# Patient Record
Sex: FEMALE | Race: BLACK OR AFRICAN AMERICAN | NOT HISPANIC OR LATINO | Employment: OTHER | URBAN - METROPOLITAN AREA
[De-identification: names, ages, dates, MRNs, and addresses within clinical notes are randomized per-mention and may not be internally consistent; named-entity substitution may affect disease eponyms.]

---

## 2017-01-05 ENCOUNTER — TELEPHONE (OUTPATIENT)
Dept: INTERNAL MEDICINE | Facility: CLINIC | Age: 67
End: 2017-01-05

## 2017-01-05 ENCOUNTER — TELEPHONE (OUTPATIENT)
Dept: ENDOCRINOLOGY | Facility: CLINIC | Age: 67
End: 2017-01-05

## 2017-01-05 NOTE — TELEPHONE ENCOUNTER
----- Message from Jessi Paulino sent at 1/4/2017  4:13 PM CST -----  Contact: Marjorie/ Mery Castelan / [phone]  pls submit within 6 months prior to dispensing medical necessity and testing frequently     pls fax information to Marjorie @ [phone]    Marjorie can be reached at [phone].

## 2017-01-05 NOTE — TELEPHONE ENCOUNTER
----- Message from Kellie Pavon sent at 1/5/2017  2:04 PM CST -----  Contact: self/[phone]  Pt called in regards to having a  sinus infection/ earache. She did not want the first available urgent care. She wants the dr to call her something into the pharmacy.      Please advise

## 2017-01-05 NOTE — TELEPHONE ENCOUNTER
Called  to the pharmacy and verified what they need and will fax back clinical notes for pt insurance.

## 2017-01-06 ENCOUNTER — TELEPHONE (OUTPATIENT)
Dept: PHARMACY | Facility: CLINIC | Age: 67
End: 2017-01-06

## 2017-01-06 ENCOUNTER — OFFICE VISIT (OUTPATIENT)
Dept: INTERNAL MEDICINE | Facility: CLINIC | Age: 67
End: 2017-01-06
Payer: MEDICARE

## 2017-01-06 VITALS — HEART RATE: 64 BPM | SYSTOLIC BLOOD PRESSURE: 118 MMHG | DIASTOLIC BLOOD PRESSURE: 60 MMHG

## 2017-01-06 DIAGNOSIS — G47.33 OBSTRUCTIVE SLEEP APNEA: Chronic | ICD-10-CM

## 2017-01-06 DIAGNOSIS — E11.42 TYPE 2 DIABETES MELLITUS WITH DIABETIC POLYNEUROPATHY, WITH LONG-TERM CURRENT USE OF INSULIN: Primary | ICD-10-CM

## 2017-01-06 DIAGNOSIS — E78.5 DYSLIPIDEMIA: Chronic | ICD-10-CM

## 2017-01-06 DIAGNOSIS — I63.9 CEREBROVASCULAR ACCIDENT (CVA), UNSPECIFIED MECHANISM: Chronic | ICD-10-CM

## 2017-01-06 DIAGNOSIS — Z79.4 TYPE 2 DIABETES MELLITUS WITH DIABETIC POLYNEUROPATHY, WITH LONG-TERM CURRENT USE OF INSULIN: Primary | ICD-10-CM

## 2017-01-06 PROCEDURE — 99999 PR PBB SHADOW E&M-EST. PATIENT-LVL II: CPT | Mod: PBBFAC,,, | Performed by: FAMILY MEDICINE

## 2017-01-06 PROCEDURE — 99212 OFFICE O/P EST SF 10 MIN: CPT | Mod: PBBFAC | Performed by: FAMILY MEDICINE

## 2017-01-06 PROCEDURE — 99215 OFFICE O/P EST HI 40 MIN: CPT | Mod: S$PBB,,, | Performed by: FAMILY MEDICINE

## 2017-01-06 PROCEDURE — 90732 PPSV23 VACC 2 YRS+ SUBQ/IM: CPT | Mod: PBBFAC | Performed by: FAMILY MEDICINE

## 2017-01-06 RX ORDER — NYSTATIN 100000 [USP'U]/ML
6 SUSPENSION ORAL 4 TIMES DAILY
Qty: 240 ML | Refills: 1 | Status: SHIPPED | OUTPATIENT
Start: 2017-01-06 | End: 2017-01-16

## 2017-01-06 RX ORDER — AZITHROMYCIN 250 MG/1
TABLET, FILM COATED ORAL
Qty: 6 TABLET | Refills: 0 | Status: SHIPPED | OUTPATIENT
Start: 2017-01-06 | End: 2017-01-27

## 2017-01-06 NOTE — PROGRESS NOTES
Subjective:       Patient ID: Annika Mac is a 66 y.o. female.    Chief Complaint: No chief complaint on file.  Annika Mac 66 y.o. female is here for office visit to review care and physical exam, here for f/u and has fa++ilry new c/o cough and sore throat.  Otherwise feels baseline.  Note frustrated with diabetes care, CBgs taken fasting TID are low 200s, using U500 16,11,6 respectivly.      HPI  Review of Systems   Constitutional: Negative for activity change, fatigue, fever and unexpected weight change.   HENT: Positive for sore throat. Negative for congestion, hearing loss, postnasal drip and rhinorrhea.    Eyes: Negative for redness and visual disturbance.   Respiratory: Positive for cough. Negative for chest tightness, shortness of breath and wheezing.    Cardiovascular: Negative for chest pain, palpitations and leg swelling.   Gastrointestinal: Negative for abdominal distention.   Genitourinary: Negative for decreased urine volume, dysuria, flank pain, hematuria, pelvic pain and urgency.   Musculoskeletal: Negative for back pain, gait problem, joint swelling and neck stiffness.   Skin: Negative for color change, rash and wound.   Neurological: Negative for dizziness, syncope, weakness and headaches.   Psychiatric/Behavioral: Negative for behavioral problems, confusion and sleep disturbance. The patient is not nervous/anxious.        Objective:      Physical Exam   Constitutional: She is oriented to person, place, and time. She appears well-developed and well-nourished. No distress.   HENT:   Head: Normocephalic.   Mouth/Throat: No oropharyngeal exudate.   White coating on tounge   Eyes: EOM are normal. Pupils are equal, round, and reactive to light. No scleral icterus.   Neck: Neck supple. No JVD present. No thyromegaly present.   Cardiovascular: Normal rate, regular rhythm and normal heart sounds.  Exam reveals no gallop and no friction rub.    No murmur heard.  Pulmonary/Chest: Effort normal and breath  sounds normal. She has no wheezes. She has no rales.   Abdominal: Soft. Bowel sounds are normal. She exhibits no distension and no mass. There is no tenderness. There is no guarding.   Musculoskeletal: Normal range of motion. She exhibits no edema.   Lymphadenopathy:     She has no cervical adenopathy.   Neurological: She is alert and oriented to person, place, and time. She has normal reflexes. She displays normal reflexes. No cranial nerve deficit. She exhibits normal muscle tone.   Skin: Skin is warm. No rash noted. No erythema.   Psychiatric: She has a normal mood and affect. Thought content normal.       Assessment:       No diagnosis found.    Plan:       Diagnoses and all orders for this visit:    Type 2 diabetes mellitus with diabetic polyneuropathy, with long-term current use of insulin  - discussed titrating U500 to initial goal of fasting CBGs < 150, final goal < 130 asked pharm if can have help )PAP) for transition to Toujeo and Byeta as posible alt to present regiment  Cerebrovascular accident (CVA), unspecified mechanism  - control risk, chart reviewed  Obstructive sleep apnea  - Advisedd to use CPAP  Dyslipidemia  - reviewed

## 2017-01-06 NOTE — MR AVS SNAPSHOT
[facility] - Internal Medicine  [address]  [facility] [address]  Phone: [phone]  Fax: [phone]                  Annika Mac   2017 9:20 AM   Office Visit    Description:  Female : 1950   Provider:  Cody Villatoro MD   Department:  [facility] - Internal Medicine           Diagnoses this Visit        Comments    Type 2 diabetes mellitus with diabetic polyneuropathy, with long-term current use of insulin    -  Primary     Cerebrovascular accident (CVA), unspecified mechanism         Obstructive sleep apnea         Dyslipidemia                To Do List           Future Appointments        Provider Department Dept Phone    2017 10:30 AM Larry Mcguire MD [facility] - Cardiology [phone]    2017 10:00 AM Aniyah Jama DPM [facility] - Podiatry [phone]    2017 1:30 PM Micaela Barrientos MD [facility]-Physical Med & Rehab [phone]    [facility]2017 9:00 [facility] [phone]    3/28/2017 9:00 AM LOUISA Castro [facility] Endocrinology [phone]      Goals (5 Years of Data)              16    HEMOGLOBIN A1C < 7.5   9.1  10.8  8.6      Follow-Up and Disposition     Return in about 4 weeks (around 2/3/2017), or if symptoms worsen or fail to improve.       These Medications        Disp Refills Start End    nystatin (MYCOSTATIN) 100,000 unit/mL suspension 240 mL 1 2017    Take 6 mLs (600,000 Units total) by mouth 4 (four) times daily. - Oral    Pharmacy: [facility] [address] [place] [address] AT [facility] Ph #: [phone]       azithromycin (ZITHROMAX Z-MILAN) 250 MG tablet 6 tablet 0 2017     Use two first day then one a day    Pharmacy: [facility] [address] [place] [address] [address] AT [facility]  [address] Ph #: [phone]         [facility]     [facility] -   "Assistance  Registered nurses in the [facility] provide clinical advisement, health education, appointment booking, and other advisory services.  Call for this free service at [phone].             Medications           Message regarding Medications     Verify the changes and/or additions to your medication regime listed below are the same as discussed with your clinician today.  If any of these changes or additions are incorrect, please notify your healthcare provider.        START taking these NEW medications        Refills    nystatin (MYCOSTATIN) 100,000 unit/mL suspension 1    Sig: Take 6 mLs (600,000 Units total) by mouth 4 (four) times daily.    Class: Normal    Route: Oral    azithromycin (ZITHROMAX Z-MILAN) 250 MG tablet 0    Sig: Use two first day then one a day    Class: Normal           Verify that the below list of medications is an accurate representation of the medications you are currently taking.  If none reported, the list may be blank. If incorrect, please contact your healthcare provider. Carry this list with you in case of emergency.           Current Medications     albuterol 90 mcg/actuation inhaler Inhale 2 puffs into the lungs every 6 (six) hours as needed for Wheezing.    allopurinol (ZYLOPRIM) 100 MG tablet Take 1 tablet by mouth once daily    aspirin 81 MG Chew Take 1 tablet (81 mg total) by mouth once daily.    azelastine (ASTELIN) 137 mcg (0.1 %) nasal spray 1 spray (137 mcg total) by Nasal route 2 (two) times daily.    azithromycin (ZITHROMAX Z-MILAN) 250 MG tablet Use two first day then one a day    BD INSULIN SYRINGE ULTRA-FINE 0.3 mL 30 gauge x 1/2" Syrg USE THREE TIMES DAILY FOR INSULIN INJECTIONS    blood-glucose meter kit Use as instructed, true test/result meter    colchicine 0.6 mg tablet Take 1 tablet (0.6 mg total) by mouth once daily.    duloxetine (CYMBALTA) 30 MG capsule Take 1 capsule (30 mg total) by mouth once daily.    fexofenadine (ALLEGRA) 180 MG tablet " "Take 1 tablet (180 mg total) by mouth once daily.    fluticasone (FLONASE) 50 mcg/actuation nasal spray 2 sprays by Each Nare route once daily.    furosemide (LASIX) 20 MG tablet Take 1 tablet by mouth once a day    hydrocodone-acetaminophen 7.5-325mg (NORCO) 7.5-325 mg per tablet Take 1 tablet by mouth every 6 (six) hours as needed for Pain.    hydrocodone-acetaminophen 7.5-325mg (NORCO) 7.5-325 mg per tablet Starting on Jan 11, 2017. Take 1 tablet by mouth every 6 (six) hours as needed for Pain.    INSULIN REGULAR, HUMAN (INSULIN REGULAR HUM U-500 CONC SUBQ) Inject into the skin. 17 unit trina with meals     irbesartan (AVAPRO) 300 MG tablet Take 1 tablet (300 mg total) by mouth every evening.    labetalol (NORMODYNE) 300 MG tablet Take 1 tablet by mouth two  times daily    lancets 31 gauge Misc 1 lancet by Misc.(Non-Drug; Combo Route) route 4 (four) times daily.    levocetirizine (XYZAL) 5 MG tablet Take 1 tablet (5 mg total) by mouth every evening.    montelukast (SINGULAIR) 10 mg tablet TAKE 1 TABLET(10 MG) BY MOUTH EVERY EVENING    nystatin (MYCOSTATIN) 100,000 unit/mL suspension Take 6 mLs (600,000 Units total) by mouth 4 (four) times daily.    omeprazole (PRILOSEC) 20 MG capsule Take 1 capsule (20 mg total) by mouth 2 (two) times daily.    pen needle, diabetic 32 gauge x 1/5" Ndle 1 application by Misc.(Non-Drug; Combo Route) route 2 (two) times daily.    pen needle, diabetic 32 gauge x 1/6" Ndle 1 application by Misc.(Non-Drug; Combo Route) route 2 (two) times daily.    pregabalin (LYRICA) 100 MG capsule Take 1 capsule (100 mg total) by mouth 2 (two) times daily.    simvastatin (ZOCOR) 40 MG tablet Take 1 tablet by mouth  every evening    spironolactone (ALDACTONE) 25 MG tablet Take 1 tablet by mouth once a day    TRUETEST TEST STRIPS Strp 1 strip by Misc.(Non-Drug; Combo Route) route 4 (four) times daily.           Clinical Reference Information           Vital Signs - Last Recorded  Most recent update: " 1/6/2017  9:40 AM by Cody Villatoro MD    BP Pulse                118/60 64          Blood Pressure          Most Recent Value    BP  118/60      Allergies as of 1/6/2017     Iodinated Contrast Media - Iv Dye      Immunizations Administered on Date of Encounter - 1/6/2017     None

## 2017-01-13 ENCOUNTER — HOSPITAL ENCOUNTER (EMERGENCY)
Facility: HOSPITAL | Age: 67
Discharge: HOME | End: 2017-01-13
Attending: EMERGENCY MEDICINE
Payer: MEDICARE

## 2017-01-13 VITALS
HEART RATE: 77 BPM | WEIGHT: 293 LBS | RESPIRATION RATE: 18 BRPM | SYSTOLIC BLOOD PRESSURE: 131 MMHG | BODY MASS INDEX: 48.82 KG/M2 | HEIGHT: 65 IN | TEMPERATURE: 99 F | OXYGEN SATURATION: 94 % | DIASTOLIC BLOOD PRESSURE: 62 MMHG

## 2017-01-13 DIAGNOSIS — M25.475 SWELLING OF FIRST METATARSOPHALANGEAL (MTP) JOINT OF LEFT FOOT: Primary | ICD-10-CM

## 2017-01-13 DIAGNOSIS — M79.672 FOOT PAIN, LEFT: ICD-10-CM

## 2017-01-13 LAB
ANION GAP SERPL CALC-SCNC: 8 MMOL/L
BASOPHILS # BLD AUTO: 0.02 K/UL
BASOPHILS NFR BLD: 0.2 %
BUN SERPL-MCNC: 31 MG/DL
CALCIUM SERPL-MCNC: 10.5 MG/DL
CHLORIDE SERPL-SCNC: 102 MMOL/L
CO2 SERPL-SCNC: 28 MMOL/L
CREAT SERPL-MCNC: 1.7 MG/DL
DIFFERENTIAL METHOD: ABNORMAL
EOSINOPHIL # BLD AUTO: 0.2 K/UL
EOSINOPHIL NFR BLD: 1.4 %
ERYTHROCYTE [DISTWIDTH] IN BLOOD BY AUTOMATED COUNT: 13.6 %
EST. GFR  (AFRICAN AMERICAN): 35.5 ML/MIN/1.73 M^2
EST. GFR  (NON AFRICAN AMERICAN): 30.8 ML/MIN/1.73 M^2
GLUCOSE SERPL-MCNC: 105 MG/DL
HCT VFR BLD AUTO: 31.3 %
HGB BLD-MCNC: 10.2 G/DL
LYMPHOCYTES # BLD AUTO: 1.3 K/UL
LYMPHOCYTES NFR BLD: 11.5 %
MCH RBC QN AUTO: 28.2 PG
MCHC RBC AUTO-ENTMCNC: 32.6 %
MCV RBC AUTO: 87 FL
MONOCYTES # BLD AUTO: 1.1 K/UL
MONOCYTES NFR BLD: 9.6 %
NEUTROPHILS # BLD AUTO: 8.6 K/UL
NEUTROPHILS NFR BLD: 76.8 %
PLATELET # BLD AUTO: 334 K/UL
PMV BLD AUTO: 10.1 FL
POTASSIUM SERPL-SCNC: 4.3 MMOL/L
RBC # BLD AUTO: 3.62 M/UL
SODIUM SERPL-SCNC: 138 MMOL/L
URATE SERPL-MCNC: 7.1 MG/DL
WBC # BLD AUTO: 11.25 K/UL

## 2017-01-13 PROCEDURE — 99284 EMERGENCY DEPT VISIT MOD MDM: CPT | Mod: ,,, | Performed by: EMERGENCY MEDICINE

## 2017-01-13 PROCEDURE — 25000003 PHARM REV CODE 250: Performed by: PHYSICIAN ASSISTANT

## 2017-01-13 PROCEDURE — 99284 EMERGENCY DEPT VISIT MOD MDM: CPT

## 2017-01-13 PROCEDURE — 80048 BASIC METABOLIC PNL TOTAL CA: CPT

## 2017-01-13 PROCEDURE — 85025 COMPLETE CBC W/AUTO DIFF WBC: CPT

## 2017-01-13 PROCEDURE — 84550 ASSAY OF BLOOD/URIC ACID: CPT

## 2017-01-13 RX ORDER — HYDROCODONE BITARTRATE AND ACETAMINOPHEN 5; 325 MG/1; MG/1
1 TABLET ORAL
Status: COMPLETED | OUTPATIENT
Start: 2017-01-13 | End: 2017-01-13

## 2017-01-13 RX ORDER — TRAMADOL HYDROCHLORIDE 50 MG/1
50 TABLET ORAL EVERY 6 HOURS PRN
Qty: 15 TABLET | Refills: 0 | Status: SHIPPED | OUTPATIENT
Start: 2017-01-13 | End: 2017-01-13

## 2017-01-13 RX ORDER — HYDROCODONE BITARTRATE AND ACETAMINOPHEN 5; 325 MG/1; MG/1
1 TABLET ORAL EVERY 6 HOURS PRN
Qty: 15 TABLET | Refills: 0 | Status: SHIPPED | OUTPATIENT
Start: 2017-01-13 | End: 2017-01-23

## 2017-01-13 RX ORDER — CEPHALEXIN 500 MG/1
500 CAPSULE ORAL
Status: COMPLETED | OUTPATIENT
Start: 2017-01-13 | End: 2017-01-13

## 2017-01-13 RX ORDER — CEPHALEXIN 500 MG/1
500 CAPSULE ORAL EVERY 12 HOURS
Qty: 14 CAPSULE | Refills: 0 | Status: SHIPPED | OUTPATIENT
Start: 2017-01-13 | End: 2017-01-20

## 2017-01-13 RX ADMIN — CEPHALEXIN 500 MG: 500 CAPSULE ORAL at 04:01

## 2017-01-13 RX ADMIN — HYDROCODONE BITARTRATE AND ACETAMINOPHEN 1 TABLET: 5; 325 TABLET ORAL at 02:01

## 2017-01-13 NOTE — ED AVS SNAPSHOT
[facility]  [address]  [facility] [address]               Annika Mac   2017  2:11 PM   ED    Description:  Female : 1950   Department:  [facility]           Your Care was Coordinated By:     Provider Role From To    Vipin Hawkins MD Attending Provider 17 1416 --    PENG Chaparro Physician Assistant 17 141 --      Reason for Visit     Foot Pain           Diagnoses this Visit        Comments    Swelling of first metatarsophalangeal (MTP) joint of left foot    -  Primary     Foot pain, left           ED Disposition     None           To Do List           Follow-up Information     Follow up with Cody Villatoro MD. Call in 1 day.    Specialty:  Family Medicine    Why:  To discuss ER visit and schedule follow up appointment within 1 week    Contact information:    [address]  [facility] [address]  [phone]         These Medications        Disp Refills Start End    hydrocodone-acetaminophen 5-325mg (NORCO) 5-325 mg per tablet 15 tablet 0 2017    Take 1 tablet by mouth every 6 (six) hours as needed for Pain. - Oral    Pharmacy: [facility] [address] [place] [address] [address] [facility]  #: [phone]         [facility]     [facility] -  Assistance  Registered nurses in the [facility] provide clinical advisement, health education, appointment booking, and other advisory services.  Call for this free service at [phone].             Medications           Message regarding Medications     Verify the changes and/or additions to your medication regime listed below are the same as discussed with your clinician today.  If any of these changes or additions are incorrect, please notify your healthcare provider.        START taking these NEW medications        Refills    hydrocodone-acetaminophen  "5-325mg (NORCO) 5-325 mg per tablet 0    Sig: Take 1 tablet by mouth every 6 (six) hours as needed for Pain.    Class: Print    Route: Oral      These medications were administered today        Dose Freq    hydrocodone-acetaminophen 5-325mg per tablet 1 tablet 1 tablet ED 1 Time    Sig: Take 1 tablet by mouth ED 1 Time.    Class: Normal    Route: Oral    Cosign for Ordering: Accepted by Vipin Hawkins MD on 1/13/2017  3:06 PM      STOP taking these medications     hydrocodone-acetaminophen 7.5-325mg (NORCO) 7.5-325 mg per tablet Take 1 tablet by mouth every 6 (six) hours as needed for Pain.           Verify that the below list of medications is an accurate representation of the medications you are currently taking.  If none reported, the list may be blank. If incorrect, please contact your healthcare provider. Carry this list with you in case of emergency.           Current Medications     albuterol 90 mcg/actuation inhaler Inhale 2 puffs into the lungs every 6 (six) hours as needed for Wheezing.    allopurinol (ZYLOPRIM) 100 MG tablet Take 1 tablet by mouth once daily    aspirin 81 MG Chew Take 1 tablet (81 mg total) by mouth once daily.    azelastine (ASTELIN) 137 mcg (0.1 %) nasal spray 1 spray (137 mcg total) by Nasal route 2 (two) times daily.    azithromycin (ZITHROMAX Z-MILAN) 250 MG tablet Use two first day then one a day    BD INSULIN SYRINGE ULTRA-FINE 0.3 mL 30 gauge x 1/2" Syrg USE THREE TIMES DAILY FOR INSULIN INJECTIONS    blood-glucose meter kit Use as instructed, true test/result meter    colchicine 0.6 mg tablet Take 1 tablet (0.6 mg total) by mouth once daily.    duloxetine (CYMBALTA) 30 MG capsule Take 1 capsule (30 mg total) by mouth once daily.    fexofenadine (ALLEGRA) 180 MG tablet Take 1 tablet (180 mg total) by mouth once daily.    fluticasone (FLONASE) 50 mcg/actuation nasal spray 2 sprays by Each Nare route once daily.    furosemide (LASIX) 20 MG tablet Take 1 tablet by mouth once a " "day    hydrocodone-acetaminophen 5-325mg (NORCO) 5-325 mg per tablet Take 1 tablet by mouth every 6 (six) hours as needed for Pain.    INSULIN REGULAR, HUMAN (INSULIN REGULAR HUM U-500 CONC SUBQ) Inject into the skin. 17 unit trina with meals     irbesartan (AVAPRO) 300 MG tablet Take 1 tablet (300 mg total) by mouth every evening.    labetalol (NORMODYNE) 300 MG tablet Take 1 tablet by mouth two  times daily    lancets 31 gauge Misc 1 lancet by Misc.(Non-Drug; Combo Route) route 4 (four) times daily.    levocetirizine (XYZAL) 5 MG tablet Take 1 tablet (5 mg total) by mouth every evening.    montelukast (SINGULAIR) 10 mg tablet TAKE 1 TABLET(10 MG) BY MOUTH EVERY EVENING    nystatin (MYCOSTATIN) 100,000 unit/mL suspension Take 6 mLs (600,000 Units total) by mouth 4 (four) times daily.    omeprazole (PRILOSEC) 20 MG capsule Take 1 capsule (20 mg total) by mouth 2 (two) times daily.    pen needle, diabetic 32 gauge x 1/5" Ndle 1 application by Misc.(Non-Drug; Combo Route) route 2 (two) times daily.    pen needle, diabetic 32 gauge x 1/6" Ndle 1 application by Misc.(Non-Drug; Combo Route) route 2 (two) times daily.    pregabalin (LYRICA) 100 MG capsule Take 1 capsule (100 mg total) by mouth 2 (two) times daily.    simvastatin (ZOCOR) 40 MG tablet Take 1 tablet by mouth  every evening    spironolactone (ALDACTONE) 25 MG tablet Take 1 tablet by mouth once a day    TRUETEST TEST STRIPS Strp 1 strip by Misc.(Non-Drug; Combo Route) route 4 (four) times daily.           Clinical Reference Information           Your Vitals Were     BP Pulse Temp Resp Height Weight    119/58 (BP Location: Right arm, Patient Position: Sitting) 88 98.9 °F (37.2 °C) (Oral) 16 5' 5" (1.651 m) 138.8 kg (306 lb)    SpO2 BMI             95% 50.92 kg/m2         Allergies as of 1/13/2017        Reactions    Iodinated Contrast Media - Iv Dye Rash      Immunizations Administered on Date of Encounter - 1/13/2017     None      ED Micro, Lab, POCT     Start " Ordered       Status Ordering Provider    01/13/17 1437 01/13/17 1436  Uric acid  STAT      Final result     01/13/17 1436 01/13/17 1436  CBC auto differential  STAT      Final result     01/13/17 1436 01/13/17 1436  Basic metabolic panel  STAT      Final result       ED Imaging Orders     Start Ordered       Status Ordering Provider    01/13/17 1436 01/13/17 1436  X-Ray Foot Complete Left  1 time imaging      Final result         Discharge Instructions             Cellulitis  Cellulitis is an infection of the deep layers of skin. A break in the skin, such as a cut or scratch, can let bacteria under the skin. If the bacteria get to deep layers of the skin, it can be serious. If not treated, cellulitis can get into the bloodstream and lymph nodes. The infection can then spread throughout the body. This causes serious illness.  Cellulitis causes the affected skin to become red, swollen, warm, and sore. The reddened areas have a visible border. An open sore may leak fluid (pus). You may have a fever, chills, and pain.  Cellulitis is treated with antibiotics taken for 7 to 10 days. An open sore may be cleaned and covered with cool wet gauze. Symptoms should get better 1 to 2 days after treatment is started. Make sure to take all the antibiotics for the full number of days until they are gone. Keep taking the medicine even if your symptoms go away.  Home care  Follow these tips:  · Limit the use of the part of your body with cellulitis. Movement can cause the infection to spread.  · If the infection is on your leg, walk as little as possible in the first few days of the treatment. Keep your leg raised while sitting. This will help to reduce swelling.  · Take all of the antibiotic medicine exactly as directed until it is gone. Do not miss any doses, especially during the first 7 days. Dont stop taking the medicine when your symptoms get better.  · Keep the affected area clean and dry.  · Wash your hands with soap and  warm water before and after touching your skin. Anyone else who touches your skin should also wash his or her hands. Don't share towels.  Follow-up care  Follow up with your healthcare provider. If your infection does not go away on 1 antibiotic, your healthcare provider will prescribe a different one.  When to seek medical advice  Call your healthcare provider right away if any of these occur:  · Red areas that spread  · Swelling or pain that gets worse  · Fluid leaking from the skin (pus)  · Fever higher of 100.4º F (38.0º C) or higher after 2 days on antibiotics  © 0361-8419 [employer]. [address] [place] [address], [place] [address]. All rights reserved. This information is not intended as a substitute for professional medical care. Always follow your healthcare professional's instructions.          Gout  \  Gout or is an inflammation of a joint due to a build-up of gout crystals in the joint fluid. This occurs when there is an excess of uric acid (a normal waste product) in the body. Uric acid builds up in the body when the kidneys are unable to filter enough of it from the blood. This may occur with age. It is also associated with kidney disease. Gout occurs more often in persons with obesity, diabetes, hypertension, or high levels of fats in the blood. It may be run in families. Gout tends to come and go. A flare up of gout is called an attack. Drinking alcohol or eating certain foods (such as shellfish or foods with additives such as high-fructose corn syrup) may increase uric acid levels in the blood and cause a gout attack.  During a gout attack, the affected joint may become a hot, red, swollen and painful. If you have had one attack of gout, you are likely to have another. An attack of gout can be treated with medicine. If these attacks become frequent, a daily medicine may be prescribed to help the kidneys remove uric acid from the body.  Home care  During a gout attack:  · Rest painful joints.  If gout affects the joints of your foot or leg, you may want to use crutches for the first few days to keep from bearing weight on the affected joint.  · When sitting or lying down, raise the painful joint to a level higher than your heart.  · Apply an ice pack (ice cubes in a plastic bag wrapped in a thin towel) over the injured area for 20 minutes every 1-2 hours the first day for pain relief. Continue this 3-4 times a day for swelling and pain.  · Avoid alcohol and foods listed below (see Preventing attacks) during a gout attack. Drink extra fluid to help flush the uric acid through your kidneys.  · If you were prescribed a medication to treat gout, take it as your healthcare provider has instructed. Don't skip doses.  · Take anti-inflammatory medicine as directed.   · If pain medicines have been prescribed, take them exactly as directed.    Preventing attacks  · Minimize or avoid alcohol use. Excess alcohol intake can cause a gout attack.  · Limit these foods and beverages:  ¨ Organ meats, such as kidneys and liver  ¨ Certain seafoods (anchovies, sardines, shrimp, scallops, herring, mackerel)  ¨ Wild game, meat extracts and meat gravies  ¨ Foods and beverages sweetened with high-fructose corn syrup, such as sodas  · Eat a healthy diet including low-fat and nonfat dairy, whole grains, and vegetables.  · If you are overweight, talk to your healthcare provider about a weight reduction plan. Avoid fasting or extreme low calorie diets (less than 900 calories per day). This will increase uric acid levels in the body.  · If you have diabetes or high blood pressure, work with your doctor to manage these conditions.  · Protect the joint from injury. Trauma can trigger a gout attack.  Follow-up care  Follow up with your healthcare provider or as advised.   When to seek medical advice  Call your healthcare provider if you have any of the following:  · Fever over 100.4°F (38.ºC) with worsening joint pain  · Increasing  redness around the joint  · Pain developing in another joint  · Repeated vomiting, abdominal pain, or blood in the vomit or stool (black or red color)  © 7335-9086 [employer], [address] [place] [address], [place] [address]. All rights reserved. This information is not intended as a substitute for professional medical care. Always follow your healthcare professional's instructions.          Your Scheduled Appointments     Jan 27, 2017 10:30 AM CST   Established Patient Visit with MD Miguel Tolentino buddy - Cardiology ([facility] )    [address]  [place] [address]   [phone]            Feb 16, 2017 10:00 AM CST   Established Patient Visit with LISSY Pollard buddy - Podiatry ([facility] )    [address]  [place] [address]   [phone]            Feb 16, 2017  1:30 PM CST   Established Patient Visit with MD Miguel Coello buddy-Physical Med & Rehab ([facility] )    [address]  [place] [address]   [phone]            Mar 21, 2017  9:00 AM CDT   Non-Fasting Lab with [facility] [place] [facility] [address] [facility] [address] [phone]            Mar 28, 2017  9:00 AM CDT   Established Patient with LOUISA Castro buddy - Endocrinology ([facility] )    [address] [facility] [address] [phone]               [facility] complies with applicable Federal civil rights laws and does not discriminate on the basis of race, color, national origin, age, disability, or sex.        Language Assistance Services     ATTENTION: Language assistance services are available, free of charge. Please call [phone].      ATENCIÓN: Si habla español, tiene a mckinnon disposición servicios gratuitos de asistencia lingüística. Llame al [phone].     CHÚ Ý: N?u b?n nói Ti?ng Vi?t, có các d?ch v? h? tr? ngôn ng? mi?n phí dành cho b?n.  G?i s? [phone].

## 2017-01-13 NOTE — ED PROVIDER NOTES
Encounter Date: 1/13/2017    SCRIBE #1 NOTE: I, Radha Chung, am scribing for, and in the presence of,  Dr. Hawkins. I have scribed the following portions of the note - the APC attestation.       History     Chief Complaint   Patient presents with    Foot Pain     states left foot swollen/red/painful- states x 3 days     Review of patient's allergies indicates:   Allergen Reactions    Iodinated contrast media - iv dye Rash     HPI Comments: 67 y/o AA female with history of HTN, IDDM, CKD, CHF, GERD, CVA, Gout, presents to the ER with chief complaint of left foot pain x 3 days.  She reports increasing redness, swelling, and pain over the last 2 days.  She is having difficulty walking due to foot pain.  She denies recent fall or trauma to the foot.  The pain radiates up to the lower leg. She reports subjective fever, but her temperature was 98 at that time.  She has not taken any pain medications.  Her pain is described as throbbing and is currently rated 9/10.  She denies nausea, vomiting, chest pain, SOB, or additional complaints at this time.      Past Medical History   Diagnosis Date    Allergy     Anemia 7/27/2012    Arthritis     CHF (congestive heart failure)     CKD (chronic kidney disease) stage 3, GFR 30-59 ml/min 7/27/2012    Clotting disorder     Deep vein thrombophlebitis of left leg 7/27/2012    Degenerative disc disease     Diabetic peripheral neuropathy associated with type 2 diabetes mellitus 7/27/2012    GERD (gastroesophageal reflux disease)     HTN (hypertension) 7/27/2012    Hyperlipidemia 7/27/2012    Lead-induced gout of ankle or foot      right going on three weeks    Nonproliferative diabetic retinopathy of right eye 7/27/2012    Obstructive sleep apnea 7/27/2012    Osteoporosis     Proliferative diabetic retinopathy of left eye 7/27/2012    Stroke 8/1/2003    Type 2 diabetes mellitus with diabetic polyneuropathy 7/27/2012    Ulcer        Past Surgical History    Procedure Laterality Date    Total knee arthroplasty  2006     left    Total abdominal hysterectomy  1982    Ganglion cyst excision  2007     Right wrist    Cyst removal  2011     left side of face    Trigger finger release  2009     section      Cataract extraction w/  intraocular lens implant Left 3/2002     left     Cataract extraction w/  intraocular lens implant Right      OD dr. olivares    Pan retinal photocoagulation Bilateral      Dr. Monterroso (Proliferative Diabetic Retinopathy)    Eye surgery Bilateral 2012     eye implants       Social History   Substance Use Topics    Smoking status: Never Smoker    Smokeless tobacco: Never Used    Alcohol use No     Review of Systems   Constitutional: Negative for chills and fever.   HENT: Negative for sore throat.    Respiratory: Negative for shortness of breath.    Cardiovascular: Negative for chest pain.   Gastrointestinal: Negative for nausea.   Genitourinary: Negative for dysuria.   Musculoskeletal: Positive for arthralgias and joint swelling. Negative for back pain, myalgias and neck pain.   Skin: Positive for color change. Negative for rash and wound.   Neurological: Negative for dizziness, syncope, weakness and light-headedness.   Hematological: Does not bruise/bleed easily.       Physical Exam   Initial Vitals   BP Pulse Resp Temp SpO2   17 1204 17 1204 17 1204 17 1204 17 1204   119/58 88 16 98.9 °F (37.2 °C) 95 %     Physical Exam    Constitutional: She appears well-developed and well-nourished.   HENT:   Head: Atraumatic.   Mouth/Throat: Oropharynx is clear and moist.   Eyes: Conjunctivae and EOM are normal. Pupils are equal, round, and reactive to light.   Neck: Normal range of motion. Neck supple.   Cardiovascular: Normal rate, regular rhythm and normal heart sounds.   No murmur heard.  Pulmonary/Chest: Breath sounds normal. No respiratory distress. She has no wheezes. She  has no rhonchi. She has no rales.   Abdominal: Soft. Bowel sounds are normal. There is no tenderness. There is no rebound.   Neurological: She is alert and oriented to person, place, and time.   Skin: No rash noted.   Psychiatric: She has a normal mood and affect.                 ED Course   Procedures  Labs Reviewed   CBC W/ AUTO DIFFERENTIAL - Abnormal; Notable for the following:        Result Value    RBC 3.62 (*)     Hemoglobin 10.2 (*)     Hematocrit 31.3 (*)     Gran # 8.6 (*)     Mono # 1.1 (*)     Gran% 76.8 (*)     Lymph% 11.5 (*)     All other components within normal limits   BASIC METABOLIC PANEL - Abnormal; Notable for the following:     BUN, Bld 31 (*)     Creatinine 1.7 (*)     eGFR if  35.5 (*)     eGFR if non  30.8 (*)     All other components within normal limits   URIC ACID - Abnormal; Notable for the following:     Uric Acid 7.1 (*)     All other components within normal limits             Medical Decision Making:   History:   Old Medical Records: I decided to obtain old medical records.       APC / Resident Notes:   Patient presents for evaluation of left foot redness, pain and swelling ×3 days.  DDX: foot fracture, cellulitis, osteomyelitis, gouty arthritis.      No history of injury.  Swelling appears to be over the great toe MTP.  H/o gout but per pt previous gout was near the ankle. I will order x-ray and labs for further evaluation.  There are no open wounds or ulcerations and no evidence of abscess on exam.      X-ray reveals mild DJD.  No fracture, dislocation or other acute abnormality.  Patient's uric acid is elevated to 7.1. previous labs reveal uric acid from 9-11,   So it is not clear if this is an acute gout flare.  She has no leukocytosis or other significant abnormalities.  BUN/Cr consistent with baseline renal impairment.  We will cover for possible cellulitis with Keflex (500 mg BID renal dosing).  I prescribed Norco as needed for pain.  She is  instructed to elevate the extremity.  They have also advised close follow-up with PCP within 3 days.  She is given strict return precautions.I discussed the care of this patient with my supervising MD, the patient was also seen by him.       Scribe Attestation:   Scribe #1: I performed the above scribed service and the documentation accurately describes the services I performed. I attest to the accuracy of the note.    Attending Attestation:     Physician Attestation Statement for NP/PA:   I have conducted a face to face encounter with this patient in addition to the NP/PA, due to Medical Complexity    Other NP/PA Attestation Additions:      Medical Decision Makin y.o. female with hx of DM, CKD, and gout with left foot pain and swelling. Unclear whether gout attack versus cellulitis. Unlike previous gout location, though at 1st MTP;  No ulcers or skin breakdown, but some erythema does extend anteriorly concerning for cellulitis. Work up done with uric acid below baseline and white count 11. Will treat empirically for cellulitis with keflex (no h/o MRSA or recurrent skin infections) and continue allopurinol for any gout. No sign osteomyelitis, no fever and otherwise at baseline       Physician Attestation for Scribe:  Physician Attestation Statement for Scribe #1: I, Dr. Hawkins, reviewed documentation, as scribed by Radha Chung in my presence, and it is both accurate and complete.                 ED Course     Clinical Impression:   The primary encounter diagnosis was Swelling of first metatarsophalangeal (MTP) joint of left foot. A diagnosis of Foot pain, left was also pertinent to this visit.          PENG Chaparro  17 [id number]       Vipin Hawkins MD  01/15/17 5917

## 2017-01-13 NOTE — ED TRIAGE NOTES
Patient received with complaint of left foot pain, swelling, heat.  States pain and swelling present for two days, now radiating to the posterior calf.  Patient reports no injury.  Does state increased pain with movement.     No LDA's in place on arrival to department.    Family not present.    Pain:  Rated 9/10.    Psychosocial:  Patient is calm and cooperative.  Patients insight and judgement are appropriate to situation.  Appears clean, well maintained, with clothing appropriate to environment.  No evidence of delusions, hallucinations, or psychosis.    Neuro:  Eyes open spontaneously.  Awake, alert, oriented x 4.  Speech clear and appropriate.  Tolerating saliva secretions well.  Able to follow commands, demonstrating ability to actively and appropriately communicate within context of current conversation.  Symmetrical facial muscles.  Moving all extremities well, though pain in left foot causes limp with ambulation.  Adequate muscle tone present.    Movement is purposeful.      Airway:  Bilateral chest rise and fall.  RR regular and non-labored.  No crepitus or subcutaneous emphysema noted on palpation.      Circulatory:  Skin warm, dry, and pink.  Radial pulses strong and regular.  Capillary refill/skin blanching less than 3 seconds to distal of 4 extremities.    Abdomen:  Abdomen obese, soft and non-distended.  Positive normo-active bowel sounds x 4 quadrants.      Urinary:  Patient reports routine urination without pain, frequency, or urgency.  Voids independently.  Reports urine appears dustin/yellow in color.    Extremities:  No deformity.    Skin:  Intact with no bruising/discolorations noted.

## 2017-01-13 NOTE — DISCHARGE INSTRUCTIONS
Cellulitis  Cellulitis is an infection of the deep layers of skin. A break in the skin, such as a cut or scratch, can let bacteria under the skin. If the bacteria get to deep layers of the skin, it can be serious. If not treated, cellulitis can get into the bloodstream and lymph nodes. The infection can then spread throughout the body. This causes serious illness.  Cellulitis causes the affected skin to become red, swollen, warm, and sore. The reddened areas have a visible border. An open sore may leak fluid (pus). You may have a fever, chills, and pain.  Cellulitis is treated with antibiotics taken for 7 to 10 days. An open sore may be cleaned and covered with cool wet gauze. Symptoms should get better 1 to 2 days after treatment is started. Make sure to take all the antibiotics for the full number of days until they are gone. Keep taking the medicine even if your symptoms go away.  Home care  Follow these tips:  · Limit the use of the part of your body with cellulitis. Movement can cause the infection to spread.  · If the infection is on your leg, walk as little as possible in the first few days of the treatment. Keep your leg raised while sitting. This will help to reduce swelling.  · Take all of the antibiotic medicine exactly as directed until it is gone. Do not miss any doses, especially during the first 7 days. Dont stop taking the medicine when your symptoms get better.  · Keep the affected area clean and dry.  · Wash your hands with soap and warm water before and after touching your skin. Anyone else who touches your skin should also wash his or her hands. Don't share towels.  Follow-up care  Follow up with your healthcare provider. If your infection does not go away on 1 antibiotic, your healthcare provider will prescribe a different one.  When to seek medical advice  Call your healthcare provider right away if any of these occur:  · Red areas that spread  · Swelling or pain that gets worse  · Fluid  leaking from the skin (pus)  · Fever higher of 100.4º F (38.0º C) or higher after 2 days on antibiotics  © 0430-0854 [employer]. [address] [place] [address], [place] [address]. All rights reserved. This information is not intended as a substitute for professional medical care. Always follow your healthcare professional's instructions.          Gout  \  Gout or is an inflammation of a joint due to a build-up of gout crystals in the joint fluid. This occurs when there is an excess of uric acid (a normal waste product) in the body. Uric acid builds up in the body when the kidneys are unable to filter enough of it from the blood. This may occur with age. It is also associated with kidney disease. Gout occurs more often in persons with obesity, diabetes, hypertension, or high levels of fats in the blood. It may be run in families. Gout tends to come and go. A flare up of gout is called an attack. Drinking alcohol or eating certain foods (such as shellfish or foods with additives such as high-fructose corn syrup) may increase uric acid levels in the blood and cause a gout attack.  During a gout attack, the affected joint may become a hot, red, swollen and painful. If you have had one attack of gout, you are likely to have another. An attack of gout can be treated with medicine. If these attacks become frequent, a daily medicine may be prescribed to help the kidneys remove uric acid from the body.  Home care  During a gout attack:  · Rest painful joints. If gout affects the joints of your foot or leg, you may want to use crutches for the first few days to keep from bearing weight on the affected joint.  · When sitting or lying down, raise the painful joint to a level higher than your heart.  · Apply an ice pack (ice cubes in a plastic bag wrapped in a thin towel) over the injured area for 20 minutes every 1-2 hours the first day for pain relief. Continue this 3-4 times a day for swelling and pain.  · Avoid  alcohol and foods listed below (see Preventing attacks) during a gout attack. Drink extra fluid to help flush the uric acid through your kidneys.  · If you were prescribed a medication to treat gout, take it as your healthcare provider has instructed. Don't skip doses.  · Take anti-inflammatory medicine as directed.   · If pain medicines have been prescribed, take them exactly as directed.    Preventing attacks  · Minimize or avoid alcohol use. Excess alcohol intake can cause a gout attack.  · Limit these foods and beverages:  ¨ Organ meats, such as kidneys and liver  ¨ Certain seafoods (anchovies, sardines, shrimp, scallops, herring, mackerel)  ¨ Wild game, meat extracts and meat gravies  ¨ Foods and beverages sweetened with high-fructose corn syrup, such as sodas  · Eat a healthy diet including low-fat and nonfat dairy, whole grains, and vegetables.  · If you are overweight, talk to your healthcare provider about a weight reduction plan. Avoid fasting or extreme low calorie diets (less than 900 calories per day). This will increase uric acid levels in the body.  · If you have diabetes or high blood pressure, work with your doctor to manage these conditions.  · Protect the joint from injury. Trauma can trigger a gout attack.  Follow-up care  Follow up with your healthcare provider or as advised.   When to seek medical advice  Call your healthcare provider if you have any of the following:  · Fever over 100.4°F (38.ºC) with worsening joint pain  · Increasing redness around the joint  · Pain developing in another joint  · Repeated vomiting, abdominal pain, or blood in the vomit or stool (black or red color)  © 8920-8855 [employer]. [address] [place] [address], [place] [address]. All rights reserved. This information is not intended as a substitute for professional medical care. Always follow your healthcare professional's instructions.

## 2017-01-24 ENCOUNTER — TELEPHONE (OUTPATIENT)
Dept: INTERNAL MEDICINE | Facility: CLINIC | Age: 67
End: 2017-01-24

## 2017-01-26 ENCOUNTER — HOSPITAL ENCOUNTER (EMERGENCY)
Facility: HOSPITAL | Age: 67
Discharge: HOME | End: 2017-01-26
Attending: EMERGENCY MEDICINE
Payer: MEDICARE

## 2017-01-26 VITALS
DIASTOLIC BLOOD PRESSURE: 77 MMHG | BODY MASS INDEX: 48.82 KG/M2 | TEMPERATURE: 98 F | HEIGHT: 65 IN | SYSTOLIC BLOOD PRESSURE: 145 MMHG | OXYGEN SATURATION: 98 % | WEIGHT: 293 LBS | HEART RATE: 65 BPM | RESPIRATION RATE: 20 BRPM

## 2017-01-26 DIAGNOSIS — R05.9 COUGH: ICD-10-CM

## 2017-01-26 DIAGNOSIS — M25.569 KNEE PAIN: ICD-10-CM

## 2017-01-26 LAB
ALBUMIN SERPL BCP-MCNC: 3.4 G/DL
ALP SERPL-CCNC: 161 U/L
ALT SERPL W/O P-5'-P-CCNC: 12 U/L
ANION GAP SERPL CALC-SCNC: 6 MMOL/L
AST SERPL-CCNC: 21 U/L
BASOPHILS # BLD AUTO: 0.01 K/UL
BASOPHILS NFR BLD: 0.2 %
BILIRUB SERPL-MCNC: 0.3 MG/DL
BUN SERPL-MCNC: 25 MG/DL
CALCIUM SERPL-MCNC: 10.1 MG/DL
CHLORIDE SERPL-SCNC: 108 MMOL/L
CO2 SERPL-SCNC: 25 MMOL/L
CREAT SERPL-MCNC: 1.6 MG/DL
DIFFERENTIAL METHOD: ABNORMAL
EOSINOPHIL # BLD AUTO: 0.3 K/UL
EOSINOPHIL NFR BLD: 6 %
ERYTHROCYTE [DISTWIDTH] IN BLOOD BY AUTOMATED COUNT: 14.1 %
EST. GFR  (AFRICAN AMERICAN): 38.2 ML/MIN/1.73 M^2
EST. GFR  (NON AFRICAN AMERICAN): 33.1 ML/MIN/1.73 M^2
GLUCOSE SERPL-MCNC: 102 MG/DL
HCT VFR BLD AUTO: 29.7 %
HGB BLD-MCNC: 9.9 G/DL
LYMPHOCYTES # BLD AUTO: 1.5 K/UL
LYMPHOCYTES NFR BLD: 25.9 %
MCH RBC QN AUTO: 27.8 PG
MCHC RBC AUTO-ENTMCNC: 33.3 %
MCV RBC AUTO: 83 FL
MONOCYTES # BLD AUTO: 0.4 K/UL
MONOCYTES NFR BLD: 7.2 %
NEUTROPHILS # BLD AUTO: 3.4 K/UL
NEUTROPHILS NFR BLD: 60.5 %
PLATELET # BLD AUTO: 334 K/UL
PMV BLD AUTO: 10.3 FL
POTASSIUM SERPL-SCNC: 4.7 MMOL/L
PROT SERPL-MCNC: 7.3 G/DL
RBC # BLD AUTO: 3.56 M/UL
SODIUM SERPL-SCNC: 139 MMOL/L
WBC # BLD AUTO: 5.68 K/UL

## 2017-01-26 PROCEDURE — 80053 COMPREHEN METABOLIC PANEL: CPT

## 2017-01-26 PROCEDURE — 99285 EMERGENCY DEPT VISIT HI MDM: CPT | Mod: GC,,, | Performed by: EMERGENCY MEDICINE

## 2017-01-26 PROCEDURE — 99284 EMERGENCY DEPT VISIT MOD MDM: CPT | Mod: 25

## 2017-01-26 PROCEDURE — 93010 ELECTROCARDIOGRAM REPORT: CPT | Mod: ,,, | Performed by: INTERNAL MEDICINE

## 2017-01-26 PROCEDURE — 85025 COMPLETE CBC W/AUTO DIFF WBC: CPT

## 2017-01-26 PROCEDURE — 93005 ELECTROCARDIOGRAM TRACING: CPT

## 2017-01-26 NOTE — ED PROVIDER NOTES
"Encounter Date: 1/26/2017    SCRIBE #1 NOTE: I, James Corbin, am scribing for, and in the presence of,  Dr. Benavides. I have scribed the following portions of the note - the Resident attestation.       History     Chief Complaint   Patient presents with    Loss of Consciousness     Pt states "I keep coughing and passing out. I came here in September for the same thing."      Review of patient's allergies indicates:   Allergen Reactions    Iodinated contrast media - iv dye Rash     HPI   66 yro female with PMH GERD, sleep apnea (on CPAP), morbid obesity, CVA, HTN, DM2, CHF with diastolic dysfunction, CKD, and gout presents with syncope. She states this morning she was coughing forcefully and lost consciousness while coughing. Her  witnessed the event and states she "passed out" only for a few seconds. He caught her and tapped her face, and she regained consciousness, but was lethargic. He denies she had any tonic clonic movements or incontinence. She has had a cough since about July, and began having episodes of syncope with the cough in September. She reports about 7 episodes of syncope with coughing since September. She never has syncope without coughing. Every episode only lasts a few seconds. She reports she fell last Tuesday due to one of these episodes and injured her right knee, which is still painful to palpation and difficult to walk on. The cough is associated with mild SOB. It wakes her from sleep and is worse when she lies flat. She also endorses dizziness and light headedness when standing. She denies fever, chills, nausea, vomiting, or hypoglycemic events. She denies CP or palpitations. She was evaluated by pulmonology in December and her cough was thought to be possibly cryptogenic secondary to GERD and weight loss was recommended.     Past Medical History   Diagnosis Date    Allergy     Anemia 7/27/2012    Arthritis     CHF (congestive heart failure)     CKD (chronic kidney disease) " stage 3, GFR 30-59 ml/min 2012    Clotting disorder     Deep vein thrombophlebitis of left leg 2012    Degenerative disc disease     Diabetic peripheral neuropathy associated with type 2 diabetes mellitus 2012    GERD (gastroesophageal reflux disease)     HTN (hypertension) 2012    Hyperlipidemia 2012    Lead-induced gout of ankle or foot      right going on three weeks    Nonproliferative diabetic retinopathy of right eye 2012    Obstructive sleep apnea 2012    Osteoporosis     Proliferative diabetic retinopathy of left eye 2012    Stroke 2003    Type 2 diabetes mellitus with diabetic polyneuropathy 2012    Ulcer      Past Medical History Pertinent Negatives   Diagnosis Date Noted    *Atrial fibrillation 2012    Amblyopia 2014    Anxiety 2012    Asthma 2012    Blood transfusion 2012    Cancer 2012    COPD (chronic obstructive pulmonary disease) 2012    Coronary artery disease 2012    Deep vein thrombosis 2012    DEMENTIA 2012    Depression 2012    Diabetes mellitus type I 2012    Emphysema of lung 2012    Glaucoma 3/25/2015    Heart murmur 2012    HIV infection 2012    Macular degeneration 2014    Meningitis 2012    Myocardial infarction 2012    Pulmonary embolism 2012    Retinal detachment 2014    Seizures 2012    Sickle cell anemia 2012    Sickle cell trait 3/25/2015    Strabismus 2014    Substance abuse 2012    Thyroid disease 2012    Tuberculosis 2012    Uveitis 2014     Past Surgical History   Procedure Laterality Date    Total knee arthroplasty  2006     left    Total abdominal hysterectomy  1982    Ganglion cyst excision  2007     Right wrist    Cyst removal  2011     left side of face    Trigger finger release  2009      section      Cataract extraction w/  intraocular lens implant Left 3/2002     left     Cataract extraction w/  intraocular lens implant Right 12/12     OD dr. olivares    Pan retinal photocoagulation Bilateral      Dr. Monterroso (Proliferative Diabetic Retinopathy)    Eye surgery Bilateral 2012     eye implants     Family History   Problem Relation Age of Onset    Diabetes Mother     Hypertension Mother     Heart disease Father     Diabetes Sister     Thyroid disease Brother     Amblyopia Neg Hx     Blindness Neg Hx     Glaucoma Neg Hx     Macular degeneration Neg Hx     Retinal detachment Neg Hx     Strabismus Neg Hx      Social History   Substance Use Topics    Smoking status: Never Smoker    Smokeless tobacco: Never Used    Alcohol use No     Review of Systems   Constitutional: Positive for activity change. Negative for chills and fever.   HENT: Positive for congestion and postnasal drip.    Eyes: Negative for discharge and visual disturbance.   Respiratory: Positive for cough. Negative for chest tightness.    Cardiovascular: Negative for chest pain, palpitations and leg swelling.   Gastrointestinal: Negative for abdominal pain, constipation, diarrhea, nausea and vomiting.   Endocrine: Negative for cold intolerance and heat intolerance.   Genitourinary: Negative for dysuria and frequency.   Musculoskeletal: Positive for arthralgias. Negative for myalgias.   Skin: Negative for rash and wound.   Neurological: Positive for dizziness, syncope, light-headedness and headaches. Negative for seizures, facial asymmetry, speech difficulty, weakness and numbness.   Psychiatric/Behavioral: Negative for agitation and behavioral problems.       Physical Exam   Initial Vitals   BP Pulse Resp Temp SpO2   01/26/17 1035 01/26/17 1035 01/26/17 1035 01/26/17 1035 01/26/17 1035   126/59 63 16 98 °F (36.7 °C) 95 %     Physical Exam    Constitutional: She appears well-developed and well-nourished. No distress.    HENT:   Head: Normocephalic and atraumatic.   Nose: Nose normal.   Mouth/Throat: Oropharynx is clear and moist. No oropharyngeal exudate.   Eyes: Conjunctivae and EOM are normal. Pupils are equal, round, and reactive to light. No scleral icterus.   Neck: Neck supple. No thyromegaly present.   Cardiovascular: Normal rate, regular rhythm, normal heart sounds and intact distal pulses.   No murmur heard.  Pulmonary/Chest: Breath sounds normal. No respiratory distress. She has no wheezes. She has no rales.   Abdominal: Soft. Bowel sounds are normal. She exhibits no distension. There is no tenderness.   Musculoskeletal: She exhibits tenderness. She exhibits no edema.   Left lower extremity and dorsum of left  to palpation, right knee diffusely tender to palpation especially over medial aspect   Neurological: She is alert and oriented to person, place, and time. She has normal strength. No cranial nerve deficit or sensory deficit.   Skin: Skin is warm and dry.   Psychiatric: She has a normal mood and affect. Thought content normal.         ED Course   Procedures  Labs Reviewed   CBC W/ AUTO DIFFERENTIAL   COMPREHENSIVE METABOLIC PANEL     EKG Readings: (Independently Interpreted)   Initial Reading: No STEMI. Rhythm: Normal Sinus Rhythm.          Medical Decision Making:   Initial Assessment:   66 yro female with PMH GERD, sleep apnea, morbid obesity, CHF, DM2, HTN, CKD, and gout presents with syncope associated with cough  Differential Diagnosis:   Cough syncope due to vasovagal activation, orthostatic, ACS, arrhythmia, stroke  ED Management:  EKG shows no ischemic changes, no arrhythmia  CXR and CR right knee ordered  CBC, CMP  Orthostatic vitals  Will follow up imaging and lab results        APC / Resident Notes:   1:53 PM  XR right knee- No evidence for acute fracture dislocation right knee. Continued tricompartmental degenerative change similar to the prior. No focal bone erosion or evidence for  significant joint effusion.    CXR- Study limited by body habitus. There is no lung consolidation. No definite pleural effusion or pneumothorax. Cardiac mediastinal silhouette stable. Visualized osseous structures grossly intact.         Scribe Attestation:   Scribe #1: I performed the above scribed service and the documentation accurately describes the services I performed. I attest to the accuracy of the note.    Attending Attestation:   Physician Attestation Statement for Resident:  As the supervising MD   Physician Attestation Statement: I have personally seen and examined this patient.   I agree with the above history. -: Patient presents with episodes of syncope after coughing. She has had this for the past 6 months. She has seen pulmonology who feels that her coughing is related to excess weight and may be GERD. She has had multiple episodes of syncope without diagnosis of arrhythmia.    As the supervising MD I agree with the above PE.   -: Comfortable. No acute distress. Regular rate and rhythm. Neurologically intact.   As the supervising MD I agree with the above treatment, course, plan, and disposition.   -: Syncopal episode related to cough. Highly doubt cardiac arrhythmia. She had normal recent ECHO. Will run basic labs.     Labs were normal, EKG showed nsr without ischemic changes, and CXR was clear. Will discharge home.           Physician Attestation for Scribe:  Physician Attestation Statement for Scribe #1: I, Dr. Benavides, reviewed documentation, as scribed by James Corbin in my presence, and it is both accurate and complete.                 ED Course     Clinical Impression:   Diagnoses of Cough and Knee pain were pertinent to this visit.          Christiana Bales MD  Resident  01/27/17 1315

## 2017-01-26 NOTE — PROVIDER PROGRESS NOTES - EMERGENCY DEPT.
Encounter Date: 1/26/2017    ED Physician Progress Notes       SCRIBE NOTE: I, James Corbin, am scribing for, and in the presence of,  Dr. Benavides.  Physician Statement: I, Dr. Benavides, personally performed the services described in this documentation as scribed by James Corbin in my presence, and it is both accurate and complete.      EKG - STEMI Decision  Initial Reading: No STEMI present.

## 2017-01-26 NOTE — ED NOTES
ED tech reports syncope after EKG. Pt reports patient lost consciousness for 10 seconds after coughing.

## 2017-01-26 NOTE — ED TRIAGE NOTES
Cough since September progressively getting worse, frequent falls and syncopal episodes. ER tech witnessed a syncopal episode while obtaining EKG today.

## 2017-01-26 NOTE — ED NOTES
Iv removed, pt discharged per wc into care of spouse, no acute distress noted, resp even and unlabored, pt verbalized understanding of discharge instructions provided

## 2017-01-26 NOTE — ED NOTES
Patient identifiers for Annika Mac checked and correct.    LOC: Patient is awake, alert, and aware of environment with an appropriate affect. Patient is oriented x 3 and speaking appropriately.  APPEARANCE: Patient resting comfortably and in no acute distress. Patient is clean and well groomed, patient's clothing is properly fastened.  HEENT: c/o frontal HA, c/o dizziness with standing  SKIN: The skin is warm and dry. Patient has normal skin turgor and moist mucus membranes. Skin is intact; no bruising or breakdown noted.  MUSKULOSKELETAL: Patient is moving all extremities=generalized weakness noted, c/o right knee and buttocks pain from recent fall,  no obvious deformities noted. Pulses intact.   RESPIRATORY: Airway is open and patent. Respirations are spontaneous and non-labored with normal effort and rate, BBS=clear, c/o burning chest pain and productive cough with white sputum.  CARDIAC: Patient has a normal rate and rhythm. SR on cardiac monitor, +2 pitting edema to bilateral lower extremities noted.   ABDOMEN: obese abd noted, Soft and non-tender upon palpation.  NEUROLOGICAL: Facial expression is symmetrical. Hand grasps are equal bilaterally. Normal sensation in all extremities when touched with finger.    Allergies reported:   Review of patient's allergies indicates:   Allergen Reactions    Iodinated contrast media - iv dye Rash

## 2017-01-26 NOTE — ED AVS SNAPSHOT
[facility]  [address] [facility] [address]               Annika Mac   2017 10:52 AM   ED    Description:  Female : 1950   Department:  [facility]           Your Care was Coordinated By:     Provider Role From To    Serjio Benavides MD Attending Provider 17 1055 --    Christiana Bales MD Resident 17 1101 --      Reason for Visit     Loss of Consciousness           Diagnoses this Visit        Comments    Cough         Knee pain           ED Disposition     None           To Do List           Follow-up Information     Follow up with EMERGENCY - [facility].    Why:  As needed, If symptoms worsen    Contact information:    [address] [facility] [address]          Follow up with Cody Villatoro MD In 2 weeks.    Specialty:  Family Medicine    Why:  ED follow-up for syncope due to cough, right knee pain    Contact information:    [address]  [place] [address]  [phone]        [facility]     [facility] Nurse Care [facility] - 24/7 Assistance  Registered nurses in the [facility] provide clinical advisement, health education, appointment booking, and other advisory services.  Call for this free service at [phone].             Medications           Message regarding Medications     Verify the changes and/or additions to your medication regime listed below are the same as discussed with your clinician today.  If any of these changes or additions are incorrect, please notify your healthcare provider.             Verify that the below list of medications is an accurate representation of the medications you are currently taking.  If none reported, the list may be blank. If incorrect, please contact your healthcare provider. Carry this list with you in case of emergency.           Current Medications     albuterol 90 mcg/actuation inhaler Inhale 2 puffs into the  "lungs every 6 (six) hours as needed for Wheezing.    allopurinol (ZYLOPRIM) 100 MG tablet Take 1 tablet by mouth once daily    aspirin 81 MG Chew Take 1 tablet (81 mg total) by mouth once daily.    azelastine (ASTELIN) 137 mcg (0.1 %) nasal spray 1 spray (137 mcg total) by Nasal route 2 (two) times daily.    BD INSULIN SYRINGE ULTRA-FINE 0.3 mL 30 gauge x 1/2" Syrg USE THREE TIMES DAILY FOR INSULIN INJECTIONS    blood-glucose meter kit Use as instructed, true test/result meter    duloxetine (CYMBALTA) 30 MG capsule Take 1 capsule (30 mg total) by mouth once daily.    fexofenadine (ALLEGRA) 180 MG tablet Take 1 tablet (180 mg total) by mouth once daily.    fluticasone (FLONASE) 50 mcg/actuation nasal spray 2 sprays by Each Nare route once daily.    furosemide (LASIX) 20 MG tablet Take 1 tablet by mouth once a day    INSULIN REGULAR, HUMAN (INSULIN REGULAR HUM U-500 CONC SUBQ) Inject into the skin. 17 unit rtina with meals     irbesartan (AVAPRO) 300 MG tablet Take 1 tablet (300 mg total) by mouth every evening.    labetalol (NORMODYNE) 300 MG tablet Take 1 tablet by mouth two  times daily    levocetirizine (XYZAL) 5 MG tablet Take 1 tablet (5 mg total) by mouth every evening.    pregabalin (LYRICA) 100 MG capsule Take 1 capsule (100 mg total) by mouth 2 (two) times daily.    simvastatin (ZOCOR) 40 MG tablet Take 1 tablet by mouth  every evening    spironolactone (ALDACTONE) 25 MG tablet Take 1 tablet by mouth once a day    azithromycin (ZITHROMAX Z-MILAN) 250 MG tablet Use two first day then one a day    colchicine 0.6 mg tablet Take 1 tablet (0.6 mg total) by mouth once daily.    lancets 31 gauge Misc 1 lancet by Misc.(Non-Drug; Combo Route) route 4 (four) times daily.    montelukast (SINGULAIR) 10 mg tablet TAKE 1 TABLET(10 MG) BY MOUTH EVERY EVENING    omeprazole (PRILOSEC) 20 MG capsule Take 1 capsule (20 mg total) by mouth 2 (two) times daily.    pen needle, diabetic 32 gauge x 1/5" Ndle 1 application by " "Misc.(Non-Drug; Combo Route) route 2 (two) times daily.    pen needle, diabetic 32 gauge x 1/6" Ndle 1 application by Misc.(Non-Drug; Combo Route) route 2 (two) times daily.    TRUETEST TEST STRIPS Strp 1 strip by Misc.(Non-Drug; Combo Route) route 4 (four) times daily.           Clinical Reference Information           Your Vitals Were     BP Pulse Temp Resp Height Weight    149/68 (BP Location: Right arm, Patient Position: Standing) 63 98 °F (36.7 °C) (Oral) 18 5' 5" (1.651 m) 135.6 kg (299 lb)    SpO2 BMI             100% 49.76 kg/m2         Allergies as of 1/26/2017        Reactions    Iodinated Contrast Media - Iv Dye Rash      Immunizations Administered on Date of Encounter - 1/26/2017     None      ED Micro, Lab, POCT     Start Ordered       Status Ordering Provider    01/26/17 1141 01/26/17 1142  CBC auto differential  STAT      Final result     01/26/17 1141 01/26/17 1142  Comprehensive metabolic panel  STAT      Final result       ED Imaging Orders     Start Ordered       Status Ordering Provider    01/26/17 1142 01/26/17 1142  X-Ray Knee 1 or 2 View Right  1 time imaging      Final result     01/26/17 1141 01/26/17 1142  X-Ray Chest PA And Lateral  1 time imaging      Final result         Discharge Instructions         Tips to Control Acid Reflux  To control acid reflux, youll need to make some basic diet and lifestyle changes. The simple steps outlined below may be all youll need to ease discomfort.  Watch what you eat    · Avoid fatty foods and spicy foods.  · Eat fewer acidic foods, such as citrus and tomato-based foods. These can increase symptoms.  · Limit drinking alcohol, caffeine, and fizzy beverages. All increase acid reflux.  · Try limiting chocolate, peppermint, and spearmint. These can worsen acid reflux in some people.  Watch when you eat  · Avoid lying down for 3 hours after eating.  · Do not snack before going to bed.  Raise your head    Raising your head and upper body by 4 to 6 " inches helps limit reflux when youre lying down. Put blocks under the head of your bed frame to raise it.  Other changes  · Lose weight, if you need to  · Dont exercise near bedtime  · Avoid tight-fitting clothes  · Limit aspirin and ibuprofen  · Stop smoking   © 8323-1692 [employer] [address] [place] [address]. All rights reserved. This information is not intended as a substitute for professional medical care. Always follow your healthcare professional's instructions.          Your Scheduled Appointments     Jan 27, 2017 10:30 AM CST   Established Patient Visit with MD Miguel Tolentino buddy - Cardiology ([facility] )    [address]  [place] [address]   [phone]            Feb 16, 2017 10:00 AM CST   Established Patient Visit with LISSY Pollard buddy - Podiatry ([facility] )    [address]  [place] [address]   [phone]            Feb 16, 2017  1:30 PM CST   Established Patient Visit with MD Miguel Coello buddy-Physical Med & Rehab ([facility] )    [address]  [place] [address]   [phone]            Mar 21, 2017  9:00 AM CDT   [place]-F[place] [facility] [facility] [place] [facility] [place]    [address] [facility] [id number] [phone]            Mar 28, 2017  9:00 AM CDT   Established Patient with LOUISA Castro - Endocrinology ([facility] )    [facility] [facility] [address]   [phone]               [facility] complies with applicable Federal civil rights laws and does not discriminate on the basis of race, color, national origin, age, disability, or sex.        Language Assistance Services     ATTENTION: Language assistance services are available, free of charge. Please call [phone].      ATENCIÓN: Si habla español, tiene a mckinnon disposición servicios gratuitos de asistencia lingüística.  Anson sim [phone].     JONAS Ý: N?u b?n nói Ti?ng Vi?t, có các d?ch v? h? tr? divyaôn ng? mi?n phí dành cho b?n. G?i s? [phone].

## 2017-01-27 ENCOUNTER — OFFICE VISIT (OUTPATIENT)
Dept: CARDIOLOGY | Facility: CLINIC | Age: 67
End: 2017-01-27
Payer: MEDICARE

## 2017-01-27 VITALS
BODY MASS INDEX: 48.82 KG/M2 | WEIGHT: 293 LBS | HEIGHT: 65 IN | HEART RATE: 65 BPM | DIASTOLIC BLOOD PRESSURE: 58 MMHG | SYSTOLIC BLOOD PRESSURE: 123 MMHG

## 2017-01-27 DIAGNOSIS — E78.5 DYSLIPIDEMIA: Chronic | ICD-10-CM

## 2017-01-27 DIAGNOSIS — K21.9 GASTROESOPHAGEAL REFLUX DISEASE, ESOPHAGITIS PRESENCE NOT SPECIFIED: ICD-10-CM

## 2017-01-27 DIAGNOSIS — R05.4 COUGH SYNCOPE: ICD-10-CM

## 2017-01-27 DIAGNOSIS — I10 ESSENTIAL HYPERTENSION: Primary | ICD-10-CM

## 2017-01-27 DIAGNOSIS — E66.01 MORBID OBESITY DUE TO EXCESS CALORIES: Chronic | ICD-10-CM

## 2017-01-27 DIAGNOSIS — R55 COUGH SYNCOPE: ICD-10-CM

## 2017-01-27 DIAGNOSIS — I51.89 DIASTOLIC DYSFUNCTION: Chronic | ICD-10-CM

## 2017-01-27 PROCEDURE — 99999 PR PBB SHADOW E&M-EST. PATIENT-LVL III: CPT | Mod: PBBFAC,,, | Performed by: INTERNAL MEDICINE

## 2017-01-27 PROCEDURE — 99214 OFFICE O/P EST MOD 30 MIN: CPT | Mod: S$PBB,,, | Performed by: INTERNAL MEDICINE

## 2017-01-27 PROCEDURE — 99213 OFFICE O/P EST LOW 20 MIN: CPT | Mod: PBBFAC | Performed by: INTERNAL MEDICINE

## 2017-01-27 RX ORDER — PANTOPRAZOLE SODIUM 40 MG/1
40 TABLET, DELAYED RELEASE ORAL DAILY
Qty: 30 TABLET | Refills: 6 | Status: SHIPPED | OUTPATIENT
Start: 2017-01-27 | End: 2017-05-12 | Stop reason: SDUPTHER

## 2017-01-27 NOTE — MR AVS SNAPSHOT
[facility]  [address]  [facility] [address]  Phone: [phone]                  Annika Mac   2017 10:30 AM   Office Visit    Description:  Female : 1950   Provider:  Larry Mcguire MD   Department:  [facility]           Reason for Visit     Loss of Consciousness     Hypertension     Fatigue           Diagnoses this Visit        Comments    Essential hypertension    -  Primary     Diastolic dysfunction         Cough syncope         Dyslipidemia         Morbid obesity due to excess calories         Gastroesophageal reflux disease, esophagitis presence not specified                To Do List           Future Appointments        Provider Department Dept Phone    2017 10:00 AM LISSY Pollard buddy - Podiatry [phone]    2017 1:30 PM MD Miguel Coello buddy-Physical Med & Rehab [phone]    3/21/2017 9:00 AM [facility] [phone]    3/28/2017 9:00 AM LOUISA Castro buddy - Endocrinology [phone]    3/28/2017 11:00 AM DIABETES EDUCATOR, INT MED 1 Miguel buddy - IM Diabetes Program [phone]      Goals (5 Years of Data)              16    HEMOGLOBIN A1C < 7.5   9.1  10.8  8.6      Follow-Up and Disposition     Return in about 6 months (around 2017).       These Medications        Disp Refills Start End    pantoprazole (PROTONIX) 40 MG tablet 30 tablet 6 2017    Take 1 tablet (40 mg total) by mouth once daily. - Oral    Pharmacy: [facility] [id number] [place] [address] [facility] [address] [facility] [address] Porfirio Taylor [facility] Ph #: [phone]         [facility] On Call     [facility] On Call Nurse Care Line -  Assistance  Registered nurses in the [facility] On Call Center provide clinical advisement, health education, appointment booking, and other advisory services.  Call for this free service at [phone].            "  Medications           Message regarding Medications     Verify the changes and/or additions to your medication regime listed below are the same as discussed with your clinician today.  If any of these changes or additions are incorrect, please notify your healthcare provider.        START taking these NEW medications        Refills    pantoprazole (PROTONIX) 40 MG tablet 6    Sig: Take 1 tablet (40 mg total) by mouth once daily.    Class: Normal    Route: Oral      STOP taking these medications     azithromycin (ZITHROMAX Z-IMLAN) 250 MG tablet Use two first day then one a day    omeprazole (PRILOSEC) 20 MG capsule Take 1 capsule (20 mg total) by mouth 2 (two) times daily.           Verify that the below list of medications is an accurate representation of the medications you are currently taking.  If none reported, the list may be blank. If incorrect, please contact your healthcare provider. Carry this list with you in case of emergency.           Current Medications     albuterol 90 mcg/actuation inhaler Inhale 2 puffs into the lungs every 6 (six) hours as needed for Wheezing.    allopurinol (ZYLOPRIM) 100 MG tablet Take 1 tablet by mouth once daily    aspirin 81 MG Chew Take 1 tablet (81 mg total) by mouth once daily.    azelastine (ASTELIN) 137 mcg (0.1 %) nasal spray 1 spray (137 mcg total) by Nasal route 2 (two) times daily.    BD INSULIN SYRINGE ULTRA-FINE 0.3 mL 30 gauge x 1/2" Syrg USE THREE TIMES DAILY FOR INSULIN INJECTIONS    blood-glucose meter kit Use as instructed, true test/result meter    colchicine 0.6 mg tablet Take 1 tablet (0.6 mg total) by mouth once daily.    duloxetine (CYMBALTA) 30 MG capsule Take 1 capsule (30 mg total) by mouth once daily.    fexofenadine (ALLEGRA) 180 MG tablet Take 1 tablet (180 mg total) by mouth once daily.    fluticasone (FLONASE) 50 mcg/actuation nasal spray 2 sprays by Each Nare route once daily.    furosemide (LASIX) 20 MG tablet Take 1 tablet by mouth once a day " "   INSULIN REGULAR, HUMAN (INSULIN REGULAR HUM U-500 CONC SUBQ) Inject into the skin. 17 unit trina with meals     irbesartan (AVAPRO) 300 MG tablet Take 1 tablet (300 mg total) by mouth every evening.    labetalol (NORMODYNE) 300 MG tablet Take 1 tablet by mouth two  times daily    lancets 31 gauge Misc 1 lancet by Misc.(Non-Drug; Combo Route) route 4 (four) times daily.    levocetirizine (XYZAL) 5 MG tablet Take 1 tablet (5 mg total) by mouth every evening.    montelukast (SINGULAIR) 10 mg tablet TAKE 1 TABLET(10 MG) BY MOUTH EVERY EVENING    pen needle, diabetic 32 gauge x 1/5" Ndle 1 application by Misc.(Non-Drug; Combo Route) route 2 (two) times daily.    pen needle, diabetic 32 gauge x 1/6" Ndle 1 application by Misc.(Non-Drug; Combo Route) route 2 (two) times daily.    pregabalin (LYRICA) 100 MG capsule Take 1 capsule (100 mg total) by mouth 2 (two) times daily.    simvastatin (ZOCOR) 40 MG tablet Take 1 tablet by mouth  every evening    spironolactone (ALDACTONE) 25 MG tablet Take 1 tablet by mouth once a day    TRUETEST TEST STRIPS Strp 1 strip by Misc.(Non-Drug; Combo Route) route 4 (four) times daily.    pantoprazole (PROTONIX) 40 MG tablet Take 1 tablet (40 mg total) by mouth once daily.           Clinical Reference Information           Vital Signs - Last Recorded  Most recent update: 1/27/2017 10:08 AM by Marcelina Pat MA    BP Pulse Ht Wt BMI    (!) 123/58 (BP Location: Left arm, Patient Position: Sitting, BP Method: Automatic) 65 5' 5" (1.651 m) (!) 139.1 kg (306 lb 10.6 oz) 51.03 kg/m2      Blood Pressure          Most Recent Value    Right Arm BP - Sitting  125/58    Left Arm BP - Sitting  123/58    BP  (!)  123/58      Allergies as of 1/27/2017     Iodinated Contrast Media - Iv Dye      Immunizations Administered on Date of Encounter - 1/27/2017     None      Orders Placed During Today's Visit      Normal Orders This Visit    Ambulatory Referral to Electrophysiology     Future Labs/Procedures " Expected by Expires    Holter monitor - 48 hour  As directed 1/27/2018      Instructions    Change Omeprazole to pantoprazole once daily.

## 2017-01-27 NOTE — PROGRESS NOTES
Subjective:   Patient ID:  Annika Mac is a 66 y.o. female who presents for follow-up of Loss of Consciousness (Ed d/c 1/26/17); Hypertension (3 month f/u ); and Fatigue      HPI:   Annika Mac presents for follow up of hypertension.Blood pressure currently is well controlled. Her cough was not improved by switching to an ARB. She is now having recurrent episodes of cough syncope with multiple episodes yesterday resulting in an ED visit. All episodes occur after spasms of coughing. Pulmonary has seen the patient and it may be due to reflux. She has sinus drainage. Sams has diastolic dysfunction with no worsening SOB. Limited by left foot pain. Annika Mac has dyslipidemia  on moderate intensity statin therapy..  Review of Systems   Constitution: Negative for weakness, malaise/fatigue, weight gain and weight loss.   HENT: Positive for congestion. Negative for headaches.    Eyes: Negative for blurred vision.   Cardiovascular: Positive for dyspnea on exertion. Negative for chest pain, claudication, cyanosis, irregular heartbeat, leg swelling, near-syncope, orthopnea, palpitations, paroxysmal nocturnal dyspnea and syncope.   Respiratory: Positive for cough. Negative for shortness of breath and wheezing.         RUFINA   Musculoskeletal: Positive for joint pain. Negative for falls and myalgias.        Left foot pain   Gastrointestinal: Negative for abdominal pain, heartburn, nausea and vomiting.   Genitourinary: Negative for nocturia.   Neurological: Negative for brief paralysis, dizziness, focal weakness, numbness and paresthesias.   Psychiatric/Behavioral: Negative for altered mental status.       Current Outpatient Prescriptions   Medication Sig    albuterol 90 mcg/actuation inhaler Inhale 2 puffs into the lungs every 6 (six) hours as needed for Wheezing.    allopurinol (ZYLOPRIM) 100 MG tablet Take 1 tablet by mouth once daily    aspirin 81 MG Chew Take 1 tablet (81 mg total) by mouth once daily.    azelastine  "(ASTELIN) 137 mcg (0.1 %) nasal spray 1 spray (137 mcg total) by Nasal route 2 (two) times daily.    BD INSULIN SYRINGE ULTRA-FINE 0.3 mL 30 gauge x 1/2" Syrg USE THREE TIMES DAILY FOR INSULIN INJECTIONS    blood-glucose meter kit Use as instructed, true test/result meter    colchicine 0.6 mg tablet Take 1 tablet (0.6 mg total) by mouth once daily.    duloxetine (CYMBALTA) 30 MG capsule Take 1 capsule (30 mg total) by mouth once daily.    fexofenadine (ALLEGRA) 180 MG tablet Take 1 tablet (180 mg total) by mouth once daily.    fluticasone (FLONASE) 50 mcg/actuation nasal spray 2 sprays by Each Nare route once daily.    furosemide (LASIX) 20 MG tablet Take 1 tablet by mouth once a day    INSULIN REGULAR, HUMAN (INSULIN REGULAR HUM U-500 CONC SUBQ) Inject into the skin. 17 unit trina with meals     irbesartan (AVAPRO) 300 MG tablet Take 1 tablet (300 mg total) by mouth every evening.    labetalol (NORMODYNE) 300 MG tablet Take 1 tablet by mouth two  times daily    lancets 31 gauge Misc 1 lancet by Misc.(Non-Drug; Combo Route) route 4 (four) times daily.    levocetirizine (XYZAL) 5 MG tablet Take 1 tablet (5 mg total) by mouth every evening.    montelukast (SINGULAIR) 10 mg tablet TAKE 1 TABLET(10 MG) BY MOUTH EVERY EVENING    pen needle, diabetic 32 gauge x 1/5" Ndle 1 application by Misc.(Non-Drug; Combo Route) route 2 (two) times daily.    pen needle, diabetic 32 gauge x 1/6" Ndle 1 application by Misc.(Non-Drug; Combo Route) route 2 (two) times daily.    pregabalin (LYRICA) 100 MG capsule Take 1 capsule (100 mg total) by mouth 2 (two) times daily.    simvastatin (ZOCOR) 40 MG tablet Take 1 tablet by mouth  every evening    spironolactone (ALDACTONE) 25 MG tablet Take 1 tablet by mouth once a day    TRUETEST TEST STRIPS Strp 1 strip by Misc.(Non-Drug; Combo Route) route 4 (four) times daily.    pantoprazole (PROTONIX) 40 MG tablet Take 1 tablet (40 mg total) by mouth once daily.     No current " "facility-administered medications for this visit.      Objective:   Physical Exam   Constitutional: She is oriented to person, place, and time. She appears well-developed. No distress.   BP (!) 123/58 (BP Location: Left arm, Patient Position: Sitting, BP Method: Automatic)  Pulse 65  Ht 5' 5" (1.651 m)  Wt (!) 139.1 kg (306 lb 10.6 oz)  BMI 51.03 kg/m2   HENT:   Head: Normocephalic.   Eyes: Conjunctivae are normal. Pupils are equal, round, and reactive to light. No scleral icterus.   Neck: Neck supple. No JVD present. No thyromegaly present.   Cardiovascular: Normal rate, regular rhythm and intact distal pulses.  PMI is not displaced.  Exam reveals no gallop and no friction rub.    Murmur heard.   Medium-pitched midsystolic murmur is present with a grade of 2/6  at the upper left sternal border, lower left sternal border  Pulmonary/Chest: Effort normal and breath sounds normal. No respiratory distress. She has no wheezes. She has no rales.   Coughing   Abdominal: Soft. She exhibits no distension. There is no splenomegaly or hepatomegaly. There is no tenderness.   Musculoskeletal: She exhibits no edema or tenderness.   Gait normal   Neurological: She is alert and oriented to person, place, and time.   Skin: Skin is warm and dry. She is not diaphoretic.   Psychiatric: She has a normal mood and affect. Her behavior is normal.       Lab Results   Component Value Date     01/26/2017    K 4.7 01/26/2017     01/26/2017    CO2 25 01/26/2017    BUN 25 (H) 01/26/2017    CREATININE 1.6 (H) 01/26/2017     01/26/2017    HGBA1C 9.1 (H) 11/25/2016    MG 1.7 05/29/2015    AST 21 01/26/2017    ALT 12 01/26/2017    ALBUMIN 3.4 (L) 01/26/2017    PROT 7.3 01/26/2017    BILITOT 0.3 01/26/2017    WBC 5.68 01/26/2017    HGB 9.9 (L) 01/26/2017    HCT 29.7 (L) 01/26/2017    MCV 83 01/26/2017     01/26/2017    TSH 1.388 04/29/2016    CHOL 160 04/29/2016    HDL 33 (L) 04/29/2016    LDLCALC 84.6 04/29/2016    TRIG 212 " (H) 04/29/2016       Assessment:     1. Essential hypertension: Good control.    2. Diastolic dysfunction : Compensated   3. Cough syncope    4. Dyslipidemia :  on moderate intensity statin therapy.   5. Morbid obesity due to excess calories    6. Gastroesophageal reflux disease, esophagitis presence not specified : possibly contributing to #3       Plan:     Annika was seen today for loss of consciousness, hypertension and fatigue.    Diagnoses and all orders for this visit:    Essential hypertension  Continue current regimen    Diastolic dysfunction    Cough syncope  -     Holter monitor - 48 hour; Future  -     Ambulatory Referral to Electrophysiology ( Dr Jo Obando)    Dyslipidemia  Continue current regimen    Morbid obesity due to excess calories    Gastroesophageal reflux disease, esophagitis presence not specified  -     pantoprazole (PROTONIX) 40 MG tablet; Take 1 tablet (40 mg total) by mouth once daily.

## 2017-01-30 ENCOUNTER — TELEPHONE (OUTPATIENT)
Dept: INTERNAL MEDICINE | Facility: CLINIC | Age: 67
End: 2017-01-30

## 2017-01-30 DIAGNOSIS — R55 COUGH SYNCOPE: Primary | ICD-10-CM

## 2017-01-30 DIAGNOSIS — R05.4 COUGH SYNCOPE: Primary | ICD-10-CM

## 2017-02-01 ENCOUNTER — TELEPHONE (OUTPATIENT)
Dept: PODIATRY | Facility: CLINIC | Age: 67
End: 2017-02-01

## 2017-02-03 ENCOUNTER — OFFICE VISIT (OUTPATIENT)
Dept: INTERNAL MEDICINE | Facility: CLINIC | Age: 67
End: 2017-02-03
Payer: MEDICARE

## 2017-02-03 ENCOUNTER — CLINICAL SUPPORT (OUTPATIENT)
Dept: ELECTROPHYSIOLOGY | Facility: CLINIC | Age: 67
End: 2017-02-03
Payer: MEDICARE

## 2017-02-03 VITALS — DIASTOLIC BLOOD PRESSURE: 63 MMHG | SYSTOLIC BLOOD PRESSURE: 118 MMHG | HEART RATE: 70 BPM

## 2017-02-03 DIAGNOSIS — G47.33 OBSTRUCTIVE SLEEP APNEA: Chronic | ICD-10-CM

## 2017-02-03 DIAGNOSIS — R05.4 COUGH SYNCOPE: ICD-10-CM

## 2017-02-03 DIAGNOSIS — I63.9 CEREBROVASCULAR ACCIDENT (CVA), UNSPECIFIED MECHANISM: Chronic | ICD-10-CM

## 2017-02-03 DIAGNOSIS — Z79.4 TYPE 2 DIABETES MELLITUS WITH DIABETIC POLYNEUROPATHY, WITH LONG-TERM CURRENT USE OF INSULIN: Primary | ICD-10-CM

## 2017-02-03 DIAGNOSIS — R55 COUGH SYNCOPE: ICD-10-CM

## 2017-02-03 DIAGNOSIS — E11.42 TYPE 2 DIABETES MELLITUS WITH DIABETIC POLYNEUROPATHY, WITH LONG-TERM CURRENT USE OF INSULIN: Primary | ICD-10-CM

## 2017-02-03 DIAGNOSIS — I10 ESSENTIAL HYPERTENSION: ICD-10-CM

## 2017-02-03 DIAGNOSIS — E78.5 DYSLIPIDEMIA: Chronic | ICD-10-CM

## 2017-02-03 DIAGNOSIS — N18.30 CKD (CHRONIC KIDNEY DISEASE) STAGE 3, GFR 30-59 ML/MIN: Chronic | ICD-10-CM

## 2017-02-03 PROCEDURE — 99211 OFF/OP EST MAY X REQ PHY/QHP: CPT | Mod: PBBFAC | Performed by: FAMILY MEDICINE

## 2017-02-03 PROCEDURE — 93225 XTRNL ECG REC<48 HRS REC: CPT | Mod: PBBFAC | Performed by: INTERNAL MEDICINE

## 2017-02-03 PROCEDURE — 99496 TRANSJ CARE MGMT HIGH F2F 7D: CPT | Mod: PBBFAC | Performed by: FAMILY MEDICINE

## 2017-02-03 PROCEDURE — 99213 OFFICE O/P EST LOW 20 MIN: CPT | Mod: S$PBB,,, | Performed by: FAMILY MEDICINE

## 2017-02-03 PROCEDURE — 93226 XTRNL ECG REC<48 HR SCAN A/R: CPT | Mod: PBBFAC | Performed by: INTERNAL MEDICINE

## 2017-02-03 PROCEDURE — 93227 XTRNL ECG REC<48 HR R&I: CPT | Mod: S$PBB,,, | Performed by: INTERNAL MEDICINE

## 2017-02-03 PROCEDURE — 99999 PR PBB SHADOW E&M-EST. PATIENT-LVL I: CPT | Mod: PBBFAC,,, | Performed by: FAMILY MEDICINE

## 2017-02-03 NOTE — PROGRESS NOTES
Subjective:       Patient ID: Annika Mac is a 66 y.o. female.    Chief Complaint: No chief complaint on file.  Annika Mac 66 y.o. female is here for office visit to review care and physical exam, reports picked-up Holter per [facility], apparently had cough, passed out.  No recent episode, returns monitor to [facility] on Monday.  CBGs are high, titrating according to Endo.  Still would like to change to long acting insulin and GLP-1 if afordable.  Transitional Care Note    Family and/or Caretaker present at visit? yes .  Diagnostic tests reviewed/disposition: yes.  Disease/illness education:  yes  Home health/community services discussion/referrals: no.   Establishment or re-establishment of referral orders for community resources: .no  Discussion with other health care providers: yes.         HPI  Review of Systems   Constitutional: Negative for activity change, fatigue, fever and unexpected weight change.   HENT: Negative for congestion, hearing loss, postnasal drip and rhinorrhea.    Eyes: Negative for redness and visual disturbance.   Respiratory: Negative for chest tightness, shortness of breath and wheezing.    Cardiovascular: Negative for chest pain, palpitations and leg swelling.   Gastrointestinal: Negative for abdominal distention.   Genitourinary: Negative for decreased urine volume, dysuria, flank pain, hematuria, pelvic pain and urgency.   Musculoskeletal: Negative for back pain, gait problem, joint swelling and neck stiffness.   Skin: Negative for color change, rash and wound.   Neurological: Negative for dizziness, syncope, weakness and headaches.   Psychiatric/Behavioral: Negative for behavioral problems, confusion and sleep disturbance. The patient is not nervous/anxious.        Objective:      Physical Exam   Constitutional: She is oriented to person, place, and time. She appears well-developed and well-nourished. No distress.   HENT:   Head: Normocephalic.   Mouth/Throat: No oropharyngeal exudate.    Eyes: EOM are normal. Pupils are equal, round, and reactive to light. No scleral icterus.   Neck: Neck supple. No JVD present. No thyromegaly present.   Cardiovascular: Normal rate, regular rhythm and normal heart sounds.  Exam reveals no gallop and no friction rub.    No murmur heard.  Pulmonary/Chest: Effort normal and breath sounds normal. She has no wheezes. She has no rales.   Abdominal: Soft. Bowel sounds are normal. She exhibits no distension and no mass. There is no tenderness. There is no guarding.   Musculoskeletal: Normal range of motion. She exhibits no edema.   Lymphadenopathy:     She has no cervical adenopathy.   Neurological: She is alert and oriented to person, place, and time. She has normal reflexes. She displays normal reflexes. No cranial nerve deficit. She exhibits normal muscle tone.   Skin: Skin is warm. No rash noted. No erythema.   Psychiatric: She has a normal mood and affect. Thought content normal.       Assessment:       No diagnosis found.    Plan:       Diagnoses and all orders for this visit:    Type 2 diabetes mellitus with diabetic polyneuropathy, with long-term current use of insulin  - Again will discuss with Pharm if can change from U 500 which does not seem to be working pout for pt  CKD (chronic kidney disease) stage 3, GFR 30-59 ml/min  - control risk  Dyslipidemia  - follow  Essential hypertension  - reviewed, home BPs advised again, bring to [facility] f/u  Obstructive sleep apnea  - uses CPAP  Cerebrovascular accident (CVA), unspecified mechanism  - control risk  Cough syncope  - Has follow up with monitor review

## 2017-02-03 NOTE — MR AVS SNAPSHOT
[facility]  [address]  [facility] [address]  Phone: [phone]  Fax: [phone]                  Annika Mac   2/3/2017 11:20 AM   Office Visit    Description:  Female : 1950   Provider:  Cody Villatoro MD   Department:  [facility]           Diagnoses this Visit        Comments    Type 2 diabetes mellitus with diabetic polyneuropathy, with long-term current use of insulin    -  Primary     CKD (chronic kidney disease) stage 3, GFR 30-59 ml/min         Dyslipidemia         Essential hypertension         Obstructive sleep apnea         Cerebrovascular accident (CVA), unspecified mechanism         Cough syncope                To Do List           Future Appointments        Provider Department Dept Phone    2017 9:30 AM EKG, APPT [facility] [phone]    2017 10:00 AM Roshan Machado MD [facility] - Arrhythmia [phone]    2017 10:00 AM Paulino Galicia DPM [facility] Podiatry [phone]    2017 1:30 PM Micaela Barrientos MD [facility]-Physical Med & Rehab [phone]    3/21/2017 9:00 AM [facility] [place] [phone]      Goals (5 Years of Data)              16    HEMOGLOBIN A1C < 7.5   9.1  10.8  8.6      Follow-Up and Disposition     Return in about 4 weeks (around 3/3/2017), or if symptoms worsen or fail to improve.      [facility]     [facility] On Call Nurse Care Line -  Assistance  Registered nurses in the [facility] On Call Center provide clinical advisement, health education, appointment booking, and other advisory services.  Call for this free service at [phone].             Medications           Message regarding Medications     Verify the changes and/or additions to your medication regime listed below are the same as discussed with your clinician today.  If any of these changes or additions are incorrect, please notify your healthcare  "provider.             Verify that the below list of medications is an accurate representation of the medications you are currently taking.  If none reported, the list may be blank. If incorrect, please contact your healthcare provider. Carry this list with you in case of emergency.           Current Medications     albuterol 90 mcg/actuation inhaler Inhale 2 puffs into the lungs every 6 (six) hours as needed for Wheezing.    allopurinol (ZYLOPRIM) 100 MG tablet Take 1 tablet by mouth once daily    aspirin 81 MG Chew Take 1 tablet (81 mg total) by mouth once daily.    azelastine (ASTELIN) 137 mcg (0.1 %) nasal spray 1 spray (137 mcg total) by Nasal route 2 (two) times daily.    BD INSULIN SYRINGE ULTRA-FINE 0.3 mL 30 gauge x 1/2" Syrg USE THREE TIMES DAILY FOR INSULIN INJECTIONS    blood-glucose meter kit Use as instructed, true test/result meter    colchicine 0.6 mg tablet Take 1 tablet (0.6 mg total) by mouth once daily.    duloxetine (CYMBALTA) 30 MG capsule Take 1 capsule (30 mg total) by mouth once daily.    fexofenadine (ALLEGRA) 180 MG tablet Take 1 tablet (180 mg total) by mouth once daily.    fluticasone (FLONASE) 50 mcg/actuation nasal spray 2 sprays by Each Nare route once daily.    furosemide (LASIX) 20 MG tablet Take 1 tablet by mouth once a day    INSULIN REGULAR, HUMAN (INSULIN REGULAR HUM U-500 CONC SUBQ) Inject into the skin. 17 unit trina with meals     irbesartan (AVAPRO) 300 MG tablet Take 1 tablet (300 mg total) by mouth every evening.    labetalol (NORMODYNE) 300 MG tablet Take 1 tablet by mouth two  times daily    lancets 31 gauge Misc 1 lancet by Misc.(Non-Drug; Combo Route) route 4 (four) times daily.    levocetirizine (XYZAL) 5 MG tablet Take 1 tablet (5 mg total) by mouth every evening.    montelukast (SINGULAIR) 10 mg tablet TAKE 1 TABLET(10 MG) BY MOUTH EVERY EVENING    pantoprazole (PROTONIX) 40 MG tablet Take 1 tablet (40 mg total) by mouth once daily.    pen needle, diabetic 32 gauge x " "1/5" Ndle 1 application by Misc.(Non-Drug; Combo Route) route 2 (two) times daily.    pen needle, diabetic 32 gauge x 1/6" Ndle 1 application by Misc.(Non-Drug; Combo Route) route 2 (two) times daily.    pregabalin (LYRICA) 100 MG capsule Take 1 capsule (100 mg total) by mouth 2 (two) times daily.    simvastatin (ZOCOR) 40 MG tablet Take 1 tablet by mouth  every evening    spironolactone (ALDACTONE) 25 MG tablet Take 1 tablet by mouth once a day    TRUETEST TEST STRIPS Strp 1 strip by Misc.(Non-Drug; Combo Route) route 4 (four) times daily.           Clinical Reference Information           Your Vitals Were     BP Pulse                118/63 70          Blood Pressure          Most Recent Value    BP  118/63      Allergies as of 2/3/2017     Iodinated Contrast Media - Iv Dye      Immunizations Administered on Date of Encounter - 2/3/2017     None      Language Assistance Services     ATTENTION: Language assistance services are available, free of charge. Please call [phone].      ATENCIÓN: Si sneha dominguez, tiene a mckinnon disposición servicios gratuitos de asistencia lingüística. Llame al [phone].     JONAS Ý: N?u b?n nói Ti?ng Vi?t, có các d?ch v? h? tr? ngôn ng? mi?n phí dành cho b?n. G?i s? [phone].         [facility] complies with applicable Federal civil rights laws and does not discriminate on the basis of race, color, national origin, age, disability, or sex.        "

## 2017-02-09 ENCOUNTER — HOSPITAL ENCOUNTER (OUTPATIENT)
Dept: CARDIOLOGY | Facility: CLINIC | Age: 67
Discharge: HOME | End: 2017-02-09
Payer: MEDICARE

## 2017-02-09 ENCOUNTER — INITIAL CONSULT (OUTPATIENT)
Dept: ELECTROPHYSIOLOGY | Facility: CLINIC | Age: 67
End: 2017-02-09
Payer: MEDICARE

## 2017-02-09 VITALS
WEIGHT: 293 LBS | HEIGHT: 65 IN | BODY MASS INDEX: 48.82 KG/M2 | DIASTOLIC BLOOD PRESSURE: 66 MMHG | HEART RATE: 75 BPM | SYSTOLIC BLOOD PRESSURE: 122 MMHG

## 2017-02-09 DIAGNOSIS — I10 ESSENTIAL HYPERTENSION: ICD-10-CM

## 2017-02-09 DIAGNOSIS — Z79.4 TYPE 2 DIABETES MELLITUS WITH DIABETIC POLYNEUROPATHY, WITH LONG-TERM CURRENT USE OF INSULIN: ICD-10-CM

## 2017-02-09 DIAGNOSIS — I51.89 DIASTOLIC DYSFUNCTION: Chronic | ICD-10-CM

## 2017-02-09 DIAGNOSIS — R55 COUGH SYNCOPE: Primary | ICD-10-CM

## 2017-02-09 DIAGNOSIS — R05.9 COUGH: ICD-10-CM

## 2017-02-09 DIAGNOSIS — G63 POLYNEUROPATHY ASSOCIATED WITH UNDERLYING DISEASE: ICD-10-CM

## 2017-02-09 DIAGNOSIS — G47.33 OBSTRUCTIVE SLEEP APNEA: Chronic | ICD-10-CM

## 2017-02-09 DIAGNOSIS — R05.4 COUGH SYNCOPE: ICD-10-CM

## 2017-02-09 DIAGNOSIS — G89.29 CHRONIC LOW BACK PAIN, UNSPECIFIED BACK PAIN LATERALITY, WITH SCIATICA PRESENCE UNSPECIFIED: ICD-10-CM

## 2017-02-09 DIAGNOSIS — I63.9 CEREBROVASCULAR ACCIDENT (CVA), UNSPECIFIED MECHANISM: Chronic | ICD-10-CM

## 2017-02-09 DIAGNOSIS — N18.30 CKD (CHRONIC KIDNEY DISEASE) STAGE 3, GFR 30-59 ML/MIN: Chronic | ICD-10-CM

## 2017-02-09 DIAGNOSIS — E66.01 MORBID OBESITY DUE TO EXCESS CALORIES: Chronic | ICD-10-CM

## 2017-02-09 DIAGNOSIS — R05.4 COUGH SYNCOPE: Primary | ICD-10-CM

## 2017-02-09 DIAGNOSIS — M54.5 CHRONIC LOW BACK PAIN, UNSPECIFIED BACK PAIN LATERALITY, WITH SCIATICA PRESENCE UNSPECIFIED: ICD-10-CM

## 2017-02-09 DIAGNOSIS — I73.9 PAD (PERIPHERAL ARTERY DISEASE): Chronic | ICD-10-CM

## 2017-02-09 DIAGNOSIS — R55 COUGH SYNCOPE: ICD-10-CM

## 2017-02-09 DIAGNOSIS — E11.42 TYPE 2 DIABETES MELLITUS WITH DIABETIC POLYNEUROPATHY, WITH LONG-TERM CURRENT USE OF INSULIN: ICD-10-CM

## 2017-02-09 DIAGNOSIS — E78.5 DYSLIPIDEMIA: Chronic | ICD-10-CM

## 2017-02-09 PROCEDURE — 93010 ELECTROCARDIOGRAM REPORT: CPT | Mod: S$PBB,,, | Performed by: INTERNAL MEDICINE

## 2017-02-09 PROCEDURE — 99213 OFFICE O/P EST LOW 20 MIN: CPT | Mod: PBBFAC | Performed by: INTERNAL MEDICINE

## 2017-02-09 PROCEDURE — 99205 OFFICE O/P NEW HI 60 MIN: CPT | Mod: S$PBB,,, | Performed by: INTERNAL MEDICINE

## 2017-02-09 PROCEDURE — 99999 PR PBB SHADOW E&M-EST. PATIENT-LVL III: CPT | Mod: PBBFAC,,, | Performed by: INTERNAL MEDICINE

## 2017-02-09 NOTE — PROGRESS NOTES
"  Subjective:   Patient ID:  Annika Mac is a 66 y.o. female     Chief complaint:Loss of Consciousness      HPI  Referred by Dr Mcguire for eval of syncope  She has been having cough attacks that are followed by syncope since August -- had 4 episodes.   Does not smoke  Morbidly obese with all of the expected obesity related issues (DM,HTN, sleep apnea, OA, etc)  She is due for knee surgery but she was declined due to wt (I guess out of concerns re:local cx, non healing, rehab post op etc)  I have reviewed the actual image of the ECG tracing obtained today and it shows NSR with normal intervals    Current Outpatient Prescriptions   Medication Sig    albuterol 90 mcg/actuation inhaler Inhale 2 puffs into the lungs every 6 (six) hours as needed for Wheezing.    allopurinol (ZYLOPRIM) 100 MG tablet Take 1 tablet by mouth once daily    aspirin 81 MG Chew Take 1 tablet (81 mg total) by mouth once daily.    azelastine (ASTELIN) 137 mcg (0.1 %) nasal spray 1 spray (137 mcg total) by Nasal route 2 (two) times daily.    BD INSULIN SYRINGE ULTRA-FINE 0.3 mL 30 gauge x 1/2" Syrg USE THREE TIMES DAILY FOR INSULIN INJECTIONS    blood-glucose meter kit Use as instructed, true test/result meter    colchicine 0.6 mg tablet Take 1 tablet (0.6 mg total) by mouth once daily.    duloxetine (CYMBALTA) 30 MG capsule Take 1 capsule (30 mg total) by mouth once daily.    fexofenadine (ALLEGRA) 180 MG tablet Take 1 tablet (180 mg total) by mouth once daily.    fluticasone (FLONASE) 50 mcg/actuation nasal spray 2 sprays by Each Nare route once daily.    furosemide (LASIX) 20 MG tablet Take 1 tablet by mouth once a day    INSULIN REGULAR, HUMAN (INSULIN REGULAR HUM U-500 CONC SUBQ) Inject into the skin. 17 unit trina with meals     irbesartan (AVAPRO) 300 MG tablet Take 1 tablet (300 mg total) by mouth every evening.    labetalol (NORMODYNE) 300 MG tablet Take 1 tablet by mouth two  times daily    lancets 31 gauge Misc 1 lancet by " "Misc.(Non-Drug; Combo Route) route 4 (four) times daily.    levocetirizine (XYZAL) 5 MG tablet Take 1 tablet (5 mg total) by mouth every evening.    pantoprazole (PROTONIX) 40 MG tablet Take 1 tablet (40 mg total) by mouth once daily.    pen needle, diabetic 32 gauge x 1/5" Ndle 1 application by Misc.(Non-Drug; Combo Route) route 2 (two) times daily.    pen needle, diabetic 32 gauge x 1/6" Ndle 1 application by Misc.(Non-Drug; Combo Route) route 2 (two) times daily.    pregabalin (LYRICA) 100 MG capsule Take 1 capsule (100 mg total) by mouth 2 (two) times daily.    simvastatin (ZOCOR) 40 MG tablet Take 1 tablet by mouth  every evening    spironolactone (ALDACTONE) 25 MG tablet Take 1 tablet by mouth once a day    TRUETEST TEST STRIPS Strp 1 strip by Misc.(Non-Drug; Combo Route) route 4 (four) times daily.    montelukast (SINGULAIR) 10 mg tablet TAKE 1 TABLET(10 MG) BY MOUTH EVERY EVENING     No current facility-administered medications for this visit.      Review of Systems   Constitution: Negative for decreased appetite, weakness, weight gain and weight loss.   HENT: Negative for headaches and nosebleeds.    Eyes: Negative for blurred vision and visual disturbance.   Cardiovascular: Negative for chest pain, claudication, cyanosis, dyspnea on exertion, irregular heartbeat, leg swelling, near-syncope, orthopnea, palpitations, paroxysmal nocturnal dyspnea and syncope.   Respiratory: Positive for shortness of breath. Negative for cough and wheezing.    Endocrine: Negative for heat intolerance.   Skin: Negative for rash.   Musculoskeletal: Negative for muscle weakness and myalgias.   Gastrointestinal: Negative for abdominal pain, anorexia, melena, nausea and vomiting.   Genitourinary: Negative for menorrhagia.   Neurological: Negative for excessive daytime sleepiness, dizziness, loss of balance, seizures and vertigo.   Psychiatric/Behavioral: Negative for altered mental status and depression. The patient is not " nervous/anxious.        Objective:   Physical Exam   Constitutional: She is oriented to person, place, and time. She appears well-developed and well-nourished.   Overweight   HENT:   Head: Normocephalic and atraumatic.   Right Ear: External ear normal.   Left Ear: External ear normal.   Eyes: Conjunctivae are normal. Pupils are equal, round, and reactive to light. Left eye exhibits no discharge. Left conjunctiva is not injected. Left conjunctiva has no hemorrhage. No scleral icterus.   Neck: Normal range of motion. Neck supple. No thyromegaly present.   Cardiovascular: Normal rate, regular rhythm, normal heart sounds and intact distal pulses.  Exam reveals no gallop, no S3, no S4, no friction rub, no midsystolic click and no opening snap.    No murmur heard.  Pulses:       Carotid pulses are 2+ on the right side, and 2+ on the left side.       Radial pulses are 2+ on the right side, and 2+ on the left side.        Dorsalis pedis pulses are 2+ on the right side, and 2+ on the left side.        Posterior tibial pulses are 2+ on the right side, and 2+ on the left side.   Pulmonary/Chest: Effort normal and breath sounds normal.   Abdominal: Soft. She exhibits no distension and no ascites. There is no hepatomegaly. There is no tenderness. There is no rigidity and no guarding.   Obese abdomen   Musculoskeletal:        Right ankle: She exhibits no swelling.        Left ankle: She exhibits no swelling.        Right lower leg: She exhibits no swelling.        Left lower leg: She exhibits no swelling.   Neurological: She is alert and oriented to person, place, and time. She has normal strength. No cranial nerve deficit. Gait normal.   Skin: Skin is warm and dry. No rash noted. No cyanosis. Nails show no clubbing.   Psychiatric: She has a normal mood and affect. Her speech is normal and behavior is normal. Thought content normal. Cognition and memory are normal.   Nursing note and vitals reviewed.    Visit Vitals    /66  "   Pulse 75    Ht 5' 5" (1.651 m)    Wt (!) 138.8 kg (306 lb)    BMI 50.92 kg/m2        Assessment:      1. Cough syncope    2. Morbid obesity due to excess calories    3. Cerebrovascular accident (CVA), unspecified mechanism    4. Essential hypertension    5. PAD (peripheral artery disease)    6. Obstructive sleep apnea    7. CKD (chronic kidney disease) stage 3, GFR 30-59 ml/min    8. Type 2 diabetes mellitus with diabetic polyneuropathy, with long-term current use of insulin    9. Dyslipidemia    10. Chronic low back pain, unspecified back pain laterality, with sciatica presence unspecified    11. Polyneuropathy associated with underlying disease    12. Diastolic dysfunction    13. Cough        Plan:    I performed CSM tests and they are predictably normal  I explained that cough syncope has only a situational Rx - ie when a bout of cough starts, try to lean back on a sofa or a bed lying on the side etc  Her main issue though is morbid obesity and wt loss would definitely help with the cough syncope but more importantly, it would help with many other aspects of her serious medical conditions.  I referred her to the bariatric class/seminar  No orders of the defined types were placed in this encounter.    No Follow-up on file.  There are no discontinued medications.  New Prescriptions    No medications on file     Modified Medications    No medications on file              "

## 2017-02-09 NOTE — LETTER
February 10, 2017      Larry Mcguire MD  [address]  [facility] [address]           [facility]  [address]  [facility] [address]  Phone: [phone]  Fax: [phone]          Patient: Annika Mac   MR Number: [id number]   YOB: 1950   Date of Visit: 2/9/2017       Dear Dr. Larry Mcguire:    Thank you for referring Annika Mac to me for evaluation. Attached you will find relevant portions of my assessment and plan of care.    If you have questions, please do not hesitate to call me. I look forward to following Annika Mac along with you.    Sincerely,    Roshan Machado MD    Enclosure  CC:  No Recipients    If you would like to receive this communication electronically, please contact [email or web address] or [phone] to request more information on [employer] access.    For providers and/or their staff who would like to refer a patient to [facility], please contact us through our one-stop-shop provider referral line, [facility], at [phone].    If you feel you have received this communication in error or would no longer like to receive these types of communications, please e-mail [email or web address]

## 2017-02-09 NOTE — MR AVS SNAPSHOT
[facility]  [address]  [place] [address]  Phone: [phone]  Fax: [phone]                  Annika Mac   2017 10:00 AM   Initial consult    Description:  Female : 1950   Provider:  Roshan Machado MD   Department:  [facility]           Reason for Visit     Loss of Consciousness           Diagnoses this Visit        Comments    Cerebrovascular accident (CVA), unspecified mechanism    -  Primary     Essential hypertension         PAD (peripheral artery disease)         Obstructive sleep apnea         Cough syncope         CKD (chronic kidney disease) stage 3, GFR 30-59 ml/min         Type 2 diabetes mellitus with diabetic polyneuropathy, with long-term current use of insulin         Dyslipidemia         Morbid obesity due to excess calories                To Do List           Future Appointments        Provider Department Dept Phone    2017 10:00 AM L[facility] - Podiatry [phone]    2017 1:40 PM MD Miguel Coello [facility]-Physical Med & Rehab [phone]    3/21/2017 9:00 AM LABORATORY, [place] [place] [phone]    3/28/2017 9:00 AM LOUISA Castro [facility] - Endocrinology [phone]    3/28/2017 11:00 AM DIABETES EDUCATOR, INT MED [facility] [phone]      Goals (5 Years of Data)              16    HEMOGLOBIN A1C < 7.5   9.1  10.8  8.6      [facility] Call     [facility] On Call Nurse Care Line -  Assistance  Registered nurses in the [facility] provide clinical advisement, health education, appointment booking, and other advisory services.  Call for this free service at [phone].             Medications           Message regarding Medications     Verify the changes and/or additions to your medication regime listed below are the same as discussed with your clinician today.  If any of these changes or additions  "are incorrect, please notify your healthcare provider.             Verify that the below list of medications is an accurate representation of the medications you are currently taking.  If none reported, the list may be blank. If incorrect, please contact your healthcare provider. Carry this list with you in case of emergency.           Current Medications     albuterol 90 mcg/actuation inhaler Inhale 2 puffs into the lungs every 6 (six) hours as needed for Wheezing.    allopurinol (ZYLOPRIM) 100 MG tablet Take 1 tablet by mouth once daily    aspirin 81 MG Chew Take 1 tablet (81 mg total) by mouth once daily.    azelastine (ASTELIN) 137 mcg (0.1 %) nasal spray 1 spray (137 mcg total) by Nasal route 2 (two) times daily.    BD INSULIN SYRINGE ULTRA-FINE 0.3 mL 30 gauge x 1/2" Syrg USE THREE TIMES DAILY FOR INSULIN INJECTIONS    blood-glucose meter kit Use as instructed, true test/result meter    colchicine 0.6 mg tablet Take 1 tablet (0.6 mg total) by mouth once daily.    duloxetine (CYMBALTA) 30 MG capsule Take 1 capsule (30 mg total) by mouth once daily.    fexofenadine (ALLEGRA) 180 MG tablet Take 1 tablet (180 mg total) by mouth once daily.    fluticasone (FLONASE) 50 mcg/actuation nasal spray 2 sprays by Each Nare route once daily.    furosemide (LASIX) 20 MG tablet Take 1 tablet by mouth once a day    INSULIN REGULAR, HUMAN (INSULIN REGULAR HUM U-500 CONC SUBQ) Inject into the skin. 17 unit trina with meals     irbesartan (AVAPRO) 300 MG tablet Take 1 tablet (300 mg total) by mouth every evening.    labetalol (NORMODYNE) 300 MG tablet Take 1 tablet by mouth two  times daily    lancets 31 gauge Misc 1 lancet by Misc.(Non-Drug; Combo Route) route 4 (four) times daily.    levocetirizine (XYZAL) 5 MG tablet Take 1 tablet (5 mg total) by mouth every evening.    pantoprazole (PROTONIX) 40 MG tablet Take 1 tablet (40 mg total) by mouth once daily.    pen needle, diabetic 32 gauge x 1/5" Ndle 1 application by " "Misc.(Non-Drug; Combo Route) route 2 (two) times daily.    pen needle, diabetic 32 gauge x 1/6" Ndle 1 application by Misc.(Non-Drug; Combo Route) route 2 (two) times daily.    pregabalin (LYRICA) 100 MG capsule Take 1 capsule (100 mg total) by mouth 2 (two) times daily.    simvastatin (ZOCOR) 40 MG tablet Take 1 tablet by mouth  every evening    spironolactone (ALDACTONE) 25 MG tablet Take 1 tablet by mouth once a day    TRUETEST TEST STRIPS Strp 1 strip by Misc.(Non-Drug; Combo Route) route 4 (four) times daily.    montelukast (SINGULAIR) 10 mg tablet TAKE 1 TABLET(10 MG) BY MOUTH EVERY EVENING           Clinical Reference Information           Your Vitals Were     BP Pulse Height Weight BMI    122/66 75 5' 5" (1.651 m) 138.8 kg (306 lb) 50.92 kg/m2      Blood Pressure          Most Recent Value    BP  122/66      Allergies as of 2/9/2017     Iodinated Contrast Media - Iv Dye      Immunizations Administered on Date of Encounter - 2/9/2017     None      Language Assistance Services     ATTENTION: Language assistance services are available, free of charge. Please call [phone].      ATENCIÓN: Si sneha dominguez, tiene a mckinnon disposición servicios gratuitos de asistencia lingüística. Llame al [phone].     JONAS Ý: N?u b?n nói Ti?ng Vi?t, có các d?ch v? h? tr? ngôn ng? mi?n phí dành cho b?n. G?i s? [phone].         [facility] complies with applicable Federal civil rights laws and does not discriminate on the basis of race, color, national origin, age, disability, or sex.        "

## 2017-02-16 ENCOUNTER — TELEPHONE (OUTPATIENT)
Dept: PHYSICAL MEDICINE AND REHAB | Facility: CLINIC | Age: 67
End: 2017-02-16

## 2017-02-16 ENCOUNTER — OFFICE VISIT (OUTPATIENT)
Dept: PHYSICAL MEDICINE AND REHAB | Facility: CLINIC | Age: 67
End: 2017-02-16
Payer: MEDICARE

## 2017-02-16 ENCOUNTER — OFFICE VISIT (OUTPATIENT)
Dept: PODIATRY | Facility: CLINIC | Age: 67
End: 2017-02-16
Payer: MEDICARE

## 2017-02-16 VITALS
WEIGHT: 293 LBS | HEIGHT: 65 IN | HEART RATE: 80 BPM | DIASTOLIC BLOOD PRESSURE: 71 MMHG | SYSTOLIC BLOOD PRESSURE: 148 MMHG | BODY MASS INDEX: 48.82 KG/M2

## 2017-02-16 VITALS
HEART RATE: 71 BPM | SYSTOLIC BLOOD PRESSURE: 128 MMHG | HEIGHT: 65 IN | BODY MASS INDEX: 48.82 KG/M2 | DIASTOLIC BLOOD PRESSURE: 64 MMHG | WEIGHT: 293 LBS

## 2017-02-16 DIAGNOSIS — M47.816 LUMBAR FACET ARTHROPATHY: ICD-10-CM

## 2017-02-16 DIAGNOSIS — M79.672 LEFT FOOT PAIN: ICD-10-CM

## 2017-02-16 DIAGNOSIS — M48.061 LUMBAR SPINAL STENOSIS: ICD-10-CM

## 2017-02-16 DIAGNOSIS — G89.29 CHRONIC LOW BACK PAIN WITH SCIATICA, SCIATICA LATERALITY UNSPECIFIED, UNSPECIFIED BACK PAIN LATERALITY: Primary | ICD-10-CM

## 2017-02-16 DIAGNOSIS — M20.12 HALLUX ABDUCTO VALGUS, LEFT: ICD-10-CM

## 2017-02-16 DIAGNOSIS — E11.43 DIABETIC AUTONOMIC NEUROPATHY ASSOCIATED WITH TYPE 2 DIABETES MELLITUS: ICD-10-CM

## 2017-02-16 DIAGNOSIS — H26.493 PCO (POSTERIOR CAPSULAR OPACIFICATION), BILATERAL: ICD-10-CM

## 2017-02-16 DIAGNOSIS — L84 CORN OR CALLUS: ICD-10-CM

## 2017-02-16 DIAGNOSIS — B35.1 ONYCHOMYCOSIS DUE TO DERMATOPHYTE: ICD-10-CM

## 2017-02-16 DIAGNOSIS — E11.49 TYPE II DIABETES MELLITUS WITH NEUROLOGICAL MANIFESTATIONS: Primary | ICD-10-CM

## 2017-02-16 DIAGNOSIS — M25.561 ACUTE PAIN OF RIGHT KNEE: ICD-10-CM

## 2017-02-16 DIAGNOSIS — M54.40 CHRONIC LOW BACK PAIN WITH SCIATICA, SCIATICA LATERALITY UNSPECIFIED, UNSPECIFIED BACK PAIN LATERALITY: Primary | ICD-10-CM

## 2017-02-16 PROCEDURE — 99999 PR PBB SHADOW E&M-EST. PATIENT-LVL IV: CPT | Mod: PBBFAC,,, | Performed by: PHYSICAL MEDICINE & REHABILITATION

## 2017-02-16 PROCEDURE — 11721 DEBRIDE NAIL 6 OR MORE: CPT | Mod: 59,Q9,S$PBB, | Performed by: PODIATRIST

## 2017-02-16 PROCEDURE — 99999 PR PBB SHADOW E&M-EST. PATIENT-LVL III: CPT | Mod: PBBFAC,,, | Performed by: PODIATRIST

## 2017-02-16 PROCEDURE — 99214 OFFICE O/P EST MOD 30 MIN: CPT | Mod: S$PBB,,, | Performed by: PHYSICAL MEDICINE & REHABILITATION

## 2017-02-16 PROCEDURE — 99214 OFFICE O/P EST MOD 30 MIN: CPT | Mod: PBBFAC,25,27 | Performed by: PHYSICAL MEDICINE & REHABILITATION

## 2017-02-16 PROCEDURE — 11055 PARING/CUTG B9 HYPRKER LES 1: CPT | Mod: S$PBB,Q9,, | Performed by: PODIATRIST

## 2017-02-16 PROCEDURE — 99214 OFFICE O/P EST MOD 30 MIN: CPT | Mod: 25,S$PBB,, | Performed by: PODIATRIST

## 2017-02-16 RX ORDER — HYDROCODONE BITARTRATE AND ACETAMINOPHEN 10; 325 MG/1; MG/1
1 TABLET ORAL EVERY 8 HOURS PRN
Qty: 90 TABLET | Refills: 0 | Status: SHIPPED | OUTPATIENT
Start: 2017-04-16 | End: 2017-05-04 | Stop reason: SDUPTHER

## 2017-02-16 RX ORDER — DULOXETIN HYDROCHLORIDE 30 MG/1
30 CAPSULE, DELAYED RELEASE ORAL DAILY
Qty: 90 CAPSULE | Refills: 3 | Status: SHIPPED | OUTPATIENT
Start: 2017-02-16 | End: 2017-05-04 | Stop reason: SDUPTHER

## 2017-02-16 RX ORDER — PREGABALIN 100 MG/1
100 CAPSULE ORAL 2 TIMES DAILY
Qty: 60 CAPSULE | Refills: 5 | Status: SHIPPED | OUTPATIENT
Start: 2017-02-16 | End: 2017-05-04 | Stop reason: SDUPTHER

## 2017-02-16 RX ORDER — HYDROCODONE BITARTRATE AND ACETAMINOPHEN 10; 325 MG/1; MG/1
1 TABLET ORAL EVERY 8 HOURS PRN
Qty: 90 TABLET | Refills: 0 | Status: SHIPPED | OUTPATIENT
Start: 2017-02-16 | End: 2017-03-18

## 2017-02-16 RX ORDER — HYDROCODONE BITARTRATE AND ACETAMINOPHEN 10; 325 MG/1; MG/1
1 TABLET ORAL EVERY 8 HOURS PRN
Qty: 90 TABLET | Refills: 0 | Status: SHIPPED | OUTPATIENT
Start: 2017-03-16 | End: 2017-03-28

## 2017-02-16 NOTE — PROGRESS NOTES
"Subjective:       Patient ID: Annika Mac is a 66 y.o. female.    Chief Complaint: Back Pain    Back Pain   Pertinent negatives include no abdominal pain, chest pain, fever, numbness or weakness. Headaches:     Knee Pain    Pertinent negatives include no numbness.   Patient is 65 y/o female , who returns to clinic for chronic back pain.   LCV 11/11/16..  She had a second B/l L5/S1 TF NAYLA on 08/18/16, and she rpeorts that helped a lot > 80% and lasted for 2 weeks.   First  B/l L5/S1 TF NAYLA 5/11/16, with  > 50% pain improvment   Since LCV, on 11/01/16,  she fell and injured her RT knee, '  Today complains about back pain, leg pain, and Rt knee pain.   Current knee pain is 8,wprst pan is 10/10.  Current back pain is  8,  worst pain are 10.   Pain  Is present at all time, accross lower back in midline of tail bone.    Back pain is more dull pain, and leg pain is more shooting pain art the top of thighs,   and bellow the knee.   Pain is present daytime, and radiates to both legs on front of legs.  Cannot walk < 50 ft, stands straight < 5 minutes.   She has to lean forward even with cane or during walking.   Back pain increases, and leg become weak.   She has diabetic neuropathy in both feet, top and bottom, tingling pain.  Does not have "charilie horses".   Sitting down helps a little bit, and lying down  ( on side, or stomach).   She takes Neurontin 300 mg  for maybe 6 yrs, and takes 3 pills a day, that does not help much.   She was taking Lyrica, and that helped better, but had no insurance coverage.   On LCV , Gabapentin is changed to  Lyrica  100 mg BID ( approved by insurance , and pain in hands is gone),  She has been getting NAYLA since 2011, by Dr. Quinn and Geronimo with NAYLA, with some pain relief.   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   She has SC, manual WC, and RW .  Here for follow up, re evaluation and treatment.     Review of Systems   Constitutional: Negative.  Negative for appetite change, chills, fatigue, fever and unexpected weight change.   HENT: Negative.  Negative for drooling, trouble swallowing and voice change.    Eyes: Negative.  Negative for pain and visual disturbance.   Respiratory: Negative.  Negative for shortness of breath and wheezing.    Cardiovascular: Negative.  Negative for chest pain and palpitations.   Gastrointestinal: Negative.  Negative for abdominal distention, abdominal pain, constipation and diarrhea.   Genitourinary: Negative.  Negative for difficulty urinating.   Musculoskeletal: Positive for back pain. Negative for arthralgias, gait problem, joint swelling, myalgias and neck stiffness.               Skin: Negative.  Negative for color change and rash.   Neurological: Negative.  Negative for dizziness, facial asymmetry, speech difficulty, weakness, light-headedness and numbness. Headaches:     Hematological: Negative for adenopathy.   Psychiatric/Behavioral: Negative.  Negative for behavioral problems, confusion and sleep disturbance. The patient is not nervous/anxious.            Objective:      Physical Exam      GENERAL: The patient is alert, oriented, pleasant.   HEENT; PERRLA  NECK: supple   MUSCULOSKELETAL:   Gait - slow james , on wide basis, using RW.   Cervical spine :  Minimally decreased AROM in cervical spine, flexion to 60, extension to  0,,   side bending and rotation  to 30-35 degrees without  Pain ,   No paravertebral cervical musculature tenderness    No  tenderness in upper trapezius muscles    Lumbar spine, full range of motion in all planes, flexion to 90 degrees , ext. 0.  Side bending and rotation to 35-40 degrees.   Straight leg raising Negative bilaterally.   Full range of motion in all joints x4 extremities, except in RT knee, that is very painful at pattellar lower  pole/border.  Appears high riding patella, hat is maybe  lateray displaced,  Muscle strength 5/5 throughout x4 extremities.   No  joint laxity throughout x4 extremities.   NEUROLOGIC: Cranial nerves II through XII intact.   Deep tendon reflexes is normal, +2 in the upper and lower extremities bilaterally.   Muscle tone is normal.   Sensory is intact to light touch and pinprick throughout x4 extremities.         MRI of Lumbar spine showed:  T12-L1:  There is no focal disc herniation.  No significant spinal canal narrowing.    No significant neural foraminal narrowing.  L1-2:  There is no focal disc herniation.  No significant spinal canal narrowing.    No significant neural foraminal narrowing.  L2-3:  There is no focal disc herniation.  No significant spinal canal narrowing.    No significant neural foraminal narrowing.  L3-4:  There is a minimal circumferential disk bulge and mild bilateral facet arthropathy which results in no significant spinal canal or neural foraminal narrowing.  L4-5:    There is circumferential disk bulge (eccentric to the left), moderate bilateral hypertrophic facet arthropathy, and ligamentum flavum thickening which results in mild spinal canal narrowing and no significant neural foraminal narrowing.      L5-S1:  There is circumferential disk bulge and severe hypertrophic facet arthropathy   with ligamentum flavum hypertrophy which results in mild spinal canal narrowing, moderate left neural foraminal narrowing, and mild right neural foraminal narrowing.i  Impression:   multilevel degenerative changes of the lumbar spine consisting of circumferential disk bulges, hypertrophic facet arthropathy, and ligamentum flavum hypertrophy   which result in mild spinal canal narrowing at L4-5  and L5-S1 as well as moderate left and mild right neural foraminal narrowing at L5-S1.      Assessment:       1. Chronic low back pain with sciatica, sciatica laterality unspecified, unspecified back pain  laterality    2. Acute pain of right knee    3. Lumbar spinal stenosis    4. Lumbar facet arthropathy      Plan:       Chronic low back pain with sciatica, sciatica laterality unspecified, unspecified back pain laterality  -     pregabalin (LYRICA) 100 MG capsule; Take 1 capsule (100 mg total) by mouth 2 (two) times daily.  Dispense: 60 capsule; Refill: 5  -     duloxetine (CYMBALTA) 30 MG capsule; Take 1 capsule (30 mg total) by mouth once daily.  Dispense: 90 capsule; Refill: 3  -     hydrocodone-acetaminophen 10-325mg (NORCO)  mg Tab; Take 1 tablet by mouth every 8 (eight) hours as needed for Pain.  Dispense: 90 tablet; Refill: 0  -     hydrocodone-acetaminophen 10-325mg (NORCO)  mg Tab; Take 1 tablet by mouth every 8 (eight) hours as needed for Pain.  Dispense: 90 tablet; Refill: 0  -     hydrocodone-acetaminophen 10-325mg (NORCO)  mg Tab; Take 1 tablet by mouth every 8 (eight) hours as needed for Pain.  Dispense: 90 tablet; Refill: 0    Acute pain of right knee  -     pregabalin (LYRICA) 100 MG capsule; Take 1 capsule (100 mg total) by mouth 2 (two) times daily.  Dispense: 60 capsule; Refill: 5  -     duloxetine (CYMBALTA) 30 MG capsule; Take 1 capsule (30 mg total) by mouth once daily.  Dispense: 90 capsule; Refill: 3  -     hydrocodone-acetaminophen 10-325mg (NORCO)  mg Tab; Take 1 tablet by mouth every 8 (eight) hours as needed for Pain.  Dispense: 90 tablet; Refill: 0  -     hydrocodone-acetaminophen 10-325mg (NORCO)  mg Tab; Take 1 tablet by mouth every 8 (eight) hours as needed for Pain.  Dispense: 90 tablet; Refill: 0  -     hydrocodone-acetaminophen 10-325mg (NORCO)  mg Tab; Take 1 tablet by mouth every 8 (eight) hours as needed for Pain.  Dispense: 90 tablet; Refill: 0    Lumbar spinal stenosis  -     pregabalin (LYRICA) 100 MG capsule; Take 1 capsule (100 mg total) by mouth 2 (two) times daily.  Dispense: 60 capsule; Refill: 5  -     duloxetine (CYMBALTA) 30 MG  capsule; Take 1 capsule (30 mg total) by mouth once daily.  Dispense: 90 capsule; Refill: 3  -     hydrocodone-acetaminophen 10-325mg (NORCO)  mg Tab; Take 1 tablet by mouth every 8 (eight) hours as needed for Pain.  Dispense: 90 tablet; Refill: 0  -     hydrocodone-acetaminophen 10-325mg (NORCO)  mg Tab; Take 1 tablet by mouth every 8 (eight) hours as needed for Pain.  Dispense: 90 tablet; Refill: 0  -     hydrocodone-acetaminophen 10-325mg (NORCO)  mg Tab; Take 1 tablet by mouth every 8 (eight) hours as needed for Pain.  Dispense: 90 tablet; Refill: 0    Lumbar facet arthropathy  -     pregabalin (LYRICA) 100 MG capsule; Take 1 capsule (100 mg total) by mouth 2 (two) times daily.  Dispense: 60 capsule; Refill: 5  -     duloxetine (CYMBALTA) 30 MG capsule; Take 1 capsule (30 mg total) by mouth once daily.  Dispense: 90 capsule; Refill: 3  -     hydrocodone-acetaminophen 10-325mg (NORCO)  mg Tab; Take 1 tablet by mouth every 8 (eight) hours as needed for Pain.  Dispense: 90 tablet; Refill: 0  -     hydrocodone-acetaminophen 10-325mg (NORCO)  mg Tab; Take 1 tablet by mouth every 8 (eight) hours as needed for Pain.  Dispense: 90 tablet; Refill: 0  -     hydrocodone-acetaminophen 10-325mg (NORCO)  mg Tab; Take 1 tablet by mouth every 8 (eight) hours as needed for Pain.  Dispense: 90 tablet; Refill: 0    PCO (posterior capsular opacification), bilateral  -     pregabalin (LYRICA) 100 MG capsule; Take 1 capsule (100 mg total) by mouth 2 (two) times daily.  Dispense: 60 capsule; Refill: 5  -     duloxetine (CYMBALTA) 30 MG capsule; Take 1 capsule (30 mg total) by mouth once daily.  Dispense: 90 capsule; Refill: 3  -     hydrocodone-acetaminophen 10-325mg (NORCO)  mg Tab; Take 1 tablet by mouth every 8 (eight) hours as needed for Pain.  Dispense: 90 tablet; Refill: 0  -     hydrocodone-acetaminophen 10-325mg (NORCO)  mg Tab; Take 1 tablet by mouth every 8 (eight) hours as  needed for Pain.  Dispense: 90 tablet; Refill: 0  -     hydrocodone-acetaminophen 10-325mg (NORCO)  mg Tab; Take 1 tablet by mouth every 8 (eight) hours as needed for Pain.  Dispense: 90 tablet; Refill: 0    Diabetic autonomic neuropathy associated with type 2 diabetes mellitus  -     pregabalin (LYRICA) 100 MG capsule; Take 1 capsule (100 mg total) by mouth 2 (two) times daily.  Dispense: 60 capsule; Refill: 5  -     duloxetine (CYMBALTA) 30 MG capsule; Take 1 capsule (30 mg total) by mouth once daily.  Dispense: 90 capsule; Refill: 3  -     hydrocodone-acetaminophen 10-325mg (NORCO)  mg Tab; Take 1 tablet by mouth every 8 (eight) hours as needed for Pain.  Dispense: 90 tablet; Refill: 0  -     hydrocodone-acetaminophen 10-325mg (NORCO)  mg Tab; Take 1 tablet by mouth every 8 (eight) hours as needed for Pain.  Dispense: 90 tablet; Refill: 0  -     hydrocodone-acetaminophen 10-325mg (NORCO)  mg Tab; Take 1 tablet by mouth every 8 (eight) hours as needed for Pain.  Dispense: 90 tablet; Refill: 0      Patient with chronic low back pain, secondary to lumbar spondylosis, possible radiculopathy, cx with morbid obesity.   Also with gait instability sec. To sever DJD of Rt knee.    1. Will increase  Hydrocodone to 10/325 mg , and Gabapentin is changed to  Lyrica  100 mg BID ( approved by insurance , and pain in hands is gone),   Add Cymbalta.  2. Will resume  PT, external referral closer to home.  3. Rt knee pain , xray of knee showed severe DJD.  Knee brace for stability,and proprioception.  Seen by Betsey Paige PA ,and dr. [facility], who recommended no surgery in this high risk pt, weight loss in recommended before re evaluation.      RTC in 3 months.     Total time spent face to face with patient was 25 minutes.   More than 50% of that time was spent in counseling on diagnosis , prognosis and treatment options.   I also caunsel patient  on common and most usual side effect of prescribed  medications.   Risk and benefits of opiates, possible risk of developing opiate dependence and tolerance, need of strict compliance with prescribed medications.  I reviewed Primary care , and other specialty's notes to better coordinate patient's  care.   All questions were answered, and patient voiced understanding.                                                                                                                                                                          .

## 2017-02-16 NOTE — MR AVS SNAPSHOT
[facility] - Podiatry  [address]  [facility] [address]  Phone: [phone]                  Annika Mac   2017 10:00 AM   Office Visit    Description:  Female : 1950   Provider:  Paulino Galicia DPM   Department:  [facility]           Reason for Visit     Diabetic Foot Exam     Nail Care     Foot Swelling           Diagnoses this Visit        Comments    Type II diabetes mellitus with neurological manifestations    -  Primary     Onychomycosis due to dermatophyte         Corn or callus         Left foot pain         Hallux abducto valgus, left                To Do List           Future Appointments        Provider Department Dept Phone    2017 1:40 PM MD Miguel Coello [facility]-Physical Med & Rehab [phone]    3/21/2017 9:00 AM [facility] [phone]    3/28/2017 9:00 AM LOUISA Castro [facility] - Endocrinology [phone]    3/28/2017 11:00 AM DIABETES EDUCATOR, INT MED 1 [facility] - IM Diabetes Program [phone]    2017 11:00 AM Blair Rizo MD [facility] - Vasc Diseases [phone]      Goals (5 Years of Data)              16    HEMOGLOBIN A1C < 7.5   9.1  10.8  8.6      Follow-Up and Disposition     Return in about 3 months (around 2017).      [facility]     [facility] -  Assistance  Registered nurses in the [facility] provide clinical advisement, health education, appointment booking, and other advisory services.  Call for this free service at [phone].             Medications           Message regarding Medications     Verify the changes and/or additions to your medication regime listed below are the same as discussed with your clinician today.  If any of these changes or additions are incorrect, please notify your healthcare provider.             Verify that the below list of medications is an accurate  "representation of the medications you are currently taking.  If none reported, the list may be blank. If incorrect, please contact your healthcare provider. Carry this list with you in case of emergency.           Current Medications     albuterol 90 mcg/actuation inhaler Inhale 2 puffs into the lungs every 6 (six) hours as needed for Wheezing.    allopurinol (ZYLOPRIM) 100 MG tablet Take 1 tablet by mouth once daily    aspirin 81 MG Chew Take 1 tablet (81 mg total) by mouth once daily.    azelastine (ASTELIN) 137 mcg (0.1 %) nasal spray 1 spray (137 mcg total) by Nasal route 2 (two) times daily.    BD INSULIN SYRINGE ULTRA-FINE 0.3 mL 30 gauge x 1/2" Syrg USE THREE TIMES DAILY FOR INSULIN INJECTIONS    blood-glucose meter kit Use as instructed, true test/result meter    colchicine 0.6 mg tablet Take 1 tablet (0.6 mg total) by mouth once daily.    duloxetine (CYMBALTA) 30 MG capsule Take 1 capsule (30 mg total) by mouth once daily.    fexofenadine (ALLEGRA) 180 MG tablet Take 1 tablet (180 mg total) by mouth once daily.    fluticasone (FLONASE) 50 mcg/actuation nasal spray 2 sprays by Each Nare route once daily.    furosemide (LASIX) 20 MG tablet Take 1 tablet by mouth once a day    INSULIN REGULAR, HUMAN (INSULIN REGULAR HUM U-500 CONC SUBQ) Inject into the skin. 17 unit trina with meals     irbesartan (AVAPRO) 300 MG tablet Take 1 tablet (300 mg total) by mouth every evening.    labetalol (NORMODYNE) 300 MG tablet Take 1 tablet by mouth two  times daily    lancets 31 gauge Misc 1 lancet by Misc.(Non-Drug; Combo Route) route 4 (four) times daily.    levocetirizine (XYZAL) 5 MG tablet Take 1 tablet (5 mg total) by mouth every evening.    montelukast (SINGULAIR) 10 mg tablet TAKE 1 TABLET(10 MG) BY MOUTH EVERY EVENING    pantoprazole (PROTONIX) 40 MG tablet Take 1 tablet (40 mg total) by mouth once daily.    pen needle, diabetic 32 gauge x 1/5" Ndle 1 application by Misc.(Non-Drug; Combo Route) route 2 (two) times " "daily.    pen needle, diabetic 32 gauge x 1/6" Ndle 1 application by Misc.(Non-Drug; Combo Route) route 2 (two) times daily.    pregabalin (LYRICA) 100 MG capsule Take 1 capsule (100 mg total) by mouth 2 (two) times daily.    simvastatin (ZOCOR) 40 MG tablet Take 1 tablet by mouth  every evening    spironolactone (ALDACTONE) 25 MG tablet Take 1 tablet by mouth once a day    TRUETEST TEST STRIPS Strp 1 strip by Misc.(Non-Drug; Combo Route) route 4 (four) times daily.           Clinical Reference Information           Your Vitals Were     BP Pulse Height Weight BMI    148/71 80 5' 5" (1.651 m) 138.8 kg (306 lb) 50.92 kg/m2      Blood Pressure          Most Recent Value    BP  (!)  148/71      Allergies as of 2/16/2017     Iodinated Contrast Media - Iv Dye      Immunizations Administered on Date of Encounter - 2/16/2017     None      Instructions    Rest and ice the Lt. Great toe joint up to 20 minutes daily.    Recommend applying aspercream to the joint up to 4 times daily.    Continue wearing shoes with a wider toe box to allow the joint pain to subside.       Language Assistance Services     ATTENTION: Language assistance services are available, free of charge. Please call [phone].      ATENCIÓN: Si sneha dominguez, tiene a mckinnon disposición servicios gratuitos de asistencia lingüística. Llame al [phone].     JONAS Ý: N?u b?n nói Ti?ng Vi?t, có các d?ch v? h? tr? ngôn ng? mi?n phí dành cho b?n. G?i s? [phone].         [facility] complies with applicable Federal civil rights laws and does not discriminate on the basis of race, color, national origin, age, disability, or sex.        "

## 2017-02-16 NOTE — MR AVS SNAPSHOT
[facility]  [address]  [facility] [address]  Phone: [phone]                  Annika Mac   2017 1:40 PM   Office Visit    Description:  Female : 1950   Provider:  Miceala Barrientos MD   Department:  [facility]           Reason for Visit     Back Pain           Diagnoses this Visit        Comments    Chronic low back pain with sciatica, sciatica laterality unspecified, unspecified back pain laterality    -  Primary     Acute pain of right knee         Lumbar spinal stenosis         Lumbar facet arthropathy         PCO (posterior capsular opacification), bilateral         Diabetic autonomic neuropathy associated with type 2 diabetes mellitus                To Do List           Future Appointments        Provider Department Dept Phone    3/21/2017 9:00 AM [place] [facility] [facility] [facility] [place] [phone]    3/28/2017 9:00 AM LOUISA Castro [facility] - Endocrinology [phone]    3/28/2017 11:00 AM DIABETES EDUCATOR, INT MED 1 [facility] - IM Diabetes Program [phone]    2017 11:00 AM MD Miguel Frias [facility] - Vasc Diseases [phone]    2017 10:00 AM Enid Hines DPM [facility] Pod[facility]ry [phone]      Goals (5 Years of Data)              16    HEMOGLOBIN A1C < 7.5   9.1  10.8  8.6       These Medications        Disp Refills Start End    pregabalin (LYRICA) 100 MG capsule 60 capsule 5 2017 3/18/2017    Take 1 capsule (100 mg total) by mouth 2 (two) times daily. - Oral    Pharmacy: [facility] [address] [place] [address] 308 AT [facility] [place] [address] Ph #: [phone]       duloxetine (CYMBALTA) 30 MG capsule 90 capsule 3 2017    Take 1 capsule (30 mg total) by mouth once daily. - Oral    Pharmacy: [facility] [address] [place] [address] AT [facility] [place] [address] [place] [address] Ph #:  [phone]       hydrocodone-acetaminophen 10-325mg (NORCO)  mg Tab 90 tablet 0 2/16/2017 3/18/2017    Take 1 tablet by mouth every 8 (eight) hours as needed for Pain. - Oral    Pharmacy: [facility] [address] [place] [address] [facility] Ph #: [phone]       hydrocodone-acetaminophen 10-325mg (NORCO)  mg Tab 90 tablet 0 3/16/2017 4/15/2017    Take 1 tablet by mouth every 8 (eight) hours as needed for Pain. - Oral    Pharmacy: [facility] [address] [place] [address] [facility] Ph #: [phone]       hydrocodone-acetaminophen 10-325mg (NORCO)  mg Tab 90 tablet 0 4/16/2017 5/16/2017    Take 1 tablet by mouth every 8 (eight) hours as needed for Pain. - Oral    Pharmacy: [facility] [place] [address] [facility] Ph #: [phone]         [facility]     [facility] - 24/7 Assistance  Registered nurses in the [facility] provide clinical advisement, health education, appointment booking, and other advisory services.  Call for this free service at [phone].             Medications           Message regarding Medications     Verify the changes and/or additions to your medication regime listed below are the same as discussed with your clinician today.  If any of these changes or additions are incorrect, please notify your healthcare provider.        START taking these NEW medications        Refills    hydrocodone-acetaminophen 10-325mg (NORCO)  mg Tab 0    Sig: Take 1 tablet by mouth every 8 (eight) hours as needed for Pain.    Class: Print    Route: Oral    hydrocodone-acetaminophen 10-325mg (NORCO)  mg Tab 0    Starting on: 3/16/2017    Sig: Take 1 tablet by mouth every 8 (eight) hours as needed for Pain.    Class: Print    Route: Oral    hydrocodone-acetaminophen 10-325mg (NORCO)  mg  "Tab 0    Starting on: 4/16/2017    Sig: Take 1 tablet by mouth every 8 (eight) hours as needed for Pain.    Class: Print    Route: Oral      STOP taking these medications     hydrocodone-acetaminophen 5-325mg (NORCO) 5-325 mg per tablet Take 1 tablet by mouth every 6 (six) hours as needed for Pain.    hydrocodone-acetaminophen 7.5-325mg (NORCO) 7.5-325 mg per tablet Take 1 tablet by mouth every 6 (six) hours as needed for Pain.    hydrocodone-acetaminophen 7.5-325mg (NORCO) 7.5-325 mg per tablet Take 1 tablet by mouth every 6 (six) hours as needed for Pain.           Verify that the below list of medications is an accurate representation of the medications you are currently taking.  If none reported, the list may be blank. If incorrect, please contact your healthcare provider. Carry this list with you in case of emergency.           Current Medications     albuterol 90 mcg/actuation inhaler Inhale 2 puffs into the lungs every 6 (six) hours as needed for Wheezing.    allopurinol (ZYLOPRIM) 100 MG tablet Take 1 tablet by mouth once daily    aspirin 81 MG Chew Take 1 tablet (81 mg total) by mouth once daily.    azelastine (ASTELIN) 137 mcg (0.1 %) nasal spray 1 spray (137 mcg total) by Nasal route 2 (two) times daily.    BD INSULIN SYRINGE ULTRA-FINE 0.3 mL 30 gauge x 1/2" Syrg USE THREE TIMES DAILY FOR INSULIN INJECTIONS    blood-glucose meter kit Use as instructed, true test/result meter    colchicine 0.6 mg tablet Take 1 tablet (0.6 mg total) by mouth once daily.    duloxetine (CYMBALTA) 30 MG capsule Take 1 capsule (30 mg total) by mouth once daily.    fexofenadine (ALLEGRA) 180 MG tablet Take 1 tablet (180 mg total) by mouth once daily.    fluticasone (FLONASE) 50 mcg/actuation nasal spray 2 sprays by Each Nare route once daily.    furosemide (LASIX) 20 MG tablet Take 1 tablet by mouth once a day    hydrocodone-acetaminophen 10-325mg (NORCO)  mg Tab Take 1 tablet by mouth every 8 (eight) hours as needed for " "Pain.    hydrocodone-acetaminophen 10-325mg (NORCO)  mg Tab Starting on Mar 16, 2017. Take 1 tablet by mouth every 8 (eight) hours as needed for Pain.    hydrocodone-acetaminophen 10-325mg (NORCO)  mg Tab Starting on Apr 16, 2017. Take 1 tablet by mouth every 8 (eight) hours as needed for Pain.    INSULIN REGULAR, HUMAN (INSULIN REGULAR HUM U-500 CONC SUBQ) Inject into the skin. 17 unit trina with meals     irbesartan (AVAPRO) 300 MG tablet Take 1 tablet (300 mg total) by mouth every evening.    labetalol (NORMODYNE) 300 MG tablet Take 1 tablet by mouth two  times daily    lancets 31 gauge Misc 1 lancet by Misc.(Non-Drug; Combo Route) route 4 (four) times daily.    levocetirizine (XYZAL) 5 MG tablet Take 1 tablet (5 mg total) by mouth every evening.    montelukast (SINGULAIR) 10 mg tablet TAKE 1 TABLET(10 MG) BY MOUTH EVERY EVENING    pantoprazole (PROTONIX) 40 MG tablet Take 1 tablet (40 mg total) by mouth once daily.    pen needle, diabetic 32 gauge x 1/5" Ndle 1 application by Misc.(Non-Drug; Combo Route) route 2 (two) times daily.    pen needle, diabetic 32 gauge x 1/6" Ndle 1 application by Misc.(Non-Drug; Combo Route) route 2 (two) times daily.    pregabalin (LYRICA) 100 MG capsule Take 1 capsule (100 mg total) by mouth 2 (two) times daily.    simvastatin (ZOCOR) 40 MG tablet Take 1 tablet by mouth  every evening    spironolactone (ALDACTONE) 25 MG tablet Take 1 tablet by mouth once a day    TRUETEST TEST STRIPS Strp 1 strip by Misc.(Non-Drug; Combo Route) route 4 (four) times daily.           Clinical Reference Information           Your Vitals Were     BP Pulse Height Weight BMI    128/64 71 5' 5" (1.651 m) 139.7 kg (307 lb 14 oz) 51.23 kg/m2      Blood Pressure          Most Recent Value    BP  128/64      Allergies as of 2/16/2017     Iodinated Contrast Media - Iv Dye      Immunizations Administered on Date of Encounter - 2/16/2017     None      Language Assistance Services     ATTENTION: " Language assistance services are available, free of charge. Please call [phone].      ATENCIÓN: Si habla mychalañol, tiene a mckinnon disposición servicios gratuitos de asistencia lingüística. Llame al [phone].     CHÚ Ý: N?u b?n nói Ti?ng Vi?t, có các d?ch v? h? tr? ngôn ng? mi?n phí dành cho b?n. G?i s? [phone].         [facility] complies with applicable Federal civil rights laws and does not discriminate on the basis of race, color, national origin, age, disability, or sex.

## 2017-02-16 NOTE — PROGRESS NOTES
Subjective:      Patient ID: Annika Mac is a 66 y.o. female.    Chief Complaint: Diabetic Foot Exam; Nail Care; and Foot Swelling (lt foot)    Annika is a 66 y.o. female who presents to the clinic for evaluation and treatment of diabetic feet. Annika has a past medical history of Allergy; Anemia (7/27/2012); Arthritis; CHF (congestive heart failure); CKD (chronic kidney disease) stage 3, GFR 30-59 ml/min (7/27/2012); Clotting disorder; Deep vein thrombophlebitis of left leg (7/27/2012); Degenerative disc disease; Diabetic peripheral neuropathy associated with type 2 diabetes mellitus (7/27/2012); GERD (gastroesophageal reflux disease); HTN (hypertension) (7/27/2012); Hyperlipidemia (7/27/2012); Lead-induced gout of ankle or foot; Nonproliferative diabetic retinopathy of right eye (7/27/2012); Obstructive sleep apnea (7/27/2012); Osteoporosis; Proliferative diabetic retinopathy of left eye (7/27/2012); Stroke (8/1/2003); Type 2 diabetes mellitus with diabetic polyneuropathy (7/27/2012); and Ulcer. Patient relates having a gout attack of the Lt. Foot over 4 weeks ago.  States the entire dorsum of the affected foot became red, hot, and swollen.  States the foot was tender to light touch and with weight bearing.  Relates going to the ED where she was treated for a possible bout of cellulitis.  States symptoms have finally resolved, however, notes mild tenderness to the Lt. 1st mtp joint.  States the joint is tender with pressure from shoe gear only.  Currently rates pain symptoms as a 0/10.  States symptoms are alleviated with wearing Nike Air Max tennis shoes with a wider toe box.  Denies any additional pedal complaints.      PCP: Cody Villatoro MD    Date Last Seen by PCP: 2/3/17    Current shoe gear: Tennis shoes    Hemoglobin A1C   Date Value Ref Range Status   11/25/2016 9.1 (H) 4.5 - 6.2 % Final     Comment:     According to ADA guidelines, hemoglobin A1C <7.0% represents  optimal control in non-pregnant diabetic  patients.  Different  metrics may apply to specific populations.   Standards of Medical Care in Diabetes - 2016.  For the purpose of screening for the presence of diabetes:  <5.7%     Consistent with the absence of diabetes  5.7-6.4%  Consistent with increasing risk for diabetes   (prediabetes)  >or=6.5%  Consistent with diabetes  Currently no consensus exists for use of hemoglobin A1C  for diagnosis of diabetes for children.     08/25/2016 10.8 (H) 4.5 - 6.2 % Final     Comment:     According to ADA guidelines, hemoglobin A1C <7.0% represents  optimal control in non-pregnant diabetic patients.  Different  metrics may apply to specific populations.   Standards of Medical Care in Diabetes - 2016.  For the purpose of screening for the presence of diabetes:  <5.7%     Consistent with the absence of diabetes  5.7-6.4%  Consistent with increasing risk for diabetes   (prediabetes)  >or=6.5%  Consistent with diabetes  Currently no consensus exists for use of hemoglobin A1C  for diagnosis of diabetes for children.     04/29/2016 8.6 (H) 4.5 - 6.2 % Final         Past Medical History   Diagnosis Date    Allergy     Anemia 7/27/2012    Arthritis     CHF (congestive heart failure)     CKD (chronic kidney disease) stage 3, GFR 30-59 ml/min 7/27/2012    Clotting disorder     Deep vein thrombophlebitis of left leg 7/27/2012    Degenerative disc disease     Diabetic peripheral neuropathy associated with type 2 diabetes mellitus 7/27/2012    GERD (gastroesophageal reflux disease)     HTN (hypertension) 7/27/2012    Hyperlipidemia 7/27/2012    Lead-induced gout of ankle or foot      right going on three weeks    Nonproliferative diabetic retinopathy of right eye 7/27/2012    Obstructive sleep apnea 7/27/2012    Osteoporosis     Proliferative diabetic retinopathy of left eye 7/27/2012    Stroke 8/1/2003    Type 2 diabetes mellitus with diabetic polyneuropathy 7/27/2012    Ulcer        Past Surgical History  "  Procedure Laterality Date    Total knee arthroplasty  2006     left    Total abdominal hysterectomy  1982    Ganglion cyst excision  2007     Right wrist    Cyst removal  2011     left side of face    Trigger finger release  2009     section      Cataract extraction w/  intraocular lens implant Left 3/2002     left     Cataract extraction w/  intraocular lens implant Right      OD dr. olivares    Pan retinal photocoagulation Bilateral      Dr. Monterroso (Proliferative Diabetic Retinopathy)    Eye surgery Bilateral 2012     eye implants       Family History   Problem Relation Age of Onset    Diabetes Mother     Hypertension Mother     Heart disease Father     Diabetes Sister     Thyroid disease Brother     Amblyopia Neg Hx     Blindness Neg Hx     Glaucoma Neg Hx     Macular degeneration Neg Hx     Retinal detachment Neg Hx     Strabismus Neg Hx        Social History     Social History    Marital status:      Spouse name: Heber    Number of children: 3    Years of education: N/A     Social History Main Topics    Smoking status: Never Smoker    Smokeless tobacco: Never Used    Alcohol use No    Drug use: No    Sexual activity: Yes     Partners: Male     Other Topics Concern    None     Social History Narrative    , lives with family; 3 children, 2 grandchildren       Current Outpatient Prescriptions   Medication Sig Dispense Refill    albuterol 90 mcg/actuation inhaler Inhale 2 puffs into the lungs every 6 (six) hours as needed for Wheezing. 1 Inhaler 0    allopurinol (ZYLOPRIM) 100 MG tablet Take 1 tablet by mouth once daily 30 tablet 3    aspirin 81 MG Chew Take 1 tablet (81 mg total) by mouth once daily.  0    azelastine (ASTELIN) 137 mcg (0.1 %) nasal spray 1 spray (137 mcg total) by Nasal route 2 (two) times daily. 30 mL 11    BD INSULIN SYRINGE ULTRA-FINE 0.3 mL 30 gauge x 1/2" Syrg USE THREE TIMES DAILY FOR INSULIN " "INJECTIONS 300 each 2    blood-glucose meter kit Use as instructed, true test/result meter 1 each 0    colchicine 0.6 mg tablet Take 1 tablet (0.6 mg total) by mouth once daily. 30 tablet 3    fexofenadine (ALLEGRA) 180 MG tablet Take 1 tablet (180 mg total) by mouth once daily. 30 tablet 3    fluticasone (FLONASE) 50 mcg/actuation nasal spray 2 sprays by Each Nare route once daily. 48 g 6    furosemide (LASIX) 20 MG tablet Take 1 tablet by mouth once a day 90 tablet 3    INSULIN REGULAR, HUMAN (INSULIN REGULAR HUM U-500 CONC SUBQ) Inject into the skin. 17 unit trina with meals       irbesartan (AVAPRO) 300 MG tablet Take 1 tablet (300 mg total) by mouth every evening. 90 tablet 3    labetalol (NORMODYNE) 300 MG tablet Take 1 tablet by mouth two  times daily 120 tablet 3    lancets 31 gauge Misc 1 lancet by Misc.(Non-Drug; Combo Route) route 4 (four) times daily. 150 each 6    levocetirizine (XYZAL) 5 MG tablet Take 1 tablet (5 mg total) by mouth every evening. 7 tablet 0    montelukast (SINGULAIR) 10 mg tablet TAKE 1 TABLET(10 MG) BY MOUTH EVERY EVENING 90 tablet 0    pantoprazole (PROTONIX) 40 MG tablet Take 1 tablet (40 mg total) by mouth once daily. 30 tablet 6    pen needle, diabetic 32 gauge x 1/5" Ndle 1 application by Misc.(Non-Drug; Combo Route) route 2 (two) times daily. 100 each 11    pen needle, diabetic 32 gauge x 1/6" Ndle 1 application by Misc.(Non-Drug; Combo Route) route 2 (two) times daily. 100 each 11    simvastatin (ZOCOR) 40 MG tablet Take 1 tablet by mouth  every evening 90 tablet 4    spironolactone (ALDACTONE) 25 MG tablet Take 1 tablet by mouth once a day 90 tablet 3    TRUETEST TEST STRIPS Strp 1 strip by Misc.(Non-Drug; Combo Route) route 4 (four) times daily. 150 strip 6    duloxetine (CYMBALTA) 30 MG capsule Take 1 capsule (30 mg total) by mouth once daily. 90 capsule 3    hydrocodone-acetaminophen 10-325mg (NORCO)  mg Tab Take 1 tablet by mouth every 8 (eight) " hours as needed for Pain. 90 tablet 0    [START ON 3/16/2017] hydrocodone-acetaminophen 10-325mg (NORCO)  mg Tab Take 1 tablet by mouth every 8 (eight) hours as needed for Pain. 90 tablet 0    [START ON 4/16/2017] hydrocodone-acetaminophen 10-325mg (NORCO)  mg Tab Take 1 tablet by mouth every 8 (eight) hours as needed for Pain. 90 tablet 0    pregabalin (LYRICA) 100 MG capsule Take 1 capsule (100 mg total) by mouth 2 (two) times daily. 60 capsule 5     No current facility-administered medications for this visit.        Review of patient's allergies indicates:   Allergen Reactions    Iodinated contrast media - iv dye Rash         Review of Systems   Constitution: Negative for chills, decreased appetite and fever.   Cardiovascular: Negative for leg swelling.   Skin: Positive for dry skin and nail changes.   Musculoskeletal: Positive for arthritis. Negative for falls, joint swelling and myalgias.   Gastrointestinal: Negative for nausea and vomiting.   Neurological: Positive for numbness. Negative for loss of balance and paresthesias.           Objective:      Physical Exam   Constitutional: She is oriented to person, place, and time. She appears well-developed and well-nourished. No distress.   Cardiovascular:   Pulses:       Dorsalis pedis pulses are 2+ on the right side, and 2+ on the left side.        Posterior tibial pulses are 1+ on the right side, and 1+ on the left side.   CFT <3 seconds bilateral.  Pedal hair growth decreased bilateral.   No varicosities noted bilateral. Mild nonpitting edema noted to bilateral lower extremity.  Toes are cool to touch bilateral.         Musculoskeletal: Normal range of motion. She exhibits edema and tenderness.   Muscle strength 5/5 in all muscle groups bilateral.  No tenderness nor crepitation with ROM of foot/ankle joints bilateral.  Pain with palpation to the medial eminence of the Lt. 1st mtp joint.  (-) for limitation with active and passive ROM about the  affected joint.  Bilateral hallux abducto valgus.        Neurological: She is alert and oriented to person, place, and time. She has normal strength. A sensory deficit is present.   Protective sensation per [employer] monofilament decreased bilateral.    Vibratory sensation decreased bilateral.    Light touch absent bilateral.       Skin: Skin is warm, dry and intact. Lesion noted. No abrasion, no bruising, no burn, no laceration, no petechiae and no rash noted. She is not diaphoretic. No cyanosis or erythema. No pallor. Nails show no clubbing.   Pedal skin appears edematous bilateral.  Toenails x 10 appear thickened by 2 mm's, elongated by 4 mm's, and discolored with subungual debris.  Focal hyperkeratotic lesion noted to the distal tip of the Lt. 2nd toe. Examination of the skin reveals no evidence of significant maceration, rashes, open lesions, suspicious appearing nevi or other concerning lesions.      Psychiatric: She has a normal mood and affect. Thought content normal.   Nursing note and vitals reviewed.            Assessment:       Encounter Diagnoses   Name Primary?    Type II diabetes mellitus with neurological manifestations Yes    Onychomycosis due to dermatophyte     Corn or callus     Left foot pain     Hallux abducto valgus, left          Plan:       Annika was seen today for diabetic foot exam, nail care and foot swelling.    Diagnoses and all orders for this visit:    Type II diabetes mellitus with neurological manifestations    Onychomycosis due to dermatophyte    Corn or callus    Left foot pain    Hallux abducto valgus, left      I counseled the patient on her conditions, their implications and medical management.    Advised to continue wearing shoes with a wider toe box to accommodate digital deformities.    Advised to apply ice and a topical nsaid to the Lt. 1st mtp joint daily.    Shoe inspection. Diabetic Foot Education. Patient reminded of the importance of good nutrition and blood  sugar control to help prevent podiatric complications of diabetes. Patient instructed on proper foot hygeine. We discussed wearing proper shoe gear, daily foot inspections, never walking without protective shoe gear, never putting sharp instruments to feet    With patient's permission, nails were aggressively reduced and debrided x 10 to their soft tissue attachment mechanically and with electric , removing all offending nail and debris.  Also, a sterile #15 scalpel was used to trim lesions x 1 down to smooth appearing skin.  Patient relates relief following the procedure. She will continue to monitor the areas daily, inspect her feet, wear protective shoe gear when ambulatory, moisturizer to maintain skin integrity and follow in this office in approximately 2-3 months, sooner p.r.n.      Return in about 3 months (around 5/16/2017).    Paulino Galicia DPM

## 2017-02-16 NOTE — PATIENT INSTRUCTIONS
Rest and ice the Lt. Great toe joint up to 20 minutes daily.    Recommend applying aspercream to the joint up to 4 times daily.    Continue wearing shoes with a wider toe box to allow the joint pain to subside.

## 2017-02-21 DIAGNOSIS — T78.40XD ALLERGY, SUBSEQUENT ENCOUNTER: ICD-10-CM

## 2017-02-21 RX ORDER — MINERAL OIL
ENEMA (ML) RECTAL
Qty: 30 TABLET | Refills: 0 | Status: SHIPPED | OUTPATIENT
Start: 2017-02-21 | End: 2017-03-23 | Stop reason: SDUPTHER

## 2017-02-27 ENCOUNTER — OFFICE VISIT (OUTPATIENT)
Dept: URGENT CARE | Facility: CLINIC | Age: 67
End: 2017-02-27
Payer: MEDICARE

## 2017-02-27 ENCOUNTER — HOSPITAL ENCOUNTER (OUTPATIENT)
Dept: RADIOLOGY | Facility: HOSPITAL | Age: 67
Discharge: HOME | End: 2017-02-27
Attending: NURSE PRACTITIONER
Payer: MEDICARE

## 2017-02-27 VITALS
HEIGHT: 65 IN | DIASTOLIC BLOOD PRESSURE: 66 MMHG | TEMPERATURE: 98 F | WEIGHT: 293 LBS | SYSTOLIC BLOOD PRESSURE: 160 MMHG | OXYGEN SATURATION: 98 % | BODY MASS INDEX: 48.82 KG/M2 | HEART RATE: 105 BPM

## 2017-02-27 DIAGNOSIS — M65.4 DE QUERVAIN'S TENOSYNOVITIS, RIGHT: ICD-10-CM

## 2017-02-27 DIAGNOSIS — M79.644 THUMB PAIN, RIGHT: ICD-10-CM

## 2017-02-27 DIAGNOSIS — M25.531 RIGHT WRIST PAIN: ICD-10-CM

## 2017-02-27 DIAGNOSIS — M25.531 RIGHT WRIST PAIN: Primary | ICD-10-CM

## 2017-02-27 PROCEDURE — 99999 PR PBB SHADOW E&M-EST. PATIENT-LVL V: CPT | Mod: PBBFAC,,, | Performed by: NURSE PRACTITIONER

## 2017-02-27 PROCEDURE — 73110 X-RAY EXAM OF WRIST: CPT | Mod: 26,RT,, | Performed by: RADIOLOGY

## 2017-02-27 PROCEDURE — 73130 X-RAY EXAM OF HAND: CPT | Mod: 26,RT,, | Performed by: RADIOLOGY

## 2017-02-27 PROCEDURE — 73110 X-RAY EXAM OF WRIST: CPT | Mod: TC,PO,RT

## 2017-02-27 PROCEDURE — 99214 OFFICE O/P EST MOD 30 MIN: CPT | Mod: S$PBB,,, | Performed by: NURSE PRACTITIONER

## 2017-02-27 PROCEDURE — 73130 X-RAY EXAM OF HAND: CPT | Mod: TC,PO,RT

## 2017-02-27 NOTE — MR AVS SNAPSHOT
[facility]  [address] [place] [address]  Phone: [phone]  Fax: [phone]                  Annika Mac   2017 12:30 PM   Office Visit    Description:  Female : 1950   Provider:  PHIL Callahan   Department:  [facility]           Reason for Visit     Hand Pain           Diagnoses this Visit        Comments    Right wrist pain    -  Primary            To Do List           Future Appointments        Provider Department Dept Phone    3/21/2017 9:00 AM LABORATORY, [place] [facility] [phone]    3/28/2017 9:00 AM LOUISA [facility] - Endocrinology [phone]    3/28/2017 11:00 AM DIABETES EDUCATOR, INT MED 1 [facility] [phone]    2017 1:00 PM MD Miguel Frias [facility] - Vasc Diseases [phone]    2017 10:00 AM Enid Hines DPM [facility] - Podiatry [phone]      Goals (5 Years of Data)              16    HEMOGLOBIN A1C < 7.5   9.1  10.8  8.6      [facility]     [facility] - 24/ Assistance  Registered nurses in the [facility] provide clinical advisement, health education, appointment booking, and other advisory services.  Call for this free service at [phone].             Medications           Message regarding Medications     Verify the changes and/or additions to your medication regime listed below are the same as discussed with your clinician today.  If any of these changes or additions are incorrect, please notify your healthcare provider.             Verify that the below list of medications is an accurate representation of the medications you are currently taking.  If none reported, the list may be blank. If incorrect, please contact your healthcare provider. Carry this list with you in case of emergency.           Current Medications     albuterol 90  "mcg/actuation inhaler Inhale 2 puffs into the lungs every 6 (six) hours as needed for Wheezing.    allopurinol (ZYLOPRIM) 100 MG tablet Take 1 tablet by mouth once daily    aspirin 81 MG Chew Take 1 tablet (81 mg total) by mouth once daily.    azelastine (ASTELIN) 137 mcg (0.1 %) nasal spray 1 spray (137 mcg total) by Nasal route 2 (two) times daily.    BD INSULIN SYRINGE ULTRA-FINE 0.3 mL 30 gauge x 1/2" Syrg USE THREE TIMES DAILY FOR INSULIN INJECTIONS    blood-glucose meter kit Use as instructed, true test/result meter    colchicine 0.6 mg tablet Take 1 tablet (0.6 mg total) by mouth once daily.    duloxetine (CYMBALTA) 30 MG capsule Take 1 capsule (30 mg total) by mouth once daily.    fexofenadine (ALLEGRA) 180 MG tablet TAKE 1 TABLET BY MOUTH ONCE DAILY    fluticasone (FLONASE) 50 mcg/actuation nasal spray 2 sprays by Each Nare route once daily.    furosemide (LASIX) 20 MG tablet Take 1 tablet by mouth once a day    hydrocodone-acetaminophen 10-325mg (NORCO)  mg Tab Take 1 tablet by mouth every 8 (eight) hours as needed for Pain.    hydrocodone-acetaminophen 10-325mg (NORCO)  mg Tab Starting on Mar 16, 2017. Take 1 tablet by mouth every 8 (eight) hours as needed for Pain.    hydrocodone-acetaminophen 10-325mg (NORCO)  mg Tab Starting on Apr 16, 2017. Take 1 tablet by mouth every 8 (eight) hours as needed for Pain.    INSULIN REGULAR, HUMAN (INSULIN REGULAR HUM U-500 CONC SUBQ) Inject into the skin. 17 unit trina with meals     labetalol (NORMODYNE) 300 MG tablet Take 1 tablet by mouth two  times daily    lancets 31 gauge Misc 1 lancet by Misc.(Non-Drug; Combo Route) route 4 (four) times daily.    pantoprazole (PROTONIX) 40 MG tablet Take 1 tablet (40 mg total) by mouth once daily.    pen needle, diabetic 32 gauge x 1/5" Ndle 1 application by Misc.(Non-Drug; Combo Route) route 2 (two) times daily.    pen needle, diabetic 32 gauge x 1/6" Ndle 1 application by Misc.(Non-Drug; Combo Route) route 2 " (two) times daily.    pregabalin (LYRICA) 100 MG capsule Take 1 capsule (100 mg total) by mouth 2 (two) times daily.    simvastatin (ZOCOR) 40 MG tablet Take 1 tablet by mouth  every evening    spironolactone (ALDACTONE) 25 MG tablet Take 1 tablet by mouth once a day    TRUETEST TEST STRIPS Strp 1 strip by Misc.(Non-Drug; Combo Route) route 4 (four) times daily.    irbesartan (AVAPRO) 300 MG tablet Take 1 tablet (300 mg total) by mouth every evening.    levocetirizine (XYZAL) 5 MG tablet Take 1 tablet (5 mg total) by mouth every evening.    montelukast (SINGULAIR) 10 mg tablet TAKE 1 TABLET(10 MG) BY MOUTH EVERY EVENING           Clinical Reference Information           Your Vitals Were     BP                   160/66 (BP Location: Left arm, Patient Position: Sitting, BP Method: Manual)           Blood Pressure          Most Recent Value    BP  (!)  160/66      Allergies as of 2/27/2017     Iodinated Contrast Media - Iv Dye      Immunizations Administered on Date of Encounter - 2/27/2017     None      Orders Placed During Today's Visit     Future Labs/Procedures Expected by Expires    X-Ray Hand Complete Right  2/27/2017 2/27/2018    X-Ray Wrist Complete Right  2/27/2017 2/27/2018      Instructions      Wrist Splint: Velcro  A splint is designed to prevent movement of the bones, muscles and tendons. Velcro wrist splints are used because of their comfort and convenience for wrist and hand injuries. In certain conditions, the splint can be removed when bathing or changing clothes. The condition you are being treated for will determine how long you should wear the splint and if it is safe to remove your splint before your next visit. If you are unsure, ask your nurse or doctor.  When to seek medical advice  Call your healthcare provider right away if any of these occur:  · Increased pain or swelling under the splint or in the hand or fingers  · Fingers or hand becomes cold, blue, numb or tingly  Date Last Reviewed:  11/21/2015  © 9718-5194 [employer]. [address] [place] [address], [place] [address]. All rights reserved. This information is not intended as a substitute for professional medical care. Always follow your healthcare professional's instructions.             Language Assistance Services     ATTENTION: Language assistance services are available, free of charge. Please call [phone].      ATENCIÓN: Si habla español, tiene a mckinnon disposición servicios gratuitos de asistencia lingüística. Llame al [phone].     CHÚ Ý: N?u b?n nói Ti?ng Vi?t, có các d?ch v? h? tr? ngôn ng? mi?n phí dành cho b?n. G?i s? [phone].         [facility] complies with applicable Federal civil rights laws and does not discriminate on the basis of race, color, national origin, age, disability, or sex.

## 2017-02-27 NOTE — PATIENT INSTRUCTIONS
Wrist Splint: Velcro  A splint is designed to prevent movement of the bones, muscles and tendons. Velcro wrist splints are used because of their comfort and convenience for wrist and hand injuries. In certain conditions, the splint can be removed when bathing or changing clothes. The condition you are being treated for will determine how long you should wear the splint and if it is safe to remove your splint before your next visit. If you are unsure, ask your nurse or doctor.  When to seek medical advice  Call your healthcare provider right away if any of these occur:  · Increased pain or swelling under the splint or in the hand or fingers  · Fingers or hand becomes cold, blue, numb or tingly  Date Last Reviewed: 11/21/2015 © 2000-2016 [employer]. [address] [place] [address], [place] [address]. All rights reserved. This information is not intended as a substitute for professional medical care. Always follow your healthcare professional's instructions.

## 2017-02-27 NOTE — PROGRESS NOTES
"Subjective:       Patient ID: Annika Mac is a 66 y.o. female.    Chief Complaint: Hand Pain (right hand pain and swelling )    Wrist Pain    The pain is present in the right wrist and right hand. This is a new problem. Episode onset: 2 days. There has been no history of extremity trauma. The problem occurs constantly. The problem has been waxing and waning. The quality of the pain is described as aching. The pain is at a severity of 8/10. Associated symptoms include joint swelling, a limited range of motion and stiffness. Pertinent negatives include no fever, joint locking, numbness or tingling. The symptoms are aggravated by activity. Treatments tried: takes norco 10 for back pain. The treatment provided no relief. Her past medical history is significant for diabetes, gout and osteoarthritis.       BP (!) 160/66 (BP Location: Left arm, Patient Position: Sitting, BP Method: Manual)  Pulse 105  Temp 97.6 °F (36.4 °C) (Tympanic)   Ht 5' 5" (1.651 m)  Wt (!) 141.4 kg (311 lb 11.7 oz)  SpO2 98%  BMI 51.87 kg/m2    Review of Systems   Constitutional: Negative for activity change, appetite change, chills, diaphoresis, fatigue, fever and unexpected weight change.   HENT: Negative.    Respiratory: Negative for cough and shortness of breath.    Cardiovascular: Negative for chest pain, palpitations and leg swelling.   Gastrointestinal: Negative.    Endocrine:        Dm2, on insulin, a1c last 9.1   Genitourinary: Negative.         Hx ckd 3 gfr 38.2   Musculoskeletal: Positive for arthralgias, back pain (chronic), gait problem (with cane today), gout, joint swelling and stiffness. Negative for myalgias, neck pain and neck stiffness.   Skin: Negative for color change, pallor, rash and wound.   Allergic/Immunologic: Negative for immunocompromised state.   Neurological: Negative for dizziness, tingling, facial asymmetry and numbness.   Hematological: Negative for adenopathy. Does not bruise/bleed easily. "   Psychiatric/Behavioral: Negative for agitation, behavioral problems and confusion.       Objective:      Physical Exam   Constitutional: She appears well-developed and well-nourished. She is cooperative. No distress.   HENT:   Head: Normocephalic and atraumatic.   Cardiovascular: Normal rate.    Pulmonary/Chest: Effort normal.   Musculoskeletal: She exhibits tenderness.        Right wrist: She exhibits decreased range of motion, tenderness and swelling. She exhibits no bony tenderness, no effusion, no crepitus, no deformity and no laceration.   Generalized tenderness and mild swelling to right wrist, volar aspect, + right thumb pain, decreased ROM due to pain, decreased strength with  and palmar flexion. nv intact. 2+ radial pulses. + finkelstein. There is no erythema or warmth.    Neurological: She is alert.   Skin: Skin is warm and dry. No rash noted. She is not diaphoretic.   Psychiatric: She has a normal mood and affect. Her behavior is normal. Judgment and thought content normal.   Nursing note and vitals reviewed.      Assessment:       1. Right wrist pain    2. Thumb pain, right    3. De Quervain's tenosynovitis, right        Plan:       Annika was seen today for hand pain.    Diagnoses and all orders for this visit:    Right wrist pain  -     X-Ray Wrist Complete Right; Future  -     X-Ray Hand Complete Right; Future    Thumb pain, right    De Quervain's tenosynovitis, right      Juxta-articular osteopenia.  Mild degenerative change noted throughout the wrist and hand.  Widening of the scapholunate joint space noted.  No acute or healing fracture.  No radiopaque foreign body.  Faintly visualized calcific density projects along the dorsal medial margin of the second digit DIP joint.  Otherwise, no additional soft tissue calcification noted no focal erosion or periosteal reaction  Reviewed xray report with patient  patient with hx of gout, ckd 3, egfr 38.2, insulin dependant DM2, She is not able to take  nsaids due to kidneys and refuses steroids due to diabetes and elevated sugars. Patient states that this does not feel like gout and her gout has always presented in her feet. She takes allopurinol as prescribed. She denies injury or trauma. Patient was given a thumb spica splint due to the nature of her wrist pain extending to the thumb with + finkelstein test. She was also instructed to follow up with orthopedics for further evaluation. She mentions that she is established with orthopedics in [facility]. We will assist her in scheduling this. She will continue her norco prescribed for her back and use ice prn. Patient agrees with plan.

## 2017-03-06 ENCOUNTER — OFFICE VISIT (OUTPATIENT)
Dept: ORTHOPEDICS | Facility: CLINIC | Age: 67
End: 2017-03-06
Payer: MEDICARE

## 2017-03-06 VITALS
SYSTOLIC BLOOD PRESSURE: 126 MMHG | BODY MASS INDEX: 48.82 KG/M2 | HEIGHT: 65 IN | DIASTOLIC BLOOD PRESSURE: 61 MMHG | WEIGHT: 293 LBS | HEART RATE: 76 BPM

## 2017-03-06 DIAGNOSIS — G56.01 CARPAL TUNNEL SYNDROME OF RIGHT WRIST: Primary | ICD-10-CM

## 2017-03-06 PROCEDURE — 20526 THER INJECTION CARP TUNNEL: CPT | Mod: S$PBB,RT,, | Performed by: PHYSICIAN ASSISTANT

## 2017-03-06 PROCEDURE — 99215 OFFICE O/P EST HI 40 MIN: CPT | Mod: PBBFAC | Performed by: PHYSICIAN ASSISTANT

## 2017-03-06 PROCEDURE — 99999 PR PBB SHADOW E&M-EST. PATIENT-LVL V: CPT | Mod: PBBFAC,,, | Performed by: PHYSICIAN ASSISTANT

## 2017-03-06 PROCEDURE — 20526 THER INJECTION CARP TUNNEL: CPT | Mod: PBBFAC | Performed by: PHYSICIAN ASSISTANT

## 2017-03-06 PROCEDURE — 99214 OFFICE O/P EST MOD 30 MIN: CPT | Mod: 25,S$PBB,, | Performed by: PHYSICIAN ASSISTANT

## 2017-03-06 RX ORDER — BETAMETHASONE SODIUM PHOSPHATE AND BETAMETHASONE ACETATE 3; 3 MG/ML; MG/ML
6 INJECTION, SUSPENSION INTRA-ARTICULAR; INTRALESIONAL; INTRAMUSCULAR; SOFT TISSUE
Status: COMPLETED | OUTPATIENT
Start: 2017-03-06 | End: 2017-03-06

## 2017-03-06 RX ADMIN — BETAMETHASONE ACETATE AND BETAMETHASONE SODIUM PHOSPHATE 6 MG: 3; 3 INJECTION, SUSPENSION INTRA-ARTICULAR; INTRALESIONAL; INTRAMUSCULAR; SOFT TISSUE at 10:03

## 2017-03-06 NOTE — MR AVS SNAPSHOT
[facility] - Orthopedics  [address]  [facility] [address]  Phone: [phone]                  Annika Mac   3/6/2017 9:00 AM   Office Visit    Description:  Female : 1950   Provider:  Homero Leggett PA-C   Department:  [facility]           Reason for Visit     Right Hand - Pain                To Do List           Future Appointments        Provider Department Dept Phone    3/21/2017 9:00 AM LABORATORY, [place] [facility] [place] [phone]    3/28/2017 9:00 AM LOUISA Castro [facility] - Endocrinology [phone]    3/28/2017 11:00 AM DIABETES EDUCATOR, INT MED 1 [facility] - IM Diabetes Program [phone]    2017 1:00 PM MD Miguel Frias [facility] - Vasc Diseases [phone]    2017 10:00 AM Enid Hines DPM [facility] - Podiatry [phone]      Goals (5 Years of Data)              16    HEMOGLOBIN A1C < 7.5   9.1  10.8  8.6      [facility]     [facility] - 24/7 Assistance  Registered nurses in the [facility] provide clinical advisement, health education, appointment booking, and other advisory services.  Call for this free service at [phone].             Medications           Message regarding Medications     Verify the changes and/or additions to your medication regime listed below are the same as discussed with your clinician today.  If any of these changes or additions are incorrect, please notify your healthcare provider.             Verify that the below list of medications is an accurate representation of the medications you are currently taking.  If none reported, the list may be blank. If incorrect, please contact your healthcare provider. Carry this list with you in case of emergency.           Current Medications     albuterol 90 mcg/actuation inhaler Inhale 2 puffs into the lungs every 6 (six) hours as needed for Wheezing.     "allopurinol (ZYLOPRIM) 100 MG tablet Take 1 tablet by mouth once daily    aspirin 81 MG Chew Take 1 tablet (81 mg total) by mouth once daily.    azelastine (ASTELIN) 137 mcg (0.1 %) nasal spray 1 spray (137 mcg total) by Nasal route 2 (two) times daily.    BD INSULIN SYRINGE ULTRA-FINE 0.3 mL 30 gauge x 1/2" Syrg USE THREE TIMES DAILY FOR INSULIN INJECTIONS    blood-glucose meter kit Use as instructed, true test/result meter    colchicine 0.6 mg tablet Take 1 tablet (0.6 mg total) by mouth once daily.    duloxetine (CYMBALTA) 30 MG capsule Take 1 capsule (30 mg total) by mouth once daily.    fexofenadine (ALLEGRA) 180 MG tablet TAKE 1 TABLET BY MOUTH ONCE DAILY    fluticasone (FLONASE) 50 mcg/actuation nasal spray 2 sprays by Each Nare route once daily.    furosemide (LASIX) 20 MG tablet Take 1 tablet by mouth once a day    hydrocodone-acetaminophen 10-325mg (NORCO)  mg Tab Take 1 tablet by mouth every 8 (eight) hours as needed for Pain.    hydrocodone-acetaminophen 10-325mg (NORCO)  mg Tab Starting on Mar 16, 2017. Take 1 tablet by mouth every 8 (eight) hours as needed for Pain.    hydrocodone-acetaminophen 10-325mg (NORCO)  mg Tab Starting on Apr 16, 2017. Take 1 tablet by mouth every 8 (eight) hours as needed for Pain.    INSULIN REGULAR, HUMAN (INSULIN REGULAR HUM U-500 CONC SUBQ) Inject into the skin. 17 unit trina with meals     irbesartan (AVAPRO) 300 MG tablet Take 1 tablet (300 mg total) by mouth every evening.    labetalol (NORMODYNE) 300 MG tablet Take 1 tablet by mouth two  times daily    lancets 31 gauge Misc 1 lancet by Misc.(Non-Drug; Combo Route) route 4 (four) times daily.    levocetirizine (XYZAL) 5 MG tablet Take 1 tablet (5 mg total) by mouth every evening.    montelukast (SINGULAIR) 10 mg tablet TAKE 1 TABLET(10 MG) BY MOUTH EVERY EVENING    pantoprazole (PROTONIX) 40 MG tablet Take 1 tablet (40 mg total) by mouth once daily.    pen needle, diabetic 32 gauge x 1/5" Ndle 1 " "application by Misc.(Non-Drug; Combo Route) route 2 (two) times daily.    pen needle, diabetic 32 gauge x 1/6" Ndle 1 application by Misc.(Non-Drug; Combo Route) route 2 (two) times daily.    pregabalin (LYRICA) 100 MG capsule Take 1 capsule (100 mg total) by mouth 2 (two) times daily.    simvastatin (ZOCOR) 40 MG tablet Take 1 tablet by mouth  every evening    spironolactone (ALDACTONE) 25 MG tablet Take 1 tablet by mouth once a day    TRUETEST TEST STRIPS Strp 1 strip by Misc.(Non-Drug; Combo Route) route 4 (four) times daily.           Clinical Reference Information           Your Vitals Were     BP Pulse Height Weight BMI    126/61 76 5' 5" (1.651 m) 141.4 kg (311 lb 11.7 oz) 51.87 kg/m2      Blood Pressure          Most Recent Value    BP  126/61      Allergies as of 3/6/2017     Iodinated Contrast Media - Iv Dye      Immunizations Administered on Date of Encounter - 3/6/2017     None      Language Assistance Services     ATTENTION: Language assistance services are available, free of charge. Please call [phone].      ATENCIÓN: Si sneha dominguez, tiene a mckinnon disposición servicios gratuitos de asistencia lingüística. Llame al [phone].     JONAS Ý: N?u b?n nói Ti?ng Vi?t, có các d?ch v? h? tr? ngôn ng? mi?n phí dành cho b?n. G?i s? [phone].         [facility] complies with applicable Federal civil rights laws and does not discriminate on the basis of race, color, national origin, age, disability, or sex.        "

## 2017-03-06 NOTE — PROGRESS NOTES
"  SUBJECTIVE:     Chief Complaint & History of Present Illness:  Annika Mac is a New patient 66 y.o. female who is seen here today with a complaint of    Chief Complaint   Patient presents with    Right Hand - Pain    .  Patient is developed pain and decreased function in the right hand region ache in the wrist radiating to the thumb and first 2 digits.  She was seen by her primary care and has undergone conservative treatment continues to have pain and soreness.  She reports a past history of a surgical repair of de Quervain's in this same hand.   On a scale of 1-10, with 10 being worst pain imaginable, he rates this pain as 3 on good days and 5 on bad days.  she describes the pain as tender sore and tingling.    Review of patient's allergies indicates:   Allergen Reactions    Iodinated contrast media - iv dye Rash         Current Outpatient Prescriptions   Medication Sig Dispense Refill    albuterol 90 mcg/actuation inhaler Inhale 2 puffs into the lungs every 6 (six) hours as needed for Wheezing. 1 Inhaler 0    allopurinol (ZYLOPRIM) 100 MG tablet Take 1 tablet by mouth once daily 30 tablet 3    aspirin 81 MG Chew Take 1 tablet (81 mg total) by mouth once daily.  0    azelastine (ASTELIN) 137 mcg (0.1 %) nasal spray 1 spray (137 mcg total) by Nasal route 2 (two) times daily. 30 mL 11    BD INSULIN SYRINGE ULTRA-FINE 0.3 mL 30 gauge x 1/2" Syrg USE THREE TIMES DAILY FOR INSULIN INJECTIONS 300 each 2    blood-glucose meter kit Use as instructed, true test/result meter 1 each 0    colchicine 0.6 mg tablet Take 1 tablet (0.6 mg total) by mouth once daily. 30 tablet 3    duloxetine (CYMBALTA) 30 MG capsule Take 1 capsule (30 mg total) by mouth once daily. 90 capsule 3    fexofenadine (ALLEGRA) 180 MG tablet TAKE 1 TABLET BY MOUTH ONCE DAILY 30 tablet 0    fluticasone (FLONASE) 50 mcg/actuation nasal spray 2 sprays by Each Nare route once daily. 48 g 6    furosemide (LASIX) 20 MG tablet Take 1 tablet by " "mouth once a day 90 tablet 3    hydrocodone-acetaminophen 10-325mg (NORCO)  mg Tab Take 1 tablet by mouth every 8 (eight) hours as needed for Pain. 90 tablet 0    [START ON 3/16/2017] hydrocodone-acetaminophen 10-325mg (NORCO)  mg Tab Take 1 tablet by mouth every 8 (eight) hours as needed for Pain. 90 tablet 0    [START ON 4/16/2017] hydrocodone-acetaminophen 10-325mg (NORCO)  mg Tab Take 1 tablet by mouth every 8 (eight) hours as needed for Pain. 90 tablet 0    INSULIN REGULAR, HUMAN (INSULIN REGULAR HUM U-500 CONC SUBQ) Inject into the skin. 17 unit trina with meals       irbesartan (AVAPRO) 300 MG tablet Take 1 tablet (300 mg total) by mouth every evening. 90 tablet 3    labetalol (NORMODYNE) 300 MG tablet Take 1 tablet by mouth two  times daily 120 tablet 3    lancets 31 gauge Misc 1 lancet by Misc.(Non-Drug; Combo Route) route 4 (four) times daily. 150 each 6    levocetirizine (XYZAL) 5 MG tablet Take 1 tablet (5 mg total) by mouth every evening. 7 tablet 0    montelukast (SINGULAIR) 10 mg tablet TAKE 1 TABLET(10 MG) BY MOUTH EVERY EVENING 90 tablet 0    pantoprazole (PROTONIX) 40 MG tablet Take 1 tablet (40 mg total) by mouth once daily. 30 tablet 6    pen needle, diabetic 32 gauge x 1/5" Ndle 1 application by Misc.(Non-Drug; Combo Route) route 2 (two) times daily. 100 each 11    pen needle, diabetic 32 gauge x 1/6" Ndle 1 application by Misc.(Non-Drug; Combo Route) route 2 (two) times daily. 100 each 11    pregabalin (LYRICA) 100 MG capsule Take 1 capsule (100 mg total) by mouth 2 (two) times daily. 60 capsule 5    simvastatin (ZOCOR) 40 MG tablet Take 1 tablet by mouth  every evening 90 tablet 4    spironolactone (ALDACTONE) 25 MG tablet Take 1 tablet by mouth once a day 90 tablet 3    TRUETEST TEST STRIPS Strp 1 strip by Misc.(Non-Drug; Combo Route) route 4 (four) times daily. 150 strip 6     No current facility-administered medications for this visit.        Past Medical " History:   Diagnosis Date    Allergy     Anemia 2012    Arthritis     CHF (congestive heart failure)     CKD (chronic kidney disease) stage 3, GFR 30-59 ml/min 2012    Clotting disorder     Deep vein thrombophlebitis of left leg 2012    Degenerative disc disease     Diabetic peripheral neuropathy associated with type 2 diabetes mellitus 2012    GERD (gastroesophageal reflux disease)     HTN (hypertension) 2012    Hyperlipidemia 2012    Lead-induced gout of ankle or foot     right going on three weeks    Nonproliferative diabetic retinopathy of right eye 2012    Obstructive sleep apnea 2012    Osteoporosis     Proliferative diabetic retinopathy of left eye 2012    Stroke 2003    Type 2 diabetes mellitus with diabetic polyneuropathy 2012    Ulcer        Past Surgical History:   Procedure Laterality Date    CATARACT EXTRACTION W/  INTRAOCULAR LENS IMPLANT Left 3/2002    left     CATARACT EXTRACTION W/  INTRAOCULAR LENS IMPLANT Right     OD dr. olivares     SECTION      CYST REMOVAL  2011    left side of face    EYE SURGERY Bilateral 2012    eye implants    GANGLION CYST EXCISION  2007    Right wrist    Pan Retinal Photocoagulation Bilateral     Dr. Monterroso (Proliferative Diabetic Retinopathy)    TOTAL ABDOMINAL HYSTERECTOMY  1982    TOTAL KNEE ARTHROPLASTY  2006    left    TRIGGER FINGER RELEASE  2009       Vital Signs (Most Recent)  Vitals:    17 0941   BP: 126/61   Pulse: 76           Review of Systems:  ROS:  Constitutional: no fever or chills  Eyes: no visual changes  ENT: no nasal congestion or sore throat  Respiratory: no cough or shortness of breath  Cardiovascular: no chest pain or palpitations  Gastrointestinal: no nausea or vomiting, tolerating diet, Positive history of GERD  Genitourinary: no hematuria or dysuria, Chronic kidney disease stage III  Integument/Breast: no rash or  "pruritis  Hematologic/Lymphatic: no easy bruising or lymphadenopathy, Positive history gout  Musculoskeletal: no arthralgias or myalgias  Neurological: no seizures or tremors  Behavioral/Psych: no auditory or visual hallucinations  Endocrine: no heat or cold intolerance, Diabetes type 2 with retinopathy                OBJECTIVE:     PHYSICAL EXAM:  Height: 5' 5" (165.1 cm) Weight: (!) 141.4 kg (311 lb 11.7 oz), General Appearance: Well nourished, well developed, in no acute distress.  Neurological: Mood & affect are normal.  right hand and wrist   History of injury: none  Pain: Description: moderate and worsening  Night pain: yes, moderate and worsening  Rest pain: yes, moderate and worsening  Quality: aching, throbbing and tingling  Location: wrist, palmar, palm, thumb, index finger and middle finger  Neurological complaints: Numbness: mild, moderate, worsening and intermittent.  Condition of skin:intact  Hand Exam: negative Phalen, positive Tinel and positive Cozen  ROM:fingers flex to palm and thumb flexes to palm    Wrist Exam: palmar flexion 90/90, dorsiflexion 90/90, pronation 90/90, supination 90/90, flexion contracture 0/0, extension contracture 0/0, radial deviation 25/25, ulnar deviation 25/25      RADIOGRAPHS:  X-rays from previous visit reviewed by me today demonstrate no evidence of fracture dislocation or advanced degenerative joint disease    ASSESSMENT/PLAN:     Plan: We discussed with the patient at length all the different treatment options available for her wrist, including anti-inflammatories, acetaminophen, rest, ice, physical therapy to include strengthening, range of motion exercise ultrasound, splinting,  occasional cortisone injections for temporary relief,  or possible surgical interventions.  We'll proceed with therapeutic diagnostic cortisone injection of the carpal tunnel    The injection site was identified and the skin was prepared with an ETOH  solution. The right  carpal tunnel was " injected with 6mg of Celestone and 0.5 ml Lidocaine under sterile technique. Annika Mac tolerated the procedure well, she was advised to rest the wrist today, ice and elevation. she did receive immediate relief of the pain and triggering in and about her wrist and fingers she was told this would be short lived and is secondary to the lidocaine. she may have an increase in his discomfort tonight followed by steady improvement over the next several days. I may take 1-3 weeks following the injection to get the full benefit of the medication.  I will see her back in 3-6 weeks. Sooner if she has any problems or concerns.      Annika Mac was advised to monitor her blood sugars closely over the next several days. The steroid may cause a rise in them. If her glucose levels rise to a point she is uncomfortable or he is unable to control them is is to contact his PCP or go immediately to the emergency department.

## 2017-03-21 ENCOUNTER — LAB VISIT (OUTPATIENT)
Dept: LAB | Facility: HOSPITAL | Age: 67
End: 2017-03-21
Attending: NURSE PRACTITIONER
Payer: MEDICARE

## 2017-03-21 DIAGNOSIS — Z79.4 TYPE 2 DIABETES MELLITUS WITH DIABETIC POLYNEUROPATHY, WITH LONG-TERM CURRENT USE OF INSULIN: ICD-10-CM

## 2017-03-21 DIAGNOSIS — E11.42 TYPE 2 DIABETES MELLITUS WITH DIABETIC POLYNEUROPATHY, WITH LONG-TERM CURRENT USE OF INSULIN: ICD-10-CM

## 2017-03-21 LAB
ALBUMIN SERPL BCP-MCNC: 3.4 G/DL
ALP SERPL-CCNC: 114 U/L
ALT SERPL W/O P-5'-P-CCNC: 13 U/L
ANION GAP SERPL CALC-SCNC: 5 MMOL/L
AST SERPL-CCNC: 17 U/L
BILIRUB SERPL-MCNC: 0.3 MG/DL
BUN SERPL-MCNC: 33 MG/DL
CALCIUM SERPL-MCNC: 9.7 MG/DL
CHLORIDE SERPL-SCNC: 106 MMOL/L
CO2 SERPL-SCNC: 28 MMOL/L
CREAT SERPL-MCNC: 1.6 MG/DL
EST. GFR  (AFRICAN AMERICAN): 38.2 ML/MIN/1.73 M^2
EST. GFR  (NON AFRICAN AMERICAN): 33.1 ML/MIN/1.73 M^2
GLUCOSE SERPL-MCNC: 155 MG/DL
POTASSIUM SERPL-SCNC: 4.3 MMOL/L
PROT SERPL-MCNC: 6.6 G/DL
SODIUM SERPL-SCNC: 139 MMOL/L

## 2017-03-21 PROCEDURE — 83036 HEMOGLOBIN GLYCOSYLATED A1C: CPT

## 2017-03-21 PROCEDURE — 36415 COLL VENOUS BLD VENIPUNCTURE: CPT | Mod: PO

## 2017-03-21 PROCEDURE — 80053 COMPREHEN METABOLIC PANEL: CPT

## 2017-03-22 LAB
ESTIMATED AVG GLUCOSE: 223 MG/DL
HBA1C MFR BLD HPLC: 9.4 %

## 2017-03-23 DIAGNOSIS — T78.40XD ALLERGY, SUBSEQUENT ENCOUNTER: ICD-10-CM

## 2017-03-23 RX ORDER — MINERAL OIL
ENEMA (ML) RECTAL
Qty: 30 TABLET | Refills: 0 | Status: SHIPPED | OUTPATIENT
Start: 2017-03-23 | End: 2017-04-27 | Stop reason: SDUPTHER

## 2017-03-28 ENCOUNTER — OFFICE VISIT (OUTPATIENT)
Dept: ENDOCRINOLOGY | Facility: CLINIC | Age: 67
End: 2017-03-28
Payer: MEDICARE

## 2017-03-28 ENCOUNTER — CLINICAL SUPPORT (OUTPATIENT)
Dept: DIABETES | Facility: CLINIC | Age: 67
End: 2017-03-28
Payer: MEDICARE

## 2017-03-28 ENCOUNTER — TELEPHONE (OUTPATIENT)
Dept: PHARMACY | Facility: CLINIC | Age: 67
End: 2017-03-28

## 2017-03-28 VITALS
BODY MASS INDEX: 57.52 KG/M2 | SYSTOLIC BLOOD PRESSURE: 110 MMHG | HEIGHT: 60 IN | HEART RATE: 72 BPM | WEIGHT: 293 LBS | DIASTOLIC BLOOD PRESSURE: 60 MMHG

## 2017-03-28 DIAGNOSIS — E11.42 TYPE 2 DIABETES MELLITUS WITH DIABETIC POLYNEUROPATHY, WITH LONG-TERM CURRENT USE OF INSULIN: ICD-10-CM

## 2017-03-28 DIAGNOSIS — E11.42 TYPE 2 DIABETES MELLITUS WITH DIABETIC POLYNEUROPATHY, WITH LONG-TERM CURRENT USE OF INSULIN: Primary | ICD-10-CM

## 2017-03-28 DIAGNOSIS — G63 POLYNEUROPATHY ASSOCIATED WITH UNDERLYING DISEASE: ICD-10-CM

## 2017-03-28 DIAGNOSIS — N18.30 CKD (CHRONIC KIDNEY DISEASE) STAGE 3, GFR 30-59 ML/MIN: Chronic | ICD-10-CM

## 2017-03-28 DIAGNOSIS — Z79.4 TYPE 2 DIABETES MELLITUS WITH DIABETIC POLYNEUROPATHY, WITH LONG-TERM CURRENT USE OF INSULIN: ICD-10-CM

## 2017-03-28 DIAGNOSIS — Z79.4 TYPE 2 DIABETES MELLITUS WITH DIABETIC POLYNEUROPATHY, WITH LONG-TERM CURRENT USE OF INSULIN: Primary | ICD-10-CM

## 2017-03-28 DIAGNOSIS — E66.01 MORBID OBESITY DUE TO EXCESS CALORIES: Chronic | ICD-10-CM

## 2017-03-28 DIAGNOSIS — E11.3599 PROLIFERATIVE DIABETIC RETINOPATHY ASSOCIATED WITH TYPE 2 DIABETES MELLITUS, MACULAR EDEMA PRESENCE UNSPECIFIED, UNSPECIFIED LATERALITY: ICD-10-CM

## 2017-03-28 DIAGNOSIS — E78.5 DYSLIPIDEMIA: Chronic | ICD-10-CM

## 2017-03-28 PROCEDURE — G0108 DIAB MANAGE TRN  PER INDIV: HCPCS | Mod: PBBFAC | Performed by: DIETITIAN, REGISTERED

## 2017-03-28 PROCEDURE — 99999 PR PBB SHADOW E&M-EST. PATIENT-LVL V: CPT | Mod: PBBFAC,,, | Performed by: NURSE PRACTITIONER

## 2017-03-28 PROCEDURE — 99214 OFFICE O/P EST MOD 30 MIN: CPT | Mod: S$PBB,,, | Performed by: NURSE PRACTITIONER

## 2017-03-28 NOTE — TELEPHONE ENCOUNTER
----- Message from Flori Miller RD sent at 3/28/2017  3:17 PM CDT -----  Durga Gil Ms. Bubba called me back - her insurance does cover U-500 pens. Though she stated the cost is $90 for 30 day supply, she requested to go through with the Rx for the pens.     Ekta - would she be able to apply for assistance?    Thanks!  Flori

## 2017-03-28 NOTE — TELEPHONE ENCOUNTER
Durga Ruby,     I sent an electronic Rx to our pharmacy and also placed a referral to pharmacy pt assistance.     Thank you for your help,     ERLINDA.

## 2017-03-28 NOTE — MR AVS SNAPSHOT
[facility]  [address]  [facility] [address]  Phone: [phone]                  Annika Mac   3/28/2017 9:00 AM   Office Visit    Description:  Female : 1950   Provider:  LOUISA Castro   Department:  [facility]           Reason for Visit     Diabetes Mellitus           Diagnoses this Visit        Comments    Type 2 diabetes mellitus with diabetic polyneuropathy, with long-term current use of insulin    -  Primary     Polyneuropathy associated with underlying disease         CKD (chronic kidney disease) stage 3, GFR 30-59 ml/min         Proliferative diabetic retinopathy associated with type 2 diabetes mellitus, macular edema presence unspecified, unspecified laterality         Morbid obesity due to excess calories         Dyslipidemia                To Do List           Future Appointments        Provider Department Dept Phone    3/28/2017 11:00 AM DIABETES EDUCATOR, INT MED 1 [facility] [phone]    2017 1:00 PM Blair Rizo MD [facility] - Vasc Diseases [phone]    2017 10:00 AM Enid Hines DPM [facility] - Podiatry [phone]    2017 10:50 AM LAB, APPOINTMENT [place] [facility] [phone]    2017 10:55 AM SPECIMEN, MAIN CAMPUS [facility] [phone]      Goals (5 Years of Data)              3/21/17    11/25/16    8/25/16    HEMOGLOBIN A1C < 7.5   9.4  9.1  10.8      Follow-Up and Disposition     Return in about 3 months (around 2017).       These Medications        Disp Refills Start End     insulin regular U-500 concentrated 500 unit/mL Soln 20 mL 3 3/28/2017     Inject into skin 17 unit trina with breakfast, 13 unit trina with lunch, 4 unit trina with dinner.    Pharmacy: [facility] [id number] [place], [place] [id number] [facility] [address] Ph #: [phone]         [facility]     [facility]  "Call Nurse Care Line - 24/7 Assistance  Registered nurses in the [facility] provide clinical advisement, health education, appointment booking, and other advisory services.  Call for this free service at [phone].             Medications           Message regarding Medications     Verify the changes and/or additions to your medication regime listed below are the same as discussed with your clinician today.  If any of these changes or additions are incorrect, please notify your healthcare provider.        CHANGE how you are taking these medications     Start Taking Instead of     insulin regular U-500 concentrated 500 unit/mL Soln INSULIN REGULAR, HUMAN (INSULIN REGULAR HUM U-500 CONC SUBQ)    Dosage:  Inject into skin 17 unit trina with breakfast, 13 unit trina with lunch, 4 unit trina with dinner. Dosage:  Inject into the skin. 17 unit trina with meals     Reason for Change:  Reorder       STOP taking these medications     labetalol (NORMODYNE) 300 MG tablet Take 1 tablet by mouth two  times daily           Verify that the below list of medications is an accurate representation of the medications you are currently taking.  If none reported, the list may be blank. If incorrect, please contact your healthcare provider. Carry this list with you in case of emergency.           Current Medications      insulin regular U-500 concentrated 500 unit/mL Soln Inject into skin 17 unit trina with breakfast, 13 unit trina with lunch, 4 unit trina with dinner.    albuterol 90 mcg/actuation inhaler Inhale 2 puffs into the lungs every 6 (six) hours as needed for Wheezing.    allopurinol (ZYLOPRIM) 100 MG tablet Take 1 tablet by mouth once daily    aspirin 81 MG Chew Take 1 tablet (81 mg total) by mouth once daily.    azelastine (ASTELIN) 137 mcg (0.1 %) nasal spray 1 spray (137 mcg total) by Nasal route 2 (two) times daily.    BD INSULIN SYRINGE ULTRA-FINE 0.3 mL 30 gauge x 1/2" Syrg USE THREE TIMES DAILY FOR INSULIN " "INJECTIONS    blood-glucose meter kit Use as instructed, true test/result meter    colchicine 0.6 mg tablet Take 1 tablet (0.6 mg total) by mouth once daily.    duloxetine (CYMBALTA) 30 MG capsule Take 1 capsule (30 mg total) by mouth once daily.    fexofenadine (ALLEGRA) 180 MG tablet TAKE 1 TABLET BY MOUTH ONCE DAILY    fluticasone (FLONASE) 50 mcg/actuation nasal spray 2 sprays by Each Nare route once daily.    furosemide (LASIX) 20 MG tablet Take 1 tablet by mouth once a day    hydrocodone-acetaminophen 10-325mg (NORCO)  mg Tab Starting on Apr 16, 2017. Take 1 tablet by mouth every 8 (eight) hours as needed for Pain.    irbesartan (AVAPRO) 300 MG tablet Take 1 tablet (300 mg total) by mouth every evening.    lancets 31 gauge Misc 1 lancet by Misc.(Non-Drug; Combo Route) route 4 (four) times daily.    pantoprazole (PROTONIX) 40 MG tablet Take 1 tablet (40 mg total) by mouth once daily.    pen needle, diabetic 32 gauge x 1/5" Ndle 1 application by Misc.(Non-Drug; Combo Route) route 2 (two) times daily.    pen needle, diabetic 32 gauge x 1/6" Ndle 1 application by Misc.(Non-Drug; Combo Route) route 2 (two) times daily.    pregabalin (LYRICA) 100 MG capsule Take 1 capsule (100 mg total) by mouth 2 (two) times daily.    simvastatin (ZOCOR) 40 MG tablet Take 1 tablet by mouth  every evening    spironolactone (ALDACTONE) 25 MG tablet Take 1 tablet by mouth once a day    TRUETEST TEST STRIPS Strp 1 strip by Misc.(Non-Drug; Combo Route) route 4 (four) times daily.    levocetirizine (XYZAL) 5 MG tablet Take 1 tablet (5 mg total) by mouth every evening.    montelukast (SINGULAIR) 10 mg tablet TAKE 1 TABLET(10 MG) BY MOUTH EVERY EVENING           Clinical Reference Information           Your Vitals Were     BP Pulse Height Weight BMI    110/60 72 5' (1.524 m) 142 kg (313 lb) 61.13 kg/m2      Blood Pressure          Most Recent Value    BP  110/60      Allergies as of 3/28/2017     Iodinated Contrast Media - Iv Dye    "   Immunizations Administered on Date of Encounter - 3/28/2017     None      Orders Placed During Today's Visit     Future Labs/Procedures Expected by Expires    Comprehensive metabolic panel  3/28/2017 5/27/2018    Hemoglobin A1c  3/28/2017 5/27/2018    Lipid panel  3/28/2017 5/27/2018    TSH  3/28/2017 5/27/2018      Language Assistance Services     ATTENTION: Language assistance services are available, free of charge. Please call [phone].      ATENCIÓN: Si habla español, tiene a mckinnon disposición servicios gratuitos de asistencia lingüística. Llame al [phone].     CHÚ Ý: N?u b?n nói Ti?ng Vi?t, có các d?ch v? h? tr? ngôn ng? mi?n phí dành cho b?n. G?i s? [phone].         [facility] complies with applicable Federal civil rights laws and does not discriminate on the basis of race, color, national origin, age, disability, or sex.

## 2017-03-28 NOTE — PROGRESS NOTES
Diabetes Education  Author: Flori Miller RD  Date: 3/28/2017    Diabetes Education Visit  Diabetes Education Record Assessment/Progress: Post Program/Follow-up    Diabetes Type  Diabetes Type : Type II    Diabetes History  Diabetes Diagnosis: >10 years    Nutrition  Meal Planning: 3 meals per day, water, diet drinks, eats out seldom (Reports following meal plan previously discussed, avoiding snacking between meals)    Monitoring   Self Monitoring : SMBG 3 times daily - did not bring logs today - reports BG have been running higher lately d/t steroid medication  Blood Glucose Logs: No         Current Diabetes Treatment   Current Treatment: Oral Medication, Insulin (Januvia 50mg once daily, U-500 17-13-4 units before meals. )    Social History  Preferred Learning Method: Face to Face, Reading Materials  Primary Support: Self, Spouse (spouse here today but stayed in waiting area)                             Barriers to Change  Barriers to Change: None  Learning Challenges : None    Readiness to Learn   Readiness to Learn : Acceptance    Cultural Influences  Cultural Influences: No    Diabetes Education Assessment/Progress    Acute Complications (preventing, detecting, and treating acute complications): Competent (verbalizes/demonstrates) (Aware of s/s of hypoglycemia and verbalized appropriate treatment.)    Nutrition (Incorporating nutritional management into one's lifestyle): Discussion, Instructed, Written Materials Provided, Competent (verbalizes/demonstrates), Individual Session (Pt reports continuing to follow meal plan discussed at last visit. Verbalized good understanding of portions and is avoiding snacking between meals. Applauded efforts and encouraged continued portion control.)    Medications (states correct name, dose, onset, peak, duration, side effects & timing of meds): Discussion, Instructed, Written Materials Provided, Competent (verbalizes/demonstrates), Individual Session, Return Demonstration,  Demonstration (Discussed U-500 pen - stressed U-500 is very concentrated, pen is different from vial and syringe in that pen does not require any conversion, and pt would dial pen to exact prescribed dose. Provided U-500 pen training with examples. Discussed mechanism of action of U-500 pen, storage, site rotation, and disposal of pen needles. Demonstrated how to attach a new pen needle, conduct a safety test, dial the prescribed dose, administer injection using demo pillow, and remove the used needle. Patient performed successful return demonstration.    Per Louise Schaefer NP, pt needs to call insurance to check on coverage and affordability of U-500 pen. Pt has been working with Patient Pharmacy Assistance for U-500 vials. Pt verbalized will call insurance company and inform Louise of coverage. )    Monitoring (monitoring blood glucose/other parameters & using results): Discussion, Instructed, Written Materials Provided, Competent (verbalizes/demonstrates), Individual Session (Reinforced SMBG AC/HS and bring log to appts. Provided additional log sheets. )    Goals  Medications: Set (Call insurance regarding coverage of u-500 pen and contact DIXIE Schaefer NP with findings. )  Start Date: 03/28/17  Target Date: 04/28/17         Diabetes Care Plan/Intervention  Education Plan/Intervention: Individual Follow-Up DSMT (follow up DE same day as next endo appt per pt request.)    Diabetes Meal Plan  Carbohydrate Per Meal: 30-45g    Education Units of Time   Time Spent: 30 min      Health Maintenance Topics with due status: Not Due       Topic Last Completion Date    Colonoscopy 04/03/2008    Lipid Panel 04/29/2016    Foot Exam 07/28/2016    Mammogram 10/10/2016    Eye Exam 10/18/2016    Hemoglobin A1c 03/21/2017     Health Maintenance Due   Topic Date Due    TETANUS VACCINE  12/08/1967    DEXA SCAN  12/08/1989    Zoster Vaccine  12/08/2009

## 2017-03-28 NOTE — TELEPHONE ENCOUNTER
----- Message from Flori Miller RD sent at 3/28/2017 11:22 AM CDT -----  Durga Gil,     Ms. Mac and I have gone over the U-500 pen, and she does prefer to switch. She has requested a paper Rx for the pen, since she has been working with Ekta Knight in Patient Pharmacy Assistance. Could I possibly come by today to  the paper Rx from you?\    Thank you!  Flori

## 2017-03-29 NOTE — TELEPHONE ENCOUNTER
----- Message from Jessi Paulino sent at 3/28/2017  2:49 PM CDT -----  Contact: Self [phone]  Pt missed your call - the pt can be reached at [phone].

## 2017-03-30 ENCOUNTER — TELEPHONE (OUTPATIENT)
Dept: PHARMACY | Facility: CLINIC | Age: 67
End: 2017-03-30

## 2017-04-03 RX ORDER — PEN NEEDLE, DIABETIC 30 GX3/16"
NEEDLE, DISPOSABLE MISCELLANEOUS
Qty: 200 EACH | Refills: 12 | Status: SHIPPED | OUTPATIENT
Start: 2017-04-03 | End: 2017-05-23

## 2017-04-05 ENCOUNTER — OFFICE VISIT (OUTPATIENT)
Dept: CARDIOLOGY | Facility: CLINIC | Age: 67
End: 2017-04-05
Payer: MEDICARE

## 2017-04-05 VITALS
BODY MASS INDEX: 57.52 KG/M2 | WEIGHT: 293 LBS | DIASTOLIC BLOOD PRESSURE: 78 MMHG | HEART RATE: 72 BPM | SYSTOLIC BLOOD PRESSURE: 112 MMHG | HEIGHT: 60 IN

## 2017-04-05 DIAGNOSIS — G63 POLYNEUROPATHY ASSOCIATED WITH UNDERLYING DISEASE: ICD-10-CM

## 2017-04-05 DIAGNOSIS — I10 ESSENTIAL HYPERTENSION: ICD-10-CM

## 2017-04-05 DIAGNOSIS — E78.5 DYSLIPIDEMIA: Chronic | ICD-10-CM

## 2017-04-05 DIAGNOSIS — I73.9 PAD (PERIPHERAL ARTERY DISEASE): Primary | Chronic | ICD-10-CM

## 2017-04-05 PROCEDURE — 99999 PR PBB SHADOW E&M-EST. PATIENT-LVL IV: CPT | Mod: PBBFAC,,, | Performed by: INTERNAL MEDICINE

## 2017-04-05 PROCEDURE — 99213 OFFICE O/P EST LOW 20 MIN: CPT | Mod: S$PBB,,, | Performed by: INTERNAL MEDICINE

## 2017-04-05 PROCEDURE — 99214 OFFICE O/P EST MOD 30 MIN: CPT | Mod: PBBFAC | Performed by: INTERNAL MEDICINE

## 2017-04-05 RX ORDER — LABETALOL 300 MG/1
TABLET, FILM COATED ORAL
COMMUNITY
Start: 2017-03-31 | End: 2017-06-09 | Stop reason: SDUPTHER

## 2017-04-05 NOTE — PROGRESS NOTES
Subjective:    Patient ID:  Annika Mac is a 66 y.o. female who presents for follow-up of peripheral vascular disease     HPI  Ms. Mac presents for PVD follow-up.  She reports the same chronic intermittently numbness/tingling in the lower legs at rest, right>left.  There is no rash, edema, cramping or claudication.  Last year vascular study  revealed normal resting DANIEL with moderate decrease upon exertion.  This was stable from 2013.  Her activity is limited by back pain.  Her blood pressure is controlled and diabetes control is not under control.  She has regular podiatry follow up.    Review of Systems   Constitution: Negative.   HENT: Negative.    Eyes: Negative.    Cardiovascular: Negative for chest pain, claudication and cyanosis.   Respiratory: Negative.    Hematologic/Lymphatic: Negative.    Skin: Negative.    Musculoskeletal: Positive for back pain and myalgias.   Neurological: Positive for numbness and paresthesias.        Patient Active Problem List   Diagnosis    Type 2 diabetes mellitus with diabetic polyneuropathy    Obstructive sleep apnea    Stroke    Anemia    CKD (chronic kidney disease) stage 3, GFR 30-59 ml/min    Epiretinal membrane    Vitreous hemorrhage    Gout, arthritis    Gout, chronic    Morbid obesity due to excess calories    Proliferative diabetic retinopathy    PAD (peripheral artery disease)    Lumbar facet arthropathy    Cataract fragments in eye following cataract surgery    Pseudophakia of both eyes    PCO (posterior capsular opacification)    Peripheral autonomic neuropathy due to DM    Diastolic dysfunction    Chronic back pain    Lumbar spinal stenosis    Peripheral neuropathy    Vitamin D insufficiency    Cough    Essential hypertension    Dyslipidemia    Acute pain of right knee    Primary osteoarthritis of right knee    Gout due to renal impairment involving toe of right foot    Cough syncope    GERD (gastroesophageal reflux disease)  "      Current Outpatient Prescriptions   Medication Sig Dispense Refill    albuterol 90 mcg/actuation inhaler Inhale 2 puffs into the lungs every 6 (six) hours as needed for Wheezing. 1 Inhaler 0    allopurinol (ZYLOPRIM) 100 MG tablet Take 1 tablet by mouth once daily 30 tablet 3    aspirin 81 MG Chew Take 1 tablet (81 mg total) by mouth once daily.  0    azelastine (ASTELIN) 137 mcg (0.1 %) nasal spray 1 spray (137 mcg total) by Nasal route 2 (two) times daily. 30 mL 11    BD INSULIN SYRINGE ULTRA-FINE 0.3 mL 30 gauge x 1/2" Syrg USE THREE TIMES DAILY FOR INSULIN INJECTIONS 300 each 2    blood-glucose meter kit Use as instructed, true test/result meter 1 each 0    colchicine 0.6 mg tablet Take 1 tablet (0.6 mg total) by mouth once daily. 30 tablet 3    duloxetine (CYMBALTA) 30 MG capsule Take 1 capsule (30 mg total) by mouth once daily. 90 capsule 3    fexofenadine (ALLEGRA) 180 MG tablet TAKE 1 TABLET BY MOUTH ONCE DAILY 30 tablet 0    fluticasone (FLONASE) 50 mcg/actuation nasal spray 2 sprays by Each Nare route once daily. 48 g 6    furosemide (LASIX) 20 MG tablet Take 1 tablet by mouth once a day 90 tablet 3    [START ON 4/16/2017] hydrocodone-acetaminophen 10-325mg (NORCO)  mg Tab Take 1 tablet by mouth every 8 (eight) hours as needed for Pain. 90 tablet 0    insulin regular hum U-500 conc 500 unit/mL (3 mL) InPn Inject 20-85 Units into the skin 3 (three) times daily before meals. Use 85 units with breakfast, 65 units with lunch, 20 units with dinner. 11 Syringe 3    irbesartan (AVAPRO) 300 MG tablet Take 1 tablet (300 mg total) by mouth every evening. 90 tablet 3    labetalol (NORMODYNE) 300 MG tablet       lancets 31 gauge Misc 1 lancet by Misc.(Non-Drug; Combo Route) route 4 (four) times daily. 150 each 6    levocetirizine (XYZAL) 5 MG tablet Take 1 tablet (5 mg total) by mouth every evening. 7 tablet 0    montelukast (SINGULAIR) 10 mg tablet TAKE 1 TABLET(10 MG) BY MOUTH EVERY " "EVENING 90 tablet 0    pantoprazole (PROTONIX) 40 MG tablet Take 1 tablet (40 mg total) by mouth once daily. 30 tablet 6    pen needle, diabetic 32 gauge x 5/32" Ndle Use with multiple daily insulin injections 200 each 12    pregabalin (LYRICA) 100 MG capsule Take 1 capsule (100 mg total) by mouth 2 (two) times daily. 60 capsule 5    simvastatin (ZOCOR) 40 MG tablet Take 1 tablet by mouth  every evening 90 tablet 4    spironolactone (ALDACTONE) 25 MG tablet Take 1 tablet by mouth once a day 90 tablet 3    TRUETEST TEST STRIPS Strp 1 strip by Misc.(Non-Drug; Combo Route) route 4 (four) times daily. 150 strip 6     No current facility-administered medications for this visit.           Objective: /78 (BP Location: Right arm, Patient Position: Sitting, BP Method: Manual)  Pulse 72  Ht 5' (1.524 m)  Wt (!) 142 kg (313 lb)  BMI 61.13 kg/m2     Physical Exam   Constitutional: She is oriented to person, place, and time. She appears well-developed. No distress.   HENT:   Head: Normocephalic and atraumatic.   Eyes: Conjunctivae and EOM are normal.   Neck: Neck supple. Carotid bruit is not present.   Cardiovascular: Normal rate, regular rhythm and intact distal pulses.    Pulmonary/Chest: Effort normal and breath sounds normal.   Neurological: She is alert and oriented to person, place, and time.   Skin: Skin is warm and dry.   Psychiatric: She has a normal mood and affect. Her behavior is normal.        Lab Results   Component Value Date    WBC 5.68 01/26/2017    HGB 9.9 (L) 01/26/2017    HCT 29.7 (L) 01/26/2017     01/26/2017    CHOL 160 04/29/2016    TRIG 212 (H) 04/29/2016    HDL 33 (L) 04/29/2016    ALT 13 03/21/2017    AST 17 03/21/2017     03/21/2017    K 4.3 03/21/2017     03/21/2017    CREATININE 1.6 (H) 03/21/2017    BUN 33 (H) 03/21/2017    CO2 28 03/21/2017    TSH 1.388 04/29/2016    INR 1.0 03/10/2006    HGBA1C 9.4 (H) 03/21/2017                 Assessment:       1. PAD " (peripheral artery disease)    2. Dyslipidemia    3. Polyneuropathy associated with underlying disease    4. Essential hypertension           Plan:       1.  Stable peripheral vascular disease; continue current management.   2.  Stress DANIEL.  3.  Follow up in one year or earlier as needed.     Blair Rizo

## 2017-04-05 NOTE — MR AVS SNAPSHOT
[facility]  [address]  [facility] [address]  Phone: [phone]                  Annika Mac   2017 1:00 PM   Office Visit    Description:  Female : 1950   Provider:  Blair Rizo MD   Department:  [facility]           Reason for Visit     Peripheral Arterial Disease           Diagnoses this Visit        Comments    PAD (peripheral artery disease)    -  Primary     Dyslipidemia         Polyneuropathy associated with underlying disease         Essential hypertension                To Do List           Future Appointments        Provider Department Dept Phone    2017 11:40 AM Micaela Barrientos MD [facility] Med [facility] Rehab [phone]    2017 10:00 AM Enid Hines DPM [facility] - Podiatry [phone]    2017 10:50 AM LAB[facility] [place] [facility] [phone]    2017 10:55 AM SPECIMEN, MAIN CAMPUS [facility] [phone]    2017 1:00 PM Ramonita Toledo MD [facility] - Nephrology [phone]      Goals (5 Years of Data)              3/21/17    11/25/16    8/25/16    HEMOGLOBIN A1C < 7.5   9.4  9.1  10.8      Follow-Up and Disposition     Call patient with results Return in about 1 year (around 2018).      [facility] Care [facility] -  Assistance  Unless otherwise directed by your provider, please contact [facility], our nurse care line that is available for  assistance.     Registered nurses in the [facility] provide: appointment scheduling, clinical advisement, health education, and other advisory services.  Call: [phone] (toll free)               Medications           Message regarding Medications     Verify the changes and/or additions to your medication regime listed below are the same as discussed with your clinician today.  If any of these changes or additions are incorrect, please notify your  "healthcare provider.             Verify that the below list of medications is an accurate representation of the medications you are currently taking.  If none reported, the list may be blank. If incorrect, please contact your healthcare provider. Carry this list with you in case of emergency.           Current Medications     albuterol 90 mcg/actuation inhaler Inhale 2 puffs into the lungs every 6 (six) hours as needed for Wheezing.    allopurinol (ZYLOPRIM) 100 MG tablet Take 1 tablet by mouth once daily    aspirin 81 MG Chew Take 1 tablet (81 mg total) by mouth once daily.    azelastine (ASTELIN) 137 mcg (0.1 %) nasal spray 1 spray (137 mcg total) by Nasal route 2 (two) times daily.    BD INSULIN SYRINGE ULTRA-FINE 0.3 mL 30 gauge x 1/2" Syrg USE THREE TIMES DAILY FOR INSULIN INJECTIONS    blood-glucose meter kit Use as instructed, true test/result meter    colchicine 0.6 mg tablet Take 1 tablet (0.6 mg total) by mouth once daily.    duloxetine (CYMBALTA) 30 MG capsule Take 1 capsule (30 mg total) by mouth once daily.    fexofenadine (ALLEGRA) 180 MG tablet TAKE 1 TABLET BY MOUTH ONCE DAILY    fluticasone (FLONASE) 50 mcg/actuation nasal spray 2 sprays by Each Nare route once daily.    furosemide (LASIX) 20 MG tablet Take 1 tablet by mouth once a day    hydrocodone-acetaminophen 10-325mg (NORCO)  mg Tab Starting on Apr 16, 2017. Take 1 tablet by mouth every 8 (eight) hours as needed for Pain.    insulin regular hum U-500 conc 500 unit/mL (3 mL) InPn Inject 20-85 Units into the skin 3 (three) times daily before meals. Use 85 units with breakfast, 65 units with lunch, 20 units with dinner.    irbesartan (AVAPRO) 300 MG tablet Take 1 tablet (300 mg total) by mouth every evening.    labetalol (NORMODYNE) 300 MG tablet     lancets 31 gauge Misc 1 lancet by Misc.(Non-Drug; Combo Route) route 4 (four) times daily.    levocetirizine (XYZAL) 5 MG tablet Take 1 tablet (5 mg total) by mouth every evening.    " "montelukast (SINGULAIR) 10 mg tablet TAKE 1 TABLET(10 MG) BY MOUTH EVERY EVENING    pantoprazole (PROTONIX) 40 MG tablet Take 1 tablet (40 mg total) by mouth once daily.    pen needle, diabetic 32 gauge x 5/32" Ndle Use with multiple daily insulin injections    pregabalin (LYRICA) 100 MG capsule Take 1 capsule (100 mg total) by mouth 2 (two) times daily.    simvastatin (ZOCOR) 40 MG tablet Take 1 tablet by mouth  every evening    spironolactone (ALDACTONE) 25 MG tablet Take 1 tablet by mouth once a day    TRUETEST TEST STRIPS Strp 1 strip by Misc.(Non-Drug; Combo Route) route 4 (four) times daily.           Clinical Reference Information           Your Vitals Were     BP Pulse Height Weight BMI    112/78 (BP Location: Right arm, Patient Position: Sitting, BP Method: Manual) 72 5' (1.524 m) 142 kg (313 lb) 61.13 kg/m2      Blood Pressure          Most Recent Value    BP  112/78      Allergies as of 4/5/2017     Iodinated Contrast Media - Iv Dye      Immunizations Administered on Date of Encounter - 4/5/2017     None      Orders Placed During Today's Visit     Future Labs/Procedures Expected by Expires    CAR Ankle Brachial Indices W Stress  As directed 4/5/2018      Language Assistance Services     ATTENTION: Language assistance services are available, free of charge. Please call [phone].      ATENCIÓN: Si sneha dominguez, tiene a mckinnon disposición servicios gratuitos de asistencia lingüística. Llame al [phone].     CHÚ Ý: N?u b?n nói Ti?ng Vi?t, có các d?ch v? h? tr? ngôn ng? mi?n phí dành cho b?n. G?i s? [phone].         [facility] complies with applicable Federal civil rights laws and does not discriminate on the basis of race, color, national origin, age, disability, or sex.        "

## 2017-04-19 DIAGNOSIS — N18.30 CKD (CHRONIC KIDNEY DISEASE) STAGE 3, GFR 30-59 ML/MIN: Chronic | ICD-10-CM

## 2017-04-20 ENCOUNTER — TELEPHONE (OUTPATIENT)
Dept: PHARMACY | Facility: CLINIC | Age: 67
End: 2017-04-20

## 2017-04-20 RX ORDER — SPIRONOLACTONE 25 MG/1
TABLET ORAL
Qty: 90 TABLET | Refills: 3 | Status: SHIPPED | OUTPATIENT
Start: 2017-04-20 | End: 2017-11-02 | Stop reason: SDUPTHER

## 2017-04-20 RX ORDER — ALLOPURINOL 100 MG/1
TABLET ORAL
Qty: 30 TABLET | Refills: 3 | Status: SHIPPED | OUTPATIENT
Start: 2017-04-20 | End: 2017-07-19 | Stop reason: SDUPTHER

## 2017-04-27 DIAGNOSIS — T78.40XD ALLERGY, SUBSEQUENT ENCOUNTER: ICD-10-CM

## 2017-04-27 RX ORDER — MINERAL OIL
ENEMA (ML) RECTAL
Qty: 30 TABLET | Refills: 0 | Status: SHIPPED | OUTPATIENT
Start: 2017-04-27 | End: 2017-05-24 | Stop reason: SDUPTHER

## 2017-05-04 ENCOUNTER — LAB VISIT (OUTPATIENT)
Dept: LAB | Facility: HOSPITAL | Age: 67
End: 2017-05-04
Attending: INTERNAL MEDICINE
Payer: MEDICARE

## 2017-05-04 ENCOUNTER — CLINICAL SUPPORT (OUTPATIENT)
Dept: CARDIOLOGY | Facility: CLINIC | Age: 67
End: 2017-05-04
Payer: MEDICARE

## 2017-05-04 ENCOUNTER — OFFICE VISIT (OUTPATIENT)
Dept: HEMATOLOGY/ONCOLOGY | Facility: CLINIC | Age: 67
End: 2017-05-04
Payer: MEDICARE

## 2017-05-04 ENCOUNTER — OFFICE VISIT (OUTPATIENT)
Dept: PHYSICAL MEDICINE AND REHAB | Facility: CLINIC | Age: 67
End: 2017-05-04
Payer: MEDICARE

## 2017-05-04 VITALS
BODY MASS INDEX: 57.52 KG/M2 | HEART RATE: 83 BPM | SYSTOLIC BLOOD PRESSURE: 133 MMHG | HEIGHT: 60 IN | DIASTOLIC BLOOD PRESSURE: 45 MMHG | WEIGHT: 293 LBS

## 2017-05-04 DIAGNOSIS — M48.061 LUMBAR SPINAL STENOSIS: ICD-10-CM

## 2017-05-04 DIAGNOSIS — D64.9 ANEMIA, UNSPECIFIED TYPE: Chronic | ICD-10-CM

## 2017-05-04 DIAGNOSIS — M25.561 ACUTE PAIN OF RIGHT KNEE: ICD-10-CM

## 2017-05-04 DIAGNOSIS — M17.11 PRIMARY OSTEOARTHRITIS OF RIGHT KNEE: ICD-10-CM

## 2017-05-04 DIAGNOSIS — E66.01 MORBID OBESITY DUE TO EXCESS CALORIES: Chronic | ICD-10-CM

## 2017-05-04 DIAGNOSIS — I73.9 PAD (PERIPHERAL ARTERY DISEASE): Chronic | ICD-10-CM

## 2017-05-04 DIAGNOSIS — H26.493 PCO (POSTERIOR CAPSULAR OPACIFICATION), BILATERAL: ICD-10-CM

## 2017-05-04 DIAGNOSIS — G89.29 CHRONIC LOW BACK PAIN WITH SCIATICA, SCIATICA LATERALITY UNSPECIFIED, UNSPECIFIED BACK PAIN LATERALITY: Primary | ICD-10-CM

## 2017-05-04 DIAGNOSIS — M47.816 LUMBAR FACET ARTHROPATHY: ICD-10-CM

## 2017-05-04 DIAGNOSIS — M54.40 CHRONIC LOW BACK PAIN WITH SCIATICA, SCIATICA LATERALITY UNSPECIFIED, UNSPECIFIED BACK PAIN LATERALITY: Primary | ICD-10-CM

## 2017-05-04 DIAGNOSIS — E11.43 DIABETIC AUTONOMIC NEUROPATHY ASSOCIATED WITH TYPE 2 DIABETES MELLITUS: ICD-10-CM

## 2017-05-04 DIAGNOSIS — D64.9 ANEMIA, UNSPECIFIED TYPE: Primary | Chronic | ICD-10-CM

## 2017-05-04 LAB
BASOPHILS # BLD AUTO: 0.02 K/UL
BASOPHILS NFR BLD: 0.4 %
DIFFERENTIAL METHOD: ABNORMAL
EOSINOPHIL # BLD AUTO: 0.3 K/UL
EOSINOPHIL NFR BLD: 6.4 %
ERYTHROCYTE [DISTWIDTH] IN BLOOD BY AUTOMATED COUNT: 14.9 %
HCT VFR BLD AUTO: 32.3 %
HGB BLD-MCNC: 10.3 G/DL
LYMPHOCYTES # BLD AUTO: 1.6 K/UL
LYMPHOCYTES NFR BLD: 29 %
MCH RBC QN AUTO: 27.8 PG
MCHC RBC AUTO-ENTMCNC: 31.9 %
MCV RBC AUTO: 87 FL
MONOCYTES # BLD AUTO: 0.4 K/UL
MONOCYTES NFR BLD: 7.3 %
NEUTROPHILS # BLD AUTO: 3 K/UL
NEUTROPHILS NFR BLD: 56.7 %
PLATELET # BLD AUTO: 196 K/UL
PMV BLD AUTO: 9.5 FL
RBC # BLD AUTO: 3.71 M/UL
VASCULAR ANKLE BRACHIAL INDEX (ABI) LEFT: 1.22 (ref 0.9–1.2)
WBC # BLD AUTO: 5.34 K/UL

## 2017-05-04 PROCEDURE — 99999 PR PBB SHADOW E&M-EST. PATIENT-LVL III: CPT | Mod: PBBFAC,,, | Performed by: PHYSICAL MEDICINE & REHABILITATION

## 2017-05-04 PROCEDURE — 93922 UPR/L XTREMITY ART 2 LEVELS: CPT | Mod: PBBFAC | Performed by: INTERNAL MEDICINE

## 2017-05-04 PROCEDURE — 99213 OFFICE O/P EST LOW 20 MIN: CPT | Mod: S$PBB,,, | Performed by: INTERNAL MEDICINE

## 2017-05-04 PROCEDURE — 99214 OFFICE O/P EST MOD 30 MIN: CPT | Mod: S$PBB,,, | Performed by: PHYSICAL MEDICINE & REHABILITATION

## 2017-05-04 PROCEDURE — 99999 PR PBB SHADOW E&M-EST. PATIENT-LVL I: CPT | Mod: 25,PBBFAC,, | Performed by: INTERNAL MEDICINE

## 2017-05-04 RX ORDER — PREGABALIN 100 MG/1
100 CAPSULE ORAL 2 TIMES DAILY
Qty: 60 CAPSULE | Refills: 5 | Status: SHIPPED | OUTPATIENT
Start: 2017-05-04 | End: 2017-06-03

## 2017-05-04 RX ORDER — DULOXETIN HYDROCHLORIDE 30 MG/1
30 CAPSULE, DELAYED RELEASE ORAL DAILY
Qty: 90 CAPSULE | Refills: 3 | Status: SHIPPED | OUTPATIENT
Start: 2017-05-04 | End: 2017-11-27 | Stop reason: SDUPTHER

## 2017-05-04 RX ORDER — HYDROCODONE BITARTRATE AND ACETAMINOPHEN 10; 325 MG/1; MG/1
1 TABLET ORAL EVERY 8 HOURS PRN
Qty: 90 TABLET | Refills: 0 | Status: SHIPPED | OUTPATIENT
Start: 2017-07-04 | End: 2017-08-03

## 2017-05-04 RX ORDER — HYDROCODONE BITARTRATE AND ACETAMINOPHEN 10; 325 MG/1; MG/1
1 TABLET ORAL EVERY 8 HOURS PRN
Qty: 90 TABLET | Refills: 0 | Status: SHIPPED | OUTPATIENT
Start: 2017-06-04 | End: 2017-07-04

## 2017-05-04 RX ORDER — HYDROCODONE BITARTRATE AND ACETAMINOPHEN 10; 325 MG/1; MG/1
1 TABLET ORAL EVERY 8 HOURS PRN
Qty: 90 TABLET | Refills: 0 | Status: SHIPPED | OUTPATIENT
Start: 2017-05-04 | End: 2017-08-04 | Stop reason: SDUPTHER

## 2017-05-04 RX ORDER — PREGABALIN 100 MG/1
100 CAPSULE ORAL 2 TIMES DAILY
Qty: 180 CAPSULE | Refills: 1 | Status: SHIPPED | OUTPATIENT
Start: 2017-05-04 | End: 2017-11-21 | Stop reason: DRUGHIGH

## 2017-05-04 NOTE — MR AVS SNAPSHOT
[facility]  [address]  [facility] [address]  Phone: [phone]                  Annika Mac   2017 11:40 AM   Office Visit    Description:  Female : 1950   Provider:  Micaela Barrientos MD   [facility]           Reason for Visit     Back Pain     Leg Pain           Diagnoses this Visit        Comments    Chronic low back pain with sciatica, sciatica laterality unspecified, unspecified back pain laterality    -  Primary     Lumbar spinal stenosis         Diabetic autonomic neuropathy associated with type 2 diabetes mellitus         Lumbar facet arthropathy         Primary osteoarthritis of right knee         PCO (posterior capsular opacification), bilateral         Acute pain of right knee                To Do List           Future Appointments        Provider Department Dept Phone    2017 11:40 AM MD [facility] [phone]    2017 1:00 PM VASCULAR, CARDIOLOGY [facility] Vascular Cardiology [phone]    2017 10:00 AM Enid Hines DPM [facility] Podiatry [phone]    2017 10:50 AM LAB, [place]POINT[place] [facility] [phone]    2017 10:55 AM SPECIMEN, MAIN CAMPUS [facility] [phone]      Goals (5 Years of Data)              3/21/17    11/25/16    8/25/16    HEMOGLOBIN A1C < 7.5   9.4  9.1  10.8      Follow-Up and Disposition     Return in about 3 months (around 2017).       These Medications        Disp Refills Start End    pregabalin (LYRICA) 100 MG capsule 180 capsule 1 2017    Take 1 capsule (100 mg total) by mouth 2 (two) times daily. - Oral    Pharmacy: [facility] - [place], [place] - [address] [place] [address] #: [phone]       pregabalin (LYRICA) 100 MG capsule 60 capsule 5 2017 6/3/2017    Take 1 capsule (100 mg total) by mouth 2 (two) times daily. - Oral    Pharmacy:  [facility] - [address] #: [phone]       duloxetine (CYMBALTA) 30 MG capsule 90 capsule 3 5/4/2017 8/2/2017    Take 1 capsule (30 mg total) by mouth once daily. - Oral    Pharmacy: [facility] - [address] Ph #: [phone]       hydrocodone-acetaminophen 10-325mg (NORCO)  mg Tab 90 tablet 0 5/4/2017 6/3/2017    Take 1 tablet by mouth every 8 (eight) hours as needed for Pain. - Oral    Pharmacy: [facility] - [address] Ph #: [phone]       hydrocodone-acetaminophen 10-325mg (NORCO)  mg Tab 90 tablet 0 6/4/2017 7/4/2017    Take 1 tablet by mouth every 8 (eight) hours as needed for Pain. - Oral    Pharmacy: [facility] - [address] Ph #: [phone]       hydrocodone-acetaminophen 10-325mg (NORCO)  mg Tab 90 tablet 0 7/4/2017 8/3/2017    Take 1 tablet by mouth every 8 (eight) hours as needed for Pain. - Oral    Pharmacy: [facility] - [address] #: [phone]         [facility]     [facility] - 24/7 Assistance  Unless otherwise directed by your provider, please contact [facility], our nurse care line that is available for 24/7 assistance.     Registered nurses in the [facility] provide: appointment scheduling, clinical advisement, health education, and other advisory services.  Call: [phone] (toll free)               Medications           Message regarding Medications     Verify the changes and/or additions to your medication regime listed below are the same as discussed with your clinician today.  If any of these changes or additions are incorrect, please notify your healthcare provider.        START taking these NEW medications        Refills    hydrocodone-acetaminophen 10-325mg (NORCO)  mg Tab 0    Starting on: 6/4/2017    Sig: Take 1 tablet by  "mouth every 8 (eight) hours as needed for Pain.    Class: Print    Route: Oral    hydrocodone-acetaminophen 10-325mg (NORCO)  mg Tab 0    Starting on: 7/4/2017    Sig: Take 1 tablet by mouth every 8 (eight) hours as needed for Pain.    Class: Print    Route: Oral           Verify that the below list of medications is an accurate representation of the medications you are currently taking.  If none reported, the list may be blank. If incorrect, please contact your healthcare provider. Carry this list with you in case of emergency.           Current Medications     albuterol 90 mcg/actuation inhaler Inhale 2 puffs into the lungs every 6 (six) hours as needed for Wheezing.    allopurinol (ZYLOPRIM) 100 MG tablet Take 1 tablet by mouth once daily    aspirin 81 MG Chew Take 1 tablet (81 mg total) by mouth once daily.    azelastine (ASTELIN) 137 mcg (0.1 %) nasal spray 1 spray (137 mcg total) by Nasal route 2 (two) times daily.    BD INSULIN SYRINGE ULTRA-FINE 0.3 mL 30 gauge x 1/2" Syrg USE THREE TIMES DAILY FOR INSULIN INJECTIONS    blood-glucose meter kit Use as instructed, true test/result meter    colchicine 0.6 mg tablet Take 1 tablet (0.6 mg total) by mouth once daily.    duloxetine (CYMBALTA) 30 MG capsule Take 1 capsule (30 mg total) by mouth once daily.    fexofenadine (ALLEGRA) 180 MG tablet TAKE 1 TABLET BY MOUTH ONCE DAILY    fluticasone (FLONASE) 50 mcg/actuation nasal spray 2 sprays by Each Nare route once daily.    furosemide (LASIX) 20 MG tablet Take 1 tablet by mouth once a day    hydrocodone-acetaminophen 10-325mg (NORCO)  mg Tab Take 1 tablet by mouth every 8 (eight) hours as needed for Pain.    hydrocodone-acetaminophen 10-325mg (NORCO)  mg Tab Starting on Jun 04, 2017. Take 1 tablet by mouth every 8 (eight) hours as needed for Pain.    hydrocodone-acetaminophen 10-325mg (NORCO)  mg Tab Starting on Jul 04, 2017. Take 1 tablet by mouth every 8 (eight) hours as needed for Pain.    " "insulin regular hum U-500 conc 500 unit/mL (3 mL) InPn Inject 20-85 Units into the skin 3 (three) times daily before meals. Use 85 units with breakfast, 65 units with lunch, 20 units with dinner.    irbesartan (AVAPRO) 300 MG tablet Take 1 tablet (300 mg total) by mouth every evening.    labetalol (NORMODYNE) 300 MG tablet     lancets 31 gauge Misc 1 lancet by Misc.(Non-Drug; Combo Route) route 4 (four) times daily.    levocetirizine (XYZAL) 5 MG tablet Take 1 tablet (5 mg total) by mouth every evening.    montelukast (SINGULAIR) 10 mg tablet TAKE 1 TABLET(10 MG) BY MOUTH EVERY EVENING    pantoprazole (PROTONIX) 40 MG tablet Take 1 tablet (40 mg total) by mouth once daily.    pen needle, diabetic 32 gauge x 5/32" Ndle Use with multiple daily insulin injections    pregabalin (LYRICA) 100 MG capsule Take 1 capsule (100 mg total) by mouth 2 (two) times daily.    pregabalin (LYRICA) 100 MG capsule Take 1 capsule (100 mg total) by mouth 2 (two) times daily.    simvastatin (ZOCOR) 40 MG tablet Take 1 tablet by mouth  every evening    spironolactone (ALDACTONE) 25 MG tablet Take 1 tablet by mouth once a day    TRUETEST TEST STRIPS Strp 1 strip by Misc.(Non-Drug; Combo Route) route 4 (four) times daily.           Clinical Reference Information           Your Vitals Were     BP Pulse Height Weight BMI    133/45 83 5' (1.524 m) 143.9 kg (317 lb 5.6 oz) 61.98 kg/m2      Blood Pressure          Most Recent Value    BP  (!)  133/45      Allergies as of 5/4/2017     Iodinated Contrast Media - Oral And Iv Dye      Immunizations Administered on Date of Encounter - 5/4/2017     None      Language Assistance Services     ATTENTION: Language assistance services are available, free of charge. Please call [phone].      ATENCIÓN: Si sneha dominguez, tiene a mckinnon disposición servicios gratuitos de asistencia lingüística. Llame al [phone].     CHÚ Ý: N?u b?n nói Ti?ng Vi?t, có các d?ch v? h? tr? ngôn ng? mi?n phí dành cho b?n. " G?i s? [phone].         [facility] complies with applicable Federal civil rights laws and does not discriminate on the basis of race, color, national origin, age, disability, or sex.

## 2017-05-04 NOTE — PROGRESS NOTES
Subjective:       Patient ID: Annika Mac is a 66 y.o. female.    Chief Complaint: No chief complaint on file.    Anemia        Mrs. Mac presents today for followup for her longstanding anemia. Briefly, she is a morbidly obese 64-year-old -American female with longstanding anemia, whom I have followed expectantly for at least the last six years. She is here today for her scheduled followup appointment.   Her CBC from shows a WBC count is 5,300 /mm3, Mg 10.3 gr/dl, Ht 32.3 %, MCV 87 and platelet count of 186,000 /mm3.   Three stool samples that she submitted today were again negative for occult blood.    Review of Systems  Overall she feels OK. She again complains of her long standing arthritis, and is having difficulty ambulating. She denies any anxiety, depression, easy bruising, fevers, chills, night sweats, weight loss, nausea, vomiting, diarrhea, diplopia, blurred vision, epistaxi,s hematuria, or headaches.      Objective:      Physical Exam  GENERAL: She is alert, oriented to time, place, person, pleasant, well nourished, in no acute physical distress.   VITAL SIGNS: Reviewed.   HEENT: Normal. There are no nasal, oral, lip, gingival, auricular, lid, conjunctival lesions. Pupils are equal, reactive to light and   accommodation and extraocular muscle movements are intact. Mucosae are moist and pink, and there is no thrush.   NECK: Supple without JVD, adenopathy, or thyromegaly.   LUNGS: Clear to auscultation without wheezing, rales, or rhonchi.   CARDIOVASCULAR: Reveals an S1, S2, no murmurs, no rubs, no gallops.   ABDOMEN: Obese, soft, nontender, without organomegaly. Bowel sounds are present. A median abdominal scar is seen extending from the umbilicus to the symphysis pubis.   EXTREMITIES: No cyanosis or clubbing. There is 1(+) edema at the ankle level bilaterally.   SKIN: Does not have petechiae, rashes, induration, or ecchymoses.   NEUROLOGIC: Motor function is 5/5, DTRs are 0 to 1+ bilaterally,  symmetrical, and cranial nerves within normal limits.   LYMPHATIC: There is no cervical, axillary, or supraclavicular adenopathy.   Assessment:       1. Anemia, chronic, stable        Plan:         I have asked her to return to see me in 6 months, and she will submit three stool samples at that time.  Her questions were answered to her satisfaction.

## 2017-05-04 NOTE — PROGRESS NOTES
"Subjective:       Patient ID: Annika Mac is a 66 y.o. female.    Chief Complaint: Back Pain and Leg Pain    Back Pain   Associated symptoms include leg pain. Pertinent negatives include no abdominal pain, chest pain, fever, numbness or weakness. Headaches:     Leg Pain    Pertinent negatives include no numbness.   Knee Pain    Pertinent negatives include no numbness.   Patient is 65 y/o female , who returns to clinic for chronic back pain.   LCV 02/16/17.  Today complains about back pain, leg pain, and Rt knee pain.   Current knee pain is 6-7,wprst pan is 10/10, best is 3-4 with meds.  Current back pain is  3,  worst pain are 10/10, best is 3-4 with meds.  Pain  Is present at all time, accross lower back in midline of tail bone.    Back pain is more dull pain, and leg pain is more shooting pain art the top of thighs,   and bellow the knee.   Pain is present daytime, and radiates to both legs on front of legs.  Cannot walk <  ft using a cane, stands straight < 5 minutes.   She has to lean forward even with cane or during walking.   Back pain increases, and leg become weak.   She has diabetic neuropathy in both feet, top and bottom, tingling pain.  Does not have "charilie horses".   Sitting down helps a little bit, and lying down  ( on side, or stomach).   She failed Neurontin 300 mg  for maybe 6 yrs, and takes 3 pills a day, that does not help much.   And , Gabapentin is eventually changed to  Lyrica  100 mg that she takes daily  ( approved by insurance , and pain in hands is gone),  She has been getting NAYLA since 2011, by Dr. Quinn and Geronimo with NAYLA, with some pain relief.   She had a second B/l L5/S1 TF NAYLA on 08/18/16, and she reported that helped a lot > 80% and lasted for only 2 weeks.   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   She has SC, manual WC, and RW .  Here for follow up, re evaluation and treatment.     Past Medical History:   Diagnosis Date    Allergy     Anemia 2012    Arthritis     CHF (congestive heart failure)     CKD (chronic kidney disease) stage 3, GFR 30-59 ml/min 2012    Clotting disorder     Deep vein thrombophlebitis of left leg 2012    Degenerative disc disease     Diabetic peripheral neuropathy associated with type 2 diabetes mellitus 2012    GERD (gastroesophageal reflux disease)     HTN (hypertension) 2012    Hyperlipidemia 2012    Lead-induced gout of ankle or foot     right going on three weeks    Nonproliferative diabetic retinopathy of right eye 2012    Obstructive sleep apnea 2012    Osteoporosis     Proliferative diabetic retinopathy of left eye 2012    Stroke 2003    Type 2 diabetes mellitus with diabetic polyneuropathy 2012    Ulcer        Past Surgical History:   Procedure Laterality Date    CATARACT EXTRACTION W/  INTRAOCULAR LENS IMPLANT Left 3/2002    left     CATARACT EXTRACTION W/  INTRAOCULAR LENS IMPLANT Right     OD dr. olivares     SECTION      CYST REMOVAL  2011    left side of face    EYE SURGERY Bilateral 2012    eye implants    GANGLION CYST EXCISION  2007    Right wrist    Pan Retinal Photocoagulation Bilateral     Dr. Monterroso (Proliferative Diabetic Retinopathy)    TOTAL ABDOMINAL HYSTERECTOMY  1982    TOTAL KNEE ARTHROPLASTY  2006    left    TRIGGER FINGER RELEASE  2009       Family History   Problem Relation Age of Onset    Diabetes Mother     Hypertension Mother     Heart disease Father     Diabetes Sister     Thyroid disease Brother     Amblyopia Neg Hx     Blindness Neg Hx     Glaucoma Neg Hx     Macular degeneration Neg Hx     Retinal detachment Neg Hx     Strabismus Neg Hx        Social  "History     Social History    Marital status:      Spouse name: Heber    Number of children: 3    Years of education: N/A     Social History Main Topics    Smoking status: Never Smoker    Smokeless tobacco: Never Used    Alcohol use No    Drug use: No    Sexual activity: Yes     Partners: Male     Other Topics Concern    None     Social History Narrative    , lives with family; 3 children, 2 grandchildren       Current Outpatient Prescriptions   Medication Sig Dispense Refill    albuterol 90 mcg/actuation inhaler Inhale 2 puffs into the lungs every 6 (six) hours as needed for Wheezing. 1 Inhaler 0    allopurinol (ZYLOPRIM) 100 MG tablet Take 1 tablet by mouth once daily 30 tablet 3    aspirin 81 MG Chew Take 1 tablet (81 mg total) by mouth once daily.  0    azelastine (ASTELIN) 137 mcg (0.1 %) nasal spray 1 spray (137 mcg total) by Nasal route 2 (two) times daily. 30 mL 11    BD INSULIN SYRINGE ULTRA-FINE 0.3 mL 30 gauge x 1/2" Syrg USE THREE TIMES DAILY FOR INSULIN INJECTIONS 300 each 2    blood-glucose meter kit Use as instructed, true test/result meter 1 each 0    colchicine 0.6 mg tablet Take 1 tablet (0.6 mg total) by mouth once daily. 30 tablet 3    duloxetine (CYMBALTA) 30 MG capsule Take 1 capsule (30 mg total) by mouth once daily. 90 capsule 3    fexofenadine (ALLEGRA) 180 MG tablet TAKE 1 TABLET BY MOUTH ONCE DAILY 30 tablet 0    fluticasone (FLONASE) 50 mcg/actuation nasal spray 2 sprays by Each Nare route once daily. 48 g 6    furosemide (LASIX) 20 MG tablet Take 1 tablet by mouth once a day 90 tablet 3    hydrocodone-acetaminophen 10-325mg (NORCO)  mg Tab Take 1 tablet by mouth every 8 (eight) hours as needed for Pain. 90 tablet 0    [START ON 6/4/2017] hydrocodone-acetaminophen 10-325mg (NORCO)  mg Tab Take 1 tablet by mouth every 8 (eight) hours as needed for Pain. 90 tablet 0    [START ON 7/4/2017] hydrocodone-acetaminophen 10-325mg (NORCO)  mg " "Tab Take 1 tablet by mouth every 8 (eight) hours as needed for Pain. 90 tablet 0    insulin regular hum U-500 conc 500 unit/mL (3 mL) InPn Inject 20-85 Units into the skin 3 (three) times daily before meals. Use 85 units with breakfast, 65 units with lunch, 20 units with dinner. 11 Syringe 3    irbesartan (AVAPRO) 300 MG tablet Take 1 tablet (300 mg total) by mouth every evening. 90 tablet 3    labetalol (NORMODYNE) 300 MG tablet       lancets 31 gauge Misc 1 lancet by Misc.(Non-Drug; Combo Route) route 4 (four) times daily. 150 each 6    levocetirizine (XYZAL) 5 MG tablet Take 1 tablet (5 mg total) by mouth every evening. 7 tablet 0    montelukast (SINGULAIR) 10 mg tablet TAKE 1 TABLET(10 MG) BY MOUTH EVERY EVENING 90 tablet 0    pantoprazole (PROTONIX) 40 MG tablet Take 1 tablet (40 mg total) by mouth once daily. 30 tablet 6    pen needle, diabetic 32 gauge x 5/32" Ndle Use with multiple daily insulin injections 200 each 12    pregabalin (LYRICA) 100 MG capsule Take 1 capsule (100 mg total) by mouth 2 (two) times daily. 180 capsule 1    pregabalin (LYRICA) 100 MG capsule Take 1 capsule (100 mg total) by mouth 2 (two) times daily. 60 capsule 5    simvastatin (ZOCOR) 40 MG tablet Take 1 tablet by mouth  every evening 90 tablet 4    spironolactone (ALDACTONE) 25 MG tablet Take 1 tablet by mouth once a day 90 tablet 3    TRUETEST TEST STRIPS Strp 1 strip by Misc.(Non-Drug; Combo Route) route 4 (four) times daily. 150 strip 6     No current facility-administered medications for this visit.        Review of patient's allergies indicates:   Allergen Reactions    Iodinated contrast media - oral and iv dye Rash         Review of Systems   Constitutional: Negative.  Negative for appetite change, chills, fatigue, fever and unexpected weight change.   HENT: Negative.  Negative for drooling, trouble swallowing and voice change.    Eyes: Negative.  Negative for pain and visual disturbance.   Respiratory: Negative. "  Negative for shortness of breath and wheezing.    Cardiovascular: Negative.  Negative for chest pain and palpitations.   Gastrointestinal: Negative.  Negative for abdominal distention, abdominal pain, constipation and diarrhea.   Genitourinary: Negative.  Negative for difficulty urinating.   Musculoskeletal: Positive for back pain. Negative for arthralgias, gait problem, joint swelling, myalgias and neck stiffness.               Skin: Negative.  Negative for color change and rash.   Neurological: Negative.  Negative for dizziness, facial asymmetry, speech difficulty, weakness, light-headedness and numbness. Headaches:     Hematological: Negative for adenopathy.   Psychiatric/Behavioral: Negative.  Negative for behavioral problems, confusion and sleep disturbance. The patient is not nervous/anxious.            Objective:      Physical Exam      GENERAL: The patient is alert, oriented, pleasant.   HEENT; PERRLA  NECK: supple   MUSCULOSKELETAL:   Gait - slow james , on wide basis, using RW.   Cervical spine :  Minimally decreased AROM in cervical spine, flexion to 60, extension to  0,,   side bending and rotation  to 30-35 degrees without  Pain ,   No paravertebral cervical musculature tenderness    No  tenderness in upper trapezius muscles    Lumbar spine, full range of motion in all planes, flexion to 90 degrees , ext. 0.  Side bending and rotation to 35-40 degrees.   Straight leg raising Negative bilaterally.   Full range of motion in all joints x4 extremities, except in RT knee, that is very painful at pattellar lower pole/border.  Appears high riding patella, hat is maybe  lateray displaced,  Muscle strength 5/5 throughout x4 extremities.   No  joint laxity throughout x4 extremities.   NEUROLOGIC: Cranial nerves II through XII intact.   Deep tendon reflexes is normal, +2 in the upper and lower extremities bilaterally.   Muscle tone is normal.   Sensory is intact to light touch and pinprick throughout x4  extremities.     MRI of Lumbar spine showed:  T12-L1:  There is no focal disc herniation.  No significant spinal canal narrowing.    No significant neural foraminal narrowing.  L1-2:  There is no focal disc herniation.  No significant spinal canal narrowing.    No significant neural foraminal narrowing.  L2-3:  There is no focal disc herniation.  No significant spinal canal narrowing.    No significant neural foraminal narrowing.  L3-4:  There is a minimal circumferential disk bulge and mild bilateral facet arthropathy which results in no significant spinal canal or neural foraminal narrowing.  L4-5:    There is circumferential disk bulge (eccentric to the left), moderate bilateral hypertrophic facet arthropathy, and ligamentum flavum thickening which results in mild spinal canal narrowing and no significant neural foraminal narrowing.      L5-S1:  There is circumferential disk bulge and severe hypertrophic facet arthropathy   with ligamentum flavum hypertrophy which results in mild spinal canal narrowing, moderate left neural foraminal narrowing, and mild right neural foraminal narrowing.i  Impression:   multilevel degenerative changes of the lumbar spine consisting of circumferential disk bulges, hypertrophic facet arthropathy, and ligamentum flavum hypertrophy   which result in mild spinal canal narrowing at L4-5  and L5-S1 as well as moderate left and mild right neural foraminal narrowing at L5-S1.      Assessment:       1. Lumbar spinal stenosis    2. Chronic low back pain with sciatica, sciatica laterality unspecified, unspecified back pain laterality    3. Diabetic autonomic neuropathy associated with type 2 diabetes mellitus    4. Lumbar facet arthropathy    5. Primary osteoarthritis of right knee      Plan:       Chronic low back pain with sciatica, sciatica laterality unspecified, unspecified back pain laterality  -     pregabalin (LYRICA) 100 MG capsule; Take 1 capsule (100 mg total) by mouth 2 (two)  times daily.  Dispense: 180 capsule; Refill: 1  -     pregabalin (LYRICA) 100 MG capsule; Take 1 capsule (100 mg total) by mouth 2 (two) times daily.  Dispense: 60 capsule; Refill: 5  -     duloxetine (CYMBALTA) 30 MG capsule; Take 1 capsule (30 mg total) by mouth once daily.  Dispense: 90 capsule; Refill: 3  -     hydrocodone-acetaminophen 10-325mg (NORCO)  mg Tab; Take 1 tablet by mouth every 8 (eight) hours as needed for Pain.  Dispense: 90 tablet; Refill: 0  -     hydrocodone-acetaminophen 10-325mg (NORCO)  mg Tab; Take 1 tablet by mouth every 8 (eight) hours as needed for Pain.  Dispense: 90 tablet; Refill: 0  -     hydrocodone-acetaminophen 10-325mg (NORCO)  mg Tab; Take 1 tablet by mouth every 8 (eight) hours as needed for Pain.  Dispense: 90 tablet; Refill: 0    Lumbar spinal stenosis  -     pregabalin (LYRICA) 100 MG capsule; Take 1 capsule (100 mg total) by mouth 2 (two) times daily.  Dispense: 180 capsule; Refill: 1  -     pregabalin (LYRICA) 100 MG capsule; Take 1 capsule (100 mg total) by mouth 2 (two) times daily.  Dispense: 60 capsule; Refill: 5  -     duloxetine (CYMBALTA) 30 MG capsule; Take 1 capsule (30 mg total) by mouth once daily.  Dispense: 90 capsule; Refill: 3  -     hydrocodone-acetaminophen 10-325mg (NORCO)  mg Tab; Take 1 tablet by mouth every 8 (eight) hours as needed for Pain.  Dispense: 90 tablet; Refill: 0  -     hydrocodone-acetaminophen 10-325mg (NORCO)  mg Tab; Take 1 tablet by mouth every 8 (eight) hours as needed for Pain.  Dispense: 90 tablet; Refill: 0  -     hydrocodone-acetaminophen 10-325mg (NORCO)  mg Tab; Take 1 tablet by mouth every 8 (eight) hours as needed for Pain.  Dispense: 90 tablet; Refill: 0    Diabetic autonomic neuropathy associated with type 2 diabetes mellitus  -     pregabalin (LYRICA) 100 MG capsule; Take 1 capsule (100 mg total) by mouth 2 (two) times daily.  Dispense: 180 capsule; Refill: 1  -     pregabalin (LYRICA) 100  MG capsule; Take 1 capsule (100 mg total) by mouth 2 (two) times daily.  Dispense: 60 capsule; Refill: 5  -     duloxetine (CYMBALTA) 30 MG capsule; Take 1 capsule (30 mg total) by mouth once daily.  Dispense: 90 capsule; Refill: 3  -     hydrocodone-acetaminophen 10-325mg (NORCO)  mg Tab; Take 1 tablet by mouth every 8 (eight) hours as needed for Pain.  Dispense: 90 tablet; Refill: 0  -     hydrocodone-acetaminophen 10-325mg (NORCO)  mg Tab; Take 1 tablet by mouth every 8 (eight) hours as needed for Pain.  Dispense: 90 tablet; Refill: 0  -     hydrocodone-acetaminophen 10-325mg (NORCO)  mg Tab; Take 1 tablet by mouth every 8 (eight) hours as needed for Pain.  Dispense: 90 tablet; Refill: 0    Lumbar facet arthropathy  -     pregabalin (LYRICA) 100 MG capsule; Take 1 capsule (100 mg total) by mouth 2 (two) times daily.  Dispense: 180 capsule; Refill: 1  -     pregabalin (LYRICA) 100 MG capsule; Take 1 capsule (100 mg total) by mouth 2 (two) times daily.  Dispense: 60 capsule; Refill: 5  -     duloxetine (CYMBALTA) 30 MG capsule; Take 1 capsule (30 mg total) by mouth once daily.  Dispense: 90 capsule; Refill: 3  -     hydrocodone-acetaminophen 10-325mg (NORCO)  mg Tab; Take 1 tablet by mouth every 8 (eight) hours as needed for Pain.  Dispense: 90 tablet; Refill: 0  -     hydrocodone-acetaminophen 10-325mg (NORCO)  mg Tab; Take 1 tablet by mouth every 8 (eight) hours as needed for Pain.  Dispense: 90 tablet; Refill: 0  -     hydrocodone-acetaminophen 10-325mg (NORCO)  mg Tab; Take 1 tablet by mouth every 8 (eight) hours as needed for Pain.  Dispense: 90 tablet; Refill: 0    Primary osteoarthritis of right knee  -     pregabalin (LYRICA) 100 MG capsule; Take 1 capsule (100 mg total) by mouth 2 (two) times daily.  Dispense: 60 capsule; Refill: 5  -     duloxetine (CYMBALTA) 30 MG capsule; Take 1 capsule (30 mg total) by mouth once daily.  Dispense: 90 capsule; Refill: 3  -      hydrocodone-acetaminophen 10-325mg (NORCO)  mg Tab; Take 1 tablet by mouth every 8 (eight) hours as needed for Pain.  Dispense: 90 tablet; Refill: 0  -     hydrocodone-acetaminophen 10-325mg (NORCO)  mg Tab; Take 1 tablet by mouth every 8 (eight) hours as needed for Pain.  Dispense: 90 tablet; Refill: 0  -     hydrocodone-acetaminophen 10-325mg (NORCO)  mg Tab; Take 1 tablet by mouth every 8 (eight) hours as needed for Pain.  Dispense: 90 tablet; Refill: 0    PCO (posterior capsular opacification), bilateral  -     pregabalin (LYRICA) 100 MG capsule; Take 1 capsule (100 mg total) by mouth 2 (two) times daily.  Dispense: 180 capsule; Refill: 1  -     pregabalin (LYRICA) 100 MG capsule; Take 1 capsule (100 mg total) by mouth 2 (two) times daily.  Dispense: 60 capsule; Refill: 5  -     duloxetine (CYMBALTA) 30 MG capsule; Take 1 capsule (30 mg total) by mouth once daily.  Dispense: 90 capsule; Refill: 3  -     hydrocodone-acetaminophen 10-325mg (NORCO)  mg Tab; Take 1 tablet by mouth every 8 (eight) hours as needed for Pain.  Dispense: 90 tablet; Refill: 0  -     hydrocodone-acetaminophen 10-325mg (NORCO)  mg Tab; Take 1 tablet by mouth every 8 (eight) hours as needed for Pain.  Dispense: 90 tablet; Refill: 0  -     hydrocodone-acetaminophen 10-325mg (NORCO)  mg Tab; Take 1 tablet by mouth every 8 (eight) hours as needed for Pain.  Dispense: 90 tablet; Refill: 0    Acute pain of right knee  -     pregabalin (LYRICA) 100 MG capsule; Take 1 capsule (100 mg total) by mouth 2 (two) times daily.  Dispense: 180 capsule; Refill: 1  -     pregabalin (LYRICA) 100 MG capsule; Take 1 capsule (100 mg total) by mouth 2 (two) times daily.  Dispense: 60 capsule; Refill: 5  -     duloxetine (CYMBALTA) 30 MG capsule; Take 1 capsule (30 mg total) by mouth once daily.  Dispense: 90 capsule; Refill: 3  -     hydrocodone-acetaminophen 10-325mg (NORCO)  mg Tab; Take 1 tablet by mouth every 8 (eight)  hours as needed for Pain.  Dispense: 90 tablet; Refill: 0  -     hydrocodone-acetaminophen 10-325mg (NORCO)  mg Tab; Take 1 tablet by mouth every 8 (eight) hours as needed for Pain.  Dispense: 90 tablet; Refill: 0  -     hydrocodone-acetaminophen 10-325mg (NORCO)  mg Tab; Take 1 tablet by mouth every 8 (eight) hours as needed for Pain.  Dispense: 90 tablet; Refill: 0      Patient with chronic low back pain, secondary to lumbar spondylosis, possible radiculopathy, cx with morbid obesity.   Also with gait instability sec. To sever DJD of Rt knee.    1. Will increase  Hydrocodone to 10/325 mg , and Gabapentin is changed to  Lyrica  100 mg BID ( approved by insurance, and pain in hands is gone),   Add Cymbalta.  2. Will resume  PT, external referral given closer to home.  3. Rt knee pain , xray of knee showed severe DJD.  Knee brace for stability,and proprioception.  Seen by Betsey Paige PA ,and dr. [facility], who recommended no surgery in this high risk pt, weight loss in recommended before re evaluation.    RTC in 3 months.     Total time spent face to face with patient was 25 minutes.   More than 50% of that time was spent in counseling on diagnosis , prognosis and treatment options.   I also caunsel patient  on common and most usual side effect of prescribed medications.   Risk and benefits of opiates, possible risk of developing opiate dependence and tolerance, need of strict compliance with prescribed medications.  I reviewed Primary care , and other specialty's notes to better coordinate patient's  care.   All questions were answered, and patient voiced understanding.                                                                                                                                                                          .

## 2017-05-05 ENCOUNTER — TELEPHONE (OUTPATIENT)
Dept: CARDIOLOGY | Facility: CLINIC | Age: 67
End: 2017-05-05

## 2017-05-05 NOTE — TELEPHONE ENCOUNTER
----- Message from Blair Rizo MD sent at 5/5/2017  4:35 PM CDT -----  DANIEL shows no changes    Follow up in 1 year.

## 2017-05-12 DIAGNOSIS — I10 ESSENTIAL HYPERTENSION: ICD-10-CM

## 2017-05-12 DIAGNOSIS — K21.9 GASTROESOPHAGEAL REFLUX DISEASE, ESOPHAGITIS PRESENCE NOT SPECIFIED: ICD-10-CM

## 2017-05-12 RX ORDER — BIOTIN 1 MG
1 TABLET ORAL 4 TIMES DAILY
Qty: 150 STRIP | Refills: 6 | Status: SHIPPED | OUTPATIENT
Start: 2017-05-12 | End: 2017-05-23 | Stop reason: SDUPTHER

## 2017-05-12 RX ORDER — IRBESARTAN 300 MG/1
300 TABLET ORAL NIGHTLY
Qty: 90 TABLET | Refills: 3 | Status: SHIPPED | OUTPATIENT
Start: 2017-05-12 | End: 2017-06-06 | Stop reason: SDUPTHER

## 2017-05-12 RX ORDER — PANTOPRAZOLE SODIUM 40 MG/1
40 TABLET, DELAYED RELEASE ORAL DAILY
Qty: 90 TABLET | Refills: 3 | Status: SHIPPED | OUTPATIENT
Start: 2017-05-12 | End: 2017-06-06 | Stop reason: SDUPTHER

## 2017-05-12 RX ORDER — INSULIN PUMP SYRINGE, 3 ML
EACH MISCELLANEOUS
Qty: 1 EACH | Refills: 0 | Status: SHIPPED | OUTPATIENT
Start: 2017-05-12 | End: 2019-05-27 | Stop reason: SDUPTHER

## 2017-05-12 NOTE — TELEPHONE ENCOUNTER
----- Message from Ivelisse Cee sent at 5/12/2017 12:00 PM CDT -----  Contact: pt [phone]  Pt need a refill on her Protonix 40 mg and Avapro 300 mg 90 day supply and please call in to [facility] mail [facility].   LOV 1/27/17 Dr. Mcguire    Thanks

## 2017-05-12 NOTE — TELEPHONE ENCOUNTER
----- Message from Suni ROYA Micah sent at 5/12/2017 12:01 PM CDT -----  Contact: Pt:[phone]  Pt called and stated that she needs 2 scripts sent into the pharmacy.  insulin regular hum U-500 conc 500 unit/mL (3 mL) InPn and Januvia.      Patient also  Needs test strips for her meter 4 times a day she is testing asking for a 90 day supplies for all.  Meter is a Trumetr[employer] meter.    [facility] - [address] [phone] (Phone) [phone] (Fax)    Patient can be reached at [phone].  Thanks

## 2017-05-16 ENCOUNTER — LAB VISIT (OUTPATIENT)
Dept: LAB | Facility: HOSPITAL | Age: 67
End: 2017-05-16
Payer: MEDICARE

## 2017-05-16 ENCOUNTER — OFFICE VISIT (OUTPATIENT)
Dept: PODIATRY | Facility: CLINIC | Age: 67
End: 2017-05-16
Payer: MEDICARE

## 2017-05-16 VITALS
HEART RATE: 75 BPM | HEIGHT: 60 IN | BODY MASS INDEX: 57.52 KG/M2 | DIASTOLIC BLOOD PRESSURE: 71 MMHG | WEIGHT: 293 LBS | SYSTOLIC BLOOD PRESSURE: 131 MMHG

## 2017-05-16 DIAGNOSIS — N18.30 CKD (CHRONIC KIDNEY DISEASE) STAGE 3, GFR 30-59 ML/MIN: Chronic | ICD-10-CM

## 2017-05-16 DIAGNOSIS — I10 ESSENTIAL HYPERTENSION: ICD-10-CM

## 2017-05-16 DIAGNOSIS — B35.1 ONYCHOMYCOSIS DUE TO DERMATOPHYTE: ICD-10-CM

## 2017-05-16 DIAGNOSIS — E11.42 TYPE 2 DIABETES MELLITUS WITH DIABETIC POLYNEUROPATHY, WITH LONG-TERM CURRENT USE OF INSULIN: ICD-10-CM

## 2017-05-16 DIAGNOSIS — L84 CORN OR CALLUS: ICD-10-CM

## 2017-05-16 DIAGNOSIS — Z79.4 TYPE 2 DIABETES MELLITUS WITH DIABETIC POLYNEUROPATHY, WITH LONG-TERM CURRENT USE OF INSULIN: ICD-10-CM

## 2017-05-16 DIAGNOSIS — D64.9 ANEMIA, UNSPECIFIED TYPE: Chronic | ICD-10-CM

## 2017-05-16 DIAGNOSIS — E55.9 VITAMIN D INSUFFICIENCY: ICD-10-CM

## 2017-05-16 DIAGNOSIS — E11.49 TYPE II DIABETES MELLITUS WITH NEUROLOGICAL MANIFESTATIONS: Primary | ICD-10-CM

## 2017-05-16 LAB
ALBUMIN SERPL BCP-MCNC: 3.4 G/DL
ANION GAP SERPL CALC-SCNC: 8 MMOL/L
BASOPHILS # BLD AUTO: 0.02 K/UL
BASOPHILS NFR BLD: 0.3 %
BUN SERPL-MCNC: 30 MG/DL
CALCIUM SERPL-MCNC: 9.8 MG/DL
CHLORIDE SERPL-SCNC: 108 MMOL/L
CO2 SERPL-SCNC: 27 MMOL/L
CREAT SERPL-MCNC: 1.8 MG/DL
DIFFERENTIAL METHOD: ABNORMAL
EOSINOPHIL # BLD AUTO: 0.3 K/UL
EOSINOPHIL NFR BLD: 5.2 %
ERYTHROCYTE [DISTWIDTH] IN BLOOD BY AUTOMATED COUNT: 14.6 %
EST. GFR  (AFRICAN AMERICAN): 33.1 ML/MIN/1.73 M^2
EST. GFR  (NON AFRICAN AMERICAN): 28.7 ML/MIN/1.73 M^2
GLUCOSE SERPL-MCNC: 273 MG/DL
HCT VFR BLD AUTO: 32.6 %
HGB BLD-MCNC: 10.2 G/DL
LYMPHOCYTES # BLD AUTO: 1.6 K/UL
LYMPHOCYTES NFR BLD: 24.9 %
MCH RBC QN AUTO: 27.6 PG
MCHC RBC AUTO-ENTMCNC: 31.3 %
MCV RBC AUTO: 88 FL
MONOCYTES # BLD AUTO: 0.5 K/UL
MONOCYTES NFR BLD: 7.5 %
NEUTROPHILS # BLD AUTO: 4 K/UL
NEUTROPHILS NFR BLD: 61.8 %
PHOSPHATE SERPL-MCNC: 2.8 MG/DL
PLATELET # BLD AUTO: 235 K/UL
PMV BLD AUTO: 10.8 FL
POTASSIUM SERPL-SCNC: 5 MMOL/L
PTH-INTACT SERPL-MCNC: 87 PG/ML
RBC # BLD AUTO: 3.7 M/UL
SODIUM SERPL-SCNC: 143 MMOL/L
WBC # BLD AUTO: 6.54 K/UL

## 2017-05-16 PROCEDURE — 11056 PARNG/CUTG B9 HYPRKR LES 2-4: CPT | Mod: Q9,PBBFAC | Performed by: PODIATRIST

## 2017-05-16 PROCEDURE — 99499 UNLISTED E&M SERVICE: CPT | Mod: S$PBB,,, | Performed by: PODIATRIST

## 2017-05-16 PROCEDURE — 99212 OFFICE O/P EST SF 10 MIN: CPT | Mod: PBBFAC | Performed by: PODIATRIST

## 2017-05-16 PROCEDURE — 11056 PARNG/CUTG B9 HYPRKR LES 2-4: CPT | Mod: Q9,S$PBB,, | Performed by: PODIATRIST

## 2017-05-16 PROCEDURE — 11721 DEBRIDE NAIL 6 OR MORE: CPT | Mod: 59,Q9,PBBFAC | Performed by: PODIATRIST

## 2017-05-16 PROCEDURE — 11721 DEBRIDE NAIL 6 OR MORE: CPT | Mod: 59,Q9,S$PBB, | Performed by: PODIATRIST

## 2017-05-16 PROCEDURE — 99999 PR PBB SHADOW E&M-EST. PATIENT-LVL II: CPT | Mod: PBBFAC,,, | Performed by: PODIATRIST

## 2017-05-16 NOTE — PROGRESS NOTES
Subjective:      Patient ID: Anniak Mac is a 66 y.o. female.    Chief Complaint: PCP (Shauna  02/03/2017); Diabetic Foot Exam; and Nail Care    Annika is a 66 y.o. female who presents to the clinic for evaluation and treatment of high risk feet. Annika has a past medical history of Allergy; Anemia (7/27/2012); Arthritis; CHF (congestive heart failure); CKD (chronic kidney disease) stage 3, GFR 30-59 ml/min (7/27/2012); Clotting disorder; Deep vein thrombophlebitis of left leg (7/27/2012); Degenerative disc disease; Diabetic peripheral neuropathy associated with type 2 diabetes mellitus (7/27/2012); GERD (gastroesophageal reflux disease); HTN (hypertension) (7/27/2012); Hyperlipidemia (7/27/2012); Lead-induced gout of ankle or foot; Nonproliferative diabetic retinopathy of right eye (7/27/2012); Obstructive sleep apnea (7/27/2012); Osteoporosis; Proliferative diabetic retinopathy of left eye (7/27/2012); Stroke (8/1/2003); Type 2 diabetes mellitus with diabetic polyneuropathy (7/27/2012); and Ulcer. The patient's chief complaint is long, thick toenailsThis patient has documented high risk feet requiring routine maintenance secondary to diabetes mellitis and those secondary complications of diabetes, as mentioned..       PCP: Cody Villatoro MD    Date Last Seen by PCP:   Chief Complaint   Patient presents with    PCP     Shauna  02/03/2017    Diabetic Foot Exam    Nail Care       Chief Complaint   Patient presents with    PCP     Shauna  02/03/2017    Diabetic Foot Exam    Nail Care       Current shoe gear: black leather DM shoes w/ custom inserts     Hemoglobin A1C   Date Value Ref Range Status   03/21/2017 9.4 (H) 4.5 - 6.2 % Final     Comment:     According to ADA guidelines, hemoglobin A1C <7.0% represents  optimal control in non-pregnant diabetic patients.  Different  metrics may apply to specific populations.   Standards of Medical Care in Diabetes - 2016.  For the purpose of screening for the presence of  diabetes:  <5.7%     Consistent with the absence of diabetes  5.7-6.4%  Consistent with increasing risk for diabetes   (prediabetes)  >or=6.5%  Consistent with diabetes  Currently no consensus exists for use of hemoglobin A1C  for diagnosis of diabetes for children.     11/25/2016 9.1 (H) 4.5 - 6.2 % Final     Comment:     According to ADA guidelines, hemoglobin A1C <7.0% represents  optimal control in non-pregnant diabetic patients.  Different  metrics may apply to specific populations.   Standards of Medical Care in Diabetes - 2016.  For the purpose of screening for the presence of diabetes:  <5.7%     Consistent with the absence of diabetes  5.7-6.4%  Consistent with increasing risk for diabetes   (prediabetes)  >or=6.5%  Consistent with diabetes  Currently no consensus exists for use of hemoglobin A1C  for diagnosis of diabetes for children.     08/25/2016 10.8 (H) 4.5 - 6.2 % Final     Comment:     According to ADA guidelines, hemoglobin A1C <7.0% represents  optimal control in non-pregnant diabetic patients.  Different  metrics may apply to specific populations.   Standards of Medical Care in Diabetes - 2016.  For the purpose of screening for the presence of diabetes:  <5.7%     Consistent with the absence of diabetes  5.7-6.4%  Consistent with increasing risk for diabetes   (prediabetes)  >or=6.5%  Consistent with diabetes  Currently no consensus exists for use of hemoglobin A1C  for diagnosis of diabetes for children.         Review of Systems   Constitution: Negative for chills, decreased appetite and fever.   Cardiovascular: Negative for leg swelling.   Skin: Positive for dry skin and nail changes.   Musculoskeletal: Positive for arthritis and falls. Negative for back pain, gout, joint pain, joint swelling and myalgias.   Gastrointestinal: Negative for nausea and vomiting.   Neurological: Positive for numbness. Negative for loss of balance and paresthesias.           Objective:      Physical Exam    Constitutional: She is oriented to person, place, and time. She appears well-developed and well-nourished. No distress.   Cardiovascular:   Dorsalis pedis and posterior tibial pulses are diminished Bilaterally. Toes are cool to touch. Feet are warm proximally.There is decreased digital hair. Skin is atrophic, slightly hyperpigmented, and mildly edematous       Musculoskeletal: Normal range of motion. She exhibits no edema or tenderness.   Equinus noted b/l ankles, gastroc. Adequate joint range of motion without pain, limitation, nor crepitation Bilateral pedal joints. Muscle strength is 5/5 in all groups bilaterally.        Neurological: She is alert and oriented to person, place, and time.   Roby-Vincent 5.07 monofilamant testing is diminished [place] feet. Sharp/dull sensation diminished Bilaterally. Light touch absent Bilaterally.       Skin: Skin is warm, dry and intact. No abrasion, no bruising, no burn, no laceration, no lesion, no petechiae and no rash noted. She is not diaphoretic. No pallor.   Nails 1-5 b/l  are elongated by  2-4mm's, thickened by 3-4 mm's, dystrophic, and are darkened in  coloration . Xerosis Bilaterally. No open lesions noted.      Hyperkeratotic tissue noted to distal 2nd toe b/l      Psychiatric: She has a normal mood and affect. Thought content normal.   Nursing note and vitals reviewed.            Assessment:       Encounter Diagnoses   Name Primary?    Type II diabetes mellitus with neurological manifestations Yes    Onychomycosis due to dermatophyte     Corn or callus          Plan:       Annika was seen today for pcp, diabetic foot exam and nail care.    Diagnoses and all orders for this visit:    Type II diabetes mellitus with neurological manifestations    Onychomycosis due to dermatophyte    Corn or callus      I counseled the patient on her conditions, their implications and medical management.        - Shoe inspection. Diabetic Foot Education. Patient reminded of the  importance of good nutrition and blood sugar control to help prevent podiatric complications of diabetes. Patient instructed on proper foot hygeine. We discussed wearing proper shoe gear, daily foot inspections, never walking without protective shoe gear, never putting sharp instruments to feet, routine podiatric nail visits every 2-3 months.      - With patient's permission, nails were aggressively reduced and debrided x 10 to their soft tissue attachment mechanically and with electric , removing all offending nail and debris. Patient relates relief following the procedure. She will continue to monitor the areas daily, inspect her feet, wear protective shoe gear when ambulatory, moisturizer to maintain skin integrity and follow in this office in approximately 2-3 months, sooner p.r.n.    - After cleansing the  area w/ alcohol prep pad the above mentioned hyperkeratosis was trimmed utilizing No 15 scapel, to a smooth base with out incident. Patient tolerated this  well and reported comfort to the area of distal 2nd toe b/l

## 2017-05-22 ENCOUNTER — OFFICE VISIT (OUTPATIENT)
Dept: NEPHROLOGY | Facility: CLINIC | Age: 67
End: 2017-05-22
Payer: MEDICARE

## 2017-05-22 VITALS
HEART RATE: 65 BPM | OXYGEN SATURATION: 97 % | WEIGHT: 293 LBS | BODY MASS INDEX: 60.54 KG/M2 | DIASTOLIC BLOOD PRESSURE: 62 MMHG | SYSTOLIC BLOOD PRESSURE: 130 MMHG

## 2017-05-22 DIAGNOSIS — E78.5 DYSLIPIDEMIA: Chronic | ICD-10-CM

## 2017-05-22 DIAGNOSIS — E55.9 VITAMIN D INSUFFICIENCY: ICD-10-CM

## 2017-05-22 DIAGNOSIS — D50.9 IRON DEFICIENCY ANEMIA, UNSPECIFIED IRON DEFICIENCY ANEMIA TYPE: Chronic | ICD-10-CM

## 2017-05-22 DIAGNOSIS — M1A.00X0 IDIOPATHIC CHRONIC GOUT WITHOUT TOPHUS, UNSPECIFIED SITE: ICD-10-CM

## 2017-05-22 DIAGNOSIS — I10 ESSENTIAL HYPERTENSION: ICD-10-CM

## 2017-05-22 DIAGNOSIS — N18.30 CKD (CHRONIC KIDNEY DISEASE) STAGE 3, GFR 30-59 ML/MIN: Primary | Chronic | ICD-10-CM

## 2017-05-22 DIAGNOSIS — B37.2 CANDIDIASIS, CUTANEOUS: ICD-10-CM

## 2017-05-22 DIAGNOSIS — E66.01 MORBID OBESITY DUE TO EXCESS CALORIES: Chronic | ICD-10-CM

## 2017-05-22 DIAGNOSIS — K21.9 GASTROESOPHAGEAL REFLUX DISEASE WITHOUT ESOPHAGITIS: ICD-10-CM

## 2017-05-22 PROCEDURE — 99213 OFFICE O/P EST LOW 20 MIN: CPT | Mod: S$PBB,,, | Performed by: INTERNAL MEDICINE

## 2017-05-22 PROCEDURE — 99215 OFFICE O/P EST HI 40 MIN: CPT | Mod: PBBFAC | Performed by: INTERNAL MEDICINE

## 2017-05-22 PROCEDURE — 99999 PR PBB SHADOW E&M-EST. PATIENT-LVL V: CPT | Mod: PBBFAC,,, | Performed by: INTERNAL MEDICINE

## 2017-05-22 RX ORDER — NYSTATIN 100000 [USP'U]/G
POWDER TOPICAL 3 TIMES DAILY
Qty: 60 G | Refills: 2 | Status: SHIPPED | OUTPATIENT
Start: 2017-05-22 | End: 2018-06-18

## 2017-05-22 NOTE — PATIENT INSTRUCTIONS
1. Labs: irons, ferritins and renal US in next few weeks, all other labs 4 months    2.  Medications: nystatin powder 3 times daily to affected area, and clean skin th dry thoroughly before applying    3. Referrals:    4. Follow up with PCP regarding: skin infection, and high blood sugars  5. BP:  Take BP and pulse  twice daily for one week, record              Bring results  to next visit.              Goal :   <130/80    6. Diet:  National Kidney Foundation:  www.kidney.org       Sodium: < 2000 milligrams daily including all food and drink      (one teaspoon of table salt has 2300 milligrams of sodium)         Potassium: 2 gram         Phosphorus: <1000mg daily       Vaccines: please check with your PCP and be sure to have an annual flu vaccine and keep up to date with your pneumococcal vaccine for pneumonia      Please avoid or minimize all NSAIDS (ibuprofen, motrin, aleve, indocin, naprosyn, BC powder, mobic, relafen, alleve, and any others) to minimize the risk to your kidneys    Please avoid Proton pump inhibitors unless specifically necessary and speak with your PCP about alternatives such as H2 blockers     Return to clinic: 4 months labs prior

## 2017-05-22 NOTE — PROGRESS NOTES
Subjective:       Patient ID:   Annika Mac is a 66 y.o. Black or  female who presents for new evaluation of CKD.  Ms. Mac is a very pleasant female with a long history of DM and HTN. She is having difficulty managing diabetes at present and is scheduled for continuous monitoring. Most recent creatinine is 1.8. Her creatinine did spike at 2.5 in January 2016. After discussion, she did recall a severe gout attack and use of colchicine at the time.       HPI   Her blood sugars have been elevated as high as 273 on labs. She c/o rash under abdomen using fungal creme without improvement , no fever or chills, taking spironolactone and avapro and lasix, but she is not on a low K diet.  Urinating well, but burning. Recent UA negative and UPRCT no proteinuria.No recent gout.    Review of Systems   Constitutional: Negative for activity change, appetite change, chills, diaphoresis, fatigue, fever and unexpected weight change.   HENT: Negative for congestion, facial swelling and trouble swallowing.    Eyes: Negative for pain, discharge, redness and visual disturbance.   Respiratory: Negative for cough, shortness of breath and wheezing.    Cardiovascular: Negative for chest pain, palpitations and leg swelling.   Gastrointestinal: Negative for abdominal distention, abdominal pain, constipation and diarrhea.   Endocrine: Negative for cold intolerance and heat intolerance.   Genitourinary: Negative for decreased urine volume, difficulty urinating, dysuria, flank pain, frequency and urgency.   Musculoskeletal: Positive for arthralgias. Negative for joint swelling and myalgias.   Skin: Negative for color change.   Allergic/Immunologic: Negative for immunocompromised state.   Neurological: Negative for dizziness, tremors, syncope, speech difficulty, weakness, light-headedness and numbness.   Hematological: Negative for adenopathy.   Psychiatric/Behavioral: Negative for agitation, confusion, decreased concentration  and dysphoric mood.       Objective:     Blood pressure 130/62, pulse 65, weight (!) 140.6 kg (310 lb), SpO2 97 %.      Physical Exam   Constitutional: She is oriented to person, place, and time. She appears well-developed and well-nourished.   Obese   HENT:   Head: Normocephalic and atraumatic.   Eyes: Conjunctivae and EOM are normal. Pupils are equal, round, and reactive to light.   Neck: Neck supple.   Cardiovascular: Normal rate, regular rhythm, normal heart sounds and intact distal pulses.    +1 bilateral LE edema   Pulmonary/Chest: Effort normal and breath sounds normal.   Abdominal: Soft. Bowel sounds are normal.   Musculoskeletal: Normal range of motion.   Neurological: She is alert and oriented to person, place, and time. She has normal reflexes.   Skin: Skin is warm and dry.   large panniculus with large discolored moist area under folds c/w candida, no erythema, or skin sloughing.   Psychiatric: She has a normal mood and affect. Her behavior is normal.   Nursing note and vitals reviewed.      Assessment:       1. CKD (chronic kidney disease) stage 3, GFR 30-59 ml/min    2. Essential hypertension    3. Dyslipidemia    4. Gastroesophageal reflux disease without esophagitis    5. Morbid obesity due to excess calories    6. Idiopathic chronic gout without tophus, unspecified site    7. Vitamin D insufficiency    8. Iron deficiency anemia, unspecified iron deficiency anemia type    9. Candidiasis, cutaneous        Plan:       CKD stage 3 likely secondary to longstanding poorly controlled DM and HTn, with past GEORGE 2016 possibly related to gout attacka nd colchicine use. Currnetly serum creatinine stable at 1.6-1.8.   Will order serologies and hep studies and renal US.  1. CKD 3 stable  Gout: check uric acid and continue allopurinol  UA: no cells or proteinuria:  burnign likely from candidasisi of skin/interrigonal area, Nystatin as below.  May consider 24 hr stone risk profile      Lab Results   Component Value  Date    CREATININE 1.8 (H) 05/16/2017     Protein Creatinine Ratios: No proteinuria. Ua from today pending.     Prot/Creat Ratio, Ur   Date Value Ref Range Status   05/16/2017 Unable to calculate 0.00 - 0.20 Final   10/10/2016 Unable to calculate 0.00 - 0.20 Final   05/09/2016 0.03 0.00 - 0.20 Final     ·   ·   Hyperkalmeia: borderline serum potassiums  Counseled on low K diet. Aldactone, lisinopril and lasix as below.  Lab Results   Component Value Date     05/16/2017    K 5.0 05/16/2017    CO2 27 05/16/2017     2. HTN: Controlled on current regimen. Continue Lasix, Aldactone and Lisinopril.      3. Renal osteodystrophy: stable PTH level check  Vitamin D.   Lab Results   Component Value Date    PTH 87.0 (H) 05/16/2017    CALCIUM 9.8 05/16/2017    CAION 1.27 02/28/2012    PHOS 2.8 05/16/2017       4. Anemia: Stable. Asymptomatic.  Lab Results   Component Value Date    HGB 10.2 (L) 05/16/2017        5. DM:  Poorly controlled. Had nutritional counseling today.f/u pcp    Candidiaisis:  Nystatin powder tid and clean and dry thoroughly and f/u asap with PCP   Lab Results   Component Value Date    HGBA1C 9.4 (H) 03/21/2017       6. Lipid management: Controlled. Continue Statin.   Lab Results   Component Value Date    LDLCALC 84.6 04/29/2016         Follow up in 4 months.

## 2017-05-23 DIAGNOSIS — E78.2 MIXED HYPERLIPIDEMIA: ICD-10-CM

## 2017-05-23 RX ORDER — ACETAMINOPHEN 160 MG/5ML
SUSPENSION, ORAL (FINAL DOSE FORM) ORAL
Qty: 300 EACH | Refills: 4 | Status: SHIPPED | OUTPATIENT
Start: 2017-05-23 | End: 2017-06-12 | Stop reason: SDUPTHER

## 2017-05-23 RX ORDER — BIOTIN 1 MG
1 TABLET ORAL 4 TIMES DAILY
Qty: 400 STRIP | Refills: 4 | Status: SHIPPED | OUTPATIENT
Start: 2017-05-23 | End: 2018-02-07 | Stop reason: SDUPTHER

## 2017-05-23 RX ORDER — SIMVASTATIN 40 MG/1
40 TABLET, FILM COATED ORAL NIGHTLY
Qty: 90 TABLET | Refills: 4 | Status: SHIPPED | OUTPATIENT
Start: 2017-05-23 | End: 2017-11-02 | Stop reason: SDUPTHER

## 2017-05-24 ENCOUNTER — TELEPHONE (OUTPATIENT)
Dept: INTERNAL MEDICINE | Facility: CLINIC | Age: 67
End: 2017-05-24

## 2017-05-24 DIAGNOSIS — T78.40XD ALLERGY, SUBSEQUENT ENCOUNTER: ICD-10-CM

## 2017-05-24 RX ORDER — MINERAL OIL
ENEMA (ML) RECTAL
Qty: 30 TABLET | Refills: 0 | Status: SHIPPED | OUTPATIENT
Start: 2017-05-24 | End: 2017-06-22 | Stop reason: SDUPTHER

## 2017-05-26 ENCOUNTER — OFFICE VISIT (OUTPATIENT)
Dept: INTERNAL MEDICINE | Facility: CLINIC | Age: 67
End: 2017-05-26
Payer: MEDICARE

## 2017-05-26 VITALS
HEIGHT: 60 IN | WEIGHT: 293 LBS | DIASTOLIC BLOOD PRESSURE: 80 MMHG | SYSTOLIC BLOOD PRESSURE: 130 MMHG | HEIGHT: 60 IN | HEART RATE: 80 BPM | BODY MASS INDEX: 57.52 KG/M2 | WEIGHT: 293 LBS | DIASTOLIC BLOOD PRESSURE: 80 MMHG | BODY MASS INDEX: 57.52 KG/M2 | SYSTOLIC BLOOD PRESSURE: 130 MMHG | HEART RATE: 80 BPM

## 2017-05-26 DIAGNOSIS — E78.5 DYSLIPIDEMIA: Chronic | ICD-10-CM

## 2017-05-26 DIAGNOSIS — I73.9 PAD (PERIPHERAL ARTERY DISEASE): Chronic | ICD-10-CM

## 2017-05-26 DIAGNOSIS — E11.42 TYPE 2 DIABETES MELLITUS WITH DIABETIC POLYNEUROPATHY, WITH LONG-TERM CURRENT USE OF INSULIN: ICD-10-CM

## 2017-05-26 DIAGNOSIS — H91.92 DECREASED HEARING, LEFT: ICD-10-CM

## 2017-05-26 DIAGNOSIS — M47.816 LUMBAR FACET ARTHROPATHY: ICD-10-CM

## 2017-05-26 DIAGNOSIS — Z79.4 TYPE 2 DIABETES MELLITUS WITH DIABETIC POLYNEUROPATHY, WITH LONG-TERM CURRENT USE OF INSULIN: ICD-10-CM

## 2017-05-26 DIAGNOSIS — E66.01 MORBID OBESITY DUE TO EXCESS CALORIES: Chronic | ICD-10-CM

## 2017-05-26 DIAGNOSIS — N18.30 CKD (CHRONIC KIDNEY DISEASE) STAGE 3, GFR 30-59 ML/MIN: Chronic | ICD-10-CM

## 2017-05-26 DIAGNOSIS — G47.33 OBSTRUCTIVE SLEEP APNEA: Chronic | ICD-10-CM

## 2017-05-26 DIAGNOSIS — Z79.4 TYPE 2 DIABETES MELLITUS WITH DIABETIC POLYNEUROPATHY, WITH LONG-TERM CURRENT USE OF INSULIN: Primary | ICD-10-CM

## 2017-05-26 DIAGNOSIS — E11.42 TYPE 2 DIABETES MELLITUS WITH DIABETIC POLYNEUROPATHY, WITH LONG-TERM CURRENT USE OF INSULIN: Primary | ICD-10-CM

## 2017-05-26 DIAGNOSIS — Z00.00 ENCOUNTER FOR PREVENTIVE HEALTH EXAMINATION: ICD-10-CM

## 2017-05-26 DIAGNOSIS — Z78.0 POST-MENOPAUSAL: Primary | ICD-10-CM

## 2017-05-26 DIAGNOSIS — E11.43 DIABETIC AUTONOMIC NEUROPATHY ASSOCIATED WITH TYPE 2 DIABETES MELLITUS: ICD-10-CM

## 2017-05-26 DIAGNOSIS — I10 ESSENTIAL HYPERTENSION: ICD-10-CM

## 2017-05-26 PROCEDURE — 99213 OFFICE O/P EST LOW 20 MIN: CPT | Mod: PBBFAC | Performed by: FAMILY MEDICINE

## 2017-05-26 PROCEDURE — G0438 PPPS, INITIAL VISIT: HCPCS | Mod: ,,, | Performed by: NURSE PRACTITIONER

## 2017-05-26 PROCEDURE — 99999 PR PBB SHADOW E&M-EST. PATIENT-LVL IV: CPT | Mod: PBBFAC,,, | Performed by: NURSE PRACTITIONER

## 2017-05-26 PROCEDURE — 99215 OFFICE O/P EST HI 40 MIN: CPT | Mod: S$PBB,,, | Performed by: FAMILY MEDICINE

## 2017-05-26 PROCEDURE — 99999 PR PBB SHADOW E&M-EST. PATIENT-LVL III: CPT | Mod: PBBFAC,,, | Performed by: FAMILY MEDICINE

## 2017-05-26 RX ORDER — FLUCONAZOLE 150 MG/1
150 TABLET ORAL EVERY OTHER DAY
Qty: 4 TABLET | Refills: 1 | Status: SHIPPED | OUTPATIENT
Start: 2017-05-26 | End: 2017-05-30

## 2017-05-26 NOTE — PROGRESS NOTES
Annika Mac presented for a  Medicare AWV and comprehensive Health Risk Assessment today. The following components were reviewed and updated:    · Medical history  · Family History  · Social history  · Allergies and Current Medications  · Health Risk Assessment  · Health Maintenance  · Care Team     ** See Completed Assessments for Annual Wellness Visit within the encounter summary.**       The following assessments were completed:  · Living Situation  · CAGE  · Depression Screening  · Timed Get Up and Go  · Whisper Test  · Cognitive Function Screening  · Nutrition Screening  · ADL Screening  · PAQ Screening    Vitals:    05/26/17 0838   BP: 130/80   Pulse: 80   Weight: (!) 140.1 kg (308 lb 13.8 oz)   Height: 5' (1.524 m)     Body mass index is 60.32 kg/m².  Physical Exam   Constitutional: She is oriented to person, place, and time. She appears well-developed.   obese   HENT:   Head: Normocephalic and atraumatic.   Nose: Nose normal.   Eyes: Conjunctivae and EOM are normal.   Neck: Normal range of motion. Neck supple.   Cardiovascular: Normal rate, regular rhythm, normal heart sounds and intact distal pulses.    No murmur heard.  Pulmonary/Chest: Effort normal and breath sounds normal.   Musculoskeletal: Normal range of motion.   Neurological: She is alert and oriented to person, place, and time.   Skin: Skin is warm and dry.   Psychiatric: She has a normal mood and affect. Her behavior is normal. Judgment and thought content normal.   Nursing note and vitals reviewed.        Diagnoses and health risks identified today and associated recommendations/orders:    1. Post-menopausal  - DXA Bone Density Spine And Hip; Future    2. Encounter for preventive health examination  Assessment performed. Health maintenance updated. Chart review completed.    3. Type 2 diabetes mellitus with diabetic polyneuropathy, with long-term current use of insulin  - Ambulatory consult to Diabetic Education. A1C not at goal 9.4. Followed by  Endocrinology.    4. Decreased hearing, left  - Ambulatory Referral to Audiology    5. Dyslipidemia  Stable on medication. Followed by PCP.     6. Essential hypertension  Stable on medication. Followed by PCP.     7. PAD (peripheral artery disease)  Stable with medication. Followed by Cardiology.    8. Obstructive sleep apnea  Chronic. Stable on CPAP. Followed by Sleep Medicine.    9. Lumbar facet arthropathy  Stable with medication. Chronic. Followed by Orthopedics and Physical Medicine.    10. Morbid obesity due to excess calories  - Ambulatory consult to Diabetic Education. A1C not at goal. Does not exercise due to knee and back discomfort. Followed by PCP.    11. Diabetic autonomic neuropathy associated with type 2 diabetes mellitus  - Ambulatory consult to Diabetic Education. A1C not at goal. Followed by Endocrinology.      Provided Annika with a 5-10 year written screening schedule and personal prevention plan. Recommendations were developed using the USPSTF age appropriate recommendations. Education, counseling, and referrals were provided as needed. After Visit Summary printed and given to patient which includes a list of additional screenings\tests needed.    Return for follow up with Primary Care Provider as instructed, ;sooner if problems, HRA in 1 year.    LOUISA Miller

## 2017-05-26 NOTE — PATIENT INSTRUCTIONS
Counseling and Referral of Other Preventative  (Italic type indicates deductible and co-insurance are waived)    Patient Name: Annika Mac  Today's Date: 5/26/2017      SERVICE LIMITATIONS RECOMMENDATION    Vaccines    · Pneumococcal (once after 65)    · Influenza (annually)    · Hepatitis B (if medium/high risk)    · Prevnar 13      Hepatitis B medium/high risk factors:       - End-stage renal disease       - Hemophiliacs who received Factor VII or         IX concentrates       - Clients of institutions for the mentally             retarded       - Persons who live in the same house as          a HepB carrier       - Homosexual men       - Illicit injectable drug abusers     Pneumococcal: Done, no repeat necessary     Influenza: Done, repeat in one year     Hepatitis B: N/A     Prevnar 13: Done, no repeat necessary    Mammogram (biennial age 50-74)  Annually (age 40 or over)  Done this year, repeat every year    Pap (up to age 70 and after 70 if unknown history or abnormal study last 10 years)    N/A     The USPSTF recommends against screening for cervical cancer in women older than age 65 years who have had adequate prior screening and are not otherwise at high risk for cervical cancer.      Colorectal cancer screening (to age 75)    · Fecal occult blood test (annual)  · Flexible sigmoidoscopy (5y)  · Screening colonoscopy (10y)  · Barium enema   Last done 4/3/2008, recommend to repeat every 10  years    Diabetes self-management training (no USPSTF recommendations)  Requires referral by treating physician for patient with diabetes or renal disease. 10 hours of initial DSMT sessions of no less than 30 minutes each in a continuous 12-month period. 2 hours of follow-up DSMT in subsequent years.  Scheduled, see appointments    Bone mass measurements (age 65 & older, biennial)  Requires diagnosis related to osteoporosis or estrogen deficiency. Biennial benefit unless patient has history of long-term glucocorticoid   Scheduled, see appointments    Glaucoma screening (no USPSTF recommendation)  Diabetes mellitus, family history   , age 50 or over    American, age 65 or over  Done this year, repeat every year    Medical nutrition therapy for diabetes or renal disease (no recommended schedule)  Requires referral by treating physician for patient with diabetes or renal disease or kidney transplant within the past 3 years.  Can be provided in same year as diabetes self-management training (DSMT), and CMS recommends medical nutrition therapy take place after DSMT. Up to 3 hours for initial year and 2 hours in subsequent years.  Scheduled, see appointments    Cardiovascular screening blood tests (every 5 years)  · Fasting lipid panel  Order as a panel if possible  Done this year, repeat every year    Diabetes screening tests (at least every 3 years, Medicare covers annually or at 6-month intervals for prediabetic patients)  · Fasting blood sugar (FBS) or glucose tolerance test (GTT)  Patient must be diagnosed with one of the following:       - Hypertension       - Dyslipidemia       - Obesity (BMI 30kg/m2)       - Previous elevated impaired FBS or GTT       ... or any two of the following:       - Overweight (BMI 25 but <30)       - Family history of diabetes       - Age 65 or older       - History of gestational diabetes or birth of baby weighing more than 9 pounds  Done this year, repeat every year    Abdominal aortic aneurysm screening (once)  · Sonogram   Limited to patients who meet one of the following criteria:       - Men who are 65-75 years old and have smoked more than 100 cigarette in their lifetime       - Anyone with a family history of abdominal aortic aneurysm       - Anyone recommended for screening by the USPSTF  N/A    HIV screening (annually for increased risk patients)  · HIV-1 and HIV-2 by EIA, or JANAE, rapid antibody test or oral mucosa transudate  Patients must be at increased risk for  HIV infection per USPSTF guidelines or pregnant. Tests covered annually for patient at increased risk or as requested by the patient. Pregnant patients may receive up to 3 tests during pregnancy.  Risks discussed, screening is not recommended    Smoking cessation counseling (up to 8 sessions per year)  Patients must be asymptomatic of tobacco-related conditions to receive as a preventative service.  Non-smoker    Subsequent annual wellness visit  At least 12 months since last AWV  Return in one year     The following information is provided to all patients.  This information is to help you find resources for any of the problems found today that may be affecting your health:                Living healthy guide: www.[facility].[place].[facility]      Understanding Diabetes: www.diabetes.org      Eating healthy: www.cdc.gov/healthyweight      [facility] home safety checklist: www.cdc.gov/steadi/patient.html      Agency on Aging: www.goea.[place].[facility]      Alcoholics anonymous (AA): www.aa.org      Physical Activity: www.eric.nih.gov/ay3jlnp      Tobacco use: www.quitwithusla.org

## 2017-06-06 ENCOUNTER — TELEPHONE (OUTPATIENT)
Dept: ENDOCRINOLOGY | Facility: CLINIC | Age: 67
End: 2017-06-06

## 2017-06-06 DIAGNOSIS — I10 ESSENTIAL HYPERTENSION: ICD-10-CM

## 2017-06-06 DIAGNOSIS — K21.9 GASTROESOPHAGEAL REFLUX DISEASE, ESOPHAGITIS PRESENCE NOT SPECIFIED: ICD-10-CM

## 2017-06-06 NOTE — TELEPHONE ENCOUNTER
----- Message from Ivelisse Cee sent at 6/6/2017  4:01 PM CDT -----  Contact: pt  Pt need refills on her Avapro 300 mg and Protonix 40 mg and she would like it sent to [facility].  LOV 1/27/17     Thanks

## 2017-06-06 NOTE — TELEPHONE ENCOUNTER
----- Message from Cara Huertas sent at 6/6/2017 10:45 AM CDT -----  Contact: [facility]  [facility]    [phone]   REF # [id number]    Requested by fax 6/5    Clarification     Form of insulin         Humolog  U 500 Concentrated insulin       -ordered Vials     Should it be QuicPen ???    Pt was on Quic pen previously  Please call  Thanks

## 2017-06-07 RX ORDER — IRBESARTAN 300 MG/1
300 TABLET ORAL NIGHTLY
Qty: 90 TABLET | Refills: 3 | Status: SHIPPED | OUTPATIENT
Start: 2017-06-07 | End: 2017-06-09 | Stop reason: SDUPTHER

## 2017-06-07 RX ORDER — PANTOPRAZOLE SODIUM 40 MG/1
40 TABLET, DELAYED RELEASE ORAL DAILY
Qty: 90 TABLET | Refills: 3 | Status: SHIPPED | OUTPATIENT
Start: 2017-06-07 | End: 2017-06-09 | Stop reason: SDUPTHER

## 2017-06-09 ENCOUNTER — TELEPHONE (OUTPATIENT)
Dept: INTERNAL MEDICINE | Facility: CLINIC | Age: 67
End: 2017-06-09

## 2017-06-09 ENCOUNTER — HOSPITAL ENCOUNTER (OUTPATIENT)
Dept: RADIOLOGY | Facility: CLINIC | Age: 67
Discharge: HOME | End: 2017-06-09
Attending: NURSE PRACTITIONER
Payer: MEDICARE

## 2017-06-09 ENCOUNTER — TELEPHONE (OUTPATIENT)
Dept: ENDOCRINOLOGY | Facility: CLINIC | Age: 67
End: 2017-06-09

## 2017-06-09 DIAGNOSIS — K21.9 GASTROESOPHAGEAL REFLUX DISEASE, ESOPHAGITIS PRESENCE NOT SPECIFIED: ICD-10-CM

## 2017-06-09 DIAGNOSIS — Z78.0 POST-MENOPAUSAL: ICD-10-CM

## 2017-06-09 DIAGNOSIS — I10 ESSENTIAL HYPERTENSION: ICD-10-CM

## 2017-06-09 PROCEDURE — 77080 DXA BONE DENSITY AXIAL: CPT | Mod: 26,,, | Performed by: INTERNAL MEDICINE

## 2017-06-09 PROCEDURE — 77080 DXA BONE DENSITY AXIAL: CPT | Mod: TC

## 2017-06-09 RX ORDER — IRBESARTAN 300 MG/1
300 TABLET ORAL NIGHTLY
Qty: 90 TABLET | Refills: 3 | Status: SHIPPED | OUTPATIENT
Start: 2017-06-09 | End: 2017-07-03 | Stop reason: SDUPTHER

## 2017-06-09 RX ORDER — LABETALOL 300 MG/1
300 TABLET, FILM COATED ORAL 2 TIMES DAILY
Qty: 180 TABLET | Refills: 3 | Status: SHIPPED | OUTPATIENT
Start: 2017-06-09 | End: 2017-06-28 | Stop reason: SDUPTHER

## 2017-06-09 RX ORDER — PANTOPRAZOLE SODIUM 40 MG/1
40 TABLET, DELAYED RELEASE ORAL DAILY
Qty: 90 TABLET | Refills: 3 | Status: SHIPPED | OUTPATIENT
Start: 2017-06-09 | End: 2017-07-05 | Stop reason: SDUPTHER

## 2017-06-09 NOTE — TELEPHONE ENCOUNTER
Left a voice message for pt's pen needle RX on [facility] prescription line. LVM for patient informing her RX was called In.

## 2017-06-12 RX ORDER — ACETAMINOPHEN 160 MG/5ML
SUSPENSION, ORAL (FINAL DOSE FORM) ORAL
Qty: 300 EACH | Refills: 4 | Status: SHIPPED | OUTPATIENT
Start: 2017-06-12 | End: 2017-07-27 | Stop reason: ALTCHOICE

## 2017-06-12 NOTE — TELEPHONE ENCOUNTER
"----- Message from Jessi Paulino sent at 6/9/2017  4:27 PM CDT -----  Contact: Self [phone]  PT is out of needles. She rec'd her insulin but not the needles.    Pt is requesting a prescription refill for insulin syringe-needle U-100 (BD INSULIN SYRINGE ULTRA-FINE) 0.3 mL 30 gauge x 1/2" [facility] [address] [place] [address] AT [facility] [address] [phone] (Phone)  [phone] (Fax)        "

## 2017-06-14 ENCOUNTER — CLINICAL SUPPORT (OUTPATIENT)
Dept: AUDIOLOGY | Facility: CLINIC | Age: 67
End: 2017-06-14
Payer: MEDICARE

## 2017-06-14 ENCOUNTER — CLINICAL SUPPORT (OUTPATIENT)
Dept: DIABETES | Facility: CLINIC | Age: 67
End: 2017-06-14
Payer: MEDICARE

## 2017-06-14 DIAGNOSIS — Z79.4 TYPE 2 DIABETES MELLITUS WITH DIABETIC POLYNEUROPATHY, WITH LONG-TERM CURRENT USE OF INSULIN: ICD-10-CM

## 2017-06-14 DIAGNOSIS — H90.3 SENSORINEURAL HEARING LOSS, BILATERAL: Primary | ICD-10-CM

## 2017-06-14 DIAGNOSIS — E11.42 TYPE 2 DIABETES MELLITUS WITH DIABETIC POLYNEUROPATHY, WITH LONG-TERM CURRENT USE OF INSULIN: ICD-10-CM

## 2017-06-14 PROCEDURE — 92557 COMPREHENSIVE HEARING TEST: CPT | Mod: PBBFAC | Performed by: AUDIOLOGIST

## 2017-06-14 PROCEDURE — 99999 PR PBB SHADOW E&M-EST. PATIENT-LVL I: CPT | Mod: PBBFAC,,,

## 2017-06-14 PROCEDURE — 99211 OFF/OP EST MAY X REQ PHY/QHP: CPT | Mod: PBBFAC,27

## 2017-06-14 PROCEDURE — 99999 PR PBB SHADOW E&M-EST. PATIENT-LVL III: CPT | Mod: PBBFAC,,,

## 2017-06-14 PROCEDURE — 92567 TYMPANOMETRY: CPT | Mod: PBBFAC | Performed by: AUDIOLOGIST

## 2017-06-14 NOTE — PROGRESS NOTES
"Diabetes Education  Author: Tamar Yates RD, CDE  Date: 6/14/2017    Diabetes Education Visit  Diabetes Education Record Assessment/Progress: Initial (Referred by HRA, follows with ENDO)    Diabetes Type  Diabetes Type : Type II    Diabetes History  Diabetes Diagnosis: >10 years    Nutrition  Meal Planning: 3 meals per day, snacks between meal, water, drinks regular soda, artificial sweeteners, eats out often (milk and juice, tea w/ sw. and low)    Monitoring   Self Monitoring :  (Checks BG 4 times a day)  Blood Glucose Logs: No    Exercise   Exercise Type:  ("Limited")    Current Diabetes Treatment   Current Treatment: Oral Medication, Insulin (See medcard)    Social History  Preferred Learning Method: Face to Face, Demonstration, Hands On, Reading Materials  Primary Support: Self  Smoking Status: Never a Smoker  Alcohol Use: Never                             Barriers to Change  Barriers to Change: None  Learning Challenges : None    Readiness to Learn   Readiness to Learn : Acceptance    Cultural Influences  Cultural Influences: No    Diabetes Education Assessment/Progress  Acute Complications (preventing, detecting, and treating acute complications): Instructed, Discussion, Individual Session, Written Materials Provided  Chronic Complications (preventing, detecting, and treating chronic complications): Instructed, Discussion, Individual Session, Written Materials Provided  Diabetes Disease Process (diabetes disease process and treatment options): Instructed, Discussion, Individual Session, Written Materials Provided  Nutrition (Incorporating nutritional management into one's lifestyle): Instructed, Discussion, Individual Session, Written Materials Provided  Physical Activity (incorporating physical activity into one's lifestyle): Instructed, Discussion, Individual Session, Written Materials Provided  Medications (states correct name, dose, onset, peak, duration, side effects & timing of meds): Instructed, " Discussion, Individual Session, Written Materials Provided  Monitoring (monitoring blood glucose/other parameters & using results): Instructed, Discussion, Individual Session, Written Materials Provided  Goal Setting and Problem Solving (verbalizes behavior change strategies & sets realistic goals): Instructed, Discussion, Individual Session  Behavior Change (developing personal strategies to health & behavior change): Instructed, Discussion, Individual Session  Psychosocial Issues (developing personal srategies to address psychosocial concerns): Instructed, Discussion, Individual Session    Goals  Healthy Eating: Set (Eliminate sugary beverages)  Start Date: 06/14/17  Target Date: 09/14/17         Diabetes Care Plan/Intervention  Education Plan/Intervention: Individual Follow-Up DSMT (ENDO follow up 7/11/17)    Diabetes Meal Plan  Carbohydrate Per Meal: 45-60g  Carbohydrate Per Snack : 7-15g    Education Units of Time   Time Spent: 45 min      Health Maintenance Topics with due status: Not Due       Topic Last Completion Date    Colonoscopy 04/03/2008    Mammogram 10/11/2016    Eye Exam 10/18/2016    Influenza Vaccine 12/14/2016    Hemoglobin A1c 03/21/2017    DEXA SCAN 06/09/2017     Health Maintenance Due   Topic Date Due    Lipid Panel  04/29/2017    Foot Exam  07/28/2017

## 2017-06-22 DIAGNOSIS — T78.40XD ALLERGY, SUBSEQUENT ENCOUNTER: ICD-10-CM

## 2017-06-22 RX ORDER — MINERAL OIL
ENEMA (ML) RECTAL
Qty: 30 TABLET | Refills: 0 | Status: SHIPPED | OUTPATIENT
Start: 2017-06-22 | End: 2017-07-20 | Stop reason: SDUPTHER

## 2017-06-29 RX ORDER — LABETALOL 300 MG/1
TABLET, FILM COATED ORAL
Qty: 120 TABLET | Refills: 1 | Status: SHIPPED | OUTPATIENT
Start: 2017-06-29 | End: 2017-10-19 | Stop reason: SDUPTHER

## 2017-06-30 ENCOUNTER — TELEPHONE (OUTPATIENT)
Dept: INTERNAL MEDICINE | Facility: CLINIC | Age: 67
End: 2017-06-30

## 2017-06-30 ENCOUNTER — LAB VISIT (OUTPATIENT)
Dept: LAB | Facility: HOSPITAL | Age: 67
End: 2017-06-30
Attending: NURSE PRACTITIONER
Payer: MEDICARE

## 2017-06-30 DIAGNOSIS — E78.5 DYSLIPIDEMIA: Chronic | ICD-10-CM

## 2017-06-30 DIAGNOSIS — E66.01 MORBID OBESITY DUE TO EXCESS CALORIES: Chronic | ICD-10-CM

## 2017-06-30 DIAGNOSIS — D50.9 IRON DEFICIENCY ANEMIA, UNSPECIFIED IRON DEFICIENCY ANEMIA TYPE: Chronic | ICD-10-CM

## 2017-06-30 DIAGNOSIS — Z79.4 TYPE 2 DIABETES MELLITUS WITH DIABETIC POLYNEUROPATHY, WITH LONG-TERM CURRENT USE OF INSULIN: ICD-10-CM

## 2017-06-30 DIAGNOSIS — I10 ESSENTIAL HYPERTENSION: ICD-10-CM

## 2017-06-30 DIAGNOSIS — K21.9 GASTROESOPHAGEAL REFLUX DISEASE WITHOUT ESOPHAGITIS: ICD-10-CM

## 2017-06-30 DIAGNOSIS — N18.30 CKD (CHRONIC KIDNEY DISEASE) STAGE 3, GFR 30-59 ML/MIN: Chronic | ICD-10-CM

## 2017-06-30 DIAGNOSIS — E11.42 TYPE 2 DIABETES MELLITUS WITH DIABETIC POLYNEUROPATHY, WITH LONG-TERM CURRENT USE OF INSULIN: ICD-10-CM

## 2017-06-30 DIAGNOSIS — B37.2 CANDIDIASIS, CUTANEOUS: ICD-10-CM

## 2017-06-30 DIAGNOSIS — M1A.00X0 IDIOPATHIC CHRONIC GOUT WITHOUT TOPHUS, UNSPECIFIED SITE: ICD-10-CM

## 2017-06-30 DIAGNOSIS — E55.9 VITAMIN D INSUFFICIENCY: ICD-10-CM

## 2017-06-30 LAB
ALBUMIN SERPL BCP-MCNC: 3.4 G/DL
ALP SERPL-CCNC: 110 U/L
ALT SERPL W/O P-5'-P-CCNC: 10 U/L
ANION GAP SERPL CALC-SCNC: 10 MMOL/L
AST SERPL-CCNC: 16 U/L
BILIRUB SERPL-MCNC: 0.2 MG/DL
BUN SERPL-MCNC: 29 MG/DL
CALCIUM SERPL-MCNC: 9.7 MG/DL
CHLORIDE SERPL-SCNC: 105 MMOL/L
CHOLEST/HDLC SERPL: 3.7 {RATIO}
CO2 SERPL-SCNC: 25 MMOL/L
CREAT SERPL-MCNC: 1.8 MG/DL
EST. GFR  (AFRICAN AMERICAN): 33.1 ML/MIN/1.73 M^2
EST. GFR  (NON AFRICAN AMERICAN): 28.7 ML/MIN/1.73 M^2
FERRITIN SERPL-MCNC: 110 NG/ML
GLUCOSE SERPL-MCNC: 118 MG/DL
HDL/CHOLESTEROL RATIO: 26.7 %
HDLC SERPL-MCNC: 146 MG/DL
HDLC SERPL-MCNC: 39 MG/DL
IRON SERPL-MCNC: 36 UG/DL
LDLC SERPL CALC-MCNC: 77.2 MG/DL
NONHDLC SERPL-MCNC: 107 MG/DL
POTASSIUM SERPL-SCNC: 4.2 MMOL/L
PROT SERPL-MCNC: 6.6 G/DL
SATURATED IRON: 11 %
SODIUM SERPL-SCNC: 140 MMOL/L
TOTAL IRON BINDING CAPACITY: 326 UG/DL
TRANSFERRIN SERPL-MCNC: 220 MG/DL
TRIGL SERPL-MCNC: 149 MG/DL
TSH SERPL DL<=0.005 MIU/L-ACNC: 1.59 UIU/ML

## 2017-06-30 PROCEDURE — 83036 HEMOGLOBIN GLYCOSYLATED A1C: CPT

## 2017-06-30 PROCEDURE — 86334 IMMUNOFIX E-PHORESIS SERUM: CPT | Mod: 26,,, | Performed by: PATHOLOGY

## 2017-06-30 PROCEDURE — 86255 FLUORESCENT ANTIBODY SCREEN: CPT | Mod: 91

## 2017-06-30 PROCEDURE — 87340 HEPATITIS B SURFACE AG IA: CPT

## 2017-06-30 PROCEDURE — 84443 ASSAY THYROID STIM HORMONE: CPT

## 2017-06-30 PROCEDURE — 84165 PROTEIN E-PHORESIS SERUM: CPT

## 2017-06-30 PROCEDURE — 80053 COMPREHEN METABOLIC PANEL: CPT

## 2017-06-30 PROCEDURE — 86334 IMMUNOFIX E-PHORESIS SERUM: CPT

## 2017-06-30 PROCEDURE — 86706 HEP B SURFACE ANTIBODY: CPT

## 2017-06-30 PROCEDURE — 84165 PROTEIN E-PHORESIS SERUM: CPT | Mod: 26,,, | Performed by: PATHOLOGY

## 2017-06-30 PROCEDURE — 82728 ASSAY OF FERRITIN: CPT

## 2017-06-30 PROCEDURE — 86038 ANTINUCLEAR ANTIBODIES: CPT

## 2017-06-30 PROCEDURE — 86803 HEPATITIS C AB TEST: CPT

## 2017-06-30 PROCEDURE — 83540 ASSAY OF IRON: CPT

## 2017-06-30 PROCEDURE — 80061 LIPID PANEL: CPT

## 2017-07-01 LAB
ESTIMATED AVG GLUCOSE: 235 MG/DL
HBA1C MFR BLD HPLC: 9.8 %

## 2017-07-03 DIAGNOSIS — I10 ESSENTIAL HYPERTENSION: ICD-10-CM

## 2017-07-03 LAB
ALBUMIN SERPL ELPH-MCNC: 3.72 G/DL
ALPHA1 GLOB SERPL ELPH-MCNC: 0.29 G/DL
ALPHA2 GLOB SERPL ELPH-MCNC: 0.76 G/DL
ANA SER QL IF: NORMAL
B-GLOBULIN SERPL ELPH-MCNC: 0.7 G/DL
GAMMA GLOB SERPL ELPH-MCNC: 0.83 G/DL
HBV SURFACE AB SER-ACNC: NEGATIVE M[IU]/ML
HBV SURFACE AG SERPL QL IA: NEGATIVE
HCV AB SERPL QL IA: NEGATIVE
INTERPRETATION SERPL IFE-IMP: NORMAL
PATHOLOGIST INTERPRETATION IFE: NORMAL
PATHOLOGIST INTERPRETATION SPE: NORMAL
PROT SERPL-MCNC: 6.3 G/DL

## 2017-07-03 RX ORDER — IRBESARTAN 300 MG/1
300 TABLET ORAL NIGHTLY
Qty: 90 TABLET | Refills: 2 | Status: CANCELLED | OUTPATIENT
Start: 2017-07-03

## 2017-07-03 RX ORDER — IRBESARTAN 300 MG/1
300 TABLET ORAL NIGHTLY
Qty: 90 TABLET | Refills: 2 | Status: SHIPPED | OUTPATIENT
Start: 2017-07-03 | End: 2017-11-02 | Stop reason: SDUPTHER

## 2017-07-05 DIAGNOSIS — K21.9 GASTROESOPHAGEAL REFLUX DISEASE, ESOPHAGITIS PRESENCE NOT SPECIFIED: ICD-10-CM

## 2017-07-05 RX ORDER — PANTOPRAZOLE SODIUM 40 MG/1
40 TABLET, DELAYED RELEASE ORAL DAILY
Qty: 90 TABLET | Refills: 3 | Status: SHIPPED | OUTPATIENT
Start: 2017-07-05 | End: 2018-03-07

## 2017-07-07 RX ORDER — FERROUS SULFATE 325(65) MG
325 TABLET ORAL 2 TIMES DAILY
Qty: 180 TABLET | Refills: 2 | COMMUNITY
Start: 2017-07-07 | End: 2017-10-05

## 2017-07-11 ENCOUNTER — HOSPITAL ENCOUNTER (OUTPATIENT)
Dept: RADIOLOGY | Facility: HOSPITAL | Age: 67
Discharge: HOME | End: 2017-07-11
Attending: INTERNAL MEDICINE
Payer: MEDICARE

## 2017-07-11 ENCOUNTER — OFFICE VISIT (OUTPATIENT)
Dept: ENDOCRINOLOGY | Facility: CLINIC | Age: 67
End: 2017-07-11
Payer: MEDICARE

## 2017-07-11 ENCOUNTER — OFFICE VISIT (OUTPATIENT)
Dept: INTERNAL MEDICINE | Facility: CLINIC | Age: 67
End: 2017-07-11
Payer: MEDICARE

## 2017-07-11 VITALS
SYSTOLIC BLOOD PRESSURE: 119 MMHG | DIASTOLIC BLOOD PRESSURE: 78 MMHG | WEIGHT: 293 LBS | HEIGHT: 60 IN | BODY MASS INDEX: 57.52 KG/M2 | HEART RATE: 72 BPM

## 2017-07-11 VITALS — WEIGHT: 293 LBS | BODY MASS INDEX: 57.52 KG/M2 | HEIGHT: 60 IN

## 2017-07-11 DIAGNOSIS — I10 ESSENTIAL HYPERTENSION: ICD-10-CM

## 2017-07-11 DIAGNOSIS — E66.01 MORBID OBESITY DUE TO EXCESS CALORIES: Chronic | ICD-10-CM

## 2017-07-11 DIAGNOSIS — E78.5 DYSLIPIDEMIA: Chronic | ICD-10-CM

## 2017-07-11 DIAGNOSIS — B37.2 CANDIDIASIS, CUTANEOUS: ICD-10-CM

## 2017-07-11 DIAGNOSIS — K21.9 GASTROESOPHAGEAL REFLUX DISEASE WITHOUT ESOPHAGITIS: ICD-10-CM

## 2017-07-11 DIAGNOSIS — Z79.4 TYPE 2 DIABETES MELLITUS WITH DIABETIC POLYNEUROPATHY, WITH LONG-TERM CURRENT USE OF INSULIN: Primary | ICD-10-CM

## 2017-07-11 DIAGNOSIS — E11.42 TYPE 2 DIABETES MELLITUS WITH DIABETIC POLYNEUROPATHY, WITH LONG-TERM CURRENT USE OF INSULIN: Primary | ICD-10-CM

## 2017-07-11 DIAGNOSIS — G63 POLYNEUROPATHY ASSOCIATED WITH UNDERLYING DISEASE: ICD-10-CM

## 2017-07-11 DIAGNOSIS — M1A.00X0 IDIOPATHIC CHRONIC GOUT WITHOUT TOPHUS, UNSPECIFIED SITE: ICD-10-CM

## 2017-07-11 DIAGNOSIS — E55.9 VITAMIN D INSUFFICIENCY: ICD-10-CM

## 2017-07-11 DIAGNOSIS — N18.30 CKD (CHRONIC KIDNEY DISEASE) STAGE 3, GFR 30-59 ML/MIN: Chronic | ICD-10-CM

## 2017-07-11 DIAGNOSIS — E11.3599 PROLIFERATIVE DIABETIC RETINOPATHY ASSOCIATED WITH TYPE 2 DIABETES MELLITUS, MACULAR EDEMA PRESENCE UNSPECIFIED, UNSPECIFIED LATERALITY: ICD-10-CM

## 2017-07-11 DIAGNOSIS — D50.9 IRON DEFICIENCY ANEMIA, UNSPECIFIED IRON DEFICIENCY ANEMIA TYPE: Chronic | ICD-10-CM

## 2017-07-11 DIAGNOSIS — G47.33 OBSTRUCTIVE SLEEP APNEA: Chronic | ICD-10-CM

## 2017-07-11 PROCEDURE — 1126F AMNT PAIN NOTED NONE PRSNT: CPT | Mod: ,,, | Performed by: FAMILY MEDICINE

## 2017-07-11 PROCEDURE — 1159F MED LIST DOCD IN RCRD: CPT | Mod: ,,, | Performed by: FAMILY MEDICINE

## 2017-07-11 PROCEDURE — 76770 US EXAM ABDO BACK WALL COMP: CPT | Mod: 26,59,GC, | Performed by: RADIOLOGY

## 2017-07-11 PROCEDURE — 3046F HEMOGLOBIN A1C LEVEL >9.0%: CPT | Mod: ,,, | Performed by: FAMILY MEDICINE

## 2017-07-11 PROCEDURE — 99999 PR PBB SHADOW E&M-EST. PATIENT-LVL V: CPT | Mod: PBBFAC,,, | Performed by: NURSE PRACTITIONER

## 2017-07-11 PROCEDURE — 3066F NEPHROPATHY DOC TX: CPT | Mod: ,,, | Performed by: NURSE PRACTITIONER

## 2017-07-11 PROCEDURE — 99215 OFFICE O/P EST HI 40 MIN: CPT | Mod: S$PBB,,, | Performed by: FAMILY MEDICINE

## 2017-07-11 PROCEDURE — 99214 OFFICE O/P EST MOD 30 MIN: CPT | Mod: S$PBB,,, | Performed by: NURSE PRACTITIONER

## 2017-07-11 PROCEDURE — 99999 PR PBB SHADOW E&M-EST. PATIENT-LVL IV: CPT | Mod: PBBFAC,,, | Performed by: FAMILY MEDICINE

## 2017-07-11 PROCEDURE — 76770 US EXAM ABDO BACK WALL COMP: CPT | Mod: TC

## 2017-07-11 PROCEDURE — 3046F HEMOGLOBIN A1C LEVEL >9.0%: CPT | Mod: ,,, | Performed by: NURSE PRACTITIONER

## 2017-07-11 PROCEDURE — 1159F MED LIST DOCD IN RCRD: CPT | Mod: ,,, | Performed by: NURSE PRACTITIONER

## 2017-07-11 PROCEDURE — 1126F AMNT PAIN NOTED NONE PRSNT: CPT | Mod: ,,, | Performed by: NURSE PRACTITIONER

## 2017-07-11 PROCEDURE — 3066F NEPHROPATHY DOC TX: CPT | Mod: ,,, | Performed by: FAMILY MEDICINE

## 2017-07-11 PROCEDURE — 93975 VASCULAR STUDY: CPT | Mod: 26,GC,, | Performed by: RADIOLOGY

## 2017-07-11 PROCEDURE — 2022F DILAT RTA XM EVC RTNOPTHY: CPT | Mod: ,,, | Performed by: FAMILY MEDICINE

## 2017-07-11 RX ORDER — INSULIN GLARGINE 300 [IU]/ML
40 INJECTION, SOLUTION SUBCUTANEOUS DAILY
Qty: 1 SYRINGE | Refills: 0 | Status: SHIPPED | OUTPATIENT
Start: 2017-07-11 | End: 2017-07-11 | Stop reason: SDUPTHER

## 2017-07-11 RX ORDER — INSULIN ASPART 100 [IU]/ML
INJECTION, SOLUTION INTRAVENOUS; SUBCUTANEOUS
Qty: 4 BOX | Refills: 1 | Status: SHIPPED | OUTPATIENT
Start: 2017-07-11 | End: 2017-07-27 | Stop reason: ALTCHOICE

## 2017-07-11 RX ORDER — INSULIN GLARGINE 300 [IU]/ML
INJECTION, SOLUTION SUBCUTANEOUS
Qty: 4.5 ML | Refills: 0
Start: 2017-07-11 | End: 2017-07-27 | Stop reason: ALTCHOICE

## 2017-07-11 RX ORDER — INSULIN GLARGINE 300 U/ML
INJECTION, SOLUTION SUBCUTANEOUS
Qty: 4.5 ML | Refills: 0 | Status: SHIPPED | OUTPATIENT
Start: 2017-07-11 | End: 2017-07-11 | Stop reason: SDUPTHER

## 2017-07-11 NOTE — PROGRESS NOTES
"Subjective:       Patient ID: Annika Mac is a 66 y.o. female.    Chief Complaint: Follow-up  .Annika Mac 66 y.o. female is here for office visit to review care and physical exam, HERE dm CARE, WAS ON  u 500 WITH VERY POOR dm CONTROL FOR MANY REASONS, HERE HAVING TAKEN ADVISE TO RAISE tOUJEO DOSE TO 20 UN ITS, BUT HASN'T TITRATED HIGHER, "DIDN'T KNOW TO DO SO?"  nOTE DIET COULD BE MUCH BETTER.  NO DIET LOG, ONLY NOTE-PAD cbGS.  Of note has been eating vanilla wafers.      HPI  Review of Systems   Constitutional: Negative for activity change, fatigue, fever and unexpected weight change.   HENT: Negative for congestion, hearing loss, postnasal drip and rhinorrhea.    Eyes: Negative for redness and visual disturbance.   Respiratory: Negative for chest tightness, shortness of breath and wheezing.    Cardiovascular: Negative for chest pain, palpitations and leg swelling.   Gastrointestinal: Negative for abdominal distention.   Genitourinary: Negative for decreased urine volume, dysuria, flank pain, hematuria, pelvic pain and urgency.   Musculoskeletal: Negative for back pain, gait problem, joint swelling and neck stiffness.   Skin: Negative for color change, rash and wound.   Neurological: Negative for dizziness, syncope, weakness and headaches.   Psychiatric/Behavioral: Negative for behavioral problems, confusion and sleep disturbance. The patient is not nervous/anxious.        Objective:      Physical Exam   Constitutional: She is oriented to person, place, and time. She appears well-developed and well-nourished. No distress.   HENT:   Head: Normocephalic.   Mouth/Throat: No oropharyngeal exudate.   Eyes: EOM are normal. Pupils are equal, round, and reactive to light. No scleral icterus.   Neck: Neck supple. No JVD present. No thyromegaly present.   Cardiovascular: Normal rate, regular rhythm and normal heart sounds.  Exam reveals no gallop and no friction rub.    No murmur heard.  Pulmonary/Chest: Effort normal and " breath sounds normal. She has no wheezes. She has no rales.   Abdominal: Soft. Bowel sounds are normal. She exhibits no distension and no mass. There is no tenderness. There is no guarding.   Musculoskeletal: Normal range of motion. She exhibits no edema.   Lymphadenopathy:     She has no cervical adenopathy.   Neurological: She is alert and oriented to person, place, and time. She has normal reflexes. She displays normal reflexes. No cranial nerve deficit. She exhibits normal muscle tone.   Skin: Skin is warm. No rash noted. No erythema.   Psychiatric: She has a normal mood and affect. Thought content normal.       Assessment:       No diagnosis found.    Plan:       Annika was seen today for follow-up.    Diagnoses and all orders for this visit:    Type 2 diabetes mellitus with diabetic polyneuropathy, with long-term current use of insulin  - Discussed titration, diet discussed also rtc one week  Obstructive sleep apnea  - diet discussed  Essential hypertension  - controled

## 2017-07-11 NOTE — PROGRESS NOTES
"CC: This 66 y.o. female presents for management of Diabetes Mellitus     HPI: Diagnosed with T2DM in July 1987 when c/o polydipsia and polyuria. Started oral agents then NPH and Regular insulin. Converted to MDI with Lantus and Novolog in 2/2006 after knee surgery. D/c TZD r/t weight gain 7/08 and added Symlin. Stopped Symlin 2/09. Januvia added 2/10. D/C Januvia 12/10 r/t cost; resumed though pt assistance in 2016. Converted to U500 in 7/10.   Received DM Education 1/22/08.   11/2013 she experienced severe hypoglycemia at home,  called 911, "nonresponsive" so brought to local ED for hypoglycemia BG 30s in 11/2013.     Medical conditions also include obesity, RUFINA, diastolic dysfunction, h/o stroke, PAD, and CKD 3.  Follows with cardiology, vascular medicine and nephrology.   Also has chronic back pain, which limits activity and contributes to wt gain. Has followed with physical medicine.   Has chronic cough 2/2 GERD.  DM complicated with PDR with ME, PN.    She was last seen 3/2017 and was converted to U500 pen formulation following her visit.   She presents with logs which indicate improvement while on U500 pen, though still above goal and still with variability r/t diet. Bgs 90s-200s, some 300s.   Today, she reports U500 d/c'd by PCP 6/17. She is taking Toujeo only, no prandial insulin. BGs have increased by logs. She reports her Toujeo dose was decreased today to 40 units (from 50U) today with instruction to augment by 5 units every three days to goal of .    CURRENT DM MEDS: Toujeo 40 units (decreased from 50U today) and Januvia 50mg QD.  Denies missed doses.  Today she reports Medicare Extra Help**    Logs:  BG 4/d day  Fasting 100s-300s  TUCKER 100-300s  -400s  HS 200s-400s    + Hypoglycemia, decreased frequency, AM still.   Can recognize and treat symtpoms.   Monitoring BG at home 3 x daily before meals.      Variable diet as previosly noted and she has seen DM ed for this.   Discussed CHO " limitation with PCP today.  Drinks water, unsweet tea.   No formal exercise.     Standards of Care:  Eye exam: 10/16  Podiatry: 5/2017    ROS:   Gen: Appetite good, + 5# wt loss, denies fatigue and weakness.  Skin: No rashes, no hair changes, left forefinger darkening at DIP, reviewed with PCP today.  Eyes: Denies visual disturbances  Resp: no SOB or MCFADDEN, no cough today  Cardiac: No palpitations, chest pain, no edema   GI: No nausea or vomiting, diarrhea, constipation, or abdominal pain.  MS/Neuro: Denies numbness/ tingling in BLE; Gait steady, speech clear  Chronic back pain.   Psych: Denies drug/ETOH abuse, no hx of depression.  Other systems: negative.    PE: /78 (BP Location: Right arm, Patient Position: Sitting)   Pulse 72   Ht 5' (1.524 m)   Wt (!) 139.7 kg (308 lb)   BMI 60.15 kg/m²   GENERAL: Well developed, well nourished.  PSYCH: AAOx3, appropriate mood and affect, pleasant expression, conversant, appears relaxed, well groomed.   EYES: Conjunctiva, corneas clear  NECK: Supple, trachea midline.  CHEST: Resp even and unlabored  CARDIAC: RRR  ABDOMEN: Soft, non-tender, non-distended; +BSs x4  VASCULAR: DP pulses +2/4 bilaterally, mild edema.  bilateral radial pulse intact, left hand cap refill intact, left forefinger: no redness, swelling, tenderness, warmth or drainage, integument is intact.   NEURO: Gait steady  SKINSkin warm and dry, + acanthosis nigracans.  FEET:  Monofilament sensation intact bilaterally. No open wounds or lesions. Nails are thickened, painted today.    Hemoglobin A1C   Date Value Ref Range Status   06/30/2017 9.8 (H) 4.0 - 5.6 % Final     Comment:     According to ADA guidelines, hemoglobin A1c <7.0% represents  optimal control in non-pregnant diabetic patients. Different  metrics may apply to specific patient populations.   Standards of Medical Care in Diabetes-2016.  For the purpose of screening for the presence of diabetes:  <5.7%     Consistent with the absence of  diabetes  5.7-6.4%  Consistent with increasing risk for diabetes   (prediabetes)  >or=6.5%  Consistent with diabetes  Currently, no consensus exists for use of hemoglobin A1c  for diagnosis of diabetes for children.  This Hemoglobin A1c assay has significant interference with fetal   hemoglobin   (HbF). The results are invalid for patients with abnormal amounts of   HbF,   including those with known Hereditary Persistence   of Fetal Hemoglobin. Heterozygous hemoglobin variants (HbAS, HbAC,   HbAD, HbAE, HbA2) do not significantly interfere with this assay;   however, presence of multiple variants in a sample may impact the %   interference.     03/21/2017 9.4 (H) 4.5 - 6.2 % Final     Comment:     According to ADA guidelines, hemoglobin A1C <7.0% represents  optimal control in non-pregnant diabetic patients.  Different  metrics may apply to specific populations.   Standards of Medical Care in Diabetes - 2016.  For the purpose of screening for the presence of diabetes:  <5.7%     Consistent with the absence of diabetes  5.7-6.4%  Consistent with increasing risk for diabetes   (prediabetes)  >or=6.5%  Consistent with diabetes  Currently no consensus exists for use of hemoglobin A1C  for diagnosis of diabetes for children.     11/25/2016 9.1 (H) 4.5 - 6.2 % Final     Comment:     According to ADA guidelines, hemoglobin A1C <7.0% represents  optimal control in non-pregnant diabetic patients.  Different  metrics may apply to specific populations.   Standards of Medical Care in Diabetes - 2016.  For the purpose of screening for the presence of diabetes:  <5.7%     Consistent with the absence of diabetes  5.7-6.4%  Consistent with increasing risk for diabetes   (prediabetes)  >or=6.5%  Consistent with diabetes  Currently no consensus exists for use of hemoglobin A1C  for diagnosis of diabetes for children.       ASSESSMENT and PLAN:    1. T2DM: uncontrolled, with neuropathy, retinopathy, nephropathy.   A1c uncontrolled on  basal only. Reports Medicare Extra Help today. OK to continue analog trial with the addition of prandial insulin.   Will continue plan for Toujeo 40 units. Stressed that she must titrate every 5 days with Toujeo dynamics and also given h/o overnight hypoglycemia.   Add in Novolog at 13-13-7U with correction 180/50. Dosing based on basal regimen with decreased dinner dosing to prevent overnight hypoglycemia.   Reviewed MDI therapy at length. Logs provided. She is able to verbalize use.  Continue Januvia 50mg QAM.     BG Ac/HS. Send logs in 10 days. Call sooner for hypoglycemia.   Hypoglycemia management reviewed. Carry glucose.     Discussed A1c goals.     - takes ASA, ACEi, statin     2. PDR with macular edema - optimize BG. Continue follow with Ophth.      3. CKD stage III - GFR 35.7, on ACEi therapy. No metformin or SLGT2i. Saw nephrology 6/2016     4. Peripheral neuropathy - on Lyrica and Cymbalta with relief. Off gabapentin.      5. HLP - LDL trending down. Continue statin.      6. Morbid obesity  Body mass index is 60.15 kg/m².  Increases insulin resistance. Chronic pain limits physical activity. Has discussed diet with DM ed. Not interested in bariatrics. May be interested in wt management moving forward. Amenable to DM to review current diet and reccs.     7. HTN - Controlled. On ARB.       F/U with PCP if skin darkening (left DIP) does not resolve.    Orders Placed This Encounter   Procedures    Hemoglobin A1c    Ambulatory Referral to Diabetes Education

## 2017-07-14 LAB
ANCA AB TITR SER IF: NORMAL TITER
P-ANCA TITR SER IF: NORMAL TITER

## 2017-07-19 RX ORDER — ALLOPURINOL 100 MG/1
100 TABLET ORAL DAILY
Qty: 30 TABLET | Refills: 3 | Status: SHIPPED | OUTPATIENT
Start: 2017-07-19 | End: 2017-11-27 | Stop reason: SDUPTHER

## 2017-07-20 DIAGNOSIS — T78.40XD ALLERGY, SUBSEQUENT ENCOUNTER: ICD-10-CM

## 2017-07-20 RX ORDER — MINERAL OIL
ENEMA (ML) RECTAL
Qty: 30 TABLET | Refills: 0 | Status: SHIPPED | OUTPATIENT
Start: 2017-07-20 | End: 2017-08-20 | Stop reason: SDUPTHER

## 2017-07-27 ENCOUNTER — CLINICAL SUPPORT (OUTPATIENT)
Dept: DIABETES | Facility: CLINIC | Age: 67
End: 2017-07-27
Payer: MEDICARE

## 2017-07-27 ENCOUNTER — OFFICE VISIT (OUTPATIENT)
Dept: CARDIOLOGY | Facility: CLINIC | Age: 67
End: 2017-07-27
Payer: MEDICARE

## 2017-07-27 ENCOUNTER — TELEPHONE (OUTPATIENT)
Dept: ENDOCRINOLOGY | Facility: HOSPITAL | Age: 67
End: 2017-07-27

## 2017-07-27 VITALS
WEIGHT: 293 LBS | BODY MASS INDEX: 48.82 KG/M2 | SYSTOLIC BLOOD PRESSURE: 131 MMHG | HEIGHT: 65 IN | HEART RATE: 73 BPM | DIASTOLIC BLOOD PRESSURE: 60 MMHG

## 2017-07-27 DIAGNOSIS — Z79.4 TYPE 2 DIABETES MELLITUS WITH DIABETIC POLYNEUROPATHY, WITH LONG-TERM CURRENT USE OF INSULIN: ICD-10-CM

## 2017-07-27 DIAGNOSIS — I51.89 DIASTOLIC DYSFUNCTION: Chronic | ICD-10-CM

## 2017-07-27 DIAGNOSIS — E66.01 MORBID OBESITY DUE TO EXCESS CALORIES: Chronic | ICD-10-CM

## 2017-07-27 DIAGNOSIS — I10 ESSENTIAL HYPERTENSION: Primary | ICD-10-CM

## 2017-07-27 DIAGNOSIS — E78.5 DYSLIPIDEMIA: Chronic | ICD-10-CM

## 2017-07-27 DIAGNOSIS — E11.42 TYPE 2 DIABETES MELLITUS WITH DIABETIC POLYNEUROPATHY, WITH LONG-TERM CURRENT USE OF INSULIN: ICD-10-CM

## 2017-07-27 PROCEDURE — 1125F AMNT PAIN NOTED PAIN PRSNT: CPT | Mod: ,,, | Performed by: INTERNAL MEDICINE

## 2017-07-27 PROCEDURE — 1159F MED LIST DOCD IN RCRD: CPT | Mod: ,,, | Performed by: INTERNAL MEDICINE

## 2017-07-27 PROCEDURE — 99213 OFFICE O/P EST LOW 20 MIN: CPT | Mod: PBBFAC | Performed by: INTERNAL MEDICINE

## 2017-07-27 PROCEDURE — 99999 PR PBB SHADOW E&M-EST. PATIENT-LVL III: CPT | Mod: PBBFAC,,, | Performed by: INTERNAL MEDICINE

## 2017-07-27 PROCEDURE — 99213 OFFICE O/P EST LOW 20 MIN: CPT | Mod: S$PBB,,, | Performed by: INTERNAL MEDICINE

## 2017-07-27 RX ORDER — PEN NEEDLE, DIABETIC 30 GX3/16"
NEEDLE, DISPOSABLE MISCELLANEOUS
Qty: 100 EACH | Refills: 12 | Status: SHIPPED | OUTPATIENT
Start: 2017-07-27 | End: 2018-08-06 | Stop reason: SDUPTHER

## 2017-07-27 NOTE — TELEPHONE ENCOUNTER
BG uncontrolled on MDI.  Pt previously overwhelmed with insulin changes, so we continued with analogs at her last visit. Today she agrees with return to concentrated insulin.   STOP Toujeo and STOP Humalog.   Resume Humulin U500 PEN at previous doses: 85 units with breakfast, 65 units with lunch, 20 units with dinner.   F/U with me next available. Call sooner if BG remain markedly elevated.   Push hydration, ketone strips for current hyperglycemia.

## 2017-07-27 NOTE — PROGRESS NOTES
Subjective:   Patient ID:  Annika Mac is a 66 y.o. female who presents for follow-up of Essential hypertension      HPI:   Annika Mac presents for follow up of Hypertension. BP at home has been in the 120-130/ range. She has had further coughing or  Syncope. Recent problem with Diabetic control. She is limited due to painful neuropathy. She has diastolic dysfunction but has not been bothered with SOB.  Annika Mac denies chest pain,  or palpitations. Annika Mac has dyslipidemia  on moderate intensity statin therapy At goal      Review of Systems   Constitution: Negative for weakness, malaise/fatigue, weight gain and weight loss.   HENT: Negative for headaches.    Eyes: Negative for blurred vision.   Cardiovascular: Negative for chest pain, claudication, cyanosis, dyspnea on exertion, irregular heartbeat, leg swelling, near-syncope, orthopnea, palpitations, paroxysmal nocturnal dyspnea and syncope.   Respiratory: Negative for cough, shortness of breath and wheezing.    Musculoskeletal: Positive for joint pain. Negative for falls and myalgias.   Gastrointestinal: Negative for abdominal pain, heartburn, nausea and vomiting.   Genitourinary: Negative for nocturia.   Neurological: Positive for paresthesias. Negative for brief paralysis, dizziness, focal weakness and numbness.   Psychiatric/Behavioral: Negative for altered mental status.       Current Outpatient Prescriptions   Medication Sig    albuterol 90 mcg/actuation inhaler Inhale 2 puffs into the lungs every 6 (six) hours as needed for Wheezing.    allopurinol (ZYLOPRIM) 100 MG tablet Take 1 tablet (100 mg total) by mouth once daily.    aspirin 81 MG Chew Take 1 tablet (81 mg total) by mouth once daily.    azelastine (ASTELIN) 137 mcg (0.1 %) nasal spray 1 spray (137 mcg total) by Nasal route 2 (two) times daily.    blood-glucose meter kit Use as instructed, true test/result meter    colchicine 0.6 mg tablet Take 1 tablet (0.6 mg total) by mouth once  "daily.    duloxetine (CYMBALTA) 30 MG capsule Take 1 capsule (30 mg total) by mouth once daily.    ferrous sulfate 325 mg (65 mg iron) Tab tablet Take 1 tablet (325 mg total) by mouth 2 (two) times daily.    fexofenadine (ALLEGRA) 180 MG tablet TAKE 1 TABLET BY MOUTH ONCE DAILY    fluticasone (FLONASE) 50 mcg/actuation nasal spray 2 sprays by Each Nare route once daily.    furosemide (LASIX) 20 MG tablet Take 1 tablet by mouth once a day    hydrocodone-acetaminophen 10-325mg (NORCO)  mg Tab Take 1 tablet by mouth every 8 (eight) hours as needed for Pain.    insulin regular hum U-500 conc (HUMULIN R U-500, CONC, KWIKPEN) 500 unit/mL (3 mL) InPn Inject 20-85 Units into the skin 3 (three) times daily before meals.    irbesartan (AVAPRO) 300 MG tablet Take 1 tablet (300 mg total) by mouth every evening.    labetalol (NORMODYNE) 300 MG tablet Take 1 tablet by mouth two  times daily    lancets 31 gauge Misc 1 lancet by Misc.(Non-Drug; Combo Route) route 4 (four) times daily.    levocetirizine (XYZAL) 5 MG tablet Take 1 tablet (5 mg total) by mouth every evening.    montelukast (SINGULAIR) 10 mg tablet TAKE 1 TABLET(10 MG) BY MOUTH EVERY EVENING    nystatin (MYCOSTATIN) powder Apply topically 3 (three) times daily.    pantoprazole (PROTONIX) 40 MG tablet Take 1 tablet (40 mg total) by mouth once daily.    pen needle, diabetic 32 gauge x 5/32" Ndle Use with multiple daily insulin injections    pregabalin (LYRICA) 100 MG capsule Take 1 capsule (100 mg total) by mouth 2 (two) times daily.    simvastatin (ZOCOR) 40 MG tablet Take 1 tablet (40 mg total) by mouth every evening.    SITagliptan (JANUVIA) 50 MG Tab Take 1 tablet (50 mg total) by mouth once daily.    spironolactone (ALDACTONE) 25 MG tablet Take 1 tablet by mouth once a day    TRUETEST TEST STRIPS Strp 1 strip by Misc.(Non-Drug; Combo Route) route 4 (four) times daily.     No current facility-administered medications for this visit.  " "    Objective:   Physical Exam   Constitutional: She is oriented to person, place, and time. She appears well-developed. No distress.   /60 (BP Location: Left arm, Patient Position: Sitting, BP Method: Automatic)   Pulse 73   Ht 5' 5" (1.651 m)   Wt (!) 139.8 kg (308 lb 3.3 oz)   BMI 51.29 kg/m²    HENT:   Head: Normocephalic.   Eyes: Conjunctivae are normal. Pupils are equal, round, and reactive to light. No scleral icterus.   Neck: Neck supple. No JVD present. No thyromegaly present.   Cardiovascular: Normal rate, regular rhythm and intact distal pulses.  PMI is not displaced.  Exam reveals no gallop and no friction rub.    Murmur heard.   Medium-pitched midsystolic murmur is present with a grade of 2/6  at the lower left sternal border  Pulmonary/Chest: Effort normal and breath sounds normal. No respiratory distress. She has no wheezes. She has no rales.   Abdominal: Soft. She exhibits no distension. There is no splenomegaly or hepatomegaly. There is no tenderness.   Musculoskeletal: She exhibits no edema or tenderness.   Gait normal   Neurological: She is alert and oriented to person, place, and time.   Skin: Skin is warm and dry. She is not diaphoretic.   Psychiatric: She has a normal mood and affect. Her behavior is normal.       Lab Results   Component Value Date     06/30/2017    K 4.2 06/30/2017     06/30/2017    CO2 25 06/30/2017    BUN 29 (H) 06/30/2017    CREATININE 1.8 (H) 06/30/2017     (H) 06/30/2017    HGBA1C 9.8 (H) 06/30/2017    MG 1.7 05/29/2015    AST 16 06/30/2017    ALT 10 06/30/2017    ALBUMIN 3.4 (L) 06/30/2017    PROT 6.6 06/30/2017    BILITOT 0.2 06/30/2017    WBC 6.54 05/16/2017    HGB 10.2 (L) 05/16/2017    HCT 32.6 (L) 05/16/2017    MCV 88 05/16/2017     05/16/2017    TSH 1.591 06/30/2017    CHOL 146 06/30/2017    HDL 39 (L) 06/30/2017    LDLCALC 77.2 06/30/2017    TRIG 149 06/30/2017       Assessment:     1. Essential hypertension; adequate control  "   2. Diastolic dysfunction : Compensated   3. Dyslipidemia :  on moderate intensity statin therapy At goal   4. Morbid obesity due to excess calories : Has seen dietician.       Plan:     Annika was seen today for essential hypertension.    Diagnoses and all orders for this visit:    Essential hypertension  Continue current regimen    Diastolic dysfunction  Continue current regimen    Dyslipidemia  Continue current regimen    Morbid obesity due to excess calories

## 2017-07-28 NOTE — PROGRESS NOTES
Diabetes Education  Author: Linda Hartman RD, CDE  Date: 7/28/2017    Diabetes Education Visit  Diabetes Education Record Assessment/Progress: Comprehensive/Group    Diabetes Type  Diabetes Type : Type II    Nutrition  Meal Planning:  (Pt presents with diet logs showing well balanced meals with consistent carbs for the most part. Some breakfast meals are higher in carb than recommended and some evening meals lack carbs. Overall, pt is trying to keep meals consistent and limit carbs to rec amts)    Monitoring   Blood Glucose Logs: Yes (Logs present at visit show SMBG ac/hs. BG globally elevated 300-500's)    Exercise   Frequency: Never    Current Diabetes Treatment   Current Treatment: Insulin (Humalog 13-13-7 and Toujeo 45 in the evening, Pt denies missed doses of insulin)    Social History  Preferred Learning Method: Face to Face, Demonstration, Hands On, Reading Materials  Primary Support: Self    Barriers to Change  Barriers to Change: None  Learning Challenges : None    Readiness to Learn   Readiness to Learn : Acceptance    Diabetes Education Assessment/Progress  Acute Complications (preventing, detecting, and treating acute complications): Instructed, Discussion, Individual Session, Written Materials Provided (Reviewed causes, s/s and proper tx of hypo- and hyperglycemia)  Nutrition (Incorporating nutritional management into one's lifestyle): Instructed, Discussion, Individual Session, Written Materials Provided (Reviewed diet logs and recommended decrease in carbs at breakfast at times - d/c fruit juice and to include a carb source at all meals - occasionally is not eating carbs at dinner. Also reviewed label reading and rec carbs for meals and snacks)    Medications (states correct name, dose, onset, peak, duration, side effects & timing of meds): Instructed, Discussion, Individual Session, Written Materials Provided (Shared BG logs with DIXIE Schaefer NP, how determined that pt would likely have  better BG control if she was back on U-500. Pt agreed to this change and the following doses were set: 85 units with breakfsat 65 units with lunch and 20 units with dinner using U-500 Kwikpen)    Monitoring (monitoring blood glucose/other parameters & using results): Instructed, Discussion, Individual Session, Written Materials Provided (Provided BG logs and instructed to continue to SMBG ac/hs and to send logs to NP in 2 weeks for review)    Goal Setting and Problem Solving (verbalizes behavior change strategies & sets realistic goals): Instructed, Discussion, Individual Session    Behavior Change (developing personal strategies to health & behavior change): Instructed, Discussion, Individual Session    Psychosocial Issues (developing personal srategies to address psychosocial concerns): Instructed, Discussion, Individual Session    Goals  Healthy Eating: In Progress (eliminate fruit juice)  Medications: % Met (Call insurance regarding coverage of u-500 pen and contact DIXIE Schaefer NP with findings. )  Met Percentage : 100%    Diabetes Self-Management Support Plan  Medication: Medication Assistance  Review Status: Patient has selected and agrees to support plan.    Diabetes Care Plan/Intervention  Education Plan/Intervention: Endocrine Provider Visit Set Up    Diabetes Meal Plan  Restrictions: Restricted Carbohydrate  Carbohydrate Per Meal: 45-60g, 30-45g  Carbohydrate Per Snack : 7-15g    Education Units of Time   Time Spent: 60 min      Health Maintenance Topics with due status: Not Due       Topic Last Completion Date    Colonoscopy 04/03/2008    Mammogram 10/11/2016    Eye Exam 10/18/2016    Influenza Vaccine 12/14/2016    DEXA SCAN 06/09/2017    Lipid Panel 06/30/2017    Hemoglobin A1c 06/30/2017    Foot Exam 07/11/2017     There are no preventive care reminders to display for this patient.

## 2017-08-01 ENCOUNTER — TELEPHONE (OUTPATIENT)
Dept: ENDOCRINOLOGY | Facility: CLINIC | Age: 67
End: 2017-08-01

## 2017-08-01 NOTE — TELEPHONE ENCOUNTER
----- Message from Jessi Paulino sent at 8/1/2017 10:59 AM CDT -----  Contact: Self / [phone] (m)  7/28 52/161   197  263  273  7/29 351        434   435  263    7/30  207       240  492  288  7/31 277        218 239    231  8/1   338    Breakfast 85 units  u-500  Lunch 65 units dinner 20 units

## 2017-08-02 NOTE — TELEPHONE ENCOUNTER
Please call pt.   Increase U500 to 90 units before breakfast, 70 units before lunch, 25 units before dinner.  DO NOT SKIP doses.  Please bring logs to appt 8/9.

## 2017-08-04 ENCOUNTER — OFFICE VISIT (OUTPATIENT)
Dept: PHYSICAL MEDICINE AND REHAB | Facility: CLINIC | Age: 67
End: 2017-08-04
Payer: MEDICARE

## 2017-08-04 ENCOUNTER — OFFICE VISIT (OUTPATIENT)
Dept: PODIATRY | Facility: CLINIC | Age: 67
End: 2017-08-04
Payer: MEDICARE

## 2017-08-04 VITALS
DIASTOLIC BLOOD PRESSURE: 62 MMHG | HEIGHT: 65 IN | WEIGHT: 293 LBS | SYSTOLIC BLOOD PRESSURE: 116 MMHG | BODY MASS INDEX: 48.82 KG/M2 | HEART RATE: 75 BPM

## 2017-08-04 VITALS
WEIGHT: 293 LBS | DIASTOLIC BLOOD PRESSURE: 62 MMHG | SYSTOLIC BLOOD PRESSURE: 109 MMHG | RESPIRATION RATE: 18 BRPM | BODY MASS INDEX: 48.82 KG/M2 | HEART RATE: 78 BPM | HEIGHT: 65 IN

## 2017-08-04 DIAGNOSIS — G63 POLYNEUROPATHY ASSOCIATED WITH UNDERLYING DISEASE: ICD-10-CM

## 2017-08-04 DIAGNOSIS — M79.2 RADICULAR PAIN IN LEFT ARM: ICD-10-CM

## 2017-08-04 DIAGNOSIS — B35.1 ONYCHOMYCOSIS DUE TO DERMATOPHYTE: ICD-10-CM

## 2017-08-04 DIAGNOSIS — L84 CORN OR CALLUS: ICD-10-CM

## 2017-08-04 DIAGNOSIS — G89.29 CHRONIC LOW BACK PAIN WITH SCIATICA, SCIATICA LATERALITY UNSPECIFIED, UNSPECIFIED BACK PAIN LATERALITY: Primary | ICD-10-CM

## 2017-08-04 DIAGNOSIS — M17.11 PRIMARY OSTEOARTHRITIS OF RIGHT KNEE: ICD-10-CM

## 2017-08-04 DIAGNOSIS — E11.49 TYPE II DIABETES MELLITUS WITH NEUROLOGICAL MANIFESTATIONS: Primary | ICD-10-CM

## 2017-08-04 DIAGNOSIS — M47.22 OSTEOARTHRITIS OF SPINE WITH RADICULOPATHY, CERVICAL REGION: ICD-10-CM

## 2017-08-04 DIAGNOSIS — M48.061 LUMBAR SPINAL STENOSIS: ICD-10-CM

## 2017-08-04 DIAGNOSIS — M47.816 LUMBAR FACET ARTHROPATHY: ICD-10-CM

## 2017-08-04 DIAGNOSIS — M54.40 CHRONIC LOW BACK PAIN WITH SCIATICA, SCIATICA LATERALITY UNSPECIFIED, UNSPECIFIED BACK PAIN LATERALITY: Primary | ICD-10-CM

## 2017-08-04 DIAGNOSIS — H26.493 PCO (POSTERIOR CAPSULAR OPACIFICATION), BILATERAL: ICD-10-CM

## 2017-08-04 DIAGNOSIS — E11.43 DIABETIC AUTONOMIC NEUROPATHY ASSOCIATED WITH TYPE 2 DIABETES MELLITUS: ICD-10-CM

## 2017-08-04 DIAGNOSIS — M25.561 ACUTE PAIN OF RIGHT KNEE: ICD-10-CM

## 2017-08-04 PROCEDURE — 99214 OFFICE O/P EST MOD 30 MIN: CPT | Mod: S$PBB,,, | Performed by: PHYSICAL MEDICINE & REHABILITATION

## 2017-08-04 PROCEDURE — 99999 PR PBB SHADOW E&M-EST. PATIENT-LVL III: CPT | Mod: PBBFAC,,, | Performed by: PHYSICAL MEDICINE & REHABILITATION

## 2017-08-04 PROCEDURE — 11721 DEBRIDE NAIL 6 OR MORE: CPT | Mod: 59,Q9,S$PBB, | Performed by: PODIATRIST

## 2017-08-04 PROCEDURE — 3066F NEPHROPATHY DOC TX: CPT | Mod: ,,, | Performed by: PHYSICAL MEDICINE & REHABILITATION

## 2017-08-04 PROCEDURE — 11056 PARNG/CUTG B9 HYPRKR LES 2-4: CPT | Mod: Q9,S$PBB,, | Performed by: PODIATRIST

## 2017-08-04 PROCEDURE — 3046F HEMOGLOBIN A1C LEVEL >9.0%: CPT | Mod: ,,, | Performed by: PHYSICAL MEDICINE & REHABILITATION

## 2017-08-04 PROCEDURE — 99499 UNLISTED E&M SERVICE: CPT | Mod: S$PBB,,, | Performed by: PODIATRIST

## 2017-08-04 PROCEDURE — 1125F AMNT PAIN NOTED PAIN PRSNT: CPT | Mod: ,,, | Performed by: PHYSICAL MEDICINE & REHABILITATION

## 2017-08-04 PROCEDURE — 99999 PR PBB SHADOW E&M-EST. PATIENT-LVL III: CPT | Mod: PBBFAC,,, | Performed by: PODIATRIST

## 2017-08-04 PROCEDURE — 1159F MED LIST DOCD IN RCRD: CPT | Mod: ,,, | Performed by: PHYSICAL MEDICINE & REHABILITATION

## 2017-08-04 PROCEDURE — 99213 OFFICE O/P EST LOW 20 MIN: CPT | Mod: PBBFAC,25,27 | Performed by: PHYSICAL MEDICINE & REHABILITATION

## 2017-08-04 RX ORDER — HYDROCODONE BITARTRATE AND ACETAMINOPHEN 10; 325 MG/1; MG/1
1 TABLET ORAL EVERY 8 HOURS PRN
Qty: 90 TABLET | Refills: 0 | Status: SHIPPED | OUTPATIENT
Start: 2017-08-04 | End: 2017-09-03

## 2017-08-04 RX ORDER — HYDROCODONE BITARTRATE AND ACETAMINOPHEN 10; 325 MG/1; MG/1
1 TABLET ORAL EVERY 8 HOURS PRN
Qty: 90 TABLET | Refills: 0 | Status: SHIPPED | OUTPATIENT
Start: 2017-10-04 | End: 2017-11-21 | Stop reason: SDUPTHER

## 2017-08-04 NOTE — PROGRESS NOTES
"Subjective:       Patient ID: Annika Mac is a 66 y.o. female.    Chief Complaint: Back Pain; Knee Pain (L); and Arm Pain    Back Pain   Associated symptoms include leg pain. Pertinent negatives include no abdominal pain, chest pain, fever, numbness or weakness. Headaches:     Leg Pain    Pertinent negatives include no numbness.   Knee Pain    Pertinent negatives include no numbness.   Arm Pain    Pertinent negatives include no chest pain or numbness.   Patient is 65 y/o female , who returns to clinic for chronic back pain.   LCV 02/16/17.  Today complains about back pain, leg pain, and Rt knee pain.   Current knee pain is 6-7,wprst pan is 10/10, best is 3-4 with meds.  Current back pain is  3,  worst pain are 10/10, best is 3-4 with meds.  Pain  Is present at all time, accross lower back in midline of tail bone.    Back pain is more dull pain, and leg pain is more shooting pain art the top of thighs,   and bellow the knee.   Pain is present daytime, and radiates to both legs on front of legs.  Cannot walk <  ft using a cane, stands straight < 5 minutes.   She has to lean forward even with cane or during walking.   Back pain increases, and leg become weak.   She has diabetic neuropathy in both feet, top and bottom, tingling pain.  Does not have "charilie horses".   Sitting down helps a little bit, and lying down  ( on side, or stomach).   She failed Neurontin 300 mg  for maybe 6 yrs, and takes 3 pills a day, that does not help much.   And , Gabapentin is eventually changed to  Lyrica  100 mg that she takes daily  ( approved by insurance , and pain in hands is gone),  She has been getting NAYLA since 2011, by Dr. Quinn and Geronimo with NAYLA, with some pain relief.   She had a second B/l L5/S1 TF NAYLA on 08/18/16, and she reported that helped a lot > 80% and lasted for only 2 weeks.   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   She has SC, manual WC, and RW .  Here for follow up, re evaluation and treatment.     Past Medical History:   Diagnosis Date    Allergy     Anemia 2012    Arthritis     CHF (congestive heart failure)     CKD (chronic kidney disease) stage 3, GFR 30-59 ml/min 2012    Clotting disorder     Deep vein thrombophlebitis of left leg 2012    Degenerative disc disease     Diabetic peripheral neuropathy associated with type 2 diabetes mellitus 2012    GERD (gastroesophageal reflux disease)     HTN (hypertension) 2012    Hyperlipidemia 2012    Lead-induced gout of ankle or foot     right going on three weeks    Nonproliferative diabetic retinopathy of right eye 2012    Obstructive sleep apnea 2012    Osteoporosis     Proliferative diabetic retinopathy of left eye 2012    Stroke 2003    Type 2 diabetes mellitus with diabetic polyneuropathy 2012    Ulcer        Past Surgical History:   Procedure Laterality Date    CATARACT EXTRACTION W/  INTRAOCULAR LENS IMPLANT Left 3/2002    left     CATARACT EXTRACTION W/  INTRAOCULAR LENS IMPLANT Right     OD dr. olivares     SECTION      CYST REMOVAL  2011    left side of face    EYE SURGERY Bilateral 2012    eye implants    GANGLION CYST EXCISION  2007    Right wrist    Pan Retinal Photocoagulation Bilateral     Dr. Monterroso (Proliferative Diabetic Retinopathy)    TOTAL ABDOMINAL HYSTERECTOMY      TOTAL KNEE ARTHROPLASTY  2006    left    TRIGGER FINGER RELEASE  2009       Family History   Problem Relation Age of Onset    Diabetes Mother     Hypertension Mother     Heart disease Father     Diabetes Sister     Thyroid disease Brother     Diabetes Brother     Hypertension Brother     Amblyopia Neg Hx      Blindness Neg Hx     Glaucoma Neg Hx     Macular degeneration Neg Hx     Retinal detachment Neg Hx     Strabismus Neg Hx        Social History     Social History    Marital status:      Spouse name: Heber    Number of children: 3    Years of education: N/A     Social History Main Topics    Smoking status: Never Smoker    Smokeless tobacco: Never Used    Alcohol use No    Drug use: No    Sexual activity: Yes     Partners: Male     Other Topics Concern    None     Social History Narrative    , lives with family; 3 children, 2 grandchildren       Current Outpatient Prescriptions   Medication Sig Dispense Refill    albuterol 90 mcg/actuation inhaler Inhale 2 puffs into the lungs every 6 (six) hours as needed for Wheezing. 1 Inhaler 0    allopurinol (ZYLOPRIM) 100 MG tablet Take 1 tablet (100 mg total) by mouth once daily. 30 tablet 3    aspirin 81 MG Chew Take 1 tablet (81 mg total) by mouth once daily.  0    azelastine (ASTELIN) 137 mcg (0.1 %) nasal spray 1 spray (137 mcg total) by Nasal route 2 (two) times daily. 30 mL 11    blood-glucose meter kit Use as instructed, true test/result meter 1 each 0    colchicine 0.6 mg tablet Take 1 tablet (0.6 mg total) by mouth once daily. 30 tablet 3    duloxetine (CYMBALTA) 30 MG capsule Take 1 capsule (30 mg total) by mouth once daily. 90 capsule 3    ferrous sulfate 325 mg (65 mg iron) Tab tablet Take 1 tablet (325 mg total) by mouth 2 (two) times daily. 180 tablet 2    fexofenadine (ALLEGRA) 180 MG tablet TAKE 1 TABLET BY MOUTH ONCE DAILY 30 tablet 0    fluticasone (FLONASE) 50 mcg/actuation nasal spray 2 sprays by Each Nare route once daily. 48 g 6    furosemide (LASIX) 20 MG tablet Take 1 tablet by mouth once a day 90 tablet 3    hydrocodone-acetaminophen 10-325mg (NORCO)  mg Tab Take 1 tablet by mouth every 8 (eight) hours as needed for Pain. 90 tablet 0    hydrocodone-acetaminophen 10-325mg (NORCO)  mg Tab Take 1 tablet  "by mouth every 8 (eight) hours as needed for Pain. 90 tablet 0    [START ON 10/4/2017] hydrocodone-acetaminophen 10-325mg (NORCO)  mg Tab Take 1 tablet by mouth every 8 (eight) hours as needed for Pain. 90 tablet 0    insulin regular hum U-500 conc (HUMULIN R U-500, CONC, KWIKPEN) 500 unit/mL (3 mL) InPn Inject 20-85 Units into the skin 3 (three) times daily before meals. 11 Syringe 1    irbesartan (AVAPRO) 300 MG tablet Take 1 tablet (300 mg total) by mouth every evening. 90 tablet 2    labetalol (NORMODYNE) 300 MG tablet Take 1 tablet by mouth two  times daily 120 tablet 1    lancets 31 gauge Misc 1 lancet by Misc.(Non-Drug; Combo Route) route 4 (four) times daily. 150 each 6    levocetirizine (XYZAL) 5 MG tablet Take 1 tablet (5 mg total) by mouth every evening. 7 tablet 0    montelukast (SINGULAIR) 10 mg tablet TAKE 1 TABLET(10 MG) BY MOUTH EVERY EVENING 90 tablet 0    nystatin (MYCOSTATIN) powder Apply topically 3 (three) times daily. 60 g 2    pantoprazole (PROTONIX) 40 MG tablet Take 1 tablet (40 mg total) by mouth once daily. 90 tablet 3    pen needle, diabetic 32 gauge x 5/32" Ndle Use with multiple daily insulin injections 100 each 12    pregabalin (LYRICA) 100 MG capsule Take 1 capsule (100 mg total) by mouth 2 (two) times daily. 180 capsule 1    simvastatin (ZOCOR) 40 MG tablet Take 1 tablet (40 mg total) by mouth every evening. 90 tablet 4    SITagliptan (JANUVIA) 50 MG Tab Take 1 tablet (50 mg total) by mouth once daily. 90 tablet 4    spironolactone (ALDACTONE) 25 MG tablet Take 1 tablet by mouth once a day 90 tablet 3    TRUETEST TEST STRIPS Strp 1 strip by Misc.(Non-Drug; Combo Route) route 4 (four) times daily. 400 strip 4     No current facility-administered medications for this visit.        Review of patient's allergies indicates:   Allergen Reactions    Iodinated contrast media - oral and iv dye Rash         Review of Systems   Constitutional: Negative.  Negative for " appetite change, chills, fatigue, fever and unexpected weight change.   HENT: Negative.  Negative for drooling, trouble swallowing and voice change.    Eyes: Negative.  Negative for pain and visual disturbance.   Respiratory: Negative.  Negative for shortness of breath and wheezing.    Cardiovascular: Negative.  Negative for chest pain and palpitations.   Gastrointestinal: Negative.  Negative for abdominal distention, abdominal pain, constipation and diarrhea.   Genitourinary: Negative.  Negative for difficulty urinating.   Musculoskeletal: Positive for back pain. Negative for arthralgias, gait problem, joint swelling, myalgias and neck stiffness.               Skin: Negative.  Negative for color change and rash.   Neurological: Negative.  Negative for dizziness, facial asymmetry, speech difficulty, weakness, light-headedness and numbness. Headaches:     Hematological: Negative for adenopathy.   Psychiatric/Behavioral: Negative.  Negative for behavioral problems, confusion and sleep disturbance. The patient is not nervous/anxious.            Objective:      Physical Exam      GENERAL: The patient is alert, oriented, pleasant.   HEENT; PERRLA  NECK: supple   MUSCULOSKELETAL:   Gait - slow james , on wide basis, using RW.   Cervical spine :  Minimally decreased AROM in cervical spine, flexion to 60, extension to  0,,   side bending and rotation  to 30-35 degrees without  Pain ,   No paravertebral cervical musculature tenderness    No  tenderness in upper trapezius muscles    Lumbar spine, full range of motion in all planes, flexion to 90 degrees , ext. 0.  Side bending and rotation to 35-40 degrees.   Straight leg raising Negative bilaterally.   Full range of motion in all joints x4 extremities, except in RT knee, that is very painful at pattellar lower pole/border.  Appears high riding patella, hat is maybe  lateray displaced,  Muscle strength 5/5 throughout x4 extremities.   No  joint laxity throughout x4  extremities.   NEUROLOGIC: Cranial nerves II through XII intact.   Deep tendon reflexes is normal, +2 in the upper and lower extremities bilaterally.   Muscle tone is normal.   Sensory is intact to light touch and pinprick throughout x4 extremities.     MRI of Lumbar spine showed:  T12-L1:  There is no focal disc herniation.  No significant spinal canal narrowing.    No significant neural foraminal narrowing.  L1-2:  There is no focal disc herniation.  No significant spinal canal narrowing.    No significant neural foraminal narrowing.  L2-3:  There is no focal disc herniation.  No significant spinal canal narrowing.    No significant neural foraminal narrowing.  L3-4:  There is a minimal circumferential disk bulge and mild bilateral facet arthropathy which results in no significant spinal canal or neural foraminal narrowing.  L4-5:    There is circumferential disk bulge (eccentric to the left), moderate bilateral hypertrophic facet arthropathy, and ligamentum flavum thickening which results in mild spinal canal narrowing and no significant neural foraminal narrowing.      L5-S1:  There is circumferential disk bulge and severe hypertrophic facet arthropathy   with ligamentum flavum hypertrophy which results in mild spinal canal narrowing, moderate left neural foraminal narrowing, and mild right neural foraminal narrowing.i  Impression:   multilevel degenerative changes of the lumbar spine consisting of circumferential disk bulges, hypertrophic facet arthropathy, and ligamentum flavum hypertrophy   which result in mild spinal canal narrowing at L4-5  and L5-S1 as well as moderate left and mild right neural foraminal narrowing at L5-S1.      Assessment:       1. Chronic low back pain with sciatica, sciatica laterality unspecified, unspecified back pain laterality    2. Polyneuropathy associated with underlying disease    3. Lumbar spinal stenosis    4. Lumbar facet arthropathy    5. Osteoarthritis of spine with  radiculopathy, cervical region    6. Radicular pain in left arm    7. Acute pain of right knee    8. PCO (posterior capsular opacification), bilateral    9. Diabetic autonomic neuropathy associated with type 2 diabetes mellitus    10. Primary osteoarthritis of right knee      Plan:       Chronic low back pain with sciatica, sciatica laterality unspecified, unspecified back pain laterality  -     hydrocodone-acetaminophen 10-325mg (NORCO)  mg Tab; Take 1 tablet by mouth every 8 (eight) hours as needed for Pain.  Dispense: 90 tablet; Refill: 0  -     hydrocodone-acetaminophen 10-325mg (NORCO)  mg Tab; Take 1 tablet by mouth every 8 (eight) hours as needed for Pain.  Dispense: 90 tablet; Refill: 0  -     hydrocodone-acetaminophen 10-325mg (NORCO)  mg Tab; Take 1 tablet by mouth every 8 (eight) hours as needed for Pain.  Dispense: 90 tablet; Refill: 0    Polyneuropathy associated with underlying disease  -     hydrocodone-acetaminophen 10-325mg (NORCO)  mg Tab; Take 1 tablet by mouth every 8 (eight) hours as needed for Pain.  Dispense: 90 tablet; Refill: 0  -     hydrocodone-acetaminophen 10-325mg (NORCO)  mg Tab; Take 1 tablet by mouth every 8 (eight) hours as needed for Pain.  Dispense: 90 tablet; Refill: 0  -     hydrocodone-acetaminophen 10-325mg (NORCO)  mg Tab; Take 1 tablet by mouth every 8 (eight) hours as needed for Pain.  Dispense: 90 tablet; Refill: 0    Lumbar spinal stenosis  -     hydrocodone-acetaminophen 10-325mg (NORCO)  mg Tab; Take 1 tablet by mouth every 8 (eight) hours as needed for Pain.  Dispense: 90 tablet; Refill: 0  -     hydrocodone-acetaminophen 10-325mg (NORCO)  mg Tab; Take 1 tablet by mouth every 8 (eight) hours as needed for Pain.  Dispense: 90 tablet; Refill: 0  -     hydrocodone-acetaminophen 10-325mg (NORCO)  mg Tab; Take 1 tablet by mouth every 8 (eight) hours as needed for Pain.  Dispense: 90 tablet; Refill: 0    Lumbar facet  arthropathy  -     hydrocodone-acetaminophen 10-325mg (NORCO)  mg Tab; Take 1 tablet by mouth every 8 (eight) hours as needed for Pain.  Dispense: 90 tablet; Refill: 0  -     hydrocodone-acetaminophen 10-325mg (NORCO)  mg Tab; Take 1 tablet by mouth every 8 (eight) hours as needed for Pain.  Dispense: 90 tablet; Refill: 0  -     hydrocodone-acetaminophen 10-325mg (NORCO)  mg Tab; Take 1 tablet by mouth every 8 (eight) hours as needed for Pain.  Dispense: 90 tablet; Refill: 0    Osteoarthritis of spine with radiculopathy, cervical region  -     hydrocodone-acetaminophen 10-325mg (NORCO)  mg Tab; Take 1 tablet by mouth every 8 (eight) hours as needed for Pain.  Dispense: 90 tablet; Refill: 0  -     hydrocodone-acetaminophen 10-325mg (NORCO)  mg Tab; Take 1 tablet by mouth every 8 (eight) hours as needed for Pain.  Dispense: 90 tablet; Refill: 0  -     hydrocodone-acetaminophen 10-325mg (NORCO)  mg Tab; Take 1 tablet by mouth every 8 (eight) hours as needed for Pain.  Dispense: 90 tablet; Refill: 0    Radicular pain in left arm  -     hydrocodone-acetaminophen 10-325mg (NORCO)  mg Tab; Take 1 tablet by mouth every 8 (eight) hours as needed for Pain.  Dispense: 90 tablet; Refill: 0  -     hydrocodone-acetaminophen 10-325mg (NORCO)  mg Tab; Take 1 tablet by mouth every 8 (eight) hours as needed for Pain.  Dispense: 90 tablet; Refill: 0  -     hydrocodone-acetaminophen 10-325mg (NORCO)  mg Tab; Take 1 tablet by mouth every 8 (eight) hours as needed for Pain.  Dispense: 90 tablet; Refill: 0    Acute pain of right knee  -     hydrocodone-acetaminophen 10-325mg (NORCO)  mg Tab; Take 1 tablet by mouth every 8 (eight) hours as needed for Pain.  Dispense: 90 tablet; Refill: 0  -     hydrocodone-acetaminophen 10-325mg (NORCO)  mg Tab; Take 1 tablet by mouth every 8 (eight) hours as needed for Pain.  Dispense: 90 tablet; Refill: 0  -     hydrocodone-acetaminophen  10-325mg (NORCO)  mg Tab; Take 1 tablet by mouth every 8 (eight) hours as needed for Pain.  Dispense: 90 tablet; Refill: 0    PCO (posterior capsular opacification), bilateral  -     hydrocodone-acetaminophen 10-325mg (NORCO)  mg Tab; Take 1 tablet by mouth every 8 (eight) hours as needed for Pain.  Dispense: 90 tablet; Refill: 0  -     hydrocodone-acetaminophen 10-325mg (NORCO)  mg Tab; Take 1 tablet by mouth every 8 (eight) hours as needed for Pain.  Dispense: 90 tablet; Refill: 0  -     hydrocodone-acetaminophen 10-325mg (NORCO)  mg Tab; Take 1 tablet by mouth every 8 (eight) hours as needed for Pain.  Dispense: 90 tablet; Refill: 0    Diabetic autonomic neuropathy associated with type 2 diabetes mellitus  -     hydrocodone-acetaminophen 10-325mg (NORCO)  mg Tab; Take 1 tablet by mouth every 8 (eight) hours as needed for Pain.  Dispense: 90 tablet; Refill: 0  -     hydrocodone-acetaminophen 10-325mg (NORCO)  mg Tab; Take 1 tablet by mouth every 8 (eight) hours as needed for Pain.  Dispense: 90 tablet; Refill: 0  -     hydrocodone-acetaminophen 10-325mg (NORCO)  mg Tab; Take 1 tablet by mouth every 8 (eight) hours as needed for Pain.  Dispense: 90 tablet; Refill: 0    Primary osteoarthritis of right knee  -     hydrocodone-acetaminophen 10-325mg (NORCO)  mg Tab; Take 1 tablet by mouth every 8 (eight) hours as needed for Pain.  Dispense: 90 tablet; Refill: 0  -     hydrocodone-acetaminophen 10-325mg (NORCO)  mg Tab; Take 1 tablet by mouth every 8 (eight) hours as needed for Pain.  Dispense: 90 tablet; Refill: 0  -     hydrocodone-acetaminophen 10-325mg (NORCO)  mg Tab; Take 1 tablet by mouth every 8 (eight) hours as needed for Pain.  Dispense: 90 tablet; Refill: 0      Patient with chronic low back pain, secondary to lumbar spondylosis, possible radiculopathy, cx with morbid obesity.   Also with gait instability sec. To sever DJD of Rt knee.    1. Will  increase  Hydrocodone to 10/325 mg , and Gabapentin is changed to  Lyrica  100 mg BID ( approved by insurance, and pain in hands is gone),   Add Cymbalta.  2. Will resume  PT, external referral given closer to home.  3. Rt knee pain , xray of knee showed severe DJD.  Knee brace for stability,and proprioception.  Seen by Betsey Paige PA ,and dr. [facility], who recommended no surgery in this high risk pt, weight loss in recommended before re evaluation.    RTC in 3 months.     Total time spent face to face with patient was 25 minutes.   More than 50% of that time was spent in counseling on diagnosis , prognosis and treatment options.   I also caunsel patient  on common and most usual side effect of prescribed medications.   Risk and benefits of opiates, possible risk of developing opiate dependence and tolerance, need of strict compliance with prescribed medications.  I reviewed Primary care , and other specialty's notes to better coordinate patient's  care.   All questions were answered, and patient voiced understanding.                                                                                                                                                                          .

## 2017-08-05 NOTE — PROGRESS NOTES
Subjective:      Patient ID: Annika Mac is a 66 y.o. female.    Chief Complaint: PCP (Cody Villatoro MD 7/11/17); Diabetic Foot Exam; Nail Care; and Foot Pain (RT foot arech pain )    Annika is a 66 y.o. female who presents to the clinic for evaluation and treatment of high risk feet. Annika has a past medical history of Allergy; Anemia (7/27/2012); Arthritis; CHF (congestive heart failure); CKD (chronic kidney disease) stage 3, GFR 30-59 ml/min (7/27/2012); Clotting disorder; Deep vein thrombophlebitis of left leg (7/27/2012); Degenerative disc disease; Diabetic peripheral neuropathy associated with type 2 diabetes mellitus (7/27/2012); GERD (gastroesophageal reflux disease); HTN (hypertension) (7/27/2012); Hyperlipidemia (7/27/2012); Lead-induced gout of ankle or foot; Nonproliferative diabetic retinopathy of right eye (7/27/2012); Obstructive sleep apnea (7/27/2012); Osteoporosis; Proliferative diabetic retinopathy of left eye (7/27/2012); Stroke (8/1/2003); Type 2 diabetes mellitus with diabetic polyneuropathy (7/27/2012); and Ulcer. The patient's chief complaint is long, thick toenailsThis patient has documented high risk feet requiring routine maintenance secondary to diabetes mellitis and those secondary complications of diabetes, as mentioned..       PCP: Coyd Villatoro MD    Date Last Seen by PCP:   Chief Complaint   Patient presents with    PCP     Cody Villatoro MD 7/11/17    Diabetic Foot Exam    Nail Care    Foot Pain     RT foot arech pain        Chief Complaint   Patient presents with    PCP     Cody Villatoro MD 7/11/17    Diabetic Foot Exam    Nail Care    Foot Pain     RT foot arech pain        Current shoe gear: black leather DM shoes w/ custom inserts     Hemoglobin A1C   Date Value Ref Range Status   06/30/2017 9.8 (H) 4.0 - 5.6 % Final     Comment:     According to ADA guidelines, hemoglobin A1c <7.0% represents  optimal control in non-pregnant diabetic patients. Different  metrics may  apply to specific patient populations.   Standards of Medical Care in Diabetes-2016.  For the purpose of screening for the presence of diabetes:  <5.7%     Consistent with the absence of diabetes  5.7-6.4%  Consistent with increasing risk for diabetes   (prediabetes)  >or=6.5%  Consistent with diabetes  Currently, no consensus exists for use of hemoglobin A1c  for diagnosis of diabetes for children.  This Hemoglobin A1c assay has significant interference with fetal   hemoglobin   (HbF). The results are invalid for patients with abnormal amounts of   HbF,   including those with known Hereditary Persistence   of Fetal Hemoglobin. Heterozygous hemoglobin variants (HbAS, HbAC,   HbAD, HbAE, HbA2) do not significantly interfere with this assay;   however, presence of multiple variants in a sample may impact the %   interference.     03/21/2017 9.4 (H) 4.5 - 6.2 % Final     Comment:     According to ADA guidelines, hemoglobin A1C <7.0% represents  optimal control in non-pregnant diabetic patients.  Different  metrics may apply to specific populations.   Standards of Medical Care in Diabetes - 2016.  For the purpose of screening for the presence of diabetes:  <5.7%     Consistent with the absence of diabetes  5.7-6.4%  Consistent with increasing risk for diabetes   (prediabetes)  >or=6.5%  Consistent with diabetes  Currently no consensus exists for use of hemoglobin A1C  for diagnosis of diabetes for children.     11/25/2016 9.1 (H) 4.5 - 6.2 % Final     Comment:     According to ADA guidelines, hemoglobin A1C <7.0% represents  optimal control in non-pregnant diabetic patients.  Different  metrics may apply to specific populations.   Standards of Medical Care in Diabetes - 2016.  For the purpose of screening for the presence of diabetes:  <5.7%     Consistent with the absence of diabetes  5.7-6.4%  Consistent with increasing risk for diabetes   (prediabetes)  >or=6.5%  Consistent with diabetes  Currently no consensus  exists for use of hemoglobin A1C  for diagnosis of diabetes for children.         Review of Systems   Constitution: Negative for chills, decreased appetite and fever.   Cardiovascular: Negative for leg swelling.   Skin: Positive for dry skin and nail changes.   Musculoskeletal: Positive for arthritis and falls. Negative for back pain, gout, joint pain, joint swelling and myalgias.   Gastrointestinal: Negative for nausea and vomiting.   Neurological: Positive for numbness. Negative for loss of balance and paresthesias.           Objective:      Physical Exam   Constitutional: She is oriented to person, place, and time. She appears well-developed and well-nourished. No distress.   Cardiovascular:   Dorsalis pedis and posterior tibial pulses are diminished Bilaterally. Toes are cool to touch. Feet are warm proximally.There is decreased digital hair. Skin is atrophic, slightly hyperpigmented, and mildly edematous       Musculoskeletal: Normal range of motion. She exhibits no edema, tenderness or deformity.   Equinus noted b/l ankles, gastroc. Adequate joint range of motion without pain, limitation, nor crepitation Bilateral pedal joints. Muscle strength is 5/5 in all groups bilaterally.        Neurological: She is alert and oriented to person, place, and time.   [place] 5.07 monofilamant testing is diminished [place] feet. Sharp/dull sensation diminished Bilaterally. Light touch absent Bilaterally.       Skin: Skin is warm, dry and intact. No abrasion, no bruising, no burn, no laceration, no lesion, no petechiae and no rash noted. She is not diaphoretic. No pallor.   Nails 1-5 b/l  are elongated by  2-7mm's, thickened by 3-4 mm's, dystrophic, and are darkened in  coloration . Xerosis Bilaterally. No open lesions noted.      Hyperkeratotic tissue noted to distal 2nd toe b/l      Psychiatric: She has a normal mood and affect. Thought content normal.   Nursing note and vitals reviewed.            Assessment:        Encounter Diagnoses   Name Primary?    Type II diabetes mellitus with neurological manifestations Yes    Onychomycosis due to dermatophyte          Plan:       Annika was seen today for pcp, diabetic foot exam, nail care and foot pain.    Diagnoses and all orders for this visit:    Type II diabetes mellitus with neurological manifestations  -     DIABETIC SHOES FOR HOME USE    Onychomycosis due to dermatophyte      I counseled the patient on her conditions, their implications and medical management.        - Shoe inspection. Diabetic Foot Education. Patient reminded of the importance of good nutrition and blood sugar control to help prevent podiatric complications of diabetes. Patient instructed on proper foot hygeine. We discussed wearing proper shoe gear, daily foot inspections, never walking without protective shoe gear, never putting sharp instruments to feet, routine podiatric nail visits every 2-3 months.      - With patient's permission, nails were aggressively reduced and debrided x 10 to their soft tissue attachment mechanically and with electric , removing all offending nail and debris. Patient relates relief following the procedure. She will continue to monitor the areas daily, inspect her feet, wear protective shoe gear when ambulatory, moisturizer to maintain skin integrity and follow in this office in approximately 2-3 months, sooner p.r.n.    - After cleansing the  area w/ alcohol prep pad the above mentioned hyperkeratosis was trimmed utilizing No 15 scapel, to a smooth base with out incident. Patient tolerated this  well and reported comfort to the area of distal 2nd toe b/l

## 2017-08-09 ENCOUNTER — OFFICE VISIT (OUTPATIENT)
Dept: ENDOCRINOLOGY | Facility: CLINIC | Age: 67
End: 2017-08-09
Payer: MEDICARE

## 2017-08-09 VITALS
BODY MASS INDEX: 48.82 KG/M2 | SYSTOLIC BLOOD PRESSURE: 130 MMHG | DIASTOLIC BLOOD PRESSURE: 83 MMHG | HEIGHT: 65 IN | HEART RATE: 80 BPM | WEIGHT: 293 LBS

## 2017-08-09 DIAGNOSIS — E11.42 TYPE 2 DIABETES MELLITUS WITH DIABETIC POLYNEUROPATHY, WITH LONG-TERM CURRENT USE OF INSULIN: Primary | ICD-10-CM

## 2017-08-09 DIAGNOSIS — G63 POLYNEUROPATHY ASSOCIATED WITH UNDERLYING DISEASE: ICD-10-CM

## 2017-08-09 DIAGNOSIS — N18.30 CKD (CHRONIC KIDNEY DISEASE) STAGE 3, GFR 30-59 ML/MIN: Chronic | ICD-10-CM

## 2017-08-09 DIAGNOSIS — E78.5 DYSLIPIDEMIA: Chronic | ICD-10-CM

## 2017-08-09 DIAGNOSIS — Z79.4 TYPE 2 DIABETES MELLITUS WITH DIABETIC POLYNEUROPATHY, WITH LONG-TERM CURRENT USE OF INSULIN: Primary | ICD-10-CM

## 2017-08-09 DIAGNOSIS — E55.9 VITAMIN D INSUFFICIENCY: ICD-10-CM

## 2017-08-09 DIAGNOSIS — E66.01 MORBID OBESITY DUE TO EXCESS CALORIES: Chronic | ICD-10-CM

## 2017-08-09 DIAGNOSIS — I10 ESSENTIAL HYPERTENSION: ICD-10-CM

## 2017-08-09 DIAGNOSIS — E11.3599 PROLIFERATIVE DIABETIC RETINOPATHY ASSOCIATED WITH TYPE 2 DIABETES MELLITUS, MACULAR EDEMA PRESENCE UNSPECIFIED, UNSPECIFIED LATERALITY: ICD-10-CM

## 2017-08-09 PROCEDURE — 3046F HEMOGLOBIN A1C LEVEL >9.0%: CPT | Mod: ,,, | Performed by: NURSE PRACTITIONER

## 2017-08-09 PROCEDURE — 99215 OFFICE O/P EST HI 40 MIN: CPT | Mod: PBBFAC | Performed by: NURSE PRACTITIONER

## 2017-08-09 PROCEDURE — 99214 OFFICE O/P EST MOD 30 MIN: CPT | Mod: S$PBB,,, | Performed by: NURSE PRACTITIONER

## 2017-08-09 PROCEDURE — 1159F MED LIST DOCD IN RCRD: CPT | Mod: ,,, | Performed by: NURSE PRACTITIONER

## 2017-08-09 PROCEDURE — 3008F BODY MASS INDEX DOCD: CPT | Mod: ,,, | Performed by: NURSE PRACTITIONER

## 2017-08-09 PROCEDURE — 3066F NEPHROPATHY DOC TX: CPT | Mod: ,,, | Performed by: NURSE PRACTITIONER

## 2017-08-09 PROCEDURE — 1126F AMNT PAIN NOTED NONE PRSNT: CPT | Mod: ,,, | Performed by: NURSE PRACTITIONER

## 2017-08-09 PROCEDURE — 3079F DIAST BP 80-89 MM HG: CPT | Mod: ,,, | Performed by: NURSE PRACTITIONER

## 2017-08-09 PROCEDURE — 99999 PR PBB SHADOW E&M-EST. PATIENT-LVL V: CPT | Mod: PBBFAC,,, | Performed by: NURSE PRACTITIONER

## 2017-08-09 PROCEDURE — 3075F SYST BP GE 130 - 139MM HG: CPT | Mod: ,,, | Performed by: NURSE PRACTITIONER

## 2017-08-09 NOTE — PROGRESS NOTES
"CC: This 66 y.o. female presents for management of Diabetes Mellitus     HPI: Diagnosed with T2DM in July 1987 when c/o polydipsia and polyuria. Started oral agents then NPH and Regular insulin. Converted to MDI with Lantus and Novolog in 2/2006 after knee surgery. D/c TZD r/t weight gain 7/08 and added Symlin. Stopped Symlin 2/09. Januvia added 2/10. D/C Januvia 12/10 r/t cost; resumed though pt assistance in 2016. Converted to U500 in 7/10.   Received DM Education 1/22/08.   11/2013 she experienced severe hypoglycemia at home,  called 911, "nonresponsive" so brought to local ED for hypoglycemia BG 30s in 11/2013.     Medical conditions also include obesity, RUFINA, diastolic dysfunction, h/o stroke, PAD, and CKD 3.  Follows with cardiology, vascular medicine and nephrology.   Also has chronic back pain, which limits activity and contributes to wt gain. Has followed with physical medicine.   Has chronic cough 2/2 GERD.  DM complicated with PDR with ME, PN.    She is again on U500 as BGs. BGs improved, still with variability, but no longer markedly elevated.  Previously changed to Toujeo only by PCP-- markedly elevated BG.  MDI trial unsuccessful as well with marked elevation. Insulin requirement is better served with concentrated insulin over MDI- volume.  Feels better, no longer feels tired.    Recently saw DM ed.   Reported to have good knowledge of CHO portions.     CURRENT DM MEDS: Humulin U500 85-65-20, Januvia 50mg QD.  Denies missed doses.  Has Medicare Extra Help    Logs:  BG 4/d day  Fasting 198-316  TUCKER 178-270  -239, 378   (small meal), 194-289  No  Hypoglycemia. Can recognize and treat symtpoms.      Drinks water, unsweet tea.   No formal exercise.     Standards of Care:  Eye exam: 10/16  Podiatry: 8/2017    ROS:   Gen: Appetite good, denies fatigue and weakness.  Skin: No rashes, no hair changes.  Eyes: Denies visual disturbances  Resp: no SOB or MCFADDEN, no cough today  Cardiac: No " "palpitations, chest pain, no edema   GI: No nausea or vomiting, diarrhea, constipation, or abdominal pain.  MS/Neuro: Denies numbness/ tingling in BLE; Gait steady, speech clear  Chronic back pain.   Psych: Denies drug/ETOH abuse, no hx of depression.  Other systems: negative.    PE: /83 (BP Location: Right arm, Patient Position: Sitting)   Pulse 80   Ht 5' 5" (1.651 m)   Wt (!) 140.8 kg (310 lb 4.8 oz)   BMI 51.64 kg/m²     GENERAL: Well developed, well nourished.  PSYCH: AAOx3, appropriate mood and affect, pleasant expression, conversant, appears relaxed, well groomed.   EYES: Conjunctiva, corneas clear  NECK: Supple, trachea midline.  CHEST: Resp even and unlabored  CARDIAC: RRR, +2/6 murmur lower left sternal border  ABDOMEN: Soft, non-tender, non-distended; +BSs x4  VASCULAR: DP pulses +2/4 bilaterally, mild edema.  NEURO: Gait steady  SKINSkin warm and dry, + acanthosis nigracans.  FEET:  Monofilament sensation intact bilaterally (decreased on left hallux). No open wounds or lesions. Nails are thickened, painted today.  + calluses medial great toes. Decreased hair growth. Sensation to 128 Hz tuning fork bilaterally.     Hemoglobin A1C   Date Value Ref Range Status   06/30/2017 9.8 (H) 4.0 - 5.6 % Final     Comment:     According to ADA guidelines, hemoglobin A1c <7.0% represents  optimal control in non-pregnant diabetic patients. Different  metrics may apply to specific patient populations.   Standards of Medical Care in Diabetes-2016.  For the purpose of screening for the presence of diabetes:  <5.7%     Consistent with the absence of diabetes  5.7-6.4%  Consistent with increasing risk for diabetes   (prediabetes)  >or=6.5%  Consistent with diabetes  Currently, no consensus exists for use of hemoglobin A1c  for diagnosis of diabetes for children.  This Hemoglobin A1c assay has significant interference with fetal   hemoglobin   (HbF). The results are invalid for patients with abnormal amounts of "   HbF,   including those with known Hereditary Persistence   of Fetal Hemoglobin. Heterozygous hemoglobin variants (HbAS, HbAC,   HbAD, HbAE, HbA2) do not significantly interfere with this assay;   however, presence of multiple variants in a sample may impact the %   interference.     03/21/2017 9.4 (H) 4.5 - 6.2 % Final     Comment:     According to ADA guidelines, hemoglobin A1C <7.0% represents  optimal control in non-pregnant diabetic patients.  Different  metrics may apply to specific populations.   Standards of Medical Care in Diabetes - 2016.  For the purpose of screening for the presence of diabetes:  <5.7%     Consistent with the absence of diabetes  5.7-6.4%  Consistent with increasing risk for diabetes   (prediabetes)  >or=6.5%  Consistent with diabetes  Currently no consensus exists for use of hemoglobin A1C  for diagnosis of diabetes for children.     11/25/2016 9.1 (H) 4.5 - 6.2 % Final     Comment:     According to ADA guidelines, hemoglobin A1C <7.0% represents  optimal control in non-pregnant diabetic patients.  Different  metrics may apply to specific populations.   Standards of Medical Care in Diabetes - 2016.  For the purpose of screening for the presence of diabetes:  <5.7%     Consistent with the absence of diabetes  5.7-6.4%  Consistent with increasing risk for diabetes   (prediabetes)  >or=6.5%  Consistent with diabetes  Currently no consensus exists for use of hemoglobin A1C  for diagnosis of diabetes for children.       ASSESSMENT and PLAN:    1. T2DM: uncontrolled, with neuropathy, retinopathy, nephropathy.   Good CHO portion knowledge, so may stagger dosing.   Humulin U500 pen:  Increase breakfast dose to 90U  Continue lunch dose 65U  For bigger lunch increase to 70U  Increase dinner to 25 units.  For smaller dinner, decrease  dose by 10 units.    Continue Januvia 50mg QAM.     BG Ac/HS. Send logs in 10 days. Call sooner for hypoglycemia.   Hypoglycemia management reviewed. Carry glucose.      Discussed A1c goals.     - takes ASA, ACEi, statin     2. PDR with macular edema - optimize BG. Continue follow with Ophth.      3. CKD stage III - GFR 35.7, on ACEi therapy. No metformin or SLGT2i. Saw nephrology 5/2017.     4. Peripheral neuropathy - on Lyrica and Cymbalta with relief. Off gabapentin.      5. HLP - LDL trending down. Continue statin.      6. Morbid obesity  Body mass index is 51.64 kg/m².  Increases insulin resistance. Chronic pain limits physical activity.  Wt management consideration moving forward.    7. HTN - Controlled. On ARB.       Follow UP  RTC in 3 mths

## 2017-08-20 DIAGNOSIS — T78.40XD ALLERGY, SUBSEQUENT ENCOUNTER: ICD-10-CM

## 2017-08-21 RX ORDER — MINERAL OIL
ENEMA (ML) RECTAL
Qty: 30 TABLET | Refills: 0 | Status: SHIPPED | OUTPATIENT
Start: 2017-08-21 | End: 2022-01-14

## 2017-08-29 NOTE — TELEPHONE ENCOUNTER
Reviewed BG logs:  Fastin-156, 200-282, 303, 342  TUCKER: 141- 210s, few higher  DIN: 120-388  -245    Please call pt.   Change U500 dosing.  Lunch: 70 units for regular meal, 65 units for a smaller meal  Dinner: 30 units for a regular meal, 15 units for a smaller meal

## 2017-09-11 ENCOUNTER — TELEPHONE (OUTPATIENT)
Dept: HEMATOLOGY/ONCOLOGY | Facility: CLINIC | Age: 67
End: 2017-09-11

## 2017-09-11 NOTE — TELEPHONE ENCOUNTER
----- Message from Lorraine Barron sent at 9/11/2017 11:44 AM CDT -----  Contact: pt  Pt contact [phone] or [phone]    Pt calling to [facility] appt from a letter she received for October

## 2017-09-11 NOTE — TELEPHONE ENCOUNTER
Returned call, twice to patient and spoke with her both times for a brief moment as something was wrong with her phone. I remailed her apt slips out.

## 2017-09-13 ENCOUNTER — TELEPHONE (OUTPATIENT)
Dept: HEMATOLOGY/ONCOLOGY | Facility: CLINIC | Age: 67
End: 2017-09-13

## 2017-09-13 NOTE — TELEPHONE ENCOUNTER
----- Message from Brittany Hilton RN sent at 9/13/2017  9:38 AM CDT -----  Contact: pt      ----- Message -----  From: Raquel Pena  Sent: 9/13/2017   9:31 AM  To: Jennifer Love Staff    Pt missed a call and would like the nurse to return their call.        Pt can be reached at [phone]

## 2017-09-13 NOTE — TELEPHONE ENCOUNTER
----- Message from Brittany Hilton RN sent at 9/12/2017  4:24 PM CDT -----  Contact: pt       ----- Message -----  From: Kali Guaman  Sent: 9/11/2017  12:12 PM  To: Jennifer Love Staff    Pt would like to be called back regarding speaking with nurse. Pt was on the phone with nurse when the call dropped      Pt can be reached at [phone]. Or cell [phone].

## 2017-09-29 ENCOUNTER — TELEPHONE (OUTPATIENT)
Dept: INTERNAL MEDICINE | Facility: CLINIC | Age: 67
End: 2017-09-29

## 2017-09-29 NOTE — TELEPHONE ENCOUNTER
----- Message from Hudson Palmer sent at 9/29/2017 10:22 AM CDT -----  Contact: self/ [phone]   Type: Orders Request    What orders/ testing are being requested? Mammogram    Is there a future appointment scheduled for the patient with PCP? No    When?    Comments: Pt would like orders put in and scheduled on 10/13/2017 around 11 am in the [facility].  She would like a call when the appt has been made.  Please call and advise.    Thank you

## 2017-10-01 DIAGNOSIS — Z13.9 ENCOUNTER FOR SCREENING: Primary | ICD-10-CM

## 2017-10-03 DIAGNOSIS — E13.59 OTHER SPECIFIED DIABETES MELLITUS WITH OTHER CIRCULATORY COMPLICATION, WITH LONG-TERM CURRENT USE OF INSULIN: Primary | ICD-10-CM

## 2017-10-03 DIAGNOSIS — Z79.4 OTHER SPECIFIED DIABETES MELLITUS WITH OTHER CIRCULATORY COMPLICATION, WITH LONG-TERM CURRENT USE OF INSULIN: Primary | ICD-10-CM

## 2017-10-03 DIAGNOSIS — Z91.199 NONCOMPLIANCE WITH DIABETES TREATMENT: ICD-10-CM

## 2017-10-04 ENCOUNTER — OUTPATIENT CASE MANAGEMENT (OUTPATIENT)
Dept: ADMINISTRATIVE | Facility: OTHER | Age: 67
End: 2017-10-04

## 2017-10-04 NOTE — PROGRESS NOTES
Thank you for the referral.  Patient has been assigned to KEIRA Swartz for low risk screening for Outpatient Case Management.     Reason for referral: Low risk    Other specified diabetes mellitus with other circulatory complication, with long-term current use of insulin  Noncompliance with diabetes treatment    Thank you,    Alexandra Black, SSC

## 2017-10-05 ENCOUNTER — OUTPATIENT CASE MANAGEMENT (OUTPATIENT)
Dept: ADMINISTRATIVE | Facility: OTHER | Age: 67
End: 2017-10-05

## 2017-10-05 NOTE — PROGRESS NOTES
Attempt #:  1  This CSW attempted to reach patient/caregiver to provide resource and left a message requesting a return call.      This CSW received a referral on the above patient.   Reason for referral:non compliance with diabetes treatment  Name of the community resource that was provided:SNAP Application, [place] Commodity Supplemental Food Program, Medicaid Transportation,   Resource given to: Patient  via [place] Mail and Telephone     This CSW completed assessment with patient. Patient reports she lives with spouse. Patient reports she is able to complete all ADL's. Patient reports she uses a walker and a cane to ambulate. Patient denied needing assistance with shelter, medical or medication, however seeking resources for food. CSW provided patient with SNAP and Commodity Supplemental Food Program Information. Patient reports spouse and neighbor provides transportation , however seeking additional transit resources. CSW provided patient with Medicaid Transportation information. Patient reports she is able to afford all medication. Patient reports she has all diabetic supplies. Patient reports she fully understands how to take her insulin. Patient reports she is compliant with diabetes. Patient reports she has been in to see a  Diabetes Educator in July.  Patient reports she has been working with [employer] shoes for  foot wear. Patient reports shoe company has faxed paperwork over for PCP, however no response. CSW sent an in basket message to Dr. Villatoro office asking them to please reach out to [employer] to provide assistance with completing the necessary paperwork [phone].  CSW mailed all resources and provided her contact information for any additional needs.

## 2017-10-06 ENCOUNTER — LAB VISIT (OUTPATIENT)
Dept: LAB | Facility: HOSPITAL | Age: 67
End: 2017-10-06
Attending: NURSE PRACTITIONER
Payer: MEDICARE

## 2017-10-06 DIAGNOSIS — E11.42 TYPE 2 DIABETES MELLITUS WITH DIABETIC POLYNEUROPATHY, WITH LONG-TERM CURRENT USE OF INSULIN: ICD-10-CM

## 2017-10-06 DIAGNOSIS — Z79.4 TYPE 2 DIABETES MELLITUS WITH DIABETIC POLYNEUROPATHY, WITH LONG-TERM CURRENT USE OF INSULIN: ICD-10-CM

## 2017-10-06 LAB
ESTIMATED AVG GLUCOSE: 223 MG/DL
HBA1C MFR BLD HPLC: 9.4 %

## 2017-10-06 PROCEDURE — 83036 HEMOGLOBIN GLYCOSYLATED A1C: CPT

## 2017-10-06 PROCEDURE — 36415 COLL VENOUS BLD VENIPUNCTURE: CPT | Mod: PO

## 2017-10-09 ENCOUNTER — TELEPHONE (OUTPATIENT)
Dept: INTERNAL MEDICINE | Facility: CLINIC | Age: 67
End: 2017-10-09

## 2017-10-09 NOTE — TELEPHONE ENCOUNTER
----- Message from Anna Camp sent at 10/9/2017 10:31 AM CDT -----  Contact: call pt [phone]  or [phone]  Patient would like to get a referral.  Does the patient already have the specialty clinic appointment scheduled:    If yes, what date is the appointment scheduled:     Referral to what specialty:  mammo  Reason (be specific):  annual  Does the patient want the referral with a specific physician:  [facility]  [facility]  Comments:  Would like to have done on 10/13/17  After 741

## 2017-10-11 ENCOUNTER — TELEPHONE (OUTPATIENT)
Dept: INTERNAL MEDICINE | Facility: CLINIC | Age: 67
End: 2017-10-11

## 2017-10-11 NOTE — TELEPHONE ENCOUNTER
----- Message from Danna Hilton sent at 10/11/2017  9:58 AM CDT -----  Contact: self [phone]  Type: Orders Request    What orders/ testing are being requested?Mammo     Is there a future appointment scheduled for the patient with PCP? No      When?    Comments:  Patient stated she would like her mammogram appointment on 10/13/17 between 10 and 10:30 am

## 2017-10-12 ENCOUNTER — OUTPATIENT CASE MANAGEMENT (OUTPATIENT)
Dept: ADMINISTRATIVE | Facility: OTHER | Age: 67
End: 2017-10-12

## 2017-10-12 NOTE — PROGRESS NOTES
CSW received a message on recorder to contact patient . CSW attempt to contact patient. No answer . CSW left a message for a return call.    CSW received a return call from patient . Patient reports she needs assistance with completeing SNAP application. CSW provided the phone number to [facility] for assistance [phone]

## 2017-10-13 ENCOUNTER — OFFICE VISIT (OUTPATIENT)
Dept: ENDOCRINOLOGY | Facility: CLINIC | Age: 67
End: 2017-10-13
Payer: MEDICARE

## 2017-10-13 ENCOUNTER — OFFICE VISIT (OUTPATIENT)
Dept: INTERNAL MEDICINE | Facility: CLINIC | Age: 67
End: 2017-10-13
Payer: MEDICARE

## 2017-10-13 VITALS
HEIGHT: 65 IN | HEART RATE: 66 BPM | SYSTOLIC BLOOD PRESSURE: 142 MMHG | BODY MASS INDEX: 48.82 KG/M2 | DIASTOLIC BLOOD PRESSURE: 68 MMHG | WEIGHT: 293 LBS

## 2017-10-13 VITALS
SYSTOLIC BLOOD PRESSURE: 130 MMHG | DIASTOLIC BLOOD PRESSURE: 74 MMHG | OXYGEN SATURATION: 98 % | WEIGHT: 293 LBS | BODY MASS INDEX: 48.82 KG/M2 | HEIGHT: 65 IN | HEART RATE: 78 BPM

## 2017-10-13 DIAGNOSIS — E11.42 TYPE 2 DIABETES MELLITUS WITH DIABETIC POLYNEUROPATHY, WITH LONG-TERM CURRENT USE OF INSULIN: ICD-10-CM

## 2017-10-13 DIAGNOSIS — Z79.4 TYPE 2 DIABETES MELLITUS WITH DIABETIC POLYNEUROPATHY, WITH LONG-TERM CURRENT USE OF INSULIN: ICD-10-CM

## 2017-10-13 DIAGNOSIS — K21.9 GASTROESOPHAGEAL REFLUX DISEASE WITHOUT ESOPHAGITIS: ICD-10-CM

## 2017-10-13 DIAGNOSIS — I51.89 DIASTOLIC DYSFUNCTION: Chronic | ICD-10-CM

## 2017-10-13 DIAGNOSIS — E11.42 TYPE 2 DIABETES MELLITUS WITH DIABETIC POLYNEUROPATHY, WITH LONG-TERM CURRENT USE OF INSULIN: Primary | ICD-10-CM

## 2017-10-13 DIAGNOSIS — Z13.9 ENCOUNTER FOR SCREENING: Primary | ICD-10-CM

## 2017-10-13 DIAGNOSIS — I10 ESSENTIAL HYPERTENSION: ICD-10-CM

## 2017-10-13 DIAGNOSIS — Z79.4 TYPE 2 DIABETES MELLITUS WITH DIABETIC POLYNEUROPATHY, WITH LONG-TERM CURRENT USE OF INSULIN: Primary | ICD-10-CM

## 2017-10-13 LAB
CREAT UR-MCNC: 63 MG/DL
MICROALBUMIN UR DL<=1MG/L-MCNC: 10 UG/ML
MICROALBUMIN/CREATININE RATIO: 15.9 UG/MG

## 2017-10-13 PROCEDURE — 99214 OFFICE O/P EST MOD 30 MIN: CPT | Mod: S$PBB,,, | Performed by: NURSE PRACTITIONER

## 2017-10-13 PROCEDURE — 99213 OFFICE O/P EST LOW 20 MIN: CPT | Mod: PBBFAC | Performed by: FAMILY MEDICINE

## 2017-10-13 PROCEDURE — 99214 OFFICE O/P EST MOD 30 MIN: CPT | Mod: PBBFAC,27 | Performed by: NURSE PRACTITIONER

## 2017-10-13 PROCEDURE — 82570 ASSAY OF URINE CREATININE: CPT

## 2017-10-13 PROCEDURE — 99214 OFFICE O/P EST MOD 30 MIN: CPT | Mod: S$PBB,,, | Performed by: FAMILY MEDICINE

## 2017-10-13 PROCEDURE — 99999 PR PBB SHADOW E&M-EST. PATIENT-LVL III: CPT | Mod: PBBFAC,,, | Performed by: FAMILY MEDICINE

## 2017-10-13 PROCEDURE — 99999 PR PBB SHADOW E&M-EST. PATIENT-LVL IV: CPT | Mod: PBBFAC,,, | Performed by: NURSE PRACTITIONER

## 2017-10-13 NOTE — PROGRESS NOTES
Subjective:       Patient ID: Annika Mac is a 66 y.o. female.    Chief Complaint: No chief complaint on file.  Annika Mac 66 y.o. female is here for office visit to review care and physical exam, was to have usual care, has significant medical needs, compliance being one of them.  Has CBgs, most all 200-300s.  HPI  Review of Systems   Constitutional: Negative for activity change, fatigue, fever and unexpected weight change.   HENT: Negative for congestion, hearing loss, postnasal drip and rhinorrhea.    Eyes: Negative for redness and visual disturbance.   Respiratory: Negative for chest tightness, shortness of breath and wheezing.    Cardiovascular: Negative for chest pain, palpitations and leg swelling.   Gastrointestinal: Negative for abdominal distention.   Genitourinary: Negative for decreased urine volume, dysuria, flank pain, hematuria, pelvic pain and urgency.   Musculoskeletal: Negative for back pain, gait problem, joint swelling and neck stiffness.   Skin: Negative for color change, rash and wound.   Neurological: Negative for dizziness, syncope, weakness and headaches.   Psychiatric/Behavioral: Negative for behavioral problems, confusion and sleep disturbance. The patient is not nervous/anxious.        Objective:      Physical Exam   Constitutional: She is oriented to person, place, and time. She appears well-developed and well-nourished. No distress.   HENT:   Head: Normocephalic.   Mouth/Throat: No oropharyngeal exudate.   Eyes: EOM are normal. Pupils are equal, round, and reactive to light. No scleral icterus.   Neck: Neck supple. No JVD present. No thyromegaly present.   Cardiovascular: Normal rate, regular rhythm and normal heart sounds.  Exam reveals no gallop and no friction rub.    No murmur heard.  Pulmonary/Chest: Effort normal and breath sounds normal. She has no wheezes. She has no rales.   Abdominal: Soft. Bowel sounds are normal. She exhibits no distension and no mass. There is no  tenderness. There is no guarding.   Musculoskeletal: Normal range of motion. She exhibits no edema.   Lymphadenopathy:     She has no cervical adenopathy.   Neurological: She is alert and oriented to person, place, and time. She has normal reflexes. She displays normal reflexes. No cranial nerve deficit. She exhibits normal muscle tone.   Skin: Skin is warm. No rash noted. No erythema.   Psychiatric: She has a normal mood and affect. Thought content normal.       Assessment:       No diagnosis found.    Plan:       Annika was seen today for follow-up.    Diagnoses and all orders for this visit:    Encounter for screening  -     Mammo Digital Screening Bilateral With CAD; Future  -     Ambulatory referral to Outpatient Case Management    Type 2 diabetes mellitus with diabetic polyneuropathy, with long-term current use of insulin  - Case mgt to help with bariers to compliance, needs ANTHONY score, rtc for CBg review and pharm asist with tx plan  Diastolic dysfunction  - chart reviewed  Essential hypertension  =- follow  Gastroesophageal reflux disease without esophagitis  - cghart reviewed

## 2017-10-13 NOTE — PATIENT INSTRUCTIONS
Humulin U500:    Increase breakfast dose to 95 units for a regular breakfast, but take 90 units for a smaller breakfast.    Continue lunch dose 65 units for a regular lunch, but take 70 units for a bigger lunch.     Increase dinner dose to 30 units for a regular dinner, but take 15 units for a smaller dinner.

## 2017-10-13 NOTE — PROGRESS NOTES
"CC: This 66 y.o. female presents for management of Diabetes Mellitus     HPI: Diagnosed with T2DM in July 1987 when c/o polydipsia and polyuria. Started oral agents then NPH and Regular insulin. Converted to MDI with Lantus and Novolog in 2/2006 after knee surgery. D/c TZD r/t weight gain 7/08 and added Symlin. Stopped Symlin 2/09. Januvia added 2/10. D/C Januvia 12/10 r/t cost; resumed though pt assistance in 2016. Converted to U500 in 7/10.   Received DM Education 1/22/08.   11/2013 she experienced severe hypoglycemia at home,  called 911, "nonresponsive" so brought to local ED for hypoglycemia BG 30s in 11/2013.     Medical conditions also include obesity, RUFINA, diastolic dysfunction, h/o stroke, PAD, and CKD 3.  Follows with cardiology, vascular medicine and nephrology.   Also has chronic back pain, which limits activity and contributes to wt gain. Has followed with physical medicine.   Has chronic cough 2/2 GERD.  DM complicated with PDR with ME, PN.    She is again on U500 as BGs. Overall BGs improved, still above goal and still with variability, but no longer markedly elevated.  Readings 100s- mid 200s, few 300s.  Basal only trial per PCPand MDI trial unsuccessful with marked elevation, likely r/t volume/absorption.   Reports she has discussed Tresiba trial with PCP today.   Today she reports she feels better, no longer feels tired.  No hypoglycemia. Can recognize and treat symtpoms.     DM ed reports to have good knowledge of CHO portions.     CURRENT DM MEDS: Januvia 50mg QD.  Humulin U500 pen: breakfast dose 90U, lunch dose 65U/ for bigger lunch increase to 70U; dinner dose 25 units/ for smaller dinner, decrease  dose to 15 units.     Denies missed doses.  Has Medicare Extra Help    Eats 3 meals daily, + snacks  Drinks water, unsweet tea.   No formal exercise.     Standards of Care:  Eye exam: 10/16  Podiatry: 8/2017    ROS:   Gen: Appetite good, denies fatigue and weakness.  Skin: No rashes, no hair " "changes.  Eyes: Denies visual disturbances  Resp: no SOB or MCFADDEN, no cough today  Cardiac: No palpitations, chest pain, no edema   GI: No nausea or vomiting, diarrhea, constipation, or abdominal pain.  MS/Neuro: Denies numbness/ tingling in BLE; Gait steady, speech clear  Chronic back pain.   Psych: Denies drug/ETOH abuse, no hx of depression.  Other systems: negative.    PE: BP (!) 142/68   Pulse 66   Ht 5' 5" (1.651 m)   Wt (!) 141.3 kg (311 lb 6.4 oz)   BMI 51.82 kg/m²     GENERAL: Well developed, well nourished.  PSYCH: AAOx3, appropriate mood and affect, pleasant expression, conversant, appears relaxed, well groomed.   EYES: Conjunctiva, corneas clear  NECK: Supple, trachea midline.  CHEST: Resp even and unlabored  CARDIAC: RRR, +2/6 murmur lower left sternal border  ABDOMEN: Soft, non-tender, non-distended; +BSs x4  VASCULAR: DP pulses +2/4 bilaterally, mild edema.  NEURO: Gait steady  SKINSkin warm and dry, + acanthosis nigracans.  FEET:  Deferred/     Hemoglobin A1C   Date Value Ref Range Status   10/06/2017 9.4 (H) 4.0 - 5.6 % Final     Comment:     According to ADA guidelines, hemoglobin A1c <7.0% represents  optimal control in non-pregnant diabetic patients. Different  metrics may apply to specific patient populations.   Standards of Medical Care in Diabetes-2016.  For the purpose of screening for the presence of diabetes:  <5.7%     Consistent with the absence of diabetes  5.7-6.4%  Consistent with increasing risk for diabetes   (prediabetes)  >or=6.5%  Consistent with diabetes  Currently, no consensus exists for use of hemoglobin A1c  for diagnosis of diabetes for children.  This Hemoglobin A1c assay has significant interference with fetal   hemoglobin   (HbF). The results are invalid for patients with abnormal amounts of   HbF,   including those with known Hereditary Persistence   of Fetal Hemoglobin. Heterozygous hemoglobin variants (HbAS, HbAC,   HbAD, HbAE, HbA2) do not significantly interfere " with this assay;   however, presence of multiple variants in a sample may impact the %   interference.     06/30/2017 9.8 (H) 4.0 - 5.6 % Final     Comment:     According to ADA guidelines, hemoglobin A1c <7.0% represents  optimal control in non-pregnant diabetic patients. Different  metrics may apply to specific patient populations.   Standards of Medical Care in Diabetes-2016.  For the purpose of screening for the presence of diabetes:  <5.7%     Consistent with the absence of diabetes  5.7-6.4%  Consistent with increasing risk for diabetes   (prediabetes)  >or=6.5%  Consistent with diabetes  Currently, no consensus exists for use of hemoglobin A1c  for diagnosis of diabetes for children.  This Hemoglobin A1c assay has significant interference with fetal   hemoglobin   (HbF). The results are invalid for patients with abnormal amounts of   HbF,   including those with known Hereditary Persistence   of Fetal Hemoglobin. Heterozygous hemoglobin variants (HbAS, HbAC,   HbAD, HbAE, HbA2) do not significantly interfere with this assay;   however, presence of multiple variants in a sample may impact the %   interference.     03/21/2017 9.4 (H) 4.5 - 6.2 % Final     Comment:     According to ADA guidelines, hemoglobin A1C <7.0% represents  optimal control in non-pregnant diabetic patients.  Different  metrics may apply to specific populations.   Standards of Medical Care in Diabetes - 2016.  For the purpose of screening for the presence of diabetes:  <5.7%     Consistent with the absence of diabetes  5.7-6.4%  Consistent with increasing risk for diabetes   (prediabetes)  >or=6.5%  Consistent with diabetes  Currently no consensus exists for use of hemoglobin A1C  for diagnosis of diabetes for children.       ASSESSMENT and PLAN:    1. T2DM: uncontrolled, with neuropathy, retinopathy, nephropathy.   - Humulin U500:  Increase breakfast dose to 95 units for a regular breakfast, but take 90 units for a smaller  breakfast.  Continue lunch dose 65 units for a regular lunch, but take 70 units for a bigger lunch.   Increase dinner dose to 30 units for a regular dinner, but take 15 units for a smaller dinner.   - Continue Januvia 50mg QAM.     - Consider CGMS testing moving forward.     BG Ac/HS. Send logs in 10 days. Call sooner for hypoglycemia.   Hypoglycemia management reviewed. Carry glucose.     Discussed A1c goals.     - takes ASA, ARB, statin     2. PDR with macular edema - optimize BG. Continue follow with Ophth.      3. CKD stage III - GFR 35.7, on ACEi therapy. No metformin or SLGT2i. Saw nephrology 5/2017.     4. Peripheral neuropathy - on Lyrica and Cymbalta with relief. Off gabapentin.      5. HLP - LDL trending down. Continue statin.      6. Morbid obesity  Body mass index is 51.82 kg/m².  Increases insulin resistance. Chronic pain limits physical activity.  Discussed medical wt loss. She is not interested in wt management at this time.     7. HTN - Controlled. On ARB.       Follow UP  RTC in 3 mths

## 2017-10-16 ENCOUNTER — TELEPHONE (OUTPATIENT)
Dept: ENDOCRINOLOGY | Facility: CLINIC | Age: 67
End: 2017-10-16

## 2017-10-16 NOTE — TELEPHONE ENCOUNTER
----- Message from Jessi Paulino sent at 10/13/2017  3:56 PM CDT -----  Contact: Self [phone]  Pt is requesting to have her after visit summary mailed to her address on file.

## 2017-10-18 ENCOUNTER — OUTPATIENT CASE MANAGEMENT (OUTPATIENT)
Dept: ADMINISTRATIVE | Facility: OTHER | Age: 67
End: 2017-10-18

## 2017-10-18 NOTE — PROGRESS NOTES
Thank you for the referral.  Patient has been assigned to KEIRA Swartz for low risk screening for Outpatient Case Management.     Reason for referral: Encounter for screening    Patient was recently enrolled into [facility] and screened by KEIRA Swartz.     Thank you,  Niurka Abdi

## 2017-10-20 RX ORDER — LABETALOL 300 MG/1
TABLET, FILM COATED ORAL
Qty: 120 TABLET | Refills: 3 | Status: SHIPPED | OUTPATIENT
Start: 2017-10-20 | End: 2018-01-18 | Stop reason: SDUPTHER

## 2017-10-24 ENCOUNTER — HOSPITAL ENCOUNTER (OUTPATIENT)
Dept: RADIOLOGY | Facility: HOSPITAL | Age: 67
Discharge: HOME | End: 2017-10-24
Attending: FAMILY MEDICINE
Payer: MEDICARE

## 2017-10-24 ENCOUNTER — OFFICE VISIT (OUTPATIENT)
Dept: OPHTHALMOLOGY | Facility: CLINIC | Age: 67
End: 2017-10-24
Payer: MEDICARE

## 2017-10-24 VITALS — DIASTOLIC BLOOD PRESSURE: 70 MMHG | SYSTOLIC BLOOD PRESSURE: 151 MMHG | HEART RATE: 60 BPM

## 2017-10-24 VITALS — WEIGHT: 293 LBS | BODY MASS INDEX: 48.82 KG/M2 | HEIGHT: 65 IN

## 2017-10-24 DIAGNOSIS — Z13.9 ENCOUNTER FOR SCREENING: ICD-10-CM

## 2017-10-24 DIAGNOSIS — H35.373 EPIRETINAL MEMBRANE (ERM) OF BOTH EYES: ICD-10-CM

## 2017-10-24 DIAGNOSIS — Z12.31 VISIT FOR SCREENING MAMMOGRAM: ICD-10-CM

## 2017-10-24 PROCEDURE — 99999 PR PBB SHADOW E&M-EST. PATIENT-LVL III: CPT | Mod: PBBFAC,,, | Performed by: OPHTHALMOLOGY

## 2017-10-24 PROCEDURE — 77067 SCR MAMMO BI INCL CAD: CPT | Mod: TC

## 2017-10-24 PROCEDURE — 92226 PR SPECIAL EYE EXAM, SUBSEQUENT: CPT | Mod: 50,PBBFAC | Performed by: OPHTHALMOLOGY

## 2017-10-24 PROCEDURE — 77063 BREAST TOMOSYNTHESIS BI: CPT | Mod: 26,,, | Performed by: RADIOLOGY

## 2017-10-24 PROCEDURE — 92134 CPTRZ OPH DX IMG PST SGM RTA: CPT | Mod: PBBFAC | Performed by: OPHTHALMOLOGY

## 2017-10-24 PROCEDURE — 92226 PR SPECIAL EYE EXAM, SUBSEQUENT: CPT | Mod: 50,S$PBB,, | Performed by: OPHTHALMOLOGY

## 2017-10-24 PROCEDURE — 99213 OFFICE O/P EST LOW 20 MIN: CPT | Mod: PBBFAC | Performed by: OPHTHALMOLOGY

## 2017-10-24 PROCEDURE — 77067 SCR MAMMO BI INCL CAD: CPT | Mod: 26,,, | Performed by: RADIOLOGY

## 2017-10-24 PROCEDURE — 92014 COMPRE OPH EXAM EST PT 1/>: CPT | Mod: S$PBB,,, | Performed by: OPHTHALMOLOGY

## 2017-10-24 NOTE — PROGRESS NOTES
HPI     Yearly check  DLS 10/18/2016 Dr. Maldonado    Pt sts vision fluctuates some days worse than others.   Denies pain   (-)Flashes (-)Floaters  (-)Photophobia  (-)Glare    Denies use of gtts   LBS- 217 today     Hemoglobin A1C       Date                     Value               Ref Range             Status                10/06/2017               9.4 (H)             4.0 - 5.6 %           Final                 06/30/2017               9.8 (H)             4.0 - 5.6 %           Final                 03/21/2017               9.4 (H)             4.5 - 6.2 %           Final            ----------    A/P    1-PCO with retained lens material:   -Following with Dr. Glover       2-PDR OU:  T2 uncontrolled on insulin   -S/P PRP OU   -PRP OS most recently 11/25/13 with new subhyaloid heme then, got Avastin 11/13/13   -Last HbA1c was 8.6 on 2/9/15   -Emphasized the importance of tight glucose control    3-ERM OU:   -Good VA, monitor    4-PCIOL OU:   -2002 OS by Rosendo   -2012 OD by Dr. Glover        F/U 1 year  OCT

## 2017-10-26 ENCOUNTER — LAB VISIT (OUTPATIENT)
Dept: LAB | Facility: HOSPITAL | Age: 67
End: 2017-10-26
Attending: INTERNAL MEDICINE
Payer: MEDICARE

## 2017-10-26 ENCOUNTER — IMMUNIZATION (OUTPATIENT)
Dept: INTERNAL MEDICINE | Facility: CLINIC | Age: 67
End: 2017-10-26
Payer: MEDICARE

## 2017-10-26 DIAGNOSIS — B37.2 CANDIDIASIS, CUTANEOUS: ICD-10-CM

## 2017-10-26 DIAGNOSIS — I10 ESSENTIAL HYPERTENSION: ICD-10-CM

## 2017-10-26 DIAGNOSIS — E78.5 DYSLIPIDEMIA: Chronic | ICD-10-CM

## 2017-10-26 DIAGNOSIS — E66.01 MORBID OBESITY DUE TO EXCESS CALORIES: Chronic | ICD-10-CM

## 2017-10-26 DIAGNOSIS — E55.9 VITAMIN D INSUFFICIENCY: ICD-10-CM

## 2017-10-26 DIAGNOSIS — D50.9 IRON DEFICIENCY ANEMIA, UNSPECIFIED IRON DEFICIENCY ANEMIA TYPE: Chronic | ICD-10-CM

## 2017-10-26 DIAGNOSIS — N18.30 CKD (CHRONIC KIDNEY DISEASE) STAGE 3, GFR 30-59 ML/MIN: Chronic | ICD-10-CM

## 2017-10-26 DIAGNOSIS — K21.9 GASTROESOPHAGEAL REFLUX DISEASE WITHOUT ESOPHAGITIS: ICD-10-CM

## 2017-10-26 DIAGNOSIS — M1A.00X0 IDIOPATHIC CHRONIC GOUT WITHOUT TOPHUS, UNSPECIFIED SITE: ICD-10-CM

## 2017-10-26 LAB
25(OH)D3+25(OH)D2 SERPL-MCNC: 37 NG/ML
ALBUMIN SERPL BCP-MCNC: 3.4 G/DL
ANION GAP SERPL CALC-SCNC: 9 MMOL/L
BASOPHILS # BLD AUTO: 0.03 K/UL
BASOPHILS # BLD AUTO: 0.03 K/UL
BASOPHILS NFR BLD: 0.6 %
BASOPHILS NFR BLD: 0.6 %
BUN SERPL-MCNC: 26 MG/DL
CALCIUM SERPL-MCNC: 10.1 MG/DL
CHLORIDE SERPL-SCNC: 106 MMOL/L
CO2 SERPL-SCNC: 25 MMOL/L
CREAT SERPL-MCNC: 1.7 MG/DL
DIFFERENTIAL METHOD: ABNORMAL
DIFFERENTIAL METHOD: ABNORMAL
EOSINOPHIL # BLD AUTO: 0.4 K/UL
EOSINOPHIL # BLD AUTO: 0.4 K/UL
EOSINOPHIL NFR BLD: 6.8 %
EOSINOPHIL NFR BLD: 6.8 %
ERYTHROCYTE [DISTWIDTH] IN BLOOD BY AUTOMATED COUNT: 14.2 %
ERYTHROCYTE [DISTWIDTH] IN BLOOD BY AUTOMATED COUNT: 14.2 %
EST. GFR  (AFRICAN AMERICAN): 35.5 ML/MIN/1.73 M^2
EST. GFR  (NON AFRICAN AMERICAN): 30.8 ML/MIN/1.73 M^2
GLUCOSE SERPL-MCNC: 192 MG/DL
HCT VFR BLD AUTO: 33.5 %
HCT VFR BLD AUTO: 33.5 %
HGB BLD-MCNC: 10.3 G/DL
HGB BLD-MCNC: 10.3 G/DL
IMM GRANULOCYTES # BLD AUTO: 0.01 K/UL
IMM GRANULOCYTES # BLD AUTO: 0.01 K/UL
IMM GRANULOCYTES NFR BLD AUTO: 0.2 %
IMM GRANULOCYTES NFR BLD AUTO: 0.2 %
LYMPHOCYTES # BLD AUTO: 1.4 K/UL
LYMPHOCYTES # BLD AUTO: 1.4 K/UL
LYMPHOCYTES NFR BLD: 27 %
LYMPHOCYTES NFR BLD: 27 %
MCH RBC QN AUTO: 27.8 PG
MCH RBC QN AUTO: 27.8 PG
MCHC RBC AUTO-ENTMCNC: 30.7 G/DL
MCHC RBC AUTO-ENTMCNC: 30.7 G/DL
MCV RBC AUTO: 90 FL
MCV RBC AUTO: 90 FL
MONOCYTES # BLD AUTO: 0.5 K/UL
MONOCYTES # BLD AUTO: 0.5 K/UL
MONOCYTES NFR BLD: 10.2 %
MONOCYTES NFR BLD: 10.2 %
NEUTROPHILS # BLD AUTO: 2.9 K/UL
NEUTROPHILS # BLD AUTO: 2.9 K/UL
NEUTROPHILS NFR BLD: 55.2 %
NEUTROPHILS NFR BLD: 55.2 %
NRBC BLD-RTO: 0 /100 WBC
NRBC BLD-RTO: 0 /100 WBC
PHOSPHATE SERPL-MCNC: 2.4 MG/DL
PLATELET # BLD AUTO: 251 K/UL
PLATELET # BLD AUTO: 251 K/UL
PMV BLD AUTO: 11.4 FL
PMV BLD AUTO: 11.4 FL
POTASSIUM SERPL-SCNC: 5 MMOL/L
PTH-INTACT SERPL-MCNC: 107 PG/ML
RBC # BLD AUTO: 3.71 M/UL
RBC # BLD AUTO: 3.71 M/UL
SODIUM SERPL-SCNC: 140 MMOL/L
URATE SERPL-MCNC: 8.2 MG/DL
WBC # BLD AUTO: 5.29 K/UL
WBC # BLD AUTO: 5.29 K/UL

## 2017-10-26 PROCEDURE — 82306 VITAMIN D 25 HYDROXY: CPT

## 2017-10-26 PROCEDURE — 84550 ASSAY OF BLOOD/URIC ACID: CPT

## 2017-10-26 PROCEDURE — G0008 ADMIN INFLUENZA VIRUS VAC: HCPCS | Mod: PBBFAC,PO

## 2017-10-26 PROCEDURE — 85025 COMPLETE CBC W/AUTO DIFF WBC: CPT

## 2017-10-26 PROCEDURE — 83970 ASSAY OF PARATHORMONE: CPT

## 2017-10-26 PROCEDURE — 80069 RENAL FUNCTION PANEL: CPT

## 2017-10-26 PROCEDURE — 36415 COLL VENOUS BLD VENIPUNCTURE: CPT | Mod: PO

## 2017-10-30 ENCOUNTER — LAB VISIT (OUTPATIENT)
Dept: LAB | Facility: HOSPITAL | Age: 67
End: 2017-10-30
Attending: INTERNAL MEDICINE
Payer: MEDICARE

## 2017-10-30 ENCOUNTER — OFFICE VISIT (OUTPATIENT)
Dept: HEMATOLOGY/ONCOLOGY | Facility: CLINIC | Age: 67
End: 2017-10-30
Payer: MEDICARE

## 2017-10-30 DIAGNOSIS — E66.01 MORBID OBESITY DUE TO EXCESS CALORIES: Chronic | ICD-10-CM

## 2017-10-30 DIAGNOSIS — D64.9 ANEMIA, UNSPECIFIED TYPE: Primary | Chronic | ICD-10-CM

## 2017-10-30 DIAGNOSIS — D64.9 ANEMIA, UNSPECIFIED TYPE: Chronic | ICD-10-CM

## 2017-10-30 LAB
ALBUMIN SERPL BCP-MCNC: 3.2 G/DL
ALP SERPL-CCNC: 131 U/L
ALT SERPL W/O P-5'-P-CCNC: 16 U/L
ANION GAP SERPL CALC-SCNC: 6 MMOL/L
AST SERPL-CCNC: 24 U/L
BILIRUB SERPL-MCNC: 0.2 MG/DL
BUN SERPL-MCNC: 30 MG/DL
CALCIUM SERPL-MCNC: 9.3 MG/DL
CHLORIDE SERPL-SCNC: 107 MMOL/L
CO2 SERPL-SCNC: 24 MMOL/L
CREAT SERPL-MCNC: 1.9 MG/DL
ERYTHROCYTE [DISTWIDTH] IN BLOOD BY AUTOMATED COUNT: 14.1 %
EST. GFR  (AFRICAN AMERICAN): 31 ML/MIN/1.73 M^2
EST. GFR  (NON AFRICAN AMERICAN): 26.9 ML/MIN/1.73 M^2
GLUCOSE SERPL-MCNC: 327 MG/DL
HCT VFR BLD AUTO: 29.8 %
HGB BLD-MCNC: 9.6 G/DL
IMM GRANULOCYTES # BLD AUTO: 0.03 K/UL
MCH RBC QN AUTO: 28.2 PG
MCHC RBC AUTO-ENTMCNC: 32.2 G/DL
MCV RBC AUTO: 88 FL
NEUTROPHILS # BLD AUTO: 3.5 K/UL
PLATELET # BLD AUTO: 213 K/UL
PMV BLD AUTO: 10.4 FL
POTASSIUM SERPL-SCNC: 5.5 MMOL/L
PROT SERPL-MCNC: 6.2 G/DL
RBC # BLD AUTO: 3.4 M/UL
SODIUM SERPL-SCNC: 137 MMOL/L
WBC # BLD AUTO: 5.96 K/UL

## 2017-10-30 PROCEDURE — 85027 COMPLETE CBC AUTOMATED: CPT

## 2017-10-30 PROCEDURE — 80053 COMPREHEN METABOLIC PANEL: CPT

## 2017-10-30 PROCEDURE — 99213 OFFICE O/P EST LOW 20 MIN: CPT | Mod: S$PBB,,, | Performed by: INTERNAL MEDICINE

## 2017-10-30 PROCEDURE — 36415 COLL VENOUS BLD VENIPUNCTURE: CPT

## 2017-10-30 NOTE — PROGRESS NOTES
Subjective:       Patient ID: Annika Mac is a 66 y.o. female.    Chief Complaint: No chief complaint on file.    Anemia        Mrs. Mac presents today for followup for her longstanding anemia. Briefly, she is a morbidly obese 64-year-old -American female with longstanding anemia, whom I have followed expectantly for at least the last six years. She is here today for her scheduled followup appointment.   Her CBC from earlier today shows a WBC count is 5,900 /mm3, Hg 9.6 gr/dl, Ht 29.;8 %, MCV 88 and platelet count of 213,000 /mm3.   Three more stool samples that she submitted today were again negative for occult blood.    Review of Systems  Overall she feels OK. She again complains of her long standing arthritis, and is having difficulty ambulating.  She is also complaining of constipation and states that she occasionally has bleeding hemorrhoids.  She denies any anxiety, depression, easy bruising, fevers, chills, night sweats, weight loss, nausea, vomiting, diarrhea, diplopia, blurred vision, epistaxi,s hematuria, or headaches.      Objective:      Physical Exam  GENERAL: She is alert, oriented to time, place, person, pleasant, well nourished, in no acute physical distress.   VITAL SIGNS: Reviewed.   HEENT: Normal. There are no nasal, oral, lip, gingival, auricular, lid, conjunctival lesions. Pupils are equal, reactive to light and   accommodation and extraocular muscle movements are intact. Mucosae are moist and pink, and there is no thrush.   NECK: Supple without JVD, adenopathy, or thyromegaly.   LUNGS: Clear to auscultation without wheezing, rales, or rhonchi.   CARDIOVASCULAR: Reveals an S1, S2, no murmurs, no rubs, no gallops.   ABDOMEN: Obese, soft, nontender, without organomegaly. Bowel sounds are present. A median abdominal scar is seen extending from the umbilicus to the symphysis pubis.   EXTREMITIES: No cyanosis or clubbing. There is 1(+) edema at the ankle level bilaterally.   SKIN: Does not  have petechiae, rashes, induration, or ecchymoses.   NEUROLOGIC: Motor function is 5/5, DTRs are 0 to 1+ bilaterally, symmetrical, and cranial nerves within normal limits.   LYMPHATIC: There is no cervical, axillary, or supraclavicular adenopathy.   Assessment:       1. Anemia, chronic, stable      2.    Morbid obesity  3.      Diabetes  Plan:         I have asked her to return to see me in 6 months, and she will submit three stool samples at that time.  Her questions were answered to her satisfaction.

## 2017-11-02 ENCOUNTER — OFFICE VISIT (OUTPATIENT)
Dept: NEPHROLOGY | Facility: CLINIC | Age: 67
End: 2017-11-02
Payer: MEDICARE

## 2017-11-02 VITALS
WEIGHT: 293 LBS | SYSTOLIC BLOOD PRESSURE: 110 MMHG | DIASTOLIC BLOOD PRESSURE: 58 MMHG | HEIGHT: 65 IN | HEART RATE: 69 BPM | OXYGEN SATURATION: 99 % | BODY MASS INDEX: 48.82 KG/M2

## 2017-11-02 DIAGNOSIS — N18.4 ANEMIA OF CHRONIC RENAL FAILURE, STAGE 4 (SEVERE): ICD-10-CM

## 2017-11-02 DIAGNOSIS — D50.8 OTHER IRON DEFICIENCY ANEMIA: Primary | Chronic | ICD-10-CM

## 2017-11-02 DIAGNOSIS — E78.2 MIXED HYPERLIPIDEMIA: ICD-10-CM

## 2017-11-02 DIAGNOSIS — N18.4 DRUG OR CHEMICAL INDUCED DIABETES MELLITUS WITH STAGE 4 CHRONIC KIDNEY DISEASE, WITHOUT LONG-TERM CURRENT USE OF INSULIN: ICD-10-CM

## 2017-11-02 DIAGNOSIS — E09.22 DRUG OR CHEMICAL INDUCED DIABETES MELLITUS WITH STAGE 4 CHRONIC KIDNEY DISEASE, WITHOUT LONG-TERM CURRENT USE OF INSULIN: ICD-10-CM

## 2017-11-02 DIAGNOSIS — E08.22 DIABETES MELLITUS DUE TO UNDERLYING CONDITION WITH STAGE 4 CHRONIC KIDNEY DISEASE, WITHOUT LONG-TERM CURRENT USE OF INSULIN: ICD-10-CM

## 2017-11-02 DIAGNOSIS — D63.1 ANEMIA OF CHRONIC RENAL FAILURE, STAGE 4 (SEVERE): ICD-10-CM

## 2017-11-02 DIAGNOSIS — I10 ESSENTIAL HYPERTENSION: ICD-10-CM

## 2017-11-02 DIAGNOSIS — N18.30 CKD (CHRONIC KIDNEY DISEASE) STAGE 3, GFR 30-59 ML/MIN: Chronic | ICD-10-CM

## 2017-11-02 DIAGNOSIS — N18.4 DIABETES MELLITUS DUE TO UNDERLYING CONDITION WITH STAGE 4 CHRONIC KIDNEY DISEASE, WITHOUT LONG-TERM CURRENT USE OF INSULIN: ICD-10-CM

## 2017-11-02 PROCEDURE — 99999 PR PBB SHADOW E&M-EST. PATIENT-LVL III: CPT | Mod: PBBFAC,,, | Performed by: INTERNAL MEDICINE

## 2017-11-02 PROCEDURE — 99213 OFFICE O/P EST LOW 20 MIN: CPT | Mod: S$PBB,,, | Performed by: INTERNAL MEDICINE

## 2017-11-02 PROCEDURE — 99213 OFFICE O/P EST LOW 20 MIN: CPT | Mod: PBBFAC | Performed by: INTERNAL MEDICINE

## 2017-11-02 RX ORDER — FERROUS SULFATE 325(65) MG
325 TABLET ORAL 2 TIMES DAILY
Refills: 0 | COMMUNITY
Start: 2017-11-02 | End: 2018-11-02

## 2017-11-02 RX ORDER — SIMVASTATIN 40 MG/1
20 TABLET, FILM COATED ORAL NIGHTLY
Qty: 90 TABLET | Refills: 4 | Status: SHIPPED | OUTPATIENT
Start: 2017-11-02 | End: 2018-05-22 | Stop reason: SDUPTHER

## 2017-11-02 RX ORDER — IRBESARTAN 300 MG/1
150 TABLET ORAL NIGHTLY
Qty: 90 TABLET | Refills: 2 | Status: SHIPPED | OUTPATIENT
Start: 2017-11-02 | End: 2018-01-18 | Stop reason: SDUPTHER

## 2017-11-02 RX ORDER — SPIRONOLACTONE 25 MG/1
12.5 TABLET ORAL DAILY
Qty: 90 TABLET | Refills: 0 | Status: SHIPPED | OUTPATIENT
Start: 2017-11-02 | End: 2018-05-22 | Stop reason: SDUPTHER

## 2017-11-02 RX ORDER — AMLODIPINE BESYLATE 5 MG/1
5 TABLET ORAL DAILY
Qty: 90 TABLET | Refills: 1 | Status: SHIPPED | OUTPATIENT
Start: 2017-11-02 | End: 2018-05-22 | Stop reason: SDUPTHER

## 2017-11-02 NOTE — Clinical Note
Please tell the patient to reduce her spironolactone to half a tablet daily Total dose 12.5 mg a day

## 2017-11-02 NOTE — PROGRESS NOTES
Subjective:       Patient ID:   Annika Mac is a 66 y.o. Black or  female who presents for new evaluation of CKD.  Ms. Mac is a very pleasant female with a long history of DM and HTN. She is having difficulty managing diabetes at present and is scheduled for continuous monitoring. Most recent creatinine is 1.8. Her creatinine did spike at 2.5 in January 2016. After discussion, she did recall a severe gout attack and use of colchicine at the time.       HPI   Her blood sugars have been elevated as high as 273 on labs. no fever or chills, taking spironolactone and avapro and lasix, but she is not on a low K diet.  Urinating well, but burning. Recent UA negative and UPRCT no proteinuria.No recent gout.  Hemoglobin A1c 9.4, serum creatinine now 1.9.  Severe morbid obesity in a wheelchair  K5.5    Review of Systems   Constitutional: Negative for activity change, appetite change, chills, diaphoresis, fatigue, fever and unexpected weight change.   HENT: Negative for congestion, facial swelling and trouble swallowing.    Eyes: Negative for pain, discharge, redness and visual disturbance.   Respiratory: Negative for cough, shortness of breath and wheezing.    Cardiovascular: Negative for chest pain, palpitations and leg swelling.   Gastrointestinal: Negative for abdominal distention, abdominal pain, constipation and diarrhea.   Endocrine: Negative for cold intolerance and heat intolerance.   Genitourinary: Negative for decreased urine volume, difficulty urinating, dysuria, flank pain, frequency and urgency.   Musculoskeletal: Positive for arthralgias. Negative for joint swelling and myalgias.   Skin: Negative for color change.   Allergic/Immunologic: Negative for immunocompromised state.   Neurological: Negative for dizziness, tremors, syncope, speech difficulty, weakness, light-headedness and numbness.   Hematological: Negative for adenopathy.   Psychiatric/Behavioral: Negative for agitation, confusion,  "decreased concentration and dysphoric mood.       Objective:     Blood pressure (!) 110/58, pulse 69, height 5' 5" (1.651 m), weight (!) 142 kg (313 lb), SpO2 99 %.      Physical Exam   Constitutional: She is oriented to person, place, and time. She appears well-developed and well-nourished.   Morbid obesity precludes much of exam, no apparent distress, sitting in a wheelchair   HENT:   Head: Normocephalic and atraumatic.   Eyes: Conjunctivae and EOM are normal. Pupils are equal, round, and reactive to light.   Neck: Neck supple.   Cardiovascular: Normal rate, regular rhythm, normal heart sounds and intact distal pulses.    +1 bilateral LE edema   Pulmonary/Chest: Effort normal and breath sounds normal.   Abdominal: Soft. Bowel sounds are normal.   Musculoskeletal: Normal range of motion.   Neurological: She is alert and oriented to person, place, and time. She has normal reflexes.   Skin: Skin is warm and dry.   Psychiatric: She has a normal mood and affect. Her behavior is normal.   Nursing note and vitals reviewed.      Assessment:       1. Other iron deficiency anemia    2. Essential hypertension    3. Mixed hyperlipidemia    4. Anemia of chronic renal failure, stage 4 (severe)    5. Diabetes mellitus due to underlying condition with stage 4 chronic kidney disease, without long-term current use of insulin     6. 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 Currnetly serum creatinine stable at 1.6-1.8.   Will order serologies and hep studies and renal US.  1. CKD 4  Estimated GFR 25-30 cc/m.  Slowly progressive  Gout: Follow follow   uric acid and continue allopurinol last 8.2       Lab Results   Component Value Date    CREATININE 1.9 (H) 10/30/2017     Protein Creatinine " Ratios: No proteinuria. Ua from today pending.    check U TX CT next the    Prot/Creat Ratio, Ur   Date Value Ref Range Status   05/16/2017 Unable to calculate 0.00 - 0.20 Final   10/10/2016 Unable to calculate 0.00 - 0.20 Final   05/09/2016 0.03 0.00 - 0.20 Final     ·   ·   Hyperkalmeia: borderline serum potassiums  Counseled on low K diet. reduce Aldactone 12.5  irbesartan reduced to 160 mg.  If still no improvement will need to stop the Aldactone.    Repeat RFP in 2 weeks   Lab Results   Component Value Date     10/30/2017    K 5.5 (H) 10/30/2017    CO2 24 10/30/2017     2. HTN: Controlled on current regimen. With reduction in spironolactone and irbesartan begin amlodipine 5 mg daily     3. Renal osteodystrophy: stable PTH level check  Vitamin D.   Lab Results   Component Value Date    .0 (H) 10/26/2017    CALCIUM 9.3 10/30/2017    CAION 1.27 02/28/2012    PHOS 2.4 (L) 10/26/2017       4. Anemia: Stable. Asymptomatic. iron sulfate 325 mg twice a day GI evaluation colonoscope and EGD suggested GI referral placed, check irons again in 3 months may need iron infusion   Lab Results   Component Value Date    HGB 9.6 (L) 10/30/2017        5. DM:  Poorly controlled. Had nutritional counseling today.f/u pcp    Lab Results   Component Value Date    HGBA1C 9.4 (H) 10/06/2017       6. Lipid management: Controlled. Continue Statin.   Lab Results   Component Value Date    LDLCALC 77.2 06/30/2017         Follow up in 3 months.

## 2017-11-02 NOTE — PATIENT INSTRUCTIONS
1. Labs: repeat serum k, rfp amd cpk, lfts  in 2 weeks on lower dose of irbesartan and aldactone   Low K diet     2.  Medications:  stop protonix and use pepcid 20 mg twice daily instead    Reduce irbesartan to 1/2 tab ( only 150 mg) daily  Reduce spironolactone to 12.5 mg daily  Add amlodipine 5 mg daily  Reduce zocor ( simvistatin to 1/2 tab ( 20 mg) daily     3. Referrals: Gi referral     4. Follow up with PCP regarding: talk to your PCP about switching from protonix to pepcid    5. BP:  Take BP and pulse  twice daily for one week, record              Bring results  to next visit.              Goal :   <140/90    6. Diet:         Sodium: < 2000 milligrams daily including all food and drink      (one teaspoon of table salt has 2300 milligrams of sodium)              Phosphorus: <1000mg daily   2 gm potassium      7. Return to clinic:

## 2017-11-16 ENCOUNTER — DOCUMENTATION ONLY (OUTPATIENT)
Dept: NEPHROLOGY | Facility: CLINIC | Age: 67
End: 2017-11-16

## 2017-11-16 ENCOUNTER — LAB VISIT (OUTPATIENT)
Dept: LAB | Facility: HOSPITAL | Age: 67
End: 2017-11-16
Attending: INTERNAL MEDICINE
Payer: MEDICARE

## 2017-11-16 DIAGNOSIS — E08.22 DIABETES MELLITUS DUE TO UNDERLYING CONDITION WITH STAGE 4 CHRONIC KIDNEY DISEASE, WITHOUT LONG-TERM CURRENT USE OF INSULIN: ICD-10-CM

## 2017-11-16 DIAGNOSIS — I10 ESSENTIAL HYPERTENSION: ICD-10-CM

## 2017-11-16 DIAGNOSIS — N18.4 DIABETES MELLITUS DUE TO UNDERLYING CONDITION WITH STAGE 4 CHRONIC KIDNEY DISEASE, WITHOUT LONG-TERM CURRENT USE OF INSULIN: ICD-10-CM

## 2017-11-16 DIAGNOSIS — E09.22 DRUG OR CHEMICAL INDUCED DIABETES MELLITUS WITH STAGE 4 CHRONIC KIDNEY DISEASE, WITHOUT LONG-TERM CURRENT USE OF INSULIN: ICD-10-CM

## 2017-11-16 DIAGNOSIS — E78.2 MIXED HYPERLIPIDEMIA: ICD-10-CM

## 2017-11-16 DIAGNOSIS — E87.5 HYPERKALEMIA: Primary | ICD-10-CM

## 2017-11-16 DIAGNOSIS — N18.4 DRUG OR CHEMICAL INDUCED DIABETES MELLITUS WITH STAGE 4 CHRONIC KIDNEY DISEASE, WITHOUT LONG-TERM CURRENT USE OF INSULIN: ICD-10-CM

## 2017-11-16 LAB
ALBUMIN SERPL BCP-MCNC: 3.3 G/DL
ALP SERPL-CCNC: 137 U/L
ALT SERPL W/O P-5'-P-CCNC: 14 U/L
ANION GAP SERPL CALC-SCNC: 8 MMOL/L
ANION GAP SERPL CALC-SCNC: 8 MMOL/L
AST SERPL-CCNC: 18 U/L
BASOPHILS # BLD AUTO: 0.04 K/UL
BASOPHILS NFR BLD: 0.8 %
BILIRUB DIRECT SERPL-MCNC: 0.1 MG/DL
BILIRUB SERPL-MCNC: 0.2 MG/DL
BUN SERPL-MCNC: 33 MG/DL
BUN SERPL-MCNC: 33 MG/DL
CALCIUM SERPL-MCNC: 9.7 MG/DL
CALCIUM SERPL-MCNC: 9.7 MG/DL
CHLORIDE SERPL-SCNC: 104 MMOL/L
CHLORIDE SERPL-SCNC: 104 MMOL/L
CO2 SERPL-SCNC: 22 MMOL/L
CO2 SERPL-SCNC: 22 MMOL/L
CREAT SERPL-MCNC: 1.8 MG/DL
CREAT SERPL-MCNC: 1.8 MG/DL
DIFFERENTIAL METHOD: ABNORMAL
EOSINOPHIL # BLD AUTO: 0.3 K/UL
EOSINOPHIL NFR BLD: 5.7 %
ERYTHROCYTE [DISTWIDTH] IN BLOOD BY AUTOMATED COUNT: 14.1 %
EST. GFR  (AFRICAN AMERICAN): 33.1 ML/MIN/1.73 M^2
EST. GFR  (AFRICAN AMERICAN): 33.1 ML/MIN/1.73 M^2
EST. GFR  (NON AFRICAN AMERICAN): 28.7 ML/MIN/1.73 M^2
EST. GFR  (NON AFRICAN AMERICAN): 28.7 ML/MIN/1.73 M^2
FERRITIN SERPL-MCNC: 138 NG/ML
GLUCOSE SERPL-MCNC: 419 MG/DL
GLUCOSE SERPL-MCNC: 419 MG/DL
HCT VFR BLD AUTO: 31.7 %
HGB BLD-MCNC: 9.9 G/DL
IMM GRANULOCYTES # BLD AUTO: 0.02 K/UL
IMM GRANULOCYTES NFR BLD AUTO: 0.4 %
LYMPHOCYTES # BLD AUTO: 1.5 K/UL
LYMPHOCYTES NFR BLD: 28.4 %
MCH RBC QN AUTO: 27.3 PG
MCHC RBC AUTO-ENTMCNC: 31.2 G/DL
MCV RBC AUTO: 88 FL
MONOCYTES # BLD AUTO: 0.4 K/UL
MONOCYTES NFR BLD: 8.2 %
NEUTROPHILS # BLD AUTO: 3 K/UL
NEUTROPHILS NFR BLD: 56.5 %
NRBC BLD-RTO: 0 /100 WBC
PHOSPHATE SERPL-MCNC: 3.2 MG/DL
PHOSPHATE SERPL-MCNC: 3.2 MG/DL
PLATELET # BLD AUTO: 241 K/UL
PMV BLD AUTO: 10.6 FL
POTASSIUM SERPL-SCNC: 5.4 MMOL/L
POTASSIUM SERPL-SCNC: 5.4 MMOL/L
PROT SERPL-MCNC: 6.6 G/DL
RBC # BLD AUTO: 3.62 M/UL
SODIUM SERPL-SCNC: 134 MMOL/L
SODIUM SERPL-SCNC: 134 MMOL/L
WBC # BLD AUTO: 5.24 K/UL

## 2017-11-16 PROCEDURE — 36415 COLL VENOUS BLD VENIPUNCTURE: CPT

## 2017-11-16 PROCEDURE — 85025 COMPLETE CBC W/AUTO DIFF WBC: CPT

## 2017-11-16 PROCEDURE — 82728 ASSAY OF FERRITIN: CPT

## 2017-11-16 PROCEDURE — 83540 ASSAY OF IRON: CPT

## 2017-11-16 PROCEDURE — 80069 RENAL FUNCTION PANEL: CPT

## 2017-11-16 PROCEDURE — 84075 ASSAY ALKALINE PHOSPHATASE: CPT

## 2017-11-17 LAB
IRON SERPL-MCNC: 55 UG/DL
SATURATED IRON: 17 %
TOTAL IRON BINDING CAPACITY: 326 UG/DL
TRANSFERRIN SERPL-MCNC: 220 MG/DL

## 2017-11-17 NOTE — PROGRESS NOTES
k still 5.4 on reduced doses of aldactone and irbesartan.  Will discontinue aldactone  Repeat labs in 4 weeks

## 2017-11-21 ENCOUNTER — OFFICE VISIT (OUTPATIENT)
Dept: PODIATRY | Facility: CLINIC | Age: 67
End: 2017-11-21
Payer: MEDICARE

## 2017-11-21 ENCOUNTER — OFFICE VISIT (OUTPATIENT)
Dept: PHYSICAL MEDICINE AND REHAB | Facility: CLINIC | Age: 67
End: 2017-11-21
Payer: MEDICARE

## 2017-11-21 VITALS
SYSTOLIC BLOOD PRESSURE: 122 MMHG | WEIGHT: 293 LBS | BODY MASS INDEX: 48.82 KG/M2 | DIASTOLIC BLOOD PRESSURE: 56 MMHG | HEIGHT: 65 IN | HEART RATE: 68 BPM

## 2017-11-21 VITALS
SYSTOLIC BLOOD PRESSURE: 127 MMHG | HEART RATE: 62 BPM | HEIGHT: 65 IN | WEIGHT: 293 LBS | BODY MASS INDEX: 48.82 KG/M2 | RESPIRATION RATE: 18 BRPM | DIASTOLIC BLOOD PRESSURE: 56 MMHG

## 2017-11-21 DIAGNOSIS — H26.493 PCO (POSTERIOR CAPSULAR OPACIFICATION), BILATERAL: ICD-10-CM

## 2017-11-21 DIAGNOSIS — M25.561 ACUTE PAIN OF RIGHT KNEE: ICD-10-CM

## 2017-11-21 DIAGNOSIS — M79.2 RADICULAR PAIN IN LEFT ARM: ICD-10-CM

## 2017-11-21 DIAGNOSIS — E11.49 TYPE II DIABETES MELLITUS WITH NEUROLOGICAL MANIFESTATIONS: Primary | ICD-10-CM

## 2017-11-21 DIAGNOSIS — G63 POLYNEUROPATHY ASSOCIATED WITH UNDERLYING DISEASE: ICD-10-CM

## 2017-11-21 DIAGNOSIS — E11.43 DIABETIC AUTONOMIC NEUROPATHY ASSOCIATED WITH TYPE 2 DIABETES MELLITUS: ICD-10-CM

## 2017-11-21 DIAGNOSIS — M47.816 LUMBAR FACET ARTHROPATHY: ICD-10-CM

## 2017-11-21 DIAGNOSIS — M54.40 CHRONIC LOW BACK PAIN WITH SCIATICA, SCIATICA LATERALITY UNSPECIFIED, UNSPECIFIED BACK PAIN LATERALITY: ICD-10-CM

## 2017-11-21 DIAGNOSIS — M17.11 PRIMARY OSTEOARTHRITIS OF RIGHT KNEE: ICD-10-CM

## 2017-11-21 DIAGNOSIS — G89.29 CHRONIC LOW BACK PAIN WITH SCIATICA, SCIATICA LATERALITY UNSPECIFIED, UNSPECIFIED BACK PAIN LATERALITY: ICD-10-CM

## 2017-11-21 DIAGNOSIS — M47.22 OSTEOARTHRITIS OF SPINE WITH RADICULOPATHY, CERVICAL REGION: ICD-10-CM

## 2017-11-21 DIAGNOSIS — L84 CORN OR CALLUS: ICD-10-CM

## 2017-11-21 DIAGNOSIS — B35.1 ONYCHOMYCOSIS DUE TO DERMATOPHYTE: ICD-10-CM

## 2017-11-21 DIAGNOSIS — M48.062 SPINAL STENOSIS OF LUMBAR REGION WITH NEUROGENIC CLAUDICATION: ICD-10-CM

## 2017-11-21 DIAGNOSIS — Z86.73 HISTORY OF STROKE: ICD-10-CM

## 2017-11-21 PROCEDURE — 99499 UNLISTED E&M SERVICE: CPT | Mod: S$PBB,,, | Performed by: PODIATRIST

## 2017-11-21 PROCEDURE — 11721 DEBRIDE NAIL 6 OR MORE: CPT | Mod: 59,Q9,PBBFAC | Performed by: PODIATRIST

## 2017-11-21 PROCEDURE — 99213 OFFICE O/P EST LOW 20 MIN: CPT | Mod: PBBFAC | Performed by: PODIATRIST

## 2017-11-21 PROCEDURE — 99999 PR PBB SHADOW E&M-EST. PATIENT-LVL III: CPT | Mod: PBBFAC,,, | Performed by: PHYSICAL MEDICINE & REHABILITATION

## 2017-11-21 PROCEDURE — 11056 PARNG/CUTG B9 HYPRKR LES 2-4: CPT | Mod: Q9,S$PBB,, | Performed by: PODIATRIST

## 2017-11-21 PROCEDURE — 99999 PR PBB SHADOW E&M-EST. PATIENT-LVL III: CPT | Mod: PBBFAC,,, | Performed by: PODIATRIST

## 2017-11-21 PROCEDURE — 99214 OFFICE O/P EST MOD 30 MIN: CPT | Mod: S$PBB,,, | Performed by: PHYSICAL MEDICINE & REHABILITATION

## 2017-11-21 PROCEDURE — 11721 DEBRIDE NAIL 6 OR MORE: CPT | Mod: 59,Q9,S$PBB, | Performed by: PODIATRIST

## 2017-11-21 PROCEDURE — 99213 OFFICE O/P EST LOW 20 MIN: CPT | Mod: PBBFAC,25,27 | Performed by: PHYSICAL MEDICINE & REHABILITATION

## 2017-11-21 PROCEDURE — 11056 PARNG/CUTG B9 HYPRKR LES 2-4: CPT | Mod: Q9,PBBFAC | Performed by: PODIATRIST

## 2017-11-21 RX ORDER — HYDROCODONE BITARTRATE AND ACETAMINOPHEN 10; 325 MG/1; MG/1
1 TABLET ORAL EVERY 8 HOURS PRN
Qty: 90 TABLET | Refills: 0 | Status: SHIPPED | OUTPATIENT
Start: 2017-11-21 | End: 2017-12-21

## 2017-11-21 RX ORDER — HYDROCODONE BITARTRATE AND ACETAMINOPHEN 10; 325 MG/1; MG/1
1 TABLET ORAL EVERY 8 HOURS PRN
Qty: 90 TABLET | Refills: 0 | Status: SHIPPED | OUTPATIENT
Start: 2018-01-21 | End: 2018-01-19

## 2017-11-21 RX ORDER — PREGABALIN 150 MG/1
150 CAPSULE ORAL 2 TIMES DAILY
Qty: 60 CAPSULE | Refills: 2 | Status: SHIPPED | OUTPATIENT
Start: 2017-11-21 | End: 2017-12-21

## 2017-11-21 RX ORDER — HYDROCODONE BITARTRATE AND ACETAMINOPHEN 10; 325 MG/1; MG/1
1 TABLET ORAL EVERY 8 HOURS PRN
Qty: 90 TABLET | Refills: 0 | Status: SHIPPED | OUTPATIENT
Start: 2017-12-21 | End: 2018-02-21 | Stop reason: SDUPTHER

## 2017-11-21 NOTE — PROGRESS NOTES
Subjective:      Patient ID: Annika Mac is a 66 y.o. female.    Chief Complaint: PCP (Cody Villatoro MD 10/13/17); Diabetic Foot Exam; Nail Care; and Callouses (under the 4th toe left foot )    Annika is a 66 y.o. female who presents to the clinic for evaluation and treatment of high risk feet. Annika has a past medical history of Allergy; Anemia (7/27/2012); Arthritis; CHF (congestive heart failure); CKD (chronic kidney disease) stage 3, GFR 30-59 ml/min (7/27/2012); Clotting disorder; Deep vein thrombophlebitis of left leg (7/27/2012); Degenerative disc disease; Diabetic peripheral neuropathy associated with type 2 diabetes mellitus (7/27/2012); GERD (gastroesophageal reflux disease); HTN (hypertension) (7/27/2012); Hyperlipidemia (7/27/2012); Lead-induced gout of ankle or foot; Nonproliferative diabetic retinopathy of right eye (7/27/2012); Obstructive sleep apnea (7/27/2012); Osteoporosis; Proliferative diabetic retinopathy of left eye (7/27/2012); Stroke (8/1/2003); Type 2 diabetes mellitus with diabetic polyneuropathy (7/27/2012); and Ulcer. The patient's chief complaint is long, thick toenailsThis patient has documented high risk feet requiring routine maintenance secondary to diabetes mellitis and those secondary complications of diabetes, as mentioned..       PCP: Cody Villatoro MD    Date Last Seen by PCP:   Chief Complaint   Patient presents with    PCP     Cody Villatoro MD 10/13/17    Diabetic Foot Exam    Nail Care    Callouses     under the 4th toe left foot        Chief Complaint   Patient presents with    PCP     Cody Villatoro MD 10/13/17    Diabetic Foot Exam    Nail Care    Callouses     under the 4th toe left foot        Current shoe gear: black leather DM shoes w/ custom inserts     Hemoglobin A1C   Date Value Ref Range Status   10/06/2017 9.4 (H) 4.0 - 5.6 % Final     Comment:     According to ADA guidelines, hemoglobin A1c <7.0% represents  optimal control in non-pregnant diabetic  patients. Different  metrics may apply to specific patient populations.   Standards of Medical Care in Diabetes-2016.  For the purpose of screening for the presence of diabetes:  <5.7%     Consistent with the absence of diabetes  5.7-6.4%  Consistent with increasing risk for diabetes   (prediabetes)  >or=6.5%  Consistent with diabetes  Currently, no consensus exists for use of hemoglobin A1c  for diagnosis of diabetes for children.  This Hemoglobin A1c assay has significant interference with fetal   hemoglobin   (HbF). The results are invalid for patients with abnormal amounts of   HbF,   including those with known Hereditary Persistence   of Fetal Hemoglobin. Heterozygous hemoglobin variants (HbAS, HbAC,   HbAD, HbAE, HbA2) do not significantly interfere with this assay;   however, presence of multiple variants in a sample may impact the %   interference.     06/30/2017 9.8 (H) 4.0 - 5.6 % Final     Comment:     According to ADA guidelines, hemoglobin A1c <7.0% represents  optimal control in non-pregnant diabetic patients. Different  metrics may apply to specific patient populations.   Standards of Medical Care in Diabetes-2016.  For the purpose of screening for the presence of diabetes:  <5.7%     Consistent with the absence of diabetes  5.7-6.4%  Consistent with increasing risk for diabetes   (prediabetes)  >or=6.5%  Consistent with diabetes  Currently, no consensus exists for use of hemoglobin A1c  for diagnosis of diabetes for children.  This Hemoglobin A1c assay has significant interference with fetal   hemoglobin   (HbF). The results are invalid for patients with abnormal amounts of   HbF,   including those with known Hereditary Persistence   of Fetal Hemoglobin. Heterozygous hemoglobin variants (HbAS, HbAC,   HbAD, HbAE, HbA2) do not significantly interfere with this assay;   however, presence of multiple variants in a sample may impact the %   interference.     03/21/2017 9.4 (H) 4.5 - 6.2 % Final      Comment:     According to ADA guidelines, hemoglobin A1C <7.0% represents  optimal control in non-pregnant diabetic patients.  Different  metrics may apply to specific populations.   Standards of Medical Care in Diabetes - 2016.  For the purpose of screening for the presence of diabetes:  <5.7%     Consistent with the absence of diabetes  5.7-6.4%  Consistent with increasing risk for diabetes   (prediabetes)  >or=6.5%  Consistent with diabetes  Currently no consensus exists for use of hemoglobin A1C  for diagnosis of diabetes for children.         Review of Systems   Constitution: Negative for chills, decreased appetite and fever.   Cardiovascular: Negative for leg swelling.   Skin: Positive for dry skin and nail changes.   Musculoskeletal: Positive for arthritis. Negative for back pain, gout, joint pain, joint swelling and myalgias.   Gastrointestinal: Negative for nausea and vomiting.   Neurological: Positive for numbness. Negative for loss of balance and paresthesias.           Objective:      Physical Exam   Constitutional: She is oriented to person, place, and time. She appears well-developed and well-nourished. No distress.   Cardiovascular:   Dorsalis pedis and posterior tibial pulses are diminished Bilaterally. Toes are cool to touch. Feet are warm proximally.There is decreased digital hair. Skin is atrophic, slightly hyperpigmented, and mildly edematous       Musculoskeletal: Normal range of motion. She exhibits no edema, tenderness or deformity.   Equinus noted b/l ankles, gastroc. Adequate joint range of motion without pain, limitation, nor crepitation Bilateral pedal joints. Muscle strength is 5/5 in all groups bilaterally.        Neurological: She is alert and oriented to person, place, and time.   Artemus-Vincent 5.07 monofilamant testing is diminished [place] feet. Sharp/dull sensation diminished Bilaterally. Light touch absent Bilaterally.       Skin: Skin is warm, dry and intact. No abrasion, no  bruising, no burn, no laceration, no lesion, no petechiae and no rash noted. She is not diaphoretic. No pallor.   Nails 1-5 b/l  are elongated by  2-5mm's, thickened by 3-4 mm's, dystrophic, and are darkened in  coloration . Xerosis Bilaterally. No open lesions noted.      Hyperkeratotic tissue noted to distal 2nd toe b/l      Psychiatric: She has a normal mood and affect. Thought content normal.   Nursing note and vitals reviewed.            Assessment:       Encounter Diagnoses   Name Primary?    Type II diabetes mellitus with neurological manifestations Yes    Onychomycosis due to dermatophyte     Corn or callus          Plan:       Annika was seen today for pcp, diabetic foot exam, nail care and callouses.    Diagnoses and all orders for this visit:    Type II diabetes mellitus with neurological manifestations    Onychomycosis due to dermatophyte    Corn or callus      I counseled the patient on her conditions, their implications and medical management.        - Shoe inspection. Diabetic Foot Education. Patient reminded of the importance of good nutrition and blood sugar control to help prevent podiatric complications of diabetes. Patient instructed on proper foot hygeine. We discussed wearing proper shoe gear, daily foot inspections, never walking without protective shoe gear, never putting sharp instruments to feet, routine podiatric nail visits every 2-3 months.      - With patient's permission, nails were aggressively reduced and debrided x 10 to their soft tissue attachment mechanically and with electric , removing all offending nail and debris. Patient relates relief following the procedure. She will continue to monitor the areas daily, inspect her feet, wear protective shoe gear when ambulatory, moisturizer to maintain skin integrity and follow in this office in approximately 2-3 months, sooner p.r.n.    - After cleansing the  area w/ alcohol prep pad the above mentioned hyperkeratosis was trimmed  utilizing No 15 scapel, to a smooth base with out incident. Patient tolerated this  well and reported comfort to the area of distal 2nd toe b/l

## 2017-11-21 NOTE — PROGRESS NOTES
"Subjective:       Patient ID: Annika Mac is a 66 y.o. female.    Chief Complaint: Back Pain and Knee Pain    Back Pain   Associated symptoms include leg pain. Pertinent negatives include no abdominal pain, chest pain, fever, numbness or weakness. Headaches:     Knee Pain    Pertinent negatives include no numbness.   Arm Pain    Pertinent negatives include no chest pain or numbness.   Leg Pain    Pertinent negatives include no numbness.   Patient is 67 y/o female , who returns to clinic for chronic back pain.   LCV 08/04/17.    Today complains about back pain, leg pain, and Rt knee pain.   Current knee pain is 6-7,wprst pan is 10/10, best is 3-4 with meds.  Current back pain is  6,  worst pain are7/10, best is 3-4 with meds.  Pain  Is present at all time, accross lower back in midline of tail bone.    Back pain is more dull pain, and leg pain is more shooting pain art the top of thighs,   and bellow the knee.   Pain is present daytime, and radiates to both legs on front of legs.  Cannot walk <  ft using a cane, stands straight < 5 minutes.   She has to lean forward even with cane or during walking.   Back pain increases, and leg become weak.   She has diabetic neuropathy in both feet, top and bottom, tingling pain.  Does not have "charilie horses".   Sitting down helps a little bit, and lying down  ( on side, or stomach).   She failed Neurontin 300 mg  for maybe 6 yrs, and takes 3 pills a day, that does not help much.   And , Gabapentin is eventually changed to  Lyrica  100 mg that she takes daily    ( approved by insurance) , and pain in hands is gone.   Tolerates Lyrica well.   She has been getting NAYLA since 2011, by Dr. Quinn and [place] with NAYLA, with some pain relief.   She had a second B/l L5/S1 TF NAYLA on 08/18/16, and she reported that helped a lot > 80% and lasted for only 2 weeks.   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   She has SC, manual WC, and RW .  Here for follow up, re evaluation and treatment.     Past Medical History:   Diagnosis Date    Allergy     Anemia 2012    Arthritis     CHF (congestive heart failure)     CKD (chronic kidney disease) stage 3, GFR 30-59 ml/min 2012    Clotting disorder     Deep vein thrombophlebitis of left leg 2012    Degenerative disc disease     Diabetic peripheral neuropathy associated with type 2 diabetes mellitus 2012    GERD (gastroesophageal reflux disease)     HTN (hypertension) 2012    Hyperlipidemia 2012    Lead-induced gout of ankle or foot     right going on three weeks    Nonproliferative diabetic retinopathy of right eye 2012    Obstructive sleep apnea 2012    Osteoporosis     Proliferative diabetic retinopathy of left eye 2012    Stroke 2003    Type 2 diabetes mellitus with diabetic polyneuropathy 2012    Ulcer        Past Surgical History:   Procedure Laterality Date    CATARACT EXTRACTION W/  INTRAOCULAR LENS IMPLANT Left 3/2002    left     CATARACT EXTRACTION W/  INTRAOCULAR LENS IMPLANT Right     OD dr. olivares     SECTION      CYST REMOVAL  2011    left side of face    EYE SURGERY Bilateral 2012    eye implants    GANGLION CYST EXCISION  2007    Right wrist    Pan Retinal Photocoagulation Bilateral     Dr. Monterroso (Proliferative Diabetic Retinopathy)    TOTAL ABDOMINAL HYSTERECTOMY      TOTAL KNEE ARTHROPLASTY  2006    left    TRIGGER FINGER RELEASE  2009       Family History   Problem Relation Age of Onset    Diabetes Mother     Hypertension Mother     Heart disease Father     Diabetes Sister     Thyroid disease Brother     Diabetes Brother     Hypertension Brother      Amblyopia Neg Hx     Blindness Neg Hx     Glaucoma Neg Hx     Macular degeneration Neg Hx     Retinal detachment Neg Hx     Strabismus Neg Hx        Social History     Social History    Marital status:      Spouse name: Heber    Number of children: 3    Years of education: N/A     Social History Main Topics    Smoking status: Never Smoker    Smokeless tobacco: Never Used    Alcohol use No    Drug use: No    Sexual activity: Yes     Partners: Male     Other Topics Concern    None     Social History Narrative    , lives with family; 3 children, 2 grandchildren       Current Outpatient Prescriptions   Medication Sig Dispense Refill    albuterol 90 mcg/actuation inhaler Inhale 2 puffs into the lungs every 6 (six) hours as needed for Wheezing. 1 Inhaler 0    allopurinol (ZYLOPRIM) 100 MG tablet Take 1 tablet (100 mg total) by mouth once daily. 30 tablet 3    amLODIPine (NORVASC) 5 MG tablet Take 1 tablet (5 mg total) by mouth once daily. 90 tablet 1    aspirin 81 MG Chew Take 1 tablet (81 mg total) by mouth once daily.  0    azelastine (ASTELIN) 137 mcg (0.1 %) nasal spray 1 spray (137 mcg total) by Nasal route 2 (two) times daily. 30 mL 11    blood-glucose meter kit Use as instructed, true test/result meter 1 each 0    colchicine 0.6 mg tablet Take 1 tablet (0.6 mg total) by mouth once daily. 30 tablet 3    duloxetine (CYMBALTA) 30 MG capsule Take 1 capsule (30 mg total) by mouth once daily. 90 capsule 3    ferrous sulfate 325 mg (65 mg iron) Tab tablet Take 1 tablet (325 mg total) by mouth 2 (two) times daily.  0    fexofenadine (ALLEGRA) 180 MG tablet TAKE 1 TABLET BY MOUTH ONCE DAILY 30 tablet 0    fluticasone (FLONASE) 50 mcg/actuation nasal spray 2 sprays by Each Nare route once daily. 48 g 6    furosemide (LASIX) 20 MG tablet Take 1 tablet by mouth once a day 90 tablet 3    hydrocodone-acetaminophen 10-325mg (NORCO)  mg Tab Take 1 tablet by mouth every 8 (eight)  "hours as needed for Pain. 90 tablet 0    [START ON 12/21/2017] hydrocodone-acetaminophen 10-325mg (NORCO)  mg Tab Take 1 tablet by mouth every 8 (eight) hours as needed for Pain. 90 tablet 0    [START ON 1/21/2018] hydrocodone-acetaminophen 10-325mg (NORCO)  mg Tab Take 1 tablet by mouth every 8 (eight) hours as needed for Pain. 90 tablet 0    insulin regular hum U-500 conc (HUMULIN R U-500, CONC, KWIKPEN) 500 unit/mL (3 mL) InPn Inject 15-95 Units into the skin 3 (three) times daily before meals. 11 Syringe 1    irbesartan (AVAPRO) 300 MG tablet Take 0.5 tablets (150 mg total) by mouth every evening. 90 tablet 2    labetalol (NORMODYNE) 300 MG tablet TAKE 1 TABLET BY MOUTH TWO  TIMES DAILY 120 tablet 3    lancets 31 gauge Misc 1 lancet by Misc.(Non-Drug; Combo Route) route 4 (four) times daily. 150 each 6    levocetirizine (XYZAL) 5 MG tablet Take 1 tablet (5 mg total) by mouth every evening. 7 tablet 0    montelukast (SINGULAIR) 10 mg tablet TAKE 1 TABLET(10 MG) BY MOUTH EVERY EVENING 90 tablet 0    nystatin (MYCOSTATIN) powder Apply topically 3 (three) times daily. 60 g 2    pantoprazole (PROTONIX) 40 MG tablet Take 1 tablet (40 mg total) by mouth once daily. 90 tablet 3    pen needle, diabetic 32 gauge x 5/32" Ndle Use with multiple daily insulin injections 100 each 12    pregabalin (LYRICA) 150 MG capsule Take 1 capsule (150 mg total) by mouth 2 (two) times daily. 60 capsule 2    simvastatin (ZOCOR) 40 MG tablet Take 0.5 tablets (20 mg total) by mouth every evening. 90 tablet 4    SITagliptan (JANUVIA) 50 MG Tab Take 1 tablet (50 mg total) by mouth once daily. 90 tablet 4    spironolactone (ALDACTONE) 25 MG tablet Take 0.5 tablets (12.5 mg total) by mouth once daily. 90 tablet 0    TRUETEST TEST STRIPS Strp 1 strip by Misc.(Non-Drug; Combo Route) route 4 (four) times daily. 400 strip 4     No current facility-administered medications for this visit.        Review of patient's " allergies indicates:   Allergen Reactions    Iodinated contrast media - oral and iv dye Rash         Review of Systems   Constitutional: Negative.  Negative for appetite change, chills, fatigue, fever and unexpected weight change.   HENT: Negative.  Negative for drooling, trouble swallowing and voice change.    Eyes: Negative.  Negative for pain and visual disturbance.   Respiratory: Negative.  Negative for shortness of breath and wheezing.    Cardiovascular: Negative.  Negative for chest pain and palpitations.   Gastrointestinal: Negative.  Negative for abdominal distention, abdominal pain, constipation and diarrhea.   Genitourinary: Negative.  Negative for difficulty urinating.   Musculoskeletal: Positive for back pain. Negative for arthralgias, gait problem, joint swelling, myalgias and neck stiffness.               Skin: Negative.  Negative for color change and rash.   Neurological: Negative.  Negative for dizziness, facial asymmetry, speech difficulty, weakness, light-headedness and numbness. Headaches:     Hematological: Negative for adenopathy.   Psychiatric/Behavioral: Negative.  Negative for behavioral problems, confusion and sleep disturbance. The patient is not nervous/anxious.            Objective:      Physical Exam      GENERAL: The patient is alert, oriented, pleasant.   HEENT; PERRLA  NECK: supple   MUSCULOSKELETAL:   Gait - slow james , on wide basis, using RW.   Cervical spine :  Minimally decreased AROM in cervical spine, flexion to 60, extension to  0,,   side bending and rotation  to 30-35 degrees without  Pain ,   No paravertebral cervical musculature tenderness    No  tenderness in upper trapezius muscles    Lumbar spine, full range of motion in all planes, flexion to 90 degrees , ext. 0.  Side bending and rotation to 35-40 degrees.   Straight leg raising Negative bilaterally.   Full range of motion in all joints x4 extremities, except in RT knee, that is very painful at pattellar lower  pole/border.  Appears high riding patella, hat is maybe  lateray displaced,  Muscle strength 5/5 throughout x4 extremities.   No  joint laxity throughout x4 extremities.   NEUROLOGIC: Cranial nerves II through XII intact.   Deep tendon reflexes is normal, +2 in the upper and lower extremities bilaterally.   Muscle tone is normal.   Sensory is intact to light touch and pinprick throughout x4 extremities.     MRI of Lumbar spine showed:  T12-L1:  There is no focal disc herniation.  No significant spinal canal narrowing.    No significant neural foraminal narrowing.  L1-2:  There is no focal disc herniation.  No significant spinal canal narrowing.    No significant neural foraminal narrowing.  L2-3:  There is no focal disc herniation.  No significant spinal canal narrowing.    No significant neural foraminal narrowing.  L3-4:  There is a minimal circumferential disk bulge and mild bilateral facet arthropathy which results in no significant spinal canal or neural foraminal narrowing.  L4-5:    There is circumferential disk bulge (eccentric to the left), moderate bilateral hypertrophic facet arthropathy, and ligamentum flavum thickening which results in mild spinal canal narrowing and no significant neural foraminal narrowing.      L5-S1:  There is circumferential disk bulge and severe hypertrophic facet arthropathy   with ligamentum flavum hypertrophy which results in mild spinal canal narrowing, moderate left neural foraminal narrowing, and mild right neural foraminal narrowing.i  Impression:   multilevel degenerative changes of the lumbar spine consisting of circumferential disk bulges, hypertrophic facet arthropathy, and ligamentum flavum hypertrophy   which result in mild spinal canal narrowing at L4-5  and L5-S1 as well as moderate left and mild right neural foraminal narrowing at L5-S1.      Assessment:       1. Spinal stenosis of lumbar region with neurogenic claudication    2. Polyneuropathy associated with  underlying disease    3. History of stroke    4. Diabetic autonomic neuropathy associated with type 2 diabetes mellitus    5. Primary osteoarthritis of right knee    6. Chronic low back pain with sciatica, sciatica laterality unspecified, unspecified back pain laterality    7. Acute pain of right knee    8. Lumbar facet arthropathy    9. PCO (posterior capsular opacification), bilateral    10. Osteoarthritis of spine with radiculopathy, cervical region    11. Radicular pain in left arm      Plan:       Spinal stenosis of lumbar region with neurogenic claudication  -     hydrocodone-acetaminophen 10-325mg (NORCO)  mg Tab; Take 1 tablet by mouth every 8 (eight) hours as needed for Pain.  Dispense: 90 tablet; Refill: 0  -     hydrocodone-acetaminophen 10-325mg (NORCO)  mg Tab; Take 1 tablet by mouth every 8 (eight) hours as needed for Pain.  Dispense: 90 tablet; Refill: 0  -     hydrocodone-acetaminophen 10-325mg (NORCO)  mg Tab; Take 1 tablet by mouth every 8 (eight) hours as needed for Pain.  Dispense: 90 tablet; Refill: 0  -     pregabalin (LYRICA) 150 MG capsule; Take 1 capsule (150 mg total) by mouth 2 (two) times daily.  Dispense: 60 capsule; Refill: 2    Polyneuropathy associated with underlying disease  -     hydrocodone-acetaminophen 10-325mg (NORCO)  mg Tab; Take 1 tablet by mouth every 8 (eight) hours as needed for Pain.  Dispense: 90 tablet; Refill: 0  -     hydrocodone-acetaminophen 10-325mg (NORCO)  mg Tab; Take 1 tablet by mouth every 8 (eight) hours as needed for Pain.  Dispense: 90 tablet; Refill: 0  -     hydrocodone-acetaminophen 10-325mg (NORCO)  mg Tab; Take 1 tablet by mouth every 8 (eight) hours as needed for Pain.  Dispense: 90 tablet; Refill: 0  -     pregabalin (LYRICA) 150 MG capsule; Take 1 capsule (150 mg total) by mouth 2 (two) times daily.  Dispense: 60 capsule; Refill: 2    History of stroke  -     pregabalin (LYRICA) 150 MG capsule; Take 1 capsule  (150 mg total) by mouth 2 (two) times daily.  Dispense: 60 capsule; Refill: 2    Diabetic autonomic neuropathy associated with type 2 diabetes mellitus  -     hydrocodone-acetaminophen 10-325mg (NORCO)  mg Tab; Take 1 tablet by mouth every 8 (eight) hours as needed for Pain.  Dispense: 90 tablet; Refill: 0  -     hydrocodone-acetaminophen 10-325mg (NORCO)  mg Tab; Take 1 tablet by mouth every 8 (eight) hours as needed for Pain.  Dispense: 90 tablet; Refill: 0  -     hydrocodone-acetaminophen 10-325mg (NORCO)  mg Tab; Take 1 tablet by mouth every 8 (eight) hours as needed for Pain.  Dispense: 90 tablet; Refill: 0  -     pregabalin (LYRICA) 150 MG capsule; Take 1 capsule (150 mg total) by mouth 2 (two) times daily.  Dispense: 60 capsule; Refill: 2    Primary osteoarthritis of right knee  -     hydrocodone-acetaminophen 10-325mg (NORCO)  mg Tab; Take 1 tablet by mouth every 8 (eight) hours as needed for Pain.  Dispense: 90 tablet; Refill: 0  -     hydrocodone-acetaminophen 10-325mg (NORCO)  mg Tab; Take 1 tablet by mouth every 8 (eight) hours as needed for Pain.  Dispense: 90 tablet; Refill: 0  -     hydrocodone-acetaminophen 10-325mg (NORCO)  mg Tab; Take 1 tablet by mouth every 8 (eight) hours as needed for Pain.  Dispense: 90 tablet; Refill: 0  -     pregabalin (LYRICA) 150 MG capsule; Take 1 capsule (150 mg total) by mouth 2 (two) times daily.  Dispense: 60 capsule; Refill: 2    Chronic low back pain with sciatica, sciatica laterality unspecified, unspecified back pain laterality  -     hydrocodone-acetaminophen 10-325mg (NORCO)  mg Tab; Take 1 tablet by mouth every 8 (eight) hours as needed for Pain.  Dispense: 90 tablet; Refill: 0  -     hydrocodone-acetaminophen 10-325mg (NORCO)  mg Tab; Take 1 tablet by mouth every 8 (eight) hours as needed for Pain.  Dispense: 90 tablet; Refill: 0  -     hydrocodone-acetaminophen 10-325mg (NORCO)  mg Tab; Take 1 tablet by  mouth every 8 (eight) hours as needed for Pain.  Dispense: 90 tablet; Refill: 0  -     pregabalin (LYRICA) 150 MG capsule; Take 1 capsule (150 mg total) by mouth 2 (two) times daily.  Dispense: 60 capsule; Refill: 2    Acute pain of right knee  -     hydrocodone-acetaminophen 10-325mg (NORCO)  mg Tab; Take 1 tablet by mouth every 8 (eight) hours as needed for Pain.  Dispense: 90 tablet; Refill: 0  -     hydrocodone-acetaminophen 10-325mg (NORCO)  mg Tab; Take 1 tablet by mouth every 8 (eight) hours as needed for Pain.  Dispense: 90 tablet; Refill: 0  -     hydrocodone-acetaminophen 10-325mg (NORCO)  mg Tab; Take 1 tablet by mouth every 8 (eight) hours as needed for Pain.  Dispense: 90 tablet; Refill: 0  -     pregabalin (LYRICA) 150 MG capsule; Take 1 capsule (150 mg total) by mouth 2 (two) times daily.  Dispense: 60 capsule; Refill: 2    Lumbar facet arthropathy  -     hydrocodone-acetaminophen 10-325mg (NORCO)  mg Tab; Take 1 tablet by mouth every 8 (eight) hours as needed for Pain.  Dispense: 90 tablet; Refill: 0  -     hydrocodone-acetaminophen 10-325mg (NORCO)  mg Tab; Take 1 tablet by mouth every 8 (eight) hours as needed for Pain.  Dispense: 90 tablet; Refill: 0  -     hydrocodone-acetaminophen 10-325mg (NORCO)  mg Tab; Take 1 tablet by mouth every 8 (eight) hours as needed for Pain.  Dispense: 90 tablet; Refill: 0  -     pregabalin (LYRICA) 150 MG capsule; Take 1 capsule (150 mg total) by mouth 2 (two) times daily.  Dispense: 60 capsule; Refill: 2    PCO (posterior capsular opacification), bilateral  -     hydrocodone-acetaminophen 10-325mg (NORCO)  mg Tab; Take 1 tablet by mouth every 8 (eight) hours as needed for Pain.  Dispense: 90 tablet; Refill: 0  -     hydrocodone-acetaminophen 10-325mg (NORCO)  mg Tab; Take 1 tablet by mouth every 8 (eight) hours as needed for Pain.  Dispense: 90 tablet; Refill: 0  -     hydrocodone-acetaminophen 10-325mg (NORCO)   mg Tab; Take 1 tablet by mouth every 8 (eight) hours as needed for Pain.  Dispense: 90 tablet; Refill: 0  -     pregabalin (LYRICA) 150 MG capsule; Take 1 capsule (150 mg total) by mouth 2 (two) times daily.  Dispense: 60 capsule; Refill: 2    Osteoarthritis of spine with radiculopathy, cervical region  -     hydrocodone-acetaminophen 10-325mg (NORCO)  mg Tab; Take 1 tablet by mouth every 8 (eight) hours as needed for Pain.  Dispense: 90 tablet; Refill: 0  -     hydrocodone-acetaminophen 10-325mg (NORCO)  mg Tab; Take 1 tablet by mouth every 8 (eight) hours as needed for Pain.  Dispense: 90 tablet; Refill: 0  -     hydrocodone-acetaminophen 10-325mg (NORCO)  mg Tab; Take 1 tablet by mouth every 8 (eight) hours as needed for Pain.  Dispense: 90 tablet; Refill: 0  -     pregabalin (LYRICA) 150 MG capsule; Take 1 capsule (150 mg total) by mouth 2 (two) times daily.  Dispense: 60 capsule; Refill: 2    Radicular pain in left arm  -     hydrocodone-acetaminophen 10-325mg (NORCO)  mg Tab; Take 1 tablet by mouth every 8 (eight) hours as needed for Pain.  Dispense: 90 tablet; Refill: 0  -     hydrocodone-acetaminophen 10-325mg (NORCO)  mg Tab; Take 1 tablet by mouth every 8 (eight) hours as needed for Pain.  Dispense: 90 tablet; Refill: 0  -     hydrocodone-acetaminophen 10-325mg (NORCO)  mg Tab; Take 1 tablet by mouth every 8 (eight) hours as needed for Pain.  Dispense: 90 tablet; Refill: 0  -     pregabalin (LYRICA) 150 MG capsule; Take 1 capsule (150 mg total) by mouth 2 (two) times daily.  Dispense: 60 capsule; Refill: 2      Patient with chronic low back pain, secondary to lumbar spondylosis, possible radiculopathy, cx with morbid obesity.   Also with gait instability sec. To sever DJD of Rt knee.    1. Will increase  Hydrocodone to 10/325 mg , and Gabapentin is changed to  Lyrica ,   That is helping, will increase to 150 mg BID ( approved by insurance, and pain in hands is gone),    Add Cymbalta.  2. Will resume  PT, external referral given closer to home.  3. Rt knee pain , xray of knee showed severe DJD.  Knee brace for stability,and proprioception.  Seen by Betsey Paige PA ,and dr. [facility], who recommended no surgery in this high risk pt, weight loss in recommended before re evaluation.    RTC in 3 months.     Total time spent face to face with patient was 25 minutes.   More than 50% of that time was spent in counseling on diagnosis , prognosis and treatment options.   I also caunsel patient  on common and most usual side effect of prescribed medications.   Risk and benefits of opiates, possible risk of developing opiate dependence and tolerance, need of strict compliance with prescribed medications.  I reviewed Primary care , and other specialty's notes to better coordinate patient's  care.   All questions were answered, and patient voiced understanding.                                                                                                                                                                          .

## 2017-11-27 DIAGNOSIS — M48.061 LUMBAR SPINAL STENOSIS: ICD-10-CM

## 2017-11-27 DIAGNOSIS — H26.493 PCO (POSTERIOR CAPSULAR OPACIFICATION), BILATERAL: ICD-10-CM

## 2017-11-27 DIAGNOSIS — M17.11 PRIMARY OSTEOARTHRITIS OF RIGHT KNEE: ICD-10-CM

## 2017-11-27 DIAGNOSIS — M54.40 CHRONIC LOW BACK PAIN WITH SCIATICA, SCIATICA LATERALITY UNSPECIFIED, UNSPECIFIED BACK PAIN LATERALITY: ICD-10-CM

## 2017-11-27 DIAGNOSIS — M25.561 ACUTE PAIN OF RIGHT KNEE: ICD-10-CM

## 2017-11-27 DIAGNOSIS — E11.43 DIABETIC AUTONOMIC NEUROPATHY ASSOCIATED WITH TYPE 2 DIABETES MELLITUS: ICD-10-CM

## 2017-11-27 DIAGNOSIS — G89.29 CHRONIC LOW BACK PAIN WITH SCIATICA, SCIATICA LATERALITY UNSPECIFIED, UNSPECIFIED BACK PAIN LATERALITY: ICD-10-CM

## 2017-11-27 DIAGNOSIS — M47.816 LUMBAR FACET ARTHROPATHY: ICD-10-CM

## 2017-11-27 RX ORDER — DULOXETIN HYDROCHLORIDE 30 MG/1
CAPSULE, DELAYED RELEASE ORAL
Qty: 90 CAPSULE | Refills: 3 | Status: SHIPPED | OUTPATIENT
Start: 2017-11-27 | End: 2018-02-02 | Stop reason: SDUPTHER

## 2017-11-30 RX ORDER — ALLOPURINOL 100 MG/1
TABLET ORAL
Qty: 30 TABLET | Refills: 0 | Status: SHIPPED | OUTPATIENT
Start: 2017-11-30 | End: 2018-03-13 | Stop reason: SDUPTHER

## 2017-12-04 ENCOUNTER — OFFICE VISIT (OUTPATIENT)
Dept: ORTHOPEDICS | Facility: CLINIC | Age: 67
End: 2017-12-04
Payer: MEDICARE

## 2017-12-04 ENCOUNTER — HOSPITAL ENCOUNTER (OUTPATIENT)
Dept: RADIOLOGY | Facility: HOSPITAL | Age: 67
Discharge: HOME | End: 2017-12-04
Attending: PHYSICIAN ASSISTANT
Payer: MEDICARE

## 2017-12-04 VITALS
DIASTOLIC BLOOD PRESSURE: 81 MMHG | HEIGHT: 65 IN | WEIGHT: 293 LBS | HEART RATE: 68 BPM | SYSTOLIC BLOOD PRESSURE: 109 MMHG | BODY MASS INDEX: 48.82 KG/M2

## 2017-12-04 DIAGNOSIS — R52 PAIN: Primary | ICD-10-CM

## 2017-12-04 DIAGNOSIS — Z96.652 STATUS POST TOTAL LEFT KNEE REPLACEMENT: ICD-10-CM

## 2017-12-04 DIAGNOSIS — M17.11 PRIMARY OSTEOARTHRITIS OF RIGHT KNEE: ICD-10-CM

## 2017-12-04 DIAGNOSIS — R52 PAIN: ICD-10-CM

## 2017-12-04 PROCEDURE — 20610 DRAIN/INJ JOINT/BURSA W/O US: CPT | Mod: S$PBB,RT,, | Performed by: PHYSICIAN ASSISTANT

## 2017-12-04 PROCEDURE — 73562 X-RAY EXAM OF KNEE 3: CPT | Mod: 26,50,, | Performed by: RADIOLOGY

## 2017-12-04 PROCEDURE — 73562 X-RAY EXAM OF KNEE 3: CPT | Mod: TC,50

## 2017-12-04 PROCEDURE — 20610 DRAIN/INJ JOINT/BURSA W/O US: CPT | Mod: PBBFAC | Performed by: PHYSICIAN ASSISTANT

## 2017-12-04 PROCEDURE — 99213 OFFICE O/P EST LOW 20 MIN: CPT | Mod: 25,S$PBB,, | Performed by: PHYSICIAN ASSISTANT

## 2017-12-04 PROCEDURE — 99999 PR PBB SHADOW E&M-EST. PATIENT-LVL III: CPT | Mod: PBBFAC,,, | Performed by: PHYSICIAN ASSISTANT

## 2017-12-04 PROCEDURE — 99213 OFFICE O/P EST LOW 20 MIN: CPT | Mod: PBBFAC,25 | Performed by: PHYSICIAN ASSISTANT

## 2017-12-04 RX ORDER — BETAMETHASONE SODIUM PHOSPHATE AND BETAMETHASONE ACETATE 3; 3 MG/ML; MG/ML
6 INJECTION, SUSPENSION INTRA-ARTICULAR; INTRALESIONAL; INTRAMUSCULAR; SOFT TISSUE
Status: COMPLETED | OUTPATIENT
Start: 2017-12-04 | End: 2017-12-04

## 2017-12-04 RX ADMIN — BETAMETHASONE ACETATE AND BETAMETHASONE SODIUM PHOSPHATE 6 MG: 3; 3 INJECTION, SUSPENSION INTRA-ARTICULAR; INTRALESIONAL; INTRAMUSCULAR; SOFT TISSUE at 09:12

## 2017-12-04 NOTE — PROGRESS NOTES
SUBJECTIVE:     Chief Complaint & History of Present Illness:  Annika Mac is a Established patient 66 y.o. female who is seen here today with a complaint of    Chief Complaint   Patient presents with    Right Knee - Pain    .  She is patient well known to me was last seen treated clinic for this condition 11/25/2016 at which time she undergone a therapeutic diagnostic cortisone injection of the right knee.  She is status post left TKA by Dr. [facility] 2002.  She has progressively worsening pain in the right knee and is aware that she is in need of knee replacement surgery but is having difficulty with diabetic glucose control and a weight gain and she was seen by a pain management approximate 1 month ago and is in the process of being fitted for custom OA brace to help with her strength and range of motion of the right shoulder with the hopes of increasing her activity levels  On a scale of 1-10, with 10 being worst pain imaginable, he rates this pain as 5 on good days and 8 on bad days.  she describes the pain as sore and grinding.    Review of patient's allergies indicates:   Allergen Reactions    Iodinated contrast- oral and iv dye Rash         Current Outpatient Prescriptions   Medication Sig Dispense Refill    albuterol 90 mcg/actuation inhaler Inhale 2 puffs into the lungs every 6 (six) hours as needed for Wheezing. 1 Inhaler 0    allopurinol (ZYLOPRIM) 100 MG tablet TAKE 1 TABLET BY MOUTH ONCE DAILY 30 tablet 0    amLODIPine (NORVASC) 5 MG tablet Take 1 tablet (5 mg total) by mouth once daily. 90 tablet 1    aspirin 81 MG Chew Take 1 tablet (81 mg total) by mouth once daily.  0    azelastine (ASTELIN) 137 mcg (0.1 %) nasal spray 1 spray (137 mcg total) by Nasal route 2 (two) times daily. 30 mL 11    blood-glucose meter kit Use as instructed, true test/result meter 1 each 0    colchicine 0.6 mg tablet Take 1 tablet (0.6 mg total) by mouth once daily. 30 tablet 3    DULoxetine (CYMBALTA) 30 MG  "capsule TAKE 1 CAPSULE BY MOUTH  ONCE DAILY . 90 capsule 3    ferrous sulfate 325 mg (65 mg iron) Tab tablet Take 1 tablet (325 mg total) by mouth 2 (two) times daily.  0    fexofenadine (ALLEGRA) 180 MG tablet TAKE 1 TABLET BY MOUTH ONCE DAILY 30 tablet 0    fluticasone (FLONASE) 50 mcg/actuation nasal spray 2 sprays by Each Nare route once daily. 48 g 6    furosemide (LASIX) 20 MG tablet Take 1 tablet by mouth once a day 90 tablet 3    hydrocodone-acetaminophen 10-325mg (NORCO)  mg Tab Take 1 tablet by mouth every 8 (eight) hours as needed for Pain. 90 tablet 0    [START ON 12/21/2017] hydrocodone-acetaminophen 10-325mg (NORCO)  mg Tab Take 1 tablet by mouth every 8 (eight) hours as needed for Pain. 90 tablet 0    [START ON 1/21/2018] hydrocodone-acetaminophen 10-325mg (NORCO)  mg Tab Take 1 tablet by mouth every 8 (eight) hours as needed for Pain. 90 tablet 0    insulin regular hum U-500 conc (HUMULIN R U-500, CONC, KWIKPEN) 500 unit/mL (3 mL) InPn Inject 15-95 Units into the skin 3 (three) times daily before meals. 11 Syringe 1    irbesartan (AVAPRO) 300 MG tablet Take 0.5 tablets (150 mg total) by mouth every evening. 90 tablet 2    labetalol (NORMODYNE) 300 MG tablet TAKE 1 TABLET BY MOUTH TWO  TIMES DAILY 120 tablet 3    lancets 31 gauge Misc 1 lancet by Misc.(Non-Drug; Combo Route) route 4 (four) times daily. 150 each 6    levocetirizine (XYZAL) 5 MG tablet Take 1 tablet (5 mg total) by mouth every evening. 7 tablet 0    montelukast (SINGULAIR) 10 mg tablet TAKE 1 TABLET(10 MG) BY MOUTH EVERY EVENING 90 tablet 0    pantoprazole (PROTONIX) 40 MG tablet Take 1 tablet (40 mg total) by mouth once daily. 90 tablet 3    pen needle, diabetic 32 gauge x 5/32" Ndle Use with multiple daily insulin injections 100 each 12    pregabalin (LYRICA) 150 MG capsule Take 1 capsule (150 mg total) by mouth 2 (two) times daily. 60 capsule 2    simvastatin (ZOCOR) 40 MG tablet Take 0.5 tablets " (20 mg total) by mouth every evening. 90 tablet 4    SITagliptan (JANUVIA) 50 MG Tab Take 1 tablet (50 mg total) by mouth once daily. 90 tablet 4    spironolactone (ALDACTONE) 25 MG tablet Take 0.5 tablets (12.5 mg total) by mouth once daily. 90 tablet 0    TRUETEST TEST STRIPS Strp 1 strip by Misc.(Non-Drug; Combo Route) route 4 (four) times daily. 400 strip 4    nystatin (MYCOSTATIN) powder Apply topically 3 (three) times daily. 60 g 2     Current Facility-Administered Medications   Medication Dose Route Frequency Provider Last Rate Last Dose    betamethasone acetate-betamethasone sodium phosphate injection 6 mg  6 mg Intra-articular 1 time in Clinic/HOD Homero Leggett PA-C           Past Medical History:   Diagnosis Date    Allergy     Anemia 2012    Arthritis     CHF (congestive heart failure)     CKD (chronic kidney disease) stage 3, GFR 30-59 ml/min 2012    Clotting disorder     Deep vein thrombophlebitis of left leg 2012    Degenerative disc disease     Diabetic peripheral neuropathy associated with type 2 diabetes mellitus 2012    GERD (gastroesophageal reflux disease)     HTN (hypertension) 2012    Hyperlipidemia 2012    Lead-induced gout of ankle or foot     right going on three weeks    Nonproliferative diabetic retinopathy of right eye 2012    Obstructive sleep apnea 2012    Osteoporosis     Proliferative diabetic retinopathy of left eye 2012    Stroke 2003    Type 2 diabetes mellitus with diabetic polyneuropathy 2012    Ulcer        Past Surgical History:   Procedure Laterality Date    CATARACT EXTRACTION W/  INTRAOCULAR LENS IMPLANT Left 3/2002    left     CATARACT EXTRACTION W/  INTRAOCULAR LENS IMPLANT Right     OD dr. olivares     SECTION      CYST REMOVAL  2011    left side of face    EYE SURGERY Bilateral 2012    eye implants    GANGLION CYST EXCISION  2007    Right wrist     "Pan Retinal Photocoagulation Bilateral     Dr. Monterroso (Proliferative Diabetic Retinopathy)    TOTAL ABDOMINAL HYSTERECTOMY  1982    TOTAL KNEE ARTHROPLASTY  2/2006    left    TRIGGER FINGER RELEASE  9/18/2009       Vital Signs (Most Recent)  Vitals:    12/04/17 0817   BP: 109/81   Pulse: 68           Review of Systems:  ROS:  Constitutional: no fever or chills  Eyes: no visual changes  ENT: no nasal congestion or sore throat  Respiratory: no cough or shortness of breath  Cardiovascular: no chest pain or palpitations, CHF, CAD, stroke  Gastrointestinal: no nausea or vomiting, tolerating diet, GERD  Genitourinary: no hematuria or dysuria, chronic kidney disease stage III  Integument/Breast: no rash or pruritis  Hematologic/Lymphatic: no easy bruising or lymphadenopathy  Musculoskeletal: positive for arthralgias, back pain, myalgias, neck pain, stiff joints and gouty arthritis, negative for muscle weakness  Neurological: no seizures or tremors  Behavioral/Psych: no auditory or visual hallucinations  Endocrine: no heat or cold intolerance, diabetes type 2 with neuropathy              OBJECTIVE:     PHYSICAL EXAM:  Height: 5' 5" (165.1 cm) Weight: (!) 142.9 kg (315 lb 0.6 oz), General Appearance: Well nourished, well developed, in no acute distress.  Neurological: Mood & affect are normal.  right  Knee Exam:  Knee Range of Motion:0-100 degrees flexion   Effusion:none  Condition of skin:intact  Location of tenderness:Medial joint line   Strength:limited by pain and 5 of 5  Stability:  Lachman: stable, LCL: stable, MCL: stable, PCL: stable and posteromedial (dial): stable  Varus /Valgus stress:  normal  Hans:   negative/negative    left  Knee Exam:  Knee Range of Motion:0-115 degrees flexion   Effusion:none  Condition of skin:intact  Location of tenderness:None   Strength:5 of 5  Stability:  stable to testing  Varus /Valgus stress:  normal  Hans:   negative/negative      Hip " Examination:  normal    RADIOGRAPHS:  X-rays taken today films reviewed by me demonstrate complete loss of medial joint space of the right knee with marked osteophytic spurring and sclerotic changes tricompartmentally.  Left knee demonstrates well fixed well aligned total knee prosthesis in good alignment without evidence of loosening wear dislocation or other bony abnormalities    ASSESSMENT/PLAN:     Plan: We discussed with the patient at length all the different treatment options available for  the knee including anti-inflammatories, acetaminophen, rest, ice, knee strengthening exercise, occasional cortisone injections for temporary relief, Viscosupplimentation injections, arthroscopic debridement osteotomy, and finally knee arthroplasty.   We'll proceed with a cortisone injection of the right knee     The injection site was identified and the skin was prepared with a betadine solution. The   right  knee was injected with 1 ml of Celestone and 5 ml Lidocaine under sterile technique. Annika Mac tolerated the procedure well, she was advised to rest the knee today, ice and elevation. she did receive immediate relief of the pain in and about his knee she was told this would be short lived and is secondary to the lidocaine. she may have an increase in his discomfort tonight followed by steady improvement over the next several days. I may take 1-3 weeks following the injection to get the full benefit of the medication.  I will see her back in 3-6 months. Sooner if he has any problems or concerns.    Annika Mac was advised to monitor her blood sugars closely over the next several days. The steroid may cause a rise in them. If her glucose levels rise to a point she is uncomfortable or he is unable to control them is is to contact his PCP or go immediately to the emergency department.

## 2017-12-04 NOTE — LETTER
December 4, 2017      Micaela Barrientos MD  [address]  [facility] [address]           [facility]  [address]  [facility] [address]  Phone: [phone]          Patient: Annika Mac   MR Number: [id number]   YOB: 1950   Date of Visit: 12/4/2017       Dear Dr. Micaela Barrientos:    Thank you for referring Annika Mac to me for evaluation. Attached you will find relevant portions of my assessment and plan of care.    If you have questions, please do not hesitate to call me. I look forward to following Annika Mac along with you.    Sincerely,    Homero Leggett PA-C    Enclosure  CC:  No Recipients    If you would like to receive this communication electronically, please contact [email or web address] or [phone] to request more information on [employer] Link access.    For providers and/or their staff who would like to refer a patient to [facility], please contact us through our one-stop-shop provider referral line, [facility] , at [phone].    If you feel you have received this communication in error or would no longer like to receive these types of communications, please e-mail [email or web address]

## 2017-12-15 ENCOUNTER — TELEPHONE (OUTPATIENT)
Dept: ENDOCRINOLOGY | Facility: CLINIC | Age: 67
End: 2017-12-15

## 2017-12-15 NOTE — TELEPHONE ENCOUNTER
BG logs reviewed.   Fasting 190-240s  TUCKER 100-250s. Most 100s  DIN 150s-210  HS 190s- 260s  Few higher 100s.  No hypoglycemia.     Please call pt.  I have reviewed her BG logs and am happy with her progress.   Please have her make the following insulin dose adjustments:   Increase breakfast dose to 100 units for a regular breakfast, continue 90 units for a smaller breakfast.   No changes to lunch dose. Continue lunch dose 65 units for a regular lunch, continue to take 70 units for a bigger lunch.    Continue dinner dose of 30 units for a regular dinner, but increase to 20 units a smaller dinner.    No changes to Januvia.

## 2017-12-18 NOTE — TELEPHONE ENCOUNTER
Left a message stating that Louise review patient Blood sugar reading and she have some changes.     Increase breakfast dose to 100 units for a regular breakfast, continue 90 units for a smaller breakfast.              No changes to lunch dose. Continue lunch dose 65 units for a regular lunch, continue to take 70 units for a bigger lunch.               Continue dinner dose of 30 units for a regular dinner, but increase to 20 units a smaller dinner.               No changes to Januvia.

## 2017-12-20 ENCOUNTER — LAB VISIT (OUTPATIENT)
Dept: LAB | Facility: HOSPITAL | Age: 67
End: 2017-12-20
Attending: INTERNAL MEDICINE
Payer: MEDICARE

## 2017-12-20 DIAGNOSIS — E87.5 HYPERKALEMIA: ICD-10-CM

## 2017-12-20 LAB
ALBUMIN SERPL BCP-MCNC: 3.3 G/DL
ANION GAP SERPL CALC-SCNC: 7 MMOL/L
BUN SERPL-MCNC: 25 MG/DL
CALCIUM SERPL-MCNC: 10.1 MG/DL
CHLORIDE SERPL-SCNC: 106 MMOL/L
CO2 SERPL-SCNC: 25 MMOL/L
CREAT SERPL-MCNC: 1.8 MG/DL
EST. GFR  (AFRICAN AMERICAN): 32.9 ML/MIN/1.73 M^2
EST. GFR  (NON AFRICAN AMERICAN): 28.5 ML/MIN/1.73 M^2
GLUCOSE SERPL-MCNC: 211 MG/DL
PHOSPHATE SERPL-MCNC: 3.3 MG/DL
POTASSIUM SERPL-SCNC: 5 MMOL/L
SODIUM SERPL-SCNC: 138 MMOL/L

## 2017-12-20 PROCEDURE — 36415 COLL VENOUS BLD VENIPUNCTURE: CPT

## 2017-12-20 PROCEDURE — 80069 RENAL FUNCTION PANEL: CPT

## 2017-12-25 DIAGNOSIS — M48.061 LUMBAR SPINAL STENOSIS: ICD-10-CM

## 2017-12-25 DIAGNOSIS — M17.11 PRIMARY OSTEOARTHRITIS OF RIGHT KNEE: ICD-10-CM

## 2017-12-25 DIAGNOSIS — M54.40 CHRONIC LOW BACK PAIN WITH SCIATICA, SCIATICA LATERALITY UNSPECIFIED, UNSPECIFIED BACK PAIN LATERALITY: ICD-10-CM

## 2017-12-25 DIAGNOSIS — M25.561 ACUTE PAIN OF RIGHT KNEE: ICD-10-CM

## 2017-12-25 DIAGNOSIS — E11.43 DIABETIC AUTONOMIC NEUROPATHY ASSOCIATED WITH TYPE 2 DIABETES MELLITUS: ICD-10-CM

## 2017-12-25 DIAGNOSIS — G89.29 CHRONIC LOW BACK PAIN WITH SCIATICA, SCIATICA LATERALITY UNSPECIFIED, UNSPECIFIED BACK PAIN LATERALITY: ICD-10-CM

## 2017-12-25 DIAGNOSIS — H26.493 PCO (POSTERIOR CAPSULAR OPACIFICATION), BILATERAL: ICD-10-CM

## 2017-12-25 DIAGNOSIS — M47.816 LUMBAR FACET ARTHROPATHY: ICD-10-CM

## 2017-12-25 RX ORDER — PREGABALIN 100 MG/1
CAPSULE ORAL
Qty: 60 CAPSULE | Refills: 2 | Status: SHIPPED | OUTPATIENT
Start: 2017-12-25 | End: 2018-02-02 | Stop reason: SDUPTHER

## 2018-01-12 ENCOUNTER — LAB VISIT (OUTPATIENT)
Dept: LAB | Facility: HOSPITAL | Age: 68
End: 2018-01-12
Attending: NURSE PRACTITIONER
Payer: MEDICARE

## 2018-01-12 DIAGNOSIS — E11.42 TYPE 2 DIABETES MELLITUS WITH DIABETIC POLYNEUROPATHY, WITH LONG-TERM CURRENT USE OF INSULIN: ICD-10-CM

## 2018-01-12 DIAGNOSIS — Z79.4 TYPE 2 DIABETES MELLITUS WITH DIABETIC POLYNEUROPATHY, WITH LONG-TERM CURRENT USE OF INSULIN: ICD-10-CM

## 2018-01-12 LAB
ESTIMATED AVG GLUCOSE: 252 MG/DL
HBA1C MFR BLD HPLC: 10.4 %

## 2018-01-12 PROCEDURE — 83036 HEMOGLOBIN GLYCOSYLATED A1C: CPT

## 2018-01-12 PROCEDURE — 36415 COLL VENOUS BLD VENIPUNCTURE: CPT | Mod: PO

## 2018-01-16 NOTE — PROGRESS NOTES
"CC: This 67 y.o. female presents for management of Diabetes Mellitus     Previous patient of DIXIE Schaefer NP. Last seen in 10/2017. This is her first visit with me.     HPI: Diagnosed with T2DM in July 1987 when c/o polydipsia and polyuria. Started oral agents then NPH and Regular insulin. Converted to MDI with Lantus and Novolog in 2/2006 after knee surgery. D/c TZD r/t weight gain 7/08 and added Symlin. Stopped Symlin 2/09. Januvia added 2/10. D/C Januvia 12/10 r/t cost; resumed though pt assistance in 2016. Converted to U500 in 7/10.   Received DM Education 1/22/08.   11/2013 she experienced severe hypoglycemia at home,  called 911, "nonresponsive" so brought to local ED for hypoglycemia BG 30s in 11/2013.     Medical conditions also include obesity, RUFINA, diastolic dysfunction, h/o stroke, PAD, and CKD 3.  Follows with cardiology, vascular medicine, and nephrology.   Also has chronic back pain, which limits activity and contributes to wt gain. Has followed with physical medicine.   Has chronic cough 2/2 GERD.   DM complicated with PDR with ME, PN.    She is again on U500 as Overall BGs markedly elevated.    Readings Check 3-4x daily.  AM: 175-350, 444  Lunch: 100-270, 395, 407  Dinner: 102-340, 440  HS: 180-300, 494            Basal only trial per PCP and MDI trial unsuccessful with marked elevation, likely r/t volume/absorption.     Today she reports she feels better, no longer feels tired.  One episode of hypoglycemia. Ate a small dinner and gave herself 30 units of U500. Corrected.   Can recognize and treat symptoms.     DM ed reports to have good knowledge of CHO portions.     CURRENT DM MEDS: Januvia 50mg QD.  Humulin U500 pen: breakfast dose 100U- 90 U for smaller breakfast, lunch dose 65U/ for bigger lunch increase to 70U; dinner dose 30 units/ for smaller dinner, decrease dose to 20 units.     Denies missed doses.  Has Medicare Extra Help    Eats 3 meals daily, + snacks  Drinks water, unsweet tea. " "  No formal exercise.     Standards of Care:  Eye exam: 10/16  Podiatry: 8/2017    ROS:   Gen: Appetite good, denies fatigue and weakness.  Skin: No rashes, no hair changes.  Eyes: Denies visual disturbances  Resp: no SOB or MCFADDEN, no cough today  Cardiac: No palpitations, chest pain, no edema   GI: No nausea or vomiting, diarrhea, constipation, or abdominal pain.  MS/Neuro: Denies numbness/ tingling in BLE; Gait steady, speech clear  Chronic back pain.   Psych: Denies drug/ETOH abuse, no hx of depression.  Other systems: negative.    PE: /64   Pulse 78   Ht 5' 5" (1.651 m)   Wt (!) 137.4 kg (302 lb 14.6 oz)   BMI 50.41 kg/m²     GENERAL: Well developed, well nourished.  PSYCH: AAOx3, appropriate mood and affect, pleasant expression, conversant, appears relaxed, well groomed.   EYES: Conjunctiva, corneas clear  NECK: Supple, trachea midline.  CHEST: Resp even and unlabored  CARDIAC: RRR, +2/6 murmur lower left sternal border  ABDOMEN: Soft, non-tender, non-distended; +BSs x4  VASCULAR: DP pulses +2/4 bilaterally, mild edema.  NEURO: Gait steady  SKIN: Skin warm and dry, + acanthosis nigracans.  FEET:  Feet no cuts or scratches  Shoes appropriate  sensation intact to vibration and monofilament  Injection sites are ok. No lipo hypertropthy or atrophy.     Hemoglobin A1C   Date Value Ref Range Status   01/12/2018 10.4 (H) 4.0 - 5.6 % Final     Comment:     According to ADA guidelines, hemoglobin A1c <7.0% represents  optimal control in non-pregnant diabetic patients. Different  metrics may apply to specific patient populations.   Standards of Medical Care in Diabetes-2016.  For the purpose of screening for the presence of diabetes:  <5.7%     Consistent with the absence of diabetes  5.7-6.4%  Consistent with increasing risk for diabetes   (prediabetes)  >or=6.5%  Consistent with diabetes  Currently, no consensus exists for use of hemoglobin A1c  for diagnosis of diabetes for children.  This Hemoglobin A1c assay " has significant interference with fetal   hemoglobin   (HbF). The results are invalid for patients with abnormal amounts of   HbF,   including those with known Hereditary Persistence   of Fetal Hemoglobin. Heterozygous hemoglobin variants (HbAS, HbAC,   HbAD, HbAE, HbA2) do not significantly interfere with this assay;   however, presence of multiple variants in a sample may impact the %   interference.     10/06/2017 9.4 (H) 4.0 - 5.6 % Final     Comment:     According to ADA guidelines, hemoglobin A1c <7.0% represents  optimal control in non-pregnant diabetic patients. Different  metrics may apply to specific patient populations.   Standards of Medical Care in Diabetes-2016.  For the purpose of screening for the presence of diabetes:  <5.7%     Consistent with the absence of diabetes  5.7-6.4%  Consistent with increasing risk for diabetes   (prediabetes)  >or=6.5%  Consistent with diabetes  Currently, no consensus exists for use of hemoglobin A1c  for diagnosis of diabetes for children.  This Hemoglobin A1c assay has significant interference with fetal   hemoglobin   (HbF). The results are invalid for patients with abnormal amounts of   HbF,   including those with known Hereditary Persistence   of Fetal Hemoglobin. Heterozygous hemoglobin variants (HbAS, HbAC,   HbAD, HbAE, HbA2) do not significantly interfere with this assay;   however, presence of multiple variants in a sample may impact the %   interference.     06/30/2017 9.8 (H) 4.0 - 5.6 % Final     Comment:     According to ADA guidelines, hemoglobin A1c <7.0% represents  optimal control in non-pregnant diabetic patients. Different  metrics may apply to specific patient populations.   Standards of Medical Care in Diabetes-2016.  For the purpose of screening for the presence of diabetes:  <5.7%     Consistent with the absence of diabetes  5.7-6.4%  Consistent with increasing risk for diabetes   (prediabetes)  >or=6.5%  Consistent with diabetes  Currently, no  consensus exists for use of hemoglobin A1c  for diagnosis of diabetes for children.  This Hemoglobin A1c assay has significant interference with fetal   hemoglobin   (HbF). The results are invalid for patients with abnormal amounts of   HbF,   including those with known Hereditary Persistence   of Fetal Hemoglobin. Heterozygous hemoglobin variants (HbAS, HbAC,   HbAD, HbAE, HbA2) do not significantly interfere with this assay;   however, presence of multiple variants in a sample may impact the %   interference.       ASSESSMENT and PLAN:  1. Type 2 diabetes mellitus with diabetic polyneuropathy, with long-term current use of insulin     2. Uncontrolled type 2 diabetes mellitus with both eyes affected by proliferative retinopathy without macular edema, with long-term current use of insulin     3. CKD (chronic kidney disease) stage 3, GFR 30-59 ml/min     4. Polyneuropathy associated with underlying disease     5. Dyslipidemia     6. Essential hypertension     7. Morbid obesity due to excess calories       1T2DM: uncontrolled, with neuropathy, retinopathy, nephropathy.   - A1c Increased since last visit: 10.4%   - Humulin U500:  Increase dose by 10%- breakfast dose to 105 units for a regular breakfast, but take 100 units for a smaller breakfast.  Increase lunch dose 75 units for a regular lunch, but take 70 units for a bigger lunch.   Increase dinner dose to 40 units for a regular dinner, but take 30 units for a smaller dinner.   - Continue Januvia 50mg QAM. Renal dose GFR 32.9.    - Consider CGMS testing moving forward. Will discuss at next visit.     - BG AC/HS. Send logs in 2 weeks. Call sooner for hypoglycemia.     Hypoglycemia management reviewed. Carry glucose.     Discussed A1c goals.     - takes ASA, ARB, statin  - Urine at next visit.   - Due for eye appointment- patient stated that she will call.      2. PDR with macular edema - optimize BG. Continue follow with Ophth.       3. CKD stage III - GFR 32.9, on  ACEi therapy. No metformin or SLGT2i. Saw nephrology 11/2017.     4. Peripheral neuropathy - on Lyrica and Cymbalta with relief.      5. HLP - Controlled. Continue statin.      6. Morbid obesity   Increases insulin resistance. Chronic pain limits physical activity.  Discussed medical wt loss. She is not interested in wt management at this time.     7. HTN - Controlled. On ARB.      Follow-up in about 2 months (around 3/19/2018).    Case discussed with Dr. Pat   Recommendations were discussed with the patient in detail  The patient verbalized understanding and agrees with the plan outlined as above.

## 2018-01-18 DIAGNOSIS — N18.30 CKD (CHRONIC KIDNEY DISEASE) STAGE 3, GFR 30-59 ML/MIN: Chronic | ICD-10-CM

## 2018-01-18 DIAGNOSIS — I10 ESSENTIAL HYPERTENSION: ICD-10-CM

## 2018-01-18 DIAGNOSIS — T78.40XD ALLERGY, SUBSEQUENT ENCOUNTER: ICD-10-CM

## 2018-01-18 RX ORDER — ALLOPURINOL 100 MG/1
100 TABLET ORAL DAILY
Qty: 30 TABLET | Refills: 0 | OUTPATIENT
Start: 2018-01-18

## 2018-01-18 RX ORDER — SPIRONOLACTONE 25 MG/1
12.5 TABLET ORAL DAILY
Qty: 90 TABLET | Refills: 0 | OUTPATIENT
Start: 2018-01-18

## 2018-01-18 RX ORDER — COLCHICINE 0.6 MG/1
0.6 TABLET ORAL DAILY
Qty: 30 TABLET | Refills: 3 | OUTPATIENT
Start: 2018-01-18

## 2018-01-18 RX ORDER — LABETALOL 300 MG/1
300 TABLET, FILM COATED ORAL 2 TIMES DAILY
Qty: 120 TABLET | Refills: 3 | Status: SHIPPED | OUTPATIENT
Start: 2018-01-18 | End: 2018-02-02 | Stop reason: SDUPTHER

## 2018-01-18 RX ORDER — IRBESARTAN 300 MG/1
150 TABLET ORAL NIGHTLY
Qty: 90 TABLET | Refills: 3 | Status: SHIPPED | OUTPATIENT
Start: 2018-01-18 | End: 2018-02-02 | Stop reason: SDUPTHER

## 2018-01-18 RX ORDER — FUROSEMIDE 20 MG/1
20 TABLET ORAL DAILY
Qty: 90 TABLET | Refills: 3 | Status: SHIPPED | OUTPATIENT
Start: 2018-01-18 | End: 2018-02-02 | Stop reason: SDUPTHER

## 2018-01-18 RX ORDER — MINERAL OIL
180 ENEMA (ML) RECTAL DAILY
Qty: 30 TABLET | Refills: 0 | OUTPATIENT
Start: 2018-01-18

## 2018-01-19 ENCOUNTER — TELEPHONE (OUTPATIENT)
Dept: ENDOSCOPY | Facility: HOSPITAL | Age: 68
End: 2018-01-19

## 2018-01-19 ENCOUNTER — OFFICE VISIT (OUTPATIENT)
Dept: GASTROENTEROLOGY | Facility: CLINIC | Age: 68
End: 2018-01-19
Payer: MEDICARE

## 2018-01-19 ENCOUNTER — OFFICE VISIT (OUTPATIENT)
Dept: ENDOCRINOLOGY | Facility: CLINIC | Age: 68
End: 2018-01-19
Payer: MEDICARE

## 2018-01-19 ENCOUNTER — TELEPHONE (OUTPATIENT)
Dept: GASTROENTEROLOGY | Facility: CLINIC | Age: 68
End: 2018-01-19

## 2018-01-19 VITALS
DIASTOLIC BLOOD PRESSURE: 59 MMHG | BODY MASS INDEX: 48.82 KG/M2 | HEIGHT: 65 IN | SYSTOLIC BLOOD PRESSURE: 123 MMHG | WEIGHT: 293 LBS | HEART RATE: 79 BPM

## 2018-01-19 VITALS
SYSTOLIC BLOOD PRESSURE: 112 MMHG | HEART RATE: 78 BPM | DIASTOLIC BLOOD PRESSURE: 64 MMHG | HEIGHT: 65 IN | BODY MASS INDEX: 48.82 KG/M2 | WEIGHT: 293 LBS

## 2018-01-19 DIAGNOSIS — I10 ESSENTIAL HYPERTENSION: ICD-10-CM

## 2018-01-19 DIAGNOSIS — E66.01 MORBID OBESITY DUE TO EXCESS CALORIES: Chronic | ICD-10-CM

## 2018-01-19 DIAGNOSIS — G63 POLYNEUROPATHY ASSOCIATED WITH UNDERLYING DISEASE: ICD-10-CM

## 2018-01-19 DIAGNOSIS — N18.30 CKD (CHRONIC KIDNEY DISEASE) STAGE 3, GFR 30-59 ML/MIN: Chronic | ICD-10-CM

## 2018-01-19 DIAGNOSIS — E11.42 TYPE 2 DIABETES MELLITUS WITH DIABETIC POLYNEUROPATHY, WITH LONG-TERM CURRENT USE OF INSULIN: Primary | ICD-10-CM

## 2018-01-19 DIAGNOSIS — Z79.4 TYPE 2 DIABETES MELLITUS WITH DIABETIC POLYNEUROPATHY, WITH LONG-TERM CURRENT USE OF INSULIN: Primary | ICD-10-CM

## 2018-01-19 DIAGNOSIS — E78.5 DYSLIPIDEMIA: Chronic | ICD-10-CM

## 2018-01-19 DIAGNOSIS — D50.9 IRON DEFICIENCY ANEMIA, UNSPECIFIED IRON DEFICIENCY ANEMIA TYPE: Primary | ICD-10-CM

## 2018-01-19 DIAGNOSIS — Z12.11 SPECIAL SCREENING FOR MALIGNANT NEOPLASMS, COLON: Primary | ICD-10-CM

## 2018-01-19 PROCEDURE — 99999 PR PBB SHADOW E&M-EST. PATIENT-LVL III: CPT | Mod: PBBFAC,,, | Performed by: NURSE PRACTITIONER

## 2018-01-19 PROCEDURE — 99204 OFFICE O/P NEW MOD 45 MIN: CPT | Mod: S$PBB,,, | Performed by: INTERNAL MEDICINE

## 2018-01-19 PROCEDURE — 99214 OFFICE O/P EST MOD 30 MIN: CPT | Mod: S$PBB,,, | Performed by: NURSE PRACTITIONER

## 2018-01-19 PROCEDURE — 99213 OFFICE O/P EST LOW 20 MIN: CPT | Mod: PBBFAC,27 | Performed by: NURSE PRACTITIONER

## 2018-01-19 PROCEDURE — 99213 OFFICE O/P EST LOW 20 MIN: CPT | Mod: PBBFAC | Performed by: INTERNAL MEDICINE

## 2018-01-19 PROCEDURE — 99999 PR PBB SHADOW E&M-EST. PATIENT-LVL III: CPT | Mod: PBBFAC,,, | Performed by: INTERNAL MEDICINE

## 2018-01-19 RX ORDER — POLYETHYLENE GLYCOL 3350, SODIUM SULFATE ANHYDROUS, SODIUM BICARBONATE, SODIUM CHLORIDE, POTASSIUM CHLORIDE 236; 22.74; 6.74; 5.86; 2.97 G/4L; G/4L; G/4L; G/4L; G/4L
4 POWDER, FOR SOLUTION ORAL ONCE
Qty: 4000 ML | Refills: 0 | Status: SHIPPED | OUTPATIENT
Start: 2018-01-19 | End: 2018-01-19

## 2018-01-19 NOTE — PROGRESS NOTES
REASON FOR VISIT:  Iron deficiency anemia.    HISTORY OF PRESENT ILLNESS:  Ms. Mac is a 67-year-old with chronic anemia,   suspected to be multifactorial, has not seen any overt GI bleeding.  Prior stool   testings done by her hematologist have been negative for occult blood.  She   does have chronic gastroesophageal reflux disease for which she is on   pantoprazole daily and does complain of intermittent solid food dysphagia.    Denies any dysphagia to liquids, no chest pain.  No abdominal pain.  Her bowels   move regular.  No blood in the stool.  Her last colonoscopy was in 2008.  She   has had a history of previous polyps, but none in 2008, has not undergone an   upper endoscopy recently.  She denies taking any NSAIDs besides aspirin 81 mg   daily.    PAST MEDICAL, SURGICAL, SOCIAL AND FAMILY HISTORY:  Reviewed.    MEDICATIONS AND ALLERGIES:  Reviewed.    REVIEW OF SYSTEMS:  CONSTITUTIONAL:  No fever, no chills, no weight loss.  Appetite is normal.  EYES:  No visual changes.  ENT:  No odynophagia or hoarseness of voice.  CARDIOVASCULAR:  No angina or palpitations.  RESPIRATORY:  No shortness of breath or wheezing.  GASTROINTESTINAL:  See HPI.  GENITOURINARY:  No dysuria or frequency.  MUSCULOSKELETAL:  Arthralgia of the right knee.  SKIN:  No pruritus or eczema.  NEUROLOGIC:  No headache, no seizures.  PSYCHIATRIC:  No anxiety or depression.  GASTROINTESTINAL:  See HPI.    PHYSICAL EXAMINATION:  VITAL SIGNS:  See EPIC.  GENERAL:  Awake, alert and oriented x3, in no acute distress.  NECK:  Supple.  No carotid bruits.  No cervical adenopathy.  ABDOMEN:  Obese, soft, nondistended, nontender, no masses palpable.  No   hepatosplenomegaly appreciated.  Bowel sounds are normal.  EYES:  Conjunctivae anicteric.  ENT:  Oropharynx, mucosa moist.  Mallampati 3.  CARDIOVASCULAR:  S1, S2 normal.  RESPIRATORY:  Bilateral air entry equal.  SKIN:  No palmar erythema or spider angiomata.  NEUROLOGIC:  No asterixis.  PSYCHIATRY:   Affect appropriate.  LOWER EXTREMITY:  No pedal edema.    IMPRESSION:  Chronic anemia, most likely multifactorial.  We will proceed with   GI evaluation.    RECOMMENDATIONS:  1.  Proceed with EGD and colonoscopy.  2.  Further recommendation based on the above.  We will hold off on small bowel   imaging at this time.      ACT/HN  dd: 01/19/2018 08:12:12 (CST)  td: 01/19/2018 19:11:09 (CST)  Doc ID   #[id number]  Job ID #[id number]    CC:

## 2018-01-19 NOTE — TELEPHONE ENCOUNTER
----- Message from Ni Cervantes MA sent at 1/19/2018 12:54 PM CST -----  Contact: self  [phone]  or  [phone]  States she is calling regarding the paper work that was given to her and states it was not all given to her.  States please mail it to her since her appointment is on Friday, 1-26-18.

## 2018-01-19 NOTE — LETTER
January 19, 2018      Ramonita Toledo MD  [address] [facility] [address]           [facility] - Gastroenterology  [address]  [place] [address]  Phone: [phone]  Fax: [phone]          Patient: Annika Mac   MR Number: [id number]   YOB: 1950   Date of Visit: 1/19/2018       Dear Dr. aRmonita Toledo:    Thank you for referring Annika Mac to me for evaluation. Attached you will find relevant portions of my assessment and plan of care.    If you have questions, please do not hesitate to call me. I look forward to following Annika Mac along with you.    Sincerely,    Brandt Lopez MD    Enclosure  CC:  No Recipients    If you would like to receive this communication electronically, please contact [email or web address] or [phone] to request more information on [employer] access.    For providers and/or their staff who would like to refer a patient to [facility], please contact us through our one-stop-shop provider referral line, [facility], at [phone].    If you feel you have received this communication in error or would no longer like to receive these types of communications, please e-mail [email or web address]

## 2018-01-23 ENCOUNTER — TELEPHONE (OUTPATIENT)
Dept: INTERNAL MEDICINE | Facility: CLINIC | Age: 68
End: 2018-01-23

## 2018-01-23 DIAGNOSIS — N18.30 CKD (CHRONIC KIDNEY DISEASE) STAGE 3, GFR 30-59 ML/MIN: Chronic | ICD-10-CM

## 2018-01-23 NOTE — TELEPHONE ENCOUNTER
----- Message from Kellie Pavon sent at 1/23/2018  7:38 AM CST -----  Contact: [employer] [facility]/[employer]/[phone]   Pharmacy called in regards to getting a Rx refill for allopurinol (ZYLOPRIM) 100 MG tablet/colchicine 0.6 mg tablet/spironolactone (ALDACTONE) 25 MG tablet/fexofenadine (ALLEGRA) 180 MG tablet/ 90 day with up to 3 refill. The would like the Rx to be e scribed to the pharmacy. There e scribe address [address], [facility], [address].      [facility] pharmacy  Please advise

## 2018-02-02 DIAGNOSIS — E11.43 DIABETIC AUTONOMIC NEUROPATHY ASSOCIATED WITH TYPE 2 DIABETES MELLITUS: ICD-10-CM

## 2018-02-02 DIAGNOSIS — G89.29 CHRONIC LOW BACK PAIN WITH SCIATICA, SCIATICA LATERALITY UNSPECIFIED, UNSPECIFIED BACK PAIN LATERALITY: ICD-10-CM

## 2018-02-02 DIAGNOSIS — M54.40 CHRONIC LOW BACK PAIN WITH SCIATICA, SCIATICA LATERALITY UNSPECIFIED, UNSPECIFIED BACK PAIN LATERALITY: ICD-10-CM

## 2018-02-02 DIAGNOSIS — I10 ESSENTIAL HYPERTENSION: ICD-10-CM

## 2018-02-02 DIAGNOSIS — M25.561 ACUTE PAIN OF RIGHT KNEE: ICD-10-CM

## 2018-02-02 DIAGNOSIS — H26.493 PCO (POSTERIOR CAPSULAR OPACIFICATION), BILATERAL: ICD-10-CM

## 2018-02-02 DIAGNOSIS — M47.816 LUMBAR FACET ARTHROPATHY: ICD-10-CM

## 2018-02-02 DIAGNOSIS — M17.11 PRIMARY OSTEOARTHRITIS OF RIGHT KNEE: ICD-10-CM

## 2018-02-02 DIAGNOSIS — M48.061 SPINAL STENOSIS OF LUMBAR REGION, UNSPECIFIED WHETHER NEUROGENIC CLAUDICATION PRESENT: ICD-10-CM

## 2018-02-02 RX ORDER — DULOXETIN HYDROCHLORIDE 30 MG/1
30 CAPSULE, DELAYED RELEASE ORAL DAILY
Qty: 90 CAPSULE | Refills: 3 | Status: SHIPPED | OUTPATIENT
Start: 2018-02-02 | End: 2018-02-07 | Stop reason: SDUPTHER

## 2018-02-02 RX ORDER — LABETALOL 300 MG/1
300 TABLET, FILM COATED ORAL 2 TIMES DAILY
Qty: 180 TABLET | Refills: 2 | Status: SHIPPED | OUTPATIENT
Start: 2018-02-02 | End: 2018-08-21 | Stop reason: SDUPTHER

## 2018-02-02 RX ORDER — FUROSEMIDE 20 MG/1
20 TABLET ORAL DAILY
Qty: 90 TABLET | Refills: 2 | Status: SHIPPED | OUTPATIENT
Start: 2018-02-02 | End: 2018-08-21 | Stop reason: SDUPTHER

## 2018-02-02 RX ORDER — IRBESARTAN 300 MG/1
150 TABLET ORAL NIGHTLY
Qty: 90 TABLET | Refills: 2 | Status: SHIPPED | OUTPATIENT
Start: 2018-02-02 | End: 2018-08-21 | Stop reason: SDUPTHER

## 2018-02-02 RX ORDER — PREGABALIN 100 MG/1
CAPSULE ORAL
Qty: 60 CAPSULE | Refills: 2 | Status: SHIPPED | OUTPATIENT
Start: 2018-02-02 | End: 2018-02-07 | Stop reason: SDUPTHER

## 2018-02-02 NOTE — TELEPHONE ENCOUNTER
----- Message from Elizabeth Brower sent at 2/2/2018 10:56 AM CST -----  Contact: pt @ [phone] or [phone]  Patient is requesting a new 90 day prescription for LYRICA 100 mg capsule and DULoxetine (CYMBALTA) 30 MG capsule. Plz send to new pharmacy [facility]. [phone]

## 2018-02-02 NOTE — TELEPHONE ENCOUNTER
----- Message from Indiana Davis sent at 2/2/2018 11:08 AM CST -----  Contact: patient  Please call pt at [phone] or [phone] if any questions. Refills needed for Labetalol 300 mg, Irbesartan 300 mg and Furosemide 20 mg called into [facility] at [phone]. Patient only has a 5 day supply left. Last seen Dr Mcguire 07/27/17    Thank you

## 2018-02-07 ENCOUNTER — PATIENT MESSAGE (OUTPATIENT)
Dept: ENDOCRINOLOGY | Facility: CLINIC | Age: 68
End: 2018-02-07

## 2018-02-07 DIAGNOSIS — M47.816 LUMBAR FACET ARTHROPATHY: ICD-10-CM

## 2018-02-07 DIAGNOSIS — G89.29 CHRONIC LOW BACK PAIN WITH SCIATICA, SCIATICA LATERALITY UNSPECIFIED, UNSPECIFIED BACK PAIN LATERALITY: ICD-10-CM

## 2018-02-07 DIAGNOSIS — M17.11 PRIMARY OSTEOARTHRITIS OF RIGHT KNEE: ICD-10-CM

## 2018-02-07 DIAGNOSIS — M48.061 SPINAL STENOSIS OF LUMBAR REGION, UNSPECIFIED WHETHER NEUROGENIC CLAUDICATION PRESENT: ICD-10-CM

## 2018-02-07 DIAGNOSIS — H26.493 PCO (POSTERIOR CAPSULAR OPACIFICATION), BILATERAL: ICD-10-CM

## 2018-02-07 DIAGNOSIS — M25.561 ACUTE PAIN OF RIGHT KNEE: ICD-10-CM

## 2018-02-07 DIAGNOSIS — E11.43 DIABETIC AUTONOMIC NEUROPATHY ASSOCIATED WITH TYPE 2 DIABETES MELLITUS: ICD-10-CM

## 2018-02-07 DIAGNOSIS — M54.40 CHRONIC LOW BACK PAIN WITH SCIATICA, SCIATICA LATERALITY UNSPECIFIED, UNSPECIFIED BACK PAIN LATERALITY: ICD-10-CM

## 2018-02-07 RX ORDER — PREGABALIN 100 MG/1
CAPSULE ORAL
Qty: 180 CAPSULE | Refills: 1 | Status: SHIPPED | OUTPATIENT
Start: 2018-02-07 | End: 2018-05-08 | Stop reason: SDUPTHER

## 2018-02-07 RX ORDER — DULOXETIN HYDROCHLORIDE 30 MG/1
30 CAPSULE, DELAYED RELEASE ORAL DAILY
Qty: 90 CAPSULE | Refills: 1 | Status: SHIPPED | OUTPATIENT
Start: 2018-02-07 | End: 2018-07-25 | Stop reason: SDUPTHER

## 2018-02-07 RX ORDER — BIOTIN 1 MG
1 TABLET ORAL 4 TIMES DAILY
Qty: 400 STRIP | Refills: 4 | Status: SHIPPED | OUTPATIENT
Start: 2018-02-07 | End: 2018-09-19 | Stop reason: SDUPTHER

## 2018-02-07 NOTE — TELEPHONE ENCOUNTER
Received a call from pharmacy. Pharmacy stated that they need clear direction for indulin. Please verified a dose for the pt. Please advise.

## 2018-02-21 ENCOUNTER — OFFICE VISIT (OUTPATIENT)
Dept: PHYSICAL MEDICINE AND REHAB | Facility: CLINIC | Age: 68
End: 2018-02-21
Payer: MEDICARE

## 2018-02-21 ENCOUNTER — OFFICE VISIT (OUTPATIENT)
Dept: PODIATRY | Facility: CLINIC | Age: 68
End: 2018-02-21
Payer: MEDICARE

## 2018-02-21 VITALS
BODY MASS INDEX: 48.82 KG/M2 | HEART RATE: 67 BPM | DIASTOLIC BLOOD PRESSURE: 68 MMHG | SYSTOLIC BLOOD PRESSURE: 133 MMHG | HEIGHT: 65 IN | WEIGHT: 293 LBS

## 2018-02-21 VITALS
HEART RATE: 67 BPM | BODY MASS INDEX: 48.82 KG/M2 | HEIGHT: 65 IN | DIASTOLIC BLOOD PRESSURE: 59 MMHG | SYSTOLIC BLOOD PRESSURE: 115 MMHG | RESPIRATION RATE: 18 BRPM | WEIGHT: 293 LBS

## 2018-02-21 DIAGNOSIS — M25.561 ACUTE PAIN OF RIGHT KNEE: ICD-10-CM

## 2018-02-21 DIAGNOSIS — G89.29 CHRONIC LOW BACK PAIN WITH SCIATICA, SCIATICA LATERALITY UNSPECIFIED, UNSPECIFIED BACK PAIN LATERALITY: ICD-10-CM

## 2018-02-21 DIAGNOSIS — H26.493 PCO (POSTERIOR CAPSULAR OPACIFICATION), BILATERAL: ICD-10-CM

## 2018-02-21 DIAGNOSIS — M54.40 CHRONIC LOW BACK PAIN WITH SCIATICA, SCIATICA LATERALITY UNSPECIFIED, UNSPECIFIED BACK PAIN LATERALITY: ICD-10-CM

## 2018-02-21 DIAGNOSIS — M47.816 LUMBAR FACET ARTHROPATHY: ICD-10-CM

## 2018-02-21 DIAGNOSIS — M17.11 PRIMARY OSTEOARTHRITIS OF RIGHT KNEE: ICD-10-CM

## 2018-02-21 DIAGNOSIS — L84 CORN OR CALLUS: ICD-10-CM

## 2018-02-21 DIAGNOSIS — E11.43 DIABETIC AUTONOMIC NEUROPATHY ASSOCIATED WITH TYPE 2 DIABETES MELLITUS: ICD-10-CM

## 2018-02-21 DIAGNOSIS — M47.22 OSTEOARTHRITIS OF SPINE WITH RADICULOPATHY, CERVICAL REGION: ICD-10-CM

## 2018-02-21 DIAGNOSIS — E66.01 CLASS 3 SEVERE OBESITY DUE TO EXCESS CALORIES WITH SERIOUS COMORBIDITY AND BODY MASS INDEX (BMI) OF 50.0 TO 59.9 IN ADULT: Primary | ICD-10-CM

## 2018-02-21 DIAGNOSIS — M48.062 SPINAL STENOSIS OF LUMBAR REGION WITH NEUROGENIC CLAUDICATION: ICD-10-CM

## 2018-02-21 DIAGNOSIS — M79.2 RADICULAR PAIN IN LEFT ARM: ICD-10-CM

## 2018-02-21 DIAGNOSIS — G63 POLYNEUROPATHY ASSOCIATED WITH UNDERLYING DISEASE: ICD-10-CM

## 2018-02-21 DIAGNOSIS — E11.49 TYPE II DIABETES MELLITUS WITH NEUROLOGICAL MANIFESTATIONS: Primary | ICD-10-CM

## 2018-02-21 DIAGNOSIS — B35.1 ONYCHOMYCOSIS DUE TO DERMATOPHYTE: ICD-10-CM

## 2018-02-21 PROBLEM — E66.09 OBESITY DUE TO EXCESS CALORIES: Status: ACTIVE | Noted: 2018-02-21

## 2018-02-21 PROCEDURE — 1159F MED LIST DOCD IN RCRD: CPT | Mod: ,,, | Performed by: PHYSICAL MEDICINE & REHABILITATION

## 2018-02-21 PROCEDURE — 99213 OFFICE O/P EST LOW 20 MIN: CPT | Mod: PBBFAC,25,27 | Performed by: PHYSICAL MEDICINE & REHABILITATION

## 2018-02-21 PROCEDURE — 99499 UNLISTED E&M SERVICE: CPT | Mod: S$PBB,,, | Performed by: PODIATRIST

## 2018-02-21 PROCEDURE — 99214 OFFICE O/P EST MOD 30 MIN: CPT | Mod: S$PBB,,, | Performed by: PHYSICAL MEDICINE & REHABILITATION

## 2018-02-21 PROCEDURE — 11721 DEBRIDE NAIL 6 OR MORE: CPT | Mod: 59,Q9,S$PBB, | Performed by: PODIATRIST

## 2018-02-21 PROCEDURE — 99213 OFFICE O/P EST LOW 20 MIN: CPT | Mod: PBBFAC | Performed by: PODIATRIST

## 2018-02-21 PROCEDURE — 11056 PARNG/CUTG B9 HYPRKR LES 2-4: CPT | Mod: 59,Q9,PBBFAC | Performed by: PODIATRIST

## 2018-02-21 PROCEDURE — 99999 PR PBB SHADOW E&M-EST. PATIENT-LVL III: CPT | Mod: PBBFAC,,, | Performed by: PODIATRIST

## 2018-02-21 PROCEDURE — 11056 PARNG/CUTG B9 HYPRKR LES 2-4: CPT | Mod: Q9,S$PBB,, | Performed by: PODIATRIST

## 2018-02-21 PROCEDURE — 11721 DEBRIDE NAIL 6 OR MORE: CPT | Mod: Q9,PBBFAC,59 | Performed by: PODIATRIST

## 2018-02-21 PROCEDURE — 1125F AMNT PAIN NOTED PAIN PRSNT: CPT | Mod: ,,, | Performed by: PHYSICAL MEDICINE & REHABILITATION

## 2018-02-21 PROCEDURE — 99999 PR PBB SHADOW E&M-EST. PATIENT-LVL III: CPT | Mod: PBBFAC,,, | Performed by: PHYSICAL MEDICINE & REHABILITATION

## 2018-02-21 RX ORDER — HYDROCODONE BITARTRATE AND ACETAMINOPHEN 10; 325 MG/1; MG/1
1 TABLET ORAL EVERY 8 HOURS PRN
Qty: 90 TABLET | Refills: 0 | Status: SHIPPED | OUTPATIENT
Start: 2018-04-21 | End: 2018-06-18

## 2018-02-21 RX ORDER — HYDROCODONE BITARTRATE AND ACETAMINOPHEN 10; 325 MG/1; MG/1
1 TABLET ORAL EVERY 8 HOURS PRN
Qty: 90 TABLET | Refills: 0 | Status: SHIPPED | OUTPATIENT
Start: 2018-03-21 | End: 2018-04-20

## 2018-02-21 RX ORDER — HYDROCODONE BITARTRATE AND ACETAMINOPHEN 10; 325 MG/1; MG/1
1 TABLET ORAL EVERY 8 HOURS PRN
Qty: 90 TABLET | Refills: 0 | Status: SHIPPED | OUTPATIENT
Start: 2018-02-21 | End: 2018-03-23

## 2018-02-21 NOTE — PROGRESS NOTES
"Subjective:       Patient ID: Annika Mac is a 67 y.o. female.    Chief Complaint: Knee Pain and Back Pain    Back Pain   Associated symptoms include leg pain. Pertinent negatives include no abdominal pain, chest pain, fever, numbness or weakness. Headaches:     Knee Pain    Pertinent negatives include no numbness.   Arm Pain    Pertinent negatives include no chest pain or numbness.   Leg Pain    Pertinent negatives include no numbness.   Patient is 66 y/o female , who returns to clinic for chronic back pain.   LCV 11/21/17.  Today, she complains about back and Rt knee pain.  #1 back pain, leg pain,   Current back pain is  6,  worst pain are7/10, best is 3-4 with meds.  #2 Rt knee pain.    Current knee pain is 7,worst pan is 10/10, best is 3/10 with meds.     #1 back pain, leg pain,   Current back pain is  6,  worst pain are7/10, best is 3-4.  Pain is present at all time, accross lower back in midline of tail bone. Back pain is more dull pain, and leg pain is more shooting pain art the top of thighs, and bellow the knee.   Pain is present daytime, and radiates to both legs on front of legs.Cannot walk <  ft using a cane,   stands straight < 5 minutes.   She has to lean forward even with cane or during walking.   Back pain increases, and leg become weak.   She has diabetic neuropathy in both feet, top and bottom, tingling pain.  Does not have "charilie horses".   Sitting down helps a little bit, and lying down  ( on side, or stomach).   She failed Neurontin 300 mg  for maybe 6 yrs, and takes 3 pills a day, that does not help much.   And , Gabapentin is eventually changed to Lyrica 100 mg that she takes daily (approved by insurance) , and pain in hands is gone. Tolerates Lyrica well.   She has been getting NAYLA since 2011,by Dr. Quinn and [place] with NAYLA, with some pain relief.   She had a second B/l L5/S1 TF NAYLA on 08/18/16, and she reported that helped a lot > 80% and lasted for only 2 weeks.   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   She is using for gait: SC, manual WC, and RW.  Here for follow up, re evaluation and treatment.     Past Medical History:   Diagnosis Date    Allergy     Anemia 2012    Arthritis     CHF (congestive heart failure)     CKD (chronic kidney disease) stage 3, GFR 30-59 ml/min 2012    Clotting disorder     Deep vein thrombophlebitis of left leg 2012    Degenerative disc disease     Diabetic peripheral neuropathy associated with type 2 diabetes mellitus 2012    GERD (gastroesophageal reflux disease)     HTN (hypertension) 2012    Hyperlipidemia 2012    Lead-induced gout of ankle or foot     right going on three weeks    Nonproliferative diabetic retinopathy of right eye 2012    Obstructive sleep apnea 2012    Osteoporosis     Proliferative diabetic retinopathy of left eye 2012    Stroke 2003    Type 2 diabetes mellitus with diabetic polyneuropathy 2012    Ulcer        Past Surgical History:   Procedure Laterality Date    CATARACT EXTRACTION W/  INTRAOCULAR LENS IMPLANT Left 3/2002    left     CATARACT EXTRACTION W/  INTRAOCULAR LENS IMPLANT Right     OD dr. olivares     SECTION      CYST REMOVAL  2011    left side of face    EYE SURGERY Bilateral 2012    eye implants    GANGLION CYST EXCISION  2007    Right wrist    Pan Retinal Photocoagulation Bilateral     Dr. Monterroso (Proliferative Diabetic Retinopathy)    TOTAL ABDOMINAL HYSTERECTOMY  1982    TOTAL KNEE ARTHROPLASTY  2006    left    TRIGGER FINGER RELEASE  2009       Family History   Problem Relation Age of Onset    Diabetes Mother     Hypertension Mother     Heart disease  Father     Diabetes Sister     Thyroid disease Brother     Diabetes Brother     Hypertension Brother     Amblyopia Neg Hx     Blindness Neg Hx     Glaucoma Neg Hx     Macular degeneration Neg Hx     Retinal detachment Neg Hx     Strabismus Neg Hx     Colon cancer Neg Hx     Esophageal cancer Neg Hx        Social History     Social History    Marital status:      Spouse name: Heber    Number of children: 3    Years of education: N/A     Social History Main Topics    Smoking status: Never Smoker    Smokeless tobacco: Never Used    Alcohol use No    Drug use: No    Sexual activity: Yes     Partners: Male     Other Topics Concern    Not on file     Social History Narrative    , lives with family; 3 children, 2 grandchildren       Current Outpatient Prescriptions   Medication Sig Dispense Refill    albuterol 90 mcg/actuation inhaler Inhale 2 puffs into the lungs every 6 (six) hours as needed for Wheezing. 1 Inhaler 0    allopurinol (ZYLOPRIM) 100 MG tablet TAKE 1 TABLET BY MOUTH ONCE DAILY 30 tablet 0    amLODIPine (NORVASC) 5 MG tablet Take 1 tablet (5 mg total) by mouth once daily. 90 tablet 1    aspirin 81 MG Chew Take 1 tablet (81 mg total) by mouth once daily.  0    azelastine (ASTELIN) 137 mcg (0.1 %) nasal spray 1 spray (137 mcg total) by Nasal route 2 (two) times daily. 30 mL 11    blood-glucose meter kit Use as instructed, true test/result meter 1 each 0    colchicine 0.6 mg tablet Take 1 tablet (0.6 mg total) by mouth once daily. 30 tablet 3    DULoxetine (CYMBALTA) 30 MG capsule Take 1 capsule (30 mg total) by mouth once daily. 90 capsule 1    ferrous sulfate 325 mg (65 mg iron) Tab tablet Take 1 tablet (325 mg total) by mouth 2 (two) times daily.  0    fexofenadine (ALLEGRA) 180 MG tablet TAKE 1 TABLET BY MOUTH ONCE DAILY 30 tablet 0    fluticasone (FLONASE) 50 mcg/actuation nasal spray 2 sprays by Each Nare route once daily. 48 g 6    furosemide (LASIX) 20 MG  "tablet Take 1 tablet (20 mg total) by mouth once daily. 90 tablet 2    hydrocodone-acetaminophen 10-325mg (NORCO)  mg Tab Take 1 tablet by mouth every 8 (eight) hours as needed for Pain. 90 tablet 0    [START ON 3/21/2018] hydrocodone-acetaminophen 10-325mg (NORCO)  mg Tab Take 1 tablet by mouth every 8 (eight) hours as needed for Pain. 90 tablet 0    [START ON 4/21/2018] hydrocodone-acetaminophen 10-325mg (NORCO)  mg Tab Take 1 tablet by mouth every 8 (eight) hours as needed for Pain. 90 tablet 0    insulin regular hum U-500 conc (HUMULIN R U-500, CONC, KWIKPEN) 500 unit/mL (3 mL) InPn Inject  Units into the skin 3 (three) times daily before meals. Take 100u with breakfast, 70u with lunch, and 35u with dinner 11 Syringe 3    irbesartan (AVAPRO) 300 MG tablet Take 0.5 tablets (150 mg total) by mouth every evening. 90 tablet 2    labetalol (NORMODYNE) 300 MG tablet Take 1 tablet (300 mg total) by mouth 2 (two) times daily. 180 tablet 2    lancets 31 gauge Misc 1 lancet by Misc.(Non-Drug; Combo Route) route 4 (four) times daily. 150 each 6    levocetirizine (XYZAL) 5 MG tablet Take 1 tablet (5 mg total) by mouth every evening. 7 tablet 0    montelukast (SINGULAIR) 10 mg tablet TAKE 1 TABLET(10 MG) BY MOUTH EVERY EVENING 90 tablet 0    nystatin (MYCOSTATIN) powder Apply topically 3 (three) times daily. 60 g 2    pantoprazole (PROTONIX) 40 MG tablet Take 1 tablet (40 mg total) by mouth once daily. 90 tablet 3    pen needle, diabetic 32 gauge x 5/32" Ndle Use with multiple daily insulin injections 100 each 12    pregabalin (LYRICA) 100 MG capsule TAKE 1 CAPSULE BY MOUTH TWO TIMES DAILY 180 capsule 1    simvastatin (ZOCOR) 40 MG tablet Take 0.5 tablets (20 mg total) by mouth every evening. 90 tablet 4    SITagliptin (JANUVIA) 50 MG Tab Take 1 tablet (50 mg total) by mouth once daily. 90 tablet 4    spironolactone (ALDACTONE) 25 MG tablet Take 0.5 tablets (12.5 mg total) by mouth " once daily. 90 tablet 0    TRUETEST TEST STRIPS Strp 1 strip by Misc.(Non-Drug; Combo Route) route 4 (four) times daily. 400 strip 4     No current facility-administered medications for this visit.        Review of patient's allergies indicates:   Allergen Reactions    Iodinated contrast media - oral and iv dye Rash         Review of Systems   Constitutional: Negative.  Negative for appetite change, chills, fatigue, fever and unexpected weight change.   HENT: Negative.  Negative for drooling, trouble swallowing and voice change.    Eyes: Negative.  Negative for pain and visual disturbance.   Respiratory: Negative.  Negative for shortness of breath and wheezing.    Cardiovascular: Negative.  Negative for chest pain and palpitations.   Gastrointestinal: Negative.  Negative for abdominal distention, abdominal pain, constipation and diarrhea.   Genitourinary: Negative.  Negative for difficulty urinating.   Musculoskeletal: Positive for back pain. Negative for arthralgias, gait problem, joint swelling, myalgias and neck stiffness.               Skin: Negative.  Negative for color change and rash.   Neurological: Negative.  Negative for dizziness, facial asymmetry, speech difficulty, weakness, light-headedness and numbness. Headaches:     Hematological: Negative for adenopathy.   Psychiatric/Behavioral: Negative.  Negative for behavioral problems, confusion and sleep disturbance. The patient is not nervous/anxious.            Objective:      Physical Exam      GENERAL: The patient is alert, oriented, pleasant.   HEENT; PERRLA  NECK: supple   MUSCULOSKELETAL:   Gait - slow james , on wide basis, using RW.   Cervical spine :  Minimally decreased AROM in cervical spine, flexion to 60, extension to  0,,   side bending and rotation  to 30-35 degrees without  Pain ,   No paravertebral cervical musculature tenderness    No  tenderness in upper trapezius muscles    Lumbar spine, full range of motion in all planes, flexion to  90 degrees , ext. 0.  Side bending and rotation to 35-40 degrees.   Straight leg raising Negative bilaterally.   Full range of motion in all joints x4 extremities, except in RT knee, that is very painful at pattellar lower pole/border.  Appears high riding patella, hat is maybe  lateray displaced,  Muscle strength 5/5 throughout x4 extremities.   No  joint laxity throughout x4 extremities.   NEUROLOGIC: Cranial nerves II through XII intact.   Deep tendon reflexes is normal, +2 in the upper and lower extremities bilaterally.   Muscle tone is normal.   Sensory is intact to light touch and pinprick throughout x4 extremities.     MRI of Lumbar spine showed:  T12-L1:  There is no focal disc herniation.  No significant spinal canal narrowing.    No significant neural foraminal narrowing.  L1-2:  There is no focal disc herniation.  No significant spinal canal narrowing.    No significant neural foraminal narrowing.  L2-3:  There is no focal disc herniation.  No significant spinal canal narrowing.    No significant neural foraminal narrowing.  L3-4:  There is a minimal circumferential disk bulge and mild bilateral facet arthropathy which results in no significant spinal canal or neural foraminal narrowing.  L4-5:    There is circumferential disk bulge (eccentric to the left), moderate bilateral hypertrophic facet arthropathy, and ligamentum flavum thickening which results in mild spinal canal narrowing and no significant neural foraminal narrowing.      L5-S1:  There is circumferential disk bulge and severe hypertrophic facet arthropathy   with ligamentum flavum hypertrophy which results in mild spinal canal narrowing, moderate left neural foraminal narrowing, and mild right neural foraminal narrowing.i  Impression:   multilevel degenerative changes of the lumbar spine consisting of circumferential disk bulges, hypertrophic facet arthropathy, and ligamentum flavum hypertrophy   which result in mild spinal canal narrowing at  L4-5  and L5-S1 as well as moderate left and mild right neural foraminal narrowing at L5-S1.      Assessment:       1. Class 3 severe obesity due to excess calories with serious comorbidity and body mass index (BMI) of 50.0 to 59.9 in adult    2. Lumbar facet arthropathy    3. Chronic low back pain with sciatica, sciatica laterality unspecified, unspecified back pain laterality    4. Primary osteoarthritis of right knee    5. Spinal stenosis of lumbar region with neurogenic claudication    6. Acute pain of right knee    7. PCO (posterior capsular opacification), bilateral    8. Diabetic autonomic neuropathy associated with type 2 diabetes mellitus    9. Polyneuropathy associated with underlying disease    10. Osteoarthritis of spine with radiculopathy, cervical region    11. Radicular pain in left arm      Plan:       Class 3 severe obesity due to excess calories with serious comorbidity and body mass index (BMI) of 50.0 to 59.9 in adult  -     Ambulatory Referral to Bariatric Medicine    Lumbar facet arthropathy  -     hydrocodone-acetaminophen 10-325mg (NORCO)  mg Tab; Take 1 tablet by mouth every 8 (eight) hours as needed for Pain.  Dispense: 90 tablet; Refill: 0  -     hydrocodone-acetaminophen 10-325mg (NORCO)  mg Tab; Take 1 tablet by mouth every 8 (eight) hours as needed for Pain.  Dispense: 90 tablet; Refill: 0  -     hydrocodone-acetaminophen 10-325mg (NORCO)  mg Tab; Take 1 tablet by mouth every 8 (eight) hours as needed for Pain.  Dispense: 90 tablet; Refill: 0    Chronic low back pain with sciatica, sciatica laterality unspecified, unspecified back pain laterality  -     hydrocodone-acetaminophen 10-325mg (NORCO)  mg Tab; Take 1 tablet by mouth every 8 (eight) hours as needed for Pain.  Dispense: 90 tablet; Refill: 0  -     hydrocodone-acetaminophen 10-325mg (NORCO)  mg Tab; Take 1 tablet by mouth every 8 (eight) hours as needed for Pain.  Dispense: 90 tablet; Refill: 0  -      hydrocodone-acetaminophen 10-325mg (NORCO)  mg Tab; Take 1 tablet by mouth every 8 (eight) hours as needed for Pain.  Dispense: 90 tablet; Refill: 0    Primary osteoarthritis of right knee  -     hydrocodone-acetaminophen 10-325mg (NORCO)  mg Tab; Take 1 tablet by mouth every 8 (eight) hours as needed for Pain.  Dispense: 90 tablet; Refill: 0  -     hydrocodone-acetaminophen 10-325mg (NORCO)  mg Tab; Take 1 tablet by mouth every 8 (eight) hours as needed for Pain.  Dispense: 90 tablet; Refill: 0  -     hydrocodone-acetaminophen 10-325mg (NORCO)  mg Tab; Take 1 tablet by mouth every 8 (eight) hours as needed for Pain.  Dispense: 90 tablet; Refill: 0    Spinal stenosis of lumbar region with neurogenic claudication  -     hydrocodone-acetaminophen 10-325mg (NORCO)  mg Tab; Take 1 tablet by mouth every 8 (eight) hours as needed for Pain.  Dispense: 90 tablet; Refill: 0  -     hydrocodone-acetaminophen 10-325mg (NORCO)  mg Tab; Take 1 tablet by mouth every 8 (eight) hours as needed for Pain.  Dispense: 90 tablet; Refill: 0  -     hydrocodone-acetaminophen 10-325mg (NORCO)  mg Tab; Take 1 tablet by mouth every 8 (eight) hours as needed for Pain.  Dispense: 90 tablet; Refill: 0    Acute pain of right knee  -     hydrocodone-acetaminophen 10-325mg (NORCO)  mg Tab; Take 1 tablet by mouth every 8 (eight) hours as needed for Pain.  Dispense: 90 tablet; Refill: 0  -     hydrocodone-acetaminophen 10-325mg (NORCO)  mg Tab; Take 1 tablet by mouth every 8 (eight) hours as needed for Pain.  Dispense: 90 tablet; Refill: 0  -     hydrocodone-acetaminophen 10-325mg (NORCO)  mg Tab; Take 1 tablet by mouth every 8 (eight) hours as needed for Pain.  Dispense: 90 tablet; Refill: 0    PCO (posterior capsular opacification), bilateral  -     hydrocodone-acetaminophen 10-325mg (NORCO)  mg Tab; Take 1 tablet by mouth every 8 (eight) hours as needed for Pain.  Dispense: 90  tablet; Refill: 0  -     hydrocodone-acetaminophen 10-325mg (NORCO)  mg Tab; Take 1 tablet by mouth every 8 (eight) hours as needed for Pain.  Dispense: 90 tablet; Refill: 0  -     hydrocodone-acetaminophen 10-325mg (NORCO)  mg Tab; Take 1 tablet by mouth every 8 (eight) hours as needed for Pain.  Dispense: 90 tablet; Refill: 0    Diabetic autonomic neuropathy associated with type 2 diabetes mellitus  -     hydrocodone-acetaminophen 10-325mg (NORCO)  mg Tab; Take 1 tablet by mouth every 8 (eight) hours as needed for Pain.  Dispense: 90 tablet; Refill: 0  -     hydrocodone-acetaminophen 10-325mg (NORCO)  mg Tab; Take 1 tablet by mouth every 8 (eight) hours as needed for Pain.  Dispense: 90 tablet; Refill: 0  -     hydrocodone-acetaminophen 10-325mg (NORCO)  mg Tab; Take 1 tablet by mouth every 8 (eight) hours as needed for Pain.  Dispense: 90 tablet; Refill: 0    Polyneuropathy associated with underlying disease  -     hydrocodone-acetaminophen 10-325mg (NORCO)  mg Tab; Take 1 tablet by mouth every 8 (eight) hours as needed for Pain.  Dispense: 90 tablet; Refill: 0  -     hydrocodone-acetaminophen 10-325mg (NORCO)  mg Tab; Take 1 tablet by mouth every 8 (eight) hours as needed for Pain.  Dispense: 90 tablet; Refill: 0  -     hydrocodone-acetaminophen 10-325mg (NORCO)  mg Tab; Take 1 tablet by mouth every 8 (eight) hours as needed for Pain.  Dispense: 90 tablet; Refill: 0    Osteoarthritis of spine with radiculopathy, cervical region  -     hydrocodone-acetaminophen 10-325mg (NORCO)  mg Tab; Take 1 tablet by mouth every 8 (eight) hours as needed for Pain.  Dispense: 90 tablet; Refill: 0  -     hydrocodone-acetaminophen 10-325mg (NORCO)  mg Tab; Take 1 tablet by mouth every 8 (eight) hours as needed for Pain.  Dispense: 90 tablet; Refill: 0  -     hydrocodone-acetaminophen 10-325mg (NORCO)  mg Tab; Take 1 tablet by mouth every 8 (eight) hours as needed  for Pain.  Dispense: 90 tablet; Refill: 0    Radicular pain in left arm  -     hydrocodone-acetaminophen 10-325mg (NORCO)  mg Tab; Take 1 tablet by mouth every 8 (eight) hours as needed for Pain.  Dispense: 90 tablet; Refill: 0  -     hydrocodone-acetaminophen 10-325mg (NORCO)  mg Tab; Take 1 tablet by mouth every 8 (eight) hours as needed for Pain.  Dispense: 90 tablet; Refill: 0  -     hydrocodone-acetaminophen 10-325mg (NORCO)  mg Tab; Take 1 tablet by mouth every 8 (eight) hours as needed for Pain.  Dispense: 90 tablet; Refill: 0      Patient with chronic low back pain, secondary to lumbar spondylosis, possible radiculopathy, cx with morbid obesity.   Also with gait instability secondary to severe DJD of Rt knee.    1. Pain management.  Will resume  Hydrocodone to 10/325 mg , and Gabapentin is changed to  Lyrica, that is helping, will increase to 150 mg BID   ( approved by insurance, and pain in hands is gone),   Added Cymbalta.     2. Rt knee pain , Xray of knee showed severe DJD.  Knee brace for stability,and proprioception.  Seen by Betsey Paige PA ,and dr. [facility], who recommended no surgery in this high risk pt, weight loss in recommended before re evaluation.  She is s/p steroid Rt knee injection with pain improvement for 4 days, only. Now trying to loose weight.    RTC in 3 months.     Total time spent face to face with patient was 25 minutes.   More than 50% of that time was spent in counseling on diagnosis , prognosis and treatment options.   I also caunsel patient  on common and most usual side effect of prescribed medications.   Risk and benefits of opiates, possible risk of developing opiate dependence and tolerance, need of strict compliance with prescribed medications.  I reviewed Primary care , and other specialty's notes to better coordinate patient's  care.   All questions were answered, and patient voiced understanding.                                                                                                                                                                           .

## 2018-02-22 NOTE — PROGRESS NOTES
Subjective:      Patient ID: Annika Mac is a 67 y.o. female.    Chief Complaint: PCP (Cody Villatoro MD 10/13/17); Diabetic Foot Exam; and Nail Care    Annika is a 67 y.o. female who presents to the clinic for evaluation and treatment of high risk feet. Annika has a past medical history of Allergy; Anemia (7/27/2012); Arthritis; CHF (congestive heart failure); CKD (chronic kidney disease) stage 3, GFR 30-59 ml/min (7/27/2012); Clotting disorder; Deep vein thrombophlebitis of left leg (7/27/2012); Degenerative disc disease; Diabetic peripheral neuropathy associated with type 2 diabetes mellitus (7/27/2012); GERD (gastroesophageal reflux disease); HTN (hypertension) (7/27/2012); Hyperlipidemia (7/27/2012); Lead-induced gout of ankle or foot; Nonproliferative diabetic retinopathy of right eye (7/27/2012); Obstructive sleep apnea (7/27/2012); Osteoporosis; Proliferative diabetic retinopathy of left eye (7/27/2012); Stroke (8/1/2003); Type 2 diabetes mellitus with diabetic polyneuropathy (7/27/2012); and Ulcer. The patient's chief complaint is long, thick toenailsThis patient has documented high risk feet requiring routine maintenance secondary to diabetes mellitis and those secondary complications of diabetes, as mentioned..       PCP: Lexy Cotton    Date Last Seen by PCP:   Chief Complaint   Patient presents with    PCP     Cody Villatoro MD 10/13/17    Diabetic Foot Exam    Nail Care       Chief Complaint   Patient presents with    PCP     Cody Villatoro MD 10/13/17    Diabetic Foot Exam    Nail Care       Current shoe gear: black leather DM shoes w/ custom inserts     Hemoglobin A1C   Date Value Ref Range Status   01/12/2018 10.4 (H) 4.0 - 5.6 % Final     Comment:     According to ADA guidelines, hemoglobin A1c <7.0% represents  optimal control in non-pregnant diabetic patients. Different  metrics may apply to specific patient populations.   Standards of Medical Care in Diabetes-2016.  For the purpose  of screening for the presence of diabetes:  <5.7%     Consistent with the absence of diabetes  5.7-6.4%  Consistent with increasing risk for diabetes   (prediabetes)  >or=6.5%  Consistent with diabetes  Currently, no consensus exists for use of hemoglobin A1c  for diagnosis of diabetes for children.  This Hemoglobin A1c assay has significant interference with fetal   hemoglobin   (HbF). The results are invalid for patients with abnormal amounts of   HbF,   including those with known Hereditary Persistence   of Fetal Hemoglobin. Heterozygous hemoglobin variants (HbAS, HbAC,   HbAD, HbAE, HbA2) do not significantly interfere with this assay;   however, presence of multiple variants in a sample may impact the %   interference.     10/06/2017 9.4 (H) 4.0 - 5.6 % Final     Comment:     According to ADA guidelines, hemoglobin A1c <7.0% represents  optimal control in non-pregnant diabetic patients. Different  metrics may apply to specific patient populations.   Standards of Medical Care in Diabetes-2016.  For the purpose of screening for the presence of diabetes:  <5.7%     Consistent with the absence of diabetes  5.7-6.4%  Consistent with increasing risk for diabetes   (prediabetes)  >or=6.5%  Consistent with diabetes  Currently, no consensus exists for use of hemoglobin A1c  for diagnosis of diabetes for children.  This Hemoglobin A1c assay has significant interference with fetal   hemoglobin   (HbF). The results are invalid for patients with abnormal amounts of   HbF,   including those with known Hereditary Persistence   of Fetal Hemoglobin. Heterozygous hemoglobin variants (HbAS, HbAC,   HbAD, HbAE, HbA2) do not significantly interfere with this assay;   however, presence of multiple variants in a sample may impact the %   interference.     06/30/2017 9.8 (H) 4.0 - 5.6 % Final     Comment:     According to ADA guidelines, hemoglobin A1c <7.0% represents  optimal control in non-pregnant diabetic patients.  Different  metrics may apply to specific patient populations.   Standards of Medical Care in Diabetes-2016.  For the purpose of screening for the presence of diabetes:  <5.7%     Consistent with the absence of diabetes  5.7-6.4%  Consistent with increasing risk for diabetes   (prediabetes)  >or=6.5%  Consistent with diabetes  Currently, no consensus exists for use of hemoglobin A1c  for diagnosis of diabetes for children.  This Hemoglobin A1c assay has significant interference with fetal   hemoglobin   (HbF). The results are invalid for patients with abnormal amounts of   HbF,   including those with known Hereditary Persistence   of Fetal Hemoglobin. Heterozygous hemoglobin variants (HbAS, HbAC,   HbAD, HbAE, HbA2) do not significantly interfere with this assay;   however, presence of multiple variants in a sample may impact the %   interference.         Review of Systems   Constitution: Negative for chills, decreased appetite and fever.   Cardiovascular: Negative for leg swelling.   Skin: Positive for dry skin and nail changes. Negative for flushing and itching.   Musculoskeletal: Positive for arthritis. Negative for back pain, gout, joint pain, joint swelling and myalgias.   Gastrointestinal: Negative for nausea and vomiting.   Neurological: Positive for numbness. Negative for loss of balance and paresthesias.           Objective:      Physical Exam   Constitutional: She is oriented to person, place, and time. She appears well-developed and well-nourished. No distress.   Cardiovascular:   Dorsalis pedis and posterior tibial pulses are diminished Bilaterally. Toes are cool to touch. Feet are warm proximally.There is decreased digital hair. Skin is atrophic, slightly hyperpigmented, and mildly edematous       Musculoskeletal: Normal range of motion. She exhibits no edema, tenderness or deformity.   Equinus noted b/l ankles, gastroc. Adequate joint range of motion without pain, limitation, nor crepitation Bilateral  pedal joints. Muscle strength is 5/5 in all groups bilaterally.        Neurological: She is alert and oriented to person, place, and time.   Dover-Vincent 5.07 monofilamant testing is diminished [place] feet. Sharp/dull sensation diminished Bilaterally. Light touch absent Bilaterally.       Skin: Skin is warm, dry and intact. No abrasion, no bruising, no burn, no ecchymosis, no laceration, no lesion, no petechiae and no rash noted. She is not diaphoretic. No pallor.   Nails 1-5 b/l  are elongated by  2-4mm's, thickened by 3-5 mm's, dystrophic, and are darkened in  coloration . Xerosis Bilaterally. No open lesions noted.      Hyperkeratotic tissue noted to distal 2nd toe b/l      Psychiatric: She has a normal mood and affect. Thought content normal.   Nursing note and vitals reviewed.            Assessment:       Encounter Diagnoses   Name Primary?    Type II diabetes mellitus with neurological manifestations Yes    Onychomycosis due to dermatophyte     Corn or callus          Plan:       Annika was seen today for pcp, diabetic foot exam and nail care.    Diagnoses and all orders for this visit:    Type II diabetes mellitus with neurological manifestations    Onychomycosis due to dermatophyte    Corn or callus      I counseled the patient on her conditions, their implications and medical management.        - Shoe inspection. Diabetic Foot Education. Patient reminded of the importance of good nutrition and blood sugar control to help prevent podiatric complications of diabetes. Patient instructed on proper foot hygeine. We discussed wearing proper shoe gear, daily foot inspections, never walking without protective shoe gear, never putting sharp instruments to feet, routine podiatric nail visits every 2-3 months.      - With patient's permission, nails were aggressively reduced and debrided x 10 to their soft tissue attachment mechanically and with electric , removing all offending nail and debris. Patient  relates relief following the procedure. She will continue to monitor the areas daily, inspect her feet, wear protective shoe gear when ambulatory, moisturizer to maintain skin integrity and follow in this office in approximately 2-3 months, sooner p.r.n.    - After cleansing the  area w/ alcohol prep pad the above mentioned hyperkeratosis was trimmed utilizing No 15 scapel, to a smooth base with out incident. Patient tolerated this  well and reported comfort to the area of distal 2nd toe b/l

## 2018-02-23 ENCOUNTER — PATIENT OUTREACH (OUTPATIENT)
Dept: DIABETES | Facility: CLINIC | Age: 68
End: 2018-02-23

## 2018-02-23 NOTE — PROGRESS NOTES
Logs reviewed by NIDHI Wilkerson NP and discussed with Dr. Locke. Insulin was adjusted to:  Increase her by 10 units across the board.   lg bfast 115 small bfast 105   lg lunch 85 small 80   lg dinner 50 small dinner 40     Called patient and instructed her of the changes. Patient verbalized understanding.

## 2018-02-26 ENCOUNTER — ANESTHESIA (OUTPATIENT)
Dept: ENDOSCOPY | Facility: HOSPITAL | Age: 68
End: 2018-02-26
Payer: MEDICARE

## 2018-02-26 ENCOUNTER — ANESTHESIA EVENT (OUTPATIENT)
Dept: ENDOSCOPY | Facility: HOSPITAL | Age: 68
End: 2018-02-26
Payer: MEDICARE

## 2018-02-26 ENCOUNTER — HOSPITAL ENCOUNTER (OUTPATIENT)
Facility: HOSPITAL | Age: 68
Discharge: HOME | End: 2018-02-26
Attending: INTERNAL MEDICINE | Admitting: INTERNAL MEDICINE
Payer: MEDICARE

## 2018-02-26 ENCOUNTER — SURGERY (OUTPATIENT)
Age: 68
End: 2018-02-26

## 2018-02-26 VITALS
OXYGEN SATURATION: 99 % | SYSTOLIC BLOOD PRESSURE: 118 MMHG | BODY MASS INDEX: 48.82 KG/M2 | RESPIRATION RATE: 16 BRPM | TEMPERATURE: 98 F | HEIGHT: 65 IN | WEIGHT: 293 LBS | DIASTOLIC BLOOD PRESSURE: 75 MMHG | HEART RATE: 72 BPM

## 2018-02-26 DIAGNOSIS — D50.9 IRON DEFICIENCY ANEMIA: ICD-10-CM

## 2018-02-26 DIAGNOSIS — D50.9 IRON DEFICIENCY ANEMIA, UNSPECIFIED IRON DEFICIENCY ANEMIA TYPE: Primary | ICD-10-CM

## 2018-02-26 LAB
POCT GLUCOSE: 156 MG/DL (ref 70–110)
POCT GLUCOSE: 181 MG/DL (ref 70–110)

## 2018-02-26 PROCEDURE — 37000009 HC ANESTHESIA EA ADD 15 MINS: Performed by: INTERNAL MEDICINE

## 2018-02-26 PROCEDURE — 00813 ANES UPR LWR GI NDSC PX: CPT | Performed by: INTERNAL MEDICINE

## 2018-02-26 PROCEDURE — D9220A PRA ANESTHESIA: Mod: ANES,,, | Performed by: ANESTHESIOLOGY

## 2018-02-26 PROCEDURE — 88305 TISSUE EXAM BY PATHOLOGIST: CPT | Mod: 59 | Performed by: PATHOLOGY

## 2018-02-26 PROCEDURE — 82962 GLUCOSE BLOOD TEST: CPT | Mod: 91 | Performed by: INTERNAL MEDICINE

## 2018-02-26 PROCEDURE — 82962 GLUCOSE BLOOD TEST: CPT | Performed by: INTERNAL MEDICINE

## 2018-02-26 PROCEDURE — 25000003 PHARM REV CODE 250: Performed by: INTERNAL MEDICINE

## 2018-02-26 PROCEDURE — 45385 COLONOSCOPY W/LESION REMOVAL: CPT | Performed by: INTERNAL MEDICINE

## 2018-02-26 PROCEDURE — D9220A PRA ANESTHESIA: Mod: CRNA,,, | Performed by: NURSE ANESTHETIST, CERTIFIED REGISTERED

## 2018-02-26 PROCEDURE — 37000008 HC ANESTHESIA 1ST 15 MINUTES: Performed by: INTERNAL MEDICINE

## 2018-02-26 PROCEDURE — 45385 COLONOSCOPY W/LESION REMOVAL: CPT | Mod: ,,, | Performed by: INTERNAL MEDICINE

## 2018-02-26 PROCEDURE — 43239 EGD BIOPSY SINGLE/MULTIPLE: CPT | Performed by: INTERNAL MEDICINE

## 2018-02-26 PROCEDURE — 27201012 HC FORCEPS, HOT/COLD, DISP: Performed by: INTERNAL MEDICINE

## 2018-02-26 PROCEDURE — 88342 IMHCHEM/IMCYTCHM 1ST ANTB: CPT | Mod: 26,,, | Performed by: PATHOLOGY

## 2018-02-26 PROCEDURE — 25000003 PHARM REV CODE 250: Performed by: NURSE ANESTHETIST, CERTIFIED REGISTERED

## 2018-02-26 PROCEDURE — 63600175 PHARM REV CODE 636 W HCPCS: Performed by: NURSE ANESTHETIST, CERTIFIED REGISTERED

## 2018-02-26 PROCEDURE — 27201089 HC SNARE, DISP (ANY): Performed by: INTERNAL MEDICINE

## 2018-02-26 PROCEDURE — 43239 EGD BIOPSY SINGLE/MULTIPLE: CPT | Mod: 51,,, | Performed by: INTERNAL MEDICINE

## 2018-02-26 RX ORDER — SODIUM CHLORIDE 9 MG/ML
INJECTION, SOLUTION INTRAVENOUS CONTINUOUS
Status: DISCONTINUED | OUTPATIENT
Start: 2018-02-26 | End: 2018-02-26 | Stop reason: HOSPADM

## 2018-02-26 RX ORDER — PROPOFOL 10 MG/ML
VIAL (ML) INTRAVENOUS CONTINUOUS PRN
Status: DISCONTINUED | OUTPATIENT
Start: 2018-02-26 | End: 2018-02-26

## 2018-02-26 RX ORDER — LIDOCAINE HCL/PF 100 MG/5ML
SYRINGE (ML) INTRAVENOUS
Status: DISCONTINUED | OUTPATIENT
Start: 2018-02-26 | End: 2018-02-26

## 2018-02-26 RX ORDER — FENTANYL CITRATE 50 UG/ML
INJECTION, SOLUTION INTRAMUSCULAR; INTRAVENOUS
Status: DISCONTINUED | OUTPATIENT
Start: 2018-02-26 | End: 2018-02-26

## 2018-02-26 RX ORDER — PROPOFOL 10 MG/ML
VIAL (ML) INTRAVENOUS
Status: DISCONTINUED | OUTPATIENT
Start: 2018-02-26 | End: 2018-02-26

## 2018-02-26 RX ADMIN — PROPOFOL 125 MCG/KG/MIN: 10 INJECTION, EMULSION INTRAVENOUS at 02:02

## 2018-02-26 RX ADMIN — SODIUM CHLORIDE, SODIUM GLUCONATE, SODIUM ACETATE, POTASSIUM CHLORIDE, MAGNESIUM CHLORIDE, SODIUM PHOSPHATE, DIBASIC, AND POTASSIUM PHOSPHATE: .53; .5; .37; .037; .03; .012; .00082 INJECTION, SOLUTION INTRAVENOUS at 02:02

## 2018-02-26 RX ADMIN — PROPOFOL 30 MG: 10 INJECTION, EMULSION INTRAVENOUS at 02:02

## 2018-02-26 RX ADMIN — FENTANYL CITRATE 100 MCG: 50 INJECTION, SOLUTION INTRAMUSCULAR; INTRAVENOUS at 02:02

## 2018-02-26 RX ADMIN — SODIUM CHLORIDE: 9 INJECTION, SOLUTION INTRAVENOUS at 08:02

## 2018-02-26 RX ADMIN — LIDOCAINE HYDROCHLORIDE 100 MG: 20 INJECTION, SOLUTION INTRAVENOUS at 02:02

## 2018-02-26 NOTE — ANESTHESIA PREPROCEDURE EVALUATION
02/26/2018  Annika Mac is a 67 y.o., female with a pre-operative diagnosis of Iron deficiency anemia, unspecified iron deficiency anemia type [D50.9] who is scheduled for Procedure(s) (LRB):  ESOPHAGOGASTRODUODENOSCOPY (EGD) (N/A)  COLONOSCOPY (N/A).     Requested anesthesia type: General  Surgeon: Brandt Lopez MD  Allergies:   Review of patient's allergies indicates:   Allergen Reactions    Iodinated contrast- oral and iv dye Rash     Vital Sign Range:    Chronic Medications:   Prescriptions Prior to Admission   Medication Sig Dispense Refill Last Dose    albuterol 90 mcg/actuation inhaler Inhale 2 puffs into the lungs every 6 (six) hours as needed for Wheezing. 1 Inhaler 0 Taking    allopurinol (ZYLOPRIM) 100 MG tablet TAKE 1 TABLET BY MOUTH ONCE DAILY 30 tablet 0 Taking    amLODIPine (NORVASC) 5 MG tablet Take 1 tablet (5 mg total) by mouth once daily. 90 tablet 1 Taking    aspirin 81 MG Chew Take 1 tablet (81 mg total) by mouth once daily.  0 Taking    azelastine (ASTELIN) 137 mcg (0.1 %) nasal spray 1 spray (137 mcg total) by Nasal route 2 (two) times daily. 30 mL 11 Taking    blood-glucose meter kit Use as instructed, true test/result meter 1 each 0 Taking    colchicine 0.6 mg tablet Take 1 tablet (0.6 mg total) by mouth once daily. 30 tablet 3 Taking    DULoxetine (CYMBALTA) 30 MG capsule Take 1 capsule (30 mg total) by mouth once daily. 90 capsule 1 Taking    ferrous sulfate 325 mg (65 mg iron) Tab tablet Take 1 tablet (325 mg total) by mouth 2 (two) times daily.  0 Taking    fexofenadine (ALLEGRA) 180 MG tablet TAKE 1 TABLET BY MOUTH ONCE DAILY 30 tablet 0 Taking    fluticasone (FLONASE) 50 mcg/actuation nasal spray 2 sprays by Each Nare route once daily. 48 g 6 Taking    furosemide (LASIX) 20 MG tablet Take 1 tablet (20 mg total) by mouth once daily. 90 tablet 2 Taking     "hydrocodone-acetaminophen 10-325mg (NORCO)  mg Tab Take 1 tablet by mouth every 8 (eight) hours as needed for Pain. 90 tablet 0     [START ON 3/21/2018] hydrocodone-acetaminophen 10-325mg (NORCO)  mg Tab Take 1 tablet by mouth every 8 (eight) hours as needed for Pain. 90 tablet 0     [START ON 4/21/2018] hydrocodone-acetaminophen 10-325mg (NORCO)  mg Tab Take 1 tablet by mouth every 8 (eight) hours as needed for Pain. 90 tablet 0     insulin regular hum U-500 conc (HUMULIN R U-500, CONC, KWIKPEN) 500 unit/mL (3 mL) InPn Inject  Units into the skin 3 (three) times daily before meals. Take 100u with breakfast, 70u with lunch, and 35u with dinner 11 Syringe 3     irbesartan (AVAPRO) 300 MG tablet Take 0.5 tablets (150 mg total) by mouth every evening. 90 tablet 2 Taking    labetalol (NORMODYNE) 300 MG tablet Take 1 tablet (300 mg total) by mouth 2 (two) times daily. 180 tablet 2 Taking    lancets 31 gauge Misc 1 lancet by Misc.(Non-Drug; Combo Route) route 4 (four) times daily. 150 each 6 Taking    levocetirizine (XYZAL) 5 MG tablet Take 1 tablet (5 mg total) by mouth every evening. 7 tablet 0 Taking    montelukast (SINGULAIR) 10 mg tablet TAKE 1 TABLET(10 MG) BY MOUTH EVERY EVENING 90 tablet 0 Taking    nystatin (MYCOSTATIN) powder Apply topically 3 (three) times daily. 60 g 2 Taking    pantoprazole (PROTONIX) 40 MG tablet Take 1 tablet (40 mg total) by mouth once daily. 90 tablet 3 Taking    pen needle, diabetic 32 gauge x 5/32" Ndle Use with multiple daily insulin injections 100 each 12 Taking    pregabalin (LYRICA) 100 MG capsule TAKE 1 CAPSULE BY MOUTH TWO TIMES DAILY 180 capsule 1 Taking    simvastatin (ZOCOR) 40 MG tablet Take 0.5 tablets (20 mg total) by mouth every evening. 90 tablet 4 Taking    SITagliptin (JANUVIA) 50 MG Tab Take 1 tablet (50 mg total) by mouth once daily. 90 tablet 4     spironolactone (ALDACTONE) 25 MG tablet Take 0.5 tablets (12.5 mg total) by mouth " once daily. 90 tablet 0 Taking    TRUETEST TEST STRIPS Strp 1 strip by Misc.(Non-Drug; Combo Route) route 4 (four) times daily. 400 strip 4 Taking     Current Medications:   No current facility-administered medications for this encounter.      Medical History:   Past Medical History:   Diagnosis Date    Allergy     Anemia 7/27/2012    Arthritis     CHF (congestive heart failure)     CKD (chronic kidney disease) stage 3, GFR 30-59 ml/min 7/27/2012    Clotting disorder     Deep vein thrombophlebitis of left leg 7/27/2012    Degenerative disc disease     Diabetic peripheral neuropathy associated with type 2 diabetes mellitus 7/27/2012    GERD (gastroesophageal reflux disease)     HTN (hypertension) 7/27/2012    Hyperlipidemia 7/27/2012    Lead-induced gout of ankle or foot     right going on three weeks    Nonproliferative diabetic retinopathy of right eye 7/27/2012    Obstructive sleep apnea 7/27/2012    Osteoporosis     Proliferative diabetic retinopathy of left eye 7/27/2012    Stroke 8/1/2003    Type 2 diabetes mellitus with diabetic polyneuropathy 7/27/2012    Ulcer      .    Anesthesia Evaluation    I have reviewed the Patient Summary Reports.    I have reviewed the Nursing Notes.   I have reviewed the Medications.     Review of Systems  Anesthesia Hx:  No problems with previous Anesthesia  Denies Family Hx of Anesthesia complications.   Denies Personal Hx of Anesthesia complications.   Cardiovascular:   Hypertension CHF    Pulmonary:   Sleep Apnea, CPAP    Renal/:   Chronic Renal Disease    Hepatic/GI:   GERD, poorly controlled    Musculoskeletal:   Arthritis     Neurological:   CVA Neuromuscular Disease,    Endocrine:   Diabetes        Physical Exam  General:  Morbid Obesity    Airway/Jaw/Neck:  AIRWAY FINDINGS: Normal      Eyes/Ears/Nose:  EYES/EARS/NOSE FINDINGS: Normal   Dental:  Dental Findings: (upper partial) In tact, upper partial dentures   Chest/Lungs:  Chest/Lungs Findings:  Clear to auscultation, Normal Respiratory Rate, Decreased Breath Sounds Bilateral     Heart/Vascular:  Heart Findings: Rate: Normal  Rhythm: Regular Rhythm  Sounds: Normal  Heart murmur: negative    Abdomen:  Abdomen Findings:  Normal, Soft, Nontender       Mental Status:  Mental Status Findings: Normal        Anesthesia Plan  Type of Anesthesia, risks & benefits discussed:  Anesthesia Type:  general  Patient's Preference: as indicated  Intra-op Monitoring Plan: standard ASA monitors  Intra-op Monitoring Plan Comments:   Post Op Pain Control Plan:   Post Op Pain Control Plan Comments:   Induction:   IV  Beta Blocker:  Patient is on a Beta-Blocker and has received one dose within the past 24 hours (No further documentation required).       Informed Consent: Patient understands risks and agrees with Anesthesia plan.  Questions answered. Anesthesia consent signed with patient.  ASA Score: 3     Day of Surgery Review of History & Physical:  There are no significant changes.  H&P update referred to the provider.     Anesthesia Plan Notes: Risk of dental damage, agrees to place partial in teeth cup. Reassurance given.        Ready For Surgery From Anesthesia Perspective.

## 2018-02-26 NOTE — ANESTHESIA RELEASE NOTE
Post Anesthetic Evaluation    Patient: Annika Mac    Procedure(s) Performed: Procedure(s) (LRB):  ESOPHAGOGASTRODUODENOSCOPY (EGD) (N/A)  COLONOSCOPY (N/A)    Anesthesia type: GA    Patient location: PACU    Post pain: Adequate analgesia    Post assessment: no apparent anesthetic complications    Last Vitals:   Vitals:    02/26/18 1610   BP: 118/75   Pulse: 72   Resp: 16   Temp: 36.7 °C (98.1 °F)       Post vital signs: stable    Level of consciousness: awake    Complications: none    Follow-up Needed: No

## 2018-02-26 NOTE — H&P
Short Stay Endoscopy History and Physical    PCP - Lexy Cotton    Procedure - EGD/Colonoscopy  Sedation: GA  ASA - per anesthesia  Mallampati - per anesthesia  History of Anesthesia problems - no  Family history Anesthesia problems -  no     HPI:  This is a 67 y.o. female here for evaluation of : Iron deficiency anemia, Occ solid food dysphagia    Reflux - yes  Dysphagia - occ  Abdominal pain - no  Diarrhea - no    ROS:  Constitutional: No fevers, chills, No weight loss  ENT: No allergies  CV: No chest pain  Pulm: No cough, No shortness of breath  Ophtho: No vision changes  GI: see HPI      Medical History:  has a past medical history of Allergy; Anemia (2012); Arthritis; CHF (congestive heart failure); CKD (chronic kidney disease) stage 3, GFR 30-59 ml/min (2012); Clotting disorder; Deep vein thrombophlebitis of left leg (2012); Degenerative disc disease; Diabetic peripheral neuropathy associated with type 2 diabetes mellitus (2012); GERD (gastroesophageal reflux disease); HTN (hypertension) (2012); Hyperlipidemia (2012); Lead-induced gout of ankle or foot; Nonproliferative diabetic retinopathy of right eye (2012); Obstructive sleep apnea (2012); Osteoporosis; Proliferative diabetic retinopathy of left eye (2012); Stroke (2003); Type 2 diabetes mellitus with diabetic polyneuropathy (2012); and Ulcer.    Surgical History:  has a past surgical history that includes Total knee arthroplasty (2006); Total abdominal hysterectomy (); Ganglion cyst excision (2007); Cyst Removal (2011); Trigger finger release (2009);  section; Cataract extraction w/  intraocular lens implant (Left, 3/2002); Cataract extraction w/  intraocular lens implant (Right, ); Pan Retinal Photocoagulation (Bilateral); and Eye surgery (Bilateral, ).    Family History: family history includes Diabetes in her brother, mother, and sister; Heart disease in  her father; Hypertension in her brother and mother; Thyroid disease in her brother.. Otherwise no colon cancer, inflammatory bowel disease, or GI malignancies.    Social History:  reports that she has never smoked. She has never used smokeless tobacco. She reports that she does not drink alcohol or use drugs.    Review of patient's allergies indicates:   Allergen Reactions    Iodinated contrast- oral and iv dye Rash       Medications:   Prescriptions Prior to Admission   Medication Sig Dispense Refill Last Dose    albuterol 90 mcg/actuation inhaler Inhale 2 puffs into the lungs every 6 (six) hours as needed for Wheezing. 1 Inhaler 0 Taking    allopurinol (ZYLOPRIM) 100 MG tablet TAKE 1 TABLET BY MOUTH ONCE DAILY 30 tablet 0 Taking    amLODIPine (NORVASC) 5 MG tablet Take 1 tablet (5 mg total) by mouth once daily. 90 tablet 1 Taking    aspirin 81 MG Chew Take 1 tablet (81 mg total) by mouth once daily.  0 Taking    azelastine (ASTELIN) 137 mcg (0.1 %) nasal spray 1 spray (137 mcg total) by Nasal route 2 (two) times daily. 30 mL 11 Taking    blood-glucose meter kit Use as instructed, true test/result meter 1 each 0 Taking    colchicine 0.6 mg tablet Take 1 tablet (0.6 mg total) by mouth once daily. 30 tablet 3 Taking    DULoxetine (CYMBALTA) 30 MG capsule Take 1 capsule (30 mg total) by mouth once daily. 90 capsule 1 Taking    ferrous sulfate 325 mg (65 mg iron) Tab tablet Take 1 tablet (325 mg total) by mouth 2 (two) times daily.  0 Taking    fexofenadine (ALLEGRA) 180 MG tablet TAKE 1 TABLET BY MOUTH ONCE DAILY 30 tablet 0 Taking    fluticasone (FLONASE) 50 mcg/actuation nasal spray 2 sprays by Each Nare route once daily. 48 g 6 Taking    furosemide (LASIX) 20 MG tablet Take 1 tablet (20 mg total) by mouth once daily. 90 tablet 2 Taking    hydrocodone-acetaminophen 10-325mg (NORCO)  mg Tab Take 1 tablet by mouth every 8 (eight) hours as needed for Pain. 90 tablet 0     [START ON 3/21/2018]  "hydrocodone-acetaminophen 10-325mg (NORCO)  mg Tab Take 1 tablet by mouth every 8 (eight) hours as needed for Pain. 90 tablet 0     [START ON 4/21/2018] hydrocodone-acetaminophen 10-325mg (NORCO)  mg Tab Take 1 tablet by mouth every 8 (eight) hours as needed for Pain. 90 tablet 0     insulin regular hum U-500 conc (HUMULIN R U-500, CONC, KWIKPEN) 500 unit/mL (3 mL) InPn Inject  Units into the skin 3 (three) times daily before meals. Take 100u with breakfast, 70u with lunch, and 35u with dinner 11 Syringe 3     irbesartan (AVAPRO) 300 MG tablet Take 0.5 tablets (150 mg total) by mouth every evening. 90 tablet 2 Taking    labetalol (NORMODYNE) 300 MG tablet Take 1 tablet (300 mg total) by mouth 2 (two) times daily. 180 tablet 2 Taking    lancets 31 gauge Misc 1 lancet by Misc.(Non-Drug; Combo Route) route 4 (four) times daily. 150 each 6 Taking    levocetirizine (XYZAL) 5 MG tablet Take 1 tablet (5 mg total) by mouth every evening. 7 tablet 0 Taking    montelukast (SINGULAIR) 10 mg tablet TAKE 1 TABLET(10 MG) BY MOUTH EVERY EVENING 90 tablet 0 Taking    nystatin (MYCOSTATIN) powder Apply topically 3 (three) times daily. 60 g 2 Taking    pantoprazole (PROTONIX) 40 MG tablet Take 1 tablet (40 mg total) by mouth once daily. 90 tablet 3 Taking    pen needle, diabetic 32 gauge x 5/32" Ndle Use with multiple daily insulin injections 100 each 12 Taking    pregabalin (LYRICA) 100 MG capsule TAKE 1 CAPSULE BY MOUTH TWO TIMES DAILY 180 capsule 1 Taking    simvastatin (ZOCOR) 40 MG tablet Take 0.5 tablets (20 mg total) by mouth every evening. 90 tablet 4 Taking    SITagliptin (JANUVIA) 50 MG Tab Take 1 tablet (50 mg total) by mouth once daily. 90 tablet 4     spironolactone (ALDACTONE) 25 MG tablet Take 0.5 tablets (12.5 mg total) by mouth once daily. 90 tablet 0 Taking    TRUETEST TEST STRIPS Strp 1 strip by Misc.(Non-Drug; Combo Route) route 4 (four) times daily. 400 strip 4 Taking "       Objective Findings:    Vital Signs: Per nursing notes.    Physical Exam:  General Appearance: Well appearing in no acute distress  Head:   Normocephalic, without obvious abnormality  Eyes:    No scleral icterus  Airway: Open  Neck: No restriction in mobility  Lungs: CTA bilaterally in anterior and posterior fields, no wheezes, no crackles.  Heart:  Regular rate and rhythm, S1, S2 normal, no murmurs heard  Abdomen: Soft, non tender, non distended      Labs:  Lab Results   Component Value Date    WBC 5.24 11/16/2017    HGB 9.9 (L) 11/16/2017    HCT 31.7 (L) 11/16/2017     11/16/2017    CHOL 146 06/30/2017    TRIG 149 06/30/2017    HDL 39 (L) 06/30/2017    ALT 14 11/16/2017    AST 18 11/16/2017     12/20/2017    K 5.0 12/20/2017     12/20/2017    CREATININE 1.8 (H) 12/20/2017    BUN 25 (H) 12/20/2017    CO2 25 12/20/2017    TSH 1.591 06/30/2017    INR 1.0 03/10/2006    HGBA1C 10.4 (H) 01/12/2018         I have explained the risks and benefits of endoscopy procedures to the patient including but not limited to bleeding, perforation, infection, and death.    Thank you so much for allowing me to participate in the care of Annika Lopez MD

## 2018-02-26 NOTE — ANESTHESIA POSTPROCEDURE EVALUATION
"Anesthesia Post Evaluation    Patient: Annika Mac    Procedure(s) Performed: Procedure(s) (LRB):  ESOPHAGOGASTRODUODENOSCOPY (EGD) (N/A)  COLONOSCOPY (N/A)    Final Anesthesia Type: general  Patient location during evaluation: PACU  Patient participation: Yes- Able to Participate  Level of consciousness: awake and alert  Post-procedure vital signs: reviewed and stable  Pain management: adequate  Airway patency: patent  PONV status at discharge: No PONV  Anesthetic complications: no      Cardiovascular status: blood pressure returned to baseline  Respiratory status: spontaneous ventilation and room air  Hydration status: euvolemic  Follow-up not needed.        Visit Vitals  /75   Pulse 72   Temp 36.7 °C (98.1 °F) (Temporal)   Resp 16   Ht 5' 5" (1.651 m)   Wt (!) 138.8 kg (306 lb)   SpO2 99%   Breastfeeding? No   BMI 50.92 kg/m²       Pain/Zechariah Score: Pain Assessment Performed: Yes (2/26/2018  4:16 PM)  Presence of Pain: denies (2/26/2018  4:16 PM)      "

## 2018-02-26 NOTE — PROVATION PATIENT INSTRUCTIONS
Discharge Summary/Instructions after an Endoscopic Procedure  Patient Name: Annika Mac  Patient MRN: [id number]  Patient YOB: 1950 Monday, February 26, 2018  Faizan Mac MD  RESTRICTIONS:  During your procedure today, you received medications for sedation.  These   medications may affect your judgment, balance and coordination.  Therefore,   for 24 hours, you have the following restrictions:   - DO NOT drive a car, operate machinery, make legal/financial decisions,   sign important papers or drink alcohol.    ACTIVITY:  The following day: return to full activity including work, except no heavy   lifting, straining or running for 3 days if polyps were removed.  DIET:  Eat and drink normally unless instructed otherwise.     TREATMENT FOR COMMON SIDE EFFECTS:  - Mild abdominal pain, nausea, belching, bloating or excessive gas:  rest,   eat lightly and use a heating pad.  - Sore Throat: treat with throat lozenges and/or gargle with warm salt   water.  - Because air was used during the procedure, expelling large amounts of air   from your rectum or belching is normal.  - If a bowel prep was taken, you may not have a bowel movement for 1-3 days.    This is normal.  SYMPTOMS TO WATCH FOR AND REPORT TO YOUR PHYSICIAN:  1. Abdominal pain or bloating, other than gas cramps.  2. Chest pain.  3. Back pain.  4. Signs of infection such as: chills or fever occurring within 24 hours   after the procedure.  5. Rectal bleeding, which would show as bright red, maroon, or black stools.   (A tablespoon of blood from the rectum is not serious, especially if   hemorrhoids are present.)  6. Vomiting.  7. Weakness or dizziness.  GO DIRECTLY TO THE NEAREST EMERGENCY ROOM IF YOU HAVE ANY OF THE FOLLOWING:      Difficulty breathing  Chills and/or fever over 101 F   Persistent vomiting and/or vomiting blood   Severe abdominal pain   Severe chest pain   Black, tarry stools   Bleeding- more than one tablespoon   Any other symptom or  condition that you feel may need urgent attention  Your doctor recommends these additional instructions:  If any biopsies were taken, your doctors clinic will contact you in 1 to 2   weeks with any results.  You have a contact number available for emergencies.  The signs and symptoms   of potential delayed complications were discussed with you.  You may return   to normal activities tomorrow.  Written discharge instructions were   provided to you.   You are being discharged to home.   Resume your previous diet.   Continue your present medications.   We are waiting for your pathology results.   Your physician has recommended a repeat colonoscopy in five years for   surveillance.   Return to your referring physician after studies are complete.  For questions, problems or results please call your physician - Faizan Mac MD at Work:  [phone].  [facility], EMERGENCY ROOM PHONE NUMBER: [phone]  IF A COMPLICATION OR EMERGENCY SITUATION ARISES AND YOU ARE UNABLE TO REACH   YOUR PHYSICIAN - GO DIRECTLY TO THE EMERGENCY ROOM.  Faizan Mac MD  2/26/2018 2:47:30 PM  This report has been verified and signed electronically.

## 2018-02-26 NOTE — TRANSFER OF CARE
"Anesthesia Transfer of Care Note    Patient: Annika Mac    Procedure(s) Performed: Procedure(s) (LRB):  ESOPHAGOGASTRODUODENOSCOPY (EGD) (N/A)  COLONOSCOPY (N/A)    Patient location: [facility]    Anesthesia Type: general    Transport from OR: Transported from OR on 2-3 L/min O2 by NC with adequate spontaneous ventilation    Post pain: adequate analgesia    Post assessment: no apparent anesthetic complications and tolerated procedure well    Post vital signs: stable    Level of consciousness: awake    Nausea/Vomiting: no nausea/vomiting    Complications: none    Transfer of care protocol was followed      Last vitals:   Visit Vitals  /77   Pulse 72   Temp 36.7 °C (98.1 °F) (Temporal)   Resp 16   Ht 5' 5" (1.651 m)   Wt (!) 138.8 kg (306 lb)   SpO2 100%   Breastfeeding? No   BMI 50.92 kg/m²     "

## 2018-02-26 NOTE — PROVATION PATIENT INSTRUCTIONS
Discharge Summary/Instructions after an Endoscopic Procedure  Patient Name: Annika Mac  Patient MRN: [id number]  Patient YOB: 1950 Monday, February 26, 2018  Faizan Mac MD  RESTRICTIONS:  During your procedure today, you received medications for sedation.  These   medications may affect your judgment, balance and coordination.  Therefore,   for 24 hours, you have the following restrictions:   - DO NOT drive a car, operate machinery, make legal/financial decisions,   sign important papers or drink alcohol.    ACTIVITY:  The following day: return to full activity including work, except no heavy   lifting, straining or running for 3 days if polyps were removed.  DIET:  Eat and drink normally unless instructed otherwise.     TREATMENT FOR COMMON SIDE EFFECTS:  - Mild abdominal pain, nausea, belching, bloating or excessive gas:  rest,   eat lightly and use a heating pad.  - Sore Throat: treat with throat lozenges and/or gargle with warm salt   water.  - Because air was used during the procedure, expelling large amounts of air   from your rectum or belching is normal.  - If a bowel prep was taken, you may not have a bowel movement for 1-3 days.    This is normal.  SYMPTOMS TO WATCH FOR AND REPORT TO YOUR PHYSICIAN:  1. Abdominal pain or bloating, other than gas cramps.  2. Chest pain.  3. Back pain.  4. Signs of infection such as: chills or fever occurring within 24 hours   after the procedure.  5. Rectal bleeding, which would show as bright red, maroon, or black stools.   (A tablespoon of blood from the rectum is not serious, especially if   hemorrhoids are present.)  6. Vomiting.  7. Weakness or dizziness.  GO DIRECTLY TO THE NEAREST EMERGENCY ROOM IF YOU HAVE ANY OF THE FOLLOWING:      Difficulty breathing  Chills and/or fever over 101 F   Persistent vomiting and/or vomiting blood   Severe abdominal pain   Severe chest pain   Black, tarry stools   Bleeding- more than one tablespoon   Any other symptom or  condition that you feel may need urgent attention  Your doctor recommends these additional instructions:  If any biopsies were taken, your doctors clinic will contact you in 1 to 2   weeks with any results.  You have a contact number available for emergencies.  The signs and symptoms   of potential delayed complications were discussed with you.  You may return   to normal activities tomorrow.  Written discharge instructions were   provided to you.   You are being discharged to home.   Resume your previous diet.   Continue your present medications.   We are waiting for your pathology results.   Return to your referring physician after studies are complete.  For questions, problems or results please call your physician - Faizan Mac MD at Work:  [phone].  [facility], EMERGENCY ROOM PHONE NUMBER: [phone]  IF A COMPLICATION OR EMERGENCY SITUATION ARISES AND YOU ARE UNABLE TO REACH   YOUR PHYSICIAN - GO DIRECTLY TO THE EMERGENCY ROOM.  Faizan Mac MD  2/26/2018 2:22:20 PM  This report has been verified and signed electronically.

## 2018-02-27 NOTE — TELEPHONE ENCOUNTER
Spoke with the pt and verified a dose she is taking now. Pt says Ursula changed  it on Feb 24th her direction. Please sent a new Rx to pharmacy.  Changed already in the Rx. Please advise.

## 2018-02-27 NOTE — TELEPHONE ENCOUNTER
----- Message from Jessi Paulino sent at 2/27/2018 10:37 AM CST -----  Contact: Self [phone]  ..Medication question / problems.  Dosage for insulin regular hum U-500 conc (HUMULIN R U-500, CONC, KWIKPEN) 500 unit/mL (3 mL) InPn needs to be clarified with [facility].

## 2018-03-01 ENCOUNTER — TELEPHONE (OUTPATIENT)
Dept: GASTROENTEROLOGY | Facility: CLINIC | Age: 68
End: 2018-03-01

## 2018-03-01 ENCOUNTER — TELEPHONE (OUTPATIENT)
Dept: ENDOCRINOLOGY | Facility: CLINIC | Age: 68
End: 2018-03-01

## 2018-03-01 NOTE — TELEPHONE ENCOUNTER
----- Message from Faizan Mac MD sent at 3/1/2018  3:09 PM CST -----  Please call, biopsies were ok, polyp was benign

## 2018-03-01 NOTE — TELEPHONE ENCOUNTER
Spoke with the [facility] rep for verified and label a Rx for pt and also verified a 90 days supply.

## 2018-03-01 NOTE — TELEPHONE ENCOUNTER
----- Message from Allegra Coello sent at 3/1/2018  9:08 AM CST -----  Contact: rx- [phone]- ref # [id number] open till 3:30 m-f  insulin regular hum U-500 conc (HUMULIN R U-500, CONC, KWIKPEN) 500 unit/mL (3 mL) InPn  Need to clarify direction Pt states:  Breakfast- 105-115  Lunch-80-85  Sbghvo-07-34  Also needs Quantity for 90 days is 16 packs      ..  [facility] - [place], [place] - [address] [place] [id number]  Phone: [phone] Fax: [phone]

## 2018-03-01 NOTE — TELEPHONE ENCOUNTER
----- Message from Brandt Lopez MD sent at 3/1/2018  3:14 PM CST -----  Please schedule a 6 month follow up with NP/PA for chronic anemia.

## 2018-03-01 NOTE — TELEPHONE ENCOUNTER
Ma called pt , mrs. Espinoza already spoke with pt recall letter generated for patient to see NP or PA for anemia in 6 months

## 2018-03-06 ENCOUNTER — TELEPHONE (OUTPATIENT)
Dept: HEMATOLOGY/ONCOLOGY | Facility: CLINIC | Age: 68
End: 2018-03-06

## 2018-03-06 NOTE — TELEPHONE ENCOUNTER
----- Message from Naun Hicks sent at 3/5/2018  3:46 PM CST -----  Contact: Pt   Will like to schedule f/u appt with dr andre on 4/4 due to spouse driving     Pt will like to schedule appt for 11am     Contact:: [phone]

## 2018-03-06 NOTE — TELEPHONE ENCOUNTER
----- Message from Verona Tariq sent at 3/6/2018  9:30 AM CST -----  Contact: PT  Pt is returning your call and can be reached at [phone].    Thank you

## 2018-03-07 ENCOUNTER — OFFICE VISIT (OUTPATIENT)
Dept: NEPHROLOGY | Facility: CLINIC | Age: 68
End: 2018-03-07
Payer: MEDICARE

## 2018-03-07 VITALS
SYSTOLIC BLOOD PRESSURE: 120 MMHG | BODY MASS INDEX: 48.82 KG/M2 | HEART RATE: 72 BPM | HEIGHT: 65 IN | OXYGEN SATURATION: 98 % | DIASTOLIC BLOOD PRESSURE: 58 MMHG | WEIGHT: 293 LBS

## 2018-03-07 DIAGNOSIS — N18.4 ANEMIA OF CHRONIC RENAL FAILURE, STAGE 4 (SEVERE): ICD-10-CM

## 2018-03-07 DIAGNOSIS — N25.81 SECONDARY HYPERPARATHYROIDISM: ICD-10-CM

## 2018-03-07 DIAGNOSIS — I10 ESSENTIAL HYPERTENSION: ICD-10-CM

## 2018-03-07 DIAGNOSIS — N18.4 TYPE 2 DM WITH CKD STAGE 4 AND HYPERTENSION: Primary | ICD-10-CM

## 2018-03-07 DIAGNOSIS — E55.9 VITAMIN D INSUFFICIENCY: ICD-10-CM

## 2018-03-07 DIAGNOSIS — E78.5 DYSLIPIDEMIA: Chronic | ICD-10-CM

## 2018-03-07 DIAGNOSIS — E66.01 MORBID OBESITY DUE TO EXCESS CALORIES: Chronic | ICD-10-CM

## 2018-03-07 DIAGNOSIS — E11.22 TYPE 2 DM WITH CKD STAGE 4 AND HYPERTENSION: Primary | ICD-10-CM

## 2018-03-07 DIAGNOSIS — D63.1 ANEMIA OF CHRONIC RENAL FAILURE, STAGE 4 (SEVERE): ICD-10-CM

## 2018-03-07 DIAGNOSIS — I12.9 TYPE 2 DM WITH CKD STAGE 4 AND HYPERTENSION: Primary | ICD-10-CM

## 2018-03-07 PROCEDURE — 99999 PR PBB SHADOW E&M-EST. PATIENT-LVL IV: CPT | Mod: PBBFAC,,, | Performed by: INTERNAL MEDICINE

## 2018-03-07 PROCEDURE — 99214 OFFICE O/P EST MOD 30 MIN: CPT | Mod: PBBFAC | Performed by: INTERNAL MEDICINE

## 2018-03-07 PROCEDURE — 99213 OFFICE O/P EST LOW 20 MIN: CPT | Mod: S$PBB,,, | Performed by: INTERNAL MEDICINE

## 2018-03-07 RX ORDER — FAMOTIDINE 20 MG/1
20 TABLET, FILM COATED ORAL 2 TIMES DAILY
Qty: 1 TABLET | Refills: 0 | Status: SHIPPED | OUTPATIENT
Start: 2018-03-07 | End: 2019-03-13 | Stop reason: SDUPTHER

## 2018-03-07 NOTE — PATIENT INSTRUCTIONS
Rfp, cbc, iron studies and ferritin today     rfp, cbc, pth, vit d ua uprct in 4 mo    potassium food list     Talk with your pcp about spots on fingers and pain in your legs     low K diet 2 gms daily    rtc 4 mo

## 2018-03-08 ENCOUNTER — TELEPHONE (OUTPATIENT)
Dept: NEPHROLOGY | Facility: CLINIC | Age: 68
End: 2018-03-08

## 2018-03-08 NOTE — TELEPHONE ENCOUNTER
----- Message from Ramonita Toledo MD sent at 3/7/2018  4:44 PM CST -----  Your kideny function is stable, but your blood count is still low and your kali stores are low normal, I will order one more blood test and if normal would then order iron infusion. Have this test with your next set of labs

## 2018-03-09 NOTE — PROGRESS NOTES
"CC: This 67 y.o. female presents for management of Diabetes Mellitus    HPI: Diagnosed with T2DM in July 1987 when c/o polydipsia and polyuria. Started oral agents then NPH and Regular insulin. Converted to MDI with Lantus and Novolog in 2/2006 after knee surgery. D/c TZD r/t weight gain 7/08 and added Symlin. Stopped Symlin 2/09. Januvia added 2/10. D/C Januvia 12/10 r/t cost; resumed though pt assistance in 2016. Converted to U500 in 7/10.   Received DM Education 1/22/08.   11/2013 she experienced severe hypoglycemia at home,  called 911, "nonresponsive" so brought to local ED for hypoglycemia BG 30s in 11/2013.     Medical conditions also include obesity, RUFINA, diastolic dysfunction, h/o stroke, PAD, and CKD 3.  Follows with cardiology, vascular medicine, and nephrology.   Also has chronic back pain, which limits activity and contributes to wt gain. Has followed with physical medicine.   Has chronic cough 2/2 GERD.   DM complicated with PDR with ME, PN.    Last saw me in 01/2018 and we increased her u500 regimen due to BG in the 400's.     She is again on U500 as Overall BGs markedly elevated.    Readings Check 3-4x daily.  AM: 175-350, 444  Lunch: 100-270, 395, 407  Dinner: 102-340, 440  HS: 180-300, 494          Basal only trial per PCP and MDI trial unsuccessful with marked elevation, likely r/t volume/absorption.     She has been working on her diet as well.   One episode of hypoglycemia. Ate a small dinner and gave herself 40 units of U500. Corrected.   Can recognize and treat symptoms.     DM ed reports to have good knowledge of CHO portions.     CURRENT DM MEDS: Januvia 50mg QD.  Humulin U500 pen: breakfast dose 115-105 U for smaller breakfast, lunch dose 85 ufor bigger lunch increase to 80U; dinner dose 50units/ for smaller dinner, decrease dose to 40 units.     Denies missed doses.  Has Medicare Extra Help    Eats 3 meals daily, + snacks  Drinks water, unsweet tea.   No formal " "exercise.     Standards of Care:  Eye exam: 10/16  Podiatry: 8/2017    ROS:   Gen: Appetite good, denies fatigue and weakness.  Skin: No rashes, no hair changes.  Eyes: Denies visual disturbances  Resp: no SOB or MCFADDEN, no cough today  Cardiac: No palpitations, chest pain, no edema   GI: No nausea or vomiting, diarrhea, constipation, or abdominal pain.  MS/Neuro: Denies numbness/ tingling in BLE; Gait steady, speech clear  Chronic back pain.   Psych: Denies drug/ETOH abuse, no hx of depression.  Other systems: negative.    PE: /68   Pulse 70   Ht 5' 5" (1.651 m)   Wt (!) 142 kg (313 lb 0.9 oz)   BMI 52.09 kg/m²     GENERAL: Well developed, well nourished.  PSYCH: AAOx3, appropriate mood and affect, pleasant expression, conversant, appears relaxed, well groomed.   EYES: Conjunctiva, corneas clear  NECK: Supple, trachea midline.  CHEST: Resp even and unlabored  CARDIAC: RRR, +2/6 murmur lower left sternal border  ABDOMEN: Soft, non-tender, non-distended; +BSs x4  VASCULAR: DP pulses +2/4 bilaterally, mild edema.  NEURO: Gait steady  SKIN: Skin warm and dry, + acanthosis nigracans.  FEET:  Appropriate footwear, Foot exam deferred, done 01//19/2018.   Injection sites are ok. No lipo hypertropthy or atrophy.     Hemoglobin A1C   Date Value Ref Range Status   01/12/2018 10.4 (H) 4.0 - 5.6 % Final     Comment:     According to ADA guidelines, hemoglobin A1c <7.0% represents  optimal control in non-pregnant diabetic patients. Different  metrics may apply to specific patient populations.   Standards of Medical Care in Diabetes-2016.  For the purpose of screening for the presence of diabetes:  <5.7%     Consistent with the absence of diabetes  5.7-6.4%  Consistent with increasing risk for diabetes   (prediabetes)  >or=6.5%  Consistent with diabetes  Currently, no consensus exists for use of hemoglobin A1c  for diagnosis of diabetes for children.  This Hemoglobin A1c assay has significant interference with fetal "   hemoglobin   (HbF). The results are invalid for patients with abnormal amounts of   HbF,   including those with known Hereditary Persistence   of Fetal Hemoglobin. Heterozygous hemoglobin variants (HbAS, HbAC,   HbAD, HbAE, HbA2) do not significantly interfere with this assay;   however, presence of multiple variants in a sample may impact the %   interference.     10/06/2017 9.4 (H) 4.0 - 5.6 % Final     Comment:     According to ADA guidelines, hemoglobin A1c <7.0% represents  optimal control in non-pregnant diabetic patients. Different  metrics may apply to specific patient populations.   Standards of Medical Care in Diabetes-2016.  For the purpose of screening for the presence of diabetes:  <5.7%     Consistent with the absence of diabetes  5.7-6.4%  Consistent with increasing risk for diabetes   (prediabetes)  >or=6.5%  Consistent with diabetes  Currently, no consensus exists for use of hemoglobin A1c  for diagnosis of diabetes for children.  This Hemoglobin A1c assay has significant interference with fetal   hemoglobin   (HbF). The results are invalid for patients with abnormal amounts of   HbF,   including those with known Hereditary Persistence   of Fetal Hemoglobin. Heterozygous hemoglobin variants (HbAS, HbAC,   HbAD, HbAE, HbA2) do not significantly interfere with this assay;   however, presence of multiple variants in a sample may impact the %   interference.     06/30/2017 9.8 (H) 4.0 - 5.6 % Final     Comment:     According to ADA guidelines, hemoglobin A1c <7.0% represents  optimal control in non-pregnant diabetic patients. Different  metrics may apply to specific patient populations.   Standards of Medical Care in Diabetes-2016.  For the purpose of screening for the presence of diabetes:  <5.7%     Consistent with the absence of diabetes  5.7-6.4%  Consistent with increasing risk for diabetes   (prediabetes)  >or=6.5%  Consistent with diabetes  Currently, no consensus exists for use of hemoglobin  A1c  for diagnosis of diabetes for children.  This Hemoglobin A1c assay has significant interference with fetal   hemoglobin   (HbF). The results are invalid for patients with abnormal amounts of   HbF,   including those with known Hereditary Persistence   of Fetal Hemoglobin. Heterozygous hemoglobin variants (HbAS, HbAC,   HbAD, HbAE, HbA2) do not significantly interfere with this assay;   however, presence of multiple variants in a sample may impact the %   interference.       ASSESSMENT and PLAN:  1. Type 2 DM with CKD stage 4 and hypertension  CBC auto differential    Hemoglobin A1c    Fructosamine    Hemoglobin A1c    Fructosamine   2. Type 2 diabetes mellitus with diabetic polyneuropathy, with long-term current use of insulin     3. 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 Diabetic autonomic neuropathy associated with type 2 diabetes mellitus     5. Morbid obesity due to excess calories     6. Anemia of chronic renal failure, stage 4 (severe)     7. Essential hypertension     8. Dyslipidemia        1T2DM: uncontrolled, with neuropathy, retinopathy, nephropathy.   - A1c & fructosamine today  - Humulin U500:  Continue current doses, only take 30 units with dinner if having a salad  - Continue Januvia 50mg QAM. Renal dose GFR 32.9.    - BG AC/HS. Send logs in 2 weeks. Call sooner for hypoglycemia.     Hypoglycemia management reviewed. Carry glucose.     Discussed A1c goals.     - takes ASA, ARB, statin  - Urine at next visit.   - Due for eye appointment, will make appt today     2. PDR with macular edema - optimize BG. Continue follow with Ophth.       3. CKD stage III - GFR 32.9, on ACEi therapy. No metformin or SLGT2i. Saw nephrology 11/2017.     4. Peripheral neuropathy - on Lyrica and Cymbalta with relief.      5. HLP - Controlled. Continue statin.      6. Morbid obesity   Increases insulin  resistance. Chronic pain limits physical activity.  Discussed medical wt loss. She is not interested in wt management at this time.     7. HTN - Controlled. On ARB.      Anemia  -- can alter a1c levels, check fructosamine as well.     Follow-up in about 3 months (around 6/13/2018).     Case discussed with Dr. Medina  Recommendations were discussed with the patient in detail  The patient verbalized understanding and agrees with the plan outlined as above.

## 2018-03-13 ENCOUNTER — OFFICE VISIT (OUTPATIENT)
Dept: INTERNAL MEDICINE | Facility: CLINIC | Age: 68
End: 2018-03-13
Payer: MEDICARE

## 2018-03-13 ENCOUNTER — LAB VISIT (OUTPATIENT)
Dept: LAB | Facility: HOSPITAL | Age: 68
End: 2018-03-13
Attending: NURSE PRACTITIONER
Payer: MEDICARE

## 2018-03-13 ENCOUNTER — OFFICE VISIT (OUTPATIENT)
Dept: ENDOCRINOLOGY | Facility: CLINIC | Age: 68
End: 2018-03-13
Payer: MEDICARE

## 2018-03-13 VITALS
BODY MASS INDEX: 48.82 KG/M2 | DIASTOLIC BLOOD PRESSURE: 68 MMHG | HEIGHT: 65 IN | SYSTOLIC BLOOD PRESSURE: 132 MMHG | HEART RATE: 70 BPM | WEIGHT: 293 LBS

## 2018-03-13 VITALS
WEIGHT: 293 LBS | DIASTOLIC BLOOD PRESSURE: 62 MMHG | BODY MASS INDEX: 48.82 KG/M2 | HEIGHT: 65 IN | SYSTOLIC BLOOD PRESSURE: 114 MMHG | HEART RATE: 74 BPM

## 2018-03-13 DIAGNOSIS — E11.42 TYPE 2 DIABETES MELLITUS WITH DIABETIC POLYNEUROPATHY, WITH LONG-TERM CURRENT USE OF INSULIN: ICD-10-CM

## 2018-03-13 DIAGNOSIS — E78.5 DYSLIPIDEMIA: Chronic | ICD-10-CM

## 2018-03-13 DIAGNOSIS — E11.22 TYPE 2 DM WITH CKD STAGE 4 AND HYPERTENSION: ICD-10-CM

## 2018-03-13 DIAGNOSIS — Z79.4 TYPE 2 DIABETES MELLITUS WITH DIABETIC POLYNEUROPATHY, WITH LONG-TERM CURRENT USE OF INSULIN: ICD-10-CM

## 2018-03-13 DIAGNOSIS — G63 POLYNEUROPATHY ASSOCIATED WITH UNDERLYING DISEASE: ICD-10-CM

## 2018-03-13 DIAGNOSIS — M47.816 OSTEOARTHRITIS OF LUMBAR SPINE, UNSPECIFIED SPINAL OSTEOARTHRITIS COMPLICATION STATUS: ICD-10-CM

## 2018-03-13 DIAGNOSIS — I12.9 TYPE 2 DM WITH CKD STAGE 4 AND HYPERTENSION: Primary | ICD-10-CM

## 2018-03-13 DIAGNOSIS — N18.4 TYPE 2 DM WITH CKD STAGE 4 AND HYPERTENSION: Primary | ICD-10-CM

## 2018-03-13 DIAGNOSIS — M19.90 OSTEOARTHRITIS, UNSPECIFIED OSTEOARTHRITIS TYPE, UNSPECIFIED SITE: ICD-10-CM

## 2018-03-13 DIAGNOSIS — E66.01 MORBID OBESITY DUE TO EXCESS CALORIES: Chronic | ICD-10-CM

## 2018-03-13 DIAGNOSIS — N18.30 TYPE 2 DIABETES MELLITUS WITH STAGE 3 CHRONIC KIDNEY DISEASE, WITH LONG-TERM CURRENT USE OF INSULIN: ICD-10-CM

## 2018-03-13 DIAGNOSIS — E11.22 TYPE 2 DM WITH CKD STAGE 4 AND HYPERTENSION: Primary | ICD-10-CM

## 2018-03-13 DIAGNOSIS — I10 HYPERTENSION, UNSPECIFIED TYPE: Primary | ICD-10-CM

## 2018-03-13 DIAGNOSIS — N18.4 ANEMIA OF CHRONIC RENAL FAILURE, STAGE 4 (SEVERE): ICD-10-CM

## 2018-03-13 DIAGNOSIS — I10 HYPERTENSION, UNSPECIFIED TYPE: ICD-10-CM

## 2018-03-13 DIAGNOSIS — Z86.73 HISTORY OF STROKE: ICD-10-CM

## 2018-03-13 DIAGNOSIS — E78.5 HYPERLIPIDEMIA, UNSPECIFIED HYPERLIPIDEMIA TYPE: ICD-10-CM

## 2018-03-13 DIAGNOSIS — N18.4 TYPE 2 DM WITH CKD STAGE 4 AND HYPERTENSION: ICD-10-CM

## 2018-03-13 DIAGNOSIS — D63.1 ANEMIA OF CHRONIC RENAL FAILURE, STAGE 4 (SEVERE): ICD-10-CM

## 2018-03-13 DIAGNOSIS — E11.22 TYPE 2 DIABETES MELLITUS WITH STAGE 3 CHRONIC KIDNEY DISEASE, WITH LONG-TERM CURRENT USE OF INSULIN: ICD-10-CM

## 2018-03-13 DIAGNOSIS — Z79.4 TYPE 2 DIABETES MELLITUS WITH STAGE 3 CHRONIC KIDNEY DISEASE, WITH LONG-TERM CURRENT USE OF INSULIN: ICD-10-CM

## 2018-03-13 DIAGNOSIS — I10 ESSENTIAL HYPERTENSION: ICD-10-CM

## 2018-03-13 DIAGNOSIS — I73.9 PAD (PERIPHERAL ARTERY DISEASE): Chronic | ICD-10-CM

## 2018-03-13 DIAGNOSIS — I12.9 TYPE 2 DM WITH CKD STAGE 4 AND HYPERTENSION: ICD-10-CM

## 2018-03-13 DIAGNOSIS — E11.43 DIABETIC AUTONOMIC NEUROPATHY ASSOCIATED WITH TYPE 2 DIABETES MELLITUS: ICD-10-CM

## 2018-03-13 LAB
BASOPHILS # BLD AUTO: 0.02 K/UL
BASOPHILS NFR BLD: 0.3 %
DIFFERENTIAL METHOD: ABNORMAL
EOSINOPHIL # BLD AUTO: 0.3 K/UL
EOSINOPHIL NFR BLD: 4.9 %
ERYTHROCYTE [DISTWIDTH] IN BLOOD BY AUTOMATED COUNT: 13.8 %
ESTIMATED AVG GLUCOSE: 212 MG/DL
HBA1C MFR BLD HPLC: 9 %
HCT VFR BLD AUTO: 30.1 %
HGB BLD-MCNC: 9.6 G/DL
LYMPHOCYTES # BLD AUTO: 1.4 K/UL
LYMPHOCYTES NFR BLD: 23.3 %
MCH RBC QN AUTO: 28.4 PG
MCHC RBC AUTO-ENTMCNC: 31.9 G/DL
MCV RBC AUTO: 89 FL
MONOCYTES # BLD AUTO: 0.6 K/UL
MONOCYTES NFR BLD: 10.6 %
NEUTROPHILS # BLD AUTO: 3.6 K/UL
NEUTROPHILS NFR BLD: 60.6 %
PLATELET # BLD AUTO: 250 K/UL
PMV BLD AUTO: 10.5 FL
RBC # BLD AUTO: 3.38 M/UL
TSH SERPL DL<=0.005 MIU/L-ACNC: 0.98 UIU/ML
WBC # BLD AUTO: 5.96 K/UL

## 2018-03-13 PROCEDURE — 99999 PR PBB SHADOW E&M-EST. PATIENT-LVL III: CPT | Mod: PBBFAC,,, | Performed by: NURSE PRACTITIONER

## 2018-03-13 PROCEDURE — 99213 OFFICE O/P EST LOW 20 MIN: CPT | Mod: PBBFAC | Performed by: NURSE PRACTITIONER

## 2018-03-13 PROCEDURE — 99214 OFFICE O/P EST MOD 30 MIN: CPT | Mod: S$PBB,,, | Performed by: NURSE PRACTITIONER

## 2018-03-13 PROCEDURE — 84443 ASSAY THYROID STIM HORMONE: CPT

## 2018-03-13 PROCEDURE — 83036 HEMOGLOBIN GLYCOSYLATED A1C: CPT | Mod: 59

## 2018-03-13 PROCEDURE — 99999 PR PBB SHADOW E&M-EST. PATIENT-LVL V: CPT | Mod: PBBFAC,,, | Performed by: INTERNAL MEDICINE

## 2018-03-13 PROCEDURE — 82985 ASSAY OF GLYCATED PROTEIN: CPT

## 2018-03-13 PROCEDURE — 99215 OFFICE O/P EST HI 40 MIN: CPT | Mod: PBBFAC,27 | Performed by: INTERNAL MEDICINE

## 2018-03-13 PROCEDURE — 99214 OFFICE O/P EST MOD 30 MIN: CPT | Mod: S$PBB,,, | Performed by: INTERNAL MEDICINE

## 2018-03-13 PROCEDURE — 85025 COMPLETE CBC W/AUTO DIFF WBC: CPT

## 2018-03-13 RX ORDER — ALLOPURINOL 100 MG/1
100 TABLET ORAL DAILY
Qty: 90 TABLET | Refills: 3 | Status: SHIPPED | OUTPATIENT
Start: 2018-03-13 | End: 2018-07-13 | Stop reason: SDUPTHER

## 2018-03-13 RX ORDER — CLOBETASOL PROPIONATE 0.5 MG/G
CREAM TOPICAL 2 TIMES DAILY PRN
Qty: 30 G | Refills: 1 | Status: SHIPPED | OUTPATIENT
Start: 2018-03-13 | End: 2018-08-24 | Stop reason: SDUPTHER

## 2018-03-13 NOTE — PROGRESS NOTES
"Subjective:       Patient ID: Annika Mac is a 67 y.o. female.    Chief Complaint: Establish Care   this is a 67-year-old who presents today for checkup wanted to establish having dificutly getting her shoes for diabetes  Was following with dr. Villatoro. She has been following with endocrinology regularly.  She has had difficulty with her diabetes and blood sugar control ups and downs for some time she's been on.  Medications most recently was placed back on U500 by endocrinology she had adjustment earlier today in her dosing depending on what she eats.  She had prior stroke in 2003 some residual weakness in her leg at the time she reports .she denies current chest pain or increased shortness of breath she does have diastolic dysfunction and follows with cardiology.  She has a rash on her fingers that have been bothering her some dryness and cracking at times.  She is up-to-date on her mammogram and had a recent colonoscopy     HPI  Review of Systems   Respiratory: Negative for chest tightness.    Gastrointestinal: Negative for abdominal pain.   Skin:        Rash fingers cracks at timese    Neurological:        Neuropathy   Callous feet has shaved   Needs foot forms comleted at some point     Prior stroke some weakness slow speech        Objective:     Blood pressure 114/62, pulse 74, height 5' 5" (1.651 m), weight (!) 142 kg (313 lb 0.9 oz).    Physical Exam   Constitutional: No distress.   HENT:   Head: Normocephalic.   Mouth/Throat: Oropharynx is clear and moist.   Eyes: No scleral icterus.   Neck: Neck supple.   Cardiovascular: Normal rate and regular rhythm.  Exam reveals no gallop and no friction rub.    Murmur heard.  Pulmonary/Chest: Effort normal and breath sounds normal. No respiratory distress.   Obese   Breast : normal no masses or tenderness    Abdominal: Soft. Bowel sounds are normal. She exhibits no mass. There is no tenderness.   Musculoskeletal: She exhibits no edema.   Surgical scar knee  Neuropathy " recently shaved callous left foot    Neurological: She is alert.   Slow speech    Skin: No erythema.   Psychiatric: She has a normal mood and affect.   Vitals reviewed.      Assessment:       1. Hypertension, unspecified type    2. Hyperlipidemia, unspecified hyperlipidemia type    3. History of stroke    4. Type 2 diabetes mellitus with diabetic polyneuropathy, with long-term current use of insulin    5. Dyslipidemia    6. Essential hypertension    7. Type 2 diabetes mellitus with stage 3 chronic kidney disease, with long-term current use of insulin    8. Polyneuropathy associated with underlying disease    9. Uncontrolled type 2 diabetes mellitus with both eyes affected by proliferative retinopathy without macular edema, with long-term current use of insulin    10. PAD (peripheral artery disease)    11. Morbid obesity due to excess calories    12. Osteoarthritis of lumbar spine, unspecified spinal osteoarthritis complication status    13. Osteoarthritis, unspecified osteoarthritis type, unspecified site        Plan:       Annika was seen today for annual exam and establish care.    Diagnoses and all orders for this visit:    Hypertension, unspecified type  -     TSH; Future    Hyperlipidemia, unspecified hyperlipidemia type  -     Lipid panel; Future    History of stroke  Remains on aspirn/statin     Type 2 diabetes mellitus with diabetic polyneuropathy, with long-term current use of insulin  Followed by endocrinology has tried multiple agens no on u500     Dyslipidemia  Remains on statin update lipids fasting     Essential hypertension  bp acceptable she is followed by neprhology     Anemia she had recent egd/colonsocpy following with hematology   And neprhology     Gout hx refill   -     allopurinol (ZYLOPRIM) 100 MG tablet; Take 1 tablet (100 mg total) by mouth once daily.    Hx neuropathy foot issues she will bring her form after podiatry completion  She is trying to get new shoes through [employer]     Hx  colon polyps had recent colonoscopy     Rash eczema trial of consider derm if persist avoid prolonged use   -     clobetasol (TEMOVATE) 0.05 % cream; Apply topically 2 (two) times daily as needed.    djd lumbar spine/spinal stenosis/osteoarthritis  Neuropathy she continues to follow with physical medicine     Follow up 4 months

## 2018-03-16 LAB — FRUCTOSAMINE SERPL-SCNC: 394 UMOL /L

## 2018-03-30 ENCOUNTER — OFFICE VISIT (OUTPATIENT)
Dept: URGENT CARE | Facility: CLINIC | Age: 68
End: 2018-03-30
Payer: MEDICARE

## 2018-03-30 ENCOUNTER — HOSPITAL ENCOUNTER (OUTPATIENT)
Dept: RADIOLOGY | Facility: HOSPITAL | Age: 68
Discharge: HOME | End: 2018-03-30
Attending: NURSE PRACTITIONER
Payer: MEDICARE

## 2018-03-30 VITALS
RESPIRATION RATE: 20 BRPM | DIASTOLIC BLOOD PRESSURE: 83 MMHG | WEIGHT: 293 LBS | HEIGHT: 65 IN | HEART RATE: 90 BPM | TEMPERATURE: 98 F | BODY MASS INDEX: 48.82 KG/M2 | SYSTOLIC BLOOD PRESSURE: 136 MMHG | OXYGEN SATURATION: 98 %

## 2018-03-30 DIAGNOSIS — M54.9 MID BACK PAIN ON LEFT SIDE: ICD-10-CM

## 2018-03-30 DIAGNOSIS — T14.8XXA ABRASION: ICD-10-CM
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
## 2018-03-30 PROCEDURE — 99214 OFFICE O/P EST MOD 30 MIN: CPT | Mod: S$PBB,,, | Performed by: FAMILY MEDICINE

## 2018-03-30 PROCEDURE — 71100 X-RAY EXAM RIBS UNI 2 VIEWS: CPT | Mod: 26,,, | Performed by: RADIOLOGY

## 2018-03-30 PROCEDURE — 99213 OFFICE O/P EST LOW 20 MIN: CPT | Mod: PBBFAC,25,PO

## 2018-03-30 PROCEDURE — 71100 X-RAY EXAM RIBS UNI 2 VIEWS: CPT | Mod: TC,FY,PO

## 2018-03-30 PROCEDURE — 99999 PR PBB SHADOW E&M-EST. PATIENT-LVL III: CPT | Mod: PBBFAC,,,

## 2018-03-30 RX ORDER — METHOCARBAMOL 500 MG/1
500 TABLET, FILM COATED ORAL 2 TIMES DAILY PRN
Qty: 20 TABLET | Refills: 0 | Status: SHIPPED | OUTPATIENT
Start: 2018-03-30 | End: 2018-04-09

## 2018-03-31 NOTE — PROGRESS NOTES
Subjective:       Patient ID: Annika Mac is a 67 y.o. female.    Chief Complaint: Fall    Fall   The accident occurred less than 1 hour ago (While walking down a step). The fall occurred while walking. The volume of blood lost was minimal. Point of impact: back  The pain is present in the back. The pain is at a severity of 8/10. The pain is moderate. The symptoms are aggravated by pressure on injury. Pertinent negatives include no fever, loss of consciousness, numbness or tingling. She has tried nothing for the symptoms.     Review of Systems   Constitutional: Negative for chills and fever.   Respiratory: Negative for cough and shortness of breath.    Musculoskeletal: Positive for back pain. Negative for gait problem and joint swelling.   Skin: Positive for wound (abrasion ).   Neurological: Negative for tingling, loss of consciousness and numbness.   Psychiatric/Behavioral: Negative for agitation and confusion.       Objective:      Physical Exam   Constitutional: She is oriented to person, place, and time. Vital signs are normal. She appears well-developed and well-nourished.   HENT:   Head: Normocephalic and atraumatic.   Neck: Normal range of motion.   Cardiovascular: Normal rate and regular rhythm.    Pulmonary/Chest: Effort normal and breath sounds normal.   Musculoskeletal: Normal range of motion.        Thoracic back: She exhibits tenderness. She exhibits normal range of motion.        Back:    Neurological: She is alert and oriented to person, place, and time.   Skin: Skin is warm. Abrasion (at site of impact) noted.   Psychiatric: She has a normal mood and affect. Her behavior is normal.       Assessment:       1. Fall, initial encounter    2. Mid back pain on left side    3. Abrasion        Plan:         Fall, initial encounter    Mid back pain on left side  Comments:  Continue pain medication. Can take Robaxin (muscle relaxer) as needed.  Monitor symptoms and follow up if no improvement.   Orders:  -      X-Ray Ribs 2 View Right; Future; Expected date: 03/30/2018  -     methocarbamol (ROBAXIN) 500 MG Tab; Take 1 tablet (500 mg total) by mouth 2 (two) times daily as needed.  Dispense: 20 tablet; Refill: 0    Abrasion        Encouraged to alternate ice and heat to site.  Will give muscle relaxer if needed.  Instructed to continue pain medicine as prescribed.  Go to ER is symptoms worsen.      After viewing images, informed that no visible fractures noted.  Will notify of final x-ray report.

## 2018-04-11 ENCOUNTER — OFFICE VISIT (OUTPATIENT)
Dept: ORTHOPEDICS | Facility: CLINIC | Age: 68
End: 2018-04-11
Payer: MEDICARE

## 2018-04-11 ENCOUNTER — HOSPITAL ENCOUNTER (OUTPATIENT)
Dept: RADIOLOGY | Facility: HOSPITAL | Age: 68
Discharge: HOME | End: 2018-04-11
Attending: PHYSICIAN ASSISTANT
Payer: MEDICARE

## 2018-04-11 VITALS
DIASTOLIC BLOOD PRESSURE: 67 MMHG | WEIGHT: 293 LBS | SYSTOLIC BLOOD PRESSURE: 104 MMHG | HEART RATE: 68 BPM | HEIGHT: 65 IN | BODY MASS INDEX: 48.82 KG/M2

## 2018-04-11 DIAGNOSIS — R52 PAIN: Primary | ICD-10-CM

## 2018-04-11 DIAGNOSIS — R52 PAIN AGGRAVATED BY EATING OR DRINKING: ICD-10-CM

## 2018-04-11 DIAGNOSIS — M25.561 ACUTE PAIN OF RIGHT KNEE: ICD-10-CM

## 2018-04-11 DIAGNOSIS — R52 PAIN: ICD-10-CM

## 2018-04-11 DIAGNOSIS — S93.491A SPRAIN OF ANTERIOR TALOFIBULAR LIGAMENT OF RIGHT ANKLE, INITIAL ENCOUNTER: ICD-10-CM

## 2018-04-11 PROCEDURE — 99215 OFFICE O/P EST HI 40 MIN: CPT | Mod: PBBFAC,25 | Performed by: PHYSICIAN ASSISTANT

## 2018-04-11 PROCEDURE — 73610 X-RAY EXAM OF ANKLE: CPT | Mod: TC,RT

## 2018-04-11 PROCEDURE — 73562 X-RAY EXAM OF KNEE 3: CPT | Mod: 26,RT,, | Performed by: RADIOLOGY

## 2018-04-11 PROCEDURE — 73560 X-RAY EXAM OF KNEE 1 OR 2: CPT | Mod: TC,LT

## 2018-04-11 PROCEDURE — 99213 OFFICE O/P EST LOW 20 MIN: CPT | Mod: S$PBB,,, | Performed by: PHYSICIAN ASSISTANT

## 2018-04-11 PROCEDURE — 99999 PR PBB SHADOW E&M-EST. PATIENT-LVL V: CPT | Mod: PBBFAC,,, | Performed by: PHYSICIAN ASSISTANT

## 2018-04-11 PROCEDURE — 73610 X-RAY EXAM OF ANKLE: CPT | Mod: 26,RT,, | Performed by: RADIOLOGY

## 2018-04-11 PROCEDURE — 73562 X-RAY EXAM OF KNEE 3: CPT | Mod: TC,RT

## 2018-04-11 PROCEDURE — 73560 X-RAY EXAM OF KNEE 1 OR 2: CPT | Mod: 26,LT,, | Performed by: RADIOLOGY

## 2018-04-11 NOTE — PROGRESS NOTES
SUBJECTIVE:     Chief Complaint & History of Present Illness:  Annika Mac is a Established patient 67 y.o. female who is seen here today with a complaint of    Chief Complaint   Patient presents with    Right Knee - Pain    Right Ankle - Pain, Swelling    .  She is patient well known to me was last seen and treated in the clinic 12/04/2017 at which time she undergone a cortisone injection of the right knee.  She is status post left TKA.  She suffered a fall 3/30/2018 was seen and treated in urgent care she's here today for follow on care for her right knee pain and ankle pain and swelling  On a scale of 1-10, with 10 being worst pain imaginable, he rates this pain as 5 on good days and 8 on bad days.  she describes the pain as tender and sore.    Review of patient's allergies indicates:   Allergen Reactions    Iodinated contrast- oral and iv dye Rash         Current Outpatient Prescriptions   Medication Sig Dispense Refill    albuterol 90 mcg/actuation inhaler Inhale 2 puffs into the lungs every 6 (six) hours as needed for Wheezing. 1 Inhaler 0    allopurinol (ZYLOPRIM) 100 MG tablet Take 1 tablet (100 mg total) by mouth once daily. 90 tablet 3    amLODIPine (NORVASC) 5 MG tablet Take 1 tablet (5 mg total) by mouth once daily. 90 tablet 1    aspirin 81 MG Chew Take 1 tablet (81 mg total) by mouth once daily.  0    azelastine (ASTELIN) 137 mcg (0.1 %) nasal spray 1 spray (137 mcg total) by Nasal route 2 (two) times daily. 30 mL 11    blood-glucose meter kit Use as instructed, true test/result meter 1 each 0    DULoxetine (CYMBALTA) 30 MG capsule Take 1 capsule (30 mg total) by mouth once daily. 90 capsule 1    famotidine (PEPCID) 20 MG tablet Take 1 tablet (20 mg total) by mouth 2 (two) times daily. 1 tablet 0    ferrous sulfate 325 mg (65 mg iron) Tab tablet Take 1 tablet (325 mg total) by mouth 2 (two) times daily.  0    fexofenadine (ALLEGRA) 180 MG tablet TAKE 1 TABLET BY MOUTH ONCE DAILY 30  "tablet 0    fluticasone (FLONASE) 50 mcg/actuation nasal spray 2 sprays by Each Nare route once daily. 48 g 6    furosemide (LASIX) 20 MG tablet Take 1 tablet (20 mg total) by mouth once daily. 90 tablet 2    hydrocodone-acetaminophen 10-325mg (NORCO)  mg Tab Take 1 tablet by mouth every 8 (eight) hours as needed for Pain. 90 tablet 0    [START ON 4/21/2018] hydrocodone-acetaminophen 10-325mg (NORCO)  mg Tab Take 1 tablet by mouth every 8 (eight) hours as needed for Pain. 90 tablet 0    insulin regular hum U-500 conc (HUMULIN R U-500, CONC, KWIKPEN) 500 unit/mL (3 mL) InPn Inject  Units into the skin 3 (three) times daily before meals. Take 115u with breakfast,85u with lunch, and 50u with dinner. 11 Syringe 4    irbesartan (AVAPRO) 300 MG tablet Take 0.5 tablets (150 mg total) by mouth every evening. 90 tablet 2    labetalol (NORMODYNE) 300 MG tablet Take 1 tablet (300 mg total) by mouth 2 (two) times daily. 180 tablet 2    lancets 31 gauge Misc 1 lancet by Misc.(Non-Drug; Combo Route) route 4 (four) times daily. 150 each 6    montelukast (SINGULAIR) 10 mg tablet TAKE 1 TABLET(10 MG) BY MOUTH EVERY EVENING 90 tablet 0    pen needle, diabetic 32 gauge x 5/32" Ndle Use with multiple daily insulin injections 100 each 12    pregabalin (LYRICA) 100 MG capsule TAKE 1 CAPSULE BY MOUTH TWO TIMES DAILY 180 capsule 1    simvastatin (ZOCOR) 40 MG tablet Take 0.5 tablets (20 mg total) by mouth every evening. 90 tablet 4    SITagliptin (JANUVIA) 50 MG Tab Take 1 tablet (50 mg total) by mouth once daily. 90 tablet 4    spironolactone (ALDACTONE) 25 MG tablet Take 0.5 tablets (12.5 mg total) by mouth once daily. 90 tablet 0    TRUETEST TEST STRIPS Strp 1 strip by Misc.(Non-Drug; Combo Route) route 4 (four) times daily. 400 strip 4    clobetasol (TEMOVATE) 0.05 % cream Apply topically 2 (two) times daily as needed. 30 g 1    nystatin (MYCOSTATIN) powder Apply topically 3 (three) times daily. 60 g " 2     No current facility-administered medications for this visit.        Past Medical History:   Diagnosis Date    Allergy     Anemia 2012    Arthritis     CHF (congestive heart failure)     CKD (chronic kidney disease) stage 3, GFR 30-59 ml/min 2012    Clotting disorder     Deep vein thrombophlebitis of left leg 2012    Degenerative disc disease     Diabetic peripheral neuropathy associated with type 2 diabetes mellitus 2012    GERD (gastroesophageal reflux disease)     HTN (hypertension) 2012    Hyperlipidemia 2012    Lead-induced gout of ankle or foot     right going on three weeks    Nonproliferative diabetic retinopathy of right eye 2012    Obstructive sleep apnea 2012    Osteoporosis     Proliferative diabetic retinopathy of left eye 2012    Stroke 2003    Type 2 diabetes mellitus with diabetic polyneuropathy 2012    Ulcer        Past Surgical History:   Procedure Laterality Date    CATARACT EXTRACTION W/  INTRAOCULAR LENS IMPLANT Left 3/2002    left     CATARACT EXTRACTION W/  INTRAOCULAR LENS IMPLANT Right     OD dr. olivares     SECTION      COLONOSCOPY N/A 2018    Procedure: COLONOSCOPY;  Surgeon: Faizan Mac MD;  Location: [facility] ([address] [place] [address]);  Service: Endoscopy;  Laterality: N/A;    CYST REMOVAL  2011    left side of face    EYE SURGERY Bilateral 2012    eye implants    GANGLION CYST EXCISION  2007    Right wrist    Pan Retinal Photocoagulation Bilateral     Dr. Monterroso (Proliferative Diabetic Retinopathy)    TOTAL ABDOMINAL HYSTERECTOMY  1982    TOTAL KNEE ARTHROPLASTY  2006    left    TRIGGER FINGER RELEASE  2009       Vital Signs (Most Recent)  Vitals:    18 1101   BP: 104/67   Pulse: 68           Review of Systems:  ROS:  Constitutional: no fever or chills  Eyes: no visual changes  ENT: no nasal congestion or sore throat  Respiratory: no cough or shortness of  "breath  Cardiovascular: no chest pain or palpitations, CHF, CAD, stroke  Gastrointestinal: no nausea or vomiting, tolerating diet, GERD  Genitourinary: no hematuria or dysuria, chronic kidney disease stage III  Integument/Breast: no rash or pruritis  Hematologic/Lymphatic: no easy bruising or lymphadenopathy  Musculoskeletal: positive for arthralgias, back pain, myalgias, neck pain, stiff joints and gouty arthritis, negative for muscle weakness  Neurological: no seizures or tremors  Behavioral/Psych: no auditory or visual hallucinations  Endocrine: no heat or cold intolerance, diabetes type 2 with neuropathy              OBJECTIVE:     PHYSICAL EXAM:  Height: 5' 5" (165.1 cm) Weight: (!) 141.4 kg (311 lb 11.7 oz), General Appearance: Well nourished, well developed, in no acute distress.  Neurological: Mood & affect are normal.  right  Knee Exam:  Knee Range of Motion:0-95 degrees flexion   Effusion:none  Condition of skin:intact  Location of tenderness: Globally   Strength:limited by pain  Stability:  Lachman: stable, LCL: stable, MCL: stable, PCL: stable and posteromedial (dial): stable  Varus /Valgus stress:   Mild varus  Hans:   negative/negative    right  Foot/Ankle    Skin: normal  Swelling: mild and diffuse  Warmth: no warmth  Tenderness: moderate  ROM: 90 degrees dorsiflexion, 170 degrees plantarflexion, 25 degrees inversion and 30 degrees eversion  Strength: limited by pain  Gait: antalgic  Stability: anterior drawer: positive, exterior rotation test: negative, Lachman: positive and Cotton test: negative    Moderate swelling in and about the foot with tenderness to palpation at the talo fibular ligament and the tibionavicular ligament          RADIOGRAPHS:  X-rays the knee taken today films reviewed by me demonstrate severe arthritis throughout the right knee with complete loss of medial joint space marked osteophytic spurring and sclerotic changes noted.  X-rays of the ankle demonstrate no evidence of " fracture dislocation mortise is well-maintained    ASSESSMENT/PLAN:     Plan:  Paste patient in a fracture boot for support and protection of the ankle weightbearing as tolerated ice 2-3 times a day  It is too soon from her previous injection for injection to the knee  Keep ambulations to a minimum  Follow-up in 2 weeks to assess progress

## 2018-04-20 NOTE — PROGRESS NOTES
Subjective:       Patient ID: Annika Mac is a 67 y.o. female.    Chief Complaint: No chief complaint on file.    Anemia        Mrs. Mac presents today for followup for her longstanding anemia. Briefly, she is a morbidly obese 64-year-old -American female with longstanding anemia, whom I have followed expectantly for at least the last six years. She is here today for her scheduled followup appointment.   Her CBC from earlier today shows a WBC count is 7,000 /mm3, Hg 9.6 gr/dl, Ht 30.4 %, MCV 88 and platelet count of 2132,000 /mm3.   Her creatinine is 1.7 mg/dl.      Review of Systems  Overall she feels OK. She again complains of her long standing arthritis, and is having difficulty ambulating.  She is wearing a walking boot and states that she recently fractured her right ankle.  She denies any anxiety, depression, easy bruising, fevers, chills, night sweats, weight loss, nausea, vomiting, diarrhea, diplopia, blurred vision, epistaxi,s hematuria, or headaches.      Objective:      Physical Exam  GENERAL: She is alert, oriented to time, place, person, pleasant, well nourished, in no acute physical distress.   VITAL SIGNS: Reviewed.   HEENT: Normal. There are no nasal, oral, lip, gingival, auricular, lid, conjunctival lesions. Pupils are equal, reactive to light and   accommodation and extraocular muscle movements are intact. Mucosae are moist and pink, and there is no thrush.   NECK: Supple without JVD, adenopathy, or thyromegaly.   LUNGS: Clear to auscultation without wheezing, rales, or rhonchi.   CARDIOVASCULAR: Reveals an S1, S2, no murmurs, no rubs, no gallops.   ABDOMEN: Obese, soft, nontender, without organomegaly. Bowel sounds are present. A median abdominal scar is seen extending from the umbilicus to the symphysis pubis.   EXTREMITIES: No cyanosis or clubbing. There is 1(+) edema at the ankle level bilaterally. She does have a walking boot on the right.  SKIN: Does not have petechiae, rashes,  induration, or ecchymoses.   NEUROLOGIC: Motor function is 5/5, DTRs are 0 to 1+ bilaterally, symmetrical, and cranial nerves within normal limits.   LYMPHATIC: There is no cervical, axillary, or supraclavicular adenopathy.   Assessment:       1. Anemia, chronic, stable      2.    Morbid obesity  3.      Diabetes  Plan:         I have asked her to return to see me in 6 months, and she will submit three stool samples at that time.  Given the chronicity of her anemia, I am not inclined to perform a bone marrow biopsy unless her anemia gets worse.  Her questions were answered to her satisfaction.

## 2018-04-23 ENCOUNTER — OFFICE VISIT (OUTPATIENT)
Dept: ORTHOPEDICS | Facility: CLINIC | Age: 68
End: 2018-04-23
Payer: MEDICARE

## 2018-04-23 ENCOUNTER — OFFICE VISIT (OUTPATIENT)
Dept: HEMATOLOGY/ONCOLOGY | Facility: CLINIC | Age: 68
End: 2018-04-23
Payer: MEDICARE

## 2018-04-23 ENCOUNTER — LAB VISIT (OUTPATIENT)
Dept: LAB | Facility: HOSPITAL | Age: 68
End: 2018-04-23
Attending: INTERNAL MEDICINE
Payer: MEDICARE

## 2018-04-23 ENCOUNTER — PATIENT OUTREACH (OUTPATIENT)
Dept: DIABETES | Facility: CLINIC | Age: 68
End: 2018-04-23

## 2018-04-23 VITALS
SYSTOLIC BLOOD PRESSURE: 129 MMHG | TEMPERATURE: 98 F | DIASTOLIC BLOOD PRESSURE: 60 MMHG | WEIGHT: 293 LBS | HEIGHT: 65 IN | RESPIRATION RATE: 18 BRPM | BODY MASS INDEX: 48.82 KG/M2 | HEART RATE: 65 BPM | OXYGEN SATURATION: 99 %

## 2018-04-23 DIAGNOSIS — E66.01 MORBID OBESITY DUE TO EXCESS CALORIES: Chronic | ICD-10-CM

## 2018-04-23 DIAGNOSIS — D64.9 ANEMIA, UNSPECIFIED TYPE: Chronic | ICD-10-CM

## 2018-04-23 DIAGNOSIS — M17.11 PRIMARY OSTEOARTHRITIS OF RIGHT KNEE: ICD-10-CM

## 2018-04-23 DIAGNOSIS — D64.9 ANEMIA, UNSPECIFIED TYPE: Primary | Chronic | ICD-10-CM

## 2018-04-23 DIAGNOSIS — E78.5 HYPERLIPIDEMIA, UNSPECIFIED HYPERLIPIDEMIA TYPE: ICD-10-CM

## 2018-04-23 DIAGNOSIS — S93.491D SPRAIN OF ANTERIOR TALOFIBULAR LIGAMENT OF RIGHT ANKLE, SUBSEQUENT ENCOUNTER: Primary | ICD-10-CM

## 2018-04-23 LAB
BASOPHILS # BLD AUTO: 0.03 K/UL
BASOPHILS NFR BLD: 0.4 %
CHOLEST SERPL-MCNC: 144 MG/DL
CHOLEST/HDLC SERPL: 4.2 {RATIO}
DIFFERENTIAL METHOD: ABNORMAL
EOSINOPHIL # BLD AUTO: 0.4 K/UL
EOSINOPHIL NFR BLD: 5.4 %
ERYTHROCYTE [DISTWIDTH] IN BLOOD BY AUTOMATED COUNT: 14 %
HCT VFR BLD AUTO: 30.4 %
HDLC SERPL-MCNC: 34 MG/DL
HDLC SERPL: 23.6 %
HGB BLD-MCNC: 9.6 G/DL
IMM GRANULOCYTES # BLD AUTO: 0.03 K/UL
IMM GRANULOCYTES NFR BLD AUTO: 0.4 %
LDLC SERPL CALC-MCNC: 72 MG/DL
LYMPHOCYTES # BLD AUTO: 1.7 K/UL
LYMPHOCYTES NFR BLD: 23.4 %
MCH RBC QN AUTO: 28.5 PG
MCHC RBC AUTO-ENTMCNC: 31.6 G/DL
MCV RBC AUTO: 90 FL
MONOCYTES # BLD AUTO: 0.8 K/UL
MONOCYTES NFR BLD: 11.7 %
NEUTROPHILS # BLD AUTO: 4.2 K/UL
NEUTROPHILS NFR BLD: 58.7 %
NONHDLC SERPL-MCNC: 110 MG/DL
NRBC BLD-RTO: 0 /100 WBC
PLATELET # BLD AUTO: 232 K/UL
PMV BLD AUTO: 10.2 FL
RBC # BLD AUTO: 3.37 M/UL
TRIGL SERPL-MCNC: 190 MG/DL
WBC # BLD AUTO: 7.08 K/UL

## 2018-04-23 PROCEDURE — 99213 OFFICE O/P EST LOW 20 MIN: CPT | Mod: S$PBB,,, | Performed by: PHYSICIAN ASSISTANT

## 2018-04-23 PROCEDURE — 99211 OFF/OP EST MAY X REQ PHY/QHP: CPT | Mod: PBBFAC,27 | Performed by: INTERNAL MEDICINE

## 2018-04-23 PROCEDURE — 99213 OFFICE O/P EST LOW 20 MIN: CPT | Mod: S$PBB,,, | Performed by: INTERNAL MEDICINE

## 2018-04-23 PROCEDURE — 85025 COMPLETE CBC W/AUTO DIFF WBC: CPT

## 2018-04-23 PROCEDURE — 80061 LIPID PANEL: CPT

## 2018-04-23 PROCEDURE — 99999 PR PBB SHADOW E&M-EST. PATIENT-LVL III: CPT | Mod: PBBFAC,,, | Performed by: PHYSICIAN ASSISTANT

## 2018-04-23 PROCEDURE — 36415 COLL VENOUS BLD VENIPUNCTURE: CPT

## 2018-04-23 PROCEDURE — 99213 OFFICE O/P EST LOW 20 MIN: CPT | Mod: PBBFAC | Performed by: PHYSICIAN ASSISTANT

## 2018-04-23 PROCEDURE — 99999 PR PBB SHADOW E&M-EST. PATIENT-LVL I: CPT | Mod: PBBFAC,,, | Performed by: INTERNAL MEDICINE

## 2018-04-23 NOTE — PROGRESS NOTES
SUBJECTIVE:     Chief Complaint & History of Present Illness:  Annika Mac is a  Established  patient 67 y.o. female who is seen here today with a complaint of  right knee and ankle pain .  She is patient well known to me was last seen and treated in the clinic for this condition for/11/2018 at which time we had placed her into a fracture boot for her right ankle pain.  She reports about 30% improvement in the discomfort in the ankle.  She has removed it 2-3 times a day for active range of motion exercises and wears a diligent with movement standing and weightbearing.  Her knee pain persists but is unchanged from her previous visit  On a scale of 1-10, with 10 being worst pain imaginable, he rates this pain as 3 on good days and 5 on bad days.  she describes the pain as sore and achy.    Review of patient's allergies indicates:   Allergen Reactions    Iodinated contrast- oral and iv dye Rash         Current Outpatient Prescriptions   Medication Sig Dispense Refill    albuterol 90 mcg/actuation inhaler Inhale 2 puffs into the lungs every 6 (six) hours as needed for Wheezing. 1 Inhaler 0    allopurinol (ZYLOPRIM) 100 MG tablet Take 1 tablet (100 mg total) by mouth once daily. 90 tablet 3    amLODIPine (NORVASC) 5 MG tablet Take 1 tablet (5 mg total) by mouth once daily. 90 tablet 1    aspirin 81 MG Chew Take 1 tablet (81 mg total) by mouth once daily.  0    azelastine (ASTELIN) 137 mcg (0.1 %) nasal spray 1 spray (137 mcg total) by Nasal route 2 (two) times daily. 30 mL 11    blood-glucose meter kit Use as instructed, true test/result meter 1 each 0    clobetasol (TEMOVATE) 0.05 % cream Apply topically 2 (two) times daily as needed. 30 g 1    DULoxetine (CYMBALTA) 30 MG capsule Take 1 capsule (30 mg total) by mouth once daily. 90 capsule 1    famotidine (PEPCID) 20 MG tablet Take 1 tablet (20 mg total) by mouth 2 (two) times daily. 1 tablet 0    ferrous sulfate 325 mg (65 mg iron) Tab tablet Take 1  "tablet (325 mg total) by mouth 2 (two) times daily.  0    fexofenadine (ALLEGRA) 180 MG tablet TAKE 1 TABLET BY MOUTH ONCE DAILY 30 tablet 0    fluticasone (FLONASE) 50 mcg/actuation nasal spray 2 sprays by Each Nare route once daily. 48 g 6    furosemide (LASIX) 20 MG tablet Take 1 tablet (20 mg total) by mouth once daily. 90 tablet 2    hydrocodone-acetaminophen 10-325mg (NORCO)  mg Tab Take 1 tablet by mouth every 8 (eight) hours as needed for Pain. 90 tablet 0    insulin regular hum U-500 conc (HUMULIN R U-500, CONC, KWIKPEN) 500 unit/mL (3 mL) InPn Inject  Units into the skin 3 (three) times daily before meals. Take 115u with breakfast,85u with lunch, and 50u with dinner. 11 Syringe 4    irbesartan (AVAPRO) 300 MG tablet Take 0.5 tablets (150 mg total) by mouth every evening. 90 tablet 2    labetalol (NORMODYNE) 300 MG tablet Take 1 tablet (300 mg total) by mouth 2 (two) times daily. 180 tablet 2    lancets 31 gauge Misc 1 lancet by Misc.(Non-Drug; Combo Route) route 4 (four) times daily. 150 each 6    montelukast (SINGULAIR) 10 mg tablet TAKE 1 TABLET(10 MG) BY MOUTH EVERY EVENING 90 tablet 0    nystatin (MYCOSTATIN) powder Apply topically 3 (three) times daily. 60 g 2    pen needle, diabetic 32 gauge x 5/32" Ndle Use with multiple daily insulin injections 100 each 12    pregabalin (LYRICA) 100 MG capsule TAKE 1 CAPSULE BY MOUTH TWO TIMES DAILY 180 capsule 1    simvastatin (ZOCOR) 40 MG tablet Take 0.5 tablets (20 mg total) by mouth every evening. 90 tablet 4    SITagliptin (JANUVIA) 50 MG Tab Take 1 tablet (50 mg total) by mouth once daily. 90 tablet 4    spironolactone (ALDACTONE) 25 MG tablet Take 0.5 tablets (12.5 mg total) by mouth once daily. 90 tablet 0    TRUETEST TEST STRIPS Strp 1 strip by Misc.(Non-Drug; Combo Route) route 4 (four) times daily. 400 strip 4     No current facility-administered medications for this visit.        Past Medical History:   Diagnosis Date    " Allergy     Anemia 2012    Arthritis     CHF (congestive heart failure)     CKD (chronic kidney disease) stage 3, GFR 30-59 ml/min 2012    Clotting disorder     Deep vein thrombophlebitis of left leg 2012    Degenerative disc disease     Diabetic peripheral neuropathy associated with type 2 diabetes mellitus 2012    GERD (gastroesophageal reflux disease)     HTN (hypertension) 2012    Hyperlipidemia 2012    Lead-induced gout of ankle or foot     right going on three weeks    Nonproliferative diabetic retinopathy of right eye 2012    Obstructive sleep apnea 2012    Osteoporosis     Proliferative diabetic retinopathy of left eye 2012    Stroke 2003    Type 2 diabetes mellitus with diabetic polyneuropathy 2012    Ulcer        Past Surgical History:   Procedure Laterality Date    CATARACT EXTRACTION W/  INTRAOCULAR LENS IMPLANT Left 3/2002    left     CATARACT EXTRACTION W/  INTRAOCULAR LENS IMPLANT Right     OD dr. olivares     SECTION      COLONOSCOPY N/A 2018    Procedure: COLONOSCOPY;  Surgeon: Faizan Mac MD;  Location: [facility] ([address] [place] [address]);  Service: Endoscopy;  Laterality: N/A;    CYST REMOVAL  2011    left side of face    EYE SURGERY Bilateral 2012    eye implants    GANGLION CYST EXCISION  2007    Right wrist    Pan Retinal Photocoagulation Bilateral     Dr. Monterroso (Proliferative Diabetic Retinopathy)    TOTAL ABDOMINAL HYSTERECTOMY  1982    TOTAL KNEE ARTHROPLASTY  2006    left    TRIGGER FINGER RELEASE  2009       Vital Signs (Most Recent)  There were no vitals filed for this visit.    Review of Systems:  ROS:  Constitutional: no fever or chills  Eyes: no visual changes  ENT: no nasal congestion or sore throat  Respiratory: no cough or shortness of breath  Cardiovascular: no chest pain or palpitations, CHF, CAD, stroke  Gastrointestinal: no nausea or vomiting, tolerating diet,  GERD  Genitourinary: no hematuria or dysuria, chronic kidney disease stage III  Integument/Breast: no rash or pruritis  Hematologic/Lymphatic: no easy bruising or lymphadenopathy  Musculoskeletal: positive for arthralgias, back pain, myalgias, neck pain, stiff joints and gouty arthritis, negative for muscle weakness  Neurological: no seizures or tremors  Behavioral/Psych: no auditory or visual hallucinations  Endocrine: no heat or cold intolerance, diabetes type 2 with neuropathy      OBJECTIVE:     PHYSICAL EXAM:     , General Appearance: Well nourished, well developed, in no acute distress.  Neurological: Mood & affect are normal.  right  Knee Exam:  Knee Range of Motion:0-100 degrees flexion   Effusion:none  Condition of skin:intact  Location of tenderness: Globally   Strength:limited by pain  Stability:  Lachman: stable, LCL: stable, MCL: stable, PCL: stable and posteromedial (dial): stable  Varus /Valgus stress:   Mild varus  Hans:   negative/negative     right  Foot/Ankle     Skin: normal  Swelling: mild and diffuse  Warmth: no warmth  Tenderness: moderate  ROM: 90 degrees dorsiflexion, 170 degrees plantarflexion, 25 degrees inversion and 30 degrees eversion  Strength: limited by pain  Gait: antalgic  Stability: anterior drawer: positive, exterior rotation test: negative, Lachman: positive and Cotton test: negative     Mild swelling in and about the foot with tenderness to palpation at the talo fibular ligament and the tibionavicular ligament          ASSESSMENT/PLAN:     Plan:  She'll continue to the fracture boot for additional 2 weeks weightbearing as tolerated remove fractured range of motion exercises to 3 times a day.  We will see her back in 2 weeks for reassessment we'll consider injection therapy in the right knee at that time

## 2018-04-24 ENCOUNTER — PATIENT OUTREACH (OUTPATIENT)
Dept: DIABETES | Facility: CLINIC | Age: 68
End: 2018-04-24

## 2018-04-24 NOTE — PROGRESS NOTES
BG logs reviewed by NIDHI Wilkerson NP. She advised patient to do 120 u with breakfast, 90u with lunch, & 55u with dinner. She needs more insulin. Increase by 5 across the board and send us logs in 2 weeks. Patient notified of changes and verbalized understanding.

## 2018-05-07 ENCOUNTER — OFFICE VISIT (OUTPATIENT)
Dept: ORTHOPEDICS | Facility: CLINIC | Age: 68
End: 2018-05-07
Payer: MEDICARE

## 2018-05-07 ENCOUNTER — TELEPHONE (OUTPATIENT)
Dept: ORTHOPEDICS | Facility: CLINIC | Age: 68
End: 2018-05-07

## 2018-05-07 VITALS
HEIGHT: 65 IN | SYSTOLIC BLOOD PRESSURE: 110 MMHG | DIASTOLIC BLOOD PRESSURE: 62 MMHG | BODY MASS INDEX: 48.82 KG/M2 | HEART RATE: 74 BPM | WEIGHT: 293 LBS

## 2018-05-07 DIAGNOSIS — S93.491D SPRAIN OF ANTERIOR TALOFIBULAR LIGAMENT OF RIGHT ANKLE, SUBSEQUENT ENCOUNTER: Primary | ICD-10-CM

## 2018-05-07 DIAGNOSIS — M17.11 PRIMARY OSTEOARTHRITIS OF RIGHT KNEE: ICD-10-CM

## 2018-05-07 PROCEDURE — 99999 PR PBB SHADOW E&M-EST. PATIENT-LVL IV: CPT | Mod: PBBFAC,,, | Performed by: PHYSICIAN ASSISTANT

## 2018-05-07 PROCEDURE — 20610 DRAIN/INJ JOINT/BURSA W/O US: CPT | Mod: PBBFAC | Performed by: PHYSICIAN ASSISTANT

## 2018-05-07 PROCEDURE — 20610 DRAIN/INJ JOINT/BURSA W/O US: CPT | Mod: S$PBB,RT,, | Performed by: PHYSICIAN ASSISTANT

## 2018-05-07 PROCEDURE — 99213 OFFICE O/P EST LOW 20 MIN: CPT | Mod: S$PBB,25,, | Performed by: PHYSICIAN ASSISTANT

## 2018-05-07 PROCEDURE — 99214 OFFICE O/P EST MOD 30 MIN: CPT | Mod: PBBFAC | Performed by: PHYSICIAN ASSISTANT

## 2018-05-07 RX ORDER — BETAMETHASONE SODIUM PHOSPHATE AND BETAMETHASONE ACETATE 3; 3 MG/ML; MG/ML
6 INJECTION, SUSPENSION INTRA-ARTICULAR; INTRALESIONAL; INTRAMUSCULAR; SOFT TISSUE
Status: COMPLETED | OUTPATIENT
Start: 2018-05-07 | End: 2018-05-07

## 2018-05-07 RX ADMIN — BETAMETHASONE SODIUM PHOSPHATE AND BETAMETHASONE ACETATE 6 MG: 3; 3 INJECTION, SUSPENSION INTRA-ARTICULAR; INTRALESIONAL; INTRAMUSCULAR at 10:05

## 2018-05-07 NOTE — TELEPHONE ENCOUNTER
----- Message from Allyson Bravo sent at 5/7/2018  3:28 PM CDT -----  Contact: Self/ [phone] or [phone]  Patient would like a letter sent out for her next appt to return to see frank KHOURY. And the patient would also like a call back to ask a few questions about information that she needs.

## 2018-05-07 NOTE — PROGRESS NOTES
SUBJECTIVE:     Chief Complaint & History of Present Illness:  Annika Mac is a Established patient 67 y.o. female who is seen here today with a complaint of    Chief Complaint   Patient presents with    Right Knee - Pain, Swelling    .  She is patient well known to me was last seen and treated in the clinic for this condition 4/23/2018.  She's done very well with her boot since her last visit her ankle is feeling much better although she still has some occasional swelling in the lateral malleolus she's remove it 2-3 times a day for active range of motion.  She continues to struggle with soreness in the knee and to a lesser degree the leg and hip region.  She is making good improvement  On a scale of 1-10, with 10 being worst pain imaginable, he rates this pain as 4 on good days and 6 on bad days.  she describes the pain as tender and sore.    Review of patient's allergies indicates:   Allergen Reactions    Iodinated contrast- oral and iv dye Rash         Current Outpatient Prescriptions   Medication Sig Dispense Refill    albuterol 90 mcg/actuation inhaler Inhale 2 puffs into the lungs every 6 (six) hours as needed for Wheezing. 1 Inhaler 0    allopurinol (ZYLOPRIM) 100 MG tablet Take 1 tablet (100 mg total) by mouth once daily. 90 tablet 3    amLODIPine (NORVASC) 5 MG tablet Take 1 tablet (5 mg total) by mouth once daily. 90 tablet 1    aspirin 81 MG Chew Take 1 tablet (81 mg total) by mouth once daily.  0    azelastine (ASTELIN) 137 mcg (0.1 %) nasal spray 1 spray (137 mcg total) by Nasal route 2 (two) times daily. 30 mL 11    blood-glucose meter kit Use as instructed, true test/result meter 1 each 0    DULoxetine (CYMBALTA) 30 MG capsule Take 1 capsule (30 mg total) by mouth once daily. 90 capsule 1    famotidine (PEPCID) 20 MG tablet Take 1 tablet (20 mg total) by mouth 2 (two) times daily. 1 tablet 0    ferrous sulfate 325 mg (65 mg iron) Tab tablet Take 1 tablet (325 mg total) by mouth 2 (two)  "times daily.  0    fexofenadine (ALLEGRA) 180 MG tablet TAKE 1 TABLET BY MOUTH ONCE DAILY 30 tablet 0    fluticasone (FLONASE) 50 mcg/actuation nasal spray 2 sprays by Each Nare route once daily. 48 g 6    furosemide (LASIX) 20 MG tablet Take 1 tablet (20 mg total) by mouth once daily. 90 tablet 2    hydrocodone-acetaminophen 10-325mg (NORCO)  mg Tab Take 1 tablet by mouth every 8 (eight) hours as needed for Pain. 90 tablet 0    insulin regular hum U-500 conc (HUMULIN R U-500, CONC, KWIKPEN) 500 unit/mL (3 mL) InPn Inject  Units into the skin 3 (three) times daily before meals. Take 115u with breakfast,85u with lunch, and 50u with dinner. 11 Syringe 4    irbesartan (AVAPRO) 300 MG tablet Take 0.5 tablets (150 mg total) by mouth every evening. 90 tablet 2    labetalol (NORMODYNE) 300 MG tablet Take 1 tablet (300 mg total) by mouth 2 (two) times daily. 180 tablet 2    lancets 31 gauge Misc 1 lancet by Misc.(Non-Drug; Combo Route) route 4 (four) times daily. 150 each 6    montelukast (SINGULAIR) 10 mg tablet TAKE 1 TABLET(10 MG) BY MOUTH EVERY EVENING 90 tablet 0    pen needle, diabetic 32 gauge x 5/32" Ndle Use with multiple daily insulin injections 100 each 12    pregabalin (LYRICA) 100 MG capsule TAKE 1 CAPSULE BY MOUTH TWO TIMES DAILY 180 capsule 1    simvastatin (ZOCOR) 40 MG tablet Take 0.5 tablets (20 mg total) by mouth every evening. 90 tablet 4    SITagliptin (JANUVIA) 50 MG Tab Take 1 tablet (50 mg total) by mouth once daily. 90 tablet 4    spironolactone (ALDACTONE) 25 MG tablet Take 0.5 tablets (12.5 mg total) by mouth once daily. 90 tablet 0    TRUETEST TEST STRIPS Strp 1 strip by Misc.(Non-Drug; Combo Route) route 4 (four) times daily. 400 strip 4    clobetasol (TEMOVATE) 0.05 % cream Apply topically 2 (two) times daily as needed. 30 g 1    nystatin (MYCOSTATIN) powder Apply topically 3 (three) times daily. 60 g 2     No current facility-administered medications for this " visit.        Past Medical History:   Diagnosis Date    Allergy     Anemia 2012    Arthritis     CHF (congestive heart failure)     CKD (chronic kidney disease) stage 3, GFR 30-59 ml/min 2012    Clotting disorder     Deep vein thrombophlebitis of left leg 2012    Degenerative disc disease     Diabetic peripheral neuropathy associated with type 2 diabetes mellitus 2012    GERD (gastroesophageal reflux disease)     HTN (hypertension) 2012    Hyperlipidemia 2012    Lead-induced gout of ankle or foot     right going on three weeks    Nonproliferative diabetic retinopathy of right eye 2012    Obstructive sleep apnea 2012    Osteoporosis     Proliferative diabetic retinopathy of left eye 2012    Stroke 2003    Type 2 diabetes mellitus with diabetic polyneuropathy 2012    Ulcer        Past Surgical History:   Procedure Laterality Date    CATARACT EXTRACTION W/  INTRAOCULAR LENS IMPLANT Left 3/2002    left     CATARACT EXTRACTION W/  INTRAOCULAR LENS IMPLANT Right     OD dr. olivares     SECTION      COLONOSCOPY N/A 2018    Procedure: COLONOSCOPY;  Surgeon: Faizan Mac MD;  Location: [facility] ([address] [place] [address]);  Service: Endoscopy;  Laterality: N/A;    CYST REMOVAL  2011    left side of face    EYE SURGERY Bilateral 2012    eye implants    GANGLION CYST EXCISION  2007    Right wrist    Pan Retinal Photocoagulation Bilateral     Dr. Monterroso (Proliferative Diabetic Retinopathy)    TOTAL ABDOMINAL HYSTERECTOMY  1982    TOTAL KNEE ARTHROPLASTY  2006    left    TRIGGER FINGER RELEASE  2009       Vital Signs (Most Recent)  Vitals:    18 0918   BP: 110/62   Pulse: 74           Review of Systems:  ROS:  Constitutional: no fever or chills  Eyes: no visual changes  ENT: no nasal congestion or sore throat  Respiratory: no cough or shortness of breath  Cardiovascular: no chest pain or palpitations, CHF,  "CAD, stroke  Gastrointestinal: no nausea or vomiting, tolerating diet, GERD  Genitourinary: no hematuria or dysuria, chronic kidney disease stage III  Integument/Breast: no rash or pruritis  Hematologic/Lymphatic: no easy bruising or lymphadenopathy  Musculoskeletal: positive for arthralgias, back pain, myalgias, neck pain, stiff joints and gouty arthritis, negative for muscle weakness  Neurological: no seizures or tremors  Behavioral/Psych: no auditory or visual hallucinations  Endocrine: no heat or cold intolerance, diabetes type 2 with neuropathy                OBJECTIVE:     PHYSICAL EXAM:  Height: 5' 5" (165.1 cm) Weight: (!) 141.1 kg (311 lb 1.1 oz), General Appearance: Well nourished, well developed, in no acute distress.  Neurological: Mood & affect are normal.  right  Knee Exam:  Knee Range of Motion:0-115 degrees flexion   Effusion:none  Condition of skin:intact  Location of tenderness: Globally   Strength:limited by pain  Stability:  Lachman: stable, LCL: stable, MCL: stable, PCL: stable and posteromedial (dial): stable  Varus /Valgus stress:   Mild varus  Hans:   negative/negative     right  Foot/Ankle     Skin: normal  Swelling: mild   Warmth: no warmth  Tenderness: moderate  ROM: 90 degrees dorsiflexion, 170 degrees plantarflexion, 25 degrees inversion and 30 degrees eversion  Strength: limited by pain  Gait: antalgic  Stability: anterior drawer: positive, exterior rotation test: negative, Lachman: positive and Cotton test: negative      tenderness to palpation at the talo fibular ligament and the tibionavicular ligament              ASSESSMENT/PLAN:     Plan: We discussed with the patient at length all the different treatment options available for  the knee including anti-inflammatories, acetaminophen, rest, ice, knee strengthening exercise, occasional cortisone injections for temporary relief, Viscosupplimentation injections, arthroscopic debridement osteotomy, and finally knee arthroplasty.   We " will discontinue the fracture boot today we'll place patient in a lace up ankle brace she can increase her ambulation weightbearing as tolerated.  She is to remove it 2-3 times a day fractured range of motion.  We'll proceed with cortisone injection of the right knee     The injection site was identified and the skin was prepared with a betadine solution. The   right  knee was injected with 1 ml of Celestone and 5 ml Lidocaine under sterile technique. Annika Mac tolerated the procedure well, she was advised to rest the knee today, ice and elevation. she did receive immediate relief of the pain in and about his knee she was told this would be short lived and is secondary to the lidocaine. she may have an increase in his discomfort tonight followed by steady improvement over the next several days. I may take 1-3 weeks following the injection to get the full benefit of the medication.  I will see her back in 3-6 months. Sooner if he has any problems or concerns.    Annika Mac was advised to monitor her blood sugars closely over the next several days. The steroid may cause a rise in them. If her glucose levels rise to a point she is uncomfortable or he is unable to control them is is to contact his PCP or go immediately to the emergency department.

## 2018-05-08 DIAGNOSIS — E11.43 DIABETIC AUTONOMIC NEUROPATHY ASSOCIATED WITH TYPE 2 DIABETES MELLITUS: ICD-10-CM

## 2018-05-08 DIAGNOSIS — M47.816 LUMBAR FACET ARTHROPATHY: ICD-10-CM

## 2018-05-08 DIAGNOSIS — M17.11 PRIMARY OSTEOARTHRITIS OF RIGHT KNEE: ICD-10-CM

## 2018-05-08 DIAGNOSIS — M54.40 CHRONIC LOW BACK PAIN WITH SCIATICA, SCIATICA LATERALITY UNSPECIFIED, UNSPECIFIED BACK PAIN LATERALITY: ICD-10-CM

## 2018-05-08 DIAGNOSIS — M48.061 SPINAL STENOSIS OF LUMBAR REGION, UNSPECIFIED WHETHER NEUROGENIC CLAUDICATION PRESENT: ICD-10-CM

## 2018-05-08 DIAGNOSIS — M25.561 ACUTE PAIN OF RIGHT KNEE: ICD-10-CM

## 2018-05-08 DIAGNOSIS — H26.493 PCO (POSTERIOR CAPSULAR OPACIFICATION), BILATERAL: ICD-10-CM

## 2018-05-08 DIAGNOSIS — G89.29 CHRONIC LOW BACK PAIN WITH SCIATICA, SCIATICA LATERALITY UNSPECIFIED, UNSPECIFIED BACK PAIN LATERALITY: ICD-10-CM

## 2018-05-08 NOTE — TELEPHONE ENCOUNTER
----- Message from Elizabeth Brower sent at 5/8/2018  9:15 AM CDT -----  Contact: self @ [phone] or [phone]  Pt is req a refill for pregabalin (LYRICA) 100 MG capsule.  Pt says she is out of medication and would like it sent in [facility] [address] [place] [address] [place] [address] [address] AT [facility] [phone] [facility] [address] [place] [address]  Phone: [phone] Fax: [phone]

## 2018-05-09 RX ORDER — PREGABALIN 100 MG/1
CAPSULE ORAL
Qty: 180 CAPSULE | Refills: 1 | Status: SHIPPED | OUTPATIENT
Start: 2018-05-09 | End: 2018-10-11 | Stop reason: SDUPTHER

## 2018-05-11 ENCOUNTER — TELEPHONE (OUTPATIENT)
Dept: ENDOCRINOLOGY | Facility: CLINIC | Age: 68
End: 2018-05-11

## 2018-05-11 NOTE — TELEPHONE ENCOUNTER
Received call from pt and pt stated that pt like to sent a Rx to [facility] . Also pt stated that she is on steroid for  2 weeks that cause her BG high. Instructed pt to how to take Victoza and advise to sent a BG  logs back in 2 weeks. Pt verbally understand.

## 2018-05-11 NOTE — TELEPHONE ENCOUNTER
----- Message from Ursula Mo NP sent at 2018  1:19 PM CDT -----  Please call patient with the followin. I sent in a prescription for victoza 0.6 mg daily and then 1.2 mg thereafter daily. This is not an insulin this is to help decrease her BG. Send more logs in 2 weeks.     Thank you,   Ursula

## 2018-05-11 NOTE — TELEPHONE ENCOUNTER
Called a pt and left brief message with  instructions for Rx for Victoza. Contact  no was provided.

## 2018-05-12 NOTE — TELEPHONE ENCOUNTER
I will not send this prescription of victoza  Patient is on januvia   Either d/c januvia and start victoza or continue januvia   Do not advise to have DDP4 and GLP 1 simultaneously

## 2018-05-14 NOTE — TELEPHONE ENCOUNTER
Spoke with the pt and advise her to stop taking Januvia and start taking victoza. Pt verbalized understand.

## 2018-05-21 ENCOUNTER — OFFICE VISIT (OUTPATIENT)
Dept: PODIATRY | Facility: CLINIC | Age: 68
End: 2018-05-21
Payer: MEDICARE

## 2018-05-21 ENCOUNTER — TELEPHONE (OUTPATIENT)
Dept: ENDOCRINOLOGY | Facility: CLINIC | Age: 68
End: 2018-05-21

## 2018-05-21 VITALS
DIASTOLIC BLOOD PRESSURE: 68 MMHG | WEIGHT: 293 LBS | BODY MASS INDEX: 48.82 KG/M2 | HEART RATE: 79 BPM | HEIGHT: 65 IN | SYSTOLIC BLOOD PRESSURE: 127 MMHG

## 2018-05-21 DIAGNOSIS — E11.49 TYPE II DIABETES MELLITUS WITH NEUROLOGICAL MANIFESTATIONS: Primary | ICD-10-CM

## 2018-05-21 DIAGNOSIS — B35.1 ONYCHOMYCOSIS DUE TO DERMATOPHYTE: ICD-10-CM

## 2018-05-21 DIAGNOSIS — L84 CORN OR CALLUS: ICD-10-CM

## 2018-05-21 PROCEDURE — 99499 UNLISTED E&M SERVICE: CPT | Mod: S$PBB,,, | Performed by: PODIATRIST

## 2018-05-21 PROCEDURE — 11721 DEBRIDE NAIL 6 OR MORE: CPT | Mod: Q9,PBBFAC,59 | Performed by: PODIATRIST

## 2018-05-21 PROCEDURE — 11721 DEBRIDE NAIL 6 OR MORE: CPT | Mod: 59,Q9,S$PBB, | Performed by: PODIATRIST

## 2018-05-21 PROCEDURE — 99999 PR PBB SHADOW E&M-EST. PATIENT-LVL II: CPT | Mod: PBBFAC,,, | Performed by: PODIATRIST

## 2018-05-21 PROCEDURE — 11056 PARNG/CUTG B9 HYPRKR LES 2-4: CPT | Mod: 59,Q9,PBBFAC | Performed by: PODIATRIST

## 2018-05-21 PROCEDURE — 11056 PARNG/CUTG B9 HYPRKR LES 2-4: CPT | Mod: Q9,S$PBB,, | Performed by: PODIATRIST

## 2018-05-21 PROCEDURE — 99212 OFFICE O/P EST SF 10 MIN: CPT | Mod: PBBFAC,25 | Performed by: PODIATRIST

## 2018-05-21 NOTE — TELEPHONE ENCOUNTER
Please advise patient that I reviewed her glucose logs. The glucoses from the last few days are in a pretty good range. I recommend she continue the same doses of U500 and if she is tolerating the Victoza without side effects in another week she can increase the Victoza dose to 1.2 mg daily.

## 2018-05-22 DIAGNOSIS — E78.2 MIXED HYPERLIPIDEMIA: ICD-10-CM

## 2018-05-22 DIAGNOSIS — N18.30 CKD (CHRONIC KIDNEY DISEASE) STAGE 3, GFR 30-59 ML/MIN: Primary | Chronic | ICD-10-CM

## 2018-05-22 RX ORDER — SIMVASTATIN 40 MG/1
20 TABLET, FILM COATED ORAL NIGHTLY
Qty: 90 TABLET | Refills: 1 | Status: SHIPPED | OUTPATIENT
Start: 2018-05-22 | End: 2019-06-13 | Stop reason: SDUPTHER

## 2018-05-22 RX ORDER — SPIRONOLACTONE 25 MG/1
12.5 TABLET ORAL DAILY
Qty: 90 TABLET | Refills: 0 | Status: SHIPPED | OUTPATIENT
Start: 2018-05-22 | End: 2018-08-21 | Stop reason: SDUPTHER

## 2018-05-22 RX ORDER — AMLODIPINE BESYLATE 5 MG/1
5 TABLET ORAL DAILY
Qty: 90 TABLET | Refills: 1 | Status: SHIPPED | OUTPATIENT
Start: 2018-05-22 | End: 2018-11-21 | Stop reason: SDUPTHER

## 2018-05-22 NOTE — TELEPHONE ENCOUNTER
Spoke with the pt and advise the pt that Dr. Pat reviewed her BG logs and from the last few days are in pretty range. So Bi Inman recommand her to continue the dose of U-500 and if she can tolerate victoza without any side effects increase up to  1.2 . Also pt did received her victoza refill last week. Pt verbalized understand.

## 2018-05-23 NOTE — PROGRESS NOTES
Subjective:      Patient ID: Annika Mac is a 67 y.o. female.    Chief Complaint: Diabetes Mellitus (dr lexy cotton 3/13/18) and Nail Care    Annika is a 67 y.o. female who presents to the clinic for evaluation and treatment of high risk feet. Annika has a past medical history of Allergy; Anemia (7/27/2012); Arthritis; CHF (congestive heart failure); CKD (chronic kidney disease) stage 3, GFR 30-59 ml/min (7/27/2012); Clotting disorder; Deep vein thrombophlebitis of left leg (7/27/2012); Degenerative disc disease; Diabetic peripheral neuropathy associated with type 2 diabetes mellitus (7/27/2012); GERD (gastroesophageal reflux disease); HTN (hypertension) (7/27/2012); Hyperlipidemia (7/27/2012); Lead-induced gout of ankle or foot; Nonproliferative diabetic retinopathy of right eye (7/27/2012); Obstructive sleep apnea (7/27/2012); Osteoporosis; Proliferative diabetic retinopathy of left eye (7/27/2012); Stroke (8/1/2003); Type 2 diabetes mellitus with diabetic polyneuropathy (7/27/2012); and Ulcer. The patient's chief complaint is long, thick toenailsThis patient has documented high risk feet requiring routine maintenance secondary to diabetes mellitis and those secondary complications of diabetes, as mentioned..       PCP: Lexy Cotton    Date Last Seen by PCP:   Chief Complaint   Patient presents with    Diabetes Mellitus     dr lexy cotton 3/13/18    Nail Care       Chief Complaint   Patient presents with    Diabetes Mellitus     dr lexy cotton 3/13/18    Nail Care       Current shoe gear: black leather DM shoes w/ custom inserts     Hemoglobin A1C   Date Value Ref Range Status   03/13/2018 9.0 (H) 4.0 - 5.6 % Final     Comment:     According to ADA guidelines, hemoglobin A1c <7.0% represents  optimal control in non-pregnant diabetic patients. Different  metrics may apply to specific patient populations.   Standards of Medical Care in Diabetes-2016.  For the purpose of screening for the presence of  diabetes:  <5.7%     Consistent with the absence of diabetes  5.7-6.4%  Consistent with increasing risk for diabetes   (prediabetes)  >or=6.5%  Consistent with diabetes  Currently, no consensus exists for use of hemoglobin A1c  for diagnosis of diabetes for children.  This Hemoglobin A1c assay has significant interference with fetal   hemoglobin   (HbF). The results are invalid for patients with abnormal amounts of   HbF,   including those with known Hereditary Persistence   of Fetal Hemoglobin. Heterozygous hemoglobin variants (HbAS, HbAC,   HbAD, HbAE, HbA2) do not significantly interfere with this assay;   however, presence of multiple variants in a sample may impact the %   interference.     01/12/2018 10.4 (H) 4.0 - 5.6 % Final     Comment:     According to ADA guidelines, hemoglobin A1c <7.0% represents  optimal control in non-pregnant diabetic patients. Different  metrics may apply to specific patient populations.   Standards of Medical Care in Diabetes-2016.  For the purpose of screening for the presence of diabetes:  <5.7%     Consistent with the absence of diabetes  5.7-6.4%  Consistent with increasing risk for diabetes   (prediabetes)  >or=6.5%  Consistent with diabetes  Currently, no consensus exists for use of hemoglobin A1c  for diagnosis of diabetes for children.  This Hemoglobin A1c assay has significant interference with fetal   hemoglobin   (HbF). The results are invalid for patients with abnormal amounts of   HbF,   including those with known Hereditary Persistence   of Fetal Hemoglobin. Heterozygous hemoglobin variants (HbAS, HbAC,   HbAD, HbAE, HbA2) do not significantly interfere with this assay;   however, presence of multiple variants in a sample may impact the %   interference.     10/06/2017 9.4 (H) 4.0 - 5.6 % Final     Comment:     According to ADA guidelines, hemoglobin A1c <7.0% represents  optimal control in non-pregnant diabetic patients. Different  metrics may apply to specific  patient populations.   Standards of Medical Care in Diabetes-2016.  For the purpose of screening for the presence of diabetes:  <5.7%     Consistent with the absence of diabetes  5.7-6.4%  Consistent with increasing risk for diabetes   (prediabetes)  >or=6.5%  Consistent with diabetes  Currently, no consensus exists for use of hemoglobin A1c  for diagnosis of diabetes for children.  This Hemoglobin A1c assay has significant interference with fetal   hemoglobin   (HbF). The results are invalid for patients with abnormal amounts of   HbF,   including those with known Hereditary Persistence   of Fetal Hemoglobin. Heterozygous hemoglobin variants (HbAS, HbAC,   HbAD, HbAE, HbA2) do not significantly interfere with this assay;   however, presence of multiple variants in a sample may impact the %   interference.         Review of Systems   Constitution: Negative for chills, decreased appetite and fever.   Cardiovascular: Negative for leg swelling.   Skin: Positive for dry skin and nail changes. Negative for flushing and itching.   Musculoskeletal: Positive for arthritis. Negative for back pain, gout, joint pain, joint swelling and myalgias.   Gastrointestinal: Negative for nausea and vomiting.   Neurological: Positive for numbness. Negative for loss of balance and paresthesias.           Objective:      Physical Exam   Constitutional: She is oriented to person, place, and time. She appears well-developed and well-nourished. No distress.   Cardiovascular:   Dorsalis pedis and posterior tibial pulses are diminished Bilaterally. Toes are cool to touch. Feet are warm proximally.There is decreased digital hair. Skin is atrophic, slightly hyperpigmented, and mildly edematous       Musculoskeletal: Normal range of motion. She exhibits no edema, tenderness or deformity.   Equinus noted b/l ankles, gastroc. Adequate joint range of motion without pain, limitation, nor crepitation Bilateral pedal joints. Muscle strength is 5/5 in  all groups bilaterally.        Neurological: She is alert and oriented to person, place, and time.   [place] 5.07 monofilamant testing is diminished [place] feet. Sharp/dull sensation diminished Bilaterally. Light touch absent Bilaterally.       Skin: Skin is warm, dry and intact. No abrasion, no bruising, no burn, no ecchymosis, no laceration, no lesion, no petechiae and no rash noted. She is not diaphoretic. No pallor.   Nails 1-5 b/l  are elongated by  2-3mm's, thickened by 3-5 mm's, dystrophic, and are darkened in  coloration . Xerosis Bilaterally. No open lesions noted.      Hyperkeratotic tissue noted to distal 2nd toe b/l      Psychiatric: She has a normal mood and affect. Thought content normal.   Nursing note and vitals reviewed.            Assessment:       Encounter Diagnoses   Name Primary?    Type II diabetes mellitus with neurological manifestations Yes    Onychomycosis due to dermatophyte     Corn or callus          Plan:       Annika was seen today for diabetes mellitus and nail care.    Diagnoses and all orders for this visit:    Type II diabetes mellitus with neurological manifestations    Onychomycosis due to dermatophyte    Corn or callus      I counseled the patient on her conditions, their implications and medical management.        - Shoe inspection. Diabetic Foot Education. Patient reminded of the importance of good nutrition and blood sugar control to help prevent podiatric complications of diabetes. Patient instructed on proper foot hygeine. We discussed wearing proper shoe gear, daily foot inspections, never walking without protective shoe gear, never putting sharp instruments to feet, routine podiatric nail visits every 2-3 months.      - With patient's permission, nails were aggressively reduced and debrided x 10 to their soft tissue attachment mechanically and with electric , removing all offending nail and debris. Patient relates relief following the procedure. She will  continue to monitor the areas daily, inspect her feet, wear protective shoe gear when ambulatory, moisturizer to maintain skin integrity and follow in this office in approximately 2-3 months, sooner p.r.n.    - After cleansing the  area w/ alcohol prep pad the above mentioned hyperkeratosis was trimmed utilizing No 15 scapel, to a smooth base with out incident. Patient tolerated this  well and reported comfort to the area of distal 2nd toe b/l

## 2018-05-28 ENCOUNTER — OFFICE VISIT (OUTPATIENT)
Dept: URGENT CARE | Facility: CLINIC | Age: 68
End: 2018-05-28
Payer: MEDICARE

## 2018-05-28 ENCOUNTER — TELEPHONE (OUTPATIENT)
Dept: URGENT CARE | Facility: CLINIC | Age: 68
End: 2018-05-28

## 2018-05-28 VITALS
DIASTOLIC BLOOD PRESSURE: 60 MMHG | HEART RATE: 95 BPM | SYSTOLIC BLOOD PRESSURE: 130 MMHG | BODY MASS INDEX: 50.26 KG/M2 | TEMPERATURE: 97 F | OXYGEN SATURATION: 98 % | WEIGHT: 293 LBS

## 2018-05-28 DIAGNOSIS — H60.502 ACUTE OTITIS EXTERNA OF LEFT EAR, UNSPECIFIED TYPE: Primary | ICD-10-CM

## 2018-05-28 PROCEDURE — 99999 PR PBB SHADOW E&M-EST. PATIENT-LVL III: CPT | Mod: PBBFAC,,, | Performed by: NURSE PRACTITIONER

## 2018-05-28 PROCEDURE — 99214 OFFICE O/P EST MOD 30 MIN: CPT | Mod: S$PBB,,, | Performed by: NURSE PRACTITIONER

## 2018-05-28 PROCEDURE — 99213 OFFICE O/P EST LOW 20 MIN: CPT | Mod: PBBFAC,PO | Performed by: NURSE PRACTITIONER

## 2018-05-28 RX ORDER — OFLOXACIN 3 MG/ML
5 SOLUTION AURICULAR (OTIC) DAILY
Qty: 50 DROP | Refills: 0 | Status: SHIPPED | OUTPATIENT
Start: 2018-05-28 | End: 2018-06-05 | Stop reason: SDUPTHER

## 2018-05-28 RX ORDER — CLOBETASOL PROPIONATE 0.5 MG/G
CREAM TOPICAL
Refills: 0 | COMMUNITY
Start: 2018-05-14 | End: 2018-06-18

## 2018-05-28 NOTE — TELEPHONE ENCOUNTER
----- Message from Sulma Baarjas sent at 5/28/2018  3:30 PM CDT -----  Contact: ms briggs-daughter  FYI:pls transfer rx to [facility]..[phone]

## 2018-05-28 NOTE — TELEPHONE ENCOUNTER
Spoke with pt daughter stated that she would like script sent to local pharmacy so she did have to go as far. Informed daughter give it an hour for . Daughter would like script called into [facility] [address] Their calling [facility] at [address]  [address]  [phone]  Phone system stated pharmacy is closed. Called pt daughter that pharmacy is closed. Daughter stated understanding.

## 2018-05-28 NOTE — PROGRESS NOTES
Subjective:       Patient ID: Annika Mac is a 67 y.o. female.    Chief Complaint: Ear Drainage    Pt is a 67 year old female to clinic today with complaints of left ear pain and drainage that began Saturday.       Ear Drainage    There is pain in the left ear. This is a new problem. The current episode started in the past 7 days. The problem occurs constantly. The problem has been unchanged. There has been no fever. The pain is at a severity of 8/10. The pain is severe. Associated symptoms include ear discharge and hearing loss. Pertinent negatives include no abdominal pain, coughing, diarrhea, headaches, neck pain, rash, rhinorrhea, sore throat or vomiting. Treatments tried: H2O2 to clean. The treatment provided no relief. Recent use of hearing device     Review of Systems   Constitutional: Negative for chills, diaphoresis, fatigue and fever.   HENT: Positive for ear discharge and hearing loss. Negative for congestion, rhinorrhea, sinus pressure and sore throat.    Eyes: Negative for pain.   Respiratory: Negative for cough, chest tightness, shortness of breath and wheezing.    Cardiovascular: Negative for chest pain and palpitations.   Gastrointestinal: Negative for abdominal pain, diarrhea, nausea and vomiting.   Genitourinary: Negative for dysuria.   Musculoskeletal: Negative for back pain, myalgias and neck pain.   Skin: Negative for rash.   Neurological: Negative for dizziness, light-headedness and headaches.       Objective:      Physical Exam   Constitutional: She is oriented to person, place, and time. She appears well-developed and well-nourished. No distress.   HENT:   Head: Normocephalic.   Right Ear: Tympanic membrane, external ear and ear canal normal. No tenderness. Tympanic membrane is not bulging.   Left Ear: Tympanic membrane and external ear normal. There is drainage, swelling and tenderness. Tympanic membrane is not bulging.   Nose: Nose normal.   Drainage, swelling and erythema to left  ear/canal. TTP     Eyes: Pupils are equal, round, and reactive to light.   Neurological: She is alert and oriented to person, place, and time.   Skin: Skin is warm and dry. No rash noted. She is not diaphoretic.   Psychiatric: She has a normal mood and affect. Her speech is normal and behavior is normal. Thought content normal.   Nursing note and vitals reviewed.      Assessment:       1. Acute otitis externa of left ear, unspecified type        Plan:   Acute otitis externa of left ear, unspecified type  -     ofloxacin (FLOXIN) 0.3 % otic solution; Place 5 drops into the left ear once daily.  Dispense: 50 drop; Refill: 0      Recommend keep hearing device clean both before and after use.    Follow prescribed treatment plan as directed.  Stay hydrated and rest.  Report to ER if symptoms worsen.  Follow up with PCP in 2-3 days or sooner if symptoms do not improve.

## 2018-05-28 NOTE — PATIENT INSTRUCTIONS

## 2018-05-29 ENCOUNTER — TELEPHONE (OUTPATIENT)
Dept: URGENT CARE | Facility: CLINIC | Age: 68
End: 2018-05-29

## 2018-05-29 NOTE — TELEPHONE ENCOUNTER
----- Message from Sulma Barajas sent at 5/29/2018  8:49 AM CDT -----  FYI:pls [facility] in [facility] ..[phone] (home)

## 2018-05-29 NOTE — TELEPHONE ENCOUNTER
Patient contacted and informed that prescription has been sent to pharmacy via e-prescription and is ready for pick-up. Patient states that she will go and get medication. Verbalized an understanding.

## 2018-05-31 ENCOUNTER — TELEPHONE (OUTPATIENT)
Dept: URGENT CARE | Facility: CLINIC | Age: 68
End: 2018-05-31

## 2018-05-31 ENCOUNTER — OFFICE VISIT (OUTPATIENT)
Dept: URGENT CARE | Facility: CLINIC | Age: 68
End: 2018-05-31
Payer: MEDICARE

## 2018-05-31 VITALS
DIASTOLIC BLOOD PRESSURE: 80 MMHG | WEIGHT: 293 LBS | BODY MASS INDEX: 50.26 KG/M2 | TEMPERATURE: 98 F | SYSTOLIC BLOOD PRESSURE: 120 MMHG | HEART RATE: 112 BPM | OXYGEN SATURATION: 97 %

## 2018-05-31 DIAGNOSIS — N18.4 TYPE 2 DM WITH CKD STAGE 4 AND HYPERTENSION: ICD-10-CM

## 2018-05-31 DIAGNOSIS — E11.22 TYPE 2 DM WITH CKD STAGE 4 AND HYPERTENSION: ICD-10-CM

## 2018-05-31 DIAGNOSIS — H60.502 ACUTE OTITIS EXTERNA OF LEFT EAR, UNSPECIFIED TYPE: Primary | ICD-10-CM

## 2018-05-31 DIAGNOSIS — I12.9 TYPE 2 DM WITH CKD STAGE 4 AND HYPERTENSION: ICD-10-CM

## 2018-05-31 PROCEDURE — 99213 OFFICE O/P EST LOW 20 MIN: CPT | Mod: PBBFAC,PO | Performed by: NURSE PRACTITIONER

## 2018-05-31 PROCEDURE — 99999 PR PBB SHADOW E&M-EST. PATIENT-LVL III: CPT | Mod: PBBFAC,,, | Performed by: NURSE PRACTITIONER

## 2018-05-31 PROCEDURE — 99214 OFFICE O/P EST MOD 30 MIN: CPT | Mod: S$PBB,,, | Performed by: NURSE PRACTITIONER

## 2018-05-31 PROCEDURE — 87070 CULTURE OTHR SPECIMN AEROBIC: CPT

## 2018-05-31 RX ORDER — CIPROFLOXACIN 500 MG/1
500 TABLET ORAL 2 TIMES DAILY
Qty: 14 TABLET | Refills: 0 | Status: SHIPPED | OUTPATIENT
Start: 2018-05-31 | End: 2018-05-31 | Stop reason: SDUPTHER

## 2018-05-31 RX ORDER — CIPROFLOXACIN 500 MG/1
500 TABLET ORAL DAILY
Qty: 7 TABLET | Refills: 0
Start: 2018-05-31 | End: 2018-06-07

## 2018-05-31 NOTE — PROGRESS NOTES
Subjective:      Patient ID: Annika Mac is a 67 y.o. female.    Chief Complaint: Otalgia    Otalgia    There is pain in the left ear. This is a new problem. The current episode started in the past 7 days. The problem occurs constantly. The problem has been gradually worsening. There has been no fever. Associated symptoms include hearing loss (muffled hearing). Pertinent negatives include no ear discharge. Treatments tried: ear drops (doesn't feel like the drops are going in) The treatment provided no relief. Her past medical history is significant for hearing loss (recently fitted for a hearing aid; not wearing the hearing aid since onset of symptoms).     Review of Systems   Constitutional: Negative for fever.   HENT: Positive for ear pain and hearing loss (muffled hearing). Negative for congestion, dental problem, ear discharge and facial swelling.    Respiratory: Negative.    Cardiovascular: Negative.    Gastrointestinal: Positive for nausea.   Musculoskeletal: Negative.    Neurological: Negative.    Hematological: Negative.        Objective:   /80 (BP Location: Left arm, Patient Position: Sitting, BP Method: Large (Manual))   Pulse (!) 112   Temp 97.7 °F (36.5 °C) (Tympanic)   Wt (!) 137 kg (302 lb 0.5 oz)   SpO2 97%   BMI 50.26 kg/m²   Physical Exam   Constitutional: She is oriented to person, place, and time. She appears well-developed and well-nourished. No distress.   HENT:   Head: Normocephalic and atraumatic.   Left Ear: There is swelling and tenderness. Decreased hearing is noted.   Ears:    Neck: Normal range of motion. Neck supple.   Cardiovascular: Normal rate.    Pulmonary/Chest: Effort normal. No respiratory distress.   Neurological: She is alert and oriented to person, place, and time.   Skin: Skin is warm and dry. She is not diaphoretic.   Nursing note and vitals reviewed.    Assessment:      1. Acute otitis externa of left ear, unspecified type    2. Type 2 DM with CKD stage 4 and  hypertension       Plan:   Acute otitis externa of left ear, unspecified type  -     Discontinue: ciprofloxacin HCl (CIPRO) 500 MG tablet; Take 1 tablet (500 mg total) by mouth 2 (two) times daily.  Dispense: 14 tablet; Refill: 0  -     CULTURE, AEROBIC  (SPECIFY SOURCE)  -     ciprofloxacin HCl (CIPRO) 500 MG tablet; Take 1 tablet (500 mg total) by mouth once daily.  Dispense: 7 tablet; Refill: 0    Type 2 DM with CKD stage 4 and hypertension    CrCl based on last set of labs is 50.3. Will use once daily dosing since she is borderline for the cutoff. Ms. Mac was notified via telephone by nursing staff of change from BID to QD x 7 days.  Instructions, follow up, and supportive care as per AVS.  Follow up with ENT as soon as possible.  ER for worsening.  Due to history of DM, will cover for suspected pseudomonas with cipro. This would also allow for some coverage of gram + pathogens. Will follow up when culture is available and make any necessary changes if any at that time.

## 2018-05-31 NOTE — TELEPHONE ENCOUNTER
Called pt to informed her ms sims would like for her to take the antibiotic cipro once a day for seven days. Pt informed that since her kidney function is borderline ms sims is changing the direction on the cipro. Take medication only once a day until finish medication. Pt stated understanding to take medication cipro once a day for seven days.

## 2018-06-01 NOTE — PATIENT INSTRUCTIONS
ENT at next soonest appointment available.   ER for worsening symptoms.   Leave the ear wick in place. It will fall out when the ear canal swelling goes down.   Avoid use of Q-tips to clean the ear.   We will follow up when the results of your ear culture are available.       External Ear Infection (Adult)    External otitis (also called swimmers ear) is an infection in the ear canal. It is often caused by bacteria or fungus. It can occur a few days after water gets trapped in the ear canal (from swimming or bathing). It can also occur after cleaning too deeply in the ear canal with a cotton swab or other object. Sometimes, hair care products get into the ear canal and cause this problem.  Symptoms can include pain, fever, itching, redness, drainage, or swelling of the ear canal. Temporary hearing loss may also occur.  Home care  · Do not try to clean the ear canal. This can push pus and bacteria deeper into the canal.  · Use prescribed ear drops as directed. These help reduce swelling and fight the infection. If an ear wick was placed in the ear canal, apply drops right onto the end of the wick. The wick will draw the medication into the ear canal even if it is swollen closed.  · A cotton ball may be loosely placed in the outer ear to absorb any drainage.  · You may use acetaminophen or ibuprofen to control pain, unless another medication was prescribed. Note: If you have chronic liver or kidney disease or ever had a stomach ulcer or GI bleeding, talk to your health care provider before taking any of these medications.  · Do not allow water to get into your ear when bathing. Also, avoid swimming until the infection has cleared.  Prevention  · Keep your ears dry. This helps lower the risk of infection. Dry your ears with a towel or hair dryer after getting wet. Also, use ear plugs when swimming.  · Do not stick any objects in the ear to remove wax.  · If you feel water trapped in your ear, use ear drops right  away. You can get these drops over the counter at most drugstores. They work by removing water from the ear canal.  Follow-up care  Follow up with your health care provider in one week, or as advised.  When to seek medical advice  Call your health care provider right away if any of these occur:  · Ear pain becomes worse or doesnt improve after 3 days of treatment  · Redness or swelling of the outer ear occurs or gets worse  · Headache  · Painful or stiff neck  · Drowsiness or confusion  · Fever of 100.4ºF (38ºC) or higher, or as directed by your health care provider  · Seizure  Date Last Reviewed: 3/22/2015  © 1280-4588 [employer]. [address] [place] [address], [place] [address]. All rights reserved. This information is not intended as a substitute for professional medical care. Always follow your healthcare professional's instructions.

## 2018-06-04 LAB — BACTERIA SPEC AEROBE CULT: NORMAL

## 2018-06-05 ENCOUNTER — OFFICE VISIT (OUTPATIENT)
Dept: OTOLARYNGOLOGY | Facility: CLINIC | Age: 68
End: 2018-06-05
Payer: MEDICARE

## 2018-06-05 ENCOUNTER — CLINICAL SUPPORT (OUTPATIENT)
Dept: AUDIOLOGY | Facility: CLINIC | Age: 68
End: 2018-06-05
Payer: MEDICARE

## 2018-06-05 VITALS
HEIGHT: 65 IN | SYSTOLIC BLOOD PRESSURE: 131 MMHG | BODY MASS INDEX: 48.82 KG/M2 | RESPIRATION RATE: 20 BRPM | HEART RATE: 74 BPM | TEMPERATURE: 98 F | WEIGHT: 293 LBS | DIASTOLIC BLOOD PRESSURE: 64 MMHG

## 2018-06-05 DIAGNOSIS — H60.502 ACUTE OTITIS EXTERNA OF LEFT EAR, UNSPECIFIED TYPE: Primary | ICD-10-CM

## 2018-06-05 DIAGNOSIS — H90.5 SENSORINEURAL HEARING LOSS (SNHL) OF LEFT EAR, UNSPECIFIED HEARING STATUS ON CONTRALATERAL SIDE: ICD-10-CM

## 2018-06-05 PROCEDURE — 99999 PR PBB SHADOW E&M-EST. PATIENT-LVL III: CPT | Mod: PBBFAC,,, | Performed by: PHYSICIAN ASSISTANT

## 2018-06-05 PROCEDURE — 99213 OFFICE O/P EST LOW 20 MIN: CPT | Mod: S$PBB,,, | Performed by: PHYSICIAN ASSISTANT

## 2018-06-05 PROCEDURE — 99213 OFFICE O/P EST LOW 20 MIN: CPT | Mod: PBBFAC,PO,25 | Performed by: PHYSICIAN ASSISTANT

## 2018-06-05 PROCEDURE — 92557 COMPREHENSIVE HEARING TEST: CPT | Mod: PBBFAC,PO | Performed by: AUDIOLOGIST

## 2018-06-05 PROCEDURE — 92567 TYMPANOMETRY: CPT | Mod: PBBFAC,PO | Performed by: AUDIOLOGIST

## 2018-06-05 RX ORDER — AMOXICILLIN 875 MG/1
875 TABLET, FILM COATED ORAL 2 TIMES DAILY
Qty: 20 TABLET | Refills: 0 | Status: SHIPPED | OUTPATIENT
Start: 2018-06-05 | End: 2018-06-15

## 2018-06-05 RX ORDER — OFLOXACIN 3 MG/ML
5 SOLUTION AURICULAR (OTIC) 2 TIMES DAILY
Qty: 50 DROP | Refills: 0 | Status: SHIPPED | OUTPATIENT
Start: 2018-06-05 | End: 2018-06-15

## 2018-06-05 NOTE — PROGRESS NOTES
Annika Mac was seen 06/05/2018 for an audiological evaluation.  Patient complains of otalgia in her left ear since she was fit with a new hearing aid at [employer].  She reports pain, stopped up sensation, and hearing loss.      Results reveal a mild-to-moderate sensorineural hearing loss 250-8000 Hz for the right ear, and  mild conductive hearing loss 250-1000 Hz, and mild to moderate SNHL 2-8kHz for the left ear.   Speech Reception Thresholds were  20 dBHL for the right ear and 25 dBHL for the left ear.   Word recognition scores were good for the right ear and fair for the left ear.   Tympanograms were Type A for the right ear and unable to obtain for the left ear.    Patient was counseled on the above findings.    REC:  ENT consult  Repeat audiogram upon ENT request

## 2018-06-05 NOTE — PROGRESS NOTES
Subjective:       Patient ID: Annika Mac is a 67 y.o. female.    Chief Complaint: Recurrent Otitis and Otalgia    Ms. Mac is a very pleasant 66 yo female here to see me today for the first time for left ear pain, swelling and drainage. She states that she was fitted with hearing aid by [facility] at the end of May. She began having ear ache /pain to left ear shortly after. She has been cleaning her hearing aids as instructed. She began having drainage form left ear. She saw her pcp and was initially started on antibiotic ear drops but had to have an oral antibiotic added. She has been taking both meds for a few days.       Review of Systems   Constitutional: Negative for chills, fatigue, fever and unexpected weight change.   HENT: Positive for ear discharge (left) and ear pain (left). Negative for congestion, dental problem, facial swelling, hearing loss, nosebleeds, postnasal drip, rhinorrhea, sinus pressure, sneezing, sore throat, tinnitus, trouble swallowing and voice change.    Eyes: Negative for redness, itching and visual disturbance.   Respiratory: Negative for cough, choking, shortness of breath and wheezing.    Cardiovascular: Negative for chest pain and palpitations.   Gastrointestinal: Negative for abdominal pain.        No reflux.   Musculoskeletal: Negative for gait problem.   Skin: Negative for rash.   Neurological: Negative for dizziness, light-headedness and headaches.       Objective:      Physical Exam   Constitutional: She is oriented to person, place, and time. She appears well-developed and well-nourished. No distress.   HENT:   Head: Normocephalic and atraumatic.   Right Ear: Tympanic membrane, external ear and ear canal normal. No middle ear effusion.   Left Ear: Tympanic membrane, external ear and ear canal normal. There is drainage (scant), swelling and tenderness.  No middle ear effusion. Decreased hearing is noted.   Nose: Nose normal. No mucosal edema,  rhinorrhea, nasal deformity or septal deviation. No epistaxis. Right sinus exhibits no maxillary sinus tenderness and no frontal sinus tenderness. Left sinus exhibits no maxillary sinus tenderness and no frontal sinus tenderness.   Mouth/Throat: Uvula is midline, oropharynx is clear and moist and mucous membranes are normal. Mucous membranes are not pale and not dry. No dental caries. No oropharyngeal exudate or posterior oropharyngeal erythema.   Eyes: Conjunctivae, EOM and lids are normal. Pupils are equal, round, and reactive to light. Right eye exhibits no chemosis. Left eye exhibits no chemosis. Right conjunctiva is not injected. Left conjunctiva is not injected. No scleral icterus. Right eye exhibits normal extraocular motion and no nystagmus. Left eye exhibits normal extraocular motion and no nystagmus.   Neck: Trachea normal and phonation normal. No tracheal tenderness present. No tracheal deviation present. No thyroid mass and no thyromegaly present.   Cardiovascular: Intact distal pulses.    Pulmonary/Chest: Effort normal. No stridor. No respiratory distress.   Abdominal: She exhibits no distension.   Lymphadenopathy:        Head (right side): No submental, no submandibular, no preauricular, no posterior auricular and no occipital adenopathy present.        Head (left side): No submental, no submandibular, no preauricular, no posterior auricular and no occipital adenopathy present.     She has no cervical adenopathy.   Neurological: She is alert and oriented to person, place, and time. No cranial nerve deficit.   Skin: Skin is warm and dry. No rash noted. No erythema.   Psychiatric: She has a normal mood and affect. Her behavior is normal.       Assessment:       1. Acute otitis externa of left ear, unspecified type    2. Sensorineural hearing loss (SNHL) of left ear, unspecified hearing status on contralateral side        Plan:       OE: Will treat acute infection with ofloxacin and change oral abx to  amoxil. Not enough drainage to culture. I will have her return in 10 days and repeat her audiogram.  SNHL: she did have an asymmetry, was unable to obtain tymp today secondary to pain. Will repeat at next visit.

## 2018-06-05 NOTE — LETTER
June 5, 2018      Niurka Hdz, NP  [address] [place] [address]           [facility]  [address]  [place] [address]  Phone: [phone]  Fax: [phone]          Patient: Annika Mac   MR Number: [id number]   YOB: 1950   Date of Visit: 6/5/2018       Dear Niurka Hdz:    Thank you for referring Annika Mac to me for evaluation. Attached you will find relevant portions of my assessment and plan of care.    If you have questions, please do not hesitate to call me. I look forward to following Annika Mac along with you.    Sincerely,    Linda Champion PA-C    Enclosure  CC:  No Recipients    If you would like to receive this communication electronically, please contact [email or web address] or [phone] to request more information on [employer] Link access.    For providers and/or their staff who would like to refer a patient to [facility], please contact us through our one-stop-shop provider referral line, [facility] , at [phone].    If you feel you have received this communication in error or would no longer like to receive these types of communications, please e-mail [email or web address]

## 2018-06-06 NOTE — TELEPHONE ENCOUNTER
----- Message from Allegra Coello sent at 6/6/2018 12:17 PM CDT -----  Contact: Self  rx over [facility] 6[facility]     ..Rx Refill/Request     Is this a Refill or New Rx:  Refill  Rx Name and Strength:   liraglutide VICTOZA 2-MILAN SUBQ  Preferred Pharmacy with phone number: [facility]  Phone: [phone] Fax: [phone]  Communication Preference: [phone]  Additional Information:   Please fill by end or week says take 5 days to process.  .

## 2018-06-08 ENCOUNTER — TELEPHONE (OUTPATIENT)
Dept: ENDOCRINOLOGY | Facility: CLINIC | Age: 68
End: 2018-06-08

## 2018-06-08 ENCOUNTER — LAB VISIT (OUTPATIENT)
Dept: LAB | Facility: HOSPITAL | Age: 68
End: 2018-06-08
Payer: MEDICARE

## 2018-06-08 DIAGNOSIS — E11.22 TYPE 2 DM WITH CKD STAGE 4 AND HYPERTENSION: ICD-10-CM

## 2018-06-08 DIAGNOSIS — N18.4 TYPE 2 DM WITH CKD STAGE 4 AND HYPERTENSION: ICD-10-CM

## 2018-06-08 DIAGNOSIS — I12.9 TYPE 2 DM WITH CKD STAGE 4 AND HYPERTENSION: ICD-10-CM

## 2018-06-08 LAB
ESTIMATED AVG GLUCOSE: 229 MG/DL
HBA1C MFR BLD HPLC: 9.6 %

## 2018-06-08 PROCEDURE — 36415 COLL VENOUS BLD VENIPUNCTURE: CPT

## 2018-06-08 PROCEDURE — 82985 ASSAY OF GLYCATED PROTEIN: CPT

## 2018-06-08 PROCEDURE — 83036 HEMOGLOBIN GLYCOSYLATED A1C: CPT

## 2018-06-11 LAB — FRUCTOSAMINE SERPL-SCNC: 512 UMOL /L

## 2018-06-13 NOTE — PROGRESS NOTES
"CC: This 67 y.o. female presents for management of Diabetes Mellitus    HPI: Diagnosed with T2DM in July 1987 when c/o polydipsia and polyuria. Started oral agents then NPH and Regular insulin. Converted to MDI with Lantus and Novolog in 2/2006 after knee surgery. D/c TZD r/t weight gain 7/08 and added Symlin. Stopped Symlin 2/09. Januvia added 2/10. D/C Januvia 12/10 r/t cost; resumed though pt assistance in 2016. Converted to U500 in 7/10.   Received DM Education 1/22/08.   11/2013 she experienced severe hypoglycemia at home,  called 911, "nonresponsive" so brought to local ED for hypoglycemia BG 30s in 11/2013.     Medical conditions also include obesity, RUFINA, diastolic dysfunction, h/o stroke, PAD, and CKD 3.  Follows with cardiology, vascular medicine, and nephrology.   Also has chronic back pain, which limits activity and contributes to wt gain. Has followed with physical medicine.   Has chronic cough 2/2 GERD.   DM complicated with PDR with ME, PN.    Since her last visit has had to take steroid injections and had a serious ear infection. Then had to take steroid packs, last dose was yesterday. And BG seem to be improving now. She sent me logs last week and I increased her insulin regimen.     Results for [facility] DEWEY (MRN [id number]) as of 6/15/2018 08:55   Ref. Range 3/13/2018 11:32 4/23/2018 10:20 6/8/2018 08:15   Hemoglobin A1C Latest Ref Range: 4.0 - 5.6 % 9.0 (H)  9.6 (H)   Estimated Avg Glucose Latest Ref Range: 68 - 131 mg/dL 212 (H)  229 (H)     She is again on U500 as Overall BGs were markedly elevated.    Readings Check 3-4x daily.        She has been working on her diet as well.   Denies hypoglycemia.  Knows how to correct with 15 grams of carbs- juice, coke, or a peppermint.     DM ed reports to have good knowledge of CHO portions.     CURRENT DM MEDS:   Humulin U500 pen: breakfast dose 140-120 U for smaller breakfast, lunch dose 100 ufor bigger lunch increase to 90U; dinner dose 65units/ for " "smaller dinner, decrease dose to 50 units.   Victoza 1.2 mg (started on may 15th)      Denies missed doses.  Has Medicare Extra Help    Eats 3 meals daily, + snacks  Drinks water, unsweet tea.   No formal exercise.     Standards of Care:  Eye exam: 10/17  Podiatry: 5/2018    Denies history of pancreatitis & personal/family history of medullary thyroid cancer.     ROS:   Gen: Appetite good, denies fatigue and weakness.  Skin: No rashes, no hair changes.  Eyes: Denies visual disturbances  Resp: no SOB or MCFADDEN, no cough today  Cardiac: No palpitations, chest pain, no edema   GI: No nausea or vomiting, diarrhea, constipation, or abdominal pain.  MS/Neuro: Denies numbness/ tingling in BLE; Gait steady, speech clear  Chronic back pain.   Psych: Denies drug/ETOH abuse, no hx of depression.  Other systems: negative.    PE: /60   Pulse 74   Ht 5' 5" (1.651 m)   Wt (!) 138.9 kg (306 lb 3.5 oz)   BMI 50.96 kg/m²     GENERAL: Well developed, well nourished.  PSYCH: AAOx3, appropriate mood and affect, pleasant expression, conversant, appears relaxed, well groomed.   EYES: Conjunctiva, corneas clear  NECK: Supple, trachea midline.  CHEST: Resp even and unlabored  CARDIAC: RRR, +2/6 murmur lower left sternal border  ABDOMEN: Soft, non-tender, non-distended; +BSs x4  VASCULAR: DP pulses +2/4 bilaterally, mild edema.  NEURO: Gait steady  SKIN: Skin warm and dry, + acanthosis nigracans.  FEET:  Appropriate footwear, Foot exam deferred, done 05/2018  Injection sites are ok. No lipo hypertropthy or atrophy.     Hemoglobin A1C   Date Value Ref Range Status   06/08/2018 9.6 (H) 4.0 - 5.6 % Final     Comment:     ADA Screening Guidelines:  5.7-6.4%  Consistent with prediabetes  >or=6.5%  Consistent with diabetes  High levels of fetal hemoglobin interfere with the HbA1C  assay. Heterozygous hemoglobin variants (HbS, HgC, etc)do  not significantly interfere with this assay.   However, presence of multiple variants may affect " accuracy.     03/13/2018 9.0 (H) 4.0 - 5.6 % Final     Comment:     According to ADA guidelines, hemoglobin A1c <7.0% represents  optimal control in non-pregnant diabetic patients. Different  metrics may apply to specific patient populations.   Standards of Medical Care in Diabetes-2016.  For the purpose of screening for the presence of diabetes:  <5.7%     Consistent with the absence of diabetes  5.7-6.4%  Consistent with increasing risk for diabetes   (prediabetes)  >or=6.5%  Consistent with diabetes  Currently, no consensus exists for use of hemoglobin A1c  for diagnosis of diabetes for children.  This Hemoglobin A1c assay has significant interference with fetal   hemoglobin   (HbF). The results are invalid for patients with abnormal amounts of   HbF,   including those with known Hereditary Persistence   of Fetal Hemoglobin. Heterozygous hemoglobin variants (HbAS, HbAC,   HbAD, HbAE, HbA2) do not significantly interfere with this assay;   however, presence of multiple variants in a sample may impact the %   interference.     01/12/2018 10.4 (H) 4.0 - 5.6 % Final     Comment:     According to ADA guidelines, hemoglobin A1c <7.0% represents  optimal control in non-pregnant diabetic patients. Different  metrics may apply to specific patient populations.   Standards of Medical Care in Diabetes-2016.  For the purpose of screening for the presence of diabetes:  <5.7%     Consistent with the absence of diabetes  5.7-6.4%  Consistent with increasing risk for diabetes   (prediabetes)  >or=6.5%  Consistent with diabetes  Currently, no consensus exists for use of hemoglobin A1c  for diagnosis of diabetes for children.  This Hemoglobin A1c assay has significant interference with fetal   hemoglobin   (HbF). The results are invalid for patients with abnormal amounts of   HbF,   including those with known Hereditary Persistence   of Fetal Hemoglobin. Heterozygous hemoglobin variants (HbAS, HbAC,   HbAD, HbAE, HbA2) do not  significantly interfere with this assay;   however, presence of multiple variants in a sample may impact the %   interference.       ASSESSMENT and PLAN:  1. Type 2 DM with CKD stage 4 and hypertension  Hemoglobin A1c    Comprehensive metabolic panel    Fructosamine   2. Type 2 diabetes mellitus with diabetic polyneuropathy, with long-term current use of insulin     3. 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 Polyneuropathy associated with underlying disease     5. Dyslipidemia     6. Essential hypertension     7. Class 3 severe obesity due to excess calories with serious comorbidity and body mass index (BMI) of 50.0 to 59.9 in adult     8. Anemia, unspecified type        1T2DM: uncontrolled, with neuropathy, retinopathy, nephropathy.   -- Reviewed goals of therapy are to get the best control we can without hypoglycemia  -- Since we just increased doses last week Continue u500 doses & victoza   -- Reviewed patient's current insulin regimen. Clarified proper insulin dose and timing in relation to meals, etc. Insulin injection sites and proper rotation instructed.    -- Advised frequent self blood glucose monitoring.  Patient encouraged to document glucose results and bring them to every clinic visit. Send logs in 1 month.  -- Hypoglycemia precautions discussed. Instructed on precautions before driving.    -- Call for Bg repeatedly < 90 or > 180.   -- Close adherence to lifestyle changes recommended.   -- Periodic follow ups for eye evaluations, foot care and dental care suggested.     - takes ASA, ARB, statin  - Urine at next visit.      2. PDR with macular edema - optimize BG. Continue follow with Ophth.       3. CKD stage III - GFR 35.2, on ACEi therapy. No metformin or SLGT2i. Saw nephrology 03/2018.     4. Peripheral neuropathy - on Lyrica and Cymbalta with relief.      5. HLP - Controlled.  Continue statin.      6. Morbid obesity   Increases insulin resistance. Chronic pain limits physical activity.  Discussed medical wt loss. She is not interested in wt management at this time.     7. HTN - Controlled. On ARB.      Anemia  -- can alter a1c levels, check fructosamine as well.     Follow-up in about 3 months (around 9/15/2018).   Labs prior

## 2018-06-15 ENCOUNTER — OFFICE VISIT (OUTPATIENT)
Dept: ENDOCRINOLOGY | Facility: CLINIC | Age: 68
End: 2018-06-15
Payer: MEDICARE

## 2018-06-15 VITALS
WEIGHT: 293 LBS | SYSTOLIC BLOOD PRESSURE: 138 MMHG | DIASTOLIC BLOOD PRESSURE: 60 MMHG | HEIGHT: 65 IN | HEART RATE: 74 BPM | BODY MASS INDEX: 48.82 KG/M2

## 2018-06-15 DIAGNOSIS — N18.4 TYPE 2 DM WITH CKD STAGE 4 AND HYPERTENSION: Primary | ICD-10-CM

## 2018-06-15 DIAGNOSIS — I12.9 TYPE 2 DM WITH CKD STAGE 4 AND HYPERTENSION: Primary | ICD-10-CM

## 2018-06-15 DIAGNOSIS — I10 ESSENTIAL HYPERTENSION: ICD-10-CM

## 2018-06-15 DIAGNOSIS — E66.01 CLASS 3 SEVERE OBESITY DUE TO EXCESS CALORIES WITH SERIOUS COMORBIDITY AND BODY MASS INDEX (BMI) OF 50.0 TO 59.9 IN ADULT: ICD-10-CM

## 2018-06-15 DIAGNOSIS — Z79.4 TYPE 2 DIABETES MELLITUS WITH DIABETIC POLYNEUROPATHY, WITH LONG-TERM CURRENT USE OF INSULIN: ICD-10-CM

## 2018-06-15 DIAGNOSIS — E78.5 DYSLIPIDEMIA: Chronic | ICD-10-CM

## 2018-06-15 DIAGNOSIS — E11.42 TYPE 2 DIABETES MELLITUS WITH DIABETIC POLYNEUROPATHY, WITH LONG-TERM CURRENT USE OF INSULIN: ICD-10-CM

## 2018-06-15 DIAGNOSIS — G63 POLYNEUROPATHY ASSOCIATED WITH UNDERLYING DISEASE: ICD-10-CM

## 2018-06-15 DIAGNOSIS — E11.22 TYPE 2 DM WITH CKD STAGE 4 AND HYPERTENSION: Primary | ICD-10-CM

## 2018-06-15 DIAGNOSIS — D64.9 ANEMIA, UNSPECIFIED TYPE: Chronic | ICD-10-CM

## 2018-06-15 PROCEDURE — 99999 PR PBB SHADOW E&M-EST. PATIENT-LVL III: CPT | Mod: PBBFAC,,, | Performed by: NURSE PRACTITIONER

## 2018-06-15 PROCEDURE — 99213 OFFICE O/P EST LOW 20 MIN: CPT | Mod: PBBFAC | Performed by: NURSE PRACTITIONER

## 2018-06-15 PROCEDURE — 99214 OFFICE O/P EST MOD 30 MIN: CPT | Mod: S$PBB,,, | Performed by: NURSE PRACTITIONER

## 2018-06-18 ENCOUNTER — OFFICE VISIT (OUTPATIENT)
Dept: OTOLARYNGOLOGY | Facility: CLINIC | Age: 68
End: 2018-06-18
Payer: MEDICARE

## 2018-06-18 ENCOUNTER — CLINICAL SUPPORT (OUTPATIENT)
Dept: AUDIOLOGY | Facility: CLINIC | Age: 68
End: 2018-06-18
Payer: MEDICARE

## 2018-06-18 VITALS — TEMPERATURE: 98 F

## 2018-06-18 DIAGNOSIS — H60.502 ACUTE OTITIS EXTERNA OF LEFT EAR, UNSPECIFIED TYPE: Primary | ICD-10-CM

## 2018-06-18 DIAGNOSIS — H90.5 SENSORINEURAL HEARING LOSS (SNHL) OF LEFT EAR, UNSPECIFIED HEARING STATUS ON CONTRALATERAL SIDE: ICD-10-CM

## 2018-06-18 PROCEDURE — 99213 OFFICE O/P EST LOW 20 MIN: CPT | Mod: PBBFAC | Performed by: PHYSICIAN ASSISTANT

## 2018-06-18 PROCEDURE — 92567 TYMPANOMETRY: CPT | Mod: PBBFAC | Performed by: AUDIOLOGIST-HEARING AID FITTER

## 2018-06-18 PROCEDURE — 99213 OFFICE O/P EST LOW 20 MIN: CPT | Mod: S$PBB,,, | Performed by: PHYSICIAN ASSISTANT

## 2018-06-18 PROCEDURE — 92557 COMPREHENSIVE HEARING TEST: CPT | Mod: PBBFAC | Performed by: AUDIOLOGIST-HEARING AID FITTER

## 2018-06-18 PROCEDURE — 99999 PR PBB SHADOW E&M-EST. PATIENT-LVL III: CPT | Mod: PBBFAC,,, | Performed by: PHYSICIAN ASSISTANT

## 2018-06-18 NOTE — PROGRESS NOTES
Subjective:       Patient ID: Annika Mac is a 67 y.o. female.    Chief Complaint: Follow-up    Ms. Mac is a very pleasant 68 yo female here to see me today for follow up of left ear pain, swelling and drainage. I initially saw her on 6/5/18 and was found to have an OE. I started her on antibiotic ear drops and oral antibiotics and her symptoms are much improved today. She was fitted with hearing aid by [facility] at the end of May and  began having ear ache /pain to left ear shortly after. She has been cleaning her hearing aids as instructed. She began having drainage form left ear. She saw her pcp and was initially started on antibiotic ear drops but had to have an oral antibiotic added. She has been taking both meds for a few days.       Review of Systems   Constitutional: Negative for chills, fatigue, fever and unexpected weight change.   HENT: Negative for congestion, dental problem, ear discharge, ear pain, facial swelling, hearing loss, nosebleeds, postnasal drip, rhinorrhea, sinus pressure, sneezing, sore throat, tinnitus, trouble swallowing and voice change.    Eyes: Negative for redness, itching and visual disturbance.   Respiratory: Negative for cough, choking, shortness of breath and wheezing.    Cardiovascular: Negative for chest pain and palpitations.   Gastrointestinal: Negative for abdominal pain.        No reflux.   Musculoskeletal: Negative for gait problem.   Skin: Negative for rash.   Neurological: Negative for dizziness, light-headedness and headaches.       Objective:      Physical Exam   Constitutional: She is oriented to person, place, and time. She appears well-developed and well-nourished. No distress.   HENT:   Head: Normocephalic and atraumatic.   Right Ear: Tympanic membrane, external ear and ear canal normal.   Left Ear: Tympanic membrane, external ear and ear canal normal.   Nose: Nose normal. No mucosal edema, rhinorrhea, nasal deformity or septal  deviation. No epistaxis. Right sinus exhibits no maxillary sinus tenderness and no frontal sinus tenderness. Left sinus exhibits no maxillary sinus tenderness and no frontal sinus tenderness.   Mouth/Throat: Uvula is midline, oropharynx is clear and moist and mucous membranes are normal. Mucous membranes are not pale and not dry. No dental caries. No oropharyngeal exudate or posterior oropharyngeal erythema.   Eyes: Conjunctivae, EOM and lids are normal. Pupils are equal, round, and reactive to light. Right eye exhibits no chemosis. Left eye exhibits no chemosis. Right conjunctiva is not injected. Left conjunctiva is not injected. No scleral icterus. Right eye exhibits normal extraocular motion and no nystagmus. Left eye exhibits normal extraocular motion and no nystagmus.   Neck: Trachea normal and phonation normal. No tracheal tenderness present. No tracheal deviation present. No thyroid mass and no thyromegaly present.   Cardiovascular: Intact distal pulses.    Pulmonary/Chest: Effort normal. No stridor. No respiratory distress.   Abdominal: She exhibits no distension.   Lymphadenopathy:        Head (right side): No submental, no submandibular, no preauricular, no posterior auricular and no occipital adenopathy present.        Head (left side): No submental, no submandibular, no preauricular, no posterior auricular and no occipital adenopathy present.     She has no cervical adenopathy.   Neurological: She is alert and oriented to person, place, and time. No cranial nerve deficit.   Skin: Skin is warm and dry. No rash noted. No erythema.   Psychiatric: She has a normal mood and affect. Her behavior is normal.     SNHL AU - no conducive component today              Assessment:       1. Acute otitis externa of left ear, unspecified type    2. Sensorineural hearing loss (SNHL) of left ear, unspecified hearing status on contralateral side        Plan:       1.AOE: resolved. Complete course of antibiotics. I believe  that her symptoms may have been caused by poor fitting hearing aids. She will set up an appointment for adjustment. RTC in 6 months or sooner if needed.    2. SNHL: as stated above, she will have her HA adjusted. See today's audiogram above.  No conductive component today.

## 2018-06-18 NOTE — PROGRESS NOTES
Annika Mac was seen 06/18/2018 for an audiological evaluation prior to follow-up with Linda Champion PA-C for left otitis externa.  Patient reports symptoms are improved. She has known hearing loss and wears a Resound DEBORA in the left ear, fitted May 2018 at [facility] She [facility].     Results reveal a mild sensorineural hearing loss 250-8000 Hz for the right ear, and  mild-to-moderate sensorineural hearing loss 250-8000 Hz for the left ear.   Speech Reception Thresholds were  35 dBHL for the right ear and 45 dBHL for the left ear.   Word recognition scores were excellent for the right ear and excellent for the left ear.   Tympanograms were Type A for the right ear and Type As for the left ear.    Since her previous audiogram on 06/05/2018, conductive component is resolved.   Patient was counseled on the above findings.    Recommendations:  1. ENT  2. Continue with hearing aid use for the left ear

## 2018-07-08 ENCOUNTER — NURSE TRIAGE (OUTPATIENT)
Dept: ADMINISTRATIVE | Facility: CLINIC | Age: 68
End: 2018-07-08

## 2018-07-08 NOTE — TELEPHONE ENCOUNTER
Pt wants to know if [facility] open today - open until 3pm.   Reason for Disposition   General information question, no triage required and triager able to answer question    Protocols used: ST INFORMATION ONLY CALL-A-AH

## 2018-07-10 ENCOUNTER — TELEPHONE (OUTPATIENT)
Dept: ORTHOPEDICS | Facility: CLINIC | Age: 68
End: 2018-07-10

## 2018-07-10 NOTE — TELEPHONE ENCOUNTER
I spoke to the pt letting her know that [facility] schedule is full all this week. The pt understood and stated that she would go to the ER.

## 2018-07-11 ENCOUNTER — HOSPITAL ENCOUNTER (OUTPATIENT)
Dept: RADIOLOGY | Facility: HOSPITAL | Age: 68
Discharge: HOME | End: 2018-07-11
Attending: PHYSICAL MEDICINE & REHABILITATION
Payer: MEDICARE

## 2018-07-11 ENCOUNTER — OFFICE VISIT (OUTPATIENT)
Dept: PHYSICAL MEDICINE AND REHAB | Facility: CLINIC | Age: 68
End: 2018-07-11
Payer: MEDICARE

## 2018-07-11 VITALS
DIASTOLIC BLOOD PRESSURE: 68 MMHG | SYSTOLIC BLOOD PRESSURE: 128 MMHG | BODY MASS INDEX: 48.82 KG/M2 | HEART RATE: 85 BPM | WEIGHT: 293 LBS | HEIGHT: 65 IN

## 2018-07-11 DIAGNOSIS — M25.522 ELBOW PAIN, LEFT: ICD-10-CM

## 2018-07-11 DIAGNOSIS — G89.29 CHRONIC LOW BACK PAIN WITH SCIATICA, SCIATICA LATERALITY UNSPECIFIED, UNSPECIFIED BACK PAIN LATERALITY: ICD-10-CM

## 2018-07-11 DIAGNOSIS — M17.11 PRIMARY OSTEOARTHRITIS OF RIGHT KNEE: ICD-10-CM

## 2018-07-11 DIAGNOSIS — M54.40 CHRONIC LOW BACK PAIN WITH SCIATICA, SCIATICA LATERALITY UNSPECIFIED, UNSPECIFIED BACK PAIN LATERALITY: ICD-10-CM

## 2018-07-11 DIAGNOSIS — M10.9 ACUTE GOUT OF LEFT ELBOW, UNSPECIFIED CAUSE: Primary | ICD-10-CM

## 2018-07-11 DIAGNOSIS — M47.816 LUMBAR FACET ARTHROPATHY: ICD-10-CM

## 2018-07-11 DIAGNOSIS — E11.43 DIABETIC AUTONOMIC NEUROPATHY ASSOCIATED WITH TYPE 2 DIABETES MELLITUS: ICD-10-CM

## 2018-07-11 DIAGNOSIS — M10.9 ACUTE GOUT OF LEFT ELBOW, UNSPECIFIED CAUSE: ICD-10-CM

## 2018-07-11 PROCEDURE — 99214 OFFICE O/P EST MOD 30 MIN: CPT | Mod: S$PBB,,, | Performed by: PHYSICAL MEDICINE & REHABILITATION

## 2018-07-11 PROCEDURE — 99213 OFFICE O/P EST LOW 20 MIN: CPT | Mod: PBBFAC,25 | Performed by: PHYSICAL MEDICINE & REHABILITATION

## 2018-07-11 PROCEDURE — 73070 X-RAY EXAM OF ELBOW: CPT | Mod: TC,LT

## 2018-07-11 PROCEDURE — 73070 X-RAY EXAM OF ELBOW: CPT | Mod: 26,LT,, | Performed by: RADIOLOGY

## 2018-07-11 PROCEDURE — 99999 PR PBB SHADOW E&M-EST. PATIENT-LVL III: CPT | Mod: PBBFAC,,, | Performed by: PHYSICAL MEDICINE & REHABILITATION

## 2018-07-11 RX ORDER — COLCHICINE 0.6 MG/1
TABLET ORAL
Qty: 30 TABLET | Refills: 11 | Status: SHIPPED | OUTPATIENT
Start: 2018-07-11 | End: 2018-07-13 | Stop reason: SDUPTHER

## 2018-07-11 RX ORDER — HYDROCODONE BITARTRATE AND ACETAMINOPHEN 10; 325 MG/1; MG/1
1 TABLET ORAL EVERY 8 HOURS PRN
Qty: 90 TABLET | Refills: 0 | Status: SHIPPED | OUTPATIENT
Start: 2018-07-11 | End: 2018-08-10

## 2018-07-11 RX ORDER — HYDROCODONE BITARTRATE AND ACETAMINOPHEN 10; 325 MG/1; MG/1
1 TABLET ORAL EVERY 8 HOURS PRN
Qty: 90 TABLET | Refills: 0 | Status: SHIPPED | OUTPATIENT
Start: 2018-08-11 | End: 2018-08-21

## 2018-07-11 NOTE — PROGRESS NOTES
Subjective:       Patient ID: Annika Mac is a 67 y.o. female.    Chief Complaint: Elbow Pain; Arm Pain; and Back Pain    Knee Pain    Pertinent negatives include no numbness.   Back Pain   Associated symptoms include leg pain. Pertinent negatives include no abdominal pain, chest pain, fever, numbness or weakness. Headaches:     Arm Pain    Pertinent negatives include no chest pain or numbness.   Leg Pain    Pertinent negatives include no numbness.   Elbow Pain   Pertinent negatives include no abdominal pain, arthralgias, chest pain, chills, fatigue, fever, joint swelling, myalgias, numbness, rash or weakness. Headaches:     Patient is 66 y/o female , who returns to clinic for chronic back pain, and a new acute Lt elbow pain.   LCV 02/21/18.  Today, she complains about acute Lt elbow pain, for which she went to ED, on 7/8/18.      #1 left elbow pain  Current knee pain is 8/10, worst pan is 8/10, best is 3/10 with meds.   #2 back pain, leg pain,   Current back pain is  6,  worst pain are7/10, best is 3-4 with meds.      #1 Left elbow pain:  NO injury,. It started in afternoon at 7/5, and got worse  On SUnday 7/8  Pain intensity: Current pain is 8/10, the worst pain is 8/10.  Pain Duration: since 7/6/18.  Pain Timing: constant  Pain Location localized in left elbow, that is swollen, and shoots up and down the left arm.  No neck pain, no shoulder blade pain, no shoulder pain.   Pain Quality: dull throbbing pain   Pain Context: worsening  Pain Severity: severe  Modifying Factors:    Worsening factors:  Stretching   Alleviating factors:  bending in elbow  Associated Signs and Symptoms:  Impaired left arm/ hand, cannot dress.     #2 back pain, leg pain,   Current back pain is  6,  worst pain are7/10, best is 3-4.  Pain is present at all time, accross lower back in midline of tail bone. Back pain is more dull pain, and leg pain is more shooting pain art the top of thighs, and bellow the knee.   Pain is present daytime,  "and radiates to both legs on front of legs.Cannot walk <  ft using a cane,   stands straight < 5 minutes.   She has to lean forward even with cane or during walking.   Back pain increases, and leg become weak.   She has diabetic neuropathy in both feet, top and bottom, tingling pain.  Does not have "charilie horses".   Sitting down helps a little bit, and lying down  ( on side, or stomach).   She failed Neurontin 300 mg  for maybe 6 yrs, and takes 3 pills a day, that does not help much.   And , Gabapentin is eventually changed to Lyrica 100 mg that she takes daily (approved by insurance) , and pain in hands is gone. Tolerates Lyrica well.   She has been getting NAYLA since 2011,by Dr. Quinn and Geronimo with NAYLA, with some pain relief.   She had a second B/l L5/S1 TF NAYLA on 08/18/16, and she reported that helped a lot > 80% and lasted for only 2 weeks.   First  B/l L5/S1 TF NAYLA 5/11/16, with  > 50% pain improvement.  On LCV, she was given Hydrocodone , and that in combination with Gabapentin, helped greatly, decreases pain to tolerable 2-3.  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   She is using for gait: SC, manual WC, and RW.  Here for follow up, and treatment.     Past Medical History:   Diagnosis Date    Allergy     Anemia 7/27/2012    Arthritis     CHF (congestive heart failure)     CKD (chronic kidney disease) stage 3, GFR 30-59 ml/min 7/27/2012    Clotting disorder     Deep vein thrombophlebitis of left leg 7/27/2012    Degenerative disc disease     Diabetic peripheral neuropathy associated with type 2 diabetes mellitus 7/27/2012    GERD (gastroesophageal reflux disease)     HTN (hypertension) 7/27/2012    Hyperlipidemia 7/27/2012    Lead-induced gout of ankle or foot     right going on three weeks    Nonproliferative diabetic retinopathy of right eye 7/27/2012    Obstructive sleep apnea 7/27/2012    Osteoporosis     Proliferative " diabetic retinopathy of left eye 2012    Stroke 2003    Type 2 diabetes mellitus with diabetic polyneuropathy 2012    Ulcer        Past Surgical History:   Procedure Laterality Date    CATARACT EXTRACTION W/  INTRAOCULAR LENS IMPLANT Left 3/2002    left     CATARACT EXTRACTION W/  INTRAOCULAR LENS IMPLANT Right     OD dr. olivares     SECTION      COLONOSCOPY N/A 2018    Procedure: COLONOSCOPY;  Surgeon: Faizan Mac MD;  Location: [facility] ([address] [place] [address]);  Service: Endoscopy;  Laterality: N/A;    CYST REMOVAL  2011    left side of face    EYE SURGERY Bilateral 2012    eye implants    GANGLION CYST EXCISION  2007    Right wrist    Pan Retinal Photocoagulation Bilateral     Dr. Monterroso (Proliferative Diabetic Retinopathy)    TOTAL ABDOMINAL HYSTERECTOMY  1982    TOTAL KNEE ARTHROPLASTY  2006    left    TRIGGER FINGER RELEASE  2009       Family History   Problem Relation Age of Onset    Diabetes Mother     Hypertension Mother     Heart disease Father     Diabetes Sister     Thyroid disease Brother     Diabetes Brother     Hypertension Brother     Amblyopia Neg Hx     Blindness Neg Hx     Glaucoma Neg Hx     Macular degeneration Neg Hx     Retinal detachment Neg Hx     Strabismus Neg Hx     Colon cancer Neg Hx     Esophageal cancer Neg Hx        Social History     Social History    Marital status:      Spouse name: Heber    Number of children: 3    Years of education: N/A     Social History Main Topics    Smoking status: Never Smoker    Smokeless tobacco: Never Used    Alcohol use No    Drug use: No    Sexual activity: Yes     Partners: Male     Other Topics Concern    Not on file     Social History Narrative    , lives with family; 3 children, 2 grandchildren       Current Outpatient Prescriptions   Medication Sig Dispense Refill    allopurinol (ZYLOPRIM) 100 MG tablet Take 2 tablets (200 mg total) by mouth  "once daily. 60 tablet 3    amLODIPine (NORVASC) 5 MG tablet Take 1 tablet (5 mg total) by mouth once daily. 90 tablet 1    aspirin 81 MG Chew Take 1 tablet (81 mg total) by mouth once daily.  0    blood-glucose meter kit Use as instructed, true test/result meter 1 each 0    colchicine (COLCRYS) 0.6 mg tablet Take 1 tablet (0.6 mg total) by mouth once daily. 30 tablet 3    DULoxetine (CYMBALTA) 30 MG capsule Take 1 capsule (30 mg total) by mouth once daily. 90 capsule 1    famotidine (PEPCID) 20 MG tablet Take 1 tablet (20 mg total) by mouth 2 (two) times daily. 1 tablet 0    ferrous sulfate 325 mg (65 mg iron) Tab tablet Take 1 tablet (325 mg total) by mouth 2 (two) times daily.  0    fexofenadine (ALLEGRA) 180 MG tablet TAKE 1 TABLET BY MOUTH ONCE DAILY 30 tablet 0    furosemide (LASIX) 20 MG tablet Take 1 tablet (20 mg total) by mouth once daily. 90 tablet 2    HYDROcodone-acetaminophen (NORCO)  mg per tablet Take 1 tablet by mouth every 8 (eight) hours as needed for Pain. 90 tablet 0    [START ON 8/11/2018] HYDROcodone-acetaminophen (NORCO)  mg per tablet Take 1 tablet by mouth every 8 (eight) hours as needed. 90 tablet 0    insulin regular hum U-500 conc (HUMULIN R U-500, CONC, KWIKPEN) 500 unit/mL (3 mL) InPn Inject  Units into the skin 3 (three) times daily before meals. Take 115u with breakfast,85u with lunch, and 50u with dinner. 11 Syringe 4    irbesartan (AVAPRO) 300 MG tablet Take 0.5 tablets (150 mg total) by mouth every evening. 90 tablet 2    labetalol (NORMODYNE) 300 MG tablet Take 1 tablet (300 mg total) by mouth 2 (two) times daily. 180 tablet 2    lancets 31 gauge Misc 1 lancet by Misc.(Non-Drug; Combo Route) route 4 (four) times daily. 150 each 6    liraglutide 0.6 mg/0.1 mL, 18 mg/3 mL, subq PNIJ 0.6 mg/0.1 mL (18 mg/3 mL) PnIj Inject 0.6 mg daily into the skin for 2 weeks then increase to 1.2 mg thereafter daily. 3 mL 11    pen needle, diabetic 32 gauge x 5/32" " Ndle Use with multiple daily insulin injections 100 each 12    predniSONE (DELTASONE) 20 MG tablet Take 1 tablet (20 mg total) by mouth once daily. for 5 days 5 tablet 0    pregabalin (LYRICA) 100 MG capsule TAKE 1 CAPSULE BY MOUTH TWO TIMES DAILY 180 capsule 1    simvastatin (ZOCOR) 40 MG tablet Take 0.5 tablets (20 mg total) by mouth every evening. 90 tablet 1    spironolactone (ALDACTONE) 25 MG tablet Take 0.5 tablets (12.5 mg total) by mouth once daily. 90 tablet 0    TRUETEST TEST STRIPS Strp 1 strip by Misc.(Non-Drug; Combo Route) route 4 (four) times daily. 400 strip 4     No current facility-administered medications for this visit.        Review of patient's allergies indicates:   Allergen Reactions    Iodinated contrast media - oral and iv dye Rash         Review of Systems   Constitutional: Negative.  Negative for appetite change, chills, fatigue, fever and unexpected weight change.   HENT: Negative.  Negative for drooling, trouble swallowing and voice change.    Eyes: Negative.  Negative for pain and visual disturbance.   Respiratory: Negative.  Negative for shortness of breath and wheezing.    Cardiovascular: Negative.  Negative for chest pain and palpitations.   Gastrointestinal: Negative.  Negative for abdominal distention, abdominal pain, constipation and diarrhea.   Genitourinary: Negative.  Negative for difficulty urinating.   Musculoskeletal: Positive for back pain. Negative for arthralgias, gait problem, joint swelling, myalgias and neck stiffness.               Skin: Negative.  Negative for color change and rash.   Neurological: Negative.  Negative for dizziness, facial asymmetry, speech difficulty, weakness, light-headedness and numbness. Headaches:     Hematological: Negative for adenopathy.   Psychiatric/Behavioral: Negative.  Negative for behavioral problems, confusion and sleep disturbance. The patient is not nervous/anxious.            Objective:      Physical Exam    GENERAL: The  patient is alert, oriented, pleasant.   HEENT; PERRLA  NECK: supple   MUSCULOSKELETAL:   Gait: slow james , on wide basis, using RW.   Cervical spine :  Minimally decreased AROM in cervical spine, flexion to 60, extension to  0, side bending and rotation  to 30-35 degrees without pain.  No paravertebral cervical musculature tenderness    No  tenderness in upper trapezius muscles    Lumbar spine, full range of motion in all planes, flexion to 90 degrees , ext. 0.  Side bending and rotation to 35-40 degrees.   Straight leg raising Negative bilaterally.   Full range of motion in all joints x4 extremities, except in RT knee, that is very painful at pattellar lower pole/border.  Appears high riding patella, hat is maybe  lateray displaced,  Muscle strength 5/5 throughout x4 extremities.   No  joint laxity throughout x4 extremities.   NEUROLOGIC: Cranial nerves II through XII intact.   Deep tendon reflexes is normal, +2 in the upper and lower extremities bilaterally.   Muscle tone is normal.   Sensory is intact to light touch and pinprick throughout x4 extremities.     MRI of Lumbar spine showed:  T12-L1:  There is no focal disc herniation.  No significant spinal canal narrowing.    No significant neural foraminal narrowing.  L1-2:  There is no focal disc herniation.  No significant spinal canal narrowing.    No significant neural foraminal narrowing.  L2-3:  There is no focal disc herniation.  No significant spinal canal narrowing.    No significant neural foraminal narrowing.  L3-4:  There is a minimal circumferential disk bulge and mild bilateral facet arthropathy which results in no significant spinal canal or neural foraminal narrowing.  L4-5:    There is circumferential disk bulge (eccentric to the left), moderate bilateral hypertrophic facet arthropathy, and ligamentum flavum thickening which results in mild spinal canal narrowing and no significant neural foraminal narrowing.      L5-S1:  There is  circumferential disk bulge and severe hypertrophic facet arthropathy   with ligamentum flavum hypertrophy which results in mild spinal canal narrowing, moderate left neural foraminal narrowing, and mild right neural foraminal narrowing.i  Impression:   multilevel degenerative changes of the lumbar spine consisting of circumferential disk bulges, hypertrophic facet arthropathy, and ligamentum flavum hypertrophy   which result in mild spinal canal narrowing at L4-5  and L5-S1 as well as moderate left and mild right neural foraminal narrowing at L5-S1.      Assessment:       1. Acute gout of left elbow, unspecified cause    2. Elbow pain, left    3. Chronic low back pain with sciatica, sciatica laterality unspecified, unspecified back pain laterality    4. Lumbar facet arthropathy    5. Primary osteoarthritis of right knee    6. Diabetic autonomic neuropathy associated with type 2 diabetes mellitus      Plan:       Acute gout of left elbow, unspecified cause  -     Uric acid; Future; Expected date: 07/11/2018  -     C-reactive protein; Future; Expected date: 07/11/2018  -     X-Ray Elbow 2 Views Left; Future; Expected date: 07/11/2018  -     Discontinue: colchicine (COLCRYS) 0.6 mg tablet; Take 1 tabl bid, for 3 days, than 1 tabl once a day for 2-3 days, and stop.   Do not take longer than 7 days.   Resume Alopurinol thereafter  Dispense: 30 tablet; Refill: 11  -     HYDROcodone-acetaminophen (NORCO)  mg per tablet; Take 1 tablet by mouth every 8 (eight) hours as needed for Pain.  Dispense: 90 tablet; Refill: 0  -     HYDROcodone-acetaminophen (NORCO)  mg per tablet; Take 1 tablet by mouth every 8 (eight) hours as needed.  Dispense: 90 tablet; Refill: 0  -     Ambulatory Referral to Rheumatology    Elbow pain, left  -     Uric acid; Future; Expected date: 07/11/2018  -     C-reactive protein; Future; Expected date: 07/11/2018  -     X-Ray Elbow 2 Views Left; Future; Expected date: 07/11/2018  -      Discontinue: colchicine (COLCRYS) 0.6 mg tablet; Take 1 tabl bid, for 3 days, than 1 tabl once a day for 2-3 days, and stop.   Do not take longer than 7 days.   Resume Alopurinol thereafter  Dispense: 30 tablet; Refill: 11  -     HYDROcodone-acetaminophen (NORCO)  mg per tablet; Take 1 tablet by mouth every 8 (eight) hours as needed for Pain.  Dispense: 90 tablet; Refill: 0  -     HYDROcodone-acetaminophen (NORCO)  mg per tablet; Take 1 tablet by mouth every 8 (eight) hours as needed.  Dispense: 90 tablet; Refill: 0  -     Ambulatory Referral to Rheumatology    Chronic low back pain with sciatica, sciatica laterality unspecified, unspecified back pain laterality  -     Uric acid; Future; Expected date: 07/11/2018  -     C-reactive protein; Future; Expected date: 07/11/2018  -     X-Ray Elbow 2 Views Left; Future; Expected date: 07/11/2018  -     Discontinue: colchicine (COLCRYS) 0.6 mg tablet; Take 1 tabl bid, for 3 days, than 1 tabl once a day for 2-3 days, and stop.   Do not take longer than 7 days.   Resume Alopurinol thereafter  Dispense: 30 tablet; Refill: 11  -     HYDROcodone-acetaminophen (NORCO)  mg per tablet; Take 1 tablet by mouth every 8 (eight) hours as needed for Pain.  Dispense: 90 tablet; Refill: 0  -     HYDROcodone-acetaminophen (NORCO)  mg per tablet; Take 1 tablet by mouth every 8 (eight) hours as needed.  Dispense: 90 tablet; Refill: 0    Lumbar facet arthropathy  -     Uric acid; Future; Expected date: 07/11/2018  -     C-reactive protein; Future; Expected date: 07/11/2018  -     X-Ray Elbow 2 Views Left; Future; Expected date: 07/11/2018  -     Discontinue: colchicine (COLCRYS) 0.6 mg tablet; Take 1 tabl bid, for 3 days, than 1 tabl once a day for 2-3 days, and stop.   Do not take longer than 7 days.   Resume Alopurinol thereafter  Dispense: 30 tablet; Refill: 11  -     HYDROcodone-acetaminophen (NORCO)  mg per tablet; Take 1 tablet by mouth every 8 (eight) hours  as needed for Pain.  Dispense: 90 tablet; Refill: 0  -     HYDROcodone-acetaminophen (NORCO)  mg per tablet; Take 1 tablet by mouth every 8 (eight) hours as needed.  Dispense: 90 tablet; Refill: 0    Primary osteoarthritis of right knee  -     Uric acid; Future; Expected date: 07/11/2018  -     C-reactive protein; Future; Expected date: 07/11/2018  -     X-Ray Elbow 2 Views Left; Future; Expected date: 07/11/2018  -     Discontinue: colchicine (COLCRYS) 0.6 mg tablet; Take 1 tabl bid, for 3 days, than 1 tabl once a day for 2-3 days, and stop.   Do not take longer than 7 days.   Resume Alopurinol thereafter  Dispense: 30 tablet; Refill: 11  -     HYDROcodone-acetaminophen (NORCO)  mg per tablet; Take 1 tablet by mouth every 8 (eight) hours as needed for Pain.  Dispense: 90 tablet; Refill: 0  -     HYDROcodone-acetaminophen (NORCO)  mg per tablet; Take 1 tablet by mouth every 8 (eight) hours as needed.  Dispense: 90 tablet; Refill: 0    Diabetic autonomic neuropathy associated with type 2 diabetes mellitus  -     Uric acid; Future; Expected date: 07/11/2018  -     C-reactive protein; Future; Expected date: 07/11/2018  -     X-Ray Elbow 2 Views Left; Future; Expected date: 07/11/2018  -     Discontinue: colchicine (COLCRYS) 0.6 mg tablet; Take 1 tabl bid, for 3 days, than 1 tabl once a day for 2-3 days, and stop.   Do not take longer than 7 days.   Resume Alopurinol thereafter  Dispense: 30 tablet; Refill: 11  -     HYDROcodone-acetaminophen (NORCO)  mg per tablet; Take 1 tablet by mouth every 8 (eight) hours as needed for Pain.  Dispense: 90 tablet; Refill: 0  -     HYDROcodone-acetaminophen (NORCO)  mg per tablet; Take 1 tablet by mouth every 8 (eight) hours as needed.  Dispense: 90 tablet; Refill: 0      Patient with chronic low back pain, secondary to lumbar spondylosis, possible radiculopathy, cx with morbid obesity.   Also with gait instability secondary to severe DJD of Rt  knee.  Now, with a new acute Left elbow pain.    1. Acute Left elbow pain,   In dif.dx, Gout, olecranon bursitis, less likely septic joint, since pt has no systemic symptoms.  Has a h/o gout,but has never had in elbow joint,  taking Allopurinol.   Will check for urea, CRP, and order Xray of elbow, to r/o any possible fx.  Start Colchicine 0.6 mg po bid x 3 days, than 1 tbl daily,  ( secondary to CKD).   Still will need Rheumatology consult for fluid drainage,  And evaluation, to r/o gout vs pseudogout.   And for steroid injection.    2. Pain management.  Will resume  Hydrocodone to 10/325 mg , and Gabapentin is changed to  Lyrica, that is helping, will increase to 150 mg BID   ( approved by insurance, and pain in hands is gone),   Added Cymbalta.     3. Rt knee pain , Xray of knee showed severe DJD.  Knee brace for stability,and proprioception.  Seen by [facility] ,and dr. [facility], who recommended no surgery in this high risk pt, weight loss in recommended before re evaluation.  She is s/p steroid Rt knee injection with pain improvement for 4 days, only.   Now trying to loose weight, that she can get TKA.    RTC in 3 months.     Total time spent face to face with patient was 25 minutes.   More than 50% of that time was spent in counseling on diagnosis , prognosis and treatment options.   I also caunsel patient  on common and most usual side effect of prescribed medications.   Risk and benefits of opiates, possible risk of developing opiate dependence and tolerance, need of strict compliance with prescribed medications.  I reviewed Primary care , and other specialty's notes to better coordinate patient's  care.   All questions were answered, and patient voiced understanding.                                                                                                                                                                          .

## 2018-07-12 ENCOUNTER — TELEPHONE (OUTPATIENT)
Dept: PHYSICAL MEDICINE AND REHAB | Facility: CLINIC | Age: 68
End: 2018-07-12

## 2018-07-12 ENCOUNTER — TELEPHONE (OUTPATIENT)
Dept: RHEUMATOLOGY | Facility: CLINIC | Age: 68
End: 2018-07-12

## 2018-07-12 NOTE — TELEPHONE ENCOUNTER
Dr. Barrientos asked that patient be seen in rheumatology for elbow swelling and gout.  Dr. WALLS will see patient tomorrow.  Staff to schedule.

## 2018-07-12 NOTE — TELEPHONE ENCOUNTER
Called patient to inform her about lab results and Xray of  Left elbow.   Also asked Rheumatology to see pt earliest they can.   It seems that she will be seen tomorrow in clinic.   Asked pt also to increase temporary Colchicine form   2 tabs, to 3 tbs for 1-2 days, and go back down to 2 tabs, until her pain improves.   She voiced understanding.

## 2018-07-13 ENCOUNTER — PATIENT OUTREACH (OUTPATIENT)
Dept: DIABETES | Facility: CLINIC | Age: 68
End: 2018-07-13

## 2018-07-13 ENCOUNTER — INITIAL CONSULT (OUTPATIENT)
Dept: RHEUMATOLOGY | Facility: CLINIC | Age: 68
End: 2018-07-13
Payer: MEDICARE

## 2018-07-13 VITALS
HEART RATE: 74 BPM | BODY MASS INDEX: 48.82 KG/M2 | WEIGHT: 293 LBS | DIASTOLIC BLOOD PRESSURE: 65 MMHG | HEIGHT: 65 IN | SYSTOLIC BLOOD PRESSURE: 133 MMHG

## 2018-07-13 DIAGNOSIS — M10.9 ACUTE GOUT OF LEFT ELBOW, UNSPECIFIED CAUSE: ICD-10-CM

## 2018-07-13 DIAGNOSIS — M25.522 ELBOW PAIN, LEFT: ICD-10-CM

## 2018-07-13 DIAGNOSIS — M54.40 CHRONIC LOW BACK PAIN WITH SCIATICA, SCIATICA LATERALITY UNSPECIFIED, UNSPECIFIED BACK PAIN LATERALITY: ICD-10-CM

## 2018-07-13 DIAGNOSIS — M17.11 PRIMARY OSTEOARTHRITIS OF RIGHT KNEE: ICD-10-CM

## 2018-07-13 DIAGNOSIS — M47.816 LUMBAR FACET ARTHROPATHY: ICD-10-CM

## 2018-07-13 DIAGNOSIS — E11.43 DIABETIC AUTONOMIC NEUROPATHY ASSOCIATED WITH TYPE 2 DIABETES MELLITUS: ICD-10-CM

## 2018-07-13 DIAGNOSIS — G89.29 CHRONIC LOW BACK PAIN WITH SCIATICA, SCIATICA LATERALITY UNSPECIFIED, UNSPECIFIED BACK PAIN LATERALITY: ICD-10-CM

## 2018-07-13 DIAGNOSIS — M1A.09X0 IDIOPATHIC CHRONIC GOUT OF MULTIPLE SITES WITHOUT TOPHUS: Primary | ICD-10-CM

## 2018-07-13 PROCEDURE — 99204 OFFICE O/P NEW MOD 45 MIN: CPT | Mod: S$PBB,,, | Performed by: INTERNAL MEDICINE

## 2018-07-13 PROCEDURE — 99213 OFFICE O/P EST LOW 20 MIN: CPT | Mod: PBBFAC | Performed by: INTERNAL MEDICINE

## 2018-07-13 PROCEDURE — 99999 PR PBB SHADOW E&M-EST. PATIENT-LVL III: CPT | Mod: PBBFAC,,, | Performed by: INTERNAL MEDICINE

## 2018-07-13 RX ORDER — COLCHICINE 0.6 MG/1
0.6 TABLET ORAL DAILY
Qty: 30 TABLET | Refills: 3 | Status: SHIPPED | OUTPATIENT
Start: 2018-07-13 | End: 2018-07-13 | Stop reason: SDUPTHER

## 2018-07-13 RX ORDER — PREDNISONE 20 MG/1
20 TABLET ORAL DAILY
Qty: 5 TABLET | Refills: 0 | Status: SHIPPED | OUTPATIENT
Start: 2018-07-13 | End: 2018-07-18

## 2018-07-13 RX ORDER — COLCHICINE 0.6 MG/1
0.6 TABLET ORAL DAILY
Qty: 30 TABLET | Refills: 3 | Status: SHIPPED | OUTPATIENT
Start: 2018-07-13 | End: 2018-07-16 | Stop reason: SDUPTHER

## 2018-07-13 RX ORDER — ALLOPURINOL 100 MG/1
200 TABLET ORAL DAILY
Qty: 60 TABLET | Refills: 3 | Status: SHIPPED | OUTPATIENT
Start: 2018-07-13 | End: 2018-08-12

## 2018-07-13 ASSESSMENT — ROUTINE ASSESSMENT OF PATIENT INDEX DATA (RAPID3)
PAIN SCORE: 8
PATIENT GLOBAL ASSESSMENT SCORE: 8
AM STIFFNESS SCORE: 0, NO
MDHAQ FUNCTION SCORE: .7
FATIGUE SCORE: 8
TOTAL RAPID3 SCORE: 6.11

## 2018-07-13 NOTE — LETTER
July 13, 2018      Micaela Barrientos MD  [address]  [facility] [address]           [facility]  [address]  [place] [address]  Phone: [phone]  Fax: [phone]          Patient: Annika Mac   MR Number: [id number]   YOB: 1950   Date of Visit: 7/13/2018       Dear Dr. Micaela Barrientos:    Thank you for referring Annika Mac to me for evaluation. Attached you will find relevant portions of my assessment and plan of care.    If you have questions, please do not hesitate to call me. I look forward to following Annika Mac along with you.    Sincerely,    Kasi Aleman MD    Enclosure  CC:  No Recipients    If you would like to receive this communication electronically, please contact [email or web address] or [phone] to request more information on [employer] access.    For providers and/or their staff who would like to refer a patient to [facility], please contact us through our one-stop-shop provider referral line, [facility], at [phone].    If you feel you have received this communication in error or would no longer like to receive these types of communications, please e-mail [email or web address]

## 2018-07-13 NOTE — PROGRESS NOTES
Chief Complaint   Patient presents with    Disease Management       Patient was referred by     History of presenting illness    67 year old black female comes with long standing gout since April 2017    Episode : right big toe and ankle  : redness,swelling and pain : couldn't walk,couldnt have a sheet touch her  She had fluid analysis : gout crystals  They gave her colcrys   Symptoms went away    No more episodes until now    Now the left arm started to hurt at the elbow  She couldn't sleep ok  Has swelling left elbow   She has warmth+  She had sea food,gumbo,crabs the last couple of weeks    She gave her colcrys 11th July   She took one tab daily and then TID    Today she has some pain     Left elbow xray with massive effusion    Vianca xrays with spurring  Knee xrays with OA ON THE RIGHT  Left one replaced  LS spine OA  Hand and wrist xrays     Has had uric acids of > 10  2 days ago it was 7.2  .1  JOSE MIGUEL negative  ANCA negative    On allopurinol 100 mg since April 2017    Past history : DM,RUFINA,prior stroke,PAD,LS spine OA,knee OA,htn,hld    Family history : no family h/o gout    Social history : smoked in her high school years  Used to drink,not anymore    Review of Systems   Constitutional: Negative for activity change, appetite change, chills, diaphoresis, fatigue, fever and unexpected weight change.   HENT: Negative for congestion, dental problem, drooling, ear discharge, ear pain, facial swelling, hearing loss, mouth sores, nosebleeds, postnasal drip, rhinorrhea, sinus pain, sinus pressure, sneezing, sore throat, tinnitus, trouble swallowing and voice change.    Eyes: Negative for photophobia, pain, discharge, redness, itching and visual disturbance.   Respiratory: Negative for apnea, cough, choking, chest tightness, shortness of breath, wheezing and stridor.    Cardiovascular: Negative for chest pain, palpitations and leg swelling.   Gastrointestinal: Negative for abdominal distention, abdominal  pain, anal bleeding, blood in stool, constipation, diarrhea, nausea, rectal pain and vomiting.   Endocrine: Negative for cold intolerance, heat intolerance, polydipsia, polyphagia and polyuria.   Genitourinary: Negative for decreased urine volume, difficulty urinating, dysuria, enuresis, flank pain, frequency, genital sores, hematuria and urgency.   Musculoskeletal: Positive for arthralgias. Negative for back pain, gait problem, joint swelling, myalgias, neck pain and neck stiffness.   Skin: Negative for color change, pallor, rash and wound.   Allergic/Immunologic: Negative for environmental allergies, food allergies and immunocompromised state.   Neurological: Negative for dizziness, tremors, seizures, syncope, facial asymmetry, speech difficulty, weakness, light-headedness, numbness and headaches.   Hematological: Negative for adenopathy. Does not bruise/bleed easily.   Psychiatric/Behavioral: Negative for agitation, behavioral problems, confusion, decreased concentration, dysphoric mood, hallucinations, self-injury, sleep disturbance and suicidal ideas. The patient is not nervous/anxious and is not hyperactive.      Physical Exam     Tenderness:   LUE: ulnohumeral and radiohumeral    Swelling:   LUE: ulnohumeral and radiohumeral    LINDSAY-28 tender joint count: 1  LINDSAY-28 swollen joint count: 1    Physical Exam   Constitutional: She is oriented to person, place, and time and well-developed, well-nourished, and in no distress. No distress.   HENT:   Head: Normocephalic.   Mouth/Throat: Oropharynx is clear and moist.   Eyes: Conjunctivae are normal. Pupils are equal, round, and reactive to light. Right eye exhibits no discharge. Left eye exhibits no discharge. No scleral icterus.   Neck: Normal range of motion. No thyromegaly present.   Cardiovascular: Normal rate, regular rhythm, normal heart sounds and intact distal pulses.    Pulmonary/Chest: Effort normal and breath sounds normal. No stridor.   Abdominal: Soft.  Bowel sounds are normal.   Lymphadenopathy:     She has no cervical adenopathy.   Neurological: She is alert and oriented to person, place, and time.   Skin: Skin is warm. No rash noted. She is not diaphoretic.     Psychiatric: Affect and judgment normal.   Musculoskeletal: Normal range of motion.           Assessment     67 year old black female with DM,RUFINA,prior stroke,PAD,LS spine OA,knee OA,htn,hld  She comes in with one episode of big toe and ankle pain and swelling on the right foot a year ago which resolved with colcrys and another episode of acute onset pain and swelling of the left elbow which started 3 days ago  She has partially responded to colcrys and she is here for aspiration and injection of the left elbow and also to discuss long term management  She has had a fluid analysis once in the past which has revealed gout crystals she mentions  This is not available at [facility]  She has had hyperuricemia and she has been on allopurinol 100 mg since April 2017  Uric acids have trended down from 10 + to 7.2  Current uric acid is during an attack and not accurate for us to make decisions on the treatment  Left elbow xray with a huge effusion  We will treat her for acute gouty arthritis of the left elbow      1. Idiopathic chronic gout of multiple sites without tophus    2. Elbow pain, left    3. Chronic low back pain with sciatica, sciatica laterality unspecified, unspecified back pain laterality    4. Lumbar facet arthropathy    5. Primary osteoarthritis of right knee    6. Diabetic autonomic neuropathy associated with type 2 diabetes mellitus    7. Acute gout of left elbow, unspecified cause        Reviewed labs/xrays  Reviewed medications    Ordered labs /xrays    I have independently r/elyse left elbow xray    New problem     Plan    Stat aspiration and injection  I was not successful with the aspiration and due to severe pain,I stopped the procedure   I did go ahead and inject 40 mg depomedrol  Couldn't  obtain fluid    Treatment goals :    Manage the acute attack  Prednisone 20 mg daily for 5 days and stop.Monitor DM,BP at home  Ice application    ULT : allopurinol should never be stopped  Increase allopurinol to 200 mg  If well tolerated stay on the dose,in 4  weeks rpt uric acid  Will follow CBC,CMP periodically  Cautious use of antibiotics like amoxicillin and ampicillin advised  If GI upset will split the dose of allopurinol    Colchicine 0.6 mg daily suggested : for acute attack prophylaxis   Side effects : Gi upset,bone marrow suppression,cardiac toxicity,arrythmias,myotoxicity  No renal insufficiency  No liver disease  Not on cyclosporine,tacrolimus,clarithromycin,erythromycin,verapamil,diltiazem,ketoconazole  Need to assess CBC,CMP discussed  Will stop colchicine 3 months after the target uric acid of less than 6 is achieved,and he has no flares    Dietary modifications discussed : gave a hand out of all foods to avoid : avoid organ meat,red meat,seafood,shell fish,anchovies,sardines,alcohol particularly beer,fructose containing soft drinks  Its ok to consume moderate wine,diet drinks,dairy proteins,coffee  Eat cherries  Eat purine rich vegetables    If in 5 days she doesn't respond,I will have to get her to our US clinic,aspirate the fluid with the help of     Labs in 4 weeks,discuss results on the phone  Follow up in 3 months  Labs every 4 weeks until the goal uric acid is achieved and then every 6 months if asymptomatic        Annika was seen today for disease management.    Diagnoses and all orders for this visit:    Idiopathic chronic gout of multiple sites without tophus    Elbow pain, left  -     Discontinue: colchicine (COLCRYS) 0.6 mg tablet; Take 1 tablet (0.6 mg total) by mouth once daily.  -     colchicine (COLCRYS) 0.6 mg tablet; Take 1 tablet (0.6 mg total) by mouth once daily.    Chronic low back pain with sciatica, sciatica laterality unspecified, unspecified back pain laterality  -      Discontinue: colchicine (COLCRYS) 0.6 mg tablet; Take 1 tablet (0.6 mg total) by mouth once daily.  -     colchicine (COLCRYS) 0.6 mg tablet; Take 1 tablet (0.6 mg total) by mouth once daily.    Lumbar facet arthropathy  -     Discontinue: colchicine (COLCRYS) 0.6 mg tablet; Take 1 tablet (0.6 mg total) by mouth once daily.  -     colchicine (COLCRYS) 0.6 mg tablet; Take 1 tablet (0.6 mg total) by mouth once daily.    Primary osteoarthritis of right knee  -     Discontinue: colchicine (COLCRYS) 0.6 mg tablet; Take 1 tablet (0.6 mg total) by mouth once daily.  -     colchicine (COLCRYS) 0.6 mg tablet; Take 1 tablet (0.6 mg total) by mouth once daily.    Diabetic autonomic neuropathy associated with type 2 diabetes mellitus  -     Discontinue: colchicine (COLCRYS) 0.6 mg tablet; Take 1 tablet (0.6 mg total) by mouth once daily.  -     colchicine (COLCRYS) 0.6 mg tablet; Take 1 tablet (0.6 mg total) by mouth once daily.    Acute gout of left elbow, unspecified cause  -     Discontinue: colchicine (COLCRYS) 0.6 mg tablet; Take 1 tablet (0.6 mg total) by mouth once daily.  -     colchicine (COLCRYS) 0.6 mg tablet; Take 1 tablet (0.6 mg total) by mouth once daily.  -     predniSONE (DELTASONE) 20 MG tablet; Take 1 tablet (20 mg total) by mouth once daily. for 5 days  -     Uric acid; Future  -     Uric acid; Future  -     Uric acid; Future  -     CBC auto differential; Future  -     Comprehensive metabolic panel; Future  -     Sedimentation rate; Future  -     C-reactive protein; Future  -     Culture, Body Fluid - Bactec  -     WBC & Diff,Body Fluid Joint Fluid, Left Elbow  -     Body fluid crystal Joint Fluid, Left Elbow    Other orders  -     allopurinol (ZYLOPRIM) 100 MG tablet; Take 2 tablets (200 mg total) by mouth once daily.      rtc in 3 months  Call me sooner if symptoms dont improve or get worse

## 2018-07-16 ENCOUNTER — TELEPHONE (OUTPATIENT)
Dept: ENDOCRINOLOGY | Facility: CLINIC | Age: 68
End: 2018-07-16

## 2018-07-16 DIAGNOSIS — E11.43 DIABETIC AUTONOMIC NEUROPATHY ASSOCIATED WITH TYPE 2 DIABETES MELLITUS: ICD-10-CM

## 2018-07-16 DIAGNOSIS — M47.816 LUMBAR FACET ARTHROPATHY: ICD-10-CM

## 2018-07-16 DIAGNOSIS — G89.29 CHRONIC LOW BACK PAIN WITH SCIATICA, SCIATICA LATERALITY UNSPECIFIED, UNSPECIFIED BACK PAIN LATERALITY: ICD-10-CM

## 2018-07-16 DIAGNOSIS — M10.9 ACUTE GOUT OF LEFT ELBOW, UNSPECIFIED CAUSE: ICD-10-CM

## 2018-07-16 DIAGNOSIS — M17.11 PRIMARY OSTEOARTHRITIS OF RIGHT KNEE: ICD-10-CM

## 2018-07-16 DIAGNOSIS — M25.522 ELBOW PAIN, LEFT: ICD-10-CM

## 2018-07-16 DIAGNOSIS — M54.40 CHRONIC LOW BACK PAIN WITH SCIATICA, SCIATICA LATERALITY UNSPECIFIED, UNSPECIFIED BACK PAIN LATERALITY: ICD-10-CM

## 2018-07-16 RX ORDER — COLCHICINE 0.6 MG/1
0.6 TABLET ORAL DAILY
Qty: 30 TABLET | Refills: 3 | Status: SHIPPED | OUTPATIENT
Start: 2018-07-16 | End: 2018-07-18

## 2018-07-16 NOTE — TELEPHONE ENCOUNTER
Spoke with pt and advise her to  continue her doses for now due to prednisone. When she stops taking prednisone  Let us know if she has any low BG or elevated >200 consistently.  Pt verbally understand.

## 2018-07-16 NOTE — TELEPHONE ENCOUNTER
----- Message from Ursula Salinas NP sent at 7/16/2018  7:38 AM CDT -----  Call and ask patient how she her blood sugars are doing over the weekend since starting prednisone?     Thank you,   Ursula     ----- Message -----  From: Grecia Miranda RD, CDE  Sent: 7/13/2018   3:51 PM  To: Ursula Salinas NP    See attached log for recent BG.     Last few days running low in morning. Called and spoke with her to see if anything changed and she said that this week she hadn't been feeling well.  Seen today in rheumatology for gout and was rx prednisone.     Please call patient and let her know how/if you'd like to adjust her doses.      ----- Message -----  From: Kimberly Madera MA  Sent: 7/13/2018   3:35 PM  To: , #

## 2018-07-16 NOTE — TELEPHONE ENCOUNTER
Spoke with pt and pt stated that she start her prednisone on last Friday and will be off this Wednesday.    Pt sugar on 13th in the mor- 165, 212 in afternoon, 174 umang and 148 at night.  14th in the mor was 350, lunch 258, umang 238, and bedtime it was 140.  Yesterday in the morning it was 494, 421 at lunch, 294 in umang. This morning it was 229. Pt stated that she is taking U-500 140 unit in the morning , 100 unit at lunch and 65 unit at bed time with Victoza 1.2 daily.

## 2018-07-18 ENCOUNTER — OFFICE VISIT (OUTPATIENT)
Dept: GASTROENTEROLOGY | Facility: CLINIC | Age: 68
End: 2018-07-18
Payer: MEDICARE

## 2018-07-18 ENCOUNTER — LAB VISIT (OUTPATIENT)
Dept: LAB | Facility: HOSPITAL | Age: 68
End: 2018-07-18
Payer: MEDICARE

## 2018-07-18 VITALS
SYSTOLIC BLOOD PRESSURE: 104 MMHG | DIASTOLIC BLOOD PRESSURE: 60 MMHG | WEIGHT: 293 LBS | HEART RATE: 76 BPM | BODY MASS INDEX: 48.82 KG/M2 | HEIGHT: 65 IN

## 2018-07-18 DIAGNOSIS — D50.9 IRON DEFICIENCY ANEMIA, UNSPECIFIED IRON DEFICIENCY ANEMIA TYPE: Primary | ICD-10-CM

## 2018-07-18 DIAGNOSIS — D50.9 IRON DEFICIENCY ANEMIA, UNSPECIFIED IRON DEFICIENCY ANEMIA TYPE: ICD-10-CM

## 2018-07-18 LAB
BASOPHILS # BLD AUTO: 0.03 K/UL
BASOPHILS NFR BLD: 0.3 %
DIFFERENTIAL METHOD: ABNORMAL
EOSINOPHIL # BLD AUTO: 0.1 K/UL
EOSINOPHIL NFR BLD: 1.2 %
ERYTHROCYTE [DISTWIDTH] IN BLOOD BY AUTOMATED COUNT: 13.6 %
FERRITIN SERPL-MCNC: 215 NG/ML
HCT VFR BLD AUTO: 32.7 %
HGB BLD-MCNC: 10.5 G/DL
IMM GRANULOCYTES # BLD AUTO: 0.05 K/UL
IMM GRANULOCYTES NFR BLD AUTO: 0.5 %
IRON SERPL-MCNC: 55 UG/DL
LYMPHOCYTES # BLD AUTO: 2.2 K/UL
LYMPHOCYTES NFR BLD: 23.1 %
MCH RBC QN AUTO: 28.2 PG
MCHC RBC AUTO-ENTMCNC: 32.1 G/DL
MCV RBC AUTO: 88 FL
MONOCYTES # BLD AUTO: 0.8 K/UL
MONOCYTES NFR BLD: 8.4 %
NEUTROPHILS # BLD AUTO: 6.3 K/UL
NEUTROPHILS NFR BLD: 66.5 %
NRBC BLD-RTO: 0 /100 WBC
PLATELET # BLD AUTO: 331 K/UL
PMV BLD AUTO: 10.2 FL
RBC # BLD AUTO: 3.72 M/UL
SATURATED IRON: 16 %
TOTAL IRON BINDING CAPACITY: 334 UG/DL
TRANSFERRIN SERPL-MCNC: 226 MG/DL
WBC # BLD AUTO: 9.43 K/UL

## 2018-07-18 PROCEDURE — 99999 PR PBB SHADOW E&M-EST. PATIENT-LVL V: CPT | Mod: PBBFAC,,, | Performed by: PHYSICIAN ASSISTANT

## 2018-07-18 PROCEDURE — 82728 ASSAY OF FERRITIN: CPT

## 2018-07-18 PROCEDURE — 99215 OFFICE O/P EST HI 40 MIN: CPT | Mod: PBBFAC | Performed by: PHYSICIAN ASSISTANT

## 2018-07-18 PROCEDURE — 85025 COMPLETE CBC W/AUTO DIFF WBC: CPT

## 2018-07-18 PROCEDURE — 99214 OFFICE O/P EST MOD 30 MIN: CPT | Mod: S$PBB,,, | Performed by: PHYSICIAN ASSISTANT

## 2018-07-18 PROCEDURE — 36415 COLL VENOUS BLD VENIPUNCTURE: CPT

## 2018-07-18 PROCEDURE — 83540 ASSAY OF IRON: CPT

## 2018-07-18 NOTE — PROGRESS NOTES
[facility] Consultation Note    Reason for Consult:  The encounter diagnosis was Iron deficiency anemia, unspecified iron deficiency anemia type.    PCP:   Lexy Cotton       Referring MD:  No referring provider defined for this encounter.    HPI:  This is a 67 y.o. female here to follow up on her MARION  Last seen by Dr Lopez 6 months ago.  2/2018 EGD- gastritis, HP neg  2/2018 colonoscopy - polyp x 1 ,TA             Today she denies melena or rectal bleeding  Still taking iron pills twice daily  Also denies abdominal pain, N/V  Took predisone and colchicine caused some diarrhea which has since resolved    ROS:  Constitutional: No fevers, chills, No weight loss  ENT: No allergies  CV: No chest pain  Pulm: No cough, No shortness of breath  Ophtho: No vision changes  GI: see HPI  Derm: No rash  Heme: No lymphadenopathy, No bruising  MSK: No arthritis  : No dysuria, No hematuria  Endo: No hot or cold intolerance  Neuro: No syncope, No seizure  Psych: No anxiety, No depression    Medical History:  has a past medical history of Allergy; Anemia (7/27/2012); Arthritis; CHF (congestive heart failure); CKD (chronic kidney disease) stage 3, GFR 30-59 ml/min (7/27/2012); Clotting disorder; Colon polyp; Deep vein thrombophlebitis of left leg (7/27/2012); Degenerative disc disease; Diabetic peripheral neuropathy associated with type 2 diabetes mellitus (7/27/2012); GERD (gastroesophageal reflux disease); HTN (hypertension) (7/27/2012); Hyperlipidemia (7/27/2012); Lead-induced gout of ankle or foot; Nonproliferative diabetic retinopathy of right eye (7/27/2012); Obstructive sleep apnea (7/27/2012); Osteoporosis; Proliferative diabetic retinopathy of left eye (7/27/2012); Stroke (8/1/2003); Type 2 diabetes mellitus with diabetic polyneuropathy (7/27/2012); and Ulcer.    Surgical History:  has a past surgical history that includes Total knee arthroplasty (2/2006); Total abdominal hysterectomy  (); Ganglion cyst excision (2007); Cyst Removal (2011); Trigger finger release (2009);  section; Cataract extraction w/  intraocular lens implant (Left, 3/2002); Cataract extraction w/  intraocular lens implant (Right, ); Pan Retinal Photocoagulation (Bilateral); Eye surgery (Bilateral, ); and Colonoscopy (N/A, 2018).    Family History: family history includes Diabetes in her brother, mother, and sister; Heart disease in her father; Hypertension in her brother and mother; Thyroid disease in her brother..     Social History:  reports that she has never smoked. She has never used smokeless tobacco. She reports that she does not drink alcohol or use drugs.    Review of patient's allergies indicates:   Allergen Reactions    Iodinated contrast- oral and iv dye Rash       Current Outpatient Prescriptions on File Prior to Visit   Medication Sig Dispense Refill    allopurinol (ZYLOPRIM) 100 MG tablet Take 2 tablets (200 mg total) by mouth once daily. 60 tablet 3    amLODIPine (NORVASC) 5 MG tablet Take 1 tablet (5 mg total) by mouth once daily. 90 tablet 1    aspirin 81 MG Chew Take 1 tablet (81 mg total) by mouth once daily.  0    blood-glucose meter kit Use as instructed, true test/result meter 1 each 0    DULoxetine (CYMBALTA) 30 MG capsule Take 1 capsule (30 mg total) by mouth once daily. 90 capsule 1    famotidine (PEPCID) 20 MG tablet Take 1 tablet (20 mg total) by mouth 2 (two) times daily. 1 tablet 0    ferrous sulfate 325 mg (65 mg iron) Tab tablet Take 1 tablet (325 mg total) by mouth 2 (two) times daily.  0    fexofenadine (ALLEGRA) 180 MG tablet TAKE 1 TABLET BY MOUTH ONCE DAILY 30 tablet 0    furosemide (LASIX) 20 MG tablet Take 1 tablet (20 mg total) by mouth once daily. 90 tablet 2    HYDROcodone-acetaminophen (NORCO)  mg per tablet Take 1 tablet by mouth every 8 (eight) hours as needed for Pain. 90 tablet 0    [START ON 2018]  "HYDROcodone-acetaminophen (NORCO)  mg per tablet Take 1 tablet by mouth every 8 (eight) hours as needed. 90 tablet 0    insulin regular hum U-500 conc (HUMULIN R U-500, CONC, KWIKPEN) 500 unit/mL (3 mL) InPn Inject  Units into the skin 3 (three) times daily before meals. Take 115u with breakfast,85u with lunch, and 50u with dinner. 11 Syringe 4    irbesartan (AVAPRO) 300 MG tablet Take 0.5 tablets (150 mg total) by mouth every evening. 90 tablet 2    labetalol (NORMODYNE) 300 MG tablet Take 1 tablet (300 mg total) by mouth 2 (two) times daily. 180 tablet 2    lancets 31 gauge Misc 1 lancet by Misc.(Non-Drug; Combo Route) route 4 (four) times daily. 150 each 6    liraglutide 0.6 mg/0.1 mL, 18 mg/3 mL, subq PNIJ 0.6 mg/0.1 mL (18 mg/3 mL) PnIj Inject 0.6 mg daily into the skin for 2 weeks then increase to 1.2 mg thereafter daily. 3 mL 11    pen needle, diabetic 32 gauge x 5/32" Ndle Use with multiple daily insulin injections 100 each 12    pregabalin (LYRICA) 100 MG capsule TAKE 1 CAPSULE BY MOUTH TWO TIMES DAILY 180 capsule 1    simvastatin (ZOCOR) 40 MG tablet Take 0.5 tablets (20 mg total) by mouth every evening. 90 tablet 1    spironolactone (ALDACTONE) 25 MG tablet Take 0.5 tablets (12.5 mg total) by mouth once daily. 90 tablet 0    TRUETEST TEST STRIPS Strp 1 strip by Misc.(Non-Drug; Combo Route) route 4 (four) times daily. 400 strip 4    [DISCONTINUED] colchicine (COLCRYS) 0.6 mg tablet Take 1 tablet (0.6 mg total) by mouth once daily. 30 tablet 3    [DISCONTINUED] predniSONE (DELTASONE) 20 MG tablet Take 1 tablet (20 mg total) by mouth once daily. for 5 days 5 tablet 0     No current facility-administered medications on file prior to visit.          Objective Findings:    Vital Signs:  /60   Pulse 76   Ht 5' 5" (1.651 m)   Wt (!) 137 kg (302 lb)   BMI 50.26 kg/m²   Body mass index is 50.26 kg/m².    Physical Exam:  General Appearance: Well appearing in no acute distress  Head: "   Normocephalic, without obvious abnormality  Eyes:    No scleral icterus  ENT: Neck supple, Lips, mucosa, and tongue normal  Lungs: CTA bilaterally in anterior and posterior fields, no wheezes, no crackles.  Heart:  Regular rate and rhythm, S1, S2 normal, no murmurs heard  Abdomen: Soft, non tender, non distended with positive bowel sounds in all four quadrants.   Extremities:no edema  Skin: No rash  Neurologic: AAO x 3      Labs:  Lab Results   Component Value Date    WBC 9.43 07/18/2018    HGB 10.5 (L) 07/18/2018    HCT 32.7 (L) 07/18/2018     07/18/2018    CHOL 144 04/23/2018    TRIG 190 (H) 04/23/2018    HDL 34 (L) 04/23/2018    ALT 14 11/16/2017    AST 18 11/16/2017     03/07/2018    K 4.7 03/07/2018     03/07/2018    CREATININE 1.7 (H) 03/07/2018    BUN 34 (H) 03/07/2018    CO2 26 03/07/2018    TSH 0.984 03/13/2018    INR 1.0 03/10/2006    HGBA1C 9.6 (H) 06/08/2018       Imaging:    Endoscopy:    2/2018 EGD- gastritis, HP neg  2/2018 colonoscopy - polyp x 1 ,TA             Assessment:  1. Iron deficiency anemia, unspecified iron deficiency anemia type       68yo F with anemia of chronic renal failure presents with MARION. EGD/colonoscopy was unrevealing    Recommendations:  1. If SBFT is normal will proceed with VCE with golytely prep. I discussed with the patient the risks and benefits of the VCE that include the retention of pill and possible surgical retrieval. I also gave her the option of not proceeding with the VCE given the risk of potentially missing a tumor, ordering a CTE,and monitoring the H/H. He agreed to proceed with the VCE. VCE consent was signed in office today by my self and the patient and witnessed by medical assistant Pratima Guzman.      2. Labs to evaluate anemia and iron levels    No Follow-up on file.      Order summary:  Orders Placed This Encounter    FL Small Bowel Follow Through    CBC auto differential    Iron and TIBC    Ferritin    Capsule Video Endoscopy          Thank you so much for allowing me to participate in the care of Annika Bills PA-C

## 2018-07-24 ENCOUNTER — PES CALL (OUTPATIENT)
Dept: ADMINISTRATIVE | Facility: CLINIC | Age: 68
End: 2018-07-24

## 2018-07-25 DIAGNOSIS — E11.43 DIABETIC AUTONOMIC NEUROPATHY ASSOCIATED WITH TYPE 2 DIABETES MELLITUS: ICD-10-CM

## 2018-07-25 DIAGNOSIS — M48.061 SPINAL STENOSIS OF LUMBAR REGION, UNSPECIFIED WHETHER NEUROGENIC CLAUDICATION PRESENT: ICD-10-CM

## 2018-07-25 DIAGNOSIS — M54.40 CHRONIC LOW BACK PAIN WITH SCIATICA, SCIATICA LATERALITY UNSPECIFIED, UNSPECIFIED BACK PAIN LATERALITY: ICD-10-CM

## 2018-07-25 DIAGNOSIS — G89.29 CHRONIC LOW BACK PAIN WITH SCIATICA, SCIATICA LATERALITY UNSPECIFIED, UNSPECIFIED BACK PAIN LATERALITY: ICD-10-CM

## 2018-07-25 DIAGNOSIS — M47.816 LUMBAR FACET ARTHROPATHY: ICD-10-CM

## 2018-07-25 DIAGNOSIS — M17.11 PRIMARY OSTEOARTHRITIS OF RIGHT KNEE: ICD-10-CM

## 2018-07-25 DIAGNOSIS — H26.493 PCO (POSTERIOR CAPSULAR OPACIFICATION), BILATERAL: ICD-10-CM

## 2018-07-25 DIAGNOSIS — M25.561 ACUTE PAIN OF RIGHT KNEE: ICD-10-CM

## 2018-07-25 RX ORDER — DULOXETIN HYDROCHLORIDE 30 MG/1
30 CAPSULE, DELAYED RELEASE ORAL DAILY
Qty: 90 CAPSULE | Refills: 1 | Status: SHIPPED | OUTPATIENT
Start: 2018-07-25 | End: 2018-10-11 | Stop reason: SDUPTHER

## 2018-08-06 ENCOUNTER — TELEPHONE (OUTPATIENT)
Dept: RADIOLOGY | Facility: HOSPITAL | Age: 68
End: 2018-08-06

## 2018-08-06 ENCOUNTER — TELEPHONE (OUTPATIENT)
Dept: PHYSICAL MEDICINE AND REHAB | Facility: CLINIC | Age: 68
End: 2018-08-06

## 2018-08-06 RX ORDER — PEN NEEDLE, DIABETIC 30 GX3/16"
NEEDLE, DISPOSABLE MISCELLANEOUS
Qty: 400 EACH | Refills: 12 | Status: SHIPPED | OUTPATIENT
Start: 2018-08-06 | End: 2018-09-19

## 2018-08-06 NOTE — TELEPHONE ENCOUNTER
Spoke to the patient to let her know that [facility] has orders for both Cymbalta and Lyrica she just needed to call them to complete her order.

## 2018-08-06 NOTE — TELEPHONE ENCOUNTER
----- Message from Jessi Paulino sent at 8/6/2018 12:00 PM CDT -----  Contact: Self [phone]  ..Refill request.   pen needle, diabetic 32 gauge      ..  [facility] [address] [address]  [address] [address] [address]  Phone: [phone] Fax: [phone]

## 2018-08-06 NOTE — TELEPHONE ENCOUNTER
----- Message from William Lobo sent at 8/6/2018 11:55 AM CDT -----  Rx Refill/Request     Is this a Refill or New Rx: Refill   Rx Name and Strength:  DULoxetine (CYMBALTA) 30 MG capsule and pregabalin (LYRICA) 100 MG capsule  Preferred Pharmacy with phone number: See below  Communication Preference: [phone]  Additional Information: pt states she needs the medication in less than 3 days

## 2018-08-07 ENCOUNTER — HOSPITAL ENCOUNTER (OUTPATIENT)
Dept: RADIOLOGY | Facility: HOSPITAL | Age: 68
Discharge: HOME | End: 2018-08-07
Attending: PHYSICIAN ASSISTANT
Payer: MEDICARE

## 2018-08-07 ENCOUNTER — TELEPHONE (OUTPATIENT)
Dept: NEPHROLOGY | Facility: CLINIC | Age: 68
End: 2018-08-07

## 2018-08-07 DIAGNOSIS — D50.9 IRON DEFICIENCY ANEMIA, UNSPECIFIED IRON DEFICIENCY ANEMIA TYPE: ICD-10-CM

## 2018-08-07 PROCEDURE — 74250 X-RAY XM SM INT 1CNTRST STD: CPT | Mod: TC,FY

## 2018-08-07 PROCEDURE — 74250 X-RAY XM SM INT 1CNTRST STD: CPT | Mod: 26,,, | Performed by: RADIOLOGY

## 2018-08-09 LAB — POCT GLUCOSE: 255 MG/DL (ref 70–110)

## 2018-08-13 ENCOUNTER — LAB VISIT (OUTPATIENT)
Dept: LAB | Facility: HOSPITAL | Age: 68
End: 2018-08-13
Attending: INTERNAL MEDICINE
Payer: MEDICARE

## 2018-08-13 DIAGNOSIS — M10.9 ACUTE GOUT OF LEFT ELBOW, UNSPECIFIED CAUSE: ICD-10-CM

## 2018-08-13 LAB — URATE SERPL-MCNC: 6.2 MG/DL

## 2018-08-13 PROCEDURE — 36415 COLL VENOUS BLD VENIPUNCTURE: CPT | Mod: PO

## 2018-08-13 PROCEDURE — 84550 ASSAY OF BLOOD/URIC ACID: CPT

## 2018-08-15 ENCOUNTER — TELEPHONE (OUTPATIENT)
Dept: GASTROENTEROLOGY | Facility: CLINIC | Age: 68
End: 2018-08-15

## 2018-08-16 ENCOUNTER — TELEPHONE (OUTPATIENT)
Dept: GASTROENTEROLOGY | Facility: CLINIC | Age: 68
End: 2018-08-16

## 2018-08-16 ENCOUNTER — TELEPHONE (OUTPATIENT)
Dept: ENDOCRINOLOGY | Facility: CLINIC | Age: 68
End: 2018-08-16

## 2018-08-16 NOTE — TELEPHONE ENCOUNTER
Spoke with patient in regards to results.  Instructions for VCE explained to patient, also written instructions have been mailed to patient.  Diabetic instructions given to patient and mailed.    She verbalizes understanding.    Please send VCE Prep to [facility] [place] - [address] [address]

## 2018-08-16 NOTE — TELEPHONE ENCOUNTER
----- Message from Jessi Paulino sent at 8/16/2018 11:21 AM CDT -----  Contact: Self [phone]  PT needs a clearance for a dental surgery that she has to have done.    [facility] Dentist Megan Manrique [phone] (phone)

## 2018-08-17 NOTE — TELEPHONE ENCOUNTER
Spoke with a phone rep regards to Ms. Roblero clearance letter. Ursula stated that she cannot not clear patient due to her A1C being 9%

## 2018-08-21 ENCOUNTER — TELEPHONE (OUTPATIENT)
Dept: ENDOCRINOLOGY | Facility: CLINIC | Age: 68
End: 2018-08-21

## 2018-08-21 ENCOUNTER — OFFICE VISIT (OUTPATIENT)
Dept: CARDIOLOGY | Facility: CLINIC | Age: 68
End: 2018-08-21
Payer: MEDICARE

## 2018-08-21 ENCOUNTER — LAB VISIT (OUTPATIENT)
Dept: LAB | Facility: HOSPITAL | Age: 68
End: 2018-08-21
Payer: MEDICARE

## 2018-08-21 VITALS
HEIGHT: 65 IN | HEART RATE: 79 BPM | DIASTOLIC BLOOD PRESSURE: 58 MMHG | BODY MASS INDEX: 50.26 KG/M2 | SYSTOLIC BLOOD PRESSURE: 125 MMHG

## 2018-08-21 DIAGNOSIS — I10 ESSENTIAL HYPERTENSION: ICD-10-CM

## 2018-08-21 DIAGNOSIS — Z01.810 PRE-OPERATIVE CARDIOVASCULAR EXAMINATION: ICD-10-CM

## 2018-08-21 DIAGNOSIS — E78.2 MIXED HYPERLIPIDEMIA: ICD-10-CM

## 2018-08-21 DIAGNOSIS — I51.89 DIASTOLIC DYSFUNCTION: Chronic | ICD-10-CM

## 2018-08-21 DIAGNOSIS — N18.30 CKD (CHRONIC KIDNEY DISEASE) STAGE 3, GFR 30-59 ML/MIN: Chronic | ICD-10-CM

## 2018-08-21 LAB
ANION GAP SERPL CALC-SCNC: 7 MMOL/L
BUN SERPL-MCNC: 28 MG/DL
CALCIUM SERPL-MCNC: 9.8 MG/DL
CHLORIDE SERPL-SCNC: 104 MMOL/L
CO2 SERPL-SCNC: 26 MMOL/L
CREAT SERPL-MCNC: 1.7 MG/DL
EST. GFR  (AFRICAN AMERICAN): 35.2 ML/MIN/1.73 M^2
EST. GFR  (NON AFRICAN AMERICAN): 30.6 ML/MIN/1.73 M^2
GLUCOSE SERPL-MCNC: 305 MG/DL
POTASSIUM SERPL-SCNC: 4.7 MMOL/L
SODIUM SERPL-SCNC: 137 MMOL/L

## 2018-08-21 PROCEDURE — 36415 COLL VENOUS BLD VENIPUNCTURE: CPT

## 2018-08-21 PROCEDURE — 99213 OFFICE O/P EST LOW 20 MIN: CPT | Mod: PBBFAC | Performed by: STUDENT IN AN ORGANIZED HEALTH CARE EDUCATION/TRAINING PROGRAM

## 2018-08-21 PROCEDURE — 99214 OFFICE O/P EST MOD 30 MIN: CPT | Mod: S$PBB,GC,, | Performed by: INTERNAL MEDICINE

## 2018-08-21 PROCEDURE — 99999 PR PBB SHADOW E&M-EST. PATIENT-LVL III: CPT | Mod: PBBFAC,GC,, | Performed by: STUDENT IN AN ORGANIZED HEALTH CARE EDUCATION/TRAINING PROGRAM

## 2018-08-21 PROCEDURE — 80048 BASIC METABOLIC PNL TOTAL CA: CPT

## 2018-08-21 RX ORDER — LABETALOL 300 MG/1
300 TABLET, FILM COATED ORAL 2 TIMES DAILY
Qty: 180 TABLET | Refills: 3 | Status: SHIPPED | OUTPATIENT
Start: 2018-08-21 | End: 2019-09-17 | Stop reason: SDUPTHER

## 2018-08-21 RX ORDER — IRBESARTAN 300 MG/1
150 TABLET ORAL NIGHTLY
Qty: 90 TABLET | Refills: 3 | Status: SHIPPED | OUTPATIENT
Start: 2018-08-21 | End: 2019-04-09 | Stop reason: RX

## 2018-08-21 RX ORDER — FUROSEMIDE 20 MG/1
20 TABLET ORAL DAILY
Qty: 120 TABLET | Refills: 3 | Status: SHIPPED | OUTPATIENT
Start: 2018-08-21 | End: 2019-08-27

## 2018-08-21 RX ORDER — SPIRONOLACTONE 25 MG/1
12.5 TABLET ORAL DAILY
Qty: 90 TABLET | Refills: 2 | Status: SHIPPED | OUTPATIENT
Start: 2018-08-21 | End: 2019-04-02 | Stop reason: ALTCHOICE

## 2018-08-21 NOTE — PROGRESS NOTES
"Subjective:    Patient ID:  Annika Mac is a 67 y.o. female who presents for follow-up of Essential hypertension and PAD (peripheral artery disease)      HPI  68 yo F with PMH CKD 2, HTN, DM2, CVA, OA presenting for follow up of her Hypertension. She denies any MCFADDEN, Chest pain or new symptoms except for mild leg swelling after standing too long. She takes her medications as prescribed. She tries to eat a healthy diet but notes it is "hard" and has active weight issues and diabetes.    Review of Systems   Constitution: Negative for chills, decreased appetite and diaphoresis.   HENT: Negative for congestion and ear discharge.    Eyes: Negative for blurred vision and discharge.   Cardiovascular: Positive for leg swelling. Negative for chest pain, dyspnea on exertion, irregular heartbeat and paroxysmal nocturnal dyspnea.   Respiratory: Negative for cough, hemoptysis and shortness of breath.    Musculoskeletal: Positive for arthritis, back pain and joint pain.   Gastrointestinal: Negative for abdominal pain.        Objective:    Physical Exam   Constitutional: She is oriented to person, place, and time. She appears well-developed and well-nourished. No distress.   Eyes: Conjunctivae are normal. Pupils are equal, round, and reactive to light.   Neck: No tracheal deviation present. No thyromegaly present.   Cardiovascular: Normal rate, regular rhythm and intact distal pulses. Exam reveals no gallop and no friction rub.   Murmur heard.  Faint 2/6 mid systolic murmur   Pulmonary/Chest: Effort normal and breath sounds normal. No respiratory distress. She has no wheezes. She has no rales.   Abdominal: Soft. Bowel sounds are normal. She exhibits no distension. There is no tenderness.   Musculoskeletal: She exhibits edema. She exhibits no deformity.   Neurological: She is alert and oriented to person, place, and time. No cranial nerve deficit. Coordination normal.   Skin: Skin is warm and dry. She is not diaphoretic. "   Psychiatric: She has a normal mood and affect. Her behavior is normal.         Assessment:       1. Diastolic dysfunction    2. Essential hypertension    3. CKD (chronic kidney disease) stage 3, GFR 30-59 ml/min    4. Mixed hyperlipidemia         Plan:   Diastolic dysfunction  -Last TTE 2015 HFpEF 60% with DD  -1+ LE edema on exam which she says is exacerbated by standing  -Otherwise Euvolemic, no JVD or MCFADDEN  - Encourage to take extra PRN dose for LE swelling of lasix (20mg)  -Limit salty intake and eat a better diet    Essential hypertension  - Blood pressure well controlled 125/58 HR79  - Continue current regimen and will refill medications  - Check BMP today last creatinine at baseline 1.7, K 4.7     Pre-operative cardiovascular examination  - Patient will have minimally invasive dental procedure done with topical anasthesia  - From a cardiac perspective ok to hold Aspirin for 5 days pre-procedure and restart 2 days post-procedure or when hemostasis is achieved per the dentist.  -She has multiple co morbidities (HTN, DM, old CVA) - RCRI 3 but this is a low risk procedure and may proceed.

## 2018-08-21 NOTE — PATIENT INSTRUCTIONS
- Encourage to take extra PRN dose for LE swelling of lasix (20mg)  -Limit salty intake and eat a better diet

## 2018-08-21 NOTE — ASSESSMENT & PLAN NOTE
- Blood pressure well controlled 125/58 HR79  - Continue current regimen and will refill medications  - Check BMP today last creatinine at baseline 1.7, K 4.7

## 2018-08-21 NOTE — ASSESSMENT & PLAN NOTE
-Last TTE 2015 HFpEF 60% with DD  -1+ LE edema on exam which she says is exacerbated by standing  -Otherwise Euvolemic, no JVD or MCFADDEN  - Encourage to take extra PRN dose for LE swelling of lasix (20mg)  -Limit salty intake and eat a better diet

## 2018-08-21 NOTE — TELEPHONE ENCOUNTER
Spoke with pt and notified that we dont do clearing for surgery. Pt needs to contact her PCP. Pt verbally understand.

## 2018-08-21 NOTE — ASSESSMENT & PLAN NOTE
- Patient will have minimally invasive dental procedure done with topical anasthesia  - From a cardiac perspective ok to hold Aspirin for 5 days pre-procedure and restart 2 days post-procedure or when hemostasis is achieved per the dentist.  -She has multiple co morbidities (HTN, DM, old CVA) - RCRI 3 but this is a low risk procedure and may proceed.

## 2018-08-21 NOTE — TELEPHONE ENCOUNTER
----- Message from Jessi Paulino sent at 8/21/2018  9:50 AM CDT -----  Contact: Self [phone]  Pls contact with pt with an update about clearance for dental surgery.

## 2018-08-22 ENCOUNTER — TELEPHONE (OUTPATIENT)
Dept: CARDIOLOGY | Facility: CLINIC | Age: 68
End: 2018-08-22

## 2018-08-22 NOTE — PROGRESS NOTES
Please let patient know that shows that mildly depressed kidney function is unchanged and that glucose is high.

## 2018-08-22 NOTE — TELEPHONE ENCOUNTER
----- Message from Noreen Lundy sent at 8/22/2018 11:02 AM CDT -----  Contact: Pt called   Pt returning call. Please call pt @ [phone]. Thank you.

## 2018-08-23 ENCOUNTER — TELEPHONE (OUTPATIENT)
Dept: GASTROENTEROLOGY | Facility: CLINIC | Age: 68
End: 2018-08-23

## 2018-08-23 NOTE — TELEPHONE ENCOUNTER
----- Message from Melissa Harvey sent at 8/23/2018  1:50 PM CDT -----  Contact: self - [phone]  Sanju - taking vce - has questions re meds - please call patient at

## 2018-08-24 ENCOUNTER — OFFICE VISIT (OUTPATIENT)
Dept: PODIATRY | Facility: CLINIC | Age: 68
End: 2018-08-24
Payer: MEDICARE

## 2018-08-24 ENCOUNTER — OFFICE VISIT (OUTPATIENT)
Dept: INTERNAL MEDICINE | Facility: CLINIC | Age: 68
End: 2018-08-24
Payer: MEDICARE

## 2018-08-24 ENCOUNTER — OFFICE VISIT (OUTPATIENT)
Dept: ORTHOPEDICS | Facility: CLINIC | Age: 68
End: 2018-08-24
Payer: MEDICARE

## 2018-08-24 ENCOUNTER — CLINICAL SUPPORT (OUTPATIENT)
Dept: GASTROENTEROLOGY | Facility: CLINIC | Age: 68
End: 2018-08-24
Payer: MEDICARE

## 2018-08-24 ENCOUNTER — TELEPHONE (OUTPATIENT)
Dept: GASTROENTEROLOGY | Facility: CLINIC | Age: 68
End: 2018-08-24

## 2018-08-24 ENCOUNTER — HOSPITAL ENCOUNTER (OUTPATIENT)
Dept: RADIOLOGY | Facility: HOSPITAL | Age: 68
Discharge: HOME | End: 2018-08-24
Attending: PHYSICIAN ASSISTANT
Payer: MEDICARE

## 2018-08-24 VITALS
HEART RATE: 78 BPM | SYSTOLIC BLOOD PRESSURE: 148 MMHG | DIASTOLIC BLOOD PRESSURE: 71 MMHG | HEIGHT: 65 IN | BODY MASS INDEX: 50.26 KG/M2

## 2018-08-24 VITALS
HEART RATE: 87 BPM | HEIGHT: 65 IN | WEIGHT: 293 LBS | OXYGEN SATURATION: 98 % | SYSTOLIC BLOOD PRESSURE: 130 MMHG | BODY MASS INDEX: 48.82 KG/M2 | DIASTOLIC BLOOD PRESSURE: 60 MMHG

## 2018-08-24 VITALS
WEIGHT: 293 LBS | HEART RATE: 82 BPM | SYSTOLIC BLOOD PRESSURE: 142 MMHG | BODY MASS INDEX: 48.82 KG/M2 | DIASTOLIC BLOOD PRESSURE: 70 MMHG | HEIGHT: 65 IN

## 2018-08-24 DIAGNOSIS — M21.42 PES PLANUS OF BOTH FEET: ICD-10-CM

## 2018-08-24 DIAGNOSIS — S93.491A SPRAIN OF ANTERIOR TALOFIBULAR LIGAMENT OF RIGHT ANKLE, INITIAL ENCOUNTER: ICD-10-CM

## 2018-08-24 DIAGNOSIS — I10 HYPERTENSION, UNSPECIFIED TYPE: Primary | ICD-10-CM

## 2018-08-24 DIAGNOSIS — E11.49 TYPE II DIABETES MELLITUS WITH NEUROLOGICAL MANIFESTATIONS: Primary | ICD-10-CM

## 2018-08-24 DIAGNOSIS — M19.90 OSTEOARTHRITIS, UNSPECIFIED OSTEOARTHRITIS TYPE, UNSPECIFIED SITE: ICD-10-CM

## 2018-08-24 DIAGNOSIS — M20.42 HAMMER TOES OF BOTH FEET: ICD-10-CM

## 2018-08-24 DIAGNOSIS — E11.42 TYPE 2 DIABETES MELLITUS WITH DIABETIC POLYNEUROPATHY, WITH LONG-TERM CURRENT USE OF INSULIN: ICD-10-CM

## 2018-08-24 DIAGNOSIS — M25.571 ACUTE RIGHT ANKLE PAIN: ICD-10-CM

## 2018-08-24 DIAGNOSIS — M20.41 HAMMER TOES OF BOTH FEET: ICD-10-CM

## 2018-08-24 DIAGNOSIS — M21.41 PES PLANUS OF BOTH FEET: ICD-10-CM

## 2018-08-24 DIAGNOSIS — M25.571 ACUTE RIGHT ANKLE PAIN: Primary | ICD-10-CM

## 2018-08-24 DIAGNOSIS — L84 CORN OR CALLUS: ICD-10-CM

## 2018-08-24 DIAGNOSIS — R60.0 BILATERAL LOWER EXTREMITY EDEMA: ICD-10-CM

## 2018-08-24 DIAGNOSIS — Z79.4 TYPE 2 DIABETES MELLITUS WITH DIABETIC POLYNEUROPATHY, WITH LONG-TERM CURRENT USE OF INSULIN: ICD-10-CM

## 2018-08-24 DIAGNOSIS — D50.9 IRON DEFICIENCY ANEMIA, UNSPECIFIED IRON DEFICIENCY ANEMIA TYPE: ICD-10-CM

## 2018-08-24 DIAGNOSIS — Z86.73 HISTORY OF STROKE: ICD-10-CM

## 2018-08-24 DIAGNOSIS — E78.5 HYPERLIPIDEMIA, UNSPECIFIED HYPERLIPIDEMIA TYPE: ICD-10-CM

## 2018-08-24 DIAGNOSIS — E78.5 DYSLIPIDEMIA: ICD-10-CM

## 2018-08-24 DIAGNOSIS — Z12.31 ENCOUNTER FOR SCREENING MAMMOGRAM FOR BREAST CANCER: ICD-10-CM

## 2018-08-24 PROCEDURE — 99999 PR PBB SHADOW E&M-EST. PATIENT-LVL IV: CPT | Mod: PBBFAC,,, | Performed by: PHYSICIAN ASSISTANT

## 2018-08-24 PROCEDURE — 11721 DEBRIDE NAIL 6 OR MORE: CPT | Mod: 59,Q9,S$PBB, | Performed by: PODIATRIST

## 2018-08-24 PROCEDURE — 99999 PR PBB SHADOW E&M-EST. PATIENT-LVL III: CPT | Mod: PBBFAC,,, | Performed by: PODIATRIST

## 2018-08-24 PROCEDURE — 99213 OFFICE O/P EST LOW 20 MIN: CPT | Mod: PBBFAC,25,27 | Performed by: PODIATRIST

## 2018-08-24 PROCEDURE — 11721 DEBRIDE NAIL 6 OR MORE: CPT | Mod: Q9,PBBFAC | Performed by: PODIATRIST

## 2018-08-24 PROCEDURE — 99499 UNLISTED E&M SERVICE: CPT | Mod: S$PBB,,, | Performed by: PODIATRIST

## 2018-08-24 PROCEDURE — 11056 PARNG/CUTG B9 HYPRKR LES 2-4: CPT | Mod: Q9,PBBFAC | Performed by: PODIATRIST

## 2018-08-24 PROCEDURE — 99213 OFFICE O/P EST LOW 20 MIN: CPT | Mod: PBBFAC,25,27 | Performed by: INTERNAL MEDICINE

## 2018-08-24 PROCEDURE — 99213 OFFICE O/P EST LOW 20 MIN: CPT | Mod: S$PBB,,, | Performed by: PHYSICIAN ASSISTANT

## 2018-08-24 PROCEDURE — 73610 X-RAY EXAM OF ANKLE: CPT | Mod: TC,RT

## 2018-08-24 PROCEDURE — 99214 OFFICE O/P EST MOD 30 MIN: CPT | Mod: S$PBB,,, | Performed by: INTERNAL MEDICINE

## 2018-08-24 PROCEDURE — 73610 X-RAY EXAM OF ANKLE: CPT | Mod: 26,RT,, | Performed by: RADIOLOGY

## 2018-08-24 PROCEDURE — 99999 PR PBB SHADOW E&M-EST. PATIENT-LVL III: CPT | Mod: PBBFAC,,, | Performed by: INTERNAL MEDICINE

## 2018-08-24 PROCEDURE — 11056 PARNG/CUTG B9 HYPRKR LES 2-4: CPT | Mod: Q9,S$PBB,, | Performed by: PODIATRIST

## 2018-08-24 PROCEDURE — 99214 OFFICE O/P EST MOD 30 MIN: CPT | Mod: PBBFAC,25 | Performed by: PHYSICIAN ASSISTANT

## 2018-08-24 RX ORDER — CLOBETASOL PROPIONATE 0.5 MG/G
CREAM TOPICAL 2 TIMES DAILY PRN
Qty: 30 G | Refills: 2 | Status: SHIPPED | OUTPATIENT
Start: 2018-08-24 | End: 2019-04-17

## 2018-08-24 RX ORDER — CLOBETASOL PROPIONATE 0.5 MG/G
CREAM TOPICAL 2 TIMES DAILY PRN
Qty: 30 G | Refills: 2 | Status: SHIPPED | OUTPATIENT
Start: 2018-08-24 | End: 2018-08-24 | Stop reason: SDUPTHER

## 2018-08-24 RX ORDER — HYDROCORTISONE 25 MG/G
CREAM TOPICAL 2 TIMES DAILY PRN
Qty: 30 G | Refills: 2 | Status: SHIPPED | OUTPATIENT
Start: 2018-08-24 | End: 2018-12-05 | Stop reason: SDUPTHER

## 2018-08-24 RX ORDER — DICLOFENAC SODIUM 10 MG/G
2 GEL TOPICAL 3 TIMES DAILY
Qty: 400 G | Refills: 0 | Status: SHIPPED | OUTPATIENT
Start: 2018-08-24 | End: 2019-02-13

## 2018-08-24 NOTE — PROGRESS NOTES
Subjective:      Patient ID: Annika Mac is a 67 y.o. female.    Chief Complaint: Diabetes Mellitus (bilateral pcp Dr Cotton 3/13/18); Diabetic Foot Exam; and Nail Care    Annika is a 67 y.o. female who presents to the clinic for evaluation and treatment of high risk feet. Annika has a past medical history of Allergy, Anemia (7/27/2012), Arthritis, CHF (congestive heart failure), CKD (chronic kidney disease) stage 3, GFR 30-59 ml/min (7/27/2012), Clotting disorder, Colon polyp, Deep vein thrombophlebitis of left leg (7/27/2012), Degenerative disc disease, Diabetic peripheral neuropathy associated with type 2 diabetes mellitus (7/27/2012), GERD (gastroesophageal reflux disease), HTN (hypertension) (7/27/2012), Hyperlipidemia (7/27/2012), Lead-induced gout of ankle or foot, Nonproliferative diabetic retinopathy of right eye (7/27/2012), Obstructive sleep apnea (7/27/2012), Osteoporosis, Proliferative diabetic retinopathy of left eye (7/27/2012), Stroke (8/1/2003), Type 2 diabetes mellitus with diabetic polyneuropathy (7/27/2012), and Ulcer. The patient's chief complaint is long, thick toenails and calluses on both feet in need of trimming and debridement. Routine trimming helps. No other major pedal complaintsThis patient has documented high risk feet requiring routine maintenance secondary to diabetes mellitis and those secondary complications of diabetes, as mentioned..       PCP: Mamie Cotton MD    Date Last Seen by PCP:   Chief Complaint   Patient presents with    Diabetes Mellitus     bilateral pcp Dr Cotton 3/13/18    Diabetic Foot Exam    Nail Care       Chief Complaint   Patient presents with    Diabetes Mellitus     bilateral pcp Dr Cotton 3/13/18    Diabetic Foot Exam    Nail Care       Current shoe gear: black leather DM shoes w/ custom inserts     Hemoglobin A1C   Date Value Ref Range Status   06/08/2018 9.6 (H) 4.0 - 5.6 % Final     Comment:     ADA Screening Guidelines:  5.7-6.4%  Consistent  with prediabetes  >or=6.5%  Consistent with diabetes  High levels of fetal hemoglobin interfere with the HbA1C  assay. Heterozygous hemoglobin variants (HbS, HgC, etc)do  not significantly interfere with this assay.   However, presence of multiple variants may affect accuracy.     03/13/2018 9.0 (H) 4.0 - 5.6 % Final     Comment:     According to ADA guidelines, hemoglobin A1c <7.0% represents  optimal control in non-pregnant diabetic patients. Different  metrics may apply to specific patient populations.   Standards of Medical Care in Diabetes-2016.  For the purpose of screening for the presence of diabetes:  <5.7%     Consistent with the absence of diabetes  5.7-6.4%  Consistent with increasing risk for diabetes   (prediabetes)  >or=6.5%  Consistent with diabetes  Currently, no consensus exists for use of hemoglobin A1c  for diagnosis of diabetes for children.  This Hemoglobin A1c assay has significant interference with fetal   hemoglobin   (HbF). The results are invalid for patients with abnormal amounts of   HbF,   including those with known Hereditary Persistence   of Fetal Hemoglobin. Heterozygous hemoglobin variants (HbAS, HbAC,   HbAD, HbAE, HbA2) do not significantly interfere with this assay;   however, presence of multiple variants in a sample may impact the %   interference.     01/12/2018 10.4 (H) 4.0 - 5.6 % Final     Comment:     According to ADA guidelines, hemoglobin A1c <7.0% represents  optimal control in non-pregnant diabetic patients. Different  metrics may apply to specific patient populations.   Standards of Medical Care in Diabetes-2016.  For the purpose of screening for the presence of diabetes:  <5.7%     Consistent with the absence of diabetes  5.7-6.4%  Consistent with increasing risk for diabetes   (prediabetes)  >or=6.5%  Consistent with diabetes  Currently, no consensus exists for use of hemoglobin A1c  for diagnosis of diabetes for children.  This Hemoglobin A1c assay has significant  interference with fetal   hemoglobin   (HbF). The results are invalid for patients with abnormal amounts of   HbF,   including those with known Hereditary Persistence   of Fetal Hemoglobin. Heterozygous hemoglobin variants (HbAS, HbAC,   HbAD, HbAE, HbA2) do not significantly interfere with this assay;   however, presence of multiple variants in a sample may impact the %   interference.         Review of Systems   Constitution: Negative for chills, decreased appetite and fever.   Cardiovascular: Negative for leg swelling.   Skin: Positive for dry skin and nail changes. Negative for flushing and itching.   Musculoskeletal: Positive for arthritis. Negative for back pain, gout, joint pain, joint swelling and myalgias.   Gastrointestinal: Negative for nausea and vomiting.   Neurological: Positive for numbness. Negative for loss of balance and paresthesias.           Objective:      Physical Exam   Constitutional: She is oriented to person, place, and time. She appears well-developed and well-nourished. No distress.   Cardiovascular:   Dorsalis pedis and posterior tibial pulses are diminished Bilaterally. Toes are cool to touch. Feet are warm proximally.There is decreased digital hair. Skin is atrophic, slightly hyperpigmented, and mildly edematous       Musculoskeletal: Normal range of motion. She exhibits no edema, tenderness or deformity.   Equinus noted b/l ankles, gastroc. Adequate joint range of motion without pain, limitation, nor crepitation Bilateral pedal joints. Muscle strength is 5/5 in all groups bilaterally.        Neurological: She is alert and oriented to person, place, and time.   [place] 5.07 monofilamant testing is diminished [place] feet. Sharp/dull sensation diminished Bilaterally. Light touch absent Bilaterally.       Skin: Skin is warm, dry and intact. No abrasion, no bruising, no burn, no ecchymosis, no laceration, no lesion, no petechiae and no rash noted. She is not diaphoretic. No pallor.    Nails 1-5 b/l  are elongated by  2-3mm's, thickened by 3-5 mm's, dystrophic, and are darkened in  coloration . Xerosis Bilaterally. No open lesions noted.      Hyperkeratotic tissue noted to distal 2nd toe tips b/l. Skin lines present to these lesions, no sign of deep tissue injury.      Psychiatric: She has a normal mood and affect. Thought content normal.   Nursing note and vitals reviewed.            Assessment:       Encounter Diagnoses   Name Primary?    Type II diabetes mellitus with neurological manifestations Yes    Bilateral lower extremity edema     Pes planus of both feet     Corn or callus     Hammer toes of both feet          Plan:       Annika was seen today for diabetes mellitus, diabetic foot exam and nail care.    Diagnoses and all orders for this visit:    Type II diabetes mellitus with neurological manifestations  -     DIABETIC SHOES FOR HOME USE    Bilateral lower extremity edema  -     DIABETIC SHOES FOR HOME USE    Pes planus of both feet  -     DIABETIC SHOES FOR HOME USE    Corn or callus  -     DIABETIC SHOES FOR HOME USE    Hammer toes of both feet  -     DIABETIC SHOES FOR HOME USE      I counseled the patient on her conditions, their implications and medical management.        - Shoe inspection. Diabetic Foot Education. Patient reminded of the importance of good nutrition and blood sugar control to help prevent podiatric complications of diabetes. Patient instructed on proper foot hygeine. We discussed wearing proper shoe gear, daily foot inspections, never walking without protective shoe gear, never putting sharp instruments to feet, routine podiatric nail visits every 2-3 months.      - With patient's permission, nails were aggressively reduced and debrided x 10 to their soft tissue attachment mechanically and with electric , removing all offending nail and debris. Patient relates relief following the procedure. She will continue to monitor the areas daily, inspect her  feet, wear protective shoe gear when ambulatory, moisturizer to maintain skin integrity and follow in this office in approximately 2-3 months, sooner p.r.n.    - After cleansing the  area w/ alcohol prep pad the above mentioned hyperkeratosis was trimmed utilizing No 15 scapel, to a smooth base with out incident. Patient tolerated this  well and reported comfort to the area of distal 2nd toe b/l     Rx diabetic shoes with custom molded inserts to be worn at all times while ambulating. Prescription provided with list of local retailers.     Discussed regular and routine moisturizer to skin of both feet to help improve dry skin. Advised to apply twice daily until resolution of symptoms. Avoid between toes.     RTC 3 months, sooner PRN

## 2018-08-25 NOTE — PROGRESS NOTES
"Subjective:       Patient ID: Annika Mac is a 67 y.o. female.    Chief Complaint: Annual Exam  67 year old prestns for check up. She reports had fall since last visit. Her  called abruptly at night and she leaned over fell out of bed  And chipped her tooth. She is planning on some dental treatment soone. She denies cp or sob. She continues to follow with endcorinology on u500   Ups and downs on her sugar overall. She is also following with podiatry and had recent ortho appt. She has ankle injury using support to help with it  She is underoign gi work up for anemia and planning video capusele currentl had egd and colonsopcy  She has hemorrhids currnetly irritation bleeding at times     HPI  Review of Systems   Constitutional: Negative for fever.   Respiratory: Negative for cough, shortness of breath and wheezing.    Cardiovascular: Negative for chest pain and palpitations.   Gastrointestinal: Negative for abdominal pain and constipation.   Neurological: Negative for dizziness.       Objective:     Blood pressure 130/60, pulse 87, height 5' 5" (1.651 m), weight (!) 137 kg (302 lb), SpO2 98 %.    Physical Exam   Constitutional: No distress.   searted in wheelchair    HENT:   Head: Normocephalic.   Mouth/Throat: Oropharynx is clear and moist.   Loss tooth    Eyes: No scleral icterus.   Neck: Neck supple.   Cardiovascular: Normal rate and regular rhythm. Exam reveals no gallop and no friction rub.   Murmur heard.  Pulmonary/Chest: Effort normal and breath sounds normal. No respiratory distress.   Abdominal: Soft. Bowel sounds are normal. She exhibits no mass. There is no tenderness.   Genitourinary:   Genitourinary Comments: hemorroid no active  Bleeding    Musculoskeletal: She exhibits no edema.   Surgical scar knee  Right ankle wrapped    Neurological: She is alert.   Slow speech prior stroke    Skin: No erythema.   Psychiatric: She has a normal mood and affect.   Vitals reviewed.      Assessment:       1. " Hypertension, unspecified type    2. Encounter for screening mammogram for breast cancer    3. Hyperlipidemia, unspecified hyperlipidemia type    4. Uncontrolled type 2 diabetes mellitus with both eyes affected by proliferative retinopathy without macular edema, with long-term current use of insulin    5. Osteoarthritis, unspecified osteoarthritis type, unspecified site    6. Type 2 diabetes mellitus with diabetic polyneuropathy, with long-term current use of insulin    7. History of stroke    8. Dyslipidemia    9. Iron deficiency anemia, unspecified iron deficiency anemia type        Plan:       Annika was seen today for annual exam.    Diagnoses and all orders for this visit:    Hypertension, unspecified type  bp controlled continue current regimine     Encounter for screening mammogram for breast cancer  -     Mammo Digital Screening Bilat with CAD; Future    Hyperlipidemia, unspecified hyperlipidemia type    Uncontrolled type 2 diabetes mellitus with both eyes affected by proliferative retinopathy without macular edema, with long-term current use of insulin    Osteoarthritis, unspecified osteoarthritis type, unspecified site    Type 2 diabetes mellitus with diabetic polyneuropathy, with long-term current use of insulin  She is following with endcorinology on u500     History of stroke  Remains on asprin statin    Dyslipidemia  Continue statin     Iron deficiency anemia, unspecified iron deficiency anemia type  She is following with gi for evaluaton had egd/colon now video     hemorhioid discussed  -     hydrocortisone (ANUSOL-HC) 2.5 % rectal cream; Place rectally 2 (two) times daily as needed for Hemorrhoids    Dermatitis hand continue moistureize refill provided .  -     clobetasol (TEMOVATE) 0.05 % cream; Apply topically 2 (two) times daily as needed.    Follow up 4-6 month

## 2018-08-27 ENCOUNTER — PATIENT MESSAGE (OUTPATIENT)
Dept: CARDIOLOGY | Facility: CLINIC | Age: 68
End: 2018-08-27

## 2018-08-27 NOTE — TELEPHONE ENCOUNTER
I discussed with patient over the phone about her borderline kidney function and high blood sugars which she is getting under control. She will continue to check her BP at home and take her meds.

## 2018-09-06 ENCOUNTER — TELEPHONE (OUTPATIENT)
Dept: HEMATOLOGY/ONCOLOGY | Facility: CLINIC | Age: 68
End: 2018-09-06

## 2018-09-10 ENCOUNTER — TELEPHONE (OUTPATIENT)
Dept: HEMATOLOGY/ONCOLOGY | Facility: CLINIC | Age: 68
End: 2018-09-10

## 2018-09-10 NOTE — TELEPHONE ENCOUNTER
----- Message from Shari Martins RN sent at 9/10/2018  1:52 PM CDT -----  Contact: Pt   Patient returned her missed call.     ----- Message -----  From: Kali Guaman  Sent: 9/10/2018   1:13 PM  To: Jennifer Love Staff    Pt missed a call and would like the nurse to return their call.        Pt can be reached at [phone].

## 2018-09-12 ENCOUNTER — LAB VISIT (OUTPATIENT)
Dept: LAB | Facility: HOSPITAL | Age: 68
End: 2018-09-12
Attending: NURSE PRACTITIONER
Payer: MEDICARE

## 2018-09-12 DIAGNOSIS — M10.9 ACUTE GOUT OF LEFT ELBOW, UNSPECIFIED CAUSE: ICD-10-CM

## 2018-09-12 LAB — URATE SERPL-MCNC: 6.9 MG/DL

## 2018-09-12 PROCEDURE — 84550 ASSAY OF BLOOD/URIC ACID: CPT

## 2018-09-13 ENCOUNTER — TELEPHONE (OUTPATIENT)
Dept: GASTROENTEROLOGY | Facility: CLINIC | Age: 68
End: 2018-09-13

## 2018-09-13 NOTE — TELEPHONE ENCOUNTER
----- Message from Shelby Mclean sent at 9/13/2018  9:10 AM CDT -----  Contact: pt  Pt called asking for advice about her care also she would like to get results from the video capsule    Pt can be reached at [phone] or [phone]

## 2018-09-14 NOTE — TELEPHONE ENCOUNTER
Attempted to return patient  phone call with no success.    Left message for patient to call clinic.

## 2018-09-17 NOTE — PROGRESS NOTES
"CC: This 67 y.o. female presents for management of Diabetes Mellitus    HPI: Diagnosed with T2DM in July 1987 when c/o polydipsia and polyuria. Started oral agents then NPH and Regular insulin. Converted to MDI with Lantus and Novolog in 2/2006 after knee surgery. D/c TZD r/t weight gain 7/08 and added Symlin. Stopped Symlin 2/09. Januvia added 2/10. D/C Januvia 12/10 r/t cost; resumed though pt assistance in 2016. Converted to U500 in 7/10.   Received DM Education 1/22/08.   11/2013 she experienced severe hypoglycemia at home,  called 911, "nonresponsive" so brought to local ED for hypoglycemia BG 30s in 11/2013.     Medical conditions also include obesity, RUFINA, diastolic dysfunction, h/o stroke, PAD, and CKD 3.  Follows with cardiology, vascular medicine, and nephrology.   Also has chronic back pain, which limits activity and contributes to wt gain. Has followed with physical medicine.   Has chronic cough 2/2 GERD.   DM complicated with PDR with ME, PN.    Since her last visit she has had 6 teeth removed 8/15/2018 had to take abx & pain medicine and her BG have been elevated during this time      Ref. Range 9/12/2018 11:27   Fructosamine Latest Ref Range: SeeBelow umol /L 432      Ref. Range 9/12/2018 11:27   Glucose Latest Ref Range: 70 - 110 mg/dL 219 (H)     She is again on U500 as Overall BGs were markedly elevated.    Readings Check 3-4x daily.      She has been working on her diet as well.     Denies hypoglycemia.  Knows how to correct with 15 grams of carbs- juice, coke, or a peppermint.     DM ed reports to have good knowledge of CHO portions.     CURRENT DM MEDS:   Humulin U500 pen: breakfast dose 140-120 U for smaller breakfast, lunch dose 100 ufor bigger lunch increase to 90U; dinner dose 65units/ for smaller dinner, decrease dose to 50 units.   Victoza 1.2 mg     Denies missed doses.  Has Medicare Extra Help    Eats 3 meals daily, + snacks  Drinks water, unsweet tea.   No formal " "exercise.     Diabetes Management Status  Statin: Taking  ACE/ARB: Taking  Screening or Prevention Patient's value Goal Complete/Controlled?   HgA1C Testing and Control   Lab Results   Component Value Date    HGBA1C 9.6 (H) 06/08/2018      Annually/Less than 8% No   Lipid profile : 04/23/2018 Annually Yes   LDL control Lab Results   Component Value Date    LDLCALC 72.0 04/23/2018    Annually/Less than 100 mg/dl  Yes   Nephropathy screening Lab Results   Component Value Date    LABMICR 10.0 10/13/2017     Lab Results   Component Value Date    PROTEINUA Negative 05/16/2017    Annually Yes   Blood pressure BP Readings from Last 1 Encounters:   09/19/18 124/84    Less than 140/90 Yes   Dilated retinal exam : 10/24/2017 Annually Yes   Foot exam   : 05/18/2018 Annually No     Denies history of pancreatitis & personal/family history of medullary thyroid cancer.     ROS:   Gen: Appetite good, denies fatigue and weakness.  Skin: No rashes, no hair changes.  Eyes: Denies visual disturbances  Resp: no SOB or MCFADDEN, no cough today  Cardiac: No palpitations, chest pain, no edema   GI: No nausea or vomiting, diarrhea, constipation, or abdominal pain.  MS/Neuro: Denies numbness/ tingling in BLE; Gait steady, speech clear  Chronic back pain.   Psych: Denies drug/ETOH abuse, no hx of depression.  Other systems: negative.    PE: /84   Pulse 74   Ht 5' 5" (1.651 m)   Wt (!) 137 kg (302 lb)   BMI 50.26 kg/m²     GENERAL: Well developed, well nourished.  PSYCH: AAOx3, appropriate mood and affect, pleasant expression, conversant, appears relaxed, well groomed.   EYES: Conjunctiva, corneas clear  NECK: Supple, trachea midline.  CHEST: Resp even and unlabored  CARDIAC: RRR, +2/6 murmur lower left sternal border  ABDOMEN: Soft, non-tender, non-distended; +BSs x4  VASCULAR: DP pulses +2/4 bilaterally, mild edema.  NEURO: Gait steady  SKIN: Skin warm and dry, + acanthosis nigracans. Raised, erythremic rash on left hand.   FEET:  " "Appropriate footwear, Foot exam deferred, done 05/2018  Injection sites are ok. No lipo hypertropthy or atrophy.     ASSESSMENT and PLAN:  1. Type 2 diabetes mellitus with diabetic polyneuropathy  TRUETEST TEST STRIPS Strp    lancets 31 gauge Misc    BD ULTRA-FINE KIKA PEN NEEDLE 32 gauge x 5/32" Ndle    Comprehensive metabolic panel    Hemoglobin A1c    Fructosamine    Ambulatory Referral to Diabetes Education    Ambulatory Referral to Dermatology   2. Polyneuropathy associated with underlying disease     3. 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 Type 2 DM with CKD stage 4 and hypertension     5. Dyslipidemia     6. Essential hypertension     7. Anemia, unspecified type        1T2DM: uncontrolled, with neuropathy, retinopathy, nephropathy.   -- Reviewed goals of therapy are to get the best control we can without hypoglycemia  -- Change u500 140 (120 small) breakfast, 90 (80 small lunch), & 75 (65 small dinner)  -- continue victoza 1.2 mg daily   -- Reviewed patient's current insulin regimen. Clarified proper insulin dose and timing in relation to meals, etc. Insulin injection sites and proper rotation instructed.    -- Advised frequent self blood glucose monitoring.  Patient encouraged to document glucose results and bring them to every clinic visit. Send logs in 1 month.  -- Hypoglycemia precautions discussed. Instructed on precautions before driving.    -- Call for Bg repeatedly < 90 or > 180.   -- Close adherence to lifestyle changes recommended.   -- Periodic follow ups for eye evaluations, foot care and dental care suggested.     - takes ASA, ARB, statin     2. PDR with macular edema - optimize BG. Continue follow with Ophth.       3. CKD - GFR 35.2, on ACEi therapy. No metformin or SLGT2i. Saw nephrology 03/2018.     4. Peripheral neuropathy - on Lyrica and Cymbalta with relief.      5. HLP " - Controlled. Continue statin.      6. Morbid obesity   Increases insulin resistance. Chronic pain limits physical activity.  Discussed medical wt loss. She is not interested in wt management at this time.     7. HTN - Controlled. On ARB.      Anemia  -- can alter a1c levels, check fructosamine as well.     Referral  To dermatology for rash on her left hand    Follow-up in about 3 months (around 12/19/2018).   Labs prior    Case discussed with Staff   Recommendations were discussed with the patient in detail  The patient verbalized understanding and agrees with the plan outlined as above.

## 2018-09-19 ENCOUNTER — OFFICE VISIT (OUTPATIENT)
Dept: ENDOCRINOLOGY | Facility: CLINIC | Age: 68
End: 2018-09-19
Payer: MEDICARE

## 2018-09-19 VITALS
BODY MASS INDEX: 48.82 KG/M2 | WEIGHT: 293 LBS | HEIGHT: 65 IN | DIASTOLIC BLOOD PRESSURE: 84 MMHG | HEART RATE: 74 BPM | SYSTOLIC BLOOD PRESSURE: 124 MMHG

## 2018-09-19 DIAGNOSIS — E78.5 DYSLIPIDEMIA: Chronic | ICD-10-CM

## 2018-09-19 DIAGNOSIS — E11.42 TYPE 2 DIABETES MELLITUS WITH DIABETIC POLYNEUROPATHY: Primary | ICD-10-CM

## 2018-09-19 DIAGNOSIS — I10 ESSENTIAL HYPERTENSION: ICD-10-CM

## 2018-09-19 DIAGNOSIS — I12.9 TYPE 2 DM WITH CKD STAGE 4 AND HYPERTENSION: ICD-10-CM

## 2018-09-19 DIAGNOSIS — G63 POLYNEUROPATHY ASSOCIATED WITH UNDERLYING DISEASE: ICD-10-CM

## 2018-09-19 DIAGNOSIS — E11.22 TYPE 2 DM WITH CKD STAGE 4 AND HYPERTENSION: ICD-10-CM

## 2018-09-19 DIAGNOSIS — D64.9 ANEMIA, UNSPECIFIED TYPE: Chronic | ICD-10-CM

## 2018-09-19 DIAGNOSIS — N18.4 TYPE 2 DM WITH CKD STAGE 4 AND HYPERTENSION: ICD-10-CM

## 2018-09-19 PROCEDURE — 99215 OFFICE O/P EST HI 40 MIN: CPT | Mod: PBBFAC | Performed by: NURSE PRACTITIONER

## 2018-09-19 PROCEDURE — 99214 OFFICE O/P EST MOD 30 MIN: CPT | Mod: S$PBB,,, | Performed by: NURSE PRACTITIONER

## 2018-09-19 PROCEDURE — 99999 PR PBB SHADOW E&M-EST. PATIENT-LVL V: CPT | Mod: PBBFAC,,, | Performed by: NURSE PRACTITIONER

## 2018-09-19 RX ORDER — PEN NEEDLE, DIABETIC 31 GX5/16"
NEEDLE, DISPOSABLE MISCELLANEOUS
Qty: 200 EACH | Refills: 20 | Status: ON HOLD | OUTPATIENT
Start: 2018-09-19 | End: 2019-05-25 | Stop reason: SDUPTHER

## 2018-09-19 RX ORDER — PEN NEEDLE, DIABETIC 30 GX3/16"
NEEDLE, DISPOSABLE MISCELLANEOUS
Qty: 400 EACH | Refills: 12 | Status: CANCELLED | OUTPATIENT
Start: 2018-09-19

## 2018-09-19 RX ORDER — BIOTIN 1 MG
1 TABLET ORAL 4 TIMES DAILY
Qty: 400 STRIP | Refills: 4 | Status: SHIPPED | OUTPATIENT
Start: 2018-09-19 | End: 2019-05-27 | Stop reason: SDUPTHER

## 2018-09-23 DIAGNOSIS — E11.43 DIABETIC AUTONOMIC NEUROPATHY ASSOCIATED WITH TYPE 2 DIABETES MELLITUS: ICD-10-CM

## 2018-09-23 DIAGNOSIS — M25.561 ACUTE PAIN OF RIGHT KNEE: ICD-10-CM

## 2018-09-23 DIAGNOSIS — M17.11 PRIMARY OSTEOARTHRITIS OF RIGHT KNEE: ICD-10-CM

## 2018-09-23 DIAGNOSIS — H26.493 PCO (POSTERIOR CAPSULAR OPACIFICATION), BILATERAL: ICD-10-CM

## 2018-09-23 DIAGNOSIS — G89.29 CHRONIC LOW BACK PAIN WITH SCIATICA, SCIATICA LATERALITY UNSPECIFIED, UNSPECIFIED BACK PAIN LATERALITY: ICD-10-CM

## 2018-09-23 DIAGNOSIS — M54.40 CHRONIC LOW BACK PAIN WITH SCIATICA, SCIATICA LATERALITY UNSPECIFIED, UNSPECIFIED BACK PAIN LATERALITY: ICD-10-CM

## 2018-09-23 DIAGNOSIS — M47.816 LUMBAR FACET ARTHROPATHY: ICD-10-CM

## 2018-09-23 DIAGNOSIS — M48.061 SPINAL STENOSIS OF LUMBAR REGION, UNSPECIFIED WHETHER NEUROGENIC CLAUDICATION PRESENT: ICD-10-CM

## 2018-09-24 RX ORDER — DULOXETIN HYDROCHLORIDE 30 MG/1
CAPSULE, DELAYED RELEASE ORAL
Qty: 90 CAPSULE | Refills: 1 | Status: SHIPPED | OUTPATIENT
Start: 2018-09-24 | End: 2019-02-13 | Stop reason: SDUPTHER

## 2018-10-02 ENCOUNTER — OFFICE VISIT (OUTPATIENT)
Dept: DERMATOLOGY | Facility: CLINIC | Age: 68
End: 2018-10-02
Payer: MEDICARE

## 2018-10-02 DIAGNOSIS — R20.9 SKIN SENSATION DISTURBANCE: ICD-10-CM

## 2018-10-02 DIAGNOSIS — L91.8 SKIN TAG: Primary | ICD-10-CM

## 2018-10-02 DIAGNOSIS — L30.9 HAND ECZEMA: ICD-10-CM

## 2018-10-02 PROCEDURE — 11200 RMVL SKIN TAGS UP TO&INC 15: CPT | Mod: PBBFAC | Performed by: NURSE PRACTITIONER

## 2018-10-02 PROCEDURE — 99213 OFFICE O/P EST LOW 20 MIN: CPT | Mod: 25,S$PBB,ICN, | Performed by: NURSE PRACTITIONER

## 2018-10-02 PROCEDURE — 11200 RMVL SKIN TAGS UP TO&INC 15: CPT | Mod: S$PBB,ICN,, | Performed by: NURSE PRACTITIONER

## 2018-10-02 PROCEDURE — 99213 OFFICE O/P EST LOW 20 MIN: CPT | Mod: PBBFAC | Performed by: NURSE PRACTITIONER

## 2018-10-02 PROCEDURE — 99999 PR PBB SHADOW E&M-EST. PATIENT-LVL III: CPT | Mod: PBBFAC,,, | Performed by: NURSE PRACTITIONER

## 2018-10-02 RX ORDER — COLCHICINE 0.6 MG/1
TABLET, FILM COATED ORAL
COMMUNITY
Start: 2018-09-28 | End: 2018-11-29

## 2018-10-02 RX ORDER — ALLOPURINOL 100 MG/1
TABLET ORAL
COMMUNITY
Start: 2018-09-22 | End: 2018-10-30 | Stop reason: SDUPTHER

## 2018-10-02 RX ORDER — BETAMETHASONE DIPROPIONATE 0.5 MG/G
OINTMENT, AUGMENTED TOPICAL
Qty: 45 G | Refills: 0 | Status: SHIPPED | OUTPATIENT
Start: 2018-10-02 | End: 2019-02-13

## 2018-10-02 RX ORDER — BETAMETHASONE DIPROPIONATE 0.5 MG/G
OINTMENT TOPICAL
Qty: 45 G | Refills: 0 | OUTPATIENT
Start: 2018-10-02 | End: 2018-10-02

## 2018-10-02 NOTE — LETTER
October 2, 2018      Ursula Salinas, KEMAR  [address] [address]           [facility]  [address]  [facility] [address]  Phone: [phone]  Fax: [phone]          Patient: Annika Mac   MR Number: [id number]   YOB: 1950   Date of Visit: 10/2/2018       Dear Ursula Salinas:    Thank you for referring Annika Mac to me for evaluation. Attached you will find relevant portions of my assessment and plan of care.    If you have questions, please do not hesitate to call me. I look forward to following Annika Mac along with you.    Sincerely,    Lynne Spaulding, KEMAR    Enclosure  CC:  No Recipients    If you would like to receive this communication electronically, please contact [email or web address] or [phone] to request more information on [employer] access.    For providers and/or their staff who would like to refer a patient to [facility], please contact us through our one-stop-shop provider referral line, [facility], at [phone].    If you feel you have received this communication in error or would no longer like to receive these types of communications, please e-mail [email or web address]

## 2018-10-02 NOTE — PATIENT INSTRUCTIONS
Wound Care    A band aid and vaseline ointment has been placed over the site.  This should remain in place for 24 hours.  It is recommended that you keep the area dry for the first 24 hours.  After 24 hours, you may remove the band aid and wash the area with warm soap and water and apply Vaseline jelly.  Many patients prefer to use Neosporin or Bacitracin ointment.  This is acceptable; however, know that you can develop an allergy to this medication even if you have used it safely for years.  It is important to keep the area moist.  Letting it dry out and get air slows healing time, and will worsen the scar.  Band aid is optional after first 24 hours.      If you notice increasing redness, tenderness, pain, or yellow drainage at the site, please notify your doctor.  These are signs of an infection.    If your site is bleeding, apply firm pressure for 15 minutes straight.  Repeat for another 15 minutes, if it is still bleeding.   If the surgical site continues to bleed, then please contact your doctor.      [address] [place] [address]/ [phone] FAX/ [email or web address]

## 2018-10-02 NOTE — PROGRESS NOTES
Subjective:       Patient ID:  Annika Mac is a 67 y.o. female who presents for   Chief Complaint   Patient presents with    Rash     fingers, [id number], dry and itchy     Rash  - Initial  Affected locations: left fingers and right fingers  Duration: 6 months  Signs / symptoms: itching  Severity: mild  Timing: constant  Aggravated by: sweating (reports hands frequently in water. Uses vivi  soap & pine sol for mopping. )  Treatments tried: clobetasol cream BID. jergens original lotion. Vaseline throughout the day.    Skin Tags  - Initial  Affected locations: neck  Duration: 1 year  Signs / symptoms: irritated  Severity: mild  Timing: constant  Aggravated by: friction  Relieving factors/Treatments tried: nothing  Improvement on treatment: no relief        Review of Systems   Constitutional:        Denies smoking.    Skin: Positive for itching and rash. Negative for daily sunscreen use and activity-related sunscreen use.        Denies h/o atopic derm        Objective:    Physical Exam   Constitutional: She appears well-developed and well-nourished. She is obese.  No distress.   Neurological: She is alert and oriented to person, place, and time. She is not disoriented.   Psychiatric: She has a normal mood and affect.   Skin:   Areas Examined (abnormalities noted in diagram):   Head / Face Inspection Performed  Neck Inspection Performed  Chest / Axilla Inspection Performed  Nails and Digits Inspection Performed                  Diagram Legend     Erythematous scaling macule/papule c/w actinic keratosis       Vascular papule c/w angioma      Pigmented verrucoid papule/plaque c/w seborrheic keratosis      Yellow umbilicated papule c/w sebaceous hyperplasia      Irregularly shaped tan macule c/w lentigo     1-2 mm smooth white papules consistent with Milia      Movable subcutaneous cyst with punctum c/w epidermal inclusion cyst      Subcutaneous movable cyst c/w pilar cyst      Firm pink to brown papule c/w  dermatofibroma      Pedunculated fleshy papule(s) c/w skin tag(s)      Evenly pigmented macule c/w junctional nevus     Mildly variegated pigmented, slightly irregular-bordered macule c/w mildly atypical nevus      Flesh colored to evenly pigmented papule c/w intradermal nevus       Pink pearly papule/plaque c/w basal cell carcinoma      Erythematous hyperkeratotic cursted plaque c/w SCC      Surgical scar with no sign of skin cancer recurrence      Open and closed comedones      Inflammatory papules and pustules      Verrucoid papule consistent consistent with wart     Erythematous eczematous patches and plaques     Dystrophic onycholytic nail with subungual debris c/w onychomycosis     Umbilicated papule    Erythematous-base heme-crusted tan verrucoid plaque consistent with inflamed seborrheic keratosis     Erythematous Silvery Scaling Plaque c/w Psoriasis     See annotation                  Assessment / Plan:        Skin tag with Skin sensation disturbance  Verbal consent obtained. 1 lesions removed with scissor snip removal after anesthesia with 1% lidocaine with epinephrine. Hemostasis achieved with aluminum chloride and hyfrecation. No complications.    Hand eczema  -     augmented betamethasone dipropionate (DIPROLENE-AF) 0.05 % ointment; AAA bid  Dispense: 45 g; Refill: 0    Recommend Cetaphil therapeutic hand cream q hand washing and q hour while awake.    Recommend Vaseline jelly under white cotton gloves qhs.    Fredy's working hands    Wear gloves while washing dishes or mopping.            Follow-up in about 1 month (around 11/2/2018).

## 2018-10-03 ENCOUNTER — PATIENT OUTREACH (OUTPATIENT)
Dept: DIABETES | Facility: CLINIC | Age: 68
End: 2018-10-03

## 2018-10-04 ENCOUNTER — TELEPHONE (OUTPATIENT)
Dept: ENDOCRINOLOGY | Facility: CLINIC | Age: 68
End: 2018-10-04

## 2018-10-04 ENCOUNTER — PATIENT OUTREACH (OUTPATIENT)
Dept: DIABETES | Facility: CLINIC | Age: 68
End: 2018-10-04

## 2018-10-04 NOTE — TELEPHONE ENCOUNTER
----- Message from Ursula Salinas NP sent at 10/4/2018 11:22 AM CDT -----  Call and ask if she is on steroids? Tell her she needs to try to follow a diabetic diet.    Thank you,   Ursula     ----- Message -----  From: Tamar Yates RD, CDE  Sent: 10/4/2018  10:24 AM  To: Ursula Salinas NP    Patient sent in [facility] logs - please see attached. Thanks

## 2018-10-04 NOTE — TELEPHONE ENCOUNTER
Spoke with the pt and pt stated that she is off the steroid now and advise her to follow diabetic diet. Pt verbalized understand.

## 2018-10-08 ENCOUNTER — TELEPHONE (OUTPATIENT)
Dept: INTERNAL MEDICINE | Facility: CLINIC | Age: 68
End: 2018-10-08

## 2018-10-10 ENCOUNTER — TELEPHONE (OUTPATIENT)
Dept: GASTROENTEROLOGY | Facility: CLINIC | Age: 68
End: 2018-10-10

## 2018-10-10 DIAGNOSIS — T18.9XXA: Primary | ICD-10-CM

## 2018-10-10 NOTE — [employer] PATIENT INSTRUCTIONS
Discharge Summary/Instructions for after Video Capsule Endoscopy  Patient Name: Annika Mac  Patient MRN: [id number]  Patient YOB: 1950 Friday, August 24, 2018  Dangelo Vega MD  This is a 67 year old female.  1.  Do Not eat or drink anything for 1 hour.  Try sips of water first.  If   tolerated, resume your regular diet or one recommended by your physician.  2.  Do not drive, operate machinery, make critical decisions, or do   activities that require coordination or balance for 24 hours.  3.   You may experience a sore throat for 24 to 48 hours.  You may use   throat lozenges or gargle with warm salt water to relieve the discomfort.  4.  Because air was put into your stomach during the procedure, you may   experience some belching.  5.  Do not use any medication containing aspirin for 10 days.  6.  Go directly to the emergency room if you notice any of the following:   Chills and/or fever over 101 F   Persistent vomiting or vomiting with blood/nasal regurgitation   Severe abdominal pain, other than gas cramps   Severe chest pain   Black, tarry stools     Your doctor recommends these additional instructions:  Return to your GI clinic.  If you have any questions or problems, please call your physician.  EMERGENCY PHONE NUMBER: [phone]  LAB RESULTS: [phone]  Dangelo Vega MD  10/10/2018 5:05:21 PM  This report has been verified and signed electronically.  [employer]

## 2018-10-10 NOTE — TELEPHONE ENCOUNTER
Video capsule study results have been finalized.  Unfortunately, the study was incomplete as it did not enter the colon during the recording indicating that there are portions of the small intestines that were not seen by the camera.      Need to find out if she saw the capsule camera in her stool.  If not, then she will need an abdominal x-ray to ensure it is not still in her intestinal tract.

## 2018-10-11 ENCOUNTER — OFFICE VISIT (OUTPATIENT)
Dept: RHEUMATOLOGY | Facility: CLINIC | Age: 68
End: 2018-10-11
Payer: MEDICARE

## 2018-10-11 ENCOUNTER — LAB VISIT (OUTPATIENT)
Dept: LAB | Facility: HOSPITAL | Age: 68
End: 2018-10-11
Attending: INTERNAL MEDICINE
Payer: MEDICARE

## 2018-10-11 ENCOUNTER — OFFICE VISIT (OUTPATIENT)
Dept: PHYSICAL MEDICINE AND REHAB | Facility: CLINIC | Age: 68
End: 2018-10-11
Payer: MEDICARE

## 2018-10-11 ENCOUNTER — OFFICE VISIT (OUTPATIENT)
Dept: HEMATOLOGY/ONCOLOGY | Facility: CLINIC | Age: 68
End: 2018-10-11
Payer: MEDICARE

## 2018-10-11 VITALS
WEIGHT: 293 LBS | HEART RATE: 71 BPM | SYSTOLIC BLOOD PRESSURE: 120 MMHG | BODY MASS INDEX: 48.82 KG/M2 | HEIGHT: 65 IN | DIASTOLIC BLOOD PRESSURE: 64 MMHG

## 2018-10-11 VITALS
BODY MASS INDEX: 48.82 KG/M2 | WEIGHT: 293 LBS | DIASTOLIC BLOOD PRESSURE: 81 MMHG | HEART RATE: 72 BPM | HEIGHT: 65 IN | SYSTOLIC BLOOD PRESSURE: 145 MMHG

## 2018-10-11 VITALS
TEMPERATURE: 98 F | SYSTOLIC BLOOD PRESSURE: 148 MMHG | BODY MASS INDEX: 49.42 KG/M2 | RESPIRATION RATE: 16 BRPM | OXYGEN SATURATION: 98 % | HEART RATE: 71 BPM | WEIGHT: 293 LBS | DIASTOLIC BLOOD PRESSURE: 70 MMHG

## 2018-10-11 DIAGNOSIS — R26.81 GAIT INSTABILITY: ICD-10-CM

## 2018-10-11 DIAGNOSIS — M47.816 LUMBAR FACET ARTHROPATHY: ICD-10-CM

## 2018-10-11 DIAGNOSIS — D64.9 ANEMIA, UNSPECIFIED TYPE: Chronic | ICD-10-CM

## 2018-10-11 DIAGNOSIS — M17.11 PRIMARY OSTEOARTHRITIS OF RIGHT KNEE: ICD-10-CM

## 2018-10-11 DIAGNOSIS — G63 POLYNEUROPATHY ASSOCIATED WITH UNDERLYING DISEASE: ICD-10-CM

## 2018-10-11 DIAGNOSIS — D64.9 ANEMIA, UNSPECIFIED TYPE: Primary | ICD-10-CM

## 2018-10-11 DIAGNOSIS — M25.561 ACUTE PAIN OF RIGHT KNEE: ICD-10-CM

## 2018-10-11 DIAGNOSIS — H26.493 PCO (POSTERIOR CAPSULAR OPACIFICATION), BILATERAL: ICD-10-CM

## 2018-10-11 DIAGNOSIS — M54.40 CHRONIC LOW BACK PAIN WITH SCIATICA, SCIATICA LATERALITY UNSPECIFIED, UNSPECIFIED BACK PAIN LATERALITY: ICD-10-CM

## 2018-10-11 DIAGNOSIS — E11.43 DIABETIC AUTONOMIC NEUROPATHY ASSOCIATED WITH TYPE 2 DIABETES MELLITUS: ICD-10-CM

## 2018-10-11 DIAGNOSIS — M48.062 SPINAL STENOSIS OF LUMBAR REGION WITH NEUROGENIC CLAUDICATION: Primary | ICD-10-CM

## 2018-10-11 DIAGNOSIS — M10.09 IDIOPATHIC GOUT OF MULTIPLE SITES, UNSPECIFIED CHRONICITY: Primary | ICD-10-CM

## 2018-10-11 DIAGNOSIS — M48.061 SPINAL STENOSIS OF LUMBAR REGION, UNSPECIFIED WHETHER NEUROGENIC CLAUDICATION PRESENT: ICD-10-CM

## 2018-10-11 DIAGNOSIS — M47.22 OSTEOARTHRITIS OF SPINE WITH RADICULOPATHY, CERVICAL REGION: ICD-10-CM

## 2018-10-11 DIAGNOSIS — Z86.73 HISTORY OF STROKE: ICD-10-CM

## 2018-10-11 DIAGNOSIS — G89.29 CHRONIC LOW BACK PAIN WITH SCIATICA, SCIATICA LATERALITY UNSPECIFIED, UNSPECIFIED BACK PAIN LATERALITY: ICD-10-CM

## 2018-10-11 LAB
BASOPHILS # BLD AUTO: 0.03 K/UL
BASOPHILS NFR BLD: 0.5 %
DIFFERENTIAL METHOD: ABNORMAL
EOSINOPHIL # BLD AUTO: 0.4 K/UL
EOSINOPHIL NFR BLD: 6 %
ERYTHROCYTE [DISTWIDTH] IN BLOOD BY AUTOMATED COUNT: 14.5 %
HCT VFR BLD AUTO: 32.1 %
HGB BLD-MCNC: 9.8 G/DL
IMM GRANULOCYTES # BLD AUTO: 0.02 K/UL
IMM GRANULOCYTES NFR BLD AUTO: 0.3 %
LYMPHOCYTES # BLD AUTO: 1.6 K/UL
LYMPHOCYTES NFR BLD: 27 %
MCH RBC QN AUTO: 28.1 PG
MCHC RBC AUTO-ENTMCNC: 30.5 G/DL
MCV RBC AUTO: 92 FL
MONOCYTES # BLD AUTO: 0.7 K/UL
MONOCYTES NFR BLD: 10.9 %
NEUTROPHILS # BLD AUTO: 3.3 K/UL
NEUTROPHILS NFR BLD: 55.3 %
NRBC BLD-RTO: 0 /100 WBC
PLATELET # BLD AUTO: 228 K/UL
PMV BLD AUTO: 10.4 FL
RBC # BLD AUTO: 3.49 M/UL
WBC # BLD AUTO: 5.99 K/UL

## 2018-10-11 PROCEDURE — 85025 COMPLETE CBC W/AUTO DIFF WBC: CPT

## 2018-10-11 PROCEDURE — 99214 OFFICE O/P EST MOD 30 MIN: CPT | Mod: S$PBB,,, | Performed by: INTERNAL MEDICINE

## 2018-10-11 PROCEDURE — 99214 OFFICE O/P EST MOD 30 MIN: CPT | Mod: S$PBB,,, | Performed by: PHYSICAL MEDICINE & REHABILITATION

## 2018-10-11 PROCEDURE — 99213 OFFICE O/P EST LOW 20 MIN: CPT | Mod: PBBFAC,27 | Performed by: PHYSICAL MEDICINE & REHABILITATION

## 2018-10-11 PROCEDURE — 99213 OFFICE O/P EST LOW 20 MIN: CPT | Mod: PBBFAC,27 | Performed by: INTERNAL MEDICINE

## 2018-10-11 PROCEDURE — 99999 PR PBB SHADOW E&M-EST. PATIENT-LVL III: CPT | Mod: PBBFAC,,, | Performed by: INTERNAL MEDICINE

## 2018-10-11 PROCEDURE — 99999 PR PBB SHADOW E&M-EST. PATIENT-LVL III: CPT | Mod: PBBFAC,,, | Performed by: PHYSICAL MEDICINE & REHABILITATION

## 2018-10-11 PROCEDURE — 99213 OFFICE O/P EST LOW 20 MIN: CPT | Mod: PBBFAC | Performed by: INTERNAL MEDICINE

## 2018-10-11 PROCEDURE — 99213 OFFICE O/P EST LOW 20 MIN: CPT | Mod: S$PBB,,, | Performed by: INTERNAL MEDICINE

## 2018-10-11 PROCEDURE — 36415 COLL VENOUS BLD VENIPUNCTURE: CPT

## 2018-10-11 RX ORDER — HYDROCODONE BITARTRATE AND ACETAMINOPHEN 10; 325 MG/1; MG/1
1 TABLET ORAL EVERY 6 HOURS PRN
Qty: 120 TABLET | Refills: 0 | Status: SHIPPED | OUTPATIENT
Start: 2018-11-10 | End: 2019-02-13 | Stop reason: SDUPTHER

## 2018-10-11 RX ORDER — COLCHICINE 0.6 MG/1
TABLET ORAL
Qty: 45 TABLET | Refills: 3 | Status: SHIPPED | OUTPATIENT
Start: 2018-10-11 | End: 2018-11-29

## 2018-10-11 RX ORDER — PREGABALIN 100 MG/1
CAPSULE ORAL
Qty: 180 CAPSULE | Refills: 1 | Status: SHIPPED | OUTPATIENT
Start: 2018-10-11 | End: 2019-02-13 | Stop reason: SDUPTHER

## 2018-10-11 RX ORDER — ALLOPURINOL 100 MG/1
300 TABLET ORAL DAILY
Qty: 90 TABLET | Refills: 3 | Status: SHIPPED | OUTPATIENT
Start: 2018-10-11 | End: 2018-10-11

## 2018-10-11 RX ORDER — HYDROCODONE BITARTRATE AND ACETAMINOPHEN 10; 325 MG/1; MG/1
1 TABLET ORAL EVERY 6 HOURS PRN
Qty: 120 TABLET | Refills: 0 | Status: SHIPPED | OUTPATIENT
Start: 2018-10-11 | End: 2018-10-11 | Stop reason: SDUPTHER

## 2018-10-11 RX ORDER — ALLOPURINOL 100 MG/1
300 TABLET ORAL DAILY
Qty: 270 TABLET | Refills: 3 | Status: SHIPPED | OUTPATIENT
Start: 2018-10-11 | End: 2018-11-29

## 2018-10-11 RX ORDER — DULOXETIN HYDROCHLORIDE 30 MG/1
30 CAPSULE, DELAYED RELEASE ORAL 2 TIMES DAILY
Qty: 180 CAPSULE | Refills: 3 | Status: SHIPPED | OUTPATIENT
Start: 2018-10-11 | End: 2019-01-09

## 2018-10-11 RX ORDER — HYDROCODONE BITARTRATE AND ACETAMINOPHEN 10; 325 MG/1; MG/1
1 TABLET ORAL EVERY 6 HOURS PRN
Qty: 120 TABLET | Refills: 0 | Status: SHIPPED | OUTPATIENT
Start: 2018-12-11 | End: 2018-10-11 | Stop reason: SDUPTHER

## 2018-10-11 ASSESSMENT — ROUTINE ASSESSMENT OF PATIENT INDEX DATA (RAPID3)
AM STIFFNESS SCORE: 1, YES
PATIENT GLOBAL ASSESSMENT SCORE: 0
PAIN SCORE: 0
WHEN YOU AWAKENED IN THE MORNING OVER THE LAST WEEK, PLEASE INDICATE THE AMOUNT OF TIME IT TAKES UNTIL YOU ARE AS LIMBER AS YOU WILL BE FOR THE DAY: 20MINS
TOTAL RAPID3 SCORE: .44
MDHAQ FUNCTION SCORE: .4
FATIGUE SCORE: 2.5

## 2018-10-11 NOTE — PROGRESS NOTES
"Subjective:       Patient ID: Annika Mac is a 67 y.o. female.    Chief Complaint: Back Pain    Elbow Pain   Pertinent negatives include no abdominal pain, arthralgias, chest pain, chills, fatigue, fever, joint swelling, myalgias, numbness, rash or weakness. Headaches:     Arm Pain    Pertinent negatives include no chest pain or numbness.   Back Pain   Associated symptoms include leg pain. Pertinent negatives include no abdominal pain, chest pain, fever, numbness or weakness. Headaches:     Knee Pain    Pertinent negatives include no numbness.   Leg Pain    Pertinent negatives include no numbness.   Patient is 66 y/o female , who returns to clinic for chronic back and leg pain.   V 07/11//18.    Today, main pain is:   #1 back pain, leg pain,   Current back pain is  0,  worst pain is 7/10, the best is 3-4 with meds     #1 back pain, leg pain,   Current back pain is 0,  worst pain are7/10, best is 3-4.  Pain is present at all time, accross lower back in midline of tail bone. Back pain is more dull pain, and leg pain is more shooting pain art the top of thighs, and bellow the knee.   Pain is present daytime, and radiates to both legs on front of legs.  Cannot walk more than 50 ft nor she can stand straight > 5 minutes.   She has to lean forward even with cane or during walking, needs to sit down to rest.  Back pain increases, and leg become weak.   She has diabetic neuropathy in both feet, top and bottom, tingling pain.  Does not have "charilie horses".   Sitting down helps a little bit, and lying down  ( on side, or stomach).   She failed Neurontin 300 mg  for maybe 6 yrs, and takes 3 pills a day, that does not help much.   And , Gabapentin is eventually changed to Lyrica 100 mg that she takes daily (approved by insurance) , and pain in hands is gone. Tolerates Lyrica well.   She has been getting NAYLA since 2011,by Dr. Quinn and Geronimo with NAYLA, with some pain relief.   She had a second B/l L5/S1 TF NAYLA on " 16, and she reported that helped a lot > 80% and lasted for only 2 weeks.   First  B/l L5/S1 TF NAYLA 16, with  > 50% pain improvement.  On LCV, she was given Hydrocodone , and that in combination with Gabapentin, helped greatly, decreases pain to tolerable 2-3.  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   She is using for gait: SC, manual WC, and RW.  Here for follow up, and treatment.     Past Medical History:   Diagnosis Date    Allergy     Anemia 2012    Arthritis     CHF (congestive heart failure)     CKD (chronic kidney disease) stage 3, GFR 30-59 ml/min 2012    Clotting disorder     Colon polyp     Deep vein thrombophlebitis of left leg 2012    Degenerative disc disease     Diabetic peripheral neuropathy associated with type 2 diabetes mellitus 2012    GERD (gastroesophageal reflux disease)     HTN (hypertension) 2012    Hyperlipidemia 2012    Lead-induced gout of ankle or foot     right going on three weeks    Nonproliferative diabetic retinopathy of right eye 2012    Obstructive sleep apnea 2012    Osteoporosis     Proliferative diabetic retinopathy of left eye 2012    Stroke 2003    Type 2 diabetes mellitus with diabetic polyneuropathy 2012    Ulcer        Past Surgical History:   Procedure Laterality Date    CATARACT EXTRACTION W/  INTRAOCULAR LENS IMPLANT Left 3/2002    left     CATARACT EXTRACTION W/  INTRAOCULAR LENS IMPLANT Right     OD dr. olivares     SECTION      COLONOSCOPY N/A 2018    Procedure: COLONOSCOPY;  Surgeon: Faizan Mac MD;  Location: [facility] (2ND FLR);  Service: Endoscopy;  Laterality: N/A;    COLONOSCOPY N/A 2018    Performed by Faizan Mac MD at [facility] (2ND FLR)    CYST REMOVAL  2011    left side of face    ESOPHAGOGASTRODUODENOSCOPY (EGD) N/A 2018    [facility]  ENDO (2ND FLR)    EYE SURGERY Bilateral 2012    eye implants    GANGLION CYST EXCISION  11/13/2007    Right wrist    INSERTION, IOL PROSTHESIS Right 12/19/2012    Performed by [facility] at [facility] OR 1ST FLR    Pan Retinal Photocoagulation Bilateral     Dr. Monterroso (Proliferative Diabetic Retinopathy)    PHACOEMULSIFICATION, CATARACT Right 12/19/2012    Performed by [facility] at [facility] OR 1ST FLR    TOTAL ABDOMINAL HYSTERECTOMY  1982    TOTAL KNEE ARTHROPLASTY  2/2006    left    TRIGGER FINGER RELEASE  9/18/2009       Family History   Problem Relation Age of Onset    Diabetes Mother     Hypertension Mother     Heart disease Father     Diabetes Sister     Thyroid disease Brother     Diabetes Brother     Hypertension Brother     Amblyopia Neg Hx     Blindness Neg Hx     Glaucoma Neg Hx     Macular degeneration Neg Hx     Retinal detachment Neg Hx     Strabismus Neg Hx     Colon cancer Neg Hx     Esophageal cancer Neg Hx        Social History     Socioeconomic History    Marital status:      Spouse name: Heber    Number of children: 3    Years of education: None    Highest education level: None   Social Needs    Financial resource strain: None    Food insecurity - worry: None    Food insecurity - inability: None    Transportation needs - medical: None    Transportation needs - non-medical: None   Occupational History    None   Tobacco Use    Smoking status: Never Smoker    Smokeless tobacco: Never Used   Substance and Sexual Activity    Alcohol use: No    Drug use: No    Sexual activity: Yes     Partners: Male   Other Topics Concern    None   Social History Narrative    , lives with family; 3 children, 2 grandchildren       Current Outpatient Medications   Medication Sig Dispense Refill    allopurinol (ZYLOPRIM) 100 MG tablet       allopurinol (ZYLOPRIM) 100 MG tablet Take 3 tablets (300 mg total) by mouth once daily. 270 tablet 3     "amLODIPine (NORVASC) 5 MG tablet Take 1 tablet (5 mg total) by mouth once daily. 90 tablet 1    aspirin 81 MG Chew Take 1 tablet (81 mg total) by mouth once daily.  0    augmented betamethasone dipropionate (DIPROLENE-AF) 0.05 % ointment AAA bid 45 g 0    BD ULTRA-FINE KIKA PEN NEEDLE 32 gauge x 5/32" Ndle To use 4 times daily 200 each 20    blood-glucose meter kit Use as instructed, true test/result meter 1 each 0    clobetasol (TEMOVATE) 0.05 % cream Apply topically 2 (two) times daily as needed. 30 g 2    colchicine (COLCRYS) 0.6 mg tablet One tab every other day 45 tablet 3    COLCRYS 0.6 mg tablet       diclofenac sodium 1 % Gel Apply 2 g topically 3 (three) times daily. for 10 days 400 g 0    DULoxetine (CYMBALTA) 30 MG capsule TAKE 1 CAPSULE DAILY 90 capsule 1    DULoxetine (CYMBALTA) 30 MG capsule Take 1 capsule (30 mg total) by mouth 2 (two) times daily. 180 capsule 3    famotidine (PEPCID) 20 MG tablet Take 1 tablet (20 mg total) by mouth 2 (two) times daily. 1 tablet 0    ferrous sulfate 325 mg (65 mg iron) Tab tablet Take 1 tablet (325 mg total) by mouth 2 (two) times daily.  0    fexofenadine (ALLEGRA) 180 MG tablet TAKE 1 TABLET BY MOUTH ONCE DAILY 30 tablet 0    furosemide (LASIX) 20 MG tablet Take 1 tablet (20 mg total) by mouth once daily. Take an extra pill if short of breath or leg swelling 120 tablet 3    [START ON 11/10/2018] HYDROcodone-acetaminophen (NORCO)  mg per tablet Take 1 tablet by mouth every 6 (six) hours as needed for Pain. 120 tablet 0    insulin regular hum U-500 conc (HUMULIN R U-500, CONC, KWIKPEN) 500 unit/mL (3 mL) InPn Inject  Units into the skin 3 (three) times daily before meals. Take 115u with breakfast,85u with lunch, and 50u with dinner. (Patient taking differently: Inject  Units into the skin 3 (three) times daily before meals. Take 115u with breakfast,140 with large and 120u with small.  100 unit with small 90 with large. Supper 65 " large 50 small.) 11 Syringe 4    irbesartan (AVAPRO) 300 MG tablet Take 0.5 tablets (150 mg total) by mouth every evening. 90 tablet 3    labetalol (NORMODYNE) 300 MG tablet Take 1 tablet (300 mg total) by mouth 2 (two) times daily. 180 tablet 3    lancets 31 gauge Misc 1 lancet by Misc.(Non-Drug; Combo Route) route 4 (four) times daily. 150 each 6    liraglutide 0.6 mg/0.1 mL, 18 mg/3 mL, subq PNIJ 0.6 mg/0.1 mL (18 mg/3 mL) PnIj Inject 0.6 mg daily into the skin for 2 weeks then increase to 1.2 mg thereafter daily. 3 mL 11    polyethylene glycol (COLYTE) 240-22.72-6.72 -5.84 gram SolR Drink half a bottle:Take 8 oz q 15 minutes until half a bottle is complete, as directed. 4000 mL 0    pregabalin (LYRICA) 100 MG capsule TAKE 1 CAPSULE BY MOUTH TWO TIMES DAILY 180 capsule 1    simvastatin (ZOCOR) 40 MG tablet Take 0.5 tablets (20 mg total) by mouth every evening. 90 tablet 1    spironolactone (ALDACTONE) 25 MG tablet Take 0.5 tablets (12.5 mg total) by mouth once daily. 90 tablet 2    TRUETEST TEST STRIPS Strp 1 strip by Misc.(Non-Drug; Combo Route) route 4 (four) times daily. 400 strip 4     No current facility-administered medications for this visit.        Review of patient's allergies indicates:   Allergen Reactions    Iodinated contrast media - oral and iv dye Rash         Review of Systems   Constitutional: Negative.  Negative for appetite change, chills, fatigue, fever and unexpected weight change.   HENT: Negative.  Negative for drooling, trouble swallowing and voice change.    Eyes: Negative.  Negative for pain and visual disturbance.   Respiratory: Negative.  Negative for shortness of breath and wheezing.    Cardiovascular: Negative.  Negative for chest pain and palpitations.   Gastrointestinal: Negative.  Negative for abdominal distention, abdominal pain, constipation and diarrhea.   Genitourinary: Negative.  Negative for difficulty urinating.   Musculoskeletal: Positive for back pain. Negative  for arthralgias, gait problem, joint swelling, myalgias and neck stiffness.               Skin: Negative.  Negative for color change and rash.   Neurological: Negative.  Negative for dizziness, facial asymmetry, speech difficulty, weakness, light-headedness and numbness. Headaches:     Hematological: Negative for adenopathy.   Psychiatric/Behavioral: Negative.  Negative for behavioral problems, confusion and sleep disturbance. The patient is not nervous/anxious.            Objective:      Physical Exam    GENERAL: The patient is alert, oriented, pleasant.   HEENT; PERRLA  NECK: supple   MUSCULOSKELETAL:   Gait: slow james , on wide basis, using RW.   Cervical spine :  Minimally decreased AROM in cervical spine, flexion to 60, extension to  0, side bending and rotation  to 30-35 degrees without pain.  No paravertebral cervical musculature tenderness    No  tenderness in upper trapezius muscles    Lumbar spine, full range of motion in all planes, flexion to 90 degrees , ext. 0.  Side bending and rotation to 35-40 degrees.   Straight leg raising Negative bilaterally.   Full range of motion in all joints x4 extremities, except in RT knee, that is very painful at pattellar lower pole/border.  Appears high riding patella, hat is maybe  lateray displaced,  Muscle strength 5/5 throughout x4 extremities.   No  joint laxity throughout x4 extremities.   NEUROLOGIC: Cranial nerves II through XII intact.   Deep tendon reflexes is normal, +2 in the upper and lower extremities bilaterally.   Muscle tone is normal.   Sensory is intact to light touch and pinprick throughout x4 extremities.     MRI of Lumbar spine showed:  T12-L1:  There is no focal disc herniation.  No significant spinal canal narrowing.    No significant neural foraminal narrowing.  L1-2:  There is no focal disc herniation.  No significant spinal canal narrowing.    No significant neural foraminal narrowing.  L2-3:  There is no focal disc herniation.  No  significant spinal canal narrowing.    No significant neural foraminal narrowing.  L3-4:  There is a minimal circumferential disk bulge and mild bilateral facet arthropathy which results in no significant spinal canal or neural foraminal narrowing.  L4-5:    There is circumferential disk bulge (eccentric to the left), moderate bilateral hypertrophic facet arthropathy, and ligamentum flavum thickening which results in mild spinal canal narrowing and no significant neural foraminal narrowing.      L5-S1:  There is circumferential disk bulge and severe hypertrophic facet arthropathy   with ligamentum flavum hypertrophy which results in mild spinal canal narrowing, moderate left neural foraminal narrowing, and mild right neural foraminal narrowing.i  Impression:   multilevel degenerative changes of the lumbar spine consisting of circumferential disk bulges, hypertrophic facet arthropathy, and ligamentum flavum hypertrophy   which result in mild spinal canal narrowing at L4-5  and L5-S1 as well as moderate left and mild right neural foraminal narrowing at L5-S1.      Assessment:       1. Spinal stenosis of lumbar region with neurogenic claudication    2. Chronic low back pain with sciatica, sciatica laterality unspecified, unspecified back pain laterality    3. Lumbar facet arthropathy    4. Diabetic autonomic neuropathy associated with type 2 diabetes mellitus    5. Polyneuropathy associated with underlying disease    6. Osteoarthritis of spine with radiculopathy, cervical region    7. History of stroke    8. Gait instability    9. Spinal stenosis of lumbar region, unspecified whether neurogenic claudication present    10. PCO (posterior capsular opacification), bilateral    11. Acute pain of right knee    12. Primary osteoarthritis of right knee      Plan:       Spinal stenosis of lumbar region with neurogenic claudication  -     pregabalin (LYRICA) 100 MG capsule; TAKE 1 CAPSULE BY MOUTH TWO TIMES DAILY  Dispense: 180  capsule; Refill: 1  -     Discontinue: HYDROcodone-acetaminophen (NORCO)  mg per tablet; Take 1 tablet by mouth every 6 (six) hours as needed for Pain.  Dispense: 120 tablet; Refill: 0  -     Discontinue: HYDROcodone-acetaminophen (NORCO)  mg per tablet; Take 1 tablet by mouth every 6 (six) hours as needed for Pain.  Dispense: 120 tablet; Refill: 0  -     HYDROcodone-acetaminophen (NORCO)  mg per tablet; Take 1 tablet by mouth every 6 (six) hours as needed for Pain.  Dispense: 120 tablet; Refill: 0  -     DULoxetine (CYMBALTA) 30 MG capsule; Take 1 capsule (30 mg total) by mouth 2 (two) times daily.  Dispense: 180 capsule; Refill: 3  -     [facility] Drug Screen; Future; Expected date: 10/11/2018    Chronic low back pain with sciatica, sciatica laterality unspecified, unspecified back pain laterality  -     pregabalin (LYRICA) 100 MG capsule; TAKE 1 CAPSULE BY MOUTH TWO TIMES DAILY  Dispense: 180 capsule; Refill: 1  -     Discontinue: HYDROcodone-acetaminophen (NORCO)  mg per tablet; Take 1 tablet by mouth every 6 (six) hours as needed for Pain.  Dispense: 120 tablet; Refill: 0  -     Discontinue: HYDROcodone-acetaminophen (NORCO)  mg per tablet; Take 1 tablet by mouth every 6 (six) hours as needed for Pain.  Dispense: 120 tablet; Refill: 0  -     HYDROcodone-acetaminophen (NORCO)  mg per tablet; Take 1 tablet by mouth every 6 (six) hours as needed for Pain.  Dispense: 120 tablet; Refill: 0  -     DULoxetine (CYMBALTA) 30 MG capsule; Take 1 capsule (30 mg total) by mouth 2 (two) times daily.  Dispense: 180 capsule; Refill: 3  -     [facility] Drug Screen; Future; Expected date: 10/11/2018    Lumbar facet arthropathy  -     pregabalin (LYRICA) 100 MG capsule; TAKE 1 CAPSULE BY MOUTH TWO TIMES DAILY  Dispense: 180 capsule; Refill: 1  -     Discontinue: HYDROcodone-acetaminophen (NORCO)  mg per tablet; Take 1 tablet by mouth every 6 (six) hours as needed for Pain.  Dispense:  120 tablet; Refill: 0  -     Discontinue: HYDROcodone-acetaminophen (NORCO)  mg per tablet; Take 1 tablet by mouth every 6 (six) hours as needed for Pain.  Dispense: 120 tablet; Refill: 0  -     HYDROcodone-acetaminophen (NORCO)  mg per tablet; Take 1 tablet by mouth every 6 (six) hours as needed for Pain.  Dispense: 120 tablet; Refill: 0  -     DULoxetine (CYMBALTA) 30 MG capsule; Take 1 capsule (30 mg total) by mouth 2 (two) times daily.  Dispense: 180 capsule; Refill: 3  -     [facility] Drug Screen; Future; Expected date: 10/11/2018    Diabetic autonomic neuropathy associated with type 2 diabetes mellitus  -     pregabalin (LYRICA) 100 MG capsule; TAKE 1 CAPSULE BY MOUTH TWO TIMES DAILY  Dispense: 180 capsule; Refill: 1  -     Discontinue: HYDROcodone-acetaminophen (NORCO)  mg per tablet; Take 1 tablet by mouth every 6 (six) hours as needed for Pain.  Dispense: 120 tablet; Refill: 0  -     Discontinue: HYDROcodone-acetaminophen (NORCO)  mg per tablet; Take 1 tablet by mouth every 6 (six) hours as needed for Pain.  Dispense: 120 tablet; Refill: 0  -     HYDROcodone-acetaminophen (NORCO)  mg per tablet; Take 1 tablet by mouth every 6 (six) hours as needed for Pain.  Dispense: 120 tablet; Refill: 0  -     DULoxetine (CYMBALTA) 30 MG capsule; Take 1 capsule (30 mg total) by mouth 2 (two) times daily.  Dispense: 180 capsule; Refill: 3  -     [facility] Drug Screen; Future; Expected date: 10/11/2018    Polyneuropathy associated with underlying disease  -     pregabalin (LYRICA) 100 MG capsule; TAKE 1 CAPSULE BY MOUTH TWO TIMES DAILY  Dispense: 180 capsule; Refill: 1  -     Discontinue: HYDROcodone-acetaminophen (NORCO)  mg per tablet; Take 1 tablet by mouth every 6 (six) hours as needed for Pain.  Dispense: 120 tablet; Refill: 0  -     Discontinue: HYDROcodone-acetaminophen (NORCO)  mg per tablet; Take 1 tablet by mouth every 6 (six) hours as needed for Pain.  Dispense: 120  tablet; Refill: 0  -     HYDROcodone-acetaminophen (NORCO)  mg per tablet; Take 1 tablet by mouth every 6 (six) hours as needed for Pain.  Dispense: 120 tablet; Refill: 0  -     DULoxetine (CYMBALTA) 30 MG capsule; Take 1 capsule (30 mg total) by mouth 2 (two) times daily.  Dispense: 180 capsule; Refill: 3  -     [facility] Drug Screen; Future; Expected date: 10/11/2018    Osteoarthritis of spine with radiculopathy, cervical region  -     pregabalin (LYRICA) 100 MG capsule; TAKE 1 CAPSULE BY MOUTH TWO TIMES DAILY  Dispense: 180 capsule; Refill: 1  -     Discontinue: HYDROcodone-acetaminophen (NORCO)  mg per tablet; Take 1 tablet by mouth every 6 (six) hours as needed for Pain.  Dispense: 120 tablet; Refill: 0  -     Discontinue: HYDROcodone-acetaminophen (NORCO)  mg per tablet; Take 1 tablet by mouth every 6 (six) hours as needed for Pain.  Dispense: 120 tablet; Refill: 0  -     HYDROcodone-acetaminophen (NORCO)  mg per tablet; Take 1 tablet by mouth every 6 (six) hours as needed for Pain.  Dispense: 120 tablet; Refill: 0  -     DULoxetine (CYMBALTA) 30 MG capsule; Take 1 capsule (30 mg total) by mouth 2 (two) times daily.  Dispense: 180 capsule; Refill: 3  -     [facility] Drug Screen; Future; Expected date: 10/11/2018    History of stroke  -     pregabalin (LYRICA) 100 MG capsule; TAKE 1 CAPSULE BY MOUTH TWO TIMES DAILY  Dispense: 180 capsule; Refill: 1  -     Discontinue: HYDROcodone-acetaminophen (NORCO)  mg per tablet; Take 1 tablet by mouth every 6 (six) hours as needed for Pain.  Dispense: 120 tablet; Refill: 0  -     Discontinue: HYDROcodone-acetaminophen (NORCO)  mg per tablet; Take 1 tablet by mouth every 6 (six) hours as needed for Pain.  Dispense: 120 tablet; Refill: 0  -     HYDROcodone-acetaminophen (NORCO)  mg per tablet; Take 1 tablet by mouth every 6 (six) hours as needed for Pain.  Dispense: 120 tablet; Refill: 0  -     DULoxetine (CYMBALTA) 30 MG  capsule; Take 1 capsule (30 mg total) by mouth 2 (two) times daily.  Dispense: 180 capsule; Refill: 3  -     [facility] Drug Screen; Future; Expected date: 10/11/2018    Gait instability  -     pregabalin (LYRICA) 100 MG capsule; TAKE 1 CAPSULE BY MOUTH TWO TIMES DAILY  Dispense: 180 capsule; Refill: 1  -     Discontinue: HYDROcodone-acetaminophen (NORCO)  mg per tablet; Take 1 tablet by mouth every 6 (six) hours as needed for Pain.  Dispense: 120 tablet; Refill: 0  -     Discontinue: HYDROcodone-acetaminophen (NORCO)  mg per tablet; Take 1 tablet by mouth every 6 (six) hours as needed for Pain.  Dispense: 120 tablet; Refill: 0  -     HYDROcodone-acetaminophen (NORCO)  mg per tablet; Take 1 tablet by mouth every 6 (six) hours as needed for Pain.  Dispense: 120 tablet; Refill: 0  -     DULoxetine (CYMBALTA) 30 MG capsule; Take 1 capsule (30 mg total) by mouth 2 (two) times daily.  Dispense: 180 capsule; Refill: 3  -     [facility] Drug Screen; Future; Expected date: 10/11/2018    Spinal stenosis of lumbar region, unspecified whether neurogenic claudication present  -     pregabalin (LYRICA) 100 MG capsule; TAKE 1 CAPSULE BY MOUTH TWO TIMES DAILY  Dispense: 180 capsule; Refill: 1  -     Discontinue: HYDROcodone-acetaminophen (NORCO)  mg per tablet; Take 1 tablet by mouth every 6 (six) hours as needed for Pain.  Dispense: 120 tablet; Refill: 0  -     Discontinue: HYDROcodone-acetaminophen (NORCO)  mg per tablet; Take 1 tablet by mouth every 6 (six) hours as needed for Pain.  Dispense: 120 tablet; Refill: 0  -     HYDROcodone-acetaminophen (NORCO)  mg per tablet; Take 1 tablet by mouth every 6 (six) hours as needed for Pain.  Dispense: 120 tablet; Refill: 0  -     DULoxetine (CYMBALTA) 30 MG capsule; Take 1 capsule (30 mg total) by mouth 2 (two) times daily.  Dispense: 180 capsule; Refill: 3  -     [facility] Drug Screen; Future; Expected date: 10/11/2018    PCO (posterior capsular  opacification), bilateral  -     pregabalin (LYRICA) 100 MG capsule; TAKE 1 CAPSULE BY MOUTH TWO TIMES DAILY  Dispense: 180 capsule; Refill: 1  -     Discontinue: HYDROcodone-acetaminophen (NORCO)  mg per tablet; Take 1 tablet by mouth every 6 (six) hours as needed for Pain.  Dispense: 120 tablet; Refill: 0  -     Discontinue: HYDROcodone-acetaminophen (NORCO)  mg per tablet; Take 1 tablet by mouth every 6 (six) hours as needed for Pain.  Dispense: 120 tablet; Refill: 0  -     HYDROcodone-acetaminophen (NORCO)  mg per tablet; Take 1 tablet by mouth every 6 (six) hours as needed for Pain.  Dispense: 120 tablet; Refill: 0  -     DULoxetine (CYMBALTA) 30 MG capsule; Take 1 capsule (30 mg total) by mouth 2 (two) times daily.  Dispense: 180 capsule; Refill: 3  -     [facility] Drug Screen; Future; Expected date: 10/11/2018    Acute pain of right knee  -     pregabalin (LYRICA) 100 MG capsule; TAKE 1 CAPSULE BY MOUTH TWO TIMES DAILY  Dispense: 180 capsule; Refill: 1  -     Discontinue: HYDROcodone-acetaminophen (NORCO)  mg per tablet; Take 1 tablet by mouth every 6 (six) hours as needed for Pain.  Dispense: 120 tablet; Refill: 0  -     Discontinue: HYDROcodone-acetaminophen (NORCO)  mg per tablet; Take 1 tablet by mouth every 6 (six) hours as needed for Pain.  Dispense: 120 tablet; Refill: 0  -     HYDROcodone-acetaminophen (NORCO)  mg per tablet; Take 1 tablet by mouth every 6 (six) hours as needed for Pain.  Dispense: 120 tablet; Refill: 0  -     DULoxetine (CYMBALTA) 30 MG capsule; Take 1 capsule (30 mg total) by mouth 2 (two) times daily.  Dispense: 180 capsule; Refill: 3  -     [facility] Drug Screen; Future; Expected date: 10/11/2018    Primary osteoarthritis of right knee  -     pregabalin (LYRICA) 100 MG capsule; TAKE 1 CAPSULE BY MOUTH TWO TIMES DAILY  Dispense: 180 capsule; Refill: 1  -     Discontinue: HYDROcodone-acetaminophen (NORCO)  mg per tablet; Take 1 tablet by  mouth every 6 (six) hours as needed for Pain.  Dispense: 120 tablet; Refill: 0  -     Discontinue: HYDROcodone-acetaminophen (NORCO)  mg per tablet; Take 1 tablet by mouth every 6 (six) hours as needed for Pain.  Dispense: 120 tablet; Refill: 0  -     HYDROcodone-acetaminophen (NORCO)  mg per tablet; Take 1 tablet by mouth every 6 (six) hours as needed for Pain.  Dispense: 120 tablet; Refill: 0  -     DULoxetine (CYMBALTA) 30 MG capsule; Take 1 capsule (30 mg total) by mouth 2 (two) times daily.  Dispense: 180 capsule; Refill: 3  -     [facility] Drug Screen; Future; Expected date: 10/11/2018      Patient with chronic low back pain, secondary to lumbar spondylosis, possible radiculopathy, cx with morbid obesity.   Also with gait instability secondary to severe DJD of Rt knee.  Now, with a new acute Left elbow pain.       1. Pain management.  Will resume  Hydrocodone to 10/325 mg, and Gabapentin is changed to Lyrica, that is helping,  Taking Lyrica 100 mg bid.  ( approved by insurance, and pain in hands is gone),   Increased  Cymbalta to 30 mg bid.  Opioid Risk Score     None         reviewed and appropriate.  Hydrocodone refills on 07/19, 2/11/18.   Lyrica 100 mg bid, refills (#180 tabs/3 mo), refills on 7/19, 5/10/18.  UDS ordered.    2. Rt knee pain , Xray of knee showed severe DJD.  Knee brace for stability,and proprioception.  Seen by Betsey Paige PA ,and dr. [facility], who recommended no surgery in this high risk pt, weight loss in recommended before re evaluation.  She is s/p steroid Rt knee injection with pain improvement for 4 days, only.   Now trying to loose weight, that she can get TKA.    RTC in 3-4 months.     Total time spent face to face with patient was 25 minutes.   More than 50% of that time was spent in counseling on diagnosis , prognosis and treatment options.   I also caunsel patient  on common and most usual side effect of prescribed medications.   Risk and benefits of opiates,  possible risk of developing opiate dependence and tolerance, need of strict compliance with prescribed medications.  I reviewed Primary care , and other specialty's notes to better coordinate patient's  care.   All questions were answered, and patient voiced understanding.                                                                                                                                                                          .

## 2018-10-11 NOTE — PROGRESS NOTES
Subjective:       Patient ID: Annika Mac is a 67 y.o. female.    Chief Complaint: No chief complaint on file.    Anemia        Mrs. Mac presents today for followup for her longstanding anemia. Briefly, she is a morbidly obese 64-year-old -American female with longstanding anemia, whom I have followed expectantly for at least the last six years. She is here today for her scheduled followup appointment.   Her CBC from earlier today shows a WBC count is 5,300 /mm3, Hg 9.8 gr/dl, Ht 32.1 %, MCV 88 and platelet count of 226,000 /mm3.   Her most recent creatinine from 9/12/2018 was 1.5 mg/dl.  Three stool samples that she submitted today were negative for occult blood.    Review of Systems  Overall she feels OK. She again complains of her long standing arthritis, and is having difficulty ambulating.  She denies any anxiety, depression, easy bruising, fevers, chills, night sweats, weight loss, nausea, vomiting, diarrhea, diplopia, blurred vision, epistaxi,s hematuria, or headaches.      Objective:      Physical Exam  GENERAL: She is alert, oriented to time, place, person, pleasant, well nourished, in no acute physical distress.   VITAL SIGNS: Reviewed.   HEENT: Normal. There are no nasal, oral, lip, gingival, auricular, lid, conjunctival lesions. Pupils are equal, reactive to light and   accommodation and extraocular muscle movements are intact. Mucosae are moist and pink, and there is no thrush.   NECK: Supple without JVD, adenopathy, or thyromegaly.   LUNGS: Clear to auscultation without wheezing, rales, or rhonchi.   CARDIOVASCULAR: Reveals an S1, S2, no murmurs, no rubs, no gallops.   ABDOMEN: Obese, soft, nontender, without organomegaly. Bowel sounds are present. A median abdominal scar is seen extending from the umbilicus to the symphysis pubis.   EXTREMITIES: No cyanosis or clubbing. There is 1(+) edema at the ankle level bilaterally. She does have a walking boot on the right.  SKIN: Does not have  petechiae, rashes, induration, or ecchymoses.   NEUROLOGIC: Motor function is 5/5, DTRs are 0 to 1+ bilaterally, symmetrical, and cranial nerves within normal limits.   LYMPHATIC: There is no cervical, axillary, or supraclavicular adenopathy.   Assessment:       1. Anemia, chronic, stable      2.    Morbid obesity  3.      Diabetes  Plan:         I have asked her to return to see me in 6 months, and she will submit three stool samples at that time.  Given the chronicity of her anemia, I am not inclined to work her up further and perform a bone marrow biopsy unless her anemia gets worse.  Her questions were answered to her satisfaction.

## 2018-10-11 NOTE — TELEPHONE ENCOUNTER
Results given to patient per Dr. Vega. Patient verbalized understanding.     Mrs. Mac did not see the capsule in her stool. Message routed to Dr. Vega.

## 2018-10-11 NOTE — TELEPHONE ENCOUNTER
Spoke with patient, patient is not able to have x-ray done until 10/15. X-ray scheduled per patient preference, she was advised if she have any abdominal pain or discomfort before 10/15, please contact our office for recommendations.     r

## 2018-10-11 NOTE — PROGRESS NOTES
Chief Complaint   Patient presents with    Disease Management     management of gout     Patient with chronic gout for a follow up    History of presenting illness    67 year old black female comes with long standing gout since April 2017    Episode : right big toe and ankle  : redness,swelling and pain : couldn't walk,couldnt have a sheet touch her  She had fluid analysis : gout crystals  They gave her colcrys   Symptoms went away    Last I saw her in July she had left elbow effusion/gouty flare    Vianca xrays with spurring  Knee xrays with OA ON THE RIGHT  Left one replaced  LS spine OA  Hand and wrist xrays     Has had uric acids of > 10  2 days ago it was 7.2  .1  JOSE MIGUEL negative  ANCA negative    On allopurinol 100 mg since April 2017  We increased the dose to 200 mg  She has had uric acids 6.2/6.9 since  Her GFR stays stable 33 to 41  She has anemia     Past history : DM,RUFINA,prior stroke,PAD,LS spine OA,knee OA,htn,hld    Family history : no family h/o gout    Social history : smoked in her high school years  Used to drink,not anymore    Review of Systems   Constitutional: Negative for activity change, appetite change, chills, diaphoresis, fatigue, fever and unexpected weight change.   HENT: Negative for congestion, dental problem, drooling, ear discharge, ear pain, facial swelling, hearing loss, mouth sores, nosebleeds, postnasal drip, rhinorrhea, sinus pressure, sinus pain, sneezing, sore throat, tinnitus, trouble swallowing and voice change.    Eyes: Negative for photophobia, pain, discharge, redness, itching and visual disturbance.   Respiratory: Negative for apnea, cough, choking, chest tightness, shortness of breath, wheezing and stridor.    Cardiovascular: Negative for chest pain, palpitations and leg swelling.   Gastrointestinal: Negative for abdominal distention, abdominal pain, anal bleeding, blood in stool, constipation, diarrhea, nausea, rectal pain and vomiting.   Endocrine: Negative for cold  intolerance, heat intolerance, polydipsia, polyphagia and polyuria.   Genitourinary: Negative for decreased urine volume, difficulty urinating, dysuria, enuresis, flank pain, frequency, genital sores, hematuria and urgency.   Musculoskeletal: Positive for arthralgias. Negative for back pain, gait problem, joint swelling, myalgias, neck pain and neck stiffness.   Skin: Negative for color change, pallor, rash and wound.   Allergic/Immunologic: Negative for environmental allergies, food allergies and immunocompromised state.   Neurological: Negative for dizziness, tremors, seizures, syncope, facial asymmetry, speech difficulty, weakness, light-headedness, numbness and headaches.   Hematological: Negative for adenopathy. Does not bruise/bleed easily.   Psychiatric/Behavioral: Negative for agitation, behavioral problems, confusion, decreased concentration, dysphoric mood, hallucinations, self-injury, sleep disturbance and suicidal ideas. The patient is not nervous/anxious and is not hyperactive.      Physical Exam     LINDSAY-28 tender joint count: 0  LINDSAY-28 swollen joint count: 0    Physical Exam   Constitutional: She is oriented to person, place, and time and well-developed, well-nourished, and in no distress. No distress.   HENT:   Head: Normocephalic.   Mouth/Throat: Oropharynx is clear and moist.   Eyes: Conjunctivae are normal. Pupils are equal, round, and reactive to light. Right eye exhibits no discharge. Left eye exhibits no discharge. No scleral icterus.   Neck: Normal range of motion. No thyromegaly present.   Cardiovascular: Normal rate, regular rhythm, normal heart sounds and intact distal pulses.    Pulmonary/Chest: Effort normal and breath sounds normal. No stridor.   Abdominal: Soft. Bowel sounds are normal.   Lymphadenopathy:     She has no cervical adenopathy.   Neurological: She is alert and oriented to person, place, and time.   Skin: Skin is warm. No rash noted. She is not diaphoretic.     Psychiatric:  Affect and judgment normal.   Musculoskeletal: Normal range of motion.           Assessment     67 year old black female with DM,RUFINA,prior stroke,PAD,LS spine OA,knee OA,htn,hld  She comes in with one episode of big toe and ankle pain and swelling on the right foot a year ago which resolved with colcrys and another episode of acute onset pain and swelling of the left elbow  She has had a fluid analysis once in the past which has revealed gout crystals she mentions  This is not available at [facility]  She has had hyperuricemia and she has been on allopurinol 100 mg since April 2017  Uric acids have trended down from 10 + to 7.2 while on allopurinol  We saw her during her left elbow flare,this responded to prednisone  She has no complaints today  Her uric acids have ranged between 6.2 to 6.9 recently    No recent gout flares  She takes colchicine 0.6 mg daily    She has anemia and CKD  They have not worsened with our treatment     1. Idiopathic gout of multiple sites, unspecified chronicity        Reviewed labs/xrays  Reviewed medications    F/u problem    ULT : allopurinol should never be stopped  Increase allopurinol to 300 mg  If well tolerated stay on the dose,in 6 weeks rpt uric acid  Will follow CBC,CMP periodically  Cautious use of antibiotics like amoxicillin and ampicillin advised  If GI upset will split the dose of allopurinol    Colchicine 0.6 mg alternate day suggested : for acute attack prophylaxis   Side effects : Gi upset,bone marrow suppression,cardiac toxicity,arrythmias,myotoxicity  No renal insufficiency  No liver disease  Not on cyclosporine,tacrolimus,clarithromycin,erythromycin,verapamil,diltiazem,ketoconazole  Need to assess CBC,CMP discussed  Will stop colchicine 3 months after the target uric acid of less than 6 is achieved,and he has no flares    Dietary modifications discussed : gave a hand out of all foods to avoid : avoid organ meat,red meat,seafood,shell fish,anchovies,sardines,alcohol  particularly beer,fructose containing soft drinks  Its ok to consume moderate wine,diet drinks,dairy proteins,coffee  Eat cherries  Eat purine rich vegetables    Labs in 6 weeks,discuss results on the phone  rtc after that      Annika was seen today for disease management.    Diagnoses and all orders for this visit:    Idiopathic gout of multiple sites, unspecified chronicity  -     CBC auto differential; Future  -     Comprehensive metabolic panel; Future  -     Uric acid; Future    Other orders  -     Discontinue: allopurinol (ZYLOPRIM) 100 MG tablet; Take 3 tablets (300 mg total) by mouth once daily.  -     allopurinol (ZYLOPRIM) 100 MG tablet; Take 3 tablets (300 mg total) by mouth once daily.  -     colchicine (COLCRYS) 0.6 mg tablet; One tab every other day

## 2018-10-15 ENCOUNTER — HOSPITAL ENCOUNTER (OUTPATIENT)
Dept: RADIOLOGY | Facility: HOSPITAL | Age: 68
Discharge: HOME | End: 2018-10-15
Attending: INTERNAL MEDICINE
Payer: MEDICARE

## 2018-10-15 DIAGNOSIS — T18.9XXA: ICD-10-CM

## 2018-10-15 PROCEDURE — 74018 RADEX ABDOMEN 1 VIEW: CPT | Mod: 26,,, | Performed by: RADIOLOGY

## 2018-10-15 PROCEDURE — 74018 RADEX ABDOMEN 1 VIEW: CPT | Mod: TC,FY,PO

## 2018-10-17 ENCOUNTER — TELEPHONE (OUTPATIENT)
Dept: GASTROENTEROLOGY | Facility: CLINIC | Age: 68
End: 2018-10-17

## 2018-10-17 NOTE — TELEPHONE ENCOUNTER
----- Message from Ni Santana sent at 10/17/2018  9:52 AM CDT -----  Contact: self  [phone]  Patient Returning Call from [facility]    Who Left Message for Patient: Oumou  Communication Preference: self [phone]  Additional Information:

## 2018-10-17 NOTE — TELEPHONE ENCOUNTER
Attempted to contact patient with test results, but she did not answer. Left voicemail for patient to return call.

## 2018-10-17 NOTE — TELEPHONE ENCOUNTER
----- Message from Dangelo Vega MD sent at 10/15/2018  1:21 PM CDT -----  There are no signs of a retained pill camera.  The pill camera has passed and is no longer present.    MA to call patient with results.

## 2018-10-25 ENCOUNTER — HOSPITAL ENCOUNTER (OUTPATIENT)
Dept: RADIOLOGY | Facility: HOSPITAL | Age: 68
Discharge: HOME | End: 2018-10-25
Attending: INTERNAL MEDICINE
Payer: MEDICARE

## 2018-10-25 DIAGNOSIS — Z12.31 ENCOUNTER FOR SCREENING MAMMOGRAM FOR BREAST CANCER: ICD-10-CM

## 2018-10-25 PROCEDURE — 77067 SCR MAMMO BI INCL CAD: CPT | Mod: 26,,, | Performed by: RADIOLOGY

## 2018-10-25 PROCEDURE — 77067 SCR MAMMO BI INCL CAD: CPT | Mod: TC

## 2018-10-30 ENCOUNTER — CLINICAL SUPPORT (OUTPATIENT)
Dept: DIABETES | Facility: CLINIC | Age: 68
End: 2018-10-30
Payer: MEDICARE

## 2018-10-30 ENCOUNTER — OFFICE VISIT (OUTPATIENT)
Dept: OPHTHALMOLOGY | Facility: CLINIC | Age: 68
End: 2018-10-30
Payer: MEDICARE

## 2018-10-30 DIAGNOSIS — E11.42 TYPE 2 DIABETES MELLITUS WITH DIABETIC POLYNEUROPATHY, WITH LONG-TERM CURRENT USE OF INSULIN: ICD-10-CM

## 2018-10-30 DIAGNOSIS — Z79.4 TYPE 2 DIABETES MELLITUS WITH DIABETIC POLYNEUROPATHY, WITH LONG-TERM CURRENT USE OF INSULIN: ICD-10-CM

## 2018-10-30 DIAGNOSIS — H35.373 EPIRETINAL MEMBRANE (ERM) OF BOTH EYES: ICD-10-CM

## 2018-10-30 PROCEDURE — 99999 PR PBB SHADOW E&M-EST. PATIENT-LVL I: CPT | Mod: PBBFAC,,, | Performed by: DIETITIAN, REGISTERED

## 2018-10-30 PROCEDURE — 99999 PR PBB SHADOW E&M-EST. PATIENT-LVL III: CPT | Mod: PBBFAC,,, | Performed by: OPHTHALMOLOGY

## 2018-10-30 PROCEDURE — 92226 PR SPECIAL EYE EXAM, SUBSEQUENT: CPT | Mod: 50,PBBFAC | Performed by: OPHTHALMOLOGY

## 2018-10-30 PROCEDURE — 99213 OFFICE O/P EST LOW 20 MIN: CPT | Mod: PBBFAC,25 | Performed by: OPHTHALMOLOGY

## 2018-10-30 PROCEDURE — 99211 OFF/OP EST MAY X REQ PHY/QHP: CPT | Mod: PBBFAC,27 | Performed by: DIETITIAN, REGISTERED

## 2018-10-30 PROCEDURE — 92226 PR SPECIAL EYE EXAM, SUBSEQUENT: CPT | Mod: 50,S$PBB,, | Performed by: OPHTHALMOLOGY

## 2018-10-30 PROCEDURE — G0108 DIAB MANAGE TRN  PER INDIV: HCPCS | Mod: PBBFAC | Performed by: DIETITIAN, REGISTERED

## 2018-10-30 PROCEDURE — 92014 COMPRE OPH EXAM EST PT 1/>: CPT | Mod: S$PBB,,, | Performed by: OPHTHALMOLOGY

## 2018-10-30 NOTE — PROGRESS NOTES
Diabetes Education  Author: Flori Miller RD  Date: 10/30/2018    Diabetes Care Management Summary  Diabetes Education Record Assessment/Progress: Initial  Current Diabetes Risk Level: Moderate     Last A1c:   Lab Results   Component Value Date    HGBA1C 9.6 (H) 06/08/2018     Last visit with Diabetes Educator: : 10/30/2018      Diabetes Type  Diabetes Type : Type II    Diabetes History  Current Treatment: Insulin, Injectable(U500 120 small or 140 large breakfast, 80 small or 90 large lunch, 65 small or 75 large dinner, Victoza 1.2mg daily)  Reviewed Problem List with Patient: Yes    Health Maintenance was reviewed today with patient. Discussed with patient importance of routine eye exams, foot exams/foot care, blood work (i.e.: A1c, microalbumin, and lipid), dental visits, yearly flu vaccine, and pneumonia vaccine as indicated by PCP. Patient verbalized understanding.     Health Maintenance Topics with due status: Not Due       Topic Last Completion Date    DEXA SCAN 06/09/2017    Mammogram 10/24/2017    Colonoscopy 02/26/2018    Lipid Panel 04/23/2018    Foot Exam 05/18/2018    Hemoglobin A1c 06/08/2018    TETANUS VACCINE 08/24/2018    High Dose Statin 10/30/2018     Health Maintenance Due   Topic Date Due    Zoster Vaccine  12/08/2009    Influenza Vaccine  08/01/2018    Eye Exam  10/24/2018       Nutrition  Meal Planning: 3 meals per day, eats out seldom  What type of beverages do you drink?: water, diet soda/tea  Meal Plan 24 Hour Recall - Breakfast: 8am - 1/2 cup oatmeal, 1/2 slice toast, 1/2 cup milk  Meal Plan 24 Hour Recall - Lunch: 1pm - 1/2 cup rice, 1/2 cup beans, drumstick of chicken, 1/2 cup salad, water/sf coolaid  Meal Plan 24 Hour Recall - Dinner: 6pm - spaghetti 1/2 cup, meat sauce, 2 in [place] bread, green salad, water  Meal Plan 24 Hour Recall - Snack: HS - 1/2 cup milk and 2 sheets george cracker    Monitoring   Self Monitoring : see attached log - no consistent pattern with readings, 2  hypos attributed to eating less  Blood Glucose Logs: Yes              Do you use a personal continuous glucose monitor?: No  In the last month, how often have you had a low blood sugar reaction?: (twice)  What are your symptoms of low blood sugar?: sweaty, shaky, wakes up in the night  How do you treat low blood sugar?: juice  Can you tell when your blood sugar is too high?: no    Exercise   Exercise Type: (limited d/t back injury)    Current Diabetes Treatment   Current Treatment: Insulin, Injectable(U500 120 small or 140 large breakfast, 80 small or 90 large lunch, 65 small or 75 large dinner, Victoza 1.2mg daily)    Social History  Preferred Learning Method: Face to Face, Reading Materials  Primary Support: Self    PHQ-2 Total Score: 0       DDS-2 Score  ( > 3 = SIGNIFICANT DISTRESS): 2                   Barriers to Change  Barriers to Change: Functional limitation(h/o stroke, speach effected and some weakness, also has back pain)  Learning Challenges : None    Readiness to Learn   Readiness to Learn : Acceptance    Cultural Influences  Cultural Influences: No    Diabetes Education Assessment/Progress    Diabetes Disease Process (diabetes disease process and treatment options): Discussion, Individual Session, Demonstrates Understanding/Competency(verbalizes/demonstrates)    Nutrition (Incorporating nutritional management into one's lifestyle): Discussion, Individual Session, Demonstrates Understanding/Competency (verbalizes/demonstrates)(Visited with me over a year ago. Has been very mindful of sources of CHO and portion sizes. Reports using measuring cups at home. She also spaces meals about 5 hours apart. Praised efforts. Snacks a bit HS d/t concern of dropping low at night. Discussed reduce or limit snacking HS unless BG is <100 before going to bed.)    Physical Activity (incorporating physical activity into one's lifestyle): Discussion, Individual Session(Very limited activity d/t back pain and weakness.  "Discussed low impact activity - water aerobics or seated/chair exercises. She verbalized has been trying to incorporate chair exercises. Encouraged at least 10 min daily and increasing as able.)    Medications (states correct name, dose, onset, peak, duration, side effects & timing of meds): Discussion, Individual Session, Comprehends Key Points, Instructed, Written Materials Provided(Reviewed BG log with Ursula Salinas NP - she advised to increase U500 by 10 units at breakfast and lunch and keeping dinner the same. She also agreed with increasing Victoza to 1.8mg daily since pt denies s/e and is trying to lose wt. Reviewed changes with pt. She has been taking U500 about 15 min before meals - explained action and encouraged timing 30 min before meals. Observed injection sites and look good (no scaring, discoloration or lipohypertrophy observed). She rotates appropriately. Also discussed with Victoza ensure only doing air shot (priming) with 1st time use of pen to prevent running out early given she is now increasing to max dose.)    Monitoring (monitoring blood glucose/other parameters & using results): Discussion, Individual Session, Comprehends Key Points, Written Materials Provided, Instructed(Reviewed goal BG readings, SMBG 4 times daily AC/HS. Discussed option of FreeStyle Chery to better identify trends. She verbalized will think about it but not sure she would like wearing a device.)    Acute Complications (preventing, detecting, and treating acute complications): Discussion, Individual Session, Demonstrates Understanding/Competency (verbalizes/demonstrates), Instructed, Written Materials Provided(Asked if she keeps hypo treatment with her - she usually goes to kitchen to get juice. Encouraged keeping glucose tabs or other simple glucose source with her at all times and at bedside in case she is unable to walk to kitchen. Reviewed "rule of 15.")    Chronic Complications (preventing, detecting, and treating " chronic complications): Discussion, Individual Session    Clinical (diabetes, other pertinent medical history, and relevant comorbidities reviewed during visit): Discussion, Individual Session    Cognitive (knowledge of self-management skills, functional health literacy): Discussion, Individual Session    Psychosocial (emotional response to diabetes): Discussion, Individual Session    Diabetes Distress and Support Systems: Discussion, Individual Session    Behavioral (readiness for change, lifestyle practices, self-care behaviors): Discussion, Individual Session    Goals  Patient has selected/evaluated goals during today's session: Yes, selected  Medications: Set(Will take U500 30 min before eating meals and increase DM meds as discussed.)  Start Date: 10/30/18  Target Date: 01/30/19         Diabetes Care Plan/Intervention  Education Plan/Intervention: Individual Follow-Up DSMT         Today's Self-Management Care Plan was developed with the patient's input and is based on barriers identified during today's assessment.    The long and short-term goals in the care plan were written with the patient/caregiver's input. The patient has agreed to work toward these goals to improve her overall diabetes control.      The patient received a copy of today's self-management plan and verbalized understanding of the care plan, goals, and all of today's instructions.      The patient was encouraged to communicate with her physician and care team regarding her condition(s) and treatment.  I provided the patient with my contact information today and encouraged her to contact me via phone or patient portal as needed.     Education Units of Time   Time Spent: 60 min

## 2018-10-31 NOTE — TELEPHONE ENCOUNTER
----- Message from Leah Bailey sent at 10/31/2018  3:17 PM CDT -----  Contact: pt  Pt would like to be called back regarding an appt for 12/03/2018  Pt can be reached at [phone]

## 2018-11-15 ENCOUNTER — LAB VISIT (OUTPATIENT)
Dept: LAB | Facility: HOSPITAL | Age: 68
End: 2018-11-15
Attending: INTERNAL MEDICINE
Payer: MEDICARE

## 2018-11-15 DIAGNOSIS — M10.09 IDIOPATHIC GOUT OF MULTIPLE SITES, UNSPECIFIED CHRONICITY: ICD-10-CM

## 2018-11-15 LAB
ALBUMIN SERPL BCP-MCNC: 3.7 G/DL
ALP SERPL-CCNC: 104 U/L
ALT SERPL W/O P-5'-P-CCNC: 18 U/L
ANION GAP SERPL CALC-SCNC: 6 MMOL/L
AST SERPL-CCNC: 30 U/L
BASOPHILS # BLD AUTO: 0.04 K/UL
BASOPHILS NFR BLD: 0.7 %
BILIRUB SERPL-MCNC: 0.2 MG/DL
BUN SERPL-MCNC: 32 MG/DL
CALCIUM SERPL-MCNC: 9.7 MG/DL
CHLORIDE SERPL-SCNC: 107 MMOL/L
CO2 SERPL-SCNC: 26 MMOL/L
CREAT SERPL-MCNC: 2 MG/DL
DIFFERENTIAL METHOD: ABNORMAL
EOSINOPHIL # BLD AUTO: 0.5 K/UL
EOSINOPHIL NFR BLD: 9.1 %
ERYTHROCYTE [DISTWIDTH] IN BLOOD BY AUTOMATED COUNT: 14.6 %
EST. GFR  (AFRICAN AMERICAN): 28.9 ML/MIN/1.73 M^2
EST. GFR  (NON AFRICAN AMERICAN): 25.1 ML/MIN/1.73 M^2
GLUCOSE SERPL-MCNC: 165 MG/DL
HCT VFR BLD AUTO: 33.4 %
HGB BLD-MCNC: 10.2 G/DL
IMM GRANULOCYTES # BLD AUTO: 0.02 K/UL
IMM GRANULOCYTES NFR BLD AUTO: 0.4 %
LYMPHOCYTES # BLD AUTO: 1.7 K/UL
LYMPHOCYTES NFR BLD: 32 %
MCH RBC QN AUTO: 28.7 PG
MCHC RBC AUTO-ENTMCNC: 30.5 G/DL
MCV RBC AUTO: 94 FL
MONOCYTES # BLD AUTO: 0.7 K/UL
MONOCYTES NFR BLD: 12.4 %
NEUTROPHILS # BLD AUTO: 2.5 K/UL
NEUTROPHILS NFR BLD: 45.4 %
NRBC BLD-RTO: 0 /100 WBC
PLATELET # BLD AUTO: 235 K/UL
PMV BLD AUTO: 11.9 FL
POTASSIUM SERPL-SCNC: 4.6 MMOL/L
PROT SERPL-MCNC: 6.4 G/DL
RBC # BLD AUTO: 3.55 M/UL
SODIUM SERPL-SCNC: 139 MMOL/L
URATE SERPL-MCNC: 7.6 MG/DL
WBC # BLD AUTO: 5.41 K/UL

## 2018-11-15 PROCEDURE — 80053 COMPREHEN METABOLIC PANEL: CPT

## 2018-11-15 PROCEDURE — 36415 COLL VENOUS BLD VENIPUNCTURE: CPT | Mod: PO

## 2018-11-15 PROCEDURE — 84550 ASSAY OF BLOOD/URIC ACID: CPT

## 2018-11-15 PROCEDURE — 85025 COMPLETE CBC W/AUTO DIFF WBC: CPT

## 2018-11-21 DIAGNOSIS — E78.2 MIXED HYPERLIPIDEMIA: ICD-10-CM

## 2018-11-21 DIAGNOSIS — N18.30 CKD (CHRONIC KIDNEY DISEASE) STAGE 3, GFR 30-59 ML/MIN: Chronic | ICD-10-CM

## 2018-11-21 RX ORDER — AMLODIPINE BESYLATE 5 MG/1
5 TABLET ORAL DAILY
Qty: 90 TABLET | Refills: 0 | Status: SHIPPED | OUTPATIENT
Start: 2018-11-21 | End: 2018-11-23 | Stop reason: SDUPTHER

## 2018-11-23 DIAGNOSIS — E78.2 MIXED HYPERLIPIDEMIA: ICD-10-CM

## 2018-11-23 DIAGNOSIS — N18.30 CKD (CHRONIC KIDNEY DISEASE) STAGE 3, GFR 30-59 ML/MIN: Chronic | ICD-10-CM

## 2018-11-23 RX ORDER — AMLODIPINE BESYLATE 5 MG/1
5 TABLET ORAL DAILY
Qty: 90 TABLET | Refills: 0 | Status: SHIPPED | OUTPATIENT
Start: 2018-11-23 | End: 2018-11-23 | Stop reason: SDUPTHER

## 2018-11-23 RX ORDER — AMLODIPINE BESYLATE 5 MG/1
5 TABLET ORAL DAILY
Qty: 90 TABLET | Refills: 1 | Status: SHIPPED | OUTPATIENT
Start: 2018-11-23 | End: 2019-04-02 | Stop reason: DRUGHIGH

## 2018-11-23 NOTE — TELEPHONE ENCOUNTER
----- Message from Analia Crawford sent at 11/23/2018  2:17 PM CST -----  Contact: Pt  Pt says [facility] keep calling saying they need a new prescription     amLODIPine (NORVASC) 5 MG tablet    Pt can be reached at [phone] thanks

## 2018-11-29 ENCOUNTER — OFFICE VISIT (OUTPATIENT)
Dept: RHEUMATOLOGY | Facility: CLINIC | Age: 68
End: 2018-11-29
Payer: MEDICARE

## 2018-11-29 ENCOUNTER — OFFICE VISIT (OUTPATIENT)
Dept: PODIATRY | Facility: CLINIC | Age: 68
End: 2018-11-29
Payer: MEDICARE

## 2018-11-29 ENCOUNTER — PATIENT OUTREACH (OUTPATIENT)
Dept: DIABETES | Facility: CLINIC | Age: 68
End: 2018-11-29

## 2018-11-29 VITALS
SYSTOLIC BLOOD PRESSURE: 122 MMHG | HEART RATE: 74 BPM | HEIGHT: 65 IN | BODY MASS INDEX: 48.82 KG/M2 | WEIGHT: 293 LBS | DIASTOLIC BLOOD PRESSURE: 53 MMHG

## 2018-11-29 VITALS
DIASTOLIC BLOOD PRESSURE: 66 MMHG | BODY MASS INDEX: 48.82 KG/M2 | SYSTOLIC BLOOD PRESSURE: 124 MMHG | HEART RATE: 64 BPM | WEIGHT: 293 LBS | HEIGHT: 65 IN

## 2018-11-29 DIAGNOSIS — M10.09 IDIOPATHIC GOUT OF MULTIPLE SITES, UNSPECIFIED CHRONICITY: Primary | ICD-10-CM

## 2018-11-29 DIAGNOSIS — B35.1 ONYCHOMYCOSIS DUE TO DERMATOPHYTE: ICD-10-CM

## 2018-11-29 DIAGNOSIS — L84 CORN OR CALLUS: ICD-10-CM

## 2018-11-29 DIAGNOSIS — E11.49 TYPE II DIABETES MELLITUS WITH NEUROLOGICAL MANIFESTATIONS: Primary | ICD-10-CM

## 2018-11-29 PROCEDURE — 11721 DEBRIDE NAIL 6 OR MORE: CPT | Mod: Q9,PBBFAC,59 | Performed by: PODIATRIST

## 2018-11-29 PROCEDURE — 11057 PARNG/CUTG B9 HYPRKR LES >4: CPT | Mod: 59,Q9,PBBFAC | Performed by: PODIATRIST

## 2018-11-29 PROCEDURE — 99213 OFFICE O/P EST LOW 20 MIN: CPT | Mod: PBBFAC | Performed by: PODIATRIST

## 2018-11-29 PROCEDURE — 99999 PR PBB SHADOW E&M-EST. PATIENT-LVL III: CPT | Mod: PBBFAC,,, | Performed by: INTERNAL MEDICINE

## 2018-11-29 PROCEDURE — 11721 DEBRIDE NAIL 6 OR MORE: CPT | Mod: 59,Q9,S$PBB, | Performed by: PODIATRIST

## 2018-11-29 PROCEDURE — 99213 OFFICE O/P EST LOW 20 MIN: CPT | Mod: PBBFAC,27 | Performed by: INTERNAL MEDICINE

## 2018-11-29 PROCEDURE — 11057 PARNG/CUTG B9 HYPRKR LES >4: CPT | Mod: Q9,S$PBB,, | Performed by: PODIATRIST

## 2018-11-29 PROCEDURE — 99214 OFFICE O/P EST MOD 30 MIN: CPT | Mod: S$PBB,,, | Performed by: INTERNAL MEDICINE

## 2018-11-29 PROCEDURE — 99999 PR PBB SHADOW E&M-EST. PATIENT-LVL III: CPT | Mod: PBBFAC,,, | Performed by: PODIATRIST

## 2018-11-29 PROCEDURE — 99499 UNLISTED E&M SERVICE: CPT | Mod: S$PBB,,, | Performed by: PODIATRIST

## 2018-11-29 RX ORDER — ALLOPURINOL 100 MG/1
300 TABLET ORAL DAILY
Qty: 270 TABLET | Refills: 3 | Status: SHIPPED | OUTPATIENT
Start: 2018-11-29 | End: 2019-01-04

## 2018-11-29 NOTE — PATIENT INSTRUCTIONS
Avoid sea food  Avoid evangelina  No more colchicine  Allopurinol 300 mg  Come back in 4 weeks with blood work

## 2018-11-29 NOTE — PROGRESS NOTES
Subjective:      Patient ID: Annika Mac is a 67 y.o. female.    Chief Complaint: Type II diabetes mellitus with neurological manifestations (bilateral pcp Dr Cotton 8/24/18) and Foot Pain    Annika is a 67 y.o. female who presents to the clinic for evaluation and treatment of high risk feet. Annika has a past medical history of Allergy, Anemia (7/27/2012), Arthritis, CHF (congestive heart failure), CKD (chronic kidney disease) stage 3, GFR 30-59 ml/min (7/27/2012), Clotting disorder, Colon polyp, Deep vein thrombophlebitis of left leg (7/27/2012), Degenerative disc disease, Diabetic peripheral neuropathy associated with type 2 diabetes mellitus (7/27/2012), GERD (gastroesophageal reflux disease), HTN (hypertension) (7/27/2012), Hyperlipidemia (7/27/2012), Lead-induced gout of ankle or foot, Nonproliferative diabetic retinopathy of right eye (7/27/2012), Obstructive sleep apnea (7/27/2012), Osteoporosis, Proliferative diabetic retinopathy of left eye (7/27/2012), Stroke (8/1/2003), Type 2 diabetes mellitus with diabetic polyneuropathy (7/27/2012), and Ulcer. The patient's chief complaint is long, thick toenails and calluses on both feet . No other major pedal complaintsThis patient has documented high risk feet requiring routine maintenance secondary to diabetes mellitis and those secondary complications of diabetes, as mentioned..       PCP: Mamie Cotton MD    Date Last Seen by PCP:       Chief Complaint   Patient presents with    Type II diabetes mellitus with neurological manifestations     bilateral pcp Dr Cotton 8/24/18    Foot Pain       Current shoe gear: black leather DM shoes w/ custom inserts     Hemoglobin A1C   Date Value Ref Range Status   06/08/2018 9.6 (H) 4.0 - 5.6 % Final     Comment:     ADA Screening Guidelines:  5.7-6.4%  Consistent with prediabetes  >or=6.5%  Consistent with diabetes  High levels of fetal hemoglobin interfere with the HbA1C  assay. Heterozygous hemoglobin variants (HbS,  HgC, etc)do  not significantly interfere with this assay.   However, presence of multiple variants may affect accuracy.     03/13/2018 9.0 (H) 4.0 - 5.6 % Final     Comment:     According to ADA guidelines, hemoglobin A1c <7.0% represents  optimal control in non-pregnant diabetic patients. Different  metrics may apply to specific patient populations.   Standards of Medical Care in Diabetes-2016.  For the purpose of screening for the presence of diabetes:  <5.7%     Consistent with the absence of diabetes  5.7-6.4%  Consistent with increasing risk for diabetes   (prediabetes)  >or=6.5%  Consistent with diabetes  Currently, no consensus exists for use of hemoglobin A1c  for diagnosis of diabetes for children.  This Hemoglobin A1c assay has significant interference with fetal   hemoglobin   (HbF). The results are invalid for patients with abnormal amounts of   HbF,   including those with known Hereditary Persistence   of Fetal Hemoglobin. Heterozygous hemoglobin variants (HbAS, HbAC,   HbAD, HbAE, HbA2) do not significantly interfere with this assay;   however, presence of multiple variants in a sample may impact the %   interference.     01/12/2018 10.4 (H) 4.0 - 5.6 % Final     Comment:     According to ADA guidelines, hemoglobin A1c <7.0% represents  optimal control in non-pregnant diabetic patients. Different  metrics may apply to specific patient populations.   Standards of Medical Care in Diabetes-2016.  For the purpose of screening for the presence of diabetes:  <5.7%     Consistent with the absence of diabetes  5.7-6.4%  Consistent with increasing risk for diabetes   (prediabetes)  >or=6.5%  Consistent with diabetes  Currently, no consensus exists for use of hemoglobin A1c  for diagnosis of diabetes for children.  This Hemoglobin A1c assay has significant interference with fetal   hemoglobin   (HbF). The results are invalid for patients with abnormal amounts of   HbF,   including those with known Hereditary  Persistence   of Fetal Hemoglobin. Heterozygous hemoglobin variants (HbAS, HbAC,   HbAD, HbAE, HbA2) do not significantly interfere with this assay;   however, presence of multiple variants in a sample may impact the %   interference.         Review of Systems   Constitution: Negative for chills, decreased appetite and fever.   Cardiovascular: Negative for leg swelling.   Skin: Positive for dry skin and nail changes. Negative for color change, flushing, itching and poor wound healing.   Musculoskeletal: Positive for arthritis. Negative for back pain, gout, joint pain, joint swelling and myalgias.   Gastrointestinal: Negative for nausea and vomiting.   Neurological: Positive for numbness. Negative for loss of balance and paresthesias.           Objective:      Physical Exam   Constitutional: She is oriented to person, place, and time. She appears well-developed and well-nourished. No distress.   Cardiovascular:   Dorsalis pedis and posterior tibial pulses are diminished Bilaterally. Toes are cool to touch. Feet are warm proximally.There is decreased digital hair. Skin is atrophic, slightly hyperpigmented, and mildly edematous       Musculoskeletal: Normal range of motion. She exhibits no edema, tenderness or deformity.   Equinus noted b/l ankles, gastroc. Adequate joint range of motion without pain, limitation, nor crepitation Bilateral pedal joints. Muscle strength is 5/5 in all groups bilaterally.        Neurological: She is alert and oriented to person, place, and time.   Ghent-Vincent 5.07 monofilamant testing is diminished [place] feet. Sharp/dull sensation diminished Bilaterally. Light touch absent Bilaterally.       Skin: Skin is warm, dry and intact. No abrasion, no bruising, no burn, no ecchymosis, no laceration, no lesion, no petechiae and no rash noted. She is not diaphoretic. No pallor.   Nails 1-5 b/l  are elongated by  3-5mm's, thickened by 3-5 mm's, dystrophic, and are darkened in  coloration . Xerosis  Bilaterally. No open lesions noted.      Hyperkeratotic tissue noted to distal 2-4 toe tips b/l.   Psychiatric: She has a normal mood and affect. Thought content normal.   Nursing note and vitals reviewed.            Assessment:       Encounter Diagnoses   Name Primary?    Type II diabetes mellitus with neurological manifestations Yes    Corn or callus     Onychomycosis due to dermatophyte          Plan:       Annika was seen today for type ii diabetes mellitus with neurological manifestations and foot pain.    Diagnoses and all orders for this visit:    Type II diabetes mellitus with neurological manifestations    Corn or callus    Onychomycosis due to dermatophyte      I counseled the patient on her conditions, their implications and medical management.        - Shoe inspection. Diabetic Foot Education. Patient reminded of the importance of good nutrition and blood sugar control to help prevent podiatric complications of diabetes. Patient instructed on proper foot hygeine. We discussed wearing proper shoe gear, daily foot inspections, never walking without protective shoe gear, never putting sharp instruments to feet, routine podiatric nail visits every 2-3 months.      - With patient's permission, nails were aggressively reduced and debrided x 10 to their soft tissue attachment mechanically and with electric , removing all offending nail and debris. Patient relates relief following the procedure. She will continue to monitor the areas daily, inspect her feet, wear protective shoe gear when ambulatory, moisturizer to maintain skin integrity and follow in this office in approximately 2-3 months, sooner p.r.n.    - After cleansing the  area w/ alcohol prep pad the above mentioned hyperkeratosis was trimmed utilizing No 15 scapel, to a smooth base with out incident. Patient tolerated this  well and reported comfort to the area of distal 2-4 toe b/l

## 2018-11-29 NOTE — PROGRESS NOTES
Chief Complaint   Patient presents with    Follow-up     gout follow up     Patient with chronic gout for a follow up    History of presenting illness    67 year old black female comes with long standing gout since April 2017    Episode : right big toe and ankle  : redness,swelling and pain : couldn't walk,couldnt have a sheet touch her  She had fluid analysis : gout crystals  They gave her colcrys   Symptoms went away    Last I saw her in July she had left elbow effusion/gouty flare    Vianca xrays with spurring  Knee xrays with OA ON THE RIGHT  Left one replaced  LS spine OA  Hand and wrist xrays     Has had uric acids of > 10  .1  JOSE MIGUEL negative  ANCA negative    On allopurinol 100 mg since April 2017  We increased the dose to 200 mg  She has had uric acids 6.2/6.9 since  Her GFR stays stable 33 to 41  She has anemia     10/2018 :  we increased her allopurinol to 300 mg  Her uric acid went up to 7.6  Her kidney function has stayed low 28.9  Creat shows an uptrend  GFR shows a downtrend  LFTs are ok  CBC stable     Past history : DM,RUFINA,prior stroke,PAD,LS spine OA,knee OA,htn,hld    Family history : no family h/o gout    Social history : smoked in her high school years  Used to drink,not anymore    Review of Systems   Constitutional: Negative for activity change, appetite change, chills, diaphoresis, fatigue, fever and unexpected weight change.   HENT: Negative for congestion, dental problem, drooling, ear discharge, ear pain, facial swelling, hearing loss, mouth sores, nosebleeds, postnasal drip, rhinorrhea, sinus pressure, sinus pain, sneezing, sore throat, tinnitus, trouble swallowing and voice change.    Eyes: Negative for photophobia, pain, discharge, redness, itching and visual disturbance.   Respiratory: Negative for apnea, cough, choking, chest tightness, shortness of breath, wheezing and stridor.    Cardiovascular: Negative for chest pain, palpitations and leg swelling.   Gastrointestinal: Negative  for abdominal distention, abdominal pain, anal bleeding, blood in stool, constipation, diarrhea, nausea, rectal pain and vomiting.   Endocrine: Negative for cold intolerance, heat intolerance, polydipsia, polyphagia and polyuria.   Genitourinary: Negative for decreased urine volume, difficulty urinating, dysuria, enuresis, flank pain, frequency, genital sores, hematuria and urgency.   Musculoskeletal: Positive for arthralgias. Negative for back pain, gait problem, joint swelling, myalgias, neck pain and neck stiffness.   Skin: Negative for color change, pallor, rash and wound.   Allergic/Immunologic: Negative for environmental allergies, food allergies and immunocompromised state.   Neurological: Negative for dizziness, tremors, seizures, syncope, facial asymmetry, speech difficulty, weakness, light-headedness, numbness and headaches.   Hematological: Negative for adenopathy. Does not bruise/bleed easily.   Psychiatric/Behavioral: Negative for agitation, behavioral problems, confusion, decreased concentration, dysphoric mood, hallucinations, self-injury, sleep disturbance and suicidal ideas. The patient is not nervous/anxious and is not hyperactive.      Physical Exam     LINDSAY-28 tender joint count: 0  LINDSAY-28 swollen joint count: 0    Physical Exam   Constitutional: She is oriented to person, place, and time and well-developed, well-nourished, and in no distress. No distress.   HENT:   Head: Normocephalic.   Mouth/Throat: Oropharynx is clear and moist.   Eyes: Conjunctivae are normal. Pupils are equal, round, and reactive to light. Right eye exhibits no discharge. Left eye exhibits no discharge. No scleral icterus.   Neck: Normal range of motion. No thyromegaly present.   Cardiovascular: Normal rate, regular rhythm, normal heart sounds and intact distal pulses.    Pulmonary/Chest: Effort normal and breath sounds normal. No stridor.   Abdominal: Soft. Bowel sounds are normal.   Lymphadenopathy:     She has no cervical  adenopathy.   Neurological: She is alert and oriented to person, place, and time.   Skin: Skin is warm. No rash noted. She is not diaphoretic.     Psychiatric: Affect and judgment normal.   Musculoskeletal: Normal range of motion.           Assessment     67 year old black female with DM,RUFINA,prior stroke,PAD,LS spine OA,knee OA,htn,hld  She comes in with one episode of big toe and ankle pain and swelling on the right foot a year ago which resolved with colcrys and another episode of acute onset pain and swelling of the left elbow  She has had a fluid analysis once in the past which has revealed gout crystals she mentions  This is not available at [facility]    She has had hyperuricemia and she has been on allopurinol 100 mg since April 2017  Uric acids have trended down from 10 + to 7.2 while on allopurinol    We saw her during her left elbow flare,this responded to prednisone    She has no complaints today    Her uric acids have ranged between 6.2 to 6.9 recently    She has recently had increase in uric acid from 6 to 7.6  Gumbo and evangelina intake seems excessive    No recent gout flares  She takes colchicine 0.6 mg alternate daily    Worried about the worsening GFR/creatinine    1. Idiopathic gout of multiple sites, unspecified chronicity        Reviewed labs/xrays  Reviewed medications    F/u problem    Given the kidney situation will wait 4 weeks to titrate allopurinol    Continue 300 mg allopurinol  Stop colchicine    Rpt blood work in 4 weeks     Dietary modifications discussed : gave a hand out of all foods to avoid : avoid organ meat,red meat,seafood,shell fish,anchovies,sardines,alcohol particularly beer,fructose containing soft drinks  Its ok to consume moderate wine,diet drinks,dairy proteins,coffee  Eat cherries  Eat purine rich vegetables    Labs in 4 weeks  rtc after that      Annika was seen today for follow-up.    Diagnoses and all orders for this visit:    Idiopathic gout of multiple sites, unspecified  chronicity  -     CBC auto differential; Future  -     Comprehensive metabolic panel; Future  -     Uric acid; Future    Other orders  -     allopurinol (ZYLOPRIM) 100 MG tablet; Take 3 tablets (300 mg total) by mouth once daily.

## 2018-12-05 RX ORDER — HYDROCORTISONE 2.5% 25 MG/G
CREAM TOPICAL
Qty: 30 G | Refills: 2 | Status: SHIPPED | OUTPATIENT
Start: 2018-12-05 | End: 2019-02-13

## 2018-12-17 ENCOUNTER — OFFICE VISIT (OUTPATIENT)
Dept: OTOLARYNGOLOGY | Facility: CLINIC | Age: 68
End: 2018-12-17
Payer: MEDICARE

## 2018-12-17 VITALS
HEIGHT: 65 IN | DIASTOLIC BLOOD PRESSURE: 63 MMHG | SYSTOLIC BLOOD PRESSURE: 110 MMHG | HEART RATE: 65 BPM | BODY MASS INDEX: 48.82 KG/M2 | TEMPERATURE: 97 F | WEIGHT: 293 LBS

## 2018-12-17 DIAGNOSIS — L29.9 EAR ITCHING: Primary | ICD-10-CM

## 2018-12-17 PROCEDURE — 99214 OFFICE O/P EST MOD 30 MIN: CPT | Mod: PBBFAC | Performed by: PHYSICIAN ASSISTANT

## 2018-12-17 PROCEDURE — 99213 OFFICE O/P EST LOW 20 MIN: CPT | Mod: S$PBB,,, | Performed by: PHYSICIAN ASSISTANT

## 2018-12-17 PROCEDURE — 99999 PR PBB SHADOW E&M-EST. PATIENT-LVL IV: CPT | Mod: PBBFAC,,, | Performed by: PHYSICIAN ASSISTANT

## 2018-12-17 NOTE — PROGRESS NOTES
Subjective:       Patient ID: Annika Mac is a 68 y.o. female.    Chief Complaint: Follow-up (left ear has pain when she inserts her aydee aid with itching. )    Ms. Mac is a very pleasant 66 yo female here to see me today for 6 month follow up of  left ear pain, swelling and drainage. I last saw her on 6/18/18 when I has treated her for OE. Her symptoms have resolved.  She was fitted with hearing aid by [facility] [place] at the end of May and  began having ear ache /pain to left ear shortly after. She has been cleaning her hearing aids as instructed.She still c/o of irritation when she wears her HA.       Review of Systems   Constitutional: Negative for chills, fatigue, fever and unexpected weight change.   HENT: Positive for ear pain (ear itching). Negative for congestion, dental problem, ear discharge, facial swelling, hearing loss, nosebleeds, postnasal drip, rhinorrhea, sinus pressure, sneezing, sore throat, tinnitus, trouble swallowing and voice change.    Eyes: Negative for redness, itching and visual disturbance.   Respiratory: Negative for cough, choking, shortness of breath and wheezing.    Cardiovascular: Negative for chest pain and palpitations.   Gastrointestinal: Negative for abdominal pain.        No reflux.   Musculoskeletal: Negative for gait problem.   Skin: Negative for rash.   Neurological: Negative for dizziness, light-headedness and headaches.       Objective:      Physical Exam   Constitutional: She is oriented to person, place, and time. She appears well-developed and well-nourished. No distress.   HENT:   Head: Normocephalic and atraumatic.   Right Ear: Tympanic membrane, external ear and ear canal normal.   Left Ear: Tympanic membrane, external ear and ear canal normal.   Nose: Nose normal. No mucosal edema, rhinorrhea, nasal deformity or septal deviation. No epistaxis. Right sinus exhibits no maxillary sinus tenderness and no frontal sinus tenderness. Left sinus  exhibits no maxillary sinus tenderness and no frontal sinus tenderness.   Mouth/Throat: Uvula is midline, oropharynx is clear and moist and mucous membranes are normal. Mucous membranes are not pale and not dry. No dental caries. No oropharyngeal exudate or posterior oropharyngeal erythema.   Eyes: Conjunctivae, EOM and lids are normal. Pupils are equal, round, and reactive to light. Right eye exhibits no chemosis. Left eye exhibits no chemosis. Right conjunctiva is not injected. Left conjunctiva is not injected. No scleral icterus. Right eye exhibits normal extraocular motion and no nystagmus. Left eye exhibits normal extraocular motion and no nystagmus.   Neck: Trachea normal and phonation normal. No tracheal tenderness present. No tracheal deviation present. No thyroid mass and no thyromegaly present.   Cardiovascular: Intact distal pulses.   Pulmonary/Chest: Effort normal. No stridor. No respiratory distress.   Abdominal: She exhibits no distension.   Lymphadenopathy:        Head (right side): No submental, no submandibular, no preauricular, no posterior auricular and no occipital adenopathy present.        Head (left side): No submental, no submandibular, no preauricular, no posterior auricular and no occipital adenopathy present.     She has no cervical adenopathy.   Neurological: She is alert and oriented to person, place, and time. No cranial nerve deficit.   Skin: Skin is warm and dry. No rash noted. No erythema.   Psychiatric: She has a normal mood and affect. Her behavior is normal.       Assessment:       1. Ear itching        Plan:       Ear pain/itching: I believe that her symptoms are being caused by her HA. We did discuss using oil based product at night when she is not wearing her HA to keep the canal lubricated and moisturized. She will f/u with her HA provider. RTC as needed.

## 2018-12-19 ENCOUNTER — TELEPHONE (OUTPATIENT)
Dept: INTERNAL MEDICINE | Facility: CLINIC | Age: 68
End: 2018-12-19

## 2018-12-19 NOTE — TELEPHONE ENCOUNTER
----- Message from Yovany Smart sent at 12/19/2018  9:24 AM CST -----  Contact: self/[phone]  Pr is calling stating that she's having shoulder issues. Pt also states that she needs to see the doctor before the year is out. Please advise.        Thanks

## 2018-12-20 ENCOUNTER — OFFICE VISIT (OUTPATIENT)
Dept: ORTHOPEDICS | Facility: CLINIC | Age: 68
End: 2018-12-20
Payer: MEDICARE

## 2018-12-20 ENCOUNTER — OFFICE VISIT (OUTPATIENT)
Dept: INTERNAL MEDICINE | Facility: CLINIC | Age: 68
End: 2018-12-20
Payer: MEDICARE

## 2018-12-20 ENCOUNTER — HOSPITAL ENCOUNTER (OUTPATIENT)
Dept: RADIOLOGY | Facility: HOSPITAL | Age: 68
Discharge: HOME | End: 2018-12-20
Attending: PHYSICIAN ASSISTANT
Payer: MEDICARE

## 2018-12-20 VITALS
BODY MASS INDEX: 50.37 KG/M2 | SYSTOLIC BLOOD PRESSURE: 118 MMHG | HEART RATE: 74 BPM | OXYGEN SATURATION: 97 % | HEIGHT: 65 IN | DIASTOLIC BLOOD PRESSURE: 56 MMHG

## 2018-12-20 VITALS
HEIGHT: 65 IN | WEIGHT: 293 LBS | SYSTOLIC BLOOD PRESSURE: 135 MMHG | DIASTOLIC BLOOD PRESSURE: 74 MMHG | BODY MASS INDEX: 48.82 KG/M2 | HEART RATE: 72 BPM

## 2018-12-20 DIAGNOSIS — G47.33 OBSTRUCTIVE SLEEP APNEA: Chronic | ICD-10-CM

## 2018-12-20 DIAGNOSIS — M25.312 INSTABILITY OF LEFT SHOULDER JOINT: ICD-10-CM

## 2018-12-20 DIAGNOSIS — M25.512 ACUTE PAIN OF LEFT SHOULDER: ICD-10-CM

## 2018-12-20 DIAGNOSIS — M25.512 ACUTE PAIN OF LEFT SHOULDER: Primary | ICD-10-CM

## 2018-12-20 DIAGNOSIS — Z79.4 TYPE 2 DIABETES MELLITUS WITH DIABETIC POLYNEUROPATHY, WITH LONG-TERM CURRENT USE OF INSULIN: ICD-10-CM

## 2018-12-20 DIAGNOSIS — E11.42 TYPE 2 DIABETES MELLITUS WITH DIABETIC POLYNEUROPATHY, WITH LONG-TERM CURRENT USE OF INSULIN: ICD-10-CM

## 2018-12-20 DIAGNOSIS — I10 ESSENTIAL HYPERTENSION: ICD-10-CM

## 2018-12-20 PROCEDURE — 99215 OFFICE O/P EST HI 40 MIN: CPT | Mod: PBBFAC,25,27 | Performed by: PHYSICIAN ASSISTANT

## 2018-12-20 PROCEDURE — 99999 PR PBB SHADOW E&M-EST. PATIENT-LVL V: CPT | Mod: PBBFAC,,, | Performed by: PHYSICIAN ASSISTANT

## 2018-12-20 PROCEDURE — 73030 X-RAY EXAM OF SHOULDER: CPT | Mod: 26,LT,, | Performed by: RADIOLOGY

## 2018-12-20 PROCEDURE — 99213 OFFICE O/P EST LOW 20 MIN: CPT | Mod: S$PBB,,, | Performed by: PHYSICIAN ASSISTANT

## 2018-12-20 PROCEDURE — 73030 X-RAY EXAM OF SHOULDER: CPT | Mod: TC,LT

## 2018-12-20 PROCEDURE — 99215 OFFICE O/P EST HI 40 MIN: CPT | Mod: PBBFAC,25 | Performed by: PHYSICIAN ASSISTANT

## 2018-12-20 PROCEDURE — 99214 OFFICE O/P EST MOD 30 MIN: CPT | Mod: S$PBB,,, | Performed by: PHYSICIAN ASSISTANT

## 2018-12-20 NOTE — PATIENT INSTRUCTIONS
Shoulder Pain with Uncertain Cause  Shoulder pain can have many causes. Pain often comes from the structures that surround the shoulder joint. These are the joint capsule, ligaments, tendons, muscles, and bursa. Pain can also come from cartilage in the joint. Cartilage can become worn out or injured. Its important to know whats causing your pain so the healthcare provider can use the correct treatment. But sometimes its difficult to find the exact cause of shoulder pain. You may need to see a specialist (orthopedist). You may also need special tests such as a CT scan or MRI. The provider may need to use special tools to look inside the joint (arthroscopy).  Shoulder pain can be treated with a sling or a device that keeps your shoulder from moving. You can take an anti-inflammatory medicine such as ibuprofen to ease pain. You may need to do special shoulder exercises. Follow up with a specialist if the pain is severe or doesnt go away after a few weeks.  Home care  Follow these tips when caring for yourself at home:  · If a sling was given to you, leave it in place for the time advised by your healthcare provider. If you arent sure how long to wear it, ask for advice. If the sling becomes loose, adjust it so that your forearm is level with the ground. Your shoulder should feel well supported.  · Put an ice pack on the injured area for 20 minutes every 1 to 2 hours the first day. You can make your own ice pack by putting ice cubes in a plastic bag. Wrap the bag in a thin towel. Continue with ice packs 3 to 4 times a day for the next 2 days. Then use the pack as needed to ease pain and swelling.  · You may use acetaminophen or ibuprofen to control pain, unless another pain medicine was prescribed. If you have chronic liver or kidney disease, talk with your healthcare provider before using these medicines. Also talk with your provider if youve ever had a stomach ulcer or GI bleeding.  · Shoulder pain may seem  worse at night, when there is less to distract you from the pain. If you sleep on your side, try to keep weight off your painful shoulder. Propping pillows behind you may stop you from rolling over onto that shoulder during sleep.   · Shoulder and elbow joints can become stiff if left in a sling for too long. You should start range of motion exercises about 7 to 10 days after the injury. Talk with your provider to find out what type of exercises to do and how soon to start.  · You can take the sling off to shower or bathe.  Follow-up care  Follow up with your healthcare provider if you dont start to get better in the next 5 days.  When to seek medical advice  Call your healthcare provider right away if any of these occur:  · Pain or swelling gets worse or continues for more than a few days  · Your hand or fingers become cold, blue, numb, or tingly  · Large amount of bruising on your shoulder or upper arm  · Difficulty moving your hand or fingers  · Weakness in your hand or fingers  · Your shoulder becomes stiff  · It feels like your shoulder is popping out  · You are less able to do your daily activities  Date Last Reviewed: 10/1/2016  © 2404-4109 "[employer]. [address] [place] [address], [place] [address]. All rights reserved. This information is not intended as a substitute for professional medical care. Always follow your healthcare professional's instructions.

## 2018-12-20 NOTE — PROGRESS NOTES
"Subjective:       Patient ID: Annika Mac is a 68 y.o. female.        Chief Complaint: Shoulder Pain (L pain=8)    Annika Mac is an established patient of Mamie Cotton MD here today for urgent care visit.    Left shoulder pain: very painful if trying to raise the arm, started 2 weeks ago, aching pain, with decreased range of motion, no fall or injury.      DM: blood sugar varies widely, , one reading of "high" in past 2 weeks.      HTN: on amlodipine, labetalol, irbesartan, lasix, BP acceptable.    Lipids: on simvastatin.           Review of Systems   Constitutional: Negative for chills, diaphoresis, fatigue and fever.   HENT: Negative for congestion and sore throat.    Eyes: Negative for visual disturbance.   Respiratory: Negative for cough, chest tightness and shortness of breath.    Cardiovascular: Negative for chest pain, palpitations and leg swelling.   Gastrointestinal: Negative for abdominal pain, blood in stool, constipation, diarrhea, nausea and vomiting.   Genitourinary: Negative for dysuria, frequency, hematuria and urgency.   Musculoskeletal: Positive for arthralgias (left shoulder pain). Negative for back pain.   Skin: Negative for rash.   Neurological: Negative for dizziness, syncope, weakness and headaches.   Psychiatric/Behavioral: Negative for dysphoric mood and sleep disturbance. The patient is not nervous/anxious.        Objective:      Physical Exam   Constitutional: She appears well-developed and well-nourished.   HENT:   Head: Normocephalic.   Right Ear: External ear normal.   Left Ear: External ear normal.   Mouth/Throat: Oropharynx is clear and moist.   Eyes: Pupils are equal, round, and reactive to light.   Cardiovascular: Normal rate, regular rhythm and normal heart sounds. Exam reveals no gallop and no friction rub.   No murmur heard.  Pulmonary/Chest: Effort normal and breath sounds normal. No respiratory distress.   Abdominal: Soft. Normal appearance. There is no " "tenderness.   Musculoskeletal: She exhibits no edema.        Left shoulder: She exhibits decreased range of motion and tenderness (lateral shoulder). She exhibits no spasm, normal pulse and normal strength.   Neurological: She is alert.   Skin: Skin is warm and dry.   Psychiatric: She has a normal mood and affect.   Nursing note and vitals reviewed.      Assessment:       1. Acute pain of left shoulder    2. Obstructive sleep apnea    3. Type 2 diabetes mellitus with diabetic polyneuropathy, with long-term current use of insulin    4. Essential hypertension        Plan:       Annika was seen today for shoulder pain.    Diagnoses and all orders for this visit:    Acute pain of left shoulder  -     X-Ray Shoulder 2 or More Views Left; Future  -     Ambulatory consult to Orthopedics    Obstructive sleep apnea: asking about new CPAP supplies, advised to contact PCP    Type 2 diabetes mellitus with diabetic polyneuropathy, with long-term current use of insulin: has endo f/u 1/4/19, blood sugar varies widely    Essential hypertension: stable and controlled    She will see ortho today for further evaluation.    Pt has been given instructions populated from [employer] database and has verbalized understanding of the after visit summary and information contained wherein.    Follow up with a primary care provider. May go to ER for acute shortness of breath, lightheadedness, fever, or any other emergent complaints or changes in condition.    "This note will be shared with the patient"    Future Appointments   Date Time Provider Department Center   1/2/2019 10:50 AM [facility] LAB [place]   1/2/2019 11:45 AM [facility] OIC-MRI1 [facility] MRI [facility]   1/4/2019  9:40 AM Kasi Aleman MD [facility] RHEUM [facility]   1/4/2019 10:30 AM Ursula Salinas NP [facility] ENDODIA [facility]   1/4/2019 11:00 AM Flori Miller RD [facility] DIABETE [facility]   1/4/2019  1:40 PM Lynne Spaulding NP [facility] DERM [facility]   1/4/2019  " 3:00 PM Homero Leggett PA-C [facility] ORTHO [facility]   2/13/2019 10:20 AM Micaela Barrientos MD [facility] PHYSMED [facility]   3/7/2019 10:15 AM Enid Hines DPM [facility] POD [facility]

## 2018-12-20 NOTE — PROGRESS NOTES
"  SUBJECTIVE:     Chief Complaint & History of Present Illness:  Annika Mac is a  Established  patient 68 y.o. female who is seen here today with a complaint of  left shoulder pain .  She is a patient well-known to me was last seen treated the clinic 08/24/2018 for her right ankle pain.  From which she is doing very well concerns today revolve around pain and tenderness in the left shoulder with the associated decreases in range of motion.  There has been no trauma or injury to the area. But she is complaining of 10/10 pain with any sort of movements greater than shoulder height.  On a scale of 1-10, with 10 being worst pain imaginable, he rates this pain as 4 on good days and 9 on bad days.  she describes the pain as tender.    Review of patient's allergies indicates:   Allergen Reactions    Iodinated contrast- oral and iv dye Rash         Current Outpatient Medications   Medication Sig Dispense Refill    allopurinol (ZYLOPRIM) 100 MG tablet Take 3 tablets (300 mg total) by mouth once daily. 270 tablet 3    amLODIPine (NORVASC) 5 MG tablet Take 1 tablet (5 mg total) by mouth once daily. 90 tablet 1    augmented betamethasone dipropionate (DIPROLENE-AF) 0.05 % ointment AAA bid 45 g 0    BD ULTRA-FINE KIKA PEN NEEDLE 32 gauge x 5/32" Ndle To use 4 times daily 200 each 20    blood-glucose meter kit Use as instructed, true test/result meter 1 each 0    clobetasol (TEMOVATE) 0.05 % cream Apply topically 2 (two) times daily as needed. 30 g 2    DULoxetine (CYMBALTA) 30 MG capsule TAKE 1 CAPSULE DAILY 90 capsule 1    DULoxetine (CYMBALTA) 30 MG capsule Take 1 capsule (30 mg total) by mouth 2 (two) times daily. 180 capsule 3    famotidine (PEPCID) 20 MG tablet Take 1 tablet (20 mg total) by mouth 2 (two) times daily. 1 tablet 0    fexofenadine (ALLEGRA) 180 MG tablet TAKE 1 TABLET BY MOUTH ONCE DAILY 30 tablet 0    furosemide (LASIX) 20 MG tablet Take 1 tablet (20 mg total) by mouth once daily. Take an extra " pill if short of breath or leg swelling 120 tablet 3    insulin regular hum U-500 conc (HUMULIN R U-500, CONC, KWIKPEN) 500 unit/mL (3 mL) InPn Inject  Units into the skin 3 (three) times daily before meals. Take 115u with breakfast,85u with lunch, and 50u with dinner. (Patient taking differently: Inject  Units into the skin 3 (three) times daily before meals. Take 115u with breakfast,140 with large and 120u with small.  100 unit with small 90 with large. Supper 65 large 50 small.) 11 Syringe 4    irbesartan (AVAPRO) 300 MG tablet Take 0.5 tablets (150 mg total) by mouth every evening. 90 tablet 3    labetalol (NORMODYNE) 300 MG tablet Take 1 tablet (300 mg total) by mouth 2 (two) times daily. 180 tablet 3    lancets 31 gauge Misc 1 lancet by Misc.(Non-Drug; Combo Route) route 4 (four) times daily. 150 each 6    liraglutide 0.6 mg/0.1 mL, 18 mg/3 mL, subq PNIJ 0.6 mg/0.1 mL (18 mg/3 mL) PnIj Inject 0.6 mg daily into the skin for 2 weeks then increase to 1.2 mg thereafter daily. 3 mL 11    polyethylene glycol (COLYTE) 240-22.72-6.72 -5.84 gram SolR Drink half a bottle:Take 8 oz q 15 minutes until half a bottle is complete, as directed. 4000 mL 0    pregabalin (LYRICA) 100 MG capsule TAKE 1 CAPSULE BY MOUTH TWO TIMES DAILY 180 capsule 1    PROCTOZONE-HC 2.5 % rectal cream PLACE RECTALLY TWICE DAILY AS NEEDED FOR HEMORRHOIDS 30 g 2    simvastatin (ZOCOR) 40 MG tablet Take 0.5 tablets (20 mg total) by mouth every evening. 90 tablet 1    spironolactone (ALDACTONE) 25 MG tablet Take 0.5 tablets (12.5 mg total) by mouth once daily. 90 tablet 2    TRUETEST TEST STRIPS Strp 1 strip by Misc.(Non-Drug; Combo Route) route 4 (four) times daily. 400 strip 4    aspirin 81 MG Chew Take 1 tablet (81 mg total) by mouth once daily.  0    diclofenac sodium 1 % Gel Apply 2 g topically 3 (three) times daily. for 10 days 400 g 0    HYDROcodone-acetaminophen (NORCO)  mg per tablet Take 1 tablet by mouth  every 6 (six) hours as needed for Pain. 120 tablet 0     No current facility-administered medications for this visit.        Past Medical History:   Diagnosis Date    Allergy     Anemia 2012    Arthritis     CHF (congestive heart failure)     CKD (chronic kidney disease) stage 3, GFR 30-59 ml/min 2012    Clotting disorder     Colon polyp     Deep vein thrombophlebitis of left leg 2012    Degenerative disc disease     Diabetic peripheral neuropathy associated with type 2 diabetes mellitus 2012    GERD (gastroesophageal reflux disease)     HTN (hypertension) 2012    Hyperlipidemia 2012    Lead-induced gout of ankle or foot     right going on three weeks    Nonproliferative diabetic retinopathy of right eye 2012    Obstructive sleep apnea 2012    Osteoporosis     Proliferative diabetic retinopathy of left eye 2012    Stroke 2003    Type 2 diabetes mellitus with diabetic polyneuropathy 2012    Ulcer        Past Surgical History:   Procedure Laterality Date    CATARACT EXTRACTION W/  INTRAOCULAR LENS IMPLANT Left 3/2002    left     CATARACT EXTRACTION W/  INTRAOCULAR LENS IMPLANT Right     OD dr. olivares     SECTION      COLONOSCOPY N/A 2018    Procedure: COLONOSCOPY;  Surgeon: Faizan Mac MD;  Location: [facility] (2ND FLR);  Service: Endoscopy;  Laterality: N/A;    COLONOSCOPY N/A 2018    Performed by Faizan Mac MD at [facility] (2ND FLR)    CYST REMOVAL  2011    left side of face    ESOPHAGOGASTRODUODENOSCOPY (EGD) N/A 2018    Performed by Faizan Mac MD at [facility] (2ND FLR)    EYE SURGERY Bilateral 2012    eye implants    GANGLION CYST EXCISION  2007    Right wrist    INSERTION, IOL PROSTHESIS Right 2012    Per[facility] Linda [facility] at [facility]ST F[facility]    Pan Retinal Photocoagulation Bilateral     Dr. Monterroso (Proliferative Diabetic Retinopathy)     "PHACOEMULSIFICATION, CATARACT Right 12/19/2012    [facility] F[facility]    TOTAL ABDOMINAL HYSTERECTOMY  1982    TOTAL KNEE ARTHROPLASTY  2/2006    left    TRIGGER FINGER RELEASE  9/18/2009       Vital Signs (Most Recent)  Vitals:    12/20/18 1307   BP: 135/74   Pulse: 72       Review of Systems:  ROS:  Constitutional: no fever or chills  Eyes: no visual changes  ENT: no nasal congestion or sore throat  Respiratory: no cough or shortness of breath  Cardiovascular: no chest pain or palpitations, CHF, CAD, stroke  Gastrointestinal: no nausea or vomiting, tolerating diet, GERD  Genitourinary: no hematuria or dysuria, chronic kidney disease stage III  Integument/Breast: no rash or pruritis  Hematologic/Lymphatic: no easy bruising or lymphadenopathy  Musculoskeletal: positive for arthralgias, back pain, myalgias, neck pain, stiff joints and gouty arthritis, negative for muscle weakness  Neurological: no seizures or tremors  Behavioral/Psych: no auditory or visual hallucinations  Endocrine: no heat or cold intolerance, diabetes type 2 with neuropathy      OBJECTIVE:     PHYSICAL EXAM:  Height: 5' 5" (165.1 cm) Weight: (!) 137.3 kg (302 lb 11.1 oz), General Appearance: Well nourished, well developed, in no acute distress.  Neurological: Mood & affect are normal.  Shoulder exam: left  Tenderness: globally  ROM: forward flexion 120 / 120, extension 70 / 70, full abduction 110/110, abduction - glenohumeral 70 / 70, external rotation 30 / 30, internal rotation mid sagittal on  Shoulder Strength: biceps 5/5, triceps 5/5, abduction 5/5, adduction 5/5, external rotation 5/5 with shoulder at side, flexion 5/5, and extension 5/5  positive for tenderness about the glenohumeral joint, positive for tenderness over the acromioclavicular joint and positive for impingement sign  Stability tests: anterior apprehension test positive for pain only and posterior apprehension test positive for pain " only                       RADIOGRAPHS:  X-rays taken today films reviewed by me demonstrate mild arthritic changes of the shoulder without evidence of advanced degenerative joint disease loss of glenohumeral or acromioclavicular joint spaces fracture dislocation or any other bony abnormalities    ASSESSMENT/PLAN:     Plan: We discussed with the patient at length all the different treatment options available for her leftshoulder including anti-inflammatories, acetaminophen, rest, ice, Physical therapy to include strengthening exercise, occasional cortisone injections for temporary relief, arthroscopic surgical repair, and finally shoulder arthroplasty.   Will unable to proceed with a cortisone injection secondary to and recent A1c of 9.6    MRI left shoulder  Compound pain cream to affected area up to t.i.d.  Consult physical therapy for shoulder to include dry needling  Follow-up after MRI

## 2018-12-20 NOTE — LETTER
December 20, 2018      Susie Lpoez PA-C  [address]  [facility] [address]           [facility] - Orthopedics  [address]  [facility] [address]  Phone: [phone]          Patient: Annika Mac   MR Number: [id number]   YOB: 1950   Date of Visit: 12/20/2018       Dear Susie Lopez:    Thank you for referring Annika Mac to me for evaluation. Attached you will find relevant portions of my assessment and plan of care.    If you have questions, please do not hesitate to call me. I look forward to following Annika Mac along with you.    Sincerely,    Homero Leggett PA-C    Enclosure  CC:  No Recipients    If you would like to receive this communication electronically, please contact [email or web address] or [phone] to request more information on [employer] Link access.    For providers and/or their staff who would like to refer a patient to [facility], please contact us through our one-stop-shop provider referral line, [facility], at [phone].    If you feel you have received this communication in error or would no longer like to receive these types of communications, please e-mail [email or web address]

## 2018-12-27 ENCOUNTER — PES CALL (OUTPATIENT)
Dept: ADMINISTRATIVE | Facility: CLINIC | Age: 68
End: 2018-12-27

## 2019-01-02 ENCOUNTER — HOSPITAL ENCOUNTER (OUTPATIENT)
Dept: RADIOLOGY | Facility: HOSPITAL | Age: 69
Discharge: HOME | End: 2019-01-02
Attending: PHYSICIAN ASSISTANT
Payer: MEDICARE

## 2019-01-02 DIAGNOSIS — M25.312 INSTABILITY OF LEFT SHOULDER JOINT: ICD-10-CM

## 2019-01-02 DIAGNOSIS — M25.512 ACUTE PAIN OF LEFT SHOULDER: ICD-10-CM

## 2019-01-02 PROCEDURE — 73221 MRI JOINT UPR EXTREM W/O DYE: CPT | Mod: TC,LT

## 2019-01-02 PROCEDURE — 73221 MRI SHOULDER WITHOUT CONTRAST LEFT: ICD-10-PCS | Mod: 26,LT,, | Performed by: RADIOLOGY

## 2019-01-02 PROCEDURE — 73221 MRI JOINT UPR EXTREM W/O DYE: CPT | Mod: 26,LT,, | Performed by: RADIOLOGY

## 2019-01-02 NOTE — PROGRESS NOTES
"CC: This 68 y.o. female presents for management of Diabetes    HPI: Diagnosed with T2DM in July 1987 when c/o polydipsia and polyuria. Started oral agents then NPH and Regular insulin. Converted to MDI with Lantus and Novolog in 2/2006 after knee surgery. D/c TZD r/t weight gain 7/08 and added Symlin. Stopped Symlin 2/09. Januvia added 2/10. D/C Januvia 12/10 r/t cost; resumed though pt assistance in 2016. Converted to U500 in 7/10.   Received DM Education 1/22/08.   11/2013 she experienced severe hypoglycemia at home,  called 911, "nonresponsive" so brought to local ED for hypoglycemia BG 30s in 11/2013.     Medical conditions also include obesity, RUFINA, diastolic dysfunction, h/o stroke, PAD, and CKD 3.  Follows with cardiology, vascular medicine, and nephrology.   Also has chronic back pain, which limits activity and contributes to wt gain. Has followed with physical medicine.   Has chronic cough 2/2 GERD.   DM complicated with PDR with ME, PN.    Since her last visit she has had 6 teeth removed 8/15/2018 had to take abx & pain medicine and her BG have been elevated during this time     Results for [facility] DEWEY (MRN [id number]) as of 1/4/2019 10:44   Ref. Range 1/2/2019 10:35   Fructosamine Latest Ref Range: SeeBelow umol /L 499   Results for [facility] DEWEY (MRN [id number]) as of 1/4/2019 10:44   Ref. Range 1/2/2019 10:35   Glucose Latest Ref Range: 70 - 110 mg/dL 431 (H)     She is again on U500 as Overall BGs were markedly elevated.    Readings Check 3-4x daily.          She has been working on her diet as well.     Denies hypoglycemia.  Knows how to correct with 15 grams of carbs- juice, coke, or a peppermint.     DM ed reports to have good knowledge of CHO portions.     CURRENT DM MEDS:   Humulin U500 pen: breakfast dose 150-130 U for smaller breakfast, lunch dose 100 ufor bigger lunch increase to 90U; dinner dose 75units/ for smaller dinner, decrease dose to 65 units.   Victoza 1.8 mg     She is taking insulin " "at least 30 minutes before meals    Denies missed doses.  Has Medicare Extra Help    Eats 3 meals daily, + snacks  Drinks water, unsweet tea.   No formal exercise.     Diabetes Management Status  Statin: Taking  ACE/ARB: Taking  Screening or Prevention Patient's value Goal Complete/Controlled?   HgA1C Testing and Control   Lab Results   Component Value Date    HGBA1C 9.3 (H) 01/02/2019      Annually/Less than 8% No   Lipid profile : 04/23/2018 Annually Yes   LDL control Lab Results   Component Value Date    LDLCALC 72.0 04/23/2018    Annually/Less than 100 mg/dl  Yes   Nephropathy screening Lab Results   Component Value Date    LABMICR 10.0 10/13/2017     Lab Results   Component Value Date    PROTEINUA Negative 05/16/2017    Annually Yes   Blood pressure BP Readings from Last 1 Encounters:   01/04/19 (!) 120/50    Less than 140/90 Yes   Dilated retinal exam : 10/30/2018 Annually Yes   Foot exam   : 05/18/2018 Annually No     Denies history of pancreatitis & personal/family history of medullary thyroid cancer.     ROS:   Gen: Appetite good, denies fatigue and weakness.  Skin: No rashes, no hair changes.  Eyes: Denies visual disturbances  Resp: no SOB or MCFADDEN, no cough today  Cardiac: No palpitations, chest pain, no edema   GI: No nausea or vomiting, diarrhea, constipation, or abdominal pain.  MS/Neuro: Denies numbness/ tingling in BLE; Gait steady, speech clear  Chronic back pain.   Psych: Denies drug/ETOH abuse, no hx of depression.  Other systems: negative.    PE: BP (!) 120/50   Pulse 64   Resp 18   Ht 5' 5" (1.651 m)   Wt 133.8 kg (295 lb)   BMI 49.09 kg/m²     GENERAL: Well developed, well nourished.  PSYCH: AAOx3, appropriate mood and affect, pleasant expression, conversant, appears relaxed, well groomed.   EYES: Conjunctiva, corneas clear  NECK: Supple, trachea midline.  CHEST: Resp even and unlabored  CARDIAC: RRR, +2/6 murmur lower left sternal border  ABDOMEN: Soft, non-tender, non-distended; +BSs " x4  NEURO: Gait steady  SKIN: Skin warm and dry, + acanthosis nigracans. Raised, erythremic rash on left hand.   FEET:  Appropriate footwear, Foot exam deferred, done 05/2018  Injection sites are ok. No lipo hypertropthy or atrophy.     ASSESSMENT and PLAN:  1. Type 2 diabetes mellitus with diabetic polyneuropathy, with long-term current use of insulin  Hemoglobin A1c    Fructosamine    Comprehensive metabolic panel    Microalbumin/creatinine urine ratio   2. 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 Polyneuropathy associated with underlying disease     5. Dyslipidemia     6. Essential hypertension     7. Anemia, unspecified type     8. Morbid obesity due to excess calories        1T2DM: uncontrolled, with neuropathy, retinopathy, nephropathy.   -- Reviewed goals of therapy are to get the best control we can without hypoglycemia  -- Change u500 150 (130 small) breakfast, 110 (100 small lunch), & 80 (70 small dinner)  -- continue victoza 1.2 mg daily    -- Reviewed patient's current insulin regimen. Clarified proper insulin dose and timing in relation to meals, etc. Insulin injection sites and proper rotation instructed.    -- Advised frequent self blood glucose monitoring.  Patient encouraged to document glucose results and bring them to every clinic visit. Send logs in 1 month.  -- Hypoglycemia precautions discussed. Instructed on precautions before driving.    -- Call for Bg repeatedly < 90 or > 180.   -- Close adherence to lifestyle changes recommended.   -- Periodic follow ups for eye evaluations, foot care and dental care suggested.     - takes ASA, ARB, statin     2. PDR with macular edema - optimize BG. Continue follow with Ophth.       3. CKD - GFR 25.6, on ACEi therapy. No metformin or SLGT2i. Saw nephrology 03/2018.     4. Peripheral  neuropathy - on Lyrica and Cymbalta with relief.      5. HLP - Controlled. Continue statin.      6. Morbid obesity   Increases insulin resistance. Chronic pain limits physical activity.  Discussed medical wt loss. She is not interested in wt management at this time.     7. HTN - Controlled. On ARB.      Anemia  -- can alter a1c levels, check fructosamine as well.     Follow-up in about 3 months (around 4/4/2019).   Labs & urine prior    Case discussed with Dr. Pat   Recommendations were discussed with the patient in detail  The patient verbalized understanding and agrees with the plan outlined as above.

## 2019-01-04 ENCOUNTER — OFFICE VISIT (OUTPATIENT)
Dept: ENDOCRINOLOGY | Facility: CLINIC | Age: 69
End: 2019-01-04
Payer: MEDICARE

## 2019-01-04 ENCOUNTER — OFFICE VISIT (OUTPATIENT)
Dept: DERMATOLOGY | Facility: CLINIC | Age: 69
End: 2019-01-04
Payer: MEDICARE

## 2019-01-04 ENCOUNTER — OFFICE VISIT (OUTPATIENT)
Dept: RHEUMATOLOGY | Facility: CLINIC | Age: 69
End: 2019-01-04
Payer: MEDICARE

## 2019-01-04 ENCOUNTER — OFFICE VISIT (OUTPATIENT)
Dept: ORTHOPEDICS | Facility: CLINIC | Age: 69
End: 2019-01-04
Payer: MEDICARE

## 2019-01-04 VITALS
SYSTOLIC BLOOD PRESSURE: 120 MMHG | BODY MASS INDEX: 48.82 KG/M2 | HEIGHT: 65 IN | RESPIRATION RATE: 18 BRPM | DIASTOLIC BLOOD PRESSURE: 50 MMHG | WEIGHT: 293 LBS | HEART RATE: 64 BPM

## 2019-01-04 VITALS
DIASTOLIC BLOOD PRESSURE: 57 MMHG | SYSTOLIC BLOOD PRESSURE: 143 MMHG | HEART RATE: 79 BPM | BODY MASS INDEX: 48.3 KG/M2 | WEIGHT: 289.88 LBS | HEIGHT: 65 IN

## 2019-01-04 VITALS
HEART RATE: 70 BPM | BODY MASS INDEX: 48.32 KG/M2 | HEIGHT: 65 IN | WEIGHT: 290 LBS | SYSTOLIC BLOOD PRESSURE: 112 MMHG | DIASTOLIC BLOOD PRESSURE: 60 MMHG

## 2019-01-04 DIAGNOSIS — E66.01 MORBID OBESITY DUE TO EXCESS CALORIES: Chronic | ICD-10-CM

## 2019-01-04 DIAGNOSIS — E11.42 TYPE 2 DIABETES MELLITUS WITH DIABETIC POLYNEUROPATHY, WITH LONG-TERM CURRENT USE OF INSULIN: Primary | ICD-10-CM

## 2019-01-04 DIAGNOSIS — E11.22 TYPE 2 DM WITH CKD STAGE 4 AND HYPERTENSION: ICD-10-CM

## 2019-01-04 DIAGNOSIS — E78.5 DYSLIPIDEMIA: Chronic | ICD-10-CM

## 2019-01-04 DIAGNOSIS — M1A.09X0 IDIOPATHIC CHRONIC GOUT OF MULTIPLE SITES WITHOUT TOPHUS: Primary | ICD-10-CM

## 2019-01-04 DIAGNOSIS — D64.9 ANEMIA, UNSPECIFIED TYPE: Chronic | ICD-10-CM

## 2019-01-04 DIAGNOSIS — I12.9 TYPE 2 DM WITH CKD STAGE 4 AND HYPERTENSION: ICD-10-CM

## 2019-01-04 DIAGNOSIS — M25.512 ACUTE PAIN OF LEFT SHOULDER: Primary | ICD-10-CM

## 2019-01-04 DIAGNOSIS — N18.4 TYPE 2 DM WITH CKD STAGE 4 AND HYPERTENSION: ICD-10-CM

## 2019-01-04 DIAGNOSIS — L30.9 HAND ECZEMA: Primary | ICD-10-CM

## 2019-01-04 DIAGNOSIS — Z79.4 TYPE 2 DIABETES MELLITUS WITH DIABETIC POLYNEUROPATHY, WITH LONG-TERM CURRENT USE OF INSULIN: Primary | ICD-10-CM

## 2019-01-04 DIAGNOSIS — M25.312 INSTABILITY OF LEFT SHOULDER JOINT: ICD-10-CM

## 2019-01-04 DIAGNOSIS — G63 POLYNEUROPATHY ASSOCIATED WITH UNDERLYING DISEASE: ICD-10-CM

## 2019-01-04 DIAGNOSIS — L65.9 HAIR LOSS DISORDER: ICD-10-CM

## 2019-01-04 DIAGNOSIS — I10 ESSENTIAL HYPERTENSION: ICD-10-CM

## 2019-01-04 PROCEDURE — 99999 PR PBB SHADOW E&M-EST. PATIENT-LVL III: ICD-10-PCS | Mod: PBBFAC,,, | Performed by: NURSE PRACTITIONER

## 2019-01-04 PROCEDURE — 99999 PR PBB SHADOW E&M-EST. PATIENT-LVL IV: ICD-10-PCS | Mod: PBBFAC,,, | Performed by: PHYSICIAN ASSISTANT

## 2019-01-04 PROCEDURE — 99999 PR PBB SHADOW E&M-EST. PATIENT-LVL IV: CPT | Mod: PBBFAC,,, | Performed by: PHYSICIAN ASSISTANT

## 2019-01-04 PROCEDURE — 99213 OFFICE O/P EST LOW 20 MIN: CPT | Mod: S$PBB,,, | Performed by: PHYSICIAN ASSISTANT

## 2019-01-04 PROCEDURE — 99214 PR OFFICE/OUTPT VISIT, EST, LEVL IV, 30-39 MIN: ICD-10-PCS | Mod: S$PBB,,, | Performed by: INTERNAL MEDICINE

## 2019-01-04 PROCEDURE — 99214 PR OFFICE/OUTPT VISIT, EST, LEVL IV, 30-39 MIN: ICD-10-PCS | Mod: S$PBB,,, | Performed by: NURSE PRACTITIONER

## 2019-01-04 PROCEDURE — 99213 OFFICE O/P EST LOW 20 MIN: CPT | Mod: PBBFAC,27 | Performed by: NURSE PRACTITIONER

## 2019-01-04 PROCEDURE — 99999 PR PBB SHADOW E&M-EST. PATIENT-LVL III: CPT | Mod: PBBFAC,,, | Performed by: INTERNAL MEDICINE

## 2019-01-04 PROCEDURE — 99999 PR PBB SHADOW E&M-EST. PATIENT-LVL III: ICD-10-PCS | Mod: PBBFAC,,, | Performed by: INTERNAL MEDICINE

## 2019-01-04 PROCEDURE — 99214 OFFICE O/P EST MOD 30 MIN: CPT | Mod: S$PBB,,, | Performed by: NURSE PRACTITIONER

## 2019-01-04 PROCEDURE — 99214 OFFICE O/P EST MOD 30 MIN: CPT | Mod: S$PBB,,, | Performed by: INTERNAL MEDICINE

## 2019-01-04 PROCEDURE — 99214 OFFICE O/P EST MOD 30 MIN: CPT | Mod: PBBFAC,27 | Performed by: PHYSICIAN ASSISTANT

## 2019-01-04 PROCEDURE — 99213 OFFICE O/P EST LOW 20 MIN: CPT | Mod: PBBFAC | Performed by: INTERNAL MEDICINE

## 2019-01-04 PROCEDURE — 99999 PR PBB SHADOW E&M-EST. PATIENT-LVL III: CPT | Mod: PBBFAC,,, | Performed by: NURSE PRACTITIONER

## 2019-01-04 PROCEDURE — 99213 PR OFFICE/OUTPT VISIT, EST, LEVL III, 20-29 MIN: ICD-10-PCS | Mod: S$PBB,,, | Performed by: PHYSICIAN ASSISTANT

## 2019-01-04 RX ORDER — FLUOCINONIDE TOPICAL SOLUTION USP, 0.05% 0.5 MG/ML
SOLUTION TOPICAL
Qty: 60 ML | Refills: 3 | Status: SHIPPED | OUTPATIENT
Start: 2019-01-04 | End: 2021-01-04 | Stop reason: ALTCHOICE

## 2019-01-04 RX ORDER — ALLOPURINOL 100 MG/1
300 TABLET ORAL DAILY
Qty: 270 TABLET | Refills: 3 | Status: SHIPPED | OUTPATIENT
Start: 2019-01-04 | End: 2019-04-04

## 2019-01-04 RX ORDER — KETOCONAZOLE 20 MG/ML
SHAMPOO, SUSPENSION TOPICAL
Qty: 120 ML | Refills: 5 | Status: SHIPPED | OUTPATIENT
Start: 2019-01-04 | End: 2020-02-12

## 2019-01-04 ASSESSMENT — ROUTINE ASSESSMENT OF PATIENT INDEX DATA (RAPID3)
PATIENT GLOBAL ASSESSMENT SCORE: 8
PAIN SCORE: 8
AM STIFFNESS SCORE: 1, YES
MDHAQ FUNCTION SCORE: .6
FATIGUE SCORE: 8
PSYCHOLOGICAL DISTRESS SCORE: 1.1
TOTAL RAPID3 SCORE: 6

## 2019-01-04 NOTE — PATIENT INSTRUCTIONS
Hands:  Continue applying Diprolene ointment twice daily.     Continue wearing gloves while washing dishes or mopping, etc.    Recommend Eucerin hand creme as often as needed for dryness and itching throughout the day (especially after washing hands, dishes, etc.).      Scalp:  Wash with ketoconazole shampoo 2-3 times weekly. Let sit on scalp for at least 5 minutes prior to washing.    Apply lidex solution to scalp twice daily as needed for itching and flaking.

## 2019-01-04 NOTE — PROGRESS NOTES
No chief complaint on file.    Patient with chronic gout for a follow up    History of presenting illness    67 year old black female comes with long standing gout since April 2017    Episode : right big toe and ankle  : redness,swelling and pain : couldn't walk,couldnt have a sheet touch her  She had fluid analysis : gout crystals  They gave her colcrys   Symptoms went away    Last I saw her in July she had left elbow effusion/gouty flare    Vianca xrays with spurring  Knee xrays with OA ON THE RIGHT  Left one replaced  LS spine OA  Hand and wrist xrays     Has had uric acids of > 10  .1  JOSE MIGUEL negative  ANCA negative    On allopurinol 100 mg since April 2017  We increased the dose to 200 mg  She has had uric acids 6.2/6.9 since  Her GFR stays stable 33 to 41  She has anemia     10/2018 :  we increased her allopurinol to 300 mg  Her uric acid went up to 7.6    We didn't change the dose of allopurinol since we were worried about the kidney function    1/2/2019 her uric acid is 5.2    GFR continues to deteriorate  I have stopped colchicine last time    Creatinine upto 2.2  GFR down to 25.6  Anemia worse 9.4/31.1    No more gout attacks       Past history : DM,RUFINA,prior stroke,PAD,LS spine OA,knee OA,htn,hld    Family history : no family h/o gout    Social history : smoked in her high school years  Used to drink,not anymore    Review of Systems   Constitutional: Negative for activity change, appetite change, chills, diaphoresis, fatigue, fever and unexpected weight change.   HENT: Negative for congestion, dental problem, drooling, ear discharge, ear pain, facial swelling, hearing loss, mouth sores, nosebleeds, postnasal drip, rhinorrhea, sinus pressure, sinus pain, sneezing, sore throat, tinnitus, trouble swallowing and voice change.    Eyes: Negative for photophobia, pain, discharge, redness, itching and visual disturbance.   Respiratory: Negative for apnea, cough, choking, chest tightness, shortness of breath,  wheezing and stridor.    Cardiovascular: Negative for chest pain, palpitations and leg swelling.   Gastrointestinal: Negative for abdominal distention, abdominal pain, anal bleeding, blood in stool, constipation, diarrhea, nausea, rectal pain and vomiting.   Endocrine: Negative for cold intolerance, heat intolerance, polydipsia, polyphagia and polyuria.   Genitourinary: Negative for decreased urine volume, difficulty urinating, dysuria, enuresis, flank pain, frequency, genital sores, hematuria and urgency.   Musculoskeletal: Positive for arthralgias. Negative for back pain, gait problem, joint swelling, myalgias, neck pain and neck stiffness.   Skin: Negative for color change, pallor, rash and wound.   Allergic/Immunologic: Negative for environmental allergies, food allergies and immunocompromised state.   Neurological: Negative for dizziness, tremors, seizures, syncope, facial asymmetry, speech difficulty, weakness, light-headedness, numbness and headaches.   Hematological: Negative for adenopathy. Does not bruise/bleed easily.   Psychiatric/Behavioral: Negative for agitation, behavioral problems, confusion, decreased concentration, dysphoric mood, hallucinations, self-injury, sleep disturbance and suicidal ideas. The patient is not nervous/anxious and is not hyperactive.      Physical Exam     LINDSAY-28 tender joint count: 0  LINDSAY-28 swollen joint count: 0    Physical Exam   Constitutional: She is oriented to person, place, and time and well-developed, well-nourished, and in no distress. No distress.   HENT:   Head: Normocephalic.   Mouth/Throat: Oropharynx is clear and moist.   Eyes: Conjunctivae are normal. Pupils are equal, round, and reactive to light. Right eye exhibits no discharge. Left eye exhibits no discharge. No scleral icterus.   Neck: Normal range of motion. No thyromegaly present.   Cardiovascular: Normal rate, regular rhythm, normal heart sounds and intact distal pulses.    Pulmonary/Chest: Effort  normal and breath sounds normal. No stridor.   Abdominal: Soft. Bowel sounds are normal.   Lymphadenopathy:     She has no cervical adenopathy.   Neurological: She is alert and oriented to person, place, and time.   Skin: Skin is warm. No rash noted. She is not diaphoretic.     Psychiatric: Affect and judgment normal.   Musculoskeletal: Normal range of motion.           Assessment     67 year old black female with DM,RUFINA,prior stroke,PAD,LS spine OA,knee OA,htn,hld  She comes in with one episode of big toe and ankle pain and swelling on the right foot a year ago which resolved with colcrys and another episode of acute onset pain and swelling of the left elbow  She has had a fluid analysis once in the past which has revealed gout crystals she mentions  This is not available at [facility]    She has had hyperuricemia and she has been on allopurinol 100 mg since April 2017  Uric acids have trended down from 10 + to 7.2 while on allopurinol    We have increased the dose of allopurinol to 300 mg     We saw her during her left elbow flare,this responded to prednisone    She has no complaints today    We have achieved uric acid of less than 6    No recent gout flares    Worried about the worsening GFR/creatinine    1. Idiopathic chronic gout of multiple sites without tophus        Reviewed labs/xrays  Reviewed medications    F/u problem    Continue 300 mg allopurinol    Get her back into nephrology clinic used to see     Dietary modifications discussed : gave a hand out of all foods to avoid : avoid organ meat,red meat,seafood,shell fish,anchovies,sardines,alcohol particularly beer,fructose containing soft drinks  Its ok to consume moderate wine,diet drinks,dairy proteins,coffee  Eat cherries  Eat purine rich vegetables    Labs and rtc in 3 months       Diagnoses and all orders for this visit:    Idiopathic chronic gout of multiple sites without tophus  -     CBC auto differential; Future  -     Comprehensive  metabolic panel; Future  -     Uric acid; Future    Other orders  -     allopurinol (ZYLOPRIM) 100 MG tablet; Take 3 tablets (300 mg total) by mouth once daily.

## 2019-01-04 NOTE — PROGRESS NOTES
Subjective:       Patient ID:  Annika Mac is a 68 y.o. female who presents for   Chief Complaint   Patient presents with    Eczema     hands , follow-up, very dry , painful      Eczema  - Follow-up  Symptom course: stable (last seen 10/2/18 ([facility]  Currently using: Diprolene oint bid-tid, Fredy's working hands cream under roger gloves a few times daily, wearing gloves when washing dishes/ cleaning, showers with Ivory liquid soln in hot shower.  Affected locations: left fingers and right fingers  Signs / symptoms: itching and dryness      Pt is also c/o hair loss x 3 months.   - Hx of perms x 4 yrs. Hair has been natural x 5 years.  - Washes hair tiw with non-medicated shampoo.  - Has associated scalp itching and flaking, no tenderness.  - Denies any major life stressors.    Review of Systems   Constitutional: Negative for fever and chills.        Denies smoking.    Skin: Positive for itching and rash. Negative for daily sunscreen use and activity-related sunscreen use.        Denies h/o atopic derm   Hematologic/Lymphatic: Does not bruise/bleed easily.        Objective:    Physical Exam   Constitutional: She appears well-developed and well-nourished. She is obese.  No distress.   Neurological: She is alert and oriented to person, place, and time. She is not disoriented.   Psychiatric: She has a normal mood and affect.   Skin:   Areas Examined (abnormalities noted in diagram):   Scalp / Hair Palpated and Inspected  Head / Face Inspection Performed  Nails and Digits Inspection Performed                  Diagram Legend     Erythematous scaling macule/papule c/w actinic keratosis       Vascular papule c/w angioma      Pigmented verrucoid papule/plaque c/w seborrheic keratosis      Yellow umbilicated papule c/w sebaceous hyperplasia      Irregularly shaped tan macule c/w lentigo     1-2 mm smooth white papules consistent with Milia      Movable subcutaneous cyst with punctum c/w epidermal inclusion cyst       Subcutaneous movable cyst c/w pilar cyst      Firm pink to brown papule c/w dermatofibroma      Pedunculated fleshy papule(s) c/w skin tag(s)      Evenly pigmented macule c/w junctional nevus     Mildly variegated pigmented, slightly irregular-bordered macule c/w mildly atypical nevus      Flesh colored to evenly pigmented papule c/w intradermal nevus       Pink pearly papule/plaque c/w basal cell carcinoma      Erythematous hyperkeratotic cursted plaque c/w SCC      Surgical scar with no sign of skin cancer recurrence      Open and closed comedones      Inflammatory papules and pustules      Verrucoid papule consistent consistent with wart     Erythematous eczematous patches and plaques     Dystrophic onycholytic nail with subungual debris c/w onychomycosis     Umbilicated papule    Erythematous-base heme-crusted tan verrucoid plaque consistent with inflamed seborrheic keratosis     Erythematous Silvery Scaling Plaque c/w Psoriasis     See annotation      Assessment / Plan:        Hand eczema (EUP)  Continue applying Diprolene ointment twice daily.     Continue wearing gloves while washing dishes, mopping, etc.    Recommend OTC Eucerin hand creme as often as needed for dryness and itching throughout the day (especially after washing hands/ dishes).      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    Consider ILK at f/u appt.         Follow-up in about 2 months (around 3/4/2019).

## 2019-01-04 NOTE — PROGRESS NOTES
"  SUBJECTIVE:     Chief Complaint & History of Present Illness:  Annika Mac is a  Established  patient 68 y.o. female who is seen here today with a complaint of  left shoulder pain .  She is a patient well-known to me was last seen treated the clinic for this condition 12/20/2018.  At that time we had opted not to proceed with the cortisone injection secondary to her A1c we have sent her for physical therapy and MRI.  She has not started physical therapy yet but is here today to review her MRI results  On a scale of 1-10, with 10 being worst pain imaginable, he rates this pain as 4 on good days and 9 on bad days.  she describes the pain as tender and sore.    Review of patient's allergies indicates:   Allergen Reactions    Iodinated contrast- oral and iv dye Rash         Current Outpatient Medications   Medication Sig Dispense Refill    allopurinol (ZYLOPRIM) 100 MG tablet Take 3 tablets (300 mg total) by mouth once daily. 270 tablet 3    amLODIPine (NORVASC) 5 MG tablet Take 1 tablet (5 mg total) by mouth once daily. 90 tablet 1    aspirin 81 MG Chew Take 1 tablet (81 mg total) by mouth once daily.  0    augmented betamethasone dipropionate (DIPROLENE-AF) 0.05 % ointment AAA bid 45 g 0    BD ULTRA-FINE KIKA PEN NEEDLE 32 gauge x 5/32" Ndle To use 4 times daily 200 each 20    blood-glucose meter kit Use as instructed, true test/result meter 1 each 0    clobetasol (TEMOVATE) 0.05 % cream Apply topically 2 (two) times daily as needed. 30 g 2    diclofenac sodium 1 % Gel Apply 2 g topically 3 (three) times daily. for 10 days 400 g 0    DULoxetine (CYMBALTA) 30 MG capsule TAKE 1 CAPSULE DAILY 90 capsule 1    DULoxetine (CYMBALTA) 30 MG capsule Take 1 capsule (30 mg total) by mouth 2 (two) times daily. 180 capsule 3    famotidine (PEPCID) 20 MG tablet Take 1 tablet (20 mg total) by mouth 2 (two) times daily. 1 tablet 0    fexofenadine (ALLEGRA) 180 MG tablet TAKE 1 TABLET BY MOUTH ONCE DAILY 30 tablet 0 "    furosemide (LASIX) 20 MG tablet Take 1 tablet (20 mg total) by mouth once daily. Take an extra pill if short of breath or leg swelling 120 tablet 3    HYDROcodone-acetaminophen (NORCO)  mg per tablet Take 1 tablet by mouth every 6 (six) hours as needed for Pain. 120 tablet 0    insulin regular hum U-500 conc (HUMULIN R U-500, CONC, KWIKPEN) 500 unit/mL (3 mL) InPn Inject  Units into the skin 3 (three) times daily before meals. Take 115u with breakfast,85u with lunch, and 50u with dinner. (Patient taking differently: Inject  Units into the skin 3 (three) times daily before meals. Take 115u with breakfast,140 with large and 120u with small.  100 unit with small 90 with large. Supper 65 large 50 small.) 11 Syringe 4    irbesartan (AVAPRO) 300 MG tablet Take 0.5 tablets (150 mg total) by mouth every evening. 90 tablet 3    labetalol (NORMODYNE) 300 MG tablet Take 1 tablet (300 mg total) by mouth 2 (two) times daily. 180 tablet 3    lancets 31 gauge Misc 1 lancet by Misc.(Non-Drug; Combo Route) route 4 (four) times daily. 150 each 6    liraglutide 0.6 mg/0.1 mL, 18 mg/3 mL, subq PNIJ 0.6 mg/0.1 mL (18 mg/3 mL) PnIj Inject 0.6 mg daily into the skin for 2 weeks then increase to 1.2 mg thereafter daily. 3 mL 11    pregabalin (LYRICA) 100 MG capsule TAKE 1 CAPSULE BY MOUTH TWO TIMES DAILY 180 capsule 1    PROCTOZONE-HC 2.5 % rectal cream PLACE RECTALLY TWICE DAILY AS NEEDED FOR HEMORRHOIDS 30 g 2    simvastatin (ZOCOR) 40 MG tablet Take 0.5 tablets (20 mg total) by mouth every evening. 90 tablet 1    spironolactone (ALDACTONE) 25 MG tablet Take 0.5 tablets (12.5 mg total) by mouth once daily. 90 tablet 2    TRUETEST TEST STRIPS Strp 1 strip by Misc.(Non-Drug; Combo Route) route 4 (four) times daily. 400 strip 4     No current facility-administered medications for this visit.        Past Medical History:   Diagnosis Date    Allergy     Anemia 7/27/2012    Arthritis     CHF (congestive  heart failure)     CKD (chronic kidney disease) stage 3, GFR 30-59 ml/min 2012    Clotting disorder     Colon polyp     Deep vein thrombophlebitis of left leg 2012    Degenerative disc disease     Diabetic peripheral neuropathy associated with type 2 diabetes mellitus 2012    GERD (gastroesophageal reflux disease)     HTN (hypertension) 2012    Hyperlipidemia 2012    Lead-induced gout of ankle or foot     right going on three weeks    Nonproliferative diabetic retinopathy of right eye 2012    Obstructive sleep apnea 2012    Osteoporosis     Proliferative diabetic retinopathy of left eye 2012    Stroke 2003    Type 2 diabetes mellitus with diabetic polyneuropathy 2012    Ulcer        Past Surgical History:   Procedure Laterality Date    CATARACT EXTRACTION W/  INTRAOCULAR LENS IMPLANT Left 3/2002    left     CATARACT EXTRACTION W/  INTRAOCULAR LENS IMPLANT Right     OD dr. olivares     SECTION      COLONOSCOPY N/A 2018    Performed by Faizan Mac [facility] (2ND FLR)    CYST REMOVAL  2011    left side of face    ESOPHAGOGASTRODUODENOSCOPY (EGD) N/A 2018    [facility] at [facility] END[facility] (2ND FLR)    EYE SURGERY Bilateral 2012    eye implants    GANGLION CYST EXCISION  2007    Right wrist    INSERTION, IOL PROSTHESIS Right 2012    Per[facility] Linda [facility]ares [facility] at [facility] OR 1ST FLR    Pan Retinal Photocoagulation Bilateral     Dr. Monterroso (Proliferative Diabetic Retinopathy)    PHACOEMULSIFICATION, CATARACT Right 2012    Per[facility] at [facility] OR 1ST FLR    TOTAL ABDOMINAL HYSTERECTOMY  1982    TOTAL KNEE ARTHROPLASTY  2006    left    TRIGGER FINGER RELEASE  2009       Vital Signs (Most Recent)  Vitals:    19 1440   BP: (!) 143/57   Pulse: 79       Review of Systems:  ROS:  Constitutional: no fever or chills  Eyes: no visual  "changes  ENT: no nasal congestion or sore throat  Respiratory: no cough or shortness of breath  Cardiovascular: no chest pain or palpitations, CHF, CAD, stroke  Gastrointestinal: no nausea or vomiting, tolerating diet, GERD  Genitourinary: no hematuria or dysuria, chronic kidney disease stage III  Integument/Breast: no rash or pruritis  Hematologic/Lymphatic: no easy bruising or lymphadenopathy  Musculoskeletal: positive for arthralgias, back pain, myalgias, neck pain, stiff joints and gouty arthritis, negative for muscle weakness  Neurological: no seizures or tremors  Behavioral/Psych: no auditory or visual hallucinations  Endocrine: no heat or cold intolerance, diabetes type 2 with neuropathy    OBJECTIVE:     PHYSICAL EXAM:  Height: 5' 5" (165.1 cm) Weight: 131.5 kg (289 lb 14.5 oz), General Appearance: Well nourished, well developed, in no acute distress.  Neurological: Mood & affect are normal.  Shoulder exam: left  Tenderness: globally  ROM: forward flexion 120 / 120, extension 70 / 70, full abduction 110/110, abduction - glenohumeral 70 / 70, external rotation 30 / 30, internal rotation mid sagittal on  Shoulder Strength: biceps 5/5, triceps 5/5, abduction 5/5, adduction 5/5, external rotation 5/5 with shoulder at side, flexion 5/5, and extension 5/5  positive for tenderness about the glenohumeral joint, positive for tenderness over the acromioclavicular joint and positive for impingement sign  Stability tests: anterior apprehension test positive for pain only and posterior apprehension test positive for pain only                   RADIOGRAPHS:  Review of MRI demonstrates  1. Moderate supraspinatus tendinosis with a small low-grade partial-thickness articular surface tear of the anterior leading edge fibers.  2. Infraspinatus tendinosis without partial or full-thickness tear.  3. Biceps tendinosis.  4. Marked synovitis at the rotator interval.  5. Subacromial/subdeltoid bursitis.  6. Moderate AC joint " hypertrophy with periarticular edema.    ASSESSMENT/PLAN:     Plan: We discussed with the patient at length all the different treatment options available for her leftshoulder including anti-inflammatories, acetaminophen, rest, ice, Physical therapy to include strengthening exercise, occasional cortisone injections for temporary relief, arthroscopic surgical repair, and finally shoulder arthroplasty.   Patient will begin physical therapy at this time to try to relieve her symptoms she will continue to work with her blood sugars to try to get below 8.5 so we may proceed with injection therapy.  She is currently at 9.2

## 2019-02-13 ENCOUNTER — LAB VISIT (OUTPATIENT)
Dept: LAB | Facility: HOSPITAL | Age: 69
End: 2019-02-13
Attending: INTERNAL MEDICINE
Payer: MEDICARE

## 2019-02-13 ENCOUNTER — OFFICE VISIT (OUTPATIENT)
Dept: PHYSICAL MEDICINE AND REHAB | Facility: CLINIC | Age: 69
End: 2019-02-13
Payer: MEDICARE

## 2019-02-13 ENCOUNTER — OFFICE VISIT (OUTPATIENT)
Dept: INTERNAL MEDICINE | Facility: CLINIC | Age: 69
End: 2019-02-13
Payer: MEDICARE

## 2019-02-13 ENCOUNTER — OFFICE VISIT (OUTPATIENT)
Dept: ORTHOPEDICS | Facility: CLINIC | Age: 69
End: 2019-02-13
Payer: MEDICARE

## 2019-02-13 VITALS
HEART RATE: 89 BPM | SYSTOLIC BLOOD PRESSURE: 130 MMHG | HEIGHT: 65 IN | DIASTOLIC BLOOD PRESSURE: 68 MMHG | BODY MASS INDEX: 48.15 KG/M2 | WEIGHT: 289 LBS

## 2019-02-13 VITALS
WEIGHT: 289 LBS | HEIGHT: 65 IN | DIASTOLIC BLOOD PRESSURE: 70 MMHG | BODY MASS INDEX: 48.15 KG/M2 | SYSTOLIC BLOOD PRESSURE: 127 MMHG | HEART RATE: 79 BPM

## 2019-02-13 VITALS
DIASTOLIC BLOOD PRESSURE: 68 MMHG | HEART RATE: 89 BPM | BODY MASS INDEX: 48.15 KG/M2 | SYSTOLIC BLOOD PRESSURE: 130 MMHG | WEIGHT: 289 LBS | HEIGHT: 65 IN

## 2019-02-13 DIAGNOSIS — E11.22 TYPE 2 DM WITH CKD STAGE 4 AND HYPERTENSION: ICD-10-CM

## 2019-02-13 DIAGNOSIS — E78.5 DYSLIPIDEMIA: Chronic | ICD-10-CM

## 2019-02-13 DIAGNOSIS — N18.4 TYPE 2 DM WITH CKD STAGE 4 AND HYPERTENSION: ICD-10-CM

## 2019-02-13 DIAGNOSIS — Z86.73 HISTORY OF STROKE: ICD-10-CM

## 2019-02-13 DIAGNOSIS — N18.4 ANEMIA OF CHRONIC RENAL FAILURE, STAGE 4 (SEVERE): ICD-10-CM

## 2019-02-13 DIAGNOSIS — E55.9 VITAMIN D INSUFFICIENCY: ICD-10-CM

## 2019-02-13 DIAGNOSIS — I77.819 ECTATIC AORTA: ICD-10-CM

## 2019-02-13 DIAGNOSIS — G89.29 CHRONIC LOW BACK PAIN WITH SCIATICA, SCIATICA LATERALITY UNSPECIFIED, UNSPECIFIED BACK PAIN LATERALITY: Primary | ICD-10-CM

## 2019-02-13 DIAGNOSIS — I12.9 TYPE 2 DM WITH CKD STAGE 4 AND HYPERTENSION: ICD-10-CM

## 2019-02-13 DIAGNOSIS — H26.493 PCO (POSTERIOR CAPSULAR OPACIFICATION), BILATERAL: ICD-10-CM

## 2019-02-13 DIAGNOSIS — G63 POLYNEUROPATHY ASSOCIATED WITH UNDERLYING DISEASE: ICD-10-CM

## 2019-02-13 DIAGNOSIS — E11.43 DIABETIC AUTONOMIC NEUROPATHY ASSOCIATED WITH TYPE 2 DIABETES MELLITUS: ICD-10-CM

## 2019-02-13 DIAGNOSIS — M47.816 LUMBAR FACET ARTHROPATHY: ICD-10-CM

## 2019-02-13 DIAGNOSIS — E66.9 OBESITY, DIABETES, AND HYPERTENSION SYNDROME: ICD-10-CM

## 2019-02-13 DIAGNOSIS — E11.59 OBESITY, DIABETES, AND HYPERTENSION SYNDROME: ICD-10-CM

## 2019-02-13 DIAGNOSIS — D63.1 ANEMIA OF CHRONIC RENAL FAILURE, STAGE 4 (SEVERE): ICD-10-CM

## 2019-02-13 DIAGNOSIS — I73.9 PAD (PERIPHERAL ARTERY DISEASE): Chronic | ICD-10-CM

## 2019-02-13 DIAGNOSIS — I15.2 OBESITY, DIABETES, AND HYPERTENSION SYNDROME: ICD-10-CM

## 2019-02-13 DIAGNOSIS — M54.40 CHRONIC LOW BACK PAIN WITH SCIATICA, SCIATICA LATERALITY UNSPECIFIED, UNSPECIFIED BACK PAIN LATERALITY: Primary | ICD-10-CM

## 2019-02-13 DIAGNOSIS — G47.33 OBSTRUCTIVE SLEEP APNEA: Chronic | ICD-10-CM

## 2019-02-13 DIAGNOSIS — R26.81 GAIT INSTABILITY: ICD-10-CM

## 2019-02-13 DIAGNOSIS — I10 ESSENTIAL HYPERTENSION: ICD-10-CM

## 2019-02-13 DIAGNOSIS — E66.01 MORBID OBESITY WITH BMI OF 45.0-49.9, ADULT: ICD-10-CM

## 2019-02-13 DIAGNOSIS — M25.312 INSTABILITY OF LEFT SHOULDER JOINT: Primary | ICD-10-CM

## 2019-02-13 DIAGNOSIS — M48.061 SPINAL STENOSIS OF LUMBAR REGION, UNSPECIFIED WHETHER NEUROGENIC CLAUDICATION PRESENT: ICD-10-CM

## 2019-02-13 DIAGNOSIS — M25.561 ACUTE PAIN OF RIGHT KNEE: ICD-10-CM

## 2019-02-13 DIAGNOSIS — M17.11 PRIMARY OSTEOARTHRITIS OF RIGHT KNEE: ICD-10-CM

## 2019-02-13 DIAGNOSIS — M1A.00X0 IDIOPATHIC CHRONIC GOUT WITHOUT TOPHUS, UNSPECIFIED SITE: ICD-10-CM

## 2019-02-13 DIAGNOSIS — E11.69 OBESITY, DIABETES, AND HYPERTENSION SYNDROME: ICD-10-CM

## 2019-02-13 DIAGNOSIS — M48.062 SPINAL STENOSIS OF LUMBAR REGION WITH NEUROGENIC CLAUDICATION: ICD-10-CM

## 2019-02-13 DIAGNOSIS — K21.9 GASTROESOPHAGEAL REFLUX DISEASE WITHOUT ESOPHAGITIS: ICD-10-CM

## 2019-02-13 DIAGNOSIS — N18.4 STAGE 4 CHRONIC KIDNEY DISEASE: ICD-10-CM

## 2019-02-13 DIAGNOSIS — D50.9 IRON DEFICIENCY ANEMIA, UNSPECIFIED IRON DEFICIENCY ANEMIA TYPE: ICD-10-CM

## 2019-02-13 DIAGNOSIS — Z79.4 TYPE 2 DIABETES MELLITUS WITH DIABETIC POLYNEUROPATHY, WITH LONG-TERM CURRENT USE OF INSULIN: ICD-10-CM

## 2019-02-13 DIAGNOSIS — M47.22 OSTEOARTHRITIS OF SPINE WITH RADICULOPATHY, CERVICAL REGION: ICD-10-CM

## 2019-02-13 DIAGNOSIS — N25.81 SECONDARY HYPERPARATHYROIDISM: ICD-10-CM

## 2019-02-13 DIAGNOSIS — E66.01 MORBID OBESITY DUE TO EXCESS CALORIES: Chronic | ICD-10-CM

## 2019-02-13 DIAGNOSIS — E11.42 TYPE 2 DIABETES MELLITUS WITH DIABETIC POLYNEUROPATHY, WITH LONG-TERM CURRENT USE OF INSULIN: ICD-10-CM

## 2019-02-13 DIAGNOSIS — I51.89 DIASTOLIC DYSFUNCTION: Chronic | ICD-10-CM

## 2019-02-13 DIAGNOSIS — Z00.00 ENCOUNTER FOR PREVENTIVE HEALTH EXAMINATION: Primary | ICD-10-CM

## 2019-02-13 PROBLEM — M10.9 GOUT OF ELBOW: Status: RESOLVED | Noted: 2018-07-11 | Resolved: 2019-02-13

## 2019-02-13 LAB
25(OH)D3+25(OH)D2 SERPL-MCNC: 40 NG/ML
ALBUMIN SERPL BCP-MCNC: 4 G/DL
ANION GAP SERPL CALC-SCNC: 10 MMOL/L
BASOPHILS # BLD AUTO: 0.03 K/UL
BASOPHILS NFR BLD: 0.5 %
BUN SERPL-MCNC: 38 MG/DL
CALCIUM SERPL-MCNC: 10 MG/DL
CHLORIDE SERPL-SCNC: 104 MMOL/L
CO2 SERPL-SCNC: 25 MMOL/L
CREAT SERPL-MCNC: 2 MG/DL
DIFFERENTIAL METHOD: ABNORMAL
EOSINOPHIL # BLD AUTO: 0.4 K/UL
EOSINOPHIL NFR BLD: 6.4 %
ERYTHROCYTE [DISTWIDTH] IN BLOOD BY AUTOMATED COUNT: 14.4 %
EST. GFR  (AFRICAN AMERICAN): 28.7 ML/MIN/1.73 M^2
EST. GFR  (NON AFRICAN AMERICAN): 24.9 ML/MIN/1.73 M^2
FERRITIN SERPL-MCNC: 180 NG/ML
GLUCOSE SERPL-MCNC: 229 MG/DL
HCT VFR BLD AUTO: 30.7 %
HGB BLD-MCNC: 9.8 G/DL
IMM GRANULOCYTES # BLD AUTO: 0.02 K/UL
IMM GRANULOCYTES NFR BLD AUTO: 0.3 %
IRON SERPL-MCNC: 41 UG/DL
LYMPHOCYTES # BLD AUTO: 2 K/UL
LYMPHOCYTES NFR BLD: 34.8 %
MCH RBC QN AUTO: 29 PG
MCHC RBC AUTO-ENTMCNC: 31.9 G/DL
MCV RBC AUTO: 91 FL
MONOCYTES # BLD AUTO: 0.6 K/UL
MONOCYTES NFR BLD: 10.1 %
NEUTROPHILS # BLD AUTO: 2.7 K/UL
NEUTROPHILS NFR BLD: 47.9 %
NRBC BLD-RTO: 0 /100 WBC
PHOSPHATE SERPL-MCNC: 3.9 MG/DL
PLATELET # BLD AUTO: 243 K/UL
PMV BLD AUTO: 10.6 FL
POTASSIUM SERPL-SCNC: 4.9 MMOL/L
PTH-INTACT SERPL-MCNC: 82 PG/ML
RBC # BLD AUTO: 3.38 M/UL
SATURATED IRON: 11 %
SODIUM SERPL-SCNC: 139 MMOL/L
TOTAL IRON BINDING CAPACITY: 363 UG/DL
TRANSFERRIN SERPL-MCNC: 245 MG/DL
WBC # BLD AUTO: 5.74 K/UL

## 2019-02-13 PROCEDURE — 99999 PR PBB SHADOW E&M-EST. PATIENT-LVL III: ICD-10-PCS | Mod: PBBFAC,,, | Performed by: PHYSICAL MEDICINE & REHABILITATION

## 2019-02-13 PROCEDURE — 83540 ASSAY OF IRON: CPT

## 2019-02-13 PROCEDURE — G0439 PR MEDICARE ANNUAL WELLNESS SUBSEQUENT VISIT: ICD-10-PCS | Mod: S$GLB,,, | Performed by: NURSE PRACTITIONER

## 2019-02-13 PROCEDURE — 99999 PR PBB SHADOW E&M-EST. PATIENT-LVL III: CPT | Mod: PBBFAC,,, | Performed by: PHYSICAL MEDICINE & REHABILITATION

## 2019-02-13 PROCEDURE — 83970 ASSAY OF PARATHORMONE: CPT

## 2019-02-13 PROCEDURE — 99214 PR OFFICE/OUTPT VISIT, EST, LEVL IV, 30-39 MIN: ICD-10-PCS | Mod: S$PBB,,, | Performed by: PHYSICAL MEDICINE & REHABILITATION

## 2019-02-13 PROCEDURE — 99215 OFFICE O/P EST HI 40 MIN: CPT | Mod: PBBFAC | Performed by: PHYSICIAN ASSISTANT

## 2019-02-13 PROCEDURE — 85025 COMPLETE CBC W/AUTO DIFF WBC: CPT

## 2019-02-13 PROCEDURE — 99999 PR PBB SHADOW E&M-EST. PATIENT-LVL V: CPT | Mod: PBBFAC,,, | Performed by: PHYSICIAN ASSISTANT

## 2019-02-13 PROCEDURE — 99499 NO LOS: ICD-10-PCS | Mod: S$PBB,,, | Performed by: PHYSICIAN ASSISTANT

## 2019-02-13 PROCEDURE — 99215 OFFICE O/P EST HI 40 MIN: CPT | Mod: PBBFAC,27 | Performed by: NURSE PRACTITIONER

## 2019-02-13 PROCEDURE — 99999 PR PBB SHADOW E&M-EST. PATIENT-LVL V: ICD-10-PCS | Mod: PBBFAC,,, | Performed by: NURSE PRACTITIONER

## 2019-02-13 PROCEDURE — 82728 ASSAY OF FERRITIN: CPT

## 2019-02-13 PROCEDURE — 99213 OFFICE O/P EST LOW 20 MIN: CPT | Mod: PBBFAC,27 | Performed by: PHYSICAL MEDICINE & REHABILITATION

## 2019-02-13 PROCEDURE — 82306 VITAMIN D 25 HYDROXY: CPT

## 2019-02-13 PROCEDURE — 99999 PR PBB SHADOW E&M-EST. PATIENT-LVL V: ICD-10-PCS | Mod: PBBFAC,,, | Performed by: PHYSICIAN ASSISTANT

## 2019-02-13 PROCEDURE — 99499 UNLISTED E&M SERVICE: CPT | Mod: S$PBB,,, | Performed by: PHYSICIAN ASSISTANT

## 2019-02-13 PROCEDURE — 99214 OFFICE O/P EST MOD 30 MIN: CPT | Mod: S$PBB,,, | Performed by: PHYSICAL MEDICINE & REHABILITATION

## 2019-02-13 PROCEDURE — G0439 PPPS, SUBSEQ VISIT: HCPCS | Mod: S$GLB,,, | Performed by: NURSE PRACTITIONER

## 2019-02-13 PROCEDURE — 99999 PR PBB SHADOW E&M-EST. PATIENT-LVL V: CPT | Mod: PBBFAC,,, | Performed by: NURSE PRACTITIONER

## 2019-02-13 PROCEDURE — 80069 RENAL FUNCTION PANEL: CPT

## 2019-02-13 RX ORDER — HYDROCODONE BITARTRATE AND ACETAMINOPHEN 10; 325 MG/1; MG/1
1 TABLET ORAL EVERY 6 HOURS PRN
Qty: 90 TABLET | Refills: 0 | Status: ON HOLD | OUTPATIENT
Start: 2019-04-13 | End: 2019-05-25 | Stop reason: HOSPADM

## 2019-02-13 RX ORDER — DULOXETIN HYDROCHLORIDE 30 MG/1
30 CAPSULE, DELAYED RELEASE ORAL DAILY
Qty: 90 CAPSULE | Refills: 3 | Status: SHIPPED | OUTPATIENT
Start: 2019-02-13 | End: 2019-06-04 | Stop reason: SDUPTHER

## 2019-02-13 RX ORDER — HYDROCODONE BITARTRATE AND ACETAMINOPHEN 10; 325 MG/1; MG/1
1 TABLET ORAL EVERY 6 HOURS PRN
Qty: 90 TABLET | Refills: 0 | Status: SHIPPED | OUTPATIENT
Start: 2019-02-13 | End: 2019-03-15

## 2019-02-13 RX ORDER — PREGABALIN 100 MG/1
CAPSULE ORAL
Qty: 180 CAPSULE | Refills: 1 | Status: SHIPPED | OUTPATIENT
Start: 2019-02-13 | End: 2019-06-13

## 2019-02-13 RX ORDER — HYDROCODONE BITARTRATE AND ACETAMINOPHEN 10; 325 MG/1; MG/1
1 TABLET ORAL EVERY 6 HOURS PRN
Qty: 90 TABLET | Refills: 0 | Status: SHIPPED | OUTPATIENT
Start: 2019-04-13 | End: 2019-03-07 | Stop reason: SDUPTHER

## 2019-02-13 NOTE — PROGRESS NOTES
Patient is here today to undergo cortisone injection of her shoulder on a at her last visit we had opted to plan to move forward with injections assuming her A1c was down.  To date she has not had her of blood work repeated.  Patient was advised to report to the lab from here and get her blood drawn I will receive the results tonight contact her tomorrow if we can move forward with injections.  This will be a no charge visit

## 2019-02-13 NOTE — PROGRESS NOTES
"Subjective:       Patient ID: Annika Mac is a 68 y.o. female.    Chief Complaint: Back Pain    Back Pain   Associated symptoms include leg pain. Pertinent negatives include no abdominal pain, chest pain, fever, numbness or weakness. Headaches:     Elbow Pain   Pertinent negatives include no abdominal pain, arthralgias, chest pain, chills, fatigue, fever, joint swelling, myalgias, numbness, rash or weakness. Headaches:     Arm Pain    Pertinent negatives include no chest pain or numbness.   Knee Pain    Pertinent negatives include no numbness.   Leg Pain    Pertinent negatives include no numbness.   Patient is 69 y/o female , who returns to clinic for chronic back and leg pain.   LCV 10/11//18.    Today, main pains are:    #1 back pain, leg pain,   #2 Left shoulder pain ( Ortho f/u, and pt is going to PT for shoulder).     Current back pain is  3,  worst pain is 7/10, the best is 3-4 with meds  #1 back pain, leg pain,   Current back pain is 0,  worst pain are7/10, best is 3-4.  Pain is present at all time, accross lower back in midline of tail bone.   Back pain is more dull pain, and leg pain is more shooting pain art the top of thighs, and bellow the knee.   Pain is present daytime, and radiates to both legs on front of legs.  Cannot walk more than 50 ft nor she can stand straight > 5 minutes.   She has to lean forward even with cane or during walking, needs to sit down to rest.  Back pain increases, and leg become weak.   She has diabetic neuropathy in both feet, top and bottom, tingling pain.  Does not have "charilie horses".   Sitting down helps a little bit, and lying down  ( on side, or stomach).   She failed Neurontin 300 mg  for maybe 6 yrs, and takes 3 pills a day, that does not help much.   And , Gabapentin is eventually changed to Lyrica 100 mg that she takes daily (approved by insurance) , and pain in hands is gone. Tolerates Lyrica well.   She has been getting NAYLA since 2011,by Dr. Quinn and Geronimo " with NAYLA, with some pain relief.   She had a second B/l L5/S1 TF NAYLA on 16, and she reported that helped a lot > 80% and lasted for only 2 weeks.   First  B/l L5/S1 TF NAYLA 16, with  > 50% pain improvement.  On LCV, she was given Hydrocodone , and that in combination with Gabapentin, helped greatly, decreases pain to tolerable 2-3.  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   She is using for gait: SC, manual WC, and RW.  Here for follow up, and treatment.     Past Medical History:   Diagnosis Date    Allergy     Anemia 2012    Arthritis     CHF (congestive heart failure)     CKD (chronic kidney disease) stage 3, GFR 30-59 ml/min 2012    Clotting disorder     Colon polyp     Deep vein thrombophlebitis of left leg 2012    Degenerative disc disease     Diabetic peripheral neuropathy associated with type 2 diabetes mellitus 2012    GERD (gastroesophageal reflux disease)     HTN (hypertension) 2012    Hyperlipidemia 2012    Lead-induced gout of ankle or foot     right going on three weeks    Nonproliferative diabetic retinopathy of right eye 2012    Obstructive sleep apnea 2012    Osteoporosis     Proliferative diabetic retinopathy of left eye 2012    Stroke 2003    Type 2 diabetes mellitus with diabetic polyneuropathy 2012    Ulcer        Past Surgical History:   Procedure Laterality Date    CATARACT EXTRACTION W/  INTRAOCULAR LENS IMPLANT Left 3/2002    left     CATARACT EXTRACTION W/  INTRAOCULAR LENS IMPLANT Right     OD dr. olivares     SECTION      COLONOSCOPY N/A 2018    [facility] (2ND FLR)    CYST REMOVAL  2011    left side of face    ESOPHAGOGASTRODUODENOSCOPY (EGD) N/A 2018    Performed by [facility] at [facility] END[facility] (2ND FLR)    EYE SURGERY Bilateral 2012    eye implants    GANGLION CYST  EXCISION  11/13/2007    Right wrist    INSERTION, IOL PROSTHESIS Right 12/19/2012    Performed by Linda [facility] at [facility] OR 1ST FLR    Pan Retinal Photocoagulation Bilateral     Dr. Monterroso (Proliferative Diabetic Retinopathy)    PHACOEMULSIFICATION, CATARACT Right 12/19/2012    Performed [facility] at [facility] OR 1ST FLR    TOTAL ABDOMINAL HYSTERECTOMY  1982    TOTAL KNEE ARTHROPLASTY  2/2006    left    TRIGGER FINGER RELEASE  9/18/2009       Family History   Problem Relation Age of Onset    Diabetes Mother     Hypertension Mother     Heart disease Father     Diabetes Sister     Thyroid disease Brother     Diabetes Brother     Hypertension Brother     No Known Problems Daughter     No Known Problems Son     No Known Problems Daughter     Amblyopia Neg Hx     Blindness Neg Hx     Glaucoma Neg Hx     Macular degeneration Neg Hx     Retinal detachment Neg Hx     Strabismus Neg Hx     Colon cancer Neg Hx     Esophageal cancer Neg Hx        Social History     Socioeconomic History    Marital status:      Spouse name: Heber    Number of children: 3    Years of education: None    Highest education level: None   Social Needs    Financial resource strain: None    Food insecurity - worry: None    Food insecurity - inability: None    Transportation needs - medical: None    Transportation needs - non-medical: None   Occupational History    None   Tobacco Use    Smoking status: Never Smoker    Smokeless tobacco: Never Used   Substance and Sexual Activity    Alcohol use: No    Drug use: No    Sexual activity: Yes     Partners: Male   Other Topics Concern    None   Social History Narrative    , lives with family; 3 children, 2 grandchildren       Current Outpatient Medications   Medication Sig Dispense Refill    allopurinol (ZYLOPRIM) 100 MG tablet Take 3 tablets (300 mg total) by mouth once daily. 270 tablet 3    amLODIPine (NORVASC) 5 MG tablet Take 1  "tablet (5 mg total) by mouth once daily. 90 tablet 1    aspirin 81 MG Chew Take 1 tablet (81 mg total) by mouth once daily.  0    BD ULTRA-FINE KIKA PEN NEEDLE 32 gauge x 5/32" Ndle To use 4 times daily 200 each 20    blood-glucose meter kit Use as instructed, true test/result meter 1 each 0    clobetasol (TEMOVATE) 0.05 % cream Apply topically 2 (two) times daily as needed. 30 g 2    DULoxetine (CYMBALTA) 30 MG capsule Take 1 capsule (30 mg total) by mouth once daily. 90 capsule 3    famotidine (PEPCID) 20 MG tablet Take 1 tablet (20 mg total) by mouth 2 (two) times daily. 1 tablet 0    fexofenadine (ALLEGRA) 180 MG tablet TAKE 1 TABLET BY MOUTH ONCE DAILY 30 tablet 0    fluocinonide (LIDEX) 0.05 % external solution AAA scalp qday - bid prn pruritus 60 mL 3    furosemide (LASIX) 20 MG tablet Take 1 tablet (20 mg total) by mouth once daily. Take an extra pill if short of breath or leg swelling 120 tablet 3    HYDROcodone-acetaminophen (NORCO)  mg per tablet Take 1 tablet by mouth every 6 (six) hours as needed for Pain. 90 tablet 0    [START ON 4/13/2019] HYDROcodone-acetaminophen (NORCO)  mg per tablet Take 1 tablet by mouth every 6 (six) hours as needed for Pain. 90 tablet 0    [START ON 4/13/2019] HYDROcodone-acetaminophen (NORCO)  mg per tablet Take 1 tablet by mouth every 6 (six) hours as needed for Pain. 90 tablet 0    insulin regular hum U-500 conc (HUMULIN R U-500, CONC, KWIKPEN) 500 unit/mL (3 mL) InPn Inject  Units into the skin 3 (three) times daily before meals. Take 115u with breakfast,85u with lunch, and 50u with dinner. (Patient taking differently: Inject  Units into the skin 3 (three) times daily before meals. Take 115u with breakfast,140 with large and 120u with small.  100 unit with small 90 with large. Supper 65 large 50 small.) 11 Syringe 4    irbesartan (AVAPRO) 300 MG tablet Take 0.5 tablets (150 mg total) by mouth every evening. 90 tablet 3    " ketoconazole (NIZORAL) 2 % shampoo Wash hair with medicated shampoo at least 2x/week - let sit on scalp at least 5 minutes prior to rinsing 120 mL 5    labetalol (NORMODYNE) 300 MG tablet Take 1 tablet (300 mg total) by mouth 2 (two) times daily. 180 tablet 3    lancets 31 gauge Misc 1 lancet by Misc.(Non-Drug; Combo Route) route 4 (four) times daily. 150 each 6    liraglutide 0.6 mg/0.1 mL, 18 mg/3 mL, subq PNIJ 0.6 mg/0.1 mL (18 mg/3 mL) PnIj Inject 0.6 mg daily into the skin for 2 weeks then increase to 1.2 mg thereafter daily. 3 mL 11    pregabalin (LYRICA) 100 MG capsule TAKE 1 CAPSULE BY MOUTH TWO TIMES DAILY 180 capsule 1    simvastatin (ZOCOR) 40 MG tablet Take 0.5 tablets (20 mg total) by mouth every evening. 90 tablet 1    spironolactone (ALDACTONE) 25 MG tablet Take 0.5 tablets (12.5 mg total) by mouth once daily. 90 tablet 2    TRUETEST TEST STRIPS Strp 1 strip by Misc.(Non-Drug; Combo Route) route 4 (four) times daily. 400 strip 4     No current facility-administered medications for this visit.        Review of patient's allergies indicates:   Allergen Reactions    Iodinated contrast media - oral and iv dye Rash     Review of Systems   Constitutional: Negative.  Negative for appetite change, chills, fatigue, fever and unexpected weight change.   HENT: Negative.  Negative for drooling, trouble swallowing and voice change.    Eyes: Negative.  Negative for pain and visual disturbance.   Respiratory: Negative.  Negative for shortness of breath and wheezing.    Cardiovascular: Negative.  Negative for chest pain and palpitations.   Gastrointestinal: Negative.  Negative for abdominal distention, abdominal pain, constipation and diarrhea.   Genitourinary: Negative.  Negative for difficulty urinating.   Musculoskeletal: Positive for back pain. Negative for arthralgias, gait problem, joint swelling, myalgias and neck stiffness.               Skin: Negative.  Negative for color change and rash.    Neurological: Negative.  Negative for dizziness, facial asymmetry, speech difficulty, weakness, light-headedness and numbness. Headaches:     Hematological: Negative for adenopathy.   Psychiatric/Behavioral: Negative.  Negative for behavioral problems, confusion and sleep disturbance. The patient is not nervous/anxious.          Objective:      Physical Exam    GENERAL: The patient is alert, oriented, pleasant.   HEENT; PERRLA  NECK: supple   MUSCULOSKELETAL:   Gait: slow james , on wide basis, using RW.   Cervical spine :  Minimally decreased AROM in cervical spine, flexion to 60, extension to  0, side bending and rotation  to 30-35 degrees without pain.  No paravertebral cervical musculature tenderness    No  tenderness in upper trapezius muscles    Lumbar spine, full range of motion in all planes, flexion to 90 degrees , ext. 0.  Side bending and rotation to 35-40 degrees.   Straight leg raising Negative bilaterally.   Full range of motion in all joints x4 extremities, except in RT knee, that is very painful at pattellar lower pole/border.  Appears high riding patella, hat is maybe  lateray displaced,  Muscle strength 5/5 throughout x4 extremities.   No  joint laxity throughout x4 extremities.   NEUROLOGIC: Cranial nerves II through XII intact.   Deep tendon reflexes is normal, +2 in the upper and lower extremities bilaterally.   Muscle tone is normal.   Sensory is intact to light touch and pinprick throughout x4 extremities.     MRI of Lumbar spine showed:  T12-L1:  There is no focal disc herniation.  No significant spinal canal narrowing.    No significant neural foraminal narrowing.  L1-2:  There is no focal disc herniation.  No significant spinal canal narrowing.    No significant neural foraminal narrowing.  L2-3:  There is no focal disc herniation.  No significant spinal canal narrowing.    No significant neural foraminal narrowing.  L3-4:  There is a minimal circumferential disk bulge and mild  bilateral facet arthropathy which results in no significant spinal canal or neural foraminal narrowing.  L4-5:    There is circumferential disk bulge (eccentric to the left), moderate bilateral hypertrophic facet arthropathy, and ligamentum flavum thickening which results in mild spinal canal narrowing and no significant neural foraminal narrowing.      L5-S1:  There is circumferential disk bulge and severe hypertrophic facet arthropathy   with ligamentum flavum hypertrophy which results in mild spinal canal narrowing, moderate left neural foraminal narrowing, and mild right neural foraminal narrowing.i  Impression:   multilevel degenerative changes of the lumbar spine consisting of circumferential disk bulges, hypertrophic facet arthropathy, and ligamentum flavum hypertrophy   which result in mild spinal canal narrowing at L4-5  and L5-S1 as well as moderate left and mild right neural foraminal narrowing at L5-S1.      Assessment:       1. Chronic low back pain with sciatica, sciatica laterality unspecified, unspecified back pain laterality    2. Spinal stenosis of lumbar region with neurogenic claudication    3. Lumbar facet arthropathy    4. Diabetic autonomic neuropathy associated with type 2 diabetes mellitus    5. Gait instability    6. Spinal stenosis of lumbar region, unspecified whether neurogenic claudication present    7. PCO (posterior capsular opacification), bilateral    8. Acute pain of right knee    9. Primary osteoarthritis of right knee    10. Polyneuropathy associated with underlying disease    11. Osteoarthritis of spine with radiculopathy, cervical region    12. History of stroke      Plan:       Chronic low back pain with sciatica, sciatica laterality unspecified, unspecified back pain laterality  -     pregabalin (LYRICA) 100 MG capsule; TAKE 1 CAPSULE BY MOUTH TWO TIMES DAILY  Dispense: 180 capsule; Refill: 1  -     DULoxetine (CYMBALTA) 30 MG capsule; Take 1 capsule (30 mg total) by mouth  once daily.  Dispense: 90 capsule; Refill: 3  -     HYDROcodone-acetaminophen (NORCO)  mg per tablet; Take 1 tablet by mouth every 6 (six) hours as needed for Pain.  Dispense: 90 tablet; Refill: 0  -     HYDROcodone-acetaminophen (NORCO)  mg per tablet; Take 1 tablet by mouth every 6 (six) hours as needed for Pain.  Dispense: 90 tablet; Refill: 0  -     HYDROcodone-acetaminophen (NORCO)  mg per tablet; Take 1 tablet by mouth every 6 (six) hours as needed for Pain.  Dispense: 90 tablet; Refill: 0    Spinal stenosis of lumbar region with neurogenic claudication  -     pregabalin (LYRICA) 100 MG capsule; TAKE 1 CAPSULE BY MOUTH TWO TIMES DAILY  Dispense: 180 capsule; Refill: 1  -     HYDROcodone-acetaminophen (NORCO)  mg per tablet; Take 1 tablet by mouth every 6 (six) hours as needed for Pain.  Dispense: 90 tablet; Refill: 0  -     HYDROcodone-acetaminophen (NORCO)  mg per tablet; Take 1 tablet by mouth every 6 (six) hours as needed for Pain.  Dispense: 90 tablet; Refill: 0  -     HYDROcodone-acetaminophen (NORCO)  mg per tablet; Take 1 tablet by mouth every 6 (six) hours as needed for Pain.  Dispense: 90 tablet; Refill: 0    Lumbar facet arthropathy  -     pregabalin (LYRICA) 100 MG capsule; TAKE 1 CAPSULE BY MOUTH TWO TIMES DAILY  Dispense: 180 capsule; Refill: 1  -     DULoxetine (CYMBALTA) 30 MG capsule; Take 1 capsule (30 mg total) by mouth once daily.  Dispense: 90 capsule; Refill: 3  -     HYDROcodone-acetaminophen (NORCO)  mg per tablet; Take 1 tablet by mouth every 6 (six) hours as needed for Pain.  Dispense: 90 tablet; Refill: 0  -     HYDROcodone-acetaminophen (NORCO)  mg per tablet; Take 1 tablet by mouth every 6 (six) hours as needed for Pain.  Dispense: 90 tablet; Refill: 0  -     HYDROcodone-acetaminophen (NORCO)  mg per tablet; Take 1 tablet by mouth every 6 (six) hours as needed for Pain.  Dispense: 90 tablet; Refill: 0    Diabetic autonomic  neuropathy associated with type 2 diabetes mellitus  -     pregabalin (LYRICA) 100 MG capsule; TAKE 1 CAPSULE BY MOUTH TWO TIMES DAILY  Dispense: 180 capsule; Refill: 1  -     DULoxetine (CYMBALTA) 30 MG capsule; Take 1 capsule (30 mg total) by mouth once daily.  Dispense: 90 capsule; Refill: 3  -     HYDROcodone-acetaminophen (NORCO)  mg per tablet; Take 1 tablet by mouth every 6 (six) hours as needed for Pain.  Dispense: 90 tablet; Refill: 0  -     HYDROcodone-acetaminophen (NORCO)  mg per tablet; Take 1 tablet by mouth every 6 (six) hours as needed for Pain.  Dispense: 90 tablet; Refill: 0  -     HYDROcodone-acetaminophen (NORCO)  mg per tablet; Take 1 tablet by mouth every 6 (six) hours as needed for Pain.  Dispense: 90 tablet; Refill: 0    Gait instability  -     pregabalin (LYRICA) 100 MG capsule; TAKE 1 CAPSULE BY MOUTH TWO TIMES DAILY  Dispense: 180 capsule; Refill: 1  -     HYDROcodone-acetaminophen (NORCO)  mg per tablet; Take 1 tablet by mouth every 6 (six) hours as needed for Pain.  Dispense: 90 tablet; Refill: 0  -     HYDROcodone-acetaminophen (NORCO)  mg per tablet; Take 1 tablet by mouth every 6 (six) hours as needed for Pain.  Dispense: 90 tablet; Refill: 0  -     HYDROcodone-acetaminophen (NORCO)  mg per tablet; Take 1 tablet by mouth every 6 (six) hours as needed for Pain.  Dispense: 90 tablet; Refill: 0    Spinal stenosis of lumbar region, unspecified whether neurogenic claudication present  -     pregabalin (LYRICA) 100 MG capsule; TAKE 1 CAPSULE BY MOUTH TWO TIMES DAILY  Dispense: 180 capsule; Refill: 1  -     DULoxetine (CYMBALTA) 30 MG capsule; Take 1 capsule (30 mg total) by mouth once daily.  Dispense: 90 capsule; Refill: 3  -     HYDROcodone-acetaminophen (NORCO)  mg per tablet; Take 1 tablet by mouth every 6 (six) hours as needed for Pain.  Dispense: 90 tablet; Refill: 0  -     HYDROcodone-acetaminophen (NORCO)  mg per tablet; Take 1 tablet  by mouth every 6 (six) hours as needed for Pain.  Dispense: 90 tablet; Refill: 0  -     HYDROcodone-acetaminophen (NORCO)  mg per tablet; Take 1 tablet by mouth every 6 (six) hours as needed for Pain.  Dispense: 90 tablet; Refill: 0    PCO (posterior capsular opacification), bilateral  -     pregabalin (LYRICA) 100 MG capsule; TAKE 1 CAPSULE BY MOUTH TWO TIMES DAILY  Dispense: 180 capsule; Refill: 1  -     DULoxetine (CYMBALTA) 30 MG capsule; Take 1 capsule (30 mg total) by mouth once daily.  Dispense: 90 capsule; Refill: 3  -     HYDROcodone-acetaminophen (NORCO)  mg per tablet; Take 1 tablet by mouth every 6 (six) hours as needed for Pain.  Dispense: 90 tablet; Refill: 0  -     HYDROcodone-acetaminophen (NORCO)  mg per tablet; Take 1 tablet by mouth every 6 (six) hours as needed for Pain.  Dispense: 90 tablet; Refill: 0  -     HYDROcodone-acetaminophen (NORCO)  mg per tablet; Take 1 tablet by mouth every 6 (six) hours as needed for Pain.  Dispense: 90 tablet; Refill: 0    Acute pain of right knee  -     pregabalin (LYRICA) 100 MG capsule; TAKE 1 CAPSULE BY MOUTH TWO TIMES DAILY  Dispense: 180 capsule; Refill: 1  -     DULoxetine (CYMBALTA) 30 MG capsule; Take 1 capsule (30 mg total) by mouth once daily.  Dispense: 90 capsule; Refill: 3  -     HYDROcodone-acetaminophen (NORCO)  mg per tablet; Take 1 tablet by mouth every 6 (six) hours as needed for Pain.  Dispense: 90 tablet; Refill: 0  -     HYDROcodone-acetaminophen (NORCO)  mg per tablet; Take 1 tablet by mouth every 6 (six) hours as needed for Pain.  Dispense: 90 tablet; Refill: 0  -     HYDROcodone-acetaminophen (NORCO)  mg per tablet; Take 1 tablet by mouth every 6 (six) hours as needed for Pain.  Dispense: 90 tablet; Refill: 0    Primary osteoarthritis of right knee  -     pregabalin (LYRICA) 100 MG capsule; TAKE 1 CAPSULE BY MOUTH TWO TIMES DAILY  Dispense: 180 capsule; Refill: 1  -     DULoxetine (CYMBALTA) 30 MG  capsule; Take 1 capsule (30 mg total) by mouth once daily.  Dispense: 90 capsule; Refill: 3  -     HYDROcodone-acetaminophen (NORCO)  mg per tablet; Take 1 tablet by mouth every 6 (six) hours as needed for Pain.  Dispense: 90 tablet; Refill: 0  -     HYDROcodone-acetaminophen (NORCO)  mg per tablet; Take 1 tablet by mouth every 6 (six) hours as needed for Pain.  Dispense: 90 tablet; Refill: 0  -     HYDROcodone-acetaminophen (NORCO)  mg per tablet; Take 1 tablet by mouth every 6 (six) hours as needed for Pain.  Dispense: 90 tablet; Refill: 0    Polyneuropathy associated with underlying disease  -     pregabalin (LYRICA) 100 MG capsule; TAKE 1 CAPSULE BY MOUTH TWO TIMES DAILY  Dispense: 180 capsule; Refill: 1  -     DULoxetine (CYMBALTA) 30 MG capsule; Take 1 capsule (30 mg total) by mouth once daily.  Dispense: 90 capsule; Refill: 3  -     HYDROcodone-acetaminophen (NORCO)  mg per tablet; Take 1 tablet by mouth every 6 (six) hours as needed for Pain.  Dispense: 90 tablet; Refill: 0  -     HYDROcodone-acetaminophen (NORCO)  mg per tablet; Take 1 tablet by mouth every 6 (six) hours as needed for Pain.  Dispense: 90 tablet; Refill: 0  -     HYDROcodone-acetaminophen (NORCO)  mg per tablet; Take 1 tablet by mouth every 6 (six) hours as needed for Pain.  Dispense: 90 tablet; Refill: 0    Osteoarthritis of spine with radiculopathy, cervical region  -     pregabalin (LYRICA) 100 MG capsule; TAKE 1 CAPSULE BY MOUTH TWO TIMES DAILY  Dispense: 180 capsule; Refill: 1  -     HYDROcodone-acetaminophen (NORCO)  mg per tablet; Take 1 tablet by mouth every 6 (six) hours as needed for Pain.  Dispense: 90 tablet; Refill: 0  -     HYDROcodone-acetaminophen (NORCO)  mg per tablet; Take 1 tablet by mouth every 6 (six) hours as needed for Pain.  Dispense: 90 tablet; Refill: 0  -     HYDROcodone-acetaminophen (NORCO)  mg per tablet; Take 1 tablet by mouth every 6 (six) hours as needed  for Pain.  Dispense: 90 tablet; Refill: 0    History of stroke  -     pregabalin (LYRICA) 100 MG capsule; TAKE 1 CAPSULE BY MOUTH TWO TIMES DAILY  Dispense: 180 capsule; Refill: 1  -     HYDROcodone-acetaminophen (NORCO)  mg per tablet; Take 1 tablet by mouth every 6 (six) hours as needed for Pain.  Dispense: 90 tablet; Refill: 0  -     HYDROcodone-acetaminophen (NORCO)  mg per tablet; Take 1 tablet by mouth every 6 (six) hours as needed for Pain.  Dispense: 90 tablet; Refill: 0  -     HYDROcodone-acetaminophen (NORCO)  mg per tablet; Take 1 tablet by mouth every 6 (six) hours as needed for Pain.  Dispense: 90 tablet; Refill: 0      Patient with chronic low back pain, secondary to lumbar spondylosis, possible radiculopathy, cx with morbid obesity.   Also with gait instability secondary to severe DJD of Rt knee.     1. Pain management.  Will resume  Hydrocodone to 10/325 mg, and Gabapentin is changed to Lyrica, that is helping,  Taking Lyrica 100 mg bid.  ( approved by insurance, and pain in hands is gone),   Increased  Cymbalta to 30 mg bid.    Opioid Risk Score     None         reviewed and appropriate.  Hydrocodone refills on 12/24, 11/20, 10/11/18.  Lyrica 100 mg bid, refills (#180 tabs/3 mo), refills on 10/16/18.  UDS ordered on 10/11/18.     2. Lt shoulder pain ,Ortho f/u, with steroid injection, and she is going to PT for shoulder pain.    RTC in 3-4 months.    Total time spent face to face with patient was 25 minutes.   More than 50% of that time was spent in counseling on diagnosis , prognosis and treatment options.   I also  patient  on common and most usual side effect of prescribed medications.   Risk and benefits of opiates, possible risk of developing opiate dependence and tolerance, need of strict compliance with prescribed medications.  Pain contract, rules and obligations were discussed with patient in details.  She is aware that I would be the only doctor prescribing her  pain medications and ED in a case of emergency.  I reviewed Primary care , and other specialty's notes to better coordinate patient's  care.   All questions were answered, and patient voiced understanding.                                                                                                                                                                        .

## 2019-02-13 NOTE — PATIENT INSTRUCTIONS
Counseling and Referral of Other Preventative  (Italic type indicates deductible and co-insurance are waived)    Patient Name: Annika Mac  Today's Date: 2/13/2019    Health Maintenance       Date Due Completion Date    Zoster Vaccine 12/08/2009 ---    Influenza Vaccine 08/01/2018 10/26/2017    Lipid Panel 04/23/2019 4/23/2018    Foot Exam 05/18/2019 5/18/2018 (Done)    Override on 5/18/2018: Done    Override on 7/11/2017: Done    Hemoglobin A1c 07/02/2019 1/2/2019    Eye Exam 10/30/2019 10/30/2018    High Dose Statin 02/13/2020 2/13/2019    DEXA SCAN 06/09/2020 6/9/2017    Mammogram 10/25/2020 10/25/2018    Colonoscopy 02/26/2028 2/26/2018    TETANUS VACCINE 08/24/2028 8/24/2018    Override on 9/30/2005: Done        No orders of the defined types were placed in this encounter.    The following information is provided to all patients.  This information is to help you find resources for any of the problems found today that may be affecting your health:                Living healthy guide: www.[facility].[place].gov      Understanding Diabetes: www.diabetes.org      Eating healthy: www.cdc.gov/healthyweight      CDC home safety checklist: www.cdc.gov/steadi/patient.html      Agency on Aging: www.goea.[place].[facility]      Alcoholics anonymous (AA): www.aa.org      Physical Activity: www.eric.nih.gov/zk7xbvf      Tobacco use: www.quitwithusla.org

## 2019-02-13 NOTE — PROGRESS NOTES
"Annika Mac presented for a  Medicare AWV and comprehensive Health Risk Assessment today. The following components were reviewed and updated:    · Medical history  · Family History  · Social history  · Allergies and Current Medications  · Health Risk Assessment  · Health Maintenance  · Care Team     ** See Completed Assessments for Annual Wellness Visit within the encounter summary.**       The following assessments were completed:  · Living Situation  · CAGE  · Depression Screening  · Timed Get Up and Go  · Whisper Test  · Cognitive Function Screening  · Nutrition Screening  · ADL Screening  · PAQ Screening        Vitals:    02/13/19 1305   BP: 130/68   Patient Position: Sitting   Pulse: 89   Weight: 131.1 kg (289 lb 0.4 oz)   Height: 5' 5" (1.651 m)     Body mass index is 48.1 kg/m².     Physical Exam   Constitutional: She is oriented to person, place, and time. She appears well-developed and well-nourished. No distress.   To visit in wheel chair, obese stature   HENT:   Head: Normocephalic and atraumatic.   Eyes: No scleral icterus.   Neck: Normal range of motion. Neck supple.   Cardiovascular: Normal rate, regular rhythm, normal heart sounds and intact distal pulses.   Pulmonary/Chest: Effort normal and breath sounds normal. No respiratory distress.   Musculoskeletal: She exhibits no edema.   Compression stockings intact   Neurological: She is alert and oriented to person, place, and time.   Skin: Skin is warm and dry. She is not diaphoretic.   Psychiatric: She has a normal mood and affect. Her behavior is normal.       Diagnoses and health risks identified today and associated recommendations/orders:    1. Encounter for preventive health examination  Assessments completed  Preventive health recommendations reviewed  Get up and go screening not performed: pt to visit in wheelchair, uses cane or walker at home.  No recent falls    2. Uncontrolled type 2 diabetes mellitus with both eyes affected by proliferative " retinopathy without macular edema, with long-term current use of insulin  Stable. Last HgbA1C was 9.3  Currently on insulin and lirgulutide  Followed by endocrine.     3. Type 2 diabetes mellitus with diabetic polyneuropathy, with long-term current use of insulin  Stable.   Controlled with current medical therapy  Followed by endocrine and podiatry.     4. Diabetic autonomic neuropathy associated with type 2 diabetes mellitus  Stable.   Controlled with current medical therapy  Followed by endocrine and podiatry    5. Type 2 DM with CKD stage 4 and hypertension  Stable.   Followed by endocrine and nephrology.     6. Stage 4 chronic kidney disease  Stable.   Followed by nephrology    7. Anemia of chronic renal failure, stage 4 (severe)  Stable.   Controlled with current medical therapy  Followed by nephrology.     8. Iron deficiency anemia, unspecified iron deficiency anemia type  Stable.   Evaluated by hem/onc  Followed by PCP and nephrology.     9. Secondary hyperparathyroidism  Stable.   Controlled with current medical therapy  Followed by nephrology.     10. Obesity, diabetes, and hypertension syndrome  Stable.   Healthy diabetid diet and exercise promoted.  Patient doing physical therapy for shoulder three times weekly currently  Followed by PCP.     11. Morbid obesity with BMI of 45.0-49.9, adult  Stable.   Followed by PCP.     12. PAD (peripheral artery disease)  Stable. Seen on DANIEL from 04/06/16.  Stable/improved on daniel from 2017  Controlled with current medical therapy  Followed by PCP/cardiology.     13. Diastolic dysfunction  Stable. Seen on echo from 05/27/15  Controlled with current medical therapy  Followed by cardiology     14. Essential hypertension  Stable.   Controlled with current medical therapy  Followed by PCP.     15. Dyslipidemia  Stable.   Controlled with current medical therapy  Followed by PCP.     16. Vitamin D insufficiency  Stable.   Followed by PCP.     17. Gastroesophageal reflux  disease without esophagitis  Stable.   Controlled with current medical therapy  Followed by PCP.     18. Idiopathic chronic gout without tophus, unspecified site  Stable.   Controlled with current medical therapy  Followed by PCP.     19. Lumbar facet arthropathy  Stable.   Controlled with current medical therapy  Followed by physical medicine    20. Spinal stenosis of lumbar region with neurogenic claudication  Stable.   Controlled with current medical therapy  Followed by physical medicine     21. Obstructive sleep apnea  Stable.   Not currently using cpap machine  Followed by PCP.     22. History of stroke  Stable.   Followed by PCP.     23. Ectatic aorta  Stable  Seen on cxr from 11/01/16  Followed by pcp      Provided Annika with a 5-10 year written screening schedule and personal prevention plan. Recommendations were developed using the USPSTF age appropriate recommendations. Education, counseling, and referrals were provided as needed. After Visit Summary printed and given to patient which includes a list of additional screenings\tests needed.    Follow-up in 4 weeks (on 3/13/2019) for a routine visit with your primary care provider or sooner if problems arise. .    Juhi Suazo, NP

## 2019-02-18 ENCOUNTER — TELEPHONE (OUTPATIENT)
Dept: ORTHOPEDICS | Facility: CLINIC | Age: 69
End: 2019-02-18

## 2019-02-20 LAB — HEMOGLOBIN VARIANT BY MASS SPECTROMETRY: NORMAL

## 2019-02-22 LAB
HGB A MFR BLD ELPH: 96.6 % (ref 95.8–98)
HGB A2 MFR BLD: 3.4 % (ref 2–3.3)
HGB F MFR BLD: 0 % (ref 0–0.9)
HGB FRACT BLD ELPH-IMP: ABNORMAL
HGB OTHER MFR BLD ELPH: 0 %
THEVP VARIANT 2: ABNORMAL
THEVP VARIANT 3: ABNORMAL

## 2019-02-25 ENCOUNTER — TELEPHONE (OUTPATIENT)
Dept: HEMATOLOGY/ONCOLOGY | Facility: CLINIC | Age: 69
End: 2019-02-25

## 2019-02-25 NOTE — TELEPHONE ENCOUNTER
spoke with pt on today in regards to 6 months f/u,  inform pt  schedule is not open just yet,pt voices she understands, pt is okay with new appointments mailed out once schedule is open.  
Detail Level: Simple

## 2019-02-25 NOTE — TELEPHONE ENCOUNTER
tried reaching out to pt on today in regards to scheduling 6 months f/u, but no answer,a detail message was left on v/m to contact office.

## 2019-02-26 ENCOUNTER — OFFICE VISIT (OUTPATIENT)
Dept: CARDIOLOGY | Facility: CLINIC | Age: 69
End: 2019-02-26
Payer: MEDICARE

## 2019-02-26 ENCOUNTER — TELEPHONE (OUTPATIENT)
Dept: ENDOCRINOLOGY | Facility: CLINIC | Age: 69
End: 2019-02-26

## 2019-02-26 VITALS
BODY MASS INDEX: 48.1 KG/M2 | HEART RATE: 75 BPM | HEIGHT: 65 IN | DIASTOLIC BLOOD PRESSURE: 57 MMHG | SYSTOLIC BLOOD PRESSURE: 115 MMHG

## 2019-02-26 DIAGNOSIS — I51.89 DIASTOLIC DYSFUNCTION: Chronic | ICD-10-CM

## 2019-02-26 DIAGNOSIS — E78.5 DYSLIPIDEMIA: Chronic | ICD-10-CM

## 2019-02-26 DIAGNOSIS — E78.5 HYPERLIPIDEMIA, UNSPECIFIED HYPERLIPIDEMIA TYPE: Primary | ICD-10-CM

## 2019-02-26 DIAGNOSIS — E66.01 MORBID OBESITY WITH BMI OF 45.0-49.9, ADULT: ICD-10-CM

## 2019-02-26 PROCEDURE — 99214 OFFICE O/P EST MOD 30 MIN: CPT | Mod: S$PBB,GC,, | Performed by: STUDENT IN AN ORGANIZED HEALTH CARE EDUCATION/TRAINING PROGRAM

## 2019-02-26 PROCEDURE — 99999 PR PBB SHADOW E&M-EST. PATIENT-LVL III: ICD-10-PCS | Mod: PBBFAC,GC,, | Performed by: STUDENT IN AN ORGANIZED HEALTH CARE EDUCATION/TRAINING PROGRAM

## 2019-02-26 PROCEDURE — 99213 OFFICE O/P EST LOW 20 MIN: CPT | Mod: PBBFAC | Performed by: STUDENT IN AN ORGANIZED HEALTH CARE EDUCATION/TRAINING PROGRAM

## 2019-02-26 PROCEDURE — 99214 PR OFFICE/OUTPT VISIT, EST, LEVL IV, 30-39 MIN: ICD-10-PCS | Mod: S$PBB,GC,, | Performed by: STUDENT IN AN ORGANIZED HEALTH CARE EDUCATION/TRAINING PROGRAM

## 2019-02-26 PROCEDURE — 99999 PR PBB SHADOW E&M-EST. PATIENT-LVL III: CPT | Mod: PBBFAC,GC,, | Performed by: STUDENT IN AN ORGANIZED HEALTH CARE EDUCATION/TRAINING PROGRAM

## 2019-02-26 RX ORDER — COLCHICINE 0.6 MG/1
0.6 TABLET ORAL
COMMUNITY
End: 2020-03-12

## 2019-02-26 NOTE — PROGRESS NOTES
"Subjective:    Patient ID:  Annika Mac is a 68 y.o. female who presents for follow-up of Diastolic dysfunction (6 month f/u )      HPI  66 yo F with PMH CKD 2, HTN, DM2, CVA, OA presenting for follow up of her Hypertension. She denies any MCFADDEN, Chest pain or new symptoms except for mild leg swelling after standing too long. She takes her medications as prescribed. She tries to eat a healthy diet but notes it is "hard" and has active weight issues and diabetes.      Review of Systems   Constitution: Positive for weight gain. Negative for chills, decreased appetite and diaphoresis.   HENT: Negative for congestion and ear discharge.    Eyes: Negative for blurred vision and discharge.   Cardiovascular: Negative for chest pain, dyspnea on exertion, irregular heartbeat, leg swelling and paroxysmal nocturnal dyspnea.   Respiratory: Negative for cough, hemoptysis and shortness of breath.    Gastrointestinal: Negative for abdominal pain.        Objective:    Physical Exam   Constitutional: She is oriented to person, place, and time. She appears well-developed and well-nourished. No distress.   Eyes: Conjunctivae are normal. Pupils are equal, round, and reactive to light.   Neck: No tracheal deviation present. No thyromegaly present.   Cardiovascular: Normal rate, regular rhythm, normal heart sounds and intact distal pulses. Exam reveals no gallop and no friction rub.   No murmur heard.  3/6 RUSB Systolic murmur    Pulmonary/Chest: Effort normal and breath sounds normal. No respiratory distress. She has no wheezes. She has no rales.   Abdominal: Soft. Bowel sounds are normal. She exhibits no distension. There is no tenderness.   Musculoskeletal: She exhibits no edema or deformity.   Neurological: She is alert and oriented to person, place, and time. No cranial nerve deficit. Coordination normal.   Skin: Skin is warm and dry. She is not diaphoretic.   Psychiatric: She has a normal mood and affect. Her behavior is normal.      "    Assessment:       1. Hyperlipidemia, unspecified hyperlipidemia type    2. Diastolic dysfunction    3. Dyslipidemia    4. Morbid obesity with BMI of 45.0-49.9, adult         Plan:       Diastolic dysfunction  - Euvolemic on exam  - Advised to continue eating low salt diet and use PRN lasix as needed    Dyslipidemia  - Check lipid panel    Morbid obesity with BMI of 45.0-49.9, adult  - I advised her to consider weight loss surgery but she is scared of this I explained this will help control her diabetes and BP    Chronic kidney disease  - I will check a BMP in 2 weeks and make sure kidney function is stable patient on ARB and aldactone. Adequate DM control needed    F/U in sep 2019

## 2019-02-26 NOTE — ASSESSMENT & PLAN NOTE
- I advised her to consider weight loss surgery but she is scared of this I explained this will help control her diabetes and BP

## 2019-02-26 NOTE — TELEPHONE ENCOUNTER
Spoke with pt and advise to  take 150u with breakfast 100u with lunch and 75u with dinner consistently. Needs to sent a Bg logs back in 2 weeks . Mail out more BG logs to pt.  Pt verbalized understand.

## 2019-03-07 ENCOUNTER — OFFICE VISIT (OUTPATIENT)
Dept: DERMATOLOGY | Facility: CLINIC | Age: 69
End: 2019-03-07
Payer: MEDICARE

## 2019-03-07 ENCOUNTER — OFFICE VISIT (OUTPATIENT)
Dept: PODIATRY | Facility: CLINIC | Age: 69
End: 2019-03-07
Payer: MEDICARE

## 2019-03-07 VITALS
RESPIRATION RATE: 18 BRPM | WEIGHT: 293 LBS | BODY MASS INDEX: 48.82 KG/M2 | HEART RATE: 80 BPM | HEIGHT: 65 IN | DIASTOLIC BLOOD PRESSURE: 64 MMHG | SYSTOLIC BLOOD PRESSURE: 123 MMHG

## 2019-03-07 DIAGNOSIS — L84 CORN OR CALLUS: ICD-10-CM

## 2019-03-07 DIAGNOSIS — E11.49 TYPE II DIABETES MELLITUS WITH NEUROLOGICAL MANIFESTATIONS: Primary | ICD-10-CM

## 2019-03-07 DIAGNOSIS — L28.0 LSC (LICHEN SIMPLEX CHRONICUS): ICD-10-CM

## 2019-03-07 DIAGNOSIS — B35.1 ONYCHOMYCOSIS DUE TO DERMATOPHYTE: ICD-10-CM

## 2019-03-07 DIAGNOSIS — L30.9 HAND ECZEMA: Primary | ICD-10-CM

## 2019-03-07 PROCEDURE — 99999 PR PBB SHADOW E&M-EST. PATIENT-LVL II: ICD-10-PCS | Mod: PBBFAC,,, | Performed by: NURSE PRACTITIONER

## 2019-03-07 PROCEDURE — 99213 OFFICE O/P EST LOW 20 MIN: CPT | Mod: PBBFAC | Performed by: PODIATRIST

## 2019-03-07 PROCEDURE — 11057 PARNG/CUTG B9 HYPRKR LES >4: CPT | Mod: Q9,PBBFAC | Performed by: PODIATRIST

## 2019-03-07 PROCEDURE — 11057 PARNG/CUTG B9 HYPRKR LES >4: CPT | Mod: Q9,S$PBB,, | Performed by: PODIATRIST

## 2019-03-07 PROCEDURE — 11721 PR DEBRIDEMENT OF NAILS, 6 OR MORE: ICD-10-PCS | Mod: 59,Q9,S$PBB, | Performed by: PODIATRIST

## 2019-03-07 PROCEDURE — 99213 OFFICE O/P EST LOW 20 MIN: CPT | Mod: S$PBB,,, | Performed by: NURSE PRACTITIONER

## 2019-03-07 PROCEDURE — 99213 PR OFFICE/OUTPT VISIT, EST, LEVL III, 20-29 MIN: ICD-10-PCS | Mod: S$PBB,,, | Performed by: NURSE PRACTITIONER

## 2019-03-07 PROCEDURE — 99999 PR PBB SHADOW E&M-EST. PATIENT-LVL II: CPT | Mod: PBBFAC,,, | Performed by: NURSE PRACTITIONER

## 2019-03-07 PROCEDURE — 11721 DEBRIDE NAIL 6 OR MORE: CPT | Mod: 59,Q9,PBBFAC | Performed by: PODIATRIST

## 2019-03-07 PROCEDURE — 99999 PR PBB SHADOW E&M-EST. PATIENT-LVL III: ICD-10-PCS | Mod: PBBFAC,,, | Performed by: PODIATRIST

## 2019-03-07 PROCEDURE — 99212 OFFICE O/P EST SF 10 MIN: CPT | Mod: PBBFAC,27,25 | Performed by: NURSE PRACTITIONER

## 2019-03-07 PROCEDURE — 99999 PR PBB SHADOW E&M-EST. PATIENT-LVL III: CPT | Mod: PBBFAC,,, | Performed by: PODIATRIST

## 2019-03-07 PROCEDURE — 11721 DEBRIDE NAIL 6 OR MORE: CPT | Mod: 59,Q9,S$PBB, | Performed by: PODIATRIST

## 2019-03-07 PROCEDURE — 99499 NO LOS: ICD-10-PCS | Mod: S$PBB,,, | Performed by: PODIATRIST

## 2019-03-07 PROCEDURE — 99499 UNLISTED E&M SERVICE: CPT | Mod: S$PBB,,, | Performed by: PODIATRIST

## 2019-03-07 PROCEDURE — 11057 PR TRIM BENIGN HYPERKERATOTIC SKIN LESION,>4: ICD-10-PCS | Mod: Q9,S$PBB,, | Performed by: PODIATRIST

## 2019-03-07 RX ORDER — TRIAMCINOLONE ACETONIDE 1 MG/G
OINTMENT TOPICAL
Qty: 60 G | Refills: 1 | Status: SHIPPED | OUTPATIENT
Start: 2019-03-07 | End: 2020-02-12

## 2019-03-07 NOTE — PROGRESS NOTES
Subjective:      Patient ID: Annika Mac is a 68 y.o. female.    Chief Complaint: PCP (Juhi Suazo NP 2/13/19); Diabetic Foot Exam; Nail Care; and Nail Problem (black toenails )    Annika is a 68 y.o. female who presents to the clinic for evaluation and treatment of high risk feet. Annika has a past medical history of Allergy, Anemia (7/27/2012), Arthritis, CHF (congestive heart failure), CKD (chronic kidney disease) stage 3, GFR 30-59 ml/min (7/27/2012), Clotting disorder, Colon polyp, Deep vein thrombophlebitis of left leg (7/27/2012), Degenerative disc disease, Diabetic peripheral neuropathy associated with type 2 diabetes mellitus (7/27/2012), GERD (gastroesophageal reflux disease), HTN (hypertension) (7/27/2012), Hyperlipidemia (7/27/2012), Lead-induced gout of ankle or foot, Nonproliferative diabetic retinopathy of right eye (7/27/2012), Obstructive sleep apnea (7/27/2012), Osteoporosis, Proliferative diabetic retinopathy of left eye (7/27/2012), Stroke (8/1/2003), Type 2 diabetes mellitus with diabetic polyneuropathy (7/27/2012), and Ulcer. The patient's chief complaint is long, thick toenails and calluses on both feet . No other major pedal complaintsThis patient has documented high risk feet requiring routine maintenance secondary to diabetes mellitis and those secondary complications of diabetes, as mentioned..       PCP: Mamie Cotton MD    Date Last Seen by PCP:       Chief Complaint   Patient presents with    PCP     Juhi Suazo NP 2/13/19    Diabetic Foot Exam    Nail Care    Nail Problem     black toenails        Current shoe gear: black leather DM shoes w/ custom inserts     Hemoglobin A1C   Date Value Ref Range Status   01/02/2019 9.3 (H) 4.0 - 5.6 % Final     Comment:     ADA Screening Guidelines:  5.7-6.4%  Consistent with prediabetes  >or=6.5%  Consistent with diabetes  High levels of fetal hemoglobin interfere with the HbA1C  assay. Heterozygous hemoglobin variants (HbS,  HgC, etc)do  not significantly interfere with this assay.   However, presence of multiple variants may affect accuracy.     06/08/2018 9.6 (H) 4.0 - 5.6 % Final     Comment:     ADA Screening Guidelines:  5.7-6.4%  Consistent with prediabetes  >or=6.5%  Consistent with diabetes  High levels of fetal hemoglobin interfere with the HbA1C  assay. Heterozygous hemoglobin variants (HbS, HgC, etc)do  not significantly interfere with this assay.   However, presence of multiple variants may affect accuracy.     03/13/2018 9.0 (H) 4.0 - 5.6 % Final     Comment:     According to ADA guidelines, hemoglobin A1c <7.0% represents  optimal control in non-pregnant diabetic patients. Different  metrics may apply to specific patient populations.   Standards of Medical Care in Diabetes-2016.  For the purpose of screening for the presence of diabetes:  <5.7%     Consistent with the absence of diabetes  5.7-6.4%  Consistent with increasing risk for diabetes   (prediabetes)  >or=6.5%  Consistent with diabetes  Currently, no consensus exists for use of hemoglobin A1c  for diagnosis of diabetes for children.  This Hemoglobin A1c assay has significant interference with fetal   hemoglobin   (HbF). The results are invalid for patients with abnormal amounts of   HbF,   including those with known Hereditary Persistence   of Fetal Hemoglobin. Heterozygous hemoglobin variants (HbAS, HbAC,   HbAD, HbAE, HbA2) do not significantly interfere with this assay;   however, presence of multiple variants in a sample may impact the %   interference.         Review of Systems   Constitution: Negative for chills, decreased appetite and fever.   Cardiovascular: Negative for leg swelling.   Skin: Positive for dry skin and nail changes. Negative for color change, flushing, itching, poor wound healing, rash and skin cancer.   Musculoskeletal: Positive for arthritis. Negative for back pain, gout, joint pain, joint swelling and myalgias.   Gastrointestinal:  Negative for nausea and vomiting.   Neurological: Positive for numbness. Negative for loss of balance and paresthesias.           Objective:      Physical Exam   Constitutional: She is oriented to person, place, and time. She appears well-developed and well-nourished. No distress.   Cardiovascular:   Dorsalis pedis and posterior tibial pulses are diminished Bilaterally. Toes are cool to touch. Feet are warm proximally.There is decreased digital hair. Skin is atrophic, slightly hyperpigmented, and mildly edematous       Musculoskeletal: Normal range of motion. She exhibits no edema, tenderness or deformity.   Equinus noted b/l ankles, gastroc. Adequate joint range of motion without pain, limitation, nor crepitation Bilateral pedal joints. Muscle strength is 5/5 in all groups bilaterally.        Neurological: She is alert and oriented to person, place, and time.   [place] 5.07 monofilamant testing is diminished [place] feet. Sharp/dull sensation diminished Bilaterally. Light touch absent Bilaterally.       Skin: Skin is warm, dry and intact. No abrasion, no bruising, no burn, no ecchymosis, no laceration, no lesion, no petechiae and no rash noted. She is not diaphoretic. No pallor.   Nails 1-5 b/l  are elongated by  2-6 mm's, thickened by 3-5 mm's, dystrophic, and are darkened in  coloration . Xerosis Bilaterally. No open lesions noted.      Hyperkeratotic tissue noted to distal 2-4 toe tips b/l.   Psychiatric: She has a normal mood and affect. Thought content normal.   Nursing note and vitals reviewed.            Assessment:       Encounter Diagnoses   Name Primary?    Type II diabetes mellitus with neurological manifestations Yes    Corn or callus     Onychomycosis due to dermatophyte          Plan:       Annika was seen today for pcp, diabetic foot exam, nail care and nail problem.    Diagnoses and all orders for this visit:    Type II diabetes mellitus with neurological manifestations    Corn or  callus    Onychomycosis due to dermatophyte      I counseled the patient on her conditions, their implications and medical management.        - Shoe inspection. Diabetic Foot Education. Patient reminded of the importance of good nutrition and blood sugar control to help prevent podiatric complications of diabetes. Patient instructed on proper foot hygeine. We discussed wearing proper shoe gear, daily foot inspections, never walking without protective shoe gear, never putting sharp instruments to feet, routine podiatric nail visits every 2-3 months.      - With patient's permission, nails were aggressively reduced and debrided x 10 to their soft tissue attachment mechanically and with electric , removing all offending nail and debris. Patient relates relief following the procedure. She will continue to monitor the areas daily, inspect her feet, wear protective shoe gear when ambulatory, moisturizer to maintain skin integrity and follow in this office in approximately 2-3 months, sooner p.r.n.    - After cleansing the  area w/ alcohol prep pad the above mentioned hyperkeratosis was trimmed utilizing No 15 scapel, to a smooth base with out incident. Patient tolerated this  well and reported comfort to the area of distal 2-4 toe b/l

## 2019-03-07 NOTE — PROGRESS NOTES
"  Subjective:       Patient ID:  Annika Mac is a 68 y.o. female who presents for   Chief Complaint   Patient presents with    Eczema      f/u ,comes and goes, new flare , spreading , very itchy      Eczema  - Follow-up  Symptom course: improving (last seen 1/4/19)  Currently using: Diprolene oint BID, qhs diprolnene oint under white gloves. using jennifer's hand cream after q hand wash. pt reports during day takes breaks & applies vaseline +white gloves. reports using rubber gloves for washing & mopping. washing hands w/ivry soap.  Affected locations: right fingers and left fingers  Signs / symptoms: itching, pain and dryness  Severity: mild to moderate    patient reports this itching "works her nerves" & is embarrassing bc she feels with she's always itching her hands    Review of Systems   Constitutional: Negative for fever, chills, weight loss, weight gain, fatigue, night sweats and malaise.        Denies smoking.    Skin: Positive for itching, rash and dry skin. Negative for daily sunscreen use and activity-related sunscreen use.        Denies h/o atopic derm        Objective:    Physical Exam   Constitutional: She appears well-developed and well-nourished. She is obese.  No distress.   Neurological: She is alert and oriented to person, place, and time. She is not disoriented.   Psychiatric: She has a normal mood and affect.   Skin:   Areas Examined (abnormalities noted in diagram):   Nails and Digits Inspection Performed             Diagram Legend     Erythematous scaling macule/papule c/w actinic keratosis       Vascular papule c/w angioma      Pigmented verrucoid papule/plaque c/w seborrheic keratosis      Yellow umbilicated papule c/w sebaceous hyperplasia      Irregularly shaped tan macule c/w lentigo     1-2 mm smooth white papules consistent with Milia      Movable subcutaneous cyst with punctum c/w epidermal inclusion cyst      Subcutaneous movable cyst c/w pilar cyst      Firm pink to brown papule c/w " dermatofibroma      Pedunculated fleshy papule(s) c/w skin tag(s)      Evenly pigmented macule c/w junctional nevus     Mildly variegated pigmented, slightly irregular-bordered macule c/w mildly atypical nevus      Flesh colored to evenly pigmented papule c/w intradermal nevus       Pink pearly papule/plaque c/w basal cell carcinoma      Erythematous hyperkeratotic cursted plaque c/w SCC      Surgical scar with no sign of skin cancer recurrence      Open and closed comedones      Inflammatory papules and pustules      Verrucoid papule consistent consistent with wart     Erythematous eczematous patches and plaques     Dystrophic onycholytic nail with subungual debris c/w onychomycosis     Umbilicated papule    Erythematous-base heme-crusted tan verrucoid plaque consistent with inflamed seborrheic keratosis     Erythematous Silvery Scaling Plaque c/w Psoriasis     See annotation      Assessment / Plan:        Hand eczema with LSC (lichen simplex chronicus)  -     triamcinolone acetonide 0.1% (KENALOG) 0.1 % ointment; AAA bid hands  Dispense: 60 g; Refill: 1    TAC (prescription ointment) twice daily    Recommend Cetaphil therapeutic hand cream after hand washing and every hour while awake.  Recommend Vaseline jelly under white cotton gloves qhs.    Discussed ILK if no improvement at follow up    Continue using gloves with mopping & dish washing         Follow-up in about 1 month (around 4/7/2019).

## 2019-03-07 NOTE — PATIENT INSTRUCTIONS
TAC (prescription ointment) twice daily    Recommend Cetaphil therapeutic hand cream after hand washing and every hour while awake.  Recommend Vaseline jelly under white cotton gloves qhs.

## 2019-03-13 ENCOUNTER — OFFICE VISIT (OUTPATIENT)
Dept: INTERNAL MEDICINE | Facility: CLINIC | Age: 69
End: 2019-03-13
Payer: MEDICARE

## 2019-03-13 VITALS
SYSTOLIC BLOOD PRESSURE: 134 MMHG | DIASTOLIC BLOOD PRESSURE: 62 MMHG | BODY MASS INDEX: 48.82 KG/M2 | HEIGHT: 65 IN | WEIGHT: 293 LBS

## 2019-03-13 DIAGNOSIS — N18.4 STAGE 4 CHRONIC KIDNEY DISEASE: ICD-10-CM

## 2019-03-13 DIAGNOSIS — E66.01 MORBID OBESITY WITH BMI OF 45.0-49.9, ADULT: ICD-10-CM

## 2019-03-13 DIAGNOSIS — I10 ESSENTIAL HYPERTENSION: Primary | ICD-10-CM

## 2019-03-13 DIAGNOSIS — I12.9 TYPE 2 DM WITH CKD STAGE 4 AND HYPERTENSION: ICD-10-CM

## 2019-03-13 DIAGNOSIS — L30.9 ECZEMA, UNSPECIFIED TYPE: ICD-10-CM

## 2019-03-13 DIAGNOSIS — E78.5 HYPERLIPIDEMIA, UNSPECIFIED HYPERLIPIDEMIA TYPE: ICD-10-CM

## 2019-03-13 DIAGNOSIS — N18.4 TYPE 2 DM WITH CKD STAGE 4 AND HYPERTENSION: ICD-10-CM

## 2019-03-13 DIAGNOSIS — E11.22 TYPE 2 DM WITH CKD STAGE 4 AND HYPERTENSION: ICD-10-CM

## 2019-03-13 DIAGNOSIS — K21.9 GASTROESOPHAGEAL REFLUX DISEASE WITHOUT ESOPHAGITIS: ICD-10-CM

## 2019-03-13 PROCEDURE — 99999 PR PBB SHADOW E&M-EST. PATIENT-LVL III: ICD-10-PCS | Mod: PBBFAC,,, | Performed by: INTERNAL MEDICINE

## 2019-03-13 PROCEDURE — 99999 PR PBB SHADOW E&M-EST. PATIENT-LVL III: CPT | Mod: PBBFAC,,, | Performed by: INTERNAL MEDICINE

## 2019-03-13 PROCEDURE — 99214 PR OFFICE/OUTPT VISIT, EST, LEVL IV, 30-39 MIN: ICD-10-PCS | Mod: S$PBB,,, | Performed by: INTERNAL MEDICINE

## 2019-03-13 PROCEDURE — 99214 OFFICE O/P EST MOD 30 MIN: CPT | Mod: S$PBB,,, | Performed by: INTERNAL MEDICINE

## 2019-03-13 PROCEDURE — 99213 OFFICE O/P EST LOW 20 MIN: CPT | Mod: PBBFAC | Performed by: INTERNAL MEDICINE

## 2019-03-13 RX ORDER — FAMOTIDINE 20 MG/1
20 TABLET, FILM COATED ORAL 2 TIMES DAILY
Qty: 1 TABLET | Refills: 0
Start: 2019-03-13 | End: 2022-09-14 | Stop reason: SDUPTHER

## 2019-03-13 RX ORDER — NAPROXEN SODIUM 220 MG/1
81 TABLET, FILM COATED ORAL DAILY
Refills: 0
Start: 2019-03-13

## 2019-03-14 ENCOUNTER — TELEPHONE (OUTPATIENT)
Dept: ENDOCRINOLOGY | Facility: CLINIC | Age: 69
End: 2019-03-14

## 2019-03-14 NOTE — TELEPHONE ENCOUNTER
Called a pt and advise to increase 90 units insulin at night and advise to watch diet.  Also scanned BG log scanned in media so Ursula can look over back if pt still needs adjustment. Pt verbalized understand.

## 2019-03-14 NOTE — PROGRESS NOTES
"Subjective:       Patient ID: Annika Mac is a 68 y.o. female.    Chief Complaint: Follow-up   This is a 68-year-old who presents today for follow-up.  Patient reports that she has been continuing to follow with Endocrinology for management of her diabetes and is on  U 500.  She has difficulty exercising with her arthritis but has tried to work on getting her weight down and it has been trending downward she does try to watch what she eats a bit more and has done some therapy in the past as well.  She has recently seen her cardiologist reports occasional chest discomfort no associated shortness of breath no symptoms currently is.  She does have reflux and had been taking a proton pump inhibitor due to her kidneys was placed on Pepcid which she uses regularly certain foods like to made a great were aggravate those symptoms at time    HPI  Review of Systems   Constitutional: Negative for fever.   HENT: Positive for congestion and postnasal drip.    Respiratory: Negative for cough, shortness of breath and wheezing.    Cardiovascular: Positive for chest pain. Negative for palpitations.   Gastrointestinal: Negative for abdominal pain and constipation.        Gerd with certain foods    Musculoskeletal: Positive for arthralgias.   Neurological: Negative for dizziness.       Objective:     Blood pressure 134/62, height 5' 5" (1.651 m), weight 134.7 kg (297 lb).    Physical Exam   Constitutional: No distress.   HENT:   Head: Normocephalic.   Mouth/Throat: Oropharynx is clear and moist.   Eyes: No scleral icterus.   Neck: Neck supple.   Cardiovascular: Normal rate and regular rhythm. Exam reveals no gallop and no friction rub.   Murmur heard.  Pulmonary/Chest: Effort normal and breath sounds normal. No respiratory distress.   Abdominal: Soft. Bowel sounds are normal. She exhibits no mass. There is no tenderness.   Musculoskeletal: She exhibits no edema.   Neurological: She is alert.   Skin: No erythema.   Psychiatric: She " has a normal mood and affect.   Vitals reviewed.      Assessment:       1. Essential hypertension    2. Morbid obesity with BMI of 45.0-49.9, adult    3. 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 Hyperlipidemia, unspecified hyperlipidemia type    5. Gastroesophageal reflux disease without esophagitis    6. Eczema, unspecified type    7. Stage 4 chronic kidney disease        Plan:       Annika was seen today for follow-up.    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 hypertension\  History of she is on U 500 and following with Endocrinology also remains on ligarglatude     Hyperlipidemia, unspecified hyperlipidemia type  Patient has recently seen her cardiologist as upcoming lipid planned  Remains statin    Gastroesophageal reflux disease without esophagitis  We discussed dietary measures precautions she remains on Pepcid    Eczema, unspecified type  Conservative measures she can use some cortisone to affected area    Rhinitis conservative meaures she uses otc allegra     Stage 4 chronic kidney disease  History of she has been following with her nephrologist and plans upcoming appointment    Other orders  -     aspirin 81 MG Chew; Take 1 tablet (81 mg total) by mouth once daily.  -     famotidine (PEPCID) 20 MG tablet; Take 1 tablet (20 mg total) by mouth 2 (two) times daily.    Follow up 4-6 months

## 2019-03-15 DIAGNOSIS — M51.36 DDD (DEGENERATIVE DISC DISEASE), LUMBAR: Primary | ICD-10-CM

## 2019-03-26 ENCOUNTER — LAB VISIT (OUTPATIENT)
Dept: LAB | Facility: HOSPITAL | Age: 69
End: 2019-03-26
Attending: STUDENT IN AN ORGANIZED HEALTH CARE EDUCATION/TRAINING PROGRAM
Payer: MEDICARE

## 2019-03-26 ENCOUNTER — TELEPHONE (OUTPATIENT)
Dept: ORTHOPEDICS | Facility: CLINIC | Age: 69
End: 2019-03-26

## 2019-03-26 DIAGNOSIS — M50.30 DDD (DEGENERATIVE DISC DISEASE), CERVICAL: Primary | ICD-10-CM

## 2019-03-26 DIAGNOSIS — E78.5 HYPERLIPIDEMIA, UNSPECIFIED HYPERLIPIDEMIA TYPE: ICD-10-CM

## 2019-03-26 DIAGNOSIS — I51.89 DIASTOLIC DYSFUNCTION: Chronic | ICD-10-CM

## 2019-03-26 LAB
ANION GAP SERPL CALC-SCNC: 10 MMOL/L (ref 8–16)
BUN SERPL-MCNC: 36 MG/DL (ref 8–23)
CALCIUM SERPL-MCNC: 10.1 MG/DL (ref 8.7–10.5)
CHLORIDE SERPL-SCNC: 106 MMOL/L (ref 95–110)
CHOLEST SERPL-MCNC: 153 MG/DL (ref 120–199)
CHOLEST/HDLC SERPL: 3.8 {RATIO} (ref 2–5)
CO2 SERPL-SCNC: 23 MMOL/L (ref 23–29)
CREAT SERPL-MCNC: 1.9 MG/DL (ref 0.5–1.4)
EST. GFR  (AFRICAN AMERICAN): 30.6 ML/MIN/1.73 M^2
EST. GFR  (NON AFRICAN AMERICAN): 26.5 ML/MIN/1.73 M^2
GLUCOSE SERPL-MCNC: 254 MG/DL (ref 70–110)
HDLC SERPL-MCNC: 40 MG/DL (ref 40–75)
HDLC SERPL: 26.1 % (ref 20–50)
LDLC SERPL CALC-MCNC: 83.4 MG/DL (ref 63–159)
NONHDLC SERPL-MCNC: 113 MG/DL
POTASSIUM SERPL-SCNC: 5.9 MMOL/L (ref 3.5–5.1)
SODIUM SERPL-SCNC: 139 MMOL/L (ref 136–145)
TRIGL SERPL-MCNC: 148 MG/DL (ref 30–150)

## 2019-03-26 PROCEDURE — 80048 BASIC METABOLIC PNL TOTAL CA: CPT

## 2019-03-26 PROCEDURE — 80061 LIPID PANEL: CPT

## 2019-03-26 PROCEDURE — 36415 COLL VENOUS BLD VENIPUNCTURE: CPT | Mod: PO

## 2019-03-27 ENCOUNTER — TELEPHONE (OUTPATIENT)
Dept: HEMATOLOGY/ONCOLOGY | Facility: CLINIC | Age: 69
End: 2019-03-27

## 2019-03-27 ENCOUNTER — HOSPITAL ENCOUNTER (OUTPATIENT)
Dept: RADIOLOGY | Facility: HOSPITAL | Age: 69
Discharge: HOME | End: 2019-03-27
Attending: ORTHOPAEDIC SURGERY
Payer: MEDICARE

## 2019-03-27 ENCOUNTER — TELEPHONE (OUTPATIENT)
Dept: ENDOCRINOLOGY | Facility: CLINIC | Age: 69
End: 2019-03-27

## 2019-03-27 ENCOUNTER — OFFICE VISIT (OUTPATIENT)
Dept: ORTHOPEDICS | Facility: CLINIC | Age: 69
End: 2019-03-27
Payer: MEDICARE

## 2019-03-27 VITALS — HEIGHT: 65 IN | WEIGHT: 293 LBS | BODY MASS INDEX: 48.82 KG/M2

## 2019-03-27 DIAGNOSIS — M54.12 CERVICAL RADICULOPATHY: Primary | ICD-10-CM

## 2019-03-27 DIAGNOSIS — M50.30 DDD (DEGENERATIVE DISC DISEASE), CERVICAL: ICD-10-CM

## 2019-03-27 DIAGNOSIS — G95.9 CERVICAL MYELOPATHY: ICD-10-CM

## 2019-03-27 PROCEDURE — 99214 OFFICE O/P EST MOD 30 MIN: CPT | Mod: PBBFAC,25 | Performed by: PHYSICIAN ASSISTANT

## 2019-03-27 PROCEDURE — 99999 PR PBB SHADOW E&M-EST. PATIENT-LVL IV: CPT | Mod: PBBFAC,,, | Performed by: PHYSICIAN ASSISTANT

## 2019-03-27 PROCEDURE — 99999 PR PBB SHADOW E&M-EST. PATIENT-LVL IV: ICD-10-PCS | Mod: PBBFAC,,, | Performed by: PHYSICIAN ASSISTANT

## 2019-03-27 PROCEDURE — 72050 X-RAY EXAM NECK SPINE 4/5VWS: CPT | Mod: 26,,, | Performed by: RADIOLOGY

## 2019-03-27 PROCEDURE — 72050 X-RAY EXAM NECK SPINE 4/5VWS: CPT | Mod: TC

## 2019-03-27 PROCEDURE — 72050 XR CERVICAL SPINE AP LAT WITH FLEX EXTEN: ICD-10-PCS | Mod: 26,,, | Performed by: RADIOLOGY

## 2019-03-27 PROCEDURE — 99214 OFFICE O/P EST MOD 30 MIN: CPT | Mod: S$PBB,,, | Performed by: PHYSICIAN ASSISTANT

## 2019-03-27 PROCEDURE — 99214 PR OFFICE/OUTPT VISIT, EST, LEVL IV, 30-39 MIN: ICD-10-PCS | Mod: S$PBB,,, | Performed by: PHYSICIAN ASSISTANT

## 2019-03-27 NOTE — TELEPHONE ENCOUNTER
Called pt and instructed to continue regimen what she is on right  now and sent us BG logs back in 3 weeks for reviewed. Pt verbalized understand. Contact no was provided.

## 2019-03-27 NOTE — PROGRESS NOTES
DATE: 3/27/2019  PATIENT: Annika Mac    Supervising Physician: Felipe Owusu M.D.    CHIEF COMPLAINT: back pain and left arm pain    HISTORY:  Annika Mac is a 68 y.o. female previously seen by Homero Leggett PA-C for her left shoulder here for initial evaluation of neck and left arm pain (Neck - 0, Arm - 7). The pain has been present for about 3 months. She also reports low back pain.  The patient describes the pain as aching. The pain is worse with walking and in the morning and improved by nothing in particular. There is associated numbness and tingling in the thumb and index fingers on the left. There is subjective weakness. Prior treatments have included norco, lyrica and physical therapy, but no ESIs or surgery.     The patient denies myelopathic symptoms such as handwriting changes.  She reprots difficulty with buttons/coins/keys. Denies perineal paresthesias, bowel/bladder dysfunction.    PAST MEDICAL/SURGICAL HISTORY:  Past Medical History:   Diagnosis Date    Allergy     Anemia 7/27/2012    Arthritis     CHF (congestive heart failure)     CKD (chronic kidney disease) stage 3, GFR 30-59 ml/min 7/27/2012    Clotting disorder     Colon polyp     Deep vein thrombophlebitis of left leg 7/27/2012    Degenerative disc disease     Diabetic peripheral neuropathy associated with type 2 diabetes mellitus 7/27/2012    GERD (gastroesophageal reflux disease)     HTN (hypertension) 7/27/2012    Hyperlipidemia 7/27/2012    Lead-induced gout of ankle or foot     right going on three weeks    Nonproliferative diabetic retinopathy of right eye 7/27/2012    Obstructive sleep apnea 7/27/2012    Osteoporosis     Proliferative diabetic retinopathy of left eye 7/27/2012    Stroke 8/1/2003    Type 2 diabetes mellitus with diabetic polyneuropathy 7/27/2012    Ulcer      Past Surgical History:   Procedure Laterality Date    CATARACT EXTRACTION W/  INTRAOCULAR LENS IMPLANT Left 3/2002    left      "CATARACT EXTRACTION W/  INTRAOCULAR LENS IMPLANT Right     OD dr. olivares     SECTION      COLONOSCOPY N/A 2018    [facility] (2ND F[facility])    CYST REMOVAL  2011    left side of face    ESOPHAGOGASTRODUODENOSCOPY (EGD) N/A 2018    Performed by [facility] at [facility] ENDO (2ND FLR)    EYE SURGERY Bilateral 2012    eye implants    GANGLION CYST EXCISION  2007    Right wrist    INSERTION, IOL PROSTHESIS Right 2012    Performed by Linda Olivares [facility] at [facility] OR 1ST FLR    Pan Retinal Photocoagulation Bilateral     Dr. Monterroso (Proliferative Diabetic Retinopathy)    PHACOEMULSIFICATION, CATARACT Right 2012    Performed [facility] at [facility] OR 1ST FLR    TOTAL ABDOMINAL HYSTERECTOMY  1982    TOTAL KNEE ARTHROPLASTY  2006    left    TRIGGER FINGER RELEASE  2009       Medications:  Current Outpatient Medications on File Prior to Visit   Medication Sig Dispense Refill    allopurinol (ZYLOPRIM) 100 MG tablet Take 3 tablets (300 mg total) by mouth once daily. 270 tablet 3    amLODIPine (NORVASC) 5 MG tablet Take 1 tablet (5 mg total) by mouth once daily. 90 tablet 1    aspirin 81 MG Chew Take 1 tablet (81 mg total) by mouth once daily.  0    BD ULTRA-FINE KIKA PEN NEEDLE 32 gauge x 5/32" Ndle To use 4 times daily 200 each 20    blood-glucose meter kit Use as instructed, true test/result meter 1 each 0    clobetasol (TEMOVATE) 0.05 % cream Apply topically 2 (two) times daily as needed. 30 g 2    colchicine (COLCRYS) 0.6 mg tablet Take 0.6 mg by mouth as needed.      DULoxetine (CYMBALTA) 30 MG capsule Take 1 capsule (30 mg total) by mouth once daily. 90 capsule 3    famotidine (PEPCID) 20 MG tablet Take 1 tablet (20 mg total) by mouth 2 (two) times daily. 1 tablet 0    fexofenadine (ALLEGRA) 180 MG tablet TAKE 1 TABLET BY MOUTH ONCE DAILY 30 tablet 0    fluocinonide (LIDEX) 0.05 % external solution AAA " scalp qday - bid prn pruritus 60 mL 3    furosemide (LASIX) 20 MG tablet Take 1 tablet (20 mg total) by mouth once daily. Take an extra pill if short of breath or leg swelling 120 tablet 3    [START ON 4/13/2019] HYDROcodone-acetaminophen (NORCO)  mg per tablet Take 1 tablet by mouth every 6 (six) hours as needed for Pain. 90 tablet 0    insulin regular hum U-500 conc (HUMULIN R U-500, CONC, KWIKPEN) 500 unit/mL (3 mL) InPn Inject  Units into the skin 3 (three) times daily before meals. Take 115u with breakfast,85u with lunch, and 50u with dinner. (Patient taking differently: Inject  Units into the skin 3 (three) times daily before meals. Take 115u with breakfast,140 with large and 120u with small.  100 unit with small 90 with large. Supper 65 large 50 small.) 11 Syringe 4    irbesartan (AVAPRO) 300 MG tablet Take 0.5 tablets (150 mg total) by mouth every evening. 90 tablet 3    ketoconazole (NIZORAL) 2 % shampoo Wash hair with medicated shampoo at least 2x/week - let sit on scalp at least 5 minutes prior to rinsing 120 mL 5    labetalol (NORMODYNE) 300 MG tablet Take 1 tablet (300 mg total) by mouth 2 (two) times daily. 180 tablet 3    lancets 31 gauge Misc 1 lancet by Misc.(Non-Drug; Combo Route) route 4 (four) times daily. 150 each 6    liraglutide 0.6 mg/0.1 mL, 18 mg/3 mL, subq PNIJ 0.6 mg/0.1 mL (18 mg/3 mL) PnIj Inject 0.6 mg daily into the skin for 2 weeks then increase to 1.2 mg thereafter daily. 3 mL 11    pregabalin (LYRICA) 100 MG capsule TAKE 1 CAPSULE BY MOUTH TWO TIMES DAILY 180 capsule 1    simvastatin (ZOCOR) 40 MG tablet Take 0.5 tablets (20 mg total) by mouth every evening. 90 tablet 1    spironolactone (ALDACTONE) 25 MG tablet Take 0.5 tablets (12.5 mg total) by mouth once daily. 90 tablet 2    triamcinolone acetonide 0.1% (KENALOG) 0.1 % ointment AAA bid hands 60 g 1    TRUETEST TEST STRIPS Strp 1 strip by Misc.(Non-Drug; Combo Route) route 4 (four) times daily.  400 strip 4     No current facility-administered medications on file prior to visit.        Social History:   Social History     Socioeconomic History    Marital status:      Spouse name: Heber    Number of children: 3    Years of education: Not on file    Highest education level: Not on file   Occupational History    Not on file   Social Needs    Financial resource strain: Not on file    Food insecurity:     Worry: Not on file     Inability: Not on file    Transportation needs:     Medical: Not on file     Non-medical: Not on file   Tobacco Use    Smoking status: Never Smoker    Smokeless tobacco: Never Used   Substance and Sexual Activity    Alcohol use: No    Drug use: No    Sexual activity: Yes     Partners: Male   Lifestyle    Physical activity:     Days per week: Not on file     Minutes per session: Not on file    Stress: Not on file   Relationships    Social connections:     Talks on phone: Not on file     Gets together: Not on file     Attends [employer] service: Not on file     Active member of club or organization: Not on file     Attends meetings of clubs or organizations: Not on file     Relationship status: Not on file    Intimate partner violence:     Fear of current or ex partner: Not on file     Emotionally abused: Not on file     Physically abused: Not on file     Forced sexual activity: Not on file   Other Topics Concern    Not on file   Social History Narrative    , lives with family; 3 children, 2 grandchildren       REVIEW OF SYSTEMS:  Constitution: Negative. Negative for chills, fever and night sweats.   Cardiovascular: Negative for chest pain and syncope.   Respiratory: Negative for cough and shortness of breath.   Gastrointestinal: See HPI. Negative for nausea/vomiting. Negative for abdominal pain.  Genitourinary: See HPI. Negative for discoloration or dysuria.  Skin: Negative for dry skin, itching and rash.   Hematologic/Lymphatic: Negative for bleeding  "problem. Does not bruise/bleed easily.   Musculoskeletal: Negative for falls and muscle weakness.   Neurological: See HPI. No seizures.   Endocrine: Negative for polydipsia, polyphagia and polyuria.   Allergic/Immunologic: Negative for hives and persistent infections.  Psychiatric/Behavioral: Negative for depression and insomnia.         EXAM:  Ht 5' 5" (1.651 m)   Wt 134.7 kg (296 lb 15.4 oz)   BMI 49.42 kg/m²     General: The patient is a pleasant 68 y.o. female in no apparent distress, the patient is oriented to person, place and time.  Psych: Normal mood and affect  HEENT: Vision grossly intact, hearing intact to the spoken word.  Lungs: Respirations unlabored.  Gait: Normal station and gait, no difficulty with toe or heel walk.   Skin: Cervical skin negative for rashes, lesions, hairy patches and surgical scars.  Range of motion: Cervical range of motion is acceptable. There is mild tenderness to palpation.  Spinal Balance: Global saggital and coronal spinal balance acceptable, no significant for scoliosis and kyphosis.  Musculoskeletal: There is pain with the range of motion of the left shoulder. Normal bulk and contour of the bilateral hands.  Vascular: Bilateral hands warm and well perfused, radial pulses 2+ bilaterally.  Neurological: Normal strength and tone in all major motor groups in the bilateral upper and lower extremities. Normal sensation to light touch in the C5-T1 and L2-S1 dermatomes bilaterally.  Deep tendon reflexes symmetric 2+ in the bilateral upper and lower extremities.  Negative Inverted Radial Reflex and Gooden's bilaterally. Negative Babinski bilaterally.     IMAGING:   Today I personally reviewed AP, Lat and Flex/Ex  upright C-spine films that demonstrate C5/6 and C6/7 disc space narrowing with anterior osteophytes.     MRI left shoulder demonstrates moderate supraspinatus tendinosis with a small low-grade partial-thickness articular surface tear of the anterior leading edge fibers.  " Infraspinatus tendinosis without partial or full-thickness tear.  Biceps tendinosis. Marked synovitis at the rotator interval.  Subacromial/subdeltoid bursitis.  Moderate AC joint hypertrophy with periarticular edema.    Body mass index is 49.42 kg/m².    Hemoglobin A1C   Date Value Ref Range Status   01/02/2019 9.3 (H) 4.0 - 5.6 % Final     Comment:     ADA Screening Guidelines:  5.7-6.4%  Consistent with prediabetes  >or=6.5%  Consistent with diabetes  High levels of fetal hemoglobin interfere with the HbA1C  assay. Heterozygous hemoglobin variants (HbS, HgC, etc)do  not significantly interfere with this assay.   However, presence of multiple variants may affect accuracy.     06/08/2018 9.6 (H) 4.0 - 5.6 % Final     Comment:     ADA Screening Guidelines:  5.7-6.4%  Consistent with prediabetes  >or=6.5%  Consistent with diabetes  High levels of fetal hemoglobin interfere with the HbA1C  assay. Heterozygous hemoglobin variants (HbS, HgC, etc)do  not significantly interfere with this assay.   However, presence of multiple variants may affect accuracy.     03/13/2018 9.0 (H) 4.0 - 5.6 % Final     Comment:     According to ADA guidelines, hemoglobin A1c <7.0% represents  optimal control in non-pregnant diabetic patients. Different  metrics may apply to specific patient populations.   Standards of Medical Care in Diabetes-2016.  For the purpose of screening for the presence of diabetes:  <5.7%     Consistent with the absence of diabetes  5.7-6.4%  Consistent with increasing risk for diabetes   (prediabetes)  >or=6.5%  Consistent with diabetes  Currently, no consensus exists for use of hemoglobin A1c  for diagnosis of diabetes for children.  This Hemoglobin A1c assay has significant interference with fetal   hemoglobin   (HbF). The results are invalid for patients with abnormal amounts of   HbF,   including those with known Hereditary Persistence   of Fetal Hemoglobin. Heterozygous hemoglobin variants (HbAS, HbAC,    HbAD, HbAE, HbA2) do not significantly interfere with this assay;   however, presence of multiple variants in a sample may impact the %   interference.             ASSESSMENT/PLAN:    Annika was seen today for low-back pain and pain.    Diagnoses and all orders for this visit:    Cervical radiculopathy  -     MRI Cervical Spine Without Contrast; Future    Cervical myelopathy  -     MRI Cervical Spine Without Contrast; Future        MRI cervical spine.  Follow up after the MRI to discuss results and further treatment.      Follow up if symptoms worsen or fail to improve.

## 2019-03-27 NOTE — LETTER
March 27, 2019      Homero Leggett PA-C  [address] [facility] [address]           [facility]  [address]  [place] [address]  Phone: [phone]          Patient: Annika Mac   MR Number: [id number]   YOB: 1950   Date of Visit: 3/27/2019       Dear Homero Leggett:    Thank you for referring nAnika Mac to me for evaluation. Attached you will find relevant portions of my assessment and plan of care.    If you have questions, please do not hesitate to call me. I look forward to following Annika Mac along with you.    Sincerely,    Irena Berger PA-C    Enclosure  CC:  No Recipients    If you would like to receive this communication electronically, please contact [email or web address] or [phone] to request more information on [facility] access.    For providers and/or their staff who would like to refer a patient to [facility], please contact us through our one-stop-shop provider referral line, [facility], at [phone].    If you feel you have received this communication in error or would no longer like to receive these types of communications, please e-mail [email or web address]

## 2019-03-27 NOTE — TELEPHONE ENCOUNTER
Rescheduled the follow up with Dr Kwan to April 12. Mailed new appt slips.        ----- Message from Michela Griffith sent at 3/27/2019 10:41 AM CDT -----  Contact: Pt   Pt called to reschedule the 4/10 appt to come in either on  4/12 or 4/9 if possible considering her other appts.    Please contact her at [phone]    Thank you

## 2019-03-27 NOTE — TELEPHONE ENCOUNTER
Reviewed pt BG logs. Sheree instructed patient to give:  Large meal insulin with all meals due to hyperglycemia still.

## 2019-04-02 ENCOUNTER — TELEPHONE (OUTPATIENT)
Dept: CARDIOLOGY | Facility: CLINIC | Age: 69
End: 2019-04-02

## 2019-04-02 NOTE — TELEPHONE ENCOUNTER
I had a long talk with Ms Roblero and explained her K is 5.9, she will get labs this Thursday to recheck. Told her to STOP her Aldactone and increase her Norvasc to 10mg.

## 2019-04-04 ENCOUNTER — LAB VISIT (OUTPATIENT)
Dept: LAB | Facility: HOSPITAL | Age: 69
End: 2019-04-04
Attending: INTERNAL MEDICINE
Payer: MEDICARE

## 2019-04-04 DIAGNOSIS — M1A.09X0 IDIOPATHIC CHRONIC GOUT OF MULTIPLE SITES WITHOUT TOPHUS: ICD-10-CM

## 2019-04-04 LAB
ALBUMIN SERPL BCP-MCNC: 3.7 G/DL (ref 3.5–5.2)
ALP SERPL-CCNC: 121 U/L (ref 55–135)
ALT SERPL W/O P-5'-P-CCNC: 16 U/L (ref 10–44)
ANION GAP SERPL CALC-SCNC: 8 MMOL/L (ref 8–16)
AST SERPL-CCNC: 18 U/L (ref 10–40)
BASOPHILS # BLD AUTO: 0.05 K/UL (ref 0–0.2)
BASOPHILS NFR BLD: 1 % (ref 0–1.9)
BILIRUB SERPL-MCNC: 0.3 MG/DL (ref 0.1–1)
BUN SERPL-MCNC: 27 MG/DL (ref 8–23)
CALCIUM SERPL-MCNC: 10.1 MG/DL (ref 8.7–10.5)
CHLORIDE SERPL-SCNC: 101 MMOL/L (ref 95–110)
CO2 SERPL-SCNC: 27 MMOL/L (ref 23–29)
CREAT SERPL-MCNC: 1.8 MG/DL (ref 0.5–1.4)
DIFFERENTIAL METHOD: ABNORMAL
EOSINOPHIL # BLD AUTO: 0.4 K/UL (ref 0–0.5)
EOSINOPHIL NFR BLD: 7.1 % (ref 0–8)
ERYTHROCYTE [DISTWIDTH] IN BLOOD BY AUTOMATED COUNT: 14.6 % (ref 11.5–14.5)
EST. GFR  (AFRICAN AMERICAN): 32.6 ML/MIN/1.73 M^2
EST. GFR  (NON AFRICAN AMERICAN): 28.3 ML/MIN/1.73 M^2
GLUCOSE SERPL-MCNC: 329 MG/DL (ref 70–110)
HCT VFR BLD AUTO: 33.3 % (ref 37–48.5)
HGB BLD-MCNC: 10.2 G/DL (ref 12–16)
IMM GRANULOCYTES # BLD AUTO: 0.01 K/UL (ref 0–0.04)
IMM GRANULOCYTES NFR BLD AUTO: 0.2 % (ref 0–0.5)
LYMPHOCYTES # BLD AUTO: 1.5 K/UL (ref 1–4.8)
LYMPHOCYTES NFR BLD: 30.9 % (ref 18–48)
MCH RBC QN AUTO: 28.8 PG (ref 27–31)
MCHC RBC AUTO-ENTMCNC: 30.6 G/DL (ref 32–36)
MCV RBC AUTO: 94 FL (ref 82–98)
MONOCYTES # BLD AUTO: 0.5 K/UL (ref 0.3–1)
MONOCYTES NFR BLD: 10.9 % (ref 4–15)
NEUTROPHILS # BLD AUTO: 2.5 K/UL (ref 1.8–7.7)
NEUTROPHILS NFR BLD: 49.9 % (ref 38–73)
NRBC BLD-RTO: 0 /100 WBC
PLATELET # BLD AUTO: 241 K/UL (ref 150–350)
PMV BLD AUTO: 11.3 FL (ref 9.2–12.9)
POTASSIUM SERPL-SCNC: 5.4 MMOL/L (ref 3.5–5.1)
PROT SERPL-MCNC: 6.5 G/DL (ref 6–8.4)
RBC # BLD AUTO: 3.54 M/UL (ref 4–5.4)
SODIUM SERPL-SCNC: 136 MMOL/L (ref 136–145)
URATE SERPL-MCNC: 5.3 MG/DL (ref 2.4–5.7)
WBC # BLD AUTO: 4.95 K/UL (ref 3.9–12.7)

## 2019-04-04 PROCEDURE — 80053 COMPREHEN METABOLIC PANEL: CPT

## 2019-04-04 PROCEDURE — 36415 COLL VENOUS BLD VENIPUNCTURE: CPT | Mod: PO

## 2019-04-04 PROCEDURE — 84550 ASSAY OF BLOOD/URIC ACID: CPT

## 2019-04-04 PROCEDURE — 85025 COMPLETE CBC W/AUTO DIFF WBC: CPT

## 2019-04-05 ENCOUNTER — TELEPHONE (OUTPATIENT)
Dept: CARDIOLOGY | Facility: CLINIC | Age: 69
End: 2019-04-05

## 2019-04-05 NOTE — TELEPHONE ENCOUNTER
Dr. Bobby, [facility] says that Avapro is out of stock.  I called [facility] and they have losartan 100 mg tablet, do not have valsartan. Please advise. Thanks, Cintia

## 2019-04-05 NOTE — TELEPHONE ENCOUNTER
----- Message from Ivelisse Cee sent at 4/5/2019 12:43 PM CDT -----  Contact: [facility]  The pt Avapro 300 mg is out of stock and a new Rx is needed.  LOV 2/26/19 Dr. Bobby    Thanks

## 2019-04-08 NOTE — TELEPHONE ENCOUNTER
Dr Moshe Bobby out on vac. Spoke with Dr Mcguire . Patient change to Candesartan 16 mg by mouth everyday. Pt notified.

## 2019-04-08 NOTE — TELEPHONE ENCOUNTER
----- Message from Allegra Coello sent at 4/8/2019 10:38 AM CDT -----  Contact: [facility]  .Needs Advice    Reason for call: irbesartan (AVAPRO) 300 MG tablet is not available calling to get an alternative        Communication Preference: [phone] Ref #- [id number]    Additional Information:

## 2019-04-09 ENCOUNTER — HOSPITAL ENCOUNTER (OUTPATIENT)
Dept: CARDIOLOGY | Facility: CLINIC | Age: 69
Discharge: HOME | End: 2019-04-09
Payer: MEDICARE

## 2019-04-09 ENCOUNTER — OFFICE VISIT (OUTPATIENT)
Dept: RHEUMATOLOGY | Facility: CLINIC | Age: 69
End: 2019-04-09
Payer: MEDICARE

## 2019-04-09 ENCOUNTER — CLINICAL SUPPORT (OUTPATIENT)
Dept: DIABETES | Facility: CLINIC | Age: 69
End: 2019-04-09
Payer: MEDICARE

## 2019-04-09 ENCOUNTER — PATIENT MESSAGE (OUTPATIENT)
Dept: CARDIOLOGY | Facility: CLINIC | Age: 69
End: 2019-04-09

## 2019-04-09 ENCOUNTER — HOSPITAL ENCOUNTER (OUTPATIENT)
Dept: RADIOLOGY | Facility: HOSPITAL | Age: 69
Discharge: HOME | End: 2019-04-09
Attending: INTERNAL MEDICINE
Payer: MEDICARE

## 2019-04-09 VITALS
WEIGHT: 293 LBS | BODY MASS INDEX: 48.82 KG/M2 | DIASTOLIC BLOOD PRESSURE: 57 MMHG | SYSTOLIC BLOOD PRESSURE: 116 MMHG | HEIGHT: 65 IN | HEART RATE: 78 BPM

## 2019-04-09 DIAGNOSIS — E11.42 TYPE 2 DIABETES MELLITUS WITH DIABETIC POLYNEUROPATHY, WITH LONG-TERM CURRENT USE OF INSULIN: Primary | ICD-10-CM

## 2019-04-09 DIAGNOSIS — I10 ESSENTIAL HYPERTENSION: ICD-10-CM

## 2019-04-09 DIAGNOSIS — M1A.09X0 IDIOPATHIC CHRONIC GOUT OF MULTIPLE SITES WITHOUT TOPHUS: Primary | ICD-10-CM

## 2019-04-09 DIAGNOSIS — Z79.4 TYPE 2 DIABETES MELLITUS WITH DIABETIC POLYNEUROPATHY, WITH LONG-TERM CURRENT USE OF INSULIN: Primary | ICD-10-CM

## 2019-04-09 PROCEDURE — 93010 ELECTROCARDIOGRAM REPORT: CPT | Mod: S$PBB,,, | Performed by: INTERNAL MEDICINE

## 2019-04-09 PROCEDURE — 71046 X-RAY EXAM CHEST 2 VIEWS: CPT | Mod: TC

## 2019-04-09 PROCEDURE — 99214 PR OFFICE/OUTPT VISIT, EST, LEVL IV, 30-39 MIN: ICD-10-PCS | Mod: S$PBB,,, | Performed by: INTERNAL MEDICINE

## 2019-04-09 PROCEDURE — 99214 OFFICE O/P EST MOD 30 MIN: CPT | Mod: PBBFAC,25 | Performed by: INTERNAL MEDICINE

## 2019-04-09 PROCEDURE — 93005 ELECTROCARDIOGRAM TRACING: CPT | Mod: PBBFAC | Performed by: INTERNAL MEDICINE

## 2019-04-09 PROCEDURE — 71046 XR CHEST PA AND LATERAL: ICD-10-PCS | Mod: 26,,, | Performed by: RADIOLOGY

## 2019-04-09 PROCEDURE — 99214 OFFICE O/P EST MOD 30 MIN: CPT | Mod: S$PBB,,, | Performed by: INTERNAL MEDICINE

## 2019-04-09 PROCEDURE — 99999 PR PBB SHADOW E&M-EST. PATIENT-LVL IV: CPT | Mod: PBBFAC,,, | Performed by: INTERNAL MEDICINE

## 2019-04-09 PROCEDURE — 93010 EKG 12-LEAD: ICD-10-PCS | Mod: S$PBB,,, | Performed by: INTERNAL MEDICINE

## 2019-04-09 PROCEDURE — 71046 X-RAY EXAM CHEST 2 VIEWS: CPT | Mod: 26,,, | Performed by: RADIOLOGY

## 2019-04-09 PROCEDURE — 99999 PR PBB SHADOW E&M-EST. PATIENT-LVL IV: ICD-10-PCS | Mod: PBBFAC,,, | Performed by: INTERNAL MEDICINE

## 2019-04-09 RX ORDER — AMLODIPINE BESYLATE 10 MG/1
10 TABLET ORAL DAILY
Qty: 90 TABLET | Refills: 0 | Status: SHIPPED | OUTPATIENT
Start: 2019-04-09 | End: 2019-06-13 | Stop reason: SDUPTHER

## 2019-04-09 RX ORDER — ALLOPURINOL 300 MG/1
300 TABLET ORAL DAILY
Qty: 90 TABLET | Refills: 3 | Status: SHIPPED | OUTPATIENT
Start: 2019-04-09 | End: 2019-07-08

## 2019-04-09 RX ORDER — CANDESARTAN 16 MG/1
16 TABLET ORAL DAILY
Qty: 90 TABLET | Refills: 3 | Status: ON HOLD | OUTPATIENT
Start: 2019-04-09 | End: 2019-05-25 | Stop reason: SDUPTHER

## 2019-04-09 ASSESSMENT — ROUTINE ASSESSMENT OF PATIENT INDEX DATA (RAPID3)
MDHAQ FUNCTION SCORE: .5
PSYCHOLOGICAL DISTRESS SCORE: 1.1
PAIN SCORE: 6.5
TOTAL RAPID3 SCORE: 5.06
AM STIFFNESS SCORE: 1, YES
FATIGUE SCORE: 5
PATIENT GLOBAL ASSESSMENT SCORE: 7

## 2019-04-09 NOTE — PROGRESS NOTES
Chief Complaint   Patient presents with    Disease Management     Patient with chronic gout for a follow up    History of presenting illness    68 year old black female comes with long standing gout since April 2017    Episode : right big toe and ankle  : redness,swelling and pain : couldn't walk,couldnt have a sheet touch her  She had fluid analysis : gout crystals  They gave her colcrys   Symptoms went away    Last I saw her in July she had left elbow effusion/gouty flare    Vianca xrays with spurring  Knee xrays with OA ON THE RIGHT  Left one replaced  LS spine OA  Hand and wrist xrays     Has had uric acids of > 10  .1  JOSE MIGUEL negative  ANCA negative    On allopurinol 100 mg since April 2017  We increased the dose to 200 mg  She has had uric acids 6.2/6.9 since  Her GFR stays stable 33 to 41  She has anemia     10/2018 :  we increased her allopurinol to 300 mg  Her uric acid went up to 7.6    We didn't change the dose of allopurinol since we were worried about the kidney function    1/2/2019 her uric acid is 5.2  4/4/2019 uric acid is 5.3    No more gout attacks    Off colchicine      CBC is better than last time  Hb improved  9.8 went upto 10.2    Kidney function better  Creat down to 1.8  GFR upto 32.6    Diet changes made  Spironolactone was stopped  Norvasc started     AST/ALT stable    Past history : DM,RUFINA,prior stroke,PAD,LS spine OA,knee OA,htn,hld    Family history : no family h/o gout    Social history : smoked in her high school years  Used to drink,not anymore    Review of Systems   Constitutional: Negative for activity change, appetite change, chills, diaphoresis, fatigue, fever and unexpected weight change.   HENT: Negative for congestion, dental problem, drooling, ear discharge, ear pain, facial swelling, hearing loss, mouth sores, nosebleeds, postnasal drip, rhinorrhea, sinus pressure, sinus pain, sneezing, sore throat, tinnitus, trouble swallowing and voice change.    Eyes: Negative for  photophobia, pain, discharge, redness, itching and visual disturbance.   Respiratory: Negative for apnea, cough, choking, chest tightness, shortness of breath, wheezing and stridor.    Cardiovascular: Negative for chest pain, palpitations and leg swelling.   Gastrointestinal: Negative for abdominal distention, abdominal pain, anal bleeding, blood in stool, constipation, diarrhea, nausea, rectal pain and vomiting.   Endocrine: Negative for cold intolerance, heat intolerance, polydipsia, polyphagia and polyuria.   Genitourinary: Negative for decreased urine volume, difficulty urinating, dysuria, enuresis, flank pain, frequency, genital sores, hematuria and urgency.   Musculoskeletal: Positive for arthralgias. Negative for back pain, gait problem, joint swelling, myalgias, neck pain and neck stiffness.   Skin: Negative for color change, pallor, rash and wound.   Allergic/Immunologic: Negative for environmental allergies, food allergies and immunocompromised state.   Neurological: Negative for dizziness, tremors, seizures, syncope, facial asymmetry, speech difficulty, weakness, light-headedness, numbness and headaches.   Hematological: Negative for adenopathy. Does not bruise/bleed easily.   Psychiatric/Behavioral: Negative for agitation, behavioral problems, confusion, decreased concentration, dysphoric mood, hallucinations, self-injury, sleep disturbance and suicidal ideas. The patient is not nervous/anxious and is not hyperactive.      Physical Exam     LINDSAY-28 tender joint count: 0  LINDSAY-28 swollen joint count: 0    Physical Exam   Constitutional: She is oriented to person, place, and time and well-developed, well-nourished, and in no distress. No distress.   HENT:   Head: Normocephalic.   Mouth/Throat: Oropharynx is clear and moist.   Eyes: Conjunctivae are normal. Pupils are equal, round, and reactive to light. Right eye exhibits no discharge. Left eye exhibits no discharge. No scleral icterus.   Neck: Normal range  of motion. No thyromegaly present.   Cardiovascular: Normal rate, regular rhythm, normal heart sounds and intact distal pulses.    Pulmonary/Chest: Effort normal and breath sounds normal. No stridor.   Abdominal: Soft. Bowel sounds are normal.   Lymphadenopathy:     She has no cervical adenopathy.   Neurological: She is alert and oriented to person, place, and time.   Skin: Skin is warm. No rash noted. She is not diaphoretic.     Psychiatric: Affect and judgment normal.   Musculoskeletal: Normal range of motion.           Assessment     68 year old black female with DM,RUFINA,prior stroke,PAD,LS spine OA,knee OA,htn,hld  She comes in with one episode of big toe and ankle pain and swelling on the right foot a year ago which resolved with colcrys and another episode of acute onset pain and swelling of the left elbow  She has had a fluid analysis once in the past which has revealed gout crystals she mentions  This is not available at [facility]    She has had hyperuricemia and she has been on allopurinol 100 mg since April 2017  Uric acids have trended down from 10 + to 7.2 while on allopurinol    We have increased the dose of allopurinol to 300 mg     We saw her during her left elbow flare,this responded to prednisone    She has no complaints today    We have achieved uric acid of less than 6    No recent gout flares    The CBC,CMP is all getting better     1. Idiopathic chronic gout of multiple sites without tophus        Reviewed labs/xrays  Reviewed medications    F/u problem    Continue 300 mg allopurinol    Get her back into nephrology clinic used to see     Dietary modifications discussed : gave a hand out of all foods to avoid : avoid organ meat,red meat,seafood,shell fish,anchovies,sardines,alcohol particularly beer,fructose containing soft drinks  Its ok to consume moderate wine,diet drinks,dairy proteins,coffee  Eat cherries  Eat purine rich vegetables    Labs and rtc in 6 months       Annika was seen  today for disease management.    Diagnoses and all orders for this visit:    Idiopathic chronic gout of multiple sites without tophus  -     CBC auto differential; Future  -     Comprehensive metabolic panel; Future  -     Uric acid; Future    Other orders  -     allopurinol (ZYLOPRIM) 300 MG tablet; Take 1 tablet (300 mg total) by mouth once daily.  -     amLODIPine (NORVASC) 10 MG tablet; Take 1 tablet (10 mg total) by mouth once daily.

## 2019-04-10 NOTE — PROGRESS NOTES
Diabetes Education  Author: Maria G Lopez RN  Date: 4/10/2019    Diabetes Care Management Summary  Diabetes Education Record Assessment/Progress: MNT Follow-up  Current Diabetes Risk Level: Moderate     Last A1c:   Lab Results   Component Value Date    HGBA1C 9.3 (H) 01/02/2019     Last visit with Diabetes Educator: : 04/09/2019      Diabetes Type  Diabetes Type : Type II    Diabetes History  Diabetes Diagnosis: 5-10 years  Current Treatment: Injectable, Insulin  Reviewed Problem List with Patient: Yes    Patient was seen for dietary consultation. Reviewed carbohydrate counting and portion control. She often eats a bedtime snack due to fear of overnight hypoglycemia.  Her last episode occurred in 2013 which required EMS intervention.   She has good recall on her meals (see below). Patient was encouraged to measure accurately using measuring cups.  She was provided with a meal plan that includes 30-45 grams (2-3 servings of carbs) per meal with less than 15 gram for snacks. Patient was provided with educational materials and was given time in clinic to practice selecting portions using food models. After examining her carb amount for each meal, patient was not counting correctly.  Some meals she considered large were small and visa versa.  Patient verbalized understanding after this was clarified.        Nutrition  Meal Planning: 3 meals per day, snacks between meal, diet drinks, water, eats out often, artificial sweeteners  What type of sweetener do you use?: Sweet N Low  What type of beverages do you drink?: water, diet soda/tea, other (see comments)  Meal Plan 24 Hour Recall - Breakfast: 8am - Small breakfast 1/2 cup grits, 1 egg, 1 sausage [place], 4 oz OJ Large breakfast 1/2 cup grits, eggs, wheat bread, sausage OJ or apple juice, 4 oz milk  Meal Plan 24 Hour Recall - Lunch: 1pm - Small lunch  1/2 cup rice, 1 cup beans, green salad 1/2 cup canned lite peaches, water Large lunch 1 cup mustard greens, 1 slice  cornbread, 1/2 cup rice, chicken breast, diet lemonade   Meal Plan 24 Hour Recall - Dinner: 6pm - Large hamburger [place], bun, lite estrella, 1/2 apple or orange, diet lemonade, reg jello cup Small spaghetti 1/2 cup, meat sauce, 2 in [place] bread, green salad, water, 1/2 canned peaches lite  Meal Plan 24 Hour Recall - Snack: HS - 1 cup milk, 6 vanilla wafers, 6 pk peanut butter crackers 23 gm, peanut butter sandwich Pt states eats only one of these choiced    Monitoring   Self Monitoring : did not provide current log sheets  Blood Glucose Logs: No  Do you use a personal continuous glucose monitor?: No  In the last month, how often have you had a low blood sugar reaction?: never    Patient was provided with literature regarding CGM and was shown samples. The benefits of continuous CGM was explained to patient.       Current Diabetes Treatment   Current Treatment: Injectable, Insulin    Current Diabetes Medication Regime: Humulin U500 pen:  Breakfast dose: Small meals 130 units, Large meal 150 units  Lunch dose: Small meals 90 units, Large meal 100 units  Dinner dose:  Small meal 65 units, Large meal 75 units  Victoza 1.8 mg  Patient denies missing any doses.     Social History  Preferred Learning Method: Face to Face, Hands On, Demonstration  Primary Support: Spouse       Barriers to Change  Barriers to Change: Functional limitation(h/o stroke, speach effected and some weakness, also has back pain)  Learning Challenges : None    Readiness to Learn   Readiness to Learn : Acceptance    Cultural Influences  Cultural Influences: None        Goals  Patient has selected/evaluated goals during today's session: Yes, selected  Healthy Eating: In Progress  Start Date: 04/09/19  Target Date: 05/08/19         Diabetes Care Plan/Intervention  Education Plan/Intervention: Individual Follow-Up DSMT, Endocrine Provider Visit Set Up    Diabetes Meal Plan  Restrictions: Restricted Carbohydrate  Calories: 1800  Carbohydrate Per Meal:  30-45g  Carbohydrate Per Snack : 7-15g    Today's Self-Management Care Plan was developed with the patient's input and is based on barriers identified during today's assessment.    The long and short-term goals in the care plan were written with the patient/caregiver's input. The patient has agreed to work toward these goals to improve her overall diabetes control.      The patient received a copy of today's self-management plan and verbalized understanding of the care plan, goals, and all of today's instructions.      The patient was encouraged to communicate with her physician and care team regarding her condition(s) and treatment.  I provided the patient with my contact information today and encouraged her to contact me via phone or patient portal as needed.     Education Units of Time   Time Spent: 60 min

## 2019-04-12 ENCOUNTER — OFFICE VISIT (OUTPATIENT)
Dept: ORTHOPEDICS | Facility: CLINIC | Age: 69
End: 2019-04-12
Payer: MEDICARE

## 2019-04-12 ENCOUNTER — OFFICE VISIT (OUTPATIENT)
Dept: HEMATOLOGY/ONCOLOGY | Facility: CLINIC | Age: 69
End: 2019-04-12
Payer: MEDICARE

## 2019-04-12 ENCOUNTER — OFFICE VISIT (OUTPATIENT)
Dept: DERMATOLOGY | Facility: CLINIC | Age: 69
End: 2019-04-12
Payer: MEDICARE

## 2019-04-12 ENCOUNTER — HOSPITAL ENCOUNTER (OUTPATIENT)
Dept: RADIOLOGY | Facility: HOSPITAL | Age: 69
Discharge: HOME | End: 2019-04-12
Attending: PHYSICIAN ASSISTANT
Payer: MEDICARE

## 2019-04-12 VITALS
RESPIRATION RATE: 18 BRPM | HEIGHT: 65 IN | WEIGHT: 293 LBS | OXYGEN SATURATION: 98 % | DIASTOLIC BLOOD PRESSURE: 87 MMHG | BODY MASS INDEX: 48.82 KG/M2 | HEART RATE: 80 BPM | SYSTOLIC BLOOD PRESSURE: 138 MMHG | TEMPERATURE: 98 F

## 2019-04-12 VITALS — BODY MASS INDEX: 48.82 KG/M2 | HEIGHT: 65 IN | WEIGHT: 293 LBS

## 2019-04-12 DIAGNOSIS — L30.9 HAND ECZEMA: Primary | ICD-10-CM

## 2019-04-12 DIAGNOSIS — M54.12 CERVICAL RADICULOPATHY: Primary | ICD-10-CM

## 2019-04-12 DIAGNOSIS — M50.30 DDD (DEGENERATIVE DISC DISEASE), CERVICAL: ICD-10-CM

## 2019-04-12 DIAGNOSIS — M54.12 CERVICAL RADICULOPATHY: ICD-10-CM

## 2019-04-12 DIAGNOSIS — M51.36 DDD (DEGENERATIVE DISC DISEASE), LUMBAR: ICD-10-CM

## 2019-04-12 DIAGNOSIS — N18.4 ANEMIA OF CHRONIC RENAL FAILURE, STAGE 4 (SEVERE): Primary | ICD-10-CM

## 2019-04-12 DIAGNOSIS — D63.1 ANEMIA OF CHRONIC RENAL FAILURE, STAGE 4 (SEVERE): Primary | ICD-10-CM

## 2019-04-12 DIAGNOSIS — L65.8 FEMALE PATTERN HAIR LOSS: ICD-10-CM

## 2019-04-12 DIAGNOSIS — G95.9 CERVICAL MYELOPATHY: ICD-10-CM

## 2019-04-12 DIAGNOSIS — N18.30 ANEMIA IN STAGE 3 CHRONIC KIDNEY DISEASE: ICD-10-CM

## 2019-04-12 DIAGNOSIS — E66.01 MORBID OBESITY DUE TO EXCESS CALORIES: ICD-10-CM

## 2019-04-12 DIAGNOSIS — D63.1 ANEMIA IN STAGE 3 CHRONIC KIDNEY DISEASE: ICD-10-CM

## 2019-04-12 PROBLEM — N18.9 ANEMIA IN CHRONIC KIDNEY DISEASE (CKD): Status: ACTIVE | Noted: 2019-04-12

## 2019-04-12 PROCEDURE — 99213 OFFICE O/P EST LOW 20 MIN: CPT | Mod: S$PBB,,, | Performed by: PHYSICIAN ASSISTANT

## 2019-04-12 PROCEDURE — 99213 OFFICE O/P EST LOW 20 MIN: CPT | Mod: S$PBB,,, | Performed by: INTERNAL MEDICINE

## 2019-04-12 PROCEDURE — 72141 MRI NECK SPINE W/O DYE: CPT | Mod: 26,,, | Performed by: RADIOLOGY

## 2019-04-12 PROCEDURE — 72141 MRI CERVICAL SPINE WITHOUT CONTRAST: ICD-10-PCS | Mod: 26,,, | Performed by: RADIOLOGY

## 2019-04-12 PROCEDURE — 11900 INJECT SKIN LESIONS </W 7: CPT | Mod: S$PBB,,, | Performed by: NURSE PRACTITIONER

## 2019-04-12 PROCEDURE — 99212 OFFICE O/P EST SF 10 MIN: CPT | Mod: PBBFAC,25,27 | Performed by: NURSE PRACTITIONER

## 2019-04-12 PROCEDURE — 99999 PR PBB SHADOW E&M-EST. PATIENT-LVL II: CPT | Mod: PBBFAC,,, | Performed by: NURSE PRACTITIONER

## 2019-04-12 PROCEDURE — 72141 MRI NECK SPINE W/O DYE: CPT | Mod: TC

## 2019-04-12 PROCEDURE — 99214 OFFICE O/P EST MOD 30 MIN: CPT | Mod: PBBFAC,25,27 | Performed by: PHYSICIAN ASSISTANT

## 2019-04-12 PROCEDURE — 99213 PR OFFICE/OUTPT VISIT, EST, LEVL III, 20-29 MIN: ICD-10-PCS | Mod: S$PBB,,, | Performed by: INTERNAL MEDICINE

## 2019-04-12 PROCEDURE — 99999 PR PBB SHADOW E&M-EST. PATIENT-LVL II: ICD-10-PCS | Mod: PBBFAC,,, | Performed by: NURSE PRACTITIONER

## 2019-04-12 PROCEDURE — 99999 PR PBB SHADOW E&M-EST. PATIENT-LVL IV: ICD-10-PCS | Mod: PBBFAC,,, | Performed by: PHYSICIAN ASSISTANT

## 2019-04-12 PROCEDURE — 11900 INJECT SKIN LESIONS </W 7: CPT | Mod: PBBFAC | Performed by: NURSE PRACTITIONER

## 2019-04-12 PROCEDURE — 99215 OFFICE O/P EST HI 40 MIN: CPT | Mod: PBBFAC,25 | Performed by: INTERNAL MEDICINE

## 2019-04-12 PROCEDURE — 99999 PR PBB SHADOW E&M-EST. PATIENT-LVL V: ICD-10-PCS | Mod: PBBFAC,,, | Performed by: INTERNAL MEDICINE

## 2019-04-12 PROCEDURE — 99999 PR PBB SHADOW E&M-EST. PATIENT-LVL IV: CPT | Mod: PBBFAC,,, | Performed by: PHYSICIAN ASSISTANT

## 2019-04-12 PROCEDURE — 99213 PR OFFICE/OUTPT VISIT, EST, LEVL III, 20-29 MIN: ICD-10-PCS | Mod: 25,S$PBB,, | Performed by: NURSE PRACTITIONER

## 2019-04-12 PROCEDURE — 99213 OFFICE O/P EST LOW 20 MIN: CPT | Mod: 25,S$PBB,, | Performed by: NURSE PRACTITIONER

## 2019-04-12 PROCEDURE — 11900 PR INJECTION INTO SKIN LESIONS, UP TO 7: ICD-10-PCS | Mod: S$PBB,,, | Performed by: NURSE PRACTITIONER

## 2019-04-12 PROCEDURE — 99999 PR PBB SHADOW E&M-EST. PATIENT-LVL V: CPT | Mod: PBBFAC,,, | Performed by: INTERNAL MEDICINE

## 2019-04-12 PROCEDURE — 99213 PR OFFICE/OUTPT VISIT, EST, LEVL III, 20-29 MIN: ICD-10-PCS | Mod: S$PBB,,, | Performed by: PHYSICIAN ASSISTANT

## 2019-04-12 RX ORDER — CLOBETASOL PROPIONATE 0.5 MG/G
OINTMENT TOPICAL
Qty: 60 G | Refills: 3 | Status: SHIPPED | OUTPATIENT
Start: 2019-04-12 | End: 2019-04-17

## 2019-04-12 RX ORDER — FINASTERIDE 5 MG/1
5 TABLET, FILM COATED ORAL DAILY
Qty: 30 TABLET | Refills: 1 | Status: SHIPPED | OUTPATIENT
Start: 2019-04-12 | End: 2019-04-12 | Stop reason: SDUPTHER

## 2019-04-12 RX ORDER — FINASTERIDE 5 MG/1
TABLET, FILM COATED ORAL
Qty: 90 TABLET | Refills: 1 | Status: SHIPPED | OUTPATIENT
Start: 2019-04-12 | End: 2020-01-06

## 2019-04-12 NOTE — PROGRESS NOTES
Subjective:       Patient ID:  Annika Mac is a 68 y.o. female who presents for   Chief Complaint   Patient presents with    Eczema     follow-up , very itchy , unchanged      Eczema  - Follow-up  Symptom course: unchanged (last seen 3/7/19)  Currently using: currently using tac ointment.  Affected locations: left fingers, right hand, left hand and right fingers  Signs / symptoms: itching and cracking  Severity: mild to moderate      Pt also c/o hair loss  Still using keto shampoo BIW    Take multvitamin daily    Review of Systems   Constitutional: Negative for fever, chills and fatigue.   Genitourinary: Negative for irregular periods (s/p hysterectomy 1982).   Skin: Positive for itching.        Objective:    Physical Exam   Constitutional: She appears well-developed and well-nourished. No distress.   Neurological: She is alert and oriented to person, place, and time. She is not disoriented.   Psychiatric: She has a normal mood and affect.   Skin:   Areas Examined (abnormalities noted in diagram):   Nails and Digits Inspection Performed             Diagram Legend     Erythematous scaling macule/papule c/w actinic keratosis       Vascular papule c/w angioma      Pigmented verrucoid papule/plaque c/w seborrheic keratosis      Yellow umbilicated papule c/w sebaceous hyperplasia      Irregularly shaped tan macule c/w lentigo     1-2 mm smooth white papules consistent with Milia      Movable subcutaneous cyst with punctum c/w epidermal inclusion cyst      Subcutaneous movable cyst c/w pilar cyst      Firm pink to brown papule c/w dermatofibroma      Pedunculated fleshy papule(s) c/w skin tag(s)      Evenly pigmented macule c/w junctional nevus     Mildly variegated pigmented, slightly irregular-bordered macule c/w mildly atypical nevus      Flesh colored to evenly pigmented papule c/w intradermal nevus       Pink pearly papule/plaque c/w basal cell carcinoma      Erythematous hyperkeratotic cursted plaque c/w SCC       Surgical scar with no sign of skin cancer recurrence      Open and closed comedones      Inflammatory papules and pustules      Verrucoid papule consistent consistent with wart     Erythematous eczematous patches and plaques     Dystrophic onycholytic nail with subungual debris c/w onychomycosis     Umbilicated papule    Erythematous-base heme-crusted tan verrucoid plaque consistent with inflamed seborrheic keratosis     Erythematous Silvery Scaling Plaque c/w Psoriasis     See annotation      Assessment / Plan:        Hand eczema  -     clobetasol 0.05% (TEMOVATE) 0.05 % Oint; AAA bid hand  Dispense: 60 g; Refill: 3  -     triamcinolone acetonide injection 10 mg    Discussed patch testing to find out what irritant is possibly  causing patient to flare- patient would like to defer at this time    Intralesional Kenalog 5mg/cc (0.6 cc total) injected into 2 lesions on the left pointer finger & right pointer finger today after obtaining verbal consent including risk of surrounding hypopigmentation. Patient tolerated procedure well.    Units: 1  NDC for Kenalog 10mg/cc:  3665-7789-38      Continue gloves with dish washing   Continue vaseline multiple times throughout the day  Clobetasol under occlusion nightly      Female pattern hair loss  -     finasteride (PROSCAR) 5 mg tablet; Take 1 tablet (5 mg total) by mouth once daily.  Dispense: 30 tablet; Refill: 1    Encouraged hair supplement daily  Encouraged Rogaine 5% foam to affected area 1x per day. Must use consistently and if you stop using, the hair it stimulated to grow may fall out over time. Generic acceptable.                Follow up in about 2 months (around 6/12/2019).

## 2019-04-12 NOTE — PROGRESS NOTES
Subjective:       Patient ID: Annika Mac is a 68 y.o. female.    Chief Complaint: No chief complaint on file.    Anemia        Mrs. Mac presents today for followup for her longstanding anemia.  Briefly, she is a morbidly obese 64-year-old -American female with longstanding anemia, whom I have followed expectantly for at least the last seven years. She is here today for her scheduled followup appointment.   Her CBC from earlier this week shows a WBC count is 4,800 /mm3, Hg 9.6 gr/dl, Ht 30.7 %, MCV 95 and platelet count of 315,000 /mm3.   Her creatinine is 1.9 mg/dl.  Multiple stool samples in the past, and three stool samples that she submitted today were negative for occult blood.    Review of Systems  Overall she feels fair. She again complains of her long standing arthritis, and is having difficulty ambulating.  She denies any anxiety, depression, easy bruising, fevers, chills, night sweats, weight loss, nausea, vomiting, diarrhea, diplopia, blurred vision, epistaxis hematuria, or headaches.  Her condition overall appears unchanged from her last visit 6 months ago.    Objective:      Physical Exam  GENERAL: She is alert, oriented to time, place, person, pleasant, well nourished, in no acute physical distress.   VITAL SIGNS: Reviewed.   HEENT: Normal. There are no nasal, oral, lip, gingival, auricular, lid, conjunctival lesions. Pupils are equal, reactive to light and   accommodation and extraocular muscle movements are intact. Mucosae are moist and pink, and there is no thrush.   NECK: Supple without JVD, adenopathy, or thyromegaly.   LUNGS: Clear to auscultation without wheezing, rales, or rhonchi.   CARDIOVASCULAR: Reveals an S1, S2, no murmurs, no rubs, no gallops.   ABDOMEN: Obese, soft, nontender, without organomegaly. Bowel sounds are present. A median abdominal scar is seen extending from the umbilicus to the symphysis pubis.   EXTREMITIES: No cyanosis or clubbing. There is 1(+) edema at the  ankle level bilaterally. She does have a walking boot on the right.  SKIN: Does not have petechiae, rashes, induration, or ecchymoses.   NEUROLOGIC: Motor function is 5/5, DTRs are 0 to 1+ bilaterally, symmetrical, and cranial nerves within normal limits.   LYMPHATIC: There is no cervical, axillary, or supraclavicular adenopathy.   Assessment:       1. Anemia, chronic, stable      2.    Morbid obesity  3.      Diabetes  4.      CKD  Plan:         I have asked her to return to see me in 6 months, and she will submit three stool samples at that time.  Given the chronicity of her anemia, I am not inclined to work her up further and perform a bone marrow biopsy unless her anemia gets worse.  Is uspect that her CKD contributes to her anemia.  Her questions were answered to her satisfaction.

## 2019-04-12 NOTE — PROGRESS NOTES
"DATE: 4/12/2019  PATIENT: Annika Mac    Attending Physician: Felipe Owusu M.D.    HISTORY:  Annika Mac is a 68 y.o. female who returns to me today for MRI results.  She was last seen by me 3/27/2019.  Today she is doing well but notes continued left shoulder pain.  The pain began in her left shoulder and then radiated to her neck and down the arm to the thumb and index fingers on the left.      The patient denies myelopathic symptoms such as handwriting changes.  She reports difficulty with buttons/coins/keys. Denies perineal paresthesias, bowel/bladder dysfunction.      PMH/PSH/FamHx/SocHx:  Unchanged from prior visit    ROS:  REVIEW OF SYSTEMS:  Constitution: Negative. Negative for chills, fever and night sweats.   HENT: Negative for congestion and headaches.    Eyes: Negative for blurred vision, left vision loss and right vision loss.   Cardiovascular: Negative for chest pain and syncope.   Respiratory: Negative for cough and shortness of breath.    Endocrine: Negative for polydipsia, polyphagia and polyuria.   Hematologic/Lymphatic: Negative for bleeding problem. Does not bruise/bleed easily.   Skin: Negative for dry skin, itching and rash.   Musculoskeletal: Negative for falls and muscle weakness.   Gastrointestinal: Negative for abdominal pain and bowel incontinence.   Allergic/Immunologic: Negative for hives and persistent infections.  Genitourinary: Negative for urinary retention/incontinence and nocturia.   Neurological:  No loss of balance and seizures.   Psychiatric/Behavioral: Negative for depression. The patient does not have insomnia.   Denies myelopathic symptoms, perineal paresthesias, bowel or bladder incontinence    EXAM:  Ht 5' 5" (1.651 m)   Wt 134.7 kg (296 lb 15.4 oz)   BMI 49.42 kg/m²     Physical exam stable.  Neuro exam stable.     IMAGING:  No new imaging today.    Today I personally re-reviewed AP, Lat and Flex/Ex  upright C-spine films that demonstrate C5/6 and C6/7 disc space " narrowing with anterior osteophytes.      MRI left shoulder demonstrates moderate supraspinatus tendinosis with a small low-grade partial-thickness articular surface tear of the anterior leading edge fibers.  Infraspinatus tendinosis without partial or full-thickness tear.  Biceps tendinosis. Marked synovitis at the rotator interval.  Subacromial/subdeltoid bursitis.  Moderate AC joint hypertrophy with periarticular edema.    MRI cervical spine demonstrates mild spondylosis with mild C6/7 neural foraminal narrowing.      Body mass index is 49.42 kg/m².    Hemoglobin A1C   Date Value Ref Range Status   01/02/2019 9.3 (H) 4.0 - 5.6 % Final     Comment:     ADA Screening Guidelines:  5.7-6.4%  Consistent with prediabetes  >or=6.5%  Consistent with diabetes  High levels of fetal hemoglobin interfere with the HbA1C  assay. Heterozygous hemoglobin variants (HbS, HgC, etc)do  not significantly interfere with this assay.   However, presence of multiple variants may affect accuracy.     06/08/2018 9.6 (H) 4.0 - 5.6 % Final     Comment:     ADA Screening Guidelines:  5.7-6.4%  Consistent with prediabetes  >or=6.5%  Consistent with diabetes  High levels of fetal hemoglobin interfere with the HbA1C  assay. Heterozygous hemoglobin variants (HbS, HgC, etc)do  not significantly interfere with this assay.   However, presence of multiple variants may affect accuracy.     03/13/2018 9.0 (H) 4.0 - 5.6 % Final     Comment:     According to ADA guidelines, hemoglobin A1c <7.0% represents  optimal control in non-pregnant diabetic patients. Different  metrics may apply to specific patient populations.   Standards of Medical Care in Diabetes-2016.  For the purpose of screening for the presence of diabetes:  <5.7%     Consistent with the absence of diabetes  5.7-6.4%  Consistent with increasing risk for diabetes   (prediabetes)  >or=6.5%  Consistent with diabetes  Currently, no consensus exists for use of hemoglobin A1c  for diagnosis of  diabetes for children.  This Hemoglobin A1c assay has significant interference with fetal   hemoglobin   (HbF). The results are invalid for patients with abnormal amounts of   HbF,   including those with known Hereditary Persistence   of Fetal Hemoglobin. Heterozygous hemoglobin variants (HbAS, HbAC,   HbAD, HbAE, HbA2) do not significantly interfere with this assay;   however, presence of multiple variants in a sample may impact the %   interference.           ASSESSMENT/PLAN:    Annika was seen today for shoulder pain.    Diagnoses and all orders for this visit:    Cervical radiculopathy  -     Ambulatory Referral to Physical/Occupational Therapy    DDD (degenerative disc disease), cervical  -     Ambulatory Referral to Physical/Occupational Therapy    DDD (degenerative disc disease), lumbar  -     Ambulatory Referral to Physical/Occupational Therapy        The patient's A1c is 9.3, so I do not think she is a good candidate for ESIs or shoulder injections.  Referral for PT placed today.  She will continue pain medications as prescribed by Dr. Barrientos.       Follow up if symptoms worsen or fail to improve.

## 2019-04-17 DIAGNOSIS — L30.9 HAND ECZEMA: Primary | ICD-10-CM

## 2019-04-17 RX ORDER — BETAMETHASONE DIPROPIONATE 0.5 MG/G
OINTMENT TOPICAL 2 TIMES DAILY
Qty: 45 G | Refills: 2 | Status: SHIPPED | OUTPATIENT
Start: 2019-04-17 | End: 2019-05-14 | Stop reason: ALTCHOICE

## 2019-04-24 ENCOUNTER — DOCUMENTATION ONLY (OUTPATIENT)
Dept: DERMATOLOGY | Facility: CLINIC | Age: 69
End: 2019-04-24

## 2019-04-30 ENCOUNTER — TELEPHONE (OUTPATIENT)
Dept: PHYSICAL MEDICINE AND REHAB | Facility: CLINIC | Age: 69
End: 2019-04-30

## 2019-04-30 NOTE — TELEPHONE ENCOUNTER
Left v/mail stating that we need her current insurance information to fill out PA, that she can bring it in to be scanned at the , and asking her to tell us which pharmacy so that we can verify a PA is indeed needed and that it isn't an error.    ----- Message from William Lobo sent at 4/30/2019  9:21 AM CDT -----  Needs Advice    Reason for call: Pt states auth is needed for HYDROcodone-acetaminophen (NORCO)  mg per tablet        Communication Preference: [phone]    Additional Information:

## 2019-04-30 NOTE — TELEPHONE ENCOUNTER
Called [facility] to verify PA needed for Norco.  BIN [id number]  PCN [id number]  GRP [id number]    PA completed through covermymeds for Norco, key [email or web address].

## 2019-05-01 ENCOUNTER — LAB VISIT (OUTPATIENT)
Dept: LAB | Facility: HOSPITAL | Age: 69
End: 2019-05-01
Attending: NURSE PRACTITIONER
Payer: MEDICARE

## 2019-05-01 DIAGNOSIS — E11.42 TYPE 2 DIABETES MELLITUS WITH DIABETIC POLYNEUROPATHY, WITH LONG-TERM CURRENT USE OF INSULIN: ICD-10-CM

## 2019-05-01 DIAGNOSIS — Z79.4 TYPE 2 DIABETES MELLITUS WITH DIABETIC POLYNEUROPATHY, WITH LONG-TERM CURRENT USE OF INSULIN: ICD-10-CM

## 2019-05-01 DIAGNOSIS — I10 ESSENTIAL HYPERTENSION: ICD-10-CM

## 2019-05-01 LAB
ALBUMIN SERPL BCP-MCNC: 3.3 G/DL (ref 3.5–5.2)
ALP SERPL-CCNC: 117 U/L (ref 55–135)
ALT SERPL W/O P-5'-P-CCNC: 21 U/L (ref 10–44)
ANION GAP SERPL CALC-SCNC: 8 MMOL/L (ref 8–16)
AST SERPL-CCNC: 18 U/L (ref 10–40)
BILIRUB SERPL-MCNC: 0.2 MG/DL (ref 0.1–1)
BUN SERPL-MCNC: 26 MG/DL (ref 8–23)
CALCIUM SERPL-MCNC: 9.2 MG/DL (ref 8.7–10.5)
CHLORIDE SERPL-SCNC: 107 MMOL/L (ref 95–110)
CO2 SERPL-SCNC: 23 MMOL/L (ref 23–29)
CREAT SERPL-MCNC: 1.7 MG/DL (ref 0.5–1.4)
EST. GFR  (AFRICAN AMERICAN): 35 ML/MIN/1.73 M^2
EST. GFR  (NON AFRICAN AMERICAN): 30.3 ML/MIN/1.73 M^2
ESTIMATED AVG GLUCOSE: 237 MG/DL (ref 68–131)
GLUCOSE SERPL-MCNC: 310 MG/DL (ref 70–110)
HBA1C MFR BLD HPLC: 9.9 % (ref 4–5.6)
POTASSIUM SERPL-SCNC: 4.4 MMOL/L (ref 3.5–5.1)
PROT SERPL-MCNC: 6 G/DL (ref 6–8.4)
SODIUM SERPL-SCNC: 138 MMOL/L (ref 136–145)
TSH SERPL DL<=0.005 MIU/L-ACNC: 1.42 UIU/ML (ref 0.4–4)

## 2019-05-01 PROCEDURE — 82985 ASSAY OF GLYCATED PROTEIN: CPT

## 2019-05-01 PROCEDURE — 80053 COMPREHEN METABOLIC PANEL: CPT

## 2019-05-01 PROCEDURE — 83036 HEMOGLOBIN GLYCOSYLATED A1C: CPT | Mod: 59

## 2019-05-01 PROCEDURE — 36415 COLL VENOUS BLD VENIPUNCTURE: CPT | Mod: PO

## 2019-05-01 PROCEDURE — 84443 ASSAY THYROID STIM HORMONE: CPT

## 2019-05-02 ENCOUNTER — TELEPHONE (OUTPATIENT)
Dept: ENDOCRINOLOGY | Facility: CLINIC | Age: 69
End: 2019-05-02

## 2019-05-02 NOTE — TELEPHONE ENCOUNTER
Called pt and advise that Ursula did received her BG logs and no changes for right now. Cont her old regimen. Pt verbalized understand.

## 2019-05-04 LAB — FRUCTOSAMINE SERPL-SCNC: 448 UMOL /L

## 2019-05-06 NOTE — PROGRESS NOTES
Subjective:      Patient ID: Annika Mac is a 68 y.o. female.    Chief Complaint:  Diabetes    History of Present Illness  Annika Mac presents today for follow up of T2DM. Last seen 1/4/19.      With regards to the diabetes:    Diagnosed: 1987. Started oral agents then NPH and Regular insulin. Converted to MDI with Lantus and Novolog in 2/2006 after knee surgery. D/c TZD r/t weight gain 7/08 and added Symlin. Stopped Symlin 2/09. Januvia added 2/10. D/C Januvia 12/10 r/t cost; resumed though pt assistance in 2016. Converted to U500 in 7/10.     Known complications:  DKA-  RN-  PN-  Nephropathy-    Current regimen:  Humulin u500 150 (130 small) breakfast, 100 (90 small lunch), & 75 (65 small dinner) we increased these doses this week   Victoza 1.8 mg daily    Glucose Monitor:  4-6 times a day testing  Log reviewed:       She is taking insulin at least 30 minutes before meals     Denies missed doses.  Has Medicare Extra Help     Eats 3 meals daily, + snacks  Drinks water, unsweet tea.   No formal exercise.    Hypoglycemia:  Hypoglycemic event? None   Knows how to correct with 15 grams of carbs- juice, coke, or a peppermint.      Education - last visit: 4/2019    Diabetes Management Status  Statin: Taking  ACE/ARB: Taking  Screening or Prevention Patient's value Goal Complete/Controlled?   HgA1C Testing and Control   Lab Results   Component Value Date    HGBA1C 9.9 (H) 05/01/2019      Annually/Less than 8% No   Lipid profile : 03/26/2019 Annually Yes   LDL control Lab Results   Component Value Date    LDLCALC 83.4 03/26/2019    Annually/Less than 100 mg/dl  Yes   Nephropathy screening Lab Results   Component Value Date    LABMICR 27.0 05/01/2019     Lab Results   Component Value Date    PROTEINUA Negative 02/13/2019    Annually Yes   Blood pressure BP Readings from Last 1 Encounters:   05/08/19 122/68    Less than 140/90 Yes   Dilated retinal exam : 10/30/2018 Annually Yes   Foot exam   : 05/18/2018 Annually Yes      With regards to hyperparathyroidism:  Results for [facility] (MRN [id number]) as of 5/6/2019 17:07   Ref. Range 10/26/2017 10:37 3/13/2018 11:32 2/13/2019 16:20   PTH Latest Ref Range: 9.0 - 77.0 pg/mL 107.0 (H)  82.0 (H)   Results for [facility] (MRN [id number]) as of 5/6/2019 17:07   Ref. Range 4/4/2019 10:45 4/9/2019 09:48 5/1/2019 09:40   Calcium Latest Ref Range: 8.7 - 10.5 mg/dL 10.1 9.9 9.2   Albumin Latest Ref Range: 3.5 - 5.2 g/dL 3.7 3.6 3.3 (L)   Results for [facility] (MRN [id number]) as of 5/6/2019 17:07   Ref. Range 2/13/2019 16:20   Vit D, 25-Hydroxy Latest Ref Range: 30 - 96 ng/mL 40     BMD 6/9/17  Normal spine and hip BMD. FRAX calculation does not support treatment for osteoporosis. total hip BMD declined 3% since 2008 and 10% since 2002. Lumbar spine BMD unchanged.  Recommendations:  1) Adequate calcium and Vitamin D therapy  2) Appropriate exercise  3) No need to repeat BMD in near future unless clinical change    Review of Systems   Constitutional: Negative for unexpected weight change.   Eyes: Negative for visual disturbance.   Respiratory: Negative for shortness of breath.    Cardiovascular: Negative for chest pain.   Gastrointestinal: Negative for abdominal pain.   Endocrine: Negative for cold intolerance, heat intolerance, polydipsia, polyphagia and polyuria.   Musculoskeletal: Negative for arthralgias.   Skin: Negative for wound.   Neurological: Negative for headaches.   Hematological: Does not bruise/bleed easily.   Psychiatric/Behavioral: Negative for sleep disturbance.     Objective:   Physical Exam   Neck: No thyromegaly present.   Cardiovascular: Normal rate.   No edema present   Pulmonary/Chest: Effort normal.   Abdominal: Soft.   Vitals reviewed.  Appropriate footwear, Foot exam deferred per patient  No ulcers or wounds.   injection sites are ok. No lipo hypertropthy or atrophy    Body mass index is 51.36 kg/m².    Lab Review:   Lab Results   Component Value Date    HGBA1C 9.9 (H)  05/01/2019     Lab Results   Component Value Date    CHOL 153 03/26/2019    HDL 40 03/26/2019    LDLCALC 83.4 03/26/2019    TRIG 148 03/26/2019    CHOLHDL 26.1 03/26/2019     Lab Results   Component Value Date     05/01/2019    K 4.4 05/01/2019     05/01/2019    CO2 23 05/01/2019     (H) 05/01/2019    BUN 26 (H) 05/01/2019    CREATININE 1.7 (H) 05/01/2019    CALCIUM 9.2 05/01/2019    PROT 6.0 05/01/2019    ALBUMIN 3.3 (L) 05/01/2019    BILITOT 0.2 05/01/2019    ALKPHOS 117 05/01/2019    AST 18 05/01/2019    ALT 21 05/01/2019    ANIONGAP 8 05/01/2019    ESTGFRAFRICA 35.0 (A) 05/01/2019    EGFRNONAA 30.3 (A) 05/01/2019    TSH 1.423 05/01/2019     Assessment and Plan     1. Type 2 diabetes mellitus with diabetic polyneuropathy, with long-term current use of insulin  Hemoglobin A1c    Comprehensive metabolic panel   2. Secondary hyperparathyroidism     3. Vitamin D insufficiency     4. Morbid obesity due to excess calories     5. Obstructive sleep apnea     6. Stage 4 chronic kidney disease     7. Essential hypertension     8. Dyslipidemia     9. Diabetic autonomic neuropathy associated with type 2 diabetes mellitus     10. Anemia in stage 3 chronic kidney disease       Type 2 diabetes mellitus with diabetic polyneuropathy, with long-term current use of insulin  -- Reviewed goals of therapy are to get the best control we can without hypoglycemia  Medication changes:   Continue current doses at this time since we recently changed doses & sugars are improving   -- Reviewed patient's current insulin regimen. Clarified proper insulin dose and timing in relation to meals, etc. Insulin injection sites and proper rotation instructed.    -- Advised frequent self blood glucose monitoring.  Patient encouraged to document glucose results and bring them to every clinic visit    -- Hypoglycemia precautions discussed. Instructed on precautions before driving.    -- Call for Bg repeatedly < 90 or > 180.   -- Close  adherence to lifestyle changes recommended.   -- Periodic follow ups for eye evaluations, foot care and dental care suggested.  -- foot exam @ next visit     -- Pt is very interested in the Vgo, I do not believe that she would be controlled with u100 insulin. I will discuss with Dr. Waite and let pt know the decision.     Stage 4 chronic kidney disease  Avoid insulin stacking    Secondary hyperparathyroidism  Secondary to kidney disease. Calcium normal    Vitamin D insufficiency  Defer supplementation to nephrology. Last lab normal    Peripheral autonomic neuropathy due to DM  -- Educated patient on proper comfortable foot wear, to always wear dry socks, never walk barefoot, never cut toenails too short, never test bath water with feet, encouraged patient to inspect feet daily for wounds., and if patient applies lotion to this feet to always go around the foot and not in between toes.     Morbid obesity due to excess calories  -- encouraged dietary and lifestyle modifications   -- emphasized weight loss goals     Essential hypertension  -- Controlled on current medications   -- Blood pressure goals discussed with patient    Dyslipidemia  Controlled   On statin per ADA recommendations      Anemia in chronic kidney disease (CKD)  Can alter a1c     Follow up in about 3 months (around 8/8/2019).  Labs prior to next appointment with me

## 2019-05-07 ENCOUNTER — TELEPHONE (OUTPATIENT)
Dept: NEPHROLOGY | Facility: CLINIC | Age: 69
End: 2019-05-07

## 2019-05-07 NOTE — TELEPHONE ENCOUNTER
----- Message from Shawanda Cisneros sent at 5/7/2019 11:16 AM CDT -----  Contact: self [phone]  .Needs Advice    Reason for call:        Communication Preference:phone     Additional Information:pt states she needs an apt states she use to see Dr. Ambrosio please call back to discuss

## 2019-05-08 ENCOUNTER — OFFICE VISIT (OUTPATIENT)
Dept: ENDOCRINOLOGY | Facility: CLINIC | Age: 69
End: 2019-05-08
Payer: MEDICARE

## 2019-05-08 ENCOUNTER — CLINICAL SUPPORT (OUTPATIENT)
Dept: DIABETES | Facility: CLINIC | Age: 69
End: 2019-05-08
Payer: MEDICARE

## 2019-05-08 VITALS
HEIGHT: 65 IN | SYSTOLIC BLOOD PRESSURE: 122 MMHG | DIASTOLIC BLOOD PRESSURE: 68 MMHG | BODY MASS INDEX: 48.82 KG/M2 | WEIGHT: 293 LBS | HEART RATE: 80 BPM

## 2019-05-08 DIAGNOSIS — E11.42 TYPE 2 DIABETES MELLITUS WITH DIABETIC POLYNEUROPATHY, WITH LONG-TERM CURRENT USE OF INSULIN: Primary | ICD-10-CM

## 2019-05-08 DIAGNOSIS — Z79.4 TYPE 2 DIABETES MELLITUS WITH DIABETIC POLYNEUROPATHY, WITH LONG-TERM CURRENT USE OF INSULIN: ICD-10-CM

## 2019-05-08 DIAGNOSIS — D63.1 ANEMIA IN STAGE 3 CHRONIC KIDNEY DISEASE: ICD-10-CM

## 2019-05-08 DIAGNOSIS — N18.30 ANEMIA IN STAGE 3 CHRONIC KIDNEY DISEASE: ICD-10-CM

## 2019-05-08 DIAGNOSIS — N18.4 STAGE 4 CHRONIC KIDNEY DISEASE: ICD-10-CM

## 2019-05-08 DIAGNOSIS — E66.01 MORBID OBESITY DUE TO EXCESS CALORIES: ICD-10-CM

## 2019-05-08 DIAGNOSIS — I10 ESSENTIAL HYPERTENSION: ICD-10-CM

## 2019-05-08 DIAGNOSIS — E11.43 DIABETIC AUTONOMIC NEUROPATHY ASSOCIATED WITH TYPE 2 DIABETES MELLITUS: ICD-10-CM

## 2019-05-08 DIAGNOSIS — Z79.4 TYPE 2 DIABETES MELLITUS WITH DIABETIC POLYNEUROPATHY, WITH LONG-TERM CURRENT USE OF INSULIN: Primary | ICD-10-CM

## 2019-05-08 DIAGNOSIS — E78.5 DYSLIPIDEMIA: Chronic | ICD-10-CM

## 2019-05-08 DIAGNOSIS — E11.42 TYPE 2 DIABETES MELLITUS WITH DIABETIC POLYNEUROPATHY, WITH LONG-TERM CURRENT USE OF INSULIN: ICD-10-CM

## 2019-05-08 DIAGNOSIS — G47.33 OBSTRUCTIVE SLEEP APNEA: Chronic | ICD-10-CM

## 2019-05-08 DIAGNOSIS — E55.9 VITAMIN D INSUFFICIENCY: ICD-10-CM

## 2019-05-08 DIAGNOSIS — N25.81 SECONDARY HYPERPARATHYROIDISM: ICD-10-CM

## 2019-05-08 PROCEDURE — G0108 DIAB MANAGE TRN  PER INDIV: HCPCS | Mod: PBBFAC

## 2019-05-08 PROCEDURE — 99213 OFFICE O/P EST LOW 20 MIN: CPT | Mod: PBBFAC | Performed by: NURSE PRACTITIONER

## 2019-05-08 PROCEDURE — 99214 PR OFFICE/OUTPT VISIT, EST, LEVL IV, 30-39 MIN: ICD-10-PCS | Mod: S$PBB,,, | Performed by: NURSE PRACTITIONER

## 2019-05-08 PROCEDURE — 99214 OFFICE O/P EST MOD 30 MIN: CPT | Mod: S$PBB,,, | Performed by: NURSE PRACTITIONER

## 2019-05-08 PROCEDURE — 99999 PR PBB SHADOW E&M-EST. PATIENT-LVL III: CPT | Mod: PBBFAC,,, | Performed by: NURSE PRACTITIONER

## 2019-05-08 PROCEDURE — 99999 PR PBB SHADOW E&M-EST. PATIENT-LVL III: ICD-10-PCS | Mod: PBBFAC,,, | Performed by: NURSE PRACTITIONER

## 2019-05-08 NOTE — ASSESSMENT & PLAN NOTE
-- Reviewed goals of therapy are to get the best control we can without hypoglycemia  Medication changes:   Continue current doses at this time since we recently changed doses & sugars are improving   -- Reviewed patient's current insulin regimen. Clarified proper insulin dose and timing in relation to meals, etc. Insulin injection sites and proper rotation instructed.    -- Advised frequent self blood glucose monitoring.  Patient encouraged to document glucose results and bring them to every clinic visit    -- Hypoglycemia precautions discussed. Instructed on precautions before driving.    -- Call for Bg repeatedly < 90 or > 180.   -- Close adherence to lifestyle changes recommended.   -- Periodic follow ups for eye evaluations, foot care and dental care suggested.  -- foot exam @ next visit     -- Pt is very interested in the Vgo, I do not believe that she would be controlled with u100 insulin. I will discuss with Dr. Waite and let pt know the decision.

## 2019-05-08 NOTE — ASSESSMENT & PLAN NOTE
-- Educated patient on proper comfortable foot wear, to always wear dry socks, never walk barefoot, never cut toenails too short, never test bath water with feet, encouraged patient to inspect feet daily for wounds., and if patient applies lotion to this feet to always go around the foot and not in between toes.

## 2019-05-09 ENCOUNTER — PATIENT MESSAGE (OUTPATIENT)
Dept: ENDOCRINOLOGY | Facility: CLINIC | Age: 69
End: 2019-05-09

## 2019-05-09 NOTE — PROGRESS NOTES
Diabetes Education  Author: Maria G Lopez RN  Date: 5/9/2019          Last A1c:   Lab Results   Component Value Date    HGBA1C 9.9 (H) 05/01/2019     Last visit with Diabetes Educator: : 05/08/2019       Patient in to see APRN for follow up.  She provided food logs for review. Patient was unable to stay to meet with me.  Advised will review her food logs and call her to discuss.    Maria G Lopez RN, BSN, CDE

## 2019-05-14 ENCOUNTER — OFFICE VISIT (OUTPATIENT)
Dept: OTOLARYNGOLOGY | Facility: CLINIC | Age: 69
End: 2019-05-14
Payer: MEDICARE

## 2019-05-14 VITALS
TEMPERATURE: 99 F | DIASTOLIC BLOOD PRESSURE: 63 MMHG | HEART RATE: 95 BPM | BODY MASS INDEX: 50.88 KG/M2 | SYSTOLIC BLOOD PRESSURE: 119 MMHG | WEIGHT: 293 LBS

## 2019-05-14 DIAGNOSIS — H90.5 SENSORINEURAL HEARING LOSS (SNHL) OF LEFT EAR, UNSPECIFIED HEARING STATUS ON CONTRALATERAL SIDE: Primary | ICD-10-CM

## 2019-05-14 PROCEDURE — 99999 PR PBB SHADOW E&M-EST. PATIENT-LVL IV: CPT | Mod: PBBFAC,,, | Performed by: ORTHOPAEDIC SURGERY

## 2019-05-14 PROCEDURE — 99213 PR OFFICE/OUTPT VISIT, EST, LEVL III, 20-29 MIN: ICD-10-PCS | Mod: S$PBB,,, | Performed by: ORTHOPAEDIC SURGERY

## 2019-05-14 PROCEDURE — 99214 OFFICE O/P EST MOD 30 MIN: CPT | Mod: PBBFAC | Performed by: ORTHOPAEDIC SURGERY

## 2019-05-14 PROCEDURE — 99213 OFFICE O/P EST LOW 20 MIN: CPT | Mod: S$PBB,,, | Performed by: ORTHOPAEDIC SURGERY

## 2019-05-14 PROCEDURE — 99999 PR PBB SHADOW E&M-EST. PATIENT-LVL IV: ICD-10-PCS | Mod: PBBFAC,,, | Performed by: ORTHOPAEDIC SURGERY

## 2019-05-14 NOTE — PROGRESS NOTES
Subjective:       Patient ID: Annika Mac is a 68 y.o. female.    Chief Complaint: Otitis Media (right ear pain had gotten worse. Going on for 2 weeks. )    Patient is a very pleasant 68 year old female here to see me today for evaluation of left otalgia.  She has hearing aids, and she says that her pain is worse when she wears her hearing aid.  She is continuing to wear her hearing aid.  She has noted some slight tenderness of the right superior pinna as well.  She has her hearing aid from an outside institution and has not seen her audiologist recently (obtained hearing aids 12 months ago, has not seen since then).    Review of Systems   Constitutional: Negative for chills, fatigue, fever and unexpected weight change.   HENT: Positive for ear pain and hearing loss. Negative for congestion, dental problem, ear discharge, facial swelling, nosebleeds, postnasal drip, rhinorrhea, sinus pressure, sneezing, sore throat, tinnitus, trouble swallowing and voice change.    Eyes: Negative for redness, itching and visual disturbance.   Respiratory: Negative for cough, choking, shortness of breath and wheezing.    Cardiovascular: Negative for chest pain and palpitations.   Gastrointestinal: Negative for abdominal pain.        No reflux.   Musculoskeletal: Negative for gait problem.   Skin: Negative for rash.   Neurological: Positive for headaches. Negative for dizziness and light-headedness.       Objective:      Physical Exam   Constitutional: She is oriented to person, place, and time. She appears well-developed and well-nourished. No distress.   HENT:   Head: Normocephalic and atraumatic.   Right Ear: Tympanic membrane, external ear and ear canal normal.   Left Ear: Tympanic membrane, external ear and ear canal normal.   Nose: Nose normal. No mucosal edema, rhinorrhea, nasal deformity or septal deviation. No epistaxis. Right sinus exhibits no maxillary sinus tenderness and no frontal sinus tenderness. Left sinus exhibits  no maxillary sinus tenderness and no frontal sinus tenderness.   Mouth/Throat: Uvula is midline, oropharynx is clear and moist and mucous membranes are normal. Mucous membranes are not pale and not dry. She has dentures. No dental caries. No oropharyngeal exudate or posterior oropharyngeal erythema.   Ear canal with healthy skin, no cerumen or otitis externa appreciated no middle ear effusion   Eyes: Pupils are equal, round, and reactive to light. Conjunctivae, EOM and lids are normal. Right eye exhibits no chemosis. Left eye exhibits no chemosis. Right conjunctiva is not injected. Left conjunctiva is not injected. No scleral icterus. Right eye exhibits normal extraocular motion and no nystagmus. Left eye exhibits normal extraocular motion and no nystagmus.   Neck: Trachea normal and phonation normal. No tracheal tenderness present. No tracheal deviation present. No thyroid mass and no thyromegaly present.   Cardiovascular: Intact distal pulses.   Pulmonary/Chest: Effort normal. No stridor. No respiratory distress.   Abdominal: She exhibits no distension.   Lymphadenopathy:        Head (right side): No submental, no submandibular, no preauricular, no posterior auricular and no occipital adenopathy present.        Head (left side): No submental, no submandibular, no preauricular, no posterior auricular and no occipital adenopathy present.     She has no cervical adenopathy.   Neurological: She is alert and oriented to person, place, and time. No cranial nerve deficit.   Skin: Skin is warm and dry. No rash noted. No erythema.   Psychiatric: She has a normal mood and affect. Her behavior is normal.       Assessment:       1. Sensorineural hearing loss (SNHL) of left ear, unspecified hearing status on contralateral side        Plan:       1.  Sensorineural hearing loss left ear:  She has pain most significantly when she is wearing her hearing aid in the left ear. At this time, she has a normal ear exam.  I would  recommend she contact her audiologist for evaluation and hearing aid adjustment as needed.

## 2019-05-23 ENCOUNTER — HOSPITAL ENCOUNTER (INPATIENT)
Facility: HOSPITAL | Age: 69
LOS: 3 days | Discharge: HOME HEALTH | DRG: 638 | End: 2019-05-26
Attending: EMERGENCY MEDICINE | Admitting: HOSPITALIST
Payer: MEDICARE

## 2019-05-23 DIAGNOSIS — E11.42 TYPE 2 DIABETES MELLITUS WITH DIABETIC POLYNEUROPATHY, WITH LONG-TERM CURRENT USE OF INSULIN: ICD-10-CM

## 2019-05-23 DIAGNOSIS — E11.42 TYPE 2 DIABETES MELLITUS WITH DIABETIC POLYNEUROPATHY: ICD-10-CM

## 2019-05-23 DIAGNOSIS — R73.9 HYPERGLYCEMIA: ICD-10-CM

## 2019-05-23 DIAGNOSIS — E13.10 DIABETIC KETOACIDOSIS WITHOUT COMA ASSOCIATED WITH OTHER SPECIFIED DIABETES MELLITUS: Primary | ICD-10-CM

## 2019-05-23 DIAGNOSIS — Z79.4 TYPE 2 DIABETES MELLITUS WITH DIABETIC POLYNEUROPATHY, WITH LONG-TERM CURRENT USE OF INSULIN: ICD-10-CM

## 2019-05-23 DIAGNOSIS — Z86.73 HISTORY OF STROKE: ICD-10-CM

## 2019-05-23 DIAGNOSIS — E11.10 DIABETIC KETOACIDOSIS WITHOUT COMA ASSOCIATED WITH TYPE 2 DIABETES MELLITUS: ICD-10-CM

## 2019-05-23 PROBLEM — N17.9 AKI (ACUTE KIDNEY INJURY): Status: ACTIVE | Noted: 2019-05-23

## 2019-05-23 LAB
ALBUMIN SERPL BCP-MCNC: 4.3 G/DL (ref 3.5–5.2)
ALLENS TEST: ABNORMAL
ALP SERPL-CCNC: 157 U/L (ref 55–135)
ALT SERPL W/O P-5'-P-CCNC: 22 U/L (ref 10–44)
ANION GAP SERPL CALC-SCNC: 11 MMOL/L (ref 8–16)
ANION GAP SERPL CALC-SCNC: 13 MMOL/L (ref 8–16)
ANION GAP SERPL CALC-SCNC: 8 MMOL/L (ref 8–16)
AST SERPL-CCNC: 29 U/L (ref 10–40)
B-OH-BUTYR BLD STRIP-SCNC: 0.6 MMOL/L (ref 0–0.5)
BACTERIA #/AREA URNS AUTO: NORMAL /HPF
BASOPHILS # BLD AUTO: 0.04 K/UL (ref 0–0.2)
BASOPHILS NFR BLD: 0.5 % (ref 0–1.9)
BILIRUB SERPL-MCNC: 0.5 MG/DL (ref 0.1–1)
BILIRUB UR QL STRIP: NEGATIVE
BUN SERPL-MCNC: 23 MG/DL (ref 8–23)
BUN SERPL-MCNC: 24 MG/DL (ref 8–23)
BUN SERPL-MCNC: 28 MG/DL (ref 8–23)
CALCIUM SERPL-MCNC: 10.2 MG/DL (ref 8.7–10.5)
CALCIUM SERPL-MCNC: 10.7 MG/DL (ref 8.7–10.5)
CALCIUM SERPL-MCNC: 9.9 MG/DL (ref 8.7–10.5)
CHLORIDE SERPL-SCNC: 104 MMOL/L (ref 95–110)
CHLORIDE SERPL-SCNC: 105 MMOL/L (ref 95–110)
CHLORIDE SERPL-SCNC: 97 MMOL/L (ref 95–110)
CLARITY UR REFRACT.AUTO: CLEAR
CO2 SERPL-SCNC: 22 MMOL/L (ref 23–29)
CO2 SERPL-SCNC: 23 MMOL/L (ref 23–29)
CO2 SERPL-SCNC: 27 MMOL/L (ref 23–29)
COLOR UR AUTO: ABNORMAL
CREAT SERPL-MCNC: 1.6 MG/DL (ref 0.5–1.4)
CREAT SERPL-MCNC: 1.7 MG/DL (ref 0.5–1.4)
CREAT SERPL-MCNC: 2.1 MG/DL (ref 0.5–1.4)
DELSYS: ABNORMAL
DIFFERENTIAL METHOD: ABNORMAL
EOSINOPHIL # BLD AUTO: 0.3 K/UL (ref 0–0.5)
EOSINOPHIL NFR BLD: 3.4 % (ref 0–8)
ERYTHROCYTE [DISTWIDTH] IN BLOOD BY AUTOMATED COUNT: 13.8 % (ref 11.5–14.5)
ERYTHROCYTE [SEDIMENTATION RATE] IN BLOOD BY WESTERGREN METHOD: 16 MM/H
EST. GFR  (AFRICAN AMERICAN): 27.1 ML/MIN/1.73 M^2
EST. GFR  (AFRICAN AMERICAN): 35 ML/MIN/1.73 M^2
EST. GFR  (AFRICAN AMERICAN): 37.6 ML/MIN/1.73 M^2
EST. GFR  (NON AFRICAN AMERICAN): 23.5 ML/MIN/1.73 M^2
EST. GFR  (NON AFRICAN AMERICAN): 30.3 ML/MIN/1.73 M^2
EST. GFR  (NON AFRICAN AMERICAN): 32.6 ML/MIN/1.73 M^2
FIO2: 21
GLUCOSE SERPL-MCNC: 230 MG/DL (ref 70–110)
GLUCOSE SERPL-MCNC: 355 MG/DL (ref 70–110)
GLUCOSE SERPL-MCNC: 574 MG/DL (ref 70–110)
GLUCOSE UR QL STRIP: ABNORMAL
HCO3 UR-SCNC: 24.4 MMOL/L (ref 24–28)
HCT VFR BLD AUTO: 34.5 % (ref 37–48.5)
HGB BLD-MCNC: 11.3 G/DL (ref 12–16)
HGB UR QL STRIP: NEGATIVE
IMM GRANULOCYTES # BLD AUTO: 0.02 K/UL (ref 0–0.04)
IMM GRANULOCYTES NFR BLD AUTO: 0.3 % (ref 0–0.5)
KETONES UR QL STRIP: ABNORMAL
LACTATE SERPL-SCNC: 2.2 MMOL/L (ref 0.5–2.2)
LEUKOCYTE ESTERASE UR QL STRIP: NEGATIVE
LYMPHOCYTES # BLD AUTO: 1.6 K/UL (ref 1–4.8)
LYMPHOCYTES NFR BLD: 21.1 % (ref 18–48)
MCH RBC QN AUTO: 29 PG (ref 27–31)
MCHC RBC AUTO-ENTMCNC: 32.8 G/DL (ref 32–36)
MCV RBC AUTO: 89 FL (ref 82–98)
MICROSCOPIC COMMENT: NORMAL
MODE: ABNORMAL
MONOCYTES # BLD AUTO: 0.6 K/UL (ref 0.3–1)
MONOCYTES NFR BLD: 8.2 % (ref 4–15)
NEUTROPHILS # BLD AUTO: 5 K/UL (ref 1.8–7.7)
NEUTROPHILS NFR BLD: 66.5 % (ref 38–73)
NITRITE UR QL STRIP: NEGATIVE
NRBC BLD-RTO: 0 /100 WBC
PCO2 BLDA: 53.7 MMHG (ref 35–45)
PH SMN: 7.26 [PH] (ref 7.35–7.45)
PH UR STRIP: 6 [PH] (ref 5–8)
PLATELET # BLD AUTO: 257 K/UL (ref 150–350)
PMV BLD AUTO: 11.6 FL (ref 9.2–12.9)
PO2 BLDA: 22 MMHG (ref 40–60)
POC BE: -3 MMOL/L
POC SATURATED O2: 28 % (ref 95–100)
POC TCO2: 26 MMOL/L (ref 24–29)
POCT GLUCOSE: 116 MG/DL (ref 70–110)
POCT GLUCOSE: 221 MG/DL (ref 70–110)
POCT GLUCOSE: 306 MG/DL (ref 70–110)
POCT GLUCOSE: 375 MG/DL (ref 70–110)
POCT GLUCOSE: 403 MG/DL (ref 70–110)
POTASSIUM SERPL-SCNC: 3.7 MMOL/L (ref 3.5–5.1)
POTASSIUM SERPL-SCNC: 4.2 MMOL/L (ref 3.5–5.1)
POTASSIUM SERPL-SCNC: 5.2 MMOL/L (ref 3.5–5.1)
PROT SERPL-MCNC: 7.8 G/DL (ref 6–8.4)
PROT UR QL STRIP: NEGATIVE
RBC # BLD AUTO: 3.9 M/UL (ref 4–5.4)
RBC #/AREA URNS AUTO: 1 /HPF (ref 0–4)
SAMPLE: ABNORMAL
SITE: ABNORMAL
SODIUM SERPL-SCNC: 133 MMOL/L (ref 136–145)
SODIUM SERPL-SCNC: 137 MMOL/L (ref 136–145)
SODIUM SERPL-SCNC: 140 MMOL/L (ref 136–145)
SP GR UR STRIP: 1.02 (ref 1–1.03)
SP02: 96
SQUAMOUS #/AREA URNS AUTO: 1 /HPF
URN SPEC COLLECT METH UR: ABNORMAL
WBC # BLD AUTO: 7.44 K/UL (ref 3.9–12.7)
WBC #/AREA URNS AUTO: 0 /HPF (ref 0–5)
YEAST UR QL AUTO: NORMAL

## 2019-05-23 PROCEDURE — 82962 GLUCOSE BLOOD TEST: CPT

## 2019-05-23 PROCEDURE — 20600001 HC STEP DOWN PRIVATE ROOM

## 2019-05-23 PROCEDURE — 63600175 PHARM REV CODE 636 W HCPCS: Performed by: EMERGENCY MEDICINE

## 2019-05-23 PROCEDURE — 83605 ASSAY OF LACTIC ACID: CPT

## 2019-05-23 PROCEDURE — 36415 COLL VENOUS BLD VENIPUNCTURE: CPT

## 2019-05-23 PROCEDURE — 99285 PR EMERGENCY DEPT VISIT,LEVEL V: ICD-10-PCS | Mod: ,,, | Performed by: EMERGENCY MEDICINE

## 2019-05-23 PROCEDURE — 96361 HYDRATE IV INFUSION ADD-ON: CPT

## 2019-05-23 PROCEDURE — 80048 BASIC METABOLIC PNL TOTAL CA: CPT | Mod: 91

## 2019-05-23 PROCEDURE — 99285 EMERGENCY DEPT VISIT HI MDM: CPT | Mod: ,,, | Performed by: EMERGENCY MEDICINE

## 2019-05-23 PROCEDURE — 96365 THER/PROPH/DIAG IV INF INIT: CPT

## 2019-05-23 PROCEDURE — 96366 THER/PROPH/DIAG IV INF ADDON: CPT

## 2019-05-23 PROCEDURE — 81001 URINALYSIS AUTO W/SCOPE: CPT

## 2019-05-23 PROCEDURE — 25000003 PHARM REV CODE 250: Performed by: EMERGENCY MEDICINE

## 2019-05-23 PROCEDURE — 63600175 PHARM REV CODE 636 W HCPCS: Performed by: STUDENT IN AN ORGANIZED HEALTH CARE EDUCATION/TRAINING PROGRAM

## 2019-05-23 PROCEDURE — 87040 BLOOD CULTURE FOR BACTERIA: CPT

## 2019-05-23 PROCEDURE — 80048 BASIC METABOLIC PNL TOTAL CA: CPT

## 2019-05-23 PROCEDURE — 80053 COMPREHEN METABOLIC PANEL: CPT

## 2019-05-23 PROCEDURE — 99291 CRITICAL CARE FIRST HOUR: CPT | Mod: 25

## 2019-05-23 PROCEDURE — 99900035 HC TECH TIME PER 15 MIN (STAT)

## 2019-05-23 PROCEDURE — 82803 BLOOD GASES ANY COMBINATION: CPT

## 2019-05-23 PROCEDURE — 82010 KETONE BODYS QUAN: CPT

## 2019-05-23 PROCEDURE — 96376 TX/PRO/DX INJ SAME DRUG ADON: CPT

## 2019-05-23 PROCEDURE — 85025 COMPLETE CBC W/AUTO DIFF WBC: CPT

## 2019-05-23 RX ORDER — SODIUM CHLORIDE 9 MG/ML
1000 INJECTION, SOLUTION INTRAVENOUS
Status: COMPLETED | OUTPATIENT
Start: 2019-05-23 | End: 2019-05-23

## 2019-05-23 RX ORDER — DEXTROSE MONOHYDRATE AND SODIUM CHLORIDE 5; .45 G/100ML; G/100ML
INJECTION, SOLUTION INTRAVENOUS CONTINUOUS
Status: DISCONTINUED | OUTPATIENT
Start: 2019-05-24 | End: 2019-05-23

## 2019-05-23 RX ORDER — ENOXAPARIN SODIUM 100 MG/ML
40 INJECTION SUBCUTANEOUS EVERY 24 HOURS
Status: DISCONTINUED | OUTPATIENT
Start: 2019-05-23 | End: 2019-05-26 | Stop reason: HOSPADM

## 2019-05-23 RX ORDER — LABETALOL 100 MG/1
300 TABLET, FILM COATED ORAL 2 TIMES DAILY
Status: DISCONTINUED | OUTPATIENT
Start: 2019-05-24 | End: 2019-05-26 | Stop reason: HOSPADM

## 2019-05-23 RX ORDER — SODIUM CHLORIDE AND POTASSIUM CHLORIDE 150; 450 MG/100ML; MG/100ML
INJECTION, SOLUTION INTRAVENOUS CONTINUOUS
Status: DISCONTINUED | OUTPATIENT
Start: 2019-05-23 | End: 2019-05-23

## 2019-05-23 RX ORDER — DEXTROSE MONOHYDRATE 100 MG/ML
1000 INJECTION, SOLUTION INTRAVENOUS
Status: DISCONTINUED | OUTPATIENT
Start: 2019-05-23 | End: 2019-05-23

## 2019-05-23 RX ORDER — AMLODIPINE BESYLATE 10 MG/1
10 TABLET ORAL DAILY
Status: DISCONTINUED | OUTPATIENT
Start: 2019-05-24 | End: 2019-05-26 | Stop reason: HOSPADM

## 2019-05-23 RX ORDER — ACETAMINOPHEN 325 MG/1
650 TABLET ORAL
Status: COMPLETED | OUTPATIENT
Start: 2019-05-23 | End: 2019-05-23

## 2019-05-23 RX ORDER — SODIUM CHLORIDE 0.9 % (FLUSH) 0.9 %
10 SYRINGE (ML) INJECTION
Status: DISCONTINUED | OUTPATIENT
Start: 2019-05-23 | End: 2019-05-23

## 2019-05-23 RX ORDER — DEXTROSE MONOHYDRATE 100 MG/ML
1000 INJECTION, SOLUTION INTRAVENOUS
Status: DISCONTINUED | OUTPATIENT
Start: 2019-05-23 | End: 2019-05-26 | Stop reason: HOSPADM

## 2019-05-23 RX ORDER — DEXTROSE MONOHYDRATE AND SODIUM CHLORIDE 5; .45 G/100ML; G/100ML
INJECTION, SOLUTION INTRAVENOUS CONTINUOUS
Status: DISCONTINUED | OUTPATIENT
Start: 2019-05-24 | End: 2019-05-24

## 2019-05-23 RX ORDER — NAPROXEN SODIUM 220 MG/1
81 TABLET, FILM COATED ORAL DAILY
Status: DISCONTINUED | OUTPATIENT
Start: 2019-05-24 | End: 2019-05-26 | Stop reason: HOSPADM

## 2019-05-23 RX ORDER — SIMVASTATIN 20 MG/1
20 TABLET, FILM COATED ORAL NIGHTLY
Status: DISCONTINUED | OUTPATIENT
Start: 2019-05-24 | End: 2019-05-26 | Stop reason: HOSPADM

## 2019-05-23 RX ORDER — DULOXETIN HYDROCHLORIDE 30 MG/1
30 CAPSULE, DELAYED RELEASE ORAL DAILY
Status: DISCONTINUED | OUTPATIENT
Start: 2019-05-24 | End: 2019-05-26 | Stop reason: HOSPADM

## 2019-05-23 RX ORDER — SODIUM CHLORIDE 0.9 % (FLUSH) 0.9 %
10 SYRINGE (ML) INJECTION
Status: DISCONTINUED | OUTPATIENT
Start: 2019-05-23 | End: 2019-05-26 | Stop reason: HOSPADM

## 2019-05-23 RX ORDER — ALLOPURINOL 300 MG/1
300 TABLET ORAL DAILY
Status: DISCONTINUED | OUTPATIENT
Start: 2019-05-24 | End: 2019-05-26 | Stop reason: HOSPADM

## 2019-05-23 RX ORDER — ACETAMINOPHEN 325 MG/1
650 TABLET ORAL EVERY 6 HOURS PRN
Status: DISCONTINUED | OUTPATIENT
Start: 2019-05-23 | End: 2019-05-26 | Stop reason: HOSPADM

## 2019-05-23 RX ORDER — ONDANSETRON 8 MG/1
8 TABLET, ORALLY DISINTEGRATING ORAL EVERY 6 HOURS PRN
Status: DISCONTINUED | OUTPATIENT
Start: 2019-05-23 | End: 2019-05-26 | Stop reason: HOSPADM

## 2019-05-23 RX ORDER — FAMOTIDINE 20 MG/1
20 TABLET, FILM COATED ORAL DAILY
Status: DISCONTINUED | OUTPATIENT
Start: 2019-05-24 | End: 2019-05-26 | Stop reason: HOSPADM

## 2019-05-23 RX ADMIN — ACETAMINOPHEN 650 MG: 325 TABLET ORAL at 07:05

## 2019-05-23 RX ADMIN — SODIUM CHLORIDE 200 ML: 9 INJECTION, SOLUTION INTRAVENOUS at 07:05

## 2019-05-23 RX ADMIN — DEXTROSE AND SODIUM CHLORIDE: 5; .45 INJECTION, SOLUTION INTRAVENOUS at 11:05

## 2019-05-23 RX ADMIN — POTASSIUM CHLORIDE AND SODIUM CHLORIDE: 450; 150 INJECTION, SOLUTION INTRAVENOUS at 09:05

## 2019-05-23 RX ADMIN — SODIUM CHLORIDE 1000 ML: 0.9 INJECTION, SOLUTION INTRAVENOUS at 03:05

## 2019-05-23 RX ADMIN — SODIUM CHLORIDE 6 UNITS/HR: 9 INJECTION, SOLUTION INTRAVENOUS at 07:05

## 2019-05-23 RX ADMIN — ENOXAPARIN SODIUM 40 MG: 100 INJECTION SUBCUTANEOUS at 09:05

## 2019-05-23 RX ADMIN — INSULIN HUMAN 8 UNITS: 100 INJECTION, SOLUTION PARENTERAL at 03:05

## 2019-05-23 RX ADMIN — SODIUM CHLORIDE 1000 ML: 0.9 INJECTION, SOLUTION INTRAVENOUS at 02:05

## 2019-05-23 RX ADMIN — SODIUM CHLORIDE 1000 ML: 0.9 INJECTION, SOLUTION INTRAVENOUS at 04:05

## 2019-05-23 NOTE — PROVIDER PROGRESS NOTES - EMERGENCY DEPT.
Encounter Date: 5/23/2019    ED Physician Progress Notes        Physician Note:   I was asked by Dr. Lopez to follow up on the patient.  Patient presents with hyperglycemia.  Initial glucose was found to be 562.  Will give IV fluids and insulin.  Her bicarb is 23 with no anion gap.  Beta hydroxybutyrate 0.6.    4:15 p.m. Accu-Chek is 360.  Will continue with IV fluids and get a repeat chemistry and blood gas at 5:00 p.m..    5:20 p.m. venous blood gas shows a pH of 7.26.  Will pain chemistries.  With this acidosis will most likely plan for hospital admission with medicine.    6:30 p.m. patient's glucose is 355.  No anion gap.  However with the acidosis I do feel the patient still needs hospital admission and she does meet inpatient criteria.  Will start fluids and insulin drip and admit the patient.    Critical care time I done by myself.  20 min re-evaluation of the patient.  10 min reviewing medical records.  5 min per charting.  Total critical care time is 35 min.

## 2019-05-23 NOTE — HPI
Annika Mac is a  a 68 y.o female admitted to Hospital medicine for mild DKA in the setting of her not contorlling her diet. She has a past medical history of DM ( on insulin and victoza ) , HTN, HLD, , CKD, HFpEF, stroke who presented to the ED with a complaint of hyperglycemia. She reported that on Tuesday she started having headaches and polyuria. The only thing different in her daily activities was she was eating two popsicles because she felt dehydrated on the day she had the headaches. She then noticed that every time she checked her sugar the glucometer could not read     Patient is taking victoza and SSI that has been steady regimen for her but she was told by her PCP that she is likely going to need some basal insulin. She has not missed any doses . She endorse nausea and inability to take home meds. She also has been having worsening Bilateral leg edema.  She denies fever, vomiting, dysuria, abdominal pain, or diarrhea.     Of note: hospitalized once for hypoglycemia never for hyperglycemia or DKA    In the ED found to have sugars > 500, bhydroxy elevated to 0.6, mildly acidotic, gap was closed, sugars fluctuating after given 4 L of NS and regular insulin. She was started on insulin drip

## 2019-05-23 NOTE — ED TRIAGE NOTES
"Pt arrived POV from home. Pt states "I've been going to the bathroom a lot and peeing, my head is killing me, and my sugar stay high for the past few days." +Polyuria, polydypsia, blurred vision, increased appetite. Pt states "my meter just read high."   "

## 2019-05-23 NOTE — ED PROVIDER NOTES
"Encounter Date: 5/23/2019    SCRIBE #1 NOTE: I, Edis Paulino, am scribing for, and in the presence of, Dr. BRANDON Lopez. Other sections scribed: HPI, ROS, PE, MDM.       History     Chief Complaint   Patient presents with    Hyperglycemia     Pt states that her glucometer read "high" at home.  Pt states that she has taken her medications as prescribed.     Time patient was seen by the provider: 12:57 PM      The patient is a 68 y.o. female with co-morbidities including:  DM, HTN, HLD, DVT, CKD, CHF, stroke who presents to the ED with a complaint of hyperglycemia. Pt reports she began feeling bad yesterday with a terrible HA that has been constant up to now. She states she cant lay down or hold her head down because her HA pain is exacerbated. Pt states when she had onset of urine frequency along with a HA she checked her home glucometer and her blood pressure was high, so she decided to come to the ED. Pt has been compliant with her prescribed insuline medication. Pt states last night she was sweating a lot and was going from cold to hot. Denies fever.  Denies dysuria, abdominal pain, or diarrhea. Endorses nausea, no vomiting. Denies cough. Endorses mild rhinorrhea. Pt reports she had been having ear pain in her left ear, which she also uses a hearing aid in but it has been resolved. Pt was put on abx which she reports were effective. Endorses leg swelling.    The history is provided by the patient.     Review of patient's allergies indicates:   Allergen Reactions    Iodinated contrast- oral and iv dye Rash     Past Medical History:   Diagnosis Date    Allergy     Anemia 7/27/2012    Arthritis     CHF (congestive heart failure)     CKD (chronic kidney disease) stage 3, GFR 30-59 ml/min 7/27/2012    Clotting disorder     Colon polyp     Deep vein thrombophlebitis of left leg 7/27/2012    Degenerative disc disease     Diabetic peripheral neuropathy associated with type 2 diabetes mellitus 7/27/2012    GERD " (gastroesophageal reflux disease)     HTN (hypertension) 2012    Hyperlipidemia 2012    Lead-induced gout of ankle or foot     right going on three weeks    Nonproliferative diabetic retinopathy of right eye 2012    Obstructive sleep apnea 2012    Osteoporosis     Proliferative diabetic retinopathy of left eye 2012    Stroke 2003    Type 2 diabetes mellitus with diabetic polyneuropathy 2012    Ulcer      Past Surgical History:   Procedure Laterality Date    CATARACT EXTRACTION W/  INTRAOCULAR LENS IMPLANT Left 3/2002    left     CATARACT EXTRACTION W/  INTRAOCULAR LENS IMPLANT Right     OD dr. olivares     SECTION      COLONOSCOPY N/A 2018    [facility] (2ND FLR)    CYST REMOVAL  2011    left side of face    ESOPHAGOGASTRODUODENOSCOPY (EGD) N/A 2018    [facility] ([facility])    EYE SURGERY Bilateral 2012    eye implants    GANGLION CYST EXCISION  2007    Right wrist    INSERTION, IOL PROSTHESIS Right 2012    [facility] F[facility]    Pan Retinal Photocoagulation Bilateral     Dr. Monterroso (Proliferative Diabetic Retinopathy)    PHACOEMULSIFICATION, CATARACT Right 2012    [facility]LR    TOTAL ABDOMINAL HYSTERECTOMY  1982    TOTAL KNEE ARTHROPLASTY  2006    left    TRIGGER FINGER RELEASE  2009     Family History   Problem Relation Age of Onset    Diabetes Mother     Hypertension Mother     Heart disease Father     Diabetes Sister     Thyroid disease Brother     Diabetes Brother     Hypertension Brother     No Known Problems Daughter     No Known Problems Son     No Known Problems Daughter     Amblyopia Neg Hx     Blindness Neg Hx     Glaucoma Neg Hx     Macular degeneration Neg Hx     Retinal detachment Neg Hx     Strabismus Neg Hx     Colon  cancer Neg Hx     Esophageal cancer Neg Hx      Social History     Tobacco Use    Smoking status: Never Smoker    Smokeless tobacco: Never Used   Substance Use Topics    Alcohol use: No    Drug use: No     Review of Systems   Constitutional: Negative for chills and fever.   HENT: Negative for sore throat.    Eyes: Negative for visual disturbance.   Respiratory: Negative for shortness of breath.    Cardiovascular: Positive for leg swelling. Negative for chest pain.   Gastrointestinal: Positive for nausea. Negative for abdominal pain, diarrhea and vomiting.   Genitourinary: Negative for dysuria.   Musculoskeletal: Negative for back pain.   Skin: Negative for rash.   Neurological: Positive for headaches.       Physical Exam     Initial Vitals [05/23/19 1215]   BP Pulse Resp Temp SpO2   (!) 143/67 94 20 98.8 °F (37.1 °C) 98 %      MAP       --         Physical Exam    Nursing note and vitals reviewed.  Constitutional: No distress.   Obese.    HENT:   Head: Normocephalic and atraumatic.   Right Ear: External ear normal.   Left Ear: External ear normal.   Mouth/Throat: Oropharynx is clear and moist. No oropharyngeal exudate.   Eyes: EOM are normal. Pupils are equal, round, and reactive to light.   No photophobia.   Neck: Normal range of motion. Neck supple.   No meningismus.   Cardiovascular: Normal rate, regular rhythm and normal heart sounds. Exam reveals no gallop and no friction rub.    No murmur heard.  Pulmonary/Chest: Breath sounds normal. No respiratory distress. She has no wheezes. She has no rhonchi. She has no rales.   Abdominal: Soft. She exhibits no distension. There is no tenderness.   Musculoskeletal: Normal range of motion. She exhibits no tenderness.   Trace edema bilaterally.    Neurological: She is alert and oriented to person, place, and time. She has normal strength. No cranial nerve deficit or sensory deficit.   Skin: Skin is warm and dry. No rash noted. No erythema.   Psychiatric: Her behavior  is normal. Thought content normal.         ED Course   Procedures  Labs Reviewed   CBC W/ AUTO DIFFERENTIAL - Abnormal; Notable for the following components:       Result Value    RBC 3.90 (*)     Hemoglobin 11.3 (*)     Hematocrit 34.5 (*)     All other components within normal limits   COMPREHENSIVE METABOLIC PANEL - Abnormal; Notable for the following components:    Sodium 133 (*)     Potassium 5.2 (*)     Glucose 574 (*)     BUN, Bld 28 (*)     Creatinine 2.1 (*)     Calcium 10.7 (*)     Alkaline Phosphatase 157 (*)     eGFR if  27.1 (*)     eGFR if non  23.5 (*)     All other components within normal limits   URINALYSIS, REFLEX TO URINE CULTURE - Abnormal; Notable for the following components:    Glucose, UA 3+ (*)     Ketones, UA 1+ (*)     All other components within normal limits    Narrative:     Preferred Collection Type->Urine, Clean Catch   BETA - HYDROXYBUTYRATE, SERUM - Abnormal; Notable for the following components:    Beta-Hydroxybutyrate 0.6 (*)     All other components within normal limits   BASIC METABOLIC PANEL - Abnormal; Notable for the following components:    CO2 22 (*)     Glucose 355 (*)     BUN, Bld 24 (*)     Creatinine 1.7 (*)     eGFR if  35.0 (*)     eGFR if non  30.3 (*)     All other components within normal limits   POCT GLUCOSE - Abnormal; Notable for the following components:    POCT Glucose 375 (*)     All other components within normal limits   ISTAT PROCEDURE - Abnormal; Notable for the following components:    POC PH 7.265 (*)     POC PCO2 53.7 (*)     POC PO2 22 (*)     POC SATURATED O2 28 (*)     All other components within normal limits   POCT GLUCOSE - Abnormal; Notable for the following components:    POCT Glucose 403 (*)     All other components within normal limits   POCT GLUCOSE - Abnormal; Notable for the following components:    POCT Glucose 306 (*)     All other components within normal limits   LACTIC  ACID, PLASMA   URINALYSIS MICROSCOPIC    Narrative:     Preferred Collection Type->Urine, Clean Catch          Imaging Results          X-Ray Chest PA And Lateral (Final result)  Result time 05/23/19 13:40:03    Final result by Travis Dalal MD (05/23/19 13:40:03)                 Impression:      No detrimental change or radiographic acute intrathoracic process seen.      Electronically signed by: Travis Dalal MD  Date:    05/23/2019  Time:    13:40             Narrative:    EXAMINATION:  XR CHEST PA AND LATERAL    CLINICAL HISTORY:  Hyperglycemia, unspecified    TECHNIQUE:  PA and lateral views of the chest were performed.    COMPARISON:  Chest radiograph 04/09/2019    FINDINGS:  No detrimental change.The lungs are clear, with normal appearance of pulmonary vasculature and no pleural effusion or pneumothorax.    The cardiac silhouette is normal in size. The hilar and mediastinal contours are unremarkable.    Osseous structures appear stable without acute process seen.  Resolution is somewhat limited by body habitus with underpenetration.                                 Medical Decision Making:   History:   Old Medical Records: I decided to obtain old medical records.  Old Records Summarized: other records.       <> Summary of Records: Pt had an office visit with ENT on may 14th where she was seeing them for otitis media and ear pain. She also complained of hearing loss. Prior to that on, may 8th, she had an office visit with endocrinology which was a follow up for her diabetes. At bedtime her sugars were running well controlled.   Initial Assessment:   69 y/o f with h/o DM complains of HA and sugar running high.  Though she had chills and sweats recently, no obvious source in her history of infection.  Recently treated for otitis but this seems to have resolved. Will check a chemistry and for ketones to rule out DKA.  Will check UA to rule out UTI as a cause for hyperglycemia.  Will hydrate.  Independently  Interpreted Test(s):   I have ordered and independently interpreted X-rays - see prior notes.  Clinical Tests:   Lab Tests: Ordered and Reviewed  Radiological Study: Ordered and Reviewed            Scribe Attestation:   Scribe #1: I performed the above scribed service and the documentation accurately describes the services I performed. I attest to the accuracy of the note.    Attending Attestation:           Physician Attestation for Scribe:      Comments: I, Dr. Isabell Rosenthal, personally performed the services described in this documentation. All medical record entries made by the scribe were at my direction and in my presence.  I have reviewed the chart and agree that the record reflects my personal performance and is accurate and complete. Isabell Rosenthal MD      Attending ED Notes:   At the end of my shift, labs had just returned.  I am signing over her care to my colleague Dr. Dixon who will continue her care.             Clinical Impression:       ICD-10-CM ICD-9-CM   1. Diabetic ketoacidosis without coma associated with other specified diabetes mellitus E13.10 250.12   2. Hyperglycemia R73.9 790.29   3. Type 2 diabetes mellitus with diabetic polyneuropathy, with long-term current use of insulin E11.42 250.60    Z79.4 357.2     V58.67   4. History of stroke Z86.73 V12.54   5. Type 2 diabetes mellitus with diabetic polyneuropathy E11.42 250.60     357.2   6. Diabetic ketoacidosis without coma associated with type 2 diabetes mellitus E11.10 250.12                                Isabell Rosenthal MD  05/27/19 7513

## 2019-05-24 PROBLEM — E66.813 CLASS 3 SEVERE OBESITY IN ADULT: Status: ACTIVE | Noted: 2019-05-24

## 2019-05-24 PROBLEM — E66.01 CLASS 3 SEVERE OBESITY IN ADULT: Status: ACTIVE | Noted: 2019-05-24

## 2019-05-24 LAB
ANION GAP SERPL CALC-SCNC: 10 MMOL/L (ref 8–16)
ANION GAP SERPL CALC-SCNC: 7 MMOL/L (ref 8–16)
ANION GAP SERPL CALC-SCNC: 8 MMOL/L (ref 8–16)
ANION GAP SERPL CALC-SCNC: 9 MMOL/L (ref 8–16)
BASOPHILS # BLD AUTO: 0.02 K/UL (ref 0–0.2)
BASOPHILS NFR BLD: 0.3 % (ref 0–1.9)
BUN SERPL-MCNC: 16 MG/DL (ref 8–23)
BUN SERPL-MCNC: 17 MG/DL (ref 8–23)
BUN SERPL-MCNC: 20 MG/DL (ref 8–23)
BUN SERPL-MCNC: 21 MG/DL (ref 8–23)
CALCIUM SERPL-MCNC: 10.1 MG/DL (ref 8.7–10.5)
CALCIUM SERPL-MCNC: 9.5 MG/DL (ref 8.7–10.5)
CALCIUM SERPL-MCNC: 9.8 MG/DL (ref 8.7–10.5)
CALCIUM SERPL-MCNC: 9.9 MG/DL (ref 8.7–10.5)
CHLORIDE SERPL-SCNC: 106 MMOL/L (ref 95–110)
CHLORIDE SERPL-SCNC: 108 MMOL/L (ref 95–110)
CO2 SERPL-SCNC: 22 MMOL/L (ref 23–29)
CO2 SERPL-SCNC: 24 MMOL/L (ref 23–29)
CO2 SERPL-SCNC: 25 MMOL/L (ref 23–29)
CO2 SERPL-SCNC: 26 MMOL/L (ref 23–29)
CREAT SERPL-MCNC: 1.4 MG/DL (ref 0.5–1.4)
CREAT SERPL-MCNC: 1.5 MG/DL (ref 0.5–1.4)
DIFFERENTIAL METHOD: ABNORMAL
EOSINOPHIL # BLD AUTO: 0.3 K/UL (ref 0–0.5)
EOSINOPHIL NFR BLD: 5.5 % (ref 0–8)
ERYTHROCYTE [DISTWIDTH] IN BLOOD BY AUTOMATED COUNT: 13.9 % (ref 11.5–14.5)
EST. GFR  (AFRICAN AMERICAN): 40.7 ML/MIN/1.73 M^2
EST. GFR  (AFRICAN AMERICAN): 44.2 ML/MIN/1.73 M^2
EST. GFR  (NON AFRICAN AMERICAN): 35.3 ML/MIN/1.73 M^2
EST. GFR  (NON AFRICAN AMERICAN): 38.4 ML/MIN/1.73 M^2
GLUCOSE SERPL-MCNC: 111 MG/DL (ref 70–110)
GLUCOSE SERPL-MCNC: 226 MG/DL (ref 70–110)
GLUCOSE SERPL-MCNC: 260 MG/DL (ref 70–110)
GLUCOSE SERPL-MCNC: 281 MG/DL (ref 70–110)
HCT VFR BLD AUTO: 29.9 % (ref 37–48.5)
HGB BLD-MCNC: 9.7 G/DL (ref 12–16)
IMM GRANULOCYTES # BLD AUTO: 0.02 K/UL (ref 0–0.04)
IMM GRANULOCYTES NFR BLD AUTO: 0.3 % (ref 0–0.5)
LYMPHOCYTES # BLD AUTO: 1.8 K/UL (ref 1–4.8)
LYMPHOCYTES NFR BLD: 31.1 % (ref 18–48)
MAGNESIUM SERPL-MCNC: 1.3 MG/DL (ref 1.6–2.6)
MCH RBC QN AUTO: 29.1 PG (ref 27–31)
MCHC RBC AUTO-ENTMCNC: 32.4 G/DL (ref 32–36)
MCV RBC AUTO: 90 FL (ref 82–98)
MONOCYTES # BLD AUTO: 0.7 K/UL (ref 0.3–1)
MONOCYTES NFR BLD: 11.4 % (ref 4–15)
NEUTROPHILS # BLD AUTO: 3 K/UL (ref 1.8–7.7)
NEUTROPHILS NFR BLD: 51.4 % (ref 38–73)
NRBC BLD-RTO: 0 /100 WBC
PHOSPHATE SERPL-MCNC: 2.9 MG/DL (ref 2.7–4.5)
PLATELET # BLD AUTO: 220 K/UL (ref 150–350)
PMV BLD AUTO: 10.8 FL (ref 9.2–12.9)
POCT GLUCOSE: 115 MG/DL (ref 70–110)
POCT GLUCOSE: 118 MG/DL (ref 70–110)
POCT GLUCOSE: 147 MG/DL (ref 70–110)
POCT GLUCOSE: 184 MG/DL (ref 70–110)
POCT GLUCOSE: 207 MG/DL (ref 70–110)
POCT GLUCOSE: 212 MG/DL (ref 70–110)
POCT GLUCOSE: 227 MG/DL (ref 70–110)
POCT GLUCOSE: 227 MG/DL (ref 70–110)
POCT GLUCOSE: 229 MG/DL (ref 70–110)
POCT GLUCOSE: 245 MG/DL (ref 70–110)
POCT GLUCOSE: 249 MG/DL (ref 70–110)
POCT GLUCOSE: 249 MG/DL (ref 70–110)
POCT GLUCOSE: 258 MG/DL (ref 70–110)
POCT GLUCOSE: 261 MG/DL (ref 70–110)
POCT GLUCOSE: 262 MG/DL (ref 70–110)
POCT GLUCOSE: 281 MG/DL (ref 70–110)
POCT GLUCOSE: >500 MG/DL (ref 70–110)
POTASSIUM SERPL-SCNC: 3.8 MMOL/L (ref 3.5–5.1)
POTASSIUM SERPL-SCNC: 4 MMOL/L (ref 3.5–5.1)
POTASSIUM SERPL-SCNC: 4 MMOL/L (ref 3.5–5.1)
POTASSIUM SERPL-SCNC: 4.3 MMOL/L (ref 3.5–5.1)
RBC # BLD AUTO: 3.33 M/UL (ref 4–5.4)
SODIUM SERPL-SCNC: 138 MMOL/L (ref 136–145)
SODIUM SERPL-SCNC: 143 MMOL/L (ref 136–145)
WBC # BLD AUTO: 5.79 K/UL (ref 3.9–12.7)

## 2019-05-24 PROCEDURE — 84100 ASSAY OF PHOSPHORUS: CPT

## 2019-05-24 PROCEDURE — 63600175 PHARM REV CODE 636 W HCPCS: Performed by: STUDENT IN AN ORGANIZED HEALTH CARE EDUCATION/TRAINING PROGRAM

## 2019-05-24 PROCEDURE — 25000003 PHARM REV CODE 250: Performed by: STUDENT IN AN ORGANIZED HEALTH CARE EDUCATION/TRAINING PROGRAM

## 2019-05-24 PROCEDURE — 36415 COLL VENOUS BLD VENIPUNCTURE: CPT

## 2019-05-24 PROCEDURE — 87040 BLOOD CULTURE FOR BACTERIA: CPT | Mod: 59

## 2019-05-24 PROCEDURE — 20600001 HC STEP DOWN PRIVATE ROOM

## 2019-05-24 PROCEDURE — S5571 INSULIN DISPOS PEN 3 ML: HCPCS | Performed by: NURSE PRACTITIONER

## 2019-05-24 PROCEDURE — 99223 PR INITIAL HOSPITAL CARE,LEVL III: ICD-10-PCS | Mod: AI,GC,, | Performed by: HOSPITALIST

## 2019-05-24 PROCEDURE — 85025 COMPLETE CBC W/AUTO DIFF WBC: CPT

## 2019-05-24 PROCEDURE — 99222 1ST HOSP IP/OBS MODERATE 55: CPT | Mod: ,,, | Performed by: NURSE PRACTITIONER

## 2019-05-24 PROCEDURE — 99223 1ST HOSP IP/OBS HIGH 75: CPT | Mod: AI,GC,, | Performed by: HOSPITALIST

## 2019-05-24 PROCEDURE — 63600175 PHARM REV CODE 636 W HCPCS: Performed by: EMERGENCY MEDICINE

## 2019-05-24 PROCEDURE — S5010 5% DEXTROSE AND 0.45% SALINE: HCPCS | Performed by: STUDENT IN AN ORGANIZED HEALTH CARE EDUCATION/TRAINING PROGRAM

## 2019-05-24 PROCEDURE — 99222 PR INITIAL HOSPITAL CARE,LEVL II: ICD-10-PCS | Mod: ,,, | Performed by: NURSE PRACTITIONER

## 2019-05-24 PROCEDURE — S5571 INSULIN DISPOS PEN 3 ML: HCPCS | Performed by: STUDENT IN AN ORGANIZED HEALTH CARE EDUCATION/TRAINING PROGRAM

## 2019-05-24 PROCEDURE — 25000003 PHARM REV CODE 250: Performed by: EMERGENCY MEDICINE

## 2019-05-24 PROCEDURE — 63600175 PHARM REV CODE 636 W HCPCS: Performed by: NURSE PRACTITIONER

## 2019-05-24 PROCEDURE — 83735 ASSAY OF MAGNESIUM: CPT

## 2019-05-24 PROCEDURE — 80048 BASIC METABOLIC PNL TOTAL CA: CPT | Mod: 91

## 2019-05-24 RX ORDER — POTASSIUM CHLORIDE 750 MG/1
40 CAPSULE, EXTENDED RELEASE ORAL ONCE
Status: COMPLETED | OUTPATIENT
Start: 2019-05-24 | End: 2019-05-24

## 2019-05-24 RX ORDER — SODIUM CHLORIDE 9 MG/ML
INJECTION, SOLUTION INTRAVENOUS CONTINUOUS
Status: DISCONTINUED | OUTPATIENT
Start: 2019-05-24 | End: 2019-05-24

## 2019-05-24 RX ORDER — DEXTROSE MONOHYDRATE AND SODIUM CHLORIDE 5; .45 G/100ML; G/100ML
INJECTION, SOLUTION INTRAVENOUS CONTINUOUS
Status: DISCONTINUED | OUTPATIENT
Start: 2019-05-24 | End: 2019-05-24

## 2019-05-24 RX ORDER — INSULIN ASPART 100 [IU]/ML
7 INJECTION, SOLUTION INTRAVENOUS; SUBCUTANEOUS
Status: DISCONTINUED | OUTPATIENT
Start: 2019-05-24 | End: 2019-05-24

## 2019-05-24 RX ORDER — INSULIN ASPART 100 [IU]/ML
16 INJECTION, SOLUTION INTRAVENOUS; SUBCUTANEOUS ONCE
Status: DISCONTINUED | OUTPATIENT
Start: 2019-05-24 | End: 2019-05-24

## 2019-05-24 RX ORDER — ALLOPURINOL 100 MG/1
300 TABLET ORAL DAILY
Status: ON HOLD | COMMUNITY
End: 2019-05-26 | Stop reason: HOSPADM

## 2019-05-24 RX ORDER — INSULIN ASPART 100 [IU]/ML
13 INJECTION, SOLUTION INTRAVENOUS; SUBCUTANEOUS ONCE
Status: COMPLETED | OUTPATIENT
Start: 2019-05-24 | End: 2019-05-24

## 2019-05-24 RX ORDER — IBUPROFEN 200 MG
24 TABLET ORAL
Status: DISCONTINUED | OUTPATIENT
Start: 2019-05-24 | End: 2019-05-26 | Stop reason: HOSPADM

## 2019-05-24 RX ORDER — GLUCAGON 1 MG
1 KIT INJECTION
Status: DISCONTINUED | OUTPATIENT
Start: 2019-05-24 | End: 2019-05-26 | Stop reason: HOSPADM

## 2019-05-24 RX ORDER — INSULIN ASPART 100 [IU]/ML
23 INJECTION, SOLUTION INTRAVENOUS; SUBCUTANEOUS
Status: DISCONTINUED | OUTPATIENT
Start: 2019-05-25 | End: 2019-05-24

## 2019-05-24 RX ORDER — INSULIN ASPART 100 [IU]/ML
0-5 INJECTION, SOLUTION INTRAVENOUS; SUBCUTANEOUS
Status: DISCONTINUED | OUTPATIENT
Start: 2019-05-24 | End: 2019-05-24

## 2019-05-24 RX ORDER — MULTIVITAMIN
1 TABLET ORAL DAILY
COMMUNITY

## 2019-05-24 RX ORDER — MAGNESIUM SULFATE HEPTAHYDRATE 40 MG/ML
2 INJECTION, SOLUTION INTRAVENOUS
Status: COMPLETED | OUTPATIENT
Start: 2019-05-24 | End: 2019-05-24

## 2019-05-24 RX ORDER — INSULIN ASPART 100 [IU]/ML
1-10 INJECTION, SOLUTION INTRAVENOUS; SUBCUTANEOUS
Status: DISCONTINUED | OUTPATIENT
Start: 2019-05-24 | End: 2019-05-26 | Stop reason: HOSPADM

## 2019-05-24 RX ORDER — INSULIN ASPART 100 [IU]/ML
20 INJECTION, SOLUTION INTRAVENOUS; SUBCUTANEOUS
Status: DISCONTINUED | OUTPATIENT
Start: 2019-05-25 | End: 2019-05-26 | Stop reason: HOSPADM

## 2019-05-24 RX ORDER — IBUPROFEN 200 MG
16 TABLET ORAL
Status: DISCONTINUED | OUTPATIENT
Start: 2019-05-24 | End: 2019-05-26 | Stop reason: HOSPADM

## 2019-05-24 RX ADMIN — MAGNESIUM SULFATE IN WATER 2 G: 40 INJECTION, SOLUTION INTRAVENOUS at 11:05

## 2019-05-24 RX ADMIN — ENOXAPARIN SODIUM 40 MG: 100 INJECTION SUBCUTANEOUS at 05:05

## 2019-05-24 RX ADMIN — INSULIN ASPART 1 UNITS: 100 INJECTION, SOLUTION INTRAVENOUS; SUBCUTANEOUS at 10:05

## 2019-05-24 RX ADMIN — INSULIN DETEMIR 30 UNITS: 100 INJECTION, SOLUTION SUBCUTANEOUS at 08:05

## 2019-05-24 RX ADMIN — DEXTROSE AND SODIUM CHLORIDE: 5; .45 INJECTION, SOLUTION INTRAVENOUS at 05:05

## 2019-05-24 RX ADMIN — SODIUM CHLORIDE 2.2 UNITS/HR: 9 INJECTION, SOLUTION INTRAVENOUS at 09:05

## 2019-05-24 RX ADMIN — AMLODIPINE BESYLATE 10 MG: 10 TABLET ORAL at 09:05

## 2019-05-24 RX ADMIN — WHITE PETROLATUM: 1.75 OINTMENT TOPICAL at 11:05

## 2019-05-24 RX ADMIN — PREGABALIN 100 MG: 75 CAPSULE ORAL at 09:05

## 2019-05-24 RX ADMIN — INSULIN DETEMIR 20 UNITS: 100 INJECTION, SOLUTION SUBCUTANEOUS at 11:05

## 2019-05-24 RX ADMIN — INSULIN ASPART 13 UNITS: 100 INJECTION, SOLUTION INTRAVENOUS; SUBCUTANEOUS at 06:05

## 2019-05-24 RX ADMIN — INSULIN ASPART 7 UNITS: 100 INJECTION, SOLUTION INTRAVENOUS; SUBCUTANEOUS at 05:05

## 2019-05-24 RX ADMIN — LABETALOL HYDROCHLORIDE 300 MG: 100 TABLET, FILM COATED ORAL at 09:05

## 2019-05-24 RX ADMIN — FAMOTIDINE 20 MG: 20 TABLET, FILM COATED ORAL at 09:05

## 2019-05-24 RX ADMIN — ALLOPURINOL 300 MG: 300 TABLET ORAL at 09:05

## 2019-05-24 RX ADMIN — SIMVASTATIN 20 MG: 20 TABLET, FILM COATED ORAL at 08:05

## 2019-05-24 RX ADMIN — DULOXETINE 30 MG: 30 CAPSULE, DELAYED RELEASE ORAL at 09:05

## 2019-05-24 RX ADMIN — SODIUM CHLORIDE 2.8 UNITS/HR: 9 INJECTION, SOLUTION INTRAVENOUS at 10:05

## 2019-05-24 RX ADMIN — PREGABALIN 100 MG: 75 CAPSULE ORAL at 08:05

## 2019-05-24 RX ADMIN — MAGNESIUM SULFATE IN WATER 2 G: 40 INJECTION, SOLUTION INTRAVENOUS at 09:05

## 2019-05-24 RX ADMIN — POTASSIUM CHLORIDE 40 MEQ: 750 CAPSULE, EXTENDED RELEASE ORAL at 11:05

## 2019-05-24 RX ADMIN — LABETALOL HYDROCHLORIDE 300 MG: 100 TABLET, FILM COATED ORAL at 08:05

## 2019-05-24 RX ADMIN — MAGNESIUM SULFATE IN WATER 2 G: 40 INJECTION, SOLUTION INTRAVENOUS at 07:05

## 2019-05-24 RX ADMIN — ASPIRIN 81 MG CHEWABLE TABLET 81 MG: 81 TABLET CHEWABLE at 09:05

## 2019-05-24 RX ADMIN — SODIUM CHLORIDE 2.3 UNITS/HR: 9 INJECTION, SOLUTION INTRAVENOUS at 03:05

## 2019-05-24 RX ADMIN — SODIUM CHLORIDE: 0.9 INJECTION, SOLUTION INTRAVENOUS at 11:05

## 2019-05-24 RX ADMIN — INSULIN ASPART 3 UNITS: 100 INJECTION, SOLUTION INTRAVENOUS; SUBCUTANEOUS at 05:05

## 2019-05-24 RX ADMIN — SODIUM CHLORIDE 2 UNITS/HR: 9 INJECTION, SOLUTION INTRAVENOUS at 01:05

## 2019-05-24 NOTE — HPI
Reason for Consult: Management of T2DM, Hyperglycemia     Surgical Procedure and Date: n/a    Diabetes diagnosis year: 1987    Home Diabetes Medications:  U500 pens 150 (130) units with breakfast, 100 (90) units with lunch, and 75 (65) units with dinner - doses based on size of meal   victoza 1.2 mg daily   Lab Results   Component Value Date    HGBA1C 9.9 (H) 05/01/2019         How often checking glucose at home? 4-5 times a day   BG readings on regimen: 100-200s ( reading high night before coming into hospital)  Hypoglycemia on the regimen?  Yes about once every couple of weeks   Missed doses on regimen?  No    Diabetes Complications include:     Hyperglycemia, Hypoglycemia , Diabetic nephropathy  , Diabetic retinopathy  and Diabetic peripheral neuropathy     Complicating diabetes co morbidities:   CHF and CKD      HPI:   Patient is a 68 y.o. female with a diagnosis of poorly controlled type 2 DM on U500 insulin. Last seen in endocrine clinic by Ursula Salinas NP on 5/9/19. Also with RUFINA, CHF, CKD, HTN, and HLD. Insulin being titrated up outpatient over last month. BG reasonably well controlled at last visit. Patient endorses migraine, blurry vision, and increased urination. BG meter reading high night before presented to ED.

## 2019-05-24 NOTE — PROGRESS NOTES
Patient arrived to the floor via stretcher from ED. IV pole with insulin drip infusing at 6 units/hour. Patient AAO x 4. VSS. Tele in place with NSR. No c/o pain/distress. Pt c/o hunger. Pt is NPO. Daughter @ bedside. Will start IVF. Will check CBG hourly and closely monitor pt.

## 2019-05-24 NOTE — PLAN OF CARE
Problem: Adult Inpatient Plan of Care  Goal: Plan of Care Review  Outcome: Ongoing (interventions implemented as appropriate)  Recommendations    Recommendation:   1. When medically able, advance diet to 2000 kcal diabetic diet   2. Encourage diet compliance   RD to follow    Goals: pt to meet >75% of EEN and EPN by RD follow up   Nutrition Goal Status: new  Communication of RD Recs: other (comment)(POC)

## 2019-05-24 NOTE — ASSESSMENT & PLAN NOTE
-Blood sugar on arrival 574 with an anion gap of 13 and pH 7.2 and betahydroxy of 0.6.   -Likely induced by Diet noncompliance, looks like she has been getting hyperglycemic slowly   -Home DM regimen is victoza, and SSI and humulin   - despite closed gap at this time in biochemically confirmed DKA, with continue with insulin drip and fluids to avoid reopening gap especially since her poct glucose is fluctuating so much   - additionally it will help determining the 24/hr requirement     Plan   - Start insulin gtt 6 u/hr with protocol followed  with q1h accuchecks while on the drip.   -  Once blood sugar is <250, bicarb >18 and anion gap is closed, transition to subq insulin with a 1-2 h overlap with drip.   -  After gap closes : Will need Detemir (0.25mg/kg) daily and Aspart (0.08mg/kg) units per meal with SSI and then change accuchecks 4 times daily before meals and at bedtime (AC and HS)  - Start 0.45 normal saline + K  at 150cc/hr.  Once blood sugar is 200, will  change IVF to D5 1/2 NS and decrease rate   - Will Monitor electrolytes with BMP and Mag q4h while on insulin gtt and place on tele  - NPO while on insulin gtt then Diabetic diet when on subq insulin.  - Hold home meds for now as she is nauseated

## 2019-05-24 NOTE — PLAN OF CARE
Problem: Adult Inpatient Plan of Care  Goal: Plan of Care Review  Outcome: Ongoing (interventions implemented as appropriate)  POC reviewed with patient and daughter; understanding verbalized. D5 1/2 NS @ 150/hr. Titratable insulin drip infusing. Current rate @ 3.6 units/hr. Pt remains up with walker to bathroom. Free from falls and injuries this shift. Tele in place, with normal sinus rhythm. Pt NPO, except for one time @ 0230, able to eat a small sandwich and drink some water. No c/o N/V. Accuchecks q 1 hour. Pt voids clear yellow urine in hat via bathroom toilet. No BM's this shift. Pt. with nonskid footwear on, bed in lowest position, and locked with bed rails up x 2. Pt. has call light and personal items within reach. Daughter @ bedside. VSS and afebrile this shift. All questions and concerns addressed at this time. Will continue to closely monitor.

## 2019-05-24 NOTE — SUBJECTIVE & OBJECTIVE
Past Medical History:   Diagnosis Date    Allergy     Anemia 2012    Arthritis     CHF (congestive heart failure)     CKD (chronic kidney disease) stage 3, GFR 30-59 ml/min 2012    Clotting disorder     Colon polyp     Deep vein thrombophlebitis of left leg 2012    Degenerative disc disease     Diabetic peripheral neuropathy associated with type 2 diabetes mellitus 2012    GERD (gastroesophageal reflux disease)     HTN (hypertension) 2012    Hyperlipidemia 2012    Lead-induced gout of ankle or foot     right going on three weeks    Nonproliferative diabetic retinopathy of right eye 2012    Obstructive sleep apnea 2012    Osteoporosis     Proliferative diabetic retinopathy of left eye 2012    Stroke 2003    Type 2 diabetes mellitus with diabetic polyneuropathy 2012    Ulcer        Past Surgical History:   Procedure Laterality Date    CATARACT EXTRACTION W/  INTRAOCULAR LENS IMPLANT Left 3/2002    left     CATARACT EXTRACTION W/  INTRAOCULAR LENS IMPLANT Right     OD dr. olivares     SECTION      COLONOSCOPY N/A 2018    Performed by [facility] at [facility] (2ND FLR)    CYST REMOVAL  2011    left side of face    ESOPHAGOGASTRODUODENOSCOPY (EGD) N/A 2018    Performed by [facility] at [facility] (2ND FLR)    EYE SURGERY Bilateral 2012    eye implants    GANGLION CYST EXCISION  2007    Right wrist    INSERTION, IOL PROSTHESIS Right 2012    Performed by [facility] at [facility] OR 1ST FLR    Pan Retinal Photocoagulation Bilateral     Dr. Monterroso (Proliferative Diabetic Retinopathy)    PHACOEMULSIFICATION, CATARACT Right 2012    Performed [facility] at [facility] OR 1ST FLR    TOTAL ABDOMINAL HYSTERECTOMY  1982    TOTAL KNEE ARTHROPLASTY  2006    left    TRIGGER FINGER RELEASE  2009       Review of patient's allergies indicates:   Allergen  "Reactions    Iodinated contrast- oral and iv dye Rash       Current Facility-Administered Medications on File Prior to Encounter   Medication    triamcinolone acetonide injection 10 mg     Current Outpatient Medications on File Prior to Encounter   Medication Sig    allopurinol (ZYLOPRIM) 300 MG tablet Take 1 tablet (300 mg total) by mouth once daily.    amLODIPine (NORVASC) 10 MG tablet Take 1 tablet (10 mg total) by mouth once daily.    aspirin 81 MG Chew Take 1 tablet (81 mg total) by mouth once daily.    BD ULTRA-FINE KIKA PEN NEEDLE 32 gauge x 5/32" Ndle To use 4 times daily    blood-glucose meter kit Use as instructed, true test/result meter    candesartan (ATACAND) 16 MG tablet Take 1 tablet (16 mg total) by mouth once daily.    DULoxetine (CYMBALTA) 30 MG capsule Take 1 capsule (30 mg total) by mouth once daily.    famotidine (PEPCID) 20 MG tablet Take 1 tablet (20 mg total) by mouth 2 (two) times daily.    fexofenadine (ALLEGRA) 180 MG tablet TAKE 1 TABLET BY MOUTH ONCE DAILY    finasteride (PROSCAR) 5 mg tablet TAKE 1 TABLET(5 MG) BY MOUTH EVERY DAY    furosemide (LASIX) 20 MG tablet Take 1 tablet (20 mg total) by mouth once daily. Take an extra pill if short of breath or leg swelling    insulin regular hum U-500 conc (HUMULIN R U-500, CONC, KWIKPEN) 500 unit/mL (3 mL) InPn Inject  Units into the skin 3 (three) times daily before meals. Take 115u with breakfast,85u with lunch, and 50u with dinner. (Patient taking differently: Inject  Units into the skin 3 (three) times daily before meals. Take 115u with breakfast,140 with large and 120u with small.  100 unit with small 90 with large. Supper 65 large 50 small.)    labetalol (NORMODYNE) 300 MG tablet Take 1 tablet (300 mg total) by mouth 2 (two) times daily.    lancets 31 gauge Misc 1 lancet by Misc.(Non-Drug; Combo Route) route 4 (four) times daily.    liraglutide 0.6 mg/0.1 mL, 18 mg/3 mL, subq PNIJ 0.6 mg/0.1 mL (18 mg/3 mL) " PnIj Inject 0.6 mg daily into the skin for 2 weeks then increase to 1.2 mg thereafter daily.    pregabalin (LYRICA) 100 MG capsule TAKE 1 CAPSULE BY MOUTH TWO TIMES DAILY    simvastatin (ZOCOR) 40 MG tablet Take 0.5 tablets (20 mg total) by mouth every evening.    colchicine (COLCRYS) 0.6 mg tablet Take 0.6 mg by mouth as needed.    fluocinonide (LIDEX) 0.05 % external solution AAA scalp qday - bid prn pruritus    HYDROcodone-acetaminophen (NORCO)  mg per tablet Take 1 tablet by mouth every 6 (six) hours as needed for Pain.    ketoconazole (NIZORAL) 2 % shampoo Wash hair with medicated shampoo at least 2x/week - let sit on scalp at least 5 minutes prior to rinsing    triamcinolone acetonide 0.1% (KENALOG) 0.1 % ointment AAA bid hands    TRUETEST TEST STRIPS Strp 1 strip by Misc.(Non-Drug; Combo Route) route 4 (four) times daily.     Family History     Problem Relation (Age of Onset)    Diabetes Mother, Sister, Brother    Heart disease Father    Hypertension Mother, Brother    No Known Problems Daughter, Son, Daughter    Thyroid disease Brother        Tobacco Use    Smoking status: Never Smoker    Smokeless tobacco: Never Used   Substance and Sexual Activity    Alcohol use: No    Drug use: No    Sexual activity: Yes     Partners: Male     Review of Systems   Constitutional: Positive for appetite change and chills. Negative for activity change, fatigue and fever.   HENT: Negative for congestion and sore throat.    Eyes: Negative for photophobia and visual disturbance.   Respiratory: Negative for shortness of breath, wheezing and stridor.    Cardiovascular: Positive for leg swelling. Negative for chest pain.   Gastrointestinal: Positive for nausea. Negative for abdominal distention, diarrhea and vomiting.   Endocrine: Positive for polydipsia, polyphagia and polyuria.   Genitourinary: Positive for frequency and urgency. Negative for difficulty urinating and dysuria.   Musculoskeletal: Negative for  arthralgias, back pain and gait problem.   Skin: Negative for color change, pallor, rash and wound.   Neurological: Positive for headaches. Negative for syncope and light-headedness.   Psychiatric/Behavioral: Negative for agitation.     Objective:     Vital Signs (Most Recent):  Temp: 98.3 °F (36.8 °C) (05/23/19 2051)  Pulse: 93 (05/23/19 2051)  Resp: 15 (05/23/19 1633)  BP: (!) 152/71 (05/23/19 2051)  SpO2: 99 % (05/23/19 2051) Vital Signs (24h Range):  Temp:  [98.1 °F (36.7 °C)-98.8 °F (37.1 °C)] 98.3 °F (36.8 °C)  Pulse:  [68-94] 93  Resp:  [15-20] 15  SpO2:  [98 %-99 %] 99 %  BP: (135-152)/(60-71) 152/71     Weight: 135.2 kg (298 lb)  Body mass index is 49.59 kg/m².    Physical Exam   Constitutional: She is oriented to person, place, and time. She appears well-developed and well-nourished.   HENT:   Head: Normocephalic and atraumatic.   Eyes: Pupils are equal, round, and reactive to light. EOM are normal.   Neck: Normal range of motion. Neck supple.   Cardiovascular: Normal rate and regular rhythm.   Pulmonary/Chest: Effort normal and breath sounds normal.   Abdominal: Soft. Bowel sounds are normal.   Musculoskeletal: She exhibits edema.   Neurological: She is alert and oriented to person, place, and time.   Skin: Skin is warm and dry.   Psychiatric: She has a normal mood and affect. Her behavior is normal. Judgment and thought content normal.         CRANIAL NERVES     CN III, IV, VI   Pupils are equal, round, and reactive to light.  Extraocular motions are normal.        Significant Labs:   CBC:   Recent Labs   Lab 05/23/19  1325   WBC 7.44   HGB 11.3*   HCT 34.5*        CMP:   Recent Labs   Lab 05/23/19  1325 05/23/19  1722   * 137   K 5.2* 4.2   CL 97 104   CO2 23 22*   * 355*   BUN 28* 24*   CREATININE 2.1* 1.7*   CALCIUM 10.7* 9.9   PROT 7.8  --    ALBUMIN 4.3  --    BILITOT 0.5  --    ALKPHOS 157*  --    AST 29  --    ALT 22  --    ANIONGAP 13 11   EGFRNONAA 23.5* 30.3*     Lactic  Acid:   Recent Labs   Lab 05/23/19  1335   LACTATE 2.2     Magnesium: No results for input(s): MG in the last 48 hours.  POCT Glucose:   Recent Labs   Lab 05/23/19  1610 05/23/19  1913   POCTGLUCOSE 375* 403*     Urine Studies:   Recent Labs   Lab 05/23/19  1315   COLORU Straw   APPEARANCEUA Clear   PHUR 6.0   SPECGRAV 1.020   PROTEINUA Negative   GLUCUA 3+*   KETONESU 1+*   BILIRUBINUA Negative   OCCULTUA Negative   NITRITE Negative   LEUKOCYTESUR Negative   RBCUA 1   WBCUA 0   BACTERIA None   SQUAMEPITHEL 1     All pertinent labs within the past 24 hours have been reviewed.    Significant Imaging: I have reviewed all pertinent imaging results/findings within the past 24 hours.  I have reviewed and interpreted all pertinent imaging results/findings within the past 24 hours.

## 2019-05-24 NOTE — CONSULTS
"  [facility]  Adult Nutrition  Consult Note    SUMMARY     Recommendations    Recommendation:   1. When medically able, advance diet to 2000 kcal diabetic diet   2. Encourage diet compliance   RD to follow    Goals: pt to meet >75% of EEN and EPN by RD follow up   Nutrition Goal Status: new  Communication of RD Recs: other (comment)(POC)    Reason for Assessment    Reason For Assessment: consult  Diagnosis: (DKA)  Relevant Medical History: DM; HTN; HLD; CKD; stroke   Interdisciplinary Rounds: did not attend  General Information Comments: Pt reported good appetite PTA. Currently NPO until GTT insulin is advanced. Discussed DM diet and provided handouts on diabetic diet. Pt states she works with her dietitian to figure out carb needs and checks BG 3xday. no recent wt loss reported. wt stable. Pt well nourished at this time. RD following.   Nutrition Discharge Planning: d/c on DM diet     Nutrition Risk Screen    Nutrition Risk Screen: no indicators present    Nutrition/Diet History    Spiritual, Cultural Beliefs, Jehovah's witness Practices, Values that Affect Care: no  Factors Affecting Nutritional Intake: None identified at this time, NPO    Anthropometrics    Temp: 98.1 °F (36.7 °C)  Height Method: Stated  Height: 5' 5" (165.1 cm)  Height (inches): 65 in  Weight Method: Bed Scale  Weight: (!) 142 kg (313 lb 0.9 oz)  Weight (lb): (!) 313.06 lb  Ideal Body Weight (IBW), Female: 125 lb  % Ideal Body Weight, Female (lb): 250.45 lb  BMI (Calculated): 52.2  BMI Grade: greater than 40 - morbid obesity    Lab/Procedures/Meds    Pertinent Labs Reviewed: reviewed  Pertinent Labs Comments: Glucose 226  Pertinent Medications Reviewed: reviewed  Pertinent Medications Comments: insulin     Estimated/Assessed Needs    Weight Used For Calorie Calculations: (!) 142 kg (313 lb 0.9 oz)  Energy Calorie Requirements (kcal): 2334 kcal/day   Energy Need Method: [place](x1.2)  Protein Requirements: 113-142 g/day " (0.8-1.0 g/kg)  Weight Used For Protein Calculations: (!) 142 kg (313 lb 0.9 oz)  Fluid Requirements (mL): 1 mL/kcal or per MD  RDA Method (mL): 2334    Nutrition Prescription Ordered    Current Diet Order: NPO    Evaluation of Received Nutrient/Fluid Intake    Energy Calories Required: not meeting needs  Protein Required: not meeting needs  Fluid Required: not meeting needs  Tolerance: tolerating  % Intake of Estimated Energy Needs: 0 - 25 %  % Meal Intake: NPO    Nutrition Risk    Level of Risk/Frequency of Follow-up: low(1x/week)     Assessment and Plan  Nutrition Problem  food and nutrition knowledge deficit    Related to (etiology):   DKA    Signs and Symptoms (as evidenced by):   Pt noncompliant with DM diet- glucose 226      Interventions/Recommendations (treatment strategy):  Collaboration with other providers  DM diet education     Nutrition Diagnosis Status:   New    Monitor and Evaluation    Food and Nutrient Intake: energy intake, food and beverage intake  Food and Nutrient Adminstration: diet order  Knowledge/Beliefs/Attitudes: food and nutrition knowledge/skill  Anthropometric Measurements: weight, weight change  Biochemical Data, Medical Tests and Procedures: electrolyte and renal panel, lipid profile, gastrointestinal profile, glucose/endocrine profile, inflammatory profile  Nutrition-Focused Physical Findings: overall appearance     Nutrition Follow-Up    RD Follow-up?: Yes

## 2019-05-24 NOTE — PLAN OF CARE
05/24/19 1300   Discharge Assessment   Assessment Type Discharge Planning Assessment   Confirmed/corrected address and phone number on facesheet? Yes   Assessment information obtained from? Patient   Expected Length of Stay (days) 3   Communicated expected length of stay with patient/caregiver yes   Prior to hospitilization cognitive status: Alert/Oriented   Prior to hospitalization functional status: Assistive Equipment   Current cognitive status: Alert/Oriented   Current Functional Status: Assistive Equipment   Lives With spouse   Able to Return to Prior Arrangements yes   Is patient able to care for self after discharge? Yes   Who are your caregiver(s) and their phone number(s)? Heber [phone]   Patient's perception of discharge disposition home or selfcare   Readmission Within the Last 30 Days no previous admission in last 30 days   Patient currently being followed by outpatient case management? No   Patient currently receives any other outside agency services? No   Equipment Currently Used at Home walker, rolling;cane, quad;shower chair;bedside commode;glucometer;CPAP   Do you have any problems affording any of your prescribed medications? No   Is the patient taking medications as prescribed? yes   Does the patient have transportation home? Yes   Transportation Anticipated family or friend will provide   Does the patient receive services at the [facility]? No   Discharge Plan A Home with family   Discharge Plan B Home Health   DME Needed Upon Discharge  none   Patient/Family in Agreement with Plan yes   Does the patient have transportation to healthcare appointments? Yes     Pt request New glucometer and rollator at discharge

## 2019-05-24 NOTE — ASSESSMENT & PLAN NOTE
ARF Superimposed on CKD     · Creatinine 2.1 baseline is 1.7  · Secondary to DKA    PLAN :    · Monitor Ins and Outs and daily weights.   · Avoid nephrotoxic agents such as NSAIDS, Gadolinium and IV Radiocontrast  · Renal Dose meds to current GFR/Creat Clereance.  · Will give her fluids slowly as her GEORGE is resolving and she has bilateral edema

## 2019-05-24 NOTE — CONSULTS
[facility]  Endocrinology  Diabetes Consult Note    Consult Requested by: Juana Hernandez MD   Reason for admit: Diabetic ketoacidosis without coma associated with type 2 diabetes mellitus    HISTORY OF PRESENT ILLNESS:  Reason for Consult: Management of T2DM, Hyperglycemia     Surgical Procedure and Date: n/a    Diabetes diagnosis year: 1987    Home Diabetes Medications:  U500 pens 150 (130) units with breakfast, 100 (90) units with lunch, and 75 (65) units with dinner - doses based on size of meal   victoza 1.2 mg daily   Lab Results   Component Value Date    HGBA1C 9.9 (H) 05/01/2019         How often checking glucose at home? 4-5 times a day   BG readings on regimen: 100-200s ( reading high night before coming into hospital)  Hypoglycemia on the regimen?  Yes about once every couple of weeks   Missed doses on regimen?  No    Diabetes Complications include:     Hyperglycemia, Hypoglycemia , Diabetic nephropathy  , Diabetic retinopathy  and Diabetic peripheral neuropathy     Complicating diabetes co morbidities:   CHF and CKD      HPI:   Patient is a 68 y.o. female with a diagnosis of poorly controlled type 2 DM on U500 insulin. Last seen in endocrine clinic by Ursula Salinas NP on 5/9/19. Also with RUFINA, CHF, CKD, HTN, and HLD. Insulin being titrated up outpatient over last month. BG reasonably well controlled at last visit. Patient endorses migraine, blurry vision, and increased urination. BG meter reading high night before presented to ED.             Interval HPI:   Admitted to hem/onc floor. Placed on insulin infusion rates 1.6-6 u/hr. Insulin infusion suspended per primary team with BG in 200s. Started on basal insulin.   Eating:   Advancing for dinner   Nausea: No  Hypoglycemia and intervention: No  Fever: No  TPN and/or TF: No    PMH, PSH, FH, SH  reviewed       Review of Systems   Constitutional: Negative for weight changes.  Eyes: + for visual disturbance.  Respiratory: Negative for  cough.   Cardiovascular: Negative for chest pain.  Gastrointestinal: Negative for nausea.  Endocrine: + for polyuria, polydipsia.  Musculoskeletal: Negative for back pain.  Skin: Negative for rash.  Neurological: Negative for syncope. + for headache.   Psychiatric/Behavioral: Negative for depression.      Current Medications and/or Treatments Impacting Glycemic Control  Immunotherapy:    Immunosuppressants     None        Steroids:   Hormones (From admission, onward)    None        Pressors:    Autonomic Drugs (From admission, onward)    None        Hyperglycemia/Diabetes Medications:   Antihyperglycemics (From admission, onward)    Start     Stop Route Frequency Ordered    05/25/19 0715  insulin aspart U-100 pen 20 Units      -- SubQ 3 times daily with meals 05/24/19 1756 05/24/19 2100  insulin detemir U-100 pen 30 Units      -- SubQ 2 times daily 05/24/19 1756 05/24/19 1842  insulin aspart U-100 pen 1-10 Units      -- SubQ Before meals & nightly PRN 05/24/19 1742    05/24/19 1756  insulin aspart U-100 pen 13 Units      -- SubQ Once 05/24/19 1756             PHYSICAL EXAMINATION:  Vitals:    05/24/19 1700   BP:    Pulse: 90   Resp:    Temp:      Body mass index is 52.09 kg/m².    Physical Exam   Constitutional: Well developed, well nourished, NAD.  ENT: External ears no masses with nose patent; normal hearing.  Neck: Supple; trachea midline.  Cardiovascular: Normal heart sounds, no LE edema. DP +2 bilaterally.  Lungs: Normal effort; lungs anterior bilaterally clear to auscultation.  Abdomen: Soft, no masses, no hernias.  MS: No clubbing or cyanosis of nails noted;  unable to assess gait.  Skin: No rashes, lesions, or ulcers; no nodules. Injection sites are ok. No lipo hypertropthy or atrophy.  Psychiatric: Good judgement and insight; normal mood and affect.  Neurological: Cranial nerves are grossly intact.   Foot: nails in good condition, no amputations noted.          Labs Reviewed and Include   Recent Labs    Lab 05/24/19  1238   *   CALCIUM 9.9      K 4.3   CO2 25      BUN 16   CREATININE 1.4     Lab Results   Component Value Date    WBC 5.79 05/24/2019    HGB 9.7 (L) 05/24/2019    HCT 29.9 (L) 05/24/2019    MCV 90 05/24/2019     05/24/2019     No results for input(s): TSH, FREET4 in the last 168 hours.  Lab Results   Component Value Date    HGBA1C 9.9 (H) 05/01/2019       Nutritional status:   Body mass index is 52.09 kg/m².  Lab Results   Component Value Date    ALBUMIN 4.3 05/23/2019    ALBUMIN 3.3 (L) 05/01/2019    ALBUMIN 3.6 04/09/2019     No results found for: PREALBUMIN    Estimated Creatinine Clearance: 54.5 mL/min (based on SCr of 1.4 mg/dL).    Accu-Checks  Recent Labs     05/24/19  0531 05/24/19  0625 05/24/19  0729 05/24/19  0915 05/24/19  1007 05/24/19  1053 05/24/19  1208 05/24/19  1318 05/24/19  1431 05/24/19  1718   POCTGLUCOSE 262* 249* 227* 261* 249* 258* 212* 227* 229* 281*        ASSESSMENT and PLAN    * Diabetic ketoacidosis without coma associated with type 2 diabetes mellitus  See Type 2 DM.     Type 2 diabetes mellitus with diabetic polyneuropathy, with long-term current use of insulin  BG goal 140-180 while inpatient.       Patient takes ~ 285-325 units TDD of insulin at home (BG reasonably well controlled with increased doses over last month until prior to admission per BG logs at last visit). On U500 at home.     Recommend:   Levemir 30 units BID  Novolog 20 units with meals.   (considerations with insulin dosing: based on IV insulin infusion requirements 2.2 u/hr not enough insulin 2.8 u/hr too much basal; 2.5 u/hr = 60 units of basal insulin needed and prandial 20 units for basal/pradial matching; from home dosing ~285 units TDD for lower requirements. Given U500 decreased by 50% on admission and then split 50/50 basal prandial ~70 units basal and 23 units with meals; dosing also consistent with ~0.8 units/kg dosing).   Patient will likely need up titration of  insulin given that she appears to be very insulin resistance.   BG monitoring ac/hs/0200 and moderate dose correction scale given insulin resistance.       Stage 4 chronic kidney disease  .Avoid insulin stacking and hypoglycemia.      GEORGE (acute kidney injury)  Avoid insulin stacking and hypoglycemia.        Class 3 severe obesity in adult  May increase insulin resistance.   Body mass index is 52.09 kg/m².            Plan discussed with patient and RN at bedside.     Niurka Beckett NP  Endocrinology  [facility]

## 2019-05-24 NOTE — HOSPITAL COURSE
Admitted to [facility] Medicine for mild DKA.Started on IVF, insulin drip. Correct electrolytes. Monitor BMP q6 hrs. BG improved to around 250.  Endocrine consulted to help with medication management for DM. Pt was started on levemir 20u BID which then increased to 30 units BID and also aspart 20 TID. Given patient's insurance and the cost of U500  Insulin, per endocrine recs patient was discharged on alternative regimen as Novolin 70/30 and her victosa was stopped for the time being pending her insurance status or PAP assistance for resuming that. Patient was discharged with a prescription for 3 vials of insulin for 9 days, plan for follow with PAP for covering the rest of her treatment course. She will follow up with endocrine as outpatient. Also given patient's low BP, candesartan was held, patient will f/u with PCP for BP management.

## 2019-05-24 NOTE — SUBJECTIVE & OBJECTIVE
Interval HPI:   Admitted to hem/onc floor. Placed on insulin infusion rates 1.6-6 u/hr. Insulin infusion suspended per primary team with BG in 200s. Started on basal insulin.   Eating:   Advancing for dinner   Nausea: No  Hypoglycemia and intervention: No  Fever: No  TPN and/or TF: No    PMH, PSH, FH, SH  reviewed       Review of Systems   Constitutional: Negative for weight changes.  Eyes: + for visual disturbance.  Respiratory: Negative for cough.   Cardiovascular: Negative for chest pain.  Gastrointestinal: Negative for nausea.  Endocrine: + for polyuria, polydipsia.  Musculoskeletal: Negative for back pain.  Skin: Negative for rash.  Neurological: Negative for syncope. + for headache.   Psychiatric/Behavioral: Negative for depression.      Current Medications and/or Treatments Impacting Glycemic Control  Immunotherapy:    Immunosuppressants     None        Steroids:   Hormones (From admission, onward)    None        Pressors:    Autonomic Drugs (From admission, onward)    None        Hyperglycemia/Diabetes Medications:   Antihyperglycemics (From admission, onward)    Start     Stop Route Frequency Ordered    05/25/19 0715  insulin aspart U-100 pen 20 Units      -- SubQ 3 times daily with meals 05/24/19 1756    05/24/19 2100  insulin detemir U-100 pen 30 Units      -- SubQ 2 times daily 05/24/19 1756    05/24/19 1842  insulin aspart U-100 pen 1-10 Units      -- SubQ Before meals & nightly PRN 05/24/19 1742    05/24/19 1756  insulin aspart U-100 pen 13 Units      -- SubQ Once 05/24/19 1756             PHYSICAL EXAMINATION:  Vitals:    05/24/19 1700   BP:    Pulse: 90   Resp:    Temp:      Body mass index is 52.09 kg/m².    Physical Exam   Constitutional: Well developed, well nourished, NAD.  ENT: External ears no masses with nose patent; normal hearing.  Neck: Supple; trachea midline.  Cardiovascular: Normal heart sounds, no LE edema. DP +2 bilaterally.  Lungs: Normal effort; lungs anterior bilaterally clear to  auscultation.  Abdomen: Soft, no masses, no hernias.  MS: No clubbing or cyanosis of nails noted;  unable to assess gait.  Skin: No rashes, lesions, or ulcers; no nodules. Injection sites are ok. No lipo hypertropthy or atrophy.  Psychiatric: Good judgement and insight; normal mood and affect.  Neurological: Cranial nerves are grossly intact.   Foot: nails in good condition, no amputations noted.

## 2019-05-24 NOTE — PLAN OF CARE
POC reviewed with patient and daughter; understanding verbalized. D5 1/2 NS @ 150/hr dc'd. NS @ 75 cc/hr infusing per ords. Titratable insulin drip infusing dc'd per ord. Pt remains up with walker to bathroom. Free from falls and injuries this shift. Tele in place, with normal sinus rhythm. Pt changed from NPO to diabetic diet. No c/o N/V. Accuchecks q 1 hour dc'd per order. Accuchecks q4h per ords. Blood Cultures re-ordered as prelim results for BC drawn yesterday were gram (+) cocci clusters, Dr. Schofield acknowledged. Pt voids clear yellow urine in hat via bathroom toilet. No BM's this shift. Pt. with nonskid footwear on, bed in lowest position, and locked with bed rails up x 2. Pt. has call light and personal items within reach. VSS and afebrile this shift. All questions and concerns addressed at this time. Will continue to closely monitor.

## 2019-05-24 NOTE — ASSESSMENT & PLAN NOTE
BG goal 140-180 while inpatient.       Patient takes ~ 285-325 units TDD of insulin at home (BG reasonably well controlled with increased doses over last month until prior to admission per BG logs at last visit). On U500 at home.     Recommend:   Levemir 30 units BID  Novolog 20 units with meals.   (considerations with insulin dosing: based on IV insulin infusion requirements 2.2 u/hr not enough insulin 2.8 u/hr too much basal; 2.5 u/hr = 60 units of basal insulin needed and prandial 20 units for basal/pradial matching; from home dosing ~285 units TDD for lower requirements. Given U500 decreased by 50% on admission and then split 50/50 basal prandial ~70 units basal and 23 units with meals; dosing also consistent with ~0.8 units/kg dosing).   Patient will likely need up titration of insulin given that she appears to be very insulin resistance.   BG monitoring ac/hs/0200 and moderate dose correction scale given insulin resistance.

## 2019-05-24 NOTE — H&P
"[facility] Hospital Medicine  History & Physical    Patient Name: Annika Mac  MRN: [id number]  Admission Date: 5/23/2019  Attending Physician: Juana Hernandez MD   Primary Care Provider: Mamie Cotton MD    [facility] Medicine Team: [facility] HOSP MED 2 Pat Teresa MD     Patient information was obtained from patient and ER records.     Subjective:     Principal Problem:Diabetic ketoacidosis without coma associated with type 2 diabetes mellitus    Chief Complaint:   Chief Complaint   Patient presents with    Hyperglycemia     Pt states that her glucometer read "high" at home.  Pt states that she has taken her medications as prescribed.        HPI: Annika Mac is a  a 68 y.o female admitted to Hospital medicine for mild DKA in the setting of her not contorlling her diet. She has a past medical history of DM  ( on insulin and victoza ) , HTN, HLD, , CKD, HFpEF, stroke who presented to the ED with a complaint of hyperglycemia. She reported that on Tuesday she started having headaches and polyuria. The only thing different in her daily activities was she was eating two popsicles because she felt dehydrated on the day she had the headaches. She then noticed that every time she checked her sugar the glucometer could not read     Patient is taking victoza and SSI that has been steady regimen for her but she was told by her PCP that she is likely going to need some basal insulin. She has not missed any doses . She endorse nausea and inability to take home meds. She also has been having worsening Bilateral leg edema.  She denies fever, vomiting, dysuria, abdominal pain, or diarrhea.     Of note: hospitalized once for hypoglycemia never for hyperglycemia or DKA    In the ED found to have sugars > 500, bhydroxy elevated to 0.6, mildly acidotic, gap was closed, sugars fluctuating after given 4 L of NS and regular insulin. She was started on insulin drip     Past Medical History:   Diagnosis Date    " Allergy     Anemia 2012    Arthritis     CHF (congestive heart failure)     CKD (chronic kidney disease) stage 3, GFR 30-59 ml/min 2012    Clotting disorder     Colon polyp     Deep vein thrombophlebitis of left leg 2012    Degenerative disc disease     Diabetic peripheral neuropathy associated with type 2 diabetes mellitus 2012    GERD (gastroesophageal reflux disease)     HTN (hypertension) 2012    Hyperlipidemia 2012    Lead-induced gout of ankle or foot     right going on three weeks    Nonproliferative diabetic retinopathy of right eye 2012    Obstructive sleep apnea 2012    Osteoporosis     Proliferative diabetic retinopathy of left eye 2012    Stroke 2003    Type 2 diabetes mellitus with diabetic polyneuropathy 2012    Ulcer        Past Surgical History:   Procedure Laterality Date    CATARACT EXTRACTION W/  INTRAOCULAR LENS IMPLANT Left 3/2002    left     CATARACT EXTRACTION W/  INTRAOCULAR LENS IMPLANT Right     OD dr. olivares     SECTION      COLONOSCOPY N/A 2018    Performed by [facility]an [facility] at [facility] ENDO (2ND FLR)    CYST REMOVAL  2011    left side of face    ESOPHAGOGASTRODUODENOSCOPY (EGD) N/A 2018    Performed by [facility]an [facility] at [facility] ENDO (2ND FLR)    EYE SURGERY Bilateral 2012    eye implants    GANGLION CYST EXCISION  2007    Right wrist    INSERTION, IOL PROSTHESIS Right 2012    Performed by Linda Olivares MD at [facility] OR 1ST FLR    Pan Retinal Photocoagulation Bilateral     Dr. Monterroso (Proliferative Diabetic Retinopathy)    PHACOEMULSIFICATION, CATARACT Right 2012    Performed by Linda [facility] at [facility] OR 1ST FLR    TOTAL ABDOMINAL HYSTERECTOMY  1982    TOTAL KNEE ARTHROPLASTY  2006    left    TRIGGER FINGER RELEASE  2009       Review of patient's allergies indicates:   Allergen Reactions    Iodinated contrast- oral and iv dye  "Rash       Current Facility-Administered Medications on File Prior to Encounter   Medication    triamcinolone acetonide injection 10 mg     Current Outpatient Medications on File Prior to Encounter   Medication Sig    allopurinol (ZYLOPRIM) 300 MG tablet Take 1 tablet (300 mg total) by mouth once daily.    amLODIPine (NORVASC) 10 MG tablet Take 1 tablet (10 mg total) by mouth once daily.    aspirin 81 MG Chew Take 1 tablet (81 mg total) by mouth once daily.    BD ULTRA-FINE KIKA PEN NEEDLE 32 gauge x 5/32" Ndle To use 4 times daily    blood-glucose meter kit Use as instructed, true test/result meter    candesartan (ATACAND) 16 MG tablet Take 1 tablet (16 mg total) by mouth once daily.    DULoxetine (CYMBALTA) 30 MG capsule Take 1 capsule (30 mg total) by mouth once daily.    famotidine (PEPCID) 20 MG tablet Take 1 tablet (20 mg total) by mouth 2 (two) times daily.    fexofenadine (ALLEGRA) 180 MG tablet TAKE 1 TABLET BY MOUTH ONCE DAILY    finasteride (PROSCAR) 5 mg tablet TAKE 1 TABLET(5 MG) BY MOUTH EVERY DAY    furosemide (LASIX) 20 MG tablet Take 1 tablet (20 mg total) by mouth once daily. Take an extra pill if short of breath or leg swelling    insulin regular hum U-500 conc (HUMULIN R U-500, CONC, KWIKPEN) 500 unit/mL (3 mL) InPn Inject  Units into the skin 3 (three) times daily before meals. Take 115u with breakfast,85u with lunch, and 50u with dinner. (Patient taking differently: Inject  Units into the skin 3 (three) times daily before meals. Take 115u with breakfast,140 with large and 120u with small.  100 unit with small 90 with large. Supper 65 large 50 small.)    labetalol (NORMODYNE) 300 MG tablet Take 1 tablet (300 mg total) by mouth 2 (two) times daily.    lancets 31 gauge Misc 1 lancet by Misc.(Non-Drug; Combo Route) route 4 (four) times daily.    liraglutide 0.6 mg/0.1 mL, 18 mg/3 mL, subq PNIJ 0.6 mg/0.1 mL (18 mg/3 mL) PnIj Inject 0.6 mg daily into the skin for 2 weeks " then increase to 1.2 mg thereafter daily.    pregabalin (LYRICA) 100 MG capsule TAKE 1 CAPSULE BY MOUTH TWO TIMES DAILY    simvastatin (ZOCOR) 40 MG tablet Take 0.5 tablets (20 mg total) by mouth every evening.    colchicine (COLCRYS) 0.6 mg tablet Take 0.6 mg by mouth as needed.    fluocinonide (LIDEX) 0.05 % external solution AAA scalp qday - bid prn pruritus    HYDROcodone-acetaminophen (NORCO)  mg per tablet Take 1 tablet by mouth every 6 (six) hours as needed for Pain.    ketoconazole (NIZORAL) 2 % shampoo Wash hair with medicated shampoo at least 2x/week - let sit on scalp at least 5 minutes prior to rinsing    triamcinolone acetonide 0.1% (KENALOG) 0.1 % ointment AAA bid hands    TRUETEST TEST STRIPS Strp 1 strip by Misc.(Non-Drug; Combo Route) route 4 (four) times daily.     Family History     Problem Relation (Age of Onset)    Diabetes Mother, Sister, Brother    Heart disease Father    Hypertension Mother, Brother    No Known Problems Daughter, Son, Daughter    Thyroid disease Brother        Tobacco Use    Smoking status: Never Smoker    Smokeless tobacco: Never Used   Substance and Sexual Activity    Alcohol use: No    Drug use: No    Sexual activity: Yes     Partners: Male     Review of Systems   Constitutional: Positive for appetite change and chills. Negative for activity change, fatigue and fever.   HENT: Negative for congestion and sore throat.    Eyes: Negative for photophobia and visual disturbance.   Respiratory: Negative for shortness of breath, wheezing and stridor.    Cardiovascular: Positive for leg swelling. Negative for chest pain.   Gastrointestinal: Positive for nausea. Negative for abdominal distention, diarrhea and vomiting.   Endocrine: Positive for polydipsia, polyphagia and polyuria.   Genitourinary: Positive for frequency and urgency. Negative for difficulty urinating and dysuria.   Musculoskeletal: Negative for arthralgias, back pain and gait problem.   Skin:  Negative for color change, pallor, rash and wound.   Neurological: Positive for headaches. Negative for syncope and light-headedness.   Psychiatric/Behavioral: Negative for agitation.     Objective:     Vital Signs (Most Recent):  Temp: 98.3 °F (36.8 °C) (05/23/19 2051)  Pulse: 93 (05/23/19 2051)  Resp: 15 (05/23/19 1633)  BP: (!) 152/71 (05/23/19 2051)  SpO2: 99 % (05/23/19 2051) Vital Signs (24h Range):  Temp:  [98.1 °F (36.7 °C)-98.8 °F (37.1 °C)] 98.3 °F (36.8 °C)  Pulse:  [68-94] 93  Resp:  [15-20] 15  SpO2:  [98 %-99 %] 99 %  BP: (135-152)/(60-71) 152/71     Weight: 135.2 kg (298 lb)  Body mass index is 49.59 kg/m².    Physical Exam   Constitutional: She is oriented to person, place, and time. She appears well-developed and well-nourished.   HENT:   Head: Normocephalic and atraumatic.   Eyes: Pupils are equal, round, and reactive to light. EOM are normal.   Neck: Normal range of motion. Neck supple.   Cardiovascular: Normal rate and regular rhythm.   Pulmonary/Chest: Effort normal and breath sounds normal.   Abdominal: Soft. Bowel sounds are normal.   Musculoskeletal: She exhibits edema.   Neurological: She is alert and oriented to person, place, and time.   Skin: Skin is warm and dry.   Psychiatric: She has a normal mood and affect. Her behavior is normal. Judgment and thought content normal.         CRANIAL NERVES     CN III, IV, VI   Pupils are equal, round, and reactive to light.  Extraocular motions are normal.        Significant Labs:   CBC:   Recent Labs   Lab 05/23/19  1325   WBC 7.44   HGB 11.3*   HCT 34.5*        CMP:   Recent Labs   Lab 05/23/19  1325 05/23/19  1722   * 137   K 5.2* 4.2   CL 97 104   CO2 23 22*   * 355*   BUN 28* 24*   CREATININE 2.1* 1.7*   CALCIUM 10.7* 9.9   PROT 7.8  --    ALBUMIN 4.3  --    BILITOT 0.5  --    ALKPHOS 157*  --    AST 29  --    ALT 22  --    ANIONGAP 13 11   EGFRNONAA 23.5* 30.3*     Lactic Acid:   Recent Labs   Lab 05/23/19  1335   LACTATE  2.2     Magnesium: No results for input(s): MG in the last 48 hours.  POCT Glucose:   Recent Labs   Lab 05/23/19  1610 05/23/19  1913   POCTGLUCOSE 375* 403*     Urine Studies:   Recent Labs   Lab 05/23/19  1315   COLORU Straw   APPEARANCEUA Clear   PHUR 6.0   SPECGRAV 1.020   PROTEINUA Negative   GLUCUA 3+*   KETONESU 1+*   BILIRUBINUA Negative   OCCULTUA Negative   NITRITE Negative   LEUKOCYTESUR Negative   RBCUA 1   WBCUA 0   BACTERIA None   SQUAMEPITHEL 1     All pertinent labs within the past 24 hours have been reviewed.    Significant Imaging: I have reviewed all pertinent imaging results/findings within the past 24 hours.  I have reviewed and interpreted all pertinent imaging results/findings within the past 24 hours.    Assessment/Plan:     * Diabetic ketoacidosis without coma associated with type 2 diabetes mellitus  -Blood sugar on arrival 574 with an anion gap of 13 and pH 7.2 and betahydroxy of 0.6.   -Likely induced by Diet noncompliance, looks like she has been getting hyperglycemic slowly   -Home DM regimen is victoza, and SSI and humulin   - despite closed gap at this time in biochemically confirmed DKA, with continue with insulin drip and fluids to avoid reopening gap especially since her poct glucose is fluctuating so much   - additionally it will help determining the 24/hr requirement     Plan   - Start insulin gtt 6 u/hr with protocol followed  with q1h accuchecks while on the drip.   -  Once blood sugar is <250, bicarb >18 and anion gap is closed, transition to subq insulin with a 1-2 h overlap with drip.   -  After gap closes : Will need Detemir (0.25mg/kg) daily and Aspart (0.08mg/kg) units per meal with SSI and then change accuchecks 4 times daily before meals and at bedtime (AC and HS)  - Start 0.45 normal saline + K  at 150cc/hr.  Once blood sugar is 200, will  change IVF to D5 1/2 NS and decrease rate   - Will Monitor electrolytes with BMP and Mag q4h while on insulin gtt and place on  tele  - NPO while on insulin gtt then Diabetic diet when on subq insulin.  - Hold home meds for now as she is nauseated           GEORGE (acute kidney injury)    ARF Superimposed on CKD     · Creatinine 2.1 baseline is 1.7  · Secondary to DKA    PLAN :    · Monitor Ins and Outs and daily weights.   · Avoid nephrotoxic agents such as NSAIDS, Gadolinium and IV Radiocontrast  · Renal Dose meds to current GFR/Creat Clereance.  · Will give her fluids slowly as her GEORGE is resolving and she has bilateral edema          Stage 4 chronic kidney disease  - as under george       GERD (gastroesophageal reflux disease)  - continue PPI       Essential hypertension  - hold oral meds and reintroduce after patient can eat       Gout, chronic  - continue home meds when able       History of stroke  - continue asa ( she took it today )         Type 2 diabetes mellitus with diabetic polyneuropathy, with long-term current use of insulin  - as under DKA        VTE Risk Mitigation (From admission, onward)        Ordered     enoxaparin injection 40 mg  Daily      05/23/19 1917     IP VTE HIGH RISK PATIENT  Once      05/23/19 1917             Pat Teresa MD  Department of Hospital Medicine   [facility]

## 2019-05-24 NOTE — ASSESSMENT & PLAN NOTE
BG goal 140-180 while inpatient.     Previous admission in 2007 for DKA and admission in 2011 for hypoglycemia.     Patient takes ~ 285-325 units TDD of insulin at home (BG reasonably well controlled with increased doses over last month until prior to admission per BG logs at last visit). On U500 at home.     Will defer orders to primary team:   Recommend to -   Increase Levemir 35  units BID  Continue Novolog 20 units with meals.   BG monitoring ac/hs/0200 and moderate dose correction scale given insulin resistance.     (initial considerations with insulin dosing: based on IV insulin infusion requirements 2.2 u/hr not enough insulin 2.8 u/hr too much basal; 2.5 u/hr = 60 units of basal insulin needed and prandial 20 units for basal/pradial matching; from home dosing ~285 units TDD for lower requirements. Given U500 decreased by 50% on admission and then split 50/50 basal prandial ~70 units basal and 23 units with meals; dosing also consistent with ~0.8 units/kg dosing).     Patient will likely need up titration of insulin given that she appears to be very insulin resistance.     Discharge plans- No clear cause of DKA. Reasonably well controlled 2 weeks ago at endocrine visit.  U500 insulin 150 units if large breakfast (130 units if small breakfast), 100 units if large lunch (90 units if small lunch), increase 80 units if large dinner (70 units if small dinner). Discard current insulin pen. Open new pen. BG logs to Ursula Salinas NP on Monday for titration of insulin outpatient. Increase to victoza 1.8 mg daily. Will review injection technique with patient prior to discharge to confirm appropriate administration.

## 2019-05-25 ENCOUNTER — TELEPHONE (OUTPATIENT)
Dept: ENDOCRINOLOGY | Facility: HOSPITAL | Age: 69
End: 2019-05-25

## 2019-05-25 LAB
ANION GAP SERPL CALC-SCNC: 7 MMOL/L (ref 8–16)
BASOPHILS # BLD AUTO: 0.03 K/UL (ref 0–0.2)
BASOPHILS NFR BLD: 0.5 % (ref 0–1.9)
BUN SERPL-MCNC: 15 MG/DL (ref 8–23)
CALCIUM SERPL-MCNC: 9.9 MG/DL (ref 8.7–10.5)
CHLORIDE SERPL-SCNC: 104 MMOL/L (ref 95–110)
CO2 SERPL-SCNC: 25 MMOL/L (ref 23–29)
CREAT SERPL-MCNC: 1.6 MG/DL (ref 0.5–1.4)
DIFFERENTIAL METHOD: ABNORMAL
EOSINOPHIL # BLD AUTO: 0.4 K/UL (ref 0–0.5)
EOSINOPHIL NFR BLD: 5.6 % (ref 0–8)
ERYTHROCYTE [DISTWIDTH] IN BLOOD BY AUTOMATED COUNT: 13.9 % (ref 11.5–14.5)
EST. GFR  (AFRICAN AMERICAN): 37.6 ML/MIN/1.73 M^2
EST. GFR  (NON AFRICAN AMERICAN): 32.6 ML/MIN/1.73 M^2
GLUCOSE SERPL-MCNC: 232 MG/DL (ref 70–110)
HCT VFR BLD AUTO: 30.3 % (ref 37–48.5)
HGB BLD-MCNC: 9.8 G/DL (ref 12–16)
IMM GRANULOCYTES # BLD AUTO: 0.02 K/UL (ref 0–0.04)
IMM GRANULOCYTES NFR BLD AUTO: 0.3 % (ref 0–0.5)
LYMPHOCYTES # BLD AUTO: 2 K/UL (ref 1–4.8)
LYMPHOCYTES NFR BLD: 32.1 % (ref 18–48)
MAGNESIUM SERPL-MCNC: 2 MG/DL (ref 1.6–2.6)
MCH RBC QN AUTO: 28.7 PG (ref 27–31)
MCHC RBC AUTO-ENTMCNC: 32.3 G/DL (ref 32–36)
MCV RBC AUTO: 89 FL (ref 82–98)
MONOCYTES # BLD AUTO: 0.7 K/UL (ref 0.3–1)
MONOCYTES NFR BLD: 11.8 % (ref 4–15)
NEUTROPHILS # BLD AUTO: 3.1 K/UL (ref 1.8–7.7)
NEUTROPHILS NFR BLD: 49.7 % (ref 38–73)
NRBC BLD-RTO: 0 /100 WBC
PHOSPHATE SERPL-MCNC: 3.2 MG/DL (ref 2.7–4.5)
PLATELET # BLD AUTO: 215 K/UL (ref 150–350)
PMV BLD AUTO: 11.5 FL (ref 9.2–12.9)
POCT GLUCOSE: 128 MG/DL (ref 70–110)
POCT GLUCOSE: 141 MG/DL (ref 70–110)
POCT GLUCOSE: 169 MG/DL (ref 70–110)
POCT GLUCOSE: 210 MG/DL (ref 70–110)
POCT GLUCOSE: 327 MG/DL (ref 70–110)
POTASSIUM SERPL-SCNC: 4.5 MMOL/L (ref 3.5–5.1)
RBC # BLD AUTO: 3.42 M/UL (ref 4–5.4)
SODIUM SERPL-SCNC: 136 MMOL/L (ref 136–145)
WBC # BLD AUTO: 6.27 K/UL (ref 3.9–12.7)

## 2019-05-25 PROCEDURE — 36415 COLL VENOUS BLD VENIPUNCTURE: CPT

## 2019-05-25 PROCEDURE — 97165 OT EVAL LOW COMPLEX 30 MIN: CPT

## 2019-05-25 PROCEDURE — 83735 ASSAY OF MAGNESIUM: CPT

## 2019-05-25 PROCEDURE — 80048 BASIC METABOLIC PNL TOTAL CA: CPT

## 2019-05-25 PROCEDURE — 99239 HOSP IP/OBS DSCHRG MGMT >30: CPT | Mod: GC,,, | Performed by: HOSPITALIST

## 2019-05-25 PROCEDURE — 63600175 PHARM REV CODE 636 W HCPCS: Performed by: NURSE PRACTITIONER

## 2019-05-25 PROCEDURE — 99232 SBSQ HOSP IP/OBS MODERATE 35: CPT | Mod: ,,, | Performed by: NURSE PRACTITIONER

## 2019-05-25 PROCEDURE — 20600001 HC STEP DOWN PRIVATE ROOM

## 2019-05-25 PROCEDURE — 99232 PR SUBSEQUENT HOSPITAL CARE,LEVL II: ICD-10-PCS | Mod: ,,, | Performed by: NURSE PRACTITIONER

## 2019-05-25 PROCEDURE — 99239 PR HOSPITAL DISCHARGE DAY,>30 MIN: ICD-10-PCS | Mod: GC,,, | Performed by: HOSPITALIST

## 2019-05-25 PROCEDURE — 25000003 PHARM REV CODE 250: Performed by: STUDENT IN AN ORGANIZED HEALTH CARE EDUCATION/TRAINING PROGRAM

## 2019-05-25 PROCEDURE — 97161 PT EVAL LOW COMPLEX 20 MIN: CPT

## 2019-05-25 PROCEDURE — 85025 COMPLETE CBC W/AUTO DIFF WBC: CPT

## 2019-05-25 PROCEDURE — 84100 ASSAY OF PHOSPHORUS: CPT

## 2019-05-25 PROCEDURE — 63600175 PHARM REV CODE 636 W HCPCS: Performed by: STUDENT IN AN ORGANIZED HEALTH CARE EDUCATION/TRAINING PROGRAM

## 2019-05-25 RX ORDER — PEN NEEDLE, DIABETIC 31 GX5/16"
NEEDLE, DISPOSABLE MISCELLANEOUS
Qty: 200 EACH | Refills: 20 | Status: SHIPPED | OUTPATIENT
Start: 2019-05-25 | End: 2019-07-18 | Stop reason: SDUPTHER

## 2019-05-25 RX ORDER — PEN NEEDLE, DIABETIC 30 GX3/16"
NEEDLE, DISPOSABLE MISCELLANEOUS
Qty: 200 EACH | Refills: 2 | Status: SHIPPED | OUTPATIENT
Start: 2019-05-25 | End: 2019-05-26 | Stop reason: HOSPADM

## 2019-05-25 RX ORDER — OXYCODONE HYDROCHLORIDE 5 MG/1
5 TABLET ORAL ONCE
Status: COMPLETED | OUTPATIENT
Start: 2019-05-25 | End: 2019-05-25

## 2019-05-25 RX ORDER — INSULIN ASPART 100 [IU]/ML
20 INJECTION, SOLUTION INTRAVENOUS; SUBCUTANEOUS
Qty: 20 ML | Refills: 11 | Status: SHIPPED | OUTPATIENT
Start: 2019-05-25 | End: 2019-05-25 | Stop reason: HOSPADM

## 2019-05-25 RX ORDER — CANDESARTAN 16 MG/1
16 TABLET ORAL DAILY
Qty: 90 TABLET | Refills: 3 | Status: SHIPPED | OUTPATIENT
Start: 2019-05-25 | End: 2019-05-26 | Stop reason: HOSPADM

## 2019-05-25 RX ADMIN — PREGABALIN 100 MG: 75 CAPSULE ORAL at 08:05

## 2019-05-25 RX ADMIN — FAMOTIDINE 20 MG: 20 TABLET, FILM COATED ORAL at 08:05

## 2019-05-25 RX ADMIN — AMLODIPINE BESYLATE 10 MG: 10 TABLET ORAL at 08:05

## 2019-05-25 RX ADMIN — LABETALOL HYDROCHLORIDE 300 MG: 100 TABLET, FILM COATED ORAL at 08:05

## 2019-05-25 RX ADMIN — ASPIRIN 81 MG CHEWABLE TABLET 81 MG: 81 TABLET CHEWABLE at 08:05

## 2019-05-25 RX ADMIN — INSULIN ASPART 20 UNITS: 100 INJECTION, SOLUTION INTRAVENOUS; SUBCUTANEOUS at 05:05

## 2019-05-25 RX ADMIN — ALLOPURINOL 300 MG: 300 TABLET ORAL at 08:05

## 2019-05-25 RX ADMIN — ENOXAPARIN SODIUM 40 MG: 100 INJECTION SUBCUTANEOUS at 05:05

## 2019-05-25 RX ADMIN — DULOXETINE 30 MG: 30 CAPSULE, DELAYED RELEASE ORAL at 08:05

## 2019-05-25 RX ADMIN — INSULIN ASPART 20 UNITS: 100 INJECTION, SOLUTION INTRAVENOUS; SUBCUTANEOUS at 12:05

## 2019-05-25 RX ADMIN — INSULIN DETEMIR 30 UNITS: 100 INJECTION, SOLUTION SUBCUTANEOUS at 08:05

## 2019-05-25 RX ADMIN — INSULIN ASPART 2 UNITS: 100 INJECTION, SOLUTION INTRAVENOUS; SUBCUTANEOUS at 12:05

## 2019-05-25 RX ADMIN — SIMVASTATIN 20 MG: 20 TABLET, FILM COATED ORAL at 08:05

## 2019-05-25 RX ADMIN — INSULIN ASPART 20 UNITS: 100 INJECTION, SOLUTION INTRAVENOUS; SUBCUTANEOUS at 08:05

## 2019-05-25 RX ADMIN — INSULIN ASPART 8 UNITS: 100 INJECTION, SOLUTION INTRAVENOUS; SUBCUTANEOUS at 08:05

## 2019-05-25 RX ADMIN — OXYCODONE HYDROCHLORIDE 5 MG: 5 TABLET ORAL at 08:05

## 2019-05-25 NOTE — PLAN OF CARE
[facility]    HOME HEALTH ORDERS  FACE TO FACE ENCOUNTER    Patient Name: Annika Mac  YOB: 1950    PCP: Mamie Cotton MD   [facility] [address] [facility] [place] [address]  PCP Phone Number: [phone]  PCP Fax: [phone]    Encounter Date: 05/26/2019    Admit to Home Health    Diagnoses:  Active Hospital Problems    Diagnosis  POA    *Diabetic ketoacidosis without coma associated with type 2 diabetes mellitus [E11.10]  Unknown    Class 3 severe obesity in adult [E66.01]  Unknown    GEORGE (acute kidney injury) [N17.9]  Unknown    Stage 4 chronic kidney disease [N18.4]  Yes    GERD (gastroesophageal reflux disease) [K21.9]  Yes    Essential hypertension [I10]  Yes    Gout, chronic [M1A.9XX0]  Yes    Type 2 diabetes mellitus with diabetic polyneuropathy, with long-term current use of insulin [E11.42, Z79.4]  Not Applicable    History of stroke [Z86.73]  Not Applicable      Resolved Hospital Problems   No resolved problems to display.       Future Appointments   Date Time Provider Department Center   6/4/2019 10:00 AM Enid Hines DPM NOM POD [facility]   7/3/2019 10:00 AM Micaela Barrientos MD [facility] PHYSMED [facility]   8[facility]1/2019 11:[facility] [place]   8/8/2019 10:30 AM Ursula Salinas NP NOM ENDODIA [facility]     Follow-up Information     Mamie Cotton MD In 2 days.    Specialty:  Internal Medicine  Why:  BP medication resume, candesartan held due to low blood pressure  Contact information:  [address]  [facility] [address]  [phone]                     I have seen and examined this patient face to face today. My clinical findings that support the need for the home health skilled services and home bound status are the following:  Weakness/numbness causing balance and gait disturbance due to Stroke making it taxing to leave home.    Allergies:  Review of patient's allergies indicates:   Allergen  "Reactions    Iodinated contrast- oral and iv dye Rash       Diet: diabetic diet: 2000 calorie    Activities: activity as tolerated    Nursing:   SN to complete comprehensive assessment including routine vital signs. Instruct on disease process and s/s of complications to report to MD. Review/verify medication list sent home with the patient at time of discharge  and instruct patient/caregiver as needed. Frequency may be adjusted depending on start of care date.    Notify MD if SBP > 160 or < 90; DBP > 90 or < 50; HR > 120 or < 50; Temp > 101;       CONSULTS:    Physical Therapy to evaluate and treat. Evaluate for home safety and equipment needs; Establish/upgrade home exercise program. Perform / instruct on therapeutic exercises, gait training, transfer training, and Range of Motion.  Occupational Therapy to evaluate and treat. Evaluate home environment for safety and equipment needs. Perform/Instruct on transfers, ADL training, ROM, and therapeutic exercises.    MISCELLANEOUS CARE:  Diabetic Care:   SN to perform and educate Diabetic management with blood glucose monitoring:, Fingerstick blood sugar AC and HS and Report CBG < 60 or > 350 to physician.    Rollator  Glucometer    Medications: Review discharge medications with patient and family and provide education.      Current Discharge Medication List      START taking these medications    Details   insulin NPH-insulin regular, 70/30, (NOVOLIN 70/30 U-100 INSULIN) 100 unit/mL (70-30) injection 100 units with breakfast, 90 units with lunch, and 80 units with dinner;   Qty: 10 mL, Refills: 2         CONTINUE these medications which have CHANGED    Details   BD ULTRA-FINE KIKA PEN NEEDLE 32 gauge x 5/32" Ndle To use 4 times daily  Qty: 200 each, Refills: 20    Associated Diagnoses: Type 2 diabetes mellitus with diabetic polyneuropathy         CONTINUE these medications which have NOT CHANGED    Details   amLODIPine (NORVASC) 10 MG tablet Take 1 tablet (10 mg " total) by mouth once daily.  Qty: 90 tablet, Refills: 0      aspirin 81 MG Chew Take 1 tablet (81 mg total) by mouth once daily.  Refills: 0      blood-glucose meter kit Use as instructed, true test/result meter  Qty: 1 each, Refills: 0      DULoxetine (CYMBALTA) 30 MG capsule Take 1 capsule (30 mg total) by mouth once daily.  Qty: 90 capsule, Refills: 3    Associated Diagnoses: Lumbar facet arthropathy; PCO (posterior capsular opacification), bilateral; Spinal stenosis of lumbar region, unspecified whether neurogenic claudication present; Diabetic autonomic neuropathy associated with type 2 diabetes mellitus; Chronic low back pain with sciatica, sciatica laterality unspecified, unspecified back pain laterality; Acute pain of right knee; Primary osteoarthritis of right knee; Polyneuropathy associated with underlying disease      famotidine (PEPCID) 20 MG tablet Take 1 tablet (20 mg total) by mouth 2 (two) times daily.  Qty: 1 tablet, Refills: 0      fexofenadine (ALLEGRA) 180 MG tablet TAKE 1 TABLET BY MOUTH ONCE DAILY  Qty: 30 tablet, Refills: 0    Associated Diagnoses: Allergy, subsequent encounter      finasteride (PROSCAR) 5 mg tablet TAKE 1 TABLET(5 MG) BY MOUTH EVERY DAY  Qty: 90 tablet, Refills: 1    Comments: **Patient requests 90 days supply**  Associated Diagnoses: Female pattern hair loss      furosemide (LASIX) 20 MG tablet Take 1 tablet (20 mg total) by mouth once daily. Take an extra pill if short of breath or leg swelling  Qty: 120 tablet, Refills: 3      ketoconazole (NIZORAL) 2 % shampoo Wash hair with medicated shampoo at least 2x/week - let sit on scalp at least 5 minutes prior to rinsing  Qty: 120 mL, Refills: 5    Associated Diagnoses: Hair loss disorder      labetalol (NORMODYNE) 300 MG tablet Take 1 tablet (300 mg total) by mouth 2 (two) times daily.  Qty: 180 tablet, Refills: 3      lancets 31 gauge Misc 1 lancet by Misc.(Non-Drug; Combo Route) route 4 (four) times daily.  Qty: 150 each,  Refills: 6    Associated Diagnoses: Type 2 diabetes mellitus with diabetic polyneuropathy      multivitamin (THERAGRAN) per tablet Take 1 tablet by mouth once daily.      pregabalin (LYRICA) 100 MG capsule TAKE 1 CAPSULE BY MOUTH TWO TIMES DAILY  Qty: 180 capsule, Refills: 1    Associated Diagnoses: Chronic low back pain with sciatica, sciatica laterality unspecified, unspecified back pain laterality; Lumbar facet arthropathy; Spinal stenosis of lumbar region, unspecified whether neurogenic claudication present; PCO (posterior capsular opacification), bilateral; Diabetic autonomic neuropathy associated with type 2 diabetes mellitus; Acute pain of right knee; Primary osteoarthritis of right knee; Spinal stenosis of lumbar region with neurogenic claudication; Polyneuropathy associated with underlying disease; Osteoarthritis of spine with radiculopathy, cervical region; History of stroke; Gait instability      simvastatin (ZOCOR) 40 MG tablet Take 0.5 tablets (20 mg total) by mouth every evening.  Qty: 90 tablet, Refills: 1    Comments: 90 day supply  Associated Diagnoses: Mixed hyperlipidemia; CKD (chronic kidney disease) stage 3, GFR 30-59 ml/min      triamcinolone acetonide 0.1% (KENALOG) 0.1 % ointment AAA bid hands  Qty: 60 g, Refills: 1    Associated Diagnoses: Hand eczema      TRUETEST TEST STRIPS Strp 1 strip by Misc.(Non-Drug; Combo Route) route 4 (four) times daily.  Qty: 400 strip, Refills: 4    Comments: 90 day supply  Associated Diagnoses: Type 2 diabetes mellitus with diabetic polyneuropathy      allopurinol (ZYLOPRIM) 300 MG tablet Take 1 tablet (300 mg total) by mouth once daily.  Qty: 90 tablet, Refills: 3      colchicine (COLCRYS) 0.6 mg tablet Take 0.6 mg by mouth as needed.      fluocinonide (LIDEX) 0.05 % external solution AAA scalp qday - bid prn pruritus  Qty: 60 mL, Refills: 3    Associated Diagnoses: Hair loss disorder         STOP taking these medications       HYDROcodone-acetaminophen  (NORCO)  mg per tablet Comments:   Reason for Stopping:         insulin regular hum U-500 conc (HUMULIN R U-500, CONC, KWIKPEN) 500 unit/mL (3 mL) InPn Comments:   Reason for Stopping:         liraglutide 0.6 mg/0.1 mL, 18 mg/3 mL, subq PNIJ 0.6 mg/0.1 mL (18 mg/3 mL) PnIj Comments:   Reason for Stopping:               I certify that this patient is confined to her home and needs intermittent skilled nursing care, physical therapy and occupational therapy.

## 2019-05-25 NOTE — PT/OT/SLP EVAL
Occupational Therapy   Evaluation and Discharge Note    Name: Annika Mac  MRN: [id number]  Admitting Diagnosis:  Diabetic ketoacidosis without coma associated with type 2 diabetes mellitus      Recommendations:     Discharge Recommendations: home  Discharge Equipment Recommendations:  none  Barriers to discharge:  None    Assessment:     Annika Mac is a 68 y.o. female with a medical diagnosis of Diabetic ketoacidosis without coma associated with type 2 diabetes mellitus. At this time, patient is functioning at their prior level of function and does not require further acute OT services.     Plan:     During this hospitalization, patient does not require further acute OT services.  Please re-consult if situation changes.    · Plan of Care Reviewed with: patient    Subjective     Chief Complaint: Wishing to return home  Patient/Family Comments/goals: Return home    Occupational Profile:  Living Environment: Patient lives with her  in a split-level home, all needs met on first floor, 0STE.   Previous level of function: Independent  Equipment Used at home:  walker, rolling, shower chair, cane, straight, wheelchair  Assistance upon Discharge: Pt's  available for assist as well as grand children during summer break    Pain/Comfort:  · Location - Orientation 1: generalized  · Pain Addressed 1: Reposition, Distraction, Nurse notified    Patients cultural, spiritual, Mormon conflicts given the current situation: no    Objective:     Communicated with: Nursing prior to session.  Patient found sitting edge of bed with telemetry, peripheral IV, pulse ox (continuous) upon OT entry to room.    General Precautions: Standard, fall, diabetic   Orthopedic Precautions:N/A   Braces: N/A     Occupational Performance:    Bed Mobility:    · Independent    Functional Mobility/Transfers:  · Modified independent  · Functional Mobility: Modified independent, use of RW -- pt self-pacing    Activities of Daily  Living:  · Modified independent    Cognitive/Visual Perceptual:  Cognitive/Psychosocial Skills:     -       Oriented to: Person, Place, Time and Situation   -       Follows Commands/attention:Follows multistep  commands  -       Safety awareness/insight to disability: intact   -       Mood/Affect/Coping skills/emotional control: Cooperative and Pleasant  Visual/Perceptual:      -Intact      Physical Exam:  Postural examination/scapula alignment:    -       No postural abnormalities identified  Upper Extremity Range of Motion:     -       Right Upper Extremity: WFL  -       Left Upper Extremity: WFL  Upper Extremity Strength:    -       Right Upper Extremity: WFL  -       Left Upper Extremity: WFL   Strength:    -       Right Upper Extremity: WFL  -       Left Upper Extremity: WFL    AMPAC 6 Click ADL:  AMPAC Total Score: 24    Treatment & Education:  Evaluation completed, patient modified independent for all ADL's and functional mobility.  Pt expressing understanding of importance of functional mobility and continued participation with EOB and OOB ax as well as safe transfers and energy conservation.  Education:    Patient left sitting edge of bed with all lines intact and call button in reach    GOALS:   Multidisciplinary Problems     Occupational Therapy Goals     Not on file          Multidisciplinary Problems (Resolved)        Problem: Occupational Therapy Goal    Goal Priority Disciplines Outcome Interventions   Occupational Therapy Goal   (Resolved)     OT, PT/OT Outcome(s) achieved    Description:  Evaluation completed, patient is at baseline for ADL's and functional mobility.                    History:     Past Medical History:   Diagnosis Date    Allergy     Anemia 7/27/2012    Arthritis     CHF (congestive heart failure)     CKD (chronic kidney disease) stage 3, GFR 30-59 ml/min 7/27/2012    Clotting disorder     Colon polyp     Deep vein thrombophlebitis of left leg 7/27/2012    Degenerative  disc disease     Diabetic peripheral neuropathy associated with type 2 diabetes mellitus 2012    GERD (gastroesophageal reflux disease)     HTN (hypertension) 2012    Hyperlipidemia 2012    Lead-induced gout of ankle or foot     right going on three weeks    Nonproliferative diabetic retinopathy of right eye 2012    Obstructive sleep apnea 2012    Osteoporosis     Proliferative diabetic retinopathy of left eye 2012    Stroke 2003    Type 2 diabetes mellitus with diabetic polyneuropathy 2012    Ulcer        Past Surgical History:   Procedure Laterality Date    CATARACT EXTRACTION W/  INTRAOCULAR LENS IMPLANT Left 3/2002    left     CATARACT EXTRACTION W/  INTRAOCULAR LENS IMPLANT Right     OD dr. olivares     SECTION      COLONOSCOPY N/A 2018    Performed by [facility]izan Mac [facility] at [facility] (2ND FLR)    CYST REMOVAL  2011    left side of face    ESOPHAGOGASTRODUODENOSCOPY (EGD) N/A 2018    Performed by Faizan Mac MD at [facility] (2ND FLR)    EYE SURGERY Bilateral 2012    eye implants    GANGLION CYST EXCISION  2007    Right wrist    INSERTION, IOL PROSTHESIS Right 2012    Performed by Linda Olivares MD at [facility] OR 1ST FLR    Pan Retinal Photocoagulation Bilateral     Dr. Monterroso (Proliferative Diabetic Retinopathy)    PHACOEMULSIFICATION, CATARACT Right 2012    Performed by Linda Olivares MD at [facility] OR 1ST FLR    TOTAL ABDOMINAL HYSTERECTOMY  1982    TOTAL KNEE ARTHROPLASTY  2006    left    TRIGGER FINGER RELEASE  2009       Time Tracking:     OT Date of Treatment: 19  OT Start Time: 0840  OT Stop Time: 0852  OT Total Time (min): 12 min    Billable Minutes:Evaluation 12 mins    Flori Ray, OT  2019

## 2019-05-25 NOTE — PLAN OF CARE
Problem: Occupational Therapy Goal  Goal: Occupational Therapy Goal  Evaluation completed, patient is at baseline for ADL's and functional mobility.  Outcome: Outcome(s) achieved Date Met: 05/25/19  Flori Ray OT  5/25/19

## 2019-05-25 NOTE — PT/OT/SLP EVAL
Physical Therapy Evaluation and Discharge Note    Patient Name:  Annika Mac   MRN:  [id number]    Recommendations:     Discharge Recommendations:  home   Discharge Equipment Recommendations: Rollator   Barriers to discharge: None    Assessment:     Annika Mac is a 68 y.o. female admitted with a medical diagnosis of Diabetic ketoacidosis without coma associated with type 2 diabetes mellitus. .  At this time, patient is functioning at their prior level of function and does not require further acute PT services.     The patient is safe to ambulate with RN assist using RW, she would benefit from RN mobility protocol.     Recent Surgery: * No surgery found *      Plan:     During this hospitalization, patient does not require further acute PT services.  Please re-consult if situation changes.      Subjective     Chief Complaint: Headache  Patient/Family Comments/goals: return home  Pain/Comfort:  · Pain Rating 1: 9/10  · Location - Orientation 1: generalized  · Location 1: head  · Pain Addressed 1: Reposition, Distraction, Nurse notified(MD present and aware or pain)  · Pain Rating Post-Intervention 1: 9/10    Patients cultural, spiritual, Yarsanism conflicts given the current situation: no    Living Environment:  The patient lives with her  in [place] in a split-level home. The patient does not go up and down her stairs, no steps to enter. She was driving, not working PTA.   Prior to admission, patients level of function was independent, she uses her RW regularly at home and in the community, w/c for distances.  Equipment used at home: walker, rolling, rollator, shower chair, cane, straight, wheelchair.  DME owned (not currently used): none.  Upon discharge, patient will have assistance from family.    Objective:     Communicated with RN prior to session.  Patient found up in chair with telemetry, peripheral IV, pulse ox (continuous) upon PT entry to room.    General Precautions: Standard, fall, diabetic    Orthopedic Precautions:N/A   Braces: N/A     Exams:    Cognitive Exam  Patient is A&O x4 and follows 100% of one -step commands    Fine Motor Coordination   -       WFL     Postural Exam Patient presented with the following abnormalities:    -       Rounded shoulders  -       Forward head   Sensation    -       Light touch intact bilateral lower extremities   Skin Integrity/Edema     -       Skin integrity: visibly intact  -       Edema: bilateral lower extremity   R LE ROM WFL   R LE Strength WFL   L LE ROM WFL   L LE Strength  WFL     Balance          Static Sitting independent   Dynamic Sitting independent   Static Standing independent   Dynamic Standing supervision assistance, reaching outside VÍCTOR >8 inches, no loss of balance  Dynamic head turns with gait: no loss of balance                Functional Mobility:    Bed Mobility  Deferred, patient sitting up in chair   Transfers Sit to Stand:  modified independent with RW   Gait  Gait Distance: 125 ft with RW  Assistance Level: supervision assistance   Description: reciprocal strides, increased lateral weight shift, decreased speed, kyphotic posture. Patient cued for posture, improved with cuing. Patient reports gait is at baseline.         AM-PAC 6 CLICK MOBILITY  Total Score:23       Therapeutic Activities and Exercises:   Patient educated on role of therapy, goals of session, benefits of out of bed mobility. Patient agreeable to mobilize with therapy.  Discussed PT plan of care during hospitalization. Patient educated that they need to call for assistance to mobilize out of bed. Whiteboard updated as appropriate. Patient educated on how their diagnosis impacts their mobility within PT scope of practice. Patient encouraged to ambulate in [place] with RN supervision, she is in agreement.     AM-PAC 6 CLICK MOBILITY  Total Score:23     Patient left up in chair with all lines intact, call button in reach and RN notified.    GOALS:   Multidisciplinary Problems      Physical Therapy Goals        Problem: Physical Therapy Goal    Goal Priority Disciplines Outcome Goal Variances Interventions   Physical Therapy Goal     PT, PT/OT Ongoing (interventions implemented as appropriate)     Description:  Patient is at her functional baseline, modified independent with mobility.                    History:     Past Medical History:   Diagnosis Date    Allergy     Anemia 2012    Arthritis     CHF (congestive heart failure)     CKD (chronic kidney disease) stage 3, GFR 30-59 ml/min 2012    Clotting disorder     Colon polyp     Deep vein thrombophlebitis of left leg 2012    Degenerative disc disease     Diabetic peripheral neuropathy associated with type 2 diabetes mellitus 2012    GERD (gastroesophageal reflux disease)     HTN (hypertension) 2012    Hyperlipidemia 2012    Lead-induced gout of ankle or foot     right going on three weeks    Nonproliferative diabetic retinopathy of right eye 2012    Obstructive sleep apnea 2012    Osteoporosis     Proliferative diabetic retinopathy of left eye 2012    Stroke 2003    Type 2 diabetes mellitus with diabetic polyneuropathy 2012    Ulcer        Past Surgical History:   Procedure Laterality Date    CATARACT EXTRACTION W/  INTRAOCULAR LENS IMPLANT Left 3/2002    left     CATARACT EXTRACTION W/  INTRAOCULAR LENS IMPLANT Right     OD dr. olivares     SECTION      COLONOSCOPY N/A 2018    Performed by [facility]ian [facility] at [facility] (2ND FLR)    CYST REMOVAL  2011    left side of face    ESOPHAGOGASTRODUODENOSCOPY (EGD) N/A 2018    Performed by Faizan Mac [facility] at [facility] END[facility] (2ND FLR)    EYE SURGERY Bilateral 2012    eye implants    GANGLION CYST EXCISION  2007    Right wrist    INSERTION, IOL PROSTHESIS Right 2012    Performed by Linda Olivares MD at [facility] OR 1ST FLR    Pan Retinal Photocoagulation Bilateral      Dr. Monterroso (Proliferative Diabetic Retinopathy)    PHACOEMULSIFICATION, CATARACT Right 12/19/2012    [facility]    TOTAL ABDOMINAL HYSTERECTOMY  1982    TOTAL KNEE ARTHROPLASTY  2/2006    left    TRIGGER FINGER RELEASE  9/18/2009       Time Tracking:     PT Received On: 05/25/19  PT Start Time: 0816     PT Stop Time: 0835  PT Total Time (min): 19 min     Billable Minutes: Evaluation 19      Charu Bah, PT  05/25/2019

## 2019-05-25 NOTE — PLAN OF CARE
Problem: Adult Inpatient Plan of Care  Goal: Plan of Care Review  Outcome: Ongoing (interventions implemented as appropriate)  POC reviewed with patient; understanding verbalized. Pt AAOx4. Tele maintained, NSR. Pt has a pending discharge. She was unable to afford her copay for her insulin. Diabetic diet 2000 kcal diet. Achs accuchecks, scheduled insulin and sliding scale administered. PT/OT worked with pt this shift; walker delivered to bedside. Pt. with nonskid footwear on, bed in lowest position, and locked with bed rails up x2.  Pt has been up to chair for most of shift. Pt. has call light and personal items within reach. Patient ambulates in room independently. VSS and afebrile this shift. All questions and concerns addressed at this time. Will continue to monitor.

## 2019-05-25 NOTE — PROGRESS NOTES
"[facility]  Endocrinology  Progress Note    Admit Date: 2019     Reason for Consult: Management of T2DM, Hyperglycemia     Surgical Procedure and Date: n/a    Diabetes diagnosis year:     Home Diabetes Medications:  U500 pens 150 (130) units with breakfast, 100 (90) units with lunch, and 75 (65) units with dinner - doses based on size of meal   victoza 1.2 mg daily   Lab Results   Component Value Date    HGBA1C 9.9 (H) 2019         How often checking glucose at home? 4-5 times a day   BG readings on regimen: 100-200s ( reading high night before coming into hospital)  Hypoglycemia on the regimen?  Yes about once every couple of weeks   Missed doses on regimen?  No    Diabetes Complications include:     Hyperglycemia, Hypoglycemia , Diabetic nephropathy  , Diabetic retinopathy  and Diabetic peripheral neuropathy     Complicating diabetes co morbidities:   CHF and CKD      HPI:   Patient is a 68 y.o. female with a diagnosis of poorly controlled type 2 DM on U500 insulin. Last seen in endocrine clinic by Ursula Salinas NP on 19. Also with RUFINA, CHF, CKD, HTN, and HLD. Insulin being titrated up outpatient over last month. BG reasonably well controlled at last visit. Patient endorses migraine, blurry vision, and increased urination. BG meter reading high night before presented to ED.             Interval HPI:   Overnight events:  Remains on hem/onc floor. BG above goal overnight and this AM with scheduled insulin and correction scale coverage.   Eatin%  Nausea: No  Hypoglycemia and intervention: No  Fever: No  TPN and/or TF: No    /65 (BP Location: Right arm, Patient Position: Sitting)   Pulse 91   Temp 98.5 °F (36.9 °C) (Oral)   Resp 18   Ht 5' 5" (1.651 m)   Wt (!) 142 kg (313 lb 0.9 oz)   SpO2 98%   Breastfeeding? No   BMI 52.09 kg/m²      Labs Reviewed and Include    Recent Labs   Lab 19  0332   *   CALCIUM 9.9      K 4.5   CO2 25    "   BUN 15   CREATININE 1.6*     Lab Results   Component Value Date    WBC 6.27 05/25/2019    HGB 9.8 (L) 05/25/2019    HCT 30.3 (L) 05/25/2019    MCV 89 05/25/2019     05/25/2019     No results for input(s): TSH, FREET4 in the last 168 hours.  Lab Results   Component Value Date    HGBA1C 9.9 (H) 05/01/2019       Nutritional status:   Body mass index is 52.09 kg/m².  Lab Results   Component Value Date    ALBUMIN 4.3 05/23/2019    ALBUMIN 3.3 (L) 05/01/2019    ALBUMIN 3.6 04/09/2019     No results found for: PREALBUMIN    Estimated Creatinine Clearance: 47.7 mL/min (A) (based on SCr of 1.6 mg/dL (H)).    Accu-Checks  Recent Labs     05/24/19  0915 05/24/19  1007 05/24/19  1053 05/24/19  1208 05/24/19  1318 05/24/19  1431 05/24/19  1718 05/24/19  2202 05/25/19  0209 05/25/19  0809   POCTGLUCOSE 261* 249* 258* 212* 227* 229* 281* 184* 210* 327*       Current Medications and/or Treatments Impacting Glycemic Control  Immunotherapy:    Immunosuppressants     None        Steroids:   Hormones (From admission, onward)    None        Pressors:    Autonomic Drugs (From admission, onward)    None        Hyperglycemia/Diabetes Medications:   Antihyperglycemics (From admission, onward)    Start     Stop Route Frequency Ordered    05/25/19 0715  insulin aspart U-100 pen 20 Units      -- SubQ 3 times daily with meals 05/24/19 1756    05/24/19 2100  insulin detemir U-100 pen 30 Units      -- SubQ 2 times daily 05/24/19 1756    05/24/19 1842  insulin aspart U-100 pen 1-10 Units      -- SubQ Before meals & nightly PRN 05/24/19 1742          ASSESSMENT and PLAN    * Diabetic ketoacidosis without coma associated with type 2 diabetes mellitus  See Type 2 DM.     Type 2 diabetes mellitus with diabetic polyneuropathy, with long-term current use of insulin  BG goal 140-180 while inpatient.     Previous admission in 2007 for DKA and admission in 2011 for hypoglycemia.     Patient takes ~ 285-325 units TDD of insulin at home (BG  reasonably well controlled with increased doses over last month until prior to admission per BG logs at last visit). On U500 at home.     Will defer orders to primary team:   Recommend to -   Increase Levemir 35  units BID  Continue Novolog 20 units with meals.   BG monitoring ac/hs/0200 and moderate dose correction scale given insulin resistance.     (initial considerations with insulin dosing: based on IV insulin infusion requirements 2.2 u/hr not enough insulin 2.8 u/hr too much basal; 2.5 u/hr = 60 units of basal insulin needed and prandial 20 units for basal/pradial matching; from home dosing ~285 units TDD for lower requirements. Given U500 decreased by 50% on admission and then split 50/50 basal prandial ~70 units basal and 23 units with meals; dosing also consistent with ~0.8 units/kg dosing).     Patient will likely need up titration of insulin given that she appears to be very insulin resistance.     Discharge plans- No clear cause of DKA. Reasonably well controlled 2 weeks ago at endocrine visit.  U500 insulin 150 units if large breakfast (130 units if small breakfast), 100 units if large lunch (90 units if small lunch), increase 80 units if large dinner (70 units if small dinner). Discard current insulin pen. Open new pen. BG logs to Ursula Salinas NP on Monday for titration of insulin outpatient. Increase to victoza 1.8 mg daily. Will review injection technique with patient prior to discharge to confirm appropriate administration.     ADDENDUM: u500 pens $419.15; u500 vial $361.83 for monthly supply per pharmacy. Patient very insulin resistant and will likely need u500 insulin in order to control BG at home. Next alternative is relion 70/30 100 units with breakfast, 90 units with lunch, and 80 units with dinner;  - will need new vial every 3 days (vial ~ 25 dollar and will add to about 260 dollars a month and will likely have difficulty controlling BG off of u500; also with increased risk of  hypoglycemia given patient will likely need TID dosing which increases risk of insulin stacking.). Discharge postponed due to inability to afford insulin. Case management on case. Will also likely need PAP program involvement.     Stage 4 chronic kidney disease  .Avoid insulin stacking and hypoglycemia.      GEORGE (acute kidney injury)  Avoid insulin stacking and hypoglycemia.        Class 3 severe obesity in adult  May increase insulin resistance.   Body mass index is 52.09 kg/m².          Niurka Beckett NP  Endocrinology  [facility]

## 2019-05-25 NOTE — ASSESSMENT & PLAN NOTE
BG goal 140-180 while inpatient.     Previous admission in 2007 for DKA and admission in 2011 for hypoglycemia.     Patient takes ~ 285-325 units TDD of insulin at home (BG reasonably well controlled with increased doses over last month until prior to admission per BG logs at last visit). On U500 at home.     Will defer orders to primary team:   Recommend to -   Increase Levemir 35  units BID  Continue Novolog 20 units with meals.   BG monitoring ac/hs/0200 and moderate dose correction scale given insulin resistance.     (initial considerations with insulin dosing: based on IV insulin infusion requirements 2.2 u/hr not enough insulin 2.8 u/hr too much basal; 2.5 u/hr = 60 units of basal insulin needed and prandial 20 units for basal/pradial matching; from home dosing ~285 units TDD for lower requirements. Given U500 decreased by 50% on admission and then split 50/50 basal prandial ~70 units basal and 23 units with meals; dosing also consistent with ~0.8 units/kg dosing).     Patient will likely need up titration of insulin given that she appears to be very insulin resistance.     Discharge plans- Patient to discharge today per primary team; will buy relion 70/30 (can afford 3 vials per primary and will work with PAP next week for assistance to obtain u500). relion 70/30 100 units with breakfast, 90 units with lunch, and 80 units with dinner. Patient has used vial and syringe previously. Will review. Patient verbalizes s/sx of hypoglycemia and treatment.     u500 pens 419.15; u500 vial 361.83 for monthly supply. Patient very insulin resistant and will likely need u500 insulin in order to control BG at home. Next alternative is relion 70/30 100 units with breakfast, 90 units with lunch, and 80 units with dinner;  - will need new vial every 3 days (vial ~ 25 dollar and will add to about 260 dollars a month and will likely have difficulty controlling BG off of u500; also with increased risk of hypoglycemia given  patient will likely need TID dosing which increases risk of insulin stacking.).

## 2019-05-25 NOTE — PLAN OF CARE
Problem: Adult Inpatient Plan of Care  Goal: Plan of Care Review  Outcome: Ongoing (interventions implemented as appropriate)  POC reviewed with patient; understanding verbalized. NS @ 100/hr dc'd. Pt remains up with walker to bathroom. Free from falls and injuries this shift. Tele in place, with normal sinus rhythm. Diabetic diet. Good appetite. No c/o N/V. Accuchecks ACHS and 2 am. Coverage administered this shift. Pt voids clear yellow urine in hat via bathroom toilet. One formed BM this shift. Pt. with nonskid footwear on, bed in lowest position, and locked with bed rails up x 2. Pt. has call light and personal items within reach. VSS and afebrile this shift. All questions and concerns addressed at this time. Will continue to closely monitor.

## 2019-05-25 NOTE — PLAN OF CARE
CM faxed orders to [facility] at [phone], called to inform Mahin of order who asked if it was available for CM to get, CYNTHIA along with Carmel Guerrero received to [facility] for the [facility] office and delivered it to the patients bedside.

## 2019-05-25 NOTE — SUBJECTIVE & OBJECTIVE
"Interval HPI:   Overnight events:  Remains on hem/onc floor. BG above goal overnight and this AM with scheduled insulin and correction scale coverage.   Eatin%  Nausea: No  Hypoglycemia and intervention: No  Fever: No  TPN and/or TF: No    /65 (BP Location: Right arm, Patient Position: Sitting)   Pulse 91   Temp 98.5 °F (36.9 °C) (Oral)   Resp 18   Ht 5' 5" (1.651 m)   Wt (!) 142 kg (313 lb 0.9 oz)   SpO2 98%   Breastfeeding? No   BMI 52.09 kg/m²     Labs Reviewed and Include    Recent Labs   Lab 19  0332   *   CALCIUM 9.9      K 4.5   CO2 25      BUN 15   CREATININE 1.6*     Lab Results   Component Value Date    WBC 6.27 2019    HGB 9.8 (L) 2019    HCT 30.3 (L) 2019    MCV 89 2019     2019     No results for input(s): TSH, FREET4 in the last 168 hours.  Lab Results   Component Value Date    HGBA1C 9.9 (H) 2019       Nutritional status:   Body mass index is 52.09 kg/m².  Lab Results   Component Value Date    ALBUMIN 4.3 2019    ALBUMIN 3.3 (L) 2019    ALBUMIN 3.6 2019     No results found for: PREALBUMIN    Estimated Creatinine Clearance: 47.7 mL/min (A) (based on SCr of 1.6 mg/dL (H)).    Accu-Checks  Recent Labs     19  0915 19  1007 19  1053 19  1208 19  1318 19  1431 19  1718 19  2202 19  0209 19  0809   POCTGLUCOSE 261* 249* 258* 212* 227* 229* 281* 184* 210* 327*       Current Medications and/or Treatments Impacting Glycemic Control  Immunotherapy:    Immunosuppressants     None        Steroids:   Hormones (From admission, onward)    None        Pressors:    Autonomic Drugs (From admission, onward)    None        Hyperglycemia/Diabetes Medications:   Antihyperglycemics (From admission, onward)    Start     Stop Route Frequency Ordered    19 0715  insulin aspart U-100 pen 20 Units      -- SubQ 3 times daily with meals 19 1753    " 05/24/19 2100  insulin detemir U-100 pen 30 Units      -- SubQ 2 times daily 05/24/19 1756    05/24/19 1842  insulin aspart U-100 pen 1-10 Units      -- SubQ Before meals & nightly PRN 05/24/19 1741

## 2019-05-25 NOTE — DISCHARGE SUMMARY
[facility] Hospital Medicine  Discharge Summary      Patient Name: Annika Mac  MRN: [id number]  Admission Date: 5/23/2019  Hospital Length of Stay: 2 days  Discharge Date and Time:  05/25/2019 1:17 PM  Attending Physician: Juana Hernandez MD   Discharging Provider: Ping Funk MD  Primary Care Provider: Mamie Cotton MD  [facility] 2 Ping Funk MD    HPI:   Annika Mac is a  a 68 y.o female admitted to Hospital medicine for mild DKA in the setting of her not contorlling her diet. She has a past medical history of DM  ( on insulin and victoza ) , HTN, HLD, , CKD, HFpEF, stroke who presented to the ED with a complaint of hyperglycemia. She reported that on Tuesday she started having headaches and polyuria. The only thing different in her daily activities was she was eating two popsicles because she felt dehydrated on the day she had the headaches. She then noticed that every time she checked her sugar the glucometer could not read     Patient is taking victoza and SSI that has been steady regimen for her but she was told by her PCP that she is likely going to need some basal insulin. She has not missed any doses . She endorse nausea and inability to take home meds. She also has been having worsening Bilateral leg edema.  She denies fever, vomiting, dysuria, abdominal pain, or diarrhea.     Of note: hospitalized once for hypoglycemia never for hyperglycemia or DKA    In the ED found to have sugars > 500, bhydroxy elevated to 0.6, mildly acidotic, gap was closed, sugars fluctuating after given 4 L of NS and regular insulin. She was started on insulin drip     * No surgery found *      Hospital Course:   Admitted to [facility] for mild DKA.Started on IVF, insulin drip. Correct electrolytes. Monitor BMP q6 hrs. BG improved to around 250.  Endocrine consult to help with medication management for DM. Pt was started on levemir 20u BID, insulin drip and IVF were  stopped after 2 hrs of overlap. Diabetic diet resumed. Pt feels well    Review of Systems   Constitutional: Negative for chills, diaphoresis, fever, malaise/fatigue and weight loss.   Eyes: Negative for blurred vision.   Respiratory: Negative for cough, hemoptysis, sputum production and shortness of breath.    Cardiovascular: Negative for chest pain, palpitations, orthopnea and leg swelling.   Gastrointestinal: Negative for abdominal pain, heartburn, nausea and vomiting.   Genitourinary: Negative for dysuria.   Musculoskeletal: Negative for back pain, myalgias and neck pain.   Neurological: Negative for dizziness, tingling and sensory change.     Physical Exam   Constitutional: She is oriented to person, place, and time. No distress.   Eyes: Pupils are equal, round, and reactive to light. Conjunctivae are normal.   Cardiovascular: Normal rate, regular rhythm and normal heart sounds.   No murmur heard.  Pulmonary/Chest: Effort normal and breath sounds normal. No respiratory distress. She has no wheezes. She has no rales.   Abdominal: Soft. Bowel sounds are normal. She exhibits no distension. There is no tenderness.   Musculoskeletal: Normal range of motion. She exhibits no edema or tenderness.   Neurological: She is alert and oriented to person, place, and time.   Skin: Skin is warm and dry. She is not diaphoretic.         Consults:   Consults (From admission, onward)        Status Ordering Provider     Inpatient consult to Endocrinology  Once     Provider:  (Not yet assigned)    Completed ELSY GALDAMEZ     Inpatient consult to Registered Dietitian/Nutritionist  Once     Provider:  (Not yet assigned)    Completed JACI RIVERA          No new Assessment & Plan notes have been filed under this hospital service since the last note was generated.  Service: Hospital Medicine    Final Active Diagnoses:    Diagnosis Date Noted POA    PRINCIPAL PROBLEM:  Diabetic ketoacidosis without coma associated with type 2 diabetes  "mellitus [E11.10] 05/23/2019 Unknown    Class 3 severe obesity in adult [E66.01] 05/24/2019 Unknown    GEORGE (acute kidney injury) [N17.9] 05/23/2019 Unknown    Stage 4 chronic kidney disease [N18.4] 02/13/2019 Yes    GERD (gastroesophageal reflux disease) [K21.9] 01/27/2017 Yes    Essential hypertension [I10] 10/27/2016 Yes    Gout, chronic [M1A.9XX0] 09/24/2012 Yes    Type 2 diabetes mellitus with diabetic polyneuropathy, with long-term current use of insulin [E11.42, Z79.4] 07/27/2012 Not Applicable    History of stroke [Z86.73] 08/01/2003 Not Applicable      Problems Resolved During this Admission:       Discharged Condition: stable    Disposition: Home-Health Care [facility]    Follow Up:  Follow-up Information     Mamie Cotton MD In 2 days.    Specialty:  Internal Medicine  Why:  BP medication resume, candesartan held due to low blood pressure  Contact information:  [address]  [place] [address]  [phone]                 Patient Instructions:      WALKER FOR HOME USE     Order Specific Question Answer Comments   Type of Walker: Rollator    With wheels? Yes    Height: 5' 5" (1.651 m)    Weight: 142 kg (313 lb 0.9 oz)    Length of need (1-99 months): 99    Does patient have medical equipment at home? walker, rolling    Does patient have medical equipment at home? rollator    Does patient have medical equipment at home? shower chair    Does patient have medical equipment at home? cane, straight    Does patient have medical equipment at home? wheelchair    Please check all that apply: Patient's condition impairs ambulation.    Please check all that apply: Patient is unable to safely ambulate without equipment.      BLOOD GLUCOSE MONITOR FOR HOME USE     Order Specific Question Answer Comments   Height: 5' 5" (1.651 m)    Weight: 142 kg (313 lb 0.9 oz)    Does patient have medical equipment at home? walker, rolling    Does patient have medical equipment at home? rollator    Does patient have " "medical equipment at home? shower chair    Does patient have medical equipment at home? cane, straight    Does patient have medical equipment at home? wheelchair    Length of need (1-99 months): 99      Ambulatory Referral to Endocrinology   Referral Priority: Routine Referral Type: Consultation   Requested Specialty: Endocrinology   Number of Visits Requested: 1     Ambulatory Referral to Endocrinology   Referral Priority: Routine Referral Type: Consultation   Requested Specialty: Endocrinology   Number of Visits Requested: 1       Significant Diagnostic Studies: Labs:   CMP   Recent Labs   Lab 05/24/19  0954 05/24/19  1238 05/25/19  0332    138 136   K 4.0 4.3 4.5    106 104   CO2 24 25 25   * 226* 232*   BUN 17 16 15   CREATININE 1.4 1.4 1.6*   CALCIUM 9.8 9.9 9.9   ANIONGAP 8 7* 7*   ESTGFRAFRICA 44.2* 44.2* 37.6*   EGFRNONAA 38.4* 38.4* 32.6*   , CBC   Recent Labs   Lab 05/24/19  0443 05/25/19  0332   WBC 5.79 6.27   HGB 9.7* 9.8*   HCT 29.9* 30.3*    215   , INR   Lab Results   Component Value Date    INR 1.0 03/10/2006    INR 1.0 03/08/2006    INR 1.1 02/22/2006   , Lipid Panel   Lab Results   Component Value Date    CHOL 153 03/26/2019    HDL 40 03/26/2019    LDLCALC 83.4 03/26/2019    TRIG 148 03/26/2019    CHOLHDL 26.1 03/26/2019   , Troponin No results for input(s): TROPONINI in the last 168 hours. and A1C:   Recent Labs   Lab 01/02/19  1035 05/01/19  0940   HGBA1C 9.3* 9.9*       Pending Diagnostic Studies:     None         Medications:  Reconciled Home Medications:      Medication List      CHANGE how you take these medications    * BD ULTRA-FINE KIKA PEN NEEDLE 32 gauge x 5/32" Ndle  Generic drug:  pen needle, diabetic  To use 4 times daily  What changed:  Another medication with the same name was added. Make sure you understand how and when to take each.     * pen needle, diabetic 32 gauge x 5/32" Ndle  Preferred by insurance. Injecting 4 times daily.  What changed:  You were " already taking a medication with the same name, and this prescription was added. Make sure you understand how and when to take each.     insulin regular hum U-500 conc 500 unit/mL (3 mL) Inpn  Commonly known as:  HumuLIN R U-500 (Conc) Kwikpen  130-150 units with breakfast;  units with lunch, 70- 80 units with dinner; TDD max 330  What changed:    · how much to take  · how to take this  · when to take this  · additional instructions     liraglutide 0.6 mg/0.1 mL (18 mg/3 mL) subq PNIJ 0.6 mg/0.1 mL (18 mg/3 mL) Pnij  Commonly known as:  VICTOZA 2-MILAN  1.8 mg daily.  What changed:  additional instructions         * This list has 2 medication(s) that are the same as other medications prescribed for you. Read the directions carefully, and ask your doctor or other care provider to review them with you.            CONTINUE taking these medications    * allopurinol 100 MG tablet  Commonly known as:  ZYLOPRIM  Take 300 mg by mouth once daily.     * allopurinol 300 MG tablet  Commonly known as:  ZYLOPRIM  Take 1 tablet (300 mg total) by mouth once daily.     amLODIPine 10 MG tablet  Commonly known as:  NORVASC  Take 1 tablet (10 mg total) by mouth once daily.     aspirin 81 MG Chew  Take 1 tablet (81 mg total) by mouth once daily.     blood-glucose meter kit  Use as instructed, true test/result meter     candesartan 16 MG tablet  Commonly known as:  ATACAND  Take 1 tablet (16 mg total) by mouth once daily.     colchicine 0.6 mg tablet  Commonly known as:  COLCRYS  Take 0.6 mg by mouth as needed.     DULoxetine 30 MG capsule  Commonly known as:  CYMBALTA  Take 1 capsule (30 mg total) by mouth once daily.     famotidine 20 MG tablet  Commonly known as:  PEPCID  Take 1 tablet (20 mg total) by mouth 2 (two) times daily.     fexofenadine 180 MG tablet  Commonly known as:  ALLEGRA  TAKE 1 TABLET BY MOUTH ONCE DAILY     finasteride 5 mg tablet  Commonly known as:  PROSCAR  TAKE 1 TABLET(5 MG) BY MOUTH EVERY DAY      fluocinonide 0.05 % external solution  Commonly known as:  LIDEX  AAA scalp qday - bid prn pruritus     furosemide 20 MG tablet  Commonly known as:  LASIX  Take 1 tablet (20 mg total) by mouth once daily. Take an extra pill if short of breath or leg swelling     ketoconazole 2 % shampoo  Commonly known as:  NIZORAL  Wash hair with medicated shampoo at least 2x/week - let sit on scalp at least 5 minutes prior to rinsing     labetalol 300 MG tablet  Commonly known as:  NORMODYNE  Take 1 tablet (300 mg total) by mouth 2 (two) times daily.     lancets 31 gauge Misc  1 lancet by Misc.(Non-Drug; Combo Route) route 4 (four) times daily.     multivitamin per tablet  Commonly known as:  THERAGRAN  Take 1 tablet by mouth once daily.     pregabalin 100 MG capsule  Commonly known as:  LYRICA  TAKE 1 CAPSULE BY MOUTH TWO TIMES DAILY     simvastatin 40 MG tablet  Commonly known as:  ZOCOR  Take 0.5 tablets (20 mg total) by mouth every evening.     triamcinolone acetonide 0.1% 0.1 % ointment  Commonly known as:  KENALOG  AAA bid hands     TRUETEST TEST STRIPS Strp  Generic drug:  blood sugar diagnostic  1 strip by Misc.(Non-Drug; Combo Route) route 4 (four) times daily.         * This list has 2 medication(s) that are the same as other medications prescribed for you. Read the directions carefully, and ask your doctor or other care provider to review them with you.            STOP taking these medications    HYDROcodone-acetaminophen  mg per tablet  Commonly known as:  NORCO        ASK your doctor about these medications    * OPW TEST CLAIM - DO NOT FILL  Inject into the skin. OPW test claim. Do not fill.     * OPW TEST CLAIM - DO NOT FILL  Inject into the skin. OPW test claim. Do not fill.         * This list has 2 medication(s) that are the same as other medications prescribed for you. Read the directions carefully, and ask your doctor or other care provider to review them with you.                Indwelling Lines/Drains  at time of discharge:   Lines/Drains/Airways          None          Time spent on the discharge of patient: 35 minutes  Patient was seen and examined on the date of discharge and determined to be suitable for discharge.         Ping Funk MD  Department of Hospital Medicine  [facility]

## 2019-05-25 NOTE — PLAN OF CARE
Problem: Physical Therapy Goal  Goal: Physical Therapy Goal  Patient is at her functional baseline, modified independent with mobility.  Outcome: Ongoing (interventions implemented as appropriate)  Discharge skilled therapy, patient at baseline.   Charu Bah, PT   5/25/2019

## 2019-05-25 NOTE — NURSING
"Pt unable to afford copay for insulin. Pt states that she does not have insulin at home. She states "not since the coverage gap." MD for Internal Medicine 2 and  Constantin notified. MD to contact Endocrine team. Pt not being discharged tonight.   "

## 2019-05-26 VITALS
BODY MASS INDEX: 48.82 KG/M2 | HEIGHT: 65 IN | HEART RATE: 89 BPM | TEMPERATURE: 98 F | WEIGHT: 293 LBS | SYSTOLIC BLOOD PRESSURE: 116 MMHG | OXYGEN SATURATION: 95 % | RESPIRATION RATE: 18 BRPM | DIASTOLIC BLOOD PRESSURE: 87 MMHG

## 2019-05-26 LAB
ANION GAP SERPL CALC-SCNC: 7 MMOL/L (ref 8–16)
BACTERIA BLD CULT: NORMAL
BASOPHILS # BLD AUTO: 0.03 K/UL (ref 0–0.2)
BASOPHILS NFR BLD: 0.5 % (ref 0–1.9)
BUN SERPL-MCNC: 21 MG/DL (ref 8–23)
CALCIUM SERPL-MCNC: 10 MG/DL (ref 8.7–10.5)
CHLORIDE SERPL-SCNC: 106 MMOL/L (ref 95–110)
CO2 SERPL-SCNC: 24 MMOL/L (ref 23–29)
CREAT SERPL-MCNC: 1.6 MG/DL (ref 0.5–1.4)
DIFFERENTIAL METHOD: ABNORMAL
EOSINOPHIL # BLD AUTO: 0.4 K/UL (ref 0–0.5)
EOSINOPHIL NFR BLD: 6.1 % (ref 0–8)
ERYTHROCYTE [DISTWIDTH] IN BLOOD BY AUTOMATED COUNT: 13.8 % (ref 11.5–14.5)
EST. GFR  (AFRICAN AMERICAN): 37.6 ML/MIN/1.73 M^2
EST. GFR  (NON AFRICAN AMERICAN): 32.6 ML/MIN/1.73 M^2
GLUCOSE SERPL-MCNC: 222 MG/DL (ref 70–110)
HCT VFR BLD AUTO: 29.1 % (ref 37–48.5)
HGB BLD-MCNC: 9.6 G/DL (ref 12–16)
IMM GRANULOCYTES # BLD AUTO: 0.02 K/UL (ref 0–0.04)
IMM GRANULOCYTES NFR BLD AUTO: 0.3 % (ref 0–0.5)
LYMPHOCYTES # BLD AUTO: 1.8 K/UL (ref 1–4.8)
LYMPHOCYTES NFR BLD: 32.1 % (ref 18–48)
MAGNESIUM SERPL-MCNC: 1.7 MG/DL (ref 1.6–2.6)
MCH RBC QN AUTO: 29.1 PG (ref 27–31)
MCHC RBC AUTO-ENTMCNC: 33 G/DL (ref 32–36)
MCV RBC AUTO: 88 FL (ref 82–98)
MONOCYTES # BLD AUTO: 0.7 K/UL (ref 0.3–1)
MONOCYTES NFR BLD: 12.2 % (ref 4–15)
NEUTROPHILS # BLD AUTO: 2.8 K/UL (ref 1.8–7.7)
NEUTROPHILS NFR BLD: 48.8 % (ref 38–73)
NRBC BLD-RTO: 0 /100 WBC
PHOSPHATE SERPL-MCNC: 4.1 MG/DL (ref 2.7–4.5)
PLATELET # BLD AUTO: 206 K/UL (ref 150–350)
PMV BLD AUTO: 11.6 FL (ref 9.2–12.9)
POCT GLUCOSE: 112 MG/DL (ref 70–110)
POCT GLUCOSE: 168 MG/DL (ref 70–110)
POCT GLUCOSE: 178 MG/DL (ref 70–110)
POCT GLUCOSE: 281 MG/DL (ref 70–110)
POTASSIUM SERPL-SCNC: 4.3 MMOL/L (ref 3.5–5.1)
RBC # BLD AUTO: 3.3 M/UL (ref 4–5.4)
SODIUM SERPL-SCNC: 137 MMOL/L (ref 136–145)
WBC # BLD AUTO: 5.73 K/UL (ref 3.9–12.7)

## 2019-05-26 PROCEDURE — 63600175 PHARM REV CODE 636 W HCPCS: Performed by: STUDENT IN AN ORGANIZED HEALTH CARE EDUCATION/TRAINING PROGRAM

## 2019-05-26 PROCEDURE — 99232 SBSQ HOSP IP/OBS MODERATE 35: CPT | Mod: ,,, | Performed by: NURSE PRACTITIONER

## 2019-05-26 PROCEDURE — 84100 ASSAY OF PHOSPHORUS: CPT

## 2019-05-26 PROCEDURE — 25000003 PHARM REV CODE 250: Performed by: STUDENT IN AN ORGANIZED HEALTH CARE EDUCATION/TRAINING PROGRAM

## 2019-05-26 PROCEDURE — 83735 ASSAY OF MAGNESIUM: CPT

## 2019-05-26 PROCEDURE — 36415 COLL VENOUS BLD VENIPUNCTURE: CPT

## 2019-05-26 PROCEDURE — 99232 PR SUBSEQUENT HOSPITAL CARE,LEVL II: ICD-10-PCS | Mod: ,,, | Performed by: NURSE PRACTITIONER

## 2019-05-26 PROCEDURE — 85025 COMPLETE CBC W/AUTO DIFF WBC: CPT

## 2019-05-26 PROCEDURE — 99239 HOSP IP/OBS DSCHRG MGMT >30: CPT | Mod: GC,,, | Performed by: HOSPITALIST

## 2019-05-26 PROCEDURE — 99239 PR HOSPITAL DISCHARGE DAY,>30 MIN: ICD-10-PCS | Mod: GC,,, | Performed by: HOSPITALIST

## 2019-05-26 PROCEDURE — 80048 BASIC METABOLIC PNL TOTAL CA: CPT

## 2019-05-26 RX ADMIN — FAMOTIDINE 20 MG: 20 TABLET, FILM COATED ORAL at 08:05

## 2019-05-26 RX ADMIN — INSULIN ASPART 20 UNITS: 100 INJECTION, SOLUTION INTRAVENOUS; SUBCUTANEOUS at 08:05

## 2019-05-26 RX ADMIN — INSULIN ASPART 20 UNITS: 100 INJECTION, SOLUTION INTRAVENOUS; SUBCUTANEOUS at 04:05

## 2019-05-26 RX ADMIN — ALLOPURINOL 300 MG: 300 TABLET ORAL at 08:05

## 2019-05-26 RX ADMIN — INSULIN ASPART 6 UNITS: 100 INJECTION, SOLUTION INTRAVENOUS; SUBCUTANEOUS at 08:05

## 2019-05-26 RX ADMIN — INSULIN ASPART 20 UNITS: 100 INJECTION, SOLUTION INTRAVENOUS; SUBCUTANEOUS at 12:05

## 2019-05-26 RX ADMIN — ENOXAPARIN SODIUM 40 MG: 100 INJECTION SUBCUTANEOUS at 04:05

## 2019-05-26 RX ADMIN — DULOXETINE 30 MG: 30 CAPSULE, DELAYED RELEASE ORAL at 08:05

## 2019-05-26 RX ADMIN — INSULIN ASPART 2 UNITS: 100 INJECTION, SOLUTION INTRAVENOUS; SUBCUTANEOUS at 12:05

## 2019-05-26 RX ADMIN — PREGABALIN 100 MG: 75 CAPSULE ORAL at 08:05

## 2019-05-26 RX ADMIN — LABETALOL HYDROCHLORIDE 300 MG: 100 TABLET, FILM COATED ORAL at 08:05

## 2019-05-26 RX ADMIN — ASPIRIN 81 MG CHEWABLE TABLET 81 MG: 81 TABLET CHEWABLE at 08:05

## 2019-05-26 RX ADMIN — AMLODIPINE BESYLATE 10 MG: 10 TABLET ORAL at 08:05

## 2019-05-26 RX ADMIN — INSULIN ASPART 1 UNITS: 100 INJECTION, SOLUTION INTRAVENOUS; SUBCUTANEOUS at 02:05

## 2019-05-26 NOTE — PLAN OF CARE
Pt d/c to home w/ h/h services by [facility]. Pt's physical address is [address] [place] [id number].     05/26/19 1345   Final Note   Assessment Type Final Discharge Note   Anticipated Discharge Disposition Home-Health   What phone number can be called within the next 1-3 days to see how you are doing after discharge? [phone]   Right Care Referral Info   Post Acute Recommendation Home-care   Referral Type home health   [facility]

## 2019-05-26 NOTE — PLAN OF CARE
Problem: Adult Inpatient Plan of Care  Goal: Plan of Care Review  Outcome: Ongoing (interventions implemented as appropriate)  POC reviewed with patient; understanding verbalized. Tele monitor in place. Pt ACHS & 2am with coverage given this shift. Pt on diabetic diet. Pt with nonskid footwear on, bed in lowest position, and locked with bed rails up x2. Pt instructed to call prior to getting OOB. Pt has call light and person items within reach. Patient ambulates in room with walker. VSS and afebrile this shift. All questions and concerns addressed at this time. Will continue to monitor.

## 2019-05-26 NOTE — SUBJECTIVE & OBJECTIVE
"Interval HPI:   Overnight events: Remains on hem/onc floor. BG at or above goal with scheduled insulin and correction scale coverage. Tentative discharge today.   Eatin%  Nausea: No  Hypoglycemia and intervention: No  Fever: No  TPN and/or TF: No      /63 (BP Location: Right arm, Patient Position: Sitting)   Pulse 79   Temp 98.2 °F (36.8 °C) (Oral)   Resp 18   Ht 5' 5" (1.651 m)   Wt (!) 142 kg (313 lb 0.9 oz)   SpO2 100%   Breastfeeding? No   BMI 52.09 kg/m²     Labs Reviewed and Include    Recent Labs   Lab 19  0523   *   CALCIUM 10.0      K 4.3   CO2 24      BUN 21   CREATININE 1.6*     Lab Results   Component Value Date    WBC 5.73 2019    HGB 9.6 (L) 2019    HCT 29.1 (L) 2019    MCV 88 2019     2019     No results for input(s): TSH, FREET4 in the last 168 hours.  Lab Results   Component Value Date    HGBA1C 9.9 (H) 2019       Nutritional status:   Body mass index is 52.09 kg/m².  Lab Results   Component Value Date    ALBUMIN 4.3 2019    ALBUMIN 3.3 (L) 2019    ALBUMIN 3.6 2019     No results found for: PREALBUMIN    Estimated Creatinine Clearance: 47.7 mL/min (A) (based on SCr of 1.6 mg/dL (H)).    Accu-Checks  Recent Labs     19  1718 19  2202 19  0209 19  0809 19  1219 19  1715 19  2245 19  0157 19  0800 19  1224   POCTGLUCOSE 281* 184* 210* 327* 169* 141* 128* 178* 281* 168*       Current Medications and/or Treatments Impacting Glycemic Control  Immunotherapy:    Immunosuppressants     None        Steroids:   Hormones (From admission, onward)    None        Pressors:    Autonomic Drugs (From admission, onward)    None        Hyperglycemia/Diabetes Medications:   Antihyperglycemics (From admission, onward)    Start     Stop Route Frequency Ordered    19  insulin detemir U-100 pen 35 Units      -- SubQ 2 times daily 19 1604    " 05/25/19 0715  insulin aspart U-100 pen 20 Units      -- SubQ 3 times daily with meals 05/24/19 1756    05/24/19 1842  insulin aspart U-100 pen 1-10 Units      -- SubQ Before meals & nightly PRN 05/24/19 1748

## 2019-05-26 NOTE — PROGRESS NOTES
"[facility]  Endocrinology  Progress Note    Admit Date: 2019     Reason for Consult: Management of T2DM, Hyperglycemia     Surgical Procedure and Date: n/a    Diabetes diagnosis year:     Home Diabetes Medications:  U500 pens 150 (130) units with breakfast, 100 (90) units with lunch, and 75 (65) units with dinner - doses based on size of meal   victoza 1.2 mg daily   Lab Results   Component Value Date    HGBA1C 9.9 (H) 2019         How often checking glucose at home? 4-5 times a day   BG readings on regimen: 100-200s ( reading high night before coming into hospital)  Hypoglycemia on the regimen?  Yes about once every couple of weeks   Missed doses on regimen?  No    Diabetes Complications include:     Hyperglycemia, Hypoglycemia , Diabetic nephropathy  , Diabetic retinopathy  and Diabetic peripheral neuropathy     Complicating diabetes co morbidities:   CHF and CKD      HPI:   Patient is a 68 y.o. female with a diagnosis of poorly controlled type 2 DM on U500 insulin. Last seen in endocrine clinic by Ursula Salinas NP on 19. Also with RUFINA, CHF, CKD, HTN, and HLD. Insulin being titrated up outpatient over last month. BG reasonably well controlled at last visit. Patient endorses migraine, blurry vision, and increased urination. BG meter reading high night before presented to ED.             Interval HPI:   Overnight events: Remains on hem/onc floor. BG at or above goal with scheduled insulin and correction scale coverage. Tentative discharge today.   Eatin%  Nausea: No  Hypoglycemia and intervention: No  Fever: No  TPN and/or TF: No      /63 (BP Location: Right arm, Patient Position: Sitting)   Pulse 79   Temp 98.2 °F (36.8 °C) (Oral)   Resp 18   Ht 5' 5" (1.651 m)   Wt (!) 142 kg (313 lb 0.9 oz)   SpO2 100%   Breastfeeding? No   BMI 52.09 kg/m²      Labs Reviewed and Include    Recent Labs   Lab 19  0523   *   CALCIUM 10.0      K 4.3   CO2 " 24      BUN 21   CREATININE 1.6*     Lab Results   Component Value Date    WBC 5.73 05/26/2019    HGB 9.6 (L) 05/26/2019    HCT 29.1 (L) 05/26/2019    MCV 88 05/26/2019     05/26/2019     No results for input(s): TSH, FREET4 in the last 168 hours.  Lab Results   Component Value Date    HGBA1C 9.9 (H) 05/01/2019       Nutritional status:   Body mass index is 52.09 kg/m².  Lab Results   Component Value Date    ALBUMIN 4.3 05/23/2019    ALBUMIN 3.3 (L) 05/01/2019    ALBUMIN 3.6 04/09/2019     No results found for: PREALBUMIN    Estimated Creatinine Clearance: 47.7 mL/min (A) (based on SCr of 1.6 mg/dL (H)).    Accu-Checks  Recent Labs     05/24/19  1718 05/24/19  2202 05/25/19  0209 05/25/19  0809 05/25/19  1219 05/25/19  1715 05/25/19  2245 05/26/19  0157 05/26/19  0800 05/26/19  1224   POCTGLUCOSE 281* 184* 210* 327* 169* 141* 128* 178* 281* 168*       Current Medications and/or Treatments Impacting Glycemic Control  Immunotherapy:    Immunosuppressants     None        Steroids:   Hormones (From admission, onward)    None        Pressors:    Autonomic Drugs (From admission, onward)    None        Hyperglycemia/Diabetes Medications:   Antihyperglycemics (From admission, onward)    Start     Stop Route Frequency Ordered    05/25/19 2100  insulin detemir U-100 pen 35 Units      -- SubQ 2 times daily 05/25/19 1604    05/25/19 0715  insulin aspart U-100 pen 20 Units      -- SubQ 3 times daily with meals 05/24/19 1756    05/24/19 1842  insulin aspart U-100 pen 1-10 Units      -- SubQ Before meals & nightly PRN 05/24/19 1742          ASSESSMENT and PLAN    * Diabetic ketoacidosis without coma associated with type 2 diabetes mellitus  See Type 2 DM.     Type 2 diabetes mellitus with diabetic polyneuropathy, with long-term current use of insulin  BG goal 140-180 while inpatient.     Previous admission in 2007 for DKA and admission in 2011 for hypoglycemia.     Patient takes ~ 285-325 units TDD of insulin at home  (BG reasonably well controlled with increased doses over last month until prior to admission per BG logs at last visit). On U500 at home.     Will defer orders to primary team:   Recommend to -   continue Levemir 35  units BID  Continue Novolog 20 units with meals.   BG monitoring ac/hs/0200 and moderate dose correction scale given insulin resistance.     (initial considerations with insulin dosing: based on IV insulin infusion requirements 2.2 u/hr not enough insulin 2.8 u/hr too much basal; 2.5 u/hr = 60 units of basal insulin needed and prandial 20 units for basal/pradial matching; from home dosing ~285 units TDD for lower requirements. Given U500 decreased by 50% on admission and then split 50/50 basal prandial ~70 units basal and 23 units with meals; dosing also consistent with ~0.8 units/kg dosing).     Patient will likely need up titration of insulin given that she appears to be very insulin resistance.     Discharge plans- Patient to discharge today per primary team; will buy relion 70/30 (can afford 3 vials per primary and will work with PAP next week for assistance to obtain u500). relion 70/30 100 units with breakfast, 90 units with lunch, and 80 units with dinner. Patient has used vial and syringe previously. Will review. Patient verbalizes s/sx of hypoglycemia and treatment.     u500 pens 419.15; u500 vial 361.83 for monthly supply. Patient very insulin resistant and will likely need u500 insulin in order to control BG at home. Next alternative is relion 70/30 100 units with breakfast, 90 units with lunch, and 80 units with dinner;  - will need new vial every 3 days (vial ~ 25 dollar and will add to about 260 dollars a month and will likely have difficulty controlling BG off of u500; also with increased risk of hypoglycemia given patient will likely need TID dosing which increases risk of insulin stacking.).         Stage 4 chronic kidney disease  .Avoid insulin stacking and  hypoglycemia.      GEORGE (acute kidney injury)  Avoid insulin stacking and hypoglycemia.        Class 3 severe obesity in adult  May increase insulin resistance.   Body mass index is 52.09 kg/m².            Niurka Beckett NP  Endocrinology  [facility]

## 2019-05-26 NOTE — NURSING
Discharge instructions and prescriptions given and explained to pt.  Pt. verbalized understanding with no further questions.  Peripheral IVs D/C'd with catheter tips intact.  VS WDL.  Patient is awaiting ride home by daughter. Aspart and Detemir administered with patient's meal before discharge as ordered by MD. Prescriptions for insulin in pt's possession. Transport requested.     ROAD TEST  O=SpO2 96% on RA  A=Ambulating around room and hallway  D=IV D/C'd  T=Tolerating regular diet  E=Voids  S=Performs self care independently  T=Teaching on wounds care complete

## 2019-05-26 NOTE — PLAN OF CARE
CM received call from IM2 - inquiring if pt could d/c w/ approx 10 days worth of insulin paid by [facility] and f/u w/ PAP (patient assistance program) on Monday. CM relayed that hospital assistance not given for maintenance meds. CM f/u'ed w/ [facility] pharmacy is inquire about issue - pharmacy stated that pt is in the coverage gap (donut hole) for her part D plan. Pt's 2 insulin's and needles are $668.46. Pharmacy was not able to verify how much of gap has been met and how much left. PAP contacts are available M-F and are Sherri [phone] and Tonia [phone]. Pharmacy reports that they are expected to be in on Monday.     Update: CM noted d/c order in chart as well as an insulin change and the Rx was sent to [facility] pharmacy. CM contacted pharmacy - Novolin 70/30 is $46.37 and needles $25.00. Pharmacy tech stated that each vial contained 1000 units of insulin and pt's Rx is 100 units with breakfast, 90 units with lunch, and 80 units with dinner. CM will add PAP contact numbers to pt's f/u.    Update: CM noted h/h orders (not notified by team) - CM met w/ pt for choice/provided list and she chose [facility] as she believes she used them after hew knee replacement. CM forwarded h/h orders via RC to [facility].

## 2019-05-26 NOTE — NURSING
Pt informed me that she would not be able to get a ride home until 4pm and her home pharmacy would be closed, so she would not be able to get insulin with dinner. Informed MD and she ordered to hold patient until 5pm and administer insulin with her meal. Pt will  prescriptions in the morning. Home health to see patient tomorrow as well.

## 2019-05-26 NOTE — DISCHARGE SUMMARY
[facility] Hospital Medicine  Discharge Summary      Patient Name: Annika Mac  MRN: [id number]  Admission Date: 5/23/2019  Hospital Length of Stay: 3 days  Discharge Date and Time:  05/26/2019 12:22 PM  Attending Physician: Juana Hernandez MD   Discharging Provider: Ping Funk MD  Primary Care Provider: Mamie Cotton MD  [facility] Medicine Team: [facility] MED 2 Ping Funk MD    HPI:   Annika Mac is a  a 68 y.o female admitted to Hospital medicine for mild DKA in the setting of her not contorlling her diet. She has a past medical history of DM  ( on insulin and victoza ) , HTN, HLD, , CKD, HFpEF, stroke who presented to the ED with a complaint of hyperglycemia. She reported that on Tuesday she started having headaches and polyuria. The only thing different in her daily activities was she was eating two popsicles because she felt dehydrated on the day she had the headaches. She then noticed that every time she checked her sugar the glucometer could not read     Patient is taking victoza and SSI that has been steady regimen for her but she was told by her PCP that she is likely going to need some basal insulin. She has not missed any doses . She endorse nausea and inability to take home meds. She also has been having worsening Bilateral leg edema.  She denies fever, vomiting, dysuria, abdominal pain, or diarrhea.     Of note: hospitalized once for hypoglycemia never for hyperglycemia or DKA    In the ED found to have sugars > 500, bhydroxy elevated to 0.6, mildly acidotic, gap was closed, sugars fluctuating after given 4 L of NS and regular insulin. She was started on insulin drip       Hospital Course:   Admitted to [facility] for mild DKA.Started on IVF, insulin drip. Correct electrolytes. Monitor BMP q6 hrs. BG improved to around 250.  Endocrine consulted to help with medication management for DM. Pt was started on levemir 20u BID which then increased to 30 units BID and also aspart  20 TID. Given patient's insurance and the cost of U500  Insulin, per endocrine recs patient was discharged on alternative regimen as Novolin 70/30 and her victosa was stopped for the time being pending her insurance status or PAP assistance for resuming that. Patient was discharged with a prescription for 3 vials of insulin for 9 days, plan for follow with PAP for covering the rest of her treatment course. She will follow up with endocrine as outpatient. Also given patient's low BP, candesartan was held, patient will f/u with PCP for BP management.     Review of Systems   Constitutional: Negative for chills, diaphoresis, fever, malaise/fatigue and weight loss.   HENT: Negative for congestion and sinus pain.    Eyes: Negative for blurred vision, double vision, pain and redness.   Respiratory: Negative for cough and hemoptysis.    Cardiovascular: Negative for chest pain, palpitations and leg swelling.   Gastrointestinal: Negative for abdominal pain, constipation, nausea and vomiting.   Genitourinary: Negative for dysuria, frequency and urgency.   Musculoskeletal: Negative for back pain and joint pain.   Skin: Negative for rash.   Neurological: Negative for dizziness, tingling, tremors, sensory change, speech change, focal weakness, weakness and headaches.     Physical Exam   Constitutional: She is oriented to person, place, and time. No distress.   HENT:   Head: Normocephalic and atraumatic.   Eyes: Pupils are equal, round, and reactive to light. Conjunctivae and EOM are normal. Right eye exhibits no discharge. Left eye exhibits no discharge.   Neck: Normal range of motion. Neck supple. No JVD present.   Cardiovascular: Normal rate, regular rhythm and normal heart sounds.   Pulmonary/Chest: Effort normal and breath sounds normal. No respiratory distress. She has no wheezes. She has no rales.   Abdominal: Soft. Bowel sounds are normal. She exhibits no distension. There is no tenderness.   Musculoskeletal: Normal range  of motion.   Neurological: She is alert and oriented to person, place, and time. No cranial nerve deficit.   Skin: Skin is warm and dry. No rash noted. She is not diaphoretic. No erythema.     Consults:   Consults (From admission, onward)        Status Ordering Provider     Inpatient consult to Endocrinology  Once     Provider:  (Not yet assigned)    Completed ELSY GALDAMEZ     Inpatient consult to Registered Dietitian/Nutritionist  Once     Provider:  (Not yet assigned)    Completed JACI RIVERA          No new Assessment & Plan notes have been filed under this hospital service since the last note was generated.  Service: Hospital Medicine    Final Active Diagnoses:    Diagnosis Date Noted POA    PRINCIPAL PROBLEM:  Diabetic ketoacidosis without coma associated with type 2 diabetes mellitus [E11.10] 05/23/2019 Unknown    Class 3 severe obesity in adult [E66.01] 05/24/2019 Unknown    GEORGE (acute kidney injury) [N17.9] 05/23/2019 Unknown    Stage 4 chronic kidney disease [N18.4] 02/13/2019 Yes    GERD (gastroesophageal reflux disease) [K21.9] 01/27/2017 Yes    Essential hypertension [I10] 10/27/2016 Yes    Gout, chronic [M1A.9XX0] 09/24/2012 Yes    Type 2 diabetes mellitus with diabetic polyneuropathy, with long-term current use of insulin [E11.42, Z79.4] 07/27/2012 Not Applicable    History of stroke [Z86.73] 08/01/2003 Not Applicable      Problems Resolved During this Admission:       Discharged Condition: stable    Disposition: Home or Self Care    Follow Up:  Follow-up Information     Mamie Cotton MD In 2 days.    Specialty:  Internal Medicine  Why:  BP medication resume, candesartan held due to low blood pressure  Contact information:  [address]  [facility] [address]  [phone]             Pharmacy Assistance Program In 1 day.    Why:  Please call Pharmacy Assistance Program Monday to follow up on eligibility. p [phone] or [phone] or [phone]               Patient  "Instructions:      WALKER FOR HOME USE     Order Specific Question Answer Comments   Type of Walker: Rollator    With wheels? Yes    Height: 5' 5" (1.651 m)    Weight: 142 kg (313 lb 0.9 oz)    Length of need (1-99 months): 99    Does patient have medical equipment at home? walker, rolling    Does patient have medical equipment at home? rollator    Does patient have medical equipment at home? shower chair    Does patient have medical equipment at home? cane, straight    Does patient have medical equipment at home? wheelchair    Please check all that apply: Patient's condition impairs ambulation.    Please check all that apply: Patient is unable to safely ambulate without equipment.      BLOOD GLUCOSE MONITOR FOR HOME USE     Order Specific Question Answer Comments   Height: 5' 5" (1.651 m)    Weight: 142 kg (313 lb 0.9 oz)    Does patient have medical equipment at home? walker, rolling    Does patient have medical equipment at home? rollator    Does patient have medical equipment at home? shower chair    Does patient have medical equipment at home? cane, straight    Does patient have medical equipment at home? wheelchair    Length of need (1-99 months): 99        Ambulatory Referral to Endocrinology   Referral Priority: Routine Referral Type: Consultation   Requested Specialty: Endocrinology   Number of Visits Requested: 1         Significant Diagnostic Studies: Labs:   CMP   Recent Labs   Lab 05/24/19  1238 05/25/19  0332 05/26/19  0523    136 137   K 4.3 4.5 4.3    104 106   CO2 25 25 24   * 232* 222*   BUN 16 15 21   CREATININE 1.4 1.6* 1.6*   CALCIUM 9.9 9.9 10.0   ANIONGAP 7* 7* 7*   ESTGFRAFRICA 44.2* 37.6* 37.6*   EGFRNONAA 38.4* 32.6* 32.6*   , CBC   Recent Labs   Lab 05/25/19  0332 05/26/19  0523   WBC 6.27 5.73   HGB 9.8* 9.6*   HCT 30.3* 29.1*    206   , INR   Lab Results   Component Value Date    INR 1.0 03/10/2006    INR 1.0 03/08/2006    INR 1.1 02/22/2006   , Lipid Panel " "  Lab Results   Component Value Date    CHOL 153 03/26/2019    HDL 40 03/26/2019    LDLCALC 83.4 03/26/2019    TRIG 148 03/26/2019    CHOLHDL 26.1 03/26/2019   , Troponin No results for input(s): TROPONINI in the last 168 hours. and A1C:   Recent Labs   Lab 01/02/19  1035 05/01/19  0940   HGBA1C 9.3* 9.9*       Pending Diagnostic Studies:     None         Medications:  Reconciled Home Medications:      Medication List      START taking these medications    insulin NPH-insulin regular (70/30) 100 unit/mL (70-30) injection  Commonly known as:  NovoLIN 70/30 U-100 Insulin  100 units with breakfast, 90 units with lunch, and 80 units with dinner;         CHANGE how you take these medications    allopurinol 300 MG tablet  Commonly known as:  ZYLOPRIM  Take 1 tablet (300 mg total) by mouth once daily.  What changed:  Another medication with the same name was removed. Continue taking this medication, and follow the directions you see here.        CONTINUE taking these medications    amLODIPine 10 MG tablet  Commonly known as:  NORVASC  Take 1 tablet (10 mg total) by mouth once daily.     aspirin 81 MG Chew  Take 1 tablet (81 mg total) by mouth once daily.     BD ULTRA-FINE KIKA PEN NEEDLE 32 gauge x 5/32" Ndle  Generic drug:  pen needle, diabetic  To use 4 times daily     blood-glucose meter kit  Use as instructed, true test/result meter     colchicine 0.6 mg tablet  Commonly known as:  COLCRYS  Take 0.6 mg by mouth as needed.     DULoxetine 30 MG capsule  Commonly known as:  CYMBALTA  Take 1 capsule (30 mg total) by mouth once daily.     famotidine 20 MG tablet  Commonly known as:  PEPCID  Take 1 tablet (20 mg total) by mouth 2 (two) times daily.     fexofenadine 180 MG tablet  Commonly known as:  ALLEGRA  TAKE 1 TABLET BY MOUTH ONCE DAILY     finasteride 5 mg tablet  Commonly known as:  PROSCAR  TAKE 1 TABLET(5 MG) BY MOUTH EVERY DAY     fluocinonide 0.05 % external solution  Commonly known as:  LIDEX  AAA scalp " qday - bid prn pruritus     furosemide 20 MG tablet  Commonly known as:  LASIX  Take 1 tablet (20 mg total) by mouth once daily. Take an extra pill if short of breath or leg swelling     ketoconazole 2 % shampoo  Commonly known as:  NIZORAL  Wash hair with medicated shampoo at least 2x/week - let sit on scalp at least 5 minutes prior to rinsing     labetalol 300 MG tablet  Commonly known as:  NORMODYNE  Take 1 tablet (300 mg total) by mouth 2 (two) times daily.     lancets 31 gauge Misc  1 lancet by Misc.(Non-Drug; Combo Route) route 4 (four) times daily.     multivitamin per tablet  Commonly known as:  THERAGRAN  Take 1 tablet by mouth once daily.     pregabalin 100 MG capsule  Commonly known as:  LYRICA  TAKE 1 CAPSULE BY MOUTH TWO TIMES DAILY     simvastatin 40 MG tablet  Commonly known as:  ZOCOR  Take 0.5 tablets (20 mg total) by mouth every evening.     triamcinolone acetonide 0.1% 0.1 % ointment  Commonly known as:  KENALOG  AAA bid hands     TRUETEST TEST STRIPS Strp  Generic drug:  blood sugar diagnostic  1 strip by Misc.(Non-Drug; Combo Route) route 4 (four) times daily.        STOP taking these medications    candesartan 16 MG tablet  Commonly known as:  ATACAND     HYDROcodone-acetaminophen  mg per tablet  Commonly known as:  NORCO     insulin regular hum U-500 conc 500 unit/mL (3 mL) Inpn  Commonly known as:  HumuLIN R U-500 (Conc) Kwikpen     liraglutide 0.6 mg/0.1 mL (18 mg/3 mL) subq PNIJ 0.6 mg/0.1 mL (18 mg/3 mL) Pnij  Commonly known as:  VICTOZA 2-MILAN            Indwelling Lines/Drains at time of discharge:   Lines/Drains/Airways          None          Time spent on the discharge of patient: 60 minutes  Patient was seen and examined on the date of discharge and determined to be suitable for discharge.         Ping Funk MD  Department of Hospital Medicine  [facility]

## 2019-05-27 ENCOUNTER — TELEPHONE (OUTPATIENT)
Dept: ENDOCRINOLOGY | Facility: CLINIC | Age: 69
End: 2019-05-27

## 2019-05-27 ENCOUNTER — TELEPHONE (OUTPATIENT)
Dept: PHARMACY | Facility: CLINIC | Age: 69
End: 2019-05-27

## 2019-05-27 DIAGNOSIS — E11.42 TYPE 2 DIABETES MELLITUS WITH DIABETIC POLYNEUROPATHY: ICD-10-CM

## 2019-05-27 DIAGNOSIS — E11.42 TYPE 2 DIABETES MELLITUS WITH DIABETIC POLYNEUROPATHY, WITH LONG-TERM CURRENT USE OF INSULIN: Primary | ICD-10-CM

## 2019-05-27 DIAGNOSIS — Z79.4 TYPE 2 DIABETES MELLITUS WITH DIABETIC POLYNEUROPATHY, WITH LONG-TERM CURRENT USE OF INSULIN: Primary | ICD-10-CM

## 2019-05-27 RX ORDER — BIOTIN 1 MG
1 TABLET ORAL 4 TIMES DAILY
Qty: 400 STRIP | Refills: 4 | Status: SHIPPED | OUTPATIENT
Start: 2019-05-27 | End: 2019-10-03

## 2019-05-27 RX ORDER — INSULIN PUMP SYRINGE, 3 ML
EACH MISCELLANEOUS
Qty: 1 EACH | Refills: 0 | Status: SHIPPED | OUTPATIENT
Start: 2019-05-27 | End: 2021-05-17 | Stop reason: SDUPTHER

## 2019-05-27 RX ORDER — INSULIN PUMP SYRINGE, 3 ML
1 EACH MISCELLANEOUS DAILY
Qty: 1 EACH | Refills: 0 | Status: SHIPPED | OUTPATIENT
Start: 2019-05-27 | End: 2019-05-29 | Stop reason: SDUPTHER

## 2019-05-27 RX ORDER — INSULIN PUMP SYRINGE, 3 ML
EACH MISCELLANEOUS
COMMUNITY
End: 2019-05-27 | Stop reason: SDUPTHER

## 2019-05-27 RX ORDER — BIOTIN 1 MG
1 TABLET ORAL 4 TIMES DAILY
Qty: 400 STRIP | Refills: 4 | Status: SHIPPED | OUTPATIENT
Start: 2019-05-27 | End: 2019-05-27 | Stop reason: SDUPTHER

## 2019-05-27 NOTE — TELEPHONE ENCOUNTER
Called pt to get update on BG. Pt stated that her BG was this morning was 380 and pt dont have any insulin since yesterday 5 pm till now. Pt went yesterday to [facility] to get it but it was closed .  Notified pt that we  sent message to pt assistant pharmacy with high priority and april Car from pt assistant started working on pt file to get insulin. Notified provider .

## 2019-05-27 NOTE — TELEPHONE ENCOUNTER
Pt was in DKA last week and discharged form hospital yesterday. Due to issues of getting insulin U-500 and Victoza  from  Insurance because pt is in donut hole  pt discharged right now with 70/30 insulin from  [facility] . It will be expensive in future.  Can we get help  this pt ASAP to get her u-500 and Victoza  by pt assistant program.     Please let me know if you need any help from Endo dept.

## 2019-05-27 NOTE — TELEPHONE ENCOUNTER
----- Message from Jessi Paulino sent at 5/27/2019  9:09 AM CDT -----  Contact: Self [phone]   PT is requesting a BG meter and supplies.   PT is at the pharmacy. She lives 37 miles away from the pharmacy.     ..  [address] [address] [facility] [address] [address] [place] [id number]  [facility] [address]  Phone: [phone] Fax: [phone]

## 2019-05-27 NOTE — TELEPHONE ENCOUNTER
Tried to contact patient concerning referral for insulins.  Left message for patient to return my call.

## 2019-05-28 ENCOUNTER — TELEPHONE (OUTPATIENT)
Dept: INTERNAL MEDICINE | Facility: CLINIC | Age: 69
End: 2019-05-28

## 2019-05-28 ENCOUNTER — PATIENT OUTREACH (OUTPATIENT)
Dept: ADMINISTRATIVE | Facility: CLINIC | Age: 69
End: 2019-05-28

## 2019-05-28 ENCOUNTER — NURSE TRIAGE (OUTPATIENT)
Dept: ADMINISTRATIVE | Facility: CLINIC | Age: 69
End: 2019-05-28

## 2019-05-28 ENCOUNTER — PATIENT MESSAGE (OUTPATIENT)
Dept: ENDOCRINOLOGY | Facility: CLINIC | Age: 69
End: 2019-05-28

## 2019-05-28 LAB — BACTERIA BLD CULT: NORMAL

## 2019-05-28 NOTE — PATIENT INSTRUCTIONS
Diabetic Ketoacidosis  Diabetic ketoacidosis (DKA) is a serious problem that can happen in people with diabetes. DKA should be treated as a medical emergency. This is because it can lead to coma or death. If you have the symptoms of DKA, get medical help right away.  DKA happens more often in people with type 1 diabetes. But it can happen in people with type 2 diabetes. It can also happen in women with diabetes during pregnancy. This is often known as gestational diabetes.  DKA happens when insulin levels are too low. Without enough insulin, sugar (glucose) cant get to the cells of your body. The glucose stays in the blood. This causes high blood glucose (hyperglycemia). Without glucose, your body breaks down stored fat for energy. When this happens, acids called ketones are released into the blood. This is known as ketosis. High levels of ketones (ketoacidosis) can be harmful to you. Hyperglycemia and ketoacidosis can also cause serious problems in the blood and your body, such as:  · Low levels of potassium (hypokalemia)  · Swelling inside the brain (cerebral edema)  · Fluid in the lungs (pulmonary edema)  · Damage to kidneys or other organs  What causes diabetic ketoacidosis?  In people with diabetes, DKA is most often caused by too little insulin in the body. It is also caused by:  · Poor management of diabetes  · Infections like a urinary tract infection or pneumonia  · Serious health problems, such as a heart attack  · Reactions to certain prescribed medicines and illicit drugs  Symptoms of diabetic ketoacidosis  DKA most often happens slowly over time. But it can worsen in a few hours if you are vomiting. The first symptoms are:  · Thirst and dry mouth  · Urinating a lot  · Belly pain  · Nausea or vomiting  · Breath that smells fruity (from the ketones)  Over time, these symptoms may happen:  · Dry or flushed skin  · Nausea and vomiting  · Loss of appetite  · Weight loss  · Belly pain  · Trouble  breathing  · Trouble thinking or confusion  · Feeling very tired or weak. This can lead to coma.  How is diabetic ketoacidosis diagnosed?  Your healthcare provider will ask about your medical history. He or she will give you a physical exam. You may also have these tests:  · Blood tests to check your glucose levels  · Blood tests to check your electrolytes, such as potassium and sodium  · Urine test to check for ketones  These tests are done to check for DKA, and monitor it over time.  How is diabetic ketoacidosis treated?  DKA needs treatment right away in the hospital. Treatment includes:  · Insulin. This is the main type of treatment. Insulin allows the cells to use the glucose in the blood. This lowers the levels of both blood glucose and ketones.  · Fluids and electrolytes. These are given through a vein (IV). Fluids are replaced and abnormal electrolyte levels are corrected.  · Other medicines. These may be given to treat an illness that caused DKA. For example, antibiotics may be given to treat a urinary tract infection that caused DKA.  Preventing diabetic ketoacidosis  To help prevent DKA, make sure you:  · Take all of your medicines for diabetes exactly as prescribed. This includes insulin.  · Check your blood glucose levels exactly as instructed.  · Be especially careful when you are sick with an illness or an infection. Take extra care to follow diabetes care instructions. Check your blood glucose more often.  · Do not exercise when your blood sugar is high and you have ketones in your urine.   · Check your urine ketone levels if told to do so. This is done with a urine test strip. Ask your healthcare provider how often to check your urine.     When to call your healthcare provider  Call your healthcare provider right away if you:  · Have symptoms of DKA  · Have very high blood glucose levels  · Are getting sick with another illness     © 7117-9766 [employer]. [address] [facility],  [place] [address]. All rights reserved. This information is not intended as a substitute for professional medical care. Always follow your healthcare professional's instructions.

## 2019-05-28 NOTE — TELEPHONE ENCOUNTER
Hosp follow up booked 6-13, however the pt states that her blood sugars are low. Pt just took her BS and it was 74. Pt is going to eat now to help raise BS. Pt states that she is on a 2000 calorie diet. Pt states that she will NOT take any diabetic medication tonight

## 2019-05-28 NOTE — TELEPHONE ENCOUNTER
C3 nurse spoke with Annika Mac for a TCC post hospital discharge follow up call. The patient does not have a scheduled HOSFU appointment with Mamie Cotton MD within 7-14 days post hospital discharge date 5/26/2019. C3 nurse was unable to schedule HOSFU appointment in Epic.  Aleyda Viera, RN  [facility]

## 2019-05-29 ENCOUNTER — TELEPHONE (OUTPATIENT)
Dept: ENDOCRINOLOGY | Facility: CLINIC | Age: 69
End: 2019-05-29

## 2019-05-29 DIAGNOSIS — E11.42 TYPE 2 DIABETES MELLITUS WITH DIABETIC POLYNEUROPATHY: ICD-10-CM

## 2019-05-29 LAB
BACTERIA BLD CULT: NORMAL
BACTERIA BLD CULT: NORMAL

## 2019-05-29 RX ORDER — LANCETS
EACH MISCELLANEOUS
COMMUNITY
End: 2019-08-08 | Stop reason: SDUPTHER

## 2019-05-29 RX ORDER — INSULIN PUMP SYRINGE, 3 ML
1 EACH MISCELLANEOUS DAILY
Qty: 1 EACH | Refills: 0 | Status: SHIPPED | OUTPATIENT
Start: 2019-05-29 | End: 2023-04-27 | Stop reason: SDUPTHER

## 2019-05-29 NOTE — TELEPHONE ENCOUNTER
Called pt and confirmed appointment with pt for July and scheduled lab prior to see Ursula. Needs to call medicare to find out coverage for diabetic supply.

## 2019-05-29 NOTE — TELEPHONE ENCOUNTER
Called and spoke with pt  She had adjustment in her medication  And reports bs back to normal  She had isolated low and is following with her endocrinologist  As planned  Avoid meal skipping and watch for further lows  May need downward adjustmen if occurs again

## 2019-05-29 NOTE — TELEPHONE ENCOUNTER
----- Message from Laura Dominguez sent at 5/29/2019  8:17 AM CDT -----  Contact: self  Pt is returning a call she missed from the office this morning.    She can be reached at [phone].    Thank you

## 2019-05-29 NOTE — TELEPHONE ENCOUNTER
Patient called to report    -CBG 74 mg/ dl   -pt states she took her insulin   -CBG 86 mg/ dl at the time of call   -denies weakness, vomiting, rapid breathing  -advised to f/u with pcp in  The am     Reason for Disposition   Caller has NON-URGENT medication question about med that PCP prescribed and triager unable to answer question    Protocols used: DIABETES - LOW BLOOD SUGAR-A-AH

## 2019-05-29 NOTE — TELEPHONE ENCOUNTER
----- Message from Kasey Singh MA sent at 5/28/2019  4:40 PM CDT -----  Contact: Self [phone]      ----- Message -----  From: Jessi Paulino  Sent: 5/28/2019   4:00 PM  To: Rita Vergara Staff    PT called to schedule a hospital fu appt.

## 2019-05-30 ENCOUNTER — TELEPHONE (OUTPATIENT)
Dept: ENDOCRINOLOGY | Facility: CLINIC | Age: 69
End: 2019-05-30

## 2019-05-30 RX ORDER — LANCETS 33 GAUGE
EACH MISCELLANEOUS
COMMUNITY
End: 2019-05-30 | Stop reason: SDUPTHER

## 2019-05-30 RX ORDER — LANCETS 33 GAUGE
1 EACH MISCELLANEOUS 4 TIMES DAILY
Qty: 200 EACH | Refills: 12 | Status: SHIPPED | OUTPATIENT
Start: 2019-05-30 | End: 2021-05-17 | Stop reason: SDUPTHER

## 2019-05-30 NOTE — TELEPHONE ENCOUNTER
Called pt to get BG report since yesterday. Yesterday 215 - 8 am . 232-  1-pm,  79 - 6 pm,  84 - 9 pm , 57- 10 pm and this morning it was 209.     Pt ate something to pull her BG up yesterday night and went up up to 110.     Does pt need any adjustment?    Please advise.

## 2019-05-30 NOTE — TELEPHONE ENCOUNTER
----- Message from Laura Dominguez sent at 5/30/2019  8:22 AM CDT -----  Contact: self  Pt is calling in regards to her blood sugar levels.     Pt would like a call back at [phone].    Thank you

## 2019-05-31 ENCOUNTER — TELEPHONE (OUTPATIENT)
Dept: ENDOCRINOLOGY | Facility: CLINIC | Age: 69
End: 2019-05-31

## 2019-05-31 NOTE — TELEPHONE ENCOUNTER
Called pt and advise to increase increase to 110 with breakfast, 100, with lunch, & 90 with dinner.    Pt verbalized understand.

## 2019-05-31 NOTE — TELEPHONE ENCOUNTER
----- Message from William Mac sent at 5/31/2019  1:13 PM CDT -----  Contact: Pt  Pt called to inform Ursula Salinas that her blood sugar level was 298@8am and 431@1:13pm    Pt can be reached at [phone].    Thanks

## 2019-05-31 NOTE — TELEPHONE ENCOUNTER
Pt is taking Novolin mix 70/30 and 100 units with B/f and 90units with lunch and 270 with dinner.

## 2019-05-31 NOTE — TELEPHONE ENCOUNTER
Spoke with pt and her BG run high this morning 298 and 431. No change with regimen. Please advise.

## 2019-06-01 ENCOUNTER — NURSE TRIAGE (OUTPATIENT)
Dept: ADMINISTRATIVE | Facility: CLINIC | Age: 69
End: 2019-06-01

## 2019-06-04 ENCOUNTER — HOSPITAL ENCOUNTER (EMERGENCY)
Facility: HOSPITAL | Age: 69
Discharge: HOME | End: 2019-06-04
Attending: EMERGENCY MEDICINE
Payer: MEDICARE

## 2019-06-04 ENCOUNTER — TELEPHONE (OUTPATIENT)
Dept: PODIATRY | Facility: CLINIC | Age: 69
End: 2019-06-04

## 2019-06-04 VITALS
SYSTOLIC BLOOD PRESSURE: 135 MMHG | DIASTOLIC BLOOD PRESSURE: 71 MMHG | HEIGHT: 65 IN | BODY MASS INDEX: 48.48 KG/M2 | RESPIRATION RATE: 15 BRPM | OXYGEN SATURATION: 99 % | WEIGHT: 291 LBS | TEMPERATURE: 98 F | HEART RATE: 84 BPM

## 2019-06-04 DIAGNOSIS — M54.40 CHRONIC LOW BACK PAIN WITH SCIATICA, SCIATICA LATERALITY UNSPECIFIED, UNSPECIFIED BACK PAIN LATERALITY: ICD-10-CM

## 2019-06-04 DIAGNOSIS — S70.01XA CONTUSION OF RIGHT HIP, INITIAL ENCOUNTER: ICD-10-CM

## 2019-06-04 DIAGNOSIS — S63.501A RIGHT WRIST SPRAIN, INITIAL ENCOUNTER: ICD-10-CM

## 2019-06-04 DIAGNOSIS — M47.816 LUMBAR FACET ARTHROPATHY: ICD-10-CM

## 2019-06-04 DIAGNOSIS — M25.551 RIGHT HIP PAIN: ICD-10-CM

## 2019-06-04 DIAGNOSIS — M25.561 ACUTE PAIN OF RIGHT KNEE: ICD-10-CM

## 2019-06-04 DIAGNOSIS — H26.493 PCO (POSTERIOR CAPSULAR OPACIFICATION), BILATERAL: ICD-10-CM

## 2019-06-04 DIAGNOSIS — M48.061 SPINAL STENOSIS OF LUMBAR REGION, UNSPECIFIED WHETHER NEUROGENIC CLAUDICATION PRESENT: ICD-10-CM

## 2019-06-04 DIAGNOSIS — G89.29 CHRONIC LOW BACK PAIN WITH SCIATICA, SCIATICA LATERALITY UNSPECIFIED, UNSPECIFIED BACK PAIN LATERALITY: ICD-10-CM
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
## 2019-06-04 DIAGNOSIS — S63.502A SPRAIN OF LEFT WRIST, INITIAL ENCOUNTER: ICD-10-CM

## 2019-06-04 DIAGNOSIS — E11.43 DIABETIC AUTONOMIC NEUROPATHY ASSOCIATED WITH TYPE 2 DIABETES MELLITUS: ICD-10-CM

## 2019-06-04 DIAGNOSIS — G63 POLYNEUROPATHY ASSOCIATED WITH UNDERLYING DISEASE: ICD-10-CM

## 2019-06-04 DIAGNOSIS — M17.11 PRIMARY OSTEOARTHRITIS OF RIGHT KNEE: ICD-10-CM

## 2019-06-04 PROCEDURE — 29125 APPL SHORT ARM SPLINT STATIC: CPT

## 2019-06-04 PROCEDURE — 25000003 PHARM REV CODE 250: Performed by: EMERGENCY MEDICINE

## 2019-06-04 PROCEDURE — 99284 EMERGENCY DEPT VISIT MOD MDM: CPT | Mod: ,,, | Performed by: EMERGENCY MEDICINE

## 2019-06-04 PROCEDURE — 99284 EMERGENCY DEPT VISIT MOD MDM: CPT | Mod: 25

## 2019-06-04 PROCEDURE — 99284 PR EMERGENCY DEPT VISIT,LEVEL IV: ICD-10-PCS | Mod: ,,, | Performed by: EMERGENCY MEDICINE

## 2019-06-04 RX ORDER — HYDROCODONE BITARTRATE AND ACETAMINOPHEN 5; 325 MG/1; MG/1
1 TABLET ORAL
Status: COMPLETED | OUTPATIENT
Start: 2019-06-04 | End: 2019-06-04

## 2019-06-04 RX ORDER — TRAMADOL HYDROCHLORIDE 50 MG/1
50 TABLET ORAL EVERY 6 HOURS PRN
Qty: 21 TABLET | Refills: 0 | Status: SHIPPED | OUTPATIENT
Start: 2019-06-04 | End: 2019-06-09

## 2019-06-04 RX ORDER — DULOXETIN HYDROCHLORIDE 30 MG/1
30 CAPSULE, DELAYED RELEASE ORAL DAILY
Qty: 90 CAPSULE | Refills: 3 | Status: SHIPPED | OUTPATIENT
Start: 2019-06-04 | End: 2019-09-04 | Stop reason: SDUPTHER

## 2019-06-04 RX ADMIN — HYDROCODONE BITARTRATE AND ACETAMINOPHEN 1 TABLET: 5; 325 TABLET ORAL at 10:06

## 2019-06-04 NOTE — ED PROVIDER NOTES
Encounter Date: 6/4/2019    SCRIBE #1 NOTE: I, Son Vikki, am scribing for, and in the presence of,  Dr. Larose. I have scribed the entire note.       History     Chief Complaint   Patient presents with    Fall     Pt brought to ED by security after having a fall in the clinic.  Pt c/o right hip pain and bilateral wrist pain.     68 y.o. female with history of CHF, HTN, HLD presenting s/p mechanical fall with bilateral wrist pain and right hip pain. Patient ambulates using a roller walker. As the patient was walking to clinic earlier today, her roller walker had tripped over something on the ground causing it to tip over. She states that she fell onto her right side. She denies any head trauma because she states that she had braced herself by using her bilateral hands. Currently, she reports of bilateral wrist pain that worsens when she extended her fingers. She denies any numbness or tingling sensation to her hands. He also reports of right hip pain. She has not taken anything for the pain. Denies new dental problems. No shortness of breath.     The history is provided by the patient and medical records.     Review of patient's allergies indicates:   Allergen Reactions    Iodinated contrast- oral and iv dye Rash     Past Medical History:   Diagnosis Date    Allergy     Anemia 7/27/2012    Arthritis     CHF (congestive heart failure)     CKD (chronic kidney disease) stage 3, GFR 30-59 ml/min 7/27/2012    Clotting disorder     Colon polyp     Deep vein thrombophlebitis of left leg 7/27/2012    Degenerative disc disease     Diabetic peripheral neuropathy associated with type 2 diabetes mellitus 7/27/2012    GERD (gastroesophageal reflux disease)     HTN (hypertension) 7/27/2012    Hyperlipidemia 7/27/2012    Lead-induced gout of ankle or foot     right going on three weeks    Nonproliferative diabetic retinopathy of right eye 7/27/2012    Obstructive sleep apnea 7/27/2012    Osteoporosis      Proliferative diabetic retinopathy of left eye 2012    Stroke 2003    Type 2 diabetes mellitus with diabetic polyneuropathy 2012    Ulcer      Past Surgical History:   Procedure Laterality Date    CATARACT EXTRACTION W/  INTRAOCULAR LENS IMPLANT Left 3/2002    left     CATARACT EXTRACTION W/  INTRAOCULAR LENS IMPLANT Right     OD dr. olivares     SECTION      COLONOSCOPY N/A 2018    [facility]formed by [facility] at [facility] (2ND FLR)    CYST REMOVAL  2011    left side of face    ESOPHAGOGASTRODUODENOSCOPY (EGD) N/A 2018    Performed by [facility] at [facility] (2ND FLR)    EYE SURGERY Bilateral 2012    eye implants    GANGLION CYST EXCISION  2007    Right wrist    INSERTION, IOL PROSTHESIS Right 2012    Performed by Linda Olivares [facility] at [facility] OR 1ST FLR    Pan Retinal Photocoagulation Bilateral     Dr. Monterroso (Proliferative Diabetic Retinopathy)    PHACOEMULSIFICATION, CATARACT Right 2012    Performed by [facility] at [facility] OR 1ST FLR    TOTAL ABDOMINAL HYSTERECTOMY  1982    TOTAL KNEE ARTHROPLASTY  2006    left    TRIGGER FINGER RELEASE  2009     Family History   Problem Relation Age of Onset    Diabetes Mother     Hypertension Mother     Heart disease Father     Diabetes Sister     Thyroid disease Brother     Diabetes Brother     Hypertension Brother     No Known Problems Daughter     No Known Problems Son     No Known Problems Daughter     Amblyopia Neg Hx     Blindness Neg Hx     Glaucoma Neg Hx     Macular degeneration Neg Hx     Retinal detachment Neg Hx     Strabismus Neg Hx     Colon cancer Neg Hx     Esophageal cancer Neg Hx      Social History     Tobacco Use    Smoking status: Never Smoker    Smokeless tobacco: Never Used   Substance Use Topics    Alcohol use: No    Drug use: No     Review of Systems   Constitutional: Negative for chills and fever.   HENT: Negative  for congestion.    Eyes: Negative for visual disturbance.   Respiratory: Negative for shortness of breath.    Cardiovascular: Negative for chest pain.   Gastrointestinal: Negative for diarrhea, nausea and vomiting.   Genitourinary: Negative for dysuria.   Musculoskeletal:        + bilateral wrist pain  + right hip pain   Skin: Negative for rash.   Neurological: Negative for headaches.       Physical Exam     Initial Vitals [06/04/19 0958]   BP Pulse Resp Temp SpO2   133/63 79 16 98.3 °F (36.8 °C) 99 %      MAP       --         Physical Exam    Nursing note and vitals reviewed.  Constitutional: She appears well-developed and well-nourished. She is not diaphoretic. She is Obese .   68 y.o.  woman in mild discomfort noted.    HENT:   Head: Normocephalic and atraumatic.   Mouth/Throat: Oropharynx is clear and moist.   Dentition intact with no intraoral injury    Eyes: EOM are normal. Pupils are equal, round, and reactive to light.   Cardiovascular: Normal rate, regular rhythm, normal heart sounds and intact distal pulses. Exam reveals no gallop and no friction rub.    No murmur heard.  Pulmonary/Chest: Breath sounds normal. No respiratory distress. She has no wheezes. She has no rhonchi. She has no rales.   Abdominal: Soft. She exhibits no distension. There is no tenderness.   Abdomen obese    Musculoskeletal: Normal range of motion. She exhibits tenderness. She exhibits no edema.   Pailful ROM bilateral wrist with mild bilateral snuff box tenderness without associate deformity or swelling.  Mild to moderate tenderness upon palpation of right hip without deformity  No peripheral edema      Neurological: She is alert and oriented to person, place, and time. She has normal strength.   Psychiatric: She has a normal mood and affect. Her behavior is normal. Thought content normal.         ED Course   Procedures  Labs Reviewed - No data to display       Imaging Results          X-Ray Wrist Complete Bilateral  (Final result)  Result time 06/04/19 11:09:21    Final result by Jason Vincent MD (06/04/19 11:09:21)                 Impression:      See above      Electronically signed by: Jason Vincent MD  Date:    06/04/2019  Time:    11:09             Narrative:    EXAMINATION:  XR WRIST COMPLETE 3 VIEWS BILATERAL    CLINICAL HISTORY:  fall from standing;    TECHNIQUE:  PA, lateral, and oblique views of both wrists were performed.    COMPARISON:  None    FINDINGS:  Mild DJD.  No fracture or dislocation.  No bone destruction identified                               X-Ray Hip 2 View Right (Final result)  Result time 06/04/19 11:08:54    Final result by Jason Vincent MD (06/04/19 11:08:54)                 Impression:      See above      Electronically signed by: Jason Vincent MD  Date:    06/04/2019  Time:    11:08             Narrative:    EXAMINATION:  XR HIP 2 VIEW RIGHT    CLINICAL HISTORY:  Pain in right hip    TECHNIQUE:  AP view of the pelvis and frog leg lateral view of the right hip were performed.    COMPARISON:  None    FINDINGS:  No fracture or dislocation.  No bone destruction identified mild DJD.                              X-Rays:   Independently Interpreted Readings:   Other Readings:  Right hip: degenerative changes noted bilaterally without evidence of acute bony injury.  Bilateral wrist: no fracture or dislocation noted.     Medical Decision Making:   History:   Old Medical Records: I decided to obtain old medical records.  Differential Diagnosis:   Right hip fracture, right hip contusion, fall from standing, bilateral wrist fracture, bilateral wrist sprain   Independently Interpreted Test(s):   I have ordered and independently interpreted X-rays - see prior notes.  Clinical Tests:   Radiological Study: Reviewed and Ordered            Scribe Attestation:   Scribe #1: I performed the above scribed service and the documentation accurately describes the services I performed. I attest to the accuracy of the  note.    Attending Attestation:             Attending ED Notes:   No evidence of fracture identified on plain films obtained today.  Patient describes improvement of her pain with ED therapy.  She has been fitted for bilateral wrist splints due to suspected potential underlying scaphoid injury. Outpatient analgesia to be managed with tramadol 50 mg p.r.n..  I have also referred her to Orthopedics for follow-up of her bilateral scaphoid tenderness and ED return precautions as needed.             Clinical Impression:       ICD-10-CM ICD-9-CM   1. Fall from standing, initial encounter W19.XXXA E888.9   2. Right hip pain M25.551 719.45   3. Sprain of left wrist, initial encounter S63.502A 842.00   4. Right wrist sprain, initial encounter S63.501A 842.00   5. Contusion of right hip, initial encounter S70.01XA 924.01         Disposition:   Disposition: Discharged                        Kimo Larose MD  06/04/19 7514

## 2019-06-04 NOTE — ED NOTES
Patient identifiers verified and correct for Annika Mac  LOC: The patient is awake, alert and aware of environment with an appropriate affect, the patient is oriented x 3 and speaking appropriately.   APPEARANCE: Patient appears comfortable and in no acute distress, patient is clean and well groomed.  SKIN: The skin is warm and dry, color consistent with ethnicity, patient has normal skin turgor and moist mucus membranes, skin intact, no breakdown or bruising noted.   MUSCULOSKELETAL: Patient moving all extremities spontaneously, no swelling noted. Reports of R side pain and bilateral wrist pain  RESPIRATORY: Airway is open and patent, respirations are spontaneous, patient has a normal effort and rate, no accessory muscle use noted, O2 sats noted at 99% on room air.  CARDIAC: Denies chest pain capillary refill < 3 seconds.   GASTRO: Soft and non tender to palpation, no distention noted, normoactive bowel sounds present in all four quadrants. Pt states bowel movements have been regular.  : Pt denies any pain or frequency with urination.  NEURO: Pt opens eyes spontaneously, behavior appropriate to situation, follows commands, facial expression symmetrical, bilateral hand grasp equal and even, purposeful motor response noted, normal sensation in all extremities when touched with a finger.

## 2019-06-04 NOTE — TELEPHONE ENCOUNTER
----- Message from Allyson Bravo sent at 6/4/2019  3:52 PM CDT -----  Contact: Self/ [phone]  Patient Requesting Sooner Appointment.     Reason for sooner appt.: Patient would like a call back to come in sooner for her appt. Baron could not come in due to being in the hospital.   When is the first available appointment? 7/22/19.   Communication Preference: [phone].

## 2019-06-04 NOTE — ED TRIAGE NOTES
Patient reports to the ED today with reports of fall today. Patient states that she was sitting on her walker and couldn't get across a seem in a doorway and fell out of her walker. Patient reports landing on her side and catching her self with her wrist and endorses bilateral wrist pain at this time.

## 2019-06-05 ENCOUNTER — TELEPHONE (OUTPATIENT)
Dept: ENDOCRINOLOGY | Facility: CLINIC | Age: 69
End: 2019-06-05

## 2019-06-05 NOTE — TELEPHONE ENCOUNTER
Spoke with pt and advise to increase increase to 120 with breakfast, 110, with lunch, & 90 with dinner. Pt verbalized understand.

## 2019-06-05 NOTE — TELEPHONE ENCOUNTER
----- Message from Shawanda Cisneros sent at 6/5/2019 11:28 AM CDT -----  Contact: self [phone]  .Needs Advice    Reason for call:        Communication Preference:phone  Additional Information:pt states the insulin that she was sent home on states she would like to discuss please call back to discuss

## 2019-06-05 NOTE — TELEPHONE ENCOUNTER
----- Message from Carole Lopez sent at 6/5/2019  1:01 PM CDT -----  Contact:   Patient  [phone]  Needs Advice    Reason for call:        Communication Preference:  Phone      Additional Information:  Pt returning the nurse phone call

## 2019-06-05 NOTE — TELEPHONE ENCOUNTER
Spoke with pt and pt was saying she will be runing out soon her last vial what she was discharged from hospital. Pt BG  no was yesterday in the morning 167 , 271 1 pm, 260 6 pm and 180 at night.   Today morning 8 am 175 and lunch time 245..  Does pt needs to decrease her dose? Because runs out her insulin fast and she still waiting response from  Pt Assistant pharmacy . .    Please advise if pt needs adjustment.

## 2019-06-07 ENCOUNTER — TELEPHONE (OUTPATIENT)
Dept: PHYSICAL MEDICINE AND REHAB | Facility: CLINIC | Age: 69
End: 2019-06-07

## 2019-06-07 ENCOUNTER — TELEPHONE (OUTPATIENT)
Dept: ENDOCRINOLOGY | Facility: CLINIC | Age: 69
End: 2019-06-07

## 2019-06-07 DIAGNOSIS — E11.42 TYPE 2 DIABETES MELLITUS WITH DIABETIC POLYNEUROPATHY, WITH LONG-TERM CURRENT USE OF INSULIN: ICD-10-CM

## 2019-06-07 DIAGNOSIS — Z79.4 TYPE 2 DIABETES MELLITUS WITH DIABETIC POLYNEUROPATHY, WITH LONG-TERM CURRENT USE OF INSULIN: ICD-10-CM

## 2019-06-07 NOTE — TELEPHONE ENCOUNTER
----- Message from Jessi Paulino sent at 6/7/2019  3:30 PM CDT -----  Contact: Self/ [phone]  PT is calling stating that the meds insulin NPH-insulin regular, 70/30, (NOVOLIN 70/30) 100 unit/mL (70-30) injection is in need of a Auth. She would like to speak with someone in regards to this.     ..      [facility] [address] [place] [id number] [facility] [address] [address] [facility] [address]  [facility] [address]  Phone: [phone] Fax: [phone]    NEGRA Rocha

## 2019-06-07 NOTE — TELEPHONE ENCOUNTER
----- Message from Jessi Paulino sent at 6/7/2019  8:19 AM CDT -----  Contact: Self [phone]  PT called to provide BG numbers:   6/6 at 1PM 188  6/6 at 7PM 308  6/6 at 10   6/7 at 819     She is also in the donut hole and has not been able to get her Humulin and Victoza. The insurance company sent PA papers over yesterday.

## 2019-06-07 NOTE — TELEPHONE ENCOUNTER
Spoke with pt and advise that pt is not eligible for [facility] pt assistant program and needs to stay on [facility] 70/30 . Need Rx to see if its cover by insurance because pt cant effort every time buy from [facility]. Pt gave information to call insurance company to do PA for Victoza and appeal for U-500 ..  Will call insurance company today to intiate PA. Please reviewed BG no for pt.     Thank you.

## 2019-06-07 NOTE — TELEPHONE ENCOUNTER
Returned call.  Noone was able to tell me why I received a second call, they had no further questions.    ----- Message from Adryan Bundy sent at 6/7/2019 10:06 AM CDT -----  Contact: [employer] @ [phone]   Caller requesting a return call about additional information needed for the prior authorization for (pregabalin (LYRICA) 100 MG capsule ) pls call

## 2019-06-10 ENCOUNTER — TELEPHONE (OUTPATIENT)
Dept: ENDOCRINOLOGY | Facility: CLINIC | Age: 69
End: 2019-06-10

## 2019-06-10 NOTE — TELEPHONE ENCOUNTER
Called insurance company to initiate PA for Novolin 70/30. insurance wont cover because of non-formulary but Humulin 70/30 will be cover. If you will be ok please sent in new Rx for pt.    Please advise.

## 2019-06-11 ENCOUNTER — TELEPHONE (OUTPATIENT)
Dept: ENDOCRINOLOGY | Facility: CLINIC | Age: 69
End: 2019-06-11

## 2019-06-11 NOTE — TELEPHONE ENCOUNTER
Received PA approval for Victoza form insurance company . Case ID no [id number].     Please advise.

## 2019-06-11 NOTE — TELEPHONE ENCOUNTER
Called [facility] pharmacy to verified a direction and quantity for Victoza and verified Rx for Humulin 70/30 .

## 2019-06-11 NOTE — TELEPHONE ENCOUNTER
----- Message from Jessi Paulino sent at 6/11/2019  9:51 AM CDT -----  Contact: [facility] [phone]  .Medication question / problems.  Pharmacy called to verify quantity for liraglutide 0.6 mg/0.1 mL, 18 mg/3 mL, subq PNIJ (VICTOZA 3-MILAN) 0.6 mg/0.1 mL (18 mg/3 mL) PnIj.    ..  [facility] [address] [place] [address] [facility] [id number] [place] [address]  [facility] [address]  Phone: [phone] Fax: [phone]

## 2019-06-13 ENCOUNTER — OFFICE VISIT (OUTPATIENT)
Dept: PODIATRY | Facility: CLINIC | Age: 69
End: 2019-06-13
Payer: MEDICARE

## 2019-06-13 ENCOUNTER — OFFICE VISIT (OUTPATIENT)
Dept: INTERNAL MEDICINE | Facility: CLINIC | Age: 69
End: 2019-06-13
Payer: MEDICARE

## 2019-06-13 ENCOUNTER — TELEPHONE (OUTPATIENT)
Dept: ENDOCRINOLOGY | Facility: CLINIC | Age: 69
End: 2019-06-13

## 2019-06-13 VITALS
BODY MASS INDEX: 48.82 KG/M2 | HEIGHT: 65 IN | SYSTOLIC BLOOD PRESSURE: 124 MMHG | HEART RATE: 82 BPM | WEIGHT: 293 LBS | DIASTOLIC BLOOD PRESSURE: 62 MMHG

## 2019-06-13 VITALS
DIASTOLIC BLOOD PRESSURE: 66 MMHG | WEIGHT: 291 LBS | SYSTOLIC BLOOD PRESSURE: 131 MMHG | HEIGHT: 65 IN | BODY MASS INDEX: 48.48 KG/M2 | HEART RATE: 74 BPM

## 2019-06-13 DIAGNOSIS — I12.9 TYPE 2 DM WITH CKD STAGE 4 AND HYPERTENSION: ICD-10-CM
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
## 2019-06-13 DIAGNOSIS — E11.22 TYPE 2 DM WITH CKD STAGE 4 AND HYPERTENSION: ICD-10-CM

## 2019-06-13 DIAGNOSIS — E11.49 TYPE II DIABETES MELLITUS WITH NEUROLOGICAL MANIFESTATIONS: Primary | ICD-10-CM

## 2019-06-13 DIAGNOSIS — M25.539 PAIN IN WRIST, UNSPECIFIED LATERALITY: ICD-10-CM

## 2019-06-13 DIAGNOSIS — L84 CORN OR CALLUS: ICD-10-CM

## 2019-06-13 DIAGNOSIS — E78.5 HYPERLIPIDEMIA, UNSPECIFIED HYPERLIPIDEMIA TYPE: ICD-10-CM

## 2019-06-13 DIAGNOSIS — N18.4 TYPE 2 DM WITH CKD STAGE 4 AND HYPERTENSION: ICD-10-CM

## 2019-06-13 DIAGNOSIS — E78.2 MIXED HYPERLIPIDEMIA: Primary | ICD-10-CM

## 2019-06-13 DIAGNOSIS — B35.1 ONYCHOMYCOSIS DUE TO DERMATOPHYTE: ICD-10-CM

## 2019-06-13 DIAGNOSIS — I10 ESSENTIAL HYPERTENSION: ICD-10-CM

## 2019-06-13 DIAGNOSIS — E66.01 MORBID OBESITY WITH BMI OF 45.0-49.9, ADULT: ICD-10-CM

## 2019-06-13 PROCEDURE — 99213 PR OFFICE/OUTPT VISIT, EST, LEVL III, 20-29 MIN: ICD-10-PCS | Mod: 25,S$PBB,, | Performed by: PODIATRIST

## 2019-06-13 PROCEDURE — 11056 PARNG/CUTG B9 HYPRKR LES 2-4: CPT | Mod: S$PBB,Q9,, | Performed by: PODIATRIST

## 2019-06-13 PROCEDURE — 99213 OFFICE O/P EST LOW 20 MIN: CPT | Mod: PBBFAC,27 | Performed by: INTERNAL MEDICINE

## 2019-06-13 PROCEDURE — 99999 PR PBB SHADOW E&M-EST. PATIENT-LVL III: ICD-10-PCS | Mod: PBBFAC,,, | Performed by: INTERNAL MEDICINE

## 2019-06-13 PROCEDURE — 11721 DEBRIDE NAIL 6 OR MORE: CPT | Mod: PBBFAC | Performed by: PODIATRIST

## 2019-06-13 PROCEDURE — 11056 PR TRIM BENIGN HYPERKERATOTIC SKIN LESION,2-4: ICD-10-PCS | Mod: S$PBB,Q9,, | Performed by: PODIATRIST

## 2019-06-13 PROCEDURE — 99213 OFFICE O/P EST LOW 20 MIN: CPT | Mod: 25,S$PBB,, | Performed by: PODIATRIST

## 2019-06-13 PROCEDURE — 99999 PR PBB SHADOW E&M-EST. PATIENT-LVL III: ICD-10-PCS | Mod: PBBFAC,,, | Performed by: PODIATRIST

## 2019-06-13 PROCEDURE — 99495 TRANSJ CARE MGMT MOD F2F 14D: CPT | Mod: PBBFAC | Performed by: INTERNAL MEDICINE

## 2019-06-13 PROCEDURE — 11721 DEBRIDE NAIL 6 OR MORE: CPT | Mod: S$PBB,59,Q9, | Performed by: PODIATRIST

## 2019-06-13 PROCEDURE — 99213 OFFICE O/P EST LOW 20 MIN: CPT | Mod: PBBFAC | Performed by: PODIATRIST

## 2019-06-13 PROCEDURE — 99495 TRANSJ CARE MGMT MOD F2F 14D: CPT | Mod: S$PBB,,, | Performed by: INTERNAL MEDICINE

## 2019-06-13 PROCEDURE — 99999 PR PBB SHADOW E&M-EST. PATIENT-LVL III: CPT | Mod: PBBFAC,,, | Performed by: INTERNAL MEDICINE

## 2019-06-13 PROCEDURE — 11721 PR DEBRIDEMENT OF NAILS, 6 OR MORE: ICD-10-PCS | Mod: S$PBB,59,Q9, | Performed by: PODIATRIST

## 2019-06-13 PROCEDURE — 11056 PARNG/CUTG B9 HYPRKR LES 2-4: CPT | Mod: PBBFAC | Performed by: PODIATRIST

## 2019-06-13 PROCEDURE — 99999 PR PBB SHADOW E&M-EST. PATIENT-LVL III: CPT | Mod: PBBFAC,,, | Performed by: PODIATRIST

## 2019-06-13 PROCEDURE — 99495 TCM SERVICES (MODERATE COMPLEXITY): ICD-10-PCS | Mod: S$PBB,,, | Performed by: INTERNAL MEDICINE

## 2019-06-13 RX ORDER — AMLODIPINE BESYLATE 10 MG/1
10 TABLET ORAL DAILY
Qty: 90 TABLET | Refills: 3 | Status: SHIPPED | OUTPATIENT
Start: 2019-06-13 | End: 2020-01-06 | Stop reason: SDUPTHER

## 2019-06-13 RX ORDER — PREGABALIN 150 MG/1
150 CAPSULE ORAL 2 TIMES DAILY
Qty: 60 CAPSULE | Refills: 5 | Status: SHIPPED | OUTPATIENT
Start: 2019-06-13 | End: 2019-12-13

## 2019-06-13 RX ORDER — SIMVASTATIN 40 MG/1
20 TABLET, FILM COATED ORAL NIGHTLY
Qty: 90 TABLET | Refills: 4 | Status: SHIPPED | OUTPATIENT
Start: 2019-06-13 | End: 2019-09-04 | Stop reason: SDUPTHER

## 2019-06-13 NOTE — PROGRESS NOTES
Subjective:      Patient ID: Annika Mac is a 68 y.o. female.    Chief Complaint: Diabetic Foot Exam (bilateral pcp Dr Cotton 3/13/19); Diabetes Mellitus; and Nail Care    Annika is a 68 y.o. female who presents to the clinic for evaluation and treatment of high risk feet. Annika has a past medical history of Allergy, Anemia (7/27/2012), Arthritis, CHF (congestive heart failure), CKD (chronic kidney disease) stage 3, GFR 30-59 ml/min (7/27/2012), Clotting disorder, Colon polyp, Deep vein thrombophlebitis of left leg (7/27/2012), Degenerative disc disease, Diabetic peripheral neuropathy associated with type 2 diabetes mellitus (7/27/2012), GERD (gastroesophageal reflux disease), HTN (hypertension) (7/27/2012), Hyperlipidemia (7/27/2012), Lead-induced gout of ankle or foot, Nonproliferative diabetic retinopathy of right eye (7/27/2012), Obstructive sleep apnea (7/27/2012), Osteoporosis, Proliferative diabetic retinopathy of left eye (7/27/2012), Stroke (8/1/2003), Type 2 diabetes mellitus with diabetic polyneuropathy (7/27/2012), and Ulcer. The patient's chief complaint is long, thick toenails and calluses on both feet . No other major pedal complaintsThis patient has documented high risk feet requiring routine maintenance secondary to diabetes mellitis and those secondary complications of diabetes, as mentioned..  She has had an increase in neuropathy pain bilaterally.       PCP: Mamie Cotton MD    Date Last Seen by PCP:       Chief Complaint   Patient presents with    Diabetic Foot Exam     bilateral pcp Dr Cotton 3/13/19    Diabetes Mellitus    Nail Care       Current shoe gear: black leather DM shoes w/ custom inserts     Hemoglobin A1C   Date Value Ref Range Status   05/01/2019 9.9 (H) 4.0 - 5.6 % Final     Comment:     ADA Screening Guidelines:  5.7-6.4%  Consistent with prediabetes  >or=6.5%  Consistent with diabetes  High levels of fetal hemoglobin interfere with the HbA1C  assay. Heterozygous  hemoglobin variants (HbS, HgC, etc)do  not significantly interfere with this assay.   However, presence of multiple variants may affect accuracy.     01/02/2019 9.3 (H) 4.0 - 5.6 % Final     Comment:     ADA Screening Guidelines:  5.7-6.4%  Consistent with prediabetes  >or=6.5%  Consistent with diabetes  High levels of fetal hemoglobin interfere with the HbA1C  assay. Heterozygous hemoglobin variants (HbS, HgC, etc)do  not significantly interfere with this assay.   However, presence of multiple variants may affect accuracy.     06/08/2018 9.6 (H) 4.0 - 5.6 % Final     Comment:     ADA Screening Guidelines:  5.7-6.4%  Consistent with prediabetes  >or=6.5%  Consistent with diabetes  High levels of fetal hemoglobin interfere with the HbA1C  assay. Heterozygous hemoglobin variants (HbS, HgC, etc)do  not significantly interfere with this assay.   However, presence of multiple variants may affect accuracy.         Review of Systems   Constitution: Negative for chills, decreased appetite and fever.   Cardiovascular: Negative for leg swelling.   Skin: Positive for dry skin and nail changes. Negative for color change, flushing, itching, poor wound healing, rash and skin cancer.   Musculoskeletal: Positive for arthritis. Negative for back pain, gout, joint pain, joint swelling and myalgias.   Gastrointestinal: Negative for nausea and vomiting.   Neurological: Positive for numbness. Negative for loss of balance and paresthesias.           Objective:      Physical Exam   Constitutional: She is oriented to person, place, and time. She appears well-developed and well-nourished. No distress.   Cardiovascular:   Dorsalis pedis and posterior tibial pulses are diminished Bilaterally. Toes are cool to touch. Feet are warm proximally.There is decreased digital hair. Skin is atrophic, slightly hyperpigmented, and mildly edematous       Musculoskeletal: Normal range of motion. She exhibits no edema, tenderness or deformity.   Equinus  noted b/l ankles, gastroc. Adequate joint range of motion without pain, limitation, nor crepitation Bilateral pedal joints. Muscle strength is 5/5 in all groups bilaterally.        Neurological: She is alert and oriented to person, place, and time.   [place]-Vincent 5.07 monofilamant testing is diminished [place] feet. Sharp/dull sensation diminished Bilaterally. Light touch absent Bilaterally.       Skin: Skin is warm, dry and intact. No abrasion, no bruising, no burn, no ecchymosis, no laceration, no lesion, no petechiae and no rash noted. She is not diaphoretic. No pallor.   Nails 1-5 b/l  are elongated by  2-6 mm's, thickened by 3-5 mm's, dystrophic, and are darkened in  coloration . Xerosis Bilaterally. No open lesions noted.      Hyperkeratotic tissue noted to distal 2-4 toe tips b/l.   Psychiatric: She has a normal mood and affect. Thought content normal.   Nursing note and vitals reviewed.            Assessment:       Encounter Diagnoses   Name Primary?    Type II diabetes mellitus with neurological manifestations Yes    Corn or callus     Onychomycosis due to dermatophyte          Plan:       Annika was seen today for diabetic foot exam, diabetes mellitus and nail care.    Diagnoses and all orders for this visit:    Type II diabetes mellitus with neurological manifestations    Corn or callus    Onychomycosis due to dermatophyte    Other orders  -     pregabalin (LYRICA) 150 MG capsule; Take 1 capsule (150 mg total) by mouth 2 (two) times daily.          Routine Foot Care    Performed by:  Jean Carlos Pacheco. DPM  Authorized by:  Patient     Consent Done?:  Yes (Verbal)     Nail Care Type:  Debride  Location(s): All  (Left 1st Toe, Left 3rd Toe, Left 2nd Toe, Left 4th Toe, Left 5th Toe, Right 1st Toe, Right 2nd Toe, Right 3rd Toe, Right 4th Toe and Right 5th Toe)  Patient tolerance:  Patient tolerated the procedure well with no immediate complications     With patient's permission, the toenails mentioned above  were aggressively reduced and debrided using a nail nipper, removing all offending nail and debris. The patient will continue to monitor the areas daily, inspect the feet, wear protective shoe gear when ambulatory, and moisturizer to maintain skin integrity.      Callus Care Type: Debride    With patient's permission, the calluses/hyperkeratotic lesions mentioned above were aggressively reduced and debrided using a number 15 blade. The patient will continue to monitor the areas daily, inspect the feet, wear protective shoe gear when ambulatory, and moisturizer to maintain skin integrity.       I counseled the patient on her conditions, their implications and medical management.        - Shoe inspection. Diabetic Foot Education. Patient reminded of the importance of good nutrition and blood sugar control to help prevent podiatric complications of diabetes. Patient instructed on proper foot hygeine. We discussed wearing proper shoe gear, daily foot inspections, never walking without protective shoe gear, never putting sharp instruments to feet, routine podiatric nail visits every 2-3 months.

## 2019-06-13 NOTE — PROGRESS NOTES
Subjective:       Patient ID: Annika Mac is a 68 y.o. female.    Chief Complaint: Follow-up   This is a 68-year-old who presents today for follow-up.  Patient reports she has been is having a few issues recently she had some difficulty paying for her insulin she was getting U500 from her endocrinologist and could no longer afford it and was off her insulin for awhile so she ended up in the ER where she was in DKA and her insulin was switched since she got out she has been on 70/ 30 and she requires a large amount of insulin 120/110/90  Patient had a few lows initially but now her numbers have normalized she is sending her results to her endocrinologist for continued adjustments in her regimen as well.  Patient reports she has also had some difficulty in general with hurts getting some of her other medications such as Lyrica or victoza.   She does take her other medicines regularly and is in need of some refills of some of her medications.  Since she got out of the hospital she reports was doing better with her diabetes although she did have a fall when she was walking up a ramp she just lost control of her walker with incline and fell she injured her hip her knee and her risk primarily which have been giving her some discomfort she is using wrist splints and has a follow-up appoint with a hand specialist.  She has had some swelling in the left wrist seems to bother her more   Transitional Care Note    Family and/or Caretaker present at visit?  No.  Diagnostic tests reviewed/disposition: No diagnosic tests pending after this hospitalization.  Disease/illness education: pt awared   Home health/community services discussion/referrals: Patient does not have home health established from hospital visit.  They do not need home health.  If needed, we will set up home health for the patient.   Establishment or re-establishment of referral orders for community resources: No other necessary community resources.   Discussion  "with other health care providers: No discussion with other health care providers necessary pt has specialty folwo up .           HPI  Review of Systems   Constitutional: Negative for fever.   Respiratory: Negative for cough, shortness of breath and wheezing.    Cardiovascular: Negative for chest pain and palpitations.   Gastrointestinal: Negative for abdominal pain and constipation.   Neurological: Negative for dizziness.        Neuropathy  Wrist pain fall         Objective:     Blood pressure 124/62, pulse 82, height 5' 5" (1.651 m), weight (!) 138 kg (304 lb 3.8 oz).    Physical Exam   Constitutional: No distress.   Walks with walker    HENT:   Head: Normocephalic.   Mouth/Throat: Oropharynx is clear and moist.   Eyes: No scleral icterus.   Neck: Neck supple.   Cardiovascular: Normal rate and regular rhythm. Exam reveals no gallop and no friction rub.   Murmur heard.  Pulmonary/Chest: Effort normal and breath sounds normal. No respiratory distress.   Abdominal: Soft. Bowel sounds are normal. She exhibits no mass. There is no tenderness.   Musculoskeletal: She exhibits no edema.   Normal rom knee  Some disomcffort positinoal chagnes  Hands right tenosynovitis  Left discomfort wrist palpiation  Mild swelling    Neurological: She is alert.   Skin: No erythema.   Psychiatric: She has a normal mood and affect.   Vitals reviewed.      Assessment:       1. Mixed hyperlipidemia    2. Essential hypertension    3. Hyperlipidemia, unspecified hyperlipidemia type    4. Morbid obesity with BMI of 45.0-49.9, adult    5. 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 Fall, sequela    7. Pain in wrist, unspecified laterality        Plan:       Annika was seen today for follow-up.    Diagnoses and all orders for this visit:    Mixed hyperlipidemia  She remains  -     simvastatin (ZOCOR) 40 MG tablet; Take 0.5 tablets (20 mg total) by mouth every evening.    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  History of she is following with Endocrinology closely has a large insulin requirement was previously on U500 until no longer covered by her insurance she reports is now on 70 30    Fall, sequela  She uses walker continued fall precautions    Pain in wrist, unspecified laterality  Recent injury continue wrist splints conservative measures she has an appointment with orthopedist for re-evaluation she does feel she has had some improvement over time    Other orders  -     amLODIPine (NORVASC) 10 MG tablet; Take 1 tablet (10 mg total) by mouth once daily  Refill provided    Recent blood sugars reviewed.    Follow up 4-6 months

## 2019-06-14 ENCOUNTER — OFFICE VISIT (OUTPATIENT)
Dept: ORTHOPEDICS | Facility: CLINIC | Age: 69
End: 2019-06-14
Payer: MEDICARE

## 2019-06-14 VITALS
DIASTOLIC BLOOD PRESSURE: 74 MMHG | SYSTOLIC BLOOD PRESSURE: 146 MMHG | WEIGHT: 293 LBS | BODY MASS INDEX: 48.82 KG/M2 | HEART RATE: 76 BPM | HEIGHT: 65 IN

## 2019-06-14 DIAGNOSIS — M25.532 BILATERAL WRIST PAIN: Primary | ICD-10-CM

## 2019-06-14 DIAGNOSIS — M65.4 DE QUERVAIN'S TENOSYNOVITIS: ICD-10-CM

## 2019-06-14 DIAGNOSIS — M25.531 BILATERAL WRIST PAIN: Primary | ICD-10-CM

## 2019-06-14 DIAGNOSIS — E06.1 DE QUERVAIN THYROIDITIS: ICD-10-CM

## 2019-06-14 PROCEDURE — 99203 OFFICE O/P NEW LOW 30 MIN: CPT | Mod: S$PBB,,, | Performed by: ORTHOPAEDIC SURGERY

## 2019-06-14 PROCEDURE — 99213 OFFICE O/P EST LOW 20 MIN: CPT | Mod: PBBFAC | Performed by: ORTHOPAEDIC SURGERY

## 2019-06-14 PROCEDURE — 99999 PR PBB SHADOW E&M-EST. PATIENT-LVL III: CPT | Mod: PBBFAC,,, | Performed by: ORTHOPAEDIC SURGERY

## 2019-06-14 PROCEDURE — 99999 PR PBB SHADOW E&M-EST. PATIENT-LVL III: ICD-10-PCS | Mod: PBBFAC,,, | Performed by: ORTHOPAEDIC SURGERY

## 2019-06-14 PROCEDURE — 99203 PR OFFICE/OUTPT VISIT, NEW, LEVL III, 30-44 MIN: ICD-10-PCS | Mod: S$PBB,,, | Performed by: ORTHOPAEDIC SURGERY

## 2019-06-14 NOTE — PROGRESS NOTES
[facility]  History & Physical  Orthopedics    SUBJECTIVE:      Chief Complaint:  Bilateral radial wrist pain    Referring Provider: Self, Aaareferral     History of Present Illness:  Patient is a 68 y.o. right hand dominant female who presents today with complaints of bilateral radial sided wrist pain.  She notes that she sustained a low energy fall on June 4, 2019 approximately 10 days ago and ever since that time she has rate had radial sided bilateral wrist pain.  She was placed in a volar short-arm wrist braces in the emergency department and x-rays revealed no acute fractures or dislocations.  She reports that she has been feeling somewhat better since the injury although she still has significant pain.  Of note she is unable to take NSAIDs or Tylenol per her her medical comorbidities by her report.  She reports the left wrist is approximately 8 to 9/10 in severity in the right wrist is 7 to 8/10 severity and she presents. for initial evaluation.  She denies any numbness or tingling or other complaints today..    Onset of symptoms/DOI was 10 days ago.    Symptoms are aggravated by activity and movement.    Symptoms are alleviated by rest and immobilization.    Symptoms consist of pain.    The patient rates their pain as a 9/10.    Attempted treatment(s) and/or interventions include rest and immobilization.     The patient denies any fevers, chills, N/V, D/C and presents for evaluation.       Past Medical History:   Diagnosis Date    Allergy     Anemia 7/27/2012    Arthritis     CHF (congestive heart failure)     CKD (chronic kidney disease) stage 3, GFR 30-59 ml/min 7/27/2012    Clotting disorder     Colon polyp     Deep vein thrombophlebitis of left leg 7/27/2012    Degenerative disc disease     Diabetic peripheral neuropathy associated with type 2 diabetes mellitus 7/27/2012    GERD (gastroesophageal reflux disease)     HTN (hypertension) 7/27/2012    Hyperlipidemia  2012    Lead-induced gout of ankle or foot     right going on three weeks    Nonproliferative diabetic retinopathy of right eye 2012    Obstructive sleep apnea 2012    Osteoporosis     Proliferative diabetic retinopathy of left eye 2012    Stroke 2003    Type 2 diabetes mellitus with diabetic polyneuropathy 2012    Ulcer      Past Surgical History:   Procedure Laterality Date    CATARACT EXTRACTION W/  INTRAOCULAR LENS IMPLANT Left 3/2002    left     CATARACT EXTRACTION W/  INTRAOCULAR LENS IMPLANT Right     OD dr. olivares     SECTION      COLONOSCOPY N/A 2018    [facility]formed [facility] (2ND FLR)    CYST REMOVAL  2011    left side of face    ESOPHAGOGASTRODUODENOSCOPY (EGD) N/A 2018    Performed [facility] END[facility] (2ND FLR)    EYE SURGERY Bilateral 2012    eye implants    GANGLION CYST EXCISION  2007    Right wrist    INSERTION, IOL PROSTHESIS Right 2012    Performed by Linda Olivares MD at [facility] OR 1ST FLR    Pan Retinal Photocoagulation Bilateral     Dr. Monterroso (Proliferative Diabetic Retinopathy)    PHACOEMULSIFICATION, CATARACT Right 2012    Performed [facility] at [facility] OR 1ST FLR    TOTAL ABDOMINAL HYSTERECTOMY  1982    TOTAL KNEE ARTHROPLASTY  2006    left    TRIGGER FINGER RELEASE  2009     Review of patient's allergies indicates:   Allergen Reactions    Iodinated contrast- oral and iv dye Rash     Social History     Social History Narrative    , lives with family; 3 children, 2 grandchildren     Family History   Problem Relation Age of Onset    Diabetes Mother     Hypertension Mother     Heart disease Father     Diabetes Sister     Thyroid disease Brother     Diabetes Brother     Hypertension Brother     No Known Problems Daughter     No Known Problems Son     No Known Problems Daughter     Amblyopia Neg Hx     Blindness  "Neg Hx     Glaucoma Neg Hx     Macular degeneration Neg Hx     Retinal detachment Neg Hx     Strabismus Neg Hx     Colon cancer Neg Hx     Esophageal cancer Neg Hx          Current Outpatient Medications:     allopurinol (ZYLOPRIM) 300 MG tablet, Take 1 tablet (300 mg total) by mouth once daily., Disp: 90 tablet, Rfl: 3    amLODIPine (NORVASC) 10 MG tablet, Take 1 tablet (10 mg total) by mouth once daily., Disp: 90 tablet, Rfl: 3    aspirin 81 MG Chew, Take 1 tablet (81 mg total) by mouth once daily., Disp: , Rfl: 0    BD ULTRA-FINE KIKA PEN NEEDLE 32 gauge x 5/32" Ndle, To use 4 times daily, Disp: 200 each, Rfl: 20    blood sugar diagnostic Strp, 1 each by Misc.(Non-Drug; Combo Route) route 4 (four) times daily. Dx code  E11.42 . Pt use one touch verio., Disp: 200 each, Rfl: 12    blood-glucose meter kit, Use as instructed, true test/result meter, Disp: 1 each, Rfl: 0    blood-glucose meter kit, 1 each by Other route once daily. Use daily. Insurance proffered one touch  E11.42, Disp: 1 each, Rfl: 0    colchicine (COLCRYS) 0.6 mg tablet, Take 0.6 mg by mouth as needed., Disp: , Rfl:     DULoxetine (CYMBALTA) 30 MG capsule, Take 1 capsule (30 mg total) by mouth once daily., Disp: 90 capsule, Rfl: 3    famotidine (PEPCID) 20 MG tablet, Take 1 tablet (20 mg total) by mouth 2 (two) times daily., Disp: 1 tablet, Rfl: 0    fexofenadine (ALLEGRA) 180 MG tablet, TAKE 1 TABLET BY MOUTH ONCE DAILY, Disp: 30 tablet, Rfl: 0    finasteride (PROSCAR) 5 mg tablet, TAKE 1 TABLET(5 MG) BY MOUTH EVERY DAY, Disp: 90 tablet, Rfl: 1    fluocinonide (LIDEX) 0.05 % external solution, AAA scalp qday - bid prn pruritus, Disp: 60 mL, Rfl: 3    furosemide (LASIX) 20 MG tablet, Take 1 tablet (20 mg total) by mouth once daily. Take an extra pill if short of breath or leg swelling, Disp: 120 tablet, Rfl: 3    insulin NPH-insulin regular, 70/30, (NOVOLIN 70/30) 100 unit/mL (70-30) injection, 120 units with breakfast, 110 " units with lunch, and 90 units with dinner; , Disp: 10 mL, Rfl: 11    insulin regular 100 unit/mL Inj injection, Inject 120 Units into the skin 3 (three) times daily before meals. 120 units with breakfast, 110 units with lunch, and 90 units with dinner; , Disp: , Rfl:     ketoconazole (NIZORAL) 2 % shampoo, Wash hair with medicated shampoo at least 2x/week - let sit on scalp at least 5 minutes prior to rinsing, Disp: 120 mL, Rfl: 5    labetalol (NORMODYNE) 300 MG tablet, Take 1 tablet (300 mg total) by mouth 2 (two) times daily., Disp: 180 tablet, Rfl: 3    lancets 31 gauge Misc, 1 lancet by Misc.(Non-Drug; Combo Route) route 4 (four) times daily. E11.42 . Prescribed one touch, Disp: 200 each, Rfl: 6    lancets 33 gauge Misc, 1 lancet by Misc.(Non-Drug; Combo Route) route 4 (four) times daily. Dx code E11.42, Disp: 200 each, Rfl: 12    lancets Misc, by Misc.(Non-Drug; Combo Route) route., Disp: , Rfl:     liraglutide 0.6 mg/0.1 mL, 18 mg/3 mL, subq PNIJ (VICTOZA 3-MILAN) 0.6 mg/0.1 mL (18 mg/3 mL) PnIj, Inject 1.2 mg into the skin once daily., Disp: 3 mL, Rfl: 6    multivitamin (THERAGRAN) per tablet, Take 1 tablet by mouth once daily., Disp: , Rfl:     pregabalin (LYRICA) 150 MG capsule, Take 1 capsule (150 mg total) by mouth 2 (two) times daily., Disp: 60 capsule, Rfl: 5    simvastatin (ZOCOR) 40 MG tablet, Take 0.5 tablets (20 mg total) by mouth every evening., Disp: 90 tablet, Rfl: 4    triamcinolone acetonide 0.1% (KENALOG) 0.1 % ointment, AAA bid hands, Disp: 60 g, Rfl: 1    TRUETEST TEST STRIPS Strp, 1 strip by Misc.(Non-Drug; Combo Route) route 4 (four) times daily. Dx code E11.42. Prescribed cover by insurance., Disp: 400 strip, Rfl: 4      Review of Systems:  Constitutional: no fever or chills  Eyes: no visual changes  ENT: no nasal congestion or sore throat  Respiratory: no cough or shortness of breath  Cardiovascular: no chest pain  Gastrointestinal: no nausea or vomiting,  "tolerating diet  Musculoskeletal: pain and soreness    OBJECTIVE:      Vital Signs (Most Recent):  Vitals:    06/14/19 0953   BP: (!) 146/74   BP Location: Left arm   Patient Position: Sitting   BP Method: Large (Automatic)   Pulse: 76   Weight: (!) 138 kg (304 lb 3.8 oz)   Height: 5' 5" (1.651 m)     Body mass index is 50.63 kg/m².      Physical Exam:  Constitutional: The patient appears well-developed and well-nourished. No distress.   Head: Normocephalic and atraumatic.   Nose: Nose normal.   Eyes: Conjunctivae and EOM are normal.   Neck: No tracheal deviation present.   Cardiovascular: Normal rate and intact distal pulses.    Pulmonary/Chest: Effort normal. No respiratory distress.   Abdominal: There is no guarding.   Neurological: The patient is alert.   Psychiatric: The patient has a normal mood and affect.     Bilateral Hand/Wrist Examination:    Observation/Inspection:  Swelling  none    Deformity  none  Discoloration  none     Scars   none    Atrophy  none    HAND/WRIST EXAMINATION:  Finkelstein's Test   positive  WHAT Test    markedly positive  Snuff box tenderness   Neg  Juarez's Test    Neg  Hook of Hamate Tenderness  Neg  CMC grind    Neg  Circumduction test   Neg    Neurovascular Exam:  Digits WWP, brisk CR < 3s throughout  NVI motor/LTS to M/R/U nerves, radial pulse 2+  Tinel's Test - Carpal Tunnel  Neg  Tinel's Test - Cubital Tunnel  Neg  Phalen's Test    Neg  Median Nerve Compression Test Neg    ROM hand/elbow full, painless  Wrist and thumb range of motion painful and limited secondary to pain    Diagnostic Results:     Xray -  x-ray bilateral wrists demonstrates no acute fractures or dislocations, mild did degenerative disease  EMG - none    ASSESSMENT/PLAN:      68 y.o. yo female with bilateral de Quervain tenosynovitis status post recent fall  Plan:  At this point in time the patient seems to be suffering from significant bilateral de Quervain tenosynovitis.  I will switch her from her volar " cock-up wrist braces into bilateral thumb spica braces as well as order some dedicated occupational therapy to begin tendon gliding exercises.  She will proceed with these therapies in follow-up in 6 weeks for re-evaluation she is not interested in injection today.          Marky Hagen M.D.     Please be aware that this note has been generated with the assistance of [employer] voice-to-text.  Please excuse any spelling or grammatical errors.

## 2019-06-19 ENCOUNTER — CLINICAL SUPPORT (OUTPATIENT)
Dept: REHABILITATION | Facility: HOSPITAL | Age: 69
End: 2019-06-19
Payer: MEDICARE

## 2019-06-19 DIAGNOSIS — M62.81 MUSCLE WEAKNESS: ICD-10-CM

## 2019-06-19 DIAGNOSIS — M25.631 JOINT STIFFNESS OF BOTH WRISTS: ICD-10-CM

## 2019-06-19 DIAGNOSIS — M25.632 JOINT STIFFNESS OF BOTH WRISTS: ICD-10-CM

## 2019-06-19 DIAGNOSIS — M25.532 PAIN IN BOTH WRISTS: ICD-10-CM

## 2019-06-19 DIAGNOSIS — M25.531 PAIN IN BOTH WRISTS: ICD-10-CM

## 2019-06-19 PROCEDURE — G8988 SELF CARE GOAL STATUS: HCPCS | Mod: CJ,PO

## 2019-06-19 PROCEDURE — 97110 THERAPEUTIC EXERCISES: CPT | Mod: PO

## 2019-06-19 PROCEDURE — 97165 OT EVAL LOW COMPLEX 30 MIN: CPT | Mod: PO

## 2019-06-19 PROCEDURE — G8987 SELF CARE CURRENT STATUS: HCPCS | Mod: CK,PO

## 2019-06-19 NOTE — PLAN OF CARE
[facility] Occupational Therapy  Initial Evaluation     Date: 6/19/2019  Name: Annika Mac  Clinic Number: [id number]    Therapy Diagnosis:   Encounter Diagnoses   Name Primary?    Pain in both wrists     Joint stiffness of both wrists     Muscle weakness      Physician: Marky Hagen MD    Physician Orders: Eval & Treat  Medical Diagnosis: B de Quervain's  Surgical Procedure and Date: None  Evaluation Date: 06/19/19  Insurance Authorization Period Expiration: 06/13/2020  Plan of Care Certification Period: 06/19/19 to 07/02/19  Date of Return to MD: 07/24/19    Visit # / Visits authorized: 1   Time In:1:25 pm  Time Out: 2:10 pm  Total Billable Time: 45 minutes    Precautions:  Standard, Diabetes and Fall    Subjective     Involved Side: Both wrist/hands  Dominant Side: Right  Date of Onset: 06/04/19  Mechanism of Injury: Patient fell while going to podiatry clinic.   History of Current Condition: Immediately after her fall she was taken to the ED. X-rays were taken and patient was examined. SHe was released with B volar wrist cock-up splints and referred to Dr Hagen for follow up. Upon her follow up visit with Dr. Hagen, she had some improvement of condition, but pain persisted.  Recommendation for discontinuing wear of volar wrist cock-up splints in exchanged for wearing thumb spica splints  Surgical Procedure: None  Imaging: X-rays  Previous Therapy: None for this condition    Past Medical History/Physical Systems Review:   Annika Mac  has a past medical history of Allergy, Anemia, Arthritis, CHF (congestive heart failure), CKD (chronic kidney disease) stage 3, GFR 30-59 ml/min, Clotting disorder, Colon polyp, Deep vein thrombophlebitis of left leg, Degenerative disc disease, Diabetic peripheral neuropathy associated with type 2 diabetes mellitus, GERD (gastroesophageal reflux disease), HTN (hypertension), Hyperlipidemia, Lead-induced gout of ankle or foot, Nonproliferative diabetic  retinopathy of right eye, Obstructive sleep apnea, Osteoporosis, Proliferative diabetic retinopathy of left eye, Stroke, Type 2 diabetes mellitus with diabetic polyneuropathy, and Ulcer.    Annika Mac  has a past surgical history that includes Total knee arthroplasty (2006); Total abdominal hysterectomy (); Ganglion cyst excision (2007); Cyst Removal (2011); Trigger finger release (2009);  section; Cataract extraction w/  intraocular lens implant (Left, 3/2002); Cataract extraction w/  intraocular lens implant (Right, ); Pan Retinal Photocoagulation (Bilateral); Eye surgery (Bilateral, ); and Colonoscopy (N/A, 2018).    Annika has a current medication list which includes the following prescription(s): allopurinol, amlodipine, aspirin, bd ultra-fine myesha pen needle, blood sugar diagnostic, blood-glucose meter, blood-glucose meter, colchicine, duloxetine, famotidine, fexofenadine, finasteride, fluocinonide, furosemide, insulin nph-insulin regular (70/30), insulin regular, ketoconazole, labetalol, lancets, lancets, lancets, liraglutide 0.6 mg/0.1 ml (18 mg/3 ml) subq pnij, multivitamin, pregabalin, simvastatin, triamcinolone acetonide 0.1%, truetest test strips, and insulin glargine (toujeo).    Review of patient's allergies indicates:   Allergen Reactions    Iodinated contrast- oral and iv dye Rash        Patient's Goals for Therapy: Return to prior level of functioning and with no pain    Pain:  Functional Pain Scale Rating 0-10:   5/10 on average  5/10 at best  7/10 at worst  Location: B forearm, wrist & thumb  Description: Aching, Tingling, Electric, Shooting and Hot  Aggravating Factors: Night Time, Lifting and using hands in manipulating objects (opening/closing containers, etc)  Easing Factors: thumb spica splints    Occupation:    Working presently: home-maker  Duties: cooking, cleaning, laundry, grocery/shopping, etc    Functional Limitations/Social  History:    Previous functional status includes: Independent with all ADLs. Independent    Current FunctionalStatus   Home/Living environment : lives with their spouse      Limitation of Functional Status as follows:   ADLs/IADLs:     - Feeding: moderate difficulty    - Bathing: minimal difficulty    - Dressing/Grooming: moderate difficulty    - Driving: n/a      Objective     Observation/Appearance:  B wrist/thumb Joint stiffness and B wrist/thumb Edema present and hypersensitive to touch (L side only)    Edema. Measured in centimeters.   6/19/2019 6/19/2019    Left Right   Wrist Crease 18.9 19.2   Mid palm 20.8 21.4   MCPs 20.0 19.5     Edema. Measured in centimeters.   6/19/2019 6/19/2019    Left Right   Thumb:     Prox. Phalanx 7.4 7.5   IP 7.1 7.4      Left Right   Wrist E/F 65* / 55* 60 / 65 *   Wrist RD/UD 15* / 30* 15* / 45   Thumb R/P Abd 35* / 43* 50 / 50   Thumb MP flex 30* 60   Thumb IP flex 31* 53   Thumb JASSO's 61 113   Thumb [place] To SF DPC 3.0 cm fro SF DPC *   * pain with testing    Sensation: Numbness & tingling in L wrist to thumb tip; intact for light touch & temperature, B'ly     Strength (Dyanmometer) and Pinch Strength (Pinch Gauge)  Measured in pounds and psi. Average of three trials.   6/19/2019 6/19/2019    Left Right   Rung II 10* 54   Watts Pinch 6* 15*   3pt Pinch 1* 7*   2pt Pinch NT 9*   * pain with testing      CMS Impairment/Limitation/Restriction for FOTO Wrist Survey    Therapist reviewed FOTO scores for Annika Mac on 6/19/2019.   FOTO documents entered into [employer] - see Media section.    Limitation Score: 49%  Category: Self Care    Current : CK = at least 40% but < 60% impaired, limited or restricted  Goal: CJ = at least 20% but < 40% impaired, limited or restricted         Treatment     Treatment Time In: 1:55 pm  Treatment Time Out: 2:10 pm  Total Treatment time separate from Evaluation time:15 minutes    Annika received therapeutic exercises for 15 minutes including:  -Performed  HEP, see attached for details    Home Exercise Program/Education:  Issued HEP (see patient instructions in EMR) and educated on modality use for pain management . Exercises were reviewed and Annika was able to demonstrate them prior to the end of the session.   Pt received a written copy of exercises to perform at home. Annika demonstrated good  understanding of the education provided.  Pt was advised to perform these exercises free of pain, and to stop performing them if pain occurs.    Patient/Family Education: role of OT, goals for OT, scheduling/cancellations - pt verbalized understanding. Discussed insurance limitations with patient.    Additional Education provided: importance of good compliance with wear of pre-ling thumb spica splints    Assessment     Annika Mac is a 68 y.o. female referred to outpatient occupational therapy and presents with a medical diagnosis of B wrist/hands, resulting in Decreased ROM, Decreased  strength, Decreased pinch strength, Decreased functional hand use, Increased pain, Edema, Joint Stiffness, Diminished/Impaired Sensation and Diminished/Impaired Coordination and demonstrates limitations as described in the chart below. Following medical record review it is determined that pt will benefit from occupational therapy services in order to maximize pain free and/or functional use of bilateral wrist/hand. The following goals were discussed with the patient and patient is in agreement with them as to be addressed in the treatment plan. The patient's rehab potential is Good.     Anticipated barriers to occupational therapy: DM, arthritis  Pt has no cultural, educational or language barriers to learning provided.    Profile and History Assessment of Occupational Performance Level of Clinical Decision Making Complexity Score   Occupational Profile:   Annika Mac is a 68 y.o. female who lives with their spouse and is currently home-maker. Annika Mac has difficulty with  feeding,  bathing, grooming and dressing  shopping and housework/household chores  affecting his/her daily functional abilities. His/her main goal for therapy is return to PLOF.     Comorbidities:   diabetes and arthritis    Medical and Therapy History Review:   Expanded               Performance Deficits    Physical:  Joint Mobility  Edema   Strength  Pinch Strength  Fine Motor Coordination  Pain    Cognitive:  No Deficits    Psychosocial:    No Deficits     Clinical Decision Making:  low    Assessment Process:  Detailed Assessments    Modification/Need for Assistance:  Minimal-Moderate Modifications/Assistance    Intervention Selection:  Several Treatment Options       low  Based on PMHX, co morbidities , data from assessments and functional level of assistance required with task and clinical presentation directly impacting function.       The following goals were discussed with the patient and patient is in agreement with them as to be addressed in the treatment plan.     Goals:  Short Term Goals: (in 2 weeks-07/02/19)  1) Patient will be independent in HEP  2) Decrease pain in B wrist/thumbs to no more than 3-4/10 worst during ADL/IADL's   3) Increase AROM in L wrist flex & UD by 5 degrees for improved functioning in ADL/IADL's  4) Increase JASSO L thumb by 10 degrees for improved functioning in ADL/IADL's  5) Increase B  strength by 5-8 psi for increased functional use  6) Decrease edema in B wrist by .2-.3 cm     Long Term Goals: (in 6 weeks-08/02/19)  1) Decrease pain in B wrist/thumb to no more than 1-2/10 worst with light to moderate ADL/IADL's   2) Increase AROM of B wrist/thumb to WFL for increased functioning in ADL/IADL's  3) Increase strength in B  by 30% of initial measures for improved functioning in ADL/IADL's  4) Increased functioning in ADL/IADL's, as evidenced by a FOTO impairment rating of no more than 30%  5) Decrease edema in B wrist to trace or none      Plan   Certification Period/Plan of  care expiration: 6/19/2019 to 08/02/19.    Outpatient Occupational Therapy 2 times weekly for 6 weeks to include the following interventions: Paraffin, Manual therapy/joint mobilizations, Modalities for pain management, US 3 mhz, Therapeutic exercises/activities., Strengthening, Orthotic Fabrication/Fit/Training, Edema Control, Electrical Modalities and Joint Protection.      Pratik Veloz, OT

## 2019-06-21 RX ORDER — IRBESARTAN 300 MG/1
300 TABLET ORAL NIGHTLY
Qty: 90 TABLET | Refills: 3 | Status: SHIPPED | OUTPATIENT
Start: 2019-06-21 | End: 2020-05-21 | Stop reason: SDUPTHER

## 2019-06-24 ENCOUNTER — CLINICAL SUPPORT (OUTPATIENT)
Dept: REHABILITATION | Facility: HOSPITAL | Age: 69
End: 2019-06-24
Payer: MEDICARE

## 2019-06-24 DIAGNOSIS — M25.532 PAIN IN BOTH WRISTS: ICD-10-CM

## 2019-06-24 DIAGNOSIS — M25.632 JOINT STIFFNESS OF BOTH WRISTS: ICD-10-CM

## 2019-06-24 DIAGNOSIS — M25.631 JOINT STIFFNESS OF BOTH WRISTS: ICD-10-CM

## 2019-06-24 DIAGNOSIS — M62.81 MUSCLE WEAKNESS: ICD-10-CM

## 2019-06-24 DIAGNOSIS — M25.531 PAIN IN BOTH WRISTS: ICD-10-CM

## 2019-06-24 PROCEDURE — 97110 THERAPEUTIC EXERCISES: CPT | Mod: PO

## 2019-06-24 PROCEDURE — 97140 MANUAL THERAPY 1/> REGIONS: CPT | Mod: PO

## 2019-06-24 PROCEDURE — 97018 PARAFFIN BATH THERAPY: CPT | Mod: PO,59

## 2019-06-24 NOTE — PROGRESS NOTES
"  Occupational Therapy Daily Treatment Note     Date: 6/24/2019  Name: Annika Mac  Clinic Number: [id number]    Therapy Diagnosis:   Encounter Diagnoses   Name Primary?    Pain in both wrists     Joint stiffness of both wrists     Muscle weakness      Physician: Marky Hagen MD    Physician Orders: Eval & Treat  Medical Diagnosis: B de Quervain's  Surgical Procedure and Date: None  Evaluation Date: 06/19/19  Insurance Authorization Period Expiration: 06/13/2020  Plan of Care Certification Period: 06/19/19 to 07/02/19  Date of Return to MD: 07/24/19      Visit # / Visits authorized: 2 / 50  Time In:1:00 pm  Time Out: 2:00 pm   Total Billable Time: 60 minutes (P, 1MT, 2TE)    Precautions:  Standard and Diabetes      Subjective     Pt reports: "it mostly hurts at night".  Patient admits wearing B thumb spica splints at all times  she was compliant with home exercise program given last session.   Response to previous treatment:No adverse reactions  Functional change: None since initial ave at last visit    Pain: 7/10  Location: bilateral wrists/hands      Objective     Annika received the following supervised modalities after being cleared for contradictions for 10 minutes:   -Paraffin w/ MHP to B wrist/hand, pre-tx to decrease pain & increase tissue extensibility    Annika received the following manual therapy techniques for 15 minutes:   -Edema mgmt w/ lymphatic drainage technique;  Deep STM, including MFR, CFM, TPR & scar mgmt to B forearm/wrist/hands, using hand techniques and IASTM tools to increase blood flow/circulation, improve soft tissue pliability and decrease pain.  -Attempted Kinesiotapng, for de Quervain's taping pattern, but tape was not adhering well to her skin.  Patient was instructed on purpose, wear/care and removal of KT.     Annika received therapeutic exercises for 35 minutes including:  AROM B Wrist/Hands    forearm sup/pron 5 reps    wrist E/F, RD/UD 5 reps each    thumb Rad Abd 5 reps "    thumb/pinky slides 5 reps        Dexterciser  2 min   Isospheres 2 min   9 Peg  1 trial, R supinated, L pronated    [place] Manipulation 1 trial (pick-up/replace)        T-putty: yellow    -Molding 2 min             Home Exercises and Education Provided     Education provided:   - Reviewed HEp  - Progress towards goals     Written Home Exercises Provided: Patient instructed to cont prior HEP.  Exercises were reviewed and Annika was able to demonstrate them prior to the end of the session.  Annika demonstrated good  understanding of the HEP provided.   .   See EMR under Patient Instructions for exercises provided prior visit.        Assessment     Patient tolerated treatment fairly well today with only a slight increase in pain of L wrist/hand.  She was able to perform all TE's with her R wrist/hand, but had some pain and difficulty with some for her L wrist/hand.  Edema is minimal in R wrist and moderate to minimal in R wrist today.     Annika is progressing well towards her goals and there are no updates to goals at this time. Pt prognosis is Good.     Pt will continue to benefit from skilled outpatient occupational therapy to address the deficits listed in the problem list on initial evaluation provide pt/family education and to maximize pt's level of independence in the home and community environment.     Anticipated barriers to occupational therapy: Arthritis, Diabetes    Pt's spiritual, cultural and educational needs considered and pt agreeable to plan of care and goals.    Goals:  Short Term Goals: (in 2 weeks-07/02/19)  1) Patient will be independent in HEP  2) Decrease pain in B wrist/thumbs to no more than 3-4/10 worst during ADL/IADL's   3) Increase AROM in L wrist flex & UD by 5 degrees for improved functioning in ADL/IADL's  4) Increase JASSO L thumb by 10 degrees for improved functioning in ADL/IADL's  5) Increase B  strength by 5-8 psi for increased functional use  6) Decrease edema in B wrist by .2-.3 cm       Long Term Goals: (in 6 weeks-08/02/19)  1) Decrease pain in B wrist/thumb to no more than 1-2/10 worst with light to moderate ADL/IADL's   2) Increase AROM of B wrist/thumb to WFL for increased functioning in ADL/IADL's  3) Increase strength in B  by 30% of initial measures for improved functioning in ADL/IADL's  4) Increased functioning in ADL/IADL's, as evidenced by a FOTO impairment rating of no more than 30%  5) Decrease edema in B wrist to trace or none       Plan     Certification Period/Plan of care expiration: 6/19/2019 to 08/02/19.     Outpatient Occupational Therapy 2 times weekly for 6 weeks to include the following interventions: Paraffin, Manual therapy/joint mobilizations, Modalities for pain management, US 3 mhz, Therapeutic exercises/activities., Strengthening, Orthotic Fabrication/Fit/Training, Edema Control, Electrical Modalities and Joint Protection.       Pratik Veloz, OT

## 2019-06-26 ENCOUNTER — CLINICAL SUPPORT (OUTPATIENT)
Dept: REHABILITATION | Facility: HOSPITAL | Age: 69
End: 2019-06-26
Payer: MEDICARE

## 2019-06-26 DIAGNOSIS — M25.531 PAIN IN BOTH WRISTS: ICD-10-CM

## 2019-06-26 DIAGNOSIS — M62.81 MUSCLE WEAKNESS: ICD-10-CM

## 2019-06-26 DIAGNOSIS — M25.631 JOINT STIFFNESS OF BOTH WRISTS: ICD-10-CM

## 2019-06-26 DIAGNOSIS — M25.532 PAIN IN BOTH WRISTS: ICD-10-CM

## 2019-06-26 DIAGNOSIS — M25.632 JOINT STIFFNESS OF BOTH WRISTS: ICD-10-CM

## 2019-06-26 PROCEDURE — 97140 MANUAL THERAPY 1/> REGIONS: CPT | Mod: PO

## 2019-06-26 PROCEDURE — 97110 THERAPEUTIC EXERCISES: CPT | Mod: PO

## 2019-06-26 PROCEDURE — 97018 PARAFFIN BATH THERAPY: CPT | Mod: PO

## 2019-06-26 NOTE — PROGRESS NOTES
"  Occupational Therapy Daily Treatment Note     Date: 6/26/2019  Name: Annika Mac  Clinic Number: [id number]    Therapy Diagnosis:   Encounter Diagnoses   Name Primary?    Pain in both wrists     Joint stiffness of both wrists     Muscle weakness      Physician: Marky Hagen MD    Physician Orders: Eval & Treat  Medical Diagnosis: B de Quervain's  Surgical Procedure and Date: None  Evaluation Date: 06/19/19  Insurance Authorization Period Expiration: 06/13/2020  Plan of Care Certification Period: 06/19/19 to 07/02/19  Date of Return to MD: 07/24/19      Visit # / Visits authorized: 2 / 50  Time In:1:15 pm  Time Out: 2:15 pm   Total Billable Time: 60 minutes (P, 1MT, 2TE)    Precautions:  Standard and Diabetes      Subjective     Pt reports: "the pain is a little bit less now". Referring to post-tx pain in B wrists  she was compliant with home exercise program given last session.   Response to previous treatment:No adverse reactions  Functional change: None since initial ave at last visit    Pain: R=5/10 L=6/10 pre-tx  Location: bilateral wrists/hands      Objective     Annika received the following supervised modalities after being cleared for contradictions for 10 minutes:   -Paraffin w/ MHP to B wrist/hand, pre-tx to decrease pain & increase tissue extensibility    Annika received the following manual therapy techniques for 20 minutes:   -Edema mgmt w/ lymphatic drainage technique;  Deep STM, including MFR, CFM, TPR & scar mgmt to B forearm/wrist/hands, using hand techniques and IASTM tools to increase blood flow/circulation, improve soft tissue pliability and decrease pain.  -Kinesiotapng, for de Quervain's taping pattern B'ly.     Annika received therapeutic exercises for 30 minutes including:  AROM B Wrist/Hands    forearm sup/pron 5 reps    wrist E/F, RD/UD 5 reps each    thumb Rad Abd 5 reps    thumb/pinky slides 5 reps        Dexterciser  2 min   Isospheres 2 min   9 Peg  1 trial, R supinated, L pronated  "   [place] Manipulation 1 trial (pick-up/replace)        T-putty: yellow    -Molding 2 min   -grasp/manipulation 3 reps, R hand only; L hand w/ pain        Home Exercises and Education Provided     Education provided:   - Reviewed HEP    Written Home Exercises Provided: Patient instructed to cont prior HEP.  Exercises were reviewed and Annika was able to demonstrate them prior to the end of the session.  Annika demonstrated good  understanding of the HEP provided.     See EMR under Patient Instructions for exercises provided prior visit.        Assessment     Patient continues to tolerate treatment fair wiith only a slight increase in pain of L wrist/hand.  Edema is minimal in R wrist and moderate to minimal in R wrist today.     Annika is progressing well towards her goals and there are no updates to goals at this time. Pt prognosis is Good.     Pt will continue to benefit from skilled outpatient occupational therapy to address the deficits listed in the problem list on initial evaluation provide pt/family education and to maximize pt's level of independence in the home and community environment.     Anticipated barriers to occupational therapy: Arthritis, Diabetes    Pt's spiritual, cultural and educational needs considered and pt agreeable to plan of care and goals.    Goals:  Short Term Goals: (in 2 weeks-07/02/19)  1) Patient will be independent in HEP  2) Decrease pain in B wrist/thumbs to no more than 3-4/10 worst during ADL/IADL's   3) Increase AROM in L wrist flex & UD by 5 degrees for improved functioning in ADL/IADL's  4) Increase JASSO L thumb by 10 degrees for improved functioning in ADL/IADL's  5) Increase B  strength by 5-8 psi for increased functional use  6) Decrease edema in B wrist by .2-.3 cm      Long Term Goals: (in 6 weeks-08/02/19)  1) Decrease pain in B wrist/thumb to no more than 1-2/10 worst with light to moderate ADL/IADL's   2) Increase AROM of B wrist/thumb to WFL for increased functioning in  ADL/IADL's  3) Increase strength in B  by 30% of initial measures for improved functioning in ADL/IADL's  4) Increased functioning in ADL/IADL's, as evidenced by a FOTO impairment rating of no more than 30%  5) Decrease edema in B wrist to trace or none       Plan     Certification Period/Plan of care expiration: 6/19/2019 to 08/02/19.     Outpatient Occupational Therapy 2 times weekly for 6 weeks to include the following interventions: Paraffin, Manual therapy/joint mobilizations, Modalities for pain management, US 3 mhz, Therapeutic exercises/activities., Strengthening, Orthotic Fabrication/Fit/Training, Edema Control, Electrical Modalities and Joint Protection.       Pratik Veloz, OT

## 2019-06-27 ENCOUNTER — NURSE TRIAGE (OUTPATIENT)
Dept: ADMINISTRATIVE | Facility: CLINIC | Age: 69
End: 2019-06-27

## 2019-06-28 ENCOUNTER — TELEPHONE (OUTPATIENT)
Dept: ENDOCRINOLOGY | Facility: CLINIC | Age: 69
End: 2019-06-28

## 2019-06-28 NOTE — TELEPHONE ENCOUNTER
"    Reason for Disposition   [1] Request for URGENT new prescription or refill of "essential" medication (i.e., likelihood of harm to patient if not taken) AND [2] triager unable to fill per unit policy    Protocols used: MEDICATION QUESTION CALL-A-ANCELMO Roblero was seen in ED today for cbg of 30, she was brought in by ambulance. MD in ED prescribed a sliding scale novolog but she tried to fill it on the way home at a pharmacy who told her it would be $300 bc it wasn't covered by her insurance. Patient's blood sugar during call is 177. Spoke to on call provider, Dr. Craven, who advised patient take 20 units of the nph insulin she currently has at home and follow up with her endocrinologist/pcp in the morning as she would need medication adjustment. Advised daughter/patient to call in the morning.  "

## 2019-06-28 NOTE — TELEPHONE ENCOUNTER
Spoke to patient whom stated she has a slight headache.her BG is 163 she took 20 units of the 70/30 and ate cereals for breakfast this morning informed pt I will send information over to provider and give her a call back if any changes need to be made pt verbalized understanding

## 2019-06-28 NOTE — TELEPHONE ENCOUNTER
Reason for Disposition   Caller has medication question, adult has minor symptoms, caller declines triage, and triager answers question    Protocols used: MEDICATION QUESTION CALL-A-    Patient calling back because cbg is 265. Advised to drink 3 glasses of water over the next 2 hours and recheck at 2300. Advised insulin may take time to bring blood sugar down (patient called within the last hour and instructions were given about dose for tonight). Advised if any other questions, family/patient should call back.

## 2019-07-01 ENCOUNTER — CLINICAL SUPPORT (OUTPATIENT)
Dept: REHABILITATION | Facility: HOSPITAL | Age: 69
End: 2019-07-01
Payer: MEDICARE

## 2019-07-01 ENCOUNTER — OFFICE VISIT (OUTPATIENT)
Dept: ENDOCRINOLOGY | Facility: CLINIC | Age: 69
End: 2019-07-01
Payer: MEDICARE

## 2019-07-01 VITALS
HEART RATE: 92 BPM | DIASTOLIC BLOOD PRESSURE: 58 MMHG | HEIGHT: 65 IN | SYSTOLIC BLOOD PRESSURE: 140 MMHG | BODY MASS INDEX: 48.82 KG/M2 | WEIGHT: 293 LBS | RESPIRATION RATE: 18 BRPM

## 2019-07-01 DIAGNOSIS — M25.531 PAIN IN BOTH WRISTS: ICD-10-CM

## 2019-07-01 DIAGNOSIS — M25.631 JOINT STIFFNESS OF BOTH WRISTS: ICD-10-CM

## 2019-07-01 DIAGNOSIS — R73.9 HYPERGLYCEMIA: ICD-10-CM

## 2019-07-01 DIAGNOSIS — M62.81 MUSCLE WEAKNESS: ICD-10-CM

## 2019-07-01 DIAGNOSIS — Z79.4 TYPE 2 DIABETES MELLITUS WITH DIABETIC POLYNEUROPATHY, WITH LONG-TERM CURRENT USE OF INSULIN: Primary | ICD-10-CM

## 2019-07-01 DIAGNOSIS — E66.01 MORBID OBESITY DUE TO EXCESS CALORIES: ICD-10-CM

## 2019-07-01 DIAGNOSIS — E11.42 TYPE 2 DIABETES MELLITUS WITH DIABETIC POLYNEUROPATHY, WITH LONG-TERM CURRENT USE OF INSULIN: Primary | ICD-10-CM

## 2019-07-01 DIAGNOSIS — N18.4 STAGE 4 CHRONIC KIDNEY DISEASE: ICD-10-CM

## 2019-07-01 DIAGNOSIS — E11.649 HYPOGLYCEMIA UNAWARENESS ASSOCIATED WITH TYPE 2 DIABETES MELLITUS: ICD-10-CM

## 2019-07-01 DIAGNOSIS — M25.532 PAIN IN BOTH WRISTS: ICD-10-CM

## 2019-07-01 DIAGNOSIS — M25.632 JOINT STIFFNESS OF BOTH WRISTS: ICD-10-CM

## 2019-07-01 PROCEDURE — 99999 PR PBB SHADOW E&M-EST. PATIENT-LVL III: CPT | Mod: PBBFAC,,, | Performed by: INTERNAL MEDICINE

## 2019-07-01 PROCEDURE — 97110 THERAPEUTIC EXERCISES: CPT | Mod: PO

## 2019-07-01 PROCEDURE — 99214 PR OFFICE/OUTPT VISIT, EST, LEVL IV, 30-39 MIN: ICD-10-PCS | Mod: S$PBB,,, | Performed by: INTERNAL MEDICINE

## 2019-07-01 PROCEDURE — 99214 OFFICE O/P EST MOD 30 MIN: CPT | Mod: S$PBB,,, | Performed by: INTERNAL MEDICINE

## 2019-07-01 PROCEDURE — 99213 OFFICE O/P EST LOW 20 MIN: CPT | Mod: PBBFAC | Performed by: INTERNAL MEDICINE

## 2019-07-01 PROCEDURE — 99999 PR PBB SHADOW E&M-EST. PATIENT-LVL III: ICD-10-PCS | Mod: PBBFAC,,, | Performed by: INTERNAL MEDICINE

## 2019-07-01 PROCEDURE — 97018 PARAFFIN BATH THERAPY: CPT | Mod: PO,59

## 2019-07-01 PROCEDURE — 97140 MANUAL THERAPY 1/> REGIONS: CPT | Mod: PO

## 2019-07-01 NOTE — PROGRESS NOTES
Occupational Therapy Daily Treatment Note     Date: 7/1/2019  Name: Annika Mac  Clinic Number: [id number]    Therapy Diagnosis:   Encounter Diagnoses   Name Primary?    Pain in both wrists     Joint stiffness of both wrists     Muscle weakness      Physician: Marky Hagen MD    Physician Orders: Eval & Treat  Medical Diagnosis: B de Quervain's  Surgical Procedure and Date: None  Evaluation Date: 06/19/19  Insurance Authorization Period Expiration: 06/13/2020  Plan of Care Certification Period: 06/19/19 to 07/02/19  Date of Return to MD: 07/24/19      Visit # / Visits authorized: 4 / 50  Time In:1:10 pm  Time Out: 2:05 pm   Total Billable Time: 55 minutes (P, 1MT, 2TE)    Precautions:  Standard and Diabetes      Subjective     Pt reports: having to go to the ED over the weekend due to low blood sugar level.  She is reporting an increase in pain since last treatment.   she was compliant with home exercise program given last session.   Response to previous treatment:No adverse reactions  Functional change: None since initial ave at last visit    Pain: R=6/10 L=7/10 pre-tx  Location: bilateral wrists/hands      Objective     Annika received the following supervised modalities after being cleared for contradictions for 8 minutes:   -Paraffin w/ MHP to B wrist/hand, pre-tx to decrease pain & increase tissue extensibility    Annika received the following manual therapy techniques for 22 minutes:   -Edema mgmt w/ lymphatic drainage technique;  Deep STM, including MFR, CFM, TPR & scar mgmt to B forearm/wrist/hands, using hand techniques and IASTM tools to increase blood flow/circulation, improve soft tissue pliability and decrease pain.  -Kinesiotapng, for de Quervain's taping pattern B'ly.     Annika received therapeutic exercises for 25 minutes including:    AROM B Wrist/Hands    forearm sup/pron 5 reps    wrist E/F, RD/UD 5 reps each    thumb Rad Abd 5 reps    thumb/pinky slides 5 reps        Dexterciser  2 min    Isospheres 2 min   9 Peg  1 trial, R supinated, L pronated    Sesser Manipulation 1 trial (pick-up/replace)        T-putty: yellow    -Molding (not today) 2 min   -grasp/manipulation (not today) 3 reps, R hand only; L hand w/ pain        Home Exercises and Education Provided     Education provided:   - Reviewed HEP    Written Home Exercises Provided: Patient instructed to cont prior HEP.  Exercises were reviewed and Annika was able to demonstrate them prior to the end of the session.  Annika demonstrated good  understanding of the HEP provided.     See EMR under Patient Instructions for exercises provided prior visit.        Assessment     Patient is tolerating treatment fairly well. She is making more progress in her R wrist/hand compared to her L wrist/hand.  Edema has decreased in her R wrist, but remains moderate in her L wrist/hand.       Annika is progressing well towards her goals and there are no updates to goals at this time. Pt prognosis is Good.     Pt will continue to benefit from skilled outpatient occupational therapy to address the deficits listed in the problem list on initial evaluation provide pt/family education and to maximize pt's level of independence in the home and community environment.     Anticipated barriers to occupational therapy: Arthritis, Diabetes    Pt's spiritual, cultural and educational needs considered and pt agreeable to plan of care and goals.    Goals:  Short Term Goals: (in 2 weeks-07/02/19)  1) Patient will be independent in HEP  2) Decrease pain in B wrist/thumbs to no more than 3-4/10 worst during ADL/IADL's   3) Increase AROM in L wrist flex & UD by 5 degrees for improved functioning in ADL/IADL's  4) Increase JASSO L thumb by 10 degrees for improved functioning in ADL/IADL's  5) Increase B  strength by 5-8 psi for increased functional use  6) Decrease edema in B wrist by .2-.3 cm      Long Term Goals: (in 6 weeks-08/02/19)  1) Decrease pain in B wrist/thumb to no more than  1-2/10 worst with light to moderate ADL/IADL's   2) Increase AROM of B wrist/thumb to WFL for increased functioning in ADL/IADL's  3) Increase strength in B  by 30% of initial measures for improved functioning in ADL/IADL's  4) Increased functioning in ADL/IADL's, as evidenced by a FOTO impairment rating of no more than 30%  5) Decrease edema in B wrist to trace or none       Plan     Certification Period/Plan of care expiration: 6/19/2019 to 08/02/19.     Outpatient Occupational Therapy 2 times weekly for 6 weeks to include the following interventions: Paraffin, Manual therapy/joint mobilizations, Modalities for pain management, US 3 mhz, Therapeutic exercises/activities., Strengthening, Orthotic Fabrication/Fit/Training, Edema Control, Electrical Modalities and Joint Protection.       Pratik Veloz, OT

## 2019-07-01 NOTE — PATIENT INSTRUCTIONS
Insulin 70/30  Breakfast: 100 units for normal meal, 110 units for big meal  Lunch: 80 units with normal meal, 90 units large meal  Dinner: 65 units for normal meal, 70 for large meal

## 2019-07-01 NOTE — PROGRESS NOTES
Subjective:      Patient ID: Annika Mac is a 68 y.o. female.    Chief Complaint:  Diabetes    History of Present Illness  Annika Mac presents today for follow up of T2DM    Previously seen by Ursula Salinas, new to me     Since last visit has had multiple admissions, one for symptomatic hyperglycemia and then severe hypoglycemia last week  Has also been changed from U500 to 70/30 due to cost    With regards to the diabetes:    Diagnosed: . Started oral agents then NPH and Regular insulin. Converted to MDI with Lantus and Novolog in 2006 after knee surgery. D/c TZD r/t weight gain  and added Symlin. Stopped Symlin . Januvia added 2/10. D/C Januvia 12/10 r/t cost; resumed though pt assistance in . Converted to U500 in 7/10.     Known complications:  DKA-  RN-  PN-  Nephropathy-    Current regimen:  70/30 110/100/90 units  Victoza 1.8 mg daily    Glucose Monitor:  4-6 times a day testing  Log reviewed:   Fastin, 174, 218, 125, 166, 170, 44  Lunch: 259, 130, 209, 179, 144, 251, 167, 34  Dinner: 273, 240, 223, 140, 147, 178, 208  Bedtie: 150, 142, 176, 93, 313    Hypoglycemia:  19: Glucose 34 around 1:30 PM and unresponsive at that time. It ws 44 when she woke up that morning and then corrected to 85. Did not eat again after that and took no insulin   Last insulin dose the night before, thinks she ate usual dinner    She is taking insulin AFTER meals    Denies missed doses.  Has Medicare Extra Help     Eats 3 meals daily, + snacks  Drinks water, unsweet tea.   No formal exercise.    Education - last visit: 2019    Diabetes Management Status  Statin: Taking  ACE/ARB: Taking  Screening or Prevention Patient's value Goal Complete/Controlled?   HgA1C Testing and Control   Lab Results   Component Value Date    HGBA1C 9.9 (H) 2019      Annually/Less than 8% No   Lipid profile : 2019 Annually Yes   LDL control Lab Results   Component Value Date    LDLCALC 83.4 2019     Annually/Less than 100 mg/dl  Yes   Nephropathy screening Lab Results   Component Value Date    LABMICR 27.0 05/01/2019     Lab Results   Component Value Date    PROTEINUA Negative 05/23/2019    Annually Yes   Blood pressure BP Readings from Last 1 Encounters:   07/01/19 (!) 140/58    Less than 140/90 Yes   Dilated retinal exam : 10/30/2018 Annually Yes   Foot exam   : 05/18/2018 Annually Yes     With regards to hyperparathyroidism:  Results for [facility] (MRN [id number]) as of 5/6/2019 17:07   Ref. Range 10/26/2017 10:37 3/13/2018 11:32 2/13/2019 16:20   PTH Latest Ref Range: 9.0 - 77.0 pg/mL 107.0 (H)  82.0 (H)   Results for [facility] (MRN [id number]) as of 5/6/2019 17:07   Ref. Range 4/4/2019 10:45 4/9/2019 09:48 5/1/2019 09:40   Calcium Latest Ref Range: 8.7 - 10.5 mg/dL 10.1 9.9 9.2   Albumin Latest Ref Range: 3.5 - 5.2 g/dL 3.7 3.6 3.3 (L)   Results for [facility] (MRN [id number]) as of 5/6/2019 17:07   Ref. Range 2/13/2019 16:20   Vit D, 25-Hydroxy Latest Ref Range: 30 - 96 ng/mL 40     BMD 6/9/17  Normal spine and hip BMD. FRAX calculation does not support treatment for osteoporosis. total hip BMD declined 3% since 2008 and 10% since 2002. Lumbar spine BMD unchanged.  Recommendations:  1) Adequate calcium and Vitamin D therapy  2) Appropriate exercise  3) No need to repeat BMD in near future unless clinical change    Review of Systems   Constitutional: Negative for unexpected weight change.   Eyes: Negative for visual disturbance.   Respiratory: Negative for shortness of breath.    Cardiovascular: Negative for chest pain.   Gastrointestinal: Negative for abdominal pain.   Endocrine: Negative for cold intolerance, heat intolerance, polydipsia, polyphagia and polyuria.   Musculoskeletal: Negative for arthralgias.   Skin: Negative for wound.   Neurological: Negative for headaches.   Hematological: Does not bruise/bleed easily.   Psychiatric/Behavioral: Negative for sleep disturbance.     Objective:   Physical  Exam   Neck: No thyromegaly present.   Cardiovascular: Normal rate.   No edema present   Pulmonary/Chest: Effort normal.   Abdominal: Soft.   Vitals reviewed.  Appropriate footwear  No ulcers or wounds.   injection sites are ok. No lipo hypertropthy or atrophy    Body mass index is 51.23 kg/m².    Lab Review:   Lab Results   Component Value Date    HGBA1C 9.9 (H) 05/01/2019     Lab Results   Component Value Date    CHOL 153 03/26/2019    HDL 40 03/26/2019    LDLCALC 83.4 03/26/2019    TRIG 148 03/26/2019    CHOLHDL 26.1 03/26/2019     Lab Results   Component Value Date     05/26/2019    K 4.3 05/26/2019     05/26/2019    CO2 24 05/26/2019     (H) 05/26/2019    BUN 21 05/26/2019    CREATININE 1.6 (H) 05/26/2019    CALCIUM 10.0 05/26/2019    PROT 7.8 05/23/2019    ALBUMIN 4.3 05/23/2019    BILITOT 0.5 05/23/2019    ALKPHOS 157 (H) 05/23/2019    AST 29 05/23/2019    ALT 22 05/23/2019    ANIONGAP 7 (L) 05/26/2019    ESTGFRAFRICA 37.6 (A) 05/26/2019    EGFRNONAA 32.6 (A) 05/26/2019    TSH 1.423 05/01/2019     Assessment and Plan     1. Type 2 diabetes mellitus with diabetic polyneuropathy, with long-term current use of insulin  glucagon, human recombinant, (GLUCAGON EMERGENCY KIT, HUMAN,) 1 mg SolR    GLUCOSE MONITORING CONTINUOUS MIN 72 HOURS   2. Hypoglycemia unawareness associated with type 2 diabetes mellitus     3. Hyperglycemia     4. Stage 4 chronic kidney disease     5. Morbid obesity due to excess calories       T2DM with hyper and hypoglycemia  Recent ER visit due to hypoglycemia with loss of consciousness, unawareness  Significant insulin resistance with difficult to control glucose  Did well with U500 but unfortunately not able to afford at this time so on 70/30    New Regimen:  Insulin 70/30:  Breakfast: 100 units for normal meal, 110 units for big meal  Lunch: 80 units with normal meal, 90 units large meal  Dinner: 65 units for normal meal, 70 for large meal    Victoza 1.8 mg  daily    Schedule professional CGM  Will try to get Dexcom given significant glucose variability with recent episode of hypoglycemia with loss of consciousness      Stage 4 CKD  Contributes to risk for hypoglycemia  Glycemic control as above  Cont to follow with nephrology    Morbid obesity  Significant insulin resistance  Discussed dietary modification    RTC as scheduled with Ursula Salinas 8/8/19    Avril Waite MD

## 2019-07-03 ENCOUNTER — TELEPHONE (OUTPATIENT)
Dept: ORTHOPEDICS | Facility: CLINIC | Age: 69
End: 2019-07-03

## 2019-07-03 ENCOUNTER — OFFICE VISIT (OUTPATIENT)
Dept: ORTHOPEDICS | Facility: CLINIC | Age: 69
End: 2019-07-03
Payer: MEDICARE

## 2019-07-03 ENCOUNTER — HOSPITAL ENCOUNTER (OUTPATIENT)
Dept: RADIOLOGY | Facility: HOSPITAL | Age: 69
Discharge: HOME | End: 2019-07-03
Attending: PHYSICIAN ASSISTANT
Payer: MEDICARE

## 2019-07-03 ENCOUNTER — OFFICE VISIT (OUTPATIENT)
Dept: PHYSICAL MEDICINE AND REHAB | Facility: CLINIC | Age: 69
End: 2019-07-03
Payer: MEDICARE

## 2019-07-03 VITALS
DIASTOLIC BLOOD PRESSURE: 64 MMHG | SYSTOLIC BLOOD PRESSURE: 124 MMHG | BODY MASS INDEX: 48.82 KG/M2 | WEIGHT: 293 LBS | HEART RATE: 80 BPM | HEIGHT: 65 IN

## 2019-07-03 DIAGNOSIS — E11.43 DIABETIC AUTONOMIC NEUROPATHY ASSOCIATED WITH TYPE 2 DIABETES MELLITUS: ICD-10-CM

## 2019-07-03 DIAGNOSIS — Z79.891 OPIOID USE AGREEMENT EXISTS: ICD-10-CM

## 2019-07-03 DIAGNOSIS — M54.5 LOW BACK PAIN, UNSPECIFIED BACK PAIN LATERALITY, UNSPECIFIED CHRONICITY, WITH SCIATICA PRESENCE UNSPECIFIED: ICD-10-CM
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
## 2019-07-03 DIAGNOSIS — M54.16 LUMBAR RADICULOPATHY: ICD-10-CM

## 2019-07-03 DIAGNOSIS — M54.40 CHRONIC LOW BACK PAIN WITH SCIATICA, SCIATICA LATERALITY UNSPECIFIED, UNSPECIFIED BACK PAIN LATERALITY: Primary | ICD-10-CM

## 2019-07-03 DIAGNOSIS — M47.816 LUMBAR FACET ARTHROPATHY: ICD-10-CM

## 2019-07-03 DIAGNOSIS — R26.81 GAIT INSTABILITY: ICD-10-CM

## 2019-07-03 DIAGNOSIS — G63 POLYNEUROPATHY ASSOCIATED WITH UNDERLYING DISEASE: ICD-10-CM

## 2019-07-03 DIAGNOSIS — M48.061 SPINAL STENOSIS OF LUMBAR REGION, UNSPECIFIED WHETHER NEUROGENIC CLAUDICATION PRESENT: ICD-10-CM

## 2019-07-03 DIAGNOSIS — M43.10 SPONDYLOLISTHESIS, UNSPECIFIED SPINAL REGION: Primary | ICD-10-CM

## 2019-07-03 DIAGNOSIS — M48.062 SPINAL STENOSIS OF LUMBAR REGION WITH NEUROGENIC CLAUDICATION: ICD-10-CM

## 2019-07-03 DIAGNOSIS — G89.29 CHRONIC LOW BACK PAIN WITH SCIATICA, SCIATICA LATERALITY UNSPECIFIED, UNSPECIFIED BACK PAIN LATERALITY: Primary | ICD-10-CM

## 2019-07-03 PROCEDURE — 99214 OFFICE O/P EST MOD 30 MIN: CPT | Mod: S$PBB,,, | Performed by: PHYSICAL MEDICINE & REHABILITATION

## 2019-07-03 PROCEDURE — 72120 X-RAY BEND ONLY L-S SPINE: CPT | Mod: 26,,, | Performed by: RADIOLOGY

## 2019-07-03 PROCEDURE — 72120 XR LUMBAR SPINE AP AND LAT WITH FLEX/EXT: ICD-10-PCS | Mod: 26,,, | Performed by: RADIOLOGY

## 2019-07-03 PROCEDURE — 99999 PR PBB SHADOW E&M-EST. PATIENT-LVL III: CPT | Mod: PBBFAC,,, | Performed by: PHYSICAL MEDICINE & REHABILITATION

## 2019-07-03 PROCEDURE — 99213 OFFICE O/P EST LOW 20 MIN: CPT | Mod: PBBFAC,25,27 | Performed by: PHYSICAL MEDICINE & REHABILITATION

## 2019-07-03 PROCEDURE — 72100 X-RAY EXAM L-S SPINE 2/3 VWS: CPT | Mod: 26,,, | Performed by: RADIOLOGY

## 2019-07-03 PROCEDURE — 99999 PR PBB SHADOW E&M-EST. PATIENT-LVL III: ICD-10-PCS | Mod: PBBFAC,,, | Performed by: PHYSICAL MEDICINE & REHABILITATION

## 2019-07-03 PROCEDURE — 99214 PR OFFICE/OUTPT VISIT, EST, LEVL IV, 30-39 MIN: ICD-10-PCS | Mod: S$PBB,,, | Performed by: PHYSICAL MEDICINE & REHABILITATION

## 2019-07-03 PROCEDURE — 72100 XR LUMBAR SPINE AP AND LAT WITH FLEX/EXT: ICD-10-PCS | Mod: 26,,, | Performed by: RADIOLOGY

## 2019-07-03 PROCEDURE — 99214 PR OFFICE/OUTPT VISIT, EST, LEVL IV, 30-39 MIN: ICD-10-PCS | Mod: S$PBB,,, | Performed by: PHYSICIAN ASSISTANT

## 2019-07-03 PROCEDURE — 72120 X-RAY BEND ONLY L-S SPINE: CPT | Mod: TC

## 2019-07-03 PROCEDURE — 99214 OFFICE O/P EST MOD 30 MIN: CPT | Mod: S$PBB,,, | Performed by: PHYSICIAN ASSISTANT

## 2019-07-03 PROCEDURE — 99214 OFFICE O/P EST MOD 30 MIN: CPT | Mod: PBBFAC,25 | Performed by: PHYSICIAN ASSISTANT

## 2019-07-03 PROCEDURE — 99999 PR PBB SHADOW E&M-EST. PATIENT-LVL IV: CPT | Mod: PBBFAC,,, | Performed by: PHYSICIAN ASSISTANT

## 2019-07-03 PROCEDURE — 99999 PR PBB SHADOW E&M-EST. PATIENT-LVL IV: ICD-10-PCS | Mod: PBBFAC,,, | Performed by: PHYSICIAN ASSISTANT

## 2019-07-03 RX ORDER — TRAMADOL HYDROCHLORIDE 50 MG/1
50 TABLET ORAL EVERY 8 HOURS PRN
Qty: 90 TABLET | Refills: 3 | Status: SHIPPED | OUTPATIENT
Start: 2019-07-03 | End: 2019-10-30 | Stop reason: SDUPTHER

## 2019-07-03 NOTE — PROGRESS NOTES
"Subjective:       Patient ID: Annika Mac is a 68 y.o. female.    Chief Complaint: Back Pain and wrists pain    Back Pain   Associated symptoms include leg pain. Pertinent negatives include no abdominal pain, chest pain, fever, numbness or weakness. Headaches:     Elbow Pain   Pertinent negatives include no abdominal pain, arthralgias, chest pain, chills, fatigue, fever, joint swelling, myalgias, numbness, rash or weakness. Headaches:     Arm Pain    Pertinent negatives include no chest pain or numbness.   Knee Pain    Pertinent negatives include no numbness.   Leg Pain    Pertinent negatives include no numbness.   Patient is 69 y/o female , who returns to clinic for chronic back and leg pain.   LCV  02/13/19.     Today, main pains are:    #1 back pain, leg pain,   #2 Left shoulder pain ( Ortho f/u, and pt is going to PT for shoulder).     Current back pain is  3,  worst pain is 7/10, the best is 3-4 with meds  #1 back pain, leg pain,   Current back pain is 0,  worst pain are7/10, best is 3-4.  Pain is present at all time, accross lower back in midline of tail bone.   Back pain is more dull pain, and leg pain is more shooting pain art the top of thighs, and bellow the knee.   Pain is present daytime, and radiates to both legs on front of legs.  Cannot walk more than 50 ft nor she can stand straight > 5 minutes.   She has to lean forward even with cane or during walking, needs to sit down to rest.  Back pain increases, and leg become weak.   She has diabetic neuropathy in both feet, top and bottom, tingling pain.  Does not have "charilie horses".   Sitting down helps a little bit, and lying down  ( on side, or stomach).   She failed Neurontin 300 mg  for maybe 6 yrs, and takes 3 pills a day, that does not help much.   And , Gabapentin is eventually changed to Lyrica 100 mg that she takes daily (approved by insurance) , and pain in hands is gone. Tolerates Lyrica well.   She has been getting NAYLA since 2011,by  " Kai and Geronimo with NAYLA, with some pain relief.   She had a second B/l L5/S1 TF NAYLA on 16, and she reported that helped a lot > 80% and lasted for only 2 weeks.   First  B/l L5/S1 TF NAYLA 16, with  > 50% pain improvement.  On LCV, she was given Hydrocodone , and that in combination with Gabapentin, helped greatly, decreases pain to tolerable 2-3.  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   She is using for gait: SC, manual WC, and RW.  Here for follow up, and treatment.     Past Medical History:   Diagnosis Date    Allergy     Anemia 2012    Arthritis     CHF (congestive heart failure)     CKD (chronic kidney disease) stage 3, GFR 30-59 ml/min 2012    Clotting disorder     Colon polyp     Deep vein thrombophlebitis of left leg 2012    Degenerative disc disease     Diabetic peripheral neuropathy associated with type 2 diabetes mellitus 2012    GERD (gastroesophageal reflux disease)     HTN (hypertension) 2012    Hyperlipidemia 2012    Lead-induced gout of ankle or foot     right going on three weeks    Nonproliferative diabetic retinopathy of right eye 2012    Obstructive sleep apnea 2012    Osteoporosis     Proliferative diabetic retinopathy of left eye 2012    Stroke 2003    Type 2 diabetes mellitus with diabetic polyneuropathy 2012    Ulcer        Past Surgical History:   Procedure Laterality Date    CATARACT EXTRACTION W/  INTRAOCULAR LENS IMPLANT Left 3/2002    left     CATARACT EXTRACTION W/  INTRAOCULAR LENS IMPLANT Right     OD dr. olivares     SECTION      COLONOSCOPY N/A 2018    [facility] (2ND F[facility])    CYST REMOVAL  2011    left side of face    ESOPHAGOGASTRODUODENOSCOPY (EGD) N/A 2018    Per[facility] at [facility] END[facility] (2ND FLR)    EYE SURGERY Bilateral 2012    eye implants     GANGLION CYST EXCISION  11/13/2007    Right wrist    INSERTION, IOL PROSTHESIS Right 12/19/2012    Performed by [facility] at [facility] OR 1ST FLR    Pan Retinal Photocoagulation Bilateral     Dr. Monterroso (Proliferative Diabetic Retinopathy)    PHACOEMULSIFICATION, CATARACT Right 12/19/2012    Per[facility] 1ST FLR    TOTAL ABDOMINAL HYSTERECTOMY  1982    TOTAL KNEE ARTHROPLASTY  2/2006    left    TRIGGER FINGER RELEASE  9/18/2009       Family History   Problem Relation Age of Onset    Diabetes Mother     Hypertension Mother     Heart disease Father     Diabetes Sister     Thyroid disease Brother     Diabetes Brother     Hypertension Brother     No Known Problems Daughter     No Known Problems Son     No Known Problems Daughter     Amblyopia Neg Hx     Blindness Neg Hx     Glaucoma Neg Hx     Macular degeneration Neg Hx     Retinal detachment Neg Hx     Strabismus Neg Hx     Colon cancer Neg Hx     Esophageal cancer Neg Hx        Social History     Socioeconomic History    Marital status:      Spouse name: Heber    Number of children: 3    Years of education: Not on file    Highest education level: Not on file   Occupational History    Not on file   Social Needs    Financial resource strain: Not on file    Food insecurity:     Worry: Not on file     Inability: Not on file    Transportation needs:     Medical: Not on file     Non-medical: Not on file   Tobacco Use    Smoking status: Never Smoker    Smokeless tobacco: Never Used   Substance and Sexual Activity    Alcohol use: No    Drug use: No    Sexual activity: Yes     Partners: Male   Lifestyle    Physical activity:     Days per week: Not on file     Minutes per session: Not on file    Stress: Not on file   Relationships    Social connections:     Talks on phone: Not on file     Gets together: Not on file     Attends Islam service: Not on file     Active member of club or  "organization: Not on file     Attends meetings of clubs or organizations: Not on file     Relationship status: Not on file   Other Topics Concern    Not on file   Social History Narrative    , lives with family; 3 children, 2 grandchildren       Current Outpatient Medications   Medication Sig Dispense Refill    allopurinol (ZYLOPRIM) 300 MG tablet Take 1 tablet (300 mg total) by mouth once daily. 90 tablet 3    amLODIPine (NORVASC) 10 MG tablet Take 1 tablet (10 mg total) by mouth once daily. 90 tablet 3    aspirin 81 MG Chew Take 1 tablet (81 mg total) by mouth once daily.  0    BD ULTRA-FINE KIKA PEN NEEDLE 32 gauge x 5/32" Ndle To use 4 times daily 200 each 20    blood sugar diagnostic Strp 1 each by Misc.(Non-Drug; Combo Route) route 4 (four) times daily. Dx code  E11.42 . Pt use one touch verio. 200 each 12    blood-glucose meter kit Use as instructed, true test/result meter 1 each 0    blood-glucose meter kit 1 each by Other route once daily. Use daily. Insurance proffered one touch  E11.42 1 each 0    colchicine (COLCRYS) 0.6 mg tablet Take 0.6 mg by mouth as needed.      DULoxetine (CYMBALTA) 30 MG capsule Take 1 capsule (30 mg total) by mouth once daily. 90 capsule 3    famotidine (PEPCID) 20 MG tablet Take 1 tablet (20 mg total) by mouth 2 (two) times daily. 1 tablet 0    fexofenadine (ALLEGRA) 180 MG tablet TAKE 1 TABLET BY MOUTH ONCE DAILY 30 tablet 0    finasteride (PROSCAR) 5 mg tablet TAKE 1 TABLET(5 MG) BY MOUTH EVERY DAY 90 tablet 1    fluocinonide (LIDEX) 0.05 % external solution AAA scalp qday - bid prn pruritus 60 mL 3    furosemide (LASIX) 20 MG tablet Take 1 tablet (20 mg total) by mouth once daily. Take an extra pill if short of breath or leg swelling 120 tablet 3    glucagon, human recombinant, (GLUCAGON EMERGENCY KIT, HUMAN,) 1 mg SolR Inject 1 mg into the muscle as needed. 1 each 3    insulin NPH-insulin regular, 70/30, (NOVOLIN 70/30) 100 unit/mL (70-30) injection " 120 units with breakfast, 110 units with lunch, and 90 units with dinner;  10 mL 11    insulin regular 100 unit/mL Inj injection Inject 120 Units into the skin 3 (three) times daily before meals. 120 units with breakfast, 110 units with lunch, and 90 units with dinner;       irbesartan (AVAPRO) 300 MG tablet Take 1 tablet (300 mg total) by mouth every evening. 90 tablet 3    ketoconazole (NIZORAL) 2 % shampoo Wash hair with medicated shampoo at least 2x/week - let sit on scalp at least 5 minutes prior to rinsing 120 mL 5    labetalol (NORMODYNE) 300 MG tablet Take 1 tablet (300 mg total) by mouth 2 (two) times daily. 180 tablet 3    lancets 31 gauge Misc 1 lancet by Misc.(Non-Drug; Combo Route) route 4 (four) times daily. E11.42 . Prescribed one touch 200 each 6    lancets 33 gauge Misc 1 lancet by Misc.(Non-Drug; Combo Route) route 4 (four) times daily. Dx code E11.42 200 each 12    lancets Misc by Misc.(Non-Drug; Combo Route) route.      liraglutide 0.6 mg/0.1 mL, 18 mg/3 mL, subq PNIJ (VICTOZA 3-MILAN) 0.6 mg/0.1 mL (18 mg/3 mL) PnIj Inject 1.2 mg into the skin once daily. 3 mL 6    multivitamin (THERAGRAN) per tablet Take 1 tablet by mouth once daily.      pregabalin (LYRICA) 150 MG capsule Take 1 capsule (150 mg total) by mouth 2 (two) times daily. 60 capsule 5    simvastatin (ZOCOR) 40 MG tablet Take 0.5 tablets (20 mg total) by mouth every evening. 90 tablet 4    traMADol (ULTRAM) 50 mg tablet Take 1 tablet (50 mg total) by mouth every 8 (eight) hours as needed. 90 tablet 3    triamcinolone acetonide 0.1% (KENALOG) 0.1 % ointment AAA bid hands 60 g 1    TRUETEST TEST STRIPS Strp 1 strip by Misc.(Non-Drug; Combo Route) route 4 (four) times daily. Dx code E11.42. Prescribed cover by insurance. 400 strip 4     No current facility-administered medications for this visit.        Review of patient's allergies indicates:   Allergen Reactions    Iodinated contrast media - oral and iv dye  Rash     Review of Systems   Constitutional: Negative.  Negative for appetite change, chills, fatigue, fever and unexpected weight change.   HENT: Negative.  Negative for drooling, trouble swallowing and voice change.    Eyes: Negative.  Negative for pain and visual disturbance.   Respiratory: Negative.  Negative for shortness of breath and wheezing.    Cardiovascular: Negative.  Negative for chest pain and palpitations.   Gastrointestinal: Negative.  Negative for abdominal distention, abdominal pain, constipation and diarrhea.   Genitourinary: Negative.  Negative for difficulty urinating.   Musculoskeletal: Positive for back pain. Negative for arthralgias, gait problem, joint swelling, myalgias and neck stiffness.               Skin: Negative.  Negative for color change and rash.   Neurological: Negative.  Negative for dizziness, facial asymmetry, speech difficulty, weakness, light-headedness and numbness. Headaches:     Hematological: Negative for adenopathy.   Psychiatric/Behavioral: Negative.  Negative for behavioral problems, confusion and sleep disturbance. The patient is not nervous/anxious.          Objective:      Physical Exam    GENERAL: The patient is alert, oriented, pleasant.   HEENT; PERRLA  NECK: supple   MUSCULOSKELETAL:   Gait: slow james , on wide basis, using RW.   Cervical spine :  Minimally decreased AROM in cervical spine, flexion to 60, extension to  0, side bending and rotation  to 30-35 degrees without pain.  No paravertebral cervical musculature tenderness    No  tenderness in upper trapezius muscles    Lumbar spine, full range of motion in all planes, flexion to 90 degrees , ext. 0.  Side bending and rotation to 35-40 degrees.   Straight leg raising Negative bilaterally.   Full range of motion in all joints x4 extremities, except in RT knee, that is very painful at pattellar lower pole/border.  Appears high riding patella, hat is maybe  lateray displaced,  Muscle strength 5/5 throughout  x4 extremities.   No  joint laxity throughout x4 extremities.   NEUROLOGIC: Cranial nerves II through XII intact.   Deep tendon reflexes is normal, +2 in the upper and lower extremities bilaterally.   Muscle tone is normal.   Sensory is intact to light touch and pinprick throughout x4 extremities.     MRI of Lumbar spine showed:  T12-L1:  There is no focal disc herniation.  No significant spinal canal narrowing.    No significant neural foraminal narrowing.  L1-2:  There is no focal disc herniation.  No significant spinal canal narrowing.    No significant neural foraminal narrowing.  L2-3:  There is no focal disc herniation.  No significant spinal canal narrowing.    No significant neural foraminal narrowing.  L3-4:  There is a minimal circumferential disk bulge and mild bilateral facet arthropathy which results in no significant spinal canal or neural foraminal narrowing.  L4-5:    There is circumferential disk bulge (eccentric to the left), moderate bilateral hypertrophic facet arthropathy, and ligamentum flavum thickening which results in mild spinal canal narrowing and no significant neural foraminal narrowing.      L5-S1:  There is circumferential disk bulge and severe hypertrophic facet arthropathy   with ligamentum flavum hypertrophy which results in mild spinal canal narrowing, moderate left neural foraminal narrowing, and mild right neural foraminal narrowing.i  Impression:   multilevel degenerative changes of the lumbar spine consisting of circumferential disk bulges, hypertrophic facet arthropathy, and ligamentum flavum hypertrophy   which result in mild spinal canal narrowing at L4-5  and L5-S1 as well as moderate left and mild right neural foraminal narrowing at L5-S1.      Assessment:       1. Chronic low back pain with sciatica, sciatica laterality unspecified, unspecified back pain laterality    2. Spinal stenosis of lumbar region with neurogenic claudication    3. Lumbar facet arthropathy    4.  Diabetic autonomic neuropathy associated with type 2 diabetes mellitus    5. Gait instability    6. Spinal stenosis of lumbar region, unspecified whether neurogenic claudication present    7. Fall (on)(from) incline, sequela    8. Polyneuropathy associated with underlying disease    9. Opioid use agreement exists      Plan:       Chronic low back pain with sciatica, sciatica laterality unspecified, unspecified back pain laterality  -     traMADol (ULTRAM) 50 mg tablet; Take 1 tablet (50 mg total) by mouth every 8 (eight) hours as needed.  Dispense: 90 tablet; Refill: 3    Spinal stenosis of lumbar region with neurogenic claudication  -     traMADol (ULTRAM) 50 mg tablet; Take 1 tablet (50 mg total) by mouth every 8 (eight) hours as needed.  Dispense: 90 tablet; Refill: 3    Lumbar facet arthropathy  -     traMADol (ULTRAM) 50 mg tablet; Take 1 tablet (50 mg total) by mouth every 8 (eight) hours as needed.  Dispense: 90 tablet; Refill: 3    Diabetic autonomic neuropathy associated with type 2 diabetes mellitus  -     traMADol (ULTRAM) 50 mg tablet; Take 1 tablet (50 mg total) by mouth every 8 (eight) hours as needed.  Dispense: 90 tablet; Refill: 3    Gait instability  -     traMADol (ULTRAM) 50 mg tablet; Take 1 tablet (50 mg total) by mouth every 8 (eight) hours as needed.  Dispense: 90 tablet; Refill: 3    Spinal stenosis of lumbar region, unspecified whether neurogenic claudication present  -     traMADol (ULTRAM) 50 mg tablet; Take 1 tablet (50 mg total) by mouth every 8 (eight) hours as needed.  Dispense: 90 tablet; Refill: 3    Fall (on)(from) incline, sequela  -     traMADol (ULTRAM) 50 mg tablet; Take 1 tablet (50 mg total) by mouth every 8 (eight) hours as needed.  Dispense: 90 tablet; Refill: 3    Polyneuropathy associated with underlying disease  -     traMADol (ULTRAM) 50 mg tablet; Take 1 tablet (50 mg total) by mouth every 8 (eight) hours as needed.  Dispense: 90 tablet; Refill: 3    Opioid use  agreement exists  -     traMADol (ULTRAM) 50 mg tablet; Take 1 tablet (50 mg total) by mouth every 8 (eight) hours as needed.  Dispense: 90 tablet; Refill: 3      Patient with chronic low back pain, secondary to lumbar spondylosis, possible radiculopathy, cx with morbid obesity.   Also with gait instability secondary to severe DJD of Rt knee.     1. Pain management.  Will resume  Hydrocodone to 10/325 mg, anngd Gabapentin is changed to Lyrica, that is helpi,  Taking Lyrica 100 mg bid.  ( approved by insurance, and pain in hands is gone),   Increased  Cymbalta to 30 mg bid.    Opioid Risk Score     None         reviewed and appropriate.  Hydrocodone refills on 12/24, 11/20, 10/11/18.  Lyrica 150 mg bid, refills (#180 tabs/3 mo), refills on 10/16/18.  UDS ordered on 10/11/18.        RTC in 3-4 months.    Total time spent face to face with patient was 25 minutes.   More than 50% of that time was spent in counseling on diagnosis , prognosis and treatment options.   I also  patient  on common and most usual side effect of prescribed medications.   Risk and benefits of opiates, possible risk of developing opiate dependence and tolerance, need of strict compliance with prescribed medications.  Pain contract, rules and obligations were discussed with patient in details.  She is aware that I would be the only doctor prescribing her pain medications and ED in a case of emergency.  I reviewed Primary care , and other specialty's notes to better coordinate patient's  care.   All questions were answered, and patient voiced understanding.                                                                                                                                                                        .

## 2019-07-03 NOTE — PROGRESS NOTES
DATE: 7/3/2019  PATIENT: Annika Mac    Supervising Physician: Felipe Owusu M.D.    CHIEF COMPLAINT: back pain    HISTORY:  Annika Mac is a 68 y.o. female previously seen by me for her neck here for initial evaluation of low back and bilateral posterior leg pain. The pain has been present for several months.  She has had similar pain in the past.  She had ESis in 2016 with good relief. She saw Dr. Barrientos for the same pain this morning.  The patient describes the pain as aching.  The pain is worse with walking and improved by sitting. There is associated numbness and tingling. There is subjective weakness. Prior treatments have included medications, but no physical therapy, recent ESIs or surgery.    The patient denies myelopathic symptoms such as handwriting changes or difficulty with buttons/coins/keys. Denies perineal paresthesias, bowel/bladder dysfunction.    PAST MEDICAL/SURGICAL HISTORY:  Past Medical History:   Diagnosis Date    Allergy     Anemia 7/27/2012    Arthritis     CHF (congestive heart failure)     CKD (chronic kidney disease) stage 3, GFR 30-59 ml/min 7/27/2012    Clotting disorder     Colon polyp     Deep vein thrombophlebitis of left leg 7/27/2012    Degenerative disc disease     Diabetic peripheral neuropathy associated with type 2 diabetes mellitus 7/27/2012    GERD (gastroesophageal reflux disease)     HTN (hypertension) 7/27/2012    Hyperlipidemia 7/27/2012    Lead-induced gout of ankle or foot     right going on three weeks    Nonproliferative diabetic retinopathy of right eye 7/27/2012    Obstructive sleep apnea 7/27/2012    Osteoporosis     Proliferative diabetic retinopathy of left eye 7/27/2012    Stroke 8/1/2003    Type 2 diabetes mellitus with diabetic polyneuropathy 7/27/2012    Ulcer      Past Surgical History:   Procedure Laterality Date    CATARACT EXTRACTION W/  INTRAOCULAR LENS IMPLANT Left 3/2002    left     CATARACT EXTRACTION W/   "INTRAOCULAR LENS IMPLANT Right     OD dr. olivares     SECTION      COLONOSCOPY N/A 2018    [facility] (2ND FLR)    CYST REMOVAL  2011    left side of face    ESOPHAGOGASTRODUODENOSCOPY (EGD) N/A 2018    Performed by [facility] at [facility] END[facility] (2ND FLR)    EYE SURGERY Bilateral 2012    eye implants    GANGLION CYST EXCISION  2007    Right wrist    INSERTION, IOL PROSTHESIS Right 2012    Performed by Linda [facility] at [facility] OR 1ST FLR    Pan Retinal Photocoagulation Bilateral     Dr. Monterroso (Proliferative Diabetic Retinopathy)    PHACOEMULSIFICATION, CATARACT Right 2012    Performed [facility] at [facility] OR 1ST FLR    TOTAL ABDOMINAL HYSTERECTOMY  1982    TOTAL KNEE ARTHROPLASTY  2006    left    TRIGGER FINGER RELEASE  2009       Medications:   Current Outpatient Medications on File Prior to Visit   Medication Sig Dispense Refill    allopurinol (ZYLOPRIM) 300 MG tablet Take 1 tablet (300 mg total) by mouth once daily. 90 tablet 3    amLODIPine (NORVASC) 10 MG tablet Take 1 tablet (10 mg total) by mouth once daily. 90 tablet 3    aspirin 81 MG Chew Take 1 tablet (81 mg total) by mouth once daily.  0    BD ULTRA-FINE KIKA PEN NEEDLE 32 gauge x 5/32" Ndle To use 4 times daily 200 each 20    blood sugar diagnostic Strp 1 each by Misc.(Non-Drug; Combo Route) route 4 (four) times daily. Dx code  E11.42 . Pt use one touch verio. 200 each 12    blood-glucose meter kit Use as instructed, true test/result meter 1 each 0    blood-glucose meter kit 1 each by Other route once daily. Use daily. Insurance proffered one touch  E11.42 1 each 0    colchicine (COLCRYS) 0.6 mg tablet Take 0.6 mg by mouth as needed.      DULoxetine (CYMBALTA) 30 MG capsule Take 1 capsule (30 mg total) by mouth once daily. 90 capsule 3    famotidine (PEPCID) 20 MG tablet Take 1 tablet (20 mg total) by mouth 2 (two) times " daily. 1 tablet 0    fexofenadine (ALLEGRA) 180 MG tablet TAKE 1 TABLET BY MOUTH ONCE DAILY 30 tablet 0    finasteride (PROSCAR) 5 mg tablet TAKE 1 TABLET(5 MG) BY MOUTH EVERY DAY 90 tablet 1    fluocinonide (LIDEX) 0.05 % external solution AAA scalp qday - bid prn pruritus 60 mL 3    furosemide (LASIX) 20 MG tablet Take 1 tablet (20 mg total) by mouth once daily. Take an extra pill if short of breath or leg swelling 120 tablet 3    glucagon, human recombinant, (GLUCAGON EMERGENCY KIT, HUMAN,) 1 mg SolR Inject 1 mg into the muscle as needed. 1 each 3    insulin NPH-insulin regular, 70/30, (NOVOLIN 70/30) 100 unit/mL (70-30) injection 120 units with breakfast, 110 units with lunch, and 90 units with dinner;  10 mL 11    insulin regular 100 unit/mL Inj injection Inject 120 Units into the skin 3 (three) times daily before meals. 120 units with breakfast, 110 units with lunch, and 90 units with dinner;       irbesartan (AVAPRO) 300 MG tablet Take 1 tablet (300 mg total) by mouth every evening. 90 tablet 3    ketoconazole (NIZORAL) 2 % shampoo Wash hair with medicated shampoo at least 2x/week - let sit on scalp at least 5 minutes prior to rinsing 120 mL 5    labetalol (NORMODYNE) 300 MG tablet Take 1 tablet (300 mg total) by mouth 2 (two) times daily. 180 tablet 3    lancets 31 gauge Misc 1 lancet by Misc.(Non-Drug; Combo Route) route 4 (four) times daily. E11.42 . Prescribed one touch 200 each 6    lancets 33 gauge Misc 1 lancet by Misc.(Non-Drug; Combo Route) route 4 (four) times daily. Dx code E11.42 200 each 12    lancets Misc by Misc.(Non-Drug; Combo Route) route.      liraglutide 0.6 mg/0.1 mL, 18 mg/3 mL, subq PNIJ (VICTOZA 3-MILAN) 0.6 mg/0.1 mL (18 mg/3 mL) PnIj Inject 1.2 mg into the skin once daily. 3 mL 6    multivitamin (THERAGRAN) per tablet Take 1 tablet by mouth once daily.      pregabalin (LYRICA) 150 MG capsule Take 1 capsule (150 mg total) by mouth 2 (two) times daily. 60  capsule 5    simvastatin (ZOCOR) 40 MG tablet Take 0.5 tablets (20 mg total) by mouth every evening. 90 tablet 4    traMADol (ULTRAM) 50 mg tablet Take 1 tablet (50 mg total) by mouth every 8 (eight) hours as needed. 90 tablet 3    triamcinolone acetonide 0.1% (KENALOG) 0.1 % ointment AAA bid hands 60 g 1    TRUETEST TEST STRIPS Strp 1 strip by Misc.(Non-Drug; Combo Route) route 4 (four) times daily. Dx code E11.42. Prescribed cover by insurance. 400 strip 4    [DISCONTINUED] insulin glargine, TOUJEO, (TOUJEO SOLOSTAR) 300 unit/mL (1.5 mL) InPn pen INJECT 40 UNITS INTO THE SKIN EVERY EVENING TITRATE UP AS DIRECTED. 4.5 mL 0     No current facility-administered medications on file prior to visit.        Social History:   Social History     Socioeconomic History    Marital status:      Spouse name: Heber    Number of children: 3    Years of education: Not on file    Highest education level: Not on file   Occupational History    Not on file   Social Needs    Financial resource strain: Not on file    Food insecurity:     Worry: Not on file     Inability: Not on file    Transportation needs:     Medical: Not on file     Non-medical: Not on file   Tobacco Use    Smoking status: Never Smoker    Smokeless tobacco: Never Used   Substance and Sexual Activity    Alcohol use: No    Drug use: No    Sexual activity: Yes     Partners: Male   Lifestyle    Physical activity:     Days per week: Not on file     Minutes per session: Not on file    Stress: Not on file   Relationships    Social connections:     Talks on phone: Not on file     Gets together: Not on file     Attends [employer] service: Not on file     Active member of club or organization: Not on file     Attends meetings of clubs or organizations: Not on file     Relationship status: Not on file   Other Topics Concern    Not on file   Social History Narrative    , lives with family; 3 children, 2 grandchildren       REVIEW OF  SYSTEMS:  Constitution: Negative. Negative for chills, fever and night sweats.   Cardiovascular: Negative for chest pain and syncope.   Respiratory: Negative for cough and shortness of breath.   Gastrointestinal: See HPI. Negative for nausea/vomiting. Negative for abdominal pain.  Genitourinary: See HPI. Negative for discoloration or dysuria.  Skin: Negative for dry skin, itching and rash.   Hematologic/Lymphatic: Negative for bleeding problem. Does not bruise/bleed easily.   Musculoskeletal: Negative for falls and muscle weakness.   Neurological: See HPI. No seizures.   Endocrine: Negative for polydipsia, polyphagia and polyuria.   Allergic/Immunologic: Negative for hives and persistent infections.     EXAM:  There were no vitals taken for this visit.    General: The patient is a pleasant 68 y.o. female in no apparent distress, the patient is oriented to person, place and time.  Psych: Normal mood and affect  HEENT: Vision grossly intact, hearing intact to the spoken word.  Lungs: Respirations unlabored.  Gait: Antalgic station and gait.  She ambulates with a Rolator.   Skin: Dorsal lumbar skin negative for rashes, lesions, hairy patches and surgical scars. There is mild lumbar tenderness to palpation.  Range of motion: Lumbar range of motion is acceptable.  Spinal Balance: Global saggital and coronal spinal balance acceptable, not significant for scoliosis and kyphosis.  Musculoskeletal: No pain with the range of motion of the bilateral hips. No trochanteric tenderness to palpation.  Vascular: Bilateral lower extremities warm and well perfused, dorsalis pedis pulses 2+ bilaterally.  Neurological: Normal strength and tone in all major motor groups in the bilateral lower extremities. Normal sensation to light touch in the L2-S1 dermatomes bilaterally.  Deep tendon reflexes symmetric 1+ in the bilateral lower extremities.  Negative Babinski bilaterally. Straight leg raise negative bilaterally.    IMAGING:      Today  I personally reviewed AP, Lat and Flex/Ex  upright L-spine films that demonstrate grade I anterolisthesis at L4/5 and L5/S1 disc degeneration.       There is no height or weight on file to calculate BMI.    Hemoglobin A1C   Date Value Ref Range Status   05/01/2019 9.9 (H) 4.0 - 5.6 % Final     Comment:     ADA Screening Guidelines:  5.7-6.4%  Consistent with prediabetes  >or=6.5%  Consistent with diabetes  High levels of fetal hemoglobin interfere with the HbA1C  assay. Heterozygous hemoglobin variants (HbS, HgC, etc)do  not significantly interfere with this assay.   However, presence of multiple variants may affect accuracy.     01/02/2019 9.3 (H) 4.0 - 5.6 % Final     Comment:     ADA Screening Guidelines:  5.7-6.4%  Consistent with prediabetes  >or=6.5%  Consistent with diabetes  High levels of fetal hemoglobin interfere with the HbA1C  assay. Heterozygous hemoglobin variants (HbS, HgC, etc)do  not significantly interfere with this assay.   However, presence of multiple variants may affect accuracy.     06/08/2018 9.6 (H) 4.0 - 5.6 % Final     Comment:     ADA Screening Guidelines:  5.7-6.4%  Consistent with prediabetes  >or=6.5%  Consistent with diabetes  High levels of fetal hemoglobin interfere with the HbA1C  assay. Heterozygous hemoglobin variants (HbS, HgC, etc)do  not significantly interfere with this assay.   However, presence of multiple variants may affect accuracy.             ASSESSMENT/PLAN:    Diagnoses and all orders for this visit:    Spondylolisthesis, unspecified spinal region  -     X-Ray Lumbar Spine Ap Lateral w/Flex Ext; Future  -     Ambulatory Referral to Physical/Occupational Therapy    Lumbar radiculopathy  -     Ambulatory Referral to Physical/Occupational Therapy        Given the patient's poorly controlled diabetes (A1c is 9.9), I do not think ESIs are safe at this point.  Referral for PT placed today.  Pain medication as prescribed by Dr. Barrientos.  Follow up as needed.        Follow  up if symptoms worsen or fail to improve.

## 2019-07-05 ENCOUNTER — TELEPHONE (OUTPATIENT)
Dept: INTERNAL MEDICINE | Facility: CLINIC | Age: 69
End: 2019-07-05

## 2019-07-05 NOTE — TELEPHONE ENCOUNTER
----- Message from Giselle Lacey sent at 7/5/2019  8:28 AM CDT -----  Contact: [phone]  Type: Orders Request    What orders/ testing are being requested?   Routine labs    Is there a future appointment scheduled for the patient with PCP?  yes    When? 9-4-2019   Physical    Would you prefer a response via [employer]?    Comments:   Please advise, thank you

## 2019-07-07 DIAGNOSIS — E78.5 HYPERLIPIDEMIA, UNSPECIFIED HYPERLIPIDEMIA TYPE: Primary | ICD-10-CM

## 2019-07-08 ENCOUNTER — CLINICAL SUPPORT (OUTPATIENT)
Dept: REHABILITATION | Facility: HOSPITAL | Age: 69
End: 2019-07-08
Payer: MEDICARE

## 2019-07-08 DIAGNOSIS — M25.531 PAIN IN BOTH WRISTS: ICD-10-CM

## 2019-07-08 DIAGNOSIS — M25.632 JOINT STIFFNESS OF BOTH WRISTS: ICD-10-CM

## 2019-07-08 DIAGNOSIS — M25.532 PAIN IN BOTH WRISTS: ICD-10-CM

## 2019-07-08 DIAGNOSIS — M62.81 MUSCLE WEAKNESS: ICD-10-CM

## 2019-07-08 DIAGNOSIS — M25.631 JOINT STIFFNESS OF BOTH WRISTS: ICD-10-CM

## 2019-07-08 PROCEDURE — 97110 THERAPEUTIC EXERCISES: CPT | Mod: PO

## 2019-07-08 PROCEDURE — 97140 MANUAL THERAPY 1/> REGIONS: CPT | Mod: PO

## 2019-07-08 PROCEDURE — 97018 PARAFFIN BATH THERAPY: CPT | Mod: PO,59

## 2019-07-08 NOTE — PROGRESS NOTES
Occupational Therapy Daily Treatment Note     Date: 7/8/2019  Name: Annika Mac  Clinic Number: [id number]    Therapy Diagnosis:   Encounter Diagnoses   Name Primary?    Pain in both wrists     Joint stiffness of both wrists     Muscle weakness      Physician: Marky Hagen MD    Physician Orders: Eval & Treat  Medical Diagnosis: B de Quervain's  Surgical Procedure and Date: None  Evaluation Date: 06/19/19  Insurance Authorization Period Expiration: 06/13/2020  Plan of Care Certification Period: 06/19/19 to 08/02/19  Date of Return to MD: 07/24/19      Visit # / Visits authorized: 5 / 50  Time In:1:00 pm  Time Out: 2:00 pm   Total Billable Time: 60 minutes (P, 1MT, 2TE)    Precautions:  Standard and Diabetes      Subjective     Pt reports:  She continues to wear B pre-ling orthotics as recommended (throughout the day & night, removing only for hygiene and baths)  she was compliant with home exercise program given last session.   Response to previous treatment:No adverse reactions  Functional change: able to use both wrist/hands in light self-care tasks with splints donned and less pain; able to sleep through the night with less pain    Pain: R=6/10 L=7/10 pre-tx  Location: bilateral wrists/hands      Objective     Annika received the following modalities after being cleared for contradictions for 10 minutes:   -Paraffin w/ MHP to R wrist/hand, pre-tx to decrease pain & increase tissue extensibility, simultaneously with  -3.0 MHz, .4 W/cm2, 50% pulsed, 10 min to L wrist, to decrease pain & edema, increase circulation and tissue extensibility     Annika received the following manual therapy techniques for 20 minutes:   -Edema mgmt w/ lymphatic drainage technique;  Deep STM, including MFR, CFM, TPR & scar mgmt to B forearm/wrist/hands, using hand techniques and IASTM tools to increase blood flow/circulation, improve soft tissue pliability and decrease pain.  -Kinesiotapng, for de Quervain's taping pattern B'ly     Annika  received therapeutic exercises for 30 minutes including:    AROM B Wrist/Hands    forearm sup/pron 5 reps    wrist E/F, RD/UD 5 reps each    thumb Rad Abd 5 reps    thumb/pinky slides 5 reps        Dexterciser  3 min   Isospheres 3 min   9 Peg  1 trial, R supinated, L pronated    Geary Manipulation (not today) 1 trial       T-putty: yellow    -Molding 2 min each   -grasp/manipulation 3 reps each       Home Exercises and Education Provided     Education provided:   - Reviewed HEP    Written Home Exercises Provided: Patient instructed to cont prior HEP.  Exercises were reviewed and Annika was able to demonstrate them prior to the end of the session.  Annika demonstrated good  understanding of the HEP provided.     See EMR under Patient Instructions for exercises provided prior visit.        Assessment     Patient is tolerating treatment well. She is making steady progress towards goals set for increased functional use in light ADL's with use of B thumb spica splints.  Edema continues to decrease in B wrists, but L wrist continues to have more edema that R wrist. She is able to sleep through the night with less        Annika is progressing well towards her goals and there are no updates to goals at this time. Pt prognosis is Good.     Pt will continue to benefit from skilled outpatient occupational therapy to address the deficits listed in the problem list on initial evaluation provide pt/family education and to maximize pt's level of independence in the home and community environment.     Anticipated barriers to occupational therapy: Arthritis, Diabetes    Pt's spiritual, cultural and educational needs considered and pt agreeable to plan of care and goals.    Goals:  Short Term Goals: (in 2 weeks-07/02/19)  1) Patient will be independent in HEP  2) Decrease pain in B wrist/thumbs to no more than 3-4/10 worst during ADL/IADL's   3) Increase AROM in L wrist flex & UD by 5 degrees for improved functioning in ADL/IADL's  4)  Increase JASSO L thumb by 10 degrees for improved functioning in ADL/IADL's  5) Increase B  strength by 5-8 psi for increased functional use  6) Decrease edema in B wrist by .2-.3 cm      Long Term Goals: (in 6 weeks-08/02/19)  1) Decrease pain in B wrist/thumb to no more than 1-2/10 worst with light to moderate ADL/IADL's   2) Increase AROM of B wrist/thumb to WFL for increased functioning in ADL/IADL's  3) Increase strength in B  by 30% of initial measures for improved functioning in ADL/IADL's  4) Increased functioning in ADL/IADL's, as evidenced by a FOTO impairment rating of no more than 30%  5) Decrease edema in B wrist to trace or none       Plan     Certification Period/Plan of care expiration: 6/19/2019 to 08/02/19.     Outpatient Occupational Therapy 2 times weekly for 6 weeks to include the following interventions: Paraffin, Manual therapy/joint mobilizations, Modalities for pain management, US 3 mhz, Therapeutic exercises/activities., Strengthening, Orthotic Fabrication/Fit/Training, Edema Control, Electrical Modalities and Joint Protection.       Pratik Veloz, OT

## 2019-07-09 ENCOUNTER — TELEPHONE (OUTPATIENT)
Dept: PHYSICAL MEDICINE AND REHAB | Facility: CLINIC | Age: 69
End: 2019-07-09

## 2019-07-09 NOTE — TELEPHONE ENCOUNTER
Called pharmacy and answered their questions.    ----- Message from Odalis Paulino sent at 7/9/2019  3:46 PM CDT -----  Contact: pt @ [phone]  Calling regarding her pharmacy needing additional information before filling her prescription for traMADol (ULTRAM) 50 mg tablet. Please call    [facility] [address] [place] [phone] [facility] [phone] [facility] [address]  [facility] [address]  Phone: [phone] Fax: [phone]

## 2019-07-15 ENCOUNTER — CLINICAL SUPPORT (OUTPATIENT)
Dept: REHABILITATION | Facility: HOSPITAL | Age: 69
End: 2019-07-15
Payer: MEDICARE

## 2019-07-15 DIAGNOSIS — M25.532 PAIN IN BOTH WRISTS: ICD-10-CM

## 2019-07-15 DIAGNOSIS — Z74.09 DECREASED FUNCTIONAL MOBILITY AND ENDURANCE: ICD-10-CM

## 2019-07-15 DIAGNOSIS — M62.81 MUSCLE WEAKNESS: ICD-10-CM

## 2019-07-15 DIAGNOSIS — M25.531 PAIN IN BOTH WRISTS: ICD-10-CM

## 2019-07-15 DIAGNOSIS — M25.632 JOINT STIFFNESS OF BOTH WRISTS: ICD-10-CM

## 2019-07-15 DIAGNOSIS — R26.9 GAIT ABNORMALITY: ICD-10-CM

## 2019-07-15 DIAGNOSIS — M25.631 JOINT STIFFNESS OF BOTH WRISTS: ICD-10-CM

## 2019-07-15 DIAGNOSIS — R29.3 POSTURE IMBALANCE: ICD-10-CM

## 2019-07-15 DIAGNOSIS — R29.898 WEAKNESS OF BOTH LOWER EXTREMITIES: ICD-10-CM

## 2019-07-15 PROCEDURE — 97163 PT EVAL HIGH COMPLEX 45 MIN: CPT | Mod: PO

## 2019-07-15 PROCEDURE — 97140 MANUAL THERAPY 1/> REGIONS: CPT | Mod: PO

## 2019-07-15 PROCEDURE — 97110 THERAPEUTIC EXERCISES: CPT | Mod: PO

## 2019-07-15 PROCEDURE — 97018 PARAFFIN BATH THERAPY: CPT | Mod: PO,59

## 2019-07-15 NOTE — PLAN OF CARE
[facility]  Physical Therapy Initial Evaluation    Name: Annika Mac  Clinic Number: [id number]    Therapy Diagnosis:   Encounter Diagnoses   Name Primary?    Weakness of both lower extremities     Decreased functional mobility and endurance     Gait abnormality     Posture imbalance      Physician: Irena Berger PA-C    Physician Orders: PT Eval and Treat: Lumbar protocol with home exercises. ROM, stretching lumbar and abdominal musculature. Aerobic condidtioning, aqua therapy , with modalities including ultrasound, massage, dry needling PRN. 3 X week for 6 weeks.  Medical Diagnosis from Referral: Spondylolisthesis, unspecified spinal region; Lumbar radiculopathy  Evaluation Date: 7/15/2019  Authorization Period Expiration: 07/02/2020  Plan of Care Expiration: 09/15/19  Visit # / Visits authorized: 1/ 1    Time In: 8:05  Time Out: 8:55am  Total Billable Time: 55 minutes    Precautions: Standard    Subjective   Date of onset: Chronic with acute flare up June 4, 2019   History of current condition - Brewster reports: she has been dealing with back pain over the years, but it most recently experienced an increased in pain after sustaining a fall on June 4, 2019. Patient reports she was using a rolling walker while ambulating on a solid surface. Patient reports the floor was uneven and her walker went into a dip in the floor causing the fall. Patient was rushed to the ER following the fall. Reports using pain medication and heat for pain management, however, heat does not help the most. Patient reports experiencing intermittent numbness and tingling that travels down the back of the legs and stops mid-calf.      Medical History:   Past Medical History:   Diagnosis Date    Allergy     Anemia 7/27/2012    Arthritis     CHF (congestive heart failure)     CKD (chronic kidney disease) stage 3, GFR 30-59 ml/min 7/27/2012    Clotting disorder     Colon polyp     Deep vein  thrombophlebitis of left leg 2012    Degenerative disc disease     Diabetic peripheral neuropathy associated with type 2 diabetes mellitus 2012    GERD (gastroesophageal reflux disease)     HTN (hypertension) 2012    Hyperlipidemia 2012    Lead-induced gout of ankle or foot     right going on three weeks    Nonproliferative diabetic retinopathy of right eye 2012    Obstructive sleep apnea 2012    Osteoporosis     Proliferative diabetic retinopathy of left eye 2012    Stroke 2003    Type 2 diabetes mellitus with diabetic polyneuropathy 2012    Ulcer        Surgical History:   Annika Mac  has a past surgical history that includes Total knee arthroplasty (2006); Total abdominal hysterectomy (); Ganglion cyst excision (2007); Cyst Removal (2011); Trigger finger release (2009);  section; Cataract extraction w/  intraocular lens implant (Left, 3/2002); Cataract extraction w/  intraocular lens implant (Right, ); Pan Retinal Photocoagulation (Bilateral); Eye surgery (Bilateral, ); and Colonoscopy (N/A, 2018).    Medications:   Annika has a current medication list which includes the following prescription(s): amlodipine, aspirin, bd ultra-fine myesha pen needle, blood sugar diagnostic, blood-glucose meter, blood-glucose meter, colchicine, duloxetine, famotidine, fexofenadine, finasteride, fluocinonide, furosemide, glucagon (human recombinant), insulin nph-insulin regular (70/30), insulin regular, irbesartan, ketoconazole, labetalol, lancets, lancets, lancets, liraglutide 0.6 mg/0.1 ml (18 mg/3 ml) subq pnij, multivitamin, pregabalin, simvastatin, tramadol, triamcinolone acetonide 0.1%, truetest test strips, and insulin glargine (toujeo).    Allergies:   Review of patient's allergies indicates:   Allergen Reactions    Iodinated contrast- oral and iv dye Rash        Imaging, Radioraph:   Alignment: There is grade 1  anterolisthesis of L4 on L5, slightly increased on flexion views.    Vertebrae: Vertebral body heights are maintained.    There is a well-circumscribed sclerotic focus in the anterior superior aspect of L3 vertebral body, which appears to be slowly enlarging when compared to prior studies.    Discs and facets: There is disc space height loss, most pronounced at T12-L1, L1-L2, and L5-S1.  There is facet hypertrophy at L5-S1.    Miscellaneous: No additional findings.      Impression       Degenerative changes with grade 1 anterolisthesis of L5 on S1.    Well-circumscribed L3 sclerotic focus, which has slightly increased in size since 2014; however favored to represent a benign process such as an enlarging bone island/enostosis.       Prior Therapy: not for lumbar   Social History: lives with their spouse  Occupation: retired  Prior Level of Function: independent with AD   Current Level of Function: independent with AD     Pain:  Current 5/10, worst 8/10, best 5/10   Location: bilateral back   Description: Tingling and Numb  Aggravating Factors: Sitting, Standing, Laying, Bending and Walking  Easing Factors: rest    Pts goals: decrease pain, increase strength and flexibility in order to regain PLOF.     Objective     Observation: patient ambulated into clinic using SPC donned in R hand. Gait is antalgic with slow cadance and slight display of trendelenburg; increased lumbar lordosis due to anteriorly rotated pelvis     Posture:  R illiac crest higher than L     Lumbar Range of Motion:    Degrees Pain   Flexion 20   yes        Extension 10   Yes        Left Side Bending 5 yes        Right Side Bending 5 yes             Lower Extremity Strength  Right LE  Left LE    Knee extension: 4/5 Knee extension: 4+/5   Knee flexion: 4/5 Knee flexion: 4+/5   Hip flexion: 3+/5 Hip flexion: 3+/5   Hip abduction: 3+/5 Hip abduction: 3+/5   Hip adduction: 4-/5 Hip adduction 4-/5       Special Tests:  -Repeated Flexion: positive    -Repeated Ext: positive   -Piriformis Test: moderate   -Bridge Test: positive for increased pain     Neuro Dynamic Testing:    Sciatic nerve:      SLR: R = negative       L = negative       Palpation: unremarkable to palpation however patient reports pain is usual at the lowest part of her back      Sensation: intact     Flexibility:   Hamstring test: R = 35 degrees ; L = 35 degrees    CMS Impairment/Limitation/Restriction for FOTO Lumbar Survey    Therapist reviewed FOTO scores for Annika Mac on 7/15/2019.   FOTO documents entered into [employer] - see Media section.    Limitation Score: 54%  Category: Mobility    Current : CK = at least 40% but < 60% impaired, limited or restricted  Goal: CJ = at least 20% but < 40% impaired, limited or restricted         TREATMENT   Treatment Time In: 8:05 am  Treatment Time Out: 8:55  Total Treatment time separate from Evaluation: 10/45 minutes    Annika received therapeutic exercises to develop strength, endurance, ROM, flexibility and core stabilization for 10 minutes including: HEP Review and Development     Home Exercises and Patient Education Provided    Education provided:   - sleeping with pillows between her legs for proper alignment     Written Home Exercises Provided: Patient instructed to cont prior HEP.  Exercises were reviewed and Annika was able to demonstrate them prior to the end of the session.  Annika demonstrated good  understanding of the education provided.     See EMR under Patient Instructions for exercises provided 7/15/2019.    Assessment   Annika is a 68 y.o. female referred to outpatient Physical Therapy with a medical diagnosis of Spondylolisthesis, unspecified spinal region; Lumbar radiculopathy. Pt presents with LBP with radiculopathy, that has been getting worse. Annika has been experiencing chronic LBP however it most recently increased due to a fall she sustained on June 4. Patient demonstrates slow antalgic and slight trendelenburg gait pattern, while using  SPC. Patient also demonstrates deficits in ROM, strength, and flexibility. Increased pain levels are also increasing difficulty of bed mobility and ADL performance. Lumbar AROM presented as painful in all directions, however a sharp increase in pain was experienced with Lumbar extension. Lumbar AROM painful and patient unable to perform motions without coupling, I.e R sidebend was accompanied with left rotation. Motions were slow and heavily guarded. Patient was also unable to maintain supine position for long periods of time due to sharp increases in pain while lying flat, even in hookline position. Patient is moderately limited in ADLs due to pain.  Muscle weakness a long with poor endurance was also noted throughout the treatment session. Patient also presents with increased lumbar lordosis which is suspected to be causing increased symptoms of the spondylolisthesis     Pt prognosis is Fair.   Pt will benefit from skilled outpatient Physical Therapy to address the deficits stated above and in the chart below, provide pt/family education, and to maximize pt's level of independence.     Plan of care discussed with patient: Yes  Pt's spiritual, cultural and educational needs considered and patient is agreeable to the plan of care and goals as stated below:     Anticipated Barriers for therapy: none    Medical Necessity is demonstrated by the following  History  Co-morbidities and personal factors that may impact the plan of care Co-morbidities:   COPD/asthma, diabetes, high BMI and history of CVA    Personal Factors:   no deficits     high   Examination  Body Structures and Functions, activity limitations and participation restrictions that may impact the plan of care Body Regions:   back    Body Systems:    gross symmetry  ROM  strength  balance  gait  transfers    Participation Restrictions:   none    Activity limitations:   Learning and applying knowledge  no deficits    General Tasks and Commands  no  deficits    Communication  no deficits    Mobility  lifting and carrying objects  walking  moving around using equipment (WC)  using transportation (bus, train, plane, car)    Self care  washing oneself (bathing, drying, washing hands)  caring for body parts (brushing teeth, shaving, grooming)  dressing    Domestic Life  shopping  cooking  doing house work (cleaning house, washing dishes, laundry)    Interactions/Relationships  no deficits    Life Areas  no deficits    Community and Social Life  community life  recreation and leisure         high   Clinical Presentation evolving clinical presentation with changing clinical characteristics high   Decision Making/ Complexity Score: high     Goals:  Short Term Goals: 4 weeks   Patient will be able to resume supine position for 30 minutes, when in bed in order to get a full night of rest.   Patient will be able to stand for 30 mins with min-no pain/difficulty in order to cook and clean dishes.   Increase (B) Hamstring flexibility a 10 degrees in order to reduce stress of low back.     Long Term Goals: 8 weeks   Patient will be able to ambulated for desired distance with min-no pain/difficulty using rolling walker.   Patient will be able to resume supine position for 1 hour, when in bed in order to get a full night of rest.   Patient will be able to sit for 1 hour with min-no difficulty in order to watch movie with her family.   Patient will be able to stand for 1 hour with min-no pain/difficulty in order to cook and clean dishes.     Plan   Plan of care Certification: 7/15/2019 to 09/15/19.    Outpatient Physical Therapy 2 times weekly for 8 weeks to include the following interventions: Gait Training, Manual Therapy, Moist Heat/ Ice, Neuromuscular Re-ed, Patient Education, Therapeutic Activites and Therapeutic Exercise.     Muriel Cotton, PT

## 2019-07-15 NOTE — PATIENT INSTRUCTIONS
Lower Trunk Rotation Stretch        Keeping back flat and feet together, rotate knees to left side. Hold 5 seconds.  Repeat 10 times per set. Do 1 sets per session. Perform on days you do not attend therapy.   [email or web address]/122     Copyright © [employer]. All rights reserved.   Lower Back Stretch (Sitting)        Sit in chair with knees spread apart. Bend forward to floor. A comfortable stretch should be felt in lower back. Hold 5-10 seconds.  Repeat 10 times per set. Do 1 sets per session. Perform on days you do not attend therapy.      https://MADS/124     Copyright © [employer]. All rights reserved.   Hamstring Stretch        Reach down along right leg until a comfortable stretch is felt in back of thigh. Be sure to keep knee straight. Hold 10 seconds.  Repeat 10 times per set. Do 1 sets per session. Perform on days you do not attend therapy.      [email or web address]     Copyright © [employer]ix. All rights reserved.

## 2019-07-15 NOTE — PROGRESS NOTES
"  Occupational Therapy Daily Treatment Note     Date: 7/15/2019  Name: Annika Mac  Clinic Number: [id number]    Therapy Diagnosis:   Encounter Diagnoses   Name Primary?    Pain in both wrists     Joint stiffness of both wrists     Muscle weakness      Physician: Marky Hagen MD    Physician Orders: Eval & Treat  Medical Diagnosis: B de Quervain's  Surgical Procedure and Date: None  Evaluation Date: 06/19/19  Insurance Authorization Period Expiration: 06/13/2020  Plan of Care Certification Period: 06/19/19 to 08/02/19  Date of Return to MD: 07/24/19      Visit # / Visits authorized: 6 / 50  Time In:9:05 pm  Time Out: 10:00 pm   Total Billable Time: 40 minutes (P, 1MT, 1TE)    Precautions:  Standard and Diabetes      Subjective     Pt reports:  "I'm having a little bit more pain today, mostly on the left. The swelling came back on the left"  she was compliant with home exercise program given last session.   Response to previous treatment:No adverse reactions  Functional change: able to use both wrist/hands in light self-care tasks with splints donned and less pain; able to sleep through the night with less pain    Pain: R=3-4/10;  L=5-6/10 pre-tx  Location: bilateral wrists/hands      Objective     Annika received the following supervised modalities after being cleared for contradictions for 10 minutes:   -Paraffin w/ MHP to R wrist/hand, pre-tx to decrease pain & increase tissue extensibility    Annika received the following manual therapy techniques for 15 minutes:   -Edema mgmt w/ lymphatic drainage technique;  Deep STM, including MFR, CFM, TPR & scar mgmt to B forearm/wrist/hands, using hand techniques and IASTM tools to increase blood flow/circulation, improve soft tissue pliability and decrease pain.  -Kinesiotapng, for de Quervain's taping pattern B'ly     Annika received therapeutic exercises for 15 minutes direct care and 15 minutes of supervised  including:    Dexterciser  3 min   Isospheres 3 min   9 Peg  3 " trial, R supinated, L pronated        T-putty: yellow    -Molding 3 min each   -grasp/manipulation 3 reps each   -log rolls 1 log each       Home Exercises and Education Provided     Education provided:   - Reviewed HEP    Written Home Exercises Provided: Patient instructed to cont prior HEP.  Exercises were reviewed and Annika was able to demonstrate them prior to the end of the session.  Annika demonstrated good  understanding of the HEP provided.     See EMR under Patient Instructions for exercises provided prior visit.        Assessment     Patient tolerated treatment well today. Her R wrist/hand is steadily improving, however, her L wrist/hand continues to be more problematic, as she continues to have more pain and edema on the L side.         Annika is progressing well towards her goals and there are no updates to goals at this time. Pt prognosis is Good.     Pt will continue to benefit from skilled outpatient occupational therapy to address the deficits listed in the problem list on initial evaluation provide pt/family education and to maximize pt's level of independence in the home and community environment.     Anticipated barriers to occupational therapy: Arthritis, Diabetes    Pt's spiritual, cultural and educational needs considered and pt agreeable to plan of care and goals.    Goals:  Short Term Goals: (in 2 weeks-07/02/19)  1) Patient will be independent in HEP  2) Decrease pain in B wrist/thumbs to no more than 3-4/10 worst during ADL/IADL's   3) Increase AROM in L wrist flex & UD by 5 degrees for improved functioning in ADL/IADL's  4) Increase JASSO L thumb by 10 degrees for improved functioning in ADL/IADL's  5) Increase B  strength by 5-8 psi for increased functional use  6) Decrease edema in B wrist by .2-.3 cm      Long Term Goals: (in 6 weeks-08/02/19)  1) Decrease pain in B wrist/thumb to no more than 1-2/10 worst with light to moderate ADL/IADL's   2) Increase AROM of B wrist/thumb to WFL for  increased functioning in ADL/IADL's  3) Increase strength in B  by 30% of initial measures for improved functioning in ADL/IADL's  4) Increased functioning in ADL/IADL's, as evidenced by a FOTO impairment rating of no more than 30%  5) Decrease edema in B wrist to trace or none       Plan     Certification Period/Plan of care expiration: 6/19/2019 to 08/02/19.     Outpatient Occupational Therapy 2 times weekly for 6 weeks to include the following interventions: Paraffin, Manual therapy/joint mobilizations, Modalities for pain management, US 3 mhz, Therapeutic exercises/activities., Strengthening, Orthotic Fabrication/Fit/Training, Edema Control, Electrical Modalities and Joint Protection.       Pratik Veloz, OT

## 2019-07-16 ENCOUNTER — TELEPHONE (OUTPATIENT)
Dept: ENDOCRINOLOGY | Facility: CLINIC | Age: 69
End: 2019-07-16

## 2019-07-17 ENCOUNTER — CLINICAL SUPPORT (OUTPATIENT)
Dept: REHABILITATION | Facility: HOSPITAL | Age: 69
End: 2019-07-17
Payer: MEDICARE

## 2019-07-17 ENCOUNTER — TELEPHONE (OUTPATIENT)
Dept: ENDOCRINOLOGY | Facility: CLINIC | Age: 69
End: 2019-07-17

## 2019-07-17 ENCOUNTER — CLINICAL SUPPORT (OUTPATIENT)
Dept: ENDOCRINOLOGY | Facility: CLINIC | Age: 69
End: 2019-07-17
Payer: MEDICARE

## 2019-07-17 DIAGNOSIS — R29.898 WEAKNESS OF BOTH LOWER EXTREMITIES: ICD-10-CM

## 2019-07-17 DIAGNOSIS — Z79.4 TYPE 2 DIABETES MELLITUS WITH DIABETIC POLYNEUROPATHY, WITH LONG-TERM CURRENT USE OF INSULIN: ICD-10-CM

## 2019-07-17 DIAGNOSIS — R29.3 POSTURE IMBALANCE: ICD-10-CM

## 2019-07-17 DIAGNOSIS — M25.531 PAIN IN BOTH WRISTS: ICD-10-CM

## 2019-07-17 DIAGNOSIS — M25.632 JOINT STIFFNESS OF BOTH WRISTS: ICD-10-CM

## 2019-07-17 DIAGNOSIS — E11.42 TYPE 2 DIABETES MELLITUS WITH DIABETIC POLYNEUROPATHY, WITH LONG-TERM CURRENT USE OF INSULIN: ICD-10-CM

## 2019-07-17 DIAGNOSIS — Z74.09 DECREASED FUNCTIONAL MOBILITY AND ENDURANCE: ICD-10-CM

## 2019-07-17 DIAGNOSIS — R26.9 GAIT ABNORMALITY: ICD-10-CM

## 2019-07-17 DIAGNOSIS — M25.532 PAIN IN BOTH WRISTS: ICD-10-CM

## 2019-07-17 DIAGNOSIS — M25.631 JOINT STIFFNESS OF BOTH WRISTS: ICD-10-CM

## 2019-07-17 DIAGNOSIS — M62.81 MUSCLE WEAKNESS: ICD-10-CM

## 2019-07-17 PROCEDURE — 97140 MANUAL THERAPY 1/> REGIONS: CPT | Mod: PO

## 2019-07-17 PROCEDURE — 97110 THERAPEUTIC EXERCISES: CPT | Mod: PO

## 2019-07-17 PROCEDURE — 97018 PARAFFIN BATH THERAPY: CPT | Mod: PO,59

## 2019-07-17 NOTE — PROGRESS NOTES
"  Physical Therapy Daily Treatment Note     Name: Annika Mac  Clinic Number: [id number]    Therapy Diagnosis:   Encounter Diagnoses   Name Primary?    Weakness of both lower extremities     Decreased functional mobility and endurance     Gait abnormality     Posture imbalance      Physician: Irena Berger PA-C    Visit Date: 7/17/2019    Physician Orders: PT Eval and Treat: Lumbar protocol with home exercises. ROM, stretching lumbar and abdominal musculature. Aerobic condidtioning, aqua therapy , with modalities including ultrasound, massage, dry needling PRN. 3 X week for 6 weeks.  Medical Diagnosis from Referral: Spondylolisthesis, unspecified spinal region; Lumbar radiculopathy  Evaluation Date: 7/15/2019  Authorization Period Expiration: 07/02/2020  Plan of Care Expiration: 09/15/19  Visit # / Visits authorized: 2/50    Time In: 10:05 am   Time Out: 0:50 am  Total Billable Time: 45 minutes    Precautions: Standard    Subjective     Pt reports: she is experiencing her usual LBP symptoms  She was compliant with home exercise program.  Response to previous treatment: sore  Functional change: n/a     Pain: 6/10  Location: bilateral back      Objective     Annika received therapeutic exercises to develop strength, endurance, ROM, flexibility and core stabilization for 30 minutes including:      Date  07/17/19   VISIT 2/10   POC EXP. DATE       09/15/19   VISIT AMOUNT  MEDICARE TOTAL 60.64  173.84   FACE-TO-FACE 08/15/19       TABLE:    HSS w/ strap 10 x 10"   Piriformis stretch 5 x 5"   Hip flexor stretch 3 x 10"   TA's 20 x 3"   LTR 2 x 10   Marching w/TA 2 x 10   SAQ 2 x 10   Hip abduction NT   Hip adduction NT       SEATED:    LAQs 2 x 10   Seated Hip Flex 2 x 10   HS curls 2 x 10       STANDING:    Tandem Walking in // 3L   Tandem Stance  2 x 30"   SL Balance  3 x 10"           Initials BJ       Home Exercises Provided and Patient Education Provided     Education provided:   - instructed on proper technique " when transitioning from supine to sit, when getting out of bed.    Written Home Exercises Provided: Patient instructed to cont prior HEP.  Exercises were reviewed and Annika was able to demonstrate them prior to the end of the session.  Annika demonstrated good  understanding of the education provided.      See EMR under Patient Instructions for exercises provided 7/15/2019.    Assessment     Annika completed the above exercise with verbal and tactile cueing to perform with proper form and correct technique. Patient experienced moderate difficulty with exercises due to increased pain and muscle fatigue. Patient did tolerate supine positioning with the head of the bed elevated well. Modifications were made to have patient perform most therex in sitting. However patient did complete some exercise in supine. Patient also demonstrated difficulty with balance activities, noting frequent LOB and increased pain with SLS. Patient required moderate verbal cueing during balance activities.      Annika is progressing well towards her goals.   Pt prognosis is Fair.     Pt will continue to benefit from skilled outpatient physical therapy to address the deficits listed in the problem list box on initial evaluation, provide pt/family education and to maximize pt's level of independence in the home and community environment.     Pt's spiritual, cultural and educational needs considered and pt agreeable to plan of care and goals.     Anticipated barriers to physical therapy: none    Goals:   Short Term Goals: 4 weeks   Patient will be able to resume supine position for 30 minutes, when in bed in order to get a full night of rest. IN PROGRESS  Patient will be able to stand for 30 mins with min-no pain/difficulty in order to cook and clean dishes. IN PROGRESS  Increase (B) Hamstring flexibility a 10 degrees in order to reduce stress of low back. IN PROGRESS     Long Term Goals: 8 weeks   Patient will be able to ambulated for desired distance  with min-no pain/difficulty using rolling walker. IN PROGRESS'  Patient will be able to resume supine position for 1 hour, when in bed in order to get a full night of rest. IN PROGRESS  Patient will be able to sit for 1 hour with min-no difficulty in order to watch movie with her family. IN PROGRESS  Patient will be able to stand for 1 hour with min-no pain/difficulty in order to cook and clean dishes. IN PROGRESS      Plan     Modify patient position to increase patient tolerance to therex.     Muriel Cotton, PT, DPT

## 2019-07-17 NOTE — TELEPHONE ENCOUNTER
----- Message from Jessi Paulino sent at 7/17/2019  2:34 PM CDT -----  Contact: Self [phone]  PT returned call.

## 2019-07-17 NOTE — PROGRESS NOTES
".DIABETES EDUCATOR NOTE   PLACEMENT OF FREESTYLE GORAN PRO SENSOR  CONTINOUS GLUCOSE MONITORING SYSTEM (CGMS)    Patient is here in clinic today for placement of continuous glucose monitoring sensor.      Each patient verified that they were here for CGMS procedure ordered by their provider and that they have a working glucose meter and supplies at home.   Patient will be provided with a Freestyle Goran Sensor and a copy of the Continuous Glucose Monitoring Patient Log to fill out during the study.   A detailed  explanation of Continuous Glucose Monitoring was  provided. Patient informed that this is a blind procedure and that they will not actually see the blood sugar tracing in real time.  Reviewed with patient the [employer] patient education handout called "Your Freestyle Goran Pro sensor: What you need to know" to review self-care during the study to avoid sensor loosening or removal ie... Bathing, Swimming, dressing, and exercising.   Instructed patient to check blood sugar using home glucometer and to record the following on provided patient log sheets:Blood sugar taken at home, Meals and snacks, Activity, and Diabetes medications taken and dosage    Patient was brought to a private location.  Arm for insertion was selected and prepared and allowed to dry. Glucose Sensor Serial Number [id number] was inserted to back of patient's LEFT upper arm.    The following forms  were given and reviewed in detail with patient and all questions answered.   · Continuous Glucose Monitoring Patient Log #[id number]  · [employer] Patient Handout "Your Freestyle Goran Pro Sensor: What you need to know"     Instructions held in a group: Time: 15 min   Insertion of sensor done individually in private:  Time: 5 minutes         "

## 2019-07-17 NOTE — PROGRESS NOTES
Occupational Therapy Daily Treatment Note     Date: 7/17/2019  Name: Annika Mac  Clinic Number: [id number]    Therapy Diagnosis:   Encounter Diagnoses   Name Primary?    Pain in both wrists     Joint stiffness of both wrists     Muscle weakness      Physician: Marky Hagen MD    Physician Orders: Eval & Treat  Medical Diagnosis: B de Quervain's  Surgical Procedure and Date: None  Evaluation Date: 06/19/19  Insurance Authorization Period Expiration: 06/13/2020  Plan of Care Certification Period: 06/19/19 to 08/02/19  Date of Return to MD: 07/24/19      Visit # / Visits authorized: 7 / 50  Time In:8:20 am  Time Out: 9:20 am   Total Billable Time: 45 minutes (P, 1MT, 1TE)    Precautions:  Standard and Diabetes      Subjective     Pt reports:  She feels condition is improving slightly in L wrist/hand, but has improved moderate to significantly in R wrist/hand  she was compliant with home exercise program given last session.   Response to previous treatment:No adverse reactions  Functional change: able to use both wrist/hands in light self-care tasks with splints donned and less pain; able to sleep through the night with less pain    Pain: R=2-3/10;  L=4-5/10 pre-tx  Location: bilateral wrists/hands      Objective     Annika received the following supervised modalities after being cleared for contradictions for 10 minutes:   -Paraffin w/ MHP to R wrist/hand, pre-tx to decrease pain & increase tissue extensibility    Annika received the following manual therapy techniques for 15 minutes:   -Edema mgmt w/ lymphatic drainage technique;  Deep STM, including MFR, CFM, TPR & scar mgmt to B forearm/wrist/hands, using hand techniques and IASTM tools to increase blood flow/circulation, improve soft tissue pliability and decrease pain.  -Kinesiotapng, for de Quervain's taping pattern B'ly     Annika received therapeutic exercises for 20 minutes direct care and 15 minutes of supervised  including:    Dexterciser  3 min    Isospheres 3 min   9 Peg  3 trial, R supinated, L pronated        T-putty: yellow    -Molding 3 min each   -grasp/manipulation 3 reps each   -log rolls 1 log each       Home Exercises and Education Provided     Education provided:   - Reviewed HEP    Written Home Exercises Provided: Patient instructed to cont prior HEP.  Exercises were reviewed and Annika was able to demonstrate them prior to the end of the session.  Annika demonstrated good  understanding of the HEP provided.     See EMR under Patient Instructions for exercises provided prior visit.        Assessment     Patient continues to tolerate treatment well and is making steady progress towards goals set at initial evaluation for increased B wrist/hand functioning.  She is tolerating treatment fairly well.          Annika is progressing well towards her goals and there are no updates to goals at this time. Pt prognosis is Good.     Pt will continue to benefit from skilled outpatient occupational therapy to address the deficits listed in the problem list on initial evaluation provide pt/family education and to maximize pt's level of independence in the home and community environment.     Anticipated barriers to occupational therapy: Arthritis, Diabetes    Pt's spiritual, cultural and educational needs considered and pt agreeable to plan of care and goals.    Goals:  Short Term Goals: (in 2 weeks-07/02/19)  1) Patient will be independent in HEP  2) Decrease pain in B wrist/thumbs to no more than 3-4/10 worst during ADL/IADL's   3) Increase AROM in L wrist flex & UD by 5 degrees for improved functioning in ADL/IADL's  4) Increase JASSO L thumb by 10 degrees for improved functioning in ADL/IADL's  5) Increase B  strength by 5-8 psi for increased functional use  6) Decrease edema in B wrist by .2-.3 cm      Long Term Goals: (in 6 weeks-08/02/19)  1) Decrease pain in B wrist/thumb to no more than 1-2/10 worst with light to moderate ADL/IADL's   2) Increase AROM of  B wrist/thumb to WFL for increased functioning in ADL/IADL's  3) Increase strength in B  by 30% of initial measures for improved functioning in ADL/IADL's  4) Increased functioning in ADL/IADL's, as evidenced by a FOTO impairment rating of no more than 30%  5) Decrease edema in B wrist to trace or none       Plan     Certification Period/Plan of care expiration: 6/19/2019 to 08/02/19.     Outpatient Occupational Therapy 2 times weekly for 6 weeks to include the following interventions: Paraffin, Manual therapy/joint mobilizations, Modalities for pain management, US 3 mhz, Therapeutic exercises/activities., Strengthening, Orthotic Fabrication/Fit/Training, Edema Control, Electrical Modalities and Joint Protection.       Pratik Veloz, OT

## 2019-07-18 ENCOUNTER — TELEPHONE (OUTPATIENT)
Dept: ENDOCRINOLOGY | Facility: CLINIC | Age: 69
End: 2019-07-18

## 2019-07-18 DIAGNOSIS — E11.42 TYPE 2 DIABETES MELLITUS WITH DIABETIC POLYNEUROPATHY, WITH LONG-TERM CURRENT USE OF INSULIN: ICD-10-CM

## 2019-07-18 DIAGNOSIS — E11.42 TYPE 2 DIABETES MELLITUS WITH DIABETIC POLYNEUROPATHY: ICD-10-CM

## 2019-07-18 DIAGNOSIS — Z79.4 TYPE 2 DIABETES MELLITUS WITH DIABETIC POLYNEUROPATHY, WITH LONG-TERM CURRENT USE OF INSULIN: ICD-10-CM

## 2019-07-18 RX ORDER — PEN NEEDLE, DIABETIC 31 GX5/16"
NEEDLE, DISPOSABLE MISCELLANEOUS
Qty: 200 EACH | Refills: 20 | Status: SHIPPED | OUTPATIENT
Start: 2019-07-18 | End: 2020-03-27 | Stop reason: SDUPTHER

## 2019-07-18 NOTE — TELEPHONE ENCOUNTER
----- Message from Ginette Rocha MA sent at 7/18/2019 12:15 PM CDT -----  Contact: Self/[phone]  Pt state she fell today and the She lost the GLUCOSE MONITOR and is unsure on what to do.

## 2019-07-18 NOTE — TELEPHONE ENCOUNTER
Spoke to pt whom stated that she fell on her way the her doctors office the morning want to know can it be replced informed pt that I can place it back on  she will come back to the clinic to have the CGM reinserted today

## 2019-07-22 ENCOUNTER — CLINICAL SUPPORT (OUTPATIENT)
Dept: REHABILITATION | Facility: HOSPITAL | Age: 69
End: 2019-07-22
Payer: MEDICARE

## 2019-07-22 DIAGNOSIS — M25.632 JOINT STIFFNESS OF BOTH WRISTS: ICD-10-CM

## 2019-07-22 DIAGNOSIS — R29.898 WEAKNESS OF BOTH LOWER EXTREMITIES: ICD-10-CM

## 2019-07-22 DIAGNOSIS — M25.631 JOINT STIFFNESS OF BOTH WRISTS: ICD-10-CM

## 2019-07-22 DIAGNOSIS — M62.81 MUSCLE WEAKNESS: ICD-10-CM

## 2019-07-22 DIAGNOSIS — M25.531 PAIN IN BOTH WRISTS: ICD-10-CM

## 2019-07-22 DIAGNOSIS — Z74.09 DECREASED FUNCTIONAL MOBILITY AND ENDURANCE: ICD-10-CM

## 2019-07-22 DIAGNOSIS — M25.532 PAIN IN BOTH WRISTS: ICD-10-CM

## 2019-07-22 DIAGNOSIS — R26.9 GAIT ABNORMALITY: ICD-10-CM

## 2019-07-22 DIAGNOSIS — R29.3 POSTURE IMBALANCE: ICD-10-CM

## 2019-07-22 PROCEDURE — 97110 THERAPEUTIC EXERCISES: CPT | Mod: PO

## 2019-07-22 PROCEDURE — 97018 PARAFFIN BATH THERAPY: CPT | Mod: PO

## 2019-07-22 PROCEDURE — 97140 MANUAL THERAPY 1/> REGIONS: CPT | Mod: PO

## 2019-07-22 NOTE — PROGRESS NOTES
Occupational Therapy Daily Treatment Note     Date: 7/22/2019  Name: Annika Mac  Clinic Number: [id number]    Therapy Diagnosis:   Encounter Diagnoses   Name Primary?    Pain in both wrists     Joint stiffness of both wrists     Muscle weakness      Physician: Marky Hagen MD    Physician Orders: Eval & Treat  Medical Diagnosis: B de Quervain's  Surgical Procedure and Date: None  Evaluation Date: 06/19/19  Insurance Authorization Period Expiration: 06/13/2020  Plan of Care Certification Period: 06/19/19 to 08/02/19  Date of Return to MD: 07/24/19      Visit # / Visits authorized: 8 / 50  Time In: 1:00 pm  Time Out: 2:00 pm   Total Billable Time:  45 minutes (P, 1MT, 1TE)    Precautions:  Standard and Diabetes      Subjective     Pt reports:  She fell over the weekend, but did not injure her hands.   she was compliant with home exercise program given last session.   Response to previous treatment:No adverse reactions  Functional change: able to use both wrist/hands in light self-care tasks with splints donned and less pain; able to sleep through the night with less pain    Pain: R=0/10;  L=4-5/10 pre-tx  Location: bilateral wrists/hands      Objective     Annika received the following supervised modalities after being cleared for contradictions for 10 minutes:   -Paraffin w/ MHP to R wrist/hand, pre-tx to decrease pain & increase tissue extensibility    Annika received the following manual therapy techniques for 15 minutes:   -Edema mgmt w/ lymphatic drainage technique;  Deep STM, including MFR, CFM, TPR & scar mgmt to B forearm/wrist/hands, using hand techniques and IASTM tools to increase blood flow/circulation, improve soft tissue pliability and decrease pain.  -Kinesiotapng, for de Quervain's taping pattern L wrist/hand     Annika received therapeutic exercises for 20 minutes direct care and 15 minutes of supervised  including:    Dexterciser  3 min   Isospheres 3 min   9 Peg  3 trial, R supinated, L pronated         T-putty: yellow    -Molding 3 min each   -grasp/manipulation 3 reps each   -log rolls 1 log each       Home Exercises and Education Provided     Education provided:   - Reviewed HEP    Written Home Exercises Provided: Patient instructed to cont prior HEP.  Exercises were reviewed and Annika was able to demonstrate them prior to the end of the session.  Annika demonstrated good  understanding of the HEP provided.     See EMR under Patient Instructions for exercises provided prior visit.        Assessment     Patient continues to tolerate treatment well and is making steady progress towards goals set at initial evaluation for increased B wrist/hand functioning.  She is tolerating treatment fairly well.          Annika is progressing well towards her goals and there are no updates to goals at this time. Pt prognosis is Good.     Pt will continue to benefit from skilled outpatient occupational therapy to address the deficits listed in the problem list on initial evaluation provide pt/family education and to maximize pt's level of independence in the home and community environment.     Anticipated barriers to occupational therapy: Arthritis, Diabetes    Pt's spiritual, cultural and educational needs considered and pt agreeable to plan of care and goals.    Goals:  Short Term Goals: (in 2 weeks-07/02/19)  1) Patient will be independent in HEP  2) Decrease pain in B wrist/thumbs to no more than 3-4/10 worst during ADL/IADL's   3) Increase AROM in L wrist flex & UD by 5 degrees for improved functioning in ADL/IADL's  4) Increase JASSO L thumb by 10 degrees for improved functioning in ADL/IADL's  5) Increase B  strength by 5-8 psi for increased functional use  6) Decrease edema in B wrist by .2-.3 cm      Long Term Goals: (in 6 weeks-08/02/19)  1) Decrease pain in B wrist/thumb to no more than 1-2/10 worst with light to moderate ADL/IADL's   2) Increase AROM of B wrist/thumb to WFL for increased functioning in  ADL/IADL's  3) Increase strength in B  by 30% of initial measures for improved functioning in ADL/IADL's  4) Increased functioning in ADL/IADL's, as evidenced by a FOTO impairment rating of no more than 30%  5) Decrease edema in B wrist to trace or none       Plan     Certification Period/Plan of care expiration: 6/19/2019 to 08/02/19.     Outpatient Occupational Therapy 2 times weekly for 6 weeks to include the following interventions: Paraffin, Manual therapy/joint mobilizations, Modalities for pain management, US 3 mhz, Therapeutic exercises/activities., Strengthening, Orthotic Fabrication/Fit/Training, Edema Control, Electrical Modalities and Joint Protection.       Pratik Veloz, OT

## 2019-07-22 NOTE — PATIENT INSTRUCTIONS
Seated Clams, Isometric Hip Abduction    Sit, Red band tied just above knees. Pull knees apart. Hold for 2 seconds.  Repeat 15 times. Do 2 times a day.      Seated Hip Adduction Ball Squeeze    Sit with ball or towel roll between knees. Push knees together. Hold for 5 seconds.  Repeat 15 times. Do 2 times a day.

## 2019-07-22 NOTE — PROGRESS NOTES
"  Physical Therapy Daily Treatment Note     Name: Annika Mac  Clinic Number: [id number]    Therapy Diagnosis:   Encounter Diagnoses   Name Primary?    Weakness of both lower extremities     Decreased functional mobility and endurance     Gait abnormality     Posture imbalance      Physician: Irena Berger PA-C    Visit Date: 7/22/2019    Physician Orders: PT Eval and Treat: Lumbar protocol with home exercises. ROM, stretching lumbar and abdominal musculature. Aerobic condidtioning, aqua therapy , with modalities including ultrasound, massage, dry needling PRN. 3 X week for 6 weeks.  Medical Diagnosis from Referral: Spondylolisthesis, unspecified spinal region; Lumbar radiculopathy  Evaluation Date: 7/15/2019  Authorization Period Expiration: 07/02/2020  Plan of Care Expiration: 09/15/19  Visit # / Visits authorized: 3/50    Time In: 2:10 pm   Time Out: 2:55 pm  Total Billable Time: 45 minutes    Precautions: Standard    Subjective     Pt reports: her back pain is 6/10 while sitting but is 8/10 when lying down.  She was compliant with home exercise program.  Response to previous treatment: sore  Functional change: n/a     Pain: 6/10  Location: bilateral back      Objective     Annika received therapeutic exercises to develop strength, endurance, ROM, flexibility and core stabilization for 45 minutes including:      Date  07/22/19 07/17/19   VISIT 3/50 2/10   POC EXP. DATE 09/15/19       09/15/19   VISIT AMOUNT  MEDICARE TOTAL 90.96  264.80 60.64  173.84   FACE-TO-FACE 08/15/19 08/15/19        TABLE:     HSS w/ strap 10 x 10" sitting 10 x 10"   Piriformis stretch Not today 5 x 5"   Hip flexor stretch Not today 3 x 10"   TA's 20 x 3" sitting 20 x 3"   LTR 2 x 10 sitting 2 x 10   Marching w/TA Not today 2 x 10   SAQ Not today 2 x 10   Hip abduction - sitting 1 x 15 RTB NT   Hip adduction - sitting 1 x 15 w/ball NT        SEATED:     LAQs 2 x 10 2 x 10   Seated Hip Flex 2 x 10 2 x 10   HS curls 2 x 10 RTB 2 x 10    " "    STANDING:     Tandem Walking in // 4 x 8 ft 3L   Tandem Stance  2 x 30" 2 x 30"   SL Balance  3 x 10" 3 x 10"             Initials GWA 1/6 BJ       Home Exercises Provided and Patient Education Provided     Education provided:   - keeping all exercises in a pain free range.    Written Home Exercises Provided: yes.  Exercises were reviewed and Annika was able to demonstrate them prior to the end of the session.  Annika demonstrated good  understanding of the education provided.      See EMR under Patient Instructions for exercises provided 7/22/2019.    Assessment     Annika completed the above exercise with verbal and tactile cueing for proper form and had no difficulty with new exercises added along with today's progressions from yellow to red theraband with seated hamstring curls with no increase in symptoms prior to leaving the clinic.   Patient performed her exercises in sitting due to complaint of pain when in supine with head elevated.  Patient required moderate verbal cueing during balance activities.      Annika is progressing well towards her goals.   Pt prognosis is Fair.     Pt will continue to benefit from skilled outpatient physical therapy to address the deficits listed in the problem list box on initial evaluation, provide pt/family education and to maximize pt's level of independence in the home and community environment.     Pt's spiritual, cultural and educational needs considered and pt agreeable to plan of care and goals.     Anticipated barriers to physical therapy: none    Goals:   Short Term Goals: 4 weeks   Patient will be able to resume supine position for 30 minutes, when in bed in order to get a full night of rest. IN PROGRESS  Patient will be able to stand for 30 mins with min-no pain/difficulty in order to cook and clean dishes. IN PROGRESS  Increase (B) Hamstring flexibility a 10 degrees in order to reduce stress of low back. IN PROGRESS    Long Term Goals: 8 weeks   Patient will be able to " ambulated for desired distance with min-no pain/difficulty using rolling walker. IN PROGRESS'  Patient will be able to resume supine position for 1 hour, when in bed in order to get a full night of rest. IN PROGRESS  Patient will be able to sit for 1 hour with min-no difficulty in order to watch movie with her family. IN PROGRESS  Patient will be able to stand for 1 hour with min-no pain/difficulty in order to cook and clean dishes. IN PROGRESS    Plan     Increase reps on sitting exercises next visit.    Td Vargas, PTA,

## 2019-07-23 ENCOUNTER — CLINICAL SUPPORT (OUTPATIENT)
Dept: ENDOCRINOLOGY | Facility: CLINIC | Age: 69
End: 2019-07-23
Payer: MEDICARE

## 2019-07-23 DIAGNOSIS — E11.42 TYPE 2 DIABETES MELLITUS WITH DIABETIC POLYNEUROPATHY, WITH LONG-TERM CURRENT USE OF INSULIN: ICD-10-CM

## 2019-07-23 DIAGNOSIS — Z79.4 TYPE 2 DIABETES MELLITUS WITH DIABETIC POLYNEUROPATHY, WITH LONG-TERM CURRENT USE OF INSULIN: ICD-10-CM

## 2019-07-23 PROCEDURE — 95250 CONT GLUC MNTR PHYS/QHP EQP: CPT | Mod: PBBFAC

## 2019-07-23 NOTE — PROGRESS NOTES
.DIABETES EDUCATOR NOTE   Return of the Freestyle Chery Pro Sensor and Patient Log.    Patient returned to clinic today to return Glucose Sensor and signed patient log form used in [facility] procedure.    The CGMS Sensor will be scanned and downloaded. All reports will be imported into the patient's electronic medical record.    Endocrine Nurse Practitioner will complete data interpretation and make recommendations; will forward recommendations to the ordering provider for follow up with patient.

## 2019-07-24 ENCOUNTER — CLINICAL SUPPORT (OUTPATIENT)
Dept: REHABILITATION | Facility: HOSPITAL | Age: 69
End: 2019-07-24
Payer: MEDICARE

## 2019-07-24 ENCOUNTER — OFFICE VISIT (OUTPATIENT)
Dept: ORTHOPEDICS | Facility: CLINIC | Age: 69
End: 2019-07-24
Payer: MEDICARE

## 2019-07-24 ENCOUNTER — PATIENT MESSAGE (OUTPATIENT)
Dept: ENDOCRINOLOGY | Facility: CLINIC | Age: 69
End: 2019-07-24

## 2019-07-24 VITALS — BODY MASS INDEX: 48.82 KG/M2 | WEIGHT: 293 LBS | HEIGHT: 65 IN | RESPIRATION RATE: 20 BRPM

## 2019-07-24 DIAGNOSIS — M25.532 PAIN IN BOTH WRISTS: ICD-10-CM

## 2019-07-24 DIAGNOSIS — R29.898 WEAKNESS OF BOTH LOWER EXTREMITIES: ICD-10-CM

## 2019-07-24 DIAGNOSIS — R26.9 GAIT ABNORMALITY: ICD-10-CM

## 2019-07-24 DIAGNOSIS — Z74.09 DECREASED FUNCTIONAL MOBILITY AND ENDURANCE: ICD-10-CM

## 2019-07-24 DIAGNOSIS — M25.531 PAIN IN BOTH WRISTS: ICD-10-CM

## 2019-07-24 DIAGNOSIS — M65.4 DE QUERVAIN'S TENOSYNOVITIS, LEFT: Primary | ICD-10-CM

## 2019-07-24 DIAGNOSIS — M25.632 JOINT STIFFNESS OF BOTH WRISTS: ICD-10-CM

## 2019-07-24 DIAGNOSIS — R29.3 POSTURE IMBALANCE: ICD-10-CM

## 2019-07-24 DIAGNOSIS — M62.81 MUSCLE WEAKNESS: ICD-10-CM

## 2019-07-24 DIAGNOSIS — M25.631 JOINT STIFFNESS OF BOTH WRISTS: ICD-10-CM

## 2019-07-24 PROCEDURE — 99213 PR OFFICE/OUTPT VISIT, EST, LEVL III, 20-29 MIN: ICD-10-PCS | Mod: 25,S$PBB,, | Performed by: ORTHOPAEDIC SURGERY

## 2019-07-24 PROCEDURE — 99999 PR PBB SHADOW E&M-EST. PATIENT-LVL IV: ICD-10-PCS | Mod: PBBFAC,,, | Performed by: ORTHOPAEDIC SURGERY

## 2019-07-24 PROCEDURE — 99214 OFFICE O/P EST MOD 30 MIN: CPT | Mod: PBBFAC,PN | Performed by: ORTHOPAEDIC SURGERY

## 2019-07-24 PROCEDURE — 99213 OFFICE O/P EST LOW 20 MIN: CPT | Mod: 25,S$PBB,, | Performed by: ORTHOPAEDIC SURGERY

## 2019-07-24 PROCEDURE — 97140 MANUAL THERAPY 1/> REGIONS: CPT | Mod: PO

## 2019-07-24 PROCEDURE — 97110 THERAPEUTIC EXERCISES: CPT | Mod: PO

## 2019-07-24 PROCEDURE — 97018 PARAFFIN BATH THERAPY: CPT | Mod: PO

## 2019-07-24 PROCEDURE — 99999 PR PBB SHADOW E&M-EST. PATIENT-LVL IV: CPT | Mod: PBBFAC,,, | Performed by: ORTHOPAEDIC SURGERY

## 2019-07-24 PROCEDURE — 20550 TENDON SHEATH: ICD-10-PCS | Mod: S$PBB,LT,, | Performed by: ORTHOPAEDIC SURGERY

## 2019-07-24 PROCEDURE — 20550 NJX 1 TENDON SHEATH/LIGAMENT: CPT | Mod: PBBFAC,PN | Performed by: ORTHOPAEDIC SURGERY

## 2019-07-24 RX ORDER — ALLOPURINOL 300 MG/1
300 TABLET ORAL DAILY
COMMUNITY
Start: 2019-07-08 | End: 2020-03-11

## 2019-07-24 RX ORDER — DEXAMETHASONE SODIUM PHOSPHATE 4 MG/ML
4 INJECTION, SOLUTION INTRA-ARTICULAR; INTRALESIONAL; INTRAMUSCULAR; INTRAVENOUS; SOFT TISSUE
Status: DISCONTINUED | OUTPATIENT
Start: 2019-07-24 | End: 2019-07-24 | Stop reason: HOSPADM

## 2019-07-24 RX ADMIN — DEXAMETHASONE SODIUM PHOSPHATE 4 MG: 4 INJECTION INTRA-ARTICULAR; INTRALESIONAL; INTRAMUSCULAR; INTRAVENOUS; SOFT TISSUE at 10:07

## 2019-07-24 NOTE — PROGRESS NOTES
Occupational Therapy Daily Treatment Note     Date: 7/24/2019  Name: Annika Mac  Clinic Number: [id number]    Therapy Diagnosis:   Encounter Diagnoses   Name Primary?    Pain in both wrists     Joint stiffness of both wrists     Muscle weakness      Physician: Marky Hagen MD    Physician Orders: Eval & Treat  Medical Diagnosis: B de Quervain's  Surgical Procedure and Date: None  Evaluation Date: 06/19/19  Insurance Authorization Period Expiration: 06/13/2020  Plan of Care Certification Period: 06/19/19 to 08/02/19  Date of Return to MD: 07/24/19      Visit # / Visits authorized: 9 / 50  Time In: 1:00 pm  Time Out: 2:00 pm   Total Billable Time:  45 minutes (P, 1MT, 1TE)    Precautions:  Standard and Diabetes      Subjective     Pt reports:  She just returned from a RTD appointment from this morning and she received a cortisone injection in her L wrist  she was compliant with home exercise program given last session.   Response to previous treatment:No adverse reactions  Functional change: able to use both wrist/hands in light self-care tasks with splints donned and less pain; able to sleep through the night with less pain    Pain: R=2/10;  L 5-6/10 pre-tx  Location: bilateral wrists/hands      Objective     Annika received the following supervised modalities after being cleared for contradictions for 10 minutes:   -Paraffin w/ MHP to R wrist/hand, pre-tx to decrease pain & increase tissue extensibility    Annika received the following manual therapy techniques for 15 minutes:   -Edema mgmt w/ lymphatic drainage technique;  Deep STM, including MFR, CFM, TPR & scar mgmt to B forearm/wrist/hands, using hand techniques and IASTM tools to increase blood flow/circulation, improve soft tissue pliability and decrease pain.  -Kinesiotapng, for de Quervain's taping pattern L wrist/hand     Annika received therapeutic exercises for 20 minutes direct care and 15 minutes of supervised  including:  No TE performed with MALIK  today 2/2 recent cortisone injection.   Dexterciser  3 min   Isospheres 3 min   9 Peg  3 trial, R supinated       T-putty: yellow    -Molding 3 min each   -grasp/manipulation 3 reps each   -dowel digs 3 min   -log rolls w/ tripod pinch 1 log each       Home Exercises and Education Provided     Education provided:   - Reviewed HEP    Written Home Exercises Provided: Patient instructed to cont prior HEP.  Exercises were reviewed and Annika was able to demonstrate them prior to the end of the session.  Annika demonstrated good  understanding of the HEP provided.     See EMR under Patient Instructions for exercises provided prior visit.        Assessment     Patient is post cortisone injection of L wrist today, therefore no treatment was provided.  Above treatment listed was performed with RUE only, in which patient tolerated treatment well.          Annika is progressing well towards her goals and there are no updates to goals at this time. Pt prognosis is Good.     Pt will continue to benefit from skilled outpatient occupational therapy to address the deficits listed in the problem list on initial evaluation provide pt/family education and to maximize pt's level of independence in the home and community environment.     Anticipated barriers to occupational therapy: Arthritis, Diabetes    Pt's spiritual, cultural and educational needs considered and pt agreeable to plan of care and goals.    Goals:  Short Term Goals: (in 2 weeks-07/02/19)  1) Patient will be independent in HEP  2) Decrease pain in B wrist/thumbs to no more than 3-4/10 worst during ADL/IADL's   3) Increase AROM in L wrist flex & UD by 5 degrees for improved functioning in ADL/IADL's  4) Increase JASSO L thumb by 10 degrees for improved functioning in ADL/IADL's  5) Increase B  strength by 5-8 psi for increased functional use  6) Decrease edema in B wrist by .2-.3 cm      Long Term Goals: (in 6 weeks-08/02/19)  1) Decrease pain in B wrist/thumb to no more  than 1-2/10 worst with light to moderate ADL/IADL's   2) Increase AROM of B wrist/thumb to WFL for increased functioning in ADL/IADL's  3) Increase strength in B  by 30% of initial measures for improved functioning in ADL/IADL's  4) Increased functioning in ADL/IADL's, as evidenced by a FOTO impairment rating of no more than 30%  5) Decrease edema in B wrist to trace or none       Plan     Certification Period/Plan of care expiration: 6/19/2019 to 08/02/19.     Outpatient Occupational Therapy 2 times weekly for 6 weeks to include the following interventions: Paraffin, Manual therapy/joint mobilizations, Modalities for pain management, US 3 mhz, Therapeutic exercises/activities., Strengthening, Orthotic Fabrication/Fit/Training, Edema Control, Electrical Modalities and Joint Protection.       Pratik Veloz, OT

## 2019-07-24 NOTE — PROGRESS NOTES
[facility]  History & Physical  Orthopedics    SUBJECTIVE:      Chief Complaint:  Bilateral radial wrist pain    Referring Provider: No ref. provider found     History of Present Illness:  Patient is a 68 y.o. right hand dominant female who presents today with complaints of bilateral radial sided wrist pain.  She notes that she sustained a low energy fall on June 4, 2019 approximately 10 days ago and ever since that time she has rate had radial sided bilateral wrist pain.  She was placed in a volar short-arm wrist braces in the emergency department and x-rays revealed no acute fractures or dislocations.  She reports that she has been feeling somewhat better since the injury although she still has significant pain.  Of note she is unable to take NSAIDs or Tylenol per her her medical comorbidities by her report.  She reports the left wrist is approximately 8 to 9/10 in severity in the right wrist is 7 to 8/10 severity and she presents. for initial evaluation.  She denies any numbness or tingling or other complaints today..    Interval history July 24, 2019:  The patient returns today and re-evaluation of her bilateral de Quervain tenosynovitis and radial sided wrist pain.  She notes that the right wrist pain has nearly resolved but that her left radial sided wrist pain is still significantly bothering her.  The thumb spica braces has helped as has occupational therapy and she presents for evaluation.    Onset of symptoms/DOI was 10 days ago.    Symptoms are aggravated by activity and movement.    Symptoms are alleviated by rest and immobilization.    Symptoms consist of pain.    The patient rates their pain as a 9/10.    Attempted treatment(s) and/or interventions include rest and immobilization.     The patient denies any fevers, chills, N/V, D/C and presents for evaluation.       Past Medical History:   Diagnosis Date    Allergy     Anemia 7/27/2012    Arthritis     CHF (congestive heart  failure)     CKD (chronic kidney disease) stage 3, GFR 30-59 ml/min 2012    Clotting disorder     Colon polyp     Deep vein thrombophlebitis of left leg 2012    Degenerative disc disease     Diabetic peripheral neuropathy associated with type 2 diabetes mellitus 2012    GERD (gastroesophageal reflux disease)     HTN (hypertension) 2012    Hyperlipidemia 2012    Lead-induced gout of ankle or foot     right going on three weeks    Nonproliferative diabetic retinopathy of right eye 2012    Obstructive sleep apnea 2012    Osteoporosis     Proliferative diabetic retinopathy of left eye 2012    Stroke 2003    Type 2 diabetes mellitus with diabetic polyneuropathy 2012    Ulcer      Past Surgical History:   Procedure Laterality Date    CATARACT EXTRACTION W/  INTRAOCULAR LENS IMPLANT Left 3/2002    left     CATARACT EXTRACTION W/  INTRAOCULAR LENS IMPLANT Right     OD dr. olivares     SECTION      COLONOSCOPY N/A 2018    Performed [facility]an [facility] at [facility] (2ND [facility])    CYST REMOVAL  2011    left side of face    ESOPHAGOGASTRODUODENOSCOPY (EGD) N/A 2018    Performed by [facility] at [facility] (2ND FLR)    EYE SURGERY Bilateral 2012    eye implants    GANGLION CYST EXCISION  2007    Right wrist    INSERTION, IOL PROSTHESIS Right 2012    Performed by Linda Olivares MD at [facility] OR 1ST FLR    Pan Retinal Photocoagulation Bilateral     Dr. Monterroso (Proliferative Diabetic Retinopathy)    PHACOEMULSIFICATION, CATARACT Right 2012    Performed by [facility] at [facility] 1ST FLR    TOTAL ABDOMINAL HYSTERECTOMY  1982    TOTAL KNEE ARTHROPLASTY  2006    left    TRIGGER FINGER RELEASE  2009     Review of patient's allergies indicates:   Allergen Reactions    Iodinated contrast- oral and iv dye Rash     Social History     Social History Narrative    , lives with  "family; 3 children, 2 grandchildren     Family History   Problem Relation Age of Onset    Diabetes Mother     Hypertension Mother     Heart disease Father     Diabetes Sister     Thyroid disease Brother     Diabetes Brother     Hypertension Brother     No Known Problems Daughter     No Known Problems Son     No Known Problems Daughter     Amblyopia Neg Hx     Blindness Neg Hx     Glaucoma Neg Hx     Macular degeneration Neg Hx     Retinal detachment Neg Hx     Strabismus Neg Hx     Colon cancer Neg Hx     Esophageal cancer Neg Hx          Current Outpatient Medications:     amLODIPine (NORVASC) 10 MG tablet, Take 1 tablet (10 mg total) by mouth once daily., Disp: 90 tablet, Rfl: 3    aspirin 81 MG Chew, Take 1 tablet (81 mg total) by mouth once daily., Disp: , Rfl: 0    BD ULTRA-FINE KIKA PEN NEEDLE 32 gauge x 5/32" Ndle, To use 4 times daily, Disp: 200 each, Rfl: 20    blood sugar diagnostic Strp, 1 each by Misc.(Non-Drug; Combo Route) route 4 (four) times daily. Dx code  E11.42 . Pt use one touch verio., Disp: 200 each, Rfl: 12    blood-glucose meter kit, Use as instructed, true test/result meter, Disp: 1 each, Rfl: 0    blood-glucose meter kit, 1 each by Other route once daily. Use daily. Insurance proffered one touch  E11.42, Disp: 1 each, Rfl: 0    DULoxetine (CYMBALTA) 30 MG capsule, Take 1 capsule (30 mg total) by mouth once daily., Disp: 90 capsule, Rfl: 3    famotidine (PEPCID) 20 MG tablet, Take 1 tablet (20 mg total) by mouth 2 (two) times daily., Disp: 1 tablet, Rfl: 0    fexofenadine (ALLEGRA) 180 MG tablet, TAKE 1 TABLET BY MOUTH ONCE DAILY, Disp: 30 tablet, Rfl: 0    finasteride (PROSCAR) 5 mg tablet, TAKE 1 TABLET(5 MG) BY MOUTH EVERY DAY, Disp: 90 tablet, Rfl: 1    fluocinonide (LIDEX) 0.05 % external solution, AAA scalp qday - bid prn pruritus, Disp: 60 mL, Rfl: 3    furosemide (LASIX) 20 MG tablet, Take 1 tablet (20 mg total) by mouth once daily. Take an extra pill if " short of breath or leg swelling, Disp: 120 tablet, Rfl: 3    insulin NPH-insulin regular, 70/30, (NOVOLIN 70/30) 100 unit/mL (70-30) injection, Breakfast: 100 -110 units Lunch: 80 - 90 units Dinner: 65 - 70 units MAX TDD: 270, Disp: 3 vial, Rfl: 11    insulin regular 100 unit/mL Inj injection, Inject 120 Units into the skin 3 (three) times daily before meals. 120 units with breakfast, 110 units with lunch, and 90 units with dinner; , Disp: , Rfl:     irbesartan (AVAPRO) 300 MG tablet, Take 1 tablet (300 mg total) by mouth every evening., Disp: 90 tablet, Rfl: 3    ketoconazole (NIZORAL) 2 % shampoo, Wash hair with medicated shampoo at least 2x/week - let sit on scalp at least 5 minutes prior to rinsing, Disp: 120 mL, Rfl: 5    labetalol (NORMODYNE) 300 MG tablet, Take 1 tablet (300 mg total) by mouth 2 (two) times daily., Disp: 180 tablet, Rfl: 3    lancets 31 gauge Misc, 1 lancet by Misc.(Non-Drug; Combo Route) route 4 (four) times daily. E11.42 . Prescribed one touch, Disp: 200 each, Rfl: 6    lancets 33 gauge Misc, 1 lancet by Misc.(Non-Drug; Combo Route) route 4 (four) times daily. Dx code E11.42, Disp: 200 each, Rfl: 12    lancets Misc, by Misc.(Non-Drug; Combo Route) route., Disp: , Rfl:     multivitamin (THERAGRAN) per tablet, Take 1 tablet by mouth once daily., Disp: , Rfl:     pregabalin (LYRICA) 150 MG capsule, Take 1 capsule (150 mg total) by mouth 2 (two) times daily., Disp: 60 capsule, Rfl: 5    simvastatin (ZOCOR) 40 MG tablet, Take 0.5 tablets (20 mg total) by mouth every evening., Disp: 90 tablet, Rfl: 4    traMADol (ULTRAM) 50 mg tablet, Take 1 tablet (50 mg total) by mouth every 8 (eight) hours as needed., Disp: 90 tablet, Rfl: 3    triamcinolone acetonide 0.1% (KENALOG) 0.1 % ointment, AAA bid hands, Disp: 60 g, Rfl: 1    TRUETEST TEST STRIPS Strp, 1 strip by Misc.(Non-Drug; Combo Route) route 4 (four) times daily. Dx code E11.42. Prescribed cover by insurance., Disp: 400  "strip, Rfl: 4    allopurinol (ZYLOPRIM) 300 MG tablet, , Disp: , Rfl:     colchicine (COLCRYS) 0.6 mg tablet, Take 0.6 mg by mouth as needed., Disp: , Rfl:     glucagon, human recombinant, (GLUCAGON EMERGENCY KIT, HUMAN,) 1 mg SolR, Inject 1 mg into the muscle as needed., Disp: 1 each, Rfl: 3    liraglutide 0.6 mg/0.1 mL, 18 mg/3 mL, subq PNIJ (VICTOZA 3-MILAN) 0.6 mg/0.1 mL (18 mg/3 mL) PnIj, Inject 1.2 mg into the skin once daily., Disp: 3 mL, Rfl: 6      Review of Systems:  Constitutional: no fever or chills  Eyes: no visual changes  ENT: no nasal congestion or sore throat  Respiratory: no cough or shortness of breath  Cardiovascular: no chest pain  Gastrointestinal: no nausea or vomiting, tolerating diet  Musculoskeletal: pain and soreness    OBJECTIVE:      Vital Signs (Most Recent):  Vitals:    07/24/19 0944   Resp: 20   Weight: (!) 143 kg (315 lb 4.8 oz)   Height: 5' 5" (1.651 m)     Body mass index is 52.47 kg/m².      Physical Exam:  Constitutional: The patient appears well-developed and well-nourished. No distress.   Head: Normocephalic and atraumatic.   Nose: Nose normal.   Eyes: Conjunctivae and EOM are normal.   Neck: No tracheal deviation present.   Cardiovascular: Normal rate and intact distal pulses.    Pulmonary/Chest: Effort normal. No respiratory distress.   Abdominal: There is no guarding.   Neurological: The patient is alert.   Psychiatric: The patient has a normal mood and affect.     Bilateral Hand/Wrist Examination:    Observation/Inspection:  Swelling  none    Deformity  none  Discoloration  none     Scars   none    Atrophy  none    HAND/WRIST EXAMINATION:  Finkelstein's Test   positive  WHAT Test    markedly positive  Snuff box tenderness   Neg  Juarez's Test    Neg  Hook of Hamate Tenderness  Neg  CMC grind    Neg  Circumduction test   Neg    Neurovascular Exam:  Digits WWP, brisk CR < 3s throughout  NVI motor/LTS to M/R/U nerves, radial pulse 2+  Tinel's Test - Carpal " Tunnel  Neg  Tinel's Test - Cubital Tunnel  Neg  Phalen's Test    Neg  Median Nerve Compression Test Neg    ROM hand/elbow full, painless  Wrist and thumb range of motion painful and limited secondary to pain    Diagnostic Results:     Xray -  x-ray bilateral wrists demonstrates no acute fractures or dislocations, mild did degenerative disease  EMG - none    ASSESSMENT/PLAN:      68 y.o. yo female with bilateral de Quervain tenosynovitis status post recent fall  Plan:  The patient is slowly responding to conservative treatment.  The right wrist has been feeling much better but the left wrist is still symptomatic.  We will proceed with a left 1st dorsal compartment injection today and have the patient return in 6 weeks.        Marky Hagen M.D.     Please be aware that this note has been generated with the assistance of [employer] voice-to-text.  Please excuse any spelling or grammatical errors.

## 2019-07-24 NOTE — PROCEDURES
L first doral compartmentTendon Sheath  Date/Time: 7/24/2019 10:16 AM  Performed by: Marky Hagen MD  Authorized by: Marky Hagen MD     Consent Done?: Yes (Verbal)  Timeout: prior to procedure the correct patient, procedure, and site was verified    Indications:  Pain  Site marked: the procedure site was marked    Timeout: prior to procedure the correct patient, procedure, and site was verified    Location:  Wrist  Prep: patient was prepped and draped in usual sterile fashion    Ultrasonic guidance for needle placement?: No    Needle size:  25 G  Approach:  Radial  Medications:  4 mg dexamethasone 4 mg/mL  Patient tolerance:  Patient tolerated the procedure well with no immediate complications

## 2019-07-24 NOTE — PROGRESS NOTES
"  Physical Therapy Daily Treatment Note     Name: Annika Mac  Clinic Number: [id number]    Therapy Diagnosis:   Encounter Diagnoses   Name Primary?    Weakness of both lower extremities     Decreased functional mobility and endurance     Gait abnormality     Posture imbalance      Physician: Irena Berger PA-C    Visit Date: 7/24/2019    Physician Orders: PT Eval and Treat: Lumbar protocol with home exercises. ROM, stretching lumbar and abdominal musculature. Aerobic condidtioning, aqua therapy , with modalities including ultrasound, massage, dry needling PRN. 3 X week for 6 weeks.  Medical Diagnosis from Referral: Spondylolisthesis, unspecified spinal region; Lumbar radiculopathy  Evaluation Date: 7/15/2019  Authorization Period Expiration: 07/02/2020  Plan of Care Expiration: 09/15/19  Visit # / Visits authorized: 3/50    Time In: 2:00 pm   Time Out: 2:40  pm  Total Billable Time: 40 minutes    Precautions: Standard    Subjective     Pt reports: she is experiencing her usual back pain. Patient also reports she has increased pain when walking   She was compliant with home exercise program.  Response to previous treatment: mild soreness   Functional change: n/a     Pain: 6/10  Location: bilateral back      Objective     Annika received therapeutic exercises to develop strength, endurance, ROM, flexibility and core stabilization for 40 minutes including:      Date  07/24/19 07/22/19 07/17/19   VISIT 4/50 3/50 2/10   POC EXP. DATE 09/15/19 09/15/19       09/15/19   VISIT AMOUNT  MEDICARE TOTAL 90.96  355.76 90.96  264.80 60.64  173.84   FACE-TO-FACE 08/15/19 08/15/19 08/15/19         TABLE:      HSS w/ strap 10 x 10" sitting 10 x 10" sitting 10 x 10"   Piriformis stretch NT Not today 5 x 5"   Hip flexor stretch NT Not today 3 x 10"   TA's 20 x 3" 20 x 3" sitting 20 x 3"   LTR 2 x 10 sitting  2 x 10 sitting 2 x 10   SAQ NT Not today 2 x 10   Hip abduction - sitting 2 x 10  1 x 15 RTB NT   Hip adduction - sitting " "2 x 10 x 3" 1 x 15 w/ball NT         SEATED:      LAQs 2 x 10 2 x 10 2 x 10   Seated Hip Flex 2 x 10 2 x 10 2 x 10   HS curls 2 x 10 RTB 2 x 10 RTB 2 x 10         STANDING:      Tandem Walking in // 4 x 8 ft 4 x 8 ft 3L   Tandem Stance  2 x 30" 2 x 30" 2 x 30"   SL Balance  3 x 10" 3 x 10" 3 x 10"   Hip ABD 1 x 10     Hip Flex 1 x 10     Hip Ext 1 x 10     Mini Squats in // 1 x 10                       Initials BJ GWA 1/6 BJ       Home Exercises Provided and Patient Education Provided     Education provided:   - continue home HEP on non-therapy days    Written Home Exercises Provided: yes.  Exercises were reviewed and Annika was able to demonstrate them prior to the end of the session.  Annika demonstrated good  understanding of the education provided.      See EMR under Patient Instructions for exercises provided 7/22/2019.    Assessment     Annika is progressing well in therapy and is now able to tolerate increased repetitions of therex. Patient required fewer rest breaks today. Patient also tolerates exercise in the sitting position better than lying supine. Patient continues to experience moderate difficulty with balancing activities and requires verbal cues to relax to reduce tension. Patient experiences moderate LOB due to fear of falling. Progressed LE standing strengthening today in which patient tolerated with no difficulty but did experience muscle fatigue. Will continue to progress patient as tolerated.     Annika is progressing well towards her goals.   Pt prognosis is Fair.     Pt will continue to benefit from skilled outpatient physical therapy to address the deficits listed in the problem list box on initial evaluation, provide pt/family education and to maximize pt's level of independence in the home and community environment.     Pt's spiritual, cultural and educational needs considered and pt agreeable to plan of care and goals.     Anticipated barriers to physical therapy: none    Goals:   Short Term " Goals: 4 weeks   Patient will be able to resume supine position for 30 minutes, when in bed in order to get a full night of rest. IN PROGRESS  Patient will be able to stand for 30 mins with min-no pain/difficulty in order to cook and clean dishes. IN PROGRESS  Increase (B) Hamstring flexibility a 10 degrees in order to reduce stress of low back. IN PROGRESS    Long Term Goals: 8 weeks   Patient will be able to ambulated for desired distance with min-no pain/difficulty using rolling walker. IN PROGRESS'  Patient will be able to resume supine position for 1 hour, when in bed in order to get a full night of rest. IN PROGRESS  Patient will be able to sit for 1 hour with min-no difficulty in order to watch movie with her family. IN PROGRESS  Patient will be able to stand for 1 hour with min-no pain/difficulty in order to cook and clean dishes. IN PROGRESS    Plan     Increase reps on standing exercises next visit.     Muriel Cotton, PT, DPT

## 2019-07-29 ENCOUNTER — CLINICAL SUPPORT (OUTPATIENT)
Dept: REHABILITATION | Facility: HOSPITAL | Age: 69
End: 2019-07-29
Payer: MEDICARE

## 2019-07-29 DIAGNOSIS — M25.531 PAIN IN BOTH WRISTS: ICD-10-CM

## 2019-07-29 DIAGNOSIS — R29.898 WEAKNESS OF BOTH LOWER EXTREMITIES: ICD-10-CM

## 2019-07-29 DIAGNOSIS — M25.532 PAIN IN BOTH WRISTS: ICD-10-CM

## 2019-07-29 DIAGNOSIS — M62.81 MUSCLE WEAKNESS: ICD-10-CM

## 2019-07-29 DIAGNOSIS — R26.9 GAIT ABNORMALITY: ICD-10-CM

## 2019-07-29 DIAGNOSIS — M25.631 JOINT STIFFNESS OF BOTH WRISTS: ICD-10-CM

## 2019-07-29 DIAGNOSIS — M25.632 JOINT STIFFNESS OF BOTH WRISTS: ICD-10-CM

## 2019-07-29 DIAGNOSIS — R29.3 POSTURE IMBALANCE: ICD-10-CM

## 2019-07-29 DIAGNOSIS — Z74.09 DECREASED FUNCTIONAL MOBILITY AND ENDURANCE: ICD-10-CM

## 2019-07-29 PROCEDURE — 97110 THERAPEUTIC EXERCISES: CPT | Mod: PO

## 2019-07-29 PROCEDURE — 97140 MANUAL THERAPY 1/> REGIONS: CPT | Mod: PO

## 2019-07-29 PROCEDURE — 97018 PARAFFIN BATH THERAPY: CPT | Mod: PO,59

## 2019-07-29 NOTE — PROGRESS NOTES
"  Physical Therapy Daily Treatment Note     Name: Annika Mac  Clinic Number: [id number]    Therapy Diagnosis:   No diagnosis found.  Physician: Irena Berger PA-C    Visit Date: 7/29/2019    Physician Orders: PT Eval and Treat: Lumbar protocol with home exercises. ROM, stretching lumbar and abdominal musculature. Aerobic condidtioning, aqua therapy , with modalities including ultrasound, massage, dry needling PRN. 3 X week for 6 weeks.  Medical Diagnosis from Referral: Spondylolisthesis, unspecified spinal region; Lumbar radiculopathy  Evaluation Date: 7/15/2019  Authorization Period Expiration: 07/02/2020  Plan of Care Expiration: 09/15/19  Visit # / Visits authorized: 3/50    Time In: 10:00 am   Time Out: 10:42  am  Total Billable Time: 42 minutes    Precautions: Standard    Subjective     Pt reports: her back is bother her today because she has been up waling since 6 am.   She was compliant with home exercise program.  Response to previous treatment: mild soreness   Functional change: n/a     Pain: 8/10  Location: bilateral back      Objective     Annika received therapeutic exercises to develop strength, endurance, ROM, flexibility and core stabilization for 42 minutes including:      Date  07/29/19 07/24/19 07/22/19 07/17/19   VISIT 5/50 4/50 3/50 2/10   POC EXP. DATE 09/15/19 09/15/19 09/15/19       09/15/19   VISIT AMOUNT  MEDICARE TOTAL 90.96  446.72 90.96  355.76 90.96  264.80 60.64  173.84   FACE-TO-FACE 08/15/19 08/15/19 08/15/19 08/15/19          TABLE:       HSS w/ strap 10 x 10" sitting  10 x 10" sitting 10 x 10" sitting 10 x 10"   Piriformis stretch  NT Not today 5 x 5"   Hip flexor stretch  NT Not today 3 x 10"   TA's 20 x 5" 20 x 3" 20 x 3" sitting 20 x 3"   LTR 3 x 10 sitting  2 x 10 sitting  2 x 10 sitting 2 x 10   SAQ NT NT Not today 2 x 10   Hip abduction - sitting 3 x 10 RTB 2 x 10 RTB 1 x 15 RTB NT   Hip adduction - sitting 3 x 10 x 5" 2 x 10 x 3" 1 x 15 w/ball NT          SEATED:       LAQs " "1 x 25 x 1# 2 x 10 2 x 10 2 x 10   Seated Hip Flex 1 x 25 x 1# 2 x 10 2 x 10 2 x 10   HS curls 3 x 10 x RTB 2 x 10 RTB 2 x 10 RTB 2 x 10          STANDING:       Tandem Walking in // 4 x 8 ft 4 x 8 ft 4 x 8 ft 3L   Tandem Stance  2 x 10" 2 x 30" 2 x 30" 2 x 30"   SL Balance  3 x 10" 3 x 10" 3 x 10" 3 x 10"   Hip ABD 1 x 15 1 x 10     Hip Flex 1 x 15 1 x 10     Hip Ext 1 x 15 1 x 10     Mini Squats in // 1 x 15 1 x 10                          Initials BJ BJ GWA 1/6 BJ       Home Exercises Provided and Patient Education Provided     Education provided:   - stretching before getting out of bed     Written Home Exercises Provided: yes.  Exercises were reviewed and Annika was able to demonstrate them prior to the end of the session.  Annika demonstrated good  understanding of the education provided.      See EMR under Patient Instructions for exercises provided 7/22/2019.    Assessment     Annika performed the above exercises 1:1 with PT. Increased repetitions and added ankle weights to LAQ and marches. Patient completed with no difficulty. Patient continues to have some difficulty with standing therex, however is able to complete all repetitions with rest breaks.     Annika is progressing well towards her goals.   Pt prognosis is Fair.     Pt will continue to benefit from skilled outpatient physical therapy to address the deficits listed in the problem list box on initial evaluation, provide pt/family education and to maximize pt's level of independence in the home and community environment.     Pt's spiritual, cultural and educational needs considered and pt agreeable to plan of care and goals.     Anticipated barriers to physical therapy: none    Goals:   Short Term Goals: 4 weeks   Patient will be able to resume supine position for 30 minutes, when in bed in order to get a full night of rest. IN PROGRESS  Patient will be able to stand for 30 mins with min-no pain/difficulty in order to cook and clean dishes. IN " PROGRESS  Increase (B) Hamstring flexibility a 10 degrees in order to reduce stress of low back. IN PROGRESS    Long Term Goals: 8 weeks   Patient will be able to ambulated for desired distance with min-no pain/difficulty using rolling walker. IN PROGRESS'  Patient will be able to resume supine position for 1 hour, when in bed in order to get a full night of rest. IN PROGRESS  Patient will be able to sit for 1 hour with min-no difficulty in order to watch movie with her family. IN PROGRESS  Patient will be able to stand for 1 hour with min-no pain/difficulty in order to cook and clean dishes. IN PROGRESS    Plan     Heel Raises next visit     Muriel Cotton, PT, DPT

## 2019-07-29 NOTE — PROGRESS NOTES
"  Occupational Therapy Daily Treatment Note     Date: 7/29/2019  Name: Annika Mac  Clinic Number: [id number]    Therapy Diagnosis:   Encounter Diagnoses   Name Primary?    Pain in both wrists     Joint stiffness of both wrists     Muscle weakness      Physician: Marky Hagen MD    Physician Orders: Eval & Treat  Medical Diagnosis: B de Quervain's  Surgical Procedure and Date: None  Evaluation Date: 06/19/19  Insurance Authorization Period Expiration: 06/13/2020  Plan of Care Certification Period: 06/19/19 to 08/02/19  Date of Return to MD: 07/24/19      Visit # / Visits authorized: 10 / 50    Re-assess next treatment     Time In: 9:00 am  Time Out: 10:00 am   Total Billable Time:  45 minutes (P, 1MT, 1TE)  Precautions:  Standard and Diabetes      Subjective     Pt reports:  "I think the shot I got in my L wrist is helping get rid of the pain. It's starting to feel better"  she was compliant with home exercise program given last session.   Response to previous treatment:No adverse reactions  Functional change: able to use both wrist/hands in light self-care tasks with splints donned and less pain; able to sleep through the night with less pain    Pain: R=1/10;  L 4-5/10 pre-tx  Location: bilateral wrists/hands      Objective     Annika received the following supervised modalities after being cleared for contradictions for 10 minutes:   -Paraffin w/ MHP to R wrist/hand, pre-tx to decrease pain & increase tissue extensibility    Annika received the following manual therapy techniques for 15 minutes:   -Edema mgmt w/ lymphatic drainage technique;  Deep STM, including MFR, CFM, TPR & scar mgmt to B forearm/wrist/hands, using hand techniques and IASTM tools to increase blood flow/circulation, improve soft tissue pliability and decrease pain.  -Kinesiotapng, for de Quervain's taping pattern L wrist/hand     Annika received therapeutic exercises for 20 minutes direct care and 15 minutes of supervised  including:  B " wrist/hand    Dexterciser  3 min   Isospheres 3 min   9 Peg  3 trial, R supinated       T-putty: yellow   -Molding 3 min each   -grasp/manipulation 3 reps each   -dowel digs 3 min   -log rolls w/ tripod pinch 1 log each       Home Exercises and Education Provided     Education provided:   - Reviewed HEP    Written Home Exercises Provided: Patient instructed to cont prior HEP.  Exercises were reviewed and Annika was able to demonstrate them prior to the end of the session.  Annika demonstrated good  understanding of the HEP provided.     See EMR under Patient Instructions for exercises provided prior visit.        Assessment     Patient's pain is decreasing in B wrist's and patient is tolerating treatment better.  She is gaining strength with each treatment session as program is being progressed.    Recommend to re-assess at next visit.     Annika is progressing well towards her goals and there are no updates to goals at this time. Pt prognosis is Good.     Pt will continue to benefit from skilled outpatient occupational therapy to address the deficits listed in the problem list on initial evaluation provide pt/family education and to maximize pt's level of independence in the home and community environment.     Anticipated barriers to occupational therapy: Arthritis, Diabetes    Pt's spiritual, cultural and educational needs considered and pt agreeable to plan of care and goals.    Goals:  Short Term Goals: (in 2 weeks-07/02/19)  1) Patient will be independent in HEP  2) Decrease pain in B wrist/thumbs to no more than 3-4/10 worst during ADL/IADL's   3) Increase AROM in L wrist flex & UD by 5 degrees for improved functioning in ADL/IADL's  4) Increase JASSO L thumb by 10 degrees for improved functioning in ADL/IADL's  5) Increase B  strength by 5-8 psi for increased functional use  6) Decrease edema in B wrist by .2-.3 cm      Long Term Goals: (in 6 weeks-08/02/19)  1) Decrease pain in B wrist/thumb to no more than  1-2/10 worst with light to moderate ADL/IADL's   2) Increase AROM of B wrist/thumb to WFL for increased functioning in ADL/IADL's  3) Increase strength in B  by 30% of initial measures for improved functioning in ADL/IADL's  4) Increased functioning in ADL/IADL's, as evidenced by a FOTO impairment rating of no more than 30%  5) Decrease edema in B wrist to trace or none       Plan     Certification Period/Plan of care expiration: 6/19/2019 to 08/02/19.    Re-assess at next treatment    Outpatient Occupational Therapy 2 times weekly for 6 weeks to include the following interventions: Paraffin, Manual therapy/joint mobilizations, Modalities for pain management, US 3 mhz, Therapeutic exercises/activities., Strengthening, Orthotic Fabrication/Fit/Training, Edema Control, Electrical Modalities and Joint Protection.       Pratik Veloz, OT

## 2019-07-31 ENCOUNTER — CLINICAL SUPPORT (OUTPATIENT)
Dept: REHABILITATION | Facility: HOSPITAL | Age: 69
End: 2019-07-31
Payer: MEDICARE

## 2019-07-31 DIAGNOSIS — M25.632 JOINT STIFFNESS OF BOTH WRISTS: ICD-10-CM

## 2019-07-31 DIAGNOSIS — M62.81 MUSCLE WEAKNESS: ICD-10-CM

## 2019-07-31 DIAGNOSIS — M25.631 JOINT STIFFNESS OF BOTH WRISTS: ICD-10-CM

## 2019-07-31 DIAGNOSIS — Z74.09 DECREASED FUNCTIONAL MOBILITY AND ENDURANCE: ICD-10-CM

## 2019-07-31 DIAGNOSIS — R26.9 GAIT ABNORMALITY: ICD-10-CM

## 2019-07-31 DIAGNOSIS — R29.3 POSTURE IMBALANCE: ICD-10-CM

## 2019-07-31 DIAGNOSIS — M25.531 PAIN IN BOTH WRISTS: ICD-10-CM

## 2019-07-31 DIAGNOSIS — M25.532 PAIN IN BOTH WRISTS: ICD-10-CM

## 2019-07-31 DIAGNOSIS — R29.898 WEAKNESS OF BOTH LOWER EXTREMITIES: ICD-10-CM

## 2019-07-31 PROCEDURE — 97140 MANUAL THERAPY 1/> REGIONS: CPT | Mod: PO

## 2019-07-31 PROCEDURE — 97110 THERAPEUTIC EXERCISES: CPT | Mod: PO

## 2019-07-31 PROCEDURE — 97018 PARAFFIN BATH THERAPY: CPT | Mod: PO

## 2019-07-31 NOTE — PROGRESS NOTES
"  Occupational Therapy Daily Treatment Note     Date: 7/31/2019  Name: Annika Mac  Clinic Number: [id number]    Therapy Diagnosis:   Encounter Diagnoses   Name Primary?    Pain in both wrists     Joint stiffness of both wrists     Muscle weakness      Physician: Marky Hagen MD    Physician Orders: Eval & Treat  Medical Diagnosis: B de Quervain's  Surgical Procedure and Date: None  Evaluation Date: 06/19/19  Insurance Authorization Period Expiration: 06/13/2020  Plan of Care Certification Period: 06/19/19 to 08/02/19  Date of Return to MD: 07/24/19      Visit # / Visits authorized: 11 / 50    Re-assess next treatment     Time In: 9:00 am  Time Out: 10:00 am   Total Billable Time:  45 minutes (P, 1MT, 1TE)  Precautions:  Standard and Diabetes      Subjective     Pt reports:  "I still have pain in my L wrist. The shot only seemed to help me for about a week"  she was compliant with home exercise program given last session.   Response to previous treatment:No adverse reactions  Functional change: able to use both wrist/hands in light self-care tasks with splints donned and less pain; able to sleep through the night with less pain    Pain: R=1/10;  L 4-5/10 pre-tx  Location: bilateral wrists/hands      Objective     Annika received the following supervised modalities after being cleared for contradictions for 10 minutes:   -Paraffin w/ MHP to R wrist/hand, pre-tx to decrease pain & increase tissue extensibility    Annika received the following manual therapy techniques for 15 minutes:   -Edema mgmt w/ lymphatic drainage technique;  Deep STM, including MFR, CFM, TPR & scar mgmt to B forearm/wrist/hands, using hand techniques and IASTM tools to increase blood flow/circulation, improve soft tissue pliability and decrease pain.  -Kinesiotapng, for de Quervain's taping pattern L wrist/hand     Annika received therapeutic exercises for 20 minutes direct care and 15 minutes of supervised  including:  B wrist/hand  "   Dexterciser  3 min   Isospheres 3 min   9 Peg  3 trial, R supinated       T-putty: yellow   -Molding 3 min each   -grasp/manipulation 3 reps each   -dowel digs 3 min   -log rolls w/ tripod pinch 1 log each       Home Exercises and Education Provided     Education provided:   - Reviewed HEP    Written Home Exercises Provided: Patient instructed to cont prior HEP.  Exercises were reviewed and Annika was able to demonstrate them prior to the end of the session.  Annika demonstrated good  understanding of the HEP provided.     See EMR under Patient Instructions for exercises provided prior visit.        Assessment     Patient's pain has improved greatly on the R wrist/hand, however, it appear her pain on her L wrist improved immediately after her cortisone injection, but is now increasing again.  She continues to report good compliance with wearing her pre-ling thumb spica splint and HEP    Annika is progressing well towards her goals and there are no updates to goals at this time. Pt prognosis is Good.     Pt will continue to benefit from skilled outpatient occupational therapy to address the deficits listed in the problem list on initial evaluation provide pt/family education and to maximize pt's level of independence in the home and community environment.     Anticipated barriers to occupational therapy: Arthritis, Diabetes    Pt's spiritual, cultural and educational needs considered and pt agreeable to plan of care and goals.    Goals:  Short Term Goals: (in 2 weeks-07/02/19)  1) Patient will be independent in HEP  2) Decrease pain in B wrist/thumbs to no more than 3-4/10 worst during ADL/IADL's   3) Increase AROM in L wrist flex & UD by 5 degrees for improved functioning in ADL/IADL's  4) Increase JASSO L thumb by 10 degrees for improved functioning in ADL/IADL's  5) Increase B  strength by 5-8 psi for increased functional use  6) Decrease edema in B wrist by .2-.3 cm      Long Term Goals: (in 6 weeks-08/02/19)  1)  Decrease pain in B wrist/thumb to no more than 1-2/10 worst with light to moderate ADL/IADL's   2) Increase AROM of B wrist/thumb to WFL for increased functioning in ADL/IADL's  3) Increase strength in B  by 30% of initial measures for improved functioning in ADL/IADL's  4) Increased functioning in ADL/IADL's, as evidenced by a FOTO impairment rating of no more than 30%  5) Decrease edema in B wrist to trace or none       Plan     Certification Period/Plan of care expiration: 6/19/2019 to 08/02/19.    Re-assess at next treatment    Outpatient Occupational Therapy 2 times weekly for 6 weeks to include the following interventions: Paraffin, Manual therapy/joint mobilizations, Modalities for pain management, US 3 mhz, Therapeutic exercises/activities., Strengthening, Orthotic Fabrication/Fit/Training, Edema Control, Electrical Modalities and Joint Protection.       Pratik Veloz, OT

## 2019-07-31 NOTE — PROGRESS NOTES
"  Physical Therapy Daily Treatment Note     Name: Annika Mac  Clinic Number: [id number]    Therapy Diagnosis:   No diagnosis found.  Physician: Irena Berger PA-C    Visit Date: 7/31/2019    Physician Orders: PT Eval and Treat: Lumbar protocol with home exercises. ROM, stretching lumbar and abdominal musculature. Aerobic condidtioning, aqua therapy , with modalities including ultrasound, massage, dry needling PRN. 3 X week for 6 weeks.  Medical Diagnosis from Referral: Spondylolisthesis, unspecified spinal region; Lumbar radiculopathy  Evaluation Date: 7/15/2019  Authorization Period Expiration: 07/02/2020  Plan of Care Expiration: 09/15/19  Visit # / Visits authorized: 3/50    Time In: 10:00 am   Time Out: 10:46  am  Total Billable Time: 46 minutes    Precautions: Standard    Subjective     Pt reports: she is experiencing her usual back pain however not as bad as before.  She was compliant with home exercise program.  Response to previous treatment: mild soreness   Functional change: n/a     Pain: 4/10  Location: bilateral back      Objective     Annika received therapeutic exercises to develop strength, endurance, ROM, flexibility and core stabilization for 46 minutes including:      Date  07/31/19 07/29/19 07/24/19 07/22/19 07/17/19   VISIT 6/50 5/50 4/50 3/50 2/10   POC EXP. DATE 09/15/19 09/15/19 09/15/19 09/15/19       09/15/19   VISIT AMOUNT  MEDICARE TOTAL 90.96   537.68 90.96  446.72 90.96  355.76 90.96  264.80 60.64  173.84   FACE-TO-FACE 08/15/19 08/15/19 08/15/19 08/15/19 08/15/19           TABLE:        HSS w/ strap 10 x 10" sitting  10 x 10" sitting  10 x 10" sitting 10 x 10" sitting 10 x 10"   Piriformis stretch --  NT Not today 5 x 5"   Hip flexor stretch --  NT Not today 3 x 10"   TA's 20 x 5" 20 x 5" 20 x 3" 20 x 3" sitting 20 x 3"   LTR 3 x 10 sitting  3 x 10 sitting  2 x 10 sitting  2 x 10 sitting 2 x 10   SAQ -- NT NT Not today 2 x 10   Hip abduction - sitting 3 x 10 RTB 3 x 10 RTB 2 x 10 RTB " "1 x 15 RTB NT   Hip adduction - sitting 3 x 10 x 5" 3 x 10 x 5" 2 x 10 x 3" 1 x 15 w/ball NT           SEATED:        LAQs 3 x 10 x 1# 1 x 25 x 1# 2 x 10 2 x 10 2 x 10   Seated Hip Flex 3 x 10 x 1# 1 x 25 x 1# 2 x 10 2 x 10 2 x 10   HS curls  3 x 10 x RTB 2 x 10 RTB 2 x 10 RTB 2 x 10           STANDING:        Tandem Walking in // NT 4 x 8 ft 4 x 8 ft 4 x 8 ft 3L   Tandem Stance  NT 2 x 10" 2 x 30" 2 x 30" 2 x 30"   SL Balance  NT 3 x 10" 3 x 10" 3 x 10" 3 x 10"   Hip ABD 1 x 15 1 x 15 1 x 10     Hip Flex 1 x 15 1 x 15 1 x 10     Hip Ext 1 x 15 1 x 15 1 x 10     Mini Squats in // 1 x 10 1 x 15 1 x 10     Heel Raises  2 x 10                       Initials BJ BJ BJ GWA 1/6 BJ       Home Exercises Provided and Patient Education Provided     Education provided:   - providing rest days on weekends from HEP    Written Home Exercises Provided: yes.  Exercises were reviewed and Annika was able to demonstrate them prior to the end of the session.  Annika demonstrated good  understanding of the education provided.      See EMR under Patient Instructions for exercises provided 7/22/2019.    Assessment     Annika continues to tolerate therapy well and is requiring less frequent rest breaks with activity. Patient is tolerating increased repetitions with exercise and demonstrating more endurance during standing activities. Patient required verbal cues to limit momentum used during standing hip extension. Patient was able to perform sit to stand transition without UE support x10 repetitions and 1 rest break.     Annika is progressing well towards her goals.   Pt prognosis is Fair.     Pt will continue to benefit from skilled outpatient physical therapy to address the deficits listed in the problem list box on initial evaluation, provide pt/family education and to maximize pt's level of independence in the home and community environment.     Pt's spiritual, cultural and educational needs considered and pt agreeable to plan of care and " goals.     Anticipated barriers to physical therapy: none    Goals:   Short Term Goals: 4 weeks   Patient will be able to resume supine position for 30 minutes, when in bed in order to get a full night of rest. IN PROGRESS  Patient will be able to stand for 30 mins with min-no pain/difficulty in order to cook and clean dishes. IN PROGRESS  Increase (B) Hamstring flexibility a 10 degrees in order to reduce stress of low back. IN PROGRESS    Long Term Goals: 8 weeks   Patient will be able to ambulated for desired distance with min-no pain/difficulty using rolling walker. IN PROGRESS'  Patient will be able to resume supine position for 1 hour, when in bed in order to get a full night of rest. IN PROGRESS  Patient will be able to sit for 1 hour with min-no difficulty in order to watch movie with her family. IN PROGRESS  Patient will be able to stand for 1 hour with min-no pain/difficulty in order to cook and clean dishes. IN PROGRESS    Plan     Progress hip ABD to GTB from RTB    Muriel Cotton, PT, DPT

## 2019-08-02 ENCOUNTER — TELEPHONE (OUTPATIENT)
Dept: ENDOCRINOLOGY | Facility: CLINIC | Age: 69
End: 2019-08-02

## 2019-08-02 DIAGNOSIS — N25.81 SECONDARY HYPERPARATHYROIDISM: ICD-10-CM

## 2019-08-02 DIAGNOSIS — E11.42 TYPE 2 DIABETES MELLITUS WITH DIABETIC POLYNEUROPATHY, WITH LONG-TERM CURRENT USE OF INSULIN: Primary | ICD-10-CM

## 2019-08-02 DIAGNOSIS — Z79.4 TYPE 2 DIABETES MELLITUS WITH DIABETIC POLYNEUROPATHY, WITH LONG-TERM CURRENT USE OF INSULIN: Primary | ICD-10-CM

## 2019-08-02 DIAGNOSIS — E55.9 VITAMIN D INSUFFICIENCY: ICD-10-CM

## 2019-08-02 NOTE — TELEPHONE ENCOUNTER
Called pt and left VM to give a call back. Contact no was provided.    Pt scheduled lab on  Monday.

## 2019-08-02 NOTE — TELEPHONE ENCOUNTER
Called pt and left VM with confirmed time and date for lab appointment scheduled on Monday. Contact no was provided.

## 2019-08-02 NOTE — TELEPHONE ENCOUNTER
----- Message from Ekta Aldridge sent at 8/2/2019  8:46 AM CDT -----  Contact: Self  Type:  Patient Returning Call    Who Called:Annika  Who Left Message for Patient:  Does the patient know what this is regarding?:  Would the patient rather a call back or a response via [employer]? call  Best Call Back Number:[phone]  Additional Information:

## 2019-08-02 NOTE — TELEPHONE ENCOUNTER
----- Message from Allyssa Buchanan sent at 8/2/2019  7:55 AM CDT -----  Contact: Self [phone]  The missed her lab appt yesterday and needs new orders placed in the system. Please advise pt once this has been done.

## 2019-08-05 ENCOUNTER — CLINICAL SUPPORT (OUTPATIENT)
Dept: REHABILITATION | Facility: HOSPITAL | Age: 69
End: 2019-08-05
Payer: MEDICARE

## 2019-08-05 ENCOUNTER — LAB VISIT (OUTPATIENT)
Dept: LAB | Facility: HOSPITAL | Age: 69
End: 2019-08-05
Attending: NURSE PRACTITIONER
Payer: MEDICARE

## 2019-08-05 DIAGNOSIS — Z79.4 TYPE 2 DIABETES MELLITUS WITH DIABETIC POLYNEUROPATHY, WITH LONG-TERM CURRENT USE OF INSULIN: ICD-10-CM

## 2019-08-05 DIAGNOSIS — R29.898 WEAKNESS OF BOTH LOWER EXTREMITIES: ICD-10-CM

## 2019-08-05 DIAGNOSIS — R26.9 GAIT ABNORMALITY: ICD-10-CM

## 2019-08-05 DIAGNOSIS — M25.531 PAIN IN BOTH WRISTS: ICD-10-CM

## 2019-08-05 DIAGNOSIS — E55.9 VITAMIN D INSUFFICIENCY: ICD-10-CM

## 2019-08-05 DIAGNOSIS — M25.632 JOINT STIFFNESS OF BOTH WRISTS: ICD-10-CM

## 2019-08-05 DIAGNOSIS — E11.42 TYPE 2 DIABETES MELLITUS WITH DIABETIC POLYNEUROPATHY, WITH LONG-TERM CURRENT USE OF INSULIN: ICD-10-CM

## 2019-08-05 DIAGNOSIS — M25.631 JOINT STIFFNESS OF BOTH WRISTS: ICD-10-CM

## 2019-08-05 DIAGNOSIS — M25.532 PAIN IN BOTH WRISTS: ICD-10-CM

## 2019-08-05 DIAGNOSIS — R29.3 POSTURE IMBALANCE: ICD-10-CM

## 2019-08-05 DIAGNOSIS — N25.81 SECONDARY HYPERPARATHYROIDISM: ICD-10-CM

## 2019-08-05 DIAGNOSIS — M62.81 MUSCLE WEAKNESS: ICD-10-CM

## 2019-08-05 DIAGNOSIS — Z74.09 DECREASED FUNCTIONAL MOBILITY AND ENDURANCE: ICD-10-CM

## 2019-08-05 LAB
25(OH)D3+25(OH)D2 SERPL-MCNC: 35 NG/ML (ref 30–96)
ESTIMATED AVG GLUCOSE: 206 MG/DL (ref 68–131)
HBA1C MFR BLD HPLC: 8.8 % (ref 4–5.6)
PTH-INTACT SERPL-MCNC: 88 PG/ML (ref 9–77)

## 2019-08-05 PROCEDURE — 82306 VITAMIN D 25 HYDROXY: CPT

## 2019-08-05 PROCEDURE — 97110 THERAPEUTIC EXERCISES: CPT | Mod: PO

## 2019-08-05 PROCEDURE — 36415 COLL VENOUS BLD VENIPUNCTURE: CPT | Mod: PO

## 2019-08-05 PROCEDURE — 83970 ASSAY OF PARATHORMONE: CPT

## 2019-08-05 PROCEDURE — 97140 MANUAL THERAPY 1/> REGIONS: CPT | Mod: PO

## 2019-08-05 PROCEDURE — 80053 COMPREHEN METABOLIC PANEL: CPT

## 2019-08-05 PROCEDURE — 83036 HEMOGLOBIN GLYCOSYLATED A1C: CPT

## 2019-08-05 NOTE — PROGRESS NOTES
"  Occupational Therapy Daily Treatment Note     Date: 8/5/2019  Name: Annika Mac  Clinic Number: [id number]    Therapy Diagnosis:   Encounter Diagnoses   Name Primary?    Pain in both wrists     Joint stiffness of both wrists     Muscle weakness      Physician: Marky Hagen MD    Physician Orders: Eval & Treat  Medical Diagnosis: B de Quervain's  Surgical Procedure and Date: None  Evaluation Date: 06/19/19  Insurance Authorization Period Expiration: 06/13/2020  Plan of Care Certification Period: 06/19/19 to 08/02/19  Date of Return to MD: 07/24/19      Visit # / Visits authorized: 12 / 50    Re-assess next treatment     Time In: 1:25 pm  Time Out: 2:00 pm   Total Billable Time:  35 minutes (1MT, 1TE)  Precautions:  Standard and Diabetes      Subjective     Pt reports:  "I did some cooking over the weekend. Maybe that is why I am hurting a little more today".  Patient arrived late for her appointment today  she was compliant with home exercise program given last session.   Response to previous treatment:No adverse reactions  Functional change: able to use both wrist/hands in light self-care tasks with splints donned and less pain; able to sleep through the night with less pain    Pain: R=3/10;  L 5-6/10 pre-tx  Location: bilateral wrists/hands      Objective     Annika received the following manual therapy techniques for 10 minutes:   -Edema mgmt w/ lymphatic drainage technique;  Deep STM, including MFR, CFM, TPR & scar mgmt to B forearm/wrist/hands, using hand techniques and IASTM tools to increase blood flow/circulation, improve soft tissue pliability and decrease pain.  -Kinesiotapng, for de Quervain's taping pattern L wrist/hand     Annika received therapeutic exercises for 15 minutes direct care and 10 minutes of supervised care including:  B wrist/hand    Dexterciser  3 min   Isospheres 3 min   9 Peg  1 trial        T-putty: yellow   -Molding 3 min each   -grasp/manipulation (not today) 3 reps each   -dowel " digs 3 min   -log rolls w/ tripod pinch (not today) 1 log each       Home Exercises and Education Provided     Education provided:   - Reviewed HEP    Written Home Exercises Provided: Patient instructed to cont prior HEP.  Exercises were reviewed and Annika was able to demonstrate them prior to the end of the session.  Annika demonstrated good  understanding of the HEP provided.     See EMR under Patient Instructions for exercises provided prior visit.        Assessment     Patient's pain slightly increased since last treatment, possibly due to over use during cooking activity.  She is reporting good compliance with wearing her L wrist splint, but it is uncertain if she was wearing it this weekend while cooking.  She tolerated treatment well today, but treatment was shortened due to patient being late to appointment.     Annika is progressing well towards her goals and there are no updates to goals at this time. Pt prognosis is Good.     Pt will continue to benefit from skilled outpatient occupational therapy to address the deficits listed in the problem list on initial evaluation provide pt/family education and to maximize pt's level of independence in the home and community environment.     Anticipated barriers to occupational therapy: Arthritis, Diabetes    Pt's spiritual, cultural and educational needs considered and pt agreeable to plan of care and goals.    Goals:  Short Term Goals: (in 2 weeks-07/02/19)  1) Patient will be independent in HEP  2) Decrease pain in B wrist/thumbs to no more than 3-4/10 worst during ADL/IADL's   3) Increase AROM in L wrist flex & UD by 5 degrees for improved functioning in ADL/IADL's  4) Increase JASSO L thumb by 10 degrees for improved functioning in ADL/IADL's  5) Increase B  strength by 5-8 psi for increased functional use  6) Decrease edema in B wrist by .2-.3 cm      Long Term Goals: (in 6 weeks-08/02/19)  1) Decrease pain in B wrist/thumb to no more than 1-2/10 worst with light  to moderate ADL/IADL's   2) Increase AROM of B wrist/thumb to WFL for increased functioning in ADL/IADL's  3) Increase strength in B  by 30% of initial measures for improved functioning in ADL/IADL's  4) Increased functioning in ADL/IADL's, as evidenced by a FOTO impairment rating of no more than 30%  5) Decrease edema in B wrist to trace or none       Plan     Certification Period/Plan of care expiration: 6/19/2019 to 08/02/19.    Re-assess at next treatment    Outpatient Occupational Therapy 2 times weekly for 6 weeks to include the following interventions: Paraffin, Manual therapy/joint mobilizations, Modalities for pain management, US 3 mhz, Therapeutic exercises/activities., Strengthening, Orthotic Fabrication/Fit/Training, Edema Control, Electrical Modalities and Joint Protection.       Pratik Veloz, OT

## 2019-08-05 NOTE — PROGRESS NOTES
Physical Therapy Daily Treatment Note     Name: Annika Mac  Clinic Number: [id number]    Therapy Diagnosis:   Encounter Diagnoses   Name Primary?    Weakness of both lower extremities     Decreased functional mobility and endurance     Gait abnormality     Posture imbalance      Physician: Irena Berger PA-C    Visit Date: 8/5/2019    Physician Orders: PT Eval and Treat: Lumbar protocol with home exercises. ROM, stretching lumbar and abdominal musculature. Aerobic condidtioning, aqua therapy , with modalities including ultrasound, massage, dry needling PRN. 3 X week for 6 weeks.  Medical Diagnosis from Referral: Spondylolisthesis, unspecified spinal region; Lumbar radiculopathy  Evaluation Date: 7/15/2019  Authorization Period Expiration: 07/02/2020  Plan of Care Expiration: 09/15/19  Visit # / Visits authorized: 7/50    Time In: 2:00 am   Time Out: 2:35 pm  Total Billable Time: 30 minutes    Precautions: Standard    Subjective     Pt reports: she walked a lot over the weekend as she had family in from out of town.  Patient states she was using her rollator but she did not rest very much and her back was starting to hurt Saturday night.  Patient reports her back pain is a little less today, 7/10.   She was compliant with home exercise program.  Response to previous treatment: mild soreness   Functional change: n/a     Pain: 7/10  Location: bilateral back      Objective     Annika received therapeutic exercises to develop strength, endurance, ROM, flexibility and core stabilization for 30 minutes including:      Date  08/05/19 07/31/19 07/29/19 07/24/19 07/22/19 07/17/19   VISIT 7/50 6/50 5/50 4/50 3/50 2/10   POC EXP. DATE 09/15/19 09/15/19 09/15/19 09/15/19 09/15/19       09/15/19   VISIT AMOUNT  MEDICARE TOTAL 60.64  598.32 90.96   537.68 90.96  446.72 90.96  355.76 90.96  264.80 60.64  173.84   FACE-TO-FACE 08/15/19 08/15/19 08/15/19 08/15/19 08/15/19 08/15/19            TABLE:         HSS w/ strap 10 x  "10" sitting 10 x 10" sitting  10 x 10" sitting  10 x 10" sitting 10 x 10" sitting 10 x 10"   Piriformis stretch --- --  NT Not today 5 x 5"   Hip flexor stretch --- --  NT Not today 3 x 10"   TA's 20 x 5" 20 x 5" 20 x 5" 20 x 3" 20 x 3" sitting 20 x 3"   LTR 3 x 10 sitting 3 x 10 sitting  3 x 10 sitting  2 x 10 sitting  2 x 10 sitting 2 x 10   SAQ --- -- NT NT Not today 2 x 10   Hip abduction - sitting 3 x 10 RTB 3 x 10 RTB 3 x 10 RTB 2 x 10 RTB 1 x 15 RTB NT   Hip adduction - sitting 3 x 10 x 5" 3 x 10 x 5" 3 x 10 x 5" 2 x 10 x 3" 1 x 15 w/ball NT            SEATED:         LAQs 3 x 10 x 1# 3 x 10 x 1# 1 x 25 x 1# 2 x 10 2 x 10 2 x 10   Seated Hip Flex 3 x 10 x 1# 3 x 10 x 1# 1 x 25 x 1# 2 x 10 2 x 10 2 x 10   HS curls 2 x 10 GTB   3 x 10 x RTB 2 x 10 RTB 2 x 10 RTB 2 x 10            STANDING:         Tandem Walking in // Not today NT 4 x 8 ft 4 x 8 ft 4 x 8 ft 3L   Tandem Stance  Not today NT 2 x 10" 2 x 30" 2 x 30" 2 x 30"   SL Balance  Not today NT 3 x 10" 3 x 10" 3 x 10" 3 x 10"   Hip ABD 2 x 10 1 x 15 1 x 15 1 x 10     Hip Flex 1 x 15 1 x 15 1 x 15 1 x 10     Hip Ext 2 x 10 1 x 15 1 x 15 1 x 10     Mini Squats in // 1 x 10 1 x 10 1 x 15 1 x 10     Heel Raises  2 x 10 2 x 10                         Initials GWA 1/6 BJ BJ BJ GWA 1/6 BJ     Anna received a cold pack x 5 minutes to her low back.    Home Exercises Provided and Patient Education Provided     Education provided:   - limit the distance she walks, to build up to longer distances over time.    Written Home Exercises Provided: yes.  Exercises were reviewed and Annika was able to demonstrate them prior to the end of the session.  Annika demonstrated good  understanding of the education provided.      See EMR under Patient Instructions for exercises provided 7/22/2019.    Assessment     Patient did not complete all of her exercises due to increased low back pain today.       Annika is progressing well towards her goals.   Pt prognosis is Fair.     Pt will " continue to benefit from skilled outpatient physical therapy to address the deficits listed in the problem list box on initial evaluation, provide pt/family education and to maximize pt's level of independence in the home and community environment.     Pt's spiritual, cultural and educational needs considered and pt agreeable to plan of care and goals.     Anticipated barriers to physical therapy: none    Goals:   Short Term Goals: 4 weeks   Patient will be able to resume supine position for 30 minutes, when in bed in order to get a full night of rest. IN PROGRESS  Patient will be able to stand for 30 mins with min-no pain/difficulty in order to cook and clean dishes. IN PROGRESS  Increase (B) Hamstring flexibility a 10 degrees in order to reduce stress of low back. IN PROGRESS    Long Term Goals: 8 weeks   Patient will be able to ambulated for desired distance with min-no pain/difficulty using rolling walker. IN PROGRESS'  Patient will be able to resume supine position for 1 hour, when in bed in order to get a full night of rest. IN PROGRESS  Patient will be able to sit for 1 hour with min-no difficulty in order to watch movie with her family. IN PROGRESS  Patient will be able to stand for 1 hour with min-no pain/difficulty in order to cook and clean dishes. IN PROGRESS    Plan     Progress hip ABD to GTB from RTB and resume all exercises next visit, pain permitting.     Td Vargas, PTA

## 2019-08-06 LAB
ALBUMIN SERPL BCP-MCNC: 3.4 G/DL (ref 3.5–5.2)
ALP SERPL-CCNC: 124 U/L (ref 55–135)
ALT SERPL W/O P-5'-P-CCNC: 15 U/L (ref 10–44)
ANION GAP SERPL CALC-SCNC: 8 MMOL/L (ref 8–16)
AST SERPL-CCNC: 17 U/L (ref 10–40)
BILIRUB SERPL-MCNC: 0.2 MG/DL (ref 0.1–1)
BUN SERPL-MCNC: 26 MG/DL (ref 8–23)
CALCIUM SERPL-MCNC: 9.4 MG/DL (ref 8.7–10.5)
CHLORIDE SERPL-SCNC: 107 MMOL/L (ref 95–110)
CO2 SERPL-SCNC: 25 MMOL/L (ref 23–29)
CREAT SERPL-MCNC: 1.7 MG/DL (ref 0.5–1.4)
EST. GFR  (AFRICAN AMERICAN): 35 ML/MIN/1.73 M^2
EST. GFR  (NON AFRICAN AMERICAN): 30.3 ML/MIN/1.73 M^2
GLUCOSE SERPL-MCNC: 229 MG/DL (ref 70–110)
POTASSIUM SERPL-SCNC: 5 MMOL/L (ref 3.5–5.1)
PROT SERPL-MCNC: 6 G/DL (ref 6–8.4)
SODIUM SERPL-SCNC: 140 MMOL/L (ref 136–145)

## 2019-08-06 NOTE — PROGRESS NOTES
Subjective:      Patient ID: Annika Mac is a 68 y.o. female.    Chief Complaint:  Diabetes    History of Present Illness  Annika Mac presents today for follow up of T2DM     Since last visit has had multiple admissions, one for symptomatic hyperglycemia and then severe hypoglycemia last week  Has also been changed from U500 to 70/30 due to cost    With regards to the diabetes:    Diagnosed: 1987. Started oral agents then NPH and Regular insulin. Converted to MDI with Lantus and Novolog in 2/2006 after knee surgery. D/c TZD r/t weight gain 7/08 and added Symlin. Stopped Symlin 2/09. Januvia added 2/10. D/C Januvia 12/10 r/t cost; resumed though pt assistance in 2016. Converted to U500 in 7/10.     Hospitalized for hypoglycemia in June 2019     Professional CGMS done:  Unable to interpret CGM as seems to have fallen off after ~48 hours and limited data  Information available reviewed and episode of mild hypoglycemia overnight x 1 and after lunch x 1  Otherwise essentially global hyperglycemia  No insulin doses documented    Confirm compliance with dosing and then consider decrease in lunchtime and dinnertime insulin dose     Known complications:  DKA-  RN-  PN-  Nephropathy-    She recently had a steroid injection for her hand & her BG have been elevated.     Current regimen:  Insulin 70/30:  Breakfast: 120 units for normal meal, 110 units for big meal  Lunch: 100 units with normal meal, 90 units large meal  Dinner: 80 units for normal meal, 70 for large meal    Not currently on Victoza 1.8 mg daily- donut hole     Glucose Monitor:  4-6 times a day testing  Log reviewed:           Hypoglycemia:  6/27/19: Glucose 34 around 1:30 PM and unresponsive at that time. It ws 44 when she woke up that morning and then corrected to 85. Did not eat again after that and took no insulin   Last insulin dose the night before, thinks she ate usual dinner    She is taking insulin AFTER meals    Denies missed doses.  Has Medicare  Extra Help     Eats 3 meals daily, + snacks  Drinks water, unsweet tea.   No formal exercise.    Education - last visit: 4/2019    Diabetes Management Status  Statin: Taking  ACE/ARB: Taking  Screening or Prevention Patient's value Goal Complete/Controlled?   HgA1C Testing and Control   Lab Results   Component Value Date    HGBA1C 8.8 (H) 08/05/2019      Annually/Less than 8% No   Lipid profile : 03/26/2019 Annually Yes   LDL control Lab Results   Component Value Date    LDLCALC 83.4 03/26/2019    Annually/Less than 100 mg/dl  Yes   Nephropathy screening Lab Results   Component Value Date    LABMICR 27.0 05/01/2019     Lab Results   Component Value Date    PROTEINUA Negative 05/23/2019    Annually Yes   Blood pressure BP Readings from Last 1 Encounters:   08/08/19 128/68    Less than 140/90 Yes   Dilated retinal exam : 10/30/2018 Annually Yes   Foot exam   : 05/18/2018 Annually Yes     With regards to hyperparathyroidism:  Results for [facility] (MRN [id number]) as of 5/6/2019 17:07   Ref. Range 10/26/2017 10:37 3/13/2018 11:32 2/13/2019 16:20   PTH Latest Ref Range: 9.0 - 77.0 pg/mL 107.0 (H)  82.0 (H)   Results for [facility] D (MRN [id number]) as of 5/6/2019 17:07   Ref. Range 4/4/2019 10:45 4/9/2019 09:48 5/1/2019 09:40   Calcium Latest Ref Range: 8.7 - 10.5 mg/dL 10.1 9.9 9.2   Albumin Latest Ref Range: 3.5 - 5.2 g/dL 3.7 3.6 3.3 (L)   Results for [facility] D (MRN [id number]) as of 5/6/2019 17:07   Ref. Range 2/13/2019 16:20   Vit D, 25-Hydroxy Latest Ref Range: 30 - 96 ng/mL 40     BMD 6/9/17  Normal spine and hip BMD. FRAX calculation does not support treatment for osteoporosis. total hip BMD declined 3% since 2008 and 10% since 2002. Lumbar spine BMD unchanged.  Recommendations:  1) Adequate calcium and Vitamin D therapy  2) Appropriate exercise  3) No need to repeat BMD in near future unless clinical change    Review of Systems   Constitutional: Negative for unexpected weight change.   Eyes: Negative for visual  disturbance.   Respiratory: Negative for shortness of breath.    Cardiovascular: Negative for chest pain.   Gastrointestinal: Negative for abdominal pain.   Endocrine: Negative for cold intolerance, heat intolerance, polydipsia, polyphagia and polyuria.   Musculoskeletal: Negative for arthralgias.   Skin: Negative for wound.   Neurological: Negative for headaches.   Hematological: Does not bruise/bleed easily.   Psychiatric/Behavioral: Negative for sleep disturbance.     Objective:   Physical Exam   Neck: No thyromegaly present.   Cardiovascular: Normal rate.   No edema present   Pulmonary/Chest: Effort normal.   Abdominal: Soft.   Vitals reviewed.  Appropriate footwear  No ulcers or wounds.   injection sites are ok. No lipo hypertropthy or atrophy    Body mass index is 51.97 kg/m².    Lab Review:   Lab Results   Component Value Date    HGBA1C 8.8 (H) 08/05/2019     Lab Results   Component Value Date    CHOL 153 03/26/2019    HDL 40 03/26/2019    LDLCALC 83.4 03/26/2019    TRIG 148 03/26/2019    CHOLHDL 26.1 03/26/2019     Lab Results   Component Value Date     08/05/2019    K 5.0 08/05/2019     08/05/2019    CO2 25 08/05/2019     (H) 08/05/2019    BUN 26 (H) 08/05/2019    CREATININE 1.7 (H) 08/05/2019    CALCIUM 9.4 08/05/2019    PROT 6.0 08/05/2019    ALBUMIN 3.4 (L) 08/05/2019    BILITOT 0.2 08/05/2019    ALKPHOS 124 08/05/2019    AST 17 08/05/2019    ALT 15 08/05/2019    ANIONGAP 8 08/05/2019    ESTGFRAFRICA 35.0 (A) 08/05/2019    EGFRNONAA 30.3 (A) 08/05/2019    TSH 1.423 05/01/2019     Assessment and Plan     1. Type 2 DM with CKD stage 4 and hypertension  Hemoglobin A1c    Comprehensive metabolic panel   2. Type 2 diabetes mellitus with diabetic polyneuropathy, with long-term current use of insulin     3. Vitamin D insufficiency     4. Uncontrolled type 2 diabetes mellitus with both eyes affected by proliferative retinopathy without macular edema, with long-term current use of insulin      5. Morbid obesity with BMI of 45.0-49.9, adult       T2DM with hyper and hypoglycemia  Recent ER visit due to hypoglycemia with loss of consciousness, unawareness  Significant insulin resistance with difficult to control glucose  Did well with U500 but unfortunately not able to afford at this time so on 70/30    -- long discussion today about taking insulin as long as BG >120 and she is going to eat.  Also discussed procedure to follow if BG <70, because she is having lows and rebound hyperglycemia.     New Regimen:  Insulin 70/30:  Breakfast: 120 units for normal meal, 110 units for big meal  Lunch: 90 units with normal meal, 80 units large meal  Dinner: 70 units for normal meal, 60 for large meal    Will try to get Dexcom given significant glucose variability with recent episode of hypoglycemia with loss of consciousness  -- Reviewed goals of therapy are to get the best control we can without hypoglycemia  -- Reviewed patient's current insulin regimen. Clarified proper insulin dose and timing in relation to meals, etc. Insulin injection sites and proper rotation instructed.    -- Advised frequent self blood glucose monitoring.  Patient encouraged to document glucose results and bring them to every clinic visit    -- Hypoglycemia precautions discussed. Instructed on precautions before driving.    -- Call for Bg repeatedly < 90 or > 180.   -- Close adherence to lifestyle changes recommended.   -- Periodic follow ups for eye evaluations, foot care and dental care suggested.    Stage 4 CKD  Contributes to risk for hypoglycemia  Glycemic control as above  Cont to follow with nephrology    Morbid obesity  Significant insulin resistance  Discussed dietary modification    Follow up in about 3 months (around 11/8/2019).  Labs prior to next appointment with me

## 2019-08-07 ENCOUNTER — CLINICAL SUPPORT (OUTPATIENT)
Dept: REHABILITATION | Facility: HOSPITAL | Age: 69
End: 2019-08-07
Payer: MEDICARE

## 2019-08-07 DIAGNOSIS — M62.81 MUSCLE WEAKNESS: ICD-10-CM

## 2019-08-07 DIAGNOSIS — M25.632 JOINT STIFFNESS OF BOTH WRISTS: ICD-10-CM

## 2019-08-07 DIAGNOSIS — M25.631 JOINT STIFFNESS OF BOTH WRISTS: ICD-10-CM

## 2019-08-07 DIAGNOSIS — R29.3 POSTURE IMBALANCE: ICD-10-CM

## 2019-08-07 DIAGNOSIS — M25.532 PAIN IN BOTH WRISTS: ICD-10-CM

## 2019-08-07 DIAGNOSIS — R26.9 GAIT ABNORMALITY: ICD-10-CM

## 2019-08-07 DIAGNOSIS — R29.898 WEAKNESS OF BOTH LOWER EXTREMITIES: ICD-10-CM

## 2019-08-07 DIAGNOSIS — M25.531 PAIN IN BOTH WRISTS: ICD-10-CM

## 2019-08-07 DIAGNOSIS — Z74.09 DECREASED FUNCTIONAL MOBILITY AND ENDURANCE: ICD-10-CM

## 2019-08-07 PROCEDURE — 97110 THERAPEUTIC EXERCISES: CPT | Mod: PO

## 2019-08-07 PROCEDURE — 97018 PARAFFIN BATH THERAPY: CPT | Mod: PO

## 2019-08-07 PROCEDURE — 97140 MANUAL THERAPY 1/> REGIONS: CPT | Mod: PO

## 2019-08-07 NOTE — PROGRESS NOTES
"  Physical Therapy Daily Treatment Note     Name: Annika Mac  Clinic Number: [id number]    Therapy Diagnosis:   Encounter Diagnoses   Name Primary?    Weakness of both lower extremities     Decreased functional mobility and endurance     Gait abnormality     Posture imbalance      Physician: Irena Berger PA-C    Visit Date: 8/7/2019    Physician Orders: PT Eval and Treat: Lumbar protocol with home exercises. ROM, stretching lumbar and abdominal musculature. Aerobic condidtioning, aqua therapy , with modalities including ultrasound, massage, dry needling PRN. 3 X week for 6 weeks.  Medical Diagnosis from Referral: Spondylolisthesis, unspecified spinal region; Lumbar radiculopathy  Evaluation Date: 7/15/2019  Authorization Period Expiration: 07/02/2020  Plan of Care Expiration: 09/15/19  Visit # / Visits authorized: 8/50    Time In: 2:10 am   Time Out: 3:00 pm  Total Billable Time: 40 minutes    Precautions: Standard    Subjective     Pt reports: her pain in her low back is better today but continues with a lot of pain all over.  She was compliant with home exercise program.  Response to previous treatment: mild soreness   Functional change: n/a     Pain: 5/10  Location: bilateral back      Objective     Annika received therapeutic exercises to develop strength, endurance, ROM, flexibility and core stabilization for 40 minutes including:      Date  08/07/19 08/05/19 07/31/19 07/29/19 07/24/19 07/22/19 07/17/19   VISIT 8/50 7/50 6/50 5/50 4/50 3/50 2/10   POC EXP. DATE 09/15/19 09/15/19 09/15/19 09/15/19 09/15/19 09/15/19       09/15/19   VISIT AMOUNT  MEDICARE TOTAL 90.96  689.28 60.64  598.32 90.96   537.68 90.96  446.72 90.96  355.76 90.96  264.80 60.64  173.84   FACE-TO-FACE 08/15/19 08/15/19 08/15/19 08/15/19 08/15/19 08/15/19 08/15/19             SEATED:          HSS w/ strap - sitting 10 x 10" 10 x 10" sitting 10 x 10" sitting  10 x 10" sitting  10 x 10" sitting 10 x 10" sitting 10 x 10"   TA's - sitting " "20 x 5" 20 x 5" 20 x 5" 20 x 5" 20 x 3" 20 x 3" sitting 20 x 3"   LTR - sitting 3 x 10 3 x 10 sitting 3 x 10 sitting  3 x 10 sitting  2 x 10 sitting  2 x 10 sitting 2 x 10   Hip abduction - sitting 2 x 10 GTB 3 x 10 RTB 3 x 10 RTB 3 x 10 RTB 2 x 10 RTB 1 x 15 RTB NT   Hip adduction - sitting 30 x 5" w/ ball 3 x 10 x 5" 3 x 10 x 5" 3 x 10 x 5" 2 x 10 x 3" 1 x 15 w/ball NT   LAQs 2 x 10 x 1.5# 3 x 10 x 1# 3 x 10 x 1# 1 x 25 x 1# 2 x 10 2 x 10 2 x 10   Seated Hip Flex 2 x 10 x 1.5# 3 x 10 x 1# 3 x 10 x 1# 1 x 25 x 1# 2 x 10 2 x 10 2 x 10   HS curls 2 x 10 GTB 2 x 10 GTB   3 x 10 x RTB 2 x 10 RTB 2 x 10 RTB 2 x 10             STANDING:          Tandem Walking in // 4 x 8 ft Not today NT 4 x 8 ft 4 x 8 ft 4 x 8 ft 3L   Tandem Stance  Not today Not today NT 2 x 10" 2 x 30" 2 x 30" 2 x 30"   SL Balance  Not today Not today NT 3 x 10" 3 x 10" 3 x 10" 3 x 10"   Hip ABD 2 x 10 2 x 10 1 x 15 1 x 15 1 x 10     Hip Flex 2 x 10 1 x 15 1 x 15 1 x 15 1 x 10     Hip Ext 2 x 10 2 x 10 1 x 15 1 x 15 1 x 10     Mini Squats in // 2 x 10 1 x 10 1 x 10 1 x 15 1 x 10     Heel Raises  2 x 10 2 x 10 2 x 10       Side stepping in // 6 x 8 ft                    Initials GWA 2/6 GWA 1/6 BJ BJ BJ GWA 1/6 BJ     Anna received a cold pack x 10 minutes to her low back.    Home Exercises Provided and Patient Education Provided     Education provided:   - limit the distance she walks, to build up to longer distances over time.    Written Home Exercises Provided: yes.  Exercises were reviewed and Annika was able to demonstrate them prior to the end of the session.  Annika demonstrated good  understanding of the education provided.      See EMR under Patient Instructions for exercises provided 7/22/2019.    Assessment     Patient performed above exercises with cues for proper execution of exercises and had no difficulty with new exercises added along with today's progressions.       Annika is progressing well towards her goals.   Pt prognosis is Fair. "     Pt will continue to benefit from skilled outpatient physical therapy to address the deficits listed in the problem list box on initial evaluation, provide pt/family education and to maximize pt's level of independence in the home and community environment.     Pt's spiritual, cultural and educational needs considered and pt agreeable to plan of care and goals.     Anticipated barriers to physical therapy: none    Goals:   Short Term Goals: 4 weeks   Patient will be able to resume supine position for 30 minutes, when in bed in order to get a full night of rest. IN PROGRESS  Patient will be able to stand for 30 mins with min-no pain/difficulty in order to cook and clean dishes. IN PROGRESS  Increase (B) Hamstring flexibility a 10 degrees in order to reduce stress of low back. IN PROGRESS    Long Term Goals: 8 weeks   Patient will be able to ambulated for desired distance with min-no pain/difficulty using rolling walker. IN PROGRESS'  Patient will be able to resume supine position for 1 hour, when in bed in order to get a full night of rest. IN PROGRESS  Patient will be able to sit for 1 hour with min-no difficulty in order to watch movie with her family. IN PROGRESS  Patient will be able to stand for 1 hour with min-no pain/difficulty in order to cook and clean dishes. IN PROGRESS    Plan     Increase reps on sitting exercises next visit.     Td Vargas, PTA

## 2019-08-07 NOTE — PROGRESS NOTES
Outpatient Therapy Updated Plan of Care     Visit Date: 8/7/2019  Name: Annika Mac  Clinic Number: [id number]    Therapy Diagnosis:   Encounter Diagnoses   Name Primary?    Pain in both wrists     Joint stiffness of both wrists     Muscle weakness      Physician: Marky Hagen MD    Physician Orders: Eval & Treat  Medical Diagnosis: B de Leticia's  Evaluation Date: 06/19/19  Insurance Authorization Period Expiration: 06/13/2020  Date of Return to MD: 09/11/19    Total Visits Received: 12  Cancelled Visits: 0  No Show Visits: 0    Current Certification Period:  06/19/19 to 08/02/19  Precautions:  Standard  Visits from Evaluation Date:  12  Functional Level Prior to Evaluation:  Independent  FOTO: 52%    Subjective     Update: Patient feels condition is improving, but condition has not completely resolved.  Patient continues to have pain and edema, but mostly in her L wrist/hand    Objective     Update:   Edema. Measured in centimeters.    6/19/2019 6/19/2019 08.7/07/19 08/07/19     Left Right Left Right   Wrist Crease 18.9 19.2 18.3 18.7   Mid palm 20.8 21.4 21.0 21.4   MCPs 20.0 19.5 19.7 21.0      Edema. Measured in centimeters.    6/19/2019 6/19/2019       Left Right Left Right   Thumb:        Prox. Phalanx 7.4 7.5 6.5 7.0   IP 7.1 7.4 7.2 7.3       06/19/19 06/19/19 08/08/19 08/07/19     Left Right Left Right   Wrist E/F 65* / 55* 60 / 65 * 65* / 65 * 60 * / 60 *   Wrist RD/UD 15* / 30* 15* / 45 21* / 35* 20 * / 45 *   Thumb R/P Abd 35* / 43* 50 / 50 44* / 50 * 53 * / 50 *   Thumb MP flex 30* 60 36 54   Thumb IP flex 31* 53 45 42   Thumb JASSO's 61 113     Thumb [place] To SF DPC 3.0 cm from SF DPC * To SF DPC To SF DPC   * pain with testing     Sensation: Numbness & tingling in L wrist to thumb tip moderate; intact for light touch & temperature, B'ly      Strength (Dyanmometer) and Pinch Strength (Pinch Gauge)  Measured in pounds and psi. Average of three trials.    6/19/2019 6/19/2019 08/07/19 08/07/19      Left Right Left  Right   Rung II 10* 54 35 * 55 *   Watts Pinch 6* 15* 10* 18 *   3pt Pinch 1* 7* 7 * 12 *   2pt Pinch NT 9* 3 * 6 *   * pain with testing      Assessment     Update: Patient has made good progress towards all goals set at initial evaluation, including decreased pain & edema, with increased ROM, strength and coordination of B wrist/hands for improved functioning in ADL/IADL's.     Goals:  Short Term Goals: (in 2 weeks-07/02/19)  1) Patient will be independent in HEP-met  2) Decrease pain in B wrist/thumbs to no more than 3-4/10 worst during ADL/IADL's-met for R wrist/hand; part met for L wrist/hand   3) Increase AROM in L wrist flex & UD by 5 degrees for improved functioning in ADL/IADL's-met  4) Increase JASSO L thumb by 10 degrees for improved functioning in ADL/IADL's-met  5) Increase B  strength by 5-8 psi for increased functional use-met  6) Decrease edema in B wrist by .2-.3 cm-met     Long Term Goals: (in 6 weeks-08/02/19)  1) Decrease pain in B wrist/thumb to no more than 1-2/10 worst with light to moderate ADL/IADL's-ongoing   2) Increase AROM of B wrist/thumb to WFL for increased functioning in ADL/IADL's-part met  3) Increase strength in B  by 30% of initial measures for improved functioning in ADL/IADL's-part met  4) Increased functioning in ADL/IADL's, as evidenced by a FOTO impairment rating of no more than 30%-ongoing  5) Decrease edema in B wrist to trace or none-ongoing    Previous Short Term Goals Status:   5/6 STG's met  New Short Term Goals Status:   Continue unmet STG and progress to LTG's  Long Term Goal Status:   continue per initial plan of care.  Reasons for Recertification of Therapy:   Updated Plan of Care needed    Plan     Updated Certification Period: 8/7/2019 to 09/20/19    Recommended Treatment Plan: 2 times per week for 6 weeks: Manual Therapy, Moist Heat/ Ice, Neuromuscular Re-ed, Orthotic Management and Training, Paraffin, Patient Education, Self Care,  Therapeutic Activites, Therapeutic Exercise and Ultrasound     Other Recommendations: continue with pre-ling orthotic wear at all time    Pratik Veloz, FRITZ  8/7/2019      I CERTIFY THE NEED FOR THESE SERVICES FURNISHED UNDER THIS PLAN OF TREATMENT AND WHILE UNDER MY CARE    Physician's comments:        Physician's Signature: ___________________________________________________      Occupational Therapy Daily Treatment Note     Date: 8/7/2019  Name: Annika aMc  Clinic Number: [id number]    Therapy Diagnosis:   Encounter Diagnoses   Name Primary?    Pain in both wrists     Joint stiffness of both wrists     Muscle weakness      Physician: Marky Hagen MD      Visit # / Visits authorized: 13 / 50    Time In: 1:05 pm  Time Out: 2:10 pm   Total Billable Time:  65 minutes (P, 1MT, 2TE)  Precautions:  Standard and Diabetes      Subjective     Pt reports:  No significant change in pain since last treatment  Response to previous treatment:No adverse reactions  Functional change: able to use both wrist/hands in light self-care tasks with splints donned and less pain; able to sleep through the night with less pain    Pain: R=3/10;  L 5-6/10 pre-tx  Location: bilateral wrists/hands      Objective     Annika received supervised modality for 10 minutes, as follows:  -Paraffin w/ MHP to B wrist/hands, pre-tx to decrease pain & increase tissue extensibility    Annika received the following manual therapy techniques for 20 minutes:   -Edema mgmt w/ lymphatic drainage technique;  Deep STM, including MFR, CFM, TPR & scar mgmt to B forearm/wrist/hands, using hand techniques and IASTM tools to increase blood flow/circulation, improve soft tissue pliability and decrease pain.  -Kinesiotapng, for de Quervain's taping pattern L wrist/hand     Annika received therapeutic exercises for 35 minutes direct care including:  B wrist/hand    Dexterciser  3 min   Isospheres 3 min   9 Peg  1 trial        T-putty: yellow   -Molding 3 min each    -grasp/manipulation (not today) 3 reps each   -dowel digs 3 min   -log rolls w/ tripod pinch (not today) 1 log each       Home Exercises and Education Provided     Education provided:   - Reviewed HEP    Written Home Exercises Provided: Patient instructed to cont prior HEP.  Exercises were reviewed and Annika was able to demonstrate them prior to the end of the session.  Annika demonstrated good  understanding of the HEP provided.     See EMR under Patient Instructions for exercises provided prior visit.        Assessment     See above assessment for details     Annika is progressing well towards her goals and there are no updates to goals at this time. Pt prognosis is Good.     Pt will continue to benefit from skilled outpatient occupational therapy to address the deficits listed in the problem list on initial evaluation provide pt/family education and to maximize pt's level of independence in the home and community environment.     Anticipated barriers to occupational therapy: Arthritis, Diabetes    Pt's spiritual, cultural and educational needs considered and pt agreeable to plan of care and goals.      Plan     See above Plan for details        Pratik Veloz, OT

## 2019-08-08 ENCOUNTER — OFFICE VISIT (OUTPATIENT)
Dept: ENDOCRINOLOGY | Facility: CLINIC | Age: 69
End: 2019-08-08
Payer: MEDICARE

## 2019-08-08 VITALS
HEART RATE: 74 BPM | DIASTOLIC BLOOD PRESSURE: 68 MMHG | WEIGHT: 293 LBS | HEIGHT: 65 IN | SYSTOLIC BLOOD PRESSURE: 128 MMHG | BODY MASS INDEX: 48.82 KG/M2

## 2019-08-08 DIAGNOSIS — N18.4 TYPE 2 DM WITH CKD STAGE 4 AND HYPERTENSION: Primary | ICD-10-CM

## 2019-08-08 DIAGNOSIS — I12.9 TYPE 2 DM WITH CKD STAGE 4 AND HYPERTENSION: Primary | ICD-10-CM

## 2019-08-08 DIAGNOSIS — Z79.4 TYPE 2 DIABETES MELLITUS WITH DIABETIC POLYNEUROPATHY, WITH LONG-TERM CURRENT USE OF INSULIN: ICD-10-CM

## 2019-08-08 DIAGNOSIS — E55.9 VITAMIN D INSUFFICIENCY: ICD-10-CM

## 2019-08-08 DIAGNOSIS — E66.01 MORBID OBESITY WITH BMI OF 45.0-49.9, ADULT: ICD-10-CM

## 2019-08-08 DIAGNOSIS — E11.42 TYPE 2 DIABETES MELLITUS WITH DIABETIC POLYNEUROPATHY, WITH LONG-TERM CURRENT USE OF INSULIN: ICD-10-CM

## 2019-08-08 DIAGNOSIS — E11.22 TYPE 2 DM WITH CKD STAGE 4 AND HYPERTENSION: Primary | ICD-10-CM

## 2019-08-08 PROCEDURE — 99999 PR PBB SHADOW E&M-EST. PATIENT-LVL III: CPT | Mod: PBBFAC,,, | Performed by: NURSE PRACTITIONER

## 2019-08-08 PROCEDURE — 99999 PR PBB SHADOW E&M-EST. PATIENT-LVL III: ICD-10-PCS | Mod: PBBFAC,,, | Performed by: NURSE PRACTITIONER

## 2019-08-08 PROCEDURE — 99213 OFFICE O/P EST LOW 20 MIN: CPT | Mod: PBBFAC | Performed by: NURSE PRACTITIONER

## 2019-08-08 PROCEDURE — 99214 OFFICE O/P EST MOD 30 MIN: CPT | Mod: S$PBB,,, | Performed by: NURSE PRACTITIONER

## 2019-08-08 PROCEDURE — 99214 PR OFFICE/OUTPT VISIT, EST, LEVL IV, 30-39 MIN: ICD-10-PCS | Mod: S$PBB,,, | Performed by: NURSE PRACTITIONER

## 2019-08-12 ENCOUNTER — CLINICAL SUPPORT (OUTPATIENT)
Dept: REHABILITATION | Facility: HOSPITAL | Age: 69
End: 2019-08-12
Payer: MEDICARE

## 2019-08-12 DIAGNOSIS — M25.632 JOINT STIFFNESS OF BOTH WRISTS: ICD-10-CM

## 2019-08-12 DIAGNOSIS — M25.631 JOINT STIFFNESS OF BOTH WRISTS: ICD-10-CM

## 2019-08-12 DIAGNOSIS — Z74.09 DECREASED FUNCTIONAL MOBILITY AND ENDURANCE: ICD-10-CM

## 2019-08-12 DIAGNOSIS — M25.531 PAIN IN BOTH WRISTS: ICD-10-CM

## 2019-08-12 DIAGNOSIS — M25.532 PAIN IN BOTH WRISTS: ICD-10-CM

## 2019-08-12 DIAGNOSIS — R29.3 POSTURE IMBALANCE: ICD-10-CM

## 2019-08-12 DIAGNOSIS — R29.898 WEAKNESS OF BOTH LOWER EXTREMITIES: ICD-10-CM

## 2019-08-12 DIAGNOSIS — R26.9 GAIT ABNORMALITY: ICD-10-CM

## 2019-08-12 DIAGNOSIS — M62.81 MUSCLE WEAKNESS: ICD-10-CM

## 2019-08-12 PROCEDURE — 97018 PARAFFIN BATH THERAPY: CPT | Mod: PO

## 2019-08-12 PROCEDURE — 97140 MANUAL THERAPY 1/> REGIONS: CPT | Mod: PO

## 2019-08-12 PROCEDURE — 97110 THERAPEUTIC EXERCISES: CPT | Mod: PO

## 2019-08-12 NOTE — PROGRESS NOTES
"  Physical Therapy Daily Treatment Note     Name: Annika Mac  Clinic Number: [id number]    Therapy Diagnosis:        Encounter Diagnoses   Name Primary?    Weakness of both lower extremities      Decreased functional mobility and endurance      Gait abnormality      Posture imbalance      Physician: Irena Berger PA-C    Visit Date: 8/12/2019    Physician Orders: PT Eval and Treat: Lumbar protocol with home exercises. ROM, stretching lumbar and abdominal musculature. Aerobic condidtioning, aqua therapy , with modalities including ultrasound, massage, dry needling PRN. 3 X week for 6 weeks.  Medical Diagnosis from Referral: Spondylolisthesis, unspecified spinal region; Lumbar radiculopathy  Evaluation Date: 7/15/2019  Authorization Period Expiration: 07/02/2020  Plan of Care Expiration: 09/15/19  Visit # / Visits authorized: 9/50    Time In: 2:00 pm   Time Out: 2:30 pm  Total Billable Time: 30 minutes    Precautions: Standard    Subjective     Pt reports: experiencing her usual pain today patient reports she needs to leave by 2:30 today.   She was compliant with home exercise program.  Response to previous treatment: mild soreness   Functional change: n/a     Pain: 5/10  Location: bilateral back      Objective     Annika received therapeutic exercises to develop strength, endurance, ROM, flexibility and core stabilization for 30 minutes including:      Date  08/12/10 08/07/19 08/05/19 07/31/19 07/29/19 07/24/19 07/22/19 07/17/19   VISIT 9/50 8/50 7/50 6/50 5/50 4/50 3/50 2/10   POC EXP. DATE 09/15/19 09/15/19 09/15/19 09/15/19 09/15/19 09/15/19 09/15/19       09/15/19   VISIT AMOUNT  MEDICARE TOTAL 60.64  749.92 90.96  689.28 60.64  598.32 90.96   537.68 90.96  446.72 90.96  355.76 90.96  264.80 60.64  173.84   FACE-TO-FACE 08/15/19 08/15/19 08/15/19 08/15/19 08/15/19 08/15/19 08/15/19 08/15/19              SEATED:           HSS w/ strap - sitting 10 x 10" 10 x 10" 10 x 10" sitting 10 x 10" sitting  10 x 10" " "sitting  10 x 10" sitting 10 x 10" sitting 10 x 10"   TA's - sitting 20 x 5" 20 x 5" 20 x 5" 20 x 5" 20 x 5" 20 x 3" 20 x 3" sitting 20 x 3"   LTR - sitting 3 x 10  3 x 10 3 x 10 sitting 3 x 10 sitting  3 x 10 sitting  2 x 10 sitting  2 x 10 sitting 2 x 10   Hip abduction - sitting 2 x 10 GTB 2 x 10 GTB 3 x 10 RTB 3 x 10 RTB 3 x 10 RTB 2 x 10 RTB 1 x 15 RTB NT   Hip adduction - sitting 30 x 5" w/ball 30 x 5" w/ ball 3 x 10 x 5" 3 x 10 x 5" 3 x 10 x 5" 2 x 10 x 3" 1 x 15 w/ball NT   LAQs 2 x 10 x 1.5# 2 x 10 x 1.5# 3 x 10 x 1# 3 x 10 x 1# 1 x 25 x 1# 2 x 10 2 x 10 2 x 10   Seated Hip Flex 2 x 10 x 1.5# 2 x 10 x 1.5# 3 x 10 x 1# 3 x 10 x 1# 1 x 25 x 1# 2 x 10 2 x 10 2 x 10   HS curls  2 x 10 GTB 2 x 10 GTB   3 x 10 x RTB 2 x 10 RTB 2 x 10 RTB 2 x 10              STANDING:           Tandem Walking in // NT 4 x 8 ft Not today NT 4 x 8 ft 4 x 8 ft 4 x 8 ft 3L   Tandem Stance  NT Not today Not today NT 2 x 10" 2 x 30" 2 x 30" 2 x 30"   SL Balance  NT Not today Not today NT 3 x 10" 3 x 10" 3 x 10" 3 x 10"   Hip ABD NT 2 x 10 2 x 10 1 x 15 1 x 15 1 x 10     Hip Flex NT 2 x 10 1 x 15 1 x 15 1 x 15 1 x 10     Hip Ext NT 2 x 10 2 x 10 1 x 15 1 x 15 1 x 10     Mini Squats in // NT 2 x 10 1 x 10 1 x 10 1 x 15 1 x 10     Heel Raises  NT 2 x 10 2 x 10 2 x 10       Side stepping in // NT 6 x 8 ft                     Initials BJ GWA 2/6 GWA 1/6 BJ BJ BJ GWA 1/6 BJ     Anna received a cold pack x 10 minutes to her low back. NOT TODAY    Home Exercises Provided and Patient Education Provided     Education provided:   - Replace cane     Written Home Exercises Provided: yes.  Exercises were reviewed and Annika was able to demonstrate them prior to the end of the session.  Annika demonstrated good  understanding of the education provided.      See EMR under Patient Instructions for exercises provided 7/22/2019.    Assessment     Patient performed above exercises with cues for proper execution of exercises and had no difficulty. Patient had " to leave at 2:30 today, thus all planned therex was not completed today.     Annika is progressing well towards her goals.   Pt prognosis is Fair.     Pt will continue to benefit from skilled outpatient physical therapy to address the deficits listed in the problem list box on initial evaluation, provide pt/family education and to maximize pt's level of independence in the home and community environment.     Pt's spiritual, cultural and educational needs considered and pt agreeable to plan of care and goals.     Anticipated barriers to physical therapy: none    Goals:   Short Term Goals: 4 weeks   Patient will be able to resume supine position for 30 minutes, when in bed in order to get a full night of rest. IN PROGRESS  Patient will be able to stand for 30 mins with min-no pain/difficulty in order to cook and clean dishes. IN PROGRESS  Increase (B) Hamstring flexibility a 10 degrees in order to reduce stress of low back. IN PROGRESS    Long Term Goals: 8 weeks   Patient will be able to ambulated for desired distance with min-no pain/difficulty using rolling walker. IN PROGRESS'  Patient will be able to resume supine position for 1 hour, when in bed in order to get a full night of rest. IN PROGRESS  Patient will be able to sit for 1 hour with min-no difficulty in order to watch movie with her family. IN PROGRESS  Patient will be able to stand for 1 hour with min-no pain/difficulty in order to cook and clean dishes. IN PROGRESS    Plan     Increase reps on sitting exercises next visit.     Muriel Cotton, PT, DPT

## 2019-08-12 NOTE — PROGRESS NOTES
Occupational Therapy Daily Treatment Note     Date: 8/12/2019  Name: Annika Mac  Clinic Number: [id number]    Therapy Diagnosis:   Encounter Diagnoses   Name Primary?    Pain in both wrists     Joint stiffness of both wrists     Muscle weakness      Physician: Marky Hagen MD    Physician Orders: Eval & Treat  Medical Diagnosis: B de Quervain's  Evaluation Date: 06/19/19  Insurance Authorization Period Expiration: 06/13/2020  Date of Return to MD: 09/11/19      Visit # / Visits authorized: 13 / 50    Time In: 1:00 pm  Time Out: 2:00 pm   Total Billable Time:  40 minutes (P, 1MT, 1TE)  Precautions:  Standard and Diabetes      Subjective     Pt reports:  A decrease in pain in B wrist/hand  Response to previous treatment:No adverse reactions  Functional change: able to use both wrist/hands in light self-care tasks    Pain: R=1-2/10;  L 4-5/10 pre-tx  Location: bilateral wrists/hands      Objective     Annika received supervised modality for 10 minutes, as follows:  -Paraffin w/ MHP to B wrist/hands, pre-tx to decrease pain & increase tissue extensibility    Annika received the following manual therapy techniques for 15 minutes:   -Edema mgmt w/ lymphatic drainage technique;  Deep STM, including MFR, CFM, TPR & scar mgmt to B forearm/wrist/hands, using hand techniques and IASTM tools to increase blood flow/circulation, improve soft tissue pliability and decrease pain.  -Kinesiotapng, for de Quervain's taping pattern L wrist/hand     Annika received therapeutic exercises for 20 minutes direct care and 15 minutes of supervised care  including:  B wrist/hand    Dexterciser  3 min   Isospheres 3 min   9 Peg  1 trial each hand   Ergo Gripper 10 reps, 3 red & 2 yellow bands       T-putty: yellow   -Molding 3 min each   -grasp/manipulation (not today) 3 reps each   -dowel digs 3 min   -log rolls w/ tripod pinch 2 logs       Home Exercises and Education Provided     Education provided:   - Reviewed HEP    Written Home  Exercises Provided: Patient instructed to cont prior HEP.  Exercises were reviewed and Annika was able to demonstrate them prior to the end of the session.  Annika demonstrated good  understanding of the HEP provided.     See EMR under Patient Instructions for exercises provided prior visit.        Assessment     Patient with less pain today. She is tolerating treatment well. Annika is progressing well towards her goals and there are no updates to goals at this time. Pt prognosis is Good.     Pt will continue to benefit from skilled outpatient occupational therapy to address the deficits listed in the problem list on initial evaluation provide pt/family education and to maximize pt's level of independence in the home and community environment.     Anticipated barriers to occupational therapy: Arthritis, Diabetes    Pt's spiritual, cultural and educational needs considered and pt agreeable to plan of care and goals.    Goals:  Short Term Goals: (in 2 weeks-07/02/19)  1) Patient will be independent in HEP-met  2) Decrease pain in B wrist/thumbs to no more than 3-4/10 worst during ADL/IADL's-met for R wrist/hand; part met for L wrist/hand   3) Increase AROM in L wrist flex & UD by 5 degrees for improved functioning in ADL/IADL's-met  4) Increase JASSO L thumb by 10 degrees for improved functioning in ADL/IADL's-met  5) Increase B  strength by 5-8 psi for increased functional use-met  6) Decrease edema in B wrist by .2-.3 cm-met     Long Term Goals: (in 6 weeks-08/02/19)  1) Decrease pain in B wrist/thumb to no more than 1-2/10 worst with light to moderate ADL/IADL's-ongoing   2) Increase AROM of B wrist/thumb to WFL for increased functioning in ADL/IADL's-part met  3) Increase strength in B  by 30% of initial measures for improved functioning in ADL/IADL's-part met  4) Increased functioning in ADL/IADL's, as evidenced by a FOTO impairment rating of no more than 30%-ongoing  5) Decrease edema in B wrist to trace or  none-ongoing        Plan     Updated Certification Period: 8/12/2019 to 09/20/19    Recommended Treatment Plan: 2 times per week for 6 weeks: Manual Therapy, Moist Heat/ Ice, Neuromuscular Re-ed, Orthotic Management and Training, Paraffin, Patient Education, Self Care, Therapeutic Activites, Therapeutic Exercise and Ultrasound     Other Recommendations: continue with pre-ling orthotic wear at all time         Pratik Veloz, OT

## 2019-08-14 ENCOUNTER — CLINICAL SUPPORT (OUTPATIENT)
Dept: REHABILITATION | Facility: HOSPITAL | Age: 69
End: 2019-08-14
Payer: MEDICARE

## 2019-08-14 DIAGNOSIS — M25.532 PAIN IN BOTH WRISTS: ICD-10-CM

## 2019-08-14 DIAGNOSIS — R26.9 GAIT ABNORMALITY: ICD-10-CM

## 2019-08-14 DIAGNOSIS — R29.898 WEAKNESS OF BOTH LOWER EXTREMITIES: ICD-10-CM

## 2019-08-14 DIAGNOSIS — M62.81 MUSCLE WEAKNESS: ICD-10-CM

## 2019-08-14 DIAGNOSIS — Z74.09 DECREASED FUNCTIONAL MOBILITY AND ENDURANCE: ICD-10-CM

## 2019-08-14 DIAGNOSIS — M25.531 PAIN IN BOTH WRISTS: ICD-10-CM

## 2019-08-14 DIAGNOSIS — M25.632 JOINT STIFFNESS OF BOTH WRISTS: ICD-10-CM

## 2019-08-14 DIAGNOSIS — R29.3 POSTURE IMBALANCE: ICD-10-CM

## 2019-08-14 DIAGNOSIS — M25.631 JOINT STIFFNESS OF BOTH WRISTS: ICD-10-CM

## 2019-08-14 PROCEDURE — 97140 MANUAL THERAPY 1/> REGIONS: CPT | Mod: PO

## 2019-08-14 PROCEDURE — 97110 THERAPEUTIC EXERCISES: CPT | Mod: PO

## 2019-08-14 PROCEDURE — 97018 PARAFFIN BATH THERAPY: CPT | Mod: PO

## 2019-08-14 NOTE — PROGRESS NOTES
Physical Therapy Daily Treatment Note     Name: Annika Mac  Clinic Number: [id number]    Therapy Diagnosis:        Encounter Diagnoses   Name Primary?    Weakness of both lower extremities      Decreased functional mobility and endurance      Gait abnormality      Posture imbalance      Physician: Irena Berger PA-C    Visit Date: 8/14/2019    Physician Orders: PT Eval and Treat: Lumbar protocol with home exercises. ROM, stretching lumbar and abdominal musculature. Aerobic condidtioning, aqua therapy , with modalities including ultrasound, massage, dry needling PRN. 3 X week for 6 weeks.  Medical Diagnosis from Referral: Spondylolisthesis, unspecified spinal region; Lumbar radiculopathy  Evaluation Date: 7/15/2019  Authorization Period Expiration: 07/02/2020  Plan of Care Expiration: 09/15/19  Visit # / Visits authorized: 9/50    Time In: 2:00 pm   Time Out: 2:38 pm  Total Billable Time: 38 minutes    Precautions: Standard    Subjective     Pt reports: she is having a severe increase in pain today and believes it is because of her sciatica. Patient is considering going to the ER today due to pain. Patient also stated she would like to skip standing exercises today.   She was compliant with home exercise program.  Response to previous treatment: mild soreness   Functional change: n/a     Pain: 9/10  Location: bilateral back      Objective     Annika received therapeutic exercises to develop strength, endurance, ROM, flexibility and core stabilization for 38 minutes including:      Date  8/14/19 08/12/10 08/07/19 08/05/19 07/31/19 07/29/19 07/24/19 07/22/19 07/17/19   VISIT 10/50 9/50 8/50 7/50 6/50 5/50 4/50 3/50 2/10   POC EXP. DATE 09/15/19 09/15/19 09/15/19 09/15/19 09/15/19 09/15/19 09/15/19 09/15/19       09/15/19   VISIT AMOUNT  MEDICARE TOTAL 90.96  931.84 60.64  749.92 90.96  689.28 60.64  598.32 90.96   537.68 90.96  446.72 90.96  355.76 90.96  264.80 60.64  173.84   FACE-TO-FACE 08/`5/19 08/15/19  "08/15/19 08/15/19 08/15/19 08/15/19 08/15/19 08/15/19 08/15/19               SEATED:            HSS w/ strap - sitting 10 x 10" 10 x 10" 10 x 10" 10 x 10" sitting 10 x 10" sitting  10 x 10" sitting  10 x 10" sitting 10 x 10" sitting 10 x 10"   TA's - sitting 20 x 5" 20 x 5" 20 x 5" 20 x 5" 20 x 5" 20 x 5" 20 x 3" 20 x 3" sitting 20 x 3"   LTR - sitting 3 x 10  3 x 10  3 x 10 3 x 10 sitting 3 x 10 sitting  3 x 10 sitting  2 x 10 sitting  2 x 10 sitting 2 x 10   Hip abduction - sitting 3 x 10 GTB 2 x 10 GTB 2 x 10 GTB 3 x 10 RTB 3 x 10 RTB 3 x 10 RTB 2 x 10 RTB 1 x 15 RTB NT   Hip adduction - sitting 30 x 5" w/ball 30 x 5" w/ball 30 x 5" w/ ball 3 x 10 x 5" 3 x 10 x 5" 3 x 10 x 5" 2 x 10 x 3" 1 x 15 w/ball NT   LAQs 3 x 10 x 1.5# 2 x 10 x 1.5# 2 x 10 x 1.5# 3 x 10 x 1# 3 x 10 x 1# 1 x 25 x 1# 2 x 10 2 x 10 2 x 10   Seated Hip Flex 3 x 10 x 1.5# 2 x 10 x 1.5# 2 x 10 x 1.5# 3 x 10 x 1# 3 x 10 x 1# 1 x 25 x 1# 2 x 10 2 x 10 2 x 10   HS curls 2 x 10 GTB  2 x 10 GTB 2 x 10 GTB   3 x 10 x RTB 2 x 10 RTB 2 x 10 RTB 2 x 10   Hip ER  1 x 15            Hip IR 1 x 15                                   STANDING:            Tandem Walking in // -- NT 4 x 8 ft Not today NT 4 x 8 ft 4 x 8 ft 4 x 8 ft 3L   Tandem Stance  -- NT Not today Not today NT 2 x 10" 2 x 30" 2 x 30" 2 x 30"   SL Balance  -- NT Not today Not today NT 3 x 10" 3 x 10" 3 x 10" 3 x 10"   Hip ABD -- NT 2 x 10 2 x 10 1 x 15 1 x 15 1 x 10     Hip Flex -- NT 2 x 10 1 x 15 1 x 15 1 x 15 1 x 10     Hip Ext -- NT 2 x 10 2 x 10 1 x 15 1 x 15 1 x 10     Mini Squats in // -- NT 2 x 10 1 x 10 1 x 10 1 x 15 1 x 10     Heel Raises  -- NT 2 x 10 2 x 10 2 x 10       Side stepping in // -- NT 6 x 8 ft                      Initials BJ BJ GWA 2/6 GWA 1/6 BJ BJ BJ GWA 1/6 BJ     Annika received a cold pack x 10 minutes to her low back. NOT TODAY    Home Exercises Provided and Patient Education Provided     Education provided:   - use RW as needed until cane is replaced     Written Home " Exercises Provided: yes.  Exercises were reviewed and Annika was able to demonstrate them prior to the end of the session.  Annika demonstrated good  understanding of the education provided.      See EMR under Patient Instructions for exercises provided 7/22/2019.    Assessment     Patient ambulated into the clinic today using RW due to a sharp increase in back pain. Patient performed exercises slowly and required more frequent rest breaks due to pain. Patient sat with heating pad during PT session per her request. Patient requested not to perform any standing therex today due to pain.     Annika is progressing well towards her goals.   Pt prognosis is Fair.     Pt will continue to benefit from skilled outpatient physical therapy to address the deficits listed in the problem list box on initial evaluation, provide pt/family education and to maximize pt's level of independence in the home and community environment.     Pt's spiritual, cultural and educational needs considered and pt agreeable to plan of care and goals.     Anticipated barriers to physical therapy: none    Goals:   Short Term Goals: 4 weeks   Patient will be able to resume supine position for 30 minutes, when in bed in order to get a full night of rest. IN PROGRESS  Patient will be able to stand for 30 mins with min-no pain/difficulty in order to cook and clean dishes. IN PROGRESS  Increase (B) Hamstring flexibility a 10 degrees in order to reduce stress of low back. IN PROGRESS    Long Term Goals: 8 weeks   Patient will be able to ambulated for desired distance with min-no pain/difficulty using rolling walker. IN PROGRESS'  Patient will be able to resume supine position for 1 hour, when in bed in order to get a full night of rest. IN PROGRESS  Patient will be able to sit for 1 hour with min-no difficulty in order to watch movie with her family. IN PROGRESS  Patient will be able to stand for 1 hour with min-no pain/difficulty in order to cook and clean  dishes. IN PROGRESS    Plan     Increase ankle weights to 2# next visit.     Muriel Cotton, PT, DPT

## 2019-08-14 NOTE — PROGRESS NOTES
Occupational Therapy Daily Treatment Note     Date: 8/14/2019  Name: Annika Mac  Clinic Number: [id number]    Therapy Diagnosis:   Encounter Diagnoses   Name Primary?    Pain in both wrists     Joint stiffness of both wrists     Muscle weakness      Physician: Marky Hagen MD    Physician Orders: Eval & Treat  Medical Diagnosis: B de Quervain's  Evaluation Date: 06/19/19  Insurance Authorization Period Expiration: 06/13/2020  Date of Return to MD: 09/11/19      Visit # / Visits authorized: 15 / 50    Time In: 1:00 pm  Time Out: 2:00 pm   Total Billable Time:  60 minutes (P, 1MT, 2TE)  Precautions:  Standard and Diabetes      Subjective     Pt reports:  Having pain in her back/buttocks today, therefore, is using her rolling walker.  Patient reports a slight increase in pain in B wrists.  Response to previous treatment:No adverse reactions  Functional change: able to use both wrist/hands in light self-care tasks    Pain: R=2-3/10;  L 5-6/10 pre-tx  Location: bilateral wrists/hands      Objective     Annika received supervised modality for 10 minutes, as follows:  -Paraffin w/ MHP to B wrist/hands, pre-tx to decrease pain & increase tissue extensibility    Annika received the following manual therapy techniques for 15 minutes:   -Edema mgmt w/ lymphatic drainage technique;  Deep STM, including MFR, CFM, TPR & scar mgmt to B forearm/wrist/hands, using hand techniques and IASTM tools to increase blood flow/circulation, improve soft tissue pliability and decrease pain.  -Kinesiotapng, for de Quervain's taping pattern L wrist/hand     Annika received therapeutic exercises for 35 minutes direct care including:  B wrist/hand    Dexterciser  3 min   Isospheres 3 min   9 Peg  1 trial each hand   Ergo Gripper 10 reps, 3 red & 2 yellow bands       T-putty: yellow   -Molding 3 min each   -grasp/manipulation (not today) 3 reps each   -dowel digs 3 min   -log rolls w/ tripod pinch 2 logs       Home Exercises and Education  Provided     Education provided:   - Reviewed HEP    Written Home Exercises Provided: Patient instructed to cont prior HEP.  Exercises were reviewed and Annika was able to demonstrate them prior to the end of the session.  Annika demonstrated good  understanding of the HEP provided.     See EMR under Patient Instructions for exercises provided prior visit.        Assessment     Patient with increased pain in B wrist today possibly due to increased weight bearing through B wrist/hands using rolling walker.  She tolerated treatment fairly well today, regardless of increased pain.  Annika is progressing well towards her goals and there are no updates to goals at this time. Pt prognosis is Good.     Pt will continue to benefit from skilled outpatient occupational therapy to address the deficits listed in the problem list on initial evaluation provide pt/family education and to maximize pt's level of independence in the home and community environment.     Anticipated barriers to occupational therapy: Arthritis, Diabetes    Pt's spiritual, cultural and educational needs considered and pt agreeable to plan of care and goals.    Goals:  Short Term Goals: (in 2 weeks-07/02/19)  1) Patient will be independent in HEP-met  2) Decrease pain in B wrist/thumbs to no more than 3-4/10 worst during ADL/IADL's-met for R wrist/hand; part met for L wrist/hand   3) Increase AROM in L wrist flex & UD by 5 degrees for improved functioning in ADL/IADL's-met  4) Increase JASSO L thumb by 10 degrees for improved functioning in ADL/IADL's-met  5) Increase B  strength by 5-8 psi for increased functional use-met  6) Decrease edema in B wrist by .2-.3 cm-met     Long Term Goals: (in 6 weeks-08/02/19)  1) Decrease pain in B wrist/thumb to no more than 1-2/10 worst with light to moderate ADL/IADL's-ongoing   2) Increase AROM of B wrist/thumb to WFL for increased functioning in ADL/IADL's-part met  3) Increase strength in B  by 30% of initial  measures for improved functioning in ADL/IADL's-part met  4) Increased functioning in ADL/IADL's, as evidenced by a FOTO impairment rating of no more than 30%-ongoing  5) Decrease edema in B wrist to trace or none-ongoing        Plan     Updated Certification Period: 8/12/2019 to 09/20/19    Recommended Treatment Plan: 2 times per week for 6 weeks: Manual Therapy, Moist Heat/ Ice, Neuromuscular Re-ed, Orthotic Management and Training, Paraffin, Patient Education, Self Care, Therapeutic Activites, Therapeutic Exercise and Ultrasound     Other Recommendations: continue with pre-ling orthotic wear at all time         Pratik Veloz, OT

## 2019-08-15 ENCOUNTER — HOSPITAL ENCOUNTER (EMERGENCY)
Facility: HOSPITAL | Age: 69
Discharge: HOME | End: 2019-08-15
Attending: EMERGENCY MEDICINE
Payer: MEDICARE

## 2019-08-15 VITALS
TEMPERATURE: 98 F | OXYGEN SATURATION: 95 % | HEART RATE: 68 BPM | WEIGHT: 293 LBS | HEIGHT: 65 IN | DIASTOLIC BLOOD PRESSURE: 59 MMHG | BODY MASS INDEX: 48.82 KG/M2 | RESPIRATION RATE: 18 BRPM | SYSTOLIC BLOOD PRESSURE: 115 MMHG

## 2019-08-15 DIAGNOSIS — M54.50 LUMBAR PAIN: Primary | ICD-10-CM

## 2019-08-15 DIAGNOSIS — G57.01 SCIATIC NERVE DISEASE, RIGHT: ICD-10-CM

## 2019-08-15 PROCEDURE — 99284 PR EMERGENCY DEPT VISIT,LEVEL IV: ICD-10-PCS | Mod: ,,, | Performed by: PHYSICIAN ASSISTANT

## 2019-08-15 PROCEDURE — 99284 EMERGENCY DEPT VISIT MOD MDM: CPT | Mod: ,,, | Performed by: PHYSICIAN ASSISTANT

## 2019-08-15 PROCEDURE — 99283 EMERGENCY DEPT VISIT LOW MDM: CPT | Mod: 25

## 2019-08-15 PROCEDURE — 25000003 PHARM REV CODE 250: Performed by: PHYSICIAN ASSISTANT

## 2019-08-15 RX ORDER — HYDROCODONE BITARTRATE AND ACETAMINOPHEN 5; 325 MG/1; MG/1
1 TABLET ORAL
Status: COMPLETED | OUTPATIENT
Start: 2019-08-15 | End: 2019-08-15

## 2019-08-15 RX ORDER — HYDROCODONE BITARTRATE AND ACETAMINOPHEN 5; 325 MG/1; MG/1
1 TABLET ORAL EVERY 4 HOURS PRN
Qty: 18 TABLET | Refills: 0 | Status: SHIPPED | OUTPATIENT
Start: 2019-08-15 | End: 2019-08-17

## 2019-08-15 RX ADMIN — HYDROCODONE BITARTRATE AND ACETAMINOPHEN 1 TABLET: 5; 325 TABLET ORAL at 10:08

## 2019-08-15 NOTE — ED NOTES
Patient identifiers verified and correct for Ms Mac  C/C: Pain to right lower back SEE NN  APPEARANCE: awake and alert in NAD.  SKIN: warm, dry and intact. No breakdown or bruising.  MUSCULOSKELETAL: Patient moving all extremities spontaneously, no obvious swelling or deformities noted. Ambulates independently.  RESPIRATORY: Denies shortness of breath.Respirations unlabored.   CARDIAC: Denies CP, 2+ distal pulses; no peripheral edema  ABDOMEN: S/ND/NT, Denies nausea  : voids spontaneously, denies difficulty  Neurologic: AAO x 4; follows commands equal strength in all extremities; denies numbness/tingling. Denies dizziness Positive back pain radiates down leg.

## 2019-08-15 NOTE — ED TRIAGE NOTES
"Patient states pain to right lower back "nerve pain " onset yesterday evening, known sciatica. Denies fevers, denies urinary symptoms, Tramadol 0530  "

## 2019-08-15 NOTE — ED PROVIDER NOTES
Encounter Date: 8/15/2019       History     Chief Complaint   Patient presents with    Back Pain     lower back since yesterday. Denies trauma. + sciatic problems.      68-year-old female with PMHx of stage 3 CKD, CHF,  Anemia, GERD, DVT, T2DM with polyneuropathy, HTN, HLP, and RUFINA who presents to the ED with c/o back pain. Per pt, she has chronic lower back pain with right sided sciatica, which acutely worsened yesterday and has been constant since. Denies trauma, injury, or heavy lifting. She described her pain as a constant sharp pain radiating from her right buttocks throughout her right leg, worse with movement, better with rest, rated 8/10 currently. She took tramadol earlier today without relief to her symptoms. She fell 2 months ago, fracturing her left wrist, which has been healing well. Denies numbness, paresthesias, weakness, fevers, chills, chest pain, SOB, abdominal pain, n/v/d, dysuria, urinary frequency, bladder/bowerl incontinence, or any other medical complaints.     The history is provided by the patient.     Review of patient's allergies indicates:   Allergen Reactions    Iodinated contrast- oral and iv dye Rash     Past Medical History:   Diagnosis Date    Allergy     Anemia 7/27/2012    Arthritis     CHF (congestive heart failure)     CKD (chronic kidney disease) stage 3, GFR 30-59 ml/min 7/27/2012    Clotting disorder     Colon polyp     Deep vein thrombophlebitis of left leg 7/27/2012    Degenerative disc disease     Diabetic peripheral neuropathy associated with type 2 diabetes mellitus 7/27/2012    GERD (gastroesophageal reflux disease)     HTN (hypertension) 7/27/2012    Hyperlipidemia 7/27/2012    Lead-induced gout of ankle or foot     right going on three weeks    Nonproliferative diabetic retinopathy of right eye 7/27/2012    Obstructive sleep apnea 7/27/2012    Osteoporosis     Proliferative diabetic retinopathy of left eye 7/27/2012    Stroke 8/1/2003    Type 2  diabetes mellitus with diabetic polyneuropathy 2012    Ulcer      Past Surgical History:   Procedure Laterality Date    CATARACT EXTRACTION W/  INTRAOCULAR LENS IMPLANT Left 3/2002    left     CATARACT EXTRACTION W/  INTRAOCULAR LENS IMPLANT Right     OD dr. olivares     SECTION      COLONOSCOPY N/A 2018    Performed [facility]an [facility] at [facility] (2ND FLR)    CYST REMOVAL  2011    left side of face    ESOPHAGOGASTRODUODENOSCOPY (EGD) N/A 2018    Performed by [facility]an Mac [facility] at [facility] (2ND FLR)    EYE SURGERY Bilateral 2012    eye implants    GANGLION CYST EXCISION  2007    Right wrist    INSERTION, IOL PROSTHESIS Right 2012    Performed by Linda Olivares MD at [facility] OR 1ST FLR    Pan Retinal Photocoagulation Bilateral     Dr. Monterroso (Proliferative Diabetic Retinopathy)    PHACOEMULSIFICATION, CATARACT Right 2012    Performed [facility] Linda [facility] at [facility] OR 1ST FLR    TOTAL ABDOMINAL HYSTERECTOMY  1982    TOTAL KNEE ARTHROPLASTY  2006    left    TRIGGER FINGER RELEASE  2009     Family History   Problem Relation Age of Onset    Diabetes Mother     Hypertension Mother     Heart disease Father     Diabetes Sister     Thyroid disease Brother     Diabetes Brother     Hypertension Brother     No Known Problems Daughter     No Known Problems Son     No Known Problems Daughter     Amblyopia Neg Hx     Blindness Neg Hx     Glaucoma Neg Hx     Macular degeneration Neg Hx     Retinal detachment Neg Hx     Strabismus Neg Hx     Colon cancer Neg Hx     Esophageal cancer Neg Hx      Social History     Tobacco Use    Smoking status: Never Smoker    Smokeless tobacco: Never Used   Substance Use Topics    Alcohol use: No    Drug use: No     Review of Systems   Constitutional: Negative for chills and fever.   HENT: Negative for congestion.    Eyes: Negative for visual disturbance.   Respiratory: Negative for  shortness of breath.    Cardiovascular: Negative for chest pain.   Gastrointestinal: Negative for abdominal pain, constipation, diarrhea, nausea and vomiting.   Genitourinary: Negative for dysuria.   Musculoskeletal: Positive for back pain. Negative for neck pain.   Skin: Negative for rash.   Neurological: Negative for dizziness, weakness, numbness and headaches.   Hematological: Does not bruise/bleed easily.   Psychiatric/Behavioral: Negative for confusion.       Physical Exam     Initial Vitals [08/15/19 1002]   BP Pulse Resp Temp SpO2   (!) 115/59 68 18 98.3 °F (36.8 °C) 95 %      MAP       --         Physical Exam    Nursing note and vitals reviewed.  Constitutional: She appears well-developed and well-nourished.   HENT:   Head: Normocephalic and atraumatic.   Eyes: Conjunctivae and EOM are normal. Pupils are equal, round, and reactive to light.   Neck: Normal range of motion. Neck supple.   Cardiovascular: Normal rate, regular rhythm and normal heart sounds.   Pulmonary/Chest: Breath sounds normal. She has no wheezes. She has no rhonchi. She has no rales.   Abdominal: Soft. There is no tenderness. There is no rebound and no guarding.   Musculoskeletal: Normal range of motion. She exhibits no edema or tenderness.   Right sided lumbar paraspinal tenderness to palpation. No midline C, T, or L midline spinal TTP. No crepitus, bony deformity, or step offs. Left wrist splint in place.    Neurological: She is alert. She has normal strength.   Normal gait. Strength and sensation intact and symmetric in upper and lower extremities. Normal speech.    Skin: Skin is warm. Capillary refill takes less than 2 seconds.   Psychiatric: She has a normal mood and affect.         ED Course   Procedures  Labs Reviewed - No data to display       Imaging Results    None          Medical Decision Making:   History:   Old Medical Records: I decided to obtain old medical records.  Old Records Summarized: records from clinic visits.        <> Summary of Records: Lumbar x-ray from July 2019 with degenerative changes with grade 1 anterolisthesis of L5 on S1. Well-circumscribed L3 sclerotic focus, which has slightly increased in size since 2014; however favored to represent a benign process such as an enlarging bone island/enostosis.  Initial Assessment:   68-year-old female with PMHx of stage 3 CKD, CHF,  Anemia, GERD, DVT, T2DM with polyneuropathy, HTN, HLP, and RUFINA who presents to the ED with c/o back pain. Pt reports chronic lower back pain with right sided sciatica, which acutely worsened yesterday and has been constant since. Denies trauma, injury, or heavy lifting. Has been taking tramadol for relief. Mildly hypotensive at 115/59. RRR. Lungs CTA bilaterally. Abdomen soft and nontender. Neurovascularly intact throughout. Right sided lumbar paraspinal tenderness to palpation. No midline C, T, or L midline spinal TTP. No crepitus, bony deformity, or step offs. Left wrist splint in place.   Differential Diagnosis:   DDx includes but is not limited to acute on chronic back pain, sciatic nerve pain, DDD, osteoarthritis.   ED Management:  Pt with acute excerbation of her chronic lower back pain. Pt has stage 3 CKD with poorly controlled DM so cannot treat with NSAIDs or steroids. Will give Norco 5-325 mg as analgesic. Pt had imaging done within the past month. No recent trauma or injury, will not repeat imaging at this time.     She is stable for discharge with instructions to follow up with her PCP within 4-5 days. Pt given prescription for short course of Norco. Strict ER return precautions given. All questions answered. Pt expressed understanding and is agreeable with the plan.     I have discussed the treatment and management of this patient with my supervising physician, and we agree on the plan of care.      Enedina Donnelly PA-C                Attending Attestation:     Physician Attestation Statement for NP/PA:   I discussed this assessment and plan  of this patient with the NP/PA, but I did not personally examine the patient. The face to face encounter was performed by the NP/PA.                     Clinical Impression:       ICD-10-CM ICD-9-CM   1. Lumbar pain M54.5 724.2   2. Sciatic nerve disease, right G57.01 355.0         Disposition:   Disposition: Discharged  Condition: Stable                        Enedina Donnelly PA-C  08/16/19 1136       Juventino Alba MD  08/16/19 [id number]

## 2019-08-15 NOTE — DISCHARGE INSTRUCTIONS
You are having an acute flare of your chronic back pain. You should take Norco as needed for pain relief. Please follow up with your primary care physician within 4-5 days to be re-evaluated. Return to the ER if you develop any worsening symptoms or any other concerning symptoms.

## 2019-08-16 ENCOUNTER — TELEPHONE (OUTPATIENT)
Dept: INTERNAL MEDICINE | Facility: CLINIC | Age: 69
End: 2019-08-16

## 2019-08-16 NOTE — TELEPHONE ENCOUNTER
----- Message from Kellie Pavon sent at 8/16/2019  1:39 PM CDT -----  Contact: self/[phone]  Pt called in regards to getting a 4-5 day urgent care f/u. She did not want the first available. She wanted to talk to the dr and see what would she like her to do.     Please advise

## 2019-08-19 ENCOUNTER — CLINICAL SUPPORT (OUTPATIENT)
Dept: REHABILITATION | Facility: HOSPITAL | Age: 69
End: 2019-08-19
Payer: MEDICARE

## 2019-08-19 ENCOUNTER — DOCUMENTATION ONLY (OUTPATIENT)
Dept: REHABILITATION | Facility: HOSPITAL | Age: 69
End: 2019-08-19

## 2019-08-19 DIAGNOSIS — R29.3 POSTURE IMBALANCE: ICD-10-CM

## 2019-08-19 DIAGNOSIS — R26.9 GAIT ABNORMALITY: ICD-10-CM

## 2019-08-19 DIAGNOSIS — Z74.09 DECREASED FUNCTIONAL MOBILITY AND ENDURANCE: ICD-10-CM

## 2019-08-19 DIAGNOSIS — R29.898 WEAKNESS OF BOTH LOWER EXTREMITIES: ICD-10-CM

## 2019-08-19 PROCEDURE — 97110 THERAPEUTIC EXERCISES: CPT | Mod: PO

## 2019-08-19 NOTE — PROGRESS NOTES
Face to Face PTA Conference performed with Td Vargas PTA regarding patient's current status, overall progress, and plan of care    Face to Face PTA Conference performed with Muriel Cotton PT regarding patient's current status, overall progress, and plan of care    Td Vargas  8/19/2019

## 2019-08-19 NOTE — PROGRESS NOTES
Physical Therapy Daily Treatment Note     Name: Annika Mac  Clinic Number: [id number]    Therapy Diagnosis:        Encounter Diagnoses   Name Primary?    Weakness of both lower extremities      Decreased functional mobility and endurance      Gait abnormality      Posture imbalance      Physician: Irena Berger PA-C    Visit Date: 8/19/2019    Physician Orders: PT Eval and Treat: Lumbar protocol with home exercises. ROM, stretching lumbar and abdominal musculature. Aerobic condidtioning, aqua therapy , with modalities including ultrasound, massage, dry needling PRN. 3 X week for 6 weeks.  Medical Diagnosis from Referral: Spondylolisthesis, unspecified spinal region; Lumbar radiculopathy  Evaluation Date: 7/15/2019  Authorization Period Expiration: 07/02/2020  Plan of Care Expiration: 09/15/19  Visit # / Visits authorized: 12/50    Time In: 2:00 pm   Time Out: 2:40 pm  Total Billable Time: 40 minutes    Precautions: Standard    Subjective     Pt reports: she went to the ER on Saturday morning because she was in so much pain. States she was given muscle relaxers to calm down her sciatic nerve pain. Patient states she has been ambulating with her 's SPC over the weekend.   She was compliant with home exercise program.  Response to previous treatment: mild soreness   Functional change: n/a     Pain: 7-8/10  Location: bilateral back      Objective     Annika received therapeutic exercises to develop strength, endurance, ROM, flexibility and core stabilization for 40 minutes including:      Date  08/19/19 8/14/19 08/12/10 08/07/19 08/05/19 07/31/19 07/29/19 07/24/19 07/22/19 07/17/19   VISIT 11/50 10/50 9/50 8/50 7/50 6/50 5/50 4/50 3/50 2/10   POC EXP. DATE 09/15/19 09/15/19 09/15/19 09/15/19 09/15/19 09/15/19 09/15/19 09/15/19 09/15/19       09/15/19   VISIT AMOUNT  MEDICARE TOTAL 90.96  1022.89   90.96  931.84 60.64  749.92 90.96  689.28 60.64  598.32 90.96   537.68 90.96  446.72 90.96  355.76  "90.96  264.80 60.64  173.84   FACE-TO-FACE 09/15/19 08/15/19 08/15/19 08/15/19 08/15/19 08/15/19 08/15/19 08/15/19 08/15/19 08/15/19                SEATED:             HSS w/ strap - sitting 10 x 10" 10 x 10" 10 x 10" 10 x 10" 10 x 10" sitting 10 x 10" sitting  10 x 10" sitting  10 x 10" sitting 10 x 10" sitting 10 x 10"   TA's - sitting 20 x 5" 20 x 5" 20 x 5" 20 x 5" 20 x 5" 20 x 5" 20 x 5" 20 x 3" 20 x 3" sitting 20 x 3"   LTR - sitting 3 x 10  3 x 10  3 x 10  3 x 10 3 x 10 sitting 3 x 10 sitting  3 x 10 sitting  2 x 10 sitting  2 x 10 sitting 2 x 10   Hip abduction - sitting 3 x 10 GTB 3 x 10 GTB 2 x 10 GTB 2 x 10 GTB 3 x 10 RTB 3 x 10 RTB 3 x 10 RTB 2 x 10 RTB 1 x 15 RTB NT   Hip adduction - sitting 25 x 5" w/ball  30 x 5" w/ball 30 x 5" w/ball 30 x 5" w/ ball 3 x 10 x 5" 3 x 10 x 5" 3 x 10 x 5" 2 x 10 x 3" 1 x 15 w/ball NT   LAQs 2 x 10 x 2# 3 x 10 x 1.5# 2 x 10 x 1.5# 2 x 10 x 1.5# 3 x 10 x 1# 3 x 10 x 1# 1 x 25 x 1# 2 x 10 2 x 10 2 x 10   Seated Hip Flex 2 x 10 x 2# 3 x 10 x 1.5# 2 x 10 x 1.5# 2 x 10 x 1.5# 3 x 10 x 1# 3 x 10 x 1# 1 x 25 x 1# 2 x 10 2 x 10 2 x 10   HS curls 2 x 10 GTB 2 x 10 GTB  2 x 10 GTB 2 x 10 GTB   3 x 10 x RTB 2 x 10 RTB 2 x 10 RTB 2 x 10   Hip ER  2 x 10 x 2# 1 x 15            Hip IR 2 x 10 x 2# 1 x 15                                     STANDING:             Tandem Walking in // 4 x 8 ft  -- NT 4 x 8 ft Not today NT 4 x 8 ft 4 x 8 ft 4 x 8 ft 3L   Tandem Stance  NT -- NT Not today Not today NT 2 x 10" 2 x 30" 2 x 30" 2 x 30"   SL Balance  NT -- NT Not today Not today NT 3 x 10" 3 x 10" 3 x 10" 3 x 10"   Hip ABD 2 x 10  -- NT 2 x 10 2 x 10 1 x 15 1 x 15 1 x 10     Hip Flex 2 x 10 -- NT 2 x 10 1 x 15 1 x 15 1 x 15 1 x 10     Hip Ext 2 x 10  -- NT 2 x 10 2 x 10 1 x 15 1 x 15 1 x 10     Mini Squats in // 2 x 10  -- NT 2 x 10 1 x 10 1 x 10 1 x 15 1 x 10     Heel Raises  2 x 10  -- NT 2 x 10 2 x 10 2 x 10       Side stepping in //  -- NT 6 x 8 ft                       Initials BJ BJ BJ ELLE " 2/6 GWA 1/6 BJ BJ BJ GWA 1/6 BJ     Annika received a hot pack x 10 minutes to her low back.     Home Exercises Provided and Patient Education Provided     Education provided:   - continue standing HEP as tolerated     Written Home Exercises Provided: yes.  Exercises were reviewed and Annika was able to demonstrate them prior to the end of the session.  Annika demonstrated good  understanding of the education provided.      See EMR under Patient Instructions for exercises provided 7/22/2019.    Assessment     Patient ambulated into the clinic today using SPC today. Patient has been using her husbands SPC over the weekend from going to the ER. Patient felt better after visiting the ER and was able to cook a full meal on Sunday with moderate pain. Patient was able to resume standing therex today due to overall lowered pain levels with minimal difficulty. Annika requested to end therapy session with 10 minutes of heat on her low back.     Annika is progressing well towards her goals.   Pt prognosis is Fair.     Pt will continue to benefit from skilled outpatient physical therapy to address the deficits listed in the problem list box on initial evaluation, provide pt/family education and to maximize pt's level of independence in the home and community environment.     Pt's spiritual, cultural and educational needs considered and pt agreeable to plan of care and goals.     Anticipated barriers to physical therapy: none    Goals:   Short Term Goals: 4 weeks   Patient will be able to resume supine position for 30 minutes, when in bed in order to get a full night of rest. IN PROGRESS  Patient will be able to stand for 30 mins with min-no pain/difficulty in order to cook and clean dishes. IN PROGRESS  Increase (B) Hamstring flexibility a 10 degrees in order to reduce stress of low back. IN PROGRESS    Long Term Goals: 8 weeks   Patient will be able to ambulated for desired distance with min-no pain/difficulty using rolling walker. IN  PROGRESS'  Patient will be able to resume supine position for 1 hour, when in bed in order to get a full night of rest. IN PROGRESS  Patient will be able to sit for 1 hour with min-no difficulty in order to watch movie with her family. IN PROGRESS  Patient will be able to stand for 1 hour with min-no pain/difficulty in order to cook and clean dishes. IN PROGRESS    Plan     Progress seated abduction to blue theraband.     Muriel Cotton, PT, DPT

## 2019-08-19 NOTE — TELEPHONE ENCOUNTER
Spoke with pt   She is following with physical medicine  Sciatica flared, reports   Some improvement with therapy and has phys med follow up   Planned   And would like to keep her physical appt in September  As scheduled but will call if needed sooner

## 2019-08-21 ENCOUNTER — PATIENT OUTREACH (OUTPATIENT)
Dept: ADMINISTRATIVE | Facility: HOSPITAL | Age: 69
End: 2019-08-21

## 2019-08-26 ENCOUNTER — CLINICAL SUPPORT (OUTPATIENT)
Dept: REHABILITATION | Facility: HOSPITAL | Age: 69
End: 2019-08-26
Payer: MEDICARE

## 2019-08-26 DIAGNOSIS — Z74.09 DECREASED FUNCTIONAL MOBILITY AND ENDURANCE: ICD-10-CM

## 2019-08-26 DIAGNOSIS — R29.898 WEAKNESS OF BOTH LOWER EXTREMITIES: ICD-10-CM

## 2019-08-26 DIAGNOSIS — R29.3 POSTURE IMBALANCE: ICD-10-CM

## 2019-08-26 DIAGNOSIS — R26.9 GAIT ABNORMALITY: ICD-10-CM

## 2019-08-26 PROCEDURE — 97110 THERAPEUTIC EXERCISES: CPT | Mod: PO

## 2019-08-26 NOTE — PROGRESS NOTES
Physical Therapy Daily Treatment Note     Name: Annika Mac  [facility] Number: [id number]    Therapy Diagnosis:        Encounter Diagnoses   Name Primary?    Weakness of both lower extremities      Decreased functional mobility and endurance      Gait abnormality      Posture imbalance      Physician: Irena Berger PA-C    Visit Date: 8/26/2019    Physician Orders: PT Eval and Treat: Lumbar protocol with home exercises. ROM, stretching lumbar and abdominal musculature. Aerobic condidtioning, aqua therapy , with modalities including ultrasound, massage, dry needling PRN. 3 X week for 6 weeks.  Medical Diagnosis from Referral: Spondylolisthesis, unspecified spinal region; Lumbar radiculopathy  Evaluation Date: 7/15/2019  Authorization Period Expiration: 07/02/2020  Plan of Care Expiration: 09/15/19  Visit # / Visits authorized: 12/50    Time In: 10:00 am   Time Out: 10:55 am   Total Billable Time: 40 minutes    Precautions: Standard    Subjective     Pt reports: she is feeling tired today and did not get much sleep last night because of her back pain and low sugar levels.   She was compliant with home exercise program.  Response to previous treatment: mild soreness   Functional change: n/a     Pain: 8/10  Location: bilateral back      Objective     Annika received therapeutic exercises to develop strength, endurance, ROM, flexibility and core stabilization for 40 minutes including:      Date  08/26/19 08/19/19 8/14/19 08/12/10 08/07/19 08/05/19 07/31/19 07/29/19 07/24/19 07/22/19 07/17/19   VISIT 12/50 11/50 10/50 9/50 8/50 7/50 6/50 5/50 4/50 3/50 2/10   POC EXP. DATE 09/15/19 09/15/19 09/15/19 09/15/19 09/15/19 09/15/19 09/15/19 09/15/19 09/15/19 09/15/19       09/15/19   VISIT AMOUNT  MEDICARE TOTAL 90.96  1113.85 90.96  1022.89   90.96  931.84 60.64  749.92 90.96  689.28 60.64  598.32 90.96   537.68 90.96  446.72 90.96  355.76 90.96  264.80 60.64  173.84   FACE-TO-FACE 09/15/19 09/15/19 08/15/19 08/15/19  "08/15/19 08/15/19 08/15/19 08/15/19 08/15/19 08/15/19 08/15/19                 SEATED:              HSS w/ strap - sitting 10 x 10"  10 x 10" 10 x 10" 10 x 10" 10 x 10" 10 x 10" sitting 10 x 10" sitting  10 x 10" sitting  10 x 10" sitting 10 x 10" sitting 10 x 10"   TA's - sitting 30 x 5" 20 x 5" 20 x 5" 20 x 5" 20 x 5" 20 x 5" 20 x 5" 20 x 5" 20 x 3" 20 x 3" sitting 20 x 3"   LTR - sitting 3 x 10  3 x 10  3 x 10  3 x 10  3 x 10 3 x 10 sitting 3 x 10 sitting  3 x 10 sitting  2 x 10 sitting  2 x 10 sitting 2 x 10   Hip abduction - sitting 2 x 10 BTB 3 x 10 GTB 3 x 10 GTB 2 x 10 GTB 2 x 10 GTB 3 x 10 RTB 3 x 10 RTB 3 x 10 RTB 2 x 10 RTB 1 x 15 RTB NT   Hip adduction - sitting 30 x 5" 25 x 5" w/ball  30 x 5" w/ball 30 x 5" w/ball 30 x 5" w/ ball 3 x 10 x 5" 3 x 10 x 5" 3 x 10 x 5" 2 x 10 x 3" 1 x 15 w/ball NT   LAQs 2 x 10 x 2# 2 x 10 x 2# 3 x 10 x 1.5# 2 x 10 x 1.5# 2 x 10 x 1.5# 3 x 10 x 1# 3 x 10 x 1# 1 x 25 x 1# 2 x 10 2 x 10 2 x 10   Seated Hip Flex 2 x 10 x 2# 2 x 10 x 2# 3 x 10 x 1.5# 2 x 10 x 1.5# 2 x 10 x 1.5# 3 x 10 x 1# 3 x 10 x 1# 1 x 25 x 1# 2 x 10 2 x 10 2 x 10   HS curls 3 x 10 x GTB 2 x 10 GTB 2 x 10 GTB  2 x 10 GTB 2 x 10 GTB   3 x 10 x RTB 2 x 10 RTB 2 x 10 RTB 2 x 10   Hip ER  2 x 10 x 2# 2 x 10 x 2# 1 x 15            Hip IR 2 x 10 x 2#  2 x 10 x 2# 1 x 15                                       STANDING:              Tandem Walking in // 4 x 8 ft 4 x 8 ft  -- NT 4 x 8 ft Not today NT 4 x 8 ft 4 x 8 ft 4 x 8 ft 3L   Tandem Stance  -- NT -- NT Not today Not today NT 2 x 10" 2 x 30" 2 x 30" 2 x 30"   SL Balance  -- NT -- NT Not today Not today NT 3 x 10" 3 x 10" 3 x 10" 3 x 10"   Hip ABD -- 2 x 10  -- NT 2 x 10 2 x 10 1 x 15 1 x 15 1 x 10     Hip Flex -- 2 x 10 -- NT 2 x 10 1 x 15 1 x 15 1 x 15 1 x 10     Hip Ext -- 2 x 10  -- NT 2 x 10 2 x 10 1 x 15 1 x 15 1 x 10     Mini Squats in // 2 x 10  2 x 10  -- NT 2 x 10 1 x 10 1 x 10 1 x 15 1 x 10     Heel Raises  3 x 10  2 x 10  -- NT 2 x 10 2 x 10 2 x 10     "   Side stepping in // 4 x 8 ft   -- NT 6 x 8 ft                        Initials BJ BJ BJ BJ GWA 2/6 GWA 1/6 BJ BJ BJ GWA 1/6 BJ     Annika received a hot pack x 10 minutes to her low back throughout treatment session and at end of treatment session     Home Exercises Provided and Patient Education Provided     Education provided:   - use heating pad at home at onset of pain no more than 30 minutes at a time    Written Home Exercises Provided: yes.  Exercises were reviewed and Annika was able to demonstrate them prior to the end of the session.  Annika demonstrated good  understanding of the education provided.      See EMR under Patient Instructions for exercises provided 7/22/2019.    Assessment     Patient requested to have heat on her back today throughout treatment session due to severe increase in pain. Patient also received heat at the end of treatment session due to increased pain with standing therex. Patient tolerated todays progressions with minimal difficulty despite pain.     Annika is progressing well towards her goals.   Pt prognosis is Fair.     Pt will continue to benefit from skilled outpatient physical therapy to address the deficits listed in the problem list box on initial evaluation, provide pt/family education and to maximize pt's level of independence in the home and community environment.     Pt's spiritual, cultural and educational needs considered and pt agreeable to plan of care and goals.     Anticipated barriers to physical therapy: none    Goals:   Short Term Goals: 4 weeks   Patient will be able to resume supine position for 30 minutes, when in bed in order to get a full night of rest. IN PROGRESS  Patient will be able to stand for 30 mins with min-no pain/difficulty in order to cook and clean dishes. IN PROGRESS  Increase (B) Hamstring flexibility a 10 degrees in order to reduce stress of low back. IN PROGRESS    Long Term Goals: 8 weeks   Patient will be able to ambulated for desired  distance with min-no pain/difficulty using rolling walker. IN PROGRESS'  Patient will be able to resume supine position for 1 hour, when in bed in order to get a full night of rest. IN PROGRESS  Patient will be able to sit for 1 hour with min-no difficulty in order to watch movie with her family. IN PROGRESS  Patient will be able to stand for 1 hour with min-no pain/difficulty in order to cook and clean dishes. IN PROGRESS    Plan     Continue with current POC. Resume standing therex next visit.     Muriel Cotton, PT, DPT

## 2019-08-27 ENCOUNTER — TELEPHONE (OUTPATIENT)
Dept: ENDOCRINOLOGY | Facility: CLINIC | Age: 69
End: 2019-08-27

## 2019-08-27 ENCOUNTER — OFFICE VISIT (OUTPATIENT)
Dept: CARDIOLOGY | Facility: CLINIC | Age: 69
End: 2019-08-27
Payer: MEDICARE

## 2019-08-27 VITALS
HEART RATE: 76 BPM | SYSTOLIC BLOOD PRESSURE: 132 MMHG | HEIGHT: 65 IN | BODY MASS INDEX: 48.82 KG/M2 | WEIGHT: 293 LBS | DIASTOLIC BLOOD PRESSURE: 61 MMHG

## 2019-08-27 DIAGNOSIS — I10 ESSENTIAL HYPERTENSION: Primary | ICD-10-CM

## 2019-08-27 DIAGNOSIS — N18.30 CKD (CHRONIC KIDNEY DISEASE) STAGE 3, GFR 30-59 ML/MIN: ICD-10-CM

## 2019-08-27 DIAGNOSIS — E66.01 MORBID OBESITY WITH BMI OF 45.0-49.9, ADULT: ICD-10-CM

## 2019-08-27 DIAGNOSIS — E78.5 HYPERLIPIDEMIA, UNSPECIFIED HYPERLIPIDEMIA TYPE: ICD-10-CM

## 2019-08-27 PROCEDURE — 99214 PR OFFICE/OUTPT VISIT, EST, LEVL IV, 30-39 MIN: ICD-10-PCS | Mod: S$PBB,GC,, | Performed by: STUDENT IN AN ORGANIZED HEALTH CARE EDUCATION/TRAINING PROGRAM

## 2019-08-27 PROCEDURE — 99214 OFFICE O/P EST MOD 30 MIN: CPT | Mod: S$PBB,GC,, | Performed by: STUDENT IN AN ORGANIZED HEALTH CARE EDUCATION/TRAINING PROGRAM

## 2019-08-27 PROCEDURE — 99215 OFFICE O/P EST HI 40 MIN: CPT | Mod: PBBFAC | Performed by: STUDENT IN AN ORGANIZED HEALTH CARE EDUCATION/TRAINING PROGRAM

## 2019-08-27 PROCEDURE — 99999 PR PBB SHADOW E&M-EST. PATIENT-LVL V: ICD-10-PCS | Mod: PBBFAC,GC,, | Performed by: STUDENT IN AN ORGANIZED HEALTH CARE EDUCATION/TRAINING PROGRAM

## 2019-08-27 PROCEDURE — 99999 PR PBB SHADOW E&M-EST. PATIENT-LVL V: CPT | Mod: PBBFAC,GC,, | Performed by: STUDENT IN AN ORGANIZED HEALTH CARE EDUCATION/TRAINING PROGRAM

## 2019-08-27 RX ORDER — CHLORTHALIDONE 25 MG/1
25 TABLET ORAL DAILY
Qty: 30 TABLET | Refills: 11 | Status: SHIPPED | OUTPATIENT
Start: 2019-08-27 | End: 2019-08-27

## 2019-08-27 RX ORDER — CHLORTHALIDONE 25 MG/1
25 TABLET ORAL DAILY
Qty: 30 TABLET | Refills: 11 | Status: SHIPPED | OUTPATIENT
Start: 2019-08-27 | End: 2022-01-14 | Stop reason: SDUPTHER

## 2019-08-27 NOTE — PROGRESS NOTES
Cardiology Clinic Note  Reason for Visit: Hypertension    HPI:   Pt is a 68 year old lady who is here for a follow up for hypertension.     She has a hx of hypertension, diabetes, morbid obesity, stroke, obstructive sleep apnea, hyperlipidemia who presents today for a follow up for her hypertension. She has been feeling well without many complaints. On Saturday or  she noticed some pain under her left breast it is sharp in nature, non-radiating, last a few seconds and then goes away. It is mainly associated with breathing. She denies any exertional chest pain, shortness of breath, palpitations, weight gain, orthopnea, pnd. Has had some leg swelling which is better when she wakes up.     Review of Systems   All other systems reviewed and are negative.      PMH:     Past Medical History:   Diagnosis Date    Allergy     Anemia 2012    Arthritis     CHF (congestive heart failure)     CKD (chronic kidney disease) stage 3, GFR 30-59 ml/min 2012    Clotting disorder     Colon polyp     Deep vein thrombophlebitis of left leg 2012    Degenerative disc disease     Diabetic peripheral neuropathy associated with type 2 diabetes mellitus 2012    GERD (gastroesophageal reflux disease)     HTN (hypertension) 2012    Hyperlipidemia 2012    Lead-induced gout of ankle or foot     right going on three weeks    Nonproliferative diabetic retinopathy of right eye 2012    Obstructive sleep apnea 2012    Osteoporosis     Proliferative diabetic retinopathy of left eye 2012    Stroke 2003    Type 2 diabetes mellitus with diabetic polyneuropathy 2012    Ulcer      Past Surgical History:   Procedure Laterality Date    CATARACT EXTRACTION W/  INTRAOCULAR LENS IMPLANT Left 3/2002    left     CATARACT EXTRACTION W/  INTRAOCULAR LENS IMPLANT Right     OD dr. olivares     SECTION      COLONOSCOPY N/A 2018    Performed by Faizan PAREKH  "MD Bubba [facility] ENDO (2ND FLR)    CYST REMOVAL  2/11/2011    left side of face    ESOPHAGOGASTRODUODENOSCOPY (EGD) N/A 2/26/2018    Performed by Faizan Mac MD at [facility] ENDO (2ND FLR)    EYE SURGERY Bilateral 2012    eye implants    GANGLION CYST EXCISION  11/13/2007    Right wrist    INSERTION, IOL PROSTHESIS Right 12/19/2012    Performed by Linda Glover MD at [facility] OR 1ST FLR    Pan Retinal Photocoagulation Bilateral     Dr. Monterroso (Proliferative Diabetic Retinopathy)    PHACOEMULSIFICATION, CATARACT Right 12/19/2012    Performed by Linda Glover [facility] at [facility] OR 1ST FLR    TOTAL ABDOMINAL HYSTERECTOMY  1982    TOTAL KNEE ARTHROPLASTY  2/2006    left    TRIGGER FINGER RELEASE  9/18/2009     Allergies:     Review of patient's allergies indicates:   Allergen Reactions    Iodinated contrast- oral and iv dye Rash     Medications:     Current Outpatient Medications on File Prior to Visit   Medication Sig Dispense Refill    allopurinol (ZYLOPRIM) 300 MG tablet Take 300 mg by mouth once daily.       amLODIPine (NORVASC) 10 MG tablet Take 1 tablet (10 mg total) by mouth once daily. 90 tablet 3    aspirin 81 MG Chew Take 1 tablet (81 mg total) by mouth once daily.  0    BD ULTRA-FINE KIKA PEN NEEDLE 32 gauge x 5/32" Ndle To use 4 times daily 200 each 20    blood sugar diagnostic Strp 1 each by Misc.(Non-Drug; Combo Route) route 4 (four) times daily. Dx code  E11.42 . Pt use one touch verio. 200 each 12    blood-glucose meter kit Use as instructed, true test/result meter 1 each 0    blood-glucose meter kit 1 each by Other route once daily. Use daily. Insurance proffered one touch  E11.42 1 each 0    colchicine (COLCRYS) 0.6 mg tablet Take 0.6 mg by mouth as needed.      DULoxetine (CYMBALTA) 30 MG capsule Take 1 capsule (30 mg total) by mouth once daily. 90 capsule 3    famotidine (PEPCID) 20 MG tablet Take 1 tablet (20 mg total) by mouth 2 (two) times daily. 1 tablet 0    fexofenadine " (ALLEGRA) 180 MG tablet TAKE 1 TABLET BY MOUTH ONCE DAILY 30 tablet 0    finasteride (PROSCAR) 5 mg tablet TAKE 1 TABLET(5 MG) BY MOUTH EVERY DAY 90 tablet 1    fluocinonide (LIDEX) 0.05 % external solution AAA scalp qday - bid prn pruritus 60 mL 3    glucagon, human recombinant, (GLUCAGON EMERGENCY KIT, HUMAN,) 1 mg SolR Inject 1 mg into the muscle as needed. 1 each 3    insulin NPH-insulin regular, 70/30, (NOVOLIN 70/30) 100 unit/mL (70-30) injection Breakfast: 100 -110 units Lunch: 80 - 90 units Dinner: 65 - 70 units MAX TDD: 270 3 vial 11    insulin regular 100 unit/mL Inj injection Inject 120 Units into the skin 3 (three) times daily before meals. 120 units with breakfast, 110 units with lunch, and 90 units with dinner;       irbesartan (AVAPRO) 300 MG tablet Take 1 tablet (300 mg total) by mouth every evening. 90 tablet 3    ketoconazole (NIZORAL) 2 % shampoo Wash hair with medicated shampoo at least 2x/week - let sit on scalp at least 5 minutes prior to rinsing 120 mL 5    labetalol (NORMODYNE) 300 MG tablet Take 1 tablet (300 mg total) by mouth 2 (two) times daily. 180 tablet 3    lancets 31 gauge Misc 1 lancet by Misc.(Non-Drug; Combo Route) route 4 (four) times daily. E11.42 . Prescribed one touch 200 each 6    lancets 33 gauge Misc 1 lancet by Misc.(Non-Drug; Combo Route) route 4 (four) times daily. Dx code E11.42 200 each 12    multivitamin (THERAGRAN) per tablet Take 1 tablet by mouth once daily.      pregabalin (LYRICA) 150 MG capsule Take 1 capsule (150 mg total) by mouth 2 (two) times daily. 60 capsule 5    simvastatin (ZOCOR) 40 MG tablet Take 0.5 tablets (20 mg total) by mouth every evening. 90 tablet 4    traMADol (ULTRAM) 50 mg tablet Take 1 tablet (50 mg total) by mouth every 8 (eight) hours as needed. 90 tablet 3    triamcinolone acetonide 0.1% (KENALOG) 0.1 % ointment AAA bid hands 60 g 1    TRUETEST TEST STRIPS Strp 1 strip by Misc.(Non-Drug; Combo Route) route 4 (four)  "times daily. Dx code E11.42. Prescribed cover by insurance. 400 strip 4    [DISCONTINUED] furosemide (LASIX) 20 MG tablet Take 1 tablet (20 mg total) by mouth once daily. Take an extra pill if short of breath or leg swelling 120 tablet 3    [DISCONTINUED] insulin glargine, TOUJEO, (TOUJEO SOLOSTAR) 300 unit/mL (1.5 mL) InPn pen INJECT 40 UNITS INTO THE SKIN EVERY EVENING TITRATE UP AS DIRECTED. 4.5 mL 0    [DISCONTINUED] liraglutide 0.6 mg/0.1 mL, 18 mg/3 mL, subq PNIJ (VICTOZA 3-MILAN) 0.6 mg/0.1 mL (18 mg/3 mL) PnIj Inject 1.2 mg into the skin once daily. 3 mL 6     No current facility-administered medications on file prior to visit.      Social History:     Social History     Tobacco Use    Smoking status: Never Smoker    Smokeless tobacco: Never Used   Substance Use Topics    Alcohol use: No     Family History:     Family History   Problem Relation Age of Onset    Diabetes Mother     Hypertension Mother     Heart disease Father     Diabetes Sister     Thyroid disease Brother     Diabetes Brother     Hypertension Brother     No Known Problems Daughter     No Known Problems Son     No Known Problems Daughter     Amblyopia Neg Hx     Blindness Neg Hx     Glaucoma Neg Hx     Macular degeneration Neg Hx     Retinal detachment Neg Hx     Strabismus Neg Hx     Colon cancer Neg Hx     Esophageal cancer Neg Hx        Physical Exam  /61 (BP Location: Right arm, Patient Position: Sitting, BP Method: X-Large (Automatic))   Pulse 76   Ht 5' 5" (1.651 m)   Wt (!) 144.8 kg (319 lb 3.6 oz)   BMI 53.12 kg/m²    GEN: Alert and oriented in NAD  NECK: no JVD appreciated   CVS: RRR, s1/s2, 2/6 systolic murmur noted at the right upper sternal border  PULM: CTAB no rales  ABD: NT/ND BS +  Extremities: warm and dry, palpable pulses, mild edema  NEURO: Alert and oriented x 3  PSYCH: appropriate affect.             Labs:     Lab Results   Component Value Date     08/05/2019    K 5.0 08/05/2019    CL " 107 08/05/2019    CO2 25 08/05/2019    BUN 26 (H) 08/05/2019    CREATININE 1.7 (H) 08/05/2019    ANIONGAP 8 08/05/2019     Lab Results   Component Value Date    HGBA1C 8.8 (H) 08/05/2019     No results found for: BNP, BNPTRIAGEBLO Lab Results   Component Value Date    WBC 5.73 05/26/2019    HGB 9.6 (L) 05/26/2019    HCT 29.1 (L) 05/26/2019     05/26/2019    GRAN 2.8 05/26/2019    GRAN 48.8 05/26/2019     Lab Results   Component Value Date    CHOL 153 03/26/2019    HDL 40 03/26/2019    LDLCALC 83.4 03/26/2019    TRIG 148 03/26/2019          No results found for: EF    Assessment and Plan  Annika Mac is a 68 y.o. lady who presents for hypertension    1. Essential hypertension   BP in 130s today in clinic but really fluctuates at home. Will continue on ARB, Amlodipine, Labetalol and will start on Chlorthalidone.  Renal function panel   2. CKD (chronic kidney disease) stage 3, GFR 30-59 ml/min   Needs better control of diabetes and blood pressure Renal function panel   3. Morbid obesity with BMI of 45.0-49.9, adult   Counseled on diet and weight loss    4. Hyperlipidemia, unspecified hyperlipidemia type   Continue statin      Plan   Switch to chlorthalidone  Check BP at home    Signed:        Angel Vásquez MD  Cardiology Fellow  Pager [phone]

## 2019-08-27 NOTE — TELEPHONE ENCOUNTER
Called pt and advise that we did received BG logs and there is no changes for right now. BG no looks good.     Pt verbalized understand.   BG log scanned in media.

## 2019-08-28 ENCOUNTER — CLINICAL SUPPORT (OUTPATIENT)
Dept: REHABILITATION | Facility: HOSPITAL | Age: 69
End: 2019-08-28
Payer: MEDICARE

## 2019-08-28 DIAGNOSIS — R29.3 POSTURE IMBALANCE: ICD-10-CM

## 2019-08-28 DIAGNOSIS — Z74.09 DECREASED FUNCTIONAL MOBILITY AND ENDURANCE: ICD-10-CM

## 2019-08-28 DIAGNOSIS — R29.898 WEAKNESS OF BOTH LOWER EXTREMITIES: ICD-10-CM

## 2019-08-28 DIAGNOSIS — R26.9 GAIT ABNORMALITY: ICD-10-CM

## 2019-08-28 PROCEDURE — 97110 THERAPEUTIC EXERCISES: CPT | Mod: PO

## 2019-08-28 NOTE — PROGRESS NOTES
Physical Therapy Daily Treatment Note     Name: Annika Mac  [facility] Number: [id number]    Therapy Diagnosis:        Encounter Diagnoses   Name Primary?    Weakness of both lower extremities      Decreased functional mobility and endurance      Gait abnormality      Posture imbalance      Physician: Irena Berger PA-C    Visit Date: 8/28/2019    Physician Orders: PT Eval and Treat: Lumbar protocol with home exercises. ROM, stretching lumbar and abdominal musculature. Aerobic condidtioning, aqua therapy , with modalities including ultrasound, massage, dry needling PRN. 3 X week for 6 weeks.  Medical Diagnosis from Referral: Spondylolisthesis, unspecified spinal region; Lumbar radiculopathy  Evaluation Date: 7/15/2019  Authorization Period Expiration: 07/02/2020  Plan of Care Expiration: 09/15/19  Visit # / Visits authorized: 13/50    Time In: 10:00 am   Time Out: 10:40 am   Total Billable Time: 40 minutes    Precautions: Standard    Subjective     Pt reports: she is feeling tired today and did not get much sleep last night because of her back pain and low sugar levels.   She was compliant with home exercise program.  Response to previous treatment: mild soreness   Functional change: n/a     Pain: 8/10  Location: bilateral back      Objective     Annika received therapeutic exercises to develop strength, endurance, ROM, flexibility and core stabilization for 40 minutes including:      Date  08/28/19 08/26/19 08/19/19 8/14/19 08/12/10 08/07/19 08/05/19 07/31/19 07/29/19 07/24/19 07/22/19 07/17/19   VISIT 13/50 12/50 11/50 10/50 9/50 8/50 7/50 6/50 5/50 4/50 3/50 2/10   POC EXP. DATE 09/15/19 09/15/19 09/15/19 09/15/19 09/15/19 09/15/19 09/15/19 09/15/19 09/15/19 09/15/19 09/15/19       09/15/19   VISIT AMOUNT  MEDICARE TOTAL 90.96  1204.81 90.96  1113.85 90.96  1022.89   90.96  931.84 60.64  749.92 90.96  689.28 60.64  598.32 90.96   537.68 90.96  446.72 90.96  355.76 90.96  264.80 60.64  173.84   FACE-TO-FACE  "09/15/19 09/15/19 09/15/19 08/15/19 08/15/19 08/15/19 08/15/19 08/15/19 08/15/19 08/15/19 08/15/19 08/15/19                  SEATED:               HSS w/ strap - sitting 10 x 10" 10 x 10"  10 x 10" 10 x 10" 10 x 10" 10 x 10" 10 x 10" sitting 10 x 10" sitting  10 x 10" sitting  10 x 10" sitting 10 x 10" sitting 10 x 10"   TA's - sitting 30 x 5" 30 x 5" 20 x 5" 20 x 5" 20 x 5" 20 x 5" 20 x 5" 20 x 5" 20 x 5" 20 x 3" 20 x 3" sitting 20 x 3"   LTR - sitting 3 x 10  3 x 10  3 x 10  3 x 10  3 x 10  3 x 10 3 x 10 sitting 3 x 10 sitting  3 x 10 sitting  2 x 10 sitting  2 x 10 sitting 2 x 10   Hip abduction - sitting 2 x 10 BTB 2 x 10 BTB 3 x 10 GTB 3 x 10 GTB 2 x 10 GTB 2 x 10 GTB 3 x 10 RTB 3 x 10 RTB 3 x 10 RTB 2 x 10 RTB 1 x 15 RTB NT   Hip adduction - sitting 30 x 5" 30 x 5" 25 x 5" w/ball  30 x 5" w/ball 30 x 5" w/ball 30 x 5" w/ ball 3 x 10 x 5" 3 x 10 x 5" 3 x 10 x 5" 2 x 10 x 3" 1 x 15 w/ball NT   LAQs 2 x 10 x 2# 2 x 10 x 2# 2 x 10 x 2# 3 x 10 x 1.5# 2 x 10 x 1.5# 2 x 10 x 1.5# 3 x 10 x 1# 3 x 10 x 1# 1 x 25 x 1# 2 x 10 2 x 10 2 x 10   Seated Hip Flex 2 x 10 x 2# 2 x 10 x 2# 2 x 10 x 2# 3 x 10 x 1.5# 2 x 10 x 1.5# 2 x 10 x 1.5# 3 x 10 x 1# 3 x 10 x 1# 1 x 25 x 1# 2 x 10 2 x 10 2 x 10   HS curls 3 x 10 x GTB 3 x 10 x GTB 2 x 10 GTB 2 x 10 GTB  2 x 10 GTB 2 x 10 GTB   3 x 10 x RTB 2 x 10 RTB 2 x 10 RTB 2 x 10   Hip ER  2 x 10 x 2# 2 x 10 x 2# 2 x 10 x 2# 1 x 15            Hip IR 2 x 10 x 2# 2 x 10 x 2#  2 x 10 x 2# 1 x 15           Sit to stand                               STANDING:               Tandem Walking in // 5 x 8 ft 4 x 8 ft 4 x 8 ft  -- NT 4 x 8 ft Not today NT 4 x 8 ft 4 x 8 ft 4 x 8 ft 3L   Tandem Stance  -- -- NT -- NT Not today Not today NT 2 x 10" 2 x 30" 2 x 30" 2 x 30"   SL Balance  -- -- NT -- NT Not today Not today NT 3 x 10" 3 x 10" 3 x 10" 3 x 10"   Hip ABD 2 x 10  -- 2 x 10  -- NT 2 x 10 2 x 10 1 x 15 1 x 15 1 x 10     Hip Flex 2 x 10 -- 2 x 10 -- NT 2 x 10 1 x 15 1 x 15 1 x 15 1 x 10     Hip " Ext 2 x 10  -- 2 x 10  -- NT 2 x 10 2 x 10 1 x 15 1 x 15 1 x 10     Mini Squats in // 1 x 10 2 x 10  2 x 10  -- NT 2 x 10 1 x 10 1 x 10 1 x 15 1 x 10     Heel Raises  2 x 10  3 x 10  2 x 10  -- NT 2 x 10 2 x 10 2 x 10       Side stepping in // 4 x 8 ft  4 x 8 ft   -- NT 6 x 8 ft                         Initials BJ BJ BJ BJ BJ GWA 2/6 GWA 1/6 BJ BJ BJ GWA 1/6 BJ     Annika received a hot pack x 10 minutes to her low back throughout treatment session.    Home Exercises Provided and Patient Education Provided     Education provided:   - performing standing HEP at home as tolerated     Written Home Exercises Provided: yes.  Exercises were reviewed and Annika was able to demonstrate them prior to the end of the session.  Annika demonstrated good  understanding of the education provided.      See EMR under Patient Instructions for exercises provided 7/22/2019.    Assessment     Patient requested to have heat on her back today throughout treatment session due to severe increase in pain. Patient completed the above exercise with verbal and tactile cueing to perform with proper form and correct technique.Will continue to progress patient as tolerated.     Annika is progressing well towards her goals.   Pt prognosis is Fair.     Pt will continue to benefit from skilled outpatient physical therapy to address the deficits listed in the problem list box on initial evaluation, provide pt/family education and to maximize pt's level of independence in the home and community environment.     Pt's spiritual, cultural and educational needs considered and pt agreeable to plan of care and goals.     Anticipated barriers to physical therapy: none    Goals:   Short Term Goals: 4 weeks   Patient will be able to resume supine position for 30 minutes, when in bed in order to get a full night of rest. IN PROGRESS  Patient will be able to stand for 30 mins with min-no pain/difficulty in order to cook and clean dishes. IN PROGRESS  Increase (B)  Hamstring flexibility a 10 degrees in order to reduce stress of low back. IN PROGRESS    Long Term Goals: 8 weeks   Patient will be able to ambulated for desired distance with min-no pain/difficulty using rolling walker. IN PROGRESS'  Patient will be able to resume supine position for 1 hour, when in bed in order to get a full night of rest. IN PROGRESS  Patient will be able to sit for 1 hour with min-no difficulty in order to watch movie with her family. IN PROGRESS  Patient will be able to stand for 1 hour with min-no pain/difficulty in order to cook and clean dishes. IN PROGRESS    Plan     Start with standing therex as tolerated next visit.     Muriel Cotton, PT, DPT

## 2019-09-02 NOTE — PROGRESS NOTES
Subjective:       Patient ID:    Interval Hx:   egd, colonoscopy 2/2018  egd erythematous mucosa   colonsocopy one polyp removed  She claims she is defecating with most urine voids since colonoscopy  serumcrt stable at 1.8   k has been 50-5.5 on irbesartan 150 mg nightly and spironolactone, she trys to stay on a low k diet  Bp at home  running 120/60- 130/70    she stopped protnix and is using pepcid  Last labs 12/2017     Annika Mac is a 67 y.o. Black or  female who presents for new evaluation of CKD.  Ms. Mac is a very pleasant female with a long history of DM and HTN. She is having difficulty managing diabetes at present and is scheduled for continuous monitoring. Most recent creatinine is 1.8. Her creatinine did spike at 2.5 in January 2016. After discussion, she did recall a severe gout attack and use of colchicine at the time.       HPI   Her blood sugars have been elevated as high as 273 on labs. no fever or chills, taking spironolactone and avapro and lasix, but she is not on a low K diet.  Urinating well, but burning. Recent UA negative and UPRCT no proteinuria.No recent gout.  Hemoglobin A1c 9.4, serum creatinine now 1.9.  Severe morbid obesity in a wheelchair  K5.5    Review of Systems   Constitutional: Negative for activity change, appetite change, chills, diaphoresis, fatigue, fever and unexpected weight change.   HENT: Negative for congestion, facial swelling and trouble swallowing.    Eyes: Negative for pain, discharge, redness and visual disturbance.   Respiratory: Negative for cough, shortness of breath and wheezing.    Cardiovascular: Negative for chest pain, palpitations and leg swelling.   Gastrointestinal: Negative for abdominal distention, abdominal pain, constipation and diarrhea.   Endocrine: Negative for cold intolerance and heat intolerance.   Genitourinary: Negative for decreased urine volume, difficulty urinating, dysuria, flank pain, frequency and urgency.  "  Musculoskeletal: Positive for arthralgias. Negative for joint swelling and myalgias.   Skin: Negative for color change.   Allergic/Immunologic: Negative for immunocompromised state.   Neurological: Negative for dizziness, tremors, syncope, speech difficulty, weakness, light-headedness and numbness.   Hematological: Negative for adenopathy.   Psychiatric/Behavioral: Negative for agitation, confusion, decreased concentration and dysphoric mood.       Objective:     Blood pressure (!) 120/58, pulse 72, height 5' 5" (1.651 m), weight (!) 139.9 kg (308 lb 6.8 oz), SpO2 98 %.    Physical Exam   Constitutional: She is oriented to person, place, and time. She appears well-developed and well-nourished.   Morbid obesity precludes much of exam, no apparent distress, sitting in a wheelchair   HENT:   Head: Normocephalic and atraumatic.   Eyes: Conjunctivae and EOM are normal. Pupils are equal, round, and reactive to light.   Neck: Neck supple.   Cardiovascular: Normal rate, regular rhythm, normal heart sounds and intact distal pulses.    +1 bilateral LE edema   Pulmonary/Chest: Effort normal and breath sounds normal.   Abdominal: Soft. Bowel sounds are normal.   Musculoskeletal: Normal range of motion.   Neurological: She is alert and oriented to person, place, and time. She has normal reflexes.   Skin: Skin is warm and dry.   Psychiatric: She has a normal mood and affect. Her behavior is normal.   Nursing note and vitals reviewed.      Assessment:       1. 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 Essential hypertension    3. Dyslipidemia    4. Anemia of chronic renal failure, stage 4 (severe)    5. Vitamin D insufficiency    6. Morbid obesity due to excess calories    7. Secondary hyperparathyroidism        Plan:       CKD stage4   likely secondary to longstanding poorly controlled DM and HTn, with past GEORGE 2016 possibly related to gout attacka nd colchicine use. Currnetly serum creatinine stable at 1.6-1.8.   Will " elbow pain/injury order serologies and hep studies and renal US.  1. CKD 4  Estimated GFR 25-30 cc/m.  Slowly progressive  Gout: Follow follow   uric acid and continue allopurinol last 8.2   repeat labs today    Lab Results   Component Value Date    CREATININE 1.8 (H) 12/20/2017     Protein Creatinine Ratios: No proteinuria.follow serially     Prot/Creat Ratio, Ur   Date Value Ref Range Status   05/16/2017 Unable to calculate 0.00 - 0.20 Final   10/10/2016 Unable to calculate 0.00 - 0.20 Final   05/09/2016 0.03 0.00 - 0.20 Final     ·   ·   Hyperkalmeia: borderline serum potassiums  Counseled on low K diet. reduce Aldactone 12.5  irbesartan reduced to 150 mg.  If still no improvement will need to stop the Aldactone.    Repeat RFP today   Lab Results   Component Value Date     12/20/2017    K 5.0 12/20/2017    CO2 25 12/20/2017     2. HTN: Controlled on current regimen. With reduction in spironolactone and irbesartan begin amlodipine 5 mg daily     3. Renal osteodystrophy: stable PTH level check  Vitamin D.   Lab Results   Component Value Date    .0 (H) 10/26/2017    CALCIUM 10.1 12/20/2017    CAION 1.27 02/28/2012    PHOS 3.3 12/20/2017       4. Anemia: Stable. Asymptomatic. iron sulfate 325 mg twice a day GI evaluation colonoscope and EGD suggested GI referral placed, check irons again in 3 months may need iron infusion but with conssitenlt low hbg and normal gi eval wll first order labs t look for thalessemia  Repeat cbc and irons today  Lab Results   Component Value Date    HGB 9.9 (L) 11/16/2017        5. DM:  Poorly controlled. Had nutritional counseling today.f/u pcp    Lab Results   Component Value Date    HGBA1C 10.4 (H) 01/12/2018       6. Lipid management: Controlled. Continue Statin.   Lab Results   Component Value Date    LDLCALC 77.2 06/30/2017         Follow up in 4 months.  Labs today and in 3 mo

## 2019-09-03 ENCOUNTER — LAB VISIT (OUTPATIENT)
Dept: LAB | Facility: HOSPITAL | Age: 69
End: 2019-09-03
Attending: STUDENT IN AN ORGANIZED HEALTH CARE EDUCATION/TRAINING PROGRAM
Payer: MEDICARE

## 2019-09-03 DIAGNOSIS — N18.30 CKD (CHRONIC KIDNEY DISEASE) STAGE 3, GFR 30-59 ML/MIN: ICD-10-CM

## 2019-09-03 DIAGNOSIS — I10 ESSENTIAL HYPERTENSION: ICD-10-CM

## 2019-09-03 PROCEDURE — 80069 RENAL FUNCTION PANEL: CPT

## 2019-09-03 PROCEDURE — 36415 COLL VENOUS BLD VENIPUNCTURE: CPT | Mod: PO

## 2019-09-04 ENCOUNTER — CLINICAL SUPPORT (OUTPATIENT)
Dept: REHABILITATION | Facility: HOSPITAL | Age: 69
End: 2019-09-04
Payer: MEDICARE

## 2019-09-04 ENCOUNTER — OFFICE VISIT (OUTPATIENT)
Dept: INTERNAL MEDICINE | Facility: CLINIC | Age: 69
End: 2019-09-04
Payer: MEDICARE

## 2019-09-04 VITALS
HEIGHT: 65 IN | WEIGHT: 293 LBS | HEART RATE: 79 BPM | BODY MASS INDEX: 48.82 KG/M2 | DIASTOLIC BLOOD PRESSURE: 62 MMHG | SYSTOLIC BLOOD PRESSURE: 124 MMHG

## 2019-09-04 DIAGNOSIS — G89.29 CHRONIC LOW BACK PAIN WITH SCIATICA, SCIATICA LATERALITY UNSPECIFIED, UNSPECIFIED BACK PAIN LATERALITY: ICD-10-CM

## 2019-09-04 DIAGNOSIS — Z79.4 TYPE 2 DIABETES MELLITUS WITH COMPLICATION, WITH LONG-TERM CURRENT USE OF INSULIN: ICD-10-CM

## 2019-09-04 DIAGNOSIS — R26.9 GAIT ABNORMALITY: ICD-10-CM

## 2019-09-04 DIAGNOSIS — R29.3 POSTURE IMBALANCE: ICD-10-CM

## 2019-09-04 DIAGNOSIS — N18.30 STAGE 3 CHRONIC KIDNEY DISEASE: Primary | ICD-10-CM

## 2019-09-04 DIAGNOSIS — M54.40 CHRONIC LOW BACK PAIN WITH SCIATICA, SCIATICA LATERALITY UNSPECIFIED, UNSPECIFIED BACK PAIN LATERALITY: ICD-10-CM

## 2019-09-04 DIAGNOSIS — M47.816 LUMBAR FACET ARTHROPATHY: ICD-10-CM

## 2019-09-04 DIAGNOSIS — Z12.31 ENCOUNTER FOR SCREENING MAMMOGRAM FOR BREAST CANCER: ICD-10-CM

## 2019-09-04 DIAGNOSIS — E11.8 TYPE 2 DIABETES MELLITUS WITH COMPLICATION, WITH LONG-TERM CURRENT USE OF INSULIN: ICD-10-CM

## 2019-09-04 DIAGNOSIS — M17.11 PRIMARY OSTEOARTHRITIS OF RIGHT KNEE: ICD-10-CM

## 2019-09-04 DIAGNOSIS — M48.061 SPINAL STENOSIS OF LUMBAR REGION, UNSPECIFIED WHETHER NEUROGENIC CLAUDICATION PRESENT: ICD-10-CM

## 2019-09-04 DIAGNOSIS — R29.898 WEAKNESS OF BOTH LOWER EXTREMITIES: ICD-10-CM

## 2019-09-04 DIAGNOSIS — E11.43 DIABETIC AUTONOMIC NEUROPATHY ASSOCIATED WITH TYPE 2 DIABETES MELLITUS: ICD-10-CM

## 2019-09-04 DIAGNOSIS — E78.2 MIXED HYPERLIPIDEMIA: ICD-10-CM

## 2019-09-04 DIAGNOSIS — G63 POLYNEUROPATHY ASSOCIATED WITH UNDERLYING DISEASE: ICD-10-CM

## 2019-09-04 DIAGNOSIS — Z74.09 DECREASED FUNCTIONAL MOBILITY AND ENDURANCE: ICD-10-CM

## 2019-09-04 LAB
ALBUMIN SERPL BCP-MCNC: 3.7 G/DL (ref 3.5–5.2)
ANION GAP SERPL CALC-SCNC: 11 MMOL/L (ref 8–16)
BUN SERPL-MCNC: 29 MG/DL (ref 8–23)
CALCIUM SERPL-MCNC: 9.7 MG/DL (ref 8.7–10.5)
CHLORIDE SERPL-SCNC: 105 MMOL/L (ref 95–110)
CO2 SERPL-SCNC: 24 MMOL/L (ref 23–29)
CREAT SERPL-MCNC: 2 MG/DL (ref 0.5–1.4)
EST. GFR  (AFRICAN AMERICAN): 28.7 ML/MIN/1.73 M^2
EST. GFR  (NON AFRICAN AMERICAN): 24.9 ML/MIN/1.73 M^2
GLUCOSE SERPL-MCNC: 237 MG/DL (ref 70–110)
PHOSPHATE SERPL-MCNC: 3.6 MG/DL (ref 2.7–4.5)
POTASSIUM SERPL-SCNC: 4.4 MMOL/L (ref 3.5–5.1)
SODIUM SERPL-SCNC: 140 MMOL/L (ref 136–145)

## 2019-09-04 PROCEDURE — 99214 PR OFFICE/OUTPT VISIT, EST, LEVL IV, 30-39 MIN: ICD-10-PCS | Mod: S$PBB,,, | Performed by: INTERNAL MEDICINE

## 2019-09-04 PROCEDURE — 99999 PR PBB SHADOW E&M-EST. PATIENT-LVL IV: CPT | Mod: PBBFAC,,, | Performed by: INTERNAL MEDICINE

## 2019-09-04 PROCEDURE — 97110 THERAPEUTIC EXERCISES: CPT | Mod: PO

## 2019-09-04 PROCEDURE — 97164 PT RE-EVAL EST PLAN CARE: CPT | Mod: PO

## 2019-09-04 PROCEDURE — 99214 OFFICE O/P EST MOD 30 MIN: CPT | Mod: S$PBB,,, | Performed by: INTERNAL MEDICINE

## 2019-09-04 PROCEDURE — 99214 OFFICE O/P EST MOD 30 MIN: CPT | Mod: PBBFAC | Performed by: INTERNAL MEDICINE

## 2019-09-04 PROCEDURE — 99999 PR PBB SHADOW E&M-EST. PATIENT-LVL IV: ICD-10-PCS | Mod: PBBFAC,,, | Performed by: INTERNAL MEDICINE

## 2019-09-04 RX ORDER — SIMVASTATIN 40 MG/1
40 TABLET, FILM COATED ORAL NIGHTLY
Qty: 90 TABLET | Refills: 4 | Status: SHIPPED | OUTPATIENT
Start: 2019-09-04 | End: 2020-11-04

## 2019-09-04 RX ORDER — DULOXETIN HYDROCHLORIDE 30 MG/1
30 CAPSULE, DELAYED RELEASE ORAL DAILY
Qty: 90 CAPSULE | Refills: 3 | Status: SHIPPED | OUTPATIENT
Start: 2019-09-04 | End: 2020-08-20

## 2019-09-04 NOTE — PROGRESS NOTES
Outpatient Therapy Discharge Summary     Name: Annika Mac  Clinic Number: [id number]    Therapy Diagnosis:        Encounter Diagnoses   Name Primary?    Weakness of both lower extremities      Decreased functional mobility and endurance      Gait abnormality      Posture imbalance      Physician: Irena Berger PA-C    Visit Date: 9/4/2019    Physician Orders: PT Eval and Treat: Lumbar protocol with home exercises. ROM, stretching lumbar and abdominal musculature. Aerobic condidtioning, aqua therapy , with modalities including ultrasound, massage, dry needling PRN. 3 X week for 6 weeks.  Medical Diagnosis from Referral: Spondylolisthesis, unspecified spinal region; Lumbar radiculopathy  Evaluation Date: 7/15/2019  Authorization Period Expiration: 07/02/2020  Plan of Care Expiration: 09/15/19  Visit # / Visits authorized: 14/50    Date of Last visit: 9/4/19  Total Visits Received: 50  Cancelled Visits: 1  No Show Visits: 0    Time In: 10:00 am   Time Out: 10:30 am   Total Billable Time: 30 minutes    Precautions: Standard    Subjective     Pt reports: she feels as if therapy has helped a little bit and she has made 10-15% improvement. Patient states she is aware that her condition is based on the lumbar disc protrusion.   She was compliant with home exercise program.  Response to previous treatment: mild soreness   Functional change: n/a     Pain: 8/10  Location: bilateral back      Objective     Annika received therapeutic exercises to develop strength, endurance, ROM, flexibility and core stabilization for 10 minutes including:      Date  9/4/19 08/28/19 08/26/19 08/19/19 8/14/19 08/12/10 08/07/19 08/05/19 07/31/19 07/29/19 07/24/19 07/22/19 07/17/19   VISIT 14/50 13/50 12/50 11/50 10/50 9/50 8/50 7/50 6/50 5/50 4/50 3/50 2/10   POC EXP. DATE 09/15/19 09/15/19 09/15/19 09/15/19 09/15/19 09/15/19 09/15/19 09/15/19 09/15/19 09/15/19 09/15/19 09/15/19       09/15/19   VISIT AMOUNT  MEDICARE TOTAL   "90.96  1204.81 90.96  1113.85 90.96  1022.89   90.96  931.84 60.64  749.92 90.96  689.28 60.64  598.32 90.96   537.68 90.96  446.72 90.96  355.76 90.96  264.80 60.64  173.84   FACE-TO-FACE 9/15/9 09/15/19 09/15/19 09/15/19 08/15/19 08/15/19 08/15/19 08/15/19 08/15/19 08/15/19 08/15/19 08/15/19 08/15/19                   SEATED:                HSS w/ strap - sitting 10 x 10" 10 x 10" 10 x 10"  10 x 10" 10 x 10" 10 x 10" 10 x 10" 10 x 10" sitting 10 x 10" sitting  10 x 10" sitting  10 x 10" sitting 10 x 10" sitting 10 x 10"   TA's - sitting -- 30 x 5" 30 x 5" 20 x 5" 20 x 5" 20 x 5" 20 x 5" 20 x 5" 20 x 5" 20 x 5" 20 x 3" 20 x 3" sitting 20 x 3"   LTR - sitting 3 x 10  3 x 10  3 x 10  3 x 10  3 x 10  3 x 10  3 x 10 3 x 10 sitting 3 x 10 sitting  3 x 10 sitting  2 x 10 sitting  2 x 10 sitting 2 x 10   Hip abduction - sitting  2 x 10 BTB 2 x 10 BTB 3 x 10 GTB 3 x 10 GTB 2 x 10 GTB 2 x 10 GTB 3 x 10 RTB 3 x 10 RTB 3 x 10 RTB 2 x 10 RTB 1 x 15 RTB NT   Hip adduction - sitting  30 x 5" 30 x 5" 25 x 5" w/ball  30 x 5" w/ball 30 x 5" w/ball 30 x 5" w/ ball 3 x 10 x 5" 3 x 10 x 5" 3 x 10 x 5" 2 x 10 x 3" 1 x 15 w/ball NT   LAQs 2 x 10 x 2#  2 x 10 x 2# 2 x 10 x 2# 2 x 10 x 2# 3 x 10 x 1.5# 2 x 10 x 1.5# 2 x 10 x 1.5# 3 x 10 x 1# 3 x 10 x 1# 1 x 25 x 1# 2 x 10 2 x 10 2 x 10   Seated Hip Flex 2 x 10 x 2# 2 x 10 x 2# 2 x 10 x 2# 2 x 10 x 2# 3 x 10 x 1.5# 2 x 10 x 1.5# 2 x 10 x 1.5# 3 x 10 x 1# 3 x 10 x 1# 1 x 25 x 1# 2 x 10 2 x 10 2 x 10   HS curls  3 x 10 x GTB 3 x 10 x GTB 2 x 10 GTB 2 x 10 GTB  2 x 10 GTB 2 x 10 GTB   3 x 10 x RTB 2 x 10 RTB 2 x 10 RTB 2 x 10   Hip ER  2 x 10 x 2#  2 x 10 x 2# 2 x 10 x 2# 2 x 10 x 2# 1 x 15            Hip IR 2 x 10 x 2# 2 x 10 x 2# 2 x 10 x 2#  2 x 10 x 2# 1 x 15           Sit to stand                                 STANDING:                Tandem Walking in //  5 x 8 ft 4 x 8 ft 4 x 8 ft  -- NT 4 x 8 ft Not today NT 4 x 8 ft 4 x 8 ft 4 x 8 ft 3L   Tandem Stance   -- -- NT -- NT Not today " "Not today NT 2 x 10" 2 x 30" 2 x 30" 2 x 30"   SL Balance   -- -- NT -- NT Not today Not today NT 3 x 10" 3 x 10" 3 x 10" 3 x 10"   Hip ABD  2 x 10  -- 2 x 10  -- NT 2 x 10 2 x 10 1 x 15 1 x 15 1 x 10     Hip Flex  2 x 10 -- 2 x 10 -- NT 2 x 10 1 x 15 1 x 15 1 x 15 1 x 10     Hip Ext  2 x 10  -- 2 x 10  -- NT 2 x 10 2 x 10 1 x 15 1 x 15 1 x 10     Mini Squats in //  1 x 10 2 x 10  2 x 10  -- NT 2 x 10 1 x 10 1 x 10 1 x 15 1 x 10     Heel Raises   2 x 10  3 x 10  2 x 10  -- NT 2 x 10 2 x 10 2 x 10       Side stepping in //  4 x 8 ft  4 x 8 ft   -- NT 6 x 8 ft                          Initials  BJ BJ BJ BJ BJ GWA 2/6 GWA 1/6 BJ BJ BJ GWA 1/6 BJ     Annika received a hot pack x 10 minutes to her low back throughout treatment session. NOT TODAY    Lower Extremity Strength updated at DC 9/4/19  Right LE   Left LE     Knee extension: 4+/5 Knee extension: 5/5   Knee flexion: 5/5 Knee flexion: 5/5   Hip flexion: 3+/5 Hip flexion: 4/5   Hip abduction: 4+/5 Hip abduction: 4+/5   Hip adduction: 4+/5 Hip adduction 4+/5     Discharge Lumbar FOTO: 54% - At least 40 percent but less than 60 percent    Home Exercises Provided and Patient Education Provided     Education provided:   - continue HEP as tolerated     Written Home Exercises Provided: yes.  Exercises were reviewed and Annika was able to demonstrate them prior to the end of the session.  Annika demonstrated good  understanding of the education provided.      See EMR under Patient Instructions for exercises provided 7/22/2019.    Assessment     Annika has completed 14 therapy visits, and as requested to discharge from therapy at this time. Patient has met her maximum rehab potential at this time and would benefit from a breaks from therapy. Patient has demonstrated improvements in overall strength, endurance, and functional mobility. Patient is ambulating with a SPC full time as she is able to decrease use of RW. Patient is able to tolerated standing and walking for longer periods " of time but stilll does experience the onset of pain. Patient is aware that her disc issue is a main limiting factor. Despite patient continues to make small therapy gains. Patient instructed to return to therapy pending symptoms return or persist.     Discharge reason: Patient has reached the maximum rehab potential for the present time and Patient requested discharge    Annika is progressing well towards her goals.   Pt prognosis is Fair.     Pt will continue to benefit from skilled outpatient physical therapy to address the deficits listed in the problem list box on initial evaluation, provide pt/family education and to maximize pt's level of independence in the home and community environment.     Pt's spiritual, cultural and educational needs considered and pt agreeable to plan of care and goals.     Anticipated barriers to physical therapy: none    Goals:   Short Term Goals: 4 weeks   Patient will be able to resume supine position for 30 minutes, when in bed in order to get a full night of rest. PARTIALLY MET   Patient will be able to stand for 30 mins with min-no pain/difficulty in order to cook and clean dishes. PARTIALLY MET   Increase (B) Hamstring flexibility a 10 degrees in order to reduce stress of low back. PARTIALLY MET     Long Term Goals: 8 weeks   Patient will be able to ambulated for desired distance with min-no pain/difficulty using rolling walker. MET  Patient will be able to resume supine position for 1 hour, when in bed in order to get a full night of rest. PARTIALLY MET  Patient will be able to sit for 1 hour with min-no difficulty in order to watch movie with her family. MET  Patient will be able to stand for 1 hour with min-no pain/difficulty in order to cook and clean dishes. PARTIALLY MET    Plan     This patient is discharged from Physical Therapy      Muriel Cotton, PT, DPT

## 2019-09-05 NOTE — PROGRESS NOTES
"Subjective:       Patient ID: Annika Mac is a 68 y.o. female.    Chief Complaint: Follow-up   This is a 68-year-old who presents today for checkup and follow-up multiple medical problems. Patient has been doing well since her last fall she has had improvement in her wrist pain and follows with ortho she continues to keep braced she has been cautious with her walker and has had no further falls.  She continues to have issues with arthritis in her back and follows with physical medicine.  Patient reports that she has been doing better with her diabetes had his shoes with finances in paying for her medications she previously was on U500 with endocrinology which was later switched to 70 30 she plans to continue that until she is able to afford her U 500 again.  She does try to watch her diet and has been compliant with insulin A1c down a bit since her last visit.  Her weight is trending up although she is not able to be as active    HPI  Review of Systems   Constitutional:        Cautious with her gait  No further falls    Musculoskeletal:        Wrist healing        Objective:     Blood pressure 124/62, pulse 79, height 5' 5" (1.651 m), weight (!) 145.2 kg (320 lb 1.7 oz).    Physical Exam   Constitutional: No distress.   HENT:   Head: Normocephalic.   Mouth/Throat: Oropharynx is clear and moist.   Eyes: No scleral icterus.   Neck: Neck supple.   Cardiovascular: Normal rate and regular rhythm. Exam reveals no gallop and no friction rub.   Murmur heard.  Pulmonary/Chest: Effort normal and breath sounds normal. No respiratory distress.   Breast : normal no masses or tenderness    Abdominal: Soft. Bowel sounds are normal. She exhibits no mass. There is no tenderness.   Musculoskeletal: She exhibits no edema.   Left wrist braced    Neurological: She is alert.   Skin: No erythema.   Psychiatric: She has a normal mood and affect.   Vitals reviewed.      Assessment:       1. Stage 3 chronic kidney disease    2. Type 2 " diabetes mellitus with complication, with long-term current use of insulin    3. Encounter for screening mammogram for breast cancer    4. Lumbar facet arthropathy    5. Spinal stenosis of lumbar region, unspecified whether neurogenic claudication present    6. Diabetic autonomic neuropathy associated with type 2 diabetes mellitus    7. Chronic low back pain with sciatica, sciatica laterality unspecified, unspecified back pain laterality    8. Primary osteoarthritis of right knee    9. Polyneuropathy associated with underlying disease    10. Mixed hyperlipidemia        Plan:       Annika was seen today for follow-up.    Diagnoses and all orders for this visit:    Stage 3 chronic kidney disease  History of she will follow up with Nephrology her previous nephrologist left in she plans to make an  -     Ambulatory consult to Nephrology  -     Ambulatory Referral to Nutrition Services  She is requesting dietary counseling due to her chronic kidney disease and recent medication changes with her cardiologist and her diabetes    Type 2 diabetes mellitus with complication, with long-term current use of insulin  She has had some improvement in her A1c has had some financial constraints with her treatments previously had done well with to [facility] in U 500 but stopped due to financial constraints currently on 70 30 with large amounts of insulin requirements continues to follow with her endocrinologist  -     Ambulatory Referral to Nutrition Services    Encounter for screening mammogram for breast cancer  Schedule when due  -     Mammo Digital Screening Bilat w/ [facility]; Future    Lumbar facet arthropathy  Spinal stenosis of lumbar region, unspecified whether neurogenic claudication present  -     DULoxetine (CYMBALTA) 30 MG capsule; Take 1 capsule (30 mg total) by mouth once daily.    Diabetic autonomic neuropathy associated with type 2 diabetes mellitus  Chronic low back pain with sciatica, sciatica laterality unspecified,  unspecified back pain lateralit    Polyneuropathy associated with underlying disease  -     DULoxetine (CYMBALTA) 30 MG capsule; Take 1 capsule (30 mg total) by mouth once daily.    Mixed hyperlipidemia  She remains on simvastatin had been taking half and discharge but discussed increase tgo 40 mg  Refill provided   -     simvastatin (ZOCOR) 40 MG tablet; Take 1 tablet (40 mg total) by mouth every evening.  -     Lipid panel; Future    She will get her flu shot when available    She has her optho exam scheduled     Follow-up 4 months

## 2019-09-06 DIAGNOSIS — E11.42 TYPE 2 DIABETES MELLITUS WITH DIABETIC POLYNEUROPATHY, WITH LONG-TERM CURRENT USE OF INSULIN: ICD-10-CM

## 2019-09-06 DIAGNOSIS — Z79.4 TYPE 2 DIABETES MELLITUS WITH DIABETIC POLYNEUROPATHY, WITH LONG-TERM CURRENT USE OF INSULIN: ICD-10-CM

## 2019-09-06 NOTE — TELEPHONE ENCOUNTER
----- Message from Jessi Paulino sent at 9/6/2019  9:28 AM CDT -----  Contact: Self [phone]  .Refill request.--PT will be out after tomorrow.     insulin NPH-insulin regular, 70/30, (NOVOLIN 70/30) 100 unit/mL (70-30) injection    ..  [facility] [address] [place] [address] [place] [address] [id number] [place] [address]  Phone: [phone] Fax: [phone]

## 2019-09-08 DIAGNOSIS — E11.42 TYPE 2 DIABETES MELLITUS WITH DIABETIC POLYNEUROPATHY, WITH LONG-TERM CURRENT USE OF INSULIN: ICD-10-CM

## 2019-09-08 DIAGNOSIS — Z79.4 TYPE 2 DIABETES MELLITUS WITH DIABETIC POLYNEUROPATHY, WITH LONG-TERM CURRENT USE OF INSULIN: ICD-10-CM

## 2019-09-10 ENCOUNTER — TELEPHONE (OUTPATIENT)
Dept: ENDOCRINOLOGY | Facility: CLINIC | Age: 69
End: 2019-09-10

## 2019-09-10 DIAGNOSIS — E11.22 TYPE 2 DM WITH CKD STAGE 4 AND HYPERTENSION: Primary | ICD-10-CM

## 2019-09-10 DIAGNOSIS — I12.9 TYPE 2 DM WITH CKD STAGE 4 AND HYPERTENSION: Primary | ICD-10-CM

## 2019-09-10 DIAGNOSIS — N18.4 TYPE 2 DM WITH CKD STAGE 4 AND HYPERTENSION: Primary | ICD-10-CM

## 2019-09-10 NOTE — TELEPHONE ENCOUNTER
----- Message from Ursula Salinas NP sent at 9/10/2019  2:47 PM CDT -----  Order in for diabetes education

## 2019-09-10 NOTE — TELEPHONE ENCOUNTER
----- Message from Ginette Rocha MA sent at 9/10/2019  2:30 PM CDT -----  Contact: self/[phone]-lcki   Pt states she was trying to see if she can move up her appt, Pt states she just got her dexcom and would like to be seen sooner so someone can walk her though on how to work the dexcom and put it on her.     [phone]

## 2019-09-11 ENCOUNTER — OFFICE VISIT (OUTPATIENT)
Dept: ORTHOPEDICS | Facility: CLINIC | Age: 69
End: 2019-09-11
Payer: MEDICARE

## 2019-09-11 VITALS — HEIGHT: 65 IN | BODY MASS INDEX: 48.82 KG/M2 | WEIGHT: 293 LBS

## 2019-09-11 DIAGNOSIS — R52 PAIN: Primary | ICD-10-CM

## 2019-09-11 PROCEDURE — 99214 OFFICE O/P EST MOD 30 MIN: CPT | Mod: PBBFAC,PN | Performed by: ORTHOPAEDIC SURGERY

## 2019-09-11 PROCEDURE — 99999 PR PBB SHADOW E&M-EST. PATIENT-LVL IV: CPT | Mod: PBBFAC,,, | Performed by: ORTHOPAEDIC SURGERY

## 2019-09-11 PROCEDURE — 99213 OFFICE O/P EST LOW 20 MIN: CPT | Mod: S$PBB,,, | Performed by: ORTHOPAEDIC SURGERY

## 2019-09-11 PROCEDURE — 99999 PR PBB SHADOW E&M-EST. PATIENT-LVL IV: ICD-10-PCS | Mod: PBBFAC,,, | Performed by: ORTHOPAEDIC SURGERY

## 2019-09-11 PROCEDURE — 99213 PR OFFICE/OUTPT VISIT, EST, LEVL III, 20-29 MIN: ICD-10-PCS | Mod: S$PBB,,, | Performed by: ORTHOPAEDIC SURGERY

## 2019-09-11 NOTE — PROGRESS NOTES
[facility]  History & Physical  Orthopedics    SUBJECTIVE:      Chief Complaint:  Bilateral radial wrist pain    Referring Provider: No ref. provider found     History of Present Illness:  Patient is a 68 y.o. right hand dominant female who presents today with complaints of bilateral radial sided wrist pain.  She notes that she sustained a low energy fall on June 4, 2019 approximately 10 days ago and ever since that time she has rate had radial sided bilateral wrist pain.  She was placed in a volar short-arm wrist braces in the emergency department and x-rays revealed no acute fractures or dislocations.  She reports that she has been feeling somewhat better since the injury although she still has significant pain.  Of note she is unable to take NSAIDs or Tylenol per her her medical comorbidities by her report.  She reports the left wrist is approximately 8 to 9/10 in severity in the right wrist is 7 to 8/10 severity and she presents. for initial evaluation.  She denies any numbness or tingling or other complaints today..    Interval history July 24, 2019:  The patient returns today and re-evaluation of her bilateral de Quervain tenosynovitis and radial sided wrist pain.  She notes that the right wrist pain has nearly resolved but that her left radial sided wrist pain is still significantly bothering her.  The thumb spica braces has helped as has occupational therapy and she presents for evaluation.    Interval history September 11, 2019:  The patient returns today for re-evaluation.  She underwent de Quervain injections at last visit and he has helped temporarily.  The pain has now returned and is located at the thumb CMC joint.  She has been wearing thumb spica braces which do help her symptoms significantly.  No new complaints and she presents for re-evaluation today.    Onset of symptoms/DOI was 10 days ago.    Symptoms are aggravated by activity and movement.    Symptoms are alleviated by  rest and immobilization.    Symptoms consist of pain.    The patient rates their pain as a 9/10.    Attempted treatment(s) and/or interventions include rest and immobilization.     The patient denies any fevers, chills, N/V, D/C and presents for evaluation.       Past Medical History:   Diagnosis Date    Allergy     Anemia 2012    Arthritis     CHF (congestive heart failure)     CKD (chronic kidney disease) stage 3, GFR 30-59 ml/min 2012    Clotting disorder     Colon polyp     Deep vein thrombophlebitis of left leg 2012    Degenerative disc disease     Diabetic peripheral neuropathy associated with type 2 diabetes mellitus 2012    GERD (gastroesophageal reflux disease)     HTN (hypertension) 2012    Hyperlipidemia 2012    Lead-induced gout of ankle or foot     right going on three weeks    Nonproliferative diabetic retinopathy of right eye 2012    Obstructive sleep apnea 2012    Osteoporosis     Proliferative diabetic retinopathy of left eye 2012    Stroke 2003    Type 2 diabetes mellitus with diabetic polyneuropathy 2012    Ulcer      Past Surgical History:   Procedure Laterality Date    CATARACT EXTRACTION W/  INTRAOCULAR LENS IMPLANT Left 3/2002    left     CATARACT EXTRACTION W/  INTRAOCULAR LENS IMPLANT Right     OD dr. olivares     SECTION      COLONOSCOPY N/A 2018    Performed by [facility] (2ND FLR)    CYST REMOVAL  2011    left side of face    ESOPHAGOGASTRODUODENOSCOPY (EGD) N/A 2018    Performed by [facility] at [facility] (2ND FLR)    EYE SURGERY Bilateral 2012    eye implants    GANGLION CYST EXCISION  2007    Right wrist    INSERTION, IOL PROSTHESIS Right 2012    Performed by Linda Olivares [facility] at [facility] OR 1ST FLR    Pan Retinal Photocoagulation Bilateral     Dr. Monterroso (Proliferative Diabetic Retinopathy)    PHACOEMULSIFICATION, CATARACT  "Right 12/19/2012    Performed by Linda Glover MD at [facility] OR 1ST FLR    TOTAL ABDOMINAL HYSTERECTOMY  1982    TOTAL KNEE ARTHROPLASTY  2/2006    left    TRIGGER FINGER RELEASE  9/18/2009     Review of patient's allergies indicates:   Allergen Reactions    Iodinated contrast media Rash     Social History     Social History Narrative    , lives with family; 3 children, 2 grandchildren     Family History   Problem Relation Age of Onset    Diabetes Mother     Hypertension Mother     Heart disease Father     Diabetes Sister     Thyroid disease Brother     Diabetes Brother     Hypertension Brother     No Known Problems Daughter     No Known Problems Son     No Known Problems Daughter     Amblyopia Neg Hx     Blindness Neg Hx     Glaucoma Neg Hx     Macular degeneration Neg Hx     Retinal detachment Neg Hx     Strabismus Neg Hx     Colon cancer Neg Hx     Esophageal cancer Neg Hx          Current Outpatient Medications:     allopurinol (ZYLOPRIM) 300 MG tablet, Take 300 mg by mouth once daily. , Disp: , Rfl:     amLODIPine (NORVASC) 10 MG tablet, Take 1 tablet (10 mg total) by mouth once daily., Disp: 90 tablet, Rfl: 3    aspirin 81 MG Chew, Take 1 tablet (81 mg total) by mouth once daily., Disp: , Rfl: 0    BD ULTRA-FINE KIKA PEN NEEDLE 32 gauge x 5/32" Ndle, To use 4 times daily, Disp: 200 each, Rfl: 20    blood sugar diagnostic Strp, 1 each by Misc.(Non-Drug; Combo Route) route 4 (four) times daily. Dx code  E11.42 . Pt use one touch verio., Disp: 200 each, Rfl: 12    blood-glucose meter kit, Use as instructed, true test/result meter, Disp: 1 each, Rfl: 0    blood-glucose meter kit, 1 each by Other route once daily. Use daily. Insurance proffered one touch  E11.42, Disp: 1 each, Rfl: 0    chlorthalidone (HYGROTEN) 25 MG Tab, Take 1 tablet (25 mg total) by mouth once daily., Disp: 30 tablet, Rfl: 11    colchicine (COLCRYS) 0.6 mg tablet, Take 0.6 mg by mouth as needed., Disp: " , Rfl:     DULoxetine (CYMBALTA) 30 MG capsule, Take 1 capsule (30 mg total) by mouth once daily., Disp: 90 capsule, Rfl: 3    famotidine (PEPCID) 20 MG tablet, Take 1 tablet (20 mg total) by mouth 2 (two) times daily., Disp: 1 tablet, Rfl: 0    fexofenadine (ALLEGRA) 180 MG tablet, TAKE 1 TABLET BY MOUTH ONCE DAILY, Disp: 30 tablet, Rfl: 0    finasteride (PROSCAR) 5 mg tablet, TAKE 1 TABLET(5 MG) BY MOUTH EVERY DAY, Disp: 90 tablet, Rfl: 1    fluocinonide (LIDEX) 0.05 % external solution, AAA scalp qday - bid prn pruritus, Disp: 60 mL, Rfl: 3    glucagon, human recombinant, (GLUCAGON EMERGENCY KIT, HUMAN,) 1 mg SolR, Inject 1 mg into the muscle as needed., Disp: 1 each, Rfl: 3    insulin NPH-insulin regular, 70/30, (NOVOLIN 70/30 U-100 INSULIN) 100 unit/mL (70-30) injection, INJECT 120 UNITS INTO THE SKIN WITH BREAKFAST, 110 UNITS WITH LUNCH, AND INJECT 90 UNITS WITH DINNER ; , Disp: 300 mL, Rfl: 3    insulin NPH-insulin regular, 70/30, (NOVOLIN 70/30) 100 unit/mL (70-30) injection, Breakfast: 100 -110 units Lunch: 80 - 90 units Dinner: 65 - 70 units MAX TDD: 270, Disp: 4 vial, Rfl: 11    insulin regular 100 unit/mL Inj injection, Inject 120 Units into the skin 3 (three) times daily before meals. 120 units with breakfast, 110 units with lunch, and 90 units with dinner; , Disp: , Rfl:     irbesartan (AVAPRO) 300 MG tablet, Take 1 tablet (300 mg total) by mouth every evening., Disp: 90 tablet, Rfl: 3    ketoconazole (NIZORAL) 2 % shampoo, Wash hair with medicated shampoo at least 2x/week - let sit on scalp at least 5 minutes prior to rinsing, Disp: 120 mL, Rfl: 5    labetalol (NORMODYNE) 300 MG tablet, Take 1 tablet (300 mg total) by mouth 2 (two) times daily., Disp: 180 tablet, Rfl: 3    lancets 31 gauge Misc, 1 lancet by Misc.(Non-Drug; Combo Route) route 4 (four) times daily. E11.42 . Prescribed one touch, Disp: 200 each, Rfl: 6    lancets 33 gauge Misc, 1 lancet by Misc.(Non-Drug;  "Combo Route) route 4 (four) times daily. Dx code E11.42, Disp: 200 each, Rfl: 12    multivitamin (THERAGRAN) per tablet, Take 1 tablet by mouth once daily., Disp: , Rfl:     pregabalin (LYRICA) 150 MG capsule, Take 1 capsule (150 mg total) by mouth 2 (two) times daily., Disp: 60 capsule, Rfl: 5    simvastatin (ZOCOR) 40 MG tablet, Take 1 tablet (40 mg total) by mouth every evening., Disp: 90 tablet, Rfl: 4    triamcinolone acetonide 0.1% (KENALOG) 0.1 % ointment, AAA bid hands, Disp: 60 g, Rfl: 1    TRUETEST TEST STRIPS Strp, 1 strip by Misc.(Non-Drug; Combo Route) route 4 (four) times daily. Dx code E11.42. Prescribed cover by insurance., Disp: 400 strip, Rfl: 4    traMADol (ULTRAM) 50 mg tablet, Take 1 tablet (50 mg total) by mouth every 8 (eight) hours as needed., Disp: 90 tablet, Rfl: 3      Review of Systems:  Constitutional: no fever or chills  Eyes: no visual changes  ENT: no nasal congestion or sore throat  Respiratory: no cough or shortness of breath  Cardiovascular: no chest pain  Gastrointestinal: no nausea or vomiting, tolerating diet  Musculoskeletal: pain and soreness    OBJECTIVE:      Vital Signs (Most Recent):  Vitals:    09/11/19 1036   Weight: (!) 145.2 kg (320 lb 1.7 oz)   Height: 5' 5" (1.651 m)     Body mass index is 53.27 kg/m².      Physical Exam:  Constitutional: The patient appears well-developed and well-nourished. No distress.   Head: Normocephalic and atraumatic.   Nose: Nose normal.   Eyes: Conjunctivae and EOM are normal.   Neck: No tracheal deviation present.   Cardiovascular: Normal rate and intact distal pulses.    Pulmonary/Chest: Effort normal. No respiratory distress.   Abdominal: There is no guarding.   Neurological: The patient is alert.   Psychiatric: The patient has a normal mood and affect.     Bilateral Hand/Wrist Examination:    Observation/Inspection:  Swelling  none    Deformity  none  Discoloration  none     Scars   none    Atrophy  none    HAND/WRIST " EXAMINATION:  Finkelstein's Test   positive  WHAT Test    markedly positive  Snuff box tenderness   Neg  Juarez's Test    Neg  Hook of [place]ate Tenderness  Neg  CMC grind    positive  Circumduction test   positive    Neurovascular Exam:  Digits WWP, brisk CR < 3s throughout  NVI motor/LTS to M/R/U nerves, radial pulse 2+  Tinel's Test - Carpal Tunnel  Neg  Tinel's Test - Cubital Tunnel  Neg  Phalen's Test    Neg  Median Nerve Compression Test Neg    ROM hand/elbow full, painless  Wrist and thumb range of motion painful and limited secondary to pain    Diagnostic Results:     Xray -  x-ray bilateral wrists demonstrates no acute fractures or dislocations, mild did degenerative disease  EMG - none    ASSESSMENT/PLAN:      68 y.o. yo female with bilateral thumb CMC arthritis and bilateral de Quervain tenosynovitis  Plan:  We discussed treatment options including bracing, therapy, injections, and surgery. The patient would like to work on her hemoglobin A1c level and return to further discuss and schedule a CMC arthroplasty should she wish to proceed.  I would be happy to re-evaluate her at any time.      Marky Hagen M.D.     Please be aware that this note has been generated with the assistance of annie voice-to-text.  Please excuse any spelling or grammatical errors.

## 2019-09-12 ENCOUNTER — OFFICE VISIT (OUTPATIENT)
Dept: PODIATRY | Facility: CLINIC | Age: 69
End: 2019-09-12
Payer: MEDICARE

## 2019-09-12 ENCOUNTER — CLINICAL SUPPORT (OUTPATIENT)
Dept: DIABETES | Facility: CLINIC | Age: 69
End: 2019-09-12
Payer: MEDICARE

## 2019-09-12 VITALS
HEIGHT: 65 IN | HEART RATE: 72 BPM | DIASTOLIC BLOOD PRESSURE: 69 MMHG | WEIGHT: 293 LBS | BODY MASS INDEX: 48.82 KG/M2 | SYSTOLIC BLOOD PRESSURE: 106 MMHG

## 2019-09-12 DIAGNOSIS — I12.9 TYPE 2 DM WITH CKD STAGE 4 AND HYPERTENSION: Primary | ICD-10-CM

## 2019-09-12 DIAGNOSIS — M21.42 PES PLANUS OF BOTH FEET: Primary | ICD-10-CM

## 2019-09-12 DIAGNOSIS — E11.42 DIABETIC POLYNEUROPATHY ASSOCIATED WITH TYPE 2 DIABETES MELLITUS: ICD-10-CM

## 2019-09-12 DIAGNOSIS — E11.22 TYPE 2 DM WITH CKD STAGE 4 AND HYPERTENSION: ICD-10-CM

## 2019-09-12 DIAGNOSIS — M76.821 POSTERIOR TIBIAL TENDINITIS OF RIGHT LEG: ICD-10-CM

## 2019-09-12 DIAGNOSIS — M21.41 PES PLANUS OF BOTH FEET: Primary | ICD-10-CM

## 2019-09-12 DIAGNOSIS — E11.22 TYPE 2 DM WITH CKD STAGE 4 AND HYPERTENSION: Primary | ICD-10-CM

## 2019-09-12 DIAGNOSIS — N18.4 TYPE 2 DM WITH CKD STAGE 4 AND HYPERTENSION: Primary | ICD-10-CM

## 2019-09-12 DIAGNOSIS — I12.9 TYPE 2 DM WITH CKD STAGE 4 AND HYPERTENSION: ICD-10-CM

## 2019-09-12 DIAGNOSIS — N18.4 TYPE 2 DM WITH CKD STAGE 4 AND HYPERTENSION: ICD-10-CM

## 2019-09-12 PROCEDURE — 11056 PR TRIM BENIGN HYPERKERATOTIC SKIN LESION,2-4: ICD-10-PCS | Mod: S$PBB,Q9,, | Performed by: PODIATRIST

## 2019-09-12 PROCEDURE — 11056 PARNG/CUTG B9 HYPRKR LES 2-4: CPT | Mod: S$PBB,Q9,, | Performed by: PODIATRIST

## 2019-09-12 PROCEDURE — 99999 PR PBB SHADOW E&M-EST. PATIENT-LVL I: ICD-10-PCS | Mod: PBBFAC,,,

## 2019-09-12 PROCEDURE — 99214 OFFICE O/P EST MOD 30 MIN: CPT | Mod: 25,S$PBB,, | Performed by: PODIATRIST

## 2019-09-12 PROCEDURE — 99211 OFF/OP EST MAY X REQ PHY/QHP: CPT | Mod: PBBFAC,25

## 2019-09-12 PROCEDURE — 11721 PR DEBRIDEMENT OF NAILS, 6 OR MORE: ICD-10-PCS | Mod: 59,Q9,S$PBB, | Performed by: PODIATRIST

## 2019-09-12 PROCEDURE — 99999 PR PBB SHADOW E&M-EST. PATIENT-LVL I: CPT | Mod: PBBFAC,,,

## 2019-09-12 PROCEDURE — 11721 DEBRIDE NAIL 6 OR MORE: CPT | Mod: 59,Q9,S$PBB, | Performed by: PODIATRIST

## 2019-09-12 PROCEDURE — 99213 OFFICE O/P EST LOW 20 MIN: CPT | Mod: PBBFAC,25,27 | Performed by: PODIATRIST

## 2019-09-12 PROCEDURE — 99999 PR PBB SHADOW E&M-EST. PATIENT-LVL III: ICD-10-PCS | Mod: PBBFAC,,, | Performed by: PODIATRIST

## 2019-09-12 PROCEDURE — 99999 PR PBB SHADOW E&M-EST. PATIENT-LVL III: CPT | Mod: PBBFAC,,, | Performed by: PODIATRIST

## 2019-09-12 PROCEDURE — 99214 PR OFFICE/OUTPT VISIT, EST, LEVL IV, 30-39 MIN: ICD-10-PCS | Mod: 25,S$PBB,, | Performed by: PODIATRIST

## 2019-09-12 PROCEDURE — 95249 CONT GLUC MNTR PT PROV EQP: CPT | Mod: PBBFAC

## 2019-09-12 PROCEDURE — 11721 DEBRIDE NAIL 6 OR MORE: CPT | Mod: PBBFAC | Performed by: PODIATRIST

## 2019-09-12 PROCEDURE — 11056 PARNG/CUTG B9 HYPRKR LES 2-4: CPT | Mod: PBBFAC | Performed by: PODIATRIST

## 2019-09-12 NOTE — PROGRESS NOTES
"DEXCOM G5 SENSOR START   Date of trainin2019     Patient referred to  clinic today for  Dexcom G5 continuous glucose sensor system training.  Patient arrived with her  fully charged. Education provided using  "Quick Start Guide" per Dexcom protocol.  Pt will be using her Dexcom  to receive BG readings.     ·  Overview:  5min glucose reading updates, trending arrows, BG graph screens, battery life indicator, Blue Tooth Symbol.  ·  Menus: trend Graph, start sensor, enter BG, events, Alerts, Settings, Shutdown, Stop Sensor.   ·  Screens and prompts:                          * Double blood drop (for start up BG 2 hrs after starting sensor)                          * Shaded out blood drop (one more start up BG)                          * Single Blood drop (calibration update, due every 12 hrs)                           * Low battery Notification   · The  transmitter ID was programmed into . Transmitter SN [id number]  ·  Settings:                          * Low alert: 80 mg/dL                          * High alert: 220 mg/dL                          * Rise rate: off                          * Fall Rate: off                          * Urgent Low: 55 mg/dL                          * Signal Loss: on                          * No Reading: on                          * Always sound: high                          * Alert Schedule:  (instructed on how to set up alert schedule if desired)     · Reviewed sensor site selection. Site selected and prepped using aseptic technique Inserted to  left abdomin. Transmitter placed in pod and secured.  · Practiced sensor pod/transmitter removal from site, and removal of transmitter from sensor pod.  · Patient able to demonstrate without difficulty.  Encouraged to review manual prior to starting another sensor.   · Reviewed  problem solving aspects of sensor transmission/ variables that can disrupt RF transmission.  range  " 20 feet, but the first 3 hrs keep within 3 feet of transmitter.  · Advised that acetaminophen use will disrupt normal Dexcom G5 sensor results.  · Cautioned regarding exposure to x-ray, CT, MRI scans  · Pt instructed on lag time of interstitial fluid from CBG and was advised to tx hypoglycemia and dose insulin based on SMBG values.  · Dexcom technical support contact number given and examples of when to contact them discussed. Pt will download software at home for Dexcom download.      Patient and significant other verbalized understanding.  Patient scheduled for f/u.        Time spent with patient: 60 minutes

## 2019-09-13 ENCOUNTER — TELEPHONE (OUTPATIENT)
Dept: ENDOCRINOLOGY | Facility: CLINIC | Age: 69
End: 2019-09-13

## 2019-09-13 NOTE — TELEPHONE ENCOUNTER
----- Message from Lake Mac, Patient Care Assistant sent at 9/13/2019  1:13 PM CDT -----  Contact: Self  Pt is calling because her blood sugar readings are getting real low, she wants to know if her insulin can be adjusted.    Pt can be reached at [phone].    Thank you

## 2019-09-13 NOTE — TELEPHONE ENCOUNTER
Spoke with pt and  she was concern about her low BG . Pt was having low every day. This morning her  BG was 101.  Pt taking 110 units with large meal , 100 units with small meal, lunch time 90 units , small will be 80 units and large will be 70 units . Pt wants to cut down her large meal insulin because she dont eat big meal or lunch.   Pt stated that she is doing good with her Dexcom.     Please advise.

## 2019-09-15 NOTE — PROGRESS NOTES
Subjective:      Patient ID: Annika Mac is a 68 y.o. female.    Chief Complaint: Type II diabetes mellitus with neurological manifestations (bilateral pcp Dr Cotton 9/4/19) and Foot Pain (rt foot inner area)    Annika is a 68 y.o. female who presents to the clinic for evaluation and treatment of high risk feet. Annika has a past medical history of Allergy, Anemia (7/27/2012), Arthritis, CHF (congestive heart failure), CKD (chronic kidney disease) stage 3, GFR 30-59 ml/min (7/27/2012), Clotting disorder, Colon polyp, Deep vein thrombophlebitis of left leg (7/27/2012), Degenerative disc disease, Diabetic peripheral neuropathy associated with type 2 diabetes mellitus (7/27/2012), GERD (gastroesophageal reflux disease), HTN (hypertension) (7/27/2012), Hyperlipidemia (7/27/2012), Lead-induced gout of ankle or foot, Nonproliferative diabetic retinopathy of right eye (7/27/2012), Obstructive sleep apnea (7/27/2012), Osteoporosis, Proliferative diabetic retinopathy of left eye (7/27/2012), Stroke (8/1/2003), Type 2 diabetes mellitus with diabetic polyneuropathy (7/27/2012), and Ulcer. The patient's chief complaint is long, thick toenails and calluses on both feet . No other major pedal complaintsThis patient has documented high risk feet requiring routine maintenance secondary to diabetes mellitis and those secondary complications of diabetes, as mentioned..  She has had an increase in neuropathy pain bilaterally and is in need of a new prescription for diabetic shoe gear.       PCP: Mamie Cotton MD    Date Last Seen by PCP:       Chief Complaint   Patient presents with    Type II diabetes mellitus with neurological manifestations     bilateral pcp Dr Cotton 9/4/19    Foot Pain     rt foot inner area       Current shoe gear: black leather DM shoes w/ custom inserts     Hemoglobin A1C   Date Value Ref Range Status   08/05/2019 8.8 (H) 4.0 - 5.6 % Final     Comment:     ADA Screening Guidelines:  5.7-6.4%  Consistent  with prediabetes  >or=6.5%  Consistent with diabetes  High levels of fetal hemoglobin interfere with the HbA1C  assay. Heterozygous hemoglobin variants (HbS, HgC, etc)do  not significantly interfere with this assay.   However, presence of multiple variants may affect accuracy.     05/01/2019 9.9 (H) 4.0 - 5.6 % Final     Comment:     ADA Screening Guidelines:  5.7-6.4%  Consistent with prediabetes  >or=6.5%  Consistent with diabetes  High levels of fetal hemoglobin interfere with the HbA1C  assay. Heterozygous hemoglobin variants (HbS, HgC, etc)do  not significantly interfere with this assay.   However, presence of multiple variants may affect accuracy.     01/02/2019 9.3 (H) 4.0 - 5.6 % Final     Comment:     ADA Screening Guidelines:  5.7-6.4%  Consistent with prediabetes  >or=6.5%  Consistent with diabetes  High levels of fetal hemoglobin interfere with the HbA1C  assay. Heterozygous hemoglobin variants (HbS, HgC, etc)do  not significantly interfere with this assay.   However, presence of multiple variants may affect accuracy.         Review of Systems   Constitution: Negative for chills, decreased appetite and fever.   Cardiovascular: Negative for leg swelling.   Skin: Positive for dry skin and nail changes. Negative for color change, flushing, itching, poor wound healing, rash and skin cancer.   Musculoskeletal: Positive for arthritis. Negative for back pain, gout, joint pain, joint swelling and myalgias.   Gastrointestinal: Negative for nausea and vomiting.   Neurological: Positive for numbness. Negative for loss of balance and paresthesias.           Objective:      Physical Exam   Constitutional: She is oriented to person, place, and time. She appears well-developed and well-nourished. No distress.   Cardiovascular:   Dorsalis pedis and posterior tibial pulses are diminished Bilaterally. Toes are cool to touch. Feet are warm proximally.There is decreased digital hair. Skin is atrophic, slightly  hyperpigmented, and mildly edematous       Musculoskeletal: Normal range of motion. She exhibits no edema, tenderness or deformity.   Equinus noted b/l ankles, gastroc. Adequate joint range of motion without pain, limitation, nor crepitation Bilateral pedal joints. Muscle strength is 5/5 in all groups bilaterally.        Neurological: She is alert and oriented to person, place, and time.   [place] 5.07 monofilamant testing is diminished [place] feet. Sharp/dull sensation diminished Bilaterally. Light touch absent Bilaterally.       Skin: Skin is warm, dry and intact. No abrasion, no bruising, no burn, no ecchymosis, no laceration, no lesion, no petechiae and no rash noted. She is not diaphoretic. No pallor.   Nails 1-5 b/l  are elongated by  2-6 mm's, thickened by 3-5 mm's, dystrophic, and are darkened in  coloration . Xerosis Bilaterally. No open lesions noted.      Hyperkeratotic tissue noted to distal 2-4 toe tips b/l.   Psychiatric: She has a normal mood and affect. Thought content normal.   Nursing note and vitals reviewed.            Assessment:       Encounter Diagnoses   Name Primary?    Pes planus of both feet Yes    Diabetic polyneuropathy associated with type 2 diabetes mellitus     Posterior tibial tendinitis of right leg          Plan:       Annika was seen today for type ii diabetes mellitus with neurological manifestations and foot pain.    Diagnoses and all orders for this visit:    Pes planus of both feet  -     DIABETIC SHOES FOR HOME USE    Diabetic polyneuropathy associated with type 2 diabetes mellitus  -     DIABETIC SHOES FOR HOME USE    Posterior tibial tendinitis of right leg          Routine Foot Care    Performed by:  Jean Carlos Pacheco. DPM  Authorized by:  Patient     Consent Done?:  Yes (Verbal)     Nail Care Type:  Debride  Location(s): All  (Left 1st Toe, Left 3rd Toe, Left 2nd Toe, Left 4th Toe, Left 5th Toe, Right 1st Toe, Right 2nd Toe, Right 3rd Toe, Right 4th Toe and Right  5th Toe)  Patient tolerance:  Patient tolerated the procedure well with no immediate complications     With patient's permission, the toenails mentioned above were aggressively reduced and debrided using a nail nipper, removing all offending nail and debris. The patient will continue to monitor the areas daily, inspect the feet, wear protective shoe gear when ambulatory, and moisturizer to maintain skin integrity.      Callus Care Type: Debride    With patient's permission, the calluses/hyperkeratotic lesions mentioned above were aggressively reduced and debrided using a number 15 blade. The patient will continue to monitor the areas daily, inspect the feet, wear protective shoe gear when ambulatory, and moisturizer to maintain skin integrity.       I counseled the patient on her conditions, their implications and medical management.        - Shoe inspection. Diabetic Foot Education. Patient reminded of the importance of good nutrition and blood sugar control to help prevent podiatric complications of diabetes. Patient instructed on proper foot hygeine. We discussed wearing proper shoe gear, daily foot inspections, never walking without protective shoe gear, never putting sharp instruments to feet, routine podiatric nail visits every 2-3 months.

## 2019-09-17 ENCOUNTER — TELEPHONE (OUTPATIENT)
Dept: DIABETES | Facility: CLINIC | Age: 69
End: 2019-09-17

## 2019-09-19 ENCOUNTER — HOSPITAL ENCOUNTER (OUTPATIENT)
Dept: RADIOLOGY | Facility: HOSPITAL | Age: 69
Discharge: HOME | End: 2019-09-19
Attending: INTERNAL MEDICINE
Payer: MEDICARE

## 2019-09-19 ENCOUNTER — OFFICE VISIT (OUTPATIENT)
Dept: INTERNAL MEDICINE | Facility: CLINIC | Age: 69
End: 2019-09-19
Payer: MEDICARE

## 2019-09-19 ENCOUNTER — TELEPHONE (OUTPATIENT)
Dept: INTERNAL MEDICINE | Facility: CLINIC | Age: 69
End: 2019-09-19

## 2019-09-19 VITALS
TEMPERATURE: 98 F | HEART RATE: 86 BPM | BODY MASS INDEX: 43.4 KG/M2 | DIASTOLIC BLOOD PRESSURE: 54 MMHG | OXYGEN SATURATION: 97 % | WEIGHT: 293 LBS | SYSTOLIC BLOOD PRESSURE: 130 MMHG | HEIGHT: 69 IN

## 2019-09-19 DIAGNOSIS — J20.9 BRONCHITIS WITH BRONCHOSPASM: Primary | ICD-10-CM

## 2019-09-19 DIAGNOSIS — J20.9 BRONCHITIS WITH BRONCHOSPASM: ICD-10-CM

## 2019-09-19 PROCEDURE — 99999 PR PBB SHADOW E&M-EST. PATIENT-LVL III: ICD-10-PCS | Mod: PBBFAC,,, | Performed by: INTERNAL MEDICINE

## 2019-09-19 PROCEDURE — 99999 PR PBB SHADOW E&M-EST. PATIENT-LVL III: CPT | Mod: PBBFAC,,, | Performed by: INTERNAL MEDICINE

## 2019-09-19 PROCEDURE — 99214 OFFICE O/P EST MOD 30 MIN: CPT | Mod: S$PBB,,, | Performed by: INTERNAL MEDICINE

## 2019-09-19 PROCEDURE — 99214 PR OFFICE/OUTPT VISIT, EST, LEVL IV, 30-39 MIN: ICD-10-PCS | Mod: S$PBB,,, | Performed by: INTERNAL MEDICINE

## 2019-09-19 PROCEDURE — 71046 XR CHEST PA AND LATERAL: ICD-10-PCS | Mod: 26,,, | Performed by: RADIOLOGY

## 2019-09-19 PROCEDURE — 71046 X-RAY EXAM CHEST 2 VIEWS: CPT | Mod: TC

## 2019-09-19 PROCEDURE — 94640 AIRWAY INHALATION TREATMENT: CPT | Mod: PBBFAC

## 2019-09-19 PROCEDURE — 71046 X-RAY EXAM CHEST 2 VIEWS: CPT | Mod: 26,,, | Performed by: RADIOLOGY

## 2019-09-19 PROCEDURE — 99213 OFFICE O/P EST LOW 20 MIN: CPT | Mod: PBBFAC,25 | Performed by: INTERNAL MEDICINE

## 2019-09-19 RX ORDER — ALBUTEROL SULFATE 90 UG/1
2 AEROSOL, METERED RESPIRATORY (INHALATION) EVERY 6 HOURS PRN
Qty: 1 EACH | Refills: 11 | Status: SHIPPED | OUTPATIENT
Start: 2019-09-19 | End: 2019-09-20 | Stop reason: SDUPTHER

## 2019-09-19 RX ORDER — ALBUTEROL SULFATE 0.83 MG/ML
2.5 SOLUTION RESPIRATORY (INHALATION)
Status: COMPLETED | OUTPATIENT
Start: 2019-09-19 | End: 2019-09-19

## 2019-09-19 RX ORDER — PROMETHAZINE HYDROCHLORIDE AND CODEINE PHOSPHATE 6.25; 1 MG/5ML; MG/5ML
SOLUTION ORAL
Qty: 120 ML | Refills: 0 | Status: SHIPPED | OUTPATIENT
Start: 2019-09-19 | End: 2019-09-20 | Stop reason: SDUPTHER

## 2019-09-19 RX ORDER — LEVOFLOXACIN 500 MG/1
500 TABLET, FILM COATED ORAL DAILY
Qty: 10 TABLET | Refills: 0 | Status: SHIPPED | OUTPATIENT
Start: 2019-09-19 | End: 2019-09-20 | Stop reason: SDUPTHER

## 2019-09-19 RX ADMIN — ALBUTEROL SULFATE 2.5 MG: 2.5 SOLUTION INTRABRONCHIAL at 11:09

## 2019-09-19 NOTE — TELEPHONE ENCOUNTER
----- Message from Oumou S Minesh sent at 9/19/2019  4:47 PM CDT -----  Contact: Pt self Mobile [phone] or Home [phone]  Patient is calling in regards to her scripts for albuterol (PROVENTIL/VENTOLIN HFA) 90 mcg/actuation inhaler   levoFLOXacin (LEVAQUIN) 500 MG tablet and    promethazine-codeine 6.25-10 mg/5 ml (PHENERGAN WITH CODEINE) 6.25-10 mg/5 mL syrup being sent to the wrong pharmacy. Eleven refills was sent  to [facility] [address] [place], [place] [address]AND DR AT [facility] OF [address] and the patient said it was suppose to be sent to [facility] [address] [place] [phone],   Comment: [facility] [address] [place] [address] [address] AT [facility] OF [address] [place] [address] off of her chart please.

## 2019-09-19 NOTE — PROGRESS NOTES
Subjective:       Patient ID: Annika Mac is a 68 y.o. female.    Chief Complaint: URI (cough )    68 year old uncontrolled diabetic lady complains of heavy cough productive of brown sputum and wheezing.  Has chills and sweats but no measured fever    Review of Systems   Constitutional: Negative for activity change, chills, fatigue and fever.   HENT: Negative for congestion, ear pain, nosebleeds, postnasal drip, sinus pressure and sore throat.    Eyes: Negative.  Negative for visual disturbance.   Respiratory: Negative for cough, chest tightness, shortness of breath and wheezing.    Cardiovascular: Negative for chest pain.   Gastrointestinal: Negative for abdominal pain, diarrhea, nausea and vomiting.   Genitourinary: Negative for difficulty urinating, dysuria, frequency and urgency.   Musculoskeletal: Negative for arthralgias and neck stiffness.   Skin: Negative for rash.   Neurological: Negative for dizziness, weakness and headaches.   Psychiatric/Behavioral: Negative for sleep disturbance. The patient is not nervous/anxious.        Objective:      Physical Exam   Constitutional: She is oriented to person, place, and time. She appears well-developed and well-nourished.  Non-toxic appearance. No distress.   HENT:   Head: Normocephalic and atraumatic.   Right Ear: Tympanic membrane, external ear and ear canal normal.   Left Ear: Tympanic membrane, external ear and ear canal normal.   Eyes: Pupils are equal, round, and reactive to light. EOM are normal. No scleral icterus.   Neck: Normal range of motion. Neck supple. No thyromegaly present.   Cardiovascular: Normal rate, regular rhythm and normal heart sounds.   Pulmonary/Chest: Effort normal. She has decreased breath sounds in the right upper field, the right middle field, the right lower field, the left upper field, the left middle field and the left lower field. She has wheezes in the right lower field and the left lower field. She has rhonchi in the right middle  field, the right lower field, the left middle field and the left lower field. She has no rales.           Abdominal: Soft. Bowel sounds are normal. She exhibits no mass. There is no tenderness. There is no rebound.   Musculoskeletal: Normal range of motion.   Lymphadenopathy:     She has no cervical adenopathy.   Neurological: She is alert and oriented to person, place, and time. She has normal reflexes. She displays normal reflexes. No cranial nerve deficit. She exhibits normal muscle tone. Coordination normal.   Skin: Skin is warm and dry.   Psychiatric: She has a normal mood and affect. Her behavior is normal.       Assessment:       1. Bronchitis with bronchospasm        Plan:   Annika was seen today for uri.    Diagnoses and all orders for this visit:    Bronchitis with bronchospasm  -     X-Ray Chest PA And Lateral; Future    Other orders  -     albuterol nebulizer solution 2.5 mg

## 2019-09-19 NOTE — PROGRESS NOTES
....Two identifiers were used Name and D.O.B pt tolerated treatment well pt was instructed to wait for further instructions

## 2019-09-20 ENCOUNTER — TELEPHONE (OUTPATIENT)
Dept: INTERNAL MEDICINE | Facility: CLINIC | Age: 69
End: 2019-09-20

## 2019-09-20 RX ORDER — PROMETHAZINE HYDROCHLORIDE 6.25 MG/5ML
6.25 SYRUP ORAL EVERY 8 HOURS PRN
Qty: 118 ML | Refills: 0 | Status: SHIPPED | OUTPATIENT
Start: 2019-09-20 | End: 2020-02-12

## 2019-09-20 RX ORDER — ALBUTEROL SULFATE 90 UG/1
2 AEROSOL, METERED RESPIRATORY (INHALATION) EVERY 6 HOURS PRN
Qty: 1 EACH | Refills: 11 | Status: SHIPPED | OUTPATIENT
Start: 2019-09-20 | End: 2019-09-26 | Stop reason: CLARIF

## 2019-09-20 RX ORDER — LEVOFLOXACIN 500 MG/1
500 TABLET, FILM COATED ORAL DAILY
Qty: 10 TABLET | Refills: 0 | Status: SHIPPED | OUTPATIENT
Start: 2019-09-20 | End: 2019-09-30

## 2019-09-20 RX ORDER — PROMETHAZINE HYDROCHLORIDE AND CODEINE PHOSPHATE 6.25; 1 MG/5ML; MG/5ML
SOLUTION ORAL
Qty: 120 ML | Refills: 0 | Status: SHIPPED | OUTPATIENT
Start: 2019-09-20 | End: 2019-09-20 | Stop reason: ALTCHOICE

## 2019-09-20 NOTE — TELEPHONE ENCOUNTER
Spoke with pharmcy  Due due duplication of meds  She will use plain phergan syrub instead  Phenergan with codein  rx sent   And cancelled phenergan with codeine

## 2019-09-20 NOTE — TELEPHONE ENCOUNTER
Sent to alternate pharmacy [facility] as requested   Please call and cancel her rx at the [facility]  She can not have them at both pharmacies

## 2019-09-20 NOTE — TELEPHONE ENCOUNTER
----- Message from Oksana Cisneros sent at 9/20/2019  3:22 PM CDT -----  Contact: /[facility]  Pharmacy is calling to clarify an RX.  RX name:  promethazine-codeine 6.25-10 mg/5 ml (PHENERGAN WITH CODEINE) 6.25  What do they need to clarify:  traMADol (ULTRAM) 50 mg tablet  Comments:      [facility] [address] [place] [id number] [facility] [address] [phone] (Phone)  [phone] (Fax)

## 2019-09-23 ENCOUNTER — PATIENT OUTREACH (OUTPATIENT)
Dept: DIABETES | Facility: CLINIC | Age: 69
End: 2019-09-23

## 2019-09-23 RX ORDER — LABETALOL 300 MG/1
300 TABLET, FILM COATED ORAL 2 TIMES DAILY
Qty: 180 TABLET | Refills: 3 | Status: SHIPPED | OUTPATIENT
Start: 2019-09-23 | End: 2020-12-15 | Stop reason: SDUPTHER

## 2019-09-23 NOTE — PROGRESS NOTES
09/20/2019  Pt seen today for help with her Dexcom . Inserted new sensor on 09/19 and was unable to obtain readings.  Current sensor appeared properly inserted.  Restarted sensor session.  Advised to call Dexcom if she has further problems. Understanding verbalized.

## 2019-09-24 ENCOUNTER — HOSPITAL ENCOUNTER (OUTPATIENT)
Dept: RADIOLOGY | Facility: HOSPITAL | Age: 69
Discharge: HOME | End: 2019-09-24
Attending: NURSE PRACTITIONER
Payer: MEDICARE

## 2019-09-24 ENCOUNTER — OFFICE VISIT (OUTPATIENT)
Dept: URGENT CARE | Facility: CLINIC | Age: 69
End: 2019-09-24
Payer: MEDICARE

## 2019-09-24 VITALS
OXYGEN SATURATION: 98 % | WEIGHT: 293 LBS | BODY MASS INDEX: 48.82 KG/M2 | SYSTOLIC BLOOD PRESSURE: 135 MMHG | DIASTOLIC BLOOD PRESSURE: 62 MMHG | HEIGHT: 65 IN | HEART RATE: 71 BPM | TEMPERATURE: 99 F

## 2019-09-24 DIAGNOSIS — R05.9 COUGH: ICD-10-CM

## 2019-09-24 DIAGNOSIS — R05.9 COUGH: Primary | ICD-10-CM

## 2019-09-24 PROCEDURE — 99999 PR PBB SHADOW E&M-EST. PATIENT-LVL V: CPT | Mod: PBBFAC,,, | Performed by: NURSE PRACTITIONER

## 2019-09-24 PROCEDURE — 71046 X-RAY EXAM CHEST 2 VIEWS: CPT | Mod: 26,,, | Performed by: RADIOLOGY

## 2019-09-24 PROCEDURE — 99214 PR OFFICE/OUTPT VISIT, EST, LEVL IV, 30-39 MIN: ICD-10-PCS | Mod: S$PBB,,, | Performed by: NURSE PRACTITIONER

## 2019-09-24 PROCEDURE — 71046 XR CHEST PA AND LATERAL: ICD-10-PCS | Mod: 26,,, | Performed by: RADIOLOGY

## 2019-09-24 PROCEDURE — 99214 OFFICE O/P EST MOD 30 MIN: CPT | Mod: S$PBB,,, | Performed by: NURSE PRACTITIONER

## 2019-09-24 PROCEDURE — 99215 OFFICE O/P EST HI 40 MIN: CPT | Mod: PBBFAC,25,PO | Performed by: NURSE PRACTITIONER

## 2019-09-24 PROCEDURE — 99999 PR PBB SHADOW E&M-EST. PATIENT-LVL V: ICD-10-PCS | Mod: PBBFAC,,, | Performed by: NURSE PRACTITIONER

## 2019-09-24 PROCEDURE — 71046 X-RAY EXAM CHEST 2 VIEWS: CPT | Mod: TC,FY,PO

## 2019-09-24 NOTE — PATIENT INSTRUCTIONS
Acute Bronchitis  Your healthcare provider has told you that you have acute bronchitis. Bronchitis is infection or inflammation of the bronchial tubes (airways in the lungs). Normally, air moves easily in and out of the airways. Bronchitis narrows the airways, making it harder for air to flow in and out of the lungs. This causes symptoms such as shortness of breath, coughing up yellow or green mucus, and wheezing. Bronchitis can be acute or chronic. Acute means the condition comes on quickly and goes away in a short time, usually within 3 to 10 days. Chronic means a condition lasts a long time and often comes back.    What causes acute bronchitis?  Acute bronchitis almost always starts as a viral respiratory infection, such as a cold or the flu. Certain factors make it more likely for a cold or flu to turn into bronchitis. These include being very young, being elderly, having a heart or lung problem, or having a weak immune system. Cigarette smoking also makes bronchitis more likely.  When bronchitis develops, the airways become swollen. The airways may also become infected with bacteria. This is known as a secondary infection.  Diagnosing acute bronchitis  Your healthcare provider will examine you and ask about your symptoms and health history. You may also have a sputum culture to test the fluid in your lungs. Chest X-rays may be done to look for infection in the lungs.  Treating acute bronchitis  Bronchitis usually clears up as the cold or flu goes away. You can help feel better faster by doing the following:  · Take medicine as directed. You may be told to take ibuprofen or other over-the-counter medicines. These help relieve inflammation in your bronchial tubes. Your healthcare provider may prescribe an inhaler to help open up the bronchial tubes. Most of the time, acute bronchitis is caused by a viral infection. Antibiotics are usually not prescribed for viral infections.  · Drink plenty of fluids, such as  water, juice, or warm soup. Fluids loosen mucus so that you can cough it up. This helps you breathe more easily. Fluids also prevent dehydration.  · Make sure you get plenty of rest.  · Do not smoke. Do not allow anyone else to smoke in your home.  Recovery and follow-up  Follow up with your doctor as you are told. You will likely feel better in a week or two. But a dry cough can linger beyond that time. Let your doctor know if you still have symptoms (other than a dry cough) after 2 weeks, or if youre prone to getting bronchial infections. Take steps to protect yourself from future infections. These steps include stopping smoking and avoiding tobacco smoke, washing your hands often, and getting a yearly flu shot.  When to call your healthcare provider  Call the healthcare provider if you have any of the following:  · Fever of 100.4°F (38.0°C) or higher, or as advised  · Symptoms that get worse, or new symptoms  · Trouble breathing  · Symptoms that dont start to improve within a week, or within 3 days of taking antibiotics   Date Last Reviewed: 12/1/2016  © 7784-9896 [employer]. [address] [place] [address], [place] [address]. All rights reserved. This information is not intended as a substitute for professional medical care. Always follow your healthcare professional's instructions.

## 2019-09-24 NOTE — PROGRESS NOTES
"Subjective:       Patient ID: Annika Mac is a 68 y.o. female.    Chief Complaint: Cough (patient states she was prescribed some meds for symptoms Promethazine, levoflexacin but is not giving any relief)    HPI  Seen five days ago for cough. Given levofloxacin and, albuterol, and  cough syrup. Returns today stating she has continued to cough and now her ribs are hurting. No fever, chills, sinus problems. Is wearing a monitor for blood sugars. Last A1C 8.8.   /62 (BP Location: Right arm, Patient Position: Sitting, BP Method: Medium (Automatic))   Pulse 71   Temp 98.5 °F (36.9 °C) (Oral)   Ht 5' 5" (1.651 m)   Wt (!) 141 kg (310 lb 13.6 oz)   SpO2 98%   BMI 51.73 kg/m²     Review of Systems   Constitutional: Negative for chills, diaphoresis and fever.   HENT: Negative for congestion, postnasal drip, sinus pressure and sore throat.    Respiratory: Positive for cough and chest tightness. Negative for shortness of breath.    Cardiovascular: Negative for chest pain and palpitations.   Gastrointestinal: Negative for abdominal distention, abdominal pain, diarrhea, nausea and vomiting.   Genitourinary: Negative for difficulty urinating.   Musculoskeletal: Negative for myalgias.   Skin: Negative for rash and wound.   Allergic/Immunologic: Negative for immunocompromised state.   Neurological: Negative for headaches.   Hematological: Does not bruise/bleed easily.   Psychiatric/Behavioral: Negative for behavioral problems and confusion.       Objective:      Physical Exam   Constitutional: She is oriented to person, place, and time. She appears well-developed and well-nourished. No distress.   HENT:   Head: Normocephalic and atraumatic.   Right Ear: Tympanic membrane and ear canal normal.   Left Ear: Tympanic membrane and ear canal normal.   Nose: Nose normal. No mucosal edema. Right sinus exhibits no maxillary sinus tenderness and no frontal sinus tenderness. Left sinus exhibits no maxillary sinus tenderness and no " frontal sinus tenderness.   Mouth/Throat: Uvula is midline. No oropharyngeal exudate, posterior oropharyngeal edema or posterior oropharyngeal erythema.   Eyes: Pupils are equal, round, and reactive to light. Conjunctivae and EOM are normal.   Neck: Neck supple.   Cardiovascular: Normal rate, regular rhythm and intact distal pulses.   No murmur heard.  Pulmonary/Chest: Breath sounds normal. No respiratory distress. She has no wheezes.   Abdominal: Soft. Bowel sounds are normal. There is no tenderness.   Musculoskeletal: Normal range of motion. She exhibits no edema or deformity.   Lymphadenopathy:     She has no cervical adenopathy.   Neurological: She is alert and oriented to person, place, and time.   Skin: Skin is warm and dry. No rash noted. She is not diaphoretic.   Psychiatric: She has a normal mood and affect. Her behavior is normal.   Vitals reviewed.      Assessment:       1. Cough        Plan:       Annika was seen today for cough.    Diagnoses and all orders for this visit:    Cough  -     X-Ray Chest PA And Lateral; Future    Hesitant to use steroids due to diabetes. Will recheck xray. If not improving advised she can discuss steroid use with PCP. Sats are good, patient looks clinically stable.   Patient Instructions       Acute Bronchitis  Your healthcare provider has told you that you have acute bronchitis. Bronchitis is infection or inflammation of the bronchial tubes (airways in the lungs). Normally, air moves easily in and out of the airways. Bronchitis narrows the airways, making it harder for air to flow in and out of the lungs. This causes symptoms such as shortness of breath, coughing up yellow or green mucus, and wheezing. Bronchitis can be acute or chronic. Acute means the condition comes on quickly and goes away in a short time, usually within 3 to 10 days. Chronic means a condition lasts a long time and often comes back.    What causes acute bronchitis?  Acute bronchitis almost always starts  as a viral respiratory infection, such as a cold or the flu. Certain factors make it more likely for a cold or flu to turn into bronchitis. These include being very young, being elderly, having a heart or lung problem, or having a weak immune system. Cigarette smoking also makes bronchitis more likely.  When bronchitis develops, the airways become swollen. The airways may also become infected with bacteria. This is known as a secondary infection.  Diagnosing acute bronchitis  Your healthcare provider will examine you and ask about your symptoms and health history. You may also have a sputum culture to test the fluid in your lungs. Chest X-rays may be done to look for infection in the lungs.  Treating acute bronchitis  Bronchitis usually clears up as the cold or flu goes away. You can help feel better faster by doing the following:  · Take medicine as directed. You may be told to take ibuprofen or other over-the-counter medicines. These help relieve inflammation in your bronchial tubes. Your healthcare provider may prescribe an inhaler to help open up the bronchial tubes. Most of the time, acute bronchitis is caused by a viral infection. Antibiotics are usually not prescribed for viral infections.  · Drink plenty of fluids, such as water, juice, or warm soup. Fluids loosen mucus so that you can cough it up. This helps you breathe more easily. Fluids also prevent dehydration.  · Make sure you get plenty of rest.  · Do not smoke. Do not allow anyone else to smoke in your home.  Recovery and follow-up  Follow up with your doctor as you are told. You will likely feel better in a week or two. But a dry cough can linger beyond that time. Let your doctor know if you still have symptoms (other than a dry cough) after 2 weeks, or if youre prone to getting bronchial infections. Take steps to protect yourself from future infections. These steps include stopping smoking and avoiding tobacco smoke, washing your hands often, and  getting a yearly flu shot.  When to call your healthcare provider  Call the healthcare provider if you have any of the following:  · Fever of 100.4°F (38.0°C) or higher, or as advised  · Symptoms that get worse, or new symptoms  · Trouble breathing  · Symptoms that dont start to improve within a week, or within 3 days of taking antibiotics   Date Last Reviewed: 12/1/2016  © 7476-3166 [employer]. [address] [place] [address], [place] [address]. All rights reserved. This information is not intended as a substitute for professional medical care. Always follow your healthcare professional's instructions.

## 2019-09-26 ENCOUNTER — TELEPHONE (OUTPATIENT)
Dept: INTERNAL MEDICINE | Facility: CLINIC | Age: 69
End: 2019-09-26

## 2019-09-26 RX ORDER — ALBUTEROL SULFATE 90 UG/1
2 AEROSOL, METERED RESPIRATORY (INHALATION) EVERY 6 HOURS PRN
Qty: 18 G | Refills: 6 | Status: SHIPPED | OUTPATIENT
Start: 2019-09-26 | End: 2022-09-26 | Stop reason: SDUPTHER

## 2019-09-26 NOTE — TELEPHONE ENCOUNTER
----- Message from Kellie Marrero sent at 9/26/2019 12:20 PM CDT -----  Pharmacy called to ask permission to change ventolin rx to pro-air as her ventolin is not covered.    Cecilia Kimbrough [phone]

## 2019-10-01 ENCOUNTER — PATIENT OUTREACH (OUTPATIENT)
Dept: ADMINISTRATIVE | Facility: OTHER | Age: 69
End: 2019-10-01

## 2019-10-01 ENCOUNTER — LAB VISIT (OUTPATIENT)
Dept: LAB | Facility: HOSPITAL | Age: 69
End: 2019-10-01
Attending: NURSE PRACTITIONER
Payer: MEDICARE

## 2019-10-01 DIAGNOSIS — D63.1 ANEMIA OF CHRONIC RENAL FAILURE, STAGE 4 (SEVERE): ICD-10-CM

## 2019-10-01 DIAGNOSIS — N18.4 ANEMIA OF CHRONIC RENAL FAILURE, STAGE 4 (SEVERE): ICD-10-CM

## 2019-10-01 LAB
ALBUMIN SERPL BCP-MCNC: 3.6 G/DL (ref 3.5–5.2)
ALP SERPL-CCNC: 102 U/L (ref 55–135)
ALT SERPL W/O P-5'-P-CCNC: 11 U/L (ref 10–44)
ANION GAP SERPL CALC-SCNC: 8 MMOL/L (ref 8–16)
AST SERPL-CCNC: 18 U/L (ref 10–40)
BILIRUB SERPL-MCNC: 0.2 MG/DL (ref 0.1–1)
BUN SERPL-MCNC: 43 MG/DL (ref 8–23)
CALCIUM SERPL-MCNC: 9.5 MG/DL (ref 8.7–10.5)
CHLORIDE SERPL-SCNC: 104 MMOL/L (ref 95–110)
CO2 SERPL-SCNC: 27 MMOL/L (ref 23–29)
CREAT SERPL-MCNC: 2 MG/DL (ref 0.5–1.4)
ERYTHROCYTE [DISTWIDTH] IN BLOOD BY AUTOMATED COUNT: 15.4 % (ref 11.5–14.5)
EST. GFR  (AFRICAN AMERICAN): 28.7 ML/MIN/1.73 M^2
EST. GFR  (NON AFRICAN AMERICAN): 24.9 ML/MIN/1.73 M^2
FERRITIN SERPL-MCNC: 78 NG/ML (ref 20–300)
GLUCOSE SERPL-MCNC: 241 MG/DL (ref 70–110)
HCT VFR BLD AUTO: 29.8 % (ref 37–48.5)
HGB BLD-MCNC: 9.1 G/DL (ref 12–16)
IMM GRANULOCYTES # BLD AUTO: 0.02 K/UL (ref 0–0.04)
MCH RBC QN AUTO: 27.4 PG (ref 27–31)
MCHC RBC AUTO-ENTMCNC: 30.5 G/DL (ref 32–36)
MCV RBC AUTO: 90 FL (ref 82–98)
NEUTROPHILS # BLD AUTO: 3.9 K/UL (ref 1.8–7.7)
PLATELET # BLD AUTO: 234 K/UL (ref 150–350)
PMV BLD AUTO: 10.2 FL (ref 9.2–12.9)
POTASSIUM SERPL-SCNC: 4.6 MMOL/L (ref 3.5–5.1)
PROT SERPL-MCNC: 6.4 G/DL (ref 6–8.4)
RBC # BLD AUTO: 3.32 M/UL (ref 4–5.4)
SODIUM SERPL-SCNC: 139 MMOL/L (ref 136–145)
WBC # BLD AUTO: 6.55 K/UL (ref 3.9–12.7)

## 2019-10-01 PROCEDURE — 85027 COMPLETE CBC AUTOMATED: CPT

## 2019-10-01 PROCEDURE — 82728 ASSAY OF FERRITIN: CPT

## 2019-10-01 PROCEDURE — 36415 COLL VENOUS BLD VENIPUNCTURE: CPT

## 2019-10-01 PROCEDURE — 80053 COMPREHEN METABOLIC PANEL: CPT

## 2019-10-03 ENCOUNTER — CLINICAL SUPPORT (OUTPATIENT)
Dept: DIABETES | Facility: CLINIC | Age: 69
End: 2019-10-03
Payer: MEDICARE

## 2019-10-03 ENCOUNTER — OFFICE VISIT (OUTPATIENT)
Dept: HEMATOLOGY/ONCOLOGY | Facility: CLINIC | Age: 69
End: 2019-10-03
Payer: MEDICARE

## 2019-10-03 VITALS
OXYGEN SATURATION: 96 % | TEMPERATURE: 98 F | BODY MASS INDEX: 48.82 KG/M2 | WEIGHT: 293 LBS | SYSTOLIC BLOOD PRESSURE: 123 MMHG | RESPIRATION RATE: 16 BRPM | HEART RATE: 78 BPM | DIASTOLIC BLOOD PRESSURE: 58 MMHG | HEIGHT: 65 IN

## 2019-10-03 DIAGNOSIS — E11.22 TYPE 2 DM WITH CKD STAGE 4 AND HYPERTENSION: ICD-10-CM

## 2019-10-03 DIAGNOSIS — I12.9 TYPE 2 DM WITH CKD STAGE 4 AND HYPERTENSION: ICD-10-CM

## 2019-10-03 DIAGNOSIS — N18.4 TYPE 2 DM WITH CKD STAGE 4 AND HYPERTENSION: ICD-10-CM

## 2019-10-03 DIAGNOSIS — E11.9 TYPE 2 DIABETES MELLITUS WITHOUT COMPLICATION, WITHOUT LONG-TERM CURRENT USE OF INSULIN: Primary | ICD-10-CM

## 2019-10-03 DIAGNOSIS — D64.9 ANEMIA, UNSPECIFIED TYPE: Primary | ICD-10-CM

## 2019-10-03 DIAGNOSIS — K59.00 CONSTIPATION, UNSPECIFIED CONSTIPATION TYPE: ICD-10-CM

## 2019-10-03 DIAGNOSIS — E11.9 TYPE 2 DIABETES MELLITUS WITHOUT COMPLICATION, WITH LONG-TERM CURRENT USE OF INSULIN: ICD-10-CM

## 2019-10-03 DIAGNOSIS — Z79.4 TYPE 2 DIABETES MELLITUS WITHOUT COMPLICATION, WITH LONG-TERM CURRENT USE OF INSULIN: ICD-10-CM

## 2019-10-03 PROCEDURE — 99214 OFFICE O/P EST MOD 30 MIN: CPT | Mod: PBBFAC | Performed by: NURSE PRACTITIONER

## 2019-10-03 PROCEDURE — 99999 PR PBB SHADOW E&M-EST. PATIENT-LVL IV: CPT | Mod: PBBFAC,,, | Performed by: NURSE PRACTITIONER

## 2019-10-03 PROCEDURE — 99999 PR PBB SHADOW E&M-EST. PATIENT-LVL II: CPT | Mod: PBBFAC,,,

## 2019-10-03 PROCEDURE — 99214 OFFICE O/P EST MOD 30 MIN: CPT | Mod: S$PBB,,, | Performed by: NURSE PRACTITIONER

## 2019-10-03 PROCEDURE — 99999 PR PBB SHADOW E&M-EST. PATIENT-LVL II: ICD-10-PCS | Mod: PBBFAC,,,

## 2019-10-03 PROCEDURE — G0108 DIAB MANAGE TRN  PER INDIV: HCPCS | Mod: PBBFAC

## 2019-10-03 PROCEDURE — 99212 OFFICE O/P EST SF 10 MIN: CPT | Mod: PBBFAC,27

## 2019-10-03 PROCEDURE — 99214 PR OFFICE/OUTPT VISIT, EST, LEVL IV, 30-39 MIN: ICD-10-PCS | Mod: S$PBB,,, | Performed by: NURSE PRACTITIONER

## 2019-10-03 PROCEDURE — 99999 PR PBB SHADOW E&M-EST. PATIENT-LVL IV: ICD-10-PCS | Mod: PBBFAC,,, | Performed by: NURSE PRACTITIONER

## 2019-10-03 NOTE — TELEPHONE ENCOUNTER
----- Message from Maria G Lopez RN, CDE sent at 10/3/2019  2:19 PM CDT -----  Please send RX for Contour Next test strips checks bg 3 x per day. Please send to  [facility]  [facility] in [employer]    Thanks, Maria G

## 2019-10-03 NOTE — PROGRESS NOTES
Subjective:       Patient ID: Annika Mac is a 68 y.o. female.    Chief Complaint: Follow-up    Anemia        Mrs. Mac is a 69 yo obese female who presents today in follow up for her longstanding anemia.      Multiple stool samples in the past, as well as the ones turned in today were negative for occult blood.    Review of Systems  Overall she feels ok. She has longstanding arthritic pains which are no worse. She had some constipation this week. Taking codeine for a lingering cough due to bronchitis. Cough is improving. She denies any anxiety, depression, easy bruising, fevers, chills, night sweats, weight loss, nausea, vomiting, diarrhea, diplopia, blurred vision, epistaxis, hematuria, or headaches.      Objective:      Physical Exam  GENERAL: She is alert, oriented to time, place, person, pleasant, well nourished, in no acute physical distress. Presents in WC.   VITAL SIGNS: Reviewed.   HEENT: Normal. There are no nasal, oral, lip, gingival, auricular, lid, conjunctival lesions. Pupils are equal, reactive to light and   accommodation and extraocular muscle movements are intact. Mucosae are moist and pink, and there is no thrush.   NECK: Supple without JVD, adenopathy, or thyromegaly.   LUNGS: Clear to auscultation without wheezing, rales, or rhonchi.   CARDIOVASCULAR: Reveals an S1, S2, no murmurs, no rubs, no gallops.   ABDOMEN: Obese, soft, nontender, without organomegaly. Bowel sounds are present. A median abdominal scar is seen extending from the umbilicus to the symphysis pubis.   EXTREMITIES: No cyanosis or clubbing. There is 1(+) edema at the ankle level bilaterally.   SKIN: Does not have petechiae, rashes, induration, or ecchymoses.   NEUROLOGIC: Motor function is 5/5, DTRs are 0 to 1+ bilaterally, symmetrical, and cranial nerves within normal limits.   LYMPHATIC: There is no cervical, axillary, or supraclavicular adenopathy.     Results for orders placed or performed in visit on 10/01/19    Comprehensive metabolic panel   Result Value Ref Range    Sodium 139 136 - 145 mmol/L    Potassium 4.6 3.5 - 5.1 mmol/L    Chloride 104 95 - 110 mmol/L    CO2 27 23 - 29 mmol/L    Glucose 241 (H) 70 - 110 mg/dL    BUN, Bld 43 (H) 8 - 23 mg/dL    Creatinine 2.0 (H) 0.5 - 1.4 mg/dL    Calcium 9.5 8.7 - 10.5 mg/dL    Total Protein 6.4 6.0 - 8.4 g/dL    Albumin 3.6 3.5 - 5.2 g/dL    Total Bilirubin 0.2 0.1 - 1.0 mg/dL    Alkaline Phosphatase 102 55 - 135 U/L    AST 18 10 - 40 U/L    ALT 11 10 - 44 U/L    Anion Gap 8 8 - 16 mmol/L    eGFR if African American 28.7 (A) >60 mL/min/1.73 m^2    eGFR if non African American 24.9 (A) >60 mL/min/1.73 m^2   CBC Oncology   Result Value Ref Range    WBC 6.55 3.90 - 12.70 K/uL    RBC 3.32 (L) 4.00 - 5.40 M/uL    Hemoglobin 9.1 (L) 12.0 - 16.0 g/dL    Hematocrit 29.8 (L) 37.0 - 48.5 %    Mean Corpuscular Volume 90 82 - 98 fL    Mean Corpuscular Hemoglobin 27.4 27.0 - 31.0 pg    Mean Corpuscular Hemoglobin Conc 30.5 (L) 32.0 - 36.0 g/dL    RDW 15.4 (H) 11.5 - 14.5 %    Platelets 234 150 - 350 K/uL    MPV 10.2 9.2 - 12.9 fL    Gran # (ANC) 3.9 1.8 - 7.7 K/uL    Immature Grans (Abs) 0.02 0.00 - 0.04 K/uL   Ferritin   Result Value Ref Range    Ferritin 78 20.0 - 300.0 ng/mL     *Note: Due to a large number of results and/or encounters for the requested time period, some results have not been displayed. A complete set of results can be found in Results Review.       Assessment:       1. Anemia, unspecified type     2. 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 Constipation, unspecified constipation type           Plan:         Doing well, clinically stable.   Labs reviewed. Anemia parameters stable.   RTC in 6 months to see Dr. Kwan. She will submit 3 stool samples at that time.   Miralax for constipation  Given the chronicity of her anemia, I am not inclined to work her up further and perform a bone marrow biopsy unless her anemia gets worse.  Is uspect that her CKD contributes  to her anemia.  Her questions were answered to her satisfaction.       Patient is in agreement with the proposed treatment plan. All questions were answered to the patient's satisfaction. Pt knows to call clinic for any new or worsening symptoms and if anything is needed before the next clinic visit.      Juan A Garcia, DONTRELLP-C  Hematology & Oncology  [facility] [place] [address]  ph. [phone]  Fax. [phone]     I spent 30 minutes (face to face) with the patient, more than 50% was in counseling and coordination of care as detailed above.

## 2019-10-04 NOTE — PROGRESS NOTES
Diabetes Education  Author: Maria G Lopez RN, CDE  Date: 10/3/2019    Diabetes Care Management Summary  Diabetes Education Record Assessment/Progress: Post Program/Follow-up  Current Diabetes Risk Level: Moderate     Last A1c:   Lab Results   Component Value Date    HGBA1C 8.8 (H) 08/05/2019     Last visit with Diabetes Educator: : 09/12/2019      Health Maintenance was reviewed today with patient. Discussed with patient importance of routine eye exams, foot exams/foot care, blood work (i.e.: A1c, microalbumin, and lipid), dental visits, yearly flu vaccine, and pneumonia vaccine as indicated by PCP. Patient verbalized understanding.     Health Maintenance Topics with due status: Not Due       Topic Last Completion Date    DEXA SCAN 06/09/2017    Colonoscopy 02/26/2018    TETANUS VACCINE 08/24/2018    Lipid Panel 03/26/2019    Foot Exam 06/13/2019    Hemoglobin A1c 08/05/2019    High Dose Statin 10/03/2019     Health Maintenance Due   Topic Date Due    Mammogram  10/25/2019    Eye Exam  10/30/2019          The patient and her spouse are in clinic today for Dexcom G5 follow up. Her spouse is inserting the G5 every week.  Reviewed how to use the transmitter latch to attach transmitter to pod.  Dexcom download was reviewed for the last 7 days.  Her numbers appeared WNL for the exception of a couple of lows noted in the early am.  It appeared that she may have dipped into the 70's at that time. The last two days she has remained elevated in the 200 range.  Also she has been doing multiple calibrations during this time.  Explained that only 2 calibrations per day are needed.  After doing food summary for the last 2 days, she had eaten fast foods including regular soft drink.  This could explain the excursions seen. Her insulin doses are as follows: 70/30 105 units at 9:30 am; 85 units at 1:30 pm and 65 units at 6 pm.      Reviewed signs/symptoms of hypoglycemia and treatment following rule of 15 gram/recheck in 15  mins.    Reviewed carbohydrate limits for each meal.  Stressed the importance of avoiding all sugary types food or drink.     Note send to HCP for testing supplies refill.    Today's Self-Management Care Plan was developed with the patient's input and is based on barriers identified during today's assessment.    The long and short-term goals in the care plan were written with the patient/caregiver's input. The patient has agreed to work toward these goals to improve her overall diabetes control.      The patient received a copy of today's self-management plan and verbalized understanding of the care plan, goals, and all of today's instructions.      The patient was encouraged to communicate with her physician and care team regarding her condition(s) and treatment.  I provided the patient with my contact information today and encouraged her to contact me via phone or patient portal as needed.     Education time: 60 mins

## 2019-10-15 ENCOUNTER — TELEPHONE (OUTPATIENT)
Dept: CARDIOLOGY | Facility: HOSPITAL | Age: 69
End: 2019-10-15

## 2019-10-22 ENCOUNTER — CLINICAL SUPPORT (OUTPATIENT)
Dept: URGENT CARE | Facility: CLINIC | Age: 69
End: 2019-10-22
Payer: MEDICARE

## 2019-10-22 DIAGNOSIS — Z23 FLU VACCINE NEED: Primary | ICD-10-CM

## 2019-10-22 PROCEDURE — G0008 ADMIN INFLUENZA VIRUS VAC: HCPCS | Mod: S$GLB,,, | Performed by: INTERNAL MEDICINE

## 2019-10-22 PROCEDURE — 90662 FLU VACCINE - HIGH DOSE (65+) PRESERVATIVE FREE IM: ICD-10-PCS | Mod: S$GLB,,, | Performed by: INTERNAL MEDICINE

## 2019-10-22 PROCEDURE — G0008 FLU VACCINE - HIGH DOSE (65+) PRESERVATIVE FREE IM: ICD-10-PCS | Mod: S$GLB,,, | Performed by: INTERNAL MEDICINE

## 2019-10-22 PROCEDURE — 90662 IIV NO PRSV INCREASED AG IM: CPT | Mod: S$GLB,,, | Performed by: INTERNAL MEDICINE

## 2019-10-25 ENCOUNTER — TELEPHONE (OUTPATIENT)
Dept: OPHTHALMOLOGY | Facility: CLINIC | Age: 69
End: 2019-10-25

## 2019-10-28 ENCOUNTER — PATIENT OUTREACH (OUTPATIENT)
Dept: ADMINISTRATIVE | Facility: OTHER | Age: 69
End: 2019-10-28

## 2019-10-30 ENCOUNTER — OFFICE VISIT (OUTPATIENT)
Dept: PHYSICAL MEDICINE AND REHAB | Facility: CLINIC | Age: 69
End: 2019-10-30
Payer: MEDICARE

## 2019-10-30 ENCOUNTER — HOSPITAL ENCOUNTER (OUTPATIENT)
Dept: RADIOLOGY | Facility: HOSPITAL | Age: 69
Discharge: HOME | End: 2019-10-30
Attending: INTERNAL MEDICINE
Payer: MEDICARE

## 2019-10-30 VITALS
SYSTOLIC BLOOD PRESSURE: 122 MMHG | WEIGHT: 293 LBS | HEART RATE: 68 BPM | DIASTOLIC BLOOD PRESSURE: 66 MMHG | HEIGHT: 65 IN | BODY MASS INDEX: 48.82 KG/M2

## 2019-10-30 DIAGNOSIS — G63 POLYNEUROPATHY ASSOCIATED WITH UNDERLYING DISEASE: ICD-10-CM

## 2019-10-30 DIAGNOSIS — G89.29 CHRONIC LOW BACK PAIN WITH SCIATICA, SCIATICA LATERALITY UNSPECIFIED, UNSPECIFIED BACK PAIN LATERALITY: Primary | ICD-10-CM

## 2019-10-30 DIAGNOSIS — E66.01 CLASS 3 SEVERE OBESITY DUE TO EXCESS CALORIES WITH SERIOUS COMORBIDITY AND BODY MASS INDEX (BMI) OF 50.0 TO 59.9 IN ADULT: ICD-10-CM

## 2019-10-30 DIAGNOSIS — M54.40 CHRONIC LOW BACK PAIN WITH SCIATICA, SCIATICA LATERALITY UNSPECIFIED, UNSPECIFIED BACK PAIN LATERALITY: Primary | ICD-10-CM

## 2019-10-30 DIAGNOSIS — Z79.4 TYPE 2 DIABETES MELLITUS WITH HYPERGLYCEMIA, WITH LONG-TERM CURRENT USE OF INSULIN: Primary | ICD-10-CM

## 2019-10-30 DIAGNOSIS — M17.11 PRIMARY OSTEOARTHRITIS OF RIGHT KNEE: ICD-10-CM

## 2019-10-30 DIAGNOSIS — M48.061 SPINAL STENOSIS OF LUMBAR REGION, UNSPECIFIED WHETHER NEUROGENIC CLAUDICATION PRESENT: ICD-10-CM

## 2019-10-30 DIAGNOSIS — E11.43 DIABETIC AUTONOMIC NEUROPATHY ASSOCIATED WITH TYPE 2 DIABETES MELLITUS: ICD-10-CM

## 2019-10-30 DIAGNOSIS — E11.65 TYPE 2 DIABETES MELLITUS WITH HYPERGLYCEMIA, WITH LONG-TERM CURRENT USE OF INSULIN: Primary | ICD-10-CM

## 2019-10-30 DIAGNOSIS — Z12.31 ENCOUNTER FOR SCREENING MAMMOGRAM FOR BREAST CANCER: ICD-10-CM

## 2019-10-30 DIAGNOSIS — Z86.73 HISTORY OF STROKE: ICD-10-CM

## 2019-10-30 DIAGNOSIS — Z79.891 OPIOID USE AGREEMENT EXISTS: ICD-10-CM

## 2019-10-30 DIAGNOSIS — R26.81 GAIT INSTABILITY: ICD-10-CM

## 2019-10-30 DIAGNOSIS — M48.062 SPINAL STENOSIS OF LUMBAR REGION WITH NEUROGENIC CLAUDICATION: ICD-10-CM

## 2019-10-30 DIAGNOSIS — M47.816 LUMBAR FACET ARTHROPATHY: ICD-10-CM
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
## 2019-10-30 PROCEDURE — 77067 SCR MAMMO BI INCL CAD: CPT | Mod: TC

## 2019-10-30 PROCEDURE — 77067 MAMMO DIGITAL SCREENING BILAT WITH TOMOSYNTHESIS_CAD: ICD-10-PCS | Mod: 26,,, | Performed by: RADIOLOGY

## 2019-10-30 PROCEDURE — 99214 PR OFFICE/OUTPT VISIT, EST, LEVL IV, 30-39 MIN: ICD-10-PCS | Mod: S$PBB,,, | Performed by: PHYSICAL MEDICINE & REHABILITATION

## 2019-10-30 PROCEDURE — 99213 OFFICE O/P EST LOW 20 MIN: CPT | Mod: PBBFAC | Performed by: PHYSICAL MEDICINE & REHABILITATION

## 2019-10-30 PROCEDURE — 99999 PR PBB SHADOW E&M-EST. PATIENT-LVL III: CPT | Mod: PBBFAC,,, | Performed by: PHYSICAL MEDICINE & REHABILITATION

## 2019-10-30 PROCEDURE — 77063 BREAST TOMOSYNTHESIS BI: CPT | Mod: 26,,, | Performed by: RADIOLOGY

## 2019-10-30 PROCEDURE — 99999 PR PBB SHADOW E&M-EST. PATIENT-LVL III: ICD-10-PCS | Mod: PBBFAC,,, | Performed by: PHYSICAL MEDICINE & REHABILITATION

## 2019-10-30 PROCEDURE — 99214 OFFICE O/P EST MOD 30 MIN: CPT | Mod: S$PBB,,, | Performed by: PHYSICAL MEDICINE & REHABILITATION

## 2019-10-30 PROCEDURE — 77067 SCR MAMMO BI INCL CAD: CPT | Mod: 26,,, | Performed by: RADIOLOGY

## 2019-10-30 PROCEDURE — 77063 MAMMO DIGITAL SCREENING BILAT WITH TOMOSYNTHESIS_CAD: ICD-10-PCS | Mod: 26,,, | Performed by: RADIOLOGY

## 2019-10-30 RX ORDER — TRAMADOL HYDROCHLORIDE 50 MG/1
50 TABLET ORAL EVERY 8 HOURS PRN
Qty: 90 TABLET | Refills: 3 | Status: SHIPPED | OUTPATIENT
Start: 2019-11-11 | End: 2021-09-13

## 2019-10-30 NOTE — PROGRESS NOTES
"Subjective:       Patient ID: Annika Mac is a 68 y.o. female.    Chief Complaint: Back Pain and Leg Pain    Patient is 69 y/o female , who returns to clinic for chronic back and leg pain.   LCV  07/03/19.     Today, main pains are:    #1 back pain, leg pain,   #2 Left shoulder pain ( Ortho f/u, and pt is going to PT for shoulder).     Current back pain is  3,  worst pain is 7/10, the best is 3-4 with meds  #1 back pain, leg pain,   Current back pain is 0,  worst pain are7/10, best is 3-4.  Pain is present at all time, accross lower back in midline of tail bone.   Back pain is more dull pain, and leg pain is more shooting pain art the top of thighs, and bellow the knee.   Pain is present daytime, and radiates to both legs on front of legs.  Cannot walk more than 50 ft nor she can stand straight > 5 minutes.   She has to lean forward even with cane or during walking, needs to sit down to rest.  Back pain increases, and leg become weak.   She has diabetic neuropathy in both feet, top and bottom, tingling pain.  Does not have "charilie horses".   Sitting down helps a little bit, and lying down  ( on side, or stomach).   She failed Neurontin 300 mg  for maybe 6 yrs, and takes 3 pills a day, that does not help much.   And , Gabapentin is eventually changed to Lyrica 100 mg that she takes daily (approved by insurance) , and pain in hands is gone. Tolerates Lyrica well.   She has been getting NAYLA since 2011,by Dr. Quinn and Geronimo with NAYLA, with some pain relief.   She had a second B/l L5/S1 TF NAYLA on 08/18/16, and she reported that helped a lot > 80% and lasted for only 2 weeks.   First  B/l L5/S1 TF NAYLA 5/11/16, with  > 50% pain improvement.  On LCV, she was given Hydrocodone , and that in combination with Gabapentin, helped greatly, decreases pain to tolerable 2-3.  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   She is using for gait: SC, manual WC, and RW.  Here for " follow up, and treatment.     Past Medical History:   Diagnosis Date    Allergy     Anemia 2012    Arthritis     CHF (congestive heart failure)     CKD (chronic kidney disease) stage 3, GFR 30-59 ml/min 2012    Clotting disorder     Colon polyp     Deep vein thrombophlebitis of left leg 2012    Degenerative disc disease     Diabetic peripheral neuropathy associated with type 2 diabetes mellitus 2012    GERD (gastroesophageal reflux disease)     HTN (hypertension) 2012    Hyperlipidemia 2012    Lead-induced gout of ankle or foot     right going on three weeks    Nonproliferative diabetic retinopathy of right eye 2012    Obstructive sleep apnea 2012    Osteoporosis     Proliferative diabetic retinopathy of left eye 2012    Stroke 2003    Type 2 diabetes mellitus with diabetic polyneuropathy 2012    Ulcer        Past Surgical History:   Procedure Laterality Date    CATARACT EXTRACTION W/  INTRAOCULAR LENS IMPLANT Left 3/2002    left     CATARACT EXTRACTION W/  INTRAOCULAR LENS IMPLANT Right     OD dr. olivares     SECTION      COLONOSCOPY N/A 2018    Procedure: COLONOSCOPY;  Surgeon: Faizan Mac MD;  Location: [facility] ([address] [place] [address]);  Service: Endoscopy;  Laterality: N/A;    CYST REMOVAL  2011    left side of face    EYE SURGERY Bilateral 2012    eye implants    GANGLION CYST EXCISION  2007    Right wrist    Pan Retinal Photocoagulation Bilateral     Dr. Monterroso (Proliferative Diabetic Retinopathy)    TOTAL ABDOMINAL HYSTERECTOMY  1982    TOTAL KNEE ARTHROPLASTY  2006    left    TRIGGER FINGER RELEASE  2009       Family History   Problem Relation Age of Onset    Diabetes Mother     Hypertension Mother     Heart disease Father     Diabetes Sister     Thyroid disease Brother     Diabetes Brother     Hypertension Brother     No Known Problems Daughter     No Known Problems Son     No  "Known Problems Daughter     Amblyopia Neg Hx     Blindness Neg Hx     Glaucoma Neg Hx     Macular degeneration Neg Hx     Retinal detachment Neg Hx     Strabismus Neg Hx     Colon cancer Neg Hx     Esophageal cancer Neg Hx        Social History     Socioeconomic History    Marital status:      Spouse name: Heber    Number of children: 3    Years of education: Not on file    Highest education level: Not on file   Occupational History    Not on file   Social Needs    Financial resource strain: Not on file    Food insecurity:     Worry: Not on file     Inability: Not on file    Transportation needs:     Medical: Not on file     Non-medical: Not on file   Tobacco Use    Smoking status: Never Smoker    Smokeless tobacco: Never Used   Substance and Sexual Activity    Alcohol use: No    Drug use: No    Sexual activity: Yes     Partners: Male   Lifestyle    Physical activity:     Days per week: Not on file     Minutes per session: Not on file    Stress: Not on file   Relationships    Social connections:     Talks on phone: Not on file     Gets together: Not on file     Attends [employer] service: Not on file     Active member of club or organization: Not on file     Attends meetings of clubs or organizations: Not on file     Relationship status: Not on file   Other Topics Concern    Not on file   Social History Narrative    , lives with family; 3 children, 2 grandchildren       Current Outpatient Medications   Medication Sig Dispense Refill    albuterol (PROAIR HFA) 90 mcg/actuation inhaler Inhale 2 puffs into the lungs every 6 (six) hours as needed for Wheezing. Rescue 18 g 6    allopurinol (ZYLOPRIM) 300 MG tablet Take 300 mg by mouth once daily.       amLODIPine (NORVASC) 10 MG tablet Take 1 tablet (10 mg total) by mouth once daily. 90 tablet 3    aspirin 81 MG Chew Take 1 tablet (81 mg total) by mouth once daily.  0    BD ULTRA-FINE KIKA PEN NEEDLE 32 gauge x 5/32" Ndle To use " 4 times daily 200 each 20    blood sugar diagnostic Strp 1 each by Misc.(Non-Drug; Combo Route) route 3 (three) times daily. Dx code  E11.42 . Contour Next 120 each 12    blood-glucose meter kit Use as instructed, true test/result meter 1 each 0    blood-glucose meter kit 1 each by Other route once daily. Use daily. Insurance proffered one touch  E11.42 1 each 0    chlorthalidone (HYGROTEN) 25 MG Tab Take 1 tablet (25 mg total) by mouth once daily. 30 tablet 11    colchicine (COLCRYS) 0.6 mg tablet Take 0.6 mg by mouth as needed.      DULoxetine (CYMBALTA) 30 MG capsule Take 1 capsule (30 mg total) by mouth once daily. 90 capsule 3    famotidine (PEPCID) 20 MG tablet Take 1 tablet (20 mg total) by mouth 2 (two) times daily. 1 tablet 0    fexofenadine (ALLEGRA) 180 MG tablet TAKE 1 TABLET BY MOUTH ONCE DAILY 30 tablet 0    finasteride (PROSCAR) 5 mg tablet TAKE 1 TABLET(5 MG) BY MOUTH EVERY DAY 90 tablet 1    fluocinonide (LIDEX) 0.05 % external solution AAA scalp qday - bid prn pruritus 60 mL 3    glucagon, human recombinant, (GLUCAGON EMERGENCY KIT, HUMAN,) 1 mg SolR Inject 1 mg into the muscle as needed. 1 each 3    insulin NPH-insulin regular, 70/30, (NOVOLIN 70/30 U-100 INSULIN) 100 unit/mL (70-30) injection INJECT 120 UNITS INTO THE SKIN WITH BREAKFAST, 110 UNITS WITH LUNCH, AND INJECT 90 UNITS WITH DINNER ;  300 mL 3    insulin NPH-insulin regular, 70/30, (NOVOLIN 70/30) 100 unit/mL (70-30) injection Breakfast: 100 -110 units Lunch: 80 - 90 units Dinner: 65 - 70 units MAX TDD: 270 4 vial 11    insulin regular 100 unit/mL Inj injection Inject 120 Units into the skin 3 (three) times daily before meals. 120 units with breakfast, 110 units with lunch, and 90 units with dinner;       irbesartan (AVAPRO) 300 MG tablet Take 1 tablet (300 mg total) by mouth every evening. 90 tablet 3    ketoconazole (NIZORAL) 2 % shampoo Wash hair with medicated shampoo at least 2x/week - let sit on scalp  at least 5 minutes prior to rinsing 120 mL 5    labetalol (NORMODYNE) 300 MG tablet Take 1 tablet (300 mg total) by mouth 2 (two) times daily. 180 tablet 3    lancets 31 gauge Misc 1 lancet by Misc.(Non-Drug; Combo Route) route 4 (four) times daily. E11.42 . Prescribed one touch 200 each 6    lancets 33 gauge Misc 1 lancet by Misc.(Non-Drug; Combo Route) route 4 (four) times daily. Dx code E11.42 200 each 12    multivitamin (THERAGRAN) per tablet Take 1 tablet by mouth once daily.      pregabalin (LYRICA) 150 MG capsule Take 1 capsule (150 mg total) by mouth 2 (two) times daily. 60 capsule 5    promethazine (PHENERGAN) 6.25 mg/5 mL syrup Take 5 mLs (6.25 mg total) by mouth every 8 (eight) hours as needed for Nausea. 118 mL 0    simvastatin (ZOCOR) 40 MG tablet Take 1 tablet (40 mg total) by mouth every evening. 90 tablet 4    [START ON 11/11/2019] traMADol (ULTRAM) 50 mg tablet Take 1 tablet (50 mg total) by mouth every 8 (eight) hours as needed. 90 tablet 3    triamcinolone acetonide 0.1% (KENALOG) 0.1 % ointment AAA bid hands 60 g 1     No current facility-administered medications for this visit.        Review of patient's allergies indicates:   Allergen Reactions    Iodinated contrast media - oral and iv dye Rash     Review of Systems   Constitutional: Negative.  Negative for appetite change, chills, fatigue, fever and unexpected weight change.   HENT: Negative.  Negative for drooling, trouble swallowing and voice change.    Eyes: Negative.  Negative for pain and visual disturbance.   Respiratory: Negative.  Negative for shortness of breath and wheezing.    Cardiovascular: Negative.  Negative for chest pain and palpitations.   Gastrointestinal: Negative.  Negative for abdominal distention, abdominal pain, constipation and diarrhea.   Genitourinary: Negative.  Negative for difficulty urinating.   Musculoskeletal: Positive for back pain. Negative for arthralgias, gait problem, joint swelling, myalgias and  neck stiffness.               Skin: Negative.  Negative for color change and rash.   Neurological: Negative.  Negative for dizziness, facial asymmetry, speech difficulty, weakness, light-headedness and numbness. Headaches:     Hematological: Negative for adenopathy.   Psychiatric/Behavioral: Negative.  Negative for behavioral problems, confusion and sleep disturbance. The patient is not nervous/anxious.          Objective:      Physical Exam    GENERAL: The patient is alert, oriented, pleasant.   HEENT; PERRLA  NECK: supple   MUSCULOSKELETAL:   Gait: slow james , on wide basis, using RW.   Cervical spine :  Minimally decreased AROM in cervical spine, flexion to 60, extension to  0, side bending and rotation  to 30-35 degrees without pain.  No paravertebral cervical musculature tenderness    No  tenderness in upper trapezius muscles    Lumbar spine, full range of motion in all planes, flexion to 90 degrees , ext. 0.  Side bending and rotation to 35-40 degrees.   Straight leg raising Negative bilaterally.   Full range of motion in all joints x4 extremities, except in RT knee, that is very painful at pattellar lower pole/border.  Appears high riding patella, hat is maybe  lateray displaced,  Muscle strength 5/5 throughout x4 extremities.   No  joint laxity throughout x4 extremities.   NEUROLOGIC: Cranial nerves II through XII intact.   Deep tendon reflexes is normal, +2 in the upper and lower extremities bilaterally.   Muscle tone is normal.   Sensory is intact to light touch and pinprick throughout x4 extremities.     MRI of Lumbar spine showed:  T12-L1:  There is no focal disc herniation.  No significant spinal canal narrowing.    No significant neural foraminal narrowing.  L1-2:  There is no focal disc herniation.  No significant spinal canal narrowing.    No significant neural foraminal narrowing.  L2-3:  There is no focal disc herniation.  No significant spinal canal narrowing.    No significant neural foraminal  narrowing.  L3-4:  There is a minimal circumferential disk bulge and mild bilateral facet arthropathy which results in no significant spinal canal or neural foraminal narrowing.  L4-5:    There is circumferential disk bulge (eccentric to the left), moderate bilateral hypertrophic facet arthropathy, and ligamentum flavum thickening which results in mild spinal canal narrowing and no significant neural foraminal narrowing.      L5-S1:  There is circumferential disk bulge and severe hypertrophic facet arthropathy   with ligamentum flavum hypertrophy which results in mild spinal canal narrowing, moderate left neural foraminal narrowing, and mild right neural foraminal narrowing.i  Impression:   multilevel degenerative changes of the lumbar spine consisting of circumferential disk bulges, hypertrophic facet arthropathy, and ligamentum flavum hypertrophy   which result in mild spinal canal narrowing at L4-5  and L5-S1 as well as moderate left and mild right neural foraminal narrowing at L5-S1.      Assessment:       1. Chronic low back pain with sciatica, sciatica laterality unspecified, unspecified back pain laterality    2. Spinal stenosis of lumbar region with neurogenic claudication    3. Lumbar facet arthropathy    4. Diabetic autonomic neuropathy associated with type 2 diabetes mellitus    5. Gait instability    6. Fall (on)(from) incline, sequela    7. Polyneuropathy associated with underlying disease    8. Opioid use agreement exists    9. Primary osteoarthritis of right knee    10. History of stroke    11. Class 3 severe obesity due to excess calories with serious comorbidity and body mass index (BMI) of 50.0 to 59.9 in adult    12. Spinal stenosis of lumbar region, unspecified whether neurogenic claudication present      Plan:       Chronic low back pain with sciatica, sciatica laterality unspecified, unspecified back pain laterality  -     traMADol (ULTRAM) 50 mg tablet; Take 1 tablet (50 mg total) by mouth  every 8 (eight) hours as needed.  Dispense: 90 tablet; Refill: 3    Spinal stenosis of lumbar region with neurogenic claudication  -     traMADol (ULTRAM) 50 mg tablet; Take 1 tablet (50 mg total) by mouth every 8 (eight) hours as needed.  Dispense: 90 tablet; Refill: 3    Lumbar facet arthropathy  -     traMADol (ULTRAM) 50 mg tablet; Take 1 tablet (50 mg total) by mouth every 8 (eight) hours as needed.  Dispense: 90 tablet; Refill: 3    Diabetic autonomic neuropathy associated with type 2 diabetes mellitus  -     traMADol (ULTRAM) 50 mg tablet; Take 1 tablet (50 mg total) by mouth every 8 (eight) hours as needed.  Dispense: 90 tablet; Refill: 3    Gait instability  -     traMADol (ULTRAM) 50 mg tablet; Take 1 tablet (50 mg total) by mouth every 8 (eight) hours as needed.  Dispense: 90 tablet; Refill: 3    Fall (on)(from) incline, sequela  -     traMADol (ULTRAM) 50 mg tablet; Take 1 tablet (50 mg total) by mouth every 8 (eight) hours as needed.  Dispense: 90 tablet; Refill: 3    Polyneuropathy associated with underlying disease  -     traMADol (ULTRAM) 50 mg tablet; Take 1 tablet (50 mg total) by mouth every 8 (eight) hours as needed.  Dispense: 90 tablet; Refill: 3    Opioid use agreement exists  -     traMADol (ULTRAM) 50 mg tablet; Take 1 tablet (50 mg total) by mouth every 8 (eight) hours as needed.  Dispense: 90 tablet; Refill: 3    Primary osteoarthritis of right knee    History of stroke    Class 3 severe obesity due to excess calories with serious comorbidity and body mass index (BMI) of 50.0 to 59.9 in adult    Spinal stenosis of lumbar region, unspecified whether neurogenic claudication present  -     traMADol (ULTRAM) 50 mg tablet; Take 1 tablet (50 mg total) by mouth every 8 (eight) hours as needed.  Dispense: 90 tablet; Refill: 3      Patient with chronic low back pain, secondary to lumbar spondylosis, possible radiculopathy, cx with morbid obesity.   Also with gait instability secondary to severe DJD  of Rt knee.     1. Pain management.  Will resume  Hydrocodone to 10/325 mg, anngd Gabapentin is changed to Lyrica, that is helping.   Taking Lyrica 100 mg bid.  ( approved by insurance, and pain in hands is gone),   Increased  Cymbalta to 30 mg bid.    Opioid Risk Score     None         reviewed and appropriate.  Hydrocodone refills on 12/24, 11/20, 10/11/18.  Tramadol 50 mg , refills 10/12, 9/01, 7/9,   Lyrica 150 mg bid, refills (#180 tabs/3 mo), refills on 10/14, 9/10, 8/12, 7/12.   UDS ordered on 10/11/18.        RTC in 4 months.    Total time spent face to face with patient was 25 minutes.   More than 50% of that time was spent in counseling on diagnosis , prognosis and treatment options.   I also  patient  on common and most usual side effect of prescribed medications.   Risk and benefits of opiates, possible risk of developing opiate dependence and tolerance, need of strict compliance with prescribed medications.  Pain contract, rules and obligations were discussed with patient in details.  She is aware that I would be the only doctor prescribing her pain medications and ED in a case of emergency.  I reviewed Primary care , and other specialty's notes to better coordinate patient's  care.   All questions were answered, and patient voiced understanding.                                                                                                                                                                        .

## 2019-11-01 ENCOUNTER — PATIENT OUTREACH (OUTPATIENT)
Dept: DIABETES | Facility: CLINIC | Age: 69
End: 2019-11-01

## 2019-11-04 ENCOUNTER — TELEPHONE (OUTPATIENT)
Dept: ENDOCRINOLOGY | Facility: CLINIC | Age: 69
End: 2019-11-04

## 2019-11-04 NOTE — TELEPHONE ENCOUNTER
----- Message from Carole Lopez sent at 11/4/2019 10:56 AM CST -----  Contact:   Pt  [phone]   Pt  Has papers from a diabetes shoe store , information from  the Dr is needed , give the pt a call back to verify

## 2019-11-04 NOTE — TELEPHONE ENCOUNTER
Called pt and advise that once we do received paper work form [facility], will fill out an fax back to them,

## 2019-11-08 ENCOUNTER — TELEPHONE (OUTPATIENT)
Dept: ENDOCRINOLOGY | Facility: CLINIC | Age: 69
End: 2019-11-08

## 2019-11-11 ENCOUNTER — TELEPHONE (OUTPATIENT)
Dept: ENDOCRINOLOGY | Facility: CLINIC | Age: 69
End: 2019-11-11

## 2019-11-11 DIAGNOSIS — N18.4 CHRONIC KIDNEY DISEASE, STAGE IV (SEVERE): Primary | ICD-10-CM

## 2019-11-11 NOTE — TELEPHONE ENCOUNTER
----- Message from Danielle Cohen sent at 11/11/2019  9:50 AM CST -----  Contact: Self  Pt is needing a call back regarding paper work that was sent to the provider @ [phone]

## 2019-11-13 ENCOUNTER — LAB VISIT (OUTPATIENT)
Dept: LAB | Facility: HOSPITAL | Age: 69
End: 2019-11-13
Attending: NURSE PRACTITIONER
Payer: MEDICARE

## 2019-11-13 DIAGNOSIS — N18.4 TYPE 2 DM WITH CKD STAGE 4 AND HYPERTENSION: ICD-10-CM

## 2019-11-13 DIAGNOSIS — E78.2 MIXED HYPERLIPIDEMIA: ICD-10-CM

## 2019-11-13 DIAGNOSIS — N18.4 CHRONIC KIDNEY DISEASE, STAGE IV (SEVERE): ICD-10-CM

## 2019-11-13 DIAGNOSIS — I12.9 TYPE 2 DM WITH CKD STAGE 4 AND HYPERTENSION: ICD-10-CM

## 2019-11-13 DIAGNOSIS — E11.22 TYPE 2 DM WITH CKD STAGE 4 AND HYPERTENSION: ICD-10-CM

## 2019-11-13 LAB
ALBUMIN SERPL BCP-MCNC: 3.7 G/DL (ref 3.5–5.2)
ALP SERPL-CCNC: 147 U/L (ref 55–135)
ALT SERPL W/O P-5'-P-CCNC: 20 U/L (ref 10–44)
ANION GAP SERPL CALC-SCNC: 12 MMOL/L (ref 8–16)
ANION GAP SERPL CALC-SCNC: 12 MMOL/L (ref 8–16)
AST SERPL-CCNC: 27 U/L (ref 10–40)
BASOPHILS # BLD AUTO: 0.05 K/UL (ref 0–0.2)
BASOPHILS NFR BLD: 0.7 % (ref 0–1.9)
BILIRUB SERPL-MCNC: 0.2 MG/DL (ref 0.1–1)
BUN SERPL-MCNC: 41 MG/DL (ref 8–23)
BUN SERPL-MCNC: 41 MG/DL (ref 8–23)
CALCIUM SERPL-MCNC: 10 MG/DL (ref 8.7–10.5)
CALCIUM SERPL-MCNC: 10 MG/DL (ref 8.7–10.5)
CHLORIDE SERPL-SCNC: 107 MMOL/L (ref 95–110)
CHLORIDE SERPL-SCNC: 107 MMOL/L (ref 95–110)
CHOLEST SERPL-MCNC: 143 MG/DL (ref 120–199)
CHOLEST/HDLC SERPL: 3.3 {RATIO} (ref 2–5)
CO2 SERPL-SCNC: 22 MMOL/L (ref 23–29)
CO2 SERPL-SCNC: 22 MMOL/L (ref 23–29)
CREAT SERPL-MCNC: 1.8 MG/DL (ref 0.5–1.4)
CREAT SERPL-MCNC: 1.8 MG/DL (ref 0.5–1.4)
DIFFERENTIAL METHOD: ABNORMAL
EOSINOPHIL # BLD AUTO: 0.5 K/UL (ref 0–0.5)
EOSINOPHIL NFR BLD: 6.1 % (ref 0–8)
ERYTHROCYTE [DISTWIDTH] IN BLOOD BY AUTOMATED COUNT: 16 % (ref 11.5–14.5)
EST. GFR  (AFRICAN AMERICAN): 32.6 ML/MIN/1.73 M^2
EST. GFR  (AFRICAN AMERICAN): 32.6 ML/MIN/1.73 M^2
EST. GFR  (NON AFRICAN AMERICAN): 28.3 ML/MIN/1.73 M^2
EST. GFR  (NON AFRICAN AMERICAN): 28.3 ML/MIN/1.73 M^2
GLUCOSE SERPL-MCNC: 151 MG/DL (ref 70–110)
GLUCOSE SERPL-MCNC: 151 MG/DL (ref 70–110)
HCT VFR BLD AUTO: 33.1 % (ref 37–48.5)
HDLC SERPL-MCNC: 43 MG/DL (ref 40–75)
HDLC SERPL: 30.1 % (ref 20–50)
HGB BLD-MCNC: 9.9 G/DL (ref 12–16)
IMM GRANULOCYTES # BLD AUTO: 0.03 K/UL (ref 0–0.04)
IMM GRANULOCYTES NFR BLD AUTO: 0.4 % (ref 0–0.5)
LDLC SERPL CALC-MCNC: 66.4 MG/DL (ref 63–159)
LYMPHOCYTES # BLD AUTO: 1.9 K/UL (ref 1–4.8)
LYMPHOCYTES NFR BLD: 25.4 % (ref 18–48)
MCH RBC QN AUTO: 27.2 PG (ref 27–31)
MCHC RBC AUTO-ENTMCNC: 29.9 G/DL (ref 32–36)
MCV RBC AUTO: 91 FL (ref 82–98)
MONOCYTES # BLD AUTO: 0.8 K/UL (ref 0.3–1)
MONOCYTES NFR BLD: 10 % (ref 4–15)
NEUTROPHILS # BLD AUTO: 4.3 K/UL (ref 1.8–7.7)
NEUTROPHILS NFR BLD: 57.4 % (ref 38–73)
NONHDLC SERPL-MCNC: 100 MG/DL
NRBC BLD-RTO: 0 /100 WBC
PLATELET # BLD AUTO: 282 K/UL (ref 150–350)
PMV BLD AUTO: 11.6 FL (ref 9.2–12.9)
POTASSIUM SERPL-SCNC: 4.3 MMOL/L (ref 3.5–5.1)
POTASSIUM SERPL-SCNC: 4.3 MMOL/L (ref 3.5–5.1)
PROT SERPL-MCNC: 6.9 G/DL (ref 6–8.4)
RBC # BLD AUTO: 3.64 M/UL (ref 4–5.4)
SODIUM SERPL-SCNC: 141 MMOL/L (ref 136–145)
SODIUM SERPL-SCNC: 141 MMOL/L (ref 136–145)
TRIGL SERPL-MCNC: 168 MG/DL (ref 30–150)
WBC # BLD AUTO: 7.49 K/UL (ref 3.9–12.7)

## 2019-11-13 PROCEDURE — 36415 COLL VENOUS BLD VENIPUNCTURE: CPT | Mod: PO

## 2019-11-13 PROCEDURE — 80053 COMPREHEN METABOLIC PANEL: CPT

## 2019-11-13 PROCEDURE — 80061 LIPID PANEL: CPT

## 2019-11-13 PROCEDURE — 85025 COMPLETE CBC W/AUTO DIFF WBC: CPT

## 2019-11-13 PROCEDURE — 83036 HEMOGLOBIN GLYCOSYLATED A1C: CPT

## 2019-11-14 ENCOUNTER — TELEPHONE (OUTPATIENT)
Dept: ENDOCRINOLOGY | Facility: CLINIC | Age: 69
End: 2019-11-14

## 2019-11-14 LAB
ESTIMATED AVG GLUCOSE: 189 MG/DL (ref 68–131)
HBA1C MFR BLD HPLC: 8.2 % (ref 4–5.6)

## 2019-11-14 NOTE — TELEPHONE ENCOUNTER
Called pt in order her sensor for Dexcom. Pt stated that her lod sensor was not working properly so she did put the new one on and new one is not work. Pt called Dex[employer] and they told to put the new one.  Pt need assistant with sensor.   Pt called Maria G but she was  not available. Please help pt with sensor.

## 2019-11-14 NOTE — TELEPHONE ENCOUNTER
----- Message from Kasey Singh MA sent at 11/13/2019  4:45 PM CST -----  Contact: pt       ----- Message -----  From: Radha Hardy  Sent: 11/13/2019   4:43 PM CST  To: [facility] Staff    Please give pt a call, her sensor is not working properly.  Call back # [phone]

## 2019-11-14 NOTE — TELEPHONE ENCOUNTER
Called pt to check on Dexcom sensor and pt stated that Dexcom work good right now and if pt have any issue she will talk to Ursula In her future visit.

## 2019-11-15 ENCOUNTER — PATIENT OUTREACH (OUTPATIENT)
Dept: ADMINISTRATIVE | Facility: OTHER | Age: 69
End: 2019-11-15

## 2019-11-20 ENCOUNTER — OFFICE VISIT (OUTPATIENT)
Dept: ENDOCRINOLOGY | Facility: CLINIC | Age: 69
End: 2019-11-20
Payer: MEDICARE

## 2019-11-20 ENCOUNTER — OFFICE VISIT (OUTPATIENT)
Dept: NEPHROLOGY | Facility: CLINIC | Age: 69
End: 2019-11-20
Payer: MEDICARE

## 2019-11-20 ENCOUNTER — PATIENT OUTREACH (OUTPATIENT)
Dept: DIABETES | Facility: CLINIC | Age: 69
End: 2019-11-20

## 2019-11-20 VITALS
BODY MASS INDEX: 48.82 KG/M2 | SYSTOLIC BLOOD PRESSURE: 116 MMHG | HEIGHT: 65 IN | OXYGEN SATURATION: 98 % | HEART RATE: 67 BPM | WEIGHT: 293 LBS | DIASTOLIC BLOOD PRESSURE: 54 MMHG

## 2019-11-20 VITALS
WEIGHT: 293 LBS | HEIGHT: 65 IN | HEART RATE: 83 BPM | DIASTOLIC BLOOD PRESSURE: 42 MMHG | BODY MASS INDEX: 48.82 KG/M2 | SYSTOLIC BLOOD PRESSURE: 110 MMHG

## 2019-11-20 DIAGNOSIS — N18.4 STAGE 4 CHRONIC KIDNEY DISEASE: ICD-10-CM

## 2019-11-20 DIAGNOSIS — N18.4 TYPE 2 DM WITH CKD STAGE 4 AND HYPERTENSION: ICD-10-CM

## 2019-11-20 DIAGNOSIS — D63.1 ANEMIA OF RENAL DISEASE: ICD-10-CM

## 2019-11-20 DIAGNOSIS — E11.22 TYPE 2 DM WITH CKD STAGE 4 AND HYPERTENSION: ICD-10-CM

## 2019-11-20 DIAGNOSIS — E11.42 TYPE 2 DIABETES MELLITUS WITH DIABETIC POLYNEUROPATHY, WITH LONG-TERM CURRENT USE OF INSULIN: Primary | ICD-10-CM

## 2019-11-20 DIAGNOSIS — E87.20 ACIDOSIS, METABOLIC: ICD-10-CM

## 2019-11-20 DIAGNOSIS — Z79.4 TYPE 2 DIABETES MELLITUS WITH DIABETIC POLYNEUROPATHY, WITH LONG-TERM CURRENT USE OF INSULIN: Primary | ICD-10-CM

## 2019-11-20 DIAGNOSIS — E11.65 TYPE 2 DIABETES MELLITUS WITH HYPERGLYCEMIA, WITH LONG-TERM CURRENT USE OF INSULIN: Primary | ICD-10-CM

## 2019-11-20 DIAGNOSIS — E66.01 MORBID OBESITY WITH BMI OF 45.0-49.9, ADULT: ICD-10-CM

## 2019-11-20 DIAGNOSIS — I12.9 TYPE 2 DM WITH CKD STAGE 4 AND HYPERTENSION: ICD-10-CM

## 2019-11-20 DIAGNOSIS — N18.30 CKD (CHRONIC KIDNEY DISEASE) STAGE 3, GFR 30-59 ML/MIN: Primary | ICD-10-CM

## 2019-11-20 DIAGNOSIS — Z79.4 TYPE 2 DIABETES MELLITUS WITH HYPERGLYCEMIA, WITH LONG-TERM CURRENT USE OF INSULIN: Primary | ICD-10-CM

## 2019-11-20 DIAGNOSIS — N18.9 ANEMIA OF RENAL DISEASE: ICD-10-CM

## 2019-11-20 DIAGNOSIS — I10 ESSENTIAL HYPERTENSION: ICD-10-CM

## 2019-11-20 PROCEDURE — 1159F MED LIST DOCD IN RCRD: CPT | Mod: ,,, | Performed by: INTERNAL MEDICINE

## 2019-11-20 PROCEDURE — 99213 OFFICE O/P EST LOW 20 MIN: CPT | Mod: PBBFAC,27 | Performed by: NURSE PRACTITIONER

## 2019-11-20 PROCEDURE — 1159F PR MEDICATION LIST DOCUMENTED IN MEDICAL RECORD: ICD-10-PCS | Mod: ,,, | Performed by: NURSE PRACTITIONER

## 2019-11-20 PROCEDURE — 99214 OFFICE O/P EST MOD 30 MIN: CPT | Mod: S$PBB,,, | Performed by: NURSE PRACTITIONER

## 2019-11-20 PROCEDURE — 99999 PR PBB SHADOW E&M-EST. PATIENT-LVL V: CPT | Mod: PBBFAC,,, | Performed by: INTERNAL MEDICINE

## 2019-11-20 PROCEDURE — 99215 OFFICE O/P EST HI 40 MIN: CPT | Mod: PBBFAC | Performed by: INTERNAL MEDICINE

## 2019-11-20 PROCEDURE — 1126F AMNT PAIN NOTED NONE PRSNT: CPT | Mod: ,,, | Performed by: NURSE PRACTITIONER

## 2019-11-20 PROCEDURE — 99999 PR PBB SHADOW E&M-EST. PATIENT-LVL III: CPT | Mod: PBBFAC,,, | Performed by: NURSE PRACTITIONER

## 2019-11-20 PROCEDURE — 1126F PR PAIN SEVERITY QUANTIFIED, NO PAIN PRESENT: ICD-10-PCS | Mod: ,,, | Performed by: INTERNAL MEDICINE

## 2019-11-20 PROCEDURE — 99214 PR OFFICE/OUTPT VISIT, EST, LEVL IV, 30-39 MIN: ICD-10-PCS | Mod: S$PBB,,, | Performed by: INTERNAL MEDICINE

## 2019-11-20 PROCEDURE — 99999 PR PBB SHADOW E&M-EST. PATIENT-LVL III: ICD-10-PCS | Mod: PBBFAC,,, | Performed by: NURSE PRACTITIONER

## 2019-11-20 PROCEDURE — 99214 OFFICE O/P EST MOD 30 MIN: CPT | Mod: S$PBB,,, | Performed by: INTERNAL MEDICINE

## 2019-11-20 PROCEDURE — 1126F AMNT PAIN NOTED NONE PRSNT: CPT | Mod: ,,, | Performed by: INTERNAL MEDICINE

## 2019-11-20 PROCEDURE — 1159F PR MEDICATION LIST DOCUMENTED IN MEDICAL RECORD: ICD-10-PCS | Mod: ,,, | Performed by: INTERNAL MEDICINE

## 2019-11-20 PROCEDURE — 99214 PR OFFICE/OUTPT VISIT, EST, LEVL IV, 30-39 MIN: ICD-10-PCS | Mod: S$PBB,,, | Performed by: NURSE PRACTITIONER

## 2019-11-20 PROCEDURE — 99999 PR PBB SHADOW E&M-EST. PATIENT-LVL V: ICD-10-PCS | Mod: PBBFAC,,, | Performed by: INTERNAL MEDICINE

## 2019-11-20 PROCEDURE — 1159F MED LIST DOCD IN RCRD: CPT | Mod: ,,, | Performed by: NURSE PRACTITIONER

## 2019-11-20 PROCEDURE — 1126F PR PAIN SEVERITY QUANTIFIED, NO PAIN PRESENT: ICD-10-PCS | Mod: ,,, | Performed by: NURSE PRACTITIONER

## 2019-11-20 RX ORDER — SODIUM BICARBONATE 325 MG/1
650 TABLET ORAL 2 TIMES DAILY
Qty: 120 TABLET | Refills: 11 | COMMUNITY
Start: 2019-11-20 | End: 2021-02-24 | Stop reason: SDUPTHER

## 2019-11-20 NOTE — LETTER
November 20, 2019      Mamie Cotton MD  [address]  [place] [address]           [facility] - Nephrology  [address]  [place] [address]  Phone: [phone]  Fax: [phone]          Patient: Annika Mac   MR Number: [id number]   YOB: 1950   Date of Visit: 11/20/2019       Dear Dr. Mamie Cotton:    Thank you for referring Annika Mac to me for evaluation. Attached you will find relevant portions of my assessment and plan of care.    If you have questions, please do not hesitate to call me. I look forward to following Annika Mac along with you.    Sincerely,    Hillary Elias MD    Enclosure  CC:  No Recipients    If you would like to receive this communication electronically, please contact [email or web address] or [phone] to request more information on [employer] access.    For providers and/or their staff who would like to refer a patient to [facility], please contact us through our one-stop-shop provider referral line, [facility], at [phone].    If you feel you have received this communication in error or would no longer like to receive these types of communications, please e-mail [email or web address]

## 2019-11-20 NOTE — PROGRESS NOTES
Patient seen after NP visit.  Dexcom reviewed.  She is eating less carbs per meal than on our previous visit. Excursions noted especially after meals.  Discussed monitoring post meal glucoses since she wears the Dexcom and to note if excursions are related to particular foods. Advised to choose lower carbs snacks. Understanding verbalized.

## 2019-11-20 NOTE — PROGRESS NOTES
Progress Note  Nephrology      Referring physician: Mamie Cotton MD    Reason for visit: CKD     SUBJECTIVE:   68 y.o. female new to me  has a past medical history of Allergy, Anemia (7/27/2012), Arthritis, CHF (congestive heart failure), CKD (chronic kidney disease) stage 3, GFR 30-59 ml/min (7/27/2012), Clotting disorder, Colon polyp, Deep vein thrombophlebitis of left leg (7/27/2012), Degenerative disc disease, Diabetic peripheral neuropathy associated with type 2 diabetes mellitus (7/27/2012), GERD (gastroesophageal reflux disease), HTN (hypertension) (7/27/2012), Hyperlipidemia (7/27/2012), Lead-induced gout of ankle or foot, Nonproliferative diabetic retinopathy of right eye (7/27/2012), Obstructive sleep apnea (7/27/2012), Osteoporosis, Proliferative diabetic retinopathy of left eye (7/27/2012), Stroke (8/1/2003), Type 2 diabetes mellitus with diabetic polyneuropathy (7/27/2012), and Ulcer. who has been following up in renal clinic for ckd with Dr. Toledo in the past. Patient feels well today, no urinary or cardiac symptoms, no NSAIDs intake. Her renal function has been stable for many years now.        OBJECTIVE:     Vitals:    11/20/19 0908   BP: (!) 116/54   Pulse: 67          Physical Exam:  General: no distress, well nourished  HENT: PERRLA, Normal mouth nose and ears.  Neck: no JVD and thyroid not enlarged, symmetric, no tenderness/mass/nodules  Lungs: clear to auscultation bilaterally and normal respiratory effort  Cardiovascular: regular rate and rhythm, S1, S2 normal, no murmur, click, rub or gallop.   Abdomen: soft, non-tender non-distented; bowel sounds normal  Skin: No rashes or lesions  Musculoskeletal:trace edema in LE, no deformities.   Lymph Nodes: No cervical or supraclavicular adenopathy  Neurologic: Normal strength and tone. No focal numbness or weakness    Dialysis Access: Not applicable.        Medications:    Current Outpatient Medications:     albuterol (PROAIR HFA) 90  "mcg/actuation inhaler, Inhale 2 puffs into the lungs every 6 (six) hours as needed for Wheezing. Rescue, Disp: 18 g, Rfl: 6    allopurinol (ZYLOPRIM) 300 MG tablet, Take 300 mg by mouth once daily. , Disp: , Rfl:     amLODIPine (NORVASC) 10 MG tablet, Take 1 tablet (10 mg total) by mouth once daily., Disp: 90 tablet, Rfl: 3    aspirin 81 MG Chew, Take 1 tablet (81 mg total) by mouth once daily., Disp: , Rfl: 0    BD ULTRA-FINE KIKA PEN NEEDLE 32 gauge x 5/32" Ndle, To use 4 times daily, Disp: 200 each, Rfl: 20    blood sugar diagnostic Strp, 1 each by Misc.(Non-Drug; Combo Route) route 3 (three) times daily. Dx code  E11.42 . Contour Next, Disp: 120 each, Rfl: 12    blood-glucose meter kit, Use as instructed, true test/result meter, Disp: 1 each, Rfl: 0    chlorthalidone (HYGROTEN) 25 MG Tab, Take 1 tablet (25 mg total) by mouth once daily., Disp: 30 tablet, Rfl: 11    colchicine (COLCRYS) 0.6 mg tablet, Take 0.6 mg by mouth as needed., Disp: , Rfl:     DULoxetine (CYMBALTA) 30 MG capsule, Take 1 capsule (30 mg total) by mouth once daily., Disp: 90 capsule, Rfl: 3    famotidine (PEPCID) 20 MG tablet, Take 1 tablet (20 mg total) by mouth 2 (two) times daily., Disp: 1 tablet, Rfl: 0    fexofenadine (ALLEGRA) 180 MG tablet, TAKE 1 TABLET BY MOUTH ONCE DAILY, Disp: 30 tablet, Rfl: 0    finasteride (PROSCAR) 5 mg tablet, TAKE 1 TABLET(5 MG) BY MOUTH EVERY DAY, Disp: 90 tablet, Rfl: 1    fluocinonide (LIDEX) 0.05 % external solution, AAA scalp qday - bid prn pruritus, Disp: 60 mL, Rfl: 3    glucagon, human recombinant, (GLUCAGON EMERGENCY KIT, HUMAN,) 1 mg SolR, Inject 1 mg into the muscle as needed., Disp: 1 each, Rfl: 3    insulin NPH-insulin regular, 70/30, (NOVOLIN 70/30) 100 unit/mL (70-30) injection, Breakfast: 100 -110 units Lunch: 80 - 90 units Dinner: 65 - 70 units MAX TDD: 270, Disp: 4 vial, Rfl: 11    irbesartan (AVAPRO) 300 MG tablet, Take 1 tablet (300 mg total) by mouth every evening., Disp: " 90 tablet, Rfl: 3    labetalol (NORMODYNE) 300 MG tablet, Take 1 tablet (300 mg total) by mouth 2 (two) times daily., Disp: 180 tablet, Rfl: 3    lancets 31 gauge Misc, 1 lancet by Misc.(Non-Drug; Combo Route) route 4 (four) times daily. E11.42 . Prescribed one touch, Disp: 200 each, Rfl: 6    multivitamin (THERAGRAN) per tablet, Take 1 tablet by mouth once daily., Disp: , Rfl:     pregabalin (LYRICA) 150 MG capsule, Take 1 capsule (150 mg total) by mouth 2 (two) times daily., Disp: 60 capsule, Rfl: 5    promethazine (PHENERGAN) 6.25 mg/5 mL syrup, Take 5 mLs (6.25 mg total) by mouth every 8 (eight) hours as needed for Nausea., Disp: 118 mL, Rfl: 0    simvastatin (ZOCOR) 40 MG tablet, Take 1 tablet (40 mg total) by mouth every evening., Disp: 90 tablet, Rfl: 4    traMADol (ULTRAM) 50 mg tablet, Take 1 tablet (50 mg total) by mouth every 8 (eight) hours as needed., Disp: 90 tablet, Rfl: 3    triamcinolone acetonide 0.1% (KENALOG) 0.1 % ointment, AAA bid hands, Disp: 60 g, Rfl: 1    blood-glucose meter kit, 1 each by Other route once daily. Use daily. Insurance proffered one touch  E11.42, Disp: 1 each, Rfl: 0    insulin NPH-insulin regular, 70/30, (NOVOLIN 70/30 U-100 INSULIN) 100 unit/mL (70-30) injection, INJECT 120 UNITS INTO THE SKIN WITH BREAKFAST, 110 UNITS WITH LUNCH, AND INJECT 90 UNITS WITH DINNER ; , Disp: 300 mL, Rfl: 3    insulin regular 100 unit/mL Inj injection, Inject 120 Units into the skin 3 (three) times daily before meals. 120 units with breakfast, 110 units with lunch, and 90 units with dinner; , Disp: , Rfl:     ketoconazole (NIZORAL) 2 % shampoo, Wash hair with medicated shampoo at least 2x/week - let sit on scalp at least 5 minutes prior to rinsing, Disp: 120 mL, Rfl: 5    lancets 33 gauge Misc, 1 lancet by Misc.(Non-Drug; Combo Route) route 4 (four) times daily. Dx code E11.42, Disp: 200 each, Rfl: 12    sodium bicarbonate 325 MG tablet, Take 2 tablets (650 mg  total) by mouth 2 (two) times daily., Disp: 120 tablet, Rfl: 11         Laboratory:  Lab Results   Component Value Date    CREATININE 1.8 (H) 11/13/2019    CREATININE 1.8 (H) 11/13/2019       Prot/Creat Ratio, Ur   Date Value Ref Range Status   11/13/2019 Unable to calculate 0.00 - 0.20 Final   02/13/2019 Unable to calculate 0.00 - 0.20 Final   05/16/2017 Unable to calculate 0.00 - 0.20 Final       Lab Results   Component Value Date     11/13/2019     11/13/2019    K 4.3 11/13/2019    K 4.3 11/13/2019    CO2 22 (L) 11/13/2019    CO2 22 (L) 11/13/2019       last PTH   Lab Results   Component Value Date    PTH 88.0 (H) 08/05/2019    CALCIUM 10.0 11/13/2019    CALCIUM 10.0 11/13/2019    CAION 1.27 02/28/2012    PHOS 3.6 09/03/2019       Lab Results   Component Value Date    HGB 9.9 (L) 11/13/2019        Lab Results   Component Value Date    HGBA1C 8.2 (H) 11/13/2019       Lab Results   Component Value Date    LDLCALC 66.4 11/13/2019       Other Labs were reviewed      ASSESSMENT/PLAN:       CKD III B/A1  -likely from DMII and HTN  -Cr baseline ~ 1.6 - 1.8 with eGFR ~ 33 ml/min stable for years  -UPCR none  -Renal US ckd, simple cysts    HTN  controlled    Anemia  -from ckd  -Hgb goal ~ 10  -Iron stores not available    Secondary Hyperparathyroidism  -Phos/Ca acceptable  -PTH acceptable  -Vit D na    Acid/Base  -Metabolic acidosis            PLAN:  -Need better control of DMII  -Will start Nabicarb  -Continue current BP meds regimen  -Avoid NSAIDs intake        RTC in 10 months with labs      JEANNE NORMAN MD  NEPHROLOGY ATTENDING

## 2019-11-22 ENCOUNTER — OFFICE VISIT (OUTPATIENT)
Dept: URGENT CARE | Facility: CLINIC | Age: 69
End: 2019-11-22
Payer: MEDICARE

## 2019-11-22 VITALS
TEMPERATURE: 98 F | BODY MASS INDEX: 48.82 KG/M2 | SYSTOLIC BLOOD PRESSURE: 148 MMHG | WEIGHT: 293 LBS | HEIGHT: 65 IN | HEART RATE: 74 BPM | DIASTOLIC BLOOD PRESSURE: 66 MMHG | OXYGEN SATURATION: 97 %

## 2019-11-22 DIAGNOSIS — J06.9 UPPER RESPIRATORY TRACT INFECTION, UNSPECIFIED TYPE: Primary | ICD-10-CM

## 2019-11-22 DIAGNOSIS — I12.9 TYPE 2 DM WITH CKD STAGE 4 AND HYPERTENSION: ICD-10-CM

## 2019-11-22 DIAGNOSIS — M54.9 LEFT-SIDED BACK PAIN, UNSPECIFIED BACK LOCATION, UNSPECIFIED CHRONICITY: ICD-10-CM

## 2019-11-22 DIAGNOSIS — E11.22 TYPE 2 DM WITH CKD STAGE 4 AND HYPERTENSION: ICD-10-CM

## 2019-11-22 DIAGNOSIS — N18.4 TYPE 2 DM WITH CKD STAGE 4 AND HYPERTENSION: ICD-10-CM

## 2019-11-22 LAB
BILIRUB UR QL STRIP: NEGATIVE
CTP QC/QA: YES
FLUAV AG NPH QL: NEGATIVE
FLUBV AG NPH QL: NEGATIVE
GLUCOSE UR QL STRIP: NEGATIVE
KETONES UR QL STRIP: NEGATIVE
LEUKOCYTE ESTERASE UR QL STRIP: NEGATIVE
PH, POC UA: 5
POC BLOOD, URINE: NEGATIVE
POC NITRATES, URINE: NEGATIVE
PROT UR QL STRIP: NEGATIVE
SP GR UR STRIP: 1.02 (ref 1–1.03)
UROBILINOGEN UR STRIP-ACNC: NORMAL (ref 0.1–1.1)

## 2019-11-22 PROCEDURE — 99214 OFFICE O/P EST MOD 30 MIN: CPT | Mod: S$GLB,,, | Performed by: NURSE PRACTITIONER

## 2019-11-22 PROCEDURE — 1125F PR PAIN SEVERITY QUANTIFIED, PAIN PRESENT: ICD-10-PCS | Mod: S$GLB,,, | Performed by: NURSE PRACTITIONER

## 2019-11-22 PROCEDURE — 1159F PR MEDICATION LIST DOCUMENTED IN MEDICAL RECORD: ICD-10-PCS | Mod: S$GLB,,, | Performed by: NURSE PRACTITIONER

## 2019-11-22 PROCEDURE — 1125F AMNT PAIN NOTED PAIN PRSNT: CPT | Mod: S$GLB,,, | Performed by: NURSE PRACTITIONER

## 2019-11-22 PROCEDURE — 81003 URINALYSIS AUTO W/O SCOPE: CPT | Mod: QW,S$GLB,, | Performed by: NURSE PRACTITIONER

## 2019-11-22 PROCEDURE — 99214 PR OFFICE/OUTPT VISIT, EST, LEVL IV, 30-39 MIN: ICD-10-PCS | Mod: S$GLB,,, | Performed by: NURSE PRACTITIONER

## 2019-11-22 PROCEDURE — 87804 POCT INFLUENZA A/B: ICD-10-PCS | Mod: 59,QW,S$GLB, | Performed by: NURSE PRACTITIONER

## 2019-11-22 PROCEDURE — 81003 POCT URINALYSIS, DIPSTICK, MANUAL, W/O SCOPE: ICD-10-PCS | Mod: QW,S$GLB,, | Performed by: NURSE PRACTITIONER

## 2019-11-22 PROCEDURE — 1159F MED LIST DOCD IN RCRD: CPT | Mod: S$GLB,,, | Performed by: NURSE PRACTITIONER

## 2019-11-22 PROCEDURE — 87804 INFLUENZA ASSAY W/OPTIC: CPT | Mod: QW,S$GLB,, | Performed by: NURSE PRACTITIONER

## 2019-11-22 NOTE — PATIENT INSTRUCTIONS
PLAN: Lab work POCT UA, POCT Influenza screen  Advise increase p.o. fluids--water/juice & rest  Simply saline nasal wash to irrigate sinuses and for congestion/runny nose.  Cool mist humidifier/vaporizer.  Practice good handwashing.  Tylenol or Ibuprofen for fever, headache and body aches.  Warm salt water gargles for throat comfort.  Chloraseptic spray or lozenges for throat comfort.  Advise follow up with PCP in  2-3 days for recheck  Advise go to ER if symptoms worsen or fail to improve with treatment.  Instructions, follow up, and supportive care as per AVS.  AVS provided and reviewed with patient including supportive care, follow up, and red flag symptoms.   Patient verbalizes understanding and agrees with treatment plan. Discharged from Urgent Care in stable condition.  .

## 2019-11-22 NOTE — PROGRESS NOTES
"Subjective:       Patient ID: Annika Mac is a 68 y.o. female.    Vitals:  height is 5' 5" (1.651 m) and weight is 137.5 kg (303 lb 2.1 oz) (abnormal). Her oral temperature is 98.4 °F (36.9 °C). Her blood pressure is 148/66 (abnormal) and her pulse is 74. Her oxygen saturation is 97%.     Chief Complaint: URI and Back Pain    URI    This is a new problem. The current episode started in the past 7 days (2 days). The problem has been gradually worsening. There has been no fever. Associated symptoms include congestion, coughing, ear pain, headaches, sinus pain, sneezing and a sore throat. Pertinent negatives include no abdominal pain, chest pain, diarrhea, dysuria, joint pain, joint swelling, nausea, neck pain, plugged ear sensation, rash, rhinorrhea, swollen glands, vomiting or wheezing. Treatments tried: Perioxide/salt gargle. The treatment provided no relief.   Back Pain   This is a new problem. The current episode started in the past 7 days (2 days). The problem occurs constantly. The problem has been gradually worsening since onset. The pain is present in the lumbar spine. The quality of the pain is described as aching. The pain does not radiate. The pain is at a severity of 9/10. The pain is severe. The pain is the same all the time. Stiffness is present all day. Associated symptoms include headaches. Pertinent negatives include no abdominal pain, bladder incontinence, bowel incontinence, chest pain, dysuria, fever, leg pain, numbness, paresis, paresthesias, pelvic pain, perianal numbness, tingling, weakness or weight loss. Treatments tried: Tramadol. The treatment provided no relief.       Constitution: Negative for chills, sweating, fatigue and fever.   HENT: Positive for ear pain, congestion, postnasal drip, sinus pain, sinus pressure and sore throat. Negative for voice change.    Neck: Negative for neck pain and painful lymph nodes.   Cardiovascular: Negative for chest pain.   Eyes: Negative for eye redness. "   Respiratory: Positive for cough and sputum production. Negative for chest tightness, bloody sputum, COPD, shortness of breath, stridor, wheezing and asthma.    Gastrointestinal: Negative for abdominal pain, nausea, vomiting, diarrhea and bowel incontinence.   Endocrine: negative.   Genitourinary: Negative for dysuria, urgency, bladder incontinence, hematuria and pelvic pain.   Musculoskeletal: Positive for pain, back pain and muscle ache. Negative for muscle cramps and history of spine disorder.   Skin: Negative for rash.   Allergic/Immunologic: Positive for sneezing. Negative for seasonal allergies and asthma.   Neurological: Positive for headaches. Negative for coordination disturbances, numbness and tingling.   Hematologic/Lymphatic: Negative for swollen lymph nodes.   Psychiatric/Behavioral: Negative.        Past Medical History:   Diagnosis Date    Allergy     Anemia 2012    Arthritis     CHF (congestive heart failure)     CKD (chronic kidney disease) stage 3, GFR 30-59 ml/min 2012    Clotting disorder     Colon polyp     Deep vein thrombophlebitis of left leg 2012    Degenerative disc disease     Diabetic peripheral neuropathy associated with type 2 diabetes mellitus 2012    GERD (gastroesophageal reflux disease)     HTN (hypertension) 2012    Hyperlipidemia 2012    Lead-induced gout of ankle or foot     right going on three weeks    Nonproliferative diabetic retinopathy of right eye 2012    Obstructive sleep apnea 2012    Osteoporosis     Proliferative diabetic retinopathy of left eye 2012    Stroke 2003    Type 2 diabetes mellitus with diabetic polyneuropathy 2012    Ulcer          .  Past Surgical History:   Procedure Laterality Date    CATARACT EXTRACTION W/  INTRAOCULAR LENS IMPLANT Left 3/2002    left     CATARACT EXTRACTION W/  INTRAOCULAR LENS IMPLANT Right     OD dr. olivares     SECTION       COLONOSCOPY N/A 2/26/2018    Procedure: COLONOSCOPY;  Surgeon: Faizan Mac MD;  Location: [facility] ([address] [place] [address]);  Service: Endoscopy;  Laterality: N/A;    CYST REMOVAL  2/11/2011    left side of face    EYE SURGERY Bilateral 2012    eye implants    GANGLION CYST EXCISION  11/13/2007    Right wrist    Pan Retinal Photocoagulation Bilateral     Dr. Monterroso (Proliferative Diabetic Retinopathy)    TOTAL ABDOMINAL HYSTERECTOMY  1982    TOTAL KNEE ARTHROPLASTY  2/2006    left    TRIGGER FINGER RELEASE  9/18/2009         Social History     Socioeconomic History    Marital status:      Spouse name: Heber    Number of children: 3    Years of education: Not on file    Highest education level: Not on file   Occupational History    Not on file   Social Needs    Financial resource strain: Not on file    Food insecurity:     Worry: Not on file     Inability: Not on file    Transportation needs:     Medical: Not on file     Non-medical: Not on file   Tobacco Use    Smoking status: Never Smoker    Smokeless tobacco: Never Used   Substance and Sexual Activity    Alcohol use: No    Drug use: No    Sexual activity: Yes     Partners: Male   Lifestyle    Physical activity:     Days per week: Not on file     Minutes per session: Not on file    Stress: Not on file   Relationships    Social connections:     Talks on phone: Not on file     Gets together: Not on file     Attends Sabianism service: Not on file     Active member of club or organization: Not on file     Attends meetings of clubs or organizations: Not on file     Relationship status: Not on file   Other Topics Concern    Not on file   Social History Narrative    , lives with family; 3 children, 2 grandchildren       Family History   Problem Relation Age of Onset    Diabetes Mother     Hypertension Mother     Heart disease Father     Diabetes Sister     Thyroid disease Brother     Diabetes Brother     Hypertension Brother     No Known  Problems Daughter     No Known Problems Son     No Known Problems Daughter     Amblyopia Neg Hx     Blindness Neg Hx     Glaucoma Neg Hx     Macular degeneration Neg Hx     Retinal detachment Neg Hx     Strabismus Neg Hx     Colon cancer Neg Hx     Esophageal cancer Neg Hx          ROS:  GENERAL: fever, chills with body aches  SKIN: No rashes, itching or changes in color or texture of skin.   HEENT:  Reports runny nose, nasal congestion, headache  NODES: No masses or lesions. Denies swollen glands.   CHEST: reports cough   CARDIOVASCULAR: Denies chest pain, shortness of breath.  ABDOMEN:  Nausea, No weight loss. Denies diarrhea, abdominal pain  :  Urinary frequency  MUSCULOSKELETAL:  back pain.  NEUROLOGIC: No history of seizures, paralysis, alteration of gait or coordination.  PSYCHIATRIC: Denies mood swings, depression or suicidal thoughts.    PE:   APPEARANCE: Well nourished, well developed, in mild distress.   V/S: Reviewed.  SKIN: Normal skin turgor, no lesions.  HEENT: Turbinates injected, mucous membranes okay minimal red pharynx. TM's poor light reflex bilateral, no facial tenderness.  CHEST: Lungs clear to auscultation. No wheezing  CARDIOVASCULAR: Regular rate and rhythm.  ABDOMEN: Soft. No tenderness or masses.  NEUROLOGIC: No sensory deficits. Gait & Posture: Normal, No cerebellar signs.  MENTAL STATUS: Patient alert, oriented x 3 & conversant.    PLAN: Lab work POCT UA, POCT Influenza screen  Advise increase p.o. fluids--water/juice & rest  Simply saline nasal wash to irrigate sinuses and for congestion/runny nose.  Cool mist humidifier/vaporizer.  Practice good handwashing.  Tylenol or Ibuprofen for fever, headache and body aches.  Warm salt water gargles for throat comfort.  Chloraseptic spray or lozenges for throat comfort.  Advise follow up with PCP  Advise go to ER if symptoms worsen or fail to improve with treatment.  Instructions, follow up, and supportive care as per AVS.  AVS  provided and reviewed with patient including supportive care, follow up, and red flag symptoms.   Patient verbalizes understanding and agrees with treatment plan. Discharged from Urgent Care in stable condition.  .      DIAGNOSIS:  Fever  Body aches  Back pain  Urinary frequency  Type 2 diabetes mellitus with CKD stage 4 and hypertension

## 2019-11-24 ENCOUNTER — PATIENT OUTREACH (OUTPATIENT)
Dept: ADMINISTRATIVE | Facility: OTHER | Age: 69
End: 2019-11-24

## 2019-11-26 ENCOUNTER — OFFICE VISIT (OUTPATIENT)
Dept: OPHTHALMOLOGY | Facility: CLINIC | Age: 69
End: 2019-11-26
Payer: MEDICARE

## 2019-11-26 ENCOUNTER — TELEPHONE (OUTPATIENT)
Dept: ENDOCRINOLOGY | Facility: CLINIC | Age: 69
End: 2019-11-26

## 2019-11-26 DIAGNOSIS — H35.373 EPIRETINAL MEMBRANE (ERM) OF BOTH EYES: ICD-10-CM

## 2019-11-26 PROCEDURE — 99999 PR PBB SHADOW E&M-EST. PATIENT-LVL IV: ICD-10-PCS | Mod: PBBFAC,,, | Performed by: OPHTHALMOLOGY

## 2019-11-26 PROCEDURE — 92014 COMPRE OPH EXAM EST PT 1/>: CPT | Mod: S$PBB,,, | Performed by: OPHTHALMOLOGY

## 2019-11-26 PROCEDURE — 92134 POSTERIOR SEGMENT OCT RETINA (OCULAR COHERENCE TOMOGRAPHY)-BOTH EYES: ICD-10-PCS | Mod: 26,S$PBB,, | Performed by: OPHTHALMOLOGY

## 2019-11-26 PROCEDURE — 92014 PR EYE EXAM, EST PATIENT,COMPREHESV: ICD-10-PCS | Mod: S$PBB,,, | Performed by: OPHTHALMOLOGY

## 2019-11-26 PROCEDURE — 92226 PR SPECIAL EYE EXAM, SUBSEQUENT: ICD-10-PCS | Mod: 50,S$PBB,, | Performed by: OPHTHALMOLOGY

## 2019-11-26 PROCEDURE — 92134 CPTRZ OPH DX IMG PST SGM RTA: CPT | Mod: PBBFAC | Performed by: OPHTHALMOLOGY

## 2019-11-26 PROCEDURE — 99999 PR PBB SHADOW E&M-EST. PATIENT-LVL IV: CPT | Mod: PBBFAC,,, | Performed by: OPHTHALMOLOGY

## 2019-11-26 PROCEDURE — 92226 PR SPECIAL EYE EXAM, SUBSEQUENT: CPT | Mod: 50,S$PBB,, | Performed by: OPHTHALMOLOGY

## 2019-11-26 PROCEDURE — 99214 OFFICE O/P EST MOD 30 MIN: CPT | Mod: PBBFAC,25 | Performed by: OPHTHALMOLOGY

## 2019-11-26 PROCEDURE — 92226 PR SPECIAL EYE EXAM, SUBSEQUENT: CPT | Mod: 50,PBBFAC | Performed by: OPHTHALMOLOGY

## 2019-11-26 NOTE — PROGRESS NOTES
HPI      12 mo f/u   DLS- 10/30/2018 Dr. Maldonado       Pt sts straining and finds she needs to readjust her eyes more often for   reading at near wears readers and they help but feels they could be   better. Also when reading vision gets blurrier overtime but when blinking   it helps clear vision.     Denies pain   (-)Flashes (+)Floaters stable   (+)Photophobia 2 weeks ago OS   (+)Glare    No gtts         OCT - OD - temporal macula atrophy  OS - paracentral ME - grossly stable      A/P    1-PCO with retained lens material:   -Following with Dr. Glover       2-PDR OU:  T2 uncontrolled on insulin   -S/P PRP OU   -PRP OS most recently 11/25/13 with new subhyaloid heme then, got Avastin 11/13/13   -Last HbA1c was 8.6 on 2/9/15   -Emphasized the importance of tight glucose control    3-ERM OU:   -Good VA, monitor    4-PCIOL OU:   -2002 OS by Rosendo   -2012 OD by Dr. Glover        F/U 1 year  OCT

## 2019-11-26 NOTE — TELEPHONE ENCOUNTER
----- Message from Carole Lopez sent at 11/26/2019  2:16 PM CST -----  Contact:   Pt  [phone]  Pt  Has  questions and concerns , no additional information was provided

## 2019-12-10 ENCOUNTER — PATIENT OUTREACH (OUTPATIENT)
Dept: ADMINISTRATIVE | Facility: HOSPITAL | Age: 69
End: 2019-12-10

## 2019-12-12 DIAGNOSIS — E11.42 TYPE 2 DIABETES MELLITUS WITH DIABETIC POLYNEUROPATHY, WITH LONG-TERM CURRENT USE OF INSULIN: ICD-10-CM

## 2019-12-12 DIAGNOSIS — Z79.4 TYPE 2 DIABETES MELLITUS WITH DIABETIC POLYNEUROPATHY, WITH LONG-TERM CURRENT USE OF INSULIN: ICD-10-CM

## 2019-12-12 NOTE — TELEPHONE ENCOUNTER
----- Message from Ginette Rocha MA sent at 12/12/2019  9:32 AM CST -----  Contact: self/[phone]  Pt stated the her novlog needs a PA and pt is requesting a call before completing the PA for the Pharm.         insulin NPH-insulin regular, 70/30, (NOVOLIN 70/30 U-100 INSULIN) 100 unit/mL (70-30) injection       [facility] [address] [place] [address] [place] [address] [address] AT [facility] [address] [place] [id number] [place] [place] [address]  Phone: [phone] Fax: [phone]

## 2019-12-13 DIAGNOSIS — Z79.4 TYPE 2 DIABETES MELLITUS WITH DIABETIC POLYNEUROPATHY, WITH LONG-TERM CURRENT USE OF INSULIN: ICD-10-CM

## 2019-12-13 DIAGNOSIS — E11.42 TYPE 2 DIABETES MELLITUS WITH DIABETIC POLYNEUROPATHY, WITH LONG-TERM CURRENT USE OF INSULIN: ICD-10-CM

## 2019-12-13 NOTE — TELEPHONE ENCOUNTER
Called patient and advice her that Dr. Pacheco is not in clinic today and that's why the pharmacy hasn't received the refill authorization, most likely it will be till Monday whe he gets back in clinic.

## 2019-12-13 NOTE — TELEPHONE ENCOUNTER
----- Message from Cristin Napoles sent at 12/13/2019  3:24 PM CST -----  Contact: pt   Please call pt at [phone]    Refill request for pregabalin (LYRICA) 150 MG capsule    Use [facility]    Pharmacy closes at 4 pm today    Thank you

## 2019-12-13 NOTE — TELEPHONE ENCOUNTER
----- Message from Noah Alcala sent at 12/13/2019 10:31 AM CST -----  Contact: Self  Pt calling in regards to meds being sent to     [address] - [address] [facility] [address]    Not [facility] anymore    Please advise pt can be reached at [phone]

## 2019-12-13 NOTE — TELEPHONE ENCOUNTER
----- Message from Cristin Napoles sent at 12/13/2019  1:42 PM CST -----  Contact: pt   Please call pt at [phone]    Refill request for pregabalin (LYRICA) 150 MG capsule    [facility]    Patient is out of medication    Thank you

## 2019-12-14 RX ORDER — PREGABALIN 150 MG/1
150 CAPSULE ORAL 2 TIMES DAILY
Qty: 60 CAPSULE | Refills: 5 | Status: SHIPPED | OUTPATIENT
Start: 2019-12-14 | End: 2020-06-13

## 2019-12-16 ENCOUNTER — TELEPHONE (OUTPATIENT)
Dept: PODIATRY | Facility: CLINIC | Age: 69
End: 2019-12-16

## 2019-12-16 DIAGNOSIS — Z79.4 TYPE 2 DIABETES MELLITUS WITH DIABETIC POLYNEUROPATHY, WITH LONG-TERM CURRENT USE OF INSULIN: ICD-10-CM

## 2019-12-16 DIAGNOSIS — E11.42 TYPE 2 DIABETES MELLITUS WITH DIABETIC POLYNEUROPATHY, WITH LONG-TERM CURRENT USE OF INSULIN: ICD-10-CM

## 2019-12-16 NOTE — TELEPHONE ENCOUNTER
----- Message from Irena Aldridge sent at 12/16/2019  8:51 AM CST -----  Contact: PT  PT is calling requesting refill on:   pregabalin (LYRICA) 150 MG capsule   Take 1 capsule (150 mg total) by mouth 2 (two) times daily. - Oral  60 capsule    Send to [facility] [place]      Callback: [phone]

## 2019-12-16 NOTE — TELEPHONE ENCOUNTER
Called insurance company and received approval for Novolin 70/30 and will be approved till next year. Case ID no is [id number].  Call insurance company is [phone].

## 2019-12-16 NOTE — TELEPHONE ENCOUNTER
----- Message from Brittany Guevara sent at 12/13/2019  2:35 PM CST -----  Contact: pt at [phone]  Rita connelly-Pt is calling in ref to her medication that was sent to the wrong pharmacy.  Needs to be sent to [facility] at [phone].  Wants to talk to you first

## 2019-12-20 ENCOUNTER — OFFICE VISIT (OUTPATIENT)
Dept: OPTOMETRY | Facility: CLINIC | Age: 69
End: 2019-12-20
Payer: MEDICARE

## 2019-12-20 ENCOUNTER — CLINICAL SUPPORT (OUTPATIENT)
Dept: DIABETES | Facility: CLINIC | Age: 69
End: 2019-12-20
Payer: MEDICARE

## 2019-12-20 DIAGNOSIS — I12.9 TYPE 2 DM WITH CKD STAGE 4 AND HYPERTENSION: Primary | ICD-10-CM

## 2019-12-20 DIAGNOSIS — Z79.4 TYPE 2 DIABETES MELLITUS WITH HYPERGLYCEMIA, WITH LONG-TERM CURRENT USE OF INSULIN: ICD-10-CM

## 2019-12-20 DIAGNOSIS — E11.65 TYPE 2 DIABETES MELLITUS WITH HYPERGLYCEMIA, WITH LONG-TERM CURRENT USE OF INSULIN: ICD-10-CM

## 2019-12-20 DIAGNOSIS — H52.13 MYOPIA WITH PRESBYOPIA OF BOTH EYES: Primary | ICD-10-CM

## 2019-12-20 DIAGNOSIS — E11.22 TYPE 2 DM WITH CKD STAGE 4 AND HYPERTENSION: Primary | ICD-10-CM

## 2019-12-20 DIAGNOSIS — N18.4 TYPE 2 DM WITH CKD STAGE 4 AND HYPERTENSION: Primary | ICD-10-CM

## 2019-12-20 DIAGNOSIS — H04.123 DRY EYE SYNDROME OF BOTH EYES: ICD-10-CM

## 2019-12-20 DIAGNOSIS — H52.4 MYOPIA WITH PRESBYOPIA OF BOTH EYES: Primary | ICD-10-CM

## 2019-12-20 PROCEDURE — 99999 PR PBB SHADOW E&M-EST. PATIENT-LVL II: CPT | Mod: PBBFAC,,,

## 2019-12-20 PROCEDURE — 99212 OFFICE O/P EST SF 10 MIN: CPT | Mod: PBBFAC | Performed by: OPTOMETRIST

## 2019-12-20 PROCEDURE — 99999 PR PBB SHADOW E&M-EST. PATIENT-LVL II: ICD-10-PCS | Mod: PBBFAC,,, | Performed by: OPTOMETRIST

## 2019-12-20 PROCEDURE — 92012 INTRM OPH EXAM EST PATIENT: CPT | Mod: S$PBB,,, | Performed by: OPTOMETRIST

## 2019-12-20 PROCEDURE — 92012 PR EYE EXAM, EST PATIENT,INTERMED: ICD-10-PCS | Mod: S$PBB,,, | Performed by: OPTOMETRIST

## 2019-12-20 PROCEDURE — G0108 DIAB MANAGE TRN  PER INDIV: HCPCS | Mod: PBBFAC

## 2019-12-20 PROCEDURE — 92015 DETERMINE REFRACTIVE STATE: CPT | Mod: ,,, | Performed by: OPTOMETRIST

## 2019-12-20 PROCEDURE — 92015 PR REFRACTION: ICD-10-PCS | Mod: ,,, | Performed by: OPTOMETRIST

## 2019-12-20 PROCEDURE — 99212 OFFICE O/P EST SF 10 MIN: CPT | Mod: PBBFAC,27

## 2019-12-20 PROCEDURE — 99999 PR PBB SHADOW E&M-EST. PATIENT-LVL II: CPT | Mod: PBBFAC,,, | Performed by: OPTOMETRIST

## 2019-12-20 PROCEDURE — 99999 PR PBB SHADOW E&M-EST. PATIENT-LVL II: ICD-10-PCS | Mod: PBBFAC,,,

## 2019-12-20 NOTE — LETTER
December 20, 2019      SUSSY Maldonado MD  [address] [facility] [address]           [facility]  [address]  [place] [address]  Phone: [phone]          Patient: Annika Mac   MR Number: [id number]   YOB: 1950   Date of Visit: 12/20/2019       Dear Dr. SUSSY Maldonado:    Thank you for referring Annika Mac to me for evaluation. Attached you will find relevant portions of my assessment and plan of care.    If you have questions, please do not hesitate to call me. I look forward to following Annika Mac along with you.    Sincerely,    Chantal Bright, OD    Enclosure  CC:  No Recipients    If you would like to receive this communication electronically, please contact [email or web address] or [phone] to request more information on [employer] access.    For providers and/or their staff who would like to refer a patient to [facility], please contact us through our one-stop-shop provider referral line, [facility] , at [phone].    If you feel you have received this communication in error or would no longer like to receive these types of communications, please e-mail [email or web address]

## 2019-12-20 NOTE — PROGRESS NOTES
HPI     Pt comes in today to update rx glasses. Pt is having trouble with near   vision. Denies any HA's. No pain. Occasional OU irritation, OS tearing. BS   was 170 this morning. Sees Dr. Maldonado regularly for PDR + ERM     Gtts: None     Last edited by Chantal Bright, OD on 12/20/2019 10:22 AM. (History)            Assessment /Plan     For exam results, see Encounter Report.    Myopia with presbyopia of both eyes    Dry eye syndrome of both eyes      Updated SRx. Increased VA with new refraction. Trial framed in office with good result. Monitor.  Educated pt. KAYLIE OU. Recommend ATs BID-TID. Monitor.      RTC with Dr. Maldonado as directed, me prn.

## 2019-12-20 NOTE — PROGRESS NOTES
Diabetes Education  Author: Maria G Lopez RN, CDE  Date: 12/20/2019    Diabetes Care Management Summary  Diabetes Education Record Assessment/Progress: Post Program/Follow-up  Current Diabetes Risk Level: Moderate     Last A1c:   Lab Results   Component Value Date    HGBA1C 8.2 (H) 11/13/2019     Last visit with Diabetes Educator: : 11/20/2019      Diabetes Type  Diabetes Type : Type II    Diabetes History  Diabetes Diagnosis: >10 years  Current Treatment: Insulin(/85/65)  Reviewed Problem List with Patient: Yes    Health Maintenance was reviewed today with patient. Discussed with patient importance of routine eye exams, foot exams/foot care, blood work (i.e.: A1c, microalbumin, and lipid), dental visits, yearly flu vaccine, and pneumonia vaccine as indicated by PCP. Patient verbalized understanding.     Health Maintenance Topics with due status: Not Due       Topic Last Completion Date    DEXA SCAN 06/09/2017    Colonoscopy 02/26/2018    TETANUS VACCINE 08/24/2018    Foot Exam 06/13/2019    Mammogram 10/30/2019    Lipid Panel 11/13/2019    Hemoglobin A1c 11/13/2019    Eye Exam 11/26/2019    High Dose Statin 12/20/2019     There are no preventive care reminders to display for this patient.    Nutrition  Meal Planning: 3 meals per day, snacks between meal  Meal Plan 24 Hour Recall - Snack: snacking on chips    Monitoring   Blood Glucose Logs: Yes  Do you use a personal continuous glucose monitor?: Yes  What kind of glucose monitor do you use?: Dexcom(Pt continues to calibrate multiple times during the day.  Counselled pt again that only 2 calibrations are required.)  In the last month, how often have you had a low blood sugar reaction?: once  What are your symptoms of low blood sugar?: steve  How do you treat low blood sugar?: OJ, but tends to overtreat which will result in rebound highs    Exercise   Exercise Type: none(has begun to walk more. Presents today without wheelchair)    Current Diabetes Treatment    Current Treatment: Insulin(/85/65) Ozempic 0.25 mg q weekly; will begin 0.5 mg dose after 12/27/19    Social History  Preferred Learning Method: Face to Face  Primary Support: Spouse, Family     Barriers to Change  Barriers to Change: Functional limitation(uses cane to walk)    Readiness to Learn   Readiness to Learn : Acceptance    Cultural Influences  Cultural Influences: None    Diabetes Education Assessment/Progress  The patient is in clinic with her daughter for f/u ,management.  Her G6 was downloaded and reviewed.  She continues to struggle with food choices especially snacking throughout the afternoon.  The majority of her elevations are after 12 pm and run in 250 plus range.  Most of her overnights are WNL with exception of a recent evening at 3 am where her glucose was in the 70 range. She over treated which resulted in rebound glucose.    Nutrition (Incorporating nutritional management into one's lifestyle): Discussion, Needs Review, Written Materials Provided  Reviewed food choices and encouraged pt to stop snacking between meals. Stressed the importance of portion control and balanced diet.    Medications (states correct name, dose, onset, peak, duration, side effects & timing of meds): Discussion, Needs Review, Written Materials Provided  MOA of Ozempic reviewed.     Monitoring (monitoring blood glucose/other parameters & using results): Discussion, Needs Review, Written Materials Provided  Patient is entering multiple glucose readings into her Dexcom.  Advised to only perform 2 calibrations.  This has been discussed with patient on other visits.  Dexcom downloaded to     Goals  Patient has selected/evaluated goals during today's session: No         Diabetes Meal Plan  Restrictions: Restricted Carbohydrate  Carbohydrate Per Meal: 30-45g  Carbohydrate Per Snack : 15-20g    Today's Self-Management Care Plan was developed with the patient's input and is based on barriers identified  during today's assessment.    The long and short-term goals in the care plan were written with the patient/caregiver's input. The patient has agreed to work toward these goals to improve her overall diabetes control.      The patient received a copy of today's self-management plan and verbalized understanding of the care plan, goals, and all of today's instructions.      The patient was encouraged to communicate with her physician and care team regarding her condition(s) and treatment.  I provided the patient with my contact information today and encouraged her to contact me via phone or patient portal as needed.     Education Units of Time   Time Spent: 60 min

## 2019-12-25 ENCOUNTER — PATIENT MESSAGE (OUTPATIENT)
Dept: PRIMARY CARE CLINIC | Facility: CLINIC | Age: 69
End: 2019-12-25

## 2019-12-26 ENCOUNTER — TELEPHONE (OUTPATIENT)
Dept: PODIATRY | Facility: CLINIC | Age: 69
End: 2019-12-26

## 2019-12-26 NOTE — TELEPHONE ENCOUNTER
----- Message from Cristin Napoles sent at 12/26/2019  9:31 AM CST -----  Contact: pt   Please call pt at [phone]    Patient would like to have an appt on 01/06/20 instead of 01/02/20 due to transportation issues    Thank you

## 2020-01-02 ENCOUNTER — OFFICE VISIT (OUTPATIENT)
Dept: PODIATRY | Facility: CLINIC | Age: 70
End: 2020-01-02
Payer: MEDICARE

## 2020-01-02 VITALS
HEIGHT: 66 IN | DIASTOLIC BLOOD PRESSURE: 66 MMHG | WEIGHT: 293 LBS | SYSTOLIC BLOOD PRESSURE: 127 MMHG | HEART RATE: 83 BPM | BODY MASS INDEX: 47.09 KG/M2

## 2020-01-02 DIAGNOSIS — L84 CORN OR CALLUS: ICD-10-CM

## 2020-01-02 DIAGNOSIS — B35.1 ONYCHOMYCOSIS DUE TO DERMATOPHYTE: ICD-10-CM

## 2020-01-02 DIAGNOSIS — E11.42 DIABETIC POLYNEUROPATHY ASSOCIATED WITH TYPE 2 DIABETES MELLITUS: Primary | ICD-10-CM

## 2020-01-02 PROCEDURE — 11721 DEBRIDE NAIL 6 OR MORE: CPT | Mod: 59,Q9,S$PBB, | Performed by: PODIATRIST

## 2020-01-02 PROCEDURE — 99999 PR PBB SHADOW E&M-EST. PATIENT-LVL III: CPT | Mod: PBBFAC,,, | Performed by: PODIATRIST

## 2020-01-02 PROCEDURE — 1159F PR MEDICATION LIST DOCUMENTED IN MEDICAL RECORD: ICD-10-PCS | Mod: ,,, | Performed by: PODIATRIST

## 2020-01-02 PROCEDURE — 99213 OFFICE O/P EST LOW 20 MIN: CPT | Mod: PBBFAC | Performed by: PODIATRIST

## 2020-01-02 PROCEDURE — 11056 PARNG/CUTG B9 HYPRKR LES 2-4: CPT | Mod: Q9,S$PBB,, | Performed by: PODIATRIST

## 2020-01-02 PROCEDURE — 99213 OFFICE O/P EST LOW 20 MIN: CPT | Mod: 25,S$PBB,, | Performed by: PODIATRIST

## 2020-01-02 PROCEDURE — 1159F MED LIST DOCD IN RCRD: CPT | Mod: ,,, | Performed by: PODIATRIST

## 2020-01-02 PROCEDURE — 1126F PR PAIN SEVERITY QUANTIFIED, NO PAIN PRESENT: ICD-10-PCS | Mod: ,,, | Performed by: PODIATRIST

## 2020-01-02 PROCEDURE — 99213 PR OFFICE/OUTPT VISIT, EST, LEVL III, 20-29 MIN: ICD-10-PCS | Mod: 25,S$PBB,, | Performed by: PODIATRIST

## 2020-01-02 PROCEDURE — 11056 PARNG/CUTG B9 HYPRKR LES 2-4: CPT | Mod: PBBFAC | Performed by: PODIATRIST

## 2020-01-02 PROCEDURE — 11721 PR DEBRIDEMENT OF NAILS, 6 OR MORE: ICD-10-PCS | Mod: 59,Q9,S$PBB, | Performed by: PODIATRIST

## 2020-01-02 PROCEDURE — 11721 DEBRIDE NAIL 6 OR MORE: CPT | Mod: PBBFAC | Performed by: PODIATRIST

## 2020-01-02 PROCEDURE — 11056 PR TRIM BENIGN HYPERKERATOTIC SKIN LESION,2-4: ICD-10-PCS | Mod: Q9,S$PBB,, | Performed by: PODIATRIST

## 2020-01-02 PROCEDURE — 99999 PR PBB SHADOW E&M-EST. PATIENT-LVL III: ICD-10-PCS | Mod: PBBFAC,,, | Performed by: PODIATRIST

## 2020-01-02 PROCEDURE — 1126F AMNT PAIN NOTED NONE PRSNT: CPT | Mod: ,,, | Performed by: PODIATRIST

## 2020-01-06 ENCOUNTER — OFFICE VISIT (OUTPATIENT)
Dept: INTERNAL MEDICINE | Facility: CLINIC | Age: 70
End: 2020-01-06
Payer: MEDICARE

## 2020-01-06 VITALS
SYSTOLIC BLOOD PRESSURE: 128 MMHG | BODY MASS INDEX: 48.82 KG/M2 | DIASTOLIC BLOOD PRESSURE: 65 MMHG | HEART RATE: 94 BPM | WEIGHT: 293 LBS | HEIGHT: 65 IN | OXYGEN SATURATION: 98 %

## 2020-01-06 DIAGNOSIS — I10 ESSENTIAL HYPERTENSION: ICD-10-CM

## 2020-01-06 DIAGNOSIS — N18.4 ANEMIA OF CHRONIC RENAL FAILURE, STAGE 4 (SEVERE): ICD-10-CM

## 2020-01-06 DIAGNOSIS — E66.01 MORBID OBESITY WITH BMI OF 50.0-59.9, ADULT: ICD-10-CM

## 2020-01-06 DIAGNOSIS — K59.00 CONSTIPATION, UNSPECIFIED CONSTIPATION TYPE: ICD-10-CM

## 2020-01-06 DIAGNOSIS — E11.59 OBESITY, DIABETES, AND HYPERTENSION SYNDROME: ICD-10-CM

## 2020-01-06 DIAGNOSIS — M47.816 LUMBAR FACET ARTHROPATHY: ICD-10-CM

## 2020-01-06 DIAGNOSIS — M48.061 SPINAL STENOSIS OF LUMBAR REGION, UNSPECIFIED WHETHER NEUROGENIC CLAUDICATION PRESENT: ICD-10-CM

## 2020-01-06 DIAGNOSIS — E11.42 TYPE 2 DIABETES MELLITUS WITH DIABETIC POLYNEUROPATHY, WITH LONG-TERM CURRENT USE OF INSULIN: ICD-10-CM

## 2020-01-06 DIAGNOSIS — N25.81 SECONDARY HYPERPARATHYROIDISM: ICD-10-CM

## 2020-01-06 DIAGNOSIS — N18.30 STAGE 3 CHRONIC KIDNEY DISEASE: ICD-10-CM

## 2020-01-06 DIAGNOSIS — E11.8 TYPE 2 DIABETES MELLITUS WITH COMPLICATION, WITH LONG-TERM CURRENT USE OF INSULIN: Primary | ICD-10-CM

## 2020-01-06 DIAGNOSIS — D63.1 ANEMIA OF CHRONIC RENAL FAILURE, STAGE 4 (SEVERE): ICD-10-CM

## 2020-01-06 DIAGNOSIS — E78.2 MIXED HYPERLIPIDEMIA: ICD-10-CM

## 2020-01-06 DIAGNOSIS — Z79.4 TYPE 2 DIABETES MELLITUS WITH DIABETIC POLYNEUROPATHY, WITH LONG-TERM CURRENT USE OF INSULIN: ICD-10-CM

## 2020-01-06 DIAGNOSIS — E11.69 OBESITY, DIABETES, AND HYPERTENSION SYNDROME: ICD-10-CM

## 2020-01-06 DIAGNOSIS — E11.43 DIABETIC AUTONOMIC NEUROPATHY ASSOCIATED WITH TYPE 2 DIABETES MELLITUS: ICD-10-CM

## 2020-01-06 DIAGNOSIS — E66.9 OBESITY, DIABETES, AND HYPERTENSION SYNDROME: ICD-10-CM

## 2020-01-06 DIAGNOSIS — I15.2 OBESITY, DIABETES, AND HYPERTENSION SYNDROME: ICD-10-CM

## 2020-01-06 DIAGNOSIS — Z79.4 TYPE 2 DIABETES MELLITUS WITH COMPLICATION, WITH LONG-TERM CURRENT USE OF INSULIN: Primary | ICD-10-CM

## 2020-01-06 DIAGNOSIS — I73.9 PAD (PERIPHERAL ARTERY DISEASE): Chronic | ICD-10-CM

## 2020-01-06 PROBLEM — N17.9 AKI (ACUTE KIDNEY INJURY): Status: RESOLVED | Noted: 2019-05-23 | Resolved: 2020-01-06

## 2020-01-06 PROBLEM — E11.10 DIABETIC KETOACIDOSIS WITHOUT COMA ASSOCIATED WITH TYPE 2 DIABETES MELLITUS: Status: RESOLVED | Noted: 2019-05-23 | Resolved: 2020-01-06

## 2020-01-06 PROCEDURE — 99214 OFFICE O/P EST MOD 30 MIN: CPT | Mod: S$PBB,,, | Performed by: INTERNAL MEDICINE

## 2020-01-06 PROCEDURE — 99214 PR OFFICE/OUTPT VISIT, EST, LEVL IV, 30-39 MIN: ICD-10-PCS | Mod: S$PBB,,, | Performed by: INTERNAL MEDICINE

## 2020-01-06 PROCEDURE — 99999 PR PBB SHADOW E&M-EST. PATIENT-LVL III: CPT | Mod: PBBFAC,,, | Performed by: INTERNAL MEDICINE

## 2020-01-06 PROCEDURE — 1159F MED LIST DOCD IN RCRD: CPT | Mod: ,,, | Performed by: INTERNAL MEDICINE

## 2020-01-06 PROCEDURE — 99999 PR PBB SHADOW E&M-EST. PATIENT-LVL III: ICD-10-PCS | Mod: PBBFAC,,, | Performed by: INTERNAL MEDICINE

## 2020-01-06 PROCEDURE — 99213 OFFICE O/P EST LOW 20 MIN: CPT | Mod: PBBFAC | Performed by: INTERNAL MEDICINE

## 2020-01-06 PROCEDURE — 1126F AMNT PAIN NOTED NONE PRSNT: CPT | Mod: ,,, | Performed by: INTERNAL MEDICINE

## 2020-01-06 PROCEDURE — 1159F PR MEDICATION LIST DOCUMENTED IN MEDICAL RECORD: ICD-10-PCS | Mod: ,,, | Performed by: INTERNAL MEDICINE

## 2020-01-06 PROCEDURE — 1126F PR PAIN SEVERITY QUANTIFIED, NO PAIN PRESENT: ICD-10-PCS | Mod: ,,, | Performed by: INTERNAL MEDICINE

## 2020-01-06 RX ORDER — AMLODIPINE BESYLATE 10 MG/1
10 TABLET ORAL DAILY
Qty: 90 TABLET | Refills: 3 | Status: SHIPPED | OUTPATIENT
Start: 2020-01-06 | End: 2021-02-11 | Stop reason: SDUPTHER

## 2020-01-06 NOTE — PROGRESS NOTES
Subjective:      Patient ID: Annika Mac is a 69 y.o. female.    Chief Complaint: Diabetic Foot Exam (09/04/2019 dr mamie cotton) and Diabetes Mellitus    Annika is a 69 y.o. female who presents to the clinic for evaluation and treatment of high risk feet. Annika has a past medical history of Allergy, Anemia (7/27/2012), Arthritis, CHF (congestive heart failure), CKD (chronic kidney disease) stage 3, GFR 30-59 ml/min (7/27/2012), Clotting disorder, Colon polyp, Deep vein thrombophlebitis of left leg (7/27/2012), Degenerative disc disease, Diabetic peripheral neuropathy associated with type 2 diabetes mellitus (7/27/2012), GERD (gastroesophageal reflux disease), HTN (hypertension) (7/27/2012), Hyperlipidemia (7/27/2012), Lead-induced gout of ankle or foot, Nonproliferative diabetic retinopathy of right eye (7/27/2012), Obstructive sleep apnea (7/27/2012), Osteoporosis, Proliferative diabetic retinopathy of left eye (7/27/2012), Stroke (8/1/2003), Type 2 diabetes mellitus with diabetic polyneuropathy (7/27/2012), and Ulcer. The patient's chief complaint is long, thick toenails and calluses on both feet . No other major pedal complaintsThis patient has documented high risk feet requiring routine maintenance secondary to diabetes mellitis and those secondary complications of diabetes, as mentioned..  She has had an increase in neuropathy pain bilaterally and is in need of a new prescription for diabetic shoe gear as well as diabetic foot evaluation and routine care..       PCP: Mamie Cotton MD    Date Last Seen by PCP:       Chief Complaint   Patient presents with    Diabetic Foot Exam     09/04/2019 dr mamie cotton    Diabetes Mellitus       Current shoe gear: black leather DM shoes w/ custom inserts     Hemoglobin A1C   Date Value Ref Range Status   11/13/2019 8.2 (H) 4.0 - 5.6 % Final     Comment:     ADA Screening Guidelines:  5.7-6.4%  Consistent with prediabetes  >or=6.5%  Consistent with  diabetes  High levels of fetal hemoglobin interfere with the HbA1C  assay. Heterozygous hemoglobin variants (HbS, HgC, etc)do  not significantly interfere with this assay.   However, presence of multiple variants may affect accuracy.     08/05/2019 8.8 (H) 4.0 - 5.6 % Final     Comment:     ADA Screening Guidelines:  5.7-6.4%  Consistent with prediabetes  >or=6.5%  Consistent with diabetes  High levels of fetal hemoglobin interfere with the HbA1C  assay. Heterozygous hemoglobin variants (HbS, HgC, etc)do  not significantly interfere with this assay.   However, presence of multiple variants may affect accuracy.     05/01/2019 9.9 (H) 4.0 - 5.6 % Final     Comment:     ADA Screening Guidelines:  5.7-6.4%  Consistent with prediabetes  >or=6.5%  Consistent with diabetes  High levels of fetal hemoglobin interfere with the HbA1C  assay. Heterozygous hemoglobin variants (HbS, HgC, etc)do  not significantly interfere with this assay.   However, presence of multiple variants may affect accuracy.         Review of Systems   Constitution: Negative for chills, decreased appetite and fever.   Cardiovascular: Negative for leg swelling.   Skin: Positive for dry skin and nail changes. Negative for color change, flushing, itching, poor wound healing, rash and skin cancer.   Musculoskeletal: Positive for arthritis. Negative for back pain, gout, joint pain, joint swelling and myalgias.   Gastrointestinal: Negative for nausea and vomiting.   Neurological: Positive for numbness. Negative for loss of balance and paresthesias.           Objective:      Physical Exam   Constitutional: She is oriented to person, place, and time. She appears well-developed and well-nourished. No distress.   Cardiovascular:   Dorsalis pedis and posterior tibial pulses are diminished Bilaterally. Toes are cool to touch. Feet are warm proximally.There is decreased digital hair. Skin is atrophic, slightly hyperpigmented, and mildly edematous        Musculoskeletal: Normal range of motion. She exhibits no edema, tenderness or deformity.   Equinus noted b/l ankles, gastroc. Adequate joint range of motion without pain, limitation, nor crepitation Bilateral pedal joints. Muscle strength is 5/5 in all groups bilaterally.        Neurological: She is alert and oriented to person, place, and time.   Bend-Vincent 5.07 monofilamant testing is diminished [place] feet. Sharp/dull sensation diminished Bilaterally. Light touch absent Bilaterally.       Skin: Skin is warm, dry and intact. No abrasion, no bruising, no burn, no ecchymosis, no laceration, no lesion, no petechiae and no rash noted. She is not diaphoretic. No pallor.   Nails 1-5 b/l  are elongated by  2-6 mm's, thickened by 3-5 mm's, dystrophic, and are darkened in  coloration . Xerosis Bilaterally. No open lesions noted.      Hyperkeratotic tissue noted to distal 2-4 toe tips b/l.   Psychiatric: She has a normal mood and affect. Thought content normal.   Nursing note and vitals reviewed.            Assessment:       Encounter Diagnoses   Name Primary?    Diabetic polyneuropathy associated with type 2 diabetes mellitus Yes    Corn or callus     Onychomycosis due to dermatophyte          Plan:       Annika was seen today for diabetic foot exam and diabetes mellitus.    Diagnoses and all orders for this visit:    Diabetic polyneuropathy associated with type 2 diabetes mellitus    Corn or callus    Onychomycosis due to dermatophyte          Routine Foot Care    Performed by:  Jean Carlos Pacheco. DPM  Authorized by:  Patient     Consent Done?:  Yes (Verbal)     Nail Care Type:  Debride  Location(s): All  (Left 1st Toe, Left 3rd Toe, Left 2nd Toe, Left 4th Toe, Left 5th Toe, Right 1st Toe, Right 2nd Toe, Right 3rd Toe, Right 4th Toe and Right 5th Toe)  Patient tolerance:  Patient tolerated the procedure well with no immediate complications     With patient's permission, the toenails mentioned above were aggressively  reduced and debrided using a nail nipper, removing all offending nail and debris. The patient will continue to monitor the areas daily, inspect the feet, wear protective shoe gear when ambulatory, and moisturizer to maintain skin integrity.      Callus Care Type: Debride    With patient's permission, the calluses/hyperkeratotic lesions mentioned above were aggressively reduced and debrided using a number 15 blade. The patient will continue to monitor the areas daily, inspect the feet, wear protective shoe gear when ambulatory, and moisturizer to maintain skin integrity.       I counseled the patient on her conditions, their implications and medical management.        - Shoe inspection. Diabetic Foot Education. Patient reminded of the importance of good nutrition and blood sugar control to help prevent podiatric complications of diabetes. Patient instructed on proper foot hygeine. We discussed wearing proper shoe gear, daily foot inspections, never walking without protective shoe gear, never putting sharp instruments to feet, routine podiatric nail visits every 3 months.

## 2020-01-07 NOTE — PROGRESS NOTES
"Subjective:       Patient ID: Annika Mac is a 69 y.o. female.    Chief Complaint: Follow-up  69 year old presents for follow up  She has been following with endcrinology reports taking her 70 /30 now with ozempic sample  She has had better blood sugar since that time and trying to watch her diet as well a bit more she has been through diabetic education  As well. She has had improvement in her left wrist healing over time still some discomfort or swelling on occaison  She has had constipation at times gets gassy on her pain medication was wondering what would help    HPI  Review of Systems   Respiratory: Negative for chest tightness.    Musculoskeletal: Positive for arthralgias and back pain.       Objective:     Blood pressure 128/65, pulse 94, height 5' 5" (1.651 m), weight (!) 138.1 kg (304 lb 7.3 oz), SpO2 98 %.    Physical Exam   Constitutional: No distress.   HENT:   Head: Normocephalic.   Mouth/Throat: Oropharynx is clear and moist.   Eyes: No scleral icterus.   Neck: Neck supple.   Cardiovascular: Normal rate and regular rhythm. Exam reveals no gallop and no friction rub.   Murmur heard.  Pulmonary/Chest: Effort normal and breath sounds normal. No respiratory distress.   Abdominal: Soft. Bowel sounds are normal. She exhibits no mass. There is no tenderness.   Musculoskeletal: She exhibits no edema.   Left wrist improved rom    Neurological: She is alert.   Skin: No erythema.   Psychiatric: She has a normal mood and affect.   Vitals reviewed.      Assessment:       1. Type 2 diabetes mellitus with complication, with long-term current use of insulin    2. Mixed hyperlipidemia    3. Lumbar facet arthropathy    4. Spinal stenosis of lumbar region, unspecified whether neurogenic claudication present    5. Stage 3 chronic kidney disease    6. Diabetic autonomic neuropathy associated with type 2 diabetes mellitus    7. Anemia of chronic renal failure, stage 4 (severe)    8. Type 2 diabetes mellitus with diabetic " polyneuropathy, with long-term current use of insulin    9. Constipation, unspecified constipation type    10. Secondary hyperparathyroidism    11. Essential hypertension    12. Obesity, diabetes, and hypertension syndrome    13. PAD (peripheral artery disease)    14. Morbid obesity with BMI of 50.0-59.9, adult    15. Uncontrolled type 2 diabetes mellitus with both eyes affected by proliferative retinopathy without macular edema, with long-term current use of insulin        Plan:       Annika was seen today for follow-up.    Diagnoses and all orders for this visit:      Mixed hyperlipidemia  Continue choelsterol medicaton tolerating without difficulty     Lumbar facet arthropathy  Spinal stenosis of lumbar region, unspecified whether neurogenic claudication present  Continues to follow with pain management on tramadol following with physical medicien  Discussed colace for stool softerner and miralax     Stage 3 chronic kidney disease  Stable follows with nephrology     Diabetic autonomic neuropathy associated with type 2 diabetes mellitus  Stable conintues to follow with podiatry remains on cymbalta     Anemia of chronic renal failure, stage 4 (severe)    Type 2 diabetes mellitus with diabetic polyneuropathy, with long-term current use of insulin  She is following with endocrine now on high dose 70/30 and ozempic she reports  hgaic some impromvent over time     Constipation, unspecified constipation type  Discussed stool softener colace with her pain meds and miralax if needed     Secondary hyperparathyroidism  Hx of stable     Essential hypertension  bp acceptable has been imiproved control     Obesity, diabetes, and hypertension syndrome    PAD (peripheral artery disease)  Hx of she conitnues to follow with cardiology   Continue statin   She is following with podiatry     Morbid obesity with BMI of 50.0-59.9, adult    Uncontrolled type 2 diabetes mellitus with both eyes affected by proliferative retinopathy without  macular edema, with long-term current use of insulin  She is up to date on her eye exam     Refill proived   -     amLODIPine (NORVASC) 10 MG tablet; Take 1 tablet (10 mg total) by mouth once daily.    Follow up 4 months

## 2020-01-12 ENCOUNTER — HOSPITAL ENCOUNTER (EMERGENCY)
Facility: HOSPITAL | Age: 70
Discharge: HOME | End: 2020-01-12
Attending: EMERGENCY MEDICINE
Payer: MEDICARE

## 2020-01-12 ENCOUNTER — NURSE TRIAGE (OUTPATIENT)
Dept: ADMINISTRATIVE | Facility: CLINIC | Age: 70
End: 2020-01-12

## 2020-01-12 VITALS
WEIGHT: 293 LBS | HEIGHT: 65 IN | OXYGEN SATURATION: 98 % | BODY MASS INDEX: 48.82 KG/M2 | DIASTOLIC BLOOD PRESSURE: 67 MMHG | RESPIRATION RATE: 18 BRPM | SYSTOLIC BLOOD PRESSURE: 142 MMHG | TEMPERATURE: 98 F | HEART RATE: 76 BPM

## 2020-01-12 DIAGNOSIS — K59.00 CONSTIPATION, UNSPECIFIED CONSTIPATION TYPE: ICD-10-CM

## 2020-01-12 DIAGNOSIS — R10.9 ABDOMINAL PAIN, UNSPECIFIED ABDOMINAL LOCATION: Primary | ICD-10-CM

## 2020-01-12 LAB
ALBUMIN SERPL BCP-MCNC: 4.2 G/DL (ref 3.5–5.2)
ALP SERPL-CCNC: 131 U/L (ref 55–135)
ALT SERPL W/O P-5'-P-CCNC: 23 U/L (ref 10–44)
ANION GAP SERPL CALC-SCNC: 11 MMOL/L (ref 8–16)
AST SERPL-CCNC: 34 U/L (ref 10–40)
BASOPHILS # BLD AUTO: 0.03 K/UL (ref 0–0.2)
BASOPHILS NFR BLD: 0.6 % (ref 0–1.9)
BILIRUB SERPL-MCNC: 0.4 MG/DL (ref 0.1–1)
BUN SERPL-MCNC: 30 MG/DL (ref 8–23)
CALCIUM SERPL-MCNC: 10.3 MG/DL (ref 8.7–10.5)
CHLORIDE SERPL-SCNC: 101 MMOL/L (ref 95–110)
CO2 SERPL-SCNC: 26 MMOL/L (ref 23–29)
CREAT SERPL-MCNC: 1.7 MG/DL (ref 0.5–1.4)
DIFFERENTIAL METHOD: ABNORMAL
EOSINOPHIL # BLD AUTO: 0.3 K/UL (ref 0–0.5)
EOSINOPHIL NFR BLD: 4.9 % (ref 0–8)
ERYTHROCYTE [DISTWIDTH] IN BLOOD BY AUTOMATED COUNT: 16.1 % (ref 11.5–14.5)
EST. GFR  (AFRICAN AMERICAN): 34.7 ML/MIN/1.73 M^2
EST. GFR  (NON AFRICAN AMERICAN): 30.1 ML/MIN/1.73 M^2
GLUCOSE SERPL-MCNC: 228 MG/DL (ref 70–110)
HCT VFR BLD AUTO: 33.2 % (ref 37–48.5)
HGB BLD-MCNC: 10.2 G/DL (ref 12–16)
IMM GRANULOCYTES # BLD AUTO: 0.01 K/UL (ref 0–0.04)
IMM GRANULOCYTES NFR BLD AUTO: 0.2 % (ref 0–0.5)
LIPASE SERPL-CCNC: 27 U/L (ref 4–60)
LYMPHOCYTES # BLD AUTO: 1.4 K/UL (ref 1–4.8)
LYMPHOCYTES NFR BLD: 27.5 % (ref 18–48)
MCH RBC QN AUTO: 27.6 PG (ref 27–31)
MCHC RBC AUTO-ENTMCNC: 30.7 G/DL (ref 32–36)
MCV RBC AUTO: 90 FL (ref 82–98)
MONOCYTES # BLD AUTO: 0.5 K/UL (ref 0.3–1)
MONOCYTES NFR BLD: 10 % (ref 4–15)
NEUTROPHILS # BLD AUTO: 2.9 K/UL (ref 1.8–7.7)
NEUTROPHILS NFR BLD: 56.8 % (ref 38–73)
NRBC BLD-RTO: 0 /100 WBC
PLATELET # BLD AUTO: 266 K/UL (ref 150–350)
PMV BLD AUTO: 12 FL (ref 9.2–12.9)
POTASSIUM SERPL-SCNC: 4.6 MMOL/L (ref 3.5–5.1)
PROT SERPL-MCNC: 7.4 G/DL (ref 6–8.4)
RBC # BLD AUTO: 3.69 M/UL (ref 4–5.4)
SODIUM SERPL-SCNC: 138 MMOL/L (ref 136–145)
WBC # BLD AUTO: 5.09 K/UL (ref 3.9–12.7)

## 2020-01-12 PROCEDURE — 85025 COMPLETE CBC W/AUTO DIFF WBC: CPT

## 2020-01-12 PROCEDURE — 25000003 PHARM REV CODE 250: Performed by: PHYSICIAN ASSISTANT

## 2020-01-12 PROCEDURE — 83690 ASSAY OF LIPASE: CPT

## 2020-01-12 PROCEDURE — 99284 PR EMERGENCY DEPT VISIT,LEVEL IV: ICD-10-PCS | Mod: ,,, | Performed by: PHYSICIAN ASSISTANT

## 2020-01-12 PROCEDURE — 80053 COMPREHEN METABOLIC PANEL: CPT

## 2020-01-12 PROCEDURE — 99284 EMERGENCY DEPT VISIT MOD MDM: CPT | Mod: ,,, | Performed by: PHYSICIAN ASSISTANT

## 2020-01-12 PROCEDURE — 63600175 PHARM REV CODE 636 W HCPCS: Performed by: PHYSICIAN ASSISTANT

## 2020-01-12 PROCEDURE — 99284 EMERGENCY DEPT VISIT MOD MDM: CPT | Mod: 25

## 2020-01-12 RX ORDER — SYRING-NEEDL,DISP,INSUL,0.3 ML 29 G X1/2"
296 SYRINGE, EMPTY DISPOSABLE MISCELLANEOUS
Status: COMPLETED | OUTPATIENT
Start: 2020-01-12 | End: 2020-01-12

## 2020-01-12 RX ORDER — PSEUDOEPHEDRINE/ACETAMINOPHEN 30MG-500MG
100 TABLET ORAL
Status: COMPLETED | OUTPATIENT
Start: 2020-01-12 | End: 2020-01-12

## 2020-01-12 RX ORDER — HYOSCYAMINE SULFATE 0.12 MG/1
0.12 TABLET SUBLINGUAL EVERY 4 HOURS PRN
Qty: 12 TABLET | Refills: 0 | Status: SHIPPED | OUTPATIENT
Start: 2020-01-12 | End: 2020-02-12

## 2020-01-12 RX ORDER — ACETAMINOPHEN 500 MG
1000 TABLET ORAL
Status: COMPLETED | OUTPATIENT
Start: 2020-01-12 | End: 2020-01-12

## 2020-01-12 RX ADMIN — MAGNESIUM CITRATE 296 ML: 1.75 LIQUID ORAL at 01:01

## 2020-01-12 RX ADMIN — Medication 500 ML: at 01:01

## 2020-01-12 RX ADMIN — ACETAMINOPHEN 1000 MG: 500 TABLET ORAL at 11:01

## 2020-01-12 RX ADMIN — Medication 100 ML: at 01:01

## 2020-01-12 NOTE — ED NOTES
Annika Mac, a 69 y.o. female presents to the ED via personal transporation with CC constipation without bowel movement x 4 days.       Patient identifiers verified verbally with patient and correct for Annika Mac.    LOC/ APPEARANCE: The patient is AAOx4. Pt is speaking appropriately, no slurred speech. Pt changed into hospital gown. Continuous cardiac monitor, cont pulse ox, and auto BP cuff applied to patient. Pt is clean and well groomed. No JVD visible. Pt reports pain level of 0. Pt updated on POC. Bed low and locked with side rails up x2, call bell in pt reach.  SKIN: Skin is warm dry and intact, and color is consistent with ethnicity. Capillary refill <3 seconds. No breakdown or brusing visible. Mucus membranes moist, acyanotic.  RESPIRATORY: Airway is open and patent. Respirations-spontaneous, unlabored, regular rate, equal bilaterally on inspiration and expiration. No accessory muscle use noted. Lungs clear to auscultation in all fields bilaterally anterior and posterior.   CARDIAC: Patient has regular heart rate.  No peripheral edema noted, and patient has no c/o chest pain. Peripheral pulses present equal and strong throughout.  ABDOMEN: Soft and tender to palpation to upper quadrant with no distention noted. Normoactive bowel sounds x4 quadrants. Pt has complaints of constipation x 4 days, denies blood in stool. Pt reports normal appetite.   NEUROLOGIC: Eyes open spontaneously and facial expression symmetrical. Pt behavior appropriate to situation, and pt follows commands. Pt reports sensation present in all extremities when touched with a finger, denies any numbness or tingling. PERRLA  MUSCULOSKELETAL: Spontaneous movement noted to all extremities. Hand  equal and leg strength strong +5 bilaterally.   : No complaints of frequency, burning, urgency or blood in the urine. No complaints of incontinence.

## 2020-01-12 NOTE — DISCHARGE INSTRUCTIONS
You should continue to take Miralax once daily until you have regular, soft bowel movement.     Follow up with your primary care provider next week for re-evaluation.    Return to the ED if you have any new or significantly worsening symptoms.

## 2020-01-12 NOTE — ED PROVIDER NOTES
"Encounter Date: 1/12/2020       History     Chief Complaint   Patient presents with    Constipation     abdominal pain, has tried several OTC methods to get relief with out success     69-year-old female with multiple medical comorbidities significant for IDDM type 2, CHF, CKD, history of DVT, HTN, GERD, HLD presents to the ED for evaluation of constipation.  Patient reports no bowel movement for the past 3 days.  Last had a very small bowel movement 4 days ago.  She is still passing gas.  She reports associated periumbilical abdominal pain, chills, sweats. She describes the pain as sharp and like something moving around inside".  She has attempted treatment with MiraLax, stool softener, turmeric without relief. Pt typically has 2 BMs a day. Pt called nursing triage on call and was told to come into the ED. Denies fever, n/v.        Review of patient's allergies indicates:   Allergen Reactions    Iodinated contrast media Rash     Past Medical History:   Diagnosis Date    Allergy     Anemia 7/27/2012    Arthritis     CHF (congestive heart failure)     CKD (chronic kidney disease) stage 3, GFR 30-59 ml/min 7/27/2012    Clotting disorder     Colon polyp     Deep vein thrombophlebitis of left leg 7/27/2012    Degenerative disc disease     Diabetic peripheral neuropathy associated with type 2 diabetes mellitus 7/27/2012    GERD (gastroesophageal reflux disease)     HTN (hypertension) 7/27/2012    Hyperlipidemia 7/27/2012    Lead-induced gout of ankle or foot     right going on three weeks    Nonproliferative diabetic retinopathy of right eye 7/27/2012    Obstructive sleep apnea 7/27/2012    Osteoporosis     Proliferative diabetic retinopathy of left eye 7/27/2012    Stroke 8/1/2003    Type 2 diabetes mellitus with diabetic polyneuropathy 7/27/2012    Ulcer      Past Surgical History:   Procedure Laterality Date    CATARACT EXTRACTION W/  INTRAOCULAR LENS IMPLANT Left 3/2002    left     " CATARACT EXTRACTION W/  INTRAOCULAR LENS IMPLANT Right     OD dr. olivares     SECTION      COLONOSCOPY N/A 2018    Procedure: COLONOSCOPY;  Surgeon: Faizan Mac MD;  Location: [facility] ([address] [place] [address]);  Service: Endoscopy;  Laterality: N/A;    CYST REMOVAL  2011    left side of face    EYE SURGERY Bilateral 2012    eye implants    GANGLION CYST EXCISION  2007    Right wrist    Pan Retinal Photocoagulation Bilateral     Dr. Monterroso (Proliferative Diabetic Retinopathy)    TOTAL ABDOMINAL HYSTERECTOMY  1982    TOTAL KNEE ARTHROPLASTY  2006    left    TRIGGER FINGER RELEASE  2009     Family History   Problem Relation Age of Onset    Diabetes Mother     Hypertension Mother     Heart disease Father     Diabetes Sister     Thyroid disease Brother     Diabetes Brother     Hypertension Brother     No Known Problems Daughter     No Known Problems Son     No Known Problems Daughter     Amblyopia Neg Hx     Blindness Neg Hx     Glaucoma Neg Hx     Macular degeneration Neg Hx     Retinal detachment Neg Hx     Strabismus Neg Hx     Colon cancer Neg Hx     Esophageal cancer Neg Hx      Social History     Tobacco Use    Smoking status: Never Smoker    Smokeless tobacco: Never Used   Substance Use Topics    Alcohol use: No    Drug use: No     Review of Systems   Constitutional: Positive for chills and diaphoresis. Negative for fever.   HENT: Negative for sore throat.    Respiratory: Negative for shortness of breath.    Cardiovascular: Negative for chest pain.   Gastrointestinal: Positive for abdominal pain and constipation. Negative for nausea and vomiting.   Genitourinary: Negative for dysuria.   Musculoskeletal: Negative for back pain.   Skin: Negative for rash.   Neurological: Negative for weakness.   Hematological: Does not bruise/bleed easily.       Physical Exam     Initial Vitals [20 1020]   BP Pulse Resp Temp SpO2   (!) 162/86 86 18 97.9 °F (36.6 °C) 97 %       MAP       --         Physical Exam    Nursing note and vitals reviewed.  Constitutional: She appears well-developed and well-nourished. She is not diaphoretic.  Non-toxic appearance. She does not appear ill. No distress.   Morbidly obese   HENT:   Head: Normocephalic and atraumatic.   Neck: Neck supple.   Cardiovascular: Normal rate and regular rhythm. Exam reveals no gallop and no friction rub.    No murmur heard.  Pulmonary/Chest: Effort normal and breath sounds normal. No accessory muscle usage. No tachypnea. No respiratory distress. She has no decreased breath sounds. She has no wheezes. She has no rhonchi. She has no rales.   Abdominal: Soft. She exhibits no distension. There is tenderness in the periumbilical area, left upper quadrant and left lower quadrant.   Obese abdomen.   Genitourinary:   Genitourinary Comments: FILOMENA: no fecal impaction.  Scant, soft medium brown stool.  Guaiac negative.   Neurological: She is alert.   Skin: No rash noted.   Psychiatric: She has a normal mood and affect. Her behavior is normal.         ED Course   Procedures  Labs Reviewed   CBC W/ AUTO DIFFERENTIAL - Abnormal; Notable for the following components:       Result Value    RBC 3.69 (*)     Hemoglobin 10.2 (*)     Hematocrit 33.2 (*)     Mean Corpuscular Hemoglobin Conc 30.7 (*)     RDW 16.1 (*)     All other components within normal limits   COMPREHENSIVE METABOLIC PANEL - Abnormal; Notable for the following components:    Glucose 228 (*)     BUN, Bld 30 (*)     Creatinine 1.7 (*)     eGFR if  34.7 (*)     eGFR if non  30.1 (*)     All other components within normal limits   LIPASE          Imaging Results          CT Abdomen Pelvis  Without Contrast (Final result)  Result time 01/12/20 12:06:24    Final result by Castillo Diallo MD (01/12/20 12:06:24)                 Impression:      1. No findings to suggest diverticulitis as clinically questioned.  2. Hepatomegaly, correlation with  LFTs advised.  3. Skin thickening involving the flank soft tissues anteriorly, correlation with physical exam recommended.  This may reflect injection sites.  4. Additional findings above.      Electronically signed by: Castillo Diallo MD  Date:    01/12/2020  Time:    12:06             Narrative:    EXAMINATION:  CT ABDOMEN PELVIS WITHOUT CONTRAST    CLINICAL HISTORY:  LLQ pain, suspect diverticulitis;    TECHNIQUE:  Low dose axial images, sagittal and coronal reformations were obtained from the lung bases to the pubic symphysis.  Oral contrast was not administered.    COMPARISON:  None    FINDINGS:  Images of the lower thorax are remarkable for dependent atelectasis.  Mild.    The liver is mildly enlarged, correlation with LFTs recommended.  The spleen, pancreas, gallbladder and right adrenal gland are unremarkable.  There is mild thickening of the left adrenal gland, nonspecific.  There is no biliary dilation or ascites.  The pancreatic duct is not dilated.    There is a low attenuating lesion within the interpolar region of the left kidney measuring 1.7 cm, too small for characterization.  There is no hydronephrosis or nephrolithiasis.  The bilateral ureters are unremarkable without calculi seen.  The urinary bladder is unremarkable.  The uterus is absent the adnexa is unremarkable.    There are a few scattered colonic diverticula without inflammation.  The terminal ileum and appendix/appendiceal stump is unremarkable.  The small bowel is unremarkable.  No focal organized pelvic fluid collection.    Degenerative changes are noted of the spine.  No significant inguinal lymphadenopathy.  There is skin thickening involving the bilateral flank soft tissue, could reflect injection sites.  There is a prominent sclerotic focus within the L3 vertebral body, stable as compared to the previous radiograph.                                 Medical Decision Making:   History:   Old Medical Records: I decided to obtain old  medical records.  Differential Diagnosis:   My differential diagnosis includes but is not limited to:  Constipation, fecal impaction, diverticulitis, bowel obstruction, perforation, malignancy  Clinical Tests:   Lab Tests: Ordered  Radiological Study: Ordered       APC / Resident Notes:   69-year-old female presents to the ED with abdominal pain and constipation x3 days.  Mild hypertension noted at 162/86.  Afebrile.  Vitals otherwise within normal limits. Physical exam as above.    Labs notable for hyperglycemia of 228.  No other concerning electrolyte abnormalities.  Renal function at baseline.  CT a/p reveals no acute intra-abdominal pathology, specifically no evidence of bowel obstruction.  Patient was given a brown bomb enema in the ED and had a large bowel movement. She reports improvement in her symptoms.     I feel that she is stable for discharge.  I will give rx for hyoscyamine for abdominal discomfort.  Advised patient to continue MiraLax daily for bowel regimen.  Close PCP follow-up.  ED return precautions given.  Patient voiced understand and is comfortable with plan.    I have reviewed the patient's records and discussed this case with my supervising physician.  Please be advised that this text was dictated with Active-Semi software and may contain dictation errors.            Attending Attestation:     Physician Attestation Statement for NP/PA:   I discussed this assessment and plan of this patient with the NP/PA, but I did not personally examine the patient. The face to face encounter was performed by the NP/PA.                                Clinical Impression:       ICD-10-CM ICD-9-CM   1. Abdominal pain, unspecified abdominal location R10.9 789.00   2. Constipation, unspecified constipation type K59.00 564.00         Disposition:   Disposition: Discharged  Condition: Stable                     Beryl Mesa PA-C  01/12/20 1610       Ghada Romero MD  01/13/20 2526

## 2020-01-12 NOTE — TELEPHONE ENCOUNTER
"Patient reports she is constipated and has not had a bowel movement in 3 days. Patient denied fever or any other symptoms. Patient was advised per protocol and was advised to call back with symptoms/questions and verbalized understanding.     Reason for Disposition   [1] Abdomen pain is main symptom AND [2] adult female   Pain is mainly in upper abdomen  (if needed ask: "is it mainly above the belly button?")   [1] Constant abdominal pain AND [2] present > 2 hours   Unable to have a bowel movement (BM) without manually removing stool (using finger to pull out stool or perform disimpaction)    Additional Information   Negative: [1] Abdomen pain is main symptom AND [2] adult male   Negative: Constipation in a cancer patient who is currently (or recently) receiving chemotherapy or radiation therapy, or cancer patient who has metastatic or end-stage cancer and is receiving palliative care   Negative: Rectal bleeding or blood in stool is main symptom   Negative: Rectal pain or itching is main symptom   Negative: Shock suspected (e.g., cold/pale/clammy skin, too weak to stand, low BP, rapid pulse)   Negative: Difficult to awaken or acting confused (e.g., disoriented, slurred speech)   Negative: Passed out (i.e., lost consciousness, collapsed and was not responding)   Negative: Sounds like a life-threatening emergency to the triager   Negative: Chest pain   Negative: Followed an abdomen (stomach) injury   Negative: [1] Abdominal pain AND [2] pregnant < 20 weeks   Negative: [1] Abdominal pain AND [2] pregnant > 20 weeks   Negative: [1] Abdominal pain AND [2] postpartum < 1 month since delivery   Negative: Severe difficulty breathing (e.g., struggling for each breath, speaks in single words)   Negative: Shock suspected (e.g., cold/pale/clammy skin, too weak to stand, low BP, rapid pulse)   Negative: Difficult to awaken or acting confused (e.g., disoriented, slurred speech)   Negative: Passed out (i.e., " lost consciousness, collapsed and was not responding)   Negative: Visible sweat on face or sweat dripping down face   Negative: Sounds like a life-threatening emergency to the triager   Negative: Patient sounds very sick or weak to the triager   Negative: [1] Vomiting AND [2] abdomen looks much more swollen than usual   Negative: [1] Vomiting AND [2] contains bile (green color)   Negative: [1] Rectal pain or fullness from fecal impaction (rectum full of stool) AND [2] NOT better after SITZ bath, suppository or enema   Negative: [1] Intermittent mild abdominal pain AND [2] fever   Negative: Abdomen is more swollen than usual   Negative: Last bowel movement (BM) > 4 days ago   Negative: Leaking stool    Protocols used: CONSTIPATION-A-AH, ABDOMINAL PAIN - FEMALE-A-AH, ABDOMINAL PAIN - UPPER-A-AH

## 2020-01-13 ENCOUNTER — TELEPHONE (OUTPATIENT)
Dept: INTERNAL MEDICINE | Facility: CLINIC | Age: 70
End: 2020-01-13

## 2020-01-13 NOTE — TELEPHONE ENCOUNTER
Pt states she is calling to let us know that she was seen in the ER. Pt denies complaints.     Reason for Disposition   [1] Follow-up call to recent contact AND [2] information only call, no triage required    Additional Information   Negative: RN needs further essential information from caller in order to complete triage   Negative: Requesting regular office appointment   Negative: [1] Caller requesting NON-URGENT health information AND [2] PCP's office is the best resource   Negative: Health Information question, no triage required and triager able to answer question   Negative: General information question, no triage required and triager able to answer question   Negative: Question about upcoming scheduled test, no triage required and triager able to answer question   Negative: [1] Caller is not with the adult (patient) AND [2] probable NON-URGENT symptoms    Protocols used: INFORMATION ONLY CALL-A-

## 2020-01-13 NOTE — TELEPHONE ENCOUNTER
----- Message from Hunter Whalen sent at 1/13/2020  1:25 PM CST -----  Contact: Patient [phone]  Sooner appointment than the  can schedule.  Did you offer to schedule the next available appointment and put the patient on the wait list?:    When is the first available appointment: 3/05/20  What is the nature of the appointment: ER Follow up constipation  What visit type: EP  Patient preference of timeframe to be scheduled:    Comments: Patient [phone], requesting within next week    Please call an advise  Thank you

## 2020-01-15 ENCOUNTER — TELEPHONE (OUTPATIENT)
Dept: ENDOCRINOLOGY | Facility: CLINIC | Age: 70
End: 2020-01-15

## 2020-01-15 NOTE — TELEPHONE ENCOUNTER
----- Message from Elizabeth Brower sent at 1/15/2020 10:57 AM CST -----  Contact: self @ [phone]  Pt was advised to call after six weeks concerning the ozempic injections.  Pt says she needs to discuss the dosage.  Pls call.

## 2020-01-20 ENCOUNTER — TELEPHONE (OUTPATIENT)
Dept: ENDOCRINOLOGY | Facility: CLINIC | Age: 70
End: 2020-01-20

## 2020-01-22 ENCOUNTER — OFFICE VISIT (OUTPATIENT)
Dept: INTERNAL MEDICINE | Facility: CLINIC | Age: 70
End: 2020-01-22
Payer: MEDICARE

## 2020-01-22 VITALS
WEIGHT: 293 LBS | OXYGEN SATURATION: 98 % | SYSTOLIC BLOOD PRESSURE: 134 MMHG | HEIGHT: 65 IN | BODY MASS INDEX: 48.82 KG/M2 | HEART RATE: 80 BPM | DIASTOLIC BLOOD PRESSURE: 64 MMHG

## 2020-01-22 DIAGNOSIS — G63 POLYNEUROPATHY ASSOCIATED WITH UNDERLYING DISEASE: ICD-10-CM

## 2020-01-22 DIAGNOSIS — E11.22 TYPE 2 DM WITH CKD STAGE 4 AND HYPERTENSION: ICD-10-CM

## 2020-01-22 DIAGNOSIS — E11.43 DIABETIC AUTONOMIC NEUROPATHY ASSOCIATED WITH TYPE 2 DIABETES MELLITUS: ICD-10-CM

## 2020-01-22 DIAGNOSIS — K59.00 CONSTIPATION, UNSPECIFIED CONSTIPATION TYPE: Primary | ICD-10-CM

## 2020-01-22 DIAGNOSIS — M46.96 UNSPECIFIED INFLAMMATORY SPONDYLOPATHY, LUMBAR REGION: ICD-10-CM

## 2020-01-22 DIAGNOSIS — I12.9 TYPE 2 DM WITH CKD STAGE 4 AND HYPERTENSION: ICD-10-CM

## 2020-01-22 DIAGNOSIS — N18.30 STAGE 3 CHRONIC KIDNEY DISEASE: ICD-10-CM

## 2020-01-22 DIAGNOSIS — N28.9 KIDNEY LESION: ICD-10-CM

## 2020-01-22 DIAGNOSIS — N18.4 TYPE 2 DM WITH CKD STAGE 4 AND HYPERTENSION: ICD-10-CM

## 2020-01-22 DIAGNOSIS — R21 RASH AND NONSPECIFIC SKIN ERUPTION: ICD-10-CM

## 2020-01-22 DIAGNOSIS — L30.9 HAND ECZEMA: ICD-10-CM

## 2020-01-22 DIAGNOSIS — M48.061 SPINAL STENOSIS OF LUMBAR REGION, UNSPECIFIED WHETHER NEUROGENIC CLAUDICATION PRESENT: ICD-10-CM

## 2020-01-22 PROBLEM — G95.9 CERVICAL MYELOPATHY: Status: ACTIVE | Noted: 2020-01-22

## 2020-01-22 PROCEDURE — 99999 PR PBB SHADOW E&M-EST. PATIENT-LVL IV: ICD-10-PCS | Mod: PBBFAC,,, | Performed by: INTERNAL MEDICINE

## 2020-01-22 PROCEDURE — 99214 OFFICE O/P EST MOD 30 MIN: CPT | Mod: S$PBB,,, | Performed by: INTERNAL MEDICINE

## 2020-01-22 PROCEDURE — 1126F AMNT PAIN NOTED NONE PRSNT: CPT | Mod: ,,, | Performed by: INTERNAL MEDICINE

## 2020-01-22 PROCEDURE — 99214 PR OFFICE/OUTPT VISIT, EST, LEVL IV, 30-39 MIN: ICD-10-PCS | Mod: S$PBB,,, | Performed by: INTERNAL MEDICINE

## 2020-01-22 PROCEDURE — 99214 OFFICE O/P EST MOD 30 MIN: CPT | Mod: PBBFAC | Performed by: INTERNAL MEDICINE

## 2020-01-22 PROCEDURE — 99999 PR PBB SHADOW E&M-EST. PATIENT-LVL IV: CPT | Mod: PBBFAC,,, | Performed by: INTERNAL MEDICINE

## 2020-01-22 PROCEDURE — 1159F MED LIST DOCD IN RCRD: CPT | Mod: ,,, | Performed by: INTERNAL MEDICINE

## 2020-01-22 PROCEDURE — 1159F PR MEDICATION LIST DOCUMENTED IN MEDICAL RECORD: ICD-10-PCS | Mod: ,,, | Performed by: INTERNAL MEDICINE

## 2020-01-22 PROCEDURE — 1126F PR PAIN SEVERITY QUANTIFIED, NO PAIN PRESENT: ICD-10-PCS | Mod: ,,, | Performed by: INTERNAL MEDICINE

## 2020-01-22 RX ORDER — NYSTATIN 100000 [USP'U]/G
POWDER TOPICAL 3 TIMES DAILY PRN
Qty: 60 G | Refills: 2 | Status: SHIPPED | OUTPATIENT
Start: 2020-01-22 | End: 2021-01-04 | Stop reason: ALTCHOICE

## 2020-01-22 RX ORDER — HYDROCORTISONE 25 MG/G
CREAM TOPICAL 2 TIMES DAILY PRN
Qty: 28 G | Refills: 1 | Status: SHIPPED | OUTPATIENT
Start: 2020-01-22 | End: 2021-01-04 | Stop reason: ALTCHOICE

## 2020-01-23 NOTE — PROGRESS NOTES
"Subjective:       Patient ID: Annika Mac is a 69 y.o. female.    Chief Complaint: Rash and Constipation  69 year old presents with concern about rash and constiaption. She was recentlhy in er when constipaiton got worse and she has some cramping  She had enema and since has been using stool softener and miralax daily with impromvent now every other day  She has had hermohoid on occasion as well. She would like some cream to use. It started bothering her when strining from constipation  She has also had issues with rash groin where her legs rub and in creases she has used powder in the past would like rx as well    HPI  Review of Systems   Respiratory: Negative for chest tightness and shortness of breath.    Gastrointestinal: Positive for constipation.        Hemorhoid at times    Skin:        Rash   Itching between legs where rub       Objective:     Blood pressure 134/64, pulse 80, height 5' 5" (1.651 m), weight (!) 137.3 kg (302 lb 11.1 oz), SpO2 98 %.    Physical Exam   Constitutional: No distress.   HENT:   Head: Normocephalic.   Mouth/Throat: Oropharynx is clear and moist.   Eyes: No scleral icterus.   Neck: Neck supple.   Cardiovascular: Normal rate and regular rhythm. Exam reveals no gallop and no friction rub.   Murmur heard.  Pulmonary/Chest: Effort normal and breath sounds normal. No respiratory distress.   Abdominal: Soft. Bowel sounds are normal. She exhibits no mass. There is no tenderness.   Musculoskeletal: She exhibits no edema.   Neurological: She is alert.   Speech disturbance    Skin: No erythema.   Dry skin  intertrigional moisture    Vitals reviewed.      Assessment:       1. Constipation, unspecified constipation type    2. Rash and nonspecific skin eruption    3. Hand eczema    4. Kidney lesion    5. Unspecified inflammatory spondylopathy, lumbar region    6. Polyneuropathy associated with underlying disease    7. Diabetic autonomic neuropathy associated with type 2 diabetes mellitus    8. " Spinal stenosis of lumbar region, unspecified whether neurogenic claudication present    9. Stage 3 chronic kidney disease    10. Type 2 DM with CKD stage 4 and hypertension        Plan:       Annika was seen today for rash and constipation.    Diagnoses and all orders for this visit:    Constipation, unspecified constipation type  Recent episode continues stool softener  And miralax daily as needed     Rash and nonspecific skin eruption  Hand eczema  She can use vaseline or aquaphor to protect areas intertigional and rubbing  To keep dry she can use nystatin powder   Discussed dermatology if concerns persist  Avoid scratching     Kidney lesion  Stage 3 ckd  She follows with nerphology   Seen on recent ct  Discussed update us and review  -     US Kidney; Future    Unspecified inflammatory spondylopathy, lumbar region  Polyneuropathy associated with underlying disease  djd lumbar spine with spinal stenosis   Hx of she continues to folllow with physical mediceine     Hemorrhoid at times from straining trial of if needed   -     hydrocortisone (ANUSOL-HC) 2.5 % rectal cream; Place rectally 2 (two) times daily as needed for Hemorrhoids.  -     nystatin (MYCOSTATIN) powder; Apply topically 3 (three) times daily as needed.    Type 2 diabetes she is working with [employer] for improved control on 70/30 and ozempic  [facility]     Follow up 4 months

## 2020-01-30 ENCOUNTER — HOSPITAL ENCOUNTER (OUTPATIENT)
Dept: RADIOLOGY | Facility: HOSPITAL | Age: 70
Discharge: HOME | End: 2020-01-30
Attending: INTERNAL MEDICINE
Payer: MEDICARE

## 2020-01-30 DIAGNOSIS — N28.9 KIDNEY LESION: ICD-10-CM

## 2020-01-30 PROCEDURE — 76770 US EXAM ABDO BACK WALL COMP: CPT | Mod: 26,,, | Performed by: RADIOLOGY

## 2020-01-30 PROCEDURE — 76770 US EXAM ABDO BACK WALL COMP: CPT | Mod: TC

## 2020-01-30 PROCEDURE — 76770 US RETROPERITONEAL COMPLETE: ICD-10-PCS | Mod: 26,,, | Performed by: RADIOLOGY

## 2020-01-31 DIAGNOSIS — N28.89 RENAL MASS: Primary | ICD-10-CM

## 2020-02-06 ENCOUNTER — OFFICE VISIT (OUTPATIENT)
Dept: UROLOGY | Facility: CLINIC | Age: 70
End: 2020-02-06
Payer: MEDICARE

## 2020-02-06 VITALS
BODY MASS INDEX: 48.82 KG/M2 | SYSTOLIC BLOOD PRESSURE: 118 MMHG | HEART RATE: 73 BPM | DIASTOLIC BLOOD PRESSURE: 57 MMHG | HEIGHT: 65 IN | WEIGHT: 293 LBS

## 2020-02-06 DIAGNOSIS — Z88.8 ALLERGY TO IODINE: ICD-10-CM

## 2020-02-06 DIAGNOSIS — N18.4 CKD STAGE 4 DUE TO TYPE 2 DIABETES MELLITUS: ICD-10-CM

## 2020-02-06 DIAGNOSIS — N28.1 BILATERAL RENAL CYSTS: ICD-10-CM

## 2020-02-06 DIAGNOSIS — N28.89 LEFT RENAL MASS: Primary | ICD-10-CM

## 2020-02-06 DIAGNOSIS — E11.22 CKD STAGE 4 DUE TO TYPE 2 DIABETES MELLITUS: ICD-10-CM

## 2020-02-06 PROCEDURE — 99213 OFFICE O/P EST LOW 20 MIN: CPT | Mod: S$PBB,,, | Performed by: NURSE PRACTITIONER

## 2020-02-06 PROCEDURE — 99999 PR PBB SHADOW E&M-EST. PATIENT-LVL V: ICD-10-PCS | Mod: PBBFAC,,, | Performed by: NURSE PRACTITIONER

## 2020-02-06 PROCEDURE — 99213 PR OFFICE/OUTPT VISIT, EST, LEVL III, 20-29 MIN: ICD-10-PCS | Mod: S$PBB,,, | Performed by: NURSE PRACTITIONER

## 2020-02-06 PROCEDURE — 99999 PR PBB SHADOW E&M-EST. PATIENT-LVL V: CPT | Mod: PBBFAC,,, | Performed by: NURSE PRACTITIONER

## 2020-02-06 PROCEDURE — 99215 OFFICE O/P EST HI 40 MIN: CPT | Mod: PBBFAC | Performed by: NURSE PRACTITIONER

## 2020-02-06 NOTE — LETTER
February 6, 2020      Mamie Cotton MD  [address]  [place] [address]           [facility]  [address]  [place] [address]  Phone: [phone]          Patient: Annika Mac   MR Number: [id number]   YOB: 1950   Date of Visit: 2/6/2020       Dear Dr. Mamie Cotton:    Thank you for referring Annika Mac to me for evaluation. Attached you will find relevant portions of my assessment and plan of care.    If you have questions, please do not hesitate to call me. I look forward to following Annika Mac along with you.    Sincerely,    Abbey Quiñones, NP    Enclosure  CC:  No Recipients    If you would like to receive this communication electronically, please contact [email or web address] or [phone] to request more information on [employer] Link access.    For providers and/or their staff who would like to refer a patient to [facility], please contact us through our one-stop-shop provider referral line, [facility], at [phone].    If you feel you have received this communication in error or would no longer like to receive these types of communications, please e-mail [email or web address]

## 2020-02-06 NOTE — PROGRESS NOTES
Subjective:       Patient ID: Annika Mac is a 69 y.o. female.    Chief Complaint: Renal Mass    Annika Mac is a 69 y.o. Diabetic female with CKD who is new to Urology.  She is here today due to an abnormal finding on a US.    2020 Renal US: Interval development of small solid mass density in the left kidney measuring 9 mm.     She voices no complaints of flank pain, hematuria, abdominal pain.  FOS is good for her.  No issues with incontinence      UA on 2020 was clear; no sign of infection    2020 Labs:  BUN  30  Cr   1.7  eGFR  34.7      2019 Hgb A1c was 8.2        Past Medical History:  No date: Allergy  2012: Anemia  No date: Arthritis  No date: CHF (congestive heart failure)  2012: CKD (chronic kidney disease) stage 3, GFR 30-59 ml/min  No date: Clotting disorder  No date: Colon polyp  2012: Deep vein thrombophlebitis of left leg  No date: Degenerative disc disease  2012: Diabetic peripheral neuropathy associated with type 2   diabetes mellitus  No date: GERD (gastroesophageal reflux disease)  2012: HTN (hypertension)  2012: Hyperlipidemia  No date: Lead-induced gout of ankle or foot      Comment:  right going on three weeks  2012: Nonproliferative diabetic retinopathy of right eye  2012: Obstructive sleep apnea  No date: Osteoporosis  2012: Proliferative diabetic retinopathy of left eye  2003: Stroke  2012: Type 2 diabetes mellitus with diabetic polyneuropathy  No date: Ulcer    Past Surgical History:  3/2002: CATARACT EXTRACTION W/  INTRAOCULAR LENS IMPLANT; Left      Comment:  left   : CATARACT EXTRACTION W/  INTRAOCULAR LENS IMPLANT; Right      Comment:  OD dr. olivares  No date:  SECTION  2018: COLONOSCOPY; N/A      Comment:  Procedure: COLONOSCOPY;  Surgeon: Faizan Mac MD;                 Location: [facility] ([address] [place] [address]);  Service: Endoscopy;                 Laterality: N/A;  2011: CYST  REMOVAL      Comment:  left side of face  2012: EYE SURGERY; Bilateral      Comment:  eye implants  11/13/2007: GANGLION CYST EXCISION      Comment:  Right wrist  No date: Pan Retinal Photocoagulation; Bilateral      Comment:  Dr. Monterroso (Proliferative Diabetic Retinopathy)  1982: TOTAL ABDOMINAL HYSTERECTOMY  2/2006: TOTAL KNEE ARTHROPLASTY      Comment:  left  9/18/2009: TRIGGER FINGER RELEASE    Review of patient's family history indicates:  Problem: Diabetes      Relation: Mother          Age of Onset: (Not Specified)  Problem: Hypertension      Relation: Mother          Age of Onset: (Not Specified)  Problem: Heart disease      Relation: Father          Age of Onset: (Not Specified)  Problem: Diabetes      Relation: Sister          Age of Onset: (Not Specified)  Problem: Thyroid disease      Relation: Brother          Age of Onset: (Not Specified)  Problem: Diabetes      Relation: Brother          Age of Onset: (Not Specified)  Problem: Hypertension      Relation: Brother          Age of Onset: (Not Specified)  Problem: No Known Problems      Relation: Daughter          Age of Onset: (Not Specified)  Problem: No Known Problems      Relation: Son          Age of Onset: (Not Specified)  Problem: No Known Problems      Relation: Daughter          Age of Onset: (Not Specified)  Problem: Amblyopia      Relation: Neg Hx          Age of Onset: (Not Specified)  Problem: Blindness      Relation: Neg Hx          Age of Onset: (Not Specified)  Problem: Glaucoma      Relation: Neg Hx          Age of Onset: (Not Specified)  Problem: Macular degeneration      Relation: Neg Hx          Age of Onset: (Not Specified)  Problem: Retinal detachment      Relation: Neg Hx          Age of Onset: (Not Specified)  Problem: Strabismus      Relation: Neg Hx          Age of Onset: (Not Specified)  Problem: Colon cancer      Relation: Neg Hx          Age of Onset: (Not Specified)  Problem: Esophageal cancer      Relation: Neg Hx           "Age of Onset: (Not Specified)      Social History    Socioeconomic History      Marital status:       Spouse name: Heber      Number of children: 3      Years of education: Not on file      Highest education level: Not on file    Occupational History      Not on file    Social Needs      Financial resource strain: Not on file      Food insecurity:        Worry: Not on file        Inability: Not on file      Transportation needs:        Medical: Not on file        Non-medical: Not on file    Tobacco Use      Smoking status: Never Smoker      Smokeless tobacco: Never Used    Substance and Sexual Activity      Alcohol use: No      Drug use: No      Sexual activity: Yes        Partners: Male    Lifestyle      Physical activity:        Days per week: Not on file        Minutes per session: Not on file      Stress: Not on file    Relationships      Social connections:        Talks on phone: Not on file        Gets together: Not on file        Attends Jainism service: Not on file        Active member of club or organization: Not on file        Attends meetings of clubs or organizations: Not on file        Relationship status: Not on file    Other Topics      Concerns:        Not on file    Social History Narrative      , lives with family; 3 children, 2 grandchildren      Allergies:  Iodinated contrast media    Medications:  Current Outpatient Medications:   albuterol (PROAIR HFA) 90 mcg/actuation inhaler, Inhale 2 puffs into the lungs every 6 (six) hours as needed for Wheezing. Rescue, Disp: 18 g, Rfl: 6  allopurinol (ZYLOPRIM) 300 MG tablet, Take 300 mg by mouth once daily. , Disp: , Rfl:   amLODIPine (NORVASC) 10 MG tablet, Take 1 tablet (10 mg total) by mouth once daily., Disp: 90 tablet, Rfl: 3  aspirin 81 MG Chew, Take 1 tablet (81 mg total) by mouth once daily., Disp: , Rfl: 0  BD ULTRA-FINE KIKA PEN NEEDLE 32 gauge x 5/32" Ndle, To use 4 times daily, Disp: 200 each, Rfl: 20  blood sugar " diagnostic Strp, 1 each by Misc.(Non-Drug; Combo Route) route 3 (three) times daily. Dx code  E11.42 . Contour Next, Disp: 120 each, Rfl: 12  blood-glucose meter kit, Use as instructed, true test/result meter, Disp: 1 each, Rfl: 0  blood-glucose meter kit, 1 each by Other route once daily. Use daily. Insurance proffered one touch  E11.42, Disp: 1 each, Rfl: 0  chlorthalidone (HYGROTEN) 25 MG Tab, Take 1 tablet (25 mg total) by mouth once daily., Disp: 30 tablet, Rfl: 11  colchicine (COLCRYS) 0.6 mg tablet, Take 0.6 mg by mouth as needed., Disp: , Rfl:   DULoxetine (CYMBALTA) 30 MG capsule, Take 1 capsule (30 mg total) by mouth once daily., Disp: 90 capsule, Rfl: 3  famotidine (PEPCID) 20 MG tablet, Take 1 tablet (20 mg total) by mouth 2 (two) times daily., Disp: 1 tablet, Rfl: 0  fexofenadine (ALLEGRA) 180 MG tablet, TAKE 1 TABLET BY MOUTH ONCE DAILY, Disp: 30 tablet, Rfl: 0  fluocinonide (LIDEX) 0.05 % external solution, AAA scalp qday - bid prn pruritus, Disp: 60 mL, Rfl: 3  glucagon, human recombinant, (GLUCAGON EMERGENCY KIT, HUMAN,) 1 mg SolR, Inject 1 mg into the muscle as needed., Disp: 1 each, Rfl: 3  hydrocortisone (ANUSOL-HC) 2.5 % rectal cream, Place rectally 2 (two) times daily as needed for Hemorrhoids., Disp: 28 g, Rfl: 1  hyoscyamine (LEVSIN/SL) 0.125 mg Subl, Place 1 tablet (0.125 mg total) under the tongue every 4 (four) hours as needed., Disp: 12 tablet, Rfl: 0  insulin  unit/mL injection, Inject 110 Units into the skin 2 (two) times daily before meals. 110 with lunch, 90 with dinner,  ., Disp: 4 vial, Rfl: 6  insulin NPH-insulin regular, 70/30, (NOVOLIN 70/30 U-100 INSULIN) 100 unit/mL (70-30) injection, INJECT 120 UNITS INTO THE SKIN WITH BREAKFAST, 110 UNITS WITH LUNCH, AND INJECT 90 UNITS WITH DINNER ; , Disp: 30 mL, Rfl: 0  insulin NPH-insulin regular, 70/30, (NOVOLIN 70/30 U-100 INSULIN) 100 unit/mL (70-30) injection, INJECT 120 UNITS INTO THE SKIN WITH  BREAKFAST, 110 UNITS WITH LUNCH, AND INJECT 90 UNITS WITH DINNER ; , Disp: 300 mL, Rfl: 3  insulin NPH-insulin regular, 70/30, (NOVOLIN 70/30) 100 unit/mL (70-30) injection, Breakfast: 100 -110 units Lunch: 80 - 90 units Dinner: 65 - 70 units MAX TDD: 270, Disp: 4 vial, Rfl: 11  insulin regular 100 unit/mL Inj injection, Inject 120 Units into the skin 3 (three) times daily before meals. 120 units with breakfast, 110 units with lunch, and 90 units with dinner; , Disp: , Rfl:   insulin regular 100 unit/mL Inj injection, Inject into the skin 3 (three) times daily before meals., Disp: , Rfl:   irbesartan (AVAPRO) 300 MG tablet, Take 1 tablet (300 mg total) by mouth every evening., Disp: 90 tablet, Rfl: 3  ketoconazole (NIZORAL) 2 % shampoo, Wash hair with medicated shampoo at least 2x/week - let sit on scalp at least 5 minutes prior to rinsing, Disp: 120 mL, Rfl: 5    labetalol (NORMODYNE) 300 MG tablet, Take 1 tablet (300 mg total) by mouth 2 (two) times daily., Disp: 180 tablet, Rfl: 3  lancets 31 gauge Misc, 1 lancet by Misc.(Non-Drug; Combo Route) route 4 (four) times daily. E11.42 . Prescribed one touch, Disp: 200 each, Rfl: 6  lancets 33 gauge Misc, 1 lancet by Misc.(Non-Drug; Combo Route) route 4 (four) times daily. Dx code E11.42, Disp: 200 each, Rfl: 12  multivitamin (THERAGRAN) per tablet, Take 1 tablet by mouth once daily., Disp: , Rfl:   nystatin (MYCOSTATIN) powder, Apply topically 3 (three) times daily as needed., Disp: 60 g, Rfl: 2  pregabalin (LYRICA) 150 MG capsule, Take 1 capsule (150 mg total) by mouth 2 (two) times daily., Disp: 60 capsule, Rfl: 5  promethazine (PHENERGAN) 6.25 mg/5 mL syrup, Take 5 mLs (6.25 mg total) by mouth every 8 (eight) hours as needed for Nausea., Disp: 118 mL, Rfl: 0  semaglutide (OZEMPIC SUBQ), Inject into the skin., Disp: , Rfl:   simvastatin (ZOCOR) 40 MG tablet, Take 1 tablet (40 mg total) by mouth every evening., Disp: 90 tablet, Rfl: 4   sodium bicarbonate 325 MG tablet, Take 2 tablets (650 mg total) by mouth 2 (two) times daily., Disp: 120 tablet, Rfl: 11  triamcinolone acetonide 0.1% (KENALOG) 0.1 % ointment, AAA bid hands, Disp: 60 g, Rfl: 1  traMADol (ULTRAM) 50 mg tablet, Take 1 tablet (50 mg total) by mouth every 8 (eight) hours as needed., Disp: 90 tablet, Rfl: 3          Review of Systems   Constitutional: Negative.  Negative for chills and fever.   HENT: Negative for facial swelling and trouble swallowing.    Eyes: Negative for visual disturbance.   Respiratory: Negative for cough, chest tightness, shortness of breath and wheezing.    Cardiovascular: Negative for chest pain and palpitations.   Gastrointestinal: Positive for constipation. Negative for abdominal pain, nausea and vomiting.        Takes OTC for constipation     Genitourinary: Positive for nocturia. Negative for difficulty urinating, dysuria, hematuria, urgency and vaginal bleeding.        No issues with urination.  Nocturia 1-2x     Musculoskeletal: Positive for gait problem.   Skin: Negative for rash.   Neurological: Positive for weakness. Negative for dizziness and headaches.   Hematological: Does not bruise/bleed easily.   Psychiatric/Behavioral: Negative for behavioral problems.       Objective:      Physical Exam   Constitutional: She is oriented to person, place, and time. Vital signs are normal. She appears well-developed and well-nourished. She is active and cooperative.   HENT:   Head: Normocephalic.   Right Ear: External ear normal.   Left Ear: External ear normal.   Nose: Nose normal.   Eyes: Conjunctivae and lids are normal. Right eye exhibits no discharge. Left eye exhibits no discharge. No scleral icterus.   Neck: Trachea normal and normal range of motion. Neck supple. No tracheal deviation present.   Cardiovascular: Normal rate and intact distal pulses.    Pulmonary/Chest: Effort normal. No respiratory distress.   Abdominal: Soft. She exhibits no distension.  There is no tenderness. There is no guarding and no CVA tenderness.   Musculoskeletal: Normal range of motion. She exhibits no edema.   Neurological: She is alert and oriented to person, place, and time.   Skin: Skin is warm, dry and intact.     Psychiatric: She has a normal mood and affect. Her behavior is normal.       Assessment:       1. Left renal mass    2. Bilateral renal cysts    3. CKD stage 4 due to type 2 diabetes mellitus    4. Allergy to iodine        Plan:         I spent 35 minutes with the patient of which more than half was spent in direct consultation with the patient in regards to our treatment and plan.    Education and recommendations of today's plan of care including home remedies.  We discussed why she was here and what was found on recent imaging.  We discussed that this may be nothing but it also could be something more serious such a kidney cancer.  It was not seen before but now it was noted. Again US and CtRSS(non contrast) is not definitive.  We discussed options of her choice based on her current renal fx.  -surveillance; repeat US  -cysto with RPG so not to hurt renal fx  -contrast CT but she must hydrate well before but especially afterwards to flush out the dye.  She does have an allergy to so we would prepare here with iodine allergy prep before.  Decided to procedure with cysto and (L) RPG with Dr. Rivas.  I explained the procedure as well as the prep. Educational materials provided.  Voiced understanding

## 2020-02-06 NOTE — H&P (VIEW-ONLY)
Subjective:       Patient ID: Annika Mac is a 69 y.o. female.    Chief Complaint: Renal Mass    Annika Mac is a 69 y.o. Diabetic female with CKD who is new to Urology.  She is here today due to an abnormal finding on a US.    2020 Renal US: Interval development of small solid mass density in the left kidney measuring 9 mm.     She voices no complaints of flank pain, hematuria, abdominal pain.  FOS is good for her.  No issues with incontinence      UA on 2020 was clear; no sign of infection    2020 Labs:  BUN  30  Cr   1.7  eGFR  34.7      2019 Hgb A1c was 8.2        Past Medical History:  No date: Allergy  2012: Anemia  No date: Arthritis  No date: CHF (congestive heart failure)  2012: CKD (chronic kidney disease) stage 3, GFR 30-59 ml/min  No date: Clotting disorder  No date: Colon polyp  2012: Deep vein thrombophlebitis of left leg  No date: Degenerative disc disease  2012: Diabetic peripheral neuropathy associated with type 2   diabetes mellitus  No date: GERD (gastroesophageal reflux disease)  2012: HTN (hypertension)  2012: Hyperlipidemia  No date: Lead-induced gout of ankle or foot      Comment:  right going on three weeks  2012: Nonproliferative diabetic retinopathy of right eye  2012: Obstructive sleep apnea  No date: Osteoporosis  2012: Proliferative diabetic retinopathy of left eye  2003: Stroke  2012: Type 2 diabetes mellitus with diabetic polyneuropathy  No date: Ulcer    Past Surgical History:  3/2002: CATARACT EXTRACTION W/  INTRAOCULAR LENS IMPLANT; Left      Comment:  left   : CATARACT EXTRACTION W/  INTRAOCULAR LENS IMPLANT; Right      Comment:  OD dr. olivares  No date:  SECTION  2018: COLONOSCOPY; N/A      Comment:  Procedure: COLONOSCOPY;  Surgeon: Faizan Mac MD;                 Location: [facility] ([address] [place] [address]);  Service: Endoscopy;                 Laterality: N/A;  2011: CYST  REMOVAL      Comment:  left side of face  2012: EYE SURGERY; Bilateral      Comment:  eye implants  11/13/2007: GANGLION CYST EXCISION      Comment:  Right wrist  No date: Pan Retinal Photocoagulation; Bilateral      Comment:  Dr. Monterroso (Proliferative Diabetic Retinopathy)  1982: TOTAL ABDOMINAL HYSTERECTOMY  2/2006: TOTAL KNEE ARTHROPLASTY      Comment:  left  9/18/2009: TRIGGER FINGER RELEASE    Review of patient's family history indicates:  Problem: Diabetes      Relation: Mother          Age of Onset: (Not Specified)  Problem: Hypertension      Relation: Mother          Age of Onset: (Not Specified)  Problem: Heart disease      Relation: Father          Age of Onset: (Not Specified)  Problem: Diabetes      Relation: Sister          Age of Onset: (Not Specified)  Problem: Thyroid disease      Relation: Brother          Age of Onset: (Not Specified)  Problem: Diabetes      Relation: Brother          Age of Onset: (Not Specified)  Problem: Hypertension      Relation: Brother          Age of Onset: (Not Specified)  Problem: No Known Problems      Relation: Daughter          Age of Onset: (Not Specified)  Problem: No Known Problems      Relation: Son          Age of Onset: (Not Specified)  Problem: No Known Problems      Relation: Daughter          Age of Onset: (Not Specified)  Problem: Amblyopia      Relation: Neg Hx          Age of Onset: (Not Specified)  Problem: Blindness      Relation: Neg Hx          Age of Onset: (Not Specified)  Problem: Glaucoma      Relation: Neg Hx          Age of Onset: (Not Specified)  Problem: Macular degeneration      Relation: Neg Hx          Age of Onset: (Not Specified)  Problem: Retinal detachment      Relation: Neg Hx          Age of Onset: (Not Specified)  Problem: Strabismus      Relation: Neg Hx          Age of Onset: (Not Specified)  Problem: Colon cancer      Relation: Neg Hx          Age of Onset: (Not Specified)  Problem: Esophageal cancer      Relation: Neg Hx           "Age of Onset: (Not Specified)      Social History    Socioeconomic History      Marital status:       Spouse name: Heber      Number of children: 3      Years of education: Not on file      Highest education level: Not on file    Occupational History      Not on file    Social Needs      Financial resource strain: Not on file      Food insecurity:        Worry: Not on file        Inability: Not on file      Transportation needs:        Medical: Not on file        Non-medical: Not on file    Tobacco Use      Smoking status: Never Smoker      Smokeless tobacco: Never Used    Substance and Sexual Activity      Alcohol use: No      Drug use: No      Sexual activity: Yes        Partners: Male    Lifestyle      Physical activity:        Days per week: Not on file        Minutes per session: Not on file      Stress: Not on file    Relationships      Social connections:        Talks on phone: Not on file        Gets together: Not on file        Attends Anabaptism service: Not on file        Active member of club or organization: Not on file        Attends meetings of clubs or organizations: Not on file        Relationship status: Not on file    Other Topics      Concerns:        Not on file    Social History Narrative      , lives with family; 3 children, 2 grandchildren      Allergies:  Iodinated contrast media    Medications:  Current Outpatient Medications:   albuterol (PROAIR HFA) 90 mcg/actuation inhaler, Inhale 2 puffs into the lungs every 6 (six) hours as needed for Wheezing. Rescue, Disp: 18 g, Rfl: 6  allopurinol (ZYLOPRIM) 300 MG tablet, Take 300 mg by mouth once daily. , Disp: , Rfl:   amLODIPine (NORVASC) 10 MG tablet, Take 1 tablet (10 mg total) by mouth once daily., Disp: 90 tablet, Rfl: 3  aspirin 81 MG Chew, Take 1 tablet (81 mg total) by mouth once daily., Disp: , Rfl: 0  BD ULTRA-FINE KIKA PEN NEEDLE 32 gauge x 5/32" Ndle, To use 4 times daily, Disp: 200 each, Rfl: 20  blood sugar " diagnostic Strp, 1 each by Misc.(Non-Drug; Combo Route) route 3 (three) times daily. Dx code  E11.42 . Contour Next, Disp: 120 each, Rfl: 12  blood-glucose meter kit, Use as instructed, true test/result meter, Disp: 1 each, Rfl: 0  blood-glucose meter kit, 1 each by Other route once daily. Use daily. Insurance proffered one touch  E11.42, Disp: 1 each, Rfl: 0  chlorthalidone (HYGROTEN) 25 MG Tab, Take 1 tablet (25 mg total) by mouth once daily., Disp: 30 tablet, Rfl: 11  colchicine (COLCRYS) 0.6 mg tablet, Take 0.6 mg by mouth as needed., Disp: , Rfl:   DULoxetine (CYMBALTA) 30 MG capsule, Take 1 capsule (30 mg total) by mouth once daily., Disp: 90 capsule, Rfl: 3  famotidine (PEPCID) 20 MG tablet, Take 1 tablet (20 mg total) by mouth 2 (two) times daily., Disp: 1 tablet, Rfl: 0  fexofenadine (ALLEGRA) 180 MG tablet, TAKE 1 TABLET BY MOUTH ONCE DAILY, Disp: 30 tablet, Rfl: 0  fluocinonide (LIDEX) 0.05 % external solution, AAA scalp qday - bid prn pruritus, Disp: 60 mL, Rfl: 3  glucagon, human recombinant, (GLUCAGON EMERGENCY KIT, HUMAN,) 1 mg SolR, Inject 1 mg into the muscle as needed., Disp: 1 each, Rfl: 3  hydrocortisone (ANUSOL-HC) 2.5 % rectal cream, Place rectally 2 (two) times daily as needed for Hemorrhoids., Disp: 28 g, Rfl: 1  hyoscyamine (LEVSIN/SL) 0.125 mg Subl, Place 1 tablet (0.125 mg total) under the tongue every 4 (four) hours as needed., Disp: 12 tablet, Rfl: 0  insulin  unit/mL injection, Inject 110 Units into the skin 2 (two) times daily before meals. 110 with lunch, 90 with dinner,  ., Disp: 4 vial, Rfl: 6  insulin NPH-insulin regular, 70/30, (NOVOLIN 70/30 U-100 INSULIN) 100 unit/mL (70-30) injection, INJECT 120 UNITS INTO THE SKIN WITH BREAKFAST, 110 UNITS WITH LUNCH, AND INJECT 90 UNITS WITH DINNER ; , Disp: 30 mL, Rfl: 0  insulin NPH-insulin regular, 70/30, (NOVOLIN 70/30 U-100 INSULIN) 100 unit/mL (70-30) injection, INJECT 120 UNITS INTO THE SKIN WITH  BREAKFAST, 110 UNITS WITH LUNCH, AND INJECT 90 UNITS WITH DINNER ; , Disp: 300 mL, Rfl: 3  insulin NPH-insulin regular, 70/30, (NOVOLIN 70/30) 100 unit/mL (70-30) injection, Breakfast: 100 -110 units Lunch: 80 - 90 units Dinner: 65 - 70 units MAX TDD: 270, Disp: 4 vial, Rfl: 11  insulin regular 100 unit/mL Inj injection, Inject 120 Units into the skin 3 (three) times daily before meals. 120 units with breakfast, 110 units with lunch, and 90 units with dinner; , Disp: , Rfl:   insulin regular 100 unit/mL Inj injection, Inject into the skin 3 (three) times daily before meals., Disp: , Rfl:   irbesartan (AVAPRO) 300 MG tablet, Take 1 tablet (300 mg total) by mouth every evening., Disp: 90 tablet, Rfl: 3  ketoconazole (NIZORAL) 2 % shampoo, Wash hair with medicated shampoo at least 2x/week - let sit on scalp at least 5 minutes prior to rinsing, Disp: 120 mL, Rfl: 5    labetalol (NORMODYNE) 300 MG tablet, Take 1 tablet (300 mg total) by mouth 2 (two) times daily., Disp: 180 tablet, Rfl: 3  lancets 31 gauge Misc, 1 lancet by Misc.(Non-Drug; Combo Route) route 4 (four) times daily. E11.42 . Prescribed one touch, Disp: 200 each, Rfl: 6  lancets 33 gauge Misc, 1 lancet by Misc.(Non-Drug; Combo Route) route 4 (four) times daily. Dx code E11.42, Disp: 200 each, Rfl: 12  multivitamin (THERAGRAN) per tablet, Take 1 tablet by mouth once daily., Disp: , Rfl:   nystatin (MYCOSTATIN) powder, Apply topically 3 (three) times daily as needed., Disp: 60 g, Rfl: 2  pregabalin (LYRICA) 150 MG capsule, Take 1 capsule (150 mg total) by mouth 2 (two) times daily., Disp: 60 capsule, Rfl: 5  promethazine (PHENERGAN) 6.25 mg/5 mL syrup, Take 5 mLs (6.25 mg total) by mouth every 8 (eight) hours as needed for Nausea., Disp: 118 mL, Rfl: 0  semaglutide (OZEMPIC SUBQ), Inject into the skin., Disp: , Rfl:   simvastatin (ZOCOR) 40 MG tablet, Take 1 tablet (40 mg total) by mouth every evening., Disp: 90 tablet, Rfl: 4   sodium bicarbonate 325 MG tablet, Take 2 tablets (650 mg total) by mouth 2 (two) times daily., Disp: 120 tablet, Rfl: 11  triamcinolone acetonide 0.1% (KENALOG) 0.1 % ointment, AAA bid hands, Disp: 60 g, Rfl: 1  traMADol (ULTRAM) 50 mg tablet, Take 1 tablet (50 mg total) by mouth every 8 (eight) hours as needed., Disp: 90 tablet, Rfl: 3          Review of Systems   Constitutional: Negative.  Negative for chills and fever.   HENT: Negative for facial swelling and trouble swallowing.    Eyes: Negative for visual disturbance.   Respiratory: Negative for cough, chest tightness, shortness of breath and wheezing.    Cardiovascular: Negative for chest pain and palpitations.   Gastrointestinal: Positive for constipation. Negative for abdominal pain, nausea and vomiting.        Takes OTC for constipation     Genitourinary: Positive for nocturia. Negative for difficulty urinating, dysuria, hematuria, urgency and vaginal bleeding.        No issues with urination.  Nocturia 1-2x     Musculoskeletal: Positive for gait problem.   Skin: Negative for rash.   Neurological: Positive for weakness. Negative for dizziness and headaches.   Hematological: Does not bruise/bleed easily.   Psychiatric/Behavioral: Negative for behavioral problems.       Objective:      Physical Exam   Constitutional: She is oriented to person, place, and time. Vital signs are normal. She appears well-developed and well-nourished. She is active and cooperative.   HENT:   Head: Normocephalic.   Right Ear: External ear normal.   Left Ear: External ear normal.   Nose: Nose normal.   Eyes: Conjunctivae and lids are normal. Right eye exhibits no discharge. Left eye exhibits no discharge. No scleral icterus.   Neck: Trachea normal and normal range of motion. Neck supple. No tracheal deviation present.   Cardiovascular: Normal rate and intact distal pulses.    Pulmonary/Chest: Effort normal. No respiratory distress.   Abdominal: Soft. She exhibits no distension.  There is no tenderness. There is no guarding and no CVA tenderness.   Musculoskeletal: Normal range of motion. She exhibits no edema.   Neurological: She is alert and oriented to person, place, and time.   Skin: Skin is warm, dry and intact.     Psychiatric: She has a normal mood and affect. Her behavior is normal.       Assessment:       1. Left renal mass    2. Bilateral renal cysts    3. CKD stage 4 due to type 2 diabetes mellitus    4. Allergy to iodine        Plan:         I spent 35 minutes with the patient of which more than half was spent in direct consultation with the patient in regards to our treatment and plan.    Education and recommendations of today's plan of care including home remedies.  We discussed why she was here and what was found on recent imaging.  We discussed that this may be nothing but it also could be something more serious such a kidney cancer.  It was not seen before but now it was noted. Again US and CtRSS(non contrast) is not definitive.  We discussed options of her choice based on her current renal fx.  -surveillance; repeat US  -cysto with RPG so not to hurt renal fx  -contrast CT but she must hydrate well before but especially afterwards to flush out the dye.  She does have an allergy to so we would prepare here with iodine allergy prep before.  Decided to procedure with cysto and (L) RPG with Dr. Rivas.  I explained the procedure as well as the prep. Educational materials provided.  Voiced understanding

## 2020-02-06 NOTE — PATIENT INSTRUCTIONS
Cystoscopy    Cystoscopy is a procedure that lets your doctor look directly inside your urethra and bladder. It can be used to:  · Help diagnose a problem with your urethra, bladder, or kidneys.  · Take a sample (biopsy) of bladder or urethral tissue.  · Treat certain problems (such as removing kidney stones).  · Place a stent to bypass an obstruction.  · Take special X-rays of the kidneys.  Based on the findings, your doctor may recommend other tests or treatments.  What is a cystoscope?  A cystoscope is a telescope-like instrument that contains lenses and fiberoptics (small glass wires that make bright light). The cystoscope may be straight and rigid, or flexible to bend around curves in the urethra. The doctor may look directly into the cystoscope, or project the image onto a monitor.  Getting ready  · Ask your doctor if you should stop taking any medicines before the procedure.  · Ask whether you should avoid eating or drinking anything after midnight before the procedure.  · Follow any other instructions your doctor gives you.  Tell your doctor before the exam if you:  · Take any medicines, such as aspirin or blood thinners  · Have allergies to any medicines  · Are pregnant   The procedure  Cystoscopy is done in the doctors office, surgery center, or hospital. The doctor and a nurse are present during the procedure. It takes only a few minutes, longer if a biopsy, X-ray, or treatment needs to be done.  During the procedure:  · You lie on an exam table on your back, knees bent and legs apart. You are covered with a drape.  · Your urethra and the area around it are washed. Anesthetic jelly may be applied to numb the urethra. Other pain medicine is usually not needed. In some cases, you may be offered a mild sedative to help you relax. If a more extensive procedure is to be done, such as a biopsy or kidney stone removal, general anesthesia may be needed.  · The cystoscope is inserted. A sterile fluid is put  into the bladder to expand it. You may feel pressure from this fluid.  · When the procedure is done, the cystoscope is removed.  After the procedure  If you had a sedative, general anesthesia, or spinal anesthesia, you must have someone drive you home. Once youre home:  · Drink plenty of fluids.  · You may have burning or light bleeding when you urinate--this is normal.  · Medicines may be prescribed to ease any discomfort or prevent infection. Take these as directed.  · Call your doctor if you have heavy bleeding or blood clots, burning that lasts more than a day, a fever over 100°F  (38° C), or trouble urinating.  Date Last Reviewed: 1/1/2017 © 2000-2017 [employer]. [address] [place] [address], [place] [address]. All rights reserved. This information is not intended as a substitute for professional medical care. Always follow your healthcare professional's instructions.      Retrograde or anterograde pyelography -- Antegrade or retrograde pyelography has been used to diagnose urinary tract obstruction but has largely been supplanted by ultrasonography and CT scanning. However, pyelography may be indicated when the history is highly suggestive of urinary tract obstruction (eg, unexplained acute kidney injury [GEORGE] with a bland urine sediment in a patient with known pelvic malignancy) and hydronephrosis is absent on ultrasonography and CT scanning due to possible urinary tract encasement [1].  Retrograde studies can also be useful for localizing the obstruction when there is insufficient kidney function to excrete intravenous contrast.

## 2020-02-07 ENCOUNTER — TELEPHONE (OUTPATIENT)
Dept: UROLOGY | Facility: CLINIC | Age: 70
End: 2020-02-07

## 2020-02-07 DIAGNOSIS — Z88.8 ALLERGY TO IODINE: ICD-10-CM

## 2020-02-07 DIAGNOSIS — N28.89 LEFT RENAL MASS: Primary | ICD-10-CM

## 2020-02-07 RX ORDER — PREDNISONE 50 MG/1
TABLET ORAL
Qty: 3 TABLET | Refills: 0 | Status: SHIPPED | OUTPATIENT
Start: 2020-02-07 | End: 2020-05-06

## 2020-02-07 RX ORDER — DIPHENHYDRAMINE HCL 25 MG
CAPSULE ORAL
Qty: 2 CAPSULE | Refills: 0 | Status: SHIPPED | OUTPATIENT
Start: 2020-02-07 | End: 2020-05-06

## 2020-02-07 NOTE — TELEPHONE ENCOUNTER
Spoke with ms briggs to confirm a procedure date on 2-26 with Dr Rivas, she agreed. Sent out instructions/location paperwork today to the address we have on file, she verified it.

## 2020-02-12 ENCOUNTER — TELEPHONE (OUTPATIENT)
Dept: ENDOCRINOLOGY | Facility: CLINIC | Age: 70
End: 2020-02-12

## 2020-02-12 ENCOUNTER — TELEPHONE (OUTPATIENT)
Dept: INTERNAL MEDICINE | Facility: CLINIC | Age: 70
End: 2020-02-12

## 2020-02-12 NOTE — TELEPHONE ENCOUNTER
----- Message from Mamie Cotton MD sent at 2/12/2020 11:46 AM CST -----  Okay to hold asprin 5 days prior if needed  Then resume   Kj   ----- Message -----  From: Magen Hernandez MA  Sent: 2/12/2020   9:53 AM CST  To: Mamie Cotton MD        ----- Message -----  From: Sulma Rosas RN  Sent: 2/12/2020   9:45 AM CST  To: Mamie Cotton MD, #    Pt. Is scheduled for cystoscopy with retrograde pyelogram by Dr. Rivas 2/26/20  Request ASA instructions symone Rosas RN BC  Pre-op anesthesia

## 2020-02-12 NOTE — TELEPHONE ENCOUNTER
I agree with the insulin recommendations to decrease the day of the procedure.   She is okay taking the Ozempic. Received message from  KEMAR Villa NP in absence of Ursula Salinas NP.        Thank you.

## 2020-02-12 NOTE — PRE-PROCEDURE INSTRUCTIONS
Magen Hernandez MA 28 minutes ago (3:30 PM)         ----- Message from Mamie Cotton MD sent at 2/12/2020 11:46 AM CST -----  Okay to hold asprin 5 days prior if needed  Then resume   Kj   ----- Message -----  From: Magen Hernandez MA  Sent: 2/12/2020   9:53 AM CST  To: Mamie Cotton MD           ----- Message -----  From: Sulma Rosas RN  Sent: 2/12/2020   9:45 AM CST  To: Mamie Cottno MD, #     Pt. Is scheduled for cystoscopy with retrograde pyelogram by Dr. Rivas 2/26/20  Request ASA instructions please           SUSSY Rosas RN BC  Pre-op anesthesia

## 2020-02-12 NOTE — PRE-PROCEDURE INSTRUCTIONS
KEMAR Bañuelos MA 19 minutes ago (1:15 PM)      I agree with the insulin recommendations to decrease the day of the procedure.   She is okay taking the Ozempic.     Routing comment

## 2020-02-12 NOTE — ANESTHESIA PAT ROS NOTE
02/12/2020  Annika Mac is a 69 y.o., female.      Pre-op Assessment         Review of Systems  Anesthesia Hx:  No problems with previous Anesthesia  History of prior surgery of interest to airway management or planning: Previous anesthesia: MAC  colonoscopy 2/26/18 with MAC.    Social:  No Alcohol Use, Non-Smoker    Hematology/Oncology:        Hematology Comments: On asa Oncology Comments: Iron deficiency anemia    EENT/Dental:   Had cataract surgery -- fragments in eye post-op   Cardiovascular:   Exercise tolerance: poor Hypertension CHF hyperlipidemia Pt on asa  PAD  Diastolic dysfunction  ECTATIC AORTA   Pulmonary:   Asthma mild Sleep Apnea, CPAP Doesn't consistantly use c-pap   Renal/:   Chronic Renal Disease CKD 4   Hepatic/GI:   GERD    Musculoskeletal:   Arthritis  Hx gout  Chronic back pain  Osteoarthritis  Gait abnormality uses walker & cane Spine Disorders: lumbar and cervical    Neurological:   CVA, no residual symptoms Neuromuscular Disease, Hx stroke  Lumbar spinal stenosis  Osteoarthritis of spine  Cervical myelopathy  Peripheral Neuropathy    Endocrine:   Diabetes, type 2, using insulin States blood sugars 140s-200s  On 70/30 insulin  Vit D deficiency   Dermatological:  Skin Normal    Psych:   depression             Anesthesia Assessment: Preoperative EQUATION    Planned Procedure: Procedure(s) (LRB):  CYSTOSCOPY, WITH RETROGRADE PYELOGRAM (Left)  Requested Anesthesia Type:General  Surgeon: Noman Rivas MD  Service: Urology  Known or anticipated Date of Surgery:2/26/2020    Surgeon notes: reviewed    Electronic QUestionnaire Assessment completed via nurse interview with patient.        Triage considerations:       Previous anesthesia records:MAC2/26/18    Last PCP note: within 3 months , within [facility] Dr. K. Johnson    Subspecialty notes: Cardiology: General, Dermatology, Endocrinology,  ENT, Gastroenterology, Hematology/Oncology, Rheumatology, Urology    Other important co-morbidities: CHF, DM2, GERD, HLD, HTN and RUFINA      Diagnosis Date    Allergy      Anemia 7/27/2012    Arthritis      CHF (congestive heart failure)      CKD (chronic kidney disease) stage 3, GFR 30-59 ml/min 7/27/2012    Clotting disorder      Deep vein thrombophlebitis of left leg 7/27/2012    Degenerative disc disease      Diabetic peripheral neuropathy associated with type 2 diabetes mellitus 7/27/2012    GERD (gastroesophageal reflux disease)      HTN (hypertension) 7/27/2012    Hyperlipidemia 7/27/2012    Lead-induced gout of ankle or foot       right going on three weeks    Nonproliferative diabetic retinopathy of right eye 7/27/2012    Obstructive sleep apnea 7/27/2012    Osteoporosis      Proliferative diabetic retinopathy of left eye 7/27/2012    Stroke 8/1/2003    Type 2 diabetes mellitus with diabetic polyneuropathy 7/27/2012    Ulcer        Tests already available:  Available tests,  within 1 month , within [facility] .             Instructions given. (See in Nurse's note)    Optimization:  No issues with anesthesia    Medical Opinion Indicated  Need asa instruction       Sub-specialist consult indicated:   Endocrine  Need OZEMPIC instructions      Plan:    Testing:  current labs   Pre-anesthesia  visit       Visit focus: none     Consultation:PCP & ENDOCRINE      Navigation:             Consults scheduled.             Results will be tracked by Preop Clinic.     Addendum:     Magen Hernandez MA 28 minutes ago (3:30 PM)         ----- Message from Mamie Cotton MD sent at 2/12/2020 11:46 AM CST -----  Okay to hold asprin 5 days prior if needed  Then resume   Kj   ----- Message -----  From: Magen Hernandez MA  Sent: 2/12/2020   9:53 AM CST  To: Mamie Cotton MD           ----- Message -----  From: Sulma Rosas RN  Sent: 2/12/2020   9:45 AM CST  To: Mamie Mora  MD Yury, #     Pt. Is scheduled for cystoscopy with retrograde pyelogram by Dr. Rivas 2/26/20  Request ASA instructions please           SUSSY Rosas RN BC  Pre-op anesthesia              Sheree Brito MA   Medical Assistant   Surgery   Telephone Encounter   Signed   Encounter Date:  2/12/2020                    []Hide copied text    []Hover for details  I agree with the insulin recommendations to decrease the day of the procedure.   She is okay taking the Ozempic. Received message from  NP Edelmira Villa NP in absence of Ursula Salinas NP.         Thank you.       Electronically signed by Sheree Brito MA at 2/12/2020  1:24 PM     2/13/2020-Medication instructions (Asa & Ozempic) given to patient. Patient verbalized understanding. SHYLA Sandhu

## 2020-02-12 NOTE — TELEPHONE ENCOUNTER
----- Message from Sulma Rosas RN sent at 2/12/2020 10:37 AM CST -----  Pt. Is scheduled to have cystoscopy with retrograde pyelogram By Dr. Rivas 2/26  Our instructions usually are to take normal pm dose and 30% of am dose she takes 70/30 insulin  But on wednes also take OZEMPIC ( surgery is on a Wednesday)    Do we hold this  Please advise    SUSSY Rosas RN BC  Pre-op anesthesia

## 2020-02-24 ENCOUNTER — TELEPHONE (OUTPATIENT)
Dept: PEDIATRIC UROLOGY | Facility: CLINIC | Age: 70
End: 2020-02-24

## 2020-02-24 NOTE — TELEPHONE ENCOUNTER
Called pt to confirm arrival time of 12pm for procedure on 2/26/. Gave pt NPO instructions and gave pt opportunity to ask questions. Pt verbalized understanding.

## 2020-02-25 PROBLEM — N28.89 LEFT RENAL MASS: Status: ACTIVE | Noted: 2020-02-25

## 2020-02-26 ENCOUNTER — HOSPITAL ENCOUNTER (OUTPATIENT)
Facility: HOSPITAL | Age: 70
Discharge: HOME | End: 2020-02-26
Attending: UROLOGY | Admitting: UROLOGY
Payer: MEDICARE

## 2020-02-26 ENCOUNTER — ANESTHESIA (OUTPATIENT)
Dept: SURGERY | Facility: HOSPITAL | Age: 70
End: 2020-02-26
Payer: MEDICARE

## 2020-02-26 ENCOUNTER — ANESTHESIA EVENT (OUTPATIENT)
Dept: SURGERY | Facility: HOSPITAL | Age: 70
End: 2020-02-26
Payer: MEDICARE

## 2020-02-26 VITALS
OXYGEN SATURATION: 98 % | RESPIRATION RATE: 18 BRPM | TEMPERATURE: 99 F | BODY MASS INDEX: 48.82 KG/M2 | HEART RATE: 86 BPM | DIASTOLIC BLOOD PRESSURE: 72 MMHG | SYSTOLIC BLOOD PRESSURE: 139 MMHG | WEIGHT: 293 LBS | HEIGHT: 65 IN

## 2020-02-26 DIAGNOSIS — N28.89 LEFT RENAL MASS: ICD-10-CM

## 2020-02-26 DIAGNOSIS — N28.89 RENAL MASS: Primary | ICD-10-CM

## 2020-02-26 LAB
POCT GLUCOSE: 247 MG/DL (ref 70–110)
POCT GLUCOSE: 307 MG/DL (ref 70–110)

## 2020-02-26 PROCEDURE — 63600175 PHARM REV CODE 636 W HCPCS: Performed by: ANESTHESIOLOGY

## 2020-02-26 PROCEDURE — 37000008 HC ANESTHESIA 1ST 15 MINUTES: Performed by: UROLOGY

## 2020-02-26 PROCEDURE — 36000707: Performed by: UROLOGY

## 2020-02-26 PROCEDURE — 74420 UROGRAPHY RTRGR +-KUB: CPT | Mod: 26,,, | Performed by: UROLOGY

## 2020-02-26 PROCEDURE — 63600175 PHARM REV CODE 636 W HCPCS

## 2020-02-26 PROCEDURE — 36000706: Performed by: UROLOGY

## 2020-02-26 PROCEDURE — D9220A PRA ANESTHESIA: Mod: CRNA,,, | Performed by: NURSE ANESTHETIST, CERTIFIED REGISTERED

## 2020-02-26 PROCEDURE — 25500020 PHARM REV CODE 255: Performed by: UROLOGY

## 2020-02-26 PROCEDURE — 71000015 HC POSTOP RECOV 1ST HR: Performed by: UROLOGY

## 2020-02-26 PROCEDURE — 82962 GLUCOSE BLOOD TEST: CPT | Performed by: UROLOGY

## 2020-02-26 PROCEDURE — 52005 CYSTO W/URTRL CATHJ: CPT | Mod: ,,, | Performed by: UROLOGY

## 2020-02-26 PROCEDURE — 37000009 HC ANESTHESIA EA ADD 15 MINS: Performed by: UROLOGY

## 2020-02-26 PROCEDURE — D9220A PRA ANESTHESIA: ICD-10-PCS | Mod: CRNA,,, | Performed by: NURSE ANESTHETIST, CERTIFIED REGISTERED

## 2020-02-26 PROCEDURE — 71000016 HC POSTOP RECOV ADDL HR: Performed by: UROLOGY

## 2020-02-26 PROCEDURE — D9220A PRA ANESTHESIA: ICD-10-PCS | Mod: ANES,,, | Performed by: ANESTHESIOLOGY

## 2020-02-26 PROCEDURE — 00910 ANES TRANSURETHRAL PX NOS: CPT | Performed by: UROLOGY

## 2020-02-26 PROCEDURE — 71000044 HC DOSC ROUTINE RECOVERY FIRST HOUR: Performed by: UROLOGY

## 2020-02-26 PROCEDURE — 76000 PR  FLUOROSCOPE EXAMINATION: ICD-10-PCS | Mod: 26,59,, | Performed by: UROLOGY

## 2020-02-26 PROCEDURE — D9220A PRA ANESTHESIA: Mod: ANES,,, | Performed by: ANESTHESIOLOGY

## 2020-02-26 PROCEDURE — C1758 CATHETER, URETERAL: HCPCS | Performed by: UROLOGY

## 2020-02-26 PROCEDURE — 76000 FLUOROSCOPY <1 HR PHYS/QHP: CPT | Mod: 26,59,, | Performed by: UROLOGY

## 2020-02-26 PROCEDURE — 63600175 PHARM REV CODE 636 W HCPCS: Performed by: STUDENT IN AN ORGANIZED HEALTH CARE EDUCATION/TRAINING PROGRAM

## 2020-02-26 PROCEDURE — 63600175 PHARM REV CODE 636 W HCPCS: Performed by: NURSE ANESTHETIST, CERTIFIED REGISTERED

## 2020-02-26 PROCEDURE — 52005 PR CYSTOURETHROSCOPY,URETER CATHETER: ICD-10-PCS | Mod: ,,, | Performed by: UROLOGY

## 2020-02-26 PROCEDURE — 25000003 PHARM REV CODE 250: Performed by: ANESTHESIOLOGY

## 2020-02-26 PROCEDURE — 74420 PR  X-RAY RETROGRADE PYELOGRAM: ICD-10-PCS | Mod: 26,,, | Performed by: UROLOGY

## 2020-02-26 RX ORDER — ONDANSETRON 2 MG/ML
INJECTION INTRAMUSCULAR; INTRAVENOUS
Status: DISCONTINUED | OUTPATIENT
Start: 2020-02-26 | End: 2020-02-26

## 2020-02-26 RX ORDER — LIDOCAINE HYDROCHLORIDE 20 MG/ML
INJECTION INTRAVENOUS
Status: DISCONTINUED | OUTPATIENT
Start: 2020-02-26 | End: 2020-02-26

## 2020-02-26 RX ORDER — SODIUM CHLORIDE 0.9 % (FLUSH) 0.9 %
2 SYRINGE (ML) INJECTION
Status: DISCONTINUED | OUTPATIENT
Start: 2020-02-26 | End: 2020-02-26 | Stop reason: HOSPADM

## 2020-02-26 RX ORDER — FENTANYL CITRATE 50 UG/ML
INJECTION, SOLUTION INTRAMUSCULAR; INTRAVENOUS
Status: DISCONTINUED | OUTPATIENT
Start: 2020-02-26 | End: 2020-02-26

## 2020-02-26 RX ORDER — FENTANYL CITRATE 50 UG/ML
25 INJECTION, SOLUTION INTRAMUSCULAR; INTRAVENOUS EVERY 5 MIN PRN
Status: DISCONTINUED | OUTPATIENT
Start: 2020-02-26 | End: 2020-02-26 | Stop reason: HOSPADM

## 2020-02-26 RX ORDER — ONDANSETRON 2 MG/ML
4 INJECTION INTRAMUSCULAR; INTRAVENOUS ONCE AS NEEDED
Status: DISCONTINUED | OUTPATIENT
Start: 2020-02-26 | End: 2020-02-26 | Stop reason: HOSPADM

## 2020-02-26 RX ORDER — PROPOFOL 10 MG/ML
VIAL (ML) INTRAVENOUS CONTINUOUS PRN
Status: DISCONTINUED | OUTPATIENT
Start: 2020-02-26 | End: 2020-02-26

## 2020-02-26 RX ORDER — SODIUM CHLORIDE 9 MG/ML
INJECTION, SOLUTION INTRAVENOUS CONTINUOUS
Status: DISCONTINUED | OUTPATIENT
Start: 2020-02-26 | End: 2020-02-26 | Stop reason: HOSPADM

## 2020-02-26 RX ORDER — PROPOFOL 10 MG/ML
VIAL (ML) INTRAVENOUS
Status: DISCONTINUED | OUTPATIENT
Start: 2020-02-26 | End: 2020-02-26

## 2020-02-26 RX ORDER — LIDOCAINE HYDROCHLORIDE 10 MG/ML
1 INJECTION, SOLUTION EPIDURAL; INFILTRATION; INTRACAUDAL; PERINEURAL ONCE
Status: COMPLETED | OUTPATIENT
Start: 2020-02-26 | End: 2020-02-26

## 2020-02-26 RX ORDER — CEFAZOLIN SODIUM 1 G/3ML
2 INJECTION, POWDER, FOR SOLUTION INTRAMUSCULAR; INTRAVENOUS
Status: COMPLETED | OUTPATIENT
Start: 2020-02-26 | End: 2020-02-26

## 2020-02-26 RX ADMIN — PROPOFOL 30 MG: 10 INJECTION, EMULSION INTRAVENOUS at 01:02

## 2020-02-26 RX ADMIN — PROPOFOL 150 MCG/KG/MIN: 10 INJECTION, EMULSION INTRAVENOUS at 01:02

## 2020-02-26 RX ADMIN — LIDOCAINE HYDROCHLORIDE 10 MG: 10 INJECTION, SOLUTION EPIDURAL; INFILTRATION; INTRACAUDAL; PERINEURAL at 12:02

## 2020-02-26 RX ADMIN — INSULIN HUMAN 6 UNITS: 100 INJECTION, SOLUTION PARENTERAL at 12:02

## 2020-02-26 RX ADMIN — ONDANSETRON 4 MG: 2 INJECTION INTRAMUSCULAR; INTRAVENOUS at 01:02

## 2020-02-26 RX ADMIN — SODIUM CHLORIDE: 0.9 INJECTION, SOLUTION INTRAVENOUS at 12:02

## 2020-02-26 RX ADMIN — FENTANYL CITRATE 25 MCG: 50 INJECTION INTRAMUSCULAR; INTRAVENOUS at 01:02

## 2020-02-26 RX ADMIN — FENTANYL CITRATE 25 MCG: 50 INJECTION, SOLUTION INTRAMUSCULAR; INTRAVENOUS at 01:02

## 2020-02-26 RX ADMIN — INSULIN HUMAN 4 UNITS: 100 INJECTION, SOLUTION PARENTERAL at 12:02

## 2020-02-26 RX ADMIN — LIDOCAINE HYDROCHLORIDE 80 MG: 20 INJECTION, SOLUTION INTRAVENOUS at 01:02

## 2020-02-26 RX ADMIN — CEFAZOLIN 2 G: 330 INJECTION, POWDER, FOR SOLUTION INTRAMUSCULAR; INTRAVENOUS at 01:02

## 2020-02-26 NOTE — OP NOTE
[facility]  Operative Note    Date: 02/26/2020    Pre-Op Diagnosis:   Renal mass  Patient Active Problem List    Diagnosis Date Noted    Left renal mass 02/25/2020    Unspecified inflammatory spondylopathy, lumbar region 01/22/2020    Cervical myelopathy 01/22/2020    Polyneuropathy associated with underlying disease 01/22/2020    Constipation 10/03/2019    Lower extremity weakness 07/15/2019    Decreased functional mobility and endurance 07/15/2019    Gait abnormality 07/15/2019    Posture imbalance 07/15/2019    Hypoglycemia unawareness associated with type 2 diabetes mellitus 07/01/2019    Pain in both wrists 06/19/2019    Joint stiffness of both wrists 06/19/2019    Muscle weakness 06/19/2019    Class 3 severe obesity in adult 05/24/2019    Hyperglycemia 05/23/2019    Anemia in chronic kidney disease (CKD) 04/12/2019    Morbid obesity due to excess calories 04/12/2019    Stage 4 chronic kidney disease 02/13/2019    Obesity, diabetes, and hypertension syndrome 02/13/2019    Ectatic aorta 02/13/2019    Elbow pain, left 07/11/2018    Secondary hyperparathyroidism 03/07/2018    Iron deficiency anemia 02/26/2018    Uncontrolled type 2 diabetes mellitus with both eyes affected by proliferative retinopathy without macular edema, with long-term current use of insulin 10/24/2017    Osteoarthritis of spine with radiculopathy, cervical region 08/04/2017    Radicular pain in left arm 08/04/2017    Candidiasis, cutaneous 05/22/2017    Cough syncope 01/27/2017    GERD (gastroesophageal reflux disease) 01/27/2017    Primary osteoarthritis of right knee 11/11/2016    Essential hypertension 10/27/2016    Dyslipidemia 10/27/2016    Cough 09/01/2016    Peripheral neuropathy 05/04/2016    Vitamin D insufficiency 05/04/2016    Chronic back pain 12/11/2015    Lumbar spinal stenosis 12/11/2015    Diastolic dysfunction 07/31/2015    Peripheral autonomic neuropathy due to DM  02/16/2015    PCO (posterior capsular opacification) 01/08/2015    Cataract fragments in eye following cataract surgery 10/05/2014    Lumbar facet arthropathy 07/29/2014    PAD (peripheral artery disease) 11/21/2013    Morbid obesity with BMI of 50.0-59.9, adult 11/09/2012    Gout, chronic 09/24/2012    Epiretinal membrane 08/22/2012    Type 2 diabetes mellitus with diabetic polyneuropathy, with long-term current use of insulin 07/27/2012    Obstructive sleep apnea 07/27/2012    Type 2 DM with CKD stage 4 and hypertension 07/27/2012    History of stroke 08/01/2003     Post-Op Diagnosis: same    Procedure(s) Performed:   1.  Cystoscopy with bilateral retrograde pyelogram  2.  Fluoroscopy < 1 hour  3.  Intra-operative interpretation of radiographic images    Specimen(s): none    Staff Surgeon: Noman Rivas MD     Assistant Surgeon: Rashaun Alex MD    Anesthesia: Monitored Local Anesthesia with Sedation    Indications: Annika Mac is a 69 y.o. female with a left, small, solid renal mass found on US. She presents for cysto with bilateral retrograde pyelograms    Findings:   Bilateral retrograde pyelogram showed delicate calices with no evidence of hydronephrosis, no filling defects, and ureter normal in caliber with medial deviation of the distal ureters bilaterally    Estimated Blood Loss: min    Drains: none    Procedure in Detail:  After risks, benefits and possible complications of cystoscopy were explained, the patient elected to undergo the procedure and informed consent was obtained. All questions were answered in the radha-operative area. The patient was transferred to the cystoscopy suite and placed in the supine position.  SCDs were applied and working.  MAC anesthesia was administered.  Once adequately sedated, the patient was placed in the dorsal lithotomy position and prepped and draped in the usual sterile fashion.  Time out was performed, radha-procedural antibiotics were confirmed.     A rigid  cystoscope in a 22 Fr sheath was introduced into the bladder per urethra. This passed easily. The entire urethra was visualized which showed no masses or strictures.  The right and left ureteral orifices were identified in the normal anatomic position.  Formal cystoscopy was performed which revealed no masses or lesions suspicious for malignancy, no trabeculations, no bladder stones and no bladder diverticula.     The right UO was identified and cannulated with a 5 Fr open-ended ureteral catheter. Using fluoroscopy, a RPG was performed which showed the above findings. This was then repeated on the left side, revealing the above findings.     The bladder was drained, and the patient was removed from lithotomy.     The patient tolerated the procedure well and was transferred to recovery in stable condition.    Disposition:  The patient will follow up with Abbey Quiñones in 3 months to discuss further management of the renal mass, likely surveillance for now.    Rashaun Alex MD

## 2020-02-26 NOTE — TRANSFER OF CARE
"Anesthesia Transfer of Care Note    Patient: Annika Mac    Procedure(s) Performed: Procedure(s) (LRB):  CYSTOSCOPY, WITH RETROGRADE PYELOGRAM (Left)    Patient location: PACU    Anesthesia Type: general    Transport from OR: Transported from OR on 6-10 L/min O2 by face mask with adequate spontaneous ventilation    Post pain: adequate analgesia    Post assessment: no apparent anesthetic complications and tolerated procedure well    Post vital signs: stable    Level of consciousness: awake and alert    Nausea/Vomiting: no nausea/vomiting    Complications: none    Transfer of care protocol was followed      Last vitals:   Visit Vitals  /67   Pulse 96   Temp 36.8 °C (98.2 °F) (Oral)   Resp 20   Ht 5' 5" (1.651 m)   Wt 134.3 kg (296 lb)   SpO2 96%   Breastfeeding? No   BMI 49.26 kg/m²     "

## 2020-02-26 NOTE — ANESTHESIA PREPROCEDURE EVALUATION
"                                                                                                             02/26/2020  Annika Mac is a 69 y.o., female who is scheduled for Procedure(s) (LRB):  CYSTOSCOPY, WITH RETROGRADE PYELOGRAM (Left).     Allergies:   Review of patient's allergies indicates:   Allergen Reactions    Iodinated contrast- oral and iv dye Rash     Current Medications:   Current Outpatient Medications   Medication Sig Dispense Refill    albuterol (PROAIR HFA) 90 mcg/actuation inhaler Inhale 2 puffs into the lungs every 6 (six) hours as needed for Wheezing. Rescue 18 g 6    allopurinol (ZYLOPRIM) 300 MG tablet Take 300 mg by mouth once daily.       amLODIPine (NORVASC) 10 MG tablet Take 1 tablet (10 mg total) by mouth once daily. 90 tablet 3    aspirin 81 MG Chew Take 1 tablet (81 mg total) by mouth once daily.  0    BD ULTRA-FINE KIKA PEN NEEDLE 32 gauge x 5/32" Ndle To use 4 times daily 200 each 20    blood sugar diagnostic Strp 1 each by Misc.(Non-Drug; Combo Route) route 3 (three) times daily. Dx code  E11.42 . Contour Next 120 each 12    blood-glucose meter kit Use as instructed, true test/result meter 1 each 0    blood-glucose meter kit 1 each by Other route once daily. Use daily. Insurance proffered one touch  E11.42 1 each 0    chlorthalidone (HYGROTEN) 25 MG Tab Take 1 tablet (25 mg total) by mouth once daily. 30 tablet 11    colchicine (COLCRYS) 0.6 mg tablet Take 0.6 mg by mouth as needed.      diphenhydrAMINE (BENADRYL) 25 mg capsule Take one hour prior to procedure 2 capsule 0    DULoxetine (CYMBALTA) 30 MG capsule Take 1 capsule (30 mg total) by mouth once daily. 90 capsule 3    famotidine (PEPCID) 20 MG tablet Take 1 tablet (20 mg total) by mouth 2 (two) times daily. 1 tablet 0    fexofenadine (ALLEGRA) 180 MG tablet TAKE 1 TABLET BY MOUTH ONCE DAILY (Patient taking differently: Take by mouth. Uses as needed) 30 tablet 0    fluocinonide (LIDEX) 0.05 % external " solution AAA scalp qday - bid prn pruritus 60 mL 3    glucagon, human recombinant, (GLUCAGON EMERGENCY KIT, HUMAN,) 1 mg SolR Inject 1 mg into the muscle as needed. 1 each 3    hydrocortisone (ANUSOL-HC) 2.5 % rectal cream Place rectally 2 (two) times daily as needed for Hemorrhoids. 28 g 1    insulin NPH-insulin regular, 70/30, (NOVOLIN 70/30 U-100 INSULIN) 100 unit/mL (70-30) injection INJECT 120 UNITS INTO THE SKIN WITH BREAKFAST, 110 UNITS WITH LUNCH, AND INJECT 90 UNITS WITH DINNER ;  (Patient taking differently: INJECT 110  UNITS INTO THE SKIN WITH BREAKFAST, 100 UNITS WITH LUNCH, AND INJECT 90 UNITS WITH DINNER ; ) 30 mL 0    irbesartan (AVAPRO) 300 MG tablet Take 1 tablet (300 mg total) by mouth every evening. 90 tablet 3    labetalol (NORMODYNE) 300 MG tablet Take 1 tablet (300 mg total) by mouth 2 (two) times daily. 180 tablet 3    lancets 31 gauge Misc 1 lancet by Misc.(Non-Drug; Combo Route) route 4 (four) times daily. E11.42 . Prescribed one touch 200 each 6    lancets 33 gauge Misc 1 lancet by Misc.(Non-Drug; Combo Route) route 4 (four) times daily. Dx code E11.42 200 each 12    multivitamin (THERAGRAN) per tablet Take 1 tablet by mouth once daily.      nystatin (MYCOSTATIN) powder Apply topically 3 (three) times daily as needed. 60 g 2    predniSONE (DELTASONE) 50 MG Tab 1. Take 1 tab 13 hours before procedure. Then take 1 tab 7 hours before procedure. Then take 1 tab 1 hour before procedure. 3 tablet 0    pregabalin (LYRICA) 150 MG capsule Take 1 capsule (150 mg total) by mouth 2 (two) times daily. 60 capsule 5    semaglutide (OZEMPIC SUBQ) Inject into the skin.      simvastatin (ZOCOR) 40 MG tablet Take 1 tablet (40 mg total) by mouth every evening. 90 tablet 4    sodium bicarbonate 325 MG tablet Take 2 tablets (650 mg total) by mouth 2 (two) times daily. 120 tablet 11    traMADol (ULTRAM) 50 mg tablet Take 1 tablet (50 mg total) by mouth every 8 (eight) hours as needed.  90 tablet 3     No current facility-administered medications for this visit.      Medical History:   Past Medical History:   Diagnosis Date    Allergy     Anemia 7/27/2012    Anticoagulant long-term use     Arthritis     Asthma     CHF (congestive heart failure)     CKD (chronic kidney disease) stage 3, GFR 30-59 ml/min 7/27/2012    Clotting disorder     Colon polyp     Deep vein thrombophlebitis of left leg 7/27/2012    Degenerative disc disease     Diabetic peripheral neuropathy associated with type 2 diabetes mellitus 7/27/2012    GERD (gastroesophageal reflux disease)     HTN (hypertension) 7/27/2012    Hyperlipidemia 7/27/2012    Lead-induced gout of ankle or foot     right going on three weeks    Nonproliferative diabetic retinopathy of right eye 7/27/2012    Obstructive sleep apnea 7/27/2012    Osteoporosis     Proliferative diabetic retinopathy of left eye 7/27/2012    Stroke 8/1/2003    Type 2 diabetes mellitus with diabetic polyneuropathy 7/27/2012    Ulcer      .    Pre-op Assessment    I have reviewed the Patient Summary Reports.     I have reviewed the Nursing Notes.   I have reviewed the Medications.     Review of Systems  Anesthesia Hx:  No problems with previous Anesthesia  Denies Family Hx of Anesthesia complications.   Denies Personal Hx of Anesthesia complications.   Social:  Non-Smoker  Denies Tobacco Use.   Cardiovascular:   Hypertension, well controlled Denies Valvular problems/Murmurs.  Denies MI.  Denies CAD.    Denies CABG/stent.  CHF PVD hyperlipidemia  Denies Coronary Artery Disease.  Deep Venous Thrombosis (DVT), Hx of DVT  Peripheral Arterial Disease  Hypertension, Essential Hypertension , Pt in optimal range, systolic < 120 and diastolic < 80, Well Controlled on Rx    Pulmonary:   Denies COPD. Asthma asymptomatic and mild  Denies Shortness of breath.  Denies Recent URI. Sleep Apnea, CPAP  Asthma:   Inhaler use is rescue inhaler PRN. Current breathing status is  optimal, free of wheezing.    Renal/:   Chronic Renal Disease, CRI  Kidney Function/Disease, Chronic Kidney Disease (CKD) , CKD Stage III (GFR 30-59) , contributing etiologies of Diabetic Glomerulopathy, Hypertensive Nephrosclerosis.    Hepatic/GI:   GERD, well controlled Denies Liver Disease.  Esophageal / Stomach Disorders Gerd Controlled by chronic antireflux medication.  Denies Liver Disease    Musculoskeletal:   Arthritis     Neurological:   Denies TIA. CVA, no residual symptoms Neuromuscular Disease, Denies Seizures.  Denies Seizure Disorder  CVA - Cerebrovasular Accident , has had 1 stroke , residual deficits are no residual deficit.  Denies TIA - Transient Ischemic Attack    Endocrine:   Diabetes, poorly controlled, type 2, using insulin Denies Hypothyroidism.  Diabetes, Type 2 Diabetes , Complications include Diabetic Neuropathy, peripheral sensory neuropathy , controlled by insulin. , most recent HgA1c value was 8.2 on 11/13/19.  Denies Thyroid Disease  Parathyroid Disease, Hyperparathyroidism  Metabolic Disorders, Hyperlipoproteinemia, hypercholesterolemia, controlled on medication, Morbid Obesity / BMI > 40      Physical Exam  General:  Morbid Obesity    Airway/Jaw/Neck:  Airway Findings: Mouth Opening: Normal Tongue: Normal  General Airway Assessment: Adult  Mallampati: IV  Improves to III with phonation.  TM Distance: 4 - 6 cm         Dental:  Dental Findings: In tact, Upper Dentures   Chest/Lungs:  Chest/Lungs Findings: Clear to auscultation, Normal Respiratory Rate     Heart/Vascular:  Heart Findings: Rate: Normal  Rhythm: Regular Rhythm  Sounds: Normal  Heart murmur: negative       Mental Status:  Mental Status Findings:  Cooperative, Alert and Oriented         Anesthesia Plan  Type of Anesthesia, risks & benefits discussed:  Anesthesia Type:  MAC  Patient's Preference:   Intra-op Monitoring Plan: standard ASA monitors  Intra-op Monitoring Plan Comments:   Post Op Pain Control Plan: per primary  service following discharge from PACU, IV/PO Opioids PRN and multimodal analgesia  Post Op Pain Control Plan Comments:   Induction:   IV  Beta Blocker:  Patient is on a Beta-Blocker and has received one dose within the past 24 hours (No further documentation required).       Informed Consent: Patient understands risks and agrees with Anesthesia plan.  Questions answered. Anesthesia consent signed with patient.  ASA Score: 3     Day of Surgery Review of History & Physical:    H&P update referred to the surgeon.         Ready For Surgery From Anesthesia Perspective.

## 2020-02-26 NOTE — DISCHARGE INSTRUCTIONS
Cystoscopy    Cystoscopy is a procedure that lets your doctor look directly inside your urethra and bladder. It can be used to:  · Help diagnose a problem with your urethra, bladder, or kidneys.  · Take a sample (biopsy) of bladder or urethral tissue.  · Treat certain problems (such as removing kidney stones).  · Place a stent to bypass an obstruction.  · Take special X-rays of the kidneys.  Based on the findings, your doctor may recommend other tests or treatments.  What is a cystoscope?  A cystoscope is a telescope-like instrument that contains lenses and fiberoptics (small glass wires that make bright light). The cystoscope may be straight and rigid, or flexible to bend around curves in the urethra. The doctor may look directly into the cystoscope, or project the image onto a monitor.  Getting ready  · Ask your doctor if you should stop taking any medicines before the procedure.  · Ask whether you should avoid eating or drinking anything after midnight before the procedure.  · Follow any other instructions your doctor gives you.  Tell your doctor before the exam if you:  · Take any medicines, such as aspirin or blood thinners  · Have allergies to any medicines  · Are pregnant   The procedure  Cystoscopy is done in the doctors office, surgery center, or hospital. The doctor and a nurse are present during the procedure. It takes only a few minutes, longer if a biopsy, X-ray, or treatment needs to be done.  During the procedure:  · You lie on an exam table on your back, knees bent and legs apart. You are covered with a drape.  · Your urethra and the area around it are washed. Anesthetic jelly may be applied to numb the urethra. Other pain medicine is usually not needed. In some cases, you may be offered a mild sedative to help you relax. If a more extensive procedure is to be done, such as a biopsy or kidney stone removal, general anesthesia may be needed.  · The cystoscope is inserted. A sterile fluid is put  into the bladder to expand it. You may feel pressure from this fluid.  · When the procedure is done, the cystoscope is removed.  After the procedure  If you had a sedative, general anesthesia, or spinal anesthesia, you must have someone drive you home. Once youre home:  · Drink plenty of fluids.  · You may have burning or light bleeding when you urinate--this is normal.  · Medicines may be prescribed to ease any discomfort or prevent infection. Take these as directed.  · Call your doctor if you have heavy bleeding or blood clots, burning that lasts more than a day, a fever over 100°F  (38° C), or trouble urinating.  Date Last Reviewed: 1/1/2017 © 2000-2017 [employer]. [address] [place] [address], [place] [address]. All rights reserved. This information is not intended as a substitute for professional medical care. Always follow your healthcare professional's instructions.          Cystography (Retrograde Cystography)  Cystography (also called retrograde cystography) is a detailed X-ray exam of your bladder. For this procedure, your bladder is filled with an X-ray dye (contrast medium). The dye lets your bladder be seen more clearly on the X-ray images. This procedure is done by a radiologist.    Why cystography is done  A cystography can help diagnose such bladder problems as:  · Kidney stones  · Wounds or bursting (rupture) of your bladder wall  · Urinary tract infection  · Blood clots  · Tumors  Getting ready for your procedure  · If instructed, empty your bowel before the exam using a medicine (laxative) or a liquid injected into your rectum (enema).  · Empty your bladder before the exam.  Tell your provider if you:  · Have any allergies to X-ray dye  · Have a bladder infection  · Are pregnant or think you may be pregnant  Follow any other instructions you are given.  During your procedure  · You will change into a hospital gown and lie on an exam table. Your urethra will be numbed with an anesthetic  jelly. You may also be given medication to help you relax.  · A thin tube (catheter) will be put into your urethra up to your bladder. You will feel pressure. The dye will be slowly put through the catheter into your bladder. As your bladder fills with this liquid, you will feel the need to urinate. Tell the radiologist when this becomes uncomfortable.  · X-rays are taken of your full bladder. The catheter is removed, your bladder is then drained, and more X-rays are taken.  Possible risks and complications  · Infection or bruising at the place where the tube is put into your urethra  · Problems due to the dye. This includes an allergic reaction or kidney damage.  · Radiation exposure to your reproductive organs   After your procedure  · You may feel some burning when you urinate the first few times after the test. Drink plenty of water after the exam to help flush the dye out of your body.   · Your provider will discuss your test results with you. He or she will recommend more testing or treatment as needed.  When to call your healthcare provider   Call your provider right away if you have:  · Blood in your urine, after you have gone to the bathroom three times   · Signs of infection, such as chills, fever, rapid heartbeat, or fast breathing         Date Last Reviewed: 7/23/2015 © 2000-2017 [employer] [address] [place] [address], [place] [address]. All rights reserved. This information is not intended as a substitute for professional medical care. Always follow your healthcare professional's instructions.

## 2020-02-26 NOTE — ANESTHESIA POSTPROCEDURE EVALUATION
Anesthesia Post Evaluation    Patient: Annika Mac    Procedure(s) Performed: Procedure(s) (LRB):  CYSTOSCOPY, WITH RETROGRADE PYELOGRAM (Left)    Final Anesthesia Type: general    Patient location during evaluation: PACU  Patient participation: Yes- Able to Participate  Level of consciousness: awake and alert and oriented  Post-procedure vital signs: reviewed and stable  Pain management: adequate  Airway patency: patent    PONV status at discharge: No PONV  Anesthetic complications: no      Cardiovascular status: blood pressure returned to baseline and hemodynamically stable  Respiratory status: room air, spontaneous ventilation and unassisted  Hydration status: euvolemic  Follow-up not needed.          Vitals Value Taken Time   /67 2/26/2020  1:47 PM   Temp 37.1 °C (98.8 °F) 2/26/2020  1:34 PM   Pulse 92 2/26/2020  1:56 PM   Resp 14 2/26/2020  1:56 PM   SpO2 100 % 2/26/2020  1:56 PM   Vitals shown include unvalidated device data.      No case tracking events are documented in the log.      Pain/Zechariah Score: Zechariah Score: 8 (2/26/2020  1:34 PM)

## 2020-02-26 NOTE — INTERVAL H&P NOTE
"The patient has been examined and the H&P has been reviewed:    I concur with the findings and no changes have occurred since H&P was written.    Anesthesia/Surgery risks, benefits and alternative options discussed and understood by patient/family.          Active Hospital Problems    Diagnosis  POA    *Left renal mass [N28.89]  Yes    Anemia in chronic kidney disease (CKD) [N18.9, D63.1]  Yes    Secondary hyperparathyroidism [N25.81]  Yes    GERD (gastroesophageal reflux disease) [K21.9]  Yes    Dyslipidemia [E78.5]  Yes     Chronic    Essential hypertension [I10]  Yes    Diastolic dysfunction [I51.89]  Yes     Chronic     68 yo F with PMH CKD 2, HTN, DM2, CVA, OA presenting for follow up of her Hypertension. She denies any MCFADDEN, Chest pain or new symptoms except for mild leg swelling and some numbness in RUE. She takes her medications as prescribed. She tries to eat a healthy diet but notes it is "hard" and has active weight issues and diabetes.          PAD (peripheral artery disease) [I73.9]  Yes     Chronic    Morbid obesity with BMI of 50.0-59.9, adult [E66.01, Z68.43]  Not Applicable    Obstructive sleep apnea [G47.33]  Yes     Chronic    Type 2 diabetes mellitus with diabetic polyneuropathy, with long-term current use of insulin [E11.42, Z79.4]  Not Applicable    History of stroke [Z86.73]  Not Applicable      Resolved Hospital Problems   No resolved problems to display.     "

## 2020-02-26 NOTE — PLAN OF CARE
Discharge instructions given to and explained to patient and her . All questions answered and pt and family member verbalized understanding. IV discontinued with cannula intact. Vital signs stable and pt AOx4. Dr. Rivas at the bedside when the pt voided, although it wasn't measured the pt stated it was a large amount of urine and Dr. Rivas verbalized that the patient could be discharged.

## 2020-02-26 NOTE — DISCHARGE SUMMARY
"[facility]  Discharge Note  Short Stay    Admit Date: 2/26/2020    Discharge Date and Time: 02/26/2020 1:41 PM      Attending Physician: Noman Rivas MD     Discharge Provider: Rashaun Alex MD    Diagnoses:  Active Hospital Problems    Diagnosis  POA    *Left renal mass [N28.89]  Yes    Anemia in chronic kidney disease (CKD) [N18.9, D63.1]  Yes    Secondary hyperparathyroidism [N25.81]  Yes    GERD (gastroesophageal reflux disease) [K21.9]  Yes    Dyslipidemia [E78.5]  Yes     Chronic    Essential hypertension [I10]  Yes    Diastolic dysfunction [I51.89]  Yes     Chronic     66 yo F with PMH CKD 2, HTN, DM2, CVA, OA presenting for follow up of her Hypertension. She denies any MCFADDEN, Chest pain or new symptoms except for mild leg swelling and some numbness in RUE. She takes her medications as prescribed. She tries to eat a healthy diet but notes it is "hard" and has active weight issues and diabetes.          PAD (peripheral artery disease) [I73.9]  Yes     Chronic    Morbid obesity with BMI of 50.0-59.9, adult [E66.01, Z68.43]  Not Applicable    Obstructive sleep apnea [G47.33]  Yes     Chronic    Type 2 diabetes mellitus with diabetic polyneuropathy, with long-term current use of insulin [E11.42, Z79.4]  Not Applicable    History of stroke [Z86.73]  Not Applicable      Resolved Hospital Problems   No resolved problems to display.       Discharged Condition: stable    Hospital Course: Patient was admitted for cysto RPG and tolerated the procedure well with no complications. She was discharged home in good condition on the same day.       Final Diagnoses: Same as principal problem.    Disposition: Home or Self Care    Follow up/Patient Instructions:    Medications:  Reconciled Home Medications:   Current Discharge Medication List      CONTINUE these medications which have NOT CHANGED    Details   allopurinol (ZYLOPRIM) 300 MG tablet Take 300 mg by mouth once daily.       amLODIPine (NORVASC) " 10 MG tablet Take 1 tablet (10 mg total) by mouth once daily.  Qty: 90 tablet, Refills: 3      aspirin 81 MG Chew Take 1 tablet (81 mg total) by mouth once daily.  Refills: 0      chlorthalidone (HYGROTEN) 25 MG Tab Take 1 tablet (25 mg total) by mouth once daily.  Qty: 30 tablet, Refills: 11      diphenhydrAMINE (BENADRYL) 25 mg capsule Take one hour prior to procedure  Qty: 2 capsule, Refills: 0    Associated Diagnoses: Left renal mass; Allergy to iodine      DULoxetine (CYMBALTA) 30 MG capsule Take 1 capsule (30 mg total) by mouth once daily.  Qty: 90 capsule, Refills: 3    Associated Diagnoses: Lumbar facet arthropathy; Spinal stenosis of lumbar region, unspecified whether neurogenic claudication present; Diabetic autonomic neuropathy associated with type 2 diabetes mellitus; Chronic low back pain with sciatica, sciatica laterality unspecified, unspecified back pain laterality; Primary osteoarthritis of right knee; Polyneuropathy associated with underlying disease      famotidine (PEPCID) 20 MG tablet Take 1 tablet (20 mg total) by mouth 2 (two) times daily.  Qty: 1 tablet, Refills: 0      insulin NPH-insulin regular, 70/30, (NOVOLIN 70/30 U-100 INSULIN) 100 unit/mL (70-30) injection INJECT 120 UNITS INTO THE SKIN WITH BREAKFAST, 110 UNITS WITH LUNCH, AND INJECT 90 UNITS WITH DINNER ;   Qty: 30 mL, Refills: 0    Associated Diagnoses: Type 2 diabetes mellitus with diabetic polyneuropathy, with long-term current use of insulin      irbesartan (AVAPRO) 300 MG tablet Take 1 tablet (300 mg total) by mouth every evening.  Qty: 90 tablet, Refills: 3      labetalol (NORMODYNE) 300 MG tablet Take 1 tablet (300 mg total) by mouth 2 (two) times daily.  Qty: 180 tablet, Refills: 3      multivitamin (THERAGRAN) per tablet Take 1 tablet by mouth once daily.      nystatin (MYCOSTATIN) powder Apply topically 3 (three) times daily as needed.  Qty: 60 g, Refills: 2      predniSONE (DELTASONE) 50 MG Tab 1. Take 1 tab 13  "hours before procedure. Then take 1 tab 7 hours before procedure. Then take 1 tab 1 hour before procedure.  Qty: 3 tablet, Refills: 0    Associated Diagnoses: Left renal mass; Allergy to iodine      pregabalin (LYRICA) 150 MG capsule Take 1 capsule (150 mg total) by mouth 2 (two) times daily.  Qty: 60 capsule, Refills: 5      semaglutide (OZEMPIC SUBQ) Inject into the skin.      simvastatin (ZOCOR) 40 MG tablet Take 1 tablet (40 mg total) by mouth every evening.  Qty: 90 tablet, Refills: 4    Comments: 90 day supply  Associated Diagnoses: Mixed hyperlipidemia      sodium bicarbonate 325 MG tablet Take 2 tablets (650 mg total) by mouth 2 (two) times daily.  Qty: 120 tablet, Refills: 11      traMADol (ULTRAM) 50 mg tablet Take 1 tablet (50 mg total) by mouth every 8 (eight) hours as needed.  Qty: 90 tablet, Refills: 3    Comments: Quantity prescribed more than 7 day supply? Yes, quantity medically necessary  Associated Diagnoses: Chronic low back pain with sciatica, sciatica laterality unspecified, unspecified back pain laterality; Spinal stenosis of lumbar region with neurogenic claudication; Lumbar facet arthropathy; Diabetic autonomic neuropathy associated with type 2 diabetes mellitus; Gait instability; Spinal stenosis of lumbar region, unspecified whether neurogenic claudication present; Fall (on)(from) incline, sequela; Polyneuropathy associated with underlying disease; Opioid use agreement exists      albuterol (PROAIR HFA) 90 mcg/actuation inhaler Inhale 2 puffs into the lungs every 6 (six) hours as needed for Wheezing. Rescue  Qty: 18 g, Refills: 6      BD ULTRA-FINE KIKA PEN NEEDLE 32 gauge x 5/32" Ndle To use 4 times daily  Qty: 200 each, Refills: 20    Associated Diagnoses: Type 2 diabetes mellitus with diabetic polyneuropathy      blood sugar diagnostic Strp 1 each by Misc.(Non-Drug; Combo Route) route 3 (three) times daily. Dx code  E11.42 . Contour Next  Qty: 120 each, Refills: 12    Associated " Diagnoses: Type 2 diabetes mellitus without complication, with long-term current use of insulin      !! blood-glucose meter kit Use as instructed, true test/result meter  Qty: 1 each, Refills: 0      !! blood-glucose meter kit 1 each by Other route once daily. Use daily. Insurance proffered one touch  E11.42  Qty: 1 each, Refills: 0      colchicine (COLCRYS) 0.6 mg tablet Take 0.6 mg by mouth as needed.      fexofenadine (ALLEGRA) 180 MG tablet TAKE 1 TABLET BY MOUTH ONCE DAILY  Qty: 30 tablet, Refills: 0    Associated Diagnoses: Allergy, subsequent encounter      fluocinonide (LIDEX) 0.05 % external solution AAA scalp qday - bid prn pruritus  Qty: 60 mL, Refills: 3    Associated Diagnoses: Hair loss disorder      glucagon, human recombinant, (GLUCAGON EMERGENCY KIT, HUMAN,) 1 mg SolR Inject 1 mg into the muscle as needed.  Qty: 1 each, Refills: 3    Associated Diagnoses: Type 2 diabetes mellitus with diabetic polyneuropathy, with long-term current use of insulin      hydrocortisone (ANUSOL-HC) 2.5 % rectal cream Place rectally 2 (two) times daily as needed for Hemorrhoids.  Qty: 28 g, Refills: 1      !! lancets 31 gauge Misc 1 lancet by Misc.(Non-Drug; Combo Route) route 4 (four) times daily. E11.42 . Prescribed one touch  Qty: 200 each, Refills: 6    Associated Diagnoses: Type 2 diabetes mellitus with diabetic polyneuropathy      !! lancets 33 gauge Misc 1 lancet by Misc.(Non-Drug; Combo Route) route 4 (four) times daily. Dx code E11.42  Qty: 200 each, Refills: 12       !! - Potential duplicate medications found. Please discuss with provider.        Discharge Procedure Orders   Call MD for:  temperature >100.4     Call MD for:  persistent nausea and vomiting or diarrhea     Call MD for:  severe uncontrolled pain     Call MD for:  difficulty breathing or increased cough     Call MD for:  severe persistent headache     Call MD for:  persistent dizziness, light-headedness, or visual disturbances     Call MD for:   increased confusion or weakness     No dressing needed     Activity as tolerated     Follow-up Information     [facility] 4th Floor.    Specialty:  Urology  Contact information:  [facility]  [facility] [address] [phone]  Additional information:  [facility] 4th [facility]

## 2020-02-27 LAB — POCT GLUCOSE: 204 MG/DL (ref 70–110)

## 2020-03-11 RX ORDER — ALLOPURINOL 300 MG/1
TABLET ORAL
Qty: 90 TABLET | Refills: 3 | Status: SHIPPED | OUTPATIENT
Start: 2020-03-11 | End: 2021-02-18

## 2020-03-12 RX ORDER — COLCHICINE 0.6 MG/1
TABLET, FILM COATED ORAL
Qty: 45 TABLET | Refills: 3 | Status: SHIPPED | OUTPATIENT
Start: 2020-03-12 | End: 2021-03-25

## 2020-03-13 ENCOUNTER — LAB VISIT (OUTPATIENT)
Dept: LAB | Facility: HOSPITAL | Age: 70
End: 2020-03-13
Attending: NURSE PRACTITIONER
Payer: MEDICARE

## 2020-03-13 DIAGNOSIS — E11.42 TYPE 2 DIABETES MELLITUS WITH DIABETIC POLYNEUROPATHY, WITH LONG-TERM CURRENT USE OF INSULIN: ICD-10-CM

## 2020-03-13 DIAGNOSIS — Z79.4 TYPE 2 DIABETES MELLITUS WITH DIABETIC POLYNEUROPATHY, WITH LONG-TERM CURRENT USE OF INSULIN: ICD-10-CM

## 2020-03-13 LAB
ALBUMIN SERPL BCP-MCNC: 3.3 G/DL (ref 3.5–5.2)
ALP SERPL-CCNC: 104 U/L (ref 55–135)
ALT SERPL W/O P-5'-P-CCNC: 16 U/L (ref 10–44)
ANION GAP SERPL CALC-SCNC: 9 MMOL/L (ref 8–16)
AST SERPL-CCNC: 21 U/L (ref 10–40)
BILIRUB SERPL-MCNC: 0.2 MG/DL (ref 0.1–1)
BUN SERPL-MCNC: 30 MG/DL (ref 8–23)
CALCIUM SERPL-MCNC: 9.3 MG/DL (ref 8.7–10.5)
CHLORIDE SERPL-SCNC: 107 MMOL/L (ref 95–110)
CO2 SERPL-SCNC: 25 MMOL/L (ref 23–29)
CREAT SERPL-MCNC: 1.7 MG/DL (ref 0.5–1.4)
EST. GFR  (AFRICAN AMERICAN): 34.7 ML/MIN/1.73 M^2
EST. GFR  (NON AFRICAN AMERICAN): 30.1 ML/MIN/1.73 M^2
GLUCOSE SERPL-MCNC: 226 MG/DL (ref 70–110)
POTASSIUM SERPL-SCNC: 4.3 MMOL/L (ref 3.5–5.1)
PROT SERPL-MCNC: 5.9 G/DL (ref 6–8.4)
SODIUM SERPL-SCNC: 141 MMOL/L (ref 136–145)

## 2020-03-13 PROCEDURE — 80053 COMPREHEN METABOLIC PANEL: CPT

## 2020-03-13 PROCEDURE — 36415 COLL VENOUS BLD VENIPUNCTURE: CPT | Mod: PO

## 2020-03-13 PROCEDURE — 83036 HEMOGLOBIN GLYCOSYLATED A1C: CPT

## 2020-03-14 LAB
ESTIMATED AVG GLUCOSE: 177 MG/DL (ref 68–131)
HBA1C MFR BLD HPLC: 7.8 % (ref 4–5.6)

## 2020-03-17 ENCOUNTER — PATIENT MESSAGE (OUTPATIENT)
Dept: HEMATOLOGY/ONCOLOGY | Facility: CLINIC | Age: 70
End: 2020-03-17

## 2020-03-17 ENCOUNTER — TELEPHONE (OUTPATIENT)
Dept: ENDOCRINOLOGY | Facility: CLINIC | Age: 70
End: 2020-03-17

## 2020-03-17 NOTE — TELEPHONE ENCOUNTER
Called pt and left VM to call back regarding switch follow up visit to virtual visit.  Contact no was provided.

## 2020-03-18 ENCOUNTER — TELEPHONE (OUTPATIENT)
Dept: ENDOCRINOLOGY | Facility: CLINIC | Age: 70
End: 2020-03-18

## 2020-03-18 ENCOUNTER — TELEPHONE (OUTPATIENT)
Dept: HEMATOLOGY/ONCOLOGY | Facility: CLINIC | Age: 70
End: 2020-03-18

## 2020-03-18 DIAGNOSIS — D64.9 ANEMIA, UNSPECIFIED TYPE: Primary | ICD-10-CM

## 2020-03-18 NOTE — TELEPHONE ENCOUNTER
Reached out to patient to discuss implementations our clinic is taken into effect given current Covid19 progression in our community.  Advised patient that as a routine follow up options for visit are either virtual or delaying if patient stable.   Patient preferred to delay. She was rescheduled 3 months out. Advised patient would arrange her appointments and mail her out an updated appointment slip.  Apologized for the inconvenience and advised her to contact our clinic for any new concerns she may have going forward.  She voiced understanding.

## 2020-03-18 NOTE — TELEPHONE ENCOUNTER
----- Message from Beryl Guzman MA sent at 3/17/2020  4:39 PM CDT -----  Contact: pt      ----- Message -----  From: Cara Huertas  Sent: 3/17/2020   3:54 PM CDT  To: [facility] Staff    Pt  Returning your call   [phone]    Thanks

## 2020-03-24 ENCOUNTER — TELEPHONE (OUTPATIENT)
Dept: ENDOCRINOLOGY | Facility: CLINIC | Age: 70
End: 2020-03-24

## 2020-03-24 NOTE — TELEPHONE ENCOUNTER
----- Message from Missy Cornelius sent at 3/24/2020 11:22 AM CDT -----  Contact: self  Pt is asking for a call back in regards to her medication. semaglutide (OZEMPIC SUBQ)    Contact Info [phone] (home)

## 2020-03-26 ENCOUNTER — TELEPHONE (OUTPATIENT)
Dept: ENDOCRINOLOGY | Facility: CLINIC | Age: 70
End: 2020-03-26

## 2020-03-26 NOTE — TELEPHONE ENCOUNTER
----- Message from Shawanda Cisneros sent at 3/26/2020  3:23 PM CDT -----  Contact: [phone]  Pt states they call got disconnected please call back to discuss

## 2020-03-27 ENCOUNTER — TELEPHONE (OUTPATIENT)
Dept: ENDOCRINOLOGY | Facility: CLINIC | Age: 70
End: 2020-03-27

## 2020-03-27 DIAGNOSIS — E11.42 TYPE 2 DIABETES MELLITUS WITH DIABETIC POLYNEUROPATHY: ICD-10-CM

## 2020-03-27 DIAGNOSIS — E11.42 TYPE 2 DIABETES MELLITUS WITH DIABETIC POLYNEUROPATHY, WITH LONG-TERM CURRENT USE OF INSULIN: ICD-10-CM

## 2020-03-27 DIAGNOSIS — Z79.4 TYPE 2 DIABETES MELLITUS WITH DIABETIC POLYNEUROPATHY, WITH LONG-TERM CURRENT USE OF INSULIN: ICD-10-CM

## 2020-03-27 RX ORDER — PEN NEEDLE, DIABETIC 31 GX5/16"
NEEDLE, DISPOSABLE MISCELLANEOUS
Qty: 200 EACH | Refills: 20 | Status: SHIPPED | OUTPATIENT
Start: 2020-03-27 | End: 2021-09-13 | Stop reason: SDUPTHER

## 2020-03-27 NOTE — TELEPHONE ENCOUNTER
----- Message from Tequila Benton MA sent at 3/26/2020  3:56 PM CDT -----  Contact: Self [phone]      ----- Message -----  From: Shawanda Cisneros  Sent: 3/26/2020   3:52 PM CDT  To: [facility] Staff    Pt states she is returning nurse phone call please call back to discuss

## 2020-03-27 NOTE — TELEPHONE ENCOUNTER
----- Message from Lorraine Harris sent at 3/27/2020 11:28 AM CDT -----  Contact: Debora     tel:  [phone]   Pt. Is in Infusion.   At the [facility] waiting for her .    She can be reached  At :    [phone] .    Caller says she has made several calls to  Our office already this a.m.   Caller is asking if you can speak with her about   OZEMPIC,   After Wednesday, will be out of this.   Using [facility].    Wants a 3 mos. Supply.   Pls call ref. This.    You gave her a sample.   Had no problems with this.

## 2020-04-03 ENCOUNTER — TELEPHONE (OUTPATIENT)
Dept: ENDOCRINOLOGY | Facility: CLINIC | Age: 70
End: 2020-04-03

## 2020-04-06 ENCOUNTER — TELEPHONE (OUTPATIENT)
Dept: ENDOCRINOLOGY | Facility: CLINIC | Age: 70
End: 2020-04-06

## 2020-04-06 NOTE — TELEPHONE ENCOUNTER
Contacted [facility] pharmacy to verified a dose and quantity  for Novolin 70/30 U- Flex pen.

## 2020-04-06 NOTE — TELEPHONE ENCOUNTER
----- Message from Cristin Alicea sent at 4/6/2020  8:07 AM CDT -----  Contact: Oneil Sneed- Vanessa Sneed needs a clarification for  Novolin (dosage info) . Please give [facility] a call back at [phone] . reference # [id number]

## 2020-04-27 ENCOUNTER — TELEPHONE (OUTPATIENT)
Dept: PODIATRY | Facility: CLINIC | Age: 70
End: 2020-04-27

## 2020-04-27 NOTE — TELEPHONE ENCOUNTER
Spoke with pt about scheduling a visit for 4/28/2020.----- Message from Leah Bailey sent at 4/27/2020 11:59 AM CDT -----  Contact: pt  Pt would like to be called back regarding her toe nail on right foot came off while lotioning her feet  Pt can be reached at [phone]

## 2020-04-27 NOTE — TELEPHONE ENCOUNTER
Nurse spoke to pt regarding appt request. Pt booked for tomorrow because she said her toenail came off and she has concerns regarding that. Pt will be in [place] around scheduled time.         ----- Message from Magui Hinds MA sent at 4/27/2020  3:02 PM CDT -----  Contact: pt      ----- Message -----  From: Leah Bailey  Sent: 4/27/2020  11:59 AM CDT  To: Junoir Maguire    Pt would like to be called back regarding her toe nail on right foot came off while lotioning her feet  Pt can be reached at [phone]

## 2020-04-28 ENCOUNTER — OFFICE VISIT (OUTPATIENT)
Dept: PODIATRY | Facility: CLINIC | Age: 70
End: 2020-04-28
Payer: MEDICARE

## 2020-04-28 VITALS
HEIGHT: 66 IN | WEIGHT: 293 LBS | SYSTOLIC BLOOD PRESSURE: 122 MMHG | DIASTOLIC BLOOD PRESSURE: 53 MMHG | BODY MASS INDEX: 47.09 KG/M2 | HEART RATE: 72 BPM

## 2020-04-28 DIAGNOSIS — B35.1 ONYCHOMYCOSIS DUE TO DERMATOPHYTE: ICD-10-CM

## 2020-04-28 DIAGNOSIS — E11.42 DIABETIC POLYNEUROPATHY ASSOCIATED WITH TYPE 2 DIABETES MELLITUS: Primary | ICD-10-CM

## 2020-04-28 PROCEDURE — 99999 PR PBB SHADOW E&M-EST. PATIENT-LVL III: ICD-10-PCS | Mod: PBBFAC,,, | Performed by: PODIATRIST

## 2020-04-28 PROCEDURE — 11721 DEBRIDE NAIL 6 OR MORE: CPT | Mod: S$PBB,59,Q9, | Performed by: PODIATRIST

## 2020-04-28 PROCEDURE — 11056 PARNG/CUTG B9 HYPRKR LES 2-4: CPT | Mod: S$PBB,Q9,, | Performed by: PODIATRIST

## 2020-04-28 PROCEDURE — 11056 PR TRIM BENIGN HYPERKERATOTIC SKIN LESION,2-4: ICD-10-PCS | Mod: S$PBB,Q9,, | Performed by: PODIATRIST

## 2020-04-28 PROCEDURE — 99999 PR PBB SHADOW E&M-EST. PATIENT-LVL III: CPT | Mod: PBBFAC,,, | Performed by: PODIATRIST

## 2020-04-28 PROCEDURE — 99213 OFFICE O/P EST LOW 20 MIN: CPT | Mod: PBBFAC | Performed by: PODIATRIST

## 2020-04-28 PROCEDURE — 11721 DEBRIDE NAIL 6 OR MORE: CPT | Mod: PBBFAC | Performed by: PODIATRIST

## 2020-04-28 PROCEDURE — 11721 PR DEBRIDEMENT OF NAILS, 6 OR MORE: ICD-10-PCS | Mod: S$PBB,59,Q9, | Performed by: PODIATRIST

## 2020-04-28 PROCEDURE — 99213 PR OFFICE/OUTPT VISIT, EST, LEVL III, 20-29 MIN: ICD-10-PCS | Mod: 25,S$PBB,, | Performed by: PODIATRIST

## 2020-04-28 PROCEDURE — 99213 OFFICE O/P EST LOW 20 MIN: CPT | Mod: 25,S$PBB,, | Performed by: PODIATRIST

## 2020-04-28 PROCEDURE — 11056 PARNG/CUTG B9 HYPRKR LES 2-4: CPT | Mod: PBBFAC | Performed by: PODIATRIST

## 2020-04-28 RX ORDER — PREGABALIN 150 MG/1
150 CAPSULE ORAL 2 TIMES DAILY
Qty: 180 CAPSULE | Refills: 4 | Status: SHIPPED | OUTPATIENT
Start: 2020-04-28 | End: 2020-10-17

## 2020-04-28 RX ORDER — KETOCONAZOLE 20 MG/G
CREAM TOPICAL DAILY
Qty: 1 TUBE | Refills: 2 | Status: SHIPPED | OUTPATIENT
Start: 2020-04-28 | End: 2020-11-17 | Stop reason: SDUPTHER

## 2020-04-29 NOTE — PROGRESS NOTES
Subjective:      Patient ID: Annika Mac is a 69 y.o. female.    Chief Complaint: Diabetes Mellitus (01/22/2020 dr ammie cotton) and Diabetic Foot Exam    Annika is a 69 y.o. female who presents to the clinic for evaluation and treatment of high risk feet. Annika has a past medical history of Allergy, Anemia (7/27/2012), Anticoagulant long-term use, Arthritis, Asthma, CHF (congestive heart failure), CKD (chronic kidney disease) stage 3, GFR 30-59 ml/min (7/27/2012), Clotting disorder, Colon polyp, Deep vein thrombophlebitis of left leg (7/27/2012), Degenerative disc disease, Diabetic peripheral neuropathy associated with type 2 diabetes mellitus (7/27/2012), GERD (gastroesophageal reflux disease), HTN (hypertension) (7/27/2012), Hyperlipidemia (7/27/2012), Lead-induced gout of ankle or foot, Nonproliferative diabetic retinopathy of right eye (7/27/2012), Obstructive sleep apnea (7/27/2012), Osteoporosis, Proliferative diabetic retinopathy of left eye (7/27/2012), Stroke (8/1/2003), Type 2 diabetes mellitus with diabetic polyneuropathy (7/27/2012), and Ulcer. The patient's chief complaint is long, thick toenails and calluses on both feet . No other major pedal complaintsThis patient has documented high risk feet requiring routine maintenance secondary to diabetes mellitis and those secondary complications of diabetes, as mentioned..  She has had an increase in neuropathy pain bilaterally and is in need of a new prescription for diabetic shoe gear as well as diabetic foot evaluation and routine care..       PCP: Mamie Cotton MD    Date Last Seen by PCP:   Chief Complaint   Patient presents with    Diabetes Mellitus     01/22/2020 dr mamie cotton    Diabetic Foot Exam           Chief Complaint   Patient presents with    Diabetes Mellitus     01/22/2020 dr mamie cotton    Diabetic Foot Exam       Current shoe gear: black leather DM shoes w/ custom inserts     Hemoglobin A1C   Date Value Ref Range Status    03/13/2020 7.8 (H) 4.0 - 5.6 % Final     Comment:     ADA Screening Guidelines:  5.7-6.4%  Consistent with prediabetes  >or=6.5%  Consistent with diabetes  High levels of fetal hemoglobin interfere with the HbA1C  assay. Heterozygous hemoglobin variants (HbS, HgC, etc)do  not significantly interfere with this assay.   However, presence of multiple variants may affect accuracy.     11/13/2019 8.2 (H) 4.0 - 5.6 % Final     Comment:     ADA Screening Guidelines:  5.7-6.4%  Consistent with prediabetes  >or=6.5%  Consistent with diabetes  High levels of fetal hemoglobin interfere with the HbA1C  assay. Heterozygous hemoglobin variants (HbS, HgC, etc)do  not significantly interfere with this assay.   However, presence of multiple variants may affect accuracy.     08/05/2019 8.8 (H) 4.0 - 5.6 % Final     Comment:     ADA Screening Guidelines:  5.7-6.4%  Consistent with prediabetes  >or=6.5%  Consistent with diabetes  High levels of fetal hemoglobin interfere with the HbA1C  assay. Heterozygous hemoglobin variants (HbS, HgC, etc)do  not significantly interfere with this assay.   However, presence of multiple variants may affect accuracy.         Review of Systems   Constitution: Negative for chills, decreased appetite and fever.   Cardiovascular: Negative for leg swelling.   Skin: Positive for dry skin and nail changes. Negative for color change, flushing, itching, poor wound healing, rash and skin cancer.   Musculoskeletal: Positive for arthritis. Negative for back pain, gout, joint pain, joint swelling and myalgias.   Gastrointestinal: Negative for nausea and vomiting.   Neurological: Positive for numbness. Negative for loss of balance and paresthesias.           Objective:      Physical Exam   Constitutional: She is oriented to person, place, and time. She appears well-developed and well-nourished. No distress.   Cardiovascular:   Dorsalis pedis and posterior tibial pulses are diminished Bilaterally. Toes are cool to  touch. Feet are warm proximally.There is decreased digital hair. Skin is atrophic, slightly hyperpigmented, and mildly edematous       Musculoskeletal: Normal range of motion. She exhibits no edema, tenderness or deformity.   Equinus noted b/l ankles, gastroc. Adequate joint range of motion without pain, limitation, nor crepitation Bilateral pedal joints. Muscle strength is 5/5 in all groups bilaterally.        Neurological: She is alert and oriented to person, place, and time.   Van Horne-Vincent 5.07 monofilamant testing is diminished [place] feet. Sharp/dull sensation diminished Bilaterally. Light touch absent Bilaterally.       Skin: Skin is warm, dry and intact. No abrasion, no bruising, no burn, no ecchymosis, no laceration, no lesion, no petechiae and no rash noted. She is not diaphoretic. No pallor.   Nails 1-5 b/l  are elongated by  2-6 mm's, thickened by 3-5 mm's, dystrophic, and are darkened in  coloration . Xerosis Bilaterally. No open lesions noted.      Hyperkeratotic tissue noted to distal 2-4 toe tips b/l.   Psychiatric: She has a normal mood and affect. Thought content normal.   Nursing note and vitals reviewed.            Assessment:       Encounter Diagnoses   Name Primary?    Diabetic polyneuropathy associated with type 2 diabetes mellitus Yes    Onychomycosis due to dermatophyte          Plan:       Annika was seen today for diabetes mellitus and diabetic foot exam.    Diagnoses and all orders for this visit:    Diabetic polyneuropathy associated with type 2 diabetes mellitus    Onychomycosis due to dermatophyte    Other orders  -     pregabalin (LYRICA) 150 MG capsule; Take 1 capsule (150 mg total) by mouth 2 (two) times daily.  -     ketoconazole (NIZORAL) 2 % cream; Apply topically once daily.          Routine Foot Care    Performed by:  Jean Carlos Pacheco. DPM  Authorized by:  Patient     Consent Done?:  Yes (Verbal)     Nail Care Type:  Debride  Location(s): All  (Left 1st Toe, Left 3rd Toe, Left  2nd Toe, Left 4th Toe, Left 5th Toe, Right 1st Toe, Right 2nd Toe, Right 3rd Toe, Right 4th Toe and Right 5th Toe)  Patient tolerance:  Patient tolerated the procedure well with no immediate complications     With patient's permission, the toenails mentioned above were aggressively reduced and debrided using a nail nipper, removing all offending nail and debris. The patient will continue to monitor the areas daily, inspect the feet, wear protective shoe gear when ambulatory, and moisturizer to maintain skin integrity.      Callus Care Type: Debride    With patient's permission, the calluses/hyperkeratotic lesions mentioned above were aggressively reduced and debrided using a number 15 blade. The patient will continue to monitor the areas daily, inspect the feet, wear protective shoe gear when ambulatory, and moisturizer to maintain skin integrity.       I counseled the patient on her conditions, their implications and medical management.        - Shoe inspection. Diabetic Foot Education. Patient reminded of the importance of good nutrition and blood sugar control to help prevent podiatric complications of diabetes. Patient instructed on proper foot hygeine. We discussed wearing proper shoe gear, daily foot inspections, never walking without protective shoe gear, never putting sharp instruments to feet, routine podiatric nail visits every 3 months.

## 2020-05-06 ENCOUNTER — OFFICE VISIT (OUTPATIENT)
Dept: INTERNAL MEDICINE | Facility: CLINIC | Age: 70
End: 2020-05-06
Payer: MEDICARE

## 2020-05-06 VITALS
BODY MASS INDEX: 48.82 KG/M2 | HEART RATE: 85 BPM | HEIGHT: 65 IN | DIASTOLIC BLOOD PRESSURE: 70 MMHG | WEIGHT: 293 LBS | SYSTOLIC BLOOD PRESSURE: 132 MMHG

## 2020-05-06 DIAGNOSIS — N18.4 TYPE 2 DM WITH CKD STAGE 4 AND HYPERTENSION: ICD-10-CM

## 2020-05-06 DIAGNOSIS — S99.929S: ICD-10-CM

## 2020-05-06 DIAGNOSIS — Z79.4 TYPE 2 DIABETES MELLITUS WITH DIABETIC POLYNEUROPATHY, WITH LONG-TERM CURRENT USE OF INSULIN: ICD-10-CM

## 2020-05-06 DIAGNOSIS — M48.061 SPINAL STENOSIS OF LUMBAR REGION, UNSPECIFIED WHETHER NEUROGENIC CLAUDICATION PRESENT: ICD-10-CM

## 2020-05-06 DIAGNOSIS — E11.22 TYPE 2 DM WITH CKD STAGE 4 AND HYPERTENSION: ICD-10-CM

## 2020-05-06 DIAGNOSIS — E78.5 HYPERLIPIDEMIA, UNSPECIFIED HYPERLIPIDEMIA TYPE: ICD-10-CM

## 2020-05-06 DIAGNOSIS — I12.9 TYPE 2 DM WITH CKD STAGE 4 AND HYPERTENSION: ICD-10-CM

## 2020-05-06 DIAGNOSIS — E11.42 TYPE 2 DIABETES MELLITUS WITH DIABETIC POLYNEUROPATHY, WITH LONG-TERM CURRENT USE OF INSULIN: ICD-10-CM

## 2020-05-06 DIAGNOSIS — I10 ESSENTIAL HYPERTENSION: ICD-10-CM

## 2020-05-06 DIAGNOSIS — N28.9 KIDNEY LESION: ICD-10-CM

## 2020-05-06 DIAGNOSIS — M25.512 LEFT SHOULDER PAIN, UNSPECIFIED CHRONICITY: Primary | ICD-10-CM

## 2020-05-06 PROCEDURE — 99214 OFFICE O/P EST MOD 30 MIN: CPT | Mod: S$PBB,,, | Performed by: INTERNAL MEDICINE

## 2020-05-06 PROCEDURE — 99999 PR PBB SHADOW E&M-EST. PATIENT-LVL III: CPT | Mod: PBBFAC,,, | Performed by: INTERNAL MEDICINE

## 2020-05-06 PROCEDURE — 99214 PR OFFICE/OUTPT VISIT, EST, LEVL IV, 30-39 MIN: ICD-10-PCS | Mod: S$PBB,,, | Performed by: INTERNAL MEDICINE

## 2020-05-06 PROCEDURE — 99213 OFFICE O/P EST LOW 20 MIN: CPT | Mod: PBBFAC | Performed by: INTERNAL MEDICINE

## 2020-05-06 PROCEDURE — 99999 PR PBB SHADOW E&M-EST. PATIENT-LVL III: ICD-10-PCS | Mod: PBBFAC,,, | Performed by: INTERNAL MEDICINE

## 2020-05-06 RX ORDER — AMOXICILLIN AND CLAVULANATE POTASSIUM 500; 125 MG/1; MG/1
1 TABLET, FILM COATED ORAL 2 TIMES DAILY
Qty: 14 TABLET | Refills: 0 | Status: SHIPPED | OUTPATIENT
Start: 2020-05-06 | End: 2020-05-13

## 2020-05-06 RX ORDER — BACITRACIN ZINC 500 UNIT/G
OINTMENT (GRAM) TOPICAL 2 TIMES DAILY PRN
Qty: 28 G | Refills: 1 | Status: SHIPPED | OUTPATIENT
Start: 2020-05-06 | End: 2020-08-19 | Stop reason: ALTCHOICE

## 2020-05-06 NOTE — PROGRESS NOTES
"Subjective:       Patient ID: Annika Mac is a 69 y.o. female.    Chief Complaint: Follow-up   This is a 69-year-old who presents today for follow-up she reports that she has been doing better since last visit she has been compliant with her diabetes medicines and she has been started on Ozempic which she is tolerating following with Endocrinology and has had improvement in her diabetes over time.  She has had some issues with her right foot in her toenail was bothering her it came off and she has seen her podiatrist was placed on some backside trace and zinc ointment but she ran out of that the samples that she had and she is now having some discomfort in the corner of the toenail.  She does plan to make a follow-up with her podiatrist.  She reports that her bowels have been doing better and occasionally though she is having gas more than she used to.  No belly pain associated.  She does have an area on her left shoulder that she has noticed that is kind of prominent not sure if it is a sister bone but she has discomfort at times drainage raising that arm in certain positions she did have a fall previously when she injured her hands but not sure how long the lump was there she thinks it has been there for a few months now and does seem to be decreasing a bit in size.  She has had a previous rotator cuff issues with that shoulder    HPI  Review of Systems   Gastrointestinal:        Gassy at times    Musculoskeletal:        Left shoulder pain    Toenail bothering her had removed  Some exudate and pain nail corner        Objective:     Blood pressure 132/70, pulse 85, height 5' 5" (1.651 m), weight (!) 141.9 kg (312 lb 13.3 oz).  seated In wheelchair   Physical Exam   Constitutional: No distress.   HENT:   Head: Normocephalic.   Mouth/Throat: Oropharynx is clear and moist.   Eyes: No scleral icterus.   Neck: Neck supple.   Cardiovascular: Normal rate and regular rhythm. Exam reveals no gallop and no friction rub. "   Murmur heard.  Pulmonary/Chest: Effort normal and breath sounds normal. No respiratory distress.   Abdominal: Soft. Bowel sounds are normal. She exhibits no mass. There is no tenderness.   Musculoskeletal: She exhibits no edema.   Left shoulder palbable mobile area  Shoulder possible cyst or bony prominance noted  Some discomfort rom    Neurological: She is alert.   Skin: No erythema.   Psychiatric: She has a normal mood and affect.   Vitals reviewed.      Assessment:       1. Left shoulder pain, unspecified chronicity    2. Lump    3. 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 Hyperlipidemia, unspecified hyperlipidemia type    5. Spinal stenosis of lumbar region, unspecified whether neurogenic claudication present    6. Type 2 diabetes mellitus with diabetic polyneuropathy, with long-term current use of insulin    7. Essential hypertension    8. Kidney lesion    9. Injury of toenail, unspecified laterality, sequela        Plan:       Annika was seen today for follow-up.    Diagnoses and all orders for this visit:    Left shoulder pain, unspecified chronicity  -     Ambulatory referral/consult to Orthopedics; Future  -     X-Ray Shoulder Trauma 3 view Left; Future    Lump  -     Ambulatory referral/consult to Orthopedics; Future  -     X-Ray Shoulder Trauma 3 view Left; Future    Type 2 DM with CKD stage 4 and hypertension  Blood sugar has improved control she has been following with Endocrinology  Was recently started on Ozempic with some improvement in her A1c she will continue to monitor her numbers        Hyperlipidemia, unspecified hyperlipidemia type    Spinal stenosis of lumbar region, unspecified whether neurogenic claudication present  She continues to follow with pain management    Type 2 diabetes mellitus with diabetic polyneuropathy, with long-term current use of insulin    Essential hypertension  Blood pressure has been well controlled    Kidney lesion  History of she has seen urology and they  planned continued surveillance at at this time    Ft pain with toenail injury all placed her on Augmentin for of 7 days to be cautious due to discomfort   -     amoxicillin-clavulanate 500-125mg (AUGMENTIN) 500-125 mg Tab; Take 1 tablet (500 mg total) by mouth 2 (two) times daily. for 7 days  -     bacitracin 500 unit/gram Oint; Apply topically 2 (two) times daily as needed.  Prescription provided for her to get to use topically  If symptoms persist she was also encouraged to make a follow-up with her podiatrist

## 2020-05-08 ENCOUNTER — HOSPITAL ENCOUNTER (OUTPATIENT)
Dept: RADIOLOGY | Facility: HOSPITAL | Age: 70
Discharge: HOME | End: 2020-05-08
Attending: INTERNAL MEDICINE
Payer: MEDICARE

## 2020-05-08 DIAGNOSIS — M25.512 LEFT SHOULDER PAIN, UNSPECIFIED CHRONICITY: ICD-10-CM

## 2020-05-08 PROCEDURE — 73030 XR SHOULDER TRAUMA 3 VIEW LEFT: ICD-10-PCS | Mod: 26,LT,, | Performed by: RADIOLOGY

## 2020-05-08 PROCEDURE — 73030 X-RAY EXAM OF SHOULDER: CPT | Mod: 26,LT,, | Performed by: RADIOLOGY

## 2020-05-08 PROCEDURE — 73030 X-RAY EXAM OF SHOULDER: CPT | Mod: TC,LT

## 2020-05-22 ENCOUNTER — PATIENT MESSAGE (OUTPATIENT)
Dept: RHEUMATOLOGY | Facility: CLINIC | Age: 70
End: 2020-05-22

## 2020-05-22 ENCOUNTER — OFFICE VISIT (OUTPATIENT)
Dept: ORTHOPEDICS | Facility: CLINIC | Age: 70
End: 2020-05-22
Payer: MEDICARE

## 2020-05-22 VITALS — WEIGHT: 293 LBS | HEIGHT: 65 IN | BODY MASS INDEX: 48.82 KG/M2

## 2020-05-22 DIAGNOSIS — M25.512 LEFT SHOULDER PAIN, UNSPECIFIED CHRONICITY: ICD-10-CM

## 2020-05-22 DIAGNOSIS — R22.30 MASS OF SHOULDER REGION: ICD-10-CM

## 2020-05-22 PROCEDURE — 99213 PR OFFICE/OUTPT VISIT, EST, LEVL III, 20-29 MIN: ICD-10-PCS | Mod: S$PBB,,, | Performed by: PHYSICIAN ASSISTANT

## 2020-05-22 PROCEDURE — 99999 PR PBB SHADOW E&M-EST. PATIENT-LVL V: CPT | Mod: PBBFAC,,, | Performed by: PHYSICIAN ASSISTANT

## 2020-05-22 PROCEDURE — 99999 PR PBB SHADOW E&M-EST. PATIENT-LVL V: ICD-10-PCS | Mod: PBBFAC,,, | Performed by: PHYSICIAN ASSISTANT

## 2020-05-22 PROCEDURE — 99215 OFFICE O/P EST HI 40 MIN: CPT | Mod: PBBFAC | Performed by: PHYSICIAN ASSISTANT

## 2020-05-22 PROCEDURE — 99213 OFFICE O/P EST LOW 20 MIN: CPT | Mod: S$PBB,,, | Performed by: PHYSICIAN ASSISTANT

## 2020-05-22 NOTE — LETTER
May 22, 2020      Mamie Cotton MD  [address]  [facility] [address]           [facility]  [address]  [facility] [address]  Phone: [phone]          Patient: Annika Mac   MR Number: [id number]   YOB: 1950   Date of Visit: 5/22/2020       Dear Dr. Mamie Cotton:    Thank you for referring Annika Mac to me for evaluation. Attached you will find relevant portions of my assessment and plan of care.    If you have questions, please do not hesitate to call me. I look forward to following Annika Mac along with you.    Sincerely,    Homero Leggett PA-C    Enclosure  CC:  No Recipients    If you would like to receive this communication electronically, please contact [email or web address] or [phone] to request more information on [employer] Link access.    For providers and/or their staff who would like to refer a patient to [facility], please contact us through our one-stop-shop provider referral line, [facility] , at [phone].    If you feel you have received this communication in error or would no longer like to receive these types of communications, please e-mail [email or web address]

## 2020-05-22 NOTE — PROGRESS NOTES
"  SUBJECTIVE:     Chief Complaint & History of Present Illness:  Annika Mac is a  Established  patient 69 y.o. female who is seen here today with a complaint of    Chief Complaint   Patient presents with    Left Shoulder - Pain    .  She is a patient well-known to me was last seen treated the clinic by me 02/13/2019 for instability of the left shoulder.  She has also been seen by Spine Service last visit being 04/12/2019 for cervical radiculopathy.  With left-sided involvement.  Her concerns today revolve around increasing soreness in the shoulder and the development of a palpable movable mass in the medial superior portion of the shoulder that has some tenderness to palpation and she feels is fluctuating in size and causing difficulty with some of her activities and range of motion.  Seen by her primary care for this condition 05/06/2020 x-rays taken at that time here today for further care and treatment  On a scale of 1-10, with 10 being worst pain imaginable, he rates this pain as 2 on good days and 6 on bad days.  she describes the pain as tender and sore.    Review of patient's allergies indicates:   Allergen Reactions    Iodinated contrast media Rash         Current Outpatient Medications   Medication Sig Dispense Refill    albuterol (PROAIR HFA) 90 mcg/actuation inhaler Inhale 2 puffs into the lungs every 6 (six) hours as needed for Wheezing. Rescue 18 g 6    allopurinoL (ZYLOPRIM) 300 MG tablet TAKE 1 TABLET DAILY 90 tablet 3    amLODIPine (NORVASC) 10 MG tablet Take 1 tablet (10 mg total) by mouth once daily. 90 tablet 3    aspirin 81 MG Chew Take 1 tablet (81 mg total) by mouth once daily.  0    bacitracin 500 unit/gram Oint Apply topically 2 (two) times daily as needed. 28 g 1    BD ULTRA-FINE KIKA PEN NEEDLE 32 gauge x 5/32" Ndle To use 4 times daily 200 each 20    blood sugar diagnostic Strp 1 each by Misc.(Non-Drug; Combo Route) route 3 (three) times daily. Dx code  E11.42 . Contour Next 120 " each 12    blood-glucose meter kit Use as instructed, true test/result meter 1 each 0    blood-glucose meter kit 1 each by Other route once daily. Use daily. Insurance proffered one touch  E11.42 1 each 0    chlorthalidone (HYGROTEN) 25 MG Tab Take 1 tablet (25 mg total) by mouth once daily. 30 tablet 11    COLCRYS 0.6 mg tablet TAKE 1 TABLET EVERY OTHER DAY 45 tablet 3    DULoxetine (CYMBALTA) 30 MG capsule Take 1 capsule (30 mg total) by mouth once daily. 90 capsule 3    fexofenadine (ALLEGRA) 180 MG tablet TAKE 1 TABLET BY MOUTH ONCE DAILY (Patient taking differently: Take by mouth. Uses as needed) 30 tablet 0    fluocinonide (LIDEX) 0.05 % external solution AAA scalp qday - bid prn pruritus 60 mL 3    glucagon, human recombinant, (GLUCAGON EMERGENCY KIT, HUMAN,) 1 mg SolR Inject 1 mg into the muscle as needed. 1 each 3    hydrocortisone (ANUSOL-HC) 2.5 % rectal cream Place rectally 2 (two) times daily as needed for Hemorrhoids. 28 g 1    insulin NPH-insulin regular, 70/30, (NOVOLIN 70/30 U-100 INSULIN) 100 unit/mL (70-30) injection 120 UNITS INTO THE SKIN WITH BREAKFAST, 110 UNITS WITH LUNCH, AND INJECT 90 UNITS WITH DINNER ; . 30 mL 3    irbesartan (AVAPRO) 300 MG tablet Take 1 tablet (300 mg total) by mouth every evening. 90 tablet 3    ketoconazole (NIZORAL) 2 % cream Apply topically once daily. 1 Tube 2    labetalol (NORMODYNE) 300 MG tablet Take 1 tablet (300 mg total) by mouth 2 (two) times daily. 180 tablet 3    lancets 31 gauge Misc 1 lancet by Misc.(Non-Drug; Combo Route) route 4 (four) times daily. E11.42 . Prescribed one touch 200 each 6    lancets 33 gauge Misc 1 lancet by Misc.(Non-Drug; Combo Route) route 4 (four) times daily. Dx code E11.42 200 each 12    multivitamin (THERAGRAN) per tablet Take 1 tablet by mouth once daily.      nystatin (MYCOSTATIN) powder Apply topically 3 (three) times daily as needed. 60 g 2    pregabalin (LYRICA) 150 MG capsule Take 1 capsule (150 mg  total) by mouth 2 (two) times daily. 60 capsule 5    pregabalin (LYRICA) 150 MG capsule Take 1 capsule (150 mg total) by mouth 2 (two) times daily. 180 capsule 4    semaglutide (OZEMPIC) 0.25 mg or 0.5 mg(2 mg/1.5 mL) PnIj Inject 0.5 mg into the skin every 7 days. 4 Syringe 3    simvastatin (ZOCOR) 40 MG tablet Take 1 tablet (40 mg total) by mouth every evening. 90 tablet 4    sodium bicarbonate 325 MG tablet Take 2 tablets (650 mg total) by mouth 2 (two) times daily. 120 tablet 11    famotidine (PEPCID) 20 MG tablet Take 1 tablet (20 mg total) by mouth 2 (two) times daily. 1 tablet 0    traMADol (ULTRAM) 50 mg tablet Take 1 tablet (50 mg total) by mouth every 8 (eight) hours as needed. 90 tablet 3     No current facility-administered medications for this visit.        Past Medical History:   Diagnosis Date    Allergy     Anemia 2012    Anticoagulant long-term use     Arthritis     Asthma     CHF (congestive heart failure)     CKD (chronic kidney disease) stage 3, GFR 30-59 ml/min 2012    Clotting disorder     Colon polyp     Deep vein thrombophlebitis of left leg 2012    Degenerative disc disease     Diabetic peripheral neuropathy associated with type 2 diabetes mellitus 2012    GERD (gastroesophageal reflux disease)     HTN (hypertension) 2012    Hyperlipidemia 2012    Lead-induced gout of ankle or foot     right going on three weeks    Nonproliferative diabetic retinopathy of right eye 2012    Obstructive sleep apnea 2012    Osteoporosis     Proliferative diabetic retinopathy of left eye 2012    Stroke 2003    Type 2 diabetes mellitus with diabetic polyneuropathy 2012    Ulcer        Past Surgical History:   Procedure Laterality Date    CATARACT EXTRACTION W/  INTRAOCULAR LENS IMPLANT Left 3/2002    left     CATARACT EXTRACTION W/  INTRAOCULAR LENS IMPLANT Right     OD dr. olivares     SECTION       COLONOSCOPY N/A 2/26/2018    Procedure: COLONOSCOPY;  Surgeon: Faizan Mac MD;  [facility]  Service: Endoscopy;  Laterality: N/A;    CYST REMOVAL  2/11/2011    left side of face    CYSTOSCOPY W/ RETROGRADES Left 2/26/2020    Procedure: CYSTOSCOPY, WITH RETROGRADE PYELOGRAM;  Surgeon: Noman Rivas MD;  Location[facility] OR [address] [place] [address];  Service: Urology;  Laterality: Left;  30min    EYE SURGERY Bilateral 2012    eye implants    GANGLION CYST EXCISION  11/13/2007    Right wrist    Pan Retinal Photocoagulation Bilateral     Dr. Monterroso (Proliferative Diabetic Retinopathy)    TOTAL ABDOMINAL HYSTERECTOMY  1982    TOTAL KNEE ARTHROPLASTY  2/2006    left    TRIGGER FINGER RELEASE  9/18/2009       Vital Signs (Most Recent)  There were no vitals filed for this visit.    Review of Systems:  ROS:  Constitutional: no fever or chills, Morbid obesity BMI 52.09  Eyes: no visual changes, Diabetic retinopathy  ENT: no nasal congestion or sore throat  Respiratory: no cough or shortness of breath, Positive history of asthma  Cardiovascular: no chest pain or palpitations, Positive P 80, CHF, diastolic dysfunction  Gastrointestinal: no nausea or vomiting, tolerating diet, Positive for GERD  Genitourinary: no hematuria or dysuria, Positive CKD stage 4  Integument/Breast: no rash or pruritis  Hematologic/Lymphatic: no easy bruising or lymphadenopathy, Positive for iron deficiency anemia, clotting disorder, long-term use of anticoagulation, history deep vein thrombophlebitis of the left leg  Musculoskeletal: Positive for let induced gout, lumbar facet atrophy, chronic low back pain, osteoarthritis of multiple lower extremities  Neurological: Positive for polyneuropathy secondary to diabetes, degenerative disc disease, history of stroke, lumbar spinal stenosis, cervical myopathy,  Behavioral/Psych: no auditory or visual hallucinations  Endocrine: no heat or cold intolerance, Positive diabetes type 2 poorly  "controlled with polyneuropathy,  stage 4 kidney disease vitamin-D deficiency hyperglycemia      OBJECTIVE:     PHYSICAL EXAM:  Height: 5' 5" (165.1 cm) Weight: (!) 142 kg (313 lb 0.9 oz), General Appearance: Well nourished, well developed, in no acute distress.  Neurological: Mood & affect are normal.  Shoulder exam: left  Tenderness: AC joint, globally palpable mass with tenderness at the midportion of the superior clavicle approximately 2 cm in diameter  ROM: forward flexion 180/180, extension 45/45, full abduction 180/180, abduction-glenohumeral 90/90, external rotation 50/50, pain at the extremes of mobility  Shoulder Strength: biceps 5/5, triceps 5/5, abduction 5/5, adduction 5/5, external rotation 5/5 with shoulder at side, flexion 5/5, and extension 5/5  negative for tenderness about the glenohumeral joint, positive for tenderness over the acromioclavicular joint and positive for impingement sign  Stability tests: anterior apprehension test negative and posterior apprehension test positive for pain only  Special Tests:Cross-chest abduction: diffuse pain                     RADIOGRAPHS:  X-rays from previous visit reviewed by me today demonstrate no evidence of fracture dislocation mild arthritic changes throughout the shoulder with narrowing of the AC space    ASSESSMENT/PLAN:       ICD-10-CM ICD-9-CM   1. Left shoulder pain, unspecified chronicity M25.512 719.41   2. Lump R22.9 782.2   3. Mass of shoulder region R22.30 719.61       Plan: We discussed with the patient at length all the different treatment options available for her leftshoulder including anti-inflammatories, acetaminophen, rest, ice, Physical therapy to include strengthening exercise, occasional cortisone injections for temporary relief, arthroscopic surgical repair, and finally shoulder arthroplasty.   Light of patient's positive exam and history will proceed with MRI of the left shoulder I will contact the patient following the MRI to " discuss results and treatment plans moving forward

## 2020-05-29 ENCOUNTER — HOSPITAL ENCOUNTER (OUTPATIENT)
Dept: RADIOLOGY | Facility: HOSPITAL | Age: 70
Discharge: HOME | End: 2020-05-29
Attending: PHYSICIAN ASSISTANT
Payer: MEDICARE

## 2020-05-29 DIAGNOSIS — R22.30 MASS OF SHOULDER REGION: ICD-10-CM

## 2020-05-29 PROCEDURE — 73221 MRI SHOULDER WITHOUT CONTRAST LEFT: ICD-10-PCS | Mod: 26,LT,, | Performed by: RADIOLOGY

## 2020-05-29 PROCEDURE — 73221 MRI JOINT UPR EXTREM W/O DYE: CPT | Mod: TC,LT

## 2020-05-29 PROCEDURE — 73221 MRI JOINT UPR EXTREM W/O DYE: CPT | Mod: 26,LT,, | Performed by: RADIOLOGY

## 2020-06-02 ENCOUNTER — TELEPHONE (OUTPATIENT)
Dept: ORTHOPEDICS | Facility: CLINIC | Age: 70
End: 2020-06-02

## 2020-06-02 NOTE — TELEPHONE ENCOUNTER
Spoke with Annika Terrell stating BRIGETTE Leggett return her call twice on 6-1-20 , regarding her MRI results the person who anwer the phone stated she wasn,t home

## 2020-06-02 NOTE — TELEPHONE ENCOUNTER
----- Message from Krissy Hilton sent at 6/2/2020  3:47 PM CDT -----  Contact: self  Pt is calling again regarding her MRI results ask for a call due to will not be home on tomorrow and would like her results today     Contact info  [phone] (home)

## 2020-06-11 ENCOUNTER — OFFICE VISIT (OUTPATIENT)
Dept: SPORTS MEDICINE | Facility: CLINIC | Age: 70
End: 2020-06-11
Payer: MEDICARE

## 2020-06-11 VITALS
WEIGHT: 293 LBS | HEART RATE: 77 BPM | HEIGHT: 65 IN | BODY MASS INDEX: 48.82 KG/M2 | DIASTOLIC BLOOD PRESSURE: 60 MMHG | SYSTOLIC BLOOD PRESSURE: 125 MMHG

## 2020-06-11 DIAGNOSIS — M71.312 OTHER BURSAL CYST, LEFT SHOULDER: ICD-10-CM

## 2020-06-11 DIAGNOSIS — M75.112 PARTIAL NONTRAUMATIC TEAR OF ROTATOR CUFF, LEFT: ICD-10-CM

## 2020-06-11 DIAGNOSIS — M25.512 CHRONIC LEFT SHOULDER PAIN: ICD-10-CM

## 2020-06-11 DIAGNOSIS — M67.922 BICEPS TENDINOPATHY, LEFT: ICD-10-CM

## 2020-06-11 DIAGNOSIS — G89.29 CHRONIC LEFT SHOULDER PAIN: ICD-10-CM

## 2020-06-11 DIAGNOSIS — M19.012 ARTHRITIS OF LEFT ACROMIOCLAVICULAR JOINT: Primary | ICD-10-CM

## 2020-06-11 PROCEDURE — 99204 PR OFFICE/OUTPT VISIT, NEW, LEVL IV, 45-59 MIN: ICD-10-PCS | Mod: 25,S$PBB,, | Performed by: ORTHOPAEDIC SURGERY

## 2020-06-11 PROCEDURE — 99204 OFFICE O/P NEW MOD 45 MIN: CPT | Mod: 25,S$PBB,, | Performed by: ORTHOPAEDIC SURGERY

## 2020-06-11 PROCEDURE — 20605 INTERMEDIATE JOINT ASPIRATION/INJECTION: L ACROMIOCLAVICULAR: ICD-10-PCS | Mod: S$PBB,LT,, | Performed by: ORTHOPAEDIC SURGERY

## 2020-06-11 PROCEDURE — 99999 PR PBB SHADOW E&M-EST. PATIENT-LVL III: ICD-10-PCS | Mod: PBBFAC,,, | Performed by: ORTHOPAEDIC SURGERY

## 2020-06-11 PROCEDURE — 20605 DRAIN/INJ JOINT/BURSA W/O US: CPT | Mod: PBBFAC | Performed by: ORTHOPAEDIC SURGERY

## 2020-06-11 PROCEDURE — 99999 PR PBB SHADOW E&M-EST. PATIENT-LVL III: CPT | Mod: PBBFAC,,, | Performed by: ORTHOPAEDIC SURGERY

## 2020-06-11 PROCEDURE — 99213 OFFICE O/P EST LOW 20 MIN: CPT | Mod: PBBFAC,25 | Performed by: ORTHOPAEDIC SURGERY

## 2020-06-11 RX ADMIN — TRIAMCINOLONE ACETONIDE 40 MG: 40 INJECTION, SUSPENSION INTRA-ARTICULAR; INTRAMUSCULAR at 11:06

## 2020-06-11 NOTE — LETTER
June 17, 2020      Homero Leggett PA-C  [address]  [place] [address]           [facility]  [address]  [facility] [address]  Phone: [phone]          Patient: Annika Mac   MR Number: [id number]   YOB: 1950   Date of Visit: 6/11/2020       Dear Homero Leggett:    Thank you for referring Annika Mac to me for evaluation. Attached you will find relevant portions of my assessment and plan of care.    If you have questions, please do not hesitate to call me. I look forward to following Annika Mac along with you.    Sincerely,    IGLESIA Garcia MD    Enclosure  CC:  No Recipients    If you would like to receive this communication electronically, please contact [email or web address] or [phone] to request more information on [employer] Link access.    For providers and/or their staff who would like to refer a patient to [facility], please contact us through our one-stop-shop provider referral line, [facility], at [phone].    If you feel you have received this communication in error or would no longer like to receive these types of communications, please e-mail [email or web address]

## 2020-06-11 NOTE — PROGRESS NOTES
CC: Left shoulder pain     69 y.o. RHD Female presents as a new patient to me. She is retired. Complaint is left shoulder pain x three months, gradual onset. No history of specific antecedent trauma. Pain localizes anteriorly over the AC joint and biceps tendon.  There is a soft tissue mass/cyst over the AC joint which is bothersome particularly with use of a bra and reaching across herself.  Worse with overhead motion. Pain is disruptive of sleep at night. Better with rest. Treatment thus far has included activity modifications, rest, and oral medication.  Takes Tramadol prescribed by her rheumatologist for her legs.  Here today to discuss diagnosis and treatment options. Referred to me by Homero Leggett.    Denies neck pain or radicular symptoms.     PMHx notable for .   Negative for tobacco.   Positive for diabetes. Last A1C 7.8, March 2020.    Pain Score:   8    PAST MEDICAL HISTORY:   Past Medical History:   Diagnosis Date    Allergy     Anemia 7/27/2012    Anticoagulant long-term use     Arthritis     Asthma     CHF (congestive heart failure)     CKD (chronic kidney disease) stage 3, GFR 30-59 ml/min 7/27/2012    Clotting disorder     Colon polyp     Deep vein thrombophlebitis of left leg 7/27/2012    Degenerative disc disease     Diabetic peripheral neuropathy associated with type 2 diabetes mellitus 7/27/2012    GERD (gastroesophageal reflux disease)     HTN (hypertension) 7/27/2012    Hyperlipidemia 7/27/2012    Lead-induced gout of ankle or foot     right going on three weeks    Nonproliferative diabetic retinopathy of right eye 7/27/2012    Obstructive sleep apnea 7/27/2012    Osteoporosis     Proliferative diabetic retinopathy of left eye 7/27/2012    Stroke 8/1/2003    Type 2 diabetes mellitus with diabetic polyneuropathy 7/27/2012    Ulcer      PAST SURGICAL HISTORY:  Past Surgical History:   Procedure Laterality Date    CATARACT EXTRACTION W/  INTRAOCULAR LENS IMPLANT Left 3/2002     left     CATARACT EXTRACTION W/  INTRAOCULAR LENS IMPLANT Right     OD dr. olivares     SECTION      COLONOSCOPY N/A 2018    Procedure: COLONOSCOPY;  Surgeon: Faizan Mac MD;  [facility]  Service: Endoscopy;  Laterality: N/A;    CYST REMOVAL  2011    left side of face    CYSTOSCOPY W/ RETROGRADES Left 2020    Procedure: CYSTOSCOPY, WITH RETROGRADE PYELOGRAM;  Surgeon: Noman Rivas MD;  Location: [facility] OR [address] [place] [address];  Service: Urology;  Laterality: Left;  30min    EYE SURGERY Bilateral 2012    eye implants    GANGLION CYST EXCISION  2007    Right wrist    Pan Retinal Photocoagulation Bilateral     Dr. Monterroso (Proliferative Diabetic Retinopathy)    TOTAL ABDOMINAL HYSTERECTOMY      TOTAL KNEE ARTHROPLASTY  2006    left    TRIGGER FINGER RELEASE  2009     FAMILY HISTORY:  Family History   Problem Relation Age of Onset    Diabetes Mother     Hypertension Mother     Heart disease Father     Diabetes Sister     Thyroid disease Brother     Diabetes Brother     Hypertension Brother     No Known Problems Daughter     No Known Problems Son     No Known Problems Daughter     Amblyopia Neg Hx     Blindness Neg Hx     Glaucoma Neg Hx     Macular degeneration Neg Hx     Retinal detachment Neg Hx     Strabismus Neg Hx     Colon cancer Neg Hx     Esophageal cancer Neg Hx      MEDICATIONS:    Current Outpatient Medications:     albuterol (PROAIR HFA) 90 mcg/actuation inhaler, Inhale 2 puffs into the lungs every 6 (six) hours as needed for Wheezing. Rescue, Disp: 18 g, Rfl: 6    allopurinoL (ZYLOPRIM) 300 MG tablet, TAKE 1 TABLET DAILY, Disp: 90 tablet, Rfl: 3    amLODIPine (NORVASC) 10 MG tablet, Take 1 tablet (10 mg total) by mouth once daily., Disp: 90 tablet, Rfl: 3    aspirin 81 MG Chew, Take 1 tablet (81 mg total) by mouth once daily., Disp: , Rfl: 0    bacitracin 500 unit/gram Oint, Apply topically 2 (two) times  "daily as needed., Disp: 28 g, Rfl: 1    BD ULTRA-FINE KIKA PEN NEEDLE 32 gauge x 5/32" Ndle, To use 4 times daily, Disp: 200 each, Rfl: 20    blood sugar diagnostic Strp, 1 each by Misc.(Non-Drug; Combo Route) route 3 (three) times daily. Dx code  E11.42 . Contour Next, Disp: 120 each, Rfl: 12    blood-glucose meter kit, Use as instructed, true test/result meter, Disp: 1 each, Rfl: 0    blood-glucose meter kit, 1 each by Other route once daily. Use daily. Insurance proffered one touch  E11.42, Disp: 1 each, Rfl: 0    chlorthalidone (HYGROTEN) 25 MG Tab, Take 1 tablet (25 mg total) by mouth once daily., Disp: 30 tablet, Rfl: 11    COLCRYS 0.6 mg tablet, TAKE 1 TABLET EVERY OTHER DAY, Disp: 45 tablet, Rfl: 3    DULoxetine (CYMBALTA) 30 MG capsule, Take 1 capsule (30 mg total) by mouth once daily., Disp: 90 capsule, Rfl: 3    famotidine (PEPCID) 20 MG tablet, Take 1 tablet (20 mg total) by mouth 2 (two) times daily., Disp: 1 tablet, Rfl: 0    fexofenadine (ALLEGRA) 180 MG tablet, TAKE 1 TABLET BY MOUTH ONCE DAILY (Patient taking differently: Take by mouth. Uses as needed), Disp: 30 tablet, Rfl: 0    fluocinonide (LIDEX) 0.05 % external solution, AAA scalp qday - bid prn pruritus, Disp: 60 mL, Rfl: 3    glucagon, human recombinant, (GLUCAGON EMERGENCY KIT, HUMAN,) 1 mg SolR, Inject 1 mg into the muscle as needed., Disp: 1 each, Rfl: 3    hydrocortisone (ANUSOL-HC) 2.5 % rectal cream, Place rectally 2 (two) times daily as needed for Hemorrhoids., Disp: 28 g, Rfl: 1    insulin NPH-insulin regular, 70/30, (NOVOLIN 70/30 U-100 INSULIN) 100 unit/mL (70-30) injection, 120 UNITS INTO THE SKIN WITH BREAKFAST, 110 UNITS WITH LUNCH, AND INJECT 90 UNITS WITH DINNER ; ., Disp: 30 mL, Rfl: 3    irbesartan (AVAPRO) 300 MG tablet, Take 1 tablet (300 mg total) by mouth every evening., Disp: 90 tablet, Rfl: 3    ketoconazole (NIZORAL) 2 % cream, Apply topically once daily., Disp: 1 Tube, Rfl: 2    labetalol " "(NORMODYNE) 300 MG tablet, Take 1 tablet (300 mg total) by mouth 2 (two) times daily., Disp: 180 tablet, Rfl: 3    lancets 31 gauge Misc, 1 lancet by Misc.(Non-Drug; Combo Route) route 4 (four) times daily. E11.42 . Prescribed one touch, Disp: 200 each, Rfl: 6    lancets 33 gauge Misc, 1 lancet by Misc.(Non-Drug; Combo Route) route 4 (four) times daily. Dx code E11.42, Disp: 200 each, Rfl: 12    multivitamin (THERAGRAN) per tablet, Take 1 tablet by mouth once daily., Disp: , Rfl:     nystatin (MYCOSTATIN) powder, Apply topically 3 (three) times daily as needed., Disp: 60 g, Rfl: 2    pregabalin (LYRICA) 150 MG capsule, Take 1 capsule (150 mg total) by mouth 2 (two) times daily., Disp: 180 capsule, Rfl: 4    semaglutide (OZEMPIC) 0.25 mg or 0.5 mg(2 mg/1.5 mL) PnIj, Inject 0.5 mg into the skin every 7 days., Disp: 4 Syringe, Rfl: 3    simvastatin (ZOCOR) 40 MG tablet, Take 1 tablet (40 mg total) by mouth every evening., Disp: 90 tablet, Rfl: 4    sodium bicarbonate 325 MG tablet, Take 2 tablets (650 mg total) by mouth 2 (two) times daily., Disp: 120 tablet, Rfl: 11    traMADol (ULTRAM) 50 mg tablet, Take 1 tablet (50 mg total) by mouth every 8 (eight) hours as needed., Disp: 90 tablet, Rfl: 3    ALLERGIES:  Review of patient's allergies indicates:   Allergen Reactions    Iodinated contrast media Rash     REVIEW OF SYSTEMS:  Constitution: Negative. Negative for chills, fever and night sweats.    Hematologic/Lymphatic: Negative for bleeding problem. Does not bruise/bleed easily.   Skin: Negative for dry skin, itching and rash.   Musculoskeletal: Negative for falls. Positive for left shoulder pain and muscle weakness.     All other review of symptoms were reviewed and found to be noncontributory.    PHYSICAL EXAMINATION:  Vitals:  /60   Pulse 77   Ht 5' 5" (1.651 m)   Wt (!) 142 kg (313 lb)   BMI 52.09 kg/m²    General: Well-developed well-nourished 69 y.o. femalein no acute distress "   Cardiovascular: Regular rhythm by palpation of distal pulse, normal color and temperature, no concerning varicosities on symptomatic side   Lungs: No labored breathing or wheezing appreciated   Neuro: Alert and oriented ×3   Psychiatric: well oriented to person, place and time, demonstrates normal mood and affect   Skin: No rashes, lesions or ulcers, normal temperature, turgor, and texture on uninvolved extremity    Ortho/SPM Exam  Examination of the left shoulder demonstrates active forward elevation to 90, ER with arm at side to 40. Passive FE to 100, ER to 45. Prominent tenderness along the proximal biceps tendon. Positive AC tenderness with a palpable subcutaneous cyst anterior and superior to the AC joint which is tender.  No overlying skin changes. 4-/5 resisted supraspinatus testing. 4-/5 resisted infraspinatus testing.  Limitation due to pain which she again localizes predominantly over the AC joint.  Mildly positive impingement signs.  Negative belly press test. Stable shoulder.     No midline neck tenderness. Negative Spurling's maneuver.     IMAGING:  Xrays including AP, Outlet and Axillary Lateral of Left shoulder are ordered / images reviewed by me:   Mild to moderate AC joint degenerative changes    MRI of Left shoulder reviewed by me and discussed with patient. Study shows:    1. High-grade partial-thickness rotator cuff tear, primary of the supraspinatus tendon with some involvement of the infraspinatus tendon, as above.   2. Prominent intra-articular biceps tendinosis.   3. Moderate AC joint arthropathy with 3 cm ganglion cyst noted superiorly.    ASSESSMENT:      ICD-10-CM ICD-9-CM   1. Arthritis of left acromioclavicular joint  M19.012 716.91   2. Other bursal cyst, left shoulder  M71.312 727.49   3. Biceps tendinopathy, left  M67.922 727.9   4. Partial nontraumatic tear of rotator cuff, left  M75.112 726.13   5. Chronic left shoulder pain  M25.512 719.41    G89.29 338.29     PLAN:     Findings  discussed with the patient.  The majority of her symptoms or focal to the AC joint with adjacent synovial cyst.  We elected for cyst aspiration with AC joint steroid injection.  This was performed.  3 cc of cystic clear fluid was aspirated from the cyst.  The patient tolerated the procedure well.  Discussed the avoidance of repetitive overhead activity, heavy lifting.  Physical therapy referral placed.  I will see her back as needed.    Intermediate Joint Aspiration/Injection: L acromioclavicular    Date/Time: 6/11/2020 11:00 AM  Performed by: IGLESIA Garcia MD  Authorized by: IGLESIA Garcia MD     Consent Done?: Yes (Verbal)  Indications: Pain  Site marked: The procedure site was marked    Timeout: Prior to procedure the correct patient, procedure, and site was verified      Location:  Shoulder  Site:  L acromioclavicular  Prep: Patient was prepped and draped in usual sterile fashion    Needle size:  22 G  Medications:  40 mg triamcinolone acetonide 40 mg/mL  Aspirate amount (ml):  3  Aspirate:  Clear  Patient tolerance:  Patient tolerated the procedure well with no immediate complications

## 2020-06-17 RX ORDER — TRIAMCINOLONE ACETONIDE 40 MG/ML
40 INJECTION, SUSPENSION INTRA-ARTICULAR; INTRAMUSCULAR
Status: DISCONTINUED | OUTPATIENT
Start: 2020-06-11 | End: 2020-06-17 | Stop reason: HOSPADM

## 2020-06-19 ENCOUNTER — LAB VISIT (OUTPATIENT)
Dept: LAB | Facility: HOSPITAL | Age: 70
End: 2020-06-19
Attending: INTERNAL MEDICINE
Payer: MEDICARE

## 2020-06-19 ENCOUNTER — OFFICE VISIT (OUTPATIENT)
Dept: HEMATOLOGY/ONCOLOGY | Facility: CLINIC | Age: 70
End: 2020-06-19
Payer: MEDICARE

## 2020-06-19 DIAGNOSIS — D64.9 ANEMIA, UNSPECIFIED TYPE: ICD-10-CM

## 2020-06-19 LAB
BASOPHILS # BLD AUTO: 0.02 K/UL (ref 0–0.2)
BASOPHILS NFR BLD: 0.3 % (ref 0–1.9)
DIFFERENTIAL METHOD: ABNORMAL
EOSINOPHIL # BLD AUTO: 0.3 K/UL (ref 0–0.5)
EOSINOPHIL NFR BLD: 4 % (ref 0–8)
ERYTHROCYTE [DISTWIDTH] IN BLOOD BY AUTOMATED COUNT: 15.8 % (ref 11.5–14.5)
FERRITIN SERPL-MCNC: 66 NG/ML (ref 20–300)
HCT VFR BLD AUTO: 28.2 % (ref 37–48.5)
HGB BLD-MCNC: 8.8 G/DL (ref 12–16)
IMM GRANULOCYTES # BLD AUTO: 0.04 K/UL (ref 0–0.04)
IMM GRANULOCYTES NFR BLD AUTO: 0.5 % (ref 0–0.5)
LYMPHOCYTES # BLD AUTO: 1.6 K/UL (ref 1–4.8)
LYMPHOCYTES NFR BLD: 20.8 % (ref 18–48)
MCH RBC QN AUTO: 28.7 PG (ref 27–31)
MCHC RBC AUTO-ENTMCNC: 31.2 G/DL (ref 32–36)
MCV RBC AUTO: 92 FL (ref 82–98)
MONOCYTES # BLD AUTO: 0.7 K/UL (ref 0.3–1)
MONOCYTES NFR BLD: 8.9 % (ref 4–15)
NEUTROPHILS # BLD AUTO: 4.9 K/UL (ref 1.8–7.7)
NEUTROPHILS NFR BLD: 65.5 % (ref 38–73)
NRBC BLD-RTO: 0 /100 WBC
PLATELET # BLD AUTO: 269 K/UL (ref 150–350)
PMV BLD AUTO: 11 FL (ref 9.2–12.9)
RBC # BLD AUTO: 3.07 M/UL (ref 4–5.4)
WBC # BLD AUTO: 7.49 K/UL (ref 3.9–12.7)

## 2020-06-19 PROCEDURE — 99213 PR OFFICE/OUTPT VISIT, EST, LEVL III, 20-29 MIN: ICD-10-PCS | Mod: S$PBB,,, | Performed by: INTERNAL MEDICINE

## 2020-06-19 PROCEDURE — 99999 PR PBB SHADOW E&M-EST. PATIENT-LVL II: CPT | Mod: PBBFAC,,, | Performed by: INTERNAL MEDICINE

## 2020-06-19 PROCEDURE — 99212 OFFICE O/P EST SF 10 MIN: CPT | Mod: PBBFAC | Performed by: INTERNAL MEDICINE

## 2020-06-19 PROCEDURE — 36415 COLL VENOUS BLD VENIPUNCTURE: CPT

## 2020-06-19 PROCEDURE — 99213 OFFICE O/P EST LOW 20 MIN: CPT | Mod: S$PBB,,, | Performed by: INTERNAL MEDICINE

## 2020-06-19 PROCEDURE — 82728 ASSAY OF FERRITIN: CPT

## 2020-06-19 PROCEDURE — 99999 PR PBB SHADOW E&M-EST. PATIENT-LVL II: ICD-10-PCS | Mod: PBBFAC,,, | Performed by: INTERNAL MEDICINE

## 2020-06-19 PROCEDURE — 85025 COMPLETE CBC W/AUTO DIFF WBC: CPT

## 2020-06-19 NOTE — PROGRESS NOTES
Subjective:       Patient ID: Annika Mac is a 69 y.o. female.    Chief Complaint: No chief complaint on file.    Anemia       Mrs. Mac is a 68 yo morbidly obese female who presents today in follow up for her longstanding anemia.      Multiple stool samples in the past have been negative for occult blood.  His CBC today shows a white count of 7,400 per cubic mm, hemoglobin 8.8 grams/deciliter, hematocrit 28.2%, MCV 92 and platelets are 269,000 per cubic mm.    Review of Systems  Overall she feels fair. She has longstanding arthritic pains which are no worse. She also mentions her longstanding constipation.  She denies any anxiety, depression, easy bruising, fevers, chills, night sweats, weight loss, nausea, vomiting, diarrhea, diplopia, blurred vision, epistaxis, hematuria, or headaches.      Objective:      Physical Exam  GENERAL: She is alert, oriented to time, place, person, pleasant, well nourished, in no acute physical distress. Presents in WC.   VITAL SIGNS: Reviewed.   HEENT: Normal. There are no nasal, oral, lip, gingival, auricular, lid, conjunctival lesions. Pupils are equal, reactive to light and   accommodation and extraocular muscle movements are intact. Mucosae are moist and pink, and there is no thrush.   NECK: Supple without JVD, adenopathy, or thyromegaly.   LUNGS: Clear to auscultation without wheezing, rales, or rhonchi.   CARDIOVASCULAR: Reveals an S1, S2, no murmurs, no rubs, no gallops.   ABDOMEN: Obese, soft, nontender, without organomegaly. Bowel sounds are present. A median abdominal scar is seen extending from the umbilicus to the symphysis pubis.   EXTREMITIES: No cyanosis or clubbing. There is 1(+) edema at the ankle level bilaterally.   SKIN: Does not have petechiae, rashes, induration, or ecchymoses.   NEUROLOGIC: Motor function is 5/5, DTRs are 0 to 1+ bilaterally, symmetrical, and cranial nerves within normal limits.   LYMPHATIC: There is no cervical, axillary, or supraclavicular  adenopathy.       Assessment:       1. Anemia, unspecified type           Plan:     Since her anemia is somewhat worse, I asked her to return in 2 months with a repeat CBC.  She will submit 3 more stool samples at that time.  She may need a more comprehensive anemia workup if her repeat H&H is in the same range.

## 2020-06-26 ENCOUNTER — PATIENT OUTREACH (OUTPATIENT)
Dept: ADMINISTRATIVE | Facility: OTHER | Age: 70
End: 2020-06-26

## 2020-06-26 NOTE — PROGRESS NOTES
Chart reviewed.   Immunizations: Triggered Imm Registry     Orders placed: n/a  Upcoming appts to satisfy KIRSTIN topics: n/a

## 2020-06-30 ENCOUNTER — CLINICAL SUPPORT (OUTPATIENT)
Dept: REHABILITATION | Facility: HOSPITAL | Age: 70
End: 2020-06-30
Payer: MEDICARE

## 2020-06-30 DIAGNOSIS — M25.512 CHRONIC LEFT SHOULDER PAIN: ICD-10-CM

## 2020-06-30 DIAGNOSIS — M62.81 MUSCLE WEAKNESS OF LEFT ARM: ICD-10-CM

## 2020-06-30 DIAGNOSIS — G89.29 CHRONIC LEFT SHOULDER PAIN: ICD-10-CM

## 2020-06-30 DIAGNOSIS — M25.612 DECREASED ROM OF LEFT SHOULDER: ICD-10-CM

## 2020-06-30 PROCEDURE — 97110 THERAPEUTIC EXERCISES: CPT | Mod: PO

## 2020-06-30 PROCEDURE — 97162 PT EVAL MOD COMPLEX 30 MIN: CPT | Mod: PO

## 2020-06-30 NOTE — PLAN OF CARE
[facility]  Physical Therapy Initial Evaluation    Date: 6/30/2020   Name: Annika Mac  Clinic Number: [id number]    Therapy Diagnosis:   Encounter Diagnoses   Name Primary?    Chronic left shoulder pain     Decreased ROM of left shoulder     Muscle weakness of left arm      Physician: IGLESIA Garcia MD    Physician Orders: PT Eval and Treat   Medical Diagnosis from Referral: M25.512,G89.29 (ICD-10-CM) - Chronic left shoulder pain   Evaluation Date: 6/30/2020  Authorization Period Expiration: 06/17/2021   Plan of Care Expiration: 8/30/2020  Visit # / Visits authorized: 1/ 1    Time In: 10:45 am  Time Out: 11:30 am  Total Appointment Time (timed & untimed codes): 45 minutes    Precautions: Standard and Fall    Subjective   Date of onset: March 2020  History of current condition - Annika reports: left shoulder pain three months ago after a fall with pain primarily over AC joint and anterior aspect of shoulder. Aleyda 3, pt fell while walking outside causing increase in left wrist and shoulder pain.  She has limited tolerance to reaching overhead, across body, dressing, performing normal house work, and sleeping.  Pt reports having injection and cyst drained in left shoulder that gave temporary relief.       Medical History:   Past Medical History:   Diagnosis Date    Allergy     Anemia 7/27/2012    Anticoagulant long-term use     Arthritis     Asthma     CHF (congestive heart failure)     CKD (chronic kidney disease) stage 3, GFR 30-59 ml/min 7/27/2012    Clotting disorder     Colon polyp     Deep vein thrombophlebitis of left leg 7/27/2012    Degenerative disc disease     Diabetic peripheral neuropathy associated with type 2 diabetes mellitus 7/27/2012    GERD (gastroesophageal reflux disease)     HTN (hypertension) 7/27/2012    Hyperlipidemia 7/27/2012    Lead-induced gout of ankle or foot     right going on three weeks    Nonproliferative diabetic retinopathy of right  eye 2012    Obstructive sleep apnea 2012    Osteoporosis     Proliferative diabetic retinopathy of left eye 2012    Stroke 2003    Type 2 diabetes mellitus with diabetic polyneuropathy 2012    Ulcer        Surgical History:   Annika Mac  has a past surgical history that includes Total knee arthroplasty (2006); Total abdominal hysterectomy (); Ganglion cyst excision (2007); Cyst Removal (2011); Trigger finger release (2009);  section; Cataract extraction w/  intraocular lens implant (Left, 3/2002); Cataract extraction w/  intraocular lens implant (Right, ); Pan Retinal Photocoagulation (Bilateral); Eye surgery (Bilateral, ); Colonoscopy (N/A, 2018); and Cystoscopy w/ retrogrades (Left, 2020).    Medications:   Annika has a current medication list which includes the following prescription(s): albuterol, allopurinol, amlodipine, aspirin, bacitracin, bd ultra-fine myesha pen needle, blood sugar diagnostic, blood-glucose meter, blood-glucose meter, chlorthalidone, colcrys, duloxetine, famotidine, fexofenadine, fluocinonide, glucagon (human recombinant), hydrocortisone, insulin nph-insulin regular (70/30), irbesartan, ketoconazole, labetalol, lancets, lancets, multivitamin, nystatin, pregabalin, semaglutide, simvastatin, sodium bicarbonate, tramadol, and insulin glargine (toujeo).    Allergies:   Review of patient's allergies indicates:   Allergen Reactions    Iodinated contrast media Rash        Imaging, MRI studies:   Impression:     High-grade partial-thickness rotator cuff tear, primary of the supraspinatus tendon with some involvement of the infraspinatus tendon, as above.     Prominent intra-articular biceps tendinosis.     Moderate AC joint arthropathy with 3 cm ganglion cyst noted superiorly.       Prior Therapy: yes  Social History: two story home lives with their spouse  Occupation: retired  Prior Level of Function:  independent  Current Level of Function: difficulty dressing, walking, and performing normal house work    Pain:  Current 7/10, worst 10/10, best 6/10   Location:   Left shoulder shoulder, elbow, and wrist  Description: Aching and Throbbing  Aggravating Factors: reaching, lifting, grasping, lying on left side  Easing Factors: heating pad and rest    Pts goals: decrease pain levels and use left arm normally     Objective     Observation: Pt is a 69 year old female that presents to therapy ambulating with cane in right hand, holding left arm in guarded position.  Pt apprehensive towards examination     Posture: forward head/rounded shoulder, holds left arm in guarded position      Passive Range of Motion: measured in supine  Shoulder Left Right   Flexion Pt would not allow 150   Abduction Pt would not allow 150   ER at 90 Pt would not allow 55   IR Pt would not allow 60      Active Range of Motion: measured in standing  Shoulder Left Right   Flexion 75 150   Abduction 70 160   ER at 0 Reach ear Reach T2   IR Reach L5 Reach T9     Upper Extremity Strength   (L) UE (R) UE   Shoulder flexion: 2/5 4-/5   Shoulder Abduction: 2/5 4-/5   Shoulder ER 3/5 4-/5   Shoulder IR 3/5 4-/5   Lower Trap 3/5 3+/5   Middle Trap 3/5 3+/5   Rhomboids 3/5 3+/5         Special Tests:  AC Joint Left Right   AC Joint Compression Test positive negative   Empty Can Test Unable to assess negative   O'cynthia's test Unable to assess negative   Neer's Test Unable to assess negative       Joint Mobility: unable to assess due to pain and voluntary muscle guarding    Palpation: hypersensitive to light touch of left shoulder complex, pt would not allow palpation during evaluation    Sensation: intact to light touch    Flexibility: tightness in pec major and pec minor        Limitation/Restriction for FOTO Shoulder Survey    Therapist reviewed FOTO scores for Annika Mac on 6/30/2020.   FOTO documents entered into [employer] - see Media section.    Limitation  "Score: pt did not complete survey        TREATMENT   Treatment Time In: 11:20 am  Treatment Time Out: 11:30 am  Total Treatment time (time-based codes) separate from Evaluation: 10 minutes    Annika received therapeutic exercises to develop strength, endurance, ROM, flexibility and posture for 0 minutes including:    Date  6/30/2020   VISIT 1/1       POC EXP. DATE 8/30/2020   VISIT AMOUNT  MEDICARE TOTAL 113.20   FACE-TO-FACE 7/30/2020   FOTO 1/5       Pulley into flexion next   TABLE:    Table slide: flexion 1 x 10   Table slide: abduction 1 x 10   Table slide: ER next   Scapular retractions 10 x 3"   Posterior shoulder roll next   Wand flexion next   Shoulder isometrics:  Flexion  Extension  IR  ER   1 x 10  1 x 10  1 x 10  1 x 10               STANDING:    Codman's 1'                           Initials MA       Home Exercises and Patient Education Provided    Education provided:   - HEP    Written Home Exercises Provided: yes.  Exercises were reviewed and Annika was able to demonstrate them prior to the end of the session.  Annika demonstrated fair  understanding of the education provided.     See EMR under Patient Instructions for exercises provided 6/30/2020.    Assessment   Annika is a 69 y.o. female referred to outpatient Physical Therapy with a medical diagnosis of chronic left shoulder pain. Pt presents with signs and symptoms consistent with the diagnosis.  She holds left arm in guarded position, apprehensive towards examination, limited AROM and PROM, with voluntary muscle guarding, limited strength.  Pt would benefit from manual therapy, AAROM, PROM, and AROM exercises, rotator cuff strengthening, scapular stabilization exercises to decrease pain, improve functional mobility and return to prior level of function.     Pt prognosis is Fair.   Pt will benefit from skilled outpatient Physical Therapy to address the deficits stated above and in the chart below, provide pt/family education, and to maximize pt's level " of independence.     Plan of care discussed with patient: Yes  Pt's spiritual, cultural and educational needs considered and patient is agreeable to the plan of care and goals as stated below:     Anticipated Barriers for therapy: transportation, pain tolerance, apprehension    Medical Necessity is demonstrated by the following  History  Co-morbidities and personal factors that may impact the plan of care Co-morbidities:   CHF, CKD stage 3, COPD/asthma, coping style/mechanism, diabetes, difficulty sleeping, high BMI, history of CVA and arthritis, osteoporosis    Personal Factors:   age  coping style     high   Examination  Body Structures and Functions, activity limitations and participation restrictions that may impact the plan of care Body Regions:   upper extremities    Body Systems:    ROM  strength  balance  gait  transfers  transitions    Participation Restrictions:   Wrist pain, poor balance, transportation      Activity limitations:   Learning and applying knowledge  no deficits    General Tasks and Commands  no deficits    Communication  no deficits    Mobility  lifting and carrying objects  fine hand use (grasping/picking up)  walking  using transportation (bus, train, plane, car)  driving (bike, car, motorcycle)    Self care  washing oneself (bathing, drying, washing hands)  dressing    Domestic Life  shopping  cooking  doing house work (cleaning house, washing dishes, laundry)  assisting others    Interactions/Relationships  no deficits    Life Areas  no deficits    Community and Social Life  community life  recreation and leisure         high   Clinical Presentation evolving clinical presentation with changing clinical characteristics moderate   Decision Making/ Complexity Score: moderate     Goals:  Short Term Goals:  4 weeks  1. Patient will be compliant with HEP to promote the independent management of current diagnosis.  2. Patient will increase left shoulder flexion to 90 degrees for function of  dressing.  3. Patient will increase left shoulder functional ER to reach C2 for function of grooming.  4. Patient will report a decrease in complaints of left shoulder pain to 6/10 during performance of ADL's for independence of self care activities.       Long Term Goals:  8 weeks  1. Patient will increase left shoulder strength to 4-/5 to improve tolerance to light house work.  2. Patient will increase left shoulder flexion to 135 degrees in order to place dishes in overhead cabinets independently for self care independence.   3. Patient will increase left shoulder functional IR to reach T7 for function of dressing.  4. Patient will increase scapular stabilization strength to 4/5 to improve tolerance to normal lifting.      Plan   Plan of care Certification: 6/30/2020 to 8/30/2020.    Outpatient Physical Therapy 2 times weekly for 8 weeks to include the following interventions: Gait Training, Manual Therapy, Moist Heat/ Ice, Neuromuscular Re-ed, Patient Education, Therapeutic Activites, Therapeutic Exercise and IASTM, vacuum cupping, and kinesiotaping.     Marino López, PT

## 2020-07-01 NOTE — PROGRESS NOTES
Subjective:      Patient ID: Annika Mac is a 69 y.o. female.    Chief Complaint:  Diabetes    History of Present Illness  Annika Mac presents today for follow up of T2DM     Since last visit has had multiple admissions, one for symptomatic hyperglycemia and then severe hypoglycemia last week  Has also been changed from U500 to 70/30 due to cost    With regards to the diabetes:    Diagnosed: 1987. Started oral agents then NPH and Regular insulin. Converted to MDI with Lantus and Novolog in 2/2006 after knee surgery. D/c TZD r/t weight gain 7/08 and added Symlin. Stopped Symlin 2/09. Januvia added 2/10. D/C Januvia 12/10 r/t cost; resumed though pt assistance in 2016. Converted to U500 in 7/10.     Hospitalized for hypoglycemia in June 2019     Professional CGMS done:  Unable to interpret CGM as seems to have fallen off after ~48 hours and limited data  Information available reviewed and episode of mild hypoglycemia overnight x 1 and after lunch x 1  Otherwise essentially global hyperglycemia  No insulin doses documented    Confirm compliance with dosing and then consider decrease in lunchtime and dinnertime insulin dose     Known complications:  DKA-  RN-  PN+  Nephropathy+    Current regimen:  Insulin 70/30:  Breakfast: 110 units for normal meal  Lunch: 90 units with normal meal  Dinner: 65 units for normal meal  -- if she is  before a meal she will decrease 1/2 the dose  ozempic once weekly 0.5 mg     She is having occ lows.   Glucose Monitor:  4-6 times a day testing and on dexcom  Log reviewed:       Hypoglycemia:  Yes overnight and after lunch   Knows how to correct with 15 grams of carbs- juice, coke, or glucose tablets.    Denies missed doses.  Has Medicare Extra Help     Eats 3 meals daily, + snacks  Drinks water, unsweet tea.   No formal exercise.    Education - last visit: 4/2019    Diabetes Management Status  Statin: Taking  ACE/ARB: Taking  Screening or Prevention Patient's value Goal  Complete/Controlled?   HgA1C Testing and Control   Lab Results   Component Value Date    HGBA1C 6.7 (H) 07/02/2020    Annually/Less than 8% No   Lipid profile : 11/13/2019 Annually Yes   LDL control Lab Results   Component Value Date    LDLCALC 66.4 11/13/2019    Annually/Less than 100 mg/dl  Yes   Nephropathy screening Lab Results   Component Value Date    LABMICR 27.0 05/01/2019     Lab Results   Component Value Date    PROTEINUA Negative 11/13/2019    Annually Yes   Blood pressure BP Readings from Last 1 Encounters:   07/07/20 110/60    Less than 140/90 Yes   Dilated retinal exam : 12/20/2019 Annually Yes   Foot exam   : 06/13/2019 Annually Yes     With regards to hyperparathyroidism:  Results for [facility] (MRN [id number]) as of 5/6/2019 17:07   Ref. Range 10/26/2017 10:37 3/13/2018 11:32 2/13/2019 16:20   PTH Latest Ref Range: 9.0 - 77.0 pg/mL 107.0 (H)  82.0 (H)   Results for [facility] (MRN [id number]) as of 5/6/2019 17:07   Ref. Range 4/4/2019 10:45 4/9/2019 09:48 5/1/2019 09:40   Calcium Latest Ref Range: 8.7 - 10.5 mg/dL 10.1 9.9 9.2   Albumin Latest Ref Range: 3.5 - 5.2 g/dL 3.7 3.6 3.3 (L)   Results for [facility] (MRN [id number]) as of 5/6/2019 17:07   Ref. Range 2/13/2019 16:20   Vit D, 25-Hydroxy Latest Ref Range: 30 - 96 ng/mL 40     BMD 6/9/17  Normal spine and hip BMD. FRAX calculation does not support treatment for osteoporosis. total hip BMD declined 3% since 2008 and 10% since 2002. Lumbar spine BMD unchanged.  Recommendations:  1) Adequate calcium and Vitamin D therapy  2) Appropriate exercise  3) No need to repeat BMD in near future unless clinical change    Review of Systems   Constitutional: Negative for unexpected weight change.   Eyes: Negative for visual disturbance.   Respiratory: Negative for shortness of breath.    Cardiovascular: Negative for chest pain.   Gastrointestinal: Negative for abdominal pain.   Endocrine: Negative for cold intolerance, heat intolerance, polydipsia, polyphagia  and polyuria.   Musculoskeletal: Negative for arthralgias.   Skin: Negative for wound.   Neurological: Negative for headaches.   Hematological: Does not bruise/bleed easily.   Psychiatric/Behavioral: Negative for sleep disturbance.     Objective:   Physical Exam  Vitals signs reviewed.   Neck:      Thyroid: No thyromegaly.   Cardiovascular:      Rate and Rhythm: Normal rate.      Comments: No edema present  Pulmonary:      Effort: Pulmonary effort is normal.   Abdominal:      Palpations: Abdomen is soft.     Appropriate footwear  No ulcers or wounds.   injection sites are ok. No lipo hypertropthy or atrophy    Body mass index is 51.25 kg/m².    Lab Review:   Lab Results   Component Value Date    HGBA1C 6.7 (H) 07/02/2020     Lab Results   Component Value Date    CHOL 143 11/13/2019    HDL 43 11/13/2019    LDLCALC 66.4 11/13/2019    TRIG 168 (H) 11/13/2019    CHOLHDL 30.1 11/13/2019     Lab Results   Component Value Date     07/02/2020    K 4.4 07/02/2020     07/02/2020    CO2 26 07/02/2020     (H) 07/02/2020    BUN 37 (H) 07/02/2020    CREATININE 2.1 (H) 07/02/2020    CALCIUM 9.2 07/02/2020    PROT 6.4 07/02/2020    ALBUMIN 3.4 (L) 07/02/2020    BILITOT 0.3 07/02/2020    ALKPHOS 130 07/02/2020    AST 22 07/02/2020    ALT 22 07/02/2020    ANIONGAP 7 (L) 07/02/2020    ESTGFRAFRICA 26.9 (A) 07/02/2020    EGFRNONAA 23.3 (A) 07/02/2020    TSH 1.423 05/01/2019     Assessment and Plan     1. Type 2 diabetes mellitus with diabetic polyneuropathy, with long-term current use of insulin  semaglutide (OZEMPIC) 0.25 mg or 0.5 mg(2 mg/1.5 mL) PnIj    Hemoglobin A1C    Comprehensive metabolic panel   2. 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 Morbid obesity with BMI of 45.0-49.9, adult     4. Type 2 diabetes mellitus without complication, with long-term current use of insulin  blood sugar diagnostic Strp   5. Type 2 diabetes mellitus with diabetic polyneuropathy  lancets 31 gauge Misc    semaglutide  (OZEMPIC) 0.25 mg or 0.5 mg(2 mg/1.5 mL) PnIj       T2DM with hyper and hypoglycemia  Recent ER visit due to hypoglycemia with loss of consciousness, unawareness  Significant insulin resistance with difficult to control glucose. She has improved greatly since getting the [employer]  She is having lows overnight & before dinner  Did well with U500 but unfortunately not able to afford at this time so on 70/30.     change current regimen:  Insulin 70/30:  Breakfast: 110 units   Lunch: 85 units   Dinner: 60 units  -- discussed to continue decreasing dose by 1/2 if BG <120 before a meal  continue ozempic asap       -- Reviewed goals of therapy are to get the best control we can without hypoglycemia  -- Reviewed patient's current insulin regimen. Clarified proper insulin dose and timing in relation to meals, etc. Insulin injection sites and proper rotation instructed.    -- Advised frequent self blood glucose monitoring.  Patient encouraged to document glucose results and bring them to every clinic visit    -- Hypoglycemia precautions discussed. Instructed on precautions before driving.    -- Call for Bg repeatedly < 90 or > 180.   -- Close adherence to lifestyle changes recommended.   -- Periodic follow ups for eye evaluations, foot care and dental care suggested.    Stage 4 CKD  Contributes to risk for hypoglycemia  Glycemic control as above  Cont to follow with nephrology    Morbid obesity  Significant insulin resistance  Discussed dietary modification    Follow up in about 3 months (around 10/7/2020).  Labs prior to next appointment with me     Seeing diabetes education today

## 2020-07-02 ENCOUNTER — OFFICE VISIT (OUTPATIENT)
Dept: ORTHOPEDICS | Facility: CLINIC | Age: 70
End: 2020-07-02
Payer: MEDICARE

## 2020-07-02 ENCOUNTER — HOSPITAL ENCOUNTER (OUTPATIENT)
Dept: RADIOLOGY | Facility: HOSPITAL | Age: 70
Discharge: HOME | End: 2020-07-02
Attending: ORTHOPAEDIC SURGERY
Payer: MEDICARE

## 2020-07-02 VITALS — WEIGHT: 293 LBS | BODY MASS INDEX: 48.82 KG/M2 | HEIGHT: 65 IN

## 2020-07-02 DIAGNOSIS — M79.642 BILATERAL HAND PAIN: ICD-10-CM

## 2020-07-02 DIAGNOSIS — M79.641 BILATERAL HAND PAIN: Primary | ICD-10-CM

## 2020-07-02 DIAGNOSIS — M79.642 BILATERAL HAND PAIN: Primary | ICD-10-CM

## 2020-07-02 DIAGNOSIS — M79.641 BILATERAL HAND PAIN: ICD-10-CM

## 2020-07-02 PROCEDURE — 73130 X-RAY EXAM OF HAND: CPT | Mod: 26,50,, | Performed by: RADIOLOGY

## 2020-07-02 PROCEDURE — 99213 OFFICE O/P EST LOW 20 MIN: CPT | Mod: S$PBB,,, | Performed by: ORTHOPAEDIC SURGERY

## 2020-07-02 PROCEDURE — 73130 X-RAY EXAM OF HAND: CPT | Mod: TC,50

## 2020-07-02 PROCEDURE — 99213 OFFICE O/P EST LOW 20 MIN: CPT | Mod: PBBFAC,25 | Performed by: ORTHOPAEDIC SURGERY

## 2020-07-02 PROCEDURE — 73130 XR HAND COMPLETE 3 VIEWS BILATERAL: ICD-10-PCS | Mod: 26,50,, | Performed by: RADIOLOGY

## 2020-07-02 PROCEDURE — 99213 PR OFFICE/OUTPT VISIT, EST, LEVL III, 20-29 MIN: ICD-10-PCS | Mod: S$PBB,,, | Performed by: ORTHOPAEDIC SURGERY

## 2020-07-02 PROCEDURE — 99999 PR PBB SHADOW E&M-EST. PATIENT-LVL III: CPT | Mod: PBBFAC,,, | Performed by: ORTHOPAEDIC SURGERY

## 2020-07-02 PROCEDURE — 99999 PR PBB SHADOW E&M-EST. PATIENT-LVL III: ICD-10-PCS | Mod: PBBFAC,,, | Performed by: ORTHOPAEDIC SURGERY

## 2020-07-02 NOTE — PROGRESS NOTES
[facility]  History & Physical  Orthopedics    SUBJECTIVE:      COVID-19 attestation:  This patient was treated during the COVID-19 pandemic.  This was discussed with the patient, they are aware of our current policies and procedures, were given the option of delaying their visit and or switching to a virtual visit, delaying their surgery when applicable, and they elect to proceed.    Chief Complaint: Left  Thumb pain    Referring Provider: No ref. provider found     History of Present Illness:  Patient is a 69 y.o. right hand dominant female who presents today with complaints of left thumb arthritis and DeQuervain's tenosynovitis. Pt seen for this issue previously in September of 2019. In the interim she had a fall onto her left hand on 6/3/20.     The patient is retired.    Onset of symptoms/DOI greater than 5 years.    Symptoms are aggravated by activity and movement.    Symptoms are alleviated by rest, immobilization and medication.    Symptoms consist of pain, swelling and deformity.    The patient rates their pain as a 6/10.    Attempted treatment(s) and/or interventions include rest, activity modification, anti-inflammatory medications and splinting/casting.     The patient denies any fevers, chills, N/V, D/C and presents for evaluation.       Past Medical History:   Diagnosis Date    Allergy     Anemia 7/27/2012    Anticoagulant long-term use     Arthritis     Asthma     CHF (congestive heart failure)     CKD (chronic kidney disease) stage 3, GFR 30-59 ml/min 7/27/2012    Clotting disorder     Colon polyp     Deep vein thrombophlebitis of left leg 7/27/2012    Degenerative disc disease     Diabetic peripheral neuropathy associated with type 2 diabetes mellitus 7/27/2012    GERD (gastroesophageal reflux disease)     HTN (hypertension) 7/27/2012    Hyperlipidemia 7/27/2012    Lead-induced gout of ankle or foot     right going on three weeks    Nonproliferative diabetic  retinopathy of right eye 2012    Obstructive sleep apnea 2012    Osteoporosis     Proliferative diabetic retinopathy of left eye 2012    Stroke 2003    Type 2 diabetes mellitus with diabetic polyneuropathy 2012    Ulcer      Past Surgical History:   Procedure Laterality Date    CATARACT EXTRACTION W/  INTRAOCULAR LENS IMPLANT Left 3/2002    left     CATARACT EXTRACTION W/  INTRAOCULAR LENS IMPLANT Right     OD dr. olivares     SECTION      COLONOSCOPY N/A 2018    Procedure: COLONOSCOPY;  Surgeon: Fazian Mac MD;  Location[facility] (2ND FLR);  Service: Endoscopy;  Laterality: N/A;    CYST REMOVAL  2011    left side of face    CYSTOSCOPY W/ RETROGRADES Left 2020    Procedure: CYSTOSCOPY, WITH RETROGRADE PYELOGRAM;  Surgeon: Noman Rivas MD;  [facility];  Service: Urology;  Laterality: Left;  30min    EYE SURGERY Bilateral 2012    eye implants    GANGLION CYST EXCISION  2007    Right wrist    Pan Retinal Photocoagulation Bilateral     Dr. Monterroso (Proliferative Diabetic Retinopathy)    TOTAL ABDOMINAL HYSTERECTOMY  1982    TOTAL KNEE ARTHROPLASTY  2006    left    TRIGGER FINGER RELEASE  2009     Review of patient's allergies indicates:   Allergen Reactions    Iodinated contrast media Rash     Social History     Social History Narrative    , lives with family; 3 children, 2 grandchildren     Family History   Problem Relation Age of Onset    Diabetes Mother     Hypertension Mother     Heart disease Father     Diabetes Sister     Thyroid disease Brother     Diabetes Brother     Hypertension Brother     No Known Problems Daughter     No Known Problems Son     No Known Problems Daughter     Amblyopia Neg Hx     Blindness Neg Hx     Glaucoma Neg Hx     Macular degeneration Neg Hx     Retinal detachment Neg Hx     Strabismus Neg Hx     Colon cancer Neg Hx     Esophageal cancer Neg Hx   "        Current Outpatient Medications:     albuterol (PROAIR HFA) 90 mcg/actuation inhaler, Inhale 2 puffs into the lungs every 6 (six) hours as needed for Wheezing. Rescue, Disp: 18 g, Rfl: 6    allopurinoL (ZYLOPRIM) 300 MG tablet, TAKE 1 TABLET DAILY, Disp: 90 tablet, Rfl: 3    amLODIPine (NORVASC) 10 MG tablet, Take 1 tablet (10 mg total) by mouth once daily., Disp: 90 tablet, Rfl: 3    aspirin 81 MG Chew, Take 1 tablet (81 mg total) by mouth once daily., Disp: , Rfl: 0    bacitracin 500 unit/gram Oint, Apply topically 2 (two) times daily as needed., Disp: 28 g, Rfl: 1    BD ULTRA-FINE KIKA PEN NEEDLE 32 gauge x 5/32" Ndle, To use 4 times daily, Disp: 200 each, Rfl: 20    blood sugar diagnostic Strp, 1 each by Misc.(Non-Drug; Combo Route) route 3 (three) times daily. Dx code  E11.42 . Contour Next, Disp: 120 each, Rfl: 12    blood-glucose meter kit, Use as instructed, true test/result meter, Disp: 1 each, Rfl: 0    blood-glucose meter kit, 1 each by Other route once daily. Use daily. Insurance proffered one touch  E11.42, Disp: 1 each, Rfl: 0    chlorthalidone (HYGROTEN) 25 MG Tab, Take 1 tablet (25 mg total) by mouth once daily., Disp: 30 tablet, Rfl: 11    COLCRYS 0.6 mg tablet, TAKE 1 TABLET EVERY OTHER DAY, Disp: 45 tablet, Rfl: 3    DULoxetine (CYMBALTA) 30 MG capsule, Take 1 capsule (30 mg total) by mouth once daily., Disp: 90 capsule, Rfl: 3    fexofenadine (ALLEGRA) 180 MG tablet, TAKE 1 TABLET BY MOUTH ONCE DAILY (Patient taking differently: Take by mouth. Uses as needed), Disp: 30 tablet, Rfl: 0    fluocinonide (LIDEX) 0.05 % external solution, AAA scalp qday - bid prn pruritus, Disp: 60 mL, Rfl: 3    hydrocortisone (ANUSOL-HC) 2.5 % rectal cream, Place rectally 2 (two) times daily as needed for Hemorrhoids., Disp: 28 g, Rfl: 1    insulin NPH-insulin regular, 70/30, (NOVOLIN 70/30 U-100 INSULIN) 100 unit/mL (70-30) injection, 120 UNITS INTO THE SKIN WITH BREAKFAST, 110 UNITS WITH " LUNCH, AND INJECT 90 UNITS WITH DINNER ; ., Disp: 30 mL, Rfl: 3    irbesartan (AVAPRO) 300 MG tablet, Take 1 tablet (300 mg total) by mouth every evening., Disp: 90 tablet, Rfl: 3    ketoconazole (NIZORAL) 2 % cream, Apply topically once daily., Disp: 1 Tube, Rfl: 2    labetalol (NORMODYNE) 300 MG tablet, Take 1 tablet (300 mg total) by mouth 2 (two) times daily., Disp: 180 tablet, Rfl: 3    lancets 31 gauge Misc, 1 lancet by Misc.(Non-Drug; Combo Route) route 4 (four) times daily. E11.42 . Prescribed one touch, Disp: 200 each, Rfl: 6    lancets 33 gauge Misc, 1 lancet by Misc.(Non-Drug; Combo Route) route 4 (four) times daily. Dx code E11.42, Disp: 200 each, Rfl: 12    multivitamin (THERAGRAN) per tablet, Take 1 tablet by mouth once daily., Disp: , Rfl:     nystatin (MYCOSTATIN) powder, Apply topically 3 (three) times daily as needed., Disp: 60 g, Rfl: 2    pregabalin (LYRICA) 150 MG capsule, Take 1 capsule (150 mg total) by mouth 2 (two) times daily., Disp: 180 capsule, Rfl: 4    semaglutide (OZEMPIC) 0.25 mg or 0.5 mg(2 mg/1.5 mL) PnIj, Inject 0.5 mg into the skin every 7 days., Disp: 4 Syringe, Rfl: 3    simvastatin (ZOCOR) 40 MG tablet, Take 1 tablet (40 mg total) by mouth every evening., Disp: 90 tablet, Rfl: 4    sodium bicarbonate 325 MG tablet, Take 2 tablets (650 mg total) by mouth 2 (two) times daily., Disp: 120 tablet, Rfl: 11    famotidine (PEPCID) 20 MG tablet, Take 1 tablet (20 mg total) by mouth 2 (two) times daily., Disp: 1 tablet, Rfl: 0    glucagon, human recombinant, (GLUCAGON EMERGENCY KIT, HUMAN,) 1 mg SolR, Inject 1 mg into the muscle as needed., Disp: 1 each, Rfl: 3    traMADol (ULTRAM) 50 mg tablet, Take 1 tablet (50 mg total) by mouth every 8 (eight) hours as needed., Disp: 90 tablet, Rfl: 3      Review of Systems:  Constitutional: no fever or chills  Eyes: no visual changes  ENT: no nasal congestion or sore throat  Respiratory: no cough or shortness of  "breath  Cardiovascular: no chest pain  Gastrointestinal: no nausea or vomiting, tolerating diet  Musculoskeletal: arthralgias and pain    OBJECTIVE:      Vital Signs (Most Recent):  Vitals:    07/02/20 1431   Weight: (!) 142 kg (313 lb 0.9 oz)   Height: 5' 5" (1.651 m)     Body mass index is 52.09 kg/m².      Physical Exam:  Constitutional: The patient appears well-developed and well-nourished. No distress.   Head: Normocephalic and atraumatic.   Nose: Nose normal.   Eyes: Conjunctivae and EOM are normal.   Neck: No tracheal deviation present.   Cardiovascular: Normal rate and intact distal pulses.    Pulmonary/Chest: Effort normal. No respiratory distress.   Abdominal: There is no guarding.   Neurological: The patient is alert.   Psychiatric: The patient has a normal mood and affect.     Left Hand/Wrist Examination:    Observation/Inspection:  Swelling  none    Deformity  none  Discoloration  none     Scars   none    Atrophy  none    HAND/WRIST EXAMINATION:  Finkelstein's Test   Pos  WHAT Test    Pos  Snuff box tenderness   Neg  Juarez's Test    Neg  Hook of Hamate Tenderness  Neg  CMC grind    Pos  Circumduction test   Pos    Neurovascular Exam:  Digits WWP, brisk CR < 3s throughout  NVI motor/LTS to M/R/U nerves, radial pulse 2+  Tinel's Test - Carpal Tunnel  Neg  Tinel's Test - Cubital Tunnel  Neg  Phalen's Test    Neg  Median Nerve Compression Test Neg    ROM hand/wrist/elbow full, painless    RRR  CTAB  Abd S/NT/ND +BS    Diagnostic Results:     Xray - New bilateral hand x ray today. No fractures or dislocations appreciated. Bilateral 1st CMC arthritis  EMG - None    ASSESSMENT/PLAN:      Annika Mac is a 69 y.o. female with L>R 1 CMC arthritis and L deQuervain tenosynovitis   Plan:   1) Ricks and benefits of operative intervention reviewed with patient to day. She would like to think about it.  2) Thumb spica splint supplied in clinic today.   3) RTC PRN        Marky Hagen M.D.     Please be aware that " this note has been generated with the assistance of [employer] voice-to-text.  Please excuse any spelling or grammatical errors.

## 2020-07-07 ENCOUNTER — OFFICE VISIT (OUTPATIENT)
Dept: ENDOCRINOLOGY | Facility: CLINIC | Age: 70
End: 2020-07-07
Payer: MEDICARE

## 2020-07-07 ENCOUNTER — CLINICAL SUPPORT (OUTPATIENT)
Dept: DIABETES | Facility: CLINIC | Age: 70
End: 2020-07-07
Payer: MEDICARE

## 2020-07-07 ENCOUNTER — CLINICAL SUPPORT (OUTPATIENT)
Dept: REHABILITATION | Facility: HOSPITAL | Age: 70
End: 2020-07-07
Payer: MEDICARE

## 2020-07-07 VITALS
HEART RATE: 66 BPM | HEIGHT: 65 IN | WEIGHT: 293 LBS | BODY MASS INDEX: 48.82 KG/M2 | SYSTOLIC BLOOD PRESSURE: 110 MMHG | DIASTOLIC BLOOD PRESSURE: 60 MMHG

## 2020-07-07 DIAGNOSIS — M62.81 MUSCLE WEAKNESS OF LEFT ARM: ICD-10-CM

## 2020-07-07 DIAGNOSIS — N18.4 TYPE 2 DM WITH CKD STAGE 4 AND HYPERTENSION: ICD-10-CM

## 2020-07-07 DIAGNOSIS — Z79.4 TYPE 2 DIABETES MELLITUS WITH DIABETIC POLYNEUROPATHY, WITH LONG-TERM CURRENT USE OF INSULIN: Primary | ICD-10-CM

## 2020-07-07 DIAGNOSIS — E11.42 TYPE 2 DIABETES MELLITUS WITH DIABETIC POLYNEUROPATHY, WITH LONG-TERM CURRENT USE OF INSULIN: Primary | ICD-10-CM

## 2020-07-07 DIAGNOSIS — E11.22 TYPE 2 DM WITH CKD STAGE 4 AND HYPERTENSION: ICD-10-CM

## 2020-07-07 DIAGNOSIS — I12.9 TYPE 2 DM WITH CKD STAGE 4 AND HYPERTENSION: ICD-10-CM

## 2020-07-07 DIAGNOSIS — M25.612 DECREASED ROM OF LEFT SHOULDER: Primary | ICD-10-CM

## 2020-07-07 DIAGNOSIS — E11.9 TYPE 2 DIABETES MELLITUS WITHOUT COMPLICATION, WITH LONG-TERM CURRENT USE OF INSULIN: ICD-10-CM

## 2020-07-07 DIAGNOSIS — Z79.4 TYPE 2 DIABETES MELLITUS WITHOUT COMPLICATION, WITH LONG-TERM CURRENT USE OF INSULIN: ICD-10-CM

## 2020-07-07 DIAGNOSIS — E11.42 TYPE 2 DIABETES MELLITUS WITH DIABETIC POLYNEUROPATHY: ICD-10-CM

## 2020-07-07 DIAGNOSIS — E66.01 MORBID OBESITY WITH BMI OF 45.0-49.9, ADULT: ICD-10-CM

## 2020-07-07 PROCEDURE — 99999 PR PBB SHADOW E&M-EST. PATIENT-LVL I: CPT | Mod: PBBFAC,,,

## 2020-07-07 PROCEDURE — 99214 PR OFFICE/OUTPT VISIT, EST, LEVL IV, 30-39 MIN: ICD-10-PCS | Mod: S$PBB,,, | Performed by: NURSE PRACTITIONER

## 2020-07-07 PROCEDURE — 99999 PR PBB SHADOW E&M-EST. PATIENT-LVL I: ICD-10-PCS | Mod: PBBFAC,,,

## 2020-07-07 PROCEDURE — 99999 PR PBB SHADOW E&M-EST. PATIENT-LVL III: ICD-10-PCS | Mod: PBBFAC,,, | Performed by: NURSE PRACTITIONER

## 2020-07-07 PROCEDURE — 99211 OFF/OP EST MAY X REQ PHY/QHP: CPT | Mod: PBBFAC,27

## 2020-07-07 PROCEDURE — 99214 OFFICE O/P EST MOD 30 MIN: CPT | Mod: S$PBB,,, | Performed by: NURSE PRACTITIONER

## 2020-07-07 PROCEDURE — G0108 DIAB MANAGE TRN  PER INDIV: HCPCS | Mod: PBBFAC

## 2020-07-07 PROCEDURE — 97110 THERAPEUTIC EXERCISES: CPT | Mod: PO,CQ

## 2020-07-07 PROCEDURE — 99999 PR PBB SHADOW E&M-EST. PATIENT-LVL III: CPT | Mod: PBBFAC,,, | Performed by: NURSE PRACTITIONER

## 2020-07-07 PROCEDURE — 99213 OFFICE O/P EST LOW 20 MIN: CPT | Mod: PBBFAC | Performed by: NURSE PRACTITIONER

## 2020-07-07 RX ORDER — SEMAGLUTIDE 1.34 MG/ML
0.5 INJECTION, SOLUTION SUBCUTANEOUS
Qty: 12 SYRINGE | Refills: 4 | Status: SHIPPED | OUTPATIENT
Start: 2020-07-07 | End: 2020-10-07 | Stop reason: DRUGHIGH

## 2020-07-07 NOTE — PROGRESS NOTES
Diabetes Education  Author: Maria G Lopez RN, CDE  Date: 7/7/2020    Diabetes Care Management Summary  Diabetes Education Record Assessment/Progress: Post Program/Follow-up  Current Diabetes Risk Level: Low     Last A1c:         Lab Results   Component Value Date     HGBA1C 6.7 7/2/2020     Diabetes Type  Diabetes Type : Type II    Diabetes History  Diabetes Diagnosis: >10 years  Current Treatment: Insulin(70/30 Breakfast 110 units; Lunch 90 units; Dinner 65 units)  Reviewed Problem List with Patient: Yes    Health Maintenance was reviewed today with patient. Discussed with patient importance of routine eye exams, foot exams/foot care, blood work (i.e.: A1c, microalbumin, and lipid), dental visits, yearly flu vaccine, and pneumonia vaccine as indicated by PCP. Patient verbalized understanding.     Health Maintenance Topics with due status: Not Due       Topic Last Completion Date    Colorectal Cancer Screening 02/26/2018    TETANUS VACCINE 08/24/2018    Influenza Vaccine 10/22/2019    Mammogram 10/30/2019    Lipid Panel 11/13/2019    Eye Exam 12/20/2019    Hemoglobin A1c 07/02/2020    High Dose Statin 07/07/2020     Health Maintenance Due   Topic Date Due    Shingles Vaccine (1 of 2) 12/08/1999    Foot Exam  06/13/2020    DEXA SCAN  06/09/2020       Nutrition  Meal Planning: drinks regular soda, 3 meals per day  What type of beverages do you drink?: diet soda/tea, water, juice  Meal Plan 24 Hour Recall - Lunch: jambalaya and white beans    Monitoring   Self Monitoring : per Dexcom Average glucose 145; Time in Range 80%  Blood Glucose Logs: Yes  Do you use a personal continuous glucose monitor?: Yes  What kind of glucose monitor do you use?: Dexcom  In the last month, how often have you had a low blood sugar reaction?: more than once a week  What are your symptoms of low blood sugar?: Feels tired  How do you treat low blood sugar?: drinks juice or soda    Exercise   Exercise Type: none    Current Diabetes  Treatment   Current Treatment: Insulin(70/30 Breakfast 110 units; Lunch 90 units; Dinner 65 units)    Social History  Preferred Learning Method: Face to Face  Primary Support: Self, Spouse  Occupation: Retired       Barriers to Change  Barriers to Change: None  Learning Challenges : None    Readiness to Learn   Readiness to Learn : Acceptance    Cultural Influences  Cultural Influences: None    Diabetes Education Assessment/Progress  The patient is in clinic today for general diabetes education and nutritional counseling.    Nutrition (Incorporating nutritional management into one's lifestyle): Discussion, Needs Review, Written Materials Provided  Reviewed patients eating habits.  See above.  Patient admits to not portioning her carbs.  One meal recall indicated high carb content.  Pt also admits to drinking one regular soda now and then.  We reviewed portion control.  Patient  has a divided plate.  Encouraged to move towards more non-starchy vegetables in her diet. States has put on weight and wants to begin losing.  · Discussed carb sources  · Plate method for carb counting  · Avoidance of sugary drinks and foods  · Appropriate amounts of carbs per meal  · Advised patient to add a source of protein with each meal    Medications (states correct name, dose, onset, peak, duration, side effects & timing of meds): Discussion, Comprehends Key Points, Written Materials Provided   Reviewed patient's current medication/insulin regimen    Will titrate her doses based on the following: if bg  she will decrease her dose by 50%.  If above 120 keeps the prescribed dose.    Monitoring (monitoring blood glucose/other parameters & using results): Discussion, Demonstrates Understanding/Competency (verbalizes/demonstrates), Written Materials Provided   Dexcom was downloaded and reviewed.  She is having some lows noted early am and evening.  Discussed with NP. See  for report   She has made significant  improvement in her overall glucose management with 2% reduction in her A1c.    Acute Complications (preventing, detecting, and treating acute complications): Discussion, Comprehends Key Points, Written Materials Provided  · Reviewed s/s hypoglycemia and treatment of same  · Advised to carry a source of fast acting carbs     Goals  Patient has selected/evaluated goals during today's session: Yes, selected  Healthy Eating: (Goal to keep carb portions at 2-3 per meal; goal to keep bgs WNL)       Diabetes Meal Plan  Restrictions: Restricted Carbohydrate  Carbohydrate Per Meal: 30-45g  Carbohydrate Per Snack : 7-15g    Today's Self-Management Care Plan was developed with the patient's input and is based on barriers identified during today's assessment.    The long and short-term goals in the care plan were written with the patient/caregiver's input. The patient has agreed to work toward these goals to improve her overall diabetes control.      The patient received a copy of today's self-management plan and verbalized understanding of the care plan, goals, and all of today's instructions.      The patient was encouraged to communicate with her physician and care team regarding her condition(s) and treatment.  I provided the patient with my contact information today and encouraged her to contact me via phone or patient portal as needed.     Education Units of Time   Time Spent: 30 min

## 2020-07-07 NOTE — PROGRESS NOTES
"  Physical Therapy Treatment Note     Name: Annika Mac  Clinic Number: [id number]    Therapy Diagnosis: No diagnosis found.  Physician: IGLESIA Garcia MD    Visit Date: 7/7/2020    Physician Orders: PT Eval and Treat   Medical Diagnosis from Referral: M25.512,G89.29 (ICD-10-CM) - Chronic left shoulder pain   Evaluation Date: 6/30/2020  Authorization Period Expiration: 06/17/2021   Plan of Care Expiration: 8/30/2020  Visit # / Visits authorized: 2/ 20    Time In: ***  Time Out: ***  Total Billable Time: *** minutes    Precautions: {IP WOUND PRECAUTIONS OHS:05375}    Subjective     Pt reports: ***.  She {Actions; was/was not:36340} compliant with home exercise program.  Response to previous treatment: ***  Functional change: ***    Pain: {0-10:84867::"0"}/10  Location: {RIGHT/LEFT/BILATERAL:00000} {LOCATION ON BODY:82674}     Objective     Annika received therapeutic exercises to develop {AMB PT PROGRESS OBJECTIVE:38084} for *** minutes including:  ***  Date  7/7/2020 6/30/2020   VISIT 2/20 1/1          POC EXP. DATE 8/30/2020 8/30/2020   VISIT AMOUNT  MEDICARE TOTAL  113.20   FACE-TO-FACE 7/30/2020 7/30/2020   FOTO 2/5 1/5          Pulley into flexion  next   TABLE:      Table slide: flexion  1 x 10   Table slide: abduction  1 x 10   Table slide: ER  next   Scapular retractions  10 x 3"   Posterior shoulder roll  next   Wand flexion  next   Shoulder isometrics:  Flexion  Extension  IR  ER     1 x 10  1 x 10  1 x 10  1 x 10                        STANDING:      Codman's  1'                                             Initials  MA       Annika received the following manual therapy techniques: {AMB PT PROGRESS MANUAL THERAPY:70200} were applied to the: *** for *** minutes, including:  ***    Annika participated in neuromuscular re-education activities to improve: {AMB PT PROGRESS NEURO RE-ED:79917} for *** minutes. The following activities were included:  ***    Annika participated in dynamic functional therapeutic activities " "to improve functional performance for ***  minutes, including:  ***    Annika participated in gait training to improve functional mobility and safety for ***  minutes, including:  ***    Annika received the following direct contact modalities after being cleared for contraindications: {AMB PT PROGRESS DIRECT CONTACT MODES:92963}    Annika received the following supervised modalities after being cleared for contradictions: {AMB PT SUPERVISED MODES:51247}    Annika received hot pack for *** minutes to ***.    Annika received cold pack for *** minutes to ***.      Home Exercises Provided and Patient Education Provided     Education provided:   - ***    Written Home Exercises Provided: {Blank single:92493::"yes","Patient instructed to cont prior HEP"}.  Exercises were reviewed and Annika was able to demonstrate them prior to the end of the session.  Annika demonstrated {Desc; good/fair/poor:85744} understanding of the education provided.     See EMR under {Blank single:56858::"Media","Patient Instructions"} for exercises provided {Blank single:76013::"7/7/2020","prior visit"}.    Assessment     ***  Annika {IS/IS NOT:71491} progressing well towards her goals.   Pt prognosis is Fair.     Pt will continue to benefit from skilled outpatient physical therapy to address the deficits listed in the problem list box on initial evaluation, provide pt/family education and to maximize pt's level of independence in the home and community environment.     Pt's spiritual, cultural and educational needs considered and pt agreeable to plan of care and goals.     Anticipated barriers to physical therapy: transportation, pain tolerance, apprehension    Goals:   Short Term Goals:  4 weeks  1. Patient will be compliant with HEP to promote the independent management of current diagnosis. In progress, not met  2. Patient will increase left shoulder flexion to 90 degrees for function of dressing. In progress, not met  3. Patient will increase left shoulder " functional ER to reach C2 for function of grooming. In progress, not met  4. Patient will report a decrease in complaints of left shoulder pain to 6/10 during performance of ADL's for independence of self care activities. In progress, not met        Long Term Goals:  8 weeks  1. Patient will increase left shoulder strength to 4-/5 to improve tolerance to light house work. In progress, not met  2. Patient will increase left shoulder flexion to 135 degrees in order to place dishes in overhead cabinets independently for self care independence. In progress, not met  3. Patient will increase left shoulder functional IR to reach T7 for function of dressing. In progress, not met  4. Patient will increase scapular stabilization strength to 4/5 to improve tolerance to normal lifting. In progress, not met    Plan     ***    Talia Swanson, PT

## 2020-07-07 NOTE — PROGRESS NOTES
"  Physical Therapy Daily Treatment Note     Name: Annika Mac  Clinic Number: [id number]    Therapy Diagnosis:   Encounter Diagnoses   Name Primary?    Decreased ROM of left shoulder Yes    Muscle weakness of left arm      Physician: IGLESIA Garcia MD    Visit Date: 7/7/2020    Physician Orders: PT Eval and Treat   Medical Diagnosis from Referral: M25.512,G89.29 (ICD-10-CM) - Chronic left shoulder pain   Evaluation Date: 6/30/2020  Authorization Period Expiration: 06/17/2021   Plan of Care Expiration: 8/30/2020  Visit # / Visits authorized: 2/20     Time In: 1:45 pm  Time Out: 2:25 pm  Total Appointment Time (timed & untimed codes): 40 minutes     Precautions: Standard and Fall      Subjective     Pt reports: feeling increased sharp pain today.  She was compliant with home exercise program.  Response to previous treatment: increased soreness  Functional change: none at the moment    Pain: 7/10  Location: left shoulder      Objective     Annika received therapeutic exercises to develop strength, ROM, flexibility and posture for 40 minutes 1:1 with PTA including:     Date  7/7/2020 6/30/2020   VISIT 2/20 1/1          POC EXP. DATE 8/30/2020 8/30/2020   VISIT AMOUNT  MEDICARE TOTAL 90.96  204.16 113.20   FACE-TO-FACE 7/30/2020 7/30/2020   FOTO 2/5 1/5          Pulley Flexion  2' next   TABLE:      Table slide: flexion 1 x 15 1 x 10   Table slide: abduction 1 x 15 1 x 10   Table slide: ER 1 x 15 next   Scapular retractions 15 x 3" 10 x 3"   Posterior shoulder roll 1 x 15 next   Wand flexion 1 x 15 next   Wand ABD 1 x 15    Shoulder isometrics:  Flexion  Extension  IR  ER   10 x 3"  10 x 3"  10 x 3"  10 x 3"    1 x 10  1 x 10  1 x 10  1 x 10                       STANDING:      Codman's 10 x ea Cw/Ccw 1'   Pec str Next?     IR stretch with strap Next?                                 Initials SB MA         Home Exercises and Patient Education Provided     Education provided:   - HEP     Written Home Exercises Provided: " yes.  Exercises were reviewed and Annika was able to demonstrate them prior to the end of the session.  Annika demonstrated fair  understanding of the education provided.      See EMR under Patient Instructions for exercises provided 6/30/2020.      Assessment     Pt demo'd very poor tolerance to standing due to severe weakness in LE's, which limited many exercises. Pt required consistent cueing to remain in pain-free range with exercises. Pt will be highly limited on exercise tolerance and endurance going forward.     Annika is progressing well towards her goals.   Pt prognosis is Good.     Pt will continue to benefit from skilled outpatient physical therapy to address the deficits listed in the problem list box on initial evaluation, provide pt/family education and to maximize pt's level of independence in the home and community environment.     Pt's spiritual, cultural and educational needs considered and pt agreeable to plan of care and goals.    Anticipated Barriers for therapy: transportation, pain tolerance, apprehension       Goals:  Short Term Goals:  4 weeks  1. Patient will be compliant with HEP to promote the independent management of current diagnosis.  2. Patient will increase left shoulder flexion to 90 degrees for function of dressing.  3. Patient will increase left shoulder functional ER to reach C2 for function of grooming.  4. Patient will report a decrease in complaints of left shoulder pain to 6/10 during performance of ADL's for independence of self care activities.         Long Term Goals:  8 weeks  1. Patient will increase left shoulder strength to 4-/5 to improve tolerance to light house work.  2. Patient will increase left shoulder flexion to 135 degrees in order to place dishes in overhead cabinets independently for self care independence.   3. Patient will increase left shoulder functional IR to reach T7 for function of dressing.  4. Patient will increase scapular stabilization strength to 4/5  to improve tolerance to normal lifting.      Plan     Continue with planned PT POC as tolerated. Progress all exercises next visit.     Irena Barron, PTA 1/6

## 2020-07-09 ENCOUNTER — CLINICAL SUPPORT (OUTPATIENT)
Dept: REHABILITATION | Facility: HOSPITAL | Age: 70
End: 2020-07-09
Payer: MEDICARE

## 2020-07-09 DIAGNOSIS — M62.81 MUSCLE WEAKNESS OF LEFT ARM: ICD-10-CM

## 2020-07-09 DIAGNOSIS — M25.642 DECREASED RANGE OF MOTION OF LEFT THUMB: ICD-10-CM

## 2020-07-09 DIAGNOSIS — M79.641 BILATERAL HAND PAIN: ICD-10-CM

## 2020-07-09 DIAGNOSIS — M79.642 BILATERAL HAND PAIN: ICD-10-CM

## 2020-07-09 DIAGNOSIS — M79.642 PAIN IN BOTH HANDS: ICD-10-CM

## 2020-07-09 DIAGNOSIS — M79.641 PAIN IN BOTH HANDS: ICD-10-CM

## 2020-07-09 DIAGNOSIS — M25.612 DECREASED ROM OF LEFT SHOULDER: ICD-10-CM

## 2020-07-09 PROCEDURE — 97165 OT EVAL LOW COMPLEX 30 MIN: CPT | Mod: PO

## 2020-07-09 PROCEDURE — 97018 PARAFFIN BATH THERAPY: CPT | Mod: PO,59

## 2020-07-09 PROCEDURE — 97110 THERAPEUTIC EXERCISES: CPT | Mod: PO

## 2020-07-09 NOTE — PATIENT INSTRUCTIONS
[facility], OCCUPATIONAL THERAPY  HOME EXERCISE PROGRAM     Complete the following massages for 3-5min each, 2-3x/day.                         Complete the following exercises with 5-10 repetitions each, 2x/day.       AROM: Wrist Flexion / Extension               Bend your wrist forward and back as far as possible.      AROM: Wrist Radial / Ulnar Deviation  Bend your wrist from side to side as far as possible.    AROM: Wrist Flexion / Extension  Make a fist, then bend your wrist forward then back as far as possible.         AROM: Wrist Radial / Ulnar Deviation   Make a fist then bend your wrist toward your body, then away.         MP Flexion (Assistive)        With hand resting on little finger side, slide thumb across palm.     IP Flexion (Active Blocked)        Brace thumb below tip joint. Bend joint as far as possible.    Radial Adduction/Abduction (Active)        Move thumb out to side. Move back alongside index finger.    Palmar Adduction/Abduction (Active)        Move thumb down, away from palm. Move back to rest along palm.    MP Extension (Active)        With palm on table, lift thumb up. Hold ____ seconds. Relax and lower thumb.    Pratik Veloz, OT

## 2020-07-09 NOTE — PLAN OF CARE
[facility] Occupational Therapy  Initial Evaluation     Date: 7/9/2020  Name: Annika Mac  Clinic Number: [id number]    Therapy Diagnosis:   Encounter Diagnoses   Name Primary?    Bilateral hand pain     Pain in both hands     Decreased range of motion of left thumb      Physician: Marky Hagen MD    Physician Orders: Eval & treat  Medical Diagnosis: B Hand Pain; CMC arthritis & De Quervain's  Surgical Procedure and Date: none  Evaluation Date: 07/09/20  Insurance Authorization Period Expiration: 12/31/20  Plan of Care Certification Period: 07/09/20 to 08/19/20  Date of Return to MD: TBD    Visit # / Visits authorized: 1 / 20  Time In: 8:50 am  Time Out: 9:30 am  Total Billable Time: 40 minutes (Eval, P, 1TE)    Precautions:  Standard, Diabetes and blood thinners    Subjective     Involved Side: Both hands  Dominant Side: Right  Date of Onset: 06/03/20  Mechanism of Injury: patient fell  History of Current Condition: Patient initially self-treated her L hand injury after the fall, until pain did not resolve. She went to see Dr. Hagen who is recommending surgical intervention for her L hand/thumb, however, patient preferred conservative treatment at this time.   Surgical Procedure: None  Imaging: X-rays  Previous Therapy: 06/19/2019 to 08/14/2019    Past Medical History/Physical Systems Review:   Annika Mac  has a past medical history of Allergy, Anemia, Anticoagulant long-term use, Arthritis, Asthma, CHF (congestive heart failure), CKD (chronic kidney disease) stage 3, GFR 30-59 ml/min, Clotting disorder, Colon polyp, Deep vein thrombophlebitis of left leg, Degenerative disc disease, Diabetic peripheral neuropathy associated with type 2 diabetes mellitus, GERD (gastroesophageal reflux disease), HTN (hypertension), Hyperlipidemia, Lead-induced gout of ankle or foot, Nonproliferative diabetic retinopathy of right eye, Obstructive sleep apnea, Osteoporosis, Proliferative diabetic retinopathy  of left eye, Stroke, Type 2 diabetes mellitus with diabetic polyneuropathy, and Ulcer.    Annika Mac  has a past surgical history that includes Total knee arthroplasty (2006); Total abdominal hysterectomy (); Ganglion cyst excision (2007); Cyst Removal (2011); Trigger finger release (2009);  section; Cataract extraction w/  intraocular lens implant (Left, 3/2002); Cataract extraction w/  intraocular lens implant (Right, ); Pan Retinal Photocoagulation (Bilateral); Eye surgery (Bilateral, ); Colonoscopy (N/A, 2018); and Cystoscopy w/ retrogrades (Left, 2020).    Annika has a current medication list which includes the following prescription(s): albuterol, allopurinol, amlodipine, aspirin, bacitracin, bd ultra-fine myesha pen needle, blood sugar diagnostic, blood-glucose meter, blood-glucose meter, chlorthalidone, colcrys, duloxetine, famotidine, fexofenadine, fluocinonide, glucagon (human recombinant), hydrocortisone, insulin nph-insulin regular (70/30), irbesartan, ketoconazole, labetalol, lancets, lancets, multivitamin, nystatin, pregabalin, ozempic, simvastatin, sodium bicarbonate, tramadol, and insulin glargine (toujeo).    Review of patient's allergies indicates:   Allergen Reactions    Iodinated contrast media Rash        Patient's Goals for Therapy: get rif of the pain and be able to use my hands    Pain:  Functional Pain Scale Rating 0-10:   8/10 on average  6/10 at best  9/10 at worst  Location: B wrist/thumb   Description: Aching, Throbbing, Tingling and Sharp  Aggravating Factors: Lifting and gripping, pinching, or any use with both hands/thumbs  Easing Factors: massage, ice, heating pad, rest and splinting    Occupation:  ; currently a home-maker  Working presently: Retired  Duties: cooking, cleaning, shopping, laundry    Functional Limitations/Social History:    Previous functional status includes: Independent with all ADLs.     Current  FunctionalStatus   Home/Living environment : lives with their spouse      Limitation of Functional Status as follows:   ADLs/IADLs:     - Feeding: pain with cutting foods/meats    - Bathing: minimal difficulty/pain    - Dressing/Grooming: minimal difficulty & pain    - Driving: pain with gripping steering wheel       Objective     Observation/Appearance:  Skin intact; Minimal edema present in L wrist/thumb; Moderate L thumb degenerative joint changes noted.      Edema. Measured in centimeters.   7/9/2020 7/9/2020    Left Right   Wrist Crease 18.5 19.0   Mid palm 20.5 20.8   MCPs 19.2 19.5     Edema. Measured in centimeters.   7/9/2020 7/9/2020    Left Right   Thumb:     Prox. Phalanx 6.8 7.0   IP 7.2 7.3     Hand ROM: full composite fist noted B hands     Left Right   Thumb: MP 0/36 0/47                IP +60/34 +30/45       Rad ADD/ABD 38 40       Pal ADD/ABD 41 45          Opposition To SF MCP To SF DPC     Sensation: Patient reports some tingling in her L hand.  Light touch and temperature is intact B hands     Strength (Dyanmometer) and Pinch Strength (Pinch Gauge)  Measured in pounds and psi. Average of three trials.   7/9/2020 7/9/2020    Left Right   Rung II 8 * 60   Key Pinch 2 17   3pt Pinch 3 13   2pt Pinch 3 7   * Pain with testing        CMS Impairment/Limitation/Restriction for FOTO Hand Survey    Therapist reviewed FOTO scores for Annika Mac on 7/9/2020.   FOTO documents entered into [employer] - see Media section.    Limitation Score: 51%  Category: Self Care         Treatment     Treatment Time In: 9:10 am  Treatment Time Out: 9:30 am  Total Treatment time separate from Evaluation time: 20 minutes    Annika received the following supervised modalities after being cleared for contradictions for 10 minutes:   -Paraffin to B hands, pre-tx to decrease pain & increase tissue extensibility    Annika received therapeutic exercises for 10 minutes including:  -performed initial HEP, see attached in Patient  Instructions for details      Home Exercise Program/Education:  Issued HEP (see patient instructions in EMR) and educated on modality use for pain management . Exercises were reviewed and Annika was able to demonstrate them prior to the end of the session.   Patient received a written copy of exercises to perform at home. Annika demonstrated good  understanding of the education provided.  Patient was advised to perform these exercises free of pain, and to stop performing them if pain occurs.    Patient/Family Education: role of OT, goals for OT, scheduling/cancellations - patient verbalized understanding. Discussed insurance limitations with patient.    Additional Education provided: Basic Joint Protection Principles in ADL/IADL's to reduce joint strain/stress in B hands    Assessment     Annika Mac is a 69 y.o. female referred to outpatient occupational therapy and presents with a medical diagnosis of B Hand Pain, resulting in Decreased ROM, Decreased  strength, Decreased pinch strength, Decreased functional hand use, Increased pain, Edema, Joint Stiffness, Diminished/Impaired Sensation and Diminished/Impaired Coordination and demonstrates limitations as described in the chart below. Following medical record review it is determined that patient will benefit from occupational therapy services in order to maximize pain free and/or functional use of bilateral hands. The following goals were discussed with the patient and patient is in agreement with them as to be addressed in the treatment plan. The patient's rehab potential is Fair to Good.     Anticipated barriers to occupational therapy: DM, arthritis, obesity  Patient has no cultural, educational or language barriers to learning provided.    Profile and History Assessment of Occupational Performance Level of Clinical Decision Making Complexity Score   Occupational Profile:   Annika Mac is a 69 y.o. female who lives with their spouse and is currently  home-maker. Annika Mac has difficulty with  feeding, grooming and dressing  driving/transportation management and housework/household chores  affecting his/her daily functional abilities. His/her main goal for therapy is decrease pain and increase B hand functioning in ADL/IADL's.     Comorbidities:   diabetes and arthritis, obesity    Medical and Therapy History Review:   Brief               Performance Deficits    Physical:  Joint Mobility  Muscle Endurance  Edema   Strength  Pinch Strength  Fine Motor Coordination  Pain    Cognitive:  No Deficits    Psychosocial:    No Deficits     Clinical Decision Making:  low    Assessment Process:  Problem-Focused Assessments    Modification/Need for Assistance:  Not Necessary    Intervention Selection:  Several Treatment Options       low  Based on PMHX, co morbidities , data from assessments and functional level of assistance required with task and clinical presentation directly impacting function.       The following goals were discussed with the patient and patient is in agreement with them as to be addressed in the treatment plan.     Goals:   Short Term Goals: (in 2 weeks)  1) Patient will be independent in HEP  2) Decrease pain in B hands/thumbs to no more than 6-7/10 worst in ADL/IADL's  3) Increase AROM in L thumb Rad/Palm ABD by 3-5 degrees for improved functioning in ADL/IADL's  4) Increase B  strength by 3-5 psi for increased functional use  5) Decrease edema in L wrist/thumb to trace     Long Term Goals: (in 6 weeks)  1) Decrease pain in B hands/thumbs to no more than 2-3/10 worst in ADL/IADL's  2) Increase AROM of B hands/thumbs to WFL for increased functioning in ADL/IADL's  3) Increase strength in B  by 20% of initial measures for improved functioning in ADL/IADL's  4) Increased functioning in ADL/IADL's, as evidenced by a FOTO impairment rating of no more than 43%  5) Decrease edema in B wrist/hand to trace or none       Plan     Certification  Period/Plan of care expiration: 7/9/2020 to 08/03/20.    Outpatient Occupational Therapy 2 times weekly for 6 weeks to include the following interventions: Paraffin, Fluidotherapy, Manual therapy/joint mobilizations, Modalities for pain management, US 3 mhz, Therapeutic exercises/activities., Strengthening, Orthotic Fabrication/Fit/Training, Edema Control, Electrical Modalities and Joint Protection.      Pratik Veloz, OT

## 2020-07-09 NOTE — PROGRESS NOTES
"  Physical Therapy Daily Treatment Note     Name: Annika Mac  Clinic Number: [id number]    Therapy Diagnosis:   Encounter Diagnoses   Name Primary?    Decreased ROM of left shoulder     Muscle weakness of left arm      Physician: IGLESIA Garcia MD    Visit Date: 7/9/2020    Physician Orders: PT Eval and Treat   Medical Diagnosis from Referral: M25.512,G89.29 (ICD-10-CM) - Chronic left shoulder pain   Evaluation Date: 6/30/2020  Authorization Period Expiration: 06/17/2021   Plan of Care Expiration: 8/30/2020  Visit # / Visits authorized: 3/20     Time In: 9:30 am  Time Out: 10:07 am  Total Appointment Time (timed & untimed codes): 35 minutes     Precautions: Standard and Fall      Subjective     Pt reports: having some pain in her shoulder today, she does get some increased back pain when doing the table exercises at home  She was compliant with home exercise program.  Response to previous treatment: increased soreness  Functional change: lifting her shoulder higher than before    Pain: 7/10  Location: left shoulder      Objective     Annika received therapeutic exercises to develop strength, ROM, flexibility and posture for 35 minutes 1:1 with PT including:     Date  7/9/2020 7/7/2020 6/30/2020   VISIT 3/20 2/20 1/1           POC EXP. DATE 8/30/2020 8/30/2020 8/30/2020   VISIT AMOUNT  MEDICARE TOTAL 60.64  264.80 90.96  204.16 113.20   FACE-TO-FACE 7/30/2020 7/30/2020 7/30/2020   FOTO 3/5 2/5 1/5           Pulley Flexion  2' 2' next   TABLE:       Table slide: flexion Not today 1 x 15 1 x 10   Table slide: abduction Not today 1 x 15 1 x 10   Table slide: ER Not today 1 x 15 next   Scapular retractions 15 x 3" 15 x 3" 10 x 3"   Posterior shoulder roll 1 x 15 1 x 15 next   Wand flexion 1 x 15 1 x 15 next   Wand ABD 1 x 15 1 x 15    Shoulder isometrics:  Flexion  Extension  IR  ER   10 x 3"  10 x 3"  10 x 3"  10 x 3"   10 x 3"  10 x 3"  10 x 3"  10 x 3"    1 x 10  1 x 10  1 x 10  1 x 10                        "   STANDING:       Codman's 10 x ea  cw/ccw 10 x ea Cw/Ccw 1'   Pec str Next? Next?     IR stretch with strap 1 x 10 Next?                                     Initials DG MARTHA ROJAS         Home Exercises and Patient Education Provided     Education provided:   - HEP  - keeping exercises in a pain free range    Written Home Exercises Provided: yes.  Exercises were reviewed and Annika was able to demonstrate them prior to the end of the session.  Annika demonstrated fair  understanding of the education provided.      See EMR under Patient Instructions for exercises provided 6/30/2020.      Assessment     Annika required frequent sitting rest breaks during the session due to complaints of low back pain with prolonged standing. She also required frequent verbal cues to keep her exercises in a pain free range. Table slides were held today due to complaints of increased low back pain with the exercises at home. Her isometrics were performed seated with resistance provided by PT. She required occasional tactile cues for hand positioning to avoid irritating her L wrist and hand.     Annika is progressing well towards her goals.   Pt prognosis is Good.     Pt will continue to benefit from skilled outpatient physical therapy to address the deficits listed in the problem list box on initial evaluation, provide pt/family education and to maximize pt's level of independence in the home and community environment.     Pt's spiritual, cultural and educational needs considered and pt agreeable to plan of care and goals.    Anticipated Barriers for therapy: transportation, pain tolerance, apprehension       Goals:  Short Term Goals:  4 weeks  1. Patient will be compliant with HEP to promote the independent management of current diagnosis. In progress, not met  2. Patient will increase left shoulder flexion to 90 degrees for function of dressing. In progress, not met  3. Patient will increase left shoulder functional ER to reach C2 for function of  grooming. In progress, not met  4. Patient will report a decrease in complaints of left shoulder pain to 6/10 during performance of ADL's for independence of self care activities.  In progress, not met        Long Term Goals:  8 weeks  1. Patient will increase left shoulder strength to 4-/5 to improve tolerance to light house work. In progress, not met  2. Patient will increase left shoulder flexion to 135 degrees in order to place dishes in overhead cabinets independently for self care independence.  In progress, not met  3. Patient will increase left shoulder functional IR to reach T7 for function of dressing. In progress, not met  4. Patient will increase scapular stabilization strength to 4/5 to improve tolerance to normal lifting. In progress, not met      Plan     Continue with planned PT POC as tolerated. Resume table exercises next visit and begin pec stretch.      Talia Swanson, PT 1/6

## 2020-07-13 DIAGNOSIS — E11.42 TYPE 2 DIABETES MELLITUS WITH DIABETIC POLYNEUROPATHY: ICD-10-CM

## 2020-07-13 DIAGNOSIS — Z79.4 TYPE 2 DIABETES MELLITUS WITHOUT COMPLICATION, WITH LONG-TERM CURRENT USE OF INSULIN: ICD-10-CM

## 2020-07-13 DIAGNOSIS — E11.9 TYPE 2 DIABETES MELLITUS WITHOUT COMPLICATION, WITH LONG-TERM CURRENT USE OF INSULIN: ICD-10-CM

## 2020-07-13 NOTE — TELEPHONE ENCOUNTER
----- Message from Shawanda Cisneros sent at 7/13/2020  9:22 AM CDT -----  Pt states she would like to speak with nurse to resend her test strips and her lantus to pharmacy [facility] [address] [address] [address] AT [facility] [address] [phone] (Phone)  [phone] (Fax)

## 2020-07-17 ENCOUNTER — TELEPHONE (OUTPATIENT)
Dept: ENDOCRINOLOGY | Facility: CLINIC | Age: 70
End: 2020-07-17

## 2020-07-22 ENCOUNTER — CLINICAL SUPPORT (OUTPATIENT)
Dept: REHABILITATION | Facility: HOSPITAL | Age: 70
End: 2020-07-22
Payer: MEDICARE

## 2020-07-22 DIAGNOSIS — M62.81 MUSCLE WEAKNESS: ICD-10-CM

## 2020-07-22 DIAGNOSIS — M79.642 PAIN IN BOTH HANDS: ICD-10-CM

## 2020-07-22 DIAGNOSIS — M25.642 DECREASED RANGE OF MOTION OF LEFT THUMB: ICD-10-CM

## 2020-07-22 DIAGNOSIS — M79.641 PAIN IN BOTH HANDS: ICD-10-CM

## 2020-07-22 DIAGNOSIS — M62.81 MUSCLE WEAKNESS OF LEFT ARM: ICD-10-CM

## 2020-07-22 DIAGNOSIS — M25.612 DECREASED ROM OF LEFT SHOULDER: Primary | ICD-10-CM

## 2020-07-22 PROCEDURE — 97018 PARAFFIN BATH THERAPY: CPT | Mod: PO

## 2020-07-22 PROCEDURE — 97110 THERAPEUTIC EXERCISES: CPT | Mod: PO,CQ

## 2020-07-22 PROCEDURE — 97140 MANUAL THERAPY 1/> REGIONS: CPT | Mod: PO

## 2020-07-22 PROCEDURE — 97110 THERAPEUTIC EXERCISES: CPT | Mod: PO

## 2020-07-22 NOTE — PROGRESS NOTES
"  Occupational Therapy Daily Treatment Note     Date: 7/22/2020  Name: Annika Mac  Clinic Number: [id number]    Therapy Diagnosis:   Encounter Diagnoses   Name Primary?    Pain in both hands     Decreased range of motion of left thumb     Muscle weakness      Physician: Marky Hagen MD    Physician Orders: Eval & treat  Medical Diagnosis: B Hand Pain; CMC arthritis & De Quervain's  Surgical Procedure and Date: none  Evaluation Date: 07/09/20  Insurance Authorization Period Expiration: 12/31/20  Plan of Care Certification Period: 07/09/20 to 08/19/20  Date of Return to MD: TBD    Visit # / Visits authorized: 2 / 20    Time In: 11:00 am  Time Out: 11:45 am   Total Billable Time:  45 minutes (P, 1MT, 1TE)    Precautions:  Standard and Diabetes      Subjective     Pt reports: "with the pain I've been having in my L wrist I may need to have that surgery  Response to previous treatment:No adverse reactions  Functional change: able to use both wrist/hands in light self-care tasks    Pain: R=4-5/10;  L= 5-6/10 pre-tx  Location: bilateral wrists/hands      Objective     Annika received supervised modality for 10 minutes, as follows:  -Paraffin w/ MHP to B wrist/hands, pre-tx to decrease pain & increase tissue extensibility    Annika received the following manual therapy techniques for 10 minutes:   -Edema mgmt w/ lymphatic drainage technique;  Deep STM, including MFR, CFM, TPR & scar mgmt to B forearm/wrist/hands, using hand techniques and IASTM tools to increase blood flow/circulation, improve soft tissue pliability and decrease pain.    Annika received therapeutic exercises for 25 minutes direct care including:  B wrist/hand    Dexterciser  3 min   Isospheres 3 min   9 Peg  1 trial each hand   Ergo Gripper 10 reps, 2 red & 2 yellow bands       T-putty: yellow   -Molding 3 min each   -grasp/manipulation (not today) 3 reps each   -dowel digs 3 min   -log rolls w/ tripod pinch 2 logs       Home Exercises and Education Provided "     Education provided:   - Reviewed HEP    Written Home Exercises Provided: Patient instructed to cont prior HEP.  Exercises were reviewed and Annika was able to demonstrate them prior to the end of the session.  Annika demonstrated good  understanding of the HEP provided.     See EMR under Patient Instructions for exercises provided prior visit.        Assessment     Patient was able to perform all therapeutic exercises without any increase in pain, but she did have some difficulty with coordination.  Annika is progressing well towards her goals and there are no updates to goals at this time. Pt prognosis is Good.     Pt will continue to benefit from skilled outpatient occupational therapy to address the deficits listed in the problem list on initial evaluation provide pt/family education and to maximize pt's level of independence in the home and community environment.     Anticipated barriers to occupational therapy: Arthritis, Diabetes    Pt's spiritual, cultural and educational needs considered and pt agreeable to plan of care and goals.    Goals:  Short Term Goals: (in 2 weeks)  1) Patient will be independent in HEP  2) Decrease pain in B hands/thumbs to no more than 6-7/10 worst in ADL/IADL's  3) Increase AROM in L thumb Rad/Palm ABD by 3-5 degrees for improved functioning in ADL/IADL's  4) Increase B  strength by 3-5 psi for increased functional use  5) Decrease edema in L wrist/thumb to trace      Long Term Goals: (in 6 weeks)  1) Decrease pain in B hands/thumbs to no more than 2-3/10 worst in ADL/IADL's  2) Increase AROM of B hands/thumbs to WFL for increased functioning in ADL/IADL's  3) Increase strength in B  by 20% of initial measures for improved functioning in ADL/IADL's  4) Increased functioning in ADL/IADL's, as evidenced by a FOTO impairment rating of no more than 43%  5) Decrease edema in B wrist/hand to trace or none       Plan     Certification Period/Plan of care expiration: 7/9/2020 to  08/03/20.     Outpatient Occupational Therapy 2 times weekly for 6 weeks to include the following interventions: Paraffin, Fluidotherapy, Manual therapy/joint mobilizations, Modalities for pain management, US 3 mhz, Therapeutic exercises/activities., Strengthening, Orthotic Fabrication/Fit/Training, Edema Control, Electrical Modalities and Joint Protection.       Pratik Veloz, OT

## 2020-07-22 NOTE — PROGRESS NOTES
"  Physical Therapy Daily Treatment Note     Name: Annika Mac  Clinic Number: [id number]    Therapy Diagnosis:   Encounter Diagnoses   Name Primary?    Decreased ROM of left shoulder Yes    Muscle weakness of left arm      Physician: IGLESIA Garcia MD    Visit Date: 7/22/2020    Physician Orders: PT Eval and Treat   Medical Diagnosis from Referral: M25.512,G89.29 (ICD-10-CM) - Chronic left shoulder pain   Evaluation Date: 6/30/2020  Authorization Period Expiration: 06/17/2021   Plan of Care Expiration: 8/30/2020  Visit # / Visits authorized: 4 / 20     Time In: 12:00 am  Time Out: 12:35 am  Total Appointment Time (timed & untimed codes): 35 minutes     Precautions: Standard and Fall      Subjective     Pt reports: continued back pain with fwd trunk flexion.   She was compliant with home exercise program.  Response to previous treatment: increased soreness  Functional change: improved AROM    Pain: 5/10  Location: left shoulder      Objective     Annika received therapeutic exercises to develop strength, ROM, flexibility and posture for 35 minutes 1:1 with PTA including:     Date  7/22/2020 7/9/2020 7/7/2020 6/30/2020   VISIT 4/20 3/20 2/20 1/1   POC EXP. DATE 8/30/2020 8/30/2020 8/30/2020 8/30/2020   VISIT AMOUNT  MEDICARE TOTAL 60.64  325.44 60.64  264.80 90.96  204.16 113.20   FACE-TO-FACE 7/30/2020 7/30/2020 7/30/2020 7/30/2020   FOTO 4/5 3/5 2/5 1/5            Pulley Flexion  2' 2' 2' next   TABLE:        Table slide: flexion 2 x 10 Not today 1 x 15 1 x 10   Table slide: abduction 2 x 10 Not today 1 x 15 1 x 10   Table slide: ER 2 x 10 Not today 1 x 15 next   Scapular retractions 20 x 3" 15 x 3" 15 x 3" 10 x 3"   Posterior shoulder roll 2 x 10 1 x 15 1 x 15 next   Wand flexion 2 x 10 1 x 15 1 x 15 next   Wand ABD 2 x 10 1 x 15 1 x 15    Shoulder isometrics:  - Flexion  - Extension  - IR  - ER   10 x 3"  10 x 3"  10 x 3'  10 x 3'   10 x 3"  10 x 3"  10 x 3"  10 x 3"   10 x 3"  10 x 3"  10 x 3"  10 x 3"    1 x " "10  1 x 10  1 x 10  1 x 10   Shldr AROM Next?                         STANDING:        Codman's NT 10 x ea  cw/ccw 10 x ea Cw/Ccw 1'   Pec str 3 x 15" Next? Next?     IR stretch with strap 3 x 15" 1 x 10 Next?                                         Initials SB DG SB MA         Home Exercises and Patient Education Provided     Education provided:   - HEP  - keeping exercises in a pain free range    Written Home Exercises Provided: yes.  Exercises were reviewed and Annika was able to demonstrate them prior to the end of the session.  Annika demonstrated fair  understanding of the education provided.      See EMR under Patient Instructions for exercises provided 6/30/2020.      Assessment     Annika required frequent sitting rest breaks during the session due to complaints of low back pain with prolonged standing. Her isometrics were performed seated with resistance provided by PTA. Pt is highly limited on exercise routine due to poor activity and standing tolerance.     Annika is progressing slowly towards her goals.   Pt prognosis is Good.     Pt will continue to benefit from skilled outpatient physical therapy to address the deficits listed in the problem list box on initial evaluation, provide pt/family education and to maximize pt's level of independence in the home and community environment.     Pt's spiritual, cultural and educational needs considered and pt agreeable to plan of care and goals.    Anticipated Barriers for therapy: transportation, pain tolerance, apprehension       Goals:  Short Term Goals:  4 weeks  1. Patient will be compliant with HEP to promote the independent management of current diagnosis. In progress, not met  2. Patient will increase left shoulder flexion to 90 degrees for function of dressing. In progress, not met  3. Patient will increase left shoulder functional ER to reach C2 for function of grooming. In progress, not met  4. Patient will report a decrease in complaints of left shoulder " pain to 6/10 during performance of ADL's for independence of self care activities.  In progress, not met        Long Term Goals:  8 weeks  1. Patient will increase left shoulder strength to 4-/5 to improve tolerance to light house work. In progress, not met  2. Patient will increase left shoulder flexion to 135 degrees in order to place dishes in overhead cabinets independently for self care independence.  In progress, not met  3. Patient will increase left shoulder functional IR to reach T7 for function of dressing. In progress, not met  4. Patient will increase scapular stabilization strength to 4/5 to improve tolerance to normal lifting. In progress, not met      Plan     Continue with planned PT POC as tolerated. Add shoulder AROM on mat next visit.     Irena Barron, PTA 1/6

## 2020-07-27 ENCOUNTER — CLINICAL SUPPORT (OUTPATIENT)
Dept: REHABILITATION | Facility: HOSPITAL | Age: 70
End: 2020-07-27
Payer: MEDICARE

## 2020-07-27 DIAGNOSIS — M25.612 DECREASED ROM OF LEFT SHOULDER: ICD-10-CM

## 2020-07-27 DIAGNOSIS — M62.81 MUSCLE WEAKNESS OF LEFT ARM: ICD-10-CM

## 2020-07-27 DIAGNOSIS — M79.641 PAIN IN BOTH HANDS: ICD-10-CM

## 2020-07-27 DIAGNOSIS — M79.642 PAIN IN BOTH HANDS: ICD-10-CM

## 2020-07-27 DIAGNOSIS — M62.81 MUSCLE WEAKNESS: ICD-10-CM

## 2020-07-27 DIAGNOSIS — M25.642 DECREASED RANGE OF MOTION OF LEFT THUMB: ICD-10-CM

## 2020-07-27 PROCEDURE — 97018 PARAFFIN BATH THERAPY: CPT | Mod: PO,59

## 2020-07-27 PROCEDURE — 97110 THERAPEUTIC EXERCISES: CPT | Mod: PO

## 2020-07-27 PROCEDURE — 97140 MANUAL THERAPY 1/> REGIONS: CPT | Mod: PO

## 2020-07-27 NOTE — PROGRESS NOTES
"  Physical Therapy Daily Treatment Note     Name: Annika Mac  Clinic Number: [id number]    Therapy Diagnosis:   Encounter Diagnoses   Name Primary?    Decreased ROM of left shoulder     Muscle weakness of left arm      Physician: IGLESIA Garcia MD    Visit Date: 7/27/2020    Physician Orders: PT Eval and Treat   Medical Diagnosis from Referral: M25.512,G89.29 (ICD-10-CM) - Chronic left shoulder pain   Evaluation Date: 6/30/2020  Authorization Period Expiration: 06/17/2021   Plan of Care Expiration: 8/30/2020  Visit # / Visits authorized: 5 / 20     Time In: 11:00 am  Time Out: 11:35 am  Total Appointment Time (timed & untimed codes): 35 minutes     Precautions: Standard and Fall      Subjective     Pt reports: having continued left shoulder pain with reaching activities, but has noticed improved mobility since beginning therapy.  She was compliant with home exercise program.  Response to previous treatment: increased soreness  Functional change: improved AROM    Pain: 4/10  Location: left shoulder      Objective     Annika received therapeutic exercises to develop strength, ROM, flexibility and posture for 38 minutes 1:1 with PT including:     Date  7/27/2020 7/22/2020 7/9/2020 7/7/2020 6/30/2020   VISIT 5/20 4/20 3/20 2/20 1/1   POC EXP. DATE 8/30/2020 8/30/2020 8/30/2020 8/30/2020 8/30/2020   VISIT AMOUNT  MEDICARE TOTAL 90.96  416.40 60.64  325.44 60.64  264.80 90.96  204.16 113.20   FACE-TO-FACE 7/30/2020 7/30/2020 7/30/2020 7/30/2020 7/30/2020   FOTO 5/5 4/5 3/5 2/5 1/5             Pulley Flexion  2' 2' 2' 2' next   TABLE:         Table slide: flexion -- 2 x 10 Not today 1 x 15 1 x 10   Table slide: abduction -- 2 x 10 Not today 1 x 15 1 x 10   Table slide: ER -- 2 x 10 Not today 1 x 15 next   Scapular retractions 20 x 3" 20 x 3" 15 x 3" 15 x 3" 10 x 3"   Posterior shoulder roll 2 x 10 2 x 10 1 x 15 1 x 15 next   Wand flexion 2 x 10 2 x 10 1 x 15 1 x 15 next   Wand ABD 2 x 10 2 x 10 1 x 15 1 x 15    Scapular " "protractions 1 x 15       Reverse codman's 20 cw/ccw       Shoulder isometrics:  - Flexion  - Extension  - IR  - ER Active  1 x 15 RTB  1 x 15 RTB  1 x 15 RTB  1 x 15 RTB   10 x 3"  10 x 3"  10 x 3'  10 x 3'   10 x 3"  10 x 3"  10 x 3"  10 x 3"   10 x 3"  10 x 3"  10 x 3"  10 x 3"    1 x 10  1 x 10  1 x 10  1 x 10   Shldr AROM: seated  Flexion  scaption  abduction   1 x 10  1 x 10  1 x 10 Next?                           STANDING:         Codman's NT NT 10 x ea  cw/ccw 10 x ea Cw/Ccw 1'   Pec str -- 3 x 15" Next? Next?     IR stretch with strap 3 x 15" 3 x 15" 1 x 10 Next?                                             Initials MA SB DG SB MA      Active shoulder flexion in sittin degrees  Active shoulder abduction in sittin degrees  Functional ER reaches T3  Functional IR reach T9    Annika receives cold pack x 10 minutes to left shoulder to decrease pain and inflammation.     Home Exercises and Patient Education Provided     Education provided:   - HEP  - keeping exercises in a pain free range    Written Home Exercises Provided: yes.  Exercises were reviewed and Annika was able to demonstrate them prior to the end of the session.  Annika demonstrated fair  understanding of the education provided.      See EMR under Patient Instructions for exercises provided 2020.      Assessment     Annika is noted to have significant improvement in left shoulder AROM as compared to evaluation.  She continues to report pain limiting tolerance to reaching activities during ADL's and normal house work.  Pt was able to progress with shoulder AROM and strengthening exercises on this date and will continue with progression as tolerated.  She has limited tolerance to standing due to back pain   Pt achieves STG's #2 and 3.    Annika is progressing slowly towards her goals.   Pt prognosis is Good.     Pt will continue to benefit from skilled outpatient physical therapy to address the deficits listed in the problem list box on initial " evaluation, provide pt/family education and to maximize pt's level of independence in the home and community environment.     Pt's spiritual, cultural and educational needs considered and pt agreeable to plan of care and goals.    Anticipated Barriers for therapy: transportation, pain tolerance, apprehension       Goals:  Short Term Goals:  4 weeks  1. Patient will be compliant with HEP to promote the independent management of current diagnosis. In progress, not met  2. Patient will increase left shoulder flexion to 90 degrees for function of dressing. met  3. Patient will increase left shoulder functional ER to reach C2 for function of grooming. met  4. Patient will report a decrease in complaints of left shoulder pain to 6/10 during performance of ADL's for independence of self care activities.  In progress, not met        Long Term Goals:  8 weeks  1. Patient will increase left shoulder strength to 4-/5 to improve tolerance to light house work. In progress, not met  2. Patient will increase left shoulder flexion to 135 degrees in order to place dishes in overhead cabinets independently for self care independence.  In progress, not met  3. Patient will increase left shoulder functional IR to reach T7 for function of dressing. In progress, not met  4. Patient will increase scapular stabilization strength to 4/5 to improve tolerance to normal lifting. In progress, not met      Plan     Continue with planned PT POC as tolerated. Progress AROM exercises next visit.     Marino López, PT 1/6

## 2020-07-27 NOTE — PROGRESS NOTES
Occupational Therapy Daily Treatment Note     Date: 7/27/2020  Name: Annika Mac  Clinic Number: [id number]    Therapy Diagnosis:   Encounter Diagnoses   Name Primary?    Pain in both hands     Decreased range of motion of left thumb     Muscle weakness      Physician: Marky Hagen MD    Physician Orders: Eval & treat  Medical Diagnosis: B Hand Pain; CMC arthritis & De Quervain's  Surgical Procedure and Date: none  Evaluation Date: 07/09/20  Insurance Authorization Period Expiration: 12/31/20  Plan of Care Certification Period: 07/09/20 to 08/19/20  Date of Return to MD: TBD    Visit # / Visits authorized: 3 / 20    Time In: 11:45 am  Time Out:  12:30 pm   Total Billable Time:  45 minutes (P, 1MT, 1TE)    Precautions:  Standard and Diabetes      Subjective     Pt reports: patient states her doctor's office will call her with the next available appointment time to discuss surgery  Response to previous treatment:No adverse reactions  Functional change: able to use both wrist/hands in light self-care tasks    Pain: R=4/10;  L= 5/10 pre-tx  Location: bilateral wrists/hands      Objective     Annika received supervised modality for 10 minutes, as follows:  -Paraffin w/ MHP to B wrist/hands, pre-tx to decrease pain & increase tissue extensibility    Annika received the following manual therapy techniques for 10 minutes:   -Edema mgmt w/ lymphatic drainage technique;  Deep STM, including MFR, CFM, TPR & scar mgmt to B forearm/wrist/hands, using hand techniques and IASTM tools to increase blood flow/circulation, improve soft tissue pliability and decrease pain.    Annika received therapeutic exercises for 25 minutes direct care including:  B wrist/hand    Dexterciser  3 min   9 Peg  1 trial each hand   Ergo Gripper 2/10 reps, 2 red & 2 yellow bands       T-putty: yellow   -Molding 2 min each   -grasp/manipulation (not today) 3 reps each   -dowel digs 3 min   -log rolls w/ tripod pinch 2 logs       Home Exercises and  Education Provided     Education provided:   - Added thumb extension abduction stretch to HEP    Written Home Exercises Provided: Patient instructed to cont prior HEP.  Exercises were reviewed and Annika was able to demonstrate them prior to the end of the session.  Annika demonstrated good  understanding of the HEP provided.     See EMR under Patient Instructions for exercises provided prior visit.        Assessment     Patient was noted to have muscle spasms and tightness in B thenar muscles today, with L side being worse than R side, however, did improve after manual and stretching exercise.  Some exercises were eliminated and others downgraded to decrease potential for muscles tightening again.  She was able to perform all listed therapeutic exercises without any increase in pain.    Annika is progressing well towards her goals and there are no updates to goals at this time. Pt prognosis is Good.     Pt will continue to benefit from skilled outpatient occupational therapy to address the deficits listed in the problem list on initial evaluation provide pt/family education and to maximize pt's level of independence in the home and community environment.     Anticipated barriers to occupational therapy: Arthritis, Diabetes    Pt's spiritual, cultural and educational needs considered and pt agreeable to plan of care and goals.    Goals:  Short Term Goals: (in 2 weeks)  1) Patient will be independent in HEP  2) Decrease pain in B hands/thumbs to no more than 6-7/10 worst in ADL/IADL's  3) Increase AROM in L thumb Rad/Palm ABD by 3-5 degrees for improved functioning in ADL/IADL's  4) Increase B  strength by 3-5 psi for increased functional use  5) Decrease edema in L wrist/thumb to trace      Long Term Goals: (in 6 weeks)  1) Decrease pain in B hands/thumbs to no more than 2-3/10 worst in ADL/IADL's  2) Increase AROM of B hands/thumbs to WFL for increased functioning in ADL/IADL's  3) Increase strength in B  by 20%  of initial measures for improved functioning in ADL/IADL's  4) Increased functioning in ADL/IADL's, as evidenced by a FOTO impairment rating of no more than 43%  5) Decrease edema in B wrist/hand to trace or none       Plan     Certification Period/Plan of care expiration: 7/9/2020 to 08/03/20.     Outpatient Occupational Therapy 2 times weekly for 6 weeks to include the following interventions: Paraffin, Fluidotherapy, Manual therapy/joint mobilizations, Modalities for pain management, US 3 mhz, Therapeutic exercises/activities., Strengthening, Orthotic Fabrication/Fit/Training, Edema Control, Electrical Modalities and Joint Protection.       Pratik Veloz, OT

## 2020-07-28 ENCOUNTER — OFFICE VISIT (OUTPATIENT)
Dept: PODIATRY | Facility: CLINIC | Age: 70
End: 2020-07-28
Payer: MEDICARE

## 2020-07-28 VITALS
HEIGHT: 65 IN | SYSTOLIC BLOOD PRESSURE: 114 MMHG | WEIGHT: 293 LBS | BODY MASS INDEX: 48.82 KG/M2 | DIASTOLIC BLOOD PRESSURE: 63 MMHG | HEART RATE: 72 BPM

## 2020-07-28 DIAGNOSIS — E11.22 TYPE 2 DM WITH CKD STAGE 4 AND HYPERTENSION: Primary | ICD-10-CM

## 2020-07-28 DIAGNOSIS — I12.9 TYPE 2 DM WITH CKD STAGE 4 AND HYPERTENSION: Primary | ICD-10-CM

## 2020-07-28 DIAGNOSIS — E08.43 DIABETIC AUTONOMIC NEUROPATHY ASSOCIATED WITH DIABETES MELLITUS DUE TO UNDERLYING CONDITION: ICD-10-CM

## 2020-07-28 DIAGNOSIS — L84 CORN OR CALLUS: ICD-10-CM

## 2020-07-28 DIAGNOSIS — N18.4 TYPE 2 DM WITH CKD STAGE 4 AND HYPERTENSION: Primary | ICD-10-CM

## 2020-07-28 DIAGNOSIS — B35.1 ONYCHOMYCOSIS DUE TO DERMATOPHYTE: ICD-10-CM

## 2020-07-28 PROCEDURE — 11721 PR DEBRIDEMENT OF NAILS, 6 OR MORE: ICD-10-PCS | Mod: 59,Q9,S$PBB, | Performed by: PODIATRIST

## 2020-07-28 PROCEDURE — 99999 PR PBB SHADOW E&M-EST. PATIENT-LVL V: CPT | Mod: PBBFAC,,, | Performed by: PODIATRIST

## 2020-07-28 PROCEDURE — 11721 DEBRIDE NAIL 6 OR MORE: CPT | Mod: 59,Q9,S$PBB, | Performed by: PODIATRIST

## 2020-07-28 PROCEDURE — 99213 PR OFFICE/OUTPT VISIT, EST, LEVL III, 20-29 MIN: ICD-10-PCS | Mod: 25,S$PBB,, | Performed by: PODIATRIST

## 2020-07-28 PROCEDURE — 99215 OFFICE O/P EST HI 40 MIN: CPT | Mod: PBBFAC | Performed by: PODIATRIST

## 2020-07-28 PROCEDURE — 99213 OFFICE O/P EST LOW 20 MIN: CPT | Mod: 25,S$PBB,, | Performed by: PODIATRIST

## 2020-07-28 PROCEDURE — 11056 PARNG/CUTG B9 HYPRKR LES 2-4: CPT | Mod: PBBFAC | Performed by: PODIATRIST

## 2020-07-28 PROCEDURE — 99999 PR PBB SHADOW E&M-EST. PATIENT-LVL V: ICD-10-PCS | Mod: PBBFAC,,, | Performed by: PODIATRIST

## 2020-07-28 PROCEDURE — 11721 DEBRIDE NAIL 6 OR MORE: CPT | Mod: PBBFAC,59 | Performed by: PODIATRIST

## 2020-07-28 PROCEDURE — 11056 PR TRIM BENIGN HYPERKERATOTIC SKIN LESION,2-4: ICD-10-PCS | Mod: Q9,S$PBB,, | Performed by: PODIATRIST

## 2020-07-28 PROCEDURE — 11056 PARNG/CUTG B9 HYPRKR LES 2-4: CPT | Mod: Q9,S$PBB,, | Performed by: PODIATRIST

## 2020-07-28 RX ORDER — AMMONIUM LACTATE 12 G/100G
CREAM TOPICAL
Qty: 140 G | Refills: 11 | Status: SHIPPED | OUTPATIENT
Start: 2020-07-28 | End: 2023-01-12

## 2020-07-28 NOTE — PROGRESS NOTES
Subjective:      Patient ID: Annika Mac is a 69 y.o. female.    Chief Complaint: Diabetes Mellitus (5/6/20 dr mamie cotton) and Nail Care    Annika is a 69 y.o. female who presents to the clinic for evaluation and treatment of high risk feet. Annika has a past medical history of Allergy, Anemia (7/27/2012), Anticoagulant long-term use, Arthritis, Asthma, CHF (congestive heart failure), CKD (chronic kidney disease) stage 3, GFR 30-59 ml/min (7/27/2012), Clotting disorder, Colon polyp, Deep vein thrombophlebitis of left leg (7/27/2012), Degenerative disc disease, Diabetic peripheral neuropathy associated with type 2 diabetes mellitus (7/27/2012), GERD (gastroesophageal reflux disease), HTN (hypertension) (7/27/2012), Hyperlipidemia (7/27/2012), Lead-induced gout of ankle or foot, Nonproliferative diabetic retinopathy of right eye (7/27/2012), Obstructive sleep apnea (7/27/2012), Osteoporosis, Proliferative diabetic retinopathy of left eye (7/27/2012), Stroke (8/1/2003), Type 2 diabetes mellitus with diabetic polyneuropathy (7/27/2012), and Ulcer. The patient's chief complaint is long, thick toenails and calluses on both feet . No other major pedal complaintsThis patient has documented high risk feet requiring routine maintenance secondary to diabetes mellitis and those secondary complications of diabetes, as mentioned..  In addition she is here for routine diabetic foot evaluation as well as routine care and increasing painful calluses to both heels.  PCP: Mamie Cotton MD    Date Last Seen by PCP:   Chief Complaint   Patient presents with    Diabetes Mellitus     5/6/20 dr mamie cotton    Nail Care           Chief Complaint   Patient presents with    Diabetes Mellitus     5/6/20 dr mamie cotton    Nail Care       Current shoe gear: black leather DM shoes w/ custom inserts     Hemoglobin A1C   Date Value Ref Range Status   07/02/2020 6.7 (H) 4.0 - 5.6 % Final     Comment:     ADA Screening  Guidelines:  5.7-6.4%  Consistent with prediabetes  >or=6.5%  Consistent with diabetes  High levels of fetal hemoglobin interfere with the HbA1C  assay. Heterozygous hemoglobin variants (HbS, HgC, etc)do  not significantly interfere with this assay.   However, presence of multiple variants may affect accuracy.     03/13/2020 7.8 (H) 4.0 - 5.6 % Final     Comment:     ADA Screening Guidelines:  5.7-6.4%  Consistent with prediabetes  >or=6.5%  Consistent with diabetes  High levels of fetal hemoglobin interfere with the HbA1C  assay. Heterozygous hemoglobin variants (HbS, HgC, etc)do  not significantly interfere with this assay.   However, presence of multiple variants may affect accuracy.     11/13/2019 8.2 (H) 4.0 - 5.6 % Final     Comment:     ADA Screening Guidelines:  5.7-6.4%  Consistent with prediabetes  >or=6.5%  Consistent with diabetes  High levels of fetal hemoglobin interfere with the HbA1C  assay. Heterozygous hemoglobin variants (HbS, HgC, etc)do  not significantly interfere with this assay.   However, presence of multiple variants may affect accuracy.         Review of Systems   Constitution: Negative for chills, decreased appetite and fever.   Cardiovascular: Negative for leg swelling.   Skin: Positive for dry skin and nail changes. Negative for color change, flushing, itching, poor wound healing, rash and skin cancer.   Musculoskeletal: Positive for arthritis. Negative for back pain, gout, joint pain, joint swelling and myalgias.   Gastrointestinal: Negative for nausea and vomiting.   Neurological: Positive for numbness. Negative for loss of balance and paresthesias.           Objective:      Physical Exam   Constitutional: She is oriented to person, place, and time. She appears well-developed and well-nourished. No distress.   Cardiovascular:   Dorsalis pedis and posterior tibial pulses are diminished Bilaterally. Toes are cool to touch. Feet are warm proximally.There is decreased digital hair. Skin  is atrophic, slightly hyperpigmented, and mildly edematous       Musculoskeletal: Normal range of motion. She exhibits no edema, tenderness or deformity.   Equinus noted b/l ankles, gastroc. Adequate joint range of motion without pain, limitation, nor crepitation Bilateral pedal joints. Muscle strength is 5/5 in all groups bilaterally.        Neurological: She is alert and oriented to person, place, and time.   Freeman-Vincent 5.07 monofilamant testing is diminished [place] feet. Sharp/dull sensation diminished Bilaterally. Light touch absent Bilaterally.       Skin: Skin is warm, dry and intact. No abrasion, no bruising, no burn, no ecchymosis, no laceration, no lesion, no petechiae and no rash noted. She is not diaphoretic. No pallor.   Nails 1-5 b/l  are elongated by  2-6 mm's, thickened by 3-5 mm's, dystrophic, and are darkened in  coloration . Xerosis Bilaterally. No open lesions noted.      Hyperkeratotic tissue noted to distal 2-4 toe tips b/l.  dry skin with tender fissures noted to the posterior plantar aspect of bilateral heels.  Psychiatric: She has a normal mood and affect. Thought content normal.   Nursing note and vitals reviewed.            Assessment:       Encounter Diagnoses   Name Primary?    Type 2 DM with CKD stage 4 and hypertension Yes    Onychomycosis due to dermatophyte     Corn or callus     Diabetic autonomic neuropathy associated with diabetes mellitus due to underlying condition          Plan:       Annika was seen today for diabetes mellitus and nail care.    Diagnoses and all orders for this visit:    Type 2 DM with CKD stage 4 and hypertension    Onychomycosis due to dermatophyte    Corn or callus    Diabetic autonomic neuropathy associated with diabetes mellitus due to underlying condition    Other orders  -     ammonium lactate 12 % Crea; Apply twice daily to affected parts both feet as needed.          Routine Foot Care    Performed by:  Jean Carlos Pacheco. LISSY  Authorized by:   Patient     Consent Done?:  Yes (Verbal)     Nail Care Type:  Debride  Location(s): All  (Left 1st Toe, Left 3rd Toe, Left 2nd Toe, Left 4th Toe, Left 5th Toe, Right 1st Toe, Right 2nd Toe, Right 3rd Toe, Right 4th Toe and Right 5th Toe)  Patient tolerance:  Patient tolerated the procedure well with no immediate complications     With patient's permission, the toenails mentioned above were aggressively reduced and debrided using a nail nipper, removing all offending nail and debris. The patient will continue to monitor the areas daily, inspect the feet, wear protective shoe gear when ambulatory, and moisturizer to maintain skin integrity.      Callus Care Type: Debride    With patient's permission, the calluses/hyperkeratotic lesions mentioned above were aggressively reduced and debrided using a number 15 blade. The patient will continue to monitor the areas daily, inspect the feet, wear protective shoe gear when ambulatory, and moisturizer to maintain skin integrity.       I counseled the patient on her conditions, their implications and medical management.        - Shoe inspection. Diabetic Foot Education. Patient reminded of the importance of good nutrition and blood sugar control to help prevent podiatric complications of diabetes. Patient instructed on proper foot hygeine. We discussed wearing proper shoe gear, daily foot inspections, never walking without protective shoe gear, never putting sharp instruments to feet, routine podiatric nail visits every 3 months.

## 2020-07-29 ENCOUNTER — CLINICAL SUPPORT (OUTPATIENT)
Dept: REHABILITATION | Facility: HOSPITAL | Age: 70
End: 2020-07-29
Payer: MEDICARE

## 2020-07-29 DIAGNOSIS — M62.81 MUSCLE WEAKNESS OF LEFT ARM: ICD-10-CM

## 2020-07-29 DIAGNOSIS — M25.612 DECREASED ROM OF LEFT SHOULDER: ICD-10-CM

## 2020-07-29 DIAGNOSIS — M79.641 PAIN IN BOTH HANDS: ICD-10-CM

## 2020-07-29 DIAGNOSIS — M25.642 DECREASED RANGE OF MOTION OF LEFT THUMB: ICD-10-CM

## 2020-07-29 DIAGNOSIS — M79.642 PAIN IN BOTH HANDS: ICD-10-CM

## 2020-07-29 PROBLEM — M25.532 PAIN IN BOTH WRISTS: Status: RESOLVED | Noted: 2019-06-19 | Resolved: 2020-07-29

## 2020-07-29 PROBLEM — M25.531 PAIN IN BOTH WRISTS: Status: RESOLVED | Noted: 2019-06-19 | Resolved: 2020-07-29

## 2020-07-29 PROBLEM — R29.3 POSTURE IMBALANCE: Status: RESOLVED | Noted: 2019-07-15 | Resolved: 2020-07-29

## 2020-07-29 PROBLEM — R26.9 GAIT ABNORMALITY: Status: RESOLVED | Noted: 2019-07-15 | Resolved: 2020-07-29

## 2020-07-29 PROBLEM — M25.631 JOINT STIFFNESS OF BOTH WRISTS: Status: RESOLVED | Noted: 2019-06-19 | Resolved: 2020-07-29

## 2020-07-29 PROBLEM — M25.632 JOINT STIFFNESS OF BOTH WRISTS: Status: RESOLVED | Noted: 2019-06-19 | Resolved: 2020-07-29

## 2020-07-29 PROBLEM — R29.898 LOWER EXTREMITY WEAKNESS: Status: RESOLVED | Noted: 2019-07-15 | Resolved: 2020-07-29

## 2020-07-29 PROBLEM — Z74.09 DECREASED FUNCTIONAL MOBILITY AND ENDURANCE: Status: RESOLVED | Noted: 2019-07-15 | Resolved: 2020-07-29

## 2020-07-29 PROCEDURE — 97110 THERAPEUTIC EXERCISES: CPT | Mod: PO

## 2020-07-29 PROCEDURE — 97018 PARAFFIN BATH THERAPY: CPT | Mod: PO,59

## 2020-07-29 PROCEDURE — 97140 MANUAL THERAPY 1/> REGIONS: CPT | Mod: PO

## 2020-07-29 NOTE — PROGRESS NOTES
Occupational Therapy Daily Treatment Note     Date: 7/29/2020  Name: Annika Mac  [facility] Number: [id number]    Therapy Diagnosis:   Encounter Diagnoses   Name Primary?    Pain in both hands     Decreased range of motion of left thumb      Physician: Marky Hagen MD    Physician Orders: Eval & treat  Medical Diagnosis: B Hand Pain; CMC arthritis & De Quervain's  Surgical Procedure and Date: none  Evaluation Date: 07/09/20  Insurance Authorization Period Expiration: 12/31/20  Plan of Care Certification Period: 07/09/20 to 08/19/20  Date of Return to MD: TBD    Visit # / Visits authorized: 4 / 20    Time In: 11:45 am  Time Out:  12:30 pm   Total Billable Time:  45 minutes (P, 1MT, 1TE)    Precautions:  Standard and Diabetes      Subjective     Pt reports: patient states her doctor's office will call her with the next available appointment time to discuss surgery  Response to previous treatment:No adverse reactions  Functional change: able to use both wrist/hands in light self-care tasks    Pain: R=3/10;  L= 3-4/10 pre-tx  Location: bilateral wrists/hands      Objective     Annika received supervised modality for 10 minutes, as follows:  -Paraffin w/ MHP to B wrist/hands, pre-tx to decrease pain & increase tissue extensibility    Annika received the following manual therapy techniques for 10 minutes:   -Edema mgmt w/ lymphatic drainage technique;  Deep STM, including MFR, CFM, TPR & scar mgmt to B forearm/wrist/hands, using hand techniques and IASTM tools to increase blood flow/circulation, improve soft tissue pliability and decrease pain.    Annika received therapeutic exercises for 25 minutes direct care including:  B wrist/hand    Dexterciser  3 min   9 Peg  1 trial each hand   Ergo Gripper 2/10 reps, 2 red & 2 yellow bands       T-putty: yellow   -Molding 2 min each   -grasp/manipulation (not today) 3 reps each   -log rolls w/ tripod pinch 1 logs       Home Exercises and Education Provided     Education provided:   -  Added thumb extension abduction stretch to HEP    Written Home Exercises Provided: Patient instructed to cont prior HEP.  Exercises were reviewed and Annika was able to demonstrate them prior to the end of the session.  Annika demonstrated good  understanding of the HEP provided.     See EMR under Patient Instructions for exercises provided prior visit.        Assessment     Patient with less myofascial tightness and fibrotic tissue in B forearm-hand muscles.  She was able to perform all listed therapeutic exercises without any increase in pain.    Annika is progressing well towards her goals and there are no updates to goals at this time. Pt prognosis is Good.     Pt will continue to benefit from skilled outpatient occupational therapy to address the deficits listed in the problem list on initial evaluation provide pt/family education and to maximize pt's level of independence in the home and community environment.     Anticipated barriers to occupational therapy: Arthritis, Diabetes    Pt's spiritual, cultural and educational needs considered and pt agreeable to plan of care and goals.    Goals:  Short Term Goals: (in 2 weeks)  1) Patient will be independent in HEP  2) Decrease pain in B hands/thumbs to no more than 6-7/10 worst in ADL/IADL's  3) Increase AROM in L thumb Rad/Palm ABD by 3-5 degrees for improved functioning in ADL/IADL's  4) Increase B  strength by 3-5 psi for increased functional use  5) Decrease edema in L wrist/thumb to trace      Long Term Goals: (in 6 weeks)  1) Decrease pain in B hands/thumbs to no more than 2-3/10 worst in ADL/IADL's  2) Increase AROM of B hands/thumbs to WFL for increased functioning in ADL/IADL's  3) Increase strength in B  by 20% of initial measures for improved functioning in ADL/IADL's  4) Increased functioning in ADL/IADL's, as evidenced by a FOTO impairment rating of no more than 43%  5) Decrease edema in B wrist/hand to trace or none       Plan     Certification  Period/Plan of care expiration: 7/9/2020 to 08/03/20.     Outpatient Occupational Therapy 2 times weekly for 6 weeks to include the following interventions: Paraffin, Fluidotherapy, Manual therapy/joint mobilizations, Modalities for pain management, US 3 mhz, Therapeutic exercises/activities., Strengthening, Orthotic Fabrication/Fit/Training, Edema Control, Electrical Modalities and Joint Protection.       Pratik Veloz, OT

## 2020-07-29 NOTE — PROGRESS NOTES
"  Physical Therapy Daily Treatment Note     Name: Annika Mac  Clinic Number: [id number]    Therapy Diagnosis:   Encounter Diagnoses   Name Primary?    Decreased ROM of left shoulder     Muscle weakness of left arm      Physician: IGLESIA Garcia MD    Visit Date: 7/29/2020    Physician Orders: PT Eval and Treat   Medical Diagnosis from Referral: M25.512,G89.29 (ICD-10-CM) - Chronic left shoulder pain   Evaluation Date: 6/30/2020  Authorization Period Expiration: 06/17/2021   Plan of Care Expiration: 8/30/2020  Visit # / Visits authorized: 6/ 20     Time In: 11:00 am  Time Out: 11:45 am  Total Appointment Time (timed & untimed codes): 38 minutes     Precautions: Standard and Fall      Subjective     Pt reports: she is having less overall pain and can move her shoulder better than before   She was compliant with home exercise program.  Response to previous treatment: increased soreness  Functional change: improved AROM    Pain: 3/10  Location: left shoulder      Objective     Annika received therapeutic exercises to develop strength, ROM, flexibility and posture for 38 minutes 1:1 with PT including:     Date  07/29/2020 7/27/2020 7/22/2020 7/9/2020 7/7/2020 6/30/2020   VISIT 6/20 5/20 4/20 3/20 2/20 1/1   POC EXP. DATE 8/30/2020 8/30/2020 8/30/2020 8/30/2020 8/30/2020 8/30/2020   VISIT AMOUNT  MEDICARE TOTAL 90.96 90.96  416.40 60.64  325.44 60.64  264.80 90.96  204.16 113.20   FACE-TO-FACE 7/30/2020 7/30/2020 7/30/2020 7/30/2020 7/30/2020 7/30/2020   FOTO -- 5/5 4/5 3/5 2/5 1/5              Pulley Flexion  Not today  2' 2' 2' 2' next   TABLE:          Table slide: flexion -- -- 2 x 10 Not today 1 x 15 1 x 10   Table slide: abduction -- -- 2 x 10 Not today 1 x 15 1 x 10   Table slide: ER -- -- 2 x 10 Not today 1 x 15 next   Scapular retractions 20 x 3" 20 x 3" 20 x 3" 15 x 3" 15 x 3" 10 x 3"   Posterior shoulder roll 2 x 10  2 x 10 2 x 10 1 x 15 1 x 15 next   Wand flexion 2 x 10  2 x 10 2 x 10 1 x 15 1 x 15 next " "  Wand ABD 2 x 10  2 x 10 2 x 10 1 x 15 1 x 15    Scapular protractions 2 x 10  1 x 15       Reverse codman's 20 cw/ccw 20 cw/ccw       Shoulder isometrics:  - Flexion  - Extension  - IR  - ER Active  1 x 15 RTB  1 x 15 RTB  1 x 15 RTB  1 x 15 RTB Active  1 x 15 RTB  1 x 15 RTB  1 x 15 RTB  1 x 15 RTB   10 x 3"  10 x 3"  10 x 3'  10 x 3'   10 x 3"  10 x 3"  10 x 3"  10 x 3"   10 x 3"  10 x 3"  10 x 3"  10 x 3"    1 x 10  1 x 10  1 x 10  1 x 10   Shldr AROM: seated  Flexion  scaption  abduction   1 x 15  1 x 15   1 x 15   1 x 10  1 x 10  1 x 10 Next?                             STANDING:          Codman's NT  NT NT 10 x ea  cw/ccw 10 x ea Cw/Ccw 1'   Pec str -- -- 3 x 15" Next? Next?     IR stretch with strap Resume  3 x 15" 3 x 15" 1 x 10 Next?                                                 Initials BJ  MA SB DG SB MA      Active shoulder flexion in sittin degrees  Active shoulder abduction in sittin degrees  Functional ER reaches T3  Functional IR reach T9    Annika receives cold pack x 10 minutes to left shoulder to decrease pain and inflammation.     Home Exercises and Patient Education Provided     Education provided:   - HEP  - keeping exercises in a pain free range    Written Home Exercises Provided: yes.  Exercises were reviewed and Annika was able to demonstrate them prior to the end of the session.  Annika demonstrated fair  understanding of the education provided.      See EMR under Patient Instructions for exercises provided 2020.      Assessment     Annika had no difficulty with today's exercises, however required correction for proper positioning in order to perform exercises correctly.     Annika is progressing slowly towards her goals.   Pt prognosis is Good.     Pt will continue to benefit from skilled outpatient physical therapy to address the deficits listed in the problem list box on initial evaluation, provide pt/family education and to maximize pt's level of independence in the home and " community environment.     Pt's spiritual, cultural and educational needs considered and pt agreeable to plan of care and goals.    Anticipated Barriers for therapy: transportation, pain tolerance, apprehension       Goals:  Short Term Goals:  4 weeks  1. Patient will be compliant with HEP to promote the independent management of current diagnosis. In progress, not met  2. Patient will increase left shoulder flexion to 90 degrees for function of dressing. met  3. Patient will increase left shoulder functional ER to reach C2 for function of grooming. met  4. Patient will report a decrease in complaints of left shoulder pain to 6/10 during performance of ADL's for independence of self care activities.  In progress, not met        Long Term Goals:  8 weeks  1. Patient will increase left shoulder strength to 4-/5 to improve tolerance to light house work. In progress, not met  2. Patient will increase left shoulder flexion to 135 degrees in order to place dishes in overhead cabinets independently for self care independence.  In progress, not met  3. Patient will increase left shoulder functional IR to reach T7 for function of dressing. In progress, not met  4. Patient will increase scapular stabilization strength to 4/5 to improve tolerance to normal lifting. In progress, not met      Plan     Continue with planned PT POC as tolerated. Progress scapular strengthening next.    Muriel Cotton, PT, DPT

## 2020-08-03 ENCOUNTER — TELEPHONE (OUTPATIENT)
Dept: INTERNAL MEDICINE | Facility: CLINIC | Age: 70
End: 2020-08-03

## 2020-08-03 DIAGNOSIS — E78.5 HYPERLIPIDEMIA, UNSPECIFIED HYPERLIPIDEMIA TYPE: Primary | ICD-10-CM

## 2020-08-03 DIAGNOSIS — E55.9 VITAMIN D INSUFFICIENCY: ICD-10-CM

## 2020-08-03 DIAGNOSIS — I10 ESSENTIAL HYPERTENSION: ICD-10-CM

## 2020-08-03 NOTE — TELEPHONE ENCOUNTER
----- Message from Mary Fowler sent at 8/3/2020 10:57 AM CDT -----  Contact: Pt [phone]--  Patient Requesting Order    Order Needed:--Annual--    Communication Preference:--August--[phone]--    Additional Information:Please place annual labs for pt and link them to appointment on 09/08/20.

## 2020-08-03 NOTE — TELEPHONE ENCOUNTER
Additional labs orders in   Or can link to her other labs but has to be fasting   Or link to September appt (otherwise cancel it and link to another lab draw

## 2020-08-04 ENCOUNTER — CLINICAL SUPPORT (OUTPATIENT)
Dept: REHABILITATION | Facility: HOSPITAL | Age: 70
End: 2020-08-04
Payer: MEDICARE

## 2020-08-04 ENCOUNTER — DOCUMENTATION ONLY (OUTPATIENT)
Dept: REHABILITATION | Facility: HOSPITAL | Age: 70
End: 2020-08-04

## 2020-08-04 DIAGNOSIS — M25.612 DECREASED ROM OF LEFT SHOULDER: ICD-10-CM

## 2020-08-04 DIAGNOSIS — M79.641 PAIN IN BOTH HANDS: ICD-10-CM

## 2020-08-04 DIAGNOSIS — M25.642 DECREASED RANGE OF MOTION OF LEFT THUMB: ICD-10-CM

## 2020-08-04 DIAGNOSIS — M62.81 MUSCLE WEAKNESS OF LEFT ARM: ICD-10-CM

## 2020-08-04 DIAGNOSIS — M79.642 PAIN IN BOTH HANDS: ICD-10-CM

## 2020-08-04 PROCEDURE — 97018 PARAFFIN BATH THERAPY: CPT | Mod: PO

## 2020-08-04 PROCEDURE — 97110 THERAPEUTIC EXERCISES: CPT | Mod: PO

## 2020-08-04 PROCEDURE — 97140 MANUAL THERAPY 1/> REGIONS: CPT | Mod: PO

## 2020-08-04 NOTE — PROGRESS NOTES
"  Occupational Therapy Daily Treatment Note     Date: 8/4/2020  Name: Annika Mac  Clinic Number: [id number]    Therapy Diagnosis:   Encounter Diagnoses   Name Primary?    Pain in both hands     Decreased range of motion of left thumb      Physician: Marky Hagen MD    Physician Orders: Eval & treat  Medical Diagnosis: B Hand Pain; CMC arthritis & De Quervain's  Surgical Procedure and Date: none  Evaluation Date: 07/09/20  Insurance Authorization Period Expiration: 12/31/20  Plan of Care Certification Period: 07/09/20 to 08/19/20  Date of Return to MD: TBD    Visit # / Visits authorized: 5 / 20    Time In: 10:30 am  Time Out:  11:25 am   Total Billable Time:  55 minutes (P, 1MT, 2TE)    Precautions:  Standard and Diabetes      Subjective     Pt reports: "I've been doing the exercises, but I noticed that I'm still having pain and swelling in my wrist/hands. I think I'm going to need the surgery"  Response to previous treatment:No adverse reactions  Functional change: able to use both wrist/hands in light self-care tasks    Pain: R=5/10;  L= 5-6/10 pre-tx  Location: bilateral wrists/hands      Objective     Annika received supervised modality for 10 minutes, as follows:  -Paraffin w/ MHP to B wrist/hands, pre-tx to decrease pain & increase tissue extensibility    Annika received the following manual therapy techniques for 10 minutes:   -Edema mgmt w/ lymphatic drainage technique;  Deep STM, including MFR, CFM, TPR & scar mgmt to B forearm/wrist/hands, using hand techniques and IASTM tools to increase blood flow/circulation, improve soft tissue pliability and decrease pain.    Annika received therapeutic exercises for 25 minutes direct care including:  B wrist/hand    Dexterciser  3 min   Isospheres 3 min   9 Peg  1 trial each hand   Ergo Gripper 2/10 reps, 2 red & 2 yellow bands       T-putty: Yellow/green   -Molding 2 min each   -grasp/manipulation (not today) 3 reps each   -dowel digs 3 min   -log rolls w/ tripod pinch " 1 logs       Home Exercises and Education Provided     Education provided:   - Reviewed HEP    Written Home Exercises Provided: Patient instructed to cont prior HEP.  Exercises were reviewed and Annika was able to demonstrate them prior to the end of the session.  Annika demonstrated good  understanding of the HEP provided.     See EMR under Patient Instructions for exercises provided prior visit.        Assessment     Patient continues to have fluctuations in pain and edema, however, she continues to have less myofascial tightness and fibrotic tissue in B forearm-hand muscles.  She was able to perform all listed therapeutic exercises without any increase in pain.    Annika is progressing well towards her goals and there are no updates to goals at this time. Pt prognosis is Good.     Pt will continue to benefit from skilled outpatient occupational therapy to address the deficits listed in the problem list on initial evaluation provide pt/family education and to maximize pt's level of independence in the home and community environment.     Anticipated barriers to occupational therapy: Arthritis, Diabetes    Pt's spiritual, cultural and educational needs considered and pt agreeable to plan of care and goals.    Goals:  Short Term Goals: (in 2 weeks)  1) Patient will be independent in HEP  2) Decrease pain in B hands/thumbs to no more than 6-7/10 worst in ADL/IADL's  3) Increase AROM in L thumb Rad/Palm ABD by 3-5 degrees for improved functioning in ADL/IADL's  4) Increase B  strength by 3-5 psi for increased functional use  5) Decrease edema in L wrist/thumb to trace      Long Term Goals: (in 6 weeks)  1) Decrease pain in B hands/thumbs to no more than 2-3/10 worst in ADL/IADL's  2) Increase AROM of B hands/thumbs to WFL for increased functioning in ADL/IADL's  3) Increase strength in B  by 20% of initial measures for improved functioning in ADL/IADL's  4) Increased functioning in ADL/IADL's, as evidenced by a FOTO  impairment rating of no more than 43%  5) Decrease edema in B wrist/hand to trace or none       Plan     Certification Period/Plan of care expiration: 7/9/2020 to 08/03/20.     Outpatient Occupational Therapy 2 times weekly for 6 weeks to include the following interventions: Paraffin, Fluidotherapy, Manual therapy/joint mobilizations, Modalities for pain management, US 3 mhz, Therapeutic exercises/activities., Strengthening, Orthotic Fabrication/Fit/Training, Edema Control, Electrical Modalities and Joint Protection.       Pratik Veloz, OT

## 2020-08-04 NOTE — PROGRESS NOTES
Face to Face PTA Conference performed with Irena Barron PTA regarding patient's current status, overall progress, and plan of care. Pt will be seen by a physical therapist minimally every 6th visit or every 30 days.    Face to Face PTA Conference performed with Marino López PT regarding patient's current status, overall progress, and plan of care. Pt will be seen by a physical therapist minimally every 6th visit or every 30 days.    Irena Barron PTA  8/4/2020

## 2020-08-04 NOTE — PROGRESS NOTES
"  Physical Therapy Daily Treatment Note     Name: Annika Mac  Clinic Number: [id number]    Therapy Diagnosis:   Encounter Diagnoses   Name Primary?    Decreased ROM of left shoulder     Muscle weakness of left arm      Physician: IGLESIA Garcia MD    Visit Date: 8/4/2020    Physician Orders: PT Eval and Treat   Medical Diagnosis from Referral: M25.512,G89.29 (ICD-10-CM) - Chronic left shoulder pain   Evaluation Date: 6/30/2020  Authorization Period Expiration: 06/17/2021   Plan of Care Expiration: 8/30/2020  Visit # / Visits authorized: 7/ 20     Time In: 11:20 am  Time Out: 12:00 pm  Total Appointment Time (timed & untimed codes): 38 minutes     Precautions: Standard and Fall      Subjective     Pt reports: she has continues left shoulder pain but improving strength and mobility   She was compliant with home exercise program.  Response to previous treatment: increased soreness  Functional change: improved AROM    Pain: 5/10  Location: left shoulder      Objective     Annika received therapeutic exercises to develop strength, ROM, flexibility and posture for 38 minutes 1:1 with PT including:     Date  8/4/2020 07/29/2020 7/27/2020 7/22/2020 7/9/2020 7/7/2020 6/30/2020   VISIT 7/20 6/20 5/20 4/20 3/20 2/20 1/1   POC EXP. DATE 8/30/2020 8/30/2020 8/30/2020 8/30/2020 8/30/2020 8/30/2020 8/30/2020   VISIT AMOUNT  MEDICARE TOTAL  90.96 90.96  416.40 60.64  325.44 60.64  264.80 90.96  204.16 113.20   FACE-TO-FACE 9/4/2020 7/30/2020 7/30/2020 7/30/2020 7/30/2020 7/30/2020 7/30/2020   FOTO -- -- 5/5 4/5 3/5 2/5 1/5               Pulley Flexion  -- Not today  2' 2' 2' 2' next   TABLE:           Table slide: flexion -- -- -- 2 x 10 Not today 1 x 15 1 x 10   Table slide: abduction -- -- -- 2 x 10 Not today 1 x 15 1 x 10   Table slide: ER -- -- -- 2 x 10 Not today 1 x 15 next   Scapular retractions -- 20 x 3" 20 x 3" 20 x 3" 15 x 3" 15 x 3" 10 x 3"   Posterior shoulder roll 2 x 10 2 x 10  2 x 10 2 x 10 1 x 15 1 x 15 next " "  Wand flexion 2 x 10 x 1#bar 2 x 10  2 x 10 2 x 10 1 x 15 1 x 15 next   Wand ABD 2 x 10 x 1# bar 2 x 10  2 x 10 2 x 10 1 x 15 1 x 15    Scapular protractions 2 x 10 x 1# 2 x 10  1 x 15       Reverse codman's 20 cw/ccw x 1# 20 cw/ccw 20 cw/ccw       Shoulder isometrics:  - Flexion  - Extension  - IR  - ER Active  2 x 10 RTB  2 x 10 RTB  2 x 10 RTB 2 x 10 RTB Active  1 x 15 RTB  1 x 15 RTB  1 x 15 RTB  1 x 15 RTB Active  1 x 15 RTB  1 x 15 RTB  1 x 15 RTB  1 x 15 RTB   10 x 3"  10 x 3"  10 x 3'  10 x 3'   10 x 3"  10 x 3"  10 x 3"  10 x 3"   10 x 3"  10 x 3"  10 x 3"  10 x 3"    1 x 10  1 x 10  1 x 10  1 x 10   Shldr AROM: seated  Flexion  scaption  abduction   2 x 10  2 x 10  2 x 10   1 x 15  1 x 15   1 x 15   1 x 10  1 x 10  1 x 10 Next?       Seated row 2 x 10 GTB                      STANDING:           Pec str -- -- -- 3 x 15" Next? Next?     IR stretch with strap 3 x 15" Resume  3 x 15" 3 x 15" 1 x 10 Next?                                                     Initials MA AFRICA ROJAS SB DG SB MA        Annika receives cold pack x 10 minutes to left shoulder to decrease pain and inflammation.     Home Exercises and Patient Education Provided     Education provided:   - HEP  - keeping exercises in a pain free range    Written Home Exercises Provided: yes.  Exercises were reviewed and Annika was able to demonstrate them prior to the end of the session.  Annika demonstrated fair  understanding of the education provided.      See EMR under Patient Instructions for exercises provided 6/30/2020.      Assessment     Annika continues to have limited tolerance to standing exercises due to bilateral knee pain.  She is progressed with supine exercises with addition of 1# db and 1# bar for exercises.  Pt requires tactile cues for proper form during scapular protractions and reverse Codman's.      Annika is progressing slowly towards her goals.   Pt prognosis is Good.     Pt will continue to benefit from skilled outpatient physical therapy " to address the deficits listed in the problem list box on initial evaluation, provide pt/family education and to maximize pt's level of independence in the home and community environment.     Pt's spiritual, cultural and educational needs considered and pt agreeable to plan of care and goals.    Anticipated Barriers for therapy: transportation, pain tolerance, apprehension       Goals:  Short Term Goals:  4 weeks  1. Patient will be compliant with HEP to promote the independent management of current diagnosis. In progress, not met  2. Patient will increase left shoulder flexion to 90 degrees for function of dressing. met  3. Patient will increase left shoulder functional ER to reach C2 for function of grooming. met  4. Patient will report a decrease in complaints of left shoulder pain to 6/10 during performance of ADL's for independence of self care activities.  In progress, not met        Long Term Goals:  8 weeks  1. Patient will increase left shoulder strength to 4-/5 to improve tolerance to light house work. In progress, not met  2. Patient will increase left shoulder flexion to 135 degrees in order to place dishes in overhead cabinets independently for self care independence.  In progress, not met  3. Patient will increase left shoulder functional IR to reach T7 for function of dressing. In progress, not met  4. Patient will increase scapular stabilization strength to 4/5 to improve tolerance to normal lifting. In progress, not met      Plan     Continue with planned PT POC as tolerated. Progress rows next visit    Marino López, PT, DPT

## 2020-08-06 ENCOUNTER — CLINICAL SUPPORT (OUTPATIENT)
Dept: REHABILITATION | Facility: HOSPITAL | Age: 70
End: 2020-08-06
Payer: MEDICARE

## 2020-08-06 DIAGNOSIS — M25.612 DECREASED ROM OF LEFT SHOULDER: ICD-10-CM

## 2020-08-06 DIAGNOSIS — M25.642 DECREASED RANGE OF MOTION OF LEFT THUMB: ICD-10-CM

## 2020-08-06 DIAGNOSIS — M62.81 MUSCLE WEAKNESS OF LEFT ARM: ICD-10-CM

## 2020-08-06 DIAGNOSIS — M79.641 PAIN IN BOTH HANDS: ICD-10-CM

## 2020-08-06 DIAGNOSIS — M79.642 PAIN IN BOTH HANDS: ICD-10-CM

## 2020-08-06 PROCEDURE — 97140 MANUAL THERAPY 1/> REGIONS: CPT | Mod: PO

## 2020-08-06 PROCEDURE — 97110 THERAPEUTIC EXERCISES: CPT | Mod: PO

## 2020-08-06 PROCEDURE — 97018 PARAFFIN BATH THERAPY: CPT | Mod: PO,59

## 2020-08-06 NOTE — PROGRESS NOTES
"  Physical Therapy Daily Treatment Note     Name: Annika Mac  Clinic Number: [id number]    Therapy Diagnosis:   Encounter Diagnoses   Name Primary?    Decreased ROM of left shoulder     Muscle weakness of left arm      Physician: IGLESIA Garcia MD    Visit Date: 8/6/2020    Physician Orders: PT Eval and Treat   Medical Diagnosis from Referral: M25.512,G89.29 (ICD-10-CM) - Chronic left shoulder pain   Evaluation Date: 6/30/2020  Authorization Period Expiration: 06/17/2021   Plan of Care Expiration: 8/30/2020  Visit # / Visits authorized: 7/ 20     Time In: 11:20 am  Time Out: 12:00 pm  Total Appointment Time (timed & untimed codes): 38 minutes     Precautions: Standard and Fall      Subjective     Pt reports: decreased pain and improved mobility during ADL's   She was compliant with home exercise program.  Response to previous treatment: no change in pain  Functional change: improved AROM    Pain: 4/10  Location: left shoulder      Objective     Annika received therapeutic exercises to develop strength, ROM, flexibility and posture for 38 minutes 1:1 with PT including:     Date  8/6/2020 8/4/2020 07/29/2020 7/27/2020 7/22/2020 7/9/2020 7/7/2020 6/30/2020   VISIT 8/20 7/20 6/20 5/20 4/20 3/20 2/20 1/1   POC EXP. DATE 8/30/2020 8/30/2020 8/30/2020 8/30/2020 8/30/2020 8/30/2020 8/30/2020 8/30/2020   VISIT AMOUNT  MEDICARE TOTAL 90.96  689.28   598.32 90.96 90.96  416.40 60.64  325.44 60.64  264.80 90.96  204.16 113.20   FACE-TO-FACE 9/4/2020 9/4/2020 7/30/2020 7/30/2020 7/30/2020 7/30/2020 7/30/2020 7/30/2020   FOTO  -- -- 5/5 4/5 3/5 2/5 1/5                Pulley Flexion  2' -- Not today  2' 2' 2' 2' next   TABLE:            Table slide: flexion DC -- -- -- 2 x 10 Not today 1 x 15 1 x 10   Table slide: abduction DC -- -- -- 2 x 10 Not today 1 x 15 1 x 10   Table slide: KYLE DC -- -- -- 2 x 10 Not today 1 x 15 next   Scapular retractions 20 x 3" -- 20 x 3" 20 x 3" 20 x 3" 15 x 3" 15 x 3" 10 x 3"   Posterior shoulder " "roll 2 x 10 2 x 10 2 x 10  2 x 10 2 x 10 1 x 15 1 x 15 next   Wand flexion 2 x 10 x 1# bar 2 x 10 x 1#bar 2 x 10  2 x 10 2 x 10 1 x 15 1 x 15 next   Wand ABD 2 x 10 x 1# bar 2 x 10 x 1# bar 2 x 10  2 x 10 2 x 10 1 x 15 1 x 15    Scapular protractions 2 x 10 x 1# 2 x 10 x 1# 2 x 10  1 x 15       Reverse codman's NT 20 cw/ccw x 1# 20 cw/ccw 20 cw/ccw       Shoulder isometrics:  - Flexion  - Extension  - IR  - ER Active  2 x 10 GTB  2 x 10 GTB  2 x 10 GTB  2 x 10 GTB Active  2 x 10 RTB  2 x 10 RTB  2 x 10 RTB 2 x 10 RTB Active  1 x 15 RTB  1 x 15 RTB  1 x 15 RTB  1 x 15 RTB Active  1 x 15 RTB  1 x 15 RTB  1 x 15 RTB  1 x 15 RTB   10 x 3"  10 x 3"  10 x 3'  10 x 3'   10 x 3"  10 x 3"  10 x 3"  10 x 3"   10 x 3"  10 x 3"  10 x 3"  10 x 3"    1 x 10  1 x 10  1 x 10  1 x 10   Shldr AROM: seated  Flexion  scaption  abduction   1 x 10 x 2#  1 x 10 x 1#  1 x 10 x 1#   2 x 10  2 x 10  2 x 10   1 x 15  1 x 15   1 x 15   1 x 10  1 x 10  1 x 10 Next?       Seated row 3 x 10 GTB 2 x 10 GTB                       STANDING:            Pec str -- -- -- -- 3 x 15" Next? Next?     IR stretch with strap 3 x 15" 3 x 15" Resume  3 x 15" 3 x 15" 1 x 10 Next?                                                         Initials CRYSTAL Barkerma receives cold pack x 10 minutes to left shoulder to decrease pain and inflammation.     Home Exercises and Patient Education Provided     Education provided:   - HEP  - keeping exercises in a pain free range    Written Home Exercises Provided: yes.  Exercises were reviewed and Annika was able to demonstrate them prior to the end of the session.  Annika demonstrated fair  understanding of the education provided.      See EMR under Patient Instructions for exercises provided 6/30/2020.      Assessment     Annika performs Nghia's exercises seated to decrease bilateral knee pain from standing.  She is able to progress to green band as well as addition of dumbbell during AROM exercises.  Pt " will continue with progression of shoulder strengthening to improve tolerance to lifting and performing normal house work.      Annika is progressing slowly towards her goals.   Pt prognosis is Good.     Pt will continue to benefit from skilled outpatient physical therapy to address the deficits listed in the problem list box on initial evaluation, provide pt/family education and to maximize pt's level of independence in the home and community environment.     Pt's spiritual, cultural and educational needs considered and pt agreeable to plan of care and goals.    Anticipated Barriers for therapy: transportation, pain tolerance, apprehension       Goals:  Short Term Goals:  4 weeks  1. Patient will be compliant with HEP to promote the independent management of current diagnosis. In progress, not met  2. Patient will increase left shoulder flexion to 90 degrees for function of dressing. met  3. Patient will increase left shoulder functional ER to reach C2 for function of grooming. met  4. Patient will report a decrease in complaints of left shoulder pain to 6/10 during performance of ADL's for independence of self care activities.  In progress, not met        Long Term Goals:  8 weeks  1. Patient will increase left shoulder strength to 4-/5 to improve tolerance to light house work. In progress, not met  2. Patient will increase left shoulder flexion to 135 degrees in order to place dishes in overhead cabinets independently for self care independence.  In progress, not met  3. Patient will increase left shoulder functional IR to reach T7 for function of dressing. In progress, not met  4. Patient will increase scapular stabilization strength to 4/5 to improve tolerance to normal lifting. In progress, not met      Plan     Continue with planned PT POC as tolerated. Progress rows to blue thera-band next visit.    Marino López, PT, DPT

## 2020-08-06 NOTE — PROGRESS NOTES
Occupational Therapy Daily Treatment Note     Date: 8/6/2020  Name: Annika Mac  [facility] Number: [id number]    Therapy Diagnosis:   Encounter Diagnoses   Name Primary?    Pain in both hands     Decreased range of motion of left thumb      Physician: Marky Hagen MD    Physician Orders: Eval & treat  Medical Diagnosis: B Hand Pain; CMC arthritis & De Quervain's  Surgical Procedure and Date: none  Evaluation Date: 07/09/20  Insurance Authorization Period Expiration: 12/31/20  Plan of Care Certification Period: 07/09/20 to 08/19/20  Date of Return to MD: TBD    Visit # / Visits authorized: 6 / 20    Time In: 10:30 am  Time Out:  11:15 am   Total Billable Time:  45 minutes (P, 1MT, 1TE)    Precautions:  Standard and Diabetes      Subjective     Pt reports: she feels condition has slightly improved, as she has a little less pain today  Response to previous treatment:No adverse reactions noted or reported  Functional change: able to use both wrist/hands in light self-care tasks    Pain: R=3/10;  L= 3-46/10 pre-tx  Location: bilateral wrists/hands      Objective     Annika received supervised modality for 10 minutes, as follows:  -Paraffin w/ MHP to B wrist/hands, pre-tx to decrease pain & increase tissue extensibility    Annika received the following manual therapy techniques for 10 minutes:   -Edema mgmt w/ lymphatic drainage technique;  Deep STM, including MFR, CFM, TPR & scar mgmt to B forearm/wrist/hands, using hand techniques and IASTM tools to increase blood flow/circulation, improve soft tissue pliability and decrease pain.    Annika received therapeutic exercises for 25 minutes direct care including:  B wrist/hand    Dexterciser  3 min   Isospheres 3 min   9 Peg  1 trial each hand   Ergo Gripper 3/10 reps, 2 red & 2 yellow bands       T-putty: Yellow/green   -grasp/manipulation (not today) 3 reps each   -dowel digs 2 min       Home Exercises and Education Provided     Education provided:   - Reviewed HEP  -Progress  towards goals    Written Home Exercises Provided: Patient instructed to cont prior HEP.  Exercises were reviewed and Annika was able to demonstrate them prior to the end of the session.  Annika demonstrated good  understanding of the HEP provided.     See EMR under Patient Instructions for exercises provided prior visit.        Assessment     Patient with less pain and edema today in B wrist/hand.  Myofascial tightness and fibrotic tissue is decreasing with each treatment in B forearm-hand muscles.  She was able to perform all listed therapeutic exercises without any increase in pain.    Annika is progressing well towards her goals and there are no updates to goals at this time. Pt prognosis is Good.     Pt will continue to benefit from skilled outpatient occupational therapy to address the deficits listed in the problem list on initial evaluation provide pt/family education and to maximize pt's level of independence in the home and community environment.     Anticipated barriers to occupational therapy: Arthritis, Diabetes    Pt's spiritual, cultural and educational needs considered and pt agreeable to plan of care and goals.    Goals:  Short Term Goals: (in 2 weeks)  1) Patient will be independent in HEP  2) Decrease pain in B hands/thumbs to no more than 6-7/10 worst in ADL/IADL's  3) Increase AROM in L thumb Rad/Palm ABD by 3-5 degrees for improved functioning in ADL/IADL's  4) Increase B  strength by 3-5 psi for increased functional use  5) Decrease edema in L wrist/thumb to trace      Long Term Goals: (in 6 weeks)  1) Decrease pain in B hands/thumbs to no more than 2-3/10 worst in ADL/IADL's  2) Increase AROM of B hands/thumbs to WFL for increased functioning in ADL/IADL's  3) Increase strength in B  by 20% of initial measures for improved functioning in ADL/IADL's  4) Increased functioning in ADL/IADL's, as evidenced by a FOTO impairment rating of no more than 43%  5) Decrease edema in B wrist/hand to trace  or none       Plan     Certification Period/Plan of care expiration: 7/9/2020 to 08/03/20.     Outpatient Occupational Therapy 2 times weekly for 6 weeks to include the following interventions: Paraffin, Fluidotherapy, Manual therapy/joint mobilizations, Modalities for pain management, US 3 mhz, Therapeutic exercises/activities., Strengthening, Orthotic Fabrication/Fit/Training, Edema Control, Electrical Modalities and Joint Protection.       Pratik Veloz, OT

## 2020-08-11 ENCOUNTER — CLINICAL SUPPORT (OUTPATIENT)
Dept: REHABILITATION | Facility: HOSPITAL | Age: 70
End: 2020-08-11
Payer: MEDICARE

## 2020-08-11 DIAGNOSIS — M25.642 DECREASED RANGE OF MOTION OF LEFT THUMB: ICD-10-CM

## 2020-08-11 DIAGNOSIS — M62.81 MUSCLE WEAKNESS OF LEFT ARM: ICD-10-CM

## 2020-08-11 DIAGNOSIS — M25.612 DECREASED ROM OF LEFT SHOULDER: ICD-10-CM

## 2020-08-11 DIAGNOSIS — M79.642 PAIN IN BOTH HANDS: ICD-10-CM

## 2020-08-11 DIAGNOSIS — M79.641 PAIN IN BOTH HANDS: ICD-10-CM

## 2020-08-11 PROCEDURE — 97110 THERAPEUTIC EXERCISES: CPT | Mod: PO

## 2020-08-11 PROCEDURE — 97018 PARAFFIN BATH THERAPY: CPT | Mod: PO,59

## 2020-08-11 PROCEDURE — 97140 MANUAL THERAPY 1/> REGIONS: CPT | Mod: PO

## 2020-08-11 NOTE — PROGRESS NOTES
"  Physical Therapy Daily Treatment Note     Name: Annika Mac  Clinic Number: [id number]    Therapy Diagnosis:   Encounter Diagnoses   Name Primary?    Decreased ROM of left shoulder     Muscle weakness of left arm      Physician: IGLESIA Garcia MD    Visit Date: 8/11/2020    Physician Orders: PT Eval and Treat   Medical Diagnosis from Referral: M25.512,G89.29 (ICD-10-CM) - Chronic left shoulder pain   Evaluation Date: 6/30/2020  Authorization Period Expiration: 06/17/2021   Plan of Care Expiration: 8/30/2020  Visit # / Visits authorized: 7/ 20     Time In: 11:15 am  Time Out: 12:00 pm  Total Appointment Time (timed & untimed codes): 43 minutes     Precautions: Standard and Fall      Subjective     Pt reports: hurting a little more in the left shoulder today   She was compliant with home exercise program.  Response to previous treatment: no change in pain  Functional change: improved AROM    Pain: 5-6/10  Location: left shoulder      Objective     Annika received therapeutic exercises to develop strength, ROM, flexibility and posture for 43 minutes 1:1 with PT including:     Date  8/11/2020 8/6/2020 8/4/2020 07/29/2020 7/27/2020 7/22/2020 7/9/2020 7/7/2020 6/30/2020   VISIT 9/20 8/20 7/20 6/20 5/20 4/20 3/20 2/20 1/1   POC EXP. DATE 8/30/2020 8/30/2020 8/30/2020 8/30/2020 8/30/2020 8/30/2020 8/30/2020 8/30/2020 8/30/2020   VISIT AMOUNT  MEDICARE TOTAL 90.96  780.24 90.96  689.28   598.32 90.96 90.96  416.40 60.64  325.44 60.64  264.80 90.96  204.16 113.20   FACE-TO-FACE 9/4/2020 9/4/2020 9/4/2020 7/30/2020 7/30/2020 7/30/2020 7/30/2020 7/30/2020 7/30/2020   FOTO   -- -- 5/5 4/5 3/5 2/5 1/5                 Pulley Flexion  2' 2' -- Not today  2' 2' 2' 2' next   TABLE:             Table slide: flexion -- DC -- -- -- 2 x 10 Not today 1 x 15 1 x 10   Table slide: abduction -- DC -- -- -- 2 x 10 Not today 1 x 15 1 x 10   Table slide: ER -- DC -- -- -- 2 x 10 Not today 1 x 15 next   Scapular retractions 20 x 3" 20 x 3" -- " "20 x 3" 20 x 3" 20 x 3" 15 x 3" 15 x 3" 10 x 3"   Posterior shoulder roll 2 x 10  2 x 10 2 x 10 2 x 10  2 x 10 2 x 10 1 x 15 1 x 15 next   Wand flexion 2 x 10 x 1# bar  2 x 10 x 1# bar 2 x 10 x 1#bar 2 x 10  2 x 10 2 x 10 1 x 15 1 x 15 next   Wand ABD 2 x 10 x 1# bar 2 x 10 x 1# bar 2 x 10 x 1# bar 2 x 10  2 x 10 2 x 10 1 x 15 1 x 15    Scapular protractions 2 x 10 x 1#  2 x 10 x 1# 2 x 10 x 1# 2 x 10  1 x 15       Reverse codman's  NT 20 cw/ccw x 1# 20 cw/ccw 20 cw/ccw       Shoulder isometrics:  - Flexion  - Extension  - IR  - ER Active   2 x 10 GTB  2 x 10 GTB  2 x 10 GTB  2 x 10 GTB Active  2 x 10 GTB  2 x 10 GTB  2 x 10 GTB  2 x 10 GTB Active  2 x 10 RTB  2 x 10 RTB  2 x 10 RTB 2 x 10 RTB Active  1 x 15 RTB  1 x 15 RTB  1 x 15 RTB  1 x 15 RTB Active  1 x 15 RTB  1 x 15 RTB  1 x 15 RTB  1 x 15 RTB   10 x 3"  10 x 3"  10 x 3'  10 x 3'   10 x 3"  10 x 3"  10 x 3"  10 x 3"   10 x 3"  10 x 3"  10 x 3"  10 x 3"    1 x 10  1 x 10  1 x 10  1 x 10   Shldr AROM: seated  Flexion  scaption  abduction   1 x 15 x 2#  1 x 15 x 1#  1 x 15 x 1#   1 x 10 x 2#  1 x 10 x 1#  1 x 10 x 1#   2 x 10  2 x 10  2 x 10   1 x 15  1 x 15   1 x 15   1 x 10  1 x 10  1 x 10 Next?       Seated row 3 x 10 GTB 3 x 10 GTB 2 x 10 GTB                        STANDING:             Pec str -- -- -- -- -- 3 x 15" Next? Next?     IR stretch with strap 3 x 15" 3 x 15" 3 x 15" Resume  3 x 15" 3 x 15" 1 x 10 Next?                                                             Initials BJ CRYSTAL THOMAS MA        Annika receives cold pack x 10 minutes to left shoulder to decrease pain and inflammation.     Home Exercises and Patient Education Provided     Education provided:   - HEP  - keeping exercises in a pain free range    Written Home Exercises Provided: yes.  Exercises were reviewed and Annika was able to demonstrate them prior to the end of the session.  Annika demonstrated fair  understanding of the education provided.      See EMR under Patient " Instructions for exercises provided 6/30/2020.      Assessment     Annika had minimal difficulty with today's exercises, but often requires correction to perform exercises with proper form and technique.     Annika is progressing slowly towards her goals.   Pt prognosis is Good.     Pt will continue to benefit from skilled outpatient physical therapy to address the deficits listed in the problem list box on initial evaluation, provide pt/family education and to maximize pt's level of independence in the home and community environment.     Pt's spiritual, cultural and educational needs considered and pt agreeable to plan of care and goals.    Anticipated Barriers for therapy: transportation, pain tolerance, apprehension       Goals:  Short Term Goals:  4 weeks  1. Patient will be compliant with HEP to promote the independent management of current diagnosis. In progress, not met  2. Patient will increase left shoulder flexion to 90 degrees for function of dressing. met  3. Patient will increase left shoulder functional ER to reach C2 for function of grooming. met  4. Patient will report a decrease in complaints of left shoulder pain to 6/10 during performance of ADL's for independence of self care activities.  In progress, not met        Long Term Goals:  8 weeks  1. Patient will increase left shoulder strength to 4-/5 to improve tolerance to light house work. In progress, not met  2. Patient will increase left shoulder flexion to 135 degrees in order to place dishes in overhead cabinets independently for self care independence.  In progress, not met  3. Patient will increase left shoulder functional IR to reach T7 for function of dressing. In progress, not met  4. Patient will increase scapular stabilization strength to 4/5 to improve tolerance to normal lifting. In progress, not met      Plan     Continue with planned PT POC as tolerated. Progress rows to blue thera-band next visit.    Muriel Cotton, PT, DPT

## 2020-08-11 NOTE — PLAN OF CARE
Outpatient Therapy Updated Plan of Care     Visit Date: 8/11/2020  Name: Annika Mac  Clinic Number: [id number]    Therapy Diagnosis:   Encounter Diagnoses   Name Primary?    Pain in both hands     Decreased range of motion of left thumb      Physician: Marky Hagen MD    Physician Orders: Eval & treat  Medical Diagnosis: B Hand Pain; CMC arthritis & De Quervain's  Surgical Procedure and Date: none  Evaluation Date: 07/09/20      Total Visits Received: 7  Cancelled Visits: 0  No Show Visits: 0    Current Certification Period:  08/03/20 to 08/31/20  Precautions:  Standard, DM  Visits from Evaluation Date:  7  Functional Level Prior to Evaluation:  Independent    Subjective     Update: Patient feels condition is gradually improving.     Pain:  Functional Pain Scale Rating 0-10:   5-6/10 on average for L; 3-4/10 average for R  5-6/10 at best for L; 3-4/10 best for R  6/10/10 at worst for L; 3/10 worst for R  Location: B wrist/thumb   Description: Aching, Throbbing, Tingling  Aggravating Factors: Lifting and gripping, pinching, or any use with both hands/thumbs, wringing towels   Easing Factors: massage, heating pad, rest and splinting    Objective     Update:   Observation/Appearance:  B wrist/thumb joint stiffness remains, but has improved since initial evaluation.  Minimal edema present.     Edema. Measured in centimeters.   6/19/2019 6/19/2019 08/11/20 08/11/20    Left Right Left Right   Wrist Crease 18.9 19.2 18.7 (-.2) 19.0 (-.2)   Mid palm 20.8 21.4 20.5 (-.3) 21.3 (-.1)   MCPs 20.0 19.5 19.9 (-.1) 19.5 (=)     Edema. Measured in centimeters.   6/19/2019 6/19/2019 08/11/20 08/11/20    Left Right Left Right   Thumb:       Prox. Phalanx 7.4 7.5 6.7 (-.7) 6.9 (-.6)   IP 7.1 7.4 7.3 (+.2) 7.2 (-.2)     AROM   06/19/20 06/19/20 08/11/20 08/11/20    Left Right Left Right   Wrist E/F 65* / 55* 60 / 65 * 67 (+2) / 60 (+5) 60 (=) / 60 (-5)   Wrist RD/UD 15* / 30* 15* / 45 17 (+2) / 40 (+10) 21 (+6) / 45 (=)   Thumb  R/P Abd 35* / 43* 50 / 50 45 (+10) / 45 (+2) 50 (=) / 55 (+5)   Thumb MP flex 30* 60 58 (+28) 60 (=)   Thumb IP flex 31* 53 60 (+29) 57 (+4)   Thumb JASSO's 61 113 118 (+57) 117 (+4)   Thumb [place] To SF DPC 3.0 cm fro SF DPC * To SF DPC To SF DPC   * pain with testing    Sensation: Numbness & tingling in L wrist to thumb tip has improved some, but remains a problem; intact for light touch & temperature, B'ly     Strength (Dyanmometer) and Pinch Strength (Pinch Gauge)  Measured in pounds and psi. Average of three trials.   6/19/2019 6/19/2019 08/11/20 08/11/20    Left Right Left Right   Rung II 10* 54 29* (+19) 63 (+9)   Key Pinch 6* 15* 10.5* (+4.5) 16 (+1)    3pt Pinch 1* 7* 4* (+3) 9* (+2)   2pt Pinch NT 9* 4* (+4) 7 (-2)   * pain with testing      Assessment     Update: Patient has made good progress towards al goals set at initial evaluation as patient is noted to have a decrease in pain and edema with increased ROM, strength ans coordination in B wrist/hands.      Goals:  Short Term Goals: (in 2 weeks)  1) Patient will be independent in HEP-met  2) Decrease pain in B hands/thumbs to no more than 6-7/10 worst in ADL/IADL's-met  3) Increase AROM in L thumb Rad/Palm ABD by 3-5 degrees for improved functioning in ADL/IADL's-met  4) Increase B  strength by 3-5 psi for increased functional use-met  5) Decrease edema in L wrist/thumb to trace-part met      Long Term Goals: (in 6 weeks)  1) Decrease pain in B hands/thumbs to no more than 2-3/10 worst in ADL/IADL's-ongoing  2) Increase AROM of B hands/thumbs to WFL for increased functioning in ADL/IADL's-ongoing  3) Increase strength in B  by 20% of initial measures for improved functioning in ADL/IADL's-ongoing  4) Increased functioning in ADL/IADL's, as evidenced by a FOTO impairment rating of no more than 43%-ongoing  5) Decrease edema in B wrist/hand to trace or none-ongoing     Previous Short Term Goals Status:   4/5 STG's met & 1/5 STG's part met  New  Short Term Goals Status:   Continue with unmet STG and progress to LTG's  Long Term Goal Status:   continue per initial plan of care.  Reasons for Recertification of Therapy:   Updated Plan of Care needed    Plan     Updated Certification Period:  08/03/20 to 08/31/20    Recommended Treatment Plan: 2 times per week for 4 weeks: Manual Therapy, Moist Heat/ Ice, Paraffin, Patient Education, Self Care, Therapeutic Activites, Therapeutic Exercise and Ultrasound       Other Recommendations: progress strengthening, as tolerated    Pratik Veloz OT  8/11/2020      I CERTIFY THE NEED FOR THESE SERVICES FURNISHED UNDER THIS PLAN OF TREATMENT AND WHILE UNDER MY CARE    Physician's comments:        Physician's Signature: ___________________________________________________

## 2020-08-11 NOTE — PROGRESS NOTES
Occupational Therapy Daily Treatment Note     Date: 8/11/2020  Name: Annika Mac  Clinic Number: [id number]    Therapy Diagnosis:   Encounter Diagnoses   Name Primary?    Pain in both hands     Decreased range of motion of left thumb      Physician: Marky Hagen MD    Physician Orders: Eval & treat  Medical Diagnosis: B Hand Pain; CMC arthritis & De Quervain's  Surgical Procedure and Date: none  Evaluation Date: 07/09/20  Insurance Authorization Period Expiration: 12/31/20  Plan of Care Certification Period: 07/09/20 to 08/19/20  Date of Return to MD: TBD    Visit # / Visits authorized: 7 / 20    Time In: 10:30 am  Time Out:  11:15 am   Total Billable Time:  45 minutes (P, 1MT, 1TE)    Precautions:  Standard and Diabetes      Subjective     Pt reports: she feels condition is slowly improving, but she continues to have pain.   Response to previous treatment:No adverse reactions noted or reported  Functional change: able to use both wrist/hands in light self-care tasks    Pain: R=3/10;  L= 3-46/10 pre-tx  Location: bilateral wrists/hands      Objective     Annika received supervised modality for 10 minutes, as follows:  -Paraffin w/ MHP to B wrist/hands, pre-tx to decrease pain & increase tissue extensibility    Annika received the following manual therapy techniques for 10 minutes:   -Edema mgmt w/ lymphatic drainage technique;  Deep STM, including MFR, CFM, TPR & scar mgmt to B forearm/wrist/hands, using hand techniques and IASTM tools to increase blood flow/circulation, improve soft tissue pliability and decrease pain.    Annika received therapeutic exercises for 25 minutes direct care including:    Objective measures taken today, see Updated Plan of Care for details    B wrist/hand    Dexterciser  3 min   Isospheres 3 min       T-putty: Yellow/green       Home Exercises and Education Provided     Education provided:   - Reviewed HEP  -Progress towards goals    Written Home Exercises Provided: Patient instructed to  cont prior HEP.  Exercises were reviewed and Annika was able to demonstrate them prior to the end of the session.  Annika demonstrated good  understanding of the HEP provided.     See EMR under Patient Instructions for exercises provided prior visit.        Assessment     See Updated Plan of Care for details on progress and status of goals    Annika is progressing well towards her goals and there are no updates to goals at this time. Pt prognosis is Good.     Pt will continue to benefit from skilled outpatient occupational therapy to address the deficits listed in the problem list on initial evaluation provide pt/family education and to maximize pt's level of independence in the home and community environment.     Anticipated barriers to occupational therapy: Arthritis, Diabetes    Pt's spiritual, cultural and educational needs considered and pt agreeable to plan of care and goals.    Plan     See Updated Plan of Care for details and recommendations for treatment plan.       Pratik Veloz, OT

## 2020-08-13 ENCOUNTER — CLINICAL SUPPORT (OUTPATIENT)
Dept: REHABILITATION | Facility: HOSPITAL | Age: 70
End: 2020-08-13
Payer: MEDICARE

## 2020-08-13 DIAGNOSIS — M62.81 MUSCLE WEAKNESS OF LEFT ARM: ICD-10-CM

## 2020-08-13 DIAGNOSIS — M79.642 PAIN IN BOTH HANDS: ICD-10-CM

## 2020-08-13 DIAGNOSIS — M25.642 DECREASED RANGE OF MOTION OF LEFT THUMB: ICD-10-CM

## 2020-08-13 DIAGNOSIS — M79.641 PAIN IN BOTH HANDS: ICD-10-CM

## 2020-08-13 DIAGNOSIS — M25.612 DECREASED ROM OF LEFT SHOULDER: ICD-10-CM

## 2020-08-13 PROCEDURE — 97110 THERAPEUTIC EXERCISES: CPT | Mod: PO

## 2020-08-13 PROCEDURE — 97140 MANUAL THERAPY 1/> REGIONS: CPT | Mod: PO

## 2020-08-13 PROCEDURE — 97018 PARAFFIN BATH THERAPY: CPT | Mod: PO

## 2020-08-13 NOTE — PROGRESS NOTES
Physical Therapy Daily Treatment Note     Name: Annika Mac  Clinic Number: [id number]    Therapy Diagnosis:   Encounter Diagnoses   Name Primary?    Decreased ROM of left shoulder     Muscle weakness of left arm      Physician: IGLESIA Garcia MD    Visit Date: 8/13/2020    Physician Orders: PT Eval and Treat   Medical Diagnosis from Referral: M25.512,G89.29 (ICD-10-CM) - Chronic left shoulder pain   Evaluation Date: 6/30/2020  Authorization Period Expiration: 06/17/2021   Plan of Care Expiration: 8/30/2020  Visit # / Visits authorized: 10/ 20     Time In: 11:15 am  Time Out: 12:57 pm  Total Appointment Time (timed & untimed codes): 40 minutes     Precautions: Standard and Fall      Subjective     Pt reports: hurting a little more in the left shoulder today   She was compliant with home exercise program.  Response to previous treatment: no change in pain  Functional change: improved AROM    Pain: 5/10 left shoulder 3/10 - right shoulder   Location: left shoulder      Objective     Annika received therapeutic exercises to develop strength, ROM, flexibility and posture for 40 minutes 1:1 with PT including:     Date  8/13/2020 8/11/2020 8/6/2020 8/4/2020 07/29/2020 7/27/2020 7/22/2020 7/9/2020 7/7/2020 6/30/2020   VISIT 10/20 9/20 8/20 7/20 6/20 5/20 4/20 3/20 2/20 1/1   POC EXP. DATE 8/30/2020 8/30/2020 8/30/2020 8/30/2020 8/30/2020 8/30/2020 8/30/2020 8/30/2020 8/30/2020 8/30/2020   VISIT AMOUNT  MEDICARE TOTAL 90.96  871.20 90.96  780.24 90.96  689.28   598.32 90.96 90.96  416.40 60.64  325.44 60.64  264.80 90.96  204.16 113.20   FACE-TO-FACE 9/4/2020 9/4/2020 9/4/2020 9/4/2020 7/30/2020 7/30/2020 7/30/2020 7/30/2020 7/30/2020 7/30/2020   FOTO    -- -- 5/5 4/5 3/5 2/5 1/5                  Pulley Flexion  2' 2' 2' -- Not today  2' 2' 2' 2' next   TABLE:              Table slide: flexion -- -- DC -- -- -- 2 x 10 Not today 1 x 15 1 x 10   Table slide: abduction -- -- DC -- -- -- 2 x 10 Not today 1 x 15 1 x 10  "  Table slide: ER -- -- DC -- -- -- 2 x 10 Not today 1 x 15 next   Scapular retractions 20 x 3" 20 x 3" 20 x 3" -- 20 x 3" 20 x 3" 20 x 3" 15 x 3" 15 x 3" 10 x 3"   Posterior shoulder roll 2 x 10  2 x 10  2 x 10 2 x 10 2 x 10  2 x 10 2 x 10 1 x 15 1 x 15 next   Wand flexion 3 x 10 x 1# bar 2 x 10 x 1# bar  2 x 10 x 1# bar 2 x 10 x 1#bar 2 x 10  2 x 10 2 x 10 1 x 15 1 x 15 next   Wand ABD 3 x 10 x 1# bar 2 x 10 x 1# bar 2 x 10 x 1# bar 2 x 10 x 1# bar 2 x 10  2 x 10 2 x 10 1 x 15 1 x 15    Scapular protractions 2 x 10 x 1# 2 x 10 x 1#  2 x 10 x 1# 2 x 10 x 1# 2 x 10  1 x 15       Reverse codman's 20 cw/ccw x 1# 20 cw/ccw x 1# NT 20 cw/ccw x 1# 20 cw/ccw 20 cw/ccw       Shoulder isometrics:  - Flexion  - Extension  - IR  - ER Active   --  3 x 10 GTB  3 x 10 GTB  3 x 10 GTB Active   2 x 10 GTB  2 x 10 GTB  2 x 10 GTB  2 x 10 GTB Active  2 x 10 GTB  2 x 10 GTB  2 x 10 GTB  2 x 10 GTB Active  2 x 10 RTB  2 x 10 RTB  2 x 10 RTB 2 x 10 RTB Active  1 x 15 RTB  1 x 15 RTB  1 x 15 RTB  1 x 15 RTB Active  1 x 15 RTB  1 x 15 RTB  1 x 15 RTB  1 x 15 RTB   10 x 3"  10 x 3"  10 x 3'  10 x 3'   10 x 3"  10 x 3"  10 x 3"  10 x 3"   10 x 3"  10 x 3"  10 x 3"  10 x 3"    1 x 10  1 x 10  1 x 10  1 x 10   Shldr AROM: seated  Flexion  scaption  abduction   1 x 15 x 2#  2 x 10 x 1#  2 x 10 x 1#   1 x 15 x 2#  1 x 15 x 1#  1 x 15 x 1#   1 x 10 x 2#  1 x 10 x 1#  1 x 10 x 1#   2 x 10  2 x 10  2 x 10   1 x 15  1 x 15   1 x 15   1 x 10  1 x 10  1 x 10 Next?       Seated row 3 x 10 GTB 3 x 10 GTB 3 x 10 GTB 2 x 10 GTB                         STANDING:              Pec str -- -- -- -- -- -- 3 x 15" Next? Next?     IR stretch with strap 3 x 15" 3 x 15" 3 x 15" 3 x 15" Resume  3 x 15" 3 x 15" 1 x 10 Next?                                                                 Initials BJ AFRICA Roblero receives cold pack x 10 minutes to left shoulder to decrease pain and inflammation.     Home Exercises and Patient Education " Provided     Education provided:   - HEP  - keeping exercises in a pain free range    Written Home Exercises Provided: yes.  Exercises were reviewed and Annika was able to demonstrate them prior to the end of the session.  Annika demonstrated fair  understanding of the education provided.      See EMR under Patient Instructions for exercises provided 6/30/2020.      Assessment     Annika had minimal difficulty with today's exercises, but often requires correction to perform exercises with proper form and technique.     Annika is progressing slowly towards her goals.   Pt prognosis is Good.     Pt will continue to benefit from skilled outpatient physical therapy to address the deficits listed in the problem list box on initial evaluation, provide pt/family education and to maximize pt's level of independence in the home and community environment.     Pt's spiritual, cultural and educational needs considered and pt agreeable to plan of care and goals.    Anticipated Barriers for therapy: transportation, pain tolerance, apprehension       Goals:  Short Term Goals:  4 weeks  1. Patient will be compliant with HEP to promote the independent management of current diagnosis. In progress, not met  2. Patient will increase left shoulder flexion to 90 degrees for function of dressing. met  3. Patient will increase left shoulder functional ER to reach C2 for function of grooming. met  4. Patient will report a decrease in complaints of left shoulder pain to 6/10 during performance of ADL's for independence of self care activities.  In progress, not met        Long Term Goals:  8 weeks  1. Patient will increase left shoulder strength to 4-/5 to improve tolerance to light house work. In progress, not met  2. Patient will increase left shoulder flexion to 135 degrees in order to place dishes in overhead cabinets independently for self care independence.  In progress, not met  3. Patient will increase left shoulder functional IR to reach T7  for function of dressing. In progress, not met  4. Patient will increase scapular stabilization strength to 4/5 to improve tolerance to normal lifting. In progress, not met      Plan     Continue with planned PT POC as tolerated. Progress rows to blue thera-band next visit.    Muriel Cotton, PT, DPT

## 2020-08-13 NOTE — PROGRESS NOTES
"  Occupational Therapy Daily Treatment Note     Date: 8/13/2020  Name: Annika Mac  Clinic Number: [id number]    Therapy Diagnosis:   Encounter Diagnoses   Name Primary?    Pain in both hands     Decreased range of motion of left thumb      Physician: Marky Hagen MD    Physician Orders: Eval & treat  Medical Diagnosis: B Hand Pain; CMC arthritis & De Quervain's  Surgical Procedure and Date: none  Evaluation Date: 07/09/20  Insurance Authorization Period Expiration: 12/31/20  Plan of Care Certification Period: 07/09/20 to 08/19/20  Date of Return to MD: TBD    Visit # / Visits authorized: 8 / 20    Time In: 10:35 am  Time Out:  11:15 am   Total Billable Time:  40 minutes (P, 1MT, 1TE)    Precautions:  Standard and Diabetes      Subjective     Pt reports: "I've been keeping the brace on the L wrist because it always hurts".   Response to previous treatment:No adverse reactions noted or reported  Functional change: able to use both wrist/hands in light self-care tasks    Pain: R=2-3/10;  L= 4-5/10 pre-tx  Location: bilateral wrists/hands      Objective     Annika received supervised modality for 10 minutes, as follows:  -Paraffin w/ MHP to B wrist/hands, pre-tx to decrease pain & increase tissue extensibility    Annika received the following manual therapy techniques for 10 minutes:   -Edema mgmt w/ lymphatic drainage technique;  Deep STM, including MFR, CFM, TPR & scar mgmt to B forearm/wrist/hands, using hand techniques and IASTM tools to increase blood flow/circulation, improve soft tissue pliability and decrease pain.    Annika received therapeutic exercises for 25 minutes direct care including:  B wrist/hand    Dexterciser  3 min   Isospheres 2 min   9 Peg  1 trial each hand   Ergo Gripper 3/10 reps, 2 red & 2 yellow bands       T-putty: Yellow/green   -Molding 2 min each   -grasp/manipulation (not today) 3 reps each   -dowel digs 2 min   -log rolls w/ tripod pinch 1 logs       Home Exercises and Education Provided "     Education provided:   - Reviewed HEP  -Progress towards goals    Written Home Exercises Provided: Patient instructed to cont prior HEP.  Exercises were reviewed and Annika was able to demonstrate them prior to the end of the session.  Annika demonstrated good  understanding of the HEP provided.     See EMR under Patient Instructions for exercises provided prior visit.        Assessment     Pain level remains about the same as last treatment. Patient is able to complete all above listed therapeutic exercises without any increase in pain or difficulty.  Recommend to continue OT to progress exercises, as tolerated.     Annika is progressing well towards her goals and there are no updates to goals at this time. Pt prognosis is Good.     Pt will continue to benefit from skilled outpatient occupational therapy to address the deficits listed in the problem list on initial evaluation provide pt/family education and to maximize pt's level of independence in the home and community environment.     Anticipated barriers to occupational therapy: Arthritis, Diabetes    Pt's spiritual, cultural and educational needs considered and pt agreeable to plan of care and goals.    Plan     Updated Certification Period:  08/03/20 to 08/31/20     Recommended Treatment Plan: 2 times per week for 4 weeks: Manual Therapy, Moist Heat/ Ice, Paraffin, Patient Education, Self Care, Therapeutic Activites, Therapeutic Exercise and Ultrasound         Other Recommendations: progress strengthening, as tolerated       Pratik Veloz, OT

## 2020-08-18 ENCOUNTER — CLINICAL SUPPORT (OUTPATIENT)
Dept: REHABILITATION | Facility: HOSPITAL | Age: 70
End: 2020-08-18
Payer: MEDICARE

## 2020-08-18 DIAGNOSIS — M25.642 DECREASED RANGE OF MOTION OF LEFT THUMB: ICD-10-CM

## 2020-08-18 DIAGNOSIS — M25.612 DECREASED ROM OF LEFT SHOULDER: ICD-10-CM

## 2020-08-18 DIAGNOSIS — M62.81 MUSCLE WEAKNESS OF LEFT ARM: ICD-10-CM

## 2020-08-18 DIAGNOSIS — M79.642 PAIN IN BOTH HANDS: ICD-10-CM

## 2020-08-18 DIAGNOSIS — M79.641 PAIN IN BOTH HANDS: ICD-10-CM

## 2020-08-18 PROCEDURE — 97110 THERAPEUTIC EXERCISES: CPT | Mod: PO

## 2020-08-18 PROCEDURE — 97018 PARAFFIN BATH THERAPY: CPT | Mod: PO

## 2020-08-18 PROCEDURE — 97140 MANUAL THERAPY 1/> REGIONS: CPT | Mod: PO

## 2020-08-18 NOTE — PROGRESS NOTES
"  Occupational Therapy Daily Treatment Note     Date: 8/18/2020  Name: Annika Mac  Clinic Number: [id number]    Therapy Diagnosis:   Encounter Diagnoses   Name Primary?    Pain in both hands     Decreased range of motion of left thumb      Physician: Marky Hagen MD    Physician Orders: Eval & treat  Medical Diagnosis: B Hand Pain; CMC arthritis & De Quervain's  Surgical Procedure and Date: none  Evaluation Date: 07/09/20  Insurance Authorization Period Expiration: 12/31/20  Plan of Care Certification Period: 07/09/20 to 08/19/20  Date of Return to MD: TBD    Visit # / Visits authorized: 9 / 20    Time In: 10:30 am  Time Out:  11:15 am   Total Billable Time:  45 minutes (P, 1MT, 1TE)    Precautions:  Standard and Diabetes      Subjective     Pt reports: "Over the weekend the pain was bad. I had a lot of dishes to do on Sunday".   Response to previous treatment: No adverse reactions noted or reported  Functional change: able to use both wrist/hands in light self-care tasks, but with pain    Pain: R=3/10;  L= 5/10 pre-tx  Location: bilateral wrists/hands      Objective     Annika received supervised modality for 10 minutes, as follows:  -Paraffin w/ MHP to B wrist/hands, pre-tx to decrease pain & increase tissue extensibility    Annika received the following manual therapy techniques for 10 minutes:   -Edema mgmt w/ lymphatic drainage technique;  Deep STM, including MFR, CFM, TPR & scar mgmt to B forearm/wrist/hands, using hand techniques and IASTM tools to increase blood flow/circulation, improve soft tissue pliability and decrease pain.    Annika received therapeutic exercises for 25 minutes direct care including:  B wrist/hand    Dexterciser  3 min   Isospheres 2 min   9 Peg  1 trial each hand   Ergo Gripper 2/10 reps, 2 red & 2 yellow bands       T-putty: Yellow/green   -Molding 2 min each   -log rolls w/ tripod pinch 1 logs       Home Exercises and Education Provided     Education provided:   - Reviewed " HEP  -Progress towards goals    Written Home Exercises Provided: Patient instructed to cont prior HEP.  Exercises were reviewed and Annika was able to demonstrate them prior to the end of the session.  Annika demonstrated good  understanding of the HEP provided.     See EMR under Patient Instructions for exercises provided prior visit.        Assessment     Pain level continues to be about the same over the past 3 treatment session, however, patient reports she has increased pain anytime she uses her hands in certain activities, I.e. washing dishes, stirring food while cooking and mopping.  Patient was able to complete all above listed therapeutic exercises without any increase in pain or difficulty today.  Recommend to continue OT to progress exercises, as tolerated.     Annika is progressing well towards her goals and there are no updates to goals at this time. Pt prognosis is Good.     Pt will continue to benefit from skilled outpatient occupational therapy to address the deficits listed in the problem list on initial evaluation provide pt/family education and to maximize pt's level of independence in the home and community environment.     Anticipated barriers to occupational therapy: Arthritis, Diabetes    Pt's spiritual, cultural and educational needs considered and pt agreeable to plan of care and goals.    Plan     Updated Certification Period:  08/03/20 to 08/31/20     Recommended Treatment Plan: 2 times per week for 4 weeks: Manual Therapy, Moist Heat/ Ice, Paraffin, Patient Education, Self Care, Therapeutic Activites, Therapeutic Exercise and Ultrasound         Other Recommendations: progress strengthening, as tolerated       Pratik Veloz, OT

## 2020-08-18 NOTE — PROGRESS NOTES
"  Physical Therapy Daily Treatment Note     Name: Annika Mac  Clinic Number: [id number]    Therapy Diagnosis:   Encounter Diagnoses   Name Primary?    Decreased ROM of left shoulder     Muscle weakness of left arm      Physician: IGLESIA Garcia MD    Visit Date: 8/18/2020    Physician Orders: PT Eval and Treat   Medical Diagnosis from Referral: M25.512,G89.29 (ICD-10-CM) - Chronic left shoulder pain   Evaluation Date: 6/30/2020  Authorization Period Expiration: 06/17/2021   Plan of Care Expiration: 8/30/2020  Visit # / Visits authorized: 11/ 20     Time In: 11:15 am  Time Out: 12:00 pm  Total Appointment Time (timed & untimed codes): 35 minutes     Precautions: Standard and Fall      Subjective     Pt reports: having pain in bilateral shoulders with right > left on this date.   She was compliant with home exercise program.  Response to previous treatment: no change in pain  Functional change: improved AROM    Pain: 4/10   Location: left shoulder      Objective     Annika received therapeutic exercises to develop strength, ROM, flexibility and posture for 35 minutes 1:1 with PT including:     Date  8/18/2020 8/13/2020 8/11/2020 8/6/2020 8/4/2020 07/29/2020 7/27/2020 7/22/2020 7/9/2020 7/7/2020 6/30/2020   VISIT 11/20 10/20 9/20 8/20 7/20 6/20 5/20 4/20 3/20 2/20 1/1   POC EXP. DATE 8/30/2020 8/30/2020 8/30/2020 8/30/2020 8/30/2020 8/30/2020 8/30/2020 8/30/2020 8/30/2020 8/30/2020 8/30/2020   VISIT AMOUNT  MEDICARE TOTAL 60.64  931.84 90.96  871.20 90.96  780.24 90.96  689.28   598.32 90.96 90.96  416.40 60.64  325.44 60.64  264.80 90.96  204.16 113.20   FACE-TO-FACE 9/4/2020 9/4/2020 9/4/2020 9/4/2020 9/4/2020 7/30/2020 7/30/2020 7/30/2020 7/30/2020 7/30/2020 7/30/2020   FOTO     -- -- 5/5 4/5 3/5 2/5 1/5                   Pulley Flexion  2' 2' 2' 2' -- Not today  2' 2' 2' 2' next   TABLE:               Scapular retractions 20 x 3" 20 x 3" 20 x 3" 20 x 3" -- 20 x 3" 20 x 3" 20 x 3" 15 x 3" 15 x 3" 10 x 3" " "  Posterior shoulder roll 2 x 10 2 x 10  2 x 10  2 x 10 2 x 10 2 x 10  2 x 10 2 x 10 1 x 15 1 x 15 next   Wand flexion Seated  3 x 10 x 1# 3 x 10 x 1# bar 2 x 10 x 1# bar  2 x 10 x 1# bar 2 x 10 x 1#bar 2 x 10  2 x 10 2 x 10 1 x 15 1 x 15 next   Wand ABD Seated  3 x 10 x 1# 3 x 10 x 1# bar 2 x 10 x 1# bar 2 x 10 x 1# bar 2 x 10 x 1# bar 2 x 10  2 x 10 2 x 10 1 x 15 1 x 15    Scapular protractions NT 2 x 10 x 1# 2 x 10 x 1#  2 x 10 x 1# 2 x 10 x 1# 2 x 10  1 x 15       Reverse codman's NT 20 cw/ccw x 1# 20 cw/ccw x 1# NT 20 cw/ccw x 1# 20 cw/ccw 20 cw/ccw       Shoulder isometrics:  - Flexion  - Extension  - IR  - ER Active   3 x 10 GTB  3 x 10 GTB  3 x 10 GTB  3 x 10 GTB Active   --  3 x 10 GTB  3 x 10 GTB  3 x 10 GTB Active   2 x 10 GTB  2 x 10 GTB  2 x 10 GTB  2 x 10 GTB Active  2 x 10 GTB  2 x 10 GTB  2 x 10 GTB  2 x 10 GTB Active  2 x 10 RTB  2 x 10 RTB  2 x 10 RTB 2 x 10 RTB Active  1 x 15 RTB  1 x 15 RTB  1 x 15 RTB  1 x 15 RTB Active  1 x 15 RTB  1 x 15 RTB  1 x 15 RTB  1 x 15 RTB   10 x 3"  10 x 3"  10 x 3'  10 x 3'   10 x 3"  10 x 3"  10 x 3"  10 x 3"   10 x 3"  10 x 3"  10 x 3"  10 x 3"    1 x 10  1 x 10  1 x 10  1 x 10   Shldr AROM: seated  Flexion  scaption  abduction   2 x 10 x 2#  2 x 10 x 2#  2 x 10 x 2#   1 x 15 x 2#  2 x 10 x 1#  2 x 10 x 1#   1 x 15 x 2#  1 x 15 x 1#  1 x 15 x 1#   1 x 10 x 2#  1 x 10 x 1#  1 x 10 x 1#   2 x 10  2 x 10  2 x 10   1 x 15  1 x 15   1 x 15   1 x 10  1 x 10  1 x 10 Next?       Seated row 3 x 10 BTB 3 x 10 GTB 3 x 10 GTB 3 x 10 GTB 2 x 10 GTB                          STANDING:               Pec str -- -- -- -- -- -- -- 3 x 15" Next? Next?     IR stretch with strap 3 x 15" 3 x 15" 3 x 15" 3 x 15" 3 x 15" Resume  3 x 15" 3 x 15" 1 x 10 Next?                                                                     Initials CRYSTAL Roblero receives moist hot pack x 10 minutes to bilateral shoulders to decrease pain and muscle guarding.     Home " Exercises and Patient Education Provided     Education provided:   - HEP  - keeping exercises in a pain free range    Written Home Exercises Provided: yes.  Exercises were reviewed and Annika was able to demonstrate them prior to the end of the session.  Annika demonstrated fair  understanding of the education provided.      See EMR under Patient Instructions for exercises provided 6/30/2020.      Assessment     Annika continues to have daily shoulder pain, but is able to progress with shoulder strengthening exercises.  She requires verbal cues for proper form during resisted IR and ER to avoid abduction and elbow extension.  Pt will continue with current plan of care to improve functional mobility and strength of bilateral shoulders.      Annika is progressing slowly towards her goals.   Pt prognosis is Good.     Pt will continue to benefit from skilled outpatient physical therapy to address the deficits listed in the problem list box on initial evaluation, provide pt/family education and to maximize pt's level of independence in the home and community environment.     Pt's spiritual, cultural and educational needs considered and pt agreeable to plan of care and goals.    Anticipated Barriers for therapy: transportation, pain tolerance, apprehension       Goals:  Short Term Goals:  4 weeks  1. Patient will be compliant with HEP to promote the independent management of current diagnosis. In progress, not met  2. Patient will increase left shoulder flexion to 90 degrees for function of dressing. met  3. Patient will increase left shoulder functional ER to reach C2 for function of grooming. met  4. Patient will report a decrease in complaints of left shoulder pain to 6/10 during performance of ADL's for independence of self care activities.  In progress, not met        Long Term Goals:  8 weeks  1. Patient will increase left shoulder strength to 4-/5 to improve tolerance to light house work. In progress, not met  2. Patient  will increase left shoulder flexion to 135 degrees in order to place dishes in overhead cabinets independently for self care independence.  In progress, not met  3. Patient will increase left shoulder functional IR to reach T7 for function of dressing. In progress, not met  4. Patient will increase scapular stabilization strength to 4/5 to improve tolerance to normal lifting. In progress, not met      Plan     Continue with planned PT POC as tolerated. Resume supine exercises.    Marino López, PT, DPT

## 2020-08-19 ENCOUNTER — OFFICE VISIT (OUTPATIENT)
Dept: INTERNAL MEDICINE | Facility: CLINIC | Age: 70
End: 2020-08-19
Payer: MEDICARE

## 2020-08-19 ENCOUNTER — OFFICE VISIT (OUTPATIENT)
Dept: HEMATOLOGY/ONCOLOGY | Facility: CLINIC | Age: 70
End: 2020-08-19
Payer: MEDICARE

## 2020-08-19 ENCOUNTER — LAB VISIT (OUTPATIENT)
Dept: LAB | Facility: HOSPITAL | Age: 70
End: 2020-08-19
Attending: INTERNAL MEDICINE
Payer: MEDICARE

## 2020-08-19 VITALS
HEART RATE: 67 BPM | SYSTOLIC BLOOD PRESSURE: 128 MMHG | DIASTOLIC BLOOD PRESSURE: 66 MMHG | WEIGHT: 293 LBS | BODY MASS INDEX: 48.82 KG/M2 | HEIGHT: 65 IN | OXYGEN SATURATION: 99 %

## 2020-08-19 VITALS
BODY MASS INDEX: 48.82 KG/M2 | HEIGHT: 65 IN | OXYGEN SATURATION: 96 % | RESPIRATION RATE: 16 BRPM | TEMPERATURE: 98 F | SYSTOLIC BLOOD PRESSURE: 151 MMHG | HEART RATE: 77 BPM | DIASTOLIC BLOOD PRESSURE: 64 MMHG | WEIGHT: 293 LBS

## 2020-08-19 DIAGNOSIS — E66.9 TYPE 2 DIABETES MELLITUS WITH OBESITY: ICD-10-CM

## 2020-08-19 DIAGNOSIS — N18.4 TYPE 2 DM WITH CKD STAGE 4 AND HYPERTENSION: ICD-10-CM

## 2020-08-19 DIAGNOSIS — G89.29 CHRONIC BACK PAIN, UNSPECIFIED BACK LOCATION, UNSPECIFIED BACK PAIN LATERALITY: ICD-10-CM

## 2020-08-19 DIAGNOSIS — E66.01 MORBID OBESITY WITH BMI OF 50.0-59.9, ADULT: ICD-10-CM

## 2020-08-19 DIAGNOSIS — E66.9 OBESITY, DIABETES, AND HYPERTENSION SYNDROME: ICD-10-CM

## 2020-08-19 DIAGNOSIS — D64.9 ANEMIA, UNSPECIFIED TYPE: ICD-10-CM

## 2020-08-19 DIAGNOSIS — I15.2 OBESITY, DIABETES, AND HYPERTENSION SYNDROME: ICD-10-CM

## 2020-08-19 DIAGNOSIS — E11.51 TYPE 2 DIABETES MELLITUS WITH PERIPHERAL ARTERY DISEASE: ICD-10-CM

## 2020-08-19 DIAGNOSIS — E11.22 TYPE 2 DM WITH CKD STAGE 4 AND HYPERTENSION: ICD-10-CM

## 2020-08-19 DIAGNOSIS — D63.1 ANEMIA OF CHRONIC RENAL FAILURE, STAGE 3 (MODERATE): Primary | ICD-10-CM

## 2020-08-19 DIAGNOSIS — I12.9 TYPE 2 DM WITH CKD STAGE 4 AND HYPERTENSION: ICD-10-CM

## 2020-08-19 DIAGNOSIS — M54.9 CHRONIC BACK PAIN, UNSPECIFIED BACK LOCATION, UNSPECIFIED BACK PAIN LATERALITY: ICD-10-CM

## 2020-08-19 DIAGNOSIS — E11.69 TYPE 2 DIABETES MELLITUS WITH OBESITY: ICD-10-CM

## 2020-08-19 DIAGNOSIS — M47.22 OSTEOARTHRITIS OF SPINE WITH RADICULOPATHY, CERVICAL REGION: ICD-10-CM

## 2020-08-19 DIAGNOSIS — I51.89 DIASTOLIC DYSFUNCTION: Chronic | ICD-10-CM

## 2020-08-19 DIAGNOSIS — N25.81 SECONDARY HYPERPARATHYROIDISM: ICD-10-CM

## 2020-08-19 DIAGNOSIS — G47.33 OBSTRUCTIVE SLEEP APNEA: Chronic | ICD-10-CM

## 2020-08-19 DIAGNOSIS — N18.9 ANEMIA IN CHRONIC KIDNEY DISEASE, UNSPECIFIED CKD STAGE: ICD-10-CM

## 2020-08-19 DIAGNOSIS — E11.649 HYPOGLYCEMIA UNAWARENESS ASSOCIATED WITH TYPE 2 DIABETES MELLITUS: ICD-10-CM

## 2020-08-19 DIAGNOSIS — E11.59 OBESITY, DIABETES, AND HYPERTENSION SYNDROME: ICD-10-CM

## 2020-08-19 DIAGNOSIS — Z79.4 TYPE 2 DIABETES MELLITUS WITH DIABETIC POLYNEUROPATHY, WITH LONG-TERM CURRENT USE OF INSULIN: ICD-10-CM

## 2020-08-19 DIAGNOSIS — N18.30 ANEMIA IN STAGE 3 CHRONIC KIDNEY DISEASE: ICD-10-CM

## 2020-08-19 DIAGNOSIS — D63.1 ANEMIA IN CHRONIC KIDNEY DISEASE, UNSPECIFIED CKD STAGE: ICD-10-CM

## 2020-08-19 DIAGNOSIS — M46.96 UNSPECIFIED INFLAMMATORY SPONDYLOPATHY, LUMBAR REGION: ICD-10-CM

## 2020-08-19 DIAGNOSIS — I10 ESSENTIAL HYPERTENSION: ICD-10-CM

## 2020-08-19 DIAGNOSIS — E66.01 MORBID OBESITY DUE TO EXCESS CALORIES: ICD-10-CM

## 2020-08-19 DIAGNOSIS — E11.43 DIABETIC AUTONOMIC NEUROPATHY ASSOCIATED WITH TYPE 2 DIABETES MELLITUS: ICD-10-CM

## 2020-08-19 DIAGNOSIS — N28.89 LEFT RENAL MASS: ICD-10-CM

## 2020-08-19 DIAGNOSIS — D50.9 IRON DEFICIENCY ANEMIA, UNSPECIFIED IRON DEFICIENCY ANEMIA TYPE: ICD-10-CM

## 2020-08-19 DIAGNOSIS — N18.4 STAGE 4 CHRONIC KIDNEY DISEASE: ICD-10-CM

## 2020-08-19 DIAGNOSIS — E11.42 TYPE 2 DIABETES MELLITUS WITH DIABETIC POLYNEUROPATHY, WITH LONG-TERM CURRENT USE OF INSULIN: ICD-10-CM

## 2020-08-19 DIAGNOSIS — G95.9 CERVICAL MYELOPATHY: ICD-10-CM

## 2020-08-19 DIAGNOSIS — E78.5 DYSLIPIDEMIA: Chronic | ICD-10-CM

## 2020-08-19 DIAGNOSIS — D63.1 ANEMIA IN STAGE 3 CHRONIC KIDNEY DISEASE: ICD-10-CM

## 2020-08-19 DIAGNOSIS — M48.061 SPINAL STENOSIS OF LUMBAR REGION, UNSPECIFIED WHETHER NEUROGENIC CLAUDICATION PRESENT: ICD-10-CM

## 2020-08-19 DIAGNOSIS — Z99.89 DEPENDENCE ON OTHER ENABLING MACHINES AND DEVICES: ICD-10-CM

## 2020-08-19 DIAGNOSIS — E11.69 OBESITY, DIABETES, AND HYPERTENSION SYNDROME: ICD-10-CM

## 2020-08-19 DIAGNOSIS — N18.30 ANEMIA OF CHRONIC RENAL FAILURE, STAGE 3 (MODERATE): Primary | ICD-10-CM

## 2020-08-19 DIAGNOSIS — Z86.73 HISTORY OF STROKE: ICD-10-CM

## 2020-08-19 DIAGNOSIS — Z00.00 ENCOUNTER FOR PREVENTIVE HEALTH EXAMINATION: Primary | ICD-10-CM

## 2020-08-19 DIAGNOSIS — I77.819 ECTATIC AORTA: ICD-10-CM

## 2020-08-19 DIAGNOSIS — I73.9 PAD (PERIPHERAL ARTERY DISEASE): Chronic | ICD-10-CM

## 2020-08-19 DIAGNOSIS — M1A.00X0 IDIOPATHIC CHRONIC GOUT WITHOUT TOPHUS, UNSPECIFIED SITE: ICD-10-CM

## 2020-08-19 DIAGNOSIS — M47.816 LUMBAR FACET ARTHROPATHY: ICD-10-CM

## 2020-08-19 DIAGNOSIS — D64.9 ANEMIA, UNSPECIFIED TYPE: Primary | ICD-10-CM

## 2020-08-19 PROBLEM — E21.3 HYPERPARATHYROIDISM: Status: ACTIVE | Noted: 2018-03-07

## 2020-08-19 PROBLEM — E66.813 CLASS 3 SEVERE OBESITY IN ADULT: Status: RESOLVED | Noted: 2019-05-24 | Resolved: 2020-08-19

## 2020-08-19 LAB
ALBUMIN SERPL BCP-MCNC: 3.3 G/DL (ref 3.5–5.2)
ALP SERPL-CCNC: 108 U/L (ref 55–135)
ALT SERPL W/O P-5'-P-CCNC: 17 U/L (ref 10–44)
ANION GAP SERPL CALC-SCNC: 5 MMOL/L (ref 8–16)
AST SERPL-CCNC: 21 U/L (ref 10–40)
BILIRUB SERPL-MCNC: 0.3 MG/DL (ref 0.1–1)
BUN SERPL-MCNC: 26 MG/DL (ref 8–23)
CALCIUM SERPL-MCNC: 9.2 MG/DL (ref 8.7–10.5)
CHLORIDE SERPL-SCNC: 109 MMOL/L (ref 95–110)
CO2 SERPL-SCNC: 26 MMOL/L (ref 23–29)
CREAT SERPL-MCNC: 1.8 MG/DL (ref 0.5–1.4)
DAT IGG-SP REAG RBC-IMP: NORMAL
ERYTHROCYTE [DISTWIDTH] IN BLOOD BY AUTOMATED COUNT: 16.4 % (ref 11.5–14.5)
EST. GFR  (AFRICAN AMERICAN): 32.4 ML/MIN/1.73 M^2
EST. GFR  (NON AFRICAN AMERICAN): 28.1 ML/MIN/1.73 M^2
FERRITIN SERPL-MCNC: 63 NG/ML (ref 20–300)
GLUCOSE SERPL-MCNC: 213 MG/DL (ref 70–110)
HCT VFR BLD AUTO: 29 % (ref 37–48.5)
HGB BLD-MCNC: 8.9 G/DL (ref 12–16)
IMM GRANULOCYTES # BLD AUTO: 0.02 K/UL (ref 0–0.04)
LDH SERPL L TO P-CCNC: 231 U/L (ref 110–260)
MCH RBC QN AUTO: 28.6 PG (ref 27–31)
MCHC RBC AUTO-ENTMCNC: 30.7 G/DL (ref 32–36)
MCV RBC AUTO: 93 FL (ref 82–98)
NEUTROPHILS # BLD AUTO: 2.7 K/UL (ref 1.8–7.7)
PLATELET # BLD AUTO: 221 K/UL (ref 150–350)
PMV BLD AUTO: 10.7 FL (ref 9.2–12.9)
POTASSIUM SERPL-SCNC: 4.7 MMOL/L (ref 3.5–5.1)
PROT SERPL-MCNC: 6 G/DL (ref 6–8.4)
RBC # BLD AUTO: 3.11 M/UL (ref 4–5.4)
SODIUM SERPL-SCNC: 140 MMOL/L (ref 136–145)
WBC # BLD AUTO: 5.06 K/UL (ref 3.9–12.7)

## 2020-08-19 PROCEDURE — 99999 PR PBB SHADOW E&M-EST. PATIENT-LVL V: CPT | Mod: PBBFAC,,, | Performed by: NURSE PRACTITIONER

## 2020-08-19 PROCEDURE — 99214 PR OFFICE/OUTPT VISIT, EST, LEVL IV, 30-39 MIN: ICD-10-PCS | Mod: S$PBB,,, | Performed by: INTERNAL MEDICINE

## 2020-08-19 PROCEDURE — 99215 OFFICE O/P EST HI 40 MIN: CPT | Mod: PBBFAC | Performed by: INTERNAL MEDICINE

## 2020-08-19 PROCEDURE — 99215 OFFICE O/P EST HI 40 MIN: CPT | Mod: PBBFAC,27 | Performed by: NURSE PRACTITIONER

## 2020-08-19 PROCEDURE — 85027 COMPLETE CBC AUTOMATED: CPT

## 2020-08-19 PROCEDURE — 99214 OFFICE O/P EST MOD 30 MIN: CPT | Mod: S$PBB,,, | Performed by: INTERNAL MEDICINE

## 2020-08-19 PROCEDURE — 86880 COOMBS TEST DIRECT: CPT

## 2020-08-19 PROCEDURE — 99999 PR PBB SHADOW E&M-EST. PATIENT-LVL V: CPT | Mod: PBBFAC,,, | Performed by: INTERNAL MEDICINE

## 2020-08-19 PROCEDURE — 83615 LACTATE (LD) (LDH) ENZYME: CPT

## 2020-08-19 PROCEDURE — 80053 COMPREHEN METABOLIC PANEL: CPT

## 2020-08-19 PROCEDURE — 99999 PR PBB SHADOW E&M-EST. PATIENT-LVL V: ICD-10-PCS | Mod: PBBFAC,,, | Performed by: NURSE PRACTITIONER

## 2020-08-19 PROCEDURE — G0439 PR MEDICARE ANNUAL WELLNESS SUBSEQUENT VISIT: ICD-10-PCS | Mod: S$GLB,,, | Performed by: NURSE PRACTITIONER

## 2020-08-19 PROCEDURE — 36415 COLL VENOUS BLD VENIPUNCTURE: CPT

## 2020-08-19 PROCEDURE — 99999 PR PBB SHADOW E&M-EST. PATIENT-LVL V: ICD-10-PCS | Mod: PBBFAC,,, | Performed by: INTERNAL MEDICINE

## 2020-08-19 PROCEDURE — G0439 PPPS, SUBSEQ VISIT: HCPCS | Mod: S$GLB,,, | Performed by: NURSE PRACTITIONER

## 2020-08-19 PROCEDURE — 82728 ASSAY OF FERRITIN: CPT

## 2020-08-19 NOTE — PATIENT INSTRUCTIONS
Counseling and Referral of Other Preventative  (Italic type indicates deductible and co-insurance are waived)    Patient Name: Annika Mac  Today's Date: 8/19/2020    Health Maintenance       Date Due Completion Date    Shingles Vaccine (1 of 2) 12/08/1999 Obtain new shingles vaccine - SHINGRIX - when available    Influenza Vaccine (1) 09/01/2020 10/22/2019    Override on 10/1/2018: Done (done at [facility])    Hemoglobin A1c 10/02/2020 7/2/2020    Mammogram 10/30/2020 10/30/2019    Lipid Panel 11/13/2020 11/13/2019    Eye Exam 12/20/2020 12/20/2019    High Dose Statin 07/28/2021 7/28/2020    Foot Exam 07/28/2021 7/28/2020 (Done)    Override on 7/28/2020: Done    Override on 6/13/2019: Done ([facility])    Override on 5/18/2018: Done    Override on 7/11/2017: Done    DEXA SCAN 06/09/2022 6/9/2017    Colorectal Cancer Screening 02/26/2023 2/26/2018    TETANUS VACCINE 08/24/2028 8/24/2018    Override on 9/30/2005: Done        No orders of the defined types were placed in this encounter.    The following information is provided to all patients.  This information is to help you find resources for any of the problems found today that may be affecting your health:                Living healthy guide: www.[facility].[place].gov      Understanding Diabetes: www.diabetes.org      Eating healthy: www.cdc.gov/healthyweight      CDC home safety checklist: www.cdc.gov/steadi/patient.html      Agency on Aging: www.goea.[place].[facility]      Alcoholics anonymous (AA): www.aa.org      Physical Activity: www.eric.nih.gov/ib8aflf      Tobacco use: www.quitwithusla.org

## 2020-08-19 NOTE — PROGRESS NOTES
Subjective:       Patient ID: Annika Mac is a 69 y.o. female.    Chief Complaint: No chief complaint on file.    Anemia       Mrs. Mac is a 70 yo morbidly obese female who presents today in follow up for her longstanding anemia.      Multiple stool samples in the past have been negative for occult blood.  Today she submitted four more stool samples that were all negative.  Of note, she had an EGD, colonoscopy, and video capsule swallow 2 years ago.  A tubular adenoma was removed from her colon while her EGD had shown patchy mildly erythematous mucosa without bleeding in the gastric body.  Biopsies were negative for malignancy.  The video capsule swallow was unremarkable although it was not an optimal study.    His CBC today shows a white count of 5,000 per cubic mm, hemoglobin 8.9 grams/deciliter, hematocrit 29.0%, MCV 92 and platelets are 221,000 per cubic mm. LDH is 231 U/L.  His ferritin is 62 ng/mL, creatinine is 1.8 mg/dL with a BUN of 26.  Of note, last month her creatinine peaked at 2.1 mg/dL.  Direct Seng test is negative.    Review of Systems  Overall she feels fair. She has longstanding arthritic pains which are no worse. She also mentions her longstanding constipation.  She states that when she strains she occasionally experiences hemorrhoidal bleeding.  She denies any anxiety, depression, easy bruising, fevers, chills, night sweats, weight loss, nausea, vomiting, diarrhea, diplopia, blurred vision, epistaxis, hematuria, or headaches.      Objective:      Physical Exam  GENERAL: She is alert, oriented to time, place, person, pleasant, well nourished, in no acute physical distress. Presents on a wheelchair.   VITAL SIGNS: Reviewed.   HEENT: Normal. There are no nasal, oral, lip, gingival, auricular, lid, conjunctival lesions. Pupils are equal, reactive to light and   accommodation and extraocular muscle movements are intact. Mucosae are moist and pink, and there is no thrush.   NECK: Supple without  JVD, adenopathy, or thyromegaly.   LUNGS: Clear to auscultation without wheezing, rales, or rhonchi.   CARDIOVASCULAR: Reveals an S1, S2, no murmurs, no rubs, no gallops.   ABDOMEN: Obese, soft, nontender, without organomegaly. Bowel sounds are present. A median abdominal scar is seen extending from the umbilicus to the symphysis pubis.   EXTREMITIES: No cyanosis or clubbing. There is 1(+) edema at the ankle level bilaterally.   SKIN: Does not have petechiae, rashes, induration, or ecchymoses.   NEUROLOGIC: Motor function is 5/5, DTRs are 0 to 1+ bilaterally, symmetrical, and cranial nerves within normal limits.   LYMPHATIC: There is no cervical, axillary, or supraclavicular adenopathy.       Assessment:       1. Anemia in stage 3 chronic kidney disease      2. Morbid obesity due to excess calories     3. 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           Plan:     I had a long discussion with Mrs. Mac.  I suspect her anemia is multifactorial but primarily secondary to her underlying renal dysfunction.  I recommended that she consider a bone marrow biopsy.  She will think about it and let me know at the time of her next visit, which will be 2 months from now.  I see no reason to recommend a repeat colonoscopy or a repeat EGD at this point Her multiple questions were answered to her satisfaction

## 2020-08-19 NOTE — PROGRESS NOTES
"  Annika Mac presented for a  Medicare AWV and comprehensive Health Risk Assessment today. The following components were reviewed and updated:    · Medical history  · Family History  · Social history  · Allergies and Current Medications  · Health Risk Assessment  · Health Maintenance  · Care Team     ** See Completed Assessments for Annual Wellness Visit within the encounter summary.**         The following assessments were completed:  · Living Situation  · CAGE  · Depression Screening  · Timed Get Up and Go  · Whisper Test  · Cognitive Function Screening      ·   · Nutrition Screening  · ADL Screening  · PAQ Screening        Vitals:    08/19/20 1435   BP: 128/66   Pulse: 67   SpO2: 99%   Weight: (!) 146.6 kg (323 lb 3.1 oz)   Height: 5' 5" (1.651 m)     Body mass index is 53.78 kg/m².  Physical Exam  Vitals signs and nursing note reviewed.   Constitutional:       Appearance: She is well-developed. She is obese.   HENT:      Head: Normocephalic.   Cardiovascular:      Rate and Rhythm: Normal rate and regular rhythm.   Pulmonary:      Effort: Pulmonary effort is normal.      Breath sounds: Normal breath sounds.   Abdominal:      General: Bowel sounds are normal.      Palpations: Abdomen is soft.   Musculoskeletal:      Comments: In wheelchair, gait antalgic   Skin:     General: Skin is warm and dry.   Neurological:      Mental Status: She is alert and oriented to person, place, and time.      Motor: No abnormal muscle tone.               Diagnoses and health risks identified today and associated recommendations/orders:    1. Encounter for preventive health examination  Here for Health Risk Assessment/Annual Wellness Visit.  Health maintenance reviewed and updated. Follow up in one year.  Prescription given for Shingrix.    2. Essential hypertension  Chronic, stable on current medications. Followed by PCP.    3. Ectatic aorta  Chronic, stable on current medications. Noted CXR 11/01/16. Followed by PCP.    4. Diastolic " dysfunction  Chronic, stable on current medications. Followed by PCP.    5. PAD (peripheral artery disease)  Chronic, stable on current medications. Followed by PCP.    6. History of stroke  Stable on current medications. Followed by PCP.    7. Dyslipidemia  Chronic, stable on current medications. Followed by PCP.    8. 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 Last A1c 6.7. Followed by PCP, Endocrinology.    9. Stage 4 chronic kidney disease  Chronic, stable on current medications. Followed by PCP.    10. Secondary hyperparathyroidism  Chronic, stable on current medications. Followed by PCP.    11. Type 2 diabetes mellitus with diabetic polyneuropathy, with long-term current use of insulin  Chronic, stable on current medications. Followed by PCP.    12. Diabetic autonomic neuropathy associated with type 2 diabetes mellitus  Chronic, stable on current medications. Followed by PCP.    13. Type 2 diabetes mellitus with obesity  Chronic, stable on current medications. Followed by PCP.    14. Morbid obesity with BMI of 50.0-59.9, adult  Chronic, stable. Followed by PCP.    15. Obesity, diabetes, and hypertension syndrome  Chronic, stable on current medications. Followed by PCP.    16. 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 Followed by Optometry.    17. Hypoglycemia unawareness associated with type 2 diabetes mellitus  Chronic, stable on current medications. Followed by PCP.    18. Left renal mass  Chronic, stable. Followed by PCP.    19. Iron deficiency anemia, unspecified iron deficiency anemia type  Chronic, stable. Followed by PCP, Hematology.    20. Anemia in chronic kidney disease, unspecified CKD stage  Chronic, stable. Followed by PCP, Hematology.    21. Obstructive sleep apnea  Chronic, no CPAP use. Followed by  PCP.    22. Idiopathic chronic gout without tophus, unspecified site  Chronic, stable on current medications. Followed by PCP,  Physical Medicine.    23. Chronic back pain, unspecified back location, unspecified back pain laterality  Chronic, stable on current medications. Followed by PCP, Physical Medicine.    24. Lumbar facet arthropathy  Chronic, stable on current medications. Followed by PCP, Physical Medicine.    25. Unspecified inflammatory spondylopathy, lumbar region  Chronic, stable on current medications. Followed by PCP, Physical Medicine.    26. Spinal stenosis of lumbar region, unspecified whether neurogenic claudication present  Chronic, stable on current medications. Followed by PCP, Physical Medicine.    27. Osteoarthritis of spine with radiculopathy, cervical region  Chronic, stable on current medications. Followed by PCP, Physical Medicine.    28. Dependence on other enabling machines and devices  Chronic, reporting 3 falls last year, one with left wrist injury. Uses cane/walker for ambulation. Followed by PCP, Physical Medicine.    29. Cervical Myelopathy  Chronic, stable on current medications. Followed by PCP, Physical Medicine.    30. Type 2 diabetes mellitus with peripheral artery disease  Chronic, stable on current medications. Last A1c 6.7. Followed by PCP, Endocrinology.    Provided Annika with a 5-10 year written screening schedule and personal prevention plan. Recommendations were developed using the USPSTF age appropriate recommendations. Education, counseling, and referrals were provided as needed. After Visit Summary printed and given to patient which includes a list of additional screenings\tests needed.    Follow up in 4 weeks (on 9/16/2020).with PCP    Rebeka Enriquez NP

## 2020-08-25 ENCOUNTER — CLINICAL SUPPORT (OUTPATIENT)
Dept: REHABILITATION | Facility: HOSPITAL | Age: 70
End: 2020-08-25
Payer: MEDICARE

## 2020-08-25 DIAGNOSIS — M79.641 PAIN IN BOTH HANDS: ICD-10-CM

## 2020-08-25 DIAGNOSIS — M79.642 PAIN IN BOTH HANDS: ICD-10-CM

## 2020-08-25 DIAGNOSIS — M25.612 DECREASED ROM OF LEFT SHOULDER: ICD-10-CM

## 2020-08-25 DIAGNOSIS — M25.642 DECREASED RANGE OF MOTION OF LEFT THUMB: ICD-10-CM

## 2020-08-25 DIAGNOSIS — M62.81 MUSCLE WEAKNESS OF LEFT ARM: ICD-10-CM

## 2020-08-25 PROCEDURE — 97110 THERAPEUTIC EXERCISES: CPT | Mod: PO

## 2020-08-25 PROCEDURE — 97018 PARAFFIN BATH THERAPY: CPT | Mod: PO

## 2020-08-25 PROCEDURE — 97140 MANUAL THERAPY 1/> REGIONS: CPT | Mod: PO

## 2020-08-25 NOTE — PROGRESS NOTES
"  Occupational Therapy Daily Treatment Note     Date: 8/25/2020  Name: Annika Mac  Clinic Number: [id number]    Therapy Diagnosis:   Encounter Diagnoses   Name Primary?    Pain in both hands     Decreased range of motion of left thumb      Physician: Marky Hagen MD    Physician Orders: Eval & treat  Medical Diagnosis: B Hand Pain; CMC arthritis & De Quervain's  Surgical Procedure and Date: none  Evaluation Date: 07/09/20  Insurance Authorization Period Expiration: 12/31/20  Plan of Care Certification Period: 07/09/20 to 08/19/20  Date of Return to MD: TBD    Visit # / Visits authorized: 10 / 20 Re-assess and Update Plan of Care next visit    Time In: 11:20 am  Time Out:  12:00 pm   Total Billable Time:  40 minutes (P, 1MT, 1TE)    Precautions:  Standard and Diabetes      Subjective     Pt reports: "I did some washing and cleaning over the weekend. That may be why there's more pain today".   Response to previous treatment: No adverse reactions noted or reported  Functional change: able to use both wrist/hands in light self-care tasks, but with pain    Pain: R=3-4/10;  L= 56/10 pre-tx   Location: bilateral wrists/hands      Objective     Annika received supervised modality after being cleared for contraindications, for 10 minutes, as follows:  -Paraffin w/ MHP to B wrist/hands, pre-tx to decrease pain & increase tissue extensibility    Annika received the following manual therapy techniques for 10 minutes:   -Edema mgmt w/ lymphatic drainage technique;  Deep STM, including MFR, CFM, TPR & scar mgmt to B forearm/wrist/hands, using hand techniques and IASTM tools to increase blood flow/circulation, improve soft tissue pliability and decrease pain.    Annika received therapeutic exercises for 20 minutes direct care including:  B wrist/hand    Dexterciser  3 min   Isospheres 3 min   9 Peg  1 trial each hand   Ergo Gripper 2/10 reps, 1 red & 2 yellow bands       T-putty: Yellow/green   -Molding 2 min each   -log rolls w/ " tripod pinch 1 logs       Home Exercises and Education Provided     Education provided:   - Reviewed HEP  -Progress towards goals    Written Home Exercises Provided: Patient instructed to cont prior HEP.  Exercises were reviewed and Annika was able to demonstrate them prior to the end of the session.  Annika demonstrated good  understanding of the HEP provided.     See EMR under Patient Instructions for exercises provided prior visit.        Assessment     Patient's pain level remains about the same, however, when she engages in more resistive activities (I.e. house work/cleaning) she experiences a slight increase in pain.  Patient was able to complete all above listed therapeutic exercises without any increase in pain or difficulty today.  Recommend to continue OT to progress exercises, as tolerated.     Annika is progressing well towards her goals and there are no updates to goals at this time. Pt prognosis is Good.     Pt will continue to benefit from skilled outpatient occupational therapy to address the deficits listed in the problem list on initial evaluation provide pt/family education and to maximize pt's level of independence in the home and community environment.     Anticipated barriers to occupational therapy: Arthritis, Diabetes    Pt's spiritual, cultural and educational needs considered and pt agreeable to plan of care and goals.    Plan     Updated Certification Period:  08/03/20 to 08/31/20     Recommended Treatment Plan: 2 times per week for 4 weeks: Manual Therapy, Moist Heat/ Ice, Paraffin, Patient Education, Self Care, Therapeutic Activites, Therapeutic Exercise and Ultrasound         Other Recommendations: progress strengthening, as tolerated       Pratik Veloz, OT

## 2020-08-25 NOTE — PROGRESS NOTES
"  Physical Therapy Daily Treatment Note     Name: Annika Mac  Clinic Number: [id number]    Therapy Diagnosis:   Encounter Diagnoses   Name Primary?    Decreased ROM of left shoulder     Muscle weakness of left arm      Physician: IGLESIA Garcia MD    Visit Date: 8/25/2020    Physician Orders: PT Eval and Treat   Medical Diagnosis from Referral: M25.512,G89.29 (ICD-10-CM) - Chronic left shoulder pain   Evaluation Date: 6/30/2020  Authorization Period Expiration: 06/17/2021   Plan of Care Expiration: 8/30/2020  Visit # / Visits authorized: 12/ 20     Time In: 12:00 pm  Time Out: 12:45 pm  Total Appointment Time (timed & untimed codes): 30 minutes     Precautions: Standard and Fall      Subjective     Pt reports: stiffness in bilateral shoulders with right more limited than left on this date.   She was compliant with home exercise program.  Response to previous treatment: slight decrease in pain  Functional change: improved AROM    Pain: 4/10   Location: left shoulder      Objective     Annika received therapeutic exercises to develop strength, ROM, flexibility and posture for 30 minutes 1:1 with PT including:     Date  8/25/2020 8/18/2020 8/13/2020 8/11/2020 8/6/2020 8/4/2020 07/29/2020 7/27/2020 7/22/2020 7/9/2020 7/7/2020 6/30/2020   VISIT 12/20 11/20 10/20 9/20 8/20 7/20 6/20 5/20 4/20 3/20 2/20 1/1   POC EXP. DATE 8/30/2020 8/30/2020 8/30/2020 8/30/2020 8/30/2020 8/30/2020 8/30/2020 8/30/2020 8/30/2020 8/30/2020 8/30/2020 8/30/2020   VISIT AMOUNT  MEDICARE TOTAL 60.64  992.48 60.64  931.84 90.96  871.20 90.96  780.24 90.96  689.28   598.32 90.96 90.96  416.40 60.64  325.44 60.64  264.80 90.96  204.16 113.20   FACE-TO-FACE 9/4/2020 9/4/2020 9/4/2020 9/4/2020 9/4/2020 9/4/2020/4/2020 7/30/2020 7/30/2020 7/30/2020 7/30/2020 7/30/2020 7/30/2020   FOTO      -- -- 5/5 4/5 3/5 2/5 1/5                    Pulley Flexion  2' 2' 2' 2' 2' -- Not today  2' 2' 2' 2' next   TABLE:                Scapular retractions 30 x 3" 20 x 3" " "20 x 3" 20 x 3" 20 x 3" -- 20 x 3" 20 x 3" 20 x 3" 15 x 3" 15 x 3" 10 x 3"   Posterior shoulder roll 3 x 10 2 x 10 2 x 10  2 x 10  2 x 10 2 x 10 2 x 10  2 x 10 2 x 10 1 x 15 1 x 15 next   Wand flexion Seated  3 x 10 x 2# Seated  3 x 10 x 1# 3 x 10 x 1# bar 2 x 10 x 1# bar  2 x 10 x 1# bar 2 x 10 x 1#bar 2 x 10  2 x 10 2 x 10 1 x 15 1 x 15 next   Wand ABD Seated  3 x 10 x 2# Seated  3 x 10 x 1# 3 x 10 x 1# bar 2 x 10 x 1# bar 2 x 10 x 1# bar 2 x 10 x 1# bar 2 x 10  2 x 10 2 x 10 1 x 15 1 x 15    Scapular protractions NT NT 2 x 10 x 1# 2 x 10 x 1#  2 x 10 x 1# 2 x 10 x 1# 2 x 10  1 x 15       Reverse codman's NT NT 20 cw/ccw x 1# 20 cw/ccw x 1# NT 20 cw/ccw x 1# 20 cw/ccw 20 cw/ccw       Shoulder isometrics:  - Flexion  - Extension  - IR  - ER Active   2 x 10 BTB  2 x 10 BTB  2 x 10 BTB  2 x 10 BTB Active   3 x 10 GTB  3 x 10 GTB  3 x 10 GTB  3 x 10 GTB Active   --  3 x 10 GTB  3 x 10 GTB  3 x 10 GTB Active   2 x 10 GTB  2 x 10 GTB  2 x 10 GTB  2 x 10 GTB Active  2 x 10 GTB  2 x 10 GTB  2 x 10 GTB  2 x 10 GTB Active  2 x 10 RTB  2 x 10 RTB  2 x 10 RTB 2 x 10 RTB Active  1 x 15 RTB  1 x 15 RTB  1 x 15 RTB  1 x 15 RTB Active  1 x 15 RTB  1 x 15 RTB  1 x 15 RTB  1 x 15 RTB   10 x 3"  10 x 3"  10 x 3'  10 x 3'   10 x 3"  10 x 3"  10 x 3"  10 x 3"   10 x 3"  10 x 3"  10 x 3"  10 x 3"    1 x 10  1 x 10  1 x 10  1 x 10   Shldr AROM: seated  Flexion  scaption  abduction   2 x 10 x 2#  2 x 10 x 2#  2 x 10 x 2#   2 x 10 x 2#  2 x 10 x 2#  2 x 10 x 2#   1 x 15 x 2#  2 x 10 x 1#  2 x 10 x 1#   1 x 15 x 2#  1 x 15 x 1#  1 x 15 x 1#   1 x 10 x 2#  1 x 10 x 1#  1 x 10 x 1#   2 x 10  2 x 10  2 x 10   1 x 15  1 x 15   1 x 15   1 x 10  1 x 10  1 x 10 Next?       Seated row 3 x 10 BTB 3 x 10 BTB 3 x 10 GTB 3 x 10 GTB 3 x 10 GTB 2 x 10 GTB                           STANDING:                Pec str  -- -- -- -- -- -- -- 3 x 15" Next? Next?     IR stretch with strap 3 x 15" 3 x 15" 3 x 15" 3 x 15" 3 x 15" 3 x 15" Resume  3 x 15" 3 x 15" 1 " x 10 Next?                                                                         Initials MA MA BJ BJ MA MA BJ  MA SB DG SB MA        Annika receives moist hot pack x 10 minutes to bilateral shoulders to decrease pain and muscle guarding.     Home Exercises and Patient Education Provided     Education provided:   - HEP  - keeping exercises in a pain free range    Written Home Exercises Provided: yes.  Exercises were reviewed and Annika was able to demonstrate them prior to the end of the session.  Annika demonstrated fair  understanding of the education provided.      See EMR under Patient Instructions for exercises provided 6/30/2020.      Assessment     Annika is progressing with shoulder strength with progression to blue theraband and 2# bar.  She continues to have fatigue during seated aROM with 2# db and requires verbal cues for rest breaks.  Pt will continue with current plan of care to improve ROM and strength of bilateral shoulders during ADL's.      Annika is progressing slowly towards her goals.   Pt prognosis is Good.     Pt will continue to benefit from skilled outpatient physical therapy to address the deficits listed in the problem list box on initial evaluation, provide pt/family education and to maximize pt's level of independence in the home and community environment.     Pt's spiritual, cultural and educational needs considered and pt agreeable to plan of care and goals.    Anticipated Barriers for therapy: transportation, pain tolerance, apprehension       Goals:  Short Term Goals:  4 weeks  1. Patient will be compliant with HEP to promote the independent management of current diagnosis. In progress, not met  2. Patient will increase left shoulder flexion to 90 degrees for function of dressing. met  3. Patient will increase left shoulder functional ER to reach C2 for function of grooming. met  4. Patient will report a decrease in complaints of left shoulder pain to 6/10 during performance of ADL's for  independence of self care activities.  In progress, not met        Long Term Goals:  8 weeks  1. Patient will increase left shoulder strength to 4-/5 to improve tolerance to light house work. In progress, not met  2. Patient will increase left shoulder flexion to 135 degrees in order to place dishes in overhead cabinets independently for self care independence.  In progress, not met  3. Patient will increase left shoulder functional IR to reach T7 for function of dressing. In progress, not met  4. Patient will increase scapular stabilization strength to 4/5 to improve tolerance to normal lifting. In progress, not met      Plan     Continue with planned PT POC as tolerated. Resume supine exercises next visit.    Marino López, PT, DPT

## 2020-08-27 ENCOUNTER — CLINICAL SUPPORT (OUTPATIENT)
Dept: REHABILITATION | Facility: HOSPITAL | Age: 70
End: 2020-08-27
Payer: MEDICARE

## 2020-08-27 DIAGNOSIS — M79.641 PAIN IN BOTH HANDS: ICD-10-CM

## 2020-08-27 DIAGNOSIS — M25.642 DECREASED RANGE OF MOTION OF LEFT THUMB: ICD-10-CM

## 2020-08-27 DIAGNOSIS — M79.642 PAIN IN BOTH HANDS: ICD-10-CM

## 2020-08-27 DIAGNOSIS — M25.612 DECREASED ROM OF LEFT SHOULDER: ICD-10-CM

## 2020-08-27 DIAGNOSIS — M62.81 MUSCLE WEAKNESS OF LEFT ARM: ICD-10-CM

## 2020-08-27 PROCEDURE — 97110 THERAPEUTIC EXERCISES: CPT | Mod: PO

## 2020-08-27 PROCEDURE — 97140 MANUAL THERAPY 1/> REGIONS: CPT | Mod: PO

## 2020-08-27 PROCEDURE — 97018 PARAFFIN BATH THERAPY: CPT | Mod: PO

## 2020-08-27 NOTE — PROGRESS NOTES
Physical Therapy Daily Treatment Note     Name: Annika Mac  Clinic Number: [id number]    Therapy Diagnosis:   Encounter Diagnoses   Name Primary?    Decreased ROM of left shoulder     Muscle weakness of left arm      Physician: IGLESIA Garcia MD    Visit Date: 8/27/2020    Physician Orders: PT Eval and Treat   Medical Diagnosis from Referral: M25.512,G89.29 (ICD-10-CM) - Chronic left shoulder pain   Evaluation Date: 6/30/2020  Authorization Period Expiration: 06/17/2021   Plan of Care Expiration: 8/30/2020  Visit # / Visits authorized: 13/ 20     Time In: 10:30 am  Time Out: 11:15 am  Total Appointment Time (timed & untimed codes): 30 minutes     Precautions: Standard and Fall      Subjective     Pt reports:  Having increased pain in all joints due to weather today.  Her left shoulder is more stiff and sore as compared to right.  Pt is reporting improved functional mobility with dressing, grooming, but continues to have pain with overhead activities and heavy house work.  She was compliant with home exercise program.  Response to previous treatment: slight decrease in pain  Functional change: improved AROM    Pain: 4/10   Location: left shoulder      Objective     Annika received therapeutic exercises to develop strength, ROM, flexibility and posture for 30 minutes 1:1 with PT including:     Date  8/27/2020 8/25/2020 8/18/2020 8/13/2020 8/11/2020 8/6/2020 8/4/2020 07/29/2020 7/27/2020 7/22/2020 7/9/2020 7/7/2020 6/30/2020   VISIT 13/20 12/20 11/20 10/20 9/20 8/20 7/20 6/20 5/20 4/20 3/20 2/20 1/1   POC EXP. DATE 10/27/2020 8/30/2020 8/30/2020 8/30/2020 8/30/2020 8/30/2020 8/30/2020 8/30/2020 8/30/2020 8/30/2020 8/30/2020 8/30/2020 8/30/2020   VISIT AMOUNT  MEDICARE TOTAL 60.64  1053.12 60.64  992.48 60.64  931.84 90.96  871.20 90.96  780.24 90.96  689.28   598.32 90.96 90.96  416.40 60.64  325.44 60.64  264.80 90.96  204.16 113.20   FACE-TO-FACE 9/4/2020 9/4/2020 9/4/2020 9/4/2020 9/4/2020 9/4/2020 9/4/2020  "7/30/2020 7/30/2020 7/30/2020 7/30/2020 7/30/2020 7/30/2020   FOTO       -- -- 5/5 4/5 3/5 2/5 1/5                     Pulley Flexion  2' 2' 2' 2' 2' 2' -- Not today  2' 2' 2' 2' next   TABLE:                 Scapular retractions 30 x 3" 30 x 3" 20 x 3" 20 x 3" 20 x 3" 20 x 3" -- 20 x 3" 20 x 3" 20 x 3" 15 x 3" 15 x 3" 10 x 3"   Posterior shoulder roll 3 x 10 3 x 10 2 x 10 2 x 10  2 x 10  2 x 10 2 x 10 2 x 10  2 x 10 2 x 10 1 x 15 1 x 15 next   Wand flexion Seated  3 x 10 x 2# Seated  3 x 10 x 2# Seated  3 x 10 x 1# 3 x 10 x 1# bar 2 x 10 x 1# bar  2 x 10 x 1# bar 2 x 10 x 1#bar 2 x 10  2 x 10 2 x 10 1 x 15 1 x 15 next   Wand ABD Seated  3 x 10 x 2# Seated  3 x 10 x 2# Seated  3 x 10 x 1# 3 x 10 x 1# bar 2 x 10 x 1# bar 2 x 10 x 1# bar 2 x 10 x 1# bar 2 x 10  2 x 10 2 x 10 1 x 15 1 x 15    Scapular protractions NT NT NT 2 x 10 x 1# 2 x 10 x 1#  2 x 10 x 1# 2 x 10 x 1# 2 x 10  1 x 15       Reverse codman's NT NT NT 20 cw/ccw x 1# 20 cw/ccw x 1# NT 20 cw/ccw x 1# 20 cw/ccw 20 cw/ccw       Shoulder isometrics:  - Flexion  - Extension  - IR  - ER Active   2 x 10 BTB  2 x 10 BTB  2 x 10 BTB  2 x 10 BTB Active   2 x 10 BTB  2 x 10 BTB  2 x 10 BTB  2 x 10 BTB Active   3 x 10 GTB  3 x 10 GTB  3 x 10 GTB  3 x 10 GTB Active   --  3 x 10 GTB  3 x 10 GTB  3 x 10 GTB Active   2 x 10 GTB  2 x 10 GTB  2 x 10 GTB  2 x 10 GTB Active  2 x 10 GTB  2 x 10 GTB  2 x 10 GTB  2 x 10 GTB Active  2 x 10 RTB  2 x 10 RTB  2 x 10 RTB 2 x 10 RTB Active  1 x 15 RTB  1 x 15 RTB  1 x 15 RTB  1 x 15 RTB Active  1 x 15 RTB  1 x 15 RTB  1 x 15 RTB  1 x 15 RTB   10 x 3"  10 x 3"  10 x 3'  10 x 3'   10 x 3"  10 x 3"  10 x 3"  10 x 3"   10 x 3"  10 x 3"  10 x 3"  10 x 3"    1 x 10  1 x 10  1 x 10  1 x 10   Shldr AROM: seated  Flexion  scaption  abduction   1 x 10 x 2#  1 x 10 x 2#  1 x 10 x 2#   2 x 10 x 2#  2 x 10 x 2#  2 x 10 x 2#   2 x 10 x 2#  2 x 10 x 2#  2 x 10 x 2#   1 x 15 x 2#  2 x 10 x 1#  2 x 10 x 1#   1 x 15 x 2#  1 x 15 x 1#  1 x 15 x 1#   1 " "x 10 x 2#  1 x 10 x 1#  1 x 10 x 1#   2 x 10  2 x 10  2 x 10   1 x 15  1 x 15   1 x 15   1 x 10  1 x 10  1 x 10 Next?       Seated row 3 x 10 BTB 3 x 10 BTB 3 x 10 BTB 3 x 10 GTB 3 x 10 GTB 3 x 10 GTB 2 x 10 GTB                            STANDING:                 Pec str   -- -- -- -- -- -- -- 3 x 15" Next? Next?     IR stretch with strap -- 3 x 15" 3 x 15" 3 x 15" 3 x 15" 3 x 15" 3 x 15" Resume  3 x 15" 3 x 15" 1 x 10 Next?                                                                             Initials MA MA MA BJ BJ MA MA BJ  MA SB DG SB MA         Passive Range of Motion: measured in supine  Shoulder Left Right   Flexion 150 150   Abduction 140 150   ER at 90 70 70   IR 70 70      Active Range of Motion: measured in standing  Shoulder Left Right   Flexion 135 150   Abduction 140 160   ER at 0 Reach T2 Reach T2   IR Reach T8 Reach T8      Upper Extremity Strength    (L) UE (R) UE   Shoulder flexion: 4-/5 4/5   Shoulder Abduction: 4-/5 4-/5   Shoulder ER 3+/5 4/5   Shoulder IR 4/5 5/5   Lower Trap 4-/5 4/5   Middle Trap 4-/5 4/5   Rhomboids 4-/5 4/5         Annika receives moist hot pack x 10 minutes to bilateral shoulders to decrease pain and muscle guarding.     Home Exercises and Patient Education Provided     Education provided:   - HEP  - keeping exercises in a pain free range    Written Home Exercises Provided: yes.  Exercises were reviewed and Annika was able to demonstrate them prior to the end of the session.  Annika demonstrated fair  understanding of the education provided.      See EMR under Patient Instructions for exercises provided 6/30/2020.      Assessment     Annika is noted to have improved ROM and strength of bilateral shoulders with overall decreased pain during aDL's.  She continues to have difficulty with folding sheets, making bed, and overhead house work activities.  PT would benefit from continued therapy in order to progress strengthening exercises and return to prior level of function..  "     Annika is progressing slowly towards her goals.   Pt prognosis is Good.     Pt will continue to benefit from skilled outpatient physical therapy to address the deficits listed in the problem list box on initial evaluation, provide pt/family education and to maximize pt's level of independence in the home and community environment.     Pt's spiritual, cultural and educational needs considered and pt agreeable to plan of care and goals.    Anticipated Barriers for therapy: transportation, pain tolerance, apprehension       Goals:  Short Term Goals:  4 weeks  1. Patient will be compliant with HEP to promote the independent management of current diagnosis.  met  2. Patient will increase left shoulder flexion to 90 degrees for function of dressing. met  3. Patient will increase left shoulder functional ER to reach C2 for function of grooming. met  4. Patient will report a decrease in complaints of left shoulder pain to 6/10 during performance of ADL's for independence of self care activities.  In progress, not met        Long Term Goals:  8 weeks  1. Patient will increase left shoulder strength to 4-/5 to improve tolerance to light house work. In progress, not met  2. Patient will increase left shoulder flexion to 135 degrees in order to place dishes in overhead cabinets independently for self care independence.  met  3. Patient will increase left shoulder functional IR to reach T7 for function of dressing. In progress, not met  4. Patient will increase scapular stabilization strength to 4/5 to improve tolerance to normal lifting. In progress, not met      Plan     Continue with planned PT POC  2 times a week for 4 weeks.    Marino López, PT, DPT

## 2020-08-27 NOTE — PROGRESS NOTES
"  Occupational Therapy Daily Treatment Note     Date: 8/27/2020  Name: Annika Mac  Clinic Number: [id number]    Therapy Diagnosis:   Encounter Diagnoses   Name Primary?    Pain in both hands     Decreased range of motion of left thumb      Physician: Marky Hagen MD    Physician Orders: Eval & treat  Medical Diagnosis: B Hand Pain; CMC arthritis & De Quervain's  Surgical Procedure and Date: none  Evaluation Date: 07/09/20  Insurance Authorization Period Expiration: 12/31/20  Plan of Care Certification Period: 07/09/20 to 08/19/20  Date of Return to MD: TBD    Visit # / Visits authorized: 11 / 20     Time In: 11:20 am  Time Out:  12:05 pm   Total Billable Time:  45 minutes (P, 1MT, 1TE)    Precautions:  Standard and Diabetes      Subjective     Pt reports: "I still have pain in my L hand sometimes, but I think it's getting better".   Response to previous treatment: No adverse reactions noted or reported  Functional change: able to use both wrist/hands in light self-care tasks, but with pain    Pain: R=2-3/10;  L= 5-6/10 pre-tx   Location: bilateral wrists/hands      Objective     Annika received supervised modality after being cleared for contraindications, for 10 minutes, as follows:  -Paraffin w/ MHP to B wrist/hands, pre-tx to decrease pain & increase tissue extensibility    Annika received the following manual therapy techniques for 10 minutes:   -Edema mgmt w/ lymphatic drainage technique;  Deep STM, including MFR, CFM, TPR & scar mgmt to B forearm/wrist/hands, using hand techniques and IASTM tools to increase blood flow/circulation, improve soft tissue pliability and decrease pain.    Annika received therapeutic exercises for 25 minutes direct care including:    Objective measures taken today, see Updated Plan of Care for details     B wrist/hand    Dexterciser  3 min   Isospheres 3 min   PHG 3/10 reps, setting 2           Home Exercises and Education Provided     Education provided:   - Reviewed HEP  -Progress " towards goals    Written Home Exercises Provided: Patient instructed to cont prior HEP.  Exercises were reviewed and Annika was able to demonstrate them prior to the end of the session.  Annika demonstrated good  understanding of the HEP provided.     See EMR under Patient Instructions for exercises provided prior visit.        Assessment     See Updated Plan of Care for details on progress and status of goals          Annika is progressing well towards her goals and there are no updates to goals at this time. Pt prognosis is Good.     Pt will continue to benefit from skilled outpatient occupational therapy to address the deficits listed in the problem list on initial evaluation provide pt/family education and to maximize pt's level of independence in the home and community environment.     Anticipated barriers to occupational therapy: Arthritis, Diabetes    Pt's spiritual, cultural and educational needs considered and pt agreeable to plan of care and goals.    Plan     See Updated Plan of Care for details and recommendations for treatment plan.         Pratik Veloz, OT

## 2020-08-27 NOTE — PLAN OF CARE
Outpatient Therapy Updated Plan of Care     Visit Date: 8/27/2020  Name: Annika Mac  Clinic Number: [id number]    Therapy Diagnosis:   Encounter Diagnoses   Name Primary?    Pain in both hands     Decreased range of motion of left thumb      Physician: Marky Hagen MD    Physician Orders: Eval & treat  Medical Diagnosis: B Hand Pain; CMC arthritis & De Quervain's  Surgical Procedure and Date: none  Evaluation Date: 07/09/20      Total Visits Received: 11  Cancelled Visits: 0  No Show Visits: 0    Current Certification Period:  08/03/20 to 08/31/20  Precautions:  Standard, DM  Visits from Evaluation Date:  11  Functional Level Prior to Evaluation:  Independent    Subjective     Update: Patient feels condition is continuing to improve, but still has pain in her L wrist/hand.     Pain:  Functional Pain Scale Rating 0-10:   4-5/10 on average for L; 2/10 average for R  3-4/10 at best for L; 2/10 best for R  5/10 at worst for L; 3/10 worst for R  Location: B wrist/thumb   Description: Aching, Throbbing, Tingling  Aggravating Factors: Lifting and gripping, pinching, or any use with both hands/thumbs, wringing towels   Easing Factors: massage, heating pad, rest and splinting    Objective     Update:   Observation/Appearance:   Minimal edema present in L wrist/hand.     Edema. Measured in centimeters.   6/19/2019 6/19/2019 08/11/20 08/11/20 08/27/20 08/27/20    Left Right Left Right Left Right   Wrist Crease 18.9 19.2 18.7 (-.2) 19.0 (-.2) 18.4 (-.3) 18.5 (-.5)   Mid palm 20.8 21.4 20.5 (-.3) 21.3 (-.1) 20.8 (+.3) 20.7 (-.6)   MCPs 20.0 19.5 19.9 (-.1) 19.5 (=) 19.2 (-.7) 19.5 (=)     Edema. Measured in centimeters.   6/19/2019 6/19/2019 08/11/20 08/11/20 08/27/20 08/27/20    Left Right Left Right Left Right   Thumb:         Prox. Phalanx 7.4 7.5 6.7 (-.7) 6.9 (-.6) 6.7 (=) 7.0 (+.1)   IP 7.1 7.4 7.3 (+.2) 7.2 (-.2) 7.1 (-.2) 7.3 (+.1)     AROM   06/19/20 06/19/20 08/11/20 08/11/20 08/27/20 08/27/20    Left Right Left  Right Left Right   Wrist E/F 65* / 55* 60 / 65 * 67 (+2) / 60 (+5) 60 (=) / 60 (-5) 67 (=) / 60 (=) 60 (=) / 55 (-5)   Wrist RD/UD 15* / 30* 15* / 45 17 (+2) / 40 (+10) 21 (+6) / 45 (=) 15 (-2) / 35 (-5) 20 (-1) / 37 (-7)   Thumb R/P Abd 35* / 43* 50 / 50 45 (+10) / 45 (+2) 50 (=) / 55 (+5) 45/45 (=) 47 (-3) / 50 (-5)   Thumb MP flex 30* 60 58 (+28) 60 (=) 46 (-12) 50 (-10)   Thumb IP flex 31* 53 60 (+29) 57 (+4) 40 (-20) 50 (-7)   Thumb JASSO's 61 113 118 (+57) 117 (+4) 86 (-32) 100 (-17)   Thumb Opp To SF DPC 3.0 cm fro SF DPC * To SF DPC To SF DPC To SF MCP To SF MCP   * pain with testing    Sensation: Numbness & tingling in L wrist to thumb tip has improved some, but remains a problem; intact for light touch & temperature, B'ly     Strength (Dyanmometer) and Pinch Strength (Pinch Gauge)  Measured in pounds and psi. Average of three trials.   6/19/2019 6/19/2019 08/11/20 08/11/20 08/27/20 08/27/20    Left Right Left Right Left Right   Rung II 10* 54 29* (+19) 63 (+9) 36 * (+7) 70 (+7)   Key Pinch 6* 15* 10.5* (+4.5) 16 (+1)  10 * (-.5) 19.5 (+3.5)   3pt Pinch 1* 7* 4* (+3) 9* (+2) 6 * (+2) 11 (+2)   2pt Pinch NT 9* 4* (+4) 7 (-2) 5 * (+1) 7 (=)   * pain with testing      Assessment     Update: Patient has made progress towards strength and edema goals, but she has had a decrease in ROM since last reassessment.   Pain and edema has decreased and patient is able to use her B hands more functionally, however, this has not completely resolved, especially for her L wrist/thumb.  Patient may benefit from a custom CMC orthosis to provide better stabilization during resistive activity in ADL/IADL's.        Goals:  Short Term Goals: (in 2 weeks)  1) Patient will be independent in HEP-met  2) Decrease pain in B hands/thumbs to no more than 6-7/10 worst in ADL/IADL's-met  3) Increase AROM in L thumb Rad/Palm ABD by 3-5 degrees for improved functioning in ADL/IADL's-previously met  4) Increase B  strength by 3-5 psi  for increased functional use-met  5) Decrease edema in L wrist/thumb to trace-met      Long Term Goals: (in 6 weeks)  1) Decrease pain in B hands/thumbs to no more than 2-3/10 worst in ADL/IADL's-part met/ongoing  2) Increase AROM of B hands/thumbs to WFL for increased functioning in ADL/IADL's-met  3) Increase strength in B  by 20% of initial measures for improved functioning in ADL/IADL's-ongoing  4) Increased functioning in ADL/IADL's, as evidenced by a FOTO impairment rating of no more than 43%-ongoing  5) Decrease edema in B wrist/hand to trace or none-met    Previous Short Term Goals Status:   5/5 STG's met   New Short Term Goals Status:   progress to unmet LTG's  Long Term Goal Status:   continue per initial plan of care.  Reasons for Recertification of Therapy:   Updated Plan of Care needed    Plan     Updated Certification Period:  08/27/20 to 09/23/20    Recommended Treatment Plan: 2 times per week for 4 weeks: Manual Therapy, Moist Heat/ Ice, Paraffin, Patient Education, Self Care, Therapeutic Activites, Therapeutic Exercise and Ultrasound       Other Recommendations: progress strengthening, as tolerated    Pratik Veloz OT  8/27/2020      I CERTIFY THE NEED FOR THESE SERVICES FURNISHED UNDER THIS PLAN OF TREATMENT AND WHILE UNDER MY CARE    Physician's comments:        Physician's Signature: ___________________________________________________

## 2020-08-27 NOTE — PLAN OF CARE
Outpatient Therapy Updated Plan of Care     Visit Date: 8/27/2020  Name: Annika Mac  Clinic Number: [id number]    Therapy Diagnosis:   Encounter Diagnoses   Name Primary?    Decreased ROM of left shoulder     Muscle weakness of left arm      Physician: IGLESIA Garcia MD    Physician Orders: PT Eval and Treat   Medical Diagnosis from Referral: M25.512,G89.29 (ICD-10-CM) - Chronic left shoulder pain   Evaluation Date: 6/30/2020    Total Visits Received: 13  Cancelled Visits: 7  No Show Visits: 0    Current Certification Period:  6/30/2020 to 8/30/2020  Precautions:  standard  Visits from Evaluation Date:  12  Functional Level Prior to Evaluation:  independent    Subjective     Update: Having increased pain in all joints due to weather today.  Her left shoulder is more stiff and sore as compared to right.  Pt is reporting improved functional mobility with dressing, grooming, but continues to have pain with overhead activities and heavy house work    Objective     Update:   Passive Range of Motion: measured in supine  Shoulder Left Right   Flexion 150 150   Abduction 140 150   ER at 90 70 70   IR 70 70      Active Range of Motion: measured in standing  Shoulder Left Right   Flexion 135 150   Abduction 140 160   ER at 0 Reach T2 Reach T2   IR Reach T8 Reach T8      Upper Extremity Strength    (L) UE (R) UE   Shoulder flexion: 4-/5 4/5   Shoulder Abduction: 4-/5 4-/5   Shoulder ER 3+/5 4/5   Shoulder IR 4/5 5/5   Lower Trap 4-/5 4/5   Middle Trap 4-/5 4/5   Rhomboids 4-/5 4/5          Assessment     Update: Annika is noted to have improved ROM and strength of bilateral shoulders with overall decreased pain during aDL's.  She continues to have difficulty with folding sheets, making bed, and overhead house work activities.  PT would benefit from continued therapy in order to progress strengthening exercises and return to prior level of function..      Previous Short Term Goals Status: not met  New Short Term Goals  Status:     Short Term Goals:  4 weeks  1. Patient will be compliant with HEP to promote the independent management of current diagnosis.  met  2. Patient will increase left shoulder flexion to 90 degrees for function of dressing. met  3. Patient will increase left shoulder functional ER to reach C2 for function of grooming. met  4. Patient will report a decrease in complaints of left shoulder pain to 6/10 during performance of ADL's for independence of self care activities.  In progress, not met        Long Term Goals:  8 weeks  1. Patient will increase left shoulder strength to 4-/5 to improve tolerance to light house work. In progress, not met  2. Patient will increase left shoulder flexion to 135 degrees in order to place dishes in overhead cabinets independently for self care independence.  met  3. Patient will increase left shoulder functional IR to reach T7 for function of dressing. In progress, not met  4. Patient will increase scapular stabilization strength to 4/5 to improve tolerance to normal lifting. In progress, not met  Long Term Goal Status:   continue per initial plan of care.  Reasons for Recertification of Therapy:   Pt's POC  prior to completion of visits.  She is progressing with ROM and strength and would benefit from continued treatment.    Plan     Updated Certification Period: 2020 to 10/27/2020  Recommended Treatment Plan: 2 times per week for 8 weeks: Manual Therapy, Moist Heat/ Ice, Neuromuscular Re-ed, Patient Education, Therapeutic Activites, Therapeutic Exercise and IASTM, vacuum cupping, and kinesiotaping  Other Recommendations: none    Marino López, PT  2020      I CERTIFY THE NEED FOR THESE SERVICES FURNISHED UNDER THIS PLAN OF TREATMENT AND WHILE UNDER MY CARE    Physician's comments:        Physician's Signature: ___________________________________________________

## 2020-09-02 ENCOUNTER — DOCUMENTATION ONLY (OUTPATIENT)
Dept: REHABILITATION | Facility: HOSPITAL | Age: 70
End: 2020-09-02

## 2020-09-02 ENCOUNTER — CLINICAL SUPPORT (OUTPATIENT)
Dept: REHABILITATION | Facility: HOSPITAL | Age: 70
End: 2020-09-02
Payer: MEDICARE

## 2020-09-02 DIAGNOSIS — M25.642 DECREASED RANGE OF MOTION OF LEFT THUMB: ICD-10-CM

## 2020-09-02 DIAGNOSIS — M79.642 PAIN IN BOTH HANDS: ICD-10-CM

## 2020-09-02 DIAGNOSIS — M25.612 DECREASED ROM OF LEFT SHOULDER: ICD-10-CM

## 2020-09-02 DIAGNOSIS — M79.641 PAIN IN BOTH HANDS: ICD-10-CM

## 2020-09-02 DIAGNOSIS — M62.81 MUSCLE WEAKNESS OF LEFT ARM: ICD-10-CM

## 2020-09-02 PROCEDURE — 97110 THERAPEUTIC EXERCISES: CPT | Mod: PO

## 2020-09-02 PROCEDURE — 97140 MANUAL THERAPY 1/> REGIONS: CPT | Mod: PO

## 2020-09-02 PROCEDURE — 97018 PARAFFIN BATH THERAPY: CPT | Mod: PO,59

## 2020-09-02 NOTE — PROGRESS NOTES
"  Occupational Therapy Daily Treatment Note     Date: 9/2/2020  Name: Annika Mac  Clinic Number: [id number]    Therapy Diagnosis:   Encounter Diagnoses   Name Primary?    Pain in both hands     Decreased range of motion of left thumb      Physician: Marky Hagen MD    Physician Orders: Eval & treat  Medical Diagnosis: B Hand Pain; CMC arthritis & De Quervain's  Surgical Procedure and Date: none  Evaluation Date: 07/09/20  Insurance Authorization Period Expiration: 12/31/20  Plan of Care Certification Period: 07/09/20 to 08/19/20  Date of Return to MD: TBD    Visit # / Visits authorized: 12 / 20     Time In: 1:00 pm  Time Out:  1:45 pm   Total Billable Time:  45 minutes (P, 1MT, 1TE)    Precautions:  Standard and Diabetes      Subjective     Pt reports: "I still have pain in my L hand sometimes, but I think it's getting better".   Response to previous treatment: No adverse reactions noted or reported  Functional change: able to use both wrist/hands in light self-care tasks, but with pain    Pain: R+3-4/10;  L= 4-510 pre-tx   Location: bilateral wrists/hands      Objective     Annika received supervised modality after being cleared for contraindications, for 10 minutes, as follows:  -Paraffin w/ MHP to B wrist/hands, pre-tx to decrease pain & increase tissue extensibility    Annika received the following manual therapy techniques for 10 minutes:   -Edema mgmt w/ lymphatic drainage technique;  Deep STM, including MFR, CFM, TPR & scar mgmt to B forearm/wrist/hands, using hand techniques and IASTM tools to increase blood flow/circulation, improve soft tissue pliability and decrease pain.    Annika received therapeutic exercises for 25 minutes direct care including:  B wrist/hand    Dexterciser  3 min   Isospheres 3 min   9 Peg  1 trial each hand   PHG 3/10 reps, setting 2           Home Exercises and Education Provided     Education provided:   - Reviewed HEP  -Progress towards goals    Written Home Exercises Provided: " Patient instructed to cont prior HEP.  Exercises were reviewed and Annika was able to demonstrate them prior to the end of the session.  Annika demonstrated good  understanding of the HEP provided.     See EMR under Patient Instructions for exercises provided prior visit.        Assessment     Pain level has been relatively the same over the past few OT visits, however, patient was able to perform all above listed therapeutic exercises without increased pain or difficulty today.      Annika is progressing well towards her goals and there are no updates to goals at this time. Pt prognosis is Good.     Pt will continue to benefit from skilled outpatient occupational therapy to address the deficits listed in the problem list on initial evaluation provide pt/family education and to maximize pt's level of independence in the home and community environment.     Anticipated barriers to occupational therapy: Arthritis, Diabetes    Pt's spiritual, cultural and educational needs considered and pt agreeable to plan of care and goals.    Goals:  Short Term Goals: (in 2 weeks)  1) Patient will be independent in HEP-met  2) Decrease pain in B hands/thumbs to no more than 6-7/10 worst in ADL/IADL's-met  3) Increase AROM in L thumb Rad/Palm ABD by 3-5 degrees for improved functioning in ADL/IADL's-previously met  4) Increase B  strength by 3-5 psi for increased functional use-met  5) Decrease edema in L wrist/thumb to trace-met      Long Term Goals: (in 6 weeks)  1) Decrease pain in B hands/thumbs to no more than 2-3/10 worst in ADL/IADL's-part met/ongoing  2) Increase AROM of B hands/thumbs to WFL for increased functioning in ADL/IADL's-met  3) Increase strength in B  by 20% of initial measures for improved functioning in ADL/IADL's-ongoing  4) Increased functioning in ADL/IADL's, as evidenced by a FOTO impairment rating of no more than 43%-ongoing  5) Decrease edema in B wrist/hand to trace or none-met    Plan     Updated  Certification Period:  08/27/20 to 09/23/20     Recommended Treatment Plan: 2 times per week for 4 weeks: Manual Therapy, Moist Heat/ Ice, Paraffin, Patient Education, Self Care, Therapeutic Activites, Therapeutic Exercise and Ultrasound         Other Recommendations: progress strengthening, as tolerated         Pratik Veloz, OT

## 2020-09-02 NOTE — PROGRESS NOTES
Face to Face PTA Conference performed with Irena Barron PTA regarding patient's current status, overall progress, and plan of care. Pt will be seen by a physical therapist minimally every 6th visit or every 30 days.    Face to Face PTA Conference performed with Marino López PT regarding patient's current status, overall progress, and plan of care. Pt will be seen by a physical therapist minimally every 6th visit or every 30 days.    Irena Barron PTA  9/2/2020

## 2020-09-02 NOTE — PROGRESS NOTES
Physical Therapy Daily Treatment Note     Name: Annika Mac  Clinic Number: [id number]    Therapy Diagnosis:   Encounter Diagnoses   Name Primary?    Decreased ROM of left shoulder     Muscle weakness of left arm      Physician: IGLESIA Garcia MD    Visit Date: 9/2/2020    Physician Orders: PT Eval and Treat   Medical Diagnosis from Referral: M25.512,G89.29 (ICD-10-CM) - Chronic left shoulder pain   Evaluation Date: 6/30/2020  Authorization Period Expiration: 06/17/2021   Plan of Care Expiration: 8/30/2020  Visit # / Visits authorized: 13/ 20     Time In: 1:45 pm  Time Out: 2:30 pm  Total Appointment Time (timed & untimed codes): 30 minutes     Precautions: Standard and Fall      Subjective     Pt reports:  Pt helped clean up some debris in her yard this weekend and has some soreness in shoulders.  She was compliant with home exercise program.  Response to previous treatment: slight decrease in pain  Functional change: improved AROM    Pain: 5/10   Location: left shoulder      Objective     Annika received therapeutic exercises to develop strength, ROM, flexibility and posture for 30 minutes 1:1 with PT including:     Date  9/2/2020 8/27/2020 8/25/2020 8/18/2020 8/13/2020 8/11/2020 8/6/2020 8/4/2020 07/29/2020 7/27/2020 7/22/2020 7/9/2020 7/7/2020 6/30/2020   VISIT 14/20 13/20 12/20 11/20 10/20 9/20 8/20 7/20 6/20 5/20 4/20 3/20 2/20 1/1   POC EXP. DATE 10/27/2020 10/27/2020 8/30/2020 8/30/2020 8/30/2020 8/30/2020 8/30/2020 8/30/2020 8/30/2020 8/30/2020 8/30/2020 8/30/2020 8/30/2020 8/30/2020   VISIT AMOUNT  MEDICARE TOTAL 60.64  1113.76 60.64  1053.12 60.64  992.48 60.64  931.84 90.96  871.20 90.96  780.24 90.96  689.28   598.32 90.96 90.96  416.40 60.64  325.44 60.64  264.80 90.96  204.16 113.20   FACE-TO-FACE 10/2/2020 9/4/2020 9/4/2020 9/4/2020 9/4/2020 9/4/2020 9/4/2020 9/4/2020 7/30/2020 7/30/2020 7/30/2020 7/30/2020 7/30/2020 7/30/2020   FOTO        -- -- 5/5 4/5 3/5 2/5 1/5                      Pulley  "Flexion  2' 2' 2' 2' 2' 2' 2' -- Not today  2' 2' 2' 2' next   TABLE:                  Scapular retractions 30 x 3" 30 x 3" 30 x 3" 20 x 3" 20 x 3" 20 x 3" 20 x 3" -- 20 x 3" 20 x 3" 20 x 3" 15 x 3" 15 x 3" 10 x 3"   Posterior shoulder roll 3 x 10 3 x 10 3 x 10 2 x 10 2 x 10  2 x 10  2 x 10 2 x 10 2 x 10  2 x 10 2 x 10 1 x 15 1 x 15 next   Wand flexion Seated   3 x 10 x 2# Seated  3 x 10 x 2# Seated  3 x 10 x 2# Seated  3 x 10 x 1# 3 x 10 x 1# bar 2 x 10 x 1# bar  2 x 10 x 1# bar 2 x 10 x 1#bar 2 x 10  2 x 10 2 x 10 1 x 15 1 x 15 next   Wand ABD Seated  3 x 10 x 2# Seated  3 x 10 x 2# Seated  3 x 10 x 2# Seated  3 x 10 x 1# 3 x 10 x 1# bar 2 x 10 x 1# bar 2 x 10 x 1# bar 2 x 10 x 1# bar 2 x 10  2 x 10 2 x 10 1 x 15 1 x 15    Scapular protractions  NT NT NT 2 x 10 x 1# 2 x 10 x 1#  2 x 10 x 1# 2 x 10 x 1# 2 x 10  1 x 15       Reverse codman's  NT NT NT 20 cw/ccw x 1# 20 cw/ccw x 1# NT 20 cw/ccw x 1# 20 cw/ccw 20 cw/ccw       Shoulder:  - Flexion  - Extension  - IR  - ER Active   3 x 10 BTB  3 x 10 BTB  3 x 10 BTB  3 x 10 BTB Active   2 x 10 BTB  2 x 10 BTB  2 x 10 BTB  2 x 10 BTB Active   2 x 10 BTB  2 x 10 BTB  2 x 10 BTB  2 x 10 BTB Active   3 x 10 GTB  3 x 10 GTB  3 x 10 GTB  3 x 10 GTB Active   --  3 x 10 GTB  3 x 10 GTB  3 x 10 GTB Active   2 x 10 GTB  2 x 10 GTB  2 x 10 GTB  2 x 10 GTB Active  2 x 10 GTB  2 x 10 GTB  2 x 10 GTB  2 x 10 GTB Active  2 x 10 RTB  2 x 10 RTB  2 x 10 RTB 2 x 10 RTB Active  1 x 15 RTB  1 x 15 RTB  1 x 15 RTB  1 x 15 RTB Active  1 x 15 RTB  1 x 15 RTB  1 x 15 RTB  1 x 15 RTB   10 x 3"  10 x 3"  10 x 3'  10 x 3'   10 x 3"  10 x 3"  10 x 3"  10 x 3"   10 x 3"  10 x 3"  10 x 3"  10 x 3"    1 x 10  1 x 10  1 x 10  1 x 10   Shldr AROM: seated  Flexion  scaption  abduction   2 x 10 x 2#  2 x 10 x 2#  2 x 10 x 2#   1 x 10 x 2#  1 x 10 x 2#  1 x 10 x 2#   2 x 10 x 2#  2 x 10 x 2#  2 x 10 x 2#   2 x 10 x 2#  2 x 10 x 2#  2 x 10 x 2#   1 x 15 x 2#  2 x 10 x 1#  2 x 10 x 1#   1 x 15 x 2#  1 x 15 " "x 1#  1 x 15 x 1#   1 x 10 x 2#  1 x 10 x 1#  1 x 10 x 1#   2 x 10  2 x 10  2 x 10   1 x 15  1 x 15   1 x 15   1 x 10  1 x 10  1 x 10 Next?       Seated row 3 x 10 BTB 3 x 10 BTB 3 x 10 BTB 3 x 10 BTB 3 x 10 GTB 3 x 10 GTB 3 x 10 GTB 2 x 10 GTB          W  I  T 1 x 10 YTB  1 x 10 YTB  1 x 10 YTB                                   STANDING:                  Pec str    -- -- -- -- -- -- -- 3 x 15" Next? Next?     IR stretch with strap 3 x 15" -- 3 x 15" 3 x 15" 3 x 15" 3 x 15" 3 x 15" 3 x 15" Resume  3 x 15" 3 x 15" 1 x 10 Next?                                                                                 Initials CRYSTAL THOMAS DG SB MA               Annika receives moist hot pack x 10 minutes to bilateral shoulders to decrease pain and muscle guarding.     Home Exercises and Patient Education Provided     Education provided:   - HEP  - keeping exercises in a pain free range    Written Home Exercises Provided: yes.  Exercises were reviewed and Annika was able to demonstrate them prior to the end of the session.  Annika demonstrated fair  understanding of the education provided.      See EMR under Patient Instructions for exercises provided 6/30/2020.      Assessment     Annika is progressed to WIT exercises and has no increase in symptoms following treatment.  She continues to fatigue during AROM with 2#db and is unable to progress to 3 sets.  Pt will continue with current plan of care and progress WIT resistance to red therband next visit.       Annika is progressing slowly towards her goals.   Pt prognosis is Good.     Pt will continue to benefit from skilled outpatient physical therapy to address the deficits listed in the problem list box on initial evaluation, provide pt/family education and to maximize pt's level of independence in the home and community environment.     Pt's spiritual, cultural and educational needs considered and pt agreeable to plan of care and goals.    Anticipated Barriers for " therapy: transportation, pain tolerance, apprehension       Goals:  Short Term Goals:  4 weeks  1. Patient will be compliant with HEP to promote the independent management of current diagnosis.  met  2. Patient will increase left shoulder flexion to 90 degrees for function of dressing. met  3. Patient will increase left shoulder functional ER to reach C2 for function of grooming. met  4. Patient will report a decrease in complaints of left shoulder pain to 6/10 during performance of ADL's for independence of self care activities.  In progress, not met        Long Term Goals:  8 weeks  1. Patient will increase left shoulder strength to 4-/5 to improve tolerance to light house work. In progress, not met  2. Patient will increase left shoulder flexion to 135 degrees in order to place dishes in overhead cabinets independently for self care independence.  met  3. Patient will increase left shoulder functional IR to reach T7 for function of dressing. In progress, not met  4. Patient will increase scapular stabilization strength to 4/5 to improve tolerance to normal lifting. In progress, not met      Plan     Continue with planned PT POC.  Progress WIT resistance to red theraband    Marino López, PT, DPT

## 2020-09-03 ENCOUNTER — TELEPHONE (OUTPATIENT)
Dept: ENDOCRINOLOGY | Facility: CLINIC | Age: 70
End: 2020-09-03

## 2020-09-03 NOTE — TELEPHONE ENCOUNTER
Called [facility] [phone]  Rx for Dexcom and chart note in order to get supply. Contact no and fax no was provided in VM.   Detailed information provided to call back.

## 2020-09-08 ENCOUNTER — IMMUNIZATION (OUTPATIENT)
Dept: PHARMACY | Facility: CLINIC | Age: 70
End: 2020-09-08
Payer: MEDICARE

## 2020-09-08 ENCOUNTER — LAB VISIT (OUTPATIENT)
Dept: LAB | Facility: HOSPITAL | Age: 70
End: 2020-09-08
Payer: MEDICARE

## 2020-09-08 DIAGNOSIS — D63.1 ANEMIA OF RENAL DISEASE: ICD-10-CM

## 2020-09-08 DIAGNOSIS — I10 ESSENTIAL HYPERTENSION: ICD-10-CM

## 2020-09-08 DIAGNOSIS — N18.4 TYPE 2 DM WITH CKD STAGE 4 AND HYPERTENSION: ICD-10-CM

## 2020-09-08 DIAGNOSIS — E87.20 ACIDOSIS, METABOLIC: ICD-10-CM

## 2020-09-08 DIAGNOSIS — E11.22 TYPE 2 DM WITH CKD STAGE 4 AND HYPERTENSION: ICD-10-CM

## 2020-09-08 DIAGNOSIS — N18.30 CKD (CHRONIC KIDNEY DISEASE) STAGE 3, GFR 30-59 ML/MIN: ICD-10-CM

## 2020-09-08 DIAGNOSIS — E55.9 VITAMIN D INSUFFICIENCY: ICD-10-CM

## 2020-09-08 DIAGNOSIS — N18.9 ANEMIA OF RENAL DISEASE: ICD-10-CM

## 2020-09-08 DIAGNOSIS — I12.9 TYPE 2 DM WITH CKD STAGE 4 AND HYPERTENSION: ICD-10-CM

## 2020-09-08 DIAGNOSIS — E78.5 HYPERLIPIDEMIA, UNSPECIFIED HYPERLIPIDEMIA TYPE: ICD-10-CM

## 2020-09-08 LAB
25(OH)D3+25(OH)D2 SERPL-MCNC: 36 NG/ML (ref 30–96)
ALBUMIN SERPL BCP-MCNC: 3.4 G/DL (ref 3.5–5.2)
ANION GAP SERPL CALC-SCNC: 7 MMOL/L (ref 8–16)
BUN SERPL-MCNC: 30 MG/DL (ref 8–23)
CALCIUM SERPL-MCNC: 9.3 MG/DL (ref 8.7–10.5)
CHLORIDE SERPL-SCNC: 109 MMOL/L (ref 95–110)
CHOLEST SERPL-MCNC: 134 MG/DL (ref 120–199)
CHOLEST/HDLC SERPL: 3.2 {RATIO} (ref 2–5)
CO2 SERPL-SCNC: 25 MMOL/L (ref 23–29)
CREAT SERPL-MCNC: 1.8 MG/DL (ref 0.5–1.4)
ERYTHROCYTE [DISTWIDTH] IN BLOOD BY AUTOMATED COUNT: 16.3 % (ref 11.5–14.5)
EST. GFR  (AFRICAN AMERICAN): 32.4 ML/MIN/1.73 M^2
EST. GFR  (NON AFRICAN AMERICAN): 28.1 ML/MIN/1.73 M^2
FERRITIN SERPL-MCNC: 78 NG/ML (ref 20–300)
GLUCOSE SERPL-MCNC: 220 MG/DL (ref 70–110)
HCT VFR BLD AUTO: 30.5 % (ref 37–48.5)
HDLC SERPL-MCNC: 42 MG/DL (ref 40–75)
HDLC SERPL: 31.3 % (ref 20–50)
HGB BLD-MCNC: 9.2 G/DL (ref 12–16)
IRON SERPL-MCNC: 34 UG/DL (ref 30–160)
LDLC SERPL CALC-MCNC: 69.8 MG/DL (ref 63–159)
MCH RBC QN AUTO: 28.1 PG (ref 27–31)
MCHC RBC AUTO-ENTMCNC: 30.2 G/DL (ref 32–36)
MCV RBC AUTO: 93 FL (ref 82–98)
NONHDLC SERPL-MCNC: 92 MG/DL
PHOSPHATE SERPL-MCNC: 3.6 MG/DL (ref 2.7–4.5)
PLATELET # BLD AUTO: 248 K/UL (ref 150–350)
PMV BLD AUTO: 11.8 FL (ref 9.2–12.9)
POTASSIUM SERPL-SCNC: 4.9 MMOL/L (ref 3.5–5.1)
RBC # BLD AUTO: 3.27 M/UL (ref 4–5.4)
SATURATED IRON: 11 % (ref 20–50)
SODIUM SERPL-SCNC: 141 MMOL/L (ref 136–145)
TOTAL IRON BINDING CAPACITY: 311 UG/DL (ref 250–450)
TRANSFERRIN SERPL-MCNC: 210 MG/DL (ref 200–375)
TRIGL SERPL-MCNC: 111 MG/DL (ref 30–150)
TSH SERPL DL<=0.005 MIU/L-ACNC: 1.36 UIU/ML (ref 0.4–4)
WBC # BLD AUTO: 5.96 K/UL (ref 3.9–12.7)

## 2020-09-08 PROCEDURE — 84443 ASSAY THYROID STIM HORMONE: CPT

## 2020-09-08 PROCEDURE — 82728 ASSAY OF FERRITIN: CPT

## 2020-09-08 PROCEDURE — 80061 LIPID PANEL: CPT

## 2020-09-08 PROCEDURE — 36415 COLL VENOUS BLD VENIPUNCTURE: CPT

## 2020-09-08 PROCEDURE — 85027 COMPLETE CBC AUTOMATED: CPT

## 2020-09-08 PROCEDURE — 82306 VITAMIN D 25 HYDROXY: CPT

## 2020-09-08 PROCEDURE — 83540 ASSAY OF IRON: CPT

## 2020-09-08 PROCEDURE — 83970 ASSAY OF PARATHORMONE: CPT

## 2020-09-08 PROCEDURE — 80069 RENAL FUNCTION PANEL: CPT

## 2020-09-09 ENCOUNTER — CLINICAL SUPPORT (OUTPATIENT)
Dept: REHABILITATION | Facility: HOSPITAL | Age: 70
End: 2020-09-09
Payer: MEDICARE

## 2020-09-09 DIAGNOSIS — M62.81 MUSCLE WEAKNESS OF LEFT ARM: ICD-10-CM

## 2020-09-09 DIAGNOSIS — M79.641 PAIN IN BOTH HANDS: ICD-10-CM

## 2020-09-09 DIAGNOSIS — M25.642 DECREASED RANGE OF MOTION OF LEFT THUMB: ICD-10-CM

## 2020-09-09 DIAGNOSIS — M25.612 DECREASED ROM OF LEFT SHOULDER: ICD-10-CM

## 2020-09-09 DIAGNOSIS — M79.642 PAIN IN BOTH HANDS: ICD-10-CM

## 2020-09-09 LAB — PTH-INTACT SERPL-MCNC: 113 PG/ML (ref 9–77)

## 2020-09-09 PROCEDURE — 97110 THERAPEUTIC EXERCISES: CPT | Mod: PO

## 2020-09-09 PROCEDURE — 97140 MANUAL THERAPY 1/> REGIONS: CPT | Mod: PO

## 2020-09-09 PROCEDURE — 97018 PARAFFIN BATH THERAPY: CPT | Mod: PO

## 2020-09-09 NOTE — PROGRESS NOTES
Physical Therapy Daily Treatment Note     Name: Annika Mac  Clinic Number: [id number]    Therapy Diagnosis:   Encounter Diagnoses   Name Primary?    Decreased ROM of left shoulder     Muscle weakness of left arm      Physician: IGLESIA Garcia MD    Visit Date: 9/9/2020    Physician Orders: PT Eval and Treat   Medical Diagnosis from Referral: M25.512,G89.29 (ICD-10-CM) - Chronic left shoulder pain   Evaluation Date: 6/30/2020  Authorization Period Expiration: 06/17/2021   Plan of Care Expiration: 8/30/2020  Visit # / Visits authorized: 15/ 20     Time In: 11:00 am  Time Out: 11:45 am  Total Appointment Time (timed & untimed codes): 30 minutes     Precautions: Standard and Fall      Subjective     Pt reports:  Having increase in left shoulder pain this morning.  She was compliant with home exercise program.  Response to previous treatment: slight decrease in pain  Functional change: improved AROM    Pain: 6/10   Location: left shoulder      Objective     Annika received therapeutic exercises to develop strength, ROM, flexibility and posture for 30 minutes 1:1 with PT including:     Date  9/9/2020 9/2/2020 8/27/2020 8/25/2020 8/18/2020 8/13/2020 8/11/2020 8/6/2020 8/4/2020 07/29/2020 7/27/2020 7/22/2020 7/9/2020 7/7/2020 6/30/2020   VISIT 15/20 14/20 13/20 12/20 11/20 10/20 9/20 8/20 7/20 6/20 5/20 4/20 3/20 2/20 1/1   POC EXP. DATE 10/27/2020 10/27/2020 10/27/2020 8/30/2020 8/30/2020 8/30/2020 8/30/2020 8/30/2020 8/30/2020 8/30/2020 8/30/2020 8/30/2020 8/30/2020 8/30/2020 8/30/2020   VISIT AMOUNT  MEDICARE TOTAL 60.64  1174.40 60.64  1113.76 60.64  1053.12 60.64  992.48 60.64  931.84 90.96  871.20 90.96  780.24 90.96  689.28   598.32 90.96 90.96  416.40 60.64  325.44 60.64  264.80 90.96  204.16 113.20   FACE-TO-FACE 10/2/2020 10/2/2020 9/4/2020 9/4/2020 9/4/2020 9/4/2020 9/4/2020 9/4/2020 9/4/2020 7/30/2020 7/30/2020 7/30/2020 7/30/2020 7/30/2020 7/30/2020   FOTO         -- -- 5/5 4/5 3/5 2/5 1/5                    "    Pulley Flexion  2' 2' 2' 2' 2' 2' 2' 2' -- Not today  2' 2' 2' 2' next   TABLE:                   Scapular retractions 30 x 3" 30 x 3" 30 x 3" 30 x 3" 20 x 3" 20 x 3" 20 x 3" 20 x 3" -- 20 x 3" 20 x 3" 20 x 3" 15 x 3" 15 x 3" 10 x 3"   Posterior shoulder roll 3 x 10 3 x 10 3 x 10 3 x 10 2 x 10 2 x 10  2 x 10  2 x 10 2 x 10 2 x 10  2 x 10 2 x 10 1 x 15 1 x 15 next   Wand flexion Seated   3 x 10 x 3# Seated   3 x 10 x 2# Seated  3 x 10 x 2# Seated  3 x 10 x 2# Seated  3 x 10 x 1# 3 x 10 x 1# bar 2 x 10 x 1# bar  2 x 10 x 1# bar 2 x 10 x 1#bar 2 x 10  2 x 10 2 x 10 1 x 15 1 x 15 next   Wand ABD Seated  3 x 10 x 3# Seated  3 x 10 x 2# Seated  3 x 10 x 2# Seated  3 x 10 x 2# Seated  3 x 10 x 1# 3 x 10 x 1# bar 2 x 10 x 1# bar 2 x 10 x 1# bar 2 x 10 x 1# bar 2 x 10  2 x 10 2 x 10 1 x 15 1 x 15    Scapular protractions NT  NT NT NT 2 x 10 x 1# 2 x 10 x 1#  2 x 10 x 1# 2 x 10 x 1# 2 x 10  1 x 15       Reverse codman's NT  NT NT NT 20 cw/ccw x 1# 20 cw/ccw x 1# NT 20 cw/ccw x 1# 20 cw/ccw 20 cw/ccw       Shoulder:  - Flexion  - Extension  - IR  - ER Active   3 x 10 BTB  3 x 10 BTB  3 x 10 BTB  3 x 10 BTB Active   3 x 10 BTB  3 x 10 BTB  3 x 10 BTB  3 x 10 BTB Active   2 x 10 BTB  2 x 10 BTB  2 x 10 BTB  2 x 10 BTB Active   2 x 10 BTB  2 x 10 BTB  2 x 10 BTB  2 x 10 BTB Active   3 x 10 GTB  3 x 10 GTB  3 x 10 GTB  3 x 10 GTB Active   --  3 x 10 GTB  3 x 10 GTB  3 x 10 GTB Active   2 x 10 GTB  2 x 10 GTB  2 x 10 GTB  2 x 10 GTB Active  2 x 10 GTB  2 x 10 GTB  2 x 10 GTB  2 x 10 GTB Active  2 x 10 RTB  2 x 10 RTB  2 x 10 RTB 2 x 10 RTB Active  1 x 15 RTB  1 x 15 RTB  1 x 15 RTB  1 x 15 RTB Active  1 x 15 RTB  1 x 15 RTB  1 x 15 RTB  1 x 15 RTB   10 x 3"  10 x 3"  10 x 3'  10 x 3'   10 x 3"  10 x 3"  10 x 3"  10 x 3"   10 x 3"  10 x 3"  10 x 3"  10 x 3"    1 x 10  1 x 10  1 x 10  1 x 10   Shldr AROM: seated  Flexion  scaption  abduction   2 x 10 x 2#  2 x 10 x 2#  2 x 10 x 2#   2 x 10 x 2#  2 x 10 x 2#  2 x 10 x 2#   1 x 10 " "x 2#  1 x 10 x 2#  1 x 10 x 2#   2 x 10 x 2#  2 x 10 x 2#  2 x 10 x 2#   2 x 10 x 2#  2 x 10 x 2#  2 x 10 x 2#   1 x 15 x 2#  2 x 10 x 1#  2 x 10 x 1#   1 x 15 x 2#  1 x 15 x 1#  1 x 15 x 1#   1 x 10 x 2#  1 x 10 x 1#  1 x 10 x 1#   2 x 10  2 x 10  2 x 10   1 x 15  1 x 15   1 x 15   1 x 10  1 x 10  1 x 10 Next?       Seated row 3 x 10 BTB 3 x 10 BTB 3 x 10 BTB 3 x 10 BTB 3 x 10 BTB 3 x 10 GTB 3 x 10 GTB 3 x 10 GTB 2 x 10 GTB          W  I  T 2 x 10 YTB  2 x 10 YTB  2 x 10 YTB 1 x 10 YTB  1 x 10 YTB  1 x 10 YTB                                    STANDING:                   Pec str     -- -- -- -- -- -- -- 3 x 15" Next? Next?     IR stretch with strap 3 x 15" 3 x 15" -- 3 x 15" 3 x 15" 3 x 15" 3 x 15" 3 x 15" 3 x 15" Resume  3 x 15" 3 x 15" 1 x 10 Next?                                                                                     Initials CRYSTAL THOMAS MA               Annika receives moist hot pack x 10 minutes to bilateral shoulders to decrease pain and muscle guarding.     Home Exercises and Patient Education Provided     Education provided:   - HEP  - keeping exercises in a pain free range    Written Home Exercises Provided: yes.  Exercises were reviewed and Annika was able to demonstrate them prior to the end of the session.  Annika demonstrated fair  understanding of the education provided.      See EMR under Patient Instructions for exercises provided 6/30/2020.      Assessment     Annika continues to progress with shoulder strengthening exercises and noted to have improved AROM in bilateral shoulders.  She is able to progress exercises on this date without increased symptoms prior to leaving clinic.       Annika is progressing slowly towards her goals.   Pt prognosis is Good.     Pt will continue to benefit from skilled outpatient physical therapy to address the deficits listed in the problem list box on initial evaluation, provide pt/family education and to maximize pt's level of " independence in the home and community environment.     Pt's spiritual, cultural and educational needs considered and pt agreeable to plan of care and goals.    Anticipated Barriers for therapy: transportation, pain tolerance, apprehension       Goals:  Short Term Goals:  4 weeks  1. Patient will be compliant with HEP to promote the independent management of current diagnosis.  met  2. Patient will increase left shoulder flexion to 90 degrees for function of dressing. met  3. Patient will increase left shoulder functional ER to reach C2 for function of grooming. met  4. Patient will report a decrease in complaints of left shoulder pain to 6/10 during performance of ADL's for independence of self care activities.  In progress, not met        Long Term Goals:  8 weeks  1. Patient will increase left shoulder strength to 4-/5 to improve tolerance to light house work. In progress, not met  2. Patient will increase left shoulder flexion to 135 degrees in order to place dishes in overhead cabinets independently for self care independence.  met  3. Patient will increase left shoulder functional IR to reach T7 for function of dressing. In progress, not met  4. Patient will increase scapular stabilization strength to 4/5 to improve tolerance to normal lifting. In progress, not met      Plan     Continue with planned PT POC.  Progress WIT resistance to red theraband next visit.     Marino López, PT, DPT

## 2020-09-09 NOTE — PROGRESS NOTES
"  Occupational Therapy Daily Treatment Note     Date: 9/9/2020  Name: Annika Mac  Clinic Number: [id number]    Therapy Diagnosis:   Encounter Diagnoses   Name Primary?    Pain in both hands     Decreased range of motion of left thumb      Physician: Marky Hagen MD    Physician Orders: Eval & treat  Medical Diagnosis: B Hand Pain; CMC arthritis & De Quervain's  Surgical Procedure and Date: none  Evaluation Date: 07/09/20  Insurance Authorization Period Expiration: 12/31/20  Plan of Care Certification Period: 07/09/20 to 08/19/20  Date of Return to MD: TBD    Visit # / Visits authorized: 13 / 20     Time In: 11:45 am  Time Out:  12:30 pm   Total Billable Time:  45 minutes (P, 1MT, 1TE)    Precautions:  Standard and Diabetes      Subjective     Pt reports: "I would like to try the custom thumb splint you were mentioning last time I saw you".   Response to previous treatment: No adverse reactions noted or reported  Functional change: able to use both wrist/hands in light self-care tasks, but with pain    Pain: R=5/10;  L= 710 pre-tx   Location: bilateral wrists/hands      Objective     Annika received supervised modality after being cleared for contraindications, for 10 minutes, as follows:  -Paraffin w/ MHP to B wrist/hands, pre-tx to decrease pain & increase tissue extensibility    Annika received the following manual therapy techniques for 10 minutes:   -Edema mgmt w/ lymphatic drainage technique;  Deep STM, including MFR, CFM, TPR & scar mgmt to B forearm/wrist/hands, using hand techniques and IASTM tools to increase blood flow/circulation, improve soft tissue pliability and decrease pain.    Annika received therapeutic exercises for 25 minutes direct care including:  B wrist/hand    Dexterciser  3 min   Isospheres 3 min   9 Peg  1 trial each hand   PHG 3/10 reps, setting 2       T-putty: Yellow/green   -grasp/manipulation 3 reps each   -dowel digs 3 min       Home Exercises and Education Provided     Education " provided:   - Reviewed HEP  -Progress towards goals    Written Home Exercises Provided: Patient instructed to cont prior HEP.  Exercises were reviewed and Annika was able to demonstrate them prior to the end of the session.  Annika demonstrated good  understanding of the HEP provided.     See EMR under Patient Instructions for exercises provided prior visit.        Assessment     Pain level continues to be high for her L hand/thumb, but is staying the same for her R hand.  She may benefit from a custom CMC orthosis to address her pain and improve her functional use of her L hand in ADL/IADL's.  Patient was able to perform all above listed therapeutic exercises without increased pain or difficulty today.      Annika is progressing well towards her goals and there are no updates to goals at this time. Pt prognosis is Good.     Pt will continue to benefit from skilled outpatient occupational therapy to address the deficits listed in the problem list on initial evaluation provide pt/family education and to maximize pt's level of independence in the home and community environment.     Anticipated barriers to occupational therapy: Arthritis, Diabetes    Pt's spiritual, cultural and educational needs considered and pt agreeable to plan of care and goals.    Goals:  Short Term Goals: (in 2 weeks)  1) Patient will be independent in HEP-met  2) Decrease pain in B hands/thumbs to no more than 6-7/10 worst in ADL/IADL's-met  3) Increase AROM in L thumb Rad/Palm ABD by 3-5 degrees for improved functioning in ADL/IADL's-previously met  4) Increase B  strength by 3-5 psi for increased functional use-met  5) Decrease edema in L wrist/thumb to trace-met      Long Term Goals: (in 6 weeks)  1) Decrease pain in B hands/thumbs to no more than 2-3/10 worst in ADL/IADL's-part met/ongoing  2) Increase AROM of B hands/thumbs to WFL for increased functioning in ADL/IADL's-met  3) Increase strength in B  by 20% of initial measures for  improved functioning in ADL/IADL's-ongoing  4) Increased functioning in ADL/IADL's, as evidenced by a FOTO impairment rating of no more than 43%-ongoing  5) Decrease edema in B wrist/hand to trace or none-met    Plan     Updated Certification Period:  08/27/20 to 09/23/20     Recommended Treatment Plan: 2 times per week for 4 weeks: Manual Therapy, Moist Heat/ Ice, Paraffin, Patient Education, Self Care, Therapeutic Activites, Therapeutic Exercise and Ultrasound         Other Recommendations: progress strengthening, as tolerated         Pratik Veloz, OT

## 2020-09-11 ENCOUNTER — CLINICAL SUPPORT (OUTPATIENT)
Dept: REHABILITATION | Facility: HOSPITAL | Age: 70
End: 2020-09-11
Payer: MEDICARE

## 2020-09-11 DIAGNOSIS — M79.642 PAIN IN BOTH HANDS: ICD-10-CM

## 2020-09-11 DIAGNOSIS — M79.641 PAIN IN BOTH HANDS: ICD-10-CM

## 2020-09-11 DIAGNOSIS — M25.642 DECREASED RANGE OF MOTION OF LEFT THUMB: ICD-10-CM

## 2020-09-11 PROCEDURE — 97018 PARAFFIN BATH THERAPY: CPT | Mod: PO

## 2020-09-11 PROCEDURE — L3913 HFO W/O JOINTS CF: HCPCS | Mod: PO

## 2020-09-11 PROCEDURE — 97140 MANUAL THERAPY 1/> REGIONS: CPT | Mod: PO

## 2020-09-11 PROCEDURE — 97760 ORTHOTIC MGMT&TRAING 1ST ENC: CPT | Mod: PO

## 2020-09-11 NOTE — PROGRESS NOTES
"  Occupational Therapy Daily Treatment Note     Date: 9/11/2020  Name: Annika Mac  Clinic Number: [id number]    Therapy Diagnosis:   Encounter Diagnoses   Name Primary?    Pain in both hands     Decreased range of motion of left thumb      Physician: Marky Hagen MD    Physician Orders: Eval & treat  Medical Diagnosis: B Hand Pain; CMC arthritis & De Quervain's  Surgical Procedure and Date: none  Evaluation Date: 07/09/20  Insurance Authorization Period Expiration: 12/31/20  Plan of Care Certification Period: 07/09/20 to 08/19/20  Date of Return to MD: TBD    Visit # / Visits authorized: 14 / 20     Time In: 11:00 am  Time Out:  12:15 pm   Total Billable Time:  75 minutes (P, 1MT, 1TE)    Precautions:  Standard and Diabetes      Subjective     Pt reports: "I don't feel the pain when I pinch with this brace you made for me".   Response to previous treatment: No adverse reactions noted or reported  Functional change: able to use both wrist/hands in light self-care tasks, but with pain    Pain: R=4/10;  L= 6-7/10 pre-tx   Location: bilateral wrists/hands      Objective     Annika received supervised modality after being cleared for contraindications, for 10 minutes, as follows:  -Paraffin w/ MHP to B wrist/hands, pre-tx to decrease pain & increase tissue extensibility    Annika received the following manual therapy techniques for 10 minutes:   -Edema mgmt w/ lymphatic drainage technique;  Deep STM, including MFR, CFM, TPR & scar mgmt to B forearm/wrist/hands, using hand techniques and IASTM tools to increase blood flow/circulation, improve soft tissue pliability and decrease pain.    Annika participated in Orthotic fitting and training for 45 minutes including the following:  A custom hand-based L CMC radial subluxation with MP stabilization orthosis () was fabricated today to address L CMC-MP instability in patient's L thumb. Good fit and positioning was achieved upon completion of fabrication.  Patient was " provided with instrut on purpose of orthosis, proper donning/doffing, wear/care schedule and precautions to monitor.  Patient verbalized understanding of all instruction provided.    Annika received therapeutic exercises for 10 minutes direct care including:  B wrist/hand    Dexterciser  3 min   Isospheres 3 min       Home Exercises and Education Provided     Education provided:   - Updated HEP to include Orthotic program  -Progress towards goals    Written Home Exercises Provided: Patient instructed to cont prior HEP.  Exercises were reviewed and Annika was able to demonstrate them prior to the end of the session.  Annika demonstrated good  understanding of the HEP provided.     See EMR under Patient Instructions for exercises provided prior visit.        Assessment     Patient with less pain with spontaneous pinch patterns with custom CMC orthosis donned.  She has good potential for improvement of CMC stabilization with use of custom orthosis and worn as directed.  Her overall functioning in ADL/IADL's will improve, as welll due to decreased pain with L hand/thumb use.       Annika is progressing well towards her goals and there are no updates to goals at this time. Pt prognosis is Good.     Pt will continue to benefit from skilled outpatient occupational therapy to address the deficits listed in the problem list on initial evaluation provide pt/family education and to maximize pt's level of independence in the home and community environment.     Anticipated barriers to occupational therapy: Arthritis, Diabetes    Pt's spiritual, cultural and educational needs considered and pt agreeable to plan of care and goals.    Goals:  Short Term Goals: (in 2 weeks)  1) Patient will be independent in HEP-met  2) Decrease pain in B hands/thumbs to no more than 6-7/10 worst in ADL/IADL's-met  3) Increase AROM in L thumb Rad/Palm ABD by 3-5 degrees for improved functioning in ADL/IADL's-previously met  4) Increase B  strength by  3-5 psi for increased functional use-met  5) Decrease edema in L wrist/thumb to trace-met      Long Term Goals: (in 6 weeks)  1) Decrease pain in B hands/thumbs to no more than 2-3/10 worst in ADL/IADL's-part met/ongoing  2) Increase AROM of B hands/thumbs to WFL for increased functioning in ADL/IADL's-met  3) Increase strength in B  by 20% of initial measures for improved functioning in ADL/IADL's-ongoing  4) Increased functioning in ADL/IADL's, as evidenced by a FOTO impairment rating of no more than 43%-ongoing  5) Decrease edema in B wrist/hand to trace or none-met    Plan     Updated Certification Period:  08/27/20 to 09/23/20     Recommended Treatment Plan: 2 times per week for 4 weeks: Manual Therapy, Moist Heat/ Ice, Paraffin, Patient Education, Self Care, Therapeutic Activites, Therapeutic Exercise and Ultrasound         Other Recommendations: progress strengthening, as tolerated         Pratik Veloz, OT

## 2020-09-14 ENCOUNTER — TELEPHONE (OUTPATIENT)
Dept: ENDOCRINOLOGY | Facility: CLINIC | Age: 70
End: 2020-09-14

## 2020-09-15 ENCOUNTER — TELEPHONE (OUTPATIENT)
Dept: NEPHROLOGY | Facility: CLINIC | Age: 70
End: 2020-09-15

## 2020-09-15 ENCOUNTER — CLINICAL SUPPORT (OUTPATIENT)
Dept: REHABILITATION | Facility: HOSPITAL | Age: 70
End: 2020-09-15
Payer: MEDICARE

## 2020-09-15 DIAGNOSIS — M62.81 MUSCLE WEAKNESS OF LEFT ARM: ICD-10-CM

## 2020-09-15 DIAGNOSIS — M79.641 PAIN IN BOTH HANDS: ICD-10-CM

## 2020-09-15 DIAGNOSIS — M25.612 DECREASED ROM OF LEFT SHOULDER: ICD-10-CM

## 2020-09-15 DIAGNOSIS — M79.642 PAIN IN BOTH HANDS: ICD-10-CM

## 2020-09-15 DIAGNOSIS — M25.642 DECREASED RANGE OF MOTION OF LEFT THUMB: ICD-10-CM

## 2020-09-15 PROCEDURE — 97110 THERAPEUTIC EXERCISES: CPT | Mod: PO

## 2020-09-15 PROCEDURE — 97018 PARAFFIN BATH THERAPY: CPT | Mod: PO,59

## 2020-09-15 PROCEDURE — 97140 MANUAL THERAPY 1/> REGIONS: CPT | Mod: PO

## 2020-09-15 NOTE — PROGRESS NOTES
Physical Therapy Daily Treatment Note     Name: Annika Mac  Clinic Number: [id number]    Therapy Diagnosis:   Encounter Diagnoses   Name Primary?    Decreased ROM of left shoulder     Muscle weakness of left arm      Physician: IGLESIA Garcia MD    Visit Date: 9/15/2020    Physician Orders: PT Eval and Treat   Medical Diagnosis from Referral: M25.512,G89.29 (ICD-10-CM) - Chronic left shoulder pain   Evaluation Date: 6/30/2020  Authorization Period Expiration: 06/17/2021   Plan of Care Expiration: 8/30/2020  Visit # / Visits authorized: 16/ 20     Time In: 10:30 am  Time Out: 11:13 am  Total Appointment Time (timed & untimed codes): 30 minutes     Precautions: Standard and Fall      Subjective     Pt reports:  Bilateral shoulder pain with right > left.    She was compliant with home exercise program.  Response to previous treatment: slight decrease in pain  Functional change: improved AROM    Pain: 5/10   Location: left shoulder      Objective     Annika received therapeutic exercises to develop strength, ROM, flexibility and posture for 30 minutes 1:1 with PT including:     Date  9/15/2020 9/9/2020 9/2/2020 8/27/2020 8/25/2020 8/18/2020 8/13/2020 8/11/2020 8/6/2020 8/4/2020 07/29/2020 7/27/2020 7/22/2020 7/9/2020 7/7/2020 6/30/2020   VISIT 16/20 15/20 14/20 13/20 12/20 11/20 10/20 9/20 8/20 7/20 6/20 5/20 4/20 3/20 2/20 1/1   POC EXP. DATE 10/27/2020 10/27/2020 10/27/2020 10/27/2020 8/30/2020 8/30/2020 8/30/2020 8/30/2020 8/30/2020 8/30/2020 8/30/2020 8/30/2020 8/30/2020 8/30/2020 8/30/2020 8/30/2020   VISIT AMOUNT  MEDICARE TOTAL 60.64  1235.04 60.64  1174.40 60.64  1113.76 60.64  1053.12 60.64  992.48 60.64  931.84 90.96  871.20 90.96  780.24 90.96  689.28   598.32 90.96 90.96  416.40 60.64  325.44 60.64  264.80 90.96  204.16 113.20   FACE-TO-FACE 10/2/2020 10/2/2020 10/2/2020 9/4/2020 9/4/2020 9/4/2020 9/4/2020 9/4/2020 9/4/2020 9/4/2020 7/30/2020 7/30/2020 7/30/2020 7/30/2020 7/30/2020 7/30/2020   FOTO        "   -- -- 5/5 4/5 3/5 2/5 1/5                        Pulley Flexion  2' 2' 2' 2' 2' 2' 2' 2' 2' -- Not today  2' 2' 2' 2' next   TABLE:                    Scapular retractions 30 x 3" 30 x 3" 30 x 3" 30 x 3" 30 x 3" 20 x 3" 20 x 3" 20 x 3" 20 x 3" -- 20 x 3" 20 x 3" 20 x 3" 15 x 3" 15 x 3" 10 x 3"   Posterior shoulder roll 3 x 10 3 x 10 3 x 10 3 x 10 3 x 10 2 x 10 2 x 10  2 x 10  2 x 10 2 x 10 2 x 10  2 x 10 2 x 10 1 x 15 1 x 15 next   Wand flexion Seated   3 x 10 x 3# Seated   3 x 10 x 3# Seated   3 x 10 x 2# Seated  3 x 10 x 2# Seated  3 x 10 x 2# Seated  3 x 10 x 1# 3 x 10 x 1# bar 2 x 10 x 1# bar  2 x 10 x 1# bar 2 x 10 x 1#bar 2 x 10  2 x 10 2 x 10 1 x 15 1 x 15 next   Wand ABD Seated   3 x 10 x 3# Seated  3 x 10 x 3# Seated  3 x 10 x 2# Seated  3 x 10 x 2# Seated  3 x 10 x 2# Seated  3 x 10 x 1# 3 x 10 x 1# bar 2 x 10 x 1# bar 2 x 10 x 1# bar 2 x 10 x 1# bar 2 x 10  2 x 10 2 x 10 1 x 15 1 x 15    Shoulder:  - Flexion  - Extension  - IR  - ER Active   3 x 10 BTB  3 x 10 BTB  3 x 10 BTB  3 x 10 BTB Active   3 x 10 BTB  3 x 10 BTB  3 x 10 BTB  3 x 10 BTB Active   3 x 10 BTB  3 x 10 BTB  3 x 10 BTB  3 x 10 BTB Active   2 x 10 BTB  2 x 10 BTB  2 x 10 BTB  2 x 10 BTB Active   2 x 10 BTB  2 x 10 BTB  2 x 10 BTB  2 x 10 BTB Active   3 x 10 GTB  3 x 10 GTB  3 x 10 GTB  3 x 10 GTB Active   --  3 x 10 GTB  3 x 10 GTB  3 x 10 GTB Active   2 x 10 GTB  2 x 10 GTB  2 x 10 GTB  2 x 10 GTB Active  2 x 10 GTB  2 x 10 GTB  2 x 10 GTB  2 x 10 GTB Active  2 x 10 RTB  2 x 10 RTB  2 x 10 RTB 2 x 10 RTB Active  1 x 15 RTB  1 x 15 RTB  1 x 15 RTB  1 x 15 RTB Active  1 x 15 RTB  1 x 15 RTB  1 x 15 RTB  1 x 15 RTB   10 x 3"  10 x 3"  10 x 3'  10 x 3'   10 x 3"  10 x 3"  10 x 3"  10 x 3"   10 x 3"  10 x 3"  10 x 3"  10 x 3"    1 x 10  1 x 10  1 x 10  1 x 10   Shldr AROM: seated  Flexion  scaption  abduction   2 x 10 x 2#  2 x 10 x 2#  2 x 10 x 2#   2 x 10 x 2#  2 x 10 x 2#  2 x 10 x 2#   2 x 10 x 2#  2 x 10 x 2#  2 x 10 x 2#   1 x 10 x " "2#  1 x 10 x 2#  1 x 10 x 2#   2 x 10 x 2#  2 x 10 x 2#  2 x 10 x 2#   2 x 10 x 2#  2 x 10 x 2#  2 x 10 x 2#   1 x 15 x 2#  2 x 10 x 1#  2 x 10 x 1#   1 x 15 x 2#  1 x 15 x 1#  1 x 15 x 1#   1 x 10 x 2#  1 x 10 x 1#  1 x 10 x 1#   2 x 10  2 x 10  2 x 10   1 x 15  1 x 15   1 x 15   1 x 10  1 x 10  1 x 10 Next?       Seated row 3 x 10 BTB 3 x 10 BTB 3 x 10 BTB 3 x 10 BTB 3 x 10 BTB 3 x 10 BTB 3 x 10 GTB 3 x 10 GTB 3 x 10 GTB 2 x 10 GTB          W  I  T 2 x 10 RTB  2 x 10 RTB  2 x 10 RTB 2 x 10 YTB  2 x 10 YTB  2 x 10 YTB 1 x 10 YTB  1 x 10 YTB  1 x 10 YTB                                     STANDING:                    Pec str      -- -- -- -- -- -- -- 3 x 15" Next? Next?     IR stretch with strap -- 3 x 15" 3 x 15" -- 3 x 15" 3 x 15" 3 x 15" 3 x 15" 3 x 15" 3 x 15" Resume  3 x 15" 3 x 15" 1 x 10 Next?                                                                                         Initials CRYSTAL ROJAS MA MA MA MA BJ BJ MA MA BJ  MA SB DG SB MA        Pt receives kinesiotaping for upper trap inhibition and space correction over AC joint.     Patient was screened for use of kinesiotape and was cleared for use.  1. Has the patient ever had a reaction to the adhesive in bandaids? No  2. Has the patient ever uses athletic/kinesiotape in the past? Yes  3. Is the patient hemodynamically impaired (PE, DVT, CHF, Kidney failure)? Yes  4. Can the PT/PTA apply the tape to your skin? Yes    Patient was instructed on duration to wear the tape, proper drying techniques for the tape, and to remove the tape if he/she had any issues with it.    Patient verbalized understanding of these instructions.        Annika receives moist hot pack x 10 minutes to bilateral shoulders to decrease pain and muscle guarding.     Home Exercises and Patient Education Provided     Education provided:   - HEP  - keeping exercises in a pain free range    Written Home Exercises Provided: yes.  Exercises were reviewed and Annika was able to demonstrate them " prior to the end of the session.  Annika demonstrated fair  understanding of the education provided.      See EMR under Patient Instructions for exercises provided 6/30/2020.      Assessment     Annika has thumb splint on left hand causing some difficulty holding thera-band during exercises, but is able to perform today's progression of exercises without increase in symptoms prior to leaving clinic.  Pt continues to have consistent bilateral shoulder pain with tension in upper trap musculature.  She receives kinesiotaping to decrease pain and muscle guarding.         Annika is progressing slowly towards her goals.   Pt prognosis is Good.     Pt will continue to benefit from skilled outpatient physical therapy to address the deficits listed in the problem list box on initial evaluation, provide pt/family education and to maximize pt's level of independence in the home and community environment.     Pt's spiritual, cultural and educational needs considered and pt agreeable to plan of care and goals.    Anticipated Barriers for therapy: transportation, pain tolerance, apprehension       Goals:  Short Term Goals:  4 weeks  1. Patient will be compliant with HEP to promote the independent management of current diagnosis.  met  2. Patient will increase left shoulder flexion to 90 degrees for function of dressing. met  3. Patient will increase left shoulder functional ER to reach C2 for function of grooming. met  4. Patient will report a decrease in complaints of left shoulder pain to 6/10 during performance of ADL's for independence of self care activities.  In progress, not met        Long Term Goals:  8 weeks  1. Patient will increase left shoulder strength to 4-/5 to improve tolerance to light house work. In progress, not met  2. Patient will increase left shoulder flexion to 135 degrees in order to place dishes in overhead cabinets independently for self care independence.  met  3. Patient will increase left shoulder  functional IR to reach T7 for function of dressing. In progress, not met  4. Patient will increase scapular stabilization strength to 4/5 to improve tolerance to normal lifting. In progress, not met      Plan     Continue with planned PT POC.       Marino López, PT, DPT

## 2020-09-15 NOTE — PROGRESS NOTES
Occupational Therapy Daily Treatment Note     Date: 9/15/2020  Name: Annika Mac  [facility] Number: [id number]    Therapy Diagnosis:   Encounter Diagnoses   Name Primary?    Pain in both hands     Decreased range of motion of left thumb      Physician: Marky Hagen MD    Physician Orders: Eval & treat  Medical Diagnosis: B Hand Pain; CMC arthritis & De Quervain's  Surgical Procedure and Date: none  Evaluation Date: 07/09/20  Insurance Authorization Period Expiration: 12/31/20  Plan of Care Certification Period: 07/09/20 to 08/19/20  Date of Return to MD: TBD    Visit # / Visits authorized: 15 / 20     Time In: 11:15 am  Time Out:  12:00 pm   Total Billable Time:  45 minutes (P, 1MT, 1TE)    Precautions:  Standard and Diabetes      Subjective     Pt reports: she had some discomfort at both sides of the wrist (radial-ulnar) and dorsum of the thumb IP with wearing the custom orthosis. Discomfort was resolved after adjustments were made to the orthosis  Response to previous treatment: No adverse reactions noted or reported  Functional change: able to use both wrist/hands in light self-care tasks, but with pain    Pain: R=4-5/10;  L= 5-6/10 pre-tx   Location: bilateral wrists/hands      Objective     Annika received supervised modality after being cleared for contraindications, for 10 minutes, as follows:  -Paraffin w/ MHP to B wrist/hands, pre-tx to decrease pain & increase tissue extensibility    Annika received the following manual therapy techniques for 10 minutes:   -Edema mgmt w/ lymphatic drainage technique;  Deep STM, including MFR, CFM, TPR & scar mgmt to B forearm/wrist/hands, using hand techniques and IASTM tools to increase blood flow/circulation, improve soft tissue pliability and decrease pain.    Annika participated in Orthotic fitting and training for 10 minutes including the following:  Adjusted custom hand-based L CMC radial subluxation with MP stabilization orthosis to improve comfort while wearing. Good  fit and positioning was achieved upon completion of fabrication.  Patient was provided with instrut on purpose of orthosis, proper donning/doffing, wear/care schedule and precautions to monitor.  Patient verbalized understanding of all instruction provided.    Annika received therapeutic exercises for 15 minutes direct care including:  B wrist/hand    Dexterciser  3 min   Isospheres 3 min   9 Peg  21 trial each hand   PHG 3/10 reps, setting 2       Home Exercises and Education Provided     Education provided:   - Updated HEP to include Orthotic program  -Progress towards goals    Written Home Exercises Provided: Patient instructed to cont prior HEP.  Exercises were reviewed and Annika was able to demonstrate them prior to the end of the session.  Annika demonstrated good  understanding of the HEP provided.     See EMR under Patient Instructions for exercises provided prior visit.        Assessment     Patient continues to have less pain in B wrist/hands and it appears that L CMC orthosis is helping to decrease L thumb pain.  After adjustments were made to the custom CMC orthosis, patient no longer had discomfort in any previously mentioned areas.  Functioning in ADL/IADL's is expected to improve with continued use of L thumb orthosis.        Annika is progressing well towards her goals and there are no updates to goals at this time. Pt prognosis is Good.     Pt will continue to benefit from skilled outpatient occupational therapy to address the deficits listed in the problem list on initial evaluation provide pt/family education and to maximize pt's level of independence in the home and community environment.     Anticipated barriers to occupational therapy: Arthritis, Diabetes    Pt's spiritual, cultural and educational needs considered and pt agreeable to plan of care and goals.    Goals:  Short Term Goals: (in 2 weeks)  1) Patient will be independent in HEP-met  2) Decrease pain in B hands/thumbs to no more than 6-7/10  worst in ADL/IADL's-met  3) Increase AROM in L thumb Rad/Palm ABD by 3-5 degrees for improved functioning in ADL/IADL's-previously met  4) Increase B  strength by 3-5 psi for increased functional use-met  5) Decrease edema in L wrist/thumb to trace-met      Long Term Goals: (in 6 weeks)  1) Decrease pain in B hands/thumbs to no more than 2-3/10 worst in ADL/IADL's-part met/ongoing  2) Increase AROM of B hands/thumbs to WFL for increased functioning in ADL/IADL's-met  3) Increase strength in B  by 20% of initial measures for improved functioning in ADL/IADL's-ongoing  4) Increased functioning in ADL/IADL's, as evidenced by a FOTO impairment rating of no more than 43%-ongoing  5) Decrease edema in B wrist/hand to trace or none-met    Plan     Updated Certification Period:  08/27/20 to 09/23/20     Recommended Treatment Plan: 2 times per week for 4 weeks: Manual Therapy, Moist Heat/ Ice, Paraffin, Patient Education, Self Care, Therapeutic Activites, Therapeutic Exercise and Ultrasound         Other Recommendations: progress strengthening, as tolerated         Pratik Veloz, OT

## 2020-09-16 ENCOUNTER — IMMUNIZATION (OUTPATIENT)
Dept: INTERNAL MEDICINE | Facility: CLINIC | Age: 70
End: 2020-09-16
Payer: MEDICARE

## 2020-09-16 ENCOUNTER — OFFICE VISIT (OUTPATIENT)
Dept: INTERNAL MEDICINE | Facility: CLINIC | Age: 70
End: 2020-09-16
Payer: MEDICARE

## 2020-09-16 VITALS
HEART RATE: 77 BPM | SYSTOLIC BLOOD PRESSURE: 120 MMHG | DIASTOLIC BLOOD PRESSURE: 80 MMHG | OXYGEN SATURATION: 98 % | HEIGHT: 65 IN | WEIGHT: 293 LBS | BODY MASS INDEX: 48.82 KG/M2

## 2020-09-16 DIAGNOSIS — Z12.31 ENCOUNTER FOR SCREENING MAMMOGRAM FOR BREAST CANCER: ICD-10-CM

## 2020-09-16 DIAGNOSIS — R21 RASH AND NONSPECIFIC SKIN ERUPTION: ICD-10-CM

## 2020-09-16 DIAGNOSIS — L65.9 HAIR LOSS: ICD-10-CM

## 2020-09-16 DIAGNOSIS — I10 ESSENTIAL HYPERTENSION: Primary | ICD-10-CM

## 2020-09-16 DIAGNOSIS — Z79.4 TYPE 2 DIABETES MELLITUS WITH DIABETIC POLYNEUROPATHY, WITH LONG-TERM CURRENT USE OF INSULIN: ICD-10-CM

## 2020-09-16 DIAGNOSIS — E66.01 MORBID OBESITY WITH BMI OF 50.0-59.9, ADULT: ICD-10-CM

## 2020-09-16 DIAGNOSIS — M48.061 SPINAL STENOSIS OF LUMBAR REGION, UNSPECIFIED WHETHER NEUROGENIC CLAUDICATION PRESENT: ICD-10-CM

## 2020-09-16 DIAGNOSIS — E11.42 TYPE 2 DIABETES MELLITUS WITH DIABETIC POLYNEUROPATHY, WITH LONG-TERM CURRENT USE OF INSULIN: ICD-10-CM

## 2020-09-16 DIAGNOSIS — Z86.73 HISTORY OF STROKE: ICD-10-CM

## 2020-09-16 DIAGNOSIS — N18.9 CHRONIC KIDNEY DISEASE, UNSPECIFIED CKD STAGE: ICD-10-CM

## 2020-09-16 PROCEDURE — 99999 PR PBB SHADOW E&M-EST. PATIENT-LVL V: CPT | Mod: PBBFAC,,, | Performed by: INTERNAL MEDICINE

## 2020-09-16 PROCEDURE — 99999 PR PBB SHADOW E&M-EST. PATIENT-LVL V: ICD-10-PCS | Mod: PBBFAC,,, | Performed by: INTERNAL MEDICINE

## 2020-09-16 PROCEDURE — 99214 OFFICE O/P EST MOD 30 MIN: CPT | Mod: S$PBB,,, | Performed by: INTERNAL MEDICINE

## 2020-09-16 PROCEDURE — 99215 OFFICE O/P EST HI 40 MIN: CPT | Mod: PBBFAC,25 | Performed by: INTERNAL MEDICINE

## 2020-09-16 PROCEDURE — 90694 VACC AIIV4 NO PRSRV 0.5ML IM: CPT | Mod: PBBFAC

## 2020-09-16 PROCEDURE — 99214 PR OFFICE/OUTPT VISIT, EST, LEVL IV, 30-39 MIN: ICD-10-PCS | Mod: S$PBB,,, | Performed by: INTERNAL MEDICINE

## 2020-09-16 PROCEDURE — G0008 ADMIN INFLUENZA VIRUS VAC: HCPCS | Mod: PBBFAC

## 2020-09-16 NOTE — PROGRESS NOTES
"Subjective:       Patient ID: Annika Mac is a 69 y.o. female.    Chief Complaint: Annual Exam   This is a 69-year-old who presents today for check up.  She reports recent change to a different endocrinologist since her nurse practitioner left she has been working to get her diabetes under better control and is on high-dose insulin is reports takes 110 in the morning 85 at lunch and 65 in the evening her A1c at her last check was improved and she has also been trying to do a bit of activity as best she can she has had issues after a fall with her left shoulder and left hand and is following with orthopedist undergoing therapy and wears a splint as well.  She follows with nephrology reports her last appointment was moved she would like to get a flu shot while she is here today and mammogram order placed for scheduling when she is due later this year.  She did follow-up with her podiatrist as well no further toenail discomfort her nail was removed    HPI  Review of Systems   Constitutional: Negative for fever.   Respiratory: Negative for cough, shortness of breath and wheezing.    Cardiovascular: Negative for chest pain and palpitations.   Gastrointestinal: Negative for abdominal pain and constipation.   Musculoskeletal: Positive for arthralgias.        Toenail improved    Neurological: Negative for dizziness.       Objective:     Blood pressure 120/80, pulse 77, height 5' 5" (1.651 m), weight (!) 144.7 kg (319 lb 0.1 oz), SpO2 98 %.    Physical Exam  Constitutional:       General: She is not in acute distress.     Comments: Seated in wheelchair    HENT:      Head: Normocephalic.   Eyes:      General: No scleral icterus.  Neck:      Musculoskeletal: Neck supple.   Cardiovascular:      Rate and Rhythm: Normal rate and regular rhythm.      Heart sounds: Murmur present. No friction rub. No gallop.       Comments: Breast : normal no masses or tenderness   Pulmonary:      Effort: Pulmonary effort is normal. No respiratory " distress.      Breath sounds: Normal breath sounds.   Abdominal:      General: Bowel sounds are normal.      Palpations: Abdomen is soft. There is no mass.      Tenderness: There is no abdominal tenderness.   Genitourinary:     Comments: Rectal irritaion/no bleeding   Musculoskeletal:      Comments: Left shoulder proimance improveed rom  Left hand in splint     Right foot toenail defomrmity no sign of infection    Skin:     Findings: No erythema.      Comments: seb dermatitis  Dry skin dermatitis  Hair thinning    Neurological:      Mental Status: She is alert.         Assessment:       1. Essential hypertension    2. Type 2 diabetes mellitus with diabetic polyneuropathy, with long-term current use of insulin    3. Encounter for screening mammogram for breast cancer    4. Rash and nonspecific skin eruption    5. History of stroke    6. Hair loss    7. Morbid obesity with BMI of 50.0-59.9, adult    8. Spinal stenosis of lumbar region, unspecified whether neurogenic claudication present    9. Chronic kidney disease, unspecified CKD stage        Plan:       Annika was seen today for annual exam.    Diagnoses and all orders for this visit:    Essential hypertension  Blood pressure acceptable chronic kidney disease she continues to follow with her nephrologist    Type 2 diabetes mellitus with diabetic polyneuropathy, with long-term current use of insulin  History of she has had difficulty with diabetic control currently on Ozempic and remains on high-dose insulin followed by endocrinology she has an upcoming appointment planned    Encounter for screening mammogram for breast cancer  -     Mammo Digital Screening Bilat w/ [facility]; Future    Rash and nonspecific skin eruption  Trouble with dermatitis and hair loss would like to get back in with Dermatology referral placed  -     Ambulatory referral/consult to Dermatology; Future    History of stroke    Hair loss  -     Ambulatory referral/consult to Dermatology;  Future    Morbid obesity with BMI of 50.0-59.9, adult  She is trying to be more active and has lost some weight with his epic    Spinal stenosis of lumbar region, unspecified whether neurogenic claudication present  History of and osteoarthritis is left shoulder injury and left hand injury she is following with orthopedist and physical medicine undergoing physical therapy    Hemorrhoid that time we discussed avoidance of constipation she uses a cream on occasion she will call if she would like colon rectal appointment if needed    Chronic kidney disease, unspecified CKD stage    Flu shot recommended schedule annual mammogram when due    Follow up 4 months

## 2020-09-17 ENCOUNTER — CLINICAL SUPPORT (OUTPATIENT)
Dept: REHABILITATION | Facility: HOSPITAL | Age: 70
End: 2020-09-17
Payer: MEDICARE

## 2020-09-17 DIAGNOSIS — M25.642 DECREASED RANGE OF MOTION OF LEFT THUMB: ICD-10-CM

## 2020-09-17 DIAGNOSIS — M79.641 PAIN IN BOTH HANDS: ICD-10-CM

## 2020-09-17 DIAGNOSIS — M79.642 PAIN IN BOTH HANDS: ICD-10-CM

## 2020-09-17 DIAGNOSIS — M62.81 MUSCLE WEAKNESS OF LEFT ARM: ICD-10-CM

## 2020-09-17 DIAGNOSIS — M25.612 DECREASED ROM OF LEFT SHOULDER: ICD-10-CM

## 2020-09-17 PROCEDURE — 97110 THERAPEUTIC EXERCISES: CPT | Mod: PO

## 2020-09-17 PROCEDURE — 97140 MANUAL THERAPY 1/> REGIONS: CPT | Mod: PO

## 2020-09-17 PROCEDURE — 97018 PARAFFIN BATH THERAPY: CPT | Mod: PO,59

## 2020-09-17 NOTE — PROGRESS NOTES
Physical Therapy Daily Treatment Note     Name: Annika Mac  Clinic Number: [id number]    Therapy Diagnosis:   Encounter Diagnoses   Name Primary?    Decreased ROM of left shoulder     Muscle weakness of left arm      Physician: IGLESIA Garcia MD    Visit Date: 9/17/2020    Physician Orders: PT Eval and Treat   Medical Diagnosis from Referral: M25.512,G89.29 (ICD-10-CM) - Chronic left shoulder pain   Evaluation Date: 6/30/2020  Authorization Period Expiration: 06/17/2021   Plan of Care Expiration: 8/30/2020  Visit # / Visits authorized: 17/ 20     Time In: 11:15 am  Time Out: 11:45 am  Total Appointment Time (timed & untimed codes): 30 minutes     Precautions: Standard and Fall      Subjective     Pt reports:  Having slight decrease in shoulder pain this morning with left more painful than right.    She was compliant with home exercise program.  Response to previous treatment: slight decrease in pain  Functional change: improved AROM    Pain: 4/10   Location: left shoulder      Objective     Annika received therapeutic exercises to develop strength, ROM, flexibility and posture for 30 minutes 1:1 with PT including:     Date  9/17/2020 9/15/2020 9/9/2020 9/2/2020 8/27/2020 8/25/2020 8/18/2020 8/13/2020 8/11/2020 8/6/2020 8/4/2020 07/29/2020 7/27/2020 7/22/2020 7/9/2020 7/7/2020 6/30/2020   VISIT 17/20 16/20 15/20 14/20 13/20 12/20 11/20 10/20 9/20 8/20 7/20 6/20 5/20 4/20 3/20 2/20 1/1   POC EXP. DATE 10/27/2020 10/27/2020 10/27/2020 10/27/2020 10/27/2020 8/30/2020 8/30/2020 8/30/2020 8/30/2020 8/30/2020 8/30/2020 8/30/2020 8/30/2020 8/30/2020 8/30/2020 8/30/2020 8/30/2020   VISIT AMOUNT  MEDICARE TOTAL 60.64  1295.68 60.64  1235.04 60.64  1174.40 60.64  1113.76 60.64  1053.12 60.64  992.48 60.64  931.84 90.96  871.20 90.96  780.24 90.96  689.28   598.32 90.96 90.96  416.40 60.64  325.44 60.64  264.80 90.96  204.16 113.20   FACE-TO-FACE 10/2/2020 10/2/2020 10/2/2020 10/2/2020 9/4/2020 9/4/2020 9/4/2020 9/4/2020  "9/4/2020 9/4/2020 9/4/2020 7/30/2020 7/30/2020 7/30/2020 7/30/2020 7/30/2020 7/30/2020   FOTO           -- -- 5/5 4/5 3/5 2/5 1/5                         Pulley Flexion  2' 2' 2' 2' 2' 2' 2' 2' 2' 2' -- Not today  2' 2' 2' 2' next   TABLE:                     Scapular retractions 30 x 3" 30 x 3" 30 x 3" 30 x 3" 30 x 3" 30 x 3" 20 x 3" 20 x 3" 20 x 3" 20 x 3" -- 20 x 3" 20 x 3" 20 x 3" 15 x 3" 15 x 3" 10 x 3"   Posterior shoulder roll 3 x 10 3 x 10 3 x 10 3 x 10 3 x 10 3 x 10 2 x 10 2 x 10  2 x 10  2 x 10 2 x 10 2 x 10  2 x 10 2 x 10 1 x 15 1 x 15 next   Wand flexion Seated   3 x 10 x 3# Seated   3 x 10 x 3# Seated   3 x 10 x 3# Seated   3 x 10 x 2# Seated  3 x 10 x 2# Seated  3 x 10 x 2# Seated  3 x 10 x 1# 3 x 10 x 1# bar 2 x 10 x 1# bar  2 x 10 x 1# bar 2 x 10 x 1#bar 2 x 10  2 x 10 2 x 10 1 x 15 1 x 15 next   Wand ABD Seated   3 x 10 x 3# Seated   3 x 10 x 3# Seated  3 x 10 x 3# Seated  3 x 10 x 2# Seated  3 x 10 x 2# Seated  3 x 10 x 2# Seated  3 x 10 x 1# 3 x 10 x 1# bar 2 x 10 x 1# bar 2 x 10 x 1# bar 2 x 10 x 1# bar 2 x 10  2 x 10 2 x 10 1 x 15 1 x 15    Shoulder:  - Flexion  - Extension  - IR  - ER Active   3 x 10 BTB  ---  3 x 10 BTB  3 x 10 BTB Active   3 x 10 BTB  3 x 10 BTB  3 x 10 BTB  3 x 10 BTB Active   3 x 10 BTB  3 x 10 BTB  3 x 10 BTB  3 x 10 BTB Active   3 x 10 BTB  3 x 10 BTB  3 x 10 BTB  3 x 10 BTB Active   2 x 10 BTB  2 x 10 BTB  2 x 10 BTB  2 x 10 BTB Active   2 x 10 BTB  2 x 10 BTB  2 x 10 BTB  2 x 10 BTB Active   3 x 10 GTB  3 x 10 GTB  3 x 10 GTB  3 x 10 GTB Active   --  3 x 10 GTB  3 x 10 GTB  3 x 10 GTB Active   2 x 10 GTB  2 x 10 GTB  2 x 10 GTB  2 x 10 GTB Active  2 x 10 GTB  2 x 10 GTB  2 x 10 GTB  2 x 10 GTB Active  2 x 10 RTB  2 x 10 RTB  2 x 10 RTB 2 x 10 RTB Active  1 x 15 RTB  1 x 15 RTB  1 x 15 RTB  1 x 15 RTB Active  1 x 15 RTB  1 x 15 RTB  1 x 15 RTB  1 x 15 RTB   10 x 3"  10 x 3"  10 x 3'  10 x 3'   10 x 3"  10 x 3"  10 x 3"  10 x 3"   10 x 3"  10 x 3"  10 x 3"  10 x 3"    1 x " "10  1 x 10  1 x 10  1 x 10   Shldr AROM: seated  Flexion  scaption  abduction   2 x 10 x 2#  2 x 10 x 2#  2 x 10 x 2#   2 x 10 x 2#  2 x 10 x 2#  2 x 10 x 2#   2 x 10 x 2#  2 x 10 x 2#  2 x 10 x 2#   2 x 10 x 2#  2 x 10 x 2#  2 x 10 x 2#   1 x 10 x 2#  1 x 10 x 2#  1 x 10 x 2#   2 x 10 x 2#  2 x 10 x 2#  2 x 10 x 2#   2 x 10 x 2#  2 x 10 x 2#  2 x 10 x 2#   1 x 15 x 2#  2 x 10 x 1#  2 x 10 x 1#   1 x 15 x 2#  1 x 15 x 1#  1 x 15 x 1#   1 x 10 x 2#  1 x 10 x 1#  1 x 10 x 1#   2 x 10  2 x 10  2 x 10   1 x 15  1 x 15   1 x 15   1 x 10  1 x 10  1 x 10 Next?       Seated row 3 x 10 BTB 3 x 10 BTB 3 x 10 BTB 3 x 10 BTB 3 x 10 BTB 3 x 10 BTB 3 x 10 BTB 3 x 10 GTB 3 x 10 GTB 3 x 10 GTB 2 x 10 GTB          W  I  T 2 x 10 RTB  2 x 10 RTB  2 x 10 RTB 2 x 10 RTB  2 x 10 RTB  2 x 10 RTB 2 x 10 YTB  2 x 10 YTB  2 x 10 YTB 1 x 10 YTB  1 x 10 YTB  1 x 10 YTB                                      STANDING:                     Pec str       -- -- -- -- -- -- -- 3 x 15" Next? Next?     IR stretch with strap -- -- 3 x 15" 3 x 15" -- 3 x 15" 3 x 15" 3 x 15" 3 x 15" 3 x 15" 3 x 15" Resume  3 x 15" 3 x 15" 1 x 10 Next?                                                                                             Initials CRYSTAL THOMAS MA         Annika receives moist hot pack x 10 minutes to bilateral shoulders to decrease pain and muscle guarding.     Home Exercises and Patient Education Provided     Education provided:   - HEP  - keeping exercises in a pain free range    Written Home Exercises Provided: yes.  Exercises were reviewed and Annika was able to demonstrate them prior to the end of the session.  Annika demonstrated fair  understanding of the education provided.      See EMR under Patient Instructions for exercises provided 6/30/2020.      Assessment     Annika requires verbal cues for proper form during wand abduction and continues to have difficulty performing resisted shoulder ER without substitution.  " She remains tender over upper trap and AC joint region.         Annika is progressing slowly towards her goals.   Pt prognosis is Good.     Pt will continue to benefit from skilled outpatient physical therapy to address the deficits listed in the problem list box on initial evaluation, provide pt/family education and to maximize pt's level of independence in the home and community environment.     Pt's spiritual, cultural and educational needs considered and pt agreeable to plan of care and goals.    Anticipated Barriers for therapy: transportation, pain tolerance, apprehension       Goals:  Short Term Goals:  4 weeks  1. Patient will be compliant with HEP to promote the independent management of current diagnosis.  met  2. Patient will increase left shoulder flexion to 90 degrees for function of dressing. met  3. Patient will increase left shoulder functional ER to reach C2 for function of grooming. met  4. Patient will report a decrease in complaints of left shoulder pain to 6/10 during performance of ADL's for independence of self care activities.  In progress, not met        Long Term Goals:  8 weeks  1. Patient will increase left shoulder strength to 4-/5 to improve tolerance to light house work. In progress, not met  2. Patient will increase left shoulder flexion to 135 degrees in order to place dishes in overhead cabinets independently for self care independence.  met  3. Patient will increase left shoulder functional IR to reach T7 for function of dressing. In progress, not met  4. Patient will increase scapular stabilization strength to 4/5 to improve tolerance to normal lifting. In progress, not met      Plan     Continue with planned PT POC.       Marino López, PT, DPT

## 2020-09-17 NOTE — PATIENT INSTRUCTIONS
[facility]  OCCUPATIONAL THERAPY  HOME EXERCISE PROGRAM     Complete the following strengthening program 1x/day.                       _    Pratik Veloz OTL  Occupational Therapist

## 2020-09-21 ENCOUNTER — OFFICE VISIT (OUTPATIENT)
Dept: NEPHROLOGY | Facility: CLINIC | Age: 70
End: 2020-09-21
Payer: MEDICARE

## 2020-09-21 VITALS
HEART RATE: 73 BPM | BODY MASS INDEX: 48.82 KG/M2 | HEIGHT: 65 IN | SYSTOLIC BLOOD PRESSURE: 142 MMHG | OXYGEN SATURATION: 99 % | WEIGHT: 293 LBS | DIASTOLIC BLOOD PRESSURE: 78 MMHG

## 2020-09-21 DIAGNOSIS — N18.9 ANEMIA OF RENAL DISEASE: ICD-10-CM

## 2020-09-21 DIAGNOSIS — D63.1 ANEMIA OF RENAL DISEASE: ICD-10-CM

## 2020-09-21 DIAGNOSIS — I10 ESSENTIAL HYPERTENSION: ICD-10-CM

## 2020-09-21 DIAGNOSIS — N18.30 CKD (CHRONIC KIDNEY DISEASE) STAGE 3, GFR 30-59 ML/MIN: ICD-10-CM

## 2020-09-21 DIAGNOSIS — N18.4 TYPE 2 DM WITH CKD STAGE 4 AND HYPERTENSION: Primary | ICD-10-CM

## 2020-09-21 DIAGNOSIS — I12.9 TYPE 2 DM WITH CKD STAGE 4 AND HYPERTENSION: Primary | ICD-10-CM

## 2020-09-21 DIAGNOSIS — E11.22 TYPE 2 DM WITH CKD STAGE 4 AND HYPERTENSION: Primary | ICD-10-CM

## 2020-09-21 PROCEDURE — 99999 PR PBB SHADOW E&M-EST. PATIENT-LVL V: CPT | Mod: PBBFAC,,, | Performed by: INTERNAL MEDICINE

## 2020-09-21 PROCEDURE — 99215 OFFICE O/P EST HI 40 MIN: CPT | Mod: PBBFAC | Performed by: INTERNAL MEDICINE

## 2020-09-21 PROCEDURE — 99214 OFFICE O/P EST MOD 30 MIN: CPT | Mod: S$PBB,,, | Performed by: INTERNAL MEDICINE

## 2020-09-21 PROCEDURE — 99214 PR OFFICE/OUTPT VISIT, EST, LEVL IV, 30-39 MIN: ICD-10-PCS | Mod: S$PBB,,, | Performed by: INTERNAL MEDICINE

## 2020-09-21 PROCEDURE — 99999 PR PBB SHADOW E&M-EST. PATIENT-LVL V: ICD-10-PCS | Mod: PBBFAC,,, | Performed by: INTERNAL MEDICINE

## 2020-09-21 NOTE — PROGRESS NOTES
Progress Note  Nephrology      Referring physician: Mamie Cotton MD    Reason for visit: CKD     SUBJECTIVE:   69 y.o. female  has a past medical history of Allergy, Anemia (7/27/2012), Anticoagulant long-term use, Arthritis, Asthma, CHF (congestive heart failure), CKD (chronic kidney disease) stage 3, GFR 30-59 ml/min (7/27/2012), Clotting disorder, Colon polyp, Deep vein thrombophlebitis of left leg (7/27/2012), Degenerative disc disease, Diabetic peripheral neuropathy associated with type 2 diabetes mellitus (7/27/2012), GERD (gastroesophageal reflux disease), HTN (hypertension) (7/27/2012), Hyperlipidemia (7/27/2012), Lead-induced gout of ankle or foot, Nonproliferative diabetic retinopathy of right eye (7/27/2012), Obesity, Obstructive sleep apnea (7/27/2012), Osteoporosis, Proliferative diabetic retinopathy of left eye (7/27/2012), Stroke (8/1/2003), Type 2 diabetes mellitus with diabetic polyneuropathy (7/27/2012), and Ulcer. who has been following up in renal clinic for ckd. Patient feels well today, no urinary or cardiac symptoms, no NSAIDs intake. Her renal function has been stable for many years now.        OBJECTIVE:     Vitals:    09/21/20 1136   BP: (!) 142/78   Pulse: 73          Physical Exam:  General: no distress, well nourished  HENT: PERRLA, Normal mouth nose and ears.  Neck: no JVD and thyroid not enlarged, symmetric, no tenderness/mass/nodules  Lungs: clear to auscultation bilaterally and normal respiratory effort  Cardiovascular: regular rate and rhythm, S1, S2 normal, no murmur, click, rub or gallop.   Abdomen: soft, non-tender non-distented; bowel sounds normal  Skin: No rashes or lesions  Musculoskeletal:trace edema in LE, no deformities.   Lymph Nodes: No cervical or supraclavicular adenopathy  Neurologic: Normal strength and tone. No focal numbness or weakness    Dialysis Access: Not applicable.        Medications:    Current Outpatient Medications:     albuterol (PROAIR HFA) 90  "mcg/actuation inhaler, Inhale 2 puffs into the lungs every 6 (six) hours as needed for Wheezing. Rescue, Disp: 18 g, Rfl: 6    allopurinoL (ZYLOPRIM) 300 MG tablet, TAKE 1 TABLET DAILY, Disp: 90 tablet, Rfl: 3    amLODIPine (NORVASC) 10 MG tablet, Take 1 tablet (10 mg total) by mouth once daily., Disp: 90 tablet, Rfl: 3    ammonium lactate 12 % Crea, Apply twice daily to affected parts both feet as needed., Disp: 140 g, Rfl: 11    aspirin 81 MG Chew, Take 1 tablet (81 mg total) by mouth once daily., Disp: , Rfl: 0    BD ULTRA-FINE KIKA PEN NEEDLE 32 gauge x 5/32" Ndle, To use 4 times daily, Disp: 200 each, Rfl: 20    blood sugar diagnostic Strp, 1 each by Misc.(Non-Drug; Combo Route) route 3 (three) times daily. Dx code  E11.42 . Contour Next, Disp: 200 each, Rfl: 12    blood-glucose meter kit, Use as instructed, true test/result meter, Disp: 1 each, Rfl: 0    blood-glucose meter kit, 1 each by Other route once daily. Use daily. Insurance proffered one touch  E11.42 (Patient taking differently: 1 each by Other route once daily. Contour next .  Insurance proffered one touch  E11.42), Disp: 1 each, Rfl: 0    chlorthalidone (HYGROTEN) 25 MG Tab, Take 1 tablet (25 mg total) by mouth once daily., Disp: 30 tablet, Rfl: 11    COLCRYS 0.6 mg tablet, TAKE 1 TABLET EVERY OTHER DAY (Patient taking differently: daily as needed. ), Disp: 45 tablet, Rfl: 3    DULoxetine (CYMBALTA) 30 MG capsule, TAKE 1 CAPSULE DAILY, Disp: 90 capsule, Rfl: 3    famotidine (PEPCID) 20 MG tablet, Take 1 tablet (20 mg total) by mouth 2 (two) times daily., Disp: 1 tablet, Rfl: 0    fexofenadine (ALLEGRA) 180 MG tablet, TAKE 1 TABLET BY MOUTH ONCE DAILY, Disp: 30 tablet, Rfl: 0    fluocinonide (LIDEX) 0.05 % external solution, AAA scalp qday - bid prn pruritus, Disp: 60 mL, Rfl: 3    glucagon, human recombinant, (GLUCAGON EMERGENCY KIT, HUMAN,) 1 mg SolR, Inject 1 mg into the muscle as needed., Disp: 1 each, Rfl: 3    hydrocortisone " (ANUSOL-HC) 2.5 % rectal cream, Place rectally 2 (two) times daily as needed for Hemorrhoids., Disp: 28 g, Rfl: 1    insulin NPH-insulin regular, 70/30, (NOVOLIN 70/30 U-100 INSULIN) 100 unit/mL (70-30) injection, 120 UNITS INTO THE SKIN WITH BREAKFAST, 110 UNITS WITH LUNCH, AND INJECT 90 UNITS WITH DINNER ; ., Disp: 30 mL, Rfl: 3    irbesartan (AVAPRO) 300 MG tablet, Take 1 tablet (300 mg total) by mouth every evening., Disp: 90 tablet, Rfl: 3    ketoconazole (NIZORAL) 2 % cream, Apply topically once daily., Disp: 1 Tube, Rfl: 2    labetalol (NORMODYNE) 300 MG tablet, Take 1 tablet (300 mg total) by mouth 2 (two) times daily., Disp: 180 tablet, Rfl: 3    lancets 31 gauge Misc, 1 lancet by Misc.(Non-Drug; Combo Route) route 4 (four) times daily. E11.42 . Prescribed one touch, Disp: 200 each, Rfl: 6    lancets 33 gauge Misc, 1 lancet by Misc.(Non-Drug; Combo Route) route 4 (four) times daily. Dx code E11.42, Disp: 200 each, Rfl: 12    multivitamin (THERAGRAN) per tablet, Take 1 tablet by mouth once daily., Disp: , Rfl:     nystatin (MYCOSTATIN) powder, Apply topically 3 (three) times daily as needed., Disp: 60 g, Rfl: 2    pregabalin (LYRICA) 150 MG capsule, Take 1 capsule (150 mg total) by mouth 2 (two) times daily., Disp: 180 capsule, Rfl: 4    semaglutide (OZEMPIC) 0.25 mg or 0.5 mg(2 mg/1.5 mL) PnIj, Inject 0.5 mg into the skin every 7 days., Disp: 12 Syringe, Rfl: 4    simvastatin (ZOCOR) 40 MG tablet, Take 1 tablet (40 mg total) by mouth every evening., Disp: 90 tablet, Rfl: 4    sodium bicarbonate 325 MG tablet, Take 2 tablets (650 mg total) by mouth 2 (two) times daily., Disp: 120 tablet, Rfl: 11    traMADol (ULTRAM) 50 mg tablet, Take 1 tablet (50 mg total) by mouth every 8 (eight) hours as needed., Disp: 90 tablet, Rfl: 3         Laboratory:  Lab Results   Component Value Date    CREATININE 1.8 (H) 09/08/2020       Prot/Creat Ratio, Ur   Date Value Ref Range Status   11/13/2019 Unable to  calculate 0.00 - 0.20 Final   02/13/2019 Unable to calculate 0.00 - 0.20 Final   05/16/2017 Unable to calculate 0.00 - 0.20 Final       Lab Results   Component Value Date     09/08/2020    K 4.9 09/08/2020    CO2 25 09/08/2020       last PTH   Lab Results   Component Value Date    .0 (H) 09/08/2020    CALCIUM 9.3 09/08/2020    CAION 1.27 02/28/2012    PHOS 3.6 09/08/2020       Lab Results   Component Value Date    HGB 9.2 (L) 09/08/2020        Lab Results   Component Value Date    HGBA1C 6.7 (H) 07/02/2020       Lab Results   Component Value Date    LDLCALC 69.8 09/08/2020       Other Labs were reviewed      ASSESSMENT/PLAN:       CKD III B/A1  -likely from DMII and HTN  -Cr baseline ~ 1.6 - 1.8 with eGFR ~ 33 ml/min stable for years  -UPCR none  -Renal US ckd, simple cysts    HTN  controlled    Anemia  -from ckd  -Hgb goal ~ 10  -Iron stores low    Secondary Hyperparathyroidism  -Phos/Ca acceptable  -PTH acceptable  -Vit Dwnl    Acid/Base  -Metabolic acidosis, improved on NaHco3            PLAN:  -Start Iron infusion  -Continue current BP meds regimen  -Avoid NSAIDs intake        RTC in 6 months with labs      JEANNE NORMAN MD  NEPHROLOGY ATTENDING

## 2020-09-22 ENCOUNTER — TELEPHONE (OUTPATIENT)
Dept: ENDOCRINOLOGY | Facility: CLINIC | Age: 70
End: 2020-09-22

## 2020-09-22 NOTE — TELEPHONE ENCOUNTER
Spoke with pt and notified that , we spoke with [place] rep  And  her order is in process right now. Usually it takes 24 to 42 hours for complete order. If [facility] need anything form us they will notify clinic to submit. Notified pt.

## 2020-09-22 NOTE — TELEPHONE ENCOUNTER
----- Message from Beryl Guzman MA sent at 9/22/2020  8:48 AM CDT -----  Contact: Annika   tel:  [phone]    ----- Message -----  From: Tequila Benton MA  Sent: 9/22/2020   8:40 AM CDT  To: Beryl Guzman MA    This  is not Dr Love patient.  ----- Message -----  From: Beryl Guzman MA  Sent: 9/21/2020   5:03 PM CDT  To: Tequila Benton MA      ----- Message -----  From: Lorraine Harris  Sent: 9/21/2020   4:43 PM CDT  To: [facility] Staff    Caller says:      Wants you to be aware that [email or web address] , [phone]  speak to Representative : Marino ,  will be calling you to discuss this pt.. so that  she can get the G6 Dexcom supplies.   Pls be on the lookout for the call , will be needing info to get this approved for pt.. Pt. Says she is on the Novalin 30 Insulin flex pen.   Asking if you will write the order for the 3 mos. Supply of this.   Trying to get this info to you asap.   Will be out of medication next week.  Asking if you can also contact them to give the ok for the medicaiton.    Seeing you on 10/7.  As per caller.

## 2020-09-24 ENCOUNTER — CLINICAL SUPPORT (OUTPATIENT)
Dept: REHABILITATION | Facility: HOSPITAL | Age: 70
End: 2020-09-24
Payer: MEDICARE

## 2020-09-24 DIAGNOSIS — M25.642 DECREASED RANGE OF MOTION OF LEFT THUMB: ICD-10-CM

## 2020-09-24 DIAGNOSIS — M79.642 PAIN IN BOTH HANDS: ICD-10-CM

## 2020-09-24 DIAGNOSIS — M62.81 MUSCLE WEAKNESS OF LEFT ARM: ICD-10-CM

## 2020-09-24 DIAGNOSIS — M79.641 PAIN IN BOTH HANDS: ICD-10-CM

## 2020-09-24 DIAGNOSIS — M25.612 DECREASED ROM OF LEFT SHOULDER: ICD-10-CM

## 2020-09-24 PROCEDURE — 97110 THERAPEUTIC EXERCISES: CPT | Mod: PO

## 2020-09-24 PROCEDURE — 97140 MANUAL THERAPY 1/> REGIONS: CPT | Mod: PO

## 2020-09-24 PROCEDURE — 97018 PARAFFIN BATH THERAPY: CPT | Mod: PO,59

## 2020-09-24 NOTE — PROGRESS NOTES
Occupational Therapy Daily Treatment Note     Date: 9/24/2020  Name: Annika Mac  Clinic Number: [id number]    Therapy Diagnosis:   Encounter Diagnoses   Name Primary?    Pain in both hands     Decreased range of motion of left thumb       Physician: Marky Hagen MD    Physician Orders: Eval & treat  Medical Diagnosis: B Hand Pain; CMC arthritis & De Quervain's  Surgical Procedure and Date: none  Evaluation Date: 07/09/20  Insurance Authorization Period Expiration: 12/31/20  Plan of Care Certification Period: 07/09/20 to 08/19/20  Date of Return to MD: TBD    Visit # / Visits authorized: 17 / 20     Time In: 11:15 am  Time Out:  12:00 pm   Total Billable Time:  45 minutes (P, 1MT, 1TE)    Precautions:  Standard and Diabetes      Subjective     Pt reports: patient reports no discomfort with custom orthosis since adjusted last visit.   Response to previous treatment: less pain in her L hand/thumb  Functional change: able to use both wrist/hands in light self-care tasks, but with pain    Pain: R=2-3/10;  L=3-4/10 pre-tx   Location: bilateral wrists/hands      Objective     Annika received supervised modality after being cleared for contraindications, for 10 minutes, as follows:  -Paraffin w/ MHP to B wrist/hands, pre-tx to decrease pain & increase tissue extensibility    Annika received the following manual therapy techniques for 10 minutes:   -Edema mgmt w/ lymphatic drainage technique;  Deep STM, including MFR, CFM, TPR & scar mgmt to B forearm/wrist/hands, using hand techniques and IASTM tools to increase blood flow/circulation, improve soft tissue pliability and decrease pain.    Annika received therapeutic exercises for 25 minutes direct care including:  Exercises B wrist/hand   Dexterciser  3 min   Isospheres 3 min   9 Peg  2 trials each hand   PHG 3/10 reps, setting 2       Home Exercises and Education Provided     Education provided:   - Reviewed HEP with T-putty for strengthening  -Progress towards  goals    Written Home Exercises Provided: Patient instructed to cont prior HEP.  Exercises were reviewed and Annika was able to demonstrate them prior to the end of the session.  Annika demonstrated good  understanding of the HEP provided.     See EMR under Patient Instructions for exercises provided prior visit.        Assessment     Patient continues to have less  pain in B wrist/hand/thumb.  Edema in L wrist/hand has decreased over the past few visits.  Patient was able to perform all therapeutic exercises listed above without any difficulty or increase in pain.         Annika is progressing well towards her goals and there are no updates to goals at this time. Pt prognosis is Good.     Pt will continue to benefit from skilled outpatient occupational therapy to address the deficits listed in the problem list on initial evaluation provide pt/family education and to maximize pt's level of independence in the home and community environment.     Anticipated barriers to occupational therapy: Arthritis, Diabetes    Pt's spiritual, cultural and educational needs considered and pt agreeable to plan of care and goals.    Goals:  Short Term Goals: (in 2 weeks)  1) Patient will be independent in HEP-met  2) Decrease pain in B hands/thumbs to no more than 6-7/10 worst in ADL/IADL's-met  3) Increase AROM in L thumb Rad/Palm ABD by 3-5 degrees for improved functioning in ADL/IADL's-previously met  4) Increase B  strength by 3-5 psi for increased functional use-met  5) Decrease edema in L wrist/thumb to trace-met      Long Term Goals: (in 6 weeks)  1) Decrease pain in B hands/thumbs to no more than 2-3/10 worst in ADL/IADL's-part met/ongoing  2) Increase AROM of B hands/thumbs to WFL for increased functioning in ADL/IADL's-met  3) Increase strength in B  by 20% of initial measures for improved functioning in ADL/IADL's-ongoing  4) Increased functioning in ADL/IADL's, as evidenced by a FOTO impairment rating of no more  than 43%-ongoing  5) Decrease edema in B wrist/hand to trace or none-met    Plan     Updated Certification Period:  08/27/20 to 09/23/20     Recommended Treatment Plan: 2 times per week for 4 weeks: Manual Therapy, Moist Heat/ Ice, Paraffin, Patient Education, Self Care, Therapeutic Activites, Therapeutic Exercise and Ultrasound      Other Recommendations: progress strengthening, as tolerated       Pratik Veloz, OT

## 2020-09-24 NOTE — PROGRESS NOTES
Physical Therapy Daily Treatment Note     Name: Annika Mac  Clinic Number: [id number]    Therapy Diagnosis:   Encounter Diagnoses   Name Primary?    Decreased ROM of left shoulder     Muscle weakness of left arm      Physician: IGLESIA Garcia MD    Visit Date: 9/24/2020    Physician Orders: PT Eval and Treat   Medical Diagnosis from Referral: M25.512,G89.29 (ICD-10-CM) - Chronic left shoulder pain   Evaluation Date: 6/30/2020  Authorization Period Expiration: 06/17/2021   Plan of Care Expiration: 10/27/2020  Visit # / Visits authorized: 18/ 20     Time In: 10:35 am  Time Out: 11:05 am  Total Appointment Time (timed & untimed codes): 30 minutes     Precautions: Standard and Fall      Subjective     Pt reports: having slight increase in left shoulder pain due to weather.    She was compliant with home exercise program.  Response to previous treatment: slight decrease in pain  Functional change: improved AROM    Pain: 5/10   Location: left shoulder      Objective     Annika received therapeutic exercises to develop strength, ROM, flexibility and posture for 30 minutes 1:1 with PT including:     Date  9/24/2020 9/17/2020 9/15/2020 9/9/2020 9/2/2020 8/27/2020 8/25/2020 8/18/2020 8/13/2020 8/11/2020 8/6/2020 8/4/2020 07/29/2020 7/27/2020 7/22/2020 7/9/2020 7/7/2020 6/30/2020   VISIT 18/20 17/20 16/20 15/20 14/20 13/20 12/20 11/20 10/20 9/20 8/20 7/20 6/20 5/20 4/20 3/20 2/20 1/1   POC EXP. DATE 10/27/2020 10/27/2020 10/27/2020 10/27/2020 10/27/2020 10/27/2020 8/30/2020 8/30/2020 8/30/2020 8/30/2020 8/30/2020 8/30/2020 8/30/2020 8/30/2020 8/30/2020 8/30/2020 8/30/2020 8/30/2020   VISIT AMOUNT  MEDICARE TOTAL 60.64  1356.32 60.64  1295.68 60.64  1235.04 60.64  1174.40 60.64  1113.76 60.64  1053.12 60.64  992.48 60.64  931.84 90.96  871.20 90.96  780.24 90.96  689.28   598.32 90.96 90.96  416.40 60.64  325.44 60.64  264.80 90.96  204.16 113.20   FACE-TO-FACE 10/2/2020 10/2/2020 10/2/2020 10/2/2020 10/2/2020 9/4/2020  "9/4/2020 9/4/2020 9/4/2020 9/4/2020 9/4/2020 9/4/2020 7/30/2020 7/30/2020 7/30/2020 7/30/2020 7/30/2020 7/30/2020   FOTO            -- -- 5/5 4/5 3/5 2/5 1/5                          Pulley Flexion  2' 2' 2' 2' 2' 2' 2' 2' 2' 2' 2' -- Not today  2' 2' 2' 2' next   TABLE:                      Scapular retractions 30 x 3" 30 x 3" 30 x 3" 30 x 3" 30 x 3" 30 x 3" 30 x 3" 20 x 3" 20 x 3" 20 x 3" 20 x 3" -- 20 x 3" 20 x 3" 20 x 3" 15 x 3" 15 x 3" 10 x 3"   Posterior shoulder roll 3 x 10 3 x 10 3 x 10 3 x 10 3 x 10 3 x 10 3 x 10 2 x 10 2 x 10  2 x 10  2 x 10 2 x 10 2 x 10  2 x 10 2 x 10 1 x 15 1 x 15 next   Wand flexion Seated   3 x 10 x 3# Seated   3 x 10 x 3# Seated   3 x 10 x 3# Seated   3 x 10 x 3# Seated   3 x 10 x 2# Seated  3 x 10 x 2# Seated  3 x 10 x 2# Seated  3 x 10 x 1# 3 x 10 x 1# bar 2 x 10 x 1# bar  2 x 10 x 1# bar 2 x 10 x 1#bar 2 x 10  2 x 10 2 x 10 1 x 15 1 x 15 next   Wand ABD Seated   3 x 10 x 3# Seated   3 x 10 x 3# Seated   3 x 10 x 3# Seated  3 x 10 x 3# Seated  3 x 10 x 2# Seated  3 x 10 x 2# Seated  3 x 10 x 2# Seated  3 x 10 x 1# 3 x 10 x 1# bar 2 x 10 x 1# bar 2 x 10 x 1# bar 2 x 10 x 1# bar 2 x 10  2 x 10 2 x 10 1 x 15 1 x 15    Shoulder:  - Flexion  - Extension  - IR  - ER Active   3 x 10 BTB  ---  3 x 10 BTB  3 x 10 BTB Active   3 x 10 BTB  ---  3 x 10 BTB  3 x 10 BTB Active   3 x 10 BTB  3 x 10 BTB  3 x 10 BTB  3 x 10 BTB Active   3 x 10 BTB  3 x 10 BTB  3 x 10 BTB  3 x 10 BTB Active   3 x 10 BTB  3 x 10 BTB  3 x 10 BTB  3 x 10 BTB Active   2 x 10 BTB  2 x 10 BTB  2 x 10 BTB  2 x 10 BTB Active   2 x 10 BTB  2 x 10 BTB  2 x 10 BTB  2 x 10 BTB Active   3 x 10 GTB  3 x 10 GTB  3 x 10 GTB  3 x 10 GTB Active   --  3 x 10 GTB  3 x 10 GTB  3 x 10 GTB Active   2 x 10 GTB  2 x 10 GTB  2 x 10 GTB  2 x 10 GTB Active  2 x 10 GTB  2 x 10 GTB  2 x 10 GTB  2 x 10 GTB Active  2 x 10 RTB  2 x 10 RTB  2 x 10 RTB 2 x 10 RTB Active  1 x 15 RTB  1 x 15 RTB  1 x 15 RTB  1 x 15 RTB Active  1 x 15 RTB  1 x 15 RTB  1 " "x 15 RTB  1 x 15 RTB   10 x 3"  10 x 3"  10 x 3'  10 x 3'   10 x 3"  10 x 3"  10 x 3"  10 x 3"   10 x 3"  10 x 3"  10 x 3"  10 x 3"    1 x 10  1 x 10  1 x 10  1 x 10   Shldr AROM: seated  Flexion  scaption  abduction   Not today  (pain)   2 x 10 x 2#  2 x 10 x 2#  2 x 10 x 2#   2 x 10 x 2#  2 x 10 x 2#  2 x 10 x 2#   2 x 10 x 2#  2 x 10 x 2#  2 x 10 x 2#   2 x 10 x 2#  2 x 10 x 2#  2 x 10 x 2#   1 x 10 x 2#  1 x 10 x 2#  1 x 10 x 2#   2 x 10 x 2#  2 x 10 x 2#  2 x 10 x 2#   2 x 10 x 2#  2 x 10 x 2#  2 x 10 x 2#   1 x 15 x 2#  2 x 10 x 1#  2 x 10 x 1#   1 x 15 x 2#  1 x 15 x 1#  1 x 15 x 1#   1 x 10 x 2#  1 x 10 x 1#  1 x 10 x 1#   2 x 10  2 x 10  2 x 10   1 x 15  1 x 15   1 x 15   1 x 10  1 x 10  1 x 10 Next?       Seated row 3 x 10 BTB 3 x 10 BTB 3 x 10 BTB 3 x 10 BTB 3 x 10 BTB 3 x 10 BTB 3 x 10 BTB 3 x 10 BTB 3 x 10 GTB 3 x 10 GTB 3 x 10 GTB 2 x 10 GTB          W  I  T 2 x 10 RTB  2 x 10 RTB  2 x 10 RTB 2 x 10 RTB  2 x 10 RTB  2 x 10 RTB 2 x 10 RTB  2 x 10 RTB  2 x 10 RTB 2 x 10 YTB  2 x 10 YTB  2 x 10 YTB 1 x 10 YTB  1 x 10 YTB  1 x 10 YTB                                       STANDING:                      Pec str        -- -- -- -- -- -- -- 3 x 15" Next? Next?     IR stretch with strap  -- -- 3 x 15" 3 x 15" -- 3 x 15" 3 x 15" 3 x 15" 3 x 15" 3 x 15" 3 x 15" Resume  3 x 15" 3 x 15" 1 x 10 Next?                                                                                                 Initials CRYSTAL ROJAS MA, MA, MA, MA MA MA BJ BJ MA MA BJ  MA SB DG SB MA        Patient receives kinesiotaping for myofascial space correction over pec minor/major.    Patient was screened for use of kinesiotape and was cleared for use.  1. Has the patient ever had a reaction to the adhesive in bandaids? No  2. Has the patient ever uses athletic/kinesiotape in the past? Yes  3. Is the patient hemodynamically impaired (PE, DVT, CHF, Kidney failure)? No  4. Can the PT/PTA apply the tape to your skin? Yes    Patient was instructed on " duration to wear the tape, proper drying techniques for the tape, and to remove the tape if he/she had any issues with it.    Patient verbalized understanding of these instructions.    Annika receives moist hot pack x 10 minutes to bilateral shoulders to decrease pain and muscle guarding.     Home Exercises and Patient Education Provided     Education provided:   - HEP  - keeping exercises in a pain free range    Written Home Exercises Provided: yes.  Exercises were reviewed and Annika was able to demonstrate them prior to the end of the session.  Annika demonstrated fair  understanding of the education provided.      See EMR under Patient Instructions for exercises provided 6/30/2020.      Assessment     Annika continues to require verbal cues for proper form during wand abduction, and resisted shoulder IR and ER.  She was unable to complete normal exercise routine due to increase in pain over anterior aspect of left shoulder in region of pec minor insertion. Pt is noted to have decreased AROM of left shoulder and increased difficulty performing exercises.  She receives kinesiotaping in attempt to decrease pain and improve mobility of left shoulder.          Annika is progressing slowly towards her goals.   Pt prognosis is Good.     Pt will continue to benefit from skilled outpatient physical therapy to address the deficits listed in the problem list box on initial evaluation, provide pt/family education and to maximize pt's level of independence in the home and community environment.     Pt's spiritual, cultural and educational needs considered and pt agreeable to plan of care and goals.    Anticipated Barriers for therapy: transportation, pain tolerance, apprehension       Goals:  Short Term Goals:  4 weeks  1. Patient will be compliant with HEP to promote the independent management of current diagnosis.  met  2. Patient will increase left shoulder flexion to 90 degrees for function of dressing. met  3. Patient will  increase left shoulder functional ER to reach C2 for function of grooming. met  4. Patient will report a decrease in complaints of left shoulder pain to 6/10 during performance of ADL's for independence of self care activities.  In progress, not met        Long Term Goals:  8 weeks  1. Patient will increase left shoulder strength to 4-/5 to improve tolerance to light house work. In progress, not met  2. Patient will increase left shoulder flexion to 135 degrees in order to place dishes in overhead cabinets independently for self care independence.  met  3. Patient will increase left shoulder functional IR to reach T7 for function of dressing. In progress, not met  4. Patient will increase scapular stabilization strength to 4/5 to improve tolerance to normal lifting. In progress, not met      Plan     Continue with planned PT POC.       Marino López, PT, DPT

## 2020-09-28 NOTE — PROGRESS NOTES
Physical Therapy Daily Treatment Note     Name: Annika Mac  Clinic Number: [id number]    Therapy Diagnosis:   Encounter Diagnoses   Name Primary?    Decreased ROM of left shoulder Yes    Muscle weakness of left arm      Physician: IGLESIA Garcia MD    Visit Date: 9/29/2020    Physician Orders: PT Eval and Treat   Medical Diagnosis from Referral: M25.512,G89.29 (ICD-10-CM) - Chronic left shoulder pain   Evaluation Date: 6/30/2020  Authorization Period Expiration: 06/17/2021   Plan of Care Expiration: 10/27/2020  Visit # / Visits authorized: 19 / 20     Time In: 10:20 am  Time Out: 11:00 am  Total Appointment Time (timed & untimed codes): 40 minutes     Precautions: Standard and Fall      Subjective     Pt reports: having slight increase in left shoulder pain due to weather.    She was compliant with home exercise program.  Response to previous treatment: slight decrease in pain  Functional change: improved AROM    Pain: 5/10   Location: left shoulder      Objective     Annika received therapeutic exercises to develop strength, ROM, flexibility and posture for 30 minutes 1:1 with PTA including:     Date  09/29/2020 9/24/2020 9/17/2020 9/15/2020 9/9/2020 9/2/2020 8/27/2020 8/25/2020 8/18/2020 8/13/2020 8/11/2020 8/6/2020 8/4/2020 07/29/2020 7/27/2020 7/22/2020 7/9/2020 7/7/2020 6/30/2020   VISIT 19/20 18/20 17/20 16/20 15/20 14/20 13/20 12/20 11/20 10/20 9/20 8/20 7/20 6/20 5/20 4/20 3/20 2/20 1/1   POC EXP. DATE 10/27/2020 10/27/2020 10/27/2020 10/27/2020 10/27/2020 10/27/2020 10/27/2020 8/30/2020 8/30/2020 8/30/2020 8/30/2020 8/30/2020 8/30/2020 8/30/2020 8/30/2020 8/30/2020 8/30/2020 8/30/2020 8/30/2020   VISIT AMOUNT  MEDICARE TOTAL 60.64  1426.96 60.64  1356.32 60.64  1295.68 60.64  1235.04 60.64  1174.40 60.64  1113.76 60.64  1053.12 60.64  992.48 60.64  931.84 90.96  871.20 90.96  780.24 90.96  689.28   598.32 90.96 90.96  416.40 60.64  325.44 60.64  264.80 90.96  204.16 113.20   FACE-TO-FACE 10/02/2020  "10/2/2020 10/2/2020 10/2/2020 10/2/2020 10/2/2020 9/4/2020 9/4/2020 9/4/2020 9/4/2020 9/4/2020 9/4/2020 9/4/2020 7/30/2020 7/30/2020 7/30/2020 7/30/2020 7/30/2020 7/30/2020   FOTO             -- -- 5/5 4/5 3/5 2/5 1/5                           Pulley Flexion  2' 2' 2' 2' 2' 2' 2' 2' 2' 2' 2' 2' -- Not today  2' 2' 2' 2' next   TABLE:                       Scapular retractions 30 x 3" 30 x 3" 30 x 3" 30 x 3" 30 x 3" 30 x 3" 30 x 3" 30 x 3" 20 x 3" 20 x 3" 20 x 3" 20 x 3" -- 20 x 3" 20 x 3" 20 x 3" 15 x 3" 15 x 3" 10 x 3"   Posterior shoulder roll 3 x 10 3 x 10 3 x 10 3 x 10 3 x 10 3 x 10 3 x 10 3 x 10 2 x 10 2 x 10  2 x 10  2 x 10 2 x 10 2 x 10  2 x 10 2 x 10 1 x 15 1 x 15 next   Wand flexion Seated  2 x 15 x 3# Seated   3 x 10 x 3# Seated   3 x 10 x 3# Seated   3 x 10 x 3# Seated   3 x 10 x 3# Seated   3 x 10 x 2# Seated  3 x 10 x 2# Seated  3 x 10 x 2# Seated  3 x 10 x 1# 3 x 10 x 1# bar 2 x 10 x 1# bar  2 x 10 x 1# bar 2 x 10 x 1#bar 2 x 10  2 x 10 2 x 10 1 x 15 1 x 15 next   Wand ABD Seated  2 x 15 x 3# Seated   3 x 10 x 3# Seated   3 x 10 x 3# Seated   3 x 10 x 3# Seated  3 x 10 x 3# Seated  3 x 10 x 2# Seated  3 x 10 x 2# Seated  3 x 10 x 2# Seated  3 x 10 x 1# 3 x 10 x 1# bar 2 x 10 x 1# bar 2 x 10 x 1# bar 2 x 10 x 1# bar 2 x 10  2 x 10 2 x 10 1 x 15 1 x 15    Shoulder:  - Flexion  - Extension  - IR  - ER Active   3 x 10 BTB  ---  3 x 10 BTB  3 x 10 BTB Active   3 x 10 BTB  ---  3 x 10 BTB  3 x 10 BTB Active   3 x 10 BTB  ---  3 x 10 BTB  3 x 10 BTB Active   3 x 10 BTB  3 x 10 BTB  3 x 10 BTB  3 x 10 BTB Active   3 x 10 BTB  3 x 10 BTB  3 x 10 BTB  3 x 10 BTB Active   3 x 10 BTB  3 x 10 BTB  3 x 10 BTB  3 x 10 BTB Active   2 x 10 BTB  2 x 10 BTB  2 x 10 BTB  2 x 10 BTB Active   2 x 10 BTB  2 x 10 BTB  2 x 10 BTB  2 x 10 BTB Active   3 x 10 GTB  3 x 10 GTB  3 x 10 GTB  3 x 10 GTB Active   --  3 x 10 GTB  3 x 10 GTB  3 x 10 GTB Active   2 x 10 GTB  2 x 10 GTB  2 x 10 GTB  2 x 10 GTB Active  2 x 10 GTB  2 x 10 " "GTB  2 x 10 GTB  2 x 10 GTB Active  2 x 10 RTB  2 x 10 RTB  2 x 10 RTB 2 x 10 RTB Active  1 x 15 RTB  1 x 15 RTB  1 x 15 RTB  1 x 15 RTB Active  1 x 15 RTB  1 x 15 RTB  1 x 15 RTB  1 x 15 RTB   10 x 3"  10 x 3"  10 x 3'  10 x 3'   10 x 3"  10 x 3"  10 x 3"  10 x 3"   10 x 3"  10 x 3"  10 x 3"  10 x 3"    1 x 10  1 x 10  1 x 10  1 x 10   Shldr AROM: seated  - Flexion  - Scaption  - Abduction   NT  2 x 10 x 2#  2 x 10 x 2#   Not today  (pain)   2 x 10 x 2#  2 x 10 x 2#  2 x 10 x 2#   2 x 10 x 2#  2 x 10 x 2#  2 x 10 x 2#   2 x 10 x 2#  2 x 10 x 2#  2 x 10 x 2#   2 x 10 x 2#  2 x 10 x 2#  2 x 10 x 2#   1 x 10 x 2#  1 x 10 x 2#  1 x 10 x 2#   2 x 10 x 2#  2 x 10 x 2#  2 x 10 x 2#   2 x 10 x 2#  2 x 10 x 2#  2 x 10 x 2#   1 x 15 x 2#  2 x 10 x 1#  2 x 10 x 1#   1 x 15 x 2#  1 x 15 x 1#  1 x 15 x 1#   1 x 10 x 2#  1 x 10 x 1#  1 x 10 x 1#   2 x 10  2 x 10  2 x 10   1 x 15  1 x 15   1 x 15   1 x 10  1 x 10  1 x 10 Next?       Seated row 3 x 10 BTB 3 x 10 BTB 3 x 10 BTB 3 x 10 BTB 3 x 10 BTB 3 x 10 BTB 3 x 10 BTB 3 x 10 BTB 3 x 10 BTB 3 x 10 GTB 3 x 10 GTB 3 x 10 GTB 2 x 10 GTB          W  I  T NT 2 x 10 RTB  2 x 10 RTB  2 x 10 RTB 2 x 10 RTB  2 x 10 RTB  2 x 10 RTB 2 x 10 RTB  2 x 10 RTB  2 x 10 RTB 2 x 10 YTB  2 x 10 YTB  2 x 10 YTB 1 x 10 YTB  1 x 10 YTB  1 x 10 YTB                                        STANDING:                       Pec str         -- -- -- -- -- -- -- 3 x 15" Next? Next?     IR stretch with strap   -- -- 3 x 15" 3 x 15" -- 3 x 15" 3 x 15" 3 x 15" 3 x 15" 3 x 15" 3 x 15" Resume  3 x 15" 3 x 15" 1 x 10 Next?                                                     Initials MARTHA THOMAS MA        Patient receives kinesiotaping for myofascial space correction over pec minor/major.    Patient was screened for use of kinesiotape and was cleared for use.  1. Has the patient ever had a reaction to the adhesive in bandaids? No  2. Has the patient ever uses " athletic/kinesiotape in the past? Yes  3. Is the patient hemodynamically impaired (PE, DVT, CHF, Kidney failure)? No  4. Can the PT/PTA apply the tape to your skin? Yes    Patient was instructed on duration to wear the tape, proper drying techniques for the tape, and to remove the tape if he/she had any issues with it.    Patient verbalized understanding of these instructions.    Annika receives moist hot pack x 10 minutes to bilateral shoulders to decrease pain and muscle guarding.     Home Exercises and Patient Education Provided     Education provided:   - HEP  - keeping exercises in a pain free range    Written Home Exercises Provided: yes.  Exercises were reviewed and Annika was able to demonstrate them prior to the end of the session.  Annika demonstrated fair  understanding of the education provided.      See EMR under Patient Instructions for exercises provided 6/30/2020.      Assessment     Annika returns to therapy with slightly increased pain levels due to weather. Pt able to compete all of today's activities well, with the most difficulty overhead flexion noted. Pt required multiple cueing to keep elbow extended for shoulder ROM and to keep within exercise prescription reps.     Annika is progressing slowly towards her goals.   Pt prognosis is Good.     Pt will continue to benefit from skilled outpatient physical therapy to address the deficits listed in the problem list box on initial evaluation, provide pt/family education and to maximize pt's level of independence in the home and community environment.     Pt's spiritual, cultural and educational needs considered and pt agreeable to plan of care and goals.    Anticipated Barriers for therapy: transportation, pain tolerance, apprehension       Goals:  Short Term Goals:  4 weeks  1. Patient will be compliant with HEP to promote the independent management of current diagnosis.  met  2. Patient will increase left shoulder flexion to 90 degrees for function of  dressing. met  3. Patient will increase left shoulder functional ER to reach C2 for function of grooming. met  4. Patient will report a decrease in complaints of left shoulder pain to 6/10 during performance of ADL's for independence of self care activities.  In progress, not met        Long Term Goals:  8 weeks  1. Patient will increase left shoulder strength to 4-/5 to improve tolerance to light house work. In progress, not met  2. Patient will increase left shoulder flexion to 135 degrees in order to place dishes in overhead cabinets independently for self care independence.  met  3. Patient will increase left shoulder functional IR to reach T7 for function of dressing. In progress, not met  4. Patient will increase scapular stabilization strength to 4/5 to improve tolerance to normal lifting. In progress, not met      Plan     Continue with planned PT POC as tolerated.    Irena Barron, PTA 1/6

## 2020-09-29 ENCOUNTER — CLINICAL SUPPORT (OUTPATIENT)
Dept: REHABILITATION | Facility: HOSPITAL | Age: 70
End: 2020-09-29
Payer: MEDICARE

## 2020-09-29 ENCOUNTER — PATIENT MESSAGE (OUTPATIENT)
Dept: OTHER | Facility: OTHER | Age: 70
End: 2020-09-29

## 2020-09-29 DIAGNOSIS — M79.641 PAIN IN BOTH HANDS: ICD-10-CM

## 2020-09-29 DIAGNOSIS — M79.642 PAIN IN BOTH HANDS: ICD-10-CM

## 2020-09-29 DIAGNOSIS — M25.642 DECREASED RANGE OF MOTION OF LEFT THUMB: ICD-10-CM

## 2020-09-29 DIAGNOSIS — M25.612 DECREASED ROM OF LEFT SHOULDER: Primary | ICD-10-CM

## 2020-09-29 DIAGNOSIS — M62.81 MUSCLE WEAKNESS OF LEFT ARM: ICD-10-CM

## 2020-09-29 PROCEDURE — 97110 THERAPEUTIC EXERCISES: CPT | Mod: PO,CQ

## 2020-09-29 PROCEDURE — 97110 THERAPEUTIC EXERCISES: CPT | Mod: PO

## 2020-09-29 PROCEDURE — 97140 MANUAL THERAPY 1/> REGIONS: CPT | Mod: PO

## 2020-09-29 PROCEDURE — 97018 PARAFFIN BATH THERAPY: CPT | Mod: PO,59

## 2020-09-29 NOTE — PROGRESS NOTES
Occupational Therapy Daily Treatment Note     Date: 9/29/2020  Name: Annika Mac  Clinic Number: [id number]    Therapy Diagnosis:   Encounter Diagnoses   Name Primary?    Pain in both hands     Decreased range of motion of left thumb       Physician: Marky Hagen MD    Physician Orders: Eval & treat  Medical Diagnosis: B Hand Pain; CMC arthritis & De Quervain's  Surgical Procedure and Date: none  Evaluation Date: 07/09/20  Insurance Authorization Period Expiration: 12/31/20  Plan of Care Certification Period: 07/09/20 to 08/19/20  Date of Return to MD: TBD    Visit # / Visits authorized: 18 / 20     Time In: 11:15 am  Time Out:  12:00 pm   Total Billable Time:  45 minutes (P, 1MT, 1TE)    Precautions:  Standard and Diabetes      Subjective     Pt reports: patient reports no discomfort with custom orthosis since adjusted last visit.   Response to previous treatment: less pain in her L hand/thumb  Functional change: able to use both wrist/hands in light self-care tasks, but with pain    Pain: R=2-3/10;  L=3-4/10 pre-tx   Location: bilateral wrists/hands      Objective     Annika received supervised modality after being cleared for contraindications, for 10 minutes, as follows:  -Paraffin w/ MHP to B wrist/hands, pre-tx to decrease pain & increase tissue extensibility    Annika received the following manual therapy techniques for 10 minutes:   -Edema mgmt w/ lymphatic drainage technique;  Deep STM, including MFR, CFM, TPR & scar mgmt to B forearm/wrist/hands, using hand techniques and IASTM tools to increase blood flow/circulation, improve soft tissue pliability and decrease pain.    Annika received therapeutic exercises for 25 minutes direct care including:  Exercises B wrist/hand   Dexterciser  3 min   Isospheres 3 min   9 Peg  2 trials each hand   PHG 3/10 reps, setting 2       Home Exercises and Education Provided     Education provided:   - Reviewed HEP with T-putty for strengthening  -Progress towards  goals    Written Home Exercises Provided: Patient instructed to cont prior HEP.  Exercises were reviewed and Annika was able to demonstrate them prior to the end of the session.  Annika demonstrated good  understanding of the HEP provided.     See EMR under Patient Instructions for exercises provided prior visit.        Assessment     See Updated Plan of Care for details on progress and status of goals          Anticipated barriers to occupational therapy: Arthritis, Diabetes    Pt's spiritual, cultural and educational needs considered and pt agreeable to plan of care and goals.      Plan     See Updated Plan of Care for details and recommendations for treatment plan.       Pratik Veloz, OT

## 2020-09-29 NOTE — PLAN OF CARE
Outpatient Therapy Updated Plan of Care     Visit Date: 9/29/2020  Name: Annika Mac  Clinic Number: [id number]    Therapy Diagnosis:   Encounter Diagnoses   Name Primary?    Pain in both hands     Decreased range of motion of left thumb      Physician: Marky Hagen MD    Physician Orders: Eval & treat  Medical Diagnosis: B Hand Pain; CMC arthritis & De Quervain's  Surgical Procedure and Date: none  Evaluation Date: 07/09/20      Total Visits Received: 18  Cancelled Visits: 0  No Show Visits: 0    Current Certification Period:  08/27/20 to 09/23/20  Precautions:  Standard, DM  Visits from Evaluation Date:  18  Functional Level Prior to Evaluation:  Independent    Subjective     Update: Patient continues to have fluctuating pain levels, however, she admits to using her B wrist/hands in more functional activities compared to initial evaluation.  Patient feels custom L CMC orthosis is helping manage the pain at times.       Pain:  Functional Pain Scale Rating 0-10:   L=6/10 on average; R=4/10 average  L=4-5/10 at best; R=3-410 best  L=7-8/10 at worst; R=5-6/10 worst   Location: B wrist/thumb   Description: Aching, Throbbing  Aggravating Factors: Lifting and gripping, pinching, or any use with both hands/thumbs, wringing towels   Easing Factors: massage, heating pad, rest and splinting    Objective     Update:   Observation/Appearance:   Minimal edema present in L wrist/hand.     Edema. Measured in centimeters.   6/19/2019 6/19/2019 08/27/20 08/27/20 09/29/20 09/29/20    Left Right Left Right Left Right   Wrist Crease 18.9 19.2 18.4 (-.3) 18.5 (-.5) 18.3 (-.1) 18.6 (+.1)   Mid palm 20.8 21.4 20.8 (+.3) 20.7 (-.6) 21.0 (+.2) 21.5 (+.8)   MCPs 20.0 19.5 19.2 (-.7) 19.5 (=) 19.5 (+.3) 19.2 (-.3)     Edema. Measured in centimeters.   6/19/2019 6/19/2019 08/27/20 08/27/20 09/29/20 09/29/20    Left Right Left Right Left Right   Thumb:         Prox. Phalanx 7.4 7.5 6.7 (=) 7.0 (+.1) 6.7 (=) 7.0 (=)   IP 7.1 7.4 7.1 (-.2)  7.3 (+.1) 7.3 (+.2) 7.3 (=)     AROM   06/19/20 06/19/20 08/27/20 08/27/20 09/20/20 09/29/20    Left Right Left Right Left Right   Wrist E/F 65* / 55* 60 / 65 * 67 (=) / 60 (=) 60 (=) / 55 (-5) 65 (+5) /60 (+5) 60/55 (=)   Wrist RD/UD 15* / 30* 15* / 45 15 (-2) / 35 (-5) 20 (-1) / 37 (-7) 20/37 (=) 15 (-5) / 40 (+3)   Thumb R/P Abd 35* / 43* 50 / 50 45/45 (=) 47 (-3) / 50 (-5) 47 (=) / 45 (-5) 45 (-2) / 52 (+2)   Thumb MP flex 30* 60 46 (-12) 50 (-10) 46 (=) 51 (+1)   Thumb IP flex 31* 53 40 (-20) 50 (-7) 48 (+8) 50 (=)   Thumb JASSO's 61 113 86 (-32) 100 (-17) 94 (+8) 101 (+1)   Thumb [place] To SF DPC 3.0 cm fro SF DPC * To SF MCP To SF MCP To SF MCP To SF MCP   * pain with testing    Sensation: Numbness & tingling in L wrist to thumb has resolved. intact for light touch & temperature, B'ly     Strength (Dyanmometer) and Pinch Strength (Pinch Gauge)  Measured in pounds and psi. Average of three trials.   6/19/2019 6/19/2019 08/11/20 08/11/20 08/27/20 08/27/20 09/29/20 09/29/20    Left Right Left Right Left Right Left Right   Rung II 10* 54 29* (+19) 63 (+9) 36 * (+7) 70 (+7) 40 * (+4) 60 * (-10)   Key Pinch 6* 15* 10.5* (+4.5) 16 (+1)  10 * (-.5) 19.5 (+3.5) 11 (+1) 17 (-2.5)   3pt Pinch 1* 7* 4* (+3) 9* (+2) 6 * (+2) 11 (+2) 13 (+7) 11 (=)   2pt Pinch NT 9* 4* (+4) 7 (-2) 5 * (+1) 7 (=) 8 (+3) 7 (=)   * pain with testing      Assessment     Update: Pain continues to fluctuate in B wrist/hand, however, patient is able to use her B hands more functionally in ADL/IADL's.  Edema appears to fluctuate as well, but has remained less than initial evaluation.  Patient has made some progress towards L hand strength, but regressed in R hand.  AROM in B wrist/hand is relatively the same since last reassessment.  Patient appears to be benefiting from custom CMC orthosis as she has gained more functioning in light resistive ADL/IADL's with its use.   Recommend patient to return to referring MD for follow up.      Goals:  Short  Term Goals: (in 2 weeks)  1) Patient will be independent in HEP-met  2) Decrease pain in B hands/thumbs to no more than 6-7/10 worst in ADL/IADL's-met  3) Increase AROM in L thumb Rad/Palm ABD by 3-5 degrees for improved functioning in ADL/IADL's-met  4) Increase B  strength by 3-5 psi for increased functional use-met  5) Decrease edema in L wrist/thumb to trace-met      Long Term Goals: (in 6 weeks)  1) Decrease pain in B hands/thumbs to no more than 2-3/10 worst in ADL/IADL's-part met/ongoing  2) Increase AROM of B hands/thumbs to WFL for increased functioning in ADL/IADL's-met  3) Increase strength in B  by 20% of initial measures for improved functioning in ADL/IADL's-part met/ongoing  4) Increased functioning in ADL/IADL's, as evidenced by a FOTO impairment rating of no more than 43%-ongoing  5) Decrease edema in B wrist/hand to trace or none-met    Previous Short Term Goals Status:   5/5 STG's met   New Short Term Goals Status:   progress to unmet LTG's  Long Term Goal Status:   continue per initial plan of care.  Reasons for Recertification of Therapy:   Updated Plan of Care needed    Plan     Updated Certification Period:  09/23/20 to 10/09/10    Recommended Treatment Plan: 2 times per week for 2 weeks: Manual Therapy, Moist Heat/ Ice, Paraffin, Patient Education, Self Care, Therapeutic Activites, Therapeutic Exercise and Ultrasound       Other Recommendations: progress strengthening, as tolerated    Pratik Veloz OT  9/29/2020      I CERTIFY THE NEED FOR THESE SERVICES FURNISHED UNDER THIS PLAN OF TREATMENT AND WHILE UNDER MY CARE    Physician's comments:        Physician's Signature: ___________________________________________________

## 2020-09-30 ENCOUNTER — LAB VISIT (OUTPATIENT)
Dept: LAB | Facility: HOSPITAL | Age: 70
End: 2020-09-30
Attending: NURSE PRACTITIONER
Payer: MEDICARE

## 2020-09-30 DIAGNOSIS — E11.42 TYPE 2 DIABETES MELLITUS WITH DIABETIC POLYNEUROPATHY, WITH LONG-TERM CURRENT USE OF INSULIN: ICD-10-CM

## 2020-09-30 DIAGNOSIS — Z79.4 TYPE 2 DIABETES MELLITUS WITH DIABETIC POLYNEUROPATHY, WITH LONG-TERM CURRENT USE OF INSULIN: ICD-10-CM

## 2020-09-30 LAB
ALBUMIN SERPL BCP-MCNC: 3.6 G/DL (ref 3.5–5.2)
ALP SERPL-CCNC: 109 U/L (ref 55–135)
ALT SERPL W/O P-5'-P-CCNC: 17 U/L (ref 10–44)
ANION GAP SERPL CALC-SCNC: 7 MMOL/L (ref 8–16)
AST SERPL-CCNC: 22 U/L (ref 10–40)
BILIRUB SERPL-MCNC: 0.2 MG/DL (ref 0.1–1)
BUN SERPL-MCNC: 30 MG/DL (ref 8–23)
CALCIUM SERPL-MCNC: 9.4 MG/DL (ref 8.7–10.5)
CHLORIDE SERPL-SCNC: 107 MMOL/L (ref 95–110)
CO2 SERPL-SCNC: 26 MMOL/L (ref 23–29)
CREAT SERPL-MCNC: 1.8 MG/DL (ref 0.5–1.4)
EST. GFR  (AFRICAN AMERICAN): 32.4 ML/MIN/1.73 M^2
EST. GFR  (NON AFRICAN AMERICAN): 28.1 ML/MIN/1.73 M^2
GLUCOSE SERPL-MCNC: 117 MG/DL (ref 70–110)
POTASSIUM SERPL-SCNC: 4.8 MMOL/L (ref 3.5–5.1)
PROT SERPL-MCNC: 6.1 G/DL (ref 6–8.4)
SODIUM SERPL-SCNC: 140 MMOL/L (ref 136–145)

## 2020-09-30 PROCEDURE — 80053 COMPREHEN METABOLIC PANEL: CPT

## 2020-09-30 PROCEDURE — 36415 COLL VENOUS BLD VENIPUNCTURE: CPT | Mod: PO

## 2020-09-30 PROCEDURE — 83036 HEMOGLOBIN GLYCOSYLATED A1C: CPT

## 2020-10-01 ENCOUNTER — CLINICAL SUPPORT (OUTPATIENT)
Dept: REHABILITATION | Facility: HOSPITAL | Age: 70
End: 2020-10-01
Payer: MEDICARE

## 2020-10-01 DIAGNOSIS — M25.642 DECREASED RANGE OF MOTION OF LEFT THUMB: ICD-10-CM

## 2020-10-01 DIAGNOSIS — M79.642 PAIN IN BOTH HANDS: ICD-10-CM

## 2020-10-01 DIAGNOSIS — M79.641 PAIN IN BOTH HANDS: ICD-10-CM

## 2020-10-01 DIAGNOSIS — M25.612 DECREASED ROM OF LEFT SHOULDER: ICD-10-CM

## 2020-10-01 DIAGNOSIS — M62.81 MUSCLE WEAKNESS OF LEFT ARM: ICD-10-CM

## 2020-10-01 LAB
ESTIMATED AVG GLUCOSE: 160 MG/DL (ref 68–131)
HBA1C MFR BLD HPLC: 7.2 % (ref 4–5.6)

## 2020-10-01 PROCEDURE — 97140 MANUAL THERAPY 1/> REGIONS: CPT | Mod: PO

## 2020-10-01 PROCEDURE — 97018 PARAFFIN BATH THERAPY: CPT | Mod: PO,59

## 2020-10-01 PROCEDURE — 97110 THERAPEUTIC EXERCISES: CPT | Mod: PO

## 2020-10-01 NOTE — PROGRESS NOTES
Occupational Therapy Daily Treatment Note     Date: 10/1/2020  Name: Annika Mac  Clinic Number: [id number]    Therapy Diagnosis:   Encounter Diagnoses   Name Primary?    Pain in both hands     Decreased range of motion of left thumb       Physician: Marky Hagen MD    Physician Orders: Eval & treat  Medical Diagnosis: B Hand Pain; CMC arthritis & De Quervain's  Surgical Procedure and Date: none  Evaluation Date: 07/09/20  Insurance Authorization Period Expiration: 12/31/20  Plan of Care Certification Period: 07/09/20 to 08/19/20  Date of Return to MD: TBD    Visit # / Visits authorized: 19 / 20     Time In: 11:15 am  Time Out:  12:00 pm   Total Billable Time:  45 minutes (P, 1MT, 1TE)    Precautions:  Standard and Diabetes      Subjective     Pt reports:  She continues to have pain in B CMC joints. She feels she is ready to have the surgery her referring MD recommended in her L thumb.   Response to previous treatment: less pain in her L hand/thumb  Functional change: able to use both wrist/hands in light self-care tasks, but with pain    Pain: R=3/10;  L=4-5/10 pre-tx   Location: bilateral wrists/hands      Objective     Annika received supervised modality after being cleared for contraindications, for 10 minutes, as follows:  -Paraffin w/ MHP to B wrist/hands, pre-tx to decrease pain & increase tissue extensibility    Annika received the following manual therapy techniques for 10 minutes:   -Edema mgmt w/ lymphatic drainage technique;  Deep STM, including MFR, CFM, TPR & scar mgmt to B forearm/wrist/hands, using hand techniques and IASTM tools to increase blood flow/circulation, improve soft tissue pliability and decrease pain.    Annika received therapeutic exercises for 20 minutes direct care including:  Exercises B wrist/hand   Dexterciser  3 min   Isospheres 3 min   9 Peg  2 trials each hand   PHG 3/10 reps, setting 2     Annika participated in Orthotic fitting and training for 5 minutes including the  following:  Custom hand-based CMC orthosis was adjusted with foam applied to IP aspect and web space of orthosis to increase comfort today. Good fit and positioning was achieved upon completion of fabrication.  Patient was provided with instrut on purpose of orthosis, proper donning/doffing, wear/care schedule and precautions to monitor.  Patient verbalized understanding of all instruction provided.    Home Exercises and Education Provided     Education provided:   - Reviewed HEP with T-putty for strengthening  -Progress towards goals    Written Home Exercises Provided: Patient instructed to cont prior HEP.  Exercises were reviewed and Annika was able to demonstrate them prior to the end of the session.  Annika demonstrated good  understanding of the HEP provided.     See EMR under Patient Instructions for exercises provided prior visit.        Assessment     Patient's pain level fluctuates daily, but remains high in her L wrist/hand.  She feels the L CMC orthosis is helping decrease her pain and she is able to do more light ADL/IADL's tasks while using the orthosis, however, it has not resolved all symptoms.  Patient is anticipating surgical intervention for her L thumb in the near future.         Anticipated barriers to occupational therapy: Arthritis, Diabetes    Pt's spiritual, cultural and educational needs considered and pt agreeable to plan of care and goals.    Goals:  Short Term Goals: (in 2 weeks)  1) Patient will be independent in HEP-met  2) Decrease pain in B hands/thumbs to no more than 6-7/10 worst in ADL/IADL's-met  3) Increase AROM in L thumb Rad/Palm ABD by 3-5 degrees for improved functioning in ADL/IADL's-met  4) Increase B  strength by 3-5 psi for increased functional use-met  5) Decrease edema in L wrist/thumb to trace-met      Long Term Goals: (in 6 weeks)  1) Decrease pain in B hands/thumbs to no more than 2-3/10 worst in ADL/IADL's-part met/ongoing  2) Increase AROM of B hands/thumbs to WFL  for increased functioning in ADL/IADL's-met  3) Increase strength in B  by 20% of initial measures for improved functioning in ADL/IADL's-part met/ongoing  4) Increased functioning in ADL/IADL's, as evidenced by a FOTO impairment rating of no more than 43%-ongoing  5) Decrease edema in B wrist/hand to trace or none-met      Plan     Updated Certification Period:  09/23/20 to 10/09/10     Recommended Treatment Plan: 2 times per week for 2 weeks: Manual Therapy, Moist Heat/ Ice, Paraffin, Patient Education, Self Care, Therapeutic Activites, Therapeutic Exercise and Ultrasound         Other Recommendations: progress strengthening, as tolerated         Pratik Veloz, OT

## 2020-10-01 NOTE — PLAN OF CARE
Outpatient Therapy Discharge Summary     Name: Annika Mac  [facility] Number: [id number]    Therapy Diagnosis:   Encounter Diagnoses   Name Primary?    Decreased ROM of left shoulder     Muscle weakness of left arm      Physician: IGLESIA Garcia MD    Physician Orders: PT Eval and Treat   Medical Diagnosis from Referral: M25.512,G89.29 (ICD-10-CM) - Chronic left shoulder pain   Evaluation Date: 6/30/2020    Date of Last visit: 10/1/2020  Total Visits Received: 20  Cancelled Visits: 10  No Show Visits: 0    Assessment    Goals:   Short Term Goals:  4 weeks  1. Patient will be compliant with HEP to promote the independent management of current diagnosis.  met  2. Patient will increase left shoulder flexion to 90 degrees for function of dressing. met  3. Patient will increase left shoulder functional ER to reach C2 for function of grooming. met  4. Patient will report a decrease in complaints of left shoulder pain to 6/10 during performance of ADL's for independence of self care activities.  met        Long Term Goals:  8 weeks  1. Patient will increase left shoulder strength to 4-/5 to improve tolerance to light house work. met  2. Patient will increase left shoulder flexion to 135 degrees in order to place dishes in overhead cabinets independently for self care independence.  met  3. Patient will increase left shoulder functional IR to reach T7 for function of dressing.  met  4. Patient will increase scapular stabilization strength to 4/5 to improve tolerance to normal lifting.  not met       Discharge reason: Patient has reached the maximum rehab potential for the present time    Plan   This patient is discharged from Physical Therapy

## 2020-10-01 NOTE — PROGRESS NOTES
Physical Therapy Daily Treatment Note     Name: Annika Mac  Clinic Number: [id number]    Therapy Diagnosis:   Encounter Diagnoses   Name Primary?    Decreased ROM of left shoulder     Muscle weakness of left arm      Physician: IGLESIA aGrcia MD    Visit Date: 10/1/2020    Physician Orders: PT Eval and Treat   Medical Diagnosis from Referral: M25.512,G89.29 (ICD-10-CM) - Chronic left shoulder pain   Evaluation Date: 6/30/2020  Authorization Period Expiration: 06/17/2021   Plan of Care Expiration: 10/27/2020  Visit # / Visits authorized: 20 / 20     Time In: 10:35 am  Time Out: 11:05 am  Total Appointment Time (timed & untimed codes): 30 minutes     Precautions: Standard and Fall      Subjective     Pt reports: having overall decrease in shoulder pain and improved mobility.  She will continue with HEP for maintenance.   She was compliant with home exercise program.  Response to previous treatment: slight decrease in pain  Functional change: improved AROM during ADL's    Pain: 4/10   Location: left shoulder      Objective     Annika received therapeutic exercises to develop strength, ROM, flexibility and posture for 30 minutes 1:1 with PT including:     Date  10/1/2020 09/29/2020 9/24/2020 9/17/2020 9/15/2020 9/9/2020 9/2/2020 8/27/2020 8/25/2020 8/18/2020 8/13/2020 8/11/2020 8/6/2020 8/4/2020 07/29/2020 7/27/2020 7/22/2020 7/9/2020 7/7/2020 6/30/2020   VISIT 20/20 19/20 18/20 17/20 16/20 15/20 14/20 13/20 12/20 11/20 10/20 9/20 8/20 7/20 6/20 5/20 4/20 3/20 2/20 1/1   POC EXP. DATE 10/27/2020 10/27/2020 10/27/2020 10/27/2020 10/27/2020 10/27/2020 10/27/2020 10/27/2020 8/30/2020 8/30/2020 8/30/2020 8/30/2020 8/30/2020 8/30/2020 8/30/2020 8/30/2020 8/30/2020 8/30/2020 8/30/2020 8/30/2020   VISIT AMOUNT  MEDICARE TOTAL 60.64  1487.60 60.64  1426.96 60.64  1356.32 60.64  1295.68 60.64  1235.04 60.64  1174.40 60.64  1113.76 60.64  1053.12 60.64  992.48 60.64  931.84 90.96  871.20 90.96  780.24 90.96  689.28   598.32  "90.96 90.96  416.40 60.64  325.44 60.64  264.80 90.96  204.16 113.20   FACE-TO-FACE 10/2/2020 10/02/2020 10/2/2020 10/2/2020 10/2/2020 10/2/2020 10/2/2020 9/4/2020 9/4/2020 9/4/2020 9/4/2020 9/4/2020 9/4/2020 9/4/2020 7/30/2020 7/30/2020 7/30/2020 7/30/2020 7/30/2020 7/30/2020   FOTO              -- -- 5/5 4/5 3/5 2/5 1/5                            Pulley Flexion  2' 2' 2' 2' 2' 2' 2' 2' 2' 2' 2' 2' 2' -- Not today  2' 2' 2' 2' next   TABLE:                        Scapular retractions 30 x 3" 30 x 3" 30 x 3" 30 x 3" 30 x 3" 30 x 3" 30 x 3" 30 x 3" 30 x 3" 20 x 3" 20 x 3" 20 x 3" 20 x 3" -- 20 x 3" 20 x 3" 20 x 3" 15 x 3" 15 x 3" 10 x 3"   Posterior shoulder roll 3 x 10 3 x 10 3 x 10 3 x 10 3 x 10 3 x 10 3 x 10 3 x 10 3 x 10 2 x 10 2 x 10  2 x 10  2 x 10 2 x 10 2 x 10  2 x 10 2 x 10 1 x 15 1 x 15 next   Wand flexion Seated  2 x 15 x 3# Seated  2 x 15 x 3# Seated   3 x 10 x 3# Seated   3 x 10 x 3# Seated   3 x 10 x 3# Seated   3 x 10 x 3# Seated   3 x 10 x 2# Seated  3 x 10 x 2# Seated  3 x 10 x 2# Seated  3 x 10 x 1# 3 x 10 x 1# bar 2 x 10 x 1# bar  2 x 10 x 1# bar 2 x 10 x 1#bar 2 x 10  2 x 10 2 x 10 1 x 15 1 x 15 next   Wand ABD Seated  2 x 15 x 3# Seated  2 x 15 x 3# Seated   3 x 10 x 3# Seated   3 x 10 x 3# Seated   3 x 10 x 3# Seated  3 x 10 x 3# Seated  3 x 10 x 2# Seated  3 x 10 x 2# Seated  3 x 10 x 2# Seated  3 x 10 x 1# 3 x 10 x 1# bar 2 x 10 x 1# bar 2 x 10 x 1# bar 2 x 10 x 1# bar 2 x 10  2 x 10 2 x 10 1 x 15 1 x 15    Shoulder:  - Flexion  - Extension  - IR  - ER Active   ---  ---  3 x 10 BTB  3 x 10 BTB Active   3 x 10 BTB  ---  3 x 10 BTB  3 x 10 BTB Active   3 x 10 BTB  ---  3 x 10 BTB  3 x 10 BTB Active   3 x 10 BTB  ---  3 x 10 BTB  3 x 10 BTB Active   3 x 10 BTB  3 x 10 BTB  3 x 10 BTB  3 x 10 BTB Active   3 x 10 BTB  3 x 10 BTB  3 x 10 BTB  3 x 10 BTB Active   3 x 10 BTB  3 x 10 BTB  3 x 10 BTB  3 x 10 BTB Active   2 x 10 BTB  2 x 10 BTB  2 x 10 BTB  2 x 10 BTB Active   2 x 10 BTB  2 x 10 BTB  2 x " "10 BTB  2 x 10 BTB Active   3 x 10 GTB  3 x 10 GTB  3 x 10 GTB  3 x 10 GTB Active   --  3 x 10 GTB  3 x 10 GTB  3 x 10 GTB Active   2 x 10 GTB  2 x 10 GTB  2 x 10 GTB  2 x 10 GTB Active  2 x 10 GTB  2 x 10 GTB  2 x 10 GTB  2 x 10 GTB Active  2 x 10 RTB  2 x 10 RTB  2 x 10 RTB 2 x 10 RTB Active  1 x 15 RTB  1 x 15 RTB  1 x 15 RTB  1 x 15 RTB Active  1 x 15 RTB  1 x 15 RTB  1 x 15 RTB  1 x 15 RTB   10 x 3"  10 x 3"  10 x 3'  10 x 3'   10 x 3"  10 x 3"  10 x 3"  10 x 3"   10 x 3"  10 x 3"  10 x 3"  10 x 3"    1 x 10  1 x 10  1 x 10  1 x 10   Shldr AROM: seated  - Flexion  - Scaption  - Abduction   2 x 10 x 2#  2 x 10 x 2#  2 x 10 x 2#   NT  2 x 10 x 2#  2 x 10 x 2#   Not today  (pain)   2 x 10 x 2#  2 x 10 x 2#  2 x 10 x 2#   2 x 10 x 2#  2 x 10 x 2#  2 x 10 x 2#   2 x 10 x 2#  2 x 10 x 2#  2 x 10 x 2#   2 x 10 x 2#  2 x 10 x 2#  2 x 10 x 2#   1 x 10 x 2#  1 x 10 x 2#  1 x 10 x 2#   2 x 10 x 2#  2 x 10 x 2#  2 x 10 x 2#   2 x 10 x 2#  2 x 10 x 2#  2 x 10 x 2#   1 x 15 x 2#  2 x 10 x 1#  2 x 10 x 1#   1 x 15 x 2#  1 x 15 x 1#  1 x 15 x 1#   1 x 10 x 2#  1 x 10 x 1#  1 x 10 x 1#   2 x 10  2 x 10  2 x 10   1 x 15  1 x 15   1 x 15   1 x 10  1 x 10  1 x 10 Next?       Seated row 3 x 10 BTB 3 x 10 BTB 3 x 10 BTB 3 x 10 BTB 3 x 10 BTB 3 x 10 BTB 3 x 10 BTB 3 x 10 BTB 3 x 10 BTB 3 x 10 BTB 3 x 10 GTB 3 x 10 GTB 3 x 10 GTB 2 x 10 GTB          W  I  T NT NT 2 x 10 RTB  2 x 10 RTB  2 x 10 RTB 2 x 10 RTB  2 x 10 RTB  2 x 10 RTB 2 x 10 RTB  2 x 10 RTB  2 x 10 RTB 2 x 10 YTB  2 x 10 YTB  2 x 10 YTB 1 x 10 YTB  1 x 10 YTB  1 x 10 YTB                                         STANDING:                        Pec str          -- -- -- -- -- -- -- 3 x 15" Next? Next?     IR stretch with strap    -- -- 3 x 15" 3 x 15" -- 3 x 15" 3 x 15" 3 x 15" 3 x 15" 3 x 15" 3 x 15" Resume  3 x 15" 3 x 15" 1 x 10 Next?                                                       Initials CRYSTAL THOMAS MA           "   Active Range of Motion: measured in standing  Shoulder Left Right   Flexion 150 150   Abduction 140 160   ER at 0 Reach T2 Reach T2   IR Reach T7 Reach T9      Upper Extremity Strength    (L) UE (R) UE   Shoulder flexion: 4/5 4/5   Shoulder Abduction: 4/5 4/5   Shoulder ER 4-/5 4+/5   Shoulder IR 4/5 4+/5   Lower Trap 4-/5 4-/5   Middle Trap 4-/5 4-/5   Rhomboids 4-/5 4-/5           Patient receives kinesiotaping for myofascial space correction over pec minor/major.    Patient was screened for use of kinesiotape and was cleared for use.  1. Has the patient ever had a reaction to the adhesive in bandaids? No  2. Has the patient ever uses athletic/kinesiotape in the past? Yes  3. Is the patient hemodynamically impaired (PE, DVT, CHF, Kidney failure)? No  4. Can the PT/PTA apply the tape to your skin? Yes    Patient was instructed on duration to wear the tape, proper drying techniques for the tape, and to remove the tape if he/she had any issues with it.    Patient verbalized understanding of these instructions.    Annika receives moist hot pack x 10 minutes to bilateral shoulders to decrease pain and muscle guarding.     Home Exercises and Patient Education Provided     Education provided:   - HEP  - keeping exercises in a pain free range    Written Home Exercises Provided: yes.  Exercises were reviewed and Annika was able to demonstrate them prior to the end of the session.  Annika demonstrated fair  understanding of the education provided.      See EMR under Patient Instructions for exercises provided 6/30/2020.      Assessment     Annika has improved AROM and strength of shoulder since beginning therapy and was able to achieve all STG's and LTG's except LTG # 4. Pt will be discharged from therapy at this time to HEP for maintenance.      Annika is progressing slowly towards her goals.   Pt prognosis is Good.     Pt will continue to benefit from skilled outpatient physical therapy to address the deficits listed in the  problem list box on initial evaluation, provide pt/family education and to maximize pt's level of independence in the home and community environment.     Pt's spiritual, cultural and educational needs considered and pt agreeable to plan of care and goals.    Anticipated Barriers for therapy: transportation, pain tolerance, apprehension       Goals:  Short Term Goals:  4 weeks  1. Patient will be compliant with HEP to promote the independent management of current diagnosis.  met  2. Patient will increase left shoulder flexion to 90 degrees for function of dressing. met  3. Patient will increase left shoulder functional ER to reach C2 for function of grooming. met  4. Patient will report a decrease in complaints of left shoulder pain to 6/10 during performance of ADL's for independence of self care activities.  met        Long Term Goals:  8 weeks  1. Patient will increase left shoulder strength to 4-/5 to improve tolerance to light house work. met  2. Patient will increase left shoulder flexion to 135 degrees in order to place dishes in overhead cabinets independently for self care independence.  met  3. Patient will increase left shoulder functional IR to reach T7 for function of dressing.  met  4. Patient will increase scapular stabilization strength to 4/5 to improve tolerance to normal lifting.  not met      Plan     Discharge pt from PT to [facility] for maintenance.    Marino López, PT

## 2020-10-06 ENCOUNTER — TELEPHONE (OUTPATIENT)
Dept: ENDOCRINOLOGY | Facility: CLINIC | Age: 70
End: 2020-10-06

## 2020-10-06 NOTE — TELEPHONE ENCOUNTER
----- Message from Beryl Guzman MA sent at 10/5/2020  4:22 PM CDT -----  Regarding: FW: G6 Sensor    ----- Message -----  From: Naomi Cornejo  Sent: 10/5/2020  12:15 PM CDT  To: [facility]ed[facility] Staff  Subject: G6 Sensor                                        Pt called inquiring on paperwork that's being requested by supplier       [phone]

## 2020-10-06 NOTE — TELEPHONE ENCOUNTER
Spoke with pt and told that we have not received anything from [facility]. Sent a message to Rossy to check on pt supply.

## 2020-10-07 ENCOUNTER — OFFICE VISIT (OUTPATIENT)
Dept: DERMATOLOGY | Facility: CLINIC | Age: 70
End: 2020-10-07
Payer: MEDICARE

## 2020-10-07 ENCOUNTER — TELEPHONE (OUTPATIENT)
Dept: DERMATOLOGY | Facility: CLINIC | Age: 70
End: 2020-10-07

## 2020-10-07 ENCOUNTER — OFFICE VISIT (OUTPATIENT)
Dept: ENDOCRINOLOGY | Facility: CLINIC | Age: 70
End: 2020-10-07
Payer: MEDICARE

## 2020-10-07 VITALS
BODY MASS INDEX: 48.82 KG/M2 | TEMPERATURE: 99 F | WEIGHT: 293 LBS | RESPIRATION RATE: 20 BRPM | HEIGHT: 65 IN | DIASTOLIC BLOOD PRESSURE: 62 MMHG | HEART RATE: 74 BPM | SYSTOLIC BLOOD PRESSURE: 134 MMHG

## 2020-10-07 DIAGNOSIS — E78.5 DYSLIPIDEMIA: Chronic | ICD-10-CM

## 2020-10-07 DIAGNOSIS — E66.01 MORBID OBESITY DUE TO EXCESS CALORIES: Chronic | ICD-10-CM

## 2020-10-07 DIAGNOSIS — L91.8 SKIN TAG: ICD-10-CM

## 2020-10-07 DIAGNOSIS — E11.22 TYPE 2 DM WITH CKD STAGE 4 AND HYPERTENSION: ICD-10-CM

## 2020-10-07 DIAGNOSIS — N18.4 TYPE 2 DM WITH CKD STAGE 4 AND HYPERTENSION: ICD-10-CM

## 2020-10-07 DIAGNOSIS — L30.9 HAND ECZEMA: Primary | ICD-10-CM

## 2020-10-07 DIAGNOSIS — I12.9 TYPE 2 DM WITH CKD STAGE 4 AND HYPERTENSION: ICD-10-CM

## 2020-10-07 DIAGNOSIS — R21 RASH AND NONSPECIFIC SKIN ERUPTION: ICD-10-CM

## 2020-10-07 DIAGNOSIS — Z79.4 TYPE 2 DIABETES MELLITUS WITH DIABETIC POLYNEUROPATHY, WITH LONG-TERM CURRENT USE OF INSULIN: Primary | ICD-10-CM

## 2020-10-07 DIAGNOSIS — N18.4 ANEMIA IN STAGE 4 CHRONIC KIDNEY DISEASE: ICD-10-CM

## 2020-10-07 DIAGNOSIS — D63.1 ANEMIA IN STAGE 4 CHRONIC KIDNEY DISEASE: ICD-10-CM

## 2020-10-07 DIAGNOSIS — E11.42 TYPE 2 DIABETES MELLITUS WITH DIABETIC POLYNEUROPATHY, WITH LONG-TERM CURRENT USE OF INSULIN: Primary | ICD-10-CM

## 2020-10-07 DIAGNOSIS — E66.01 MORBID OBESITY WITH BMI OF 50.0-59.9, ADULT: ICD-10-CM

## 2020-10-07 DIAGNOSIS — E11.649 HYPOGLYCEMIA UNAWARENESS ASSOCIATED WITH TYPE 2 DIABETES MELLITUS: ICD-10-CM

## 2020-10-07 PROCEDURE — 99999 PR PBB SHADOW E&M-EST. PATIENT-LVL IV: ICD-10-PCS | Mod: PBBFAC,,, | Performed by: DERMATOLOGY

## 2020-10-07 PROCEDURE — 99214 PR OFFICE/OUTPT VISIT, EST, LEVL IV, 30-39 MIN: ICD-10-PCS | Mod: S$PBB,,, | Performed by: INTERNAL MEDICINE

## 2020-10-07 PROCEDURE — 99214 OFFICE O/P EST MOD 30 MIN: CPT | Mod: S$PBB,,, | Performed by: INTERNAL MEDICINE

## 2020-10-07 PROCEDURE — 99999 PR PBB SHADOW E&M-EST. PATIENT-LVL V: ICD-10-PCS | Mod: PBBFAC,,, | Performed by: INTERNAL MEDICINE

## 2020-10-07 PROCEDURE — 99999 PR PBB SHADOW E&M-EST. PATIENT-LVL V: CPT | Mod: PBBFAC,,, | Performed by: INTERNAL MEDICINE

## 2020-10-07 PROCEDURE — 99214 OFFICE O/P EST MOD 30 MIN: CPT | Mod: PBBFAC | Performed by: DERMATOLOGY

## 2020-10-07 PROCEDURE — 99999 PR PBB SHADOW E&M-EST. PATIENT-LVL IV: CPT | Mod: PBBFAC,,, | Performed by: DERMATOLOGY

## 2020-10-07 PROCEDURE — 99213 PR OFFICE/OUTPT VISIT, EST, LEVL III, 20-29 MIN: ICD-10-PCS | Mod: S$PBB,,, | Performed by: DERMATOLOGY

## 2020-10-07 PROCEDURE — 99215 OFFICE O/P EST HI 40 MIN: CPT | Mod: PBBFAC,27 | Performed by: INTERNAL MEDICINE

## 2020-10-07 PROCEDURE — 99213 OFFICE O/P EST LOW 20 MIN: CPT | Mod: S$PBB,,, | Performed by: DERMATOLOGY

## 2020-10-07 RX ORDER — BETAMETHASONE DIPROPIONATE 0.5 MG/G
CREAM TOPICAL 2 TIMES DAILY
Qty: 45 G | Refills: 0 | Status: SHIPPED | OUTPATIENT
Start: 2020-10-07 | End: 2022-01-14

## 2020-10-07 RX ORDER — BLOOD-GLUCOSE SENSOR
EACH MISCELLANEOUS
Qty: 2 DEVICE | Refills: 11 | Status: SHIPPED | OUTPATIENT
Start: 2020-10-07 | End: 2022-03-22

## 2020-10-07 RX ORDER — SEMAGLUTIDE 1.34 MG/ML
0.75 INJECTION, SOLUTION SUBCUTANEOUS
Qty: 9 ML | Refills: 4 | Status: SHIPPED | OUTPATIENT
Start: 2020-10-07 | End: 2021-05-26

## 2020-10-07 RX ORDER — CLOBETASOL PROPIONATE 0.5 MG/G
CREAM TOPICAL 2 TIMES DAILY
Status: CANCELLED | OUTPATIENT
Start: 2020-10-07

## 2020-10-07 NOTE — ASSESSMENT & PLAN NOTE
Discussed the need to lose weight for general health as well as better diabetes control.  Increase metformin to 1 mg weekly.  Referred to bariatric medicine.  Patient is not interested in weight loss surgery.

## 2020-10-07 NOTE — Clinical Note
Erin Cummins,    Ms. Mac requested an appointment so she can download her Dexcom data and also for routine follow-up.    Thanks

## 2020-10-07 NOTE — LETTER
October 7, 2020      Mamie Cotton MD  [address]  [facility] [address]           [facility] D[facility] [address]  [address]  [facility] [address]  Phone: [phone]  Fax: [phone]          Patient: Annika Mac   MR Number: [id number]   YOB: 1950   Date of Visit: 10/7/2020       Dear Dr. Mamie Cotton:    Thank you for referring Annika Mac to me for evaluation. Attached you will find relevant portions of my assessment and plan of care.    If you have questions, please do not hesitate to call me. I look forward to following Annika Mac along with you.    Sincerely,    Jason Liang MD    Enclosure  CC:  No Recipients    If you would like to receive this communication electronically, please contact [email or web address] or [phone] to request more information on [email or web address] Link access.    For providers and/or their staff who would like to refer a patient to [facility], please contact us through our one-stop-shop provider referral line, [facility], at [phone].    If you feel you have received this communication in error or would no longer like to receive these types of communications, please e-mail [email or web address]

## 2020-10-07 NOTE — PATIENT INSTRUCTIONS
If glucose below 140 and if you aren't going to eat as much, reduce the insulin dose by 20 units.      1. Go to the following website:   [email or web address]-[email or web address]-education-use/[email or web address]-[email or web address]-computer-[email or web address]-users  2. Watch the 4 minute video. Follow the instructions step by step to set up your clarity account and upload your data.  3. Send a [facility] message to your doctor stating the email address that you used to set up your Clarity account.   4. Your doctor will send an email back with an invitation to share your data on Clarity.

## 2020-10-07 NOTE — PROGRESS NOTES
Subjective:       Patient ID:  Annika Mac is a 69 y.o. female who presents for   Chief Complaint   Patient presents with    Eczema     hands     Patient is a 68 yo female present for hand exzema.  No tx.    Also tags of the neck.      Review of Systems   Constitutional: Negative for fever, chills and fatigue.   Respiratory: Negative for cough and shortness of breath.    Gastrointestinal: Negative for nausea, vomiting, diarrhea and constipation.   Skin: Positive for itching, rash, dry skin and wears hat. Negative for daily sunscreen use and recent sunburn.   Hematologic/Lymphatic: Does not bruise/bleed easily.        Objective:    Physical Exam   Constitutional: She appears well-developed and well-nourished.   Eyes: No conjunctival no injection.   Neurological: She is alert and oriented to person, place, and time.   Psychiatric: She has a normal mood and affect.   Skin:   Areas Examined (abnormalities noted in diagram):   Neck Inspection Performed  RUE Inspected  LUE Inspection Performed  Nails and Digits Inspection Performed                   Diagram Legend     Erythematous scaling macule/papule c/w actinic keratosis       Vascular papule c/w angioma      Pigmented verrucoid papule/plaque c/w seborrheic keratosis      Yellow umbilicated papule c/w sebaceous hyperplasia      Irregularly shaped tan macule c/w lentigo     1-2 mm smooth white papules consistent with Milia      Movable subcutaneous cyst with punctum c/w epidermal inclusion cyst      Subcutaneous movable cyst c/w pilar cyst      Firm pink to brown papule c/w dermatofibroma      Pedunculated fleshy papule(s) c/w skin tag(s)      Evenly pigmented macule c/w junctional nevus     Mildly variegated pigmented, slightly irregular-bordered macule c/w mildly atypical nevus      Flesh colored to evenly pigmented papule c/w intradermal nevus       Pink pearly papule/plaque c/w basal cell carcinoma      Erythematous hyperkeratotic cursted plaque c/w SCC       Surgical scar with no sign of skin cancer recurrence      Open and closed comedones      Inflammatory papules and pustules      Verrucoid papule consistent consistent with wart     Erythematous eczematous patches and plaques     Dystrophic onycholytic nail with subungual debris c/w onychomycosis     Umbilicated papule    Erythematous-base heme-crusted tan verrucoid plaque consistent with inflamed seborrheic keratosis     Erythematous Silvery Scaling Plaque c/w Psoriasis     See annotation      Assessment / Plan:        Hand eczema  -     betamethasone dipropionate (DIPROLENE) 0.05 % cream; Apply topically 2 (two) times daily. Prn hand rash  Dispense: 45 g; Refill: 0  Discussed with patient the etiology and pathogenesis of the disease or skin lesion(s) and possible treatments and aggravators.    Reviewed with patient different treatment options and associated risks.  Discussed with patient to use organic coconut oil or pure shea butter at least daily for moisturization for the body and organic jojoba oil at least daily for the face.  Sandals or slippers at home as not to slide around and risk fall on non carpeted floors if applied to the soles.  No hot water bathing reviewed.  Recommended more hand  than water washing for routine germ prevention.  Proper application of medications and or care for affected area(s) and condition(s) reviewed.    Rash and nonspecific skin eruption  -     Ambulatory referral/consult to Dermatology    Skin tag  Discussed with patient the benign nature of these lesions and that no treatment is indicated.    Prn cosmetic hyfrecation of tags on the l neck.  Costs $150.  Scar and recurrence reviewed.  Patient can also consider folk treatment of string strangulation at home.      Other orders  -     Cancel: clobetasoL (TEMOVATE) 0.05 % cream; Apply topically 2 (two) times daily.  Dispense:               Follow up if symptoms worsen or fail to improve.

## 2020-10-07 NOTE — ASSESSMENT & PLAN NOTE
Reviewed goals of therapy are to get the best control we can without hypoglycemia.  Options for therapy are limited by chronic kidney disease.    Referred to education today to download Dexcom    Medication changes:   Continue:    1. 70/30 insulin - 110/85/60; I told her it is okay to decrease the dose of 70/30 by 20 units if her sugar is below 140 and she is not going to be eating much. She was hospitalized for hypoglycemia and is terrified of having more hypoglycemic episodes.  Increase:  1. Ozempic 0.5 > 1 mg weekly     Reviewed patient's current insulin regimen. Clarified proper insulin dose and timing in relation to meals, etc. Insulin injection sites and proper rotation instructed.      Advised frequent self blood glucose monitoring. Patient encouraged to document glucose results and bring them to every clinic visit.    Hypoglycemia precautions discussed. Instructed on precautions before driving.      Close adherence to lifestyle changes recommended.      Health maintenance topics addressed above.  Due for urine microalbumin/creatinine.      Lab Results   Component Value Date    HGBA1C 7.2 (H) 09/30/2020    HGBA1C 6.7 (H) 07/02/2020    HGBA1C 7.8 (H) 03/13/2020

## 2020-10-07 NOTE — PROGRESS NOTES
FOLLOW-UP VISIT    Subjective:      Chief Complaint: Diabetes      HPI: Annika Mac is a 69 y.o. female who is here for a follow-up evaluation for type 2 diabetes    Patient is new to me and was last seen by NP Ursula Salinas on 7/7/2020    Past Medical History:   Diagnosis Date    Allergy     Anemia 7/27/2012    Anticoagulant long-term use     Arthritis     Asthma     CHF (congestive heart failure)     CKD (chronic kidney disease) stage 3, GFR 30-59 ml/min 7/27/2012    Clotting disorder     Colon polyp     Deep vein thrombophlebitis of left leg 7/27/2012    Degenerative disc disease     Diabetic peripheral neuropathy associated with type 2 diabetes mellitus 7/27/2012    GERD (gastroesophageal reflux disease)     HTN (hypertension) 7/27/2012    Hyperlipidemia 7/27/2012    Lead-induced gout of ankle or foot     right going on three weeks    Nonproliferative diabetic retinopathy of right eye 7/27/2012    Obesity     Obstructive sleep apnea 7/27/2012    Osteoporosis     Proliferative diabetic retinopathy of left eye 7/27/2012    Stroke 8/1/2003    Type 2 diabetes mellitus with diabetic polyneuropathy 7/27/2012    Ulcer      With regards to the diabetes, obesity, :     Diagnosed: 1987. Started oral agents then NPH and Regular insulin. Converted to MDI with Lantus and Novolog in 2/2006 after knee surgery. D/c TZD r/t weight gain 7/08 and added Symlin. Stopped Symlin 2/09. Januvia added 2/10. D/C Januvia 12/10 r/t cost; resumed though pt assistance in 2016. Converted to U500 in 7/10.      Hospitalized for hypoglycemia in June 2019      Professional CGMS done:  Unable to interpret CGM as seems to have fallen off after ~48 hours and limited data  Information available reviewed and episode of mild hypoglycemia overnight x 1 and after lunch x 1  Otherwise essentially global hyperglycemia  No insulin doses documented     Known complications:  DKA-  RN-  PN+  Nephropathy+     Current regimen:  1. Insulin  70/30:    Breakfast: 110 units for normal meal    Lunch: 85 units with normal meal    Dinner: 60 units for normal meal    -- if she is below 150 before a meal she will decrease 1/2 the dose due to fear of   hypoglycemia      Patient was previously on Humulin R U-500 insulin, but was switched to 70/30   insulin due to cost.    2.   Ozempic once weekly 0.5 mg      She is having occ lows.   Glucose log reviewed: Patient has Dexcom G6, but did not bring her  today for download. She has a Clarity account, but does not know the login information. Review of that shows she last uploaded data in July.                                Hypoglycemia:  Rarely   Knows how to correct with 15 grams of carbs- juice, coke, or glucose tablets.     Denies missed doses.  Has Medicare Extra Help     Eats 3 meals daily, + snacks  Drinks water, unsweet tea.   No formal exercise.     Education - last visit: July, 2020     Diabetes Management Status    Statin: Taking  ACE/ARB: Taking    Screening or Prevention Patient's value Goal Complete/Controlled?   HgA1C Testing and Control   Lab Results   Component Value Date    HGBA1C 7.2 (H) 09/30/2020      Annually/Less than 8% Yes   Lipid profile : 09/08/2020 Annually Yes   LDL control Lab Results   Component Value Date    LDLCALC 69.8 09/08/2020    Annually/Less than 100 mg/dl  Yes   Nephropathy screening Lab Results   Component Value Date    LABMICR 27.0 05/01/2019     Lab Results   Component Value Date    PROTEINUA Negative 11/13/2019    Annually Yes   Blood pressure BP Readings from Last 1 Encounters:   10/07/20 134/62    Less than 140/90 Yes   Dilated retinal exam : 12/20/2019 Annually Yes   Foot exam   : 07/28/2020 Annually Yes       Reviewed past medical, family, social history and updated as appropriate.    Review of Systems   Respiratory: Negative for shortness of breath.    Cardiovascular: Negative for chest pain.   Gastrointestinal: Negative for abdominal pain.     Objective:      Vitals:    10/07/20 0844   BP: 134/62   Pulse: 74   Resp: 20   Temp: 98.6 °F (37 °C)     BP Readings from Last 5 Encounters:   10/07/20 134/62   09/21/20 (!) 142/78   09/16/20 120/80   08/19/20 128/66   08/19/20 (!) 151/64     Physical Exam  Vitals signs and nursing note reviewed.   Constitutional:       General: She is not in acute distress.     Appearance: She is well-developed.   HENT:      Head: Normocephalic and atraumatic.   Eyes:      General:         Right eye: No discharge.         Left eye: No discharge.      Conjunctiva/sclera: Conjunctivae normal.   Neck:      Thyroid: No thyromegaly.      Trachea: No tracheal deviation.   Cardiovascular:      Rate and Rhythm: Normal rate.   Pulmonary:      Effort: Pulmonary effort is normal. No respiratory distress.   Musculoskeletal:      Comments: No digital clubbing or extremity cyanosis   Neurological:      Mental Status: She is alert and oriented to person, place, and time.      Coordination: Coordination normal.   Psychiatric:         Behavior: Behavior normal.         Wt Readings from Last 50 Encounters:   10/07/20 (!) 144.2 kg (318 lb)   09/21/20 (!) 140.6 kg (310 lb)   09/16/20 (!) 144.7 kg (319 lb 0.1 oz)   08/19/20 (!) 146.6 kg (323 lb 3.1 oz)   08/19/20 (!) 146.6 kg (323 lb 3.1 oz)   07/28/20 (!) 139.7 kg (308 lb)   07/07/20 (!) 139.7 kg (308 lb)   07/02/20 (!) 142 kg (313 lb 0.9 oz)   06/11/20 (!) 142 kg (313 lb)   05/22/20 (!) 142 kg (313 lb 0.9 oz)   05/06/20 (!) 141.9 kg (312 lb 13.3 oz)   04/28/20 134.3 kg (296 lb)   02/26/20 134.3 kg (296 lb)   02/06/20 135.6 kg (299 lb)   01/22/20 (!) 137.3 kg (302 lb 11.1 oz)   01/12/20 133.8 kg (295 lb)   01/06/20 (!) 138.1 kg (304 lb 7.3 oz)   01/02/20 (!) 137.4 kg (303 lb)   11/22/19 (!) 137.5 kg (303 lb 2.1 oz)   11/20/19 (!) 137.4 kg (303 lb)   11/20/19 (!) 140.4 kg (309 lb 8.4 oz)   10/30/19 133.8 kg (295 lb)   10/03/19 (!) 142 kg (313 lb 0.9 oz)   09/24/19 (!) 141 kg (310 lb 13.6 oz)   09/19/19 (!) 142.5  kg (314 lb 2.5 oz)   09/12/19 (!) 145.2 kg (320 lb)   09/11/19 (!) 145.2 kg (320 lb 1.7 oz)   09/04/19 (!) 145.2 kg (320 lb 1.7 oz)   08/27/19 (!) 144.8 kg (319 lb 3.6 oz)   08/15/19 135.2 kg (298 lb)   08/08/19 (!) 141.7 kg (312 lb 4.5 oz)   07/24/19 (!) 143 kg (315 lb 4.8 oz)   07/03/19 (!) 140 kg (308 lb 10.3 oz)   07/01/19 (!) 139.7 kg (307 lb 14 oz)   06/14/19 (!) 138 kg (304 lb 3.8 oz)   06/13/19 (!) 138 kg (304 lb 3.8 oz)   06/13/19 132 kg (291 lb)   06/04/19 132 kg (291 lb)   05/23/19 (!) 142 kg (313 lb 0.9 oz)   05/14/19 (!) 138.7 kg (305 lb 12.5 oz)   05/08/19 (!) 140 kg (308 lb 10.3 oz)   04/12/19 134.7 kg (297 lb)   04/12/19 134.7 kg (296 lb 15.4 oz)   04/09/19 134.7 kg (297 lb)   03/27/19 134.7 kg (296 lb 15.4 oz)   03/13/19 134.7 kg (297 lb)   03/07/19 134.7 kg (297 lb)   02/13/19 131.1 kg (289 lb 0.4 oz)   02/13/19 131.1 kg (289 lb 0.4 oz)   02/13/19 131.1 kg (289 lb)       Lab Results   Component Value Date    HGBA1C 7.2 (H) 09/30/2020     Lab Results   Component Value Date    CHOL 134 09/08/2020    HDL 42 09/08/2020    LDLCALC 69.8 09/08/2020    TRIG 111 09/08/2020    CHOLHDL 31.3 09/08/2020     Lab Results   Component Value Date     09/30/2020    K 4.8 09/30/2020     09/30/2020    CO2 26 09/30/2020     (H) 09/30/2020    BUN 30 (H) 09/30/2020    CREATININE 1.8 (H) 09/30/2020    CALCIUM 9.4 09/30/2020    PROT 6.1 09/30/2020    ALBUMIN 3.6 09/30/2020    BILITOT 0.2 09/30/2020    ALKPHOS 109 09/30/2020    AST 22 09/30/2020    ALT 17 09/30/2020    ANIONGAP 7 (L) 09/30/2020    ESTGFRAFRICA 32.4 (A) 09/30/2020    EGFRNONAA 28.1 (A) 09/30/2020    TSH 1.365 09/08/2020      Lab Results   Component Value Date    MICALBCREAT 11.9 05/01/2019       Assessment/Plan:     Type 2 DM with CKD stage 4 and hypertension  Reviewed goals of therapy are to get the best control we can without hypoglycemia.  Options for therapy are limited by chronic kidney disease.    Referred to education today to  download Dexcom    Medication changes:   Continue:    1. 70/30 insulin - 110/85/60; I told her it is okay to decrease the dose of 70/30 by 20 units if her sugar is below 140 and she is not going to be eating much. She was hospitalized for hypoglycemia and is terrified of having more hypoglycemic episodes.  Increase:  1. Ozempic 0.5 > 1 mg weekly     Reviewed patient's current insulin regimen. Clarified proper insulin dose and timing in relation to meals, etc. Insulin injection sites and proper rotation instructed.      Advised frequent self blood glucose monitoring. Patient encouraged to document glucose results and bring them to every clinic visit.    Hypoglycemia precautions discussed. Instructed on precautions before driving.      Close adherence to lifestyle changes recommended.      Health maintenance topics addressed above.  Due for urine microalbumin/creatinine.      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  Less hypoglycemia since starting Dexcom.    Uncontrolled type 2 diabetes mellitus with both eyes affected by proliferative retinopathy without macular edema, with long-term current use of insulin  Follows with Ophthalmology every 12 months. Appointment due in December with Dr. Maldonado.    Anemia in chronic kidney disease (CKD)  May give falsely low readings on A1c    Dyslipidemia  LDL controlled on current dose of statin.    Morbid obesity with BMI of 50.0-59.9, adult  Discussed the need to lose weight for general health as well as better diabetes control.  Increase metformin to 1 mg weekly.  Referred to bariatric medicine.  Patient is not interested in weight loss surgery.      RTC in 3 months

## 2020-10-07 NOTE — TELEPHONE ENCOUNTER
LVM informing the pt that her medication was called in today.    TB     ----- Message from Cristin Alicea sent at 10/7/2020  2:57 PM CDT -----  Regarding: prescription  Pt states some medicine was supposed to be called in today  to pharmacy . Please give pt a call back at [phone]

## 2020-10-08 ENCOUNTER — CLINICAL SUPPORT (OUTPATIENT)
Dept: REHABILITATION | Facility: HOSPITAL | Age: 70
End: 2020-10-08
Payer: MEDICARE

## 2020-10-08 DIAGNOSIS — M79.641 PAIN IN BOTH HANDS: ICD-10-CM

## 2020-10-08 DIAGNOSIS — M25.642 DECREASED RANGE OF MOTION OF LEFT THUMB: ICD-10-CM

## 2020-10-08 DIAGNOSIS — M79.642 PAIN IN BOTH HANDS: ICD-10-CM

## 2020-10-08 PROCEDURE — 97018 PARAFFIN BATH THERAPY: CPT | Mod: PO

## 2020-10-08 PROCEDURE — L3913 HFO W/O JOINTS CF: HCPCS | Mod: PO

## 2020-10-08 NOTE — PROGRESS NOTES
Occupational Therapy Daily Treatment Note     Date: 10/8/2020  Name: Annika Mac  Clinic Number: [id number]    Therapy Diagnosis:   Encounter Diagnoses   Name Primary?    Pain in both hands     Decreased range of motion of left thumb       Physician: IGLESIA Garcia MD    Physician Orders: Eval & treat  Medical Diagnosis: B Hand Pain; CMC arthritis & De Quervain's  Surgical Procedure and Date: none  Evaluation Date: 07/09/20  Insurance Authorization Period Expiration: 12/31/20  Plan of Care Certification Period: 07/09/20 to 08/19/20  Date of Return to MD: TBD    Visit # / Visits authorized: 19 / 20     Time In: 11:15 am  Time Out:  12:00 pm   Total Billable Time:  45 minutes (P, )    Precautions:  Standard and Diabetes      Subjective     Pt reports: she feels the L CMC orthosis is helping, but she continues to have pain. She is complaining of a slight increase in pain in her R wrist/thumb today.    Response to previous treatment: less pain in her L hand/thumb  Functional change: able to use both wrist/hands in light self-care tasks, but with pain    Pain: R=4/10;  L=4-5/10 pre-tx   Location: bilateral wrists/hands      Objective     Annika received supervised modality after being cleared for contraindications, for 10 minutes, as follows:  -Paraffin w/ MHP to B wrist/hands, pre-tx to decrease pain & increase tissue extensibility    Annika    Annika   Annika participated in Orthotic fitting and training for 35 minutes including the following:  A custom hand-based CMC orthosis () was fabricated for patient's R thumb to provided increased CMC-MP joint stability.  Good fit and positioning was achieved upon completion of fabrication.  Patient was provided with instrut on purpose of orthosis, proper donning/doffing, wear/care schedule and precautions to monitor.  Patient verbalized understanding of all instruction provided.    Home Exercises and Education Provided     Education provided:   - Reviewed HEP with  T-putty for strengthening  -Progress towards goals    Written Home Exercises Provided: Patient instructed to cont prior HEP.  Exercises were reviewed and Annika was able to demonstrate them prior to the end of the session.  Annika demonstrated good  understanding of the HEP provided.     See EMR under Patient Instructions for exercises provided prior visit.        Assessment     Patient feels the L CMC orthosis is helping decrease her pain, but hasn't resolved all symptoms.   She did have complaints of increased pain in her R CMC, therefore, a custom orthosis was fabricated today to address CMC-MP instability.  Patient is anticipating surgical intervention for her L thumb in the near future.         Anticipated barriers to occupational therapy: Arthritis, Diabetes    Pt's spiritual, cultural and educational needs considered and pt agreeable to plan of care and goals.    Goals:  Short Term Goals: (in 2 weeks)  1) Patient will be independent in HEP-met  2) Decrease pain in B hands/thumbs to no more than 6-7/10 worst in ADL/IADL's-met  3) Increase AROM in L thumb Rad/Palm ABD by 3-5 degrees for improved functioning in ADL/IADL's-met  4) Increase B  strength by 3-5 psi for increased functional use-met  5) Decrease edema in L wrist/thumb to trace-met      Long Term Goals: (in 6 weeks)  1) Decrease pain in B hands/thumbs to no more than 2-3/10 worst in ADL/IADL's-part met/ongoing  2) Increase AROM of B hands/thumbs to WFL for increased functioning in ADL/IADL's-met  3) Increase strength in B  by 20% of initial measures for improved functioning in ADL/IADL's-part met/ongoing  4) Increased functioning in ADL/IADL's, as evidenced by a FOTO impairment rating of no more than 43%-ongoing  5) Decrease edema in B wrist/hand to trace or none-met      Plan     Updated Certification Period:  09/23/20 to 10/09/10     Recommended Treatment Plan: 2 times per week for 2 weeks: Manual Therapy, Moist Heat/ Ice, Paraffin, Patient  Education, Self Care, Therapeutic Activites, Therapeutic Exercise and Ultrasound         Other Recommendations: progress strengthening, as tolerated         Pratik Veloz, OT

## 2020-10-13 ENCOUNTER — TELEPHONE (OUTPATIENT)
Dept: INFECTIOUS DISEASES | Facility: HOSPITAL | Age: 70
End: 2020-10-13

## 2020-10-13 ENCOUNTER — CLINICAL SUPPORT (OUTPATIENT)
Dept: REHABILITATION | Facility: HOSPITAL | Age: 70
End: 2020-10-13
Payer: MEDICARE

## 2020-10-13 DIAGNOSIS — M79.641 PAIN IN BOTH HANDS: ICD-10-CM

## 2020-10-13 DIAGNOSIS — M79.642 PAIN IN BOTH HANDS: ICD-10-CM

## 2020-10-13 DIAGNOSIS — M25.642 DECREASED RANGE OF MOTION OF LEFT THUMB: ICD-10-CM

## 2020-10-13 PROCEDURE — 97018 PARAFFIN BATH THERAPY: CPT | Mod: PO

## 2020-10-13 PROCEDURE — 97110 THERAPEUTIC EXERCISES: CPT | Mod: PO

## 2020-10-13 PROCEDURE — 97140 MANUAL THERAPY 1/> REGIONS: CPT | Mod: PO

## 2020-10-13 NOTE — PROGRESS NOTES
Occupational Therapy Daily Treatment Note     Date: 10/13/2020  Name: Annika Mac  Clinic Number: [id number]    Therapy Diagnosis:   Encounter Diagnoses   Name Primary?    Pain in both hands     Decreased range of motion of left thumb       Physician: IGLESIA Garcia MD    Physician Orders: Eval & treat  Medical Diagnosis: B Hand Pain; CMC arthritis & De Quervain's  Surgical Procedure and Date: none  Evaluation Date: 07/09/20  Insurance Authorization Period Expiration: 12/31/20  Plan of Care Certification Period: 07/09/20 to 08/19/20  Date of Return to MD: TBD    Visit # / Visits authorized: 20 / 20     Time In: 1:15 pm  Time Out:  2:00 pm   Total Billable Time:  45 minutes (P, 1MT, 1TE)    Precautions:  Standard and Diabetes      Subjective     Pt reports: B CMC orthosis is helping, but she continues to have pain. She continues to complain of a slight increase in pain in her R wrist/thumb.    Response to previous treatment: less pain in her L hand/thumb  Functional change: able to use both wrist/hands in light self-care tasks, but with pain    Pain: R=4/10;  L=4-5/10 pre-tx   Location: bilateral wrists/hands      Objective     Annika received supervised modality after being cleared for contraindications, for 10 minutes, as follows:  -Paraffin w/ MHP to B wrist/hands, pre-tx to decrease pain & increase tissue extensibility    Annika received the following manual therapy techniques for 10 minutes:   -Edema mgmt w/ lymphatic drainage technique;  Deep STM, including MFR, CFM, TPR & scar mgmt to B forearm/wrist/hands, using hand techniques and IASTM tools to increase blood flow/circulation, improve soft tissue pliability and decrease pain.    Annika received therapeutic exercises for 20 minutes direct care including:  Objective measures taken for Discharge summary today, See Plan of Care section for details    Home Exercises and Education Provided     Education provided:   - Competed training for final HEP with T-putty  for strengthening  -Progress towards goals    Written Home Exercises Provided: Patient instructed to cont prior HEP.  Exercises were reviewed and Annika was able to demonstrate them prior to the end of the session.  Annika demonstrated good  understanding of the HEP provided.     See EMR under Patient Instructions for exercises provided prior visit.        Assessment     See D/C Summary for details on assessment, progress and status of goals    Anticipated barriers to occupational therapy: Arthritis, Diabetes    Pt's spiritual, cultural and educational needs considered and pt agreeable to plan of care and goals.      Plan     See Discharge Summary for details       Pratik Veloz, OT

## 2020-10-13 NOTE — PLAN OF CARE
Outpatient Therapy Discharge Summary     Name: Annika Mac  [facility] Number: [id number]    Therapy Diagnosis:   Encounter Diagnoses   Name Primary?    Pain in both hands     Decreased range of motion of left thumb      Physician: IGLESIA Garcia MD    Physician Orders: Eval & treat  Medical Diagnosis: B Hand Pain; CMC arthritis & De Quervain's  Surgical Procedure and Date: none  Evaluation Date: 07/09/20    Date of Last visit: 10/12/20  Total Visits Received: 21  Cancelled Visits: 0  No Show Visits: 0    Objective     Observation/Appearance:   Minimal edema present in L wrist/hand.     Edema. Measured in centimeters.   6/19/2019 6/19/2019 09/29/20 09/29/20 10/13/20 10/13/20    Left Right Left Right Left Right   Wrist Crease 18.9 19.2 18.3 (-.1) 18.6 (+.1) 18.5 (+.2) 18.9 (+.3)   Mid palm 20.8 21.4 21.0 (+.2) 21.5 (+.8) 21.0 (=) 21.2 (-.3)   MCPs 20.0 19.5 19.5 (+.3) 19.2 (-.3) 19.5 (=) 20.0 (+.8)     Edema. Measured in centimeters.   6/19/2019 6/19/2019 08/27/20 08/27/20 09/29/20 09/29/20 10/13/20 10/13/20    Left Right Left Right Left Right Left Right   Thumb:           Prox. Phalanx 7.4 7.5 6.7 (=) 7.0 (+.1) 6.7 (=) 7.0 (=) 6.7 (=) 7.0 (=)   IP 7.1 7.4 7.1 (-.2) 7.3 (+.1) 7.3 (+.2) 7.3 (=) 7.1 (-.2) 7.2 (-.1)     AROM   06/19/20 06/19/20 09/20/20 09/29/20 10/13/20 10/13/20    Left Right Left Right Left Right   Wrist E/F 65* / 55* 60 / 65 * 65 (+5) /60 (+5) 60/55 (=) 67 (+2) / 60 (=) 62 (+2) / 65 (+10)   Wrist RD/UD 15* / 30* 15* / 45 20/37 (=) 15 (-5) / 40 (+3) 20 (=) / 45 (+8) 22 (+7) / 42 (+2)   Thumb R/P Abd 35* / 43* 50 / 50 47 (=) / 45 (-5) 45 (-2) / 52 (+2) 50 (+3) / 50 (+5) 49 (+5) / 50 (-2)   Thumb MP flex 30* 60 46 (=) 51 (+1) 50 (+4) 50 (-1)   Thumb IP flex 31* 53 48 (+8) 50 (=) 59 (+11) 50 (=)   Thumb JASSO's 61 113 94 (+8) 101 (+1) 109 (+8) 100  (-1)   Thumb [place] To SF DPC 3.0 cm fro SF DPC * To SF MCP To SF MCP To SF DPC To SF MCP   * pain with testing    Sensation: Numbness & tingling in L wrist to  thumb has resolved. intact for light touch & temperature, B'ly     Strength (Dyanmometer) and Pinch Strength (Pinch Gauge)  Measured in pounds and psi. Average of three trials.   6/19/2019 6/19/2019 08/11/20 08/11/20 08/27/20 08/27/20 09/29/20 09/29/20 10/13/20 10/13/20    Left Right Left Right Left Right Left Right Left  Right   Rung II 10* 54 29* (+19) 63 (+9) 36 * (+7) 70 (+7) 40 * (+4) 60 * (-10) 45 (+5) 66 (+6)   Key Pinch 6* 15* 10.5* (+4.5) 16 (+1)  10 * (-.5) 19.5 (+3.5) 11 (+1) 17 (-2.5) 12 (+1) 15 (-2)   3pt Pinch 1* 7* 4* (+3) 9* (+2) 6 * (+2) 11 (+2) 13 (+7) 11 (=) 10 (-3) 12 (+1)   2pt Pinch NT 9* 4* (+4) 7 (-2) 5 * (+1) 7 (=) 8 (+3) 7 (=) 8 (=) 8 (+1)   * pain with testing      Assessment      Patient has made progress towards all goals set at initial evaluation for increased ROM, strength and coordination of B rist/hands, however, she continues to have pain in B CMC joints.  She will be scheduled for surgery to address this condition in the near future, however, at this time patient has reached maximum benefit from therapy and is ready for D/C to a final HEP to maintain strength in B hands.      Goals:   Short Term Goals: (in 2 weeks)  1) Patient will be independent in HEP-met  2) Decrease pain in B hands/thumbs to no more than 6-7/10 worst in ADL/IADL's-met  3) Increase AROM in L thumb Rad/Palm ABD by 3-5 degrees for improved functioning in ADL/IADL's-met  4) Increase B  strength by 3-5 psi for increased functional use-met  5) Decrease edema in L wrist/thumb to trace-met      Long Term Goals: (in 6 weeks)  1) Decrease pain in B hands/thumbs to no more than 2-3/10 worst in ADL/IADL's-part met  2) Increase AROM of B hands/thumbs to WFL for increased functioning in ADL/IADL's-met  3) Increase strength in B  by 20% of initial measures for improved functioning in ADL/IADL's-met  4) Increased functioning in ADL/IADL's, as evidenced by a FOTO impairment rating of no more than 43%-met  5)  Decrease edema in B wrist/hand to trace or none-met    Discharge reason: Patient has reached the maximum rehab potential for the present time    Plan     This patient is discharged from Occupational Therapy

## 2020-10-14 ENCOUNTER — LAB VISIT (OUTPATIENT)
Dept: LAB | Facility: HOSPITAL | Age: 70
End: 2020-10-14
Attending: INTERNAL MEDICINE
Payer: MEDICARE

## 2020-10-14 ENCOUNTER — OFFICE VISIT (OUTPATIENT)
Dept: HEMATOLOGY/ONCOLOGY | Facility: CLINIC | Age: 70
End: 2020-10-14
Payer: MEDICARE

## 2020-10-14 VITALS
SYSTOLIC BLOOD PRESSURE: 131 MMHG | HEIGHT: 65 IN | DIASTOLIC BLOOD PRESSURE: 61 MMHG | RESPIRATION RATE: 16 BRPM | HEART RATE: 82 BPM | WEIGHT: 293 LBS | TEMPERATURE: 98 F | OXYGEN SATURATION: 96 % | BODY MASS INDEX: 48.82 KG/M2

## 2020-10-14 DIAGNOSIS — D63.1 ANEMIA OF CHRONIC RENAL FAILURE, STAGE 3 (MODERATE): ICD-10-CM

## 2020-10-14 DIAGNOSIS — D64.9 ANEMIA, UNSPECIFIED TYPE: Primary | ICD-10-CM

## 2020-10-14 DIAGNOSIS — Z13.9 SCREENING FOR CONDITION: Primary | ICD-10-CM

## 2020-10-14 DIAGNOSIS — N18.30 ANEMIA OF CHRONIC RENAL FAILURE, STAGE 3 (MODERATE): ICD-10-CM

## 2020-10-14 LAB
ALBUMIN SERPL BCP-MCNC: 3.5 G/DL (ref 3.5–5.2)
ALP SERPL-CCNC: 114 U/L (ref 55–135)
ALT SERPL W/O P-5'-P-CCNC: 16 U/L (ref 10–44)
ANION GAP SERPL CALC-SCNC: 9 MMOL/L (ref 8–16)
AST SERPL-CCNC: 19 U/L (ref 10–40)
BILIRUB SERPL-MCNC: 0.2 MG/DL (ref 0.1–1)
BUN SERPL-MCNC: 26 MG/DL (ref 8–23)
CALCIUM SERPL-MCNC: 9.1 MG/DL (ref 8.7–10.5)
CHLORIDE SERPL-SCNC: 104 MMOL/L (ref 95–110)
CO2 SERPL-SCNC: 24 MMOL/L (ref 23–29)
CREAT SERPL-MCNC: 1.8 MG/DL (ref 0.5–1.4)
CTP QC/QA: YES
ERYTHROCYTE [DISTWIDTH] IN BLOOD BY AUTOMATED COUNT: 15.8 % (ref 11.5–14.5)
EST. GFR  (AFRICAN AMERICAN): 32.4 ML/MIN/1.73 M^2
EST. GFR  (NON AFRICAN AMERICAN): 28.1 ML/MIN/1.73 M^2
FECAL OCCULT BLOOD, POC: NEGATIVE
FERRITIN SERPL-MCNC: 56 NG/ML (ref 20–300)
GLUCOSE SERPL-MCNC: 266 MG/DL (ref 70–110)
HCT VFR BLD AUTO: 29.9 % (ref 37–48.5)
HGB BLD-MCNC: 9.4 G/DL (ref 12–16)
IMM GRANULOCYTES # BLD AUTO: 0.02 K/UL (ref 0–0.04)
LDH SERPL L TO P-CCNC: 233 U/L (ref 110–260)
MCH RBC QN AUTO: 28.4 PG (ref 27–31)
MCHC RBC AUTO-ENTMCNC: 31.4 G/DL (ref 32–36)
MCV RBC AUTO: 90 FL (ref 82–98)
NEUTROPHILS # BLD AUTO: 4.2 K/UL (ref 1.8–7.7)
PLATELET # BLD AUTO: 251 K/UL (ref 150–350)
PMV BLD AUTO: 11.5 FL (ref 9.2–12.9)
POTASSIUM SERPL-SCNC: 4.6 MMOL/L (ref 3.5–5.1)
PROT SERPL-MCNC: 6.2 G/DL (ref 6–8.4)
RBC # BLD AUTO: 3.31 M/UL (ref 4–5.4)
SODIUM SERPL-SCNC: 137 MMOL/L (ref 136–145)
WBC # BLD AUTO: 6.42 K/UL (ref 3.9–12.7)

## 2020-10-14 PROCEDURE — 85027 COMPLETE CBC AUTOMATED: CPT

## 2020-10-14 PROCEDURE — 80053 COMPREHEN METABOLIC PANEL: CPT

## 2020-10-14 PROCEDURE — 83615 LACTATE (LD) (LDH) ENZYME: CPT

## 2020-10-14 PROCEDURE — 99214 OFFICE O/P EST MOD 30 MIN: CPT | Mod: S$PBB,,, | Performed by: INTERNAL MEDICINE

## 2020-10-14 PROCEDURE — 99214 PR OFFICE/OUTPT VISIT, EST, LEVL IV, 30-39 MIN: ICD-10-PCS | Mod: S$PBB,,, | Performed by: INTERNAL MEDICINE

## 2020-10-14 PROCEDURE — 36415 COLL VENOUS BLD VENIPUNCTURE: CPT

## 2020-10-14 PROCEDURE — 99999 PR PBB SHADOW E&M-EST. PATIENT-LVL V: ICD-10-PCS | Mod: PBBFAC,,, | Performed by: INTERNAL MEDICINE

## 2020-10-14 PROCEDURE — 82270 OCCULT BLOOD FECES: CPT | Mod: PBBFAC

## 2020-10-14 PROCEDURE — 82728 ASSAY OF FERRITIN: CPT

## 2020-10-14 PROCEDURE — 99999 PR PBB SHADOW E&M-EST. PATIENT-LVL V: CPT | Mod: PBBFAC,,, | Performed by: INTERNAL MEDICINE

## 2020-10-14 PROCEDURE — 99215 OFFICE O/P EST HI 40 MIN: CPT | Mod: PBBFAC | Performed by: INTERNAL MEDICINE

## 2020-10-14 NOTE — PROGRESS NOTES
Subjective:       Patient ID: Annika Mac is a 69 y.o. female.    Chief Complaint: No chief complaint on file.    Anemia       Mrs. Mac is a 70 yo morbidly obese female who presents today for follow up for her longstanding anemia.  Since her last visit she was seen at the [facility] on 09/21/2020.  Iron infusions were recommended due to low iron.  I have reviewed the pertinent note.    Multiple stool samples in the past have been negative for occult blood.  Today she submitted three  more stool samples that were all negative.  Of note, she had an EGD, colonoscopy, and video capsule swallow 2 years ago.  A tubular adenoma was removed from her colon while her EGD had shown patchy mildly erythematous mucosa without bleeding in the gastric body.  Biopsies were negative for malignancy.  The video capsule swallow was unremarkable although it was not an optimal study.    His CBC today shows a white count of 6,400 per cubic mm, hemoglobin 9.4 grams/deciliter, hematocrit 29.9%, MCV 90 and platelets are 221,000 per cubic mm.  Her ferritin is 56 ng/mL while the LDH is 233 U/L.    Review of Systems  Overall she feels fair. She has longstanding arthritic pains which are no worse. She also mentions her longstanding constipation.  She states that when she strains she occasionally experiences hemorrhoidal bleeding.  She denies any anxiety, depression, easy bruising, fevers, chills, night sweats, weight loss, nausea, vomiting, diarrhea, diplopia, blurred vision, epistaxis, hematuria, or headaches.      Objective:      Physical Exam  GENERAL: She is alert, oriented to time, place, person, pleasant, well nourished, in no acute physical distress. Presents on a wheelchair.   VITAL SIGNS: Reviewed.   HEENT: Normal. There are no nasal, oral, lip, gingival, auricular, lid, conjunctival lesions. Pupils are equal, reactive to light and   accommodation and extraocular muscle movements are intact. Mucosae are moist and pink, and  there is no thrush.   NECK: Supple without JVD, adenopathy, or thyromegaly.   LUNGS: Clear to auscultation without wheezing, rales, or rhonchi.   CARDIOVASCULAR: Reveals an S1, S2, no murmurs, no rubs, no gallops.   ABDOMEN: Obese, soft, nontender, without organomegaly. Bowel sounds are present. A median abdominal scar is seen extending from the umbilicus to the symphysis pubis.   EXTREMITIES: No cyanosis or clubbing. There is 1(+) edema at the ankle level bilaterally.   SKIN: Does not have petechiae, rashes, induration, or ecchymoses.   NEUROLOGIC: Motor function is 5/5, DTRs are 0 to 1+ bilaterally, symmetrical, and cranial nerves within normal limits.   LYMPHATIC: There is no cervical, axillary, or supraclavicular adenopathy.       Assessment:       1. Anemia in stage 3 chronic kidney disease      2. Morbid obesity due to excess calories     3. 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           Plan:     I had a long discussion with Mrs. Mac.  I suspect that her anemia is multifactorial but primarily secondary to her underlying renal dysfunction.  I again recommended that she consider a bone marrow biopsy.  She has not made up her mind on that yet.  I will see her again in 8 weeks.  In regards to the Nephrology recommendations to administer iron infusions, while I am not opposed to it, I am not convinced that there are truly needed given today's ferritin of 56 ngper mL.  Since ferritin is an acute phase reactant, the best way to evaluate her iron stores would be with a bone marrow biopsy.  That being said, there is no downside in receiving iron supplementation therapy if Nephrology service strongly feels that she should be supplemented.   Of note, her anemia is normochromic normocytic rather than hypochromic microcytic.   Her multiple questions were answered to her satisfaction.  Time spent examining and interviewing Mrs. Mac and reviewing her chart was 30 minutes.

## 2020-10-20 DIAGNOSIS — E11.42 TYPE 2 DIABETES MELLITUS WITH DIABETIC POLYNEUROPATHY, WITH LONG-TERM CURRENT USE OF INSULIN: Primary | ICD-10-CM

## 2020-10-20 DIAGNOSIS — Z79.4 TYPE 2 DIABETES MELLITUS WITH DIABETIC POLYNEUROPATHY, WITH LONG-TERM CURRENT USE OF INSULIN: Primary | ICD-10-CM

## 2020-10-20 RX ORDER — BLOOD-GLUCOSE TRANSMITTER
EACH MISCELLANEOUS
Qty: 1 DEVICE | Refills: 3 | Status: SHIPPED | OUTPATIENT
Start: 2020-10-20 | End: 2022-03-22

## 2020-10-21 ENCOUNTER — INFUSION (OUTPATIENT)
Dept: INFECTIOUS DISEASES | Facility: HOSPITAL | Age: 70
End: 2020-10-21
Attending: INTERNAL MEDICINE
Payer: MEDICARE

## 2020-10-21 VITALS
BODY MASS INDEX: 48.82 KG/M2 | WEIGHT: 293 LBS | OXYGEN SATURATION: 97 % | DIASTOLIC BLOOD PRESSURE: 63 MMHG | HEIGHT: 65 IN | HEART RATE: 79 BPM | RESPIRATION RATE: 19 BRPM | TEMPERATURE: 99 F | SYSTOLIC BLOOD PRESSURE: 137 MMHG

## 2020-10-21 DIAGNOSIS — D50.9 IRON DEFICIENCY ANEMIA, UNSPECIFIED IRON DEFICIENCY ANEMIA TYPE: ICD-10-CM

## 2020-10-21 DIAGNOSIS — N18.9 ANEMIA OF RENAL DISEASE: Primary | ICD-10-CM

## 2020-10-21 DIAGNOSIS — D63.1 ANEMIA OF RENAL DISEASE: Primary | ICD-10-CM

## 2020-10-21 PROCEDURE — 63600175 PHARM REV CODE 636 W HCPCS: Mod: JG | Performed by: INTERNAL MEDICINE

## 2020-10-21 PROCEDURE — 96365 THER/PROPH/DIAG IV INF INIT: CPT

## 2020-10-21 PROCEDURE — 25000003 PHARM REV CODE 250: Performed by: INTERNAL MEDICINE

## 2020-10-21 RX ADMIN — FERRIC CARBOXYMALTOSE INJECTION 750 MG: 50 INJECTION, SOLUTION INTRAVENOUS at 10:10

## 2020-10-23 ENCOUNTER — TELEPHONE (OUTPATIENT)
Dept: ENDOCRINOLOGY | Facility: CLINIC | Age: 70
End: 2020-10-23

## 2020-10-24 ENCOUNTER — NURSE TRIAGE (OUTPATIENT)
Dept: ADMINISTRATIVE | Facility: CLINIC | Age: 70
End: 2020-10-24

## 2020-10-24 ENCOUNTER — OFFICE VISIT (OUTPATIENT)
Dept: URGENT CARE | Facility: CLINIC | Age: 70
End: 2020-10-24
Payer: MEDICARE

## 2020-10-24 VITALS
BODY MASS INDEX: 48.82 KG/M2 | HEIGHT: 65 IN | WEIGHT: 293 LBS | OXYGEN SATURATION: 97 % | DIASTOLIC BLOOD PRESSURE: 64 MMHG | TEMPERATURE: 98 F | SYSTOLIC BLOOD PRESSURE: 135 MMHG | HEART RATE: 70 BPM | RESPIRATION RATE: 16 BRPM

## 2020-10-24 DIAGNOSIS — R22.0 GINGIVAL SWELLING: Primary | ICD-10-CM

## 2020-10-24 DIAGNOSIS — H65.192 OTHER NON-RECURRENT ACUTE NONSUPPURATIVE OTITIS MEDIA OF LEFT EAR: ICD-10-CM

## 2020-10-24 PROCEDURE — 99214 PR OFFICE/OUTPT VISIT, EST, LEVL IV, 30-39 MIN: ICD-10-PCS | Mod: S$GLB,,, | Performed by: INTERNAL MEDICINE

## 2020-10-24 PROCEDURE — 99214 OFFICE O/P EST MOD 30 MIN: CPT | Mod: S$GLB,,, | Performed by: INTERNAL MEDICINE

## 2020-10-24 RX ORDER — AMOXICILLIN AND CLAVULANATE POTASSIUM 875; 125 MG/1; MG/1
1 TABLET, FILM COATED ORAL 2 TIMES DAILY
Qty: 14 TABLET | Refills: 0 | Status: SHIPPED | OUTPATIENT
Start: 2020-10-24 | End: 2020-10-31

## 2020-10-24 RX ORDER — AMOXICILLIN AND CLAVULANATE POTASSIUM 875; 125 MG/1; MG/1
1 TABLET, FILM COATED ORAL 2 TIMES DAILY
Qty: 14 TABLET | Refills: 0 | Status: SHIPPED | OUTPATIENT
Start: 2020-10-24 | End: 2020-10-24

## 2020-10-24 NOTE — PROGRESS NOTES
"Subjective:       Patient ID: Annika Mac is a 69 y.o. female.    Vitals:  height is 5' 5" (1.651 m) and weight is 143.3 kg (316 lb) (abnormal). Her temperature is 98.1 °F (36.7 °C). Her blood pressure is 135/64 and her pulse is 70. Her respiration is 16 and oxygen saturation is 97%.     Chief Complaint: Dental Pain    Patient has swollen, painful gums. Swollen lymph nodes. Patient noted that she had an iron infusion on 10/20/20 at [facility].    Dental Pain   This is a new problem. The current episode started in the past 7 days (Onset Thursday). The problem occurs constantly. The problem has been gradually worsening. The pain is at a severity of 9/10. The pain is severe. Associated symptoms include difficulty swallowing, facial pain and thermal sensitivity. Pertinent negatives include no fever, oral bleeding or sinus pressure. She has tried acetaminophen (Oragel) for the symptoms. The treatment provided no relief.       Constitution: Positive for chills and sweating. Negative for fatigue and fever.   HENT: Positive for ear pain, dental problem, drooling, facial swelling, sore throat and trouble swallowing. Negative for congestion, sinus pain, sinus pressure and voice change.    Neck: Positive for painful lymph nodes.   Eyes: Negative for eye redness.   Respiratory: Negative for chest tightness, cough, sputum production, bloody sputum, COPD, shortness of breath, stridor, wheezing and asthma.    Gastrointestinal: Negative for nausea, vomiting and diarrhea.   Musculoskeletal: Negative for muscle ache.   Skin: Negative for rash and erythema.   Allergic/Immunologic: Negative for seasonal allergies and asthma.   Neurological: Positive for headaches.   Hematologic/Lymphatic: Positive for swollen lymph nodes.       Objective:      Physical Exam   Constitutional: She is oriented to person, place, and time. She appears well-developed. No distress.   HENT:   Head: Normocephalic and atraumatic.   Ears:   Right Ear: External " ear normal. A middle ear effusion is present.   Left Ear: Tympanic membrane and external ear normal.  No middle ear effusion.   Nose: Nose normal.   Mouth/Throat: Uvula is midline, oropharynx is clear and moist and mucous membranes are normal. She does not have dentures. No oral lesions. Abnormal dentition. No dental abscesses, uvula swelling or dental caries. Cobblestoning present. No oropharyngeal exudate, posterior oropharyngeal edema, posterior oropharyngeal erythema or tonsillar abscesses. Tonsils are 1+ on the right. Tonsils are 1+ on the left. No tonsillar exudate.   She has irritation and swelling of the maxillary gingiva. There is no drainage.       Comments: She has irritation and swelling of the maxillary gingiva. There is no drainage.   Eyes: Pupils are equal, round, and reactive to light. Conjunctivae and EOM are normal. Right eye exhibits no discharge. Left eye exhibits no discharge. No scleral icterus.   Neck: Normal range of motion. Neck supple. No neck rigidity. No edema, no erythema and normal range of motion present.   Cardiovascular: Normal rate, regular rhythm and normal heart sounds. Exam reveals no gallop and no friction rub.   No murmur heard.  Pulmonary/Chest: Effort normal. No respiratory distress. She has no wheezes. She has no rales.   Abdominal: Soft. Bowel sounds are normal. She exhibits no distension.   Lymphadenopathy:     She has no cervical adenopathy.        Right cervical: No superficial cervical and no posterior cervical adenopathy present.       Left cervical: No superficial cervical and no posterior cervical adenopathy present.   Neurological: She is alert and oriented to person, place, and time.   Skin: Skin is warm, dry, not diaphoretic and no rash. Capillary refill takes less than 2 seconds. erythemaPsychiatric: Her behavior is normal.   Nursing note and vitals reviewed.        Assessment:       1. Gingival swelling    2. Other non-recurrent acute nonsuppurative otitis media  of left ear        Plan:         Gingival swelling  -     Discontinue: amoxicillin-clavulanate 875-125mg (AUGMENTIN) 875-125 mg per tablet; Take 1 tablet by mouth 2 (two) times daily. for 7 days  Dispense: 14 tablet; Refill: 0  -     Discontinue: (Magic mouthwash) 1:1:1 Benadryl 12.5mg/5ml liq, aluminum & magnesium hydroxide-simehticone (Maalox), lidocaine viscous 2%; Swish and spit 10 mLs every 4 (four) hours as needed. for mouth sores  Dispense: 360 mL; Refill: 0  -     (Magic mouthwash) 1:1:1 Benadryl 12.5mg/5ml liq, aluminum & magnesium hydroxide-simehticone (Maalox), lidocaine viscous 2%; Swish and spit 10 mLs every 4 (four) hours as needed. for mouth sores  Dispense: 360 mL; Refill: 0  -     amoxicillin-clavulanate 875-125mg (AUGMENTIN) 875-125 mg per tablet; Take 1 tablet by mouth 2 (two) times daily. for 7 days  Dispense: 14 tablet; Refill: 0    Other non-recurrent acute nonsuppurative otitis media of left ear  -     Discontinue: amoxicillin-clavulanate 875-125mg (AUGMENTIN) 875-125 mg per tablet; Take 1 tablet by mouth 2 (two) times daily. for 7 days  Dispense: 14 tablet; Refill: 0  -     amoxicillin-clavulanate 875-125mg (AUGMENTIN) 875-125 mg per tablet; Take 1 tablet by mouth 2 (two) times daily. for 7 days  Dispense: 14 tablet; Refill: 0

## 2020-10-24 NOTE — TELEPHONE ENCOUNTER
Reason for Disposition   Patient sounds very sick or weak to the triager    Additional Information   Negative: Moving the earlobe or touching the ear clearly increases the pain   Negative: Foreign body struck in the ear (e.g., bug, piece of cotton)   Negative: Followed an ear injury   Negative: [1] Recently diagnosed with otitis media AND [2] currently on oral antibiotics   Negative: [1] Stiff neck (unable to touch chin to chest) AND [2] fever   Negative: [1] Bony area of skull behind the ear is pink or swollen AND [2] fever   Negative: Fever > 104 F (40 C)    Protocols used: EARACHE-A-AH  pt states she wears dentures, gums are sore and swollen and ears hurt. Pt states she used otc meds did not help. Sx started yest. got worse today. afeb. rates pain 9. Just took Tylenol didnt help. Pt admits to feeling very sick. rec ED. Pt states she has a ride and is going to urgent care. Call back with questions

## 2020-10-24 NOTE — PATIENT INSTRUCTIONS
Call your PCP and your dentist first thing Monday morning for a follow up appointment. I did not see evidence of a dental infection on my exam today. There was some cloudy fluid in your middle ear on the left side which could be causing your discomfort. Use tylenol for the pain. Take antibiotics as prescribed. If you have worsening pain, inability to swallow, hoarseness of the voice please go to the ER.     You must understand that you have received treatment at an Urgent Care facility only, and that you may be  released before all of your medical problems are known or treated. Urgent Care facilities are not equipped to  handle life threatening emergencies. It is recommended that you seek care at an Emergency Department for  further evaluation of worsening or concerning symptoms, or possibly life threatening conditions as  discussed.      Your Mouth: Keeping It Healthy  Have you ever thought about how much you use your mouth? Without it, you couldnt talk with your friends, enjoy your food, or even laugh at a joke. Do you care for your hardworking mouth as well as you should? If not, tooth decay and gum disease could be putting your smile in danger. Take control now to keep your mouth healthy.  Benefits of a healthy mouth     Why bother caring for your teeth and gums? For 1 thing, what goes on in your mouth can affect the rest of your body. Poor oral health is linked to problems, like heart disease, stroke, and diabetes. But thats not all. If youre pregnant, caring for your teeth and gums can help make sure your baby is born on time and healthy. Good oral health can also:  · Help you chew and digest your food.  · Keep your mouth comfortable and pain-free.  · Help you speak clearly.  · Keep your breath fresh.  · Keep you looking and feeling good. And when you feel good about your mouth, youre more likely to smile!  Your oral health  At dental visits, you will be asked about signs of problems. Before your visit,  think about the answers to these questions:   · Are your teeth sensitive to heat or cold?  · Do your teeth hurt if you have sweet foods or drinks?  · Has the way you bite down changed?  · Do you feel pain when you bite down?  · Do any of your teeth feel loose?  · Do you have bad breath?  · Are your gums swollen, puffy, or sore?  · Do your gums bleed when you floss or brush?  · Has the color of your gums changed?  · Have your gums pulled back from your teeth?  · Are you happy with your smile?  Date Last Reviewed: 7/1/2015 © 2000-2017 [employer]. [address] [place] [address], [place] [address]. All rights reserved. This information is not intended as a substitute for professional medical care. Always follow your healthcare professional's instructions.        Middle Ear Infection (Adult)  You have an infection of the middle ear, the space behind the eardrum. This is also called acute otitis media (AOM). Sometimes it is caused by the common cold. This is because congestion can block the internal passage (eustachian tube) that drains fluid from the middle ear. When the middle ear fills with fluid, bacteria can grow there and cause an infection. Oral antibiotics are used to treat this illness, not ear drops. Symptoms usually start to improve within 1 to 2 days of treatment.    Home care  The following are general care guidelines:  · Finish all of the antibiotic medicine given, even though you may feel better after the first few days.  · You may use over-the-counter medicine, such as acetaminophen or ibuprofen, to control pain and fever, unless something else was prescribed. If you have chronic liver or kidney disease or have ever had a stomach ulcer or gastrointestinal bleeding, talk with your healthcare provider before using these medicines. Do not give aspirin to anyone under 18 years of age who has a fever. It may cause severe illness or death.  Follow-up care  Follow up with your healthcare provider, or as  advised, in 2 weeks if all symptoms have not gotten better, or if hearing doesn't go back to normal within 1 month.  When to seek medical advice  Call your healthcare provider right away if any of these occur:  · Ear pain gets worse or does not improve after 3 days of treatment  · Unusual drowsiness or confusion  · Neck pain, stiff neck, or headache  · Fluid or blood draining from the ear canal  · Fever of 100.4°F (38°C) or as advised   · Seizure  Date Last Reviewed: 6/1/2016 © 2000-2017 [employer]. [address] [place] [id number]. All rights reserved. This information is not intended as a substitute for professional medical care. Always follow your healthcare professional's instructions.

## 2020-10-26 ENCOUNTER — TELEPHONE (OUTPATIENT)
Dept: INTERNAL MEDICINE | Facility: CLINIC | Age: 70
End: 2020-10-26

## 2020-10-26 NOTE — TELEPHONE ENCOUNTER
----- Message from Kristina Nur sent at 10/26/2020  8:18 AM CDT -----  Contact: [phone]  Patient states she went to  on Saturday because of her gums being infected.Patient was asked to follow up with  PCP. Please call and advise

## 2020-11-02 ENCOUNTER — HOSPITAL ENCOUNTER (OUTPATIENT)
Dept: RADIOLOGY | Facility: HOSPITAL | Age: 70
Discharge: HOME | End: 2020-11-02
Attending: INTERNAL MEDICINE
Payer: MEDICARE

## 2020-11-02 ENCOUNTER — INFUSION (OUTPATIENT)
Dept: INFECTIOUS DISEASES | Facility: HOSPITAL | Age: 70
End: 2020-11-02
Attending: INTERNAL MEDICINE
Payer: MEDICARE

## 2020-11-02 VITALS
SYSTOLIC BLOOD PRESSURE: 117 MMHG | HEART RATE: 82 BPM | RESPIRATION RATE: 18 BRPM | OXYGEN SATURATION: 97 % | WEIGHT: 293 LBS | DIASTOLIC BLOOD PRESSURE: 56 MMHG | BODY MASS INDEX: 48.82 KG/M2 | HEIGHT: 65 IN

## 2020-11-02 VITALS — HEIGHT: 65 IN | BODY MASS INDEX: 48.82 KG/M2 | WEIGHT: 293 LBS

## 2020-11-02 DIAGNOSIS — Z12.31 ENCOUNTER FOR SCREENING MAMMOGRAM FOR BREAST CANCER: ICD-10-CM

## 2020-11-02 DIAGNOSIS — D50.9 IRON DEFICIENCY ANEMIA, UNSPECIFIED IRON DEFICIENCY ANEMIA TYPE: ICD-10-CM

## 2020-11-02 DIAGNOSIS — D63.1 ANEMIA OF RENAL DISEASE: Primary | ICD-10-CM

## 2020-11-02 DIAGNOSIS — N18.9 ANEMIA OF RENAL DISEASE: Primary | ICD-10-CM

## 2020-11-02 PROCEDURE — 77067 SCR MAMMO BI INCL CAD: CPT | Mod: TC

## 2020-11-02 PROCEDURE — 63600175 PHARM REV CODE 636 W HCPCS: Mod: JG | Performed by: INTERNAL MEDICINE

## 2020-11-02 PROCEDURE — 77063 BREAST TOMOSYNTHESIS BI: CPT | Mod: 26,,, | Performed by: RADIOLOGY

## 2020-11-02 PROCEDURE — 77063 MAMMO DIGITAL SCREENING BILAT WITH TOMO: ICD-10-PCS | Mod: 26,,, | Performed by: RADIOLOGY

## 2020-11-02 PROCEDURE — 96365 THER/PROPH/DIAG IV INF INIT: CPT

## 2020-11-02 PROCEDURE — 77067 MAMMO DIGITAL SCREENING BILAT WITH TOMO: ICD-10-PCS | Mod: 26,,, | Performed by: RADIOLOGY

## 2020-11-02 PROCEDURE — 77067 SCR MAMMO BI INCL CAD: CPT | Mod: 26,,, | Performed by: RADIOLOGY

## 2020-11-02 RX ADMIN — FERRIC CARBOXYMALTOSE INJECTION 750 MG: 50 INJECTION, SOLUTION INTRAVENOUS at 11:11

## 2020-11-02 NOTE — PROGRESS NOTES
Patient arrived for 2/2  Injectafer infusion,t observed for 30 minutes post infusion. Tolerated without any difficulty, left in NAD

## 2020-11-03 ENCOUNTER — PATIENT OUTREACH (OUTPATIENT)
Dept: ADMINISTRATIVE | Facility: OTHER | Age: 70
End: 2020-11-03

## 2020-11-03 NOTE — PROGRESS NOTES
LINKS immunization registry not responding  Care Everywhere updated  Health Maintenance updated  Chart reviewed for overdue Proactive [facility] Encounters (KIRSTIN) health maintenance testing (CRS, Breast Ca, Diabetic Eye Exam)   Orders entered:N/A

## 2020-11-04 ENCOUNTER — CLINICAL SUPPORT (OUTPATIENT)
Dept: DIABETES | Facility: CLINIC | Age: 70
End: 2020-11-04
Payer: MEDICARE

## 2020-11-04 ENCOUNTER — OFFICE VISIT (OUTPATIENT)
Dept: INTERNAL MEDICINE | Facility: CLINIC | Age: 70
End: 2020-11-04
Payer: MEDICARE

## 2020-11-04 VITALS
HEART RATE: 75 BPM | WEIGHT: 293 LBS | HEIGHT: 65 IN | OXYGEN SATURATION: 99 % | SYSTOLIC BLOOD PRESSURE: 126 MMHG | DIASTOLIC BLOOD PRESSURE: 80 MMHG | BODY MASS INDEX: 48.82 KG/M2

## 2020-11-04 DIAGNOSIS — E11.42 TYPE 2 DIABETES MELLITUS WITH DIABETIC POLYNEUROPATHY, WITH LONG-TERM CURRENT USE OF INSULIN: ICD-10-CM

## 2020-11-04 DIAGNOSIS — I10 ESSENTIAL HYPERTENSION: ICD-10-CM

## 2020-11-04 DIAGNOSIS — S00.502S: Primary | ICD-10-CM

## 2020-11-04 DIAGNOSIS — Z79.4 TYPE 2 DIABETES MELLITUS WITH DIABETIC POLYNEUROPATHY, WITH LONG-TERM CURRENT USE OF INSULIN: ICD-10-CM

## 2020-11-04 DIAGNOSIS — K05.10: Primary | ICD-10-CM

## 2020-11-04 PROCEDURE — 99999 PR PBB SHADOW E&M-EST. PATIENT-LVL V: CPT | Mod: PBBFAC,,, | Performed by: INTERNAL MEDICINE

## 2020-11-04 PROCEDURE — 99999 PR PBB SHADOW E&M-EST. PATIENT-LVL I: CPT | Mod: PBBFAC,,,

## 2020-11-04 PROCEDURE — 99999 PR PBB SHADOW E&M-EST. PATIENT-LVL V: ICD-10-PCS | Mod: PBBFAC,,, | Performed by: INTERNAL MEDICINE

## 2020-11-04 PROCEDURE — 99214 OFFICE O/P EST MOD 30 MIN: CPT | Mod: S$PBB,,, | Performed by: INTERNAL MEDICINE

## 2020-11-04 PROCEDURE — 99211 OFF/OP EST MAY X REQ PHY/QHP: CPT | Mod: PBBFAC,27 | Performed by: DIETITIAN, REGISTERED

## 2020-11-04 PROCEDURE — 99999 PR PBB SHADOW E&M-EST. PATIENT-LVL I: ICD-10-PCS | Mod: PBBFAC,,,

## 2020-11-04 PROCEDURE — 99215 OFFICE O/P EST HI 40 MIN: CPT | Mod: PBBFAC | Performed by: INTERNAL MEDICINE

## 2020-11-04 PROCEDURE — G0108 DIAB MANAGE TRN  PER INDIV: HCPCS | Mod: PBBFAC | Performed by: DIETITIAN, REGISTERED

## 2020-11-04 PROCEDURE — 99214 PR OFFICE/OUTPT VISIT, EST, LEVL IV, 30-39 MIN: ICD-10-PCS | Mod: S$PBB,,, | Performed by: INTERNAL MEDICINE

## 2020-11-04 NOTE — PROGRESS NOTES
"Subjective:       Patient ID: Annika Mac is a 69 y.o. female.    Chief Complaint: Follow-up   This is a 69-year-old who presents today for follow-up she reports that she was recently having some issues with her teeth went to urgent care and was found to have a infection in the gum she reports that her dentures have been rubbing on her gums and she thinks that once cause the symptoms she has been keeping her dentures out with improvement until today than she put them back in she does have follow-up with her dentist planned.  Patient reports she was treated with Augmentin which she completed the course of antibiotic and was using some topical mouthwash as well her symptoms to get better she reports they told her she had a bit of fluid in her ears at that time but she is not having any pain currently.  She reports she was feeling a bit fatigued and tired during the time she was taking the antibiotic but no other neurologic symptoms .  She has continued to follow with her endocrinologist for her diabetes control remains on high-dose insulin    HPI  Review of Systems   Constitutional: Positive for fatigue.   HENT:        Recent dental infection   Resolved with treatment    Cardiovascular: Negative for chest pain.       Objective:     Blood pressure 126/80, pulse 75, height 5' 5" (1.651 m), weight (!) 141.5 kg (312 lb), SpO2 99 %.    Physical Exam  Constitutional:       General: She is not in acute distress.  HENT:      Head: Normocephalic.      Comments: Gum irritated but no drainage of infection noted   Eyes:      General: No scleral icterus.  Neck:      Musculoskeletal: Neck supple.   Cardiovascular:      Rate and Rhythm: Normal rate and regular rhythm.      Heart sounds: Murmur present. No friction rub. No gallop.    Pulmonary:      Effort: Pulmonary effort is normal. No respiratory distress.      Breath sounds: Normal breath sounds.   Abdominal:      General: Bowel sounds are normal.      Palpations: Abdomen is " soft. There is no mass.      Tenderness: There is no abdominal tenderness.   Musculoskeletal:      Comments: Dry skin  Shoulder prominnce  Left hand in splint    Skin:     Findings: No erythema.      Comments: Dry skin    Neurological:      Mental Status: She is alert.         Assessment:       1. Superficial injury of gum with infection, sequela    2. Type 2 diabetes mellitus with diabetic polyneuropathy, with long-term current use of insulin    3. Essential hypertension        Plan:       Annika was seen today for follow-up.    Diagnoses and all orders for this visit:    Superficial injury of gum with infection, sequela  Infection seems to be resolved she still has some irritation in her gums and advised her to keep her dentures out or use some protection in the meantime until she discussed with her dentist she completed a course of antibiotic    Type 2 diabetes mellitus with diabetic polyneuropathy, with long-term current use of insulin  History of continues to follow with endocrinology    Essential hypertension  Has been controlled

## 2020-11-04 NOTE — PROGRESS NOTES
"Diabetes Education  Author: Irena Hendricks RD, CDE  Date: 11/4/2020    Diabetes Care Management Summary  Diabetes Education Record Progress: Comprehensive    Current Diabetes Risk Level: Low     Last A1c:   Lab Results   Component Value Date    HGBA1C 7.2 (H) 09/30/2020     Last Visit with Diabetes Educator:  7/7/2020    Diabetes Type : Type II  Diabetes Diagnosis: >10 years      Current Treatment: Insulin, Injectable  Novolin 70/30  -  110 units  in AM, 85 units at lunch, 65 units at dinner;   Ozempic weekly 0.5 mg weekly  (to increase to 1.0 mg weekly)            Reviewed Problem List with Patient: Yes    Health Maintenance was reviewed today with patient. Discussed with patient importance of routine eye exams, foot exams/foot care, blood work (i.e.: A1c, microalbumin, and lipid), dental visits, yearly flu vaccine, and pneumonia vaccine as indicated by PCP. Patient verbalized understanding.     Health Maintenance Topics with due status: Not Due       Topic Last Completion Date    DEXA SCAN 06/09/2017    TETANUS VACCINE 08/24/2018    Eye Exam 12/20/2019    Foot Exam 07/28/2020    Lipid Panel 09/08/2020    Hemoglobin A1c 09/30/2020    Colorectal Cancer Screening 10/14/2020    Mammogram 11/02/2020    High Dose Statin 11/04/2020     Health Maintenance Due   Topic Date Due    Shingles Vaccine (2 of 2) 11/03/2020       Nutrition  Meal Planning: 3 meals per day  What type of sweetener do you use?: Equal  What type of beverages do you drink?: diet soda/tea, water, juice, milk   (u/s tea with Equal; "lindsey aid" which she says is SF with Equal; Diet Ginger Ale; 4 oz OJ a few times weekly; 1 or 2% milk)    Meal Plan 24 Hour Recall - Breakfast: cereal OR grits, eggs, [place] phan (8am)  Meal Plan 24 Hour Recall - Lunch: beans, rice, baked chicken or fish, veggie (1pm)  Meal Plan 24 Hour Recall - Dinner: similar to lunch (6pm)    Meal Plan 24 Hour Recall - Snack: only snacks when BG drops        Monitoring   Self Monitoring : " "SMBG 3-4 times daily  Blood Glucose Logs: Yes   (-300;  pre-lunch: 120-210;  pre-dinner: 120-210)    Do you use a personal continuous glucose monitor?: Yes  (currently doing fingersticks as she is out of dexcom sensors)  What kind of glucose monitor do you use?: Dexcom    In the last month, how often have you had a low blood sugar reaction?: never        Exercise   Exercise Type: none      Social History  Preferred Learning Method: Face to Face, Demonstration, Hands On, Reading Materials  Primary Support: Self, Spouse  Smoking Status: Never a Smoker  Barriers to Change: None  Learning Challenges : None  Readiness to Learn : Acceptance  Cultural Influences: No      Diabetes Education Assessment/Progress  Nutrition (Incorporating nutritional management into one's lifestyle): Discussion, Instructed, Individual Session, Written Materials Provided, Comprehends Key Points       Emphasized importance of eliminating all SSB. Discussed SF drink options. Discussed carb vs non-carb foods and reviewed appropriate amounts of carbs to have at meals vs snacks. Recommended 30-45 gm carb at meals and 0-10 gm carb at snacks. Instructed on appropriate label reading and serving sizes of specific carb containing foods. Reviewed need to limit total/saturated fats despite lesser effect on BG increase. Encouraged carb sources primarily from whole grains, fresh/frozen fruits, and low-fat milk and yogurt. Discussed meal plans and snack ideas amenable to pt.     Medications (states correct name, dose, onset, peak, duration, side effects & timing of meds): Discussion, Instructed, Individual Session, Written Materials Provided, Comprehends Key Points       Reviewed timing and MOA of 70/30 insulin; reviewed rotation of sites and appropriate insulin storage.     She has some occasions on BG logs where she skips her dinner time dose if her BG is "low" - skipping completely when BG is 114 or 117. Showed her that her fasting numbers the next " "day tend to be very high when she does this (>300). Discussed she needs some insulin at dinner even if BG "lower." Per MD note, instructed pt to decrease ANY dose by 20 units if her pre-meal BG is <140 mg/dL.     Also discussed absorption of high doses. Encouraged splitting each dose in half and giving one injection on one side of the abdomen, and the other injection on the other side of the abdomen. She is already having to stick herself twice at each injection time as her 70/30 pen only dials to max 60 units. I instructed her to give   AM: 60 units on R side, 50 units on L side  Noon: 40 units on R side, 45 units on L side  Evenin units on R side, 35 units on L side.     Monitoring (monitoring blood glucose/other parameters & using results): Discussion, Instructed, Individual Session, Written Materials Provided, Comprehends Key Points       She has been out of Dexcom supplies since about August this year. Dexcom company used to supply her, but since they stopped distributing she was told to switch to [employer] and now having trouble getting supplies from [employer].  She brought notes with updated phone and fax numbers to give to ms Cano today.             Goals  Patient has selected/evaluated goals during today's session: Yes, evaluated    Healthy Eating: In Progress  (Goal to keep carb portions at 2-3 per meal)  Start Date: 20  Target Date: 21         Diabetes Care Plan/Intervention  Education Plan/Intervention: Individual Follow-Up DSMT  (f/u with me after resuming Dexcom for at least 1 week)          Diabetes Meal Plan  Restrictions: Restricted Carbohydrate          Today's Self-Management Care Plan was developed with the patient's input and is based on barriers identified during today's assessment.    The long and short-term goals in the care plan were written with the patient/caregiver's input. The patient has agreed to work toward these goals to improve her overall diabetes control.  "     The patient received a copy of today's self-management plan and verbalized understanding of the care plan, goals, and all of today's instructions.      The patient was encouraged to communicate with her physician and care team regarding her condition(s) and treatment.  I provided the patient with my contact information today and encouraged her to contact me via phone or patient portal as needed.           Education Units of Time   Time Spent: 60 min

## 2020-11-11 ENCOUNTER — TELEPHONE (OUTPATIENT)
Dept: ENDOCRINOLOGY | Facility: CLINIC | Age: 70
End: 2020-11-11

## 2020-11-17 ENCOUNTER — OFFICE VISIT (OUTPATIENT)
Dept: PODIATRY | Facility: CLINIC | Age: 70
End: 2020-11-17
Payer: MEDICARE

## 2020-11-17 VITALS
WEIGHT: 293 LBS | HEART RATE: 73 BPM | DIASTOLIC BLOOD PRESSURE: 64 MMHG | BODY MASS INDEX: 48.82 KG/M2 | HEIGHT: 65 IN | SYSTOLIC BLOOD PRESSURE: 148 MMHG

## 2020-11-17 DIAGNOSIS — I12.9 TYPE 2 DM WITH CKD STAGE 4 AND HYPERTENSION: Primary | ICD-10-CM

## 2020-11-17 DIAGNOSIS — N18.4 TYPE 2 DM WITH CKD STAGE 4 AND HYPERTENSION: Primary | ICD-10-CM

## 2020-11-17 DIAGNOSIS — L84 CORN OR CALLUS: ICD-10-CM

## 2020-11-17 DIAGNOSIS — B35.1 ONYCHOMYCOSIS DUE TO DERMATOPHYTE: ICD-10-CM

## 2020-11-17 DIAGNOSIS — E11.22 TYPE 2 DM WITH CKD STAGE 4 AND HYPERTENSION: Primary | ICD-10-CM

## 2020-11-17 PROCEDURE — 11056 PARNG/CUTG B9 HYPRKR LES 2-4: CPT | Mod: PBBFAC | Performed by: PODIATRIST

## 2020-11-17 PROCEDURE — 11056 PARNG/CUTG B9 HYPRKR LES 2-4: CPT | Mod: Q9,S$PBB,, | Performed by: PODIATRIST

## 2020-11-17 PROCEDURE — 11721 PR DEBRIDEMENT OF NAILS, 6 OR MORE: ICD-10-PCS | Mod: 59,Q9,S$PBB, | Performed by: PODIATRIST

## 2020-11-17 PROCEDURE — 99214 PR OFFICE/OUTPT VISIT, EST, LEVL IV, 30-39 MIN: ICD-10-PCS | Mod: 25,S$PBB,, | Performed by: PODIATRIST

## 2020-11-17 PROCEDURE — 99215 OFFICE O/P EST HI 40 MIN: CPT | Mod: PBBFAC | Performed by: PODIATRIST

## 2020-11-17 PROCEDURE — 11721 DEBRIDE NAIL 6 OR MORE: CPT | Mod: PBBFAC,59 | Performed by: PODIATRIST

## 2020-11-17 PROCEDURE — 99999 PR PBB SHADOW E&M-EST. PATIENT-LVL V: CPT | Mod: PBBFAC,,, | Performed by: PODIATRIST

## 2020-11-17 PROCEDURE — 99999 PR PBB SHADOW E&M-EST. PATIENT-LVL V: ICD-10-PCS | Mod: PBBFAC,,, | Performed by: PODIATRIST

## 2020-11-17 PROCEDURE — 11721 DEBRIDE NAIL 6 OR MORE: CPT | Mod: 59,Q9,S$PBB, | Performed by: PODIATRIST

## 2020-11-17 PROCEDURE — 99214 OFFICE O/P EST MOD 30 MIN: CPT | Mod: 25,S$PBB,, | Performed by: PODIATRIST

## 2020-11-17 PROCEDURE — 11056 PR TRIM BENIGN HYPERKERATOTIC SKIN LESION,2-4: ICD-10-PCS | Mod: Q9,S$PBB,, | Performed by: PODIATRIST

## 2020-11-17 RX ORDER — DICLOFENAC SODIUM 10 MG/G
2 GEL TOPICAL 4 TIMES DAILY
Qty: 1 TUBE | Refills: 2 | Status: SHIPPED | OUTPATIENT
Start: 2020-11-17 | End: 2022-01-14

## 2020-11-17 RX ORDER — PREGABALIN 150 MG/1
150 CAPSULE ORAL 2 TIMES DAILY
Qty: 180 CAPSULE | Refills: 3 | Status: SHIPPED | OUTPATIENT
Start: 2020-11-17 | End: 2021-07-08

## 2020-11-17 RX ORDER — DICLOFENAC SODIUM 10 MG/G
2 GEL TOPICAL 4 TIMES DAILY
Qty: 1 TUBE | Refills: 2 | Status: SHIPPED | OUTPATIENT
Start: 2020-11-17 | End: 2020-11-17

## 2020-11-17 RX ORDER — KETOCONAZOLE 20 MG/G
CREAM TOPICAL DAILY
Qty: 1 TUBE | Refills: 2 | Status: SHIPPED | OUTPATIENT
Start: 2020-11-17 | End: 2021-01-04 | Stop reason: ALTCHOICE

## 2020-11-20 NOTE — PROGRESS NOTES
Subjective:      Patient ID: Annika Mac is a 69 y.o. female.    Chief Complaint: Diabetes Mellitus (11/4/20 dr mamie cotton) and Nail Care    Annika is a 69 y.o. female who presents to the clinic for evaluation and treatment of high risk feet. Annika has a past medical history of Allergy, Anemia (7/27/2012), Anticoagulant long-term use, Arthritis, Asthma, CHF (congestive heart failure), CKD (chronic kidney disease) stage 3, GFR 30-59 ml/min (7/27/2012), Clotting disorder, Colon polyp, Deep vein thrombophlebitis of left leg (7/27/2012), Degenerative disc disease, Diabetic peripheral neuropathy associated with type 2 diabetes mellitus (7/27/2012), GERD (gastroesophageal reflux disease), HTN (hypertension) (7/27/2012), Hyperlipidemia (7/27/2012), Lead-induced gout of ankle or foot, Nonproliferative diabetic retinopathy of right eye (7/27/2012), Obesity, Obstructive sleep apnea (7/27/2012), Osteoporosis, Proliferative diabetic retinopathy of left eye (7/27/2012), Stroke (8/1/2003), Type 2 diabetes mellitus with diabetic polyneuropathy (7/27/2012), and Ulcer. The patient's chief complaint is long, thick toenails and calluses on both feet . No other major pedal complaintsThis patient has documented high risk feet requiring routine maintenance secondary to diabetes mellitis and those secondary complications of diabetes, as mentioned..  In addition she is here for routine diabetic foot evaluation as well as routine care and increasing painful calluses to both heels.  She is also here for diabetic education as well as an issue with right ankle pain and increased nerve pain specifically at nighttime.  PCP: Mamie Cotton MD    Date Last Seen by PCP:   Chief Complaint   Patient presents with    Diabetes Mellitus     11/4/20 dr mamie cotton    Nail Care           Chief Complaint   Patient presents with    Diabetes Mellitus     11/4/20 dr mamie cotton    Nail Care       Current shoe gear: black leather DM shoes  w/ custom inserts     Hemoglobin A1C   Date Value Ref Range Status   09/30/2020 7.2 (H) 4.0 - 5.6 % Final     Comment:     ADA Screening Guidelines:  5.7-6.4%  Consistent with prediabetes  >or=6.5%  Consistent with diabetes  High levels of fetal hemoglobin interfere with the HbA1C  assay. Heterozygous hemoglobin variants (HbS, HgC, etc)do  not significantly interfere with this assay.   However, presence of multiple variants may affect accuracy.     07/02/2020 6.7 (H) 4.0 - 5.6 % Final     Comment:     ADA Screening Guidelines:  5.7-6.4%  Consistent with prediabetes  >or=6.5%  Consistent with diabetes  High levels of fetal hemoglobin interfere with the HbA1C  assay. Heterozygous hemoglobin variants (HbS, HgC, etc)do  not significantly interfere with this assay.   However, presence of multiple variants may affect accuracy.     03/13/2020 7.8 (H) 4.0 - 5.6 % Final     Comment:     ADA Screening Guidelines:  5.7-6.4%  Consistent with prediabetes  >or=6.5%  Consistent with diabetes  High levels of fetal hemoglobin interfere with the HbA1C  assay. Heterozygous hemoglobin variants (HbS, HgC, etc)do  not significantly interfere with this assay.   However, presence of multiple variants may affect accuracy.         Review of Systems   Constitution: Negative for chills, decreased appetite and fever.   Cardiovascular: Negative for leg swelling.   Skin: Positive for dry skin and nail changes. Negative for color change, flushing, itching, poor wound healing, rash and skin cancer.   Musculoskeletal: Positive for arthritis. Negative for back pain, gout, joint pain, joint swelling and myalgias.   Gastrointestinal: Negative for nausea and vomiting.   Neurological: Positive for numbness. Negative for loss of balance and paresthesias.           Objective:      Physical Exam   Constitutional: She is oriented to person, place, and time. She appears well-developed and well-nourished. No distress.   Cardiovascular:   Dorsalis pedis and  posterior tibial pulses are diminished Bilaterally. Toes are cool to touch. Feet are warm proximally.There is decreased digital hair. Skin is atrophic, slightly hyperpigmented, and mildly edematous       Musculoskeletal: Normal range of motion. She exhibits no edema, tenderness or deformity.   Equinus noted b/l ankles, gastroc. Adequate joint range of motion without pain, limitation, nor crepitation Bilateral pedal joints. Muscle strength is 5/5 in all groups bilaterally.  Tenderness to the right anterior lateral ankle joint.        Neurological: She is alert and oriented to person, place, and time.   [place] 5.07 monofilamant testing is diminished [place] feet. Sharp/dull sensation diminished Bilaterally. Light touch absent Bilaterally.       Skin: Skin is warm, dry and intact. No abrasion, no bruising, no burn, no ecchymosis, no laceration, no lesion, no petechiae and no rash noted. She is not diaphoretic. No pallor.   Nails 1-5 b/l  are elongated by  2-6 mm's, thickened by 3-5 mm's, dystrophic, and are darkened in  coloration . Xerosis Bilaterally. No open lesions noted.      Hyperkeratotic tissue noted to distal 2-4 toe tips b/l.  dry skin with tender fissures noted to the posterior plantar aspect of bilateral heels.  Psychiatric: She has a normal mood and affect. Thought content normal.   Nursing note and vitals reviewed.            Assessment:       Encounter Diagnoses   Name Primary?    Type 2 DM with CKD stage 4 and hypertension Yes    Onychomycosis due to dermatophyte     Corn or callus          Plan:       Annika was seen today for diabetes mellitus and nail care.    Diagnoses and all orders for this visit:    Type 2 DM with CKD stage 4 and hypertension  -     DIABETIC SHOES FOR HOME USE    Onychomycosis due to dermatophyte    Corn or callus    Other orders  -     Discontinue: diclofenac sodium (VOLTAREN) 1 % Gel; Apply 2 g topically 4 (four) times daily.  -     diclofenac sodium (VOLTAREN) 1 % Gel;  Apply 2 g topically 4 (four) times daily.  -     ketoconazole (NIZORAL) 2 % cream; Apply topically once daily.  -     pregabalin (LYRICA) 150 MG capsule; Take 1 capsule (150 mg total) by mouth 2 (two) times daily.          Routine Foot Care    Performed by:  Jean Carlos Pacheco. DPM  Authorized by:  Patient     Consent Done?:  Yes (Verbal)     Nail Care Type:  Debride  Location(s): All  (Left 1st Toe, Left 3rd Toe, Left 2nd Toe, Left 4th Toe, Left 5th Toe, Right 1st Toe, Right 2nd Toe, Right 3rd Toe, Right 4th Toe and Right 5th Toe)  Patient tolerance:  Patient tolerated the procedure well with no immediate complications     With patient's permission, the toenails mentioned above were aggressively reduced and debrided using a nail nipper, removing all offending nail and debris. The patient will continue to monitor the areas daily, inspect the feet, wear protective shoe gear when ambulatory, and moisturizer to maintain skin integrity.      Callus Care Type: Debride    With patient's permission, the calluses/hyperkeratotic lesions mentioned above were aggressively reduced and debrided using a number 15 blade. The patient will continue to monitor the areas daily, inspect the feet, wear protective shoe gear when ambulatory, and moisturizer to maintain skin integrity.       I counseled the patient on her conditions, their implications and medical management.        - Shoe inspection. Diabetic Foot Education. Patient reminded of the importance of good nutrition and blood sugar control to help prevent podiatric complications of diabetes. Patient instructed on proper foot hygeine. We discussed wearing proper shoe gear, daily foot inspections, never walking without protective shoe gear, never putting sharp instruments to feet, routine podiatric nail visits every 3 months.

## 2020-12-11 ENCOUNTER — PATIENT MESSAGE (OUTPATIENT)
Dept: OTHER | Facility: OTHER | Age: 70
End: 2020-12-11

## 2020-12-14 ENCOUNTER — LAB VISIT (OUTPATIENT)
Dept: LAB | Facility: HOSPITAL | Age: 70
End: 2020-12-14
Attending: INTERNAL MEDICINE
Payer: MEDICARE

## 2020-12-14 DIAGNOSIS — D64.9 ANEMIA, UNSPECIFIED TYPE: ICD-10-CM

## 2020-12-14 LAB
BASOPHILS # BLD AUTO: 0.03 K/UL (ref 0–0.2)
BASOPHILS NFR BLD: 0.5 % (ref 0–1.9)
DIFFERENTIAL METHOD: ABNORMAL
EOSINOPHIL # BLD AUTO: 0.4 K/UL (ref 0–0.5)
EOSINOPHIL NFR BLD: 6.3 % (ref 0–8)
ERYTHROCYTE [DISTWIDTH] IN BLOOD BY AUTOMATED COUNT: 15.5 % (ref 11.5–14.5)
FERRITIN SERPL-MCNC: 938 NG/ML (ref 20–300)
HCT VFR BLD AUTO: 32.2 % (ref 37–48.5)
HGB BLD-MCNC: 10.4 G/DL (ref 12–16)
IMM GRANULOCYTES # BLD AUTO: 0.02 K/UL (ref 0–0.04)
IMM GRANULOCYTES NFR BLD AUTO: 0.3 % (ref 0–0.5)
LYMPHOCYTES # BLD AUTO: 1.5 K/UL (ref 1–4.8)
LYMPHOCYTES NFR BLD: 24.5 % (ref 18–48)
MCH RBC QN AUTO: 29.2 PG (ref 27–31)
MCHC RBC AUTO-ENTMCNC: 32.3 G/DL (ref 32–36)
MCV RBC AUTO: 90 FL (ref 82–98)
MONOCYTES # BLD AUTO: 0.6 K/UL (ref 0.3–1)
MONOCYTES NFR BLD: 9.9 % (ref 4–15)
NEUTROPHILS # BLD AUTO: 3.6 K/UL (ref 1.8–7.7)
NEUTROPHILS NFR BLD: 58.5 % (ref 38–73)
NRBC BLD-RTO: 0 /100 WBC
PLATELET # BLD AUTO: 248 K/UL (ref 150–350)
PMV BLD AUTO: 11.9 FL (ref 9.2–12.9)
RBC # BLD AUTO: 3.56 M/UL (ref 4–5.4)
WBC # BLD AUTO: 6.07 K/UL (ref 3.9–12.7)

## 2020-12-14 PROCEDURE — 36415 COLL VENOUS BLD VENIPUNCTURE: CPT | Mod: PO

## 2020-12-14 PROCEDURE — 85025 COMPLETE CBC W/AUTO DIFF WBC: CPT | Mod: PO

## 2020-12-14 PROCEDURE — 82728 ASSAY OF FERRITIN: CPT

## 2020-12-14 NOTE — PROGRESS NOTES
Subjective:       Patient ID: Annika Mac is a 70 y.o. female.    Chief Complaint: Consult    CC:    Current attempts at weight loss: New pt to me, referred by Kal Love MD  [address] [place] [address] , with Patient Active Problem List:     Type 2 diabetes mellitus with diabetic polyneuropathy, with long-term current use of insulin     Obstructive sleep apnea     History of stroke     Type 2 DM with CKD stage 4 and hypertension     Epiretinal membrane     Gout, chronic     Morbid obesity with BMI of 50.0-59.9, adult     PAD (peripheral artery disease)     Lumbar facet arthropathy     Cataract fragments in eye following cataract surgery     PCO (posterior capsular opacification)     Peripheral autonomic neuropathy due to DM     Diastolic dysfunction     Chronic back pain     Lumbar spinal stenosis     Essential hypertension     Dyslipidemia     Primary osteoarthritis of right knee     Cough syncope     GERD (gastroesophageal reflux disease)     Candidiasis, cutaneous     Osteoarthritis of spine with radiculopathy, cervical region     Radicular pain in left arm     Uncontrolled type 2 diabetes mellitus with both eyes affected by proliferative retinopathy without macular edema, with long-term current use of insulin     Iron deficiency anemia     Secondary hyperparathyroidism     Elbow pain, left     Stage 4 chronic kidney disease     Obesity, diabetes, and hypertension syndrome     Ectatic aorta     Anemia in chronic kidney disease (CKD)     Hypoglycemia unawareness associated with type 2 diabetes mellitus     Constipation     Unspecified inflammatory spondylopathy, lumbar region     Cervical myelopathy     Left renal mass     Decreased ROM of left shoulder     Muscle weakness of left arm     Pain in both hands     Decreased range of motion of left thumb     Type 2 diabetes mellitus with obesity     Type 2 diabetes mellitus with peripheral artery disease     Anemia of renal disease     Lab  "Results       Component                Value               Date                       HGBA1C                   7.2 (H)             09/30/2020                 HGBA1C                   6.7 (H)             07/02/2020                 HGBA1C                   7.8 (H)             03/13/2020            Lab Results       Component                Value               Date                       LDLCALC                  69.8                09/08/2020                 CREATININE               1.8 (H)             10/14/2020                 States "cutting back". Can walk only a few feet before stopping.     Previous diet attempts: denies ,    History of medication for loss: denies.   checked today     Heaviest weight: 316#    Lightest weight: 140#    Goal weight: 180#      Last eye exam:    12/19. No glaucoma                                   Provider:    Typical eating patterns:  Lives with . They share in cooking.   Breakfast: grits, egg, sausage georgie.     Lunch: rice, beans, chicken breast and salad.     Dinner: similar to lunch. [place] and salad.      Snacks: jello, george crackers, cake and ice cream.     Beverages:. Milk. OJ. Water, diet soda, ginger ale. Denies ETOH     Willingness to change:  10/10    Cardiac studies:    Normal sinus rhythm   Normal ECG   When compared with ECG of 09-FEB-2017 09:06,   No significant change was found     BMR: 1837    PBF:  52.7%    Review of Systems   Constitutional: Negative for chills and fever.   Respiratory: Positive for apnea. Negative for shortness of breath.         + PSG in past. Does not have a CPAP currently.    Cardiovascular: Positive for leg swelling. Negative for chest pain.   Gastrointestinal: Negative for constipation, diarrhea and reflux.        On PPI   Genitourinary: Negative for difficulty urinating and dysuria.   Musculoskeletal: Positive for arthralgias and back pain.   Neurological: Negative for dizziness and light-headedness.   Psychiatric/Behavioral: " "Negative for dysphoric mood. The patient is not nervous/anxious.          Objective:     BP (!) 122/56   Pulse 86   Ht 5' 5" (1.651 m)   Wt (!) 143.7 kg (316 lb 12.8 oz)   SpO2 99%   BMI 52.72 kg/m²     Physical Exam  Vitals signs reviewed.   Constitutional:       General: She is not in acute distress.     Appearance: She is well-developed.      Comments: Morbidly obese     HENT:      Head: Normocephalic and atraumatic.   Eyes:      General: No scleral icterus.     Pupils: Pupils are equal, round, and reactive to light.   Neck:      Musculoskeletal: Normal range of motion and neck supple.      Thyroid: No thyromegaly.   Cardiovascular:      Rate and Rhythm: Normal rate.      Heart sounds: Normal heart sounds. No murmur. No friction rub. No gallop.    Pulmonary:      Effort: Pulmonary effort is normal. No respiratory distress.      Breath sounds: Normal breath sounds. No wheezing.   Abdominal:      General: Bowel sounds are normal. There is no distension.      Palpations: Abdomen is soft.      Tenderness: There is no abdominal tenderness.   Musculoskeletal: Normal range of motion.         General: Swelling present.      Comments: 2+ b/l pitting edema   Lymphadenopathy:      Cervical: No cervical adenopathy.   Skin:     General: Skin is warm and dry.      Findings: No erythema.   Neurological:      Mental Status: She is alert and oriented to person, place, and time.      Cranial Nerves: No cranial nerve deficit.   Psychiatric:         Behavior: Behavior normal.         Judgment: Judgment normal.         Assessment:       1. Class 3 severe obesity due to excess calories with serious comorbidity and body mass index (BMI) of 50.0 to 59.9 in adult    2. Type 2 diabetes mellitus with diabetic polyneuropathy, with long-term current use of insulin    3. Obstructive sleep apnea    4. Stage 4 chronic kidney disease    5. Primary osteoarthritis of right knee    6. Spinal stenosis of lumbar region, unspecified whether " neurogenic claudication present    7. Dyslipidemia    8. Essential hypertension        Plan:           1. Class 3 severe obesity due to excess calories with serious comorbidity and body mass index (BMI) of 50.0 to 59.9 in adult  Patient was informed that topiramate is used for migraine prevention and seizures. Weight loss is a common side effect that is well documented. S/he understands this. S/he was informed of the potential side effects such as serious and possibly fatal rash in which case the medication should be discontinued immediately. Paresthesias, forgetfulness, fatigue, kidney stones, GI symptoms, and changes in lab values such as electrolytes, blood counts and kidney function.      Start topiramate  in the evening for 1 week, then morning and evening.     1200 moisés veg menu, meal ideas and seated exercise handout given.     2. Type 2 diabetes mellitus with diabetic polyneuropathy, with long-term current use of insulin    Pt advised to check blood sugar regularly as medications may need to be adjusted with changes in diet and weight loss.     - Ambulatory referral/consult to Bariatric Medicine    3. Obstructive sleep apnea  Discussed importance of compliance with treatment, and that RUFINA does require getting closer to normal BMI range to see improvement that some other co-morbidities. Referred to Sleep med for  CPAP.   - Ambulatory referral/consult to Sleep Disorders; Future    4. Stage 4 chronic kidney disease  PB diet as above.     5. Primary osteoarthritis of right knee  Increase low impact activity as tolerated.  Avoid high impact activity, very heavy lifting or other exercise regimens that may cause discomfort.      6. Spinal stenosis of lumbar region, unspecified whether neurogenic claudication present  Increase low impact activity as tolerated.  Avoid high impact activity, very heavy lifting or other exercise regimens that may cause discomfort.      7. Dyslipidemia  Recheck after 10% TBW lost.  Expect  improvement with weight loss.     8. Essential hypertension  The current medical regimen is effective;  continue present plan and medications. Expect improvement with weight loss.

## 2020-12-15 ENCOUNTER — OFFICE VISIT (OUTPATIENT)
Dept: HEMATOLOGY/ONCOLOGY | Facility: CLINIC | Age: 70
End: 2020-12-15
Payer: MEDICARE

## 2020-12-15 ENCOUNTER — INITIAL CONSULT (OUTPATIENT)
Dept: BARIATRICS | Facility: CLINIC | Age: 70
End: 2020-12-15
Payer: MEDICARE

## 2020-12-15 ENCOUNTER — OFFICE VISIT (OUTPATIENT)
Dept: ORTHOPEDICS | Facility: CLINIC | Age: 70
End: 2020-12-15
Payer: MEDICARE

## 2020-12-15 ENCOUNTER — TELEPHONE (OUTPATIENT)
Dept: PREADMISSION TESTING | Facility: HOSPITAL | Age: 70
End: 2020-12-15

## 2020-12-15 VITALS
OXYGEN SATURATION: 99 % | SYSTOLIC BLOOD PRESSURE: 122 MMHG | HEIGHT: 65 IN | WEIGHT: 293 LBS | DIASTOLIC BLOOD PRESSURE: 56 MMHG | BODY MASS INDEX: 48.82 KG/M2 | HEART RATE: 86 BPM

## 2020-12-15 VITALS
SYSTOLIC BLOOD PRESSURE: 128 MMHG | OXYGEN SATURATION: 96 % | BODY MASS INDEX: 48.82 KG/M2 | HEART RATE: 78 BPM | DIASTOLIC BLOOD PRESSURE: 62 MMHG | RESPIRATION RATE: 16 BRPM | WEIGHT: 293 LBS | TEMPERATURE: 98 F | HEIGHT: 65 IN

## 2020-12-15 DIAGNOSIS — M48.061 SPINAL STENOSIS OF LUMBAR REGION, UNSPECIFIED WHETHER NEUROGENIC CLAUDICATION PRESENT: ICD-10-CM

## 2020-12-15 DIAGNOSIS — D64.9 ANEMIA, UNSPECIFIED TYPE: Primary | ICD-10-CM

## 2020-12-15 DIAGNOSIS — E66.01 CLASS 3 SEVERE OBESITY DUE TO EXCESS CALORIES WITH SERIOUS COMORBIDITY AND BODY MASS INDEX (BMI) OF 50.0 TO 59.9 IN ADULT: Primary | ICD-10-CM

## 2020-12-15 DIAGNOSIS — G47.33 OBSTRUCTIVE SLEEP APNEA: Chronic | ICD-10-CM

## 2020-12-15 DIAGNOSIS — I10 ESSENTIAL HYPERTENSION: ICD-10-CM

## 2020-12-15 DIAGNOSIS — E78.5 DYSLIPIDEMIA: ICD-10-CM

## 2020-12-15 DIAGNOSIS — M18.12 ARTHRITIS OF CARPOMETACARPAL (CMC) JOINT OF LEFT THUMB: Primary | ICD-10-CM

## 2020-12-15 DIAGNOSIS — Z79.4 TYPE 2 DIABETES MELLITUS WITH DIABETIC POLYNEUROPATHY, WITH LONG-TERM CURRENT USE OF INSULIN: ICD-10-CM

## 2020-12-15 DIAGNOSIS — N18.4 STAGE 4 CHRONIC KIDNEY DISEASE: ICD-10-CM

## 2020-12-15 DIAGNOSIS — Z01.818 PREOP TESTING: Primary | ICD-10-CM

## 2020-12-15 DIAGNOSIS — E11.42 TYPE 2 DIABETES MELLITUS WITH DIABETIC POLYNEUROPATHY, WITH LONG-TERM CURRENT USE OF INSULIN: ICD-10-CM

## 2020-12-15 DIAGNOSIS — M65.4 DE QUERVAIN'S TENOSYNOVITIS, LEFT: ICD-10-CM

## 2020-12-15 DIAGNOSIS — M17.11 PRIMARY OSTEOARTHRITIS OF RIGHT KNEE: ICD-10-CM

## 2020-12-15 PROCEDURE — 99214 PR OFFICE/OUTPT VISIT, EST, LEVL IV, 30-39 MIN: ICD-10-PCS | Mod: S$PBB,,, | Performed by: ORTHOPAEDIC SURGERY

## 2020-12-15 PROCEDURE — 99214 PR OFFICE/OUTPT VISIT, EST, LEVL IV, 30-39 MIN: ICD-10-PCS | Mod: S$PBB,ICN,, | Performed by: INTERNAL MEDICINE

## 2020-12-15 PROCEDURE — 99214 OFFICE O/P EST MOD 30 MIN: CPT | Mod: S$PBB,,, | Performed by: ORTHOPAEDIC SURGERY

## 2020-12-15 PROCEDURE — 99999 PR PBB SHADOW E&M-EST. PATIENT-LVL IV: CPT | Mod: PBBFAC,,, | Performed by: ORTHOPAEDIC SURGERY

## 2020-12-15 PROCEDURE — 99215 OFFICE O/P EST HI 40 MIN: CPT | Mod: PBBFAC,25 | Performed by: INTERNAL MEDICINE

## 2020-12-15 PROCEDURE — 99999 PR PBB SHADOW E&M-EST. PATIENT-LVL V: CPT | Mod: PBBFAC,,, | Performed by: INTERNAL MEDICINE

## 2020-12-15 PROCEDURE — 99213 OFFICE O/P EST LOW 20 MIN: CPT | Mod: S$PBB,,, | Performed by: INTERNAL MEDICINE

## 2020-12-15 PROCEDURE — 99214 OFFICE O/P EST MOD 30 MIN: CPT | Mod: S$PBB,ICN,, | Performed by: INTERNAL MEDICINE

## 2020-12-15 PROCEDURE — 99214 OFFICE O/P EST MOD 30 MIN: CPT | Mod: PBBFAC,27 | Performed by: ORTHOPAEDIC SURGERY

## 2020-12-15 PROCEDURE — 99999 PR PBB SHADOW E&M-EST. PATIENT-LVL IV: ICD-10-PCS | Mod: PBBFAC,,, | Performed by: ORTHOPAEDIC SURGERY

## 2020-12-15 PROCEDURE — 99999 PR PBB SHADOW E&M-EST. PATIENT-LVL V: ICD-10-PCS | Mod: PBBFAC,,, | Performed by: INTERNAL MEDICINE

## 2020-12-15 PROCEDURE — 99215 OFFICE O/P EST HI 40 MIN: CPT | Mod: PBBFAC,27 | Performed by: INTERNAL MEDICINE

## 2020-12-15 PROCEDURE — 99213 PR OFFICE/OUTPT VISIT, EST, LEVL III, 20-29 MIN: ICD-10-PCS | Mod: S$PBB,,, | Performed by: INTERNAL MEDICINE

## 2020-12-15 RX ORDER — TOPIRAMATE 25 MG/1
25 TABLET ORAL 2 TIMES DAILY
Qty: 60 TABLET | Refills: 0 | Status: SHIPPED | OUTPATIENT
Start: 2020-12-15 | End: 2021-03-15 | Stop reason: SDUPTHER

## 2020-12-15 RX ORDER — HUMAN INSULIN 100 [IU]/ML
INJECTION, SUSPENSION SUBCUTANEOUS
Status: ON HOLD | COMMUNITY
Start: 2020-10-16 | End: 2021-01-08 | Stop reason: SDUPTHER

## 2020-12-15 RX ORDER — MUPIROCIN 20 MG/G
OINTMENT TOPICAL
Status: CANCELLED | OUTPATIENT
Start: 2020-12-15

## 2020-12-15 RX ORDER — LABETALOL 300 MG/1
300 TABLET, FILM COATED ORAL 2 TIMES DAILY
Qty: 180 TABLET | Refills: 3 | Status: SHIPPED | OUTPATIENT
Start: 2020-12-15 | End: 2021-02-24 | Stop reason: SDUPTHER

## 2020-12-15 NOTE — LETTER
December 15, 2020      Kal Love MD  [address]  [facility] [address]           [facility] 2nd Fl  [address]  [facility] [address]  Phone: [phone]  Fax: [phone]          Patient: Annika Mac   MR Number: [id number]   YOB: 1950   Date of Visit: 12/15/2020       Dear Dr. Kal Love:    Thank you for referring Annika Mac to me for evaluation. Attached you will find relevant portions of my assessment and plan of care.    If you have questions, please do not hesitate to call me. I look forward to following Annika Mac along with you.    Sincerely,    Shari Ritter MD    Enclosure  CC:  No Recipients    If you would like to receive this communication electronically, please contact [email or web address] or [phone] to request more information on [facility] [employer] access.    For providers and/or their staff who would like to refer a patient to [facility], please contact us through our one-stop-shop provider referral line, [facility], at [phone].    If you feel you have received this communication in error or would no longer like to receive these types of communications, please e-mail [email or web address]

## 2020-12-15 NOTE — PROGRESS NOTES
Subjective:       Patient ID: Annika Mac is a 70 y.o. female.    Chief Complaint: Anemia, unspecified type    Anemia       Mrs. Mac is a 68 yo morbidly obese female who presents today for follow up for her longstanding anemia.  Since her last visit she received 2 injector for infusions by the Nephrology Service.  Multiple stool samples in the past have been negative for occult blood.   Of note, she had an EGD, colonoscopy, and video capsule swallow 2 years ago.  A tubular adenoma was removed from her colon while her EGD had shown patchy mildly erythematous mucosa without bleeding in the gastric body.  Biopsies were negative for malignancy.  The video capsule swallow was unremarkable although it was not an optimal study.    His CBC today shows a white count of 6,000 per cubic mm, hemoglobin 10.4 grams/deciliter, hematocrit 32.2%, MCV 90, and platelets are 481,000 per cubic mm.  Her ferritin is 938 ng/mL.    Review of Systems  Overall she feels fair. She has longstanding arthritic pains which are long-standing.  She denies any anxiety, depression, easy bruising, fevers, chills, night sweats, weight loss, nausea, vomiting, diarrhea, diplopia, blurred vision, epistaxis, hematuria, or headaches.      Objective:      Physical Exam  GENERAL: She is alert, oriented to time, place, person, pleasant, well nourished, in no acute physical distress. Presents on a wheelchair.   VITAL SIGNS: Reviewed.   HEENT: Normal. There are no nasal, oral, lip, gingival, auricular, lid, conjunctival lesions. Pupils are equal, reactive to light and   accommodation and extraocular muscle movements are intact. Mucosae are moist and pink, and there is no thrush.   NECK: Supple without JVD, adenopathy, or thyromegaly.   LUNGS: Clear to auscultation without wheezing, rales, or rhonchi.   CARDIOVASCULAR: Reveals an S1, S2, no murmurs, no rubs, no gallops.   ABDOMEN: Obese, soft, nontender, without organomegaly. Bowel sounds are present. A median  abdominal scar is seen extending from the umbilicus to the symphysis pubis.   EXTREMITIES: No cyanosis or clubbing. There is 1(+) edema at the ankle level bilaterally.   SKIN: Does not have petechiae, rashes, induration, or ecchymoses.   NEUROLOGIC: Motor function is 5/5, DTRs are 0 to 1+ bilaterally, symmetrical, and cranial nerves within normal limits.   LYMPHATIC: There is no cervical, axillary, or supraclavicular adenopathy.       Assessment:       1. Anemia in stage 3 chronic kidney disease      2. Morbid obesity due to excess calories     3. 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           Plan:     I had a long discussion with Mrs. Mac.  I suspect that her anemia is multifactorial but primarily secondary to her underlying renal dysfunction.  I again recommended that she consider a bone marrow biopsy.  She has not made up her mind on that yet.  I will see her again in 12 weeks.  I have asked her to submit 3 more stool samples at the time of her next visit.  Her multiple questions were answered to her satisfaction.

## 2020-12-15 NOTE — H&P
[facility]  History & Physical  Orthopedics     SUBJECTIVE:       COVID-19 attestation:  This patient was treated during the COVID-19 pandemic.  This was discussed with the patient, they are aware of our current policies and procedures, were given the option of delaying their visit and or switching to a virtual visit, delaying their surgery when applicable, and they elect to proceed.     Chief Complaint: Left  Thumb pain     Referring Provider: No ref. provider found      History of Present Illness:  Patient is a 70 y.o. right hand dominant female who presents today for follow-up in regards to left-sided thumb CMC arthritis as well as left de Quervain tenosynovitis.  She has really been suffering with this and I have been following her for some time now.  She has attempted occupational therapy as well as corticosteroid injections and bracing and activity modifications with minimal relief.  She is suffering and functional use of the left upper extremity is impaired.  She has difficulty performing ADLs and she would like to discuss surgical options today.  No new complaints.        The patient is retired.     Onset of symptoms/DOI greater than 5 years.     Symptoms are aggravated by activity and movement.     Symptoms are alleviated by rest, immobilization and medication.     Symptoms consist of pain, swelling and deformity.     The patient rates their pain as a 6/10.     Attempted treatment(s) and/or interventions include rest, activity modification, anti-inflammatory medications and splinting/casting.     The patient denies any fevers, chills, N/V, D/C and presents for evaluation.             Past Medical History:   Diagnosis Date    Allergy      Anemia 7/27/2012    Anticoagulant long-term use      Arthritis      Asthma      CHF (congestive heart failure)      CKD (chronic kidney disease) stage 3, GFR 30-59 ml/min 7/27/2012    Clotting disorder      Colon polyp      Deep vein  thrombophlebitis of left leg 2012    Degenerative disc disease      Diabetic peripheral neuropathy associated with type 2 diabetes mellitus 2012    GERD (gastroesophageal reflux disease)      HTN (hypertension) 2012    Hyperlipidemia 2012    Lead-induced gout of ankle or foot       right going on three weeks    Nonproliferative diabetic retinopathy of right eye 2012    Obesity      Obstructive sleep apnea 2012    Osteoporosis      Proliferative diabetic retinopathy of left eye 2012    Stroke 2003    Type 2 diabetes mellitus with diabetic polyneuropathy 2012    Ulcer              Past Surgical History:   Procedure Laterality Date    CATARACT EXTRACTION W/  INTRAOCULAR LENS IMPLANT Left 3/2002     left     CATARACT EXTRACTION W/  INTRAOCULAR LENS IMPLANT Right      OD dr. olivares     SECTION        COLONOSCOPY N/A 2018     Procedure: COLONOSCOPY;  Surgeon: Faizan Mac MD;  Location: [facility] ([address] [place] [address]);  Service: Endoscopy;  Laterality: N/A;    CYST REMOVAL   2011     left side of face    CYSTOSCOPY W/ RETROGRADES Left 2020     Procedure: CYSTOSCOPY, WITH RETROGRADE PYELOGRAM;  Surgeon: Noman Rivas MD;  Location: [address];  Service: Urology;  Laterality: Left;  30min    EYE SURGERY Bilateral 2012     eye implants    GANGLION CYST EXCISION   2007     Right wrist    JOINT REPLACEMENT Left      Pan Retinal Photocoagulation Bilateral       Dr. Monterroso (Proliferative Diabetic Retinopathy)    TOTAL ABDOMINAL HYSTERECTOMY   1982    TOTAL KNEE ARTHROPLASTY   2006     left    TRIGGER FINGER RELEASE   2009           Review of patient's allergies indicates:   Allergen Reactions    Iodinated contrast media Rash      Social History          Social History Narrative     , lives with family; 3 children, 2 grandchildren            Family History   Problem Relation Age of Onset    Diabetes  "Mother      Hypertension Mother      Heart disease Father      Diabetes Sister      Thyroid disease Brother      Diabetes Brother      Hypertension Brother      No Known Problems Daughter      No Known Problems Son      No Known Problems Daughter      Amblyopia Neg Hx      Blindness Neg Hx      Glaucoma Neg Hx      Macular degeneration Neg Hx      Retinal detachment Neg Hx      Strabismus Neg Hx      Colon cancer Neg Hx      Esophageal cancer Neg Hx              Current Outpatient Medications:     albuterol (PROAIR HFA) 90 mcg/actuation inhaler, Inhale 2 puffs into the lungs every 6 (six) hours as needed for Wheezing. Rescue, Disp: 18 g, Rfl: 6    allopurinoL (ZYLOPRIM) 300 MG tablet, TAKE 1 TABLET DAILY, Disp: 90 tablet, Rfl: 3    amLODIPine (NORVASC) 10 MG tablet, Take 1 tablet (10 mg total) by mouth once daily., Disp: 90 tablet, Rfl: 3    ammonium lactate 12 % Crea, Apply twice daily to affected parts both feet as needed., Disp: 140 g, Rfl: 11    aspirin 81 MG Chew, Take 1 tablet (81 mg total) by mouth once daily., Disp: , Rfl: 0    BD ULTRA-FINE KIKA PEN NEEDLE 32 gauge x 5/32" Ndle, To use 4 times daily, Disp: 200 each, Rfl: 20    betamethasone dipropionate (DIPROLENE) 0.05 % cream, Apply topically 2 (two) times daily. Prn hand rash, Disp: 45 g, Rfl: 0    blood sugar diagnostic Strp, 1 each by Misc.(Non-Drug; Combo Route) route 3 (three) times daily. Dx code  E11.42 . Contour Next, Disp: 200 each, Rfl: 12    blood-glucose meter kit, Use as instructed, true test/result meter, Disp: 1 each, Rfl: 0    blood-glucose meter kit, 1 each by Other route once daily. Use daily. Insurance proffered one touch  E11.42 (Patient taking differently: 1 each by Other route once daily. Contour next .  Insurance proffered one touch  E11.42), Disp: 1 each, Rfl: 0    blood-glucose sensor (DEXCOM G6 SENSOR) Terese, Use as Directed, Disp: 2 Device, Rfl: 11    blood-glucose transmitter (DEXCOM G6 TRANSMITTER) " Terese, Every 3 months. Use as Directed to check blood glucose., Disp: 1 Device, Rfl: 3    COLCRYS 0.6 mg tablet, TAKE 1 TABLET EVERY OTHER DAY (Patient taking differently: daily as needed. ), Disp: 45 tablet, Rfl: 3    diclofenac sodium (VOLTAREN) 1 % Gel, Apply 2 g topically 4 (four) times daily., Disp: 1 Tube, Rfl: 2    DULoxetine (CYMBALTA) 30 MG capsule, TAKE 1 CAPSULE DAILY, Disp: 90 capsule, Rfl: 3    fexofenadine (ALLEGRA) 180 MG tablet, TAKE 1 TABLET BY MOUTH ONCE DAILY, Disp: 30 tablet, Rfl: 0    fluocinonide (LIDEX) 0.05 % external solution, AAA scalp qday - bid prn pruritus, Disp: 60 mL, Rfl: 3    hydrocortisone (ANUSOL-HC) 2.5 % rectal cream, Place rectally 2 (two) times daily as needed for Hemorrhoids., Disp: 28 g, Rfl: 1    insulin NPH-insulin regular, 70/30, (NOVOLIN 70/30 U-100 INSULIN) 100 unit/mL (70-30) injection, 120 UNITS INTO THE SKIN WITH BREAKFAST, 110 UNITS WITH LUNCH, AND INJECT 90 UNITS WITH DINNER ; ., Disp: 30 mL, Rfl: 3    irbesartan (AVAPRO) 300 MG tablet, Take 1 tablet (300 mg total) by mouth every evening., Disp: 90 tablet, Rfl: 3    ketoconazole (NIZORAL) 2 % cream, Apply topically once daily., Disp: 1 Tube, Rfl: 2    labetalol (NORMODYNE) 300 MG tablet, Take 1 tablet (300 mg total) by mouth 2 (two) times daily., Disp: 180 tablet, Rfl: 3    lancets 31 gauge Misc, 1 lancet by Misc.(Non-Drug; Combo Route) route 4 (four) times daily. E11.42 . Prescribed one touch, Disp: 200 each, Rfl: 6    lancets 33 gauge Misc, 1 lancet by Misc.(Non-Drug; Combo Route) route 4 (four) times daily. Dx code E11.42, Disp: 200 each, Rfl: 12    multivitamin (THERAGRAN) per tablet, Take 1 tablet by mouth once daily., Disp: , Rfl:     NOVOLIN 70-30 FLEXPEN U-100 100 unit/mL (70-30) InPn pen, , Disp: , Rfl:     nystatin (MYCOSTATIN) powder, Apply topically 3 (three) times daily as needed., Disp: 60 g, Rfl: 2    pregabalin (LYRICA) 150 MG capsule, Take 1 capsule (150 mg total) by mouth 2  (two) times daily., Disp: 180 capsule, Rfl: 3    semaglutide (OZEMPIC) 1 mg/dose (2 mg/1.5 mL) PnIj, Inject 0.75 mLs into the skin every 7 days. (1 mg every week), Disp: 9 mL, Rfl: 4    simvastatin (ZOCOR) 40 MG tablet, TAKE 1 TABLET EVERY EVENING, Disp: 90 tablet, Rfl: 3    topiramate (TOPAMAX) 25 MG tablet, Take 1 tablet (25 mg total) by mouth 2 (two) times daily., Disp: 60 tablet, Rfl: 0    chlorthalidone (HYGROTEN) 25 MG Tab, Take 1 tablet (25 mg total) by mouth once daily., Disp: 30 tablet, Rfl: 11    famotidine (PEPCID) 20 MG tablet, Take 1 tablet (20 mg total) by mouth 2 (two) times daily., Disp: 1 tablet, Rfl: 0    glucagon, human recombinant, (GLUCAGON EMERGENCY KIT, HUMAN,) 1 mg SolR, Inject 1 mg into the muscle as needed., Disp: 1 each, Rfl: 3    sodium bicarbonate 325 MG tablet, Take 2 tablets (650 mg total) by mouth 2 (two) times daily., Disp: 120 tablet, Rfl: 11    traMADol (ULTRAM) 50 mg tablet, Take 1 tablet (50 mg total) by mouth every 8 (eight) hours as needed., Disp: 90 tablet, Rfl: 3        Review of Systems:  Constitutional: no fever or chills  Eyes: no visual changes  ENT: no nasal congestion or sore throat  Respiratory: no cough or shortness of breath  Cardiovascular: no chest pain  Gastrointestinal: no nausea or vomiting, tolerating diet  Musculoskeletal: arthralgias and pain     OBJECTIVE:       Vital Signs (Most Recent):  Vitals   There were no vitals filed for this visit.     There is no height or weight on file to calculate BMI.        Physical Exam:  Constitutional: The patient appears well-developed and well-nourished. No distress.   Head: Normocephalic and atraumatic.   Nose: Nose normal.   Eyes: Conjunctivae and EOM are normal.   Neck: No tracheal deviation present.   Cardiovascular: Normal rate and intact distal pulses.    Pulmonary/Chest: Effort normal. No respiratory distress.   Abdominal: There is no guarding.   Neurological: The patient is alert.   Psychiatric: The  patient has a normal mood and affect.      Left Hand/Wrist Examination:     Observation/Inspection:  Swelling                       none                  Deformity                     none  Discoloration               none                  Scars                           none                  Atrophy                        none     HAND/WRIST EXAMINATION:  Finkelstein's Test                                Pos  WHAT Test                                         Pos  Snuff box tenderness                          Neg  Juarez's Test                                     Neg  Hook of Hamate Tenderness              Neg  CMC grind                                           Pos  Circumduction test                              Pos     Neurovascular Exam:  Digits WWP, brisk CR < 3s throughout  NVI motor/LTS to M/R/U nerves, radial pulse 2+  Tinel's Test - Carpal Tunnel                Neg  Tinel's Test - Cubital Tunnel               Neg  Phalen's Test                                      Neg  Median Nerve Compression Test       Neg     ROM hand/wrist/elbow full, painless except with the left thumb as noted above     RRR  CTAB  Abd S/NT/ND +BS     Diagnostic Results:     Xray - New bilateral hand x ray today. No fractures or dislocations appreciated. Bilateral 1st CMC arthritis-left greater than right  EMG - None     ASSESSMENT/PLAN:       Annika Mac is a 70 y.o. female with left thumb CMC arthritis as well as left de Quervain tenosynovitis  Plan:   1) treatment options discussed.  She would like to proceed with a left thumb CMC arthroplasty with FCR harvest and LR TI as well as a concomitant left 1st dorsal compartment release.  We will proceed with this on January 8, 2021.  She will see Dr. Modnragon for clearance prior to surgery.     The patient has not responded to adequate non operative treatment at this time and/or non operative treatment is not indicated. Thus, the risks, benefits and alternatives to surgery were discussed  with the patient in detail.  Specific risks include but are not limited to bleeding, infection, vessel and/or nerve damage, pain, numbness, tingling, compartment syndrome, need for additional surgery, failure to return to pre-injury and/or preoperative functional status, inability to return to work, scar sensitivity, delayed healing, complex regional pain syndrome, weakness, pulley injury, tendon injury, bowstringing, partial and/or incomplete relief of symptoms, persistence of and/or worsening of symptoms, hardware and/or surgical failure, prominent and/or symptomatic hardware possibly necessitating future removal, osteomyelitis, amputation, loss of function, stiffness, rotational malalignment, functional debility, dysfunction, decreased  strength, need for prolonged postoperative rehabilitation, malunion, nonunion, deep venous thrombosis, pulmonary embolism, arthritis and death.  The patient states an understanding and wishes to proceed with surgery.   All questions were answered.  No guarantees were implied or stated.  Written informed consent was obtained.       Marky Hagen M.D.     · Please be aware that this note has been generated with the assistance of annie voice-to-text.  Please excuse any spelling or grammatical errors.

## 2020-12-15 NOTE — PROGRESS NOTES
[facility]  History & Physical  Orthopedics    SUBJECTIVE:      COVID-19 attestation:  This patient was treated during the COVID-19 pandemic.  This was discussed with the patient, they are aware of our current policies and procedures, were given the option of delaying their visit and or switching to a virtual visit, delaying their surgery when applicable, and they elect to proceed.    Chief Complaint: Left  Thumb pain    Referring Provider: No ref. provider found     History of Present Illness:  Patient is a 70 y.o. right hand dominant female who presents today for follow-up in regards to left-sided thumb CMC arthritis as well as left de Quervain tenosynovitis.  She has really been suffering with this and I have been following her for some time now.  She has attempted occupational therapy as well as corticosteroid injections and bracing and activity modifications with minimal relief.  She is suffering and functional use of the left upper extremity is impaired.  She has difficulty performing ADLs and she would like to discuss surgical options today.  No new complaints.      The patient is retired.    Onset of symptoms/DOI greater than 5 years.    Symptoms are aggravated by activity and movement.    Symptoms are alleviated by rest, immobilization and medication.    Symptoms consist of pain, swelling and deformity.    The patient rates their pain as a 6/10.    Attempted treatment(s) and/or interventions include rest, activity modification, anti-inflammatory medications and splinting/casting.     The patient denies any fevers, chills, N/V, D/C and presents for evaluation.       Past Medical History:   Diagnosis Date    Allergy     Anemia 7/27/2012    Anticoagulant long-term use     Arthritis     Asthma     CHF (congestive heart failure)     CKD (chronic kidney disease) stage 3, GFR 30-59 ml/min 7/27/2012    Clotting disorder     Colon polyp     Deep vein thrombophlebitis of left leg  2012    Degenerative disc disease     Diabetic peripheral neuropathy associated with type 2 diabetes mellitus 2012    GERD (gastroesophageal reflux disease)     HTN (hypertension) 2012    Hyperlipidemia 2012    Lead-induced gout of ankle or foot     right going on three weeks    Nonproliferative diabetic retinopathy of right eye 2012    Obesity     Obstructive sleep apnea 2012    Osteoporosis     Proliferative diabetic retinopathy of left eye 2012    Stroke 2003    Type 2 diabetes mellitus with diabetic polyneuropathy 2012    Ulcer      Past Surgical History:   Procedure Laterality Date    CATARACT EXTRACTION W/  INTRAOCULAR LENS IMPLANT Left 3/2002    left     CATARACT EXTRACTION W/  INTRAOCULAR LENS IMPLANT Right     OD dr. olivares     SECTION      COLONOSCOPY N/A 2018    Procedure: COLONOSCOPY;  Surgeon: Faizan Mac MD;  [facility]  Service: Endoscopy;  Laterality: N/A;    CYST REMOVAL  2011    left side of face    CYSTOSCOPY W/ RETROGRADES Left 2020    Procedure: CYSTOSCOPY, WITH RETROGRADE PYELOGRAM;  Surgeon: Noman Rivas MD;  [facility];  Service: Urology;  Laterality: Left;  30min    EYE SURGERY Bilateral 2012    eye implants    GANGLION CYST EXCISION  2007    Right wrist    JOINT REPLACEMENT Left     Pan Retinal Photocoagulation Bilateral     Dr. Monterroso (Proliferative Diabetic Retinopathy)    TOTAL ABDOMINAL HYSTERECTOMY  1982    TOTAL KNEE ARTHROPLASTY  2006    left    TRIGGER FINGER RELEASE  2009     Review of patient's allergies indicates:   Allergen Reactions    Iodinated contrast media Rash     Social History     Social History Narrative    , lives with family; 3 children, 2 grandchildren     Family History   Problem Relation Age of Onset    Diabetes Mother     Hypertension Mother     Heart disease Father     Diabetes Sister      "Thyroid disease Brother     Diabetes Brother     Hypertension Brother     No Known Problems Daughter     No Known Problems Son     No Known Problems Daughter     Amblyopia Neg Hx     Blindness Neg Hx     Glaucoma Neg Hx     Macular degeneration Neg Hx     Retinal detachment Neg Hx     Strabismus Neg Hx     Colon cancer Neg Hx     Esophageal cancer Neg Hx          Current Outpatient Medications:     albuterol (PROAIR HFA) 90 mcg/actuation inhaler, Inhale 2 puffs into the lungs every 6 (six) hours as needed for Wheezing. Rescue, Disp: 18 g, Rfl: 6    allopurinoL (ZYLOPRIM) 300 MG tablet, TAKE 1 TABLET DAILY, Disp: 90 tablet, Rfl: 3    amLODIPine (NORVASC) 10 MG tablet, Take 1 tablet (10 mg total) by mouth once daily., Disp: 90 tablet, Rfl: 3    ammonium lactate 12 % Crea, Apply twice daily to affected parts both feet as needed., Disp: 140 g, Rfl: 11    aspirin 81 MG Chew, Take 1 tablet (81 mg total) by mouth once daily., Disp: , Rfl: 0    BD ULTRA-FINE KIKA PEN NEEDLE 32 gauge x 5/32" Ndle, To use 4 times daily, Disp: 200 each, Rfl: 20    betamethasone dipropionate (DIPROLENE) 0.05 % cream, Apply topically 2 (two) times daily. Prn hand rash, Disp: 45 g, Rfl: 0    blood sugar diagnostic Strp, 1 each by Misc.(Non-Drug; Combo Route) route 3 (three) times daily. Dx code  E11.42 . Contour Next, Disp: 200 each, Rfl: 12    blood-glucose meter kit, Use as instructed, true test/result meter, Disp: 1 each, Rfl: 0    blood-glucose meter kit, 1 each by Other route once daily. Use daily. Insurance proffered one touch  E11.42 (Patient taking differently: 1 each by Other route once daily. Contour next .  Insurance proffered one touch  E11.42), Disp: 1 each, Rfl: 0    blood-glucose sensor (DEXCOM G6 SENSOR) Terese, Use as Directed, Disp: 2 Device, Rfl: 11    blood-glucose transmitter (DEXCOM G6 TRANSMITTER) Terese, Every 3 months. Use as Directed to check blood glucose., Disp: 1 Device, Rfl: 3    COLCRYS 0.6 mg " tablet, TAKE 1 TABLET EVERY OTHER DAY (Patient taking differently: daily as needed. ), Disp: 45 tablet, Rfl: 3    diclofenac sodium (VOLTAREN) 1 % Gel, Apply 2 g topically 4 (four) times daily., Disp: 1 Tube, Rfl: 2    DULoxetine (CYMBALTA) 30 MG capsule, TAKE 1 CAPSULE DAILY, Disp: 90 capsule, Rfl: 3    fexofenadine (ALLEGRA) 180 MG tablet, TAKE 1 TABLET BY MOUTH ONCE DAILY, Disp: 30 tablet, Rfl: 0    fluocinonide (LIDEX) 0.05 % external solution, AAA scalp qday - bid prn pruritus, Disp: 60 mL, Rfl: 3    hydrocortisone (ANUSOL-HC) 2.5 % rectal cream, Place rectally 2 (two) times daily as needed for Hemorrhoids., Disp: 28 g, Rfl: 1    insulin NPH-insulin regular, 70/30, (NOVOLIN 70/30 U-100 INSULIN) 100 unit/mL (70-30) injection, 120 UNITS INTO THE SKIN WITH BREAKFAST, 110 UNITS WITH LUNCH, AND INJECT 90 UNITS WITH DINNER ; ., Disp: 30 mL, Rfl: 3    irbesartan (AVAPRO) 300 MG tablet, Take 1 tablet (300 mg total) by mouth every evening., Disp: 90 tablet, Rfl: 3    ketoconazole (NIZORAL) 2 % cream, Apply topically once daily., Disp: 1 Tube, Rfl: 2    labetalol (NORMODYNE) 300 MG tablet, Take 1 tablet (300 mg total) by mouth 2 (two) times daily., Disp: 180 tablet, Rfl: 3    lancets 31 gauge Misc, 1 lancet by Misc.(Non-Drug; Combo Route) route 4 (four) times daily. E11.42 . Prescribed one touch, Disp: 200 each, Rfl: 6    lancets 33 gauge Misc, 1 lancet by Misc.(Non-Drug; Combo Route) route 4 (four) times daily. Dx code E11.42, Disp: 200 each, Rfl: 12    multivitamin (THERAGRAN) per tablet, Take 1 tablet by mouth once daily., Disp: , Rfl:     NOVOLIN 70-30 FLEXPEN U-100 100 unit/mL (70-30) InPn pen, , Disp: , Rfl:     nystatin (MYCOSTATIN) powder, Apply topically 3 (three) times daily as needed., Disp: 60 g, Rfl: 2    pregabalin (LYRICA) 150 MG capsule, Take 1 capsule (150 mg total) by mouth 2 (two) times daily., Disp: 180 capsule, Rfl: 3    semaglutide (OZEMPIC) 1 mg/dose (2 mg/1.5 mL) PnIj,  Inject 0.75 mLs into the skin every 7 days. (1 mg every week), Disp: 9 mL, Rfl: 4    simvastatin (ZOCOR) 40 MG tablet, TAKE 1 TABLET EVERY EVENING, Disp: 90 tablet, Rfl: 3    topiramate (TOPAMAX) 25 MG tablet, Take 1 tablet (25 mg total) by mouth 2 (two) times daily., Disp: 60 tablet, Rfl: 0    chlorthalidone (HYGROTEN) 25 MG Tab, Take 1 tablet (25 mg total) by mouth once daily., Disp: 30 tablet, Rfl: 11    famotidine (PEPCID) 20 MG tablet, Take 1 tablet (20 mg total) by mouth 2 (two) times daily., Disp: 1 tablet, Rfl: 0    glucagon, human recombinant, (GLUCAGON EMERGENCY KIT, HUMAN,) 1 mg SolR, Inject 1 mg into the muscle as needed., Disp: 1 each, Rfl: 3    sodium bicarbonate 325 MG tablet, Take 2 tablets (650 mg total) by mouth 2 (two) times daily., Disp: 120 tablet, Rfl: 11    traMADol (ULTRAM) 50 mg tablet, Take 1 tablet (50 mg total) by mouth every 8 (eight) hours as needed., Disp: 90 tablet, Rfl: 3      Review of Systems:  Constitutional: no fever or chills  Eyes: no visual changes  ENT: no nasal congestion or sore throat  Respiratory: no cough or shortness of breath  Cardiovascular: no chest pain  Gastrointestinal: no nausea or vomiting, tolerating diet  Musculoskeletal: arthralgias and pain    OBJECTIVE:      Vital Signs (Most Recent):  There were no vitals filed for this visit.  There is no height or weight on file to calculate BMI.      Physical Exam:  Constitutional: The patient appears well-developed and well-nourished. No distress.   Head: Normocephalic and atraumatic.   Nose: Nose normal.   Eyes: Conjunctivae and EOM are normal.   Neck: No tracheal deviation present.   Cardiovascular: Normal rate and intact distal pulses.    Pulmonary/Chest: Effort normal. No respiratory distress.   Abdominal: There is no guarding.   Neurological: The patient is alert.   Psychiatric: The patient has a normal mood and affect.     Left Hand/Wrist  Examination:    Observation/Inspection:  Swelling  none    Deformity  none  Discoloration  none     Scars   none    Atrophy  none    HAND/WRIST EXAMINATION:  Finkelstein's Test   Pos  WHAT Test    Pos  Snuff box tenderness   Neg  Juarez's Test    Neg  Hook of Hamate Tenderness  Neg  CMC grind    Pos  Circumduction test   Pos    Neurovascular Exam:  Digits WWP, brisk CR < 3s throughout  NVI motor/LTS to M/R/U nerves, radial pulse 2+  Tinel's Test - Carpal Tunnel  Neg  Tinel's Test - Cubital Tunnel  Neg  Phalen's Test    Neg  Median Nerve Compression Test Neg    ROM hand/wrist/elbow full, painless except with the left thumb as noted above    RRR  CTAB  Abd S/NT/ND +BS    Diagnostic Results:     Xray - New bilateral hand x ray today. No fractures or dislocations appreciated. Bilateral 1st CMC arthritis-left greater than right  EMG - None    ASSESSMENT/PLAN:      Annika Mac is a 70 y.o. female with left thumb CMC arthritis as well as left de Quervain tenosynovitis  Plan:   1) treatment options discussed.  She would like to proceed with a left thumb CMC arthroplasty with FCR harvest and LR TI as well as a concomitant left 1st dorsal compartment release.  We will proceed with this on January 8, 2021.  She will see Dr. Mondragon for clearance prior to surgery.    The patient has not responded to adequate non operative treatment at this time and/or non operative treatment is not indicated. Thus, the risks, benefits and alternatives to surgery were discussed with the patient in detail.  Specific risks include but are not limited to bleeding, infection, vessel and/or nerve damage, pain, numbness, tingling, compartment syndrome, need for additional surgery, failure to return to pre-injury and/or preoperative functional status, inability to return to work, scar sensitivity, delayed healing, complex regional pain syndrome, weakness, pulley injury, tendon injury, bowstringing, partial and/or incomplete relief of symptoms,  persistence of and/or worsening of symptoms, hardware and/or surgical failure, prominent and/or symptomatic hardware possibly necessitating future removal, osteomyelitis, amputation, loss of function, stiffness, rotational malalignment, functional debility, dysfunction, decreased  strength, need for prolonged postoperative rehabilitation, malunion, nonunion, deep venous thrombosis, pulmonary embolism, arthritis and death.  The patient states an understanding and wishes to proceed with surgery.   All questions were answered.  No guarantees were implied or stated.  Written informed consent was obtained.      Marky Hagen M.D.     Please be aware that this note has been generated with the assistance of [employer] voice-to-text.  Please excuse any spelling or grammatical errors.

## 2020-12-15 NOTE — TELEPHONE ENCOUNTER
----- Message from Cynthia Walsh sent at 12/15/2020 11:25 AM CST -----  Regarding: Preop Clearance  Good afternoon,    The patient is scheduled for a CMC arthroplasty and DeQuervain's release surgery on 1/8/2020.     Medical clearance is indicated prior to this, particularly regarding her Plavix. Would you be able to help the patient in that regard? The anesthesia team will require documentation in the chart regarding clearance status and perioperative medical recommendations.    Thank you for your help & let me know if I can assist in any way!    Cynthia Walsh MS, OTC  Sports Medicine Assistant to Dr. Marky Hagen

## 2020-12-15 NOTE — PATIENT INSTRUCTIONS
"Patient was informed that topiramate is used for migraine prevention and seizures. Weight loss is a common side effect that is well documented. S/he understands this. S/he was informed of the potential side effects such as serious and possibly fatal rash in which case the medication should be discontinued immediately. Paresthesias, forgetfulness, fatigue, kidney stones, GI symptoms, and changes in lab values such as electrolytes, blood counts and kidney function.      Start topiramate  in the evening for 1 week, then morning and evening.     Pt advised to check blood sugar regularly as medications may need to be adjusted with changes in diet and weight loss.             1200 calorie Vegetarian Meal Plan  STARCHES 80 CALORIES PER SERVING 15g CARB, 3g PROTEIN, 1g FAT  Servings per day    bread    tortilla    crackers    cooked cereals    dry cereals    pasta    rice    corn    popcorn    potato (small)    potato, mashed    sweet potato    squash, winter    cooked beans, peas, lentils (add 1 meat exchange)    1 slice    1 (6")    4-6 (3/4 oz)    1/2 cup    3/4 cup    1/2 cup    1/3 cup   1/2 cup    1 small light bag   1 (3 oz)    1/2 cup    1/3 cup    1 cup    1/2 cup  Most starches are a good source of B vitamins    Choose whole grain foods such as 100% whole wheat bread and flour, brown rice, tortillas, etc. for nutrients and fiber.    Combine beans (starch & meat) with grains (starch) for their complimentary proteins and fiber    Combine grains (starch) with milk (milk) or cheese (meat) to compliment proteins     3   FRUIT 60 CALORIES PER SERVING 15g CARB     fresh fruit    banana   melon (cubes)    berries   canned fruit    dried fruit     1 small    1/2   ½ cup    ¾ cup   ½ cup    ¼ cup     Choose whole fruits for fiber    No fruit juices   2   DAIRY  CALORIES PER SERVING 12g CARB, 8g PROTEIN, 0-8g FAT     milk    yogurt   Protein soy or almond milk 1 cup   1 " cup   1 cup  Use unsweetened almond or soy milk with added protein   Avoid chocolate or flavored milk   Avoid yogurt with more than 8 gms of sugar. Gms of protein should be higher than grams of sugar               2   MEAT AND SUBSTITUES  CALORIES PER SERVING 7g Protein, 0-13g FAT     Seafood and fish    cheese    cottage cheese    egg    peanut butter    tofu or tempeh   cooked beans, peas, lentils (add 1 starch)   Quinoa (add 1 starch)    Nuts and seeds (½ serving protein + 1 fat)   Nutritional yeast   Morning Star grillers  1 oz      1 oz   1/4 cup      1    1.5 Tbsp    4 oz (1/2 cup)    1/2 cup               ¼ cup             2 tablespoons     1 georgie or ½ cup       Choose fish, seafood and lower fat cheeses   Limit frying or adding fat.      9   FATS 45 CALORIES PER SERVING      oil    mayonnaise    cream cheese    salad dressing    peanuts    avocado    butter or margarine     1 tsp    1 tsp    1 Tbsp    1 Tbsp    10    1/8    1 tsp  Eat less fat.    Eat less saturated fat such as animal fat found in fatter meat, cheese, and butter. Also eat less hydrogenated fat.      2-3   VEGETABLES 25 CALORIES PER SERVING 5 g CARB, 2g PROTEIN     raw vegetables    cooked vegetables    tomato or vegetable juice  1 cup    1/2 cup      1/2 cup  Choose dark green leafy and deep yellow vegetables such as spinach, zuchinni, squash, mushrooms, cauliflower ,broccoli, carrots, and peppers.   Unlimited       VEGETARIAN 1200 CALORIE MEAL PLAN   Eat 3 small meals per day with 1-2 small snacks to keep you full throughout the day   Aim for at least 15 gms of protein at breakfast, at least 25 grams at lunch and at least 25 grams of protein at dinner.  Snacks should contain at least 5 grams of protein   Limit starches to 1 serving per meal or snack.  Keep your carbs whole grain or whole wheat   Limit your intake of refined sugar including sugary beverages ie sweet tea, lemonade,  fruit punch             Fruit Protein Dairy Starch Fats Calories   Breakfast 1 2 1 1  340   Lunch  3  1 1 290   Dinner 1 4  1 1 405   Snack   1 1  170   Total 2 9 2 4 2 1205     Sample breakfasts:   2 scrambled eggs (use spray), 1 cup Protein Silk soy milk, 1 slice whole wheat toast, 1 small orange   Omelet made with 1 egg, 1-ounce low fat cheese and non-starchy veggies, 1 low fat plain yogurt with ½ banana   Plain yogurt with ¾ cup unsweetened cold cereal and ½ cup fresh fruit salad, 2 hardboiled eggs  Sample lunches:   ½ whole wheat bagel topped with 3 ounces of low fat cheese melted in oven with a wild green salad with 1 TBSP dressing   ¾ cup cooked beans with 6 whole grain crackers, 10 roasted peanuts   ½ medium baked potato with 3 ounces of low fat cheddar cheese and 1 TBSP  sour cream   1 small wheat tortilla brushed with 1 tsp olive oil then brushed with pizza sauce and 4 ounces low fat cheese, sliced mushrooms and green peppers broiled until cheese is melted.  ½ cup chopped melon    Sample Dinners:   4 ounces of tuna pan seared in 1 tsp olive oil, ½ baked small sweet potato and 2 small plums   ½ cup chick peas, 1 cup cauliflower sauteed with 1 tbsp chopped onion, 1 tsp oil, and 2 tsp sal powder. Add low sodium vegetable stock to desired consistency. Serve with ½ cup brown rice   Red beans seasoned with onions and bell peppers served over cauliflower rice   1 cup cooked green or brown lentils served with ½ cup cooked quinoa and chopped tomatoes and cucumbers and 1 tbsp feta cheese  Sample Snacks:   1 cup of Protein Silk Soy milk with 3 squares george crackers   3/4 ounce Triscuits with 1 ounce cubed cheese   Chobani Triple Zero yogurt with ¾ cup unsweetened cereal   ½ cup edamame (shelled)                      Meal Ideas for Regular Bariatric Diet  *Recipes and products available at [email or web address]      Breakfast: (15-20g protein)    - Egg white omelet: 2 egg whites or ½ cup Egg  Beaters. (Optional proteins: cheese, shrimp, black beans, chicken, sliced turkey) (Optional veggies: tomatoes, salsa, spinach, mushrooms, onions, green peppers, or small slice avocado)     - Egg and sausage: 1 egg or ¼ cup Egg Beaters (any variety), with 1 georgie or 2 links of Turkey sausage or Veggie breakfast sausage ([facility] Likeable Local or Arboribus)    - Crust-less breakfast quiche: To make a glass pie dish, mix 4oz part skim Ricotta, 1 cup skim milk, and 2 eggs as your base. Add protein: shredded cheese, sliced lean ham or turkey, turkey phan/sausage. Add veggies: tomato, onion, green onion, mushroom, green pepper, spinach, etc.    - Yogurt parfait: Mix 1 - 6oz container Dannon Light N Fit vanilla yogurt, with ¼ cup Kashi Go Lean cereal    - Cottage cheese and fruit: ½ cup part-skim cottage cheese or ricotta cheese topped with fresh fruit or sugar free preserves     - Avril Batista's Vanilla Egg custard* (add 2 Tbsp instant coffee granules to make Cappuccino Custard*)    - Hi-Protein café latte (skim milk, decaf coffee, 1 scoop protein powder). Optional to add Sugar free syrup or extract flavoring.    Lunch: (20-30g protein)    - ½ cup Black bean soup (Homemade or Progresso), with ¼ cup shredded low-fat cheese. Top with chopped tomato or fresh salsa.     - Lean deli turkey breast and low-fat sliced cheese, mustard or light estrella to moisten, rolled up together, or wrapped in a Luis lettuce leaf    - Chicken salad made from dinner leftovers, moisten with low-fat salad dressing or light estrella. Also try leftover salmon, shrimp, tuna or boiled eggs. Serve ½ cup over dark green salad    - Fat-free canned refried beans, topped with ¼ cup shredded low-fat cheese. Top with chopped tomato or fresh salsa.     - Greek salad: Top mixed greens with 1-2oz grilled chicken, tomatoes, red onions, 2-3 kalamata olives, and sprinkle lightly with feta cheese. Spritz with Balsamic vinegar to taste.     - Crust-less lunch quiche: To make a  glass pie dish, mix 4oz part skim Ricotta, 1 cup skim milk, and 2 eggs as your base. Add protein: shredded cheese, sliced lean ham or turkey, shrimp, chicken. Add veggies: tomato, onion, green onion, mushroom, green pepper, spinach, artichoke, broccoli, etc.    - Pizza bake: tomato sauce, low-fat shredded mozzarella and turkey pepperoni or [place] phan. Add any veggies.    - Cucumber crab bites: Spread ¼ cup crab dip (lump crabmeat + light cream cheese and green onions) over sliced cucumber.     - Chicken with light spinach and artichoke dip*: Puree in : 6oz cooked and drained spinach, 2 cloves garlic, 1 can cannelloni beans, ½ cup chopped green onions, 1 can drained artichoke hearts (not marinated in oil), lemon juice and basil. Mix in 2oz chopped up chicken.    Supper: (20-30g protein)    - Serve grilled fish over dark green salad tossed with low-fat dressing, served with grilled asparagus ryan     - Rotisserie chicken salad: served with sliced strawberries, walnuts, fat-free feta cheese crumbles and 1 tbsp Rudolphs Own Light Raspberry [place] Vinaigrette    - Shrimp cocktail: Dip cold boiled shrimp in homemade low-sugar cocktail sauce (1/2 cup Tommy One Carb ketchup, 2 tbsp horseradish, 1/4 tsp hot sauce, 1 tsp Worcestershire sauce, 1 tbsp freshly-squeezed lemon juice). Serve with dark green salad, walnuts, and crumbled blue cheese drizzled with olive oil and Balsamic vinegar    - Tuna Melt: Spread tuna salad onto 2 thick slices of tomato. Top with low-fat cheese and broil until cheese is melted. May also be made with chicken salad of shrimp salad. [place] with different types of cheeses.    - Homemade low-fat Chili using extra lean ground beef or ground turkey. Top with shredded cheese and salsa as desired. May add dollop fat-free sour cream if desired    - Dinner Omelet with shrimp or chicken and onion, green peppers and chives.    - No noodtheo coffey: Use sliced zucchini or eggplant in  place of noodles.  Layer with part skim ricotta cheese and low sugar meat sauce (use very lean ground beef or ground turkey).    - Mexican chicken bake: Bake chunks of chicken breast or thigh with taco seasoning, Pace brand enchilada sauce, green onions and low-fat cheese. Serve with ¼ cup black beans or fat free refried beans topped with chopped tomatoes or salsa.    - Ave frozen meatballs, simmered in Classico Marinara sauce. Different flavors of salsa or spaghetti sauce create different dishes! Sprinkle with parmesan cheese. Serve with grilled or steamed veggies, or a dark green salad.    - Simmer boneless skinless chicken thigh chunks in Classico Marinara sauce or roasted salsa until tender with chopped onion, bell pepper, garlic, mushrooms, spinach, etc.     - Hamburger, without the bun, dressed the way you like. Served with grilled or steamed veggies.    - Eggplant parmesan: Bake slices of eggplant at 350 degrees for 15 minutes. Layer tomato sauce, sliced eggplant and low-fat mozzarella cheese in a baking dish and cover with foil. Bake 30-40 more minutes or until bubbly. Uncover and bake at 400 degrees for about 15 more minutes, or until top is slightly crisp.    - Fish tacos: grilled/baked white fish, wrapped in Luis lettuce leaf, topped with salsa, shredded low-fat cheese, and light coleslaw.    Snacks: (100-200 calories; >5g protein)    - 1 low-fat cheese stick with 8 cherry tomatoes or 1 serving fresh fruit  - 4 thin slices fat-free turkey breast and 1 slice low-fat cheese  - 4 thin slices fat-free honey ham with wedge of melon  - 1/4 cup unsalted nuts with ½ cup fruit  - 6-oz container Dannon Light n Fit vanilla yogurt, topped with 1oz unsalted nuts         - apple, celery or baby carrots spread with 2 Tbsp natural peanut butter or almond butter   - apple slices with 1 oz slice low-fat cheese  - celery, cucumber, bell pepper or baby carrots dipped in ¼ cup hummus bean spread or light spinach and  artichoke dip (*recipe in lunch section)  - 100 calorie bag microwave light popcorn with 3 tbsp grated parmesan cheese  - Ishan Links Beef Steak - 14g protein! (similar to beef jerky)  - 2 wedges [place] - Light Herb & Garlic Cheese with sliced cucumber or green bell pepper  - 1/2 cup low-fat cottage cheese with ¼ cup fruit or ¼ cup salsa  - RTD Protein drinks: Atkins, Low Carb Slim Fast, EAS light, Muscle Milk Light, etc.  - Homemade Protein drinks: GNC Soy95, Isopure, Nectar, UNJURY, Whey Gourmet, etc. Mix 1 scoop powder with 8oz skim/1% milk or light soymilk.  - Protein bars: Atkins, EAS, Pure Protein, Think Thin, Detour, etc. Must have 0-4 grams sugar - Read the label.    Takeout Options: No more than twice/week  Deli - Salads (no pasta or rice), meats, cheeses. Roasted chicken. Lox (salmon)    Mexican - Platters which don't include tortillas, chips, or rice. Go easy on the beans. Example: Fajitas without the tortillas. Ask the  not to bring chips to the table if they are too tempting.    Greek - Meat or fish and vegetable, but no bread or rice. Including hummus, baba ganoush, etc, is OK. Most sit-down Greek restaurants can provide you with cucumber slices for dipping instead of betty bread.    Fast Food (Avoid as much as possible) - Salads (no croutons and limit salad dressing to 2 tbsp), grilled chicken sandwich without the bun and ask for no estrella. Sharons low fat chili or Taco Bell pintos and cheese.    BBQ - The meats are fine if you ask for sauces on the side, but most of the traditional side dishes are loaded with carbs. Billy slaw, baked beans and BBQ sauce are typically made with sugar.    Chinese - Nothing deep-fried, no rice or noodles. Many Chinese sauces have starch and sugar in them, so you'll have to use your judgement. If you find that these sauces trigger cravings, or cause Dumping, you can ask for the sauce to be made without sugar or just use soy sauce.      SEATED RESISTANCE BAND  EXERCISES     If you do not have a resistance band, or do not feel comfortable using a resistance band, these exercises can also be done holding a light hand weight or water bottle.  If you are just starting to exercise, you may want to go through the motions without any weights or resistance till you become comfortable with the movements.     Do each of the movements shown 10 times (10 repetitions). You can repeat the exercises a second or  third time as well for greater benefit. The amount of tension on the resistance bands should be adjusted so  you can complete one set of 10 repetitions with effort. Increase the tension every few weeks. Do these  exercises 2 or 3 times a week.     * If an exercise hurts your back or joints, stop doing that particular exercise, but keep doing all the others *        CHEST EXERCISE          Start Position:   Sit tall, with feet shoulder width apart and feet in front of knees.   Belly pulled in.   Place the band around your upper back, grasp band in each hand, knuckles (rings) facing front. Arms  should be bent so that knuckles are in front of elbows   Slide shoulder blades down your back and slightly together (as if making a V).   Relax your neck.     To Perform This Exercise:   Press hands forward to lengthen arms using chest muscles (not arms!).   Dont arch your back!)   Return to start position and repeat 10 times.   Breathe!           BACK EXERCISE          Start Position:   Sit tall, with feet shoulder width apart and feet in front of knees.   Belly pulled in.   Grasp band in each hand and raise overhead. Arms should be slightly wider than shoulder width and no  slack in the band.   Slide shoulder blades down your back and slightly together (as if making a V).   Relax your neck.     To Perform This Exercise:   Open arms pulling down towards chest using upper back muscles (not arms!).   Squeeze through shoulder blades at the bottom of the movement (dont  arch your back!).   Return to start position and repeat 10 times.   Breathe!           SHOULDER EXERCISE          Start Position:   Sit tall, with feet shoulder width apart and feet in front of knees.   Belly pulled in.   Sit on band so that you can grasp band in one hand with tension on the band, but not so much tension that  you cannot straighten the arm. It may take a few tries to find the right amount of tension.   Slide shoulder blades down your back and slightly together (as if making a V).   Relax your neck.     To Perform This Exercise:   Press fist to the ceiling, slightly in front of the body.   SLOWLY return to start position and repeat 10 times.   Switch sides and repeat on the other side.   Breathe!           TRICEPS EXERCISE          Start Position:   Sit tall, with feet shoulder width apart and feet in front of knees.   Belly pulled in.   Sit on one end of the band. Grasp other end of band in one hand.   Point elbow directly toward the ceiling (if this is difficult, you may support the upper arm with the opposite  hand)   Be sure there is no slack in the band in the starting position.   Slide shoulder blades down your back and slightly together (as if making a V).   Relax your neck.     To Perform This Exercise:   Extend your arm up to the ceiling, as shown.   Squeeze through shoulder blades at the bottom of the movement (dont arch your back!).   SLOWLY return to start position and repeat 10 times.   Repeat on the other side.   Breathe!           BICEP EXERCISE          Start Position:   Sit tall, with feet shoulder width apart and feet in front of knees.   Belly pulled in.   Place center of exercise band under one foot and step the end of the band under the other foot.   Grasp each end of the band with one hand. Make sure there is no slack between your foot and the hand  that holds the band.   Hold your elbows to your sides.   Pull abdominals in, lift chest, press  shoulders down and back.     To Perform This Exercise:   As you curl up, keep your wrist from changing position in relation to your forearm and your arm stable  from the shoulder to the elbow.   Bend and straighten your elbow in a slow and controlled movement. Repeat this motion 10 times.   Repeat on the other side.   Breathe!

## 2020-12-15 NOTE — LETTER
[facility] - Bariatric Surg [facility]  [address]  [facility] [address]  Phone: [phone]  Fax: [phone] December 15, 2020      Kal Love MD  [address]  [facility] [address]    Patient: Annika Mac   MR Number: [id number]   YOB: 1950   Date of Visit: 12/15/2020     Dear Dr. Love:    Thank you for referring Annika Mac to me for evaluation. Below are the relevant portions of my assessment and plan of care.    Ms. Mac's assessment is as follows:      1. Class 3 severe obesity due to excess calories with serious comorbidity and body mass index (BMI) of 50.0 to 59.9 in adult   2. Type 2 diabetes mellitus with diabetic polyneuropathy, with long-term current use of insulin   3. Obstructive sleep apnea   4. Stage 4 chronic kidney disease   5. Primary osteoarthritis of right knee   6. Spinal stenosis of lumbar region, unspecified whether neurogenic claudication present   7. Dyslipidemia   8. Essential hypertension       Plan:    1. Class 3 severe obesity due to excess calories with serious comorbidity and body mass index (BMI) of 50.0 to 59.9 in adult  Patient was informed that topiramate is used for migraine prevention and seizures. Weight loss is a common side effect that is well documented. S/he understands this. S/he was informed of the potential side effects such as serious and possibly fatal rash in which case the medication should be discontinued immediately. Paresthesias, forgetfulness, fatigue, kidney stones, GI symptoms, and changes in lab values such as electrolytes, blood counts and kidney function.      Start topiramate  in the evening for 1 week, then morning and evening.      1200 moisés veg menu, meal ideas and seated exercise handout given.      2. Type 2 diabetes mellitus with diabetic polyneuropathy, with long-term current use of insulin     The patient was advised to check blood sugar regularly as medications may need to be adjusted with changes in diet and  weight loss.      - Ambulatory referral/consult to Bariatric Medicine     3. Obstructive sleep apnea  Discussed importance of compliance with treatment, and that RUFINA does require getting closer to normal BMI range to see improvement that some other co-morbidities. Referred to Sleep med for  CPAP.   - Ambulatory referral/consult to Sleep Disorders; Future     4. Stage 4 chronic kidney disease  PB diet as above.      5. Primary osteoarthritis of right knee  Increase low impact activity as tolerated.  Avoid high impact activity, very heavy lifting or other exercise regimens that may cause discomfort.       6. Spinal stenosis of lumbar region, unspecified whether neurogenic claudication present  Increase low impact activity as tolerated.  Avoid high impact activity, very heavy lifting or other exercise regimens that may cause discomfort.       7. Dyslipidemia  Recheck after 10% TBW lost.  Expect improvement with weight loss.      8. Essential hypertension  The current medical regimen is effective;  continue present plan and medications. Expect improvement with weight loss.     If you have questions, please do not hesitate to call me. I look forward to following Annika along with you.    Sincerely,      Shari Ritter MD   Section of Bariatric Surgery   Department of Surgery   [facility]    AGF/helio

## 2020-12-21 ENCOUNTER — PATIENT OUTREACH (OUTPATIENT)
Dept: ADMINISTRATIVE | Facility: OTHER | Age: 70
End: 2020-12-21

## 2020-12-21 NOTE — PROGRESS NOTES
Health Maintenance Due   Topic Date Due    Shingles Vaccine (2 of 2) 11/03/2020    Eye Exam  12/20/2020     Updates were requested from care everywhere.  Chart was reviewed for overdue Proactive [facility] Encounters (KIRSTIN) topics (CRS, Breast Cancer Screening, Eye exam)  Health Maintenance has been updated.  LINKS immunization registry triggered.  Immunizations were reconciled.

## 2020-12-23 ENCOUNTER — OFFICE VISIT (OUTPATIENT)
Dept: SLEEP MEDICINE | Facility: CLINIC | Age: 70
End: 2020-12-23
Payer: MEDICARE

## 2020-12-23 VITALS
WEIGHT: 293 LBS | HEIGHT: 65 IN | HEART RATE: 78 BPM | BODY MASS INDEX: 48.82 KG/M2 | SYSTOLIC BLOOD PRESSURE: 127 MMHG | DIASTOLIC BLOOD PRESSURE: 61 MMHG

## 2020-12-23 DIAGNOSIS — Z86.73 HISTORY OF STROKE: ICD-10-CM

## 2020-12-23 DIAGNOSIS — Z79.4 TYPE 2 DIABETES MELLITUS WITH DIABETIC POLYNEUROPATHY, WITH LONG-TERM CURRENT USE OF INSULIN: ICD-10-CM

## 2020-12-23 DIAGNOSIS — I10 ESSENTIAL HYPERTENSION: ICD-10-CM

## 2020-12-23 DIAGNOSIS — N18.4 STAGE 4 CHRONIC KIDNEY DISEASE: ICD-10-CM

## 2020-12-23 DIAGNOSIS — E11.42 TYPE 2 DIABETES MELLITUS WITH DIABETIC POLYNEUROPATHY, WITH LONG-TERM CURRENT USE OF INSULIN: ICD-10-CM

## 2020-12-23 DIAGNOSIS — I73.9 PAD (PERIPHERAL ARTERY DISEASE): ICD-10-CM

## 2020-12-23 DIAGNOSIS — G47.33 OBSTRUCTIVE SLEEP APNEA: Chronic | ICD-10-CM

## 2020-12-23 DIAGNOSIS — G47.33 OSA (OBSTRUCTIVE SLEEP APNEA): Primary | ICD-10-CM

## 2020-12-23 DIAGNOSIS — E66.01 MORBID OBESITY WITH BMI OF 50.0-59.9, ADULT: ICD-10-CM

## 2020-12-23 PROCEDURE — 99202 OFFICE O/P NEW SF 15 MIN: CPT | Mod: S$PBB,,, | Performed by: INTERNAL MEDICINE

## 2020-12-23 PROCEDURE — 99999 PR PBB SHADOW E&M-EST. PATIENT-LVL V: ICD-10-PCS | Mod: PBBFAC,,, | Performed by: INTERNAL MEDICINE

## 2020-12-23 PROCEDURE — 99215 OFFICE O/P EST HI 40 MIN: CPT | Mod: PBBFAC | Performed by: INTERNAL MEDICINE

## 2020-12-23 PROCEDURE — 99999 PR PBB SHADOW E&M-EST. PATIENT-LVL V: CPT | Mod: PBBFAC,,, | Performed by: INTERNAL MEDICINE

## 2020-12-23 PROCEDURE — 99202 PR OFFICE/OUTPT VISIT, NEW, LEVL II, 15-29 MIN: ICD-10-PCS | Mod: S$PBB,,, | Performed by: INTERNAL MEDICINE

## 2020-12-23 NOTE — LETTER
December 23, 2020      Shari Ritter MD  [address]  [facility] [address]           [id number]  [address]  [facility] [address]  Phone: [phone]          Patient: Annika Mac   MR Number: [id number]   YOB: 1950   Date of Visit: 12/23/2020       Dear Dr. Shari Ritter:    Thank you for referring Annika Mac to me for evaluation. Attached you will find relevant portions of my assessment and plan of care.    If you have questions, please do not hesitate to call me. I look forward to following Annika Mac along with you.    Sincerely,    Yenny Casillas MD    Enclosure  CC:  No Recipients    If you would like to receive this communication electronically, please contact [email or web address] or [phone] to request more information on [employer] access.    For providers and/or their staff who would like to refer a patient to [facility], please contact us through our one-stop-shop provider referral line, [facility], at [phone].    If you feel you have received this communication in error or would no longer like to receive these types of communications, please e-mail [email or web address]

## 2020-12-23 NOTE — PROGRESS NOTES
Subjective:       Patient ID: Annika Mac is a 70 y.o. female.    Chief Complaint:   I had the pleasure of seeing Annika Mac today, who is a 70 y.o. female that presents with Obstructive Sleep Apnea.    Annika Mac does not have a CDL.    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    Annika Mac presents with RUFINA that has been going on for 7 years  Split night study 11/2013 showed AHI 23.7 that was reduced to 3.3 at 8 cm H20.   She was on CPAP for about a year.   She felt like her air pressure was not set correctly.       Bedtime when working ranges from NA to NA.   When not working, bedtime ranges from 2200 to 2300.   Sleep latency ranges from 10 to 20 minutes.     Average number of awakenings is 3-4 and return to sleep is quick.   Wake up time when working is NA to NA.   When not working, wake up time is 0800 to 0900.   Patient does feel rested upon awakening.    Annika Mac consumes approximately 0 beverages with caffeine daily.   An average of 0 beverages with alcohol are consumed    Medications taken for sleep currently: none  Previous medications taken: none     Annika Mac does experience daytime sleepiness.   Naps are taken about 0 times weekly, usually lasting NA to NA minutes.  Annika currently does operate an automobile.  Annika Mac does not experience drowsiness when driving.   Patient does doze off when sedentary.   Annika Mac does not have auxiliary symptoms of narcolepsy including sleep onset paralysis, hypnagogic hallucinations, sleep attacks and cataplexy  No flowsheet data found.    Annika Mac has a history of snoring.   Snoring is described as unknown.   Apneic episodes have been noticed during sleep.   A witness to sleep is present.   The patient awakens with mouth dryness.      Annika Mac does not have symptoms of Restless Legs Syndrome. Nocturnal leg movements have not been noticed.   The patient does not experience sleep related leg cramps.   There is a history of parasomnia including  "sleep talking.      Current Outpatient Medications:     albuterol (PROAIR HFA) 90 mcg/actuation inhaler, Inhale 2 puffs into the lungs every 6 (six) hours as needed for Wheezing. Rescue, Disp: 18 g, Rfl: 6    allopurinoL (ZYLOPRIM) 300 MG tablet, TAKE 1 TABLET DAILY, Disp: 90 tablet, Rfl: 3    amLODIPine (NORVASC) 10 MG tablet, Take 1 tablet (10 mg total) by mouth once daily., Disp: 90 tablet, Rfl: 3    ammonium lactate 12 % Crea, Apply twice daily to affected parts both feet as needed., Disp: 140 g, Rfl: 11    aspirin 81 MG Chew, Take 1 tablet (81 mg total) by mouth once daily., Disp: , Rfl: 0    BD ULTRA-FINE KIKA PEN NEEDLE 32 gauge x 5/32" Ndle, To use 4 times daily, Disp: 200 each, Rfl: 20    betamethasone dipropionate (DIPROLENE) 0.05 % cream, Apply topically 2 (two) times daily. Prn hand rash, Disp: 45 g, Rfl: 0    blood sugar diagnostic Strp, 1 each by Misc.(Non-Drug; Combo Route) route 3 (three) times daily. Dx code  E11.42 . Contour Next, Disp: 200 each, Rfl: 12    blood-glucose meter kit, Use as instructed, true test/result meter, Disp: 1 each, Rfl: 0    blood-glucose meter kit, 1 each by Other route once daily. Use daily. Insurance proffered one touch  E11.42 (Patient taking differently: 1 each by Other route once daily. Contour next .  Insurance proffered one touch  E11.42), Disp: 1 each, Rfl: 0    blood-glucose sensor (DEXCOM G6 SENSOR) Terese, Use as Directed, Disp: 2 Device, Rfl: 11    blood-glucose transmitter (DEXCOM G6 TRANSMITTER) Terese, Every 3 months. Use as Directed to check blood glucose., Disp: 1 Device, Rfl: 3    chlorthalidone (HYGROTEN) 25 MG Tab, Take 1 tablet (25 mg total) by mouth once daily., Disp: 30 tablet, Rfl: 11    COLCRYS 0.6 mg tablet, TAKE 1 TABLET EVERY OTHER DAY (Patient taking differently: daily as needed. ), Disp: 45 tablet, Rfl: 3    diclofenac sodium (VOLTAREN) 1 % Gel, Apply 2 g topically 4 (four) times daily., Disp: 1 Tube, Rfl: 2    DULoxetine (CYMBALTA) " 30 MG capsule, TAKE 1 CAPSULE DAILY, Disp: 90 capsule, Rfl: 3    famotidine (PEPCID) 20 MG tablet, Take 1 tablet (20 mg total) by mouth 2 (two) times daily., Disp: 1 tablet, Rfl: 0    fexofenadine (ALLEGRA) 180 MG tablet, TAKE 1 TABLET BY MOUTH ONCE DAILY, Disp: 30 tablet, Rfl: 0    fluocinonide (LIDEX) 0.05 % external solution, AAA scalp qday - bid prn pruritus, Disp: 60 mL, Rfl: 3    glucagon, human recombinant, (GLUCAGON EMERGENCY KIT, HUMAN,) 1 mg SolR, Inject 1 mg into the muscle as needed., Disp: 1 each, Rfl: 3    hydrocortisone (ANUSOL-HC) 2.5 % rectal cream, Place rectally 2 (two) times daily as needed for Hemorrhoids., Disp: 28 g, Rfl: 1    insulin NPH-insulin regular, 70/30, (NOVOLIN 70/30 U-100 INSULIN) 100 unit/mL (70-30) injection, 120 UNITS INTO THE SKIN WITH BREAKFAST, 110 UNITS WITH LUNCH, AND INJECT 90 UNITS WITH DINNER ; ., Disp: 30 mL, Rfl: 3    irbesartan (AVAPRO) 300 MG tablet, Take 1 tablet (300 mg total) by mouth every evening., Disp: 90 tablet, Rfl: 3    ketoconazole (NIZORAL) 2 % cream, Apply topically once daily., Disp: 1 Tube, Rfl: 2    labetaloL (NORMODYNE) 300 MG tablet, Take 1 tablet (300 mg total) by mouth 2 (two) times daily., Disp: 180 tablet, Rfl: 3    lancets 31 gauge Misc, 1 lancet by Misc.(Non-Drug; Combo Route) route 4 (four) times daily. E11.42 . Prescribed one touch, Disp: 200 each, Rfl: 6    lancets 33 gauge Misc, 1 lancet by Misc.(Non-Drug; Combo Route) route 4 (four) times daily. Dx code E11.42, Disp: 200 each, Rfl: 12    multivitamin (THERAGRAN) per tablet, Take 1 tablet by mouth once daily., Disp: , Rfl:     NOVOLIN 70-30 FLEXPEN U-100 100 unit/mL (70-30) InPn pen, , Disp: , Rfl:     nystatin (MYCOSTATIN) powder, Apply topically 3 (three) times daily as needed., Disp: 60 g, Rfl: 2    pregabalin (LYRICA) 150 MG capsule, Take 1 capsule (150 mg total) by mouth 2 (two) times daily., Disp: 180 capsule, Rfl: 3    semaglutide (OZEMPIC) 1 mg/dose (2 mg/1.5  mL) PnIj, Inject 0.75 mLs into the skin every 7 days. (1 mg every week), Disp: 9 mL, Rfl: 4    simvastatin (ZOCOR) 40 MG tablet, TAKE 1 TABLET EVERY EVENING, Disp: 90 tablet, Rfl: 3    sodium bicarbonate 325 MG tablet, Take 2 tablets (650 mg total) by mouth 2 (two) times daily., Disp: 120 tablet, Rfl: 11    topiramate (TOPAMAX) 25 MG tablet, Take 1 tablet (25 mg total) by mouth 2 (two) times daily., Disp: 60 tablet, Rfl: 0    traMADol (ULTRAM) 50 mg tablet, Take 1 tablet (50 mg total) by mouth every 8 (eight) hours as needed., Disp: 90 tablet, Rfl: 3     Review of patient's allergies indicates:   Allergen Reactions    Iodinated contrast media Rash         Past Medical History:   Diagnosis Date    Allergy     Anemia 2012    Anticoagulant long-term use     Arthritis     Asthma     CHF (congestive heart failure)     CKD (chronic kidney disease) stage 3, GFR 30-59 ml/min 2012    Clotting disorder     Colon polyp     Deep vein thrombophlebitis of left leg 2012    Degenerative disc disease     Diabetic peripheral neuropathy associated with type 2 diabetes mellitus 2012    GERD (gastroesophageal reflux disease)     HTN (hypertension) 2012    Hyperlipidemia 2012    Lead-induced gout of ankle or foot     right going on three weeks    Nonproliferative diabetic retinopathy of right eye 2012    Obesity     Obstructive sleep apnea 2012    Osteoporosis     Proliferative diabetic retinopathy of left eye 2012    Stroke 2003    Type 2 diabetes mellitus with diabetic polyneuropathy 2012    Ulcer        Past Surgical History:   Procedure Laterality Date    CATARACT EXTRACTION W/  INTRAOCULAR LENS IMPLANT Left 3/2002    left     CATARACT EXTRACTION W/  INTRAOCULAR LENS IMPLANT Right     OD dr. olivares     SECTION      COLONOSCOPY N/A 2018    Procedure: COLONOSCOPY;  Surgeon: Faizan Mac MD;  Location: [address]  [facility]);  Service: Endoscopy;  Laterality: N/A;    CYST REMOVAL  2/11/2011    left side of face    CYSTOSCOPY W/ RETROGRADES Left 2/26/2020    Procedure: CYSTOSCOPY, WITH RETROGRADE PYELOGRAM;  Surgeon: Noman Rivas MD;  Location: [facility] OR [address] [place] [address];  Service: Urology;  Laterality: Left;  30min    EYE SURGERY Bilateral 2012    eye implants    GANGLION CYST EXCISION  11/13/2007    Right wrist    JOINT REPLACEMENT Left     Pan Retinal Photocoagulation Bilateral     Dr. Monterroso (Proliferative Diabetic Retinopathy)    TOTAL ABDOMINAL HYSTERECTOMY  1982    TOTAL KNEE ARTHROPLASTY  2/2006    left    TRIGGER FINGER RELEASE  9/18/2009       Family History   Problem Relation Age of Onset    Diabetes Mother     Hypertension Mother     Heart disease Father     Diabetes Sister     Thyroid disease Brother     Diabetes Brother     Hypertension Brother     No Known Problems Daughter     No Known Problems Son     No Known Problems Daughter     Amblyopia Neg Hx     Blindness Neg Hx     Glaucoma Neg Hx     Macular degeneration Neg Hx     Retinal detachment Neg Hx     Strabismus Neg Hx     Colon cancer Neg Hx     Esophageal cancer Neg Hx        Social History     Socioeconomic History    Marital status:      Spouse name: Heber    Number of children: 3    Years of education: Not on file    Highest education level: Not on file   Occupational History    Not on file   Social Needs    Financial resource strain: Not on file    Food insecurity     Worry: Not on file     Inability: Not on file    Transportation needs     Medical: Not on file     Non-medical: Not on file   Tobacco Use    Smoking status: Never Smoker    Smokeless tobacco: Never Used   Substance and Sexual Activity    Alcohol use: No    Drug use: No    Sexual activity: Yes     Partners: Male   Lifestyle    Physical activity     Days per week: Not on file     Minutes per session: Not on file    Stress: Not on file   Relationships     Social connections     Talks on phone: Not on file     Gets together: Not on file     Attends Temple service: Not on file     Active member of club or organization: Not on file     Attends meetings of clubs or organizations: Not on file     Relationship status: Not on file   Other Topics Concern    Not on file   Social History Narrative    , lives with family; 3 children, 2 grandchildren           Old medical records.    There were no vitals filed for this visit.           The patient was given open opportunity to ask questions and/or express concerns about treatment plan.   All questions/concerns were discussed.   Driving precautions were provided.     Two patient identifiers used prior to evaluation.    Thank you for referring Annika Mac for evaluation.             Past Medical History:   Diagnosis Date    Allergy     Anemia 2012    Anticoagulant long-term use     Arthritis     Asthma     CHF (congestive heart failure)     CKD (chronic kidney disease) stage 3, GFR 30-59 ml/min 2012    Clotting disorder     Colon polyp     Deep vein thrombophlebitis of left leg 2012    Degenerative disc disease     Diabetic peripheral neuropathy associated with type 2 diabetes mellitus 2012    GERD (gastroesophageal reflux disease)     HTN (hypertension) 2012    Hyperlipidemia 2012    Lead-induced gout of ankle or foot     right going on three weeks    Nonproliferative diabetic retinopathy of right eye 2012    Obesity     Obstructive sleep apnea 2012    Osteoporosis     Proliferative diabetic retinopathy of left eye 2012    Stroke 2003    Type 2 diabetes mellitus with diabetic polyneuropathy 2012    Ulcer      Past Surgical History:   Procedure Laterality Date    CATARACT EXTRACTION W/  INTRAOCULAR LENS IMPLANT Left 3/2002    left     CATARACT EXTRACTION W/  INTRAOCULAR LENS IMPLANT Right     OD dr. olivares      SECTION      COLONOSCOPY N/A 2/26/2018    Procedure: COLONOSCOPY;  Surgeon: Faizan Mac MD;  Location: [facility] ([facility]ND [facility]);  Service: Endoscopy;  Laterality: N/A;    CYST REMOVAL  2/11/2011    left side of face    CYSTOSCOPY W/ RETROGRADES Left 2/26/2020    Procedure: CYSTOSCOPY, WITH RETROGRADE PYELOGRAM;  Surgeon: Noman Rivas MD;  [facility];  Service: Urology;  Laterality: Left;  30min    EYE SURGERY Bilateral 2012    eye implants    GANGLION CYST EXCISION  11/13/2007    Right wrist    JOINT REPLACEMENT Left     Pan Retinal Photocoagulation Bilateral     Dr. Monterroso (Proliferative Diabetic Retinopathy)    TOTAL ABDOMINAL HYSTERECTOMY  1982    TOTAL KNEE ARTHROPLASTY  2/2006    left    TRIGGER FINGER RELEASE  9/18/2009     Family History   Problem Relation Age of Onset    Diabetes Mother     Hypertension Mother     Heart disease Father     Diabetes Sister     Thyroid disease Brother     Diabetes Brother     Hypertension Brother     No Known Problems Daughter     No Known Problems Son     No Known Problems Daughter     Amblyopia Neg Hx     Blindness Neg Hx     Glaucoma Neg Hx     Macular degeneration Neg Hx     Retinal detachment Neg Hx     Strabismus Neg Hx     Colon cancer Neg Hx     Esophageal cancer Neg Hx      Social History     Socioeconomic History    Marital status:      Spouse name: Heber    Number of children: 3    Years of education: Not on file    Highest education level: Not on file   Occupational History    Not on file   Social Needs    Financial resource strain: Not on file    Food insecurity     Worry: Not on file     Inability: Not on file    Transportation needs     Medical: Not on file     Non-medical: Not on file   Tobacco Use    Smoking status: Never Smoker    Smokeless tobacco: Never Used   Substance and Sexual Activity    Alcohol use: No    Drug use: No    Sexual activity: Yes     Partners: Male   Lifestyle    Physical  "activity     Days per week: Not on file     Minutes per session: Not on file    Stress: Not on file   Relationships    Social connections     Talks on phone: Not on file     Gets together: Not on file     Attends Orthodoxy service: Not on file     Active member of club or organization: Not on file     Attends meetings of clubs or organizations: Not on file     Relationship status: Not on file   Other Topics Concern    Not on file   Social History Narrative    , lives with family; 3 children, 2 grandchildren       Current Outpatient Medications   Medication Sig Dispense Refill    albuterol (PROAIR HFA) 90 mcg/actuation inhaler Inhale 2 puffs into the lungs every 6 (six) hours as needed for Wheezing. Rescue 18 g 6    allopurinoL (ZYLOPRIM) 300 MG tablet TAKE 1 TABLET DAILY 90 tablet 3    amLODIPine (NORVASC) 10 MG tablet Take 1 tablet (10 mg total) by mouth once daily. 90 tablet 3    ammonium lactate 12 % Crea Apply twice daily to affected parts both feet as needed. 140 g 11    aspirin 81 MG Chew Take 1 tablet (81 mg total) by mouth once daily.  0    BD ULTRA-FINE KIKA PEN NEEDLE 32 gauge x 5/32" Ndle To use 4 times daily 200 each 20    betamethasone dipropionate (DIPROLENE) 0.05 % cream Apply topically 2 (two) times daily. Prn hand rash 45 g 0    blood sugar diagnostic Strp 1 each by Misc.(Non-Drug; Combo Route) route 3 (three) times daily. Dx code  E11.42 . Contour Next 200 each 12    blood-glucose meter kit Use as instructed, true test/result meter 1 each 0    blood-glucose meter kit 1 each by Other route once daily. Use daily. Insurance proffered one touch  E11.42 (Patient taking differently: 1 each by Other route once daily. Contour next .  Insurance proffered one touch  E11.42) 1 each 0    blood-glucose sensor (DEXCOM G6 SENSOR) Terese Use as Directed 2 Device 11    blood-glucose transmitter (DEXCOM G6 TRANSMITTER) Terese Every 3 months. Use as Directed to check blood glucose. 1 Device 3    " chlorthalidone (HYGROTEN) 25 MG Tab Take 1 tablet (25 mg total) by mouth once daily. 30 tablet 11    COLCRYS 0.6 mg tablet TAKE 1 TABLET EVERY OTHER DAY (Patient taking differently: daily as needed. ) 45 tablet 3    diclofenac sodium (VOLTAREN) 1 % Gel Apply 2 g topically 4 (four) times daily. 1 Tube 2    DULoxetine (CYMBALTA) 30 MG capsule TAKE 1 CAPSULE DAILY 90 capsule 3    famotidine (PEPCID) 20 MG tablet Take 1 tablet (20 mg total) by mouth 2 (two) times daily. 1 tablet 0    fexofenadine (ALLEGRA) 180 MG tablet TAKE 1 TABLET BY MOUTH ONCE DAILY 30 tablet 0    fluocinonide (LIDEX) 0.05 % external solution AAA scalp qday - bid prn pruritus 60 mL 3    glucagon, human recombinant, (GLUCAGON EMERGENCY KIT, HUMAN,) 1 mg SolR Inject 1 mg into the muscle as needed. 1 each 3    hydrocortisone (ANUSOL-HC) 2.5 % rectal cream Place rectally 2 (two) times daily as needed for Hemorrhoids. 28 g 1    insulin NPH-insulin regular, 70/30, (NOVOLIN 70/30 U-100 INSULIN) 100 unit/mL (70-30) injection 120 UNITS INTO THE SKIN WITH BREAKFAST, 110 UNITS WITH LUNCH, AND INJECT 90 UNITS WITH DINNER ; . 30 mL 3    irbesartan (AVAPRO) 300 MG tablet Take 1 tablet (300 mg total) by mouth every evening. 90 tablet 3    ketoconazole (NIZORAL) 2 % cream Apply topically once daily. 1 Tube 2    labetaloL (NORMODYNE) 300 MG tablet Take 1 tablet (300 mg total) by mouth 2 (two) times daily. 180 tablet 3    lancets 31 gauge Misc 1 lancet by Misc.(Non-Drug; Combo Route) route 4 (four) times daily. E11.42 . Prescribed one touch 200 each 6    lancets 33 gauge Misc 1 lancet by Misc.(Non-Drug; Combo Route) route 4 (four) times daily. Dx code E11.42 200 each 12    multivitamin (THERAGRAN) per tablet Take 1 tablet by mouth once daily.      NOVOLIN 70-30 FLEXPEN U-100 100 unit/mL (70-30) InPn pen       nystatin (MYCOSTATIN) powder Apply topically 3 (three) times daily as needed. 60 g 2    pregabalin (LYRICA) 150 MG capsule Take 1  capsule (150 mg total) by mouth 2 (two) times daily. 180 capsule 3    semaglutide (OZEMPIC) 1 mg/dose (2 mg/1.5 mL) PnIj Inject 0.75 mLs into the skin every 7 days. (1 mg every week) 9 mL 4    simvastatin (ZOCOR) 40 MG tablet TAKE 1 TABLET EVERY EVENING 90 tablet 3    sodium bicarbonate 325 MG tablet Take 2 tablets (650 mg total) by mouth 2 (two) times daily. 120 tablet 11    topiramate (TOPAMAX) 25 MG tablet Take 1 tablet (25 mg total) by mouth 2 (two) times daily. 60 tablet 0    traMADol (ULTRAM) 50 mg tablet Take 1 tablet (50 mg total) by mouth every 8 (eight) hours as needed. 90 tablet 3     No current facility-administered medications for this visit.      Review of patient's allergies indicates:   Allergen Reactions    Iodinated contrast media Rash       Review of Systems    Objective:      There were no vitals filed for this visit.  Physical Exam    Lab Review:   BMP:   Lab Results   Component Value Date     (H) 10/14/2020     10/14/2020    K 4.6 10/14/2020     10/14/2020    CO2 24 10/14/2020    BUN 26 (H) 10/14/2020    CREATININE 1.8 (H) 10/14/2020    CALCIUM 9.1 10/14/2020     ABGs:   Lab Results   Component Value Date    PH 7.265 (L) 05/23/2019    PO2 22 (LL) 05/23/2019    PCO2 53.7 (H) 05/23/2019     Diagnostics Review: Echo: Reviewed     Assessment:       1. RUFINA (obstructive sleep apnea)    2. Obstructive sleep apnea    3. Type 2 diabetes mellitus with diabetic polyneuropathy, with long-term current use of insulin    4. History of stroke    5. Morbid obesity with BMI of 50.0-59.9, adult    6. PAD (peripheral artery disease)    7. Essential hypertension    8. Stage 4 chronic kidney disease        Plan:       Due to listed symptoms, a polysomnogram is recommended and ordered.   Description of procedure given to patient.   If significant Obstructive Sleep Apnea (RUFINA) is found during the initial portion of the study, therapy will be initiated with nasal Continuous Positive Airway  Pressure (CPAP).   Goals of therapy were discussed, alternative treatments listed and patient agrees to this form of therapy if indicated.   The pathophysiology of RUFINA was discussed.   The effects of RUFINA on patient's co-morbid conditions and the increased morbidity and/or mortality associated with this condition were reviewed.   The patient was given open opportunity to ask questions and/or express concerns about treatment plan.   All questions/concerns were discussed.   Driving precautions were provided.       Thank you for referring Annika Mac for evaluation.       21-minute visit. >50% spent counseling patient and coordination of care.

## 2020-12-28 ENCOUNTER — TELEPHONE (OUTPATIENT)
Dept: BARIATRICS | Facility: CLINIC | Age: 70
End: 2020-12-28

## 2020-12-28 ENCOUNTER — ANESTHESIA EVENT (OUTPATIENT)
Dept: SURGERY | Facility: HOSPITAL | Age: 70
End: 2020-12-28
Payer: MEDICARE

## 2020-12-28 NOTE — TELEPHONE ENCOUNTER
Returned pt called. Pt inquired about, starting medication -Topamax at the moment. Due to having hand surgery done in 2 days. After gaining  advice. I notified pt.  suggested she Hold-Topamax until after her procedure. Pt verbalized understanding.

## 2020-12-28 NOTE — TELEPHONE ENCOUNTER
----- Message from Ruthie Navarro sent at 12/28/2020 12:14 PM CST -----  Regarding: Medication  Contact: pt  Reason:Calling to speak with staff pertaining to proper medication dosage    Communication:[phone]

## 2021-01-03 PROBLEM — I15.2 OBESITY, DIABETES, AND HYPERTENSION SYNDROME: Status: RESOLVED | Noted: 2019-02-13 | Resolved: 2021-01-03

## 2021-01-03 PROBLEM — E11.51 TYPE 2 DIABETES MELLITUS WITH PERIPHERAL ARTERY DISEASE: Status: RESOLVED | Noted: 2020-08-19 | Resolved: 2021-01-03

## 2021-01-03 PROBLEM — E11.69 OBESITY, DIABETES, AND HYPERTENSION SYNDROME: Status: RESOLVED | Noted: 2019-02-13 | Resolved: 2021-01-03

## 2021-01-03 PROBLEM — E66.9 OBESITY, DIABETES, AND HYPERTENSION SYNDROME: Status: RESOLVED | Noted: 2019-02-13 | Resolved: 2021-01-03

## 2021-01-03 PROBLEM — E11.59 OBESITY, DIABETES, AND HYPERTENSION SYNDROME: Status: RESOLVED | Noted: 2019-02-13 | Resolved: 2021-01-03

## 2021-01-04 ENCOUNTER — INITIAL CONSULT (OUTPATIENT)
Dept: INTERNAL MEDICINE | Facility: CLINIC | Age: 71
End: 2021-01-04
Payer: MEDICARE

## 2021-01-04 ENCOUNTER — PATIENT MESSAGE (OUTPATIENT)
Dept: ADMINISTRATIVE | Facility: HOSPITAL | Age: 71
End: 2021-01-04

## 2021-01-04 ENCOUNTER — LAB VISIT (OUTPATIENT)
Dept: LAB | Facility: HOSPITAL | Age: 71
End: 2021-01-04
Attending: INTERNAL MEDICINE
Payer: MEDICARE

## 2021-01-04 VITALS
OXYGEN SATURATION: 98 % | DIASTOLIC BLOOD PRESSURE: 62 MMHG | HEART RATE: 69 BPM | WEIGHT: 293 LBS | SYSTOLIC BLOOD PRESSURE: 122 MMHG | HEIGHT: 65 IN | BODY MASS INDEX: 48.82 KG/M2 | TEMPERATURE: 98 F

## 2021-01-04 DIAGNOSIS — E11.649 HYPOGLYCEMIA UNAWARENESS ASSOCIATED WITH TYPE 2 DIABETES MELLITUS: ICD-10-CM

## 2021-01-04 DIAGNOSIS — N18.4 ANEMIA IN STAGE 4 CHRONIC KIDNEY DISEASE: ICD-10-CM

## 2021-01-04 DIAGNOSIS — Z86.73 HISTORY OF STROKE: ICD-10-CM

## 2021-01-04 DIAGNOSIS — E11.42 TYPE 2 DIABETES MELLITUS WITH DIABETIC POLYNEUROPATHY, WITH LONG-TERM CURRENT USE OF INSULIN: ICD-10-CM

## 2021-01-04 DIAGNOSIS — Z79.4 TYPE 2 DIABETES MELLITUS WITH DIABETIC POLYNEUROPATHY, WITH LONG-TERM CURRENT USE OF INSULIN: ICD-10-CM

## 2021-01-04 DIAGNOSIS — G89.29 CHRONIC BACK PAIN, UNSPECIFIED BACK LOCATION, UNSPECIFIED BACK PAIN LATERALITY: ICD-10-CM

## 2021-01-04 DIAGNOSIS — E78.5 DYSLIPIDEMIA: Chronic | ICD-10-CM

## 2021-01-04 DIAGNOSIS — K21.9 GASTROESOPHAGEAL REFLUX DISEASE WITHOUT ESOPHAGITIS: ICD-10-CM

## 2021-01-04 DIAGNOSIS — D50.9 IRON DEFICIENCY ANEMIA, UNSPECIFIED IRON DEFICIENCY ANEMIA TYPE: ICD-10-CM

## 2021-01-04 DIAGNOSIS — N18.32 STAGE 3B CHRONIC KIDNEY DISEASE: ICD-10-CM

## 2021-01-04 DIAGNOSIS — I73.9 PAD (PERIPHERAL ARTERY DISEASE): Chronic | ICD-10-CM

## 2021-01-04 DIAGNOSIS — E11.43 DIABETIC AUTONOMIC NEUROPATHY ASSOCIATED WITH TYPE 2 DIABETES MELLITUS: ICD-10-CM

## 2021-01-04 DIAGNOSIS — G47.33 OBSTRUCTIVE SLEEP APNEA: Chronic | ICD-10-CM

## 2021-01-04 DIAGNOSIS — M54.9 CHRONIC BACK PAIN, UNSPECIFIED BACK LOCATION, UNSPECIFIED BACK PAIN LATERALITY: ICD-10-CM

## 2021-01-04 DIAGNOSIS — J45.909 ASTHMA, UNSPECIFIED ASTHMA SEVERITY, UNSPECIFIED WHETHER COMPLICATED, UNSPECIFIED WHETHER PERSISTENT: ICD-10-CM

## 2021-01-04 DIAGNOSIS — N28.89 LEFT RENAL MASS: ICD-10-CM

## 2021-01-04 DIAGNOSIS — I10 ESSENTIAL HYPERTENSION: ICD-10-CM

## 2021-01-04 DIAGNOSIS — E66.01 MORBID OBESITY WITH BMI OF 50.0-59.9, ADULT: ICD-10-CM

## 2021-01-04 DIAGNOSIS — M25.642 DECREASED RANGE OF MOTION OF LEFT THUMB: ICD-10-CM

## 2021-01-04 DIAGNOSIS — D63.1 ANEMIA IN STAGE 4 CHRONIC KIDNEY DISEASE: ICD-10-CM

## 2021-01-04 DIAGNOSIS — K59.00 CONSTIPATION, UNSPECIFIED CONSTIPATION TYPE: ICD-10-CM

## 2021-01-04 DIAGNOSIS — B37.2 CANDIDIASIS, CUTANEOUS: ICD-10-CM

## 2021-01-04 DIAGNOSIS — I51.89 DIASTOLIC DYSFUNCTION: Chronic | ICD-10-CM

## 2021-01-04 DIAGNOSIS — G95.9 CERVICAL MYELOPATHY: ICD-10-CM

## 2021-01-04 PROBLEM — E11.69 TYPE 2 DIABETES MELLITUS WITH OBESITY: Status: RESOLVED | Noted: 2020-08-19 | Resolved: 2021-01-04

## 2021-01-04 PROBLEM — E66.9 TYPE 2 DIABETES MELLITUS WITH OBESITY: Status: RESOLVED | Noted: 2020-08-19 | Resolved: 2021-01-04

## 2021-01-04 LAB
ALBUMIN SERPL BCP-MCNC: 3.8 G/DL (ref 3.5–5.2)
ALP SERPL-CCNC: 158 U/L (ref 55–135)
ALT SERPL W/O P-5'-P-CCNC: 24 U/L (ref 10–44)
ANION GAP SERPL CALC-SCNC: 10 MMOL/L (ref 8–16)
AST SERPL-CCNC: 33 U/L (ref 10–40)
BILIRUB SERPL-MCNC: 0.3 MG/DL (ref 0.1–1)
BUN SERPL-MCNC: 29 MG/DL (ref 8–23)
CALCIUM SERPL-MCNC: 10.3 MG/DL (ref 8.7–10.5)
CHLORIDE SERPL-SCNC: 104 MMOL/L (ref 95–110)
CO2 SERPL-SCNC: 28 MMOL/L (ref 23–29)
CREAT SERPL-MCNC: 1.8 MG/DL (ref 0.5–1.4)
EST. GFR  (AFRICAN AMERICAN): 32.2 ML/MIN/1.73 M^2
EST. GFR  (NON AFRICAN AMERICAN): 27.9 ML/MIN/1.73 M^2
ESTIMATED AVG GLUCOSE: 151 MG/DL (ref 68–131)
GLUCOSE SERPL-MCNC: 76 MG/DL (ref 70–110)
HBA1C MFR BLD HPLC: 6.9 % (ref 4–5.6)
POTASSIUM SERPL-SCNC: 4.1 MMOL/L (ref 3.5–5.1)
PROT SERPL-MCNC: 6.8 G/DL (ref 6–8.4)
SODIUM SERPL-SCNC: 142 MMOL/L (ref 136–145)

## 2021-01-04 PROCEDURE — 99999 PR PBB SHADOW E&M-EST. PATIENT-LVL V: CPT | Mod: PBBFAC,,,

## 2021-01-04 PROCEDURE — 99214 PR OFFICE/OUTPT VISIT, EST, LEVL IV, 30-39 MIN: ICD-10-PCS | Mod: S$PBB,,, | Performed by: HOSPITALIST

## 2021-01-04 PROCEDURE — 99214 OFFICE O/P EST MOD 30 MIN: CPT | Mod: S$PBB,,, | Performed by: HOSPITALIST

## 2021-01-04 PROCEDURE — 80053 COMPREHEN METABOLIC PANEL: CPT

## 2021-01-04 PROCEDURE — 83036 HEMOGLOBIN GLYCOSYLATED A1C: CPT

## 2021-01-04 PROCEDURE — 99215 OFFICE O/P EST HI 40 MIN: CPT | Mod: PBBFAC

## 2021-01-04 PROCEDURE — 36415 COLL VENOUS BLD VENIPUNCTURE: CPT

## 2021-01-04 PROCEDURE — 99999 PR PBB SHADOW E&M-EST. PATIENT-LVL V: ICD-10-PCS | Mod: PBBFAC,,,

## 2021-01-05 ENCOUNTER — LAB VISIT (OUTPATIENT)
Dept: INTERNAL MEDICINE | Facility: CLINIC | Age: 71
End: 2021-01-05
Payer: MEDICARE

## 2021-01-05 DIAGNOSIS — Z01.818 PREOP TESTING: ICD-10-CM

## 2021-01-05 PROBLEM — N18.32 STAGE 3B CHRONIC KIDNEY DISEASE: Status: ACTIVE | Noted: 2019-02-13

## 2021-01-05 PROCEDURE — U0003 INFECTIOUS AGENT DETECTION BY NUCLEIC ACID (DNA OR RNA); SEVERE ACUTE RESPIRATORY SYNDROME CORONAVIRUS 2 (SARS-COV-2) (CORONAVIRUS DISEASE [COVID-19]), AMPLIFIED PROBE TECHNIQUE, MAKING USE OF HIGH THROUGHPUT TECHNOLOGIES AS DESCRIBED BY CMS-2020-01-R: HCPCS

## 2021-01-06 LAB — SARS-COV-2 RNA RESP QL NAA+PROBE: NOT DETECTED

## 2021-01-07 ENCOUNTER — TELEPHONE (OUTPATIENT)
Dept: ORTHOPEDICS | Facility: CLINIC | Age: 71
End: 2021-01-07

## 2021-01-07 RX ORDER — HYDROCODONE BITARTRATE AND ACETAMINOPHEN 7.5; 325 MG/1; MG/1
1 TABLET ORAL EVERY 6 HOURS PRN
Qty: 28 TABLET | Refills: 0 | Status: SHIPPED | OUTPATIENT
Start: 2021-01-07 | End: 2021-08-13 | Stop reason: SDUPTHER

## 2021-01-08 ENCOUNTER — HOSPITAL ENCOUNTER (OUTPATIENT)
Facility: HOSPITAL | Age: 71
Discharge: HOME | End: 2021-01-08
Attending: ORTHOPAEDIC SURGERY | Admitting: ORTHOPAEDIC SURGERY
Payer: MEDICARE

## 2021-01-08 ENCOUNTER — ANESTHESIA (OUTPATIENT)
Dept: SURGERY | Facility: HOSPITAL | Age: 71
End: 2021-01-08
Payer: MEDICARE

## 2021-01-08 VITALS
HEART RATE: 68 BPM | RESPIRATION RATE: 17 BRPM | DIASTOLIC BLOOD PRESSURE: 69 MMHG | WEIGHT: 293 LBS | OXYGEN SATURATION: 95 % | TEMPERATURE: 97 F | SYSTOLIC BLOOD PRESSURE: 127 MMHG | BODY MASS INDEX: 48.82 KG/M2 | HEIGHT: 65 IN

## 2021-01-08 DIAGNOSIS — M65.4 DE QUERVAIN'S TENOSYNOVITIS, LEFT: ICD-10-CM

## 2021-01-08 DIAGNOSIS — M18.12 ARTHRITIS OF CARPOMETACARPAL (CMC) JOINT OF LEFT THUMB: Primary | ICD-10-CM

## 2021-01-08 LAB — POCT GLUCOSE: 82 MG/DL (ref 70–110)

## 2021-01-08 PROCEDURE — 37000008 HC ANESTHESIA 1ST 15 MINUTES: Performed by: ORTHOPAEDIC SURGERY

## 2021-01-08 PROCEDURE — 63600175 PHARM REV CODE 636 W HCPCS: Performed by: STUDENT IN AN ORGANIZED HEALTH CARE EDUCATION/TRAINING PROGRAM

## 2021-01-08 PROCEDURE — 27200750 HC INSULATED NEEDLE/ STIMUPLEX: Performed by: STUDENT IN AN ORGANIZED HEALTH CARE EDUCATION/TRAINING PROGRAM

## 2021-01-08 PROCEDURE — 64415 PR NERVE BLOCK INJ, ANES/STEROID, BRACHIAL PLEXUS, INCL IMAG GUIDANCE: ICD-10-PCS | Mod: 59,LT,, | Performed by: ANESTHESIOLOGY

## 2021-01-08 PROCEDURE — 63600175 PHARM REV CODE 636 W HCPCS: Performed by: ANESTHESIOLOGY

## 2021-01-08 PROCEDURE — 25000003 PHARM REV CODE 250: Performed by: NURSE ANESTHETIST, CERTIFIED REGISTERED

## 2021-01-08 PROCEDURE — C1713 ANCHOR/SCREW BN/BN,TIS/BN: HCPCS | Performed by: ORTHOPAEDIC SURGERY

## 2021-01-08 PROCEDURE — 25000003 PHARM REV CODE 250: Performed by: STUDENT IN AN ORGANIZED HEALTH CARE EDUCATION/TRAINING PROGRAM

## 2021-01-08 PROCEDURE — 36000708 HC OR TIME LEV III 1ST 15 MIN: Performed by: ORTHOPAEDIC SURGERY

## 2021-01-08 PROCEDURE — 25000003 PHARM REV CODE 250: Performed by: ORTHOPAEDIC SURGERY

## 2021-01-08 PROCEDURE — 71000015 HC POSTOP RECOV 1ST HR: Performed by: ORTHOPAEDIC SURGERY

## 2021-01-08 PROCEDURE — D9220A PRA ANESTHESIA: Mod: ANES,,, | Performed by: ANESTHESIOLOGY

## 2021-01-08 PROCEDURE — 76942 ECHO GUIDE FOR BIOPSY: CPT | Mod: 26,,, | Performed by: ANESTHESIOLOGY

## 2021-01-08 PROCEDURE — 25000 PR INCIS TENDON SHEATH,RADIAL STYLOID: ICD-10-PCS | Mod: 59,51,LT,GC | Performed by: ORTHOPAEDIC SURGERY

## 2021-01-08 PROCEDURE — 82962 GLUCOSE BLOOD TEST: CPT | Performed by: ORTHOPAEDIC SURGERY

## 2021-01-08 PROCEDURE — 64415 NJX AA&/STRD BRCH PLXS IMG: CPT | Mod: 59 | Performed by: ANESTHESIOLOGY

## 2021-01-08 PROCEDURE — 36000709 HC OR TIME LEV III EA ADD 15 MIN: Performed by: ORTHOPAEDIC SURGERY

## 2021-01-08 PROCEDURE — 25447 PR REPAIR INTERCARP/CARP-METACARP JT: ICD-10-PCS | Mod: LT,GC,ICN, | Performed by: ORTHOPAEDIC SURGERY

## 2021-01-08 PROCEDURE — 63600175 PHARM REV CODE 636 W HCPCS: Performed by: ORTHOPAEDIC SURGERY

## 2021-01-08 PROCEDURE — D9220A PRA ANESTHESIA: ICD-10-PCS | Mod: ANES,,, | Performed by: ANESTHESIOLOGY

## 2021-01-08 PROCEDURE — 25310 PR TRANSPLANT FOREARM/WRIST TENDON: ICD-10-PCS | Mod: 51,LT,GC,ICN | Performed by: ORTHOPAEDIC SURGERY

## 2021-01-08 PROCEDURE — 99900035 HC TECH TIME PER 15 MIN (STAT)

## 2021-01-08 PROCEDURE — 37000009 HC ANESTHESIA EA ADD 15 MINS: Performed by: ORTHOPAEDIC SURGERY

## 2021-01-08 PROCEDURE — D9220A PRA ANESTHESIA: ICD-10-PCS | Mod: CRNA,,, | Performed by: NURSE ANESTHETIST, CERTIFIED REGISTERED

## 2021-01-08 PROCEDURE — 94761 N-INVAS EAR/PLS OXIMETRY MLT: CPT

## 2021-01-08 PROCEDURE — 64415 NJX AA&/STRD BRCH PLXS IMG: CPT | Mod: 59,LT,, | Performed by: ANESTHESIOLOGY

## 2021-01-08 PROCEDURE — 25000 INCISION OF TENDON SHEATH: CPT | Mod: 59,51,LT,GC | Performed by: ORTHOPAEDIC SURGERY

## 2021-01-08 PROCEDURE — 25447 ARTHRP NTRCRP/CRP/MTCR NTRPS: CPT | Mod: LT,GC,ICN, | Performed by: ORTHOPAEDIC SURGERY

## 2021-01-08 PROCEDURE — 63600175 PHARM REV CODE 636 W HCPCS: Performed by: NURSE ANESTHETIST, CERTIFIED REGISTERED

## 2021-01-08 PROCEDURE — 76942 PR U/S GUIDANCE FOR NEEDLE GUIDANCE: ICD-10-PCS | Mod: 26,,, | Performed by: ANESTHESIOLOGY

## 2021-01-08 PROCEDURE — 71000033 HC RECOVERY, INTIAL HOUR: Performed by: ORTHOPAEDIC SURGERY

## 2021-01-08 PROCEDURE — D9220A PRA ANESTHESIA: Mod: CRNA,,, | Performed by: NURSE ANESTHETIST, CERTIFIED REGISTERED

## 2021-01-08 PROCEDURE — 25000003 PHARM REV CODE 250: Performed by: ANESTHESIOLOGY

## 2021-01-08 PROCEDURE — 25310 TRANSPLANT FOREARM TENDON: CPT | Mod: 51,LT,GC,ICN | Performed by: ORTHOPAEDIC SURGERY

## 2021-01-08 PROCEDURE — 27201423 OPTIME MED/SURG SUP & DEVICES STERILE SUPPLY: Performed by: ORTHOPAEDIC SURGERY

## 2021-01-08 PROCEDURE — 76942 ECHO GUIDE FOR BIOPSY: CPT | Performed by: ANESTHESIOLOGY

## 2021-01-08 DEVICE — IMPLANTABLE DEVICE: Type: IMPLANTABLE DEVICE | Site: HAND | Status: FUNCTIONAL

## 2021-01-08 RX ORDER — FENTANYL CITRATE 50 UG/ML
25 INJECTION, SOLUTION INTRAMUSCULAR; INTRAVENOUS EVERY 5 MIN PRN
Status: DISCONTINUED | OUTPATIENT
Start: 2021-01-08 | End: 2021-01-13 | Stop reason: HOSPADM

## 2021-01-08 RX ORDER — PROPOFOL 10 MG/ML
VIAL (ML) INTRAVENOUS CONTINUOUS PRN
Status: DISCONTINUED | OUTPATIENT
Start: 2021-01-08 | End: 2021-01-08

## 2021-01-08 RX ORDER — CEFAZOLIN SODIUM 1 G/3ML
2 INJECTION, POWDER, FOR SOLUTION INTRAMUSCULAR; INTRAVENOUS
Status: DISCONTINUED | OUTPATIENT
Start: 2021-01-08 | End: 2021-01-13 | Stop reason: HOSPADM

## 2021-01-08 RX ORDER — DEXMEDETOMIDINE HYDROCHLORIDE 100 UG/ML
INJECTION, SOLUTION INTRAVENOUS
Status: DISCONTINUED | OUTPATIENT
Start: 2021-01-08 | End: 2021-01-08

## 2021-01-08 RX ORDER — BUPIVACAINE HYDROCHLORIDE AND EPINEPHRINE 5; 5 MG/ML; UG/ML
INJECTION, SOLUTION EPIDURAL; INTRACAUDAL; PERINEURAL
Status: COMPLETED | OUTPATIENT
Start: 2021-01-08 | End: 2021-01-08

## 2021-01-08 RX ORDER — HYDROCODONE BITARTRATE AND ACETAMINOPHEN 5; 325 MG/1; MG/1
1 TABLET ORAL EVERY 4 HOURS PRN
Status: DISCONTINUED | OUTPATIENT
Start: 2021-01-08 | End: 2021-01-13 | Stop reason: HOSPADM

## 2021-01-08 RX ORDER — OXYCODONE HYDROCHLORIDE 5 MG/1
5 TABLET ORAL
Status: DISCONTINUED | OUTPATIENT
Start: 2021-01-08 | End: 2021-01-13 | Stop reason: HOSPADM

## 2021-01-08 RX ORDER — ONDANSETRON 4 MG/1
8 TABLET, ORALLY DISINTEGRATING ORAL EVERY 8 HOURS PRN
Status: DISCONTINUED | OUTPATIENT
Start: 2021-01-08 | End: 2021-01-13 | Stop reason: HOSPADM

## 2021-01-08 RX ORDER — METOCLOPRAMIDE HYDROCHLORIDE 5 MG/ML
10 INJECTION INTRAMUSCULAR; INTRAVENOUS EVERY 10 MIN PRN
Status: DISCONTINUED | OUTPATIENT
Start: 2021-01-08 | End: 2021-01-13 | Stop reason: HOSPADM

## 2021-01-08 RX ORDER — SODIUM CHLORIDE 0.9 % (FLUSH) 0.9 %
10 SYRINGE (ML) INJECTION
Status: DISCONTINUED | OUTPATIENT
Start: 2021-01-08 | End: 2021-01-13 | Stop reason: HOSPADM

## 2021-01-08 RX ORDER — LIDOCAINE HYDROCHLORIDE 20 MG/ML
INJECTION INTRAVENOUS
Status: DISCONTINUED | OUTPATIENT
Start: 2021-01-08 | End: 2021-01-08

## 2021-01-08 RX ORDER — ACETAMINOPHEN 500 MG
1000 TABLET ORAL
Status: COMPLETED | OUTPATIENT
Start: 2021-01-08 | End: 2021-01-08

## 2021-01-08 RX ORDER — MIDAZOLAM HYDROCHLORIDE 1 MG/ML
0.5 INJECTION INTRAMUSCULAR; INTRAVENOUS
Status: DISCONTINUED | OUTPATIENT
Start: 2021-01-08 | End: 2023-10-24

## 2021-01-08 RX ORDER — MUPIROCIN 20 MG/G
OINTMENT TOPICAL
Status: DISCONTINUED | OUTPATIENT
Start: 2021-01-08 | End: 2021-01-13 | Stop reason: HOSPADM

## 2021-01-08 RX ORDER — FENTANYL CITRATE 50 UG/ML
25 INJECTION, SOLUTION INTRAMUSCULAR; INTRAVENOUS EVERY 5 MIN PRN
Status: DISCONTINUED | OUTPATIENT
Start: 2021-01-08 | End: 2023-10-24

## 2021-01-08 RX ADMIN — MIDAZOLAM HYDROCHLORIDE 2 MG: 1 INJECTION, SOLUTION INTRAMUSCULAR; INTRAVENOUS at 09:01

## 2021-01-08 RX ADMIN — DEXMEDETOMIDINE HYDROCHLORIDE 4 MCG: 100 INJECTION, SOLUTION, CONCENTRATE INTRAVENOUS at 10:01

## 2021-01-08 RX ADMIN — PROPOFOL 30 MCG/KG/MIN: 10 INJECTION, EMULSION INTRAVENOUS at 10:01

## 2021-01-08 RX ADMIN — CEFAZOLIN 2 G: 330 INJECTION, POWDER, FOR SOLUTION INTRAMUSCULAR; INTRAVENOUS at 10:01

## 2021-01-08 RX ADMIN — MEPIVACAINE HYDROCHLORIDE 15 ML: 15 INJECTION, SOLUTION EPIDURAL; INFILTRATION at 10:01

## 2021-01-08 RX ADMIN — LIDOCAINE HYDROCHLORIDE 50 MG: 20 INJECTION, SOLUTION INTRAVENOUS at 10:01

## 2021-01-08 RX ADMIN — MUPIROCIN: 20 OINTMENT TOPICAL at 09:01

## 2021-01-08 RX ADMIN — FENTANYL CITRATE 50 MCG: 50 INJECTION INTRAMUSCULAR; INTRAVENOUS at 09:01

## 2021-01-08 RX ADMIN — ACETAMINOPHEN 1000 MG: 500 TABLET ORAL at 09:01

## 2021-01-08 RX ADMIN — SODIUM CHLORIDE: 0.9 INJECTION, SOLUTION INTRAVENOUS at 10:01

## 2021-01-08 RX ADMIN — BUPIVACAINE HYDROCHLORIDE AND EPINEPHRINE BITARTRATE 15 ML: 5; .0091 INJECTION, SOLUTION EPIDURAL; INTRACAUDAL; PERINEURAL at 10:01

## 2021-01-11 LAB — POCT GLUCOSE: 126 MG/DL (ref 70–110)

## 2021-01-12 ENCOUNTER — TELEPHONE (OUTPATIENT)
Dept: ORTHOPEDICS | Facility: CLINIC | Age: 71
End: 2021-01-12

## 2021-01-12 ENCOUNTER — OFFICE VISIT (OUTPATIENT)
Dept: ORTHOPEDICS | Facility: CLINIC | Age: 71
End: 2021-01-12
Attending: ORTHOPAEDIC SURGERY
Payer: MEDICARE

## 2021-01-12 DIAGNOSIS — M18.12 ARTHRITIS OF CARPOMETACARPAL (CMC) JOINT OF LEFT THUMB: Primary | ICD-10-CM

## 2021-01-12 PROCEDURE — 99999 PR PBB SHADOW E&M-EST. PATIENT-LVL III: CPT | Mod: PBBFAC,,, | Performed by: ORTHOPAEDIC SURGERY

## 2021-01-12 PROCEDURE — 99213 OFFICE O/P EST LOW 20 MIN: CPT | Mod: PBBFAC | Performed by: ORTHOPAEDIC SURGERY

## 2021-01-12 PROCEDURE — 99024 PR POST-OP FOLLOW-UP VISIT: ICD-10-PCS | Mod: POP,,, | Performed by: ORTHOPAEDIC SURGERY

## 2021-01-12 PROCEDURE — 99999 PR PBB SHADOW E&M-EST. PATIENT-LVL III: ICD-10-PCS | Mod: PBBFAC,,, | Performed by: ORTHOPAEDIC SURGERY

## 2021-01-12 PROCEDURE — 99024 POSTOP FOLLOW-UP VISIT: CPT | Mod: POP,,, | Performed by: ORTHOPAEDIC SURGERY

## 2021-01-14 ENCOUNTER — TELEPHONE (OUTPATIENT)
Dept: SLEEP MEDICINE | Facility: OTHER | Age: 71
End: 2021-01-14

## 2021-01-15 ENCOUNTER — OFFICE VISIT (OUTPATIENT)
Dept: ENDOCRINOLOGY | Facility: CLINIC | Age: 71
End: 2021-01-15
Payer: MEDICARE

## 2021-01-15 VITALS
HEART RATE: 84 BPM | HEIGHT: 65 IN | WEIGHT: 293 LBS | SYSTOLIC BLOOD PRESSURE: 142 MMHG | BODY MASS INDEX: 48.82 KG/M2 | DIASTOLIC BLOOD PRESSURE: 80 MMHG

## 2021-01-15 DIAGNOSIS — E66.01 MORBID OBESITY WITH BMI OF 50.0-59.9, ADULT: ICD-10-CM

## 2021-01-15 DIAGNOSIS — E11.22 TYPE 2 DM WITH CKD STAGE 4 AND HYPERTENSION: ICD-10-CM

## 2021-01-15 DIAGNOSIS — Z79.4 TYPE 2 DIABETES MELLITUS WITH DIABETIC POLYNEUROPATHY, WITH LONG-TERM CURRENT USE OF INSULIN: Primary | ICD-10-CM

## 2021-01-15 DIAGNOSIS — N18.4 ANEMIA IN STAGE 4 CHRONIC KIDNEY DISEASE: ICD-10-CM

## 2021-01-15 DIAGNOSIS — D63.1 ANEMIA IN STAGE 4 CHRONIC KIDNEY DISEASE: ICD-10-CM

## 2021-01-15 DIAGNOSIS — E78.5 DYSLIPIDEMIA: Chronic | ICD-10-CM

## 2021-01-15 DIAGNOSIS — E11.649 HYPOGLYCEMIA UNAWARENESS ASSOCIATED WITH TYPE 2 DIABETES MELLITUS: ICD-10-CM

## 2021-01-15 DIAGNOSIS — E11.42 TYPE 2 DIABETES MELLITUS WITH DIABETIC POLYNEUROPATHY, WITH LONG-TERM CURRENT USE OF INSULIN: Primary | ICD-10-CM

## 2021-01-15 DIAGNOSIS — N18.4 TYPE 2 DM WITH CKD STAGE 4 AND HYPERTENSION: ICD-10-CM

## 2021-01-15 DIAGNOSIS — I12.9 TYPE 2 DM WITH CKD STAGE 4 AND HYPERTENSION: ICD-10-CM

## 2021-01-15 PROCEDURE — 99215 OFFICE O/P EST HI 40 MIN: CPT | Mod: PBBFAC | Performed by: INTERNAL MEDICINE

## 2021-01-15 PROCEDURE — 99999 PR PBB SHADOW E&M-EST. PATIENT-LVL V: ICD-10-PCS | Mod: PBBFAC,,, | Performed by: INTERNAL MEDICINE

## 2021-01-15 PROCEDURE — 99214 PR OFFICE/OUTPT VISIT, EST, LEVL IV, 30-39 MIN: ICD-10-PCS | Mod: S$PBB,,, | Performed by: INTERNAL MEDICINE

## 2021-01-15 PROCEDURE — 99999 PR PBB SHADOW E&M-EST. PATIENT-LVL V: CPT | Mod: PBBFAC,,, | Performed by: INTERNAL MEDICINE

## 2021-01-15 PROCEDURE — 99214 OFFICE O/P EST MOD 30 MIN: CPT | Mod: S$PBB,,, | Performed by: INTERNAL MEDICINE

## 2021-01-26 ENCOUNTER — OFFICE VISIT (OUTPATIENT)
Dept: ORTHOPEDICS | Facility: CLINIC | Age: 71
End: 2021-01-26
Payer: MEDICARE

## 2021-01-26 ENCOUNTER — HOSPITAL ENCOUNTER (OUTPATIENT)
Dept: RADIOLOGY | Facility: HOSPITAL | Age: 71
Discharge: HOME | End: 2021-01-26
Attending: ORTHOPAEDIC SURGERY
Payer: MEDICARE

## 2021-01-26 DIAGNOSIS — M18.12 ARTHRITIS OF CARPOMETACARPAL (CMC) JOINT OF LEFT THUMB: ICD-10-CM

## 2021-01-26 DIAGNOSIS — M18.12 ARTHRITIS OF CARPOMETACARPAL (CMC) JOINT OF LEFT THUMB: Primary | ICD-10-CM

## 2021-01-26 PROCEDURE — 73130 X-RAY EXAM OF HAND: CPT | Mod: 26,LT,, | Performed by: RADIOLOGY

## 2021-01-26 PROCEDURE — 73130 XR HAND COMPLETE 3 VIEW LEFT: ICD-10-PCS | Mod: 26,LT,, | Performed by: RADIOLOGY

## 2021-01-26 PROCEDURE — 99214 OFFICE O/P EST MOD 30 MIN: CPT | Mod: PBBFAC,25 | Performed by: ORTHOPAEDIC SURGERY

## 2021-01-26 PROCEDURE — 73130 X-RAY EXAM OF HAND: CPT | Mod: TC,LT

## 2021-01-26 PROCEDURE — 99024 PR POST-OP FOLLOW-UP VISIT: ICD-10-PCS | Mod: POP,,, | Performed by: ORTHOPAEDIC SURGERY

## 2021-01-26 PROCEDURE — 99024 POSTOP FOLLOW-UP VISIT: CPT | Mod: POP,,, | Performed by: ORTHOPAEDIC SURGERY

## 2021-01-26 PROCEDURE — 99999 PR PBB SHADOW E&M-EST. PATIENT-LVL IV: ICD-10-PCS | Mod: PBBFAC,,, | Performed by: ORTHOPAEDIC SURGERY

## 2021-01-26 PROCEDURE — 99999 PR PBB SHADOW E&M-EST. PATIENT-LVL IV: CPT | Mod: PBBFAC,,, | Performed by: ORTHOPAEDIC SURGERY

## 2021-01-27 ENCOUNTER — PATIENT MESSAGE (OUTPATIENT)
Dept: ENDOCRINOLOGY | Facility: CLINIC | Age: 71
End: 2021-01-27

## 2021-02-03 ENCOUNTER — TELEPHONE (OUTPATIENT)
Dept: ENDOCRINOLOGY | Facility: CLINIC | Age: 71
End: 2021-02-03

## 2021-02-08 ENCOUNTER — CLINICAL SUPPORT (OUTPATIENT)
Dept: REHABILITATION | Facility: HOSPITAL | Age: 71
End: 2021-02-08
Attending: STUDENT IN AN ORGANIZED HEALTH CARE EDUCATION/TRAINING PROGRAM
Payer: MEDICARE

## 2021-02-08 DIAGNOSIS — M18.12 ARTHRITIS OF CARPOMETACARPAL (CMC) JOINT OF LEFT THUMB: ICD-10-CM

## 2021-02-08 DIAGNOSIS — R20.8 DECREASED SENSATION: ICD-10-CM

## 2021-02-08 DIAGNOSIS — M79.645 PAIN OF LEFT THUMB: ICD-10-CM

## 2021-02-08 DIAGNOSIS — M25.642 DECREASED RANGE OF MOTION OF LEFT THUMB: Primary | ICD-10-CM

## 2021-02-08 DIAGNOSIS — M62.81 MUSCLE WEAKNESS OF LEFT ARM: ICD-10-CM

## 2021-02-08 PROCEDURE — 97140 MANUAL THERAPY 1/> REGIONS: CPT | Mod: PO

## 2021-02-08 PROCEDURE — 97110 THERAPEUTIC EXERCISES: CPT | Mod: PO

## 2021-02-08 PROCEDURE — 97165 OT EVAL LOW COMPLEX 30 MIN: CPT | Mod: PO

## 2021-02-10 ENCOUNTER — CLINICAL SUPPORT (OUTPATIENT)
Dept: REHABILITATION | Facility: HOSPITAL | Age: 71
End: 2021-02-10
Attending: STUDENT IN AN ORGANIZED HEALTH CARE EDUCATION/TRAINING PROGRAM
Payer: MEDICARE

## 2021-02-10 DIAGNOSIS — M79.645 PAIN OF LEFT THUMB: ICD-10-CM

## 2021-02-10 DIAGNOSIS — R20.8 DECREASED SENSATION: ICD-10-CM

## 2021-02-10 PROCEDURE — 97110 THERAPEUTIC EXERCISES: CPT | Mod: PO

## 2021-02-10 PROCEDURE — 97140 MANUAL THERAPY 1/> REGIONS: CPT | Mod: PO

## 2021-02-10 PROCEDURE — 97530 THERAPEUTIC ACTIVITIES: CPT | Mod: PO

## 2021-02-12 ENCOUNTER — HOSPITAL ENCOUNTER (OUTPATIENT)
Dept: SLEEP MEDICINE | Facility: OTHER | Age: 71
Discharge: HOME | End: 2021-02-12
Attending: INTERNAL MEDICINE
Payer: MEDICARE

## 2021-02-12 DIAGNOSIS — Z79.4 TYPE 2 DIABETES MELLITUS WITH DIABETIC POLYNEUROPATHY, WITH LONG-TERM CURRENT USE OF INSULIN: ICD-10-CM

## 2021-02-12 DIAGNOSIS — Z86.73 HISTORY OF STROKE: ICD-10-CM

## 2021-02-12 DIAGNOSIS — N18.4 STAGE 4 CHRONIC KIDNEY DISEASE: ICD-10-CM

## 2021-02-12 DIAGNOSIS — G47.33 OSA (OBSTRUCTIVE SLEEP APNEA): ICD-10-CM

## 2021-02-12 DIAGNOSIS — I10 ESSENTIAL HYPERTENSION: ICD-10-CM

## 2021-02-12 DIAGNOSIS — I73.9 PAD (PERIPHERAL ARTERY DISEASE): ICD-10-CM

## 2021-02-12 DIAGNOSIS — E11.42 TYPE 2 DIABETES MELLITUS WITH DIABETIC POLYNEUROPATHY, WITH LONG-TERM CURRENT USE OF INSULIN: ICD-10-CM

## 2021-02-12 DIAGNOSIS — E66.01 MORBID OBESITY WITH BMI OF 50.0-59.9, ADULT: ICD-10-CM

## 2021-02-12 PROCEDURE — 95810 PR POLYSOMNOGRAPHY, 4 OR MORE: ICD-10-PCS | Mod: 26,,, | Performed by: INTERNAL MEDICINE

## 2021-02-12 PROCEDURE — 95810 POLYSOM 6/> YRS 4/> PARAM: CPT | Mod: 26,,, | Performed by: INTERNAL MEDICINE

## 2021-02-12 PROCEDURE — 95810 POLYSOM 6/> YRS 4/> PARAM: CPT

## 2021-02-18 DIAGNOSIS — I10 ESSENTIAL HYPERTENSION: ICD-10-CM

## 2021-02-18 DIAGNOSIS — Z86.73 HISTORY OF STROKE: ICD-10-CM

## 2021-02-18 DIAGNOSIS — G47.33 OSA (OBSTRUCTIVE SLEEP APNEA): Primary | ICD-10-CM

## 2021-02-19 ENCOUNTER — CLINICAL SUPPORT (OUTPATIENT)
Dept: REHABILITATION | Facility: HOSPITAL | Age: 71
End: 2021-02-19
Attending: STUDENT IN AN ORGANIZED HEALTH CARE EDUCATION/TRAINING PROGRAM
Payer: MEDICARE

## 2021-02-19 DIAGNOSIS — R20.8 DECREASED SENSATION: ICD-10-CM

## 2021-02-19 DIAGNOSIS — M79.645 PAIN OF LEFT THUMB: ICD-10-CM

## 2021-02-19 PROCEDURE — 97140 MANUAL THERAPY 1/> REGIONS: CPT | Mod: PO

## 2021-02-19 PROCEDURE — 97110 THERAPEUTIC EXERCISES: CPT | Mod: PO

## 2021-02-19 PROCEDURE — 97530 THERAPEUTIC ACTIVITIES: CPT | Mod: PO

## 2021-02-22 ENCOUNTER — CLINICAL SUPPORT (OUTPATIENT)
Dept: REHABILITATION | Facility: HOSPITAL | Age: 71
End: 2021-02-22
Attending: STUDENT IN AN ORGANIZED HEALTH CARE EDUCATION/TRAINING PROGRAM
Payer: MEDICARE

## 2021-02-22 DIAGNOSIS — M79.645 PAIN OF LEFT THUMB: ICD-10-CM

## 2021-02-22 DIAGNOSIS — R20.8 DECREASED SENSATION: ICD-10-CM

## 2021-02-22 PROCEDURE — 97140 MANUAL THERAPY 1/> REGIONS: CPT | Mod: PO

## 2021-02-22 PROCEDURE — 97110 THERAPEUTIC EXERCISES: CPT | Mod: PO

## 2021-02-23 ENCOUNTER — OFFICE VISIT (OUTPATIENT)
Dept: ORTHOPEDICS | Facility: CLINIC | Age: 71
End: 2021-02-23
Payer: MEDICARE

## 2021-02-23 ENCOUNTER — PATIENT OUTREACH (OUTPATIENT)
Dept: ADMINISTRATIVE | Facility: OTHER | Age: 71
End: 2021-02-23

## 2021-02-23 ENCOUNTER — PATIENT MESSAGE (OUTPATIENT)
Dept: RHEUMATOLOGY | Facility: CLINIC | Age: 71
End: 2021-02-23

## 2021-02-23 ENCOUNTER — IMMUNIZATION (OUTPATIENT)
Dept: PHARMACY | Facility: CLINIC | Age: 71
End: 2021-02-23
Payer: COMMERCIAL

## 2021-02-23 DIAGNOSIS — M18.12 ARTHRITIS OF CARPOMETACARPAL (CMC) JOINT OF LEFT THUMB: Primary | ICD-10-CM

## 2021-02-23 PROCEDURE — 99024 PR POST-OP FOLLOW-UP VISIT: ICD-10-PCS | Mod: POP,,, | Performed by: ORTHOPAEDIC SURGERY

## 2021-02-23 PROCEDURE — 99024 POSTOP FOLLOW-UP VISIT: CPT | Mod: POP,,, | Performed by: ORTHOPAEDIC SURGERY

## 2021-02-24 ENCOUNTER — OFFICE VISIT (OUTPATIENT)
Dept: PODIATRY | Facility: CLINIC | Age: 71
End: 2021-02-24
Payer: MEDICARE

## 2021-02-24 ENCOUNTER — OFFICE VISIT (OUTPATIENT)
Dept: NEPHROLOGY | Facility: CLINIC | Age: 71
End: 2021-02-24
Payer: MEDICARE

## 2021-02-24 VITALS
DIASTOLIC BLOOD PRESSURE: 50 MMHG | BODY MASS INDEX: 48.82 KG/M2 | HEART RATE: 73 BPM | WEIGHT: 293 LBS | HEIGHT: 65 IN | SYSTOLIC BLOOD PRESSURE: 122 MMHG | OXYGEN SATURATION: 98 %

## 2021-02-24 VITALS
BODY MASS INDEX: 52.42 KG/M2 | DIASTOLIC BLOOD PRESSURE: 59 MMHG | HEART RATE: 77 BPM | WEIGHT: 293 LBS | SYSTOLIC BLOOD PRESSURE: 109 MMHG

## 2021-02-24 DIAGNOSIS — E87.20 ACIDOSIS, METABOLIC: ICD-10-CM

## 2021-02-24 DIAGNOSIS — M76.821 POSTERIOR TIBIAL TENDINITIS OF RIGHT LEG: ICD-10-CM

## 2021-02-24 DIAGNOSIS — E08.43 DIABETIC AUTONOMIC NEUROPATHY ASSOCIATED WITH DIABETES MELLITUS DUE TO UNDERLYING CONDITION: Primary | ICD-10-CM

## 2021-02-24 DIAGNOSIS — I10 ESSENTIAL HYPERTENSION: Primary | ICD-10-CM

## 2021-02-24 DIAGNOSIS — B35.1 ONYCHOMYCOSIS DUE TO DERMATOPHYTE: ICD-10-CM

## 2021-02-24 DIAGNOSIS — E11.42 DIABETIC POLYNEUROPATHY ASSOCIATED WITH TYPE 2 DIABETES MELLITUS: ICD-10-CM

## 2021-02-24 DIAGNOSIS — L84 CORN OR CALLUS: ICD-10-CM

## 2021-02-24 DIAGNOSIS — N18.32 STAGE 3B CHRONIC KIDNEY DISEASE: ICD-10-CM

## 2021-02-24 DIAGNOSIS — M21.42 PES PLANUS OF BOTH FEET: ICD-10-CM

## 2021-02-24 DIAGNOSIS — N18.4 CHRONIC KIDNEY DISEASE, STAGE 4 (SEVERE): ICD-10-CM

## 2021-02-24 DIAGNOSIS — M21.41 PES PLANUS OF BOTH FEET: ICD-10-CM

## 2021-02-24 PROCEDURE — 11721 DEBRIDE NAIL 6 OR MORE: CPT | Mod: 59,Q9,PBBFAC | Performed by: PODIATRIST

## 2021-02-24 PROCEDURE — 99215 OFFICE O/P EST HI 40 MIN: CPT | Mod: PBBFAC | Performed by: INTERNAL MEDICINE

## 2021-02-24 PROCEDURE — 11721 DEBRIDE NAIL 6 OR MORE: CPT | Mod: 59,Q9,S$PBB, | Performed by: PODIATRIST

## 2021-02-24 PROCEDURE — 99999 PR PBB SHADOW E&M-EST. PATIENT-LVL V: ICD-10-PCS | Mod: PBBFAC,,, | Performed by: INTERNAL MEDICINE

## 2021-02-24 PROCEDURE — 11056 PR TRIM BENIGN HYPERKERATOTIC SKIN LESION,2-4: ICD-10-PCS | Mod: Q9,S$PBB,, | Performed by: PODIATRIST

## 2021-02-24 PROCEDURE — 11721 PR DEBRIDEMENT OF NAILS, 6 OR MORE: ICD-10-PCS | Mod: 59,Q9,S$PBB, | Performed by: PODIATRIST

## 2021-02-24 PROCEDURE — 99214 PR OFFICE/OUTPT VISIT, EST, LEVL IV, 30-39 MIN: ICD-10-PCS | Mod: 25,S$PBB,, | Performed by: PODIATRIST

## 2021-02-24 PROCEDURE — 99214 OFFICE O/P EST MOD 30 MIN: CPT | Mod: PBBFAC,27,25 | Performed by: PODIATRIST

## 2021-02-24 PROCEDURE — 99999 PR PBB SHADOW E&M-EST. PATIENT-LVL IV: ICD-10-PCS | Mod: PBBFAC,,, | Performed by: PODIATRIST

## 2021-02-24 PROCEDURE — 99999 PR PBB SHADOW E&M-EST. PATIENT-LVL IV: CPT | Mod: PBBFAC,,, | Performed by: PODIATRIST

## 2021-02-24 PROCEDURE — 11056 PARNG/CUTG B9 HYPRKR LES 2-4: CPT | Mod: PBBFAC | Performed by: PODIATRIST

## 2021-02-24 PROCEDURE — 99214 OFFICE O/P EST MOD 30 MIN: CPT | Mod: 25,S$PBB,, | Performed by: PODIATRIST

## 2021-02-24 PROCEDURE — 99214 OFFICE O/P EST MOD 30 MIN: CPT | Mod: S$PBB,,, | Performed by: INTERNAL MEDICINE

## 2021-02-24 PROCEDURE — 99214 PR OFFICE/OUTPT VISIT, EST, LEVL IV, 30-39 MIN: ICD-10-PCS | Mod: S$PBB,,, | Performed by: INTERNAL MEDICINE

## 2021-02-24 PROCEDURE — 99999 PR PBB SHADOW E&M-EST. PATIENT-LVL V: CPT | Mod: PBBFAC,,, | Performed by: INTERNAL MEDICINE

## 2021-02-24 PROCEDURE — 11056 PARNG/CUTG B9 HYPRKR LES 2-4: CPT | Mod: Q9,S$PBB,, | Performed by: PODIATRIST

## 2021-02-24 RX ORDER — SODIUM BICARBONATE 325 MG/1
650 TABLET ORAL 2 TIMES DAILY
Qty: 120 TABLET | Refills: 11 | COMMUNITY
Start: 2021-02-24 | End: 2023-04-26

## 2021-02-24 RX ORDER — IRBESARTAN 300 MG/1
300 TABLET ORAL NIGHTLY
Qty: 90 TABLET | Refills: 9 | Status: SHIPPED | OUTPATIENT
Start: 2021-02-24 | End: 2021-05-12 | Stop reason: SDUPTHER

## 2021-02-24 RX ORDER — LABETALOL 300 MG/1
300 TABLET, FILM COATED ORAL 2 TIMES DAILY
Qty: 180 TABLET | Refills: 9 | Status: ON HOLD | OUTPATIENT
Start: 2021-02-24 | End: 2021-05-06

## 2021-02-24 RX ORDER — AMLODIPINE BESYLATE 10 MG/1
10 TABLET ORAL DAILY
Qty: 90 TABLET | Refills: 9 | Status: SHIPPED | OUTPATIENT
Start: 2021-02-24 | End: 2021-05-12 | Stop reason: SDUPTHER

## 2021-02-26 ENCOUNTER — CLINICAL SUPPORT (OUTPATIENT)
Dept: REHABILITATION | Facility: HOSPITAL | Age: 71
End: 2021-02-26
Attending: STUDENT IN AN ORGANIZED HEALTH CARE EDUCATION/TRAINING PROGRAM
Payer: MEDICARE

## 2021-02-26 DIAGNOSIS — R20.8 DECREASED SENSATION: ICD-10-CM

## 2021-02-26 DIAGNOSIS — M79.645 PAIN OF LEFT THUMB: ICD-10-CM

## 2021-02-26 PROCEDURE — 97018 PARAFFIN BATH THERAPY: CPT | Mod: PO

## 2021-02-26 PROCEDURE — 97110 THERAPEUTIC EXERCISES: CPT | Mod: PO

## 2021-03-01 ENCOUNTER — CLINICAL SUPPORT (OUTPATIENT)
Dept: REHABILITATION | Facility: HOSPITAL | Age: 71
End: 2021-03-01
Attending: STUDENT IN AN ORGANIZED HEALTH CARE EDUCATION/TRAINING PROGRAM
Payer: MEDICARE

## 2021-03-01 ENCOUNTER — TELEPHONE (OUTPATIENT)
Dept: HEMATOLOGY/ONCOLOGY | Facility: CLINIC | Age: 71
End: 2021-03-01

## 2021-03-01 DIAGNOSIS — R20.8 DECREASED SENSATION: ICD-10-CM

## 2021-03-01 DIAGNOSIS — M79.645 PAIN OF LEFT THUMB: ICD-10-CM

## 2021-03-01 PROCEDURE — 97018 PARAFFIN BATH THERAPY: CPT | Mod: PO,59

## 2021-03-01 PROCEDURE — 97140 MANUAL THERAPY 1/> REGIONS: CPT | Mod: PO

## 2021-03-01 PROCEDURE — 97110 THERAPEUTIC EXERCISES: CPT | Mod: PO

## 2021-03-05 ENCOUNTER — CLINICAL SUPPORT (OUTPATIENT)
Dept: REHABILITATION | Facility: HOSPITAL | Age: 71
End: 2021-03-05
Attending: STUDENT IN AN ORGANIZED HEALTH CARE EDUCATION/TRAINING PROGRAM
Payer: MEDICARE

## 2021-03-05 DIAGNOSIS — R20.8 DECREASED SENSATION: ICD-10-CM

## 2021-03-05 DIAGNOSIS — M79.645 PAIN OF LEFT THUMB: ICD-10-CM

## 2021-03-05 DIAGNOSIS — M18.12 ARTHRITIS OF CARPOMETACARPAL (CMC) JOINT OF LEFT THUMB: Primary | ICD-10-CM

## 2021-03-05 PROCEDURE — 97018 PARAFFIN BATH THERAPY: CPT | Mod: PO

## 2021-03-05 PROCEDURE — 97110 THERAPEUTIC EXERCISES: CPT | Mod: PO

## 2021-03-05 PROCEDURE — 97530 THERAPEUTIC ACTIVITIES: CPT | Mod: PO

## 2021-03-08 ENCOUNTER — CLINICAL SUPPORT (OUTPATIENT)
Dept: REHABILITATION | Facility: HOSPITAL | Age: 71
End: 2021-03-08
Attending: STUDENT IN AN ORGANIZED HEALTH CARE EDUCATION/TRAINING PROGRAM
Payer: MEDICARE

## 2021-03-08 DIAGNOSIS — M79.645 PAIN OF LEFT THUMB: ICD-10-CM

## 2021-03-08 DIAGNOSIS — R20.8 DECREASED SENSATION: ICD-10-CM

## 2021-03-08 DIAGNOSIS — M18.12 ARTHRITIS OF CARPOMETACARPAL (CMC) JOINT OF LEFT THUMB: ICD-10-CM

## 2021-03-08 PROCEDURE — 97530 THERAPEUTIC ACTIVITIES: CPT | Mod: PO

## 2021-03-10 ENCOUNTER — CLINICAL SUPPORT (OUTPATIENT)
Dept: REHABILITATION | Facility: HOSPITAL | Age: 71
End: 2021-03-10
Attending: STUDENT IN AN ORGANIZED HEALTH CARE EDUCATION/TRAINING PROGRAM
Payer: MEDICARE

## 2021-03-10 DIAGNOSIS — M79.645 PAIN OF LEFT THUMB: ICD-10-CM

## 2021-03-10 DIAGNOSIS — R20.8 DECREASED SENSATION: ICD-10-CM

## 2021-03-10 PROCEDURE — 97530 THERAPEUTIC ACTIVITIES: CPT | Mod: PO

## 2021-03-10 PROCEDURE — 97018 PARAFFIN BATH THERAPY: CPT | Mod: PO

## 2021-03-10 PROCEDURE — 97110 THERAPEUTIC EXERCISES: CPT | Mod: PO

## 2021-03-15 ENCOUNTER — OFFICE VISIT (OUTPATIENT)
Dept: BARIATRICS | Facility: CLINIC | Age: 71
End: 2021-03-15
Payer: MEDICARE

## 2021-03-15 VITALS
BODY MASS INDEX: 52.02 KG/M2 | WEIGHT: 293 LBS | DIASTOLIC BLOOD PRESSURE: 50 MMHG | OXYGEN SATURATION: 98 % | HEART RATE: 69 BPM | SYSTOLIC BLOOD PRESSURE: 120 MMHG

## 2021-03-15 DIAGNOSIS — E66.01 CLASS 3 SEVERE OBESITY DUE TO EXCESS CALORIES WITH SERIOUS COMORBIDITY AND BODY MASS INDEX (BMI) OF 50.0 TO 59.9 IN ADULT: Primary | ICD-10-CM

## 2021-03-15 PROCEDURE — 3288F FALL RISK ASSESSMENT DOCD: CPT | Mod: CPTII,S$GLB,, | Performed by: INTERNAL MEDICINE

## 2021-03-15 PROCEDURE — 3078F PR MOST RECENT DIASTOLIC BLOOD PRESSURE < 80 MM HG: ICD-10-PCS | Mod: CPTII,S$GLB,, | Performed by: INTERNAL MEDICINE

## 2021-03-15 PROCEDURE — 3008F BODY MASS INDEX DOCD: CPT | Mod: CPTII,S$GLB,, | Performed by: INTERNAL MEDICINE

## 2021-03-15 PROCEDURE — 1101F PT FALLS ASSESS-DOCD LE1/YR: CPT | Mod: CPTII,S$GLB,, | Performed by: INTERNAL MEDICINE

## 2021-03-15 PROCEDURE — 1159F PR MEDICATION LIST DOCUMENTED IN MEDICAL RECORD: ICD-10-PCS | Mod: S$GLB,,, | Performed by: INTERNAL MEDICINE

## 2021-03-15 PROCEDURE — 1126F PR PAIN SEVERITY QUANTIFIED, NO PAIN PRESENT: ICD-10-PCS | Mod: S$GLB,,, | Performed by: INTERNAL MEDICINE

## 2021-03-15 PROCEDURE — 1159F MED LIST DOCD IN RCRD: CPT | Mod: S$GLB,,, | Performed by: INTERNAL MEDICINE

## 2021-03-15 PROCEDURE — 3074F PR MOST RECENT SYSTOLIC BLOOD PRESSURE < 130 MM HG: ICD-10-PCS | Mod: CPTII,S$GLB,, | Performed by: INTERNAL MEDICINE

## 2021-03-15 PROCEDURE — 99214 OFFICE O/P EST MOD 30 MIN: CPT | Mod: S$GLB,,, | Performed by: INTERNAL MEDICINE

## 2021-03-15 PROCEDURE — 3008F PR BODY MASS INDEX (BMI) DOCUMENTED: ICD-10-PCS | Mod: CPTII,S$GLB,, | Performed by: INTERNAL MEDICINE

## 2021-03-15 PROCEDURE — 99214 PR OFFICE/OUTPT VISIT, EST, LEVL IV, 30-39 MIN: ICD-10-PCS | Mod: S$GLB,,, | Performed by: INTERNAL MEDICINE

## 2021-03-15 PROCEDURE — 99999 PR PBB SHADOW E&M-EST. PATIENT-LVL V: CPT | Mod: PBBFAC,,, | Performed by: INTERNAL MEDICINE

## 2021-03-15 PROCEDURE — 1126F AMNT PAIN NOTED NONE PRSNT: CPT | Mod: S$GLB,,, | Performed by: INTERNAL MEDICINE

## 2021-03-15 PROCEDURE — 99999 PR PBB SHADOW E&M-EST. PATIENT-LVL V: ICD-10-PCS | Mod: PBBFAC,,, | Performed by: INTERNAL MEDICINE

## 2021-03-15 PROCEDURE — 1101F PR PT FALLS ASSESS DOC 0-1 FALLS W/OUT INJ PAST YR: ICD-10-PCS | Mod: CPTII,S$GLB,, | Performed by: INTERNAL MEDICINE

## 2021-03-15 PROCEDURE — 3288F PR FALLS RISK ASSESSMENT DOCUMENTED: ICD-10-PCS | Mod: CPTII,S$GLB,, | Performed by: INTERNAL MEDICINE

## 2021-03-15 PROCEDURE — 3074F SYST BP LT 130 MM HG: CPT | Mod: CPTII,S$GLB,, | Performed by: INTERNAL MEDICINE

## 2021-03-15 PROCEDURE — 3078F DIAST BP <80 MM HG: CPT | Mod: CPTII,S$GLB,, | Performed by: INTERNAL MEDICINE

## 2021-03-15 RX ORDER — TOPIRAMATE 50 MG/1
50 TABLET, FILM COATED ORAL 2 TIMES DAILY
Qty: 180 TABLET | Refills: 0 | Status: SHIPPED | OUTPATIENT
Start: 2021-03-15 | End: 2021-06-02 | Stop reason: SDUPTHER

## 2021-03-17 ENCOUNTER — CLINICAL SUPPORT (OUTPATIENT)
Dept: REHABILITATION | Facility: HOSPITAL | Age: 71
End: 2021-03-17
Attending: STUDENT IN AN ORGANIZED HEALTH CARE EDUCATION/TRAINING PROGRAM
Payer: MEDICARE

## 2021-03-17 DIAGNOSIS — M79.645 PAIN OF LEFT THUMB: ICD-10-CM

## 2021-03-17 DIAGNOSIS — R20.8 DECREASED SENSATION: ICD-10-CM

## 2021-03-17 PROCEDURE — 97140 MANUAL THERAPY 1/> REGIONS: CPT | Mod: PO

## 2021-03-17 PROCEDURE — 97018 PARAFFIN BATH THERAPY: CPT | Mod: PO,59

## 2021-03-17 PROCEDURE — 97110 THERAPEUTIC EXERCISES: CPT | Mod: PO

## 2021-03-22 ENCOUNTER — OFFICE VISIT (OUTPATIENT)
Dept: OPHTHALMOLOGY | Facility: CLINIC | Age: 71
End: 2021-03-22
Payer: MEDICARE

## 2021-03-22 ENCOUNTER — OFFICE VISIT (OUTPATIENT)
Dept: HEMATOLOGY/ONCOLOGY | Facility: CLINIC | Age: 71
End: 2021-03-22
Payer: MEDICARE

## 2021-03-22 ENCOUNTER — LAB VISIT (OUTPATIENT)
Dept: LAB | Facility: HOSPITAL | Age: 71
End: 2021-03-22
Attending: INTERNAL MEDICINE
Payer: MEDICARE

## 2021-03-22 VITALS
TEMPERATURE: 98 F | HEART RATE: 72 BPM | SYSTOLIC BLOOD PRESSURE: 114 MMHG | OXYGEN SATURATION: 97 % | RESPIRATION RATE: 17 BRPM | HEIGHT: 65 IN | DIASTOLIC BLOOD PRESSURE: 84 MMHG | BODY MASS INDEX: 48.82 KG/M2 | WEIGHT: 293 LBS

## 2021-03-22 DIAGNOSIS — H35.373 EPIRETINAL MEMBRANE (ERM) OF BOTH EYES: ICD-10-CM

## 2021-03-22 DIAGNOSIS — D64.9 ANEMIA, UNSPECIFIED TYPE: ICD-10-CM

## 2021-03-22 DIAGNOSIS — D63.1 ANEMIA IN STAGE 3B CHRONIC KIDNEY DISEASE: Primary | ICD-10-CM

## 2021-03-22 DIAGNOSIS — N18.32 ANEMIA IN STAGE 3B CHRONIC KIDNEY DISEASE: Primary | ICD-10-CM

## 2021-03-22 LAB
ALBUMIN SERPL BCP-MCNC: 3.5 G/DL (ref 3.5–5.2)
ALP SERPL-CCNC: 122 U/L (ref 55–135)
ALT SERPL W/O P-5'-P-CCNC: 18 U/L (ref 10–44)
ANION GAP SERPL CALC-SCNC: 8 MMOL/L (ref 8–16)
AST SERPL-CCNC: 24 U/L (ref 10–40)
BILIRUB SERPL-MCNC: 0.3 MG/DL (ref 0.1–1)
BUN SERPL-MCNC: 30 MG/DL (ref 8–23)
CALCIUM SERPL-MCNC: 9 MG/DL (ref 8.7–10.5)
CHLORIDE SERPL-SCNC: 107 MMOL/L (ref 95–110)
CO2 SERPL-SCNC: 26 MMOL/L (ref 23–29)
CREAT SERPL-MCNC: 1.8 MG/DL (ref 0.5–1.4)
ERYTHROCYTE [DISTWIDTH] IN BLOOD BY AUTOMATED COUNT: 15.6 % (ref 11.5–14.5)
EST. GFR  (AFRICAN AMERICAN): 32.2 ML/MIN/1.73 M^2
EST. GFR  (NON AFRICAN AMERICAN): 27.9 ML/MIN/1.73 M^2
FERRITIN SERPL-MCNC: 634 NG/ML (ref 20–300)
GLUCOSE SERPL-MCNC: 134 MG/DL (ref 70–110)
HCT VFR BLD AUTO: 31.6 % (ref 37–48.5)
HGB BLD-MCNC: 9.8 G/DL (ref 12–16)
IMM GRANULOCYTES # BLD AUTO: 0.01 K/UL (ref 0–0.04)
LDH SERPL L TO P-CCNC: 194 U/L (ref 110–260)
MCH RBC QN AUTO: 29.1 PG (ref 27–31)
MCHC RBC AUTO-ENTMCNC: 31 G/DL (ref 32–36)
MCV RBC AUTO: 94 FL (ref 82–98)
NEUTROPHILS # BLD AUTO: 2.8 K/UL (ref 1.8–7.7)
PLATELET # BLD AUTO: 246 K/UL (ref 150–350)
PMV BLD AUTO: 11.3 FL (ref 9.2–12.9)
POTASSIUM SERPL-SCNC: 4 MMOL/L (ref 3.5–5.1)
PROT SERPL-MCNC: 6.3 G/DL (ref 6–8.4)
RBC # BLD AUTO: 3.37 M/UL (ref 4–5.4)
SODIUM SERPL-SCNC: 141 MMOL/L (ref 136–145)
WBC # BLD AUTO: 5.16 K/UL (ref 3.9–12.7)

## 2021-03-22 PROCEDURE — 80053 COMPREHEN METABOLIC PANEL: CPT | Performed by: INTERNAL MEDICINE

## 2021-03-22 PROCEDURE — 92014 COMPRE OPH EXAM EST PT 1/>: CPT | Mod: S$GLB,,, | Performed by: OPHTHALMOLOGY

## 2021-03-22 PROCEDURE — 1126F AMNT PAIN NOTED NONE PRSNT: CPT | Mod: S$GLB,,, | Performed by: OPHTHALMOLOGY

## 2021-03-22 PROCEDURE — 1159F MED LIST DOCD IN RCRD: CPT | Mod: S$GLB,,, | Performed by: INTERNAL MEDICINE

## 2021-03-22 PROCEDURE — 92014 PR EYE EXAM, EST PATIENT,COMPREHESV: ICD-10-PCS | Mod: S$GLB,,, | Performed by: OPHTHALMOLOGY

## 2021-03-22 PROCEDURE — 1126F PR PAIN SEVERITY QUANTIFIED, NO PAIN PRESENT: ICD-10-PCS | Mod: S$GLB,,, | Performed by: INTERNAL MEDICINE

## 2021-03-22 PROCEDURE — 1126F PR PAIN SEVERITY QUANTIFIED, NO PAIN PRESENT: ICD-10-PCS | Mod: S$GLB,,, | Performed by: OPHTHALMOLOGY

## 2021-03-22 PROCEDURE — 99999 PR PBB SHADOW E&M-EST. PATIENT-LVL IV: ICD-10-PCS | Mod: PBBFAC,,, | Performed by: OPHTHALMOLOGY

## 2021-03-22 PROCEDURE — 99999 PR PBB SHADOW E&M-EST. PATIENT-LVL IV: CPT | Mod: PBBFAC,,, | Performed by: OPHTHALMOLOGY

## 2021-03-22 PROCEDURE — 1126F AMNT PAIN NOTED NONE PRSNT: CPT | Mod: S$GLB,,, | Performed by: INTERNAL MEDICINE

## 2021-03-22 PROCEDURE — 82728 ASSAY OF FERRITIN: CPT | Performed by: INTERNAL MEDICINE

## 2021-03-22 PROCEDURE — 99499 RISK ADDL DX/OHS AUDIT: ICD-10-PCS | Mod: S$GLB,,, | Performed by: OPHTHALMOLOGY

## 2021-03-22 PROCEDURE — 99999 PR PBB SHADOW E&M-EST. PATIENT-LVL V: CPT | Mod: PBBFAC,,, | Performed by: INTERNAL MEDICINE

## 2021-03-22 PROCEDURE — 1101F PR PT FALLS ASSESS DOC 0-1 FALLS W/OUT INJ PAST YR: ICD-10-PCS | Mod: CPTII,S$GLB,, | Performed by: OPHTHALMOLOGY

## 2021-03-22 PROCEDURE — 3288F PR FALLS RISK ASSESSMENT DOCUMENTED: ICD-10-PCS | Mod: CPTII,S$GLB,, | Performed by: INTERNAL MEDICINE

## 2021-03-22 PROCEDURE — 1101F PT FALLS ASSESS-DOCD LE1/YR: CPT | Mod: CPTII,S$GLB,, | Performed by: OPHTHALMOLOGY

## 2021-03-22 PROCEDURE — 3288F PR FALLS RISK ASSESSMENT DOCUMENTED: ICD-10-PCS | Mod: CPTII,S$GLB,, | Performed by: OPHTHALMOLOGY

## 2021-03-22 PROCEDURE — 92202 PR OPHTHALMOSCOPY, EXT, W/DRAW OPTIC NERVE/MACULA, I&R, UNI/BI: ICD-10-PCS | Mod: S$GLB,,, | Performed by: OPHTHALMOLOGY

## 2021-03-22 PROCEDURE — 3008F PR BODY MASS INDEX (BMI) DOCUMENTED: ICD-10-PCS | Mod: CPTII,S$GLB,, | Performed by: INTERNAL MEDICINE

## 2021-03-22 PROCEDURE — 92202 OPSCPY EXTND ON/MAC DRAW: CPT | Mod: S$GLB,,, | Performed by: OPHTHALMOLOGY

## 2021-03-22 PROCEDURE — 3079F DIAST BP 80-89 MM HG: CPT | Mod: CPTII,S$GLB,, | Performed by: INTERNAL MEDICINE

## 2021-03-22 PROCEDURE — 1101F PR PT FALLS ASSESS DOC 0-1 FALLS W/OUT INJ PAST YR: ICD-10-PCS | Mod: CPTII,S$GLB,, | Performed by: INTERNAL MEDICINE

## 2021-03-22 PROCEDURE — 99999 PR PBB SHADOW E&M-EST. PATIENT-LVL V: ICD-10-PCS | Mod: PBBFAC,,, | Performed by: INTERNAL MEDICINE

## 2021-03-22 PROCEDURE — 3288F FALL RISK ASSESSMENT DOCD: CPT | Mod: CPTII,S$GLB,, | Performed by: INTERNAL MEDICINE

## 2021-03-22 PROCEDURE — 85027 COMPLETE CBC AUTOMATED: CPT | Performed by: INTERNAL MEDICINE

## 2021-03-22 PROCEDURE — 1159F PR MEDICATION LIST DOCUMENTED IN MEDICAL RECORD: ICD-10-PCS | Mod: S$GLB,,, | Performed by: INTERNAL MEDICINE

## 2021-03-22 PROCEDURE — 3074F PR MOST RECENT SYSTOLIC BLOOD PRESSURE < 130 MM HG: ICD-10-PCS | Mod: CPTII,S$GLB,, | Performed by: INTERNAL MEDICINE

## 2021-03-22 PROCEDURE — 3008F BODY MASS INDEX DOCD: CPT | Mod: CPTII,S$GLB,, | Performed by: INTERNAL MEDICINE

## 2021-03-22 PROCEDURE — 83615 LACTATE (LD) (LDH) ENZYME: CPT | Performed by: INTERNAL MEDICINE

## 2021-03-22 PROCEDURE — 3288F FALL RISK ASSESSMENT DOCD: CPT | Mod: CPTII,S$GLB,, | Performed by: OPHTHALMOLOGY

## 2021-03-22 PROCEDURE — 99213 PR OFFICE/OUTPT VISIT, EST, LEVL III, 20-29 MIN: ICD-10-PCS | Mod: S$GLB,,, | Performed by: INTERNAL MEDICINE

## 2021-03-22 PROCEDURE — 3079F PR MOST RECENT DIASTOLIC BLOOD PRESSURE 80-89 MM HG: ICD-10-PCS | Mod: CPTII,S$GLB,, | Performed by: INTERNAL MEDICINE

## 2021-03-22 PROCEDURE — 36415 COLL VENOUS BLD VENIPUNCTURE: CPT | Performed by: INTERNAL MEDICINE

## 2021-03-22 PROCEDURE — 1101F PT FALLS ASSESS-DOCD LE1/YR: CPT | Mod: CPTII,S$GLB,, | Performed by: INTERNAL MEDICINE

## 2021-03-22 PROCEDURE — 99499 UNLISTED E&M SERVICE: CPT | Mod: S$GLB,,, | Performed by: OPHTHALMOLOGY

## 2021-03-22 PROCEDURE — 3074F SYST BP LT 130 MM HG: CPT | Mod: CPTII,S$GLB,, | Performed by: INTERNAL MEDICINE

## 2021-03-22 PROCEDURE — 99499 UNLISTED E&M SERVICE: CPT | Mod: S$GLB,,, | Performed by: INTERNAL MEDICINE

## 2021-03-22 PROCEDURE — 99499 RISK ADDL DX/OHS AUDIT: ICD-10-PCS | Mod: S$GLB,,, | Performed by: INTERNAL MEDICINE

## 2021-03-22 PROCEDURE — 99213 OFFICE O/P EST LOW 20 MIN: CPT | Mod: S$GLB,,, | Performed by: INTERNAL MEDICINE

## 2021-03-24 ENCOUNTER — CLINICAL SUPPORT (OUTPATIENT)
Dept: REHABILITATION | Facility: HOSPITAL | Age: 71
End: 2021-03-24
Attending: INTERNAL MEDICINE
Payer: MEDICARE

## 2021-03-24 DIAGNOSIS — M79.645 PAIN OF LEFT THUMB: ICD-10-CM

## 2021-03-24 DIAGNOSIS — R20.8 DECREASED SENSATION: ICD-10-CM

## 2021-03-24 PROCEDURE — 97018 PARAFFIN BATH THERAPY: CPT | Mod: PO,59

## 2021-03-24 PROCEDURE — 97140 MANUAL THERAPY 1/> REGIONS: CPT | Mod: PO

## 2021-03-24 PROCEDURE — 97530 THERAPEUTIC ACTIVITIES: CPT | Mod: PO

## 2021-03-25 ENCOUNTER — HOSPITAL ENCOUNTER (OUTPATIENT)
Dept: RADIOLOGY | Facility: HOSPITAL | Age: 71
Discharge: HOME | End: 2021-03-25
Attending: ORTHOPAEDIC SURGERY
Payer: MEDICARE

## 2021-03-25 ENCOUNTER — OFFICE VISIT (OUTPATIENT)
Dept: RHEUMATOLOGY | Facility: CLINIC | Age: 71
End: 2021-03-25
Payer: MEDICARE

## 2021-03-25 ENCOUNTER — OFFICE VISIT (OUTPATIENT)
Dept: ORTHOPEDICS | Facility: CLINIC | Age: 71
End: 2021-03-25
Payer: MEDICARE

## 2021-03-25 VITALS — WEIGHT: 293 LBS | BODY MASS INDEX: 48.82 KG/M2 | HEIGHT: 65 IN

## 2021-03-25 VITALS
DIASTOLIC BLOOD PRESSURE: 73 MMHG | HEART RATE: 79 BPM | WEIGHT: 293 LBS | BODY MASS INDEX: 53.78 KG/M2 | SYSTOLIC BLOOD PRESSURE: 136 MMHG

## 2021-03-25 DIAGNOSIS — M25.561 RIGHT KNEE PAIN, UNSPECIFIED CHRONICITY: Primary | ICD-10-CM

## 2021-03-25 DIAGNOSIS — M17.11 PRIMARY OSTEOARTHRITIS OF RIGHT KNEE: ICD-10-CM

## 2021-03-25 DIAGNOSIS — M1A.09X0 IDIOPATHIC CHRONIC GOUT OF MULTIPLE SITES WITHOUT TOPHUS: Primary | ICD-10-CM

## 2021-03-25 DIAGNOSIS — M25.561 RIGHT KNEE PAIN, UNSPECIFIED CHRONICITY: ICD-10-CM

## 2021-03-25 DIAGNOSIS — M47.816 OSTEOARTHRITIS OF LUMBAR SPINE, UNSPECIFIED SPINAL OSTEOARTHRITIS COMPLICATION STATUS: ICD-10-CM

## 2021-03-25 PROCEDURE — 1125F PR PAIN SEVERITY QUANTIFIED, PAIN PRESENT: ICD-10-PCS | Mod: S$GLB,,, | Performed by: ORTHOPAEDIC SURGERY

## 2021-03-25 PROCEDURE — 99999 PR PBB SHADOW E&M-EST. PATIENT-LVL V: CPT | Mod: PBBFAC,,, | Performed by: ORTHOPAEDIC SURGERY

## 2021-03-25 PROCEDURE — 3008F BODY MASS INDEX DOCD: CPT | Mod: CPTII,S$GLB,, | Performed by: INTERNAL MEDICINE

## 2021-03-25 PROCEDURE — 3078F DIAST BP <80 MM HG: CPT | Mod: CPTII,S$GLB,, | Performed by: ORTHOPAEDIC SURGERY

## 2021-03-25 PROCEDURE — 1159F PR MEDICATION LIST DOCUMENTED IN MEDICAL RECORD: ICD-10-PCS | Mod: S$GLB,,, | Performed by: ORTHOPAEDIC SURGERY

## 2021-03-25 PROCEDURE — 3078F PR MOST RECENT DIASTOLIC BLOOD PRESSURE < 80 MM HG: ICD-10-PCS | Mod: CPTII,S$GLB,, | Performed by: ORTHOPAEDIC SURGERY

## 2021-03-25 PROCEDURE — 3074F SYST BP LT 130 MM HG: CPT | Mod: CPTII,S$GLB,, | Performed by: ORTHOPAEDIC SURGERY

## 2021-03-25 PROCEDURE — 1159F MED LIST DOCD IN RCRD: CPT | Mod: S$GLB,,, | Performed by: INTERNAL MEDICINE

## 2021-03-25 PROCEDURE — 3078F DIAST BP <80 MM HG: CPT | Mod: CPTII,S$GLB,, | Performed by: INTERNAL MEDICINE

## 2021-03-25 PROCEDURE — 73560 X-RAY EXAM OF KNEE 1 OR 2: CPT | Mod: 26,LT,, | Performed by: RADIOLOGY

## 2021-03-25 PROCEDURE — 99214 OFFICE O/P EST MOD 30 MIN: CPT | Mod: S$GLB,,, | Performed by: INTERNAL MEDICINE

## 2021-03-25 PROCEDURE — 99499 RISK ADDL DX/OHS AUDIT: ICD-10-PCS | Mod: S$GLB,,, | Performed by: INTERNAL MEDICINE

## 2021-03-25 PROCEDURE — 1159F PR MEDICATION LIST DOCUMENTED IN MEDICAL RECORD: ICD-10-PCS | Mod: S$GLB,,, | Performed by: INTERNAL MEDICINE

## 2021-03-25 PROCEDURE — 3075F PR MOST RECENT SYSTOLIC BLOOD PRESS GE 130-139MM HG: ICD-10-PCS | Mod: CPTII,S$GLB,, | Performed by: INTERNAL MEDICINE

## 2021-03-25 PROCEDURE — 99499 UNLISTED E&M SERVICE: CPT | Mod: S$GLB,,, | Performed by: INTERNAL MEDICINE

## 2021-03-25 PROCEDURE — 99999 PR PBB SHADOW E&M-EST. PATIENT-LVL V: ICD-10-PCS | Mod: PBBFAC,,, | Performed by: INTERNAL MEDICINE

## 2021-03-25 PROCEDURE — 1159F MED LIST DOCD IN RCRD: CPT | Mod: S$GLB,,, | Performed by: ORTHOPAEDIC SURGERY

## 2021-03-25 PROCEDURE — 3008F PR BODY MASS INDEX (BMI) DOCUMENTED: ICD-10-PCS | Mod: CPTII,S$GLB,, | Performed by: INTERNAL MEDICINE

## 2021-03-25 PROCEDURE — 1125F PR PAIN SEVERITY QUANTIFIED, PAIN PRESENT: ICD-10-PCS | Mod: S$GLB,,, | Performed by: INTERNAL MEDICINE

## 2021-03-25 PROCEDURE — 3288F PR FALLS RISK ASSESSMENT DOCUMENTED: ICD-10-PCS | Mod: CPTII,S$GLB,, | Performed by: ORTHOPAEDIC SURGERY

## 2021-03-25 PROCEDURE — 73562 XR KNEE ORTHO RIGHT: ICD-10-PCS | Mod: 26,RT,, | Performed by: RADIOLOGY

## 2021-03-25 PROCEDURE — 99999 PR PBB SHADOW E&M-EST. PATIENT-LVL V: ICD-10-PCS | Mod: PBBFAC,,, | Performed by: ORTHOPAEDIC SURGERY

## 2021-03-25 PROCEDURE — 1125F AMNT PAIN NOTED PAIN PRSNT: CPT | Mod: S$GLB,,, | Performed by: INTERNAL MEDICINE

## 2021-03-25 PROCEDURE — 3288F FALL RISK ASSESSMENT DOCD: CPT | Mod: CPTII,S$GLB,, | Performed by: ORTHOPAEDIC SURGERY

## 2021-03-25 PROCEDURE — 99214 PR OFFICE/OUTPT VISIT, EST, LEVL IV, 30-39 MIN: ICD-10-PCS | Mod: S$GLB,,, | Performed by: INTERNAL MEDICINE

## 2021-03-25 PROCEDURE — 3008F BODY MASS INDEX DOCD: CPT | Mod: CPTII,S$GLB,, | Performed by: ORTHOPAEDIC SURGERY

## 2021-03-25 PROCEDURE — 1100F PR PT FALLS ASSESS DOC 2+ FALLS/FALL W/INJURY/YR: ICD-10-PCS | Mod: CPTII,S$GLB,, | Performed by: ORTHOPAEDIC SURGERY

## 2021-03-25 PROCEDURE — 73560 X-RAY EXAM OF KNEE 1 OR 2: CPT | Mod: TC,LT,59

## 2021-03-25 PROCEDURE — 99213 PR OFFICE/OUTPT VISIT, EST, LEVL III, 20-29 MIN: ICD-10-PCS | Mod: S$GLB,,, | Performed by: ORTHOPAEDIC SURGERY

## 2021-03-25 PROCEDURE — 3075F SYST BP GE 130 - 139MM HG: CPT | Mod: CPTII,S$GLB,, | Performed by: INTERNAL MEDICINE

## 2021-03-25 PROCEDURE — 73560 XR KNEE ORTHO RIGHT: ICD-10-PCS | Mod: 26,LT,, | Performed by: RADIOLOGY

## 2021-03-25 PROCEDURE — 99213 OFFICE O/P EST LOW 20 MIN: CPT | Mod: S$GLB,,, | Performed by: ORTHOPAEDIC SURGERY

## 2021-03-25 PROCEDURE — 99999 PR PBB SHADOW E&M-EST. PATIENT-LVL V: CPT | Mod: PBBFAC,,, | Performed by: INTERNAL MEDICINE

## 2021-03-25 PROCEDURE — 1125F AMNT PAIN NOTED PAIN PRSNT: CPT | Mod: S$GLB,,, | Performed by: ORTHOPAEDIC SURGERY

## 2021-03-25 PROCEDURE — 3078F PR MOST RECENT DIASTOLIC BLOOD PRESSURE < 80 MM HG: ICD-10-PCS | Mod: CPTII,S$GLB,, | Performed by: INTERNAL MEDICINE

## 2021-03-25 PROCEDURE — 73562 X-RAY EXAM OF KNEE 3: CPT | Mod: 26,RT,, | Performed by: RADIOLOGY

## 2021-03-25 PROCEDURE — 3008F PR BODY MASS INDEX (BMI) DOCUMENTED: ICD-10-PCS | Mod: CPTII,S$GLB,, | Performed by: ORTHOPAEDIC SURGERY

## 2021-03-25 PROCEDURE — 3074F PR MOST RECENT SYSTOLIC BLOOD PRESSURE < 130 MM HG: ICD-10-PCS | Mod: CPTII,S$GLB,, | Performed by: ORTHOPAEDIC SURGERY

## 2021-03-25 PROCEDURE — 1100F PTFALLS ASSESS-DOCD GE2>/YR: CPT | Mod: CPTII,S$GLB,, | Performed by: ORTHOPAEDIC SURGERY

## 2021-03-25 RX ORDER — ALLOPURINOL 300 MG/1
300 TABLET ORAL DAILY
Qty: 90 TABLET | Refills: 4 | Status: SHIPPED | OUTPATIENT
Start: 2021-03-25 | End: 2021-06-23

## 2021-03-25 RX ORDER — METHYLPREDNISOLONE 4 MG/1
TABLET ORAL
Qty: 1 PACKAGE | Refills: 0 | Status: SHIPPED | OUTPATIENT
Start: 2021-03-25 | End: 2021-09-13 | Stop reason: ALTCHOICE

## 2021-03-25 ASSESSMENT — ROUTINE ASSESSMENT OF PATIENT INDEX DATA (RAPID3)
AM STIFFNESS SCORE: 1, YES
FATIGUE SCORE: 5
MDHAQ FUNCTION SCORE: 1.1
PAIN SCORE: 2.5
PSYCHOLOGICAL DISTRESS SCORE: 1.1
PATIENT GLOBAL ASSESSMENT SCORE: 5
TOTAL RAPID3 SCORE: 3.72

## 2021-03-29 ENCOUNTER — CLINICAL SUPPORT (OUTPATIENT)
Dept: REHABILITATION | Facility: HOSPITAL | Age: 71
End: 2021-03-29
Attending: STUDENT IN AN ORGANIZED HEALTH CARE EDUCATION/TRAINING PROGRAM
Payer: MEDICARE

## 2021-03-29 DIAGNOSIS — M79.645 PAIN OF LEFT THUMB: ICD-10-CM

## 2021-03-29 DIAGNOSIS — R20.8 DECREASED SENSATION: ICD-10-CM

## 2021-03-29 PROCEDURE — 97110 THERAPEUTIC EXERCISES: CPT | Mod: PO

## 2021-03-29 PROCEDURE — 97018 PARAFFIN BATH THERAPY: CPT | Mod: PO

## 2021-03-30 ENCOUNTER — OFFICE VISIT (OUTPATIENT)
Dept: ORTHOPEDICS | Facility: CLINIC | Age: 71
End: 2021-03-30
Payer: MEDICARE

## 2021-03-30 VITALS — BODY MASS INDEX: 48.82 KG/M2 | HEIGHT: 65 IN | WEIGHT: 293 LBS

## 2021-03-30 DIAGNOSIS — M18.12 ARTHRITIS OF CARPOMETACARPAL (CMC) JOINT OF LEFT THUMB: Primary | ICD-10-CM

## 2021-03-30 PROCEDURE — 3008F BODY MASS INDEX DOCD: CPT | Mod: CPTII,S$GLB,, | Performed by: ORTHOPAEDIC SURGERY

## 2021-03-30 PROCEDURE — 3008F PR BODY MASS INDEX (BMI) DOCUMENTED: ICD-10-PCS | Mod: CPTII,S$GLB,, | Performed by: ORTHOPAEDIC SURGERY

## 2021-03-30 PROCEDURE — 1101F PR PT FALLS ASSESS DOC 0-1 FALLS W/OUT INJ PAST YR: ICD-10-PCS | Mod: CPTII,S$GLB,, | Performed by: ORTHOPAEDIC SURGERY

## 2021-03-30 PROCEDURE — 99024 POSTOP FOLLOW-UP VISIT: CPT | Mod: S$GLB,,, | Performed by: ORTHOPAEDIC SURGERY

## 2021-03-30 PROCEDURE — 3288F FALL RISK ASSESSMENT DOCD: CPT | Mod: CPTII,S$GLB,, | Performed by: ORTHOPAEDIC SURGERY

## 2021-03-30 PROCEDURE — 99999 PR PBB SHADOW E&M-EST. PATIENT-LVL III: ICD-10-PCS | Mod: PBBFAC,,, | Performed by: ORTHOPAEDIC SURGERY

## 2021-03-30 PROCEDURE — 1125F PR PAIN SEVERITY QUANTIFIED, PAIN PRESENT: ICD-10-PCS | Mod: S$GLB,,, | Performed by: ORTHOPAEDIC SURGERY

## 2021-03-30 PROCEDURE — 99999 PR PBB SHADOW E&M-EST. PATIENT-LVL III: CPT | Mod: PBBFAC,,, | Performed by: ORTHOPAEDIC SURGERY

## 2021-03-30 PROCEDURE — 1101F PT FALLS ASSESS-DOCD LE1/YR: CPT | Mod: CPTII,S$GLB,, | Performed by: ORTHOPAEDIC SURGERY

## 2021-03-30 PROCEDURE — 1125F AMNT PAIN NOTED PAIN PRSNT: CPT | Mod: S$GLB,,, | Performed by: ORTHOPAEDIC SURGERY

## 2021-03-30 PROCEDURE — 99024 PR POST-OP FOLLOW-UP VISIT: ICD-10-PCS | Mod: S$GLB,,, | Performed by: ORTHOPAEDIC SURGERY

## 2021-03-30 PROCEDURE — 3288F PR FALLS RISK ASSESSMENT DOCUMENTED: ICD-10-PCS | Mod: CPTII,S$GLB,, | Performed by: ORTHOPAEDIC SURGERY

## 2021-03-31 ENCOUNTER — CLINICAL SUPPORT (OUTPATIENT)
Dept: REHABILITATION | Facility: HOSPITAL | Age: 71
End: 2021-03-31
Attending: STUDENT IN AN ORGANIZED HEALTH CARE EDUCATION/TRAINING PROGRAM
Payer: MEDICARE

## 2021-03-31 DIAGNOSIS — M79.645 PAIN OF LEFT THUMB: ICD-10-CM

## 2021-03-31 DIAGNOSIS — R20.8 DECREASED SENSATION: ICD-10-CM

## 2021-03-31 DIAGNOSIS — M25.642 DECREASED RANGE OF MOTION OF LEFT THUMB: ICD-10-CM

## 2021-03-31 PROCEDURE — 97035 APP MDLTY 1+ULTRASOUND EA 15: CPT | Mod: PO

## 2021-03-31 PROCEDURE — 97140 MANUAL THERAPY 1/> REGIONS: CPT | Mod: PO

## 2021-03-31 PROCEDURE — 97110 THERAPEUTIC EXERCISES: CPT | Mod: PO

## 2021-04-07 ENCOUNTER — CLINICAL SUPPORT (OUTPATIENT)
Dept: REHABILITATION | Facility: HOSPITAL | Age: 71
End: 2021-04-07
Attending: STUDENT IN AN ORGANIZED HEALTH CARE EDUCATION/TRAINING PROGRAM
Payer: MEDICARE

## 2021-04-07 ENCOUNTER — OFFICE VISIT (OUTPATIENT)
Dept: PODIATRY | Facility: CLINIC | Age: 71
End: 2021-04-07
Payer: MEDICARE

## 2021-04-07 VITALS
WEIGHT: 293 LBS | RESPIRATION RATE: 19 BRPM | DIASTOLIC BLOOD PRESSURE: 47 MMHG | SYSTOLIC BLOOD PRESSURE: 125 MMHG | HEART RATE: 75 BPM | HEIGHT: 65 IN | BODY MASS INDEX: 48.82 KG/M2

## 2021-04-07 DIAGNOSIS — E08.43 DIABETIC AUTONOMIC NEUROPATHY ASSOCIATED WITH DIABETES MELLITUS DUE TO UNDERLYING CONDITION: Primary | ICD-10-CM

## 2021-04-07 DIAGNOSIS — M76.821 POSTERIOR TIBIAL TENDINITIS OF RIGHT LEG: ICD-10-CM

## 2021-04-07 DIAGNOSIS — G89.29 CHRONIC BILATERAL LOW BACK PAIN WITHOUT SCIATICA: ICD-10-CM

## 2021-04-07 DIAGNOSIS — M47.816 OSTEOARTHRITIS OF LUMBAR SPINE, UNSPECIFIED SPINAL OSTEOARTHRITIS COMPLICATION STATUS: ICD-10-CM

## 2021-04-07 DIAGNOSIS — L84 CORN OR CALLUS: ICD-10-CM

## 2021-04-07 DIAGNOSIS — M54.50 CHRONIC BILATERAL LOW BACK PAIN WITHOUT SCIATICA: ICD-10-CM

## 2021-04-07 DIAGNOSIS — R26.2 DIFFICULTY WALKING: ICD-10-CM

## 2021-04-07 DIAGNOSIS — M62.81 MUSCLE WEAKNESS OF LOWER EXTREMITY: ICD-10-CM

## 2021-04-07 PROCEDURE — 1159F MED LIST DOCD IN RCRD: CPT | Mod: S$GLB,,, | Performed by: PODIATRIST

## 2021-04-07 PROCEDURE — 11721 PR DEBRIDEMENT OF NAILS, 6 OR MORE: ICD-10-PCS | Mod: Q9,59,S$GLB, | Performed by: PODIATRIST

## 2021-04-07 PROCEDURE — 11056 PR TRIM BENIGN HYPERKERATOTIC SKIN LESION,2-4: ICD-10-PCS | Mod: Q9,S$GLB,, | Performed by: PODIATRIST

## 2021-04-07 PROCEDURE — 99999 PR PBB SHADOW E&M-EST. PATIENT-LVL V: ICD-10-PCS | Mod: PBBFAC,,, | Performed by: PODIATRIST

## 2021-04-07 PROCEDURE — 3008F BODY MASS INDEX DOCD: CPT | Mod: CPTII,S$GLB,, | Performed by: PODIATRIST

## 2021-04-07 PROCEDURE — 1126F AMNT PAIN NOTED NONE PRSNT: CPT | Mod: S$GLB,,, | Performed by: PODIATRIST

## 2021-04-07 PROCEDURE — 3008F PR BODY MASS INDEX (BMI) DOCUMENTED: ICD-10-PCS | Mod: CPTII,S$GLB,, | Performed by: PODIATRIST

## 2021-04-07 PROCEDURE — 1159F PR MEDICATION LIST DOCUMENTED IN MEDICAL RECORD: ICD-10-PCS | Mod: S$GLB,,, | Performed by: PODIATRIST

## 2021-04-07 PROCEDURE — 1126F PR PAIN SEVERITY QUANTIFIED, NO PAIN PRESENT: ICD-10-PCS | Mod: S$GLB,,, | Performed by: PODIATRIST

## 2021-04-07 PROCEDURE — 99999 PR PBB SHADOW E&M-EST. PATIENT-LVL V: CPT | Mod: PBBFAC,,, | Performed by: PODIATRIST

## 2021-04-07 PROCEDURE — 97162 PT EVAL MOD COMPLEX 30 MIN: CPT | Mod: PO

## 2021-04-07 PROCEDURE — 11056 PARNG/CUTG B9 HYPRKR LES 2-4: CPT | Mod: Q9,S$GLB,, | Performed by: PODIATRIST

## 2021-04-07 PROCEDURE — 99213 PR OFFICE/OUTPT VISIT, EST, LEVL III, 20-29 MIN: ICD-10-PCS | Mod: 25,S$GLB,, | Performed by: PODIATRIST

## 2021-04-07 PROCEDURE — 11721 DEBRIDE NAIL 6 OR MORE: CPT | Mod: Q9,59,S$GLB, | Performed by: PODIATRIST

## 2021-04-07 PROCEDURE — 99213 OFFICE O/P EST LOW 20 MIN: CPT | Mod: 25,S$GLB,, | Performed by: PODIATRIST

## 2021-04-07 RX ORDER — KETOCONAZOLE 20 MG/G
CREAM TOPICAL DAILY
Qty: 1 TUBE | Refills: 2 | Status: SHIPPED | OUTPATIENT
Start: 2021-04-07 | End: 2021-04-07

## 2021-04-07 RX ORDER — LIDOCAINE HYDROCHLORIDE 20 MG/ML
JELLY TOPICAL
Qty: 30 ML | Refills: 2 | Status: SHIPPED | OUTPATIENT
Start: 2021-04-07 | End: 2022-02-17

## 2021-04-07 RX ORDER — KETOCONAZOLE 20 MG/G
CREAM TOPICAL DAILY
Qty: 1 TUBE | Refills: 2 | Status: SHIPPED | OUTPATIENT
Start: 2021-04-07 | End: 2021-07-08 | Stop reason: SDUPTHER

## 2021-04-08 PROBLEM — M62.81 MUSCLE WEAKNESS OF LOWER EXTREMITY: Status: ACTIVE | Noted: 2021-04-08

## 2021-04-08 PROBLEM — M54.50 CHRONIC BILATERAL LOW BACK PAIN WITHOUT SCIATICA: Status: ACTIVE | Noted: 2021-04-08

## 2021-04-08 PROBLEM — R26.2 DIFFICULTY WALKING: Status: ACTIVE | Noted: 2021-04-08

## 2021-04-08 PROBLEM — G89.29 CHRONIC BILATERAL LOW BACK PAIN WITHOUT SCIATICA: Status: ACTIVE | Noted: 2021-04-08

## 2021-04-14 ENCOUNTER — PATIENT OUTREACH (OUTPATIENT)
Dept: ADMINISTRATIVE | Facility: OTHER | Age: 71
End: 2021-04-14

## 2021-04-15 ENCOUNTER — OFFICE VISIT (OUTPATIENT)
Dept: SLEEP MEDICINE | Facility: CLINIC | Age: 71
End: 2021-04-15
Payer: MEDICARE

## 2021-04-15 VITALS
SYSTOLIC BLOOD PRESSURE: 127 MMHG | DIASTOLIC BLOOD PRESSURE: 64 MMHG | HEART RATE: 76 BPM | HEIGHT: 65 IN | BODY MASS INDEX: 48.82 KG/M2 | WEIGHT: 293 LBS

## 2021-04-15 DIAGNOSIS — G47.33 OSA (OBSTRUCTIVE SLEEP APNEA): Primary | ICD-10-CM

## 2021-04-15 DIAGNOSIS — Z86.73 HISTORY OF STROKE: ICD-10-CM

## 2021-04-15 DIAGNOSIS — Z79.4 TYPE 2 DIABETES MELLITUS WITH DIABETIC POLYNEUROPATHY, WITH LONG-TERM CURRENT USE OF INSULIN: ICD-10-CM

## 2021-04-15 DIAGNOSIS — I10 ESSENTIAL HYPERTENSION: ICD-10-CM

## 2021-04-15 DIAGNOSIS — E11.42 TYPE 2 DIABETES MELLITUS WITH DIABETIC POLYNEUROPATHY, WITH LONG-TERM CURRENT USE OF INSULIN: ICD-10-CM

## 2021-04-15 DIAGNOSIS — E66.01 MORBID OBESITY WITH BMI OF 50.0-59.9, ADULT: ICD-10-CM

## 2021-04-15 DIAGNOSIS — I73.9 PAD (PERIPHERAL ARTERY DISEASE): ICD-10-CM

## 2021-04-15 PROCEDURE — 1159F PR MEDICATION LIST DOCUMENTED IN MEDICAL RECORD: ICD-10-PCS | Mod: S$GLB,,, | Performed by: INTERNAL MEDICINE

## 2021-04-15 PROCEDURE — 3288F FALL RISK ASSESSMENT DOCD: CPT | Mod: CPTII,S$GLB,, | Performed by: INTERNAL MEDICINE

## 2021-04-15 PROCEDURE — 99213 PR OFFICE/OUTPT VISIT, EST, LEVL III, 20-29 MIN: ICD-10-PCS | Mod: S$GLB,,, | Performed by: INTERNAL MEDICINE

## 2021-04-15 PROCEDURE — 1101F PR PT FALLS ASSESS DOC 0-1 FALLS W/OUT INJ PAST YR: ICD-10-PCS | Mod: CPTII,S$GLB,, | Performed by: INTERNAL MEDICINE

## 2021-04-15 PROCEDURE — 1126F PR PAIN SEVERITY QUANTIFIED, NO PAIN PRESENT: ICD-10-PCS | Mod: S$GLB,,, | Performed by: INTERNAL MEDICINE

## 2021-04-15 PROCEDURE — 3008F BODY MASS INDEX DOCD: CPT | Mod: CPTII,S$GLB,, | Performed by: INTERNAL MEDICINE

## 2021-04-15 PROCEDURE — 3288F PR FALLS RISK ASSESSMENT DOCUMENTED: ICD-10-PCS | Mod: CPTII,S$GLB,, | Performed by: INTERNAL MEDICINE

## 2021-04-15 PROCEDURE — 3008F PR BODY MASS INDEX (BMI) DOCUMENTED: ICD-10-PCS | Mod: CPTII,S$GLB,, | Performed by: INTERNAL MEDICINE

## 2021-04-15 PROCEDURE — 99999 PR PBB SHADOW E&M-EST. PATIENT-LVL IV: CPT | Mod: PBBFAC,,, | Performed by: INTERNAL MEDICINE

## 2021-04-15 PROCEDURE — 99999 PR PBB SHADOW E&M-EST. PATIENT-LVL IV: ICD-10-PCS | Mod: PBBFAC,,, | Performed by: INTERNAL MEDICINE

## 2021-04-15 PROCEDURE — 1126F AMNT PAIN NOTED NONE PRSNT: CPT | Mod: S$GLB,,, | Performed by: INTERNAL MEDICINE

## 2021-04-15 PROCEDURE — 99213 OFFICE O/P EST LOW 20 MIN: CPT | Mod: S$GLB,,, | Performed by: INTERNAL MEDICINE

## 2021-04-15 PROCEDURE — 1101F PT FALLS ASSESS-DOCD LE1/YR: CPT | Mod: CPTII,S$GLB,, | Performed by: INTERNAL MEDICINE

## 2021-04-15 PROCEDURE — 1159F MED LIST DOCD IN RCRD: CPT | Mod: S$GLB,,, | Performed by: INTERNAL MEDICINE

## 2021-04-28 ENCOUNTER — OFFICE VISIT (OUTPATIENT)
Dept: PAIN MEDICINE | Facility: CLINIC | Age: 71
End: 2021-04-28
Payer: MEDICARE

## 2021-04-28 ENCOUNTER — TELEPHONE (OUTPATIENT)
Dept: PAIN MEDICINE | Facility: CLINIC | Age: 71
End: 2021-04-28

## 2021-04-28 VITALS — HEART RATE: 85 BPM | DIASTOLIC BLOOD PRESSURE: 78 MMHG | SYSTOLIC BLOOD PRESSURE: 132 MMHG

## 2021-04-28 DIAGNOSIS — M17.11 PRIMARY OSTEOARTHRITIS OF RIGHT KNEE: ICD-10-CM

## 2021-04-28 DIAGNOSIS — M54.42 CHRONIC BILATERAL LOW BACK PAIN WITH BILATERAL SCIATICA: Primary | ICD-10-CM

## 2021-04-28 DIAGNOSIS — G89.29 CHRONIC BILATERAL LOW BACK PAIN WITH BILATERAL SCIATICA: Primary | ICD-10-CM

## 2021-04-28 DIAGNOSIS — M54.41 CHRONIC BILATERAL LOW BACK PAIN WITH BILATERAL SCIATICA: Primary | ICD-10-CM

## 2021-04-28 DIAGNOSIS — M47.816 LUMBAR SPONDYLOSIS: Primary | ICD-10-CM

## 2021-04-28 DIAGNOSIS — M51.36 DDD (DEGENERATIVE DISC DISEASE), LUMBAR: ICD-10-CM

## 2021-04-28 PROBLEM — M51.369 DDD (DEGENERATIVE DISC DISEASE), LUMBAR: Status: ACTIVE | Noted: 2021-04-28

## 2021-04-28 PROCEDURE — 99999 PR PBB SHADOW E&M-EST. PATIENT-LVL III: ICD-10-PCS | Mod: PBBFAC,,, | Performed by: PAIN MEDICINE

## 2021-04-28 PROCEDURE — 99999 PR PBB SHADOW E&M-EST. PATIENT-LVL III: CPT | Mod: PBBFAC,,, | Performed by: PAIN MEDICINE

## 2021-04-28 PROCEDURE — 1125F PR PAIN SEVERITY QUANTIFIED, PAIN PRESENT: ICD-10-PCS | Mod: S$GLB,,, | Performed by: PAIN MEDICINE

## 2021-04-28 PROCEDURE — 1125F AMNT PAIN NOTED PAIN PRSNT: CPT | Mod: S$GLB,,, | Performed by: PAIN MEDICINE

## 2021-04-28 PROCEDURE — 1159F PR MEDICATION LIST DOCUMENTED IN MEDICAL RECORD: ICD-10-PCS | Mod: S$GLB,,, | Performed by: PAIN MEDICINE

## 2021-04-28 PROCEDURE — 1159F MED LIST DOCD IN RCRD: CPT | Mod: S$GLB,,, | Performed by: PAIN MEDICINE

## 2021-04-28 PROCEDURE — 99204 OFFICE O/P NEW MOD 45 MIN: CPT | Mod: S$GLB,,, | Performed by: PAIN MEDICINE

## 2021-04-28 PROCEDURE — 99204 PR OFFICE/OUTPT VISIT, NEW, LEVL IV, 45-59 MIN: ICD-10-PCS | Mod: S$GLB,,, | Performed by: PAIN MEDICINE

## 2021-05-03 ENCOUNTER — TELEPHONE (OUTPATIENT)
Dept: INTERNAL MEDICINE | Facility: CLINIC | Age: 71
End: 2021-05-03

## 2021-05-03 ENCOUNTER — TELEPHONE (OUTPATIENT)
Dept: SLEEP MEDICINE | Facility: CLINIC | Age: 71
End: 2021-05-03

## 2021-05-04 ENCOUNTER — PATIENT MESSAGE (OUTPATIENT)
Dept: ENDOCRINOLOGY | Facility: CLINIC | Age: 71
End: 2021-05-04

## 2021-05-04 ENCOUNTER — CLINICAL SUPPORT (OUTPATIENT)
Dept: DIABETES | Facility: CLINIC | Age: 71
End: 2021-05-04
Payer: MEDICARE

## 2021-05-04 DIAGNOSIS — Z79.4 TYPE 2 DIABETES MELLITUS WITH DIABETIC POLYNEUROPATHY, WITH LONG-TERM CURRENT USE OF INSULIN: ICD-10-CM

## 2021-05-04 DIAGNOSIS — E11.42 TYPE 2 DIABETES MELLITUS WITH DIABETIC POLYNEUROPATHY, WITH LONG-TERM CURRENT USE OF INSULIN: ICD-10-CM

## 2021-05-04 PROCEDURE — G0108 DIAB MANAGE TRN  PER INDIV: HCPCS | Mod: S$GLB,,, | Performed by: INTERNAL MEDICINE

## 2021-05-04 PROCEDURE — G0108 PR DIAB MANAGE TRN  PER INDIV: ICD-10-PCS | Mod: S$GLB,,, | Performed by: INTERNAL MEDICINE

## 2021-05-05 ENCOUNTER — CLINICAL SUPPORT (OUTPATIENT)
Dept: REHABILITATION | Facility: HOSPITAL | Age: 71
End: 2021-05-05
Attending: STUDENT IN AN ORGANIZED HEALTH CARE EDUCATION/TRAINING PROGRAM
Payer: MEDICARE

## 2021-05-05 DIAGNOSIS — M54.50 CHRONIC BILATERAL LOW BACK PAIN WITHOUT SCIATICA: ICD-10-CM

## 2021-05-05 DIAGNOSIS — R26.2 DIFFICULTY WALKING: ICD-10-CM

## 2021-05-05 DIAGNOSIS — G89.29 CHRONIC BILATERAL LOW BACK PAIN WITHOUT SCIATICA: ICD-10-CM

## 2021-05-05 DIAGNOSIS — M62.81 MUSCLE WEAKNESS OF LOWER EXTREMITY: ICD-10-CM

## 2021-05-05 PROCEDURE — 97110 THERAPEUTIC EXERCISES: CPT | Mod: PO

## 2021-05-06 ENCOUNTER — TELEPHONE (OUTPATIENT)
Dept: ENDOCRINOLOGY | Facility: CLINIC | Age: 71
End: 2021-05-06

## 2021-05-06 ENCOUNTER — HOSPITAL ENCOUNTER (OUTPATIENT)
Facility: HOSPITAL | Age: 71
Discharge: HOME | End: 2021-05-06
Attending: PAIN MEDICINE | Admitting: PAIN MEDICINE
Payer: MEDICARE

## 2021-05-06 VITALS
WEIGHT: 210 LBS | SYSTOLIC BLOOD PRESSURE: 159 MMHG | BODY MASS INDEX: 34.99 KG/M2 | TEMPERATURE: 97 F | HEART RATE: 74 BPM | DIASTOLIC BLOOD PRESSURE: 72 MMHG | RESPIRATION RATE: 18 BRPM | OXYGEN SATURATION: 98 % | HEIGHT: 65 IN

## 2021-05-06 DIAGNOSIS — E11.42 TYPE 2 DIABETES MELLITUS WITH DIABETIC POLYNEUROPATHY, WITH LONG-TERM CURRENT USE OF INSULIN: Primary | ICD-10-CM

## 2021-05-06 DIAGNOSIS — M47.816 LUMBAR SPONDYLOSIS: Primary | ICD-10-CM

## 2021-05-06 DIAGNOSIS — G89.29 CHRONIC PAIN: ICD-10-CM

## 2021-05-06 DIAGNOSIS — Z79.4 TYPE 2 DIABETES MELLITUS WITH DIABETIC POLYNEUROPATHY, WITH LONG-TERM CURRENT USE OF INSULIN: Primary | ICD-10-CM

## 2021-05-06 LAB — POCT GLUCOSE: 160 MG/DL (ref 70–110)

## 2021-05-06 PROCEDURE — 25000003 PHARM REV CODE 250: Performed by: PAIN MEDICINE

## 2021-05-06 PROCEDURE — 64494 INJ PARAVERT F JNT L/S 2 LEV: CPT | Mod: 50 | Performed by: PAIN MEDICINE

## 2021-05-06 PROCEDURE — 64494 INJ PARAVERT F JNT L/S 2 LEV: CPT | Mod: 50,,, | Performed by: PAIN MEDICINE

## 2021-05-06 PROCEDURE — 63600175 PHARM REV CODE 636 W HCPCS: Performed by: PAIN MEDICINE

## 2021-05-06 PROCEDURE — 64493 INJ PARAVERT F JNT L/S 1 LEV: CPT | Mod: 50,,, | Performed by: PAIN MEDICINE

## 2021-05-06 PROCEDURE — 64493 PR INJ DX/THER AGNT PARAVERT FACET JOINT,IMG GUIDE,LUMBAR/SAC,1ST LVL: ICD-10-PCS | Mod: 50,,, | Performed by: PAIN MEDICINE

## 2021-05-06 PROCEDURE — 64494 PR INJ DX/THER AGNT PARAVERT FACET JOINT,IMG GUIDE,LUMBAR/SAC, 2ND LEVEL: ICD-10-PCS | Mod: 50,,, | Performed by: PAIN MEDICINE

## 2021-05-06 PROCEDURE — 64493 INJ PARAVERT F JNT L/S 1 LEV: CPT | Mod: 50 | Performed by: PAIN MEDICINE

## 2021-05-06 RX ORDER — SODIUM BICARBONATE 1 MEQ/ML
SYRINGE (ML) INTRAVENOUS
Status: DISCONTINUED | OUTPATIENT
Start: 2021-05-06 | End: 2021-05-06 | Stop reason: HOSPADM

## 2021-05-06 RX ORDER — METHYLPREDNISOLONE ACETATE 40 MG/ML
INJECTION, SUSPENSION INTRA-ARTICULAR; INTRALESIONAL; INTRAMUSCULAR; SOFT TISSUE
Status: DISCONTINUED | OUTPATIENT
Start: 2021-05-06 | End: 2021-05-06 | Stop reason: HOSPADM

## 2021-05-06 RX ORDER — BUPIVACAINE HYDROCHLORIDE 2.5 MG/ML
INJECTION, SOLUTION EPIDURAL; INFILTRATION; INTRACAUDAL
Status: DISCONTINUED | OUTPATIENT
Start: 2021-05-06 | End: 2021-05-06 | Stop reason: HOSPADM

## 2021-05-06 RX ORDER — LIDOCAINE HYDROCHLORIDE 10 MG/ML
INJECTION INFILTRATION; PERINEURAL
Status: DISCONTINUED | OUTPATIENT
Start: 2021-05-06 | End: 2021-05-06 | Stop reason: HOSPADM

## 2021-05-06 RX ORDER — GLUCAGON INJECTION, SOLUTION 0.5 MG/.1ML
1 INJECTION, SOLUTION SUBCUTANEOUS DAILY PRN
Qty: 1 SYRINGE | Refills: 3 | Status: SHIPPED | OUTPATIENT
Start: 2021-05-06 | End: 2021-09-13 | Stop reason: SDUPTHER

## 2021-05-06 RX ORDER — ALPRAZOLAM 0.5 MG/1
0.5 TABLET, ORALLY DISINTEGRATING ORAL ONCE AS NEEDED
Status: COMPLETED | OUTPATIENT
Start: 2021-05-06 | End: 2021-05-06

## 2021-05-06 RX ADMIN — ALPRAZOLAM 0.5 MG: 0.5 TABLET, ORALLY DISINTEGRATING ORAL at 08:05

## 2021-05-07 ENCOUNTER — TELEPHONE (OUTPATIENT)
Dept: BARIATRICS | Facility: CLINIC | Age: 71
End: 2021-05-07

## 2021-05-12 ENCOUNTER — CLINICAL SUPPORT (OUTPATIENT)
Dept: REHABILITATION | Facility: HOSPITAL | Age: 71
End: 2021-05-12
Attending: STUDENT IN AN ORGANIZED HEALTH CARE EDUCATION/TRAINING PROGRAM
Payer: MEDICARE

## 2021-05-12 ENCOUNTER — TELEPHONE (OUTPATIENT)
Dept: INTERNAL MEDICINE | Facility: CLINIC | Age: 71
End: 2021-05-12

## 2021-05-12 DIAGNOSIS — G63 POLYNEUROPATHY ASSOCIATED WITH UNDERLYING DISEASE: ICD-10-CM

## 2021-05-12 DIAGNOSIS — E87.20 ACIDOSIS, METABOLIC: ICD-10-CM

## 2021-05-12 DIAGNOSIS — M47.816 LUMBAR FACET ARTHROPATHY: ICD-10-CM

## 2021-05-12 DIAGNOSIS — I10 ESSENTIAL HYPERTENSION: ICD-10-CM

## 2021-05-12 DIAGNOSIS — G89.29 CHRONIC BILATERAL LOW BACK PAIN WITHOUT SCIATICA: ICD-10-CM

## 2021-05-12 DIAGNOSIS — E11.43 DIABETIC AUTONOMIC NEUROPATHY ASSOCIATED WITH TYPE 2 DIABETES MELLITUS: ICD-10-CM

## 2021-05-12 DIAGNOSIS — M54.40 CHRONIC LOW BACK PAIN WITH SCIATICA, SCIATICA LATERALITY UNSPECIFIED, UNSPECIFIED BACK PAIN LATERALITY: ICD-10-CM

## 2021-05-12 DIAGNOSIS — M17.11 PRIMARY OSTEOARTHRITIS OF RIGHT KNEE: ICD-10-CM

## 2021-05-12 DIAGNOSIS — E78.2 MIXED HYPERLIPIDEMIA: ICD-10-CM

## 2021-05-12 DIAGNOSIS — N18.32 STAGE 3B CHRONIC KIDNEY DISEASE: ICD-10-CM

## 2021-05-12 DIAGNOSIS — M62.81 MUSCLE WEAKNESS OF LOWER EXTREMITY: ICD-10-CM

## 2021-05-12 DIAGNOSIS — G89.29 CHRONIC LOW BACK PAIN WITH SCIATICA, SCIATICA LATERALITY UNSPECIFIED, UNSPECIFIED BACK PAIN LATERALITY: ICD-10-CM

## 2021-05-12 DIAGNOSIS — R26.2 DIFFICULTY WALKING: ICD-10-CM

## 2021-05-12 DIAGNOSIS — M54.50 CHRONIC BILATERAL LOW BACK PAIN WITHOUT SCIATICA: ICD-10-CM

## 2021-05-12 DIAGNOSIS — M48.061 SPINAL STENOSIS OF LUMBAR REGION, UNSPECIFIED WHETHER NEUROGENIC CLAUDICATION PRESENT: ICD-10-CM

## 2021-05-12 PROCEDURE — 97110 THERAPEUTIC EXERCISES: CPT | Mod: PO

## 2021-05-12 RX ORDER — AMLODIPINE BESYLATE 10 MG/1
10 TABLET ORAL DAILY
Qty: 90 TABLET | Refills: 3 | Status: SHIPPED | OUTPATIENT
Start: 2021-05-12 | End: 2022-01-14 | Stop reason: SDUPTHER

## 2021-05-12 RX ORDER — SIMVASTATIN 40 MG/1
40 TABLET, FILM COATED ORAL NIGHTLY
Qty: 90 TABLET | Refills: 3 | Status: SHIPPED | OUTPATIENT
Start: 2021-05-12 | End: 2022-02-22

## 2021-05-12 RX ORDER — IRBESARTAN 300 MG/1
300 TABLET ORAL NIGHTLY
Qty: 90 TABLET | Refills: 3 | Status: SHIPPED | OUTPATIENT
Start: 2021-05-12 | End: 2022-01-14 | Stop reason: SDUPTHER

## 2021-05-12 RX ORDER — DULOXETIN HYDROCHLORIDE 30 MG/1
30 CAPSULE, DELAYED RELEASE ORAL DAILY
Qty: 90 CAPSULE | Refills: 3 | Status: SHIPPED | OUTPATIENT
Start: 2021-05-12 | End: 2022-02-09 | Stop reason: SDUPTHER

## 2021-05-17 ENCOUNTER — OFFICE VISIT (OUTPATIENT)
Dept: ENDOCRINOLOGY | Facility: CLINIC | Age: 71
End: 2021-05-17
Payer: MEDICARE

## 2021-05-17 VITALS
OXYGEN SATURATION: 97 % | DIASTOLIC BLOOD PRESSURE: 82 MMHG | RESPIRATION RATE: 20 BRPM | HEIGHT: 65 IN | BODY MASS INDEX: 48.82 KG/M2 | SYSTOLIC BLOOD PRESSURE: 150 MMHG | HEART RATE: 74 BPM | WEIGHT: 293 LBS

## 2021-05-17 DIAGNOSIS — E78.5 DYSLIPIDEMIA: Chronic | ICD-10-CM

## 2021-05-17 DIAGNOSIS — I12.9 TYPE 2 DM WITH CKD STAGE 4 AND HYPERTENSION: Primary | ICD-10-CM

## 2021-05-17 DIAGNOSIS — E11.22 TYPE 2 DM WITH CKD STAGE 4 AND HYPERTENSION: Primary | ICD-10-CM

## 2021-05-17 DIAGNOSIS — D63.1 ANEMIA IN STAGE 3B CHRONIC KIDNEY DISEASE: ICD-10-CM

## 2021-05-17 DIAGNOSIS — N18.32 ANEMIA IN STAGE 3B CHRONIC KIDNEY DISEASE: ICD-10-CM

## 2021-05-17 DIAGNOSIS — N18.4 TYPE 2 DM WITH CKD STAGE 4 AND HYPERTENSION: Primary | ICD-10-CM

## 2021-05-17 DIAGNOSIS — E66.01 MORBID OBESITY WITH BMI OF 50.0-59.9, ADULT: ICD-10-CM

## 2021-05-17 DIAGNOSIS — E11.649 HYPOGLYCEMIA UNAWARENESS ASSOCIATED WITH TYPE 2 DIABETES MELLITUS: Chronic | ICD-10-CM

## 2021-05-17 PROCEDURE — 3079F PR MOST RECENT DIASTOLIC BLOOD PRESSURE 80-89 MM HG: ICD-10-PCS | Mod: CPTII,S$GLB,, | Performed by: INTERNAL MEDICINE

## 2021-05-17 PROCEDURE — 3288F PR FALLS RISK ASSESSMENT DOCUMENTED: ICD-10-PCS | Mod: CPTII,S$GLB,, | Performed by: INTERNAL MEDICINE

## 2021-05-17 PROCEDURE — 1126F PR PAIN SEVERITY QUANTIFIED, NO PAIN PRESENT: ICD-10-PCS | Mod: S$GLB,,, | Performed by: INTERNAL MEDICINE

## 2021-05-17 PROCEDURE — 99999 PR PBB SHADOW E&M-EST. PATIENT-LVL V: ICD-10-PCS | Mod: PBBFAC,,, | Performed by: INTERNAL MEDICINE

## 2021-05-17 PROCEDURE — 3077F SYST BP >= 140 MM HG: CPT | Mod: CPTII,S$GLB,, | Performed by: INTERNAL MEDICINE

## 2021-05-17 PROCEDURE — 99499 UNLISTED E&M SERVICE: CPT | Mod: S$GLB,,, | Performed by: INTERNAL MEDICINE

## 2021-05-17 PROCEDURE — 3288F FALL RISK ASSESSMENT DOCD: CPT | Mod: CPTII,S$GLB,, | Performed by: INTERNAL MEDICINE

## 2021-05-17 PROCEDURE — 1159F MED LIST DOCD IN RCRD: CPT | Mod: S$GLB,,, | Performed by: INTERNAL MEDICINE

## 2021-05-17 PROCEDURE — 3079F DIAST BP 80-89 MM HG: CPT | Mod: CPTII,S$GLB,, | Performed by: INTERNAL MEDICINE

## 2021-05-17 PROCEDURE — 99499 RISK ADDL DX/OHS AUDIT: ICD-10-PCS | Mod: S$GLB,,, | Performed by: INTERNAL MEDICINE

## 2021-05-17 PROCEDURE — 99214 OFFICE O/P EST MOD 30 MIN: CPT | Mod: S$GLB,,, | Performed by: INTERNAL MEDICINE

## 2021-05-17 PROCEDURE — 1126F AMNT PAIN NOTED NONE PRSNT: CPT | Mod: S$GLB,,, | Performed by: INTERNAL MEDICINE

## 2021-05-17 PROCEDURE — 3077F PR MOST RECENT SYSTOLIC BLOOD PRESSURE >= 140 MM HG: ICD-10-PCS | Mod: CPTII,S$GLB,, | Performed by: INTERNAL MEDICINE

## 2021-05-17 PROCEDURE — 1101F PR PT FALLS ASSESS DOC 0-1 FALLS W/OUT INJ PAST YR: ICD-10-PCS | Mod: CPTII,S$GLB,, | Performed by: INTERNAL MEDICINE

## 2021-05-17 PROCEDURE — 1159F PR MEDICATION LIST DOCUMENTED IN MEDICAL RECORD: ICD-10-PCS | Mod: S$GLB,,, | Performed by: INTERNAL MEDICINE

## 2021-05-17 PROCEDURE — 1101F PT FALLS ASSESS-DOCD LE1/YR: CPT | Mod: CPTII,S$GLB,, | Performed by: INTERNAL MEDICINE

## 2021-05-17 PROCEDURE — 3044F HG A1C LEVEL LT 7.0%: CPT | Mod: CPTII,S$GLB,, | Performed by: INTERNAL MEDICINE

## 2021-05-17 PROCEDURE — 99214 PR OFFICE/OUTPT VISIT, EST, LEVL IV, 30-39 MIN: ICD-10-PCS | Mod: S$GLB,,, | Performed by: INTERNAL MEDICINE

## 2021-05-17 PROCEDURE — 3008F PR BODY MASS INDEX (BMI) DOCUMENTED: ICD-10-PCS | Mod: CPTII,S$GLB,, | Performed by: INTERNAL MEDICINE

## 2021-05-17 PROCEDURE — 3008F BODY MASS INDEX DOCD: CPT | Mod: CPTII,S$GLB,, | Performed by: INTERNAL MEDICINE

## 2021-05-17 PROCEDURE — 3044F PR MOST RECENT HEMOGLOBIN A1C LEVEL <7.0%: ICD-10-PCS | Mod: CPTII,S$GLB,, | Performed by: INTERNAL MEDICINE

## 2021-05-17 PROCEDURE — 99999 PR PBB SHADOW E&M-EST. PATIENT-LVL V: CPT | Mod: PBBFAC,,, | Performed by: INTERNAL MEDICINE

## 2021-05-18 ENCOUNTER — CLINICAL SUPPORT (OUTPATIENT)
Dept: REHABILITATION | Facility: HOSPITAL | Age: 71
End: 2021-05-18
Attending: STUDENT IN AN ORGANIZED HEALTH CARE EDUCATION/TRAINING PROGRAM
Payer: MEDICARE

## 2021-05-18 DIAGNOSIS — M62.81 MUSCLE WEAKNESS OF LOWER EXTREMITY: ICD-10-CM

## 2021-05-18 DIAGNOSIS — R26.2 DIFFICULTY WALKING: ICD-10-CM

## 2021-05-18 DIAGNOSIS — M54.50 CHRONIC BILATERAL LOW BACK PAIN WITHOUT SCIATICA: ICD-10-CM

## 2021-05-18 DIAGNOSIS — G89.29 CHRONIC BILATERAL LOW BACK PAIN WITHOUT SCIATICA: ICD-10-CM

## 2021-05-18 PROBLEM — M25.612 DECREASED ROM OF LEFT SHOULDER: Status: RESOLVED | Noted: 2020-06-30 | Resolved: 2021-05-18

## 2021-05-18 PROCEDURE — 97110 THERAPEUTIC EXERCISES: CPT | Mod: PO

## 2021-05-19 ENCOUNTER — PATIENT OUTREACH (OUTPATIENT)
Dept: ADMINISTRATIVE | Facility: OTHER | Age: 71
End: 2021-05-19

## 2021-05-20 ENCOUNTER — CLINICAL SUPPORT (OUTPATIENT)
Dept: REHABILITATION | Facility: HOSPITAL | Age: 71
End: 2021-05-20
Attending: STUDENT IN AN ORGANIZED HEALTH CARE EDUCATION/TRAINING PROGRAM
Payer: MEDICARE

## 2021-05-20 DIAGNOSIS — R26.2 DIFFICULTY WALKING: ICD-10-CM

## 2021-05-20 DIAGNOSIS — M62.81 MUSCLE WEAKNESS OF LOWER EXTREMITY: ICD-10-CM

## 2021-05-20 DIAGNOSIS — M54.50 CHRONIC BILATERAL LOW BACK PAIN WITHOUT SCIATICA: ICD-10-CM

## 2021-05-20 DIAGNOSIS — G89.29 CHRONIC BILATERAL LOW BACK PAIN WITHOUT SCIATICA: ICD-10-CM

## 2021-05-20 PROCEDURE — 97110 THERAPEUTIC EXERCISES: CPT | Mod: PO

## 2021-05-21 ENCOUNTER — OFFICE VISIT (OUTPATIENT)
Dept: PAIN MEDICINE | Facility: CLINIC | Age: 71
End: 2021-05-21
Payer: MEDICARE

## 2021-05-21 VITALS
BODY MASS INDEX: 52.42 KG/M2 | DIASTOLIC BLOOD PRESSURE: 70 MMHG | WEIGHT: 293 LBS | SYSTOLIC BLOOD PRESSURE: 112 MMHG | HEART RATE: 66 BPM

## 2021-05-21 DIAGNOSIS — G89.4 CHRONIC PAIN SYNDROME: ICD-10-CM

## 2021-05-21 DIAGNOSIS — M47.819 ARTHROPATHY OF FACET JOINTS AT MULTIPLE LEVELS: ICD-10-CM

## 2021-05-21 DIAGNOSIS — M54.42 CHRONIC BILATERAL LOW BACK PAIN WITH BILATERAL SCIATICA: ICD-10-CM

## 2021-05-21 DIAGNOSIS — M17.0 PRIMARY OSTEOARTHRITIS OF BOTH KNEES: ICD-10-CM

## 2021-05-21 DIAGNOSIS — M54.41 CHRONIC BILATERAL LOW BACK PAIN WITH BILATERAL SCIATICA: ICD-10-CM

## 2021-05-21 DIAGNOSIS — M47.816 LUMBAR SPONDYLOSIS: Primary | ICD-10-CM

## 2021-05-21 DIAGNOSIS — M51.36 DDD (DEGENERATIVE DISC DISEASE), LUMBAR: ICD-10-CM

## 2021-05-21 DIAGNOSIS — G89.29 CHRONIC BILATERAL LOW BACK PAIN WITH BILATERAL SCIATICA: ICD-10-CM

## 2021-05-21 PROCEDURE — 1125F AMNT PAIN NOTED PAIN PRSNT: CPT | Mod: S$GLB,,, | Performed by: NURSE PRACTITIONER

## 2021-05-21 PROCEDURE — 1125F PR PAIN SEVERITY QUANTIFIED, PAIN PRESENT: ICD-10-PCS | Mod: S$GLB,,, | Performed by: NURSE PRACTITIONER

## 2021-05-21 PROCEDURE — 1159F PR MEDICATION LIST DOCUMENTED IN MEDICAL RECORD: ICD-10-PCS | Mod: S$GLB,,, | Performed by: NURSE PRACTITIONER

## 2021-05-21 PROCEDURE — 3008F BODY MASS INDEX DOCD: CPT | Mod: CPTII,S$GLB,, | Performed by: NURSE PRACTITIONER

## 2021-05-21 PROCEDURE — 99213 OFFICE O/P EST LOW 20 MIN: CPT | Mod: S$GLB,,, | Performed by: NURSE PRACTITIONER

## 2021-05-21 PROCEDURE — 99213 PR OFFICE/OUTPT VISIT, EST, LEVL III, 20-29 MIN: ICD-10-PCS | Mod: S$GLB,,, | Performed by: NURSE PRACTITIONER

## 2021-05-21 PROCEDURE — 99999 PR PBB SHADOW E&M-EST. PATIENT-LVL IV: CPT | Mod: PBBFAC,,, | Performed by: NURSE PRACTITIONER

## 2021-05-21 PROCEDURE — 1159F MED LIST DOCD IN RCRD: CPT | Mod: S$GLB,,, | Performed by: NURSE PRACTITIONER

## 2021-05-21 PROCEDURE — 3008F PR BODY MASS INDEX (BMI) DOCUMENTED: ICD-10-PCS | Mod: CPTII,S$GLB,, | Performed by: NURSE PRACTITIONER

## 2021-05-21 PROCEDURE — 99999 PR PBB SHADOW E&M-EST. PATIENT-LVL IV: ICD-10-PCS | Mod: PBBFAC,,, | Performed by: NURSE PRACTITIONER

## 2021-05-24 ENCOUNTER — TELEPHONE (OUTPATIENT)
Dept: PAIN MEDICINE | Facility: CLINIC | Age: 71
End: 2021-05-24

## 2021-05-25 ENCOUNTER — CLINICAL SUPPORT (OUTPATIENT)
Dept: REHABILITATION | Facility: HOSPITAL | Age: 71
End: 2021-05-25
Attending: STUDENT IN AN ORGANIZED HEALTH CARE EDUCATION/TRAINING PROGRAM
Payer: MEDICARE

## 2021-05-25 ENCOUNTER — DOCUMENTATION ONLY (OUTPATIENT)
Dept: PODIATRY | Facility: CLINIC | Age: 71
End: 2021-05-25

## 2021-05-25 DIAGNOSIS — M62.81 MUSCLE WEAKNESS OF LOWER EXTREMITY: ICD-10-CM

## 2021-05-25 DIAGNOSIS — R26.2 DIFFICULTY WALKING: ICD-10-CM

## 2021-05-25 DIAGNOSIS — G89.29 CHRONIC BILATERAL LOW BACK PAIN WITHOUT SCIATICA: ICD-10-CM

## 2021-05-25 DIAGNOSIS — E08.43 DIABETIC AUTONOMIC NEUROPATHY ASSOCIATED WITH DIABETES MELLITUS DUE TO UNDERLYING CONDITION: Primary | ICD-10-CM

## 2021-05-25 DIAGNOSIS — M54.50 CHRONIC BILATERAL LOW BACK PAIN WITHOUT SCIATICA: ICD-10-CM

## 2021-05-25 PROCEDURE — 97110 THERAPEUTIC EXERCISES: CPT | Mod: PO

## 2021-05-25 RX ORDER — PREGABALIN 150 MG/1
150 CAPSULE ORAL 2 TIMES DAILY
Qty: 60 CAPSULE | Refills: 2 | Status: CANCELLED | OUTPATIENT
Start: 2021-05-25 | End: 2021-08-23

## 2021-05-25 RX ORDER — DICLOFENAC SODIUM 10 MG/G
2 GEL TOPICAL 4 TIMES DAILY
Qty: 1 TUBE | Refills: 2 | Status: CANCELLED | OUTPATIENT
Start: 2021-05-25

## 2021-05-26 DIAGNOSIS — E11.42 TYPE 2 DIABETES MELLITUS WITH DIABETIC POLYNEUROPATHY, WITH LONG-TERM CURRENT USE OF INSULIN: Primary | ICD-10-CM

## 2021-05-26 DIAGNOSIS — Z79.4 TYPE 2 DIABETES MELLITUS WITH DIABETIC POLYNEUROPATHY, WITH LONG-TERM CURRENT USE OF INSULIN: Primary | ICD-10-CM

## 2021-05-26 RX ORDER — SEMAGLUTIDE 1.34 MG/ML
1 INJECTION, SOLUTION SUBCUTANEOUS
Status: CANCELLED | OUTPATIENT
Start: 2021-05-26

## 2021-05-27 ENCOUNTER — CLINICAL SUPPORT (OUTPATIENT)
Dept: REHABILITATION | Facility: HOSPITAL | Age: 71
End: 2021-05-27
Attending: STUDENT IN AN ORGANIZED HEALTH CARE EDUCATION/TRAINING PROGRAM
Payer: MEDICARE

## 2021-05-27 DIAGNOSIS — M62.81 MUSCLE WEAKNESS OF LOWER EXTREMITY: ICD-10-CM

## 2021-05-27 DIAGNOSIS — M54.50 CHRONIC BILATERAL LOW BACK PAIN WITHOUT SCIATICA: ICD-10-CM

## 2021-05-27 DIAGNOSIS — G89.29 CHRONIC BILATERAL LOW BACK PAIN WITHOUT SCIATICA: ICD-10-CM

## 2021-05-27 DIAGNOSIS — R26.2 DIFFICULTY WALKING: ICD-10-CM

## 2021-05-27 PROCEDURE — 97110 THERAPEUTIC EXERCISES: CPT | Mod: PO

## 2021-06-02 ENCOUNTER — PATIENT MESSAGE (OUTPATIENT)
Dept: ENDOCRINOLOGY | Facility: CLINIC | Age: 71
End: 2021-06-02

## 2021-06-02 ENCOUNTER — TELEPHONE (OUTPATIENT)
Dept: PODIATRY | Facility: CLINIC | Age: 71
End: 2021-06-02

## 2021-06-02 ENCOUNTER — OFFICE VISIT (OUTPATIENT)
Dept: BARIATRICS | Facility: CLINIC | Age: 71
End: 2021-06-02
Payer: MEDICARE

## 2021-06-02 VITALS
DIASTOLIC BLOOD PRESSURE: 64 MMHG | WEIGHT: 293 LBS | BODY MASS INDEX: 48.82 KG/M2 | OXYGEN SATURATION: 98 % | HEART RATE: 74 BPM | SYSTOLIC BLOOD PRESSURE: 134 MMHG | HEIGHT: 65 IN

## 2021-06-02 DIAGNOSIS — E66.01 CLASS 3 SEVERE OBESITY DUE TO EXCESS CALORIES WITH SERIOUS COMORBIDITY AND BODY MASS INDEX (BMI) OF 50.0 TO 59.9 IN ADULT: Primary | ICD-10-CM

## 2021-06-02 PROCEDURE — 1126F AMNT PAIN NOTED NONE PRSNT: CPT | Mod: S$GLB,,, | Performed by: INTERNAL MEDICINE

## 2021-06-02 PROCEDURE — 99999 PR PBB SHADOW E&M-EST. PATIENT-LVL V: CPT | Mod: PBBFAC,,, | Performed by: INTERNAL MEDICINE

## 2021-06-02 PROCEDURE — 3008F BODY MASS INDEX DOCD: CPT | Mod: CPTII,S$GLB,, | Performed by: INTERNAL MEDICINE

## 2021-06-02 PROCEDURE — 1126F PR PAIN SEVERITY QUANTIFIED, NO PAIN PRESENT: ICD-10-PCS | Mod: S$GLB,,, | Performed by: INTERNAL MEDICINE

## 2021-06-02 PROCEDURE — 3288F FALL RISK ASSESSMENT DOCD: CPT | Mod: CPTII,S$GLB,, | Performed by: INTERNAL MEDICINE

## 2021-06-02 PROCEDURE — 1101F PR PT FALLS ASSESS DOC 0-1 FALLS W/OUT INJ PAST YR: ICD-10-PCS | Mod: CPTII,S$GLB,, | Performed by: INTERNAL MEDICINE

## 2021-06-02 PROCEDURE — 1101F PT FALLS ASSESS-DOCD LE1/YR: CPT | Mod: CPTII,S$GLB,, | Performed by: INTERNAL MEDICINE

## 2021-06-02 PROCEDURE — 3008F PR BODY MASS INDEX (BMI) DOCUMENTED: ICD-10-PCS | Mod: CPTII,S$GLB,, | Performed by: INTERNAL MEDICINE

## 2021-06-02 PROCEDURE — 99213 OFFICE O/P EST LOW 20 MIN: CPT | Mod: S$GLB,,, | Performed by: INTERNAL MEDICINE

## 2021-06-02 PROCEDURE — 99999 PR PBB SHADOW E&M-EST. PATIENT-LVL V: ICD-10-PCS | Mod: PBBFAC,,, | Performed by: INTERNAL MEDICINE

## 2021-06-02 PROCEDURE — 99213 PR OFFICE/OUTPT VISIT, EST, LEVL III, 20-29 MIN: ICD-10-PCS | Mod: S$GLB,,, | Performed by: INTERNAL MEDICINE

## 2021-06-02 PROCEDURE — 3288F PR FALLS RISK ASSESSMENT DOCUMENTED: ICD-10-PCS | Mod: CPTII,S$GLB,, | Performed by: INTERNAL MEDICINE

## 2021-06-02 PROCEDURE — 1159F PR MEDICATION LIST DOCUMENTED IN MEDICAL RECORD: ICD-10-PCS | Mod: S$GLB,,, | Performed by: INTERNAL MEDICINE

## 2021-06-02 PROCEDURE — 1159F MED LIST DOCD IN RCRD: CPT | Mod: S$GLB,,, | Performed by: INTERNAL MEDICINE

## 2021-06-02 RX ORDER — TOPIRAMATE 50 MG/1
50 TABLET, FILM COATED ORAL 2 TIMES DAILY
Qty: 180 TABLET | Refills: 0 | Status: SHIPPED | OUTPATIENT
Start: 2021-06-02 | End: 2022-01-14

## 2021-06-02 RX ORDER — PREGABALIN 150 MG/1
150 CAPSULE ORAL 2 TIMES DAILY
Qty: 60 CAPSULE | Refills: 6 | Status: SHIPPED | OUTPATIENT
Start: 2021-06-02 | End: 2021-07-08 | Stop reason: SDUPTHER

## 2021-06-03 ENCOUNTER — CLINICAL SUPPORT (OUTPATIENT)
Dept: REHABILITATION | Facility: HOSPITAL | Age: 71
End: 2021-06-03
Attending: STUDENT IN AN ORGANIZED HEALTH CARE EDUCATION/TRAINING PROGRAM
Payer: MEDICARE

## 2021-06-03 DIAGNOSIS — R26.2 DIFFICULTY WALKING: ICD-10-CM

## 2021-06-03 DIAGNOSIS — G89.29 CHRONIC BILATERAL LOW BACK PAIN WITHOUT SCIATICA: ICD-10-CM

## 2021-06-03 DIAGNOSIS — M62.81 MUSCLE WEAKNESS OF LOWER EXTREMITY: ICD-10-CM

## 2021-06-03 DIAGNOSIS — M54.50 CHRONIC BILATERAL LOW BACK PAIN WITHOUT SCIATICA: ICD-10-CM

## 2021-06-03 PROCEDURE — 97110 THERAPEUTIC EXERCISES: CPT | Mod: PO

## 2021-06-08 ENCOUNTER — CLINICAL SUPPORT (OUTPATIENT)
Dept: REHABILITATION | Facility: HOSPITAL | Age: 71
End: 2021-06-08
Attending: STUDENT IN AN ORGANIZED HEALTH CARE EDUCATION/TRAINING PROGRAM
Payer: MEDICARE

## 2021-06-08 DIAGNOSIS — M62.81 MUSCLE WEAKNESS OF LOWER EXTREMITY: ICD-10-CM

## 2021-06-08 DIAGNOSIS — R26.2 DIFFICULTY WALKING: ICD-10-CM

## 2021-06-08 DIAGNOSIS — G89.29 CHRONIC BILATERAL LOW BACK PAIN WITHOUT SCIATICA: ICD-10-CM

## 2021-06-08 DIAGNOSIS — M54.50 CHRONIC BILATERAL LOW BACK PAIN WITHOUT SCIATICA: ICD-10-CM

## 2021-06-08 PROCEDURE — 97164 PT RE-EVAL EST PLAN CARE: CPT | Mod: PO

## 2021-06-21 ENCOUNTER — TELEPHONE (OUTPATIENT)
Dept: ENDOCRINOLOGY | Facility: CLINIC | Age: 71
End: 2021-06-21

## 2021-06-21 ENCOUNTER — PATIENT MESSAGE (OUTPATIENT)
Dept: ENDOCRINOLOGY | Facility: CLINIC | Age: 71
End: 2021-06-21

## 2021-06-23 ENCOUNTER — OFFICE VISIT (OUTPATIENT)
Dept: HEMATOLOGY/ONCOLOGY | Facility: CLINIC | Age: 71
End: 2021-06-23
Payer: MEDICARE

## 2021-06-23 ENCOUNTER — LAB VISIT (OUTPATIENT)
Dept: LAB | Facility: HOSPITAL | Age: 71
End: 2021-06-23
Attending: INTERNAL MEDICINE
Payer: MEDICARE

## 2021-06-23 VITALS
DIASTOLIC BLOOD PRESSURE: 67 MMHG | OXYGEN SATURATION: 95 % | HEART RATE: 78 BPM | RESPIRATION RATE: 18 BRPM | HEIGHT: 65 IN | SYSTOLIC BLOOD PRESSURE: 144 MMHG | TEMPERATURE: 98 F | BODY MASS INDEX: 48.82 KG/M2 | WEIGHT: 293 LBS

## 2021-06-23 DIAGNOSIS — D63.1 ANEMIA OF RENAL DISEASE: Primary | ICD-10-CM

## 2021-06-23 DIAGNOSIS — E11.42 TYPE 2 DIABETES MELLITUS WITH DIABETIC POLYNEUROPATHY, WITH LONG-TERM CURRENT USE OF INSULIN: ICD-10-CM

## 2021-06-23 DIAGNOSIS — D63.1 ANEMIA IN STAGE 3B CHRONIC KIDNEY DISEASE: ICD-10-CM

## 2021-06-23 DIAGNOSIS — N18.32 ANEMIA IN STAGE 3B CHRONIC KIDNEY DISEASE: ICD-10-CM

## 2021-06-23 DIAGNOSIS — N18.9 ANEMIA OF RENAL DISEASE: Primary | ICD-10-CM

## 2021-06-23 DIAGNOSIS — Z79.4 TYPE 2 DIABETES MELLITUS WITH DIABETIC POLYNEUROPATHY, WITH LONG-TERM CURRENT USE OF INSULIN: ICD-10-CM

## 2021-06-23 LAB
ALBUMIN SERPL BCP-MCNC: 3.4 G/DL (ref 3.5–5.2)
ALP SERPL-CCNC: 131 U/L (ref 55–135)
ALT SERPL W/O P-5'-P-CCNC: 25 U/L (ref 10–44)
ANION GAP SERPL CALC-SCNC: 11 MMOL/L (ref 8–16)
AST SERPL-CCNC: 27 U/L (ref 10–40)
BILIRUB SERPL-MCNC: 0.3 MG/DL (ref 0.1–1)
BUN SERPL-MCNC: 26 MG/DL (ref 8–23)
CALCIUM SERPL-MCNC: 9.8 MG/DL (ref 8.7–10.5)
CHLORIDE SERPL-SCNC: 108 MMOL/L (ref 95–110)
CO2 SERPL-SCNC: 23 MMOL/L (ref 23–29)
CREAT SERPL-MCNC: 1.8 MG/DL (ref 0.5–1.4)
ERYTHROCYTE [DISTWIDTH] IN BLOOD BY AUTOMATED COUNT: 15.5 % (ref 11.5–14.5)
EST. GFR  (AFRICAN AMERICAN): 32.2 ML/MIN/1.73 M^2
EST. GFR  (NON AFRICAN AMERICAN): 27.9 ML/MIN/1.73 M^2
ESTIMATED AVG GLUCOSE: 143 MG/DL (ref 68–131)
FERRITIN SERPL-MCNC: 485 NG/ML (ref 20–300)
GLUCOSE SERPL-MCNC: 164 MG/DL (ref 70–110)
HBA1C MFR BLD: 6.6 % (ref 4–5.6)
HCT VFR BLD AUTO: 33 % (ref 37–48.5)
HGB BLD-MCNC: 10.1 G/DL (ref 12–16)
IMM GRANULOCYTES # BLD AUTO: 0.02 K/UL (ref 0–0.04)
MCH RBC QN AUTO: 28.5 PG (ref 27–31)
MCHC RBC AUTO-ENTMCNC: 30.6 G/DL (ref 32–36)
MCV RBC AUTO: 93 FL (ref 82–98)
NEUTROPHILS # BLD AUTO: 3.7 K/UL (ref 1.8–7.7)
PLATELET # BLD AUTO: 224 K/UL (ref 150–450)
PMV BLD AUTO: 11.2 FL (ref 9.2–12.9)
POTASSIUM SERPL-SCNC: 4.3 MMOL/L (ref 3.5–5.1)
PROT SERPL-MCNC: 6.3 G/DL (ref 6–8.4)
RBC # BLD AUTO: 3.54 M/UL (ref 4–5.4)
SODIUM SERPL-SCNC: 142 MMOL/L (ref 136–145)
WBC # BLD AUTO: 6.18 K/UL (ref 3.9–12.7)

## 2021-06-23 PROCEDURE — 3077F PR MOST RECENT SYSTOLIC BLOOD PRESSURE >= 140 MM HG: ICD-10-PCS | Mod: CPTII,S$GLB,, | Performed by: INTERNAL MEDICINE

## 2021-06-23 PROCEDURE — 99213 OFFICE O/P EST LOW 20 MIN: CPT | Mod: S$GLB,,, | Performed by: INTERNAL MEDICINE

## 2021-06-23 PROCEDURE — 1126F AMNT PAIN NOTED NONE PRSNT: CPT | Mod: S$GLB,,, | Performed by: INTERNAL MEDICINE

## 2021-06-23 PROCEDURE — 3008F BODY MASS INDEX DOCD: CPT | Mod: CPTII,S$GLB,, | Performed by: INTERNAL MEDICINE

## 2021-06-23 PROCEDURE — 3077F SYST BP >= 140 MM HG: CPT | Mod: CPTII,S$GLB,, | Performed by: INTERNAL MEDICINE

## 2021-06-23 PROCEDURE — 3078F DIAST BP <80 MM HG: CPT | Mod: CPTII,S$GLB,, | Performed by: INTERNAL MEDICINE

## 2021-06-23 PROCEDURE — 3288F FALL RISK ASSESSMENT DOCD: CPT | Mod: CPTII,S$GLB,, | Performed by: INTERNAL MEDICINE

## 2021-06-23 PROCEDURE — 3288F PR FALLS RISK ASSESSMENT DOCUMENTED: ICD-10-PCS | Mod: CPTII,S$GLB,, | Performed by: INTERNAL MEDICINE

## 2021-06-23 PROCEDURE — 99999 PR PBB SHADOW E&M-EST. PATIENT-LVL V: ICD-10-PCS | Mod: PBBFAC,,, | Performed by: INTERNAL MEDICINE

## 2021-06-23 PROCEDURE — 1126F PR PAIN SEVERITY QUANTIFIED, NO PAIN PRESENT: ICD-10-PCS | Mod: S$GLB,,, | Performed by: INTERNAL MEDICINE

## 2021-06-23 PROCEDURE — 99999 PR PBB SHADOW E&M-EST. PATIENT-LVL V: CPT | Mod: PBBFAC,,, | Performed by: INTERNAL MEDICINE

## 2021-06-23 PROCEDURE — 1101F PT FALLS ASSESS-DOCD LE1/YR: CPT | Mod: CPTII,S$GLB,, | Performed by: INTERNAL MEDICINE

## 2021-06-23 PROCEDURE — 3078F PR MOST RECENT DIASTOLIC BLOOD PRESSURE < 80 MM HG: ICD-10-PCS | Mod: CPTII,S$GLB,, | Performed by: INTERNAL MEDICINE

## 2021-06-23 PROCEDURE — 82728 ASSAY OF FERRITIN: CPT | Performed by: INTERNAL MEDICINE

## 2021-06-23 PROCEDURE — 1101F PR PT FALLS ASSESS DOC 0-1 FALLS W/OUT INJ PAST YR: ICD-10-PCS | Mod: CPTII,S$GLB,, | Performed by: INTERNAL MEDICINE

## 2021-06-23 PROCEDURE — 1159F MED LIST DOCD IN RCRD: CPT | Mod: S$GLB,,, | Performed by: INTERNAL MEDICINE

## 2021-06-23 PROCEDURE — 36415 COLL VENOUS BLD VENIPUNCTURE: CPT | Performed by: INTERNAL MEDICINE

## 2021-06-23 PROCEDURE — 85027 COMPLETE CBC AUTOMATED: CPT | Performed by: INTERNAL MEDICINE

## 2021-06-23 PROCEDURE — 1159F PR MEDICATION LIST DOCUMENTED IN MEDICAL RECORD: ICD-10-PCS | Mod: S$GLB,,, | Performed by: INTERNAL MEDICINE

## 2021-06-23 PROCEDURE — 83036 HEMOGLOBIN GLYCOSYLATED A1C: CPT | Performed by: INTERNAL MEDICINE

## 2021-06-23 PROCEDURE — 80053 COMPREHEN METABOLIC PANEL: CPT | Performed by: INTERNAL MEDICINE

## 2021-06-23 PROCEDURE — 3008F PR BODY MASS INDEX (BMI) DOCUMENTED: ICD-10-PCS | Mod: CPTII,S$GLB,, | Performed by: INTERNAL MEDICINE

## 2021-06-23 PROCEDURE — 99213 PR OFFICE/OUTPT VISIT, EST, LEVL III, 20-29 MIN: ICD-10-PCS | Mod: S$GLB,,, | Performed by: INTERNAL MEDICINE

## 2021-06-29 DIAGNOSIS — Z79.4 TYPE 2 DIABETES MELLITUS WITH DIABETIC POLYNEUROPATHY, WITH LONG-TERM CURRENT USE OF INSULIN: ICD-10-CM

## 2021-06-29 DIAGNOSIS — E11.42 TYPE 2 DIABETES MELLITUS WITH DIABETIC POLYNEUROPATHY, WITH LONG-TERM CURRENT USE OF INSULIN: ICD-10-CM

## 2021-07-01 ENCOUNTER — PATIENT MESSAGE (OUTPATIENT)
Dept: ADMINISTRATIVE | Facility: OTHER | Age: 71
End: 2021-07-01

## 2021-07-01 DIAGNOSIS — Z79.4 TYPE 2 DIABETES MELLITUS WITH DIABETIC POLYNEUROPATHY, WITH LONG-TERM CURRENT USE OF INSULIN: ICD-10-CM

## 2021-07-01 DIAGNOSIS — E11.42 TYPE 2 DIABETES MELLITUS WITH DIABETIC POLYNEUROPATHY, WITH LONG-TERM CURRENT USE OF INSULIN: ICD-10-CM

## 2021-07-08 ENCOUNTER — PES CALL (OUTPATIENT)
Dept: ADMINISTRATIVE | Facility: CLINIC | Age: 71
End: 2021-07-08

## 2021-07-08 ENCOUNTER — OFFICE VISIT (OUTPATIENT)
Dept: PODIATRY | Facility: CLINIC | Age: 71
End: 2021-07-08
Payer: MEDICARE

## 2021-07-08 VITALS
DIASTOLIC BLOOD PRESSURE: 76 MMHG | WEIGHT: 293 LBS | BODY MASS INDEX: 48.82 KG/M2 | HEART RATE: 82 BPM | SYSTOLIC BLOOD PRESSURE: 126 MMHG | HEIGHT: 65 IN

## 2021-07-08 DIAGNOSIS — B35.1 ONYCHOMYCOSIS DUE TO DERMATOPHYTE: ICD-10-CM

## 2021-07-08 DIAGNOSIS — E08.43 DIABETIC AUTONOMIC NEUROPATHY ASSOCIATED WITH DIABETES MELLITUS DUE TO UNDERLYING CONDITION: Primary | ICD-10-CM

## 2021-07-08 DIAGNOSIS — L84 CORN OR CALLUS: ICD-10-CM

## 2021-07-08 PROCEDURE — 11721 DEBRIDE NAIL 6 OR MORE: CPT | Mod: Q9,59,S$GLB, | Performed by: PODIATRIST

## 2021-07-08 PROCEDURE — 99999 PR PBB SHADOW E&M-EST. PATIENT-LVL V: CPT | Mod: PBBFAC,,, | Performed by: PODIATRIST

## 2021-07-08 PROCEDURE — 1159F PR MEDICATION LIST DOCUMENTED IN MEDICAL RECORD: ICD-10-PCS | Mod: S$GLB,,, | Performed by: PODIATRIST

## 2021-07-08 PROCEDURE — 99213 OFFICE O/P EST LOW 20 MIN: CPT | Mod: 25,S$GLB,, | Performed by: PODIATRIST

## 2021-07-08 PROCEDURE — 11056 PR TRIM BENIGN HYPERKERATOTIC SKIN LESION,2-4: ICD-10-PCS | Mod: Q9,S$GLB,, | Performed by: PODIATRIST

## 2021-07-08 PROCEDURE — 1159F MED LIST DOCD IN RCRD: CPT | Mod: S$GLB,,, | Performed by: PODIATRIST

## 2021-07-08 PROCEDURE — 3008F BODY MASS INDEX DOCD: CPT | Mod: CPTII,S$GLB,, | Performed by: PODIATRIST

## 2021-07-08 PROCEDURE — 99999 PR PBB SHADOW E&M-EST. PATIENT-LVL V: ICD-10-PCS | Mod: PBBFAC,,, | Performed by: PODIATRIST

## 2021-07-08 PROCEDURE — 99213 PR OFFICE/OUTPT VISIT, EST, LEVL III, 20-29 MIN: ICD-10-PCS | Mod: 25,S$GLB,, | Performed by: PODIATRIST

## 2021-07-08 PROCEDURE — 3008F PR BODY MASS INDEX (BMI) DOCUMENTED: ICD-10-PCS | Mod: CPTII,S$GLB,, | Performed by: PODIATRIST

## 2021-07-08 PROCEDURE — 11056 PARNG/CUTG B9 HYPRKR LES 2-4: CPT | Mod: Q9,S$GLB,, | Performed by: PODIATRIST

## 2021-07-08 PROCEDURE — 11721 PR DEBRIDEMENT OF NAILS, 6 OR MORE: ICD-10-PCS | Mod: Q9,59,S$GLB, | Performed by: PODIATRIST

## 2021-07-08 PROCEDURE — 1126F AMNT PAIN NOTED NONE PRSNT: CPT | Mod: S$GLB,,, | Performed by: PODIATRIST

## 2021-07-08 PROCEDURE — 1126F PR PAIN SEVERITY QUANTIFIED, NO PAIN PRESENT: ICD-10-PCS | Mod: S$GLB,,, | Performed by: PODIATRIST

## 2021-07-08 RX ORDER — KETOCONAZOLE 20 MG/G
CREAM TOPICAL DAILY
Qty: 1 TUBE | Refills: 2 | Status: SHIPPED | OUTPATIENT
Start: 2021-07-08 | End: 2022-01-14

## 2021-07-08 RX ORDER — PREGABALIN 150 MG/1
150 CAPSULE ORAL 2 TIMES DAILY
Qty: 60 CAPSULE | Refills: 6 | Status: SHIPPED | OUTPATIENT
Start: 2021-07-08 | End: 2022-01-06

## 2021-07-20 DIAGNOSIS — E11.42 TYPE 2 DIABETES MELLITUS WITH DIABETIC POLYNEUROPATHY, WITH LONG-TERM CURRENT USE OF INSULIN: ICD-10-CM

## 2021-07-20 DIAGNOSIS — Z79.4 TYPE 2 DIABETES MELLITUS WITH DIABETIC POLYNEUROPATHY, WITH LONG-TERM CURRENT USE OF INSULIN: ICD-10-CM

## 2021-07-22 DIAGNOSIS — Z79.4 TYPE 2 DIABETES MELLITUS WITH DIABETIC POLYNEUROPATHY, WITH LONG-TERM CURRENT USE OF INSULIN: ICD-10-CM

## 2021-07-22 DIAGNOSIS — E11.42 TYPE 2 DIABETES MELLITUS WITH DIABETIC POLYNEUROPATHY, WITH LONG-TERM CURRENT USE OF INSULIN: ICD-10-CM

## 2021-07-29 ENCOUNTER — PATIENT MESSAGE (OUTPATIENT)
Dept: ENDOCRINOLOGY | Facility: CLINIC | Age: 71
End: 2021-07-29

## 2021-07-29 ENCOUNTER — TELEPHONE (OUTPATIENT)
Dept: INTERNAL MEDICINE | Facility: CLINIC | Age: 71
End: 2021-07-29

## 2021-07-29 DIAGNOSIS — M51.36 DDD (DEGENERATIVE DISC DISEASE), LUMBAR: Primary | ICD-10-CM

## 2021-07-29 DIAGNOSIS — R26.2 DIFFICULTY WALKING: ICD-10-CM

## 2021-07-29 DIAGNOSIS — E11.22 TYPE 2 DM WITH CKD STAGE 4 AND HYPERTENSION: Primary | ICD-10-CM

## 2021-07-29 DIAGNOSIS — Z79.4 TYPE 2 DIABETES MELLITUS WITH DIABETIC POLYNEUROPATHY, WITH LONG-TERM CURRENT USE OF INSULIN: ICD-10-CM

## 2021-07-29 DIAGNOSIS — E11.42 TYPE 2 DIABETES MELLITUS WITH DIABETIC POLYNEUROPATHY, WITH LONG-TERM CURRENT USE OF INSULIN: ICD-10-CM

## 2021-07-29 DIAGNOSIS — I12.9 TYPE 2 DM WITH CKD STAGE 4 AND HYPERTENSION: Primary | ICD-10-CM

## 2021-07-29 DIAGNOSIS — N18.4 TYPE 2 DM WITH CKD STAGE 4 AND HYPERTENSION: Primary | ICD-10-CM

## 2021-07-30 ENCOUNTER — TELEPHONE (OUTPATIENT)
Dept: ENDOCRINOLOGY | Facility: CLINIC | Age: 71
End: 2021-07-30

## 2021-07-30 DIAGNOSIS — I12.9 TYPE 2 DM WITH CKD STAGE 4 AND HYPERTENSION: ICD-10-CM

## 2021-07-30 DIAGNOSIS — E11.22 TYPE 2 DM WITH CKD STAGE 4 AND HYPERTENSION: ICD-10-CM

## 2021-07-30 DIAGNOSIS — N18.4 TYPE 2 DM WITH CKD STAGE 4 AND HYPERTENSION: ICD-10-CM

## 2021-08-03 ENCOUNTER — PATIENT OUTREACH (OUTPATIENT)
Dept: ADMINISTRATIVE | Facility: OTHER | Age: 71
End: 2021-08-03

## 2021-08-05 ENCOUNTER — OFFICE VISIT (OUTPATIENT)
Dept: PODIATRY | Facility: CLINIC | Age: 71
End: 2021-08-05
Payer: MEDICARE

## 2021-08-05 VITALS
HEART RATE: 86 BPM | WEIGHT: 293 LBS | DIASTOLIC BLOOD PRESSURE: 61 MMHG | BODY MASS INDEX: 52.17 KG/M2 | SYSTOLIC BLOOD PRESSURE: 112 MMHG

## 2021-08-05 DIAGNOSIS — L60.0 INGROWN NAIL: Primary | ICD-10-CM

## 2021-08-05 DIAGNOSIS — G57.53 TARSAL TUNNEL SYNDROME, BILATERAL: ICD-10-CM

## 2021-08-05 PROCEDURE — 99213 OFFICE O/P EST LOW 20 MIN: CPT | Mod: 25,S$GLB,, | Performed by: PODIATRIST

## 2021-08-05 PROCEDURE — 1160F PR REVIEW ALL MEDS BY PRESCRIBER/CLIN PHARMACIST DOCUMENTED: ICD-10-PCS | Mod: CPTII,S$GLB,, | Performed by: PODIATRIST

## 2021-08-05 PROCEDURE — 11730 PR REMOVAL OF NAIL PLATE: ICD-10-PCS | Mod: T1,S$GLB,, | Performed by: PODIATRIST

## 2021-08-05 PROCEDURE — 99999 PR PBB SHADOW E&M-EST. PATIENT-LVL V: ICD-10-PCS | Mod: PBBFAC,,, | Performed by: PODIATRIST

## 2021-08-05 PROCEDURE — 1126F PR PAIN SEVERITY QUANTIFIED, NO PAIN PRESENT: ICD-10-PCS | Mod: CPTII,S$GLB,, | Performed by: PODIATRIST

## 2021-08-05 PROCEDURE — 99213 PR OFFICE/OUTPT VISIT, EST, LEVL III, 20-29 MIN: ICD-10-PCS | Mod: 25,S$GLB,, | Performed by: PODIATRIST

## 2021-08-05 PROCEDURE — 3008F BODY MASS INDEX DOCD: CPT | Mod: CPTII,S$GLB,, | Performed by: PODIATRIST

## 2021-08-05 PROCEDURE — 3044F HG A1C LEVEL LT 7.0%: CPT | Mod: CPTII,S$GLB,, | Performed by: PODIATRIST

## 2021-08-05 PROCEDURE — 1160F RVW MEDS BY RX/DR IN RCRD: CPT | Mod: CPTII,S$GLB,, | Performed by: PODIATRIST

## 2021-08-05 PROCEDURE — 1159F PR MEDICATION LIST DOCUMENTED IN MEDICAL RECORD: ICD-10-PCS | Mod: CPTII,S$GLB,, | Performed by: PODIATRIST

## 2021-08-05 PROCEDURE — 11732 AVLSN NAIL PLATE SIMPLE EACH: CPT | Mod: T6,S$GLB,, | Performed by: PODIATRIST

## 2021-08-05 PROCEDURE — 3044F PR MOST RECENT HEMOGLOBIN A1C LEVEL <7.0%: ICD-10-PCS | Mod: CPTII,S$GLB,, | Performed by: PODIATRIST

## 2021-08-05 PROCEDURE — 3008F PR BODY MASS INDEX (BMI) DOCUMENTED: ICD-10-PCS | Mod: CPTII,S$GLB,, | Performed by: PODIATRIST

## 2021-08-05 PROCEDURE — 1159F MED LIST DOCD IN RCRD: CPT | Mod: CPTII,S$GLB,, | Performed by: PODIATRIST

## 2021-08-05 PROCEDURE — 3074F PR MOST RECENT SYSTOLIC BLOOD PRESSURE < 130 MM HG: ICD-10-PCS | Mod: CPTII,S$GLB,, | Performed by: PODIATRIST

## 2021-08-05 PROCEDURE — 1126F AMNT PAIN NOTED NONE PRSNT: CPT | Mod: CPTII,S$GLB,, | Performed by: PODIATRIST

## 2021-08-05 PROCEDURE — 11732 PR REMOVE, NAIL PLATE, EA ADDTL: ICD-10-PCS | Mod: T6,S$GLB,, | Performed by: PODIATRIST

## 2021-08-05 PROCEDURE — 3078F PR MOST RECENT DIASTOLIC BLOOD PRESSURE < 80 MM HG: ICD-10-PCS | Mod: CPTII,S$GLB,, | Performed by: PODIATRIST

## 2021-08-05 PROCEDURE — 64450 PR NERVE BLOCK INJ, ANES/STEROID, OTHER PERIPHERAL: ICD-10-PCS | Mod: 59,50,S$GLB, | Performed by: PODIATRIST

## 2021-08-05 PROCEDURE — 3078F DIAST BP <80 MM HG: CPT | Mod: CPTII,S$GLB,, | Performed by: PODIATRIST

## 2021-08-05 PROCEDURE — 99999 PR PBB SHADOW E&M-EST. PATIENT-LVL V: CPT | Mod: PBBFAC,,, | Performed by: PODIATRIST

## 2021-08-05 PROCEDURE — 11730 AVULSION NAIL PLATE SIMPLE 1: CPT | Mod: T1,S$GLB,, | Performed by: PODIATRIST

## 2021-08-05 PROCEDURE — 3074F SYST BP LT 130 MM HG: CPT | Mod: CPTII,S$GLB,, | Performed by: PODIATRIST

## 2021-08-05 PROCEDURE — 64450 NJX AA&/STRD OTHER PN/BRANCH: CPT | Mod: 59,50,S$GLB, | Performed by: PODIATRIST

## 2021-08-10 DIAGNOSIS — N18.4 TYPE 2 DM WITH CKD STAGE 4 AND HYPERTENSION: ICD-10-CM

## 2021-08-10 DIAGNOSIS — I12.9 TYPE 2 DM WITH CKD STAGE 4 AND HYPERTENSION: ICD-10-CM

## 2021-08-10 DIAGNOSIS — E11.22 TYPE 2 DM WITH CKD STAGE 4 AND HYPERTENSION: ICD-10-CM

## 2021-08-13 ENCOUNTER — OFFICE VISIT (OUTPATIENT)
Dept: PODIATRY | Facility: CLINIC | Age: 71
End: 2021-08-13
Payer: MEDICARE

## 2021-08-13 ENCOUNTER — TELEPHONE (OUTPATIENT)
Dept: PODIATRY | Facility: CLINIC | Age: 71
End: 2021-08-13

## 2021-08-13 VITALS
SYSTOLIC BLOOD PRESSURE: 126 MMHG | DIASTOLIC BLOOD PRESSURE: 61 MMHG | BODY MASS INDEX: 52.17 KG/M2 | WEIGHT: 293 LBS | HEART RATE: 89 BPM

## 2021-08-13 DIAGNOSIS — L60.0 INGROWN NAIL: Primary | ICD-10-CM

## 2021-08-13 PROCEDURE — 3078F PR MOST RECENT DIASTOLIC BLOOD PRESSURE < 80 MM HG: ICD-10-PCS | Mod: CPTII,S$GLB,, | Performed by: PODIATRIST

## 2021-08-13 PROCEDURE — 3044F PR MOST RECENT HEMOGLOBIN A1C LEVEL <7.0%: ICD-10-PCS | Mod: CPTII,S$GLB,, | Performed by: PODIATRIST

## 2021-08-13 PROCEDURE — 3008F PR BODY MASS INDEX (BMI) DOCUMENTED: ICD-10-PCS | Mod: CPTII,S$GLB,, | Performed by: PODIATRIST

## 2021-08-13 PROCEDURE — 1159F PR MEDICATION LIST DOCUMENTED IN MEDICAL RECORD: ICD-10-PCS | Mod: CPTII,S$GLB,, | Performed by: PODIATRIST

## 2021-08-13 PROCEDURE — 99999 PR PBB SHADOW E&M-EST. PATIENT-LVL IV: CPT | Mod: PBBFAC,,, | Performed by: PODIATRIST

## 2021-08-13 PROCEDURE — 1159F MED LIST DOCD IN RCRD: CPT | Mod: CPTII,S$GLB,, | Performed by: PODIATRIST

## 2021-08-13 PROCEDURE — 99999 PR PBB SHADOW E&M-EST. PATIENT-LVL IV: ICD-10-PCS | Mod: PBBFAC,,, | Performed by: PODIATRIST

## 2021-08-13 PROCEDURE — 1125F PR PAIN SEVERITY QUANTIFIED, PAIN PRESENT: ICD-10-PCS | Mod: CPTII,S$GLB,, | Performed by: PODIATRIST

## 2021-08-13 PROCEDURE — 3078F DIAST BP <80 MM HG: CPT | Mod: CPTII,S$GLB,, | Performed by: PODIATRIST

## 2021-08-13 PROCEDURE — 99024 POSTOP FOLLOW-UP VISIT: CPT | Mod: S$GLB,,, | Performed by: PODIATRIST

## 2021-08-13 PROCEDURE — 1125F AMNT PAIN NOTED PAIN PRSNT: CPT | Mod: CPTII,S$GLB,, | Performed by: PODIATRIST

## 2021-08-13 PROCEDURE — 3008F BODY MASS INDEX DOCD: CPT | Mod: CPTII,S$GLB,, | Performed by: PODIATRIST

## 2021-08-13 PROCEDURE — 3044F HG A1C LEVEL LT 7.0%: CPT | Mod: CPTII,S$GLB,, | Performed by: PODIATRIST

## 2021-08-13 PROCEDURE — 1160F RVW MEDS BY RX/DR IN RCRD: CPT | Mod: CPTII,S$GLB,, | Performed by: PODIATRIST

## 2021-08-13 PROCEDURE — 3074F PR MOST RECENT SYSTOLIC BLOOD PRESSURE < 130 MM HG: ICD-10-PCS | Mod: CPTII,S$GLB,, | Performed by: PODIATRIST

## 2021-08-13 PROCEDURE — 99024 PR POST-OP FOLLOW-UP VISIT: ICD-10-PCS | Mod: S$GLB,,, | Performed by: PODIATRIST

## 2021-08-13 PROCEDURE — 1160F PR REVIEW ALL MEDS BY PRESCRIBER/CLIN PHARMACIST DOCUMENTED: ICD-10-PCS | Mod: CPTII,S$GLB,, | Performed by: PODIATRIST

## 2021-08-13 PROCEDURE — 3074F SYST BP LT 130 MM HG: CPT | Mod: CPTII,S$GLB,, | Performed by: PODIATRIST

## 2021-08-13 RX ORDER — HYDROCODONE BITARTRATE AND ACETAMINOPHEN 7.5; 325 MG/1; MG/1
1 TABLET ORAL EVERY 6 HOURS PRN
Qty: 28 TABLET | Refills: 0 | Status: SHIPPED | OUTPATIENT
Start: 2021-08-13 | End: 2021-08-26 | Stop reason: SDUPTHER

## 2021-08-13 RX ORDER — AMOXICILLIN AND CLAVULANATE POTASSIUM 500; 125 MG/1; MG/1
1 TABLET, FILM COATED ORAL 2 TIMES DAILY
Qty: 14 TABLET | Refills: 0 | Status: SHIPPED | OUTPATIENT
Start: 2021-08-13 | End: 2021-08-16

## 2021-08-16 ENCOUNTER — TELEPHONE (OUTPATIENT)
Dept: PODIATRY | Facility: CLINIC | Age: 71
End: 2021-08-16

## 2021-08-16 ENCOUNTER — TELEPHONE (OUTPATIENT)
Dept: HEPATOLOGY | Facility: HOSPITAL | Age: 71
End: 2021-08-16

## 2021-08-16 DIAGNOSIS — E11.22 TYPE 2 DM WITH CKD STAGE 4 AND HYPERTENSION: Primary | ICD-10-CM

## 2021-08-16 DIAGNOSIS — I12.9 TYPE 2 DM WITH CKD STAGE 4 AND HYPERTENSION: Primary | ICD-10-CM

## 2021-08-16 DIAGNOSIS — N18.4 TYPE 2 DM WITH CKD STAGE 4 AND HYPERTENSION: Primary | ICD-10-CM

## 2021-08-16 RX ORDER — CLINDAMYCIN HYDROCHLORIDE 300 MG/1
300 CAPSULE ORAL 3 TIMES DAILY
Qty: 30 CAPSULE | Refills: 0 | Status: SHIPPED | OUTPATIENT
Start: 2021-08-16 | End: 2021-08-26

## 2021-08-16 RX ORDER — INSULIN HUMAN 100 [IU]/ML
INJECTION, SUSPENSION SUBCUTANEOUS
Qty: 3 SYRINGE | Refills: 3 | Status: SHIPPED | OUTPATIENT
Start: 2021-08-16 | End: 2021-09-13 | Stop reason: SDUPTHER

## 2021-08-24 ENCOUNTER — TELEPHONE (OUTPATIENT)
Dept: SLEEP MEDICINE | Facility: CLINIC | Age: 71
End: 2021-08-24

## 2021-08-25 ENCOUNTER — TELEPHONE (OUTPATIENT)
Dept: SLEEP MEDICINE | Facility: CLINIC | Age: 71
End: 2021-08-25

## 2021-08-26 ENCOUNTER — TELEPHONE (OUTPATIENT)
Dept: PODIATRY | Facility: CLINIC | Age: 71
End: 2021-08-26

## 2021-08-26 ENCOUNTER — OFFICE VISIT (OUTPATIENT)
Dept: PODIATRY | Facility: CLINIC | Age: 71
End: 2021-08-26
Payer: MEDICARE

## 2021-08-26 VITALS
SYSTOLIC BLOOD PRESSURE: 116 MMHG | BODY MASS INDEX: 52.17 KG/M2 | DIASTOLIC BLOOD PRESSURE: 72 MMHG | HEIGHT: 65 IN | HEART RATE: 84 BPM

## 2021-08-26 DIAGNOSIS — E08.43 DIABETIC AUTONOMIC NEUROPATHY ASSOCIATED WITH DIABETES MELLITUS DUE TO UNDERLYING CONDITION: Primary | ICD-10-CM

## 2021-08-26 DIAGNOSIS — L97.522 ULCER OF TOE, LEFT, WITH FAT LAYER EXPOSED: ICD-10-CM

## 2021-08-26 PROCEDURE — 4010F ACE/ARB THERAPY RXD/TAKEN: CPT | Mod: CPTII,S$GLB,, | Performed by: PODIATRIST

## 2021-08-26 PROCEDURE — 3066F PR DOCUMENTATION OF TREATMENT FOR NEPHROPATHY: ICD-10-PCS | Mod: CPTII,S$GLB,, | Performed by: PODIATRIST

## 2021-08-26 PROCEDURE — 1160F RVW MEDS BY RX/DR IN RCRD: CPT | Mod: CPTII,S$GLB,, | Performed by: PODIATRIST

## 2021-08-26 PROCEDURE — 3044F PR MOST RECENT HEMOGLOBIN A1C LEVEL <7.0%: ICD-10-PCS | Mod: CPTII,S$GLB,, | Performed by: PODIATRIST

## 2021-08-26 PROCEDURE — 11042 DBRDMT SUBQ TIS 1ST 20SQCM/<: CPT | Mod: S$GLB,,, | Performed by: PODIATRIST

## 2021-08-26 PROCEDURE — 99999 PR PBB SHADOW E&M-EST. PATIENT-LVL IV: CPT | Mod: PBBFAC,,, | Performed by: PODIATRIST

## 2021-08-26 PROCEDURE — 3074F PR MOST RECENT SYSTOLIC BLOOD PRESSURE < 130 MM HG: ICD-10-PCS | Mod: CPTII,S$GLB,, | Performed by: PODIATRIST

## 2021-08-26 PROCEDURE — 1125F AMNT PAIN NOTED PAIN PRSNT: CPT | Mod: CPTII,S$GLB,, | Performed by: PODIATRIST

## 2021-08-26 PROCEDURE — 3061F PR NEG MICROALBUMINURIA RESULT DOCUMENTED/REVIEW: ICD-10-PCS | Mod: CPTII,S$GLB,, | Performed by: PODIATRIST

## 2021-08-26 PROCEDURE — 1160F PR REVIEW ALL MEDS BY PRESCRIBER/CLIN PHARMACIST DOCUMENTED: ICD-10-PCS | Mod: CPTII,S$GLB,, | Performed by: PODIATRIST

## 2021-08-26 PROCEDURE — 99213 OFFICE O/P EST LOW 20 MIN: CPT | Mod: 25,S$GLB,, | Performed by: PODIATRIST

## 2021-08-26 PROCEDURE — 3078F DIAST BP <80 MM HG: CPT | Mod: CPTII,S$GLB,, | Performed by: PODIATRIST

## 2021-08-26 PROCEDURE — 1125F PR PAIN SEVERITY QUANTIFIED, PAIN PRESENT: ICD-10-PCS | Mod: CPTII,S$GLB,, | Performed by: PODIATRIST

## 2021-08-26 PROCEDURE — 3066F NEPHROPATHY DOC TX: CPT | Mod: CPTII,S$GLB,, | Performed by: PODIATRIST

## 2021-08-26 PROCEDURE — 99213 PR OFFICE/OUTPT VISIT, EST, LEVL III, 20-29 MIN: ICD-10-PCS | Mod: 25,S$GLB,, | Performed by: PODIATRIST

## 2021-08-26 PROCEDURE — 3061F NEG MICROALBUMINURIA REV: CPT | Mod: CPTII,S$GLB,, | Performed by: PODIATRIST

## 2021-08-26 PROCEDURE — 1159F PR MEDICATION LIST DOCUMENTED IN MEDICAL RECORD: ICD-10-PCS | Mod: CPTII,S$GLB,, | Performed by: PODIATRIST

## 2021-08-26 PROCEDURE — 3008F BODY MASS INDEX DOCD: CPT | Mod: CPTII,S$GLB,, | Performed by: PODIATRIST

## 2021-08-26 PROCEDURE — 3078F PR MOST RECENT DIASTOLIC BLOOD PRESSURE < 80 MM HG: ICD-10-PCS | Mod: CPTII,S$GLB,, | Performed by: PODIATRIST

## 2021-08-26 PROCEDURE — 3288F PR FALLS RISK ASSESSMENT DOCUMENTED: ICD-10-PCS | Mod: CPTII,S$GLB,, | Performed by: PODIATRIST

## 2021-08-26 PROCEDURE — 1159F MED LIST DOCD IN RCRD: CPT | Mod: CPTII,S$GLB,, | Performed by: PODIATRIST

## 2021-08-26 PROCEDURE — 3008F PR BODY MASS INDEX (BMI) DOCUMENTED: ICD-10-PCS | Mod: CPTII,S$GLB,, | Performed by: PODIATRIST

## 2021-08-26 PROCEDURE — 3044F HG A1C LEVEL LT 7.0%: CPT | Mod: CPTII,S$GLB,, | Performed by: PODIATRIST

## 2021-08-26 PROCEDURE — 3288F FALL RISK ASSESSMENT DOCD: CPT | Mod: CPTII,S$GLB,, | Performed by: PODIATRIST

## 2021-08-26 PROCEDURE — 1101F PT FALLS ASSESS-DOCD LE1/YR: CPT | Mod: CPTII,S$GLB,, | Performed by: PODIATRIST

## 2021-08-26 PROCEDURE — 99999 PR PBB SHADOW E&M-EST. PATIENT-LVL IV: ICD-10-PCS | Mod: PBBFAC,,, | Performed by: PODIATRIST

## 2021-08-26 PROCEDURE — 11042 PR DEBRIDEMENT, SKIN, SUB-Q TISSUE,=<20 SQ CM: ICD-10-PCS | Mod: S$GLB,,, | Performed by: PODIATRIST

## 2021-08-26 PROCEDURE — 4010F PR ACE/ARB THEARPY RXD/TAKEN: ICD-10-PCS | Mod: CPTII,S$GLB,, | Performed by: PODIATRIST

## 2021-08-26 PROCEDURE — 3074F SYST BP LT 130 MM HG: CPT | Mod: CPTII,S$GLB,, | Performed by: PODIATRIST

## 2021-08-26 PROCEDURE — 1101F PR PT FALLS ASSESS DOC 0-1 FALLS W/OUT INJ PAST YR: ICD-10-PCS | Mod: CPTII,S$GLB,, | Performed by: PODIATRIST

## 2021-08-26 RX ORDER — HYDROCODONE BITARTRATE AND ACETAMINOPHEN 7.5; 325 MG/1; MG/1
1 TABLET ORAL EVERY 6 HOURS PRN
Qty: 28 TABLET | Refills: 0 | Status: SHIPPED | OUTPATIENT
Start: 2021-08-26 | End: 2022-01-21

## 2021-08-26 RX ORDER — ALLOPURINOL 300 MG/1
300 TABLET ORAL DAILY
COMMUNITY
Start: 2021-06-24 | End: 2022-03-22

## 2021-08-31 ENCOUNTER — NURSE TRIAGE (OUTPATIENT)
Dept: ADMINISTRATIVE | Facility: CLINIC | Age: 71
End: 2021-08-31

## 2021-09-05 ENCOUNTER — TELEPHONE (OUTPATIENT)
Dept: PODIATRY | Facility: CLINIC | Age: 71
End: 2021-09-05

## 2021-09-08 ENCOUNTER — OFFICE VISIT (OUTPATIENT)
Dept: PODIATRY | Facility: CLINIC | Age: 71
End: 2021-09-08
Payer: MEDICARE

## 2021-09-08 VITALS — HEART RATE: 83 BPM | TEMPERATURE: 98 F | SYSTOLIC BLOOD PRESSURE: 171 MMHG | DIASTOLIC BLOOD PRESSURE: 77 MMHG

## 2021-09-08 DIAGNOSIS — E08.43 DIABETIC AUTONOMIC NEUROPATHY ASSOCIATED WITH DIABETES MELLITUS DUE TO UNDERLYING CONDITION: ICD-10-CM

## 2021-09-08 DIAGNOSIS — L97.522 ULCER OF TOE, LEFT, WITH FAT LAYER EXPOSED: Primary | ICD-10-CM

## 2021-09-08 PROCEDURE — 4010F ACE/ARB THERAPY RXD/TAKEN: CPT | Mod: CPTII,S$GLB,, | Performed by: PODIATRIST

## 2021-09-08 PROCEDURE — 99999 PR PBB SHADOW E&M-EST. PATIENT-LVL V: CPT | Mod: PBBFAC,,, | Performed by: PODIATRIST

## 2021-09-08 PROCEDURE — 3044F HG A1C LEVEL LT 7.0%: CPT | Mod: CPTII,S$GLB,, | Performed by: PODIATRIST

## 2021-09-08 PROCEDURE — 1160F RVW MEDS BY RX/DR IN RCRD: CPT | Mod: CPTII,S$GLB,, | Performed by: PODIATRIST

## 2021-09-08 PROCEDURE — 1159F MED LIST DOCD IN RCRD: CPT | Mod: CPTII,S$GLB,, | Performed by: PODIATRIST

## 2021-09-08 PROCEDURE — 1125F PR PAIN SEVERITY QUANTIFIED, PAIN PRESENT: ICD-10-PCS | Mod: CPTII,S$GLB,, | Performed by: PODIATRIST

## 2021-09-08 PROCEDURE — 99999 PR PBB SHADOW E&M-EST. PATIENT-LVL V: ICD-10-PCS | Mod: PBBFAC,,, | Performed by: PODIATRIST

## 2021-09-08 PROCEDURE — 3044F PR MOST RECENT HEMOGLOBIN A1C LEVEL <7.0%: ICD-10-PCS | Mod: CPTII,S$GLB,, | Performed by: PODIATRIST

## 2021-09-08 PROCEDURE — 3066F NEPHROPATHY DOC TX: CPT | Mod: CPTII,S$GLB,, | Performed by: PODIATRIST

## 2021-09-08 PROCEDURE — 3066F PR DOCUMENTATION OF TREATMENT FOR NEPHROPATHY: ICD-10-PCS | Mod: CPTII,S$GLB,, | Performed by: PODIATRIST

## 2021-09-08 PROCEDURE — 3078F PR MOST RECENT DIASTOLIC BLOOD PRESSURE < 80 MM HG: ICD-10-PCS | Mod: CPTII,S$GLB,, | Performed by: PODIATRIST

## 2021-09-08 PROCEDURE — 3077F PR MOST RECENT SYSTOLIC BLOOD PRESSURE >= 140 MM HG: ICD-10-PCS | Mod: CPTII,S$GLB,, | Performed by: PODIATRIST

## 2021-09-08 PROCEDURE — 11043 PR DEBRIDEMENT, SKIN, SUB-Q TISSUE,MUSCLE,=<20 SQ CM: ICD-10-PCS | Mod: S$GLB,,, | Performed by: PODIATRIST

## 2021-09-08 PROCEDURE — 4010F PR ACE/ARB THEARPY RXD/TAKEN: ICD-10-PCS | Mod: CPTII,S$GLB,, | Performed by: PODIATRIST

## 2021-09-08 PROCEDURE — 11043 DBRDMT MUSC&/FSCA 1ST 20/<: CPT | Mod: S$GLB,,, | Performed by: PODIATRIST

## 2021-09-08 PROCEDURE — 1160F PR REVIEW ALL MEDS BY PRESCRIBER/CLIN PHARMACIST DOCUMENTED: ICD-10-PCS | Mod: CPTII,S$GLB,, | Performed by: PODIATRIST

## 2021-09-08 PROCEDURE — 3061F PR NEG MICROALBUMINURIA RESULT DOCUMENTED/REVIEW: ICD-10-PCS | Mod: CPTII,S$GLB,, | Performed by: PODIATRIST

## 2021-09-08 PROCEDURE — 3078F DIAST BP <80 MM HG: CPT | Mod: CPTII,S$GLB,, | Performed by: PODIATRIST

## 2021-09-08 PROCEDURE — 3061F NEG MICROALBUMINURIA REV: CPT | Mod: CPTII,S$GLB,, | Performed by: PODIATRIST

## 2021-09-08 PROCEDURE — 99499 UNLISTED E&M SERVICE: CPT | Mod: S$GLB,,, | Performed by: PODIATRIST

## 2021-09-08 PROCEDURE — 1125F AMNT PAIN NOTED PAIN PRSNT: CPT | Mod: CPTII,S$GLB,, | Performed by: PODIATRIST

## 2021-09-08 PROCEDURE — 1159F PR MEDICATION LIST DOCUMENTED IN MEDICAL RECORD: ICD-10-PCS | Mod: CPTII,S$GLB,, | Performed by: PODIATRIST

## 2021-09-08 PROCEDURE — 3077F SYST BP >= 140 MM HG: CPT | Mod: CPTII,S$GLB,, | Performed by: PODIATRIST

## 2021-09-08 PROCEDURE — 99499 NO LOS: ICD-10-PCS | Mod: S$GLB,,, | Performed by: PODIATRIST

## 2021-09-08 RX ORDER — SEMAGLUTIDE 1.34 MG/ML
INJECTION, SOLUTION SUBCUTANEOUS
COMMUNITY
Start: 2021-05-26 | End: 2022-01-21

## 2021-09-09 RX ORDER — PREGABALIN 150 MG/1
150 CAPSULE ORAL 2 TIMES DAILY
Qty: 60 CAPSULE | Refills: 6 | Status: SHIPPED | OUTPATIENT
Start: 2021-09-09 | End: 2022-01-21 | Stop reason: SDUPTHER

## 2021-09-10 ENCOUNTER — TELEPHONE (OUTPATIENT)
Dept: ENDOCRINOLOGY | Facility: CLINIC | Age: 71
End: 2021-09-10

## 2021-09-10 ENCOUNTER — TELEPHONE (OUTPATIENT)
Dept: INTERNAL MEDICINE | Facility: CLINIC | Age: 71
End: 2021-09-10

## 2021-09-10 ENCOUNTER — TELEPHONE (OUTPATIENT)
Dept: BARIATRICS | Facility: CLINIC | Age: 71
End: 2021-09-10

## 2021-09-10 RX ORDER — LABETALOL 300 MG/1
300 TABLET, FILM COATED ORAL 2 TIMES DAILY
Qty: 180 TABLET | Refills: 3 | Status: SHIPPED | OUTPATIENT
Start: 2021-09-10 | End: 2021-09-12 | Stop reason: SDUPTHER

## 2021-09-12 RX ORDER — LABETALOL 300 MG/1
300 TABLET, FILM COATED ORAL 2 TIMES DAILY
Qty: 180 TABLET | Refills: 6 | Status: SHIPPED | OUTPATIENT
Start: 2021-09-12 | End: 2022-09-26 | Stop reason: SDUPTHER

## 2021-09-13 ENCOUNTER — OFFICE VISIT (OUTPATIENT)
Dept: NEPHROLOGY | Facility: CLINIC | Age: 71
End: 2021-09-13
Payer: MEDICARE

## 2021-09-13 ENCOUNTER — LAB VISIT (OUTPATIENT)
Dept: LAB | Facility: HOSPITAL | Age: 71
End: 2021-09-13
Attending: INTERNAL MEDICINE
Payer: MEDICARE

## 2021-09-13 ENCOUNTER — TELEPHONE (OUTPATIENT)
Dept: NEPHROLOGY | Facility: CLINIC | Age: 71
End: 2021-09-13

## 2021-09-13 ENCOUNTER — OFFICE VISIT (OUTPATIENT)
Dept: ENDOCRINOLOGY | Facility: CLINIC | Age: 71
End: 2021-09-13
Payer: MEDICARE

## 2021-09-13 ENCOUNTER — OFFICE VISIT (OUTPATIENT)
Dept: INTERNAL MEDICINE | Facility: CLINIC | Age: 71
End: 2021-09-13
Payer: MEDICARE

## 2021-09-13 VITALS — DIASTOLIC BLOOD PRESSURE: 84 MMHG | SYSTOLIC BLOOD PRESSURE: 142 MMHG | OXYGEN SATURATION: 99 % | HEART RATE: 70 BPM

## 2021-09-13 VITALS
HEIGHT: 65 IN | WEIGHT: 293 LBS | OXYGEN SATURATION: 98 % | BODY MASS INDEX: 48.82 KG/M2 | DIASTOLIC BLOOD PRESSURE: 78 MMHG | SYSTOLIC BLOOD PRESSURE: 154 MMHG | HEART RATE: 70 BPM

## 2021-09-13 VITALS
HEART RATE: 60 BPM | RESPIRATION RATE: 16 BRPM | BODY MASS INDEX: 48.82 KG/M2 | DIASTOLIC BLOOD PRESSURE: 55 MMHG | HEIGHT: 65 IN | WEIGHT: 293 LBS | SYSTOLIC BLOOD PRESSURE: 136 MMHG

## 2021-09-13 DIAGNOSIS — E11.42 TYPE 2 DIABETES MELLITUS WITH DIABETIC POLYNEUROPATHY, WITH LONG-TERM CURRENT USE OF INSULIN: ICD-10-CM

## 2021-09-13 DIAGNOSIS — E66.01 MORBID OBESITY WITH BMI OF 50.0-59.9, ADULT: ICD-10-CM

## 2021-09-13 DIAGNOSIS — N18.32 ANEMIA IN STAGE 3B CHRONIC KIDNEY DISEASE: ICD-10-CM

## 2021-09-13 DIAGNOSIS — D63.1 ANEMIA IN STAGE 3B CHRONIC KIDNEY DISEASE: ICD-10-CM

## 2021-09-13 DIAGNOSIS — Z91.199 NONCOMPLIANCE WITH CPAP TREATMENT: ICD-10-CM

## 2021-09-13 DIAGNOSIS — Z99.89 DEPENDENCE ON OTHER ENABLING MACHINES AND DEVICES: ICD-10-CM

## 2021-09-13 DIAGNOSIS — I10 ESSENTIAL HYPERTENSION: ICD-10-CM

## 2021-09-13 DIAGNOSIS — R26.9 ABNORMALITY OF GAIT AND MOBILITY: ICD-10-CM

## 2021-09-13 DIAGNOSIS — M46.96 UNSPECIFIED INFLAMMATORY SPONDYLOPATHY, LUMBAR REGION: ICD-10-CM

## 2021-09-13 DIAGNOSIS — Z00.00 ENCOUNTER FOR PREVENTIVE HEALTH EXAMINATION: Primary | ICD-10-CM

## 2021-09-13 DIAGNOSIS — Z79.4 TYPE 2 DIABETES MELLITUS WITH DIABETIC POLYNEUROPATHY, WITH LONG-TERM CURRENT USE OF INSULIN: ICD-10-CM

## 2021-09-13 DIAGNOSIS — Z86.73 HISTORY OF STROKE: ICD-10-CM

## 2021-09-13 DIAGNOSIS — N18.32 ANEMIA OF CHRONIC RENAL FAILURE, STAGE 3B: ICD-10-CM

## 2021-09-13 DIAGNOSIS — N18.4 TYPE 2 DM WITH CKD STAGE 4 AND HYPERTENSION: ICD-10-CM

## 2021-09-13 DIAGNOSIS — E11.43 DIABETIC AUTONOMIC NEUROPATHY ASSOCIATED WITH TYPE 2 DIABETES MELLITUS: ICD-10-CM

## 2021-09-13 DIAGNOSIS — E78.5 DYSLIPIDEMIA: ICD-10-CM

## 2021-09-13 DIAGNOSIS — N18.32 STAGE 3B CHRONIC KIDNEY DISEASE: Primary | ICD-10-CM

## 2021-09-13 DIAGNOSIS — D63.1 ANEMIA OF CHRONIC RENAL FAILURE, STAGE 3B: ICD-10-CM

## 2021-09-13 DIAGNOSIS — G47.33 OSA (OBSTRUCTIVE SLEEP APNEA): Primary | ICD-10-CM

## 2021-09-13 DIAGNOSIS — N25.81 SECONDARY HYPERPARATHYROIDISM: ICD-10-CM

## 2021-09-13 DIAGNOSIS — L97.522 ULCER OF TOE OF LEFT FOOT, WITH FAT LAYER EXPOSED: ICD-10-CM

## 2021-09-13 DIAGNOSIS — E11.649 HYPOGLYCEMIA UNAWARENESS ASSOCIATED WITH TYPE 2 DIABETES MELLITUS: Chronic | ICD-10-CM

## 2021-09-13 DIAGNOSIS — E11.22 TYPE 2 DM WITH CKD STAGE 4 AND HYPERTENSION: ICD-10-CM

## 2021-09-13 DIAGNOSIS — J45.909 ASTHMA, UNSPECIFIED ASTHMA SEVERITY, UNSPECIFIED WHETHER COMPLICATED, UNSPECIFIED WHETHER PERSISTENT: ICD-10-CM

## 2021-09-13 DIAGNOSIS — N18.30 CKD STAGE 3 DUE TO TYPE 2 DIABETES MELLITUS: ICD-10-CM

## 2021-09-13 DIAGNOSIS — E66.01 CLASS 3 SEVERE OBESITY DUE TO EXCESS CALORIES WITH SERIOUS COMORBIDITY AND BODY MASS INDEX (BMI) OF 50.0 TO 59.9 IN ADULT: ICD-10-CM

## 2021-09-13 DIAGNOSIS — M1A.00X0 IDIOPATHIC CHRONIC GOUT WITHOUT TOPHUS, UNSPECIFIED SITE: ICD-10-CM

## 2021-09-13 DIAGNOSIS — E11.69 DYSLIPIDEMIA ASSOCIATED WITH TYPE 2 DIABETES MELLITUS: ICD-10-CM

## 2021-09-13 DIAGNOSIS — N18.32 STAGE 3B CHRONIC KIDNEY DISEASE: ICD-10-CM

## 2021-09-13 DIAGNOSIS — G47.33 OSA (OBSTRUCTIVE SLEEP APNEA): ICD-10-CM

## 2021-09-13 DIAGNOSIS — G95.9 CERVICAL MYELOPATHY: ICD-10-CM

## 2021-09-13 DIAGNOSIS — I77.819 ECTATIC AORTA: ICD-10-CM

## 2021-09-13 DIAGNOSIS — K21.9 GASTROESOPHAGEAL REFLUX DISEASE WITHOUT ESOPHAGITIS: ICD-10-CM

## 2021-09-13 DIAGNOSIS — N18.4 CHRONIC KIDNEY DISEASE, STAGE 4 (SEVERE): ICD-10-CM

## 2021-09-13 DIAGNOSIS — I73.9 PAD (PERIPHERAL ARTERY DISEASE): ICD-10-CM

## 2021-09-13 DIAGNOSIS — M79.605 LEFT LEG PAIN: ICD-10-CM

## 2021-09-13 DIAGNOSIS — I15.2 HYPERTENSION ASSOCIATED WITH DIABETES: ICD-10-CM

## 2021-09-13 DIAGNOSIS — I12.9 TYPE 2 DM WITH CKD STAGE 4 AND HYPERTENSION: ICD-10-CM

## 2021-09-13 DIAGNOSIS — E11.22 CKD STAGE 3 DUE TO TYPE 2 DIABETES MELLITUS: ICD-10-CM

## 2021-09-13 DIAGNOSIS — I82.409 DEEP VEIN THROMBOSIS (DVT) OF LOWER EXTREMITY, UNSPECIFIED CHRONICITY, UNSPECIFIED LATERALITY, UNSPECIFIED VEIN: ICD-10-CM

## 2021-09-13 DIAGNOSIS — E78.5 DYSLIPIDEMIA ASSOCIATED WITH TYPE 2 DIABETES MELLITUS: ICD-10-CM

## 2021-09-13 DIAGNOSIS — N18.4 STAGE 4 CHRONIC KIDNEY DISEASE: ICD-10-CM

## 2021-09-13 DIAGNOSIS — G89.4 CHRONIC PAIN SYNDROME: ICD-10-CM

## 2021-09-13 DIAGNOSIS — I51.89 DIASTOLIC DYSFUNCTION: Chronic | ICD-10-CM

## 2021-09-13 DIAGNOSIS — E11.59 HYPERTENSION ASSOCIATED WITH DIABETES: ICD-10-CM

## 2021-09-13 LAB
25(OH)D3+25(OH)D2 SERPL-MCNC: 47 NG/ML (ref 30–96)
ALBUMIN SERPL BCP-MCNC: 3.4 G/DL (ref 3.5–5.2)
ANION GAP SERPL CALC-SCNC: 10 MMOL/L (ref 8–16)
BUN SERPL-MCNC: 18 MG/DL (ref 8–23)
CALCIUM SERPL-MCNC: 10 MG/DL (ref 8.7–10.5)
CHLORIDE SERPL-SCNC: 105 MMOL/L (ref 95–110)
CO2 SERPL-SCNC: 26 MMOL/L (ref 23–29)
CREAT SERPL-MCNC: 1.4 MG/DL (ref 0.5–1.4)
ERYTHROCYTE [DISTWIDTH] IN BLOOD BY AUTOMATED COUNT: 15.7 % (ref 11.5–14.5)
EST. GFR  (AFRICAN AMERICAN): 43.6 ML/MIN/1.73 M^2
EST. GFR  (NON AFRICAN AMERICAN): 37.8 ML/MIN/1.73 M^2
FERRITIN SERPL-MCNC: 437 NG/ML (ref 20–300)
GLUCOSE SERPL-MCNC: 153 MG/DL (ref 70–110)
HCT VFR BLD AUTO: 32 % (ref 37–48.5)
HGB BLD-MCNC: 10.3 G/DL (ref 12–16)
IRON SERPL-MCNC: 45 UG/DL (ref 30–160)
MCH RBC QN AUTO: 29.3 PG (ref 27–31)
MCHC RBC AUTO-ENTMCNC: 32.2 G/DL (ref 32–36)
MCV RBC AUTO: 91 FL (ref 82–98)
PHOSPHATE SERPL-MCNC: 3 MG/DL (ref 2.7–4.5)
PLATELET # BLD AUTO: 281 K/UL (ref 150–450)
PMV BLD AUTO: 10.9 FL (ref 9.2–12.9)
POTASSIUM SERPL-SCNC: 4.4 MMOL/L (ref 3.5–5.1)
RBC # BLD AUTO: 3.51 M/UL (ref 4–5.4)
SATURATED IRON: 16 % (ref 20–50)
SODIUM SERPL-SCNC: 141 MMOL/L (ref 136–145)
TOTAL IRON BINDING CAPACITY: 275 UG/DL (ref 250–450)
TRANSFERRIN SERPL-MCNC: 186 MG/DL (ref 200–375)
WBC # BLD AUTO: 5.31 K/UL (ref 3.9–12.7)

## 2021-09-13 PROCEDURE — 1159F MED LIST DOCD IN RCRD: CPT | Mod: CPTII,S$GLB,, | Performed by: INTERNAL MEDICINE

## 2021-09-13 PROCEDURE — 3044F HG A1C LEVEL LT 7.0%: CPT | Mod: CPTII,S$GLB,, | Performed by: INTERNAL MEDICINE

## 2021-09-13 PROCEDURE — 99214 PR OFFICE/OUTPT VISIT, EST, LEVL IV, 30-39 MIN: ICD-10-PCS | Mod: S$GLB,,, | Performed by: INTERNAL MEDICINE

## 2021-09-13 PROCEDURE — 99999 PR PBB SHADOW E&M-EST. PATIENT-LVL III: ICD-10-PCS | Mod: PBBFAC,,, | Performed by: NURSE PRACTITIONER

## 2021-09-13 PROCEDURE — 3066F PR DOCUMENTATION OF TREATMENT FOR NEPHROPATHY: ICD-10-PCS | Mod: CPTII,S$GLB,, | Performed by: NURSE PRACTITIONER

## 2021-09-13 PROCEDURE — 1125F PR PAIN SEVERITY QUANTIFIED, PAIN PRESENT: ICD-10-PCS | Mod: CPTII,S$GLB,, | Performed by: INTERNAL MEDICINE

## 2021-09-13 PROCEDURE — 3044F PR MOST RECENT HEMOGLOBIN A1C LEVEL <7.0%: ICD-10-PCS | Mod: CPTII,S$GLB,, | Performed by: INTERNAL MEDICINE

## 2021-09-13 PROCEDURE — 3288F PR FALLS RISK ASSESSMENT DOCUMENTED: ICD-10-PCS | Mod: CPTII,S$GLB,, | Performed by: NURSE PRACTITIONER

## 2021-09-13 PROCEDURE — 99499 RISK ADDL DX/OHS AUDIT: ICD-10-PCS | Mod: S$GLB,,, | Performed by: NURSE PRACTITIONER

## 2021-09-13 PROCEDURE — 1125F PR PAIN SEVERITY QUANTIFIED, PAIN PRESENT: ICD-10-PCS | Mod: CPTII,S$GLB,, | Performed by: NURSE PRACTITIONER

## 2021-09-13 PROCEDURE — 3061F NEG MICROALBUMINURIA REV: CPT | Mod: CPTII,S$GLB,, | Performed by: INTERNAL MEDICINE

## 2021-09-13 PROCEDURE — 1159F MED LIST DOCD IN RCRD: CPT | Mod: CPTII,S$GLB,, | Performed by: NURSE PRACTITIONER

## 2021-09-13 PROCEDURE — 3066F NEPHROPATHY DOC TX: CPT | Mod: CPTII,S$GLB,, | Performed by: INTERNAL MEDICINE

## 2021-09-13 PROCEDURE — 3061F PR NEG MICROALBUMINURIA RESULT DOCUMENTED/REVIEW: ICD-10-PCS | Mod: CPTII,S$GLB,, | Performed by: NURSE PRACTITIONER

## 2021-09-13 PROCEDURE — 82306 VITAMIN D 25 HYDROXY: CPT | Performed by: INTERNAL MEDICINE

## 2021-09-13 PROCEDURE — 3008F PR BODY MASS INDEX (BMI) DOCUMENTED: ICD-10-PCS | Mod: CPTII,S$GLB,, | Performed by: INTERNAL MEDICINE

## 2021-09-13 PROCEDURE — 1100F PR PT FALLS ASSESS DOC 2+ FALLS/FALL W/INJURY/YR: ICD-10-PCS | Mod: CPTII,S$GLB,, | Performed by: NURSE PRACTITIONER

## 2021-09-13 PROCEDURE — 3078F PR MOST RECENT DIASTOLIC BLOOD PRESSURE < 80 MM HG: ICD-10-PCS | Mod: CPTII,S$GLB,, | Performed by: INTERNAL MEDICINE

## 2021-09-13 PROCEDURE — 3077F PR MOST RECENT SYSTOLIC BLOOD PRESSURE >= 140 MM HG: ICD-10-PCS | Mod: CPTII,S$GLB,, | Performed by: INTERNAL MEDICINE

## 2021-09-13 PROCEDURE — 3008F PR BODY MASS INDEX (BMI) DOCUMENTED: ICD-10-PCS | Mod: CPTII,S$GLB,, | Performed by: NURSE PRACTITIONER

## 2021-09-13 PROCEDURE — 84466 ASSAY OF TRANSFERRIN: CPT | Performed by: INTERNAL MEDICINE

## 2021-09-13 PROCEDURE — 3066F PR DOCUMENTATION OF TREATMENT FOR NEPHROPATHY: ICD-10-PCS | Mod: CPTII,S$GLB,, | Performed by: INTERNAL MEDICINE

## 2021-09-13 PROCEDURE — 4010F PR ACE/ARB THEARPY RXD/TAKEN: ICD-10-PCS | Mod: CPTII,S$GLB,, | Performed by: NURSE PRACTITIONER

## 2021-09-13 PROCEDURE — 3078F PR MOST RECENT DIASTOLIC BLOOD PRESSURE < 80 MM HG: ICD-10-PCS | Mod: CPTII,S$GLB,, | Performed by: NURSE PRACTITIONER

## 2021-09-13 PROCEDURE — 3078F DIAST BP <80 MM HG: CPT | Mod: CPTII,S$GLB,, | Performed by: INTERNAL MEDICINE

## 2021-09-13 PROCEDURE — 1159F PR MEDICATION LIST DOCUMENTED IN MEDICAL RECORD: ICD-10-PCS | Mod: CPTII,S$GLB,, | Performed by: NURSE PRACTITIONER

## 2021-09-13 PROCEDURE — 1125F AMNT PAIN NOTED PAIN PRSNT: CPT | Mod: CPTII,S$GLB,, | Performed by: INTERNAL MEDICINE

## 2021-09-13 PROCEDURE — 1101F PR PT FALLS ASSESS DOC 0-1 FALLS W/OUT INJ PAST YR: ICD-10-PCS | Mod: CPTII,S$GLB,, | Performed by: INTERNAL MEDICINE

## 2021-09-13 PROCEDURE — 3077F SYST BP >= 140 MM HG: CPT | Mod: CPTII,S$GLB,, | Performed by: INTERNAL MEDICINE

## 2021-09-13 PROCEDURE — 99499 UNLISTED E&M SERVICE: CPT | Mod: S$GLB,,, | Performed by: NURSE PRACTITIONER

## 2021-09-13 PROCEDURE — 99999 PR PBB SHADOW E&M-EST. PATIENT-LVL III: ICD-10-PCS | Mod: PBBFAC,,, | Performed by: INTERNAL MEDICINE

## 2021-09-13 PROCEDURE — 4010F PR ACE/ARB THEARPY RXD/TAKEN: ICD-10-PCS | Mod: CPTII,S$GLB,, | Performed by: INTERNAL MEDICINE

## 2021-09-13 PROCEDURE — 3061F PR NEG MICROALBUMINURIA RESULT DOCUMENTED/REVIEW: ICD-10-PCS | Mod: CPTII,S$GLB,, | Performed by: INTERNAL MEDICINE

## 2021-09-13 PROCEDURE — 1160F RVW MEDS BY RX/DR IN RCRD: CPT | Mod: CPTII,S$GLB,, | Performed by: NURSE PRACTITIONER

## 2021-09-13 PROCEDURE — 1101F PT FALLS ASSESS-DOCD LE1/YR: CPT | Mod: CPTII,S$GLB,, | Performed by: INTERNAL MEDICINE

## 2021-09-13 PROCEDURE — 3044F PR MOST RECENT HEMOGLOBIN A1C LEVEL <7.0%: ICD-10-PCS | Mod: CPTII,S$GLB,, | Performed by: NURSE PRACTITIONER

## 2021-09-13 PROCEDURE — 3288F PR FALLS RISK ASSESSMENT DOCUMENTED: ICD-10-PCS | Mod: CPTII,S$GLB,, | Performed by: INTERNAL MEDICINE

## 2021-09-13 PROCEDURE — 4010F ACE/ARB THERAPY RXD/TAKEN: CPT | Mod: CPTII,S$GLB,, | Performed by: INTERNAL MEDICINE

## 2021-09-13 PROCEDURE — 85027 COMPLETE CBC AUTOMATED: CPT | Performed by: INTERNAL MEDICINE

## 2021-09-13 PROCEDURE — 3288F FALL RISK ASSESSMENT DOCD: CPT | Mod: CPTII,S$GLB,, | Performed by: INTERNAL MEDICINE

## 2021-09-13 PROCEDURE — 3008F BODY MASS INDEX DOCD: CPT | Mod: CPTII,S$GLB,, | Performed by: NURSE PRACTITIONER

## 2021-09-13 PROCEDURE — 1159F PR MEDICATION LIST DOCUMENTED IN MEDICAL RECORD: ICD-10-PCS | Mod: CPTII,S$GLB,, | Performed by: INTERNAL MEDICINE

## 2021-09-13 PROCEDURE — 1160F PR REVIEW ALL MEDS BY PRESCRIBER/CLIN PHARMACIST DOCUMENTED: ICD-10-PCS | Mod: CPTII,S$GLB,, | Performed by: NURSE PRACTITIONER

## 2021-09-13 PROCEDURE — 3079F DIAST BP 80-89 MM HG: CPT | Mod: CPTII,S$GLB,, | Performed by: INTERNAL MEDICINE

## 2021-09-13 PROCEDURE — G0439 PR MEDICARE ANNUAL WELLNESS SUBSEQUENT VISIT: ICD-10-PCS | Mod: S$GLB,,, | Performed by: NURSE PRACTITIONER

## 2021-09-13 PROCEDURE — 99214 OFFICE O/P EST MOD 30 MIN: CPT | Mod: S$GLB,,, | Performed by: INTERNAL MEDICINE

## 2021-09-13 PROCEDURE — 3044F HG A1C LEVEL LT 7.0%: CPT | Mod: CPTII,S$GLB,, | Performed by: NURSE PRACTITIONER

## 2021-09-13 PROCEDURE — 3066F NEPHROPATHY DOC TX: CPT | Mod: CPTII,S$GLB,, | Performed by: NURSE PRACTITIONER

## 2021-09-13 PROCEDURE — 99999 PR PBB SHADOW E&M-EST. PATIENT-LVL III: CPT | Mod: PBBFAC,,, | Performed by: INTERNAL MEDICINE

## 2021-09-13 PROCEDURE — 3078F DIAST BP <80 MM HG: CPT | Mod: CPTII,S$GLB,, | Performed by: NURSE PRACTITIONER

## 2021-09-13 PROCEDURE — 1100F PTFALLS ASSESS-DOCD GE2>/YR: CPT | Mod: CPTII,S$GLB,, | Performed by: NURSE PRACTITIONER

## 2021-09-13 PROCEDURE — 3061F NEG MICROALBUMINURIA REV: CPT | Mod: CPTII,S$GLB,, | Performed by: NURSE PRACTITIONER

## 2021-09-13 PROCEDURE — 80069 RENAL FUNCTION PANEL: CPT | Performed by: INTERNAL MEDICINE

## 2021-09-13 PROCEDURE — 1125F AMNT PAIN NOTED PAIN PRSNT: CPT | Mod: CPTII,S$GLB,, | Performed by: NURSE PRACTITIONER

## 2021-09-13 PROCEDURE — 99999 PR PBB SHADOW E&M-EST. PATIENT-LVL V: ICD-10-PCS | Mod: PBBFAC,,, | Performed by: INTERNAL MEDICINE

## 2021-09-13 PROCEDURE — 99999 PR PBB SHADOW E&M-EST. PATIENT-LVL V: CPT | Mod: PBBFAC,,, | Performed by: INTERNAL MEDICINE

## 2021-09-13 PROCEDURE — 4010F ACE/ARB THERAPY RXD/TAKEN: CPT | Mod: CPTII,S$GLB,, | Performed by: NURSE PRACTITIONER

## 2021-09-13 PROCEDURE — 36415 COLL VENOUS BLD VENIPUNCTURE: CPT | Performed by: INTERNAL MEDICINE

## 2021-09-13 PROCEDURE — 3075F SYST BP GE 130 - 139MM HG: CPT | Mod: CPTII,S$GLB,, | Performed by: NURSE PRACTITIONER

## 2021-09-13 PROCEDURE — 3288F FALL RISK ASSESSMENT DOCD: CPT | Mod: CPTII,S$GLB,, | Performed by: NURSE PRACTITIONER

## 2021-09-13 PROCEDURE — 3079F PR MOST RECENT DIASTOLIC BLOOD PRESSURE 80-89 MM HG: ICD-10-PCS | Mod: CPTII,S$GLB,, | Performed by: INTERNAL MEDICINE

## 2021-09-13 PROCEDURE — 3075F PR MOST RECENT SYSTOLIC BLOOD PRESS GE 130-139MM HG: ICD-10-PCS | Mod: CPTII,S$GLB,, | Performed by: NURSE PRACTITIONER

## 2021-09-13 PROCEDURE — 3008F BODY MASS INDEX DOCD: CPT | Mod: CPTII,S$GLB,, | Performed by: INTERNAL MEDICINE

## 2021-09-13 PROCEDURE — 82728 ASSAY OF FERRITIN: CPT | Performed by: INTERNAL MEDICINE

## 2021-09-13 PROCEDURE — G0439 PPPS, SUBSEQ VISIT: HCPCS | Mod: S$GLB,,, | Performed by: NURSE PRACTITIONER

## 2021-09-13 PROCEDURE — 99999 PR PBB SHADOW E&M-EST. PATIENT-LVL III: CPT | Mod: PBBFAC,,, | Performed by: NURSE PRACTITIONER

## 2021-09-13 RX ORDER — INSULIN HUMAN 100 [IU]/ML
INJECTION, SUSPENSION SUBCUTANEOUS
Qty: 3 SYRINGE | Refills: 3 | Status: SHIPPED | OUTPATIENT
Start: 2021-09-13 | End: 2021-11-02

## 2021-09-13 RX ORDER — GLUCAGON INJECTION, SOLUTION 0.5 MG/.1ML
1 INJECTION, SOLUTION SUBCUTANEOUS DAILY PRN
Qty: 1 SYRINGE | Refills: 3 | Status: SHIPPED | OUTPATIENT
Start: 2021-09-13

## 2021-09-13 RX ORDER — GLUCAGON INJECTION, SOLUTION 0.5 MG/.1ML
1 INJECTION, SOLUTION SUBCUTANEOUS DAILY PRN
Qty: 1 SYRINGE | Refills: 3 | Status: SHIPPED | OUTPATIENT
Start: 2021-09-13 | End: 2021-09-13 | Stop reason: SDUPTHER

## 2021-09-13 RX ORDER — PEN NEEDLE, DIABETIC 31 GX5/16"
NEEDLE, DISPOSABLE MISCELLANEOUS
Qty: 200 EACH | Refills: 20 | Status: SHIPPED | OUTPATIENT
Start: 2021-09-13 | End: 2021-09-13 | Stop reason: SDUPTHER

## 2021-09-13 RX ORDER — LANOLIN ALCOHOL/MO/W.PET/CERES
1 CREAM (GRAM) TOPICAL DAILY
Qty: 90 TABLET | Refills: 6 | Status: SHIPPED | OUTPATIENT
Start: 2021-09-13 | End: 2023-01-12

## 2021-09-13 RX ORDER — PEN NEEDLE, DIABETIC 31 GX5/16"
NEEDLE, DISPOSABLE MISCELLANEOUS
Qty: 200 EACH | Refills: 20 | Status: SHIPPED | OUTPATIENT
Start: 2021-09-13 | End: 2022-01-21 | Stop reason: SDUPTHER

## 2021-09-14 ENCOUNTER — TELEPHONE (OUTPATIENT)
Dept: SLEEP MEDICINE | Facility: CLINIC | Age: 71
End: 2021-09-14

## 2021-09-15 ENCOUNTER — TELEPHONE (OUTPATIENT)
Dept: INTERNAL MEDICINE | Facility: CLINIC | Age: 71
End: 2021-09-15

## 2021-09-15 ENCOUNTER — HOSPITAL ENCOUNTER (OUTPATIENT)
Dept: RADIOLOGY | Facility: HOSPITAL | Age: 71
Discharge: HOME | End: 2021-09-15
Attending: NURSE PRACTITIONER
Payer: MEDICARE

## 2021-09-15 ENCOUNTER — OFFICE VISIT (OUTPATIENT)
Dept: INTERNAL MEDICINE | Facility: CLINIC | Age: 71
End: 2021-09-15
Payer: MEDICARE

## 2021-09-15 VITALS
WEIGHT: 293 LBS | HEART RATE: 85 BPM | SYSTOLIC BLOOD PRESSURE: 142 MMHG | DIASTOLIC BLOOD PRESSURE: 76 MMHG | OXYGEN SATURATION: 98 % | BODY MASS INDEX: 48.82 KG/M2 | HEIGHT: 65 IN

## 2021-09-15 DIAGNOSIS — M79.89 LEFT LEG SWELLING: ICD-10-CM

## 2021-09-15 DIAGNOSIS — M79.605 LEG PAIN, LEFT: ICD-10-CM

## 2021-09-15 DIAGNOSIS — L03.032 INFECTION OF NAIL BED OF TOE OF LEFT FOOT: ICD-10-CM

## 2021-09-15 DIAGNOSIS — M79.605 LEG PAIN, LEFT: Primary | ICD-10-CM

## 2021-09-15 PROCEDURE — 93970 EXTREMITY STUDY: CPT | Mod: TC

## 2021-09-15 PROCEDURE — 99214 PR OFFICE/OUTPT VISIT, EST, LEVL IV, 30-39 MIN: ICD-10-PCS | Mod: S$GLB,,, | Performed by: NURSE PRACTITIONER

## 2021-09-15 PROCEDURE — 93970 EXTREMITY STUDY: CPT | Mod: 26,,, | Performed by: RADIOLOGY

## 2021-09-15 PROCEDURE — 3288F FALL RISK ASSESSMENT DOCD: CPT | Mod: CPTII,S$GLB,, | Performed by: NURSE PRACTITIONER

## 2021-09-15 PROCEDURE — 3044F PR MOST RECENT HEMOGLOBIN A1C LEVEL <7.0%: ICD-10-PCS | Mod: CPTII,S$GLB,, | Performed by: NURSE PRACTITIONER

## 2021-09-15 PROCEDURE — 1159F PR MEDICATION LIST DOCUMENTED IN MEDICAL RECORD: ICD-10-PCS | Mod: CPTII,S$GLB,, | Performed by: NURSE PRACTITIONER

## 2021-09-15 PROCEDURE — 3044F HG A1C LEVEL LT 7.0%: CPT | Mod: CPTII,S$GLB,, | Performed by: NURSE PRACTITIONER

## 2021-09-15 PROCEDURE — 3288F PR FALLS RISK ASSESSMENT DOCUMENTED: ICD-10-PCS | Mod: CPTII,S$GLB,, | Performed by: NURSE PRACTITIONER

## 2021-09-15 PROCEDURE — 3008F BODY MASS INDEX DOCD: CPT | Mod: CPTII,S$GLB,, | Performed by: NURSE PRACTITIONER

## 2021-09-15 PROCEDURE — 3077F PR MOST RECENT SYSTOLIC BLOOD PRESSURE >= 140 MM HG: ICD-10-PCS | Mod: CPTII,S$GLB,, | Performed by: NURSE PRACTITIONER

## 2021-09-15 PROCEDURE — 3078F PR MOST RECENT DIASTOLIC BLOOD PRESSURE < 80 MM HG: ICD-10-PCS | Mod: CPTII,S$GLB,, | Performed by: NURSE PRACTITIONER

## 2021-09-15 PROCEDURE — 99214 OFFICE O/P EST MOD 30 MIN: CPT | Mod: S$GLB,,, | Performed by: NURSE PRACTITIONER

## 2021-09-15 PROCEDURE — 3066F NEPHROPATHY DOC TX: CPT | Mod: CPTII,S$GLB,, | Performed by: NURSE PRACTITIONER

## 2021-09-15 PROCEDURE — 1100F PTFALLS ASSESS-DOCD GE2>/YR: CPT | Mod: CPTII,S$GLB,, | Performed by: NURSE PRACTITIONER

## 2021-09-15 PROCEDURE — 1100F PR PT FALLS ASSESS DOC 2+ FALLS/FALL W/INJURY/YR: ICD-10-PCS | Mod: CPTII,S$GLB,, | Performed by: NURSE PRACTITIONER

## 2021-09-15 PROCEDURE — 1125F AMNT PAIN NOTED PAIN PRSNT: CPT | Mod: CPTII,S$GLB,, | Performed by: NURSE PRACTITIONER

## 2021-09-15 PROCEDURE — 99999 PR PBB SHADOW E&M-EST. PATIENT-LVL III: CPT | Mod: PBBFAC,,, | Performed by: NURSE PRACTITIONER

## 2021-09-15 PROCEDURE — 3078F DIAST BP <80 MM HG: CPT | Mod: CPTII,S$GLB,, | Performed by: NURSE PRACTITIONER

## 2021-09-15 PROCEDURE — 99999 PR PBB SHADOW E&M-EST. PATIENT-LVL III: ICD-10-PCS | Mod: PBBFAC,,, | Performed by: NURSE PRACTITIONER

## 2021-09-15 PROCEDURE — 3061F NEG MICROALBUMINURIA REV: CPT | Mod: CPTII,S$GLB,, | Performed by: NURSE PRACTITIONER

## 2021-09-15 PROCEDURE — 1160F RVW MEDS BY RX/DR IN RCRD: CPT | Mod: CPTII,S$GLB,, | Performed by: NURSE PRACTITIONER

## 2021-09-15 PROCEDURE — 1159F MED LIST DOCD IN RCRD: CPT | Mod: CPTII,S$GLB,, | Performed by: NURSE PRACTITIONER

## 2021-09-15 PROCEDURE — 3061F PR NEG MICROALBUMINURIA RESULT DOCUMENTED/REVIEW: ICD-10-PCS | Mod: CPTII,S$GLB,, | Performed by: NURSE PRACTITIONER

## 2021-09-15 PROCEDURE — 3077F SYST BP >= 140 MM HG: CPT | Mod: CPTII,S$GLB,, | Performed by: NURSE PRACTITIONER

## 2021-09-15 PROCEDURE — 1160F PR REVIEW ALL MEDS BY PRESCRIBER/CLIN PHARMACIST DOCUMENTED: ICD-10-PCS | Mod: CPTII,S$GLB,, | Performed by: NURSE PRACTITIONER

## 2021-09-15 PROCEDURE — 4010F ACE/ARB THERAPY RXD/TAKEN: CPT | Mod: CPTII,S$GLB,, | Performed by: NURSE PRACTITIONER

## 2021-09-15 PROCEDURE — 3008F PR BODY MASS INDEX (BMI) DOCUMENTED: ICD-10-PCS | Mod: CPTII,S$GLB,, | Performed by: NURSE PRACTITIONER

## 2021-09-15 PROCEDURE — 93970 US LOWER EXTREMITY VEINS BILATERAL: ICD-10-PCS | Mod: 26,,, | Performed by: RADIOLOGY

## 2021-09-15 PROCEDURE — 3066F PR DOCUMENTATION OF TREATMENT FOR NEPHROPATHY: ICD-10-PCS | Mod: CPTII,S$GLB,, | Performed by: NURSE PRACTITIONER

## 2021-09-15 PROCEDURE — 4010F PR ACE/ARB THEARPY RXD/TAKEN: ICD-10-PCS | Mod: CPTII,S$GLB,, | Performed by: NURSE PRACTITIONER

## 2021-09-15 PROCEDURE — 1125F PR PAIN SEVERITY QUANTIFIED, PAIN PRESENT: ICD-10-PCS | Mod: CPTII,S$GLB,, | Performed by: NURSE PRACTITIONER

## 2021-09-15 RX ORDER — DOXYCYCLINE HYCLATE 100 MG
100 TABLET ORAL 2 TIMES DAILY
Qty: 20 TABLET | Refills: 0 | Status: SHIPPED | OUTPATIENT
Start: 2021-09-15 | End: 2021-09-25

## 2021-09-15 RX ORDER — DOXYCYCLINE HYCLATE 100 MG
100 TABLET ORAL 2 TIMES DAILY
Qty: 14 TABLET | Refills: 0 | Status: SHIPPED | OUTPATIENT
Start: 2021-09-15 | End: 2021-09-15

## 2021-09-16 ENCOUNTER — TELEPHONE (OUTPATIENT)
Dept: INTERNAL MEDICINE | Facility: CLINIC | Age: 71
End: 2021-09-16

## 2021-09-19 ENCOUNTER — CLINICAL SUPPORT (OUTPATIENT)
Dept: URGENT CARE | Facility: CLINIC | Age: 71
End: 2021-09-19
Payer: MEDICARE

## 2021-09-19 DIAGNOSIS — Z01.818 PRE-OP TESTING: ICD-10-CM

## 2021-09-19 PROCEDURE — U0003 INFECTIOUS AGENT DETECTION BY NUCLEIC ACID (DNA OR RNA); SEVERE ACUTE RESPIRATORY SYNDROME CORONAVIRUS 2 (SARS-COV-2) (CORONAVIRUS DISEASE [COVID-19]), AMPLIFIED PROBE TECHNIQUE, MAKING USE OF HIGH THROUGHPUT TECHNOLOGIES AS DESCRIBED BY CMS-2020-01-R: HCPCS | Performed by: INTERNAL MEDICINE

## 2021-09-19 PROCEDURE — U0005 INFEC AGEN DETEC AMPLI PROBE: HCPCS | Performed by: INTERNAL MEDICINE

## 2021-09-20 ENCOUNTER — TELEPHONE (OUTPATIENT)
Dept: SLEEP MEDICINE | Facility: OTHER | Age: 71
End: 2021-09-20

## 2021-09-20 ENCOUNTER — TELEPHONE (OUTPATIENT)
Dept: PRIMARY CARE CLINIC | Facility: CLINIC | Age: 71
End: 2021-09-20

## 2021-09-20 LAB
SARS-COV-2 RNA RESP QL NAA+PROBE: NOT DETECTED
SARS-COV-2- CYCLE NUMBER: NORMAL

## 2021-09-22 ENCOUNTER — TELEPHONE (OUTPATIENT)
Dept: SLEEP MEDICINE | Facility: OTHER | Age: 71
End: 2021-09-22

## 2021-09-22 ENCOUNTER — HOSPITAL ENCOUNTER (OUTPATIENT)
Dept: SLEEP MEDICINE | Facility: OTHER | Age: 71
Discharge: HOME | End: 2021-09-22
Attending: INTERNAL MEDICINE
Payer: MEDICARE

## 2021-09-22 DIAGNOSIS — N18.4 STAGE 4 CHRONIC KIDNEY DISEASE: ICD-10-CM

## 2021-09-22 DIAGNOSIS — G47.33 OSA (OBSTRUCTIVE SLEEP APNEA): ICD-10-CM

## 2021-09-22 DIAGNOSIS — Z86.73 HISTORY OF STROKE: ICD-10-CM

## 2021-09-22 DIAGNOSIS — Z79.4 TYPE 2 DIABETES MELLITUS WITH DIABETIC POLYNEUROPATHY, WITH LONG-TERM CURRENT USE OF INSULIN: ICD-10-CM

## 2021-09-22 DIAGNOSIS — E11.42 TYPE 2 DIABETES MELLITUS WITH DIABETIC POLYNEUROPATHY, WITH LONG-TERM CURRENT USE OF INSULIN: ICD-10-CM

## 2021-09-22 DIAGNOSIS — Z91.199 NONCOMPLIANCE WITH CPAP TREATMENT: ICD-10-CM

## 2021-09-22 DIAGNOSIS — I10 ESSENTIAL HYPERTENSION: ICD-10-CM

## 2021-09-22 DIAGNOSIS — E66.01 MORBID OBESITY WITH BMI OF 50.0-59.9, ADULT: ICD-10-CM

## 2021-09-22 PROCEDURE — 95810 POLYSOM 6/> YRS 4/> PARAM: CPT

## 2021-09-22 PROCEDURE — 95810 PR POLYSOMNOGRAPHY, 4 OR MORE: ICD-10-PCS | Mod: 26,,, | Performed by: INTERNAL MEDICINE

## 2021-09-22 PROCEDURE — 95810 POLYSOM 6/> YRS 4/> PARAM: CPT | Mod: 26,,, | Performed by: INTERNAL MEDICINE

## 2021-09-27 ENCOUNTER — PATIENT MESSAGE (OUTPATIENT)
Dept: SLEEP MEDICINE | Facility: CLINIC | Age: 71
End: 2021-09-27

## 2021-09-27 ENCOUNTER — OFFICE VISIT (OUTPATIENT)
Dept: PODIATRY | Facility: CLINIC | Age: 71
End: 2021-09-27
Payer: MEDICARE

## 2021-09-27 VITALS — HEIGHT: 65 IN | RESPIRATION RATE: 18 BRPM | WEIGHT: 293 LBS | BODY MASS INDEX: 48.82 KG/M2

## 2021-09-27 DIAGNOSIS — E08.43 DIABETIC AUTONOMIC NEUROPATHY ASSOCIATED WITH DIABETES MELLITUS DUE TO UNDERLYING CONDITION: ICD-10-CM

## 2021-09-27 DIAGNOSIS — L84 CORN OR CALLUS: ICD-10-CM

## 2021-09-27 DIAGNOSIS — L97.522 ULCER OF TOE, LEFT, WITH FAT LAYER EXPOSED: Primary | ICD-10-CM

## 2021-09-27 DIAGNOSIS — B35.1 ONYCHOMYCOSIS DUE TO DERMATOPHYTE: ICD-10-CM

## 2021-09-27 PROCEDURE — 3008F BODY MASS INDEX DOCD: CPT | Mod: CPTII,S$GLB,, | Performed by: PODIATRIST

## 2021-09-27 PROCEDURE — 11042 PR DEBRIDEMENT, SKIN, SUB-Q TISSUE,=<20 SQ CM: ICD-10-PCS | Mod: 59,S$GLB,, | Performed by: PODIATRIST

## 2021-09-27 PROCEDURE — 1159F MED LIST DOCD IN RCRD: CPT | Mod: CPTII,S$GLB,, | Performed by: PODIATRIST

## 2021-09-27 PROCEDURE — 99999 PR PBB SHADOW E&M-EST. PATIENT-LVL V: ICD-10-PCS | Mod: PBBFAC,,, | Performed by: PODIATRIST

## 2021-09-27 PROCEDURE — 3044F HG A1C LEVEL LT 7.0%: CPT | Mod: CPTII,S$GLB,, | Performed by: PODIATRIST

## 2021-09-27 PROCEDURE — 99214 PR OFFICE/OUTPT VISIT, EST, LEVL IV, 30-39 MIN: ICD-10-PCS | Mod: 25,S$GLB,, | Performed by: PODIATRIST

## 2021-09-27 PROCEDURE — 1126F PR PAIN SEVERITY QUANTIFIED, NO PAIN PRESENT: ICD-10-PCS | Mod: CPTII,S$GLB,, | Performed by: PODIATRIST

## 2021-09-27 PROCEDURE — 3061F PR NEG MICROALBUMINURIA RESULT DOCUMENTED/REVIEW: ICD-10-PCS | Mod: CPTII,S$GLB,, | Performed by: PODIATRIST

## 2021-09-27 PROCEDURE — 3066F PR DOCUMENTATION OF TREATMENT FOR NEPHROPATHY: ICD-10-PCS | Mod: CPTII,S$GLB,, | Performed by: PODIATRIST

## 2021-09-27 PROCEDURE — 11056 PARNG/CUTG B9 HYPRKR LES 2-4: CPT | Mod: Q9,S$GLB,, | Performed by: PODIATRIST

## 2021-09-27 PROCEDURE — 3008F PR BODY MASS INDEX (BMI) DOCUMENTED: ICD-10-PCS | Mod: CPTII,S$GLB,, | Performed by: PODIATRIST

## 2021-09-27 PROCEDURE — 11056 PR TRIM BENIGN HYPERKERATOTIC SKIN LESION,2-4: ICD-10-PCS | Mod: Q9,S$GLB,, | Performed by: PODIATRIST

## 2021-09-27 PROCEDURE — 99999 PR PBB SHADOW E&M-EST. PATIENT-LVL V: CPT | Mod: PBBFAC,,, | Performed by: PODIATRIST

## 2021-09-27 PROCEDURE — 11042 DBRDMT SUBQ TIS 1ST 20SQCM/<: CPT | Mod: 59,S$GLB,, | Performed by: PODIATRIST

## 2021-09-27 PROCEDURE — 3044F PR MOST RECENT HEMOGLOBIN A1C LEVEL <7.0%: ICD-10-PCS | Mod: CPTII,S$GLB,, | Performed by: PODIATRIST

## 2021-09-27 PROCEDURE — 3061F NEG MICROALBUMINURIA REV: CPT | Mod: CPTII,S$GLB,, | Performed by: PODIATRIST

## 2021-09-27 PROCEDURE — 99214 OFFICE O/P EST MOD 30 MIN: CPT | Mod: 25,S$GLB,, | Performed by: PODIATRIST

## 2021-09-27 PROCEDURE — 11721 PR DEBRIDEMENT OF NAILS, 6 OR MORE: ICD-10-PCS | Mod: 59,Q9,S$GLB, | Performed by: PODIATRIST

## 2021-09-27 PROCEDURE — 1159F PR MEDICATION LIST DOCUMENTED IN MEDICAL RECORD: ICD-10-PCS | Mod: CPTII,S$GLB,, | Performed by: PODIATRIST

## 2021-09-27 PROCEDURE — 1126F AMNT PAIN NOTED NONE PRSNT: CPT | Mod: CPTII,S$GLB,, | Performed by: PODIATRIST

## 2021-09-27 PROCEDURE — 3066F NEPHROPATHY DOC TX: CPT | Mod: CPTII,S$GLB,, | Performed by: PODIATRIST

## 2021-09-27 PROCEDURE — 11721 DEBRIDE NAIL 6 OR MORE: CPT | Mod: 59,Q9,S$GLB, | Performed by: PODIATRIST

## 2021-09-27 PROCEDURE — 4010F PR ACE/ARB THEARPY RXD/TAKEN: ICD-10-PCS | Mod: CPTII,S$GLB,, | Performed by: PODIATRIST

## 2021-09-27 PROCEDURE — 4010F ACE/ARB THERAPY RXD/TAKEN: CPT | Mod: CPTII,S$GLB,, | Performed by: PODIATRIST

## 2021-10-04 ENCOUNTER — PATIENT MESSAGE (OUTPATIENT)
Dept: ADMINISTRATIVE | Facility: HOSPITAL | Age: 71
End: 2021-10-04

## 2021-10-04 ENCOUNTER — TELEPHONE (OUTPATIENT)
Dept: SLEEP MEDICINE | Facility: CLINIC | Age: 71
End: 2021-10-04

## 2021-10-04 DIAGNOSIS — Z79.4 TYPE 2 DIABETES MELLITUS WITHOUT COMPLICATION, WITH LONG-TERM CURRENT USE OF INSULIN: ICD-10-CM

## 2021-10-04 DIAGNOSIS — E11.9 TYPE 2 DIABETES MELLITUS WITHOUT COMPLICATION, WITH LONG-TERM CURRENT USE OF INSULIN: ICD-10-CM

## 2021-10-05 DIAGNOSIS — Z79.4 TYPE 2 DIABETES MELLITUS WITHOUT COMPLICATION, WITH LONG-TERM CURRENT USE OF INSULIN: Primary | ICD-10-CM

## 2021-10-05 DIAGNOSIS — E11.9 TYPE 2 DIABETES MELLITUS WITHOUT COMPLICATION, WITH LONG-TERM CURRENT USE OF INSULIN: Primary | ICD-10-CM

## 2021-10-07 ENCOUNTER — PATIENT OUTREACH (OUTPATIENT)
Dept: ADMINISTRATIVE | Facility: OTHER | Age: 71
End: 2021-10-07

## 2021-10-13 ENCOUNTER — OFFICE VISIT (OUTPATIENT)
Dept: PODIATRY | Facility: CLINIC | Age: 71
End: 2021-10-13
Payer: MEDICARE

## 2021-10-13 ENCOUNTER — EXTERNAL HOME HEALTH (OUTPATIENT)
Dept: HOME HEALTH SERVICES | Facility: HOSPITAL | Age: 71
End: 2021-10-13

## 2021-10-13 ENCOUNTER — TELEPHONE (OUTPATIENT)
Dept: PODIATRY | Facility: CLINIC | Age: 71
End: 2021-10-13

## 2021-10-13 ENCOUNTER — HOSPITAL ENCOUNTER (OUTPATIENT)
Dept: RADIOLOGY | Facility: HOSPITAL | Age: 71
Discharge: HOME | End: 2021-10-13
Attending: PODIATRIST
Payer: MEDICARE

## 2021-10-13 VITALS
BODY MASS INDEX: 50.74 KG/M2 | SYSTOLIC BLOOD PRESSURE: 150 MMHG | HEART RATE: 69 BPM | WEIGHT: 293 LBS | DIASTOLIC BLOOD PRESSURE: 67 MMHG

## 2021-10-13 DIAGNOSIS — E08.43 DIABETIC AUTONOMIC NEUROPATHY ASSOCIATED WITH DIABETES MELLITUS DUE TO UNDERLYING CONDITION: ICD-10-CM

## 2021-10-13 DIAGNOSIS — L97.522 ULCER OF TOE, LEFT, WITH FAT LAYER EXPOSED: Primary | ICD-10-CM

## 2021-10-13 DIAGNOSIS — L97.522 ULCER OF TOE, LEFT, WITH FAT LAYER EXPOSED: ICD-10-CM

## 2021-10-13 PROCEDURE — 1160F RVW MEDS BY RX/DR IN RCRD: CPT | Mod: CPTII,S$GLB,, | Performed by: PODIATRIST

## 2021-10-13 PROCEDURE — 3044F HG A1C LEVEL LT 7.0%: CPT | Mod: CPTII,S$GLB,, | Performed by: PODIATRIST

## 2021-10-13 PROCEDURE — 3061F PR NEG MICROALBUMINURIA RESULT DOCUMENTED/REVIEW: ICD-10-PCS | Mod: CPTII,S$GLB,, | Performed by: PODIATRIST

## 2021-10-13 PROCEDURE — 4010F PR ACE/ARB THEARPY RXD/TAKEN: ICD-10-PCS | Mod: CPTII,S$GLB,, | Performed by: PODIATRIST

## 2021-10-13 PROCEDURE — 3077F SYST BP >= 140 MM HG: CPT | Mod: CPTII,S$GLB,, | Performed by: PODIATRIST

## 2021-10-13 PROCEDURE — 1160F PR REVIEW ALL MEDS BY PRESCRIBER/CLIN PHARMACIST DOCUMENTED: ICD-10-PCS | Mod: CPTII,S$GLB,, | Performed by: PODIATRIST

## 2021-10-13 PROCEDURE — 3008F PR BODY MASS INDEX (BMI) DOCUMENTED: ICD-10-PCS | Mod: CPTII,S$GLB,, | Performed by: PODIATRIST

## 2021-10-13 PROCEDURE — 4010F ACE/ARB THERAPY RXD/TAKEN: CPT | Mod: CPTII,S$GLB,, | Performed by: PODIATRIST

## 2021-10-13 PROCEDURE — 73630 X-RAY EXAM OF FOOT: CPT | Mod: TC,LT

## 2021-10-13 PROCEDURE — 11042 DBRDMT SUBQ TIS 1ST 20SQCM/<: CPT | Mod: S$GLB,,, | Performed by: PODIATRIST

## 2021-10-13 PROCEDURE — 3066F NEPHROPATHY DOC TX: CPT | Mod: CPTII,S$GLB,, | Performed by: PODIATRIST

## 2021-10-13 PROCEDURE — 73630 XR FOOT COMPLETE 3 VIEW LEFT: ICD-10-PCS | Mod: 26,LT,, | Performed by: RADIOLOGY

## 2021-10-13 PROCEDURE — 3008F BODY MASS INDEX DOCD: CPT | Mod: CPTII,S$GLB,, | Performed by: PODIATRIST

## 2021-10-13 PROCEDURE — 99999 PR PBB SHADOW E&M-EST. PATIENT-LVL V: CPT | Mod: PBBFAC,,, | Performed by: PODIATRIST

## 2021-10-13 PROCEDURE — 3078F PR MOST RECENT DIASTOLIC BLOOD PRESSURE < 80 MM HG: ICD-10-PCS | Mod: CPTII,S$GLB,, | Performed by: PODIATRIST

## 2021-10-13 PROCEDURE — 3077F PR MOST RECENT SYSTOLIC BLOOD PRESSURE >= 140 MM HG: ICD-10-PCS | Mod: CPTII,S$GLB,, | Performed by: PODIATRIST

## 2021-10-13 PROCEDURE — 99999 PR PBB SHADOW E&M-EST. PATIENT-LVL V: ICD-10-PCS | Mod: PBBFAC,,, | Performed by: PODIATRIST

## 2021-10-13 PROCEDURE — 1125F AMNT PAIN NOTED PAIN PRSNT: CPT | Mod: CPTII,S$GLB,, | Performed by: PODIATRIST

## 2021-10-13 PROCEDURE — 1159F MED LIST DOCD IN RCRD: CPT | Mod: CPTII,S$GLB,, | Performed by: PODIATRIST

## 2021-10-13 PROCEDURE — 11042 PR DEBRIDEMENT, SKIN, SUB-Q TISSUE,=<20 SQ CM: ICD-10-PCS | Mod: S$GLB,,, | Performed by: PODIATRIST

## 2021-10-13 PROCEDURE — 99499 UNLISTED E&M SERVICE: CPT | Mod: S$GLB,,, | Performed by: PODIATRIST

## 2021-10-13 PROCEDURE — 1125F PR PAIN SEVERITY QUANTIFIED, PAIN PRESENT: ICD-10-PCS | Mod: CPTII,S$GLB,, | Performed by: PODIATRIST

## 2021-10-13 PROCEDURE — 73630 X-RAY EXAM OF FOOT: CPT | Mod: 26,LT,, | Performed by: RADIOLOGY

## 2021-10-13 PROCEDURE — 3078F DIAST BP <80 MM HG: CPT | Mod: CPTII,S$GLB,, | Performed by: PODIATRIST

## 2021-10-13 PROCEDURE — 3044F PR MOST RECENT HEMOGLOBIN A1C LEVEL <7.0%: ICD-10-PCS | Mod: CPTII,S$GLB,, | Performed by: PODIATRIST

## 2021-10-13 PROCEDURE — 99499 NO LOS: ICD-10-PCS | Mod: S$GLB,,, | Performed by: PODIATRIST

## 2021-10-13 PROCEDURE — 3061F NEG MICROALBUMINURIA REV: CPT | Mod: CPTII,S$GLB,, | Performed by: PODIATRIST

## 2021-10-13 PROCEDURE — 3066F PR DOCUMENTATION OF TREATMENT FOR NEPHROPATHY: ICD-10-PCS | Mod: CPTII,S$GLB,, | Performed by: PODIATRIST

## 2021-10-13 PROCEDURE — 1159F PR MEDICATION LIST DOCUMENTED IN MEDICAL RECORD: ICD-10-PCS | Mod: CPTII,S$GLB,, | Performed by: PODIATRIST

## 2021-10-13 RX ORDER — TRAMADOL HYDROCHLORIDE 50 MG/1
50 TABLET ORAL EVERY 8 HOURS PRN
Qty: 30 TABLET | Refills: 0 | Status: SHIPPED | OUTPATIENT
Start: 2021-10-13 | End: 2021-10-13

## 2021-10-15 ENCOUNTER — TELEPHONE (OUTPATIENT)
Dept: PODIATRY | Facility: CLINIC | Age: 71
End: 2021-10-15

## 2021-10-19 ENCOUNTER — TELEPHONE (OUTPATIENT)
Dept: PODIATRY | Facility: CLINIC | Age: 71
End: 2021-10-19

## 2021-10-22 ENCOUNTER — IMMUNIZATION (OUTPATIENT)
Dept: URGENT CARE | Facility: CLINIC | Age: 71
End: 2021-10-22
Payer: MEDICARE

## 2021-10-22 PROCEDURE — G0008 ADMIN INFLUENZA VIRUS VAC: HCPCS | Mod: S$GLB,,, | Performed by: NURSE PRACTITIONER

## 2021-10-22 PROCEDURE — 90694 VACC AIIV4 NO PRSRV 0.5ML IM: CPT | Mod: S$GLB,,, | Performed by: NURSE PRACTITIONER

## 2021-10-22 PROCEDURE — G0008 FLU VACCINE - QUADRIVALENT - ADJUVANTED: ICD-10-PCS | Mod: S$GLB,,, | Performed by: NURSE PRACTITIONER

## 2021-10-22 PROCEDURE — 90694 FLU VACCINE - QUADRIVALENT - ADJUVANTED: ICD-10-PCS | Mod: S$GLB,,, | Performed by: NURSE PRACTITIONER

## 2021-10-25 ENCOUNTER — HOSPITAL ENCOUNTER (OUTPATIENT)
Dept: WOUND CARE | Facility: HOSPITAL | Age: 71
Discharge: HOME | End: 2021-10-25
Attending: PODIATRIST
Payer: MEDICARE

## 2021-10-25 VITALS
DIASTOLIC BLOOD PRESSURE: 57 MMHG | SYSTOLIC BLOOD PRESSURE: 120 MMHG | HEART RATE: 70 BPM | HEIGHT: 65 IN | TEMPERATURE: 99 F | WEIGHT: 293 LBS | BODY MASS INDEX: 48.82 KG/M2

## 2021-10-25 DIAGNOSIS — E08.43 DIABETIC AUTONOMIC NEUROPATHY ASSOCIATED WITH DIABETES MELLITUS DUE TO UNDERLYING CONDITION: ICD-10-CM

## 2021-10-25 DIAGNOSIS — L97.522 ULCER OF TOE, LEFT, WITH FAT LAYER EXPOSED: Primary | ICD-10-CM

## 2021-10-25 DIAGNOSIS — M86.179 OTHER ACUTE OSTEOMYELITIS, UNSPECIFIED ANKLE AND FOOT: ICD-10-CM

## 2021-10-25 DIAGNOSIS — E08.42 DIABETES MELLITUS DUE TO UNDERLYING CONDITION WITH DIABETIC POLYNEUROPATHY: ICD-10-CM

## 2021-10-25 PROCEDURE — 99213 OFFICE O/P EST LOW 20 MIN: CPT

## 2021-10-28 ENCOUNTER — TELEPHONE (OUTPATIENT)
Dept: PODIATRY | Facility: CLINIC | Age: 71
End: 2021-10-28
Payer: MEDICARE

## 2021-10-28 ENCOUNTER — OFFICE VISIT (OUTPATIENT)
Dept: PODIATRY | Facility: CLINIC | Age: 71
End: 2021-10-28
Payer: MEDICARE

## 2021-10-28 VITALS
SYSTOLIC BLOOD PRESSURE: 131 MMHG | DIASTOLIC BLOOD PRESSURE: 59 MMHG | BODY MASS INDEX: 50.59 KG/M2 | WEIGHT: 293 LBS | HEART RATE: 70 BPM

## 2021-10-28 DIAGNOSIS — M86.9 OSTEOMYELITIS OF TOE: ICD-10-CM

## 2021-10-28 DIAGNOSIS — E08.43 DIABETIC AUTONOMIC NEUROPATHY ASSOCIATED WITH DIABETES MELLITUS DUE TO UNDERLYING CONDITION: ICD-10-CM

## 2021-10-28 DIAGNOSIS — L97.522 ULCER OF TOE, LEFT, WITH FAT LAYER EXPOSED: Primary | ICD-10-CM

## 2021-10-28 PROCEDURE — 99999 PR PBB SHADOW E&M-EST. PATIENT-LVL IV: ICD-10-PCS | Mod: PBBFAC,,, | Performed by: PODIATRIST

## 2021-10-28 PROCEDURE — 3044F PR MOST RECENT HEMOGLOBIN A1C LEVEL <7.0%: ICD-10-PCS | Mod: CPTII,S$GLB,, | Performed by: PODIATRIST

## 2021-10-28 PROCEDURE — 3008F BODY MASS INDEX DOCD: CPT | Mod: CPTII,S$GLB,, | Performed by: PODIATRIST

## 2021-10-28 PROCEDURE — 3066F PR DOCUMENTATION OF TREATMENT FOR NEPHROPATHY: ICD-10-PCS | Mod: CPTII,S$GLB,, | Performed by: PODIATRIST

## 2021-10-28 PROCEDURE — 3061F NEG MICROALBUMINURIA REV: CPT | Mod: CPTII,S$GLB,, | Performed by: PODIATRIST

## 2021-10-28 PROCEDURE — 1125F AMNT PAIN NOTED PAIN PRSNT: CPT | Mod: CPTII,S$GLB,, | Performed by: PODIATRIST

## 2021-10-28 PROCEDURE — 99213 OFFICE O/P EST LOW 20 MIN: CPT | Mod: S$GLB,,, | Performed by: PODIATRIST

## 2021-10-28 PROCEDURE — 99999 PR PBB SHADOW E&M-EST. PATIENT-LVL IV: CPT | Mod: PBBFAC,,, | Performed by: PODIATRIST

## 2021-10-28 PROCEDURE — 99213 PR OFFICE/OUTPT VISIT, EST, LEVL III, 20-29 MIN: ICD-10-PCS | Mod: S$GLB,,, | Performed by: PODIATRIST

## 2021-10-28 PROCEDURE — 3075F SYST BP GE 130 - 139MM HG: CPT | Mod: CPTII,S$GLB,, | Performed by: PODIATRIST

## 2021-10-28 PROCEDURE — 3066F NEPHROPATHY DOC TX: CPT | Mod: CPTII,S$GLB,, | Performed by: PODIATRIST

## 2021-10-28 PROCEDURE — 1125F PR PAIN SEVERITY QUANTIFIED, PAIN PRESENT: ICD-10-PCS | Mod: CPTII,S$GLB,, | Performed by: PODIATRIST

## 2021-10-28 PROCEDURE — 3078F PR MOST RECENT DIASTOLIC BLOOD PRESSURE < 80 MM HG: ICD-10-PCS | Mod: CPTII,S$GLB,, | Performed by: PODIATRIST

## 2021-10-28 PROCEDURE — 3078F DIAST BP <80 MM HG: CPT | Mod: CPTII,S$GLB,, | Performed by: PODIATRIST

## 2021-10-28 PROCEDURE — 3061F PR NEG MICROALBUMINURIA RESULT DOCUMENTED/REVIEW: ICD-10-PCS | Mod: CPTII,S$GLB,, | Performed by: PODIATRIST

## 2021-10-28 PROCEDURE — 3075F PR MOST RECENT SYSTOLIC BLOOD PRESS GE 130-139MM HG: ICD-10-PCS | Mod: CPTII,S$GLB,, | Performed by: PODIATRIST

## 2021-10-28 PROCEDURE — 3008F PR BODY MASS INDEX (BMI) DOCUMENTED: ICD-10-PCS | Mod: CPTII,S$GLB,, | Performed by: PODIATRIST

## 2021-10-28 PROCEDURE — 3044F HG A1C LEVEL LT 7.0%: CPT | Mod: CPTII,S$GLB,, | Performed by: PODIATRIST

## 2021-10-28 PROCEDURE — 4010F ACE/ARB THERAPY RXD/TAKEN: CPT | Mod: CPTII,S$GLB,, | Performed by: PODIATRIST

## 2021-10-28 PROCEDURE — 4010F PR ACE/ARB THEARPY RXD/TAKEN: ICD-10-PCS | Mod: CPTII,S$GLB,, | Performed by: PODIATRIST

## 2021-11-01 ENCOUNTER — TELEPHONE (OUTPATIENT)
Dept: INTERNAL MEDICINE | Facility: CLINIC | Age: 71
End: 2021-11-01
Payer: MEDICARE

## 2021-11-01 DIAGNOSIS — Z12.31 ENCOUNTER FOR SCREENING MAMMOGRAM FOR BREAST CANCER: Primary | ICD-10-CM

## 2021-11-01 DIAGNOSIS — Z12.31 ENCOUNTER FOR SCREENING MAMMOGRAM FOR MALIGNANT NEOPLASM OF BREAST: ICD-10-CM

## 2021-11-03 ENCOUNTER — HOSPITAL ENCOUNTER (EMERGENCY)
Facility: HOSPITAL | Age: 71
Discharge: HOME | End: 2021-11-03
Attending: EMERGENCY MEDICINE
Payer: MEDICARE

## 2021-11-03 ENCOUNTER — TELEPHONE (OUTPATIENT)
Dept: PODIATRY | Facility: CLINIC | Age: 71
End: 2021-11-03
Payer: MEDICARE

## 2021-11-03 VITALS
OXYGEN SATURATION: 98 % | BODY MASS INDEX: 48.82 KG/M2 | SYSTOLIC BLOOD PRESSURE: 125 MMHG | HEART RATE: 70 BPM | TEMPERATURE: 99 F | DIASTOLIC BLOOD PRESSURE: 80 MMHG | HEIGHT: 65 IN | RESPIRATION RATE: 20 BRPM | WEIGHT: 293 LBS

## 2021-11-03 DIAGNOSIS — L97.529 SKIN ULCER OF SECOND TOE, LEFT, WITH UNSPECIFIED SEVERITY: Primary | ICD-10-CM

## 2021-11-03 DIAGNOSIS — E11.621 TYPE 2 DIABETES MELLITUS WITH FOOT ULCER, UNSPECIFIED WHETHER LONG TERM INSULIN USE: ICD-10-CM

## 2021-11-03 DIAGNOSIS — L97.509 TYPE 2 DIABETES MELLITUS WITH FOOT ULCER, UNSPECIFIED WHETHER LONG TERM INSULIN USE: ICD-10-CM

## 2021-11-03 LAB
ANION GAP SERPL CALC-SCNC: 6 MMOL/L (ref 8–16)
BASOPHILS # BLD AUTO: 0.04 K/UL (ref 0–0.2)
BASOPHILS NFR BLD: 0.5 % (ref 0–1.9)
BUN SERPL-MCNC: 25 MG/DL (ref 8–23)
CALCIUM SERPL-MCNC: 10.1 MG/DL (ref 8.7–10.5)
CHLORIDE SERPL-SCNC: 110 MMOL/L (ref 95–110)
CO2 SERPL-SCNC: 27 MMOL/L (ref 23–29)
CREAT SERPL-MCNC: 1.5 MG/DL (ref 0.5–1.4)
DIFFERENTIAL METHOD: ABNORMAL
EOSINOPHIL # BLD AUTO: 0.5 K/UL (ref 0–0.5)
EOSINOPHIL NFR BLD: 6.3 % (ref 0–8)
ERYTHROCYTE [DISTWIDTH] IN BLOOD BY AUTOMATED COUNT: 15.5 % (ref 11.5–14.5)
EST. GFR  (AFRICAN AMERICAN): 40.1 ML/MIN/1.73 M^2
EST. GFR  (NON AFRICAN AMERICAN): 34.8 ML/MIN/1.73 M^2
GLUCOSE SERPL-MCNC: 82 MG/DL (ref 70–110)
HCT VFR BLD AUTO: 30.9 % (ref 37–48.5)
HGB BLD-MCNC: 9.7 G/DL (ref 12–16)
IMM GRANULOCYTES # BLD AUTO: 0.03 K/UL (ref 0–0.04)
IMM GRANULOCYTES NFR BLD AUTO: 0.4 % (ref 0–0.5)
LYMPHOCYTES # BLD AUTO: 2.3 K/UL (ref 1–4.8)
LYMPHOCYTES NFR BLD: 28.2 % (ref 18–48)
MCH RBC QN AUTO: 29 PG (ref 27–31)
MCHC RBC AUTO-ENTMCNC: 31.4 G/DL (ref 32–36)
MCV RBC AUTO: 93 FL (ref 82–98)
MONOCYTES # BLD AUTO: 0.8 K/UL (ref 0.3–1)
MONOCYTES NFR BLD: 10 % (ref 4–15)
NEUTROPHILS # BLD AUTO: 4.5 K/UL (ref 1.8–7.7)
NEUTROPHILS NFR BLD: 54.6 % (ref 38–73)
NRBC BLD-RTO: 0 /100 WBC
PLATELET # BLD AUTO: 249 K/UL (ref 150–450)
PMV BLD AUTO: 10.5 FL (ref 9.2–12.9)
POTASSIUM SERPL-SCNC: 4.5 MMOL/L (ref 3.5–5.1)
RBC # BLD AUTO: 3.34 M/UL (ref 4–5.4)
SODIUM SERPL-SCNC: 143 MMOL/L (ref 136–145)
WBC # BLD AUTO: 8.22 K/UL (ref 3.9–12.7)

## 2021-11-03 PROCEDURE — 99285 PR EMERGENCY DEPT VISIT,LEVEL V: ICD-10-PCS | Mod: ,,, | Performed by: EMERGENCY MEDICINE

## 2021-11-03 PROCEDURE — 99283 EMERGENCY DEPT VISIT LOW MDM: CPT

## 2021-11-03 PROCEDURE — 99285 EMERGENCY DEPT VISIT HI MDM: CPT | Mod: ,,, | Performed by: EMERGENCY MEDICINE

## 2021-11-03 PROCEDURE — 85025 COMPLETE CBC W/AUTO DIFF WBC: CPT | Performed by: EMERGENCY MEDICINE

## 2021-11-03 PROCEDURE — 80048 BASIC METABOLIC PNL TOTAL CA: CPT | Performed by: EMERGENCY MEDICINE

## 2021-11-08 ENCOUNTER — HOSPITAL ENCOUNTER (OUTPATIENT)
Dept: RADIOLOGY | Facility: HOSPITAL | Age: 71
Discharge: HOME | End: 2021-11-08
Attending: PREVENTIVE MEDICINE
Payer: MEDICARE

## 2021-11-08 ENCOUNTER — CLINICAL SUPPORT (OUTPATIENT)
Dept: LAB | Facility: HOSPITAL | Age: 71
End: 2021-11-08
Attending: PREVENTIVE MEDICINE
Payer: MEDICARE

## 2021-11-08 DIAGNOSIS — L97.522 ULCER OF TOE, LEFT, WITH FAT LAYER EXPOSED: ICD-10-CM

## 2021-11-08 DIAGNOSIS — M86.179 OTHER ACUTE OSTEOMYELITIS, UNSPECIFIED ANKLE AND FOOT: ICD-10-CM

## 2021-11-08 PROCEDURE — 93925 LOWER EXTREMITY STUDY: CPT | Mod: TC

## 2021-11-08 PROCEDURE — 93010 ELECTROCARDIOGRAM REPORT: CPT | Mod: ,,, | Performed by: INTERNAL MEDICINE

## 2021-11-08 PROCEDURE — 93005 ELECTROCARDIOGRAM TRACING: CPT

## 2021-11-08 PROCEDURE — 73718 MRI FOOT TOES WITHOUT CONTRAST LEFT: ICD-10-PCS | Mod: 26,LT,, | Performed by: RADIOLOGY

## 2021-11-08 PROCEDURE — 93925 LOWER EXTREMITY STUDY: CPT | Mod: 26,,, | Performed by: RADIOLOGY

## 2021-11-08 PROCEDURE — 93925 US LOWER EXTREMITY ARTERIES BILATERAL: ICD-10-PCS | Mod: 26,,, | Performed by: RADIOLOGY

## 2021-11-08 PROCEDURE — 71046 XR CHEST PA AND LATERAL: ICD-10-PCS | Mod: 26,,, | Performed by: RADIOLOGY

## 2021-11-08 PROCEDURE — 71046 X-RAY EXAM CHEST 2 VIEWS: CPT | Mod: 26,,, | Performed by: RADIOLOGY

## 2021-11-08 PROCEDURE — 73718 MRI LOWER EXTREMITY W/O DYE: CPT | Mod: 26,LT,, | Performed by: RADIOLOGY

## 2021-11-08 PROCEDURE — 93010 EKG 12-LEAD: ICD-10-PCS | Mod: ,,, | Performed by: INTERNAL MEDICINE

## 2021-11-08 PROCEDURE — 73718 MRI LOWER EXTREMITY W/O DYE: CPT | Mod: TC,LT

## 2021-11-08 PROCEDURE — 71046 X-RAY EXAM CHEST 2 VIEWS: CPT | Mod: TC,FY

## 2021-11-11 ENCOUNTER — PATIENT OUTREACH (OUTPATIENT)
Dept: ADMINISTRATIVE | Facility: OTHER | Age: 71
End: 2021-11-11
Payer: MEDICARE

## 2021-11-12 DIAGNOSIS — M86.10 ACUTE OSTEOMYELITIS: Primary | ICD-10-CM

## 2021-11-15 ENCOUNTER — OFFICE VISIT (OUTPATIENT)
Dept: PODIATRY | Facility: CLINIC | Age: 71
End: 2021-11-15
Payer: MEDICARE

## 2021-11-15 VITALS
RESPIRATION RATE: 18 BRPM | DIASTOLIC BLOOD PRESSURE: 67 MMHG | SYSTOLIC BLOOD PRESSURE: 148 MMHG | BODY MASS INDEX: 48.82 KG/M2 | HEART RATE: 76 BPM | WEIGHT: 293 LBS | HEIGHT: 65 IN

## 2021-11-15 DIAGNOSIS — L97.522 ULCER OF TOE, LEFT, WITH FAT LAYER EXPOSED: ICD-10-CM

## 2021-11-15 DIAGNOSIS — M86.9 OSTEOMYELITIS OF TOE: Primary | ICD-10-CM

## 2021-11-15 PROCEDURE — 3061F PR NEG MICROALBUMINURIA RESULT DOCUMENTED/REVIEW: ICD-10-PCS | Mod: CPTII,S$GLB,, | Performed by: PODIATRIST

## 2021-11-15 PROCEDURE — 1159F PR MEDICATION LIST DOCUMENTED IN MEDICAL RECORD: ICD-10-PCS | Mod: CPTII,S$GLB,, | Performed by: PODIATRIST

## 2021-11-15 PROCEDURE — 99999 PR PBB SHADOW E&M-EST. PATIENT-LVL V: ICD-10-PCS | Mod: PBBFAC,,, | Performed by: PODIATRIST

## 2021-11-15 PROCEDURE — 11042 PR DEBRIDEMENT, SKIN, SUB-Q TISSUE,=<20 SQ CM: ICD-10-PCS | Mod: S$GLB,,, | Performed by: PODIATRIST

## 2021-11-15 PROCEDURE — 1126F AMNT PAIN NOTED NONE PRSNT: CPT | Mod: CPTII,S$GLB,, | Performed by: PODIATRIST

## 2021-11-15 PROCEDURE — 11042 DBRDMT SUBQ TIS 1ST 20SQCM/<: CPT | Mod: S$GLB,,, | Performed by: PODIATRIST

## 2021-11-15 PROCEDURE — 3008F PR BODY MASS INDEX (BMI) DOCUMENTED: ICD-10-PCS | Mod: CPTII,S$GLB,, | Performed by: PODIATRIST

## 2021-11-15 PROCEDURE — 4010F ACE/ARB THERAPY RXD/TAKEN: CPT | Mod: CPTII,S$GLB,, | Performed by: PODIATRIST

## 2021-11-15 PROCEDURE — 3008F BODY MASS INDEX DOCD: CPT | Mod: CPTII,S$GLB,, | Performed by: PODIATRIST

## 2021-11-15 PROCEDURE — 3066F NEPHROPATHY DOC TX: CPT | Mod: CPTII,S$GLB,, | Performed by: PODIATRIST

## 2021-11-15 PROCEDURE — 3061F NEG MICROALBUMINURIA REV: CPT | Mod: CPTII,S$GLB,, | Performed by: PODIATRIST

## 2021-11-15 PROCEDURE — 3066F PR DOCUMENTATION OF TREATMENT FOR NEPHROPATHY: ICD-10-PCS | Mod: CPTII,S$GLB,, | Performed by: PODIATRIST

## 2021-11-15 PROCEDURE — 99999 PR PBB SHADOW E&M-EST. PATIENT-LVL V: CPT | Mod: PBBFAC,,, | Performed by: PODIATRIST

## 2021-11-15 PROCEDURE — 3078F PR MOST RECENT DIASTOLIC BLOOD PRESSURE < 80 MM HG: ICD-10-PCS | Mod: CPTII,S$GLB,, | Performed by: PODIATRIST

## 2021-11-15 PROCEDURE — 3051F PR MOST RECENT HEMOGLOBIN A1C LEVEL 7.0 - < 8.0%: ICD-10-PCS | Mod: CPTII,S$GLB,, | Performed by: PODIATRIST

## 2021-11-15 PROCEDURE — 1159F MED LIST DOCD IN RCRD: CPT | Mod: CPTII,S$GLB,, | Performed by: PODIATRIST

## 2021-11-15 PROCEDURE — 1126F PR PAIN SEVERITY QUANTIFIED, NO PAIN PRESENT: ICD-10-PCS | Mod: CPTII,S$GLB,, | Performed by: PODIATRIST

## 2021-11-15 PROCEDURE — 4010F PR ACE/ARB THEARPY RXD/TAKEN: ICD-10-PCS | Mod: CPTII,S$GLB,, | Performed by: PODIATRIST

## 2021-11-15 PROCEDURE — 3077F PR MOST RECENT SYSTOLIC BLOOD PRESSURE >= 140 MM HG: ICD-10-PCS | Mod: CPTII,S$GLB,, | Performed by: PODIATRIST

## 2021-11-15 PROCEDURE — 99213 OFFICE O/P EST LOW 20 MIN: CPT | Mod: 25,S$GLB,, | Performed by: PODIATRIST

## 2021-11-15 PROCEDURE — 3051F HG A1C>EQUAL 7.0%<8.0%: CPT | Mod: CPTII,S$GLB,, | Performed by: PODIATRIST

## 2021-11-15 PROCEDURE — 3077F SYST BP >= 140 MM HG: CPT | Mod: CPTII,S$GLB,, | Performed by: PODIATRIST

## 2021-11-15 PROCEDURE — 3078F DIAST BP <80 MM HG: CPT | Mod: CPTII,S$GLB,, | Performed by: PODIATRIST

## 2021-11-15 PROCEDURE — 99213 PR OFFICE/OUTPT VISIT, EST, LEVL III, 20-29 MIN: ICD-10-PCS | Mod: 25,S$GLB,, | Performed by: PODIATRIST

## 2021-11-16 ENCOUNTER — TELEPHONE (OUTPATIENT)
Dept: PODIATRY | Facility: CLINIC | Age: 71
End: 2021-11-16
Payer: MEDICARE

## 2021-11-23 ENCOUNTER — TELEPHONE (OUTPATIENT)
Dept: PODIATRY | Facility: CLINIC | Age: 71
End: 2021-11-23
Payer: MEDICARE

## 2021-11-23 ENCOUNTER — PROCEDURE VISIT (OUTPATIENT)
Dept: PODIATRY | Facility: CLINIC | Age: 71
End: 2021-11-23
Payer: MEDICARE

## 2021-11-23 VITALS
WEIGHT: 293 LBS | DIASTOLIC BLOOD PRESSURE: 67 MMHG | HEART RATE: 66 BPM | SYSTOLIC BLOOD PRESSURE: 130 MMHG | BODY MASS INDEX: 49.09 KG/M2

## 2021-11-23 DIAGNOSIS — M86.9 OSTEOMYELITIS OF TOE: Primary | ICD-10-CM

## 2021-11-23 DIAGNOSIS — L97.522 ULCER OF TOE, LEFT, WITH FAT LAYER EXPOSED: ICD-10-CM

## 2021-11-23 LAB
GRAM STN SPEC: NORMAL
GRAM STN SPEC: NORMAL

## 2021-11-23 PROCEDURE — 87186 SC STD MICRODIL/AGAR DIL: CPT | Performed by: STUDENT IN AN ORGANIZED HEALTH CARE EDUCATION/TRAINING PROGRAM

## 2021-11-23 PROCEDURE — 88307 TISSUE EXAM BY PATHOLOGIST: CPT | Mod: 26,,, | Performed by: PATHOLOGY

## 2021-11-23 PROCEDURE — 88307 PR  SURG PATH,LEVEL V: ICD-10-PCS | Mod: 26,,, | Performed by: PATHOLOGY

## 2021-11-23 PROCEDURE — 88307 TISSUE EXAM BY PATHOLOGIST: CPT | Performed by: PATHOLOGY

## 2021-11-23 PROCEDURE — 88311 PR  DECALCIFY TISSUE: ICD-10-PCS | Mod: 26,,, | Performed by: PATHOLOGY

## 2021-11-23 PROCEDURE — 87102 FUNGUS ISOLATION CULTURE: CPT | Performed by: STUDENT IN AN ORGANIZED HEALTH CARE EDUCATION/TRAINING PROGRAM

## 2021-11-23 PROCEDURE — 88311 DECALCIFY TISSUE: CPT | Mod: 26,,, | Performed by: PATHOLOGY

## 2021-11-23 PROCEDURE — 87077 CULTURE AEROBIC IDENTIFY: CPT | Performed by: STUDENT IN AN ORGANIZED HEALTH CARE EDUCATION/TRAINING PROGRAM

## 2021-11-23 PROCEDURE — 87075 CULTR BACTERIA EXCEPT BLOOD: CPT | Performed by: STUDENT IN AN ORGANIZED HEALTH CARE EDUCATION/TRAINING PROGRAM

## 2021-11-23 PROCEDURE — 20220 BONE BIOPSY TROCAR/NDL SUPFC: CPT | Mod: S$GLB,,, | Performed by: PODIATRIST

## 2021-11-23 PROCEDURE — 87205 SMEAR GRAM STAIN: CPT | Performed by: STUDENT IN AN ORGANIZED HEALTH CARE EDUCATION/TRAINING PROGRAM

## 2021-11-23 PROCEDURE — 20220 PR BONE BIOPSY,TROCAR/NEEDLE SUPERF: ICD-10-PCS | Mod: S$GLB,,, | Performed by: PODIATRIST

## 2021-11-23 PROCEDURE — 87070 CULTURE OTHR SPECIMN AEROBIC: CPT | Performed by: STUDENT IN AN ORGANIZED HEALTH CARE EDUCATION/TRAINING PROGRAM

## 2021-11-24 ENCOUNTER — TELEPHONE (OUTPATIENT)
Dept: PODIATRY | Facility: CLINIC | Age: 71
End: 2021-11-24
Payer: MEDICARE

## 2021-11-26 LAB — BACTERIA SPEC ANAEROBE CULT: NORMAL

## 2021-11-27 LAB — BACTERIA SPEC AEROBE CULT: ABNORMAL

## 2021-12-01 ENCOUNTER — HOSPITAL ENCOUNTER (OUTPATIENT)
Dept: RADIOLOGY | Facility: HOSPITAL | Age: 71
Discharge: HOME | End: 2021-12-01
Attending: INTERNAL MEDICINE
Payer: MEDICARE

## 2021-12-01 DIAGNOSIS — Z12.31 ENCOUNTER FOR SCREENING MAMMOGRAM FOR BREAST CANCER: ICD-10-CM

## 2021-12-01 PROCEDURE — 77067 MAMMO DIGITAL SCREENING BILAT WITH TOMO: ICD-10-PCS | Mod: 26,,, | Performed by: RADIOLOGY

## 2021-12-01 PROCEDURE — 77067 SCR MAMMO BI INCL CAD: CPT | Mod: 26,,, | Performed by: RADIOLOGY

## 2021-12-01 PROCEDURE — 77067 SCR MAMMO BI INCL CAD: CPT | Mod: TC

## 2021-12-01 PROCEDURE — 77063 BREAST TOMOSYNTHESIS BI: CPT | Mod: 26,,, | Performed by: RADIOLOGY

## 2021-12-01 PROCEDURE — 77063 MAMMO DIGITAL SCREENING BILAT WITH TOMO: ICD-10-PCS | Mod: 26,,, | Performed by: RADIOLOGY

## 2021-12-02 LAB
FINAL PATHOLOGIC DIAGNOSIS: NORMAL
Lab: NORMAL

## 2021-12-03 ENCOUNTER — OFFICE VISIT (OUTPATIENT)
Dept: INFECTIOUS DISEASES | Facility: CLINIC | Age: 71
End: 2021-12-03
Payer: MEDICARE

## 2021-12-03 ENCOUNTER — HOSPITAL ENCOUNTER (OUTPATIENT)
Dept: RADIOLOGY | Facility: HOSPITAL | Age: 71
Discharge: HOME | End: 2021-12-03
Attending: INTERNAL MEDICINE
Payer: MEDICARE

## 2021-12-03 VITALS
DIASTOLIC BLOOD PRESSURE: 72 MMHG | HEART RATE: 78 BPM | TEMPERATURE: 98 F | WEIGHT: 293 LBS | BODY MASS INDEX: 48.82 KG/M2 | SYSTOLIC BLOOD PRESSURE: 134 MMHG | HEIGHT: 65 IN

## 2021-12-03 DIAGNOSIS — I12.9 TYPE 2 DM WITH CKD STAGE 4 AND HYPERTENSION: ICD-10-CM

## 2021-12-03 DIAGNOSIS — M86.10 ACUTE OSTEOMYELITIS: ICD-10-CM

## 2021-12-03 DIAGNOSIS — N18.4 TYPE 2 DM WITH CKD STAGE 4 AND HYPERTENSION: ICD-10-CM

## 2021-12-03 DIAGNOSIS — M86.9 OSTEOMYELITIS OF TOE: ICD-10-CM

## 2021-12-03 DIAGNOSIS — L97.522 ULCER OF TOE, LEFT, WITH FAT LAYER EXPOSED: ICD-10-CM

## 2021-12-03 DIAGNOSIS — E11.22 TYPE 2 DM WITH CKD STAGE 4 AND HYPERTENSION: ICD-10-CM

## 2021-12-03 DIAGNOSIS — M86.9 OSTEOMYELITIS, UNSPECIFIED SITE, UNSPECIFIED TYPE: ICD-10-CM

## 2021-12-03 PROCEDURE — 99204 OFFICE O/P NEW MOD 45 MIN: CPT | Mod: S$GLB,,, | Performed by: INTERNAL MEDICINE

## 2021-12-03 PROCEDURE — 4010F ACE/ARB THERAPY RXD/TAKEN: CPT | Mod: CPTII,S$GLB,, | Performed by: INTERNAL MEDICINE

## 2021-12-03 PROCEDURE — 99204 PR OFFICE/OUTPT VISIT, NEW, LEVL IV, 45-59 MIN: ICD-10-PCS | Mod: S$GLB,,, | Performed by: INTERNAL MEDICINE

## 2021-12-03 PROCEDURE — 71045 X-RAY EXAM CHEST 1 VIEW: CPT | Mod: 26,,, | Performed by: RADIOLOGY

## 2021-12-03 PROCEDURE — 99999 PR PBB SHADOW E&M-EST. PATIENT-LVL IV: CPT | Mod: PBBFAC,,, | Performed by: INTERNAL MEDICINE

## 2021-12-03 PROCEDURE — 3066F PR DOCUMENTATION OF TREATMENT FOR NEPHROPATHY: ICD-10-PCS | Mod: CPTII,S$GLB,, | Performed by: INTERNAL MEDICINE

## 2021-12-03 PROCEDURE — 3061F PR NEG MICROALBUMINURIA RESULT DOCUMENTED/REVIEW: ICD-10-PCS | Mod: CPTII,S$GLB,, | Performed by: INTERNAL MEDICINE

## 2021-12-03 PROCEDURE — 99999 PR PBB SHADOW E&M-EST. PATIENT-LVL IV: ICD-10-PCS | Mod: PBBFAC,,, | Performed by: INTERNAL MEDICINE

## 2021-12-03 PROCEDURE — 3066F NEPHROPATHY DOC TX: CPT | Mod: CPTII,S$GLB,, | Performed by: INTERNAL MEDICINE

## 2021-12-03 PROCEDURE — 71045 XR CHEST 1 VIEW S/P PICC LINE BY NURSING: ICD-10-PCS | Mod: 26,,, | Performed by: RADIOLOGY

## 2021-12-03 PROCEDURE — 3061F NEG MICROALBUMINURIA REV: CPT | Mod: CPTII,S$GLB,, | Performed by: INTERNAL MEDICINE

## 2021-12-03 PROCEDURE — 4010F PR ACE/ARB THEARPY RXD/TAKEN: ICD-10-PCS | Mod: CPTII,S$GLB,, | Performed by: INTERNAL MEDICINE

## 2021-12-06 NOTE — TELEPHONE ENCOUNTER
----- Message from Rubio Nava sent at 7/16/2019  9:07 AM CDT -----  Contact: Patient   Patient would like the office to call [facility] to get 3 pumps of her insulins to save her the time of going there every other day     [phone]    Thank you    None

## 2021-12-08 ENCOUNTER — OFFICE VISIT (OUTPATIENT)
Dept: PODIATRY | Facility: CLINIC | Age: 71
End: 2021-12-08
Payer: MEDICARE

## 2021-12-08 VITALS
WEIGHT: 293 LBS | BODY MASS INDEX: 50.77 KG/M2 | SYSTOLIC BLOOD PRESSURE: 147 MMHG | HEART RATE: 80 BPM | DIASTOLIC BLOOD PRESSURE: 75 MMHG

## 2021-12-08 DIAGNOSIS — E08.43 DIABETIC AUTONOMIC NEUROPATHY ASSOCIATED WITH DIABETES MELLITUS DUE TO UNDERLYING CONDITION: ICD-10-CM

## 2021-12-08 DIAGNOSIS — M86.9 OSTEOMYELITIS OF TOE: Primary | ICD-10-CM

## 2021-12-08 DIAGNOSIS — L97.522 ULCER OF TOE, LEFT, WITH FAT LAYER EXPOSED: ICD-10-CM

## 2021-12-08 PROCEDURE — 3066F PR DOCUMENTATION OF TREATMENT FOR NEPHROPATHY: ICD-10-PCS | Mod: CPTII,S$GLB,, | Performed by: PODIATRIST

## 2021-12-08 PROCEDURE — 3061F NEG MICROALBUMINURIA REV: CPT | Mod: CPTII,S$GLB,, | Performed by: PODIATRIST

## 2021-12-08 PROCEDURE — 4010F PR ACE/ARB THEARPY RXD/TAKEN: ICD-10-PCS | Mod: CPTII,S$GLB,, | Performed by: PODIATRIST

## 2021-12-08 PROCEDURE — 99999 PR PBB SHADOW E&M-EST. PATIENT-LVL IV: ICD-10-PCS | Mod: PBBFAC,,, | Performed by: PODIATRIST

## 2021-12-08 PROCEDURE — 11042 PR DEBRIDEMENT, SKIN, SUB-Q TISSUE,=<20 SQ CM: ICD-10-PCS | Mod: S$GLB,,, | Performed by: PODIATRIST

## 2021-12-08 PROCEDURE — 3066F NEPHROPATHY DOC TX: CPT | Mod: CPTII,S$GLB,, | Performed by: PODIATRIST

## 2021-12-08 PROCEDURE — 99999 PR PBB SHADOW E&M-EST. PATIENT-LVL IV: CPT | Mod: PBBFAC,,, | Performed by: PODIATRIST

## 2021-12-08 PROCEDURE — 11042 DBRDMT SUBQ TIS 1ST 20SQCM/<: CPT | Mod: S$GLB,,, | Performed by: PODIATRIST

## 2021-12-08 PROCEDURE — 3061F PR NEG MICROALBUMINURIA RESULT DOCUMENTED/REVIEW: ICD-10-PCS | Mod: CPTII,S$GLB,, | Performed by: PODIATRIST

## 2021-12-08 PROCEDURE — 99213 PR OFFICE/OUTPT VISIT, EST, LEVL III, 20-29 MIN: ICD-10-PCS | Mod: 25,S$GLB,, | Performed by: PODIATRIST

## 2021-12-08 PROCEDURE — 4010F ACE/ARB THERAPY RXD/TAKEN: CPT | Mod: CPTII,S$GLB,, | Performed by: PODIATRIST

## 2021-12-08 PROCEDURE — 99213 OFFICE O/P EST LOW 20 MIN: CPT | Mod: 25,S$GLB,, | Performed by: PODIATRIST

## 2021-12-08 RX ORDER — PIPERACILLIN SODIUM, TAZOBACTAM SODIUM 36; 4.5 G/152ML; G/152ML
INJECTION, POWDER, LYOPHILIZED, FOR SOLUTION INTRAVENOUS
COMMUNITY
Start: 2021-12-03 | End: 2022-01-21 | Stop reason: ALTCHOICE

## 2021-12-13 ENCOUNTER — HOSPITAL ENCOUNTER (OUTPATIENT)
Dept: WOUND CARE | Facility: HOSPITAL | Age: 71
Discharge: HOME | End: 2021-12-13
Attending: PREVENTIVE MEDICINE
Payer: MEDICARE

## 2021-12-13 VITALS
BODY MASS INDEX: 48.82 KG/M2 | WEIGHT: 293 LBS | HEIGHT: 65 IN | HEART RATE: 76 BPM | SYSTOLIC BLOOD PRESSURE: 155 MMHG | TEMPERATURE: 98 F | DIASTOLIC BLOOD PRESSURE: 66 MMHG

## 2021-12-13 DIAGNOSIS — L97.522 ULCER OF TOE, LEFT, WITH FAT LAYER EXPOSED: ICD-10-CM

## 2021-12-13 DIAGNOSIS — E08.43 DIABETIC AUTONOMIC NEUROPATHY ASSOCIATED WITH DIABETES MELLITUS DUE TO UNDERLYING CONDITION: ICD-10-CM

## 2021-12-13 DIAGNOSIS — M86.10 ACUTE OSTEOMYELITIS: Primary | ICD-10-CM

## 2021-12-13 PROCEDURE — 99213 OFFICE O/P EST LOW 20 MIN: CPT

## 2021-12-15 ENCOUNTER — HOSPITAL ENCOUNTER (OUTPATIENT)
Dept: WOUND CARE | Facility: HOSPITAL | Age: 71
Discharge: HOME | End: 2021-12-15
Attending: PREVENTIVE MEDICINE
Payer: MEDICARE

## 2021-12-15 DIAGNOSIS — L97.522 ULCER OF LEFT FOOT, WITH FAT LAYER EXPOSED: ICD-10-CM

## 2021-12-15 DIAGNOSIS — L97.509 DIABETIC FOOT ULCER WITH OSTEOMYELITIS: ICD-10-CM

## 2021-12-15 DIAGNOSIS — E11.69 DIABETIC FOOT ULCER WITH OSTEOMYELITIS: ICD-10-CM

## 2021-12-15 DIAGNOSIS — E11.621 DIABETIC FOOT ULCER WITH OSTEOMYELITIS: ICD-10-CM

## 2021-12-15 DIAGNOSIS — M86.9 DIABETIC FOOT ULCER WITH OSTEOMYELITIS: ICD-10-CM

## 2021-12-15 DIAGNOSIS — M86.672 CHRONIC OSTEOMYELITIS OF HINDFOOT, LEFT: ICD-10-CM

## 2021-12-15 LAB — POCT GLUCOSE: 230 MG/DL (ref 70–110)

## 2021-12-15 PROCEDURE — G0277 HBOT, FULL BODY CHAMBER, 30M: HCPCS | Mod: CCAT

## 2021-12-16 ENCOUNTER — HOSPITAL ENCOUNTER (OUTPATIENT)
Dept: WOUND CARE | Facility: HOSPITAL | Age: 71
Discharge: HOME | End: 2021-12-16
Attending: SURGERY
Payer: MEDICARE

## 2021-12-16 DIAGNOSIS — M86.672 CHRONIC OSTEOMYELITIS OF HINDFOOT, LEFT: ICD-10-CM

## 2021-12-16 DIAGNOSIS — E11.621 DIABETIC FOOT ULCER WITH OSTEOMYELITIS: ICD-10-CM

## 2021-12-16 DIAGNOSIS — L97.522 ULCER OF LEFT FOOT, WITH FAT LAYER EXPOSED: ICD-10-CM

## 2021-12-16 DIAGNOSIS — M86.9 DIABETIC FOOT ULCER WITH OSTEOMYELITIS: ICD-10-CM

## 2021-12-16 DIAGNOSIS — E11.69 DIABETIC FOOT ULCER WITH OSTEOMYELITIS: ICD-10-CM

## 2021-12-16 DIAGNOSIS — L97.509 DIABETIC FOOT ULCER WITH OSTEOMYELITIS: ICD-10-CM

## 2021-12-16 PROCEDURE — G0277 HBOT, FULL BODY CHAMBER, 30M: HCPCS | Mod: CCAT

## 2021-12-17 ENCOUNTER — HOSPITAL ENCOUNTER (OUTPATIENT)
Dept: WOUND CARE | Facility: HOSPITAL | Age: 71
Discharge: HOME | End: 2021-12-17
Attending: PREVENTIVE MEDICINE
Payer: MEDICARE

## 2021-12-17 DIAGNOSIS — E11.621 DIABETIC FOOT ULCER WITH OSTEOMYELITIS: ICD-10-CM

## 2021-12-17 DIAGNOSIS — E11.69 DIABETIC FOOT ULCER WITH OSTEOMYELITIS: ICD-10-CM

## 2021-12-17 DIAGNOSIS — L97.509 DIABETIC FOOT ULCER WITH OSTEOMYELITIS: ICD-10-CM

## 2021-12-17 DIAGNOSIS — M86.672 CHRONIC OSTEOMYELITIS OF HINDFOOT, LEFT: ICD-10-CM

## 2021-12-17 DIAGNOSIS — M86.9 DIABETIC FOOT ULCER WITH OSTEOMYELITIS: ICD-10-CM

## 2021-12-17 DIAGNOSIS — L97.522 ULCER OF LEFT FOOT, WITH FAT LAYER EXPOSED: ICD-10-CM

## 2021-12-17 PROCEDURE — G0277 HBOT, FULL BODY CHAMBER, 30M: HCPCS | Mod: CCAT

## 2021-12-18 ENCOUNTER — HOSPITAL ENCOUNTER (EMERGENCY)
Facility: HOSPITAL | Age: 71
Discharge: HOME | End: 2021-12-18
Attending: EMERGENCY MEDICINE
Payer: MEDICARE

## 2021-12-18 VITALS
WEIGHT: 293 LBS | HEIGHT: 65 IN | TEMPERATURE: 98 F | RESPIRATION RATE: 18 BRPM | OXYGEN SATURATION: 99 % | BODY MASS INDEX: 48.82 KG/M2 | SYSTOLIC BLOOD PRESSURE: 161 MMHG | DIASTOLIC BLOOD PRESSURE: 64 MMHG | HEART RATE: 65 BPM

## 2021-12-18 DIAGNOSIS — Z78.9 PROBLEM WITH VASCULAR ACCESS: Primary | ICD-10-CM

## 2021-12-18 PROCEDURE — 99281 EMR DPT VST MAYX REQ PHY/QHP: CPT

## 2021-12-18 PROCEDURE — 99282 EMERGENCY DEPT VISIT SF MDM: CPT | Mod: ,,, | Performed by: EMERGENCY MEDICINE

## 2021-12-18 PROCEDURE — 99282 PR EMERGENCY DEPT VISIT,LEVEL II: ICD-10-PCS | Mod: ,,, | Performed by: EMERGENCY MEDICINE

## 2021-12-20 ENCOUNTER — HOSPITAL ENCOUNTER (OUTPATIENT)
Dept: WOUND CARE | Facility: HOSPITAL | Age: 71
Discharge: HOME | End: 2021-12-20
Attending: PREVENTIVE MEDICINE
Payer: MEDICARE

## 2021-12-20 ENCOUNTER — TELEPHONE (OUTPATIENT)
Dept: INFECTIOUS DISEASES | Facility: CLINIC | Age: 71
End: 2021-12-20
Payer: MEDICARE

## 2021-12-20 DIAGNOSIS — M86.9 DIABETIC FOOT ULCER WITH OSTEOMYELITIS: Primary | ICD-10-CM

## 2021-12-20 DIAGNOSIS — M86.10 ACUTE OSTEOMYELITIS: Primary | ICD-10-CM

## 2021-12-20 DIAGNOSIS — L97.522 ULCER OF LEFT FOOT, WITH FAT LAYER EXPOSED: ICD-10-CM

## 2021-12-20 DIAGNOSIS — L97.509 DIABETIC FOOT ULCER WITH OSTEOMYELITIS: Primary | ICD-10-CM

## 2021-12-20 DIAGNOSIS — E11.69 DIABETIC FOOT ULCER WITH OSTEOMYELITIS: ICD-10-CM

## 2021-12-20 DIAGNOSIS — M86.9 DIABETIC FOOT ULCER WITH OSTEOMYELITIS: ICD-10-CM

## 2021-12-20 DIAGNOSIS — E11.621 DIABETIC FOOT ULCER WITH OSTEOMYELITIS: ICD-10-CM

## 2021-12-20 DIAGNOSIS — L97.509 DIABETIC FOOT ULCER WITH OSTEOMYELITIS: ICD-10-CM

## 2021-12-20 DIAGNOSIS — E11.69 DIABETIC FOOT ULCER WITH OSTEOMYELITIS: Primary | ICD-10-CM

## 2021-12-20 DIAGNOSIS — E11.621 DIABETIC FOOT ULCER WITH OSTEOMYELITIS: Primary | ICD-10-CM

## 2021-12-20 DIAGNOSIS — M86.672 CHRONIC OSTEOMYELITIS OF HINDFOOT, LEFT: ICD-10-CM

## 2021-12-20 PROCEDURE — 97597 DBRDMT OPN WND 1ST 20 CM/<: CPT

## 2021-12-20 PROCEDURE — G0277 HBOT, FULL BODY CHAMBER, 30M: HCPCS | Mod: CCAT

## 2021-12-21 ENCOUNTER — LAB VISIT (OUTPATIENT)
Dept: LAB | Facility: HOSPITAL | Age: 71
End: 2021-12-21
Attending: INTERNAL MEDICINE
Payer: MEDICARE

## 2021-12-21 ENCOUNTER — TELEPHONE (OUTPATIENT)
Dept: INFECTIOUS DISEASES | Facility: CLINIC | Age: 71
End: 2021-12-21
Payer: MEDICARE

## 2021-12-21 ENCOUNTER — HOSPITAL ENCOUNTER (OUTPATIENT)
Dept: WOUND CARE | Facility: HOSPITAL | Age: 71
Discharge: HOME | End: 2021-12-21
Attending: PREVENTIVE MEDICINE
Payer: MEDICARE

## 2021-12-21 DIAGNOSIS — M86.672 CHRONIC OSTEOMYELITIS OF HINDFOOT, LEFT: ICD-10-CM

## 2021-12-21 DIAGNOSIS — E11.621 DIABETIC FOOT ULCER WITH OSTEOMYELITIS: ICD-10-CM

## 2021-12-21 DIAGNOSIS — L97.509 DIABETIC FOOT ULCER WITH OSTEOMYELITIS: ICD-10-CM

## 2021-12-21 DIAGNOSIS — L97.522 ULCER OF LEFT FOOT, WITH FAT LAYER EXPOSED: ICD-10-CM

## 2021-12-21 DIAGNOSIS — E11.69 DIABETIC FOOT ULCER WITH OSTEOMYELITIS: ICD-10-CM

## 2021-12-21 DIAGNOSIS — M86.9 DIABETIC FOOT ULCER WITH OSTEOMYELITIS: ICD-10-CM

## 2021-12-21 DIAGNOSIS — M86.10 ACUTE OSTEOMYELITIS: ICD-10-CM

## 2021-12-21 LAB
ALBUMIN SERPL BCP-MCNC: 3.4 G/DL (ref 3.5–5.2)
ALP SERPL-CCNC: 123 U/L (ref 55–135)
ALT SERPL W/O P-5'-P-CCNC: 20 U/L (ref 10–44)
ANION GAP SERPL CALC-SCNC: 5 MMOL/L (ref 8–16)
AST SERPL-CCNC: 27 U/L (ref 10–40)
BILIRUB SERPL-MCNC: 0.3 MG/DL (ref 0.1–1)
BUN SERPL-MCNC: 19 MG/DL (ref 8–23)
CALCIUM SERPL-MCNC: 9.5 MG/DL (ref 8.7–10.5)
CHLORIDE SERPL-SCNC: 105 MMOL/L (ref 95–110)
CO2 SERPL-SCNC: 29 MMOL/L (ref 23–29)
CREAT SERPL-MCNC: 1.6 MG/DL (ref 0.5–1.4)
CRP SERPL-MCNC: 13.4 MG/L (ref 0–8.2)
EST. GFR  (AFRICAN AMERICAN): 36.8 ML/MIN/1.73 M^2
EST. GFR  (NON AFRICAN AMERICAN): 32 ML/MIN/1.73 M^2
GLUCOSE SERPL-MCNC: 143 MG/DL (ref 70–110)
POTASSIUM SERPL-SCNC: 3.7 MMOL/L (ref 3.5–5.1)
PROT SERPL-MCNC: 6.4 G/DL (ref 6–8.4)
SODIUM SERPL-SCNC: 139 MMOL/L (ref 136–145)

## 2021-12-21 PROCEDURE — G0277 HBOT, FULL BODY CHAMBER, 30M: HCPCS | Mod: CCAT

## 2021-12-21 PROCEDURE — 85025 COMPLETE CBC W/AUTO DIFF WBC: CPT | Performed by: INTERNAL MEDICINE

## 2021-12-21 PROCEDURE — 86140 C-REACTIVE PROTEIN: CPT | Performed by: INTERNAL MEDICINE

## 2021-12-21 PROCEDURE — 80053 COMPREHEN METABOLIC PANEL: CPT | Performed by: INTERNAL MEDICINE

## 2021-12-21 PROCEDURE — 36415 COLL VENOUS BLD VENIPUNCTURE: CPT | Mod: PO | Performed by: INTERNAL MEDICINE

## 2021-12-22 ENCOUNTER — OFFICE VISIT (OUTPATIENT)
Dept: PODIATRY | Facility: CLINIC | Age: 71
End: 2021-12-22
Payer: MEDICARE

## 2021-12-22 ENCOUNTER — OFFICE VISIT (OUTPATIENT)
Dept: HEMATOLOGY/ONCOLOGY | Facility: CLINIC | Age: 71
End: 2021-12-22
Payer: MEDICARE

## 2021-12-22 ENCOUNTER — LAB VISIT (OUTPATIENT)
Dept: LAB | Facility: HOSPITAL | Age: 71
End: 2021-12-22
Payer: MEDICARE

## 2021-12-22 VITALS
BODY MASS INDEX: 48.82 KG/M2 | WEIGHT: 293 LBS | RESPIRATION RATE: 16 BRPM | DIASTOLIC BLOOD PRESSURE: 63 MMHG | SYSTOLIC BLOOD PRESSURE: 139 MMHG | HEIGHT: 65 IN | TEMPERATURE: 98 F | HEART RATE: 65 BPM | OXYGEN SATURATION: 98 %

## 2021-12-22 VITALS
SYSTOLIC BLOOD PRESSURE: 132 MMHG | HEART RATE: 73 BPM | WEIGHT: 293 LBS | BODY MASS INDEX: 48.82 KG/M2 | HEIGHT: 65 IN | DIASTOLIC BLOOD PRESSURE: 88 MMHG

## 2021-12-22 DIAGNOSIS — M86.9 OSTEOMYELITIS OF TOE: Primary | ICD-10-CM

## 2021-12-22 DIAGNOSIS — L97.522 ULCER OF TOE, LEFT, WITH FAT LAYER EXPOSED: ICD-10-CM

## 2021-12-22 DIAGNOSIS — N18.9 ANEMIA OF RENAL DISEASE: ICD-10-CM

## 2021-12-22 DIAGNOSIS — D63.1 ANEMIA OF RENAL DISEASE: ICD-10-CM

## 2021-12-22 DIAGNOSIS — D64.9 ANEMIA, UNSPECIFIED TYPE: ICD-10-CM

## 2021-12-22 LAB
ALBUMIN SERPL BCP-MCNC: 3.2 G/DL (ref 3.5–5.2)
ALP SERPL-CCNC: 132 U/L (ref 55–135)
ALT SERPL W/O P-5'-P-CCNC: 19 U/L (ref 10–44)
ANION GAP SERPL CALC-SCNC: 7 MMOL/L (ref 8–16)
AST SERPL-CCNC: 27 U/L (ref 10–40)
BASOPHILS # BLD AUTO: 0.03 K/UL (ref 0–0.2)
BASOPHILS NFR BLD: 0.4 % (ref 0–1.9)
BILIRUB SERPL-MCNC: 0.4 MG/DL (ref 0.1–1)
BUN SERPL-MCNC: 20 MG/DL (ref 8–23)
CALCIUM SERPL-MCNC: 9.1 MG/DL (ref 8.7–10.5)
CHLORIDE SERPL-SCNC: 107 MMOL/L (ref 95–110)
CO2 SERPL-SCNC: 25 MMOL/L (ref 23–29)
CREAT SERPL-MCNC: 1.7 MG/DL (ref 0.5–1.4)
DIFFERENTIAL METHOD: ABNORMAL
EOSINOPHIL # BLD AUTO: 0.4 K/UL (ref 0–0.5)
EOSINOPHIL NFR BLD: 5.5 % (ref 0–8)
ERYTHROCYTE [DISTWIDTH] IN BLOOD BY AUTOMATED COUNT: 16 % (ref 11.5–14.5)
ERYTHROCYTE [DISTWIDTH] IN BLOOD BY AUTOMATED COUNT: 16 % (ref 11.5–14.5)
EST. GFR  (AFRICAN AMERICAN): 34.2 ML/MIN/1.73 M^2
EST. GFR  (NON AFRICAN AMERICAN): 29.7 ML/MIN/1.73 M^2
FERRITIN SERPL-MCNC: 686 NG/ML (ref 20–300)
GLUCOSE SERPL-MCNC: 228 MG/DL (ref 70–110)
HCT VFR BLD AUTO: 29.6 % (ref 37–48.5)
HCT VFR BLD AUTO: 30.3 % (ref 37–48.5)
HGB BLD-MCNC: 9.1 G/DL (ref 12–16)
HGB BLD-MCNC: 9.3 G/DL (ref 12–16)
IMM GRANULOCYTES # BLD AUTO: 0.02 K/UL (ref 0–0.04)
IMM GRANULOCYTES # BLD AUTO: 0.03 K/UL (ref 0–0.04)
IMM GRANULOCYTES NFR BLD AUTO: 0.3 % (ref 0–0.5)
LYMPHOCYTES # BLD AUTO: 2 K/UL (ref 1–4.8)
LYMPHOCYTES NFR BLD: 29.1 % (ref 18–48)
MCH RBC QN AUTO: 28.4 PG (ref 27–31)
MCH RBC QN AUTO: 28.9 PG (ref 27–31)
MCHC RBC AUTO-ENTMCNC: 30.7 G/DL (ref 32–36)
MCHC RBC AUTO-ENTMCNC: 30.7 G/DL (ref 32–36)
MCV RBC AUTO: 92 FL (ref 82–98)
MCV RBC AUTO: 94 FL (ref 82–98)
MONOCYTES # BLD AUTO: 0.7 K/UL (ref 0.3–1)
MONOCYTES NFR BLD: 9.6 % (ref 4–15)
NEUTROPHILS # BLD AUTO: 3.4 K/UL (ref 1.8–7.7)
NEUTROPHILS # BLD AUTO: 3.7 K/UL (ref 1.8–7.7)
NEUTROPHILS NFR BLD: 55.1 % (ref 38–73)
NRBC BLD-RTO: 0 /100 WBC
PLATELET # BLD AUTO: 236 K/UL (ref 150–450)
PLATELET # BLD AUTO: 248 K/UL (ref 150–450)
PMV BLD AUTO: 11.5 FL (ref 9.2–12.9)
PMV BLD AUTO: 11.6 FL (ref 9.2–12.9)
POTASSIUM SERPL-SCNC: 4 MMOL/L (ref 3.5–5.1)
PROT SERPL-MCNC: 6.1 G/DL (ref 6–8.4)
RBC # BLD AUTO: 3.15 M/UL (ref 4–5.4)
RBC # BLD AUTO: 3.28 M/UL (ref 4–5.4)
SODIUM SERPL-SCNC: 139 MMOL/L (ref 136–145)
WBC # BLD AUTO: 5.78 K/UL (ref 3.9–12.7)
WBC # BLD AUTO: 6.77 K/UL (ref 3.9–12.7)

## 2021-12-22 PROCEDURE — 1101F PR PT FALLS ASSESS DOC 0-1 FALLS W/OUT INJ PAST YR: ICD-10-PCS | Mod: CPTII,S$GLB,, | Performed by: INTERNAL MEDICINE

## 2021-12-22 PROCEDURE — 1101F PT FALLS ASSESS-DOCD LE1/YR: CPT | Mod: CPTII,S$GLB,, | Performed by: INTERNAL MEDICINE

## 2021-12-22 PROCEDURE — 3288F PR FALLS RISK ASSESSMENT DOCUMENTED: ICD-10-PCS | Mod: CPTII,S$GLB,, | Performed by: INTERNAL MEDICINE

## 2021-12-22 PROCEDURE — 3078F PR MOST RECENT DIASTOLIC BLOOD PRESSURE < 80 MM HG: ICD-10-PCS | Mod: CPTII,S$GLB,, | Performed by: INTERNAL MEDICINE

## 2021-12-22 PROCEDURE — 4010F ACE/ARB THERAPY RXD/TAKEN: CPT | Mod: CPTII,S$GLB,, | Performed by: INTERNAL MEDICINE

## 2021-12-22 PROCEDURE — 99213 OFFICE O/P EST LOW 20 MIN: CPT | Mod: S$GLB,,, | Performed by: INTERNAL MEDICINE

## 2021-12-22 PROCEDURE — 3075F PR MOST RECENT SYSTOLIC BLOOD PRESS GE 130-139MM HG: ICD-10-PCS | Mod: CPTII,S$GLB,, | Performed by: INTERNAL MEDICINE

## 2021-12-22 PROCEDURE — 3008F PR BODY MASS INDEX (BMI) DOCUMENTED: ICD-10-PCS | Mod: CPTII,S$GLB,, | Performed by: INTERNAL MEDICINE

## 2021-12-22 PROCEDURE — 3061F NEG MICROALBUMINURIA REV: CPT | Mod: CPTII,S$GLB,, | Performed by: INTERNAL MEDICINE

## 2021-12-22 PROCEDURE — 1159F PR MEDICATION LIST DOCUMENTED IN MEDICAL RECORD: ICD-10-PCS | Mod: CPTII,S$GLB,, | Performed by: INTERNAL MEDICINE

## 2021-12-22 PROCEDURE — 1159F MED LIST DOCD IN RCRD: CPT | Mod: CPTII,S$GLB,, | Performed by: PODIATRIST

## 2021-12-22 PROCEDURE — 1126F PR PAIN SEVERITY QUANTIFIED, NO PAIN PRESENT: ICD-10-PCS | Mod: CPTII,S$GLB,, | Performed by: PODIATRIST

## 2021-12-22 PROCEDURE — 3008F BODY MASS INDEX DOCD: CPT | Mod: CPTII,S$GLB,, | Performed by: PODIATRIST

## 2021-12-22 PROCEDURE — 3008F BODY MASS INDEX DOCD: CPT | Mod: CPTII,S$GLB,, | Performed by: INTERNAL MEDICINE

## 2021-12-22 PROCEDURE — 1126F AMNT PAIN NOTED NONE PRSNT: CPT | Mod: CPTII,S$GLB,, | Performed by: INTERNAL MEDICINE

## 2021-12-22 PROCEDURE — 3078F DIAST BP <80 MM HG: CPT | Mod: CPTII,S$GLB,, | Performed by: INTERNAL MEDICINE

## 2021-12-22 PROCEDURE — 3061F PR NEG MICROALBUMINURIA RESULT DOCUMENTED/REVIEW: ICD-10-PCS | Mod: CPTII,S$GLB,, | Performed by: INTERNAL MEDICINE

## 2021-12-22 PROCEDURE — 3075F PR MOST RECENT SYSTOLIC BLOOD PRESS GE 130-139MM HG: ICD-10-PCS | Mod: CPTII,S$GLB,, | Performed by: PODIATRIST

## 2021-12-22 PROCEDURE — 3008F PR BODY MASS INDEX (BMI) DOCUMENTED: ICD-10-PCS | Mod: CPTII,S$GLB,, | Performed by: PODIATRIST

## 2021-12-22 PROCEDURE — 3061F PR NEG MICROALBUMINURIA RESULT DOCUMENTED/REVIEW: ICD-10-PCS | Mod: CPTII,S$GLB,, | Performed by: PODIATRIST

## 2021-12-22 PROCEDURE — 3051F PR MOST RECENT HEMOGLOBIN A1C LEVEL 7.0 - < 8.0%: ICD-10-PCS | Mod: CPTII,S$GLB,, | Performed by: INTERNAL MEDICINE

## 2021-12-22 PROCEDURE — 3079F PR MOST RECENT DIASTOLIC BLOOD PRESSURE 80-89 MM HG: ICD-10-PCS | Mod: CPTII,S$GLB,, | Performed by: PODIATRIST

## 2021-12-22 PROCEDURE — 3066F PR DOCUMENTATION OF TREATMENT FOR NEPHROPATHY: ICD-10-PCS | Mod: CPTII,S$GLB,, | Performed by: PODIATRIST

## 2021-12-22 PROCEDURE — 3066F PR DOCUMENTATION OF TREATMENT FOR NEPHROPATHY: ICD-10-PCS | Mod: CPTII,S$GLB,, | Performed by: INTERNAL MEDICINE

## 2021-12-22 PROCEDURE — 1159F MED LIST DOCD IN RCRD: CPT | Mod: CPTII,S$GLB,, | Performed by: INTERNAL MEDICINE

## 2021-12-22 PROCEDURE — 3051F HG A1C>EQUAL 7.0%<8.0%: CPT | Mod: CPTII,S$GLB,, | Performed by: PODIATRIST

## 2021-12-22 PROCEDURE — 99999 PR PBB SHADOW E&M-EST. PATIENT-LVL V: ICD-10-PCS | Mod: PBBFAC,,, | Performed by: INTERNAL MEDICINE

## 2021-12-22 PROCEDURE — 99213 OFFICE O/P EST LOW 20 MIN: CPT | Mod: S$GLB,,, | Performed by: PODIATRIST

## 2021-12-22 PROCEDURE — 3075F SYST BP GE 130 - 139MM HG: CPT | Mod: CPTII,S$GLB,, | Performed by: INTERNAL MEDICINE

## 2021-12-22 PROCEDURE — 4010F PR ACE/ARB THEARPY RXD/TAKEN: ICD-10-PCS | Mod: CPTII,S$GLB,, | Performed by: INTERNAL MEDICINE

## 2021-12-22 PROCEDURE — 3288F FALL RISK ASSESSMENT DOCD: CPT | Mod: CPTII,S$GLB,, | Performed by: INTERNAL MEDICINE

## 2021-12-22 PROCEDURE — 36415 COLL VENOUS BLD VENIPUNCTURE: CPT | Performed by: INTERNAL MEDICINE

## 2021-12-22 PROCEDURE — 3051F PR MOST RECENT HEMOGLOBIN A1C LEVEL 7.0 - < 8.0%: ICD-10-PCS | Mod: CPTII,S$GLB,, | Performed by: PODIATRIST

## 2021-12-22 PROCEDURE — 3061F NEG MICROALBUMINURIA REV: CPT | Mod: CPTII,S$GLB,, | Performed by: PODIATRIST

## 2021-12-22 PROCEDURE — 99999 PR PBB SHADOW E&M-EST. PATIENT-LVL V: CPT | Mod: PBBFAC,,, | Performed by: PODIATRIST

## 2021-12-22 PROCEDURE — 1159F PR MEDICATION LIST DOCUMENTED IN MEDICAL RECORD: ICD-10-PCS | Mod: CPTII,S$GLB,, | Performed by: PODIATRIST

## 2021-12-22 PROCEDURE — 4010F ACE/ARB THERAPY RXD/TAKEN: CPT | Mod: CPTII,S$GLB,, | Performed by: PODIATRIST

## 2021-12-22 PROCEDURE — 99999 PR PBB SHADOW E&M-EST. PATIENT-LVL V: CPT | Mod: PBBFAC,,, | Performed by: INTERNAL MEDICINE

## 2021-12-22 PROCEDURE — 85027 COMPLETE CBC AUTOMATED: CPT | Performed by: INTERNAL MEDICINE

## 2021-12-22 PROCEDURE — 3066F NEPHROPATHY DOC TX: CPT | Mod: CPTII,S$GLB,, | Performed by: INTERNAL MEDICINE

## 2021-12-22 PROCEDURE — 3066F NEPHROPATHY DOC TX: CPT | Mod: CPTII,S$GLB,, | Performed by: PODIATRIST

## 2021-12-22 PROCEDURE — 99999 PR PBB SHADOW E&M-EST. PATIENT-LVL V: ICD-10-PCS | Mod: PBBFAC,,, | Performed by: PODIATRIST

## 2021-12-22 PROCEDURE — 99213 PR OFFICE/OUTPT VISIT, EST, LEVL III, 20-29 MIN: ICD-10-PCS | Mod: S$GLB,,, | Performed by: INTERNAL MEDICINE

## 2021-12-22 PROCEDURE — 3075F SYST BP GE 130 - 139MM HG: CPT | Mod: CPTII,S$GLB,, | Performed by: PODIATRIST

## 2021-12-22 PROCEDURE — 3079F DIAST BP 80-89 MM HG: CPT | Mod: CPTII,S$GLB,, | Performed by: PODIATRIST

## 2021-12-22 PROCEDURE — 80053 COMPREHEN METABOLIC PANEL: CPT | Performed by: INTERNAL MEDICINE

## 2021-12-22 PROCEDURE — 1126F PR PAIN SEVERITY QUANTIFIED, NO PAIN PRESENT: ICD-10-PCS | Mod: CPTII,S$GLB,, | Performed by: INTERNAL MEDICINE

## 2021-12-22 PROCEDURE — 3051F HG A1C>EQUAL 7.0%<8.0%: CPT | Mod: CPTII,S$GLB,, | Performed by: INTERNAL MEDICINE

## 2021-12-22 PROCEDURE — 4010F PR ACE/ARB THEARPY RXD/TAKEN: ICD-10-PCS | Mod: CPTII,S$GLB,, | Performed by: PODIATRIST

## 2021-12-22 PROCEDURE — 82728 ASSAY OF FERRITIN: CPT | Performed by: INTERNAL MEDICINE

## 2021-12-22 PROCEDURE — 99213 PR OFFICE/OUTPT VISIT, EST, LEVL III, 20-29 MIN: ICD-10-PCS | Mod: S$GLB,,, | Performed by: PODIATRIST

## 2021-12-22 PROCEDURE — 1126F AMNT PAIN NOTED NONE PRSNT: CPT | Mod: CPTII,S$GLB,, | Performed by: PODIATRIST

## 2021-12-23 ENCOUNTER — TELEPHONE (OUTPATIENT)
Dept: PODIATRY | Facility: CLINIC | Age: 71
End: 2021-12-23
Payer: MEDICARE

## 2021-12-23 ENCOUNTER — HOSPITAL ENCOUNTER (OUTPATIENT)
Dept: WOUND CARE | Facility: HOSPITAL | Age: 71
Discharge: HOME | End: 2021-12-23
Attending: PREVENTIVE MEDICINE
Payer: MEDICARE

## 2021-12-23 DIAGNOSIS — M86.9 DIABETIC FOOT ULCER WITH OSTEOMYELITIS: ICD-10-CM

## 2021-12-23 DIAGNOSIS — E11.621 DIABETIC FOOT ULCER WITH OSTEOMYELITIS: ICD-10-CM

## 2021-12-23 DIAGNOSIS — M86.672 CHRONIC OSTEOMYELITIS OF HINDFOOT, LEFT: ICD-10-CM

## 2021-12-23 DIAGNOSIS — L97.522 ULCER OF LEFT FOOT, WITH FAT LAYER EXPOSED: ICD-10-CM

## 2021-12-23 DIAGNOSIS — L97.509 DIABETIC FOOT ULCER WITH OSTEOMYELITIS: ICD-10-CM

## 2021-12-23 DIAGNOSIS — E11.69 DIABETIC FOOT ULCER WITH OSTEOMYELITIS: ICD-10-CM

## 2021-12-23 PROCEDURE — G0277 HBOT, FULL BODY CHAMBER, 30M: HCPCS | Mod: CCAT

## 2021-12-27 ENCOUNTER — HOSPITAL ENCOUNTER (OUTPATIENT)
Dept: WOUND CARE | Facility: HOSPITAL | Age: 71
Discharge: HOME | End: 2021-12-27
Attending: PREVENTIVE MEDICINE
Payer: MEDICARE

## 2021-12-27 DIAGNOSIS — M86.9 DIABETIC FOOT ULCER WITH OSTEOMYELITIS: ICD-10-CM

## 2021-12-27 DIAGNOSIS — L97.509 DIABETIC FOOT ULCER WITH OSTEOMYELITIS: ICD-10-CM

## 2021-12-27 DIAGNOSIS — E11.69 DIABETIC FOOT ULCER WITH OSTEOMYELITIS: ICD-10-CM

## 2021-12-27 DIAGNOSIS — L97.522 ULCER OF LEFT FOOT, WITH FAT LAYER EXPOSED: ICD-10-CM

## 2021-12-27 DIAGNOSIS — E11.621 DIABETIC FOOT ULCER WITH OSTEOMYELITIS: ICD-10-CM

## 2021-12-27 PROCEDURE — G0277 HBOT, FULL BODY CHAMBER, 30M: HCPCS | Mod: CCAT

## 2021-12-28 ENCOUNTER — HOSPITAL ENCOUNTER (OUTPATIENT)
Dept: WOUND CARE | Facility: HOSPITAL | Age: 71
Discharge: HOME | End: 2021-12-28
Attending: SURGERY
Payer: MEDICARE

## 2021-12-28 ENCOUNTER — LAB VISIT (OUTPATIENT)
Dept: LAB | Facility: HOSPITAL | Age: 71
End: 2021-12-28
Attending: INTERNAL MEDICINE
Payer: MEDICARE

## 2021-12-28 DIAGNOSIS — E11.69 DIABETIC FOOT ULCER WITH OSTEOMYELITIS: ICD-10-CM

## 2021-12-28 DIAGNOSIS — L97.509 DIABETIC FOOT ULCER WITH OSTEOMYELITIS: ICD-10-CM

## 2021-12-28 DIAGNOSIS — M86.9 DIABETIC FOOT ULCER WITH OSTEOMYELITIS: ICD-10-CM

## 2021-12-28 DIAGNOSIS — L97.522 ULCER OF LEFT FOOT, WITH FAT LAYER EXPOSED: ICD-10-CM

## 2021-12-28 DIAGNOSIS — E11.621 DIABETIC FOOT ULCER WITH OSTEOMYELITIS: ICD-10-CM

## 2021-12-28 DIAGNOSIS — M86.672 CHRONIC OSTEOMYELITIS OF HINDFOOT, LEFT: ICD-10-CM

## 2021-12-28 DIAGNOSIS — M86.10 ACUTE OSTEOMYELITIS: ICD-10-CM

## 2021-12-28 LAB
ALBUMIN SERPL BCP-MCNC: 3.3 G/DL (ref 3.5–5.2)
ALP SERPL-CCNC: 132 U/L (ref 55–135)
ALT SERPL W/O P-5'-P-CCNC: 21 U/L (ref 10–44)
ANION GAP SERPL CALC-SCNC: 9 MMOL/L (ref 8–16)
AST SERPL-CCNC: 23 U/L (ref 10–40)
BASOPHILS # BLD AUTO: 0.05 K/UL (ref 0–0.2)
BASOPHILS NFR BLD: 0.7 % (ref 0–1.9)
BILIRUB SERPL-MCNC: 0.3 MG/DL (ref 0.1–1)
BUN SERPL-MCNC: 27 MG/DL (ref 8–23)
CALCIUM SERPL-MCNC: 9.7 MG/DL (ref 8.7–10.5)
CHLORIDE SERPL-SCNC: 109 MMOL/L (ref 95–110)
CO2 SERPL-SCNC: 27 MMOL/L (ref 23–29)
CREAT SERPL-MCNC: 1.9 MG/DL (ref 0.5–1.4)
CRP SERPL-MCNC: 12.7 MG/L (ref 0–8.2)
DIFFERENTIAL METHOD: ABNORMAL
EOSINOPHIL # BLD AUTO: 0.3 K/UL (ref 0–0.5)
EOSINOPHIL NFR BLD: 4.5 % (ref 0–8)
ERYTHROCYTE [DISTWIDTH] IN BLOOD BY AUTOMATED COUNT: 16.4 % (ref 11.5–14.5)
EST. GFR  (AFRICAN AMERICAN): 29.9 ML/MIN/1.73 M^2
EST. GFR  (NON AFRICAN AMERICAN): 26 ML/MIN/1.73 M^2
FUNGUS SPEC CULT: NORMAL
GLUCOSE SERPL-MCNC: 111 MG/DL (ref 70–110)
HCT VFR BLD AUTO: 29.6 % (ref 37–48.5)
HGB BLD-MCNC: 8.9 G/DL (ref 12–16)
IMM GRANULOCYTES # BLD AUTO: 0.01 K/UL (ref 0–0.04)
IMM GRANULOCYTES NFR BLD AUTO: 0.1 % (ref 0–0.5)
LYMPHOCYTES # BLD AUTO: 2.1 K/UL (ref 1–4.8)
LYMPHOCYTES NFR BLD: 29.8 % (ref 18–48)
MCH RBC QN AUTO: 28.2 PG (ref 27–31)
MCHC RBC AUTO-ENTMCNC: 30.1 G/DL (ref 32–36)
MCV RBC AUTO: 94 FL (ref 82–98)
MONOCYTES # BLD AUTO: 0.8 K/UL (ref 0.3–1)
MONOCYTES NFR BLD: 11 % (ref 4–15)
NEUTROPHILS # BLD AUTO: 3.7 K/UL (ref 1.8–7.7)
NEUTROPHILS NFR BLD: 53.9 % (ref 38–73)
NRBC BLD-RTO: 0 /100 WBC
PLATELET # BLD AUTO: 243 K/UL (ref 150–450)
PMV BLD AUTO: 11.8 FL (ref 9.2–12.9)
POTASSIUM SERPL-SCNC: 4.3 MMOL/L (ref 3.5–5.1)
PROT SERPL-MCNC: 6.5 G/DL (ref 6–8.4)
RBC # BLD AUTO: 3.16 M/UL (ref 4–5.4)
SODIUM SERPL-SCNC: 145 MMOL/L (ref 136–145)
WBC # BLD AUTO: 6.91 K/UL (ref 3.9–12.7)

## 2021-12-28 PROCEDURE — 36415 COLL VENOUS BLD VENIPUNCTURE: CPT | Mod: PO | Performed by: INTERNAL MEDICINE

## 2021-12-28 PROCEDURE — G0277 HBOT, FULL BODY CHAMBER, 30M: HCPCS | Mod: CCAT

## 2021-12-28 PROCEDURE — 85025 COMPLETE CBC W/AUTO DIFF WBC: CPT | Performed by: INTERNAL MEDICINE

## 2021-12-28 PROCEDURE — 86140 C-REACTIVE PROTEIN: CPT | Performed by: INTERNAL MEDICINE

## 2021-12-28 PROCEDURE — 80053 COMPREHEN METABOLIC PANEL: CPT | Performed by: INTERNAL MEDICINE

## 2021-12-29 ENCOUNTER — IMMUNIZATION (OUTPATIENT)
Dept: INTERNAL MEDICINE | Facility: CLINIC | Age: 71
End: 2021-12-29
Payer: MEDICARE

## 2021-12-29 ENCOUNTER — HOSPITAL ENCOUNTER (OUTPATIENT)
Dept: WOUND CARE | Facility: HOSPITAL | Age: 71
Discharge: HOME | End: 2021-12-29
Attending: PREVENTIVE MEDICINE
Payer: MEDICARE

## 2021-12-29 DIAGNOSIS — E11.69 DIABETIC FOOT ULCER WITH OSTEOMYELITIS: ICD-10-CM

## 2021-12-29 DIAGNOSIS — Z23 NEED FOR VACCINATION: Primary | ICD-10-CM

## 2021-12-29 DIAGNOSIS — E11.621 DIABETIC FOOT ULCER WITH OSTEOMYELITIS: ICD-10-CM

## 2021-12-29 DIAGNOSIS — M86.9 DIABETIC FOOT ULCER WITH OSTEOMYELITIS: ICD-10-CM

## 2021-12-29 DIAGNOSIS — L97.522 ULCER OF LEFT FOOT, WITH FAT LAYER EXPOSED: ICD-10-CM

## 2021-12-29 DIAGNOSIS — M86.672 CHRONIC OSTEOMYELITIS OF HINDFOOT, LEFT: ICD-10-CM

## 2021-12-29 DIAGNOSIS — L97.509 DIABETIC FOOT ULCER WITH OSTEOMYELITIS: ICD-10-CM

## 2021-12-29 PROCEDURE — 0064A COVID-19, MRNA, LNP-S, PF, 100 MCG/0.25 ML DOSE VACCINE (MODERNA BOOSTER): CPT | Mod: CV19,PBBFAC | Performed by: INTERNAL MEDICINE

## 2021-12-29 PROCEDURE — G0277 HBOT, FULL BODY CHAMBER, 30M: HCPCS | Mod: CCAT

## 2021-12-30 ENCOUNTER — HOSPITAL ENCOUNTER (OUTPATIENT)
Dept: WOUND CARE | Facility: HOSPITAL | Age: 71
Discharge: HOME | End: 2021-12-30
Attending: PREVENTIVE MEDICINE
Payer: MEDICARE

## 2021-12-30 DIAGNOSIS — L97.522 ULCER OF LEFT FOOT, WITH FAT LAYER EXPOSED: ICD-10-CM

## 2021-12-30 DIAGNOSIS — L97.509 DIABETIC FOOT ULCER WITH OSTEOMYELITIS: ICD-10-CM

## 2021-12-30 DIAGNOSIS — E11.621 DIABETIC FOOT ULCER WITH OSTEOMYELITIS: ICD-10-CM

## 2021-12-30 DIAGNOSIS — M86.672 CHRONIC OSTEOMYELITIS OF HINDFOOT, LEFT: ICD-10-CM

## 2021-12-30 DIAGNOSIS — E11.69 DIABETIC FOOT ULCER WITH OSTEOMYELITIS: ICD-10-CM

## 2021-12-30 DIAGNOSIS — M86.9 DIABETIC FOOT ULCER WITH OSTEOMYELITIS: ICD-10-CM

## 2021-12-30 PROCEDURE — G0277 HBOT, FULL BODY CHAMBER, 30M: HCPCS | Mod: CCAT

## 2022-01-03 ENCOUNTER — HOSPITAL ENCOUNTER (OUTPATIENT)
Dept: WOUND CARE | Facility: HOSPITAL | Age: 72
Discharge: HOME | End: 2022-01-03
Attending: PREVENTIVE MEDICINE
Payer: MEDICARE

## 2022-01-03 VITALS
TEMPERATURE: 96 F | HEIGHT: 65 IN | DIASTOLIC BLOOD PRESSURE: 81 MMHG | HEART RATE: 84 BPM | WEIGHT: 293 LBS | SYSTOLIC BLOOD PRESSURE: 194 MMHG | BODY MASS INDEX: 48.82 KG/M2

## 2022-01-03 DIAGNOSIS — E08.43 DIABETIC AUTONOMIC NEUROPATHY ASSOCIATED WITH DIABETES MELLITUS DUE TO UNDERLYING CONDITION: ICD-10-CM

## 2022-01-03 DIAGNOSIS — E11.621 DIABETIC FOOT ULCER WITH OSTEOMYELITIS: Primary | ICD-10-CM

## 2022-01-03 DIAGNOSIS — M86.9 DIABETIC FOOT ULCER WITH OSTEOMYELITIS: Primary | ICD-10-CM

## 2022-01-03 DIAGNOSIS — E11.69 DIABETIC FOOT ULCER WITH OSTEOMYELITIS: Primary | ICD-10-CM

## 2022-01-03 DIAGNOSIS — L97.509 DIABETIC FOOT ULCER WITH OSTEOMYELITIS: Primary | ICD-10-CM

## 2022-01-03 PROCEDURE — 99213 OFFICE O/P EST LOW 20 MIN: CPT

## 2022-01-03 PROCEDURE — 97597 DBRDMT OPN WND 1ST 20 CM/<: CPT

## 2022-01-03 NOTE — PROGRESS NOTES
Subjective:       Patient ID: Annika Mac is a 71 y.o. female.    Chief Complaint: Diabetic Foot Ulcer    1/3/22: Follow left second toe diabetic wound. Wound progressing well. Continued with current plan of care.     Review of Systems   All other systems reviewed and are negative.        Objective:      Temp:  [96.4 °F (35.8 °C)]   Pulse:  [84]   BP: (194)/(81)   Physical Exam       Wound 10/25/21 0810 Left anterior Toe, second (Active)   10/25/21 0810    Pre-existing: Yes   Primary Wound Type:    Side: Left   Orientation: anterior   Location: Toe, second   Wound Number:    Ankle-Brachial Index:    Pulses:    Removal Indication and Assessment:    Wound Outcome:    (Retired) Wound Type:    (Retired) Wound Length (cm):    (Retired) Wound Width (cm):    (Retired) Depth (cm):    Wound Description (Comments):    Removal Indications:    Wound Image    01/03/22 0900   Dressing Appearance Intact;Dry 01/03/22 0900   Appearance Closed/resurfaced 01/03/22 0900   Tissue loss description Not applicable 01/03/22 0900   Periwound Area Dry 01/03/22 0900   Wound Edges Callused;Rolled/closed 01/03/22 0900   Wound Length (cm) 0 cm 01/03/22 0900   Wound Width (cm) 0 cm 01/03/22 0900   Wound Depth (cm) 0 cm 01/03/22 0900   Wound Volume (cm^3) 0 cm^3 01/03/22 0900   Wound Surface Area (cm^2) 0 cm^2 01/03/22 0900   Care Cleansed with:;Soap and water;Debrided 01/03/22 0900   Dressing Applied;Foam;Other (comment);Tubular bandage 01/03/22 0900   Periwound Care Dry periwound area maintained 01/03/22 0900   Compression Tubular elasticized bandage 01/03/22 0900   Off Loading Off loading shoe 01/03/22 0900   Dressing Change Due 01/05/22 01/03/22 0900         Assessment:         ICD-10-CM ICD-9-CM   1. Diabetic foot ulcer with osteomyelitis  E11.621 250.80    E11.69 707.15    L97.509 730.27    M86.9 731.8   2. Diabetic autonomic neuropathy associated with diabetes mellitus due to underlying condition  E08.43 249.60     337.1         Plan:    Tissue pathology and/or culture taken:  [] Yes [x] No   Sharp debridement performed:   [x] Yes [] No   Labs ordered this visit:   [] Yes [x] No   Imaging ordered this visit:   [] Yes [x] No           Orders Placed This Encounter   Procedures    Change dressing     Cleanse wound with:Normal Saline   Lidocaine: PRN   Silver nitrate:   Primary dressing: xeroform   Secondary dressing: Foam   Offloading:Toeball   Edema control: Tubigrip   Frequency: Mon   Follow-up: Dr. Dobson 2 weeks 1/21/22        Follow up in about 18 days (around 1/21/2022) for .

## 2022-01-03 NOTE — PROGRESS NOTES
Patient unable to undergo hyperbaric treatment  Due to elevated blood pressure.  Patient states she forgot to take her blood pressure meds.  Vital signs: 10 am: 189/81, 77, 16, 97.2  1020am:  194/81, 81, 16, 97.2  Dr. Marti notified.  Hyperbaric treatment cancelled today.  Patient instructed to   Take blood pressure medications as prescribed.    MOSES Eaton

## 2022-01-04 ENCOUNTER — HOSPITAL ENCOUNTER (OUTPATIENT)
Dept: WOUND CARE | Facility: HOSPITAL | Age: 72
Discharge: HOME | End: 2022-01-04
Attending: PREVENTIVE MEDICINE
Payer: MEDICARE

## 2022-01-04 ENCOUNTER — TELEPHONE (OUTPATIENT)
Dept: INTERNAL MEDICINE | Facility: CLINIC | Age: 72
End: 2022-01-04
Payer: MEDICARE

## 2022-01-04 ENCOUNTER — LAB VISIT (OUTPATIENT)
Dept: LAB | Facility: HOSPITAL | Age: 72
End: 2022-01-04
Attending: INTERNAL MEDICINE
Payer: MEDICARE

## 2022-01-04 DIAGNOSIS — L97.509 DIABETIC FOOT ULCER WITH OSTEOMYELITIS: ICD-10-CM

## 2022-01-04 DIAGNOSIS — M86.9 DIABETIC FOOT ULCER WITH OSTEOMYELITIS: ICD-10-CM

## 2022-01-04 DIAGNOSIS — E11.621 DIABETIC FOOT ULCER WITH OSTEOMYELITIS: ICD-10-CM

## 2022-01-04 DIAGNOSIS — E11.69 DIABETIC FOOT ULCER WITH OSTEOMYELITIS: ICD-10-CM

## 2022-01-04 DIAGNOSIS — L97.522 ULCER OF LEFT FOOT, WITH FAT LAYER EXPOSED: ICD-10-CM

## 2022-01-04 DIAGNOSIS — M86.672 CHRONIC OSTEOMYELITIS OF HINDFOOT, LEFT: ICD-10-CM

## 2022-01-04 DIAGNOSIS — M86.10 ACUTE OSTEOMYELITIS: ICD-10-CM

## 2022-01-04 PROCEDURE — 85025 COMPLETE CBC W/AUTO DIFF WBC: CPT | Performed by: INTERNAL MEDICINE

## 2022-01-04 PROCEDURE — 80053 COMPREHEN METABOLIC PANEL: CPT | Performed by: INTERNAL MEDICINE

## 2022-01-04 PROCEDURE — 36415 COLL VENOUS BLD VENIPUNCTURE: CPT | Mod: PO | Performed by: INTERNAL MEDICINE

## 2022-01-04 PROCEDURE — 86140 C-REACTIVE PROTEIN: CPT | Performed by: INTERNAL MEDICINE

## 2022-01-04 PROCEDURE — G0277 HBOT, FULL BODY CHAMBER, 30M: HCPCS | Mod: CCAT

## 2022-01-04 NOTE — TELEPHONE ENCOUNTER
----- Message from Della Newman sent at 1/4/2022  3:50 PM CST -----  Contact: Pt-- [phone]  Patient would like to get medical advice.    Symptoms (please be specific):  high blood pressure    Would you like a call back, or a response through your [facility] portal?:   call    Comments:   Pt states she is experiencing high blood pressure.

## 2022-01-04 NOTE — PROGRESS NOTES
01/04/22 1224   Hyperbaric Pre-Inspection   Mattress cleaned prior to treatment Yes   2 Patient Identifiers Verified Yes   Consent Obtained Yes   Cotton Gown Yes   Patient voided Yes   Patient Pregnant No   Glasses, Jewelry, Makeup, etc. Removed Yes   Hard contacts removed Yes   Dentures, Hearing Aid, Prosthetic Devices Removed Yes   Touch hair to check for hair spray Yes   Cold/Flu Symptoms No   Diabetic Patient Eaten Yes   Medications Given No   Fears and apprehensions verbalized Yes   Ground Wire Secured Yes   HBO Treatment Course Details   Treatment Course Number 1   Ordering Provider Olya Cruz Chronic refractory osteomyelitis;Diabetic wounds of lower extremities   HBO Treatment Start Date 12/15/21   HBO Treatment Details   Treatment Number 10   Inpatient Visit No   Total Treatment Length Calc (minutes) 107   Chamber Type Monoplace   Chamber # 10   HBO Dive Log # [id number]   Treatment Protocol   (2.0 JAYDA x  minutes w/100% oxygen and no air break)   Treatment Details   Dive Rate Down 1.5 psi/minute   Dive Rate Up 2.0 psi/minute   Compress Begins 1 JAYDA   Clock Time 1050   Tx Pressure Reached 2 JAYDA   Clock Time 1100   Decompress Begins 2 JAYDA   Clock Time 1230   Decompress Ends 1 JAYDA   Clock Time 1237   Pre HBO Vital Signs   BP (!) 154/72   Pulse 76   Resp 16   Temp 97.2 °F (36.2 °C)   Blood Glucose 133   Glucose Meter # HBO   Pain Level 0   Post HBO Vital Signs   BP (!) 180/87   Pulse 78   Resp 16   Temp 97.2 °F (36.2 °C)   Blood Glucose 85   Glucose Meter # HBO   Pain Level 0   Ear Evaluation   Left Teed Scale Pre Grade 0   Left Teed Scale Post Grade 0   Right Teed Scale Pre Grade 0   Right Teed Scale Post Grade 0   Physician Supervision  I provided direct supervision and was immediately available to furnish assistance and direction throughout the performance of the procedure.    Emergency Response Team  A trained emergency response team and emergency services were available throughout  procedure.  Patient tolerated treatment well.  Continue present treatment plan.  Patient reports no complaints tolerated treatment well.    DX:  E11.621, M86.672, L95.597

## 2022-01-05 ENCOUNTER — HOSPITAL ENCOUNTER (OUTPATIENT)
Dept: WOUND CARE | Facility: HOSPITAL | Age: 72
Discharge: HOME | End: 2022-01-05
Attending: PREVENTIVE MEDICINE
Payer: MEDICARE

## 2022-01-05 DIAGNOSIS — M86.672 CHRONIC OSTEOMYELITIS OF HINDFOOT, LEFT: ICD-10-CM

## 2022-01-05 DIAGNOSIS — L97.522 ULCER OF LEFT FOOT, WITH FAT LAYER EXPOSED: ICD-10-CM

## 2022-01-05 DIAGNOSIS — L97.509 DIABETIC FOOT ULCER WITH OSTEOMYELITIS: ICD-10-CM

## 2022-01-05 DIAGNOSIS — E11.69 DIABETIC FOOT ULCER WITH OSTEOMYELITIS: ICD-10-CM

## 2022-01-05 DIAGNOSIS — M86.9 DIABETIC FOOT ULCER WITH OSTEOMYELITIS: ICD-10-CM

## 2022-01-05 DIAGNOSIS — E11.621 DIABETIC FOOT ULCER WITH OSTEOMYELITIS: ICD-10-CM

## 2022-01-05 LAB
ALBUMIN SERPL BCP-MCNC: 3.2 G/DL (ref 3.5–5.2)
ALP SERPL-CCNC: 130 U/L (ref 55–135)
ALT SERPL W/O P-5'-P-CCNC: 19 U/L (ref 10–44)
ANION GAP SERPL CALC-SCNC: 9 MMOL/L (ref 8–16)
AST SERPL-CCNC: 26 U/L (ref 10–40)
BASOPHILS # BLD AUTO: 0.04 K/UL (ref 0–0.2)
BASOPHILS NFR BLD: 0.6 % (ref 0–1.9)
BILIRUB SERPL-MCNC: 0.3 MG/DL (ref 0.1–1)
BUN SERPL-MCNC: 19 MG/DL (ref 8–23)
CALCIUM SERPL-MCNC: 9.5 MG/DL (ref 8.7–10.5)
CHLORIDE SERPL-SCNC: 107 MMOL/L (ref 95–110)
CO2 SERPL-SCNC: 26 MMOL/L (ref 23–29)
CREAT SERPL-MCNC: 1.3 MG/DL (ref 0.5–1.4)
CRP SERPL-MCNC: 17.1 MG/L (ref 0–8.2)
DIFFERENTIAL METHOD: ABNORMAL
EOSINOPHIL # BLD AUTO: 0.5 K/UL (ref 0–0.5)
EOSINOPHIL NFR BLD: 7.5 % (ref 0–8)
ERYTHROCYTE [DISTWIDTH] IN BLOOD BY AUTOMATED COUNT: 15.9 % (ref 11.5–14.5)
EST. GFR  (AFRICAN AMERICAN): 47.4 ML/MIN/1.73 M^2
EST. GFR  (NON AFRICAN AMERICAN): 41.1 ML/MIN/1.73 M^2
GLUCOSE SERPL-MCNC: 53 MG/DL (ref 70–110)
HCT VFR BLD AUTO: 28.2 % (ref 37–48.5)
HGB BLD-MCNC: 8.8 G/DL (ref 12–16)
IMM GRANULOCYTES # BLD AUTO: 0.01 K/UL (ref 0–0.04)
IMM GRANULOCYTES NFR BLD AUTO: 0.1 % (ref 0–0.5)
LYMPHOCYTES # BLD AUTO: 1.9 K/UL (ref 1–4.8)
LYMPHOCYTES NFR BLD: 26.2 % (ref 18–48)
MCH RBC QN AUTO: 28.6 PG (ref 27–31)
MCHC RBC AUTO-ENTMCNC: 31.2 G/DL (ref 32–36)
MCV RBC AUTO: 92 FL (ref 82–98)
MONOCYTES # BLD AUTO: 0.7 K/UL (ref 0.3–1)
MONOCYTES NFR BLD: 9.3 % (ref 4–15)
NEUTROPHILS # BLD AUTO: 4.1 K/UL (ref 1.8–7.7)
NEUTROPHILS NFR BLD: 56.3 % (ref 38–73)
NRBC BLD-RTO: 0 /100 WBC
PLATELET # BLD AUTO: 220 K/UL (ref 150–450)
PMV BLD AUTO: 11.9 FL (ref 9.2–12.9)
POTASSIUM SERPL-SCNC: 3.6 MMOL/L (ref 3.5–5.1)
PROT SERPL-MCNC: 6.1 G/DL (ref 6–8.4)
RBC # BLD AUTO: 3.08 M/UL (ref 4–5.4)
SODIUM SERPL-SCNC: 142 MMOL/L (ref 136–145)
WBC # BLD AUTO: 7.22 K/UL (ref 3.9–12.7)

## 2022-01-05 PROCEDURE — G0277 HBOT, FULL BODY CHAMBER, 30M: HCPCS | Mod: CCAT

## 2022-01-05 NOTE — PROGRESS NOTES
01/05/22 0929   Hyperbaric Pre-Inspection   Mattress cleaned prior to treatment Yes   2 Patient Identifiers Verified Yes   Consent Obtained Yes   Cotton Gown Yes   Patient voided Yes   Cuff deflated of air and inflated with saline Yes   Patient Pregnant No   Glasses, Jewelry, Makeup, etc. Removed Yes   Hard contacts removed Yes   Dentures, Hearing Aid, Prosthetic Devices Removed Yes   Touch hair to check for hair spray Yes   Cold/Flu Symptoms No   Diabetic Patient Eaten Yes   Medications Given No   Fears and apprehensions verbalized Yes   Ground Wire Secured Yes   HBO Treatment Course Details   Treatment Course Number 1   Total Treatments Ordered 20   Ordering Provider Dobson   Indications Chronic refractory osteomyelitis   HBO Treatment Start Date 12/15/21   HBO Treatment Details   Treatment Number 11   Total Treatment Length Calc (minutes) 107   Chamber Type Monoplace   Chamber # 4   HBO Dive Log # 6223   Treatment Protocol   (2.0 JAYDA x  minutes w/100% oxygen and no air break)   Treatment Details   Dive Rate Down 1.5 psi/minute   Dive Rate Up 2.0 psi/minute   Compress Begins 1 JAYDA   Clock Time 0905   Tx Pressure Reached 2 JAYDA   Clock Time 0915   Decompress Begins 2 JAYDA   Clock Time 1045   Decompress Ends 1 JAYDA   Clock Time 1052   Pre HBO Vital Signs   BP (!) 147/67   Pulse 79   Resp 16   Temp 98 °F (36.7 °C)   Blood Glucose 133   Glucose Meter # HBO   Pain Level 0   Post HBO Vital Signs   BP (!) 147/63   Pulse 80   Resp 16   Temp 98 °F (36.7 °C)   Blood Glucose 191   Glucose Meter # HBO   Pain Level 0   Ear Evaluation   Left Teed Scale Pre Grade 0   Left Teed Scale Post Grade 0   Right Teed Scale Pre Grade 0   Right Teed Scale Post Grade 0   Physician Supervision  I provided direct supervision and was immediately available to furnish assistance and direction throughout the performance of the procedure.    Emergency Response Team  A trained emergency response team and emergency services were available  throughout procedure.  Patient tolerated treatment well.  Continue present treatment plan.  Patient reports no complaints tolerated well.  DX:  E11.621, M86.672, L97.887

## 2022-01-06 ENCOUNTER — TELEPHONE (OUTPATIENT)
Dept: INTERNAL MEDICINE | Facility: CLINIC | Age: 72
End: 2022-01-06
Payer: MEDICARE

## 2022-01-06 ENCOUNTER — HOSPITAL ENCOUNTER (OUTPATIENT)
Dept: WOUND CARE | Facility: HOSPITAL | Age: 72
Discharge: HOME | End: 2022-01-06
Attending: PREVENTIVE MEDICINE
Payer: MEDICARE

## 2022-01-06 DIAGNOSIS — L97.509 DIABETIC FOOT ULCER WITH OSTEOMYELITIS: ICD-10-CM

## 2022-01-06 DIAGNOSIS — E11.621 DIABETIC FOOT ULCER WITH OSTEOMYELITIS: ICD-10-CM

## 2022-01-06 DIAGNOSIS — M86.9 DIABETIC FOOT ULCER WITH OSTEOMYELITIS: ICD-10-CM

## 2022-01-06 DIAGNOSIS — M86.672 OSTEOMYELITIS, CHRONIC, ANKLE OR FOOT, LEFT: ICD-10-CM

## 2022-01-06 DIAGNOSIS — L97.522 ULCER OF LEFT FOOT, WITH FAT LAYER EXPOSED: ICD-10-CM

## 2022-01-06 DIAGNOSIS — E11.69 DIABETIC FOOT ULCER WITH OSTEOMYELITIS: ICD-10-CM

## 2022-01-06 PROCEDURE — G0277 HBOT, FULL BODY CHAMBER, 30M: HCPCS | Mod: CCAT

## 2022-01-06 NOTE — PROGRESS NOTES
01/06/22 1105   Hyperbaric Pre-Inspection   Mattress cleaned prior to treatment Yes   2 Patient Identifiers Verified Yes   For Inpatients: Verify correct ID band/correct chart Yes   Consent Obtained Yes   Cotton Gown Yes   Patient voided Yes   Drainage Bags Emptied Not applicable   Patient Pregnant Not applicable   Glasses, Jewelry, Makeup, etc. Removed Yes   Hard contacts removed Yes   Dentures, Hearing Aid, Prosthetic Devices Removed Yes   Touch hair to check for hair spray Yes   Diabetic Patient Eaten Yes   Medications Given No   Fears and apprehensions verbalized No   Ground Wire Secured Yes   HBO Treatment Course Details   Treatment Course Number 1   Total Treatments Ordered 20   Ordering Provider Dr. Dobson   Indications Diabetic wounds of lower extremities;Chronic refractory osteomyelitis   HBO Treatment Start Date 12/15/22   HBO Treatment Details   Treatment Number 12   Inpatient Visit No   Total Treatment Length Calc (minutes) 107   Chamber Type Monoplace   Chamber # 4   Treatment Protocol Other Treatment Protocol (enter in treatment notes)  (2.0 JAYDA x90-120 mins w/ 100% oxygwn and no air breaks)   Treatment Details   Dive Rate Down 1.5 psi/minute   Dive Rate Up 2.0 psi/minute   Compress Begins 1.0 JAYDA   Clock Time 0955   Tx Pressure Reached 2.0 JAYDA   Clock Time 1005   Decompress Begins 2.0 JAYDA   Clock Time 1135   Decompress Ends 1.0 JAYDA   Clock Time 1142   Pre HBO Vital Signs   BP (!) 148/66   Pulse 77   Resp 16   Temp 97.4 °F (36.3 °C)   Blood Glucose 205   Glucose Meter # HBO   Pain Level 0   Post HBO Vital Signs   BP (!) 149/66   Pulse 74   Resp 16   Temp 97.4 °F (36.3 °C)   Blood Glucose 179   Glucose Meter # HBO   Pain Level 0   Ear Evaluation   Left Teed Scale Pre Grade 0   Left Teed Scale Post Grade 0   Right Teed Scale Pre Grade 0   Right Teed Scale Post Grade 0   Physician Supervision  I provided direct supervision and was immediately available to furnish assistance and direction throughout the  performance of the procedure.    Emergency Response Team  A trained emergency response team and emergency services were available throughout procedure.        ICD-10-CM ICD-9-CM   1. Diabetic foot ulcer with osteomyelitis  E11.621 250.80    E11.69 707.15    L97.509 730.27    M86.9 731.8   2. Osteomyelitis, chronic, ankle or foot, left  M86.672 730.17   3. Ulcer of left foot, with fat layer exposed  L97.522 707.15    Patient tolerated treatment well.  Continue present treatment plan.  Patient reports no complaints , tolerated well.

## 2022-01-06 NOTE — TELEPHONE ENCOUNTER
"Pt called in to the office. Goes to the hyperbaric for a foot ulcer. Pt has been running high blood pressures. Today it was 149/67. Blood pressure was 184/81 on 1/3.Pt went to take a manual BP on her machine while we are talking. At this time it was 132/57-P81.Takes meds before she goes to her appts.Pt denies dizziness, or lightheadedness.Pt complaining of headaches and has been taking tylenol for relief. Pt states it helps "a teenie bit". Pt has an appt for Friday. Went over pts medication with her with a verbal read back of understanding. Suggested pt continue to take a blood pressure log before going to her appts and after, continue to take her meds daily, and if the headaches continue or you become dizzy or light headed go to the nearest   or ER for evaluation. Put pt on the wait list for appts. Due not able to find any available appts.  "

## 2022-01-07 ENCOUNTER — LAB VISIT (OUTPATIENT)
Dept: LAB | Facility: HOSPITAL | Age: 72
End: 2022-01-07
Attending: INTERNAL MEDICINE
Payer: MEDICARE

## 2022-01-07 ENCOUNTER — HOSPITAL ENCOUNTER (OUTPATIENT)
Dept: WOUND CARE | Facility: HOSPITAL | Age: 72
Discharge: HOME | End: 2022-01-07
Attending: PREVENTIVE MEDICINE
Payer: MEDICARE

## 2022-01-07 DIAGNOSIS — M86.9 DIABETIC FOOT ULCER WITH OSTEOMYELITIS: ICD-10-CM

## 2022-01-07 DIAGNOSIS — M86.672 CHRONIC OSTEOMYELITIS OF HINDFOOT, LEFT: ICD-10-CM

## 2022-01-07 DIAGNOSIS — E11.69 DIABETIC FOOT ULCER WITH OSTEOMYELITIS: ICD-10-CM

## 2022-01-07 DIAGNOSIS — L97.522 ULCER OF LEFT FOOT, WITH FAT LAYER EXPOSED: ICD-10-CM

## 2022-01-07 DIAGNOSIS — E11.621 DIABETIC FOOT ULCER WITH OSTEOMYELITIS: ICD-10-CM

## 2022-01-07 DIAGNOSIS — L97.509 DIABETIC FOOT ULCER WITH OSTEOMYELITIS: ICD-10-CM

## 2022-01-07 LAB
ALBUMIN SERPL BCP-MCNC: 3.1 G/DL (ref 3.5–5.2)
ALP SERPL-CCNC: 123 U/L (ref 55–135)
ALT SERPL W/O P-5'-P-CCNC: 17 U/L (ref 10–44)
ANION GAP SERPL CALC-SCNC: 6 MMOL/L (ref 8–16)
AST SERPL-CCNC: 22 U/L (ref 10–40)
BILIRUB SERPL-MCNC: 0.4 MG/DL (ref 0.1–1)
BUN SERPL-MCNC: 19 MG/DL (ref 8–23)
CALCIUM SERPL-MCNC: 9.6 MG/DL (ref 8.7–10.5)
CHLORIDE SERPL-SCNC: 109 MMOL/L (ref 95–110)
CHOLEST SERPL-MCNC: 138 MG/DL (ref 120–199)
CHOLEST/HDLC SERPL: 3.6 {RATIO} (ref 2–5)
CO2 SERPL-SCNC: 27 MMOL/L (ref 23–29)
CREAT SERPL-MCNC: 1.2 MG/DL (ref 0.5–1.4)
EST. GFR  (AFRICAN AMERICAN): 52.2 ML/MIN/1.73 M^2
EST. GFR  (NON AFRICAN AMERICAN): 45.3 ML/MIN/1.73 M^2
ESTIMATED AVG GLUCOSE: 143 MG/DL (ref 68–131)
GLUCOSE SERPL-MCNC: 91 MG/DL (ref 70–110)
HBA1C MFR BLD: 6.6 % (ref 4–5.6)
HDLC SERPL-MCNC: 38 MG/DL (ref 40–75)
HDLC SERPL: 27.5 % (ref 20–50)
LDLC SERPL CALC-MCNC: 80 MG/DL (ref 63–159)
NONHDLC SERPL-MCNC: 100 MG/DL
POTASSIUM SERPL-SCNC: 4 MMOL/L (ref 3.5–5.1)
PROT SERPL-MCNC: 6 G/DL (ref 6–8.4)
SODIUM SERPL-SCNC: 142 MMOL/L (ref 136–145)
TRIGL SERPL-MCNC: 100 MG/DL (ref 30–150)

## 2022-01-07 PROCEDURE — G0277 HBOT, FULL BODY CHAMBER, 30M: HCPCS | Mod: CCAT

## 2022-01-07 PROCEDURE — 83036 HEMOGLOBIN GLYCOSYLATED A1C: CPT | Performed by: INTERNAL MEDICINE

## 2022-01-07 PROCEDURE — G0179 MD RECERTIFICATION HHA PT: HCPCS | Mod: ,,, | Performed by: INTERNAL MEDICINE

## 2022-01-07 PROCEDURE — 80053 COMPREHEN METABOLIC PANEL: CPT | Performed by: INTERNAL MEDICINE

## 2022-01-07 PROCEDURE — G0179 PR HOME HEALTH MD RECERTIFICATION: ICD-10-PCS | Mod: ,,, | Performed by: INTERNAL MEDICINE

## 2022-01-07 PROCEDURE — 36415 COLL VENOUS BLD VENIPUNCTURE: CPT | Mod: PO | Performed by: INTERNAL MEDICINE

## 2022-01-07 PROCEDURE — 80061 LIPID PANEL: CPT | Performed by: INTERNAL MEDICINE

## 2022-01-07 NOTE — TELEPHONE ENCOUNTER
See if she can come see me on Friday 1/14  When they open my schedule for follow up   Have her bring her home readings and medications

## 2022-01-07 NOTE — PROCEDURES
"Debridement    Date/Time: 1/3/2022 8:53 AM  Performed by: Jesús Dobson MD  Authorized by: Jesús Dobson MD     Time out: Immediately prior to procedure a "time out" was called to verify the correct patient, procedure, equipment, support staff and site/side marked as required.    Consent Done?:  Yes (Written)  Local anesthesia used?: Yes    Local anesthetic:  Topical anesthetic    Wound Details:    Location:  Left foot    Location:  Left 2nd Toe    Type of Debridement:  Excisional       Length (cm):  0       Area (sq cm):  0       Width (cm):  0       Percent Debrided (%):  100       Depth (cm):  0       Total Area Debrided (sq cm):  0    Depth of debridement:  Epidermis/Dermis    Tissue debrided:  Epidermis and Dermis    Devitalized tissue debrided:  Callus    Instruments:  Blade    Bleeding:  None  Patient tolerance:  Patient tolerated the procedure well with no immediate complications      "

## 2022-01-07 NOTE — PROGRESS NOTES
01/07/22 1405   Hyperbaric Pre-Inspection   Mattress cleaned prior to treatment Yes   2 Patient Identifiers Verified Yes   Consent Obtained Yes   Cotton Gown Yes   Patient voided Yes   Drainage Bags Emptied Not applicable   Patient Pregnant No   Glasses, Jewelry, Makeup, etc. Removed Yes   Hard contacts removed Yes   Dentures, Hearing Aid, Prosthetic Devices Removed Yes   Touch hair to check for hair spray Yes   Cold/Flu Symptoms No   Diabetic Patient Eaten Yes   Medications Given No   Fears and apprehensions verbalized Yes   Ground Wire Secured Yes   HBO Treatment Course Details   Treatment Course Number 1   Total Treatments Ordered 20   Ordering Provider Olya   Indications   (2.0 JAYDA x  minutes w/100% oxygen and no air break)   HBO Treatment Start Date 12/15/21   HBO Treatment Details   Treatment Number 13   Total Treatment Length Calc (minutes) 107   Chamber Type Monoplace   Chamber # 2   HBO Dive Log # [id number]   Treatment Protocol   (2.0 JAYDA x  minutes w/100% oxygen and no air break)   Treatment Details   Dive Rate Down 1.5 psi/minute   Dive Rate Up 2.0 psi/minute   Compress Begins 1 JAYDA   Clock Time 1005   Tx Pressure Reached 2 JAYDA   Clock Time 1015   Decompress Begins 2 JAYDA   Clock Time 1145   Decompress Ends 1 JAYDA   Clock Time 1152   Pre HBO Vital Signs   BP (!) 144/67   Pulse 74   Resp 16   Temp 98.4 °F (36.9 °C)   Blood Glucose 127   Glucose Meter # HBO   Pain Level 0   Post HBO Vital Signs   BP (!) 155/70   Pulse 75   Resp 16   Temp 98.4 °F (36.9 °C)   Blood Glucose 144   Glucose Meter # HBO   Pain Level 0   Ear Evaluation   Left Teed Scale Pre Grade 0   Left Teed Scale Post Grade 0   Right Teed Scale Pre Grade 0   Right Teed Scale Post Grade 0   Physician Supervision  I provided direct supervision and was immediately available to furnish assistance and direction throughout the performance of the procedure.    Patient tolerated treatment well.  Continue present treatment  plan.        Emergency Response Team  A trained emergency response team and emergency services were available throughout procedure.      E11.621, M86.672, L97.523

## 2022-01-10 ENCOUNTER — HOSPITAL ENCOUNTER (OUTPATIENT)
Dept: WOUND CARE | Facility: HOSPITAL | Age: 72
Discharge: HOME | End: 2022-01-10
Attending: PREVENTIVE MEDICINE
Payer: MEDICARE

## 2022-01-10 ENCOUNTER — PATIENT MESSAGE (OUTPATIENT)
Dept: ENDOCRINOLOGY | Facility: HOSPITAL | Age: 72
End: 2022-01-10
Payer: MEDICARE

## 2022-01-10 DIAGNOSIS — M86.9 DIABETIC FOOT ULCER WITH OSTEOMYELITIS: ICD-10-CM

## 2022-01-10 DIAGNOSIS — M86.672 OSTEOMYELITIS, CHRONIC, ANKLE OR FOOT, LEFT: ICD-10-CM

## 2022-01-10 DIAGNOSIS — L97.509 DIABETIC FOOT ULCER WITH OSTEOMYELITIS: ICD-10-CM

## 2022-01-10 DIAGNOSIS — L97.522 ULCER OF LEFT FOOT, WITH FAT LAYER EXPOSED: ICD-10-CM

## 2022-01-10 DIAGNOSIS — E11.69 DIABETIC FOOT ULCER WITH OSTEOMYELITIS: ICD-10-CM

## 2022-01-10 DIAGNOSIS — E11.621 DIABETIC FOOT ULCER WITH OSTEOMYELITIS: ICD-10-CM

## 2022-01-10 PROCEDURE — G0277 HBOT, FULL BODY CHAMBER, 30M: HCPCS | Mod: CCAT

## 2022-01-11 ENCOUNTER — TELEPHONE (OUTPATIENT)
Dept: INFECTIOUS DISEASES | Facility: CLINIC | Age: 72
End: 2022-01-11
Payer: MEDICARE

## 2022-01-11 ENCOUNTER — LAB VISIT (OUTPATIENT)
Dept: LAB | Facility: HOSPITAL | Age: 72
End: 2022-01-11
Attending: INTERNAL MEDICINE
Payer: MEDICARE

## 2022-01-11 ENCOUNTER — TELEPHONE (OUTPATIENT)
Dept: PODIATRY | Facility: CLINIC | Age: 72
End: 2022-01-11
Payer: MEDICARE

## 2022-01-11 DIAGNOSIS — M86.10 ACUTE OSTEOMYELITIS: ICD-10-CM

## 2022-01-11 LAB
ALBUMIN SERPL BCP-MCNC: 3.2 G/DL (ref 3.5–5.2)
ALP SERPL-CCNC: 118 U/L (ref 55–135)
ALT SERPL W/O P-5'-P-CCNC: 14 U/L (ref 10–44)
ANION GAP SERPL CALC-SCNC: 7 MMOL/L (ref 8–16)
AST SERPL-CCNC: 19 U/L (ref 10–40)
BASOPHILS # BLD AUTO: 0.05 K/UL (ref 0–0.2)
BASOPHILS NFR BLD: 0.8 % (ref 0–1.9)
BILIRUB SERPL-MCNC: 0.3 MG/DL (ref 0.1–1)
BUN SERPL-MCNC: 23 MG/DL (ref 8–23)
CALCIUM SERPL-MCNC: 9.5 MG/DL (ref 8.7–10.5)
CHLORIDE SERPL-SCNC: 106 MMOL/L (ref 95–110)
CO2 SERPL-SCNC: 26 MMOL/L (ref 23–29)
CREAT SERPL-MCNC: 1.6 MG/DL (ref 0.5–1.4)
CRP SERPL-MCNC: 10.7 MG/L (ref 0–8.2)
DIFFERENTIAL METHOD: ABNORMAL
EOSINOPHIL # BLD AUTO: 0.6 K/UL (ref 0–0.5)
EOSINOPHIL NFR BLD: 9.2 % (ref 0–8)
ERYTHROCYTE [DISTWIDTH] IN BLOOD BY AUTOMATED COUNT: 15.9 % (ref 11.5–14.5)
EST. GFR  (AFRICAN AMERICAN): 36.8 ML/MIN/1.73 M^2
EST. GFR  (NON AFRICAN AMERICAN): 32 ML/MIN/1.73 M^2
GLUCOSE SERPL-MCNC: 100 MG/DL (ref 70–110)
HCT VFR BLD AUTO: 27.5 % (ref 37–48.5)
HGB BLD-MCNC: 8.7 G/DL (ref 12–16)
IMM GRANULOCYTES # BLD AUTO: 0.02 K/UL (ref 0–0.04)
IMM GRANULOCYTES NFR BLD AUTO: 0.3 % (ref 0–0.5)
LYMPHOCYTES # BLD AUTO: 1.4 K/UL (ref 1–4.8)
LYMPHOCYTES NFR BLD: 22.8 % (ref 18–48)
MCH RBC QN AUTO: 29.3 PG (ref 27–31)
MCHC RBC AUTO-ENTMCNC: 31.6 G/DL (ref 32–36)
MCV RBC AUTO: 93 FL (ref 82–98)
MONOCYTES # BLD AUTO: 0.6 K/UL (ref 0.3–1)
MONOCYTES NFR BLD: 10.4 % (ref 4–15)
NEUTROPHILS # BLD AUTO: 3.5 K/UL (ref 1.8–7.7)
NEUTROPHILS NFR BLD: 56.5 % (ref 38–73)
NRBC BLD-RTO: 0 /100 WBC
PLATELET # BLD AUTO: 220 K/UL (ref 150–450)
PMV BLD AUTO: 11.5 FL (ref 9.2–12.9)
POTASSIUM SERPL-SCNC: 4.3 MMOL/L (ref 3.5–5.1)
PROT SERPL-MCNC: 5.9 G/DL (ref 6–8.4)
RBC # BLD AUTO: 2.97 M/UL (ref 4–5.4)
SODIUM SERPL-SCNC: 139 MMOL/L (ref 136–145)
WBC # BLD AUTO: 6.18 K/UL (ref 3.9–12.7)

## 2022-01-11 PROCEDURE — 36415 COLL VENOUS BLD VENIPUNCTURE: CPT | Mod: PO | Performed by: INTERNAL MEDICINE

## 2022-01-11 PROCEDURE — 80053 COMPREHEN METABOLIC PANEL: CPT | Performed by: INTERNAL MEDICINE

## 2022-01-11 PROCEDURE — 86140 C-REACTIVE PROTEIN: CPT | Performed by: INTERNAL MEDICINE

## 2022-01-11 PROCEDURE — 85025 COMPLETE CBC W/AUTO DIFF WBC: CPT | Performed by: INTERNAL MEDICINE

## 2022-01-11 NOTE — TELEPHONE ENCOUNTER
Spoke with pt and informed her that yes, she does need to have her blood work done today and as long as she's on IV abx weekly. Pt understood and stated will go to her lab appt on [facility] today.

## 2022-01-11 NOTE — TELEPHONE ENCOUNTER
----- Message from Alberta Kendrick sent at 1/11/2022  9:05 AM CST -----  Contact: [phone]  Pt would like to know if she still have to have her labs done at [facility]?  Pt is scheduled for labs today at 3:30 so pt would like a call back ASAP.    [phone]

## 2022-01-14 ENCOUNTER — OFFICE VISIT (OUTPATIENT)
Dept: INTERNAL MEDICINE | Facility: CLINIC | Age: 72
End: 2022-01-14
Payer: MEDICARE

## 2022-01-14 VITALS
SYSTOLIC BLOOD PRESSURE: 132 MMHG | OXYGEN SATURATION: 99 % | DIASTOLIC BLOOD PRESSURE: 62 MMHG | WEIGHT: 293 LBS | BODY MASS INDEX: 48.82 KG/M2 | HEIGHT: 65 IN | HEART RATE: 67 BPM

## 2022-01-14 DIAGNOSIS — Z79.4 TYPE 2 DIABETES MELLITUS WITH DIABETIC POLYNEUROPATHY, WITH LONG-TERM CURRENT USE OF INSULIN: Primary | ICD-10-CM

## 2022-01-14 DIAGNOSIS — E11.22 TYPE 2 DM WITH CKD STAGE 4 AND HYPERTENSION: ICD-10-CM

## 2022-01-14 DIAGNOSIS — I12.9 TYPE 2 DM WITH CKD STAGE 4 AND HYPERTENSION: ICD-10-CM

## 2022-01-14 DIAGNOSIS — E11.42 TYPE 2 DIABETES MELLITUS WITH DIABETIC POLYNEUROPATHY, WITH LONG-TERM CURRENT USE OF INSULIN: Primary | ICD-10-CM

## 2022-01-14 DIAGNOSIS — M46.96 UNSPECIFIED INFLAMMATORY SPONDYLOPATHY, LUMBAR REGION: ICD-10-CM

## 2022-01-14 DIAGNOSIS — M86.9 OSTEOMYELITIS OF LEFT FOOT, UNSPECIFIED TYPE: ICD-10-CM

## 2022-01-14 DIAGNOSIS — M86.9 DIABETIC FOOT ULCER WITH OSTEOMYELITIS: ICD-10-CM

## 2022-01-14 DIAGNOSIS — G95.9 CERVICAL MYELOPATHY: ICD-10-CM

## 2022-01-14 DIAGNOSIS — I10 ESSENTIAL HYPERTENSION: ICD-10-CM

## 2022-01-14 DIAGNOSIS — E11.59 HYPERTENSION ASSOCIATED WITH DIABETES: ICD-10-CM

## 2022-01-14 DIAGNOSIS — L97.509 DIABETIC FOOT ULCER WITH OSTEOMYELITIS: ICD-10-CM

## 2022-01-14 DIAGNOSIS — E11.69 DIABETIC FOOT ULCER WITH OSTEOMYELITIS: ICD-10-CM

## 2022-01-14 DIAGNOSIS — I13.0 HYPERTENSIVE HEART AND CHRONIC KIDNEY DISEASE WITH HEART FAILURE AND STAGE 1 THROUGH STAGE 4 CHRONIC KIDNEY DISEASE, OR UNSPECIFIED CHRONIC KIDNEY DISEASE: ICD-10-CM

## 2022-01-14 DIAGNOSIS — I73.9 PAD (PERIPHERAL ARTERY DISEASE): ICD-10-CM

## 2022-01-14 DIAGNOSIS — E11.621 DIABETIC FOOT ULCER WITH OSTEOMYELITIS: ICD-10-CM

## 2022-01-14 DIAGNOSIS — N18.4 TYPE 2 DM WITH CKD STAGE 4 AND HYPERTENSION: ICD-10-CM

## 2022-01-14 DIAGNOSIS — I15.2 HYPERTENSION ASSOCIATED WITH DIABETES: ICD-10-CM

## 2022-01-14 DIAGNOSIS — G63 POLYNEUROPATHY ASSOCIATED WITH UNDERLYING DISEASE: ICD-10-CM

## 2022-01-14 DIAGNOSIS — N25.81 SECONDARY HYPERPARATHYROIDISM: ICD-10-CM

## 2022-01-14 PROBLEM — J44.9 CHRONIC OBSTRUCTIVE PULMONARY DISEASE, UNSPECIFIED COPD TYPE: Status: RESOLVED | Noted: 2022-01-14 | Resolved: 2022-01-14

## 2022-01-14 PROBLEM — J44.9 CHRONIC OBSTRUCTIVE PULMONARY DISEASE, UNSPECIFIED COPD TYPE: Status: ACTIVE | Noted: 2022-01-14

## 2022-01-14 PROCEDURE — 3060F PR POS MICROALBUMINURIA RESULT DOCUMENTED/REVIEW: ICD-10-PCS | Mod: CPTII,S$GLB,, | Performed by: INTERNAL MEDICINE

## 2022-01-14 PROCEDURE — 3288F PR FALLS RISK ASSESSMENT DOCUMENTED: ICD-10-PCS | Mod: CPTII,S$GLB,, | Performed by: INTERNAL MEDICINE

## 2022-01-14 PROCEDURE — 3008F PR BODY MASS INDEX (BMI) DOCUMENTED: ICD-10-PCS | Mod: CPTII,S$GLB,, | Performed by: INTERNAL MEDICINE

## 2022-01-14 PROCEDURE — 3044F PR MOST RECENT HEMOGLOBIN A1C LEVEL <7.0%: ICD-10-PCS | Mod: CPTII,S$GLB,, | Performed by: INTERNAL MEDICINE

## 2022-01-14 PROCEDURE — 99999 PR PBB SHADOW E&M-EST. PATIENT-LVL IV: CPT | Mod: PBBFAC,,, | Performed by: INTERNAL MEDICINE

## 2022-01-14 PROCEDURE — 1101F PR PT FALLS ASSESS DOC 0-1 FALLS W/OUT INJ PAST YR: ICD-10-PCS | Mod: CPTII,S$GLB,, | Performed by: INTERNAL MEDICINE

## 2022-01-14 PROCEDURE — 99214 OFFICE O/P EST MOD 30 MIN: CPT | Mod: S$GLB,,, | Performed by: INTERNAL MEDICINE

## 2022-01-14 PROCEDURE — 3044F HG A1C LEVEL LT 7.0%: CPT | Mod: CPTII,S$GLB,, | Performed by: INTERNAL MEDICINE

## 2022-01-14 PROCEDURE — 3008F BODY MASS INDEX DOCD: CPT | Mod: CPTII,S$GLB,, | Performed by: INTERNAL MEDICINE

## 2022-01-14 PROCEDURE — 3060F POS MICROALBUMINURIA REV: CPT | Mod: CPTII,S$GLB,, | Performed by: INTERNAL MEDICINE

## 2022-01-14 PROCEDURE — 1159F MED LIST DOCD IN RCRD: CPT | Mod: CPTII,S$GLB,, | Performed by: INTERNAL MEDICINE

## 2022-01-14 PROCEDURE — 1160F PR REVIEW ALL MEDS BY PRESCRIBER/CLIN PHARMACIST DOCUMENTED: ICD-10-PCS | Mod: CPTII,S$GLB,, | Performed by: INTERNAL MEDICINE

## 2022-01-14 PROCEDURE — 3288F FALL RISK ASSESSMENT DOCD: CPT | Mod: CPTII,S$GLB,, | Performed by: INTERNAL MEDICINE

## 2022-01-14 PROCEDURE — 3066F NEPHROPATHY DOC TX: CPT | Mod: CPTII,S$GLB,, | Performed by: INTERNAL MEDICINE

## 2022-01-14 PROCEDURE — 99214 PR OFFICE/OUTPT VISIT, EST, LEVL IV, 30-39 MIN: ICD-10-PCS | Mod: S$GLB,,, | Performed by: INTERNAL MEDICINE

## 2022-01-14 PROCEDURE — 3075F PR MOST RECENT SYSTOLIC BLOOD PRESS GE 130-139MM HG: ICD-10-PCS | Mod: CPTII,S$GLB,, | Performed by: INTERNAL MEDICINE

## 2022-01-14 PROCEDURE — 99999 PR PBB SHADOW E&M-EST. PATIENT-LVL IV: ICD-10-PCS | Mod: PBBFAC,,, | Performed by: INTERNAL MEDICINE

## 2022-01-14 PROCEDURE — 3066F PR DOCUMENTATION OF TREATMENT FOR NEPHROPATHY: ICD-10-PCS | Mod: CPTII,S$GLB,, | Performed by: INTERNAL MEDICINE

## 2022-01-14 PROCEDURE — 4010F ACE/ARB THERAPY RXD/TAKEN: CPT | Mod: CPTII,S$GLB,, | Performed by: INTERNAL MEDICINE

## 2022-01-14 PROCEDURE — 1159F PR MEDICATION LIST DOCUMENTED IN MEDICAL RECORD: ICD-10-PCS | Mod: CPTII,S$GLB,, | Performed by: INTERNAL MEDICINE

## 2022-01-14 PROCEDURE — 1125F PR PAIN SEVERITY QUANTIFIED, PAIN PRESENT: ICD-10-PCS | Mod: CPTII,S$GLB,, | Performed by: INTERNAL MEDICINE

## 2022-01-14 PROCEDURE — 1125F AMNT PAIN NOTED PAIN PRSNT: CPT | Mod: CPTII,S$GLB,, | Performed by: INTERNAL MEDICINE

## 2022-01-14 PROCEDURE — 3075F SYST BP GE 130 - 139MM HG: CPT | Mod: CPTII,S$GLB,, | Performed by: INTERNAL MEDICINE

## 2022-01-14 PROCEDURE — 3078F DIAST BP <80 MM HG: CPT | Mod: CPTII,S$GLB,, | Performed by: INTERNAL MEDICINE

## 2022-01-14 PROCEDURE — 3078F PR MOST RECENT DIASTOLIC BLOOD PRESSURE < 80 MM HG: ICD-10-PCS | Mod: CPTII,S$GLB,, | Performed by: INTERNAL MEDICINE

## 2022-01-14 PROCEDURE — 1160F RVW MEDS BY RX/DR IN RCRD: CPT | Mod: CPTII,S$GLB,, | Performed by: INTERNAL MEDICINE

## 2022-01-14 PROCEDURE — 4010F PR ACE/ARB THEARPY RXD/TAKEN: ICD-10-PCS | Mod: CPTII,S$GLB,, | Performed by: INTERNAL MEDICINE

## 2022-01-14 PROCEDURE — 1101F PT FALLS ASSESS-DOCD LE1/YR: CPT | Mod: CPTII,S$GLB,, | Performed by: INTERNAL MEDICINE

## 2022-01-14 RX ORDER — CHLORTHALIDONE 25 MG/1
25 TABLET ORAL DAILY
Qty: 30 TABLET | Refills: 11 | Status: SHIPPED | OUTPATIENT
Start: 2022-01-14 | End: 2023-02-13

## 2022-01-14 RX ORDER — AMLODIPINE BESYLATE 10 MG/1
10 TABLET ORAL DAILY
Qty: 90 TABLET | Refills: 3 | Status: SHIPPED | OUTPATIENT
Start: 2022-01-14 | End: 2023-01-26 | Stop reason: SDUPTHER

## 2022-01-14 RX ORDER — IRBESARTAN 300 MG/1
300 TABLET ORAL NIGHTLY
Qty: 90 TABLET | Refills: 3 | Status: SHIPPED | OUTPATIENT
Start: 2022-01-14 | End: 2022-12-13

## 2022-01-14 NOTE — PROGRESS NOTES
Patient, Annika Mac (MRN #[id number]), presented with a recorded BMI of 50 kg/m^2 consistent with the definition of morbid obesity (ICD-10 E66.01). The patient's morbid obesity was monitored, evaluated, addressed and/or treated. This addendum to the medical record is made on 01/14/2022.

## 2022-01-14 NOTE — PROGRESS NOTES
"Subjective:       Patient ID: Annika Mac is a 71 y.o. female.    Chief Complaint: Hypertension  This is a 71-year-old who presents today for follow-up she has had some fluctuations in her blood pressure at times reports she was out of some of her medications and did need some refills she reports that she has refill of chlorthalidone and a few other sent to her pharmacy.  She has had some issues with her left toe she had a wound that did heal reports after ingrown nail removed and developed infection she is now being treated for osteomyelitis with wound care and home health and hyperbaric wound care along with IV antibiotic she is [facility].  She is getting her treatments daily and her wound continues to improve.  She has had some fluctuations in her blood pressure during that time frame although more recently it seems to be a bit better she finds a bit up with her treatments not sure if it is related to the driving or feeling stressed but her home numbers have been little better although she has need of some of those refills.  She denies exertional dyspnea occasional discomfort relates to her PICC line she would like follow-up with Nephrology and her cardiologist as well she continues to follow with endocrinology in her diabetes has been under better control she is taking 70 30 of insulin and A1c improved.  She did get her COVID treatments    HPI  Review of Systems   Respiratory: Negative for shortness of breath.    Cardiovascular:        Picc line in place  Getting iv antibiotics for ostomyelisi   Toe  And wound care    Neurological:        Occasional headache        Objective:     Blood pressure 132/62, pulse 67, height 5' 5" (1.651 m), weight (!) 136.3 kg (300 lb 7.8 oz), SpO2 99 %.    Physical Exam  Constitutional:       General: She is not in acute distress.     Comments: orophayrnx clear    HENT:      Head: Normocephalic.      Mouth/Throat:      Mouth: " Oropharynx is clear and moist.   Eyes:      General: No scleral icterus.  Cardiovascular:      Rate and Rhythm: Normal rate and regular rhythm.      Heart sounds: Murmur heard.   No friction rub. No gallop.    Pulmonary:      Effort: Pulmonary effort is normal. No respiratory distress.      Breath sounds: Normal breath sounds.   Abdominal:      General: Bowel sounds are normal.      Palpations: Abdomen is soft. There is no mass.      Tenderness: There is no abdominal tenderness.   Musculoskeletal:         General: No edema.      Cervical back: Neck supple.      Comments: Left foot wrapped  Toe  Trace edema    Skin:     Findings: No erythema.   Neurological:      Mental Status: She is alert.   Psychiatric:         Mood and Affect: Mood and affect normal.         Assessment:       1. Type 2 diabetes mellitus with diabetic polyneuropathy, with long-term current use of insulin    2. Hypertension associated with diabetes    3. Osteomyelitis of left foot, unspecified type    4. Essential hypertension    5. Polyneuropathy associated with underlying disease    6. Hypertensive heart and chronic kidney disease with heart failure and stage 1 through stage 4 chronic kidney disease, or unspecified chronic kidney disease    7. Secondary hyperparathyroidism    8. Unspecified inflammatory spondylopathy, lumbar region    9. Cervical myelopathy    10. PAD (peripheral artery disease)    11. Type 2 DM with CKD stage 4 and hypertension    12. Diabetic foot ulcer with osteomyelitis        Plan:       Annika was seen today for hypertension.    Diagnoses and all orders for this visit:    Type 2 diabetes mellitus with diabetic polyneuropathy, with long-term current use of insulin    Hypertension associated with diabetes    Osteomyelitis of left foot, unspecified type    Essential hypertension  -     irbesartan (AVAPRO) 300 MG tablet; Take 1 tablet (300 mg total) by mouth every evening.  -     amLODIPine (NORVASC) 10 MG tablet; Take 1 tablet  (10 mg total) by mouth once daily.  -     Ambulatory referral/consult to Cardiology; Future  -     Ambulatory referral/consult to Nephrology; Future    Polyneuropathy associated with underlying disease    Hypertensive heart and chronic kidney disease with heart failure and stage 1 through stage 4 chronic kidney disease, or unspecified chronic kidney disease  -     Ambulatory referral/consult to Cardiology; Future  -     Ambulatory referral/consult to Nephrology; Future    Secondary hyperparathyroidism    Unspecified inflammatory spondylopathy, lumbar region  Cervical myelopathy  History of she continues conservative measures she remains on Cymbalta and Lyrica for her pain    Anemia multifacorial following with hematology/oncology       Type 2 DM with CKD stage 4 and hypertension    Diabetic foot ulcer with osteomyelitis  Pad  Following with Podiatry and neuropathy precautions she follows with Podiatry Wound Care and Infectious Disease currently getting antibiotics for her osteomyelitis    Other orders  -     chlorthalidone (HYGROTEN) 25 MG Tab; Take 1 tablet (25 mg total) by mouth once daily.    Follow-up in 2 months

## 2022-01-18 ENCOUNTER — HOSPITAL ENCOUNTER (OUTPATIENT)
Dept: WOUND CARE | Facility: HOSPITAL | Age: 72
Discharge: HOME | End: 2022-01-18
Attending: SURGERY
Payer: MEDICARE

## 2022-01-18 ENCOUNTER — LAB VISIT (OUTPATIENT)
Dept: LAB | Facility: HOSPITAL | Age: 72
End: 2022-01-18
Attending: INTERNAL MEDICINE
Payer: MEDICARE

## 2022-01-18 ENCOUNTER — TELEPHONE (OUTPATIENT)
Dept: INFECTIOUS DISEASES | Facility: CLINIC | Age: 72
End: 2022-01-18
Payer: MEDICARE

## 2022-01-18 DIAGNOSIS — M86.10 ACUTE OSTEOMYELITIS: Primary | ICD-10-CM

## 2022-01-18 DIAGNOSIS — M86.672 CHRONIC OSTEOMYELITIS OF HINDFOOT, LEFT: ICD-10-CM

## 2022-01-18 DIAGNOSIS — E11.69 DIABETIC FOOT ULCER WITH OSTEOMYELITIS: ICD-10-CM

## 2022-01-18 DIAGNOSIS — E11.621 DIABETIC FOOT ULCER WITH OSTEOMYELITIS: ICD-10-CM

## 2022-01-18 DIAGNOSIS — M86.10 ACUTE OSTEOMYELITIS: ICD-10-CM

## 2022-01-18 DIAGNOSIS — L97.509 DIABETIC FOOT ULCER WITH OSTEOMYELITIS: ICD-10-CM

## 2022-01-18 DIAGNOSIS — L97.522 ULCER OF LEFT FOOT, WITH FAT LAYER EXPOSED: ICD-10-CM

## 2022-01-18 DIAGNOSIS — M86.9 DIABETIC FOOT ULCER WITH OSTEOMYELITIS: ICD-10-CM

## 2022-01-18 PROCEDURE — 85025 COMPLETE CBC W/AUTO DIFF WBC: CPT | Performed by: INTERNAL MEDICINE

## 2022-01-18 PROCEDURE — 80053 COMPREHEN METABOLIC PANEL: CPT | Performed by: INTERNAL MEDICINE

## 2022-01-18 PROCEDURE — 36415 COLL VENOUS BLD VENIPUNCTURE: CPT | Mod: PO | Performed by: INTERNAL MEDICINE

## 2022-01-18 PROCEDURE — G0277 HBOT, FULL BODY CHAMBER, 30M: HCPCS | Mod: CCAT

## 2022-01-18 PROCEDURE — 86140 C-REACTIVE PROTEIN: CPT | Performed by: INTERNAL MEDICINE

## 2022-01-18 NOTE — PROGRESS NOTES
01/18/22 1333   Hyperbaric Pre-Inspection   Mattress cleaned prior to treatment Yes   2 Patient Identifiers Verified Yes   Consent Obtained Yes   Cotton Gown Yes   Patient Pregnant No   Glasses, Jewelry, Makeup, etc. Removed Yes   Hard contacts removed Yes   Dentures, Hearing Aid, Prosthetic Devices Removed Yes   Touch hair to check for hair spray Yes   Cold/Flu Symptoms Yes   Diabetic Patient Eaten Yes   Medications Given No   Fears and apprehensions verbalized Yes   Ground Wire Secured Yes   HBO Treatment Course Details   Treatment Course Number 2   Total Treatments Ordered 20   Ordering Provider Olya Cruz Chronic refractory osteomyelitis   HBO Treatment Start Date 12/15/21   HBO Treatment Details   Treatment Number 15   Inpatient Visit No   Total Treatment Length Calc (minutes) 107   Chamber Type Monoplace   Chamber # 4   HBO Dive Log # 6233   Treatment Protocol   (2.0 JAYDA x  minutes w/100% oxygen and no air breaks)   Treatment Details   Dive Rate Down 1.5 psi/minute   Dive Rate Up 2.0 psi/minute   Compress Begins 1 JAYDA   Clock Time 0935   Tx Pressure Reached 2 JAYAD   Clock Time 0945   Decompress Begins 2 JAYDA   Clock Time 1115   Decompress Ends 1 JAYDA   Clock Time 1122   Pre HBO Vital Signs   /61   Pulse 70   Resp 16   Temp 96.2 °F (35.7 °C)   Blood Glucose 170   Glucose Meter # HBO   Pain Level 0   Post HBO Vital Signs   /61   Pulse 70   Resp 16   Temp 96.2 °F (35.7 °C)   Blood Glucose 122   Glucose Meter # HBO   Pain Level 0   Ear Evaluation   Left Teed Scale Pre Grade 0   Left Teed Scale Post Grade 0   Right Teed Scale Pre Grade 0   Right Teed Scale Post Grade 0   Physician Supervision  I provided direct supervision and was immediately available to furnish assistance and direction throughout the performance of the procedure.    Emergency Response Team  A trained emergency response team and emergency services were available throughout procedure.  Patient tolerated treatment well.   Continue present treatment plan.  Patient reports no complaints tolerated well.    DX:  E11.621, M86.672, L91.943

## 2022-01-19 ENCOUNTER — TELEPHONE (OUTPATIENT)
Dept: INFECTIOUS DISEASES | Facility: HOSPITAL | Age: 72
End: 2022-01-19
Payer: MEDICARE

## 2022-01-19 ENCOUNTER — HOSPITAL ENCOUNTER (OUTPATIENT)
Dept: WOUND CARE | Facility: HOSPITAL | Age: 72
Discharge: HOME | End: 2022-01-19
Attending: PREVENTIVE MEDICINE
Payer: MEDICARE

## 2022-01-19 ENCOUNTER — EXTERNAL HOME HEALTH (OUTPATIENT)
Dept: HOME HEALTH SERVICES | Facility: HOSPITAL | Age: 72
End: 2022-01-19
Payer: MEDICARE

## 2022-01-19 DIAGNOSIS — L97.522 ULCER OF LEFT FOOT, WITH FAT LAYER EXPOSED: ICD-10-CM

## 2022-01-19 DIAGNOSIS — E11.621 DIABETIC FOOT ULCER WITH OSTEOMYELITIS: ICD-10-CM

## 2022-01-19 DIAGNOSIS — M86.9 DIABETIC FOOT ULCER WITH OSTEOMYELITIS: ICD-10-CM

## 2022-01-19 DIAGNOSIS — L97.509 DIABETIC FOOT ULCER WITH OSTEOMYELITIS: ICD-10-CM

## 2022-01-19 DIAGNOSIS — M86.672 CHRONIC OSTEOMYELITIS OF HINDFOOT, LEFT: ICD-10-CM

## 2022-01-19 DIAGNOSIS — E11.69 DIABETIC FOOT ULCER WITH OSTEOMYELITIS: ICD-10-CM

## 2022-01-19 LAB
ALBUMIN SERPL BCP-MCNC: 3.3 G/DL (ref 3.5–5.2)
ALP SERPL-CCNC: 117 U/L (ref 55–135)
ALT SERPL W/O P-5'-P-CCNC: 14 U/L (ref 10–44)
ANION GAP SERPL CALC-SCNC: 8 MMOL/L (ref 8–16)
AST SERPL-CCNC: 21 U/L (ref 10–40)
BASOPHILS # BLD AUTO: 0.04 K/UL (ref 0–0.2)
BASOPHILS NFR BLD: 0.7 % (ref 0–1.9)
BILIRUB SERPL-MCNC: 0.2 MG/DL (ref 0.1–1)
BUN SERPL-MCNC: 29 MG/DL (ref 8–23)
CALCIUM SERPL-MCNC: 9.6 MG/DL (ref 8.7–10.5)
CHLORIDE SERPL-SCNC: 106 MMOL/L (ref 95–110)
CO2 SERPL-SCNC: 27 MMOL/L (ref 23–29)
CREAT SERPL-MCNC: 1.8 MG/DL (ref 0.5–1.4)
CRP SERPL-MCNC: 11.7 MG/L (ref 0–8.2)
DIFFERENTIAL METHOD: ABNORMAL
EOSINOPHIL # BLD AUTO: 0.5 K/UL (ref 0–0.5)
EOSINOPHIL NFR BLD: 9.3 % (ref 0–8)
ERYTHROCYTE [DISTWIDTH] IN BLOOD BY AUTOMATED COUNT: 15.6 % (ref 11.5–14.5)
EST. GFR  (AFRICAN AMERICAN): 32 ML/MIN/1.73 M^2
EST. GFR  (NON AFRICAN AMERICAN): 27.7 ML/MIN/1.73 M^2
GLUCOSE SERPL-MCNC: 40 MG/DL (ref 70–110)
HCT VFR BLD AUTO: 29.7 % (ref 37–48.5)
HGB BLD-MCNC: 9.1 G/DL (ref 12–16)
IMM GRANULOCYTES # BLD AUTO: 0.02 K/UL (ref 0–0.04)
IMM GRANULOCYTES NFR BLD AUTO: 0.3 % (ref 0–0.5)
LYMPHOCYTES # BLD AUTO: 1.5 K/UL (ref 1–4.8)
LYMPHOCYTES NFR BLD: 25.2 % (ref 18–48)
MCH RBC QN AUTO: 28.7 PG (ref 27–31)
MCHC RBC AUTO-ENTMCNC: 30.6 G/DL (ref 32–36)
MCV RBC AUTO: 94 FL (ref 82–98)
MONOCYTES # BLD AUTO: 0.6 K/UL (ref 0.3–1)
MONOCYTES NFR BLD: 10.2 % (ref 4–15)
NEUTROPHILS # BLD AUTO: 3.1 K/UL (ref 1.8–7.7)
NEUTROPHILS NFR BLD: 54.3 % (ref 38–73)
NRBC BLD-RTO: 0 /100 WBC
PLATELET # BLD AUTO: 227 K/UL (ref 150–450)
PMV BLD AUTO: 11.7 FL (ref 9.2–12.9)
POTASSIUM SERPL-SCNC: 4.2 MMOL/L (ref 3.5–5.1)
PROT SERPL-MCNC: 6.1 G/DL (ref 6–8.4)
RBC # BLD AUTO: 3.17 M/UL (ref 4–5.4)
SODIUM SERPL-SCNC: 141 MMOL/L (ref 136–145)
WBC # BLD AUTO: 5.79 K/UL (ref 3.9–12.7)

## 2022-01-19 PROCEDURE — G0277 HBOT, FULL BODY CHAMBER, 30M: HCPCS | Mod: CCAT

## 2022-01-19 NOTE — PROGRESS NOTES
01/19/22 1029   Hyperbaric Pre-Inspection   Mattress cleaned prior to treatment Yes   2 Patient Identifiers Verified Yes   Consent Obtained Yes   Cotton Gown Yes   Patient voided Yes   Patient Pregnant No   Glasses, Jewelry, Makeup, etc. Removed Yes   Hard contacts removed Yes   Dentures, Hearing Aid, Prosthetic Devices Removed Yes   Touch hair to check for hair spray Yes   Cold/Flu Symptoms No   Diabetic Patient Eaten Yes   Medications Given No   Fears and apprehensions verbalized Yes   Ground Wire Secured Yes   HBO Treatment Course Details   Treatment Course Number 1   Ordering Provider Olya Cruz Chronic refractory osteomyelitis;Diabetic wounds of lower extremities   HBO Treatment Start Date 12/15/21   HBO Treatment Details   Treatment Number 16   Inpatient Visit No   Total Treatment Length Calc (minutes) 107   Chamber Type Monoplace   Chamber # 4   HBO Dive Log # 5341   Treatment Protocol   (2.0 JAYDA x  minutes w/100% oxygen and no air breaks)   Treatment Details   Dive Rate Down 1.5 psi/minute   Dive Rate Up 2.0 psi/minute   Compress Begins 1 JAYDA   Clock Time 0925   Tx Pressure Reached 2 JAYDA   Clock Time 0935   Decompress Begins 2 JAYDA   Clock Time 1105   Decompress Ends 1 JAYDA   Clock Time 1112   Pre HBO Vital Signs   /61   Pulse 73   Resp 16   Temp 96.6 °F (35.9 °C)   Blood Glucose 226   Glucose Meter # HBO   Pain Level 0   Post HBO Vital Signs   BP (!) 122/58   Pulse 70   Resp 16   Temp 96.6 °F (35.9 °C)   Blood Glucose 176   Glucose Meter # HBO   Pain Level 0   Ear Evaluation   Left Teed Scale Pre Grade 0   Left Teed Scale Post Grade 0   Right Teed Scale Pre Grade 0   Right Teed Scale Post Grade 0   Physician Supervision  I provided direct supervision and was immediately available to furnish assistance and direction throughout the performance of the procedure.    Emergency Response Team  A trained emergency response team and emergency services were available throughout procedure.         ICD-10-CM ICD-9-CM   1. Diabetic foot ulcer with osteomyelitis  E11.621 250.80    E11.69 707.15    L97.509 730.27    M86.9 731.8   2. Chronic osteomyelitis of hindfoot, left  M86.672 730.17   3. Ulcer of left foot, with fat layer exposed  L97.522 707.15    Patient tolerated treatment well.  Continue present treatment plan.  Patient reports no complaints, tolerated well.

## 2022-01-19 NOTE — TELEPHONE ENCOUNTER
Informed by lab of critical value glucose 40 from labs drawn yesterday.  Was not able to get a hold of the patient as she was at [facility], however  was able to .  States the patient had been doing well since getting labs drawn yesterday, home glucose checks are 140's.  Noted that patient has an endocrinology appointment 1/21/22.  Advised to continue DM regimen as is as long as patient does not have symptoms of hypoglycemia and glucose checks remain normal.  Advised to discuss with endocrinologist for adjustments if necessary.

## 2022-01-20 ENCOUNTER — HOSPITAL ENCOUNTER (OUTPATIENT)
Dept: WOUND CARE | Facility: HOSPITAL | Age: 72
Discharge: HOME | End: 2022-01-20
Attending: PREVENTIVE MEDICINE
Payer: MEDICARE

## 2022-01-20 DIAGNOSIS — L97.509 DIABETIC FOOT ULCER WITH OSTEOMYELITIS: ICD-10-CM

## 2022-01-20 DIAGNOSIS — M86.9 DIABETIC FOOT ULCER WITH OSTEOMYELITIS: ICD-10-CM

## 2022-01-20 DIAGNOSIS — E11.621 DIABETIC FOOT ULCER WITH OSTEOMYELITIS: ICD-10-CM

## 2022-01-20 DIAGNOSIS — E11.69 DIABETIC FOOT ULCER WITH OSTEOMYELITIS: ICD-10-CM

## 2022-01-20 DIAGNOSIS — L97.522 ULCER OF LEFT FOOT, WITH FAT LAYER EXPOSED: ICD-10-CM

## 2022-01-20 DIAGNOSIS — M86.672 CHRONIC OSTEOMYELITIS OF HINDFOOT, LEFT: ICD-10-CM

## 2022-01-20 PROCEDURE — G0277 HBOT, FULL BODY CHAMBER, 30M: HCPCS | Mod: CCAT

## 2022-01-20 NOTE — PROGRESS NOTES
01/20/22 1031   Hyperbaric Pre-Inspection   Mattress cleaned prior to treatment Yes   2 Patient Identifiers Verified Yes   Consent Obtained Yes   Cotton Gown Yes   Patient voided Yes   Patient Pregnant No   Glasses, Jewelry, Makeup, etc. Removed Yes   Hard contacts removed No   Dentures, Hearing Aid, Prosthetic Devices Removed Yes   Touch hair to check for hair spray Yes   Cold/Flu Symptoms No   Diabetic Patient Eaten Yes   Medications Given No   Fears and apprehensions verbalized Yes   Ground Wire Secured Yes   HBO Treatment Course Details   Treatment Course Number 1   Total Treatments Ordered 20   Ordering Provider Dobson   Indications Chronic refractory osteomyelitis;Diabetic wounds of lower extremities   HBO Treatment Start Date 12/15/21   HBO Treatment Details   Treatment Number 17   Inpatient Visit No   Total Treatment Length Calc (minutes) 107   Chamber Type Monoplace   Chamber # 4   HBO Dive Log # [id number]   Treatment Protocol   (2.0 JAYDA x  minutes w/100% oxygen and no air breaks)   Treatment Details   Dive Rate Down 1.5 psi/minute   Dive Rate Up 2.0 psi/minute   Compress Begins 1 JAYDA   Clock Time 1005   Tx Pressure Reached 2 JAYDA   Clock Time 1015   Decompress Begins 2 JAYDA   Clock Time 1145   Decompress Ends 1 JAYDA   Clock Time 1152   Pre HBO Vital Signs   BP (!) 169/76   Pulse 83   Resp 16   Temp 97.2 °F (36.2 °C)   Blood Glucose 188   Glucose Meter # HBO   Pain Level 0   Post HBO Vital Signs   /60   Pulse 79   Resp 16   Temp 97.2 °F (36.2 °C)   Blood Glucose 174   Glucose Meter # HBO   Pain Level 0   Ear Evaluation   Left Teed Scale Pre Grade 0   Left Teed Scale Post Grade 0   Right Teed Scale Pre Grade 0   Right Teed Scale Post Grade 0   Physician Supervision  I provided direct supervision and was immediately available to furnish assistance and direction throughout the performance of the procedure.    Emergency Response Team  A trained emergency response team and emergency services were  available throughout procedure.  Patient tolerated treatment well.  Continue present treatment plan.  Patient reports no complaints tolerated well.    DX:  E11.621, M86.672, L97.390

## 2022-01-21 ENCOUNTER — OFFICE VISIT (OUTPATIENT)
Dept: ENDOCRINOLOGY | Facility: CLINIC | Age: 72
End: 2022-01-21
Payer: MEDICARE

## 2022-01-21 ENCOUNTER — HOSPITAL ENCOUNTER (OUTPATIENT)
Dept: WOUND CARE | Facility: HOSPITAL | Age: 72
Discharge: HOME | End: 2022-01-21
Attending: PREVENTIVE MEDICINE
Payer: MEDICARE

## 2022-01-21 VITALS
DIASTOLIC BLOOD PRESSURE: 63 MMHG | BODY MASS INDEX: 48.82 KG/M2 | HEIGHT: 65 IN | SYSTOLIC BLOOD PRESSURE: 154 MMHG | HEART RATE: 70 BPM | WEIGHT: 293 LBS | TEMPERATURE: 98 F

## 2022-01-21 VITALS
RESPIRATION RATE: 16 BRPM | WEIGHT: 293 LBS | SYSTOLIC BLOOD PRESSURE: 120 MMHG | DIASTOLIC BLOOD PRESSURE: 60 MMHG | BODY MASS INDEX: 48.82 KG/M2 | HEIGHT: 65 IN | HEART RATE: 70 BPM | OXYGEN SATURATION: 99 %

## 2022-01-21 DIAGNOSIS — D63.1 ANEMIA IN STAGE 3B CHRONIC KIDNEY DISEASE: ICD-10-CM

## 2022-01-21 DIAGNOSIS — N18.32 ANEMIA IN STAGE 3B CHRONIC KIDNEY DISEASE: ICD-10-CM

## 2022-01-21 DIAGNOSIS — L97.509 DIABETIC FOOT ULCER WITH OSTEOMYELITIS: Primary | ICD-10-CM

## 2022-01-21 DIAGNOSIS — N18.4 TYPE 2 DM WITH CKD STAGE 4 AND HYPERTENSION: Chronic | ICD-10-CM

## 2022-01-21 DIAGNOSIS — E11.621 DIABETIC FOOT ULCER WITH OSTEOMYELITIS: Primary | ICD-10-CM

## 2022-01-21 DIAGNOSIS — N06.9 ISOLATED PROTEINURIA WITH MORPHOLOGIC LESION: ICD-10-CM

## 2022-01-21 DIAGNOSIS — E11.22 TYPE 2 DM WITH CKD STAGE 4 AND HYPERTENSION: Chronic | ICD-10-CM

## 2022-01-21 DIAGNOSIS — M86.9 DIABETIC FOOT ULCER WITH OSTEOMYELITIS: Primary | ICD-10-CM

## 2022-01-21 DIAGNOSIS — E11.649 HYPOGLYCEMIA UNAWARENESS ASSOCIATED WITH TYPE 2 DIABETES MELLITUS: Chronic | ICD-10-CM

## 2022-01-21 DIAGNOSIS — I12.9 TYPE 2 DM WITH CKD STAGE 4 AND HYPERTENSION: Chronic | ICD-10-CM

## 2022-01-21 DIAGNOSIS — E11.42 TYPE 2 DIABETES MELLITUS WITH DIABETIC POLYNEUROPATHY, WITH LONG-TERM CURRENT USE OF INSULIN: Primary | ICD-10-CM

## 2022-01-21 DIAGNOSIS — Z79.4 TYPE 2 DIABETES MELLITUS WITH DIABETIC POLYNEUROPATHY, WITH LONG-TERM CURRENT USE OF INSULIN: Primary | ICD-10-CM

## 2022-01-21 DIAGNOSIS — E11.69 DIABETIC FOOT ULCER WITH OSTEOMYELITIS: Primary | ICD-10-CM

## 2022-01-21 DIAGNOSIS — L97.522 ULCER OF LEFT FOOT, WITH FAT LAYER EXPOSED: ICD-10-CM

## 2022-01-21 PROBLEM — R80.0 ISOLATED PROTEINURIA: Status: ACTIVE | Noted: 2022-01-21

## 2022-01-21 PROCEDURE — 3066F PR DOCUMENTATION OF TREATMENT FOR NEPHROPATHY: ICD-10-PCS | Mod: CPTII,S$GLB,, | Performed by: INTERNAL MEDICINE

## 2022-01-21 PROCEDURE — 99499 RISK ADDL DX/OHS AUDIT: ICD-10-PCS | Mod: S$GLB,,, | Performed by: INTERNAL MEDICINE

## 2022-01-21 PROCEDURE — 3044F HG A1C LEVEL LT 7.0%: CPT | Mod: CPTII,S$GLB,, | Performed by: INTERNAL MEDICINE

## 2022-01-21 PROCEDURE — 3060F POS MICROALBUMINURIA REV: CPT | Mod: CPTII,S$GLB,, | Performed by: INTERNAL MEDICINE

## 2022-01-21 PROCEDURE — 99214 OFFICE O/P EST MOD 30 MIN: CPT | Mod: S$GLB,,, | Performed by: INTERNAL MEDICINE

## 2022-01-21 PROCEDURE — 3078F PR MOST RECENT DIASTOLIC BLOOD PRESSURE < 80 MM HG: ICD-10-PCS | Mod: CPTII,S$GLB,, | Performed by: INTERNAL MEDICINE

## 2022-01-21 PROCEDURE — 3008F PR BODY MASS INDEX (BMI) DOCUMENTED: ICD-10-PCS | Mod: CPTII,S$GLB,, | Performed by: INTERNAL MEDICINE

## 2022-01-21 PROCEDURE — 1126F PR PAIN SEVERITY QUANTIFIED, NO PAIN PRESENT: ICD-10-PCS | Mod: CPTII,S$GLB,, | Performed by: INTERNAL MEDICINE

## 2022-01-21 PROCEDURE — 3008F BODY MASS INDEX DOCD: CPT | Mod: CPTII,S$GLB,, | Performed by: INTERNAL MEDICINE

## 2022-01-21 PROCEDURE — 4010F PR ACE/ARB THEARPY RXD/TAKEN: ICD-10-PCS | Mod: CPTII,S$GLB,, | Performed by: INTERNAL MEDICINE

## 2022-01-21 PROCEDURE — 99499 UNLISTED E&M SERVICE: CPT | Mod: S$GLB,,, | Performed by: INTERNAL MEDICINE

## 2022-01-21 PROCEDURE — 3044F PR MOST RECENT HEMOGLOBIN A1C LEVEL <7.0%: ICD-10-PCS | Mod: CPTII,S$GLB,, | Performed by: INTERNAL MEDICINE

## 2022-01-21 PROCEDURE — 3288F PR FALLS RISK ASSESSMENT DOCUMENTED: ICD-10-PCS | Mod: CPTII,S$GLB,, | Performed by: INTERNAL MEDICINE

## 2022-01-21 PROCEDURE — 3074F SYST BP LT 130 MM HG: CPT | Mod: CPTII,S$GLB,, | Performed by: INTERNAL MEDICINE

## 2022-01-21 PROCEDURE — 4010F ACE/ARB THERAPY RXD/TAKEN: CPT | Mod: CPTII,S$GLB,, | Performed by: INTERNAL MEDICINE

## 2022-01-21 PROCEDURE — 99213 OFFICE O/P EST LOW 20 MIN: CPT

## 2022-01-21 PROCEDURE — 3288F FALL RISK ASSESSMENT DOCD: CPT | Mod: CPTII,S$GLB,, | Performed by: INTERNAL MEDICINE

## 2022-01-21 PROCEDURE — 3078F DIAST BP <80 MM HG: CPT | Mod: CPTII,S$GLB,, | Performed by: INTERNAL MEDICINE

## 2022-01-21 PROCEDURE — 1101F PT FALLS ASSESS-DOCD LE1/YR: CPT | Mod: CPTII,S$GLB,, | Performed by: INTERNAL MEDICINE

## 2022-01-21 PROCEDURE — 99999 PR PBB SHADOW E&M-EST. PATIENT-LVL V: ICD-10-PCS | Mod: PBBFAC,,, | Performed by: INTERNAL MEDICINE

## 2022-01-21 PROCEDURE — 99999 PR PBB SHADOW E&M-EST. PATIENT-LVL V: CPT | Mod: PBBFAC,,, | Performed by: INTERNAL MEDICINE

## 2022-01-21 PROCEDURE — 99214 PR OFFICE/OUTPT VISIT, EST, LEVL IV, 30-39 MIN: ICD-10-PCS | Mod: S$GLB,,, | Performed by: INTERNAL MEDICINE

## 2022-01-21 PROCEDURE — 1101F PR PT FALLS ASSESS DOC 0-1 FALLS W/OUT INJ PAST YR: ICD-10-PCS | Mod: CPTII,S$GLB,, | Performed by: INTERNAL MEDICINE

## 2022-01-21 PROCEDURE — 1126F AMNT PAIN NOTED NONE PRSNT: CPT | Mod: CPTII,S$GLB,, | Performed by: INTERNAL MEDICINE

## 2022-01-21 PROCEDURE — 3066F NEPHROPATHY DOC TX: CPT | Mod: CPTII,S$GLB,, | Performed by: INTERNAL MEDICINE

## 2022-01-21 PROCEDURE — 3074F PR MOST RECENT SYSTOLIC BLOOD PRESSURE < 130 MM HG: ICD-10-PCS | Mod: CPTII,S$GLB,, | Performed by: INTERNAL MEDICINE

## 2022-01-21 PROCEDURE — 3060F PR POS MICROALBUMINURIA RESULT DOCUMENTED/REVIEW: ICD-10-PCS | Mod: CPTII,S$GLB,, | Performed by: INTERNAL MEDICINE

## 2022-01-21 RX ORDER — PREGABALIN 150 MG/1
150 CAPSULE ORAL 2 TIMES DAILY
Qty: 60 CAPSULE | Refills: 6 | Status: SHIPPED | OUTPATIENT
Start: 2022-01-21 | End: 2022-06-15 | Stop reason: SDUPTHER

## 2022-01-21 RX ORDER — INSULIN LISPRO 100 [IU]/ML
10 INJECTION, SOLUTION INTRAVENOUS; SUBCUTANEOUS
Qty: 27 ML | Refills: 3 | Status: SHIPPED | OUTPATIENT
Start: 2022-01-21 | End: 2022-04-18 | Stop reason: SDUPTHER

## 2022-01-21 RX ORDER — INSULIN DEGLUDEC 100 U/ML
30 INJECTION, SOLUTION SUBCUTANEOUS DAILY
Qty: 9 ML | Refills: 3 | Status: SHIPPED | OUTPATIENT
Start: 2022-01-21 | End: 2022-04-18 | Stop reason: SDUPTHER

## 2022-01-21 RX ORDER — PEN NEEDLE, DIABETIC 31 GX5/16"
NEEDLE, DISPOSABLE MISCELLANEOUS
Qty: 200 EACH | Refills: 20 | Status: SHIPPED | OUTPATIENT
Start: 2022-01-21 | End: 2023-08-14

## 2022-01-21 NOTE — PROGRESS NOTES
FOLLOW-UP VISIT    Subjective:      Chief Complaint: Follow-up for diabetes and obesity      HPI: Annika Mac is a 71 y.o. female who is here for a follow-up evaluation for type 2 diabetes    The patient's last visit with me was on 9/13/2021.      Past Medical History:   Diagnosis Date    Asthma     Chronic obstructive pulmonary disease, unspecified COPD type 1/14/2022    Constipation 10/3/2019    Deep vein thrombophlebitis of left leg 7/27/2012    Deep vein thrombosis     when she had a knee replacement    Encounter for blood transfusion     anemic     GERD (gastroesophageal reflux disease)     Obesity, diabetes, and hypertension syndrome 2/13/2019    Obstructive sleep apnea 7/27/2012    PAD (peripheral artery disease) 11/21/2013    Pressure ulcer of toe of left foot     Type 2 diabetes mellitus with obesity 8/19/2020    Type 2 diabetes mellitus with peripheral artery disease 8/19/2020         With regards to the diabetes, obesity:    She was diagnosed with an ulcer to her left second toe in August, 2021 after having her toenail removed.  She was ultimately found to have evidence of osteomyelitis in underwent six weeks of IV antibiotics (Zosyn) along with hyperbaric oxygen therapy.  She just completed the antibiotics and will be having her PICC line removed shortly.  She has follow-up today to discuss whether she will need additional HBOT     Diabetes history:  Diagnosed: 1987.   Started oral agents then NPH and Regular insulin.   Converted to MDI with Lantus and Novolog in 2/2006 after knee surgery.   D/c TZD due to weight gain 7/08 and added Symlin. Stopped Symlin 2/09.   Januvia added 2/10. D/C Januvia 12/10 r/t cost; resumed though pt assistance in 2016.   Converted to U500 in 7/10. Now using Humalog 70/30 due to financial constraints.  However, she recently switched insurances to people self and is currently not paying anything for her insulin or Ozempic.      Known complications:  Peripheral  neuropathy  Nephropathy  Toe ulcer with osteomyelitis - currently being seen at wound care and undergoing HBOT.  Toe is healing well.  Hypoglycemia       Current regimen:  1. Insulin 70/30: Injected 30 minutes before meals.    Breakfast:  70 units    Dinner:  35 units  2.   Ozempic 1 mg once weekly      Other agents tried:  · NPH/R  · U-500 insulin  · Glargine, detemir, aspart  · Januvia  · Symlin  · Actos    Hypoglycemia:  See above  Knows how to correct with 15 grams of carbs- juice, coke, or glucose tablets.     Denies missed doses.  Has Medicare Extra Help     Eats 3 meals daily, + snacks  Breakfast:  Grits/eggs/sausage georgie  Lunch:  Mashed potatoes, baked fish, corn, salad  Dinner:  Half cup rice, beans, baked chicken and greens.  Drinks:  Water, unsweet tea; Gatorade zero.    No formal exercise. Doing some exercises while seated.     Education - last visit:  5/4/2021    She is monitoring her blood glucose by fingerstick 4 times daily.  She does have Dexcom but has not been able to use it due to having HBOT. Blood sugars are scanned into the media tab.  Mostly running mid 100s although she did have some episodes of hypoglycemia.  To of these episodes occurred during the day.  She postulates that she may not have eaten as much for breakfast those days.  She has also had some low sugars overnight/early in the morning.    Diabetes Management Status    Statin: Taking  ACE/ARB: Taking    Screening or Prevention Patient's value Goal Complete/Controlled?   HgA1C Testing and Control   Lab Results   Component Value Date    HGBA1C 6.6 (H) 01/07/2022      Annually/Less than 8% Yes   Lipid profile : 01/07/2022 Annually Yes   LDL control Lab Results   Component Value Date    LDLCALC 80.0 01/07/2022    Annually/Less than 100 mg/dl  Yes   Nephropathy screening Lab Results   Component Value Date    LABMICR 424.0 01/07/2022     Lab Results   Component Value Date    PROTEINUA Negative 11/13/2019     Lab Results   Component  Value Date    UTPCR Unable to calculate 11/13/2019      Annually Yes   Blood pressure BP Readings from Last 1 Encounters:   01/21/22 (!) 154/63    Less than 140/90 Yes   Dilated retinal exam : 03/22/2021 Annually Yes   Foot exam   : 09/10/2021 Annually Yes         Blood pressure at home running higher lately. 120-160/60-70      Reviewed past medical, family, social history and updated as appropriate.        Objective:     Vitals:    01/21/22 0946   BP: 120/60   Pulse: 70   Resp: 16     BP Readings from Last 5 Encounters:   01/21/22 (!) 154/63   01/21/22 (!) 156/70   01/21/22 120/60   01/14/22 132/62   01/03/22 (!) 194/81       Physical Exam  Vitals and nursing note reviewed.   Constitutional:       General: She is not in acute distress.     Appearance: She is well-developed.   HENT:      Head: Normocephalic and atraumatic.   Eyes:      General:         Right eye: No discharge.         Left eye: No discharge.      Conjunctiva/sclera: Conjunctivae normal.   Neck:      Thyroid: No thyromegaly.      Trachea: No tracheal deviation.   Cardiovascular:      Rate and Rhythm: Normal rate.   Pulmonary:      Effort: Pulmonary effort is normal. No respiratory distress.   Musculoskeletal:      Comments: No digital clubbing or extremity cyanosis   Neurological:      Mental Status: She is alert and oriented to person, place, and time.      Coordination: Coordination normal.   Psychiatric:         Behavior: Behavior normal.         Wt Readings from Last 50 Encounters:   01/21/22 133.8 kg (295 lb)   01/21/22 134.2 kg (295 lb 15.5 oz)   01/14/22 (!) 136.3 kg (300 lb 7.8 oz)   01/03/22 135.6 kg (299 lb)   12/22/21 136 kg (299 lb 13.2 oz)   12/22/21 (!) 136.9 kg (301 lb 13 oz)   12/18/21 (!) 138.3 kg (305 lb)   12/13/21 (!) 138.3 kg (305 lb)   12/08/21 (!) 138.4 kg (305 lb 1.9 oz)   12/03/21 (!) 138.4 kg (305 lb 1.9 oz)   11/23/21 133.8 kg (294 lb 15.6 oz)   11/15/21 133.8 kg (295 lb)   11/03/21 133.8 kg (295 lb)   10/28/21 (!) 137.9 kg  (304 lb 0.2 oz)   10/25/21 (!) 137.9 kg (304 lb)   10/13/21 (!) 138.3 kg (304 lb 14.3 oz)   09/27/21 (!) 138.3 kg (305 lb)   09/15/21 (!) 137.2 kg (302 lb 7.5 oz)   09/13/21 (!) 137.4 kg (302 lb 14.6 oz)   09/13/21 (!) 136.6 kg (301 lb 2.4 oz)   08/13/21 (!) 142.2 kg (313 lb 7.9 oz)   08/05/21 (!) 142.2 kg (313 lb 7.9 oz)   07/08/21 (!) 142.2 kg (313 lb 7.9 oz)   06/23/21 (!) 142.2 kg (313 lb 7.9 oz)   06/02/21 (!) 140.9 kg (310 lb 9.6 oz)   05/21/21 (!) 142.9 kg (315 lb 0.6 oz)   05/17/21 (!) 142.9 kg (315 lb 0.6 oz)   05/06/21 95.3 kg (210 lb)   04/15/21 (!) 145.6 kg (320 lb 15.8 oz)   04/07/21 (!) 145.6 kg (321 lb)   03/30/21 (!) 146.5 kg (323 lb)   03/25/21 (!) 146.6 kg (323 lb 3.1 oz)   03/25/21 (!) 146.6 kg (323 lb 3.1 oz)   03/22/21 (!) 142.2 kg (313 lb 7.9 oz)   03/15/21 (!) 141.8 kg (312 lb 9.6 oz)   02/24/21 (!) 142.9 kg (315 lb 0.6 oz)   02/24/21 (!) 142.9 kg (315 lb 0.6 oz)   01/15/21 (!) 142.9 kg (315 lb)   01/08/21 (!) 141.5 kg (312 lb)   01/04/21 (!) 141.5 kg (312 lb)   12/23/20 (!) 143.7 kg (316 lb 12.8 oz)   12/15/20 (!) 143.7 kg (316 lb 12.8 oz)   12/15/20 (!) 145.3 kg (320 lb 5.3 oz)   11/17/20 (!) 141.5 kg (312 lb)   11/04/20 (!) 141.5 kg (312 lb)   11/02/20 (!) 143.5 kg (316 lb 5.8 oz)   11/02/20 (!) 144.2 kg (317 lb 12.7 oz)   10/24/20 (!) 143.3 kg (316 lb)   10/21/20 (!) 143.6 kg (316 lb 11.1 oz)   10/14/20 (!) 143.4 kg (316 lb 3.2 oz)       Lab Results   Component Value Date    HGBA1C 6.6 (H) 01/07/2022     Lab Results   Component Value Date    CHOL 138 01/07/2022    HDL 38 (L) 01/07/2022    LDLCALC 80.0 01/07/2022    TRIG 100 01/07/2022    CHOLHDL 27.5 01/07/2022     Lab Results   Component Value Date     01/18/2022    K 4.2 01/18/2022     01/18/2022    CO2 27 01/18/2022    GLU 40 (LL) 01/18/2022    BUN 29 (H) 01/18/2022    CREATININE 1.8 (H) 01/18/2022    CALCIUM 9.6 01/18/2022    PROT 6.1 01/18/2022    ALBUMIN 3.3 (L) 01/18/2022    BILITOT 0.2 01/18/2022    ALKPHOS 117  01/18/2022    AST 21 01/18/2022    ALT 14 01/18/2022    ANIONGAP 8 01/18/2022    ESTGFRAFRICA 32.0 (A) 01/18/2022    EGFRNONAA 27.7 (A) 01/18/2022    TSH 1.365 09/08/2020      Lab Results   Component Value Date    MICALBCREAT 270.1 (H) 01/07/2022       Assessment/Plan:     Anemia in chronic kidney disease (CKD)  This may artificially lower her A1c.    Isolated proteinuria  Urine protein had previously been normal.  Given the recent events with IV antibiotics and hyperbaric oxygen therapy, I am going to repeat the urine protein level before we make any changes to her regimen.  If it remains elevated, we will consider adding an SGLT2 inhibitor to her regimen.    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  Patient requires continuous glucose monitoring with Dexcom G6 due to frequent hypoglycemia and hypoglycemia unawareness.  Due to the COVID19 pandemic, it is medically necessary for the patient to have Dexcom to manage hypo and hyperglycemia.      Type 2 DM with CKD stage 4 and hypertension  Reviewed goals of therapy are to get the best control we can without hypoglycemia.  Options for therapy are limited by chronic kidney disease and previous hypoglycemia.  Given the recent change in insurance, I believe she will have access to analog insulin again.  She is agreeable to switching back to multiple daily injections assuming this is covered from a cost standpoint.      Medication changes:   Stop:    · Humulin 70/30 insulin - 70/35   Start:  · Tresiba 30 units daily  · Humalog 10 units t.i.d. with meals  Continue:  · Ozempic 1 mg weekly       Reviewed patient's current insulin regimen. Clarified proper insulin dose and timing in relation to meals, etc. Insulin injection sites and proper rotation instructed.      Advised frequent self blood glucose monitoring. Patient encouraged to document glucose results and bring them to every clinic visit.    Hypoglycemia precautions discussed.  Instructed on precautions before driving.      Close adherence to lifestyle changes recommended.      Health maintenance topics addressed above.        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    Ulcer of left foot, with fat layer exposed  Completed IV antibiotics.  She is following with hyperbarics and wound care clinic.    Schedule Diabetes Education in four weeks  RTC in 4 months with lab work.

## 2022-01-21 NOTE — ASSESSMENT & PLAN NOTE
Andrew Laureano Reviewed goals of therapy are to get the best control we can without hypoglycemia.  Options for therapy are limited by chronic kidney disease and previous hypoglycemia.  Given the recent change in insurance, I believe she will have access to analog insulin again.  She is agreeable to switching back to multiple daily injections assuming this is covered from a cost standpoint.      Medication changes:   Stop:    · Humulin 70/30 insulin - 70/35   Start:  · Tresiba 30 units daily  · Humalog 10 units t.i.d. with meals  Continue:  · Ozempic 1 mg weekly       Reviewed patient's current insulin regimen. Clarified proper insulin dose and timing in relation to meals, etc. Insulin injection sites and proper rotation instructed.      Advised frequent self blood glucose monitoring. Patient encouraged to document glucose results and bring them to every clinic visit.    Hypoglycemia precautions discussed. Instructed on precautions before driving.      Close adherence to lifestyle changes recommended.      Health maintenance topics addressed above.        Lab Results   Component Value Date    HGBA1C 6.6 (H) 01/07/2022    HGBA1C 7.1 (H) 11/08/2021    HGBA1C 6.6 (H) 06/23/2021        study         Answers for HPI/ROS submitted by the patient on 6/28/2021  What is the primary reason for your visit?: Physical

## 2022-01-21 NOTE — ASSESSMENT & PLAN NOTE
See above.  Patient requires continuous glucose monitoring with Dexcom G6 due to frequent hypoglycemia and hypoglycemia unawareness.  Due to the COVID19 pandemic, it is medically necessary for the patient to have Dexcom to manage hypo and hyperglycemia.

## 2022-01-21 NOTE — PROGRESS NOTES
Subjective:       Patient ID: Annika Mac is a 71 y.o. female.    Chief Complaint: Non-healing Wound Follow Up    1/21/22: Follow up for left 2nd toe diabetic foot ulcer. Wound resolved. No complaints. Recommended to continue HBO treatments as scheduled due to recent lab work elevated  inflammatory  markers. Verbalized understanding.     Review of Systems   All other systems reviewed and are negative.        Objective:      Temp:  [97.3 °F (36.3 °C)-98.4 °F (36.9 °C)]   Pulse:  [66-70]   Resp:  [16]   BP: (120-156)/(60-70)   SpO2:  [98 %-99 %]   Physical Exam  [REMOVED]      Wound 10/25/21 0810 Left anterior Toe, second (Removed)   10/25/21 0810    Pre-existing: Yes   Primary Wound Type:    Side: Left   Orientation: anterior   Location: Toe, second   Wound Number:    Ankle-Brachial Index:    Pulses:    Removal Indication and Assessment:    Wound Outcome: Healed   (Retired) Wound Type:    (Retired) Wound Length (cm):    (Retired) Wound Width (cm):    (Retired) Depth (cm):    Wound Description (Comments):    Removal Indications:    Removed 01/21/22 1513   Wound Image   01/21/22 1500   Dressing Appearance Dry;Intact 01/21/22 1500   Appearance Closed/resurfaced 01/21/22 1500   Periwound Area Dry 01/21/22 1500   Wound Edges Rolled/closed 01/21/22 1500   Wound Length (cm) 0 cm 01/21/22 1500   Wound Width (cm) 0 cm 01/21/22 1500   Wound Depth (cm) 0 cm 01/21/22 1500   Wound Volume (cm^3) 0 cm^3 01/21/22 1500   Wound Surface Area (cm^2) 0 cm^2 01/21/22 1500   Care Cleansed with:;Soap and water 01/21/22 1500   Dressing Applied;Foam;Tubular bandage 01/21/22 1500   Periwound Care Moisturizer applied 01/21/22 1500   Compression Tubular elasticized bandage 01/21/22 1500   Off Loading Off loading shoe 01/21/22 1500   Dressing Change Due 01/22/22 01/21/22 1500         Assessment:         ICD-10-CM ICD-9-CM   1. Diabetic foot ulcer with osteomyelitis  E11.621 250.80    E11.69 707.15    L97.509 730.27    M86.9 731.8          Plan:   Tissue pathology and/or culture taken:  [] Yes [x] No   Sharp debridement performed:   [] Yes [x] No   Labs ordered this visit:   [] Yes [x] No   Imaging ordered this visit:   [] Yes [x] No     Needs ID follow up      Orders Placed This Encounter   Procedures    Change dressing     Left 2nd Toe Diabetic Foot Ulcer (Resolved)  Cleanse with soap and water daily. Pat dry. Apply moisturizer daily per patient.  Continue HBO treatments as scheduled.        Follow up for Continue with HBO treatments.

## 2022-01-21 NOTE — ASSESSMENT & PLAN NOTE
Urine protein had previously been normal.  Given the recent events with IV antibiotics and hyperbaric oxygen therapy, I am going to repeat the urine protein level before we make any changes to her regimen.  If it remains elevated, we will consider adding an SGLT2 inhibitor to her regimen.

## 2022-01-24 ENCOUNTER — TELEPHONE (OUTPATIENT)
Dept: INFECTIOUS DISEASES | Facility: CLINIC | Age: 72
End: 2022-01-24
Payer: MEDICARE

## 2022-01-24 NOTE — TELEPHONE ENCOUNTER
Pt states that she cannot have her other hyperbarricks appointment until she sees you, I'm trying to schedule around her other appointments but they don't coincide with your schedule. Please advise.

## 2022-01-24 NOTE — TELEPHONE ENCOUNTER
----- Message from Della Ozuna sent at 1/24/2022  9:48 AM CST -----  Regarding: pt  Patient Requesting Sooner Appointment.     Reason for sooner appt.: fu   When is the first available appointment? N/A   Communication Preference: can you please call pt at [phone]  Additional Information: none    SIOBHAN

## 2022-01-26 ENCOUNTER — PATIENT MESSAGE (OUTPATIENT)
Dept: RHEUMATOLOGY | Facility: CLINIC | Age: 72
End: 2022-01-26
Payer: MEDICARE

## 2022-01-26 NOTE — TELEPHONE ENCOUNTER
Called pt, no answer. Left vm stating that she was ok to continue hyperbarics without an ID consult per the hyperbaric clinic.

## 2022-01-27 ENCOUNTER — TELEPHONE (OUTPATIENT)
Dept: ENDOCRINOLOGY | Facility: CLINIC | Age: 72
End: 2022-01-27
Payer: MEDICARE

## 2022-02-17 ENCOUNTER — LAB VISIT (OUTPATIENT)
Dept: LAB | Facility: HOSPITAL | Age: 72
End: 2022-02-17
Attending: INTERNAL MEDICINE
Payer: MEDICARE

## 2022-02-17 ENCOUNTER — CLINICAL SUPPORT (OUTPATIENT)
Dept: DIABETES | Facility: CLINIC | Age: 72
End: 2022-02-17
Payer: MEDICARE

## 2022-02-17 ENCOUNTER — OFFICE VISIT (OUTPATIENT)
Dept: INTERNAL MEDICINE | Facility: CLINIC | Age: 72
End: 2022-02-17
Payer: MEDICARE

## 2022-02-17 VITALS
BODY MASS INDEX: 48.82 KG/M2 | WEIGHT: 293 LBS | DIASTOLIC BLOOD PRESSURE: 70 MMHG | OXYGEN SATURATION: 99 % | SYSTOLIC BLOOD PRESSURE: 132 MMHG | HEART RATE: 83 BPM | HEIGHT: 65 IN | RESPIRATION RATE: 18 BRPM

## 2022-02-17 DIAGNOSIS — I15.2 HYPERTENSION ASSOCIATED WITH DIABETES: ICD-10-CM

## 2022-02-17 DIAGNOSIS — E11.59 HYPERTENSION ASSOCIATED WITH DIABETES: ICD-10-CM

## 2022-02-17 DIAGNOSIS — M47.816 LUMBAR FACET ARTHROPATHY: ICD-10-CM

## 2022-02-17 DIAGNOSIS — Z79.4 TYPE 2 DIABETES MELLITUS WITH DIABETIC POLYNEUROPATHY, WITH LONG-TERM CURRENT USE OF INSULIN: Primary | ICD-10-CM

## 2022-02-17 DIAGNOSIS — M86.672 OSTEOMYELITIS, CHRONIC, ANKLE OR FOOT, LEFT: ICD-10-CM

## 2022-02-17 DIAGNOSIS — E11.42 TYPE 2 DIABETES MELLITUS WITH DIABETIC POLYNEUROPATHY, WITH LONG-TERM CURRENT USE OF INSULIN: ICD-10-CM

## 2022-02-17 DIAGNOSIS — E11.621 DIABETIC FOOT ULCER WITH OSTEOMYELITIS: ICD-10-CM

## 2022-02-17 DIAGNOSIS — E11.69 DIABETIC FOOT ULCER WITH OSTEOMYELITIS: ICD-10-CM

## 2022-02-17 DIAGNOSIS — L97.509 DIABETIC FOOT ULCER WITH OSTEOMYELITIS: ICD-10-CM

## 2022-02-17 DIAGNOSIS — M86.9 DIABETIC FOOT ULCER WITH OSTEOMYELITIS: ICD-10-CM

## 2022-02-17 DIAGNOSIS — Z86.73 HISTORY OF STROKE: ICD-10-CM

## 2022-02-17 DIAGNOSIS — Z79.4 TYPE 2 DIABETES MELLITUS WITH DIABETIC POLYNEUROPATHY, WITH LONG-TERM CURRENT USE OF INSULIN: ICD-10-CM

## 2022-02-17 DIAGNOSIS — E11.42 TYPE 2 DIABETES MELLITUS WITH DIABETIC POLYNEUROPATHY, WITH LONG-TERM CURRENT USE OF INSULIN: Primary | ICD-10-CM

## 2022-02-17 LAB
ANION GAP SERPL CALC-SCNC: 10 MMOL/L (ref 8–16)
BUN SERPL-MCNC: 30 MG/DL (ref 8–23)
CALCIUM SERPL-MCNC: 9.8 MG/DL (ref 8.7–10.5)
CHLORIDE SERPL-SCNC: 107 MMOL/L (ref 95–110)
CO2 SERPL-SCNC: 26 MMOL/L (ref 23–29)
CREAT SERPL-MCNC: 1.8 MG/DL (ref 0.5–1.4)
EST. GFR  (AFRICAN AMERICAN): 32 ML/MIN/1.73 M^2
EST. GFR  (NON AFRICAN AMERICAN): 27.7 ML/MIN/1.73 M^2
GLUCOSE SERPL-MCNC: 141 MG/DL (ref 70–110)
POTASSIUM SERPL-SCNC: 4.2 MMOL/L (ref 3.5–5.1)
SODIUM SERPL-SCNC: 143 MMOL/L (ref 136–145)

## 2022-02-17 PROCEDURE — 1126F PR PAIN SEVERITY QUANTIFIED, NO PAIN PRESENT: ICD-10-PCS | Mod: CPTII,S$GLB,, | Performed by: INTERNAL MEDICINE

## 2022-02-17 PROCEDURE — 99214 PR OFFICE/OUTPT VISIT, EST, LEVL IV, 30-39 MIN: ICD-10-PCS | Mod: S$GLB,,, | Performed by: INTERNAL MEDICINE

## 2022-02-17 PROCEDURE — 3008F PR BODY MASS INDEX (BMI) DOCUMENTED: ICD-10-PCS | Mod: CPTII,S$GLB,, | Performed by: INTERNAL MEDICINE

## 2022-02-17 PROCEDURE — 1160F PR REVIEW ALL MEDS BY PRESCRIBER/CLIN PHARMACIST DOCUMENTED: ICD-10-PCS | Mod: CPTII,S$GLB,, | Performed by: INTERNAL MEDICINE

## 2022-02-17 PROCEDURE — 99999 PR PBB SHADOW E&M-EST. PATIENT-LVL I: ICD-10-PCS | Mod: PBBFAC,,,

## 2022-02-17 PROCEDURE — 1126F AMNT PAIN NOTED NONE PRSNT: CPT | Mod: CPTII,S$GLB,, | Performed by: INTERNAL MEDICINE

## 2022-02-17 PROCEDURE — G0108 PR DIAB MANAGE TRN  PER INDIV: ICD-10-PCS | Mod: S$GLB,,, | Performed by: INTERNAL MEDICINE

## 2022-02-17 PROCEDURE — 1160F RVW MEDS BY RX/DR IN RCRD: CPT | Mod: CPTII,S$GLB,, | Performed by: INTERNAL MEDICINE

## 2022-02-17 PROCEDURE — 99999 PR PBB SHADOW E&M-EST. PATIENT-LVL V: ICD-10-PCS | Mod: PBBFAC,,, | Performed by: INTERNAL MEDICINE

## 2022-02-17 PROCEDURE — 99214 OFFICE O/P EST MOD 30 MIN: CPT | Mod: S$GLB,,, | Performed by: INTERNAL MEDICINE

## 2022-02-17 PROCEDURE — 3066F NEPHROPATHY DOC TX: CPT | Mod: CPTII,S$GLB,, | Performed by: INTERNAL MEDICINE

## 2022-02-17 PROCEDURE — 1101F PR PT FALLS ASSESS DOC 0-1 FALLS W/OUT INJ PAST YR: ICD-10-PCS | Mod: CPTII,S$GLB,, | Performed by: INTERNAL MEDICINE

## 2022-02-17 PROCEDURE — 3060F PR POS MICROALBUMINURIA RESULT DOCUMENTED/REVIEW: ICD-10-PCS | Mod: CPTII,S$GLB,, | Performed by: INTERNAL MEDICINE

## 2022-02-17 PROCEDURE — 3044F PR MOST RECENT HEMOGLOBIN A1C LEVEL <7.0%: ICD-10-PCS | Mod: CPTII,S$GLB,, | Performed by: INTERNAL MEDICINE

## 2022-02-17 PROCEDURE — 3060F POS MICROALBUMINURIA REV: CPT | Mod: CPTII,S$GLB,, | Performed by: INTERNAL MEDICINE

## 2022-02-17 PROCEDURE — 3044F HG A1C LEVEL LT 7.0%: CPT | Mod: CPTII,S$GLB,, | Performed by: INTERNAL MEDICINE

## 2022-02-17 PROCEDURE — 3066F PR DOCUMENTATION OF TREATMENT FOR NEPHROPATHY: ICD-10-PCS | Mod: CPTII,S$GLB,, | Performed by: INTERNAL MEDICINE

## 2022-02-17 PROCEDURE — 3078F PR MOST RECENT DIASTOLIC BLOOD PRESSURE < 80 MM HG: ICD-10-PCS | Mod: CPTII,S$GLB,, | Performed by: INTERNAL MEDICINE

## 2022-02-17 PROCEDURE — 3288F PR FALLS RISK ASSESSMENT DOCUMENTED: ICD-10-PCS | Mod: CPTII,S$GLB,, | Performed by: INTERNAL MEDICINE

## 2022-02-17 PROCEDURE — 4010F PR ACE/ARB THEARPY RXD/TAKEN: ICD-10-PCS | Mod: CPTII,S$GLB,, | Performed by: INTERNAL MEDICINE

## 2022-02-17 PROCEDURE — 99999 PR PBB SHADOW E&M-EST. PATIENT-LVL V: CPT | Mod: PBBFAC,,, | Performed by: INTERNAL MEDICINE

## 2022-02-17 PROCEDURE — 3288F FALL RISK ASSESSMENT DOCD: CPT | Mod: CPTII,S$GLB,, | Performed by: INTERNAL MEDICINE

## 2022-02-17 PROCEDURE — 3075F SYST BP GE 130 - 139MM HG: CPT | Mod: CPTII,S$GLB,, | Performed by: INTERNAL MEDICINE

## 2022-02-17 PROCEDURE — 1101F PT FALLS ASSESS-DOCD LE1/YR: CPT | Mod: CPTII,S$GLB,, | Performed by: INTERNAL MEDICINE

## 2022-02-17 PROCEDURE — 3075F PR MOST RECENT SYSTOLIC BLOOD PRESS GE 130-139MM HG: ICD-10-PCS | Mod: CPTII,S$GLB,, | Performed by: INTERNAL MEDICINE

## 2022-02-17 PROCEDURE — 99999 PR PBB SHADOW E&M-EST. PATIENT-LVL I: CPT | Mod: PBBFAC,,,

## 2022-02-17 PROCEDURE — 1159F MED LIST DOCD IN RCRD: CPT | Mod: CPTII,S$GLB,, | Performed by: INTERNAL MEDICINE

## 2022-02-17 PROCEDURE — G0108 DIAB MANAGE TRN  PER INDIV: HCPCS | Mod: S$GLB,,, | Performed by: INTERNAL MEDICINE

## 2022-02-17 PROCEDURE — 36415 COLL VENOUS BLD VENIPUNCTURE: CPT | Performed by: INTERNAL MEDICINE

## 2022-02-17 PROCEDURE — 80048 BASIC METABOLIC PNL TOTAL CA: CPT | Performed by: INTERNAL MEDICINE

## 2022-02-17 PROCEDURE — 3008F BODY MASS INDEX DOCD: CPT | Mod: CPTII,S$GLB,, | Performed by: INTERNAL MEDICINE

## 2022-02-17 PROCEDURE — 3078F DIAST BP <80 MM HG: CPT | Mod: CPTII,S$GLB,, | Performed by: INTERNAL MEDICINE

## 2022-02-17 PROCEDURE — 4010F ACE/ARB THERAPY RXD/TAKEN: CPT | Mod: CPTII,S$GLB,, | Performed by: INTERNAL MEDICINE

## 2022-02-17 PROCEDURE — 1159F PR MEDICATION LIST DOCUMENTED IN MEDICAL RECORD: ICD-10-PCS | Mod: CPTII,S$GLB,, | Performed by: INTERNAL MEDICINE

## 2022-02-17 NOTE — PROGRESS NOTES
"Subjective:       Patient ID: Annika Mac is a 71 y.o. female.    Chief Complaint: Hypertension  This is a 71-year-old who presents today for follow-up hypertension she has been back on her medications including chlorthalidone and her blood pressure seems to be doing fairly well at home when she checks it in 120s to 140s she is not having the spike that she was having before when she was off of some of her medications and she was doing bariatric wound care.  She has also been working with endocrinology for her diabetes and some of her medications were recently adjusted she reports that her insulin was changed from 70 30 to Tresiba along with short-acting.  Patient reports that her wound is healed she completed her antibiotics for her osteomyelitis and was discharged from [facility] she will plan to make a follow-up with her podiatrist as well for continued surveillance she has no open lesions and has been cautious with her feet as well.    HPI  Review of Systems   Cardiovascular: Negative for chest pain.   Musculoskeletal:        Back and arthritis  Knees using walker  Sees ortho     Foot wound recent osteomyelitis  Resolved        Objective:     Blood pressure 132/70, pulse 83, resp. rate 18, height 5' 5" (1.651 m), weight 133.2 kg (293 lb 10.4 oz), SpO2 99 %.    Physical Exam  Constitutional:       General: She is not in acute distress.  HENT:      Mouth/Throat:      Mouth: Oropharynx is clear and moist.   Cardiovascular:      Rate and Rhythm: Normal rate and regular rhythm.      Heart sounds: Murmur heard.   No friction rub. No gallop.    Pulmonary:      Effort: Pulmonary effort is normal. No respiratory distress.      Breath sounds: Normal breath sounds.   Abdominal:      General: Bowel sounds are normal.      Palpations: Abdomen is soft. There is no mass.      Tenderness: There is no abdominal tenderness.   Musculoskeletal:         General: No edema.      Cervical back: Neck supple.      Comments: Stable " edema    Skin:     Findings: No erythema.   Neurological:      Mental Status: She is alert.   Psychiatric:         Mood and Affect: Mood and affect normal.         Assessment:       1. Type 2 diabetes mellitus with diabetic polyneuropathy, with long-term current use of insulin    2. Hypertension associated with diabetes    3. Diabetic foot ulcer with osteomyelitis    4. Lumbar facet arthropathy    5. History of stroke    6. Osteomyelitis, chronic, ankle or foot, left        Plan:       Annika was seen today for hypertension.    Diagnoses and all orders for this visit:    Type 2 diabetes mellitus with diabetic polyneuropathy, with long-term current use of insulin  Continue endocrinology follow-up she had recent adjustment in her insulin was switched to Tresiba and short-acting with meals patient is also on Ozempic   -     Basic Metabolic Panel; Future  -     Ambulatory referral/consult to Podiatry; Future    Hypertension associated with diabetes  Blood pressure improved continue home surveillance will maintain current regimen repeat labs  -     Basic Metabolic Panel; Future    Diabetic foot ulcer with osteomyelitis  Osteomyelitis left foot   History of she has completed her antibiotics and her wound has healed she will follow-up with podiatry for continued foot surveillance precautions  -     Ambulatory referral/consult to Podiatry; Future    Lumbar facet arthropathy  Osteoarthritis  History of stable she follows with specialists    History of stroke  History of continue fall precautions she uses walker    Follow-up as previously scheduled for recheck bring her home readings at that time

## 2022-02-18 ENCOUNTER — TELEPHONE (OUTPATIENT)
Dept: INTERNAL MEDICINE | Facility: CLINIC | Age: 72
End: 2022-02-18
Payer: MEDICARE

## 2022-02-18 NOTE — TELEPHONE ENCOUNTER
Called and spoke to pt. Went over lab results. Pt verbalized understanding to continue present medication regimen with read back.

## 2022-02-20 DIAGNOSIS — E78.2 MIXED HYPERLIPIDEMIA: ICD-10-CM

## 2022-02-21 NOTE — TELEPHONE ENCOUNTER
No new care gaps identified.  Powered by [employer]. Reference number: [id number].   2/20/2022 8:38:52 PM CST

## 2022-02-22 RX ORDER — SIMVASTATIN 40 MG/1
40 TABLET, FILM COATED ORAL NIGHTLY
Qty: 90 TABLET | Refills: 3 | Status: SHIPPED | OUTPATIENT
Start: 2022-02-22 | End: 2022-11-30

## 2022-02-25 NOTE — PROGRESS NOTES
Diabetes Care Specialist Progress Note  Author: Maria G Lopez RN, CDE  Date: 2/17/2022    Program Intake  Current diabetes risk level:: moderate  Permission to speak with others about care:: yes (Spouse is present at visit)    Lab Results   Component Value Date    HGBA1C 6.6 (H) 01/07/2022      Currently taking Tresiba 30 units in am, Humalog 10 units ac meals and Ozempic.     Clinical    Problem Review  Reviewed Problem List with Patient: yes  Active comorbidities affecting diabetes self-care.: yes  Comorbidities: Hypertension, Chronic Kidney Disease, Other (comment) (Difficulty with ambulation)  Reviewed health maintenance: yes    Clinical Assessment  Have you ever experienced hypoglycemia (low blood sugar)?: yes  In the last month, how often have you experienced low blood sugar?: once a day  What symptoms do you experience?: Dizzy/Light-headed, Shaky  Have you ever been hospitalized because your blood sugar was too low?: yes (see comments) (Was treated at hospital in April for low bg)  Have you ever experienced hyperglycemia (high blood sugar)?: yes  In the last month, how often have you experienced high blood sugar?: more than once a day    Medication Information  How do you obtain your medications?: Family picks up  How many days a week do you miss your medications?: Never  Medication adherence impacting ability to self-manage diabetes?: No       Nutritional Status  Meal Plan 24 Hour Recall: Breakfast, Lunch, Snack  Meal Plan 24 Hour Recall - Breakfast: yesterday; waffles, eggs, turkey phan  Meal Plan 24 Hour Recall - Lunch: cabbage and corn bread  Meal Plan 24 Hour Recall - Snack: juice, peanut butter crackers    Additional Social History    Support  Does anyone support you with your diabetes care?: yes    Access to Mass Media & Technology  Does the patient have access to any of the following devices or technologies?: Smart phone (Not compatible with Dexcom nicholas)      Health Literacy  Preferred Learning  Method: Face to Face      Diabetes Self-Management Skills Assessment    Diabetes Disease Process/Treatment Options  Patient/caregiver able to state what happens when someone has diabetes.: yes  Patient/caregiver knows what type of diabetes they have.: yes  Diabetes Type : Type II  Diabetes Disease Process/Treatment Options: Skills Assessment Completed: Yes  Assessment indicates:: Adequate understanding  Area of need?: No    Nutrition/Healthy Eating  Challenges to healthy eating:: portion control  Method of carbohydrate measurement:: no method  Patient can identify foods that impact blood sugar.: yes  Patient-identified foods:: sweets  Assessment indicates:: Knowledge deficit, Instruction Needed  Area of need?: Yes    Physical Activity/Exercise  Patient's daily activity level:: sedentary  Patient formally exercises outside of work.: no  Reasons for not exercising:: physically unable to exercise currently  Patient can identify forms of physical activity.: no  Physical Activity/Exercise Skills Assessment Completed: : Yes  Assessment indicates:: Knowledge deficit  Area of need?: Yes    Medications  Patient is able to describe current diabetes management routine.: yes  Diabetes management routine:: insulin  Patient is able to identify current diabetes medications, dosages, and appropriate timing of medications.: yes  Patient understands the purpose of the medications taken for diabetes.: yes  Patient reports problems or concerns with current medication regimen.: yes  Medication regimen problems/concerns:: concerned about side effects  Medication Skills Assessment Completed:: Yes  Assessment indicates:: Adequate understanding  Area of need?: No    Home Blood Glucose Monitoring  Patient states that blood sugar is checked at home daily.: yes  Monitoring Method:: home glucometer  Home glucometer meter type:: Insurance preferred meter  How often do you check your blood sugar?: 3 times a day  When do you check your blood  sugar?: Before breakfast, Before lunch, Before dinner  When you check what is your typical blood sugar range? : see attached  Blood glucose logs:: yes, encouraged to keep logs, encouraged to bring logs to provider visits  Blood glucose logs reviewed today?: yes  Home Blood Glucose Monitoring Skills Assessment Completed: : Yes  Assessment indicates:: Adequate understanding  Area of need?: No    Acute Complications  Patient is able to identify types of acute complications: Yes  Patient Identified:: Hypoglycemia  Patient is able to state the basic meaning of hypoglycemia?: Yes  Able to state the blood sugar range for hypoglycemia?: yes  Patient stated range:: less than 80  Patient can identify general symptoms of hypoglycemia: yes  Patient identified:: shakiness  Able to state proper treatment of hypoglycemia?: yes  Patient identified:: 5-6 pieces of hard candy, 1/2 can soda/fruit juice  Acute Complications Skills Assessment Completed: : Yes  Assessment indicates:: Adequate understanding  Area of need?: No    Chronic Complications  Patient can identify major chronic complications of diabetes.: yes  Stated chronic complications:: kidney disease  Patient can identify ways to prevent or delay diabetes complications.: yes  Stated ways to prevent complications:: controlling blood sugar  Chronic Complications Skills Assessment Completed: : Yes  Assessment indicates:: Knowledge deficit  Area of need?: Yes    Psychosocial/Coping  Patient can identify ways of coping with chronic disease.: yes  Patient-stated ways of coping with chronic disease:: support from loved ones  Psychosocial/Coping Skills Assessment Completed: : Yes  Assessment indicates:: Adequate understanding  Area of need?: No      Assessment Summary and Plan    Based on today's diabetes care assessment, the following areas of need were identified:      Social 5/4/2021   Support No   Access to Mass Media/Tech Yes   Cognitive/Behavioral Health No   Health Literacy No         Clinical 2/17/2022   Medication Adherence No        Diabetes Self-Management Skills 2/17/2022   Diabetes Disease Process/Treatment Options No   Nutrition/Healthy Eating Yes, see care planning   Physical Activity/Exercise Yes - discussed benefits of exercise as it relates to insulin resistance and weight loss.  Annika appears to be ambulating much better.  Came to clinic without wheelchair.    Medication No   Home Blood Glucose Monitoring No   Acute Complications No   Chronic Complications Yes - Reviewed blood glucose goals, prevention, detection, signs and symptoms, and treatment of hypoglycemia and hyperglycemia, and when to contact the clinic.   Psychosocial/Coping No          Today's interventions were provided through individual discussion, instruction, and written materials were provided.      Patient verbalized understanding of instruction and written materials.  Pt was able to return back demonstration of instructions today. Patient understood key points, needs reinforcement and further instruction.     Diabetes Self-Management Care Plan:    Today's Diabetes Self-Management Care Plan was developed with Annika's input. Annika has agreed to work toward the following goal(s) to improve his/her overall diabetes control.      Care Plan: Diabetes Management   Updates made since 1/26/2022 12:00 AM      Problem: Medications       Goal: Patient Agrees to take Diabetes Medication(s) as prescribed.    Start Date: 5/4/2021   Expected End Date: 4/18/22   This Visit's Progress: On track   Priority: High   Barriers: No Barriers Identified   Note:    Dexcom download was evaluated.  She is having lows at various times of the days. Most lows are occurring overnight and in the afternoon which corresponds to her reported low she experinced a few weeks back where intervention was required. Denies any missed doses and is eating 3 meals per day.  Per HCP her insulin will be reduced to the following: Novolin 70/30 80 units in am  and 40 units in pm.  She will stop giving the lunch dose.     She records her carb amounts in Dexcom, however she has been entering them incorrectly and reflects a larger amount of carbs than what she is consuming.   She was instructed on how to enter the correct amt into her Dexcom.     Update to care planning 2/17/2022:  Currently taking Tresiba 30 units in am, Humalog 10 units ac meals and Ozempic.  Blood glucose logs reviewed.  Patient doing well with her numbers see attached logs.  Not using the Dexcom.  Current A1c down to 6.6%     Problem: Healthy Eating       Goal: Eat 2-3 meals daily with 30-45g/2-3 servings of Carbohydrate per meal. Limit snacking in between meal to 1 serving (15 grams).    Start Date: 2/17/2022   Priority: Medium   Barriers: Lack of Motivation to Change   Note:    Reviewed meal planning and general nutritional counseling with regards to diabetes management. Meal habits reviewed. We concentrated on portion sizes and carb limits for each meal.  This has been a challenge for Annika in the past.  Stress the importance of making better meal choices and to measure all foods.   Encouraged increased consumption of non starchy veggies to diet.  Overall planning meals and making better choices.  Patient is motivated to make changes.      Task: Reviewed the sources and role of Carbohydrate, Protein, and Fat and how each nutrient impacts blood sugar. Completed 2/17/2022      Task: Provided visual examples using dry measuring cups, food models, and other familiar objects such as computer mouse, deck or cards, tennis ball etc. to help with visualization of portions. Completed 2/17/2022      Task: Explained how to count carbohydrates using the food label and the use of dry measuring cups for accurate carb counting. Completed 2/17/2022      Task: Discussed strategies for choosing healthier menu options when dining out. Completed 2/17/2022      Task: Recommended replacing beverages containing high sugar  content with noncaloric/sugar free options and/or water. Completed 2/17/2022      Task: Review the importance of balancing carbohydrates with each meal using portion control techniques to count servings of carbohydrate and label reading to identify serving size and amount of total carbs per serving. Completed 2/17/2022      Task: Provided Sample plate method and reviewed the use of the plate to estimate amounts of carbohydrate per meal. Completed 2/17/2022          Follow Up Plan     [facility]olliow up on 4/18/2022 review bg logs/labs    Today's care plan and follow up schedule was discussed with patient.  Annika verbalized understanding of the care plan, goals, and agrees to follow up plan.        The patient was encouraged to communicate with his/her health care provider/physician and care team regarding his/her condition(s) and treatment.  I provided the patient with my contact information today and encouraged to contact me via phone or [facility] Patient Portal as needed.     Length of Visit   Total Time: 60 Minutes

## 2022-03-04 ENCOUNTER — OFFICE VISIT (OUTPATIENT)
Dept: CARDIOLOGY | Facility: CLINIC | Age: 72
End: 2022-03-04
Payer: MEDICARE

## 2022-03-04 VITALS
HEIGHT: 65 IN | WEIGHT: 289.44 LBS | HEART RATE: 70 BPM | DIASTOLIC BLOOD PRESSURE: 57 MMHG | SYSTOLIC BLOOD PRESSURE: 122 MMHG | BODY MASS INDEX: 48.22 KG/M2

## 2022-03-04 DIAGNOSIS — R06.09 DOE (DYSPNEA ON EXERTION): Primary | ICD-10-CM

## 2022-03-04 DIAGNOSIS — I10 ESSENTIAL HYPERTENSION: ICD-10-CM

## 2022-03-04 DIAGNOSIS — I13.0 HYPERTENSIVE HEART AND CHRONIC KIDNEY DISEASE WITH HEART FAILURE AND STAGE 1 THROUGH STAGE 4 CHRONIC KIDNEY DISEASE, OR UNSPECIFIED CHRONIC KIDNEY DISEASE: ICD-10-CM

## 2022-03-04 DIAGNOSIS — I12.9 TYPE 2 DM WITH CKD STAGE 4 AND HYPERTENSION: Chronic | ICD-10-CM

## 2022-03-04 DIAGNOSIS — G47.33 OSA (OBSTRUCTIVE SLEEP APNEA): ICD-10-CM

## 2022-03-04 DIAGNOSIS — E11.22 TYPE 2 DM WITH CKD STAGE 4 AND HYPERTENSION: Chronic | ICD-10-CM

## 2022-03-04 DIAGNOSIS — N18.4 TYPE 2 DM WITH CKD STAGE 4 AND HYPERTENSION: Chronic | ICD-10-CM

## 2022-03-04 PROCEDURE — 99214 PR OFFICE/OUTPT VISIT, EST, LEVL IV, 30-39 MIN: ICD-10-PCS | Mod: GC,S$GLB,, | Performed by: INTERNAL MEDICINE

## 2022-03-04 PROCEDURE — 99999 PR PBB SHADOW E&M-EST. PATIENT-LVL IV: ICD-10-PCS | Mod: PBBFAC,GC,, | Performed by: INTERNAL MEDICINE

## 2022-03-04 PROCEDURE — 99999 PR PBB SHADOW E&M-EST. PATIENT-LVL IV: CPT | Mod: PBBFAC,GC,, | Performed by: INTERNAL MEDICINE

## 2022-03-04 PROCEDURE — 99214 OFFICE O/P EST MOD 30 MIN: CPT | Mod: GC,S$GLB,, | Performed by: INTERNAL MEDICINE

## 2022-03-04 NOTE — PROGRESS NOTES
PCP - Mamie Cotton MD  Referring Physician:     Subjective:   Patient ID:  Annika Mac is a 71 y.o. y.o. female with a obesity, CVA in 2003, chronic low back pain, RUFINA on CPAP intermittently, diabetes mellitus, hypertension, hyperlipidemia, and remote history of DVT after left knee replacement who presents to the clinic for further management regarding her hypertension.  Patient is currently on amlodipine 10 mg daily, irbesartan 300 mg daily labetalol 300 mg b.i.d. and chlorthalidone 25 mg daily.  Patient reports her blood pressures to be in the 130s to 140s systolic at home.  Today her blood pressure is 122/57 with a heart rate of 70. She complains of dyspnea on exertion at home with ambulating that improves with rest.  She denies any chest pain, tightness, or pressure at rest or exertion.  She also complains of dizziness upon standing, as well as sitting up after leaning forward however denies any syncope.  She has seen nephrology in the past who recommended a renal ultrasound however this is not been done.  Patient also complains of lower extremity edema bilaterally which he states is chronic.  Her lower extremity edema about a week ago was worsened it is now.  She is compliant with her medications.     History:     Active Ambulatory Problems     Diagnosis Date Noted    Type 2 diabetes mellitus with diabetic polyneuropathy, with long-term current use of insulin 07/27/2012    RUFINA (obstructive sleep apnea) 07/27/2012    History of stroke 08/01/2003    Type 2 DM with CKD stage 4 and hypertension 07/27/2012    Epiretinal membrane 08/22/2012    Gout, chronic 09/24/2012    Lumbar facet arthropathy 07/29/2014    Cataract fragments in eye following cataract surgery 10/05/2014    PCO (posterior capsular opacification) 01/08/2015    Peripheral autonomic neuropathy due to DM 02/16/2015    Diastolic dysfunction 07/31/2015    Chronic back pain 12/11/2015    Lumbar spinal stenosis 12/11/2015     Hypertension associated with diabetes 10/27/2016    Primary osteoarthritis of right knee 11/11/2016    Cough syncope 01/27/2017    GERD (gastroesophageal reflux disease) 01/27/2017    Candidiasis, cutaneous 05/22/2017    Osteoarthritis of spine with radiculopathy, cervical region 08/04/2017    Radicular pain in left arm 08/04/2017    Uncontrolled type 2 diabetes mellitus with both eyes affected by proliferative retinopathy without macular edema, with long-term current use of insulin 10/24/2017    Iron deficiency anemia 02/26/2018    Secondary hyperparathyroidism 03/07/2018    Elbow pain, left 07/11/2018    Stage 3b chronic kidney disease 02/13/2019    Ectatic aorta 02/13/2019    Anemia in chronic kidney disease (CKD) 04/12/2019    Hypoglycemia unawareness associated with type 2 diabetes mellitus 07/01/2019    Unspecified inflammatory spondylopathy, lumbar region 01/22/2020    Cervical myelopathy 01/22/2020    Left renal mass 02/25/2020    Pain in both hands 07/09/2020    Decreased range of motion of left thumb 07/09/2020    Anemia of renal disease 09/21/2020    Asthma     Deep vein thrombosis     Arthritis of carpometacarpal (CMC) joint of left thumb 01/08/2021    Pain of left thumb 02/08/2021    Decreased sensation 02/08/2021    Chronic bilateral low back pain without sciatica 04/08/2021    Difficulty walking 04/08/2021    Muscle weakness of lower extremity 04/08/2021    DDD (degenerative disc disease), lumbar 04/28/2021    Chronic pain 05/06/2021    Dyslipidemia associated with type 2 diabetes mellitus 09/13/2021    Ulcer of left foot, with fat layer exposed     Acute osteomyelitis     Osteomyelitis, chronic, ankle or foot, left     Diabetic foot ulcer with osteomyelitis     Anemia 12/22/2021    Irradiation cystitis with hematuria     Polyneuropathy associated with underlying disease 01/14/2022    Hypertensive heart and chronic kidney disease with heart failure and stage 1  through stage 4 chronic kidney disease, or unspecified chronic kidney disease 01/14/2022    Isolated proteinuria 01/21/2022     Resolved Ambulatory Problems     Diagnosis Date Noted    Deep vein thrombophlebitis of left leg 07/27/2012    Vitreous hemorrhage 08/22/2012    Nuclear sclerosis 08/22/2012    ALLERGIC RHINITIS 09/24/2012    Class 3 severe obesity due to excess calories with serious comorbidity and body mass index (BMI) of 50.0 to 59.9 in adult 11/09/2012    Sinusitis acute 11/21/2012    Post-operative state - Right Eye 01/24/2013    Pseudophakia - Right Eye 01/24/2013    PAD (peripheral artery disease) 11/21/2013    Hyperglycemia 05/26/2015    GEORGE (acute kidney injury) 05/26/2015    Diabetes mellitus with hyperosmolarity without hyperglycemic hyperosmolar nonketotic coma 05/27/2015    Vitamin D insufficiency 05/04/2016    Cough 09/01/2016    Dyslipidemia 10/27/2016    Acute pain of right knee 11/11/2016    Gout due to renal impairment involving toe of right foot 12/28/2016    Anemia of chronic renal failure, stage 4 (severe) 11/02/2017    Gout of elbow 07/11/2018    Obesity, diabetes, and hypertension syndrome 02/13/2019    Diabetic ketoacidosis without coma associated with type 2 diabetes mellitus 05/23/2019    GEORGE (acute kidney injury) 05/23/2019    Class 3 severe obesity in adult 05/24/2019    Pain in both wrists 06/19/2019    Joint stiffness of both wrists 06/19/2019    Muscle weakness 06/19/2019    Lower extremity weakness 07/15/2019    Decreased functional mobility and endurance 07/15/2019    Gait abnormality 07/15/2019    Posture imbalance 07/15/2019    Constipation 10/03/2019    Decreased ROM of left shoulder 06/30/2020    Muscle weakness of left arm 06/30/2020    Type 2 diabetes mellitus with obesity 08/19/2020    Type 2 diabetes mellitus with peripheral artery disease 08/19/2020    Chronic obstructive pulmonary disease, unspecified COPD type 01/14/2022  "    Past Medical History:   Diagnosis Date    Encounter for blood transfusion     Obstructive sleep apnea 7/27/2012    Pressure ulcer of toe of left foot        Social History     Tobacco Use    Smoking status: Never Smoker    Smokeless tobacco: Never Used   Substance Use Topics    Alcohol use: No     Family History   Problem Relation Age of Onset    Diabetes Mother     Hypertension Mother     Heart disease Father     Diabetes Sister     Thyroid disease Brother     Diabetes Brother     Hypertension Brother     No Known Problems Daughter     No Known Problems Son     No Known Problems Daughter     Amblyopia Neg Hx     Blindness Neg Hx     Glaucoma Neg Hx     Macular degeneration Neg Hx     Retinal detachment Neg Hx     Strabismus Neg Hx     Colon cancer Neg Hx     Esophageal cancer Neg Hx        Meds:     Review of patient's allergies indicates:   Allergen Reactions    Clindamycin Hives and Swelling    Iodinated contrast media Rash       Current Outpatient Medications:     albuterol (PROAIR HFA) 90 mcg/actuation inhaler, Inhale 2 puffs into the lungs every 6 (six) hours as needed for Wheezing. Rescue, Disp: 18 g, Rfl: 6    allopurinoL (ZYLOPRIM) 300 MG tablet, Take 300 mg by mouth once daily., Disp: , Rfl:     amLODIPine (NORVASC) 10 MG tablet, Take 1 tablet (10 mg total) by mouth once daily., Disp: 90 tablet, Rfl: 3    ammonium lactate 12 % Crea, Apply twice daily to affected parts both feet as needed., Disp: 140 g, Rfl: 11    aspirin 81 MG Chew, Take 1 tablet (81 mg total) by mouth once daily., Disp: , Rfl: 0    BD ULTRA-FINE KIKA PEN NEEDLE 32 gauge x 5/32" Ndle, To use 4 times daily, Disp: 200 each, Rfl: 20    blood sugar diagnostic Strp, 1 strip 4 times daily. Contour Next., Disp: 200 strip, Rfl: 11    blood-glucose meter kit, 1 each by Other route once daily. Use daily. Insurance proffered one touch  E11.42, Disp: 1 each, Rfl: 0    chlorthalidone (HYGROTEN) 25 MG Tab, Take 1 " tablet (25 mg total) by mouth once daily., Disp: 30 tablet, Rfl: 11    colchicine (COLCRYS) 0.6 mg tablet, TAKE 1 TABLET EVERY OTHER DAY, Disp: 45 tablet, Rfl: 3    DULoxetine (CYMBALTA) 30 MG capsule, TAKE 1 CAPSULE BY MOUTH  ONCE DAILY, Disp: 90 capsule, Rfl: 3    ferrous sulfate (FEOSOL) Tab tablet, Take 1 tablet (1 each total) by mouth once daily., Disp: 90 tablet, Rfl: 6    glucagon (GVOKE HYPOPEN 1-PACK) 0.5 mg/0.1 mL AtIn, Inject 1 Dose into the skin daily as needed (for severe hypoglycemia if you aren't able to treat by ingesting sugar)., Disp: 1 Syringe, Rfl: 3    insulin degludec (TRESIBA FLEXTOUCH U-100) 100 unit/mL (3 mL) insulin pen, Inject 30 Units into the skin once daily., Disp: 9 mL, Rfl: 3    insulin lispro (HUMALOG KWIKPEN INSULIN) 100 unit/mL pen, Inject 10 Units into the skin 3 (three) times daily with meals., Disp: 27 mL, Rfl: 3    irbesartan (AVAPRO) 300 MG tablet, Take 1 tablet (300 mg total) by mouth every evening., Disp: 90 tablet, Rfl: 3    labetaloL (NORMODYNE) 300 MG tablet, Take 1 tablet (300 mg total) by mouth 2 (two) times daily., Disp: 180 tablet, Rfl: 6    lancets 31 gauge Misc, 1 lancet by Misc.(Non-Drug; Combo Route) route 4 (four) times daily. E11.42 . Prescribed one touch, Disp: 200 each, Rfl: 6    multivitamin (THERAGRAN) per tablet, Take 1 tablet by mouth once daily., Disp: , Rfl:     pregabalin (LYRICA) 150 MG capsule, Take 1 capsule (150 mg total) by mouth 2 (two) times daily., Disp: 60 capsule, Rfl: 6    semaglutide (OZEMPIC) 1 mg/dose (4 mg/3 mL), Inject 1 mg into the skin every 7 days., Disp: 3 pen, Rfl: 3    simvastatin (ZOCOR) 40 MG tablet, Take 1 tablet (40 mg total) by mouth every evening., Disp: 90 tablet, Rfl: 3    blood-glucose sensor (DEXCOM G6 SENSOR) Terese, Use as Directed (Patient not taking: No sig reported), Disp: 2 Device, Rfl: 11    blood-glucose transmitter (DEXCOM G6 TRANSMITTER) Terese, Every 3 months. Use as Directed to check blood glucose.  "(Patient not taking: No sig reported), Disp: 1 Device, Rfl: 3    famotidine (PEPCID) 20 MG tablet, Take 1 tablet (20 mg total) by mouth 2 (two) times daily., Disp: 1 tablet, Rfl: 0    sodium bicarbonate 325 MG tablet, Take 2 tablets (650 mg total) by mouth 2 (two) times daily., Disp: 120 tablet, Rfl: 11  No current facility-administered medications for this visit.    Facility-Administered Medications Ordered in Other Visits:     fentaNYL injection 25 mcg, 25 mcg, Intravenous, Q5 Min PRN, Desmond Fontana MD, 50 mcg at 01/08/21 0955    midazolam (VERSED) 1 mg/mL injection 0.5 mg, 0.5 mg, Intravenous, PRN, Desmond Fontana MD, 2 mg at 01/08/21 0955    Review of Systems   Constitutional: Negative for fever.   Eyes: Negative for blurred vision and double vision.   Respiratory: Positive for shortness of breath.    Cardiovascular: Positive for leg swelling. Negative for chest pain, palpitations, orthopnea, claudication and PND.   Gastrointestinal: Negative for abdominal pain, nausea and vomiting.   Neurological: Negative for loss of consciousness and weakness.       Objective:   BP (!) 122/57 (BP Location: Left arm, Patient Position: Sitting, BP Method: Large (Automatic))   Pulse 70   Ht 5' 5" (1.651 m)   Wt 131.3 kg (289 lb 7.4 oz)   BMI 48.17 kg/m²   Physical Exam  Constitutional:       General: She is not in acute distress.     Appearance: She is not diaphoretic.   HENT:      Head: Normocephalic and atraumatic.      Right Ear: External ear normal.      Left Ear: External ear normal.   Neck:      Thyroid: No thyromegaly.      Trachea: No tracheal deviation.   Cardiovascular:      Rate and Rhythm: Normal rate.      Pulses:           Carotid pulses are 1+ on the right side and 2+ on the left side.       Radial pulses are 2+ on the right side and 2+ on the left side.        Popliteal pulses are 2+ on the right side and 2+ on the left side.        Dorsalis pedis pulses are 1+ on the right side " and 0 on the left side.        Posterior tibial pulses are 0 on the right side and 2+ on the left side.      Heart sounds: Normal heart sounds. No murmur heard.    No friction rub. No gallop.   Pulmonary:      Effort: No respiratory distress.      Breath sounds: No wheezing.   Abdominal:      General: There is no distension.      Tenderness: There is no abdominal tenderness. There is no rebound.   Musculoskeletal:         General: No tenderness or deformity. Normal range of motion.   Lymphadenopathy:      Cervical: No cervical adenopathy.   Skin:     Findings: No erythema or rash.   Neurological:      Mental Status: She is alert and oriented to person, place, and time.         Labs:     Lab Results   Component Value Date     02/17/2022    K 4.2 02/17/2022     02/17/2022    CO2 26 02/17/2022    BUN 30 (H) 02/17/2022    CREATININE 1.8 (H) 02/17/2022    ANIONGAP 10 02/17/2022     Lab Results   Component Value Date    HGBA1C 6.6 (H) 01/07/2022     No results found for: BNP, BNPTRIAGEBLO    Lab Results   Component Value Date    WBC 5.79 01/18/2022    HGB 9.1 (L) 01/18/2022    HCT 29.7 (L) 01/18/2022     01/18/2022    GRAN 3.1 01/18/2022    GRAN 54.3 01/18/2022     Lab Results   Component Value Date    CHOL 138 01/07/2022    HDL 38 (L) 01/07/2022    LDLCALC 80.0 01/07/2022    TRIG 100 01/07/2022       Lab Results   Component Value Date     02/17/2022    K 4.2 02/17/2022     02/17/2022    CO2 26 02/17/2022    BUN 30 (H) 02/17/2022    CREATININE 1.8 (H) 02/17/2022    ANIONGAP 10 02/17/2022     Lab Results   Component Value Date    HGBA1C 6.6 (H) 01/07/2022     No results found for: BNP, BNPTRIAGEBLO Lab Results   Component Value Date    WBC 5.79 01/18/2022    HGB 9.1 (L) 01/18/2022    HCT 29.7 (L) 01/18/2022     01/18/2022    GRAN 3.1 01/18/2022    GRAN 54.3 01/18/2022     Lab Results   Component Value Date    CHOL 138 01/07/2022    HDL 38 (L) 01/07/2022    LDLCALC 80.0 01/07/2022     TRIG 100 01/07/2022                Cardiovascular Imaging:     Echo 05/27/2015 :  CONCLUSIONS     1 - Trivial aortic regurgitation.     2 - Normal left ventricular systolic function (EF 60-65%).     3 - Left ventricular diastolic dysfunction.     4 - Normal right ventricular systolic function .     Ankle brachial index 05/04/2017:  The right ankle brachial index was 1.25 which is normal.   The left ankle brachial index was 1.22 which is normal.   Waveform analysis are compatible with the above findings.     Stress test:   None    LHC:  None    Assessment & Plan:     Dyspnea on exertion:  No stress test noted in our EMR.  -will order a stress echo   -continue current medication regimen.  -if patient develops dyspnea at rest or chest pain that is worsening at rest or non relieving, recommend going to the ER    Essential hypertension  -will continue current medication regimen for now in order renal ultrasound to evaluate for renal arterial stenosis    Chronic kidney disease  Creatinine noted to be up trending.  Patient is established with Nephrology.  -renal ultrasound as stated above  -follow-up with Nephrology    Type 2 diabetes mellitus  -follow-up with PCP    Obesity  -diet and exercise recommended.  Will provide patient with educational material for Dash diet.      Signed:  Kimo Fan M.D.  Page # [phone]  Cardiovascular Fellow  [facility]

## 2022-03-14 ENCOUNTER — OFFICE VISIT (OUTPATIENT)
Dept: PODIATRY | Facility: CLINIC | Age: 72
End: 2022-03-14
Payer: MEDICARE

## 2022-03-14 ENCOUNTER — OFFICE VISIT (OUTPATIENT)
Dept: INTERNAL MEDICINE | Facility: CLINIC | Age: 72
End: 2022-03-14
Payer: MEDICARE

## 2022-03-14 ENCOUNTER — HOSPITAL ENCOUNTER (OUTPATIENT)
Dept: CARDIOLOGY | Facility: HOSPITAL | Age: 72
Discharge: HOME | End: 2022-03-14
Attending: INTERNAL MEDICINE
Payer: MEDICARE

## 2022-03-14 VITALS
SYSTOLIC BLOOD PRESSURE: 132 MMHG | OXYGEN SATURATION: 98 % | HEART RATE: 85 BPM | DIASTOLIC BLOOD PRESSURE: 62 MMHG | BODY MASS INDEX: 48.6 KG/M2 | WEIGHT: 291.69 LBS | HEIGHT: 65 IN

## 2022-03-14 VITALS
BODY MASS INDEX: 48.6 KG/M2 | SYSTOLIC BLOOD PRESSURE: 105 MMHG | HEART RATE: 71 BPM | WEIGHT: 291.69 LBS | DIASTOLIC BLOOD PRESSURE: 57 MMHG | HEIGHT: 65 IN

## 2022-03-14 VITALS
SYSTOLIC BLOOD PRESSURE: 134 MMHG | BODY MASS INDEX: 48.48 KG/M2 | DIASTOLIC BLOOD PRESSURE: 48 MMHG | HEART RATE: 68 BPM | WEIGHT: 291 LBS | RESPIRATION RATE: 16 BRPM | HEIGHT: 65 IN

## 2022-03-14 DIAGNOSIS — L91.8 CUTANEOUS SKIN TAGS: ICD-10-CM

## 2022-03-14 DIAGNOSIS — R06.00 DYSPNEA, UNSPECIFIED TYPE: ICD-10-CM

## 2022-03-14 DIAGNOSIS — E78.5 HYPERLIPIDEMIA, UNSPECIFIED HYPERLIPIDEMIA TYPE: ICD-10-CM

## 2022-03-14 DIAGNOSIS — R06.09 DOE (DYSPNEA ON EXERTION): ICD-10-CM

## 2022-03-14 DIAGNOSIS — I10 ESSENTIAL HYPERTENSION: ICD-10-CM

## 2022-03-14 DIAGNOSIS — L60.9 DISEASE OF NAIL: Primary | ICD-10-CM

## 2022-03-14 DIAGNOSIS — E11.42 TYPE 2 DIABETES MELLITUS WITH DIABETIC POLYNEUROPATHY, WITH LONG-TERM CURRENT USE OF INSULIN: ICD-10-CM

## 2022-03-14 DIAGNOSIS — I10 PRIMARY HYPERTENSION: Primary | ICD-10-CM

## 2022-03-14 DIAGNOSIS — Z79.4 TYPE 2 DIABETES MELLITUS WITH DIABETIC POLYNEUROPATHY, WITH LONG-TERM CURRENT USE OF INSULIN: ICD-10-CM

## 2022-03-14 DIAGNOSIS — M1A.00X0 IDIOPATHIC CHRONIC GOUT WITHOUT TOPHUS, UNSPECIFIED SITE: ICD-10-CM

## 2022-03-14 DIAGNOSIS — Z86.73 HISTORY OF STROKE: ICD-10-CM

## 2022-03-14 DIAGNOSIS — M54.9 CHRONIC BACK PAIN, UNSPECIFIED BACK LOCATION, UNSPECIFIED BACK PAIN LATERALITY: ICD-10-CM

## 2022-03-14 DIAGNOSIS — I13.0 HYPERTENSIVE HEART AND CHRONIC KIDNEY DISEASE WITH HEART FAILURE AND STAGE 1 THROUGH STAGE 4 CHRONIC KIDNEY DISEASE, OR UNSPECIFIED CHRONIC KIDNEY DISEASE: ICD-10-CM

## 2022-03-14 DIAGNOSIS — G89.29 CHRONIC BACK PAIN, UNSPECIFIED BACK LOCATION, UNSPECIFIED BACK PAIN LATERALITY: ICD-10-CM

## 2022-03-14 DIAGNOSIS — E11.43 DIABETIC AUTONOMIC NEUROPATHY ASSOCIATED WITH TYPE 2 DIABETES MELLITUS: ICD-10-CM

## 2022-03-14 LAB
ASCENDING AORTA: 3.13 CM
BSA FOR ECHO PROCEDURE: 2.46 M2
CV ECHO LV RWT: 0.44 CM
CV STRESS BASE HR: 68 BPM
DIASTOLIC BLOOD PRESSURE: 48 MMHG
DOP CALC LVOT AREA: 3.1 CM2
DOP CALC LVOT DIAMETER: 2 CM
DOP CALC LVOT PEAK VEL: 1.14 M/S
DOP CALC LVOT STROKE VOLUME: 90.24 CM3
DOP CALCLVOT PEAK VEL VTI: 28.74 CM
E WAVE DECELERATION TIME: 327.73 MSEC
E/A RATIO: 0.75
E/E' RATIO: 16.29 M/S
ECHO LV POSTERIOR WALL: 1.04 CM (ref 0.6–1.1)
EJECTION FRACTION: 60 %
FRACTIONAL SHORTENING: 40 % (ref 28–44)
INTERVENTRICULAR SEPTUM: 0.98 CM (ref 0.6–1.1)
IVRT: 79.92 MSEC
LA MAJOR: 5.4 CM
LA MINOR: 5.53 CM
LA WIDTH: 4.36 CM
LEFT ATRIUM SIZE: 3.39 CM
LEFT ATRIUM VOLUME INDEX MOD: 16.4 ML/M2
LEFT ATRIUM VOLUME INDEX: 29.6 ML/M2
LEFT ATRIUM VOLUME MOD: 38.14 CM3
LEFT ATRIUM VOLUME: 68.65 CM3
LEFT INTERNAL DIMENSION IN SYSTOLE: 2.87 CM (ref 2.1–4)
LEFT VENTRICLE DIASTOLIC VOLUME INDEX: 45.71 ML/M2
LEFT VENTRICLE DIASTOLIC VOLUME: 106.04 ML
LEFT VENTRICLE MASS INDEX: 74 G/M2
LEFT VENTRICLE SYSTOLIC VOLUME INDEX: 13.5 ML/M2
LEFT VENTRICLE SYSTOLIC VOLUME: 31.35 ML
LEFT VENTRICULAR INTERNAL DIMENSION IN DIASTOLE: 4.77 CM (ref 3.5–6)
LEFT VENTRICULAR MASS: 170.76 G
LV LATERAL E/E' RATIO: 14.25 M/S
LV SEPTAL E/E' RATIO: 19 M/S
MV A" WAVE DURATION": 14.84 MSEC
MV PEAK A VEL: 1.51 M/S
MV PEAK E VEL: 1.14 M/S
MV STENOSIS PRESSURE HALF TIME: 95.04 MS
MV VALVE AREA P 1/2 METHOD: 2.31 CM2
OHS CV CPX 1 MINUTE RECOVERY HEART RATE: 115 BPM
OHS CV CPX 85 PERCENT MAX PREDICTED HEART RATE MALE: 121
OHS CV CPX MAX PREDICTED HEART RATE: 143
OHS CV CPX PATIENT IS FEMALE: 1
OHS CV CPX PATIENT IS MALE: 0
OHS CV CPX PEAK DIASTOLIC BLOOD PRESSURE: 62 MMHG
OHS CV CPX PEAK HEAR RATE: 121 BPM
OHS CV CPX PEAK RATE PRESSURE PRODUCT: NORMAL
OHS CV CPX PEAK SYSTOLIC BLOOD PRESSURE: 174 MMHG
OHS CV CPX PERCENT MAX PREDICTED HEART RATE ACHIEVED: 85
OHS CV CPX RATE PRESSURE PRODUCT PRESENTING: 8704
PISA TR MAX VEL: 3.01 M/S
PULM VEIN S/D RATIO: 1.92
PV PEAK D VEL: 0.38 M/S
PV PEAK S VEL: 0.73 M/S
RA MAJOR: 5.65 CM
RA PRESSURE: 3 MMHG
RA WIDTH: 3.53 CM
RIGHT VENTRICULAR END-DIASTOLIC DIMENSION: 3.43 CM
RV TISSUE DOPPLER FREE WALL SYSTOLIC VELOCITY 1 (APICAL 4 CHAMBER VIEW): 11.65 CM/S
SINUS: 2.86 CM
STJ: 3.07 CM
SYSTOLIC BLOOD PRESSURE: 128 MMHG
TDI LATERAL: 0.08 M/S
TDI SEPTAL: 0.06 M/S
TDI: 0.07 M/S
TR MAX PG: 36 MMHG
TRICUSPID ANNULAR PLANE SYSTOLIC EXCURSION: 2.65 CM
TV REST PULMONARY ARTERY PRESSURE: 39 MMHG

## 2022-03-14 PROCEDURE — 11721 PR DEBRIDEMENT OF NAILS, 6 OR MORE: ICD-10-PCS | Mod: Q9,S$GLB,, | Performed by: PODIATRIST

## 2022-03-14 PROCEDURE — 3060F POS MICROALBUMINURIA REV: CPT | Mod: CPTII,S$GLB,, | Performed by: PODIATRIST

## 2022-03-14 PROCEDURE — 99213 PR OFFICE/OUTPT VISIT, EST, LEVL III, 20-29 MIN: ICD-10-PCS | Mod: 25,S$GLB,, | Performed by: PODIATRIST

## 2022-03-14 PROCEDURE — 1125F PR PAIN SEVERITY QUANTIFIED, PAIN PRESENT: ICD-10-PCS | Mod: CPTII,S$GLB,, | Performed by: PODIATRIST

## 2022-03-14 PROCEDURE — 1126F AMNT PAIN NOTED NONE PRSNT: CPT | Mod: CPTII,S$GLB,, | Performed by: INTERNAL MEDICINE

## 2022-03-14 PROCEDURE — 3066F PR DOCUMENTATION OF TREATMENT FOR NEPHROPATHY: ICD-10-PCS | Mod: CPTII,S$GLB,, | Performed by: INTERNAL MEDICINE

## 2022-03-14 PROCEDURE — 3074F PR MOST RECENT SYSTOLIC BLOOD PRESSURE < 130 MM HG: ICD-10-PCS | Mod: CPTII,S$GLB,, | Performed by: PODIATRIST

## 2022-03-14 PROCEDURE — 3060F POS MICROALBUMINURIA REV: CPT | Mod: CPTII,S$GLB,, | Performed by: INTERNAL MEDICINE

## 2022-03-14 PROCEDURE — 3044F PR MOST RECENT HEMOGLOBIN A1C LEVEL <7.0%: ICD-10-PCS | Mod: CPTII,S$GLB,, | Performed by: PODIATRIST

## 2022-03-14 PROCEDURE — 3078F PR MOST RECENT DIASTOLIC BLOOD PRESSURE < 80 MM HG: ICD-10-PCS | Mod: CPTII,S$GLB,, | Performed by: PODIATRIST

## 2022-03-14 PROCEDURE — 99214 PR OFFICE/OUTPT VISIT, EST, LEVL IV, 30-39 MIN: ICD-10-PCS | Mod: S$GLB,,, | Performed by: INTERNAL MEDICINE

## 2022-03-14 PROCEDURE — 4010F ACE/ARB THERAPY RXD/TAKEN: CPT | Mod: CPTII,S$GLB,, | Performed by: INTERNAL MEDICINE

## 2022-03-14 PROCEDURE — 3075F PR MOST RECENT SYSTOLIC BLOOD PRESS GE 130-139MM HG: ICD-10-PCS | Mod: CPTII,S$GLB,, | Performed by: INTERNAL MEDICINE

## 2022-03-14 PROCEDURE — 3288F PR FALLS RISK ASSESSMENT DOCUMENTED: ICD-10-PCS | Mod: CPTII,S$GLB,, | Performed by: INTERNAL MEDICINE

## 2022-03-14 PROCEDURE — 3060F PR POS MICROALBUMINURIA RESULT DOCUMENTED/REVIEW: ICD-10-PCS | Mod: CPTII,S$GLB,, | Performed by: INTERNAL MEDICINE

## 2022-03-14 PROCEDURE — 93351 STRESS TTE COMPLETE: CPT

## 2022-03-14 PROCEDURE — 1159F PR MEDICATION LIST DOCUMENTED IN MEDICAL RECORD: ICD-10-PCS | Mod: CPTII,S$GLB,, | Performed by: INTERNAL MEDICINE

## 2022-03-14 PROCEDURE — 3008F BODY MASS INDEX DOCD: CPT | Mod: CPTII,S$GLB,, | Performed by: INTERNAL MEDICINE

## 2022-03-14 PROCEDURE — 1126F PR PAIN SEVERITY QUANTIFIED, NO PAIN PRESENT: ICD-10-PCS | Mod: CPTII,S$GLB,, | Performed by: INTERNAL MEDICINE

## 2022-03-14 PROCEDURE — 1125F AMNT PAIN NOTED PAIN PRSNT: CPT | Mod: CPTII,S$GLB,, | Performed by: PODIATRIST

## 2022-03-14 PROCEDURE — 99999 PR PBB SHADOW E&M-EST. PATIENT-LVL III: ICD-10-PCS | Mod: PBBFAC,,, | Performed by: PODIATRIST

## 2022-03-14 PROCEDURE — 3074F SYST BP LT 130 MM HG: CPT | Mod: CPTII,S$GLB,, | Performed by: PODIATRIST

## 2022-03-14 PROCEDURE — 3008F BODY MASS INDEX DOCD: CPT | Mod: CPTII,S$GLB,, | Performed by: PODIATRIST

## 2022-03-14 PROCEDURE — 3288F FALL RISK ASSESSMENT DOCD: CPT | Mod: CPTII,S$GLB,, | Performed by: INTERNAL MEDICINE

## 2022-03-14 PROCEDURE — 4010F PR ACE/ARB THEARPY RXD/TAKEN: ICD-10-PCS | Mod: CPTII,S$GLB,, | Performed by: INTERNAL MEDICINE

## 2022-03-14 PROCEDURE — 4010F ACE/ARB THERAPY RXD/TAKEN: CPT | Mod: CPTII,S$GLB,, | Performed by: PODIATRIST

## 2022-03-14 PROCEDURE — 99999 PR PBB SHADOW E&M-EST. PATIENT-LVL V: ICD-10-PCS | Mod: PBBFAC,,, | Performed by: INTERNAL MEDICINE

## 2022-03-14 PROCEDURE — 99999 PR PBB SHADOW E&M-EST. PATIENT-LVL V: CPT | Mod: PBBFAC,,, | Performed by: INTERNAL MEDICINE

## 2022-03-14 PROCEDURE — 3066F PR DOCUMENTATION OF TREATMENT FOR NEPHROPATHY: ICD-10-PCS | Mod: CPTII,S$GLB,, | Performed by: PODIATRIST

## 2022-03-14 PROCEDURE — 1101F PR PT FALLS ASSESS DOC 0-1 FALLS W/OUT INJ PAST YR: ICD-10-PCS | Mod: CPTII,S$GLB,, | Performed by: INTERNAL MEDICINE

## 2022-03-14 PROCEDURE — 1160F RVW MEDS BY RX/DR IN RCRD: CPT | Mod: CPTII,S$GLB,, | Performed by: INTERNAL MEDICINE

## 2022-03-14 PROCEDURE — 3044F HG A1C LEVEL LT 7.0%: CPT | Mod: CPTII,S$GLB,, | Performed by: PODIATRIST

## 2022-03-14 PROCEDURE — 1101F PT FALLS ASSESS-DOCD LE1/YR: CPT | Mod: CPTII,S$GLB,, | Performed by: INTERNAL MEDICINE

## 2022-03-14 PROCEDURE — 3075F SYST BP GE 130 - 139MM HG: CPT | Mod: CPTII,S$GLB,, | Performed by: INTERNAL MEDICINE

## 2022-03-14 PROCEDURE — 3078F DIAST BP <80 MM HG: CPT | Mod: CPTII,S$GLB,, | Performed by: INTERNAL MEDICINE

## 2022-03-14 PROCEDURE — 3078F DIAST BP <80 MM HG: CPT | Mod: CPTII,S$GLB,, | Performed by: PODIATRIST

## 2022-03-14 PROCEDURE — 63600175 PHARM REV CODE 636 W HCPCS: Performed by: INTERNAL MEDICINE

## 2022-03-14 PROCEDURE — 93351 STRESS ECHO (CUPID ONLY): ICD-10-PCS | Mod: 26,,, | Performed by: INTERNAL MEDICINE

## 2022-03-14 PROCEDURE — 11721 DEBRIDE NAIL 6 OR MORE: CPT | Mod: Q9,S$GLB,, | Performed by: PODIATRIST

## 2022-03-14 PROCEDURE — 99214 OFFICE O/P EST MOD 30 MIN: CPT | Mod: S$GLB,,, | Performed by: INTERNAL MEDICINE

## 2022-03-14 PROCEDURE — 3008F PR BODY MASS INDEX (BMI) DOCUMENTED: ICD-10-PCS | Mod: CPTII,S$GLB,, | Performed by: PODIATRIST

## 2022-03-14 PROCEDURE — 93351 STRESS TTE COMPLETE: CPT | Mod: 26,,, | Performed by: INTERNAL MEDICINE

## 2022-03-14 PROCEDURE — 1160F PR REVIEW ALL MEDS BY PRESCRIBER/CLIN PHARMACIST DOCUMENTED: ICD-10-PCS | Mod: CPTII,S$GLB,, | Performed by: INTERNAL MEDICINE

## 2022-03-14 PROCEDURE — 3078F PR MOST RECENT DIASTOLIC BLOOD PRESSURE < 80 MM HG: ICD-10-PCS | Mod: CPTII,S$GLB,, | Performed by: INTERNAL MEDICINE

## 2022-03-14 PROCEDURE — 3044F PR MOST RECENT HEMOGLOBIN A1C LEVEL <7.0%: ICD-10-PCS | Mod: CPTII,S$GLB,, | Performed by: INTERNAL MEDICINE

## 2022-03-14 PROCEDURE — 3066F NEPHROPATHY DOC TX: CPT | Mod: CPTII,S$GLB,, | Performed by: PODIATRIST

## 2022-03-14 PROCEDURE — 99213 OFFICE O/P EST LOW 20 MIN: CPT | Mod: 25,S$GLB,, | Performed by: PODIATRIST

## 2022-03-14 PROCEDURE — 3060F PR POS MICROALBUMINURIA RESULT DOCUMENTED/REVIEW: ICD-10-PCS | Mod: CPTII,S$GLB,, | Performed by: PODIATRIST

## 2022-03-14 PROCEDURE — 3044F HG A1C LEVEL LT 7.0%: CPT | Mod: CPTII,S$GLB,, | Performed by: INTERNAL MEDICINE

## 2022-03-14 PROCEDURE — 3066F NEPHROPATHY DOC TX: CPT | Mod: CPTII,S$GLB,, | Performed by: INTERNAL MEDICINE

## 2022-03-14 PROCEDURE — 99999 PR PBB SHADOW E&M-EST. PATIENT-LVL III: CPT | Mod: PBBFAC,,, | Performed by: PODIATRIST

## 2022-03-14 PROCEDURE — 4010F PR ACE/ARB THEARPY RXD/TAKEN: ICD-10-PCS | Mod: CPTII,S$GLB,, | Performed by: PODIATRIST

## 2022-03-14 PROCEDURE — 3008F PR BODY MASS INDEX (BMI) DOCUMENTED: ICD-10-PCS | Mod: CPTII,S$GLB,, | Performed by: INTERNAL MEDICINE

## 2022-03-14 PROCEDURE — 1159F MED LIST DOCD IN RCRD: CPT | Mod: CPTII,S$GLB,, | Performed by: INTERNAL MEDICINE

## 2022-03-14 RX ORDER — MUPIROCIN 20 MG/G
OINTMENT TOPICAL 2 TIMES DAILY PRN
Qty: 30 G | Refills: 3 | Status: SHIPPED | OUTPATIENT
Start: 2022-03-14 | End: 2022-03-22

## 2022-03-14 RX ORDER — DOBUTAMINE HYDROCHLORIDE 400 MG/100ML
10 INJECTION INTRAVENOUS
Status: COMPLETED | OUTPATIENT
Start: 2022-03-14 | End: 2022-03-14

## 2022-03-14 RX ORDER — ATROPINE SULFATE 0.1 MG/ML
1 INJECTION INTRAVENOUS
Status: COMPLETED | OUTPATIENT
Start: 2022-03-14 | End: 2022-03-14

## 2022-03-14 RX ADMIN — DOBUTAMINE HYDROCHLORIDE 10 MCG/KG/MIN: 400 INJECTION INTRAVENOUS at 04:03

## 2022-03-14 RX ADMIN — ATROPINE SULFATE 1 MG: 0.1 INJECTION PARENTERAL at 04:03

## 2022-03-14 NOTE — PROGRESS NOTES
"Subjective:       Patient ID: Annika Mac is a 71 y.o. female.    Chief Complaint: Follow-up  This is a 71-year-old presents today for follow-up.  Patient reports home blood pressure has been doing well last visit she did go see cardiology she was having some exertional dyspnea and they are undergoing some additional tests she has stress test scheduled later today.  She has been getting around a little better she continues to use her walker her foot wound has resolved and she continues to follow with Podiatry patient reports occasionally she gets some irritation in her umbilicus it gets bothersome she will use some peroxide and keep it clean no active drainage that she is aware of.  She has had some improvement in her diabetes and her medications were adjusted by Endocrine.  She has been tolerating her diuretic although sometimes some frequency associated with it    HPI  Review of Systems   Respiratory: Positive for shortness of breath.    Cardiovascular: Negative for chest pain.   Genitourinary:        Frequency urination at times    Musculoskeletal: Positive for arthralgias.        Foot wound healed       Objective:     Blood pressure 132/62, pulse 85, height 5' 5" (1.651 m), weight 132.3 kg (291 lb 10.7 oz), SpO2 98 %.    Physical Exam  Constitutional:       General: She is not in acute distress.     Comments: Walks with wakler    HENT:      Head: Normocephalic.      Comments: Skin tags neck   Eyes:      General: No scleral icterus.  Cardiovascular:      Rate and Rhythm: Normal rate and regular rhythm.      Heart sounds: Murmur heard.     No friction rub. No gallop.   Pulmonary:      Effort: Pulmonary effort is normal. No respiratory distress.      Breath sounds: Normal breath sounds.   Abdominal:      General: Bowel sounds are normal.      Palpations: Abdomen is soft. There is no mass.      Tenderness: There is no abdominal tenderness.      Comments: Umbilicus no drainage    Musculoskeletal:      Cervical back: " Neck supple.      Comments: Foot wounds healed   Stable edema    Skin:     Findings: No erythema.   Neurological:      Mental Status: She is alert.         Assessment:       1. Primary hypertension    2. Type 2 diabetes mellitus with diabetic polyneuropathy, with long-term current use of insulin    3. Hyperlipidemia, unspecified hyperlipidemia type    4. History of stroke    5. Idiopathic chronic gout without tophus, unspecified site    6. Chronic back pain, unspecified back location, unspecified back pain laterality    7. Essential hypertension    8. Cutaneous skin tags    9. Dyspnea, unspecified type        Plan:       Annika was seen today for follow-up.    Diagnoses and all orders for this visit:    Primary hypertension  Blood pressure acceptable continue her regimen and she has follow-up with Cardiology and planning to undergo stress test later today    Type 2 diabetes mellitus with diabetic polyneuropathy, with long-term current use of insulin  History of she continues to follow with endocrinology has had some adjustment in her medications    Hyperlipidemia, unspecified hyperlipidemia type  Remains on statin    History of stroke  Stable    Idiopathic chronic gout without tophus, unspecified site  History of following with an rheumatology    Chronic back pain, unspecified back location, unspecified back pain laterality  Improved    Essential hypertension    Cutaneous skin tags  She requests referral Dermatology she has some skin tags on her neck  -     Ambulatory referral/consult to Dermatology; Future    Dyspnea, unspecified type  Has appoint with cardiology and stress testing planned    Other orders  -     mupirocin (BACTROBAN) 2 % ointment; Apply topically 2 (two) times daily as needed (wound).  She can use to umbilicus with she gets any irritation to area    Follow up 6 months

## 2022-03-15 ENCOUNTER — TELEPHONE (OUTPATIENT)
Dept: PODIATRY | Facility: CLINIC | Age: 72
End: 2022-03-15
Payer: MEDICARE

## 2022-03-15 NOTE — TELEPHONE ENCOUNTER
----- Message from Sully Werner sent at 3/15/2022  9:53 AM CDT -----  Contact: [phone]  Pt is calling to see if she needs this follow up appt with the dr for a hospital fu if she was seen by another provider.    [phone]

## 2022-03-17 ENCOUNTER — TELEPHONE (OUTPATIENT)
Dept: ENDOCRINOLOGY | Facility: CLINIC | Age: 72
End: 2022-03-17
Payer: MEDICARE

## 2022-03-21 ENCOUNTER — HOSPITAL ENCOUNTER (OUTPATIENT)
Dept: CARDIOLOGY | Facility: HOSPITAL | Age: 72
Discharge: HOME | End: 2022-03-21
Attending: INTERNAL MEDICINE
Payer: MEDICARE

## 2022-03-21 DIAGNOSIS — I10 ESSENTIAL HYPERTENSION: ICD-10-CM

## 2022-03-21 DIAGNOSIS — R06.09 DOE (DYSPNEA ON EXERTION): ICD-10-CM

## 2022-03-21 DIAGNOSIS — I13.0 HYPERTENSIVE HEART AND CHRONIC KIDNEY DISEASE WITH HEART FAILURE AND STAGE 1 THROUGH STAGE 4 CHRONIC KIDNEY DISEASE, OR UNSPECIFIED CHRONIC KIDNEY DISEASE: ICD-10-CM

## 2022-03-21 LAB
ABDOMINAL AORTA MID EDV: 19 CM/S
ABDOMINAL AORTA MID PSV: 178 CM/S
LEFT RENAL DIST DIAS: 17 CM/S
LEFT RENAL DIST SYS: 91 CM/S
LEFT RENAL MID DIAS: 12 CM/S
LEFT RENAL MID RAR: 0.58
LEFT RENAL MID SYS: 104 CM/S
LEFT RENAL ORIGIN DIAS: 19 CM/S
LEFT RENAL ORIGIN SYS: 116 CM/S
LEFT RENAL PROX DIAS: 21 CM/S
LEFT RENAL PROX SYS: 97 CM/S
LEFT RENAL ULTRASOUND ACCELERATION TIME MEASUREMENT 1: 34 MS
LEFT RENAL ULTRASOUND ACCELERATION TIME MEASUREMENT 2: 40 MS
LEFT RENAL ULTRASOUND ACCELERATION TIME MEASUREMENT 3: 37 MS
LEFT RENAL ULTRASOUND ACCELERATION TIME MEASUREMENT AVERAGE: 40 MS
LEFT RENAL ULTRASOUND KIDNEY SIZE MEASUREMENT 1: 10.3 CM
LEFT RENAL ULTRASOUND KIDNEY SIZE MEASUREMENT 2: 10.3 CM
LEFT RENAL ULTRASOUND KIDNEY SIZE MEASUREMENT 3: 10.3 CM
LEFT RENAL ULTRASOUND KIDNEY SIZE MEASUREMENT AVERAGE: 10.3 CM
LEFT RENAL ULTRASOUND RESISTIVE INDEX MEASUREMENT 1: 0.84
LEFT RENAL ULTRASOUND RESISTIVE INDEX MEASUREMENT 2: 0.81
LEFT RENAL ULTRASOUND RESISTIVE INDEX MEASUREMENT 3: 0.78
LEFT RENAL ULTRASOUND RESISTIVE INDEX MEASUREMENT AVERAGE: 0.84
OHS CV LEFT RENAL RAR: 0.65
OHS CV RIGHT RENAL RAR: 0.88
OHS CV US LEFT RENAL HIGHEST EDV: 21
OHS CV US LEFT RENAL HIGHEST PSV: 116
OHS CV US RIGHT RENAL HIGHEST EDV: 24
OHS CV US RIGHT RENAL HIGHEST PSV: 157
RIGHT RENAL DIST DIAS: 13 CM/S
RIGHT RENAL DIST SYS: 133 CM/S
RIGHT RENAL MID DIAS: 13 CM/S
RIGHT RENAL MID RAR: 0.53
RIGHT RENAL MID SYS: 95 CM/S
RIGHT RENAL ORIGIN DIAS: 24 CM/S
RIGHT RENAL ORIGIN SYS: 141 CM/S
RIGHT RENAL PROX DIAS: 23 CM/S
RIGHT RENAL PROX SYS: 157 CM/S
RIGHT RENAL ULTRASOUND ACCELERATION TIME MEASUREMENT 1: 29 MS
RIGHT RENAL ULTRASOUND ACCELERATION TIME MEASUREMENT 2: 26 MS
RIGHT RENAL ULTRASOUND ACCELERATION TIME MEASUREMENT 3: 34 MS
RIGHT RENAL ULTRASOUND ACCELERATION TIME MEASUREMENT AVERAGE: 34 MS
RIGHT RENAL ULTRASOUND KIDNEY SIZE MEASUREMENT 1: 10.3 CM
RIGHT RENAL ULTRASOUND KIDNEY SIZE MEASUREMENT 2: 10.3 CM
RIGHT RENAL ULTRASOUND KIDNEY SIZE MEASUREMENT 3: 0.3 CM
RIGHT RENAL ULTRASOUND KIDNEY SIZE MEASUREMENT AVERAGE: 10.3 CM
RIGHT RENAL ULTRASOUND RESISTIVE INDEX MEASUREMENT 1: 0.78
RIGHT RENAL ULTRASOUND RESISTIVE INDEX MEASUREMENT 2: 0.75
RIGHT RENAL ULTRASOUND RESISTIVE INDEX MEASUREMENT 3: 0.77
RIGHT RENAL ULTRASOUND RESISTIVE INDEX MEASUREMENT AVERAGE: 0.78

## 2022-03-21 PROCEDURE — 93975 VASCULAR STUDY: CPT | Mod: 26,,, | Performed by: INTERNAL MEDICINE

## 2022-03-21 PROCEDURE — 93975 VASCULAR STUDY: CPT

## 2022-03-21 PROCEDURE — 93975 CV US RENAL ARTERY STENOSIS HYPERTENSION COMPLETE (CUPID ONLY): ICD-10-PCS | Mod: 26,,, | Performed by: INTERNAL MEDICINE

## 2022-03-22 ENCOUNTER — TELEPHONE (OUTPATIENT)
Dept: PAIN MEDICINE | Facility: CLINIC | Age: 72
End: 2022-03-22
Payer: MEDICARE

## 2022-03-22 ENCOUNTER — HOSPITAL ENCOUNTER (OUTPATIENT)
Dept: RADIOLOGY | Facility: HOSPITAL | Age: 72
Discharge: HOME | End: 2022-03-22
Attending: INTERNAL MEDICINE
Payer: MEDICARE

## 2022-03-22 ENCOUNTER — TELEPHONE (OUTPATIENT)
Dept: ORTHOPEDICS | Facility: CLINIC | Age: 72
End: 2022-03-22
Payer: MEDICARE

## 2022-03-22 ENCOUNTER — OFFICE VISIT (OUTPATIENT)
Dept: RHEUMATOLOGY | Facility: CLINIC | Age: 72
End: 2022-03-22
Payer: MEDICARE

## 2022-03-22 ENCOUNTER — PATIENT OUTREACH (OUTPATIENT)
Dept: ADMINISTRATIVE | Facility: OTHER | Age: 72
End: 2022-03-22
Payer: MEDICARE

## 2022-03-22 VITALS
SYSTOLIC BLOOD PRESSURE: 113 MMHG | BODY MASS INDEX: 48.82 KG/M2 | WEIGHT: 293 LBS | DIASTOLIC BLOOD PRESSURE: 65 MMHG | HEIGHT: 65 IN | HEART RATE: 71 BPM

## 2022-03-22 DIAGNOSIS — M47.816 OSTEOARTHRITIS OF LUMBAR SPINE, UNSPECIFIED SPINAL OSTEOARTHRITIS COMPLICATION STATUS: ICD-10-CM

## 2022-03-22 DIAGNOSIS — M1A.09X0 IDIOPATHIC CHRONIC GOUT OF MULTIPLE SITES WITHOUT TOPHUS: ICD-10-CM

## 2022-03-22 DIAGNOSIS — M47.816 OSTEOARTHRITIS OF LUMBAR SPINE, UNSPECIFIED SPINAL OSTEOARTHRITIS COMPLICATION STATUS: Primary | ICD-10-CM

## 2022-03-22 PROCEDURE — 4010F PR ACE/ARB THEARPY RXD/TAKEN: ICD-10-PCS | Mod: CPTII,S$GLB,, | Performed by: INTERNAL MEDICINE

## 2022-03-22 PROCEDURE — 1159F MED LIST DOCD IN RCRD: CPT | Mod: CPTII,S$GLB,, | Performed by: INTERNAL MEDICINE

## 2022-03-22 PROCEDURE — 3074F SYST BP LT 130 MM HG: CPT | Mod: CPTII,S$GLB,, | Performed by: INTERNAL MEDICINE

## 2022-03-22 PROCEDURE — 4010F ACE/ARB THERAPY RXD/TAKEN: CPT | Mod: CPTII,S$GLB,, | Performed by: INTERNAL MEDICINE

## 2022-03-22 PROCEDURE — 99214 PR OFFICE/OUTPT VISIT, EST, LEVL IV, 30-39 MIN: ICD-10-PCS | Mod: S$GLB,,, | Performed by: INTERNAL MEDICINE

## 2022-03-22 PROCEDURE — 3060F PR POS MICROALBUMINURIA RESULT DOCUMENTED/REVIEW: ICD-10-PCS | Mod: CPTII,S$GLB,, | Performed by: INTERNAL MEDICINE

## 2022-03-22 PROCEDURE — 3008F PR BODY MASS INDEX (BMI) DOCUMENTED: ICD-10-PCS | Mod: CPTII,S$GLB,, | Performed by: INTERNAL MEDICINE

## 2022-03-22 PROCEDURE — 3078F DIAST BP <80 MM HG: CPT | Mod: CPTII,S$GLB,, | Performed by: INTERNAL MEDICINE

## 2022-03-22 PROCEDURE — 3044F PR MOST RECENT HEMOGLOBIN A1C LEVEL <7.0%: ICD-10-PCS | Mod: CPTII,S$GLB,, | Performed by: INTERNAL MEDICINE

## 2022-03-22 PROCEDURE — 3066F PR DOCUMENTATION OF TREATMENT FOR NEPHROPATHY: ICD-10-PCS | Mod: CPTII,S$GLB,, | Performed by: INTERNAL MEDICINE

## 2022-03-22 PROCEDURE — 99999 PR PBB SHADOW E&M-EST. PATIENT-LVL V: CPT | Mod: PBBFAC,,, | Performed by: INTERNAL MEDICINE

## 2022-03-22 PROCEDURE — 1160F PR REVIEW ALL MEDS BY PRESCRIBER/CLIN PHARMACIST DOCUMENTED: ICD-10-PCS | Mod: CPTII,S$GLB,, | Performed by: INTERNAL MEDICINE

## 2022-03-22 PROCEDURE — 1125F AMNT PAIN NOTED PAIN PRSNT: CPT | Mod: CPTII,S$GLB,, | Performed by: INTERNAL MEDICINE

## 2022-03-22 PROCEDURE — 1125F PR PAIN SEVERITY QUANTIFIED, PAIN PRESENT: ICD-10-PCS | Mod: CPTII,S$GLB,, | Performed by: INTERNAL MEDICINE

## 2022-03-22 PROCEDURE — 3066F NEPHROPATHY DOC TX: CPT | Mod: CPTII,S$GLB,, | Performed by: INTERNAL MEDICINE

## 2022-03-22 PROCEDURE — 3044F HG A1C LEVEL LT 7.0%: CPT | Mod: CPTII,S$GLB,, | Performed by: INTERNAL MEDICINE

## 2022-03-22 PROCEDURE — 3078F PR MOST RECENT DIASTOLIC BLOOD PRESSURE < 80 MM HG: ICD-10-PCS | Mod: CPTII,S$GLB,, | Performed by: INTERNAL MEDICINE

## 2022-03-22 PROCEDURE — 72100 X-RAY EXAM L-S SPINE 2/3 VWS: CPT | Mod: TC

## 2022-03-22 PROCEDURE — 3060F POS MICROALBUMINURIA REV: CPT | Mod: CPTII,S$GLB,, | Performed by: INTERNAL MEDICINE

## 2022-03-22 PROCEDURE — 99214 OFFICE O/P EST MOD 30 MIN: CPT | Mod: S$GLB,,, | Performed by: INTERNAL MEDICINE

## 2022-03-22 PROCEDURE — 3074F PR MOST RECENT SYSTOLIC BLOOD PRESSURE < 130 MM HG: ICD-10-PCS | Mod: CPTII,S$GLB,, | Performed by: INTERNAL MEDICINE

## 2022-03-22 PROCEDURE — 72100 XR LUMBAR SPINE AP AND LATERAL: ICD-10-PCS | Mod: 26,,, | Performed by: RADIOLOGY

## 2022-03-22 PROCEDURE — 99999 PR PBB SHADOW E&M-EST. PATIENT-LVL V: ICD-10-PCS | Mod: PBBFAC,,, | Performed by: INTERNAL MEDICINE

## 2022-03-22 PROCEDURE — 1160F RVW MEDS BY RX/DR IN RCRD: CPT | Mod: CPTII,S$GLB,, | Performed by: INTERNAL MEDICINE

## 2022-03-22 PROCEDURE — 72100 X-RAY EXAM L-S SPINE 2/3 VWS: CPT | Mod: 26,,, | Performed by: RADIOLOGY

## 2022-03-22 PROCEDURE — 1159F PR MEDICATION LIST DOCUMENTED IN MEDICAL RECORD: ICD-10-PCS | Mod: CPTII,S$GLB,, | Performed by: INTERNAL MEDICINE

## 2022-03-22 PROCEDURE — 3008F BODY MASS INDEX DOCD: CPT | Mod: CPTII,S$GLB,, | Performed by: INTERNAL MEDICINE

## 2022-03-22 RX ORDER — ALLOPURINOL 300 MG/1
300 TABLET ORAL DAILY
Qty: 90 TABLET | Refills: 2 | Status: SHIPPED | OUTPATIENT
Start: 2022-03-22 | End: 2022-06-20

## 2022-03-22 ASSESSMENT — ROUTINE ASSESSMENT OF PATIENT INDEX DATA (RAPID3)
PATIENT GLOBAL ASSESSMENT SCORE: 5
PSYCHOLOGICAL DISTRESS SCORE: 3.3
FATIGUE SCORE: 6
TOTAL RAPID3 SCORE: 4.5
PAIN SCORE: 6.5
AM STIFFNESS SCORE: 0, NO
MDHAQ FUNCTION SCORE: 0.6

## 2022-03-22 NOTE — TELEPHONE ENCOUNTER
Called patient to schedule f/u appt with Dr. Ragland. Patient stated she is not home and will call back to schedule appt.  ----- Message from Marino Ragland MD sent at 3/22/2022  1:07 PM CDT -----  Thank you.  Shari, please make her an appointment  ----- Message -----  From: Kasi Aleman MD  Sent: 3/22/2022  11:43 AM CDT  To: MD Nadya Cain  Patient would like to re-establish care in your clinic,last seen a year ago,for right knee OA- thanks

## 2022-03-22 NOTE — PROGRESS NOTES
There are no preventive care reminders to display for this patient.  Updates were requested from care everywhere.  Chart was reviewed for overdue Proactive [facility] Encounters (KIRSTIN) topics (CRS, Breast Cancer Screening, Eye exam)  Health Maintenance has been updated.  LIN[place] immunization registry triggered.  Immunizations were reconciled.  Optometry appt 3/25/22

## 2022-03-22 NOTE — PROGRESS NOTES
Chief Complaint   Patient presents with    Disease Management     Patient with chronic gout for a follow up    History of presenting illness    71 year old black female comes with long standing gout since April 2017    Episode : right big toe and ankle  : redness,swelling and pain : couldn't walk,couldnt have a sheet touch her  She had fluid analysis : gout crystals  They gave her colcrys   Symptoms went away    Last I saw her in July she had left elbow effusion/gouty flare    Vianca xrays with spurring  Knee xrays with OA ON THE RIGHT  Left one replaced  LS spine OA  Hand and wrist xrays     Has had uric acids of > 10  .1  JOSE MIGUEL negative  ANCA negative    On allopurinol 100 mg since April 2017  We increased the dose to 200 mg  She has had uric acids 6.2/6.9 since    10/2018 :  we increased her allopurinol to 300 mg  Her uric acid went up to 7.6    We didn't change the dose of allopurinol since we were worried about the kidney function    1/2/2019 her uric acid is 5.2  4/4/2019 uric acid is 5.3    No more gout attacks    Off colchicine      Labs     GFR 32.2  LFTs nml  H/h 9.8/31.6  nml white count,plts    Hematology says anemia is due to CKD     3/2022    No gout attacks  On allopurinol 300 mg daily  Chronic low back pain and knee pain    2019-LS spine xray  Degenerative changes with grade 1 anterolisthesis of L5 on S1.  Well-circumscribed L3 sclerotic focus, which has slightly increased in size since 2014; however favored to represent a benign process such as an enlarging bone island/enostosis.      Past history : DM,RUFINA,prior stroke,PAD,LS spine OA,knee OA,htn,hld    Family history : no family h/o gout    Social history : smoked in her high school years  Used to drink,not anymore    Review of Systems   Constitutional: Negative for activity change, appetite change, chills, diaphoresis, fatigue, fever and unexpected weight change.   HENT: Negative for congestion, dental problem, drooling, ear discharge, ear  pain, facial swelling, hearing loss, mouth sores, nosebleeds, postnasal drip, rhinorrhea, sinus pressure, sinus pain, sneezing, sore throat, tinnitus, trouble swallowing and voice change.    Eyes: Negative for photophobia, pain, discharge, redness, itching and visual disturbance.   Respiratory: Negative for apnea, cough, choking, chest tightness, shortness of breath, wheezing and stridor.    Cardiovascular: Negative for chest pain, palpitations and leg swelling.   Gastrointestinal: Negative for abdominal distention, abdominal pain, anal bleeding, blood in stool, constipation, diarrhea, nausea, rectal pain and vomiting.   Endocrine: Negative for cold intolerance, heat intolerance, polydipsia, polyphagia and polyuria.   Genitourinary: Negative for decreased urine volume, difficulty urinating, dysuria, enuresis, flank pain, frequency, genital sores, hematuria and urgency.   Musculoskeletal: Positive for arthralgias. Negative for back pain, gait problem, joint swelling, myalgias, neck pain and neck stiffness.   Skin: Negative for color change, pallor, rash and wound.   Allergic/Immunologic: Negative for environmental allergies, food allergies and immunocompromised state.   Neurological: Negative for dizziness, tremors, seizures, syncope, facial asymmetry, speech difficulty, weakness, light-headedness, numbness and headaches.   Hematological: Negative for adenopathy. Does not bruise/bleed easily.   Psychiatric/Behavioral: Negative for agitation, behavioral problems, confusion, decreased concentration, dysphoric mood, hallucinations, self-injury, sleep disturbance and suicidal ideas. The patient is not nervous/anxious and is not hyperactive.      Physical Exam     Right knee tender  Lumbar spine tender    Physical Exam   Constitutional: She is oriented to person, place, and time. No distress.   HENT:   Head: Normocephalic.   Mouth/Throat: Oropharynx is clear and moist.   Eyes: Pupils are equal, round, and reactive to  light. Conjunctivae are normal. Right eye exhibits no discharge. Left eye exhibits no discharge. No scleral icterus.   Neck: No thyromegaly present.   Cardiovascular: Normal rate, regular rhythm and normal heart sounds.   Pulmonary/Chest: Effort normal and breath sounds normal. No stridor.   Abdominal: Soft. Bowel sounds are normal.   Musculoskeletal:         General: Normal range of motion.      Cervical back: Normal range of motion.   Lymphadenopathy:     She has no cervical adenopathy.   Neurological: She is alert and oriented to person, place, and time.   Skin: Skin is warm. No rash noted. She is not diaphoretic.   Psychiatric: Affect and judgment normal.         Assessment     71 year old black female with DM,RUFINA,prior stroke,PAD,LS spine OA,knee OA,htn,hld  She comes in with one episode of big toe and ankle pain and swelling on the right foot a year ago which resolved with colcrys and another episode of acute onset pain and swelling of the left elbow  She has had a fluid analysis once in the past which has revealed gout crystals she mentions  This is not available at [facility]    She has had hyperuricemia and she has been on allopurinol 100 mg since April 2017  Uric acids have trended down from 10 + to 7.2 while on allopurinol    We have increased the dose of allopurinol to 300 mg     We saw her during her left elbow flare,this responded to prednisone    She has no complaints today    We have achieved uric acid of less than 6  5.6 - march 2021    No recent gout flares    The CBC,CMP ARE MONITORED       Chronic low back pain and knee pain-Main complaint today    2019-LS spine xray  Degenerative changes with grade 1 anterolisthesis of L5 on S1.  Well-circumscribed L3 sclerotic focus, which has slightly increased in size since 2014; however favored to represent a benign process such as an enlarging bone island/enostosis.      1. Osteoarthritis of lumbar spine, unspecified spinal osteoarthritis complication status     2. Idiopathic chronic gout of multiple sites without tophus        Reviewed labs/xrays  Reviewed medications    F/u problem    Continue 300 mg allopurinol  Medrol dose packs for flares  Uric acid today    Dietary modifications discussed : gave a hand out of all foods to avoid : avoid organ meat,red meat,seafood,shell fish,anchovies,sardines,alcohol particularly beer,fructose containing soft drinks  Its ok to consume moderate wine,diet drinks,dairy proteins,coffee  Eat cherries  Eat purine rich vegetables    Labs and rtc in 12 months     Right knee OA  Ortho- follows up     LS spine xray  Pain management-injections  Olga Lidia healthy back  Needs PT    rtc in a year    Annika was seen today for disease management.    Diagnoses and all orders for this visit:    Osteoarthritis of lumbar spine, unspecified spinal osteoarthritis complication status  -     X-Ray Lumbar Spine AP And Lateral; Future  -     Ambulatory referral/consult to [facility]; Future    Idiopathic chronic gout of multiple sites without tophus  -     Uric Acid; Future    Other orders  -     allopurinoL (ZYLOPRIM) 300 MG tablet; Take 1 tablet (300 mg total) by mouth once daily.

## 2022-03-22 NOTE — TELEPHONE ENCOUNTER
----- Message from Kasi Aleman MD sent at 3/22/2022  2:38 PM CDT -----  THANKS    ----- Message -----  From: Alireza Meraz Jr., MD  Sent: 3/22/2022   2:37 PM CDT  To: LOUISA Quiñones, Ashley Epperson MA, #    Certainly.  We can complete the sequence and make the back relief last.    : Can you please schedule this patient for a visit with Evangelist?    Evangelist: Can you please  this patient on MBB.  Let's do one MBB, then attempt to proceed with RFA since she responded so well to the facet injection.    TY    Alireza Meraz Jr, MD  Interventional Pain Medicine / Anesthesiology    ----- Message -----  From: Kasi Aleman MD  Sent: 3/22/2022  11:40 AM CDT  To: MD Bandar Conn Jr.    Please can you have your staff call her and give her an appointment- she would like to get another back injection  thanks

## 2022-03-23 ENCOUNTER — TELEPHONE (OUTPATIENT)
Dept: ORTHOPEDICS | Facility: CLINIC | Age: 72
End: 2022-03-23
Payer: MEDICARE

## 2022-03-23 NOTE — TELEPHONE ENCOUNTER
----- Message from Suni Daniels sent at 3/23/2022 11:11 AM CDT -----  Regarding: Call back  Contact: [phone]  Type:  Patient Call Back    Who Called:pt  What this is regarding?:schedule new patient  injection appt for left knee with referral   Would the patient rather a call back or a response via [facility]? Call back  Best Call Back Number:[phone]  Additional Information:     Advised to call back directly if there are further questions, or if these symptoms fail to improve as anticipated or worsen.

## 2022-03-25 ENCOUNTER — OFFICE VISIT (OUTPATIENT)
Dept: OPHTHALMOLOGY | Facility: CLINIC | Age: 72
End: 2022-03-25
Payer: MEDICARE

## 2022-03-25 ENCOUNTER — OFFICE VISIT (OUTPATIENT)
Dept: ORTHOPEDICS | Facility: CLINIC | Age: 72
End: 2022-03-25
Payer: MEDICARE

## 2022-03-25 ENCOUNTER — HOSPITAL ENCOUNTER (OUTPATIENT)
Dept: RADIOLOGY | Facility: HOSPITAL | Age: 72
Discharge: HOME | End: 2022-03-25
Attending: ORTHOPAEDIC SURGERY
Payer: MEDICARE

## 2022-03-25 VITALS — BODY MASS INDEX: 48.63 KG/M2 | HEIGHT: 65 IN | WEIGHT: 291.88 LBS

## 2022-03-25 DIAGNOSIS — H35.373 EPIRETINAL MEMBRANE (ERM) OF BOTH EYES: ICD-10-CM

## 2022-03-25 DIAGNOSIS — M25.561 PAIN IN BOTH KNEES, UNSPECIFIED CHRONICITY: Primary | ICD-10-CM

## 2022-03-25 DIAGNOSIS — M25.561 PAIN IN BOTH KNEES, UNSPECIFIED CHRONICITY: ICD-10-CM

## 2022-03-25 DIAGNOSIS — M25.562 PAIN IN BOTH KNEES, UNSPECIFIED CHRONICITY: Primary | ICD-10-CM

## 2022-03-25 DIAGNOSIS — M17.11 PRIMARY OSTEOARTHRITIS OF RIGHT KNEE: Primary | ICD-10-CM

## 2022-03-25 DIAGNOSIS — E66.01 MORBID OBESITY: ICD-10-CM

## 2022-03-25 DIAGNOSIS — M25.562 PAIN IN BOTH KNEES, UNSPECIFIED CHRONICITY: ICD-10-CM

## 2022-03-25 PROCEDURE — 4010F PR ACE/ARB THEARPY RXD/TAKEN: ICD-10-PCS | Mod: CPTII,S$GLB,, | Performed by: ORTHOPAEDIC SURGERY

## 2022-03-25 PROCEDURE — 3044F PR MOST RECENT HEMOGLOBIN A1C LEVEL <7.0%: ICD-10-PCS | Mod: CPTII,S$GLB,, | Performed by: ORTHOPAEDIC SURGERY

## 2022-03-25 PROCEDURE — 4010F PR ACE/ARB THEARPY RXD/TAKEN: ICD-10-PCS | Mod: CPTII,S$GLB,, | Performed by: OPHTHALMOLOGY

## 2022-03-25 PROCEDURE — 73564 XR KNEE ORTHO BILAT WITH FLEXION: ICD-10-PCS | Mod: 26,50,, | Performed by: RADIOLOGY

## 2022-03-25 PROCEDURE — 3288F FALL RISK ASSESSMENT DOCD: CPT | Mod: CPTII,S$GLB,, | Performed by: ORTHOPAEDIC SURGERY

## 2022-03-25 PROCEDURE — 99999 PR PBB SHADOW E&M-EST. PATIENT-LVL IV: ICD-10-PCS | Mod: PBBFAC,,, | Performed by: OPHTHALMOLOGY

## 2022-03-25 PROCEDURE — 1159F PR MEDICATION LIST DOCUMENTED IN MEDICAL RECORD: ICD-10-PCS | Mod: CPTII,S$GLB,, | Performed by: ORTHOPAEDIC SURGERY

## 2022-03-25 PROCEDURE — 3288F PR FALLS RISK ASSESSMENT DOCUMENTED: ICD-10-PCS | Mod: CPTII,S$GLB,, | Performed by: OPHTHALMOLOGY

## 2022-03-25 PROCEDURE — 1159F MED LIST DOCD IN RCRD: CPT | Mod: CPTII,S$GLB,, | Performed by: ORTHOPAEDIC SURGERY

## 2022-03-25 PROCEDURE — 3066F PR DOCUMENTATION OF TREATMENT FOR NEPHROPATHY: ICD-10-PCS | Mod: CPTII,S$GLB,, | Performed by: OPHTHALMOLOGY

## 2022-03-25 PROCEDURE — 3008F PR BODY MASS INDEX (BMI) DOCUMENTED: ICD-10-PCS | Mod: CPTII,S$GLB,, | Performed by: ORTHOPAEDIC SURGERY

## 2022-03-25 PROCEDURE — 1125F PR PAIN SEVERITY QUANTIFIED, PAIN PRESENT: ICD-10-PCS | Mod: CPTII,S$GLB,, | Performed by: ORTHOPAEDIC SURGERY

## 2022-03-25 PROCEDURE — 3060F PR POS MICROALBUMINURIA RESULT DOCUMENTED/REVIEW: ICD-10-PCS | Mod: CPTII,S$GLB,, | Performed by: OPHTHALMOLOGY

## 2022-03-25 PROCEDURE — 1160F RVW MEDS BY RX/DR IN RCRD: CPT | Mod: CPTII,S$GLB,, | Performed by: ORTHOPAEDIC SURGERY

## 2022-03-25 PROCEDURE — 73564 X-RAY EXAM KNEE 4 OR MORE: CPT | Mod: TC,50

## 2022-03-25 PROCEDURE — 1126F PR PAIN SEVERITY QUANTIFIED, NO PAIN PRESENT: ICD-10-PCS | Mod: CPTII,S$GLB,, | Performed by: OPHTHALMOLOGY

## 2022-03-25 PROCEDURE — 92014 PR EYE EXAM, EST PATIENT,COMPREHESV: ICD-10-PCS | Mod: S$GLB,,, | Performed by: OPHTHALMOLOGY

## 2022-03-25 PROCEDURE — 1159F PR MEDICATION LIST DOCUMENTED IN MEDICAL RECORD: ICD-10-PCS | Mod: CPTII,S$GLB,, | Performed by: OPHTHALMOLOGY

## 2022-03-25 PROCEDURE — 1160F PR REVIEW ALL MEDS BY PRESCRIBER/CLIN PHARMACIST DOCUMENTED: ICD-10-PCS | Mod: CPTII,S$GLB,, | Performed by: ORTHOPAEDIC SURGERY

## 2022-03-25 PROCEDURE — 1101F PT FALLS ASSESS-DOCD LE1/YR: CPT | Mod: CPTII,S$GLB,, | Performed by: OPHTHALMOLOGY

## 2022-03-25 PROCEDURE — 1159F MED LIST DOCD IN RCRD: CPT | Mod: CPTII,S$GLB,, | Performed by: OPHTHALMOLOGY

## 2022-03-25 PROCEDURE — 99213 PR OFFICE/OUTPT VISIT, EST, LEVL III, 20-29 MIN: ICD-10-PCS | Mod: S$GLB,,, | Performed by: ORTHOPAEDIC SURGERY

## 2022-03-25 PROCEDURE — 92134 CPTRZ OPH DX IMG PST SGM RTA: CPT | Mod: S$GLB,,, | Performed by: OPHTHALMOLOGY

## 2022-03-25 PROCEDURE — 3066F PR DOCUMENTATION OF TREATMENT FOR NEPHROPATHY: ICD-10-PCS | Mod: CPTII,S$GLB,, | Performed by: ORTHOPAEDIC SURGERY

## 2022-03-25 PROCEDURE — 99999 PR PBB SHADOW E&M-EST. PATIENT-LVL IV: ICD-10-PCS | Mod: PBBFAC,,, | Performed by: ORTHOPAEDIC SURGERY

## 2022-03-25 PROCEDURE — 3060F POS MICROALBUMINURIA REV: CPT | Mod: CPTII,S$GLB,, | Performed by: ORTHOPAEDIC SURGERY

## 2022-03-25 PROCEDURE — 92134 POSTERIOR SEGMENT OCT RETINA (OCULAR COHERENCE TOMOGRAPHY)-BOTH EYES: ICD-10-PCS | Mod: S$GLB,,, | Performed by: OPHTHALMOLOGY

## 2022-03-25 PROCEDURE — 1160F PR REVIEW ALL MEDS BY PRESCRIBER/CLIN PHARMACIST DOCUMENTED: ICD-10-PCS | Mod: CPTII,S$GLB,, | Performed by: OPHTHALMOLOGY

## 2022-03-25 PROCEDURE — 1101F PT FALLS ASSESS-DOCD LE1/YR: CPT | Mod: CPTII,S$GLB,, | Performed by: ORTHOPAEDIC SURGERY

## 2022-03-25 PROCEDURE — 73564 X-RAY EXAM KNEE 4 OR MORE: CPT | Mod: 26,50,, | Performed by: RADIOLOGY

## 2022-03-25 PROCEDURE — 99213 OFFICE O/P EST LOW 20 MIN: CPT | Mod: S$GLB,,, | Performed by: ORTHOPAEDIC SURGERY

## 2022-03-25 PROCEDURE — 3060F PR POS MICROALBUMINURIA RESULT DOCUMENTED/REVIEW: ICD-10-PCS | Mod: CPTII,S$GLB,, | Performed by: ORTHOPAEDIC SURGERY

## 2022-03-25 PROCEDURE — 1101F PR PT FALLS ASSESS DOC 0-1 FALLS W/OUT INJ PAST YR: ICD-10-PCS | Mod: CPTII,S$GLB,, | Performed by: OPHTHALMOLOGY

## 2022-03-25 PROCEDURE — 3008F BODY MASS INDEX DOCD: CPT | Mod: CPTII,S$GLB,, | Performed by: ORTHOPAEDIC SURGERY

## 2022-03-25 PROCEDURE — 3044F PR MOST RECENT HEMOGLOBIN A1C LEVEL <7.0%: ICD-10-PCS | Mod: CPTII,S$GLB,, | Performed by: OPHTHALMOLOGY

## 2022-03-25 PROCEDURE — 1101F PR PT FALLS ASSESS DOC 0-1 FALLS W/OUT INJ PAST YR: ICD-10-PCS | Mod: CPTII,S$GLB,, | Performed by: ORTHOPAEDIC SURGERY

## 2022-03-25 PROCEDURE — 3288F FALL RISK ASSESSMENT DOCD: CPT | Mod: CPTII,S$GLB,, | Performed by: OPHTHALMOLOGY

## 2022-03-25 PROCEDURE — 92014 COMPRE OPH EXAM EST PT 1/>: CPT | Mod: S$GLB,,, | Performed by: OPHTHALMOLOGY

## 2022-03-25 PROCEDURE — 99999 PR PBB SHADOW E&M-EST. PATIENT-LVL IV: CPT | Mod: PBBFAC,,, | Performed by: ORTHOPAEDIC SURGERY

## 2022-03-25 PROCEDURE — 1125F AMNT PAIN NOTED PAIN PRSNT: CPT | Mod: CPTII,S$GLB,, | Performed by: ORTHOPAEDIC SURGERY

## 2022-03-25 PROCEDURE — 3288F PR FALLS RISK ASSESSMENT DOCUMENTED: ICD-10-PCS | Mod: CPTII,S$GLB,, | Performed by: ORTHOPAEDIC SURGERY

## 2022-03-25 PROCEDURE — 4010F ACE/ARB THERAPY RXD/TAKEN: CPT | Mod: CPTII,S$GLB,, | Performed by: OPHTHALMOLOGY

## 2022-03-25 PROCEDURE — 3066F NEPHROPATHY DOC TX: CPT | Mod: CPTII,S$GLB,, | Performed by: ORTHOPAEDIC SURGERY

## 2022-03-25 PROCEDURE — 3066F NEPHROPATHY DOC TX: CPT | Mod: CPTII,S$GLB,, | Performed by: OPHTHALMOLOGY

## 2022-03-25 PROCEDURE — 3044F HG A1C LEVEL LT 7.0%: CPT | Mod: CPTII,S$GLB,, | Performed by: ORTHOPAEDIC SURGERY

## 2022-03-25 PROCEDURE — 92201 OPSCPY EXTND RTA DRAW UNI/BI: CPT | Mod: S$GLB,,, | Performed by: OPHTHALMOLOGY

## 2022-03-25 PROCEDURE — 1126F AMNT PAIN NOTED NONE PRSNT: CPT | Mod: CPTII,S$GLB,, | Performed by: OPHTHALMOLOGY

## 2022-03-25 PROCEDURE — 3044F HG A1C LEVEL LT 7.0%: CPT | Mod: CPTII,S$GLB,, | Performed by: OPHTHALMOLOGY

## 2022-03-25 PROCEDURE — 3060F POS MICROALBUMINURIA REV: CPT | Mod: CPTII,S$GLB,, | Performed by: OPHTHALMOLOGY

## 2022-03-25 PROCEDURE — 4010F ACE/ARB THERAPY RXD/TAKEN: CPT | Mod: CPTII,S$GLB,, | Performed by: ORTHOPAEDIC SURGERY

## 2022-03-25 PROCEDURE — 92201 PR OPHTHALMOSCOPY, EXT, W/RET DRAW/SCLERAL DEPR, I&R, UNI/BI: ICD-10-PCS | Mod: S$GLB,,, | Performed by: OPHTHALMOLOGY

## 2022-03-25 PROCEDURE — 99999 PR PBB SHADOW E&M-EST. PATIENT-LVL IV: CPT | Mod: PBBFAC,,, | Performed by: OPHTHALMOLOGY

## 2022-03-25 PROCEDURE — 1160F RVW MEDS BY RX/DR IN RCRD: CPT | Mod: CPTII,S$GLB,, | Performed by: OPHTHALMOLOGY

## 2022-03-25 RX ORDER — INSULIN HUMAN 100 [IU]/ML
INJECTION, SUSPENSION SUBCUTANEOUS
COMMUNITY
Start: 2022-02-01 | End: 2022-04-18

## 2022-03-25 NOTE — PROGRESS NOTES
HPI     Patient here today for yearly.    Patient c/o occasional blurry VA ou, no pain, tearing OS and floaters w/o   flashes.    No gtts    Last edited by Juhi Evangelista on 3/25/2022 10:53 AM. 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 Glover       2-PDR OU:  T2 uncontrolled on insulin   -S/P PRP OU   -PRP OS most recently 11/25/13 with new subhyaloid heme then, got Avastin 11/13/13   -Last HbA1c was 8.6 on 2/9/15   -Emphasized the importance of tight glucose control    3-ERM OU:   -Good VA, monitor    4-PCIOL OU:   -2002 OS by Rosendo   -2012 OD by Dr. Glover        F/U 1 year  OCT

## 2022-03-28 ENCOUNTER — TELEPHONE (OUTPATIENT)
Dept: PAIN MEDICINE | Facility: CLINIC | Age: 72
End: 2022-03-28
Payer: MEDICARE

## 2022-03-28 ENCOUNTER — OFFICE VISIT (OUTPATIENT)
Dept: PAIN MEDICINE | Facility: CLINIC | Age: 72
End: 2022-03-28
Payer: MEDICARE

## 2022-03-28 VITALS
HEART RATE: 70 BPM | BODY MASS INDEX: 48.57 KG/M2 | DIASTOLIC BLOOD PRESSURE: 65 MMHG | SYSTOLIC BLOOD PRESSURE: 113 MMHG | WEIGHT: 291.88 LBS

## 2022-03-28 DIAGNOSIS — M17.11 PRIMARY OSTEOARTHRITIS OF RIGHT KNEE: ICD-10-CM

## 2022-03-28 DIAGNOSIS — M54.42 CHRONIC BILATERAL LOW BACK PAIN WITH BILATERAL SCIATICA: ICD-10-CM

## 2022-03-28 DIAGNOSIS — M17.0 PRIMARY OSTEOARTHRITIS OF BOTH KNEES: ICD-10-CM

## 2022-03-28 DIAGNOSIS — M47.816 LUMBAR FACET ARTHROPATHY: Primary | ICD-10-CM

## 2022-03-28 DIAGNOSIS — E66.01 MORBID OBESITY: ICD-10-CM

## 2022-03-28 DIAGNOSIS — M47.819 ARTHROPATHY OF FACET JOINTS AT MULTIPLE LEVELS: ICD-10-CM

## 2022-03-28 DIAGNOSIS — M51.36 DDD (DEGENERATIVE DISC DISEASE), LUMBAR: ICD-10-CM

## 2022-03-28 DIAGNOSIS — G89.29 CHRONIC BILATERAL LOW BACK PAIN WITH BILATERAL SCIATICA: ICD-10-CM

## 2022-03-28 DIAGNOSIS — G89.4 CHRONIC PAIN SYNDROME: ICD-10-CM

## 2022-03-28 DIAGNOSIS — M47.816 LUMBAR SPONDYLOSIS: Primary | ICD-10-CM

## 2022-03-28 DIAGNOSIS — M54.41 CHRONIC BILATERAL LOW BACK PAIN WITH BILATERAL SCIATICA: ICD-10-CM

## 2022-03-28 PROCEDURE — 1125F PR PAIN SEVERITY QUANTIFIED, PAIN PRESENT: ICD-10-PCS | Mod: CPTII,S$GLB,, | Performed by: NURSE PRACTITIONER

## 2022-03-28 PROCEDURE — 1125F AMNT PAIN NOTED PAIN PRSNT: CPT | Mod: CPTII,S$GLB,, | Performed by: NURSE PRACTITIONER

## 2022-03-28 PROCEDURE — 99213 OFFICE O/P EST LOW 20 MIN: CPT | Mod: S$GLB,,, | Performed by: NURSE PRACTITIONER

## 2022-03-28 PROCEDURE — 1159F MED LIST DOCD IN RCRD: CPT | Mod: CPTII,S$GLB,, | Performed by: NURSE PRACTITIONER

## 2022-03-28 PROCEDURE — 3066F PR DOCUMENTATION OF TREATMENT FOR NEPHROPATHY: ICD-10-PCS | Mod: CPTII,S$GLB,, | Performed by: NURSE PRACTITIONER

## 2022-03-28 PROCEDURE — 3066F NEPHROPATHY DOC TX: CPT | Mod: CPTII,S$GLB,, | Performed by: NURSE PRACTITIONER

## 2022-03-28 PROCEDURE — 99999 PR PBB SHADOW E&M-EST. PATIENT-LVL III: CPT | Mod: PBBFAC,,, | Performed by: NURSE PRACTITIONER

## 2022-03-28 PROCEDURE — 3060F PR POS MICROALBUMINURIA RESULT DOCUMENTED/REVIEW: ICD-10-PCS | Mod: CPTII,S$GLB,, | Performed by: NURSE PRACTITIONER

## 2022-03-28 PROCEDURE — 1160F PR REVIEW ALL MEDS BY PRESCRIBER/CLIN PHARMACIST DOCUMENTED: ICD-10-PCS | Mod: CPTII,S$GLB,, | Performed by: NURSE PRACTITIONER

## 2022-03-28 PROCEDURE — 3044F PR MOST RECENT HEMOGLOBIN A1C LEVEL <7.0%: ICD-10-PCS | Mod: CPTII,S$GLB,, | Performed by: NURSE PRACTITIONER

## 2022-03-28 PROCEDURE — 3074F SYST BP LT 130 MM HG: CPT | Mod: CPTII,S$GLB,, | Performed by: NURSE PRACTITIONER

## 2022-03-28 PROCEDURE — 99999 PR PBB SHADOW E&M-EST. PATIENT-LVL III: ICD-10-PCS | Mod: PBBFAC,,, | Performed by: NURSE PRACTITIONER

## 2022-03-28 PROCEDURE — 1160F RVW MEDS BY RX/DR IN RCRD: CPT | Mod: CPTII,S$GLB,, | Performed by: NURSE PRACTITIONER

## 2022-03-28 PROCEDURE — 4010F PR ACE/ARB THEARPY RXD/TAKEN: ICD-10-PCS | Mod: CPTII,S$GLB,, | Performed by: NURSE PRACTITIONER

## 2022-03-28 PROCEDURE — 3078F DIAST BP <80 MM HG: CPT | Mod: CPTII,S$GLB,, | Performed by: NURSE PRACTITIONER

## 2022-03-28 PROCEDURE — 3060F POS MICROALBUMINURIA REV: CPT | Mod: CPTII,S$GLB,, | Performed by: NURSE PRACTITIONER

## 2022-03-28 PROCEDURE — 3044F HG A1C LEVEL LT 7.0%: CPT | Mod: CPTII,S$GLB,, | Performed by: NURSE PRACTITIONER

## 2022-03-28 PROCEDURE — 3078F PR MOST RECENT DIASTOLIC BLOOD PRESSURE < 80 MM HG: ICD-10-PCS | Mod: CPTII,S$GLB,, | Performed by: NURSE PRACTITIONER

## 2022-03-28 PROCEDURE — 4010F ACE/ARB THERAPY RXD/TAKEN: CPT | Mod: CPTII,S$GLB,, | Performed by: NURSE PRACTITIONER

## 2022-03-28 PROCEDURE — 1159F PR MEDICATION LIST DOCUMENTED IN MEDICAL RECORD: ICD-10-PCS | Mod: CPTII,S$GLB,, | Performed by: NURSE PRACTITIONER

## 2022-03-28 PROCEDURE — 3074F PR MOST RECENT SYSTOLIC BLOOD PRESSURE < 130 MM HG: ICD-10-PCS | Mod: CPTII,S$GLB,, | Performed by: NURSE PRACTITIONER

## 2022-03-28 PROCEDURE — 3008F PR BODY MASS INDEX (BMI) DOCUMENTED: ICD-10-PCS | Mod: CPTII,S$GLB,, | Performed by: NURSE PRACTITIONER

## 2022-03-28 PROCEDURE — 99213 PR OFFICE/OUTPT VISIT, EST, LEVL III, 20-29 MIN: ICD-10-PCS | Mod: S$GLB,,, | Performed by: NURSE PRACTITIONER

## 2022-03-28 PROCEDURE — 3008F BODY MASS INDEX DOCD: CPT | Mod: CPTII,S$GLB,, | Performed by: NURSE PRACTITIONER

## 2022-03-28 NOTE — PROGRESS NOTES
"[facility] Established  Patient Evaluation    Referred by: Dr Marino Ragland  Reason for referral: Right Knee    CC:   Chief Complaint   Patient presents with    Low-back Pain    Leg Pain    Knee Pain   8/26/2014   Pain Disability Index (PDI) 34 21         Interval Updates: 3/28/2022 - Ms. Mac is following up for Low-back Pain and Knee Pain (Right )  Pain is currently rate 8/10 with a weekly range 8-9/10.  Currently her low back pain and his worst we will begin addressing this 1st.  She has seen orthopedics/Dr. Linares he wants her to lose 10 lb prior to consenting her for right TKA.  Sparse her low back pain she actually received benefit from a previous bilateral lumbar facet injection 5% we targeted the L4-5 L5-S1 facet joints.  She complains of low back pain that has continued, pain is located across her low back with mild radiation into her legs bilaterally.  She denies any recent incident, denies any profound weakness, denies any bowel bladder dysfunction, denies any saddle anesthesia at this time.     5/21/21 - Ms. Mac returns to clinic for follow up visit reporting stable low back pain.  Patient is s/p Bilateral L4-5 and L5-S1 Lumbar Facet Injection on 5/6/21 with 75% relief for a" few days and then pain returned"  Pain intensity is currently 7/10.      HPI:   Annika Mac is a 70 y.o. female with numerous problems resulting from, or exacerbated by, morbid obesity who presents complaining of chronic low back pain.  She states longstanding low back pain with a minor component of pain radiating into the back of the legs, all way down to the calf, bilaterally.  She reports rapidly increasing back pain with standing or walking for more than 5 min, requiring her to sit and rest.  Pain decreases very quickly when resting.  In 2014, she received a bilateral facet steroid injection in the low back.  At that time, the provider documented 90% relief of pain.    Location: low back  Onset: 2-3 months " "ago  Current Pain Score: 7/10  Daily Pain of Range: 5-8/10  Quality: Aching, Sharp and Shooting  Radiation: Buttocks  Worsened by: daily activity  Improved by: nothing    Previous Therapies:  PT/OT: Yes, and currently restarting PT  HEP: no  Interventions:   Bilateral facet injection L4-5 and L5-S1 in 2014 with 90% relief  Surgery: Denies back surgery  Medications:   - NSAIDS:   - MSK Relaxants:   - TCAs:   - SNRIs:   - Topicals:   - Anticonvulsants:  - Opioids:     Current Pain Medications:  1. Lyrica 150 mg b.i.d.  2. Cymbalta 30 mg daily    Review of Systems:  Review of Systems   Constitutional: Negative for chills and fever.   HENT: Negative for nosebleeds.    Eyes: Negative for blurred vision.   Respiratory: Negative for hemoptysis.    Cardiovascular: Negative for chest pain and palpitations.   Gastrointestinal: Negative for heartburn and vomiting.   Genitourinary: Negative for hematuria.   Musculoskeletal: Positive for back pain and joint pain. Negative for falls and myalgias.   Skin: Negative for rash.   Neurological: Negative for tingling, focal weakness, seizures and loss of consciousness.   Endo/Heme/Allergies: Does not bruise/bleed easily.   Psychiatric/Behavioral: Negative for hallucinations.       History:    Current Outpatient Medications:     albuterol (PROAIR HFA) 90 mcg/actuation inhaler, Inhale 2 puffs into the lungs every 6 (six) hours as needed for Wheezing. Rescue, Disp: 18 g, Rfl: 6    allopurinoL (ZYLOPRIM) 300 MG tablet, Take 1 tablet (300 mg total) by mouth once daily., Disp: 90 tablet, Rfl: 4    amLODIPine (NORVASC) 10 MG tablet, Take 1 tablet (10 mg total) by mouth once daily., Disp: 90 tablet, Rfl: 3    ammonium lactate 12 % Crea, Apply twice daily to affected parts both feet as needed., Disp: 140 g, Rfl: 11    aspirin 81 MG Chew, Take 1 tablet (81 mg total) by mouth once daily., Disp: , Rfl: 0    BD ULTRA-FINE KIKA PEN NEEDLE 32 gauge x 5/32" Ndle, To use 4 times daily, Disp: 200 " each, Rfl: 20    betamethasone dipropionate (DIPROLENE) 0.05 % cream, Apply topically 2 (two) times daily. Prn hand rash, Disp: 45 g, Rfl: 0    blood sugar diagnostic Strp, 1 each by Misc.(Non-Drug; Combo Route) route 3 (three) times daily. Dx code  E11.42 . Contour Next, Disp: 200 each, Rfl: 12    blood-glucose meter kit, 1 each by Other route once daily. Use daily. Insurance proffered one touch  E11.42 (Patient taking differently: 1 each by Other route once daily. Contour next .  Insurance proffered one touch  E11.42), Disp: 1 each, Rfl: 0    blood-glucose sensor (DEXCOM G6 SENSOR) Terese, Use as Directed, Disp: 2 Device, Rfl: 11    blood-glucose transmitter (DEXCOM G6 TRANSMITTER) Terese, Every 3 months. Use as Directed to check blood glucose., Disp: 1 Device, Rfl: 3    chlorthalidone (HYGROTEN) 25 MG Tab, Take 1 tablet (25 mg total) by mouth once daily., Disp: 30 tablet, Rfl: 11    colchicine (COLCRYS) 0.6 mg tablet, TAKE 1 TABLET EVERY OTHER DAY, Disp: 45 tablet, Rfl: 3    diclofenac sodium (VOLTAREN) 1 % Gel, Apply 2 g topically 4 (four) times daily., Disp: 1 Tube, Rfl: 2    DULoxetine (CYMBALTA) 30 MG capsule, Take 1 capsule (30 mg total) by mouth once daily., Disp: 90 capsule, Rfl: 3    famotidine (PEPCID) 20 MG tablet, Take 1 tablet (20 mg total) by mouth 2 (two) times daily., Disp: 1 tablet, Rfl: 0    fexofenadine (ALLEGRA) 180 MG tablet, TAKE 1 TABLET BY MOUTH ONCE DAILY, Disp: 30 tablet, Rfl: 0    glucagon (GVOKE HYPOPEN 1-PACK) 0.5 mg/0.1 mL AtIn, Inject 1 Dose into the skin daily as needed (for severe hypoglycemia if you aren't able to treat by ingesting sugar). (Patient not taking: Reported on 5/17/2021), Disp: 1 Syringe, Rfl: 3    HYDROcodone-acetaminophen (NORCO) 7.5-325 mg per tablet, Take 1 tablet by mouth every 6 (six) hours as needed. (Patient not taking: Reported on 4/7/2021), Disp: 28 tablet, Rfl: 0    insulin NPH-insulin regular, 70/30, (NOVOLIN 70/30 U-100 INSULIN) 100 unit/mL  (70-30) injection, 120 UNITS INTO THE SKIN WITH BREAKFAST, 110 UNITS WITH LUNCH, AND INJECT 90 UNITS WITH DINNER ; . (Patient taking differently: 110 UNITS INTO THE SKIN WITH BREAKFAST, 85 UNITS WITH LUNCH, AND INJECT 65 UNITS WITH DINNER ; .), Disp: 30 mL, Rfl: 3    irbesartan (AVAPRO) 300 MG tablet, Take 1 tablet (300 mg total) by mouth every evening., Disp: 90 tablet, Rfl: 3    ketoconazole (NIZORAL) 2 % cream, Apply topically once daily., Disp: 1 Tube, Rfl: 2    lancets 31 gauge Misc, 1 lancet by Misc.(Non-Drug; Combo Route) route 4 (four) times daily. E11.42 . Prescribed one touch, Disp: 200 each, Rfl: 6    LIDOcaine HCL 2% (XYLOCAINE) 2 % jelly, Apply topically as needed. Apply topically once nightly to affected part of foot/feet. (Patient not taking: Reported on 5/17/2021), Disp: 30 mL, Rfl: 2    methylPREDNISolone (MEDROL DOSEPACK) 4 mg tablet, use as directed (Patient not taking: Reported on 5/17/2021), Disp: 1 Package, Rfl: 0    multivitamin (THERAGRAN) per tablet, Take 1 tablet by mouth once daily., Disp: , Rfl:     pregabalin (LYRICA) 150 MG capsule, Take 1 capsule (150 mg total) by mouth 2 (two) times daily., Disp: 180 capsule, Rfl: 3    semaglutide (OZEMPIC) 1 mg/dose (2 mg/1.5 mL) PnIj, Inject 0.75 mLs into the skin every 7 days. (1 mg every week), Disp: 9 mL, Rfl: 4    simvastatin (ZOCOR) 40 MG tablet, Take 1 tablet (40 mg total) by mouth every evening., Disp: 90 tablet, Rfl: 3    sodium bicarbonate 325 MG tablet, Take 2 tablets (650 mg total) by mouth 2 (two) times daily., Disp: 120 tablet, Rfl: 11    topiramate (TOPAMAX) 50 MG tablet, Take 1 tablet (50 mg total) by mouth 2 (two) times daily., Disp: 180 tablet, Rfl: 0    traMADol (ULTRAM) 50 mg tablet, Take 1 tablet (50 mg total) by mouth every 8 (eight) hours as needed., Disp: 90 tablet, Rfl: 3  No current facility-administered medications for this visit.    Facility-Administered Medications Ordered in Other Visits:      fentaNYL injection 25 mcg, 25 mcg, Intravenous, Q5 Min PRN, Desmond Fontana MD, 50 mcg at 21 0955    midazolam (VERSED) 1 mg/mL injection 0.5 mg, 0.5 mg, Intravenous, PRN, Desmond Fontana MD, 2 mg at 21 0955    Past Medical History:   Diagnosis Date    Asthma     Constipation 10/3/2019    Deep vein thrombophlebitis of left leg 2012    Deep vein thrombosis     when she had a knee replacement    Encounter for blood transfusion     anemic     GERD (gastroesophageal reflux disease)     Obesity, diabetes, and hypertension syndrome 2019    Obstructive sleep apnea 2012    PAD (peripheral artery disease) 2013    Type 2 diabetes mellitus with obesity 2020    Type 2 diabetes mellitus with peripheral artery disease 2020       Past Surgical History:   Procedure Laterality Date    CATARACT EXTRACTION W/  INTRAOCULAR LENS IMPLANT Left 3/2002    left     CATARACT EXTRACTION W/  INTRAOCULAR LENS IMPLANT Right     OD dr. olivares     SECTION      COLONOSCOPY N/A 2018    Procedure: COLONOSCOPY;  Surgeon: Faizan Mac MD;  [facility] ([facility]);  Service: Endoscopy;  Laterality: N/A;    CYST REMOVAL  2011    left side of face    CYSTOSCOPY W/ RETROGRADES Left 2020    Procedure: CYSTOSCOPY, WITH RETROGRADE PYELOGRAM;  Surgeon: Noman Rivas MD;  Location: [address];  Service: Urology;  Laterality: Left;  30min    DE QUERVAIN'S RELEASE  2021    Procedure: RELEASE, HAND, FOR DEQUERVAIN'S TENOSYNOVITIS;  Surgeon: Marky Hagen MD;  Location: [facility] OR;  Service: Orthopedics;;    EYE SURGERY Bilateral     eye implants    GANGLION CYST EXCISION  2007    Right wrist    INJECTION OF FACET JOINT Bilateral 2021    Procedure: INJECTION, FACET JOINT--Bilateral L4-5, L5-S1;  Surgeon: Alireza Meraz Jr., MD;  Location: [facility];  Service: Pain Management;  Laterality: Bilateral;   Oral    INTERPOSITION ARTHROPLASTY OF CARPOMETACARPAL JOINTS Left 1/8/2021    Procedure: INTERPOSITION ARTHROPLASTY, CMC JOINT - left dequervains and cmc arthroplasty, arthrex;  Surgeon: Marky Hagen MD;  Location: [facility];  Service: Orthopedics;  Laterality: Left;    JOINT REPLACEMENT Left     Pan Retinal Photocoagulation Bilateral     Dr. Monterroso (Proliferative Diabetic Retinopathy)    TOTAL ABDOMINAL HYSTERECTOMY  1982    TOTAL KNEE ARTHROPLASTY  2/2006    left    TRIGGER FINGER RELEASE  9/18/2009       Family History   Problem Relation Age of Onset    Diabetes Mother     Hypertension Mother     Heart disease Father     Diabetes Sister     Thyroid disease Brother     Diabetes Brother     Hypertension Brother     No Known Problems Daughter     No Known Problems Son     No Known Problems Daughter     Amblyopia Neg Hx     Blindness Neg Hx     Glaucoma Neg Hx     Macular degeneration Neg Hx     Retinal detachment Neg Hx     Strabismus Neg Hx     Colon cancer Neg Hx     Esophageal cancer Neg Hx        Social History     Socioeconomic History    Marital status:      Spouse name: Heber    Number of children: 3    Years of education: Not on file    Highest education level: Not on file   Occupational History    Occupation:    Tobacco Use    Smoking status: Never Smoker    Smokeless tobacco: Never Used   Substance and Sexual Activity    Alcohol use: No    Drug use: No    Sexual activity: Yes     Partners: Male   Other Topics Concern    Not on file   Social History Narrative    , lives with family; 3 children, 4 grandchildren     Social Determinants of Health     Financial Resource Strain:     Difficulty of Paying Living Expenses:    Food Insecurity:     Worried About Running Out of Food in the Last Year:     Ran Out of Food in the Last Year:    Transportation Needs:     Lack of Transportation (Medical):     Lack of Transportation (Non-Medical):     Physical Activity:     Days of Exercise per Week:     Minutes of Exercise per Session:    Stress: No Stress Concern Present    Feeling of Stress : Not at all   Social Connections:     Frequency of Communication with Friends and Family:     Frequency of Social Gatherings with Friends and Family:     Attends Rastafarian Services:     Active Member of Clubs or Organizations:     Attends Club or Organization Meetings:     Marital Status:        Review of patient's allergies indicates:   Allergen Reactions    Iodinated contrast media Rash       Physical Exam:  There were no vitals filed for this visit.  General    Nursing note and vitals reviewed.  Constitutional: She is oriented to person, place, and time. She appears well-developed and well-nourished. No distress.   HENT:   Head: Normocephalic and atraumatic.   Nose: Nose normal.   Eyes: Conjunctivae and EOM are normal. Pupils are equal, round, and reactive to light. Right eye exhibits no discharge. Left eye exhibits no discharge. No scleral icterus.   Neck: No JVD present.   Cardiovascular: Intact distal pulses.    Pulmonary/Chest: Effort normal. No respiratory distress.   Abdominal: She exhibits no distension.   Neurological: She is alert and oriented to person, place, and time. Coordination normal.   Psychiatric: She has a normal mood and affect. Her behavior is normal. Judgment and thought content normal.     General Musculoskeletal Exam   Gait: normal       Right Knee Exam     Inspection   Swelling: present  Deformity: absent    Tenderness   The patient is tender to palpation of the medial joint line and lateral joint line.    Range of Motion   Extension: normal Right knee extension grade: painful.   Flexion: normal Right knee flexion: painful.     Other   Sensation: normal    Left Knee Exam     Inspection   Swelling: present  Deformity: absent    Tenderness   The patient tender to palpation of the medial joint line and lateral joint line.    Range of  Motion   Extension: normal Left knee extension grade: painful.   Flexion: normal Left knee flexion: painful.     Other   Sensation: normalBack (L-Spine & T-Spine) / Neck (C-Spine) Exam     Tenderness Right paramedian tenderness of the Lower L-Spine. Left paramedian tenderness of the Lower L-Spine.     Back (L-Spine & T-Spine) Range of Motion   Back extension: facet loading is positive and exacerabtes/reproduces the patient's typical low back pain    Back flexion: limited ROM but partial relief of low back pain noted.     Spinal Sensation   Right Side Sensation  L-Spine Level: normal  Left Side Sensation  L-Spine Level: normal    Other She has no scoliosis .      Muscle Strength   Right Lower Extremity   Hip Flexion: 5/5   Hip Extensors: 5/5  Quadriceps:  5/5   Hamstrin/5   Gastrocsoleus:  5/5   Left Lower Extremity   Hip Flexion: 5/5   Hip Extensors: 5/5  Quadriceps:  5/5   Hamstrin/5   Gastrocsoleus:  5/5     Reflexes     Left Side  Achilles:  2+  Quadriceps:  2+    Right Side   Achilles:  2+  Quadriceps:  2+      Imagin2021  X-ray Knee Ortho Right  CLINICAL HISTORY:  R KNEE; Pain in right knee  TECHNIQUE:  AP standing of both knees, Merchant views of both knees as well as a lateral view of the right knee were performed.  COMPARISON:  2018  FINDINGS:  No acute fracture seen.  No significant suprapatellar joint effusion.  Tricompartmental osteophyte formation with advanced narrowing of the medial and patellofemoral compartments.  Previous left knee arthroplasty with metallic components in good position.  Impression:  Osteoarthritis with advanced joint space narrowing.    Labs:  BMP  Lab Results   Component Value Date     2021    K 4.0 2021     2021    CO2 26 2021    BUN 30 (H) 2021    CREATININE 1.8 (H) 2021    CALCIUM 9.0 2021    ANIONGAP 8 2021    ESTGFRAFRICA 32.2 (A) 2021    EGFRNONAA 27.9 (A) 2021     Lab Results    Component Value Date    ALT 18 03/22/2021    AST 24 03/22/2021    ALKPHOS 122 03/22/2021    BILITOT 0.3 03/22/2021     Wt Readings from Last 5 Encounters:   05/17/21 (!) 142.9 kg (315 lb 0.6 oz)   05/06/21 95.3 kg (210 lb)   04/15/21 (!) 145.6 kg (320 lb 15.8 oz)   04/07/21 (!) 145.6 kg (321 lb)   03/30/21 (!) 146.5 kg (323 lb)       Assessment:  Problem List Items Addressed This Visit        Neuro    DDD (degenerative disc disease), lumbar    Chronic pain       Orthopedic    Chronic back pain      Other Visit Diagnoses     Lumbar spondylosis    -  Primary    Arthropathy of facet joints at multiple levels        Primary osteoarthritis of both knees              4/28/21 - Annika Mac is a 70 y.o. female with diffuse chronic pain related to degenerative joint disease and degenerative spine disease complicated by morbid obesity and likely sedentary lifestyle as well.  Patient is currently receiving treatment for weight reduction by the Bariatric Medicine Department and follows up with Rheumatology, along with a multitude of other providers.  She received a bilateral lumbar facet injection for low back pain in 2014 which provided her 90% relief based on postoperative assessment.  I recommended repeating this injection.  While patient was referred for knee pain, she chose to focus on low back pain during today's encounter; however, we can treat her knee pain in the future if that is something she wants to discuss.  When she was last in this clinic in 2014 she received recommendations to lose weight and establish a daily home exercise regimen, both of which remain relevant today.    5/21/2021- Annika Mac is a 70 y.o. female who  has a past medical history of Asthma, Constipation (10/3/2019), Deep vein thrombophlebitis of left leg (7/27/2012), Deep vein thrombosis, Encounter for blood transfusion, GERD (gastroesophageal reflux disease), Obesity, diabetes, and hypertension syndrome (2/13/2019), Obstructive sleep apnea  (7/27/2012), PAD (peripheral artery disease) (11/21/2013), Type 2 diabetes mellitus with obesity (8/19/2020), and Type 2 diabetes mellitus with peripheral artery disease (8/19/2020).  By history and examination this patient has chronic low back pain without  radiculopathy.  The underlying cause cause is facet arthritis, degenerative disc disease, muscle dysfunction and deconditioning.  Pathology is confirmed by imaging.  We discussed the underlying diagnoses and multiple treatment options including non-opioid medications, injections, physical therapy, home exercise, activity modification / rest and weight loss.  The risks and benefits of each treatment option were discussed and all questions were answered.  I discussed with patient I will consult Dr. Meraz to see if he would like to move forward with scheduling the bilateral lumbar RFA, patient verbalized understanding and agreed.  Until that I recommended that she continue with physical therapy as well as stabbing home exercise plan that involves daily stretching and core strengthening.    03/28/[id number]-old morbidly obese female with a history of chronic low back pain related to facet arthropathy, DDD complicated by morbid obesity and a sedentary lifestyle.  She has benefited from facet steroid injections targeting L4-5 and L5-S1, patient today on recommending a diagnostic bilateral lumbar MBB targeting L4-5 L5-S1 and considering her for a lumbar RFA following.  The patient was amenable to this plan also provide her with information explain the procedure even further.   Lastly, we did discuss considering her for a right knee GN block and RFA to help with chronic right knee pian, she is being considered for a right TKA with Dr. Ragland once she has los approximately 10 lbs.       : Reviewed and consistent with medication use as prescribed.    Treatment Plan:   Procedures:  Scheduled for diagnostic bilateral lumbar MBB targeting L4-5 L5-S1 x1 and RFA to  follow.                Consider a right knee GNB and RFA in the future.   PT/OT/HEP:    - attend PT as scheduled   - daily cardio   - daily stretching   - focused HEP  Medications: No changes recommended at this time.  Labs: reviewed and medications are appropriately dosed for current hepatorenal function.  Imaging: No additional recommended at this time.    Follow Up:  p Diagnostic Lumbar ELA Hamilton, NP-C  Interventional Pain Management    Disclaimer: This note was partly generated using dictation software which may occasionally result in transcription errors.

## 2022-03-28 NOTE — H&P (VIEW-ONLY)
"[facility] Established  Patient Evaluation    Referred by: Dr Marino Ragland  Reason for referral: Right Knee    CC:   Chief Complaint   Patient presents with    Low-back Pain    Leg Pain    Knee Pain   8/26/2014   Pain Disability Index (PDI) 34 21         Interval Updates: 3/28/2022 - Ms. Mac is following up for Low-back Pain and Knee Pain (Right )  Pain is currently rate 8/10 with a weekly range 8-9/10.  Currently her low back pain and his worst we will begin addressing this 1st.  She has seen orthopedics/Dr. Linares he wants her to lose 10 lb prior to consenting her for right TKA.  Sparse her low back pain she actually received benefit from a previous bilateral lumbar facet injection 5% we targeted the L4-5 L5-S1 facet joints.  She complains of low back pain that has continued, pain is located across her low back with mild radiation into her legs bilaterally.  She denies any recent incident, denies any profound weakness, denies any bowel bladder dysfunction, denies any saddle anesthesia at this time.     5/21/21 - Ms. Mac returns to clinic for follow up visit reporting stable low back pain.  Patient is s/p Bilateral L4-5 and L5-S1 Lumbar Facet Injection on 5/6/21 with 75% relief for a" few days and then pain returned"  Pain intensity is currently 7/10.      HPI:   Annika Mac is a 70 y.o. female with numerous problems resulting from, or exacerbated by, morbid obesity who presents complaining of chronic low back pain.  She states longstanding low back pain with a minor component of pain radiating into the back of the legs, all way down to the calf, bilaterally.  She reports rapidly increasing back pain with standing or walking for more than 5 min, requiring her to sit and rest.  Pain decreases very quickly when resting.  In 2014, she received a bilateral facet steroid injection in the low back.  At that time, the provider documented 90% relief of pain.    Location: low back  Onset: 2-3 months " "ago  Current Pain Score: 7/10  Daily Pain of Range: 5-8/10  Quality: Aching, Sharp and Shooting  Radiation: Buttocks  Worsened by: daily activity  Improved by: nothing    Previous Therapies:  PT/OT: Yes, and currently restarting PT  HEP: no  Interventions:   Bilateral facet injection L4-5 and L5-S1 in 2014 with 90% relief  Surgery: Denies back surgery  Medications:   - NSAIDS:   - MSK Relaxants:   - TCAs:   - SNRIs:   - Topicals:   - Anticonvulsants:  - Opioids:     Current Pain Medications:  1. Lyrica 150 mg b.i.d.  2. Cymbalta 30 mg daily    Review of Systems:  Review of Systems   Constitutional: Negative for chills and fever.   HENT: Negative for nosebleeds.    Eyes: Negative for blurred vision.   Respiratory: Negative for hemoptysis.    Cardiovascular: Negative for chest pain and palpitations.   Gastrointestinal: Negative for heartburn and vomiting.   Genitourinary: Negative for hematuria.   Musculoskeletal: Positive for back pain and joint pain. Negative for falls and myalgias.   Skin: Negative for rash.   Neurological: Negative for tingling, focal weakness, seizures and loss of consciousness.   Endo/Heme/Allergies: Does not bruise/bleed easily.   Psychiatric/Behavioral: Negative for hallucinations.       History:    Current Outpatient Medications:     albuterol (PROAIR HFA) 90 mcg/actuation inhaler, Inhale 2 puffs into the lungs every 6 (six) hours as needed for Wheezing. Rescue, Disp: 18 g, Rfl: 6    allopurinoL (ZYLOPRIM) 300 MG tablet, Take 1 tablet (300 mg total) by mouth once daily., Disp: 90 tablet, Rfl: 4    amLODIPine (NORVASC) 10 MG tablet, Take 1 tablet (10 mg total) by mouth once daily., Disp: 90 tablet, Rfl: 3    ammonium lactate 12 % Crea, Apply twice daily to affected parts both feet as needed., Disp: 140 g, Rfl: 11    aspirin 81 MG Chew, Take 1 tablet (81 mg total) by mouth once daily., Disp: , Rfl: 0    BD ULTRA-FINE KIKA PEN NEEDLE 32 gauge x 5/32" Ndle, To use 4 times daily, Disp: 200 " each, Rfl: 20    betamethasone dipropionate (DIPROLENE) 0.05 % cream, Apply topically 2 (two) times daily. Prn hand rash, Disp: 45 g, Rfl: 0    blood sugar diagnostic Strp, 1 each by Misc.(Non-Drug; Combo Route) route 3 (three) times daily. Dx code  E11.42 . Contour Next, Disp: 200 each, Rfl: 12    blood-glucose meter kit, 1 each by Other route once daily. Use daily. Insurance proffered one touch  E11.42 (Patient taking differently: 1 each by Other route once daily. Contour next .  Insurance proffered one touch  E11.42), Disp: 1 each, Rfl: 0    blood-glucose sensor (DEXCOM G6 SENSOR) Terese, Use as Directed, Disp: 2 Device, Rfl: 11    blood-glucose transmitter (DEXCOM G6 TRANSMITTER) Terese, Every 3 months. Use as Directed to check blood glucose., Disp: 1 Device, Rfl: 3    chlorthalidone (HYGROTEN) 25 MG Tab, Take 1 tablet (25 mg total) by mouth once daily., Disp: 30 tablet, Rfl: 11    colchicine (COLCRYS) 0.6 mg tablet, TAKE 1 TABLET EVERY OTHER DAY, Disp: 45 tablet, Rfl: 3    diclofenac sodium (VOLTAREN) 1 % Gel, Apply 2 g topically 4 (four) times daily., Disp: 1 Tube, Rfl: 2    DULoxetine (CYMBALTA) 30 MG capsule, Take 1 capsule (30 mg total) by mouth once daily., Disp: 90 capsule, Rfl: 3    famotidine (PEPCID) 20 MG tablet, Take 1 tablet (20 mg total) by mouth 2 (two) times daily., Disp: 1 tablet, Rfl: 0    fexofenadine (ALLEGRA) 180 MG tablet, TAKE 1 TABLET BY MOUTH ONCE DAILY, Disp: 30 tablet, Rfl: 0    glucagon (GVOKE HYPOPEN 1-PACK) 0.5 mg/0.1 mL AtIn, Inject 1 Dose into the skin daily as needed (for severe hypoglycemia if you aren't able to treat by ingesting sugar). (Patient not taking: Reported on 5/17/2021), Disp: 1 Syringe, Rfl: 3    HYDROcodone-acetaminophen (NORCO) 7.5-325 mg per tablet, Take 1 tablet by mouth every 6 (six) hours as needed. (Patient not taking: Reported on 4/7/2021), Disp: 28 tablet, Rfl: 0    insulin NPH-insulin regular, 70/30, (NOVOLIN 70/30 U-100 INSULIN) 100 unit/mL  (70-30) injection, 120 UNITS INTO THE SKIN WITH BREAKFAST, 110 UNITS WITH LUNCH, AND INJECT 90 UNITS WITH DINNER ; . (Patient taking differently: 110 UNITS INTO THE SKIN WITH BREAKFAST, 85 UNITS WITH LUNCH, AND INJECT 65 UNITS WITH DINNER ; .), Disp: 30 mL, Rfl: 3    irbesartan (AVAPRO) 300 MG tablet, Take 1 tablet (300 mg total) by mouth every evening., Disp: 90 tablet, Rfl: 3    ketoconazole (NIZORAL) 2 % cream, Apply topically once daily., Disp: 1 Tube, Rfl: 2    lancets 31 gauge Misc, 1 lancet by Misc.(Non-Drug; Combo Route) route 4 (four) times daily. E11.42 . Prescribed one touch, Disp: 200 each, Rfl: 6    LIDOcaine HCL 2% (XYLOCAINE) 2 % jelly, Apply topically as needed. Apply topically once nightly to affected part of foot/feet. (Patient not taking: Reported on 5/17/2021), Disp: 30 mL, Rfl: 2    methylPREDNISolone (MEDROL DOSEPACK) 4 mg tablet, use as directed (Patient not taking: Reported on 5/17/2021), Disp: 1 Package, Rfl: 0    multivitamin (THERAGRAN) per tablet, Take 1 tablet by mouth once daily., Disp: , Rfl:     pregabalin (LYRICA) 150 MG capsule, Take 1 capsule (150 mg total) by mouth 2 (two) times daily., Disp: 180 capsule, Rfl: 3    semaglutide (OZEMPIC) 1 mg/dose (2 mg/1.5 mL) PnIj, Inject 0.75 mLs into the skin every 7 days. (1 mg every week), Disp: 9 mL, Rfl: 4    simvastatin (ZOCOR) 40 MG tablet, Take 1 tablet (40 mg total) by mouth every evening., Disp: 90 tablet, Rfl: 3    sodium bicarbonate 325 MG tablet, Take 2 tablets (650 mg total) by mouth 2 (two) times daily., Disp: 120 tablet, Rfl: 11    topiramate (TOPAMAX) 50 MG tablet, Take 1 tablet (50 mg total) by mouth 2 (two) times daily., Disp: 180 tablet, Rfl: 0    traMADol (ULTRAM) 50 mg tablet, Take 1 tablet (50 mg total) by mouth every 8 (eight) hours as needed., Disp: 90 tablet, Rfl: 3  No current facility-administered medications for this visit.    Facility-Administered Medications Ordered in Other Visits:      fentaNYL injection 25 mcg, 25 mcg, Intravenous, Q5 Min PRN, Desmond Fontana MD, 50 mcg at 21 0955    midazolam (VERSED) 1 mg/mL injection 0.5 mg, 0.5 mg, Intravenous, PRN, Desmond Fontana MD, 2 mg at 21 0955    Past Medical History:   Diagnosis Date    Asthma     Constipation 10/3/2019    Deep vein thrombophlebitis of left leg 2012    Deep vein thrombosis     when she had a knee replacement    Encounter for blood transfusion     anemic     GERD (gastroesophageal reflux disease)     Obesity, diabetes, and hypertension syndrome 2019    Obstructive sleep apnea 2012    PAD (peripheral artery disease) 2013    Type 2 diabetes mellitus with obesity 2020    Type 2 diabetes mellitus with peripheral artery disease 2020       Past Surgical History:   Procedure Laterality Date    CATARACT EXTRACTION W/  INTRAOCULAR LENS IMPLANT Left 3/2002    left     CATARACT EXTRACTION W/  INTRAOCULAR LENS IMPLANT Right     OD dr. olivares     SECTION      COLONOSCOPY N/A 2018    Procedure: COLONOSCOPY;  Surgeon: Faizan Mac MD;  [facility] ([facility]);  Service: Endoscopy;  Laterality: N/A;    CYST REMOVAL  2011    left side of face    CYSTOSCOPY W/ RETROGRADES Left 2020    Procedure: CYSTOSCOPY, WITH RETROGRADE PYELOGRAM;  Surgeon: Noman Rivas MD;  Location: [address];  Service: Urology;  Laterality: Left;  30min    DE QUERVAIN'S RELEASE  2021    Procedure: RELEASE, HAND, FOR DEQUERVAIN'S TENOSYNOVITIS;  Surgeon: Marky Hagen MD;  Location: [facility] OR;  Service: Orthopedics;;    EYE SURGERY Bilateral     eye implants    GANGLION CYST EXCISION  2007    Right wrist    INJECTION OF FACET JOINT Bilateral 2021    Procedure: INJECTION, FACET JOINT--Bilateral L4-5, L5-S1;  Surgeon: Alireza Meraz Jr., MD;  Location: [facility]IN [facility];  Service: Pain Management;  Laterality: Bilateral;   Oral    INTERPOSITION ARTHROPLASTY OF CARPOMETACARPAL JOINTS Left 1/8/2021    Procedure: INTERPOSITION ARTHROPLASTY, CMC JOINT - left dequervains and cmc arthroplasty, arthrex;  Surgeon: Marky Hagen MD;  Location: [facility];  Service: Orthopedics;  Laterality: Left;    JOINT REPLACEMENT Left     Pan Retinal Photocoagulation Bilateral     Dr. Monterroso (Proliferative Diabetic Retinopathy)    TOTAL ABDOMINAL HYSTERECTOMY  1982    TOTAL KNEE ARTHROPLASTY  2/2006    left    TRIGGER FINGER RELEASE  9/18/2009       Family History   Problem Relation Age of Onset    Diabetes Mother     Hypertension Mother     Heart disease Father     Diabetes Sister     Thyroid disease Brother     Diabetes Brother     Hypertension Brother     No Known Problems Daughter     No Known Problems Son     No Known Problems Daughter     Amblyopia Neg Hx     Blindness Neg Hx     Glaucoma Neg Hx     Macular degeneration Neg Hx     Retinal detachment Neg Hx     Strabismus Neg Hx     Colon cancer Neg Hx     Esophageal cancer Neg Hx        Social History     Socioeconomic History    Marital status:      Spouse name: Heber    Number of children: 3    Years of education: Not on file    Highest education level: Not on file   Occupational History    Occupation:    Tobacco Use    Smoking status: Never Smoker    Smokeless tobacco: Never Used   Substance and Sexual Activity    Alcohol use: No    Drug use: No    Sexual activity: Yes     Partners: Male   Other Topics Concern    Not on file   Social History Narrative    , lives with family; 3 children, 4 grandchildren     Social Determinants of Health     Financial Resource Strain:     Difficulty of Paying Living Expenses:    Food Insecurity:     Worried About Running Out of Food in the Last Year:     Ran Out of Food in the Last Year:    Transportation Needs:     Lack of Transportation (Medical):     Lack of Transportation (Non-Medical):     Physical Activity:     Days of Exercise per Week:     Minutes of Exercise per Session:    Stress: No Stress Concern Present    Feeling of Stress : Not at all   Social Connections:     Frequency of Communication with Friends and Family:     Frequency of Social Gatherings with Friends and Family:     Attends Voodoo Services:     Active Member of Clubs or Organizations:     Attends Club or Organization Meetings:     Marital Status:        Review of patient's allergies indicates:   Allergen Reactions    Iodinated contrast media Rash       Physical Exam:  There were no vitals filed for this visit.  General    Nursing note and vitals reviewed.  Constitutional: She is oriented to person, place, and time. She appears well-developed and well-nourished. No distress.   HENT:   Head: Normocephalic and atraumatic.   Nose: Nose normal.   Eyes: Conjunctivae and EOM are normal. Pupils are equal, round, and reactive to light. Right eye exhibits no discharge. Left eye exhibits no discharge. No scleral icterus.   Neck: No JVD present.   Cardiovascular: Intact distal pulses.    Pulmonary/Chest: Effort normal. No respiratory distress.   Abdominal: She exhibits no distension.   Neurological: She is alert and oriented to person, place, and time. Coordination normal.   Psychiatric: She has a normal mood and affect. Her behavior is normal. Judgment and thought content normal.     General Musculoskeletal Exam   Gait: normal       Right Knee Exam     Inspection   Swelling: present  Deformity: absent    Tenderness   The patient is tender to palpation of the medial joint line and lateral joint line.    Range of Motion   Extension: normal Right knee extension grade: painful.   Flexion: normal Right knee flexion: painful.     Other   Sensation: normal    Left Knee Exam     Inspection   Swelling: present  Deformity: absent    Tenderness   The patient tender to palpation of the medial joint line and lateral joint line.    Range of  Motion   Extension: normal Left knee extension grade: painful.   Flexion: normal Left knee flexion: painful.     Other   Sensation: normalBack (L-Spine & T-Spine) / Neck (C-Spine) Exam     Tenderness Right paramedian tenderness of the Lower L-Spine. Left paramedian tenderness of the Lower L-Spine.     Back (L-Spine & T-Spine) Range of Motion   Back extension: facet loading is positive and exacerabtes/reproduces the patient's typical low back pain    Back flexion: limited ROM but partial relief of low back pain noted.     Spinal Sensation   Right Side Sensation  L-Spine Level: normal  Left Side Sensation  L-Spine Level: normal    Other She has no scoliosis .      Muscle Strength   Right Lower Extremity   Hip Flexion: 5/5   Hip Extensors: 5/5  Quadriceps:  5/5   Hamstrin/5   Gastrocsoleus:  5/5   Left Lower Extremity   Hip Flexion: 5/5   Hip Extensors: 5/5  Quadriceps:  5/5   Hamstrin/5   Gastrocsoleus:  5/5     Reflexes     Left Side  Achilles:  2+  Quadriceps:  2+    Right Side   Achilles:  2+  Quadriceps:  2+      Imagin2021  X-ray Knee Ortho Right  CLINICAL HISTORY:  R KNEE; Pain in right knee  TECHNIQUE:  AP standing of both knees, Merchant views of both knees as well as a lateral view of the right knee were performed.  COMPARISON:  2018  FINDINGS:  No acute fracture seen.  No significant suprapatellar joint effusion.  Tricompartmental osteophyte formation with advanced narrowing of the medial and patellofemoral compartments.  Previous left knee arthroplasty with metallic components in good position.  Impression:  Osteoarthritis with advanced joint space narrowing.    Labs:  BMP  Lab Results   Component Value Date     2021    K 4.0 2021     2021    CO2 26 2021    BUN 30 (H) 2021    CREATININE 1.8 (H) 2021    CALCIUM 9.0 2021    ANIONGAP 8 2021    ESTGFRAFRICA 32.2 (A) 2021    EGFRNONAA 27.9 (A) 2021     Lab Results    Component Value Date    ALT 18 03/22/2021    AST 24 03/22/2021    ALKPHOS 122 03/22/2021    BILITOT 0.3 03/22/2021     Wt Readings from Last 5 Encounters:   05/17/21 (!) 142.9 kg (315 lb 0.6 oz)   05/06/21 95.3 kg (210 lb)   04/15/21 (!) 145.6 kg (320 lb 15.8 oz)   04/07/21 (!) 145.6 kg (321 lb)   03/30/21 (!) 146.5 kg (323 lb)       Assessment:  Problem List Items Addressed This Visit        Neuro    DDD (degenerative disc disease), lumbar    Chronic pain       Orthopedic    Chronic back pain      Other Visit Diagnoses     Lumbar spondylosis    -  Primary    Arthropathy of facet joints at multiple levels        Primary osteoarthritis of both knees              4/28/21 - Annika Mac is a 70 y.o. female with diffuse chronic pain related to degenerative joint disease and degenerative spine disease complicated by morbid obesity and likely sedentary lifestyle as well.  Patient is currently receiving treatment for weight reduction by the Bariatric Medicine Department and follows up with Rheumatology, along with a multitude of other providers.  She received a bilateral lumbar facet injection for low back pain in 2014 which provided her 90% relief based on postoperative assessment.  I recommended repeating this injection.  While patient was referred for knee pain, she chose to focus on low back pain during today's encounter; however, we can treat her knee pain in the future if that is something she wants to discuss.  When she was last in this clinic in 2014 she received recommendations to lose weight and establish a daily home exercise regimen, both of which remain relevant today.    5/21/2021- Annika Mac is a 70 y.o. female who  has a past medical history of Asthma, Constipation (10/3/2019), Deep vein thrombophlebitis of left leg (7/27/2012), Deep vein thrombosis, Encounter for blood transfusion, GERD (gastroesophageal reflux disease), Obesity, diabetes, and hypertension syndrome (2/13/2019), Obstructive sleep apnea  (7/27/2012), PAD (peripheral artery disease) (11/21/2013), Type 2 diabetes mellitus with obesity (8/19/2020), and Type 2 diabetes mellitus with peripheral artery disease (8/19/2020).  By history and examination this patient has chronic low back pain without  radiculopathy.  The underlying cause cause is facet arthritis, degenerative disc disease, muscle dysfunction and deconditioning.  Pathology is confirmed by imaging.  We discussed the underlying diagnoses and multiple treatment options including non-opioid medications, injections, physical therapy, home exercise, activity modification / rest and weight loss.  The risks and benefits of each treatment option were discussed and all questions were answered.  I discussed with patient I will consult Dr. Meraz to see if he would like to move forward with scheduling the bilateral lumbar RFA, patient verbalized understanding and agreed.  Until that I recommended that she continue with physical therapy as well as stabbing home exercise plan that involves daily stretching and core strengthening.    03/28/[id number]-old morbidly obese female with a history of chronic low back pain related to facet arthropathy, DDD complicated by morbid obesity and a sedentary lifestyle.  She has benefited from facet steroid injections targeting L4-5 and L5-S1, patient today on recommending a diagnostic bilateral lumbar MBB targeting L4-5 L5-S1 and considering her for a lumbar RFA following.  The patient was amenable to this plan also provide her with information explain the procedure even further.   Lastly, we did discuss considering her for a right knee GN block and RFA to help with chronic right knee pian, she is being considered for a right TKA with Dr. Ragland once she has los approximately 10 lbs.       : Reviewed and consistent with medication use as prescribed.    Treatment Plan:   Procedures:  Scheduled for diagnostic bilateral lumbar MBB targeting L4-5 L5-S1 x1 and RFA to  follow.                Consider a right knee GNB and RFA in the future.   PT/OT/HEP:    - attend PT as scheduled   - daily cardio   - daily stretching   - focused HEP  Medications: No changes recommended at this time.  Labs: reviewed and medications are appropriately dosed for current hepatorenal function.  Imaging: No additional recommended at this time.    Follow Up:  p Diagnostic Lumbar ELA Hamilton, NP-C  Interventional Pain Management    Disclaimer: This note was partly generated using dictation software which may occasionally result in transcription errors.

## 2022-03-30 NOTE — PROGRESS NOTES
Subjective:      Patient ID: Annika Mac is a 71 y.o. female.    Chief Complaint:   Pain of the Left Knee and Pain of the Right Knee    HPI  She came in to again discuss her right knee osteoarthritis pain.  She does have some pain associated with her left total knee, but it is much better than the right.  She definitely wants to have another knee replacement when she can get her weight down to qualify.  She has been working on losing weight in order to qualify for knee replacement her pain is typically about 5/10, sometimes considerably worse.  She does have some night pain interfering with sleep.  No groin pain, distal numbness or tingling.  No real change since our last visit.      Objective:        Ortho/SPM Exam    Right knee:There is significant varus deformity, which is not passively correctable.  Active range of motion is 5-95 degrees.  Anterior crepitus with active extension.  Patellar mobility is limited.  Boggy synovitis without effusion.  Medial joint line tenderness predominates.  No instability to varus/valgus/Lachman's stressing.  No pain in the groin with flexion and internal rotation of the hip which is not limited.  Skin intact.  Distal neurovascular intact.  Flip test negative.    Left knee:On exam, the scar is well healed without redness or tenderness.  There is no effusion.  Active range of motion is 0-105° without crepitus.  No instability to varus/valgus stress in extension or flexion.  No excessive sagittal plane instability.  Calves soft, nontender.  Distal neurovascular intact.          IMAGING:  We updated her weight-bearing x-rays today and they show well-fixed and positioned left TKA components without signs of loosening or other complications.  She has end-stage varus arthrosis of the right knee without focal bone defects.  Assessment:   Advanced DJD, right knee      Plan:   She will continue to work on weight loss.  Her goal weight was discussed with her and written down for her again  today.  A note was sent to her pain management specialist to consider additional interventions.    The patient's pathophysiology was explained in detail with reference to x-rays, models, other visual aids as appropriate.  Treatment options were discussed in detail.  Questions were invited and answered to the patient's satisfaction.    Greater than 50% of this 15 minute visit was devoted to counseling and coordination of care      Marino Ragland MD  Orthopedic Surgery

## 2022-03-31 ENCOUNTER — TELEPHONE (OUTPATIENT)
Dept: SLEEP MEDICINE | Facility: CLINIC | Age: 72
End: 2022-03-31
Payer: MEDICARE

## 2022-03-31 NOTE — DISCHARGE INSTRUCTIONS
Home Care Instructions Pain Management:    1.  DIET:    You may resume your normal diet today.    2.  BATHING:    You may shower with luke warm water.    3.  DRESSING:    You may remove your bandage today.    4.  ACTIVITY LEVEL:      You may resume your normal activities 24 hours after your procedure.    5.  MEDICATIONS:    You may resume your normal medications today.    6.  SPECIAL INSTRUCTIONS:    No heat to the injection site for 24 hours including bath or shower, heating pad, moist heat or hot tubs.    Use an ice pack to the injection site for any pain or discomfort.  Apply ice packs for 20 minute intervals as needed.    If you have received any sedatives by mouth today, you can not drive for 12 hours.    If you have received sedation through an IV, you can not drive for 24 hours.    PLEASE CALL YOUR DOCTOR FOR THE FOLLOWIN.  Redness or swelling around the injection site.  2.  Fever of 101 degrees.  3.  Drainage (pus) from the injection site.  4.  For any continuous bleeding (some dried blood over the incision is normal.)    FOR EMERGENCIES:    If any unusual problems or difficulties occur during clinic hours, call [phone] or dial [phone].    Follow up with with your physician in 2-3 weeks.

## 2022-04-01 ENCOUNTER — OFFICE VISIT (OUTPATIENT)
Dept: SLEEP MEDICINE | Facility: CLINIC | Age: 72
End: 2022-04-01
Payer: MEDICARE

## 2022-04-01 VITALS
WEIGHT: 293 LBS | BODY MASS INDEX: 48.82 KG/M2 | SYSTOLIC BLOOD PRESSURE: 122 MMHG | DIASTOLIC BLOOD PRESSURE: 67 MMHG | HEART RATE: 77 BPM | HEIGHT: 65 IN

## 2022-04-01 DIAGNOSIS — R06.09 DOE (DYSPNEA ON EXERTION): ICD-10-CM

## 2022-04-01 DIAGNOSIS — G47.33 OSA (OBSTRUCTIVE SLEEP APNEA): Primary | ICD-10-CM

## 2022-04-01 DIAGNOSIS — N18.4 TYPE 2 DM WITH CKD STAGE 4 AND HYPERTENSION: Chronic | ICD-10-CM

## 2022-04-01 DIAGNOSIS — I10 ESSENTIAL HYPERTENSION: ICD-10-CM

## 2022-04-01 DIAGNOSIS — E11.22 TYPE 2 DM WITH CKD STAGE 4 AND HYPERTENSION: Chronic | ICD-10-CM

## 2022-04-01 DIAGNOSIS — I12.9 TYPE 2 DM WITH CKD STAGE 4 AND HYPERTENSION: Chronic | ICD-10-CM

## 2022-04-01 DIAGNOSIS — I13.0 HYPERTENSIVE HEART AND CHRONIC KIDNEY DISEASE WITH HEART FAILURE AND STAGE 1 THROUGH STAGE 4 CHRONIC KIDNEY DISEASE, OR UNSPECIFIED CHRONIC KIDNEY DISEASE: ICD-10-CM

## 2022-04-01 PROCEDURE — 3060F PR POS MICROALBUMINURIA RESULT DOCUMENTED/REVIEW: ICD-10-PCS | Mod: CPTII,S$GLB,, | Performed by: NURSE PRACTITIONER

## 2022-04-01 PROCEDURE — 3074F PR MOST RECENT SYSTOLIC BLOOD PRESSURE < 130 MM HG: ICD-10-PCS | Mod: CPTII,S$GLB,, | Performed by: NURSE PRACTITIONER

## 2022-04-01 PROCEDURE — 3078F DIAST BP <80 MM HG: CPT | Mod: CPTII,S$GLB,, | Performed by: NURSE PRACTITIONER

## 2022-04-01 PROCEDURE — 3060F POS MICROALBUMINURIA REV: CPT | Mod: CPTII,S$GLB,, | Performed by: NURSE PRACTITIONER

## 2022-04-01 PROCEDURE — 3074F SYST BP LT 130 MM HG: CPT | Mod: CPTII,S$GLB,, | Performed by: NURSE PRACTITIONER

## 2022-04-01 PROCEDURE — 1125F PR PAIN SEVERITY QUANTIFIED, PAIN PRESENT: ICD-10-PCS | Mod: CPTII,S$GLB,, | Performed by: NURSE PRACTITIONER

## 2022-04-01 PROCEDURE — 4010F PR ACE/ARB THEARPY RXD/TAKEN: ICD-10-PCS | Mod: CPTII,S$GLB,, | Performed by: NURSE PRACTITIONER

## 2022-04-01 PROCEDURE — 4010F ACE/ARB THERAPY RXD/TAKEN: CPT | Mod: CPTII,S$GLB,, | Performed by: NURSE PRACTITIONER

## 2022-04-01 PROCEDURE — 3008F BODY MASS INDEX DOCD: CPT | Mod: CPTII,S$GLB,, | Performed by: NURSE PRACTITIONER

## 2022-04-01 PROCEDURE — 99214 OFFICE O/P EST MOD 30 MIN: CPT | Mod: S$GLB,,, | Performed by: NURSE PRACTITIONER

## 2022-04-01 PROCEDURE — 1159F MED LIST DOCD IN RCRD: CPT | Mod: CPTII,S$GLB,, | Performed by: NURSE PRACTITIONER

## 2022-04-01 PROCEDURE — 3288F FALL RISK ASSESSMENT DOCD: CPT | Mod: CPTII,S$GLB,, | Performed by: NURSE PRACTITIONER

## 2022-04-01 PROCEDURE — 1101F PR PT FALLS ASSESS DOC 0-1 FALLS W/OUT INJ PAST YR: ICD-10-PCS | Mod: CPTII,S$GLB,, | Performed by: NURSE PRACTITIONER

## 2022-04-01 PROCEDURE — 1125F AMNT PAIN NOTED PAIN PRSNT: CPT | Mod: CPTII,S$GLB,, | Performed by: NURSE PRACTITIONER

## 2022-04-01 PROCEDURE — 3066F PR DOCUMENTATION OF TREATMENT FOR NEPHROPATHY: ICD-10-PCS | Mod: CPTII,S$GLB,, | Performed by: NURSE PRACTITIONER

## 2022-04-01 PROCEDURE — 3008F PR BODY MASS INDEX (BMI) DOCUMENTED: ICD-10-PCS | Mod: CPTII,S$GLB,, | Performed by: NURSE PRACTITIONER

## 2022-04-01 PROCEDURE — 3066F NEPHROPATHY DOC TX: CPT | Mod: CPTII,S$GLB,, | Performed by: NURSE PRACTITIONER

## 2022-04-01 PROCEDURE — 99214 PR OFFICE/OUTPT VISIT, EST, LEVL IV, 30-39 MIN: ICD-10-PCS | Mod: S$GLB,,, | Performed by: NURSE PRACTITIONER

## 2022-04-01 PROCEDURE — 1101F PT FALLS ASSESS-DOCD LE1/YR: CPT | Mod: CPTII,S$GLB,, | Performed by: NURSE PRACTITIONER

## 2022-04-01 PROCEDURE — 3044F HG A1C LEVEL LT 7.0%: CPT | Mod: CPTII,S$GLB,, | Performed by: NURSE PRACTITIONER

## 2022-04-01 PROCEDURE — 3078F PR MOST RECENT DIASTOLIC BLOOD PRESSURE < 80 MM HG: ICD-10-PCS | Mod: CPTII,S$GLB,, | Performed by: NURSE PRACTITIONER

## 2022-04-01 PROCEDURE — 3044F PR MOST RECENT HEMOGLOBIN A1C LEVEL <7.0%: ICD-10-PCS | Mod: CPTII,S$GLB,, | Performed by: NURSE PRACTITIONER

## 2022-04-01 PROCEDURE — 1159F PR MEDICATION LIST DOCUMENTED IN MEDICAL RECORD: ICD-10-PCS | Mod: CPTII,S$GLB,, | Performed by: NURSE PRACTITIONER

## 2022-04-01 PROCEDURE — 3288F PR FALLS RISK ASSESSMENT DOCUMENTED: ICD-10-PCS | Mod: CPTII,S$GLB,, | Performed by: NURSE PRACTITIONER

## 2022-04-01 NOTE — PROGRESS NOTES
"Cc RUFINA, initial visit with me  Last seen by Dr. Casillas 4/15/21, cards referred her back to sleep clinic and appt was with me. She does live closer to [facility]    Using apap presumed 5-11cm inconsistently and had pressure intolerance/smothering sensation but used nose mask w/o chin strap. Machine was taken back by DME. She had requal PSG 9/2021 and is ready to resume using machine.   hgba1c 6.6  bp stable      Split night study 11/2013 showed AHI 23.7 that was reduced to 3.3 at 8 cm H20.   She was on CPAP for about a year.   She felt like her air pressure was not set correctly.   PSG 2/2021 AHI 12.3  PSG 9/2021 AHI 6.3 supine      /67   Pulse 77   Ht 5' 5" (1.651 m)   Wt 133.1 kg (293 lb 6.2 oz)   BMI 48.82 kg/m²     Assessment  Mild rufina, failed to meet compliance guidelines due to smothering sensation and lack of adherence monitoring and machine returned to DME. Has ongoing [id number]. Had requal PSG 9/2021 and ready to resume pap  HTN,. CKD stage IV, DM2      Plan  RESUME PAP, get new machine apap 5-11cm with CHIN strap or somnifix and rtc 5 wks adherence, [facility]   Consider bipap switch if true pressure intolerance.    See PCP re: HTN/DM mgt/continue meds      "

## 2022-04-04 ENCOUNTER — TELEPHONE (OUTPATIENT)
Dept: PAIN MEDICINE | Facility: CLINIC | Age: 72
End: 2022-04-04
Payer: MEDICARE

## 2022-04-05 ENCOUNTER — HOSPITAL ENCOUNTER (OUTPATIENT)
Facility: HOSPITAL | Age: 72
Discharge: HOME | End: 2022-04-05
Attending: PAIN MEDICINE | Admitting: PAIN MEDICINE
Payer: MEDICARE

## 2022-04-05 VITALS
RESPIRATION RATE: 14 BRPM | TEMPERATURE: 98 F | OXYGEN SATURATION: 98 % | WEIGHT: 291 LBS | HEART RATE: 68 BPM | SYSTOLIC BLOOD PRESSURE: 127 MMHG | DIASTOLIC BLOOD PRESSURE: 59 MMHG | BODY MASS INDEX: 49.68 KG/M2 | HEIGHT: 64 IN

## 2022-04-05 DIAGNOSIS — G89.29 CHRONIC PAIN: ICD-10-CM

## 2022-04-05 DIAGNOSIS — M47.816 LUMBAR FACET ARTHROPATHY: Primary | ICD-10-CM

## 2022-04-05 LAB — POCT GLUCOSE: 215 MG/DL (ref 70–110)

## 2022-04-05 PROCEDURE — 64493 INJ PARAVERT F JNT L/S 1 LEV: CPT | Mod: 50,KX,, | Performed by: PAIN MEDICINE

## 2022-04-05 PROCEDURE — 64494 PR INJ DX/THER AGNT PARAVERT FACET JOINT,IMG GUIDE,LUMBAR/SAC, 2ND LEVEL: ICD-10-PCS | Mod: 50,KX,, | Performed by: PAIN MEDICINE

## 2022-04-05 PROCEDURE — 64493 PR INJ DX/THER AGNT PARAVERT FACET JOINT,IMG GUIDE,LUMBAR/SAC,1ST LVL: ICD-10-PCS | Mod: 50,KX,, | Performed by: PAIN MEDICINE

## 2022-04-05 PROCEDURE — 25000003 PHARM REV CODE 250: Performed by: PAIN MEDICINE

## 2022-04-05 PROCEDURE — 64494 INJ PARAVERT F JNT L/S 2 LEV: CPT | Mod: 50,KX,, | Performed by: PAIN MEDICINE

## 2022-04-05 PROCEDURE — 64494 INJ PARAVERT F JNT L/S 2 LEV: CPT | Mod: 50 | Performed by: PAIN MEDICINE

## 2022-04-05 PROCEDURE — 64493 INJ PARAVERT F JNT L/S 1 LEV: CPT | Mod: 50 | Performed by: PAIN MEDICINE

## 2022-04-05 PROCEDURE — 25500020 PHARM REV CODE 255: Performed by: PAIN MEDICINE

## 2022-04-05 PROCEDURE — A9579 GAD-BASE MR CONTRAST NOS,1ML: HCPCS | Performed by: PAIN MEDICINE

## 2022-04-05 RX ORDER — INDOMETHACIN 25 MG/1
CAPSULE ORAL
Status: DISCONTINUED | OUTPATIENT
Start: 2022-04-05 | End: 2022-04-05 | Stop reason: HOSPADM

## 2022-04-05 RX ORDER — LIDOCAINE HYDROCHLORIDE 10 MG/ML
INJECTION INFILTRATION; PERINEURAL
Status: DISCONTINUED | OUTPATIENT
Start: 2022-04-05 | End: 2022-04-05 | Stop reason: HOSPADM

## 2022-04-05 RX ORDER — LIDOCAINE HYDROCHLORIDE 20 MG/ML
INJECTION, SOLUTION EPIDURAL; INFILTRATION; INTRACAUDAL; PERINEURAL
Status: DISCONTINUED | OUTPATIENT
Start: 2022-04-05 | End: 2022-04-05 | Stop reason: HOSPADM

## 2022-04-05 NOTE — PROGRESS NOTES
Dr. Meraz notified of patient's elevated blood glucose. Per Dr. Meraz, ok to proceed with today's procedure. Patient education provided - if patient has RFA in the future her blood glucose should be lower, and she should not skip her dose of a.m. insulin pre-procedure.

## 2022-04-05 NOTE — DISCHARGE SUMMARY
"[facility]  Discharge Note  Short Stay     Admit Date: 4/5/2022    Discharge Date: 4/5/2022     Attending Physician: Alireza Meraz Jr, MD    Diagnoses:  There are no hospital problems to display for this patient.    Discharged Condition: Good     Hospital Course: Patient was admitted for an outpatient interventional pain management procedure and tolerated the procedure well with no complications.     Final Diagnoses: Same as principal problem.     Disposition: Home or Self Care     Follow up/Patient Instructions:        Reconciled Medications:     Medication List      CONTINUE taking these medications    albuterol 90 mcg/actuation inhaler  Commonly known as: PROAIR HFA  Inhale 2 puffs into the lungs every 6 (six) hours as needed for Wheezing. Rescue     allopurinoL 300 MG tablet  Commonly known as: ZYLOPRIM  Take 1 tablet (300 mg total) by mouth once daily.     amLODIPine 10 MG tablet  Commonly known as: NORVASC  Take 1 tablet (10 mg total) by mouth once daily.     ammonium lactate 12 % Crea  Apply twice daily to affected parts both feet as needed.     aspirin 81 MG Chew  Take 1 tablet (81 mg total) by mouth once daily.     BD ULTRA-FINE KIKA PEN NEEDLE 32 gauge x 5/32" Ndle  Generic drug: pen needle, diabetic  To use 4 times daily     blood sugar diagnostic Strp  1 strip 4 times daily. Contour Next.     blood-glucose meter kit  1 each by Other route once daily. Use daily. Insurance proffered one touch  E11.42     chlorthalidone 25 MG Tab  Commonly known as: HYGROTEN  Take 1 tablet (25 mg total) by mouth once daily.     colchicine 0.6 mg tablet  Commonly known as: COLCRYS  TAKE 1 TABLET EVERY OTHER DAY     DULoxetine 30 MG capsule  Commonly known as: CYMBALTA  TAKE 1 CAPSULE BY MOUTH  ONCE DAILY     famotidine 20 MG tablet  Commonly known as: PEPCID  Take 1 tablet (20 mg total) by mouth 2 (two) times daily.     ferrous sulfate Tab tablet  Commonly known as: FEOSOL  Take 1 tablet (1 each total) by " mouth once daily.     GVOKE HYPOPEN 1-PACK 0.5 mg/0.1 mL Atin  Generic drug: glucagon  Inject 1 Dose into the skin daily as needed (for severe hypoglycemia if you aren't able to treat by ingesting sugar).     HumaLOG KwikPen Insulin 100 unit/mL pen  Generic drug: insulin lispro  Inject 10 Units into the skin 3 (three) times daily with meals.     HumuLIN 70/30 U-100 KwikPen 100 unit/mL (70-30) Inpn pen  Generic drug: insulin NPH/Reg human  Inject into the skin.     irbesartan 300 MG tablet  Commonly known as: AVAPRO  Take 1 tablet (300 mg total) by mouth every evening.     labetaloL 300 MG tablet  Commonly known as: NORMODYNE  Take 1 tablet (300 mg total) by mouth 2 (two) times daily.     lancets 31 gauge Misc  1 lancet by Misc.(Non-Drug; Combo Route) route 4 (four) times daily. E11.42 . Prescribed one touch     multivitamin per tablet  Commonly known as: THERAGRAN  Take 1 tablet by mouth once daily.     pregabalin 150 MG capsule  Commonly known as: LYRICA  Take 1 capsule (150 mg total) by mouth 2 (two) times daily.     semaglutide 1 mg/dose (4 mg/3 mL)  Commonly known as: OZEMPIC  Inject 1 mg into the skin every 7 days.     simvastatin 40 MG tablet  Commonly known as: ZOCOR  Take 1 tablet (40 mg total) by mouth every evening.     sodium bicarbonate 325 MG tablet  Take 2 tablets (650 mg total) by mouth 2 (two) times daily.     TRESIBA FLEXTOUCH U-100 100 unit/mL (3 mL) insulin pen  Generic drug: insulin degludec  Inject 30 Units into the skin once daily.           Discharge Procedure Orders (must include Diet, Follow-up, Activity)   Diet Adult Regular     No driving until:   Order Comments: If you received sedation, no driving for 12 hrs     Remove dressing in 24 hours     Notify your health care provider if you experience any of the following:  temperature >100.4     Notify your health care provider if you experience any of the following:  severe uncontrolled pain     Notify your health care provider if you  experience any of the following:  redness, tenderness, or signs of infection (pain, swelling, redness, odor or green/yellow discharge around incision site)     Notify your health care provider if you experience any of the following:  difficulty breathing or increased cough     Notify your health care provider if you experience any of the following:  severe persistent headache     Notify your health care provider if you experience any of the following:  increased confusion or weakness     Activity as tolerated       Alireza Meraz Jr, MD  Interventional Pain Medicine / Anesthesiology

## 2022-04-05 NOTE — INTERVAL H&P NOTE
No significant changes were noted from the H&P or last clinic note.    The risks and benefits of this intervention, and alternative therapies were discussed with the patient.  The discussion of risks included infection, bleeding, need for additional procedures or surgery, nerve damage, paralysis, adverse medication reaction(s), stroke, and/or death.  Questions regarding the procedure, risks, expected outcome, and possible side effects were solicited and answered to the patient's satisfaction.  Annika Mac wishes to proceed with the injection or procedure.  Written consent was obtained.    Alireza Meraz Jr, MD  Interventional Pain Medicine / Anesthesiology

## 2022-04-05 NOTE — OP NOTE
"Procedure Note    Procedure Date: 04/05/2022  Pre-operative diagnosis: Lumbar Spondylosis  Post-operative diagnosis: Lumbar Spondylosis  Procedure:  (1) Diagnostic Bilateral L4-5 Lumbar Medial Branch Block    (2) Diagnostic Bilateral L5-S1 Lumbar Medial Branch Block      Indication: This is a diagnostic block for the management of chronic pain related to lumbar spondylosis refractory to conservative therapy such as medications, physical therapy, and/or home exercise.    Procedure in detail:  Informed consent obtained after explaining the procedure and potential complications including local discomfort, infection, headache, temporary or permanent weakness and/or numbness of one or both legs, temporary or permanent paraplegia, heart attack and stroke. All questions were answered.      Patient was taken to the procedure room and placed in prone position.  Time out was performed.  Patient's Lumbar area was prepped and draped in a sterile manner.  AP and oblique fluoroscopy were used to identify and trina the junctions between the superior articular processes and transverse processes at the L4 and L5 vertebral levels as well as the sacral ala on the right side.  Skin and tissues overlying the targeted points were anesthetized with Lidocaine 1% (10 mL) + sodium bicarbonate (1 mL) using a 25G 1.25" needle.  Then, under fluoroscopic guidance, a 22 G 5 inch spinal needle, was advanced through the anesthetized tissues toward the targeted points corresponding to the locations of the L3, L4, and L5 medial branch nerves which innervate the L4-5 and L5-S1 facets.     After heme-negative aspiration, up to 0.2-0.4 mL of contrast was administered through each needle demonstrating local accumulation under fluoroscopy at the target.  Needle placement and contrast dispersion were consistent with a successful procedure  One mL of Lidocaine 2% was injected at each location.  The needle were then removed intact and adhesive bandages placed " over the puncture sites.      The procedure was repeated on the left side with similar fluoroscopic findings.      Estimated Blood Loss: None    Disposition: The patient tolerated the procedure without complaint and was transported to the recovery room in stable condition.    Follow-up: Return for second MBB or RFA if appropriate      Alireza Meraz Jr, MD  Interventional Pain Medicine / Anesthesiology

## 2022-04-07 ENCOUNTER — TELEPHONE (OUTPATIENT)
Dept: SLEEP MEDICINE | Facility: CLINIC | Age: 72
End: 2022-04-07
Payer: MEDICARE

## 2022-04-07 DIAGNOSIS — G47.33 OBSTRUCTIVE SLEEP APNEA: Primary | ICD-10-CM

## 2022-04-07 NOTE — TELEPHONE ENCOUNTER
----- Message from Belen Bella, CRT sent at 4/6/2022  4:55 PM CDT -----  Regarding: RE: add airview  It's been a long day.. okay so she doesn't have a unit from us anymore. She turned it in on 8/12/2021 AMA. Sorry I overlooked that this morning.  ----- Message -----  From: Tammie Ferraro NP  Sent: 4/6/2022   1:49 PM CDT  To: Belen Bella, CRT  Subject: RE: add airview                                  Uggh MICHAEL what either I'm doing wrong or whyI stil lcan't see her, I typed in Annika Mac and Annika Mac and nothing.     ----- Message -----  From: Belen Bella, CRT  Sent: 4/6/2022  12:28 PM CDT  To: Tammie Ferraro NP  Subject: RE: add airview                                  Done.    ----- Message -----  From: Tammie Ferraro NP  Sent: 4/5/2022  12:28 PM CDT  To: Belen Bella, CRT  Subject: add airview                                      In airview nothing pulls up when I type in her name. Help! She will  have f/u appt in 4 wks

## 2022-04-07 NOTE — TELEPHONE ENCOUNTER
Called her today and she has machine she's been suing every night since seen,great!! Needs new masksupplies. Bring lori to visit for interrogation

## 2022-04-11 ENCOUNTER — LAB VISIT (OUTPATIENT)
Dept: LAB | Facility: HOSPITAL | Age: 72
End: 2022-04-11
Attending: INTERNAL MEDICINE
Payer: MEDICARE

## 2022-04-11 ENCOUNTER — OFFICE VISIT (OUTPATIENT)
Dept: PAIN MEDICINE | Facility: CLINIC | Age: 72
End: 2022-04-11
Payer: MEDICARE

## 2022-04-11 ENCOUNTER — TELEPHONE (OUTPATIENT)
Dept: PAIN MEDICINE | Facility: CLINIC | Age: 72
End: 2022-04-11
Payer: MEDICARE

## 2022-04-11 VITALS
SYSTOLIC BLOOD PRESSURE: 135 MMHG | BODY MASS INDEX: 49.95 KG/M2 | HEART RATE: 77 BPM | WEIGHT: 291 LBS | DIASTOLIC BLOOD PRESSURE: 70 MMHG

## 2022-04-11 DIAGNOSIS — M54.16 LUMBAR RADICULOPATHY: ICD-10-CM

## 2022-04-11 DIAGNOSIS — G89.4 CHRONIC PAIN SYNDROME: ICD-10-CM

## 2022-04-11 DIAGNOSIS — M47.816 LUMBAR SPONDYLOSIS: Primary | ICD-10-CM

## 2022-04-11 DIAGNOSIS — E11.42 TYPE 2 DIABETES MELLITUS WITH DIABETIC POLYNEUROPATHY, WITH LONG-TERM CURRENT USE OF INSULIN: ICD-10-CM

## 2022-04-11 DIAGNOSIS — M51.36 DDD (DEGENERATIVE DISC DISEASE), LUMBAR: ICD-10-CM

## 2022-04-11 DIAGNOSIS — M47.816 LUMBAR FACET ARTHROPATHY: ICD-10-CM

## 2022-04-11 DIAGNOSIS — Z79.4 TYPE 2 DIABETES MELLITUS WITH DIABETIC POLYNEUROPATHY, WITH LONG-TERM CURRENT USE OF INSULIN: ICD-10-CM

## 2022-04-11 PROCEDURE — 99214 OFFICE O/P EST MOD 30 MIN: CPT | Mod: S$GLB,,, | Performed by: NURSE PRACTITIONER

## 2022-04-11 PROCEDURE — 1160F PR REVIEW ALL MEDS BY PRESCRIBER/CLIN PHARMACIST DOCUMENTED: ICD-10-PCS | Mod: CPTII,S$GLB,, | Performed by: NURSE PRACTITIONER

## 2022-04-11 PROCEDURE — 1159F PR MEDICATION LIST DOCUMENTED IN MEDICAL RECORD: ICD-10-PCS | Mod: CPTII,S$GLB,, | Performed by: NURSE PRACTITIONER

## 2022-04-11 PROCEDURE — 1125F AMNT PAIN NOTED PAIN PRSNT: CPT | Mod: CPTII,S$GLB,, | Performed by: NURSE PRACTITIONER

## 2022-04-11 PROCEDURE — 3075F PR MOST RECENT SYSTOLIC BLOOD PRESS GE 130-139MM HG: ICD-10-PCS | Mod: CPTII,S$GLB,, | Performed by: NURSE PRACTITIONER

## 2022-04-11 PROCEDURE — 3060F POS MICROALBUMINURIA REV: CPT | Mod: CPTII,S$GLB,, | Performed by: NURSE PRACTITIONER

## 2022-04-11 PROCEDURE — 3044F PR MOST RECENT HEMOGLOBIN A1C LEVEL <7.0%: ICD-10-PCS | Mod: CPTII,S$GLB,, | Performed by: NURSE PRACTITIONER

## 2022-04-11 PROCEDURE — 3008F BODY MASS INDEX DOCD: CPT | Mod: CPTII,S$GLB,, | Performed by: NURSE PRACTITIONER

## 2022-04-11 PROCEDURE — 1160F RVW MEDS BY RX/DR IN RCRD: CPT | Mod: CPTII,S$GLB,, | Performed by: NURSE PRACTITIONER

## 2022-04-11 PROCEDURE — 3078F PR MOST RECENT DIASTOLIC BLOOD PRESSURE < 80 MM HG: ICD-10-PCS | Mod: CPTII,S$GLB,, | Performed by: NURSE PRACTITIONER

## 2022-04-11 PROCEDURE — 3066F PR DOCUMENTATION OF TREATMENT FOR NEPHROPATHY: ICD-10-PCS | Mod: CPTII,S$GLB,, | Performed by: NURSE PRACTITIONER

## 2022-04-11 PROCEDURE — 3066F NEPHROPATHY DOC TX: CPT | Mod: CPTII,S$GLB,, | Performed by: NURSE PRACTITIONER

## 2022-04-11 PROCEDURE — 99999 PR PBB SHADOW E&M-EST. PATIENT-LVL III: CPT | Mod: PBBFAC,,, | Performed by: NURSE PRACTITIONER

## 2022-04-11 PROCEDURE — 99999 PR PBB SHADOW E&M-EST. PATIENT-LVL III: ICD-10-PCS | Mod: PBBFAC,,, | Performed by: NURSE PRACTITIONER

## 2022-04-11 PROCEDURE — 3060F PR POS MICROALBUMINURIA RESULT DOCUMENTED/REVIEW: ICD-10-PCS | Mod: CPTII,S$GLB,, | Performed by: NURSE PRACTITIONER

## 2022-04-11 PROCEDURE — 4010F ACE/ARB THERAPY RXD/TAKEN: CPT | Mod: CPTII,S$GLB,, | Performed by: NURSE PRACTITIONER

## 2022-04-11 PROCEDURE — 4010F PR ACE/ARB THEARPY RXD/TAKEN: ICD-10-PCS | Mod: CPTII,S$GLB,, | Performed by: NURSE PRACTITIONER

## 2022-04-11 PROCEDURE — 3044F HG A1C LEVEL LT 7.0%: CPT | Mod: CPTII,S$GLB,, | Performed by: NURSE PRACTITIONER

## 2022-04-11 PROCEDURE — 1125F PR PAIN SEVERITY QUANTIFIED, PAIN PRESENT: ICD-10-PCS | Mod: CPTII,S$GLB,, | Performed by: NURSE PRACTITIONER

## 2022-04-11 PROCEDURE — 3008F PR BODY MASS INDEX (BMI) DOCUMENTED: ICD-10-PCS | Mod: CPTII,S$GLB,, | Performed by: NURSE PRACTITIONER

## 2022-04-11 PROCEDURE — 3075F SYST BP GE 130 - 139MM HG: CPT | Mod: CPTII,S$GLB,, | Performed by: NURSE PRACTITIONER

## 2022-04-11 PROCEDURE — 83036 HEMOGLOBIN GLYCOSYLATED A1C: CPT | Performed by: INTERNAL MEDICINE

## 2022-04-11 PROCEDURE — 1159F MED LIST DOCD IN RCRD: CPT | Mod: CPTII,S$GLB,, | Performed by: NURSE PRACTITIONER

## 2022-04-11 PROCEDURE — 80053 COMPREHEN METABOLIC PANEL: CPT | Performed by: INTERNAL MEDICINE

## 2022-04-11 PROCEDURE — 99214 PR OFFICE/OUTPT VISIT, EST, LEVL IV, 30-39 MIN: ICD-10-PCS | Mod: S$GLB,,, | Performed by: NURSE PRACTITIONER

## 2022-04-11 PROCEDURE — 3078F DIAST BP <80 MM HG: CPT | Mod: CPTII,S$GLB,, | Performed by: NURSE PRACTITIONER

## 2022-04-11 NOTE — PROGRESS NOTES
Subjective:      Patient ID: Annika Mac is a 71 y.o. female.    Chief Complaint: Toe Injury (Problem with Left foot second toe /nailcare/Pcp-[facility] 3/14/2022), Nail Care, and Diabetes Mellitus    Annika is a 71 y.o. female who presents to the clinic for evaluation and treatment of high risk feet. Annika has a past medical history of Asthma, Chronic obstructive pulmonary disease, unspecified COPD type (1/14/2022), Constipation (10/3/2019), Deep vein thrombophlebitis of left leg (7/27/2012), Deep vein thrombosis, Encounter for blood transfusion, GERD (gastroesophageal reflux disease), Obesity, diabetes, and hypertension syndrome (2/13/2019), Obstructive sleep apnea (7/27/2012), PAD (peripheral artery disease) (11/21/2013), Pressure ulcer of toe of left foot, Type 2 diabetes mellitus with obesity (8/19/2020), and Type 2 diabetes mellitus with peripheral artery disease (8/19/2020). The patient's chief complaint is long, thick toenails. This patient has documented high risk feet requiring routine maintenance secondary to peripheral neuropathy.    PCP: Mamie Cotton MD    Date Last Seen by PCP:   Chief Complaint   Patient presents with    Toe Injury     Problem with Left foot second toe /nailcare  Pcp-[facility] 3/14/2022    Nail Care    Diabetes Mellitus         Current shoe gear:  Affected Foot: Slip-on shoes     Unaffected Foot: Slip-on shoes    Hemoglobin A1C   Date Value Ref Range Status   01/07/2022 6.6 (H) 4.0 - 5.6 % Final     Comment:     ADA Screening Guidelines:  5.7-6.4%  Consistent with prediabetes  >or=6.5%  Consistent with diabetes    High levels of fetal hemoglobin interfere with the HbA1C  assay. Heterozygous hemoglobin variants (HbS, HgC, etc)do  not significantly interfere with this assay.   However, presence of multiple variants may affect accuracy.     11/08/2021 7.1 (H) 4.0 - 5.6 % Final     Comment:     ADA Screening Guidelines:  5.7-6.4%  Consistent with prediabetes  >or=6.5%  Consistent with  diabetes    High levels of fetal hemoglobin interfere with the HbA1C  assay. Heterozygous hemoglobin variants (HbS, HgC, etc)do  not significantly interfere with this assay.   However, presence of multiple variants may affect accuracy.     06/23/2021 6.6 (H) 4.0 - 5.6 % Final     Comment:     ADA Screening Guidelines:  5.7-6.4%  Consistent with prediabetes  >or=6.5%  Consistent with diabetes    High levels of fetal hemoglobin interfere with the HbA1C  assay. Heterozygous hemoglobin variants (HbS, HgC, etc)do  not significantly interfere with this assay.   However, presence of multiple variants may affect accuracy.         Review of Systems   Constitutional: Negative for chills, fever and malaise/fatigue.   HENT: Negative for hearing loss.    Cardiovascular: Negative for claudication.   Respiratory: Negative for shortness of breath.    Skin: Positive for color change, dry skin, nail changes and unusual hair distribution. Negative for flushing and rash.   Musculoskeletal: Negative for joint pain and myalgias.   Neurological: Positive for numbness, paresthesias and sensory change. Negative for loss of balance.   Psychiatric/Behavioral: Negative for altered mental status.           Objective:      Physical Exam  Vitals reviewed.   Constitutional:       Appearance: She is well-developed.   Cardiovascular:      Pulses:           Dorsalis pedis pulses are 0 on the right side and 0 on the left side.        Posterior tibial pulses are 1+ on the right side and 1+ on the left side.   Musculoskeletal:      Right ankle: Decreased range of motion. Abnormal pulse.      Right Achilles Tendon: Patel's test negative.      Left ankle: Decreased range of motion. Abnormal pulse.      Left Achilles Tendon: Patel's test negative.      Right foot: Decreased range of motion.      Left foot: Decreased range of motion.   Feet:      Right foot:      Protective Sensation: 5 sites tested. 2 sites sensed.      Left foot:      Protective  Sensation: 5 sites tested. 2 sites sensed.   Skin:     General: Skin is dry.      Capillary Refill: Capillary refill takes more than 3 seconds.      Comments: Nails x10 are elongated by  4-5mm's, thickened by 2-3 mm's, dystrophic, and are yellowish in  coloration . Xerosis Bilaterally. No open lesions noted.   No wound on left 2nd digit   Neurological:      Mental Status: She is alert.      Comments: diminished sensation noted to b/L lower extremities   Psychiatric:         Behavior: Behavior normal. Behavior is cooperative.               Assessment:       Encounter Diagnoses   Name Primary?    Type 2 diabetes mellitus with diabetic polyneuropathy, with long-term current use of insulin     Disease of nail Yes         Plan:       Annika was seen today for toe injury, nail care and diabetes mellitus.    Diagnoses and all orders for this visit:    Disease of nail    Type 2 diabetes mellitus with diabetic polyneuropathy, with long-term current use of insulin  -     Ambulatory referral/consult to Podiatry      I counseled the patient on her conditions, their implications and medical management.        - Shoe inspection. Diabetic Foot Education. Patient reminded of the importance of good nutrition and blood sugar control to help prevent podiatric complications of diabetes. Patient instructed on proper foot hygeine. We discussed wearing proper shoe gear, daily foot inspections, never walking without protective shoe gear, never putting sharp instruments to feet, routine podiatric nail visits every 2-3 months.      - With patient's permission, nails were aggressively reduced and debrided x 10 to their soft tissue attachment mechanically and with electric , removing all offending nail and debris. Patient relates relief following the procedure. She will continue to monitor the areas daily, inspect her feet, wear protective shoe gear when ambulatory, moisturizer to maintain skin integrity and follow in this office in  approximately 2-3 months, sooner p.r.n.

## 2022-04-11 NOTE — H&P (VIEW-ONLY)
"[facility] Established  Patient Evaluation    Referred by: Dr Marino Ragland  Reason for referral: Right Knee    CC:   Chief Complaint   Patient presents with    Low-back Pain    Leg Pain    Knee Pain   8/26/2014   Pain Disability Index (PDI) 34 21         Interval Updates:  4/11/2022 -  Bubba returns to clinic s/p a Diagnostic Bilateral Lumbar MBB targeting L4/5 and L5/S1 on 4/2/22 with 90% relief with improved functionality.   She reports a pain intensity 6/10 today with a weekly range of 6-7/10.     Interval Updates: 4/11/2022 - Ms. Mac is following up for No chief complaint on file.  Pain is currently rate 8/10 with a weekly range 8-9/10.  Currently her low back pain and his worst we will begin addressing this 1st.  She has seen orthopedics/Dr. Linares he wants her to lose 10 lb prior to consenting her for right TKA.  Sparse her low back pain she actually received benefit from a previous bilateral lumbar facet injection 5% we targeted the L4-5 L5-S1 facet joints.  She complains of low back pain that has continued, pain is located across her low back with mild radiation into her legs bilaterally.  She denies any recent incident, denies any profound weakness, denies any bowel bladder dysfunction, denies any saddle anesthesia at this time.     5/21/21 - Ms. Mac returns to clinic for follow up visit reporting stable low back pain.  Patient is s/p Bilateral L4-5 and L5-S1 Lumbar Facet Injection on 5/6/21 with 75% relief for a" few days and then pain returned"  Pain intensity is currently 7/10.      HPI:   Annika Mac is a 70 y.o. female with numerous problems resulting from, or exacerbated by, morbid obesity who presents complaining of chronic low back pain.  She states longstanding low back pain with a minor component of pain radiating into the back of the legs, all way down to the calf, bilaterally.  She reports rapidly increasing back pain with standing or walking for more than 5 min, requiring " her to sit and rest.  Pain decreases very quickly when resting.  In 2014, she received a bilateral facet steroid injection in the low back.  At that time, the provider documented 90% relief of pain.    Location: low back  Onset: 2-3 months ago  Current Pain Score: 7/10  Daily Pain of Range: 5-8/10  Quality: Aching, Sharp and Shooting  Radiation: Buttocks  Worsened by: daily activity  Improved by: nothing    Previous Therapies:  PT/OT: Yes, and currently restarting PT  HEP: no  Interventions:   4/5/2022 Bilateral facet injection L4-5 and L5-S1 in 2014 with 90% relief  Surgery: Denies back surgery  Medications:   - NSAIDS:   - MSK Relaxants:   - TCAs:   - SNRIs:   - Topicals:   - Anticonvulsants:  - Opioids:     Current Pain Medications:  1. Lyrica 150 mg b.i.d.  2. Cymbalta 30 mg daily    Review of Systems:  Review of Systems   Constitutional: Negative for chills and fever.   HENT: Negative for nosebleeds.    Eyes: Negative for blurred vision.   Respiratory: Negative for hemoptysis.    Cardiovascular: Negative for chest pain and palpitations.   Gastrointestinal: Negative for heartburn and vomiting.   Genitourinary: Negative for hematuria.   Musculoskeletal: Positive for back pain and joint pain. Negative for falls and myalgias.   Skin: Negative for rash.   Neurological: Negative for tingling, focal weakness, seizures and loss of consciousness.   Endo/Heme/Allergies: Does not bruise/bleed easily.   Psychiatric/Behavioral: Negative for hallucinations.       History:    Current Outpatient Medications:     albuterol (PROAIR HFA) 90 mcg/actuation inhaler, Inhale 2 puffs into the lungs every 6 (six) hours as needed for Wheezing. Rescue, Disp: 18 g, Rfl: 6    allopurinoL (ZYLOPRIM) 300 MG tablet, Take 1 tablet (300 mg total) by mouth once daily., Disp: 90 tablet, Rfl: 4    amLODIPine (NORVASC) 10 MG tablet, Take 1 tablet (10 mg total) by mouth once daily., Disp: 90 tablet, Rfl: 3    ammonium lactate 12 % Crea, Apply  "twice daily to affected parts both feet as needed., Disp: 140 g, Rfl: 11    aspirin 81 MG Chew, Take 1 tablet (81 mg total) by mouth once daily., Disp: , Rfl: 0    BD ULTRA-FINE KIKA PEN NEEDLE 32 gauge x 5/32" Ndle, To use 4 times daily, Disp: 200 each, Rfl: 20    betamethasone dipropionate (DIPROLENE) 0.05 % cream, Apply topically 2 (two) times daily. Prn hand rash, Disp: 45 g, Rfl: 0    blood sugar diagnostic Strp, 1 each by Misc.(Non-Drug; Combo Route) route 3 (three) times daily. Dx code  E11.42 . Contour Next, Disp: 200 each, Rfl: 12    blood-glucose meter kit, 1 each by Other route once daily. Use daily. Insurance proffered one touch  E11.42 (Patient taking differently: 1 each by Other route once daily. Contour next .  Insurance proffered one touch  E11.42), Disp: 1 each, Rfl: 0    blood-glucose sensor (DEXCOM G6 SENSOR) Terese, Use as Directed, Disp: 2 Device, Rfl: 11    blood-glucose transmitter (DEXCOM G6 TRANSMITTER) Terese, Every 3 months. Use as Directed to check blood glucose., Disp: 1 Device, Rfl: 3    chlorthalidone (HYGROTEN) 25 MG Tab, Take 1 tablet (25 mg total) by mouth once daily., Disp: 30 tablet, Rfl: 11    colchicine (COLCRYS) 0.6 mg tablet, TAKE 1 TABLET EVERY OTHER DAY, Disp: 45 tablet, Rfl: 3    diclofenac sodium (VOLTAREN) 1 % Gel, Apply 2 g topically 4 (four) times daily., Disp: 1 Tube, Rfl: 2    DULoxetine (CYMBALTA) 30 MG capsule, Take 1 capsule (30 mg total) by mouth once daily., Disp: 90 capsule, Rfl: 3    famotidine (PEPCID) 20 MG tablet, Take 1 tablet (20 mg total) by mouth 2 (two) times daily., Disp: 1 tablet, Rfl: 0    fexofenadine (ALLEGRA) 180 MG tablet, TAKE 1 TABLET BY MOUTH ONCE DAILY, Disp: 30 tablet, Rfl: 0    glucagon (GVOKE HYPOPEN 1-PACK) 0.5 mg/0.1 mL AtIn, Inject 1 Dose into the skin daily as needed (for severe hypoglycemia if you aren't able to treat by ingesting sugar). (Patient not taking: Reported on 5/17/2021), Disp: 1 Syringe, Rfl: 3    " HYDROcodone-acetaminophen (NORCO) 7.5-325 mg per tablet, Take 1 tablet by mouth every 6 (six) hours as needed. (Patient not taking: Reported on 4/7/2021), Disp: 28 tablet, Rfl: 0    insulin NPH-insulin regular, 70/30, (NOVOLIN 70/30 U-100 INSULIN) 100 unit/mL (70-30) injection, 120 UNITS INTO THE SKIN WITH BREAKFAST, 110 UNITS WITH LUNCH, AND INJECT 90 UNITS WITH DINNER ; . (Patient taking differently: 110 UNITS INTO THE SKIN WITH BREAKFAST, 85 UNITS WITH LUNCH, AND INJECT 65 UNITS WITH DINNER ; .), Disp: 30 mL, Rfl: 3    irbesartan (AVAPRO) 300 MG tablet, Take 1 tablet (300 mg total) by mouth every evening., Disp: 90 tablet, Rfl: 3    ketoconazole (NIZORAL) 2 % cream, Apply topically once daily., Disp: 1 Tube, Rfl: 2    lancets 31 gauge Misc, 1 lancet by Misc.(Non-Drug; Combo Route) route 4 (four) times daily. E11.42 . Prescribed one touch, Disp: 200 each, Rfl: 6    LIDOcaine HCL 2% (XYLOCAINE) 2 % jelly, Apply topically as needed. Apply topically once nightly to affected part of foot/feet. (Patient not taking: Reported on 5/17/2021), Disp: 30 mL, Rfl: 2    methylPREDNISolone (MEDROL DOSEPACK) 4 mg tablet, use as directed (Patient not taking: Reported on 5/17/2021), Disp: 1 Package, Rfl: 0    multivitamin (THERAGRAN) per tablet, Take 1 tablet by mouth once daily., Disp: , Rfl:     pregabalin (LYRICA) 150 MG capsule, Take 1 capsule (150 mg total) by mouth 2 (two) times daily., Disp: 180 capsule, Rfl: 3    semaglutide (OZEMPIC) 1 mg/dose (2 mg/1.5 mL) PnIj, Inject 0.75 mLs into the skin every 7 days. (1 mg every week), Disp: 9 mL, Rfl: 4    simvastatin (ZOCOR) 40 MG tablet, Take 1 tablet (40 mg total) by mouth every evening., Disp: 90 tablet, Rfl: 3    sodium bicarbonate 325 MG tablet, Take 2 tablets (650 mg total) by mouth 2 (two) times daily., Disp: 120 tablet, Rfl: 11    topiramate (TOPAMAX) 50 MG tablet, Take 1 tablet (50 mg total) by mouth 2 (two) times daily., Disp: 180 tablet, Rfl:  0    traMADol (ULTRAM) 50 mg tablet, Take 1 tablet (50 mg total) by mouth every 8 (eight) hours as needed., Disp: 90 tablet, Rfl: 3  No current facility-administered medications for this visit.    Facility-Administered Medications Ordered in Other Visits:     fentaNYL injection 25 mcg, 25 mcg, Intravenous, Q5 Min PRN, Desmond Fontana MD, 50 mcg at 21 0955    midazolam (VERSED) 1 mg/mL injection 0.5 mg, 0.5 mg, Intravenous, PRN, Desmond Fontana MD, 2 mg at 21 0955    Past Medical History:   Diagnosis Date    Asthma     Constipation 10/3/2019    Deep vein thrombophlebitis of left leg 2012    Deep vein thrombosis     when she had a knee replacement    Encounter for blood transfusion     anemic     GERD (gastroesophageal reflux disease)     Obesity, diabetes, and hypertension syndrome 2019    Obstructive sleep apnea 2012    PAD (peripheral artery disease) 2013    Type 2 diabetes mellitus with obesity 2020    Type 2 diabetes mellitus with peripheral artery disease 2020       Past Surgical History:   Procedure Laterality Date    CATARACT EXTRACTION W/  INTRAOCULAR LENS IMPLANT Left 3/2002    left     CATARACT EXTRACTION W/  INTRAOCULAR LENS IMPLANT Right     OD dr. olivares     SECTION      COLONOSCOPY N/A 2018    Procedure: COLONOSCOPY;  Surgeon: Faizan Mac MD;  Location: [facility] ([address] [place] [address]);  Service: Endoscopy;  Laterality: N/A;    CYST REMOVAL  2011    left side of face    CYSTOSCOPY W/ RETROGRADES Left 2020    Procedure: CYSTOSCOPY, WITH RETROGRADE PYELOGRAM;  Surgeon: Noman Rivas MD;  [address];  Service: Urology;  Laterality: Left;  30min    DE QUERVAIN'S RELEASE  2021    Procedure: RELEASE, HAND, FOR DEQUERVAIN'S TENOSYNOVITIS;  Surgeon: Marky Hagen MD;  Location: [facility];  Service: Orthopedics;;    EYE SURGERY Bilateral 2012    eye implants    GANGLION CYST  EXCISION  11/13/2007    Right wrist    INJECTION OF FACET JOINT Bilateral 5/6/2021    Procedure: INJECTION, FACET JOINT--Bilateral L4-5, L5-S1;  Surgeon: Alireza Meraz Jr., MD;  Location: [facility];  Service: Pain Management;  Laterality: Bilateral;  Oral    INTERPOSITION ARTHROPLASTY OF CARPOMETACARPAL JOINTS Left 1/8/2021    Procedure: INTERPOSITION ARTHROPLASTY, CMC JOINT - left dequervains and cmc arthroplasty, arthrex;  Surgeon: Marky Hagen MD;  Location[facility];  Service: Orthopedics;  Laterality: Left;    JOINT REPLACEMENT Left     Pan Retinal Photocoagulation Bilateral     Dr. Monterroso (Proliferative Diabetic Retinopathy)    TOTAL ABDOMINAL HYSTERECTOMY  1982    TOTAL KNEE ARTHROPLASTY  2/2006    left    TRIGGER FINGER RELEASE  9/18/2009       Family History   Problem Relation Age of Onset    Diabetes Mother     Hypertension Mother     Heart disease Father     Diabetes Sister     Thyroid disease Brother     Diabetes Brother     Hypertension Brother     No Known Problems Daughter     No Known Problems Son     No Known Problems Daughter     Amblyopia Neg Hx     Blindness Neg Hx     Glaucoma Neg Hx     Macular degeneration Neg Hx     Retinal detachment Neg Hx     Strabismus Neg Hx     Colon cancer Neg Hx     Esophageal cancer Neg Hx        Social History     Socioeconomic History    Marital status:      Spouse name: Heber    Number of children: 3    Years of education: Not on file    Highest education level: Not on file   Occupational History    Occupation:    Tobacco Use    Smoking status: Never Smoker    Smokeless tobacco: Never Used   Substance and Sexual Activity    Alcohol use: No    Drug use: No    Sexual activity: Yes     Partners: Male   Other Topics Concern    Not on file   Social History Narrative    , lives with family; 3 children, 4 grandchildren     Social Determinants of Health     Financial Resource Strain:      Difficulty of Paying Living Expenses:    Food Insecurity:     Worried About Running Out of Food in the Last Year:     Ran Out of Food in the Last Year:    Transportation Needs:     Lack of Transportation (Medical):     Lack of Transportation (Non-Medical):    Physical Activity:     Days of Exercise per Week:     Minutes of Exercise per Session:    Stress: No Stress Concern Present    Feeling of Stress : Not at all   Social Connections:     Frequency of Communication with Friends and Family:     Frequency of Social Gatherings with Friends and Family:     Attends Orthodoxy Services:     Active Member of Clubs or Organizations:     Attends Club or Organization Meetings:     Marital Status:        Review of patient's allergies indicates:   Allergen Reactions    Iodinated contrast media Rash       Physical Exam:  There were no vitals filed for this visit.  General    Nursing note and vitals reviewed.  Constitutional: She is oriented to person, place, and time. She appears well-developed and well-nourished. No distress.   HENT:   Head: Normocephalic and atraumatic.   Nose: Nose normal.   Eyes: Conjunctivae and EOM are normal. Pupils are equal, round, and reactive to light. Right eye exhibits no discharge. Left eye exhibits no discharge. No scleral icterus.   Neck: No JVD present.   Cardiovascular: Intact distal pulses.    Pulmonary/Chest: Effort normal. No respiratory distress.   Abdominal: She exhibits no distension.   Neurological: She is alert and oriented to person, place, and time. Coordination normal.   Psychiatric: She has a normal mood and affect. Her behavior is normal. Judgment and thought content normal.     General Musculoskeletal Exam   Gait: normal       Right Knee Exam     Inspection   Swelling: present  Deformity: absent    Tenderness   The patient is tender to palpation of the medial joint line and lateral joint line.    Range of Motion   Extension: normal Right knee extension grade:  painful.   Flexion: normal Right knee flexion: painful.     Other   Sensation: normal    Left Knee Exam     Inspection   Swelling: present  Deformity: absent    Tenderness   The patient tender to palpation of the medial joint line and lateral joint line.    Range of Motion   Extension: normal Left knee extension grade: painful.   Flexion: normal Left knee flexion: painful.     Other   Sensation: normalBack (L-Spine & T-Spine) / Neck (C-Spine) Exam     Tenderness Right paramedian tenderness of the Lower L-Spine. Left paramedian tenderness of the Lower L-Spine.     Back (L-Spine & T-Spine) Range of Motion   Back extension: facet loading is positive and exacerabtes/reproduces the patient's typical low back pain    Back flexion: limited ROM but partial relief of low back pain noted.     Spinal Sensation   Right Side Sensation  L-Spine Level: normal  Left Side Sensation  L-Spine Level: normal    Other She has no scoliosis .      Muscle Strength   Right Lower Extremity   Hip Flexion: 5/5   Hip Extensors: 5/5  Quadriceps:  5/5   Hamstrin/5   Gastrocsoleus:  5/5   Left Lower Extremity   Hip Flexion: 5/5   Hip Extensors: 5/5  Quadriceps:  5/5   Hamstrin/5   Gastrocsoleus:  5/5     Reflexes     Left Side  Achilles:  2+  Quadriceps:  2+    Right Side   Achilles:  2+  Quadriceps:  2+      Imagin2021  X-ray Knee Ortho Right  CLINICAL HISTORY:  R KNEE; Pain in right knee  TECHNIQUE:  AP standing of both knees, Merchant views of both knees as well as a lateral view of the right knee were performed.  COMPARISON:  2018  FINDINGS:  No acute fracture seen.  No significant suprapatellar joint effusion.  Tricompartmental osteophyte formation with advanced narrowing of the medial and patellofemoral compartments.  Previous left knee arthroplasty with metallic components in good position.  Impression:  Osteoarthritis with advanced joint space narrowing.    Labs:  BMP  Lab Results   Component Value Date      03/22/2021    K 4.0 03/22/2021     03/22/2021    CO2 26 03/22/2021    BUN 30 (H) 03/22/2021    CREATININE 1.8 (H) 03/22/2021    CALCIUM 9.0 03/22/2021    ANIONGAP 8 03/22/2021    ESTGFRAFRICA 32.2 (A) 03/22/2021    EGFRNONAA 27.9 (A) 03/22/2021     Lab Results   Component Value Date    ALT 18 03/22/2021    AST 24 03/22/2021    ALKPHOS 122 03/22/2021    BILITOT 0.3 03/22/2021     Wt Readings from Last 5 Encounters:   05/17/21 (!) 142.9 kg (315 lb 0.6 oz)   05/06/21 95.3 kg (210 lb)   04/15/21 (!) 145.6 kg (320 lb 15.8 oz)   04/07/21 (!) 145.6 kg (321 lb)   03/30/21 (!) 146.5 kg (323 lb)       Assessment:  Problem List Items Addressed This Visit        Neuro    DDD (degenerative disc disease), lumbar    Chronic pain       Orthopedic    Chronic back pain      Other Visit Diagnoses     Lumbar spondylosis    -  Primary    Arthropathy of facet joints at multiple levels        Primary osteoarthritis of both knees              4/28/21 - Annika Mac is a 70 y.o. female with diffuse chronic pain related to degenerative joint disease and degenerative spine disease complicated by morbid obesity and likely sedentary lifestyle as well.  Patient is currently receiving treatment for weight reduction by the Bariatric Medicine Department and follows up with Rheumatology, along with a multitude of other providers.  She received a bilateral lumbar facet injection for low back pain in 2014 which provided her 90% relief based on postoperative assessment.  I recommended repeating this injection.  While patient was referred for knee pain, she chose to focus on low back pain during today's encounter; however, we can treat her knee pain in the future if that is something she wants to discuss.  When she was last in this clinic in 2014 she received recommendations to lose weight and establish a daily home exercise regimen, both of which remain relevant today.    5/21/2021- Annika Mac is a 70 y.o. female who  has a past medical history  of Asthma, Constipation (10/3/2019), Deep vein thrombophlebitis of left leg (7/27/2012), Deep vein thrombosis, Encounter for blood transfusion, GERD (gastroesophageal reflux disease), Obesity, diabetes, and hypertension syndrome (2/13/2019), Obstructive sleep apnea (7/27/2012), PAD (peripheral artery disease) (11/21/2013), Type 2 diabetes mellitus with obesity (8/19/2020), and Type 2 diabetes mellitus with peripheral artery disease (8/19/2020).  By history and examination this patient has chronic low back pain without  radiculopathy.  The underlying cause cause is facet arthritis, degenerative disc disease, muscle dysfunction and deconditioning.  Pathology is confirmed by imaging.  We discussed the underlying diagnoses and multiple treatment options including non-opioid medications, injections, physical therapy, home exercise, activity modification / rest and weight loss.  The risks and benefits of each treatment option were discussed and all questions were answered.  I discussed with patient I will consult Dr. Meraz to see if he would like to move forward with scheduling the bilateral lumbar RFA, patient verbalized understanding and agreed.  Until that I recommended that she continue with physical therapy as well as stabbing home exercise plan that involves daily stretching and core strengthening.    03/28/[id number]-wtkf-old morbidly obese female with a history of chronic low back pain related to facet arthropathy, DDD complicated by morbid obesity and a sedentary lifestyle.  She has benefited from facet steroid injections targeting L4-5 and L5-S1, patient today on recommending a diagnostic bilateral lumbar MBB targeting L4-5 L5-S1 and considering her for a lumbar RFA following.  The patient was amenable to this plan also provide her with information explain the procedure even further.   Lastly, we did discuss considering her for a right knee GN block and RFA to help with chronic right knee pian, she is being  considered for a right TKA with Dr. Ragland once she has los approximately 10 lbs.     04/11/[id number]-tjvo-old female with a history of chronic low back pain related to facet arthropathy, DDD complicated by morbid obesity and sedentary lifestyle.  She is status post her 1st diagnostic lumbar MBB targeting L4-5 L5-S1 with 9% relief and improved functionality.  Discussed today that will now recommend a 2nd diagnostic lumbar MBB targeting the above-mentioned levels.  If appropriate we will schedule for lumbar RFA.      : Reviewed and consistent with medication use as prescribed.    Treatment Plan:   Procedures:  Scheduled for #2 of 2  diagnostic bilateral lumbar MBB targeting L4-5 L5-S1  and RFA to follow.                Consider a right knee GNB and RFA in the future.   PT/OT/HEP:    - attend PT as scheduled- referral today   - daily cardio   - daily stretching   - focused HEP  Medications: No changes recommended at this time.  Labs: reviewed and medications are appropriately dosed for current hepatorenal function.  Imaging: No additional recommended at this time.    Follow Up:  Call patient following 2nd diagnostic lumbar MBB     ROSANGELA RayC  Interventional Pain Management    Disclaimer: This note was partly generated using dictation software which may occasionally result in transcription errors.

## 2022-04-11 NOTE — PROGRESS NOTES
"[facility] Established  Patient Evaluation    Referred by: Dr Marino Ragland  Reason for referral: Right Knee    CC:   Chief Complaint   Patient presents with    Low-back Pain    Leg Pain    Knee Pain   8/26/2014   Pain Disability Index (PDI) 34 21         Interval Updates:  4/11/2022 -  Bubba returns to clinic s/p a Diagnostic Bilateral Lumbar MBB targeting L4/5 and L5/S1 on 4/2/22 with 90% relief with improved functionality.   She reports a pain intensity 6/10 today with a weekly range of 6-7/10.     Interval Updates: 4/11/2022 - Ms. Mac is following up for No chief complaint on file.  Pain is currently rate 8/10 with a weekly range 8-9/10.  Currently her low back pain and his worst we will begin addressing this 1st.  She has seen orthopedics/Dr. Linares he wants her to lose 10 lb prior to consenting her for right TKA.  Sparse her low back pain she actually received benefit from a previous bilateral lumbar facet injection 5% we targeted the L4-5 L5-S1 facet joints.  She complains of low back pain that has continued, pain is located across her low back with mild radiation into her legs bilaterally.  She denies any recent incident, denies any profound weakness, denies any bowel bladder dysfunction, denies any saddle anesthesia at this time.     5/21/21 - Ms. Mac returns to clinic for follow up visit reporting stable low back pain.  Patient is s/p Bilateral L4-5 and L5-S1 Lumbar Facet Injection on 5/6/21 with 75% relief for a" few days and then pain returned"  Pain intensity is currently 7/10.      HPI:   Annika Mac is a 70 y.o. female with numerous problems resulting from, or exacerbated by, morbid obesity who presents complaining of chronic low back pain.  She states longstanding low back pain with a minor component of pain radiating into the back of the legs, all way down to the calf, bilaterally.  She reports rapidly increasing back pain with standing or walking for more than 5 min, requiring " her to sit and rest.  Pain decreases very quickly when resting.  In 2014, she received a bilateral facet steroid injection in the low back.  At that time, the provider documented 90% relief of pain.    Location: low back  Onset: 2-3 months ago  Current Pain Score: 7/10  Daily Pain of Range: 5-8/10  Quality: Aching, Sharp and Shooting  Radiation: Buttocks  Worsened by: daily activity  Improved by: nothing    Previous Therapies:  PT/OT: Yes, and currently restarting PT  HEP: no  Interventions:   4/5/2022 Bilateral facet injection L4-5 and L5-S1 in 2014 with 90% relief  Surgery: Denies back surgery  Medications:   - NSAIDS:   - MSK Relaxants:   - TCAs:   - SNRIs:   - Topicals:   - Anticonvulsants:  - Opioids:     Current Pain Medications:  1. Lyrica 150 mg b.i.d.  2. Cymbalta 30 mg daily    Review of Systems:  Review of Systems   Constitutional: Negative for chills and fever.   HENT: Negative for nosebleeds.    Eyes: Negative for blurred vision.   Respiratory: Negative for hemoptysis.    Cardiovascular: Negative for chest pain and palpitations.   Gastrointestinal: Negative for heartburn and vomiting.   Genitourinary: Negative for hematuria.   Musculoskeletal: Positive for back pain and joint pain. Negative for falls and myalgias.   Skin: Negative for rash.   Neurological: Negative for tingling, focal weakness, seizures and loss of consciousness.   Endo/Heme/Allergies: Does not bruise/bleed easily.   Psychiatric/Behavioral: Negative for hallucinations.       History:    Current Outpatient Medications:     albuterol (PROAIR HFA) 90 mcg/actuation inhaler, Inhale 2 puffs into the lungs every 6 (six) hours as needed for Wheezing. Rescue, Disp: 18 g, Rfl: 6    allopurinoL (ZYLOPRIM) 300 MG tablet, Take 1 tablet (300 mg total) by mouth once daily., Disp: 90 tablet, Rfl: 4    amLODIPine (NORVASC) 10 MG tablet, Take 1 tablet (10 mg total) by mouth once daily., Disp: 90 tablet, Rfl: 3    ammonium lactate 12 % Crea, Apply  "twice daily to affected parts both feet as needed., Disp: 140 g, Rfl: 11    aspirin 81 MG Chew, Take 1 tablet (81 mg total) by mouth once daily., Disp: , Rfl: 0    BD ULTRA-FINE KIKA PEN NEEDLE 32 gauge x 5/32" Ndle, To use 4 times daily, Disp: 200 each, Rfl: 20    betamethasone dipropionate (DIPROLENE) 0.05 % cream, Apply topically 2 (two) times daily. Prn hand rash, Disp: 45 g, Rfl: 0    blood sugar diagnostic Strp, 1 each by Misc.(Non-Drug; Combo Route) route 3 (three) times daily. Dx code  E11.42 . Contour Next, Disp: 200 each, Rfl: 12    blood-glucose meter kit, 1 each by Other route once daily. Use daily. Insurance proffered one touch  E11.42 (Patient taking differently: 1 each by Other route once daily. Contour next .  Insurance proffered one touch  E11.42), Disp: 1 each, Rfl: 0    blood-glucose sensor (DEXCOM G6 SENSOR) [place], Use as Directed, Disp: 2 Device, Rfl: 11    blood-glucose transmitter (DEXCOM G6 TRANSMITTER) Terese, Every 3 months. Use as Directed to check blood glucose., Disp: 1 Device, Rfl: 3    chlorthalidone (HYGROTEN) 25 MG Tab, Take 1 tablet (25 mg total) by mouth once daily., Disp: 30 tablet, Rfl: 11    colchicine (COLCRYS) 0.6 mg tablet, TAKE 1 TABLET EVERY OTHER DAY, Disp: 45 tablet, Rfl: 3    diclofenac sodium (VOLTAREN) 1 % Gel, Apply 2 g topically 4 (four) times daily., Disp: 1 Tube, Rfl: 2    DULoxetine (CYMBALTA) 30 MG capsule, Take 1 capsule (30 mg total) by mouth once daily., Disp: 90 capsule, Rfl: 3    famotidine (PEPCID) 20 MG tablet, Take 1 tablet (20 mg total) by mouth 2 (two) times daily., Disp: 1 tablet, Rfl: 0    fexofenadine (ALLEGRA) 180 MG tablet, TAKE 1 TABLET BY MOUTH ONCE DAILY, Disp: 30 tablet, Rfl: 0    glucagon (GVOKE HYPOPEN 1-PACK) 0.5 mg/0.1 mL AtIn, Inject 1 Dose into the skin daily as needed (for severe hypoglycemia if you aren't able to treat by ingesting sugar). (Patient not taking: Reported on 5/17/2021), Disp: 1 Syringe, Rfl: 3    " HYDROcodone-acetaminophen (NORCO) 7.5-325 mg per tablet, Take 1 tablet by mouth every 6 (six) hours as needed. (Patient not taking: Reported on 4/7/2021), Disp: 28 tablet, Rfl: 0    insulin NPH-insulin regular, 70/30, (NOVOLIN 70/30 U-100 INSULIN) 100 unit/mL (70-30) injection, 120 UNITS INTO THE SKIN WITH BREAKFAST, 110 UNITS WITH LUNCH, AND INJECT 90 UNITS WITH DINNER ; . (Patient taking differently: 110 UNITS INTO THE SKIN WITH BREAKFAST, 85 UNITS WITH LUNCH, AND INJECT 65 UNITS WITH DINNER ; .), Disp: 30 mL, Rfl: 3    irbesartan (AVAPRO) 300 MG tablet, Take 1 tablet (300 mg total) by mouth every evening., Disp: 90 tablet, Rfl: 3    ketoconazole (NIZORAL) 2 % cream, Apply topically once daily., Disp: 1 Tube, Rfl: 2    lancets 31 gauge Misc, 1 lancet by Misc.(Non-Drug; Combo Route) route 4 (four) times daily. E11.42 . Prescribed one touch, Disp: 200 each, Rfl: 6    LIDOcaine HCL 2% (XYLOCAINE) 2 % jelly, Apply topically as needed. Apply topically once nightly to affected part of foot/feet. (Patient not taking: Reported on 5/17/2021), Disp: 30 mL, Rfl: 2    methylPREDNISolone (MEDROL DOSEPACK) 4 mg tablet, use as directed (Patient not taking: Reported on 5/17/2021), Disp: 1 Package, Rfl: 0    multivitamin (THERAGRAN) per tablet, Take 1 tablet by mouth once daily., Disp: , Rfl:     pregabalin (LYRICA) 150 MG capsule, Take 1 capsule (150 mg total) by mouth 2 (two) times daily., Disp: 180 capsule, Rfl: 3    semaglutide (OZEMPIC) 1 mg/dose (2 mg/1.5 mL) PnIj, Inject 0.75 mLs into the skin every 7 days. (1 mg every week), Disp: 9 mL, Rfl: 4    simvastatin (ZOCOR) 40 MG tablet, Take 1 tablet (40 mg total) by mouth every evening., Disp: 90 tablet, Rfl: 3    sodium bicarbonate 325 MG tablet, Take 2 tablets (650 mg total) by mouth 2 (two) times daily., Disp: 120 tablet, Rfl: 11    topiramate (TOPAMAX) 50 MG tablet, Take 1 tablet (50 mg total) by mouth 2 (two) times daily., Disp: 180 tablet, Rfl:  0    traMADol (ULTRAM) 50 mg tablet, Take 1 tablet (50 mg total) by mouth every 8 (eight) hours as needed., Disp: 90 tablet, Rfl: 3  No current facility-administered medications for this visit.    Facility-Administered Medications Ordered in Other Visits:     fentaNYL injection 25 mcg, 25 mcg, Intravenous, Q5 Min PRN, Desmond Fontana MD, 50 mcg at 21 0955    midazolam (VERSED) 1 mg/mL injection 0.5 mg, 0.5 mg, Intravenous, PRN, Desmond Fontana MD, 2 mg at 21 0955    Past Medical History:   Diagnosis Date    Asthma     Constipation 10/3/2019    Deep vein thrombophlebitis of left leg 2012    Deep vein thrombosis     when she had a knee replacement    Encounter for blood transfusion     anemic     GERD (gastroesophageal reflux disease)     Obesity, diabetes, and hypertension syndrome 2019    Obstructive sleep apnea 2012    PAD (peripheral artery disease) 2013    Type 2 diabetes mellitus with obesity 2020    Type 2 diabetes mellitus with peripheral artery disease 2020       Past Surgical History:   Procedure Laterality Date    CATARACT EXTRACTION W/  INTRAOCULAR LENS IMPLANT Left 3/2002    left     CATARACT EXTRACTION W/  INTRAOCULAR LENS IMPLANT Right     OD dr. olivares     SECTION      COLONOSCOPY N/A 2018    Procedure: COLONOSCOPY;  Surgeon: Faizan Mac MD;  Location: [facility] ([address] [place] [address]);  Service: Endoscopy;  Laterality: N/A;    CYST REMOVAL  2011    left side of face    CYSTOSCOPY W/ RETROGRADES Left 2020    Procedure: CYSTOSCOPY, WITH RETROGRADE PYELOGRAM;  Surgeon: Noman Rivas MD;  [address];  Service: Urology;  Laterality: Left;  30min    DE QUERVAIN'S RELEASE  2021    Procedure: RELEASE, HAND, FOR DEQUERVAIN'S TENOSYNOVITIS;  Surgeon: Marky Hagen MD;  Location: [facility];  Service: Orthopedics;;    EYE SURGERY Bilateral 2012    eye implants    GANGLION CYST  EXCISION  11/13/2007    Right wrist    INJECTION OF FACET JOINT Bilateral 5/6/2021    Procedure: INJECTION, FACET JOINT--Bilateral L4-5, L5-S1;  Surgeon: Alireza Meraz Jr., MD;  Location: [facility];  Service: Pain Management;  Laterality: Bilateral;  Oral    INTERPOSITION ARTHROPLASTY OF CARPOMETACARPAL JOINTS Left 1/8/2021    Procedure: INTERPOSITION ARTHROPLASTY, CMC JOINT - left dequervains and cmc arthroplasty, arthrex;  Surgeon: Marky Hagen MD;  Location: [facility];  Service: Orthopedics;  Laterality: Left;    JOINT REPLACEMENT Left     Pan Retinal Photocoagulation Bilateral     Dr. Monterroso (Proliferative Diabetic Retinopathy)    TOTAL ABDOMINAL HYSTERECTOMY  1982    TOTAL KNEE ARTHROPLASTY  2/2006    left    TRIGGER FINGER RELEASE  9/18/2009       Family History   Problem Relation Age of Onset    Diabetes Mother     Hypertension Mother     Heart disease Father     Diabetes Sister     Thyroid disease Brother     Diabetes Brother     Hypertension Brother     No Known Problems Daughter     No Known Problems Son     No Known Problems Daughter     Amblyopia Neg Hx     Blindness Neg Hx     Glaucoma Neg Hx     Macular degeneration Neg Hx     Retinal detachment Neg Hx     Strabismus Neg Hx     Colon cancer Neg Hx     Esophageal cancer Neg Hx        Social History     Socioeconomic History    Marital status:      Spouse name: Heber    Number of children: 3    Years of education: Not on file    Highest education level: Not on file   Occupational History    Occupation:    Tobacco Use    Smoking status: Never Smoker    Smokeless tobacco: Never Used   Substance and Sexual Activity    Alcohol use: No    Drug use: No    Sexual activity: Yes     Partners: Male   Other Topics Concern    Not on file   Social History Narrative    , lives with family; 3 children, 4 grandchildren     Social Determinants of Health     Financial Resource Strain:      Difficulty of Paying Living Expenses:    Food Insecurity:     Worried About Running Out of Food in the Last Year:     Ran Out of Food in the Last Year:    Transportation Needs:     Lack of Transportation (Medical):     Lack of Transportation (Non-Medical):    Physical Activity:     Days of Exercise per Week:     Minutes of Exercise per Session:    Stress: No Stress Concern Present    Feeling of Stress : Not at all   Social Connections:     Frequency of Communication with Friends and Family:     Frequency of Social Gatherings with Friends and Family:     Attends [employer] Services:     Active Member of Clubs or Organizations:     Attends Club or Organization Meetings:     Marital Status:        Review of patient's allergies indicates:   Allergen Reactions    Iodinated contrast media Rash       Physical Exam:  There were no vitals filed for this visit.  General    Nursing note and vitals reviewed.  Constitutional: She is oriented to person, place, and time. She appears well-developed and well-nourished. No distress.   HENT:   Head: Normocephalic and atraumatic.   Nose: Nose normal.   Eyes: Conjunctivae and EOM are normal. Pupils are equal, round, and reactive to light. Right eye exhibits no discharge. Left eye exhibits no discharge. No scleral icterus.   Neck: No JVD present.   Cardiovascular: Intact distal pulses.    Pulmonary/Chest: Effort normal. No respiratory distress.   Abdominal: She exhibits no distension.   Neurological: She is alert and oriented to person, place, and time. Coordination normal.   Psychiatric: She has a normal mood and affect. Her behavior is normal. Judgment and thought content normal.     General Musculoskeletal Exam   Gait: normal       Right Knee Exam     Inspection   Swelling: present  Deformity: absent    Tenderness   The patient is tender to palpation of the medial joint line and lateral joint line.    Range of Motion   Extension: normal Right knee extension grade:  painful.   Flexion: normal Right knee flexion: painful.     Other   Sensation: normal    Left Knee Exam     Inspection   Swelling: present  Deformity: absent    Tenderness   The patient tender to palpation of the medial joint line and lateral joint line.    Range of Motion   Extension: normal Left knee extension grade: painful.   Flexion: normal Left knee flexion: painful.     Other   Sensation: normalBack (L-Spine & T-Spine) / Neck (C-Spine) Exam     Tenderness Right paramedian tenderness of the Lower L-Spine. Left paramedian tenderness of the Lower L-Spine.     Back (L-Spine & T-Spine) Range of Motion   Back extension: facet loading is positive and exacerabtes/reproduces the patient's typical low back pain    Back flexion: limited ROM but partial relief of low back pain noted.     Spinal Sensation   Right Side Sensation  L-Spine Level: normal  Left Side Sensation  L-Spine Level: normal    Other She has no scoliosis .      Muscle Strength   Right Lower Extremity   Hip Flexion: 5/5   Hip Extensors: 5/5  Quadriceps:  5/5   Hamstrin/5   Gastrocsoleus:  5/5   Left Lower Extremity   Hip Flexion: 5/5   Hip Extensors: 5/5  Quadriceps:  5/5   Hamstrin/5   Gastrocsoleus:  5/5     Reflexes     Left Side  Achilles:  2+  Quadriceps:  2+    Right Side   Achilles:  2+  Quadriceps:  2+      Imagin2021  X-ray Knee Ortho Right  CLINICAL HISTORY:  R KNEE; Pain in right knee  TECHNIQUE:  AP standing of both knees, Merchant views of both knees as well as a lateral view of the right knee were performed.  COMPARISON:  2018  FINDINGS:  No acute fracture seen.  No significant suprapatellar joint effusion.  Tricompartmental osteophyte formation with advanced narrowing of the medial and patellofemoral compartments.  Previous left knee arthroplasty with metallic components in good position.  Impression:  Osteoarthritis with advanced joint space narrowing.    Labs:  BMP  Lab Results   Component Value Date      03/22/2021    K 4.0 03/22/2021     03/22/2021    CO2 26 03/22/2021    BUN 30 (H) 03/22/2021    CREATININE 1.8 (H) 03/22/2021    CALCIUM 9.0 03/22/2021    ANIONGAP 8 03/22/2021    ESTGFRAFRICA 32.2 (A) 03/22/2021    EGFRNONAA 27.9 (A) 03/22/2021     Lab Results   Component Value Date    ALT 18 03/22/2021    AST 24 03/22/2021    ALKPHOS 122 03/22/2021    BILITOT 0.3 03/22/2021     Wt Readings from Last 5 Encounters:   05/17/21 (!) 142.9 kg (315 lb 0.6 oz)   05/06/21 95.3 kg (210 lb)   04/15/21 (!) 145.6 kg (320 lb 15.8 oz)   04/07/21 (!) 145.6 kg (321 lb)   03/30/21 (!) 146.5 kg (323 lb)       Assessment:  Problem List Items Addressed This Visit        Neuro    DDD (degenerative disc disease), lumbar    Chronic pain       Orthopedic    Chronic back pain      Other Visit Diagnoses     Lumbar spondylosis    -  Primary    Arthropathy of facet joints at multiple levels        Primary osteoarthritis of both knees              4/28/21 - Annika Mac is a 70 y.o. female with diffuse chronic pain related to degenerative joint disease and degenerative spine disease complicated by morbid obesity and likely sedentary lifestyle as well.  Patient is currently receiving treatment for weight reduction by the Bariatric Medicine Department and follows up with Rheumatology, along with a multitude of other providers.  She received a bilateral lumbar facet injection for low back pain in 2014 which provided her 90% relief based on postoperative assessment.  I recommended repeating this injection.  While patient was referred for knee pain, she chose to focus on low back pain during today's encounter; however, we can treat her knee pain in the future if that is something she wants to discuss.  When she was last in this clinic in 2014 she received recommendations to lose weight and establish a daily home exercise regimen, both of which remain relevant today.    5/21/2021- Annika Mac is a 70 y.o. female who  has a past medical history  of Asthma, Constipation (10/3/2019), Deep vein thrombophlebitis of left leg (7/27/2012), Deep vein thrombosis, Encounter for blood transfusion, GERD (gastroesophageal reflux disease), Obesity, diabetes, and hypertension syndrome (2/13/2019), Obstructive sleep apnea (7/27/2012), PAD (peripheral artery disease) (11/21/2013), Type 2 diabetes mellitus with obesity (8/19/2020), and Type 2 diabetes mellitus with peripheral artery disease (8/19/2020).  By history and examination this patient has chronic low back pain without  radiculopathy.  The underlying cause cause is facet arthritis, degenerative disc disease, muscle dysfunction and deconditioning.  Pathology is confirmed by imaging.  We discussed the underlying diagnoses and multiple treatment options including non-opioid medications, injections, physical therapy, home exercise, activity modification / rest and weight loss.  The risks and benefits of each treatment option were discussed and all questions were answered.  I discussed with patient I will consult Dr. Meraz to see if he would like to move forward with scheduling the bilateral lumbar RFA, patient verbalized understanding and agreed.  Until that I recommended that she continue with physical therapy as well as stabbing home exercise plan that involves daily stretching and core strengthening.    03/28/[id number]-amnp-old morbidly obese female with a history of chronic low back pain related to facet arthropathy, DDD complicated by morbid obesity and a sedentary lifestyle.  She has benefited from facet steroid injections targeting L4-5 and L5-S1, patient today on recommending a diagnostic bilateral lumbar MBB targeting L4-5 L5-S1 and considering her for a lumbar RFA following.  The patient was amenable to this plan also provide her with information explain the procedure even further.   Lastly, we did discuss considering her for a right knee GN block and RFA to help with chronic right knee pian, she is being  considered for a right TKA with Dr. Ragland once she has los approximately 10 lbs.     04/11/20220634-15-nhld-old female with a history of chronic low back pain related to facet arthropathy, DDD complicated by morbid obesity and sedentary lifestyle.  She is status post her 1st diagnostic lumbar MBB targeting L4-5 L5-S1 with 9% relief and improved functionality.  Discussed today that will now recommend a 2nd diagnostic lumbar MBB targeting the above-mentioned levels.  If appropriate we will schedule for lumbar RFA.      : Reviewed and consistent with medication use as prescribed.    Treatment Plan:   Procedures:  Scheduled for #2 of 2  diagnostic bilateral lumbar MBB targeting L4-5 L5-S1  and RFA to follow.                Consider a right knee GNB and RFA in the future.   PT/OT/HEP:    - attend PT as scheduled- referral today   - daily cardio   - daily stretching   - focused HEP  Medications: No changes recommended at this time.  Labs: reviewed and medications are appropriately dosed for current hepatorenal function.  Imaging: No additional recommended at this time.    Follow Up:  Call patient following 2nd diagnostic lumbar MBB     ROSANGELA RayC  Interventional Pain Management    Disclaimer: This note was partly generated using dictation software which may occasionally result in transcription errors.

## 2022-04-12 ENCOUNTER — CLINICAL SUPPORT (OUTPATIENT)
Dept: REHABILITATION | Facility: HOSPITAL | Age: 72
End: 2022-04-12
Payer: MEDICARE

## 2022-04-12 DIAGNOSIS — M51.36 DDD (DEGENERATIVE DISC DISEASE), LUMBAR: ICD-10-CM

## 2022-04-12 DIAGNOSIS — M47.816 LUMBAR FACET ARTHROPATHY: ICD-10-CM

## 2022-04-12 DIAGNOSIS — M53.86 DECREASED RANGE OF MOTION OF LUMBAR SPINE: ICD-10-CM

## 2022-04-12 DIAGNOSIS — M47.816 LUMBAR SPONDYLOSIS: ICD-10-CM

## 2022-04-12 PROBLEM — R29.898 DECREASED STRENGTH OF LOWER EXTREMITY: Status: ACTIVE | Noted: 2021-04-08

## 2022-04-12 LAB
ALBUMIN SERPL BCP-MCNC: 3.7 G/DL (ref 3.5–5.2)
ALP SERPL-CCNC: 139 U/L (ref 55–135)
ALT SERPL W/O P-5'-P-CCNC: 23 U/L (ref 10–44)
ANION GAP SERPL CALC-SCNC: 10 MMOL/L (ref 8–16)
AST SERPL-CCNC: 28 U/L (ref 10–40)
BILIRUB SERPL-MCNC: 0.3 MG/DL (ref 0.1–1)
BUN SERPL-MCNC: 30 MG/DL (ref 8–23)
CALCIUM SERPL-MCNC: 10 MG/DL (ref 8.7–10.5)
CHLORIDE SERPL-SCNC: 102 MMOL/L (ref 95–110)
CO2 SERPL-SCNC: 27 MMOL/L (ref 23–29)
CREAT SERPL-MCNC: 1.6 MG/DL (ref 0.5–1.4)
EST. GFR  (AFRICAN AMERICAN): 36.8 ML/MIN/1.73 M^2
EST. GFR  (NON AFRICAN AMERICAN): 32 ML/MIN/1.73 M^2
ESTIMATED AVG GLUCOSE: 186 MG/DL (ref 68–131)
GLUCOSE SERPL-MCNC: 280 MG/DL (ref 70–110)
HBA1C MFR BLD: 8.1 % (ref 4–5.6)
POTASSIUM SERPL-SCNC: 5 MMOL/L (ref 3.5–5.1)
PROT SERPL-MCNC: 6.1 G/DL (ref 6–8.4)
SODIUM SERPL-SCNC: 139 MMOL/L (ref 136–145)

## 2022-04-12 PROCEDURE — 97162 PT EVAL MOD COMPLEX 30 MIN: CPT | Mod: PN

## 2022-04-14 NOTE — DISCHARGE INSTRUCTIONS
Home Care Instructions Pain Management:    1.  DIET:    You may resume your normal diet today.    2.  BATHING:    You may shower with luke warm water.    3.  DRESSING:    You may remove your bandage today.    4.  ACTIVITY LEVEL:      You may resume your normal activities 24 hours after your procedure.    5.  MEDICATIONS:    You may resume your normal medications today.    6.  SPECIAL INSTRUCTIONS:    No heat to the injection site for 24 hours including bath or shower, heating pad, moist heat or hot tubs.    Use an ice pack to the injection site for any pain or discomfort.  Apply ice packs for 20 minute intervals as needed.    If you have received any sedatives by mouth today, you can not drive for 12 hours.    If you have received sedation through an IV, you can not drive for 24 hours.    PLEASE CALL YOUR DOCTOR FOR THE FOLLOWIN.  Redness or swelling around the injection site.  2.  Fever of 101 degrees.  3.  Drainage (pus) from the injection site.  4.  For any continuous bleeding (some dried blood over the incision is normal.)    FOR EMERGENCIES:    If any unusual problems or difficulties occur during clinic hours, call [phone] or dial [phone].    Follow up with with your physician in 2-3 weeks.

## 2022-04-16 RX ORDER — ALLOPURINOL 100 MG/1
100 TABLET ORAL DAILY
Qty: 30 TABLET | Refills: 3 | Status: SHIPPED | OUTPATIENT
Start: 2022-04-16 | End: 2022-05-16

## 2022-04-18 ENCOUNTER — OFFICE VISIT (OUTPATIENT)
Dept: ENDOCRINOLOGY | Facility: CLINIC | Age: 72
End: 2022-04-18
Payer: MEDICARE

## 2022-04-18 ENCOUNTER — TELEPHONE (OUTPATIENT)
Dept: PAIN MEDICINE | Facility: CLINIC | Age: 72
End: 2022-04-18
Payer: MEDICARE

## 2022-04-18 ENCOUNTER — CLINICAL SUPPORT (OUTPATIENT)
Dept: DIABETES | Facility: CLINIC | Age: 72
End: 2022-04-18
Payer: MEDICARE

## 2022-04-18 VITALS
SYSTOLIC BLOOD PRESSURE: 148 MMHG | HEIGHT: 64 IN | WEIGHT: 292.69 LBS | OXYGEN SATURATION: 99 % | BODY MASS INDEX: 49.97 KG/M2 | HEART RATE: 69 BPM | DIASTOLIC BLOOD PRESSURE: 78 MMHG

## 2022-04-18 VITALS — BODY MASS INDEX: 50.22 KG/M2 | WEIGHT: 292.56 LBS

## 2022-04-18 DIAGNOSIS — Z79.4 TYPE 2 DIABETES MELLITUS WITH DIABETIC POLYNEUROPATHY, WITH LONG-TERM CURRENT USE OF INSULIN: Primary | ICD-10-CM

## 2022-04-18 DIAGNOSIS — L97.522 ULCER OF LEFT FOOT, WITH FAT LAYER EXPOSED: ICD-10-CM

## 2022-04-18 DIAGNOSIS — E11.22 TYPE 2 DM WITH CKD STAGE 4 AND HYPERTENSION: Chronic | ICD-10-CM

## 2022-04-18 DIAGNOSIS — E11.42 TYPE 2 DIABETES MELLITUS WITH DIABETIC POLYNEUROPATHY, WITH LONG-TERM CURRENT USE OF INSULIN: Primary | ICD-10-CM

## 2022-04-18 DIAGNOSIS — N18.4 TYPE 2 DM WITH CKD STAGE 4 AND HYPERTENSION: Chronic | ICD-10-CM

## 2022-04-18 DIAGNOSIS — Z79.4 TYPE 2 DIABETES MELLITUS WITH DIABETIC POLYNEUROPATHY, WITH LONG-TERM CURRENT USE OF INSULIN: ICD-10-CM

## 2022-04-18 DIAGNOSIS — E11.22 TYPE 2 DM WITH CKD STAGE 4 AND HYPERTENSION: Primary | ICD-10-CM

## 2022-04-18 DIAGNOSIS — E11.42 TYPE 2 DIABETES MELLITUS WITH DIABETIC POLYNEUROPATHY, WITH LONG-TERM CURRENT USE OF INSULIN: ICD-10-CM

## 2022-04-18 DIAGNOSIS — I12.9 TYPE 2 DM WITH CKD STAGE 4 AND HYPERTENSION: Chronic | ICD-10-CM

## 2022-04-18 DIAGNOSIS — N18.4 TYPE 2 DM WITH CKD STAGE 4 AND HYPERTENSION: Primary | ICD-10-CM

## 2022-04-18 DIAGNOSIS — N18.32 ANEMIA IN STAGE 3B CHRONIC KIDNEY DISEASE: ICD-10-CM

## 2022-04-18 DIAGNOSIS — D63.1 ANEMIA IN STAGE 3B CHRONIC KIDNEY DISEASE: ICD-10-CM

## 2022-04-18 DIAGNOSIS — I12.9 TYPE 2 DM WITH CKD STAGE 4 AND HYPERTENSION: Primary | ICD-10-CM

## 2022-04-18 DIAGNOSIS — N06.9 ISOLATED PROTEINURIA WITH MORPHOLOGIC LESION: ICD-10-CM

## 2022-04-18 PROCEDURE — 3077F SYST BP >= 140 MM HG: CPT | Mod: CPTII,S$GLB,, | Performed by: INTERNAL MEDICINE

## 2022-04-18 PROCEDURE — 3288F PR FALLS RISK ASSESSMENT DOCUMENTED: ICD-10-PCS | Mod: CPTII,S$GLB,, | Performed by: INTERNAL MEDICINE

## 2022-04-18 PROCEDURE — 1159F MED LIST DOCD IN RCRD: CPT | Mod: CPTII,S$GLB,, | Performed by: INTERNAL MEDICINE

## 2022-04-18 PROCEDURE — 3052F HG A1C>EQUAL 8.0%<EQUAL 9.0%: CPT | Mod: CPTII,S$GLB,, | Performed by: INTERNAL MEDICINE

## 2022-04-18 PROCEDURE — 1126F AMNT PAIN NOTED NONE PRSNT: CPT | Mod: CPTII,S$GLB,, | Performed by: INTERNAL MEDICINE

## 2022-04-18 PROCEDURE — 4010F ACE/ARB THERAPY RXD/TAKEN: CPT | Mod: CPTII,S$GLB,, | Performed by: INTERNAL MEDICINE

## 2022-04-18 PROCEDURE — 3052F PR MOST RECENT HEMOGLOBIN A1C LEVEL 8.0 - < 9.0%: ICD-10-PCS | Mod: CPTII,S$GLB,, | Performed by: INTERNAL MEDICINE

## 2022-04-18 PROCEDURE — 1159F PR MEDICATION LIST DOCUMENTED IN MEDICAL RECORD: ICD-10-PCS | Mod: CPTII,S$GLB,, | Performed by: INTERNAL MEDICINE

## 2022-04-18 PROCEDURE — G0108 DIAB MANAGE TRN  PER INDIV: HCPCS | Mod: S$GLB,,,

## 2022-04-18 PROCEDURE — 3066F NEPHROPATHY DOC TX: CPT | Mod: CPTII,S$GLB,, | Performed by: INTERNAL MEDICINE

## 2022-04-18 PROCEDURE — 3078F DIAST BP <80 MM HG: CPT | Mod: CPTII,S$GLB,, | Performed by: INTERNAL MEDICINE

## 2022-04-18 PROCEDURE — 99999 PR PBB SHADOW E&M-EST. PATIENT-LVL I: ICD-10-PCS | Mod: PBBFAC,,,

## 2022-04-18 PROCEDURE — 3077F PR MOST RECENT SYSTOLIC BLOOD PRESSURE >= 140 MM HG: ICD-10-PCS | Mod: CPTII,S$GLB,, | Performed by: INTERNAL MEDICINE

## 2022-04-18 PROCEDURE — 3060F PR POS MICROALBUMINURIA RESULT DOCUMENTED/REVIEW: ICD-10-PCS | Mod: CPTII,S$GLB,, | Performed by: INTERNAL MEDICINE

## 2022-04-18 PROCEDURE — G0108 PR DIAB MANAGE TRN  PER INDIV: ICD-10-PCS | Mod: S$GLB,,,

## 2022-04-18 PROCEDURE — 3008F BODY MASS INDEX DOCD: CPT | Mod: CPTII,S$GLB,, | Performed by: INTERNAL MEDICINE

## 2022-04-18 PROCEDURE — 3078F PR MOST RECENT DIASTOLIC BLOOD PRESSURE < 80 MM HG: ICD-10-PCS | Mod: CPTII,S$GLB,, | Performed by: INTERNAL MEDICINE

## 2022-04-18 PROCEDURE — 3060F POS MICROALBUMINURIA REV: CPT | Mod: CPTII,S$GLB,, | Performed by: INTERNAL MEDICINE

## 2022-04-18 PROCEDURE — 3288F FALL RISK ASSESSMENT DOCD: CPT | Mod: CPTII,S$GLB,, | Performed by: INTERNAL MEDICINE

## 2022-04-18 PROCEDURE — 99999 PR PBB SHADOW E&M-EST. PATIENT-LVL V: CPT | Mod: PBBFAC,,, | Performed by: INTERNAL MEDICINE

## 2022-04-18 PROCEDURE — 3008F PR BODY MASS INDEX (BMI) DOCUMENTED: ICD-10-PCS | Mod: CPTII,S$GLB,, | Performed by: INTERNAL MEDICINE

## 2022-04-18 PROCEDURE — 1101F PT FALLS ASSESS-DOCD LE1/YR: CPT | Mod: CPTII,S$GLB,, | Performed by: INTERNAL MEDICINE

## 2022-04-18 PROCEDURE — 99214 PR OFFICE/OUTPT VISIT, EST, LEVL IV, 30-39 MIN: ICD-10-PCS | Mod: S$GLB,,, | Performed by: INTERNAL MEDICINE

## 2022-04-18 PROCEDURE — 99999 PR PBB SHADOW E&M-EST. PATIENT-LVL I: CPT | Mod: PBBFAC,,,

## 2022-04-18 PROCEDURE — 1101F PR PT FALLS ASSESS DOC 0-1 FALLS W/OUT INJ PAST YR: ICD-10-PCS | Mod: CPTII,S$GLB,, | Performed by: INTERNAL MEDICINE

## 2022-04-18 PROCEDURE — 4010F PR ACE/ARB THEARPY RXD/TAKEN: ICD-10-PCS | Mod: CPTII,S$GLB,, | Performed by: INTERNAL MEDICINE

## 2022-04-18 PROCEDURE — 99999 PR PBB SHADOW E&M-EST. PATIENT-LVL V: ICD-10-PCS | Mod: PBBFAC,,, | Performed by: INTERNAL MEDICINE

## 2022-04-18 PROCEDURE — 1126F PR PAIN SEVERITY QUANTIFIED, NO PAIN PRESENT: ICD-10-PCS | Mod: CPTII,S$GLB,, | Performed by: INTERNAL MEDICINE

## 2022-04-18 PROCEDURE — 3066F PR DOCUMENTATION OF TREATMENT FOR NEPHROPATHY: ICD-10-PCS | Mod: CPTII,S$GLB,, | Performed by: INTERNAL MEDICINE

## 2022-04-18 PROCEDURE — 99214 OFFICE O/P EST MOD 30 MIN: CPT | Mod: S$GLB,,, | Performed by: INTERNAL MEDICINE

## 2022-04-18 RX ORDER — INSULIN DEGLUDEC 100 U/ML
35 INJECTION, SOLUTION SUBCUTANEOUS DAILY
Qty: 15 PEN | Refills: 3 | Status: SHIPPED | OUTPATIENT
Start: 2022-04-18 | End: 2022-08-31

## 2022-04-18 RX ORDER — INSULIN LISPRO 100 [IU]/ML
11 INJECTION, SOLUTION INTRAVENOUS; SUBCUTANEOUS
Qty: 30 ML | Refills: 3 | Status: SHIPPED | OUTPATIENT
Start: 2022-04-18 | End: 2022-08-02

## 2022-04-18 NOTE — PROGRESS NOTES
Diabetes Care Specialist Progress Note  Author: Maria G Lopez RN, CDE  Date: 4/18/2022    Program Intake  Reason for Diabetes Program Visit:: Intervention  Type of Intervention:: Individual  Individual: Education  Education: Self-Management Skill Review, Nutrition and Meal Planning  Current diabetes risk level:: moderate  In the last 12 months, have you:: none    Lab Results   Component Value Date    HGBA1C 8.1 (H) 04/11/2022       Current Insulin Doses:  Tresiba 35 units, Humalog 10/11/11 ac meals plus sliding scale.  Adjusted today by MD.     Clinical  Problem Review  Reviewed Problem List with Patient: yes  Active comorbidities affecting diabetes self-care.: yes  Comorbidities: Hypertension, Chronic Kidney Disease, Other (comment) (Difficulty with ambulation)  Reviewed health maintenance: yes    Clinical Assessment  Have you ever experienced hypoglycemia (low blood sugar)?: yes  In the last month, how often have you experienced low blood sugar?: once a day  What symptoms do you experience?: Dizzy/Light-headed, Shaky  Have you ever been hospitalized because your blood sugar was too low?: yes (see comments) (Was treated at hospital in April for low bg)  Have you ever experienced hyperglycemia (high blood sugar)?: yes  In the last month, how often have you experienced high blood sugar?: more than once a day       Nutritional Status  Meal Plan 24 Hour Recall: Breakfast, Lunch, Snack  Meal Plan 24 Hour Recall - Breakfast: yesterday; waffles, eggs, turkey phan  Meal Plan 24 Hour Recall - Lunch: cabbage and corn bread  Meal Plan 24 Hour Recall - Snack: juice, peanut butter crackers    Additional Social History    Support  Does anyone support you with your diabetes care?: yes    Access to Mass Media & Technology  Does the patient have access to any of the following devices or technologies?: Smart phone (Not compatible with Dexcom nicholas)    Health Literacy  Preferred Learning Method: Face to Face      Diabetes  Self-Management Skills Assessment    Diabetes Disease Process/Treatment Options  Patient/caregiver able to state what happens when someone has diabetes.: yes  Patient/caregiver knows what type of diabetes they have.: yes  Diabetes Type : Type II  Assessment indicates:: Adequate understanding  Area of need?: No    Nutrition/Healthy Eating  Challenges to healthy eating:: portion control  Method of carbohydrate measurement:: no method  Patient can identify foods that impact blood sugar.: yes  Patient-identified foods:: sweets  Assessment indicates:: Knowledge deficit, Instruction Needed  Area of need?: Yes    Physical Activity/Exercise  Patient's daily activity level:: sedentary  Patient formally exercises outside of work.: no  Reasons for not exercising:: physically unable to exercise currently  Patient can identify forms of physical activity.: no  Assessment indicates:: Knowledge deficit  Area of need?: Yes    Medications  Patient is able to describe current diabetes management routine.: yes  Diabetes management routine:: insulin  Patient is able to identify current diabetes medications, dosages, and appropriate timing of medications.: yes  Patient understands the purpose of the medications taken for diabetes.: yes  Patient reports problems or concerns with current medication regimen.: yes  Medication regimen problems/concerns:: concerned about side effects  Assessment indicates:: Adequate understanding  Area of need?: No    Home Blood Glucose Monitoring  Patient states that blood sugar is checked at home daily.: yes  Monitoring Method:: home glucometer  Home glucometer meter type:: Insurance preferred meter  Blood glucose logs:: yes, encouraged to keep logs, encouraged to bring logs to provider visits  Blood glucose logs reviewed today?: yes  Assessment indicates:: Adequate understanding  Area of need?: No    Acute Complications  Able to state the blood sugar range for hypoglycemia?: yes  Patient can identify  general symptoms of hypoglycemia: yes  Patient identified:: shakiness  Able to state proper treatment of hypoglycemia?: yes  Patient identified:: 5-6 pieces of hard candy, 1/2 can soda/fruit juice  Assessment indicates:: Adequate understanding  Area of need?: No    Chronic Complications  Patient can identify major chronic complications of diabetes.: yes  Stated chronic complications:: kidney disease  Patient can identify ways to prevent or delay diabetes complications.: yes  Stated ways to prevent complications:: controlling blood sugar  Assessment indicates:: Knowledge deficit  Area of need?: Yes    Psychosocial/Coping  Patient can identify ways of coping with chronic disease.: yes  Patient-stated ways of coping with chronic disease:: support from loved ones  Assessment indicates:: Adequate understanding  Area of need?: No    Assessment Summary and Plan    Based on today's diabetes care assessment, the following areas of need were identified:      Social 5/4/2021   Support No   Access to MusicIP Media/Tech Yes   Cognitive/Behavioral Health No   Health Literacy No        Clinical 2/17/2022   Medication Adherence No        Diabetes Self-Management Skills 4/18/2022   Diabetes Disease Process/Treatment Options No   Nutrition/Healthy Eating Yes, see care planning   Physical Activity/Exercise Yes,  Unable to do any physical activity due to increasing pain her lumbar area.  Scheduled for injection in the area tomorrow    Medication No   Home Blood Glucose Monitoring No, see media for blood glucose logs   Acute Complications No   Chronic Complications Yes, reviewed.    Psychosocial/Coping No          Today's interventions were provided through individual discussion, instruction, and written materials were provided.      Patient verbalized understanding of instruction and written materials.  Pt was able to return back demonstration of instructions today. Patient understood key points, needs reinforcement and further  instruction.     Diabetes Self-Management Care Plan:    Today's Diabetes Self-Management Care Plan was developed with Annika's input. Annika has agreed to work toward the following goal(s) to improve his/her overall diabetes control.      Care Plan: Diabetes Management   Updates made since 3/19/2022 12:00 AM      Problem: Medications       Goal: Patient Agrees to take Diabetes Medication(s) as prescribed.    Start Date: 5/4/2021   Expected End Date: 8/31/2021   Recent Progress: On track   Priority: High   Barriers: No Barriers Identified   Note:    Dexcom download was evaluated.  She is having lows at various times of the days. Most lows are occurring overnight and in the afternoon which corresponds to her reported low she experinced a few weeks back where intervention was required. Denies any missed doses and is eating 3 meals per day.  Per HCP her insulin will be reduced to the following: Novolin 70/30 80 units in am and 40 units in pm.  She will stop giving the lunch dose.     She records her carb amounts in Dexcom, however she has been entering them incorrectly and reflects a larger amount of carbs than what she is consuming.   She was instructed on how to enter the correct amt into her Dexcom.     Update to care planning 2/17/2022:  Currently taking Tresiba 30 units in am, Humalog 10 units ac meals and Ozempic.  Blood glucose logs reviewed.  Patient doing well with her numbers see attached logs.  Not using the Dexcom.  Current A1c down to 6.6%    Update to care planning 4/18/2022:  current A1c is up at 8.1%.  this is an increase from January A1c.  Dr. Love has increased her Tresiba to 35 and Humalog 10/11/11 plus s/s.  She also has been in pain with her back which is causing her stress and discomfort.  This may also be a contributing factor to her elevated numbers. Continues with ozempic.      Problem: Healthy Eating       Goal: Eat 2-3 meals daily with 30-45g/2-3 servings of Carbohydrate per meal. Limit  snacking in between meal to 1 serving (15 grams).    Start Date: 2/17/2022   Expected End Date: 8/31/2022   Priority: Medium   Barriers: Lack of Motivation to Change   Note:    Reviewed meal planning and general nutritional counseling with regards to diabetes management. Meal habits reviewed. We concentrated on portion sizes and carb limits for each meal.  This has been a challenge for Annika in the past.  Stress the importance of making better meal choices and to measure all foods.   Encouraged increased consumption of non starchy veggies to diet.  Overall planning meals and making better choices.  Patient is motivated to make changes.     Update to care planning 4/18/2022:  eating schedule is off since she cares for grandchildren 3-5 x per week and tends to eat what is available. Suggested bringing fruit and salad with her when she is out of the house and to try to avoid snack type foods.  wants to do better and is willing to try.          Follow Up Plan     4 mth fu set up to review goals and logs.  At present time, patient was not interested in resuming Dexcom.  Advised she can let me know at next visit.     Today's care plan and follow up schedule was discussed with patient.  Annkia verbalized understanding of the care plan, goals, and agrees to follow up plan.        The patient was encouraged to communicate with his/her health care provider/physician and care team regarding his/her condition(s) and treatment.  I provided the patient with my contact information today and encouraged to contact me via phone or [facility] Patient Portal as needed.     Length of Visit   Total Time: 45 Minutes

## 2022-04-18 NOTE — PATIENT INSTRUCTIONS
Tresiba - 35 units daily  Humalog:  Breakfast - 10 units + correction  Lunch -       11 units + correction  Dinner -      11 units + correction

## 2022-04-18 NOTE — ASSESSMENT & PLAN NOTE
Urine protein had previously been normal.  Repeat urine protein was still elevated, although significantly improved.  We will continue to monitor for now.

## 2022-04-18 NOTE — ASSESSMENT & PLAN NOTE
Reviewed goals of therapy are to get the best control we can without hypoglycemia.  Options for therapy are limited by chronic kidney disease and previous hypoglycemia.         Medication changes:   · Increase Tresiba 30 > 35 units daily  · Humalog 10/11/11 units t.i.d. with meals  · Added moderate dose correction scale  · Continue Ozempic 1 mg weekly       Reviewed patient's current insulin regimen. Clarified proper insulin dose and timing in relation to meals, etc. Insulin injection sites and proper rotation instructed.      Advised frequent self blood glucose monitoring. Patient encouraged to document glucose results and bring them to every clinic visit.    Hypoglycemia precautions discussed. Instructed on precautions before driving.      Close adherence to lifestyle changes recommended.      Health maintenance topics addressed above.        Lab Results   Component Value Date    HGBA1C 8.1 (H) 04/11/2022    HGBA1C 6.6 (H) 01/07/2022    HGBA1C 7.1 (H) 11/08/2021

## 2022-04-18 NOTE — PROGRESS NOTES
FOLLOW-UP VISIT    Subjective:      Chief Complaint: Follow-up for diabetes and obesity    HPI: Annika Mac is a 71 y.o. female who is here for a follow-up evaluation for type 2 diabetes    The patient's last visit with me was on 1/21/2022.      Past Medical History:   Diagnosis Date    Asthma     Chronic obstructive pulmonary disease, unspecified COPD type 1/14/2022    Constipation 10/3/2019    Deep vein thrombophlebitis of left leg 7/27/2012    Deep vein thrombosis     when she had a knee replacement    Encounter for blood transfusion     anemic     GERD (gastroesophageal reflux disease)     Obesity, diabetes, and hypertension syndrome 2/13/2019    Obstructive sleep apnea 7/27/2012    PAD (peripheral artery disease) 11/21/2013    Pressure ulcer of toe of left foot     Type 2 diabetes mellitus with obesity 8/19/2020    Type 2 diabetes mellitus with peripheral artery disease 8/19/2020         With regards to the diabetes, obesity:    Last visit, we discontinued the 70/30 insulin and switched to Tresiba/Humalog.  She has been monitoring her blood sugars 3 times daily.  Blood sugars in the morning running in the high 100/low 200s.  Lunch and dinner blood sugar checks in the mid to high 100s.  Otherwise, she denies any complaints.  Her weight is down 3 lb since our last visit.    Diabetes history:  Diagnosed: 1987.   Started oral agents then NPH and Regular insulin.   Converted to MDI with Lantus and Novolog in 2/2006 after knee surgery.   D/c TZD due to weight gain 7/08 and added Symlin. Stopped Symlin 2/09.   Januvia added 2/10. D/C Januvia 12/10 r/t cost; resumed though pt assistance in 2016.   Converted to U500 in 7/10. Now using Humalog 70/30 due to financial constraints.  However, she recently switched insurances to people self and is currently not paying anything for her insulin or Ozempic.      Known complications:  Peripheral neuropathy  Nephropathy  Toe ulcer with osteomyelitis - currently being  seen at wound care and undergoing HBOT.  Toe is healing well.  Hypoglycemia       Current regimen:  · Tresiba 30 units daily  · Humalog 10 units TID with meals  · Ozempic 1 mg once weekly      Other agents tried:  · NPH/R  · U-500 insulin  · Glargine, detemir, aspart  · Januvia  · Symlin  · Actos    Hypoglycemia:  See above  Knows how to correct with 15 grams of carbs- juice, coke, or glucose tablets.     Denies missed doses.  Has Medicare Extra Help     Eats 3 meals daily, + snacks  Breakfast:  Grits/eggs/sausage georgie  Lunch:  Mashed potatoes, baked fish, corn, salad  Dinner:  Half cup rice, beans, baked chicken and greens.  Drinks:  Water, unsweet tea; Gatorade zero.    No formal exercise. Doing some exercises while seated.     Education - last visit:  5/4/2021    She is monitoring her blood glucose by fingerstick 3 times daily.    BG logs over the past several months were reviewed and scanned into Media    Diabetes Management Status    Statin: Taking  ACE/ARB: Taking    Screening or Prevention Patient's value Goal Complete/Controlled?   HgA1C Testing and Control   Lab Results   Component Value Date    HGBA1C 8.1 (H) 04/11/2022      Annually/Less than 8% No   Lipid profile : 01/07/2022 Annually Yes   LDL control Lab Results   Component Value Date    LDLCALC 80.0 01/07/2022    Annually/Less than 100 mg/dl  Yes   Nephropathy screening Lab Results   Component Value Date    LABMICR 123.0 04/11/2022     Lab Results   Component Value Date    PROTEINUA Negative 11/13/2019     Lab Results   Component Value Date    UTPCR Unable to calculate 11/13/2019      Annually Yes   Blood pressure BP Readings from Last 1 Encounters:   04/18/22 (!) 148/78    Less than 140/90 Yes   Dilated retinal exam : 03/25/2022 - Dr. Crook Annually Yes   Foot exam   : 04/10/2022 Annually Yes         Blood pressure at home running higher lately. 120-160/60-70      Reviewed past medical, family, social history and updated as  appropriate.        Objective:     Vitals:    04/18/22 1327   BP: (!) 148/78   Pulse: 69     BP Readings from Last 5 Encounters:   04/18/22 (!) 148/78   04/11/22 135/70   04/05/22 (!) 127/59   04/01/22 122/67   03/28/22 113/65       Physical Exam  Vitals and nursing note reviewed.   Constitutional:       General: She is not in acute distress.     Appearance: She is well-developed. She is obese.   HENT:      Head: Normocephalic and atraumatic.   Eyes:      General:         Right eye: No discharge.         Left eye: No discharge.      Conjunctiva/sclera: Conjunctivae normal.   Neck:      Thyroid: No thyromegaly.      Trachea: No tracheal deviation.   Cardiovascular:      Rate and Rhythm: Normal rate.   Pulmonary:      Effort: Pulmonary effort is normal. No respiratory distress.   Musculoskeletal:      Comments: No digital clubbing or extremity cyanosis   Neurological:      Mental Status: She is alert and oriented to person, place, and time.      Coordination: Coordination normal.   Psychiatric:         Behavior: Behavior normal.         Wt Readings from Last 50 Encounters:   04/18/22 132.7 kg (292 lb 10.6 oz)   04/11/22 132 kg (291 lb 0.1 oz)   04/05/22 132 kg (291 lb)   04/01/22 133.1 kg (293 lb 6.2 oz)   03/28/22 132.4 kg (291 lb 14.2 oz)   03/25/22 132.4 kg (291 lb 14.2 oz)   03/22/22 133.9 kg (295 lb 3.1 oz)   03/14/22 132 kg (291 lb)   03/14/22 132.3 kg (291 lb 10.7 oz)   03/14/22 132.3 kg (291 lb 10.7 oz)   03/04/22 131.3 kg (289 lb 7.4 oz)   02/17/22 133.2 kg (293 lb 10.4 oz)   01/21/22 133.8 kg (295 lb)   01/21/22 134.2 kg (295 lb 15.5 oz)   01/14/22 (!) 136.3 kg (300 lb 7.8 oz)   01/03/22 135.6 kg (299 lb)   12/22/21 136 kg (299 lb 13.2 oz)   12/22/21 (!) 136.9 kg (301 lb 13 oz)   12/18/21 (!) 138.3 kg (305 lb)   12/13/21 (!) 138.3 kg (305 lb)   12/08/21 (!) 138.4 kg (305 lb 1.9 oz)   12/03/21 (!) 138.4 kg (305 lb 1.9 oz)   11/23/21 133.8 kg (294 lb 15.6 oz)   11/15/21 133.8 kg (295 lb)   11/03/21 133.8 kg  (295 lb)   10/28/21 (!) 137.9 kg (304 lb 0.2 oz)   10/25/21 (!) 137.9 kg (304 lb)   10/13/21 (!) 138.3 kg (304 lb 14.3 oz)   09/27/21 (!) 138.3 kg (305 lb)   09/15/21 (!) 137.2 kg (302 lb 7.5 oz)   09/13/21 (!) 137.4 kg (302 lb 14.6 oz)   09/13/21 (!) 136.6 kg (301 lb 2.4 oz)   08/13/21 (!) 142.2 kg (313 lb 7.9 oz)   08/05/21 (!) 142.2 kg (313 lb 7.9 oz)   07/08/21 (!) 142.2 kg (313 lb 7.9 oz)   06/23/21 (!) 142.2 kg (313 lb 7.9 oz)   06/02/21 (!) 140.9 kg (310 lb 9.6 oz)   05/21/21 (!) 142.9 kg (315 lb 0.6 oz)   05/17/21 (!) 142.9 kg (315 lb 0.6 oz)   05/06/21 95.3 kg (210 lb)   04/15/21 (!) 145.6 kg (320 lb 15.8 oz)   04/07/21 (!) 145.6 kg (321 lb)   03/30/21 (!) 146.5 kg (323 lb)   03/25/21 (!) 146.6 kg (323 lb 3.1 oz)   03/25/21 (!) 146.6 kg (323 lb 3.1 oz)   03/22/21 (!) 142.2 kg (313 lb 7.9 oz)   03/15/21 (!) 141.8 kg (312 lb 9.6 oz)   02/24/21 (!) 142.9 kg (315 lb 0.6 oz)   02/24/21 (!) 142.9 kg (315 lb 0.6 oz)   01/15/21 (!) 142.9 kg (315 lb)       Lab Results   Component Value Date    HGBA1C 8.1 (H) 04/11/2022     Lab Results   Component Value Date    CHOL 138 01/07/2022    HDL 38 (L) 01/07/2022    LDLCALC 80.0 01/07/2022    TRIG 100 01/07/2022    CHOLHDL 27.5 01/07/2022     Lab Results   Component Value Date     04/11/2022    K 5.0 04/11/2022     04/11/2022    CO2 27 04/11/2022     (H) 04/11/2022    BUN 30 (H) 04/11/2022    CREATININE 1.6 (H) 04/11/2022    CALCIUM 10.0 04/11/2022    PROT 6.1 04/11/2022    ALBUMIN 3.7 04/11/2022    BILITOT 0.3 04/11/2022    ALKPHOS 139 (H) 04/11/2022    AST 28 04/11/2022    ALT 23 04/11/2022    ANIONGAP 10 04/11/2022    ESTGFRAFRICA 36.8 (A) 04/11/2022    EGFRNONAA 32.0 (A) 04/11/2022    TSH 1.365 09/08/2020      Lab Results   Component Value Date    MICALBCREAT 76.9 (H) 04/11/2022       Assessment/Plan:     Type 2 DM with CKD stage 4 and hypertension  Reviewed goals of therapy are to get the best control we can without hypoglycemia.  Options for therapy  are limited by chronic kidney disease and previous hypoglycemia.         Medication changes:   · Increase Tresiba 30 > 35 units daily  · Humalog 10/11/11 units t.i.d. with meals  · Added moderate dose correction scale  · Continue Ozempic 1 mg weekly       Reviewed patient's current insulin regimen. Clarified proper insulin dose and timing in relation to meals, etc. Insulin injection sites and proper rotation instructed.      Advised frequent self blood glucose monitoring. Patient encouraged to document glucose results and bring them to every clinic visit.    Hypoglycemia precautions discussed. Instructed on precautions before driving.      Close adherence to lifestyle changes recommended.      Health maintenance topics addressed above.        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 Crook    Anemia in chronic kidney disease (CKD)  This may artificially lower her A1c.    Isolated proteinuria  Urine protein had previously been normal.  Repeat urine protein was still elevated, although significantly improved.  We will continue to monitor for now.    Ulcer of left foot, with fat layer exposed  Completed IV antibiotics.  Continue follow-up with podiatry.    RTC in 4 months with lab work.

## 2022-04-19 ENCOUNTER — HOSPITAL ENCOUNTER (OUTPATIENT)
Facility: HOSPITAL | Age: 72
Discharge: HOME | End: 2022-04-19
Attending: PAIN MEDICINE | Admitting: PAIN MEDICINE
Payer: MEDICARE

## 2022-04-19 ENCOUNTER — TELEPHONE (OUTPATIENT)
Dept: SLEEP MEDICINE | Facility: CLINIC | Age: 72
End: 2022-04-19
Payer: MEDICARE

## 2022-04-19 VITALS
HEIGHT: 64 IN | WEIGHT: 292 LBS | BODY MASS INDEX: 49.85 KG/M2 | OXYGEN SATURATION: 99 % | SYSTOLIC BLOOD PRESSURE: 134 MMHG | HEART RATE: 70 BPM | DIASTOLIC BLOOD PRESSURE: 60 MMHG | RESPIRATION RATE: 15 BRPM | TEMPERATURE: 98 F

## 2022-04-19 DIAGNOSIS — M47.816 LUMBAR FACET ARTHROPATHY: Primary | ICD-10-CM

## 2022-04-19 DIAGNOSIS — G89.29 CHRONIC PAIN: ICD-10-CM

## 2022-04-19 LAB — POCT GLUCOSE: 227 MG/DL (ref 70–110)

## 2022-04-19 PROCEDURE — 64494 PR INJ DX/THER AGNT PARAVERT FACET JOINT,IMG GUIDE,LUMBAR/SAC, 2ND LEVEL: ICD-10-PCS | Mod: 50,KX,, | Performed by: PAIN MEDICINE

## 2022-04-19 PROCEDURE — A9579 GAD-BASE MR CONTRAST NOS,1ML: HCPCS | Performed by: PAIN MEDICINE

## 2022-04-19 PROCEDURE — 25500020 PHARM REV CODE 255: Performed by: PAIN MEDICINE

## 2022-04-19 PROCEDURE — 64494 INJ PARAVERT F JNT L/S 2 LEV: CPT | Mod: 50,KX,, | Performed by: PAIN MEDICINE

## 2022-04-19 PROCEDURE — 64494 INJ PARAVERT F JNT L/S 2 LEV: CPT | Mod: 50 | Performed by: PAIN MEDICINE

## 2022-04-19 PROCEDURE — 25000003 PHARM REV CODE 250: Performed by: PAIN MEDICINE

## 2022-04-19 PROCEDURE — 64493 PR INJ DX/THER AGNT PARAVERT FACET JOINT,IMG GUIDE,LUMBAR/SAC,1ST LVL: ICD-10-PCS | Mod: 50,KX,, | Performed by: PAIN MEDICINE

## 2022-04-19 PROCEDURE — 64493 INJ PARAVERT F JNT L/S 1 LEV: CPT | Mod: 50,KX,, | Performed by: PAIN MEDICINE

## 2022-04-19 PROCEDURE — 64493 INJ PARAVERT F JNT L/S 1 LEV: CPT | Mod: 50 | Performed by: PAIN MEDICINE

## 2022-04-19 RX ORDER — INDOMETHACIN 25 MG/1
CAPSULE ORAL
Status: DISCONTINUED | OUTPATIENT
Start: 2022-04-19 | End: 2022-04-19 | Stop reason: HOSPADM

## 2022-04-19 RX ORDER — LIDOCAINE HYDROCHLORIDE 10 MG/ML
INJECTION INFILTRATION; PERINEURAL
Status: DISCONTINUED | OUTPATIENT
Start: 2022-04-19 | End: 2022-04-19 | Stop reason: HOSPADM

## 2022-04-19 RX ORDER — BUPIVACAINE HYDROCHLORIDE 5 MG/ML
INJECTION, SOLUTION EPIDURAL; INTRACAUDAL
Status: DISCONTINUED | OUTPATIENT
Start: 2022-04-19 | End: 2022-04-19 | Stop reason: HOSPADM

## 2022-04-19 NOTE — OP NOTE
"Procedure Note    Procedure Date: 04/19/2022  Pre-operative diagnosis: Lumbar Spondylosis  Post-operative diagnosis: Lumbar Spondylosis  Procedure:  (1) Diagnostic Bilateral L4-5 Lumbar Medial Branch Block    (2) Diagnostic Bilateral L5-S1 Lumbar Medial Branch Block      Indication: This is a diagnostic block for the management of chronic pain related to lumbar spondylosis refractory to conservative therapy such as medications, physical therapy, and/or home exercise.    Procedure in detail:  Informed consent obtained after explaining the procedure and potential complications including local discomfort, infection, headache, temporary or permanent weakness and/or numbness of one or both legs, temporary or permanent paraplegia, heart attack and stroke. All questions were answered.      Patient was taken to the procedure room and placed in prone position.  Time out was performed.  Patient's Lumbar area was prepped and draped in a sterile manner.  AP and oblique fluoroscopy were used to identify and tirna the junctions between the superior articular processes and transverse processes at the L4 and L5 vertebral levels as well as the sacral ala on the right side.  Skin and tissues overlying the targeted points were anesthetized with Lidocaine 1% (10 mL) + sodium bicarbonate (1 mL) using a 25G 1.25" needle.  Then, under fluoroscopic guidance, a 25 G 3.5 inch or 22 G 5 inch spinal needle, was advanced through the anesthetized tissues toward the targeted points corresponding to the locations of the L3, L4, and L5 medial branch nerves which innervate the L4-5 and L5-S1 facets.     After heme-negative aspiration, up to 0.2-0.4 mL of contrast was administered through each needle demonstrating local accumulation under fluoroscopy at the target.  Needle placement and contrast dispersion were consistent with a successful procedure  One mL of Lidocaine 2% was injected at each location.  The needle were then removed intact and " adhesive bandages placed over the puncture sites.      The procedure was repeated on the left side with similar fluoroscopic findings.      Estimated Blood Loss: None    Disposition: The patient tolerated the procedure without complaint and was transported to the recovery room in stable condition.    Follow-up: Return for second MBB or RFA if appropriate      Alireza Meraz Jr, MD  Interventional Pain Medicine / Anesthesiology

## 2022-04-19 NOTE — TELEPHONE ENCOUNTER
----- Message from Dorothy Rogers sent at 4/18/2022 10:57 AM CDT -----  Regarding: Phone Call  Gay Ferraro,    The patient called the office today about the pap unit. I see you put an order in for supplies but the pt returned her unit AMA. Pt will need a new f2f and sleep study to do a re-trial.     I told her I would reach out to you regarding this and I let her know she needs to call your office to schedule the face to face visit.       Thank you,    Dorothy

## 2022-04-19 NOTE — DISCHARGE SUMMARY
"[facility]  Discharge Note  Short Stay     Admit Date: 4/19/2022    Discharge Date: 4/19/2022     Attending Physician: Alireza Meraz Jr, MD    Diagnoses:  There are no hospital problems to display for this patient.    Discharged Condition: Good     Hospital Course: Patient was admitted for an outpatient interventional pain management procedure and tolerated the procedure well with no complications.     Final Diagnoses: Same as principal problem.     Disposition: Home or Self Care     Follow up/Patient Instructions:        Reconciled Medications:     Medication List      ASK your doctor about these medications    albuterol 90 mcg/actuation inhaler  Commonly known as: PROAIR HFA  Inhale 2 puffs into the lungs every 6 (six) hours as needed for Wheezing. Rescue     * allopurinoL 300 MG tablet  Commonly known as: ZYLOPRIM  Take 1 tablet (300 mg total) by mouth once daily.     * allopurinoL 100 MG tablet  Commonly known as: ZYLOPRIM  Take 1 tablet (100 mg total) by mouth once daily.     amLODIPine 10 MG tablet  Commonly known as: NORVASC  Take 1 tablet (10 mg total) by mouth once daily.     ammonium lactate 12 % Crea  Apply twice daily to affected parts both feet as needed.     aspirin 81 MG Chew  Take 1 tablet (81 mg total) by mouth once daily.     BD ULTRA-FINE KIKA PEN NEEDLE 32 gauge x 5/32" Ndle  Generic drug: pen needle, diabetic  To use 4 times daily     blood sugar diagnostic Strp  1 strip 4 times daily. Contour Next.     blood-glucose meter kit  1 each by Other route once daily. Use daily. Insurance proffered one touch  E11.42     chlorthalidone 25 MG Tab  Commonly known as: HYGROTEN  Take 1 tablet (25 mg total) by mouth once daily.     colchicine 0.6 mg tablet  Commonly known as: COLCRYS  TAKE 1 TABLET EVERY OTHER DAY     DULoxetine 30 MG capsule  Commonly known as: CYMBALTA  TAKE 1 CAPSULE BY MOUTH  ONCE DAILY     famotidine 20 MG tablet  Commonly known as: PEPCID  Take 1 tablet (20 mg total) by " mouth 2 (two) times daily.     ferrous sulfate Tab tablet  Commonly known as: FEOSOL  Take 1 tablet (1 each total) by mouth once daily.     GVOKE HYPOPEN 1-PACK 0.5 mg/0.1 mL Atin  Generic drug: glucagon  Inject 1 Dose into the skin daily as needed (for severe hypoglycemia if you aren't able to treat by ingesting sugar).     insulin lispro 100 unit/mL pen  Commonly known as: HumaLOG KwikPen Insulin  Inject 11 Units into the skin 3 (three) times daily before meals. Plus sliding scale - max TDD 63 units.     irbesartan 300 MG tablet  Commonly known as: AVAPRO  Take 1 tablet (300 mg total) by mouth every evening.     labetaloL 300 MG tablet  Commonly known as: NORMODYNE  Take 1 tablet (300 mg total) by mouth 2 (two) times daily.     lancets 31 gauge Misc  1 lancet by Misc.(Non-Drug; Combo Route) route 4 (four) times daily. E11.42 . Prescribed one touch     multivitamin per tablet  Commonly known as: THERAGRAN  Take 1 tablet by mouth once daily.     pregabalin 150 MG capsule  Commonly known as: LYRICA  Take 1 capsule (150 mg total) by mouth 2 (two) times daily.     semaglutide 1 mg/dose (4 mg/3 mL)  Commonly known as: OZEMPIC  Inject 1 mg into the skin every 7 days.     simvastatin 40 MG tablet  Commonly known as: ZOCOR  Take 1 tablet (40 mg total) by mouth every evening.     sodium bicarbonate 325 MG tablet  Take 2 tablets (650 mg total) by mouth 2 (two) times daily.     TRESIBA FLEXTOUCH U-100 100 unit/mL (3 mL) insulin pen  Generic drug: insulin degludec  Inject 35 Units into the skin once daily.         * This list has 2 medication(s) that are the same as other medications prescribed for you. Read the directions carefully, and ask your doctor or other care provider to review them with you.               Discharge Procedure Orders (must include Diet, Follow-up, Activity)   Diet Adult Regular     No driving until:   Order Comments: If you received sedation, no driving for 12 hrs     Remove dressing in 24 hours      Notify your health care provider if you experience any of the following:  temperature >100.4     Notify your health care provider if you experience any of the following:  severe uncontrolled pain     Notify your health care provider if you experience any of the following:  redness, tenderness, or signs of infection (pain, swelling, redness, odor or green/yellow discharge around incision site)     Notify your health care provider if you experience any of the following:  difficulty breathing or increased cough     Notify your health care provider if you experience any of the following:  severe persistent headache     Notify your health care provider if you experience any of the following:  increased confusion or weakness     Activity as tolerated       Alireza Meraz Jr, MD  Interventional Pain Medicine / Anesthesiology

## 2022-04-20 ENCOUNTER — DOCUMENTATION ONLY (OUTPATIENT)
Dept: REHABILITATION | Facility: HOSPITAL | Age: 72
End: 2022-04-20
Payer: MEDICARE

## 2022-04-20 ENCOUNTER — PATIENT OUTREACH (OUTPATIENT)
Dept: DIABETES | Facility: CLINIC | Age: 72
End: 2022-04-20
Payer: MEDICARE

## 2022-04-20 ENCOUNTER — TELEPHONE (OUTPATIENT)
Dept: ENDOCRINOLOGY | Facility: CLINIC | Age: 72
End: 2022-04-20
Payer: MEDICARE

## 2022-04-20 NOTE — TELEPHONE ENCOUNTER
----- Message from Miguel East sent at 4/20/2022  8:25 AM CDT -----  Regarding: missed call from Cynthia      The Pt states that she missed a call from Cynthia with diabetes mgmt and would like a call back    Ph # [phone]

## 2022-04-20 NOTE — PROGRESS NOTES
Patient was called to offer therapy appointments this week secondary to patient having no therapy appointments scheduled this week. Patient declined therapy appointment secondary to having a procedure yesterday and wants to rest this week.    Liz Robin, PT, DPT, PPCES

## 2022-04-21 ENCOUNTER — TELEPHONE (OUTPATIENT)
Dept: SLEEP MEDICINE | Facility: CLINIC | Age: 72
End: 2022-04-21
Payer: MEDICARE

## 2022-04-21 NOTE — PROGRESS NOTES
"[facility]   Physical Therapy Treatment Note     Name: Annika Mac  [facility] [place] Number: [id number]    Therapy Diagnosis:   Encounter Diagnosis   Name Primary?    Decreased range of motion of lumbar spine Yes     Physician: Evangelist Hamilton FNP    Visit Date: 4/25/2022    Physician Orders: PT Eval and Treat   Medical Diagnosis from Referral:   M47.816 (ICD-10-CM) - Lumbar spondylosis   M47.816 (ICD-10-CM) - Lumbar facet arthropathy   M51.36 (ICD-10-CM) - DDD (degenerative disc disease), lumbar   Evaluation Date: 4/12/2022  Authorization Period Expiration: 4/26/2022  Plan of Care Expiration: 7/12/2022  Visit # / Visits authorized: 1/ 12 (+eval)  PTA Visit #: 1     PN DUE: 5/12/2022    Precautions: Standard and Diabetes    Time In: 10:30am  Time Out: 11:20am  Total Billable Time: 50 minutes      SUBJECTIVE     Pt reports: Back is doing alright today but nothing to brag about. She has been doing her HEP.  She was compliant with home exercise program.  Response to previous treatment: none reported  Functional change: none reported    Pain: 7/10  Location: bilateral low back (L4-L5 level) occasional symptoms running into L LE    OBJECTIVE     Objective Measures updated at progress report unless specified.       TREATMENT   (exercises and techniques performed today are bolded)    Annika received the treatments listed below:      THERAPEUTIC EXERCISES to develop  strength, endurance, ROM, flexibility, posture and core stabilization for 35 minutes including:  Mat exercises performed with wedge and 1 pillow to elevate trunk     Bike seat 7 for 5 minutes to improve range of motion, strength and endurance (d/c due to knee pain)     Hamstring stretch 5 x 15"  Hip adduction with ball 2 x 10 x 10"  LAQ 2 x 10 #2    PPT 2 x 10 with 5" holds  LTR x10 each way  PPT marches 2 x 20   Supine hip abduction 2 x 10 GTB      MANUAL THERAPY TECHNIQUES were applied  for 15 minutes including:     MFR of L lower lumbar " paraspinals (multifidi), piriformis, hamstrings, and gastroc      NEUROMUSCULAR RE-EDUCATION ACTIVITIES to improve Balance, Coordination, Kinesthetic, Sense, Proprioception and Posture for 0 minutes.  The following were included:             PATIENT EDUCATION AND HOME EXERCISES     Home Exercises Provided and Patient Education Provided     Education provided:   - educated pt that mild soreness is an expected outcome to therapy session  - educated pt to continue with HEP as issued and prescribed    Written Home Exercises Provided: Patient instructed to cont prior HEP. Exercises were reviewed and Annika was able to demonstrate them prior to the end of the session.  Annika demonstrated good  understanding of the education provided. See EMR under Patient Instructions for exercises provided during therapy sessions    ASSESSMENT     Pt presents today reporting 7-8/10 low back pain. Attempted bike today but discontinued due to increase in R knee pain, pt reports she is near to replacing her R knee. Initiated exercises today for increased core and hip stability along with improving range of motion into lumbar flexion. Pt tolerated exercises well today and demo'd good knowledge of the exercises provided on her HEP. Noted high tenderness in L lumbar multifidi around L4-L5 levels which eased slightly with manual therapy, noted increased tone in piriformis as well which improved with manual therapy. Will progress and work on standing posture and tolerance as able and tolerated.    Annika Is progressing well towards her goals.   Pt prognosis is Fair.     Pt will continue to benefit from skilled outpatient physical therapy to address the deficits listed in the problem list box on initial evaluation, provide pt/family education and to maximize pt's level of independence in the home and community environment.     Pt's spiritual, cultural and educational needs considered and pt agreeable to plan of care and goals.     Anticipated barriers  to physical therapy: chronic pain and co-morbidities     Goals:   Short Term Goals:  4 weeks   1. Pain: Pt will demonstrate improved pain by reports of less than or equal to 5/10 worst pain on the verbal rating scale in order to progress toward maximal functional ability and improve QOL. PC   2.  HEP: Patient will demonstrate independence with HEP in order to progress toward functional independence. PC      Long Term Goals:  8 weeks   1. Function: Patient will demonstrate improved function as indicated by a functional limitation score of 37% on the FOTO. PC   2. Mobility: Patient will present with full lumbar active range of motion in order to return to maximal functional potential and improve quality of life. PC   3. Strength: Patient will improve strength to 5/5 in bilateral lower extremities in order to improve functional independence and quality of life. PC   4. Patient will return to normal ADL's, community involvement, and recreational activities with no pain and maximal function. PC      Goals Key:  PC= progressing/continue;        PM= partially met;             DC= discontinue        PLAN     Continue PT per POC, assess tolerance to today's session,  progress exercise as tolerated and appropriate      Elijah Latif PTA   4/25/2022

## 2022-04-21 NOTE — TELEPHONE ENCOUNTER
----- Message from Dorothynavneet Rogers sent at 4/21/2022  9:50 AM CDT -----  Regarding: RE: had requal study 9/2021  We will have the request sent to [facility] for auth for the re-trial based on the study and order Dr. Casillas wrote.       ----- Message -----  From: Tammie Ferraro NP  Sent: 4/21/2022   7:43 AM CDT  To: Dorothy Ken  Subject: had requal study 9/2021                          Looks like she had requal psg (ord'd by arcelia) in 9/2021. Is this sufficient? Or does she need new order for machine now since the study. She states she never returned machine and has one at home she's using nightly now since I recently saw her. Was my first time seeing her so I am confused.     ----- Message -----  From: Mary Guzman MA  Sent: 4/20/2022   9:16 AM CDT  To: Tammie Ferraro NP    Spoke to the pt and she stated that the [facility] told her that she needs another sleep study done. Please advise   ----- Message -----  From: Sammie Newman  Sent: 4/20/2022   8:38 AM CDT  To: Ronan SCOTT Staff    Who called?:PT      What is the request in detail:PT would like to make an appointment as well as order a CPAP Machine. Please advise        Can the clinic reply by [facility]No        Best Call Back Number: [phone]

## 2022-04-25 ENCOUNTER — CLINICAL SUPPORT (OUTPATIENT)
Dept: REHABILITATION | Facility: HOSPITAL | Age: 72
End: 2022-04-25
Payer: MEDICARE

## 2022-04-25 DIAGNOSIS — M53.86 DECREASED RANGE OF MOTION OF LUMBAR SPINE: Primary | ICD-10-CM

## 2022-04-25 PROCEDURE — 97110 THERAPEUTIC EXERCISES: CPT | Mod: PN,CQ

## 2022-04-25 PROCEDURE — 97140 MANUAL THERAPY 1/> REGIONS: CPT | Mod: PN,CQ

## 2022-04-26 ENCOUNTER — TELEPHONE (OUTPATIENT)
Dept: PAIN MEDICINE | Facility: CLINIC | Age: 72
End: 2022-04-26
Payer: MEDICARE

## 2022-04-26 DIAGNOSIS — M54.16 LUMBAR RADICULOPATHY: Primary | ICD-10-CM

## 2022-04-26 NOTE — TELEPHONE ENCOUNTER
Medial Branch Follow Up Phone Call    Procedure: bilateral lumbar mbb  Procedure Date: 4/19/22    The patient was contacted via telephone after the medial branch block to assess post-operative pain scores.  The following was reported.    Pre-Procedure Pain Score: 7/10  Pain Score 30 minutes after injection: 10/10  Lowest Pain Score after injection: 8/10  How long did the relief last: 2 hours  Current Pain Score: 4/10    Overall % relief: 70-80%    Notes:

## 2022-04-26 NOTE — TELEPHONE ENCOUNTER
----- Message from Mellissa Flaherty sent at 4/26/2022 10:20 AM CDT -----  Contact: patient/  [phone]  Patient calling to speak with you regarding her pain management plan of care   Please advise

## 2022-04-26 NOTE — TELEPHONE ENCOUNTER
Patient is reporting 80% relief of chronic axial low back pain following the second of two diagnostic medial branch blocks.  This exceeds the target threshold of 80% relief for both diagnostic medial branch blocks, therefore we will offer Radiofrequency Ablation at the same levels starting with the right side, then follow with the left side 1 week later.    Alireza Meraz Jr, MD  Interventional Pain Medicine / Anesthesiology

## 2022-04-27 ENCOUNTER — CLINICAL SUPPORT (OUTPATIENT)
Dept: REHABILITATION | Facility: HOSPITAL | Age: 72
End: 2022-04-27
Payer: MEDICARE

## 2022-04-27 DIAGNOSIS — M53.86 DECREASED RANGE OF MOTION OF LUMBAR SPINE: Primary | ICD-10-CM

## 2022-04-27 PROCEDURE — 97140 MANUAL THERAPY 1/> REGIONS: CPT | Mod: PN,CQ

## 2022-04-27 PROCEDURE — 97110 THERAPEUTIC EXERCISES: CPT | Mod: PN,CQ

## 2022-04-27 NOTE — PROGRESS NOTES
"[facility]   Physical Therapy Treatment Note     Name: Annika Mac  [facility] Number: [id number]    Therapy Diagnosis:   Encounter Diagnosis   Name Primary?    Decreased range of motion of lumbar spine Yes     Physician: Evangelist Hamilton FNP    Visit Date: 4/27/2022    Physician Orders: PT Eval and Treat   Medical Diagnosis from Referral:   M47.816 (ICD-10-CM) - Lumbar spondylosis   M47.816 (ICD-10-CM) - Lumbar facet arthropathy   M51.36 (ICD-10-CM) - DDD (degenerative disc disease), lumbar   Evaluation Date: 4/12/2022  Authorization Period Expiration: 4/26/2022  Plan of Care Expiration: 7/12/2022  Visit # / Visits authorized: 1/ 12 (+eval)  PTA Visit #: 1     PN DUE: 5/12/2022    Precautions: Standard and Diabetes    Time In: 9:15am  Time Out: 10:00am  Total Billable Time: 45 minutes      SUBJECTIVE     Pt reports: Very sore after last time. Back is feeling pretty good today though.  She was compliant with home exercise program.  Response to previous treatment: none reported  Functional change: none reported    Pain: 4-5/10  Location: bilateral low back (L4-L5 level) occasional symptoms running into L LE    OBJECTIVE     Objective Measures updated at progress report unless specified.       TREATMENT   (exercises and techniques performed today are bolded)    Annika received the treatments listed below:      THERAPEUTIC EXERCISES to develop  strength, endurance, ROM, flexibility, posture and core stabilization for 30 minutes including:  Mat exercises performed with wedge and 1 pillow to elevate trunk      Hamstring stretch 5 x 15"  Hip adduction with ball 2 x 10 x 10"  LAQ 2 x 10 #2  +Ball squeeze 2 x 10 with 3" holds  +Belt block 2 x 10 with 3" holds    PPT 2 x 10 with 5" holds  LTR x10 each way  PPT marches 2 x 20 +GTB  Supine hip abduction 2 x 10 GTB  SL clamshells 2 x 10 each  +SL hip internal rotation 2 x 10    +Progress to seated exercises for posture    MANUAL THERAPY TECHNIQUES were " applied  for 15 minutes including:     MFR of L lower lumbar paraspinals (multifidi), piriformis, hamstrings, and gastroc      NEUROMUSCULAR RE-EDUCATION ACTIVITIES to improve Balance, Coordination, Kinesthetic, Sense, Proprioception and Posture for 0 minutes.  The following were included:             PATIENT EDUCATION AND HOME EXERCISES     Home Exercises Provided and Patient Education Provided     Education provided:   - educated pt that mild soreness is an expected outcome to therapy session  - educated pt to continue with HEP as issued and prescribed    Written Home Exercises Provided: Patient instructed to cont prior HEP. Exercises were reviewed and Annika was able to demonstrate them prior to the end of the session.  Annika demonstrated good  understanding of the education provided. See EMR under Patient Instructions for exercises provided during therapy sessions    ASSESSMENT     Pt presents today reporting improvement in her low back pain since last visit, soreness from exercises.. Continued with exercises implemented at last visit to further good tolerance and improved form. Will progress to seated exercises next visit to address posture. Focused manual therapy at lower lumbar, along PSIS and iliac crest musculature and fascia to higher tenderness to palpation but pt responded well with improvement in soft tissue extensibility.    Annika Is progressing well towards her goals.   Pt prognosis is Fair.     Pt will continue to benefit from skilled outpatient physical therapy to address the deficits listed in the problem list box on initial evaluation, provide pt/family education and to maximize pt's level of independence in the home and community environment.     Pt's spiritual, cultural and educational needs considered and pt agreeable to plan of care and goals.     Anticipated barriers to physical therapy: chronic pain and co-morbidities     Goals:   Short Term Goals:  4 weeks   1. Pain: Pt will demonstrate  improved pain by reports of less than or equal to 5/10 worst pain on the verbal rating scale in order to progress toward maximal functional ability and improve QOL. PC   2.  HEP: Patient will demonstrate independence with HEP in order to progress toward functional independence. PC      Long Term Goals:  8 weeks   1. Function: Patient will demonstrate improved function as indicated by a functional limitation score of 37% on the FOTO. PC   2. Mobility: Patient will present with full lumbar active range of motion in order to return to maximal functional potential and improve quality of life. PC   3. Strength: Patient will improve strength to 5/5 in bilateral lower extremities in order to improve functional independence and quality of life. PC   4. Patient will return to normal ADL's, community involvement, and recreational activities with no pain and maximal function. PC      Goals Key:  PC= progressing/continue;        PM= partially met;             DC= discontinue        PLAN     Continue PT per POC, assess tolerance to today's session,  progress exercise as tolerated and appropriate      Elijah Latif, PTA   4/27/2022

## 2022-04-28 ENCOUNTER — TELEPHONE (OUTPATIENT)
Dept: ENDOCRINOLOGY | Facility: CLINIC | Age: 72
End: 2022-04-28
Payer: MEDICARE

## 2022-04-28 NOTE — TELEPHONE ENCOUNTER
LVM that I spoke with [employer] regarding her receiving patients insulin. Per [employer] patient should call customer service.

## 2022-04-28 NOTE — TELEPHONE ENCOUNTER
LVM that Humalog was sent to pharmacy this month April 2022. Patient should contact her Pharmacy regarding refills

## 2022-04-29 NOTE — TELEPHONE ENCOUNTER
Please schedule for the RFAs starting with Right Lumbar L4-5 and L5-S1, then the left side 1 week later.  IV sedation for both, please.  F/U with my 6 weeks after the second RFA.       Pt has been scheduled.   Denies blood thinners, pacemaker, antibiotics. diabetic     Will send instructions via mail, per patient request.   ER

## 2022-05-02 ENCOUNTER — CLINICAL SUPPORT (OUTPATIENT)
Dept: REHABILITATION | Facility: HOSPITAL | Age: 72
End: 2022-05-02
Payer: MEDICARE

## 2022-05-02 DIAGNOSIS — M53.86 DECREASED RANGE OF MOTION OF LUMBAR SPINE: Primary | ICD-10-CM

## 2022-05-02 PROCEDURE — 97110 THERAPEUTIC EXERCISES: CPT | Mod: PN,CQ

## 2022-05-02 PROCEDURE — 97140 MANUAL THERAPY 1/> REGIONS: CPT | Mod: PN,CQ

## 2022-05-02 NOTE — PROGRESS NOTES
"[facility]   Physical Therapy Treatment Note     Name: Annika Mac  [facility] Number: [id number]    Therapy Diagnosis:   Encounter Diagnosis   Name Primary?    Decreased range of motion of lumbar spine Yes     Physician: Evangelist Hamilton FNP    Visit Date: 5/2/2022    Physician Orders: PT Eval and Treat   Medical Diagnosis from Referral:   M47.816 (ICD-10-CM) - Lumbar spondylosis   M47.816 (ICD-10-CM) - Lumbar facet arthropathy   M51.36 (ICD-10-CM) - DDD (degenerative disc disease), lumbar   Evaluation Date: 4/12/2022  Authorization Period Expiration: 4/26/2022  Plan of Care Expiration: 7/12/2022  Visit # / Visits authorized: 2/ 12 (+eval)  PTA Visit #: 2     PN DUE: 5/12/2022    Precautions: Standard and Diabetes    Time In: 10:00am  Time Out: 10:45am  Total Billable Time: 45 minutes      SUBJECTIVE     Pt reports: Back is doing pretty good today. Mostly just back pain, it is not progressing down her leg currently. Pain does sometimes go down her leg but not often, sometimes she will wake up with it.  She was compliant with home exercise program.  Response to previous treatment: none reported  Functional change: none reported    Pain: 6-7/10   Location: bilateral low back (L4-L5 level) occasional symptoms running into L LE    OBJECTIVE     Objective Measures updated at progress report unless specified.       TREATMENT   (exercises and techniques performed today are bolded)    Annika received the treatments listed below:      THERAPEUTIC EXERCISES to develop  strength, endurance, ROM, flexibility, posture and core stabilization for 35 minutes including:    Mat exercises performed with wedge and 1 pillow to elevate trunk      Hamstring stretch 5 x 15"  Hip adduction with ball 2 x 10 x 10"  Belt block 2 x 10 with 3" holds  LAQ 2 x 10 #2    PPT 2 x 10 with 5" holds  LTR x10 each way  PPT marches 2 x 20 +GTB  Supine hip abduction 2 x 10 GTB  SL clamshells 2 x 10 each  SL hip internal rotation 2 x " 10    +Seated posterior pelvic tilts 2 x 10  +Seated thoracic rotations with wand x15 ea  +Seated overhead raises with wand x15 for thoracic ext    MANUAL THERAPY TECHNIQUES were applied  for 10 minutes including:     MFR of L lower lumbar paraspinals (multifidi), piriformis, hamstrings, and gastroc in L sidelying      NEUROMUSCULAR RE-EDUCATION ACTIVITIES to improve Balance, Coordination, Kinesthetic, Sense, Proprioception and Posture for 0 minutes.  The following were included:             PATIENT EDUCATION AND HOME EXERCISES     Home Exercises Provided and Patient Education Provided     Education provided:   - educated pt that mild soreness is an expected outcome to therapy session  - educated pt to continue with HEP as issued and prescribed    Written Home Exercises Provided: Patient instructed to cont prior HEP. Exercises were reviewed and Annika was able to demonstrate them prior to the end of the session.  Annika demonstrated good  understanding of the education provided. See EMR under Patient Instructions for exercises provided during therapy sessions    ASSESSMENT     Pt presents today with increased pain compared to last visit, but does report overall improvement. Progressed to seated exercises to address posture and progress towards standing exercises. Continued to apply manual therapy to L low back musculature with emphasis on soft tissue and muscle surrounding L4-L5_S1 and L PSIS/Iliac crest, pt had high tenderness in the area but responds well to manual therapy with improvement in soft tissue extensibility.     Annika Is progressing well towards her goals.   Pt prognosis is Fair.     Pt will continue to benefit from skilled outpatient physical therapy to address the deficits listed in the problem list box on initial evaluation, provide pt/family education and to maximize pt's level of independence in the home and community environment.     Pt's spiritual, cultural and educational needs considered and pt  agreeable to plan of care and goals.     Anticipated barriers to physical therapy: chronic pain and co-morbidities     Goals:   Short Term Goals:  4 weeks   1. Pain: Pt will demonstrate improved pain by reports of less than or equal to 5/10 worst pain on the verbal rating scale in order to progress toward maximal functional ability and improve QOL. PC   2.  HEP: Patient will demonstrate independence with HEP in order to progress toward functional independence. Met 5/2/2022      Long Term Goals:  8 weeks   1. Function: Patient will demonstrate improved function as indicated by a functional limitation score of 37% on the FOTO. PC   2. Mobility: Patient will present with full lumbar active range of motion in order to return to maximal functional potential and improve quality of life. PC   3. Strength: Patient will improve strength to 5/5 in bilateral lower extremities in order to improve functional independence and quality of life. PC   4. Patient will return to normal ADL's, community involvement, and recreational activities with no pain and maximal function. PC      Goals Key:  PC= progressing/continue;        PM= partially met;             DC= discontinue        PLAN     Continue PT per POC, assess tolerance to today's session,  progress exercise as tolerated and appropriate      Elijah Latif PTA   5/2/2022

## 2022-05-05 ENCOUNTER — OFFICE VISIT (OUTPATIENT)
Dept: SLEEP MEDICINE | Facility: CLINIC | Age: 72
End: 2022-05-05
Payer: MEDICARE

## 2022-05-05 DIAGNOSIS — G47.33 OSA (OBSTRUCTIVE SLEEP APNEA): Primary | ICD-10-CM

## 2022-05-05 PROCEDURE — 3066F PR DOCUMENTATION OF TREATMENT FOR NEPHROPATHY: ICD-10-PCS | Mod: CPTII,95,, | Performed by: NURSE PRACTITIONER

## 2022-05-05 PROCEDURE — 3060F PR POS MICROALBUMINURIA RESULT DOCUMENTED/REVIEW: ICD-10-PCS | Mod: CPTII,95,, | Performed by: NURSE PRACTITIONER

## 2022-05-05 PROCEDURE — 4010F PR ACE/ARB THEARPY RXD/TAKEN: ICD-10-PCS | Mod: CPTII,95,, | Performed by: NURSE PRACTITIONER

## 2022-05-05 PROCEDURE — 3060F POS MICROALBUMINURIA REV: CPT | Mod: CPTII,95,, | Performed by: NURSE PRACTITIONER

## 2022-05-05 PROCEDURE — 3052F HG A1C>EQUAL 8.0%<EQUAL 9.0%: CPT | Mod: CPTII,95,, | Performed by: NURSE PRACTITIONER

## 2022-05-05 PROCEDURE — 99499 NO LOS: ICD-10-PCS | Mod: 95,,, | Performed by: NURSE PRACTITIONER

## 2022-05-05 PROCEDURE — 3066F NEPHROPATHY DOC TX: CPT | Mod: CPTII,95,, | Performed by: NURSE PRACTITIONER

## 2022-05-05 PROCEDURE — 99499 UNLISTED E&M SERVICE: CPT | Mod: 95,,, | Performed by: NURSE PRACTITIONER

## 2022-05-05 PROCEDURE — 3052F PR MOST RECENT HEMOGLOBIN A1C LEVEL 8.0 - < 9.0%: ICD-10-PCS | Mod: CPTII,95,, | Performed by: NURSE PRACTITIONER

## 2022-05-05 PROCEDURE — 4010F ACE/ARB THERAPY RXD/TAKEN: CPT | Mod: CPTII,95,, | Performed by: NURSE PRACTITIONER

## 2022-05-06 ENCOUNTER — TELEPHONE (OUTPATIENT)
Dept: SLEEP MEDICINE | Facility: CLINIC | Age: 72
End: 2022-05-06
Payer: MEDICARE

## 2022-05-06 NOTE — TELEPHONE ENCOUNTER
----- Message from Ollie Mansfield MA sent at 5/5/2022 12:38 PM CDT -----  Type: Patient Call Back    Who called:Self    What is the request in detail:pt. Would like to be scheduled with this provider in [place].. upon trying no schedule would come up for her ..     Can the clinic reply by [facility]?no    Would the patient rather a call back or a response via [facility]? yes    Best call back number: [phone] (work)

## 2022-05-09 ENCOUNTER — CLINICAL SUPPORT (OUTPATIENT)
Dept: DIABETES | Facility: CLINIC | Age: 72
End: 2022-05-09
Payer: MEDICARE

## 2022-05-09 VITALS — WEIGHT: 288.69 LBS | BODY MASS INDEX: 49.55 KG/M2

## 2022-05-09 DIAGNOSIS — N18.4 TYPE 2 DM WITH CKD STAGE 4 AND HYPERTENSION: ICD-10-CM

## 2022-05-09 DIAGNOSIS — E11.22 TYPE 2 DM WITH CKD STAGE 4 AND HYPERTENSION: ICD-10-CM

## 2022-05-09 DIAGNOSIS — I12.9 TYPE 2 DM WITH CKD STAGE 4 AND HYPERTENSION: ICD-10-CM

## 2022-05-09 PROCEDURE — G0108 DIAB MANAGE TRN  PER INDIV: HCPCS | Mod: S$GLB,,, | Performed by: INTERNAL MEDICINE

## 2022-05-09 PROCEDURE — 99999 PR PBB SHADOW E&M-EST. PATIENT-LVL II: ICD-10-PCS | Mod: PBBFAC,,,

## 2022-05-09 PROCEDURE — G0108 PR DIAB MANAGE TRN  PER INDIV: ICD-10-PCS | Mod: S$GLB,,, | Performed by: INTERNAL MEDICINE

## 2022-05-09 PROCEDURE — 99999 PR PBB SHADOW E&M-EST. PATIENT-LVL II: CPT | Mod: PBBFAC,,,

## 2022-05-09 NOTE — PROGRESS NOTES
Diabetes Care Specialist Progress Note  Author: Maria G Lopez RN, CDE  Date: 5/9/2022    Program Intake  Reason for Diabetes Program Visit:: Intervention  Type of Intervention:: Individual  Individual: Education  Education: Self-Management Skill Review, Nutrition and Meal Planning  Current diabetes risk level:: moderate  In the last 12 months, have you:: none  Was the ER or hospital admission related to diabetes?: Yes  Permission to speak with others about care:: no    Lab Results   Component Value Date    HGBA1C 8.1 (H) 04/11/2022       Clinical  Problem Review  Reviewed Problem List with Patient: yes  Active comorbidities affecting diabetes self-care.: yes  Comorbidities: Hypertension, Chronic Kidney Disease, Other (comment) (Difficulty with ambulation)  Reviewed health maintenance: yes    Clinical Assessment  Have you ever experienced hypoglycemia (low blood sugar)?: yes  In the last month, how often have you experienced low blood sugar?: once a day  What symptoms do you experience?: Dizzy/Light-headed, Shaky  Have you ever been hospitalized because your blood sugar was too low?: yes (see comments) (Was treated at hospital in April for low bg)  Have you ever experienced hyperglycemia (high blood sugar)?: yes  In the last month, how often have you experienced high blood sugar?: more than once a day    Nutritional Status  Meal Plan 24 Hour Recall: Breakfast, Lunch, Snack  Meal Plan 24 Hour Recall - Breakfast: yesterday; waffles, eggs, turkey phan  Meal Plan 24 Hour Recall - Lunch: cabbage and corn bread  Meal Plan 24 Hour Recall - Snack: juice, peanut butter crackers    Additional Social History    Support  Does anyone support you with your diabetes care?: yes    Access to Mass Media & Technology  Does the patient have access to any of the following devices or technologies?: Smart phone (Not compatible with Dexcom nicholas)    Health Literacy  Preferred Learning Method: Face to Face      Diabetes Self-Management  Skills Assessment    Diabetes Disease Process/Treatment Options  Patient/caregiver able to state what happens when someone has diabetes.: yes  Patient/caregiver knows what type of diabetes they have.: yes  Diabetes Type : Type II  Assessment indicates:: Adequate understanding  Area of need?: No    Nutrition/Healthy Eating  Challenges to healthy eating:: portion control  Method of carbohydrate measurement:: no method  Patient can identify foods that impact blood sugar.: yes  Patient-identified foods:: sweets  Assessment indicates:: Knowledge deficit, Instruction Needed  Area of need?: Yes    Physical Activity/Exercise  Patient's daily activity level:: sedentary  Patient formally exercises outside of work.: no  Reasons for not exercising:: physically unable to exercise currently  Patient can identify forms of physical activity.: no  Assessment indicates:: Knowledge deficit  Area of need?: Yes    Medications  Patient is able to describe current diabetes management routine.: yes  Diabetes management routine:: insulin  Patient is able to identify current diabetes medications, dosages, and appropriate timing of medications.: yes  Patient understands the purpose of the medications taken for diabetes.: yes  Patient reports problems or concerns with current medication regimen.: yes  Medication regimen problems/concerns:: concerned about side effects  Assessment indicates:: Adequate understanding  Area of need?: No    Home Blood Glucose Monitoring  Patient states that blood sugar is checked at home daily.: yes  Monitoring Method:: home glucometer  Home glucometer meter type:: Insurance preferred meter  Blood glucose logs:: yes, encouraged to keep logs, encouraged to bring logs to provider visits  Blood glucose logs reviewed today?: yes  Assessment indicates:: Adequate understanding  Area of need?: No    Acute Complications  Able to state the blood sugar range for hypoglycemia?: yes  Patient can identify general symptoms of  hypoglycemia: yes  Patient identified:: shakiness  Able to state proper treatment of hypoglycemia?: yes  Patient identified:: 5-6 pieces of hard candy, 1/2 can soda/fruit juice  Assessment indicates:: Adequate understanding  Area of need?: No    Chronic Complications  Patient can identify major chronic complications of diabetes.: yes  Stated chronic complications:: kidney disease  Patient can identify ways to prevent or delay diabetes complications.: yes  Stated ways to prevent complications:: controlling blood sugar  Assessment indicates:: Knowledge deficit  Area of need?: Yes    Psychosocial/Coping  Patient can identify ways of coping with chronic disease.: yes  Patient-stated ways of coping with chronic disease:: support from loved ones  Assessment indicates:: Adequate understanding  Area of need?: No      Diabetes Self Support Plan         Assessment Summary and Plan    Based on today's diabetes care assessment, the following areas of need were identified:      Social 5/4/2021   Support No   Access to Mass Media/Tech Yes   Cognitive/Behavioral Health No   Health Literacy No        Clinical 2/17/2022   Medication Adherence No        Diabetes Self-Management Skills 5/9/2022   Diabetes Disease Process/Treatment Options No   Nutrition/Healthy Eating Yes, see care planning   Physical Activity/Exercise Yes, having back surgery this week.  She is hoping this alleviates her pain so that she can increase her activity.    Medication No,  Her dose of Tresiba was increased to 40 units.     Home Blood Glucose Monitoring No   Acute Complications No   Chronic Complications Yes   Psychosocial/Coping No          Today's interventions were provided through individual discussion, instruction, and written materials were provided.      Patient verbalized understanding of instruction and written materials.  Pt was able to return back demonstration of instructions today. Patient understood key points, needs reinforcement and further  instruction.     Diabetes Self-Management Care Plan:    Today's Diabetes Self-Management Care Plan was developed with Annika's input. Annika has agreed to work toward the following goal(s) to improve his/her overall diabetes control.      Care Plan: Diabetes Management   Updates made since 4/9/2022 12:00 AM        Problem: Medications         Goal: Patient Agrees to take Diabetes Medication(s) as prescribed.    Start Date: 5/4/2021   Expected End Date: 8/31/2021   This Visit's Progress: On track   Recent Progress: On track   Priority: High   Barriers: No Barriers Identified   Note:    Dexcom download was evaluated.  She is having lows at various times of the days. Most lows are occurring overnight and in the afternoon which corresponds to her reported low she experinced a few weeks back where intervention was required. Denies any missed doses and is eating 3 meals per day.  Per HCP her insulin will be reduced to the following: Novolin 70/30 80 units in am and 40 units in pm.  She will stop giving the lunch dose.     She records her carb amounts in Dexcom, however she has been entering them incorrectly and reflects a larger amount of carbs than what she is consuming.   She was instructed on how to enter the correct amt into her Dexcom.     Update to care planning 2/17/2022:  Currently taking Tresiba 30 units in am, Humalog 10 units ac meals and Ozempic.  Blood glucose logs reviewed.  Patient doing well with her numbers see attached logs.  Not using the Dexcom.  Current A1c down to 6.6%    Update to care planning 4/18/2022:  current A1c is up at 8.1%.  this is an increase from January A1c.  Dr. Love has increased her Tresiba to 35 and Humalog 10/11/11 plus s/s.  She also has been in pain with her back which is causing her stress and discomfort.  This may also be a contributing factor to her elevated numbers. Continues with ozempic.     Update to care planning 5/9/2022: Patient today for DM ed.  BG logs reviewed by MD.  Dr. Love increased her Tresiba from 35 to 40 units daily for elevations especially in am.  Ms. Mac was not using the sliding scale as ordered.  Reviewed how to use it and instructed to use 150 +1 s/s with Humalog only.  Currently takes 10/11/11 with meals.  Understanding verbalized.        Problem: Healthy Eating         Goal: Eat 2-3 meals daily with 30-45g/2-3 servings of Carbohydrate per meal. Limit snacking in between meal to 1 serving (15 grams).    Start Date: 2/17/2022   Expected End Date: 8/31/2022   This Visit's Progress: On track   Priority: Medium   Barriers: Lack of Motivation to Change   Note:    Reviewed meal planning and general nutritional counseling with regards to diabetes management. Meal habits reviewed. We concentrated on portion sizes and carb limits for each meal.  This has been a challenge for Annika in the past.  Stress the importance of making better meal choices and to measure all foods.   Encouraged increased consumption of non starchy veggies to diet.  Overall planning meals and making better choices.  Patient is motivated to make changes.     Update to care planning 4/18/2022: States eating schedule is off since she cares for grandchildren 3-5 x per week and tends to eat what is available. Suggested bringing fruit and salad with her when she is out of the house and to try to avoid snack type foods. States wants to do better and is willing to try.     Update to care planning 5/9/2022:  Reviewed changes made since last visit.  States she feels she is more on schedule with her meals now.  Usually doing a shake for breakfast.  Encouraged to measure all foods especially carbs. States she is eating more salads.          Follow Up Plan     F/u in august to review goals.     Today's care plan and follow up schedule was discussed with patient.  Annika verbalized understanding of the care plan, goals, and agrees to follow up plan.        The patient was encouraged to communicate with his/her  health care provider/physician and care team regarding his/her condition(s) and treatment.  I provided the patient with my contact information today and encouraged to contact me via phone or [facility] Patient Portal as needed.     Length of Visit   Total Time: 45 Minutes

## 2022-05-09 NOTE — PROGRESS NOTES
"[facility]   Physical Therapy Treatment Note     Name: Annika Mac  [facility] Number: [id number]    Therapy Diagnosis:   Encounter Diagnosis   Name Primary?    Decreased range of motion of lumbar spine Yes     Physician: Evangelist Hamilton FNP    Visit Date: 5/10/2022    Physician Orders: PT Eval and Treat   Medical Diagnosis from Referral:   M47.816 (ICD-10-CM) - Lumbar spondylosis   M47.816 (ICD-10-CM) - Lumbar facet arthropathy   M51.36 (ICD-10-CM) - DDD (degenerative disc disease), lumbar   Evaluation Date: 4/12/2022  Authorization Period Expiration: 4/26/2022  Plan of Care Expiration: 7/12/2022  Visit # / Visits authorized: 4/ 12 (+eval)  PTA Visit #: 0     PN DUE: 5/12/2022    Precautions: Standard and Diabetes    Time In: 9:17 am  Time Out: 9:57 am  Total Billable Time: 40 minutes      SUBJECTIVE     Pt reports: her back is bothering her today.   She was compliant with home exercise program.  Response to previous treatment: none reported  Functional change: increased standing tolerance     Pain: 7-8/10   Location: bilateral low back (L4-L5 level) occasional symptoms running into L LE    OBJECTIVE     Objective Measures updated at progress report unless specified.       TREATMENT   (exercises and techniques performed today are bolded)    Annika received the treatments listed below:      THERAPEUTIC EXERCISES to develop  strength, endurance, ROM, flexibility, posture and core stabilization for 35 minutes including:    + Nustep for increased lower extremity range of motion, strength, and endurance x 5 minutes     Mat exercises performed with wedge and 1 pillow to elevate trunk      Hamstring stretch 5 x 15"  Hip adduction with ball 2 x 10 x 10"   Belt block 2 x 10 with 3" holds   LAQ 2 x 10 #3     PPT 2 x 10 with 5" holds   LTR x10 each way   PPT marches 2 x 20 +green theraband   Supine hip abduction 2 x 10 green theraband   SL clamshells 2 x 10 each   SL hip internal rotation 2 x 10 "     Seated posterior pelvic tilts 2 x 10   Seated thoracic rotations with wand x15 ea   Seated overhead raises with wand x15 for thoracic ext     MANUAL THERAPY TECHNIQUES were applied  for 10 minutes including:     MFR of L lower lumbar paraspinals (multifidi), piriformis, hamstrings, and gastroc in L sidelying       NEUROMUSCULAR RE-EDUCATION ACTIVITIES to improve Balance, Coordination, Kinesthetic, Sense, Proprioception and Posture for 0 minutes.  The following were included:             PATIENT EDUCATION AND HOME EXERCISES     Home Exercises Provided and Patient Education Provided     Education provided:   - educated pt that mild soreness is an expected outcome to therapy session  - educated pt to continue with HEP as issued and prescribed    Written Home Exercises Provided: Patient instructed to cont prior HEP. Exercises were reviewed and Annika was able to demonstrate them prior to the end of the session.  Annika demonstrated good  understanding of the education provided. See EMR under Patient Instructions for exercises provided during therapy sessions    ASSESSMENT     Pt presents today with continued low back pain, but reports increased standing tolerance since starting therapy. Nustep was performed for increased lower extremity range of motion, strength, and endurance. Patient tolerated Nustep well with no complaints or adverse effects. Patient continues to present with hypertonicity of left low back musculature and tenderness to palpation.      Annika Is progressing well towards her goals.   Pt prognosis is Fair.     Pt will continue to benefit from skilled outpatient physical therapy to address the deficits listed in the problem list box on initial evaluation, provide pt/family education and to maximize pt's level of independence in the home and community environment.     Pt's spiritual, cultural and educational needs considered and pt agreeable to plan of care and goals.     Anticipated barriers to physical  therapy: chronic pain and co-morbidities     Goals:   Short Term Goals:  4 weeks   1. Pain: Pt will demonstrate improved pain by reports of less than or equal to 5/10 worst pain on the verbal rating scale in order to progress toward maximal functional ability and improve QOL. PC   2.  HEP: Patient will demonstrate independence with HEP in order to progress toward functional independence. Met 5/2/2022      Long Term Goals:  8 weeks   1. Function: Patient will demonstrate improved function as indicated by a functional limitation score of 37% on the FOTO. PC   2. Mobility: Patient will present with full lumbar active range of motion in order to return to maximal functional potential and improve quality of life. PC   3. Strength: Patient will improve strength to 5/5 in bilateral lower extremities in order to improve functional independence and quality of life. PC   4. Patient will return to normal ADL's, community involvement, and recreational activities with no pain and maximal function. PC      Goals Key:  PC= progressing/continue;        PM= partially met;             DC= discontinue        PLAN     Continue PT per POC, assess tolerance to today's session,  progress exercise as tolerated and appropriate      Liz Robin, PT, DPT, PPCES   5/10/2022

## 2022-05-10 ENCOUNTER — CLINICAL SUPPORT (OUTPATIENT)
Dept: REHABILITATION | Facility: HOSPITAL | Age: 72
End: 2022-05-10
Payer: MEDICARE

## 2022-05-10 DIAGNOSIS — M53.86 DECREASED RANGE OF MOTION OF LUMBAR SPINE: Primary | ICD-10-CM

## 2022-05-10 PROCEDURE — 97110 THERAPEUTIC EXERCISES: CPT | Mod: PN

## 2022-05-10 PROCEDURE — 97140 MANUAL THERAPY 1/> REGIONS: CPT | Mod: PN

## 2022-05-11 ENCOUNTER — TELEPHONE (OUTPATIENT)
Dept: PAIN MEDICINE | Facility: CLINIC | Age: 72
End: 2022-05-11
Payer: MEDICARE

## 2022-05-11 NOTE — DISCHARGE INSTRUCTIONS
Home Care Instructions Pain Management:    1.  DIET:    You may resume your normal diet today.    2.  BATHING:    You may shower with luke warm water.    3.  DRESSING:    You may remove your bandage today.    4.  ACTIVITY LEVEL:      You may resume your normal activities 24 hours after your procedure.    5.  MEDICATIONS:    You may resume your normal medications today.    6.  SPECIAL INSTRUCTIONS:    No heat to the injection site for 24 hours including bath or shower, heating pad, moist heat or hot tubs.    Use an ice pack to the injection site for any pain or discomfort.  Apply ice packs for 20 minute intervals as needed.    If you have received any sedatives by mouth today, you can not drive for 12 hours.    If you have received sedation through an IV, you can not drive for 24 hours.    PLEASE CALL YOUR DOCTOR FOR THE FOLLOWIN.  Redness or swelling around the injection site.  2.  Fever of 101 degrees.  3.  Drainage (pus) from the injection site.  4.  For any continuous bleeding (some dried blood over the incision is normal.)    FOR EMERGENCIES:    If any unusual problems or difficulties occur during clinic hours, call [phone] or dial [phone].    Follow up with with your physician in 2-3 weeks.

## 2022-05-12 ENCOUNTER — HOSPITAL ENCOUNTER (OUTPATIENT)
Facility: HOSPITAL | Age: 72
Discharge: HOME | End: 2022-05-12
Attending: PAIN MEDICINE | Admitting: PAIN MEDICINE
Payer: MEDICARE

## 2022-05-12 VITALS
RESPIRATION RATE: 16 BRPM | TEMPERATURE: 98 F | DIASTOLIC BLOOD PRESSURE: 64 MMHG | SYSTOLIC BLOOD PRESSURE: 133 MMHG | BODY MASS INDEX: 46.65 KG/M2 | HEIGHT: 65 IN | OXYGEN SATURATION: 100 % | WEIGHT: 280 LBS | HEART RATE: 59 BPM

## 2022-05-12 DIAGNOSIS — M47.816 LUMBAR FACET ARTHROPATHY: Primary | ICD-10-CM

## 2022-05-12 DIAGNOSIS — G89.29 CHRONIC PAIN: ICD-10-CM

## 2022-05-12 LAB
POCT GLUCOSE: 209 MG/DL (ref 70–110)
POCT GLUCOSE: 245 MG/DL (ref 70–110)

## 2022-05-12 PROCEDURE — 63600175 PHARM REV CODE 636 W HCPCS: Performed by: PAIN MEDICINE

## 2022-05-12 PROCEDURE — 64636 PR DESTROY L/S FACET JNT ADDL: ICD-10-PCS | Mod: RT,,, | Performed by: PAIN MEDICINE

## 2022-05-12 PROCEDURE — 99152 MOD SED SAME PHYS/QHP 5/>YRS: CPT | Performed by: PAIN MEDICINE

## 2022-05-12 PROCEDURE — 64636 DESTROY L/S FACET JNT ADDL: CPT | Mod: RT,,, | Performed by: PAIN MEDICINE

## 2022-05-12 PROCEDURE — 64635 PR DESTROY LUMB/SAC FACET JNT: ICD-10-PCS | Mod: RT,,, | Performed by: PAIN MEDICINE

## 2022-05-12 PROCEDURE — 64635 DESTROY LUMB/SAC FACET JNT: CPT | Mod: RT,,, | Performed by: PAIN MEDICINE

## 2022-05-12 PROCEDURE — 25000003 PHARM REV CODE 250: Performed by: PAIN MEDICINE

## 2022-05-12 PROCEDURE — 64635 DESTROY LUMB/SAC FACET JNT: CPT | Performed by: PAIN MEDICINE

## 2022-05-12 PROCEDURE — 64636 DESTROY L/S FACET JNT ADDL: CPT | Performed by: PAIN MEDICINE

## 2022-05-12 RX ORDER — SODIUM CHLORIDE 9 MG/ML
500 INJECTION, SOLUTION INTRAVENOUS CONTINUOUS
Status: DISCONTINUED | OUTPATIENT
Start: 2022-05-12 | End: 2022-05-12 | Stop reason: HOSPADM

## 2022-05-12 RX ORDER — BUPIVACAINE HYDROCHLORIDE 2.5 MG/ML
INJECTION, SOLUTION EPIDURAL; INFILTRATION; INTRACAUDAL
Status: DISCONTINUED | OUTPATIENT
Start: 2022-05-12 | End: 2022-05-12 | Stop reason: HOSPADM

## 2022-05-12 RX ORDER — SODIUM BICARBONATE 1 MEQ/ML
SYRINGE (ML) INTRAVENOUS
Status: DISCONTINUED | OUTPATIENT
Start: 2022-05-12 | End: 2022-05-12 | Stop reason: HOSPADM

## 2022-05-12 RX ORDER — LIDOCAINE HYDROCHLORIDE 20 MG/ML
INJECTION, SOLUTION EPIDURAL; INFILTRATION; INTRACAUDAL; PERINEURAL
Status: DISCONTINUED | OUTPATIENT
Start: 2022-05-12 | End: 2022-05-12 | Stop reason: HOSPADM

## 2022-05-12 RX ORDER — LIDOCAINE HYDROCHLORIDE 10 MG/ML
INJECTION INFILTRATION; PERINEURAL
Status: DISCONTINUED | OUTPATIENT
Start: 2022-05-12 | End: 2022-05-12 | Stop reason: HOSPADM

## 2022-05-12 RX ORDER — LIDOCAINE HYDROCHLORIDE 10 MG/ML
1 INJECTION, SOLUTION EPIDURAL; INFILTRATION; INTRACAUDAL; PERINEURAL ONCE
Status: DISCONTINUED | OUTPATIENT
Start: 2022-05-12 | End: 2022-05-12 | Stop reason: HOSPADM

## 2022-05-12 RX ORDER — FENTANYL CITRATE 50 UG/ML
INJECTION, SOLUTION INTRAMUSCULAR; INTRAVENOUS
Status: DISCONTINUED | OUTPATIENT
Start: 2022-05-12 | End: 2022-05-12 | Stop reason: HOSPADM

## 2022-05-12 RX ORDER — METHYLPREDNISOLONE ACETATE 40 MG/ML
INJECTION, SUSPENSION INTRA-ARTICULAR; INTRALESIONAL; INTRAMUSCULAR; SOFT TISSUE
Status: DISCONTINUED | OUTPATIENT
Start: 2022-05-12 | End: 2022-05-12 | Stop reason: HOSPADM

## 2022-05-12 RX ORDER — MIDAZOLAM HYDROCHLORIDE 1 MG/ML
INJECTION, SOLUTION INTRAMUSCULAR; INTRAVENOUS
Status: DISCONTINUED | OUTPATIENT
Start: 2022-05-12 | End: 2022-05-12 | Stop reason: HOSPADM

## 2022-05-12 NOTE — OP NOTE
Procedure Note    Preoperative Diagnosis: Lumbar Spondylosis  Postoperative Diagnosis: Lumbar Spondylosis  Procedure Date: 05/12/2022  Procedure Title:  (1) Right L4-5 and L5-S1 Lumbar Medial Branch Radiofrequency Ablation     (2) Intraoperative Fluoroscopy     (3) IV Moderate Sedation (Midazolam 2 mg, Fentanyl 50 mcg)    Anesthesia: Local    Indications: Annika Mac is a 71 y.o. year-old female with a diagnosis of back pain due to lumbar or lumbosacral spondylosis.  The patient had significant relief of the pain with the previous diagnostic nerve blocks.     Findings: Final needle placement consistent with technically sucsessful procedure.     Procedure in Detail: The patients history and physical exam were reviewed.  Informed consent obtained after explaining the procedure and potential complications including local discomfort, infection, headache, temporary or permanent weakness and/or numbness of one or both legs, temporary or permanent paraplegia, heart attack and stroke. The patient agreed to proceed, and written informed consent was obtained.    Patient was brought back to procedure room and placed in a prone position and head resting comfortably in head ring. Prior to the initiation of the procedure, the patient's identity, the site, and the nature of the procedure were verified.  AP and oblique fluoroscopy were used to identify and trina the junctions between the superior articular processes and transverse processes at the L4 and L5 levels on the Right side.  The Right sacral ala was also identified and marked.  The skin and subcutaneous tissues in these identified areas was anesthetized with 2-3 mL of lidocaine 1% at each level. An insulated 20 gauge styletted needle (150 mm) was advanced towards each of these points under fluoroscopic guidance. Once os was contacted, negative aspiration was confirmed. Needle placement was optimized in lateral fluoroscopic views.  Stylettes were removed and probes  inserted.    Sensory stimulation was performed at 50 Hz & 0.4V, generating a pressure sensation. Motor stimulation at 2 Hz & 2 V was negative for muscle contractions in the above mentioned nerve distributions except for local multifidus twitch. One mL of 2% lidocaine was injected at each level prior to lesioning, which was performed for 90 seconds at 80 degrees Centigrade. Once the lesion was complete, needles were withdrawn slight and rotated 180 degrees.  A Second lesion was performed.    One mL from a mixture consisting of 2 mL 0.25% bupivacaine and 40 mg Depomedrol was injected through each probe. The probes were removed with a 1% lidocaine flush.      After the procedure was completed, the patients back was cleaned and bandages were placed at the needle insertion sites.    Estimated blood loss: None    Complications: None     Disposition:  The patient tolerated the procedure well and there were no apparent complications. Vital signs remained stable throughout the procedure. The patient was taken to the recovery area where written discharge instructions for the procedure were given.     Follow-up: RTC as scheduled for clinic eval or next procedure.    Alireza Meraz Jr, MD  Interventional Pain Medicine / Anesthesiology

## 2022-05-12 NOTE — H&P (VIEW-ONLY)
"[facility] - [facility] (Hospital)  History & Physical - Short Stay  Pain Management           SUBJECTIVE:     Procedure: Procedure(s) (LRB):  RADIOFREQUENCY THERMAL COAGULATION RIGHT L4-5, L5-S1 (IV Sedation) (Right)    Chief Complaint/Reason for Admission:  Lumbar radiculopathy [M54.16]    PTA Medications   Medication Sig    albuterol (PROAIR HFA) 90 mcg/actuation inhaler Inhale 2 puffs into the lungs every 6 (six) hours as needed for Wheezing. Rescue    allopurinoL (ZYLOPRIM) 100 MG tablet Take 1 tablet (100 mg total) by mouth once daily.    allopurinoL (ZYLOPRIM) 300 MG tablet Take 1 tablet (300 mg total) by mouth once daily.    amLODIPine (NORVASC) 10 MG tablet Take 1 tablet (10 mg total) by mouth once daily.    ammonium lactate 12 % Crea Apply twice daily to affected parts both feet as needed.    aspirin 81 MG Chew Take 1 tablet (81 mg total) by mouth once daily.    BD ULTRA-FINE KIKA PEN NEEDLE 32 gauge x 5/32" Ndle To use 4 times daily    blood sugar diagnostic Strp 1 strip 4 times daily. Contour Next.    blood-glucose meter kit 1 each by Other route once daily. Use daily. Insurance proffered one touch  E11.42    chlorthalidone (HYGROTEN) 25 MG Tab Take 1 tablet (25 mg total) by mouth once daily.    colchicine (COLCRYS) 0.6 mg tablet TAKE 1 TABLET EVERY OTHER DAY    DULoxetine (CYMBALTA) 30 MG capsule TAKE 1 CAPSULE BY MOUTH  ONCE DAILY    famotidine (PEPCID) 20 MG tablet Take 1 tablet (20 mg total) by mouth 2 (two) times daily.    ferrous sulfate (FEOSOL) Tab tablet Take 1 tablet (1 each total) by mouth once daily.    glucagon (GVOKE HYPOPEN 1-PACK) 0.5 mg/0.1 mL AtIn Inject 1 Dose into the skin daily as needed (for severe hypoglycemia if you aren't able to treat by ingesting sugar).    insulin degludec (TRESIBA FLEXTOUCH U-100) 100 unit/mL (3 mL) insulin pen Inject 35 Units into the skin once daily.    insulin lispro (HUMALOG KWIKPEN INSULIN) 100 unit/mL pen Inject 11 Units into the skin " 3 (three) times daily before meals. Plus sliding scale - max TDD 63 units.    irbesartan (AVAPRO) 300 MG tablet Take 1 tablet (300 mg total) by mouth every evening.    labetaloL (NORMODYNE) 300 MG tablet Take 1 tablet (300 mg total) by mouth 2 (two) times daily.    lancets 31 gauge Misc 1 lancet by Misc.(Non-Drug; Combo Route) route 4 (four) times daily. E11.42 . Prescribed one touch    multivitamin (THERAGRAN) per tablet Take 1 tablet by mouth once daily.    pregabalin (LYRICA) 150 MG capsule Take 1 capsule (150 mg total) by mouth 2 (two) times daily.    semaglutide (OZEMPIC) 1 mg/dose (4 mg/3 mL) Inject 1 mg into the skin every 7 days.    simvastatin (ZOCOR) 40 MG tablet Take 1 tablet (40 mg total) by mouth every evening.    sodium bicarbonate 325 MG tablet Take 2 tablets (650 mg total) by mouth 2 (two) times daily.       Review of patient's allergies indicates:   Allergen Reactions    Clindamycin Hives and Swelling    Iodinated contrast media Rash       Past Medical History:   Diagnosis Date    Asthma     Chronic obstructive pulmonary disease, unspecified COPD type 2022    Constipation 10/3/2019    Deep vein thrombophlebitis of left leg 2012    Deep vein thrombosis     when she had a knee replacement    Encounter for blood transfusion     anemic     GERD (gastroesophageal reflux disease)     Obesity, diabetes, and hypertension syndrome 2019    Obstructive sleep apnea 2012    PAD (peripheral artery disease) 2013    Pressure ulcer of toe of left foot     Type 2 diabetes mellitus with obesity 2020    Type 2 diabetes mellitus with peripheral artery disease 2020     Past Surgical History:   Procedure Laterality Date    CATARACT EXTRACTION W/  INTRAOCULAR LENS IMPLANT Left 3/2002    left     CATARACT EXTRACTION W/  INTRAOCULAR LENS IMPLANT Right     OD dr. olivares     SECTION      COLONOSCOPY N/A 2018    Procedure: COLONOSCOPY;   Surgeon: Faizan Mac MD;  Location: [facility] ENDO (2ND FLR);  Service: Endoscopy;  Laterality: N/A;    CYST REMOVAL  2/11/2011    left side of face    CYSTOSCOPY W/ RETROGRADES Left 2/26/2020    Procedure: CYSTOSCOPY, WITH RETROGRADE PYELOGRAM;  Surgeon: Noman Rivas MD;  Location: [facility] 1ST FLR;  Service: Urology;  Laterality: Left;  30min    DE QUERVAIN'S RELEASE  1/8/2021    Procedure: RELEASE, HAND, FOR DEQUERVAIN'S TENOSYNOVITIS;  Surgeon: Marky Hagen MD;  Location: [facility];  Service: Orthopedics;;    EYE SURGERY Bilateral 2012    eye implants    GANGLION CYST EXCISION  11/13/2007    Right wrist    INJECTION OF ANESTHETIC AGENT AROUND MEDIAL BRANCH NERVES INNERVATING LUMBAR FACET JOINT Bilateral 4/5/2022    Procedure: Block-nerve-medial branch-lumbar bilateral L4-5 and L5-S1;  Surgeon: Alireza Meraz Jr., MD;  Location: [facility] PAIN MGT;  Service: Pain Management;  Laterality: Bilateral;  pt is diabetic   asa    INJECTION OF ANESTHETIC AGENT AROUND MEDIAL BRANCH NERVES INNERVATING LUMBAR FACET JOINT Bilateral 4/19/2022    Procedure: Block-nerve-medial branch-lumbar bilaterl L4-5 and L5-S1;  Surgeon: Alireza Meraz Jr., MD;  Location: [facility] PAIN MGT;  Service: Pain Management;  Laterality: Bilateral;  pt is diabetic     INJECTION OF FACET JOINT Bilateral 5/6/2021    Procedure: INJECTION, FACET JOINT--Bilateral L4-5, L5-S1;  Surgeon: Alireza Meraz Jr., MD;  Location: [facility] PAIN MGT;  Service: Pain Management;  Laterality: Bilateral;  Oral    INTERPOSITION ARTHROPLASTY OF CARPOMETACARPAL JOINTS Left 1/8/2021    Procedure: INTERPOSITION ARTHROPLASTY, CMC JOINT - left dequervains and cmc arthroplasty, arthrex;  Surgeon: Marky Hagen MD;  Location: [facility] OR;  Service: Orthopedics;  Laterality: Left;    JOINT REPLACEMENT Left     Pan Retinal Photocoagulation Bilateral     Dr. Monterroso (Proliferative Diabetic Retinopathy)    TOTAL ABDOMINAL HYSTERECTOMY  1982    TOTAL KNEE ARTHROPLASTY  2/2006     left    TRIGGER FINGER RELEASE  9/18/2009     Family History   Problem Relation Age of Onset    Diabetes Mother     Hypertension Mother     Heart disease Father     Diabetes Sister     Thyroid disease Brother     Diabetes Brother     Hypertension Brother     No Known Problems Daughter     No Known Problems Son     No Known Problems Daughter     Amblyopia Neg Hx     Blindness Neg Hx     Glaucoma Neg Hx     Macular degeneration Neg Hx     Retinal detachment Neg Hx     Strabismus Neg Hx     Colon cancer Neg Hx     Esophageal cancer Neg Hx      Social History     Tobacco Use    Smoking status: Never Smoker    Smokeless tobacco: Never Used   Substance Use Topics    Alcohol use: No    Drug use: No        Review of Systems:  ROS      OBJECTIVE:     Vital Signs (Most Recent):       Physical Exam:  General appearance - alert, well appearing, and in no distress  Mental status - AOx3  Eyes - pupils equal and reactive, extraocular eye movements intact  Heart - normal rate, regular rhythm, normal S1, S2, no murmurs, rubs, clicks or gallops  Chest - clear to auscultation, no wheezes, rales or rhonchi, symmetric air entry  Abdomen - soft, nontender, nondistended, no masses or organomegaly  Neurological - alert, oriented, normal speech, no focal findings or movement disorder noted  Extremities - peripheral pulses normal, no pedal edema, no clubbing or cyanosis      ASSESSMENT/PLAN:     There are no hospital problems to display for this patient.       The risks and benefits of this intervention, and alternative therapies were discussed with the patient.  The discussion of risks included infection, bleeding, need for additional procedures or surgery, nerve damage, paralysis, adverse medication reaction(s), stroke, and/or death.  Questions regarding the procedure, risks, expected outcome, and possible side effects were solicited and answered to the patient's satisfaction.  Annika wishes to proceed with the  injection.  Verbal and written consent were verified.      Proceed with intervention as scheduled.      Alireza Meraz Jr, MD  Interventional Pain Medicine / Anesthesiology

## 2022-05-12 NOTE — DISCHARGE SUMMARY
"[facility]  Discharge Note  Short Stay     Admit Date: 5/12/2022    Discharge Date: 5/12/2022     Attending Physician: Alireza Meraz Jr, MD    Diagnoses:  There are no hospital problems to display for this patient.    Discharged Condition: Good     Hospital Course: Patient was admitted for an outpatient interventional pain management procedure and tolerated the procedure well with no complications.     Final Diagnoses: Same as principal problem.     Disposition: Home or Self Care     Follow up/Patient Instructions:        Reconciled Medications:     Medication List      CONTINUE taking these medications    albuterol 90 mcg/actuation inhaler  Commonly known as: PROAIR HFA  Inhale 2 puffs into the lungs every 6 (six) hours as needed for Wheezing. Rescue     * allopurinoL 300 MG tablet  Commonly known as: ZYLOPRIM  Take 1 tablet (300 mg total) by mouth once daily.     * allopurinoL 100 MG tablet  Commonly known as: ZYLOPRIM  Take 1 tablet (100 mg total) by mouth once daily.     amLODIPine 10 MG tablet  Commonly known as: NORVASC  Take 1 tablet (10 mg total) by mouth once daily.     ammonium lactate 12 % Crea  Apply twice daily to affected parts both feet as needed.     aspirin 81 MG Chew  Take 1 tablet (81 mg total) by mouth once daily.     BD ULTRA-FINE KIKA PEN NEEDLE 32 gauge x 5/32" Ndle  Generic drug: pen needle, diabetic  To use 4 times daily     blood sugar diagnostic Strp  1 strip 4 times daily. Contour Next.     blood-glucose meter kit  1 each by Other route once daily. Use daily. Insurance proffered one touch  E11.42     chlorthalidone 25 MG Tab  Commonly known as: HYGROTEN  Take 1 tablet (25 mg total) by mouth once daily.     colchicine 0.6 mg tablet  Commonly known as: COLCRYS  TAKE 1 TABLET EVERY OTHER DAY     DULoxetine 30 MG capsule  Commonly known as: CYMBALTA  TAKE 1 CAPSULE BY MOUTH  ONCE DAILY     famotidine 20 MG tablet  Commonly known as: PEPCID  Take 1 tablet (20 mg total) by mouth " 2 (two) times daily.     ferrous sulfate Tab tablet  Commonly known as: FEOSOL  Take 1 tablet (1 each total) by mouth once daily.     GVOKE HYPOPEN 1-PACK 0.5 mg/0.1 mL Atin  Generic drug: glucagon  Inject 1 Dose into the skin daily as needed (for severe hypoglycemia if you aren't able to treat by ingesting sugar).     insulin lispro 100 unit/mL pen  Commonly known as: HumaLOG KwikPen Insulin  Inject 11 Units into the skin 3 (three) times daily before meals. Plus sliding scale - max TDD 63 units.     irbesartan 300 MG tablet  Commonly known as: AVAPRO  Take 1 tablet (300 mg total) by mouth every evening.     labetaloL 300 MG tablet  Commonly known as: NORMODYNE  Take 1 tablet (300 mg total) by mouth 2 (two) times daily.     lancets 31 gauge Misc  1 lancet by Misc.(Non-Drug; Combo Route) route 4 (four) times daily. E11.42 . Prescribed one touch     multivitamin per tablet  Commonly known as: THERAGRAN  Take 1 tablet by mouth once daily.     pregabalin 150 MG capsule  Commonly known as: LYRICA  Take 1 capsule (150 mg total) by mouth 2 (two) times daily.     semaglutide 1 mg/dose (4 mg/3 mL)  Commonly known as: OZEMPIC  Inject 1 mg into the skin every 7 days.     simvastatin 40 MG tablet  Commonly known as: ZOCOR  Take 1 tablet (40 mg total) by mouth every evening.     sodium bicarbonate 325 MG tablet  Take 2 tablets (650 mg total) by mouth 2 (two) times daily.     TRESIBA FLEXTOUCH U-100 100 unit/mL (3 mL) insulin pen  Generic drug: insulin degludec  Inject 35 Units into the skin once daily.         * This list has 2 medication(s) that are the same as other medications prescribed for you. Read the directions carefully, and ask your doctor or other care provider to review them with you.               Discharge Procedure Orders (must include Diet, Follow-up, Activity)   Diet Adult Regular     No driving until:   Order Comments: If you received sedation, no driving for 12 hrs     Remove dressing in 24 hours     Notify  your health care provider if you experience any of the following:  temperature >100.4     Notify your health care provider if you experience any of the following:  severe uncontrolled pain     Notify your health care provider if you experience any of the following:  redness, tenderness, or signs of infection (pain, swelling, redness, odor or green/yellow discharge around incision site)     Notify your health care provider if you experience any of the following:  difficulty breathing or increased cough     Notify your health care provider if you experience any of the following:  severe persistent headache     Notify your health care provider if you experience any of the following:  increased confusion or weakness     Activity as tolerated       Alireza Meraz Jr, MD  Interventional Pain Medicine / Anesthesiology

## 2022-05-12 NOTE — H&P
"[facility] - Pain Management (Hospital)  History & Physical - Short Stay  Pain Management           SUBJECTIVE:     Procedure: Procedure(s) (LRB):  RADIOFREQUENCY THERMAL COAGULATION RIGHT L4-5, L5-S1 (IV Sedation) (Right)    Chief Complaint/Reason for Admission:  Lumbar radiculopathy [M54.16]    PTA Medications   Medication Sig    albuterol (PROAIR HFA) 90 mcg/actuation inhaler Inhale 2 puffs into the lungs every 6 (six) hours as needed for Wheezing. Rescue    allopurinoL (ZYLOPRIM) 100 MG tablet Take 1 tablet (100 mg total) by mouth once daily.    allopurinoL (ZYLOPRIM) 300 MG tablet Take 1 tablet (300 mg total) by mouth once daily.    amLODIPine (NORVASC) 10 MG tablet Take 1 tablet (10 mg total) by mouth once daily.    ammonium lactate 12 % Crea Apply twice daily to affected parts both feet as needed.    aspirin 81 MG Chew Take 1 tablet (81 mg total) by mouth once daily.    BD ULTRA-FINE KIKA PEN NEEDLE 32 gauge x 5/32" Ndle To use 4 times daily    blood sugar diagnostic Strp 1 strip 4 times daily. Contour Next.    blood-glucose meter kit 1 each by Other route once daily. Use daily. Insurance proffered one touch  E11.42    chlorthalidone (HYGROTEN) 25 MG Tab Take 1 tablet (25 mg total) by mouth once daily.    colchicine (COLCRYS) 0.6 mg tablet TAKE 1 TABLET EVERY OTHER DAY    DULoxetine (CYMBALTA) 30 MG capsule TAKE 1 CAPSULE BY MOUTH  ONCE DAILY    famotidine (PEPCID) 20 MG tablet Take 1 tablet (20 mg total) by mouth 2 (two) times daily.    ferrous sulfate (FEOSOL) Tab tablet Take 1 tablet (1 each total) by mouth once daily.    glucagon (GVOKE HYPOPEN 1-PACK) 0.5 mg/0.1 mL AtIn Inject 1 Dose into the skin daily as needed (for severe hypoglycemia if you aren't able to treat by ingesting sugar).    insulin degludec (TRESIBA FLEXTOUCH U-100) 100 unit/mL (3 mL) insulin pen Inject 35 Units into the skin once daily.    insulin lispro (HUMALOG KWIKPEN INSULIN) 100 unit/mL pen Inject 11 Units into the skin " 3 (three) times daily before meals. Plus sliding scale - max TDD 63 units.    irbesartan (AVAPRO) 300 MG tablet Take 1 tablet (300 mg total) by mouth every evening.    labetaloL (NORMODYNE) 300 MG tablet Take 1 tablet (300 mg total) by mouth 2 (two) times daily.    lancets 31 gauge Misc 1 lancet by Misc.(Non-Drug; Combo Route) route 4 (four) times daily. E11.42 . Prescribed one touch    multivitamin (THERAGRAN) per tablet Take 1 tablet by mouth once daily.    pregabalin (LYRICA) 150 MG capsule Take 1 capsule (150 mg total) by mouth 2 (two) times daily.    semaglutide (OZEMPIC) 1 mg/dose (4 mg/3 mL) Inject 1 mg into the skin every 7 days.    simvastatin (ZOCOR) 40 MG tablet Take 1 tablet (40 mg total) by mouth every evening.    sodium bicarbonate 325 MG tablet Take 2 tablets (650 mg total) by mouth 2 (two) times daily.       Review of patient's allergies indicates:   Allergen Reactions    Clindamycin Hives and Swelling    Iodinated contrast media Rash       Past Medical History:   Diagnosis Date    Asthma     Chronic obstructive pulmonary disease, unspecified COPD type 2022    Constipation 10/3/2019    Deep vein thrombophlebitis of left leg 2012    Deep vein thrombosis     when she had a knee replacement    Encounter for blood transfusion     anemic     GERD (gastroesophageal reflux disease)     Obesity, diabetes, and hypertension syndrome 2019    Obstructive sleep apnea 2012    PAD (peripheral artery disease) 2013    Pressure ulcer of toe of left foot     Type 2 diabetes mellitus with obesity 2020    Type 2 diabetes mellitus with peripheral artery disease 2020     Past Surgical History:   Procedure Laterality Date    CATARACT EXTRACTION W/  INTRAOCULAR LENS IMPLANT Left 3/2002    left     CATARACT EXTRACTION W/  INTRAOCULAR LENS IMPLANT Right     OD dr. olivares     SECTION      COLONOSCOPY N/A 2018    Procedure: COLONOSCOPY;   Surgeon: Faizan Mac MD;  Location: [facility] ENDO (2ND FLR);  Service: Endoscopy;  Laterality: N/A;    CYST REMOVAL  2/11/2011    left side of face    CYSTOSCOPY W/ RETROGRADES Left 2/26/2020    Procedure: CYSTOSCOPY, WITH RETROGRADE PYELOGRAM;  Surgeon: Noman Rivas MD;  Location: [facility] 1ST FLR;  Service: Urology;  Laterality: Left;  30min    DE QUERVAIN'S RELEASE  1/8/2021    Procedure: RELEASE, HAND, FOR DEQUERVAIN'S TENOSYNOVITIS;  Surgeon: Marky Hagen MD;  Location: [facility];  Service: Orthopedics;;    EYE SURGERY Bilateral 2012    eye implants    GANGLION CYST EXCISION  11/13/2007    Right wrist    INJECTION OF ANESTHETIC AGENT AROUND MEDIAL BRANCH NERVES INNERVATING LUMBAR FACET JOINT Bilateral 4/5/2022    Procedure: Block-nerve-medial branch-lumbar bilateral L4-5 and L5-S1;  Surgeon: Alireza Meraz Jr., MD;  Location: [facility] PAIN MGT;  Service: Pain Management;  Laterality: Bilateral;  pt is diabetic   asa    INJECTION OF ANESTHETIC AGENT AROUND MEDIAL BRANCH NERVES INNERVATING LUMBAR FACET JOINT Bilateral 4/19/2022    Procedure: Block-nerve-medial branch-lumbar bilaterl L4-5 and L5-S1;  Surgeon: Alireza Meraz Jr., MD;  Location: [facility] PAIN MGT;  Service: Pain Management;  Laterality: Bilateral;  pt is diabetic     INJECTION OF FACET JOINT Bilateral 5/6/2021    Procedure: INJECTION, FACET JOINT--Bilateral L4-5, L5-S1;  Surgeon: Alireza Meraz Jr., MD;  Location: [facility] PAIN MGT;  Service: Pain Management;  Laterality: Bilateral;  Oral    INTERPOSITION ARTHROPLASTY OF CARPOMETACARPAL JOINTS Left 1/8/2021    Procedure: INTERPOSITION ARTHROPLASTY, CMC JOINT - left dequervains and cmc arthroplasty, arthrex;  Surgeon: Marky Hagen MD;  Location: [facility] OR;  Service: Orthopedics;  Laterality: Left;    JOINT REPLACEMENT Left     Pan Retinal Photocoagulation Bilateral     Dr. Monterroso (Proliferative Diabetic Retinopathy)    TOTAL ABDOMINAL HYSTERECTOMY  1982    TOTAL KNEE ARTHROPLASTY  2/2006     left    TRIGGER FINGER RELEASE  9/18/2009     Family History   Problem Relation Age of Onset    Diabetes Mother     Hypertension Mother     Heart disease Father     Diabetes Sister     Thyroid disease Brother     Diabetes Brother     Hypertension Brother     No Known Problems Daughter     No Known Problems Son     No Known Problems Daughter     Amblyopia Neg Hx     Blindness Neg Hx     Glaucoma Neg Hx     Macular degeneration Neg Hx     Retinal detachment Neg Hx     Strabismus Neg Hx     Colon cancer Neg Hx     Esophageal cancer Neg Hx      Social History     Tobacco Use    Smoking status: Never Smoker    Smokeless tobacco: Never Used   Substance Use Topics    Alcohol use: No    Drug use: No        Review of Systems:  ROS      OBJECTIVE:     Vital Signs (Most Recent):       Physical Exam:  General appearance - alert, well appearing, and in no distress  Mental status - AOx3  Eyes - pupils equal and reactive, extraocular eye movements intact  Heart - normal rate, regular rhythm, normal S1, S2, no murmurs, rubs, clicks or gallops  Chest - clear to auscultation, no wheezes, rales or rhonchi, symmetric air entry  Abdomen - soft, nontender, nondistended, no masses or organomegaly  Neurological - alert, oriented, normal speech, no focal findings or movement disorder noted  Extremities - peripheral pulses normal, no pedal edema, no clubbing or cyanosis      ASSESSMENT/PLAN:     There are no hospital problems to display for this patient.       The risks and benefits of this intervention, and alternative therapies were discussed with the patient.  The discussion of risks included infection, bleeding, need for additional procedures or surgery, nerve damage, paralysis, adverse medication reaction(s), stroke, and/or death.  Questions regarding the procedure, risks, expected outcome, and possible side effects were solicited and answered to the patient's satisfaction.  Annika wishes to proceed with the  injection.  Verbal and written consent were verified.      Proceed with intervention as scheduled.      Alireza Meraz Jr, MD  Interventional Pain Medicine / Anesthesiology

## 2022-05-13 ENCOUNTER — PATIENT OUTREACH (OUTPATIENT)
Dept: ADMINISTRATIVE | Facility: OTHER | Age: 72
End: 2022-05-13
Payer: MEDICARE

## 2022-05-13 NOTE — PROGRESS NOTES
Health Maintenance Due   Topic Date Due    COVID-19 Vaccine (4 - Booster for Moderna series) 04/29/2022     Updates were requested from care everywhere.  Chart was reviewed for overdue Proactive [facility] Encounters (KIRSTIN) topics (CRS, Breast Cancer Screening, Eye exam)  Health Maintenance has been updated.  LINKS immunization registry triggered.  Immunizations were reconciled.

## 2022-05-16 ENCOUNTER — OFFICE VISIT (OUTPATIENT)
Dept: PODIATRY | Facility: CLINIC | Age: 72
End: 2022-05-16
Payer: MEDICARE

## 2022-05-16 VITALS
SYSTOLIC BLOOD PRESSURE: 129 MMHG | WEIGHT: 280 LBS | BODY MASS INDEX: 46.65 KG/M2 | HEART RATE: 70 BPM | HEIGHT: 65 IN | DIASTOLIC BLOOD PRESSURE: 67 MMHG

## 2022-05-16 DIAGNOSIS — E11.42 DIABETIC POLYNEUROPATHY ASSOCIATED WITH TYPE 2 DIABETES MELLITUS: Primary | ICD-10-CM

## 2022-05-16 DIAGNOSIS — L60.9 DISEASE OF NAIL: ICD-10-CM

## 2022-05-16 PROCEDURE — 3074F SYST BP LT 130 MM HG: CPT | Mod: CPTII,S$GLB,, | Performed by: PODIATRIST

## 2022-05-16 PROCEDURE — 3052F HG A1C>EQUAL 8.0%<EQUAL 9.0%: CPT | Mod: CPTII,S$GLB,, | Performed by: PODIATRIST

## 2022-05-16 PROCEDURE — 3078F PR MOST RECENT DIASTOLIC BLOOD PRESSURE < 80 MM HG: ICD-10-PCS | Mod: CPTII,S$GLB,, | Performed by: PODIATRIST

## 2022-05-16 PROCEDURE — 3008F BODY MASS INDEX DOCD: CPT | Mod: CPTII,S$GLB,, | Performed by: PODIATRIST

## 2022-05-16 PROCEDURE — 11721 DEBRIDE NAIL 6 OR MORE: CPT | Mod: Q8,S$GLB,, | Performed by: PODIATRIST

## 2022-05-16 PROCEDURE — 1159F PR MEDICATION LIST DOCUMENTED IN MEDICAL RECORD: ICD-10-PCS | Mod: CPTII,S$GLB,, | Performed by: PODIATRIST

## 2022-05-16 PROCEDURE — 3066F NEPHROPATHY DOC TX: CPT | Mod: CPTII,S$GLB,, | Performed by: PODIATRIST

## 2022-05-16 PROCEDURE — 99499 NO LOS: ICD-10-PCS | Mod: S$GLB,,, | Performed by: PODIATRIST

## 2022-05-16 PROCEDURE — 3066F PR DOCUMENTATION OF TREATMENT FOR NEPHROPATHY: ICD-10-PCS | Mod: CPTII,S$GLB,, | Performed by: PODIATRIST

## 2022-05-16 PROCEDURE — 1159F MED LIST DOCD IN RCRD: CPT | Mod: CPTII,S$GLB,, | Performed by: PODIATRIST

## 2022-05-16 PROCEDURE — 11721 PR DEBRIDEMENT OF NAILS, 6 OR MORE: ICD-10-PCS | Mod: Q8,S$GLB,, | Performed by: PODIATRIST

## 2022-05-16 PROCEDURE — 3060F POS MICROALBUMINURIA REV: CPT | Mod: CPTII,S$GLB,, | Performed by: PODIATRIST

## 2022-05-16 PROCEDURE — 3074F PR MOST RECENT SYSTOLIC BLOOD PRESSURE < 130 MM HG: ICD-10-PCS | Mod: CPTII,S$GLB,, | Performed by: PODIATRIST

## 2022-05-16 PROCEDURE — 4010F ACE/ARB THERAPY RXD/TAKEN: CPT | Mod: CPTII,S$GLB,, | Performed by: PODIATRIST

## 2022-05-16 PROCEDURE — 3078F DIAST BP <80 MM HG: CPT | Mod: CPTII,S$GLB,, | Performed by: PODIATRIST

## 2022-05-16 PROCEDURE — 4010F PR ACE/ARB THEARPY RXD/TAKEN: ICD-10-PCS | Mod: CPTII,S$GLB,, | Performed by: PODIATRIST

## 2022-05-16 PROCEDURE — 99499 UNLISTED E&M SERVICE: CPT | Mod: S$GLB,,, | Performed by: PODIATRIST

## 2022-05-16 PROCEDURE — 99999 PR PBB SHADOW E&M-EST. PATIENT-LVL IV: CPT | Mod: PBBFAC,,, | Performed by: PODIATRIST

## 2022-05-16 PROCEDURE — 99999 PR PBB SHADOW E&M-EST. PATIENT-LVL IV: ICD-10-PCS | Mod: PBBFAC,,, | Performed by: PODIATRIST

## 2022-05-16 PROCEDURE — 1125F AMNT PAIN NOTED PAIN PRSNT: CPT | Mod: CPTII,S$GLB,, | Performed by: PODIATRIST

## 2022-05-16 PROCEDURE — 3052F PR MOST RECENT HEMOGLOBIN A1C LEVEL 8.0 - < 9.0%: ICD-10-PCS | Mod: CPTII,S$GLB,, | Performed by: PODIATRIST

## 2022-05-16 PROCEDURE — 3008F PR BODY MASS INDEX (BMI) DOCUMENTED: ICD-10-PCS | Mod: CPTII,S$GLB,, | Performed by: PODIATRIST

## 2022-05-16 PROCEDURE — 3060F PR POS MICROALBUMINURIA RESULT DOCUMENTED/REVIEW: ICD-10-PCS | Mod: CPTII,S$GLB,, | Performed by: PODIATRIST

## 2022-05-16 PROCEDURE — 1125F PR PAIN SEVERITY QUANTIFIED, PAIN PRESENT: ICD-10-PCS | Mod: CPTII,S$GLB,, | Performed by: PODIATRIST

## 2022-05-16 NOTE — PROGRESS NOTES
[facility]   Physical Therapy Treatment Note     Name: Annika Mac  [facility] Number: [id number]    Therapy Diagnosis:   Encounter Diagnosis   Name Primary?    Decreased range of motion of lumbar spine Yes     Physician: Evangelist Hamilton FNP    Visit Date: 5/17/2022    Physician Orders: PT Eval and Treat   Medical Diagnosis from Referral:   M47.816 (ICD-10-CM) - Lumbar spondylosis   M47.816 (ICD-10-CM) - Lumbar facet arthropathy   M51.36 (ICD-10-CM) - DDD (degenerative disc disease), lumbar   Evaluation Date: 4/12/2022  Authorization Period Expiration: 4/26/2022  Plan of Care Expiration: 7/12/2022  Visit # / Visits authorized: 5/ 12 (+eval)  PTA Visit #: 0     PN DUE: 6/17/2022    Precautions: Standard and Diabetes    Time In: 9:20 am  Time Out: 10:05 am  Total Billable Time: 45 minutes      SUBJECTIVE     Pt reports: her back is feeling better today.   She was compliant with home exercise program.  Response to previous treatment: none reported  Functional change: none reported     Pain: 4-5/10   Location: bilateral low back (L4-L5 level) occasional symptoms running into L LE    OBJECTIVE     Objective Measures updated at progress report unless specified.     Lumbar Range of Motion:     % of normal motion Pain   Flexion 100%           Extension 100%    Still slightly painful         Left Side Bending 100%           Right Side Bending 90%           Left rotation    100%           Right Rotation    90%                 Lower Extremity Strength  Right LE   Left LE     Knee extension: 5/5 Knee extension: 5/5   Knee flexion: 5/5 Knee flexion: 5/5   Hip flexion: 5/5 Hip flexion: 5/5   Hip abduction: 5/5 Hip abduction: 5/5   Hip adduction: +4/5 Hip adduction +4/5   Ankle dorsiflexion: 5/5 Ankle dorsiflexion: 5/5   Ankle plantarflexion: 5/5 Ankle plantarflexion: 5/5        TREATMENT   (exercises and techniques performed today are bolded)    Annika received the treatments listed below:      THERAPEUTIC  "EXERCISES to develop  strength, endurance, ROM, flexibility, posture and core stabilization for 30 minutes including:    Nustep for increased lower extremity range of motion, strength, and endurance x 5 minutes     Mat exercises performed with wedge and 1 pillow to elevate trunk      Hamstring stretch 5 x 15"  Hip adduction with ball 10 x 10"   Belt block 2 x 15 with 3" holds   LAQ 2 x 10 #3     PPT 2 x 10 with 5" holds   LTR x10 each way   PPT marches 2 x 20 +green theraband   Supine hip abduction 2 x 10 green theraband   SL clamshells 2 x 10 each   SL hip internal rotation 2 x 10     Seated posterior pelvic tilts 2 x 10   Seated thoracic rotations with wand x15 ea   Seated overhead raises with wand x15 for thoracic ext     +testing listed above     + mini squats     MANUAL THERAPY TECHNIQUES were applied  for 15 minutes including:     MFR of L lower lumbar paraspinals (multifidi), piriformis, TFL in L sidelying       NEUROMUSCULAR RE-EDUCATION ACTIVITIES to improve Balance, Coordination, Kinesthetic, Sense, Proprioception and Posture for 0 minutes.  The following were included:   + cone walking   + Side stepping 5 laps    + Heel-toe raises standing 2 x 30" each   + Marches on foam 3 x 30"        PATIENT EDUCATION AND HOME EXERCISES     Home Exercises Provided and Patient Education Provided     Education provided:   - educated pt that mild soreness is an expected outcome to therapy session  - educated pt to continue with HEP as issued and prescribed    Written Home Exercises Provided: Patient instructed to cont prior HEP. Exercises were reviewed and Annika was able to demonstrate them prior to the end of the session.  Annika demonstrated good  understanding of the education provided. See EMR under Patient Instructions for exercises provided during therapy sessions    ASSESSMENT     Pt presents with decreased low back pain, increased lumbar spine range of motion, and increased lower extremity strength. Patient's FOTO " score does not match her subjective report. Patient is progressing well with therapy and has met 2 out of 6 goals. Plan to progress to more standing and functional exercises next visit.     Annika Is progressing well towards her goals.   Pt prognosis is Fair.     Pt will continue to benefit from skilled outpatient physical therapy to address the deficits listed in the problem list box on initial evaluation, provide pt/family education and to maximize pt's level of independence in the home and community environment.     Pt's spiritual, cultural and educational needs considered and pt agreeable to plan of care and goals.     Anticipated barriers to physical therapy: chronic pain and co-morbidities     Goals:   Short Term Goals:  4 weeks   1. Pain: Pt will demonstrate improved pain by reports of less than or equal to 5/10 worst pain on the verbal rating scale in order to progress toward maximal functional ability and improve QOL. MET 5/17/2022   2.  HEP: Patient will demonstrate independence with HEP in order to progress toward functional independence. Met 5/2/2022      Long Term Goals:  8 weeks   1. Function: Patient will demonstrate improved function as indicated by a functional limitation score of 37% on the FOTO. PC   2. Mobility: Patient will present with full lumbar active range of motion in order to return to maximal functional potential and improve quality of life. PM 5/17/2022   3. Strength: Patient will improve strength to 5/5 in bilateral lower extremities in order to improve functional independence and quality of life. PC   4. Patient will return to normal ADL's, community involvement, and recreational activities with no pain and maximal function. PC      Goals Key:  PC= progressing/continue;        PM= partially met;             DC= discontinue        PLAN     Continue PT per POC, assess tolerance to today's session,  progress exercise as tolerated and appropriate      Liz Robin, PT, DPT, PPCES    5/17/2022

## 2022-05-17 ENCOUNTER — CLINICAL SUPPORT (OUTPATIENT)
Dept: REHABILITATION | Facility: HOSPITAL | Age: 72
End: 2022-05-17
Payer: MEDICARE

## 2022-05-17 DIAGNOSIS — M53.86 DECREASED RANGE OF MOTION OF LUMBAR SPINE: Primary | ICD-10-CM

## 2022-05-17 PROCEDURE — 97110 THERAPEUTIC EXERCISES: CPT | Mod: PN

## 2022-05-17 PROCEDURE — 97140 MANUAL THERAPY 1/> REGIONS: CPT | Mod: PN

## 2022-05-17 NOTE — PROGRESS NOTES
Subjective:      Patient ID: Annika Mac is a 71 y.o. female.    Chief Complaint: Diabetes Mellitus (ENDO - [facility] - 4/18/22), Chronic Kidney Disease, Nail Care, and Ankle Pain (Both ankles)    Annika is a 71 y.o. female who presents to the clinic for evaluation and treatment of high risk feet. Annika has a past medical history of Asthma, Chronic obstructive pulmonary disease, unspecified COPD type (1/14/2022), Constipation (10/3/2019), Deep vein thrombophlebitis of left leg (7/27/2012), Deep vein thrombosis, Encounter for blood transfusion, GERD (gastroesophageal reflux disease), Obesity, diabetes, and hypertension syndrome (2/13/2019), Obstructive sleep apnea (7/27/2012), PAD (peripheral artery disease) (11/21/2013), Pressure ulcer of toe of left foot, Type 2 diabetes mellitus with obesity (8/19/2020), and Type 2 diabetes mellitus with peripheral artery disease (8/19/2020). The patient's chief complaint is long, thick toenails. This patient has documented high risk feet requiring routine maintenance secondary to peripheral neuropathy.    PCP: Mamie Cotton MD    Date Last Seen by PCP:   Chief Complaint   Patient presents with    Diabetes Mellitus     ENDO - [facility] - 4/18/22    Chronic Kidney Disease    Nail Care    Ankle Pain     Both ankles         Current shoe gear:  Affected Foot: Slip-on shoes     Unaffected Foot: Slip-on shoes    Hemoglobin A1C   Date Value Ref Range Status   04/11/2022 8.1 (H) 4.0 - 5.6 % Final     Comment:     ADA Screening Guidelines:  5.7-6.4%  Consistent with prediabetes  >or=6.5%  Consistent with diabetes    High levels of fetal hemoglobin interfere with the HbA1C  assay. Heterozygous hemoglobin variants (HbS, HgC, etc)do  not significantly interfere with this assay.   However, presence of multiple variants may affect accuracy.     01/07/2022 6.6 (H) 4.0 - 5.6 % Final     Comment:     ADA Screening Guidelines:  5.7-6.4%  Consistent with prediabetes  >or=6.5%  Consistent with  diabetes    High levels of fetal hemoglobin interfere with the HbA1C  assay. Heterozygous hemoglobin variants (HbS, HgC, etc)do  not significantly interfere with this assay.   However, presence of multiple variants may affect accuracy.     11/08/2021 7.1 (H) 4.0 - 5.6 % Final     Comment:     ADA Screening Guidelines:  5.7-6.4%  Consistent with prediabetes  >or=6.5%  Consistent with diabetes    High levels of fetal hemoglobin interfere with the HbA1C  assay. Heterozygous hemoglobin variants (HbS, HgC, etc)do  not significantly interfere with this assay.   However, presence of multiple variants may affect accuracy.         Review of Systems   Constitutional: Negative for chills, fever and malaise/fatigue.   HENT: Negative for hearing loss.    Cardiovascular: Negative for claudication.   Respiratory: Negative for shortness of breath.    Skin: Positive for color change, dry skin, nail changes and unusual hair distribution. Negative for flushing and rash.   Musculoskeletal: Negative for joint pain and myalgias.   Neurological: Positive for numbness, paresthesias and sensory change. Negative for loss of balance.   Psychiatric/Behavioral: Negative for altered mental status.           Objective:      Physical Exam  Vitals reviewed.   Constitutional:       Appearance: She is well-developed.   Cardiovascular:      Pulses:           Dorsalis pedis pulses are 0 on the right side and 0 on the left side.        Posterior tibial pulses are 1+ on the right side and 1+ on the left side.   Musculoskeletal:      Right ankle: Decreased range of motion. Abnormal pulse.      Right Achilles Tendon: Patel's test negative.      Left ankle: Decreased range of motion. Abnormal pulse.      Left Achilles Tendon: Patel's test negative.      Right foot: Decreased range of motion.      Left foot: Decreased range of motion.   Feet:      Right foot:      Protective Sensation: 5 sites tested. 2 sites sensed.      Left foot:      Protective  Sensation: 5 sites tested. 2 sites sensed.   Skin:     General: Skin is dry.      Capillary Refill: Capillary refill takes more than 3 seconds.      Comments: Nails x10 are elongated by  4-6mm's, thickened by 2-3 mm's, dystrophic, and are yellowish in  coloration . Xerosis Bilaterally. No open lesions noted.   No wound on left 2nd digit   Neurological:      Mental Status: She is alert.      Comments: diminished sensation noted to b/L lower extremities   Psychiatric:         Behavior: Behavior normal. Behavior is cooperative.               Assessment:       Encounter Diagnoses   Name Primary?    Diabetic polyneuropathy associated with type 2 diabetes mellitus Yes    Disease of nail          Plan:       Annika was seen today for diabetes mellitus, chronic kidney disease, nail care and ankle pain.    Diagnoses and all orders for this visit:    Diabetic polyneuropathy associated with type 2 diabetes mellitus    Disease of nail      I counseled the patient on her conditions, their implications and medical management.        - Shoe inspection. Diabetic Foot Education. Patient reminded of the importance of good nutrition and blood sugar control to help prevent podiatric complications of diabetes. Patient instructed on proper foot hygeine. We discussed wearing proper shoe gear, daily foot inspections, never walking without protective shoe gear, never putting sharp instruments to feet, routine podiatric nail visits every 2-3 months.      - With patient's permission, nails were aggressively reduced and debrided x 10 to their soft tissue attachment mechanically and with electric , removing all offending nail and debris. Patient relates relief following the procedure. She will continue to monitor the areas daily, inspect her feet, wear protective shoe gear when ambulatory, moisturizer to maintain skin integrity and follow in this office in approximately 2-3 months, sooner p.r.n.

## 2022-05-18 ENCOUNTER — TELEPHONE (OUTPATIENT)
Dept: PAIN MEDICINE | Facility: CLINIC | Age: 72
End: 2022-05-18
Payer: MEDICARE

## 2022-05-18 ENCOUNTER — TELEPHONE (OUTPATIENT)
Dept: INTERNAL MEDICINE | Facility: CLINIC | Age: 72
End: 2022-05-18
Payer: MEDICARE

## 2022-05-18 DIAGNOSIS — Z74.09 MOBILITY IMPAIRED: ICD-10-CM

## 2022-05-18 DIAGNOSIS — M51.36 DDD (DEGENERATIVE DISC DISEASE), LUMBAR: Primary | ICD-10-CM

## 2022-05-18 NOTE — TELEPHONE ENCOUNTER
Pt call and  states she has been having problems with her legs and back pt is requesting a motor scooter pt states her insurance is with [employer] and [facility] pt states that her PCP knows of her condition for her to qualify for scooter

## 2022-05-18 NOTE — TELEPHONE ENCOUNTER
Pt would need mobility evaluation  To see what she would qualify for   Order placed to physical medicine  She should call to [facility] a wheelchair/mobility evaluation to see what they recommend you can give her number

## 2022-05-18 NOTE — DISCHARGE INSTRUCTIONS
Home Care Instructions Pain Management:    1.  DIET:    You may resume your normal diet today.    2.  BATHING:    You may shower with luke warm water.    3.  DRESSING:    You may remove your bandage today.    4.  ACTIVITY LEVEL:      You may resume your normal activities 24 hours after your procedure.    5.  MEDICATIONS:    You may resume your normal medications today.    6.  SPECIAL INSTRUCTIONS:    No heat to the injection site for 24 hours including bath or shower, heating pad, moist heat or hot tubs.    Use an ice pack to the injection site for any pain or discomfort.  Apply ice packs for 20 minute intervals as needed.    If you have received any sedatives by mouth today, you can not drive for 12 hours.    If you have received sedation through an IV, you can not drive for 24 hours.    PLEASE CALL YOUR DOCTOR FOR THE FOLLOWIN.  Redness or swelling around the injection site.  2.  Fever of 101 degrees.  3.  Drainage (pus) from the injection site.  4.  For any continuous bleeding (some dried blood over the incision is normal.)    FOR EMERGENCIES:    If any unusual problems or difficulties occur during clinic hours, call [phone] or dial [phone].    Follow up with with your physician in 2-3 weeks.

## 2022-05-18 NOTE — TELEPHONE ENCOUNTER
Gave pt Dr Cotton advice on getting evaluated for scooter with physical medicine assist pt with contact information to physical medicine

## 2022-05-18 NOTE — TELEPHONE ENCOUNTER
----- Message from Tonia Villar sent at 5/18/2022  2:06 PM CDT -----  Contact: [phone]  Patient is returning a phone call.  Who left a message for the patient: Dr Cotton's office  Does patient know what this is regarding:  yes  Would you like a call back, or a response through your [facility] portal?:  phone  Comments:

## 2022-05-19 ENCOUNTER — HOSPITAL ENCOUNTER (OUTPATIENT)
Facility: HOSPITAL | Age: 72
Discharge: HOME | End: 2022-05-19
Attending: PAIN MEDICINE | Admitting: PAIN MEDICINE
Payer: MEDICARE

## 2022-05-19 VITALS
RESPIRATION RATE: 14 BRPM | TEMPERATURE: 98 F | BODY MASS INDEX: 46.65 KG/M2 | OXYGEN SATURATION: 98 % | DIASTOLIC BLOOD PRESSURE: 70 MMHG | HEART RATE: 61 BPM | HEIGHT: 65 IN | SYSTOLIC BLOOD PRESSURE: 140 MMHG | WEIGHT: 280 LBS

## 2022-05-19 DIAGNOSIS — G89.29 CHRONIC PAIN: ICD-10-CM

## 2022-05-19 DIAGNOSIS — M47.816 LUMBAR FACET ARTHROPATHY: Primary | ICD-10-CM

## 2022-05-19 LAB — POCT GLUCOSE: 121 MG/DL (ref 70–110)

## 2022-05-19 PROCEDURE — 64636 DESTROY L/S FACET JNT ADDL: CPT | Mod: LT,,, | Performed by: PAIN MEDICINE

## 2022-05-19 PROCEDURE — 63600175 PHARM REV CODE 636 W HCPCS: Performed by: PAIN MEDICINE

## 2022-05-19 PROCEDURE — 64636 PR DESTROY L/S FACET JNT ADDL: ICD-10-PCS | Mod: LT,,, | Performed by: PAIN MEDICINE

## 2022-05-19 PROCEDURE — 64636 DESTROY L/S FACET JNT ADDL: CPT | Performed by: PAIN MEDICINE

## 2022-05-19 PROCEDURE — 64635 PR DESTROY LUMB/SAC FACET JNT: ICD-10-PCS | Mod: LT,,, | Performed by: PAIN MEDICINE

## 2022-05-19 PROCEDURE — 99152 MOD SED SAME PHYS/QHP 5/>YRS: CPT | Performed by: PAIN MEDICINE

## 2022-05-19 PROCEDURE — 64635 DESTROY LUMB/SAC FACET JNT: CPT | Performed by: PAIN MEDICINE

## 2022-05-19 PROCEDURE — 25000003 PHARM REV CODE 250: Performed by: ANESTHESIOLOGY

## 2022-05-19 PROCEDURE — 64635 DESTROY LUMB/SAC FACET JNT: CPT | Mod: LT,,, | Performed by: PAIN MEDICINE

## 2022-05-19 PROCEDURE — 25000003 PHARM REV CODE 250: Performed by: PAIN MEDICINE

## 2022-05-19 RX ORDER — LIDOCAINE HYDROCHLORIDE 10 MG/ML
1 INJECTION, SOLUTION EPIDURAL; INFILTRATION; INTRACAUDAL; PERINEURAL ONCE
Status: DISCONTINUED | OUTPATIENT
Start: 2022-05-19 | End: 2022-05-19 | Stop reason: HOSPADM

## 2022-05-19 RX ORDER — MIDAZOLAM HYDROCHLORIDE 1 MG/ML
INJECTION, SOLUTION INTRAMUSCULAR; INTRAVENOUS
Status: DISCONTINUED | OUTPATIENT
Start: 2022-05-19 | End: 2022-05-19 | Stop reason: HOSPADM

## 2022-05-19 RX ORDER — LIDOCAINE HYDROCHLORIDE 10 MG/ML
INJECTION INFILTRATION; PERINEURAL
Status: DISCONTINUED | OUTPATIENT
Start: 2022-05-19 | End: 2022-05-19 | Stop reason: HOSPADM

## 2022-05-19 RX ORDER — FENTANYL CITRATE 50 UG/ML
INJECTION, SOLUTION INTRAMUSCULAR; INTRAVENOUS
Status: DISCONTINUED | OUTPATIENT
Start: 2022-05-19 | End: 2022-05-19 | Stop reason: HOSPADM

## 2022-05-19 RX ORDER — SODIUM CHLORIDE 9 MG/ML
500 INJECTION, SOLUTION INTRAVENOUS CONTINUOUS
Status: DISCONTINUED | OUTPATIENT
Start: 2022-05-19 | End: 2022-05-19 | Stop reason: HOSPADM

## 2022-05-19 RX ORDER — LIDOCAINE HYDROCHLORIDE 20 MG/ML
INJECTION, SOLUTION EPIDURAL; INFILTRATION; INTRACAUDAL; PERINEURAL
Status: DISCONTINUED | OUTPATIENT
Start: 2022-05-19 | End: 2022-05-19 | Stop reason: HOSPADM

## 2022-05-19 RX ORDER — BUPIVACAINE HYDROCHLORIDE 2.5 MG/ML
INJECTION, SOLUTION EPIDURAL; INFILTRATION; INTRACAUDAL
Status: DISCONTINUED | OUTPATIENT
Start: 2022-05-19 | End: 2022-05-19 | Stop reason: HOSPADM

## 2022-05-19 RX ORDER — METHYLPREDNISOLONE ACETATE 40 MG/ML
INJECTION, SUSPENSION INTRA-ARTICULAR; INTRALESIONAL; INTRAMUSCULAR; SOFT TISSUE
Status: DISCONTINUED | OUTPATIENT
Start: 2022-05-19 | End: 2022-05-19 | Stop reason: HOSPADM

## 2022-05-19 RX ORDER — SODIUM BICARBONATE 1 MEQ/ML
SYRINGE (ML) INTRAVENOUS
Status: DISCONTINUED | OUTPATIENT
Start: 2022-05-19 | End: 2022-05-19 | Stop reason: HOSPADM

## 2022-05-19 RX ADMIN — SODIUM CHLORIDE 500 ML: 0.9 INJECTION, SOLUTION INTRAVENOUS at 07:05

## 2022-05-19 NOTE — DISCHARGE SUMMARY
"[facility]  Discharge Note  Short Stay     Admit Date: 5/19/2022    Discharge Date: 5/19/2022     Attending Physician: Alireza Meraz Jr, MD    Diagnoses:  There are no hospital problems to display for this patient.    Discharged Condition: Good     Hospital Course: Patient was admitted for an outpatient interventional pain management procedure and tolerated the procedure well with no complications.     Final Diagnoses: Same as principal problem.     Disposition: Home or Self Care     Follow up/Patient Instructions:        Reconciled Medications:     Medication List      CONTINUE taking these medications    albuterol 90 mcg/actuation inhaler  Commonly known as: PROAIR HFA  Inhale 2 puffs into the lungs every 6 (six) hours as needed for Wheezing. Rescue     allopurinoL 300 MG tablet  Commonly known as: ZYLOPRIM  Take 1 tablet (300 mg total) by mouth once daily.     amLODIPine 10 MG tablet  Commonly known as: NORVASC  Take 1 tablet (10 mg total) by mouth once daily.     ammonium lactate 12 % Crea  Apply twice daily to affected parts both feet as needed.     aspirin 81 MG Chew  Take 1 tablet (81 mg total) by mouth once daily.     BD ULTRA-FINE KIKA PEN NEEDLE 32 gauge x 5/32" Ndle  Generic drug: pen needle, diabetic  To use 4 times daily     blood sugar diagnostic Strp  1 strip 4 times daily. Contour Next.     blood-glucose meter kit  1 each by Other route once daily. Use daily. Insurance proffered one touch  E11.42     chlorthalidone 25 MG Tab  Commonly known as: HYGROTEN  Take 1 tablet (25 mg total) by mouth once daily.     colchicine 0.6 mg tablet  Commonly known as: COLCRYS  TAKE 1 TABLET EVERY OTHER DAY     DULoxetine 30 MG capsule  Commonly known as: CYMBALTA  TAKE 1 CAPSULE BY MOUTH  ONCE DAILY     famotidine 20 MG tablet  Commonly known as: PEPCID  Take 1 tablet (20 mg total) by mouth 2 (two) times daily.     ferrous sulfate Tab tablet  Commonly known as: FEOSOL  Take 1 tablet (1 each total) by " mouth once daily.     GVOKE HYPOPEN 1-PACK 0.5 mg/0.1 mL Atin  Generic drug: glucagon  Inject 1 Dose into the skin daily as needed (for severe hypoglycemia if you aren't able to treat by ingesting sugar).     insulin lispro 100 unit/mL pen  Commonly known as: HumaLOG KwikPen Insulin  Inject 11 Units into the skin 3 (three) times daily before meals. Plus sliding scale - max TDD 63 units.     irbesartan 300 MG tablet  Commonly known as: AVAPRO  Take 1 tablet (300 mg total) by mouth every evening.     labetaloL 300 MG tablet  Commonly known as: NORMODYNE  Take 1 tablet (300 mg total) by mouth 2 (two) times daily.     lancets 31 gauge Misc  1 lancet by Misc.(Non-Drug; Combo Route) route 4 (four) times daily. E11.42 . Prescribed one touch     multivitamin per tablet  Commonly known as: THERAGRAN  Take 1 tablet by mouth once daily.     pregabalin 150 MG capsule  Commonly known as: LYRICA  Take 1 capsule (150 mg total) by mouth 2 (two) times daily.     semaglutide 1 mg/dose (4 mg/3 mL)  Commonly known as: OZEMPIC  Inject 1 mg into the skin every 7 days.     simvastatin 40 MG tablet  Commonly known as: ZOCOR  Take 1 tablet (40 mg total) by mouth every evening.     sodium bicarbonate 325 MG tablet  Take 2 tablets (650 mg total) by mouth 2 (two) times daily.     TRESIBA FLEXTOUCH U-100 100 unit/mL (3 mL) insulin pen  Generic drug: insulin degludec  Inject 35 Units into the skin once daily.           Discharge Procedure Orders (must include Diet, Follow-up, Activity)   Diet Adult Regular     No driving until:   Order Comments: If you received sedation, no driving for 12 hrs     Remove dressing in 24 hours     Notify your health care provider if you experience any of the following:  temperature >100.4     Notify your health care provider if you experience any of the following:  severe uncontrolled pain     Notify your health care provider if you experience any of the following:  redness, tenderness, or signs of infection (pain,  swelling, redness, odor or green/yellow discharge around incision site)     Notify your health care provider if you experience any of the following:  difficulty breathing or increased cough     Notify your health care provider if you experience any of the following:  severe persistent headache     Notify your health care provider if you experience any of the following:  increased confusion or weakness     Activity as tolerated       Alireza Meraz Jr, MD  Interventional Pain Medicine / Anesthesiology

## 2022-05-19 NOTE — OP NOTE
Procedure Note    Preoperative Diagnosis: Lumbar Spondylosis  Postoperative Diagnosis: Lumbar Spondylosis  Procedure Date: 05/19/2022  Procedure Title:  (1) Left L4-5 and L5-S1 Lumbar Medial Branch Radiofrequency Ablation (2 levels)     (2) Intraoperative Fluoroscopy     (3) IV Moderate Sedation (Midazolam 2 mg, Fentanyl 50 mcg)    Anesthesia: Local    Indications: Annika Mac is a 71 y.o. year-old female with a diagnosis of back pain due to lumbar or lumbosacral spondylosis.  The patient had significant relief of the pain with the previous diagnostic nerve blocks.     Findings: Final needle placement consistent with technically sucsessful procedure.     Procedure in Detail: The patients history and physical exam were reviewed.  Informed consent obtained after explaining the procedure and potential complications including local discomfort, infection, headache, temporary or permanent weakness and/or numbness of one or both legs, temporary or permanent paraplegia, heart attack and stroke. The patient agreed to proceed, and written informed consent was obtained.    Patient was brought back to procedure room and placed in a prone position and head resting comfortably in head ring. Prior to the initiation of the procedure, the patient's identity, the site, and the nature of the procedure were verified.  AP and oblique fluoroscopy were used to identify and trina the junctions between the superior articular processes and transverse processes at the L4 and L5 levels on the Left side.  The Left sacral ala was also identified and marked.  The skin and subcutaneous tissues in these identified areas was anesthetized with 2-3 mL of lidocaine 1% at each level. An insulated 18 gauge styletted needle (100 mm or 150 mm) was advanced towards each of these points under fluoroscopic guidance. Once os was contacted, negative aspiration was confirmed. Needle placement was optimized in lateral fluoroscopic views.  Stylettes were removed  and probes inserted.    Sensory stimulation was performed at 50 Hz & 0.4V, generating a pressure sensation. Motor stimulation at 2 Hz & 2 V was negative for muscle contractions in the above mentioned nerve distributions except for local multifidus twitch. One mL of 2% lidocaine was injected at each level prior to lesioning, which was performed for 90 seconds at 80 degrees Centigrade. Once the lesion was complete, needles were withdrawn slight and rotated 180 degrees.  A Second lesion was performed.    One mL from a mixture consisting of 2 mL 0.25% bupivacaine and 40 mg Depomedrol was injected through each probe. The probes were removed with a 1% lidocaine flush.      After the procedure was completed, the patients back was cleaned and bandages were placed at the needle insertion sites.    Estimated blood loss: None    Complications: None     Disposition:  The patient tolerated the procedure well and there were no apparent complications. Vital signs remained stable throughout the procedure. The patient was taken to the recovery area where written discharge instructions for the procedure were given.     Follow-up: RTC as scheduled for clinic eval or next procedure.    Alireza Meraz Jr, MD  Interventional Pain Medicine / Anesthesiology

## 2022-05-24 ENCOUNTER — OFFICE VISIT (OUTPATIENT)
Dept: OPTOMETRY | Facility: CLINIC | Age: 72
End: 2022-05-24
Payer: MEDICARE

## 2022-05-24 DIAGNOSIS — Z96.1 PSEUDOPHAKIA OF BOTH EYES: ICD-10-CM

## 2022-05-24 DIAGNOSIS — H52.203 ASTIGMATISM OF BOTH EYES, UNSPECIFIED TYPE: ICD-10-CM

## 2022-05-24 DIAGNOSIS — Z98.42 S/P BILATERAL CATARACT EXTRACTION: ICD-10-CM

## 2022-05-24 DIAGNOSIS — Z98.41 S/P BILATERAL CATARACT EXTRACTION: ICD-10-CM

## 2022-05-24 DIAGNOSIS — E11.3593 PROLIFERATIVE DIABETIC RETINOPATHY OF BOTH EYES ASSOCIATED WITH TYPE 2 DIABETES MELLITUS, UNSPECIFIED PROLIFERATIVE RETINOPATHY TYPE: Primary | ICD-10-CM

## 2022-05-24 PROCEDURE — 92015 DETERMINE REFRACTIVE STATE: CPT | Mod: S$GLB,,, | Performed by: OPTOMETRIST

## 2022-05-24 PROCEDURE — 4010F PR ACE/ARB THEARPY RXD/TAKEN: ICD-10-PCS | Mod: CPTII,S$GLB,, | Performed by: OPTOMETRIST

## 2022-05-24 PROCEDURE — 3288F FALL RISK ASSESSMENT DOCD: CPT | Mod: CPTII,S$GLB,, | Performed by: OPTOMETRIST

## 2022-05-24 PROCEDURE — 3066F NEPHROPATHY DOC TX: CPT | Mod: CPTII,S$GLB,, | Performed by: OPTOMETRIST

## 2022-05-24 PROCEDURE — 3060F PR POS MICROALBUMINURIA RESULT DOCUMENTED/REVIEW: ICD-10-PCS | Mod: CPTII,S$GLB,, | Performed by: OPTOMETRIST

## 2022-05-24 PROCEDURE — 92012 PR EYE EXAM, EST PATIENT,INTERMED: ICD-10-PCS | Mod: S$GLB,,, | Performed by: OPTOMETRIST

## 2022-05-24 PROCEDURE — 99999 PR PBB SHADOW E&M-EST. PATIENT-LVL IV: CPT | Mod: PBBFAC,,, | Performed by: OPTOMETRIST

## 2022-05-24 PROCEDURE — 3052F HG A1C>EQUAL 8.0%<EQUAL 9.0%: CPT | Mod: CPTII,S$GLB,, | Performed by: OPTOMETRIST

## 2022-05-24 PROCEDURE — 1101F PR PT FALLS ASSESS DOC 0-1 FALLS W/OUT INJ PAST YR: ICD-10-PCS | Mod: CPTII,S$GLB,, | Performed by: OPTOMETRIST

## 2022-05-24 PROCEDURE — 1159F PR MEDICATION LIST DOCUMENTED IN MEDICAL RECORD: ICD-10-PCS | Mod: CPTII,S$GLB,, | Performed by: OPTOMETRIST

## 2022-05-24 PROCEDURE — 1159F MED LIST DOCD IN RCRD: CPT | Mod: CPTII,S$GLB,, | Performed by: OPTOMETRIST

## 2022-05-24 PROCEDURE — 3288F PR FALLS RISK ASSESSMENT DOCUMENTED: ICD-10-PCS | Mod: CPTII,S$GLB,, | Performed by: OPTOMETRIST

## 2022-05-24 PROCEDURE — 3066F PR DOCUMENTATION OF TREATMENT FOR NEPHROPATHY: ICD-10-PCS | Mod: CPTII,S$GLB,, | Performed by: OPTOMETRIST

## 2022-05-24 PROCEDURE — 92012 INTRM OPH EXAM EST PATIENT: CPT | Mod: S$GLB,,, | Performed by: OPTOMETRIST

## 2022-05-24 PROCEDURE — 4010F ACE/ARB THERAPY RXD/TAKEN: CPT | Mod: CPTII,S$GLB,, | Performed by: OPTOMETRIST

## 2022-05-24 PROCEDURE — 1101F PT FALLS ASSESS-DOCD LE1/YR: CPT | Mod: CPTII,S$GLB,, | Performed by: OPTOMETRIST

## 2022-05-24 PROCEDURE — 3052F PR MOST RECENT HEMOGLOBIN A1C LEVEL 8.0 - < 9.0%: ICD-10-PCS | Mod: CPTII,S$GLB,, | Performed by: OPTOMETRIST

## 2022-05-24 PROCEDURE — 92015 PR REFRACTION: ICD-10-PCS | Mod: S$GLB,,, | Performed by: OPTOMETRIST

## 2022-05-24 PROCEDURE — 3060F POS MICROALBUMINURIA REV: CPT | Mod: CPTII,S$GLB,, | Performed by: OPTOMETRIST

## 2022-05-24 PROCEDURE — 1126F AMNT PAIN NOTED NONE PRSNT: CPT | Mod: CPTII,S$GLB,, | Performed by: OPTOMETRIST

## 2022-05-24 PROCEDURE — 1126F PR PAIN SEVERITY QUANTIFIED, NO PAIN PRESENT: ICD-10-PCS | Mod: CPTII,S$GLB,, | Performed by: OPTOMETRIST

## 2022-05-24 PROCEDURE — 99999 PR PBB SHADOW E&M-EST. PATIENT-LVL IV: ICD-10-PCS | Mod: PBBFAC,,, | Performed by: OPTOMETRIST

## 2022-05-24 NOTE — PROGRESS NOTES
"HPI     refraction       Additional comments: In for refraction and new spectacle lens Rx.  Saw Dr. Maldonado on 03/25/2022 - followed for bilateral proliferative   diabetic retinopathy without macular edema.  Wears glasses "most of the time, and especially for reading"  States [facility] with glasses "very good"               Comments     Patient's age: 71 y.o. AA female  Occupation: retired   Approximate date of last eye examination:  03/25/2022 Dr. Maldonado   Saw   on 12/20/2019  City/State: [facility]   Wears glasses? Yes      If yes, wears  Full-time or part-time?  Part-time   Present glasses are: Bifocal, SV Distance, SV Reading?  Reading   Approximate age of present glasses:  2 years    Got new glasses following last exam, or subsequently?:  yes   Any problem with [facility] with glasses?  no  Wears CLs?:  no  Headaches?  no  Eye pain/discomfort?  no                                                                                     Flashes?  no  Floaters?  yes  Diplopia/Double vision?  no  Patient's Ocular History:         Any eye surgeries? Cataract surgery OU x 9years ago +/-         Any eye injury?  no         Any treatment for eye disease?  no  Family history of eye disease?  no  Significant patient medical history:         1. Diabetes?  Yes   this morning  Hemoglobin A1C       Date                     Value               Ref Range             Status                04/11/2022               8.1 (H)             4.0 - 5.6 %           Final                 ----------         If yes, IDDM or NIDDM?   IDDM   2. HBP?  Yes controlled with medication                  ! OTC eyedrops currently using:  no   ! Prescription eye meds currently using:  no   ! Any history of allergy/adverse reaction to any eye meds used   previously?  no    ! Any history of allergy/adverse reaction to eyedrops used during prior   eye exam(s)? no    ! Any history of allergy/adverse reaction to Novacaine or similar meds?   no   ! Any history of " "allergy/adverse reaction to Epinephrine or similar meds?   no    ! Patient okay with use of anesthetic eyedrops to check eye pressure?    yes         ! Patient okay with use of eyedrops to dilate pupils today?  Yes, if   needed, but DFE done 03.25/2022   !  Allergies/Medications/Medical History/Family History reviewed today?    yes      PD =   68/64  Desired reading distance =  17.5"                                                                        Last edited by Umer Ybarra, OD on 5/24/2022  8:39 AM. (History)            Assessment /Plan     For exam results, see Encounter Report.    1. Proliferative diabetic retinopathy of both eyes associated with type 2 diabetes mellitus, unspecified proliferative retinopathy type     2. S/P bilateral cataract extraction     3. Pseudophakia of both eyes     4. Astigmatism of both eyes, unspecified type                      S/P bilateral cataract surgery in both eyes, with bilateral posterior chamber intraocular lens.    Non-insulin dependant type 2 diabetes, with prior diagnosis of bilateral proliferative diabetic retinopathy without macular edema.  Followed by Dr. Maldonado.    Intraocular pressure well within normal range in each eye.    Residual refractive error in each eye, with satisfactory best-corrected visual acuity in each eye.    New spectacle lens Rx issued for use as desired.    Recheck refraction in one year - or prior, if any problems or bothersome changes in vision noted in the interim.  Follow up with Dr. Maldonado as he recommends.   "

## 2022-05-24 NOTE — PATIENT INSTRUCTIONS
S/P bilateral cataract surgery in both eyes, with bilateral posterior chamber intraocular lens.    Non-insulin dependant type 2 diabetes, with prior diagnosis of bilateral proliferative diabetic retinopathy without macular edema.  Followed by Dr. Maldonado.    Intraocular pressure well within normal range in each eye.    Residual refractive error in each eye, with satisfactory best-corrected visual acuity in each eye.    New spectacle lens Rx issued for use as desired.    Recheck refraction in one year - or prior, if any problems or bothersome changes in vision noted in the interim.  Follow up with Dr. Maldonado as he recommends.

## 2022-05-25 NOTE — PROGRESS NOTES
"[facility]   Physical Therapy Treatment Note     Name: Annika Mac  [facility] Number: [id number]    Therapy Diagnosis:   Encounter Diagnosis   Name Primary?    Decreased range of motion of lumbar spine Yes     Physician: Evangelist Hamilton FNP    Visit Date: 5/26/2022    Physician Orders: PT Eval and Treat   Medical Diagnosis from Referral:   M47.816 (ICD-10-CM) - Lumbar spondylosis   M47.816 (ICD-10-CM) - Lumbar facet arthropathy   M51.36 (ICD-10-CM) - DDD (degenerative disc disease), lumbar   Evaluation Date: 4/12/2022  Authorization Period Expiration: 4/26/2022  Plan of Care Expiration: 7/12/2022  Visit # / Visits authorized: 5/ 12 (+eval)  PTA Visit #: 0     PN DUE: 6/17/2022    Precautions: Standard and Diabetes    Time In: 10:50 am  Time Out: 11:33 am  Total Billable Time: 43 minutes      SUBJECTIVE     Pt reports: her back is feeling better and she feels 70% better.   She was compliant with home exercise program.  Response to previous treatment: decreased pain  Functional change: she can walk a little farther     Pain: 3-4/10   Location: bilateral low back (L4-L5 level) occasional symptoms running into L LE    OBJECTIVE     Objective Measures updated at progress report unless specified.       TREATMENT   (exercises and techniques performed today are bolded)    Annika received the treatments listed below:      THERAPEUTIC EXERCISES to develop  strength, endurance, ROM, flexibility, posture and core stabilization for 30 minutes including:    Nustep for increased lower extremity range of motion, strength, and endurance x 4 minutes (limited by right knee pain)    Mat exercises performed with wedge and 1 pillow to elevate trunk      Hamstring stretch 5 x 15"   Hip adduction with ball 10 x 10"   Belt block 2 x 10 with 3" holds   LAQ 2 x 10     PPT 2 x 10 with 5" holds   LTR x10 each way   PPT marches 2 x 20    Supine hip abduction 2 x 10 green theraband   SL clamshells 2 x 10 each   SL hip " "internal rotation 2 x 10     Seated posterior pelvic tilts 2 x 10   Seated thoracic rotations with wand x15 ea   Seated overhead raises with wand x15 for thoracic ext     + mini squats x 10 (feeling lightheaded due to possible low sugar)  + seated red theraband rows for improved posture     MANUAL THERAPY TECHNIQUES were applied  for 13 minutes including:     MFR of L lower lumbar paraspinals (multifidi), piriformis, TFL in R sidelying       NEUROMUSCULAR RE-EDUCATION ACTIVITIES to improve Balance, Coordination, Kinesthetic, Sense, Proprioception and Posture for 0 minutes.  The following were included:   + cone walking   + Side stepping 5 laps    + Heel-toe raises standing 2 x 30" each   + Marches on foam 3 x 30"        PATIENT EDUCATION AND HOME EXERCISES     Home Exercises Provided and Patient Education Provided     Education provided:   - educated pt that mild soreness is an expected outcome to therapy session  - educated pt to continue with HEP as issued and prescribed    Written Home Exercises Provided: Patient instructed to cont prior HEP. Exercises were reviewed and Annika was able to demonstrate them prior to the end of the session.  Annika demonstrated good  understanding of the education provided. See EMR under Patient Instructions for exercises provided during therapy sessions    ASSESSMENT     Pt presents with decreased low back pain and reports functional improvement at home. Time on Nustep was limited by right knee pain and standing exercises was limited by patient feeling shaky due to possible low blood sugar. Rows were added for increased postural stability scapular retraction. Mini squats were performed for increased functional lower extremity strength. Exercises had to be modified secondary to pain and low blood sugar. Patient was given juice to help with low blood sugar. Patient's lightheadedness and weakness from low blood sugar had subsided by end of session. Plan to progress towards more standing " exercises as tolerated by patient.      Annika Is progressing well towards her goals.   Pt prognosis is Fair.     Pt will continue to benefit from skilled outpatient physical therapy to address the deficits listed in the problem list box on initial evaluation, provide pt/family education and to maximize pt's level of independence in the home and community environment.     Pt's spiritual, cultural and educational needs considered and pt agreeable to plan of care and goals.     Anticipated barriers to physical therapy: chronic pain and co-morbidities     Goals:   Short Term Goals:  4 weeks   1. Pain: Pt will demonstrate improved pain by reports of less than or equal to 5/10 worst pain on the verbal rating scale in order to progress toward maximal functional ability and improve QOL. MET 5/17/2022   2.  HEP: Patient will demonstrate independence with HEP in order to progress toward functional independence. Met 5/2/2022      Long Term Goals:  8 weeks   1. Function: Patient will demonstrate improved function as indicated by a functional limitation score of 37% on the FOTO. PC   2. Mobility: Patient will present with full lumbar active range of motion in order to return to maximal functional potential and improve quality of life. PM 5/17/2022   3. Strength: Patient will improve strength to 5/5 in bilateral lower extremities in order to improve functional independence and quality of life. PC   4. Patient will return to normal ADL's, community involvement, and recreational activities with no pain and maximal function. PC      Goals Key:  PC= progressing/continue;        PM= partially met;             DC= discontinue        PLAN     Continue PT per POC, assess tolerance to today's session,  progress exercise as tolerated and appropriate      Liz Robin, PT, DPT, PPCES   5/26/2022

## 2022-05-26 ENCOUNTER — CLINICAL SUPPORT (OUTPATIENT)
Dept: REHABILITATION | Facility: HOSPITAL | Age: 72
End: 2022-05-26
Payer: MEDICARE

## 2022-05-26 DIAGNOSIS — M53.86 DECREASED RANGE OF MOTION OF LUMBAR SPINE: Primary | ICD-10-CM

## 2022-05-26 PROCEDURE — 97140 MANUAL THERAPY 1/> REGIONS: CPT | Mod: PN

## 2022-05-26 PROCEDURE — 97110 THERAPEUTIC EXERCISES: CPT | Mod: PN

## 2022-05-30 NOTE — PROGRESS NOTES
"  [facility]   Physical Therapy Treatment Note     Name: Annika Mac  [facility] Number: [id number]    Therapy Diagnosis:   Encounter Diagnosis   Name Primary?    Decreased range of motion of lumbar spine Yes     Physician: Evangelist Hamilton FNP    Visit Date: 5/31/2022    Physician Orders: PT Eval and Treat   Medical Diagnosis from Referral:   M47.816 (ICD-10-CM) - Lumbar spondylosis   M47.816 (ICD-10-CM) - Lumbar facet arthropathy   M51.36 (ICD-10-CM) - DDD (degenerative disc disease), lumbar   Evaluation Date: 4/12/2022  Authorization Period Expiration: 4/26/2022  Plan of Care Expiration: 7/12/2022  Visit # / Visits authorized: 7/ 12 (+eval)  PTA Visit #: 0     PN DUE: 6/17/2022    Precautions: Standard and Diabetes    Time In: 11:00 am  Time Out: 11:45 am  Total Billable Time: 45 minutes      SUBJECTIVE     Pt reports: continued low back pain. Patient had a "nerve burning" last week, but hasn't seen much improvement yet.   She was compliant with home exercise program.  Response to previous treatment: decreased pain  Functional change: she can walk a little farther     Pain: 6/10   Location: bilateral low back (L4-L5 level) occasional symptoms running into L LE    OBJECTIVE     Objective Measures updated at progress report unless specified.       TREATMENT   (exercises and techniques performed today are bolded)    Annika received the treatments listed below:      THERAPEUTIC EXERCISES to develop  strength, endurance, ROM, flexibility, posture and core stabilization for 30 minutes including:    Recumbent bike for increased lower extremity range of motion, strength, and endurance x 3 minutes (limited by knee pain)    Mat exercises performed with wedge and 1 pillow to elevate trunk      Hamstring stretch 5 x 15"   Hip adduction with ball 10 x 10"   Belt block 2 x 10 with 3" holds   LAQ 2 x 10     PPT 2 x 10 with 5" holds   LTR x10 each way   PPT marches 2 x 10  (verbal and tactile cues for correct " "form)  Supine hip abduction 2 x 10 green theraband   SL clamshells 2 x 10 each   SL hip internal rotation 2 x 10     Seated posterior pelvic tilts 2 x 10   Seated thoracic rotations with wand x15 ea   Seated overhead raises with wand x15 for thoracic ext     mini squats x 10   seated green theraband rows for improved posture 2 x 10    MANUAL THERAPY TECHNIQUES were applied  for 15 minutes including:     MFR of L lower lumbar paraspinals (multifidi), piriformis, TFL in R sidelying       NEUROMUSCULAR RE-EDUCATION ACTIVITIES to improve Balance, Coordination, Kinesthetic, Sense, Proprioception and Posture for 0 minutes.  The following were included:   + cone walking   + Side stepping 5 laps    + Heel-toe raises standing 2 x 30" each   + Marches on foam 3 x 30"        PATIENT EDUCATION AND HOME EXERCISES     Home Exercises Provided and Patient Education Provided     Education provided:   - educated pt that mild soreness is an expected outcome to therapy session  - educated pt to continue with HEP as issued and prescribed    Written Home Exercises Provided: Patient instructed to cont prior HEP. Exercises were reviewed and Annika was able to demonstrate them prior to the end of the session.  Annika demonstrated good  understanding of the education provided. See EMR under Patient Instructions for exercises provided during therapy sessions    ASSESSMENT     Pt presents with continued low back pain. Patient continues to present with limited exercise tolerance secondary to pain and poor endurance. The Nustep aggravated patient's knee pain last visit therefore the recumbent bike was performed today. Patient was only able to tolerate 3 minutes on recumbent bike secondary to knee pain possibly due to poor form with hips externally rotated. Plan to progress towards more standing and functional exercises as tolerated by patient.      Annika Is progressing well towards her goals.   Pt prognosis is Fair.     Pt will continue to benefit " from skilled outpatient physical therapy to address the deficits listed in the problem list box on initial evaluation, provide pt/family education and to maximize pt's level of independence in the home and community environment.     Pt's spiritual, cultural and educational needs considered and pt agreeable to plan of care and goals.     Anticipated barriers to physical therapy: chronic pain and co-morbidities     Goals:   Short Term Goals:  4 weeks   1. Pain: Pt will demonstrate improved pain by reports of less than or equal to 5/10 worst pain on the verbal rating scale in order to progress toward maximal functional ability and improve QOL. MET 5/17/2022   2.  HEP: Patient will demonstrate independence with HEP in order to progress toward functional independence. Met 5/2/2022      Long Term Goals:  8 weeks   1. Function: Patient will demonstrate improved function as indicated by a functional limitation score of 37% on the FOTO. PC   2. Mobility: Patient will present with full lumbar active range of motion in order to return to maximal functional potential and improve quality of life. PM 5/17/2022   3. Strength: Patient will improve strength to 5/5 in bilateral lower extremities in order to improve functional independence and quality of life. PC   4. Patient will return to normal ADL's, community involvement, and recreational activities with no pain and maximal function. PC      Goals Key:  PC= progressing/continue;        PM= partially met;             DC= discontinue        PLAN     Continue PT per POC, assess tolerance to today's session,  progress exercise as tolerated and appropriate      Liz Robin, PT, DPT, PPCES   5/31/2022

## 2022-05-31 ENCOUNTER — CLINICAL SUPPORT (OUTPATIENT)
Dept: REHABILITATION | Facility: HOSPITAL | Age: 72
End: 2022-05-31
Payer: MEDICARE

## 2022-05-31 DIAGNOSIS — M53.86 DECREASED RANGE OF MOTION OF LUMBAR SPINE: Primary | ICD-10-CM

## 2022-05-31 PROCEDURE — 97140 MANUAL THERAPY 1/> REGIONS: CPT | Mod: PN

## 2022-05-31 PROCEDURE — 97110 THERAPEUTIC EXERCISES: CPT | Mod: PN

## 2022-06-07 ENCOUNTER — OFFICE VISIT (OUTPATIENT)
Dept: PAIN MEDICINE | Facility: CLINIC | Age: 72
End: 2022-06-07
Payer: MEDICARE

## 2022-06-07 VITALS — HEART RATE: 80 BPM | DIASTOLIC BLOOD PRESSURE: 71 MMHG | SYSTOLIC BLOOD PRESSURE: 129 MMHG

## 2022-06-07 DIAGNOSIS — G57.02 PIRIFORMIS SYNDROME OF LEFT SIDE: Primary | ICD-10-CM

## 2022-06-07 DIAGNOSIS — G89.4 CHRONIC PAIN SYNDROME: ICD-10-CM

## 2022-06-07 DIAGNOSIS — M54.16 LUMBAR RADICULOPATHY: ICD-10-CM

## 2022-06-07 DIAGNOSIS — M51.36 DDD (DEGENERATIVE DISC DISEASE), LUMBAR: ICD-10-CM

## 2022-06-07 DIAGNOSIS — E66.01 MORBID OBESITY: ICD-10-CM

## 2022-06-07 PROCEDURE — 3052F PR MOST RECENT HEMOGLOBIN A1C LEVEL 8.0 - < 9.0%: ICD-10-PCS | Mod: CPTII,S$GLB,, | Performed by: NURSE PRACTITIONER

## 2022-06-07 PROCEDURE — 3066F NEPHROPATHY DOC TX: CPT | Mod: CPTII,S$GLB,, | Performed by: NURSE PRACTITIONER

## 2022-06-07 PROCEDURE — 3052F HG A1C>EQUAL 8.0%<EQUAL 9.0%: CPT | Mod: CPTII,S$GLB,, | Performed by: NURSE PRACTITIONER

## 2022-06-07 PROCEDURE — 3060F PR POS MICROALBUMINURIA RESULT DOCUMENTED/REVIEW: ICD-10-PCS | Mod: CPTII,S$GLB,, | Performed by: NURSE PRACTITIONER

## 2022-06-07 PROCEDURE — 3074F PR MOST RECENT SYSTOLIC BLOOD PRESSURE < 130 MM HG: ICD-10-PCS | Mod: CPTII,S$GLB,, | Performed by: NURSE PRACTITIONER

## 2022-06-07 PROCEDURE — 3078F PR MOST RECENT DIASTOLIC BLOOD PRESSURE < 80 MM HG: ICD-10-PCS | Mod: CPTII,S$GLB,, | Performed by: NURSE PRACTITIONER

## 2022-06-07 PROCEDURE — 1159F MED LIST DOCD IN RCRD: CPT | Mod: CPTII,S$GLB,, | Performed by: NURSE PRACTITIONER

## 2022-06-07 PROCEDURE — 1160F PR REVIEW ALL MEDS BY PRESCRIBER/CLIN PHARMACIST DOCUMENTED: ICD-10-PCS | Mod: CPTII,S$GLB,, | Performed by: NURSE PRACTITIONER

## 2022-06-07 PROCEDURE — 3066F PR DOCUMENTATION OF TREATMENT FOR NEPHROPATHY: ICD-10-PCS | Mod: CPTII,S$GLB,, | Performed by: NURSE PRACTITIONER

## 2022-06-07 PROCEDURE — 4010F PR ACE/ARB THEARPY RXD/TAKEN: ICD-10-PCS | Mod: CPTII,S$GLB,, | Performed by: NURSE PRACTITIONER

## 2022-06-07 PROCEDURE — 4010F ACE/ARB THERAPY RXD/TAKEN: CPT | Mod: CPTII,S$GLB,, | Performed by: NURSE PRACTITIONER

## 2022-06-07 PROCEDURE — 99214 PR OFFICE/OUTPT VISIT, EST, LEVL IV, 30-39 MIN: ICD-10-PCS | Mod: S$GLB,,, | Performed by: NURSE PRACTITIONER

## 2022-06-07 PROCEDURE — 3060F POS MICROALBUMINURIA REV: CPT | Mod: CPTII,S$GLB,, | Performed by: NURSE PRACTITIONER

## 2022-06-07 PROCEDURE — 99214 OFFICE O/P EST MOD 30 MIN: CPT | Mod: S$GLB,,, | Performed by: NURSE PRACTITIONER

## 2022-06-07 PROCEDURE — 99999 PR PBB SHADOW E&M-EST. PATIENT-LVL IV: ICD-10-PCS | Mod: PBBFAC,,, | Performed by: NURSE PRACTITIONER

## 2022-06-07 PROCEDURE — 1125F PR PAIN SEVERITY QUANTIFIED, PAIN PRESENT: ICD-10-PCS | Mod: CPTII,S$GLB,, | Performed by: NURSE PRACTITIONER

## 2022-06-07 PROCEDURE — 3078F DIAST BP <80 MM HG: CPT | Mod: CPTII,S$GLB,, | Performed by: NURSE PRACTITIONER

## 2022-06-07 PROCEDURE — 3074F SYST BP LT 130 MM HG: CPT | Mod: CPTII,S$GLB,, | Performed by: NURSE PRACTITIONER

## 2022-06-07 PROCEDURE — 1160F RVW MEDS BY RX/DR IN RCRD: CPT | Mod: CPTII,S$GLB,, | Performed by: NURSE PRACTITIONER

## 2022-06-07 PROCEDURE — 99999 PR PBB SHADOW E&M-EST. PATIENT-LVL IV: CPT | Mod: PBBFAC,,, | Performed by: NURSE PRACTITIONER

## 2022-06-07 PROCEDURE — 1125F AMNT PAIN NOTED PAIN PRSNT: CPT | Mod: CPTII,S$GLB,, | Performed by: NURSE PRACTITIONER

## 2022-06-07 PROCEDURE — 1159F PR MEDICATION LIST DOCUMENTED IN MEDICAL RECORD: ICD-10-PCS | Mod: CPTII,S$GLB,, | Performed by: NURSE PRACTITIONER

## 2022-06-07 NOTE — PROGRESS NOTES
[facility] Established  Patient Evaluation    Referred by: Dr Marino Ragland  Reason for referral: Right Knee    CC:   Chief Complaint   Patient presents with    Low-back Pain    Leg Pain    Knee Pain   8/26/2014   Pain Disability Index (PDI) 34 21         Interval Updates:  06/07/2022 -  Bubba returns to clinic s/p  Lumbar Medial Branch Radiofrequency Ablation on 5/19/22 with 50% relief of her low back pain.  She is reporting new pain today this pain starts in her left buttocks and radiates down the posterior aspect of her leg to her foot.  Pain is worse when sitting patient currently therapy for low back states that some of the exercises are helping, however this pain is been ongoing for the last 2 weeks and she would like to be considered for injections to help.  She reports a pain intensity 8/10 today with a weekly range of 8-9/10.  The pain is described as Aching, Sharp and Shooting.  Pain is worsened by: sitting and improved by: nothing.      4/11/2022  -  Bubba returns to clinic s/p a Diagnostic Bilateral Lumbar MBB targeting L4/5 and L5/S1 on 4/2/22 with 90% relief with improved functionality.   She reports a pain intensity 6/10 today with a weekly range of 6-7/10.     Interval Updates:  3/28/2022 - - Ms. Mac is following up for No chief complaint on file.  Pain is currently rate 8/10 with a weekly range 8-9/10.  Currently her low back pain and his worst we will begin addressing this 1st.  She has seen orthopedics/Dr. Linares he wants her to lose 10 lb prior to consenting her for right TKA.  Sparse her low back pain she actually received benefit from a previous bilateral lumbar facet injection 5% we targeted the L4-5 L5-S1 facet joints.  She complains of low back pain that has continued, pain is located across her low back with mild radiation into her legs bilaterally.  She denies any recent incident, denies any profound weakness, denies any bowel bladder dysfunction, denies any saddle  "anesthesia at this time.     5/21/21 - Ms. Mac returns to clinic for follow up visit reporting stable low back pain.  Patient is s/p Bilateral L4-5 and L5-S1 Lumbar Facet Injection on 5/6/21 with 75% relief for a" few days and then pain returned"  Pain intensity is currently 7/10.      HPI:   Annika Mac is a 70 y.o. female with numerous problems resulting from, or exacerbated by, morbid obesity who presents complaining of chronic low back pain.  She states longstanding low back pain with a minor component of pain radiating into the back of the legs, all way down to the calf, bilaterally.  She reports rapidly increasing back pain with standing or walking for more than 5 min, requiring her to sit and rest.  Pain decreases very quickly when resting.  In 2014, she received a bilateral facet steroid injection in the low back.  At that time, the provider documented 90% relief of pain.    Location: low back, left buttocks, left posterior leg to ankle  Onset: 2-3 months ago  Current Pain Score: 7/10  Daily Pain of Range: 5-8/10  Quality: Aching, Sharp and Shooting  Radiation: Buttocks  Worsened by: daily activity  Improved by: nothing    Previous Therapies:  PT/OT: Yes, and currently restarting PT  HEP: no  Interventions:   4/5/2022 Bilateral facet injection L4-5 and L5-S1 in 2014 with 90% relief  Surgery: Denies back surgery  Medications:   - NSAIDS:   - MSK Relaxants:   - TCAs:   - SNRIs:   - Topicals:   - Anticonvulsants:  - Opioids:     Current Pain Medications:  1. Lyrica 150 mg b.i.d.  2. Cymbalta 30 mg daily    Review of Systems:  Review of Systems   Constitutional: Negative for chills and fever.   HENT: Negative for nosebleeds.    Eyes: Negative for blurred vision.   Respiratory: Negative for hemoptysis.    Cardiovascular: Negative for chest pain and palpitations.   Gastrointestinal: Negative for heartburn and vomiting.   Genitourinary: Negative for hematuria.   Musculoskeletal: Positive for back pain and joint " "pain. Negative for falls and myalgias.   Skin: Negative for rash.   Neurological: Negative for tingling, focal weakness, seizures and loss of consciousness.   Endo/Heme/Allergies: Does not bruise/bleed easily.   Psychiatric/Behavioral: Negative for hallucinations.       History:    Current Outpatient Medications:     albuterol (PROAIR HFA) 90 mcg/actuation inhaler, Inhale 2 puffs into the lungs every 6 (six) hours as needed for Wheezing. Rescue, Disp: 18 g, Rfl: 6    allopurinoL (ZYLOPRIM) 300 MG tablet, Take 1 tablet (300 mg total) by mouth once daily., Disp: 90 tablet, Rfl: 4    amLODIPine (NORVASC) 10 MG tablet, Take 1 tablet (10 mg total) by mouth once daily., Disp: 90 tablet, Rfl: 3    ammonium lactate 12 % Crea, Apply twice daily to affected parts both feet as needed., Disp: 140 g, Rfl: 11    aspirin 81 MG Chew, Take 1 tablet (81 mg total) by mouth once daily., Disp: , Rfl: 0    BD ULTRA-FINE KIKA PEN NEEDLE 32 gauge x 5/32" Ndle, To use 4 times daily, Disp: 200 each, Rfl: 20    betamethasone dipropionate (DIPROLENE) 0.05 % cream, Apply topically 2 (two) times daily. Prn hand rash, Disp: 45 g, Rfl: 0    blood sugar diagnostic Strp, 1 each by Misc.(Non-Drug; Combo Route) route 3 (three) times daily. Dx code  E11.42 . Contour Next, Disp: 200 each, Rfl: 12    blood-glucose meter kit, 1 each by Other route once daily. Use daily. Insurance proffered one touch  E11.42 (Patient taking differently: 1 each by Other route once daily. Contour next .  Insurance proffered one touch  E11.42), Disp: 1 each, Rfl: 0    blood-glucose sensor (DEXCOM G6 SENSOR) Terese, Use as Directed, Disp: 2 Device, Rfl: 11    blood-glucose transmitter (DEXCOM G6 TRANSMITTER) Terese, Every 3 months. Use as Directed to check blood glucose., Disp: 1 Device, Rfl: 3    chlorthalidone (HYGROTEN) 25 MG Tab, Take 1 tablet (25 mg total) by mouth once daily., Disp: 30 tablet, Rfl: 11    colchicine (COLCRYS) 0.6 mg tablet, TAKE 1 TABLET EVERY " OTHER DAY, Disp: 45 tablet, Rfl: 3    diclofenac sodium (VOLTAREN) 1 % Gel, Apply 2 g topically 4 (four) times daily., Disp: 1 Tube, Rfl: 2    DULoxetine (CYMBALTA) 30 MG capsule, Take 1 capsule (30 mg total) by mouth once daily., Disp: 90 capsule, Rfl: 3    famotidine (PEPCID) 20 MG tablet, Take 1 tablet (20 mg total) by mouth 2 (two) times daily., Disp: 1 tablet, Rfl: 0    fexofenadine (ALLEGRA) 180 MG tablet, TAKE 1 TABLET BY MOUTH ONCE DAILY, Disp: 30 tablet, Rfl: 0    glucagon (GVOKE HYPOPEN 1-PACK) 0.5 mg/0.1 mL AtIn, Inject 1 Dose into the skin daily as needed (for severe hypoglycemia if you aren't able to treat by ingesting sugar). (Patient not taking: Reported on 5/17/2021), Disp: 1 Syringe, Rfl: 3    HYDROcodone-acetaminophen (NORCO) 7.5-325 mg per tablet, Take 1 tablet by mouth every 6 (six) hours as needed. (Patient not taking: Reported on 4/7/2021), Disp: 28 tablet, Rfl: 0    insulin NPH-insulin regular, 70/30, (NOVOLIN 70/30 U-100 INSULIN) 100 unit/mL (70-30) injection, 120 UNITS INTO THE SKIN WITH BREAKFAST, 110 UNITS WITH LUNCH, AND INJECT 90 UNITS WITH DINNER ; . (Patient taking differently: 110 UNITS INTO THE SKIN WITH BREAKFAST, 85 UNITS WITH LUNCH, AND INJECT 65 UNITS WITH DINNER ; .), Disp: 30 mL, Rfl: 3    irbesartan (AVAPRO) 300 MG tablet, Take 1 tablet (300 mg total) by mouth every evening., Disp: 90 tablet, Rfl: 3    ketoconazole (NIZORAL) 2 % cream, Apply topically once daily., Disp: 1 Tube, Rfl: 2    lancets 31 gauge Misc, 1 lancet by Misc.(Non-Drug; Combo Route) route 4 (four) times daily. E11.42 . Prescribed one touch, Disp: 200 each, Rfl: 6    LIDOcaine HCL 2% (XYLOCAINE) 2 % jelly, Apply topically as needed. Apply topically once nightly to affected part of foot/feet. (Patient not taking: Reported on 5/17/2021), Disp: 30 mL, Rfl: 2    methylPREDNISolone (MEDROL DOSEPACK) 4 mg tablet, use as directed (Patient not taking: Reported on 5/17/2021), Disp: 1 Package,  Rfl: 0    multivitamin (THERAGRAN) per tablet, Take 1 tablet by mouth once daily., Disp: , Rfl:     pregabalin (LYRICA) 150 MG capsule, Take 1 capsule (150 mg total) by mouth 2 (two) times daily., Disp: 180 capsule, Rfl: 3    semaglutide (OZEMPIC) 1 mg/dose (2 mg/1.5 mL) PnIj, Inject 0.75 mLs into the skin every 7 days. (1 mg every week), Disp: 9 mL, Rfl: 4    simvastatin (ZOCOR) 40 MG tablet, Take 1 tablet (40 mg total) by mouth every evening., Disp: 90 tablet, Rfl: 3    sodium bicarbonate 325 MG tablet, Take 2 tablets (650 mg total) by mouth 2 (two) times daily., Disp: 120 tablet, Rfl: 11    topiramate (TOPAMAX) 50 MG tablet, Take 1 tablet (50 mg total) by mouth 2 (two) times daily., Disp: 180 tablet, Rfl: 0    traMADol (ULTRAM) 50 mg tablet, Take 1 tablet (50 mg total) by mouth every 8 (eight) hours as needed., Disp: 90 tablet, Rfl: 3  No current facility-administered medications for this visit.    Facility-Administered Medications Ordered in Other Visits:     fentaNYL injection 25 mcg, 25 mcg, Intravenous, Q5 Min PRN, Desmond Fontana MD, 50 mcg at 01/08/21 0955    midazolam (VERSED) 1 mg/mL injection 0.5 mg, 0.5 mg, Intravenous, PRN, Desmond Fontana MD, 2 mg at 01/08/21 0955    Past Medical History:   Diagnosis Date    Asthma     Constipation 10/3/2019    Deep vein thrombophlebitis of left leg 7/27/2012    Deep vein thrombosis     when she had a knee replacement    Encounter for blood transfusion     anemic     GERD (gastroesophageal reflux disease)     Obesity, diabetes, and hypertension syndrome 2/13/2019    Obstructive sleep apnea 7/27/2012    PAD (peripheral artery disease) 11/21/2013    Type 2 diabetes mellitus with obesity 8/19/2020    Type 2 diabetes mellitus with peripheral artery disease 8/19/2020       Past Surgical History:   Procedure Laterality Date    CATARACT EXTRACTION W/  INTRAOCULAR LENS IMPLANT Left 3/2002    left     CATARACT EXTRACTION W/   INTRAOCULAR LENS IMPLANT Right     OD dr. olivares     SECTION      COLONOSCOPY N/A 2018    Procedure: COLONOSCOPY;  Surgeon: Faizan Mac MD;  Location: [facility] ENDO (2ND FLR);  Service: Endoscopy;  Laterality: N/A;    CYST REMOVAL  2011    left side of face    CYSTOSCOPY W/ RETROGRADES Left 2020    Procedure: CYSTOSCOPY, WITH RETROGRADE PYELOGRAM;  Surgeon: Noman Rivas MD;  Location: [facility] OR 1ST FLR;  Service: Urology;  Laterality: Left;  30min    DE QUERVAIN'S RELEASE  2021    Procedure: RELEASE, HAND, FOR DEQUERVAIN'S TENOSYNOVITIS;  Surgeon: Marky Hagen MD;  Location: [facility] OR;  Service: Orthopedics;;    EYE SURGERY Bilateral     eye implants    GANGLION CYST EXCISION  2007    Right wrist    INJECTION OF FACET JOINT Bilateral 2021    Procedure: INJECTION, FACET JOINT--Bilateral L4-5, L5-S1;  Surgeon: Alireza Meraz Jr., MD;  Location: [facility] PAIN MGT;  Service: Pain Management;  Laterality: Bilateral;  Oral    INTERPOSITION ARTHROPLASTY OF CARPOMETACARPAL JOINTS Left 2021    Procedure: INTERPOSITION ARTHROPLASTY, CMC JOINT - left dequervains and cmc arthroplasty, arthrex;  Surgeon: Marky Hagen MD;  Location: [facility] OR;  Service: Orthopedics;  Laterality: Left;    JOINT REPLACEMENT Left     Pan Retinal Photocoagulation Bilateral     Dr. Monterroso (Proliferative Diabetic Retinopathy)    TOTAL ABDOMINAL HYSTERECTOMY      TOTAL KNEE ARTHROPLASTY  2006    left    TRIGGER FINGER RELEASE  2009       Family History   Problem Relation Age of Onset    Diabetes Mother     Hypertension Mother     Heart disease Father     Diabetes Sister     Thyroid disease Brother     Diabetes Brother     Hypertension Brother     No Known Problems Daughter     No Known Problems Son     No Known Problems Daughter     Amblyopia Neg Hx     Blindness Neg Hx     Glaucoma Neg Hx     Macular degeneration Neg Hx     Retinal detachment Neg Hx      Strabismus Neg Hx     Colon cancer Neg Hx     Esophageal cancer Neg Hx        Social History     Socioeconomic History    Marital status:      Spouse name: Heber    Number of children: 3    Years of education: Not on file    Highest education level: Not on file   Occupational History    Occupation:    Tobacco Use    Smoking status: Never Smoker    Smokeless tobacco: Never Used   Substance and Sexual Activity    Alcohol use: No    Drug use: No    Sexual activity: Yes     Partners: Male   Other Topics Concern    Not on file   Social History Narrative    , lives with family; 3 children, 4 grandchildren     Social Determinants of Health     Financial Resource Strain:     Difficulty of Paying Living Expenses:    Food Insecurity:     Worried About Running Out of Food in the Last Year:     Ran Out of Food in the Last Year:    Transportation Needs:     Lack of Transportation (Medical):     Lack of Transportation (Non-Medical):    Physical Activity:     Days of Exercise per Week:     Minutes of Exercise per Session:    Stress: No Stress Concern Present    Feeling of Stress : Not at all   Social Connections:     Frequency of Communication with Friends and Family:     Frequency of Social Gatherings with Friends and Family:     Attends [employer]ism Services:     Active Member of Clubs or Organizations:     Attends Club or Organization Meetings:     Marital Status:        Review of patient's allergies indicates:   Allergen Reactions    Iodinated contrast media Rash       Physical Exam:  There were no vitals filed for this visit.  General    Nursing note and vitals reviewed.  Constitutional: She is oriented to person, place, and time. She appears well-developed and well-nourished. No distress.   HENT:   Head: Normocephalic and atraumatic.   Nose: Nose normal.   Eyes: Conjunctivae and EOM are normal. Pupils are equal, round, and reactive to light. Right eye exhibits no  discharge. Left eye exhibits no discharge. No scleral icterus.   Neck: No JVD present.   Cardiovascular: Intact distal pulses.    Pulmonary/Chest: Effort normal. No respiratory distress.   Abdominal: She exhibits no distension.   Neurological: She is alert and oriented to person, place, and time. Coordination normal.   Psychiatric: She has a normal mood and affect. Her behavior is normal. Judgment and thought content normal.     General Musculoskeletal Exam   Gait: normal       Right Knee Exam     Inspection   Swelling: present  Deformity: absent    Tenderness   The patient is tender to palpation of the medial joint line and lateral joint line.    Range of Motion   Extension: normal Right knee extension grade: painful.   Flexion: normal Right knee flexion: painful.     Other   Sensation: normal    Left Knee Exam     Inspection   Swelling: present  Deformity: absent    Tenderness   The patient tender to palpation of the medial joint line and lateral joint line.    Range of Motion   Extension: normal Left knee extension grade: painful.   Flexion: normal Left knee flexion: painful.     Other   Sensation: normalBack (L-Spine & T-Spine) / Neck (C-Spine) Exam     Tenderness Right paramedian tenderness of the Lower L-Spine. Left paramedian tenderness of the Lower L-Spine.     Back (L-Spine & T-Spine) Range of Motion   Back extension: facet loading is positive and exacerabtes/reproduces the patient's typical low back pain    Back flexion: limited ROM but partial relief of low back pain noted.     Spinal Sensation   Right Side Sensation  L-Spine Level: normal  Left Side Sensation  L-Spine Level: normal    Other She has no scoliosis .      Muscle Strength   Right Lower Extremity   Hip Flexion: 5/5   Hip Extensors: 5/5  Quadriceps:  5/5   Hamstrin/5   Gastrocsoleus:  5/5   Left Lower Extremity   Hip Flexion: 5/5   Hip Extensors: 5/5  Quadriceps:  5/5   Hamstrin/5   Gastrocsoleus:  5/5     Reflexes     Left  Side  Achilles:  2+  Quadriceps:  2+    Right Side   Achilles:  2+  Quadriceps:  2+      Imagin2021  X-ray Knee Ortho Right  CLINICAL HISTORY:  R KNEE; Pain in right knee  TECHNIQUE:  AP standing of both knees, Merchant views of both knees as well as a lateral view of the right knee were performed.  COMPARISON:  2018  FINDINGS:  No acute fracture seen.  No significant suprapatellar joint effusion.  Tricompartmental osteophyte formation with advanced narrowing of the medial and patellofemoral compartments.  Previous left knee arthroplasty with metallic components in good position.  Impression:  Osteoarthritis with advanced joint space narrowing.    Labs:  BMP  Lab Results   Component Value Date     2021    K 4.0 2021     2021    CO2 26 2021    BUN 30 (H) 2021    CREATININE 1.8 (H) 2021    CALCIUM 9.0 2021    ANIONGAP 8 2021    ESTGFRAFRICA 32.2 (A) 2021    EGFRNONAA 27.9 (A) 2021     Lab Results   Component Value Date    ALT 18 2021    AST 24 2021    ALKPHOS 122 2021    BILITOT 0.3 2021     Wt Readings from Last 5 Encounters:   21 (!) 142.9 kg (315 lb 0.6 oz)   21 95.3 kg (210 lb)   04/15/21 (!) 145.6 kg (320 lb 15.8 oz)   21 (!) 145.6 kg (321 lb)   21 (!) 146.5 kg (323 lb)       Assessment:  Problem List Items Addressed This Visit        Neuro    DDD (degenerative disc disease), lumbar    Chronic pain       Orthopedic    Chronic back pain      Other Visit Diagnoses     Lumbar spondylosis    -  Primary    Arthropathy of facet joints at multiple levels        Primary osteoarthritis of both knees              21 - Annika ROSARIO Mac is a 70 y.o. female with diffuse chronic pain related to degenerative joint disease and degenerative spine disease complicated by morbid obesity and likely sedentary lifestyle as well.  Patient is currently receiving treatment for weight reduction by the  Bariatric Medicine Department and follows up with Rheumatology, along with a multitude of other providers.  She received a bilateral lumbar facet injection for low back pain in 2014 which provided her 90% relief based on postoperative assessment.  I recommended repeating this injection.  While patient was referred for knee pain, she chose to focus on low back pain during today's encounter; however, we can treat her knee pain in the future if that is something she wants to discuss.  When she was last in this clinic in 2014 she received recommendations to lose weight and establish a daily home exercise regimen, both of which remain relevant today.    5/21/2021- Annika Mac is a 70 y.o. female who  has a past medical history of Asthma, Constipation (10/3/2019), Deep vein thrombophlebitis of left leg (7/27/2012), Deep vein thrombosis, Encounter for blood transfusion, GERD (gastroesophageal reflux disease), Obesity, diabetes, and hypertension syndrome (2/13/2019), Obstructive sleep apnea (7/27/2012), PAD (peripheral artery disease) (11/21/2013), Type 2 diabetes mellitus with obesity (8/19/2020), and Type 2 diabetes mellitus with peripheral artery disease (8/19/2020).  By history and examination this patient has chronic low back pain without  radiculopathy.  The underlying cause cause is facet arthritis, degenerative disc disease, muscle dysfunction and deconditioning.  Pathology is confirmed by imaging.  We discussed the underlying diagnoses and multiple treatment options including non-opioid medications, injections, physical therapy, home exercise, activity modification / rest and weight loss.  The risks and benefits of each treatment option were discussed and all questions were answered.  I discussed with patient I will consult Dr. Meraz to see if he would like to move forward with scheduling the bilateral lumbar RFA, patient verbalized understanding and agreed.  Until that I recommended that she continue with  physical therapy as well as stabbing home exercise plan that involves daily stretching and core strengthening.    03/28/[id number]-cxal-old morbidly obese female with a history of chronic low back pain related to facet arthropathy, DDD complicated by morbid obesity and a sedentary lifestyle.  She has benefited from facet steroid injections targeting L4-5 and L5-S1, patient today on recommending a diagnostic bilateral lumbar MBB targeting L4-5 L5-S1 and considering her for a lumbar RFA following.  The patient was amenable to this plan also provide her with information explain the procedure even further.   Lastly, we did discuss considering her for a right knee GN block and RFA to help with chronic right knee pian, she is being considered for a right TKA with Dr. Ragland once she has los approximately 10 lbs.     04/11/[id number]-ryzn-old female with a history of chronic low back pain related to facet arthropathy, DDD complicated by morbid obesity and sedentary lifestyle.  She is status post her 1st diagnostic lumbar MBB targeting L4-5 L5-S1 with 9% relief and improved functionality.  Discussed today that will now recommend a 2nd diagnostic lumbar MBB targeting the above-mentioned levels.  If appropriate we will schedule for lumbar RFA.    06/07/[id number]-uhue-old female status post a lumbar RFA targeting L4-5 L5-S1 she did receive 50% relief of her low back pain.  She did have a new complaint today pain in the left buttocks radiating down the posterior left leg, based on  history and exam her pain is likely related to piriformis syndrome, her pain is complicated by her morbid obesity and sedentary lifestyle.  I recommended establishing a HE plan that focuses on piriformis stretching exercises, I will also provided her some HE sheets.  Based on Previous lumbar MRI she does have disc bulges at L4-5 and L5-S1 couple with her facet arthropathy that results in mild spinal canal stenosis at L4-5.  At L5-S1 she has moderate left  neural foraminal narrowing which could be the cause of her left leg pain however this is unclear at this point.  Discussed with her that if her pain does not get better over time with stretching that we can update her lumbar MRI and possibly consider a lumbar NAYLA based on the results.      : Reviewed and consistent with medication use as prescribed.    Treatment Plan:   Procedures:  None at this time.  Consider left piriformis injection in the future                                                        Consider lumbar NAYLA however will need a updated image prior to.               Consider a right knee GNB and RFA in the future.   PT/OT/HEP:    - continue PT as scheduled, home exercise sheets specifically given for piriformis stretches.   - daily cardio   - daily stretching   - focused HEP  Medications: No changes recommended at this time.  Labs: reviewed and medications are appropriately dosed for current hepatorenal function.  Imaging:  Consider updating image if patient does not get better over the next 6-8 weeks    Follow Up:  6-8 weeks or sooner if needed    ROSANGELA RayC  Interventional Pain Management    Disclaimer: This note was partly generated using dictation software which may occasionally result in transcription errors.

## 2022-06-09 ENCOUNTER — CLINICAL SUPPORT (OUTPATIENT)
Dept: REHABILITATION | Facility: HOSPITAL | Age: 72
End: 2022-06-09
Payer: MEDICARE

## 2022-06-09 DIAGNOSIS — M53.86 DECREASED RANGE OF MOTION OF LUMBAR SPINE: Primary | ICD-10-CM

## 2022-06-09 PROCEDURE — 97140 MANUAL THERAPY 1/> REGIONS: CPT | Mod: PN,CQ

## 2022-06-09 PROCEDURE — 97110 THERAPEUTIC EXERCISES: CPT | Mod: PN,CQ

## 2022-06-09 NOTE — PROGRESS NOTES
"    [facility]   Physical Therapy Treatment Note     Name: Annika Mac  Clinic Number: [id number]    Therapy Diagnosis:   Encounter Diagnosis   Name Primary?    Decreased range of motion of lumbar spine Yes     Physician: Evangelist Hamilton FNP    Visit Date: 6/9/2022    Physician Orders: PT Eval and Treat   Medical Diagnosis from Referral:   M47.816 (ICD-10-CM) - Lumbar spondylosis   M47.816 (ICD-10-CM) - Lumbar facet arthropathy   M51.36 (ICD-10-CM) - DDD (degenerative disc disease), lumbar   Evaluation Date: 4/12/2022  Authorization Period Expiration: 4/26/2022  Plan of Care Expiration: 7/12/2022  Visit # / Visits authorized: 8/ 12 (+eval)  PTA Visit #: 1     PN DUE: 6/17/2022    Precautions: Standard and Diabetes    Time In: 11:36am  Time Out: 12:15am  Total Billable Time: 39 minutes      SUBJECTIVE     Pt reports: Doing alright today, MD wanted us to have a look at the piriformis syndrome sheet she brought in today and treat for that.  She was compliant with home exercise program.  Response to previous treatment: decreased pain  Functional change: she can walk a little farther     Pain: 6/10   Location: bilateral low back (L4-L5 level) occasional symptoms running into L LE    OBJECTIVE     Objective Measures updated at progress report unless specified.       TREATMENT   (exercises and techniques performed today are bolded)    Annika received the treatments listed below:      THERAPEUTIC EXERCISES to develop  strength, endurance, ROM, flexibility, posture and core stabilization for 23 minutes including:    Recumbent bike for increased lower extremity range of motion, strength, and endurance x 3 minutes (limited by knee pain)    Mat exercises performed with wedge and 1 pillow to elevate trunk      +Fwd flexion with swiss ball 2 x 10  Hamstring stretch 5 x 15"   Hip adduction with ball 10 x 10"   Belt block 2 x 10 with 3" holds   LAQ 2 x 10     PPT 2 x 10 with 5" holds   LTR x10 each way " "  PPT marches 2 x 10  (verbal and tactile cues for correct form)  Supine hip abduction 2 x 10 green theraband   SL clamshells 2 x 10 each   SL hip internal rotation 2 x 10     Seated posterior pelvic tilts 2 x 10   Seated thoracic rotations with wand x15 ea   Seated overhead raises with wand x15 for thoracic ext     mini squats x 10   seated green theraband rows for improved posture 2 x 10    MANUAL THERAPY TECHNIQUES were applied  for 16 minutes including:     MFR of L lower lumbar paraspinals (multifidi), piriformis, TFL in R sidelying       NEUROMUSCULAR RE-EDUCATION ACTIVITIES to improve Balance, Coordination, Kinesthetic, Sense, Proprioception and Posture for 0 minutes.  The following were included:   + cone walking   + Side stepping 5 laps    + Heel-toe raises standing 2 x 30" each   + Marches on foam 3 x 30"        PATIENT EDUCATION AND HOME EXERCISES     Home Exercises Provided and Patient Education Provided     Education provided:   - educated pt that mild soreness is an expected outcome to therapy session  - educated pt to continue with HEP as issued and prescribed    Written Home Exercises Provided: Patient instructed to cont prior HEP. Exercises were reviewed and Annika was able to demonstrate them prior to the end of the session.  Annika demonstrated good  understanding of the education provided. See EMR under Patient Instructions for exercises provided during therapy sessions    ASSESSMENT     Pt presents today with ongoing pain in low back and down L LE. Added seated flexion exercises today, pt reported decreasing pain and LE symptoms with the exercise. Will continue to progress seated flexion as needed and tolerated. Continued to apply soft tissue mobilization and trigger point release to L piriformis, pt had high tenderness to palpation but was able to tolerate manual therapy well, some muscle release noted with improvement in soft tissue extensibility after manual therapy.    Annika Is progressing well " towards her goals.   Pt prognosis is Fair.     Pt will continue to benefit from skilled outpatient physical therapy to address the deficits listed in the problem list box on initial evaluation, provide pt/family education and to maximize pt's level of independence in the home and community environment.     Pt's spiritual, cultural and educational needs considered and pt agreeable to plan of care and goals.     Anticipated barriers to physical therapy: chronic pain and co-morbidities     Goals:   Short Term Goals:  4 weeks   1. Pain: Pt will demonstrate improved pain by reports of less than or equal to 5/10 worst pain on the verbal rating scale in order to progress toward maximal functional ability and improve QOL. MET 5/17/2022   2.  HEP: Patient will demonstrate independence with HEP in order to progress toward functional independence. Met 5/2/2022      Long Term Goals:  8 weeks   1. Function: Patient will demonstrate improved function as indicated by a functional limitation score of 37% on the FOTO. PC   2. Mobility: Patient will present with full lumbar active range of motion in order to return to maximal functional potential and improve quality of life. PM 5/17/2022   3. Strength: Patient will improve strength to 5/5 in bilateral lower extremities in order to improve functional independence and quality of life. PC   4. Patient will return to normal ADL's, community involvement, and recreational activities with no pain and maximal function. PC      Goals Key:  PC= progressing/continue;        PM= partially met;             DC= discontinue        PLAN     Continue PT per POC, assess tolerance to today's session,  progress exercise as tolerated and appropriate      Elijah Latif, PTA  6/9/2022

## 2022-06-13 ENCOUNTER — OFFICE VISIT (OUTPATIENT)
Dept: HEMATOLOGY/ONCOLOGY | Facility: CLINIC | Age: 72
End: 2022-06-13
Payer: MEDICARE

## 2022-06-13 ENCOUNTER — LAB VISIT (OUTPATIENT)
Dept: LAB | Facility: HOSPITAL | Age: 72
End: 2022-06-13
Attending: INTERNAL MEDICINE
Payer: MEDICARE

## 2022-06-13 VITALS
RESPIRATION RATE: 16 BRPM | OXYGEN SATURATION: 97 % | HEIGHT: 65 IN | WEIGHT: 293 LBS | DIASTOLIC BLOOD PRESSURE: 62 MMHG | SYSTOLIC BLOOD PRESSURE: 130 MMHG | BODY MASS INDEX: 48.82 KG/M2 | HEART RATE: 69 BPM

## 2022-06-13 DIAGNOSIS — D64.9 ANEMIA, UNSPECIFIED TYPE: ICD-10-CM

## 2022-06-13 DIAGNOSIS — D64.9 ANEMIA, UNSPECIFIED TYPE: Primary | ICD-10-CM

## 2022-06-13 LAB
ALBUMIN SERPL BCP-MCNC: 3.4 G/DL (ref 3.5–5.2)
ALP SERPL-CCNC: 112 U/L (ref 55–135)
ALT SERPL W/O P-5'-P-CCNC: 23 U/L (ref 10–44)
ANION GAP SERPL CALC-SCNC: 8 MMOL/L (ref 8–16)
AST SERPL-CCNC: 30 U/L (ref 10–40)
BASOPHILS # BLD AUTO: 0.04 K/UL (ref 0–0.2)
BASOPHILS NFR BLD: 0.8 % (ref 0–1.9)
BILIRUB SERPL-MCNC: 0.3 MG/DL (ref 0.1–1)
BUN SERPL-MCNC: 36 MG/DL (ref 8–23)
CALCIUM SERPL-MCNC: 9.2 MG/DL (ref 8.7–10.5)
CHLORIDE SERPL-SCNC: 106 MMOL/L (ref 95–110)
CO2 SERPL-SCNC: 25 MMOL/L (ref 23–29)
CREAT SERPL-MCNC: 1.9 MG/DL (ref 0.5–1.4)
DIFFERENTIAL METHOD: ABNORMAL
EOSINOPHIL # BLD AUTO: 0.3 K/UL (ref 0–0.5)
EOSINOPHIL NFR BLD: 5.5 % (ref 0–8)
ERYTHROCYTE [DISTWIDTH] IN BLOOD BY AUTOMATED COUNT: 15.4 % (ref 11.5–14.5)
ERYTHROCYTE [DISTWIDTH] IN BLOOD BY AUTOMATED COUNT: 15.4 % (ref 11.5–14.5)
EST. GFR  (AFRICAN AMERICAN): 29.9 ML/MIN/1.73 M^2
EST. GFR  (NON AFRICAN AMERICAN): 26 ML/MIN/1.73 M^2
FERRITIN SERPL-MCNC: 490 NG/ML (ref 20–300)
GLUCOSE SERPL-MCNC: 251 MG/DL (ref 70–110)
HCT VFR BLD AUTO: 28.7 % (ref 37–48.5)
HCT VFR BLD AUTO: 29 % (ref 37–48.5)
HGB BLD-MCNC: 9.1 G/DL (ref 12–16)
HGB BLD-MCNC: 9.1 G/DL (ref 12–16)
IMM GRANULOCYTES # BLD AUTO: 0.01 K/UL (ref 0–0.04)
IMM GRANULOCYTES # BLD AUTO: 0.02 K/UL (ref 0–0.04)
IMM GRANULOCYTES NFR BLD AUTO: 0.4 % (ref 0–0.5)
LYMPHOCYTES # BLD AUTO: 1.3 K/UL (ref 1–4.8)
LYMPHOCYTES NFR BLD: 27.3 % (ref 18–48)
MCH RBC QN AUTO: 29.3 PG (ref 27–31)
MCH RBC QN AUTO: 29.6 PG (ref 27–31)
MCHC RBC AUTO-ENTMCNC: 31.4 G/DL (ref 32–36)
MCHC RBC AUTO-ENTMCNC: 31.7 G/DL (ref 32–36)
MCV RBC AUTO: 93 FL (ref 82–98)
MCV RBC AUTO: 94 FL (ref 82–98)
MONOCYTES # BLD AUTO: 0.6 K/UL (ref 0.3–1)
MONOCYTES NFR BLD: 11.5 % (ref 4–15)
NEUTROPHILS # BLD AUTO: 2.7 K/UL (ref 1.8–7.7)
NEUTROPHILS # BLD AUTO: 2.7 K/UL (ref 1.8–7.7)
NEUTROPHILS NFR BLD: 54.5 % (ref 38–73)
NRBC BLD-RTO: 0 /100 WBC
PLATELET # BLD AUTO: 204 K/UL (ref 150–450)
PLATELET # BLD AUTO: 208 K/UL (ref 150–450)
PMV BLD AUTO: 11.4 FL (ref 9.2–12.9)
PMV BLD AUTO: 11.6 FL (ref 9.2–12.9)
POTASSIUM SERPL-SCNC: 4.3 MMOL/L (ref 3.5–5.1)
PROT SERPL-MCNC: 5.9 G/DL (ref 6–8.4)
RBC # BLD AUTO: 3.07 M/UL (ref 4–5.4)
RBC # BLD AUTO: 3.11 M/UL (ref 4–5.4)
SODIUM SERPL-SCNC: 139 MMOL/L (ref 136–145)
WBC # BLD AUTO: 4.87 K/UL (ref 3.9–12.7)
WBC # BLD AUTO: 4.95 K/UL (ref 3.9–12.7)

## 2022-06-13 PROCEDURE — 99213 PR OFFICE/OUTPT VISIT, EST, LEVL III, 20-29 MIN: ICD-10-PCS | Mod: S$GLB,,, | Performed by: INTERNAL MEDICINE

## 2022-06-13 PROCEDURE — 3052F HG A1C>EQUAL 8.0%<EQUAL 9.0%: CPT | Mod: CPTII,S$GLB,, | Performed by: INTERNAL MEDICINE

## 2022-06-13 PROCEDURE — 3075F SYST BP GE 130 - 139MM HG: CPT | Mod: CPTII,S$GLB,, | Performed by: INTERNAL MEDICINE

## 2022-06-13 PROCEDURE — 99999 PR PBB SHADOW E&M-EST. PATIENT-LVL IV: CPT | Mod: PBBFAC,,, | Performed by: INTERNAL MEDICINE

## 2022-06-13 PROCEDURE — 85025 COMPLETE CBC W/AUTO DIFF WBC: CPT | Performed by: INTERNAL MEDICINE

## 2022-06-13 PROCEDURE — 1125F PR PAIN SEVERITY QUANTIFIED, PAIN PRESENT: ICD-10-PCS | Mod: CPTII,S$GLB,, | Performed by: INTERNAL MEDICINE

## 2022-06-13 PROCEDURE — 4010F ACE/ARB THERAPY RXD/TAKEN: CPT | Mod: CPTII,S$GLB,, | Performed by: INTERNAL MEDICINE

## 2022-06-13 PROCEDURE — 1101F PT FALLS ASSESS-DOCD LE1/YR: CPT | Mod: CPTII,S$GLB,, | Performed by: INTERNAL MEDICINE

## 2022-06-13 PROCEDURE — 3288F FALL RISK ASSESSMENT DOCD: CPT | Mod: CPTII,S$GLB,, | Performed by: INTERNAL MEDICINE

## 2022-06-13 PROCEDURE — 1159F MED LIST DOCD IN RCRD: CPT | Mod: CPTII,S$GLB,, | Performed by: INTERNAL MEDICINE

## 2022-06-13 PROCEDURE — 3288F PR FALLS RISK ASSESSMENT DOCUMENTED: ICD-10-PCS | Mod: CPTII,S$GLB,, | Performed by: INTERNAL MEDICINE

## 2022-06-13 PROCEDURE — 82728 ASSAY OF FERRITIN: CPT | Performed by: INTERNAL MEDICINE

## 2022-06-13 PROCEDURE — 99999 PR PBB SHADOW E&M-EST. PATIENT-LVL IV: ICD-10-PCS | Mod: PBBFAC,,, | Performed by: INTERNAL MEDICINE

## 2022-06-13 PROCEDURE — 3075F PR MOST RECENT SYSTOLIC BLOOD PRESS GE 130-139MM HG: ICD-10-PCS | Mod: CPTII,S$GLB,, | Performed by: INTERNAL MEDICINE

## 2022-06-13 PROCEDURE — 99213 OFFICE O/P EST LOW 20 MIN: CPT | Mod: S$GLB,,, | Performed by: INTERNAL MEDICINE

## 2022-06-13 PROCEDURE — 3078F DIAST BP <80 MM HG: CPT | Mod: CPTII,S$GLB,, | Performed by: INTERNAL MEDICINE

## 2022-06-13 PROCEDURE — 1159F PR MEDICATION LIST DOCUMENTED IN MEDICAL RECORD: ICD-10-PCS | Mod: CPTII,S$GLB,, | Performed by: INTERNAL MEDICINE

## 2022-06-13 PROCEDURE — 4010F PR ACE/ARB THEARPY RXD/TAKEN: ICD-10-PCS | Mod: CPTII,S$GLB,, | Performed by: INTERNAL MEDICINE

## 2022-06-13 PROCEDURE — 3060F PR POS MICROALBUMINURIA RESULT DOCUMENTED/REVIEW: ICD-10-PCS | Mod: CPTII,S$GLB,, | Performed by: INTERNAL MEDICINE

## 2022-06-13 PROCEDURE — 3066F PR DOCUMENTATION OF TREATMENT FOR NEPHROPATHY: ICD-10-PCS | Mod: CPTII,S$GLB,, | Performed by: INTERNAL MEDICINE

## 2022-06-13 PROCEDURE — 1101F PR PT FALLS ASSESS DOC 0-1 FALLS W/OUT INJ PAST YR: ICD-10-PCS | Mod: CPTII,S$GLB,, | Performed by: INTERNAL MEDICINE

## 2022-06-13 PROCEDURE — 3008F BODY MASS INDEX DOCD: CPT | Mod: CPTII,S$GLB,, | Performed by: INTERNAL MEDICINE

## 2022-06-13 PROCEDURE — 3052F PR MOST RECENT HEMOGLOBIN A1C LEVEL 8.0 - < 9.0%: ICD-10-PCS | Mod: CPTII,S$GLB,, | Performed by: INTERNAL MEDICINE

## 2022-06-13 PROCEDURE — 3060F POS MICROALBUMINURIA REV: CPT | Mod: CPTII,S$GLB,, | Performed by: INTERNAL MEDICINE

## 2022-06-13 PROCEDURE — 85027 COMPLETE CBC AUTOMATED: CPT | Performed by: INTERNAL MEDICINE

## 2022-06-13 PROCEDURE — 80053 COMPREHEN METABOLIC PANEL: CPT | Performed by: INTERNAL MEDICINE

## 2022-06-13 PROCEDURE — 1125F AMNT PAIN NOTED PAIN PRSNT: CPT | Mod: CPTII,S$GLB,, | Performed by: INTERNAL MEDICINE

## 2022-06-13 PROCEDURE — 3078F PR MOST RECENT DIASTOLIC BLOOD PRESSURE < 80 MM HG: ICD-10-PCS | Mod: CPTII,S$GLB,, | Performed by: INTERNAL MEDICINE

## 2022-06-13 PROCEDURE — 3008F PR BODY MASS INDEX (BMI) DOCUMENTED: ICD-10-PCS | Mod: CPTII,S$GLB,, | Performed by: INTERNAL MEDICINE

## 2022-06-13 PROCEDURE — 3066F NEPHROPATHY DOC TX: CPT | Mod: CPTII,S$GLB,, | Performed by: INTERNAL MEDICINE

## 2022-06-13 PROCEDURE — 36415 COLL VENOUS BLD VENIPUNCTURE: CPT | Performed by: INTERNAL MEDICINE

## 2022-06-13 NOTE — PROGRESS NOTES
Subjective:       Patient ID: Annika Mac is a 71 y.o. female.    Chief Complaint: No chief complaint on file.    Anemia       Mrs. Mac returns today for follow up.  I had last seen her a year ago and had asked her for a 6 month follow up, however, it is only today that she presents for follow-up..  Briefly, she is a 72 yo morbidly obese female who comes today for follow up for her longstanding anemia.    Multiple stool samples in the past have been negative for occult blood.   Of note, she had an EGD, colonoscopy, and video capsule swallow 4 years ago.  A tubular adenoma was removed from her colon while her EGD had shown patchy mildly erythematous mucosa without bleeding in the gastric body.  Biopsies were negative for malignancy.  The video capsule swallow was unremarkable although it was not an optimal study.    His CBC today shows a white count of 4,800 per cubic mm, hemoglobin 9.1 grams/deciliter, hematocrit 29.0 %, MCV 92, and platelets are 208,000 per cubic mm.  Her MCV is 94.  Her ferritin is pending.  Creatinine is 1.9 mg per dl and her BUN is 36.  Her ferritin is 490 ng/mL.    She was supposed to submit 3 stool samples today, however, she states that she forgot to do so.  She states that she has been taking 1 iron pill a day.    Review of Systems  Overall she feels fair. She again complains of her longstanding arthritic pains involving primarily her knees and her hips.  She uses a wheelchair for ambulation.   She also complains of severe constipation.  She denies any anxiety, depression, easy bruising, fevers, chills, night sweats, weight loss, nausea, vomiting, diarrhea, diplopia, blurred vision, epistaxis, hematuria, or headaches.      Objective:      Physical Exam  GENERAL: She is alert, oriented to time, place, person, pleasant, well nourished, in no acute physical distress.  She is able to climb to the examination table with assistance.  VITAL SIGNS: Reviewed.   HEENT: Normal. There are no nasal,  oral, lip, gingival, auricular, lid, conjunctival lesions. Pupils are equal, reactive to light and   accommodation and extraocular muscle movements are intact. Mucosae are moist and pink, and there is no thrush.  Maxillary teeth are missing.  NECK: Supple without JVD, adenopathy, or thyromegaly.   LUNGS: Clear to auscultation without wheezing, rales, or rhonchi.   CARDIOVASCULAR: Reveals an S1, S2, no murmurs, no rubs, no gallops.   ABDOMEN: Obese, soft, nontender, without organomegaly. Bowel sounds are present. A median abdominal scar is seen extending from the umbilicus to the symphysis pubis.   EXTREMITIES: No cyanosis or clubbing. There is 1(+) edema at the ankle level bilaterally.  The left foot is wrapped.  SKIN: Does not have petechiae, rashes, induration, or ecchymoses.   NEUROLOGIC: Motor function is 5/5, DTRs are 0 to 1+ bilaterally, symmetrical, and cranial nerves within normal limits.   LYMPHATIC: There is no cervical, axillary, or supraclavicular adenopathy.       Assessment:       1. Anemia in stage 3 chronic kidney disease      2. Morbid obesity due to excess calories     3. 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 Mac.  I suspect that her anemia is multifactorial.  I asked her to discontinue the iron, submit 3 stool samples within the next 2 weeks and I will see her again in 6 months.   Her multiple questions were answered to her satisfaction.

## 2022-06-15 DIAGNOSIS — E11.42 TYPE 2 DIABETES MELLITUS WITH DIABETIC POLYNEUROPATHY, WITH LONG-TERM CURRENT USE OF INSULIN: ICD-10-CM

## 2022-06-15 DIAGNOSIS — Z79.4 TYPE 2 DIABETES MELLITUS WITH DIABETIC POLYNEUROPATHY, WITH LONG-TERM CURRENT USE OF INSULIN: ICD-10-CM

## 2022-06-15 RX ORDER — PREGABALIN 150 MG/1
150 CAPSULE ORAL 2 TIMES DAILY
Qty: 60 CAPSULE | Refills: 6 | Status: SHIPPED | OUTPATIENT
Start: 2022-06-15 | End: 2022-11-30 | Stop reason: SDUPTHER

## 2022-06-15 NOTE — PROGRESS NOTES
"    [facility]   Physical Therapy Treatment Note     Name: Annika Mac  [facility] Number: [id number]    Therapy Diagnosis:   Encounter Diagnosis   Name Primary?    Decreased range of motion of lumbar spine Yes     Physician: Evangelist Hamilton FNBINA    Visit Date: 6/16/2022    Physician Orders: PT Eval and Treat   Medical Diagnosis from Referral:   M47.816 (ICD-10-CM) - Lumbar spondylosis   M47.816 (ICD-10-CM) - Lumbar facet arthropathy   M51.36 (ICD-10-CM) - DDD (degenerative disc disease), lumbar   Evaluation Date: 4/12/2022  Authorization Period Expiration: 4/26/2022  Plan of Care Expiration: 7/12/2022  Visit # / Visits authorized: 9/ 12 (+eval)  PTA Visit #: 0     PN DUE: 6/17/2022    Precautions: Standard and Diabetes    Time In: 10:50 am   Time Out: 11:30 am  Total Billable Time: 39 minutes      SUBJECTIVE     Pt reports: she thinks she over did it with the exercises the doctor gave her.   She was compliant with home exercise program.  Response to previous treatment: no change reported   Functional change: no change reported     Pain: 5/10   Location: bilateral low back (L4-L5 level) occasional symptoms running into L LE    OBJECTIVE     Objective Measures updated at progress report unless specified.       TREATMENT   (exercises and techniques performed today are bolded)    Annika received the treatments listed below:      THERAPEUTIC EXERCISES to develop  strength, endurance, ROM, flexibility, posture and core stabilization for 25 minutes including:    +Nustep for increased lower extremity range of motion, strength, and endurance x 3 minutes (limited by knee pain)    Mat exercises performed with wedge and 1 pillow to elevate trunk      Fwd flexion with swiss ball 2 x 10*  Hamstring stretch 5 x 15"   Hip adduction with ball 10 x 10"   Belt block 2 x 10 with 3" holds   LAQ 3 x 10     posterior pelvic tilt  2 x 10 with 5" holds   LTR  x10 each way   posterior pelvic tilt marches 2 x 10  (verbal " "and tactile cues for correct form)   + posterior pelvic tilt with leg extensions x 10 each   Supine hip abduction 2 x 10 green theraband   SL clamshells 2 x 10 each   SL hip internal rotation 2 x 10     Seated posterior pelvic tilts 2 x 10   Seated thoracic rotations with wand 2 x 10 ea   Seated overhead raises with wand 2 x 10 for thoracic ext     mini squats x 10   + sit to stands from high low mat 2 x 5  seated green theraband rows for improved posture 2 x 10    MANUAL THERAPY TECHNIQUES were applied  for 15 minutes including:     MFR of L lower lumbar paraspinals (multifidi), piriformis, TFL in R sidelying   + Manual hamstring stretch and piriformis stretch       NEUROMUSCULAR RE-EDUCATION ACTIVITIES to improve Balance, Coordination, Kinesthetic, Sense, Proprioception and Posture for 0 minutes.  The following were included:   + cone walking   + Side stepping 5 laps    + Heel-toe raises standing 2 x 30" each   + Marches on foam 3 x 30"        PATIENT EDUCATION AND HOME EXERCISES     Home Exercises Provided and Patient Education Provided     Education provided:   - educated pt that mild soreness is an expected outcome to therapy session  - educated pt to continue with HEP as issued and prescribed    Written Home Exercises Provided: Patient instructed to cont prior HEP. Exercises were reviewed and Annika was able to demonstrate them prior to the end of the session.  Annika demonstrated good  understanding of the education provided. See EMR under Patient Instructions for exercises provided during therapy sessions    ASSESSMENT     Pt presents today with ongoing pain in low back and down L LE. Mini squats were progressed to sit to stands for functional strengthening for getting up from a chair. Posterior pelvic tilts with leg extensions were performed for increased core stability and flossing of sciatic nerve. Plan to continue to progress patient as tolerated and try manual lumbar traction next visit.     Annika Is " progressing well towards her goals.   Pt prognosis is Fair.     Pt will continue to benefit from skilled outpatient physical therapy to address the deficits listed in the problem list box on initial evaluation, provide pt/family education and to maximize pt's level of independence in the home and community environment.     Pt's spiritual, cultural and educational needs considered and pt agreeable to plan of care and goals.     Anticipated barriers to physical therapy: chronic pain and co-morbidities     Goals:   Short Term Goals:  4 weeks   1. Pain: Pt will demonstrate improved pain by reports of less than or equal to 5/10 worst pain on the verbal rating scale in order to progress toward maximal functional ability and improve QOL. MET 5/17/2022   2.  HEP: Patient will demonstrate independence with HEP in order to progress toward functional independence. Met 5/2/2022      Long Term Goals:  8 weeks   1. Function: Patient will demonstrate improved function as indicated by a functional limitation score of 37% on the FOTO. PC   2. Mobility: Patient will present with full lumbar active range of motion in order to return to maximal functional potential and improve quality of life. PM 5/17/2022   3. Strength: Patient will improve strength to 5/5 in bilateral lower extremities in order to improve functional independence and quality of life. PC   4. Patient will return to normal ADL's, community involvement, and recreational activities with no pain and maximal function. PC      Goals Key:  PC= progressing/continue;        PM= partially met;             DC= discontinue        PLAN     Continue PT per POC, assess tolerance to today's session,  progress exercise as tolerated and appropriate      Liz Robin, PT, DPT, PPCES   6/16/2022

## 2022-06-16 ENCOUNTER — CLINICAL SUPPORT (OUTPATIENT)
Dept: REHABILITATION | Facility: HOSPITAL | Age: 72
End: 2022-06-16
Payer: MEDICARE

## 2022-06-16 DIAGNOSIS — M53.86 DECREASED RANGE OF MOTION OF LUMBAR SPINE: Primary | ICD-10-CM

## 2022-06-16 PROCEDURE — 97110 THERAPEUTIC EXERCISES: CPT | Mod: PN

## 2022-06-16 PROCEDURE — 97140 MANUAL THERAPY 1/> REGIONS: CPT | Mod: PN

## 2022-06-17 ENCOUNTER — OFFICE VISIT (OUTPATIENT)
Dept: CARDIOLOGY | Facility: CLINIC | Age: 72
End: 2022-06-17
Payer: MEDICARE

## 2022-06-17 VITALS
HEART RATE: 73 BPM | SYSTOLIC BLOOD PRESSURE: 116 MMHG | BODY MASS INDEX: 48.82 KG/M2 | HEIGHT: 65 IN | WEIGHT: 293 LBS | DIASTOLIC BLOOD PRESSURE: 57 MMHG

## 2022-06-17 DIAGNOSIS — I12.9 TYPE 2 DM WITH CKD STAGE 4 AND HYPERTENSION: Chronic | ICD-10-CM

## 2022-06-17 DIAGNOSIS — E66.01 MORBID OBESITY: Primary | ICD-10-CM

## 2022-06-17 DIAGNOSIS — N18.4 TYPE 2 DM WITH CKD STAGE 4 AND HYPERTENSION: Chronic | ICD-10-CM

## 2022-06-17 DIAGNOSIS — N18.32 STAGE 3B CHRONIC KIDNEY DISEASE: ICD-10-CM

## 2022-06-17 DIAGNOSIS — R42 DIZZINESS: ICD-10-CM

## 2022-06-17 DIAGNOSIS — G47.33 OSA (OBSTRUCTIVE SLEEP APNEA): ICD-10-CM

## 2022-06-17 DIAGNOSIS — I51.89 DIASTOLIC DYSFUNCTION: Chronic | ICD-10-CM

## 2022-06-17 DIAGNOSIS — I10 ESSENTIAL HYPERTENSION: ICD-10-CM

## 2022-06-17 DIAGNOSIS — E11.22 TYPE 2 DM WITH CKD STAGE 4 AND HYPERTENSION: Chronic | ICD-10-CM

## 2022-06-17 DIAGNOSIS — I77.819 ECTATIC AORTA: ICD-10-CM

## 2022-06-17 PROCEDURE — 99999 PR PBB SHADOW E&M-EST. PATIENT-LVL V: ICD-10-PCS | Mod: PBBFAC,GC,, | Performed by: INTERNAL MEDICINE

## 2022-06-17 PROCEDURE — 99214 OFFICE O/P EST MOD 30 MIN: CPT | Mod: GC,S$GLB,, | Performed by: INTERNAL MEDICINE

## 2022-06-17 PROCEDURE — 99214 PR OFFICE/OUTPT VISIT, EST, LEVL IV, 30-39 MIN: ICD-10-PCS | Mod: GC,S$GLB,, | Performed by: INTERNAL MEDICINE

## 2022-06-17 PROCEDURE — 99999 PR PBB SHADOW E&M-EST. PATIENT-LVL V: CPT | Mod: PBBFAC,GC,, | Performed by: INTERNAL MEDICINE

## 2022-06-17 PROCEDURE — 3052F PR MOST RECENT HEMOGLOBIN A1C LEVEL 8.0 - < 9.0%: ICD-10-PCS | Mod: CPTII,GC,S$GLB, | Performed by: INTERNAL MEDICINE

## 2022-06-17 PROCEDURE — 3052F HG A1C>EQUAL 8.0%<EQUAL 9.0%: CPT | Mod: CPTII,GC,S$GLB, | Performed by: INTERNAL MEDICINE

## 2022-06-17 NOTE — ASSESSMENT & PLAN NOTE
Grade 1 DD reported in last echo  2+ pitting edema in lower extremities, I suspect dependent edema as etiology  Lungs clear and no elevation of JVP  Given body habitus physical exam assessment has less sensitivity for volume overload

## 2022-06-17 NOTE — ASSESSMENT & PLAN NOTE
"Hypertension:  -BP today: BP (!) 116/57 (BP Location: Left arm, Patient Position: Sitting, BP Method: Medium (Automatic))   Pulse 73   Ht 5' 5" (1.651 m)   Wt 134.5 kg (296 lb 8.3 oz)   BMI 49.34 kg/m²   -HTN well controlled, pt advised to continue current management of norvasc, irbesartan, labetalol, and chlorthalidone  -Pt advised to be compliant with their treatment regimen daily to avoid BP fluctuation and any complications.   -Pt advised to monitor their blood pressure regularly and bring their recorded results to next office visit.  -Pt educated that it is important to eat right. Following a reasonable-calorie low-salt diet (Such as the DASH diet) has been shown to control blood pressure better with less medications. Recommended to exercise at least 30 mins a day for 5 days a week and also told to avoid smoking all together.      "

## 2022-06-17 NOTE — PROGRESS NOTES
MD Angel Alvarez MD in [address] on 8/27/2019  Kimo Fan MD in [address] on 3/4/2022  DOBUTAMINE, ECHO in [facility] - Echo / Stress Lab on 3/14/2022  Peter Hodges MD in [address] on 6/17/2022    Subjective:   Patient ID:  Annika Mac is a 71 y.o. female who presents for follow-up of Follow-up and Dizziness (Left side of head hurts)    Annika Mac is a 71 y.o. y.o. female with a obesity, CVA in 2003, chronic low back pain, RUFINA on CPAP intermittently, diabetes mellitus, hypertension, hyperlipidemia, and remote history of DVT after left knee replacement who presents to the clinic today for scheduled follow up. Her main complaint today is dizziness upon standing, as well as sitting up after leaning forward however denies any syncope. She reports that she is not very active, due to chronic pain including her back, hip and knee. She has seen nephrology in the past who ordered a renal US, which was negative.  Patient also complains of lower extremity edema bilaterally which he states is chronic and unchanged.  Her lower extremity edema about a week ago was worsened it is now.  She is compliant with her medications.     She was previously assessed for MCFADDEN and SOB after her last visit with Cardiology. A DSE was ordered and negative. She has had difficult to control blood pressure, but it is currently well controlled on the following 4 agents: amlodipine 10 mg daily, irbesartan 300 mg daily labetalol 300 mg b.i.d. and chlorthalidone 25 mg daily.      Orthostatics performed in clinic: 128/54 HR 74 lying down (dizzy), 122/60 HR 74 standing (fatigued), and 128/50 HR 72 (dizzy).    Review of Systems   Constitutional: Negative for chills, decreased appetite and fever.   HENT: Negative for congestion and sore throat.    Eyes: Negative for blurred vision and discharge.   Cardiovascular: Negative for chest pain, claudication, cyanosis,  dyspnea on exertion and leg swelling.   Respiratory: Negative for cough, hemoptysis and shortness of breath.    Endocrine: Negative for cold intolerance and heat intolerance.   Skin: Negative for color change.   Musculoskeletal: Positive for back pain and joint pain. Negative for muscle weakness and myalgias.   Gastrointestinal: Negative for bloating and abdominal pain.   Neurological: Positive for dizziness and light-headedness. Negative for focal weakness and weakness.   Psychiatric/Behavioral: Negative for altered mental status and depression.       Past Medical History:   Diagnosis Date    Asthma     Chronic obstructive pulmonary disease, unspecified COPD type 2022    Constipation 10/3/2019    Deep vein thrombophlebitis of left leg 2012    Deep vein thrombosis     when she had a knee replacement    Encounter for blood transfusion     anemic     GERD (gastroesophageal reflux disease)     Obesity, diabetes, and hypertension syndrome 2019    Obstructive sleep apnea 2012    PAD (peripheral artery disease) 2013    Pressure ulcer of toe of left foot     Type 2 diabetes mellitus with obesity 2020    Type 2 diabetes mellitus with peripheral artery disease 2020       Past Surgical History:   Procedure Laterality Date    CATARACT EXTRACTION W/  INTRAOCULAR LENS IMPLANT Left 3/2002    left     CATARACT EXTRACTION W/  INTRAOCULAR LENS IMPLANT Right     OD dr. olivares     SECTION      COLONOSCOPY N/A 2018    Procedure: COLONOSCOPY;  Surgeon: Faizan Mac MD;  Location: [facility] ([address] [place] [address]);  Service: Endoscopy;  Laterality: N/A;    CYST REMOVAL  2011    left side of face    CYSTOSCOPY W/ RETROGRADES Left 2020    Procedure: CYSTOSCOPY, WITH RETROGRADE PYELOGRAM;  Surgeon: Noman Rivas MD;  Location: [address];  Service: Urology;  Laterality: Left;  30min    DE QUERVAIN'S RELEASE  2021    Procedure: RELEASE, HAND, FOR  DEQUERVAIN'S TENOSYNOVITIS;  Surgeon: Marky Hagen MD;  Location: [facility] OR;  Service: Orthopedics;;    EYE SURGERY Bilateral 2012    eye implants    GANGLION CYST EXCISION  11/13/2007    Right wrist    INJECTION OF ANESTHETIC AGENT AROUND MEDIAL BRANCH NERVES INNERVATING LUMBAR FACET JOINT Bilateral 4/5/2022    Procedure: Block-nerve-medial branch-lumbar bilateral L4-5 and L5-S1;  Surgeon: Alireza Meraz Jr., MD;  Location: [facility];  Service: Pain Management;  Laterality: Bilateral;  pt is diabetic   asa    INJECTION OF ANESTHETIC AGENT AROUND MEDIAL BRANCH NERVES INNERVATING LUMBAR FACET JOINT Bilateral 4/19/2022    Procedure: Block-nerve-medial branch-lumbar bilaterl L4-5 and L5-S1;  Surgeon: Alireza Meraz Jr., MD;  Location: [facility];  Service: Pain Management;  Laterality: Bilateral;  pt is diabetic     INJECTION OF FACET JOINT Bilateral 5/6/2021    Procedure: INJECTION, FACET JOINT--Bilateral L4-5, L5-S1;  Surgeon: Alireza Meraz Jr., MD;  Location: [facility];  Service: Pain Management;  Laterality: Bilateral;  Oral    INTERPOSITION ARTHROPLASTY OF CARPOMETACARPAL JOINTS Left 1/8/2021    Procedure: INTERPOSITION ARTHROPLASTY, CMC JOINT - left dequervains and cmc arthroplasty, arthrex;  Surgeon: Marky Hagen MD;  Location: [facility] OR;  Service: Orthopedics;  Laterality: Left;    JOINT REPLACEMENT Left     Pan Retinal Photocoagulation Bilateral     Dr. Monterroso (Proliferative Diabetic Retinopathy)    RADIOFREQUENCY THERMOCOAGULATION Right 5/12/2022    Procedure: RADIOFREQUENCY THERMAL COAGULATION RIGHT L4-5, L5-S1 (IV Sedation);  Surgeon: Alireza Meraz Jr., MD;  Location: [facility];  Service: Pain Management;  Laterality: Right;  diabetic    RADIOFREQUENCY THERMOCOAGULATION Left 5/19/2022    Procedure: RADIOFREQUENCY THERMAL COAGULATION LEFT L4-5, L5-S1 (IV Sedation);  Surgeon: Alireza Meraz Jr., MD;  Location: [facility];  Service: Pain Management;  Laterality:  "Left;  diabetic    TOTAL ABDOMINAL HYSTERECTOMY  1982    TOTAL KNEE ARTHROPLASTY  2/2006    left    TRIGGER FINGER RELEASE  9/18/2009       Family History   Problem Relation Age of Onset    Diabetes Mother     Hypertension Mother     Heart disease Father     Diabetes Sister     Thyroid disease Brother     Diabetes Brother     Hypertension Brother     No Known Problems Daughter     No Known Problems Son     No Known Problems Daughter     Amblyopia Neg Hx     Blindness Neg Hx     Glaucoma Neg Hx     Macular degeneration Neg Hx     Retinal detachment Neg Hx     Strabismus Neg Hx     Colon cancer Neg Hx     Esophageal cancer Neg Hx          Current Outpatient Medications:     albuterol (PROAIR HFA) 90 mcg/actuation inhaler, Inhale 2 puffs into the lungs every 6 (six) hours as needed for Wheezing. Rescue, Disp: 18 g, Rfl: 6    allopurinoL (ZYLOPRIM) 300 MG tablet, Take 1 tablet (300 mg total) by mouth once daily., Disp: 90 tablet, Rfl: 2    amLODIPine (NORVASC) 10 MG tablet, Take 1 tablet (10 mg total) by mouth once daily., Disp: 90 tablet, Rfl: 3    ammonium lactate 12 % Crea, Apply twice daily to affected parts both feet as needed., Disp: 140 g, Rfl: 11    aspirin 81 MG Chew, Take 1 tablet (81 mg total) by mouth once daily., Disp: , Rfl: 0    BD ULTRA-FINE KIKA PEN NEEDLE 32 gauge x 5/32" Ndle, To use 4 times daily, Disp: 200 each, Rfl: 20    blood sugar diagnostic Strp, 1 strip 4 times daily. Contour Next., Disp: 200 strip, Rfl: 11    blood-glucose meter kit, 1 each by Other route once daily. Use daily. Insurance proffered one touch  E11.42, Disp: 1 each, Rfl: 0    chlorthalidone (HYGROTEN) 25 MG Tab, Take 1 tablet (25 mg total) by mouth once daily., Disp: 30 tablet, Rfl: 11    colchicine (COLCRYS) 0.6 mg tablet, TAKE 1 TABLET EVERY OTHER DAY, Disp: 45 tablet, Rfl: 3    DULoxetine (CYMBALTA) 30 MG capsule, TAKE 1 CAPSULE BY MOUTH  ONCE DAILY, Disp: 90 capsule, Rfl: 3    famotidine " (PEPCID) 20 MG tablet, Take 1 tablet (20 mg total) by mouth 2 (two) times daily., Disp: 1 tablet, Rfl: 0    glucagon (GVOKE HYPOPEN 1-PACK) 0.5 mg/0.1 mL AtIn, Inject 1 Dose into the skin daily as needed (for severe hypoglycemia if you aren't able to treat by ingesting sugar)., Disp: 1 Syringe, Rfl: 3    insulin degludec (TRESIBA FLEXTOUCH U-100) 100 unit/mL (3 mL) insulin pen, Inject 35 Units into the skin once daily., Disp: 15 pen, Rfl: 3    insulin lispro (HUMALOG KWIKPEN INSULIN) 100 unit/mL pen, Inject 11 Units into the skin 3 (three) times daily before meals. Plus sliding scale - max TDD 63 units., Disp: 30 mL, Rfl: 3    irbesartan (AVAPRO) 300 MG tablet, Take 1 tablet (300 mg total) by mouth every evening., Disp: 90 tablet, Rfl: 3    labetaloL (NORMODYNE) 300 MG tablet, Take 1 tablet (300 mg total) by mouth 2 (two) times daily., Disp: 180 tablet, Rfl: 6    lancets 31 gauge Misc, 1 lancet by Misc.(Non-Drug; Combo Route) route 4 (four) times daily. E11.42 . Prescribed one touch, Disp: 200 each, Rfl: 6    multivitamin (THERAGRAN) per tablet, Take 1 tablet by mouth once daily., Disp: , Rfl:     pregabalin (LYRICA) 150 MG capsule, Take 1 capsule (150 mg total) by mouth 2 (two) times daily., Disp: 60 capsule, Rfl: 6    semaglutide (OZEMPIC) 1 mg/dose (4 mg/3 mL), Inject 1 mg into the skin every 7 days., Disp: 3 pen, Rfl: 3    simvastatin (ZOCOR) 40 MG tablet, Take 1 tablet (40 mg total) by mouth every evening., Disp: 90 tablet, Rfl: 3    ferrous sulfate (FEOSOL) Tab tablet, Take 1 tablet (1 each total) by mouth once daily. (Patient not taking: Reported on 6/17/2022), Disp: 90 tablet, Rfl: 6    sodium bicarbonate 325 MG tablet, Take 2 tablets (650 mg total) by mouth 2 (two) times daily., Disp: 120 tablet, Rfl: 11  No current facility-administered medications for this visit.    Facility-Administered Medications Ordered in Other Visits:     fentaNYL injection 25 mcg, 25 mcg, Intravenous, Q5 Min PRN,  "Desmond Fontana MD, 50 mcg at 01/08/21 0955    midazolam (VERSED) 1 mg/mL injection 0.5 mg, 0.5 mg, Intravenous, PRN, Desmond Fontana MD, 2 mg at 01/08/21 0955  Objective:   BP (!) 116/57 (BP Location: Left arm, Patient Position: Sitting, BP Method: Medium (Automatic))   Pulse 73   Ht 5' 5" (1.651 m)   Wt 134.5 kg (296 lb 8.3 oz)   BMI 49.34 kg/m²      Physical Exam  Constitutional:       Appearance: She is well-developed. She is obese.      Comments: bmi 49   HENT:      Head: Normocephalic and atraumatic.      Right Ear: External ear normal.      Left Ear: External ear normal.   Eyes:      Conjunctiva/sclera: Conjunctivae normal.   Cardiovascular:      Rate and Rhythm: Normal rate and regular rhythm.      Pulses: Intact distal pulses.           Radial pulses are 2+ on the right side and 2+ on the left side.      Heart sounds: Normal heart sounds.   Pulmonary:      Effort: Pulmonary effort is normal. No respiratory distress.      Breath sounds: Normal breath sounds. No wheezing.   Abdominal:      General: Bowel sounds are normal. There is no distension.      Palpations: Abdomen is soft.      Tenderness: There is no abdominal tenderness.   Musculoskeletal:         General: Normal range of motion.      Cervical back: Normal range of motion and neck supple.      Right lower leg: Edema (2+) present.      Left lower leg: Edema (2+) present.   Skin:     General: Skin is warm and dry.   Neurological:      Mental Status: She is alert and oriented to person, place, and time.   Psychiatric:         Mood and Affect: Mood normal.         Behavior: Behavior normal.         Lab Results   Component Value Date     06/13/2022    K 4.3 06/13/2022     06/13/2022    CO2 25 06/13/2022    BUN 36 (H) 06/13/2022    CREATININE 1.9 (H) 06/13/2022    ANIONGAP 8 06/13/2022     Lab Results   Component Value Date    HGBA1C 8.1 (H) 04/11/2022     No results found for: BNP, BNPTRIAGEBLO    Lab Results " "  Component Value Date    WBC 4.87 06/13/2022    WBC 4.95 06/13/2022    HGB 9.1 (L) 06/13/2022    HGB 9.1 (L) 06/13/2022    HCT 29.0 (L) 06/13/2022    HCT 28.7 (L) 06/13/2022     06/13/2022     06/13/2022    GRAN 2.7 06/13/2022    GRAN 54.5 06/13/2022    GRAN 2.7 06/13/2022     Lab Results   Component Value Date    CHOL 138 01/07/2022    HDL 38 (L) 01/07/2022    LDLCALC 80.0 01/07/2022    TRIG 100 01/07/2022        Assessment:     1. Essential hypertension    2. Stage 3b chronic kidney disease    3. 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 Diastolic dysfunction    5. RUFINA (obstructive sleep apnea)    6. Dizziness        Plan:     Dizziness  Pt reports persistent symptoms with standing  Negative orthostatics and patient felt dizzy going from sitting to laying down in clinic  Suspect severe deconditioning as etiology  Discussed reducing blood pressure medications as possible alternative, but we'd run the risk of uncontrolled HTN again. Will continue current meds at this time and encouraged patient to gradually increase her exercise goals.    Essential hypertension  Hypertension:  -BP today: BP (!) 116/57 (BP Location: Left arm, Patient Position: Sitting, BP Method: Medium (Automatic))   Pulse 73   Ht 5' 5" (1.651 m)   Wt 134.5 kg (296 lb 8.3 oz)   BMI 49.34 kg/m²   -HTN well controlled, pt advised to continue current management of norvasc, irbesartan, labetalol, and chlorthalidone  -Pt advised to be compliant with their treatment regimen daily to avoid BP fluctuation and any complications.   -Pt advised to monitor their blood pressure regularly and bring their recorded results to next office visit.  -Pt educated that it is important to eat right. Following a reasonable-calorie low-salt diet (Such as the DASH diet) has been shown to control blood pressure better with less medications. Recommended to exercise at least 30 mins a day for 5 days a week and also told to avoid smoking all " together.    Stage 3b chronic kidney disease  Follow up with nephrology     Type 2 DM with CKD stage 4 and hypertension  Management per pcp  Increases cardiovascular risk profile    Diastolic dysfunction  Grade 1 DD reported in last echo  2+ pitting edema in lower extremities, I suspect dependent edema as etiology  Lungs clear and no elevation of JVP  Given body habitus physical exam assessment has less sensitivity for volume overload    RUFINA (obstructive sleep apnea)  Reports compliance with cpap          Patient seen and examined with attending Cardiologist, Dr. Clement, who agrees with management and plan.    Peter Hodges MD  Cardiovascular Disease Fellow PGY V  Pager [phone]

## 2022-06-17 NOTE — ASSESSMENT & PLAN NOTE
Pt reports persistent symptoms with standing  Suspect severe deconditioning as etiology  Discussed reducing blood pressure medications as possible alternative, but we'd run the risk of uncontrolled HTN again. Will continue current meds at this time and encouraged patient to gradually increase her exercise goals.

## 2022-06-17 NOTE — PROGRESS NOTES
I have reviewed the patient's chart and the fellow's clinic note, as well as discussed the case with the fellow. I have personally spoken with and examined the patient in the clinic. I agree with the findings, assessment, and plan.      Eyad Clement MD  Consultative Cardiology - [facility]  .

## 2022-06-21 ENCOUNTER — CLINICAL SUPPORT (OUTPATIENT)
Dept: REHABILITATION | Facility: HOSPITAL | Age: 72
End: 2022-06-21
Payer: MEDICARE

## 2022-06-21 DIAGNOSIS — M53.86 DECREASED RANGE OF MOTION OF LUMBAR SPINE: Primary | ICD-10-CM

## 2022-06-21 PROCEDURE — 97110 THERAPEUTIC EXERCISES: CPT | Mod: PN,CQ

## 2022-06-21 PROCEDURE — 97140 MANUAL THERAPY 1/> REGIONS: CPT | Mod: PN,CQ

## 2022-06-21 NOTE — PROGRESS NOTES
"    [facility]   Physical Therapy Treatment Note     Name: Annika Mac  [facility] Number: [id number]    Therapy Diagnosis:   Encounter Diagnosis   Name Primary?    Decreased range of motion of lumbar spine Yes     Physician: Evangelist Hamilton FNP    Visit Date: 6/21/2022    Physician Orders: PT Eval and Treat   Medical Diagnosis from Referral:   M47.816 (ICD-10-CM) - Lumbar spondylosis   M47.816 (ICD-10-CM) - Lumbar facet arthropathy   M51.36 (ICD-10-CM) - DDD (degenerative disc disease), lumbar   Evaluation Date: 4/12/2022  Authorization Period Expiration: 4/26/2022  Plan of Care Expiration: 7/12/2022  Visit # / Visits authorized: 10/ 12 (+eval)  PTA Visit #: 1     PN DUE: 6/17/2022    Precautions: Standard and Diabetes    Time In: 12:45pm   Time Out: 1:30pm  Total Billable Time: 45 minutes      SUBJECTIVE     Pt reports: Low back is feeling pretty good today, just her R knee that is hurting her the most now.  She was compliant with home exercise program.  Response to previous treatment: no change reported   Functional change: no change reported     Pain: 5/10   Location: bilateral low back (L4-L5 level) occasional symptoms running into L LE    OBJECTIVE     Objective Measures updated at progress report unless specified.       TREATMENT   (exercises and techniques performed today are bolded)    Annika received the treatments listed below:      THERAPEUTIC EXERCISES to develop  strength, endurance, ROM, flexibility, posture and core stabilization for 30 minutes including:    Nustep for increased lower extremity range of motion, strength, and endurance x 6 minutes (limited by knee pain)    Mat exercises performed with wedge and 1 pillow to elevate trunk      Fwd flexion with swiss ball 2 x 10*  Hamstring stretch 5 x 15"   Hip adduction with ball 10 x 10"   Belt block 2 x 10 with 3" holds   LAQ 3 x 10     posterior pelvic tilt  2 x 10 with 5" holds   LTR  x10 each way   posterior pelvic tilt " "marches 2 x 10  (verbal and tactile cues for correct form)   posterior pelvic tilt with leg extensions x 10 each   Supine hip abduction 2 x 10 green theraband   SL clamshells 2 x 10 each   SL hip internal rotation 2 x 10     Seated posterior pelvic tilts 2 x 10   Seated thoracic rotations with wand 2 x 10 ea   Seated overhead raises with wand 2 x 10 for thoracic ext     mini squats x 10   + sit to stands from high low mat 2 x 5  seated green theraband rows for improved posture 2 x 10    MANUAL THERAPY TECHNIQUES were applied  for 15 minutes including:     MFR of L lower lumbar paraspinals (multifidi), piriformis, TFL in R sidelying   Manual hamstring stretch and piriformis stretch       NEUROMUSCULAR RE-EDUCATION ACTIVITIES to improve Balance, Coordination, Kinesthetic, Sense, Proprioception and Posture for 0 minutes.  The following were included:   + cone walking   + Side stepping 5 laps    + Heel-toe raises standing 2 x 30" each   + Marches on foam 3 x 30"        PATIENT EDUCATION AND HOME EXERCISES     Home Exercises Provided and Patient Education Provided     Education provided:   - educated pt that mild soreness is an expected outcome to therapy session  - educated pt to continue with HEP as issued and prescribed    Written Home Exercises Provided: Patient instructed to cont prior HEP. Exercises were reviewed and Annika was able to demonstrate them prior to the end of the session.  Annika demonstrated good  understanding of the education provided. See EMR under Patient Instructions for exercises provided during therapy sessions    ASSESSMENT     Pt presents today reporting minimal low back pain, her main c/o at this point is her R knee. Applied stretching to hamstrings and piriformis to good response, noted high tension limiting piriformis extensibility which improved after stretching. Applied patellar distraction and mobilizations to good response, pt reported decrease in her knee pain afterwards as well as " improvement in her knee range of motion. Pt tends to externally rotate at he hip during exercises and on the Nustep with jolts of pain while twisting, educated pt on paying close attention to her rotation and avoiding twisting at her knee.    Annika Is progressing well towards her goals.   Pt prognosis is Fair.     Pt will continue to benefit from skilled outpatient physical therapy to address the deficits listed in the problem list box on initial evaluation, provide pt/family education and to maximize pt's level of independence in the home and community environment.     Pt's spiritual, cultural and educational needs considered and pt agreeable to plan of care and goals.     Anticipated barriers to physical therapy: chronic pain and co-morbidities     Goals:   Short Term Goals:  4 weeks   1. Pain: Pt will demonstrate improved pain by reports of less than or equal to 5/10 worst pain on the verbal rating scale in order to progress toward maximal functional ability and improve QOL. MET 5/17/2022   2.  HEP: Patient will demonstrate independence with HEP in order to progress toward functional independence. Met 5/2/2022      Long Term Goals:  8 weeks   1. Function: Patient will demonstrate improved function as indicated by a functional limitation score of 37% on the FOTO. PC   2. Mobility: Patient will present with full lumbar active range of motion in order to return to maximal functional potential and improve quality of life. PM 5/17/2022   3. Strength: Patient will improve strength to 5/5 in bilateral lower extremities in order to improve functional independence and quality of life. PC   4. Patient will return to normal ADL's, community involvement, and recreational activities with no pain and maximal function. PC      Goals Key:  PC= progressing/continue;        PM= partially met;             DC= discontinue        PLAN     Continue PT per POC, assess tolerance to today's session,  progress exercise as tolerated and  appropriate      Elijah Bhavya, PTA  6/21/2022

## 2022-06-22 NOTE — PROGRESS NOTES
[facility]   Physical Therapy Treatment Note     Name: Annika Mac  [facility] Number: [id number]    Therapy Diagnosis:   Encounter Diagnosis   Name Primary?    Decreased range of motion of lumbar spine Yes     Physician: Evangelist Hamilton FNP    Visit Date: 6/23/2022    Physician Orders: PT Eval and Treat   Medical Diagnosis from Referral:   M47.816 (ICD-10-CM) - Lumbar spondylosis   M47.816 (ICD-10-CM) - Lumbar facet arthropathy   M51.36 (ICD-10-CM) - DDD (degenerative disc disease), lumbar   Evaluation Date: 4/12/2022  Authorization Period Expiration: 4/26/2022  Plan of Care Expiration: 7/12/2022  Visit # / Visits authorized: 11/ 12 (+eval)  PTA Visit #: 2     PN DUE: 7/23/2022    Precautions: Standard and Diabetes    Time In: 11:20am   Time Out: 12:00pm  Total Billable Time: 40 minutes      SUBJECTIVE     Pt reports: Wants to avoid using the bike today due to knee pain. She will not be able to come to therapy for the next 2 weeks, and will call when/if she feels she needs to be on the schedule again.  She was compliant with home exercise program.  Response to previous treatment: no change reported   Functional change: no change reported     Pain: 5/10   Location: bilateral low back (L4-L5 level) occasional symptoms running into L LE    OBJECTIVE     Objective Measures updated at progress report unless specified.     Lumbar Range of Motion:     % of normal motion Pain   Flexion 100%           Extension 100%    Still slightly painful         Left Side Bending 100%           Right Side Bending 100%           Left rotation    100%           Right Rotation    90%                 Lower Extremity Strength  Right LE   Left LE     Knee extension: 5/5 Knee extension: 5/5   Knee flexion: 5/5 Knee flexion: 5/5   Hip flexion: 5/5 Hip flexion: 5/5   Hip abduction: 5/5 Hip abduction: 5/5   Hip adduction: 5/5 Hip adduction 5/5   Ankle dorsiflexion: 5/5 Ankle dorsiflexion: 5/5   Ankle plantarflexion: 5/5  "Ankle plantarflexion: 5/5     FOTO: 58% LIMITATION    TREATMENT   (exercises and techniques performed today are bolded)    Annika received the treatments listed below:      THERAPEUTIC EXERCISES to develop  strength, endurance, ROM, flexibility, posture and core stabilization for 35 minutes including:    Nustep for increased lower extremity range of motion, strength, and endurance x 6 minutes (limited by knee pain)    Mat exercises performed with wedge and 1 pillow to elevate trunk      Fwd flexion with swiss ball 2 x 10*  Hamstring stretch 5 x 15"   Hip adduction with ball 10 x 10"   Belt block 2 x 10 with 3" holds   LAQ 3 x 10     posterior pelvic tilt  2 x 10 with 5" holds   LTR  x10 each way   posterior pelvic tilt marches 2 x 10  (verbal and tactile cues for correct form)   posterior pelvic tilt with leg extensions x 10 each   Supine hip abduction 2 x 10 green theraband   SL clamshells 2 x 10 each   SL hip internal rotation 2 x 10     Seated posterior pelvic tilts 2 x 10   Seated thoracic rotations with wand 2 x 10 ea   Seated overhead raises with wand 2 x 10 for thoracic ext     mini squats x 10   + sit to stands from high low mat 2 x 5  seated green theraband rows for improved posture 2 x 10    MANUAL THERAPY TECHNIQUES were applied  for 10 minutes including:     MFR of L lower lumbar paraspinals (multifidi), piriformis, TFL in R sidelying   Manual hamstring stretch and piriformis stretch   Manual lumbar traction in hooklying      NEUROMUSCULAR RE-EDUCATION ACTIVITIES to improve Balance, Coordination, Kinesthetic, Sense, Proprioception and Posture for 0 minutes.  The following were included:   + cone walking   + Side stepping 5 laps    + Heel-toe raises standing 2 x 30" each   + Marches on foam 3 x 30"        PATIENT EDUCATION AND HOME EXERCISES     Home Exercises Provided and Patient Education Provided     Education provided:   - educated pt that mild soreness is an expected outcome to therapy session  - " educated pt to continue with HEP as issued and prescribed    Written Home Exercises Provided: Patient instructed to cont prior HEP. Exercises were reviewed and Annika was able to demonstrate them prior to the end of the session.  Annika demonstrated good  understanding of the education provided. See EMR under Patient Instructions for exercises provided during therapy sessions    ASSESSMENT     Pt presents today reporting ongoing pain in her low back and L LE. Strength and range of motion are both showing good improvements, meeting each of her goals. Pt's pain remains a limiting factor for her though, she reports knee and back pain are equally limiting for her. Pt has met 2 more goals today and is continuing to show progress. Pt's pain has reached a plateau recently, and she will be going on a 2 week hiatus to help her  during his surgery. Pt will continue to benefit from further skilled PT in order to return to her prior level of function.    *PT agrees with PTA assessment listed above. Patient has met 4 out of 6 goals and will continue to benefit from therapy to continue to progress toward goals.        Annika Is progressing well towards her goals.   Pt prognosis is Fair.     Pt will continue to benefit from skilled outpatient physical therapy to address the deficits listed in the problem list box on initial evaluation, provide pt/family education and to maximize pt's level of independence in the home and community environment.     Pt's spiritual, cultural and educational needs considered and pt agreeable to plan of care and goals.     Anticipated barriers to physical therapy: chronic pain and co-morbidities     Goals:   Short Term Goals:  4 weeks   1. Pain: Pt will demonstrate improved pain by reports of less than or equal to 5/10 worst pain on the verbal rating scale in order to progress toward maximal functional ability and improve QOL. MET 5/17/2022   2.  HEP: Patient will demonstrate independence with HEP in  order to progress toward functional independence. Met 5/2/2022      Long Term Goals:  8 weeks   1. Function: Patient will demonstrate improved function as indicated by a functional limitation score of 37% on the FOTO. PC   2. Mobility: Patient will present with full lumbar active range of motion in order to return to maximal functional potential and improve quality of life. Met 6/23/2022   3. Strength: Patient will improve strength to 5/5 in bilateral lower extremities in order to improve functional independence and quality of life. Met 6/23/2022   4. Patient will return to normal ADL's, community involvement, and recreational activities with no pain and maximal function. PC      Goals Key:  PC= progressing/continue;        PM= partially met;             DC= discontinue        PLAN     Continue PT per POC, assess tolerance to today's session,  progress exercise as tolerated and appropriate      Elijah Latif PTA  6/23/2022    *I agree with measures and assessment above   Liz Robin, PT, DPT

## 2022-06-23 ENCOUNTER — CLINICAL SUPPORT (OUTPATIENT)
Dept: REHABILITATION | Facility: HOSPITAL | Age: 72
End: 2022-06-23
Payer: MEDICARE

## 2022-06-23 DIAGNOSIS — M53.86 DECREASED RANGE OF MOTION OF LUMBAR SPINE: Primary | ICD-10-CM

## 2022-06-23 PROCEDURE — 97140 MANUAL THERAPY 1/> REGIONS: CPT | Mod: PN,CQ

## 2022-06-23 PROCEDURE — 97110 THERAPEUTIC EXERCISES: CPT | Mod: PN,CQ

## 2022-06-28 ENCOUNTER — OFFICE VISIT (OUTPATIENT)
Dept: DERMATOLOGY | Facility: CLINIC | Age: 72
End: 2022-06-28
Payer: MEDICARE

## 2022-06-28 DIAGNOSIS — Z12.83 SCREENING EXAM FOR SKIN CANCER: Primary | ICD-10-CM

## 2022-06-28 DIAGNOSIS — L91.8 CUTANEOUS SKIN TAGS: ICD-10-CM

## 2022-06-28 DIAGNOSIS — D22.9 MULTIPLE BENIGN NEVI: ICD-10-CM

## 2022-06-28 DIAGNOSIS — L81.3 CAFE AU LAIT SPOTS: ICD-10-CM

## 2022-06-28 DIAGNOSIS — D23.9 DERMATOFIBROMA: ICD-10-CM

## 2022-06-28 DIAGNOSIS — L81.4 LENTIGO: ICD-10-CM

## 2022-06-28 PROCEDURE — 3288F PR FALLS RISK ASSESSMENT DOCUMENTED: ICD-10-PCS | Mod: CPTII,S$GLB,, | Performed by: DERMATOLOGY

## 2022-06-28 PROCEDURE — 3052F PR MOST RECENT HEMOGLOBIN A1C LEVEL 8.0 - < 9.0%: ICD-10-PCS | Mod: CPTII,S$GLB,, | Performed by: DERMATOLOGY

## 2022-06-28 PROCEDURE — 99213 OFFICE O/P EST LOW 20 MIN: CPT | Mod: S$GLB,,, | Performed by: DERMATOLOGY

## 2022-06-28 PROCEDURE — 3060F POS MICROALBUMINURIA REV: CPT | Mod: CPTII,S$GLB,, | Performed by: DERMATOLOGY

## 2022-06-28 PROCEDURE — 1101F PT FALLS ASSESS-DOCD LE1/YR: CPT | Mod: CPTII,S$GLB,, | Performed by: DERMATOLOGY

## 2022-06-28 PROCEDURE — 99999 PR PBB SHADOW E&M-EST. PATIENT-LVL III: ICD-10-PCS | Mod: PBBFAC,,, | Performed by: DERMATOLOGY

## 2022-06-28 PROCEDURE — 4010F ACE/ARB THERAPY RXD/TAKEN: CPT | Mod: CPTII,S$GLB,, | Performed by: DERMATOLOGY

## 2022-06-28 PROCEDURE — 1101F PR PT FALLS ASSESS DOC 0-1 FALLS W/OUT INJ PAST YR: ICD-10-PCS | Mod: CPTII,S$GLB,, | Performed by: DERMATOLOGY

## 2022-06-28 PROCEDURE — 1126F AMNT PAIN NOTED NONE PRSNT: CPT | Mod: CPTII,S$GLB,, | Performed by: DERMATOLOGY

## 2022-06-28 PROCEDURE — 99999 PR PBB SHADOW E&M-EST. PATIENT-LVL III: CPT | Mod: PBBFAC,,, | Performed by: DERMATOLOGY

## 2022-06-28 PROCEDURE — 3066F NEPHROPATHY DOC TX: CPT | Mod: CPTII,S$GLB,, | Performed by: DERMATOLOGY

## 2022-06-28 PROCEDURE — 3066F PR DOCUMENTATION OF TREATMENT FOR NEPHROPATHY: ICD-10-PCS | Mod: CPTII,S$GLB,, | Performed by: DERMATOLOGY

## 2022-06-28 PROCEDURE — 3060F PR POS MICROALBUMINURIA RESULT DOCUMENTED/REVIEW: ICD-10-PCS | Mod: CPTII,S$GLB,, | Performed by: DERMATOLOGY

## 2022-06-28 PROCEDURE — 4010F PR ACE/ARB THEARPY RXD/TAKEN: ICD-10-PCS | Mod: CPTII,S$GLB,, | Performed by: DERMATOLOGY

## 2022-06-28 PROCEDURE — 99213 PR OFFICE/OUTPT VISIT, EST, LEVL III, 20-29 MIN: ICD-10-PCS | Mod: S$GLB,,, | Performed by: DERMATOLOGY

## 2022-06-28 PROCEDURE — 1126F PR PAIN SEVERITY QUANTIFIED, NO PAIN PRESENT: ICD-10-PCS | Mod: CPTII,S$GLB,, | Performed by: DERMATOLOGY

## 2022-06-28 PROCEDURE — 3052F HG A1C>EQUAL 8.0%<EQUAL 9.0%: CPT | Mod: CPTII,S$GLB,, | Performed by: DERMATOLOGY

## 2022-06-28 PROCEDURE — 3288F FALL RISK ASSESSMENT DOCD: CPT | Mod: CPTII,S$GLB,, | Performed by: DERMATOLOGY

## 2022-06-28 NOTE — PROGRESS NOTES
"  Subjective:       Patient ID:  Annika Mac is a 71 y.o. female who presents for   Chief Complaint   Patient presents with    Skin Check     TBSE     Patient is here today for a "mole" check.   Pt has a history of  mild sun exposure in the past.   Pt recalls several blistering sunburns in the past- no  Pt has history of tanning bed use- no  Pt has  had moles removed in the past- yes-benign  Pt has history of melanoma in first degree relatives-  no      Review of Systems   Skin: Negative for daily sunscreen use and activity-related sunscreen use.   Hematologic/Lymphatic: Does not bruise/bleed easily.        Objective:    Physical Exam   Constitutional: She appears well-developed and well-nourished. No distress.   Neurological: She is alert and oriented to person, place, and time. She is not disoriented.   Psychiatric: She has a normal mood and affect.   Skin:   Areas Examined (abnormalities noted in diagram):   Scalp / Hair Palpated and Inspected  Head / Face Inspection Performed  Neck Inspection Performed  Chest / Axilla Inspection Performed  Abdomen Inspection Performed  Genitals / Buttocks / Groin Inspection Performed  Back Inspection Performed  RUE Inspected  LUE Inspection Performed  RLE Inspected  LLE Inspection Performed  Nails and Digits Inspection Performed                       Diagram Legend     Erythematous scaling macule/papule c/w actinic keratosis       Vascular papule c/w angioma      Pigmented verrucoid papule/plaque c/w seborrheic keratosis      Yellow umbilicated papule c/w sebaceous hyperplasia      Irregularly shaped tan macule c/w lentigo     1-2 mm smooth white papules consistent with Milia      Movable subcutaneous cyst with punctum c/w epidermal inclusion cyst      Subcutaneous movable cyst c/w pilar cyst      Firm pink to brown papule c/w dermatofibroma      Pedunculated fleshy papule(s) c/w skin tag(s)      Evenly pigmented macule c/w junctional nevus     Mildly variegated pigmented, " slightly irregular-bordered macule c/w mildly atypical nevus      Flesh colored to evenly pigmented papule c/w intradermal nevus       Pink pearly papule/plaque c/w basal cell carcinoma      Erythematous hyperkeratotic cursted plaque c/w SCC      Surgical scar with no sign of skin cancer recurrence      Open and closed comedones      Inflammatory papules and pustules      Verrucoid papule consistent consistent with wart     Erythematous eczematous patches and plaques     Dystrophic onycholytic nail with subungual debris c/w onychomycosis     Umbilicated papule    Erythematous-base heme-crusted tan verrucoid plaque consistent with inflamed seborrheic keratosis     Erythematous Silvery Scaling Plaque c/w Psoriasis     See annotation      Assessment / Plan:        Screening exam for skin cancer  Patient instructed in importance in daily sun protection of at least spf 30. Sun avoidance and topical protection discussed.     Recommend  for daily use on face and neck.    Patient encouraged to wear hat for all outdoor exposure.     Also discussed sun protective clothing. TASC or [employer] are good brands, UPF 50 or above. Recommend long sleeves when out in the sun.     Cutaneous skin tags  -     Ambulatory referral/consult to Dermatology  Reassurance given to patient. No treatment is necessary.   Treatment of benign, asymptomatic lesions may be considered cosmetic.    Dermatofibroma  This is a benign scar-like lesion secondary to minor trauma. No treatment required.     Lentigo  This is a benign hyperpigmented sun induced lesion. Recommend daily sun protection/avoidance and use of at least SPF 30, broad spectrum sunscreen (OTC drug) will reduce the number of new lesions. Treatment of these benign lesions are considered cosmetic.      Multiple benign nevi  TBSE body skin examination performed today including at least 12 points as noted in physical examination. No lesions suspicious for malignancy noted.  Reassurance  provided.  Instructed patient to observe lesion(s) for changes and follow up in clinic if changes are noted. Discussed ABCDE's of moles and brochure provided.    Cafe au lait spots  Reassurance given to patient. No treatment is necessary.   Treatment of benign, asymptomatic lesions may be considered cosmetic.      F/u 1 year TBSE           No follow-ups on file.

## 2022-07-13 ENCOUNTER — TELEPHONE (OUTPATIENT)
Dept: SLEEP MEDICINE | Facility: CLINIC | Age: 72
End: 2022-07-13
Payer: MEDICARE

## 2022-07-14 ENCOUNTER — OFFICE VISIT (OUTPATIENT)
Dept: SLEEP MEDICINE | Facility: CLINIC | Age: 72
End: 2022-07-14
Payer: MEDICARE

## 2022-07-14 VITALS
BODY MASS INDEX: 49.04 KG/M2 | WEIGHT: 293 LBS | DIASTOLIC BLOOD PRESSURE: 66 MMHG | HEART RATE: 76 BPM | SYSTOLIC BLOOD PRESSURE: 152 MMHG

## 2022-07-14 DIAGNOSIS — I10 ESSENTIAL HYPERTENSION: Primary | ICD-10-CM

## 2022-07-14 DIAGNOSIS — G47.33 OSA (OBSTRUCTIVE SLEEP APNEA): ICD-10-CM

## 2022-07-14 PROCEDURE — 1126F AMNT PAIN NOTED NONE PRSNT: CPT | Mod: CPTII,S$GLB,, | Performed by: NURSE PRACTITIONER

## 2022-07-14 PROCEDURE — 3008F BODY MASS INDEX DOCD: CPT | Mod: CPTII,S$GLB,, | Performed by: NURSE PRACTITIONER

## 2022-07-14 PROCEDURE — 4010F PR ACE/ARB THEARPY RXD/TAKEN: ICD-10-PCS | Mod: CPTII,S$GLB,, | Performed by: NURSE PRACTITIONER

## 2022-07-14 PROCEDURE — 3077F PR MOST RECENT SYSTOLIC BLOOD PRESSURE >= 140 MM HG: ICD-10-PCS | Mod: CPTII,S$GLB,, | Performed by: NURSE PRACTITIONER

## 2022-07-14 PROCEDURE — 3077F SYST BP >= 140 MM HG: CPT | Mod: CPTII,S$GLB,, | Performed by: NURSE PRACTITIONER

## 2022-07-14 PROCEDURE — 3288F FALL RISK ASSESSMENT DOCD: CPT | Mod: CPTII,S$GLB,, | Performed by: NURSE PRACTITIONER

## 2022-07-14 PROCEDURE — 99999 PR PBB SHADOW E&M-EST. PATIENT-LVL III: ICD-10-PCS | Mod: PBBFAC,,, | Performed by: NURSE PRACTITIONER

## 2022-07-14 PROCEDURE — 3066F NEPHROPATHY DOC TX: CPT | Mod: CPTII,S$GLB,, | Performed by: NURSE PRACTITIONER

## 2022-07-14 PROCEDURE — 1126F PR PAIN SEVERITY QUANTIFIED, NO PAIN PRESENT: ICD-10-PCS | Mod: CPTII,S$GLB,, | Performed by: NURSE PRACTITIONER

## 2022-07-14 PROCEDURE — 3066F PR DOCUMENTATION OF TREATMENT FOR NEPHROPATHY: ICD-10-PCS | Mod: CPTII,S$GLB,, | Performed by: NURSE PRACTITIONER

## 2022-07-14 PROCEDURE — 3288F PR FALLS RISK ASSESSMENT DOCUMENTED: ICD-10-PCS | Mod: CPTII,S$GLB,, | Performed by: NURSE PRACTITIONER

## 2022-07-14 PROCEDURE — 1159F PR MEDICATION LIST DOCUMENTED IN MEDICAL RECORD: ICD-10-PCS | Mod: CPTII,S$GLB,, | Performed by: NURSE PRACTITIONER

## 2022-07-14 PROCEDURE — 99999 PR PBB SHADOW E&M-EST. PATIENT-LVL III: CPT | Mod: PBBFAC,,, | Performed by: NURSE PRACTITIONER

## 2022-07-14 PROCEDURE — 99214 OFFICE O/P EST MOD 30 MIN: CPT | Mod: S$GLB,,, | Performed by: NURSE PRACTITIONER

## 2022-07-14 PROCEDURE — 3052F PR MOST RECENT HEMOGLOBIN A1C LEVEL 8.0 - < 9.0%: ICD-10-PCS | Mod: CPTII,S$GLB,, | Performed by: NURSE PRACTITIONER

## 2022-07-14 PROCEDURE — 3060F PR POS MICROALBUMINURIA RESULT DOCUMENTED/REVIEW: ICD-10-PCS | Mod: CPTII,S$GLB,, | Performed by: NURSE PRACTITIONER

## 2022-07-14 PROCEDURE — 4010F ACE/ARB THERAPY RXD/TAKEN: CPT | Mod: CPTII,S$GLB,, | Performed by: NURSE PRACTITIONER

## 2022-07-14 PROCEDURE — 3060F POS MICROALBUMINURIA REV: CPT | Mod: CPTII,S$GLB,, | Performed by: NURSE PRACTITIONER

## 2022-07-14 PROCEDURE — 3052F HG A1C>EQUAL 8.0%<EQUAL 9.0%: CPT | Mod: CPTII,S$GLB,, | Performed by: NURSE PRACTITIONER

## 2022-07-14 PROCEDURE — 1159F MED LIST DOCD IN RCRD: CPT | Mod: CPTII,S$GLB,, | Performed by: NURSE PRACTITIONER

## 2022-07-14 PROCEDURE — 1101F PT FALLS ASSESS-DOCD LE1/YR: CPT | Mod: CPTII,S$GLB,, | Performed by: NURSE PRACTITIONER

## 2022-07-14 PROCEDURE — 3078F PR MOST RECENT DIASTOLIC BLOOD PRESSURE < 80 MM HG: ICD-10-PCS | Mod: CPTII,S$GLB,, | Performed by: NURSE PRACTITIONER

## 2022-07-14 PROCEDURE — 3008F PR BODY MASS INDEX (BMI) DOCUMENTED: ICD-10-PCS | Mod: CPTII,S$GLB,, | Performed by: NURSE PRACTITIONER

## 2022-07-14 PROCEDURE — 99214 PR OFFICE/OUTPT VISIT, EST, LEVL IV, 30-39 MIN: ICD-10-PCS | Mod: S$GLB,,, | Performed by: NURSE PRACTITIONER

## 2022-07-14 PROCEDURE — 3078F DIAST BP <80 MM HG: CPT | Mod: CPTII,S$GLB,, | Performed by: NURSE PRACTITIONER

## 2022-07-14 PROCEDURE — 1101F PR PT FALLS ASSESS DOC 0-1 FALLS W/OUT INJ PAST YR: ICD-10-PCS | Mod: CPTII,S$GLB,, | Performed by: NURSE PRACTITIONER

## 2022-07-14 NOTE — PROGRESS NOTES
Cc :RUFINA    She has been setup with airsense 11 machine since seen. Using it qhs. Sometimes gets home late but puts mask on. Suffocating feeling in beginning but it gets better. Loves new Wisp mask. No chin strap and denies oral drying. Somenose moisture.   05/22/2022 - 06/22/2022  Usage days 30/32 days (94%)  >= 4 hours 22 days (69%)  < 4 hours 8 days (25%)  Usage hours 168 hours 9 minutes  Average usage (total days) 5 hours 15 minutes  Average usage (days used) 5 hours 36 minutes  Median usage (days used) 5 hours 54 minutes  Total used hours (value since last reset - 06/22/2022) 192 hours  AirSense 11 AutoSet  Serial number [id number]  Mode AutoSet  Min Pressure 5 cmH2O  Max Pressure 12 cmH2O  EPR Fulltime  EPR level 3  Response Standard  Therapy  Pressure - cmH2O Median: 5.9 95th percentile: 8.0 Maximum: 8.8  Leaks - L/min Median: 0.2 95th percentile: 2.2 Maximum: 16.4  Events per hour AI: 0.9 HI: 0.2 AHI: 1.  bp suboptimal today      Split night study 11/2013 showed AHI 23.7 that was reduced to 3.3 at 8 cm H20.   She was on CPAP for about a year.   She felt like her air pressure was not set correctly.   PSG 2/2021 AHI 12.3  PSG 9/2021 AHI 6.3 supine      BP (!) 152/66 (BP Location: Right arm, Patient Position: Sitting, BP Method: Medium (Automatic))   Pulse 76   Wt 133.7 kg (294 lb 11.2 oz)   BMI 49.04 kg/m²     Assessment  Mild rufina. SHE is adherent with APAP, benefiting from therapy. AHI<5. Meeting Medicare use guidelines  HTN,. CKD stage IV, DM2      Plan  apap 5-12cm continue, ramp 4cm/20min turned off. Reduced humidity 2-->1. Supplies via [facility] SDME  See PCP re: HTN/DM mgt/continue meds  rtc 12mos, sooner if needed

## 2022-08-01 ENCOUNTER — TELEPHONE (OUTPATIENT)
Dept: PHYSICAL MEDICINE AND REHAB | Facility: CLINIC | Age: 72
End: 2022-08-01
Payer: MEDICARE

## 2022-08-01 NOTE — TELEPHONE ENCOUNTER
Called and spoke with Mrs Mac  Patient was informed that her appointment with Dr Causey  tomorrow has been canceled due to unforeseen circumstances. Patient will be rescheduled and /or placed on the wait list.  Sorry for any  inconvenience.

## 2022-08-01 NOTE — TELEPHONE ENCOUNTER
----- Message from Nik Harris sent at 8/1/2022  3:55 PM CDT -----  Regarding: Returning a missed call  Contact: @[phone]  Pt requesting a call back regarding a missed call received in reference to appt on 08/2/22 that possibly needs to be scheduled.  Please call to discuss further.

## 2022-08-03 ENCOUNTER — PATIENT MESSAGE (OUTPATIENT)
Dept: BARIATRICS | Facility: CLINIC | Age: 72
End: 2022-08-03
Payer: MEDICARE

## 2022-08-15 ENCOUNTER — DOCUMENTATION ONLY (OUTPATIENT)
Dept: REHABILITATION | Facility: HOSPITAL | Age: 72
End: 2022-08-15
Payer: MEDICARE

## 2022-08-15 NOTE — PROGRESS NOTES
[facility]   Discharge Note    Name: Annika Mac  Clinic Number: [id number]    Therapy Diagnosis:        Encounter Diagnosis   Name Primary?    Decreased range of motion of lumbar spine Yes      Physician: Evangelist Hamilton FNP     Physician Orders: PT Eval and Treat   Medical Diagnosis from Referral:   M47.816 (ICD-10-CM) - Lumbar spondylosis   M47.816 (ICD-10-CM) - Lumbar facet arthropathy   M51.36 (ICD-10-CM) - DDD (degenerative disc disease), lumbar   Evaluation Date: 4/12/2022    Date of Last visit: 6/23/2022  Total Visits Received: 12    ASSESSMENT      Discharge reason: Patient has not attended therapy since 6/23/2022    Goals:   Short Term Goals:  4 weeks   1. Pain: Pt will demonstrate improved pain by reports of less than or equal to 5/10 worst pain on the verbal rating scale in order to progress toward maximal functional ability and improve QOL. MET 5/17/2022   2.  HEP: Patient will demonstrate independence with HEP in order to progress toward functional independence. Met 5/2/2022      Long Term Goals:  8 weeks   1. Function: Patient will demonstrate improved function as indicated by a functional limitation score of 37% on the FOTO. Not met    2. Mobility: Patient will present with full lumbar active range of motion in order to return to maximal functional potential and improve quality of life. Met 6/23/2022   3. Strength: Patient will improve strength to 5/5 in bilateral lower extremities in order to improve functional independence and quality of life. Met 6/23/2022   4. Patient will return to normal ADL's, community involvement, and recreational activities with no pain and maximal function. Not met             PLAN   This patient is discharged from Physical Therapy      Liz Robin, PT, DPT, PPCES  08/15/2022

## 2022-08-23 ENCOUNTER — OFFICE VISIT (OUTPATIENT)
Dept: PODIATRY | Facility: CLINIC | Age: 72
End: 2022-08-23
Payer: MEDICARE

## 2022-08-23 VITALS — BODY MASS INDEX: 48.82 KG/M2 | WEIGHT: 293 LBS | HEIGHT: 65 IN

## 2022-08-23 DIAGNOSIS — L60.9 DISEASE OF NAIL: ICD-10-CM

## 2022-08-23 DIAGNOSIS — E11.42 DIABETIC POLYNEUROPATHY ASSOCIATED WITH TYPE 2 DIABETES MELLITUS: Primary | ICD-10-CM

## 2022-08-23 PROCEDURE — 3051F PR MOST RECENT HEMOGLOBIN A1C LEVEL 7.0 - < 8.0%: ICD-10-PCS | Mod: CPTII,S$GLB,, | Performed by: PODIATRIST

## 2022-08-23 PROCEDURE — 99499 NO LOS: ICD-10-PCS | Mod: S$GLB,,, | Performed by: PODIATRIST

## 2022-08-23 PROCEDURE — 99999 PR PBB SHADOW E&M-EST. PATIENT-LVL III: ICD-10-PCS | Mod: PBBFAC,,, | Performed by: PODIATRIST

## 2022-08-23 PROCEDURE — 3051F HG A1C>EQUAL 7.0%<8.0%: CPT | Mod: CPTII,S$GLB,, | Performed by: PODIATRIST

## 2022-08-23 PROCEDURE — 1125F AMNT PAIN NOTED PAIN PRSNT: CPT | Mod: CPTII,S$GLB,, | Performed by: PODIATRIST

## 2022-08-23 PROCEDURE — 1159F MED LIST DOCD IN RCRD: CPT | Mod: CPTII,S$GLB,, | Performed by: PODIATRIST

## 2022-08-23 PROCEDURE — 3060F PR POS MICROALBUMINURIA RESULT DOCUMENTED/REVIEW: ICD-10-PCS | Mod: CPTII,S$GLB,, | Performed by: PODIATRIST

## 2022-08-23 PROCEDURE — 11721 PR DEBRIDEMENT OF NAILS, 6 OR MORE: ICD-10-PCS | Mod: Q9,S$GLB,, | Performed by: PODIATRIST

## 2022-08-23 PROCEDURE — 1125F PR PAIN SEVERITY QUANTIFIED, PAIN PRESENT: ICD-10-PCS | Mod: CPTII,S$GLB,, | Performed by: PODIATRIST

## 2022-08-23 PROCEDURE — 3060F POS MICROALBUMINURIA REV: CPT | Mod: CPTII,S$GLB,, | Performed by: PODIATRIST

## 2022-08-23 PROCEDURE — 3008F PR BODY MASS INDEX (BMI) DOCUMENTED: ICD-10-PCS | Mod: CPTII,S$GLB,, | Performed by: PODIATRIST

## 2022-08-23 PROCEDURE — 3066F NEPHROPATHY DOC TX: CPT | Mod: CPTII,S$GLB,, | Performed by: PODIATRIST

## 2022-08-23 PROCEDURE — 99499 UNLISTED E&M SERVICE: CPT | Mod: S$GLB,,, | Performed by: PODIATRIST

## 2022-08-23 PROCEDURE — 4010F PR ACE/ARB THEARPY RXD/TAKEN: ICD-10-PCS | Mod: CPTII,S$GLB,, | Performed by: PODIATRIST

## 2022-08-23 PROCEDURE — 1159F PR MEDICATION LIST DOCUMENTED IN MEDICAL RECORD: ICD-10-PCS | Mod: CPTII,S$GLB,, | Performed by: PODIATRIST

## 2022-08-23 PROCEDURE — 4010F ACE/ARB THERAPY RXD/TAKEN: CPT | Mod: CPTII,S$GLB,, | Performed by: PODIATRIST

## 2022-08-23 PROCEDURE — 99999 PR PBB SHADOW E&M-EST. PATIENT-LVL III: CPT | Mod: PBBFAC,,, | Performed by: PODIATRIST

## 2022-08-23 PROCEDURE — 3066F PR DOCUMENTATION OF TREATMENT FOR NEPHROPATHY: ICD-10-PCS | Mod: CPTII,S$GLB,, | Performed by: PODIATRIST

## 2022-08-23 PROCEDURE — 3008F BODY MASS INDEX DOCD: CPT | Mod: CPTII,S$GLB,, | Performed by: PODIATRIST

## 2022-08-23 PROCEDURE — 11721 DEBRIDE NAIL 6 OR MORE: CPT | Mod: Q9,S$GLB,, | Performed by: PODIATRIST

## 2022-08-24 ENCOUNTER — LAB VISIT (OUTPATIENT)
Dept: LAB | Facility: HOSPITAL | Age: 72
End: 2022-08-24
Attending: INTERNAL MEDICINE
Payer: MEDICARE

## 2022-08-24 DIAGNOSIS — E11.42 TYPE 2 DIABETES MELLITUS WITH DIABETIC POLYNEUROPATHY, WITH LONG-TERM CURRENT USE OF INSULIN: ICD-10-CM

## 2022-08-24 DIAGNOSIS — Z79.4 TYPE 2 DIABETES MELLITUS WITH DIABETIC POLYNEUROPATHY, WITH LONG-TERM CURRENT USE OF INSULIN: ICD-10-CM

## 2022-08-24 LAB
ALBUMIN SERPL BCP-MCNC: 3.7 G/DL (ref 3.5–5.2)
ALP SERPL-CCNC: 113 U/L (ref 55–135)
ALT SERPL W/O P-5'-P-CCNC: 18 U/L (ref 10–44)
ANION GAP SERPL CALC-SCNC: 10 MMOL/L (ref 8–16)
AST SERPL-CCNC: 25 U/L (ref 10–40)
BILIRUB SERPL-MCNC: 0.3 MG/DL (ref 0.1–1)
BUN SERPL-MCNC: 29 MG/DL (ref 8–23)
CALCIUM SERPL-MCNC: 9.4 MG/DL (ref 8.7–10.5)
CHLORIDE SERPL-SCNC: 108 MMOL/L (ref 95–110)
CO2 SERPL-SCNC: 24 MMOL/L (ref 23–29)
CREAT SERPL-MCNC: 1.6 MG/DL (ref 0.5–1.4)
EST. GFR  (NO RACE VARIABLE): 34.1 ML/MIN/1.73 M^2
ESTIMATED AVG GLUCOSE: 166 MG/DL (ref 68–131)
GLUCOSE SERPL-MCNC: 116 MG/DL (ref 70–110)
HBA1C MFR BLD: 7.4 % (ref 4–5.6)
POTASSIUM SERPL-SCNC: 4.3 MMOL/L (ref 3.5–5.1)
PROT SERPL-MCNC: 5.9 G/DL (ref 6–8.4)
SODIUM SERPL-SCNC: 142 MMOL/L (ref 136–145)

## 2022-08-24 PROCEDURE — 83036 HEMOGLOBIN GLYCOSYLATED A1C: CPT | Performed by: INTERNAL MEDICINE

## 2022-08-24 PROCEDURE — 36415 COLL VENOUS BLD VENIPUNCTURE: CPT | Mod: PO | Performed by: INTERNAL MEDICINE

## 2022-08-24 PROCEDURE — 80053 COMPREHEN METABOLIC PANEL: CPT | Performed by: INTERNAL MEDICINE

## 2022-08-25 ENCOUNTER — TELEPHONE (OUTPATIENT)
Dept: SLEEP MEDICINE | Facility: CLINIC | Age: 72
End: 2022-08-25
Payer: MEDICARE

## 2022-08-25 NOTE — TELEPHONE ENCOUNTER
----- Message from Kasey Thomas sent at 8/25/2022  2:02 PM CDT -----  Regarding: concerns  Name of Who is Calling: Annika           What is the request in detail: Patient is requesting a call back. She was told by the CPAP equipment office that she missed 1 day of the compliance and have to return her machine and schedule another sleep study.            Can the clinic reply by [facility]: No           What Number to Call Back if not in [facility]: [phone]

## 2022-08-25 NOTE — TELEPHONE ENCOUNTER
----- Message from Laquita Lafleur sent at 8/25/2022  2:21 PM CDT -----  Type:  Patient Returning Call    Who Called: self     Who Left Message for Patient: Rosa M Stone     Does the patient know what this is regarding?: yes     Would the patient rather a call back or a response via [facility]? Call back     Best Call Back Number: [phone]

## 2022-08-25 NOTE — TELEPHONE ENCOUNTER
KELLIM in regards to the message we received about not being in compliance for 1 day with her machine and being told by DME that she is having to return it.

## 2022-08-25 NOTE — TELEPHONE ENCOUNTER
Spoke to this patient in regards to the message we received. The patient stated that she was told by [facility] equipment that she was out of compliance for 1 day and now she is having to return her machine and get another sleep study done. I told her I will send a message over to [place] to see what is going on. The patient was seen 7/2022 for a follow up appointment.

## 2022-08-29 NOTE — PROGRESS NOTES
Subjective:      Patient ID: Annika Mac is a 71 y.o. female.    Chief Complaint: Diabetes Mellitus (pcp - Mamie Cotton MD - 3/14/22), Nail Care, and Foot Pain (L 2nd toe pain)    Annika is a 71 y.o. female who presents to the clinic for evaluation and treatment of high risk feet. Annika has a past medical history of Asthma, Chronic obstructive pulmonary disease, unspecified COPD type (1/14/2022), Constipation (10/3/2019), Deep vein thrombophlebitis of left leg (7/27/2012), Deep vein thrombosis, Encounter for blood transfusion, GERD (gastroesophageal reflux disease), Obesity, diabetes, and hypertension syndrome (2/13/2019), Obstructive sleep apnea (7/27/2012), PAD (peripheral artery disease) (11/21/2013), Pressure ulcer of toe of left foot, Type 2 diabetes mellitus with obesity (8/19/2020), and Type 2 diabetes mellitus with peripheral artery disease (8/19/2020). The patient's chief complaint is long, thick toenails. This patient has documented high risk feet requiring routine maintenance secondary to peripheral neuropathy.    PCP: Mamie Cotton MD    Date Last Seen by PCP:   Chief Complaint   Patient presents with    Diabetes Mellitus     pcp - Mamie Cotton MD - 3/14/22    Nail Care    Foot Pain     L 2nd toe pain         Current shoe gear:  Affected Foot: Slip-on shoes     Unaffected Foot: Slip-on shoes    Hemoglobin A1C   Date Value Ref Range Status   08/24/2022 7.4 (H) 4.0 - 5.6 % Final     Comment:     ADA Screening Guidelines:  5.7-6.4%  Consistent with prediabetes  >or=6.5%  Consistent with diabetes    High levels of fetal hemoglobin interfere with the HbA1C  assay. Heterozygous hemoglobin variants (HbS, HgC, etc)do  not significantly interfere with this assay.   However, presence of multiple variants may affect accuracy.     04/11/2022 8.1 (H) 4.0 - 5.6 % Final     Comment:     ADA Screening Guidelines:  5.7-6.4%  Consistent with prediabetes  >or=6.5%  Consistent with  diabetes    High levels of fetal hemoglobin interfere with the HbA1C  assay. Heterozygous hemoglobin variants (HbS, HgC, etc)do  not significantly interfere with this assay.   However, presence of multiple variants may affect accuracy.     01/07/2022 6.6 (H) 4.0 - 5.6 % Final     Comment:     ADA Screening Guidelines:  5.7-6.4%  Consistent with prediabetes  >or=6.5%  Consistent with diabetes    High levels of fetal hemoglobin interfere with the HbA1C  assay. Heterozygous hemoglobin variants (HbS, HgC, etc)do  not significantly interfere with this assay.   However, presence of multiple variants may affect accuracy.         Review of Systems   Constitutional: Negative for chills, fever and malaise/fatigue.   HENT:  Negative for hearing loss.    Cardiovascular:  Negative for claudication.   Respiratory:  Negative for shortness of breath.    Skin:  Positive for color change, dry skin, nail changes and unusual hair distribution. Negative for flushing and rash.   Musculoskeletal:  Negative for joint pain and myalgias.   Neurological:  Positive for numbness, paresthesias and sensory change. Negative for loss of balance.   Psychiatric/Behavioral:  Negative for altered mental status.          Objective:      Physical Exam  Vitals reviewed.   Constitutional:       Appearance: She is well-developed.   Cardiovascular:      Pulses:           Dorsalis pedis pulses are 0 on the right side and 0 on the left side.        Posterior tibial pulses are 1+ on the right side and 1+ on the left side.   Musculoskeletal:      Right ankle: Decreased range of motion. Abnormal pulse.      Right Achilles Tendon: Patel's test negative.      Left ankle: Decreased range of motion. Abnormal pulse.      Left Achilles Tendon: Patel's test negative.      Right foot: Decreased range of motion.      Left foot: Decreased range of motion.   Feet:      Right foot:      Protective Sensation: 5 sites tested.  2 sites sensed.      Left foot:       Protective Sensation: 5 sites tested.  2 sites sensed.   Skin:     General: Skin is dry.      Capillary Refill: Capillary refill takes more than 3 seconds.      Comments: Nails x10 are elongated by  5-6mm's, thickened by 2-3 mm's, dystrophic, and are yellowish in  coloration . Xerosis Bilaterally. No open lesions noted.    Neurological:      Mental Status: She is alert.      Comments: diminished sensation noted to b/L lower extremities   Psychiatric:         Behavior: Behavior normal. Behavior is cooperative.             Assessment:       Encounter Diagnoses   Name Primary?    Diabetic polyneuropathy associated with type 2 diabetes mellitus Yes    Disease of nail          Plan:       Annika was seen today for diabetes mellitus, nail care and foot pain.    Diagnoses and all orders for this visit:    Diabetic polyneuropathy associated with type 2 diabetes mellitus    Disease of nail    I counseled the patient on her conditions, their implications and medical management.        - Shoe inspection. Diabetic Foot Education. Patient reminded of the importance of good nutrition and blood sugar control to help prevent podiatric complications of diabetes. Patient instructed on proper foot hygeine. We discussed wearing proper shoe gear, daily foot inspections, never walking without protective shoe gear, never putting sharp instruments to feet, routine podiatric nail visits every 2-3 months.      - With patient's permission, nails were aggressively reduced and debrided x 10 to their soft tissue attachment mechanically and with electric , removing all offending nail and debris. Patient relates relief following the procedure. She will continue to monitor the areas daily, inspect her feet, wear protective shoe gear when ambulatory, moisturizer to maintain skin integrity and follow in this office in approximately 2-3 months, sooner p.r.n.

## 2022-08-31 ENCOUNTER — PES CALL (OUTPATIENT)
Dept: ADMINISTRATIVE | Facility: CLINIC | Age: 72
End: 2022-08-31
Payer: MEDICARE

## 2022-08-31 ENCOUNTER — OFFICE VISIT (OUTPATIENT)
Dept: ENDOCRINOLOGY | Facility: CLINIC | Age: 72
End: 2022-08-31
Payer: MEDICARE

## 2022-08-31 ENCOUNTER — CLINICAL SUPPORT (OUTPATIENT)
Dept: DIABETES | Facility: CLINIC | Age: 72
End: 2022-08-31
Payer: MEDICARE

## 2022-08-31 VITALS
RESPIRATION RATE: 18 BRPM | HEART RATE: 71 BPM | OXYGEN SATURATION: 97 % | BODY MASS INDEX: 48.82 KG/M2 | DIASTOLIC BLOOD PRESSURE: 58 MMHG | WEIGHT: 293 LBS | HEIGHT: 65 IN | SYSTOLIC BLOOD PRESSURE: 112 MMHG

## 2022-08-31 DIAGNOSIS — E11.22 TYPE 2 DM WITH CKD STAGE 4 AND HYPERTENSION: Chronic | ICD-10-CM

## 2022-08-31 DIAGNOSIS — I12.9 TYPE 2 DM WITH CKD STAGE 4 AND HYPERTENSION: Chronic | ICD-10-CM

## 2022-08-31 DIAGNOSIS — Z79.4 TYPE 2 DIABETES MELLITUS WITH DIABETIC POLYNEUROPATHY, WITH LONG-TERM CURRENT USE OF INSULIN: Primary | ICD-10-CM

## 2022-08-31 DIAGNOSIS — N06.9 ISOLATED PROTEINURIA WITH MORPHOLOGIC LESION: ICD-10-CM

## 2022-08-31 DIAGNOSIS — N18.32 ANEMIA IN STAGE 3B CHRONIC KIDNEY DISEASE: ICD-10-CM

## 2022-08-31 DIAGNOSIS — D63.1 ANEMIA IN STAGE 3B CHRONIC KIDNEY DISEASE: ICD-10-CM

## 2022-08-31 DIAGNOSIS — E11.9 TYPE 2 DIABETES MELLITUS WITHOUT COMPLICATION, WITH LONG TERM CURRENT USE OF INSULIN PUMP: Primary | ICD-10-CM

## 2022-08-31 DIAGNOSIS — Z96.41 TYPE 2 DIABETES MELLITUS WITHOUT COMPLICATION, WITH LONG TERM CURRENT USE OF INSULIN PUMP: Primary | ICD-10-CM

## 2022-08-31 DIAGNOSIS — E11.42 TYPE 2 DIABETES MELLITUS WITH DIABETIC POLYNEUROPATHY, WITH LONG-TERM CURRENT USE OF INSULIN: Primary | ICD-10-CM

## 2022-08-31 DIAGNOSIS — N18.4 TYPE 2 DM WITH CKD STAGE 4 AND HYPERTENSION: Chronic | ICD-10-CM

## 2022-08-31 PROBLEM — G89.29 CHRONIC PAIN: Status: RESOLVED | Noted: 2021-05-06 | Resolved: 2022-08-31

## 2022-08-31 PROBLEM — M25.642 DECREASED RANGE OF MOTION OF LEFT THUMB: Status: RESOLVED | Noted: 2020-07-09 | Resolved: 2022-08-31

## 2022-08-31 PROBLEM — D64.9 ANEMIA: Status: RESOLVED | Noted: 2021-12-22 | Resolved: 2022-08-31

## 2022-08-31 PROBLEM — B37.2 CANDIDIASIS, CUTANEOUS: Status: RESOLVED | Noted: 2017-05-22 | Resolved: 2022-08-31

## 2022-08-31 PROCEDURE — 3008F PR BODY MASS INDEX (BMI) DOCUMENTED: ICD-10-PCS | Mod: CPTII,S$GLB,, | Performed by: INTERNAL MEDICINE

## 2022-08-31 PROCEDURE — 1159F PR MEDICATION LIST DOCUMENTED IN MEDICAL RECORD: ICD-10-PCS | Mod: CPTII,S$GLB,, | Performed by: INTERNAL MEDICINE

## 2022-08-31 PROCEDURE — 4010F ACE/ARB THERAPY RXD/TAKEN: CPT | Mod: CPTII,S$GLB,, | Performed by: INTERNAL MEDICINE

## 2022-08-31 PROCEDURE — 99214 PR OFFICE/OUTPT VISIT, EST, LEVL IV, 30-39 MIN: ICD-10-PCS | Mod: S$GLB,,, | Performed by: INTERNAL MEDICINE

## 2022-08-31 PROCEDURE — 3008F BODY MASS INDEX DOCD: CPT | Mod: CPTII,S$GLB,, | Performed by: INTERNAL MEDICINE

## 2022-08-31 PROCEDURE — 99999 PR PBB SHADOW E&M-EST. PATIENT-LVL V: CPT | Mod: PBBFAC,,, | Performed by: INTERNAL MEDICINE

## 2022-08-31 PROCEDURE — 1159F MED LIST DOCD IN RCRD: CPT | Mod: CPTII,S$GLB,, | Performed by: INTERNAL MEDICINE

## 2022-08-31 PROCEDURE — 3288F PR FALLS RISK ASSESSMENT DOCUMENTED: ICD-10-PCS | Mod: CPTII,S$GLB,, | Performed by: INTERNAL MEDICINE

## 2022-08-31 PROCEDURE — 3051F PR MOST RECENT HEMOGLOBIN A1C LEVEL 7.0 - < 8.0%: ICD-10-PCS | Mod: CPTII,S$GLB,, | Performed by: INTERNAL MEDICINE

## 2022-08-31 PROCEDURE — 3066F PR DOCUMENTATION OF TREATMENT FOR NEPHROPATHY: ICD-10-PCS | Mod: CPTII,S$GLB,, | Performed by: INTERNAL MEDICINE

## 2022-08-31 PROCEDURE — 3078F PR MOST RECENT DIASTOLIC BLOOD PRESSURE < 80 MM HG: ICD-10-PCS | Mod: CPTII,S$GLB,, | Performed by: INTERNAL MEDICINE

## 2022-08-31 PROCEDURE — 3078F DIAST BP <80 MM HG: CPT | Mod: CPTII,S$GLB,, | Performed by: INTERNAL MEDICINE

## 2022-08-31 PROCEDURE — 3060F PR POS MICROALBUMINURIA RESULT DOCUMENTED/REVIEW: ICD-10-PCS | Mod: CPTII,S$GLB,, | Performed by: INTERNAL MEDICINE

## 2022-08-31 PROCEDURE — 3288F FALL RISK ASSESSMENT DOCD: CPT | Mod: CPTII,S$GLB,, | Performed by: INTERNAL MEDICINE

## 2022-08-31 PROCEDURE — 99214 OFFICE O/P EST MOD 30 MIN: CPT | Mod: S$GLB,,, | Performed by: INTERNAL MEDICINE

## 2022-08-31 PROCEDURE — 3051F HG A1C>EQUAL 7.0%<8.0%: CPT | Mod: CPTII,S$GLB,, | Performed by: INTERNAL MEDICINE

## 2022-08-31 PROCEDURE — 1126F PR PAIN SEVERITY QUANTIFIED, NO PAIN PRESENT: ICD-10-PCS | Mod: CPTII,S$GLB,, | Performed by: INTERNAL MEDICINE

## 2022-08-31 PROCEDURE — 3074F PR MOST RECENT SYSTOLIC BLOOD PRESSURE < 130 MM HG: ICD-10-PCS | Mod: CPTII,S$GLB,, | Performed by: INTERNAL MEDICINE

## 2022-08-31 PROCEDURE — 1126F AMNT PAIN NOTED NONE PRSNT: CPT | Mod: CPTII,S$GLB,, | Performed by: INTERNAL MEDICINE

## 2022-08-31 PROCEDURE — 1100F PR PT FALLS ASSESS DOC 2+ FALLS/FALL W/INJURY/YR: ICD-10-PCS | Mod: CPTII,S$GLB,, | Performed by: INTERNAL MEDICINE

## 2022-08-31 PROCEDURE — 1100F PTFALLS ASSESS-DOCD GE2>/YR: CPT | Mod: CPTII,S$GLB,, | Performed by: INTERNAL MEDICINE

## 2022-08-31 PROCEDURE — 99999 PR PBB SHADOW E&M-EST. PATIENT-LVL V: ICD-10-PCS | Mod: PBBFAC,,, | Performed by: INTERNAL MEDICINE

## 2022-08-31 PROCEDURE — 3060F POS MICROALBUMINURIA REV: CPT | Mod: CPTII,S$GLB,, | Performed by: INTERNAL MEDICINE

## 2022-08-31 PROCEDURE — 3074F SYST BP LT 130 MM HG: CPT | Mod: CPTII,S$GLB,, | Performed by: INTERNAL MEDICINE

## 2022-08-31 PROCEDURE — 4010F PR ACE/ARB THEARPY RXD/TAKEN: ICD-10-PCS | Mod: CPTII,S$GLB,, | Performed by: INTERNAL MEDICINE

## 2022-08-31 PROCEDURE — 3066F NEPHROPATHY DOC TX: CPT | Mod: CPTII,S$GLB,, | Performed by: INTERNAL MEDICINE

## 2022-08-31 RX ORDER — SEMAGLUTIDE 2.68 MG/ML
2 INJECTION, SOLUTION SUBCUTANEOUS
Qty: 9 ML | Refills: 3 | Status: SHIPPED | OUTPATIENT
Start: 2022-08-31 | End: 2022-09-12 | Stop reason: CLARIF

## 2022-08-31 RX ORDER — INSULIN DEGLUDEC 100 U/ML
40 INJECTION, SOLUTION SUBCUTANEOUS DAILY
Qty: 15 PEN | Refills: 3
Start: 2022-08-31 | End: 2023-04-14 | Stop reason: SDUPTHER

## 2022-08-31 NOTE — ASSESSMENT & PLAN NOTE
Reviewed goals of therapy are to get the best control we can without hypoglycemia.  Options for therapy are limited by chronic kidney disease and previous hypoglycemia.         Medication changes:   · Continue Tresiba 40 units daily  · Continue Humalog 10/11/11 units t.i.d. with meals  · Added moderate dose correction scale  · Increase Ozempic 1 mg > 2 mg weekly  · If she has trouble getting the medication due to supply issues, I asked her to contact me so I could adjust her insulin doses.     Considered adding SGLT-2i. We decided to hold off today and discuss it again at our next visit since we are increasing her Ozempic dose.    Reviewed patient's current insulin regimen. Clarified proper insulin dose and timing in relation to meals, etc. Insulin injection sites and proper rotation instructed.      Advised frequent self blood glucose monitoring. Patient encouraged to document glucose results and bring them to every clinic visit.    Hypoglycemia precautions discussed. Instructed on precautions before driving.      Close adherence to lifestyle changes recommended.      Health maintenance topics addressed above.        Lab Results   Component Value Date    HGBA1C 7.4 (H) 08/24/2022    HGBA1C 8.1 (H) 04/11/2022    HGBA1C 6.6 (H) 01/07/2022

## 2022-08-31 NOTE — PROGRESS NOTES
Diabetes Care Specialist Progress Note  Author: Marion Avitia RD  Date: 8/31/2022    Program Intake  Reason for Diabetes Program Visit:: Intervention  Type of Intervention:: Individual  Individual: Education  Education: Nutrition and Meal Planning  Current diabetes risk level:: moderate  In the last 12 months, have you:: none    Lab Results   Component Value Date    HGBA1C 7.4 (H) 08/24/2022       Clinical  Problem Review  Reviewed Problem List with Patient: yes  Active comorbidities affecting diabetes self-care.: yes  Comorbidities: Hypertension, Chronic Kidney Disease, Other (comment) (Difficulty with ambulation)  Reviewed health maintenance: yes    Clinical Assessment  Have you ever experienced hypoglycemia (low blood sugar)?: yes  In the last month, how often have you experienced low blood sugar?: once a day  What symptoms do you experience?: Dizzy/Light-headed, Shaky  Have you ever been hospitalized because your blood sugar was too low?: yes (see comments) (Was treated at hospital in April for low bg)  Have you ever experienced hyperglycemia (high blood sugar)?: yes  In the last month, how often have you experienced high blood sugar?: more than once a day    Nutritional Status  Meal Plan 24 Hour Recall: Breakfast, Lunch, Snack  Meal Plan 24 Hour Recall - Breakfast: yesterday; waffles, eggs, turkey phan  Meal Plan 24 Hour Recall - Lunch: cabbage and corn bread  Meal Plan 24 Hour Recall - Snack: juice, peanut butter crackers  Recent Changes in Weight: No Recent Weight Change  Current nutritional status an area of need that is impacting patient's ability to self-manage diabetes?: Yes    Additional Social History    Support  Does anyone support you with your diabetes care?: yes  Who supports you?: spouse  Who takes you to your medical appointments?: spouse  Does the current support meet the patient's needs?: Yes  Is Support an area impacting ability to self-manage diabetes?: No    Access to [employer]  [employer]  Does the patient have access to any of the following devices or technologies?: Smart phone (Not compatible with Dexcom nicholas)    Health Literacy  Preferred Learning Method: Face to Face      Diabetes Self-Management Skills Assessment    Diabetes Disease Process/Treatment Options  Patient/caregiver able to state what happens when someone has diabetes.: yes  Patient/caregiver knows what type of diabetes they have.: yes  Diabetes Type : Type II  Assessment indicates:: Adequate understanding  Area of need?: No    Nutrition/Healthy Eating  Challenges to healthy eating:: portion control  Method of carbohydrate measurement:: no method  Patient can identify foods that impact blood sugar.: yes  Patient-identified foods:: sweets  Assessment indicates:: Knowledge deficit, Instruction Needed  Area of need?: Yes    Physical Activity/Exercise  Patient's daily activity level:: sedentary  Patient formally exercises outside of work.: no  Reasons for not exercising:: physically unable to exercise currently  Patient can identify forms of physical activity.: no  Assessment indicates:: Knowledge deficit  Area of need?: Yes    Medications  Patient is able to describe current diabetes management routine.: yes  Diabetes management routine:: insulin  Patient is able to identify current diabetes medications, dosages, and appropriate timing of medications.: yes  Patient understands the purpose of the medications taken for diabetes.: yes  Patient reports problems or concerns with current medication regimen.: yes  Medication regimen problems/concerns:: concerned about side effects  Assessment indicates:: Adequate understanding  Area of need?: No    Home Blood Glucose Monitoring  Patient states that blood sugar is checked at home daily.: yes  Monitoring Method:: home glucometer  Home glucometer meter type:: Insurance preferred meter  Blood glucose logs:: yes, encouraged to keep logs, encouraged to bring logs to provider visits  Blood  glucose logs reviewed today?: yes  Assessment indicates:: Adequate understanding  Area of need?: No    Acute Complications  Able to state the blood sugar range for hypoglycemia?: yes  Patient can identify general symptoms of hypoglycemia: yes  Patient identified:: shakiness  Able to state proper treatment of hypoglycemia?: yes  Patient identified:: 5-6 pieces of hard candy, 1/2 can soda/fruit juice  Assessment indicates:: Adequate understanding  Area of need?: No    Chronic Complications  Patient can identify major chronic complications of diabetes.: yes  Stated chronic complications:: kidney disease  Patient can identify ways to prevent or delay diabetes complications.: yes  Stated ways to prevent complications:: controlling blood sugar  Assessment indicates:: Knowledge deficit  Area of need?: Yes    Psychosocial/Coping  Patient can identify ways of coping with chronic disease.: yes  Patient-stated ways of coping with chronic disease:: support from loved ones  Assessment indicates:: Adequate understanding  Area of need?: No      Diabetes Self Support Plan         Assessment Summary and Plan    Based on today's diabetes care assessment, the following areas of need were identified:      Social 8/31/2022   Support No   Access to [employer] Media/Tech -   Cognitive/Behavioral Health -   Health Literacy -        Clinical 8/31/2022   Medication Adherence -   Nutritional Status Yes, see care plan        Diabetes Self-Management Skills 8/31/2022   Diabetes Disease Process/Treatment Options No   Nutrition/Healthy Eating Yes, see care plan   Physical Activity/Exercise No   Medication No   Home Blood Glucose Monitoring No   Acute Complications No   Chronic Complications No   Psychosocial/Coping No          Today's interventions were provided through individual discussion, instruction, and written materials were provided.      Patient verbalized understanding of instruction and written materials.  Pt was able to return back  demonstration of instructions today. Patient understood key points, needs reinforcement and further instruction.     Diabetes Self-Management Care Plan:    Today's Diabetes Self-Management Care Plan was developed with Annika's input. Annika has agreed to work toward the following goal(s) to improve his/her overall diabetes control.      Care Plan: Diabetes Management   Updates made since 8/1/2022 12:00 AM        Problem: Healthy Eating         Goal: Eat 2-3 meals daily with 30-45g/2-3 servings of Carbohydrate per meal. Limit snacking in between meal to 1 serving (15 grams).    Start Date: 8/31/2022   Expected End Date: 12/8/2022   This Visit's Progress: Not met   Recent Progress: On track   Priority: Medium   Barriers: Lack of Motivation to Change   Note:    Reviewed meal planning and general nutritional counseling with regards to diabetes management. Meal habits reviewed. We concentrated on portion sizes and carb limits for each meal.  This has been a challenge for Annika in the past.  Stress the importance of making better meal choices and to measure all foods.   Encouraged increased consumption of non starchy veggies to diet.  Overall planning meals and making better choices.  Patient is motivated to make changes.     Update to care planning 4/18/2022: States eating schedule is off since she cares for grandchildren 3-5 x per week and tends to eat what is available. Suggested bringing fruit and salad with her when she is out of the house and to try to avoid snack type foods. States wants to do better and is willing to try.     Update to care planning 5/9/2022:  Reviewed changes made since last visit.  States she feels she is more on schedule with her meals now.  Usually doing a shake for breakfast.  Encouraged to measure all foods especially carbs. States she is eating more salads.     Update to care planning 8/31/22: Pt stated she needed help with carb counting. We reviewed the carb list, carb servings and grams of  carbs. I also showed her the plate method and she seemed interested in using that to monitor her carb intake.          Follow Up Plan     Follow up in about 3 months (around 11/30/2022). Assess changes in eating pattern, physical activity (pt stated she was joining a gym through her insurance).    Today's care plan and follow up schedule was discussed with patient.  Annika verbalized understanding of the care plan, goals, and agrees to follow up plan.        The patient was encouraged to communicate with his/her health care provider/physician and care team regarding his/her condition(s) and treatment.  I provided the patient with my contact information today and encouraged to contact me via phone or [facility] Patient Portal as needed.     Length of Visit   Total Time: 60 Minutes

## 2022-08-31 NOTE — PROGRESS NOTES
FOLLOW-UP VISIT    Subjective:      Chief Complaint: Follow-up for diabetes and obesity    HPI: Annika Mac is a 71 y.o. female who is here for a follow-up evaluation for type 2 diabetes    The patient's last visit with me was on 4/18/2022.    Past Medical History:   Diagnosis Date    Asthma     Chronic obstructive pulmonary disease, unspecified COPD type 1/14/2022    Constipation 10/3/2019    Deep vein thrombophlebitis of left leg 7/27/2012    Deep vein thrombosis     when she had a knee replacement    Diabetic foot ulcer with osteomyelitis     Encounter for blood transfusion     anemic     GERD (gastroesophageal reflux disease)     Obesity, diabetes, and hypertension syndrome 2/13/2019    Obstructive sleep apnea 7/27/2012    PAD (peripheral artery disease) 11/21/2013    Pressure ulcer of toe of left foot     Type 2 diabetes mellitus with obesity 8/19/2020    Type 2 diabetes mellitus with peripheral artery disease 8/19/2020         With regards to the diabetes, obesity:    Last visit, we discontinued the 70/30 insulin and switched to Tresiba/Humalog.  She has been monitoring her blood sugars 3 times daily.  Blood sugars in the morning running in the high 100/low 200s.  Lunch and dinner blood sugar checks in the mid to high 100s.  Otherwise, she denies any complaints.  Her weight is down 3 lb since our last visit.    Diabetes history:  Diagnosed: 1987.   Started oral agents then NPH and Regular insulin.   Converted to MDI with Lantus and Novolog in 2/2006 after knee surgery.   D/c TZD due to weight gain 7/08 and added Symlin. Stopped Symlin 2/09.   Januvia added 2/10. D/C Januvia 12/10 r/t cost; resumed though pt assistance in 2016.   Converted to U500 in 7/10. Now using Humalog 70/30 due to financial constraints.  However, she recently switched insurances to people self and is currently not paying anything for her insulin or Ozempic.      Known complications:  Peripheral neuropathy  Nephropathy  Toe ulcer with  osteomyelitis  Hypoglycemia       Current regimen:  Tresiba 40 units daily  Humalog 10/11/11 units TID with meals  Ozempic 1 mg once weekly      Other agents tried:  NPH/R  U-500 insulin  Glargine, detemir, aspart  Januvia  Roe  Actos    Hypoglycemia:  See above  Knows how to correct with 15 grams of carbs- juice, coke, or glucose tablets.     Denies missed doses.  Has Medicare Extra Help     Eats 3 meals daily, + snacks  Breakfast:  Grits/eggs/sausage georgie  Lunch:  Mashed potatoes, baked fish, corn, salad  Dinner:  Half cup rice, beans, baked chicken and greens.  Drinks:  Water, unsweet tea; Gatorade zero.    No formal exercise. Doing some exercises while seated.     Education - last visit:  5/4/2021    She is monitoring her blood glucose by fingerstick 3 times daily.    BG logs over the past several months were reviewed and scanned into Media  Still above goal fasting and pre-prandial, although rarely above 200.    Diabetes Management Status    Statin: Taking  ACE/ARB: Taking    Screening or Prevention Patient's value Goal Complete/Controlled?   HgA1C Testing and Control   Lab Results   Component Value Date    HGBA1C 7.4 (H) 08/24/2022      Annually/Less than 8% No   Lipid profile : 01/07/2022 Annually Yes   LDL control Lab Results   Component Value Date    LDLCALC 80.0 01/07/2022    Annually/Less than 100 mg/dl  Yes   Nephropathy screening Lab Results   Component Value Date    LABMICR 123.0 04/11/2022     Lab Results   Component Value Date    PROTEINUA Negative 11/13/2019     Lab Results   Component Value Date    UTPCR Unable to calculate 11/13/2019      Annually Yes   Blood pressure BP Readings from Last 1 Encounters:   08/31/22 (!) 112/58    Less than 140/90 Yes   Dilated retinal exam : 06/15/2022 - Dr. Crook Annually Yes   Foot exam   : 08/23/2022 Annually Yes     Lab Results   Component Value Date    MICALBCREAT 76.9 (H) 04/11/2022    MICALBCREAT 270.1 (H) 01/07/2022    MICALBCREAT 8.7 01/04/2021     MICALBCREAT 11.9 05/01/2019    MICALBCREAT 15.9 10/13/2017         Blood pressure at home running good for the most part. 120-130/60-70s on her most recent log.      Reviewed past medical, family, social history and updated as appropriate.        Objective:     Vitals:    08/31/22 1428   BP: (!) 112/58   Pulse: 71   Resp: 18     BP Readings from Last 5 Encounters:   08/31/22 (!) 112/58   07/14/22 (!) 152/66   06/17/22 (!) 116/57   06/13/22 130/62   06/07/22 129/71       Physical Exam  Vitals and nursing note reviewed.   Constitutional:       General: She is not in acute distress.     Appearance: She is well-developed. She is obese.   HENT:      Head: Normocephalic and atraumatic.   Eyes:      General:         Right eye: No discharge.         Left eye: No discharge.      Conjunctiva/sclera: Conjunctivae normal.   Neck:      Thyroid: No thyromegaly.      Trachea: No tracheal deviation.   Cardiovascular:      Rate and Rhythm: Normal rate.   Pulmonary:      Effort: Pulmonary effort is normal. No respiratory distress.   Musculoskeletal:      Comments: No digital clubbing or extremity cyanosis   Neurological:      Mental Status: She is alert and oriented to person, place, and time.      Coordination: Coordination normal.   Psychiatric:         Behavior: Behavior normal.       Wt Readings from Last 50 Encounters:   08/31/22 133.8 kg (295 lb 1.4 oz)   08/23/22 133.7 kg (294 lb 12.1 oz)   07/14/22 133.7 kg (294 lb 11.2 oz)   06/17/22 134.5 kg (296 lb 8.3 oz)   06/13/22 134 kg (295 lb 6.7 oz)   05/19/22 127 kg (280 lb)   05/16/22 127 kg (279 lb 15.8 oz)   05/12/22 127 kg (280 lb)   05/09/22 131 kg (288 lb 11.1 oz)   04/19/22 132.5 kg (292 lb)   04/18/22 132.7 kg (292 lb 8.8 oz)   04/18/22 132.7 kg (292 lb 10.6 oz)   04/11/22 132 kg (291 lb 0.1 oz)   04/05/22 132 kg (291 lb)   04/01/22 133.1 kg (293 lb 6.2 oz)   03/28/22 132.4 kg (291 lb 14.2 oz)   03/25/22 132.4 kg (291 lb 14.2 oz)   03/22/22 133.9 kg (295 lb 3.1 oz)    03/14/22 132 kg (291 lb)   03/14/22 132.3 kg (291 lb 10.7 oz)   03/14/22 132.3 kg (291 lb 10.7 oz)   03/04/22 131.3 kg (289 lb 7.4 oz)   02/17/22 133.2 kg (293 lb 10.4 oz)   01/21/22 133.8 kg (295 lb)   01/21/22 134.2 kg (295 lb 15.5 oz)   01/14/22 (!) 136.3 kg (300 lb 7.8 oz)   01/03/22 135.6 kg (299 lb)   12/22/21 136 kg (299 lb 13.2 oz)   12/22/21 (!) 136.9 kg (301 lb 13 oz)   12/18/21 (!) 138.3 kg (305 lb)   12/13/21 (!) 138.3 kg (305 lb)   12/08/21 (!) 138.4 kg (305 lb 1.9 oz)   12/03/21 (!) 138.4 kg (305 lb 1.9 oz)   11/23/21 133.8 kg (294 lb 15.6 oz)   11/15/21 133.8 kg (295 lb)   11/03/21 133.8 kg (295 lb)   10/28/21 (!) 137.9 kg (304 lb 0.2 oz)   10/25/21 (!) 137.9 kg (304 lb)   10/13/21 (!) 138.3 kg (304 lb 14.3 oz)   09/27/21 (!) 138.3 kg (305 lb)   09/15/21 (!) 137.2 kg (302 lb 7.5 oz)   09/13/21 (!) 137.4 kg (302 lb 14.6 oz)   09/13/21 (!) 136.6 kg (301 lb 2.4 oz)   08/13/21 (!) 142.2 kg (313 lb 7.9 oz)   08/05/21 (!) 142.2 kg (313 lb 7.9 oz)   07/08/21 (!) 142.2 kg (313 lb 7.9 oz)   06/23/21 (!) 142.2 kg (313 lb 7.9 oz)   06/02/21 (!) 140.9 kg (310 lb 9.6 oz)   05/21/21 (!) 142.9 kg (315 lb 0.6 oz)   05/17/21 (!) 142.9 kg (315 lb 0.6 oz)       Lab Results   Component Value Date    HGBA1C 7.4 (H) 08/24/2022     Lab Results   Component Value Date    CHOL 138 01/07/2022    HDL 38 (L) 01/07/2022    LDLCALC 80.0 01/07/2022    TRIG 100 01/07/2022    CHOLHDL 27.5 01/07/2022     Lab Results   Component Value Date     08/24/2022    K 4.3 08/24/2022     08/24/2022    CO2 24 08/24/2022     (H) 08/24/2022    BUN 29 (H) 08/24/2022    CREATININE 1.6 (H) 08/24/2022    CALCIUM 9.4 08/24/2022    PROT 5.9 (L) 08/24/2022    ALBUMIN 3.7 08/24/2022    BILITOT 0.3 08/24/2022    ALKPHOS 113 08/24/2022    AST 25 08/24/2022    ALT 18 08/24/2022    ANIONGAP 10 08/24/2022    ESTGFRAFRICA 29.9 (A) 06/13/2022    EGFRNONAA 26.0 (A) 06/13/2022    TSH 1.365 09/08/2020      Lab Results   Component Value Date     MICALBCREAT 76.9 (H) 04/11/2022       Assessment/Plan:     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 Crook    Type 2 DM with CKD stage 4 and hypertension  Reviewed goals of therapy are to get the best control we can without hypoglycemia.  Options for therapy are limited by chronic kidney disease and previous hypoglycemia.         Medication changes:   Continue Tresiba 40 units daily  Continue Humalog 10/11/11 units t.i.d. with meals  Added moderate dose correction scale  Increase Ozempic 1 mg > 2 mg weekly  If she has trouble getting the medication due to supply issues, I asked her to contact me so I could adjust her insulin doses.     Considered adding SGLT-2i. We decided to hold off today and discuss it again at our next visit since we are increasing her Ozempic dose.    Reviewed patient's current insulin regimen. Clarified proper insulin dose and timing in relation to meals, etc. Insulin injection sites and proper rotation instructed.      Advised frequent self blood glucose monitoring. Patient encouraged to document glucose results and bring them to every clinic visit.    Hypoglycemia precautions discussed. Instructed on precautions before driving.      Close adherence to lifestyle changes recommended.      Health maintenance topics addressed above.        Lab Results   Component Value Date    HGBA1C 7.4 (H) 08/24/2022    HGBA1C 8.1 (H) 04/11/2022    HGBA1C 6.6 (H) 01/07/2022         Anemia in chronic kidney disease (CKD)  This may artificially lower her A1c.    Isolated proteinuria  Urine protein had previously been normal.  Repeat urine protein was still elevated, although significantly improved.  We will continue to monitor for now. Recheck with next lab in 4 months.    RTC in 4 months with lab work.

## 2022-08-31 NOTE — ASSESSMENT & PLAN NOTE
Urine protein had previously been normal.  Repeat urine protein was still elevated, although significantly improved.  We will continue to monitor for now. Recheck with next lab in 4 months.

## 2022-09-01 PROBLEM — E11.3513 TYPE 2 DIABETES MELLITUS WITH BOTH EYES AFFECTED BY PROLIFERATIVE RETINOPATHY AND MACULAR EDEMA, WITH LONG-TERM CURRENT USE OF INSULIN: Status: ACTIVE | Noted: 2017-10-24

## 2022-09-01 PROBLEM — Z79.4 TYPE 2 DIABETES MELLITUS WITH BOTH EYES AFFECTED BY PROLIFERATIVE RETINOPATHY AND MACULAR EDEMA, WITH LONG-TERM CURRENT USE OF INSULIN: Status: ACTIVE | Noted: 2017-10-24

## 2022-09-01 PROBLEM — G63 POLYNEUROPATHY ASSOCIATED WITH UNDERLYING DISEASE: Status: RESOLVED | Noted: 2022-01-14 | Resolved: 2022-09-01

## 2022-09-01 RX ORDER — INSULIN HUMAN 100 [IU]/ML
INJECTION, SUSPENSION SUBCUTANEOUS
COMMUNITY
Start: 2022-04-23 | End: 2022-09-02

## 2022-09-01 NOTE — PROGRESS NOTES
"  Annika Mac presented for a  Medicare AWV and comprehensive Health Risk Assessment today. The following components were reviewed and updated:    Medical history  Family History  Social history  Allergies and Current Medications  Health Risk Assessment  Health Maintenance  Care Team         ** See Completed Assessments for Annual Wellness Visit within the encounter summary.**         The following assessments were completed:  Living Situation  CAGE  Depression Screening  Timed Get Up and Go  Whisper Test  Cognitive Function Screening  Nutrition Screening  ADL Screening  PAQ Screening    Review for Opioid Screening: Pt does not have Rx for Opioids      Review for Substance Use Disorders: Patient does not use substance        Current Outpatient Medications:     albuterol (PROAIR HFA) 90 mcg/actuation inhaler, Inhale 2 puffs into the lungs every 6 (six) hours as needed for Wheezing. Rescue, Disp: 18 g, Rfl: 6    amLODIPine (NORVASC) 10 MG tablet, Take 1 tablet (10 mg total) by mouth once daily., Disp: 90 tablet, Rfl: 3    ammonium lactate 12 % Crea, Apply twice daily to affected parts both feet as needed., Disp: 140 g, Rfl: 11    aspirin 81 MG Chew, Take 1 tablet (81 mg total) by mouth once daily., Disp: , Rfl: 0    BD ULTRA-FINE KIKA PEN NEEDLE 32 gauge x 5/32" Ndle, To use 4 times daily, Disp: 200 each, Rfl: 20    blood sugar diagnostic Strp, 1 strip 4 times daily. Contour Next., Disp: 200 strip, Rfl: 11    blood-glucose meter kit, 1 each by Other route once daily. Use daily. Insurance proffered one touch  E11.42, Disp: 1 each, Rfl: 0    chlorthalidone (HYGROTEN) 25 MG Tab, Take 1 tablet (25 mg total) by mouth once daily., Disp: 30 tablet, Rfl: 11    colchicine (COLCRYS) 0.6 mg tablet, TAKE 1 TABLET EVERY OTHER DAY, Disp: 45 tablet, Rfl: 3    DULoxetine (CYMBALTA) 30 MG capsule, TAKE 1 CAPSULE BY MOUTH  ONCE DAILY, Disp: 90 capsule, Rfl: 3    ferrous sulfate (FEOSOL) Tab tablet, Take 1 tablet (1 each total) by mouth " once daily., Disp: 90 tablet, Rfl: 6    glucagon (GVOKE HYPOPEN 1-PACK) 0.5 mg/0.1 mL AtIn, Inject 1 Dose into the skin daily as needed (for severe hypoglycemia if you aren't able to treat by ingesting sugar)., Disp: 1 Syringe, Rfl: 3    HUMALOG KWIKPEN INSULIN 100 unit/mL pen, INJECT SUBCUTANEOUSLY 11  UNITS INTO THE SKIN 3 TIMES DAILY BEFORE MEALS PLUS  SLIDING SCALE - MAX TOTAL  DAILY DOSE 63 UNITS, Disp: 30 mL, Rfl: 6    insulin degludec (TRESIBA FLEXTOUCH U-100) 100 unit/mL (3 mL) insulin pen, Inject 40 Units into the skin once daily., Disp: 15 pen, Rfl: 3    irbesartan (AVAPRO) 300 MG tablet, Take 1 tablet (300 mg total) by mouth every evening., Disp: 90 tablet, Rfl: 3    labetaloL (NORMODYNE) 300 MG tablet, Take 1 tablet (300 mg total) by mouth 2 (two) times daily., Disp: 180 tablet, Rfl: 6    lancets 31 gauge Misc, 1 lancet by Misc.(Non-Drug; Combo Route) route 4 (four) times daily. E11.42 . Prescribed one touch, Disp: 200 each, Rfl: 6    multivitamin (THERAGRAN) per tablet, Take 1 tablet by mouth once daily., Disp: , Rfl:     pregabalin (LYRICA) 150 MG capsule, Take 1 capsule (150 mg total) by mouth 2 (two) times daily., Disp: 60 capsule, Rfl: 6    simvastatin (ZOCOR) 40 MG tablet, Take 1 tablet (40 mg total) by mouth every evening., Disp: 90 tablet, Rfl: 3    sodium bicarbonate 325 MG tablet, Take 2 tablets (650 mg total) by mouth 2 (two) times daily., Disp: 120 tablet, Rfl: 11    dulaglutide (TRULICITY) 3 mg/0.5 mL pen injector, Inject 3 mg into the skin every 7 days., Disp: 12 pen, Rfl: 3    famotidine (PEPCID) 20 MG tablet, Take 1 tablet (20 mg total) by mouth 2 (two) times daily., Disp: 1 tablet, Rfl: 0  No current facility-administered medications for this visit.    Facility-Administered Medications Ordered in Other Visits:     fentaNYL injection 25 mcg, 25 mcg, Intravenous, Q5 Min PRN, Desmond Fontana MD, 50 mcg at 01/08/21 0955    midazolam (VERSED) 1 mg/mL injection 0.5 mg, 0.5 mg,  "Intravenous, PRN, Desmond Fontana MD, 2 mg at 01/08/21 0955         Vitals:    09/02/22 0858   BP: 111/71   BP Location: Left arm   Patient Position: Sitting   BP Method: Large (Manual)   Pulse: 76   SpO2: 98%   Weight: 132.2 kg (291 lb 5.4 oz)   Height: 5' 5" (1.651 m)     Body mass index is 48.48 kg/m².  Physical Exam  Vitals and nursing note reviewed.   Constitutional:       General: She is not in acute distress.     Appearance: Normal appearance. She is morbidly obese. She is not ill-appearing.   HENT:      Head: Normocephalic and atraumatic.   Eyes:      General: No scleral icterus.        Right eye: No discharge.         Left eye: No discharge.      Extraocular Movements: Extraocular movements intact.      Conjunctiva/sclera: Conjunctivae normal.   Cardiovascular:      Rate and Rhythm: Normal rate and regular rhythm.      Heart sounds: Normal heart sounds. No murmur heard.  Pulmonary:      Effort: Pulmonary effort is normal. No respiratory distress.      Breath sounds: Normal breath sounds. No wheezing, rhonchi or rales.   Musculoskeletal:      Cervical back: Normal range of motion.      Right lower leg: No edema.      Left lower leg: No edema.      Comments: + rollator walker    Skin:     General: Skin is warm and dry.      Findings: No rash.   Neurological:      Mental Status: She is alert and oriented to person, place, and time.   Psychiatric:         Mood and Affect: Mood normal.         Behavior: Behavior normal. Behavior is cooperative.         Cognition and Memory: Cognition and memory normal.             Diagnoses and health risks identified today and associated recommendations/orders:    1. Encounter for preventive health examination  - Chart reviewed. Problem list updated. Discussed current medical diagnosis, current medications, medical/surgical/family/social history; updated provider list; documented vital signs; identified any cognitive impairment; and updated risk factor list. " Addressed any outstanding health maintenance. Provided patient with personalized health advice. Continue to follow up with PCP and any specialists.       2. Dyslipidemia associated with type 2 diabetes mellitus  Chronic; stable on current treatment plan; follow up with PCP  - taking statin     3. Morbid obesity  - Recommendation for healthy diet and increasing exercise as tolerated with goal of 150min/week . Recommend weight loss    4. Ectatic aorta  Chronic; stable on current treatment plan; follow up with PCP  - taking statin     5. Hypertensive heart and chronic kidney disease with heart failure and stage 1 through stage 4 chronic kidney disease, or unspecified chronic kidney disease  Chronic; stable on current treatment plan; follow up with PCP  - recommend low sodium diet     6. Anemia in stage 3b chronic kidney disease  Chronic; stable on current treatment plan; follow up with PCP  - recommend avoiding NSAIDS   - follow up with Nephrology     7. Stage 3b chronic kidney disease  Chronic; stable on current treatment plan; follow up with PCP  - avoid NSAIDS     8. Secondary hyperparathyroidism  Chronic; stable on current treatment plan; follow up with PCP  - follows with Nephrology     9. Diabetic autonomic neuropathy associated with type 2 diabetes mellitus  Chronic; stable on current treatment plan; follow up with PCP  - follows with diabetes education  - taking pregabalin    10. Unspecified inflammatory spondylopathy, lumbar region  Chronic; stable on current treatment plan; follow up with PCP    11. Hypoglycemia unawareness associated with type 2 diabetes mellitus  Chronic; stable on current treatment plan; follow up with PCP  - takes insulin; recommend checking glucose as recommended by PCP    12. Type 2 diabetes mellitus with stage 3b chronic kidney disease and hypertension  Chronic; stable on current treatment plan; follow up with PCP  - recommend avoiding NSAIDS     13. Type 2 diabetes mellitus with both  eyes affected by proliferative retinopathy and macular edema, with long-term current use of insulin  Chronic; stable on current treatment plan; follow up with PCP  - recommend routine eye exams     14. DDD (degenerative disc disease), lumbar  Chronic; stable on current treatment plan; follow up with PCP    15. RUFINA (obstructive sleep apnea)  Chronic; stable on current treatment plan; follow up with PCP    16. Gastroesophageal reflux disease without esophagitis  Chronic; stable on current treatment plan; follow up with PCP  - taking pepcid    17. Abnormality of gait and mobility  Chronic; stable on current treatment plan; follow up with PCP  - using rollator walker       Provided Annika with a 5-10 year written screening schedule and personal prevention plan. Recommendations were developed using the USPSTF age appropriate recommendations. Education, counseling, and referrals were provided as needed. After Visit Summary printed and given to patient which includes a list of additional screenings\tests needed.    Follow up in about 1 year (around 9/2/2023) for your next annual wellness visit.    Marta Simon, FNP-C    Advance Care Planning       I offered to discuss advanced care planning, including how to pick a person who would make decisions for you if you were unable to make them for yourself, called a health care power of , and what kind of decisions you might make such as use of life sustaining treatments such as ventilators and tube feeding when faced with a life limiting illness recorded on a living will that they will need to know. (How you want to be cared for as you near the end of your natural life)     X Patient is interested in learning more about how to make advanced directives.  I provided them paperwork and offered to discuss this with them.

## 2022-09-02 ENCOUNTER — OFFICE VISIT (OUTPATIENT)
Dept: INTERNAL MEDICINE | Facility: CLINIC | Age: 72
End: 2022-09-02
Payer: MEDICARE

## 2022-09-02 VITALS
HEART RATE: 76 BPM | DIASTOLIC BLOOD PRESSURE: 71 MMHG | SYSTOLIC BLOOD PRESSURE: 111 MMHG | BODY MASS INDEX: 48.54 KG/M2 | HEIGHT: 65 IN | WEIGHT: 291.31 LBS | OXYGEN SATURATION: 98 %

## 2022-09-02 DIAGNOSIS — E11.22 TYPE 2 DIABETES MELLITUS WITH STAGE 3B CHRONIC KIDNEY DISEASE AND HYPERTENSION: ICD-10-CM

## 2022-09-02 DIAGNOSIS — E11.3513 TYPE 2 DIABETES MELLITUS WITH BOTH EYES AFFECTED BY PROLIFERATIVE RETINOPATHY AND MACULAR EDEMA, WITH LONG-TERM CURRENT USE OF INSULIN: ICD-10-CM

## 2022-09-02 DIAGNOSIS — E11.43 DIABETIC AUTONOMIC NEUROPATHY ASSOCIATED WITH TYPE 2 DIABETES MELLITUS: ICD-10-CM

## 2022-09-02 DIAGNOSIS — E66.01 MORBID OBESITY: ICD-10-CM

## 2022-09-02 DIAGNOSIS — M46.96 UNSPECIFIED INFLAMMATORY SPONDYLOPATHY, LUMBAR REGION: ICD-10-CM

## 2022-09-02 DIAGNOSIS — D63.1 ANEMIA IN STAGE 3B CHRONIC KIDNEY DISEASE: ICD-10-CM

## 2022-09-02 DIAGNOSIS — M51.36 DDD (DEGENERATIVE DISC DISEASE), LUMBAR: ICD-10-CM

## 2022-09-02 DIAGNOSIS — K21.9 GASTROESOPHAGEAL REFLUX DISEASE WITHOUT ESOPHAGITIS: ICD-10-CM

## 2022-09-02 DIAGNOSIS — R26.9 ABNORMALITY OF GAIT AND MOBILITY: ICD-10-CM

## 2022-09-02 DIAGNOSIS — I13.0 HYPERTENSIVE HEART AND CHRONIC KIDNEY DISEASE WITH HEART FAILURE AND STAGE 1 THROUGH STAGE 4 CHRONIC KIDNEY DISEASE, OR UNSPECIFIED CHRONIC KIDNEY DISEASE: ICD-10-CM

## 2022-09-02 DIAGNOSIS — E11.649 HYPOGLYCEMIA UNAWARENESS ASSOCIATED WITH TYPE 2 DIABETES MELLITUS: Chronic | ICD-10-CM

## 2022-09-02 DIAGNOSIS — N18.32 STAGE 3B CHRONIC KIDNEY DISEASE: ICD-10-CM

## 2022-09-02 DIAGNOSIS — N25.81 SECONDARY HYPERPARATHYROIDISM: ICD-10-CM

## 2022-09-02 DIAGNOSIS — I77.819 ECTATIC AORTA: ICD-10-CM

## 2022-09-02 DIAGNOSIS — N18.32 TYPE 2 DIABETES MELLITUS WITH STAGE 3B CHRONIC KIDNEY DISEASE AND HYPERTENSION: ICD-10-CM

## 2022-09-02 DIAGNOSIS — E11.69 DYSLIPIDEMIA ASSOCIATED WITH TYPE 2 DIABETES MELLITUS: ICD-10-CM

## 2022-09-02 DIAGNOSIS — Z00.00 ENCOUNTER FOR PREVENTIVE HEALTH EXAMINATION: Primary | ICD-10-CM

## 2022-09-02 DIAGNOSIS — G47.33 OSA (OBSTRUCTIVE SLEEP APNEA): ICD-10-CM

## 2022-09-02 DIAGNOSIS — I12.9 TYPE 2 DIABETES MELLITUS WITH STAGE 3B CHRONIC KIDNEY DISEASE AND HYPERTENSION: ICD-10-CM

## 2022-09-02 DIAGNOSIS — Z79.4 TYPE 2 DIABETES MELLITUS WITH BOTH EYES AFFECTED BY PROLIFERATIVE RETINOPATHY AND MACULAR EDEMA, WITH LONG-TERM CURRENT USE OF INSULIN: ICD-10-CM

## 2022-09-02 DIAGNOSIS — E78.5 DYSLIPIDEMIA ASSOCIATED WITH TYPE 2 DIABETES MELLITUS: ICD-10-CM

## 2022-09-02 DIAGNOSIS — N18.32 ANEMIA IN STAGE 3B CHRONIC KIDNEY DISEASE: ICD-10-CM

## 2022-09-02 PROCEDURE — 3078F PR MOST RECENT DIASTOLIC BLOOD PRESSURE < 80 MM HG: ICD-10-PCS | Mod: CPTII,S$GLB,, | Performed by: NURSE PRACTITIONER

## 2022-09-02 PROCEDURE — 3074F PR MOST RECENT SYSTOLIC BLOOD PRESSURE < 130 MM HG: ICD-10-PCS | Mod: CPTII,S$GLB,, | Performed by: NURSE PRACTITIONER

## 2022-09-02 PROCEDURE — 1170F FXNL STATUS ASSESSED: CPT | Mod: CPTII,S$GLB,, | Performed by: NURSE PRACTITIONER

## 2022-09-02 PROCEDURE — 1160F RVW MEDS BY RX/DR IN RCRD: CPT | Mod: CPTII,S$GLB,, | Performed by: NURSE PRACTITIONER

## 2022-09-02 PROCEDURE — 3051F PR MOST RECENT HEMOGLOBIN A1C LEVEL 7.0 - < 8.0%: ICD-10-PCS | Mod: CPTII,S$GLB,, | Performed by: NURSE PRACTITIONER

## 2022-09-02 PROCEDURE — 3051F HG A1C>EQUAL 7.0%<8.0%: CPT | Mod: CPTII,S$GLB,, | Performed by: NURSE PRACTITIONER

## 2022-09-02 PROCEDURE — 99999 PR PBB SHADOW E&M-EST. PATIENT-LVL V: CPT | Mod: PBBFAC,,, | Performed by: NURSE PRACTITIONER

## 2022-09-02 PROCEDURE — 3066F PR DOCUMENTATION OF TREATMENT FOR NEPHROPATHY: ICD-10-PCS | Mod: CPTII,S$GLB,, | Performed by: NURSE PRACTITIONER

## 2022-09-02 PROCEDURE — 3074F SYST BP LT 130 MM HG: CPT | Mod: CPTII,S$GLB,, | Performed by: NURSE PRACTITIONER

## 2022-09-02 PROCEDURE — 99499 RISK ADDL DX/OHS AUDIT: ICD-10-PCS | Mod: S$GLB,,, | Performed by: NURSE PRACTITIONER

## 2022-09-02 PROCEDURE — 3078F DIAST BP <80 MM HG: CPT | Mod: CPTII,S$GLB,, | Performed by: NURSE PRACTITIONER

## 2022-09-02 PROCEDURE — 99499 UNLISTED E&M SERVICE: CPT | Mod: S$GLB,,, | Performed by: NURSE PRACTITIONER

## 2022-09-02 PROCEDURE — 1170F PR FUNCTIONAL STATUS ASSESSED: ICD-10-PCS | Mod: CPTII,S$GLB,, | Performed by: NURSE PRACTITIONER

## 2022-09-02 PROCEDURE — 3288F PR FALLS RISK ASSESSMENT DOCUMENTED: ICD-10-PCS | Mod: CPTII,S$GLB,, | Performed by: NURSE PRACTITIONER

## 2022-09-02 PROCEDURE — 1160F PR REVIEW ALL MEDS BY PRESCRIBER/CLIN PHARMACIST DOCUMENTED: ICD-10-PCS | Mod: CPTII,S$GLB,, | Performed by: NURSE PRACTITIONER

## 2022-09-02 PROCEDURE — 1159F PR MEDICATION LIST DOCUMENTED IN MEDICAL RECORD: ICD-10-PCS | Mod: CPTII,S$GLB,, | Performed by: NURSE PRACTITIONER

## 2022-09-02 PROCEDURE — 3008F BODY MASS INDEX DOCD: CPT | Mod: CPTII,S$GLB,, | Performed by: NURSE PRACTITIONER

## 2022-09-02 PROCEDURE — 3060F PR POS MICROALBUMINURIA RESULT DOCUMENTED/REVIEW: ICD-10-PCS | Mod: CPTII,S$GLB,, | Performed by: NURSE PRACTITIONER

## 2022-09-02 PROCEDURE — 3008F PR BODY MASS INDEX (BMI) DOCUMENTED: ICD-10-PCS | Mod: CPTII,S$GLB,, | Performed by: NURSE PRACTITIONER

## 2022-09-02 PROCEDURE — 1159F MED LIST DOCD IN RCRD: CPT | Mod: CPTII,S$GLB,, | Performed by: NURSE PRACTITIONER

## 2022-09-02 PROCEDURE — 99999 PR PBB SHADOW E&M-EST. PATIENT-LVL V: ICD-10-PCS | Mod: PBBFAC,,, | Performed by: NURSE PRACTITIONER

## 2022-09-02 PROCEDURE — G0439 PR MEDICARE ANNUAL WELLNESS SUBSEQUENT VISIT: ICD-10-PCS | Mod: S$GLB,,, | Performed by: NURSE PRACTITIONER

## 2022-09-02 PROCEDURE — 4010F ACE/ARB THERAPY RXD/TAKEN: CPT | Mod: CPTII,S$GLB,, | Performed by: NURSE PRACTITIONER

## 2022-09-02 PROCEDURE — 3060F POS MICROALBUMINURIA REV: CPT | Mod: CPTII,S$GLB,, | Performed by: NURSE PRACTITIONER

## 2022-09-02 PROCEDURE — 1101F PT FALLS ASSESS-DOCD LE1/YR: CPT | Mod: CPTII,S$GLB,, | Performed by: NURSE PRACTITIONER

## 2022-09-02 PROCEDURE — 3288F FALL RISK ASSESSMENT DOCD: CPT | Mod: CPTII,S$GLB,, | Performed by: NURSE PRACTITIONER

## 2022-09-02 PROCEDURE — 3066F NEPHROPATHY DOC TX: CPT | Mod: CPTII,S$GLB,, | Performed by: NURSE PRACTITIONER

## 2022-09-02 PROCEDURE — G0439 PPPS, SUBSEQ VISIT: HCPCS | Mod: S$GLB,,, | Performed by: NURSE PRACTITIONER

## 2022-09-02 PROCEDURE — 4010F PR ACE/ARB THEARPY RXD/TAKEN: ICD-10-PCS | Mod: CPTII,S$GLB,, | Performed by: NURSE PRACTITIONER

## 2022-09-02 PROCEDURE — 1101F PR PT FALLS ASSESS DOC 0-1 FALLS W/OUT INJ PAST YR: ICD-10-PCS | Mod: CPTII,S$GLB,, | Performed by: NURSE PRACTITIONER

## 2022-09-02 NOTE — PATIENT INSTRUCTIONS
Counseling and Referral of Other Preventative  (Italic type indicates deductible and co-insurance are waived)    Patient Name: Annika Mac  Today's Date: 9/2/2022    Health Maintenance       Date Due Completion Date    Influenza Vaccine (1) 09/12/2022 (Originally 9/1/2022) 10/22/2021    Override on 10/1/2018: Done (done at [facility])    COVID-19 Vaccine (4 - Booster for Moderna series) 09/19/2022 (Originally 4/29/2022) 12/29/2021    Mammogram 12/01/2022 12/1/2021    Lipid Panel 01/07/2023 1/7/2022    Hemoglobin A1c 02/24/2023 8/24/2022    Colorectal Cancer Screening 02/26/2023 2/26/2018    Diabetes Urine Screening 04/11/2023 4/11/2022    Eye Exam 06/15/2023 6/15/2022    Foot Exam 08/23/2023 8/23/2022    Override on 4/10/2022: Done    Override on 9/10/2021: Done (Seeing Dr. Pacheco regularly)    Override on 7/28/2020: Done    Override on 6/13/2019: Done ([facility])    Override on 5/18/2018: Done    Override on 7/11/2017: Done    High Dose Statin 09/02/2023 9/2/2022    TETANUS VACCINE 08/24/2028 8/24/2018    Override on 9/30/2005: Done        No orders of the defined types were placed in this encounter.    The following information is provided to all patients.  This information is to help you find resources for any of the problems found today that may be affecting your health:                Living healthy guide: www.[facility].[place].gov      Understanding Diabetes: www.diabetes.org      Eating healthy: www.cdc.gov/healthyweight      CDC home safety checklist: www.cdc.gov/steadi/patient.html      Agency on Aging: www.goea.[place].gov      Alcoholics anonymous (AA): www.aa.org      Physical Activity: www.reic.nih.gov/lz6morg      Tobacco use: www.quitwithusla.org

## 2022-09-09 ENCOUNTER — PATIENT OUTREACH (OUTPATIENT)
Dept: ADMINISTRATIVE | Facility: HOSPITAL | Age: 72
End: 2022-09-09
Payer: MEDICARE

## 2022-09-12 ENCOUNTER — TELEPHONE (OUTPATIENT)
Dept: ENDOCRINOLOGY | Facility: HOSPITAL | Age: 72
End: 2022-09-12
Payer: MEDICARE

## 2022-09-12 DIAGNOSIS — Z79.4 TYPE 2 DIABETES MELLITUS WITH DIABETIC POLYNEUROPATHY, WITH LONG-TERM CURRENT USE OF INSULIN: Primary | ICD-10-CM

## 2022-09-12 DIAGNOSIS — E11.42 TYPE 2 DIABETES MELLITUS WITH DIABETIC POLYNEUROPATHY, WITH LONG-TERM CURRENT USE OF INSULIN: Primary | ICD-10-CM

## 2022-09-12 RX ORDER — DULAGLUTIDE 3 MG/.5ML
3 INJECTION, SOLUTION SUBCUTANEOUS
Qty: 12 PEN | Refills: 3 | Status: SHIPPED | OUTPATIENT
Start: 2022-09-12 | End: 2023-02-06 | Stop reason: DRUGHIGH

## 2022-09-12 NOTE — TELEPHONE ENCOUNTER
Please call the patient to let them know:    1) Ozempic is no longer covered on her plan, so I switched it to Trulicity. She can take it the same was as Ozempic, once per week.

## 2022-09-14 ENCOUNTER — IMMUNIZATION (OUTPATIENT)
Dept: INTERNAL MEDICINE | Facility: CLINIC | Age: 72
End: 2022-09-14
Payer: MEDICARE

## 2022-09-14 ENCOUNTER — OFFICE VISIT (OUTPATIENT)
Dept: INTERNAL MEDICINE | Facility: CLINIC | Age: 72
End: 2022-09-14
Payer: MEDICARE

## 2022-09-14 VITALS
SYSTOLIC BLOOD PRESSURE: 130 MMHG | WEIGHT: 293 LBS | HEIGHT: 65 IN | HEART RATE: 78 BPM | OXYGEN SATURATION: 98 % | BODY MASS INDEX: 48.82 KG/M2 | DIASTOLIC BLOOD PRESSURE: 58 MMHG

## 2022-09-14 DIAGNOSIS — E78.5 HYPERLIPIDEMIA, UNSPECIFIED HYPERLIPIDEMIA TYPE: ICD-10-CM

## 2022-09-14 DIAGNOSIS — M79.641 RIGHT HAND PAIN: ICD-10-CM

## 2022-09-14 DIAGNOSIS — Z23 ENCOUNTER FOR ADMINISTRATION OF VACCINE: Primary | ICD-10-CM

## 2022-09-14 DIAGNOSIS — R13.10 DYSPHAGIA, UNSPECIFIED TYPE: ICD-10-CM

## 2022-09-14 DIAGNOSIS — E11.8 TYPE 2 DIABETES MELLITUS WITH UNSPECIFIED COMPLICATIONS: ICD-10-CM

## 2022-09-14 DIAGNOSIS — I10 PRIMARY HYPERTENSION: Primary | ICD-10-CM

## 2022-09-14 DIAGNOSIS — Z12.31 ENCOUNTER FOR SCREENING MAMMOGRAM FOR BREAST CANCER: ICD-10-CM

## 2022-09-14 DIAGNOSIS — M25.511 RIGHT SHOULDER PAIN, UNSPECIFIED CHRONICITY: ICD-10-CM

## 2022-09-14 PROCEDURE — 99999 PR PBB SHADOW E&M-EST. PATIENT-LVL V: ICD-10-PCS | Mod: PBBFAC,,, | Performed by: INTERNAL MEDICINE

## 2022-09-14 PROCEDURE — 99214 OFFICE O/P EST MOD 30 MIN: CPT | Mod: S$GLB,,, | Performed by: INTERNAL MEDICINE

## 2022-09-14 PROCEDURE — 3060F POS MICROALBUMINURIA REV: CPT | Mod: CPTII,S$GLB,, | Performed by: INTERNAL MEDICINE

## 2022-09-14 PROCEDURE — 3066F PR DOCUMENTATION OF TREATMENT FOR NEPHROPATHY: ICD-10-PCS | Mod: CPTII,S$GLB,, | Performed by: INTERNAL MEDICINE

## 2022-09-14 PROCEDURE — 90694 FLU VACCINE - QUADRIVALENT - ADJUVANTED: ICD-10-PCS | Mod: S$GLB,,, | Performed by: INTERNAL MEDICINE

## 2022-09-14 PROCEDURE — 3051F HG A1C>EQUAL 7.0%<8.0%: CPT | Mod: CPTII,S$GLB,, | Performed by: INTERNAL MEDICINE

## 2022-09-14 PROCEDURE — 1159F PR MEDICATION LIST DOCUMENTED IN MEDICAL RECORD: ICD-10-PCS | Mod: CPTII,S$GLB,, | Performed by: INTERNAL MEDICINE

## 2022-09-14 PROCEDURE — 1159F MED LIST DOCD IN RCRD: CPT | Mod: CPTII,S$GLB,, | Performed by: INTERNAL MEDICINE

## 2022-09-14 PROCEDURE — 99214 PR OFFICE/OUTPT VISIT, EST, LEVL IV, 30-39 MIN: ICD-10-PCS | Mod: S$GLB,,, | Performed by: INTERNAL MEDICINE

## 2022-09-14 PROCEDURE — 1101F PR PT FALLS ASSESS DOC 0-1 FALLS W/OUT INJ PAST YR: ICD-10-PCS | Mod: CPTII,S$GLB,, | Performed by: INTERNAL MEDICINE

## 2022-09-14 PROCEDURE — 3075F SYST BP GE 130 - 139MM HG: CPT | Mod: CPTII,S$GLB,, | Performed by: INTERNAL MEDICINE

## 2022-09-14 PROCEDURE — 1160F PR REVIEW ALL MEDS BY PRESCRIBER/CLIN PHARMACIST DOCUMENTED: ICD-10-PCS | Mod: CPTII,S$GLB,, | Performed by: INTERNAL MEDICINE

## 2022-09-14 PROCEDURE — 90694 VACC AIIV4 NO PRSRV 0.5ML IM: CPT | Mod: S$GLB,,, | Performed by: INTERNAL MEDICINE

## 2022-09-14 PROCEDURE — 1125F AMNT PAIN NOTED PAIN PRSNT: CPT | Mod: CPTII,S$GLB,, | Performed by: INTERNAL MEDICINE

## 2022-09-14 PROCEDURE — 3078F PR MOST RECENT DIASTOLIC BLOOD PRESSURE < 80 MM HG: ICD-10-PCS | Mod: CPTII,S$GLB,, | Performed by: INTERNAL MEDICINE

## 2022-09-14 PROCEDURE — 4010F PR ACE/ARB THEARPY RXD/TAKEN: ICD-10-PCS | Mod: CPTII,S$GLB,, | Performed by: INTERNAL MEDICINE

## 2022-09-14 PROCEDURE — G0008 ADMIN INFLUENZA VIRUS VAC: HCPCS | Mod: S$GLB,,, | Performed by: INTERNAL MEDICINE

## 2022-09-14 PROCEDURE — 1101F PT FALLS ASSESS-DOCD LE1/YR: CPT | Mod: CPTII,S$GLB,, | Performed by: INTERNAL MEDICINE

## 2022-09-14 PROCEDURE — 3066F NEPHROPATHY DOC TX: CPT | Mod: CPTII,S$GLB,, | Performed by: INTERNAL MEDICINE

## 2022-09-14 PROCEDURE — 3008F BODY MASS INDEX DOCD: CPT | Mod: CPTII,S$GLB,, | Performed by: INTERNAL MEDICINE

## 2022-09-14 PROCEDURE — G0008 FLU VACCINE - QUADRIVALENT - ADJUVANTED: ICD-10-PCS | Mod: S$GLB,,, | Performed by: INTERNAL MEDICINE

## 2022-09-14 PROCEDURE — 3060F PR POS MICROALBUMINURIA RESULT DOCUMENTED/REVIEW: ICD-10-PCS | Mod: CPTII,S$GLB,, | Performed by: INTERNAL MEDICINE

## 2022-09-14 PROCEDURE — 1160F RVW MEDS BY RX/DR IN RCRD: CPT | Mod: CPTII,S$GLB,, | Performed by: INTERNAL MEDICINE

## 2022-09-14 PROCEDURE — 3075F PR MOST RECENT SYSTOLIC BLOOD PRESS GE 130-139MM HG: ICD-10-PCS | Mod: CPTII,S$GLB,, | Performed by: INTERNAL MEDICINE

## 2022-09-14 PROCEDURE — 3288F FALL RISK ASSESSMENT DOCD: CPT | Mod: CPTII,S$GLB,, | Performed by: INTERNAL MEDICINE

## 2022-09-14 PROCEDURE — 3008F PR BODY MASS INDEX (BMI) DOCUMENTED: ICD-10-PCS | Mod: CPTII,S$GLB,, | Performed by: INTERNAL MEDICINE

## 2022-09-14 PROCEDURE — 99999 PR PBB SHADOW E&M-EST. PATIENT-LVL V: CPT | Mod: PBBFAC,,, | Performed by: INTERNAL MEDICINE

## 2022-09-14 PROCEDURE — 1125F PR PAIN SEVERITY QUANTIFIED, PAIN PRESENT: ICD-10-PCS | Mod: CPTII,S$GLB,, | Performed by: INTERNAL MEDICINE

## 2022-09-14 PROCEDURE — 3288F PR FALLS RISK ASSESSMENT DOCUMENTED: ICD-10-PCS | Mod: CPTII,S$GLB,, | Performed by: INTERNAL MEDICINE

## 2022-09-14 PROCEDURE — 3078F DIAST BP <80 MM HG: CPT | Mod: CPTII,S$GLB,, | Performed by: INTERNAL MEDICINE

## 2022-09-14 PROCEDURE — 4010F ACE/ARB THERAPY RXD/TAKEN: CPT | Mod: CPTII,S$GLB,, | Performed by: INTERNAL MEDICINE

## 2022-09-14 PROCEDURE — 3051F PR MOST RECENT HEMOGLOBIN A1C LEVEL 7.0 - < 8.0%: ICD-10-PCS | Mod: CPTII,S$GLB,, | Performed by: INTERNAL MEDICINE

## 2022-09-14 RX ORDER — FAMOTIDINE 20 MG/1
20 TABLET, FILM COATED ORAL 2 TIMES DAILY
Qty: 1 TABLET | Refills: 0
Start: 2022-09-14 | End: 2023-09-14

## 2022-09-14 NOTE — PROGRESS NOTES
"Subjective:       Patient ID: Annika Mac is a 71 y.o. female.    Chief Complaint: Follow-up  This is a 71-year-old who presents today for followup she reports that she has been doing fairly well working on getting her diabetes controlled and with Endocrinology and seems to be doing well with insulin and trulicity.  Her A1c has improved.  Her blood pressure overall has been well controlled she reports that she has had some discomfort in her right hand after she had a fall previously and also her right shoulder she also some issues with her back she thought she had a bite and had an area that was inflamed as that seems to resolved but she still has some lingering discomfort in the area she has been using ice.  She occasionally has some issues with swallowing she does use Pepcid for reflux but sometimes she feels the food will get stuck.    Follow-up  Associated symptoms include arthralgias.   Review of Systems   Gastrointestinal:         Some difficulty swallowing at times    Musculoskeletal:  Positive for arthralgias and back pain.          Right shoulder  Right hand discomfot      Objective:    Blood pressure (!) 130/58, pulse 78, height 5' 5" (1.651 m), weight 134.7 kg (296 lb 15.4 oz), SpO2 98 %.   Physical Exam  Constitutional:       General: She is not in acute distress.  HENT:      Head: Normocephalic.      Mouth/Throat:      Pharynx: Oropharynx is clear.   Eyes:      General: No scleral icterus.  Cardiovascular:      Rate and Rhythm: Normal rate and regular rhythm.      Heart sounds: Murmur heard.     No friction rub. No gallop.   Pulmonary:      Effort: Pulmonary effort is normal. No respiratory distress.      Breath sounds: Normal breath sounds.   Abdominal:      General: Bowel sounds are normal.      Palpations: Abdomen is soft. There is no mass.      Tenderness: There is no abdominal tenderness.   Musculoskeletal:      Cervical back: Neck supple.      Comments: Table edema  Walks with walker    No wound " on back some discomfort rom right shoulder  And hand palpation    Skin:     Findings: No erythema.   Neurological:      Mental Status: She is alert.   Psychiatric:         Mood and Affect: Mood normal.       Assessment:       1. Primary hypertension    2. Type 2 diabetes mellitus with unspecified complications    3. Hyperlipidemia, unspecified hyperlipidemia type    4. Right shoulder pain, unspecified chronicity    5. Right hand pain    6. Dysphagia, unspecified type    7. Encounter for screening mammogram for breast cancer          Plan:       Annika was seen today for follow-up.    Diagnoses and all orders for this visit:    Primary hypertension  Her blood pressure acceptable will continue current regimen    Type 2 diabetes mellitus with unspecified complications  History of falling with Endocrinology has had some improvement in her numbers continue to monitor and watch her diet    Hyperlipidemia, unspecified hyperlipidemia type  Remains on medication without difficulty    Right shoulder pain, unspecified chronicity  -     Ambulatory referral/consult to Orthopedics; Future    Right hand pain  -     Ambulatory referral/consult to Orthopedics; Future  -     X-Ray Hand Complete Right; Future    Dysphagia, unspecified type  Discussed GI referral placed for further evaluation  -     Ambulatory referral/consult to Gastroenterology; Future    Encounter for screening mammogram for breast cancer  -     Mammo Digital Screening Bilat w/ [facility]; Future    Gerd continue famotidine (PEPCID) 20 MG tablet; Take 1 tablet (20 mg total) by mouth 2 (two) times daily.    Follow-up 6 months flu shot recommended

## 2022-09-14 NOTE — PROGRESS NOTES
After obtaining consent, and per orders of Dr. Mamie Cotton, injection of FluAD given in left deltoid by Maday Cisneros. Patient instructed to remain in clinic for 15 minutes afterwards, and to report any adverse reaction to staff immediately. Pt tolerated vaccine well.

## 2022-09-19 ENCOUNTER — HOSPITAL ENCOUNTER (OUTPATIENT)
Dept: RADIOLOGY | Facility: HOSPITAL | Age: 72
Discharge: HOME | End: 2022-09-19
Attending: INTERNAL MEDICINE
Payer: MEDICARE

## 2022-09-19 DIAGNOSIS — M79.641 RIGHT HAND PAIN: ICD-10-CM

## 2022-09-19 PROCEDURE — 73130 X-RAY EXAM OF HAND: CPT | Mod: 26,RT,, | Performed by: INTERNAL MEDICINE

## 2022-09-19 PROCEDURE — 73130 X-RAY EXAM OF HAND: CPT | Mod: TC,RT

## 2022-09-19 PROCEDURE — 73130 XR HAND COMPLETE 3 VIEW RIGHT: ICD-10-PCS | Mod: 26,RT,, | Performed by: INTERNAL MEDICINE

## 2022-09-26 RX ORDER — LABETALOL 300 MG/1
300 TABLET, FILM COATED ORAL 2 TIMES DAILY
Qty: 180 TABLET | Refills: 6 | Status: SHIPPED | OUTPATIENT
Start: 2022-09-26 | End: 2022-11-17 | Stop reason: SDUPTHER

## 2022-09-26 RX ORDER — ALBUTEROL SULFATE 90 UG/1
2 AEROSOL, METERED RESPIRATORY (INHALATION) EVERY 6 HOURS PRN
Qty: 18 G | Refills: 6 | Status: SHIPPED | OUTPATIENT
Start: 2022-09-26 | End: 2023-06-15 | Stop reason: SDUPTHER

## 2022-09-26 NOTE — TELEPHONE ENCOUNTER
No new care gaps identified.  [facility] Embedded Care Gaps. Reference number: [id number]. 9/26/2022   1:31:05 PM CDT

## 2022-09-26 NOTE — TELEPHONE ENCOUNTER
----- Message from Radha Sharma sent at 9/26/2022 10:12 AM CDT -----  Contact: Self/[phone]  Requesting an RX refill or new RX.  Is this a refill or new RX: New  RX name and strength :  labetaloL (NORMODYNE) 300 MG tablet  Is this a 30 day or 90 day RX: [address]  [address] - [address]  [address]  [facility] [address]  Phone: [phone] Fax: [phone]      The doctors have asked that we provide their patients with the following 2 reminders -- prescription refills can take up to 72 hours, and a friendly reminder that in the future you can use your [facility] account to request refills: pt stated that she is out of her medication        Requesting an RX refill or new RX.  Is this a refill or new RX: New  RX name and strength :  albuterol (PROAIR HFA) 90 mcg/actuation inhaler  Is this a 30 day or 90 day RX:   [address] - [address]  [address]  [facility] [address]  Phone: [phone] Fax: [phone]    The doctors have asked that we provide their patients with the following 2 reminders -- prescription refills can take up to 72 hours, and a friendly reminder that in the future you can use your [facility] account to request refills:

## 2022-10-06 ENCOUNTER — PATIENT MESSAGE (OUTPATIENT)
Dept: BARIATRICS | Facility: CLINIC | Age: 72
End: 2022-10-06
Payer: MEDICARE

## 2022-10-14 ENCOUNTER — HOSPITAL ENCOUNTER (OUTPATIENT)
Dept: RADIOLOGY | Facility: HOSPITAL | Age: 72
Discharge: HOME | End: 2022-10-14
Attending: PHYSICIAN ASSISTANT
Payer: MEDICARE

## 2022-10-14 ENCOUNTER — OFFICE VISIT (OUTPATIENT)
Dept: PHYSICAL MEDICINE AND REHAB | Facility: CLINIC | Age: 72
End: 2022-10-14
Payer: MEDICARE

## 2022-10-14 ENCOUNTER — OFFICE VISIT (OUTPATIENT)
Dept: ORTHOPEDICS | Facility: CLINIC | Age: 72
End: 2022-10-14
Payer: MEDICARE

## 2022-10-14 VITALS
DIASTOLIC BLOOD PRESSURE: 49 MMHG | SYSTOLIC BLOOD PRESSURE: 114 MMHG | BODY MASS INDEX: 48.78 KG/M2 | HEART RATE: 66 BPM | HEIGHT: 65 IN | WEIGHT: 292.75 LBS

## 2022-10-14 VITALS
WEIGHT: 293 LBS | HEIGHT: 65 IN | DIASTOLIC BLOOD PRESSURE: 65 MMHG | BODY MASS INDEX: 48.82 KG/M2 | HEART RATE: 69 BPM | SYSTOLIC BLOOD PRESSURE: 133 MMHG

## 2022-10-14 DIAGNOSIS — M25.511 RIGHT SHOULDER PAIN, UNSPECIFIED CHRONICITY: ICD-10-CM

## 2022-10-14 DIAGNOSIS — M17.11 PRIMARY OSTEOARTHRITIS OF RIGHT KNEE: ICD-10-CM

## 2022-10-14 DIAGNOSIS — Z78.9 IMPAIRED MOBILITY AND ADLS: ICD-10-CM

## 2022-10-14 DIAGNOSIS — E11.22 TYPE 2 DIABETES MELLITUS WITH STAGE 3B CHRONIC KIDNEY DISEASE AND HYPERTENSION: ICD-10-CM

## 2022-10-14 DIAGNOSIS — R29.6 FREQUENT FALLS: ICD-10-CM

## 2022-10-14 DIAGNOSIS — M19.012 PRIMARY OSTEOARTHRITIS OF BOTH SHOULDERS: ICD-10-CM

## 2022-10-14 DIAGNOSIS — M54.42 CHRONIC BILATERAL LOW BACK PAIN WITH BILATERAL SCIATICA: ICD-10-CM

## 2022-10-14 DIAGNOSIS — M54.50 CHRONIC BILATERAL LOW BACK PAIN WITHOUT SCIATICA: ICD-10-CM

## 2022-10-14 DIAGNOSIS — R26.2 DIFFICULTY WALKING: ICD-10-CM

## 2022-10-14 DIAGNOSIS — M19.042 PRIMARY OSTEOARTHRITIS OF BOTH HANDS: ICD-10-CM

## 2022-10-14 DIAGNOSIS — M25.561 CHRONIC PAIN OF RIGHT KNEE: ICD-10-CM

## 2022-10-14 DIAGNOSIS — J45.909 ASTHMA, UNSPECIFIED ASTHMA SEVERITY, UNSPECIFIED WHETHER COMPLICATED, UNSPECIFIED WHETHER PERSISTENT: ICD-10-CM

## 2022-10-14 DIAGNOSIS — E66.01 MORBID OBESITY WITH BMI OF 45.0-49.9, ADULT: ICD-10-CM

## 2022-10-14 DIAGNOSIS — M79.641 RIGHT HAND PAIN: ICD-10-CM

## 2022-10-14 DIAGNOSIS — K21.9 GASTROESOPHAGEAL REFLUX DISEASE WITHOUT ESOPHAGITIS: ICD-10-CM

## 2022-10-14 DIAGNOSIS — M54.41 CHRONIC BILATERAL LOW BACK PAIN WITH BILATERAL SCIATICA: ICD-10-CM

## 2022-10-14 DIAGNOSIS — N18.32 TYPE 2 DIABETES MELLITUS WITH STAGE 3B CHRONIC KIDNEY DISEASE AND HYPERTENSION: ICD-10-CM

## 2022-10-14 DIAGNOSIS — Z86.73 HISTORY OF STROKE: ICD-10-CM

## 2022-10-14 DIAGNOSIS — E66.01 CLASS 3 SEVERE OBESITY DUE TO EXCESS CALORIES WITH SERIOUS COMORBIDITY AND BODY MASS INDEX (BMI) OF 50.0 TO 59.9 IN ADULT: ICD-10-CM

## 2022-10-14 DIAGNOSIS — I51.89 DIASTOLIC DYSFUNCTION: Chronic | ICD-10-CM

## 2022-10-14 DIAGNOSIS — D63.1 ANEMIA IN STAGE 3B CHRONIC KIDNEY DISEASE: ICD-10-CM

## 2022-10-14 DIAGNOSIS — N18.32 ANEMIA IN STAGE 3B CHRONIC KIDNEY DISEASE: ICD-10-CM

## 2022-10-14 DIAGNOSIS — I12.9 TYPE 2 DIABETES MELLITUS WITH STAGE 3B CHRONIC KIDNEY DISEASE AND HYPERTENSION: ICD-10-CM

## 2022-10-14 DIAGNOSIS — M77.8 TENDINITIS OF RIGHT SHOULDER: Primary | ICD-10-CM

## 2022-10-14 DIAGNOSIS — Z74.09 IMPAIRED MOBILITY AND ADLS: ICD-10-CM

## 2022-10-14 DIAGNOSIS — G89.29 CHRONIC PAIN OF RIGHT KNEE: ICD-10-CM

## 2022-10-14 DIAGNOSIS — M19.041 PRIMARY OSTEOARTHRITIS OF BOTH HANDS: ICD-10-CM

## 2022-10-14 DIAGNOSIS — G89.29 CHRONIC BILATERAL LOW BACK PAIN WITHOUT SCIATICA: ICD-10-CM

## 2022-10-14 DIAGNOSIS — G89.29 CHRONIC BILATERAL LOW BACK PAIN WITH BILATERAL SCIATICA: ICD-10-CM

## 2022-10-14 DIAGNOSIS — N18.32 STAGE 3B CHRONIC KIDNEY DISEASE: ICD-10-CM

## 2022-10-14 DIAGNOSIS — R26.9 GAIT DISORDER: Primary | ICD-10-CM

## 2022-10-14 DIAGNOSIS — M19.011 PRIMARY OSTEOARTHRITIS OF BOTH SHOULDERS: ICD-10-CM

## 2022-10-14 DIAGNOSIS — R06.09 DOE (DYSPNEA ON EXERTION): ICD-10-CM

## 2022-10-14 DIAGNOSIS — M19.011 PRIMARY OSTEOARTHRITIS OF RIGHT SHOULDER: ICD-10-CM

## 2022-10-14 DIAGNOSIS — R42 DIZZINESS: ICD-10-CM

## 2022-10-14 PROCEDURE — 99999 PR PBB SHADOW E&M-EST. PATIENT-LVL IV: ICD-10-PCS | Mod: PBBFAC,,, | Performed by: PHYSICAL MEDICINE & REHABILITATION

## 2022-10-14 PROCEDURE — 3060F POS MICROALBUMINURIA REV: CPT | Mod: CPTII,S$GLB,, | Performed by: PHYSICAL MEDICINE & REHABILITATION

## 2022-10-14 PROCEDURE — 99999 PR PBB SHADOW E&M-EST. PATIENT-LVL V: ICD-10-PCS | Mod: PBBFAC,,, | Performed by: PHYSICIAN ASSISTANT

## 2022-10-14 PROCEDURE — 99499 RISK ADDL DX/OHS AUDIT: ICD-10-PCS | Mod: S$GLB,,, | Performed by: PHYSICAL MEDICINE & REHABILITATION

## 2022-10-14 PROCEDURE — 3060F PR POS MICROALBUMINURIA RESULT DOCUMENTED/REVIEW: ICD-10-PCS | Mod: CPTII,S$GLB,, | Performed by: PHYSICAL MEDICINE & REHABILITATION

## 2022-10-14 PROCEDURE — 3074F PR MOST RECENT SYSTOLIC BLOOD PRESSURE < 130 MM HG: ICD-10-PCS | Mod: CPTII,S$GLB,, | Performed by: PHYSICAL MEDICINE & REHABILITATION

## 2022-10-14 PROCEDURE — 1125F PR PAIN SEVERITY QUANTIFIED, PAIN PRESENT: ICD-10-PCS | Mod: CPTII,S$GLB,, | Performed by: PHYSICAL MEDICINE & REHABILITATION

## 2022-10-14 PROCEDURE — 4010F ACE/ARB THERAPY RXD/TAKEN: CPT | Mod: CPTII,S$GLB,, | Performed by: PHYSICAL MEDICINE & REHABILITATION

## 2022-10-14 PROCEDURE — 3078F PR MOST RECENT DIASTOLIC BLOOD PRESSURE < 80 MM HG: ICD-10-PCS | Mod: CPTII,S$GLB,, | Performed by: PHYSICIAN ASSISTANT

## 2022-10-14 PROCEDURE — 3075F PR MOST RECENT SYSTOLIC BLOOD PRESS GE 130-139MM HG: ICD-10-PCS | Mod: CPTII,S$GLB,, | Performed by: PHYSICIAN ASSISTANT

## 2022-10-14 PROCEDURE — 99499 UNLISTED E&M SERVICE: CPT | Mod: S$GLB,,, | Performed by: PHYSICAL MEDICINE & REHABILITATION

## 2022-10-14 PROCEDURE — 99214 OFFICE O/P EST MOD 30 MIN: CPT | Mod: S$GLB,,, | Performed by: PHYSICAL MEDICINE & REHABILITATION

## 2022-10-14 PROCEDURE — 3066F NEPHROPATHY DOC TX: CPT | Mod: CPTII,S$GLB,, | Performed by: PHYSICIAN ASSISTANT

## 2022-10-14 PROCEDURE — 99214 OFFICE O/P EST MOD 30 MIN: CPT | Mod: 25,S$GLB,, | Performed by: PHYSICIAN ASSISTANT

## 2022-10-14 PROCEDURE — 20610 DRAIN/INJ JOINT/BURSA W/O US: CPT | Mod: RT,S$GLB,, | Performed by: PHYSICIAN ASSISTANT

## 2022-10-14 PROCEDURE — 4010F ACE/ARB THERAPY RXD/TAKEN: CPT | Mod: CPTII,S$GLB,, | Performed by: PHYSICIAN ASSISTANT

## 2022-10-14 PROCEDURE — 20610 PR DRAIN/INJECT LARGE JOINT/BURSA: ICD-10-PCS | Mod: RT,S$GLB,, | Performed by: PHYSICIAN ASSISTANT

## 2022-10-14 PROCEDURE — 1160F RVW MEDS BY RX/DR IN RCRD: CPT | Mod: CPTII,S$GLB,, | Performed by: PHYSICIAN ASSISTANT

## 2022-10-14 PROCEDURE — 99214 PR OFFICE/OUTPT VISIT, EST, LEVL IV, 30-39 MIN: ICD-10-PCS | Mod: 25,S$GLB,, | Performed by: PHYSICIAN ASSISTANT

## 2022-10-14 PROCEDURE — 99999 PR PBB SHADOW E&M-EST. PATIENT-LVL V: CPT | Mod: PBBFAC,,, | Performed by: PHYSICIAN ASSISTANT

## 2022-10-14 PROCEDURE — 1125F PR PAIN SEVERITY QUANTIFIED, PAIN PRESENT: ICD-10-PCS | Mod: CPTII,S$GLB,, | Performed by: PHYSICIAN ASSISTANT

## 2022-10-14 PROCEDURE — 3074F SYST BP LT 130 MM HG: CPT | Mod: CPTII,S$GLB,, | Performed by: PHYSICAL MEDICINE & REHABILITATION

## 2022-10-14 PROCEDURE — 3066F NEPHROPATHY DOC TX: CPT | Mod: CPTII,S$GLB,, | Performed by: PHYSICAL MEDICINE & REHABILITATION

## 2022-10-14 PROCEDURE — 4010F PR ACE/ARB THEARPY RXD/TAKEN: ICD-10-PCS | Mod: CPTII,S$GLB,, | Performed by: PHYSICIAN ASSISTANT

## 2022-10-14 PROCEDURE — 1159F MED LIST DOCD IN RCRD: CPT | Mod: CPTII,S$GLB,, | Performed by: PHYSICIAN ASSISTANT

## 2022-10-14 PROCEDURE — 1160F PR REVIEW ALL MEDS BY PRESCRIBER/CLIN PHARMACIST DOCUMENTED: ICD-10-PCS | Mod: CPTII,S$GLB,, | Performed by: PHYSICIAN ASSISTANT

## 2022-10-14 PROCEDURE — 99214 PR OFFICE/OUTPT VISIT, EST, LEVL IV, 30-39 MIN: ICD-10-PCS | Mod: S$GLB,,, | Performed by: PHYSICAL MEDICINE & REHABILITATION

## 2022-10-14 PROCEDURE — 1159F PR MEDICATION LIST DOCUMENTED IN MEDICAL RECORD: ICD-10-PCS | Mod: CPTII,S$GLB,, | Performed by: PHYSICAL MEDICINE & REHABILITATION

## 2022-10-14 PROCEDURE — 3051F HG A1C>EQUAL 7.0%<8.0%: CPT | Mod: CPTII,S$GLB,, | Performed by: PHYSICIAN ASSISTANT

## 2022-10-14 PROCEDURE — 3051F HG A1C>EQUAL 7.0%<8.0%: CPT | Mod: CPTII,S$GLB,, | Performed by: PHYSICAL MEDICINE & REHABILITATION

## 2022-10-14 PROCEDURE — 3060F PR POS MICROALBUMINURIA RESULT DOCUMENTED/REVIEW: ICD-10-PCS | Mod: CPTII,S$GLB,, | Performed by: PHYSICIAN ASSISTANT

## 2022-10-14 PROCEDURE — 3051F PR MOST RECENT HEMOGLOBIN A1C LEVEL 7.0 - < 8.0%: ICD-10-PCS | Mod: CPTII,S$GLB,, | Performed by: PHYSICAL MEDICINE & REHABILITATION

## 2022-10-14 PROCEDURE — 73030 X-RAY EXAM OF SHOULDER: CPT | Mod: 26,RT,, | Performed by: RADIOLOGY

## 2022-10-14 PROCEDURE — 1159F PR MEDICATION LIST DOCUMENTED IN MEDICAL RECORD: ICD-10-PCS | Mod: CPTII,S$GLB,, | Performed by: PHYSICIAN ASSISTANT

## 2022-10-14 PROCEDURE — 1159F MED LIST DOCD IN RCRD: CPT | Mod: CPTII,S$GLB,, | Performed by: PHYSICAL MEDICINE & REHABILITATION

## 2022-10-14 PROCEDURE — 3075F SYST BP GE 130 - 139MM HG: CPT | Mod: CPTII,S$GLB,, | Performed by: PHYSICIAN ASSISTANT

## 2022-10-14 PROCEDURE — 1125F AMNT PAIN NOTED PAIN PRSNT: CPT | Mod: CPTII,S$GLB,, | Performed by: PHYSICAL MEDICINE & REHABILITATION

## 2022-10-14 PROCEDURE — 3066F PR DOCUMENTATION OF TREATMENT FOR NEPHROPATHY: ICD-10-PCS | Mod: CPTII,S$GLB,, | Performed by: PHYSICIAN ASSISTANT

## 2022-10-14 PROCEDURE — 99999 PR PBB SHADOW E&M-EST. PATIENT-LVL IV: CPT | Mod: PBBFAC,,, | Performed by: PHYSICAL MEDICINE & REHABILITATION

## 2022-10-14 PROCEDURE — 1125F AMNT PAIN NOTED PAIN PRSNT: CPT | Mod: CPTII,S$GLB,, | Performed by: PHYSICIAN ASSISTANT

## 2022-10-14 PROCEDURE — 73030 XR SHOULDER COMPLETE 2 OR MORE VIEWS RIGHT: ICD-10-PCS | Mod: 26,RT,, | Performed by: RADIOLOGY

## 2022-10-14 PROCEDURE — 73030 X-RAY EXAM OF SHOULDER: CPT | Mod: TC,RT

## 2022-10-14 PROCEDURE — 3078F PR MOST RECENT DIASTOLIC BLOOD PRESSURE < 80 MM HG: ICD-10-PCS | Mod: CPTII,S$GLB,, | Performed by: PHYSICAL MEDICINE & REHABILITATION

## 2022-10-14 PROCEDURE — 4010F PR ACE/ARB THEARPY RXD/TAKEN: ICD-10-PCS | Mod: CPTII,S$GLB,, | Performed by: PHYSICAL MEDICINE & REHABILITATION

## 2022-10-14 PROCEDURE — 3060F POS MICROALBUMINURIA REV: CPT | Mod: CPTII,S$GLB,, | Performed by: PHYSICIAN ASSISTANT

## 2022-10-14 PROCEDURE — 3066F PR DOCUMENTATION OF TREATMENT FOR NEPHROPATHY: ICD-10-PCS | Mod: CPTII,S$GLB,, | Performed by: PHYSICAL MEDICINE & REHABILITATION

## 2022-10-14 PROCEDURE — 3051F PR MOST RECENT HEMOGLOBIN A1C LEVEL 7.0 - < 8.0%: ICD-10-PCS | Mod: CPTII,S$GLB,, | Performed by: PHYSICIAN ASSISTANT

## 2022-10-14 PROCEDURE — 3078F DIAST BP <80 MM HG: CPT | Mod: CPTII,S$GLB,, | Performed by: PHYSICAL MEDICINE & REHABILITATION

## 2022-10-14 PROCEDURE — 3078F DIAST BP <80 MM HG: CPT | Mod: CPTII,S$GLB,, | Performed by: PHYSICIAN ASSISTANT

## 2022-10-14 RX ORDER — BETAMETHASONE SODIUM PHOSPHATE AND BETAMETHASONE ACETATE 3; 3 MG/ML; MG/ML
6 INJECTION, SUSPENSION INTRA-ARTICULAR; INTRALESIONAL; INTRAMUSCULAR; SOFT TISSUE
Status: COMPLETED | OUTPATIENT
Start: 2022-10-14 | End: 2022-10-14

## 2022-10-14 RX ADMIN — BETAMETHASONE SODIUM PHOSPHATE AND BETAMETHASONE ACETATE 6 MG: 3; 3 INJECTION, SUSPENSION INTRA-ARTICULAR; INTRALESIONAL; INTRAMUSCULAR; SOFT TISSUE at 02:10

## 2022-10-14 NOTE — PROGRESS NOTES
Subjective:       Patient ID: Annika Mac is a 71 y.o. female.    Chief Complaint: No chief complaint on file.      HPI      Mrs. Mac is a 71-year-old black female with multiple medical problems who is presenting to the Physical Medicine Clinic for evaluation for a power mobility device.  Her past medical history significant for hypertension, diabetes mellitus type 2, diastolic heart dysfunction, asthma, dyspnea on exertion, stroke with right hemiparesis in 2002, generalized osteoarthritis, chronic low back pain with bilateral radiculopathy status post multiple spine injections, OA of the knees, status post left total knee arthroplasty in 2006, status post left 1st carpometacarpal joint arthroplasty in 01/2021, morbid obesity (weight of 296 lb and BMI 49.3 today), obstructive sleep apnea on CPAP.      The patient lives with her  in a single-story home with 1 small step to enter.  She is independent with feeding herself but has trouble getting to the kitchen.  She is dependent with resting but it takes her a long time.  She is independent with toileting but has trouble getting up and down from the toilet seat, she is independent with bathing using transfer tub bench.  She ambulates with a Rollator walker about 15 ft.  She is restricted by shortness of breath, chronic low back pain with bilateral sciatica (up to 7-8/10), right knee pain (7-8/10, and leg weakness.  She reports history of frequent falls occasionally with significant trauma such as in 06/2022 when she injured her right wrist and shoulder.  She cannot propel a manual wheelchair due to shortness of breath, upper extremity weakness, bilateral shoulder pain (7-8/10), and bilateral hand pain (up 7-8/10).  She indicates she was able to ride a scooter in [email or web address] and steroid without significant problems.    Past Medical History:   Diagnosis Date    Asthma     Chronic obstructive pulmonary disease, unspecified COPD type 1/14/2022     Constipation 10/3/2019    Deep vein thrombophlebitis of left leg 7/27/2012    Deep vein thrombosis     when she had a knee replacement    Diabetic foot ulcer with osteomyelitis     Encounter for blood transfusion     anemic     GERD (gastroesophageal reflux disease)     Obesity, diabetes, and hypertension syndrome 2/13/2019    Obstructive sleep apnea 7/27/2012    PAD (peripheral artery disease) 11/21/2013    Pressure ulcer of toe of left foot     Type 2 diabetes mellitus with obesity 8/19/2020    Type 2 diabetes mellitus with peripheral artery disease 8/19/2020        Review of patient's allergies indicates:   Allergen Reactions    Clindamycin Hives and Swelling    Iodinated contrast media Rash        Review of Systems   Constitutional:  Positive for fatigue.   Eyes:  Negative for visual disturbance.   Respiratory:  Positive for shortness of breath.    Cardiovascular:  Negative for chest pain.   Gastrointestinal:  Negative for nausea and vomiting.   Genitourinary:  Negative for difficulty urinating.   Musculoskeletal:  Positive for arthralgias, back pain, gait problem and neck pain.   Neurological:  Positive for headaches. Negative for dizziness.   Psychiatric/Behavioral:  Positive for sleep disturbance. Negative for behavioral problems.            Objective:      Physical Exam  Vitals reviewed.   Constitutional:       General: She is not in acute distress.     Appearance: She is well-developed.      Comments: Coming to the clinic in a manual wheelchair propelled by my medical assistant     HENT:      Head: Normocephalic and atraumatic.   Cardiovascular:      Rate and Rhythm: Normal rate and regular rhythm.      Heart sounds: Normal heart sounds.   Pulmonary:      Effort: Pulmonary effort is normal.      Breath sounds: Normal breath sounds.   Abdominal:      Palpations: Abdomen is soft.   Musculoskeletal:      Cervical back: Normal range of motion.      Comments: BUE:  ROM:   RUE: full.   LUE: full.  +ve Deirdre's  & Frederick's nodes.  Healed surgical scar left radial wrist area.  Strength:    RUE: 4-/5 at shoulder abduction, 4 elbow flexion, 4 elbow extension, 4 hand .   LUE: 5-/5 at shoulder abduction, 5 elbow flexion, 5 elbow extension, 5 hand .  Sensation to pinprick:   RUE: intact.   LUE: intact.  DTR:    RUE: +2 biceps, +2 triceps.   LUE:  +1 biceps, +1 triceps.      BLE:  ROM:   RLE: full. +ve severe crepitus   LLE: full. Healed TKA scar.  Strength:    RLE: 4/5 at hip flexion, 5 knee extension, 5 ankle DF, 5 ankle PF.   LLE: 4/5 at hip flexion, 4+ knee extension, 4 ankle DF,  4 ankle PF.  Sensation to pinprick:     RLE: intact.      LLE: intact.   DTR:     RLE: +1 knee, +1 ankle.    LLE: +1 knee, +1 ankle.  SLR (sitting):      RLE: +ve.      LLE: +ve.     +ve moderate tenderness over lumbar spine.     Skin:     General: Skin is warm.   Neurological:      Mental Status: She is alert and oriented to person, place, and time.       Assessment:       1. Gait disorder    2. Impaired mobility and ADLs    3. Asthma, unspecified asthma severity, unspecified whether complicated, unspecified whether persistent    4. Diastolic dysfunction    5. MCFADDEN (dyspnea on exertion)    6. Chronic bilateral low back pain with bilateral sciatica    7. Chronic pain of right knee    8. Primary osteoarthritis of right knee    9. Frequent falls    10. Primary osteoarthritis of both shoulders    11. Primary osteoarthritis of both hands    12. Morbid obesity with BMI of 45.0-49.9, adult          Summary/Plan:           - The patient was seen today for mobility evaluation for a power mobility device due to significant impairment at home.  - The patient has multifactorial gait impairment.  - The patient is not able to ambulate safely at home.  - The patient is unable to use a walker functional distances due to MCFADDEN because of asthma and diastolic dysfunction, chronic low back pain with bilateral radiculopathy due to advanced DJD, chronic right  knee pain due to advanced OA (not candidate for surgery due to morbid obesity), and bilateral lower extremity weakness worse on the right due to history of CVA.  - The patient is unable to use an optimally-configured manual wheelchair at home due to MCFADDEN because of asthma and diastolic dysfunction, chronic bilateral shoulder pain worse on the right, chronic bilateral hand pain, right hemiparesis due to CVA, and higher energy requirements for wheelchair propulsion due to morbid obesity.  - The patient has history of falls, which could be detrimental to her health.  - The patient has intact cognition and should be able to use a power mobility device well at home.  - A prescription for an electric scooter was generated.  - The patient has enough upper & lower extremity strength to be able to transfer to and from the power mobility device.  - The patient has enough range of motion & strength in BUE to allow functional operation of the tiller.  - The patient has good trunk balance and should be able to maintain a safe posture while operating a power mobility device.  - This will allow the patient to go safely to the kitchen, dining room or living room for feeding & socialization.  It should also help with energy conservation and reducing the risk of falls.  - A prescription for a 3-in-1 commode was also generated to improve safety of toileting.  The ability to adjust the height with facility transfers from and to the toilet.  - The patient is to return the Physical Medicine/Mobility clinic prn.      This note was partly generated with [employer] voice recognition software. I apologize for any possible typographical errors.

## 2022-10-14 NOTE — PROGRESS NOTES
"  SUBJECTIVE:     Chief Complaint & History of Present Illness:  Annika Mac is a  Established  patient 71 y.o. female who is seen here today with a complaint of    Chief Complaint   Patient presents with    Right Shoulder - Pain, Injury    Right Hand - Injury, Pain    .  She has patient well-known to me was last seen treated the clinic by me 05/22/2020 for her left shoulder from which she is doing very well.  She plan have her pain soreness decreased range of motion of the right shoulder following a fall onto the shoulder.  She has had decreased range of motion secondary to pain difficulty sleeping cannot lie on that side.  She has not had any formal treatment for her shoulder to this point  On a scale of 1-10, with 10 being worst pain imaginable, he rates this pain as 7 on good days and 9 on bad days.  she describes the pain as sore and achy.    Review of patient's allergies indicates:   Allergen Reactions    Clindamycin Hives and Swelling    Iodinated contrast media Rash         Current Outpatient Medications   Medication Sig Dispense Refill    albuterol (PROAIR HFA) 90 mcg/actuation inhaler Inhale 2 puffs into the lungs every 6 (six) hours as needed for Wheezing. Rescue 18 g 6    amLODIPine (NORVASC) 10 MG tablet Take 1 tablet (10 mg total) by mouth once daily. 90 tablet 3    ammonium lactate 12 % Crea Apply twice daily to affected parts both feet as needed. 140 g 11    aspirin 81 MG Chew Take 1 tablet (81 mg total) by mouth once daily.  0    BD ULTRA-FINE KIKA PEN NEEDLE 32 gauge x 5/32" Ndle To use 4 times daily 200 each 20    blood sugar diagnostic Strp 1 strip 4 times daily. Contour Next. 200 strip 11    blood-glucose meter kit 1 each by Other route once daily. Use daily. Insurance proffered one touch  E11.42 1 each 0    chlorthalidone (HYGROTEN) 25 MG Tab Take 1 tablet (25 mg total) by mouth once daily. 30 tablet 11    colchicine (COLCRYS) 0.6 mg tablet TAKE 1 TABLET EVERY OTHER DAY 45 tablet 3    " dulaglutide (TRULICITY) 3 mg/0.5 mL pen injector Inject 3 mg into the skin every 7 days. 12 pen 3    DULoxetine (CYMBALTA) 30 MG capsule TAKE 1 CAPSULE BY MOUTH  ONCE DAILY 90 capsule 3    famotidine (PEPCID) 20 MG tablet Take 1 tablet (20 mg total) by mouth 2 (two) times daily. 1 tablet 0    ferrous sulfate (FEOSOL) Tab tablet Take 1 tablet (1 each total) by mouth once daily. 90 tablet 6    glucagon (GVOKE HYPOPEN 1-PACK) 0.5 mg/0.1 mL AtIn Inject 1 Dose into the skin daily as needed (for severe hypoglycemia if you aren't able to treat by ingesting sugar). 1 Syringe 3    HUMALOG KWIKPEN INSULIN 100 unit/mL pen INJECT SUBCUTANEOUSLY 11  UNITS INTO THE SKIN 3 TIMES DAILY BEFORE MEALS PLUS  SLIDING SCALE - MAX TOTAL  DAILY DOSE 63 UNITS 30 mL 6    insulin degludec (TRESIBA FLEXTOUCH U-100) 100 unit/mL (3 mL) insulin pen Inject 40 Units into the skin once daily. 15 pen 3    irbesartan (AVAPRO) 300 MG tablet Take 1 tablet (300 mg total) by mouth every evening. 90 tablet 3    labetaloL (NORMODYNE) 300 MG tablet Take 1 tablet (300 mg total) by mouth 2 (two) times daily. 180 tablet 6    lancets 31 gauge Misc 1 lancet by Misc.(Non-Drug; Combo Route) route 4 (four) times daily. E11.42 . Prescribed one touch 200 each 6    multivitamin (THERAGRAN) per tablet Take 1 tablet by mouth once daily.      pregabalin (LYRICA) 150 MG capsule Take 1 capsule (150 mg total) by mouth 2 (two) times daily. 60 capsule 6    simvastatin (ZOCOR) 40 MG tablet Take 1 tablet (40 mg total) by mouth every evening. 90 tablet 3    sodium bicarbonate 325 MG tablet Take 2 tablets (650 mg total) by mouth 2 (two) times daily. 120 tablet 11     Current Facility-Administered Medications   Medication Dose Route Frequency Provider Last Rate Last Admin    betamethasone acetate-betamethasone sodium phosphate injection 6 mg  6 mg Intra-articular 1 time in Clinic/HOD Homero Leggett PA-C         Facility-Administered Medications Ordered in Other Visits    Medication Dose Route Frequency Provider Last Rate Last Admin    fentaNYL injection 25 mcg  25 mcg Intravenous Q5 Min PRN Desmond Fontana MD   50 mcg at 21 0955    midazolam (VERSED) 1 mg/mL injection 0.5 mg  0.5 mg Intravenous PRN Desmond Fontana MD   2 mg at 21 0955       Past Medical History:   Diagnosis Date    Asthma     Chronic obstructive pulmonary disease, unspecified COPD type 2022    Constipation 10/3/2019    Deep vein thrombophlebitis of left leg 2012    Deep vein thrombosis     when she had a knee replacement    Diabetic foot ulcer with osteomyelitis     Encounter for blood transfusion     anemic     GERD (gastroesophageal reflux disease)     Obesity, diabetes, and hypertension syndrome 2019    Obstructive sleep apnea 2012    PAD (peripheral artery disease) 2013    Pressure ulcer of toe of left foot     Type 2 diabetes mellitus with obesity 2020    Type 2 diabetes mellitus with peripheral artery disease 2020       Past Surgical History:   Procedure Laterality Date    CATARACT EXTRACTION W/  INTRAOCULAR LENS IMPLANT Left 3/2002    left     CATARACT EXTRACTION W/  INTRAOCULAR LENS IMPLANT Right     OD dr. olivares     SECTION      COLONOSCOPY N/A 2018    Procedure: COLONOSCOPY;  Surgeon: Faizan Mac MD;  Location: [facility] (2ND FLR);  Service: Endoscopy;  Laterality: N/A;    CYST REMOVAL  2011    left side of face    CYSTOSCOPY W/ RETROGRADES Left 2020    Procedure: CYSTOSCOPY, WITH RETROGRADE PYELOGRAM;  Surgeon: Noman Rivas MD;  [address];  Service: Urology;  Laterality: Left;  30min    DE QUERVAIN'S RELEASE  2021    Procedure: RELEASE, HAND, FOR DEQUERVAIN'S TENOSYNOVITIS;  Surgeon: Marky Hagen MD;  Location: [facility];  Service: Orthopedics;;    EYE SURGERY Bilateral 2012    eye implants    GANGLION CYST EXCISION  2007    Right wrist    INJECTION OF ANESTHETIC  AGENT AROUND MEDIAL BRANCH NERVES INNERVATING LUMBAR FACET JOINT Bilateral 4/5/2022    Procedure: Block-nerve-medial branch-lumbar bilateral L4-5 and L5-S1;  Surgeon: Alireza Meraz Jr., MD;  [place] [facility] [place] [facility] [place]  Service: Pain Management;  Laterality: Bilateral;  pt is diabetic   asa    INJECTION OF ANESTHETIC AGENT AROUND MEDIAL BRANCH NERVES INNERVATING LUMBAR FACET JOINT Bilateral 4/19/2022    Procedure: Block-nerve-medial branch-lumbar bilaterl L4-5 and L5-S1;  Surgeon: Alireza Meraz Jr., MD;  Location: [facility];  Service: Pain Management;  Laterality: Bilateral;  pt is diabetic     INJECTION OF FACET JOINT Bilateral 5/6/2021    Procedure: INJECTION, FACET JOINT--Bilateral L4-5, L5-S1;  Surgeon: Alireza Meraz Jr., MD;  Location: [facility];  Service: Pain Management;  Laterality: Bilateral;  Oral    INTERPOSITION ARTHROPLASTY OF CARPOMETACARPAL JOINTS Left 1/8/2021    Procedure: INTERPOSITION ARTHROPLASTY, CMC JOINT - left dequervains and cmc arthroplasty, arthrex;  Surgeon: Marky Hagen MD;  Location: [facility] OR;  Service: Orthopedics;  Laterality: Left;    JOINT REPLACEMENT Left     Pan Retinal Photocoagulation Bilateral     Dr. Monterroso (Proliferative Diabetic Retinopathy)    RADIOFREQUENCY THERMOCOAGULATION Right 5/12/2022    Procedure: RADIOFREQUENCY THERMAL COAGULATION RIGHT L4-5, L5-S1 (IV Sedation);  Surgeon: Alireza Meraz Jr., MD;  Location: [facility];  Service: Pain Management;  Laterality: Right;  diabetic    RADIOFREQUENCY THERMOCOAGULATION Left 5/19/2022    Procedure: RADIOFREQUENCY THERMAL COAGULATION LEFT L4-5, L5-S1 (IV Sedation);  Surgeon: Alireza Meraz Jr., MD;  Location: [facility];  Service: Pain Management;  Laterality: Left;  diabetic    TOTAL ABDOMINAL HYSTERECTOMY  1982    TOTAL KNEE ARTHROPLASTY  2/2006    left    TRIGGER FINGER RELEASE  9/18/2009       Vital Signs (Most Recent)  Vitals:    10/14/22 1320   BP: 133/65   Pulse: 69       Review of  "Systems:  ROS:  Constitutional: no fever or chills, Morbid obesity BMI 52.09  Eyes: no visual changes, Diabetic retinopathy  ENT: no nasal congestion or sore throat  Respiratory: no cough or shortness of breath, Positive history of asthma  Cardiovascular: no chest pain or palpitations, Positive P 80, CHF, diastolic dysfunction  Gastrointestinal: no nausea or vomiting, tolerating diet, Positive for GERD  Genitourinary: no hematuria or dysuria, Positive CKD stage 4  Integument/Breast: no rash or pruritis  Hematologic/Lymphatic: no easy bruising or lymphadenopathy, Positive for iron deficiency anemia, clotting disorder, long-term use of anticoagulation, history deep vein thrombophlebitis of the left leg  Musculoskeletal: Positive for let induced gout, lumbar facet atrophy, chronic low back pain, osteoarthritis of multiple lower extremities  Neurological: Positive for polyneuropathy secondary to diabetes, degenerative disc disease, history of stroke, lumbar spinal stenosis, cervical myopathy,  Behavioral/Psych: no auditory or visual hallucinations  Endocrine: no heat or cold intolerance, Positive diabetes type 2 poorly controlled with polyneuropathy,  stage 4 kidney disease vitamin-D deficiency hyperglycemia      OBJECTIVE:     PHYSICAL EXAM:  Height: 5' 5" (165.1 cm) Weight: 134.3 kg (296 lb), General Appearance: Well nourished, well developed, in no acute distress.  Neurological: Mood & affect are normal.  Shoulder exam: right  Tenderness: globally  ROM: forward flexion 100 / 100, extension 70 / 70, full abduction 110 / 110, abduction - glenohumeral 70 / 70, external rotation 50/50, internal rotation mid sagittal line  Shoulder Strength: biceps 5/5, triceps 5/5, abduction 5/5, adduction 5/5, external rotation 5/5 with shoulder at side, flexion 5/5, and extension 5/5  positive for tenderness about the glenohumeral joint, positive for tenderness over the acromioclavicular joint, and positive for impingement " sign  Stability tests: anterior apprehension test negative and posterior apprehension test positive for pain only  Special Tests:Cross-chest abduction: diffuse pain and [place]'s test: pain deep in shoulder                     RADIOGRAPHS:  X-rays taken today films reviewed by me demonstrate mild arthritic changes throughout the shoulder with glenohumeral joint space narrowing calcific deposit noted within the rotator cuff adjacent to the greater tuberosity no evidence of fracture dislocation    ASSESSMENT/PLAN:       ICD-10-CM ICD-9-CM   1. Tendinitis of right shoulder  M77.8 726.10   2. Right shoulder pain, unspecified chronicity  M25.511 719.41   3. Right hand pain  M79.641 729.5   4. Primary osteoarthritis of right shoulder  M19.011 715.11       Plan: We discussed with the patient at length all the different treatment options available for her right shoulder including anti-inflammatories, acetaminophen, rest, ice, Physical therapy to include strengthening exercise, occasional cortisone injections for temporary relief, arthroscopic surgical repair, and finally shoulder arthroplasty.   Will proceed with therapeutic diagnostic cortisone injection of the right shoulder followed by course physical therapy       The injection site was identified and the skin was prepared with an ETOH solution. The    right  shoulder was injected with 1 ml of Celestone and 5 ml Lidocaine under sterile technique. Annika Mac tolerated the procedure well, she was advised to rest the  shoulder  today, ice and support. she did receive immediate relief of the pain in and about her  shoulder  she was told this would be short lived and is secondary to the lidocaine. she may have an increase in her discomfort tonight followed by steady improvement over the next several days. It may take 1-3 weeks following the injection to get the full benefit of the medication.  I will see her back in 4-6 months. Sooner if she has any problems or  concerns.    Annika Mac was advised to monitor her blood sugars closely over the next several days. The steroid may cause a rise in them. If her glucose levels rise to a point she is uncomfortable or he is unable to control them is is to contact his PCP or go immediately to the emergency department.

## 2022-10-24 ENCOUNTER — TELEPHONE (OUTPATIENT)
Dept: NEUROLOGY | Facility: CLINIC | Age: 72
End: 2022-10-24
Payer: MEDICARE

## 2022-10-24 NOTE — TELEPHONE ENCOUNTER
----- Message from Tremontana Chevalier sent at 10/24/2022  1:29 PM CDT -----  Regarding: pt advice  Parag from [facility] calling again. Pls review and sign the PT assessment and return to [facility]. Assessment is for PMD. Yumiko Escobar @ [phone], fax .

## 2022-11-01 ENCOUNTER — HOSPITAL ENCOUNTER (EMERGENCY)
Facility: HOSPITAL | Age: 72
Discharge: HOME | End: 2022-11-01
Attending: EMERGENCY MEDICINE
Payer: MEDICARE

## 2022-11-01 ENCOUNTER — NURSE TRIAGE (OUTPATIENT)
Dept: ADMINISTRATIVE | Facility: CLINIC | Age: 72
End: 2022-11-01
Payer: MEDICARE

## 2022-11-01 VITALS
WEIGHT: 293 LBS | RESPIRATION RATE: 18 BRPM | DIASTOLIC BLOOD PRESSURE: 49 MMHG | HEIGHT: 65 IN | HEART RATE: 62 BPM | BODY MASS INDEX: 48.82 KG/M2 | SYSTOLIC BLOOD PRESSURE: 111 MMHG | TEMPERATURE: 99 F | OXYGEN SATURATION: 96 %

## 2022-11-01 DIAGNOSIS — M25.561 ACUTE PAIN OF RIGHT KNEE: ICD-10-CM

## 2022-11-01 DIAGNOSIS — E66.9 OBESITY, UNSPECIFIED CLASSIFICATION, UNSPECIFIED OBESITY TYPE, UNSPECIFIED WHETHER SERIOUS COMORBIDITY PRESENT: ICD-10-CM

## 2022-11-01 DIAGNOSIS — M17.11 PRIMARY OSTEOARTHRITIS OF RIGHT KNEE: Primary | ICD-10-CM

## 2022-11-01 PROCEDURE — 99283 EMERGENCY DEPT VISIT LOW MDM: CPT

## 2022-11-01 PROCEDURE — 99284 EMERGENCY DEPT VISIT MOD MDM: CPT | Mod: ,,, | Performed by: EMERGENCY MEDICINE

## 2022-11-01 PROCEDURE — 25000003 PHARM REV CODE 250: Performed by: EMERGENCY MEDICINE

## 2022-11-01 PROCEDURE — 99284 PR EMERGENCY DEPT VISIT,LEVEL IV: ICD-10-PCS | Mod: ,,, | Performed by: EMERGENCY MEDICINE

## 2022-11-01 RX ORDER — HYDROCODONE BITARTRATE AND ACETAMINOPHEN 5; 325 MG/1; MG/1
1 TABLET ORAL
Status: COMPLETED | OUTPATIENT
Start: 2022-11-01 | End: 2022-11-01

## 2022-11-01 RX ORDER — HYDROCODONE BITARTRATE AND ACETAMINOPHEN 5; 325 MG/1; MG/1
1 TABLET ORAL EVERY 8 HOURS PRN
Qty: 13 TABLET | Refills: 0 | Status: SHIPPED | OUTPATIENT
Start: 2022-11-01 | End: 2023-04-18

## 2022-11-01 RX ADMIN — HYDROCODONE BITARTRATE AND ACETAMINOPHEN 1 TABLET: 5; 325 TABLET ORAL at 02:11

## 2022-11-01 NOTE — ED PROVIDER NOTES
Encounter Date: 11/1/2022    SCRIBE #1 NOTE: I, Kasey Villasenor, am scribing for, and in the presence of,  Kodi Jacob III, MD. I have scribed the following portions of the note - Other sections scribed: HPI, ROS, PE.     History     Chief Complaint   Patient presents with    Knee Pain     Pt c/o right knee pain & swelling.  Denies injury     Time patient was seen by the provider: 2:10 PM      The patient is a 71 y.o. female with past medical history of DM type 2, PAD, COPD, and GERD who presents to the ED with a complaint of knee pain onset 2 days ago. On Sunday, the patient went to [employer] and went back home. She denies any increase or change in activity. At home, rubbed her knee and took Tylenol. Her pain persisted so she came to the ED. The patient reports that a provider told her that she has bone-to-bone in her right knee needs to have a total knee replacement. She did set up an appointment yet. Patient denies hip pain.    The history is provided by the patient and medical records. No  was used.   Review of patient's allergies indicates:   Allergen Reactions    Clindamycin Hives and Swelling    Iodinated contrast media Rash     Past Medical History:   Diagnosis Date    Asthma     Chronic obstructive pulmonary disease, unspecified COPD type 1/14/2022    Constipation 10/3/2019    Deep vein thrombophlebitis of left leg 7/27/2012    Deep vein thrombosis     when she had a knee replacement    Diabetic foot ulcer with osteomyelitis     Encounter for blood transfusion     anemic     GERD (gastroesophageal reflux disease)     Obesity, diabetes, and hypertension syndrome 2/13/2019    Obstructive sleep apnea 7/27/2012    PAD (peripheral artery disease) 11/21/2013    Pressure ulcer of toe of left foot     Type 2 diabetes mellitus with obesity 8/19/2020    Type 2 diabetes mellitus with peripheral artery disease 8/19/2020     Past Surgical History:   Procedure Laterality Date    CATARACT EXTRACTION W/   INTRAOCULAR LENS IMPLANT Left 3/2002    left     CATARACT EXTRACTION W/  INTRAOCULAR LENS IMPLANT Right     OD dr. olivares     SECTION      COLONOSCOPY N/A 2018    Procedure: COLONOSCOPY;  Surgeon: Faizan Mac MD;  Location: [facility] ENDO (2ND FLR);  Service: Endoscopy;  Laterality: N/A;    CYST REMOVAL  2011    left side of face    CYSTOSCOPY W/ RETROGRADES Left 2020    Procedure: CYSTOSCOPY, WITH RETROGRADE PYELOGRAM;  Surgeon: Noman Rivas MD;  Location: [facility] OR 1ST FLR;  Service: Urology;  Laterality: Left;  30min    DE QUERVAIN'S RELEASE  2021    Procedure: RELEASE, HAND, FOR DEQUERVAIN'S TENOSYNOVITIS;  Surgeon: Marky Hagen MD;  Location: [facility];  Service: Orthopedics;;    EYE SURGERY Bilateral     eye implants    GANGLION CYST EXCISION  2007    Right wrist    INJECTION OF ANESTHETIC AGENT AROUND MEDIAL BRANCH NERVES INNERVATING LUMBAR FACET JOINT Bilateral 2022    Procedure: Block-nerve-medial branch-lumbar bilateral L4-5 and L5-S1;  Surgeon: Alireza Meraz Jr., MD;  Location: [facility] PAIN MGT;  Service: Pain Management;  Laterality: Bilateral;  pt is diabetic   asa    INJECTION OF ANESTHETIC AGENT AROUND MEDIAL BRANCH NERVES INNERVATING LUMBAR FACET JOINT Bilateral 2022    Procedure: Block-nerve-medial branch-lumbar bilaterl L4-5 and L5-S1;  Surgeon: Alireza Meraz Jr., MD;  Location: [facility] PAIN MGT;  Service: Pain Management;  Laterality: Bilateral;  pt is diabetic     INJECTION OF FACET JOINT Bilateral 2021    Procedure: INJECTION, FACET JOINT--Bilateral L4-5, L5-S1;  Surgeon: Alireza Meraz Jr., MD;  Location: [facility] PAIN MGT;  Service: Pain Management;  Laterality: Bilateral;  Oral    INTERPOSITION ARTHROPLASTY OF CARPOMETACARPAL JOINTS Left 2021    Procedure: INTERPOSITION ARTHROPLASTY, CMC JOINT - left dequervains and cmc arthroplasty, arthrex;  Surgeon: Marky Hagen MD;  Location: [facility];  Service: Orthopedics;   Laterality: Left;    JOINT REPLACEMENT Left     Pan Retinal Photocoagulation Bilateral     Dr. Monterroso (Proliferative Diabetic Retinopathy)    RADIOFREQUENCY THERMOCOAGULATION Right 5/12/2022    Procedure: RADIOFREQUENCY THERMAL COAGULATION RIGHT L4-5, L5-S1 (IV Sedation);  Surgeon: Alireza Meraz Jr., MD;  Location: [facility] PAIN MGT;  Service: Pain Management;  Laterality: Right;  diabetic    RADIOFREQUENCY THERMOCOAGULATION Left 5/19/2022    Procedure: RADIOFREQUENCY THERMAL COAGULATION LEFT L4-5, L5-S1 (IV Sedation);  Surgeon: Alireza Meraz Jr., MD;  Location: [facility] PAIN MGT;  Service: Pain Management;  Laterality: Left;  diabetic    TOTAL ABDOMINAL HYSTERECTOMY  1982    TOTAL KNEE ARTHROPLASTY  2/2006    left    TRIGGER FINGER RELEASE  9/18/2009     Family History   Problem Relation Age of Onset    Diabetes Mother     Hypertension Mother     Heart disease Father     Diabetes Sister     No Known Problems Sister     No Known Problems Brother     Thyroid disease Brother     Diabetes Brother     Hypertension Brother     No Known Problems Daughter     No Known Problems Daughter     No Known Problems Son     Amblyopia Neg Hx     Blindness Neg Hx     Glaucoma Neg Hx     Macular degeneration Neg Hx     Retinal detachment Neg Hx     Strabismus Neg Hx     Colon cancer Neg Hx     Esophageal cancer Neg Hx      Social History     Tobacco Use    Smoking status: Never    Smokeless tobacco: Never   Substance Use Topics    Alcohol use: No    Drug use: No     Review of Systems   Constitutional:  Negative for fever.   HENT:  Negative for sore throat.    Respiratory:  Negative for shortness of breath.    Cardiovascular:  Negative for chest pain.   Gastrointestinal:  Negative for nausea.   Genitourinary:  Negative for dysuria.   Musculoskeletal:  Positive for arthralgias (R knee). Negative for back pain.        Negative for R hip pain.   Skin:  Negative for rash.   Neurological:  Negative for weakness.   Hematological:  Does not  bruise/bleed easily.     Physical Exam     Initial Vitals [11/01/22 1223]   BP Pulse Resp Temp SpO2   (!) 111/49 62 16 98.9 °F (37.2 °C) 96 %      MAP       --         Physical Exam    Nursing note and vitals reviewed.    Appearance:  Patient is in mild distress secondary to pain.  Skin: No rashes seen.  Good turgor.  No abrasions.  No ecchymoses.  Eyes: No conjunctival injection.  Musculoskeletal: Good range of motion all joints.  No deformities.  Diffused tenderness over the anterior aspect of her R knee with swelling. Patient albeit with pain can flex with R knee. Neck supple.  No meningismus.  Neurologic: Motor intact.  Sensation intact.  Cerebellar intact.  Cranial nerves intact.  Mental Status:  Alert and oriented x 3.  Appropriate, conversant.     ED Course   Procedures  Labs Reviewed - No data to display         Imaging Results    None          Medications   HYDROcodone-acetaminophen 5-325 mg per tablet 1 tablet (1 tablet Oral Given 11/1/22 1427)     Medical Decision Making:   History:   Old Medical Records: I decided to obtain old medical records.  ED Management:  Patient presents emergency department with acute flare of degenerative pain of her right knee.  Patient is morbidly obese, and has severe degenerative joint disease of her right knee and has been advised that she needs a total knee replacement.  Patient has been unable to get this scheduled, as she has not followed back up with orthopedist.  Patient cannot take NSAIDs, and is a diabetic, so to control her pain I have given her narcotic hydrocodone.  I did review the prescription drug monitoring labs I prior to this prescription.  I advised her also to ice her knee as much as possible and to be sure she follows up with orthopedist to have definitive care of her total knee replacement.  There are no other signs of a dislocation, or swelling to indicate DVT or even Baker cyst.  Patient was discharged in stable condition verbalized understanding  agreement with the plan.        Scribe Attestation:   Scribe #1: I performed the above scribed service and the documentation accurately describes the services I performed. I attest to the accuracy of the note.                   Clinical Impression:   Final diagnoses:  [M17.11] Primary osteoarthritis of right knee (Primary)  [M25.561] Acute pain of right knee  [E66.9] Obesity, unspecified classification, unspecified obesity type, unspecified whether serious comorbidity present      ED Disposition Condition    Discharge Stable          ED Prescriptions       Medication Sig Dispense Start Date End Date Auth. Provider    HYDROcodone-acetaminophen (NORCO) 5-325 mg per tablet Take 1 tablet by mouth every 8 (eight) hours as needed for Pain. 13 tablet 11/1/2022 -- Kodi Jacob III, MD          Follow-up Information    None          Kodi Jacob III, MD  11/02/22 1523

## 2022-11-01 NOTE — ED TRIAGE NOTES
Patient identifiers for Annika Mac 71 y.o. female checked and correct.  Chief Complaint   Patient presents with    Knee Pain     Pt c/o right knee pain & swelling.  Denies injury     Past Medical History:   Diagnosis Date    Asthma     Chronic obstructive pulmonary disease, unspecified COPD type 1/14/2022    Constipation 10/3/2019    Deep vein thrombophlebitis of left leg 7/27/2012    Deep vein thrombosis     when she had a knee replacement    Diabetic foot ulcer with osteomyelitis     Encounter for blood transfusion     anemic     GERD (gastroesophageal reflux disease)     Obesity, diabetes, and hypertension syndrome 2/13/2019    Obstructive sleep apnea 7/27/2012    PAD (peripheral artery disease) 11/21/2013    Pressure ulcer of toe of left foot     Type 2 diabetes mellitus with obesity 8/19/2020    Type 2 diabetes mellitus with peripheral artery disease 8/19/2020     Allergies reported:   Review of patient's allergies indicates:   Allergen Reactions    Clindamycin Hives and Swelling    Iodinated contrast media Rash         LOC: Patient is awake, alert, and aware of environment with an appropriate affect. Patient is oriented x 4 and speaking appropriately.  APPEARANCE: Patient resting comfortably and in no acute distress. Patient is clean and well groomed, patient's clothing is properly fastened.  SKIN: The skin is warm and dry. Patient has normal skin turgor and moist mucus membranes.   MUSKULOSKELETAL: Patient is moving all extremities well, no obvious deformities noted. Pulses intact. Right knee swelling/pain  RESPIRATORY: Airway is open and patent. Respirations are spontaneous and non-labored with normal effort and rate.  CARDIAC: Patient has a normal rate . No peripheral edema noted.   ABDOMEN: No distention noted. Soft and non-tender upon palpation.  NEUROLOGICAL:  PERRL. Facial expression is symmetrical. Hand grasps are equal bilaterally. Normal sensation in all extremities when touched with  finger.

## 2022-11-08 ENCOUNTER — OFFICE VISIT (OUTPATIENT)
Dept: ORTHOPEDICS | Facility: CLINIC | Age: 72
End: 2022-11-08
Payer: MEDICARE

## 2022-11-08 VITALS
BODY MASS INDEX: 47.02 KG/M2 | SYSTOLIC BLOOD PRESSURE: 118 MMHG | HEIGHT: 65 IN | DIASTOLIC BLOOD PRESSURE: 61 MMHG | WEIGHT: 282.19 LBS

## 2022-11-08 DIAGNOSIS — J45.909 ASTHMA, UNSPECIFIED ASTHMA SEVERITY, UNSPECIFIED WHETHER COMPLICATED, UNSPECIFIED WHETHER PERSISTENT: ICD-10-CM

## 2022-11-08 DIAGNOSIS — N18.32 STAGE 3B CHRONIC KIDNEY DISEASE: ICD-10-CM

## 2022-11-08 DIAGNOSIS — Z96.652 STATUS POST TOTAL LEFT KNEE REPLACEMENT: ICD-10-CM

## 2022-11-08 DIAGNOSIS — M17.11 PRIMARY OSTEOARTHRITIS OF RIGHT KNEE: Primary | ICD-10-CM

## 2022-11-08 DIAGNOSIS — E66.01 MORBID OBESITY: ICD-10-CM

## 2022-11-08 DIAGNOSIS — M17.10 ARTHRITIS OF KNEE: ICD-10-CM

## 2022-11-08 DIAGNOSIS — M54.50 CHRONIC BILATERAL LOW BACK PAIN WITHOUT SCIATICA: ICD-10-CM

## 2022-11-08 DIAGNOSIS — G89.29 CHRONIC BILATERAL LOW BACK PAIN WITHOUT SCIATICA: ICD-10-CM

## 2022-11-08 PROCEDURE — 20610 PR DRAIN/INJECT LARGE JOINT/BURSA: ICD-10-PCS | Mod: RT,S$GLB,, | Performed by: PHYSICIAN ASSISTANT

## 2022-11-08 PROCEDURE — 3051F HG A1C>EQUAL 7.0%<8.0%: CPT | Mod: CPTII,S$GLB,, | Performed by: PHYSICIAN ASSISTANT

## 2022-11-08 PROCEDURE — 3066F NEPHROPATHY DOC TX: CPT | Mod: CPTII,S$GLB,, | Performed by: PHYSICIAN ASSISTANT

## 2022-11-08 PROCEDURE — 1160F RVW MEDS BY RX/DR IN RCRD: CPT | Mod: CPTII,S$GLB,, | Performed by: PHYSICIAN ASSISTANT

## 2022-11-08 PROCEDURE — 3060F PR POS MICROALBUMINURIA RESULT DOCUMENTED/REVIEW: ICD-10-PCS | Mod: CPTII,S$GLB,, | Performed by: PHYSICIAN ASSISTANT

## 2022-11-08 PROCEDURE — 3008F PR BODY MASS INDEX (BMI) DOCUMENTED: ICD-10-PCS | Mod: CPTII,S$GLB,, | Performed by: PHYSICIAN ASSISTANT

## 2022-11-08 PROCEDURE — 1101F PT FALLS ASSESS-DOCD LE1/YR: CPT | Mod: CPTII,S$GLB,, | Performed by: PHYSICIAN ASSISTANT

## 2022-11-08 PROCEDURE — 3078F PR MOST RECENT DIASTOLIC BLOOD PRESSURE < 80 MM HG: ICD-10-PCS | Mod: CPTII,S$GLB,, | Performed by: PHYSICIAN ASSISTANT

## 2022-11-08 PROCEDURE — 1159F MED LIST DOCD IN RCRD: CPT | Mod: CPTII,S$GLB,, | Performed by: PHYSICIAN ASSISTANT

## 2022-11-08 PROCEDURE — 3288F FALL RISK ASSESSMENT DOCD: CPT | Mod: CPTII,S$GLB,, | Performed by: PHYSICIAN ASSISTANT

## 2022-11-08 PROCEDURE — 20610 DRAIN/INJ JOINT/BURSA W/O US: CPT | Mod: RT,S$GLB,, | Performed by: PHYSICIAN ASSISTANT

## 2022-11-08 PROCEDURE — 3078F DIAST BP <80 MM HG: CPT | Mod: CPTII,S$GLB,, | Performed by: PHYSICIAN ASSISTANT

## 2022-11-08 PROCEDURE — 4010F PR ACE/ARB THEARPY RXD/TAKEN: ICD-10-PCS | Mod: CPTII,S$GLB,, | Performed by: PHYSICIAN ASSISTANT

## 2022-11-08 PROCEDURE — 3060F POS MICROALBUMINURIA REV: CPT | Mod: CPTII,S$GLB,, | Performed by: PHYSICIAN ASSISTANT

## 2022-11-08 PROCEDURE — 1159F PR MEDICATION LIST DOCUMENTED IN MEDICAL RECORD: ICD-10-PCS | Mod: CPTII,S$GLB,, | Performed by: PHYSICIAN ASSISTANT

## 2022-11-08 PROCEDURE — 3008F BODY MASS INDEX DOCD: CPT | Mod: CPTII,S$GLB,, | Performed by: PHYSICIAN ASSISTANT

## 2022-11-08 PROCEDURE — 1101F PR PT FALLS ASSESS DOC 0-1 FALLS W/OUT INJ PAST YR: ICD-10-PCS | Mod: CPTII,S$GLB,, | Performed by: PHYSICIAN ASSISTANT

## 2022-11-08 PROCEDURE — 1125F AMNT PAIN NOTED PAIN PRSNT: CPT | Mod: CPTII,S$GLB,, | Performed by: PHYSICIAN ASSISTANT

## 2022-11-08 PROCEDURE — 3074F PR MOST RECENT SYSTOLIC BLOOD PRESSURE < 130 MM HG: ICD-10-PCS | Mod: CPTII,S$GLB,, | Performed by: PHYSICIAN ASSISTANT

## 2022-11-08 PROCEDURE — 3051F PR MOST RECENT HEMOGLOBIN A1C LEVEL 7.0 - < 8.0%: ICD-10-PCS | Mod: CPTII,S$GLB,, | Performed by: PHYSICIAN ASSISTANT

## 2022-11-08 PROCEDURE — 1125F PR PAIN SEVERITY QUANTIFIED, PAIN PRESENT: ICD-10-PCS | Mod: CPTII,S$GLB,, | Performed by: PHYSICIAN ASSISTANT

## 2022-11-08 PROCEDURE — 3074F SYST BP LT 130 MM HG: CPT | Mod: CPTII,S$GLB,, | Performed by: PHYSICIAN ASSISTANT

## 2022-11-08 PROCEDURE — 3288F PR FALLS RISK ASSESSMENT DOCUMENTED: ICD-10-PCS | Mod: CPTII,S$GLB,, | Performed by: PHYSICIAN ASSISTANT

## 2022-11-08 PROCEDURE — 1160F PR REVIEW ALL MEDS BY PRESCRIBER/CLIN PHARMACIST DOCUMENTED: ICD-10-PCS | Mod: CPTII,S$GLB,, | Performed by: PHYSICIAN ASSISTANT

## 2022-11-08 PROCEDURE — 4010F ACE/ARB THERAPY RXD/TAKEN: CPT | Mod: CPTII,S$GLB,, | Performed by: PHYSICIAN ASSISTANT

## 2022-11-08 PROCEDURE — 99214 OFFICE O/P EST MOD 30 MIN: CPT | Mod: 25,S$GLB,, | Performed by: PHYSICIAN ASSISTANT

## 2022-11-08 PROCEDURE — 99999 PR PBB SHADOW E&M-EST. PATIENT-LVL IV: ICD-10-PCS | Mod: PBBFAC,,, | Performed by: PHYSICIAN ASSISTANT

## 2022-11-08 PROCEDURE — 99999 PR PBB SHADOW E&M-EST. PATIENT-LVL IV: CPT | Mod: PBBFAC,,, | Performed by: PHYSICIAN ASSISTANT

## 2022-11-08 PROCEDURE — 99214 PR OFFICE/OUTPT VISIT, EST, LEVL IV, 30-39 MIN: ICD-10-PCS | Mod: 25,S$GLB,, | Performed by: PHYSICIAN ASSISTANT

## 2022-11-08 PROCEDURE — 3066F PR DOCUMENTATION OF TREATMENT FOR NEPHROPATHY: ICD-10-PCS | Mod: CPTII,S$GLB,, | Performed by: PHYSICIAN ASSISTANT

## 2022-11-08 RX ORDER — BETAMETHASONE SODIUM PHOSPHATE AND BETAMETHASONE ACETATE 3; 3 MG/ML; MG/ML
6 INJECTION, SUSPENSION INTRA-ARTICULAR; INTRALESIONAL; INTRAMUSCULAR; SOFT TISSUE
Status: COMPLETED | OUTPATIENT
Start: 2022-11-08 | End: 2022-11-08

## 2022-11-08 RX ADMIN — BETAMETHASONE SODIUM PHOSPHATE AND BETAMETHASONE ACETATE 6 MG: 3; 3 INJECTION, SUSPENSION INTRA-ARTICULAR; INTRALESIONAL; INTRAMUSCULAR; SOFT TISSUE at 04:11

## 2022-11-08 NOTE — PROGRESS NOTES
"  SUBJECTIVE:     Chief Complaint & History of Present Illness:  Annika Mac is a Established patient 71 y.o. female who is seen here today with a complaint of    Chief Complaint   Patient presents with    Right Knee - Pain    .  She has patient well-known to me was last seen treated the clinic 10/14/2022 for her shoulder from which she is doing very well.  Concerns today revolve around continuing pain soreness of the right knee.  She is aware she needs a knee replacement surgery and has made some progress towards losing weight getting her BMI below 45.  With this in mind we going to proceed with cortisone injection to address her immediate pain and schedule her a tentative appointment with Adult Reconstructive surgery in approximately 3-4 weeks to discuss moving forward with knee replacement surgery  On a scale of 1-10, with 10 being worst pain imaginable, he rates this pain as 7 on good days and 9 on bad days.  she describes the pain as sore and grinding.    Review of patient's allergies indicates:   Allergen Reactions    Clindamycin Hives and Swelling    Iodinated contrast media Rash         Current Outpatient Medications   Medication Sig Dispense Refill    albuterol (PROAIR HFA) 90 mcg/actuation inhaler Inhale 2 puffs into the lungs every 6 (six) hours as needed for Wheezing. Rescue 18 g 6    amLODIPine (NORVASC) 10 MG tablet Take 1 tablet (10 mg total) by mouth once daily. 90 tablet 3    aspirin 81 MG Chew Take 1 tablet (81 mg total) by mouth once daily.  0    BD ULTRA-FINE KIKA PEN NEEDLE 32 gauge x 5/32" Ndle To use 4 times daily 200 each 20    blood sugar diagnostic Strp 1 strip 4 times daily. Contour Next. 200 strip 11    blood-glucose meter kit 1 each by Other route once daily. Use daily. Insurance proffered one touch  E11.42 1 each 0    chlorthalidone (HYGROTEN) 25 MG Tab Take 1 tablet (25 mg total) by mouth once daily. 30 tablet 11    dulaglutide (TRULICITY) 3 mg/0.5 mL pen injector Inject 3 mg into the " skin every 7 days. 12 pen 3    DULoxetine (CYMBALTA) 30 MG capsule TAKE 1 CAPSULE BY MOUTH  ONCE DAILY 90 capsule 3    famotidine (PEPCID) 20 MG tablet Take 1 tablet (20 mg total) by mouth 2 (two) times daily. 1 tablet 0    glucagon (GVOKE HYPOPEN 1-PACK) 0.5 mg/0.1 mL AtIn Inject 1 Dose into the skin daily as needed (for severe hypoglycemia if you aren't able to treat by ingesting sugar). 1 Syringe 3    HUMALOG KWIKPEN INSULIN 100 unit/mL pen INJECT SUBCUTANEOUSLY 11  UNITS INTO THE SKIN 3 TIMES DAILY BEFORE MEALS PLUS  SLIDING SCALE - MAX TOTAL  DAILY DOSE 63 UNITS 30 mL 6    HYDROcodone-acetaminophen (NORCO) 5-325 mg per tablet Take 1 tablet by mouth every 8 (eight) hours as needed for Pain. 13 tablet 0    insulin degludec (TRESIBA FLEXTOUCH U-100) 100 unit/mL (3 mL) insulin pen Inject 40 Units into the skin once daily. 15 pen 3    irbesartan (AVAPRO) 300 MG tablet Take 1 tablet (300 mg total) by mouth every evening. 90 tablet 3    labetaloL (NORMODYNE) 300 MG tablet Take 1 tablet (300 mg total) by mouth 2 (two) times daily. 180 tablet 6    lancets 31 gauge Misc 1 lancet by Misc.(Non-Drug; Combo Route) route 4 (four) times daily. E11.42 . Prescribed one touch 200 each 6    multivitamin (THERAGRAN) per tablet Take 1 tablet by mouth once daily.      pregabalin (LYRICA) 150 MG capsule Take 1 capsule (150 mg total) by mouth 2 (two) times daily. 60 capsule 6    simvastatin (ZOCOR) 40 MG tablet Take 1 tablet (40 mg total) by mouth every evening. 90 tablet 3    ammonium lactate 12 % Crea Apply twice daily to affected parts both feet as needed. 140 g 11    colchicine (COLCRYS) 0.6 mg tablet TAKE 1 TABLET EVERY OTHER DAY 45 tablet 3    ferrous sulfate (FEOSOL) Tab tablet Take 1 tablet (1 each total) by mouth once daily. 90 tablet 6    sodium bicarbonate 325 MG tablet Take 2 tablets (650 mg total) by mouth 2 (two) times daily. 120 tablet 11     Current Facility-Administered Medications   Medication Dose Route Frequency  Provider Last Rate Last Admin    betamethasone acetate-betamethasone sodium phosphate injection 6 mg  6 mg Intra-articular 1 time in Clinic/HOD Homero Leggett PA-C         Facility-Administered Medications Ordered in Other Visits   Medication Dose Route Frequency Provider Last Rate Last Admin    fentaNYL injection 25 mcg  25 mcg Intravenous Q5 Min PRN Desmond Fontana MD   50 mcg at 21 0955    midazolam (VERSED) 1 mg/mL injection 0.5 mg  0.5 mg Intravenous PRN Desmond Fontana MD   2 mg at 21 0955       Past Medical History:   Diagnosis Date    Asthma     Chronic obstructive pulmonary disease, unspecified COPD type 2022    Constipation 10/3/2019    Deep vein thrombophlebitis of left leg 2012    Deep vein thrombosis     when she had a knee replacement    Diabetic foot ulcer with osteomyelitis     Encounter for blood transfusion     anemic     GERD (gastroesophageal reflux disease)     Obesity, diabetes, and hypertension syndrome 2019    Obstructive sleep apnea 2012    PAD (peripheral artery disease) 2013    Pressure ulcer of toe of left foot     Type 2 diabetes mellitus with obesity 2020    Type 2 diabetes mellitus with peripheral artery disease 2020       Past Surgical History:   Procedure Laterality Date    CATARACT EXTRACTION W/  INTRAOCULAR LENS IMPLANT Left 3/2002    left     CATARACT EXTRACTION W/  INTRAOCULAR LENS IMPLANT Right     OD dr. olivares     SECTION      COLONOSCOPY N/A 2018    Procedure: COLONOSCOPY;  Surgeon: Faizan Mac MD;  Location: [address]);  Service: Endoscopy;  Laterality: N/A;    CYST REMOVAL  2011    left side of face    CYSTOSCOPY W/ RETROGRADES Left 2020    Procedure: CYSTOSCOPY, WITH RETROGRADE PYELOGRAM;  Surgeon: Noman Rivas MD;  Location: [address];  Service: Urology;  Laterality: Left;  30min    DE QUERVAIN'S RELEASE  2021    Procedure: RELEASE,  HAND, FOR DEQUERVAIN'S TENOSYNOVITIS;  Surgeon: Marky Hagen MD;  Location: [facility] OR;  Service: Orthopedics;;    EYE SURGERY Bilateral 2012    eye implants    GANGLION CYST EXCISION  11/13/2007    Right wrist    INJECTION OF ANESTHETIC AGENT AROUND MEDIAL BRANCH NERVES INNERVATING LUMBAR FACET JOINT Bilateral 4/5/2022    Procedure: Block-nerve-medial branch-lumbar bilateral L4-5 and L5-S1;  Surgeon: Alireza Meraz Jr., MD;  Location: [facility] PAIN MGT;  Service: Pain Management;  Laterality: Bilateral;  pt is diabetic   asa    INJECTION OF ANESTHETIC AGENT AROUND MEDIAL BRANCH NERVES INNERVATING LUMBAR FACET JOINT Bilateral 4/19/2022    Procedure: Block-nerve-medial branch-lumbar bilaterl L4-5 and L5-S1;  Surgeon: Alireza Meraz Jr., MD;  Location: [facility] PAIN MGT;  Service: Pain Management;  Laterality: Bilateral;  pt is diabetic     INJECTION OF FACET JOINT Bilateral 5/6/2021    Procedure: INJECTION, FACET JOINT--Bilateral L4-5, L5-S1;  Surgeon: Alireza Meraz Jr., MD;  Location: [facility] PAIN MGT;  Service: Pain Management;  Laterality: Bilateral;  Oral    INTERPOSITION ARTHROPLASTY OF CARPOMETACARPAL JOINTS Left 1/8/2021    Procedure: INTERPOSITION ARTHROPLASTY, CMC JOINT - left dequervains and cmc arthroplasty, arthrex;  Surgeon: Marky Hagen MD;  Location: [facility] OR;  Service: Orthopedics;  Laterality: Left;    JOINT REPLACEMENT Left     Pan Retinal Photocoagulation Bilateral     Dr. Monterroso (Proliferative Diabetic Retinopathy)    RADIOFREQUENCY THERMOCOAGULATION Right 5/12/2022    Procedure: RADIOFREQUENCY THERMAL COAGULATION RIGHT L4-5, L5-S1 (IV Sedation);  Surgeon: Alireza Meraz Jr., MD;  Location: [facility] PAIN MGT;  Service: Pain Management;  Laterality: Right;  diabetic    RADIOFREQUENCY THERMOCOAGULATION Left 5/19/2022    Procedure: RADIOFREQUENCY THERMAL COAGULATION LEFT L4-5, L5-S1 (IV Sedation);  Surgeon: Alireza Meraz Jr., MD;  Location: [facility] PAIN MGT;  Service: Pain Management;  Laterality:  "Left;  diabetic    TOTAL ABDOMINAL HYSTERECTOMY  1982    TOTAL KNEE ARTHROPLASTY  2/2006    left    TRIGGER FINGER RELEASE  9/18/2009       Vital Signs (Most Recent)  Vitals:    11/08/22 1357   BP: 118/61           Review of Systems:  ROS:  Constitutional: no fever or chills, Morbid obesity BMI 52.09  Eyes: no visual changes, Diabetic retinopathy  ENT: no nasal congestion or sore throat  Respiratory: no cough or shortness of breath, Positive history of asthma  Cardiovascular: no chest pain or palpitations, Positive P 80, CHF, diastolic dysfunction  Gastrointestinal: no nausea or vomiting, tolerating diet, Positive for GERD  Genitourinary: no hematuria or dysuria, Positive CKD stage 4  Integument/Breast: no rash or pruritis  Hematologic/Lymphatic: no easy bruising or lymphadenopathy, Positive for iron deficiency anemia, clotting disorder, long-term use of anticoagulation, history deep vein thrombophlebitis of the left leg  Musculoskeletal: Positive for let induced gout, lumbar facet atrophy, chronic low back pain, osteoarthritis of multiple lower extremities  Neurological: Positive for polyneuropathy secondary to diabetes, degenerative disc disease, history of stroke, lumbar spinal stenosis, cervical myopathy,  Behavioral/Psych: no auditory or visual hallucinations  Endocrine: no heat or cold intolerance, Positive diabetes type 2 poorly controlled with polyneuropathy,  stage 4 kidney disease vitamin-D deficiency hyperglycemia                   OBJECTIVE:     PHYSICAL EXAM:  Height: 5' 5" (165.1 cm) Weight: 128 kg (282 lb 3.2 oz), General Appearance: Well nourished, well developed, in no acute distress.  Neurological: Mood & affect are normal.    right Knee Exam:  Knee Range of Motion:0-90 degrees flexion   Effusion:none  Condition of skin:intact  Location of tenderness:  Globally   Strength:limited by pain and 5 of 5  Stability:  Lachman: stable, LCL: stable, MCL: stable, PCL: stable, and posteromedial (dial): " stable  Varus /Valgus stress:  moderate  Hans:   negative/negative    RADIOGRAPHS:  X-rays from previous visit reviewed by me today demonstrate severe arthritic changes throughout the right knee with complete loss of medial joint space moderate varus deformity    ASSESSMENT/PLAN:       ICD-10-CM ICD-9-CM   1. Primary osteoarthritis of right knee  M17.11 715.16   2. Status post total left knee replacement  Z96.652 V43.65   3. Arthritis of knee  M17.10 716.96   4. Chronic bilateral low back pain without sciatica  M54.50 724.2    G89.29 338.29   5. Asthma, unspecified asthma severity, unspecified whether complicated, unspecified whether persistent  J45.909 493.90   6. Morbid obesity  E66.01 278.01   7. Stage 3b chronic kidney disease  N18.32 585.3       Plan: We discussed with the patient at length all the different treatment options available for  the knee including anti-inflammatories, acetaminophen, rest, ice, knee strengthening exercise, occasional cortisone injections for temporary relief, Viscosupplimentation injections, arthroscopic debridement osteotomy, and finally knee arthroplasty.   Will proceed with cortisone injection of the left knee to alleviate her immediate symptoms schedule an appointment with adult reconstructive surgery discuss moving forward with knee replacement surgery in light of her improvements and weight control     The injection site was identified and the skin was prepared with a betadine solution. The   right  knee was injected with 1 ml of Celestone and 5 ml Lidocaine under sterile technique. Annika Mac tolerated the procedure well, she was advised to rest the knee today, ice and elevation. she did receive immediate relief of the pain in and about his knee she was told this would be short lived and is secondary to the lidocaine. she may have an increase in his discomfort tonight followed by steady improvement over the next several days. I may take 1-3 weeks following the injection  to get the full benefit of the medication.  I will see her back in 3-6 months. Sooner if he has any problems or concerns.    Annika Mac was advised to monitor her blood sugars closely over the next several days. The steroid may cause a rise in them. If her glucose levels rise to a point she is uncomfortable or he is unable to control them is is to contact his PCP or go immediately to the emergency department.

## 2022-11-09 ENCOUNTER — CLINICAL SUPPORT (OUTPATIENT)
Dept: REHABILITATION | Facility: HOSPITAL | Age: 72
End: 2022-11-09
Payer: MEDICARE

## 2022-11-09 DIAGNOSIS — M19.011 PRIMARY OSTEOARTHRITIS OF RIGHT SHOULDER: ICD-10-CM

## 2022-11-09 NOTE — PROGRESS NOTES
[facility]  Physical Therapy Initial Evaluation    Name: Annika Mac  Clinic Number: [id number]    Therapy Diagnosis:   Encounter Diagnosis   Name Primary?    Primary osteoarthritis of right shoulder      Physician: Homero Leggett PA*    Physician Orders: PT Eval and Treat   Medical Diagnosis from Referral:   M17.11 (ICD-10-CM) - Primary osteoarthritis of right knee   M25.561 (ICD-10-CM) - Acute pain of right knee   Evaluation Date: 11/9/2022  Authorization Period Expiration: 10/14/2023  Plan of Care Expiration: 2/9/2023  Visit # / Visits authorized: 1/ 1    PN DUE: 12/9/2022    Time In: 1:30 PM  Time Out: 2:15 PM  Total Billable Time: 45 minutes    Precautions: Standard and Diabetes    Subjective   Date of onset: Patient reports that she fell face forwards in June and this is when she noticed right shoulder and right knee pain. She reports that the right shoulder pain is not as limiting as her right knee pain during her daily tasks. She went to the MD and was told she needed to lose weight in order to have a total knee replacement.     History of current condition - Annika reports: she is limited in her daily activities due to pain in her right knee. She reports increased difficulties with walking activities and is unable to go shopping or spend time with family due to walking limitations. She reports that she has a rollator but has apprehension with use due to falling out of it.     Pain:  Current 5/10, worst 7/10, best 5/10   Location: right knee   Description: sore and grinding  Aggravating Factors:  walking; prolonged standing   Easing Factors: rest    Prior Therapy: yes  Social History: lives with their spouse  Occupation: retired  Prior Level of Function: Independent with functional mobility, house chores, recreational activities, and ADLs  Current Level of Function: modified independent with functional mobility , house chores, ADLs  due to increased pain in right knee. Pt  limited in recreational activities due to impaired walking and standing tolerance     Pts goals: are to be able to walk again so that she can participate in activities with her family     Imaging: X-ray Knee Ortho Bilateral with Flexion(3/25/2022): Right: There is DJD and a varus deformity.  No fracture dislocation bone destruction seen. No OCD seen. Left: There is a TKA in place good alignment no complication.     Medical History:   Past Medical History:   Diagnosis Date    Asthma     Chronic obstructive pulmonary disease, unspecified COPD type 2022    Constipation 10/3/2019    Deep vein thrombophlebitis of left leg 2012    Deep vein thrombosis     when she had a knee replacement    Diabetic foot ulcer with osteomyelitis     Encounter for blood transfusion     anemic     GERD (gastroesophageal reflux disease)     Obesity, diabetes, and hypertension syndrome 2019    Obstructive sleep apnea 2012    PAD (peripheral artery disease) 2013    Pressure ulcer of toe of left foot     Type 2 diabetes mellitus with obesity 2020    Type 2 diabetes mellitus with peripheral artery disease 2020     Surgical History:   Annika ABRAHAM Mac  has a past surgical history that includes Total knee arthroplasty (2006); Total abdominal hysterectomy (); Ganglion cyst excision (2007); Cyst Removal (2011); Trigger finger release (2009);  section; Cataract extraction w/  intraocular lens implant (Left, 3/2002); Cataract extraction w/  intraocular lens implant (Right, ); Pan Retinal Photocoagulation (Bilateral); Eye surgery (Bilateral, ); Colonoscopy (N/A, 2018); Cystoscopy w/ retrogrades (Left, 2020); Joint replacement (Left); Interposition arthroplasty of carpometacarpal joints (Left, 2021); De Quervain's release (2021); Injection of facet joint (Bilateral, 2021); Injection of anesthetic agent around medial branch nerves innervating lumbar facet joint  (Bilateral, 4/5/2022); Injection of anesthetic agent around medial branch nerves innervating lumbar facet joint (Bilateral, 4/19/2022); Radiofrequency thermocoagulation (Right, 5/12/2022); and Radiofrequency thermocoagulation (Left, 5/19/2022).    Medications:   [facility] has a current medication list which includes the following prescription(s): albuterol, amlodipine, ammonium lactate, aspirin, bd ultra-fine myesha pen needle, blood sugar diagnostic, blood-glucose meter, chlorthalidone, colchicine, trulicity, duloxetine, famotidine, ferrous sulfate, gvoke hypopen 1-pack, humalog kwikpen insulin, hydrocodone-acetaminophen, tresiba flextouch u-100, irbesartan, labetalol, lancets, multivitamin, pregabalin, simvastatin, sodium bicarbonate, and [DISCONTINUED] insulin glargine (toujeo), and the following Facility-Administered Medications: fentanyl and midazolam.    Allergies:   Review of patient's allergies indicates:   Allergen Reactions    Clindamycin Hives and Swelling    Iodinated contrast media Rash        Objective   Observation: Pt pleasant and appropriate throughout evaluation; A&O x 3     Sensation: Bilateral lower extremities intact to light touch. Pt denies any numbness or tingling in bilateral lower extremities    GAIT DEVIATIONS: Pt ambulates with cane in right upper extremity. She ambulates with an antalgic gait pattern. She exhibits significant right lateral lean during right stance phase, decreased stance time on right lower extremity, and decreased bilateral step length.      Range of Motion:   Knee Left active Right Active   Flexion 110 degrees 105 degrees   Extension 0 degrees 0 degrees     Lower Extremity Strength   Right LE  Left LE    Knee extension: 4/5 Knee extension: 4+/5   Knee flexion: 3+/5 Knee flexion: 4+/5   Hip flexion: 4/5 Hip flexion: 4+/5   Hip extension:  4+/5 Hip extension: 4+/5   Hip abduction: 4/5 Hip abduction: 4/5   Hip adduction: 4/5 Hip adduction 4/5   Ankle dorsiflexion: 4/5 Ankle  "dorsiflexion: 5/5   Ankle plantarflexion: 4/5 Ankle plantarflexion: 5/5     Special Tests:   Right Left   Valgus Stress Test Positive Negative   Varus Stress test Positive Negative   Lachman's test Negative Negative   Posterior Drawer Negative Negative   Anterior Drawer Negative Negative     30 Second Sit to stand Test: (normal for female 70-75 is 10-15)  - Pt able to perform 4 sit to stand repetitions in 30 seconds without the use of chair arms     Palpation: Pt with much pain and tenderness at right pes anserine, along superior border of patella of right lower extremity, and anteromedial right knee.      Flexibility: Pt exhibits bilaterally limited hamstring and piriformis flexibility     Function: FOTO      TREATMENT   Treatment Time In: 2:10 PM  Treatment Time Out: 2:15 PM  Total Treatment time separate from Evaluation: 5 minutes    Annika received therapeutic exercises to develop strength, endurance, ROM, flexibility, posture, and core stabilization for 5 minutes including:  Quad Set 10 x 10"  Seated hip adduction 10 x 10"  Seated hip abduction RTB x 20  Ankle 4-way RTB x 15  Glute Squeezes x 15    Home Exercises and Patient Education Provided    Education provided:   - - PT POC  - HEP  - PT prognosis and diagnosis  - all questions and concerns answered     Written Home Exercises Provided: yes.  Exercises were reviewed and Annika was able to demonstrate them prior to the end of the session.  Annika demonstrated fair  understanding of the education provided.     See EMR under Patient Instructions for exercises provided  11/9/2022 .    Assessment   Annika is a 71 y.o. female referred to outpatient Physical Therapy with a medical diagnosis of M17.11 (ICD-10-CM) - Primary osteoarthritis of right knee   M25.561 (ICD-10-CM) - Acute pain of right knee .     The patient presents with impairments which include decreased ROM, decreased strength, impaired balance, postural abnormalities, gait abnormalities, and decreased overall " function.  These impairments are limiting patient's ability to independently perform functional mobility, ADLs, and house chores and is limited in her social activity due to impaired walking and standing tolerance due to severity of pain in her right knee. Pt will benefit from performing both land therapy and aquatic therapy to help with overall pain, walking and standing tolerance, and weight loss.     Pt prognosis is Fair due to personal factors and co-morbidities listed below. Pt will benefit from skilled outpatient Physical Therapy to address the deficits stated above and in the chart below, provide pt/family education, and to maximize pt's level of independence.       Plan of care discussed with patient: Yes  Pt's spiritual, cultural and educational needs considered and patient is agreeable to the plan of care and goals as stated below:     Anticipated Barriers for therapy: pain    Medical Necessity is demonstrated by the following  History  Co-morbidities and personal factors that may impact the plan of care Co-morbidities:   See above    Personal Factors:   age     low   Examination  Body Structures and Functions, activity limitations and participation restrictions that may impact the plan of care Body Regions:   lower extremities    Body Systems:    ROM  strength  balance  gait    Participation Restrictions:   Recreational activities  Prolonged standing and walking   Impaired activity tolerance    Activity limitations:   Learning and applying knowledge  no deficits    General Tasks and Commands  no deficits    Communication  no deficits    Mobility  lifting and carrying objects  walking    Self care  washing oneself (bathing, drying, washing hands)  caring for body parts (brushing teeth, shaving, grooming)    Domestic Life  shopping  cooking  doing house work (cleaning house, washing dishes, laundry)    Interactions/Relationships  no deficits    Life Areas  no deficits    Community and Social Life  no  deficits         low   Clinical Presentation stable and uncomplicated low   Decision Making/ Complexity Score: low       GOALS:   Short Term Goals:  5 weeks  Patient will demonstrate independence with HEP in order to progress toward functional independence  Pt will demonstrate improved pain by reports of less than or equal to 7/10 worst pain on the verbal rating scale in order to progress toward maximal functional ability and improve QOL.  Pt will be independent with safety and use of rollator for increased community ambulation and decreased overall energy expenditure with walking activities.   Pt will improve 30 second sit to stand to greater than or equal too 7 repetitions for improved overall functional mobility.      Long Term Goals: 8 weeks  Patient will demonstrate improved function as indicated by a functional limitation score of 47% on the FOTO.  Pt will demonstrate improved pain by reports of less than or equal to 5/10 worst pain on the verbal rating scale in order to progress toward maximal functional ability and improve QOL.  Pt will improve bilateral lower extremity strength to 5/5 for improved strength needed for increased independence with functional mobility.   Pt will improve 30 second sit to stand to greater than or equal too 12 repetitions for improved overall functional mobility.   pt will exhibit equal knee flexion range of motion for improved range needed to perform functional tasks.     Plan   Plan of care Certification: 11/9/2022 to 2/9/2023.    Outpatient Physical Therapy 2 times weekly for 10 weeks to include the following interventions: Aquatic Therapy, Cervical/Lumbar Traction, Electrical Stimulation ;, Gait Training, Manual Therapy, Moist Heat/ Ice, Neuromuscular Re-ed, Orthotic Management and Training, Patient Education, Self Care, Therapeutic Activities, Therapeutic Exercise, and Ultrasound, dry needling and any other therapies and modalities as clinically indicated and appropriate,  including but not limited to FDN and telehealth. Pt may be seen by PTA as a part of pt's care team.         Ashley Pressley, PT  11/9/2022

## 2022-11-10 NOTE — PLAN OF CARE
[facility]  Physical Therapy Initial Evaluation    Name: Annika Mac  Clinic Number: [id number]    Therapy Diagnosis:   Encounter Diagnosis   Name Primary?    Primary osteoarthritis of right shoulder      Physician: Homero Leggett PA*    Physician Orders: PT Eval and Treat   Medical Diagnosis from Referral:   M17.11 (ICD-10-CM) - Primary osteoarthritis of right knee   M25.561 (ICD-10-CM) - Acute pain of right knee   Evaluation Date: 11/9/2022  Authorization Period Expiration: 10/14/2023  Plan of Care Expiration: 2/9/2023  Visit # / Visits authorized: 1/ 1    PN DUE: 12/9/2022    Time In: 1:30 PM  Time Out: 2:15 PM  Total Billable Time: 45 minutes    Precautions: Standard and Diabetes    Subjective   Date of onset: Patient reports that she fell face forwards in June and this is when she noticed right shoulder and right knee pain. She reports that the right shoulder pain is not as limiting as her right knee pain during her daily tasks. She went to the MD and was told she needed to lose weight in order to have a total knee replacement.     History of current condition - Annika reports: she is limited in her daily activities due to pain in her right knee. She reports increased difficulties with walking activities and is unable to go shopping or spend time with family due to walking limitations. She reports that she has a rollator but has apprehension with use due to falling out of it.     Pain:  Current 5/10, worst 7/10, best 5/10   Location: right knee   Description: sore and grinding  Aggravating Factors:  walking; prolonged standing   Easing Factors: rest    Prior Therapy: yes  Social History: lives with their spouse  Occupation: retired  Prior Level of Function: Independent with functional mobility, house chores, recreational activities, and ADLs  Current Level of Function: modified independent with functional mobility , house chores, ADLs  due to increased pain in right knee. Pt  limited in recreational activities due to impaired walking and standing tolerance     Pts goals: are to be able to walk again so that she can participate in activities with her family     Imaging: X-ray Knee Ortho Bilateral with Flexion(3/25/2022): Right: There is DJD and a varus deformity.  No fracture dislocation bone destruction seen. No OCD seen. Left: There is a TKA in place good alignment no complication.     Medical History:   Past Medical History:   Diagnosis Date    Asthma     Chronic obstructive pulmonary disease, unspecified COPD type 2022    Constipation 10/3/2019    Deep vein thrombophlebitis of left leg 2012    Deep vein thrombosis     when she had a knee replacement    Diabetic foot ulcer with osteomyelitis     Encounter for blood transfusion     anemic     GERD (gastroesophageal reflux disease)     Obesity, diabetes, and hypertension syndrome 2019    Obstructive sleep apnea 2012    PAD (peripheral artery disease) 2013    Pressure ulcer of toe of left foot     Type 2 diabetes mellitus with obesity 2020    Type 2 diabetes mellitus with peripheral artery disease 2020     Surgical History:   Annika ABRAHAM Mac  has a past surgical history that includes Total knee arthroplasty (2006); Total abdominal hysterectomy (); Ganglion cyst excision (2007); Cyst Removal (2011); Trigger finger release (2009);  section; Cataract extraction w/  intraocular lens implant (Left, 3/2002); Cataract extraction w/  intraocular lens implant (Right, ); Pan Retinal Photocoagulation (Bilateral); Eye surgery (Bilateral, ); Colonoscopy (N/A, 2018); Cystoscopy w/ retrogrades (Left, 2020); Joint replacement (Left); Interposition arthroplasty of carpometacarpal joints (Left, 2021); De Quervain's release (2021); Injection of facet joint (Bilateral, 2021); Injection of anesthetic agent around medial branch nerves innervating lumbar facet joint  (Bilateral, 4/5/2022); Injection of anesthetic agent around medial branch nerves innervating lumbar facet joint (Bilateral, 4/19/2022); Radiofrequency thermocoagulation (Right, 5/12/2022); and Radiofrequency thermocoagulation (Left, 5/19/2022).    Medications:   [facility] has a current medication list which includes the following prescription(s): albuterol, amlodipine, ammonium lactate, aspirin, bd ultra-fine myesha pen needle, blood sugar diagnostic, blood-glucose meter, chlorthalidone, colchicine, trulicity, duloxetine, famotidine, ferrous sulfate, gvoke hypopen 1-pack, humalog kwikpen insulin, hydrocodone-acetaminophen, tresiba flextouch u-100, irbesartan, labetalol, lancets, multivitamin, pregabalin, simvastatin, sodium bicarbonate, and [DISCONTINUED] insulin glargine (toujeo), and the following Facility-Administered Medications: fentanyl and midazolam.    Allergies:   Review of patient's allergies indicates:   Allergen Reactions    Clindamycin Hives and Swelling    Iodinated contrast media Rash        Objective   Observation: Pt pleasant and appropriate throughout evaluation; A&O x 3     Sensation: Bilateral lower extremities intact to light touch. Pt denies any numbness or tingling in bilateral lower extremities    GAIT DEVIATIONS: Pt ambulates with cane in right upper extremity. She ambulates with an antalgic gait pattern. She exhibits significant right lateral lean during right stance phase, decreased stance time on right lower extremity, and decreased bilateral step length.      Range of Motion:   Knee Left active Right Active   Flexion 110 degrees 105 degrees   Extension 0 degrees 0 degrees     Lower Extremity Strength   Right LE  Left LE    Knee extension: 4/5 Knee extension: 4+/5   Knee flexion: 3+/5 Knee flexion: 4+/5   Hip flexion: 4/5 Hip flexion: 4+/5   Hip extension:  4+/5 Hip extension: 4+/5   Hip abduction: 4/5 Hip abduction: 4/5   Hip adduction: 4/5 Hip adduction 4/5   Ankle dorsiflexion: 4/5 Ankle  "dorsiflexion: 5/5   Ankle plantarflexion: 4/5 Ankle plantarflexion: 5/5     Special Tests:   Right Left   Valgus Stress Test Positive Negative   Varus Stress test Positive Negative   Lachman's test Negative Negative   Posterior Drawer Negative Negative   Anterior Drawer Negative Negative     30 Second Sit to stand Test: (normal for female 70-75 is 10-15)  - Pt able to perform 4 sit to stand repetitions in 30 seconds without the use of chair arms     Palpation: Pt with much pain and tenderness at right pes anserine, along superior border of patella of right lower extremity, and anteromedial right knee.      Flexibility: Pt exhibits bilaterally limited hamstring and piriformis flexibility     Function: FOTO      TREATMENT   Treatment Time In: 2:10 PM  Treatment Time Out: 2:15 PM  Total Treatment time separate from Evaluation: 5 minutes    Annika received therapeutic exercises to develop strength, endurance, ROM, flexibility, posture, and core stabilization for 5 minutes including:  Quad Set 10 x 10"  Seated hip adduction 10 x 10"  Seated hip abduction RTB x 20  Ankle 4-way RTB x 15  Glute Squeezes x 15    Home Exercises and Patient Education Provided    Education provided:   - - PT POC  - HEP  - PT prognosis and diagnosis  - all questions and concerns answered     Written Home Exercises Provided: yes.  Exercises were reviewed and Annika was able to demonstrate them prior to the end of the session.  Annika demonstrated fair  understanding of the education provided.     See EMR under Patient Instructions for exercises provided 11/9/2022.    Assessment   Annika is a 71 y.o. female referred to outpatient Physical Therapy with a medical diagnosis of M17.11 (ICD-10-CM) - Primary osteoarthritis of right knee   M25.561 (ICD-10-CM) - Acute pain of right knee .     The patient presents with impairments which include decreased ROM, decreased strength, impaired balance, postural abnormalities, gait abnormalities, and decreased overall " function.  These impairments are limiting patient's ability to independently perform functional mobility, ADLs, and house chores and is limited in her social activity due to impaired walking and standing tolerance due to severity of pain in her right knee. Pt will benefit from performing both land therapy and aquatic therapy to help with overall pain, walking and standing tolerance, and weight loss.     Pt prognosis is Fair due to personal factors and co-morbidities listed below. Pt will benefit from skilled outpatient Physical Therapy to address the deficits stated above and in the chart below, provide pt/family education, and to maximize pt's level of independence.       Plan of care discussed with patient: Yes  Pt's spiritual, cultural and educational needs considered and patient is agreeable to the plan of care and goals as stated below:     Anticipated Barriers for therapy: pain    Medical Necessity is demonstrated by the following  History  Co-morbidities and personal factors that may impact the plan of care Co-morbidities:   See above    Personal Factors:   age     low   Examination  Body Structures and Functions, activity limitations and participation restrictions that may impact the plan of care Body Regions:   lower extremities    Body Systems:    ROM  strength  balance  gait    Participation Restrictions:   Recreational activities  Prolonged standing and walking   Impaired activity tolerance    Activity limitations:   Learning and applying knowledge  no deficits    General Tasks and Commands  no deficits    Communication  no deficits    Mobility  lifting and carrying objects  walking    Self care  washing oneself (bathing, drying, washing hands)  caring for body parts (brushing teeth, shaving, grooming)    Domestic Life  shopping  cooking  doing house work (cleaning house, washing dishes, laundry)    Interactions/Relationships  no deficits    Life Areas  no deficits    Community and Social Life  no  deficits         low   Clinical Presentation stable and uncomplicated low   Decision Making/ Complexity Score: low       GOALS:   Short Term Goals:  5 weeks  Patient will demonstrate independence with HEP in order to progress toward functional independence  Pt will demonstrate improved pain by reports of less than or equal to 7/10 worst pain on the verbal rating scale in order to progress toward maximal functional ability and improve QOL.  Pt will be independent with safety and use of rollator for increased community ambulation and decreased overall energy expenditure with walking activities.   Pt will improve 30 second sit to stand to greater than or equal too 7 repetitions for improved overall functional mobility.      Long Term Goals: 8 weeks  Patient will demonstrate improved function as indicated by a functional limitation score of 47% on the FOTO.  Pt will demonstrate improved pain by reports of less than or equal to 5/10 worst pain on the verbal rating scale in order to progress toward maximal functional ability and improve QOL.  Pt will improve bilateral lower extremity strength to 5/5 for improved strength needed for increased independence with functional mobility.   Pt will improve 30 second sit to stand to greater than or equal too 12 repetitions for improved overall functional mobility.   pt will exhibit equal knee flexion range of motion for improved range needed to perform functional tasks.     Plan   Plan of care Certification: 11/9/2022 to 2/9/2023.    Outpatient Physical Therapy 2 times weekly for 10 weeks to include the following interventions: Aquatic Therapy, Cervical/Lumbar Traction, Electrical Stimulation ;, Gait Training, Manual Therapy, Moist Heat/ Ice, Neuromuscular Re-ed, Orthotic Management and Training, Patient Education, Self Care, Therapeutic Activities, Therapeutic Exercise, and Ultrasound, dry needling and any other therapies and modalities as clinically indicated and appropriate,  including but not limited to FDN and telehealth. Pt may be seen by PTA as a part of pt's care team.     Ashley Pressley, PT  11/9/2022

## 2022-11-16 ENCOUNTER — CLINICAL SUPPORT (OUTPATIENT)
Dept: REHABILITATION | Facility: HOSPITAL | Age: 72
End: 2022-11-16
Payer: MEDICARE

## 2022-11-16 DIAGNOSIS — R29.898 DECREASED STRENGTH OF LOWER EXTREMITY: Primary | ICD-10-CM

## 2022-11-16 DIAGNOSIS — R26.2 DIFFICULTY WALKING: ICD-10-CM

## 2022-11-16 DIAGNOSIS — M25.561 CHRONIC PAIN OF RIGHT KNEE: ICD-10-CM

## 2022-11-16 DIAGNOSIS — G89.29 CHRONIC PAIN OF RIGHT KNEE: ICD-10-CM

## 2022-11-16 PROCEDURE — 97112 NEUROMUSCULAR REEDUCATION: CPT | Mod: PN

## 2022-11-16 PROCEDURE — 97110 THERAPEUTIC EXERCISES: CPT | Mod: PN

## 2022-11-16 NOTE — PROGRESS NOTES
[facility]   Physical Therapy Treatment Note     Name: Annika Mac  Clinic Number: [id number]    Therapy Diagnosis:   Encounter Diagnoses   Name Primary?    Difficulty walking     Decreased strength of lower extremity Yes    Chronic pain of right knee      Physician: Homero Leggett PA*    Visit Date: 11/16/2022    Physician Orders: PT Eval and Treat   Medical Diagnosis from Referral:   M17.11 (ICD-10-CM) - Primary osteoarthritis of right knee   M25.561 (ICD-10-CM) - Acute pain of right knee   Evaluation Date: 11/9/2022  Authorization Period Expiration: 12/31/2022  Plan of Care Expiration: 2/9/2023  Visit # / Visits authorized: 1/11 (+1 eval)    Time In: 10:50am  Time Out: 11:35am  Total Billable Time: 40 minutes    Precautions: Standard and Diabetes    SUBJECTIVE     Pt reports: Her knee feels real achy today.  She was compliant with home exercise program.  Response to previous treatment: good  Functional change: none noted    Pain:  Current 5/10, worst 7/10, best 5/10   Location: right knee     OBJECTIVE     Objective Measures updated at progress report unless specified.     TREATMENT     Annika received the treatments listed below:      Annika received therapeutic exercises to develop strength, endurance, ROM, flexibility, posture, and core stabilization for 25 minutes including:  NuStep 5 minutes  Ball Squeezes [id number]  Hip Abduction red band 3x10  Long Arc Quads 3x10  Hamstring Stretch on stool seated [id number]  Gastroc Stretch [id number] with strap    Annika received the following manual therapy techniques: Joint mobilizations, Myofacial release, and Soft tissue Mobilization were applied to the: right knee for 0 minutes, including:    Annika participated in neuromuscular re-education activities to improve: Balance, Coordination, Proprioception, and Posture for 15 minutes. The following activities were included:  Heel Raises 3x10  Hamstring Curls 3x10  Step Taps 4inch 3x10    Patient  Education and Home Exercises     Education provided:   - - PT POC  - HEP  - PT prognosis and diagnosis  - all questions and concerns answered      Written Home Exercises Provided: yes.  Exercises were reviewed and Annika was able to demonstrate them prior to the end of the session.  Annika demonstrated fair  understanding of the education provided.      See EMR under Patient Instructions for exercises provided 11/9/2022.    ASSESSMENT     New movements were added today to work on muscle control, strengthening, and stretching. Patient had increased tension and tenderness in the medial knee and along the adductors. Overall, she tolerated the session well. She did have increased fatigue with standing movements and require a rest break to sit between each movement.      Annika Is progressing well towards her goals.   Pt prognosis is Fair.     Pt will continue to benefit from skilled outpatient physical therapy to address the deficits listed in the problem list box on initial evaluation, provide pt/family education and to maximize pt's level of independence in the home and community environment.     Pt's spiritual, cultural and educational needs considered and pt agreeable to plan of care and goals.     Anticipated barriers to physical therapy: pain    GOALS:   Short Term Goals:  5 weeks  Patient will demonstrate independence with HEP in order to progress toward functional independence  Pt will demonstrate improved pain by reports of less than or equal to 7/10 worst pain on the verbal rating scale in order to progress toward maximal functional ability and improve QOL.  Pt will be independent with safety and use of rollator for increased community ambulation and decreased overall energy expenditure with walking activities.   Pt will improve 30 second sit to stand to greater than or equal too 7 repetitions for improved overall functional mobility.      Long Term Goals: 8 weeks  Patient will demonstrate improved function as  indicated by a functional limitation score of 47% on the FOTO.  Pt will demonstrate improved pain by reports of less than or equal to 5/10 worst pain on the verbal rating scale in order to progress toward maximal functional ability and improve QOL.  Pt will improve bilateral lower extremity strength to 5/5 for improved strength needed for increased independence with functional mobility.   Pt will improve 30 second sit to stand to greater than or equal too 12 repetitions for improved overall functional mobility.   pt will exhibit equal knee flexion range of motion for improved range needed to perform functional tasks.     PLAN     Continue with physical therapy as planned.     Plan of care Certification: 11/9/2022 to 2/9/2023.    Jong Velasquez, PT, DPT

## 2022-11-21 ENCOUNTER — CLINICAL SUPPORT (OUTPATIENT)
Dept: REHABILITATION | Facility: HOSPITAL | Age: 72
End: 2022-11-21
Payer: MEDICARE

## 2022-11-21 DIAGNOSIS — M25.561 CHRONIC PAIN OF RIGHT KNEE: ICD-10-CM

## 2022-11-21 DIAGNOSIS — R29.898 DECREASED STRENGTH OF LOWER EXTREMITY: Primary | ICD-10-CM

## 2022-11-21 DIAGNOSIS — G89.29 CHRONIC PAIN OF RIGHT KNEE: ICD-10-CM

## 2022-11-21 PROCEDURE — 97110 THERAPEUTIC EXERCISES: CPT | Mod: PN

## 2022-11-21 PROCEDURE — 97112 NEUROMUSCULAR REEDUCATION: CPT | Mod: PN

## 2022-11-21 NOTE — PROGRESS NOTES
[facility]   Physical Therapy Treatment Note     Name: Annika Mac  Clinic Number: [id number]    Therapy Diagnosis:   Encounter Diagnoses   Name Primary?    Decreased strength of lower extremity Yes    Chronic pain of right knee      Physician: Homero Leggett PA*    Visit Date: 11/21/2022    Physician Orders: PT Eval and Treat   Medical Diagnosis from Referral:   M17.11 (ICD-10-CM) - Primary osteoarthritis of right knee   M25.561 (ICD-10-CM) - Acute pain of right knee   Evaluation Date: 11/9/2022  Authorization Period Expiration: 12/31/2022  Plan of Care Expiration: 2/9/2023  Visit # / Visits authorized: 2/11 (+1 eval)    Time In: 1:45pm  Time Out: 2:30pm  Total Billable Time: 40 minutes    Precautions: Standard and Diabetes    SUBJECTIVE     Pt reports: She is feeling alright today. She states she would like to do the pool once a week at the [facility] if we can get it set up.    She was compliant with home exercise program.  Response to previous treatment: good  Functional change: none noted    Pain:  Current 5/10, worst 7/10, best 5/10   Location: right knee     OBJECTIVE     Objective Measures updated at progress report unless specified.     TREATMENT     Annika received the treatments listed below:      Annika received therapeutic exercises to develop strength, endurance, ROM, flexibility, posture, and core stabilization for 25 minutes including:  NuStep 5 minutes  Ball Squeezes [id number]  Hip Abduction red band 3x10  Long Arc Quads 3x10  Hamstring Stretch on stool seated [id number]  Gastroc Stretch [id number] with strap    Annika received the following manual therapy techniques: Joint mobilizations, Myofacial release, and Soft tissue Mobilization were applied to the: right knee for 0 minutes, including:    Annika participated in neuromuscular re-education activities to improve: Balance, Coordination, Proprioception, and Posture for 15 minutes. The following activities were included:  Heel Raises  3x10  Hamstring Curls 3x10  Step Taps 4inch 3x10    Patient Education and Home Exercises     Education provided:   - - PT POC  - HEP  - PT prognosis and diagnosis  - all questions and concerns answered      Written Home Exercises Provided: yes.  Exercises were reviewed and Annika was able to demonstrate them prior to the end of the session.  Annika demonstrated fair  understanding of the education provided.      See EMR under Patient Instructions for exercises provided 11/9/2022.    ASSESSMENT     Patient is only able to tolerated a few standing movements due to fatigue and knee discomfort. Fatigue is a big limiter along with pain in progressions and how long she can tolerated standing. Overall, she tolerated session well.     Annika Is progressing well towards her goals.   Pt prognosis is Fair.     Pt will continue to benefit from skilled outpatient physical therapy to address the deficits listed in the problem list box on initial evaluation, provide pt/family education and to maximize pt's level of independence in the home and community environment.     Pt's spiritual, cultural and educational needs considered and pt agreeable to plan of care and goals.     Anticipated barriers to physical therapy: pain    GOALS:   Short Term Goals:  5 weeks  Patient will demonstrate independence with HEP in order to progress toward functional independence  Pt will demonstrate improved pain by reports of less than or equal to 7/10 worst pain on the verbal rating scale in order to progress toward maximal functional ability and improve QOL.  Pt will be independent with safety and use of rollator for increased community ambulation and decreased overall energy expenditure with walking activities.   Pt will improve 30 second sit to stand to greater than or equal too 7 repetitions for improved overall functional mobility.      Long Term Goals: 8 weeks  Patient will demonstrate improved function as indicated by a functional limitation score of  47% on the FOTO.  Pt will demonstrate improved pain by reports of less than or equal to 5/10 worst pain on the verbal rating scale in order to progress toward maximal functional ability and improve QOL.  Pt will improve bilateral lower extremity strength to 5/5 for improved strength needed for increased independence with functional mobility.   Pt will improve 30 second sit to stand to greater than or equal too 12 repetitions for improved overall functional mobility.   pt will exhibit equal knee flexion range of motion for improved range needed to perform functional tasks.     PLAN     Continue with physical therapy as planned.     Plan of care Certification: 11/9/2022 to 2/9/2023.    Jong Velasquez, PT, DPT

## 2022-11-23 ENCOUNTER — OFFICE VISIT (OUTPATIENT)
Dept: PODIATRY | Facility: CLINIC | Age: 72
End: 2022-11-23
Payer: MEDICARE

## 2022-11-23 DIAGNOSIS — E11.42 DIABETIC POLYNEUROPATHY ASSOCIATED WITH TYPE 2 DIABETES MELLITUS: Primary | ICD-10-CM

## 2022-11-23 DIAGNOSIS — B35.1 ONYCHOMYCOSIS DUE TO DERMATOPHYTE: ICD-10-CM

## 2022-11-23 PROCEDURE — 99499 UNLISTED E&M SERVICE: CPT | Mod: S$GLB,,, | Performed by: PODIATRIST

## 2022-11-23 PROCEDURE — 4010F PR ACE/ARB THEARPY RXD/TAKEN: ICD-10-PCS | Mod: CPTII,S$GLB,, | Performed by: PODIATRIST

## 2022-11-23 PROCEDURE — 3051F HG A1C>EQUAL 7.0%<8.0%: CPT | Mod: CPTII,S$GLB,, | Performed by: PODIATRIST

## 2022-11-23 PROCEDURE — 3066F NEPHROPATHY DOC TX: CPT | Mod: CPTII,S$GLB,, | Performed by: PODIATRIST

## 2022-11-23 PROCEDURE — 3060F POS MICROALBUMINURIA REV: CPT | Mod: CPTII,S$GLB,, | Performed by: PODIATRIST

## 2022-11-23 PROCEDURE — 99499 NO LOS: ICD-10-PCS | Mod: S$GLB,,, | Performed by: PODIATRIST

## 2022-11-23 PROCEDURE — 11721 PR DEBRIDEMENT OF NAILS, 6 OR MORE: ICD-10-PCS | Mod: Q9,S$GLB,, | Performed by: PODIATRIST

## 2022-11-23 PROCEDURE — 4010F ACE/ARB THERAPY RXD/TAKEN: CPT | Mod: CPTII,S$GLB,, | Performed by: PODIATRIST

## 2022-11-23 PROCEDURE — 99999 PR PBB SHADOW E&M-EST. PATIENT-LVL I: ICD-10-PCS | Mod: PBBFAC,,, | Performed by: PODIATRIST

## 2022-11-23 PROCEDURE — 3051F PR MOST RECENT HEMOGLOBIN A1C LEVEL 7.0 - < 8.0%: ICD-10-PCS | Mod: CPTII,S$GLB,, | Performed by: PODIATRIST

## 2022-11-23 PROCEDURE — 99999 PR PBB SHADOW E&M-EST. PATIENT-LVL I: CPT | Mod: PBBFAC,,, | Performed by: PODIATRIST

## 2022-11-23 PROCEDURE — 3066F PR DOCUMENTATION OF TREATMENT FOR NEPHROPATHY: ICD-10-PCS | Mod: CPTII,S$GLB,, | Performed by: PODIATRIST

## 2022-11-23 PROCEDURE — 3060F PR POS MICROALBUMINURIA RESULT DOCUMENTED/REVIEW: ICD-10-PCS | Mod: CPTII,S$GLB,, | Performed by: PODIATRIST

## 2022-11-23 PROCEDURE — 11721 DEBRIDE NAIL 6 OR MORE: CPT | Mod: Q9,S$GLB,, | Performed by: PODIATRIST

## 2022-11-23 RX ORDER — MUPIROCIN 20 MG/G
OINTMENT TOPICAL 2 TIMES DAILY
COMMUNITY
Start: 2022-11-15 | End: 2023-04-18

## 2022-11-23 RX ORDER — ALLOPURINOL 300 MG/1
300 TABLET ORAL DAILY
COMMUNITY
Start: 2022-10-16 | End: 2023-04-20

## 2022-11-28 ENCOUNTER — CLINICAL SUPPORT (OUTPATIENT)
Dept: REHABILITATION | Facility: HOSPITAL | Age: 72
End: 2022-11-28
Payer: MEDICARE

## 2022-11-28 DIAGNOSIS — M25.561 CHRONIC PAIN OF RIGHT KNEE: ICD-10-CM

## 2022-11-28 DIAGNOSIS — G89.29 CHRONIC PAIN OF RIGHT KNEE: ICD-10-CM

## 2022-11-28 DIAGNOSIS — R29.898 DECREASED STRENGTH OF LOWER EXTREMITY: Primary | ICD-10-CM

## 2022-11-28 PROCEDURE — 97110 THERAPEUTIC EXERCISES: CPT | Mod: PN

## 2022-11-28 NOTE — PROGRESS NOTES
[facility]   Physical Therapy Treatment Note     Name: Annika Mac  Clinic Number: [id number]    Therapy Diagnosis:   Encounter Diagnoses   Name Primary?    Decreased strength of lower extremity Yes    Chronic pain of right knee        Physician: Homero Leggett PA*    Visit Date: 11/28/2022    Physician Orders: PT Eval and Treat   Medical Diagnosis from Referral:   M17.11 (ICD-10-CM) - Primary osteoarthritis of right knee   M25.561 (ICD-10-CM) - Acute pain of right knee   Evaluation Date: 11/9/2022  Authorization Period Expiration: 12/31/2022  Plan of Care Expiration: 2/9/2023  Visit # / Visits authorized: 3/11 (+1 eval)    Time In: 12:45pm  Time Out: 1:30pm  Total Billable Time: 45 minutes    Precautions: Standard and Diabetes    SUBJECTIVE   Pt reports: she fell on thanksgiving trying to sit in her rolling walker. She fell on her left hip and states that she is still sore, but was able to get off of the floor without help.     She was compliant with home exercise program.  Response to previous treatment: good  Functional change: none noted    Pain:  Current 5/10, worst 7/10, best 5/10   Location: right knee     OBJECTIVE     Objective Measures updated at progress report unless specified.     TREATMENT     Annika received the treatments listed below:      Annika received therapeutic exercises to develop strength, endurance, ROM, flexibility, posture, and core stabilization for 40 minutes including:  NuStep 5 minutes  Ball Squeezes [id number]  Hip Abduction red band 3x10  Long Arc Quads 3x10  Hamstring Stretch on stool seated [id number]  Gastroc Stretch [id number] with strap  +Heel raises 2 x 10  +Step ups on 4 inch step x 10  +seated hamstring curl RTB 2 x 10  +Seated ankle DF RTB 2 x 10  +Sit to stands x10     Annika received the following manual therapy techniques: Joint mobilizations, Myofacial release, and Soft tissue Mobilization were applied to the: right knee for 0 minutes,  including:    Annika participated in neuromuscular re-education activities to improve: Balance, Coordination, Proprioception, and Posture for 5 minutes. The following activities were included:  Step Taps 4inch 3x10    Patient Education and Home Exercises     Education provided:   - - PT POC  - HEP  - PT prognosis and diagnosis  - all questions and concerns answered      Written Home Exercises Provided: yes.  Exercises were reviewed and Annika was able to demonstrate them prior to the end of the session.  Annika demonstrated fair  understanding of the education provided.      See EMR under Patient Instructions for exercises provided 11/9/2022.    ASSESSMENT   Pt tolerated therapy session well today. She fell on Thanksgiving on her left hip and states she is still sore. Added seated strengthening exercises with good tolerance. She was able to tolerate increased standing activities with less fatigue. She exhibits most difficulty with step ups on 6 inch step with increased knee pain and required seated rest breaks. Continue to work on progressing standing tolerance and overall endurance.     Annika Is progressing well towards her goals.   Pt prognosis is Fair.     Pt will continue to benefit from skilled outpatient physical therapy to address the deficits listed in the problem list box on initial evaluation, provide pt/family education and to maximize pt's level of independence in the home and community environment.     Pt's spiritual, cultural and educational needs considered and pt agreeable to plan of care and goals.     Anticipated barriers to physical therapy: pain    GOALS:   Short Term Goals:  5 weeks  Patient will demonstrate independence with HEP in order to progress toward functional independence  Pt will demonstrate improved pain by reports of less than or equal to 7/10 worst pain on the verbal rating scale in order to progress toward maximal functional ability and improve QOL.  Pt will be independent with safety and  use of rollator for increased community ambulation and decreased overall energy expenditure with walking activities.   Pt will improve 30 second sit to stand to greater than or equal too 7 repetitions for improved overall functional mobility.      Long Term Goals: 8 weeks  Patient will demonstrate improved function as indicated by a functional limitation score of 47% on the FOTO.  Pt will demonstrate improved pain by reports of less than or equal to 5/10 worst pain on the verbal rating scale in order to progress toward maximal functional ability and improve QOL.  Pt will improve bilateral lower extremity strength to 5/5 for improved strength needed for increased independence with functional mobility.   Pt will improve 30 second sit to stand to greater than or equal too 12 repetitions for improved overall functional mobility.   pt will exhibit equal knee flexion range of motion for improved range needed to perform functional tasks.     PLAN     Continue with physical therapy as planned.     Plan of care Certification: 11/9/2022 to 2/9/2023.    Ashley Pressley, PT, DPT

## 2022-11-30 ENCOUNTER — OFFICE VISIT (OUTPATIENT)
Dept: CARDIOLOGY | Facility: CLINIC | Age: 72
End: 2022-11-30
Payer: MEDICARE

## 2022-11-30 ENCOUNTER — TELEPHONE (OUTPATIENT)
Dept: DERMATOLOGY | Facility: CLINIC | Age: 72
End: 2022-11-30
Payer: MEDICARE

## 2022-11-30 VITALS
DIASTOLIC BLOOD PRESSURE: 60 MMHG | HEART RATE: 78 BPM | HEIGHT: 65 IN | SYSTOLIC BLOOD PRESSURE: 121 MMHG | WEIGHT: 291.69 LBS | BODY MASS INDEX: 48.6 KG/M2 | OXYGEN SATURATION: 96 %

## 2022-11-30 DIAGNOSIS — E66.01 MORBID OBESITY: ICD-10-CM

## 2022-11-30 DIAGNOSIS — L65.9 HAIR LOSS: Primary | ICD-10-CM

## 2022-11-30 DIAGNOSIS — I10 ESSENTIAL HYPERTENSION: ICD-10-CM

## 2022-11-30 DIAGNOSIS — E11.42 TYPE 2 DIABETES MELLITUS WITH DIABETIC POLYNEUROPATHY, WITH LONG-TERM CURRENT USE OF INSULIN: ICD-10-CM

## 2022-11-30 DIAGNOSIS — I12.9 TYPE 2 DIABETES MELLITUS WITH STAGE 3B CHRONIC KIDNEY DISEASE AND HYPERTENSION: ICD-10-CM

## 2022-11-30 DIAGNOSIS — I51.89 DIASTOLIC DYSFUNCTION: Chronic | ICD-10-CM

## 2022-11-30 DIAGNOSIS — Z79.4 TYPE 2 DIABETES MELLITUS WITH DIABETIC POLYNEUROPATHY, WITH LONG-TERM CURRENT USE OF INSULIN: ICD-10-CM

## 2022-11-30 DIAGNOSIS — Z86.73 HISTORY OF STROKE: ICD-10-CM

## 2022-11-30 DIAGNOSIS — E11.69 DYSLIPIDEMIA ASSOCIATED WITH TYPE 2 DIABETES MELLITUS: ICD-10-CM

## 2022-11-30 DIAGNOSIS — N18.32 TYPE 2 DIABETES MELLITUS WITH STAGE 3B CHRONIC KIDNEY DISEASE AND HYPERTENSION: ICD-10-CM

## 2022-11-30 DIAGNOSIS — E11.22 TYPE 2 DIABETES MELLITUS WITH STAGE 3B CHRONIC KIDNEY DISEASE AND HYPERTENSION: ICD-10-CM

## 2022-11-30 DIAGNOSIS — E78.5 DYSLIPIDEMIA ASSOCIATED WITH TYPE 2 DIABETES MELLITUS: ICD-10-CM

## 2022-11-30 PROCEDURE — 1159F MED LIST DOCD IN RCRD: CPT | Mod: CPTII,GC,S$GLB, | Performed by: INTERNAL MEDICINE

## 2022-11-30 PROCEDURE — 99214 PR OFFICE/OUTPT VISIT, EST, LEVL IV, 30-39 MIN: ICD-10-PCS | Mod: GC,S$GLB,, | Performed by: INTERNAL MEDICINE

## 2022-11-30 PROCEDURE — 99214 OFFICE O/P EST MOD 30 MIN: CPT | Mod: GC,S$GLB,, | Performed by: INTERNAL MEDICINE

## 2022-11-30 PROCEDURE — 3288F PR FALLS RISK ASSESSMENT DOCUMENTED: ICD-10-PCS | Mod: CPTII,GC,S$GLB, | Performed by: INTERNAL MEDICINE

## 2022-11-30 PROCEDURE — 3008F PR BODY MASS INDEX (BMI) DOCUMENTED: ICD-10-PCS | Mod: CPTII,GC,S$GLB, | Performed by: INTERNAL MEDICINE

## 2022-11-30 PROCEDURE — 3074F SYST BP LT 130 MM HG: CPT | Mod: CPTII,GC,S$GLB, | Performed by: INTERNAL MEDICINE

## 2022-11-30 PROCEDURE — 3078F DIAST BP <80 MM HG: CPT | Mod: CPTII,GC,S$GLB, | Performed by: INTERNAL MEDICINE

## 2022-11-30 PROCEDURE — 4010F ACE/ARB THERAPY RXD/TAKEN: CPT | Mod: CPTII,GC,S$GLB, | Performed by: INTERNAL MEDICINE

## 2022-11-30 PROCEDURE — 3066F PR DOCUMENTATION OF TREATMENT FOR NEPHROPATHY: ICD-10-PCS | Mod: CPTII,GC,S$GLB, | Performed by: INTERNAL MEDICINE

## 2022-11-30 PROCEDURE — 3074F PR MOST RECENT SYSTOLIC BLOOD PRESSURE < 130 MM HG: ICD-10-PCS | Mod: CPTII,GC,S$GLB, | Performed by: INTERNAL MEDICINE

## 2022-11-30 PROCEDURE — 3060F PR POS MICROALBUMINURIA RESULT DOCUMENTED/REVIEW: ICD-10-PCS | Mod: CPTII,GC,S$GLB, | Performed by: INTERNAL MEDICINE

## 2022-11-30 PROCEDURE — 3060F POS MICROALBUMINURIA REV: CPT | Mod: CPTII,GC,S$GLB, | Performed by: INTERNAL MEDICINE

## 2022-11-30 PROCEDURE — 1159F PR MEDICATION LIST DOCUMENTED IN MEDICAL RECORD: ICD-10-PCS | Mod: CPTII,GC,S$GLB, | Performed by: INTERNAL MEDICINE

## 2022-11-30 PROCEDURE — 1101F PR PT FALLS ASSESS DOC 0-1 FALLS W/OUT INJ PAST YR: ICD-10-PCS | Mod: CPTII,GC,S$GLB, | Performed by: INTERNAL MEDICINE

## 2022-11-30 PROCEDURE — 3078F PR MOST RECENT DIASTOLIC BLOOD PRESSURE < 80 MM HG: ICD-10-PCS | Mod: CPTII,GC,S$GLB, | Performed by: INTERNAL MEDICINE

## 2022-11-30 PROCEDURE — 3051F PR MOST RECENT HEMOGLOBIN A1C LEVEL 7.0 - < 8.0%: ICD-10-PCS | Mod: CPTII,GC,S$GLB, | Performed by: INTERNAL MEDICINE

## 2022-11-30 PROCEDURE — 99999 PR PBB SHADOW E&M-EST. PATIENT-LVL V: ICD-10-PCS | Mod: PBBFAC,GC,, | Performed by: INTERNAL MEDICINE

## 2022-11-30 PROCEDURE — 1126F PR PAIN SEVERITY QUANTIFIED, NO PAIN PRESENT: ICD-10-PCS | Mod: CPTII,GC,S$GLB, | Performed by: INTERNAL MEDICINE

## 2022-11-30 PROCEDURE — 3008F BODY MASS INDEX DOCD: CPT | Mod: CPTII,GC,S$GLB, | Performed by: INTERNAL MEDICINE

## 2022-11-30 PROCEDURE — 3066F NEPHROPATHY DOC TX: CPT | Mod: CPTII,GC,S$GLB, | Performed by: INTERNAL MEDICINE

## 2022-11-30 PROCEDURE — 3051F HG A1C>EQUAL 7.0%<8.0%: CPT | Mod: CPTII,GC,S$GLB, | Performed by: INTERNAL MEDICINE

## 2022-11-30 PROCEDURE — 1101F PT FALLS ASSESS-DOCD LE1/YR: CPT | Mod: CPTII,GC,S$GLB, | Performed by: INTERNAL MEDICINE

## 2022-11-30 PROCEDURE — 1126F AMNT PAIN NOTED NONE PRSNT: CPT | Mod: CPTII,GC,S$GLB, | Performed by: INTERNAL MEDICINE

## 2022-11-30 PROCEDURE — 4010F PR ACE/ARB THEARPY RXD/TAKEN: ICD-10-PCS | Mod: CPTII,GC,S$GLB, | Performed by: INTERNAL MEDICINE

## 2022-11-30 PROCEDURE — 3288F FALL RISK ASSESSMENT DOCD: CPT | Mod: CPTII,GC,S$GLB, | Performed by: INTERNAL MEDICINE

## 2022-11-30 PROCEDURE — 99999 PR PBB SHADOW E&M-EST. PATIENT-LVL V: CPT | Mod: PBBFAC,GC,, | Performed by: INTERNAL MEDICINE

## 2022-11-30 RX ORDER — ATORVASTATIN CALCIUM 20 MG/1
20 TABLET, FILM COATED ORAL DAILY
Qty: 90 TABLET | Refills: 3 | Status: SHIPPED | OUTPATIENT
Start: 2022-11-30 | End: 2023-11-30 | Stop reason: SDUPTHER

## 2022-11-30 RX ORDER — PREGABALIN 150 MG/1
CAPSULE ORAL
Qty: 60 CAPSULE | Refills: 11 | Status: SHIPPED | OUTPATIENT
Start: 2022-11-30 | End: 2023-07-03

## 2022-11-30 NOTE — ASSESSMENT & PLAN NOTE
Encouraged diet and exercise as tolerated.  Recommended to have idea exercise regimen of 30 minutes daily for 5d/week.

## 2022-11-30 NOTE — ASSESSMENT & PLAN NOTE
Currently appears compensated aside from her LE edema, which she reports is chronic and worse after being on her legs for a prolonged period of time. SHe denies swelling in am.     Recommended compression stockings to assist with symptoms.

## 2022-11-30 NOTE — PROGRESS NOTES
MD Kimo Alvarez MD in [address] on 3/4/2022  DOBUTAMINE, ECHO in [facility] - Echo / Stress Lab on 3/14/2022  Peter Hodges MD in [address] on 6/17/2022  Peter Hodges MD in [address] on 11/30/2022    Subjective:   Patient ID:  Annika Mac is a 71 y.o. female who presents for follow-up of dizziness.     Annika Mac is a 71 y.o. y.o. female with a obesity, CVA in 2003, chronic low back pain, RUFINA on CPAP intermittently, diabetes mellitus, hypertension, hyperlipidemia, and remote history of DVT after left knee replacement. She has previously been seen for dizziness upon standing, as well as sitting up after leaning forward however denies any syncope. She is not very active, due to chronic pain including her back, hip and knee. She has seen nephrology in the past who ordered a renal US, which was negative.  Patient also complains of lower extremity edema bilaterally which he states is chronic and unchanged.       She was previously assessed for MCFADDEN and SOB after her last visit with Cardiology. A DSE was ordered and negative. She has had difficult to control blood pressure, but it is currently well controlled on the following 4 agents: amlodipine 10 mg daily, irbesartan 300 mg daily labetalol 300 mg b.i.d. and chlorthalidone 25 mg daily. At her prior visit in June 2022 orthostatics performed in clinic: 128/54 HR 74 lying down (dizzy), 122/60 HR 74 standing (fatigued), and 128/50 HR 72 (dizzy). Her dizziness was attributed to deconditioning.    Interval History:    Reports her dizziness is a little better. She had a fall on Thanksgiving. It was a mechanical fall, as one side of her scooter was unlocked and she reached over the counter. She fell as the scooter moved out of placed. She subseqeuntly had pain on her side, which she treated with a heating pad. Her only complaint today is hair loss, which she is concerned may be  attributed to one of her medications.     Diet/Salt intake: cooks own, reports low salt. Reports some canned food use but rinses it prior   Exercise: participates with physical therapy 2/weekly. Limited due to right knee pain.  BP monitoring at home: reports SBP of 110s-120    Tobacco: denies, remote lake of tobacco   Alcohol: denies, rare use on holidays  Illicit Drug use: denies  Medication compliance: strict     Medical:   Surgical: Reviewed, as below.  Family: Reviewed, as below.  Social: Reviewed, as below.       Review of Systems   Constitutional: Negative for chills, decreased appetite and fever.   HENT:  Negative for congestion and sore throat.    Eyes:  Negative for blurred vision and discharge.   Cardiovascular:  Negative for chest pain, claudication, cyanosis, dyspnea on exertion and leg swelling.   Respiratory:  Negative for cough, hemoptysis and shortness of breath.    Endocrine: Negative for cold intolerance and heat intolerance.   Skin:  Negative for color change.   Musculoskeletal:  Negative for muscle weakness and myalgias.   Gastrointestinal:  Negative for bloating and abdominal pain.   Neurological:  Negative for dizziness, focal weakness and weakness.   Psychiatric/Behavioral:  Negative for altered mental status and depression.      Past Medical History:   Diagnosis Date    Asthma     Chronic obstructive pulmonary disease, unspecified COPD type 1/14/2022    Constipation 10/3/2019    Deep vein thrombophlebitis of left leg 7/27/2012    Deep vein thrombosis     when she had a knee replacement    Diabetic foot ulcer with osteomyelitis     Encounter for blood transfusion     anemic     GERD (gastroesophageal reflux disease)     Obesity, diabetes, and hypertension syndrome 2/13/2019    Obstructive sleep apnea 7/27/2012    PAD (peripheral artery disease) 11/21/2013    Pressure ulcer of toe of left foot     Type 2 diabetes mellitus with obesity 8/19/2020    Type 2 diabetes mellitus with peripheral artery  disease 2020       Past Surgical History:   Procedure Laterality Date    CATARACT EXTRACTION W/  INTRAOCULAR LENS IMPLANT Left 3/2002    left     CATARACT EXTRACTION W/  INTRAOCULAR LENS IMPLANT Right     OD dr. olivares     SECTION      COLONOSCOPY N/A 2018    Procedure: COLONOSCOPY;  Surgeon: Faizan Mac MD;  Location: [facility] ENDO (2ND FLR);  Service: Endoscopy;  Laterality: N/A;    CYST REMOVAL  2011    left side of face    CYSTOSCOPY W/ RETROGRADES Left 2020    Procedure: CYSTOSCOPY, WITH RETROGRADE PYELOGRAM;  Surgeon: Noman Rivas MD;  Location: [facility] OR 1ST FLR;  Service: Urology;  Laterality: Left;  30min    DE QUERVAIN'S RELEASE  2021    Procedure: RELEASE, HAND, FOR DEQUERVAIN'S TENOSYNOVITIS;  Surgeon: Marky Hagen MD;  Location: [facility];  Service: Orthopedics;;    EYE SURGERY Bilateral     eye implants    GANGLION CYST EXCISION  2007    Right wrist    INJECTION OF ANESTHETIC AGENT AROUND MEDIAL BRANCH NERVES INNERVATING LUMBAR FACET JOINT Bilateral 2022    Procedure: Block-nerve-medial branch-lumbar bilateral L4-5 and L5-S1;  Surgeon: Alireza Meraz Jr., MD;  Location: [facility] PAIN MGT;  Service: Pain Management;  Laterality: Bilateral;  pt is diabetic   asa    INJECTION OF ANESTHETIC AGENT AROUND MEDIAL BRANCH NERVES INNERVATING LUMBAR FACET JOINT Bilateral 2022    Procedure: Block-nerve-medial branch-lumbar bilaterl L4-5 and L5-S1;  Surgeon: Alireza Meraz Jr., MD;  Location: [facility] PAIN MGT;  Service: Pain Management;  Laterality: Bilateral;  pt is diabetic     INJECTION OF FACET JOINT Bilateral 2021    Procedure: INJECTION, FACET JOINT--Bilateral L4-5, L5-S1;  Surgeon: Alireza Meraz Jr., MD;  Location: [facility] PAIN MGT;  Service: Pain Management;  Laterality: Bilateral;  Oral    INTERPOSITION ARTHROPLASTY OF CARPOMETACARPAL JOINTS Left 2021    Procedure: INTERPOSITION ARTHROPLASTY, CMC JOINT - left dequervains and cmc  arthroplasty, arthrex;  Surgeon: Marky Hagen MD;  Location: [facility] OR;  Service: Orthopedics;  Laterality: Left;    JOINT REPLACEMENT Left     Pan Retinal Photocoagulation Bilateral     Dr. Monterroso (Proliferative Diabetic Retinopathy)    RADIOFREQUENCY THERMOCOAGULATION Right 5/12/2022    Procedure: RADIOFREQUENCY THERMAL COAGULATION RIGHT L4-5, L5-S1 (IV Sedation);  Surgeon: Alireza Meraz Jr., MD;  Location: [facility] PAIN MGT;  Service: Pain Management;  Laterality: Right;  diabetic    RADIOFREQUENCY THERMOCOAGULATION Left 5/19/2022    Procedure: RADIOFREQUENCY THERMAL COAGULATION LEFT L4-5, L5-S1 (IV Sedation);  Surgeon: Alireza Meraz Jr., MD;  Location: [facility] PAIN MGT;  Service: Pain Management;  Laterality: Left;  diabetic    TOTAL ABDOMINAL HYSTERECTOMY  1982    TOTAL KNEE ARTHROPLASTY  2/2006    left    TRIGGER FINGER RELEASE  9/18/2009       Family History   Problem Relation Age of Onset    Diabetes Mother     Hypertension Mother     Heart disease Father     Diabetes Sister     No Known Problems Sister     No Known Problems Brother     Thyroid disease Brother     Diabetes Brother     Hypertension Brother     No Known Problems Daughter     No Known Problems Daughter     No Known Problems Son     Amblyopia Neg Hx     Blindness Neg Hx     Glaucoma Neg Hx     Macular degeneration Neg Hx     Retinal detachment Neg Hx     Strabismus Neg Hx     Colon cancer Neg Hx     Esophageal cancer Neg Hx          Current Outpatient Medications:     albuterol (PROAIR HFA) 90 mcg/actuation inhaler, Inhale 2 puffs into the lungs every 6 (six) hours as needed for Wheezing. Rescue, Disp: 18 g, Rfl: 6    allopurinoL (ZYLOPRIM) 300 MG tablet, Take 300 mg by mouth once daily., Disp: , Rfl:     amLODIPine (NORVASC) 10 MG tablet, Take 1 tablet (10 mg total) by mouth once daily., Disp: 90 tablet, Rfl: 3    aspirin 81 MG Chew, Take 1 tablet (81 mg total) by mouth once daily., Disp: , Rfl: 0    BD ULTRA-FINE KIKA PEN NEEDLE 32 gauge x  "5/32" Ndle, To use 4 times daily, Disp: 200 each, Rfl: 20    blood sugar diagnostic Strp, 1 strip 4 times daily. Contour Next., Disp: 200 strip, Rfl: 11    blood-glucose meter kit, 1 each by Other route once daily. Use daily. Insurance proffered one touch  E11.42, Disp: 1 each, Rfl: 0    chlorthalidone (HYGROTEN) 25 MG Tab, Take 1 tablet (25 mg total) by mouth once daily., Disp: 30 tablet, Rfl: 11    dulaglutide (TRULICITY) 3 mg/0.5 mL pen injector, Inject 3 mg into the skin every 7 days., Disp: 12 pen, Rfl: 3    DULoxetine (CYMBALTA) 30 MG capsule, TAKE 1 CAPSULE BY MOUTH  ONCE DAILY, Disp: 90 capsule, Rfl: 3    famotidine (PEPCID) 20 MG tablet, Take 1 tablet (20 mg total) by mouth 2 (two) times daily., Disp: 1 tablet, Rfl: 0    glucagon (GVOKE HYPOPEN 1-PACK) 0.5 mg/0.1 mL AtIn, Inject 1 Dose into the skin daily as needed (for severe hypoglycemia if you aren't able to treat by ingesting sugar)., Disp: 1 Syringe, Rfl: 3    HUMALOG KWIKPEN INSULIN 100 unit/mL pen, INJECT SUBCUTANEOUSLY 11  UNITS INTO THE SKIN 3 TIMES DAILY BEFORE MEALS PLUS  SLIDING SCALE - MAX TOTAL  DAILY DOSE 63 UNITS, Disp: 30 mL, Rfl: 6    HYDROcodone-acetaminophen (NORCO) 5-325 mg per tablet, Take 1 tablet by mouth every 8 (eight) hours as needed for Pain., Disp: 13 tablet, Rfl: 0    insulin degludec (TRESIBA FLEXTOUCH U-100) 100 unit/mL (3 mL) insulin pen, Inject 40 Units into the skin once daily., Disp: 15 pen, Rfl: 3    irbesartan (AVAPRO) 300 MG tablet, Take 1 tablet (300 mg total) by mouth every evening., Disp: 90 tablet, Rfl: 3    labetaloL (NORMODYNE) 300 MG tablet, Take 1 tablet (300 mg total) by mouth 2 (two) times daily., Disp: 180 tablet, Rfl: 3    lancets 31 gauge Misc, 1 lancet by Misc.(Non-Drug; Combo Route) route 4 (four) times daily. E11.42 . Prescribed one touch, Disp: 200 each, Rfl: 6    multivitamin (THERAGRAN) per tablet, Take 1 tablet by mouth once daily., Disp: , Rfl:     mupirocin (BACTROBAN) 2 % ointment, Apply " "topically 2 (two) times daily., Disp: , Rfl:     pregabalin (LYRICA) 150 MG capsule, Take 1 capsule (150 mg total) by mouth 2 (two) times daily., Disp: 60 capsule, Rfl: 6    ammonium lactate 12 % Crea, Apply twice daily to affected parts both feet as needed., Disp: 140 g, Rfl: 11    atorvastatin (LIPITOR) 20 MG tablet, Take 1 tablet (20 mg total) by mouth once daily., Disp: 90 tablet, Rfl: 3    colchicine (COLCRYS) 0.6 mg tablet, TAKE 1 TABLET EVERY OTHER DAY, Disp: 45 tablet, Rfl: 3    ferrous sulfate (FEOSOL) Tab tablet, Take 1 tablet (1 each total) by mouth once daily., Disp: 90 tablet, Rfl: 6    sodium bicarbonate 325 MG tablet, Take 2 tablets (650 mg total) by mouth 2 (two) times daily., Disp: 120 tablet, Rfl: 11  No current facility-administered medications for this visit.    Facility-Administered Medications Ordered in Other Visits:     fentaNYL injection 25 mcg, 25 mcg, Intravenous, Q5 Min PRN, Desmond Fontana MD, 50 mcg at 01/08/21 0955    midazolam (VERSED) 1 mg/mL injection 0.5 mg, 0.5 mg, Intravenous, PRN, Desmond Fontana MD, 2 mg at 01/08/21 0955  Objective:   /60 (BP Location: Left arm, Patient Position: Sitting, BP Method: Large (Automatic))   Pulse 78   Ht 5' 5" (1.651 m)   Wt 132.3 kg (291 lb 10.7 oz)   SpO2 96%   BMI 48.54 kg/m²      Physical Exam  Constitutional:       Appearance: She is well-developed.   HENT:      Head: Normocephalic and atraumatic.      Right Ear: External ear normal.      Left Ear: External ear normal.   Eyes:      Conjunctiva/sclera: Conjunctivae normal.   Cardiovascular:      Rate and Rhythm: Normal rate and regular rhythm.      Pulses: Intact distal pulses.           Radial pulses are 2+ on the right side and 2+ on the left side.      Heart sounds: Murmur (systolic murmur consistent with aortic sclerosis) heard.   Pulmonary:      Effort: Pulmonary effort is normal. No respiratory distress.      Breath sounds: Normal breath sounds. No " wheezing.   Abdominal:      General: Bowel sounds are normal. There is no distension.      Palpations: Abdomen is soft.      Tenderness: There is no abdominal tenderness.   Musculoskeletal:         General: Swelling (bilateral 1+ edema. reportedly chronic) present. Normal range of motion.      Cervical back: Normal range of motion and neck supple.   Skin:     General: Skin is warm and dry.   Neurological:      Mental Status: She is alert and oriented to person, place, and time.   Psychiatric:         Mood and Affect: Mood normal.         Behavior: Behavior normal.     Lab Results   Component Value Date     08/24/2022    K 4.3 08/24/2022     08/24/2022    CO2 24 08/24/2022    BUN 29 (H) 08/24/2022    CREATININE 1.6 (H) 08/24/2022    ANIONGAP 10 08/24/2022     Lab Results   Component Value Date    HGBA1C 7.4 (H) 08/24/2022     No results found for: BNP, BNPTRIAGEBLO    Lab Results   Component Value Date    WBC 4.87 06/13/2022    WBC 4.95 06/13/2022    HGB 9.1 (L) 06/13/2022    HGB 9.1 (L) 06/13/2022    HCT 29.0 (L) 06/13/2022    HCT 28.7 (L) 06/13/2022     06/13/2022     06/13/2022    GRAN 2.7 06/13/2022    GRAN 54.5 06/13/2022    GRAN 2.7 06/13/2022     Lab Results   Component Value Date    CHOL 138 01/07/2022    HDL 38 (L) 01/07/2022    LDLCALC 80.0 01/07/2022    TRIG 100 01/07/2022        Assessment:     1. Hair loss    2. History of stroke    3. Essential hypertension    4. Dyslipidemia associated with type 2 diabetes mellitus    5. Diastolic dysfunction    6. Type 2 diabetes mellitus with stage 3b chronic kidney disease and hypertension    7. Morbid obesity        Plan:   Hair loss  Patient asking if antihypertensive meds might be causing her hair loss. I discussed with her that this was not a common effect of her medications.  Will make referral to Dermatology for further evaluation.    History of stroke  Continue on aspirin.   Will switch her simvastatin to atorvastatin since she is  "also on norvasc    Essential hypertension  Hypertension:  -BP today: /60 (BP Location: Left arm, Patient Position: Sitting, BP Method: Large (Automatic))   Pulse 78   Ht 5' 5" (1.651 m)   Wt 132.3 kg (291 lb 10.7 oz)   SpO2 96%   BMI 48.54 kg/m²   -HTN well controlled, pt advised to continue current management of irbesartan, chlorthalidone, amlodipine, and labetalol   -Pt advised to be compliant with their treatment regimen daily to avoid BP fluctuation and any complications.   -Pt advised to monitor their blood pressure regularly and bring their recorded results to next office visit.  -Pt educated that it is important to eat right. Following a reasonable-calorie low-salt diet has been shown to control blood pressure better with less medications. Recommended to exercise at least 30 mins a day for 5 days a week and also told to avoid smoking all together.        Dyslipidemia associated with type 2 diabetes mellitus  Switch simvastatin to atorvastatin since she's on norvasc as well    Diastolic dysfunction  Currently appears compensated aside from her LE edema, which she reports is chronic and worse after being on her legs for a prolonged period of time. SHe denies swelling in am.     Recommended compression stockings to assist with symptoms.    Type 2 diabetes mellitus with stage 3b chronic kidney disease and hypertension  Management per PCP and endo  Increases cardiovasc risk profile.    Morbid obesity  Encouraged diet and exercise as tolerated.  Recommended to have idea exercise regimen of 30 minutes daily for 5d/week.        Patient seen and examined with attending Cardiologist, Dr. Nance, who agrees with management and plan.    Peter Hodges MD  Cardiovascular Disease Fellow PGY VI  Pager [phone]        "

## 2022-11-30 NOTE — ASSESSMENT & PLAN NOTE
"Hypertension:  -BP today: /60 (BP Location: Left arm, Patient Position: Sitting, BP Method: Large (Automatic))   Pulse 78   Ht 5' 5" (1.651 m)   Wt 132.3 kg (291 lb 10.7 oz)   SpO2 96%   BMI 48.54 kg/m²   -HTN well controlled, pt advised to continue current management of irbesartan, chlorthalidone, amlodipine, and labetalol   -Pt advised to be compliant with their treatment regimen daily to avoid BP fluctuation and any complications.   -Pt advised to monitor their blood pressure regularly and bring their recorded results to next office visit.  -Pt educated that it is important to eat right. Following a reasonable-calorie low-salt diet has been shown to control blood pressure better with less medications. Recommended to exercise at least 30 mins a day for 5 days a week and also told to avoid smoking all together.      "

## 2022-11-30 NOTE — ASSESSMENT & PLAN NOTE
Patient asking if antihypertensive meds might be causing her hair loss. I discussed with her that this was not a common effect of her medications.  Will make referral to Dermatology for further evaluation.

## 2022-12-01 ENCOUNTER — TELEPHONE (OUTPATIENT)
Dept: ENDOCRINOLOGY | Facility: CLINIC | Age: 72
End: 2022-12-01
Payer: MEDICARE

## 2022-12-01 ENCOUNTER — OFFICE VISIT (OUTPATIENT)
Dept: ORTHOPEDICS | Facility: CLINIC | Age: 72
End: 2022-12-01
Payer: MEDICARE

## 2022-12-01 ENCOUNTER — CLINICAL SUPPORT (OUTPATIENT)
Dept: DIABETES | Facility: CLINIC | Age: 72
End: 2022-12-01
Payer: MEDICARE

## 2022-12-01 VITALS — HEIGHT: 65 IN | BODY MASS INDEX: 47.84 KG/M2 | WEIGHT: 287.13 LBS

## 2022-12-01 DIAGNOSIS — M17.11 PRIMARY OSTEOARTHRITIS OF RIGHT KNEE: ICD-10-CM

## 2022-12-01 DIAGNOSIS — E66.01 MORBID OBESITY: Primary | ICD-10-CM

## 2022-12-01 DIAGNOSIS — E11.22 TYPE 2 DIABETES MELLITUS WITH STAGE 3B CHRONIC KIDNEY DISEASE AND HYPERTENSION: Primary | ICD-10-CM

## 2022-12-01 DIAGNOSIS — I12.9 TYPE 2 DIABETES MELLITUS WITH STAGE 3B CHRONIC KIDNEY DISEASE AND HYPERTENSION: Primary | ICD-10-CM

## 2022-12-01 DIAGNOSIS — N18.32 TYPE 2 DIABETES MELLITUS WITH STAGE 3B CHRONIC KIDNEY DISEASE AND HYPERTENSION: Primary | ICD-10-CM

## 2022-12-01 PROCEDURE — 4010F ACE/ARB THERAPY RXD/TAKEN: CPT | Mod: CPTII,S$GLB,, | Performed by: ORTHOPAEDIC SURGERY

## 2022-12-01 PROCEDURE — 99999 PR PBB SHADOW E&M-EST. PATIENT-LVL IV: CPT | Mod: PBBFAC,,, | Performed by: ORTHOPAEDIC SURGERY

## 2022-12-01 PROCEDURE — G0108 DIAB MANAGE TRN  PER INDIV: HCPCS | Mod: S$GLB,,, | Performed by: INTERNAL MEDICINE

## 2022-12-01 PROCEDURE — 1159F PR MEDICATION LIST DOCUMENTED IN MEDICAL RECORD: ICD-10-PCS | Mod: CPTII,S$GLB,, | Performed by: ORTHOPAEDIC SURGERY

## 2022-12-01 PROCEDURE — 1125F PR PAIN SEVERITY QUANTIFIED, PAIN PRESENT: ICD-10-PCS | Mod: CPTII,S$GLB,, | Performed by: ORTHOPAEDIC SURGERY

## 2022-12-01 PROCEDURE — 1160F PR REVIEW ALL MEDS BY PRESCRIBER/CLIN PHARMACIST DOCUMENTED: ICD-10-PCS | Mod: CPTII,S$GLB,, | Performed by: ORTHOPAEDIC SURGERY

## 2022-12-01 PROCEDURE — 1101F PR PT FALLS ASSESS DOC 0-1 FALLS W/OUT INJ PAST YR: ICD-10-PCS | Mod: CPTII,S$GLB,, | Performed by: ORTHOPAEDIC SURGERY

## 2022-12-01 PROCEDURE — 3060F POS MICROALBUMINURIA REV: CPT | Mod: CPTII,S$GLB,, | Performed by: ORTHOPAEDIC SURGERY

## 2022-12-01 PROCEDURE — G0108 PR DIAB MANAGE TRN  PER INDIV: ICD-10-PCS | Mod: S$GLB,,, | Performed by: INTERNAL MEDICINE

## 2022-12-01 PROCEDURE — 1125F AMNT PAIN NOTED PAIN PRSNT: CPT | Mod: CPTII,S$GLB,, | Performed by: ORTHOPAEDIC SURGERY

## 2022-12-01 PROCEDURE — 3008F BODY MASS INDEX DOCD: CPT | Mod: CPTII,S$GLB,, | Performed by: ORTHOPAEDIC SURGERY

## 2022-12-01 PROCEDURE — 3066F NEPHROPATHY DOC TX: CPT | Mod: CPTII,S$GLB,, | Performed by: ORTHOPAEDIC SURGERY

## 2022-12-01 PROCEDURE — 3008F PR BODY MASS INDEX (BMI) DOCUMENTED: ICD-10-PCS | Mod: CPTII,S$GLB,, | Performed by: ORTHOPAEDIC SURGERY

## 2022-12-01 PROCEDURE — 3288F FALL RISK ASSESSMENT DOCD: CPT | Mod: CPTII,S$GLB,, | Performed by: ORTHOPAEDIC SURGERY

## 2022-12-01 PROCEDURE — 99213 OFFICE O/P EST LOW 20 MIN: CPT | Mod: S$GLB,,, | Performed by: ORTHOPAEDIC SURGERY

## 2022-12-01 PROCEDURE — 4010F PR ACE/ARB THEARPY RXD/TAKEN: ICD-10-PCS | Mod: CPTII,S$GLB,, | Performed by: ORTHOPAEDIC SURGERY

## 2022-12-01 PROCEDURE — 3288F PR FALLS RISK ASSESSMENT DOCUMENTED: ICD-10-PCS | Mod: CPTII,S$GLB,, | Performed by: ORTHOPAEDIC SURGERY

## 2022-12-01 PROCEDURE — 1159F MED LIST DOCD IN RCRD: CPT | Mod: CPTII,S$GLB,, | Performed by: ORTHOPAEDIC SURGERY

## 2022-12-01 PROCEDURE — 3066F PR DOCUMENTATION OF TREATMENT FOR NEPHROPATHY: ICD-10-PCS | Mod: CPTII,S$GLB,, | Performed by: ORTHOPAEDIC SURGERY

## 2022-12-01 PROCEDURE — 1160F RVW MEDS BY RX/DR IN RCRD: CPT | Mod: CPTII,S$GLB,, | Performed by: ORTHOPAEDIC SURGERY

## 2022-12-01 PROCEDURE — 99999 PR PBB SHADOW E&M-EST. PATIENT-LVL IV: ICD-10-PCS | Mod: PBBFAC,,, | Performed by: ORTHOPAEDIC SURGERY

## 2022-12-01 PROCEDURE — 1101F PT FALLS ASSESS-DOCD LE1/YR: CPT | Mod: CPTII,S$GLB,, | Performed by: ORTHOPAEDIC SURGERY

## 2022-12-01 PROCEDURE — 3060F PR POS MICROALBUMINURIA RESULT DOCUMENTED/REVIEW: ICD-10-PCS | Mod: CPTII,S$GLB,, | Performed by: ORTHOPAEDIC SURGERY

## 2022-12-01 PROCEDURE — 3051F PR MOST RECENT HEMOGLOBIN A1C LEVEL 7.0 - < 8.0%: ICD-10-PCS | Mod: CPTII,S$GLB,, | Performed by: ORTHOPAEDIC SURGERY

## 2022-12-01 PROCEDURE — 3051F HG A1C>EQUAL 7.0%<8.0%: CPT | Mod: CPTII,S$GLB,, | Performed by: ORTHOPAEDIC SURGERY

## 2022-12-01 PROCEDURE — 99213 PR OFFICE/OUTPT VISIT, EST, LEVL III, 20-29 MIN: ICD-10-PCS | Mod: S$GLB,,, | Performed by: ORTHOPAEDIC SURGERY

## 2022-12-01 NOTE — PROGRESS NOTES
Subjective:      Patient ID: Annika Mac is a 71 y.o. female.    Chief Complaint:   Pain of the Right Knee    Pain    She came in to again discuss her right knee osteoarthritis pain.  She does have some pain associated with her left total knee, but it is much better than the right.  She definitely wants to have another knee replacement when she can get her weight down to qualify.  She said she has lost a significant amount of weight and is determined to have this knee replaced. She does have some night pain interfering with sleep.  No groin pain, distal numbness or tingling.  She has seen pain management and also had RFA in her L spine.       Objective:        Ortho/SPM Exam    Right knee:There is significant varus deformity, which is not passively correctable.  Active range of motion is 5-95 degrees.  Anterior crepitus with active extension.  Patellar mobility is limited.  Boggy synovitis without effusion.  Medial joint line tenderness predominates.  No instability to varus/valgus/Lachman's stressing.  No pain in the groin with flexion and internal rotation of the hip which is not limited.  Skin intact.  Distal neurovascular intact.  Flip test negative.    Left knee:On exam, the scar is well healed without redness or tenderness.  There is no effusion.  Active range of motion is 0-105° without crepitus.  No instability to varus/valgus stress in extension or flexion.  No excessive sagittal plane instability.  Calves soft, nontender.  Distal neurovascular intact.          IMAGING:  We updated her weight-bearing x-rays today and they show well-fixed and positioned left TKA components without signs of loosening or other complications.  She has end-stage varus arthrosis of the right knee without focal bone defects.  Assessment:   Advanced DJD, right knee      Plan:     -Referral placed to bariatric medicine for aid in weight loss prior to R TKA    The patient's pathophysiology was explained in detail with reference to  x-rays, models, other visual aids as appropriate.  Treatment options were discussed in detail.  Questions were invited and answered to the patient's satisfaction.    Greater than 50% of this 15 minute visit was devoted to counseling and coordination of care      Faizan Riley MD  Orthopedic Surgery

## 2022-12-02 ENCOUNTER — TELEPHONE (OUTPATIENT)
Dept: ENDOCRINOLOGY | Facility: CLINIC | Age: 72
End: 2022-12-02
Payer: MEDICARE

## 2022-12-02 VITALS — WEIGHT: 286.63 LBS | BODY MASS INDEX: 47.69 KG/M2

## 2022-12-02 NOTE — PROGRESS NOTES
Diabetes Care Specialist Progress Note  Author: Maria G Lopez RN, CDE  Date: 12/1/2022    Program Intake  Reason for Diabetes Program Visit:: Intervention  Type of Intervention:: Individual  Individual: Education  Education: Nutrition and Meal Planning  Current diabetes risk level:: moderate  In the last 12 months, have you:: none  Permission to speak with others about care:: no    Lab Results   Component Value Date    HGBA1C 7.4 (H) 08/24/2022       Clinical      Problem Review  Reviewed Problem List with Patient: yes  Active comorbidities affecting diabetes self-care.: yes  Comorbidities: Hypertension, Chronic Kidney Disease, Other (comment) (Difficulty with ambulation)  Reviewed health maintenance: yes    Clinical Assessment  Have you ever experienced hypoglycemia (low blood sugar)?: yes  In the last month, how often have you experienced low blood sugar?: once a day  What symptoms do you experience?: Dizzy/Light-headed, Shaky  Have you ever been hospitalized because your blood sugar was too low?: yes (see comments) (Was treated at hospital in April for low bg)  Have you ever experienced hyperglycemia (high blood sugar)?: yes  In the last month, how often have you experienced high blood sugar?: more than once a day       Nutritional Status  Meal Plan 24 Hour Recall: Breakfast, Lunch, Snack  Meal Plan 24 Hour Recall - Breakfast: yesterday; waffles, eggs, turkey phan  Meal Plan 24 Hour Recall - Lunch: cabbage and corn bread  Meal Plan 24 Hour Recall - Snack: juice, peanut butter crackers    Additional Social History    Support  Does anyone support you with your diabetes care?: yes    Access to Mass Media & Technology  Does the patient have access to any of the following devices or technologies?: Smart phone (Not compatible with Dexcom nicholas)    Cognitive/Behavioral Health  Alert and Oriented: Yes  Difficulty Thinking: No  Requires Prompting: No  Requires assistance for routine expression?: No  Cognitive or  behavioral barriers impacting ability to self-manage diabetes?: No    Communication  Language preference: English  Hearing Problems: No  Vision Problems: No  Communication needs impacting ability to self-manage diabetes?: No    Health Literacy  Preferred Learning Method: Face to Face      Diabetes Self-Management Skills Assessment    Diabetes Disease Process/Treatment Options  Patient/caregiver able to state what happens when someone has diabetes.: yes  Patient/caregiver knows what type of diabetes they have.: yes  Diabetes Type : Type II  Assessment indicates:: Adequate understanding  Area of need?: No    Nutrition/Healthy Eating  Challenges to healthy eating:: portion control  Method of carbohydrate measurement:: no method  Patient can identify foods that impact blood sugar.: yes  Patient-identified foods:: sweets  Assessment indicates:: Knowledge deficit, Instruction Needed  Area of need?: Yes    Physical Activity/Exercise  Patient's daily activity level:: sedentary  Patient formally exercises outside of work.: no  Reasons for not exercising:: physically unable to exercise currently  Patient can identify forms of physical activity.: no  Assessment indicates:: Knowledge deficit  Area of need?: Yes    Medications  Patient is able to describe current diabetes management routine.: yes  Diabetes management routine:: insulin  Patient is able to identify current diabetes medications, dosages, and appropriate timing of medications.: yes  Patient understands the purpose of the medications taken for diabetes.: yes  Patient reports problems or concerns with current medication regimen.: yes  Medication regimen problems/concerns:: concerned about side effects  Assessment indicates:: Adequate understanding  Area of need?: No    Home Blood Glucose Monitoring  Patient states that blood sugar is checked at home daily.: yes  Monitoring Method:: home glucometer  Home glucometer meter type:: Insurance preferred meter  Blood glucose  logs:: yes, encouraged to keep logs, encouraged to bring logs to provider visits  Blood glucose logs reviewed today?: yes  Assessment indicates:: Adequate understanding  Area of need?: No    Acute Complications  Able to state the blood sugar range for hypoglycemia?: yes  Patient can identify general symptoms of hypoglycemia: yes  Patient identified:: shakiness  Able to state proper treatment of hypoglycemia?: yes  Patient identified:: 5-6 pieces of hard candy, 1/2 can soda/fruit juice  Assessment indicates:: Adequate understanding  Area of need?: No    Chronic Complications  Patient can identify major chronic complications of diabetes.: yes  Stated chronic complications:: kidney disease  Patient can identify ways to prevent or delay diabetes complications.: yes  Stated ways to prevent complications:: controlling blood sugar  Assessment indicates:: Knowledge deficit  Area of need?: Yes    Psychosocial/Coping  Patient can identify ways of coping with chronic disease.: yes  Patient-stated ways of coping with chronic disease:: support from loved ones  Assessment indicates:: Adequate understanding  Area of need?: No         Assessment Summary and Plan    Based on today's diabetes care assessment, the following areas of need were identified:      Social 12/1/2022   Cognitive/Behavioral Health No   Communication No        Clinical 8/31/2022   Nutritional Status Yes        Diabetes Self-Management Skills 12/1/2022   Diabetes Disease Process/Treatment Options No   Nutrition/Healthy Eating Yes, see care planning   Physical Activity/Exercise Yes, encouraged chair exerices   Medication No   Home Blood Glucose Monitoring No   Acute Complications No   Chronic Complications Yes, explained long term issues associated with diabetes   Psychosocial/Coping No          Today's interventions were provided through individual discussion, instruction, and written materials were provided.      Patient verbalized understanding of instruction  and written materials.  Pt was able to return back demonstration of instructions today. Patient understood key points, needs reinforcement and further instruction.     Diabetes Self-Management Care Plan:    Today's Diabetes Self-Management Care Plan was developed with Annika's input. Annika has agreed to work toward the following goal(s) to improve his/her overall diabetes control.      Care Plan: Diabetes Management   Updates made since 11/2/2022 12:00 AM        Problem: Medications         Goal: Patient Agrees to take Diabetes Medication(s) as prescribed.    Start Date: 5/4/2021   Expected End Date: 2/13/2023   This Visit's Progress: On track   Recent Progress: On track   Priority: High   Barriers: No Barriers Identified   Note:    Dexcom download was evaluated.  She is having lows at various times of the days. Most lows are occurring overnight and in the afternoon which corresponds to her reported low she experinced a few weeks back where intervention was required. Denies any missed doses and is eating 3 meals per day.  Per HCP her insulin will be reduced to the following: Novolin 70/30 80 units in am and 40 units in pm.  She will stop giving the lunch dose.     She records her carb amounts in Dexcom, however she has been entering them incorrectly and reflects a larger amount of carbs than what she is consuming.   She was instructed on how to enter the correct amt into her Dexcom.     Update to care planning 2/17/2022:  Currently taking Tresiba 30 units in am, Humalog 10 units ac meals and Ozempic.  Blood glucose logs reviewed.  Patient doing well with her numbers see attached logs.  Not using the Dexcom.  Current A1c down to 6.6%    Update to care planning 4/18/2022:  current A1c is up at 8.1%.  this is an increase from January A1c.  Dr. Love has increased her Tresiba to 35 and Humalog 10/11/11 plus s/s.  She also has been in pain with her back which is causing her stress and discomfort.  This may also be a  contributing factor to her elevated numbers. Continues with ozempic.     Update to care planning 5/9/2022: Patient today for DM ed.  BG logs reviewed by MD. Dr. Love increased her Tresiba from 35 to 40 units daily for elevations especially in am.  Ms. Mac was not using the sliding scale as ordered.  Reviewed how to use it and instructed to use 150 +1 s/s with Humalog only.  Currently takes 10/11/11 with meals.  Understanding verbalized.     Update to care planning 12/1/2022: Ms. Roblero was seen for review of her bg logs. Mostly elevated in 152 to 198 range in morning; lunch and dinner same.  Her A1c has improved since April.  Reviewed logs with Dr. Love and will make the following changes: Increase Tresiba to 45 units in am and Humalog 12 units ac meals plus s/s 150 +2.  Understanding verbalized.        Problem: Healthy Eating         Goal: Eat 2-3 meals daily with 30-45g/2-3 servings of Carbohydrate per meal. Limit snacking in between meal to 1 serving (15 grams).    Start Date: 8/31/2022   Expected End Date: 4/10/2023   This Visit's Progress: On track   Recent Progress: Not met   Priority: Medium   Barriers: Lack of Motivation to Change; No Barriers Identified   Note:    Reviewed meal planning and general nutritional counseling with regards to diabetes management. Meal habits reviewed. We concentrated on portion sizes and carb limits for each meal.  This has been a challenge for Annika in the past.  Stress the importance of making better meal choices and to measure all foods.   Encouraged increased consumption of non starchy veggies to diet.  Overall planning meals and making better choices.  Patient is motivated to make changes.     Update to care planning 4/18/2022: [facility] eating schedule is off since she cares for grandchildren 3-5 x per week and tends to eat what is available. Suggested bringing fruit and salad with her when she is out of the house and to try to avoid snack type foods. [facility] wants to do  better and is willing to try.     Update to care planning 5/9/2022:  Reviewed changes made since last visit.  States she feels she is more on schedule with her meals now.  Usually doing a shake for breakfast.  Encouraged to measure all foods especially carbs. States she is eating more salads.     Update to care planning 8/31/22: Pt stated she needed help with carb counting. We reviewed the carb list, carb servings and grams of carbs. I also showed her the plate method and she seemed interested in using that to monitor her carb intake.     Update to care planning 12/1/2022:  Reviewed meal planning today.  Doing better, less snacking. She needs to lose 18 lbs to qualify for knee replacement.  Advised to stay on low carb diet, avoid any sources of sugar in foods, perform some chair exercising like stretching. Increase consumption of non starchy veggies in diet.  No snacking         Follow Up Plan     In about 4 mths    Today's care plan and follow up schedule was discussed with patient.  Annika verbalized understanding of the care plan, goals, and agrees to follow up plan.        The patient was encouraged to communicate with his/her health care provider/physician and care team regarding his/her condition(s) and treatment.  I provided the patient with my contact information today and encouraged to contact me via phone or [facility] Patient Portal as needed.     Length of Visit   Total Time: 60 Minutes

## 2022-12-06 ENCOUNTER — CLINICAL SUPPORT (OUTPATIENT)
Dept: REHABILITATION | Facility: HOSPITAL | Age: 72
End: 2022-12-06
Payer: MEDICARE

## 2022-12-06 DIAGNOSIS — R29.898 DECREASED STRENGTH OF LOWER EXTREMITY: Primary | ICD-10-CM

## 2022-12-06 DIAGNOSIS — G89.29 CHRONIC PAIN OF RIGHT KNEE: ICD-10-CM

## 2022-12-06 DIAGNOSIS — M25.561 CHRONIC PAIN OF RIGHT KNEE: ICD-10-CM

## 2022-12-06 PROCEDURE — 97110 THERAPEUTIC EXERCISES: CPT | Mod: PN

## 2022-12-06 NOTE — PROGRESS NOTES
[facility]   Physical Therapy Treatment Note     Name: Annika Mac  Clinic Number: [id number]    Therapy Diagnosis:   Encounter Diagnoses   Name Primary?    Decreased strength of lower extremity Yes    Chronic pain of right knee      Physician: Homero Leggett PA*    Visit Date: 12/6/2022    Physician Orders: PT Eval and Treat   Medical Diagnosis from Referral:   M17.11 (ICD-10-CM) - Primary osteoarthritis of right knee   M25.561 (ICD-10-CM) - Acute pain of right knee   Evaluation Date: 11/9/2022  Authorization Period Expiration: 12/31/2022  Plan of Care Expiration: 2/9/2023  Visit # / Visits authorized: 3/11 (+1 eval)    Time In: 12:45pm  Time Out: 1:30pm  Total Billable Time: 45 minutes    Precautions: Standard and Diabetes    SUBJECTIVE   Pt reports: her left hip is still sore from her previous fall but progressing well.     She was compliant with home exercise program.  Response to previous treatment: good  Functional change: none noted    Pain:  Current 5/10, worst 7/10, best 5/10   Location: right knee     OBJECTIVE     Objective Measures updated at progress report unless specified.     TREATMENT     Annika received the treatments listed below:      Annika received therapeutic exercises to develop strength, endurance, ROM, flexibility, posture, and core stabilization for 40 minutes including:  NuStep 5 minutes  Ball Squeezes [id number]  Hip Abduction red band 3x10  Long Arc Quads 3x10  Hamstring Stretch on stool seated [id number]  Gastroc Stretch [id number] with strap  Heel raises 2 x 10  Step ups on 4 inch step x 10  seated hamstring curl RTB 2 x 10  Seated ankle DF RTB 2 x 10  Sit to stands 2 x10     Annika received the following manual therapy techniques: Joint mobilizations, Myofacial release, and Soft tissue Mobilization were applied to the: right knee for 0 minutes, including:    Annika participated in neuromuscular re-education activities to improve: Balance, Coordination,  Proprioception, and Posture for 5 minutes. The following activities were included:  Step Taps 4inch 3x10    Patient Education and Home Exercises     Education provided:   - - PT POC  - HEP  - PT prognosis and diagnosis  - all questions and concerns answered      Written Home Exercises Provided: yes.  Exercises were reviewed and Annika was able to demonstrate them prior to the end of the session.  Annika demonstrated fair  understanding of the education provided.      See EMR under Patient Instructions for exercises provided 11/9/2022.    ASSESSMENT   Pt tolerated therapy session well today. She continues with left hip pain but states that it is getting better. Therapy session focused on bilateral lower extremity strengthening with good tolerance. Attempted bridging exercise, but was terminated due to increased left hip pain. Continue to work on progressing standing tolerance and overall endurance.     Annika Is progressing well towards her goals.   Pt prognosis is Fair.     Pt will continue to benefit from skilled outpatient physical therapy to address the deficits listed in the problem list box on initial evaluation, provide pt/family education and to maximize pt's level of independence in the home and community environment.     Pt's spiritual, cultural and educational needs considered and pt agreeable to plan of care and goals.     Anticipated barriers to physical therapy: pain    GOALS:   Short Term Goals:  5 weeks  Patient will demonstrate independence with HEP in order to progress toward functional independence  Pt will demonstrate improved pain by reports of less than or equal to 7/10 worst pain on the verbal rating scale in order to progress toward maximal functional ability and improve QOL.  Pt will be independent with safety and use of rollator for increased community ambulation and decreased overall energy expenditure with walking activities.   Pt will improve 30 second sit to stand to greater than or equal too  7 repetitions for improved overall functional mobility.      Long Term Goals: 8 weeks  Patient will demonstrate improved function as indicated by a functional limitation score of 47% on the FOTO.  Pt will demonstrate improved pain by reports of less than or equal to 5/10 worst pain on the verbal rating scale in order to progress toward maximal functional ability and improve QOL.  Pt will improve bilateral lower extremity strength to 5/5 for improved strength needed for increased independence with functional mobility.   Pt will improve 30 second sit to stand to greater than or equal too 12 repetitions for improved overall functional mobility.   pt will exhibit equal knee flexion range of motion for improved range needed to perform functional tasks.     PLAN     Continue with physical therapy as planned.     Plan of care Certification: 11/9/2022 to 2/9/2023.    Ashley Pressley, PT, DPT

## 2022-12-12 ENCOUNTER — HOSPITAL ENCOUNTER (OUTPATIENT)
Dept: RADIOLOGY | Facility: HOSPITAL | Age: 72
Discharge: HOME | End: 2022-12-12
Attending: INTERNAL MEDICINE
Payer: MEDICARE

## 2022-12-12 VITALS — BODY MASS INDEX: 46.43 KG/M2 | WEIGHT: 279 LBS

## 2022-12-12 DIAGNOSIS — Z12.31 ENCOUNTER FOR SCREENING MAMMOGRAM FOR BREAST CANCER: ICD-10-CM

## 2022-12-12 PROCEDURE — 77067 SCR MAMMO BI INCL CAD: CPT | Mod: 26,,, | Performed by: RADIOLOGY

## 2022-12-12 PROCEDURE — 77063 BREAST TOMOSYNTHESIS BI: CPT | Mod: TC

## 2022-12-12 PROCEDURE — 77067 MAMMO DIGITAL SCREENING BILAT WITH TOMO: ICD-10-PCS | Mod: 26,,, | Performed by: RADIOLOGY

## 2022-12-12 PROCEDURE — 77063 MAMMO DIGITAL SCREENING BILAT WITH TOMO: ICD-10-PCS | Mod: 26,,, | Performed by: RADIOLOGY

## 2022-12-12 PROCEDURE — 77063 BREAST TOMOSYNTHESIS BI: CPT | Mod: 26,,, | Performed by: RADIOLOGY

## 2022-12-13 ENCOUNTER — CLINICAL SUPPORT (OUTPATIENT)
Dept: REHABILITATION | Facility: HOSPITAL | Age: 72
End: 2022-12-13
Payer: MEDICARE

## 2022-12-13 DIAGNOSIS — G89.29 CHRONIC PAIN OF RIGHT KNEE: ICD-10-CM

## 2022-12-13 DIAGNOSIS — R29.898 DECREASED STRENGTH OF LOWER EXTREMITY: Primary | ICD-10-CM

## 2022-12-13 DIAGNOSIS — M25.561 CHRONIC PAIN OF RIGHT KNEE: ICD-10-CM

## 2022-12-13 PROCEDURE — 97140 MANUAL THERAPY 1/> REGIONS: CPT | Mod: PN

## 2022-12-13 PROCEDURE — 97110 THERAPEUTIC EXERCISES: CPT | Mod: PN

## 2022-12-13 NOTE — PROGRESS NOTES
[facility]   Physical Therapy Treatment Note     Name: Annika Mac  Clinic Number: [id number]    Therapy Diagnosis:   Encounter Diagnoses   Name Primary?    Decreased strength of lower extremity Yes    Chronic pain of right knee      Physician: Homero Leggett PA*    Visit Date: 12/13/2022    Physician Orders: PT Eval and Treat   Medical Diagnosis from Referral:   M17.11 (ICD-10-CM) - Primary osteoarthritis of right knee   M25.561 (ICD-10-CM) - Acute pain of right knee   Evaluation Date: 11/9/2022  Authorization Period Expiration: 12/31/2022  Plan of Care Expiration: 2/9/2023  Visit # / Visits authorized: 5/11 (+1 eval)    PN DUE: 1/13/2023    Time In: 11:15 AM  Time Out: 12:00 PM  Total Billable Time: 45 minutes    Precautions: Standard and Diabetes    SUBJECTIVE   Pt reports: her left hip is still sore from her previous fall and increased right knee pain.     She was compliant with home exercise program.  Response to previous treatment: good  Functional change: increased overall balance     Pain:  Current 5/10, worst 7/10, best 5/10   Location: right knee     OBJECTIVE     Objective Measures updated at progress report unless specified.     GAIT DEVIATIONS:  Pt ambulates with rollator and decreased antalgic gait pattern. She continues with decreased right stance time and decreased bilateral step length.      Range of Motion:   Knee Left active Right Active   Flexion 110 degrees 105 degrees   Extension 0 degrees 0 degrees      Lower Extremity Strength   Right LE   Left LE     Knee extension: 4/5 Knee extension: 4+/5   Knee flexion: 3+/5 Knee flexion: 4+/5   Hip flexion: 4/5 Hip flexion: 4+/5   Hip extension:  4+/5 Hip extension: 4+/5   Hip abduction: 4+/5 Hip abduction: 4+/5   Hip adduction: 4+/5 Hip adduction 4+/5   Ankle dorsiflexion: 5/5 Ankle dorsiflexion: 5/5   Ankle plantarflexion: 5/5 Ankle plantarflexion: 5/5      30 Second Sit to stand Test: (normal for female 70-75 is  "10-15)  - Pt able to perform 4 sit to stand repetitions in 30 seconds without the use of chair arms  - pt able to perform 10 sit to stand repetitions in 30 seconds without the use of chair arms                 Function: FOTO    TREATMENT     Annika received the treatments listed below:  (exercises performed today are bolded)      Annika received therapeutic exercises to develop strength, endurance, ROM, flexibility, posture, and core stabilization for 35 minutes including:  NuStep 5 minutes  Ball Squeezes [id number]  Hip Abduction red band 3x10  Long Arc Quads 3x10  Hamstring Stretch on stool seated [id number]  Gastroc Stretch [id number] with strap  Heel raises 2 x 10  Step ups on 4 inch step x 10  seated hamstring curl RTB 2 x 10  Seated ankle DF RTB 2 x 10  Sit to stands 2 x10   Test and measures for progress note     Annika received the following manual therapy techniques: Joint mobilizations, Myofacial release, and Soft tissue Mobilization were applied to the: right knee for 10 minutes, including:  +foam rolling to left IT band, glutes, piriformis, and lumbar paraspinals  +Side-lying (L) long leg distraction 2 x 30"    Annika participated in neuromuscular re-education activities to improve: Balance, Coordination, Proprioception, and Posture for 0 minutes. The following activities were included:  Step Taps 4inch 3x10    Ice pack to right knee for 10 minutes. Not billed for     Patient Education and Home Exercises     Education provided:   - - PT POC  - HEP  - PT prognosis and diagnosis  - all questions and concerns answered      Written Home Exercises Provided: yes.  Exercises were reviewed and Annika was able to demonstrate them prior to the end of the session.  Annika demonstrated fair  understanding of the education provided.      See EMR under Patient Instructions for exercises provided 11/9/2022.    ASSESSMENT   Pt tolerated therapy session well today. She presents today with increased right knee pain and left hip/low back " pain. She ambulates with rolling walker and decreased overall antalgic gait with increased base of support for balance and safety. Test and measures were taken and recorded above under the objective section. She exhibits increased hip strength, increased functional mobility noted in FOTO outcome, and increased functional strength seen with improved repetition of sit to stands. Therapy session focused on bilateral lower extremity strengthening with good tolerance. Pt reports good relief with left long leg distraction. Ended session with ice to right knee while performing foam rolling to left side. Continue to work on progressing standing tolerance and overall endurance.     Annika Is progressing well towards her goals.   Pt prognosis is Fair.     Pt will continue to benefit from skilled outpatient physical therapy to address the deficits listed in the problem list box on initial evaluation, provide pt/family education and to maximize pt's level of independence in the home and community environment.     Pt's spiritual, cultural and educational needs considered and pt agreeable to plan of care and goals.     Anticipated barriers to physical therapy: pain    GOALS:   Short Term Goals:  5 weeks  Patient will demonstrate independence with HEP in order to progress toward functional independence- MET 12/13/2022  Pt will demonstrate improved pain by reports of less than or equal to 7/10 worst pain on the verbal rating scale in order to progress toward maximal functional ability and improve QOL.- progressing, not met  Pt will be independent with safety and use of rollator for increased community ambulation and decreased overall energy expenditure with walking activities. - MET 12/13/2022  Pt will improve 30 second sit to stand to greater than or equal too 7 repetitions for improved overall functional mobility. - MET 12/13/2022     Long Term Goals: 8 weeks  Patient will demonstrate improved function as indicated by a functional  limitation score of 47% on the FOTO.- progressing, not met  Pt will demonstrate improved pain by reports of less than or equal to 5/10 worst pain on the verbal rating scale in order to progress toward maximal functional ability and improve QOL.- progressing, not met  Pt will improve bilateral lower extremity strength to 5/5 for improved strength needed for increased independence with functional mobility. - progressing, not met  Pt will improve 30 second sit to stand to greater than or equal too 12 repetitions for improved overall functional mobility. - progressing, not met  pt will exhibit equal knee flexion range of motion for improved range needed to perform functional tasks. - progressing, not met    PLAN     Continue with physical therapy as planned.     Plan of care Certification: 11/9/2022 to 2/9/2023.    Ashley Pressley, PT, DPT

## 2022-12-19 ENCOUNTER — PATIENT MESSAGE (OUTPATIENT)
Dept: BARIATRICS | Facility: CLINIC | Age: 72
End: 2022-12-19
Payer: MEDICARE

## 2022-12-19 ENCOUNTER — CLINICAL SUPPORT (OUTPATIENT)
Dept: REHABILITATION | Facility: HOSPITAL | Age: 72
End: 2022-12-19
Payer: MEDICARE

## 2022-12-19 DIAGNOSIS — M25.561 CHRONIC PAIN OF RIGHT KNEE: ICD-10-CM

## 2022-12-19 DIAGNOSIS — G89.29 CHRONIC PAIN OF RIGHT KNEE: ICD-10-CM

## 2022-12-19 DIAGNOSIS — R29.898 DECREASED STRENGTH OF LOWER EXTREMITY: Primary | ICD-10-CM

## 2022-12-19 PROCEDURE — 97110 THERAPEUTIC EXERCISES: CPT | Mod: PN

## 2022-12-19 NOTE — PROGRESS NOTES
[facility]   Physical Therapy Treatment Note     Name: Annika Mac  Clinic Number: [id number]    Therapy Diagnosis:   Encounter Diagnoses   Name Primary?    Decreased strength of lower extremity Yes    Chronic pain of right knee      Physician: Homero Leggett PA*    Visit Date: 12/19/2022    Physician Orders: PT Eval and Treat   Medical Diagnosis from Referral:   M17.11 (ICD-10-CM) - Primary osteoarthritis of right knee   M25.561 (ICD-10-CM) - Acute pain of right knee   Evaluation Date: 11/9/2022  Authorization Period Expiration: 12/31/2022  Plan of Care Expiration: 2/9/2023  Visit # / Visits authorized: 6/11 (+1 eval)    PN DUE: 1/13/2023    Time In: 2:15pm  Time Out: 3:00pm  Total Billable Time: 40 minutes    Precautions: Standard and Diabetes    SUBJECTIVE   Pt reports: She feels alright today.     She was compliant with home exercise program.  Response to previous treatment: good  Functional change: increased overall balance     Pain:  Current 5/10, worst 7/10, best 5/10   Location: right knee     OBJECTIVE     Objective Measures updated at progress report unless specified.     TREATMENT     Annika received the treatments listed below:  (exercises performed today are bolded)      Annika received therapeutic exercises to develop strength, endurance, ROM, flexibility, posture, and core stabilization for 40 minutes including:  NuStep 5 minutes  Ball Squeezes [id number]  Hip Abduction red band 3x10  Long Arc Quads 3x10  Hamstring Stretch on stool seated [id number]  Gastroc Stretch [id number] with strap  Heel raises 2 x 10  Step ups on 4 inch step x 10  seated hamstring curl RTB 2 x 10  Seated ankle DF RTB 2 x 10  Sit to stands 2 x10   Side Steps in parallel bars 5x    Annika received the following manual therapy techniques: Joint mobilizations, Myofacial release, and Soft tissue Mobilization were applied to the: right knee for 0 minutes, including:  +foam rolling to left IT band, glutes,  "piriformis, and lumbar paraspinals  +Side-lying (L) long leg distraction 2 x 30"    Annika participated in neuromuscular re-education activities to improve: Balance, Coordination, Proprioception, and Posture for 0 minutes. The following activities were included:  Step Taps 4inch 3x10    Ice pack to right knee for 0 minutes. Not billed for     Patient Education and Home Exercises     Education provided:   - - PT POC  - HEP  - PT prognosis and diagnosis  - all questions and concerns answered      Written Home Exercises Provided: yes.  Exercises were reviewed and Annika was able to demonstrate them prior to the end of the session.  Annika demonstrated fair  understanding of the education provided.      See EMR under Patient Instructions for exercises provided 11/9/2022.    ASSESSMENT     Patient required rest breaks with standing activities due to knee discomfort and fatigue. Side stepping was added today to work on more standing movements. Patient was advised to work on her strengthening exercises for her knee each day to progress quicker. Overall, she tolerated today's session fair.     Annika Is progressing well towards her goals.   Pt prognosis is Fair.     Pt will continue to benefit from skilled outpatient physical therapy to address the deficits listed in the problem list box on initial evaluation, provide pt/family education and to maximize pt's level of independence in the home and community environment.     Pt's spiritual, cultural and educational needs considered and pt agreeable to plan of care and goals.     Anticipated barriers to physical therapy: pain    GOALS:   Short Term Goals:  5 weeks  Patient will demonstrate independence with HEP in order to progress toward functional independence- MET 12/13/2022  Pt will demonstrate improved pain by reports of less than or equal to 7/10 worst pain on the verbal rating scale in order to progress toward maximal functional ability and improve QOL.- progressing, not met  Pt " will be independent with safety and use of rollator for increased community ambulation and decreased overall energy expenditure with walking activities. - MET 12/13/2022  Pt will improve 30 second sit to stand to greater than or equal too 7 repetitions for improved overall functional mobility. - MET 12/13/2022     Long Term Goals: 8 weeks  Patient will demonstrate improved function as indicated by a functional limitation score of 47% on the FOTO.- progressing, not met  Pt will demonstrate improved pain by reports of less than or equal to 5/10 worst pain on the verbal rating scale in order to progress toward maximal functional ability and improve QOL.- progressing, not met  Pt will improve bilateral lower extremity strength to 5/5 for improved strength needed for increased independence with functional mobility. - progressing, not met  Pt will improve 30 second sit to stand to greater than or equal too 12 repetitions for improved overall functional mobility. - progressing, not met  pt will exhibit equal knee flexion range of motion for improved range needed to perform functional tasks. - progressing, not met    PLAN     Continue with physical therapy as planned.     Plan of care Certification: 11/9/2022 to 2/9/2023.    Jong Velasquez, PT, DPT

## 2022-12-20 ENCOUNTER — OFFICE VISIT (OUTPATIENT)
Dept: HEMATOLOGY/ONCOLOGY | Facility: CLINIC | Age: 72
End: 2022-12-20
Payer: MEDICARE

## 2022-12-20 ENCOUNTER — LAB VISIT (OUTPATIENT)
Dept: LAB | Facility: HOSPITAL | Age: 72
End: 2022-12-20
Attending: INTERNAL MEDICINE
Payer: MEDICARE

## 2022-12-20 VITALS
OXYGEN SATURATION: 98 % | HEART RATE: 65 BPM | DIASTOLIC BLOOD PRESSURE: 62 MMHG | TEMPERATURE: 98 F | RESPIRATION RATE: 18 BRPM | SYSTOLIC BLOOD PRESSURE: 133 MMHG

## 2022-12-20 DIAGNOSIS — N18.30 ANEMIA IN STAGE 3 CHRONIC KIDNEY DISEASE, UNSPECIFIED WHETHER STAGE 3A OR 3B CKD: Primary | ICD-10-CM

## 2022-12-20 DIAGNOSIS — D63.1 ANEMIA IN STAGE 3 CHRONIC KIDNEY DISEASE, UNSPECIFIED WHETHER STAGE 3A OR 3B CKD: Primary | ICD-10-CM

## 2022-12-20 DIAGNOSIS — D64.9 ANEMIA, UNSPECIFIED TYPE: ICD-10-CM

## 2022-12-20 LAB
ALBUMIN SERPL BCP-MCNC: 3.3 G/DL (ref 3.5–5.2)
ALP SERPL-CCNC: 111 U/L (ref 55–135)
ALT SERPL W/O P-5'-P-CCNC: 18 U/L (ref 10–44)
ANION GAP SERPL CALC-SCNC: 7 MMOL/L (ref 8–16)
AST SERPL-CCNC: 19 U/L (ref 10–40)
BILIRUB SERPL-MCNC: 0.3 MG/DL (ref 0.1–1)
BUN SERPL-MCNC: 28 MG/DL (ref 8–23)
CALCIUM SERPL-MCNC: 9.1 MG/DL (ref 8.7–10.5)
CHLORIDE SERPL-SCNC: 108 MMOL/L (ref 95–110)
CO2 SERPL-SCNC: 27 MMOL/L (ref 23–29)
CREAT SERPL-MCNC: 1.6 MG/DL (ref 0.5–1.4)
ERYTHROCYTE [DISTWIDTH] IN BLOOD BY AUTOMATED COUNT: 15.5 % (ref 11.5–14.5)
EST. GFR  (NO RACE VARIABLE): 33.8 ML/MIN/1.73 M^2
FERRITIN SERPL-MCNC: 416 NG/ML (ref 20–300)
GLUCOSE SERPL-MCNC: 121 MG/DL (ref 70–110)
HCT VFR BLD AUTO: 29.8 % (ref 37–48.5)
HGB BLD-MCNC: 9.4 G/DL (ref 12–16)
IMM GRANULOCYTES # BLD AUTO: 0.01 K/UL (ref 0–0.04)
MCH RBC QN AUTO: 28.8 PG (ref 27–31)
MCHC RBC AUTO-ENTMCNC: 31.5 G/DL (ref 32–36)
MCV RBC AUTO: 91 FL (ref 82–98)
NEUTROPHILS # BLD AUTO: 3.3 K/UL (ref 1.8–7.7)
PLATELET # BLD AUTO: 223 K/UL (ref 150–450)
PMV BLD AUTO: 10.4 FL (ref 9.2–12.9)
POTASSIUM SERPL-SCNC: 4.8 MMOL/L (ref 3.5–5.1)
PROT SERPL-MCNC: 5.7 G/DL (ref 6–8.4)
RBC # BLD AUTO: 3.26 M/UL (ref 4–5.4)
SODIUM SERPL-SCNC: 142 MMOL/L (ref 136–145)
WBC # BLD AUTO: 6.23 K/UL (ref 3.9–12.7)

## 2022-12-20 PROCEDURE — 1159F PR MEDICATION LIST DOCUMENTED IN MEDICAL RECORD: ICD-10-PCS | Mod: CPTII,S$GLB,, | Performed by: INTERNAL MEDICINE

## 2022-12-20 PROCEDURE — 85027 COMPLETE CBC AUTOMATED: CPT | Performed by: INTERNAL MEDICINE

## 2022-12-20 PROCEDURE — 1101F PT FALLS ASSESS-DOCD LE1/YR: CPT | Mod: CPTII,S$GLB,, | Performed by: INTERNAL MEDICINE

## 2022-12-20 PROCEDURE — 3060F PR POS MICROALBUMINURIA RESULT DOCUMENTED/REVIEW: ICD-10-PCS | Mod: CPTII,S$GLB,, | Performed by: INTERNAL MEDICINE

## 2022-12-20 PROCEDURE — 99213 OFFICE O/P EST LOW 20 MIN: CPT | Mod: S$GLB,,, | Performed by: INTERNAL MEDICINE

## 2022-12-20 PROCEDURE — 99999 PR PBB SHADOW E&M-EST. PATIENT-LVL IV: CPT | Mod: PBBFAC,,, | Performed by: INTERNAL MEDICINE

## 2022-12-20 PROCEDURE — 1159F MED LIST DOCD IN RCRD: CPT | Mod: CPTII,S$GLB,, | Performed by: INTERNAL MEDICINE

## 2022-12-20 PROCEDURE — 3075F SYST BP GE 130 - 139MM HG: CPT | Mod: CPTII,S$GLB,, | Performed by: INTERNAL MEDICINE

## 2022-12-20 PROCEDURE — 4010F ACE/ARB THERAPY RXD/TAKEN: CPT | Mod: CPTII,S$GLB,, | Performed by: INTERNAL MEDICINE

## 2022-12-20 PROCEDURE — 3060F POS MICROALBUMINURIA REV: CPT | Mod: CPTII,S$GLB,, | Performed by: INTERNAL MEDICINE

## 2022-12-20 PROCEDURE — 3075F PR MOST RECENT SYSTOLIC BLOOD PRESS GE 130-139MM HG: ICD-10-PCS | Mod: CPTII,S$GLB,, | Performed by: INTERNAL MEDICINE

## 2022-12-20 PROCEDURE — 82728 ASSAY OF FERRITIN: CPT | Performed by: INTERNAL MEDICINE

## 2022-12-20 PROCEDURE — 3288F PR FALLS RISK ASSESSMENT DOCUMENTED: ICD-10-PCS | Mod: CPTII,S$GLB,, | Performed by: INTERNAL MEDICINE

## 2022-12-20 PROCEDURE — 4010F PR ACE/ARB THEARPY RXD/TAKEN: ICD-10-PCS | Mod: CPTII,S$GLB,, | Performed by: INTERNAL MEDICINE

## 2022-12-20 PROCEDURE — 3078F PR MOST RECENT DIASTOLIC BLOOD PRESSURE < 80 MM HG: ICD-10-PCS | Mod: CPTII,S$GLB,, | Performed by: INTERNAL MEDICINE

## 2022-12-20 PROCEDURE — 80053 COMPREHEN METABOLIC PANEL: CPT | Performed by: INTERNAL MEDICINE

## 2022-12-20 PROCEDURE — 3288F FALL RISK ASSESSMENT DOCD: CPT | Mod: CPTII,S$GLB,, | Performed by: INTERNAL MEDICINE

## 2022-12-20 PROCEDURE — 3066F NEPHROPATHY DOC TX: CPT | Mod: CPTII,S$GLB,, | Performed by: INTERNAL MEDICINE

## 2022-12-20 PROCEDURE — 3066F PR DOCUMENTATION OF TREATMENT FOR NEPHROPATHY: ICD-10-PCS | Mod: CPTII,S$GLB,, | Performed by: INTERNAL MEDICINE

## 2022-12-20 PROCEDURE — 3051F HG A1C>EQUAL 7.0%<8.0%: CPT | Mod: CPTII,S$GLB,, | Performed by: INTERNAL MEDICINE

## 2022-12-20 PROCEDURE — 3051F PR MOST RECENT HEMOGLOBIN A1C LEVEL 7.0 - < 8.0%: ICD-10-PCS | Mod: CPTII,S$GLB,, | Performed by: INTERNAL MEDICINE

## 2022-12-20 PROCEDURE — 99999 PR PBB SHADOW E&M-EST. PATIENT-LVL IV: ICD-10-PCS | Mod: PBBFAC,,, | Performed by: INTERNAL MEDICINE

## 2022-12-20 PROCEDURE — 1101F PR PT FALLS ASSESS DOC 0-1 FALLS W/OUT INJ PAST YR: ICD-10-PCS | Mod: CPTII,S$GLB,, | Performed by: INTERNAL MEDICINE

## 2022-12-20 PROCEDURE — 1126F PR PAIN SEVERITY QUANTIFIED, NO PAIN PRESENT: ICD-10-PCS | Mod: CPTII,S$GLB,, | Performed by: INTERNAL MEDICINE

## 2022-12-20 PROCEDURE — 1126F AMNT PAIN NOTED NONE PRSNT: CPT | Mod: CPTII,S$GLB,, | Performed by: INTERNAL MEDICINE

## 2022-12-20 PROCEDURE — 3078F DIAST BP <80 MM HG: CPT | Mod: CPTII,S$GLB,, | Performed by: INTERNAL MEDICINE

## 2022-12-20 PROCEDURE — 36415 COLL VENOUS BLD VENIPUNCTURE: CPT | Performed by: INTERNAL MEDICINE

## 2022-12-20 PROCEDURE — 99213 PR OFFICE/OUTPT VISIT, EST, LEVL III, 20-29 MIN: ICD-10-PCS | Mod: S$GLB,,, | Performed by: INTERNAL MEDICINE

## 2022-12-20 NOTE — PROGRESS NOTES
Subjective:       Patient ID: Annika Mac is a 72 y.o. female.    Chief Complaint: No chief complaint on file.    Anemia     Mrs. Mac returns today for follow up.  I had last seen her 6 months ago.  Briefly, she is a 70 yo morbidly obese female who comes today for follow up for her longstanding anemia.    Multiple stool samples in the past have been negative for occult blood.   Of note, she had an EGD, colonoscopy, and video capsule swallow 4 years ago.  A tubular adenoma was removed from her colon while her EGD had shown patchy mildly erythematous mucosa without bleeding in the gastric body.  Biopsies were negative for malignancy.  The video capsule swallow was unremarkable although it was not an optimal study.  No labs have been drawn yet today    She submitted 3 stool samples today that were all negative for occult blood  Review of Systems  Overall she feels fair. She again complains of her longstanding arthritic pains involving primarily her knees, her back, and her hips.  She uses a wheelchair for ambulation.  She also again complains of severe constipation.  She denies any anxiety, depression, easy bruising, fevers, chills, night sweats, weight loss, nausea, vomiting, diarrhea, diplopia, blurred vision, epistaxis, hematuria, or headaches.      Objective:      Physical Exam  GENERAL: She is alert, oriented to time, place, person, pleasant, well nourished, in no acute physical distress.  She is able to climb to the examination table with assistance.  VITAL SIGNS: Reviewed.   HEENT: Normal. There are no nasal, oral, lip, gingival, auricular, lid, conjunctival lesions. Pupils are equal, reactive to light and   accommodation and extraocular muscle movements are intact. Mucosae are moist and pink, and there is no thrush.  Maxillary teeth are missing.  NECK: Supple without JVD, adenopathy, or thyromegaly.   LUNGS: Clear to auscultation without wheezing, rales, or rhonchi.   CARDIOVASCULAR: Reveals an S1, S2, no  murmurs, no rubs, no gallops.   ABDOMEN: Obese, soft, nontender, without organomegaly. Bowel sounds are present. A median abdominal scar is seen extending from the umbilicus to the symphysis pubis.   EXTREMITIES: No cyanosis or clubbing. There is 1(+) edema at the ankle level bilaterally.  The left foot is wrapped.  SKIN: Does not have petechiae, rashes, induration, or ecchymoses.   NEUROLOGIC: Motor function is 5/5, DTRs are 0 to 1+ bilaterally, symmetrical, and cranial nerves within normal limits.   LYMPHATIC: There is no cervical, axillary, or supraclavicular adenopathy.       Assessment:       1. Anemia in stage 3 chronic kidney disease      2. Morbid obesity due to excess calories     3. 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 Mac.  I suspect that her anemia is multifactorial.  I asked her to have a CBC, ferritin and CMP checked today.  If her anemia is no worse, we will see her again in 6 months.  Her multiple questions were answered to her satisfaction.    Route Chart for Scheduling    Med Onc Chart Routing      Follow up with physician 6 months.   Follow up with KATIE . Labs today.  See me with same labs 6 months   Infusion scheduling note    Injection scheduling note    Labs CMP, ferritin and CBC   Lab interval:     Imaging    Pharmacy appointment    Other referrals          Therapy Plan Information  None

## 2023-01-05 ENCOUNTER — OFFICE VISIT (OUTPATIENT)
Dept: GASTROENTEROLOGY | Facility: CLINIC | Age: 73
End: 2023-01-05
Payer: MEDICARE

## 2023-01-05 ENCOUNTER — LAB VISIT (OUTPATIENT)
Dept: LAB | Facility: HOSPITAL | Age: 73
End: 2023-01-05
Attending: INTERNAL MEDICINE
Payer: MEDICARE

## 2023-01-05 VITALS
BODY MASS INDEX: 43.4 KG/M2 | HEART RATE: 73 BPM | HEIGHT: 69 IN | DIASTOLIC BLOOD PRESSURE: 70 MMHG | WEIGHT: 293 LBS | SYSTOLIC BLOOD PRESSURE: 133 MMHG

## 2023-01-05 DIAGNOSIS — R13.10 DYSPHAGIA, UNSPECIFIED TYPE: ICD-10-CM

## 2023-01-05 DIAGNOSIS — Z79.4 TYPE 2 DIABETES MELLITUS WITH DIABETIC POLYNEUROPATHY, WITH LONG-TERM CURRENT USE OF INSULIN: ICD-10-CM

## 2023-01-05 DIAGNOSIS — Z86.010 HISTORY OF COLON POLYPS: Primary | ICD-10-CM

## 2023-01-05 DIAGNOSIS — R13.19 ESOPHAGEAL DYSPHAGIA: ICD-10-CM

## 2023-01-05 DIAGNOSIS — E11.42 TYPE 2 DIABETES MELLITUS WITH DIABETIC POLYNEUROPATHY, WITH LONG-TERM CURRENT USE OF INSULIN: ICD-10-CM

## 2023-01-05 LAB
ALBUMIN SERPL BCP-MCNC: 3.6 G/DL (ref 3.5–5.2)
ALP SERPL-CCNC: 124 U/L (ref 55–135)
ALT SERPL W/O P-5'-P-CCNC: 17 U/L (ref 10–44)
ANION GAP SERPL CALC-SCNC: 10 MMOL/L (ref 8–16)
AST SERPL-CCNC: 18 U/L (ref 10–40)
BASOPHILS # BLD AUTO: 0.05 K/UL (ref 0–0.2)
BASOPHILS NFR BLD: 0.7 % (ref 0–1.9)
BILIRUB SERPL-MCNC: 0.4 MG/DL (ref 0.1–1)
BUN SERPL-MCNC: 28 MG/DL (ref 8–23)
CALCIUM SERPL-MCNC: 10 MG/DL (ref 8.7–10.5)
CHLORIDE SERPL-SCNC: 106 MMOL/L (ref 95–110)
CHOLEST SERPL-MCNC: 145 MG/DL (ref 120–199)
CHOLEST/HDLC SERPL: 4 {RATIO} (ref 2–5)
CO2 SERPL-SCNC: 25 MMOL/L (ref 23–29)
CREAT SERPL-MCNC: 1.7 MG/DL (ref 0.5–1.4)
DIFFERENTIAL METHOD: ABNORMAL
EOSINOPHIL # BLD AUTO: 0.4 K/UL (ref 0–0.5)
EOSINOPHIL NFR BLD: 6 % (ref 0–8)
ERYTHROCYTE [DISTWIDTH] IN BLOOD BY AUTOMATED COUNT: 15.3 % (ref 11.5–14.5)
EST. GFR  (NO RACE VARIABLE): 31.5 ML/MIN/1.73 M^2
ESTIMATED AVG GLUCOSE: 174 MG/DL (ref 68–131)
GLUCOSE SERPL-MCNC: 211 MG/DL (ref 70–110)
HBA1C MFR BLD: 7.7 % (ref 4–5.6)
HCT VFR BLD AUTO: 30.8 % (ref 37–48.5)
HDLC SERPL-MCNC: 36 MG/DL (ref 40–75)
HDLC SERPL: 24.8 % (ref 20–50)
HGB BLD-MCNC: 9.8 G/DL (ref 12–16)
IMM GRANULOCYTES # BLD AUTO: 0.02 K/UL (ref 0–0.04)
IMM GRANULOCYTES NFR BLD AUTO: 0.3 % (ref 0–0.5)
LDLC SERPL CALC-MCNC: 78.2 MG/DL (ref 63–159)
LYMPHOCYTES # BLD AUTO: 1.8 K/UL (ref 1–4.8)
LYMPHOCYTES NFR BLD: 25.5 % (ref 18–48)
MCH RBC QN AUTO: 28.9 PG (ref 27–31)
MCHC RBC AUTO-ENTMCNC: 31.8 G/DL (ref 32–36)
MCV RBC AUTO: 91 FL (ref 82–98)
MONOCYTES # BLD AUTO: 0.7 K/UL (ref 0.3–1)
MONOCYTES NFR BLD: 10.5 % (ref 4–15)
NEUTROPHILS # BLD AUTO: 4 K/UL (ref 1.8–7.7)
NEUTROPHILS NFR BLD: 57 % (ref 38–73)
NONHDLC SERPL-MCNC: 109 MG/DL
NRBC BLD-RTO: 0 /100 WBC
PLATELET # BLD AUTO: 273 K/UL (ref 150–450)
PMV BLD AUTO: 11.4 FL (ref 9.2–12.9)
POTASSIUM SERPL-SCNC: 4.5 MMOL/L (ref 3.5–5.1)
PROT SERPL-MCNC: 6.4 G/DL (ref 6–8.4)
RBC # BLD AUTO: 3.39 M/UL (ref 4–5.4)
SODIUM SERPL-SCNC: 141 MMOL/L (ref 136–145)
TRIGL SERPL-MCNC: 154 MG/DL (ref 30–150)
WBC # BLD AUTO: 6.98 K/UL (ref 3.9–12.7)

## 2023-01-05 PROCEDURE — 1101F PR PT FALLS ASSESS DOC 0-1 FALLS W/OUT INJ PAST YR: ICD-10-PCS | Mod: CPTII,S$GLB,, | Performed by: INTERNAL MEDICINE

## 2023-01-05 PROCEDURE — 3075F SYST BP GE 130 - 139MM HG: CPT | Mod: CPTII,S$GLB,, | Performed by: INTERNAL MEDICINE

## 2023-01-05 PROCEDURE — 99999 PR PBB SHADOW E&M-EST. PATIENT-LVL V: ICD-10-PCS | Mod: PBBFAC,,, | Performed by: INTERNAL MEDICINE

## 2023-01-05 PROCEDURE — 99204 PR OFFICE/OUTPT VISIT, NEW, LEVL IV, 45-59 MIN: ICD-10-PCS | Mod: S$GLB,,, | Performed by: INTERNAL MEDICINE

## 2023-01-05 PROCEDURE — 3008F BODY MASS INDEX DOCD: CPT | Mod: CPTII,S$GLB,, | Performed by: INTERNAL MEDICINE

## 2023-01-05 PROCEDURE — 1126F AMNT PAIN NOTED NONE PRSNT: CPT | Mod: CPTII,S$GLB,, | Performed by: INTERNAL MEDICINE

## 2023-01-05 PROCEDURE — 99999 PR PBB SHADOW E&M-EST. PATIENT-LVL V: CPT | Mod: PBBFAC,,, | Performed by: INTERNAL MEDICINE

## 2023-01-05 PROCEDURE — 1101F PT FALLS ASSESS-DOCD LE1/YR: CPT | Mod: CPTII,S$GLB,, | Performed by: INTERNAL MEDICINE

## 2023-01-05 PROCEDURE — 80053 COMPREHEN METABOLIC PANEL: CPT | Performed by: INTERNAL MEDICINE

## 2023-01-05 PROCEDURE — 3288F PR FALLS RISK ASSESSMENT DOCUMENTED: ICD-10-PCS | Mod: CPTII,S$GLB,, | Performed by: INTERNAL MEDICINE

## 2023-01-05 PROCEDURE — 3288F FALL RISK ASSESSMENT DOCD: CPT | Mod: CPTII,S$GLB,, | Performed by: INTERNAL MEDICINE

## 2023-01-05 PROCEDURE — 36415 COLL VENOUS BLD VENIPUNCTURE: CPT | Performed by: INTERNAL MEDICINE

## 2023-01-05 PROCEDURE — 1126F PR PAIN SEVERITY QUANTIFIED, NO PAIN PRESENT: ICD-10-PCS | Mod: CPTII,S$GLB,, | Performed by: INTERNAL MEDICINE

## 2023-01-05 PROCEDURE — 3078F DIAST BP <80 MM HG: CPT | Mod: CPTII,S$GLB,, | Performed by: INTERNAL MEDICINE

## 2023-01-05 PROCEDURE — 3008F PR BODY MASS INDEX (BMI) DOCUMENTED: ICD-10-PCS | Mod: CPTII,S$GLB,, | Performed by: INTERNAL MEDICINE

## 2023-01-05 PROCEDURE — 83036 HEMOGLOBIN GLYCOSYLATED A1C: CPT | Performed by: INTERNAL MEDICINE

## 2023-01-05 PROCEDURE — 3078F PR MOST RECENT DIASTOLIC BLOOD PRESSURE < 80 MM HG: ICD-10-PCS | Mod: CPTII,S$GLB,, | Performed by: INTERNAL MEDICINE

## 2023-01-05 PROCEDURE — 1159F MED LIST DOCD IN RCRD: CPT | Mod: CPTII,S$GLB,, | Performed by: INTERNAL MEDICINE

## 2023-01-05 PROCEDURE — 1159F PR MEDICATION LIST DOCUMENTED IN MEDICAL RECORD: ICD-10-PCS | Mod: CPTII,S$GLB,, | Performed by: INTERNAL MEDICINE

## 2023-01-05 PROCEDURE — 3075F PR MOST RECENT SYSTOLIC BLOOD PRESS GE 130-139MM HG: ICD-10-PCS | Mod: CPTII,S$GLB,, | Performed by: INTERNAL MEDICINE

## 2023-01-05 PROCEDURE — 80061 LIPID PANEL: CPT | Performed by: INTERNAL MEDICINE

## 2023-01-05 PROCEDURE — 99204 OFFICE O/P NEW MOD 45 MIN: CPT | Mod: S$GLB,,, | Performed by: INTERNAL MEDICINE

## 2023-01-05 PROCEDURE — 85025 COMPLETE CBC W/AUTO DIFF WBC: CPT | Performed by: INTERNAL MEDICINE

## 2023-01-05 RX ORDER — LACTULOSE 10 G/15ML
20 SOLUTION ORAL 2 TIMES DAILY PRN
Qty: 1800 ML | Refills: 2 | Status: SHIPPED | OUTPATIENT
Start: 2023-01-05 | End: 2023-06-15

## 2023-01-05 NOTE — PROGRESS NOTES
Reason for visit: Constipation    HPI:  is a 72 year old lady with constipation. Medical history includes Obesity, DM2, CKD, OA, RUFINA, Prior stroke, PAD and chronic anemia with recent stool tests negative for occult blood. Also has occasional feeling of solid food slowing down the esophagus. No trouble with liquids. Denies any odynophagia, nausea, vomiting, heartburn or acid regurgitation. No abdominal pains, changes in bowel pattern (chronic constipation), blood/ mucus in stool or unintentional weight loss. Used to have daily BMs, but now for a year she has BMs every other day with hard stools and straining. Has tried Miralax, other stool softeners and other OTC laxatives in the past. Her hemorrhoids act up at times. No melena or maroon stools. No recent changes in diet or medications. No family history of IBD, Celiac disease or GI malignancy. No regular NSAIDs, alcohol or tobacco use. No recent antibiotic use, travels or sick contacts. No prior history of C.diff. EGD and Colonoscopy in 2018 revealed a small tubular adenoma and focal gastric intestinal metaplasia. Prior abdominal surgeries include 2 C section.    Past medical, surgical, social and family history reviewed in epic    Medication allergies reviewed in epic    Review of systems:    Constitutional:  No fever, no chills, no weight loss, appetite is normal  Eyes:  No visual changes or red eyes  ENT:  No odynophagia or hoarseness of voice  Cardiovascular:  No angina or palpitation  Respiratory:  No shortness breath or wheezing  Genitourinary:  No dysuria or frequency  Musculoskeletal:  No myalgias; has arthritic pains involving primarily her knees, her back, and her hips.  She uses a wheelchair for ambulation.   Skin:  No pruritus or eczema  Neurologic:  No headache or seizures  Psychiatric:  No anxiety depression  Gastrointestinal:  See HPI    Physical exam:  Vitals see epic, awake, alert, oriented x3 in no acute distress    Neck:  Supple, no  carotid bruit, no cervical adenopathy  Abdomen:  Obese, soft, nontender, nondistended, no masses palpable, no hepatosplenomegaly detected, bowel sounds are normal, no ascites clinically detectable  Eyes:  Conjunctivae anicteric, not injected  ENT:  Oral mucosa moist  Cardiovascular:  S1, S2 normal, no murmurs, no gallops, no abdominal bruits heard  Respiratory:  Bilateral air entry equal, no rhonchi, no crackles, normal effort  Skin:  No palmar erythema or spider angiomata  Neurologic:  No asterixis or tremors  Psychiatric:  Affect appropriate, proper judgment, proper insight, oriented to place and time  Lower extremities:  Bilateral pitting pedal edema    Recent labs and prior endoscopy reports reviewed.      Impression: Dysphagia to solids, History of colon polyp, worsening constipation    Recommendations:     Schedule EGD and Colonoscopy  Lactulose 20 g po bid prn.

## 2023-01-05 NOTE — PROGRESS NOTES
01/10/22 1117   Hyperbaric Pre-Inspection   Mattress cleaned prior to treatment Yes   2 Patient Identifiers Verified Yes   For Inpatients: Verify correct ID band/correct chart Yes   Consent Obtained Yes   Cotton Gown Yes   Patient voided Yes   Drainage Bags Emptied Not applicable   Patient Pregnant No   Glasses, Jewelry, Makeup, etc. Removed Yes   Hard contacts removed Yes   Dentures, Hearing Aid, Prosthetic Devices Removed Yes   Touch hair to check for hair spray Yes   Cold/Flu Symptoms No   Diabetic Patient Eaten Yes   Medications Given No   Fears and apprehensions verbalized No   Ground Wire Secured Yes   HBO Treatment Course Details   Treatment Course Number 1   Total Treatments Ordered 20   Ordering Provider Dr. Dobson   Indications Diabetic wounds of lower extremities;Chronic refractory osteomyelitis   HBO Treatment Start Date 12/15/22   HBO Treatment Details   Treatment Number 14   Inpatient Visit No   Total Treatment Length Calc (minutes) 107   Chamber Type Monoplace   Chamber # 4   HBO Dive Log # [id number]   Treatment Protocol Other Treatment Protocol (enter in treatment notes)  (2.0 JAYDA x90-120 minutes w/ 100% oxygen and no air breaks)   Treatment Details   Dive Rate Down 1.5 psi/minute   Dive Rate Up 2.0 psi/minute   Compress Begins 1.0 JAYDA   Clock Time 1000   Tx Pressure Reached 2.0 JAYDA   Clock Time 1010   Decompress Begins 2.0 JAYDA   Clock Time 1140   Decompress Ends 1.0 JAYDA   Clock Time 1147   Pre HBO Vital Signs   /62   Pulse 69   Resp 16   Temp 97.2 °F (36.2 °C)   Blood Glucose 260   Glucose Meter # HBO   Pain Level 0   Post HBO Vital Signs   /60   Pulse 70   Resp 16   Temp 97.2 °F (36.2 °C)   Blood Glucose 198   Glucose Meter # HBO   Pain Level 0   Ear Evaluation   Left Teed Scale Pre Grade 0   Left Teed Scale Post Grade 0   Right Teed Scale Pre Grade 0   Right Teed Scale Post Grade 0   Physician Supervision  I provided direct supervision and was immediately available to furnish assistance  Contacted pt- scheduled appointment with Dr. Prater and refilled rx for Simbrinza per pt.    ----- Message from Grazyna Burroughs sent at 1/4/2023  5:04 PM CST -----  Regarding: FW: Appt and Prescription    ----- Message -----  From: Fauzia Carrasco  Sent: 1/4/2023   9:29 AM CST  To: [facility] Staff  Subject: Appt and Prescription                            Pt called to f/u on message sent yesterday to schedule appt and prescription change.     Requesting call back: [phone]       and direction throughout the performance of the procedure.    Patient tolerated treatment well.  Continue present treatment plan.        Emergency Response Team  A trained emergency response team and emergency services were available throughout procedure.        ICD-10-CM ICD-9-CM   1. Diabetic foot ulcer with osteomyelitis  E11.621 250.80    E11.69 707.15    L97.509 730.27    M86.9 731.8   2. Osteomyelitis, chronic, ankle or foot, left  M86.672 730.17   3. Ulcer of left foot, with fat layer exposed  L97.522 707.15

## 2023-01-12 ENCOUNTER — TELEPHONE (OUTPATIENT)
Dept: INTERNAL MEDICINE | Facility: CLINIC | Age: 73
End: 2023-01-12
Payer: MEDICARE

## 2023-01-12 ENCOUNTER — OFFICE VISIT (OUTPATIENT)
Dept: URGENT CARE | Facility: CLINIC | Age: 73
End: 2023-01-12
Payer: MEDICARE

## 2023-01-12 ENCOUNTER — OFFICE VISIT (OUTPATIENT)
Dept: DERMATOLOGY | Facility: CLINIC | Age: 73
End: 2023-01-12
Payer: MEDICARE

## 2023-01-12 VITALS
TEMPERATURE: 99 F | WEIGHT: 293 LBS | SYSTOLIC BLOOD PRESSURE: 141 MMHG | DIASTOLIC BLOOD PRESSURE: 65 MMHG | OXYGEN SATURATION: 99 % | BODY MASS INDEX: 43.4 KG/M2 | RESPIRATION RATE: 18 BRPM | HEIGHT: 69 IN | HEART RATE: 72 BPM

## 2023-01-12 DIAGNOSIS — R03.0 ELEVATED BLOOD PRESSURE READING: ICD-10-CM

## 2023-01-12 DIAGNOSIS — L64.9 ANDROGENIC ALOPECIA: ICD-10-CM

## 2023-01-12 DIAGNOSIS — L65.8 TRACTION ALOPECIA: Primary | ICD-10-CM

## 2023-01-12 DIAGNOSIS — K13.79 ACUTE PAIN OF MOUTH: Primary | ICD-10-CM

## 2023-01-12 DIAGNOSIS — K12.1 DENTURE SORE MOUTH: ICD-10-CM

## 2023-01-12 DIAGNOSIS — D23.9 DERMATOFIBROMA: ICD-10-CM

## 2023-01-12 PROCEDURE — 1159F PR MEDICATION LIST DOCUMENTED IN MEDICAL RECORD: ICD-10-PCS | Mod: CPTII,S$GLB,, | Performed by: PHYSICIAN ASSISTANT

## 2023-01-12 PROCEDURE — 1159F PR MEDICATION LIST DOCUMENTED IN MEDICAL RECORD: ICD-10-PCS | Mod: CPTII,S$GLB,, | Performed by: DERMATOLOGY

## 2023-01-12 PROCEDURE — 3066F PR DOCUMENTATION OF TREATMENT FOR NEPHROPATHY: ICD-10-PCS | Mod: CPTII,S$GLB,, | Performed by: DERMATOLOGY

## 2023-01-12 PROCEDURE — 3066F PR DOCUMENTATION OF TREATMENT FOR NEPHROPATHY: ICD-10-PCS | Mod: CPTII,S$GLB,, | Performed by: PHYSICIAN ASSISTANT

## 2023-01-12 PROCEDURE — 1125F PR PAIN SEVERITY QUANTIFIED, PAIN PRESENT: ICD-10-PCS | Mod: CPTII,S$GLB,, | Performed by: PHYSICIAN ASSISTANT

## 2023-01-12 PROCEDURE — 1101F PR PT FALLS ASSESS DOC 0-1 FALLS W/OUT INJ PAST YR: ICD-10-PCS | Mod: CPTII,S$GLB,, | Performed by: DERMATOLOGY

## 2023-01-12 PROCEDURE — 1101F PT FALLS ASSESS-DOCD LE1/YR: CPT | Mod: CPTII,S$GLB,, | Performed by: DERMATOLOGY

## 2023-01-12 PROCEDURE — 99999 PR PBB SHADOW E&M-EST. PATIENT-LVL IV: ICD-10-PCS | Mod: PBBFAC,,, | Performed by: DERMATOLOGY

## 2023-01-12 PROCEDURE — 99214 PR OFFICE/OUTPT VISIT, EST, LEVL IV, 30-39 MIN: ICD-10-PCS | Mod: 25,S$GLB,, | Performed by: DERMATOLOGY

## 2023-01-12 PROCEDURE — 4010F PR ACE/ARB THEARPY RXD/TAKEN: ICD-10-PCS | Mod: CPTII,S$GLB,, | Performed by: DERMATOLOGY

## 2023-01-12 PROCEDURE — 3060F POS MICROALBUMINURIA REV: CPT | Mod: CPTII,S$GLB,, | Performed by: PHYSICIAN ASSISTANT

## 2023-01-12 PROCEDURE — 99213 OFFICE O/P EST LOW 20 MIN: CPT | Mod: S$GLB,,, | Performed by: PHYSICIAN ASSISTANT

## 2023-01-12 PROCEDURE — 3051F PR MOST RECENT HEMOGLOBIN A1C LEVEL 7.0 - < 8.0%: ICD-10-PCS | Mod: CPTII,S$GLB,, | Performed by: PHYSICIAN ASSISTANT

## 2023-01-12 PROCEDURE — 3288F FALL RISK ASSESSMENT DOCD: CPT | Mod: CPTII,S$GLB,, | Performed by: DERMATOLOGY

## 2023-01-12 PROCEDURE — 3066F NEPHROPATHY DOC TX: CPT | Mod: CPTII,S$GLB,, | Performed by: PHYSICIAN ASSISTANT

## 2023-01-12 PROCEDURE — 99999 PR PBB SHADOW E&M-EST. PATIENT-LVL IV: CPT | Mod: PBBFAC,,, | Performed by: DERMATOLOGY

## 2023-01-12 PROCEDURE — 3078F DIAST BP <80 MM HG: CPT | Mod: CPTII,S$GLB,, | Performed by: PHYSICIAN ASSISTANT

## 2023-01-12 PROCEDURE — 3078F PR MOST RECENT DIASTOLIC BLOOD PRESSURE < 80 MM HG: ICD-10-PCS | Mod: CPTII,S$GLB,, | Performed by: PHYSICIAN ASSISTANT

## 2023-01-12 PROCEDURE — 3066F NEPHROPATHY DOC TX: CPT | Mod: CPTII,S$GLB,, | Performed by: DERMATOLOGY

## 2023-01-12 PROCEDURE — 4010F ACE/ARB THERAPY RXD/TAKEN: CPT | Mod: CPTII,S$GLB,, | Performed by: DERMATOLOGY

## 2023-01-12 PROCEDURE — 1126F AMNT PAIN NOTED NONE PRSNT: CPT | Mod: CPTII,S$GLB,, | Performed by: DERMATOLOGY

## 2023-01-12 PROCEDURE — 1160F RVW MEDS BY RX/DR IN RCRD: CPT | Mod: CPTII,S$GLB,, | Performed by: PHYSICIAN ASSISTANT

## 2023-01-12 PROCEDURE — 11900 INJECT SKIN LESIONS </W 7: CPT | Mod: S$GLB,,, | Performed by: DERMATOLOGY

## 2023-01-12 PROCEDURE — 1159F MED LIST DOCD IN RCRD: CPT | Mod: CPTII,S$GLB,, | Performed by: PHYSICIAN ASSISTANT

## 2023-01-12 PROCEDURE — 3051F HG A1C>EQUAL 7.0%<8.0%: CPT | Mod: CPTII,S$GLB,, | Performed by: PHYSICIAN ASSISTANT

## 2023-01-12 PROCEDURE — 3008F BODY MASS INDEX DOCD: CPT | Mod: CPTII,S$GLB,, | Performed by: PHYSICIAN ASSISTANT

## 2023-01-12 PROCEDURE — 3060F PR POS MICROALBUMINURIA RESULT DOCUMENTED/REVIEW: ICD-10-PCS | Mod: CPTII,S$GLB,, | Performed by: DERMATOLOGY

## 2023-01-12 PROCEDURE — 11900 PR INJECTION INTO SKIN LESIONS, UP TO 7: ICD-10-PCS | Mod: S$GLB,,, | Performed by: DERMATOLOGY

## 2023-01-12 PROCEDURE — 3051F HG A1C>EQUAL 7.0%<8.0%: CPT | Mod: CPTII,S$GLB,, | Performed by: DERMATOLOGY

## 2023-01-12 PROCEDURE — 1126F PR PAIN SEVERITY QUANTIFIED, NO PAIN PRESENT: ICD-10-PCS | Mod: CPTII,S$GLB,, | Performed by: DERMATOLOGY

## 2023-01-12 PROCEDURE — 1159F MED LIST DOCD IN RCRD: CPT | Mod: CPTII,S$GLB,, | Performed by: DERMATOLOGY

## 2023-01-12 PROCEDURE — 3077F PR MOST RECENT SYSTOLIC BLOOD PRESSURE >= 140 MM HG: ICD-10-PCS | Mod: CPTII,S$GLB,, | Performed by: PHYSICIAN ASSISTANT

## 2023-01-12 PROCEDURE — 4010F PR ACE/ARB THEARPY RXD/TAKEN: ICD-10-PCS | Mod: CPTII,S$GLB,, | Performed by: PHYSICIAN ASSISTANT

## 2023-01-12 PROCEDURE — 1160F PR REVIEW ALL MEDS BY PRESCRIBER/CLIN PHARMACIST DOCUMENTED: ICD-10-PCS | Mod: CPTII,S$GLB,, | Performed by: PHYSICIAN ASSISTANT

## 2023-01-12 PROCEDURE — 99213 PR OFFICE/OUTPT VISIT, EST, LEVL III, 20-29 MIN: ICD-10-PCS | Mod: S$GLB,,, | Performed by: PHYSICIAN ASSISTANT

## 2023-01-12 PROCEDURE — 3060F PR POS MICROALBUMINURIA RESULT DOCUMENTED/REVIEW: ICD-10-PCS | Mod: CPTII,S$GLB,, | Performed by: PHYSICIAN ASSISTANT

## 2023-01-12 PROCEDURE — 3060F POS MICROALBUMINURIA REV: CPT | Mod: CPTII,S$GLB,, | Performed by: DERMATOLOGY

## 2023-01-12 PROCEDURE — 3051F PR MOST RECENT HEMOGLOBIN A1C LEVEL 7.0 - < 8.0%: ICD-10-PCS | Mod: CPTII,S$GLB,, | Performed by: DERMATOLOGY

## 2023-01-12 PROCEDURE — 3288F PR FALLS RISK ASSESSMENT DOCUMENTED: ICD-10-PCS | Mod: CPTII,S$GLB,, | Performed by: DERMATOLOGY

## 2023-01-12 PROCEDURE — 3008F PR BODY MASS INDEX (BMI) DOCUMENTED: ICD-10-PCS | Mod: CPTII,S$GLB,, | Performed by: PHYSICIAN ASSISTANT

## 2023-01-12 PROCEDURE — 99214 OFFICE O/P EST MOD 30 MIN: CPT | Mod: 25,S$GLB,, | Performed by: DERMATOLOGY

## 2023-01-12 PROCEDURE — 4010F ACE/ARB THERAPY RXD/TAKEN: CPT | Mod: CPTII,S$GLB,, | Performed by: PHYSICIAN ASSISTANT

## 2023-01-12 PROCEDURE — 1125F AMNT PAIN NOTED PAIN PRSNT: CPT | Mod: CPTII,S$GLB,, | Performed by: PHYSICIAN ASSISTANT

## 2023-01-12 PROCEDURE — 3077F SYST BP >= 140 MM HG: CPT | Mod: CPTII,S$GLB,, | Performed by: PHYSICIAN ASSISTANT

## 2023-01-12 RX ORDER — KETOCONAZOLE 20 MG/ML
SHAMPOO, SUSPENSION TOPICAL
Qty: 120 ML | Status: CANCELLED | OUTPATIENT
Start: 2023-01-12

## 2023-01-12 RX ORDER — KETOCONAZOLE 20 MG/ML
SHAMPOO, SUSPENSION TOPICAL
Qty: 120 ML | Refills: 5 | Status: SHIPPED | OUTPATIENT
Start: 2023-01-12

## 2023-01-12 RX ORDER — ACETAMINOPHEN 500 MG
500 TABLET ORAL
Status: COMPLETED | OUTPATIENT
Start: 2023-01-12 | End: 2023-01-12

## 2023-01-12 RX ORDER — FINASTERIDE 5 MG/1
5 TABLET, FILM COATED ORAL DAILY
Qty: 30 TABLET | Refills: 11 | Status: SHIPPED | OUTPATIENT
Start: 2023-01-12 | End: 2024-01-12

## 2023-01-12 RX ADMIN — Medication 500 MG: at 04:01

## 2023-01-12 NOTE — PROGRESS NOTES
"Subjective:       Patient ID: Annika Mac is a 72 y.o. female.    Vitals:  height is 5' 9" (1.753 m) and weight is 134.3 kg (296 lb). Her tympanic temperature is 98.9 °F (37.2 °C). Her blood pressure is 141/65 (abnormal) and her pulse is 72. Her respiration is 18 and oxygen saturation is 99%.     Chief Complaint: No chief complaint on file.    Patient presents with pain in the roof of her mouth after eating hard cookies without her dentures after her blood sugar went down Sunday. OTC Asprin, rinsed with warm salt water and diluted peroxide.    Oral Pain   This is a new problem. The current episode started in the past 7 days (4). The pain is at a severity of 9/10. Associated symptoms include difficulty swallowing. Pertinent negatives include no facial pain, fever, oral bleeding, sinus pressure or thermal sensitivity. She has tried acetaminophen for the symptoms.     Constitution: Negative for fever.   HENT:  Positive for dental problem and mouth sores. Negative for sinus pressure.      Objective:      Vitals:    01/12/23 1600   BP: (!) 141/65   Pulse: 72   Resp: 18   Temp: 98.9 °F (37.2 °C)   TempSrc: Tympanic   SpO2: 99%   Weight: 134.3 kg (296 lb)   Height: 5' 9" (1.753 m)       Physical Exam   Constitutional: She is oriented to person, place, and time. She appears well-developed. She is cooperative.  Non-toxic appearance. She does not appear ill. No distress. obesity  HENT:   Head: Normocephalic and atraumatic.   Ears:   Right Ear: Hearing, tympanic membrane, external ear and ear canal normal.   Left Ear: Hearing, tympanic membrane, external ear and ear canal normal.   Nose: Nose normal. No mucosal edema, rhinorrhea or nasal deformity. No epistaxis. Right sinus exhibits no maxillary sinus tenderness and no frontal sinus tenderness. Left sinus exhibits no maxillary sinus tenderness and no frontal sinus tenderness.   Mouth/Throat: Uvula is midline, oropharynx is clear and moist and mucous membranes are normal. She " has dentures. Oral lesions present. No trismus in the jaw. No uvula swelling. No posterior oropharyngeal erythema or cobblestoning.   Inner mouth irritation       Comments: Inner mouth irritation   Eyes: Conjunctivae and lids are normal. Right eye exhibits no discharge. Left eye exhibits no discharge. No scleral icterus.   Neck: Trachea normal and phonation normal. Neck supple.   Cardiovascular: Normal rate, regular rhythm, normal heart sounds and normal pulses.   Pulmonary/Chest: Effort normal and breath sounds normal. No respiratory distress.   Abdominal: Normal appearance and bowel sounds are normal. She exhibits no distension and no mass. Soft. There is no abdominal tenderness.   Musculoskeletal: Normal range of motion.         General: No deformity. Normal range of motion.   Neurological: She is alert and oriented to person, place, and time. She exhibits normal muscle tone. Coordination normal.   Skin: Skin is warm, dry, intact, not diaphoretic and not pale.   Psychiatric: Her speech is normal and behavior is normal. Judgment and thought content normal.   Nursing note and vitals reviewed.      Assessment:       1. Acute pain of mouth    2. Denture sore mouth    3. Elevated blood pressure reading          Plan:         Acute pain of mouth  -     acetaminophen tablet 500 mg    Denture sore mouth    Elevated blood pressure reading         Patient Instructions   DENTAL ISSUE:  Soft diet until you or feeling better or your symptoms resolve.     OVER-THE-COUNTER TYLENOL AS NEEDED FOR DISCOMFORT.    Please follow up with a dentist as soon as possible for reevaluation/treatment of symptoms;  if you do not have one, you can call the number on back of your insurance card for assistance.    You can find a dentist that accepts Medicaid at [email or web address]    Please go to the ER for any worsening symptoms including: fever, facial swelling/redness, difficulty opening your mouth/breathing/swallowing or  new symptoms or other concerns.    ELEVATED BLOOD PRESSURE READING:    - Your blood pressure was noted to be elevated in clinic today.-- please keep an eye on blood pressures.  - Record blood pressures and follow up with primary care doctor if blood pressures continue to be elevated.  - Here are a few generalized recommended lifestyle modifications proven to lower BPs--DASH diet, exercise, weight loss, reducing stress.  *Of note: above recommendations are generalized conservative lifestyle modifications that include but are not limited to above list.   Please do not attempt any of the above recommendations if they do not pertain to you or should cause overall detriment to your health.   Please call the clinic or your PCP with any questions you may have in regards to BP and lifestyle modifications.          - You must understand that you have received an Urgent Care treatment only and that you may be released before all of your medical problems are known or treated.   - You, the patient, will arrange for follow up care as instructed with your primary care provider or recommended specialist.   - If your condition worsens or fails to improve we recommend that you receive another evaluation at the ER immediately or contact your PCP to discuss your concerns, or return here.   - Please do not drive or make any important decisions for 24 hours if you have received any pain medications, sedatives or mood altering drugs during your visit.    Disclaimer: This document was drafted with the use of a voice recognition device and is likely to have sound alike errors.

## 2023-01-12 NOTE — TELEPHONE ENCOUNTER
----- Message from Colleen Daniels sent at 1/12/2023  9:33 AM CST -----  Contact: Annika   [email or web address]     Patient is calling for Medical Advice regarding: infection in roof of mouth     How long has patient had these symptoms:since Monday     Pharmacy name and phone#: [facility] in [place]     Would like response via [employer]art: call back     Comments:

## 2023-01-12 NOTE — PROGRESS NOTES
Subjective:       Patient ID:  Annika Mac is a 72 y.o. female who presents for   Chief Complaint   Patient presents with    Hair Loss     Hair Loss - Initial  Affected locations: scalp  Duration: 3 years  Signs / symptoms: dryness  Severity: mild  Timing: constant  Aggravated by: nothing  Relieving factors/Treatments tried: nothing  Improvement on treatment: no relief    Lesion - Initial  Affected locations: left lower leg  Duration: 6 months  Signs / symptoms: rough  Severity: mild  Timing: constant  Aggravated by: nothing  Relieving factors/Treatments tried: nothing    Review of Systems   Constitutional:  Negative for fever, chills, fatigue and malaise.   Hematologic/Lymphatic: Does not bruise/bleed easily.      Objective:    Physical Exam   Constitutional: She appears well-developed and well-nourished. No distress.   Neurological: She is alert and oriented to person, place, and time. She is not disoriented.   Psychiatric: She has a normal mood and affect.   Skin:   Areas Examined (abnormalities noted in diagram):   Scalp / Hair Palpated and Inspected  LLE Inspection Performed                 Diagram Legend     Erythematous scaling macule/papule c/w actinic keratosis       Vascular papule c/w angioma      Pigmented verrucoid papule/plaque c/w seborrheic keratosis      Yellow umbilicated papule c/w sebaceous hyperplasia      Irregularly shaped tan macule c/w lentigo     1-2 mm smooth white papules consistent with Milia      Movable subcutaneous cyst with punctum c/w epidermal inclusion cyst      Subcutaneous movable cyst c/w pilar cyst      Firm pink to brown papule c/w dermatofibroma      Pedunculated fleshy papule(s) c/w skin tag(s)      Evenly pigmented macule c/w junctional nevus     Mildly variegated pigmented, slightly irregular-bordered macule c/w mildly atypical nevus      Flesh colored to evenly pigmented papule c/w intradermal nevus       Pink pearly papule/plaque c/w basal cell carcinoma       Erythematous hyperkeratotic cursted plaque c/w SCC      Surgical scar with no sign of skin cancer recurrence      Open and closed comedones      Inflammatory papules and pustules      Verrucoid papule consistent consistent with wart     Erythematous eczematous patches and plaques     Dystrophic onycholytic nail with subungual debris c/w onychomycosis     Umbilicated papule    Erythematous-base heme-crusted tan verrucoid plaque consistent with inflamed seborrheic keratosis     Erythematous Silvery Scaling Plaque c/w Psoriasis     See annotation                Assessment / Plan:        Traction alopecia  -     ID [id number] ACETONIDE INJ PER 10MG  -     triamcinolone acetonide injection 10 mg  -     ID INJECTION INTO SKIN LESIONS, UP TO 7  Intralesional Kenalog 5mg/cc (2 cc total) injected into 6 lesions on the frontal temporal today after obtaining verbal consent including risk of surrounding hypopigmentation. Patient tolerated procedure well.  Units: 1.0  NDC for Kenalog 10mg/cc:  1580-0447-83  Androgenic alopecia  - start keto shampoo let sit 5 min then rinse off  -     finasteride (PROSCAR) 5 mg tablet; Take 1 tablet (5 mg total) by mouth once daily.  Dispense: 30 tablet; Refill: 11  Discussed benefits and risk of Propecia including but not limited sexual dysfunction (approx 1%). There is also a not-well understood syndrome that is very rare: Post-finasteride syndrome that can result in irreversible sexual dysfunction including low libido.     Start Men's otc Rogaine once daily to scalp 5% solution  Use vaseline around hairline to prevent excessive hair growth  Encourage natural (chemical and heat-free) hair styles and limited styling products.  Counseling done on chronic nature of hair loss and that at least a 6 month trial must be done before stopping minoxidil  Pictures taken of hair loss today will compare through visit hx       Dermatofibroma  This is a benign scar-like lesion secondary to minor trauma. No  treatment required.     F/u 2-3 mo for scalp injections         No follow-ups on file.

## 2023-01-12 NOTE — PATIENT INSTRUCTIONS
Start Men's otc Rogaine once daily to scalp 5% solution  Use vaseline around hairline to prevent excessive hair growth  Encourage natural (chemical and heat-free) hair styles and limited styling products.  Counseling done on chronic nature of hair loss and that at least a 6 month trial must be done before stopping minoxidil  Pictures taken of hair loss today will compare through visit hx       Discussed benefits and risk of Propecia including but not limited sexual dysfunction (approx 1%). There is also a not-well understood syndrome that is very rare: Post-finasteride syndrome that can result in irreversible sexual dysfunction including low libido.

## 2023-01-12 NOTE — PATIENT INSTRUCTIONS
DENTAL ISSUE:  Soft diet until you or feeling better or your symptoms resolve.     OVER-THE-COUNTER TYLENOL AS NEEDED FOR DISCOMFORT.    Please follow up with a dentist as soon as possible for reevaluation/treatment of symptoms;  if you do not have one, you can call the number on back of your insurance card for assistance.    You can find a dentist that accepts Medicaid at [email or web address]    Please go to the ER for any worsening symptoms including: fever, facial swelling/redness, difficulty opening your mouth/breathing/swallowing or new symptoms or other concerns.    ELEVATED BLOOD PRESSURE READING:    - Your blood pressure was noted to be elevated in clinic today.-- please keep an eye on blood pressures.  - Record blood pressures and follow up with primary care doctor if blood pressures continue to be elevated.  - Here are a few generalized recommended lifestyle modifications proven to lower BPs--DASH diet, exercise, weight loss, reducing stress.  *Of note: above recommendations are generalized conservative lifestyle modifications that include but are not limited to above list.   Please do not attempt any of the above recommendations if they do not pertain to you or should cause overall detriment to your health.   Please call the clinic or your PCP with any questions you may have in regards to BP and lifestyle modifications.          - You must understand that you have received an Urgent Care treatment only and that you may be released before all of your medical problems are known or treated.   - You, the patient, will arrange for follow up care as instructed with your primary care provider or recommended specialist.   - If your condition worsens or fails to improve we recommend that you receive another evaluation at the ER immediately or contact your PCP to discuss your concerns, or return here.   - Please do not drive or make any important decisions for 24 hours if you have received any pain  medications, sedatives or mood altering drugs during your visit.    Disclaimer: This document was drafted with the use of a voice recognition device and is likely to have sound alike errors.

## 2023-01-12 NOTE — TELEPHONE ENCOUNTER
Pt states she has a rash at the roof of her mouth. Pt states she ate cookies and cut the roof of her mouth. Pt gargle with warm salt and water or peroxide pt states she is on her way to visit urgent care she cant wait to see her PCP pt states she was informed PCP had no openings until March.

## 2023-01-15 ENCOUNTER — TELEPHONE (OUTPATIENT)
Dept: URGENT CARE | Facility: CLINIC | Age: 73
End: 2023-01-15
Payer: MEDICARE

## 2023-01-17 ENCOUNTER — DOCUMENTATION ONLY (OUTPATIENT)
Dept: REHABILITATION | Facility: HOSPITAL | Age: 73
End: 2023-01-17
Payer: MEDICARE

## 2023-01-17 PROBLEM — R29.898 DECREASED STRENGTH OF LOWER EXTREMITY: Status: RESOLVED | Noted: 2021-04-08 | Resolved: 2023-01-17

## 2023-01-17 PROBLEM — G89.29 CHRONIC PAIN OF RIGHT KNEE: Status: RESOLVED | Noted: 2022-11-16 | Resolved: 2023-01-17

## 2023-01-17 PROBLEM — M25.561 CHRONIC PAIN OF RIGHT KNEE: Status: RESOLVED | Noted: 2022-11-16 | Resolved: 2023-01-17

## 2023-01-17 NOTE — PROGRESS NOTES
[facility]   Discharge Note    Name: Annika Mac  Clinic Number: [id number]    Therapy Diagnosis:        Encounter Diagnoses   Name Primary?    Decreased strength of lower extremity Yes    Chronic pain of right knee        Physician: Homero Leggett PA*    Physician Orders: PT Eval and Treat   Medical Diagnosis from Referral:   M17.11 (ICD-10-CM) - Primary osteoarthritis of right knee   M25.561 (ICD-10-CM) - Acute pain of right knee   Evaluation Date: 11/9/2022    Date of Last visit: 12/19/2022  Total Visits Received: 7    ASSESSMENT    Discharge reason: Patient has not attended therapy since 12/19/2022. Par has called numerous occasions with patient not returning calls to schedule more visits.     Discharge FOTO Score: Not given due to pt discharging with reason unknown     Goals:   Short Term Goals:  5 weeks  Patient will demonstrate independence with HEP in order to progress toward functional independence- MET 12/13/2022  Pt will demonstrate improved pain by reports of less than or equal to 7/10 worst pain on the verbal rating scale in order to progress toward maximal functional ability and improve QOL.- progressing, not met  Pt will be independent with safety and use of rollator for increased community ambulation and decreased overall energy expenditure with walking activities. - MET 12/13/2022  Pt will improve 30 second sit to stand to greater than or equal too 7 repetitions for improved overall functional mobility. - MET 12/13/2022     Long Term Goals: 8 weeks  Patient will demonstrate improved function as indicated by a functional limitation score of 47% on the FOTO.- progressing, not met  Pt will demonstrate improved pain by reports of less than or equal to 5/10 worst pain on the verbal rating scale in order to progress toward maximal functional ability and improve QOL.- progressing, not met  Pt will improve bilateral lower extremity strength to 5/5 for improved strength needed  for increased independence with functional mobility. - progressing, not met  Pt will improve 30 second sit to stand to greater than or equal too 12 repetitions for improved overall functional mobility. - progressing, not met  pt will exhibit equal knee flexion range of motion for improved range needed to perform functional tasks. - progressing, not met    PLAN   This patient is discharged from Physical Therapy    Ashley Pressley, PT, DPT

## 2023-02-05 NOTE — PROGRESS NOTES
Subjective:      Chief Complaint: Type 2 diabetes mellitus    History of Present Illness  72 y.o. female with type 2 diabetes mellitus with neuropathy, nephropathy, and retinopathy, obesity, peripheral arterial disease, asthma/COPD, GERD, and RUFINA who presents for follow-up.    - Occupation: Retired     - Last seen by Dr. Love on 2022 at which time her Tresiba 45 daily was continued and her Humalog 10/11/11 with moderate correction was continued and her Ozempic was changed to Trulicity. Considered starting SGLT2 at that time but was held off in favor of increasing GLP 1.    - Interval history: No issues with N/V on current dose of Trulicity. Now off Dexcom due to problems getting supplies. Now doing fingerstick checks 3 times daily.    Regarding Diabetes Mellitus:    - Initially diagnosed with Type 2 diabetes mellitus:   - Current diabetic medications include: Tresiba 45 daily, Humalog 11 with moderate correction, Trulicity 3 mg weekly, fingersticks 3 times daily since running out of Dexcom supplies  - Other medications tried: Metformin (significant CKD), Actos (weight gain), Januvia (cost), U 500  - Weight based dosin kg x 0.5 = 67 TDD x 0.5 = 33 basal / 33 prandial  - 1700/TDD = 25 (estimated insulin sensitivity factor)  - 450/TDD = 6.7 (estimated starting carb ratio for prandial dosing)  - Family History: Mother with T2DM    Lab Results   Component Value Date    HGBA1C 7.7 (H) 2023    HGBA1C 7.4 (H) 2022    HGBA1C 8.1 (H) 2022     Lab Results   Component Value Date    EGFRNORACEVR 31.5 (A) 2023    EGFRNORACEVR 33.8 (A) 2022    EGFRNORACEVR 34.1 (A) 2022     - Current symptoms: Polydipsia (purposefully hydrates) and polyuria, numbness/tingling  - Denies: nausea/vomiting, abdominal discomfort, visual changes, or weight changes    - Known diabetic complications: Peripheral neuropathy, nephropathy, toe ulcer with osteomyelitis,  "hypoglycemia    - Last visit with diabetes education: : 12/01/2022  - Current diet: in general, a "healthy" diet    - Current exercise: Light lifting, walking  - Current glucose monitoring regimen: Fingersticks 3x daily  - Hypoglycemia? no    BP Readings from Last 3 Encounters:   02/06/23 (!) 130/50   01/12/23 (!) 141/65   01/05/23 133/70     Wt Readings from Last 3 Encounters:   02/06/23 130.7 kg (288 lb 4 oz)   01/12/23 134.3 kg (296 lb)   01/05/23 133.2 kg (293 lb 10.4 oz)     - Patient has diabetes mellitus and manages diabetes with intensive insulin regimen with three or more insulin injections daily  - Patient will benefit from a CGM to prevent hypoglycemia due to level 2 hypoglycemia or hypoglycemic event <54  - Patient requires a therapeutic CGM and is willing to use therapeutic CGM for the necessary frequent adjustments of insulin therapy  - Patient has been using fingerstick checks for frequent glucose monitoring. I have completed an in-person visit during the previous 6 months and will continue to have in-person visits every 6 months to assess adherence to their CGM regimen and diabetes treatment plan  - Due to COVID pandemic and need for remote monitoring this device is medically necessary    Diabetes Management Status    - Statin: Taking atorvastatin 20 mg daily  - ACE/ARB: Taking irbesartan 300 mg daily    Screening or Prevention Patient's value Goal Complete/Controlled?   HgA1C Testing and Control   Lab Results   Component Value Date    HGBA1C 7.7 (H) 01/05/2023      Annually/Less than 8% Yes     Lipid profile : 01/05/2023 Annually Yes     LDL control Lab Results   Component Value Date    LDLCALC 78.2 01/05/2023    Annually/Less than 100 mg/dl  Yes     Nephropathy screening Lab Results   Component Value Date    LABMICR 67.0 01/05/2023     Lab Results   Component Value Date    PROTEINUA Negative 11/13/2019    Annually or every 6 months if abnormal Yes     Blood pressure BP Readings from Last 1 " "Encounters:   02/06/23 (!) 130/50    Less than 140/90 No     Dilated retinal exam : 06/15/2022 Annually Yes     Foot exam   : 02/06/2023 Annually Yes        - I independently reviewed all pertinent outside records and imaging    Objective:   BP (!) 130/50   Pulse 70   Ht 5' 9" (1.753 m)   Wt 130.7 kg (288 lb 4 oz)   SpO2 99%   BMI 42.57 kg/m²   Physical Exam  Constitutional:       Appearance: She is obese.   Neurological:      General: No focal deficit present.      Mental Status: She is alert and oriented to person, place, and time.   Psychiatric:         Mood and Affect: Mood normal.         Behavior: Behavior normal.     BP Readings from Last 1 Encounters:   02/06/23 (!) 130/50      Wt Readings from Last 1 Encounters:   02/06/23 1040 130.7 kg (288 lb 4 oz)     Body mass index is 42.57 kg/m².    Lab Review:   Lab Results   Component Value Date    HGBA1C 7.7 (H) 01/05/2023     Lab Results   Component Value Date    CHOL 145 01/05/2023    HDL 36 (L) 01/05/2023    LDLCALC 78.2 01/05/2023    TRIG 154 (H) 01/05/2023    CHOLHDL 24.8 01/05/2023     Lab Results   Component Value Date     01/05/2023    K 4.5 01/05/2023     01/05/2023    CO2 25 01/05/2023     (H) 01/05/2023    BUN 28 (H) 01/05/2023    CREATININE 1.7 (H) 01/05/2023    CALCIUM 10.0 01/05/2023    PROT 6.4 01/05/2023    ALBUMIN 3.6 01/05/2023    BILITOT 0.4 01/05/2023    ALKPHOS 124 01/05/2023    AST 18 01/05/2023    ALT 17 01/05/2023    ANIONGAP 10 01/05/2023    ESTGFRAFRICA 29.9 (A) 06/13/2022    EGFRNONAA 26.0 (A) 06/13/2022    TSH 1.365 09/08/2020       All pertinent labs reviewed    Assessment and Plan     Type 2 diabetes mellitus with stage 3b chronic kidney disease and hypertension  - Continue Tresiba 45 units daily   - Continue Humalog 11 units with meals along with moderate correction  - Increase Trulicity from 3 mg up to 4.5 weekly  - Start Jardiance 10 mg daily primarily for renal protection  - Restart Dexcom, follow up with " diabetes education, requested 2/22 if possible    - Return to clinic in 6 months with complete metabolic panel, lipid panel, and repeat A1c prior    Stage 3b chronic kidney disease  - Starting Jardiance as above    - Further management per Nephrology    Morbid obesity  - Increase Trulicity as above and advise dietary, exercise, and other lifestyle modifications      Sudeep Disla DO  [facility], [address]  [address] [place], [address]    Office: [phone]  Fax: [phone]    Disclaimer: This note has been generated using voice-recognition software. There may be typographical errors that have been missed during proof-reading.    The above history labs imaging impression and plan were discussed with attending physician who is in agreement and also took part in this patient's care.  I personally reviewed all of the patients available medications, labs, imaging, vitals, allergies, medical history

## 2023-02-05 NOTE — ASSESSMENT & PLAN NOTE
- Continue Tresiba 45 units daily   - Continue Humalog 11 units with meals along with moderate correction  - Increase Trulicity from 3 mg up to 4.5 weekly  - Start Jardiance 10 mg daily primarily for renal protection  - Restart Dexcom, follow up with diabetes education, requested 2/22 if possible    - Return to clinic in 6 months with complete metabolic panel, lipid panel, and repeat A1c prior

## 2023-02-06 ENCOUNTER — OFFICE VISIT (OUTPATIENT)
Dept: ENDOCRINOLOGY | Facility: CLINIC | Age: 73
End: 2023-02-06
Payer: MEDICARE

## 2023-02-06 VITALS
DIASTOLIC BLOOD PRESSURE: 50 MMHG | SYSTOLIC BLOOD PRESSURE: 130 MMHG | OXYGEN SATURATION: 99 % | BODY MASS INDEX: 42.69 KG/M2 | HEIGHT: 69 IN | WEIGHT: 288.25 LBS | HEART RATE: 70 BPM

## 2023-02-06 DIAGNOSIS — E11.22 TYPE 2 DIABETES MELLITUS WITH STAGE 3B CHRONIC KIDNEY DISEASE AND HYPERTENSION: ICD-10-CM

## 2023-02-06 DIAGNOSIS — I12.9 TYPE 2 DIABETES MELLITUS WITH STAGE 3B CHRONIC KIDNEY DISEASE AND HYPERTENSION: Primary | ICD-10-CM

## 2023-02-06 DIAGNOSIS — E66.01 MORBID OBESITY: ICD-10-CM

## 2023-02-06 DIAGNOSIS — E11.22 TYPE 2 DIABETES MELLITUS WITH STAGE 3B CHRONIC KIDNEY DISEASE AND HYPERTENSION: Primary | ICD-10-CM

## 2023-02-06 DIAGNOSIS — I12.9 TYPE 2 DIABETES MELLITUS WITH STAGE 3B CHRONIC KIDNEY DISEASE AND HYPERTENSION: ICD-10-CM

## 2023-02-06 DIAGNOSIS — N18.32 STAGE 3B CHRONIC KIDNEY DISEASE: ICD-10-CM

## 2023-02-06 DIAGNOSIS — N18.32 TYPE 2 DIABETES MELLITUS WITH STAGE 3B CHRONIC KIDNEY DISEASE AND HYPERTENSION: ICD-10-CM

## 2023-02-06 DIAGNOSIS — N25.81 SECONDARY HYPERPARATHYROIDISM: ICD-10-CM

## 2023-02-06 DIAGNOSIS — N18.32 TYPE 2 DIABETES MELLITUS WITH STAGE 3B CHRONIC KIDNEY DISEASE AND HYPERTENSION: Primary | ICD-10-CM

## 2023-02-06 PROCEDURE — 99214 PR OFFICE/OUTPT VISIT, EST, LEVL IV, 30-39 MIN: ICD-10-PCS | Mod: GC,S$GLB,, | Performed by: STUDENT IN AN ORGANIZED HEALTH CARE EDUCATION/TRAINING PROGRAM

## 2023-02-06 PROCEDURE — 1159F PR MEDICATION LIST DOCUMENTED IN MEDICAL RECORD: ICD-10-PCS | Mod: CPTII,GC,S$GLB, | Performed by: STUDENT IN AN ORGANIZED HEALTH CARE EDUCATION/TRAINING PROGRAM

## 2023-02-06 PROCEDURE — 99999 PR PBB SHADOW E&M-EST. PATIENT-LVL V: ICD-10-PCS | Mod: PBBFAC,GC,, | Performed by: STUDENT IN AN ORGANIZED HEALTH CARE EDUCATION/TRAINING PROGRAM

## 2023-02-06 PROCEDURE — 3075F PR MOST RECENT SYSTOLIC BLOOD PRESS GE 130-139MM HG: ICD-10-PCS | Mod: CPTII,GC,S$GLB, | Performed by: STUDENT IN AN ORGANIZED HEALTH CARE EDUCATION/TRAINING PROGRAM

## 2023-02-06 PROCEDURE — 1101F PR PT FALLS ASSESS DOC 0-1 FALLS W/OUT INJ PAST YR: ICD-10-PCS | Mod: CPTII,GC,S$GLB, | Performed by: STUDENT IN AN ORGANIZED HEALTH CARE EDUCATION/TRAINING PROGRAM

## 2023-02-06 PROCEDURE — 3288F FALL RISK ASSESSMENT DOCD: CPT | Mod: CPTII,GC,S$GLB, | Performed by: STUDENT IN AN ORGANIZED HEALTH CARE EDUCATION/TRAINING PROGRAM

## 2023-02-06 PROCEDURE — 1101F PT FALLS ASSESS-DOCD LE1/YR: CPT | Mod: CPTII,GC,S$GLB, | Performed by: STUDENT IN AN ORGANIZED HEALTH CARE EDUCATION/TRAINING PROGRAM

## 2023-02-06 PROCEDURE — 3078F PR MOST RECENT DIASTOLIC BLOOD PRESSURE < 80 MM HG: ICD-10-PCS | Mod: CPTII,GC,S$GLB, | Performed by: STUDENT IN AN ORGANIZED HEALTH CARE EDUCATION/TRAINING PROGRAM

## 2023-02-06 PROCEDURE — 4010F PR ACE/ARB THEARPY RXD/TAKEN: ICD-10-PCS | Mod: CPTII,GC,S$GLB, | Performed by: STUDENT IN AN ORGANIZED HEALTH CARE EDUCATION/TRAINING PROGRAM

## 2023-02-06 PROCEDURE — 3066F PR DOCUMENTATION OF TREATMENT FOR NEPHROPATHY: ICD-10-PCS | Mod: CPTII,GC,S$GLB, | Performed by: STUDENT IN AN ORGANIZED HEALTH CARE EDUCATION/TRAINING PROGRAM

## 2023-02-06 PROCEDURE — 3075F SYST BP GE 130 - 139MM HG: CPT | Mod: CPTII,GC,S$GLB, | Performed by: STUDENT IN AN ORGANIZED HEALTH CARE EDUCATION/TRAINING PROGRAM

## 2023-02-06 PROCEDURE — 3066F NEPHROPATHY DOC TX: CPT | Mod: CPTII,GC,S$GLB, | Performed by: STUDENT IN AN ORGANIZED HEALTH CARE EDUCATION/TRAINING PROGRAM

## 2023-02-06 PROCEDURE — 4010F ACE/ARB THERAPY RXD/TAKEN: CPT | Mod: CPTII,GC,S$GLB, | Performed by: STUDENT IN AN ORGANIZED HEALTH CARE EDUCATION/TRAINING PROGRAM

## 2023-02-06 PROCEDURE — 1160F RVW MEDS BY RX/DR IN RCRD: CPT | Mod: CPTII,GC,S$GLB, | Performed by: STUDENT IN AN ORGANIZED HEALTH CARE EDUCATION/TRAINING PROGRAM

## 2023-02-06 PROCEDURE — 3051F PR MOST RECENT HEMOGLOBIN A1C LEVEL 7.0 - < 8.0%: ICD-10-PCS | Mod: CPTII,GC,S$GLB, | Performed by: STUDENT IN AN ORGANIZED HEALTH CARE EDUCATION/TRAINING PROGRAM

## 2023-02-06 PROCEDURE — 3008F BODY MASS INDEX DOCD: CPT | Mod: CPTII,GC,S$GLB, | Performed by: STUDENT IN AN ORGANIZED HEALTH CARE EDUCATION/TRAINING PROGRAM

## 2023-02-06 PROCEDURE — 1126F PR PAIN SEVERITY QUANTIFIED, NO PAIN PRESENT: ICD-10-PCS | Mod: CPTII,GC,S$GLB, | Performed by: STUDENT IN AN ORGANIZED HEALTH CARE EDUCATION/TRAINING PROGRAM

## 2023-02-06 PROCEDURE — 3051F HG A1C>EQUAL 7.0%<8.0%: CPT | Mod: CPTII,GC,S$GLB, | Performed by: STUDENT IN AN ORGANIZED HEALTH CARE EDUCATION/TRAINING PROGRAM

## 2023-02-06 PROCEDURE — 1126F AMNT PAIN NOTED NONE PRSNT: CPT | Mod: CPTII,GC,S$GLB, | Performed by: STUDENT IN AN ORGANIZED HEALTH CARE EDUCATION/TRAINING PROGRAM

## 2023-02-06 PROCEDURE — 3078F DIAST BP <80 MM HG: CPT | Mod: CPTII,GC,S$GLB, | Performed by: STUDENT IN AN ORGANIZED HEALTH CARE EDUCATION/TRAINING PROGRAM

## 2023-02-06 PROCEDURE — 1159F MED LIST DOCD IN RCRD: CPT | Mod: CPTII,GC,S$GLB, | Performed by: STUDENT IN AN ORGANIZED HEALTH CARE EDUCATION/TRAINING PROGRAM

## 2023-02-06 PROCEDURE — 3060F POS MICROALBUMINURIA REV: CPT | Mod: CPTII,GC,S$GLB, | Performed by: STUDENT IN AN ORGANIZED HEALTH CARE EDUCATION/TRAINING PROGRAM

## 2023-02-06 PROCEDURE — 3008F PR BODY MASS INDEX (BMI) DOCUMENTED: ICD-10-PCS | Mod: CPTII,GC,S$GLB, | Performed by: STUDENT IN AN ORGANIZED HEALTH CARE EDUCATION/TRAINING PROGRAM

## 2023-02-06 PROCEDURE — 99214 OFFICE O/P EST MOD 30 MIN: CPT | Mod: GC,S$GLB,, | Performed by: STUDENT IN AN ORGANIZED HEALTH CARE EDUCATION/TRAINING PROGRAM

## 2023-02-06 PROCEDURE — 99999 PR PBB SHADOW E&M-EST. PATIENT-LVL V: CPT | Mod: PBBFAC,GC,, | Performed by: STUDENT IN AN ORGANIZED HEALTH CARE EDUCATION/TRAINING PROGRAM

## 2023-02-06 PROCEDURE — 3060F PR POS MICROALBUMINURIA RESULT DOCUMENTED/REVIEW: ICD-10-PCS | Mod: CPTII,GC,S$GLB, | Performed by: STUDENT IN AN ORGANIZED HEALTH CARE EDUCATION/TRAINING PROGRAM

## 2023-02-06 PROCEDURE — 3288F PR FALLS RISK ASSESSMENT DOCUMENTED: ICD-10-PCS | Mod: CPTII,GC,S$GLB, | Performed by: STUDENT IN AN ORGANIZED HEALTH CARE EDUCATION/TRAINING PROGRAM

## 2023-02-06 PROCEDURE — 1160F PR REVIEW ALL MEDS BY PRESCRIBER/CLIN PHARMACIST DOCUMENTED: ICD-10-PCS | Mod: CPTII,GC,S$GLB, | Performed by: STUDENT IN AN ORGANIZED HEALTH CARE EDUCATION/TRAINING PROGRAM

## 2023-02-06 RX ORDER — BLOOD-GLUCOSE TRANSMITTER
EACH MISCELLANEOUS
Qty: 1 EACH | Refills: 3 | Status: SHIPPED | OUTPATIENT
Start: 2023-02-06 | End: 2023-02-06 | Stop reason: SDUPTHER

## 2023-02-06 RX ORDER — BLOOD-GLUCOSE SENSOR
EACH MISCELLANEOUS
Qty: 3 EACH | Refills: 11 | Status: SHIPPED | OUTPATIENT
Start: 2023-02-06 | End: 2023-02-07 | Stop reason: SDUPTHER

## 2023-02-06 RX ORDER — BLOOD-GLUCOSE TRANSMITTER
EACH MISCELLANEOUS
Qty: 1 EACH | Refills: 3 | Status: SHIPPED | OUTPATIENT
Start: 2023-02-06 | End: 2023-02-07 | Stop reason: SDUPTHER

## 2023-02-06 RX ORDER — BLOOD-GLUCOSE SENSOR
EACH MISCELLANEOUS
Qty: 3 EACH | Refills: 11 | Status: SHIPPED | OUTPATIENT
Start: 2023-02-06 | End: 2023-02-06 | Stop reason: SDUPTHER

## 2023-02-06 RX ORDER — DULAGLUTIDE 4.5 MG/.5ML
4.5 INJECTION, SOLUTION SUBCUTANEOUS
Qty: 12 PEN | Refills: 3 | Status: SHIPPED | OUTPATIENT
Start: 2023-02-06 | End: 2024-01-02

## 2023-02-07 DIAGNOSIS — N18.32 TYPE 2 DIABETES MELLITUS WITH STAGE 3B CHRONIC KIDNEY DISEASE AND HYPERTENSION: ICD-10-CM

## 2023-02-07 DIAGNOSIS — E11.22 TYPE 2 DIABETES MELLITUS WITH STAGE 3B CHRONIC KIDNEY DISEASE AND HYPERTENSION: ICD-10-CM

## 2023-02-07 DIAGNOSIS — I12.9 TYPE 2 DIABETES MELLITUS WITH STAGE 3B CHRONIC KIDNEY DISEASE AND HYPERTENSION: ICD-10-CM

## 2023-02-07 RX ORDER — BLOOD-GLUCOSE TRANSMITTER
EACH MISCELLANEOUS
Qty: 1 EACH | Refills: 3 | Status: SHIPPED | OUTPATIENT
Start: 2023-02-07 | End: 2023-02-15 | Stop reason: SDUPTHER

## 2023-02-07 RX ORDER — BLOOD-GLUCOSE SENSOR
EACH MISCELLANEOUS
Qty: 3 EACH | Refills: 11 | Status: SHIPPED | OUTPATIENT
Start: 2023-02-07 | End: 2023-02-15 | Stop reason: SDUPTHER

## 2023-02-09 ENCOUNTER — PATIENT MESSAGE (OUTPATIENT)
Dept: DIABETES | Facility: CLINIC | Age: 73
End: 2023-02-09
Payer: MEDICARE

## 2023-02-14 ENCOUNTER — TELEPHONE (OUTPATIENT)
Dept: PODIATRY | Facility: CLINIC | Age: 73
End: 2023-02-14
Payer: MEDICARE

## 2023-02-14 NOTE — TELEPHONE ENCOUNTER
Spoke with patient to reschedule patient due to provider cancellation. New appointment date and time 03/14/23 at 12:15p. Patient voice understanding to new appointment time and date.

## 2023-02-15 ENCOUNTER — TELEPHONE (OUTPATIENT)
Dept: ENDOCRINOLOGY | Facility: CLINIC | Age: 73
End: 2023-02-15
Payer: MEDICARE

## 2023-02-15 DIAGNOSIS — N18.32 TYPE 2 DIABETES MELLITUS WITH STAGE 3B CHRONIC KIDNEY DISEASE AND HYPERTENSION: ICD-10-CM

## 2023-02-15 DIAGNOSIS — I12.9 TYPE 2 DIABETES MELLITUS WITH STAGE 3B CHRONIC KIDNEY DISEASE AND HYPERTENSION: ICD-10-CM

## 2023-02-15 DIAGNOSIS — E11.22 TYPE 2 DIABETES MELLITUS WITH STAGE 3B CHRONIC KIDNEY DISEASE AND HYPERTENSION: ICD-10-CM

## 2023-02-15 RX ORDER — BLOOD-GLUCOSE TRANSMITTER
EACH MISCELLANEOUS
Qty: 1 EACH | Refills: 3 | Status: SHIPPED | OUTPATIENT
Start: 2023-02-15 | End: 2023-04-18

## 2023-02-15 RX ORDER — BLOOD-GLUCOSE SENSOR
EACH MISCELLANEOUS
Qty: 3 EACH | Refills: 11 | Status: SHIPPED | OUTPATIENT
Start: 2023-02-15 | End: 2023-04-18

## 2023-02-20 NOTE — PROGRESS NOTES
Subjective:      Patient ID: Annika Mac is a 72 y.o. female.    Chief Complaint: Diabetes Mellitus (pcp - Mamie Cotton MD - 9/14/22) and Nail Care      Annika is a 72 y.o. female who presents to the clinic for evaluation and treatment of high risk feet. Annika has a past medical history of Asthma, Chronic obstructive pulmonary disease, unspecified COPD type (1/14/2022), Constipation (10/3/2019), Deep vein thrombophlebitis of left leg (7/27/2012), Deep vein thrombosis, Diabetic foot ulcer with osteomyelitis, Encounter for blood transfusion, GERD (gastroesophageal reflux disease), Obesity, diabetes, and hypertension syndrome (2/13/2019), Obstructive sleep apnea (7/27/2012), PAD (peripheral artery disease) (11/21/2013), Pressure ulcer of toe of left foot, Type 2 diabetes mellitus with obesity (8/19/2020), and Type 2 diabetes mellitus with peripheral artery disease (8/19/2020). The patient's chief complaint is long, thick toenails. This patient has documented high risk feet requiring routine maintenance secondary to peripheral neuropathy.    PCP: Mamie Cotton MD    Date Last Seen by PCP:   Chief Complaint   Patient presents with    Diabetes Mellitus     pcp - Mamie Cotton MD - 9/14/22    Nail Care         Current shoe gear:  Affected Foot: Slip-on shoes     Unaffected Foot: Slip-on shoes    Hemoglobin A1C   Date Value Ref Range Status   01/05/2023 7.7 (H) 4.0 - 5.6 % Final     Comment:     ADA Screening Guidelines:  5.7-6.4%  Consistent with prediabetes  >or=6.5%  Consistent with diabetes    High levels of fetal hemoglobin interfere with the HbA1C  assay. Heterozygous hemoglobin variants (HbS, HgC, etc)do  not significantly interfere with this assay.   However, presence of multiple variants may affect accuracy.     08/24/2022 7.4 (H) 4.0 - 5.6 % Final     Comment:     ADA Screening Guidelines:  5.7-6.4%  Consistent with prediabetes  >or=6.5%  Consistent with diabetes    High levels of  fetal hemoglobin interfere with the HbA1C  assay. Heterozygous hemoglobin variants (HbS, HgC, etc)do  not significantly interfere with this assay.   However, presence of multiple variants may affect accuracy.     04/11/2022 8.1 (H) 4.0 - 5.6 % Final     Comment:     ADA Screening Guidelines:  5.7-6.4%  Consistent with prediabetes  >or=6.5%  Consistent with diabetes    High levels of fetal hemoglobin interfere with the HbA1C  assay. Heterozygous hemoglobin variants (HbS, HgC, etc)do  not significantly interfere with this assay.   However, presence of multiple variants may affect accuracy.         Review of Systems   Constitutional: Negative for chills, fever and malaise/fatigue.   HENT:  Negative for hearing loss.    Cardiovascular:  Negative for claudication.   Respiratory:  Negative for shortness of breath.    Skin:  Positive for color change, dry skin, nail changes and unusual hair distribution. Negative for flushing and rash.   Musculoskeletal:  Negative for joint pain and myalgias.   Neurological:  Positive for numbness, paresthesias and sensory change. Negative for loss of balance.   Psychiatric/Behavioral:  Negative for altered mental status.          Objective:      Physical Exam  Vitals reviewed.   Constitutional:       Appearance: She is well-developed.   Cardiovascular:      Pulses:           Dorsalis pedis pulses are 0 on the right side and 0 on the left side.        Posterior tibial pulses are 1+ on the right side and 1+ on the left side.   Musculoskeletal:      Right ankle: Decreased range of motion. Abnormal pulse.      Right Achilles Tendon: Patel's test negative.      Left ankle: Decreased range of motion. Abnormal pulse.      Left Achilles Tendon: Patel's test negative.      Right foot: Decreased range of motion.      Left foot: Decreased range of motion.   Feet:      Right foot:      Protective Sensation: 5 sites tested.  2 sites sensed.      Left foot:      Protective Sensation: 5 sites  tested.  2 sites sensed.   Skin:     General: Skin is dry.      Capillary Refill: Capillary refill takes more than 3 seconds.      Comments: Nails x10 are elongated by  4-6mm's, thickened by 2-3 mm's, dystrophic, and are yellowish in  coloration . Xerosis Bilaterally. No open lesions noted.    Neurological:      Mental Status: She is alert.      Comments: diminished sensation noted to b/L lower extremities   Psychiatric:         Behavior: Behavior normal. Behavior is cooperative.             Assessment:       Encounter Diagnoses   Name Primary?    Diabetic polyneuropathy associated with type 2 diabetes mellitus Yes    Onychomycosis due to dermatophyte          Plan:       Annika was seen today for diabetes mellitus and nail care.    Diagnoses and all orders for this visit:    Diabetic polyneuropathy associated with type 2 diabetes mellitus    Onychomycosis due to dermatophyte    I counseled the patient on her conditions, their implications and medical management.        - Shoe inspection. Diabetic Foot Education. Patient reminded of the importance of good nutrition and blood sugar control to help prevent podiatric complications of diabetes. Patient instructed on proper foot hygeine. We discussed wearing proper shoe gear, daily foot inspections, never walking without protective shoe gear, never putting sharp instruments to feet, routine podiatric nail visits every 2-3 months.      - With patient's permission, nails were aggressively reduced and debrided x 10 to their soft tissue attachment mechanically and with electric , removing all offending nail and debris. Patient relates relief following the procedure. She will continue to monitor the areas daily, inspect her feet, wear protective shoe gear when ambulatory, moisturizer to maintain skin integrity and follow in this office in approximately 2-3 months, sooner p.r.n.

## 2023-02-22 ENCOUNTER — PATIENT MESSAGE (OUTPATIENT)
Dept: BARIATRICS | Facility: CLINIC | Age: 73
End: 2023-02-22
Payer: MEDICARE

## 2023-02-22 ENCOUNTER — CLINICAL SUPPORT (OUTPATIENT)
Dept: DIABETES | Facility: CLINIC | Age: 73
End: 2023-02-22
Payer: MEDICARE

## 2023-02-22 DIAGNOSIS — I12.9 TYPE 2 DIABETES MELLITUS WITH STAGE 3B CHRONIC KIDNEY DISEASE AND HYPERTENSION: ICD-10-CM

## 2023-02-22 DIAGNOSIS — E11.22 TYPE 2 DIABETES MELLITUS WITH STAGE 3B CHRONIC KIDNEY DISEASE AND HYPERTENSION: ICD-10-CM

## 2023-02-22 DIAGNOSIS — N18.32 TYPE 2 DIABETES MELLITUS WITH STAGE 3B CHRONIC KIDNEY DISEASE AND HYPERTENSION: ICD-10-CM

## 2023-02-22 PROCEDURE — G0108 PR DIAB MANAGE TRN  PER INDIV: ICD-10-PCS | Mod: S$GLB,,, | Performed by: INTERNAL MEDICINE

## 2023-02-22 PROCEDURE — 99999 PR PBB SHADOW E&M-EST. PATIENT-LVL I: ICD-10-PCS | Mod: PBBFAC,,,

## 2023-02-22 PROCEDURE — 99999 PR PBB SHADOW E&M-EST. PATIENT-LVL I: CPT | Mod: PBBFAC,,,

## 2023-02-22 PROCEDURE — G0108 DIAB MANAGE TRN  PER INDIV: HCPCS | Mod: S$GLB,,, | Performed by: INTERNAL MEDICINE

## 2023-02-23 VITALS — BODY MASS INDEX: 43.3 KG/M2 | WEIGHT: 293 LBS

## 2023-02-23 NOTE — PROGRESS NOTES
Diabetes Care Specialist Progress Note  Author: Maria G Lopez RN, CDE  Date: 2/22/2023    Program Intake  Reason for Diabetes Program Visit:: Intervention  Type of Intervention:: Individual  Individual: Education  Education: Self-Management Skill Review, Nutrition and Meal Planning  In the last 12 months, have you:: none  Permission to speak with others about care:: no    Lab Results   Component Value Date    HGBA1C 7.7 (H) 01/05/2023     Current Diabetic Medications: Tresiba 45 units in am; Humalog 14 units ac meals plus s/s  Trulicity 4.5 mg weekly    Clinical    Problem Review  Reviewed Problem List with Patient: yes  Active comorbidities affecting diabetes self-care.: yes  Comorbidities: Hypertension, Chronic Kidney Disease, Other (comment) (Difficulty with ambulation)  Reviewed health maintenance: yes    Clinical Assessment  Have you ever experienced hypoglycemia (low blood sugar)?: yes  In the last month, how often have you experienced low blood sugar?: once a day  What symptoms do you experience?: Dizzy/Light-headed, Shaky  Have you ever been hospitalized because your blood sugar was too low?: yes (see comments) (Was treated at hospital in April for low bg)  Have you ever experienced hyperglycemia (high blood sugar)?: yes  In the last month, how often have you experienced high blood sugar?: more than once a day    Nutritional Status  Meal Plan 24 Hour Recall: Breakfast, Lunch, Snack  Meal Plan 24 Hour Recall - Breakfast: yesterday; waffles, eggs, turkey phan  Meal Plan 24 Hour Recall - Lunch: cabbage and corn bread  Meal Plan 24 Hour Recall - Snack: juice, peanut butter crackers    Additional Social History    Support  Does anyone support you with your diabetes care?: yes    Access to [employer]  Does the patient have access to any of the following devices or technologies?: Smart phone (Not compatible with Dexcom nicholas)      Health Literacy  Preferred Learning Method: Face to Face      Diabetes  Self-Management Skills Assessment    Diabetes Disease Process/Treatment Options  Patient/caregiver able to state what happens when someone has diabetes.: yes  Patient/caregiver knows what type of diabetes they have.: yes  Diabetes Type : Type II  Assessment indicates:: Adequate understanding  Area of need?: No    Nutrition/Healthy Eating  Challenges to healthy eating:: portion control  Method of carbohydrate measurement:: no method  Patient can identify foods that impact blood sugar.: yes  Patient-identified foods:: sweets  Assessment indicates:: Knowledge deficit, Instruction Needed  Area of need?: Yes    Physical Activity/Exercise  Patient's daily activity level:: sedentary  Patient formally exercises outside of work.: no  Reasons for not exercising:: physically unable to exercise currently  Patient can identify forms of physical activity.: no  Assessment indicates:: Knowledge deficit  Area of need?: Yes    Medications  Patient is able to describe current diabetes management routine.: yes  Diabetes management routine:: insulin  Patient is able to identify current diabetes medications, dosages, and appropriate timing of medications.: yes  Patient understands the purpose of the medications taken for diabetes.: yes  Patient reports problems or concerns with current medication regimen.: yes  Medication regimen problems/concerns:: concerned about side effects  Assessment indicates:: Adequate understanding  Area of need?: No    Home Blood Glucose Monitoring  Patient states that blood sugar is checked at home daily.: yes  Monitoring Method:: home glucometer  Home glucometer meter type:: Insurance preferred meter  Blood glucose logs:: yes, encouraged to keep logs, encouraged to bring logs to provider visits  Blood glucose logs reviewed today?: yes  Assessment indicates:: Adequate understanding  Area of need?: No    Acute Complications  Able to state the blood sugar range for hypoglycemia?: yes  Patient can identify  general symptoms of hypoglycemia: yes  Patient identified:: shakiness  Able to state proper treatment of hypoglycemia?: yes  Patient identified:: 5-6 pieces of hard candy, 1/2 can soda/fruit juice  Assessment indicates:: Adequate understanding  Area of need?: No    Chronic Complications  Patient can identify major chronic complications of diabetes.: yes  Stated chronic complications:: kidney disease  Patient can identify ways to prevent or delay diabetes complications.: yes  Stated ways to prevent complications:: controlling blood sugar  Assessment indicates:: Knowledge deficit  Area of need?: Yes    Psychosocial/Coping  Patient can identify ways of coping with chronic disease.: yes  Patient-stated ways of coping with chronic disease:: support from loved ones  Assessment indicates:: Adequate understanding  Area of need?: No       Assessment Summary and Plan    Based on today's diabetes care assessment, the following areas of need were identified:      Social 12/1/2022   Cognitive/Behavioral Health No   Communication No        Clinical 8/31/2022   Nutritional Status Yes        Diabetes Self-Management Skills 2/22/2023   Diabetes Disease Process/Treatment Options No   Nutrition/Healthy Eating Yes, see care planning   Physical Activity/Exercise Yes, she was encouraged to continue with chair exercises   Medication No, see care planning   Home Blood Glucose Monitoring No, Plans to restart Dexcom, will call when she receives her system.    Acute Complications No   Chronic Complications No   Psychosocial/Coping No            Today's interventions were provided through individual discussion, instruction, and written materials were provided.      Patient verbalized understanding of instruction and written materials.  Pt was able to return back demonstration of instructions today. Patient understood key points, needs reinforcement and further instruction.     Diabetes Self-Management Care Plan:    Today's Diabetes  Self-Management Care Plan was developed with Annika's input. Annika has agreed to work toward the following goal(s) to improve his/her overall diabetes control.      Care Plan: Diabetes Management   Updates made since 1/24/2023 12:00 AM        Problem: Medications         Goal: Patient Agrees to take Diabetes Medication(s) as prescribed.    Start Date: 5/4/2021   Expected End Date: 2/13/2023   This Visit's Progress: On track   Recent Progress: On track   Priority: High   Barriers: No Barriers Identified   Note:    Dexcom download was evaluated.  She is having lows at various times of the days. Most lows are occurring overnight and in the afternoon which corresponds to her reported low she experinced a few weeks back where intervention was required. Denies any missed doses and is eating 3 meals per day.  Per HCP her insulin will be reduced to the following: Novolin 70/30 80 units in am and 40 units in pm.  She will stop giving the lunch dose.     She records her carb amounts in Dexcom, however she has been entering them incorrectly and reflects a larger amount of carbs than what she is consuming.   She was instructed on how to enter the correct amt into her Dexcom.     Update to care planning 2/17/2022:  Currently taking Tresiba 30 units in am, Humalog 10 units ac meals and Ozempic.  Blood glucose logs reviewed.  Patient doing well with her numbers see attached logs.  Not using the Dexcom.  Current A1c down to 6.6%    Update to care planning 4/18/2022:  current A1c is up at 8.1%.  this is an increase from January A1c.  Dr. Love has increased her Tresiba to 35 and Humalog 10/11/11 plus s/s.  She also has been in pain with her back which is causing her stress and discomfort.  This may also be a contributing factor to her elevated numbers. Continues with ozempic.     Update to care planning 5/9/2022: Patient today for DM ed.  BG logs reviewed by MD. Dr. Love increased her Tresiba from 35 to 40 units daily for  elevations especially in am.  Ms. Mac was not using the sliding scale as ordered.  Reviewed how to use it and instructed to use 150 +1 s/s with Humalog only.  Currently takes 10/11/11 with meals.  Understanding verbalized.     Update to care planning 12/1/2022: Ms. Roblero was seen for review of her bg logs. Mostly elevated in 152 to 198 range in morning; lunch and dinner same.  Her A1c has improved since April.  Reviewed logs with Dr. Love and will make the following changes: Increase Tresiba to 45 units in am and Humalog 12 units ac meals plus s/s 150 +2.  Understanding verbalized.     Update to care planning 2/22/23:  Ms. Roblero was seen today in clinic for bg log review.  She is planning on restarting her Dexcom and is awaiting delivery from distributor. BG in  acceptable ranges with no low glucose noted.  Highest seen per logs 255. Taking insulin as instruction without issues. Denies missed doses.  Advised to contact me when she receives her Dexcom for training.        Problem: Healthy Eating         Goal: Eat 2-3 meals daily with 30-45g/2-3 servings of Carbohydrate per meal. Limit snacking in between meal to 1 serving (15 grams).    Start Date: 8/31/2022   Expected End Date: 4/10/2023   This Visit's Progress: On track   Recent Progress: On track   Priority: Medium   Barriers: Lack of Motivation to Change; No Barriers Identified   Note:    Reviewed meal planning and general nutritional counseling with regards to diabetes management. Meal habits reviewed. We concentrated on portion sizes and carb limits for each meal.  This has been a challenge for Annika in the past.  Stress the importance of making better meal choices and to measure all foods.   Encouraged increased consumption of non starchy veggies to diet.  Overall planning meals and making better choices.  Patient is motivated to make changes.     Update to care planning 4/18/2022: States eating schedule is off since she cares for grandchildren 3-5 x per  week and tends to eat what is available. Suggested bringing fruit and salad with her when she is out of the house and to try to avoid snack type foods. States wants to do better and is willing to try.     Update to care planning 5/9/2022:  Reviewed changes made since last visit.  States she feels she is more on schedule with her meals now.  Usually doing a shake for breakfast.  Encouraged to measure all foods especially carbs. States she is eating more salads.     Update to care planning 8/31/22: Pt stated she needed help with carb counting. We reviewed the carb list, carb servings and grams of carbs. I also showed her the plate method and she seemed interested in using that to monitor her carb intake.     Update to care planning 12/1/2022:  Reviewed meal planning today.  Doing better, less snacking. She needs to lose 18 lbs to qualify for knee replacement.  Advised to stay on low carb diet, avoid any sources of sugar in foods, perform some chair exercising like stretching. Increase consumption of non starchy veggies in diet.  No snacking.    Update to care planning 2/22/23:  Ms. Roblero has gained about 5-6 pounds recently. Her weight loss gain is 18 lbs to qualify for knee surgery.  Maybe be doing more snacking than usual.  Mostly on Peanut butter crackers. Drinking sweet tea again.  We reviewed the importance of portion control and she was encouraged to add more veggies to diet.  Explained in order to lose weight, she must consume less and increase activity. Her  is preparing her meals using portioning. The snacking is mostly at night.  Suggested some non carb sources to eat. Positive reinforcement provided.          Follow Up Plan     Ms. Mac will reach out to clinic for Dexcom training    Today's care plan and follow up schedule was discussed with patient.  Annika verbalized understanding of the care plan, goals, and agrees to follow up plan.        The patient was encouraged to communicate with his/her  health care provider/physician and care team regarding his/her condition(s) and treatment.  I provided the patient with my contact information today and encouraged to contact me via phone or [facility] Patient Portal as needed.     Length of Visit   Total Time: 60 Minutes

## 2023-02-28 ENCOUNTER — OFFICE VISIT (OUTPATIENT)
Dept: OPHTHALMOLOGY | Facility: CLINIC | Age: 73
End: 2023-02-28
Payer: MEDICARE

## 2023-02-28 ENCOUNTER — HOSPITAL ENCOUNTER (EMERGENCY)
Facility: HOSPITAL | Age: 73
Discharge: HOME | End: 2023-02-28
Attending: EMERGENCY MEDICINE
Payer: MEDICARE

## 2023-02-28 VITALS
SYSTOLIC BLOOD PRESSURE: 142 MMHG | WEIGHT: 293 LBS | DIASTOLIC BLOOD PRESSURE: 67 MMHG | HEIGHT: 65 IN | HEART RATE: 64 BPM | TEMPERATURE: 97 F | RESPIRATION RATE: 18 BRPM | OXYGEN SATURATION: 99 % | BODY MASS INDEX: 48.82 KG/M2

## 2023-02-28 DIAGNOSIS — E11.3513 TYPE 2 DIABETES MELLITUS WITH BOTH EYES AFFECTED BY PROLIFERATIVE RETINOPATHY AND MACULAR EDEMA, WITH LONG-TERM CURRENT USE OF INSULIN: Primary | ICD-10-CM

## 2023-02-28 DIAGNOSIS — H35.373 EPIRETINAL MEMBRANE (ERM) OF BOTH EYES: ICD-10-CM

## 2023-02-28 DIAGNOSIS — S86.911A KNEE STRAIN, RIGHT, INITIAL ENCOUNTER: ICD-10-CM

## 2023-02-28 DIAGNOSIS — S80.01XA CONTUSION OF RIGHT KNEE, INITIAL ENCOUNTER: Primary | ICD-10-CM

## 2023-02-28 DIAGNOSIS — Z79.4 TYPE 2 DIABETES MELLITUS WITH BOTH EYES AFFECTED BY PROLIFERATIVE RETINOPATHY AND MACULAR EDEMA, WITH LONG-TERM CURRENT USE OF INSULIN: Primary | ICD-10-CM

## 2023-02-28 DIAGNOSIS — M25.519 SHOULDER PAIN, ACUTE: ICD-10-CM

## 2023-02-28 PROCEDURE — 92014 PR EYE EXAM, EST PATIENT,COMPREHESV: ICD-10-PCS | Mod: S$GLB,,, | Performed by: OPHTHALMOLOGY

## 2023-02-28 PROCEDURE — 99999 PR PBB SHADOW E&M-EST. PATIENT-LVL IV: CPT | Mod: PBBFAC,,, | Performed by: OPHTHALMOLOGY

## 2023-02-28 PROCEDURE — 92201 OPSCPY EXTND RTA DRAW UNI/BI: CPT | Mod: S$GLB,,, | Performed by: OPHTHALMOLOGY

## 2023-02-28 PROCEDURE — 99284 EMERGENCY DEPT VISIT MOD MDM: CPT

## 2023-02-28 PROCEDURE — 1126F PR PAIN SEVERITY QUANTIFIED, NO PAIN PRESENT: ICD-10-PCS | Mod: CPTII,S$GLB,, | Performed by: OPHTHALMOLOGY

## 2023-02-28 PROCEDURE — 3288F FALL RISK ASSESSMENT DOCD: CPT | Mod: CPTII,S$GLB,, | Performed by: OPHTHALMOLOGY

## 2023-02-28 PROCEDURE — 1159F PR MEDICATION LIST DOCUMENTED IN MEDICAL RECORD: ICD-10-PCS | Mod: CPTII,S$GLB,, | Performed by: OPHTHALMOLOGY

## 2023-02-28 PROCEDURE — 1100F PR PT FALLS ASSESS DOC 2+ FALLS/FALL W/INJURY/YR: ICD-10-PCS | Mod: CPTII,S$GLB,, | Performed by: OPHTHALMOLOGY

## 2023-02-28 PROCEDURE — 1160F RVW MEDS BY RX/DR IN RCRD: CPT | Mod: CPTII,S$GLB,, | Performed by: OPHTHALMOLOGY

## 2023-02-28 PROCEDURE — 4010F ACE/ARB THERAPY RXD/TAKEN: CPT | Mod: CPTII,S$GLB,, | Performed by: OPHTHALMOLOGY

## 2023-02-28 PROCEDURE — 3060F PR POS MICROALBUMINURIA RESULT DOCUMENTED/REVIEW: ICD-10-PCS | Mod: CPTII,S$GLB,, | Performed by: OPHTHALMOLOGY

## 2023-02-28 PROCEDURE — 3066F NEPHROPATHY DOC TX: CPT | Mod: CPTII,S$GLB,, | Performed by: OPHTHALMOLOGY

## 2023-02-28 PROCEDURE — 92201 PR OPHTHALMOSCOPY, EXT, W/RET DRAW/SCLERAL DEPR, I&R, UNI/BI: ICD-10-PCS | Mod: S$GLB,,, | Performed by: OPHTHALMOLOGY

## 2023-02-28 PROCEDURE — 3066F PR DOCUMENTATION OF TREATMENT FOR NEPHROPATHY: ICD-10-PCS | Mod: CPTII,S$GLB,, | Performed by: OPHTHALMOLOGY

## 2023-02-28 PROCEDURE — 92134 CPTRZ OPH DX IMG PST SGM RTA: CPT | Mod: S$GLB,,, | Performed by: OPHTHALMOLOGY

## 2023-02-28 PROCEDURE — 99283 PR EMERGENCY DEPT VISIT,LEVEL III: ICD-10-PCS | Mod: ,,, | Performed by: EMERGENCY MEDICINE

## 2023-02-28 PROCEDURE — 4010F PR ACE/ARB THEARPY RXD/TAKEN: ICD-10-PCS | Mod: CPTII,S$GLB,, | Performed by: OPHTHALMOLOGY

## 2023-02-28 PROCEDURE — 99999 PR PBB SHADOW E&M-EST. PATIENT-LVL IV: ICD-10-PCS | Mod: PBBFAC,,, | Performed by: OPHTHALMOLOGY

## 2023-02-28 PROCEDURE — 99283 EMERGENCY DEPT VISIT LOW MDM: CPT | Mod: ,,, | Performed by: EMERGENCY MEDICINE

## 2023-02-28 PROCEDURE — 92134 POSTERIOR SEGMENT OCT RETINA (OCULAR COHERENCE TOMOGRAPHY)-BOTH EYES: ICD-10-PCS | Mod: S$GLB,,, | Performed by: OPHTHALMOLOGY

## 2023-02-28 PROCEDURE — 92014 COMPRE OPH EXAM EST PT 1/>: CPT | Mod: S$GLB,,, | Performed by: OPHTHALMOLOGY

## 2023-02-28 PROCEDURE — 1126F AMNT PAIN NOTED NONE PRSNT: CPT | Mod: CPTII,S$GLB,, | Performed by: OPHTHALMOLOGY

## 2023-02-28 PROCEDURE — 3060F POS MICROALBUMINURIA REV: CPT | Mod: CPTII,S$GLB,, | Performed by: OPHTHALMOLOGY

## 2023-02-28 PROCEDURE — 3288F PR FALLS RISK ASSESSMENT DOCUMENTED: ICD-10-PCS | Mod: CPTII,S$GLB,, | Performed by: OPHTHALMOLOGY

## 2023-02-28 PROCEDURE — 1100F PTFALLS ASSESS-DOCD GE2>/YR: CPT | Mod: CPTII,S$GLB,, | Performed by: OPHTHALMOLOGY

## 2023-02-28 PROCEDURE — 1160F PR REVIEW ALL MEDS BY PRESCRIBER/CLIN PHARMACIST DOCUMENTED: ICD-10-PCS | Mod: CPTII,S$GLB,, | Performed by: OPHTHALMOLOGY

## 2023-02-28 PROCEDURE — 3051F PR MOST RECENT HEMOGLOBIN A1C LEVEL 7.0 - < 8.0%: ICD-10-PCS | Mod: CPTII,S$GLB,, | Performed by: OPHTHALMOLOGY

## 2023-02-28 PROCEDURE — 3051F HG A1C>EQUAL 7.0%<8.0%: CPT | Mod: CPTII,S$GLB,, | Performed by: OPHTHALMOLOGY

## 2023-02-28 PROCEDURE — 1159F MED LIST DOCD IN RCRD: CPT | Mod: CPTII,S$GLB,, | Performed by: OPHTHALMOLOGY

## 2023-02-28 RX ORDER — LACTULOSE 10 G/15ML
SOLUTION ORAL
COMMUNITY
Start: 2023-01-06

## 2023-02-28 NOTE — ED TRIAGE NOTES
Pt present to ED with c/o R shoulder, wrist and leg pain after mechanical trip and fall Saturday. Denies hitting head/LOC. Pt AxOx4, ROM intact.

## 2023-02-28 NOTE — ED PROVIDER NOTES
Encounter Date: 2/28/2023    SCRIBE #1 NOTE: I, Kasey Villasenor, am scribing for, and in the presence of,  Kodi Jacob III, MD. I have scribed the following portions of the note - Other sections scribed: HPI, ROS, PE.     History     Chief Complaint   Patient presents with    Fall     Tripped over wire on Sunday, pain to r wrist and shoulder, no loc     Time patient was seen by the provider: 10:41 AM      The patient is a 72 y.o. female with past medical history of kidney disease, DM type 2, DVT, PAD, who presents to the ED with a complaint of right shoulder and wrist pain after a mechanical trip and fall 2 days ago. The patient tripped over a wire in her house and fell forward onto a marble floor. She braced the fall with her hands. Patient did not hit her head or LOC. She was able to stand on her own and ambulatory after the fall. Yesterday, she began to develop pain that has been persistent today. Therefore, she came to the ED. The patient has chronic right knee pain that requires a knee replacement. However, she does not have a date scheduled yet. She is not in physical therapy for her knee. The patient has mild right hip pain. She placed ice on her shoulder and wrist and took Tylenol with no relief. The patient currently has a brace on her right wrist due to pain following her fall. She has pain with bending the fingers on her right hand. Patient is right-hand dominant. She is retired. The patient denies neck pain, calf pain, and ankle pain.      The history is provided by the patient and medical records. No  was used.   Review of patient's allergies indicates:   Allergen Reactions    Clindamycin Hives and Swelling    Iodinated contrast media Rash     Past Medical History:   Diagnosis Date    Asthma     Chronic obstructive pulmonary disease, unspecified COPD type 1/14/2022    Constipation 10/3/2019    Deep vein thrombophlebitis of left leg 7/27/2012    Deep vein thrombosis     when she had  a knee replacement    Diabetic foot ulcer with osteomyelitis     Encounter for blood transfusion     anemic     GERD (gastroesophageal reflux disease)     Obesity, diabetes, and hypertension syndrome 2019    Obstructive sleep apnea 2012    PAD (peripheral artery disease) 2013    Pressure ulcer of toe of left foot     Type 2 diabetes mellitus with obesity 2020    Type 2 diabetes mellitus with peripheral artery disease 2020     Past Surgical History:   Procedure Laterality Date    CATARACT EXTRACTION W/  INTRAOCULAR LENS IMPLANT Left 3/2002    left     CATARACT EXTRACTION W/  INTRAOCULAR LENS IMPLANT Right     OD dr. olivares     SECTION      COLONOSCOPY N/A 2018    Procedure: COLONOSCOPY;  Surgeon: Faizan Mac MD;  Location: [facility] ENDO (2ND FLR);  Service: Endoscopy;  Laterality: N/A;    CYST REMOVAL  2011    left side of face    CYSTOSCOPY W/ RETROGRADES Left 2020    Procedure: CYSTOSCOPY, WITH RETROGRADE PYELOGRAM;  Surgeon: Noman Rivas MD;  Location: [facility] OR 1ST FLR;  Service: Urology;  Laterality: Left;  30min    DE QUERVAIN'S RELEASE  2021    Procedure: RELEASE, HAND, FOR DEQUERVAIN'S TENOSYNOVITIS;  Surgeon: Marky Hagen MD;  Location: [facility] OR;  Service: Orthopedics;;    EYE SURGERY Bilateral     eye implants    GANGLION CYST EXCISION  2007    Right wrist    INJECTION OF ANESTHETIC AGENT AROUND MEDIAL BRANCH NERVES INNERVATING LUMBAR FACET JOINT Bilateral 2022    Procedure: Block-nerve-medial branch-lumbar bilateral L4-5 and L5-S1;  Surgeon: Alireza Meraz Jr., MD;  Location: [facility] PAIN MGT;  Service: Pain Management;  Laterality: Bilateral;  pt is diabetic   asa    INJECTION OF ANESTHETIC AGENT AROUND MEDIAL BRANCH NERVES INNERVATING LUMBAR FACET JOINT Bilateral 2022    Procedure: Block-nerve-medial branch-lumbar bilaterl L4-5 and L5-S1;  Surgeon: Alireza Meraz Jr., MD;  Location: [facility] PAIN MGT;  Service: Pain  Management;  Laterality: Bilateral;  pt is diabetic     INJECTION OF FACET JOINT Bilateral 5/6/2021    Procedure: INJECTION, FACET JOINT--Bilateral L4-5, L5-S1;  Surgeon: Alireza Meraz Jr., MD;  Location: [facility] PAIN MGT;  Service: Pain Management;  Laterality: Bilateral;  Oral    INTERPOSITION ARTHROPLASTY OF CARPOMETACARPAL JOINTS Left 1/8/2021    Procedure: INTERPOSITION ARTHROPLASTY, CMC JOINT - left dequervains and cmc arthroplasty, arthrex;  Surgeon: Marky Hagen MD;  Location: [facility] OR;  Service: Orthopedics;  Laterality: Left;    JOINT REPLACEMENT Left     Pan Retinal Photocoagulation Bilateral     Dr. Monterroso (Proliferative Diabetic Retinopathy)    RADIOFREQUENCY THERMOCOAGULATION Right 5/12/2022    Procedure: RADIOFREQUENCY THERMAL COAGULATION RIGHT L4-5, L5-S1 (IV Sedation);  Surgeon: Alireza Meraz Jr., MD;  Location: [facility] PAIN MGT;  Service: Pain Management;  Laterality: Right;  diabetic    RADIOFREQUENCY THERMOCOAGULATION Left 5/19/2022    Procedure: RADIOFREQUENCY THERMAL COAGULATION LEFT L4-5, L5-S1 (IV Sedation);  Surgeon: Alireza Meraz Jr., MD;  Location: [facility] PAIN MGT;  Service: Pain Management;  Laterality: Left;  diabetic    TOTAL ABDOMINAL HYSTERECTOMY  1982    TOTAL KNEE ARTHROPLASTY  2/2006    left    TRIGGER FINGER RELEASE  9/18/2009     Family History   Problem Relation Age of Onset    Diabetes Mother     Hypertension Mother     Heart disease Father     Diabetes Sister     No Known Problems Sister     No Known Problems Brother     Thyroid disease Brother     Diabetes Brother     Hypertension Brother     No Known Problems Daughter     No Known Problems Daughter     No Known Problems Son     Amblyopia Neg Hx     Blindness Neg Hx     Glaucoma Neg Hx     Macular degeneration Neg Hx     Retinal detachment Neg Hx     Strabismus Neg Hx     Colon cancer Neg Hx     Esophageal cancer Neg Hx      Social History     Tobacco Use    Smoking status: Never    Smokeless tobacco: Never   Substance  Use Topics    Alcohol use: No    Drug use: No     Review of Systems   Constitutional:  Negative for fever.   HENT:  Negative for sore throat.    Respiratory:  Negative for shortness of breath.    Cardiovascular:  Negative for chest pain.   Gastrointestinal:  Negative for nausea.   Genitourinary:  Negative for dysuria.   Musculoskeletal:  Positive for arthralgias (R shoulder, wrist, hip (mild)). Negative for back pain and neck pain.        Negative for right calf and ankle pain   Skin:  Negative for rash.   Neurological:  Negative for weakness.   Hematological:  Does not bruise/bleed easily.     Physical Exam     Initial Vitals [02/28/23 1005]   BP Pulse Resp Temp SpO2   132/61 (!) 58 18 97.7 °F (36.5 °C) 100 %      MAP       --         Physical Exam    Nursing note and vitals reviewed.      Appearance: No acute distress.  Skin: No rashes seen.  Good turgor.  No abrasions.  No ecchymoses. Cap refill <2 seconds.  Eyes: No conjunctival injection.  ENT: Oropharynx clear.    Chest: Clear to auscultation bilaterally.  Good air movement.  No wheezes.  No rhonchi.  Cardiovascular: Regular rate and rhythm. No murmurs. No gallops. No rubs. 2+ radial pulses  Abdomen: Soft.  Not distended.  Nontender.  No guarding.  No rebound.  Musculoskeletal: Tenderness around distal right clavicle/ AC joint on the right. Tenderness to palpation in proximal right humerus. Full ROM passively over right shoulder with pain. Restriction with active due to pain. No bony tenderness over right wrist or hand, full ROM of both. Tenderness to palpation over superior aspect of right patella. No midline, C-spine tenderness to palpation.  Negative straight leg raise. No deformities.  Neck supple.  No meningismus.  Neurologic: GCS 15. Motor intact.  Sensation intact.  Cerebellar intact.  Cranial nerves intact. Able to fully bear, ambulate  Mental Status:  Alert and oriented x 3.  Appropriate, conversant.    ED Course   Procedures  Labs Reviewed    HEPATITIS C ANTIBODY   HIV 1 / 2 ANTIBODY          Imaging Results              X-Ray Knee 3 View Right (Final result)  Result time 02/28/23 12:16:48      Final result by Chad Le III, MD (02/28/23 12:16:48)                   Narrative:    EXAMINATION:  XR KNEE 3 VIEW RIGHT    CLINICAL HISTORY:  Strain of unspecified muscle(s) and tendon(s) at lower leg level, right leg, initial encounter    FINDINGS:  Knee three views right: There is DJD and a varus deformity.  No fracture dislocation bone destruction seen.      Electronically signed by: Chad Le MD  Date:    02/28/2023  Time:    12:16                                     X-Ray Shoulder Trauma Right (Final result)  Result time 02/28/23 12:09:31      Final result by Chad Le III, MD (02/28/23 12:09:31)                   Impression:      No acute process seen.      Electronically signed by: Chad Le MD  Date:    02/28/2023  Time:    12:09               Narrative:    EXAMINATION:  XR SHOULDER TRAUMA 3 VIEW RIGHT    CLINICAL HISTORY:  Pain in unspecified shoulder    FINDINGS:  Right shoulder: There is chronic calcific rotator cuff tendinitis.  There is DJD.  No acute trauma seen.                                       Medications - No data to display  Medical Decision Making:   History:   Old Medical Records: I decided to obtain old medical records.  ED Management:  Patient presents the ED after trip and fall at home on Sunday.  Patient denied any LOC. she complains of right shoulder right hand and right knee pain.  Patient is able to full weightbear ambulate and has since the accident occurred on Sunday.  At baseline she has severe degenerative changes in her right knee for which she will require a right total knee arthroplasty, but she said she has to lose weight before she can have this done.  She is otherwise fully alert with no signs of head trauma.  Her exam is consistent with right proximal humerus/AC tenderness to palpation, and  right patella tenderness to palpation.  Likelihood of fracture dislocation is low, but x-rays were done of both and they were negative for any acute fractures dislocation.  I discussed given patient's steroid shot, as she can not tolerate NSAIDs due to renal disease.  However we decided against this as it would raise her blood sugar, and she was not not much pain.  She was discharged home in stable condition and will return for any concerns or worsening condition.        Scribe Attestation:   Scribe #1: I performed the above scribed service and the documentation accurately describes the services I performed. I attest to the accuracy of the note.                   Clinical Impression:   Final diagnoses:  [M25.519] Shoulder pain, acute  [S86.911A] Knee strain, right, initial encounter  [S80.01XA] Contusion of right knee, initial encounter (Primary)        ED Disposition Condition    Discharge Stable          ED Prescriptions    None       Follow-up Information    None          Kodi Jacob III, MD  02/28/23 [id number]

## 2023-02-28 NOTE — PROGRESS NOTES
HPI    Patient here today for yearly.    Patient c/o occasional blurry VA ou, no pain, tearing OS and floaters w/o   flashes.    No gtts  Last edited by Katie Lopez on 2/28/2023  8:05 AM.            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 Glover       2-PDR OU:  T2 uncontrolled on insulin   -S/P PRP OU   -PRP OS most recently 11/25/13 with new subhyaloid heme then, got Avastin 11/13/13   -Last HbA1c was 8.6 on 2/9/15   -Emphasized the importance of tight glucose control    3-ERM OU:   -Good VA, monitor    4-PCIOL OU:   -2002 OS by Rosendo   -2012 OD by Dr. Glover        F/U 1 year  OCT

## 2023-03-03 ENCOUNTER — TELEPHONE (OUTPATIENT)
Dept: ENDOSCOPY | Facility: HOSPITAL | Age: 73
End: 2023-03-03

## 2023-03-03 DIAGNOSIS — R13.19 ESOPHAGEAL DYSPHAGIA: ICD-10-CM

## 2023-03-03 DIAGNOSIS — K63.5 POLYP OF COLON, UNSPECIFIED PART OF COLON, UNSPECIFIED TYPE: Primary | ICD-10-CM

## 2023-03-03 DIAGNOSIS — Z12.11 SPECIAL SCREENING FOR MALIGNANT NEOPLASMS, COLON: ICD-10-CM

## 2023-03-03 RX ORDER — SODIUM, POTASSIUM,MAG SULFATES 17.5-3.13G
1 SOLUTION, RECONSTITUTED, ORAL ORAL DAILY
Qty: 1 KIT | Refills: 0 | Status: SHIPPED | OUTPATIENT
Start: 2023-03-03 | End: 2023-03-05

## 2023-03-03 NOTE — TELEPHONE ENCOUNTER
Annika calling c/o right knee pain, unable to stand and increased pain when tries to walk. States she saw a doctor about pain and knows she needs surgery. Denies recent injury. Swelling below knee cap. States lower leg/calf swelling present since Sunday. Warm to touch. Pain unrelieved ice, elevation and tylenol. Advised per triage protocol to go to nearest ED now for physician eval. V/u.  Reason for Disposition   Thigh or calf pain and only 1 side and present > 1 hour    Additional Information   Negative: Sounds like a life-threatening emergency to the triager   Negative: Followed a knee injury   Negative: Swollen knee joint and fever    Protocols used: Knee Pain-A-[place]     no

## 2023-03-14 ENCOUNTER — OFFICE VISIT (OUTPATIENT)
Dept: PODIATRY | Facility: CLINIC | Age: 73
End: 2023-03-14
Payer: MEDICARE

## 2023-03-14 VITALS
DIASTOLIC BLOOD PRESSURE: 58 MMHG | BODY MASS INDEX: 48.82 KG/M2 | HEIGHT: 65 IN | HEART RATE: 67 BPM | SYSTOLIC BLOOD PRESSURE: 123 MMHG | WEIGHT: 293 LBS

## 2023-03-14 DIAGNOSIS — E11.42 DIABETIC POLYNEUROPATHY ASSOCIATED WITH TYPE 2 DIABETES MELLITUS: Primary | ICD-10-CM

## 2023-03-14 DIAGNOSIS — B35.1 ONYCHOMYCOSIS DUE TO DERMATOPHYTE: ICD-10-CM

## 2023-03-14 PROCEDURE — 3074F SYST BP LT 130 MM HG: CPT | Mod: CPTII,S$GLB,, | Performed by: PODIATRIST

## 2023-03-14 PROCEDURE — 1159F PR MEDICATION LIST DOCUMENTED IN MEDICAL RECORD: ICD-10-PCS | Mod: CPTII,S$GLB,, | Performed by: PODIATRIST

## 2023-03-14 PROCEDURE — 3008F PR BODY MASS INDEX (BMI) DOCUMENTED: ICD-10-PCS | Mod: CPTII,S$GLB,, | Performed by: PODIATRIST

## 2023-03-14 PROCEDURE — 99214 OFFICE O/P EST MOD 30 MIN: CPT | Mod: 25,S$GLB,, | Performed by: PODIATRIST

## 2023-03-14 PROCEDURE — 1159F MED LIST DOCD IN RCRD: CPT | Mod: CPTII,S$GLB,, | Performed by: PODIATRIST

## 2023-03-14 PROCEDURE — 1125F AMNT PAIN NOTED PAIN PRSNT: CPT | Mod: CPTII,S$GLB,, | Performed by: PODIATRIST

## 2023-03-14 PROCEDURE — 3008F BODY MASS INDEX DOCD: CPT | Mod: CPTII,S$GLB,, | Performed by: PODIATRIST

## 2023-03-14 PROCEDURE — 3078F DIAST BP <80 MM HG: CPT | Mod: CPTII,S$GLB,, | Performed by: PODIATRIST

## 2023-03-14 PROCEDURE — 3066F NEPHROPATHY DOC TX: CPT | Mod: CPTII,S$GLB,, | Performed by: PODIATRIST

## 2023-03-14 PROCEDURE — 4010F ACE/ARB THERAPY RXD/TAKEN: CPT | Mod: CPTII,S$GLB,, | Performed by: PODIATRIST

## 2023-03-14 PROCEDURE — 3060F POS MICROALBUMINURIA REV: CPT | Mod: CPTII,S$GLB,, | Performed by: PODIATRIST

## 2023-03-14 PROCEDURE — 4010F PR ACE/ARB THEARPY RXD/TAKEN: ICD-10-PCS | Mod: CPTII,S$GLB,, | Performed by: PODIATRIST

## 2023-03-14 PROCEDURE — 11721 PR DEBRIDEMENT OF NAILS, 6 OR MORE: ICD-10-PCS | Mod: Q9,S$GLB,, | Performed by: PODIATRIST

## 2023-03-14 PROCEDURE — 3078F PR MOST RECENT DIASTOLIC BLOOD PRESSURE < 80 MM HG: ICD-10-PCS | Mod: CPTII,S$GLB,, | Performed by: PODIATRIST

## 2023-03-14 PROCEDURE — 11721 DEBRIDE NAIL 6 OR MORE: CPT | Mod: Q9,S$GLB,, | Performed by: PODIATRIST

## 2023-03-14 PROCEDURE — 3060F PR POS MICROALBUMINURIA RESULT DOCUMENTED/REVIEW: ICD-10-PCS | Mod: CPTII,S$GLB,, | Performed by: PODIATRIST

## 2023-03-14 PROCEDURE — 99214 PR OFFICE/OUTPT VISIT, EST, LEVL IV, 30-39 MIN: ICD-10-PCS | Mod: 25,S$GLB,, | Performed by: PODIATRIST

## 2023-03-14 PROCEDURE — 3051F HG A1C>EQUAL 7.0%<8.0%: CPT | Mod: CPTII,S$GLB,, | Performed by: PODIATRIST

## 2023-03-14 PROCEDURE — 3074F PR MOST RECENT SYSTOLIC BLOOD PRESSURE < 130 MM HG: ICD-10-PCS | Mod: CPTII,S$GLB,, | Performed by: PODIATRIST

## 2023-03-14 PROCEDURE — 99999 PR PBB SHADOW E&M-EST. PATIENT-LVL IV: ICD-10-PCS | Mod: PBBFAC,,, | Performed by: PODIATRIST

## 2023-03-14 PROCEDURE — 1125F PR PAIN SEVERITY QUANTIFIED, PAIN PRESENT: ICD-10-PCS | Mod: CPTII,S$GLB,, | Performed by: PODIATRIST

## 2023-03-14 PROCEDURE — 3051F PR MOST RECENT HEMOGLOBIN A1C LEVEL 7.0 - < 8.0%: ICD-10-PCS | Mod: CPTII,S$GLB,, | Performed by: PODIATRIST

## 2023-03-14 PROCEDURE — 3066F PR DOCUMENTATION OF TREATMENT FOR NEPHROPATHY: ICD-10-PCS | Mod: CPTII,S$GLB,, | Performed by: PODIATRIST

## 2023-03-14 PROCEDURE — 99999 PR PBB SHADOW E&M-EST. PATIENT-LVL IV: CPT | Mod: PBBFAC,,, | Performed by: PODIATRIST

## 2023-03-14 NOTE — PROGRESS NOTES
Subjective:      Patient ID: Annika Mac is a 72 y.o. female.    Chief Complaint: Diabetes Mellitus and Ankle Pain (Right ankle pain for abt/)      Annika is a 72 y.o. female who presents to the clinic for evaluation and treatment of high risk feet. Annika has a past medical history of Asthma, Chronic obstructive pulmonary disease, unspecified COPD type (1/14/2022), Constipation (10/3/2019), Deep vein thrombophlebitis of left leg (7/27/2012), Deep vein thrombosis, Diabetic foot ulcer with osteomyelitis, Encounter for blood transfusion, GERD (gastroesophageal reflux disease), Obesity, diabetes, and hypertension syndrome (2/13/2019), Obstructive sleep apnea (7/27/2012), PAD (peripheral artery disease) (11/21/2013), Pressure ulcer of toe of left foot, Type 2 diabetes mellitus with obesity (8/19/2020), and Type 2 diabetes mellitus with peripheral artery disease (8/19/2020). The patient's chief complaint is long, thick toenails. This patient has documented high risk feet requiring routine maintenance secondary to peripheral neuropathy.    PCP: Mamie Cotton MD    Date Last Seen by PCP:   Chief Complaint   Patient presents with    Diabetes Mellitus    Ankle Pain     Right ankle pain for abt           Current shoe gear:  Affected Foot: Rx diabetic extra depth shoes and custom accommodative insoles     Unaffected Foot: Rx diabetic extra depth shoes and custom accommodative insoles    Hemoglobin A1C   Date Value Ref Range Status   01/05/2023 7.7 (H) 4.0 - 5.6 % Final     Comment:     ADA Screening Guidelines:  5.7-6.4%  Consistent with prediabetes  >or=6.5%  Consistent with diabetes    High levels of fetal hemoglobin interfere with the HbA1C  assay. Heterozygous hemoglobin variants (HbS, HgC, etc)do  not significantly interfere with this assay.   However, presence of multiple variants may affect accuracy.     08/24/2022 7.4 (H) 4.0 - 5.6 % Final     Comment:     ADA Screening Guidelines:  5.7-6.4%  Consistent with  prediabetes  >or=6.5%  Consistent with diabetes    High levels of fetal hemoglobin interfere with the HbA1C  assay. Heterozygous hemoglobin variants (HbS, HgC, etc)do  not significantly interfere with this assay.   However, presence of multiple variants may affect accuracy.     04/11/2022 8.1 (H) 4.0 - 5.6 % Final     Comment:     ADA Screening Guidelines:  5.7-6.4%  Consistent with prediabetes  >or=6.5%  Consistent with diabetes    High levels of fetal hemoglobin interfere with the HbA1C  assay. Heterozygous hemoglobin variants (HbS, HgC, etc)do  not significantly interfere with this assay.   However, presence of multiple variants may affect accuracy.         Review of Systems   Constitutional: Negative for chills, fever and malaise/fatigue.   HENT:  Negative for hearing loss.    Cardiovascular:  Positive for leg swelling. Negative for claudication.   Respiratory:  Negative for shortness of breath.    Skin:  Positive for color change, dry skin, nail changes and unusual hair distribution. Negative for flushing and rash.   Musculoskeletal:  Negative for joint pain and myalgias.   Neurological:  Positive for numbness, paresthesias and sensory change. Negative for loss of balance.   Psychiatric/Behavioral:  Negative for altered mental status.          Objective:      Physical Exam  Vitals reviewed.   Constitutional:       Appearance: She is well-developed.   Cardiovascular:      Pulses:           Dorsalis pedis pulses are 0 on the right side and 0 on the left side.        Posterior tibial pulses are 1+ on the right side and 1+ on the left side.   Musculoskeletal:      Right lower leg: Edema present.      Left lower leg: Edema present.      Right ankle: Decreased range of motion. Abnormal pulse.      Right Achilles Tendon: Patel's test negative.      Left ankle: Decreased range of motion. Abnormal pulse.      Left Achilles Tendon: Patel's test negative.      Right foot: Decreased range of motion.      Left foot:  Decreased range of motion.   Feet:      Right foot:      Protective Sensation: 5 sites tested.  2 sites sensed.      Left foot:      Protective Sensation: 5 sites tested.  2 sites sensed.   Skin:     General: Skin is dry.      Capillary Refill: Capillary refill takes more than 3 seconds.      Comments: Nails x10 are elongated by  4-5mm's, thickened by 2-3 mm's, dystrophic, and are yellowish in  coloration . Xerosis Bilaterally. No open lesions noted.    Neurological:      Mental Status: She is alert.      Comments: diminished sensation noted to b/L lower extremities   Psychiatric:         Behavior: Behavior normal. Behavior is cooperative.             Assessment:       Encounter Diagnoses   Name Primary?    Diabetic polyneuropathy associated with type 2 diabetes mellitus Yes    Onychomycosis due to dermatophyte          Plan:       Annika was seen today for diabetes mellitus and ankle pain.    Diagnoses and all orders for this visit:    Diabetic polyneuropathy associated with type 2 diabetes mellitus  -     DIABETIC SHOES FOR HOME USE    Onychomycosis due to dermatophyte    I counseled the patient on her conditions, their implications and medical management.    Pt advised to wear compression stockings for lower extremity swelling.   Rx diabetic shoes    - Shoe inspection. Diabetic Foot Education. Patient reminded of the importance of good nutrition and blood sugar control to help prevent podiatric complications of diabetes. Patient instructed on proper foot hygeine. We discussed wearing proper shoe gear, daily foot inspections, never walking without protective shoe gear, never putting sharp instruments to feet, routine podiatric nail visits every 2-3 months.      - With patient's permission, nails were aggressively reduced and debrided x 10 to their soft tissue attachment mechanically and with electric , removing all offending nail and debris. Patient relates relief following the procedure. She will continue to  monitor the areas daily, inspect her feet, wear protective shoe gear when ambulatory, moisturizer to maintain skin integrity and follow in this office in approximately 2-3 months, sooner p.r.n.

## 2023-03-21 NOTE — PROGRESS NOTES
SUBJECTIVE:     Chief Complaint & History of Present Illness:  Annika Mac is a  Established  patient 67 y.o. female who is seen here today with a complaint of    Chief Complaint   Patient presents with    Right Ankle - Pain    .  He is here today for continuing care for her right ankle pain. She was seen by me for this 05/07/2018 and had been recovering very well.  She suffered a recurrent injury of the ankle when she fell from her bed a few days ago she has developed swelling and pain difficulty with weight-bearing.  She has placed herself back in a lace-up ankle brace which has provided some measure of relief she is here today for further evaluation treatment  On a scale of 1-10, with 10 being worst pain imaginable, he rates this pain as 3 on good days and 8 on bad days.  she describes the pain as tender and sore    Review of patient's allergies indicates:   Allergen Reactions    Iodinated contrast- oral and iv dye Rash         Current Outpatient Medications   Medication Sig Dispense Refill    amLODIPine (NORVASC) 5 MG tablet Take 1 tablet (5 mg total) by mouth once daily. 90 tablet 1    aspirin 81 MG Chew Take 1 tablet (81 mg total) by mouth once daily.  0    blood-glucose meter kit Use as instructed, true test/result meter 1 each 0    DULoxetine (CYMBALTA) 30 MG capsule Take 1 capsule (30 mg total) by mouth once daily. 90 capsule 1    famotidine (PEPCID) 20 MG tablet Take 1 tablet (20 mg total) by mouth 2 (two) times daily. 1 tablet 0    ferrous sulfate 325 mg (65 mg iron) Tab tablet Take 1 tablet (325 mg total) by mouth 2 (two) times daily.  0    fexofenadine (ALLEGRA) 180 MG tablet TAKE 1 TABLET BY MOUTH ONCE DAILY 30 tablet 0    furosemide (LASIX) 20 MG tablet Take 1 tablet (20 mg total) by mouth once daily. Take an extra pill if short of breath or leg swelling 120 tablet 3    insulin regular hum U-500 conc (HUMULIN R U-500, CONC, KWIKPEN) 500 unit/mL (3 mL) InPn Inject  Units into the  Specialty Pharmacy - Clinical Reassessment    Specialty Medication Orders Linked to Encounter      Flowsheet Row Most Recent Value   Medication #1 ibrutinib (IMBRUVICA) 140 mg Cap (Order#[id number], Rx#[id number])          Patient Diagnosis   C91.10 - CLL (chronic lymphocytic leukemia)    Kevin Echeverria is a 81 y.o. male, who is followed by the specialty pharmacy service for management and education of his Imbruvica.  He has been on therapy with Imbruvica since 3/13/2021.  I have reviewed his electronic medical record and current medication list and determined that specialty medication adjustment Is not needed at this time.    Patient has not experienced adverse events.  He Is adherent reporting 0 missed doses since last review.  Adherence has been encouraged with the following mechanism(s): directed education.  He is meeting goals of therapy and will continue treatment.        3/1/2023 2/6/2023 1/9/2023 12/9/2022 11/10/2022 10/11/2022 9/21/2022   Follow Up Review   # of missed doses 0 0 0 0 0 0    New Medications? No No No No No No    New Conditions? No No No No No No    New Allergies? No No No No No No    Med Effective? Good Very good Good Good Good Excellent    Missed activities?       No   Urgent Care? No No No No No No    Requested Pharmacist? No No No No No No             Therapy is appropriate to continue.    Therapy is effective: Yes  On scale of 1 to 10, how does patient rank quality of life? (10 - Best): Unable to Assess  Recommendations: none at this time.  Review Method: Chart Review    Mr. Echeverria was last seen on 10/24/2022. Per notes, he is doing well on imbruvica. He will continue therapy. Patient is receiving monthly CBC at [facility]. Patient's next labs is scheduled on 4/18 and follow up on 4/19.     [facility] confirmed patient's last lab was drawn 10/22. I will message Dr. Quintana to ensure pt gets labs.     Tasks added this encounter   No tasks added.   Tasks due within next 3 months   3/13/2023 -  "skin 3 (three) times daily before meals. Take 115u with breakfast,85u with lunch, and 50u with dinner. 11 Syringe 4    irbesartan (AVAPRO) 300 MG tablet Take 0.5 tablets (150 mg total) by mouth every evening. 90 tablet 3    labetalol (NORMODYNE) 300 MG tablet Take 1 tablet (300 mg total) by mouth 2 (two) times daily. 180 tablet 3    lancets 31 gauge Misc 1 lancet by Misc.(Non-Drug; Combo Route) route 4 (four) times daily. 150 each 6    liraglutide 0.6 mg/0.1 mL, 18 mg/3 mL, subq PNIJ 0.6 mg/0.1 mL (18 mg/3 mL) PnIj Inject 0.6 mg daily into the skin for 2 weeks then increase to 1.2 mg thereafter daily. 3 mL 11    pen needle, diabetic 32 gauge x 5/32" Ndle Use with multiple daily insulin injections 400 each 12    polyethylene glycol (COLYTE) 240-22.72-6.72 -5.84 gram SolR Drink half a bottle:Take 8 oz q 15 minutes until half a bottle is complete, as directed. 4000 mL 0    pregabalin (LYRICA) 100 MG capsule TAKE 1 CAPSULE BY MOUTH TWO TIMES DAILY 180 capsule 1    simvastatin (ZOCOR) 40 MG tablet Take 0.5 tablets (20 mg total) by mouth every evening. 90 tablet 1    spironolactone (ALDACTONE) 25 MG tablet Take 0.5 tablets (12.5 mg total) by mouth once daily. 90 tablet 2    TRUETEST TEST STRIPS Strp 1 strip by Misc.(Non-Drug; Combo Route) route 4 (four) times daily. 400 strip 4    diclofenac sodium 1 % Gel Apply 2 g topically 3 (three) times daily. for 10 days 400 g 0     No current facility-administered medications for this visit.        Past Medical History:   Diagnosis Date    Allergy     Anemia 7/27/2012    Arthritis     CHF (congestive heart failure)     CKD (chronic kidney disease) stage 3, GFR 30-59 ml/min 7/27/2012    Clotting disorder     Colon polyp     Deep vein thrombophlebitis of left leg 7/27/2012    Degenerative disc disease     Diabetic peripheral neuropathy associated with type 2 diabetes mellitus 7/27/2012    GERD (gastroesophageal reflux disease)     HTN (hypertension) 7/27/2012 " Clinical - Follow Up Assesement (180 day)  3/25/2023 - Refill Call (Auto Added)     KARRI MINER, PharmD  [facility]  [address]  [facility] [address]  Phone: [phone]  Fax: [phone]   "   Hyperlipidemia 2012    Lead-induced gout of ankle or foot     right going on three weeks    Nonproliferative diabetic retinopathy of right eye 2012    Obstructive sleep apnea 2012    Osteoporosis     Proliferative diabetic retinopathy of left eye 2012    Stroke 2003    Type 2 diabetes mellitus with diabetic polyneuropathy 2012    Ulcer        Past Surgical History:   Procedure Laterality Date    CATARACT EXTRACTION W/  INTRAOCULAR LENS IMPLANT Left 3/2002    left     CATARACT EXTRACTION W/  INTRAOCULAR LENS IMPLANT Right     OD dr. olivares     SECTION      CYST REMOVAL  2011    left side of face    EYE SURGERY Bilateral 2012    eye implants    GANGLION CYST EXCISION  2007    Right wrist    Pan Retinal Photocoagulation Bilateral     Dr. Monterroso (Proliferative Diabetic Retinopathy)    TOTAL ABDOMINAL HYSTERECTOMY  1982    TOTAL KNEE ARTHROPLASTY  2006    left    TRIGGER FINGER RELEASE  2009       Vital Signs (Most Recent)  Vitals:    18 0941   BP: (!) 148/71   Pulse: 78       Review of Systems:  ROS:  Constitutional: no fever or chills  Eyes: no visual changes  ENT: no nasal congestion or sore throat  Respiratory: no cough or shortness of breath  Cardiovascular: no chest pain or palpitations, CHF, CAD, stroke  Gastrointestinal: no nausea or vomiting, tolerating diet, GERD  Genitourinary: no hematuria or dysuria, chronic kidney disease stage III  Integument/Breast: no rash or pruritis  Hematologic/Lymphatic: no easy bruising or lymphadenopathy  Musculoskeletal: positive for arthralgias, back pain, myalgias, neck pain, stiff joints and gouty arthritis, negative for muscle weakness  Neurological: no seizures or tremors  Behavioral/Psych: no auditory or visual hallucinations  Endocrine: no heat or cold intolerance, diabetes type 2 with neuropathy        OBJECTIVE:     PHYSICAL EXAM:  Height: 5' 5" (165.1 cm)  , General " Appearance: Well nourished, well developed, in no acute distress.  Neurological: Mood & affect are normal.  right  Foot/Ankle     Skin: normal  Swelling: mild   Warmth: no warmth  Tenderness: moderate  ROM: 90 degrees dorsiflexion, 170 degrees plantarflexion, 30 degrees inversion and 30 degrees eversion  Strength: limited by pain  Gait: antalgic  Stability: anterior drawer: positive, exterior rotation test: negative, Lachman: positive and Cotton test: negative      tenderness to palpation at the talo fibular ligament and the tibionavicular ligament      RADIOGRAPHS:  X-rays taken today films reviewed by me demonstrate no evidence of fracture dislocation or about the ankle mortise is well maintained unchanged from previous x-rays    ASSESSMENT/PLAN:     Plan:  Patient will continue with the lace-up ankle brace for support and protection.  Diclofenac 1% gel to affected area up to t.i.d. p.r.n. for pain and inflammation.  Patient was advised to use the medication sparingly secondary to her kidney issues and only for the minimum period of time to obtain a relief of her pain

## 2023-04-13 ENCOUNTER — TELEPHONE (OUTPATIENT)
Dept: ENDOCRINOLOGY | Facility: CLINIC | Age: 73
End: 2023-04-13
Payer: MEDICARE

## 2023-04-13 NOTE — TELEPHONE ENCOUNTER
----- Message from Vangie Rodarte MA sent at 4/12/2023  4:55 PM CDT -----  Regarding: FW: appt; 4 month f/u in August  Contact: PT @ [phone]    ----- Message -----  From: Michael Hicks  Sent: 4/12/2023   4:05 PM CDT  To: [facility] Staff  Subject: appt; 4 month f/u in August                      Pt called in to schedule an appt; no available appts in Epic. Pt is asking for a call back soon to schedule. Thanks.     Reason appt not schedule: none available; pt needs to be seen in August. Pt states that she is doing fine right now.     Patient's DX: Diabetic, Type 2. Also needs to schedule for labs; active order in Epic.    Patient requesting call back or [facility] msg: Call back. Thanks.

## 2023-04-14 DIAGNOSIS — E11.42 TYPE 2 DIABETES MELLITUS WITH DIABETIC POLYNEUROPATHY, WITH LONG-TERM CURRENT USE OF INSULIN: ICD-10-CM

## 2023-04-14 DIAGNOSIS — Z79.4 TYPE 2 DIABETES MELLITUS WITH DIABETIC POLYNEUROPATHY, WITH LONG-TERM CURRENT USE OF INSULIN: ICD-10-CM

## 2023-04-14 RX ORDER — INSULIN DEGLUDEC 100 U/ML
40 INJECTION, SOLUTION SUBCUTANEOUS DAILY
Qty: 15 PEN | Refills: 3 | Status: SHIPPED | OUTPATIENT
Start: 2023-04-14 | End: 2023-08-03

## 2023-04-17 NOTE — PROGRESS NOTES
INTERNAL MEDICINE CLINIC - SAME DAY APPOINTMENT  Progress Note    PRESENTING HISTORY     PCP: Mamie Cotton MD    Chief Complaint/Reason for Visit:   No chief complaint on file.    History of Present Illness & ROS : Ms. Annika Mac is a 72 y.o. female.    Same day apt  PCP is out of the practice and has been rescheduled with KATIE today.   Very pleasant lady. Doing well. Request refills on several medications today.     Review of Systems:  Eyes: denies visual changes at this time denies floaters   ENT: no nasal congestion or sore throat  Respiratory: no cough or shorness of breath  Cardiovascular: no chest pain or palpitations  Gastrointestinal: no nausea or vomiting, no abdominal pain or change in bowel habits  Genitourinary: no hematuria or dysuria; denies frequency  Hematologic/Lymphatic: no easy bruising or lymphadenopathy  Musculoskeletal: no arthralgias or myalgias  Neurological: no seizures or tremors  Endocrine: no heat or cold intolerance          PAST HISTORY:     Past Medical History:   Diagnosis Date    Asthma     Chronic obstructive pulmonary disease, unspecified COPD type 2022    Constipation 10/3/2019    Deep vein thrombophlebitis of left leg 2012    Deep vein thrombosis     when she had a knee replacement    Diabetic foot ulcer with osteomyelitis     Encounter for blood transfusion     anemic     GERD (gastroesophageal reflux disease)     Obesity, diabetes, and hypertension syndrome 2019    Obstructive sleep apnea 2012    PAD (peripheral artery disease) 2013    Pressure ulcer of toe of left foot     Type 2 diabetes mellitus with obesity 2020    Type 2 diabetes mellitus with peripheral artery disease 2020       Past Surgical History:   Procedure Laterality Date    CATARACT EXTRACTION W/  INTRAOCULAR LENS IMPLANT Left 3/2002    left     CATARACT EXTRACTION W/  INTRAOCULAR LENS IMPLANT Right     OD dr. olivares     SECTION      COLONOSCOPY  N/A 2/26/2018    Procedure: COLONOSCOPY;  Surgeon: Faizan Mac MD;  Location: [facility] ENDO (2ND FLR);  Service: Endoscopy;  Laterality: N/A;    CYST REMOVAL  2/11/2011    left side of face    CYSTOSCOPY W/ RETROGRADES Left 2/26/2020    Procedure: CYSTOSCOPY, WITH RETROGRADE PYELOGRAM;  Surgeon: Noman Rivas MD;  Location: [facility] OR 1ST FLR;  Service: Urology;  Laterality: Left;  30min    DE QUERVAIN'S RELEASE  1/8/2021    Procedure: RELEASE, HAND, FOR DEQUERVAIN'S TENOSYNOVITIS;  Surgeon: Marky Hagen MD;  Location: [facility];  Service: Orthopedics;;    EYE SURGERY Bilateral 2012    eye implants    GANGLION CYST EXCISION  11/13/2007    Right wrist    INJECTION OF ANESTHETIC AGENT AROUND MEDIAL BRANCH NERVES INNERVATING LUMBAR FACET JOINT Bilateral 4/5/2022    Procedure: Block-nerve-medial branch-lumbar bilateral L4-5 and L5-S1;  Surgeon: Alireza Meraz Jr., MD;  [place] [facility] [place]  Service: Pain Management;  Laterality: Bilateral;  pt is diabetic   asa    INJECTION OF ANESTHETIC AGENT AROUND MEDIAL BRANCH NERVES INNERVATING LUMBAR FACET JOINT Bilateral 4/19/2022    Procedure: Block-nerve-medial branch-lumbar bilaterl L4-5 and L5-S1;  Surgeon: Alireza Meraz Jr., MD;  Location: [facility];  Service: Pain Management;  Laterality: Bilateral;  pt is diabetic     INJECTION OF FACET JOINT Bilateral 5/6/2021    Procedure: INJECTION, FACET JOINT--Bilateral L4-5, L5-S1;  Surgeon: Alireza Meraz Jr., MD;  [place] [facility]  Service: Pain Management;  Laterality: Bilateral;  Oral    INTERPOSITION ARTHROPLASTY OF CARPOMETACARPAL JOINTS Left 1/8/2021    Procedure: INTERPOSITION ARTHROPLASTY, CMC JOINT - left dequervains and cmc arthroplasty, arthrex;  Surgeon: Marky Hagen MD;  Location: [facility];  Service: Orthopedics;  Laterality: Left;    JOINT REPLACEMENT Left     Pan Retinal Photocoagulation Bilateral     Dr. Monterroso (Proliferative Diabetic Retinopathy)    RADIOFREQUENCY THERMOCOAGULATION Right  5/12/2022    Procedure: RADIOFREQUENCY THERMAL COAGULATION RIGHT L4-5, L5-S1 (IV Sedation);  Surgeon: Alireza Meraz Jr., MD;  Location: [facility] PAIN [facility]GT;  Service: Pain Management;  Laterality: Right;  diabetic    RADIOFREQUENCY THERMOCOAGULATION Left 5/19/2022    Procedure: RADIOFREQUENCY THERMAL COAGULATION LEFT L4-5, L5-S1 (IV Sedation);  Surgeon: Alireza Meraz Jr., MD;  Location: [facility];  Service: Pain Management;  Laterality: Left;  diabetic    TOTAL ABDOMINAL HYSTERECTOMY  1982    TOTAL KNEE ARTHROPLASTY  2/2006    left    TRIGGER FINGER RELEASE  9/18/2009       Family History   Problem Relation Age of Onset    Diabetes Mother     Hypertension Mother     Heart disease Father     Diabetes Sister     No Known Problems Sister     No Known Problems Brother     Thyroid disease Brother     Diabetes Brother     Hypertension Brother     No Known Problems Daughter     No Known Problems Daughter     No Known Problems Son     Amblyopia Neg Hx     Blindness Neg Hx     Glaucoma Neg Hx     Macular degeneration Neg Hx     Retinal detachment Neg Hx     Strabismus Neg Hx     Colon cancer Neg Hx     Esophageal cancer Neg Hx        Social History     Socioeconomic History    Marital status:      Spouse name: Heber    Number of children: 3   Occupational History    Occupation:    Tobacco Use    Smoking status: Never    Smokeless tobacco: Never   Substance and Sexual Activity    Alcohol use: No    Drug use: No    Sexual activity: Yes     Partners: Male   Social History Narrative    , lives with family; 3 children, 4 grandchildren     Social Determinants of Health     Financial Resource Strain: Low Risk     Difficulty of Paying Living Expenses: Not very hard   Food Insecurity: No Food Insecurity    Worried About Running Out of Food in the Last Year: Never true    Ran Out of Food in the Last Year: Never true   Transportation Needs: No Transportation Needs    Lack of Transportation (Medical):  "No    Lack of Transportation (Non-Medical): No   Physical Activity: Inactive    Days of Exercise per Week: 0 days    Minutes of Exercise per Session: 0 min   Stress: No Stress Concern Present    Feeling of Stress : Only a little   Social Connections: Socially Integrated    Frequency of Communication with Friends and Family: More than three times a week    Frequency of Social Gatherings with Friends and Family: Twice a week    Attends Taoism Services: More than 4 times per year    Active Member of Clubs or Organizations: Yes    Attends Club or Organization Meetings: More than 4 times per year    Marital Status:    Housing Stability: Low Risk     Unable to Pay for Housing in the Last Year: No    Number of Places Lived in the Last Year: 1    Unstable Housing in the Last Year: No       MEDICATIONS & ALLERGIES:     Current Outpatient Medications on File Prior to Visit   Medication Sig Dispense Refill    albuterol (PROAIR HFA) 90 mcg/actuation inhaler Inhale 2 puffs into the lungs every 6 (six) hours as needed for Wheezing. Rescue 18 g 6    allopurinoL (ZYLOPRIM) 300 MG tablet Take 300 mg by mouth once daily.      amLODIPine (NORVASC) 10 MG tablet TAKE 1 TABLET BY MOUTH ONCE DAILY 90 tablet 0    aspirin 81 MG Chew Take 1 tablet (81 mg total) by mouth once daily.  0    atorvastatin (LIPITOR) 20 MG tablet Take 1 tablet (20 mg total) by mouth once daily. 90 tablet 3    BD ULTRA-FINE KIKA PEN NEEDLE 32 gauge x 5/32" Ndle To use 4 times daily 200 each 20    blood sugar diagnostic Strp 1 strip 4 times daily. Contour Next. 200 strip 11    blood-glucose meter kit 1 each by Other route once daily. Use daily. Insurance proffered one touch  E11.42 1 each 0    blood-glucose sensor (DEXCOM G6 SENSOR) Terese Use 1 device, exchange every 10 days 3 each 11    blood-glucose transmitter (DEXCOM G6 TRANSMITTER) Terese Use 1 transmitter, every 3 months as directed 1 each 3    chlorthalidone (HYGROTEN) 25 MG Tab Take 1 tablet by mouth " once daily 60 tablet 0    CONSTULOSE 10 gram/15 mL solution SMARTSI Milliliter(s) By Mouth Twice Daily PRN      dulaglutide (TRULICITY) 4.5 mg/0.5 mL pen injector Inject 4.5 mg into the skin every 7 days. 12 pen 3    DULoxetine (CYMBALTA) 30 MG capsule TAKE 1 CAPSULE BY MOUTH  ONCE DAILY 90 capsule 3    empagliflozin (JARDIANCE) 10 mg tablet Take 1 tablet (10 mg total) by mouth once daily. 90 tablet 3    famotidine (PEPCID) 20 MG tablet Take 1 tablet (20 mg total) by mouth 2 (two) times daily. 1 tablet 0    finasteride (PROSCAR) 5 mg tablet Take 1 tablet (5 mg total) by mouth once daily. 30 tablet 11    glucagon (GVOKE HYPOPEN 1-PACK) 0.5 mg/0.1 mL AtIn Inject 1 Dose into the skin daily as needed (for severe hypoglycemia if you aren't able to treat by ingesting sugar). 1 Syringe 3    HUMALOG KWIKPEN INSULIN 100 unit/mL pen INJECT SUBCUTANEOUSLY 11  UNITS INTO THE SKIN 3 TIMES DAILY BEFORE MEALS PLUS  SLIDING SCALE - MAX TOTAL  DAILY DOSE 63 UNITS 30 mL 6    HYDROcodone-acetaminophen (NORCO) 5-325 mg per tablet Take 1 tablet by mouth every 8 (eight) hours as needed for Pain. 13 tablet 0    insulin degludec (TRESIBA FLEXTOUCH U-100) 100 unit/mL (3 mL) insulin pen Inject 40 Units into the skin once daily. 15 pen 3    irbesartan (AVAPRO) 300 MG tablet TAKE 1 TABLET BY MOUTH IN  THE EVENING 90 tablet 3    ketoconazole (NIZORAL) 2 % shampoo Wash hair with medicated shampoo at least 2x/week - let sit on scalp at least 5 minutes prior to rinsing 120 mL 5    labetaloL (NORMODYNE) 300 MG tablet Take 1 tablet (300 mg total) by mouth 2 (two) times daily. 180 tablet 3    lactulose (CHRONULAC) 20 gram/30 mL Soln Take 30 mLs (20 g total) by mouth 2 (two) times daily as needed (constipation). 1800 mL 2    lancets 31 gauge Misc 1 lancet by Misc.(Non-Drug; Combo Route) route 4 (four) times daily. E11.42 . Prescribed one touch 200 each 6    multivitamin (THERAGRAN) per tablet Take 1 tablet by mouth once daily.      mupirocin  (BACTROBAN) 2 % ointment Apply topically 2 (two) times daily.      pregabalin (LYRICA) 150 MG capsule Take 1 capsule (150 mg total) by mouth 2 (two) time daily. 60 capsule 11    sodium bicarbonate 325 MG tablet Take 2 tablets (650 mg total) by mouth 2 (two) times daily. 120 tablet 11    [DISCONTINUED] insulin glargine, TOUJEO, (TOUJEO SOLOSTAR) 300 unit/mL (1.5 mL) InPn pen INJECT 40 UNITS INTO THE SKIN EVERY EVENING TITRATE UP AS DIRECTED. 4.5 mL 0     Current Facility-Administered Medications on File Prior to Visit   Medication Dose Route Frequency Provider Last Rate Last Admin    fentaNYL injection 25 mcg  25 mcg Intravenous Q5 Min PRN Desmond Fontana MD   50 mcg at 01/08/21 0955    midazolam (VERSED) 1 mg/mL injection 0.5 mg  0.5 mg Intravenous PRN Desmond Fontana MD   2 mg at 01/08/21 0955    triamcinolone acetonide injection 10 mg  10 mg Intradermal Once Susie Curran MD            Review of patient's allergies indicates:   Allergen Reactions    Clindamycin Hives and Swelling    Iodinated contrast media Rash       Medications Reconciliation:   I have reconciled the patient's home medications with the patient/family. I have updated all changes.  Refer to After-Visit Medication List.    OBJECTIVE:     Vital Signs:  There were no vitals filed for this visit.  Wt Readings from Last 3 Encounters:   03/14/23 1214 133.8 kg (294 lb 15.6 oz)   02/28/23 1005 133.8 kg (294 lb 15.6 oz)   02/23/23 0949 133 kg (293 lb 3.4 oz)     There is no height or weight on file to calculate BMI.     Wt Readings from Last 3 Encounters:   04/20/23 132.5 kg (292 lb 1.8 oz)   03/14/23 133.8 kg (294 lb 15.6 oz)   02/28/23 133.8 kg (294 lb 15.6 oz)     Temp Readings from Last 3 Encounters:   02/28/23 97.1 °F (36.2 °C) (Oral)   01/12/23 98.9 °F (37.2 °C) (Tympanic)   12/20/22 97.9 °F (36.6 °C) (Oral)     BP Readings from Last 3 Encounters:   04/20/23 (!) 132/58   03/14/23 (!) 123/58   02/28/23 (!) 142/67     Pulse  Readings from Last 3 Encounters:   04/20/23 72   03/14/23 67   02/28/23 64         Physical Exam:  General: Well developed, well nourished. No distress.  HEENT: Head is normocephalic, atraumatic  Eyes: Clear conjunctiva.  Neck: Supple, symmetrical neck; trachea midline.  Lungs: Clear to auscultation bilaterally and normal respiratory effort.  Cardiovascular: Heart with regular rate and rhythm. No murmurs, gallops or rubs  Extremities: No LE edema. Pulses 2+ and symmetric.   Abdomen: Abdomen is soft, non-tender non-distended with normal bowel sounds.  Skin: Skin color, texture, turgor normal. No rashes.  Neurologic: Normal strength and tone. No focal numbness or weakness.       Laboratory  Lab Results   Component Value Date    WBC 6.98 01/05/2023    HGB 9.8 (L) 01/05/2023    HCT 30.8 (L) 01/05/2023     01/05/2023    CHOL 145 01/05/2023    TRIG 154 (H) 01/05/2023    HDL 36 (L) 01/05/2023    ALT 17 01/05/2023    AST 18 01/05/2023     01/05/2023    K 4.5 01/05/2023     01/05/2023    CREATININE 1.7 (H) 01/05/2023    BUN 28 (H) 01/05/2023    CO2 25 01/05/2023    TSH 1.365 09/08/2020    INR 1.0 03/10/2006    HGBA1C 7.7 (H) 01/05/2023         ASSESSMENT & PLAN:     Same day appt.   Follow up on behalf of PCP.   6 month follow up.  Last seen by PCP: 9/2022    Follow-up exam, 3-6 months since previous exam    Essential hypertension    Type 2 diabetes mellitus with stage 3b chronic kidney disease and hypertension    Dyslipidemia associated with type 2 diabetes mellitus    Anemia in stage 3 chronic kidney disease, unspecified whether stage 3a or 3b CKD    Polyneuropathy    Chronic pain syndrome    Lumbar facet arthropathy    Spinal stenosis of lumbar region, unspecified whether neurogenic claudication present    Diabetic autonomic neuropathy associated with type 2 diabetes mellitus    Chronic low back pain with sciatica, sciatica laterality unspecified, unspecified back pain laterality    Primary  osteoarthritis of right knee    Polyneuropathy associated with underlying disease    Encounter for medication refill            HTN:   Today: 132/58  ` Norvasc   ` Labetalol  ` Avapro  ` Hygroten    DM II:   *Seen and managed by Endocrine as of 2/6/2023, with rec'd follow up in 8/2023.  ` Tresiba  ` Trulicity   ` Humalog  ` Jardiance    Polyneuropathy 2/2 DM  *Seen by Podiatry on 3/14/2023    Chronic Pain:   ` Cymbalta (refilled in 12/2022 per PCP, with refills till 12/2023; too soon to refill)  ` Lyrica  (Per Dr. Love)    Chronic Constipation:   *Followed by Dr. FRITZ Lopez in GI, seen in 1/2023, rec'd EGD and C Scope.. 4/26/2023 (apt)  ` Contulose  ` Lactulose... ?    Chronic Anemia:   *Followed by Dr. WILLIAM Kwan in Hematology with rec'd follow up in 6 months from 12/2022 (~ 6/2023)    History of CVA / HLP / Chronic LE Edema in the setting of DD:   *Followed and managed by Dr. Hodges and Dr. Nance   ` Lipitor  (switched by her Cards team in 11/2022)  ` ASA  ` compression stockings.     RUFINA:   CPAP  (no longer has it due to not wearing consistently)    Screening:   C Scope and EGD: 4/26/2023    *Follow up with PCP in 3-4 months. Sooner if indicated.     Future Appointments   Date Time Provider Department Center   4/20/2023 11:00 AM Kasi Aleman MD [facility] RHEUM [place]   5/25/2023  9:20 AM Amrita Maria OD [facility] OPTOMTY [place]   6/19/2023 10:30 AM Pippa Dubose DPM [facility] POD [place]          Medication List            Accurate as of April 20, 2023  9:32 AM. If you have any questions, ask your nurse or doctor.                CONTINUE taking these medications      albuterol 90 mcg/actuation inhaler  Commonly known as: PROAIR HFA  Inhale 2 puffs into the lungs every 6 (six) hours as needed for Wheezing. Rescue     allopurinoL 300 MG tablet  Commonly known as: ZYLOPRIM     amLODIPine 10 MG tablet  Commonly known as: NORVASC  TAKE 1 TABLET BY MOUTH ONCE DAILY     aspirin 81 MG Chew  Take 1  "tablet (81 mg total) by mouth once daily.     atorvastatin 20 MG tablet  Commonly known as: LIPITOR  Take 1 tablet (20 mg total) by mouth once daily.     BD ULTRA-FINE KIKA PEN NEEDLE 32 gauge x 5/32" Ndle  Generic drug: pen needle, diabetic  To use 4 times daily     blood sugar diagnostic Strp  1 strip 4 times daily. Contour Next.     blood-glucose meter kit  1 each by Other route once daily. Use daily. Insurance proffered one touch  E11.42     chlorthalidone 25 MG Tab  Commonly known as: HYGROTEN  Take 1 tablet by mouth once daily     DULoxetine 30 MG capsule  Commonly known as: CYMBALTA  TAKE 1 CAPSULE BY MOUTH  ONCE DAILY     empagliflozin 10 mg tablet  Commonly known as: JARDIANCE  Take 1 tablet (10 mg total) by mouth once daily.     famotidine 20 MG tablet  Commonly known as: PEPCID  Take 1 tablet (20 mg total) by mouth 2 (two) times daily.     finasteride 5 mg tablet  Commonly known as: PROSCAR  Take 1 tablet (5 mg total) by mouth once daily.     fluocinolone and shower cap 0.01 % Oil  Commonly known as: DERMA-SMOOTHE/FS SCALP OIL  Apply oil to damp scalp nightly and cover with shower cap.     GVOKE HYPOPEN 1-PACK 0.5 mg/0.1 mL Atin  Generic drug: glucagon  Inject 1 Dose into the skin daily as needed (for severe hypoglycemia if you aren't able to treat by ingesting sugar).     HumaLOG KwikPen Insulin 100 unit/mL pen  Generic drug: insulin lispro  INJECT SUBCUTANEOUSLY 11  UNITS INTO THE SKIN 3 TIMES DAILY BEFORE MEALS PLUS  SLIDING SCALE - MAX TOTAL  DAILY DOSE 63 UNITS     insulin degludec 100 unit/mL (3 mL) insulin pen  Commonly known as: TRESIBA FLEXTOUCH U-100  Inject 40 Units into the skin once daily.     irbesartan 300 MG tablet  Commonly known as: AVAPRO  TAKE 1 TABLET BY MOUTH IN  THE EVENING     ketoconazole 2 % shampoo  Commonly known as: NIZORAL  Wash hair with medicated shampoo at least 2x/week - let sit on scalp at least 5 minutes prior to rinsing     labetaloL 300 MG tablet  Commonly known as: " NORMODYNE  Take 1 tablet (300 mg total) by mouth 2 (two) times daily.     * lactulose 20 gram/30 mL Soln  Commonly known as: CHRONULAC  Take 30 mLs (20 g total) by mouth 2 (two) times daily as needed (constipation).     * CONSTULOSE 10 gram/15 mL solution  Generic drug: lactulose     lancets 31 gauge Misc  1 lancet by Misc.(Non-Drug; Combo Route) route 4 (four) times daily. E11.42 . Prescribed one touch     multivitamin per tablet  Commonly known as: THERAGRAN     pregabalin 150 MG capsule  Commonly known as: LYRICA  Take 1 capsule (150 mg total) by mouth 2 (two) time daily.     sodium bicarbonate 325 MG tablet  Take 2 tablets (650 mg total) by mouth 2 (two) times daily.     TRULICITY 4.5 mg/0.5 mL pen injector  Generic drug: dulaglutide  Inject 4.5 mg into the skin every 7 days.           * This list has 2 medication(s) that are the same as other medications prescribed for you. Read the directions carefully, and ask your doctor or other care provider to review them with you.                  Signing Physician:  LOUISA Durham

## 2023-04-18 ENCOUNTER — OFFICE VISIT (OUTPATIENT)
Dept: DERMATOLOGY | Facility: CLINIC | Age: 73
End: 2023-04-18
Payer: MEDICARE

## 2023-04-18 DIAGNOSIS — L66.9 SCARRING ALOPECIA: Primary | ICD-10-CM

## 2023-04-18 DIAGNOSIS — L64.9 ANDROGENIC ALOPECIA: ICD-10-CM

## 2023-04-18 DIAGNOSIS — L65.8 TRACTION ALOPECIA: ICD-10-CM

## 2023-04-18 PROCEDURE — 3051F HG A1C>EQUAL 7.0%<8.0%: CPT | Mod: CPTII,S$GLB,, | Performed by: DERMATOLOGY

## 2023-04-18 PROCEDURE — 4010F ACE/ARB THERAPY RXD/TAKEN: CPT | Mod: CPTII,S$GLB,, | Performed by: DERMATOLOGY

## 2023-04-18 PROCEDURE — 99214 PR OFFICE/OUTPT VISIT, EST, LEVL IV, 30-39 MIN: ICD-10-PCS | Mod: 25,S$GLB,, | Performed by: DERMATOLOGY

## 2023-04-18 PROCEDURE — 1159F PR MEDICATION LIST DOCUMENTED IN MEDICAL RECORD: ICD-10-PCS | Mod: CPTII,S$GLB,, | Performed by: DERMATOLOGY

## 2023-04-18 PROCEDURE — 99999 PR PBB SHADOW E&M-EST. PATIENT-LVL II: ICD-10-PCS | Mod: PBBFAC,,, | Performed by: DERMATOLOGY

## 2023-04-18 PROCEDURE — 3288F FALL RISK ASSESSMENT DOCD: CPT | Mod: CPTII,S$GLB,, | Performed by: DERMATOLOGY

## 2023-04-18 PROCEDURE — 3051F PR MOST RECENT HEMOGLOBIN A1C LEVEL 7.0 - < 8.0%: ICD-10-PCS | Mod: CPTII,S$GLB,, | Performed by: DERMATOLOGY

## 2023-04-18 PROCEDURE — 99999 PR PBB SHADOW E&M-EST. PATIENT-LVL II: CPT | Mod: PBBFAC,,, | Performed by: DERMATOLOGY

## 2023-04-18 PROCEDURE — 3066F PR DOCUMENTATION OF TREATMENT FOR NEPHROPATHY: ICD-10-PCS | Mod: CPTII,S$GLB,, | Performed by: DERMATOLOGY

## 2023-04-18 PROCEDURE — 1126F PR PAIN SEVERITY QUANTIFIED, NO PAIN PRESENT: ICD-10-PCS | Mod: CPTII,S$GLB,, | Performed by: DERMATOLOGY

## 2023-04-18 PROCEDURE — 11900 INJECT SKIN LESIONS </W 7: CPT | Mod: S$GLB,,, | Performed by: DERMATOLOGY

## 2023-04-18 PROCEDURE — 3060F POS MICROALBUMINURIA REV: CPT | Mod: CPTII,S$GLB,, | Performed by: DERMATOLOGY

## 2023-04-18 PROCEDURE — 3060F PR POS MICROALBUMINURIA RESULT DOCUMENTED/REVIEW: ICD-10-PCS | Mod: CPTII,S$GLB,, | Performed by: DERMATOLOGY

## 2023-04-18 PROCEDURE — 1159F MED LIST DOCD IN RCRD: CPT | Mod: CPTII,S$GLB,, | Performed by: DERMATOLOGY

## 2023-04-18 PROCEDURE — 3066F NEPHROPATHY DOC TX: CPT | Mod: CPTII,S$GLB,, | Performed by: DERMATOLOGY

## 2023-04-18 PROCEDURE — 11900 PR INJECTION INTO SKIN LESIONS, UP TO 7: ICD-10-PCS | Mod: S$GLB,,, | Performed by: DERMATOLOGY

## 2023-04-18 PROCEDURE — 4010F PR ACE/ARB THEARPY RXD/TAKEN: ICD-10-PCS | Mod: CPTII,S$GLB,, | Performed by: DERMATOLOGY

## 2023-04-18 PROCEDURE — 1101F PR PT FALLS ASSESS DOC 0-1 FALLS W/OUT INJ PAST YR: ICD-10-PCS | Mod: CPTII,S$GLB,, | Performed by: DERMATOLOGY

## 2023-04-18 PROCEDURE — 1101F PT FALLS ASSESS-DOCD LE1/YR: CPT | Mod: CPTII,S$GLB,, | Performed by: DERMATOLOGY

## 2023-04-18 PROCEDURE — 3288F PR FALLS RISK ASSESSMENT DOCUMENTED: ICD-10-PCS | Mod: CPTII,S$GLB,, | Performed by: DERMATOLOGY

## 2023-04-18 PROCEDURE — 99214 OFFICE O/P EST MOD 30 MIN: CPT | Mod: 25,S$GLB,, | Performed by: DERMATOLOGY

## 2023-04-18 PROCEDURE — 1126F AMNT PAIN NOTED NONE PRSNT: CPT | Mod: CPTII,S$GLB,, | Performed by: DERMATOLOGY

## 2023-04-18 RX ORDER — FLUOCINOLONE ACETONIDE 0.11 MG/ML
OIL TOPICAL
Qty: 118.28 ML | Refills: 5 | Status: SHIPPED | OUTPATIENT
Start: 2023-04-18 | End: 2023-06-15

## 2023-04-18 NOTE — PROGRESS NOTES
Subjective:      Patient ID:  Annika Mac is a 72 y.o. female who presents for   Chief Complaint   Patient presents with    Hair Loss     Scalp      Hair Loss - Follow-up  Diagnosis: androgenic alopecia.  Symptom course: improving  Currently using: Women's rogaine (stopped), finasteride , and keto shampoo.  Affected locations: scalp  Signs / symptoms: itching  Severity: mild    Review of Systems   Constitutional:  Positive for weight loss. Negative for fever, chills and weight gain.   Skin:  Negative for daily sunscreen use, activity-related sunscreen use, recent sunburn and wears hat.   Hematologic/Lymphatic: Bruises/bleeds easily.     Objective:   Physical Exam   Constitutional: She appears well-developed and well-nourished. No distress.   Neurological: She is alert and oriented to person, place, and time. She is not disoriented.   Psychiatric: She has a normal mood and affect.   Skin:   Areas Examined (abnormalities noted in diagram):   Scalp / Hair Palpated and Inspected  LLE Inspection Performed               Diagram Legend     Erythematous scaling macule/papule c/w actinic keratosis       Vascular papule c/w angioma      Pigmented verrucoid papule/plaque c/w seborrheic keratosis      Yellow umbilicated papule c/w sebaceous hyperplasia      Irregularly shaped tan macule c/w lentigo     1-2 mm smooth white papules consistent with Milia      Movable subcutaneous cyst with punctum c/w epidermal inclusion cyst      Subcutaneous movable cyst c/w pilar cyst      Firm pink to brown papule c/w dermatofibroma      Pedunculated fleshy papule(s) c/w skin tag(s)      Evenly pigmented macule c/w junctional nevus     Mildly variegated pigmented, slightly irregular-bordered macule c/w mildly atypical nevus      Flesh colored to evenly pigmented papule c/w intradermal nevus       Pink pearly papule/plaque c/w basal cell carcinoma      Erythematous hyperkeratotic cursted plaque c/w SCC      Surgical scar with no sign of skin  cancer recurrence      Open and closed comedones      Inflammatory papules and pustules      Verrucoid papule consistent consistent with wart     Erythematous eczematous patches and plaques     Dystrophic onycholytic nail with subungual debris c/w onychomycosis     Umbilicated papule    Erythematous-base heme-crusted tan verrucoid plaque consistent with inflamed seborrheic keratosis     Erythematous Silvery Scaling Plaque c/w Psoriasis     See annotation                Assessment / Plan:        Scarring alopecia  -     DC [id number] ACETONIDE INJ PER 10MG  -     triamcinolone acetonide injection 10 mg  -     DC INJECTION INTO SKIN LESIONS, UP TO 7  Intralesional Kenalog 5mg/cc (2 cc total) injected into 6 lesions on the frontal temporal today after obtaining verbal consent including risk of surrounding hypopigmentation. Patient tolerated procedure well.  Units: 1.0  NDC for Kenalog 10mg/cc:  6460-8478-94    Traction alopecia  - start fluocinolone oil to scalp every other day  Counseling on topical steroids:  Patient counseled that the prolonged use of topical steroids can result in the increased appearance superficial blood vessels (telangiectasias) lightening (hypopigmentation), and   thinning of the skin ( atrophy).  Patient understands to avoid using high potency steroids in skin folds, the groin or the face.  The patient verbalized understanding of proper use and possible adverse effects of topical steroids.  All patient's questions and concerns were addressed.        Androgenic alopecia  Continue keto shampoo and finasteride. Stop rogaine as patient claims it is causing hair loss.             Follow up in about 3 months (around 7/18/2023) for med mariluz/injections.

## 2023-04-20 ENCOUNTER — OFFICE VISIT (OUTPATIENT)
Dept: RHEUMATOLOGY | Facility: CLINIC | Age: 73
End: 2023-04-20
Payer: COMMERCIAL

## 2023-04-20 ENCOUNTER — LAB VISIT (OUTPATIENT)
Dept: LAB | Facility: HOSPITAL | Age: 73
End: 2023-04-20
Attending: INTERNAL MEDICINE
Payer: MEDICARE

## 2023-04-20 ENCOUNTER — OFFICE VISIT (OUTPATIENT)
Dept: INTERNAL MEDICINE | Facility: CLINIC | Age: 73
End: 2023-04-20
Payer: MEDICARE

## 2023-04-20 ENCOUNTER — TELEPHONE (OUTPATIENT)
Dept: ENDOCRINOLOGY | Facility: CLINIC | Age: 73
End: 2023-04-20
Payer: MEDICARE

## 2023-04-20 VITALS
BODY MASS INDEX: 48.67 KG/M2 | HEART RATE: 72 BPM | DIASTOLIC BLOOD PRESSURE: 58 MMHG | HEIGHT: 65 IN | WEIGHT: 292.13 LBS | OXYGEN SATURATION: 99 % | SYSTOLIC BLOOD PRESSURE: 132 MMHG

## 2023-04-20 VITALS
DIASTOLIC BLOOD PRESSURE: 66 MMHG | WEIGHT: 293 LBS | HEIGHT: 65 IN | SYSTOLIC BLOOD PRESSURE: 119 MMHG | BODY MASS INDEX: 48.82 KG/M2 | HEART RATE: 93 BPM

## 2023-04-20 DIAGNOSIS — E78.5 DYSLIPIDEMIA ASSOCIATED WITH TYPE 2 DIABETES MELLITUS: ICD-10-CM

## 2023-04-20 DIAGNOSIS — I10 ESSENTIAL HYPERTENSION: ICD-10-CM

## 2023-04-20 DIAGNOSIS — M47.816 LUMBAR FACET ARTHROPATHY: ICD-10-CM

## 2023-04-20 DIAGNOSIS — G47.33 OSA (OBSTRUCTIVE SLEEP APNEA): ICD-10-CM

## 2023-04-20 DIAGNOSIS — M48.061 SPINAL STENOSIS OF LUMBAR REGION, UNSPECIFIED WHETHER NEUROGENIC CLAUDICATION PRESENT: ICD-10-CM

## 2023-04-20 DIAGNOSIS — G89.29 CHRONIC LOW BACK PAIN WITH SCIATICA, SCIATICA LATERALITY UNSPECIFIED, UNSPECIFIED BACK PAIN LATERALITY: ICD-10-CM

## 2023-04-20 DIAGNOSIS — N18.32 TYPE 2 DIABETES MELLITUS WITH STAGE 3B CHRONIC KIDNEY DISEASE AND HYPERTENSION: ICD-10-CM

## 2023-04-20 DIAGNOSIS — E11.69 DYSLIPIDEMIA ASSOCIATED WITH TYPE 2 DIABETES MELLITUS: ICD-10-CM

## 2023-04-20 DIAGNOSIS — Z09 FOLLOW-UP EXAM, 3-6 MONTHS SINCE PREVIOUS EXAM: Primary | ICD-10-CM

## 2023-04-20 DIAGNOSIS — M47.816 OSTEOARTHRITIS OF LUMBAR SPINE, UNSPECIFIED SPINAL OSTEOARTHRITIS COMPLICATION STATUS: ICD-10-CM

## 2023-04-20 DIAGNOSIS — Z76.0 ENCOUNTER FOR MEDICATION REFILL: ICD-10-CM

## 2023-04-20 DIAGNOSIS — M17.11 PRIMARY OSTEOARTHRITIS OF RIGHT KNEE: Primary | ICD-10-CM

## 2023-04-20 DIAGNOSIS — G62.9 POLYNEUROPATHY: ICD-10-CM

## 2023-04-20 DIAGNOSIS — N18.30 ANEMIA IN STAGE 3 CHRONIC KIDNEY DISEASE, UNSPECIFIED WHETHER STAGE 3A OR 3B CKD: ICD-10-CM

## 2023-04-20 DIAGNOSIS — M17.11 PRIMARY OSTEOARTHRITIS OF RIGHT KNEE: ICD-10-CM

## 2023-04-20 DIAGNOSIS — M54.40 CHRONIC LOW BACK PAIN WITH SCIATICA, SCIATICA LATERALITY UNSPECIFIED, UNSPECIFIED BACK PAIN LATERALITY: ICD-10-CM

## 2023-04-20 DIAGNOSIS — I12.9 TYPE 2 DIABETES MELLITUS WITH STAGE 3B CHRONIC KIDNEY DISEASE AND HYPERTENSION: ICD-10-CM

## 2023-04-20 DIAGNOSIS — D63.1 ANEMIA IN STAGE 3 CHRONIC KIDNEY DISEASE, UNSPECIFIED WHETHER STAGE 3A OR 3B CKD: ICD-10-CM

## 2023-04-20 DIAGNOSIS — M1A.09X0 IDIOPATHIC CHRONIC GOUT OF MULTIPLE SITES WITHOUT TOPHUS: ICD-10-CM

## 2023-04-20 DIAGNOSIS — G63 POLYNEUROPATHY ASSOCIATED WITH UNDERLYING DISEASE: ICD-10-CM

## 2023-04-20 DIAGNOSIS — E11.22 TYPE 2 DIABETES MELLITUS WITH STAGE 3B CHRONIC KIDNEY DISEASE AND HYPERTENSION: ICD-10-CM

## 2023-04-20 DIAGNOSIS — E11.43 DIABETIC AUTONOMIC NEUROPATHY ASSOCIATED WITH TYPE 2 DIABETES MELLITUS: ICD-10-CM

## 2023-04-20 DIAGNOSIS — G89.4 CHRONIC PAIN SYNDROME: ICD-10-CM

## 2023-04-20 LAB — URATE SERPL-MCNC: 5.4 MG/DL (ref 2.4–5.7)

## 2023-04-20 PROCEDURE — 1126F PR PAIN SEVERITY QUANTIFIED, NO PAIN PRESENT: ICD-10-PCS | Mod: CPTII,S$GLB,, | Performed by: NURSE PRACTITIONER

## 2023-04-20 PROCEDURE — 99214 OFFICE O/P EST MOD 30 MIN: CPT | Mod: S$GLB,,, | Performed by: INTERNAL MEDICINE

## 2023-04-20 PROCEDURE — 3066F NEPHROPATHY DOC TX: CPT | Mod: CPTII,S$GLB,, | Performed by: INTERNAL MEDICINE

## 2023-04-20 PROCEDURE — 3060F POS MICROALBUMINURIA REV: CPT | Mod: CPTII,S$GLB,, | Performed by: NURSE PRACTITIONER

## 2023-04-20 PROCEDURE — 3078F PR MOST RECENT DIASTOLIC BLOOD PRESSURE < 80 MM HG: ICD-10-PCS | Mod: CPTII,S$GLB,, | Performed by: INTERNAL MEDICINE

## 2023-04-20 PROCEDURE — 84550 ASSAY OF BLOOD/URIC ACID: CPT | Performed by: INTERNAL MEDICINE

## 2023-04-20 PROCEDURE — 1159F PR MEDICATION LIST DOCUMENTED IN MEDICAL RECORD: ICD-10-PCS | Mod: CPTII,S$GLB,, | Performed by: INTERNAL MEDICINE

## 2023-04-20 PROCEDURE — 99999 PR PBB SHADOW E&M-EST. PATIENT-LVL V: CPT | Mod: PBBFAC,,, | Performed by: NURSE PRACTITIONER

## 2023-04-20 PROCEDURE — 4010F ACE/ARB THERAPY RXD/TAKEN: CPT | Mod: CPTII,S$GLB,, | Performed by: NURSE PRACTITIONER

## 2023-04-20 PROCEDURE — 3060F PR POS MICROALBUMINURIA RESULT DOCUMENTED/REVIEW: ICD-10-PCS | Mod: CPTII,S$GLB,, | Performed by: INTERNAL MEDICINE

## 2023-04-20 PROCEDURE — 3288F FALL RISK ASSESSMENT DOCD: CPT | Mod: CPTII,S$GLB,, | Performed by: INTERNAL MEDICINE

## 2023-04-20 PROCEDURE — 1126F AMNT PAIN NOTED NONE PRSNT: CPT | Mod: CPTII,S$GLB,, | Performed by: NURSE PRACTITIONER

## 2023-04-20 PROCEDURE — 3008F BODY MASS INDEX DOCD: CPT | Mod: CPTII,S$GLB,, | Performed by: INTERNAL MEDICINE

## 2023-04-20 PROCEDURE — 1125F PR PAIN SEVERITY QUANTIFIED, PAIN PRESENT: ICD-10-PCS | Mod: CPTII,S$GLB,, | Performed by: INTERNAL MEDICINE

## 2023-04-20 PROCEDURE — 1101F PT FALLS ASSESS-DOCD LE1/YR: CPT | Mod: CPTII,S$GLB,, | Performed by: INTERNAL MEDICINE

## 2023-04-20 PROCEDURE — 3288F PR FALLS RISK ASSESSMENT DOCUMENTED: ICD-10-PCS | Mod: CPTII,S$GLB,, | Performed by: INTERNAL MEDICINE

## 2023-04-20 PROCEDURE — 1101F PT FALLS ASSESS-DOCD LE1/YR: CPT | Mod: CPTII,S$GLB,, | Performed by: NURSE PRACTITIONER

## 2023-04-20 PROCEDURE — 3075F PR MOST RECENT SYSTOLIC BLOOD PRESS GE 130-139MM HG: ICD-10-PCS | Mod: CPTII,S$GLB,, | Performed by: NURSE PRACTITIONER

## 2023-04-20 PROCEDURE — 3288F PR FALLS RISK ASSESSMENT DOCUMENTED: ICD-10-PCS | Mod: CPTII,S$GLB,, | Performed by: NURSE PRACTITIONER

## 2023-04-20 PROCEDURE — 99999 PR PBB SHADOW E&M-EST. PATIENT-LVL V: ICD-10-PCS | Mod: PBBFAC,,, | Performed by: NURSE PRACTITIONER

## 2023-04-20 PROCEDURE — 3066F PR DOCUMENTATION OF TREATMENT FOR NEPHROPATHY: ICD-10-PCS | Mod: CPTII,S$GLB,, | Performed by: NURSE PRACTITIONER

## 2023-04-20 PROCEDURE — 1101F PR PT FALLS ASSESS DOC 0-1 FALLS W/OUT INJ PAST YR: ICD-10-PCS | Mod: CPTII,S$GLB,, | Performed by: NURSE PRACTITIONER

## 2023-04-20 PROCEDURE — 3075F SYST BP GE 130 - 139MM HG: CPT | Mod: CPTII,S$GLB,, | Performed by: NURSE PRACTITIONER

## 2023-04-20 PROCEDURE — 1159F PR MEDICATION LIST DOCUMENTED IN MEDICAL RECORD: ICD-10-PCS | Mod: CPTII,S$GLB,, | Performed by: NURSE PRACTITIONER

## 2023-04-20 PROCEDURE — 3051F PR MOST RECENT HEMOGLOBIN A1C LEVEL 7.0 - < 8.0%: ICD-10-PCS | Mod: CPTII,S$GLB,, | Performed by: INTERNAL MEDICINE

## 2023-04-20 PROCEDURE — 3288F FALL RISK ASSESSMENT DOCD: CPT | Mod: CPTII,S$GLB,, | Performed by: NURSE PRACTITIONER

## 2023-04-20 PROCEDURE — 1159F MED LIST DOCD IN RCRD: CPT | Mod: CPTII,S$GLB,, | Performed by: NURSE PRACTITIONER

## 2023-04-20 PROCEDURE — 4010F PR ACE/ARB THEARPY RXD/TAKEN: ICD-10-PCS | Mod: CPTII,S$GLB,, | Performed by: NURSE PRACTITIONER

## 2023-04-20 PROCEDURE — 1125F AMNT PAIN NOTED PAIN PRSNT: CPT | Mod: CPTII,S$GLB,, | Performed by: INTERNAL MEDICINE

## 2023-04-20 PROCEDURE — 3078F PR MOST RECENT DIASTOLIC BLOOD PRESSURE < 80 MM HG: ICD-10-PCS | Mod: CPTII,S$GLB,, | Performed by: NURSE PRACTITIONER

## 2023-04-20 PROCEDURE — 1159F MED LIST DOCD IN RCRD: CPT | Mod: CPTII,S$GLB,, | Performed by: INTERNAL MEDICINE

## 2023-04-20 PROCEDURE — 99214 PR OFFICE/OUTPT VISIT, EST, LEVL IV, 30-39 MIN: ICD-10-PCS | Mod: S$GLB,,, | Performed by: INTERNAL MEDICINE

## 2023-04-20 PROCEDURE — 3051F HG A1C>EQUAL 7.0%<8.0%: CPT | Mod: CPTII,S$GLB,, | Performed by: NURSE PRACTITIONER

## 2023-04-20 PROCEDURE — 3066F PR DOCUMENTATION OF TREATMENT FOR NEPHROPATHY: ICD-10-PCS | Mod: CPTII,S$GLB,, | Performed by: INTERNAL MEDICINE

## 2023-04-20 PROCEDURE — 99214 OFFICE O/P EST MOD 30 MIN: CPT | Mod: S$GLB,,, | Performed by: NURSE PRACTITIONER

## 2023-04-20 PROCEDURE — 3060F PR POS MICROALBUMINURIA RESULT DOCUMENTED/REVIEW: ICD-10-PCS | Mod: CPTII,S$GLB,, | Performed by: NURSE PRACTITIONER

## 2023-04-20 PROCEDURE — 3051F PR MOST RECENT HEMOGLOBIN A1C LEVEL 7.0 - < 8.0%: ICD-10-PCS | Mod: CPTII,S$GLB,, | Performed by: NURSE PRACTITIONER

## 2023-04-20 PROCEDURE — 3060F POS MICROALBUMINURIA REV: CPT | Mod: CPTII,S$GLB,, | Performed by: INTERNAL MEDICINE

## 2023-04-20 PROCEDURE — 4010F ACE/ARB THERAPY RXD/TAKEN: CPT | Mod: CPTII,S$GLB,, | Performed by: INTERNAL MEDICINE

## 2023-04-20 PROCEDURE — 3008F PR BODY MASS INDEX (BMI) DOCUMENTED: ICD-10-PCS | Mod: CPTII,S$GLB,, | Performed by: NURSE PRACTITIONER

## 2023-04-20 PROCEDURE — 3051F HG A1C>EQUAL 7.0%<8.0%: CPT | Mod: CPTII,S$GLB,, | Performed by: INTERNAL MEDICINE

## 2023-04-20 PROCEDURE — 4010F PR ACE/ARB THEARPY RXD/TAKEN: ICD-10-PCS | Mod: CPTII,S$GLB,, | Performed by: INTERNAL MEDICINE

## 2023-04-20 PROCEDURE — 3074F PR MOST RECENT SYSTOLIC BLOOD PRESSURE < 130 MM HG: ICD-10-PCS | Mod: CPTII,S$GLB,, | Performed by: INTERNAL MEDICINE

## 2023-04-20 PROCEDURE — 1101F PR PT FALLS ASSESS DOC 0-1 FALLS W/OUT INJ PAST YR: ICD-10-PCS | Mod: CPTII,S$GLB,, | Performed by: INTERNAL MEDICINE

## 2023-04-20 PROCEDURE — 36415 COLL VENOUS BLD VENIPUNCTURE: CPT | Performed by: INTERNAL MEDICINE

## 2023-04-20 PROCEDURE — 3066F NEPHROPATHY DOC TX: CPT | Mod: CPTII,S$GLB,, | Performed by: NURSE PRACTITIONER

## 2023-04-20 PROCEDURE — 3074F SYST BP LT 130 MM HG: CPT | Mod: CPTII,S$GLB,, | Performed by: INTERNAL MEDICINE

## 2023-04-20 PROCEDURE — 99999 PR PBB SHADOW E&M-EST. PATIENT-LVL IV: CPT | Mod: PBBFAC,,, | Performed by: INTERNAL MEDICINE

## 2023-04-20 PROCEDURE — 99999 PR PBB SHADOW E&M-EST. PATIENT-LVL IV: ICD-10-PCS | Mod: PBBFAC,,, | Performed by: INTERNAL MEDICINE

## 2023-04-20 PROCEDURE — 3078F DIAST BP <80 MM HG: CPT | Mod: CPTII,S$GLB,, | Performed by: INTERNAL MEDICINE

## 2023-04-20 PROCEDURE — 99214 PR OFFICE/OUTPT VISIT, EST, LEVL IV, 30-39 MIN: ICD-10-PCS | Mod: S$GLB,,, | Performed by: NURSE PRACTITIONER

## 2023-04-20 PROCEDURE — 3008F BODY MASS INDEX DOCD: CPT | Mod: CPTII,S$GLB,, | Performed by: NURSE PRACTITIONER

## 2023-04-20 PROCEDURE — 3078F DIAST BP <80 MM HG: CPT | Mod: CPTII,S$GLB,, | Performed by: NURSE PRACTITIONER

## 2023-04-20 PROCEDURE — 3008F PR BODY MASS INDEX (BMI) DOCUMENTED: ICD-10-PCS | Mod: CPTII,S$GLB,, | Performed by: INTERNAL MEDICINE

## 2023-04-20 RX ORDER — DULOXETIN HYDROCHLORIDE 30 MG/1
30 CAPSULE, DELAYED RELEASE ORAL DAILY
Qty: 90 CAPSULE | Refills: 3 | Status: CANCELLED | OUTPATIENT
Start: 2023-04-20

## 2023-04-20 RX ORDER — CHLORTHALIDONE 25 MG/1
25 TABLET ORAL DAILY
Qty: 30 TABLET | Refills: 1
Start: 2023-04-20 | End: 2023-04-20 | Stop reason: SDUPTHER

## 2023-04-20 RX ORDER — AMLODIPINE BESYLATE 10 MG/1
10 TABLET ORAL DAILY
Qty: 90 TABLET | Refills: 0 | Status: SHIPPED | OUTPATIENT
Start: 2023-04-20 | End: 2023-06-20

## 2023-04-20 RX ORDER — ALLOPURINOL 300 MG/1
300 TABLET ORAL DAILY
Qty: 90 TABLET | Refills: 4 | Status: SHIPPED | OUTPATIENT
Start: 2023-04-20 | End: 2023-07-19

## 2023-04-20 RX ORDER — CHLORTHALIDONE 25 MG/1
25 TABLET ORAL DAILY
Qty: 30 TABLET | Refills: 1 | Status: SHIPPED | OUTPATIENT
Start: 2023-04-20 | End: 2023-12-08

## 2023-04-20 NOTE — PROGRESS NOTES
Chief Complaint   Patient presents with    Disease Management     Patient with chronic gout for a follow up    History of presenting illness    72 year old black female comes with long standing gout since April 2017    Episode : right big toe and ankle  : redness,swelling and pain : couldn't walk,couldnt have a sheet touch her  She had fluid analysis : gout crystals  They gave her colcrys   Symptoms went away    Last I saw her in July she had left elbow effusion/gouty flare    Vianca xrays with spurring  Knee xrays with OA ON THE RIGHT  Left one replaced  LS spine OA  Hand and wrist xrays     Has had uric acids of > 10  .1  JOSE MIGUEL negative  ANCA negative    On allopurinol 100 mg since April 2017  We increased the dose to 200 mg  She has had uric acids 6.2/6.9 since    10/2018 :  we increased her allopurinol to 300 mg  Her uric acid went up to 7.6    We didn't change the dose of allopurinol since we were worried about the kidney function    1/2/2019 her uric acid is 5.2  4/4/2019 uric acid is 5.3    No more gout attacks    Off colchicine      Labs     GFR 32.2  LFTs nml  H/h 9.8/31.6  nml white count,plts    Hematology says anemia is due to CKD     3/2022    No gout attacks  On allopurinol 300 mg daily  Chronic low back pain and knee pain    2019-LS spine xray  Degenerative changes with grade 1 anterolisthesis of L5 on S1.  Well-circumscribed L3 sclerotic focus, which has slightly increased in size since 2014; however favored to represent a benign process such as an enlarging bone island/enostosis.      Uric acid 6.1    4/2023    Right knee replacement -planned but has to lose another 10 pounds    Low back hurts when she walks    Right shoulder PT -HELPED WITH the right shoulder pain    On allopurinol 300 mg daily  No gout attacks    Anemia persists  9.8/30.8  Sees hematology   EGD and colonoscopy pending  Nml white count,plts    Androgenic alopecia -improving with steroid injections    Past history : DM,RUFINA,prior  stroke,PAD,LS spine OA,knee OA,htn,hld    Family history : no family h/o gout    Social history : smoked in her high school years  Used to drink,not anymore    Review of Systems   Constitutional:  Negative for activity change, appetite change, chills, diaphoresis, fatigue, fever and unexpected weight change.   HENT:  Negative for congestion, dental problem, drooling, ear discharge, ear pain, facial swelling, hearing loss, mouth sores, nosebleeds, postnasal drip, rhinorrhea, sinus pressure, sinus pain, sneezing, sore throat, tinnitus, trouble swallowing and voice change.    Eyes:  Negative for photophobia, pain, discharge, redness, itching and visual disturbance.   Respiratory:  Negative for apnea, cough, choking, chest tightness, shortness of breath, wheezing and stridor.    Cardiovascular:  Negative for chest pain, palpitations and leg swelling.   Gastrointestinal:  Negative for abdominal distention, abdominal pain, anal bleeding, blood in stool, constipation, diarrhea, nausea, rectal pain and vomiting.   Endocrine: Negative for cold intolerance, heat intolerance, polydipsia, polyphagia and polyuria.   Genitourinary:  Negative for decreased urine volume, difficulty urinating, dysuria, enuresis, flank pain, frequency, genital sores, hematuria and urgency.   Musculoskeletal:  Positive for arthralgias. Negative for back pain, gait problem, joint swelling, myalgias, neck pain and neck stiffness.   Skin:  Negative for color change, pallor, rash and wound.   Allergic/Immunologic: Negative for environmental allergies, food allergies and immunocompromised state.   Neurological:  Negative for dizziness, tremors, seizures, syncope, facial asymmetry, speech difficulty, weakness, light-headedness, numbness and headaches.   Hematological:  Negative for adenopathy. Does not bruise/bleed easily.   Psychiatric/Behavioral:  Negative for agitation, behavioral problems, confusion, decreased concentration, dysphoric mood,  hallucinations, self-injury, sleep disturbance and suicidal ideas. The patient is not nervous/anxious and is not hyperactive.    Physical Exam     Right knee tender  Lumbar spine tender    Physical Exam   Constitutional: She is oriented to person, place, and time. No distress.   HENT:   Head: Normocephalic.   Mouth/Throat: Oropharynx is clear and moist.   Eyes: Pupils are equal, round, and reactive to light. Conjunctivae are normal. Right eye exhibits no discharge. Left eye exhibits no discharge. No scleral icterus.   Neck: No thyromegaly present.   Cardiovascular: Normal rate, regular rhythm and normal heart sounds.   Pulmonary/Chest: Effort normal and breath sounds normal. No stridor.   Abdominal: Soft. Bowel sounds are normal.   Musculoskeletal:         General: Normal range of motion.      Cervical back: Normal range of motion.   Lymphadenopathy:     She has no cervical adenopathy.   Neurological: She is alert and oriented to person, place, and time.   Skin: Skin is warm. No rash noted. She is not diaphoretic.   Psychiatric: Affect and judgment normal.       Assessment     72 year old black female with DM,RUFINA,prior stroke,PAD,LS spine OA,knee OA,htn,hld  She presented with one episode of big toe and ankle pain and swelling on the right foot a year ago which resolved with colcrys and another episode of acute onset pain and swelling of the left elbow  She has had a fluid analysis once in the past which has revealed gout crystals she mentions  This is not available at [facility]    She has had hyperuricemia and she has been on allopurinol 100 mg since April 2017  Uric acids have trended down from 10 + to 7.2 while on allopurinol    We have increased the dose of allopurinol to 300 mg     We saw her during her left elbow flare,this responded to prednisone    She has no complaints today    We have achieved uric acid of less than 6  5.6 - march 2021    No recent gout flares    The CBC,CMP ARE MONITORED       Chronic low  back pain and knee pain- main complaint today    2019-LS spine xray  Degenerative changes with grade 1 anterolisthesis of L5 on S1.  Well-circumscribed L3 sclerotic focus, which has slightly increased in size since 2014; however favored to represent a benign process such as an enlarging bone island/enostosis.    2022 LS spine xray  Five lumbar vertebral bodies.  Vertebral body heights are maintained.  Stable sclerotic focus at L3.  Disc space narrowing endplate changes L5-S1.  Facet arthropathy L4-5 and L5-S1.  Grade 1 anterolisthesis L4-5.    2023 Feb knee xray  Knee three views right: There is DJD and a varus deformity.  No fracture dislocation bone destruction seen.     2022 knee xray  Right: There is DJD and a varus deformity.  No fracture dislocation bone destruction seen.  No OCD seen.  Left: There is a TKA in place good alignment no complication.      4/2023    Right knee replacement -planned but has to lose another 10 pounds  Right knee steroid injection worked for a good while in the past  But now it works for a short period only    Low back hurts when she walks    Right shoulder PT -HELPED WITH the right shoulder pain    On allopurinol 300 mg daily  No gout attacks    Anemia persists  9.8/30.8  Sees hematology - They think this is multifactorial  EGD and colonoscopy pending  FOBT negative  Of note, she had an EGD, colonoscopy, and video capsule swallow 4 years ago.  A tubular adenoma was removed from her colon while her EGD had shown patchy mildly erythematous mucosa without bleeding in the gastric body.  Biopsies were negative for malignancy.  The video capsule swallow was unremarkable although it was not an optimal study.    Nml white count,plts    Androgenic alopecia -improving with steroid injections      1. Primary osteoarthritis of right knee    2. Idiopathic chronic gout of multiple sites without tophus    3. Osteoarthritis of lumbar spine, unspecified spinal osteoarthritis complication status           Reviewed labs/xrays  Reviewed medications    F/u problem      She will ask ortho- and try and get the synvisc injection    She will see pain management for the low back pain    Continue 300 mg allopurinol  Medrol dose packs for flares  Uric acid today    Dietary modifications discussed : gave a hand out of all foods to avoid : avoid organ meat,red meat,seafood,shell fish,anchovies,sardines,alcohol particularly beer,fructose containing soft drinks  Its ok to consume moderate wine,diet drinks,dairy proteins,coffee  Eat cherries  Eat purine rich vegetables    Labs and rtc in 12 months     Right knee OA  Ortho- follows up   Right knee PT    LS spine xray  Pain management-injections  [facility] healthy back vs PT    rtc in a year    Annika was seen today for disease management.    Diagnoses and all orders for this visit:    Primary osteoarthritis of right knee  -     Ambulatory referral/consult to Physical/Occupational Therapy; Future    Idiopathic chronic gout of multiple sites without tophus  -     Uric Acid; Future    Osteoarthritis of lumbar spine, unspecified spinal osteoarthritis complication status  -     Uric Acid; Future    Other orders  -     allopurinoL (ZYLOPRIM) 300 MG tablet; Take 1 tablet (300 mg total) by mouth once daily.

## 2023-04-20 NOTE — TELEPHONE ENCOUNTER
----- Message from Kalyani Lao sent at 4/20/2023  9:58 AM CDT -----  Regarding: Scheduling  Please contact patient for an appointment in August for a follow-up, there were no appts available to me

## 2023-04-21 DIAGNOSIS — N18.30 ANEMIA IN STAGE 3 CHRONIC KIDNEY DISEASE, UNSPECIFIED WHETHER STAGE 3A OR 3B CKD: Primary | ICD-10-CM

## 2023-04-21 DIAGNOSIS — D63.1 ANEMIA IN STAGE 3 CHRONIC KIDNEY DISEASE, UNSPECIFIED WHETHER STAGE 3A OR 3B CKD: Primary | ICD-10-CM

## 2023-04-25 ENCOUNTER — ANESTHESIA EVENT (OUTPATIENT)
Dept: ENDOSCOPY | Facility: HOSPITAL | Age: 73
End: 2023-04-25
Payer: MEDICARE

## 2023-04-26 ENCOUNTER — ANESTHESIA (OUTPATIENT)
Dept: ENDOSCOPY | Facility: HOSPITAL | Age: 73
End: 2023-04-26
Payer: MEDICARE

## 2023-04-26 ENCOUNTER — HOSPITAL ENCOUNTER (OUTPATIENT)
Facility: HOSPITAL | Age: 73
Discharge: HOME | End: 2023-04-26
Attending: STUDENT IN AN ORGANIZED HEALTH CARE EDUCATION/TRAINING PROGRAM | Admitting: STUDENT IN AN ORGANIZED HEALTH CARE EDUCATION/TRAINING PROGRAM
Payer: MEDICARE

## 2023-04-26 ENCOUNTER — TELEPHONE (OUTPATIENT)
Dept: INTERNAL MEDICINE | Facility: CLINIC | Age: 73
End: 2023-04-26
Payer: MEDICARE

## 2023-04-26 ENCOUNTER — TELEPHONE (OUTPATIENT)
Dept: GASTROENTEROLOGY | Facility: CLINIC | Age: 73
End: 2023-04-26
Payer: MEDICARE

## 2023-04-26 VITALS
SYSTOLIC BLOOD PRESSURE: 150 MMHG | OXYGEN SATURATION: 93 % | DIASTOLIC BLOOD PRESSURE: 79 MMHG | WEIGHT: 291 LBS | BODY MASS INDEX: 48.48 KG/M2 | RESPIRATION RATE: 14 BRPM | TEMPERATURE: 98 F | HEART RATE: 70 BPM | HEIGHT: 65 IN

## 2023-04-26 DIAGNOSIS — Z12.11 COLON CANCER SCREENING: ICD-10-CM

## 2023-04-26 DIAGNOSIS — Z12.31 ENCOUNTER FOR SCREENING MAMMOGRAM FOR BREAST CANCER: Primary | ICD-10-CM

## 2023-04-26 LAB
POCT GLUCOSE: 132 MG/DL (ref 70–110)
POCT GLUCOSE: 166 MG/DL (ref 70–110)

## 2023-04-26 PROCEDURE — D9220A PRA ANESTHESIA: Mod: CRNA,,, | Performed by: NURSE ANESTHETIST, CERTIFIED REGISTERED

## 2023-04-26 PROCEDURE — 88305 TISSUE EXAM BY PATHOLOGIST: CPT | Performed by: PATHOLOGY

## 2023-04-26 PROCEDURE — 88342 IMHCHEM/IMCYTCHM 1ST ANTB: CPT | Mod: 26,,, | Performed by: PATHOLOGY

## 2023-04-26 PROCEDURE — 37000008 HC ANESTHESIA 1ST 15 MINUTES: Performed by: STUDENT IN AN ORGANIZED HEALTH CARE EDUCATION/TRAINING PROGRAM

## 2023-04-26 PROCEDURE — 88305 TISSUE EXAM BY PATHOLOGIST: CPT | Mod: 26,,, | Performed by: PATHOLOGY

## 2023-04-26 PROCEDURE — 45385 PR COLONOSCOPY,REMV LESN,SNARE: ICD-10-PCS | Mod: PT,,, | Performed by: STUDENT IN AN ORGANIZED HEALTH CARE EDUCATION/TRAINING PROGRAM

## 2023-04-26 PROCEDURE — D9220A PRA ANESTHESIA: ICD-10-PCS | Mod: ANES,,, | Performed by: STUDENT IN AN ORGANIZED HEALTH CARE EDUCATION/TRAINING PROGRAM

## 2023-04-26 PROCEDURE — 88341 IMHCHEM/IMCYTCHM EA ADD ANTB: CPT | Mod: 26,,, | Performed by: PATHOLOGY

## 2023-04-26 PROCEDURE — 45385 COLONOSCOPY W/LESION REMOVAL: CPT | Mod: PT,,, | Performed by: STUDENT IN AN ORGANIZED HEALTH CARE EDUCATION/TRAINING PROGRAM

## 2023-04-26 PROCEDURE — 27201089 HC SNARE, DISP (ANY): Performed by: STUDENT IN AN ORGANIZED HEALTH CARE EDUCATION/TRAINING PROGRAM

## 2023-04-26 PROCEDURE — 43235 EGD DIAGNOSTIC BRUSH WASH: CPT | Performed by: STUDENT IN AN ORGANIZED HEALTH CARE EDUCATION/TRAINING PROGRAM

## 2023-04-26 PROCEDURE — 88341 PR IHC OR ICC EACH ADD'L SINGLE ANTIBODY  STAINPR: ICD-10-PCS | Mod: 26,,, | Performed by: PATHOLOGY

## 2023-04-26 PROCEDURE — 82962 GLUCOSE BLOOD TEST: CPT | Performed by: STUDENT IN AN ORGANIZED HEALTH CARE EDUCATION/TRAINING PROGRAM

## 2023-04-26 PROCEDURE — 88342 CHG IMMUNOCYTOCHEMISTRY: ICD-10-PCS | Mod: 26,,, | Performed by: PATHOLOGY

## 2023-04-26 PROCEDURE — 88305 TISSUE EXAM BY PATHOLOGIST: ICD-10-PCS | Mod: 26,,, | Performed by: PATHOLOGY

## 2023-04-26 PROCEDURE — 25000003 PHARM REV CODE 250: Performed by: NURSE ANESTHETIST, CERTIFIED REGISTERED

## 2023-04-26 PROCEDURE — 43235 EGD DIAGNOSTIC BRUSH WASH: CPT | Mod: 51,,, | Performed by: STUDENT IN AN ORGANIZED HEALTH CARE EDUCATION/TRAINING PROGRAM

## 2023-04-26 PROCEDURE — D9220A PRA ANESTHESIA: ICD-10-PCS | Mod: CRNA,,, | Performed by: NURSE ANESTHETIST, CERTIFIED REGISTERED

## 2023-04-26 PROCEDURE — 63600175 PHARM REV CODE 636 W HCPCS: Performed by: NURSE ANESTHETIST, CERTIFIED REGISTERED

## 2023-04-26 PROCEDURE — 88342 IMHCHEM/IMCYTCHM 1ST ANTB: CPT | Performed by: PATHOLOGY

## 2023-04-26 PROCEDURE — 88341 IMHCHEM/IMCYTCHM EA ADD ANTB: CPT | Performed by: PATHOLOGY

## 2023-04-26 PROCEDURE — 45385 COLONOSCOPY W/LESION REMOVAL: CPT | Mod: PT | Performed by: STUDENT IN AN ORGANIZED HEALTH CARE EDUCATION/TRAINING PROGRAM

## 2023-04-26 PROCEDURE — 43235 PR EGD, FLEX, DIAGNOSTIC: ICD-10-PCS | Mod: 51,,, | Performed by: STUDENT IN AN ORGANIZED HEALTH CARE EDUCATION/TRAINING PROGRAM

## 2023-04-26 PROCEDURE — D9220A PRA ANESTHESIA: Mod: ANES,,, | Performed by: STUDENT IN AN ORGANIZED HEALTH CARE EDUCATION/TRAINING PROGRAM

## 2023-04-26 PROCEDURE — 37000009 HC ANESTHESIA EA ADD 15 MINS: Performed by: STUDENT IN AN ORGANIZED HEALTH CARE EDUCATION/TRAINING PROGRAM

## 2023-04-26 RX ORDER — PROPOFOL 10 MG/ML
VIAL (ML) INTRAVENOUS
Status: DISCONTINUED | OUTPATIENT
Start: 2023-04-26 | End: 2023-04-26

## 2023-04-26 RX ORDER — FENTANYL CITRATE 50 UG/ML
INJECTION, SOLUTION INTRAMUSCULAR; INTRAVENOUS
Status: DISCONTINUED | OUTPATIENT
Start: 2023-04-26 | End: 2023-04-26

## 2023-04-26 RX ORDER — SODIUM CHLORIDE 9 MG/ML
INJECTION, SOLUTION INTRAVENOUS CONTINUOUS
Status: DISCONTINUED | OUTPATIENT
Start: 2023-04-26 | End: 2023-04-26 | Stop reason: HOSPADM

## 2023-04-26 RX ORDER — LIDOCAINE HYDROCHLORIDE 20 MG/ML
INJECTION INTRAVENOUS
Status: DISCONTINUED | OUTPATIENT
Start: 2023-04-26 | End: 2023-04-26

## 2023-04-26 RX ORDER — DEXAMETHASONE SODIUM PHOSPHATE 4 MG/ML
INJECTION, SOLUTION INTRA-ARTICULAR; INTRALESIONAL; INTRAMUSCULAR; INTRAVENOUS; SOFT TISSUE
Status: DISCONTINUED | OUTPATIENT
Start: 2023-04-26 | End: 2023-04-26

## 2023-04-26 RX ORDER — SUCCINYLCHOLINE CHLORIDE 20 MG/ML
INJECTION INTRAMUSCULAR; INTRAVENOUS
Status: DISCONTINUED | OUTPATIENT
Start: 2023-04-26 | End: 2023-04-26

## 2023-04-26 RX ORDER — ONDANSETRON 2 MG/ML
INJECTION INTRAMUSCULAR; INTRAVENOUS
Status: DISCONTINUED | OUTPATIENT
Start: 2023-04-26 | End: 2023-04-26

## 2023-04-26 RX ADMIN — DEXAMETHASONE SODIUM PHOSPHATE 4 MG: 4 INJECTION, SOLUTION INTRAMUSCULAR; INTRAVENOUS at 08:04

## 2023-04-26 RX ADMIN — ONDANSETRON 4 MG: 2 INJECTION INTRAMUSCULAR; INTRAVENOUS at 08:04

## 2023-04-26 RX ADMIN — FENTANYL CITRATE 50 MCG: 50 INJECTION, SOLUTION INTRAMUSCULAR; INTRAVENOUS at 08:04

## 2023-04-26 RX ADMIN — LIDOCAINE HYDROCHLORIDE 100 MG: 20 INJECTION INTRAVENOUS at 08:04

## 2023-04-26 RX ADMIN — SUCCINYLCHOLINE CHLORIDE 120 MG: 20 INJECTION, SOLUTION INTRAMUSCULAR; INTRAVENOUS at 08:04

## 2023-04-26 RX ADMIN — PROPOFOL 120 MG: 10 INJECTION, EMULSION INTRAVENOUS at 08:04

## 2023-04-26 RX ADMIN — SODIUM CHLORIDE: 0.9 INJECTION, SOLUTION INTRAVENOUS at 08:04

## 2023-04-26 NOTE — ANESTHESIA PREPROCEDURE EVALUATION
Pre-operative evaluation for Procedure(s) (LRB):  EGD (ESOPHAGOGASTRODUODENOSCOPY) (N/A)  COLONOSCOPY (N/A)    Annika Mac is a 72 y.o. female w/ hx of DM2 (taking Trulicity; c/b polyneuropathy and CKD), HTN, COPD, chronic pain, RUFINA (noncompliant w/ CPAP) and hx of colon polyps and esophageal dysphagia who presents for above procedure.       Prev airway: none on record      EKG (11/8/2021):   Vent. Rate : 068 BPM     Atrial Rate : 068 BPM      P-R Int : 188 ms          QRS Dur : 088 ms       QT Int : 430 ms       P-R-T Axes : 047 006 058 degrees      QTc Int : 457 ms     Normal sinus rhythm   Normal ECG     2D Echo (3/14/22):    The left ventricle is normal in size with concentric remodeling and normal systolic function. The estimated ejection fraction is 60%.   Normal right ventricular size with normal right ventricular systolic function.   Grade I left ventricular diastolic dysfunction.   The estimated PA systolic pressure is 39 mmHg.   Normal central venous pressure (3 mmHg).   The ECG portion of this study is negative for myocardial ischemia.   The stress echo portion of this study is negative for myocardial ischemia.   Overall, this study is negative for myocardial ischemia.      Patient Active Problem List   Diagnosis    RUFINA (obstructive sleep apnea)    History of stroke    Type 2 diabetes mellitus with stage 3b chronic kidney disease and hypertension    Epiretinal membrane    Gout, chronic    Lumbar facet arthropathy    Cataract fragments in eye following cataract surgery    PCO (posterior capsular opacification)    Peripheral autonomic neuropathy due to DM    Diastolic dysfunction    Lumbar spinal stenosis    Essential hypertension    Primary osteoarthritis of right knee    Cough syncope    GERD (gastroesophageal reflux disease)    Osteoarthritis of spine with radiculopathy, cervical region    Radicular pain in left arm    Type 2 diabetes mellitus with both eyes affected by  proliferative retinopathy and macular edema, with long-term current use of insulin    Iron deficiency anemia    Secondary hyperparathyroidism    Elbow pain, left    Stage 3b chronic kidney disease    Ectatic aorta    Anemia in chronic kidney disease (CKD)    Morbid obesity    Hypoglycemia unawareness associated with type 2 diabetes mellitus    Constipation    Unspecified inflammatory spondylopathy, lumbar region    Cervical myelopathy    Left renal mass    Pain in both hands    Anemia of renal disease    Asthma    Arthritis of carpometacarpal (CMC) joint of left thumb    Pain of left thumb    Decreased sensation    Chronic bilateral low back pain without sciatica    Difficulty walking    DDD (degenerative disc disease), lumbar    Chronic pain    Dyslipidemia associated with type 2 diabetes mellitus    Acute osteomyelitis    Osteomyelitis, chronic, ankle or foot, left    Irradiation cystitis with hematuria    Polyneuropathy    Hypertensive heart and chronic kidney disease with heart failure and stage 1 through stage 4 chronic kidney disease, or unspecified chronic kidney disease    Isolated proteinuria    MCFADDEN (dyspnea on exertion)    Decreased range of motion of lumbar spine    Dizziness    Hair loss       Review of patient's allergies indicates:   Allergen Reactions    Clindamycin Hives and Swelling    Iodinated contrast media Rash        Current Facility-Administered Medications on File Prior to Encounter   Medication Dose Route Frequency Provider Last Rate Last Admin    fentaNYL injection 25 mcg  25 mcg Intravenous Q5 Min PRN Desmond Fontana MD   50 mcg at 01/08/21 0955    midazolam (VERSED) 1 mg/mL injection 0.5 mg  0.5 mg Intravenous PRN Desmond Fontana MD   2 mg at 01/08/21 0955     Current Outpatient Medications on File Prior to Encounter   Medication Sig Dispense Refill    albuterol (PROAIR HFA) 90 mcg/actuation inhaler Inhale 2 puffs into the lungs  "every 6 (six) hours as needed for Wheezing. Rescue 18 g 6    aspirin 81 MG Chew Take 1 tablet (81 mg total) by mouth once daily.  0    atorvastatin (LIPITOR) 20 MG tablet Take 1 tablet (20 mg total) by mouth once daily. 90 tablet 3    BD ULTRA-FINE KIKA PEN NEEDLE 32 gauge x 5/32" Ndle To use 4 times daily 200 each 20    blood-glucose meter kit 1 each by Other route once daily. Use daily. Insurance proffered one touch  E11.42 1 each 0    CONSTULOSE 10 gram/15 mL solution SMARTSI Milliliter(s) By Mouth Twice Daily PRN      dulaglutide (TRULICITY) 4.5 mg/0.5 mL pen injector Inject 4.5 mg into the skin every 7 days. 12 pen 3    DULoxetine (CYMBALTA) 30 MG capsule TAKE 1 CAPSULE BY MOUTH  ONCE DAILY 90 capsule 3    empagliflozin (JARDIANCE) 10 mg tablet Take 1 tablet (10 mg total) by mouth once daily. 90 tablet 3    famotidine (PEPCID) 20 MG tablet Take 1 tablet (20 mg total) by mouth 2 (two) times daily. 1 tablet 0    finasteride (PROSCAR) 5 mg tablet Take 1 tablet (5 mg total) by mouth once daily. 30 tablet 11    glucagon (GVOKE HYPOPEN 1-PACK) 0.5 mg/0.1 mL AtIn Inject 1 Dose into the skin daily as needed (for severe hypoglycemia if you aren't able to treat by ingesting sugar). 1 Syringe 3    HUMALOG KWIKPEN INSULIN 100 unit/mL pen INJECT SUBCUTANEOUSLY 11  UNITS INTO THE SKIN 3 TIMES DAILY BEFORE MEALS PLUS  SLIDING SCALE - MAX TOTAL  DAILY DOSE 63 UNITS 30 mL 6    irbesartan (AVAPRO) 300 MG tablet TAKE 1 TABLET BY MOUTH IN  THE EVENING 90 tablet 3    ketoconazole (NIZORAL) 2 % shampoo Wash hair with medicated shampoo at least 2x/week - let sit on scalp at least 5 minutes prior to rinsing 120 mL 5    labetaloL (NORMODYNE) 300 MG tablet Take 1 tablet (300 mg total) by mouth 2 (two) times daily. 180 tablet 3    lactulose (CHRONULAC) 20 gram/30 mL Soln Take 30 mLs (20 g total) by mouth 2 (two) times daily as needed (constipation). 1800 mL 2    lancets 31 gauge Misc 1 lancet by Misc.(Non-Drug; Combo " Route) route 4 (four) times daily. E11.42 . Prescribed one touch 200 each 6    multivitamin (THERAGRAN) per tablet Take 1 tablet by mouth once daily.      pregabalin (LYRICA) 150 MG capsule Take 1 capsule (150 mg total) by mouth 2 (two) time daily. 60 capsule 11    sodium bicarbonate 325 MG tablet Take 2 tablets (650 mg total) by mouth 2 (two) times daily. 120 tablet 11    [DISCONTINUED] insulin glargine, TOUJEO, (TOUJEO SOLOSTAR) 300 unit/mL (1.5 mL) InPn pen INJECT 40 UNITS INTO THE SKIN EVERY EVENING TITRATE UP AS DIRECTED. 4.5 mL 0       Past Surgical History:   Procedure Laterality Date    CATARACT EXTRACTION W/  INTRAOCULAR LENS IMPLANT Left 3/2002    left     CATARACT EXTRACTION W/  INTRAOCULAR LENS IMPLANT Right     OD dr. olivares     SECTION      COLONOSCOPY N/A 2018    Procedure: COLONOSCOPY;  Surgeon: Faizan Mac MD;  Location: [facility] (2ND FLR);  Service: Endoscopy;  Laterality: N/A;    CYST REMOVAL  2011    left side of face    CYSTOSCOPY W/ RETROGRADES Left 2020    Procedure: CYSTOSCOPY, WITH RETROGRADE PYELOGRAM;  Surgeon: Noman Rivas MD;  Location: [address] FLR;  Service: Urology;  Laterality: Left;  30min    DE QUERVAIN'S RELEASE  2021    Procedure: RELEASE, HAND, FOR DEQUERVAIN'S TENOSYNOVITIS;  Surgeon: Marky Hagen MD;  Location: [facility] OR;  Service: Orthopedics;;    EYE SURGERY Bilateral     eye implants    GANGLION CYST EXCISION  2007    Right wrist    INJECTION OF ANESTHETIC AGENT AROUND MEDIAL BRANCH NERVES INNERVATING LUMBAR FACET JOINT Bilateral 2022    Procedure: Block-nerve-medial branch-lumbar bilateral L4-5 and L5-S1;  Surgeon: Alireza Meraz Jr., MD;  Location: [facility] PAIN T;  Service: Pain Management;  Laterality: Bilateral;  pt is diabetic   asa    INJECTION OF ANESTHETIC AGENT AROUND MEDIAL BRANCH NERVES INNERVATING LUMBAR FACET JOINT Bilateral 2022    Procedure: Block-nerve-medial  branch-lumbar bilaterl L4-5 and L5-S1;  Surgeon: Alireza Meraz Jr., MD;  Location: [facility] PAIN MGT;  Service: Pain Management;  Laterality: Bilateral;  pt is diabetic     INJECTION OF FACET JOINT Bilateral 5/6/2021    Procedure: INJECTION, FACET JOINT--Bilateral L4-5, L5-S1;  Surgeon: Alireza Meraz Jr., MD;  Location: [facility] PAIN MGT;  Service: Pain Management;  Laterality: Bilateral;  Oral    INTERPOSITION ARTHROPLASTY OF CARPOMETACARPAL JOINTS Left 1/8/2021    Procedure: INTERPOSITION ARTHROPLASTY, CMC JOINT - left dequervains and cmc arthroplasty, arthrex;  Surgeon: Marky Hagen MD;  Location[facility];  Service: Orthopedics;  Laterality: Left;    JOINT REPLACEMENT Left     Pan Retinal Photocoagulation Bilateral     Dr. Monterroso (Proliferative Diabetic Retinopathy)    RADIOFREQUENCY THERMOCOAGULATION Right 5/12/2022    Procedure: RADIOFREQUENCY THERMAL COAGULATION RIGHT L4-5, L5-S1 (IV Sedation);  Surgeon: Alireza Meraz Jr., MD;  Location: [facility] PAIN MGT;  Service: Pain Management;  Laterality: Right;  diabetic    RADIOFREQUENCY THERMOCOAGULATION Left 5/19/2022    Procedure: RADIOFREQUENCY THERMAL COAGULATION LEFT L4-5, L5-S1 (IV Sedation);  Surgeon: Alireza Meraz Jr., MD;  Location: [facility] PAIN MGT;  Service: Pain Management;  Laterality: Left;  diabetic    TOTAL ABDOMINAL HYSTERECTOMY  1982    TOTAL KNEE ARTHROPLASTY  2/2006    left    TRIGGER FINGER RELEASE  9/18/2009       Social History     Socioeconomic History    Marital status:      Spouse name: Heber    Number of children: 3   Occupational History    Occupation:    Tobacco Use    Smoking status: Never    Smokeless tobacco: Never   Substance and Sexual Activity    Alcohol use: No    Drug use: No    Sexual activity: Yes     Partners: Male   Social History Narrative    , lives with family; 3 children, 4 grandchildren     Social Determinants of Health     Financial Resource Strain: Low Risk      Difficulty of Paying Living Expenses: Not very hard   Food Insecurity: No Food Insecurity    Worried About Running Out of Food in the Last Year: Never true    Ran Out of Food in the Last Year: Never true   Transportation Needs: No Transportation Needs    Lack of Transportation (Medical): No    Lack of Transportation (Non-Medical): No   Physical Activity: Inactive    Days of Exercise per Week: 0 days    Minutes of Exercise per Session: 0 min   Stress: No Stress Concern Present    Feeling of Stress : Only a little   Social Connections: Socially Integrated    Frequency of Communication with Friends and Family: More than three times a week    Frequency of Social Gatherings with Friends and Family: Twice a week    Attends Mandaen Services: More than 4 times per year    Active Member of Clubs or Organizations: Yes    Attends Club or Organization Meetings: More than 4 times per year    Marital Status:    Housing Stability: Low Risk     Unable to Pay for Housing in the Last Year: No    Number of Places Lived in the Last Year: 1    Unstable Housing in the Last Year: No         Vital Signs Range (Last 24H):         CBC: No results for input(s): WBC, RBC, HGB, HCT, PLT, MCV, MCH, MCHC in the last 72 hours.    CMP: No results for input(s): NA, K, CL, CO2, BUN, CREATININE, GLU, MG, PHOS, CALCIUM, ALBUMIN, PROT, ALKPHOS, ALT, AST, BILITOT in the last 72 hours.    INR  No results for input(s): PT, INR, PROTIME, APTT in the last 72 hours.                Pre-op Assessment    I have reviewed the Patient Summary Reports.     I have reviewed the Nursing Notes. I have reviewed the NPO Status.   I have reviewed the Medications.     Review of Systems  Anesthesia Hx:  No problems with previous Anesthesia   Denies Personal Hx of Anesthesia complications.   EENT/Dental:EENT/Dental Normal   Cardiovascular:   Hypertension    Pulmonary:   COPD Asthma Sleep Apnea    Renal/:   Chronic Renal Disease    Hepatic/GI:   GERD     Musculoskeletal:   Arthritis     Neurological:   Neuromuscular Disease,    Endocrine:   Diabetes, type 2  Obesity / BMI > 30      Physical Exam  General: Alert and Oriented    Airway:  Mallampati: II   Mouth Opening: Normal  TM Distance: Normal  Tongue: Normal    Dental:  Intact    Chest/Lungs:  Clear to auscultation, Normal Respiratory Rate    Heart:  Rate: Normal  Rhythm: Regular Rhythm        Anesthesia Plan  Type of Anesthesia, risks & benefits discussed:    Anesthesia Type: Gen ETT, Gen Natural Airway  Intra-op Monitoring Plan: Standard ASA Monitors  Post Op Pain Control Plan: IV/PO Opioids PRN and multimodal analgesia  Induction:  IV  Airway Plan: Direct and Video  Informed Consent: Informed consent signed with the Patient and all parties understand the risks and agree with anesthesia plan.  All questions answered.   ASA Score: 3  Day of Surgery Review of History & Physical: H&P Update referred to the surgeon/provider.    Ready For Surgery From Anesthesia Perspective.     .

## 2023-04-26 NOTE — TELEPHONE ENCOUNTER
----- Message from Zaynabjonas Estrada sent at 4/25/2023  4:48 PM CDT -----  Type:  Mammogram    Caller is requesting to schedule their annual mammogram appointment.  Order is not listed in EPIC.  Please enter order and contact patient to schedule.  Name of Caller: pt  [facility]   Would the patient rather a call back or a response via [facility]? Call   Best Call Back Number:[phone]  Additional Information: due in June

## 2023-04-26 NOTE — TRANSFER OF CARE
"Anesthesia Transfer of Care Note    Patient: Annika Mac    Procedure(s) Performed: Procedure(s) (LRB):  EGD (ESOPHAGOGASTRODUODENOSCOPY) (N/A)  COLONOSCOPY (N/A)    Patient location: [facility]    Anesthesia Type: general    Transport from OR: Transported from OR on 6-10 L/min O2 by face mask with adequate spontaneous ventilation    Post pain: adequate analgesia    Post assessment: no apparent anesthetic complications    Post vital signs: stable    Level of consciousness: awake    Nausea/Vomiting: no nausea/vomiting    Complications: none    Transfer of care protocol was followed      Last vitals:   Visit Vitals  BP (!) 149/67 (BP Location: Left arm, Patient Position: Lying)   Pulse 77   Temp 36.5 °C (97.7 °F) (Temporal)   Resp 18   Ht 5' 5" (1.651 m)   Wt 132 kg (291 lb)   SpO2 100%   Breastfeeding No   BMI 48.42 kg/m²     "

## 2023-04-26 NOTE — ANESTHESIA PROCEDURE NOTES
Intubation    Date/Time: 4/26/2023 8:46 AM  Performed by: Cintia Jordan CRNA  Authorized by: Cande Tubbs MD     Intubation:     Induction:  Intravenous    Intubated:  Postinduction    Mask Ventilation:  Easy mask    Attempts:  1    Attempted By:  CRNA    Method of Intubation:  Video laryngoscopy    Blade:  Sen 3    Laryngeal View Grade: Grade I - full view of cords      Difficult Airway Encountered?: No      Complications:  None    Airway Device:  Oral endotracheal tube    Airway Device Size:  7.5    Style/Cuff Inflation:  Cuffed (inflated to minimal occlusive pressure)    Tube secured:  22    Secured at:  The lips    Placement Verified By:  Capnometry    Complicating Factors:  None    Findings Post-Intubation:  BS equal bilateral  Notes:      Dentures would not come out, retained and remain intact and unchanged after intubation

## 2023-04-26 NOTE — TELEPHONE ENCOUNTER
----- Message from Zaynabjonas Estrada sent at 4/25/2023  4:48 PM CDT -----  Type:  Mammogram    Caller is requesting to schedule their annual mammogram appointment.  Order is not listed in EPIC.  Please enter order and contact patient to schedule.  Name of Caller: pt  [facility]   Would the patient rather a call back or a response via [facility]? Call   Best Call Back Number:[phone]  Additional Information: due in June

## 2023-04-26 NOTE — PROVATION PATIENT INSTRUCTIONS
Discharge Summary/Instructions after an Endoscopic Procedure  Patient Name: Annika Mac  Patient MRN: [id number]  Patient YOB: 1950 Wednesday, April 26, 2023  Pa Zamora MD  Dear patient,  As a result of recent federal legislation (The Federal Cures Act), you may   receive lab or pathology results from your procedure in your [facility]   account before your physician is able to contact you. Your physician or   their representative will relay the results to you with their   recommendations at their soonest availability.  Thank you,  RESTRICTIONS:  During your procedure today, you received medications for sedation.  These   medications may affect your judgment, balance and coordination.  Therefore,   for 24 hours, you have the following restrictions:   - DO NOT drive a car, operate machinery, make legal/financial decisions,   sign important papers or drink alcohol.    ACTIVITY:  Today: no heavy lifting, straining or running due to procedural   sedation/anesthesia.  The following day: return to full activity including work.  DIET:  Eat and drink normally unless instructed otherwise.     TREATMENT FOR COMMON SIDE EFFECTS:  - Mild abdominal pain, nausea, belching, bloating or excessive gas:  rest,   eat lightly and use a heating pad.  - Sore Throat: treat with throat lozenges and/or gargle with warm salt   water.  - Because air was used during the procedure, expelling large amounts of air   from your rectum or belching is normal.  - If a bowel prep was taken, you may not have a bowel movement for 1-3 days.    This is normal.  SYMPTOMS TO WATCH FOR AND REPORT TO YOUR PHYSICIAN:  1. Abdominal pain or bloating, other than gas cramps.  2. Chest pain.  3. Back pain.  4. Signs of infection such as: chills or fever occurring within 24 hours   after the procedure.  5. Rectal bleeding, which would show as bright red, maroon, or black stools.   (A tablespoon of blood from the rectum is not serious, especially  if   hemorrhoids are present.)  6. Vomiting.  7. Weakness or dizziness.  GO DIRECTLY TO THE NEAREST EMERGENCY ROOM IF YOU HAVE ANY OF THE FOLLOWING:      Difficulty breathing              Chills and/or fever over 101 F   Persistent vomiting and/or vomiting blood   Severe abdominal pain   Severe chest pain   Black, tarry stools   Bleeding- more than one tablespoon   Any other symptom or condition that you feel may need urgent attention  Your doctor recommends these additional instructions:  If any biopsies were taken, your doctors clinic will contact you in 1 to 2   weeks with any results.  - Discharge patient to home (ambulatory).   - Patient has a contact number available for emergencies.  The signs and   symptoms of potential delayed complications were discussed with the   patient.  Return to normal activities tomorrow.  Written discharge   instructions were provided to the patient.   - Resume previous diet.   - Continue present medications.   - Return to primary care physician as previously scheduled.   - Repeat colonoscopy in 3 years for surveillance.   - Return to GI clinic as previously scheduled.  For questions, problems or results please call your physician - Pa Zamora MD at Work:  [phone].  [facility], EMERGENCY ROOM PHONE NUMBER: [phone]  IF A COMPLICATION OR EMERGENCY SITUATION ARISES AND YOU ARE UNABLE TO REACH   YOUR PHYSICIAN - GO DIRECTLY TO THE EMERGENCY ROOM.  Pa Zamora MD  4/26/2023 9:55:46 AM  This report has been verified and signed electronically.  Dear patient,  As a result of recent federal legislation (The Federal Cures Act), you may   receive lab or pathology results from your procedure in your [facility]   account before your physician is able to contact you. Your physician or   their representative will relay the results to you with their   recommendations at their soonest availability.  Thank you,  PROVATION

## 2023-04-26 NOTE — H&P
Short Stay Endoscopy History and Physical    PCP - Mamie Cotton MD     Procedure - EGD, COLONOSCOPY  ASA - per anesthesia  Mallampati - per anesthesia  History of Anesthesia problems - no  Family history Anesthesia problems -  no   Plan of anesthesia - General    HPI:  This is a 72 y.o. female here for evaluation of :     dysphagia & CRC screening      ROS:  Constitutional: No fevers, chills, No weight loss  CV: No chest pain  Pulm: No cough, No shortness of breath  Ophtho: No vision changes  GI: see HPI  Derm: No rash    Medical History:  has a past medical history of Asthma, Chronic obstructive pulmonary disease, unspecified COPD type (2022), Constipation (10/3/2019), Deep vein thrombophlebitis of left leg (2012), Deep vein thrombosis, Diabetic foot ulcer with osteomyelitis, Encounter for blood transfusion, GERD (gastroesophageal reflux disease), Obesity, diabetes, and hypertension syndrome (2019), Obstructive sleep apnea (2012), PAD (peripheral artery disease) (2013), Pressure ulcer of toe of left foot, Type 2 diabetes mellitus with obesity (2020), and Type 2 diabetes mellitus with peripheral artery disease (2020).    Surgical History:  has a past surgical history that includes Total knee arthroplasty (2006); Total abdominal hysterectomy (); Ganglion cyst excision (2007); Cyst Removal (2011); Trigger finger release (2009);  section; Cataract extraction w/  intraocular lens implant (Left, 3/2002); Cataract extraction w/  intraocular lens implant (Right, ); Pan Retinal Photocoagulation (Bilateral); Eye surgery (Bilateral, ); Colonoscopy (N/A, 2018); Cystoscopy w/ retrogrades (Left, 2020); Joint replacement (Left); Interposition arthroplasty of carpometacarpal joints (Left, 2021); De Quervain's release (2021); Injection of facet joint (Bilateral, 2021); Injection of anesthetic agent around medial branch nerves  innervating lumbar facet joint (Bilateral, 4/5/2022); Injection of anesthetic agent around medial branch nerves innervating lumbar facet joint (Bilateral, 4/19/2022); Radiofrequency thermocoagulation (Right, 5/12/2022); and Radiofrequency thermocoagulation (Left, 5/19/2022).    Family History: family history includes Diabetes in her brother, mother, and sister; Heart disease in her father; Hypertension in her brother and mother; No Known Problems in her brother, daughter, daughter, sister, and son; Thyroid disease in her brother.. Otherwise no colon cancer, inflammatory bowel disease, or GI malignancies.    Social History:  reports that she has never smoked. She has never used smokeless tobacco. She reports that she does not drink alcohol and does not use drugs.    Review of patient's allergies indicates:   Allergen Reactions    Clindamycin Hives and Swelling    Iodinated contrast media Rash       Medications:   Facility-Administered Medications Prior to Admission   Medication Dose Route Frequency Provider Last Rate Last Admin    hyaluronate sodium, stabilized (DUROLANE) 60 mg/3 mL 60 mg  60 mg Intra-articular 1 time in Clinic/HOD Homero Leggett PA-C        triamcinolone acetonide injection 10 mg  10 mg Intradermal Once Susie Curran MD        triamcinolone acetonide injection 10 mg  10 mg Intradermal Once Susie Curran MD         Medications Prior to Admission   Medication Sig Dispense Refill Last Dose    albuterol (PROAIR HFA) 90 mcg/actuation inhaler Inhale 2 puffs into the lungs every 6 (six) hours as needed for Wheezing. Rescue 18 g 6     allopurinoL (ZYLOPRIM) 300 MG tablet Take 1 tablet (300 mg total) by mouth once daily. 90 tablet 4     amLODIPine (NORVASC) 10 MG tablet Take 1 tablet (10 mg total) by mouth once daily. 90 tablet 0     aspirin 81 MG Chew Take 1 tablet (81 mg total) by mouth once daily.  0     atorvastatin (LIPITOR) 20 MG tablet Take 1 tablet (20 mg total) by mouth once daily.  "90 tablet 3     BD ULTRA-FINE KIKA PEN NEEDLE 32 gauge x 5/32" Ndle To use 4 times daily 200 each 20     blood sugar diagnostic Strp 1 strip 4 times daily. Contour Next. 200 strip 11     blood-glucose meter kit 1 each by Other route once daily. Use daily. Insurance proffered one touch  E11.42 1 each 0     chlorthalidone (HYGROTEN) 25 MG Tab Take 1 tablet (25 mg total) by mouth once daily. 30 tablet 1     CONSTULOSE 10 gram/15 mL solution SMARTSI Milliliter(s) By Mouth Twice Daily PRN       dulaglutide (TRULICITY) 4.5 mg/0.5 mL pen injector Inject 4.5 mg into the skin every 7 days. 12 pen 3     DULoxetine (CYMBALTA) 30 MG capsule TAKE 1 CAPSULE BY MOUTH  ONCE DAILY 90 capsule 3     empagliflozin (JARDIANCE) 10 mg tablet Take 1 tablet (10 mg total) by mouth once daily. 90 tablet 3     famotidine (PEPCID) 20 MG tablet Take 1 tablet (20 mg total) by mouth 2 (two) times daily. 1 tablet 0     finasteride (PROSCAR) 5 mg tablet Take 1 tablet (5 mg total) by mouth once daily. 30 tablet 11     fluocinolone and shower cap (DERMA-SMOOTHE/FS SCALP OIL) 0.01 % Oil Apply oil to damp scalp nightly and cover with shower cap. 118.28 mL 5     glucagon (GVOKE HYPOPEN 1-PACK) 0.5 mg/0.1 mL AtIn Inject 1 Dose into the skin daily as needed (for severe hypoglycemia if you aren't able to treat by ingesting sugar). 1 Syringe 3     HUMALOG KWIKPEN INSULIN 100 unit/mL pen INJECT SUBCUTANEOUSLY 11  UNITS INTO THE SKIN 3 TIMES DAILY BEFORE MEALS PLUS  SLIDING SCALE - MAX TOTAL  DAILY DOSE 63 UNITS 30 mL 6     insulin degludec (TRESIBA FLEXTOUCH U-100) 100 unit/mL (3 mL) insulin pen Inject 40 Units into the skin once daily. 15 pen 3     irbesartan (AVAPRO) 300 MG tablet TAKE 1 TABLET BY MOUTH IN  THE EVENING 90 tablet 3     ketoconazole (NIZORAL) 2 % shampoo Wash hair with medicated shampoo at least 2x/week - let sit on scalp at least 5 minutes prior to rinsing 120 mL 5     labetaloL (NORMODYNE) 300 MG tablet Take 1 tablet (300 mg total) by " mouth 2 (two) times daily. 180 tablet 3     lactulose (CHRONULAC) 20 gram/30 mL Soln Take 30 mLs (20 g total) by mouth 2 (two) times daily as needed (constipation). 1800 mL 2     lancets 31 gauge Misc 1 lancet by Misc.(Non-Drug; Combo Route) route 4 (four) times daily. E11.42 . Prescribed one touch 200 each 6     multivitamin (THERAGRAN) per tablet Take 1 tablet by mouth once daily.       pregabalin (LYRICA) 150 MG capsule Take 1 capsule (150 mg total) by mouth 2 (two) time daily. 60 capsule 11     sodium bicarbonate 325 MG tablet Take 2 tablets (650 mg total) by mouth 2 (two) times daily. 120 tablet 11        Physical Exam:    Vital Signs: There were no vitals filed for this visit.    General Appearance: Well appearing in no acute distress  Eyes:    No scleral icterus  ENT: Neck supple, Lips, mucosa, and tongue normal; teeth and gums normal  Abdomen: Soft, non tender, non distended with normal bowel sounds. No hepatosplenomegaly, ascites, or mass.  Extremities: No edema  Skin: No rash    Labs:  Lab Results   Component Value Date    WBC 6.98 01/05/2023    HGB 9.8 (L) 01/05/2023    HCT 30.8 (L) 01/05/2023     01/05/2023    CHOL 145 01/05/2023    TRIG 154 (H) 01/05/2023    HDL 36 (L) 01/05/2023    ALT 17 01/05/2023    AST 18 01/05/2023     01/05/2023    K 4.5 01/05/2023     01/05/2023    CREATININE 1.7 (H) 01/05/2023    BUN 28 (H) 01/05/2023    CO2 25 01/05/2023    TSH 1.365 09/08/2020    INR 1.0 03/10/2006    HGBA1C 7.7 (H) 01/05/2023       I have explained the risks and benefits of endoscopy procedures to the patient including but not limited to bleeding, perforation, infection, and death.  The patient was asked if they understand and allowed to ask any further questions to their satisfaction.      Jaleel Brito MD   PGY-IV, GI

## 2023-04-26 NOTE — PROVATION PATIENT INSTRUCTIONS
Discharge Summary/Instructions after an Endoscopic Procedure  Patient Name: Annika Mac  Patient MRN: [id number]  Patient YOB: 1950 Wednesday, April 26, 2023  Pa Zamora MD  Dear patient,  As a result of recent federal legislation (The Federal Cures Act), you may   receive lab or pathology results from your procedure in your [facility]   account before your physician is able to contact you. Your physician or   their representative will relay the results to you with their   recommendations at their soonest availability.  Thank you,  RESTRICTIONS:  During your procedure today, you received medications for sedation.  These   medications may affect your judgment, balance and coordination.  Therefore,   for 24 hours, you have the following restrictions:   - DO NOT drive a car, operate machinery, make legal/financial decisions,   sign important papers or drink alcohol.    ACTIVITY:  Today: no heavy lifting, straining or running due to procedural   sedation/anesthesia.  The following day: return to full activity including work.  DIET:  Eat and drink normally unless instructed otherwise.     TREATMENT FOR COMMON SIDE EFFECTS:  - Mild abdominal pain, nausea, belching, bloating or excessive gas:  rest,   eat lightly and use a heating pad.  - Sore Throat: treat with throat lozenges and/or gargle with warm salt   water.  - Because air was used during the procedure, expelling large amounts of air   from your rectum or belching is normal.  - If a bowel prep was taken, you may not have a bowel movement for 1-3 days.    This is normal.  SYMPTOMS TO WATCH FOR AND REPORT TO YOUR PHYSICIAN:  1. Abdominal pain or bloating, other than gas cramps.  2. Chest pain.  3. Back pain.  4. Signs of infection such as: chills or fever occurring within 24 hours   after the procedure.  5. Rectal bleeding, which would show as bright red, maroon, or black stools.   (A tablespoon of blood from the rectum is not serious, especially  if   hemorrhoids are present.)  6. Vomiting.  7. Weakness or dizziness.  GO DIRECTLY TO THE NEAREST EMERGENCY ROOM IF YOU HAVE ANY OF THE FOLLOWING:      Difficulty breathing              Chills and/or fever over 101 F   Persistent vomiting and/or vomiting blood   Severe abdominal pain   Severe chest pain   Black, tarry stools   Bleeding- more than one tablespoon   Any other symptom or condition that you feel may need urgent attention  Your doctor recommends these additional instructions:  If any biopsies were taken, your doctors clinic will contact you in 1 to 2   weeks with any results.  - Discharge patient to home.   - Resume previous diet.   - Return to GI clinic as previously scheduled.  For questions, problems or results please call your physician - Pa Zamora MD at Work:  [phone].  [facility], EMERGENCY ROOM PHONE NUMBER: [phone]  IF A COMPLICATION OR EMERGENCY SITUATION ARISES AND YOU ARE UNABLE TO REACH   YOUR PHYSICIAN - GO DIRECTLY TO THE EMERGENCY ROOM.  aP Zamora MD  4/26/2023 9:55:17 AM  This report has been verified and signed electronically.  Dear patient,  As a result of recent federal legislation (The Federal Cures Act), you may   receive lab or pathology results from your procedure in your [facility]   account before your physician is able to contact you. Your physician or   their representative will relay the results to you with their   recommendations at their soonest availability.  Thank you,  PROVATION

## 2023-04-27 ENCOUNTER — TELEPHONE (OUTPATIENT)
Dept: ENDOCRINOLOGY | Facility: HOSPITAL | Age: 73
End: 2023-04-27
Payer: MEDICARE

## 2023-04-27 ENCOUNTER — PATIENT MESSAGE (OUTPATIENT)
Dept: ENDOCRINOLOGY | Facility: CLINIC | Age: 73
End: 2023-04-27
Payer: MEDICARE

## 2023-04-27 DIAGNOSIS — E11.42 TYPE 2 DIABETES MELLITUS WITH DIABETIC POLYNEUROPATHY, UNSPECIFIED WHETHER LONG TERM INSULIN USE: Primary | ICD-10-CM

## 2023-04-27 DIAGNOSIS — E11.42 TYPE 2 DIABETES MELLITUS WITH DIABETIC POLYNEUROPATHY, WITH LONG-TERM CURRENT USE OF INSULIN: ICD-10-CM

## 2023-04-27 DIAGNOSIS — E11.42 TYPE 2 DIABETES MELLITUS WITH DIABETIC POLYNEUROPATHY, UNSPECIFIED WHETHER LONG TERM INSULIN USE: ICD-10-CM

## 2023-04-27 DIAGNOSIS — Z79.4 TYPE 2 DIABETES MELLITUS WITH DIABETIC POLYNEUROPATHY, WITH LONG-TERM CURRENT USE OF INSULIN: ICD-10-CM

## 2023-04-27 RX ORDER — INSULIN PUMP SYRINGE, 3 ML
1 EACH MISCELLANEOUS DAILY
Qty: 1 EACH | Refills: 0 | Status: SHIPPED | OUTPATIENT
Start: 2023-04-27

## 2023-04-27 RX ORDER — BLOOD-GLUCOSE TRANSMITTER
EACH MISCELLANEOUS
Qty: 1 EACH | Refills: 3 | Status: SHIPPED | OUTPATIENT
Start: 2023-04-27 | End: 2023-05-02

## 2023-04-27 RX ORDER — BLOOD-GLUCOSE SENSOR
EACH MISCELLANEOUS
Qty: 3 EACH | Refills: 11 | Status: SHIPPED | OUTPATIENT
Start: 2023-04-27 | End: 2023-05-02

## 2023-04-27 NOTE — TELEPHONE ENCOUNTER
Pt out of testing supplies. Sending Rx for glucometer and strips. Dexcom paperwork not filed at last visit, requesting it be done again now for coverage

## 2023-04-27 NOTE — TELEPHONE ENCOUNTER
----- Message from Ekta Cole sent at 4/27/2023 10:14 AM CDT -----  Regarding: question  Contact: @  [phone]  Pt called in regards to getting a new machine in order to take her blood sugar the machine she has is not covered by insurance and she needs to know what to do she has not checked her sugar in a few days .....Please call and adv @  [phone]

## 2023-04-27 NOTE — ANESTHESIA POSTPROCEDURE EVALUATION
Anesthesia Post Evaluation    Patient: Annika Mac    Procedure(s) Performed: Procedure(s) (LRB):  EGD (ESOPHAGOGASTRODUODENOSCOPY) (N/A)  COLONOSCOPY (N/A)    Final Anesthesia Type: general      Patient location during evaluation: PACU  Patient participation: Yes- Able to Participate  Level of consciousness: awake  Post-procedure vital signs: reviewed and stable  Pain management: adequate  Airway patency: patent    PONV status at discharge: No PONV  Anesthetic complications: no      Cardiovascular status: blood pressure returned to baseline  Respiratory status: unassisted, spontaneous ventilation and room air            Vitals Value Taken Time   /79 04/26/23 1031   Temp 36.6 °C (97.9 °F) 04/26/23 0925   Pulse 75 04/26/23 1036   Resp 15 04/26/23 1036   SpO2 95 % 04/26/23 1036   Vitals shown include unvalidated device data.      No case tracking events are documented in the log.      Pain/Zechariah Score: No data recorded

## 2023-05-02 ENCOUNTER — TELEPHONE (OUTPATIENT)
Dept: ENDOCRINOLOGY | Facility: CLINIC | Age: 73
End: 2023-05-02
Payer: MEDICARE

## 2023-05-02 DIAGNOSIS — E11.42 TYPE 2 DIABETES MELLITUS WITH DIABETIC POLYNEUROPATHY, UNSPECIFIED WHETHER LONG TERM INSULIN USE: Primary | ICD-10-CM

## 2023-05-03 LAB
FINAL PATHOLOGIC DIAGNOSIS: NORMAL
GROSS: NORMAL
Lab: NORMAL

## 2023-05-04 RX ORDER — FLUOCINOLONE ACETONIDE 0.11 MG/ML
OIL TOPICAL 3 TIMES DAILY
Qty: 120 ML | Refills: 5 | Status: SHIPPED | OUTPATIENT
Start: 2023-05-04 | End: 2023-06-15

## 2023-05-08 ENCOUNTER — TELEPHONE (OUTPATIENT)
Dept: DERMATOLOGY | Facility: CLINIC | Age: 73
End: 2023-05-08
Payer: MEDICARE

## 2023-05-08 NOTE — TELEPHONE ENCOUNTER
----- Message from Brunilda Plummer RN sent at 5/8/2023  2:11 PM CDT -----  Regarding: FW: Recall Appt Mishap  Contact: @[phone]    ----- Message -----  From: Zuleyka Mccormick  Sent: 5/8/2023   1:15 PM CDT  To: Magdy Richards Staff  Subject: Recall Appt Mishap                                Pt is calling for appt, was not able to stay connected to last call- not seeing appt made or ability to make a new appt. Please call and advise in recalls.       [id number] Status: New [10]  Patient: Annika Mac Visit Type: ESTABLISHED PATIENT - DERM [145]  Notification Date: 4/29/2023 Recall Date: 6/28/2023  Expiration Date: 8/27/2023 Letter: BRET RECALL LETTER [[id number]]  Generating Appointment: 6/28/22        [phone] if possible appt June 19th after 10:45am that day after podiatry appt.

## 2023-05-10 ENCOUNTER — TELEPHONE (OUTPATIENT)
Dept: DERMATOLOGY | Facility: CLINIC | Age: 73
End: 2023-05-10
Payer: MEDICARE

## 2023-05-10 NOTE — TELEPHONE ENCOUNTER
I left voice mail stating the purpose of my call is to help her get scheduled for an appointment with Dr. Curran.  I suggested the date of August 26 th, and she should reach out via the portal or calling the clinic's number for assistance.

## 2023-05-16 ENCOUNTER — OFFICE VISIT (OUTPATIENT)
Dept: ORTHOPEDICS | Facility: CLINIC | Age: 73
End: 2023-05-16
Payer: MEDICARE

## 2023-05-16 ENCOUNTER — OFFICE VISIT (OUTPATIENT)
Dept: HEMATOLOGY/ONCOLOGY | Facility: CLINIC | Age: 73
End: 2023-05-16
Payer: MEDICARE

## 2023-05-16 ENCOUNTER — LAB VISIT (OUTPATIENT)
Dept: LAB | Facility: HOSPITAL | Age: 73
End: 2023-05-16
Payer: MEDICARE

## 2023-05-16 VITALS
BODY MASS INDEX: 48.48 KG/M2 | RESPIRATION RATE: 18 BRPM | SYSTOLIC BLOOD PRESSURE: 145 MMHG | WEIGHT: 291 LBS | HEIGHT: 65 IN | DIASTOLIC BLOOD PRESSURE: 80 MMHG | HEART RATE: 78 BPM

## 2023-05-16 DIAGNOSIS — N18.30 ANEMIA IN STAGE 3 CHRONIC KIDNEY DISEASE, UNSPECIFIED WHETHER STAGE 3A OR 3B CKD: ICD-10-CM

## 2023-05-16 DIAGNOSIS — D63.1 ANEMIA IN STAGE 3 CHRONIC KIDNEY DISEASE, UNSPECIFIED WHETHER STAGE 3A OR 3B CKD: Primary | ICD-10-CM

## 2023-05-16 DIAGNOSIS — D63.1 ANEMIA IN STAGE 3 CHRONIC KIDNEY DISEASE, UNSPECIFIED WHETHER STAGE 3A OR 3B CKD: ICD-10-CM

## 2023-05-16 DIAGNOSIS — M17.11 PRIMARY OSTEOARTHRITIS OF RIGHT KNEE: Primary | ICD-10-CM

## 2023-05-16 DIAGNOSIS — N18.30 ANEMIA IN STAGE 3 CHRONIC KIDNEY DISEASE, UNSPECIFIED WHETHER STAGE 3A OR 3B CKD: Primary | ICD-10-CM

## 2023-05-16 LAB
ALBUMIN SERPL BCP-MCNC: 3.6 G/DL (ref 3.5–5.2)
ALP SERPL-CCNC: 114 U/L (ref 55–135)
ALT SERPL W/O P-5'-P-CCNC: 24 U/L (ref 10–44)
ANION GAP SERPL CALC-SCNC: 11 MMOL/L (ref 8–16)
AST SERPL-CCNC: 29 U/L (ref 10–40)
BASOPHILS # BLD AUTO: 0.03 K/UL (ref 0–0.2)
BASOPHILS NFR BLD: 0.5 % (ref 0–1.9)
BILIRUB SERPL-MCNC: 0.3 MG/DL (ref 0.1–1)
BUN SERPL-MCNC: 42 MG/DL (ref 8–23)
CALCIUM SERPL-MCNC: 10.1 MG/DL (ref 8.7–10.5)
CHLORIDE SERPL-SCNC: 107 MMOL/L (ref 95–110)
CO2 SERPL-SCNC: 22 MMOL/L (ref 23–29)
CREAT SERPL-MCNC: 1.6 MG/DL (ref 0.5–1.4)
DIFFERENTIAL METHOD: ABNORMAL
EOSINOPHIL # BLD AUTO: 0.3 K/UL (ref 0–0.5)
EOSINOPHIL NFR BLD: 4.8 % (ref 0–8)
ERYTHROCYTE [DISTWIDTH] IN BLOOD BY AUTOMATED COUNT: 14.7 % (ref 11.5–14.5)
EST. GFR  (NO RACE VARIABLE): 33.8 ML/MIN/1.73 M^2
FERRITIN SERPL-MCNC: 415 NG/ML (ref 20–300)
GLUCOSE SERPL-MCNC: 257 MG/DL (ref 70–110)
HCT VFR BLD AUTO: 31.5 % (ref 37–48.5)
HGB BLD-MCNC: 9.8 G/DL (ref 12–16)
IMM GRANULOCYTES # BLD AUTO: 0.02 K/UL (ref 0–0.04)
IMM GRANULOCYTES NFR BLD AUTO: 0.4 % (ref 0–0.5)
LYMPHOCYTES # BLD AUTO: 1.9 K/UL (ref 1–4.8)
LYMPHOCYTES NFR BLD: 34.6 % (ref 18–48)
MCH RBC QN AUTO: 28.1 PG (ref 27–31)
MCHC RBC AUTO-ENTMCNC: 31.1 G/DL (ref 32–36)
MCV RBC AUTO: 90 FL (ref 82–98)
MONOCYTES # BLD AUTO: 0.6 K/UL (ref 0.3–1)
MONOCYTES NFR BLD: 10 % (ref 4–15)
NEUTROPHILS # BLD AUTO: 2.8 K/UL (ref 1.8–7.7)
NEUTROPHILS NFR BLD: 49.7 % (ref 38–73)
NRBC BLD-RTO: 0 /100 WBC
PLATELET # BLD AUTO: 271 K/UL (ref 150–450)
PMV BLD AUTO: 10.9 FL (ref 9.2–12.9)
POTASSIUM SERPL-SCNC: 4.2 MMOL/L (ref 3.5–5.1)
PROT SERPL-MCNC: 6.4 G/DL (ref 6–8.4)
RBC # BLD AUTO: 3.49 M/UL (ref 4–5.4)
SODIUM SERPL-SCNC: 140 MMOL/L (ref 136–145)
WBC # BLD AUTO: 5.6 K/UL (ref 3.9–12.7)

## 2023-05-16 PROCEDURE — 80053 COMPREHEN METABOLIC PANEL: CPT | Performed by: INTERNAL MEDICINE

## 2023-05-16 PROCEDURE — 20610 PR DRAIN/INJECT LARGE JOINT/BURSA: ICD-10-PCS | Mod: RT,S$GLB,, | Performed by: PHYSICIAN ASSISTANT

## 2023-05-16 PROCEDURE — 4010F PR ACE/ARB THEARPY RXD/TAKEN: ICD-10-PCS | Mod: CPTII,,, | Performed by: INTERNAL MEDICINE

## 2023-05-16 PROCEDURE — 3051F HG A1C>EQUAL 7.0%<8.0%: CPT | Mod: CPTII,,, | Performed by: INTERNAL MEDICINE

## 2023-05-16 PROCEDURE — 3060F PR POS MICROALBUMINURIA RESULT DOCUMENTED/REVIEW: ICD-10-PCS | Mod: CPTII,,, | Performed by: INTERNAL MEDICINE

## 2023-05-16 PROCEDURE — 3066F NEPHROPATHY DOC TX: CPT | Mod: CPTII,,, | Performed by: INTERNAL MEDICINE

## 2023-05-16 PROCEDURE — 99213 OFFICE O/P EST LOW 20 MIN: CPT | Mod: ,,, | Performed by: INTERNAL MEDICINE

## 2023-05-16 PROCEDURE — 4010F ACE/ARB THERAPY RXD/TAKEN: CPT | Mod: CPTII,,, | Performed by: INTERNAL MEDICINE

## 2023-05-16 PROCEDURE — 99999 PR PBB SHADOW E&M-EST. PATIENT-LVL IV: ICD-10-PCS | Mod: PBBFAC,,, | Performed by: PHYSICIAN ASSISTANT

## 2023-05-16 PROCEDURE — 99213 PR OFFICE/OUTPT VISIT, EST, LEVL III, 20-29 MIN: ICD-10-PCS | Mod: ,,, | Performed by: INTERNAL MEDICINE

## 2023-05-16 PROCEDURE — 3066F PR DOCUMENTATION OF TREATMENT FOR NEPHROPATHY: ICD-10-PCS | Mod: CPTII,,, | Performed by: INTERNAL MEDICINE

## 2023-05-16 PROCEDURE — 3051F PR MOST RECENT HEMOGLOBIN A1C LEVEL 7.0 - < 8.0%: ICD-10-PCS | Mod: CPTII,,, | Performed by: INTERNAL MEDICINE

## 2023-05-16 PROCEDURE — 99999 PR PBB SHADOW E&M-EST. PATIENT-LVL IV: CPT | Mod: PBBFAC,,, | Performed by: PHYSICIAN ASSISTANT

## 2023-05-16 PROCEDURE — 36415 COLL VENOUS BLD VENIPUNCTURE: CPT | Performed by: INTERNAL MEDICINE

## 2023-05-16 PROCEDURE — 99499 NO LOS: ICD-10-PCS | Mod: S$GLB,,, | Performed by: PHYSICIAN ASSISTANT

## 2023-05-16 PROCEDURE — 99499 UNLISTED E&M SERVICE: CPT | Mod: S$GLB,,, | Performed by: PHYSICIAN ASSISTANT

## 2023-05-16 PROCEDURE — 85025 COMPLETE CBC W/AUTO DIFF WBC: CPT | Performed by: INTERNAL MEDICINE

## 2023-05-16 PROCEDURE — 82728 ASSAY OF FERRITIN: CPT | Performed by: INTERNAL MEDICINE

## 2023-05-16 PROCEDURE — 20610 DRAIN/INJ JOINT/BURSA W/O US: CPT | Mod: RT,S$GLB,, | Performed by: PHYSICIAN ASSISTANT

## 2023-05-16 PROCEDURE — 3060F POS MICROALBUMINURIA REV: CPT | Mod: CPTII,,, | Performed by: INTERNAL MEDICINE

## 2023-05-16 NOTE — PROGRESS NOTES
Subjective:       Patient ID: Annika Mac is a 72 y.o. female.    Chief Complaint: No chief complaint on file.    Anemia     Mrs. Mac returns today for follow up.  I had last seen her on 12/20/2022.  Briefly, she is a 72 year old morbidly obese female who comes today for follow up for her longstanding anemia.    Multiple stool samples in the past have been negative for occult blood.   Of note, she had an EGD, colonoscopy, and video capsule swallow 4 years ago.  A tubular adenoma was removed from her colon while her EGD had shown patchy mildly erythematous mucosa without bleeding in the gastric body.  Biopsies were negative for malignancy.  The video capsule swallow was unremarkable although it was not an optimal study.    Her CBC today shows a white count of 5,800 per cubic mm, hemoglobin 9.8 grams/deciliter, hematocrit 31.5%, MCV 90, and platelets 271K.  Creatinine is 1.6 mg/dL with a BUN of 42.   FErritin is pending    Several stool samples including three more today have been negative for occult blood    Review of Systems  Overall she feels fair. She again complains of her longstanding arthritic pains involving primarily her knees, her back, and her hips.  She uses a wheelchair for ambulation.  She also again complains of severe constipation.  She denies any anxiety, depression, easy bruising, fevers, chills, night sweats, weight loss, nausea, vomiting, diarrhea, diplopia, blurred vision, epistaxis, hematuria, or headaches.      Objective:      Physical Exam  GENERAL: She is alert, oriented to time, place, person, pleasant, well nourished, in no acute physical distress.  She is able to climb to the examination table with assistance.  VITAL SIGNS: Reviewed.   HEENT: Normal. There are no nasal, oral, lip, gingival, auricular, lid, conjunctival lesions. Pupils are equal, reactive to light and   accommodation and extraocular muscle movements are intact. Mucosae are moist and pink, and there is no thrush.   Maxillary teeth are missing.  NECK: Supple without JVD, adenopathy, or thyromegaly.   LUNGS: Clear to auscultation without wheezing, rales, or rhonchi.   CARDIOVASCULAR: Reveals an S1, S2, no murmurs, no rubs, no gallops.   ABDOMEN: Obese, soft, nontender, without organomegaly. Bowel sounds are present. A median abdominal scar is seen extending from the umbilicus to the symphysis pubis.   EXTREMITIES: No cyanosis or clubbing. There is 1(+) edema at the ankle level bilaterally.  The left foot is wrapped.  SKIN: Does not have petechiae, rashes, induration, or ecchymoses.   NEUROLOGIC: Motor function is 5/5, DTRs are 0 to 1+ bilaterally, symmetrical, and cranial nerves within normal limits.   LYMPHATIC: There is no cervical, axillary, or supraclavicular adenopathy.       Assessment:       1. Anemia in stage 3 chronic kidney disease      2. Morbid obesity due to excess calories     3. 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 Mac.  I suspect that her anemia is multifactorial.  We will check her ferritin; if it is within normal range, I will see her with repeat labs in 6 months.  She will submit 3 more stool samples at the time of her next visit.  Her multiple questions were answered to her satisfaction.    Route Chart for Scheduling    Med Onc Chart Routing      Follow up with physician 6 months.   Follow up with KATIE    Infusion scheduling note    Injection scheduling note    Labs Ferritin, CBC and CMP   Scheduling:  Preferred lab:  Lab interval:     Imaging    Pharmacy appointment    Other referrals           Therapy Plan Information  None

## 2023-05-25 ENCOUNTER — TELEPHONE (OUTPATIENT)
Dept: DERMATOLOGY | Facility: CLINIC | Age: 73
End: 2023-05-25
Payer: MEDICARE

## 2023-05-25 ENCOUNTER — OFFICE VISIT (OUTPATIENT)
Dept: OPTOMETRY | Facility: CLINIC | Age: 73
End: 2023-05-25
Payer: MEDICARE

## 2023-05-25 ENCOUNTER — TELEPHONE (OUTPATIENT)
Dept: ORTHOPEDICS | Facility: CLINIC | Age: 73
End: 2023-05-25
Payer: MEDICARE

## 2023-05-25 DIAGNOSIS — H52.13 MYOPIA WITH ASTIGMATISM AND PRESBYOPIA, BILATERAL: ICD-10-CM

## 2023-05-25 DIAGNOSIS — H52.203 MYOPIA WITH ASTIGMATISM AND PRESBYOPIA, BILATERAL: ICD-10-CM

## 2023-05-25 DIAGNOSIS — H52.4 MYOPIA WITH ASTIGMATISM AND PRESBYOPIA, BILATERAL: ICD-10-CM

## 2023-05-25 DIAGNOSIS — H04.123 DRY EYE SYNDROME OF BOTH EYES: Primary | ICD-10-CM

## 2023-05-25 PROCEDURE — 1126F PR PAIN SEVERITY QUANTIFIED, NO PAIN PRESENT: ICD-10-PCS | Mod: CPTII,S$GLB,, | Performed by: OPTOMETRIST

## 2023-05-25 PROCEDURE — 3051F HG A1C>EQUAL 7.0%<8.0%: CPT | Mod: CPTII,S$GLB,, | Performed by: OPTOMETRIST

## 2023-05-25 PROCEDURE — 3051F PR MOST RECENT HEMOGLOBIN A1C LEVEL 7.0 - < 8.0%: ICD-10-PCS | Mod: CPTII,S$GLB,, | Performed by: OPTOMETRIST

## 2023-05-25 PROCEDURE — 3288F FALL RISK ASSESSMENT DOCD: CPT | Mod: CPTII,S$GLB,, | Performed by: OPTOMETRIST

## 2023-05-25 PROCEDURE — 3060F POS MICROALBUMINURIA REV: CPT | Mod: CPTII,S$GLB,, | Performed by: OPTOMETRIST

## 2023-05-25 PROCEDURE — 99999 PR PBB SHADOW E&M-EST. PATIENT-LVL IV: ICD-10-PCS | Mod: PBBFAC,,, | Performed by: OPTOMETRIST

## 2023-05-25 PROCEDURE — 1160F RVW MEDS BY RX/DR IN RCRD: CPT | Mod: CPTII,S$GLB,, | Performed by: OPTOMETRIST

## 2023-05-25 PROCEDURE — 3066F PR DOCUMENTATION OF TREATMENT FOR NEPHROPATHY: ICD-10-PCS | Mod: CPTII,S$GLB,, | Performed by: OPTOMETRIST

## 2023-05-25 PROCEDURE — 99213 PR OFFICE/OUTPT VISIT, EST, LEVL III, 20-29 MIN: ICD-10-PCS | Mod: S$GLB,,, | Performed by: OPTOMETRIST

## 2023-05-25 PROCEDURE — 1159F MED LIST DOCD IN RCRD: CPT | Mod: CPTII,S$GLB,, | Performed by: OPTOMETRIST

## 2023-05-25 PROCEDURE — 4010F ACE/ARB THERAPY RXD/TAKEN: CPT | Mod: CPTII,S$GLB,, | Performed by: OPTOMETRIST

## 2023-05-25 PROCEDURE — 1160F PR REVIEW ALL MEDS BY PRESCRIBER/CLIN PHARMACIST DOCUMENTED: ICD-10-PCS | Mod: CPTII,S$GLB,, | Performed by: OPTOMETRIST

## 2023-05-25 PROCEDURE — 3066F NEPHROPATHY DOC TX: CPT | Mod: CPTII,S$GLB,, | Performed by: OPTOMETRIST

## 2023-05-25 PROCEDURE — 92015 DETERMINE REFRACTIVE STATE: CPT | Mod: S$GLB,,, | Performed by: OPTOMETRIST

## 2023-05-25 PROCEDURE — 99999 PR PBB SHADOW E&M-EST. PATIENT-LVL IV: CPT | Mod: PBBFAC,,, | Performed by: OPTOMETRIST

## 2023-05-25 PROCEDURE — 4010F PR ACE/ARB THEARPY RXD/TAKEN: ICD-10-PCS | Mod: CPTII,S$GLB,, | Performed by: OPTOMETRIST

## 2023-05-25 PROCEDURE — 1159F PR MEDICATION LIST DOCUMENTED IN MEDICAL RECORD: ICD-10-PCS | Mod: CPTII,S$GLB,, | Performed by: OPTOMETRIST

## 2023-05-25 PROCEDURE — 1101F PT FALLS ASSESS-DOCD LE1/YR: CPT | Mod: CPTII,S$GLB,, | Performed by: OPTOMETRIST

## 2023-05-25 PROCEDURE — 92015 PR REFRACTION: ICD-10-PCS | Mod: S$GLB,,, | Performed by: OPTOMETRIST

## 2023-05-25 PROCEDURE — 3060F PR POS MICROALBUMINURIA RESULT DOCUMENTED/REVIEW: ICD-10-PCS | Mod: CPTII,S$GLB,, | Performed by: OPTOMETRIST

## 2023-05-25 PROCEDURE — 1101F PR PT FALLS ASSESS DOC 0-1 FALLS W/OUT INJ PAST YR: ICD-10-PCS | Mod: CPTII,S$GLB,, | Performed by: OPTOMETRIST

## 2023-05-25 PROCEDURE — 3288F PR FALLS RISK ASSESSMENT DOCUMENTED: ICD-10-PCS | Mod: CPTII,S$GLB,, | Performed by: OPTOMETRIST

## 2023-05-25 PROCEDURE — 1126F AMNT PAIN NOTED NONE PRSNT: CPT | Mod: CPTII,S$GLB,, | Performed by: OPTOMETRIST

## 2023-05-25 PROCEDURE — 99213 OFFICE O/P EST LOW 20 MIN: CPT | Mod: S$GLB,,, | Performed by: OPTOMETRIST

## 2023-05-25 NOTE — TELEPHONE ENCOUNTER
I spoke with Mrs. Mac regarding scheduling an appointment with Dr. Curran. Unfortunately, I had to inform Mrs. Mac Dr. Curran's schedule is full until September.  I suggested logging on to the [facility] portal on June 1 st to schedule an appointment in September.  I offered to schedule her with a different provider to get a closer date, but Mrs. Mac denied.  She expressed her understanding and is going to check the schedule on June 1 st.

## 2023-05-25 NOTE — TELEPHONE ENCOUNTER
----- Message from Zuleyka Mccormick sent at 5/25/2023  2:29 PM CDT -----  Regarding: Urgent for pt Pain worsened after knee injections/ needs advise  Contact: @[phone]  Urgent for pt Pain worsened after knee injections/ needs advise @[phone]     Now having difficulty walking w/o assistance which was not the case before

## 2023-05-25 NOTE — PROGRESS NOTES
HPI    ISABEL: 02/28/2023 Dr. Maldonado   Chief complaint (CC): MRX   Glasses? +  Contacts? -  H/o eye surgery, injections or laser: Cataract surgery OU x 9years ago   H/o eye injury: -  Known eye conditions? -  Family h/o eye conditions? -  Eye gtts? -      (-) Flashes (+)  Floaters (-) Mucous   (+)  Tearing (-) Itching (-) Burning   (-) Headaches (-) Eye Pain/discomfort (-) Irritation   (-)  Redness (+) Double vision occ (-) Blurry vision    Diabetic? +  A1c?      Last edited by Therese Stockton MA on 5/25/2023  9:31 AM.            Assessment /Plan     For exam results, see Encounter Report.      Dry eye syndrome of both eyes  Recommend Systane Ultra or Refresh Optive BID-TID OU to aid with symptoms of dry eyes and intermittent blur/diplopia.    Myopia with astigmatism and presbyopia, bilateral  SRx released to patient. Patient educated on lens options. Cont f/u w/Dr Maldonado.

## 2023-05-25 NOTE — TELEPHONE ENCOUNTER
----- Message from Rosita Davenport LPN sent at 5/9/2023  4:11 PM CDT -----  Regarding: FW: Appt requested  Contact: [phone]    ----- Message -----  From: Ashanti Obrien  Sent: 5/9/2023   4:08 PM CDT  To: [facility] Staff  Subject: Appt requested                                   Pt is calling in to get an appt. Pls call the pt at [phone]

## 2023-06-04 DIAGNOSIS — Z79.4 TYPE 2 DIABETES MELLITUS WITH DIABETIC POLYNEUROPATHY, WITH LONG-TERM CURRENT USE OF INSULIN: ICD-10-CM

## 2023-06-04 DIAGNOSIS — E11.42 TYPE 2 DIABETES MELLITUS WITH DIABETIC POLYNEUROPATHY, WITH LONG-TERM CURRENT USE OF INSULIN: ICD-10-CM

## 2023-06-04 RX ORDER — BLOOD-GLUCOSE SENSOR
EACH MISCELLANEOUS
Qty: 3 EACH | Refills: 11 | Status: SHIPPED | OUTPATIENT
Start: 2023-06-04 | End: 2023-10-30

## 2023-06-04 RX ORDER — BLOOD-GLUCOSE TRANSMITTER
1 EACH MISCELLANEOUS
Qty: 1 EACH | Refills: 3 | Status: SHIPPED | OUTPATIENT
Start: 2023-06-04 | End: 2023-06-04

## 2023-06-04 RX ORDER — BLOOD-GLUCOSE SENSOR
EACH MISCELLANEOUS
Qty: 3 EACH | Refills: 11 | Status: SHIPPED | OUTPATIENT
Start: 2023-06-04 | End: 2023-06-04

## 2023-06-04 RX ORDER — BLOOD-GLUCOSE TRANSMITTER
1 EACH MISCELLANEOUS
Qty: 1 EACH | Refills: 3 | Status: SHIPPED | OUTPATIENT
Start: 2023-06-04 | End: 2024-03-05

## 2023-06-04 RX ORDER — BLOOD-GLUCOSE,RECEIVER,CONT
EACH MISCELLANEOUS
Qty: 1 EACH | Refills: 0 | Status: SHIPPED | OUTPATIENT
Start: 2023-06-04 | End: 2023-06-04

## 2023-06-04 RX ORDER — BLOOD-GLUCOSE,RECEIVER,CONT
EACH MISCELLANEOUS
Qty: 1 EACH | Refills: 0 | Status: SHIPPED | OUTPATIENT
Start: 2023-06-04 | End: 2023-08-30 | Stop reason: SDUPTHER

## 2023-06-04 RX ORDER — INSULIN LISPRO 100 [IU]/ML
INJECTION, SOLUTION INTRAVENOUS; SUBCUTANEOUS
Qty: 30 ML | Refills: 6 | Status: SHIPPED | OUTPATIENT
Start: 2023-06-04 | End: 2023-06-05

## 2023-06-05 DIAGNOSIS — Z79.4 TYPE 2 DIABETES MELLITUS WITH DIABETIC POLYNEUROPATHY, WITH LONG-TERM CURRENT USE OF INSULIN: ICD-10-CM

## 2023-06-05 DIAGNOSIS — E11.42 TYPE 2 DIABETES MELLITUS WITH DIABETIC POLYNEUROPATHY, WITH LONG-TERM CURRENT USE OF INSULIN: ICD-10-CM

## 2023-06-05 RX ORDER — INSULIN LISPRO 100 [IU]/ML
INJECTION, SOLUTION INTRAVENOUS; SUBCUTANEOUS
Qty: 30 ML | Refills: 6 | Status: SHIPPED | OUTPATIENT
Start: 2023-06-05 | End: 2023-10-24 | Stop reason: SDUPTHER
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
## 2023-06-07 ENCOUNTER — OFFICE VISIT (OUTPATIENT)
Dept: INTERNAL MEDICINE | Facility: CLINIC | Age: 73
End: 2023-06-07
Payer: MEDICARE

## 2023-06-07 ENCOUNTER — OFFICE VISIT (OUTPATIENT)
Dept: CARDIOLOGY | Facility: CLINIC | Age: 73
End: 2023-06-07
Payer: MEDICARE

## 2023-06-07 ENCOUNTER — PATIENT OUTREACH (OUTPATIENT)
Dept: ADMINISTRATIVE | Facility: OTHER | Age: 73
End: 2023-06-07
Payer: MEDICARE

## 2023-06-07 VITALS
SYSTOLIC BLOOD PRESSURE: 130 MMHG | HEIGHT: 65 IN | HEART RATE: 68 BPM | BODY MASS INDEX: 48.08 KG/M2 | DIASTOLIC BLOOD PRESSURE: 80 MMHG | WEIGHT: 288.56 LBS | OXYGEN SATURATION: 96 %

## 2023-06-07 VITALS
HEIGHT: 65 IN | DIASTOLIC BLOOD PRESSURE: 84 MMHG | SYSTOLIC BLOOD PRESSURE: 136 MMHG | BODY MASS INDEX: 48.12 KG/M2 | WEIGHT: 288.81 LBS | OXYGEN SATURATION: 99 % | HEART RATE: 76 BPM

## 2023-06-07 DIAGNOSIS — I10 ESSENTIAL HYPERTENSION: ICD-10-CM

## 2023-06-07 DIAGNOSIS — E11.22 TYPE 2 DIABETES MELLITUS WITH STAGE 3B CHRONIC KIDNEY DISEASE AND HYPERTENSION: ICD-10-CM

## 2023-06-07 DIAGNOSIS — E11.69 DYSLIPIDEMIA ASSOCIATED WITH TYPE 2 DIABETES MELLITUS: ICD-10-CM

## 2023-06-07 DIAGNOSIS — M54.50 CHRONIC BILATERAL LOW BACK PAIN WITHOUT SCIATICA: ICD-10-CM

## 2023-06-07 DIAGNOSIS — G89.29 CHRONIC BILATERAL LOW BACK PAIN WITHOUT SCIATICA: ICD-10-CM

## 2023-06-07 DIAGNOSIS — F43.21 SITUATIONAL DEPRESSION: Primary | ICD-10-CM

## 2023-06-07 DIAGNOSIS — E78.5 DYSLIPIDEMIA ASSOCIATED WITH TYPE 2 DIABETES MELLITUS: ICD-10-CM

## 2023-06-07 DIAGNOSIS — M48.061 SPINAL STENOSIS OF LUMBAR REGION, UNSPECIFIED WHETHER NEUROGENIC CLAUDICATION PRESENT: ICD-10-CM

## 2023-06-07 DIAGNOSIS — G62.9 POLYNEUROPATHY: ICD-10-CM

## 2023-06-07 DIAGNOSIS — I13.0 HYPERTENSIVE HEART AND CHRONIC KIDNEY DISEASE WITH HEART FAILURE AND STAGE 1 THROUGH STAGE 4 CHRONIC KIDNEY DISEASE, OR UNSPECIFIED CHRONIC KIDNEY DISEASE: ICD-10-CM

## 2023-06-07 DIAGNOSIS — I12.9 TYPE 2 DIABETES MELLITUS WITH STAGE 3B CHRONIC KIDNEY DISEASE AND HYPERTENSION: ICD-10-CM

## 2023-06-07 DIAGNOSIS — G89.4 CHRONIC PAIN SYNDROME: ICD-10-CM

## 2023-06-07 DIAGNOSIS — Z86.73 HISTORY OF STROKE: ICD-10-CM

## 2023-06-07 DIAGNOSIS — F43.9 STRESS AT HOME: ICD-10-CM

## 2023-06-07 DIAGNOSIS — N18.32 TYPE 2 DIABETES MELLITUS WITH STAGE 3B CHRONIC KIDNEY DISEASE AND HYPERTENSION: ICD-10-CM

## 2023-06-07 DIAGNOSIS — M47.816 LUMBAR FACET ARTHROPATHY: ICD-10-CM

## 2023-06-07 DIAGNOSIS — E66.01 MORBID OBESITY: ICD-10-CM

## 2023-06-07 DIAGNOSIS — G47.33 OSA (OBSTRUCTIVE SLEEP APNEA): ICD-10-CM

## 2023-06-07 PROCEDURE — 1101F PT FALLS ASSESS-DOCD LE1/YR: CPT | Mod: CPTII,GC,S$GLB, | Performed by: INTERNAL MEDICINE

## 2023-06-07 PROCEDURE — 99214 OFFICE O/P EST MOD 30 MIN: CPT | Mod: S$GLB,,, | Performed by: NURSE PRACTITIONER

## 2023-06-07 PROCEDURE — 3051F HG A1C>EQUAL 7.0%<8.0%: CPT | Mod: CPTII,S$GLB,, | Performed by: NURSE PRACTITIONER

## 2023-06-07 PROCEDURE — 99214 PR OFFICE/OUTPT VISIT, EST, LEVL IV, 30-39 MIN: ICD-10-PCS | Mod: GC,S$GLB,, | Performed by: INTERNAL MEDICINE

## 2023-06-07 PROCEDURE — 1101F PR PT FALLS ASSESS DOC 0-1 FALLS W/OUT INJ PAST YR: ICD-10-PCS | Mod: CPTII,GC,S$GLB, | Performed by: INTERNAL MEDICINE

## 2023-06-07 PROCEDURE — 3288F FALL RISK ASSESSMENT DOCD: CPT | Mod: CPTII,GC,S$GLB, | Performed by: INTERNAL MEDICINE

## 2023-06-07 PROCEDURE — 1159F PR MEDICATION LIST DOCUMENTED IN MEDICAL RECORD: ICD-10-PCS | Mod: CPTII,GC,S$GLB, | Performed by: INTERNAL MEDICINE

## 2023-06-07 PROCEDURE — 3075F PR MOST RECENT SYSTOLIC BLOOD PRESS GE 130-139MM HG: ICD-10-PCS | Mod: CPTII,S$GLB,, | Performed by: NURSE PRACTITIONER

## 2023-06-07 PROCEDURE — 3060F PR POS MICROALBUMINURIA RESULT DOCUMENTED/REVIEW: ICD-10-PCS | Mod: CPTII,GC,S$GLB, | Performed by: INTERNAL MEDICINE

## 2023-06-07 PROCEDURE — 3066F PR DOCUMENTATION OF TREATMENT FOR NEPHROPATHY: ICD-10-PCS | Mod: CPTII,GC,S$GLB, | Performed by: INTERNAL MEDICINE

## 2023-06-07 PROCEDURE — 3066F NEPHROPATHY DOC TX: CPT | Mod: CPTII,S$GLB,, | Performed by: NURSE PRACTITIONER

## 2023-06-07 PROCEDURE — 3051F HG A1C>EQUAL 7.0%<8.0%: CPT | Mod: CPTII,GC,S$GLB, | Performed by: INTERNAL MEDICINE

## 2023-06-07 PROCEDURE — 3079F DIAST BP 80-89 MM HG: CPT | Mod: CPTII,S$GLB,, | Performed by: NURSE PRACTITIONER

## 2023-06-07 PROCEDURE — 3075F SYST BP GE 130 - 139MM HG: CPT | Mod: CPTII,GC,S$GLB, | Performed by: INTERNAL MEDICINE

## 2023-06-07 PROCEDURE — 3079F PR MOST RECENT DIASTOLIC BLOOD PRESSURE 80-89 MM HG: ICD-10-PCS | Mod: CPTII,GC,S$GLB, | Performed by: INTERNAL MEDICINE

## 2023-06-07 PROCEDURE — 3288F PR FALLS RISK ASSESSMENT DOCUMENTED: ICD-10-PCS | Mod: CPTII,GC,S$GLB, | Performed by: INTERNAL MEDICINE

## 2023-06-07 PROCEDURE — 99999 PR PBB SHADOW E&M-EST. PATIENT-LVL V: CPT | Mod: PBBFAC,GC,, | Performed by: INTERNAL MEDICINE

## 2023-06-07 PROCEDURE — 4010F ACE/ARB THERAPY RXD/TAKEN: CPT | Mod: CPTII,GC,S$GLB, | Performed by: INTERNAL MEDICINE

## 2023-06-07 PROCEDURE — 3008F PR BODY MASS INDEX (BMI) DOCUMENTED: ICD-10-PCS | Mod: CPTII,S$GLB,, | Performed by: NURSE PRACTITIONER

## 2023-06-07 PROCEDURE — 4010F PR ACE/ARB THEARPY RXD/TAKEN: ICD-10-PCS | Mod: CPTII,S$GLB,, | Performed by: NURSE PRACTITIONER

## 2023-06-07 PROCEDURE — 99214 PR OFFICE/OUTPT VISIT, EST, LEVL IV, 30-39 MIN: ICD-10-PCS | Mod: S$GLB,,, | Performed by: NURSE PRACTITIONER

## 2023-06-07 PROCEDURE — 3079F PR MOST RECENT DIASTOLIC BLOOD PRESSURE 80-89 MM HG: ICD-10-PCS | Mod: CPTII,S$GLB,, | Performed by: NURSE PRACTITIONER

## 2023-06-07 PROCEDURE — 3060F PR POS MICROALBUMINURIA RESULT DOCUMENTED/REVIEW: ICD-10-PCS | Mod: CPTII,S$GLB,, | Performed by: NURSE PRACTITIONER

## 2023-06-07 PROCEDURE — 4010F ACE/ARB THERAPY RXD/TAKEN: CPT | Mod: CPTII,S$GLB,, | Performed by: NURSE PRACTITIONER

## 2023-06-07 PROCEDURE — 3066F NEPHROPATHY DOC TX: CPT | Mod: CPTII,GC,S$GLB, | Performed by: INTERNAL MEDICINE

## 2023-06-07 PROCEDURE — 3008F BODY MASS INDEX DOCD: CPT | Mod: CPTII,S$GLB,, | Performed by: NURSE PRACTITIONER

## 2023-06-07 PROCEDURE — 99999 PR PBB SHADOW E&M-EST. PATIENT-LVL V: CPT | Mod: PBBFAC,,, | Performed by: NURSE PRACTITIONER

## 2023-06-07 PROCEDURE — 99214 OFFICE O/P EST MOD 30 MIN: CPT | Mod: GC,S$GLB,, | Performed by: INTERNAL MEDICINE

## 2023-06-07 PROCEDURE — 3008F PR BODY MASS INDEX (BMI) DOCUMENTED: ICD-10-PCS | Mod: CPTII,GC,S$GLB, | Performed by: INTERNAL MEDICINE

## 2023-06-07 PROCEDURE — 3051F PR MOST RECENT HEMOGLOBIN A1C LEVEL 7.0 - < 8.0%: ICD-10-PCS | Mod: CPTII,GC,S$GLB, | Performed by: INTERNAL MEDICINE

## 2023-06-07 PROCEDURE — 1125F PR PAIN SEVERITY QUANTIFIED, PAIN PRESENT: ICD-10-PCS | Mod: CPTII,S$GLB,, | Performed by: NURSE PRACTITIONER

## 2023-06-07 PROCEDURE — 99999 PR PBB SHADOW E&M-EST. PATIENT-LVL V: ICD-10-PCS | Mod: PBBFAC,,, | Performed by: NURSE PRACTITIONER

## 2023-06-07 PROCEDURE — 3051F PR MOST RECENT HEMOGLOBIN A1C LEVEL 7.0 - < 8.0%: ICD-10-PCS | Mod: CPTII,S$GLB,, | Performed by: NURSE PRACTITIONER

## 2023-06-07 PROCEDURE — 99999 PR PBB SHADOW E&M-EST. PATIENT-LVL V: ICD-10-PCS | Mod: PBBFAC,GC,, | Performed by: INTERNAL MEDICINE

## 2023-06-07 PROCEDURE — 1101F PR PT FALLS ASSESS DOC 0-1 FALLS W/OUT INJ PAST YR: ICD-10-PCS | Mod: CPTII,S$GLB,, | Performed by: NURSE PRACTITIONER

## 2023-06-07 PROCEDURE — 3060F POS MICROALBUMINURIA REV: CPT | Mod: CPTII,GC,S$GLB, | Performed by: INTERNAL MEDICINE

## 2023-06-07 PROCEDURE — 1126F PR PAIN SEVERITY QUANTIFIED, NO PAIN PRESENT: ICD-10-PCS | Mod: CPTII,GC,S$GLB, | Performed by: INTERNAL MEDICINE

## 2023-06-07 PROCEDURE — 1125F AMNT PAIN NOTED PAIN PRSNT: CPT | Mod: CPTII,S$GLB,, | Performed by: NURSE PRACTITIONER

## 2023-06-07 PROCEDURE — 3060F POS MICROALBUMINURIA REV: CPT | Mod: CPTII,S$GLB,, | Performed by: NURSE PRACTITIONER

## 2023-06-07 PROCEDURE — 3075F SYST BP GE 130 - 139MM HG: CPT | Mod: CPTII,S$GLB,, | Performed by: NURSE PRACTITIONER

## 2023-06-07 PROCEDURE — 1126F AMNT PAIN NOTED NONE PRSNT: CPT | Mod: CPTII,GC,S$GLB, | Performed by: INTERNAL MEDICINE

## 2023-06-07 PROCEDURE — 1101F PT FALLS ASSESS-DOCD LE1/YR: CPT | Mod: CPTII,S$GLB,, | Performed by: NURSE PRACTITIONER

## 2023-06-07 PROCEDURE — 3079F DIAST BP 80-89 MM HG: CPT | Mod: CPTII,GC,S$GLB, | Performed by: INTERNAL MEDICINE

## 2023-06-07 PROCEDURE — 3075F PR MOST RECENT SYSTOLIC BLOOD PRESS GE 130-139MM HG: ICD-10-PCS | Mod: CPTII,GC,S$GLB, | Performed by: INTERNAL MEDICINE

## 2023-06-07 PROCEDURE — 3066F PR DOCUMENTATION OF TREATMENT FOR NEPHROPATHY: ICD-10-PCS | Mod: CPTII,S$GLB,, | Performed by: NURSE PRACTITIONER

## 2023-06-07 PROCEDURE — 4010F PR ACE/ARB THEARPY RXD/TAKEN: ICD-10-PCS | Mod: CPTII,GC,S$GLB, | Performed by: INTERNAL MEDICINE

## 2023-06-07 PROCEDURE — 3288F FALL RISK ASSESSMENT DOCD: CPT | Mod: CPTII,S$GLB,, | Performed by: NURSE PRACTITIONER

## 2023-06-07 PROCEDURE — 3288F PR FALLS RISK ASSESSMENT DOCUMENTED: ICD-10-PCS | Mod: CPTII,S$GLB,, | Performed by: NURSE PRACTITIONER

## 2023-06-07 PROCEDURE — 3008F BODY MASS INDEX DOCD: CPT | Mod: CPTII,GC,S$GLB, | Performed by: INTERNAL MEDICINE

## 2023-06-07 PROCEDURE — 1159F MED LIST DOCD IN RCRD: CPT | Mod: CPTII,GC,S$GLB, | Performed by: INTERNAL MEDICINE

## 2023-06-07 NOTE — ASSESSMENT & PLAN NOTE
"Hypertension:  -BP today: /80   Pulse 68   Ht 5' 5" (1.651 m)   Wt 130.9 kg (288 lb 9.3 oz)   SpO2 96%   BMI 48.02 kg/m²   -HTN well controlled, pt advised to continue current management of irbesartan, chlorthalidone, amlodipine, and labetalol     -Pt advised to be compliant with their treatment regimen daily to avoid BP fluctuation and any complications.   -Pt advised to monitor their blood pressure regularly and bring their recorded results to next office visit.  -Pt educated that it is important to eat right. Following a reasonable-calorie low-salt diet has been shown to control blood pressure better with less medications. Recommended to exercise at least 30 mins a day for 5 days a week and also told to avoid smoking all together.      "

## 2023-06-07 NOTE — PROGRESS NOTES
MD Kimo Alvarez MD in [address] on 3/4/2022  DOBUTAMINE, ECHO in [facility] - Echo / Stress Lab on 3/14/2022  Peter Hodges MD in [address] on 6/17/2022  Peter Hodges MD in [address] on 11/30/2022  Peter Hodges MD in [address] on 6/7/2023    Subjective:   Patient ID:  Annika Mac is a 72 y.o. female who presents for follow-up of Follow-up    Annika Mac is a 72 y.o. y.o. female with a obesity, CVA in 2003, chronic low back pain, RUFINA on CPAP intermittently, diabetes mellitus, hypertension, hyperlipidemia, and remote history of DVT after left knee replacement. She has previously been seen for dizziness upon standing, as well as sitting up after leaning forward however denies any syncope. She is not very active, due to chronic pain including her back, hip and knee. She has seen nephrology in the past who ordered a renal US, which was negative.  Patient also complains of lower extremity edema bilaterally which he states is chronic and unchanged.       She was previously assessed for MCFADDEN and SOB after her last visit with Cardiology. A DSE was ordered and negative. She has had difficult to control blood pressure, but it is currently well controlled on the following 4 agents: amlodipine 10 mg daily, irbesartan 300 mg daily labetalol 300 mg b.i.d. and chlorthalidone 25 mg daily. At her prior visit in June 2022 orthostatics performed in clinic: 128/54 HR 74 lying down (dizzy), 122/60 HR 74 standing (fatigued), and 128/50 HR 72 (dizzy). Her dizziness was attributed to deconditioning. Dizziness had improved on subsequent visits.    Interval history:    Dizziness still improved for the most part. She did have one fall after feeling light headed after standing up too fast on mother's day. Denies any major injury. No complaints today. Denies chest pain, shortness of breath, palpitations.     Diet/Salt intake: cooks  own, reports low salt. Reports some canned food use but rinses it prior   Exercise: completed physical therapy. She does sitting exercises and mild resistance training for about 10 minutes/day to avoid stiffness. She's trying to walk more with her walker. She's limited by her right knee.    BP monitoring at home: reports SBP of 120s/60s-70s     Tobacco: denies, remote hx of tobacco   Alcohol: denies, rare use on holidays  Illicit Drug use: denies  Medication compliance: strict     Medical:   Surgical: Reviewed, as below.  Family: Reviewed, as below.  Social: Reviewed, as below.     ROS  Negative aside from what is mentioned in HPI above.    Past Medical History:   Diagnosis Date    Asthma     Chronic obstructive pulmonary disease, unspecified COPD type 2022    Constipation 10/3/2019    Deep vein thrombophlebitis of left leg 2012    Deep vein thrombosis     when she had a knee replacement    Diabetic foot ulcer with osteomyelitis     Encounter for blood transfusion     anemic     GERD (gastroesophageal reflux disease)     Obesity, diabetes, and hypertension syndrome 2019    Obstructive sleep apnea 2012    PAD (peripheral artery disease) 2013    Pressure ulcer of toe of left foot     Type 2 diabetes mellitus with obesity 2020    Type 2 diabetes mellitus with peripheral artery disease 2020       Past Surgical History:   Procedure Laterality Date    CATARACT EXTRACTION W/  INTRAOCULAR LENS IMPLANT Left 3/2002    left     CATARACT EXTRACTION W/  INTRAOCULAR LENS IMPLANT Right     OD dr. olivares     SECTION      COLONOSCOPY N/A 2018    Procedure: COLONOSCOPY;  Surgeon: Faizan Mac MD;  Location: [facility] ([address] [place] [address]);  Service: Endoscopy;  Laterality: N/A;    COLONOSCOPY N/A 2023    Procedure: COLONOSCOPY;  Surgeon: Pa Zamora MD;  [facility]  Service: Endoscopy;  Laterality: N/A;    CYST REMOVAL  2011    left side of  face    CYSTOSCOPY W/ RETROGRADES Left 2/26/2020    Procedure: CYSTOSCOPY, WITH RETROGRADE PYELOGRAM;  Surgeon: Noman Rivas MD;  Location: [facility] 1ST FLR;  Service: Urology;  Laterality: Left;  30min    DE QUERVAIN'S RELEASE  1/8/2021    Procedure: RELEASE, HAND, FOR DEQUERVAIN'S TENOSYNOVITIS;  Surgeon: Marky Hagen MD;  Location: [facility];  Service: Orthopedics;;    ESOPHAGOGASTRODUODENOSCOPY N/A 4/26/2023    Procedure: EGD (ESOPHAGOGASTRODUODENOSCOPY);  Surgeon: Pa Zamora MD;  Location: [facility] ENDO (2ND FLR);  Service: Endoscopy;  Laterality: N/A;  suprep-[facility] mail-bmi 49.09-tb  4/20/23- No answer for precall- [place]    EYE SURGERY Bilateral 2012    eye implants    GANGLION CYST EXCISION  11/13/2007    Right wrist    INJECTION OF ANESTHETIC AGENT AROUND MEDIAL BRANCH NERVES INNERVATING LUMBAR FACET JOINT Bilateral 4/5/2022    Procedure: Block-nerve-medial branch-lumbar bilateral L4-5 and L5-S1;  Surgeon: Alireza Meraz Jr., MD;  Location: [facility] PAIN MGT;  Service: Pain Management;  Laterality: Bilateral;  pt is diabetic   asa    INJECTION OF ANESTHETIC AGENT AROUND MEDIAL BRANCH NERVES INNERVATING LUMBAR FACET JOINT Bilateral 4/19/2022    Procedure: Block-nerve-medial branch-lumbar bilaterl L4-5 and L5-S1;  Surgeon: Alireza Meraz Jr., MD;  Location: [facility] PAIN MGT;  Service: Pain Management;  Laterality: Bilateral;  pt is diabetic     INJECTION OF FACET JOINT Bilateral 5/6/2021    Procedure: INJECTION, FACET JOINT--Bilateral L4-5, L5-S1;  Surgeon: Alireza Meraz Jr., MD;  Location: [facility] PAIN MGT;  Service: Pain Management;  Laterality: Bilateral;  Oral    INTERPOSITION ARTHROPLASTY OF CARPOMETACARPAL JOINTS Left 1/8/2021    Procedure: INTERPOSITION ARTHROPLASTY, CMC JOINT - left dequervains and cmc arthroplasty, arthrex;  Surgeon: Marky Hagen MD;  Location: [facility];  Service: Orthopedics;  Laterality: Left;    JOINT REPLACEMENT Left     Pan Retinal Photocoagulation Bilateral     Dr. Monterroso  "(Proliferative Diabetic Retinopathy)    RADIOFREQUENCY THERMOCOAGULATION Right 5/12/2022    Procedure: RADIOFREQUENCY THERMAL COAGULATION RIGHT L4-5, L5-S1 (IV Sedation);  Surgeon: Alireza Meraz Jr., MD;  Location: [facility] PAIN MGT;  Service: Pain Management;  Laterality: Right;  diabetic    RADIOFREQUENCY THERMOCOAGULATION Left 5/19/2022    Procedure: RADIOFREQUENCY THERMAL COAGULATION LEFT L4-5, L5-S1 (IV Sedation);  Surgeon: Alireza Meraz Jr., MD;  Location: [facility] PAIN T;  Service: Pain Management;  Laterality: Left;  diabetic    TOTAL ABDOMINAL HYSTERECTOMY  1982    TOTAL KNEE ARTHROPLASTY  2/2006    left    TRIGGER FINGER RELEASE  9/18/2009       Family History   Problem Relation Age of Onset    Diabetes Mother     Hypertension Mother     Heart disease Father     Diabetes Sister     No Known Problems Sister     No Known Problems Brother     Thyroid disease Brother     Diabetes Brother     Hypertension Brother     No Known Problems Daughter     No Known Problems Daughter     No Known Problems Son     Amblyopia Neg Hx     Blindness Neg Hx     Glaucoma Neg Hx     Macular degeneration Neg Hx     Retinal detachment Neg Hx     Strabismus Neg Hx     Colon cancer Neg Hx     Esophageal cancer Neg Hx          Current Outpatient Medications:     albuterol (PROAIR HFA) 90 mcg/actuation inhaler, Inhale 2 puffs into the lungs every 6 (six) hours as needed for Wheezing. Rescue, Disp: 18 g, Rfl: 6    allopurinoL (ZYLOPRIM) 300 MG tablet, Take 1 tablet (300 mg total) by mouth once daily., Disp: 90 tablet, Rfl: 4    amLODIPine (NORVASC) 10 MG tablet, Take 1 tablet (10 mg total) by mouth once daily., Disp: 90 tablet, Rfl: 0    aspirin 81 MG Chew, Take 1 tablet (81 mg total) by mouth once daily., Disp: , Rfl: 0    atorvastatin (LIPITOR) 20 MG tablet, Take 1 tablet (20 mg total) by mouth once daily., Disp: 90 tablet, Rfl: 3    BD ULTRA-FINE KIKA PEN NEEDLE 32 gauge x 5/32" Ndle, To use 4 times daily, Disp: 200 each, Rfl: 20   "  blood sugar diagnostic Strp, 1 strip 4 times daily. Contour Next., Disp: 200 strip, Rfl: 11    blood-glucose meter kit, 1 each by Other route once daily. Use daily. Insurance proffered one touch  E11.42, Disp: 1 each, Rfl: 0    blood-glucose meter,continuous (DEXCOM G6 ) Misc, 1 device to monitor blood glucose with dexcom, Disp: 1 each, Rfl: 0    CONSTULOSE 10 gram/15 mL solution, SMARTSI Milliliter(s) By Mouth Twice Daily PRN, Disp: , Rfl:     dulaglutide (TRULICITY) 4.5 mg/0.5 mL pen injector, Inject 4.5 mg into the skin every 7 days., Disp: 12 pen, Rfl: 3    DULoxetine (CYMBALTA) 30 MG capsule, TAKE 1 CAPSULE BY MOUTH  ONCE DAILY, Disp: 90 capsule, Rfl: 3    empagliflozin (JARDIANCE) 10 mg tablet, Take 1 tablet (10 mg total) by mouth once daily., Disp: 90 tablet, Rfl: 3    famotidine (PEPCID) 20 MG tablet, Take 1 tablet (20 mg total) by mouth 2 (two) times daily., Disp: 1 tablet, Rfl: 0    finasteride (PROSCAR) 5 mg tablet, Take 1 tablet (5 mg total) by mouth once daily., Disp: 30 tablet, Rfl: 11    glucagon (GVOKE HYPOPEN 1-PACK) 0.5 mg/0.1 mL AtIn, Inject 1 Dose into the skin daily as needed (for severe hypoglycemia if you aren't able to treat by ingesting sugar)., Disp: 1 Syringe, Rfl: 3    HUMALOG KWIKPEN INSULIN 100 unit/mL pen, INJECT 11 UNITS INTO THE  SKIN 3 TIMES DAILY BEFORE  MEALS PLUS SLIDING SCALE -  MAX TOTAL DAILY DOSE 63  UNITS, Disp: 30 mL, Rfl: 6    insulin degludec (TRESIBA FLEXTOUCH U-100) 100 unit/mL (3 mL) insulin pen, Inject 40 Units into the skin once daily., Disp: 15 pen, Rfl: 3    irbesartan (AVAPRO) 300 MG tablet, TAKE 1 TABLET BY MOUTH IN  THE EVENING, Disp: 90 tablet, Rfl: 3    ketoconazole (NIZORAL) 2 % shampoo, Wash hair with medicated shampoo at least 2x/week - let sit on scalp at least 5 minutes prior to rinsing, Disp: 120 mL, Rfl: 5    labetaloL (NORMODYNE) 300 MG tablet, Take 1 tablet (300 mg total) by mouth 2 (two) times daily., Disp: 180 tablet, Rfl: 3     lactulose (CHRONULAC) 20 gram/30 mL Soln, Take 30 mLs (20 g total) by mouth 2 (two) times daily as needed (constipation)., Disp: 1800 mL, Rfl: 2    lancets 31 gauge Misc, 1 lancet by Misc.(Non-Drug; Combo Route) route 4 (four) times daily. E11.42 . Prescribed one touch, Disp: 200 each, Rfl: 6    multivitamin (THERAGRAN) per tablet, Take 1 tablet by mouth once daily., Disp: , Rfl:     pregabalin (LYRICA) 150 MG capsule, Take 1 capsule (150 mg total) by mouth 2 (two) time daily., Disp: 60 capsule, Rfl: 11    blood-glucose sensor (DEXCOM G6 SENSOR) Terese, Use 1 device, exchange every 10 days (Patient not taking: Reported on 6/7/2023), Disp: 3 each, Rfl: 11    blood-glucose sensor Terese, Affix 1 device to skin every 10 days (Patient not taking: Reported on 6/7/2023), Disp: 3 each, Rfl: 11    blood-glucose transmitter (DEXCOM G6 TRANSMITTER) Terese, 1 Device by Misc.(Non-Drug; Combo Route) route every 3 (three) months. (Patient not taking: Reported on 6/7/2023), Disp: 1 each, Rfl: 3    chlorthalidone (HYGROTEN) 25 MG Tab, Take 1 tablet (25 mg total) by mouth once daily. (Patient not taking: Reported on 6/7/2023), Disp: 30 tablet, Rfl: 1    fluocinolone (DERMA-SMOOTHE/FS BODY OIL) 0.01 % external oil, Apply topically 3 (three) times daily. (Patient not taking: Reported on 6/7/2023), Disp: 120 mL, Rfl: 5    fluocinolone and shower cap (DERMA-SMOOTHE/FS SCALP OIL) 0.01 % Oil, Apply oil to damp scalp nightly and cover with shower cap. (Patient not taking: Reported on 6/7/2023), Disp: 118.28 mL, Rfl: 5    sodium bicarbonate 325 MG tablet, Take 2 tablets (650 mg total) by mouth 2 (two) times daily., Disp: 120 tablet, Rfl: 11    Current Facility-Administered Medications:     hyaluronate sodium, stabilized (DUROLANE) 60 mg/3 mL 60 mg, 60 mg, Intra-articular, 1 time in Clinic/HOD, Homero Leggett PA-C    triamcinolone acetonide injection 10 mg, 10 mg, Intradermal, Once, Susie Curran MD    triamcinolone acetonide injection  "10 mg, 10 mg, Intradermal, Once, Susie Curran MD    Facility-Administered Medications Ordered in Other Visits:     fentaNYL injection 25 mcg, 25 mcg, Intravenous, Q5 Min PRN, Desmond Fontana MD, 50 mcg at 01/08/21 0955    midazolam (VERSED) 1 mg/mL injection 0.5 mg, 0.5 mg, Intravenous, PRN, Desmond Fontana MD, 2 mg at 01/08/21 0955  Objective:   /80   Pulse 68   Ht 5' 5" (1.651 m)   Wt 130.9 kg (288 lb 9.3 oz)   SpO2 96%   BMI 48.02 kg/m²      Physical Exam  Constitutional:       Appearance: She is well-developed.   HENT:      Head: Normocephalic and atraumatic.      Right Ear: External ear normal.      Left Ear: External ear normal.   Eyes:      Conjunctiva/sclera: Conjunctivae normal.   Cardiovascular:      Rate and Rhythm: Normal rate and regular rhythm.      Pulses: Intact distal pulses.           Radial pulses are 2+ on the right side and 2+ on the left side.      Heart sounds: Murmur (systolic murmur consistent with aortic sclerosis) heard.   Pulmonary:      Effort: Pulmonary effort is normal. No respiratory distress.      Breath sounds: Normal breath sounds. No wheezing.   Abdominal:      General: Bowel sounds are normal. There is no distension.      Palpations: Abdomen is soft.      Tenderness: There is no abdominal tenderness.   Musculoskeletal:         General: Swelling (bilateral 1+ edema. reportedly chronic) present. Normal range of motion.      Cervical back: Normal range of motion and neck supple.   Skin:     General: Skin is warm and dry.   Neurological:      Mental Status: She is alert and oriented to person, place, and time.   Psychiatric:         Mood and Affect: Mood normal.         Behavior: Behavior normal.     Lab Results   Component Value Date     05/16/2023    K 4.2 05/16/2023     05/16/2023    CO2 22 (L) 05/16/2023    BUN 42 (H) 05/16/2023    CREATININE 1.6 (H) 05/16/2023    ANIONGAP 11 05/16/2023     Lab Results   Component Value Date    " "HGBA1C 7.7 (H) 01/05/2023     No results found for: BNP, BNPTRIAGEBLO    Lab Results   Component Value Date    WBC 5.60 05/16/2023    HGB 9.8 (L) 05/16/2023    HCT 31.5 (L) 05/16/2023     05/16/2023    GRAN 2.8 05/16/2023    GRAN 49.7 05/16/2023     Lab Results   Component Value Date    CHOL 145 01/05/2023    HDL 36 (L) 01/05/2023    LDLCALC 78.2 01/05/2023    TRIG 154 (H) 01/05/2023        Assessment:     1. History of stroke    2. Dyslipidemia associated with type 2 diabetes mellitus    3. Essential hypertension    4. Morbid obesity    5. Type 2 diabetes mellitus with stage 3b chronic kidney disease and hypertension    6. RUFINA (obstructive sleep apnea)        Plan:   History of stroke  Continue on aspirin and lipitor    Dyslipidemia associated with type 2 diabetes mellitus  Continue lipitor    Essential hypertension  Hypertension:  -BP today: /80   Pulse 68   Ht 5' 5" (1.651 m)   Wt 130.9 kg (288 lb 9.3 oz)   SpO2 96%   BMI 48.02 kg/m²   -HTN well controlled, pt advised to continue current management of irbesartan, chlorthalidone, amlodipine, and labetalol     -Pt advised to be compliant with their treatment regimen daily to avoid BP fluctuation and any complications.   -Pt advised to monitor their blood pressure regularly and bring their recorded results to next office visit.  -Pt educated that it is important to eat right. Following a reasonable-calorie low-salt diet has been shown to control blood pressure better with less medications. Recommended to exercise at least 30 mins a day for 5 days a week and also told to avoid smoking all together.        Morbid obesity  Encouraged diet and exercise as tolerated.  Recommended to continue and advance her exercise regimen     Type 2 diabetes mellitus with stage 3b chronic kidney disease and hypertension  Management per PCP and endo  Increases cardiovasc risk profile.    RUFINA (obstructive sleep apnea)  Reports upcoming appt with sleep clinic when asked " about compliance      Patient discussed with attending Cardiologist, Dr. Frederick, who agrees with management and plan.    Peter Hodges MD  Cardiovascular Disease Fellow PGY VI  Pager [phone]

## 2023-06-07 NOTE — Clinical Note
Dr. Cotton, can you possibly get her in with you on your next timely availability? She is having some home issues (notes are detailed), emotionally upset today, think we have a starting plan, but also think she should have a visit with you, and I'm happy to keep her in the loop alongside you if you need.   Best,  Ninoska Mackenzie

## 2023-06-07 NOTE — ASSESSMENT & PLAN NOTE
Encouraged diet and exercise as tolerated.  Recommended to continue and advance her exercise regimen

## 2023-06-07 NOTE — PROGRESS NOTES
Community Health Worker contacted Pt at 12:18 pm conversation was brief pt was on her way back  home and requested CHW call back tomorrow around 11 am -12 pm.    Follow-up Outreach - Due: 6/8/2023

## 2023-06-08 ENCOUNTER — PATIENT MESSAGE (OUTPATIENT)
Dept: BEHAVIORAL HEALTH | Facility: CLINIC | Age: 73
End: 2023-06-08
Payer: MEDICARE

## 2023-06-08 ENCOUNTER — TELEPHONE (OUTPATIENT)
Dept: BEHAVIORAL HEALTH | Facility: CLINIC | Age: 73
End: 2023-06-08
Payer: MEDICARE

## 2023-06-08 NOTE — PROGRESS NOTES
"CHW - Initial Contact    This Community Health Worker verified the Social Determinant of Health questionnaire with patient via telephone today.      Pt identified barriers of most importance are: Housing: Pt stated she wants to move out of the home she shares with her  because he's not the same person anymore and she would feel more comfortable moving into her own place but just didn't know where to start.    CHW did ask Pt if she was in immediate danger while staying in the shared home of  Pt stated "no" but wants to move away from him and entirely.    Pt stated she has a fixed monthly income of about $1,033 so her budget for housing would be around $500-600 most apartments/ rental homes require tenants make two or three times the rent.    Pt did stated she would like to move somewhere in [facility] like [facility] or [facility].    Referrals were put through [facility] - No    Support and Services: using [email or web address]  CHW called     [facility]- Left VM - At 9:02 am on 6/12/23 Rep for property left message stating there isn't any vacancies at this time all 32 units are full but Pt can fill out an application if she would like    2. [facility] [address]- Left VM    3. [facility]- Not income based and required three times the rent    4. [facility] apartments- Left VM    5. [facility] [address]-Left VM    6.[facility]-Left VM    Other information discussed the patient needs / wants help with: n/a    Follow up required: yes    Follow-up Outreach - Due: 6/9/2023     "

## 2023-06-08 NOTE — PROGRESS NOTES
Patient declined to be in the program due to transportation and technology issues. Patient was informed should she change her mind to contact her PCP for another referral. Patient agreed to take outside counseling resources. CHW mailed patient the resources today. Referral canceled and PCP notified per epic

## 2023-06-12 NOTE — PROGRESS NOTES
Annika Mac is a 72 y.o. year old her here today for her duralane injection for degenerative changes of her right knee . she was last seen and treated in the clinic on Visit date not found. There has been no significant change in her medical status since her last visit. No Fever, chills, malaise, or unexplained weight change.      Allergies, Medications, past medical and surgical history were reviewed .    Examination of the knee demonstrates  No evidence of edema, erythema , echymosis strength and range of motion are unchanged from previous visit.    PROCEDURE:  I have explained the risks, benefits, and alternatives of the procedure in detail.  The patient voices understanding and all questions have been answered.  The patient agrees to proceed as planned. verbal consent obtained. So after I performed a sterile prep of the skin in the normal fashion the right knee is injected using a 22 gauge needle from the anterolateral approach with durolane  solution.     Annika Mac tolerated the procedure well, she was advised to rest the knee today, ice and elevation. I may take 3 -6 weeks following the last injection to get the full benefit of the medication.  I will see her back . Sooner if he has any problems or concerns.           .

## 2023-06-15 ENCOUNTER — CLINICAL SUPPORT (OUTPATIENT)
Dept: PULMONOLOGY | Facility: CLINIC | Age: 73
End: 2023-06-15
Payer: MEDICARE

## 2023-06-15 ENCOUNTER — TELEPHONE (OUTPATIENT)
Dept: PULMONOLOGY | Facility: CLINIC | Age: 73
End: 2023-06-15
Payer: MEDICARE

## 2023-06-15 ENCOUNTER — OFFICE VISIT (OUTPATIENT)
Dept: SLEEP MEDICINE | Facility: CLINIC | Age: 73
End: 2023-06-15
Payer: MEDICARE

## 2023-06-15 ENCOUNTER — HOSPITAL ENCOUNTER (OUTPATIENT)
Dept: RADIOLOGY | Facility: HOSPITAL | Age: 73
Discharge: HOME | End: 2023-06-15
Attending: INTERNAL MEDICINE
Payer: MEDICARE

## 2023-06-15 VITALS
OXYGEN SATURATION: 98 % | WEIGHT: 288.81 LBS | RESPIRATION RATE: 18 BRPM | BODY MASS INDEX: 48.12 KG/M2 | DIASTOLIC BLOOD PRESSURE: 72 MMHG | HEIGHT: 65 IN | SYSTOLIC BLOOD PRESSURE: 124 MMHG | HEART RATE: 71 BPM

## 2023-06-15 DIAGNOSIS — E78.5 DYSLIPIDEMIA ASSOCIATED WITH TYPE 2 DIABETES MELLITUS: ICD-10-CM

## 2023-06-15 DIAGNOSIS — G47.33 OSA (OBSTRUCTIVE SLEEP APNEA): Primary | ICD-10-CM

## 2023-06-15 DIAGNOSIS — J45.20 MILD INTERMITTENT ASTHMA WITHOUT COMPLICATION: ICD-10-CM

## 2023-06-15 DIAGNOSIS — E66.01 MORBID OBESITY: ICD-10-CM

## 2023-06-15 DIAGNOSIS — G47.30 SLEEP-DISORDERED BREATHING: Primary | ICD-10-CM

## 2023-06-15 DIAGNOSIS — E11.69 DYSLIPIDEMIA ASSOCIATED WITH TYPE 2 DIABETES MELLITUS: ICD-10-CM

## 2023-06-15 DIAGNOSIS — I10 ESSENTIAL HYPERTENSION: ICD-10-CM

## 2023-06-15 DIAGNOSIS — G47.30 SLEEP-DISORDERED BREATHING: ICD-10-CM

## 2023-06-15 PROCEDURE — 71046 X-RAY EXAM CHEST 2 VIEWS: CPT | Mod: TC

## 2023-06-15 PROCEDURE — 1101F PR PT FALLS ASSESS DOC 0-1 FALLS W/OUT INJ PAST YR: ICD-10-PCS | Mod: CPTII,S$GLB,, | Performed by: INTERNAL MEDICINE

## 2023-06-15 PROCEDURE — 1160F RVW MEDS BY RX/DR IN RCRD: CPT | Mod: CPTII,S$GLB,, | Performed by: INTERNAL MEDICINE

## 2023-06-15 PROCEDURE — 1160F PR REVIEW ALL MEDS BY PRESCRIBER/CLIN PHARMACIST DOCUMENTED: ICD-10-PCS | Mod: CPTII,S$GLB,, | Performed by: INTERNAL MEDICINE

## 2023-06-15 PROCEDURE — 1159F PR MEDICATION LIST DOCUMENTED IN MEDICAL RECORD: ICD-10-PCS | Mod: CPTII,S$GLB,, | Performed by: INTERNAL MEDICINE

## 2023-06-15 PROCEDURE — 3074F PR MOST RECENT SYSTOLIC BLOOD PRESSURE < 130 MM HG: ICD-10-PCS | Mod: CPTII,S$GLB,, | Performed by: INTERNAL MEDICINE

## 2023-06-15 PROCEDURE — 3008F PR BODY MASS INDEX (BMI) DOCUMENTED: ICD-10-PCS | Mod: CPTII,S$GLB,, | Performed by: INTERNAL MEDICINE

## 2023-06-15 PROCEDURE — 4010F ACE/ARB THERAPY RXD/TAKEN: CPT | Mod: CPTII,S$GLB,, | Performed by: INTERNAL MEDICINE

## 2023-06-15 PROCEDURE — 95012 NITRIC OXIDE EXP GAS DETER: CPT | Mod: S$GLB,,, | Performed by: INTERNAL MEDICINE

## 2023-06-15 PROCEDURE — 1159F MED LIST DOCD IN RCRD: CPT | Mod: CPTII,S$GLB,, | Performed by: INTERNAL MEDICINE

## 2023-06-15 PROCEDURE — 99999 PR PBB SHADOW E&M-EST. PATIENT-LVL V: CPT | Mod: PBBFAC,,, | Performed by: INTERNAL MEDICINE

## 2023-06-15 PROCEDURE — 3066F NEPHROPATHY DOC TX: CPT | Mod: CPTII,S$GLB,, | Performed by: INTERNAL MEDICINE

## 2023-06-15 PROCEDURE — 99204 OFFICE O/P NEW MOD 45 MIN: CPT | Mod: 25,S$GLB,, | Performed by: INTERNAL MEDICINE

## 2023-06-15 PROCEDURE — 3288F FALL RISK ASSESSMENT DOCD: CPT | Mod: CPTII,S$GLB,, | Performed by: INTERNAL MEDICINE

## 2023-06-15 PROCEDURE — 3051F HG A1C>EQUAL 7.0%<8.0%: CPT | Mod: CPTII,S$GLB,, | Performed by: INTERNAL MEDICINE

## 2023-06-15 PROCEDURE — 3078F DIAST BP <80 MM HG: CPT | Mod: CPTII,S$GLB,, | Performed by: INTERNAL MEDICINE

## 2023-06-15 PROCEDURE — 3288F PR FALLS RISK ASSESSMENT DOCUMENTED: ICD-10-PCS | Mod: CPTII,S$GLB,, | Performed by: INTERNAL MEDICINE

## 2023-06-15 PROCEDURE — 4010F PR ACE/ARB THEARPY RXD/TAKEN: ICD-10-PCS | Mod: CPTII,S$GLB,, | Performed by: INTERNAL MEDICINE

## 2023-06-15 PROCEDURE — 71046 XR CHEST PA AND LATERAL: ICD-10-PCS | Mod: 26,,, | Performed by: RADIOLOGY

## 2023-06-15 PROCEDURE — 3074F SYST BP LT 130 MM HG: CPT | Mod: CPTII,S$GLB,, | Performed by: INTERNAL MEDICINE

## 2023-06-15 PROCEDURE — 1101F PT FALLS ASSESS-DOCD LE1/YR: CPT | Mod: CPTII,S$GLB,, | Performed by: INTERNAL MEDICINE

## 2023-06-15 PROCEDURE — 3060F POS MICROALBUMINURIA REV: CPT | Mod: CPTII,S$GLB,, | Performed by: INTERNAL MEDICINE

## 2023-06-15 PROCEDURE — 3051F PR MOST RECENT HEMOGLOBIN A1C LEVEL 7.0 - < 8.0%: ICD-10-PCS | Mod: CPTII,S$GLB,, | Performed by: INTERNAL MEDICINE

## 2023-06-15 PROCEDURE — 99999 PR PBB SHADOW E&M-EST. PATIENT-LVL V: ICD-10-PCS | Mod: PBBFAC,,, | Performed by: INTERNAL MEDICINE

## 2023-06-15 PROCEDURE — 71046 X-RAY EXAM CHEST 2 VIEWS: CPT | Mod: 26,,, | Performed by: RADIOLOGY

## 2023-06-15 PROCEDURE — 3066F PR DOCUMENTATION OF TREATMENT FOR NEPHROPATHY: ICD-10-PCS | Mod: CPTII,S$GLB,, | Performed by: INTERNAL MEDICINE

## 2023-06-15 PROCEDURE — 3078F PR MOST RECENT DIASTOLIC BLOOD PRESSURE < 80 MM HG: ICD-10-PCS | Mod: CPTII,S$GLB,, | Performed by: INTERNAL MEDICINE

## 2023-06-15 PROCEDURE — 3060F PR POS MICROALBUMINURIA RESULT DOCUMENTED/REVIEW: ICD-10-PCS | Mod: CPTII,S$GLB,, | Performed by: INTERNAL MEDICINE

## 2023-06-15 PROCEDURE — 99204 PR OFFICE/OUTPT VISIT, NEW, LEVL IV, 45-59 MIN: ICD-10-PCS | Mod: 25,S$GLB,, | Performed by: INTERNAL MEDICINE

## 2023-06-15 PROCEDURE — 3008F BODY MASS INDEX DOCD: CPT | Mod: CPTII,S$GLB,, | Performed by: INTERNAL MEDICINE

## 2023-06-15 PROCEDURE — 95012 PR NITRIC OXIDE EXPIRED GAS DETERMINATION: ICD-10-PCS | Mod: S$GLB,,, | Performed by: INTERNAL MEDICINE

## 2023-06-15 RX ORDER — ALBUTEROL SULFATE 90 UG/1
2 AEROSOL, METERED RESPIRATORY (INHALATION) EVERY 6 HOURS PRN
Qty: 18 G | Refills: 6 | Status: SHIPPED | OUTPATIENT
Start: 2023-06-15

## 2023-06-15 NOTE — ASSESSMENT & PLAN NOTE
Patient would benefit from weight loss and has tried to set realistic goals to achieve success. Lifestyle changes were discussed on eating healthy, exercising at least 150 minutes weekly, and reducing sedentary behavior.   Discussed the risk factors associated with obesity: Arthritis/RUFINA/Diabetes/Fatty Liver/Cardiovascular disease/GERD/HTN/HLP.

## 2023-06-15 NOTE — PATIENT INSTRUCTIONS
Your provider has scheduled you for a sleep study.   You should be receiving a phone call from the sleep lab shortly after your study has been approved by your insurance. Please make sure you have your current phone numbers in the [facility] system. If you do not hear from anyone in the next 10 business days, please call the sleep lab at [phone] to schedule your sleep study. The sleep studies are performed at [facility] seven nights a week.  When you are scheduling your sleep study, they will also make you a follow up appointment with your provider. This follow up appointment will be 10-14 days after your sleep study to review the results. If it is noted that you do have sleep apnea on your initial sleep study, you may receive a call back for a second night study with the CPAP before you come back to the office.

## 2023-06-15 NOTE — PROGRESS NOTES
"                                           Pulmonary Outpatient  Visit     Subjective:       Patient ID: Annika Mac is a 72 y.o. female.    Social History     Tobacco Use   Smoking Status Never   Smokeless Tobacco Never            Chief Complaint: Apnea and Asthma      Annika Mac is 72 y.o.  Referred by Avril Bianchi, FNP  [id number]  [place]  [place] [id number]   Seen Ms Ferraro last year  Per PCP note April 2023: not using consistently  CPAP take away  PSG reviewed: Moderate to severe RUFINA,   CPAP returned last year  Snoring, apnea, DTS  Comorbidity: BMI, DM, Hx of stroke, HTN  Also carried Dx Asthma: PRN inhaler  No prior spirometry  CXR was reviewed  Never smoker  FeNo was 7       Split night study 11/2013 showed AHI 23.7 that was reduced to 3.3 at 8 cm H20.   She was on CPAP for about a year.   She felt like her air pressure was not set correctly.   PSG 2/2021 AHI 12.3  PSG 9/2021 AHI 6.3 supine          Review of Systems   Constitutional:  Positive for fatigue.   Respiratory:  Positive for apnea, snoring and somnolence.    All other systems reviewed and are negative.    Outpatient Encounter Medications as of 6/15/2023   Medication Sig Dispense Refill    allopurinoL (ZYLOPRIM) 300 MG tablet Take 1 tablet (300 mg total) by mouth once daily. 90 tablet 4    amLODIPine (NORVASC) 10 MG tablet Take 1 tablet (10 mg total) by mouth once daily. 90 tablet 0    aspirin 81 MG Chew Take 1 tablet (81 mg total) by mouth once daily.  0    atorvastatin (LIPITOR) 20 MG tablet Take 1 tablet (20 mg total) by mouth once daily. 90 tablet 3    BD ULTRA-FINE KIKA PEN NEEDLE 32 gauge x 5/32" Ndle To use 4 times daily 200 each 20    blood sugar diagnostic Strp 1 strip 4 times daily. Contour Next. 200 strip 11    blood-glucose meter kit 1 each by Other route once daily. Use daily. Insurance proffered one touch  E11.42 1 each 0    blood-glucose meter,continuous (DEXCOM G6 ) Misc 1 device to monitor blood glucose " with dexcom 1 each 0    blood-glucose sensor (DEXCOM G6 SENSOR) Terese Use 1 device, exchange every 10 days 3 each 11    blood-glucose sensor Terese Affix 1 device to skin every 10 days 3 each 11    blood-glucose transmitter (DEXCOM G6 TRANSMITTER) Terese 1 Device by Misc.(Non-Drug; Combo Route) route every 3 (three) months. 1 each 3    chlorthalidone (HYGROTEN) 25 MG Tab Take 1 tablet (25 mg total) by mouth once daily. 30 tablet 1    dulaglutide (TRULICITY) 4.5 mg/0.5 mL pen injector Inject 4.5 mg into the skin every 7 days. 12 pen 3    DULoxetine (CYMBALTA) 30 MG capsule TAKE 1 CAPSULE BY MOUTH  ONCE DAILY 90 capsule 3    empagliflozin (JARDIANCE) 10 mg tablet Take 1 tablet (10 mg total) by mouth once daily. 90 tablet 3    famotidine (PEPCID) 20 MG tablet Take 1 tablet (20 mg total) by mouth 2 (two) times daily. 1 tablet 0    finasteride (PROSCAR) 5 mg tablet Take 1 tablet (5 mg total) by mouth once daily. 30 tablet 11    glucagon (GVOKE HYPOPEN 1-PACK) 0.5 mg/0.1 mL AtIn Inject 1 Dose into the skin daily as needed (for severe hypoglycemia if you aren't able to treat by ingesting sugar). 1 Syringe 3    HUMALOG KWIKPEN INSULIN 100 unit/mL pen INJECT 11 UNITS INTO THE  SKIN 3 TIMES DAILY BEFORE  MEALS PLUS SLIDING SCALE -  MAX TOTAL DAILY DOSE 63  UNITS 30 mL 6    insulin degludec (TRESIBA FLEXTOUCH U-100) 100 unit/mL (3 mL) insulin pen Inject 40 Units into the skin once daily. 15 pen 3    irbesartan (AVAPRO) 300 MG tablet TAKE 1 TABLET BY MOUTH IN  THE EVENING 90 tablet 3    ketoconazole (NIZORAL) 2 % shampoo Wash hair with medicated shampoo at least 2x/week - let sit on scalp at least 5 minutes prior to rinsing 120 mL 5    labetaloL (NORMODYNE) 300 MG tablet Take 1 tablet (300 mg total) by mouth 2 (two) times daily. 180 tablet 3    lancets 31 gauge Misc 1 lancet by Misc.(Non-Drug; Combo Route) route 4 (four) times daily. E11.42 . Prescribed one touch 200 each 6    multivitamin (THERAGRAN) per tablet Take 1 tablet by  mouth once daily.      pregabalin (LYRICA) 150 MG capsule Take 1 capsule (150 mg total) by mouth 2 (two) time daily. 60 capsule 11    [DISCONTINUED] albuterol (PROAIR HFA) 90 mcg/actuation inhaler Inhale 2 puffs into the lungs every 6 (six) hours as needed for Wheezing. Rescue 18 g 6    albuterol (PROAIR HFA) 90 mcg/actuation inhaler Inhale 2 puffs into the lungs every 6 (six) hours as needed for Wheezing. Rescue 18 g 6    CONSTULOSE 10 gram/15 mL solution SMARTSI Milliliter(s) By Mouth Twice Daily PRN      sodium bicarbonate 325 MG tablet Take 2 tablets (650 mg total) by mouth 2 (two) times daily. 120 tablet 11    [DISCONTINUED] fluocinolone (DERMA-SMOOTHE/FS BODY OIL) 0.01 % external oil Apply topically 3 (three) times daily. (Patient not taking: Reported on 2023) 120 mL 5    [DISCONTINUED] fluocinolone and shower cap (DERMA-SMOOTHE/FS SCALP OIL) 0.01 % Oil Apply oil to damp scalp nightly and cover with shower cap. (Patient not taking: Reported on 2023) 118.28 mL 5    [DISCONTINUED] insulin glargine, TOUJEO, (TOUJEO SOLOSTAR) 300 unit/mL (1.5 mL) InPn pen INJECT 40 UNITS INTO THE SKIN EVERY EVENING TITRATE UP AS DIRECTED. 4.5 mL 0    [DISCONTINUED] lactulose (CHRONULAC) 20 gram/30 mL Soln Take 30 mLs (20 g total) by mouth 2 (two) times daily as needed (constipation). 1800 mL 2     Facility-Administered Encounter Medications as of 6/15/2023   Medication Dose Route Frequency Provider Last Rate Last Admin    fentaNYL injection 25 mcg  25 mcg Intravenous Q5 Min PRN Desmond Fontana MD   50 mcg at 21 0955    [COMPLETED] hyaluronate sodium, stabilized (DUROLANE) 60 mg/3 mL 60 mg  60 mg Intra-articular 1 time in Clinic/HOD Homero Leggett PA-C   60 mg at 23 1530    midazolam (VERSED) 1 mg/mL injection 0.5 mg  0.5 mg Intravenous PRN Desmond Fontana MD   2 mg at 21 0955    triamcinolone acetonide injection 10 mg  10 mg Intradermal Once Susie Curran MD         triamcinolone acetonide injection 10 mg  10 mg Intradermal Once Susie Curran MD           The following portions of the patient's history were reviewed and updated as appropriate: She  has a past medical history of Asthma, Chronic obstructive pulmonary disease, unspecified COPD type (2022), Constipation (10/3/2019), Deep vein thrombophlebitis of left leg (2012), Deep vein thrombosis, Diabetic foot ulcer with osteomyelitis, Encounter for blood transfusion, GERD (gastroesophageal reflux disease), Obesity, diabetes, and hypertension syndrome (2019), Obstructive sleep apnea (2012), PAD (peripheral artery disease) (2013), Pressure ulcer of toe of left foot, Type 2 diabetes mellitus with obesity (2020), and Type 2 diabetes mellitus with peripheral artery disease (2020).  She does not have any pertinent problems on file.  She  has a past surgical history that includes Total knee arthroplasty (2006); Total abdominal hysterectomy (); Ganglion cyst excision (2007); Cyst Removal (2011); Trigger finger release (2009);  section; Cataract extraction w/  intraocular lens implant (Left, 3/2002); Cataract extraction w/  intraocular lens implant (Right, ); Pan Retinal Photocoagulation (Bilateral); Eye surgery (Bilateral, ); Colonoscopy (N/A, 2018); Cystoscopy w/ retrogrades (Left, 2020); Joint replacement (Left); Interposition arthroplasty of carpometacarpal joints (Left, 2021); De Quervain's release (2021); Injection of facet joint (Bilateral, 2021); Injection of anesthetic agent around medial branch nerves innervating lumbar facet joint (Bilateral, 2022); Injection of anesthetic agent around medial branch nerves innervating lumbar facet joint (Bilateral, 2022); Radiofrequency thermocoagulation (Right, 2022); Radiofrequency thermocoagulation (Left, 2022); Esophagogastroduodenoscopy (N/A, 2023); and  "Colonoscopy (N/A, 4/26/2023).  Her family history includes Diabetes in her brother, mother, and sister; Heart disease in her father; Hypertension in her brother and mother; No Known Problems in her brother, daughter, daughter, sister, and son; Thyroid disease in her brother.  She  reports that she has never smoked. She has never used smokeless tobacco. She reports that she does not drink alcohol and does not use drugs.  She has a current medication list which includes the following prescription(s): allopurinol, amlodipine, aspirin, atorvastatin, bd ultra-fine myesha pen needle, blood sugar diagnostic, blood-glucose meter, dexcom g6 , dexcom g6 sensor, blood-glucose sensor, dexcom g6 transmitter, chlorthalidone, trulicity, duloxetine, empagliflozin, famotidine, finasteride, gvoke hypopen 1-pack, humalog kwikpen insulin, insulin degludec, irbesartan, ketoconazole, labetalol, lancets, multivitamin, pregabalin, albuterol, constulose, sodium bicarbonate, and [DISCONTINUED] insulin glargine (toujeo), and the following Facility-Administered Medications: fentanyl, midazolam, triamcinolone acetonide, and triamcinolone acetonide.  Current Outpatient Medications on File Prior to Visit   Medication Sig Dispense Refill    allopurinoL (ZYLOPRIM) 300 MG tablet Take 1 tablet (300 mg total) by mouth once daily. 90 tablet 4    amLODIPine (NORVASC) 10 MG tablet Take 1 tablet (10 mg total) by mouth once daily. 90 tablet 0    aspirin 81 MG Chew Take 1 tablet (81 mg total) by mouth once daily.  0    atorvastatin (LIPITOR) 20 MG tablet Take 1 tablet (20 mg total) by mouth once daily. 90 tablet 3    BD ULTRA-FINE MYESHA PEN NEEDLE 32 gauge x 5/32" Ndle To use 4 times daily 200 each 20    blood sugar diagnostic Strp 1 strip 4 times daily. Contour Next. 200 strip 11    blood-glucose meter kit 1 each by Other route once daily. Use daily. Insurance proffered one touch  E11.42 1 each 0    blood-glucose meter,continuous (DEXCOM G6 ) " Misc 1 device to monitor blood glucose with dexcom 1 each 0    blood-glucose sensor (DEXCOM G6 SENSOR) Terese Use 1 device, exchange every 10 days 3 each 11    blood-glucose sensor Terese Affix 1 device to skin every 10 days 3 each 11    blood-glucose transmitter (DEXCOM G6 TRANSMITTER) Terese 1 Device by Misc.(Non-Drug; Combo Route) route every 3 (three) months. 1 each 3    chlorthalidone (HYGROTEN) 25 MG Tab Take 1 tablet (25 mg total) by mouth once daily. 30 tablet 1    dulaglutide (TRULICITY) 4.5 mg/0.5 mL pen injector Inject 4.5 mg into the skin every 7 days. 12 pen 3    DULoxetine (CYMBALTA) 30 MG capsule TAKE 1 CAPSULE BY MOUTH  ONCE DAILY 90 capsule 3    empagliflozin (JARDIANCE) 10 mg tablet Take 1 tablet (10 mg total) by mouth once daily. 90 tablet 3    famotidine (PEPCID) 20 MG tablet Take 1 tablet (20 mg total) by mouth 2 (two) times daily. 1 tablet 0    finasteride (PROSCAR) 5 mg tablet Take 1 tablet (5 mg total) by mouth once daily. 30 tablet 11    glucagon (GVOKE HYPOPEN 1-PACK) 0.5 mg/0.1 mL AtIn Inject 1 Dose into the skin daily as needed (for severe hypoglycemia if you aren't able to treat by ingesting sugar). 1 Syringe 3    HUMALOG KWIKPEN INSULIN 100 unit/mL pen INJECT 11 UNITS INTO THE  SKIN 3 TIMES DAILY BEFORE  MEALS PLUS SLIDING SCALE -  MAX TOTAL DAILY DOSE 63  UNITS 30 mL 6    insulin degludec (TRESIBA FLEXTOUCH U-100) 100 unit/mL (3 mL) insulin pen Inject 40 Units into the skin once daily. 15 pen 3    irbesartan (AVAPRO) 300 MG tablet TAKE 1 TABLET BY MOUTH IN  THE EVENING 90 tablet 3    ketoconazole (NIZORAL) 2 % shampoo Wash hair with medicated shampoo at least 2x/week - let sit on scalp at least 5 minutes prior to rinsing 120 mL 5    labetaloL (NORMODYNE) 300 MG tablet Take 1 tablet (300 mg total) by mouth 2 (two) times daily. 180 tablet 3    lancets 31 gauge Misc 1 lancet by Misc.(Non-Drug; Combo Route) route 4 (four) times daily. E11.42 . Prescribed one touch 200 each 6    multivitamin  (THERAGRAN) per tablet Take 1 tablet by mouth once daily.      pregabalin (LYRICA) 150 MG capsule Take 1 capsule (150 mg total) by mouth 2 (two) time daily. 60 capsule 11    [DISCONTINUED] albuterol (PROAIR HFA) 90 mcg/actuation inhaler Inhale 2 puffs into the lungs every 6 (six) hours as needed for Wheezing. Rescue 18 g 6    CONSTULOSE 10 gram/15 mL solution SMARTSI Milliliter(s) By Mouth Twice Daily PRN      sodium bicarbonate 325 MG tablet Take 2 tablets (650 mg total) by mouth 2 (two) times daily. 120 tablet 11    [DISCONTINUED] fluocinolone (DERMA-SMOOTHE/FS BODY OIL) 0.01 % external oil Apply topically 3 (three) times daily. (Patient not taking: Reported on 2023) 120 mL 5    [DISCONTINUED] fluocinolone and shower cap (DERMA-SMOOTHE/FS SCALP OIL) 0.01 % Oil Apply oil to damp scalp nightly and cover with shower cap. (Patient not taking: Reported on 2023) 118.28 mL 5    [DISCONTINUED] insulin glargine, TOUJEO, (TOUJEO SOLOSTAR) 300 unit/mL (1.5 mL) InPn pen INJECT 40 UNITS INTO THE SKIN EVERY EVENING TITRATE UP AS DIRECTED. 4.5 mL 0    [DISCONTINUED] lactulose (CHRONULAC) 20 gram/30 mL Soln Take 30 mLs (20 g total) by mouth 2 (two) times daily as needed (constipation). 1800 mL 2     Current Facility-Administered Medications on File Prior to Visit   Medication Dose Route Frequency Provider Last Rate Last Admin    fentaNYL injection 25 mcg  25 mcg Intravenous Q5 Min PRN Desmond Fontana MD   50 mcg at 21 09    midazolam (VERSED) 1 mg/mL injection 0.5 mg  0.5 mg Intravenous PRN Desmond Fontana MD   2 mg at 21 0955    triamcinolone acetonide injection 10 mg  10 mg Intradermal Once Susie Curran MD        triamcinolone acetonide injection 10 mg  10 mg Intradermal Once Susie Curran MD         She is allergic to clindamycin and iodinated contrast media..      BP Readings from Last 3 Encounters:   06/15/23 124/72   23 130/80   23 136/84     Snoring /  "Sleep:     [place] Questionnaire (validated RUFINA screening questionnaire)    YES -- Snoring/apnea    YES -- Fatigue    Body mass index is 48.06 kg/m².  (>25 is overweight, >30 is obese)    Blood Pressure = Hypertension  (PreHTN 120-139/80-89, Stg1 140-159/90-99, Stg2 >160/>100)  [place] = 3 of three RUFINA categories are positive (high risk is 2-3 positive categories)     Hadley Sleepiness Scale TOTAL =      EPWORTH SLEEPINESS SCALE 6/15/2023   Sitting and reading 1   Watching TV 0   Sitting, inactive in a public place (e.g. a theatre or a meeting) 0   As a passenger in a car for an hour without a break 1   Lying down to rest in the afternoon when circumstances permit 3   Sitting and talking to someone 0   Sitting quietly after a lunch without alcohol 0   In a car, while stopped for a few minutes in traffic 0   Total score 5      (validated sleepiness questionnaire with a higher score indicating greater sleepiness; range 0-24)  No flowsheet data found.    STOP-Bang Questionnaire (validated RUFINA screening questionnaire)  Negative unless checked off.  [x] Snoring    [x]  Tired/Fatigued/Sleepy  [x] Obstruction (apneas/choking)  [x] Pressure (HTN)  [] BMI >35  [x] Age >50  [] Neck >40 cm  [] Gender male   STOP-Bang = 5 (low risk 0-2,high risk 3-8)    Neck circumference 16"        MMRC Dyspnea Scale (4 is worst)     [] MMRC 0: Dyspneic on strenuous excercise (0 points)    [x] MMRC 1: Dyspneic on walking a slight hill (0 points)    [] MMRC 2: Dyspneic on walking level ground; must stop occasionally due to breathlessness (1 point)    [] MMRC 3: Must stop for breathlessness after walking 100 yards or after a few minutes (2 points)    [] MMRC 4: Cannot leave house; breathless on dressing/undressing (3 points)       Asthma Control Test  In the past 4  weeks, how much of the time did your asthma keep you from getting as much done at work, school or at home?: None of the time  During the past 4 weeks, how often have you had shortness " "of breath?: More than once a day  During the past 4 weeks, how often did your asthma symptoms (wheezing, couging, shortness of breath, chest tightness or pain) wake you up at night or earlier than usual in the morning?: Not at all  During the past 4 weeks, how often have you used your rescue inhaler or nebulizer medication (such as albuterol)?: Not at all  How would you rate your asthma control during the past 4 weeks?: Somewhat controlled  If your score is 19 or less, your asthma may not be under control: 19             No flowsheet data found.              Objective:     Vital Signs (Most Recent)  Vital Signs  Pulse: 71  Resp: 18  SpO2: 98 %  BP: 124/72  Height and Weight  Height: 5' 5" (165.1 cm)  Weight: 131 kg (288 lb 12.8 oz)  BSA (Calculated - sq m): 2.45 sq meters  BMI (Calculated): 48.1  Weight in (lb) to have BMI = 25: 149.9]  Wt Readings from Last 2 Encounters:   06/15/23 131 kg (288 lb 12.8 oz)   06/07/23 130.9 kg (288 lb 9.3 oz)       Physical Exam  Vitals and nursing note reviewed.   Constitutional:       Appearance: She is normal weight.   HENT:      Head: Normocephalic and atraumatic.      Comments: Malampati score 4     Nose: Nose normal.   Eyes:      Pupils: Pupils are equal, round, and reactive to light.   Cardiovascular:      Rate and Rhythm: Normal rate and regular rhythm.      Pulses: Normal pulses.      Heart sounds: Normal heart sounds.   Pulmonary:      Effort: Pulmonary effort is normal.      Breath sounds: Normal breath sounds.   Abdominal:      General: Bowel sounds are normal.      Palpations: Abdomen is soft.   Musculoskeletal:      Cervical back: Normal range of motion.   Skin:     General: Skin is warm.   Neurological:      General: No focal deficit present.      Mental Status: She is alert and oriented to person, place, and time.   Psychiatric:         Mood and Affect: Mood normal.        Laboratory  Lab Results   Component Value Date    WBC 5.60 05/16/2023    RBC 3.49 (L) 05/16/2023 "    HGB 9.8 (L) 05/16/2023    HCT 31.5 (L) 05/16/2023    MCV 90 05/16/2023    MCH 28.1 05/16/2023    MCHC 31.1 (L) 05/16/2023    RDW 14.7 (H) 05/16/2023     05/16/2023    MPV 10.9 05/16/2023    GRAN 2.8 05/16/2023    GRAN 49.7 05/16/2023    LYMPH 1.9 05/16/2023    LYMPH 34.6 05/16/2023    MONO 0.6 05/16/2023    MONO 10.0 05/16/2023    EOS 0.3 05/16/2023    BASO 0.03 05/16/2023    EOSINOPHIL 4.8 05/16/2023    BASOPHIL 0.5 05/16/2023       BMP  Lab Results   Component Value Date     05/16/2023    K 4.2 05/16/2023     05/16/2023    CO2 22 (L) 05/16/2023    BUN 42 (H) 05/16/2023    CREATININE 1.6 (H) 05/16/2023    CALCIUM 10.1 05/16/2023    ANIONGAP 11 05/16/2023    ESTGFRAFRICA 29.9 (A) 06/13/2022    EGFRNONAA 26.0 (A) 06/13/2022    AST 29 05/16/2023    ALT 24 05/16/2023    PROT 6.4 05/16/2023          No results found for: IGE     No results found for: ASPERGILLUS  No results found for: AFUMIGATUSCL     No results found for: ACE     Diagnostic Results:  I have personally reviewed today the following studies:    X-Ray Chest PA And Lateral  Narrative: EXAM:  XR CHEST PA AND LATERAL    CLINICAL INDICATION:  Sleep apnea, unspecified.    FINDINGS: PA and and lateral views of the chest were obtained.    Comparisons are made to 11/08/2021.  2 frontal views were obtained.     The lungs are clear. The cardiac silhouette size is normal. The trachea is midline and the mediastinal width is normal. Negative for focal infiltrate, effusion or pneumothorax. Pulmonary vasculature is normal. Negative for osseous abnormalities.  Marked marginal spondylosis.  Tortuous aorta with aortic arch calcifications.  Cardiophrenic fat pads.  Mild apical pleural-parenchymal changes.  Impression: 1.  Negative for acute process involving the chest.  2.  Stable findings as noted above.    Finalized on: 6/15/2023 1:26 PM By:  Zach Smith MD  R# [id number]      2023-06-15 13:28:27.517    [facility]     Clinical Guide to Interpretation or  FeNO Levels :     FeNO  (ppb) LOW INTERMEDIATE HIGH   ADULT VALUES < 25 25-50          > 50   Th2-driven Inflammation Unlikely Likely Significant      Patients FeNO level at this visit : 7 (ppb)     Interpretation of FeNO measurement in adults:  [x] FENO is less than 25 ppb implies non eosinophilic airway inflammation or the absence of airway inflammation.               Comment: Low FENO (<25 ppb) in adult asthmatics with persistent symptoms suggests other etiologies for these symptoms and a lower likelihood of benefit from adding or increasing inhaled glucocorticoids.     Patient Name: ZULEIMA [facility] #:  [id number]    Sex:  Female  Study Date:  2013    :  1950  Clinic #:  [id number]    Age:  62  Referring Physician:  Juanita Santana MD    Height:  65.0 in  Referring Physician #  [id number]    Weight:  340.0 lbs  Sleep Specialist:  Juanita Santana MD    B.M.I.:  56.6  Sleep Specialist #  [id number]    Hypopnea rule:  4B  Scoring Tech:  NERY Sheehan    Diagnostic AHI:  23.7  Recording Tech  Leanett Sandifer, RRT    Lowest O2 sat:  87.0%  Recording Location:  [facility]      The left ventricle is normal in size with concentric remodeling and normal systolic function. The estimated ejection fraction is 60%.  Normal right ventricular size with normal right ventricular systolic function.  Grade I left ventricular diastolic dysfunction.  The estimated PA systolic pressure is 39 mmHg.  Normal central venous pressure (3 mmHg).  The ECG portion of this study is negative for myocardial ischemia.  The stress echo portion of this study is negative for myocardial ischemia.  Overall, this study is negative for myocardial ischemia.  Assessment/Plan:     Problem List Items Addressed This Visit       RUFINA (obstructive sleep apnea) - Primary     Known Moderate RUFINA  Abandoned PAP therapy   Seeks restart  PSG ordered  Consider liberal split         Relevant Orders    Polysomnogram (CPAP will be added if patient meets  diagnostic criteria.)    Essential hypertension     Stable labatalol and Norvasc, AVAPRO         Morbid obesity     Patient would benefit from weight loss and has tried to set realistic goals to achieve success. Lifestyle changes were discussed on eating healthy, exercising at least 150 minutes weekly, and reducing sedentary behavior.   Discussed the risk factors associated with obesity: Arthritis/RUFINA/Diabetes/Fatty Liver/Cardiovascular disease/GERD/HTN/HLP.          Asthma     FeNo was 7  Refilled Albuterol  [place] next visit  UTD immunisations         Relevant Medications    albuterol (PROAIR HFA) 90 mcg/actuation inhaler    Other Relevant Orders    Spirometry with/without bronchodilator    Fraction of  Nitric Oxide (Completed)    Dyslipidemia associated with type 2 diabetes mellitus     Stable LIPITOR           My recommendation at this point would be to set up a HOME SLEEP TEST or INLAB POLYSOMNOGRAPHY  through [facility] ( [phone].    We have discussed weight loss , non supine position, good sleep hygiene, and  other interventions to foster good natural healthy sleep.  We have discussed behavioral modifications, as well.  After her study, she  could certainly try PAP therapy or out suitable alternatives        Follow up in about 6 weeks (around 2023), or PSG, LILLY, FeNO.    This note was prepared using voice recognition system and is likely to have sound alike errors that may have been overlooked even after proof reading.  Please call me with any questions    Discussed diagnosis, its evaluation, treatment and usual course. All questions answered.    Thank you for the courtesy of participating in the care of this patient    Trent Leon MD      Personal Diagnostic Review  [x]  CXR    [x]  Sleep study    [x]  echo    []  6MWD    []  LABS    []  CHEST CT    []  PET CT    []  Biopsy results

## 2023-06-15 NOTE — PROCEDURES
Clinical Guide to Interpretation or FeNO Levels :    FeNO  (ppb) LOW INTERMEDIATE HIGH   ADULT VALUES < 25 25-50          > 50   Th2-driven Inflammation Unlikely Likely Significant     Patients FeNO level at this visit : 7 (ppb)    Interpretation of FeNO measurement in adults:  [x] FENO is less than 25 ppb implies non eosinophilic airway inflammation or the absence of airway inflammation.    Comment: Low FENO (<25 ppb) in adult asthmatics with persistent symptoms suggests other etiologies for these symptoms and a lower likelihood of benefit from adding or increasing inhaled glucocorticoids.    [] FENO between 25 and 50 ppb in adults should be interpreted cautiously with reference to the clinical situation (eg, symptomatic, on or off therapy, current smoking).    [] FENO greater than 50 ppb in adults  suggests eosinophilic airway inflammation   Comment: High FENO (>50 ppb) in adult asthmatics even with atypical symptoms suggests glucocorticoid responsiveness. High FENO (>50 ppb) can help identify poor adherence or uncontrolled inflammation in asthma patients with otherwise seemingly controlled asthma.    Discussion:  A FENO less than 25 ppb in adults and less than 20 ppb in children younger than 12 years of age implies noneosinophilic airway inflammation or the absence of airway inflammation.  A FENO greater than 50 ppb in adults or greater than 35 ppb in children suggests eosinophilic airway inflammation.   Values of FENO between 25 and 50 ppb in adults (20 to 35 ppb in children) should be interpreted cautiously with reference to the clinical situation (eg, symptomatic, on or off therapy, current smoking).  A rising FENO with a greater than 20 percent change and more than 25 ppb (20 ppb in children) from a previously stable level suggests increasing eosinophilic airway inflammation, but there are wide inter-individual differences.  A decrease in FENO greater than 20 percent for values over 50 ppb or more than 10  ppb for values less than 50 ppb may be clinically important.  ?FENO in other respiratory diseases - Several other diseases are associated with altered levels of exhaled NO: low levels of FENO have been noted in cystic fibrosis, current smoking, pulmonary hypertension, hypothermia, primary ciliary dyskinesia, and bronchopulmonary dysplasia. Elevated FENO has been noted in atopy, nonasthmatic eosinophilic bronchitis, COPD exacerbations, noncystic fibrosis bronchiectasis, and viral upper respiratory infections.    REFERENCE:  ATS Board of Directors, December 2004, and by the ERS Executive Committee, June 2004. ATS/ERS Recommendations for Standardized Procedures for the Online and Offline Measurement of Exhaled Lower Respiratory Nitric Oxide and Nasal Nitric Oxide. Guideline 2005

## 2023-06-18 DIAGNOSIS — I10 ESSENTIAL HYPERTENSION: ICD-10-CM

## 2023-06-19 ENCOUNTER — TELEPHONE (OUTPATIENT)
Dept: PODIATRY | Facility: CLINIC | Age: 73
End: 2023-06-19
Payer: MEDICARE

## 2023-06-19 ENCOUNTER — TELEPHONE (OUTPATIENT)
Dept: ENDOCRINOLOGY | Facility: CLINIC | Age: 73
End: 2023-06-19
Payer: MEDICARE

## 2023-06-19 DIAGNOSIS — E11.42 TYPE 2 DIABETES MELLITUS WITH DIABETIC POLYNEUROPATHY, UNSPECIFIED WHETHER LONG TERM INSULIN USE: Primary | ICD-10-CM

## 2023-06-19 NOTE — TELEPHONE ENCOUNTER
----- Message from Melanie Balderas MA sent at 6/19/2023  9:36 AM CDT -----  Regarding: FW: G6 Device  Contact: pt.[phone]  Pt needs to order to schedule DE  ----- Message -----  From: Danielle OCHOA Route  Sent: 6/19/2023   9:32 AM CDT  To: Nighat Sheets Staff  Subject: G6 Device                                        Pt is calling in ref to G6 Device. She wants to have an appt to get it put on. Pt says she is in the area she had two appts cancelled after she arrived. Wanting to make the best of the trip if possible. Patient Requesting Call Back @ pt.[phone]

## 2023-06-20 RX ORDER — AMLODIPINE BESYLATE 10 MG/1
TABLET ORAL
Qty: 90 TABLET | Refills: 0 | Status: SHIPPED | OUTPATIENT
Start: 2023-06-20 | End: 2023-09-05

## 2023-06-21 ENCOUNTER — CLINICAL SUPPORT (OUTPATIENT)
Dept: DIABETES | Facility: CLINIC | Age: 73
End: 2023-06-21
Payer: MEDICARE

## 2023-06-21 ENCOUNTER — PATIENT MESSAGE (OUTPATIENT)
Dept: ADMINISTRATIVE | Facility: OTHER | Age: 73
End: 2023-06-21
Payer: MEDICARE

## 2023-06-21 DIAGNOSIS — N18.32 TYPE 2 DIABETES MELLITUS WITH STAGE 3B CHRONIC KIDNEY DISEASE AND HYPERTENSION: Primary | ICD-10-CM

## 2023-06-21 DIAGNOSIS — E11.22 TYPE 2 DIABETES MELLITUS WITH STAGE 3B CHRONIC KIDNEY DISEASE AND HYPERTENSION: Primary | ICD-10-CM

## 2023-06-21 DIAGNOSIS — I12.9 TYPE 2 DIABETES MELLITUS WITH STAGE 3B CHRONIC KIDNEY DISEASE AND HYPERTENSION: Primary | ICD-10-CM

## 2023-06-21 PROCEDURE — G0108 DIAB MANAGE TRN  PER INDIV: HCPCS | Mod: S$GLB,,, | Performed by: INTERNAL MEDICINE

## 2023-06-21 PROCEDURE — G0108 PR DIAB MANAGE TRN  PER INDIV: ICD-10-PCS | Mod: S$GLB,,, | Performed by: INTERNAL MEDICINE

## 2023-06-22 ENCOUNTER — PATIENT OUTREACH (OUTPATIENT)
Dept: ADMINISTRATIVE | Facility: OTHER | Age: 73
End: 2023-06-22
Payer: MEDICARE

## 2023-06-22 NOTE — PROGRESS NOTES
Diabetes Care Specialist Progress Note  Author: Maria G Lopez RN, CDE  Date: 6/21/2023    Program Intake  Reason for Diabetes Program Visit:: Intervention  Type of Intervention:: Individual  Individual: Device Training  Device Training: Personal CGM  Current diabetes risk level:: moderate  In the last 12 months, have you:: none  Was the ER or hospital admission related to diabetes?: No  Permission to speak with others about care:: no    Lab Results   Component Value Date    HGBA1C 7.7 (H) 01/05/2023     Current Diabetic Medications:  Tresiba 45 units in am; Humalog 11 units ac meals plus s/s; Truliciy 4.5 mg weekly    Clinical    Problem Review  Reviewed Problem List with Patient: yes  Active comorbidities affecting diabetes self-care.: yes  Comorbidities: Hypertension, Chronic Kidney Disease, Other (comment) (Difficulty with ambulation)  Reviewed health maintenance: yes    Clinical Assessment  Have you ever experienced hypoglycemia (low blood sugar)?: yes  In the last month, how often have you experienced low blood sugar?: once a day  What symptoms do you experience?: Dizzy/Light-headed, Shaky  Have you ever been hospitalized because your blood sugar was too low?: yes (see comments) (Was treated at hospital in April for low bg)  Have you ever experienced hyperglycemia (high blood sugar)?: yes  In the last month, how often have you experienced high blood sugar?: more than once a day      Nutritional Status  Meal Plan 24 Hour Recall: Breakfast, Lunch, Snack  Meal Plan 24 Hour Recall - Breakfast: yesterday; waffles, eggs, turkey phan  Meal Plan 24 Hour Recall - Lunch: cabbage and corn bread  Meal Plan 24 Hour Recall - Snack: juice, peanut butter crackers    Additional Social History    Support  Does anyone support you with your diabetes care?: yes    Access to Mass Media & Technology  Does the patient have access to any of the following devices or technologies?: Smart phone (Not compatible with Dexcom  nicholas)      Health Literacy  Preferred Learning Method: Face to Face      Diabetes Self-Management Skills Assessment    Diabetes Disease Process/Treatment Options  Patient/caregiver able to state what happens when someone has diabetes.: yes  Patient/caregiver knows what type of diabetes they have.: yes  Diabetes Type : Type II  Assessment indicates:: Adequate understanding  Area of need?: No    Nutrition/Healthy Eating  Challenges to healthy eating:: portion control  Method of carbohydrate measurement:: no method  Patient can identify foods that impact blood sugar.: yes  Patient-identified foods:: sweets  Assessment indicates:: Knowledge deficit, Instruction Needed  Area of need?: Yes    Physical Activity/Exercise  Patient's daily activity level:: sedentary  Patient formally exercises outside of work.: no  Reasons for not exercising:: physically unable to exercise currently  Patient can identify forms of physical activity.: no  Assessment indicates:: Knowledge deficit  Area of need?: Yes    Medications  Patient is able to describe current diabetes management routine.: yes  Diabetes management routine:: insulin  Patient is able to identify current diabetes medications, dosages, and appropriate timing of medications.: yes  Patient understands the purpose of the medications taken for diabetes.: yes  Patient reports problems or concerns with current medication regimen.: yes  Medication regimen problems/concerns:: concerned about side effects  Assessment indicates:: Adequate understanding  Area of need?: No    Home Blood Glucose Monitoring  Patient states that blood sugar is checked at home daily.: yes  Monitoring Method:: home glucometer  Home glucometer meter type:: Insurance preferred meter  Blood glucose logs:: yes, encouraged to keep logs, encouraged to bring logs to provider visits  Blood glucose logs reviewed today?: yes  Assessment indicates:: Adequate understanding  Area of need?: No    Acute Complications  Able  to state the blood sugar range for hypoglycemia?: yes  Patient can identify general symptoms of hypoglycemia: yes  Patient identified:: shakiness  Able to state proper treatment of hypoglycemia?: yes  Patient identified:: 5-6 pieces of hard candy, 1/2 can soda/fruit juice  Assessment indicates:: Adequate understanding  Area of need?: No    Chronic Complications  Patient can identify major chronic complications of diabetes.: yes  Stated chronic complications:: kidney disease  Patient can identify ways to prevent or delay diabetes complications.: yes  Stated ways to prevent complications:: controlling blood sugar  Assessment indicates:: Knowledge deficit  Area of need?: Yes    Psychosocial/Coping  Patient can identify ways of coping with chronic disease.: yes  Patient-stated ways of coping with chronic disease:: support from loved ones  Assessment indicates:: Adequate understanding  Area of need?: No      Diabetes Self Support Plan         Assessment Summary and Plan    Based on today's diabetes care assessment, the following areas of need were identified:      Social 12/1/2022   Cognitive/Behavioral Health No   Communication No        Clinical 8/31/2022   Nutritional Status Yes        Diabetes Self-Management Skills 6/21/2023   Diabetes Disease Process/Treatment Options No   Nutrition/Healthy Eating Yes, see care planning   Physical Activity/Exercise Yes, encouraged to move as tolerated   Medication No   Home Blood Glucose Monitoring No, see care planning Dexcom training          Today's interventions were provided through individual discussion, instruction, and written materials were provided.      Patient verbalized understanding of instruction and written materials.  Pt was able to return back demonstration of instructions today. Patient understood key points, needs reinforcement and further instruction.     Diabetes Self-Management Care Plan:    Today's Diabetes Self-Management Care Plan was developed with Dominique  input. Annika has agreed to work toward the following goal(s) to improve his/her overall diabetes control.      Care Plan: Diabetes Management   Updates made since 5/23/2023 12:00 AM        Problem: Medications         Goal: Patient Agrees to take Diabetes Medication(s) as prescribed.    Start Date: 5/4/2021   Expected End Date: 10/24/2023   This Visit's Progress: On track   Recent Progress: On track   Priority: High   Barriers: No Barriers Identified   Note:    Dexcom download was evaluated.  She is having lows at various times of the days. Most lows are occurring overnight and in the afternoon which corresponds to her reported low she experinced a few weeks back where intervention was required. Denies any missed doses and is eating 3 meals per day.  Per HCP her insulin will be reduced to the following: Novolin 70/30 80 units in am and 40 units in pm.  She will stop giving the lunch dose.     She records her carb amounts in Dexcom, however she has been entering them incorrectly and reflects a larger amount of carbs than what she is consuming.   She was instructed on how to enter the correct amt into her Dexcom.     Update to care planning 2/17/2022:  Currently taking Tresiba 30 units in am, Humalog 10 units ac meals and Ozempic.  Blood glucose logs reviewed.  Patient doing well with her numbers see attached logs.  Not using the Dexcom.  Current A1c down to 6.6%    Update to care planning 4/18/2022:  current A1c is up at 8.1%.  this is an increase from January A1c.  Dr. Love has increased her Tresiba to 35 and Humalog 10/11/11 plus s/s.  She also has been in pain with her back which is causing her stress and discomfort.  This may also be a contributing factor to her elevated numbers. Continues with ozempic.     Update to care planning 5/9/2022: Patient today for DM ed.  BG logs reviewed by MD. Dr. Love increased her Tresiba from 35 to 40 units daily for elevations especially in am.  Ms. Mac was not using the  sliding scale as ordered.  Reviewed how to use it and instructed to use 150 +1 s/s with Humalog only.  Currently takes 10/11/11 with meals.  Understanding verbalized.     Update to care planning 12/1/2022: Ms. Roblero was seen for review of her bg logs. Mostly elevated in 152 to 198 range in morning; lunch and dinner same.  Her A1c has improved since April.  Reviewed logs with Dr. Love and will make the following changes: Increase Tresiba to 45 units in am and Humalog 12 units ac meals plus s/s 150 +2.  Understanding verbalized.     Update to care planning 2/22/23:  Ms. Roblero was seen today in clinic for bg log review.  She is planning on restarting her Dexcom and is awaiting delivery from distributor. BG in  acceptable ranges with no low glucose noted.  Highest seen per logs 255. Taking insulin as instruction without issues. Denies missed doses.  Advised to contact me when she receives her Dexcom for training.     Update to care planning 6/21/2023:  On track, no issues reported. Denies missed doses.        Problem: Healthy Eating         Goal: Eat 2-3 meals daily with 30-45g/2-3 servings of Carbohydrate per meal. Limit snacking in between meal to 1 serving (15 grams).    Start Date: 8/31/2022   Expected End Date: 10/24/2023   This Visit's Progress: On track   Recent Progress: On track   Priority: Medium   Barriers: Lack of Motivation to Change; No Barriers Identified   Note:    Reviewed meal planning and general nutritional counseling with regards to diabetes management. Meal habits reviewed. We concentrated on portion sizes and carb limits for each meal.  This has been a challenge for Annika in the past.  Stress the importance of making better meal choices and to measure all foods.   Encouraged increased consumption of non starchy veggies to diet.  Overall planning meals and making better choices.  Patient is motivated to make changes.     Update to care planning 4/18/2022: States eating schedule is off since she  cares for grandchildren 3-5 x per week and tends to eat what is available. Suggested bringing fruit and salad with her when she is out of the house and to try to avoid snack type foods. States wants to do better and is willing to try.     Update to care planning 5/9/2022:  Reviewed changes made since last visit.  States she feels she is more on schedule with her meals now.  Usually doing a shake for breakfast.  Encouraged to measure all foods especially carbs. States she is eating more salads.     Update to care planning 8/31/22: Pt stated she needed help with carb counting. We reviewed the carb list, carb servings and grams of carbs. I also showed her the plate method and she seemed interested in using that to monitor her carb intake.     Update to care planning 12/1/2022:  Reviewed meal planning today.  Doing better, less snacking. She needs to lose 18 lbs to qualify for knee replacement.  Advised to stay on low carb diet, avoid any sources of sugar in foods, perform some chair exercising like stretching. Increase consumption of non starchy veggies in diet.  No snacking.    Update to care planning 2/22/23:  Ms. Roblero has gained about 5-6 pounds recently. Her weight loss gain is 18 lbs to qualify for knee surgery.  Maybe be doing more snacking than usual.  Mostly on Peanut butter crackers. Drinking sweet tea again.  We reviewed the importance of portion control and she was encouraged to add more veggies to diet.  Explained in order to lose weight, she must consume less and increase activity. Her  is preparing her meals using portioning. The snacking is mostly at night.  Suggested some non carb sources to eat. Positive reinforcement provided.     Update to care planning 06/21/2023:  Patient seen today for f/u education. She needs to lose abut 14 lbs to qualify for knee surgery.  That is her greatest concern at this visit.  We reviewed food intake.  She denies snacking in between meals at this time.  This has  "been a struggle for Ms. Mac for some time.  She is taking a protein shake with fruit at breakfast, 2 pm (lunch) 1/2 sandwich and 4 oz juice; dinner glass of milk and 4 george cracker square.  We discussed how to improve on her diet.  Suggested adding an egg at breakfast and to change the fruit juice to a piece of fruit as this will be more satisfying.  At dinner suggested a portion of protein and non starchy veggies instead of the milk and crackers.  She is motivated to change as the surgery is important to her.  Suggested keeping carbs at about 30 gram per meal.       Problem: Blood Glucose Self-Monitoring         Long-Range Goal: Patient agrees to check and record blood sugars using the Dexcom G6 system with     Start Date: 6/21/2023   Expected End Date: 10/24/2023   Priority: Low   Barriers: Knowledge deficit   Note:    DEXCOM G6 CGM TRAINING     Patient referred to clinic today for Dexcom G6 continuous glucose sensor system training.  Patient arrived with  fully charged aEducation was provided using "Quick Start Guide" per Dexcom protocol.     Pt will be using her  as the primary .  Overview:  5min glucose reading updates, trending arrows, BG graph screens, battery life indicator, Blue Tooth Symbol.  Menus: trend Graph, start sensor, enter BG, events, Alerts, Settings, Shutdown, Stop Sensor   Settings:                          * Urgent Low: 55 mg/dL                          * Urgent Low Soon: on                          * Low alert: 80 mg/dl                           * High alert: 250 mg/dl                           * Rise rate: off                          * Fall Rate: off                          * Signal Loss: on repeat 20 min                          * No Reading: on repeat 20 min                          * Always sound: on                          * Alert Schedule:  (instructed on how to set up alert schedule if desired)     Reviewed additional setting options with " patient, including Graph Height and Transmitter ID. Transmitter was paired with .    Reviewed where to find sensor insertion time and sensor expiration date.   Discussed no need to calibrate sensor during the entirety of the 10 day wear. Discussed that pt can calibrate sensor if desired, but at that time she will need to continue calibrating every 12 hours for sensor to remain accurate. Reviewed appropriate calibration techniques.  Reviewed sensor site selection. Site selected and prepped using aseptic technique Inserted to abdomin. Transmitter placed in pod and secured.  Practiced sensor pod/transmitter removal from site, and removal of transmitter from sensor pod.  Patient able to demonstrate without difficulty.  Encouraged to review manual prior to starting another sensor.   Reviewed problem solving aspects of sensor transmission/ variables that can disrupt RF transmission.  range 20 feet, but the first 3 hrs keep within 3 feet of transmitter.  Pt instructed on lag time of interstitial fluid from CBG and was advised to tx hypoglycemia and dose insulin based on SMBG values.  Dexcom technical support contact number given and examples of when to contact them discussed. Pt will download software at home for Dexcom download.   Patient advised to always check glucose with glucometer should readings do not match symptoms felt (ex. Hypo or hyperglycemia).             Task: Reviewed the importance of self-monitoring blood glucose and keeping logs. Completed 6/212023            Follow Up Plan     F/u on 10/24/2023 to review goals.  Provided with Dexcom support number and advised to call for any issues.    Today's care plan and follow up schedule was discussed with patient.  Annika verbalized understanding of the care plan, goals, and agrees to follow up plan.        The patient was encouraged to communicate with his/her health care provider/physician and care team regarding his/her condition(s) and treatment.   I provided the patient with my contact information today and encouraged to contact me via phone or [facility] Patient Portal as needed.     Length of Visit   Total Time: 60 Minutes

## 2023-06-22 NOTE — PROGRESS NOTES
"CHW - Follow Up    This Community Health Worker completed a follow up visit with patient via telephone today.    Pt/Caregiver reported: conversation is via portal pt responded "Thank You" at 12:55 am     Community Health Worker provided: list of apartments in pt's area via portal    Follow up required: yes    Follow-up Outreach - Due: 6/26/2023       "

## 2023-06-25 ENCOUNTER — HOSPITAL ENCOUNTER (OUTPATIENT)
Dept: SLEEP MEDICINE | Facility: HOSPITAL | Age: 73
Discharge: HOME | End: 2023-06-25
Attending: INTERNAL MEDICINE
Payer: MEDICARE

## 2023-06-25 DIAGNOSIS — G47.33 OSA (OBSTRUCTIVE SLEEP APNEA): ICD-10-CM

## 2023-06-25 DIAGNOSIS — G47.8 UPPER AIRWAY RESISTANCE SYNDROME: Primary | ICD-10-CM

## 2023-06-25 PROCEDURE — 95810 POLYSOM 6/> YRS 4/> PARAM: CPT

## 2023-06-25 NOTE — Clinical Note
DIAGNOSIS: Upper airway resistance syndrome,   RECOMMENDATIONS:  1. Weight loss 2. ENT evaluation for Snoring 3. Positional therapy for Snoring or dental mandibular device fashioned by dental provider

## 2023-06-29 ENCOUNTER — OFFICE VISIT (OUTPATIENT)
Dept: PODIATRY | Facility: CLINIC | Age: 73
End: 2023-06-29
Payer: MEDICARE

## 2023-06-29 VITALS — BODY MASS INDEX: 48.12 KG/M2 | HEIGHT: 65 IN | WEIGHT: 288.81 LBS

## 2023-06-29 DIAGNOSIS — E11.42 DIABETIC POLYNEUROPATHY ASSOCIATED WITH TYPE 2 DIABETES MELLITUS: Primary | ICD-10-CM

## 2023-06-29 DIAGNOSIS — B35.1 ONYCHOMYCOSIS DUE TO DERMATOPHYTE: ICD-10-CM

## 2023-06-29 PROCEDURE — 11721 PR DEBRIDEMENT OF NAILS, 6 OR MORE: ICD-10-PCS | Mod: Q9,S$GLB,, | Performed by: PODIATRIST

## 2023-06-29 PROCEDURE — 99499 UNLISTED E&M SERVICE: CPT | Mod: S$GLB,,, | Performed by: PODIATRIST

## 2023-06-29 PROCEDURE — 99499 NO LOS: ICD-10-PCS | Mod: S$GLB,,, | Performed by: PODIATRIST

## 2023-06-29 PROCEDURE — 11721 DEBRIDE NAIL 6 OR MORE: CPT | Mod: Q9,S$GLB,, | Performed by: PODIATRIST

## 2023-06-29 PROCEDURE — 99999 PR PBB SHADOW E&M-EST. PATIENT-LVL IV: CPT | Mod: PBBFAC,,, | Performed by: PODIATRIST

## 2023-06-29 PROCEDURE — 99999 PR PBB SHADOW E&M-EST. PATIENT-LVL IV: ICD-10-PCS | Mod: PBBFAC,,, | Performed by: PODIATRIST

## 2023-06-30 PROCEDURE — 95810 POLYSOM 6/> YRS 4/> PARAM: CPT | Mod: 26,,, | Performed by: INTERNAL MEDICINE

## 2023-06-30 PROCEDURE — 95810 PR POLYSOMNOGRAPHY, 4 OR MORE: ICD-10-PCS | Mod: 26,,, | Performed by: INTERNAL MEDICINE

## 2023-06-30 NOTE — PROCEDURES
"Patient Name: Annika Mac Study Date: 6/25/2023   YOB: 1950 Study Type: PSG   Age: 72 year Patient #: [id number]   Sex: Female Billing ID: -   Height: 5' 5" Interpreting Physician: Trent Leon MD   Weight: 288.0 lbs Recording Tech: Abel Melchor   BMI: 48.0 Scoring Tech: Asmita Jaquez       PATIENT: Annika Mac  study Date: 6/25/2023  Referring Physician: Trent Leon MD    Indications for Polysomnography: The patient is a 72  year old Female who is 5' 5" and weighs 288.0 lbs.  Her BMI equals 48.0.  A full night polysomnogram was performed to evaluate for -.RUFINA, STOPBANG score 5.   CPAP take away, Weight dropped from 340 lb tp 288lb.  PSG reviewed: Moderate to severe RUFINA,   CPAP returned last year  Snoring, apnea, DTS  Comorbidity: BMI, DM, Hx of stroke, HTN  Split night study 11/2013 showed AHI 23.7 that was reduced to 3.3 at 8 cm H20.   She was on CPAP for about a year.   She felt like her air pressure was not set correctly.   PSG 2/2021 AHI 12.3  PSG 9/2021 AHI 6.3 supine    Polysomnogram Data:  A full night polysomnogram recorded the standard physiologic parameters including EEG, EOG, EMG, EKG, nasal and oral airflow.  Respiratory parameters of chest and abdominal movements were recorded with Pe[place]-Crystal motion transducers.  Oxygen saturation was recorded by pulse oximetry.      Sleep Architecture:  The total recording time of the polysomnogram was 455.4 minutes.  The total sleep time was 420.5 minutes.  The patient spent 3.4% of total sleep time in Stage N1, 49.3% in Stage N2, 18.2% in Stages N3 and N, and 29.0% in REM.   Sleep latency was 14.3 minutes.  REM latency was 65.0 minutes.  Sleep Efficiency was 92.3%.  Sleep Maintenance Efficiency was 95.2%.  Total wake time was 35.5 minutes for a total wake percentage of 4.8%.    Respiratory Events:  The polysomnogram revealed a presence of 1 obstructive, 2 central, and - mixed apneas resulting in an Apnea index of 0.4 events per hour.  " "There were 28 hypopneas (using 3% desaturation criteria) resulting in a Hypopnea index of 4.0 events per hour.  The combined Apnea/Hypopnea index (using 3% desaturation criteria for Hypopneas) was 4.4 events per hour.    Baseline oxygen saturation was 95.6%.  The lowest oxygen saturation was 88.0%.      LIMB ACTIVITY:  There were - limb movements recorded.  Of this total, - were classified as PLMs.  Of the PLMs, - were associated with arousals.  The Limb Movement index was - per hour while the PLM index was - per hour.    CARDIAC SUMMARY:   The average pulse rate was 73.2 bpm.  The minimum pulse rate was 64.0 bpm while the maximum pulse rate was 100.0 bpm.            CONCLUSION:  Overall AHI was 4.4/hr with 31 events. REM AHI was 9.3/hr  SpO2 alfa was 88.0%  Sleep efficiency was 92.3%  REM latency was 65 minutes  Moderate Snoring    DIAGNOSIS: Upper airway resistance syndrome,     RECOMMENDATIONS:    Weight loss  ENT evaluation for Snoring  Positional therapy for Snoring or dental mandibular device fashioned by dental provider    Dear Trent Leon MD  [id number] [facility]  [place] [id number]/Mamie Cotton MD         The sleep study that you ordered is complete.  You have ordered sleep LAB services to perform the sleep study for Annika Mac .      Please find Sleep Study result in  the "Media tab" of Chart Review menu.        You can look  for the report in the  Media by the document type "Sleep Study Documents". Alphabetizing  "Document type" column helps to find the SLEEP STUDY report  Faster.       As the ordering provider, you are responsible for reviewing the results and implementing a treatment plan with your patient.    If you need a Sleep Medicine provider to explain the sleep study findings and arrange treatment for the patient, please refer patient for consultation to our Sleep Clinic via [facility] with Ambulatory Consult Sleep.     To do that please place an order for an  "Ambulatory " "Consult Sleep" -  order , it will go to our clinic work queue for our staff  to contact the patient for an appointment.      For any questions, please contact our sleep lab  staff at [phone] to talk to clinical staff          Trent Leon MD   "

## 2023-07-01 DIAGNOSIS — E11.42 TYPE 2 DIABETES MELLITUS WITH DIABETIC POLYNEUROPATHY, WITH LONG-TERM CURRENT USE OF INSULIN: ICD-10-CM

## 2023-07-01 DIAGNOSIS — Z79.4 TYPE 2 DIABETES MELLITUS WITH DIABETIC POLYNEUROPATHY, WITH LONG-TERM CURRENT USE OF INSULIN: ICD-10-CM

## 2023-07-03 RX ORDER — PREGABALIN 150 MG/1
CAPSULE ORAL
Qty: 60 CAPSULE | Refills: 5 | Status: SHIPPED | OUTPATIENT
Start: 2023-07-03 | End: 2024-01-16

## 2023-07-05 ENCOUNTER — PATIENT OUTREACH (OUTPATIENT)
Dept: ADMINISTRATIVE | Facility: OTHER | Age: 73
End: 2023-07-05
Payer: MEDICARE

## 2023-07-05 NOTE — PROGRESS NOTES
CHW - Case Closure    This Community Health Worker spoke to patient via telephone today.     Pt/Caregiver reported: pt stated she hasn't heard back from any apartments CHW sent via portal CHW explained housing is very difficult to obtain and pt stated she rather CHW send resources to her po box on file     CHW mailed list sent via portal to pt's po box    Pt/Caregiver denied any additional needs at this time and agrees with episode closure at this time.  Provided patient with Community Health Worker's contact information and encouraged him/her to contact this Community Health Worker if additional needs arise.

## 2023-07-27 ENCOUNTER — CLINICAL SUPPORT (OUTPATIENT)
Dept: PULMONOLOGY | Facility: CLINIC | Age: 73
End: 2023-07-27
Payer: MEDICARE

## 2023-07-27 ENCOUNTER — OFFICE VISIT (OUTPATIENT)
Dept: SLEEP MEDICINE | Facility: CLINIC | Age: 73
End: 2023-07-27
Payer: MEDICARE

## 2023-07-27 VITALS
BODY MASS INDEX: 48.6 KG/M2 | HEIGHT: 65 IN | SYSTOLIC BLOOD PRESSURE: 122 MMHG | RESPIRATION RATE: 17 BRPM | DIASTOLIC BLOOD PRESSURE: 76 MMHG | HEART RATE: 72 BPM | OXYGEN SATURATION: 99 % | WEIGHT: 291.69 LBS

## 2023-07-27 DIAGNOSIS — E78.5 DYSLIPIDEMIA ASSOCIATED WITH TYPE 2 DIABETES MELLITUS: ICD-10-CM

## 2023-07-27 DIAGNOSIS — J45.20 MILD INTERMITTENT ASTHMA WITHOUT COMPLICATION: ICD-10-CM

## 2023-07-27 DIAGNOSIS — E11.69 DYSLIPIDEMIA ASSOCIATED WITH TYPE 2 DIABETES MELLITUS: ICD-10-CM

## 2023-07-27 DIAGNOSIS — G47.33 OSA (OBSTRUCTIVE SLEEP APNEA): Primary | ICD-10-CM

## 2023-07-27 DIAGNOSIS — I10 ESSENTIAL HYPERTENSION: ICD-10-CM

## 2023-07-27 DIAGNOSIS — E11.3513 TYPE 2 DIABETES MELLITUS WITH BOTH EYES AFFECTED BY PROLIFERATIVE RETINOPATHY AND MACULAR EDEMA, WITH LONG-TERM CURRENT USE OF INSULIN: ICD-10-CM

## 2023-07-27 DIAGNOSIS — Z79.4 TYPE 2 DIABETES MELLITUS WITH BOTH EYES AFFECTED BY PROLIFERATIVE RETINOPATHY AND MACULAR EDEMA, WITH LONG-TERM CURRENT USE OF INSULIN: ICD-10-CM

## 2023-07-27 LAB
BRPFT: NORMAL
FEF 25 75 CHG: 61.3 %
FEF 25 75 LLN: 0.57
FEF 25 75 POST REF: 192.1 %
FEF 25 75 PRE REF: 119.1 %
FEF 25 75 REF: 1.58
FET100 CHG: -48.3 %
FEV1 CHG: -5.7 %
FEV1 FVC CHG: 20.6 %
FEV1 FVC LLN: 65
FEV1 FVC POST REF: 119.2 %
FEV1 FVC PRE REF: 98.9 %
FEV1 FVC REF: 78
FEV1 LLN: 1.29
FEV1 POST REF: 107.3 %
FEV1 PRE REF: 113.7 %
FEV1 REF: 1.87
FVC CHG: -21.8 %
FVC LLN: 1.69
FVC POST REF: 89.2 %
FVC PRE REF: 114.1 %
FVC REF: 2.42
PEF CHG: -11.6 %
PEF LLN: 2.68
PEF POST REF: 109.7 %
PEF PRE REF: 124.1 %
PEF REF: 4.75
POST FEF 25 75: 3.03 L/S
POST FET 100: 3.49 SEC
POST FEV1 FVC: 93.01 %
POST FEV1: 2.01 L
POST FVC: 2.16 L
POST PEF: 5.21 L/S
PRE FEF 25 75: 1.88 L/S
PRE FET 100: 6.74 SEC
PRE FEV1 FVC: 77.12 %
PRE FEV1: 2.13 L
PRE FVC: 2.76 L
PRE PEF: 5.89 L/S

## 2023-07-27 PROCEDURE — 1101F PR PT FALLS ASSESS DOC 0-1 FALLS W/OUT INJ PAST YR: ICD-10-PCS | Mod: CPTII,S$GLB,, | Performed by: INTERNAL MEDICINE

## 2023-07-27 PROCEDURE — 3078F DIAST BP <80 MM HG: CPT | Mod: CPTII,S$GLB,, | Performed by: INTERNAL MEDICINE

## 2023-07-27 PROCEDURE — 3051F PR MOST RECENT HEMOGLOBIN A1C LEVEL 7.0 - < 8.0%: ICD-10-PCS | Mod: CPTII,S$GLB,, | Performed by: INTERNAL MEDICINE

## 2023-07-27 PROCEDURE — 3008F BODY MASS INDEX DOCD: CPT | Mod: CPTII,S$GLB,, | Performed by: INTERNAL MEDICINE

## 2023-07-27 PROCEDURE — 1159F MED LIST DOCD IN RCRD: CPT | Mod: CPTII,S$GLB,, | Performed by: INTERNAL MEDICINE

## 2023-07-27 PROCEDURE — 3074F SYST BP LT 130 MM HG: CPT | Mod: CPTII,S$GLB,, | Performed by: INTERNAL MEDICINE

## 2023-07-27 PROCEDURE — 4010F ACE/ARB THERAPY RXD/TAKEN: CPT | Mod: CPTII,S$GLB,, | Performed by: INTERNAL MEDICINE

## 2023-07-27 PROCEDURE — 3051F HG A1C>EQUAL 7.0%<8.0%: CPT | Mod: CPTII,S$GLB,, | Performed by: INTERNAL MEDICINE

## 2023-07-27 PROCEDURE — 1160F PR REVIEW ALL MEDS BY PRESCRIBER/CLIN PHARMACIST DOCUMENTED: ICD-10-PCS | Mod: CPTII,S$GLB,, | Performed by: INTERNAL MEDICINE

## 2023-07-27 PROCEDURE — 3066F PR DOCUMENTATION OF TREATMENT FOR NEPHROPATHY: ICD-10-PCS | Mod: CPTII,S$GLB,, | Performed by: INTERNAL MEDICINE

## 2023-07-27 PROCEDURE — 3288F FALL RISK ASSESSMENT DOCD: CPT | Mod: CPTII,S$GLB,, | Performed by: INTERNAL MEDICINE

## 2023-07-27 PROCEDURE — 99999 PR PBB SHADOW E&M-EST. PATIENT-LVL V: ICD-10-PCS | Mod: PBBFAC,,, | Performed by: INTERNAL MEDICINE

## 2023-07-27 PROCEDURE — 99999 PR PBB SHADOW E&M-EST. PATIENT-LVL V: CPT | Mod: PBBFAC,,, | Performed by: INTERNAL MEDICINE

## 2023-07-27 PROCEDURE — 1101F PT FALLS ASSESS-DOCD LE1/YR: CPT | Mod: CPTII,S$GLB,, | Performed by: INTERNAL MEDICINE

## 2023-07-27 PROCEDURE — 3074F PR MOST RECENT SYSTOLIC BLOOD PRESSURE < 130 MM HG: ICD-10-PCS | Mod: CPTII,S$GLB,, | Performed by: INTERNAL MEDICINE

## 2023-07-27 PROCEDURE — 3060F PR POS MICROALBUMINURIA RESULT DOCUMENTED/REVIEW: ICD-10-PCS | Mod: CPTII,S$GLB,, | Performed by: INTERNAL MEDICINE

## 2023-07-27 PROCEDURE — 3288F PR FALLS RISK ASSESSMENT DOCUMENTED: ICD-10-PCS | Mod: CPTII,S$GLB,, | Performed by: INTERNAL MEDICINE

## 2023-07-27 PROCEDURE — 94060 EVALUATION OF WHEEZING: CPT | Mod: S$GLB,,, | Performed by: INTERNAL MEDICINE

## 2023-07-27 PROCEDURE — 3078F PR MOST RECENT DIASTOLIC BLOOD PRESSURE < 80 MM HG: ICD-10-PCS | Mod: CPTII,S$GLB,, | Performed by: INTERNAL MEDICINE

## 2023-07-27 PROCEDURE — 3060F POS MICROALBUMINURIA REV: CPT | Mod: CPTII,S$GLB,, | Performed by: INTERNAL MEDICINE

## 2023-07-27 PROCEDURE — 99214 OFFICE O/P EST MOD 30 MIN: CPT | Mod: 25,S$GLB,, | Performed by: INTERNAL MEDICINE

## 2023-07-27 PROCEDURE — 4010F PR ACE/ARB THEARPY RXD/TAKEN: ICD-10-PCS | Mod: CPTII,S$GLB,, | Performed by: INTERNAL MEDICINE

## 2023-07-27 PROCEDURE — 94060 PR EVAL OF BRONCHOSPASM: ICD-10-PCS | Mod: S$GLB,,, | Performed by: INTERNAL MEDICINE

## 2023-07-27 PROCEDURE — 3008F PR BODY MASS INDEX (BMI) DOCUMENTED: ICD-10-PCS | Mod: CPTII,S$GLB,, | Performed by: INTERNAL MEDICINE

## 2023-07-27 PROCEDURE — 99214 PR OFFICE/OUTPT VISIT, EST, LEVL IV, 30-39 MIN: ICD-10-PCS | Mod: 25,S$GLB,, | Performed by: INTERNAL MEDICINE

## 2023-07-27 PROCEDURE — 1160F RVW MEDS BY RX/DR IN RCRD: CPT | Mod: CPTII,S$GLB,, | Performed by: INTERNAL MEDICINE

## 2023-07-27 PROCEDURE — 3066F NEPHROPATHY DOC TX: CPT | Mod: CPTII,S$GLB,, | Performed by: INTERNAL MEDICINE

## 2023-07-27 PROCEDURE — 1159F PR MEDICATION LIST DOCUMENTED IN MEDICAL RECORD: ICD-10-PCS | Mod: CPTII,S$GLB,, | Performed by: INTERNAL MEDICINE

## 2023-07-27 PROCEDURE — 99499 UNLISTED E&M SERVICE: CPT | Mod: S$GLB,,, | Performed by: INTERNAL MEDICINE

## 2023-07-27 NOTE — ASSESSMENT & PLAN NOTE
Known Moderate RUFINA  Abandoned PAP therapy   Seeks restart  PSG ordered: AHI was 4.4/hr    Repeat HSAT

## 2023-07-27 NOTE — PROGRESS NOTES
Pulmonary Outpatient  Visit     Subjective:       Patient ID: Annika Mac is a 72 y.o. female.    Social History     Tobacco Use   Smoking Status Never   Smokeless Tobacco Never            Chief Complaint: Sleep Apnea      Annika Mac is 72 y.o.  Referred by Avril Bianchi, [facility]  [address] [place] [address]   Seen Ms Ferraro last year  Per PCP note April 2023: not using consistently  CPAP take away  PSG reviewed: Moderate to severe RUFINA,   CPAP returned last year  Snoring, apnea, DTS  Comorbidity: BMI, DM, Hx of stroke, HTN  Also carried Dx Asthma: PRN inhaler  No prior spirometry  CXR was reviewed  Never smoker  FeNo was 7       Split night study 11/2013 showed AHI 23.7 that was reduced to 3.3 at 8 cm H20.   She was on CPAP for about a year.   She felt like her air pressure was not set correctly.   PSG 2/2021 AHI 12.3  PSG 9/2021 AHI 6.3 supine    07/27/2023  Here with daughter:  PSG: AHI 4.4/hr  Suspicion high will repeat HSAT  Spiroemtry was normal    CONCLUSION:  1. Overall AHI was 4.4/hr with 31 events. REM AHI was 9.3/hr  2. SpO2 alfa was 88.0%  3. Sleep efficiency was 92.3%  4. REM latency was 65 minutes  5. Moderate Snoring     DIAGNOSIS: Upper airway resistance syndrome,      RECOMMENDATIONS:     1. Weight loss  2. ENT evaluation for Snoring  3. Positional therapy for Snoring or dental mandibular device fashioned by dental provider             Review of Systems   Constitutional:  Positive for fatigue.   Respiratory:  Positive for apnea, snoring and somnolence.    All other systems reviewed and are negative.    Outpatient Encounter Medications as of 7/27/2023   Medication Sig Dispense Refill    albuterol (PROAIR HFA) 90 mcg/actuation inhaler Inhale 2 puffs into the lungs every 6 (six) hours as needed for Wheezing. Rescue 18 g 6    amLODIPine (NORVASC) 10 MG tablet TAKE 1 TABLET BY MOUTH ONCE  DAILY 90 tablet 0    aspirin 81 MG Chew Take 1  "tablet (81 mg total) by mouth once daily.  0    atorvastatin (LIPITOR) 20 MG tablet Take 1 tablet (20 mg total) by mouth once daily. 90 tablet 3    BD ULTRA-FINE KIKA PEN NEEDLE 32 gauge x 5/32" Ndle To use 4 times daily 200 each 20    blood sugar diagnostic Strp 1 strip 4 times daily. Contour Next. 200 strip 11    blood-glucose meter kit 1 each by Other route once daily. Use daily. Insurance proffered one touch  E11.42 1 each 0    blood-glucose meter,continuous (DEXCOM G6 ) Misc 1 device to monitor blood glucose with dexcom 1 each 0    blood-glucose sensor (DEXCOM G6 SENSOR) Terese Use 1 device, exchange every 10 days 3 each 11    blood-glucose sensor Terese Affix 1 device to skin every 10 days 3 each 11    blood-glucose transmitter (DEXCOM G6 TRANSMITTER) Terese 1 Device by Misc.(Non-Drug; Combo Route) route every 3 (three) months. 1 each 3    chlorthalidone (HYGROTEN) 25 MG Tab Take 1 tablet (25 mg total) by mouth once daily. 30 tablet 1    CONSTULOSE 10 gram/15 mL solution SMARTSI Milliliter(s) By Mouth Twice Daily PRN      dulaglutide (TRULICITY) 4.5 mg/0.5 mL pen injector Inject 4.5 mg into the skin every 7 days. 12 pen 3    DULoxetine (CYMBALTA) 30 MG capsule TAKE 1 CAPSULE BY MOUTH  ONCE DAILY 90 capsule 3    empagliflozin (JARDIANCE) 10 mg tablet Take 1 tablet (10 mg total) by mouth once daily. 90 tablet 3    famotidine (PEPCID) 20 MG tablet Take 1 tablet (20 mg total) by mouth 2 (two) times daily. 1 tablet 0    finasteride (PROSCAR) 5 mg tablet Take 1 tablet (5 mg total) by mouth once daily. 30 tablet 11    glucagon (GVOKE HYPOPEN 1-PACK) 0.5 mg/0.1 mL AtIn Inject 1 Dose into the skin daily as needed (for severe hypoglycemia if you aren't able to treat by ingesting sugar). 1 Syringe 3    HUMALOG KWIKPEN INSULIN 100 unit/mL pen INJECT 11 UNITS INTO THE  SKIN 3 TIMES DAILY BEFORE  MEALS PLUS SLIDING SCALE -  MAX TOTAL DAILY DOSE 63  UNITS 30 mL 6    insulin degludec (TRESIBA FLEXTOUCH U-100) 100 " unit/mL (3 mL) insulin pen Inject 40 Units into the skin once daily. 15 pen 3    irbesartan (AVAPRO) 300 MG tablet TAKE 1 TABLET BY MOUTH IN  THE EVENING 90 tablet 3    ketoconazole (NIZORAL) 2 % shampoo Wash hair with medicated shampoo at least 2x/week - let sit on scalp at least 5 minutes prior to rinsing 120 mL 5    labetaloL (NORMODYNE) 300 MG tablet Take 1 tablet (300 mg total) by mouth 2 (two) times daily. 180 tablet 3    lancets 31 gauge Misc 1 lancet by Misc.(Non-Drug; Combo Route) route 4 (four) times daily. E11.42 . Prescribed one touch 200 each 6    multivitamin (THERAGRAN) per tablet Take 1 tablet by mouth once daily.      pregabalin (LYRICA) 150 MG capsule TAKE 1 CAPSULE BY MOUTH TWICE  DAILY 60 capsule 5    sodium bicarbonate 325 MG tablet Take 2 tablets (650 mg total) by mouth 2 (two) times daily. 120 tablet 11    [DISCONTINUED] insulin glargine, TOUJEO, (TOUJEO SOLOSTAR) 300 unit/mL (1.5 mL) InPn pen INJECT 40 UNITS INTO THE SKIN EVERY EVENING TITRATE UP AS DIRECTED. 4.5 mL 0     Facility-Administered Encounter Medications as of 7/27/2023   Medication Dose Route Frequency Provider Last Rate Last Admin    fentaNYL injection 25 mcg  25 mcg Intravenous Q5 Min PRN Desmond Fontana MD   50 mcg at 01/08/21 0955    midazolam (VERSED) 1 mg/mL injection 0.5 mg  0.5 mg Intravenous PRN Desmond Fontana MD   2 mg at 01/08/21 0955    triamcinolone acetonide injection 10 mg  10 mg Intradermal Once Susie Curran MD        triamcinolone acetonide injection 10 mg  10 mg Intradermal Once Susie Curran MD           The following portions of the patient's history were reviewed and updated as appropriate: She  has a past medical history of Asthma, Chronic obstructive pulmonary disease, unspecified COPD type (1/14/2022), Constipation (10/3/2019), Deep vein thrombophlebitis of left leg (7/27/2012), Deep vein thrombosis, Diabetic foot ulcer with osteomyelitis, Encounter for blood transfusion,  GERD (gastroesophageal reflux disease), Obesity, diabetes, and hypertension syndrome (2019), Obstructive sleep apnea (2012), PAD (peripheral artery disease) (2013), Pressure ulcer of toe of left foot, Type 2 diabetes mellitus with obesity (2020), and Type 2 diabetes mellitus with peripheral artery disease (2020).  She does not have any pertinent problems on file.  She  has a past surgical history that includes Total knee arthroplasty (2006); Total abdominal hysterectomy (); Ganglion cyst excision (2007); Cyst Removal (2011); Trigger finger release (2009);  section; Cataract extraction w/  intraocular lens implant (Left, 3/2002); Cataract extraction w/  intraocular lens implant (Right, ); Pan Retinal Photocoagulation (Bilateral); Eye surgery (Bilateral, ); Colonoscopy (N/A, 2018); Cystoscopy w/ retrogrades (Left, 2020); Joint replacement (Left); Interposition arthroplasty of carpometacarpal joints (Left, 2021); De Quervain's release (2021); Injection of facet joint (Bilateral, 2021); Injection of anesthetic agent around medial branch nerves innervating lumbar facet joint (Bilateral, 2022); Injection of anesthetic agent around medial branch nerves innervating lumbar facet joint (Bilateral, 2022); Radiofrequency thermocoagulation (Right, 2022); Radiofrequency thermocoagulation (Left, 2022); Esophagogastroduodenoscopy (N/A, 2023); and Colonoscopy (N/A, 2023).  Her family history includes Diabetes in her brother, mother, and sister; Heart disease in her father; Hypertension in her brother and mother; No Known Problems in her brother, daughter, daughter, sister, and son; Thyroid disease in her brother.  She  reports that she has never smoked. She has never used smokeless tobacco. She reports that she does not drink alcohol and does not use drugs.  She has a current medication list which includes the  "following prescription(s): albuterol, amlodipine, aspirin, atorvastatin, bd ultra-fine myesha pen needle, blood sugar diagnostic, blood-glucose meter, dexcom g6 , dexcom g6 sensor, blood-glucose sensor, dexcom g6 transmitter, chlorthalidone, constulose, trulicity, duloxetine, empagliflozin, famotidine, finasteride, gvoke hypopen 1-pack, humalog kwikpen insulin, insulin degludec, irbesartan, ketoconazole, labetalol, lancets, multivitamin, pregabalin, sodium bicarbonate, and [DISCONTINUED] insulin glargine (toujeo), and the following Facility-Administered Medications: fentanyl, midazolam, triamcinolone acetonide, and triamcinolone acetonide.  Current Outpatient Medications on File Prior to Visit   Medication Sig Dispense Refill    albuterol (PROAIR HFA) 90 mcg/actuation inhaler Inhale 2 puffs into the lungs every 6 (six) hours as needed for Wheezing. Rescue 18 g 6    amLODIPine (NORVASC) 10 MG tablet TAKE 1 TABLET BY MOUTH ONCE  DAILY 90 tablet 0    aspirin 81 MG Chew Take 1 tablet (81 mg total) by mouth once daily.  0    atorvastatin (LIPITOR) 20 MG tablet Take 1 tablet (20 mg total) by mouth once daily. 90 tablet 3    BD ULTRA-FINE MYESHA PEN NEEDLE 32 gauge x 5/32" Ndle To use 4 times daily 200 each 20    blood sugar diagnostic Strp 1 strip 4 times daily. Contour Next. 200 strip 11    blood-glucose meter kit 1 each by Other route once daily. Use daily. Insurance proffered one touch  E11.42 1 each 0    blood-glucose meter,continuous (DEXCOM G6 ) Misc 1 device to monitor blood glucose with dexcom 1 each 0    blood-glucose sensor (DEXCOM G6 SENSOR) Terese Use 1 device, exchange every 10 days 3 each 11    blood-glucose sensor Terese Affix 1 device to skin every 10 days 3 each 11    blood-glucose transmitter (DEXCOM G6 TRANSMITTER) Terese 1 Device by Misc.(Non-Drug; Combo Route) route every 3 (three) months. 1 each 3    chlorthalidone (HYGROTEN) 25 MG Tab Take 1 tablet (25 mg total) by mouth once daily. 30 tablet " 1    CONSTULOSE 10 gram/15 mL solution SMARTSI Milliliter(s) By Mouth Twice Daily PRN      dulaglutide (TRULICITY) 4.5 mg/0.5 mL pen injector Inject 4.5 mg into the skin every 7 days. 12 pen 3    DULoxetine (CYMBALTA) 30 MG capsule TAKE 1 CAPSULE BY MOUTH  ONCE DAILY 90 capsule 3    empagliflozin (JARDIANCE) 10 mg tablet Take 1 tablet (10 mg total) by mouth once daily. 90 tablet 3    famotidine (PEPCID) 20 MG tablet Take 1 tablet (20 mg total) by mouth 2 (two) times daily. 1 tablet 0    finasteride (PROSCAR) 5 mg tablet Take 1 tablet (5 mg total) by mouth once daily. 30 tablet 11    glucagon (GVOKE HYPOPEN 1-PACK) 0.5 mg/0.1 mL AtIn Inject 1 Dose into the skin daily as needed (for severe hypoglycemia if you aren't able to treat by ingesting sugar). 1 Syringe 3    HUMALOG KWIKPEN INSULIN 100 unit/mL pen INJECT 11 UNITS INTO THE  SKIN 3 TIMES DAILY BEFORE  MEALS PLUS SLIDING SCALE -  MAX TOTAL DAILY DOSE 63  UNITS 30 mL 6    insulin degludec (TRESIBA FLEXTOUCH U-100) 100 unit/mL (3 mL) insulin pen Inject 40 Units into the skin once daily. 15 pen 3    irbesartan (AVAPRO) 300 MG tablet TAKE 1 TABLET BY MOUTH IN  THE EVENING 90 tablet 3    ketoconazole (NIZORAL) 2 % shampoo Wash hair with medicated shampoo at least 2x/week - let sit on scalp at least 5 minutes prior to rinsing 120 mL 5    labetaloL (NORMODYNE) 300 MG tablet Take 1 tablet (300 mg total) by mouth 2 (two) times daily. 180 tablet 3    lancets 31 gauge Misc 1 lancet by Misc.(Non-Drug; Combo Route) route 4 (four) times daily. E11.42 . Prescribed one touch 200 each 6    multivitamin (THERAGRAN) per tablet Take 1 tablet by mouth once daily.      pregabalin (LYRICA) 150 MG capsule TAKE 1 CAPSULE BY MOUTH TWICE  DAILY 60 capsule 5    sodium bicarbonate 325 MG tablet Take 2 tablets (650 mg total) by mouth 2 (two) times daily. 120 tablet 11    [DISCONTINUED] insulin glargine, TOUJEO, (TOUJEO SOLOSTAR) 300 unit/mL (1.5 mL) InPn pen INJECT 40 UNITS INTO THE SKIN  EVERY EVENING TITRATE UP AS DIRECTED. 4.5 mL 0     Current Facility-Administered Medications on File Prior to Visit   Medication Dose Route Frequency Provider Last Rate Last Admin    fentaNYL injection 25 mcg  25 mcg Intravenous Q5 Min PRN Desmond Fontana MD   50 mcg at 01/08/21 0955    midazolam (VERSED) 1 mg/mL injection 0.5 mg  0.5 mg Intravenous PRN Desmond Fontana MD   2 mg at 01/08/21 0955    triamcinolone acetonide injection 10 mg  10 mg Intradermal Once Susie Curran MD        triamcinolone acetonide injection 10 mg  10 mg Intradermal Once Susie Curran MD         She is allergic to clindamycin and iodinated contrast media..      BP Readings from Last 3 Encounters:   07/27/23 122/76   06/15/23 124/72   06/07/23 130/80     Snoring / Sleep:     Tow Questionnaire (validated RUFINA screening questionnaire)    YES -- Snoring/apnea    YES -- Fatigue    Body mass index is 48.54 kg/m².  (>25 is overweight, >30 is obese)    Blood Pressure = Hypertension  (PreHTN 120-139/80-89, Stg1 140-159/90-99, Stg2 >160/>100)  Tow = 3 of three RUFINA categories are positive (high risk is 2-3 positive categories)     [place] Sleepiness Scale TOTAL =       EPWORTH SLEEPINESS SCALE 7/27/2023   Sitting and reading 0   Watching TV 1   Sitting, inactive in a public place (e.g. a theatre or a meeting) 0   As a passenger in a car for an hour without a break 0   Lying down to rest in the afternoon when circumstances permit 1   Sitting and talking to someone 0   Sitting quietly after a lunch without alcohol 2   In a car, while stopped for a few minutes in traffic 0   Total score 4         (validated sleepiness questionnaire with a higher score indicating greater sleepiness; range 0-24)  No flowsheet data found.    STOP-Bang Questionnaire (validated RUFINA screening questionnaire)  Negative unless checked off.  [x] Snoring    [x]  Tired/Fatigued/Sleepy  [x] Obstruction (apneas/choking)  [x] Pressure  "(HTN)  [] BMI >35  [x] Age >50  [] Neck >40 cm  [] Gender male   STOP-Bang = 5 (low risk 0-2,high risk 3-8)    Neck circumference 16"        MMRC Dyspnea Scale (4 is worst)     [] MMRC 0: Dyspneic on strenuous excercise (0 points)    [x] MMRC 1: Dyspneic on walking a slight hill (0 points)    [] MMRC 2: Dyspneic on walking level ground; must stop occasionally due to breathlessness (1 point)    [] MMRC 3: Must stop for breathlessness after walking 100 yards or after a few minutes (2 points)    [] MMRC 4: Cannot leave house; breathless on dressing/undressing (3 points)       Asthma Control Test  In the past 4  weeks, how much of the time did your asthma keep you from getting as much done at work, school or at home?: A little of the time  During the past 4 weeks, how often have you had shortness of breath?: Once or twice a week  During the past 4 weeks, how often did your asthma symptoms (wheezing, couging, shortness of breath, chest tightness or pain) wake you up at night or earlier than usual in the morning?: Not at all  During the past 4 weeks, how often have you used your rescue inhaler or nebulizer medication (such as albuterol)?: Once a week or less  How would you rate your asthma control during the past 4 weeks?: Somewhat controlled  If your score is 19 or less, your asthma may not be under control: 20             No flowsheet data found.              Objective:     Vital Signs (Most Recent)  Vital Signs  Pulse: 72  Resp: 17  SpO2: 99 %  BP: 122/76  Height and Weight  Height: 5' 5" (165.1 cm)  Weight: 132.3 kg (291 lb 10.7 oz)  BSA (Calculated - sq m): 2.46 sq meters  BMI (Calculated): 48.5  Weight in (lb) to have BMI = 25: 149.9]  Wt Readings from Last 2 Encounters:   07/27/23 132.3 kg (291 lb 10.7 oz)   06/29/23 131 kg (288 lb 12.8 oz)       Physical Exam  Vitals and nursing note reviewed.   Constitutional:       Appearance: She is normal weight.   HENT:      Head: Normocephalic and atraumatic.      Comments: " Malampati score 4     Nose: Nose normal.   Eyes:      Pupils: Pupils are equal, round, and reactive to light.   Cardiovascular:      Rate and Rhythm: Normal rate and regular rhythm.      Pulses: Normal pulses.      Heart sounds: Normal heart sounds.   Pulmonary:      Effort: Pulmonary effort is normal.      Breath sounds: Normal breath sounds.   Abdominal:      General: Bowel sounds are normal.      Palpations: Abdomen is soft.   Musculoskeletal:      Cervical back: Normal range of motion.   Skin:     General: Skin is warm.   Neurological:      General: No focal deficit present.      Mental Status: She is alert and oriented to person, place, and time.   Psychiatric:         Mood and Affect: Mood normal.        Laboratory  Lab Results   Component Value Date    WBC 5.60 05/16/2023    RBC 3.49 (L) 05/16/2023    HGB 9.8 (L) 05/16/2023    HCT 31.5 (L) 05/16/2023    MCV 90 05/16/2023    MCH 28.1 05/16/2023    MCHC 31.1 (L) 05/16/2023    RDW 14.7 (H) 05/16/2023     05/16/2023    MPV 10.9 05/16/2023    GRAN 2.8 05/16/2023    GRAN 49.7 05/16/2023    LYMPH 1.9 05/16/2023    LYMPH 34.6 05/16/2023    MONO 0.6 05/16/2023    MONO 10.0 05/16/2023    EOS 0.3 05/16/2023    BASO 0.03 05/16/2023    EOSINOPHIL 4.8 05/16/2023    BASOPHIL 0.5 05/16/2023       BMP  Lab Results   Component Value Date     05/16/2023    K 4.2 05/16/2023     05/16/2023    CO2 22 (L) 05/16/2023    BUN 42 (H) 05/16/2023    CREATININE 1.6 (H) 05/16/2023    CALCIUM 10.1 05/16/2023    ANIONGAP 11 05/16/2023    ESTGFRAFRICA 29.9 (A) 06/13/2022    EGFRNONAA 26.0 (A) 06/13/2022    AST 29 05/16/2023    ALT 24 05/16/2023    PROT 6.4 05/16/2023          No results found for: IGE     No results found for: ASPERGILLUS  No results found for: AFUMIGATUSCL     No results found for: ACE     Diagnostic Results:  I have personally reviewed today the following studies:          Patient Name: DEWEY [facility] #:  [id number]    Sex:  Female  Study Date:   "2013    :  1950  Clinic #:  [id number]    Age:  62  Referring Physician:  Juanita Santana MD    Height:  65.0 in  Referring Physician #  [id number]    Weight:  340.0 lbs  Sleep Specialist:  Juanita Santana MD    B.M.I.:  56.6  Sleep Specialist #  [id number]    Hypopnea rule:  4B  Scoring Tech:  NERY Sheehan    Diagnostic AHI:  23.7  Recording Tech  Leanett Sandifer, RRT    Lowest O2 sat:  87.0%  Recording Location:  [facility]      Patient Name: Annika Mac Study Date: 2023   YOB: 1950 Study Type: PSG   Age: 72 year Patient #: [id number]   Sex: Female Billing ID: -   Height: 5' 5" Interpreting Physician: Trent Leon MD   Weight: 288.0 lbs Recording Tech: Abel Melchor   BMI: 48.0 Scoring Tech: Asmita Jaquez       PATIENT: Annika Mac  study Date: 2023  Referring Physician: Trent Leon MD    Indications for Polysomnography: The patient is a 72  year old Female who is 5' 5" and weighs 288.0 lbs.  Her BMI equals 48.0.  A full night polysomnogram was performed to evaluate for -.RUFINA, STOPBANG score 5.   CPAP take away, Weight dropped from 340 lb tp 288lb.  PSG reviewed: Moderate to severe RUFINA,   CPAP returned last year  Snoring, apnea, DTS  Comorbidity: BMI, DM, Hx of stroke, HTN  Split night study 2013 showed AHI 23.7 that was reduced to 3.3 at 8 cm H20.   She was on CPAP for about a year.   She felt like her air pressure was not set correctly.   PSG 2021 AHI 12.3  PSG 2021 AHI 6.3 supine    Polysomnogram Data:  A full night polysomnogram recorded the standard physiologic parameters including EEG, EOG, EMG, EKG, nasal and oral airflow.  Respiratory parameters of chest and abdominal movements were recorded with [place]-Crystal motion transducers.  Oxygen saturation was recorded by pulse oximetry.      Sleep Architecture:  The total recording time of the polysomnogram was 455.4 minutes.  The total sleep time was 420.5 minutes.  The patient spent 3.4% of total sleep " time in Stage N1, 49.3% in Stage N2, 18.2% in Stages N3 and N, and 29.0% in REM.   Sleep latency was 14.3 minutes.  REM latency was 65.0 minutes.  Sleep Efficiency was 92.3%.  Sleep Maintenance Efficiency was 95.2%.  Total wake time was 35.5 minutes for a total wake percentage of 4.8%.    Respiratory Events:  The polysomnogram revealed a presence of 1 obstructive, 2 central, and - mixed apneas resulting in an Apnea index of 0.4 events per hour.  There were 28 hypopneas (using 3% desaturation criteria) resulting in a Hypopnea index of 4.0 events per hour.  The combined Apnea/Hypopnea index (using 3% desaturation criteria for Hypopneas) was 4.4 events per hour.    Baseline oxygen saturation was 95.6%.  The lowest oxygen saturation was 88.0%.      LIMB ACTIVITY:  There were - limb movements recorded.  Of this total, - were classified as PLMs.  Of the PLMs, - were associated with arousals.  The Limb Movement index was - per hour while the PLM index was - per hour.    CARDIAC SUMMARY:   The average pulse rate was 73.2 bpm.  The minimum pulse rate was 64.0 bpm while the maximum pulse rate was 100.0 bpm.            CONCLUSION:  Overall AHI was 4.4/hr with 31 events. REM AHI was 9.3/hr  SpO2 alfa was 88.0%  Sleep efficiency was 92.3%  REM latency was 65 minutes  Moderate Snoring    DIAGNOSIS: Upper airway resistance syndrome,     RECOMMENDATIONS:    Weight loss  ENT evaluation for Snoring  Positional therapy for Snoring or dental mandibular device fashioned by dental provider    Spirometry  FEV1: 2.13L( 113.7%), FVC 2.76L( 114.1%)  FEV1/FVC 77    Assessment/Plan:     Problem List Items Addressed This Visit       Type 2 diabetes mellitus with both eyes affected by proliferative retinopathy and macular edema, with long-term current use of insulin    Essential hypertension    Dyslipidemia associated with type 2 diabetes mellitus    RUFINA (obstructive sleep apnea) - Primary     Known Moderate RUFINA  Abandoned PAP therapy   Seeks  restart  PSG ordered: AHI was 4.4/hr    Repeat HSAT         Relevant Orders    Home Sleep Study      My recommendation at this point would be to set up a HOME SLEEP TEST or INLAB POLYSOMNOGRAPHY  through [facility] ( [phone].    We have discussed weight loss , non supine position, good sleep hygiene, and  other interventions to foster good natural healthy sleep.  We have discussed behavioral modifications, as well.  After her study, she  could certainly try PAP therapy or out suitable alternatives       Study was borderline RDI  Will repeat HSAT    Spirometry was normal     Follow up in about 6 weeks (around 9/7/2023), or HSAT, weight loss.    This note was prepared using voice recognition system and is likely to have sound alike errors that may have been overlooked even after proof reading.  Please call me with any questions    Discussed diagnosis, its evaluation, treatment and usual course. All questions answered.    Thank you for the courtesy of participating in the care of this patient    Trent Leon MD      Personal Diagnostic Review  [x]  CXR    [x]  Sleep study    [x]  echo    []  6MWD    []  LABS    []  CHEST CT    []  PET CT    []  Biopsy results

## 2023-08-02 DIAGNOSIS — E11.42 TYPE 2 DIABETES MELLITUS WITH DIABETIC POLYNEUROPATHY, WITH LONG-TERM CURRENT USE OF INSULIN: ICD-10-CM

## 2023-08-02 DIAGNOSIS — Z79.4 TYPE 2 DIABETES MELLITUS WITH DIABETIC POLYNEUROPATHY, WITH LONG-TERM CURRENT USE OF INSULIN: ICD-10-CM

## 2023-08-03 RX ORDER — INSULIN DEGLUDEC 100 U/ML
40 INJECTION, SOLUTION SUBCUTANEOUS
Qty: 15 ML | Refills: 8 | Status: SHIPPED | OUTPATIENT
Start: 2023-08-03 | End: 2024-03-05 | Stop reason: SDUPTHER

## 2023-08-11 DIAGNOSIS — Z79.4 TYPE 2 DIABETES MELLITUS WITH DIABETIC POLYNEUROPATHY, WITH LONG-TERM CURRENT USE OF INSULIN: ICD-10-CM

## 2023-08-11 DIAGNOSIS — E11.42 TYPE 2 DIABETES MELLITUS WITH DIABETIC POLYNEUROPATHY, WITH LONG-TERM CURRENT USE OF INSULIN: ICD-10-CM

## 2023-08-14 RX ORDER — PEN NEEDLE, DIABETIC 31 GX5/16"
NEEDLE, DISPOSABLE MISCELLANEOUS
Qty: 200 EACH | Refills: 20 | Status: SHIPPED | OUTPATIENT
Start: 2023-08-14

## 2023-08-30 ENCOUNTER — PATIENT MESSAGE (OUTPATIENT)
Dept: DIABETES | Facility: CLINIC | Age: 73
End: 2023-08-30
Payer: COMMERCIAL

## 2023-08-30 RX ORDER — BLOOD-GLUCOSE,RECEIVER,CONT
EACH MISCELLANEOUS
Qty: 1 EACH | Refills: 0 | Status: SHIPPED | OUTPATIENT
Start: 2023-08-30 | End: 2023-10-30

## 2023-08-31 ENCOUNTER — PATIENT MESSAGE (OUTPATIENT)
Dept: ENDOCRINOLOGY | Facility: CLINIC | Age: 73
End: 2023-08-31
Payer: COMMERCIAL

## 2023-09-04 DIAGNOSIS — I10 ESSENTIAL HYPERTENSION: ICD-10-CM

## 2023-09-05 RX ORDER — AMLODIPINE BESYLATE 10 MG/1
TABLET ORAL
Qty: 90 TABLET | Refills: 3 | Status: SHIPPED | OUTPATIENT
Start: 2023-09-05

## 2023-09-05 NOTE — TELEPHONE ENCOUNTER
Care Due:                  Date            Visit Type   Department     Provider  --------------------------------------------------------------------------------                                EP -                              PRIMARY      [facility] INTERNAL  Mamie Mora  Last Visit: 09-      CARE ([facility])   MEDICINE       Y[place]  Next Visit: None Scheduled  None         None Found                                                            Last  Test          Frequency    Reason                     Performed    Due Date  --------------------------------------------------------------------------------    Office Visit  12 months..  famotidine, irbesartan...  09- 09-    [facility] Embedded Care Due Messages. Reference number: [id number].   9/04/2023 10:10:34 PM CDT

## 2023-09-06 ENCOUNTER — HOSPITAL ENCOUNTER (OUTPATIENT)
Dept: SLEEP MEDICINE | Facility: HOSPITAL | Age: 73
Discharge: HOME | End: 2023-09-06
Attending: INTERNAL MEDICINE
Payer: MEDICARE

## 2023-09-06 DIAGNOSIS — G47.33 OSA (OBSTRUCTIVE SLEEP APNEA): ICD-10-CM

## 2023-09-06 PROCEDURE — 95800 PR SLEEP STUDY, UNATTENDED, RECORD HEART RATE/O2 SAT/RESP ANAL/SLEEP TIME: ICD-10-PCS | Mod: 26,,, | Performed by: INTERNAL MEDICINE

## 2023-09-06 PROCEDURE — 95800 SLP STDY UNATTENDED: CPT | Mod: 26,,, | Performed by: INTERNAL MEDICINE

## 2023-09-06 PROCEDURE — 95806 SLEEP STUDY UNATT&RESP EFFT: CPT | Performed by: INTERNAL MEDICINE

## 2023-09-06 NOTE — Clinical Note
PHYSICIAN INTERPRETATION AND COMMENTS: Findings are consistent with mild, non-positional obstructive sleep apnea(RUFINA). Therapy with CPAP indicated CLINICAL HISTORY: 72 year old female presented with: 15.25 inch neck, BMI of 46.6, an [place] sleepiness score of 11, history of hypertension, history of stroke, diabetes and symptoms of nocturnal snoring, waking up choking and witnessed apneas. Based on the clinical history, the patient has a high pre-test probability of having Moderate RUFINA.

## 2023-09-06 NOTE — PROCEDURES
PHYSICIAN INTERPRETATION AND COMMENTS: Findings are consistent with mild, non-positional obstructive sleep  apnea(RUFINA). Therapy with CPAP indicated  CLINICAL HISTORY: 72 year old female presented with: 15.25 inch neck, BMI of 46.6, an [place] sleepiness score of 11,  history of hypertension, history of stroke, diabetes and symptoms of nocturnal snoring, waking up choking and witnessed  apneas. Based on the clinical history, the patient has a high pre-test probability of having Moderate RUFINA.  SLEEP STUDY FINDINGS: Patient underwent a 3 night Home Sleep Test and by behavioral criteria, slept for approximately  19.22 hours, with a sleep latency of 6 minutes and a sleep efficiency of 93.3%. Mild sleep disordered breathing (AHI=8) is  noted based on a 4% hypopnea desaturation criteria. The patient slept supine 12.2% of the night based on valid recording  time of 19.55 hours. When considering more subtle measures of sleep disordered breathing, the overall respiratory  disturbance index is mild(RDI=16) based on a 1% hypopnea desaturation criteria with confirmation by surrogate arousal  indicators. The apneas/hypopneas are accompanied by minimal oxygen desaturation (percent time below 90% SpO2: 0%,  Min SpO2: 84.3%). The average desaturation across all sleep disordered breathing events is 2.2%. Snoring occurs for 8.2%  (30 dB) of the study, 3.8% is very loud. The mean pulse rate is 71.9 BPM, with infrequent pulse rate variability (23 events  with >= 6 BPM increase/decrease per hour).  TREATMENT CONSIDERATIONS: Consider nasal continuous positive airway pressure (CPAP/AutoPAP) as the initial  treatment choice based on the AHI severity, daytime somnolence and co-morbidities. A mandibular advancement splint  (MAS) or referral to an ENT surgeon for modification to the airway should be considered to reduce daytime somnolence  and the potential contribution of RUFINA on existing diseases if the patient prefers an alternative therapy  "or the CPAP trial is  unsuccessful.      Dear Trent Leon MD  [id number] [address] [facility]  [place] [id number]/Mamie Cotton MD         The sleep study that you ordered is complete.  You have ordered sleep LAB services to perform the sleep study for Annika Mac .      Please find Sleep Study result in  the "Media tab" of Chart Review menu.        You can look  for the report in the  Media by the document type "Sleep Study Documents". Alphabetizing  "Document type" column helps to find the SLEEP STUDY report  Faster.       As the ordering provider, you are responsible for reviewing the results and implementing a treatment plan with your patient.    If you need a Sleep Medicine provider to explain the sleep study findings and arrange treatment for the patient, please refer patient for consultation to our Sleep Clinic via [facility] with Ambulatory Consult Sleep.     To do that please place an order for an  "Ambulatory Consult Sleep" -  order , it will go to our clinic work queue for our staff  to contact the patient for an appointment.      For any questions, please contact our sleep lab  staff at [phone] to talk to clinical staff          Trent Leon MD   "

## 2023-09-18 NOTE — PROGRESS NOTES
Subjective:      Patient ID: Annika Mac is a 72 y.o. female.    Chief Complaint: Diabetes Mellitus      Annika is a 72 y.o. female who presents to the clinic for evaluation and treatment of high risk feet. Annika has a past medical history of Asthma, Chronic obstructive pulmonary disease, unspecified COPD type (1/14/2022), Constipation (10/3/2019), Deep vein thrombophlebitis of left leg (7/27/2012), Deep vein thrombosis, Diabetic foot ulcer with osteomyelitis, Encounter for blood transfusion, GERD (gastroesophageal reflux disease), Obesity, diabetes, and hypertension syndrome (2/13/2019), Obstructive sleep apnea (7/27/2012), PAD (peripheral artery disease) (11/21/2013), Pressure ulcer of toe of left foot, Type 2 diabetes mellitus with obesity (8/19/2020), and Type 2 diabetes mellitus with peripheral artery disease (8/19/2020). The patient's chief complaint is long, thick toenails. This patient has documented high risk feet requiring routine maintenance secondary to peripheral neuropathy.    PCP: Mamie Cotton MD    Date Last Seen by PCP:   Chief Complaint   Patient presents with    Diabetes Mellitus         Current shoe gear:  Affected Foot: Rx diabetic extra depth shoes and custom accommodative insoles     Unaffected Foot: Rx diabetic extra depth shoes and custom accommodative insoles    Hemoglobin A1C   Date Value Ref Range Status   01/05/2023 7.7 (H) 4.0 - 5.6 % Final     Comment:     ADA Screening Guidelines:  5.7-6.4%  Consistent with prediabetes  >or=6.5%  Consistent with diabetes    High levels of fetal hemoglobin interfere with the HbA1C  assay. Heterozygous hemoglobin variants (HbS, HgC, etc)do  not significantly interfere with this assay.   However, presence of multiple variants may affect accuracy.     08/24/2022 7.4 (H) 4.0 - 5.6 % Final     Comment:     ADA Screening Guidelines:  5.7-6.4%  Consistent with prediabetes  >or=6.5%  Consistent with diabetes    High levels of fetal hemoglobin  interfere with the HbA1C  assay. Heterozygous hemoglobin variants (HbS, HgC, etc)do  not significantly interfere with this assay.   However, presence of multiple variants may affect accuracy.     04/11/2022 8.1 (H) 4.0 - 5.6 % Final     Comment:     ADA Screening Guidelines:  5.7-6.4%  Consistent with prediabetes  >or=6.5%  Consistent with diabetes    High levels of fetal hemoglobin interfere with the HbA1C  assay. Heterozygous hemoglobin variants (HbS, HgC, etc)do  not significantly interfere with this assay.   However, presence of multiple variants may affect accuracy.         Review of Systems   Constitutional: Negative for chills, fever and malaise/fatigue.   HENT:  Negative for hearing loss.    Cardiovascular:  Positive for leg swelling. Negative for claudication.   Respiratory:  Negative for shortness of breath.    Skin:  Positive for color change, dry skin, nail changes and unusual hair distribution. Negative for flushing and rash.   Musculoskeletal:  Negative for joint pain and myalgias.   Neurological:  Positive for numbness, paresthesias and sensory change. Negative for loss of balance.   Psychiatric/Behavioral:  Negative for altered mental status.            Objective:      Physical Exam  Vitals reviewed.   Constitutional:       Appearance: She is well-developed.   Cardiovascular:      Pulses:           Dorsalis pedis pulses are 0 on the right side and 0 on the left side.        Posterior tibial pulses are 1+ on the right side and 1+ on the left side.   Musculoskeletal:      Right lower leg: Edema present.      Left lower leg: Edema present.      Right ankle: Decreased range of motion. Abnormal pulse.      Right Achilles Tendon: Patel's test negative.      Left ankle: Decreased range of motion. Abnormal pulse.      Left Achilles Tendon: Patel's test negative.      Right foot: Decreased range of motion.      Left foot: Decreased range of motion.   Feet:      Right foot:      Protective Sensation: 5  sites tested.  2 sites sensed.      Left foot:      Protective Sensation: 5 sites tested.  2 sites sensed.   Skin:     General: Skin is warm and dry.      Capillary Refill: Capillary refill takes more than 3 seconds.      Comments: Nails x10 are elongated by  4-7mm's, thickened by 2-3 mm's, dystrophic, and are yellowish in  coloration . Xerosis Bilaterally. No open lesions noted.    Neurological:      Mental Status: She is alert.      Comments: diminished sensation noted to b/L lower extremities   Psychiatric:         Behavior: Behavior normal. Behavior is cooperative.               Assessment:       Encounter Diagnoses   Name Primary?    Diabetic polyneuropathy associated with type 2 diabetes mellitus Yes    Onychomycosis due to dermatophyte          Plan:       Annika was seen today for diabetes mellitus.    Diagnoses and all orders for this visit:    Diabetic polyneuropathy associated with type 2 diabetes mellitus    Onychomycosis due to dermatophyte      I counseled the patient on her conditions, their implications and medical management.    Pt advised to wear compression stockings for lower extremity swelling.     - Shoe inspection. Diabetic Foot Education. Patient reminded of the importance of good nutrition and blood sugar control to help prevent podiatric complications of diabetes. Patient instructed on proper foot hygeine. We discussed wearing proper shoe gear, daily foot inspections, never walking without protective shoe gear, never putting sharp instruments to feet, routine podiatric nail visits every 2-3 months.      - With patient's permission, nails were aggressively reduced and debrided x 10 to their soft tissue attachment mechanically and with electric , removing all offending nail and debris. Patient relates relief following the procedure. She will continue to monitor the areas daily, inspect her feet, wear protective shoe gear when ambulatory, moisturizer to maintain skin integrity and follow  in this office in approximately 2-3 months, sooner p.r.n.

## 2023-09-20 ENCOUNTER — TELEPHONE (OUTPATIENT)
Dept: INTERNAL MEDICINE | Facility: CLINIC | Age: 73
End: 2023-09-20
Payer: COMMERCIAL

## 2023-09-20 DIAGNOSIS — Z12.31 ENCOUNTER FOR SCREENING MAMMOGRAM FOR BREAST CANCER: Primary | ICD-10-CM

## 2023-09-20 NOTE — TELEPHONE ENCOUNTER
----- Message from Sakshi Ambrocio sent at 9/20/2023  9:16 AM CDT -----  Regarding: Mammo Order  Contact: Pt [phone]  Pt is calling to get a mammo but needs a order put in please call

## 2023-09-20 NOTE — TELEPHONE ENCOUNTER
----- Message from Sakshi Ambrocio sent at 9/20/2023  9:16 AM CDT -----  Regarding: Mammo Order  Contact: Pt [phone]  Pt is calling to get a mammo but needs a order put in please call

## 2023-09-21 ENCOUNTER — OFFICE VISIT (OUTPATIENT)
Dept: SLEEP MEDICINE | Facility: CLINIC | Age: 73
End: 2023-09-21
Payer: MEDICARE

## 2023-09-21 VITALS
HEART RATE: 83 BPM | OXYGEN SATURATION: 95 % | SYSTOLIC BLOOD PRESSURE: 134 MMHG | BODY MASS INDEX: 48.08 KG/M2 | WEIGHT: 288.56 LBS | HEIGHT: 65 IN | RESPIRATION RATE: 20 BRPM | DIASTOLIC BLOOD PRESSURE: 62 MMHG

## 2023-09-21 DIAGNOSIS — J45.20 MILD INTERMITTENT ASTHMA WITHOUT COMPLICATION: ICD-10-CM

## 2023-09-21 DIAGNOSIS — G47.33 OSA (OBSTRUCTIVE SLEEP APNEA): Primary | ICD-10-CM

## 2023-09-21 DIAGNOSIS — I10 ESSENTIAL HYPERTENSION: ICD-10-CM

## 2023-09-21 DIAGNOSIS — E11.3513 TYPE 2 DIABETES MELLITUS WITH BOTH EYES AFFECTED BY PROLIFERATIVE RETINOPATHY AND MACULAR EDEMA, WITH LONG-TERM CURRENT USE OF INSULIN: ICD-10-CM

## 2023-09-21 DIAGNOSIS — Z79.4 TYPE 2 DIABETES MELLITUS WITH BOTH EYES AFFECTED BY PROLIFERATIVE RETINOPATHY AND MACULAR EDEMA, WITH LONG-TERM CURRENT USE OF INSULIN: ICD-10-CM

## 2023-09-21 PROCEDURE — 3288F PR FALLS RISK ASSESSMENT DOCUMENTED: ICD-10-PCS | Mod: CPTII,S$GLB,, | Performed by: INTERNAL MEDICINE

## 2023-09-21 PROCEDURE — 3066F PR DOCUMENTATION OF TREATMENT FOR NEPHROPATHY: ICD-10-PCS | Mod: CPTII,S$GLB,, | Performed by: INTERNAL MEDICINE

## 2023-09-21 PROCEDURE — 3060F POS MICROALBUMINURIA REV: CPT | Mod: CPTII,S$GLB,, | Performed by: INTERNAL MEDICINE

## 2023-09-21 PROCEDURE — 3075F PR MOST RECENT SYSTOLIC BLOOD PRESS GE 130-139MM HG: ICD-10-PCS | Mod: CPTII,S$GLB,, | Performed by: INTERNAL MEDICINE

## 2023-09-21 PROCEDURE — 99214 PR OFFICE/OUTPT VISIT, EST, LEVL IV, 30-39 MIN: ICD-10-PCS | Mod: S$GLB,,, | Performed by: INTERNAL MEDICINE

## 2023-09-21 PROCEDURE — 4010F PR ACE/ARB THEARPY RXD/TAKEN: ICD-10-PCS | Mod: CPTII,S$GLB,, | Performed by: INTERNAL MEDICINE

## 2023-09-21 PROCEDURE — 3051F HG A1C>EQUAL 7.0%<8.0%: CPT | Mod: CPTII,S$GLB,, | Performed by: INTERNAL MEDICINE

## 2023-09-21 PROCEDURE — 1159F PR MEDICATION LIST DOCUMENTED IN MEDICAL RECORD: ICD-10-PCS | Mod: CPTII,S$GLB,, | Performed by: INTERNAL MEDICINE

## 2023-09-21 PROCEDURE — 1160F PR REVIEW ALL MEDS BY PRESCRIBER/CLIN PHARMACIST DOCUMENTED: ICD-10-PCS | Mod: CPTII,S$GLB,, | Performed by: INTERNAL MEDICINE

## 2023-09-21 PROCEDURE — 3288F FALL RISK ASSESSMENT DOCD: CPT | Mod: CPTII,S$GLB,, | Performed by: INTERNAL MEDICINE

## 2023-09-21 PROCEDURE — 99999 PR PBB SHADOW E&M-EST. PATIENT-LVL V: CPT | Mod: PBBFAC,,, | Performed by: INTERNAL MEDICINE

## 2023-09-21 PROCEDURE — 1125F AMNT PAIN NOTED PAIN PRSNT: CPT | Mod: CPTII,S$GLB,, | Performed by: INTERNAL MEDICINE

## 2023-09-21 PROCEDURE — 4010F ACE/ARB THERAPY RXD/TAKEN: CPT | Mod: CPTII,S$GLB,, | Performed by: INTERNAL MEDICINE

## 2023-09-21 PROCEDURE — 1125F PR PAIN SEVERITY QUANTIFIED, PAIN PRESENT: ICD-10-PCS | Mod: CPTII,S$GLB,, | Performed by: INTERNAL MEDICINE

## 2023-09-21 PROCEDURE — 3008F BODY MASS INDEX DOCD: CPT | Mod: CPTII,S$GLB,, | Performed by: INTERNAL MEDICINE

## 2023-09-21 PROCEDURE — 1101F PT FALLS ASSESS-DOCD LE1/YR: CPT | Mod: CPTII,S$GLB,, | Performed by: INTERNAL MEDICINE

## 2023-09-21 PROCEDURE — 3075F SYST BP GE 130 - 139MM HG: CPT | Mod: CPTII,S$GLB,, | Performed by: INTERNAL MEDICINE

## 2023-09-21 PROCEDURE — 3066F NEPHROPATHY DOC TX: CPT | Mod: CPTII,S$GLB,, | Performed by: INTERNAL MEDICINE

## 2023-09-21 PROCEDURE — 1159F MED LIST DOCD IN RCRD: CPT | Mod: CPTII,S$GLB,, | Performed by: INTERNAL MEDICINE

## 2023-09-21 PROCEDURE — 3051F PR MOST RECENT HEMOGLOBIN A1C LEVEL 7.0 - < 8.0%: ICD-10-PCS | Mod: CPTII,S$GLB,, | Performed by: INTERNAL MEDICINE

## 2023-09-21 PROCEDURE — 99999 PR PBB SHADOW E&M-EST. PATIENT-LVL V: ICD-10-PCS | Mod: PBBFAC,,, | Performed by: INTERNAL MEDICINE

## 2023-09-21 PROCEDURE — 3078F DIAST BP <80 MM HG: CPT | Mod: CPTII,S$GLB,, | Performed by: INTERNAL MEDICINE

## 2023-09-21 PROCEDURE — 3060F PR POS MICROALBUMINURIA RESULT DOCUMENTED/REVIEW: ICD-10-PCS | Mod: CPTII,S$GLB,, | Performed by: INTERNAL MEDICINE

## 2023-09-21 PROCEDURE — 3008F PR BODY MASS INDEX (BMI) DOCUMENTED: ICD-10-PCS | Mod: CPTII,S$GLB,, | Performed by: INTERNAL MEDICINE

## 2023-09-21 PROCEDURE — 99214 OFFICE O/P EST MOD 30 MIN: CPT | Mod: S$GLB,,, | Performed by: INTERNAL MEDICINE

## 2023-09-21 PROCEDURE — 1101F PR PT FALLS ASSESS DOC 0-1 FALLS W/OUT INJ PAST YR: ICD-10-PCS | Mod: CPTII,S$GLB,, | Performed by: INTERNAL MEDICINE

## 2023-09-21 PROCEDURE — 3078F PR MOST RECENT DIASTOLIC BLOOD PRESSURE < 80 MM HG: ICD-10-PCS | Mod: CPTII,S$GLB,, | Performed by: INTERNAL MEDICINE

## 2023-09-21 PROCEDURE — 1160F RVW MEDS BY RX/DR IN RCRD: CPT | Mod: CPTII,S$GLB,, | Performed by: INTERNAL MEDICINE

## 2023-09-21 NOTE — PATIENT INSTRUCTIONS
We are starting you on PAP therapy in order to treat your obstructive sleep apnea. In my experience, the best ways to use this therapy is to minimize the irritation and maximize the daytime benefit. Please do the following:    -Use the PAP machine whenever you are sleeping at night, or during naps    -If you have some trouble with the machine, please call your home care company the next day: Your homecare company is either [employer]: refer to sticker on device    -If they are not able to solve your problem, please call me, and I will see what I can do. I can be reached at [phone], through my nurse, Lidia    -If you share the bed with someone, ask them to be on the lookout for you snoring or stopping breathing, with the machine on. If you do, please let me know before our next appointment. If you had these complaints, prior to use of PAP, you will still have them on nights where you are not using the machine    -Finally, in order to verify that you are getting a adequate amount of time on the PAP machine I have requested that your home care company download information from it. This is typically done by removing a card from your machine and taking it by the homecare company. They should contact you several weeks before our next appointment to get this information. It is very important that we have this information before our next visit, in fact it is required by medicare or your insurance for your CPAP to be paid for, and unfortunately we do not have a  here in the office so you will need to get the card to them at least several days before our appointment.

## 2023-09-21 NOTE — PROGRESS NOTES
Pulmonary Outpatient  Visit     Subjective:       Patient ID: Annika Mac is a 72 y.o. female.    Social History     Tobacco Use   Smoking Status Never   Smokeless Tobacco Never            Chief Complaint: No chief complaint on file.      Annika Mac is 72 y.o.  Referred by Avril Bianchi, P  [id number] [place] [address]   Seen Ms Ferraro last year  Per PCP note April 2023: not using consistently  CPAP take away  PSG reviewed: Moderate to severe RUFINA,   CPAP returned last year  Snoring, apnea, DTS  Comorbidity: BMI, DM, Hx of stroke, HTN  Also carried Dx Asthma: PRN inhaler  No prior spirometry  CXR was reviewed  Never smoker  FeNo was 7       Split night study 11/2013 showed AHI 23.7 that was reduced to 3.3 at 8 cm H20.   She was on CPAP for about a year.   She felt like her air pressure was not set correctly.   PSG 2/2021 AHI 12.3  PSG 9/2021 AHI 6.3 supine    07/27/2023  Here with daughter:  PSG: AHI 4.4/hr  Suspicion high will repeat HSAT  Spiroemtry was normal    CONCLUSION:  1. Overall AHI was 4.4/hr with 31 events. REM AHI was 9.3/hr  2. SpO2 alfa was 88.0%  3. Sleep efficiency was 92.3%  4. REM latency was 65 minutes  5. Moderate Snoring     DIAGNOSIS: Upper airway resistance syndrome,      RECOMMENDATIONS:     1. Weight loss  2. ENT evaluation for Snoring  3. Positional therapy for Snoring or dental mandibular device fashioned by dental provider    09/21/2023   Follow-up appointment   Reviewed sleep study results   Positive for mild obstructive sleep apnea   Goals for CPAP discussed   CPAP will be ordered               Review of Systems   Constitutional:  Positive for fatigue.   Respiratory:  Positive for apnea, snoring and somnolence.    All other systems reviewed and are negative.      Outpatient Encounter Medications as of 9/21/2023   Medication Sig Dispense Refill    albuterol (PROAIR HFA) 90 mcg/actuation inhaler Inhale 2 puffs into  "the lungs every 6 (six) hours as needed for Wheezing. Rescue 18 g 6    amLODIPine (NORVASC) 10 MG tablet TAKE 1 TABLET BY MOUTH ONCE  DAILY 90 tablet 3    aspirin 81 MG Chew Take 1 tablet (81 mg total) by mouth once daily.  0    atorvastatin (LIPITOR) 20 MG tablet Take 1 tablet (20 mg total) by mouth once daily. 90 tablet 3    BD ULTRA-FINE KIKA PEN NEEDLE 32 gauge x 5/32" Ndle USE 4 TIMES DAILY 200 each 20    blood sugar diagnostic Strp 1 strip 4 times daily. Contour Next. 200 strip 11    blood-glucose meter kit 1 each by Other route once daily. Use daily. Insurance proffered one touch  E11.42 1 each 0    blood-glucose meter,continuous (DEXCOM G6 ) Misc 1 device to monitor blood glucose with dexcom 1 each 0    blood-glucose sensor (DEXCOM G6 SENSOR) Terese Use 1 device, exchange every 10 days 3 each 11    blood-glucose sensor Terese Affix 1 device to skin every 10 days 3 each 11    blood-glucose transmitter (DEXCOM G6 TRANSMITTER) Terese 1 Device by Misc.(Non-Drug; Combo Route) route every 3 (three) months. 1 each 3    chlorthalidone (HYGROTEN) 25 MG Tab Take 1 tablet (25 mg total) by mouth once daily. 30 tablet 1    CONSTULOSE 10 gram/15 mL solution SMARTSI Milliliter(s) By Mouth Twice Daily PRN      dulaglutide (TRULICITY) 4.5 mg/0.5 mL pen injector Inject 4.5 mg into the skin every 7 days. 12 pen 3    DULoxetine (CYMBALTA) 30 MG capsule TAKE 1 CAPSULE BY MOUTH  ONCE DAILY 90 capsule 3    empagliflozin (JARDIANCE) 10 mg tablet Take 1 tablet (10 mg total) by mouth once daily. 90 tablet 3    finasteride (PROSCAR) 5 mg tablet Take 1 tablet (5 mg total) by mouth once daily. 30 tablet 11    glucagon (GVOKE HYPOPEN 1-PACK) 0.5 mg/0.1 mL AtIn Inject 1 Dose into the skin daily as needed (for severe hypoglycemia if you aren't able to treat by ingesting sugar). 1 Syringe 3    HUMALOG KWIKPEN INSULIN 100 unit/mL pen INJECT 11 UNITS INTO THE  SKIN 3 TIMES DAILY BEFORE  MEALS PLUS SLIDING SCALE -  MAX TOTAL DAILY DOSE " 63  UNITS 30 mL 6    irbesartan (AVAPRO) 300 MG tablet TAKE 1 TABLET BY MOUTH IN  THE EVENING 90 tablet 3    ketoconazole (NIZORAL) 2 % shampoo Wash hair with medicated shampoo at least 2x/week - let sit on scalp at least 5 minutes prior to rinsing 120 mL 5    labetaloL (NORMODYNE) 300 MG tablet Take 1 tablet (300 mg total) by mouth 2 (two) times daily. 180 tablet 3    lancets 31 gauge Misc 1 lancet by Misc.(Non-Drug; Combo Route) route 4 (four) times daily. E11.42 . Prescribed one touch 200 each 6    multivitamin (THERAGRAN) per tablet Take 1 tablet by mouth once daily.      pregabalin (LYRICA) 150 MG capsule TAKE 1 CAPSULE BY MOUTH TWICE  DAILY 60 capsule 5    TRESIBA FLEXTOUCH U-100 100 unit/mL (3 mL) insulin pen INJECT 40 UNITS INTO THE SKIN  ONCE DAILY 15 mL 8    famotidine (PEPCID) 20 MG tablet Take 1 tablet (20 mg total) by mouth 2 (two) times daily. 1 tablet 0    sodium bicarbonate 325 MG tablet Take 2 tablets (650 mg total) by mouth 2 (two) times daily. 120 tablet 11    [DISCONTINUED] insulin glargine, TOUJEO, (TOUJEO SOLOSTAR) 300 unit/mL (1.5 mL) InPn pen INJECT 40 UNITS INTO THE SKIN EVERY EVENING TITRATE UP AS DIRECTED. 4.5 mL 0     Facility-Administered Encounter Medications as of 9/21/2023   Medication Dose Route Frequency Provider Last Rate Last Admin    fentaNYL injection 25 mcg  25 mcg Intravenous Q5 Min PRN Desmond Fontana MD   50 mcg at 01/08/21 0955    midazolam (VERSED) 1 mg/mL injection 0.5 mg  0.5 mg Intravenous PRN Desmond Fontana MD   2 mg at 01/08/21 0955    triamcinolone acetonide injection 10 mg  10 mg Intradermal Once Susie Curran MD        triamcinolone acetonide injection 10 mg  10 mg Intradermal Once Susie Curran MD           The following portions of the patient's history were reviewed and updated as appropriate: She  has a past medical history of Asthma, Chronic obstructive pulmonary disease, unspecified COPD type (1/14/2022), Constipation  (10/3/2019), Deep vein thrombophlebitis of left leg (2012), Deep vein thrombosis, Diabetic foot ulcer with osteomyelitis, Encounter for blood transfusion, GERD (gastroesophageal reflux disease), Obesity, diabetes, and hypertension syndrome (2019), Obstructive sleep apnea (2012), PAD (peripheral artery disease) (2013), Pressure ulcer of toe of left foot, Type 2 diabetes mellitus with obesity (2020), and Type 2 diabetes mellitus with peripheral artery disease (2020).  She does not have any pertinent problems on file.  She  has a past surgical history that includes Total knee arthroplasty (2006); Total abdominal hysterectomy (); Ganglion cyst excision (2007); Cyst Removal (2011); Trigger finger release (2009);  section; Cataract extraction w/  intraocular lens implant (Left, 3/2002); Cataract extraction w/  intraocular lens implant (Right, ); Pan Retinal Photocoagulation (Bilateral); Eye surgery (Bilateral, ); Colonoscopy (N/A, 2018); Cystoscopy w/ retrogrades (Left, 2020); Joint replacement (Left); Interposition arthroplasty of carpometacarpal joints (Left, 2021); De Quervain's release (2021); Injection of facet joint (Bilateral, 2021); Injection of anesthetic agent around medial branch nerves innervating lumbar facet joint (Bilateral, 2022); Injection of anesthetic agent around medial branch nerves innervating lumbar facet joint (Bilateral, 2022); Radiofrequency thermocoagulation (Right, 2022); Radiofrequency thermocoagulation (Left, 2022); Esophagogastroduodenoscopy (N/A, 2023); and Colonoscopy (N/A, 2023).  Her family history includes Diabetes in her brother, mother, and sister; Heart disease in her father; Hypertension in her brother and mother; No Known Problems in her brother, daughter, daughter, sister, and son; Thyroid disease in her brother.  She  reports that she has never smoked. She has  "never used smokeless tobacco. She reports that she does not drink alcohol and does not use drugs.  She has a current medication list which includes the following prescription(s): albuterol, amlodipine, aspirin, atorvastatin, bd ultra-fine myesha pen needle, blood sugar diagnostic, blood-glucose meter, dexcom g6 , dexcom g6 sensor, blood-glucose sensor, dexcom g6 transmitter, chlorthalidone, constulose, trulicity, duloxetine, empagliflozin, finasteride, gvoke hypopen 1-pack, humalog kwikpen insulin, irbesartan, ketoconazole, labetalol, lancets, multivitamin, pregabalin, tresiba flextouch u-100, famotidine, sodium bicarbonate, and [DISCONTINUED] insulin glargine (toujeo), and the following Facility-Administered Medications: fentanyl, midazolam, triamcinolone acetonide, and triamcinolone acetonide.  Current Outpatient Medications on File Prior to Visit   Medication Sig Dispense Refill    albuterol (PROAIR HFA) 90 mcg/actuation inhaler Inhale 2 puffs into the lungs every 6 (six) hours as needed for Wheezing. Rescue 18 g 6    amLODIPine (NORVASC) 10 MG tablet TAKE 1 TABLET BY MOUTH ONCE  DAILY 90 tablet 3    aspirin 81 MG Chew Take 1 tablet (81 mg total) by mouth once daily.  0    atorvastatin (LIPITOR) 20 MG tablet Take 1 tablet (20 mg total) by mouth once daily. 90 tablet 3    BD ULTRA-FINE MYESHA PEN NEEDLE 32 gauge x 5/32" Ndle USE 4 TIMES DAILY 200 each 20    blood sugar diagnostic Strp 1 strip 4 times daily. Contour Next. 200 strip 11    blood-glucose meter kit 1 each by Other route once daily. Use daily. Insurance proffered one touch  E11.42 1 each 0    blood-glucose meter,continuous (DEXCOM G6 ) Misc 1 device to monitor blood glucose with dexcom 1 each 0    blood-glucose sensor (DEXCOM G6 SENSOR) Terese Use 1 device, exchange every 10 days 3 each 11    blood-glucose sensor Terese Affix 1 device to skin every 10 days 3 each 11    blood-glucose transmitter (DEXCOM G6 TRANSMITTER) Terese 1 Device by " Misc.(Non-Drug; Combo Route) route every 3 (three) months. 1 each 3    chlorthalidone (HYGROTEN) 25 MG Tab Take 1 tablet (25 mg total) by mouth once daily. 30 tablet 1    CONSTULOSE 10 gram/15 mL solution SMARTSI Milliliter(s) By Mouth Twice Daily PRN      dulaglutide (TRULICITY) 4.5 mg/0.5 mL pen injector Inject 4.5 mg into the skin every 7 days. 12 pen 3    DULoxetine (CYMBALTA) 30 MG capsule TAKE 1 CAPSULE BY MOUTH  ONCE DAILY 90 capsule 3    empagliflozin (JARDIANCE) 10 mg tablet Take 1 tablet (10 mg total) by mouth once daily. 90 tablet 3    finasteride (PROSCAR) 5 mg tablet Take 1 tablet (5 mg total) by mouth once daily. 30 tablet 11    glucagon (GVOKE HYPOPEN 1-PACK) 0.5 mg/0.1 mL AtIn Inject 1 Dose into the skin daily as needed (for severe hypoglycemia if you aren't able to treat by ingesting sugar). 1 Syringe 3    HUMALOG KWIKPEN INSULIN 100 unit/mL pen INJECT 11 UNITS INTO THE  SKIN 3 TIMES DAILY BEFORE  MEALS PLUS SLIDING SCALE -  MAX TOTAL DAILY DOSE 63  UNITS 30 mL 6    irbesartan (AVAPRO) 300 MG tablet TAKE 1 TABLET BY MOUTH IN  THE EVENING 90 tablet 3    ketoconazole (NIZORAL) 2 % shampoo Wash hair with medicated shampoo at least 2x/week - let sit on scalp at least 5 minutes prior to rinsing 120 mL 5    labetaloL (NORMODYNE) 300 MG tablet Take 1 tablet (300 mg total) by mouth 2 (two) times daily. 180 tablet 3    lancets 31 gauge Misc 1 lancet by Misc.(Non-Drug; Combo Route) route 4 (four) times daily. E11.42 . Prescribed one touch 200 each 6    multivitamin (THERAGRAN) per tablet Take 1 tablet by mouth once daily.      pregabalin (LYRICA) 150 MG capsule TAKE 1 CAPSULE BY MOUTH TWICE  DAILY 60 capsule 5    TRESIBA FLEXTOUCH U-100 100 unit/mL (3 mL) insulin pen INJECT 40 UNITS INTO THE SKIN  ONCE DAILY 15 mL 8    famotidine (PEPCID) 20 MG tablet Take 1 tablet (20 mg total) by mouth 2 (two) times daily. 1 tablet 0    sodium bicarbonate 325 MG tablet Take 2 tablets (650 mg total) by mouth 2 (two)  times daily. 120 tablet 11    [DISCONTINUED] insulin glargine, TOUJEO, (TOUJEO SOLOSTAR) 300 unit/mL (1.5 mL) InPn pen INJECT 40 UNITS INTO THE SKIN EVERY EVENING TITRATE UP AS DIRECTED. 4.5 mL 0     Current Facility-Administered Medications on File Prior to Visit   Medication Dose Route Frequency Provider Last Rate Last Admin    fentaNYL injection 25 mcg  25 mcg Intravenous Q5 Min PRN Desmond Fontana MD   50 mcg at 01/08/21 0955    midazolam (VERSED) 1 mg/mL injection 0.5 mg  0.5 mg Intravenous PRN Desmond Fontana MD   2 mg at 01/08/21 0955    triamcinolone acetonide injection 10 mg  10 mg Intradermal Once Susie Curran MD        triamcinolone acetonide injection 10 mg  10 mg Intradermal Once Susie Curran MD         She is allergic to clindamycin and iodinated contrast media..      BP Readings from Last 3 Encounters:   09/21/23 134/62   07/27/23 122/76   06/15/23 124/72     Snoring / Sleep:     [place] Questionnaire (validated RUFINA screening questionnaire)    YES -- Snoring/apnea    YES -- Fatigue    Body mass index is 48.02 kg/m².  (>25 is overweight, >30 is obese)    Blood Pressure = Hypertension  (PreHTN 120-139/80-89, Stg1 140-159/90-99, Stg2 >160/>100)  [place] = 3 of three RUFINA categories are positive (high risk is 2-3 positive categories)     [place] Sleepiness Scale TOTAL =           9/21/2023     2:51 PM   EPWORTH SLEEPINESS SCALE   Sitting and reading 2   Watching TV 2   Sitting, inactive in a public place (e.g. a theatre or a meeting) 2   As a passenger in a car for an hour without a break 2   Lying down to rest in the afternoon when circumstances permit 2   Sitting and talking to someone 2   Sitting quietly after a lunch without alcohol 2   In a car, while stopped for a few minutes in traffic 0   Total score 14       (validated sleepiness questionnaire with a higher score indicating greater sleepiness; range 0-24)  No flowsheet data found.    STOP-Bang Questionnaire  "(validated RUFINA screening questionnaire)  Negative unless checked off.  [x] Snoring    [x]  Tired/Fatigued/Sleepy  [x] Obstruction (apneas/choking)  [x] Pressure (HTN)  [] BMI >35  [x] Age >50  [] Neck >40 cm  [] Gender male   STOP-Bang = 5 (low risk 0-2,high risk 3-8)    Neck circumference 16"        MMRC Dyspnea Scale (4 is worst)     [] MMRC 0: Dyspneic on strenuous excercise (0 points)    [x] MMRC 1: Dyspneic on walking a slight hill (0 points)    [] MMRC 2: Dyspneic on walking level ground; must stop occasionally due to breathlessness (1 point)    [] MMRC 3: Must stop for breathlessness after walking 100 yards or after a few minutes (2 points)    [] MMRC 4: Cannot leave house; breathless on dressing/undressing (3 points)                     No flowsheet data found.              Objective:     Vital Signs (Most Recent)  Vital Signs  Pulse: 83  Resp: 20  SpO2: 95 %  BP: 134/62  Pain Score: 10-Worst pain ever (B Legs)  Height and Weight  Height: 5' 5" (165.1 cm)  Weight: 130.9 kg (288 lb 9.3 oz)  BSA (Calculated - sq m): 2.45 sq meters  BMI (Calculated): 48  Weight in (lb) to have BMI = 25: 149.9]  Wt Readings from Last 2 Encounters:   09/21/23 130.9 kg (288 lb 9.3 oz)   07/27/23 132.3 kg (291 lb 10.7 oz)       Physical Exam  Vitals and nursing note reviewed.   Constitutional:       Appearance: She is normal weight.   HENT:      Head: Normocephalic and atraumatic.      Comments: Malampati score 4     Nose: Nose normal.   Eyes:      Pupils: Pupils are equal, round, and reactive to light.   Cardiovascular:      Rate and Rhythm: Normal rate and regular rhythm.      Pulses: Normal pulses.      Heart sounds: Normal heart sounds.   Pulmonary:      Effort: Pulmonary effort is normal.      Breath sounds: Normal breath sounds.   Abdominal:      General: Bowel sounds are normal.      Palpations: Abdomen is soft.   Musculoskeletal:      Cervical back: Normal range of motion.   Skin:     General: Skin is warm.   Neurological: " "     General: No focal deficit present.      Mental Status: She is alert and oriented to person, place, and time.   Psychiatric:         Mood and Affect: Mood normal.          Laboratory  Lab Results   Component Value Date    WBC 5.60 05/16/2023    RBC 3.49 (L) 05/16/2023    HGB 9.8 (L) 05/16/2023    HCT 31.5 (L) 05/16/2023    MCV 90 05/16/2023    MCH 28.1 05/16/2023    MCHC 31.1 (L) 05/16/2023    RDW 14.7 (H) 05/16/2023     05/16/2023    MPV 10.9 05/16/2023    GRAN 2.8 05/16/2023    GRAN 49.7 05/16/2023    LYMPH 1.9 05/16/2023    LYMPH 34.6 05/16/2023    MONO 0.6 05/16/2023    MONO 10.0 05/16/2023    EOS 0.3 05/16/2023    BASO 0.03 05/16/2023    EOSINOPHIL 4.8 05/16/2023    BASOPHIL 0.5 05/16/2023       BMP  Lab Results   Component Value Date     05/16/2023    K 4.2 05/16/2023     05/16/2023    CO2 22 (L) 05/16/2023    BUN 42 (H) 05/16/2023    CREATININE 1.6 (H) 05/16/2023    CALCIUM 10.1 05/16/2023    ANIONGAP 11 05/16/2023    ESTGFRAFRICA 29.9 (A) 06/13/2022    EGFRNONAA 26.0 (A) 06/13/2022    AST 29 05/16/2023    ALT 24 05/16/2023    PROT 6.4 05/16/2023          No results found for: "IGE"     No results found for: "ASPERGILLUS"  No results found for: "AFUMIGATUSCL"     No results found for: "ACE"     Diagnostic Results:  I have personally reviewed today the following studies:     PHYSICIAN INTERPRETATION AND COMMENTS: Findings are consistent with mild, non-positional obstructive sleep  apnea(RUFINA). Therapy with CPAP indicated  CLINICAL HISTORY: 72 year old female presented with: 15.25 inch neck, BMI of 46.6, an [place] sleepiness score of 11,  history of hypertension, history of stroke, diabetes and symptoms of nocturnal snoring, waking up choking and witnessed  apneas. Based on the clinical history, the patient has a high pre-test probability of having Moderate RUFINA.  SLEEP STUDY FINDINGS: Patient underwent a 3 night Home Sleep Test and by behavioral criteria, slept for approximately  19.22 " hours, with a sleep latency of 6 minutes and a sleep efficiency of 93.3%. Mild sleep disordered breathing (AHI=8) is  noted based on a 4% hypopnea desaturation criteria. The patient slept supine 12.2% of the night based on valid recording  time of 19.55 hours. When considering more subtle measures of sleep disordered breathing, the overall respiratory  disturbance index is mild(RDI=16) based on a 1% hypopnea desaturation criteria with confirmation by surrogate arousal  indicators. The apneas/hypopneas are accompanied by minimal oxygen desaturation (percent time below 90% SpO2: 0%,  Min SpO2: 84.3%). The average desaturation across all sleep disordered breathing events is 2.2%. Snoring occurs for 8.2%  (30 dB) of the study, 3.8% is very loud. The mean pulse rate is 71.9 BPM, with infrequent pulse rate variability (23 events  with >= 6 BPM increase/decrease per hour).  TREATMENT CONSIDERATIONS: Consider nasal continuous positive airway pressure (CPAP/AutoPAP) as the initial  treatment choice based on the AHI severity, daytime somnolence and co-morbidities. A mandibular advancement splint       Assessment/Plan:     Problem List Items Addressed This Visit       Type 2 diabetes mellitus with both eyes affected by proliferative retinopathy and macular edema, with long-term current use of insulin    Essential hypertension    Asthma    RUFINA (obstructive sleep apnea) - Primary    Relevant Orders    CPAP FOR HOME USE        Discussed therapeutic goals for positive airway pressure therapy(CPAP or BiPAP):   Ideal is usage 100% of nights for 6 - 8 hours per night.   Minimum usage is 70% of night for at least 4 hours per night used.  Mask choices discussed.  Patient expressed understanding. All Questions answered.      Follow up in about 10 weeks (around 11/30/2023), or CPAP orders, Virtual visit.    This note was prepared using voice recognition system and is likely to have sound alike errors that may have been overlooked even  after proof reading.  Please call me with any questions    Discussed diagnosis, its evaluation, treatment and usual course. All questions answered.    Thank you for the courtesy of participating in the care of this patient    Trent Leon MD      Personal Diagnostic Review  [x]  CXR    [x]  Sleep study    [x]  echo    []  6MWD    []  LABS    []  CHEST CT    []  PET CT    []  Biopsy results

## 2023-09-29 ENCOUNTER — OFFICE VISIT (OUTPATIENT)
Dept: PODIATRY | Facility: CLINIC | Age: 73
End: 2023-09-29
Payer: MEDICARE

## 2023-09-29 VITALS
DIASTOLIC BLOOD PRESSURE: 72 MMHG | SYSTOLIC BLOOD PRESSURE: 133 MMHG | HEART RATE: 69 BPM | BODY MASS INDEX: 48.08 KG/M2 | HEIGHT: 65 IN | WEIGHT: 288.56 LBS

## 2023-09-29 DIAGNOSIS — B35.1 ONYCHOMYCOSIS DUE TO DERMATOPHYTE: ICD-10-CM

## 2023-09-29 DIAGNOSIS — E11.42 DIABETIC POLYNEUROPATHY ASSOCIATED WITH TYPE 2 DIABETES MELLITUS: Primary | ICD-10-CM

## 2023-09-29 PROCEDURE — 11721 DEBRIDE NAIL 6 OR MORE: CPT | Mod: Q9,S$GLB,, | Performed by: PODIATRIST

## 2023-09-29 PROCEDURE — 11721 PR DEBRIDEMENT OF NAILS, 6 OR MORE: ICD-10-PCS | Mod: Q9,S$GLB,, | Performed by: PODIATRIST

## 2023-09-29 PROCEDURE — 99999 PR PBB SHADOW E&M-EST. PATIENT-LVL IV: ICD-10-PCS | Mod: PBBFAC,,, | Performed by: PODIATRIST

## 2023-09-29 PROCEDURE — 99499 UNLISTED E&M SERVICE: CPT | Mod: S$GLB,,, | Performed by: PODIATRIST

## 2023-09-29 PROCEDURE — 99999 PR PBB SHADOW E&M-EST. PATIENT-LVL IV: CPT | Mod: PBBFAC,,, | Performed by: PODIATRIST

## 2023-09-29 PROCEDURE — 99499 NO LOS: ICD-10-PCS | Mod: S$GLB,,, | Performed by: PODIATRIST

## 2023-09-29 NOTE — PROGRESS NOTES
Subjective:      Patient ID: Annika Mac is a 72 y.o. female.    Chief Complaint: No chief complaint on file.      Annika is a 72 y.o. female who presents to the clinic for evaluation and treatment of high risk feet. Annika has a past medical history of Asthma, Chronic obstructive pulmonary disease, unspecified COPD type (1/14/2022), Constipation (10/3/2019), Deep vein thrombophlebitis of left leg (7/27/2012), Deep vein thrombosis, Diabetic foot ulcer with osteomyelitis, Encounter for blood transfusion, GERD (gastroesophageal reflux disease), Obesity, diabetes, and hypertension syndrome (2/13/2019), Obstructive sleep apnea (7/27/2012), PAD (peripheral artery disease) (11/21/2013), Pressure ulcer of toe of left foot, Type 2 diabetes mellitus with obesity (8/19/2020), and Type 2 diabetes mellitus with peripheral artery disease (8/19/2020). The patient's chief complaint is long, thick toenails. This patient has documented high risk feet requiring routine maintenance secondary to peripheral neuropathy.    PCP: Mamie Cotton MD    Date Last Seen by PCP:   No chief complaint on file.        Current shoe gear:  Affected Foot: Rx diabetic extra depth shoes and custom accommodative insoles     Unaffected Foot: Rx diabetic extra depth shoes and custom accommodative insoles    Hemoglobin A1C   Date Value Ref Range Status   01/05/2023 7.7 (H) 4.0 - 5.6 % Final     Comment:     ADA Screening Guidelines:  5.7-6.4%  Consistent with prediabetes  >or=6.5%  Consistent with diabetes    High levels of fetal hemoglobin interfere with the HbA1C  assay. Heterozygous hemoglobin variants (HbS, HgC, etc)do  not significantly interfere with this assay.   However, presence of multiple variants may affect accuracy.     08/24/2022 7.4 (H) 4.0 - 5.6 % Final     Comment:     ADA Screening Guidelines:  5.7-6.4%  Consistent with prediabetes  >or=6.5%  Consistent with diabetes    High levels of fetal hemoglobin interfere with the  HbA1C  assay. Heterozygous hemoglobin variants (HbS, HgC, etc)do  not significantly interfere with this assay.   However, presence of multiple variants may affect accuracy.     04/11/2022 8.1 (H) 4.0 - 5.6 % Final     Comment:     ADA Screening Guidelines:  5.7-6.4%  Consistent with prediabetes  >or=6.5%  Consistent with diabetes    High levels of fetal hemoglobin interfere with the HbA1C  assay. Heterozygous hemoglobin variants (HbS, HgC, etc)do  not significantly interfere with this assay.   However, presence of multiple variants may affect accuracy.         Review of Systems   Constitutional: Negative for chills, fever and malaise/fatigue.   HENT:  Negative for hearing loss.    Cardiovascular:  Positive for leg swelling. Negative for claudication.   Respiratory:  Negative for shortness of breath.    Skin:  Positive for color change, dry skin, nail changes and unusual hair distribution. Negative for flushing and rash.   Musculoskeletal:  Negative for joint pain and myalgias.   Neurological:  Positive for numbness, paresthesias and sensory change. Negative for loss of balance.   Psychiatric/Behavioral:  Negative for altered mental status.            Objective:      Physical Exam  Vitals reviewed.   Constitutional:       Appearance: She is well-developed.   Cardiovascular:      Pulses:           Dorsalis pedis pulses are 0 on the right side and 0 on the left side.        Posterior tibial pulses are 1+ on the right side and 1+ on the left side.   Musculoskeletal:      Right lower leg: Edema present.      Left lower leg: Edema present.      Right ankle: Decreased range of motion. Abnormal pulse.      Right Achilles Tendon: Patel's test negative.      Left ankle: Decreased range of motion. Abnormal pulse.      Left Achilles Tendon: Patel's test negative.      Right foot: Decreased range of motion.      Left foot: Decreased range of motion.   Feet:      Right foot:      Protective Sensation: 5 sites tested.  2  sites sensed.      Left foot:      Protective Sensation: 5 sites tested.  2 sites sensed.   Skin:     General: Skin is warm and dry.      Capillary Refill: Capillary refill takes more than 3 seconds.      Comments: Nails x10 are elongated by  5-7mm's, thickened by 2-3 mm's, dystrophic, and are yellowish in  coloration . Xerosis Bilaterally. No open lesions noted.    Neurological:      Mental Status: She is alert.      Comments: diminished sensation noted to b/L lower extremities   Psychiatric:         Behavior: Behavior normal. Behavior is cooperative.               Assessment:       Encounter Diagnoses   Name Primary?    Diabetic polyneuropathy associated with type 2 diabetes mellitus Yes    Onychomycosis due to dermatophyte          Plan:       Diagnoses and all orders for this visit:    Diabetic polyneuropathy associated with type 2 diabetes mellitus    Onychomycosis due to dermatophyte      I counseled the patient on her conditions, their implications and medical management.    Pt advised to wear compression stockings for lower extremity swelling.     - Shoe inspection. Diabetic Foot Education. Patient reminded of the importance of good nutrition and blood sugar control to help prevent podiatric complications of diabetes. Patient instructed on proper foot hygeine. We discussed wearing proper shoe gear, daily foot inspections, never walking without protective shoe gear, never putting sharp instruments to feet, routine podiatric nail visits every 2-3 months.      - With patient's permission, nails were aggressively reduced and debrided x 10 to their soft tissue attachment mechanically and with electric , removing all offending nail and debris. Patient relates relief following the procedure. She will continue to monitor the areas daily, inspect her feet, wear protective shoe gear when ambulatory, moisturizer to maintain skin integrity and follow in this office in approximately 2-3 months, sooner p.r.n.

## 2023-10-06 ENCOUNTER — CLINICAL SUPPORT (OUTPATIENT)
Dept: DIABETES | Facility: CLINIC | Age: 73
End: 2023-10-06
Payer: MEDICARE

## 2023-10-06 ENCOUNTER — PATIENT MESSAGE (OUTPATIENT)
Dept: DIABETES | Facility: CLINIC | Age: 73
End: 2023-10-06

## 2023-10-06 DIAGNOSIS — E11.42 TYPE 2 DIABETES MELLITUS WITH DIABETIC POLYNEUROPATHY, UNSPECIFIED WHETHER LONG TERM INSULIN USE: ICD-10-CM

## 2023-10-06 PROCEDURE — G0108 DIAB MANAGE TRN  PER INDIV: HCPCS | Mod: S$GLB,,, | Performed by: INTERNAL MEDICINE

## 2023-10-06 PROCEDURE — G0108 PR DIAB MANAGE TRN  PER INDIV: ICD-10-PCS | Mod: S$GLB,,, | Performed by: INTERNAL MEDICINE

## 2023-10-06 NOTE — PROGRESS NOTES
"Diabetes Care Specialist Follow-up Note  Author: Maria G Lopez RN, CDE  Date: 10/6/2023    Program Intake  Reason for Diabetes Program Visit:: Intervention  Type of Intervention:: Individual  Individual: Device Training  Device Training: Personal CGM    Lab Results   Component Value Date    HGBA1C 7.7 (H) 01/05/2023           During today's follow-up visit,  the following areas required further assessment and content was provided/reviewed.    Based on today's diabetes care assessment, the following areas of need were identified:        Today's interventions were provided through individual discussion, instruction, and written materials were provided.    Patient verbalized understanding of instruction and written materials.  Pt was able to return back demonstration of instructions today. Patient understood key points, needs reinforcement and further instruction.     Diabetes Self-Management Care Plan Review and Evaluation of Progress:    During today's follow-up Annika's Diabetes Self-Management Care Plan progress was reviewed and progress was evaluated including his/her input. Annika has agreed to continue his/her journey to improve/maintain overall diabetes control by continuing to set health goals. See care plan progress below.      Care Plan: Diabetes Management   Updates made since 9/6/2023 12:00 AM        Problem: Blood Glucose Self-Monitoring         Long-Range Goal: Patient agrees to check and record blood sugars using the Dexcom G6 system with     Start Date: 6/21/2023   Expected End Date: 10/24/2023   Priority: Low   Barriers: Knowledge deficit   Note:    DEXCOM G6 CGM TRAINING     Patient referred to clinic today for Dexcom G6 continuous glucose sensor system training.  Patient arrived with  fully charged aEducation was provided using "Quick Start Guide" per Dexcom protocol.     Pt will be using her  as the primary .  Overview:  5min glucose reading updates, trending arrows, BG " graph screens, battery life indicator, Blue Tooth Symbol.  Menus: trend Graph, start sensor, enter BG, events, Alerts, Settings, Shutdown, Stop Sensor   Settings:                          * Urgent Low: 55 mg/dL                          * Urgent Low Soon: on                          * Low alert: 80 mg/dl                           * High alert: 250 mg/dl                           * Rise rate: off                          * Fall Rate: off                          * Signal Loss: on repeat 20 min                          * No Reading: on repeat 20 min                          * Always sound: on                          * Alert Schedule:  (instructed on how to set up alert schedule if desired)     Reviewed additional setting options with patient, including Graph Height and Transmitter ID. Transmitter was paired with .    Reviewed where to find sensor insertion time and sensor expiration date.   Discussed no need to calibrate sensor during the entirety of the 10 day wear. Discussed that pt can calibrate sensor if desired, but at that time she will need to continue calibrating every 12 hours for sensor to remain accurate. Reviewed appropriate calibration techniques.  Reviewed sensor site selection. Site selected and prepped using aseptic technique Inserted to abdomin. Transmitter placed in pod and secured.  Practiced sensor pod/transmitter removal from site, and removal of transmitter from sensor pod.  Patient able to demonstrate without difficulty.  Encouraged to review manual prior to starting another sensor.   Reviewed problem solving aspects of sensor transmission/ variables that can disrupt RF transmission.  range 20 feet, but the first 3 hrs keep within 3 feet of transmitter.  Pt instructed on lag time of interstitial fluid from CBG and was advised to tx hypoglycemia and dose insulin based on SMBG values.  Dexcom technical support contact number given and examples of when to contact them  discussed. Pt will download software at home for Dexcom download.   Patient advised to always check glucose with glucometer should readings do not match symptoms felt (ex. Hypo or hyperglycemia).        Update to care planning 10/6/2023:  Patient trained on the Dexcom G7.  She will be using her .         Follow Up Plan     No follow-ups on file.    Today's care plan and follow up schedule was discussed with patient.  Annika verbalized understanding of the care plan, goals, and agrees to follow up plan.        The patient was encouraged to communicate with his/her health care provider/physician and care team regarding his/her condition(s) and treatment.  I provided the patient with my contact information today and encouraged to contact me via phone or [facility] Patient Portal as needed.     Length of Visit   Total Time: 30 Minutes

## 2023-10-12 ENCOUNTER — TELEPHONE (OUTPATIENT)
Dept: INTERNAL MEDICINE | Facility: CLINIC | Age: 73
End: 2023-10-12
Payer: COMMERCIAL

## 2023-10-12 NOTE — TELEPHONE ENCOUNTER
----- Message from Dale Sommer sent at 10/12/2023 11:58 AM CDT -----  Contact: [phone]  Patient is returning a phone call.  Who left a message for the patient: Maday  Does patient know what this is regarding:  yes  Would you like a call back, or a response through your [facility] portal?:   callback  Comments:

## 2023-10-12 NOTE — TELEPHONE ENCOUNTER
----- Message from Arlyn Blackman sent at 10/12/2023 11:25 AM CDT -----  Contact: [phone]  [email or web address]     Patient is calling for Medical Advice regarding:issues with her legs     How long has patient had these symptoms: a couple of days     Pharmacy name and phone#:  [facility] [address] [facility] [address] [facility] [address] [id number] [facility] [address]  [facility] [address]  Phone: [phone] Fax: [phone]       Would like response via [facility]:  no    Comments: pt is stating she has pain in her legs and she states like electric running through them she is asking if she can be seen on 10/24 she states she will be coming here for an appt already please advise

## 2023-10-24 ENCOUNTER — LAB VISIT (OUTPATIENT)
Dept: LAB | Facility: HOSPITAL | Age: 73
End: 2023-10-24
Attending: INTERNAL MEDICINE
Payer: COMMERCIAL

## 2023-10-24 ENCOUNTER — IMMUNIZATION (OUTPATIENT)
Dept: INTERNAL MEDICINE | Facility: CLINIC | Age: 73
End: 2023-10-24
Payer: MEDICARE

## 2023-10-24 ENCOUNTER — OFFICE VISIT (OUTPATIENT)
Dept: ENDOCRINOLOGY | Facility: CLINIC | Age: 73
End: 2023-10-24
Payer: MEDICARE

## 2023-10-24 ENCOUNTER — OFFICE VISIT (OUTPATIENT)
Dept: INTERNAL MEDICINE | Facility: CLINIC | Age: 73
End: 2023-10-24
Payer: MEDICARE

## 2023-10-24 VITALS
HEIGHT: 65 IN | BODY MASS INDEX: 48 KG/M2 | OXYGEN SATURATION: 99 % | WEIGHT: 288.13 LBS | SYSTOLIC BLOOD PRESSURE: 138 MMHG | HEART RATE: 75 BPM | DIASTOLIC BLOOD PRESSURE: 65 MMHG

## 2023-10-24 VITALS
OXYGEN SATURATION: 98 % | BODY MASS INDEX: 47.97 KG/M2 | HEART RATE: 82 BPM | WEIGHT: 288.25 LBS | DIASTOLIC BLOOD PRESSURE: 64 MMHG | SYSTOLIC BLOOD PRESSURE: 130 MMHG

## 2023-10-24 DIAGNOSIS — G89.29 CHRONIC LOW BACK PAIN WITH SCIATICA, SCIATICA LATERALITY UNSPECIFIED, UNSPECIFIED BACK PAIN LATERALITY: ICD-10-CM

## 2023-10-24 DIAGNOSIS — E11.22 TYPE 2 DIABETES MELLITUS WITH STAGE 3B CHRONIC KIDNEY DISEASE AND HYPERTENSION: Primary | ICD-10-CM

## 2023-10-24 DIAGNOSIS — E78.5 HYPERLIPIDEMIA, UNSPECIFIED HYPERLIPIDEMIA TYPE: ICD-10-CM

## 2023-10-24 DIAGNOSIS — M54.40 CHRONIC LOW BACK PAIN WITH SCIATICA, SCIATICA LATERALITY UNSPECIFIED, UNSPECIFIED BACK PAIN LATERALITY: ICD-10-CM

## 2023-10-24 DIAGNOSIS — M48.061 SPINAL STENOSIS OF LUMBAR REGION, UNSPECIFIED WHETHER NEUROGENIC CLAUDICATION PRESENT: ICD-10-CM

## 2023-10-24 DIAGNOSIS — N06.9 ISOLATED PROTEINURIA WITH MORPHOLOGIC LESION: ICD-10-CM

## 2023-10-24 DIAGNOSIS — M17.11 PRIMARY OSTEOARTHRITIS OF RIGHT KNEE: ICD-10-CM

## 2023-10-24 DIAGNOSIS — G47.33 OSA (OBSTRUCTIVE SLEEP APNEA): ICD-10-CM

## 2023-10-24 DIAGNOSIS — E11.43 DIABETIC AUTONOMIC NEUROPATHY ASSOCIATED WITH TYPE 2 DIABETES MELLITUS: ICD-10-CM

## 2023-10-24 DIAGNOSIS — E11.42 TYPE 2 DIABETES MELLITUS WITH DIABETIC POLYNEUROPATHY, UNSPECIFIED WHETHER LONG TERM INSULIN USE: Primary | ICD-10-CM

## 2023-10-24 DIAGNOSIS — E11.43 PERIPHERAL AUTONOMIC NEUROPATHY DUE TO DM: ICD-10-CM

## 2023-10-24 DIAGNOSIS — E11.42 TYPE 2 DIABETES MELLITUS WITH DIABETIC POLYNEUROPATHY, WITH LONG-TERM CURRENT USE OF INSULIN: ICD-10-CM

## 2023-10-24 DIAGNOSIS — N18.30 ANEMIA IN STAGE 3 CHRONIC KIDNEY DISEASE, UNSPECIFIED WHETHER STAGE 3A OR 3B CKD: ICD-10-CM

## 2023-10-24 DIAGNOSIS — E11.42 TYPE 2 DIABETES MELLITUS WITH DIABETIC POLYNEUROPATHY, UNSPECIFIED WHETHER LONG TERM INSULIN USE: ICD-10-CM

## 2023-10-24 DIAGNOSIS — N18.32 STAGE 3B CHRONIC KIDNEY DISEASE: ICD-10-CM

## 2023-10-24 DIAGNOSIS — M47.816 LUMBAR FACET ARTHROPATHY: ICD-10-CM

## 2023-10-24 DIAGNOSIS — E11.3513 TYPE 2 DIABETES MELLITUS WITH BOTH EYES AFFECTED BY PROLIFERATIVE RETINOPATHY AND MACULAR EDEMA, WITH LONG-TERM CURRENT USE OF INSULIN: ICD-10-CM

## 2023-10-24 DIAGNOSIS — Z79.4 TYPE 2 DIABETES MELLITUS WITH BOTH EYES AFFECTED BY PROLIFERATIVE RETINOPATHY AND MACULAR EDEMA, WITH LONG-TERM CURRENT USE OF INSULIN: ICD-10-CM

## 2023-10-24 DIAGNOSIS — I10 PRIMARY HYPERTENSION: ICD-10-CM

## 2023-10-24 DIAGNOSIS — I12.9 TYPE 2 DIABETES MELLITUS WITH STAGE 3B CHRONIC KIDNEY DISEASE AND HYPERTENSION: Primary | ICD-10-CM

## 2023-10-24 DIAGNOSIS — I12.9 TYPE 2 DIABETES MELLITUS WITH STAGE 3B CHRONIC KIDNEY DISEASE AND HYPERTENSION: ICD-10-CM

## 2023-10-24 DIAGNOSIS — Z86.73 HISTORY OF STROKE: ICD-10-CM

## 2023-10-24 DIAGNOSIS — D63.1 ANEMIA IN STAGE 3 CHRONIC KIDNEY DISEASE, UNSPECIFIED WHETHER STAGE 3A OR 3B CKD: ICD-10-CM

## 2023-10-24 DIAGNOSIS — N18.32 TYPE 2 DIABETES MELLITUS WITH STAGE 3B CHRONIC KIDNEY DISEASE AND HYPERTENSION: Primary | ICD-10-CM

## 2023-10-24 DIAGNOSIS — E11.649 HYPOGLYCEMIA UNAWARENESS ASSOCIATED WITH TYPE 2 DIABETES MELLITUS: Chronic | ICD-10-CM

## 2023-10-24 DIAGNOSIS — E11.22 TYPE 2 DIABETES MELLITUS WITH STAGE 3B CHRONIC KIDNEY DISEASE AND HYPERTENSION: ICD-10-CM

## 2023-10-24 DIAGNOSIS — N18.32 TYPE 2 DIABETES MELLITUS WITH STAGE 3B CHRONIC KIDNEY DISEASE AND HYPERTENSION: ICD-10-CM

## 2023-10-24 DIAGNOSIS — G63 POLYNEUROPATHY ASSOCIATED WITH UNDERLYING DISEASE: ICD-10-CM

## 2023-10-24 DIAGNOSIS — Z79.4 TYPE 2 DIABETES MELLITUS WITH DIABETIC POLYNEUROPATHY, WITH LONG-TERM CURRENT USE OF INSULIN: ICD-10-CM

## 2023-10-24 LAB
ALBUMIN SERPL BCP-MCNC: 3.6 G/DL (ref 3.5–5.2)
ALP SERPL-CCNC: 120 U/L (ref 55–135)
ALT SERPL W/O P-5'-P-CCNC: 12 U/L (ref 10–44)
ANION GAP SERPL CALC-SCNC: 10 MMOL/L (ref 8–16)
AST SERPL-CCNC: 16 U/L (ref 10–40)
BASOPHILS # BLD AUTO: 0.05 K/UL (ref 0–0.2)
BASOPHILS NFR BLD: 0.7 % (ref 0–1.9)
BILIRUB SERPL-MCNC: 0.2 MG/DL (ref 0.1–1)
BUN SERPL-MCNC: 31 MG/DL (ref 8–23)
CALCIUM SERPL-MCNC: 9.9 MG/DL (ref 8.7–10.5)
CHLORIDE SERPL-SCNC: 107 MMOL/L (ref 95–110)
CO2 SERPL-SCNC: 23 MMOL/L (ref 23–29)
CREAT SERPL-MCNC: 1.8 MG/DL (ref 0.5–1.4)
DIFFERENTIAL METHOD: ABNORMAL
EOSINOPHIL # BLD AUTO: 0.3 K/UL (ref 0–0.5)
EOSINOPHIL NFR BLD: 4.4 % (ref 0–8)
ERYTHROCYTE [DISTWIDTH] IN BLOOD BY AUTOMATED COUNT: 14.5 % (ref 11.5–14.5)
EST. GFR  (NO RACE VARIABLE): 29.4 ML/MIN/1.73 M^2
ESTIMATED AVG GLUCOSE: 169 MG/DL (ref 68–131)
GLUCOSE SERPL-MCNC: 132 MG/DL (ref 70–110)
HBA1C MFR BLD: 7.5 % (ref 4–5.6)
HCT VFR BLD AUTO: 35.9 % (ref 37–48.5)
HGB BLD-MCNC: 10.8 G/DL (ref 12–16)
IMM GRANULOCYTES # BLD AUTO: 0.03 K/UL (ref 0–0.04)
IMM GRANULOCYTES NFR BLD AUTO: 0.4 % (ref 0–0.5)
LYMPHOCYTES # BLD AUTO: 1.9 K/UL (ref 1–4.8)
LYMPHOCYTES NFR BLD: 27.1 % (ref 18–48)
MCH RBC QN AUTO: 26.9 PG (ref 27–31)
MCHC RBC AUTO-ENTMCNC: 30.1 G/DL (ref 32–36)
MCV RBC AUTO: 90 FL (ref 82–98)
MONOCYTES # BLD AUTO: 0.7 K/UL (ref 0.3–1)
MONOCYTES NFR BLD: 9.3 % (ref 4–15)
NEUTROPHILS # BLD AUTO: 4 K/UL (ref 1.8–7.7)
NEUTROPHILS NFR BLD: 58.1 % (ref 38–73)
NRBC BLD-RTO: 0 /100 WBC
PLATELET # BLD AUTO: 277 K/UL (ref 150–450)
PMV BLD AUTO: 10.7 FL (ref 9.2–12.9)
POTASSIUM SERPL-SCNC: 4.3 MMOL/L (ref 3.5–5.1)
PROT SERPL-MCNC: 6.6 G/DL (ref 6–8.4)
RBC # BLD AUTO: 4.01 M/UL (ref 4–5.4)
SODIUM SERPL-SCNC: 140 MMOL/L (ref 136–145)
WBC # BLD AUTO: 6.97 K/UL (ref 3.9–12.7)

## 2023-10-24 PROCEDURE — 4010F ACE/ARB THERAPY RXD/TAKEN: CPT | Mod: CPTII,S$GLB,, | Performed by: INTERNAL MEDICINE

## 2023-10-24 PROCEDURE — 1125F PR PAIN SEVERITY QUANTIFIED, PAIN PRESENT: ICD-10-PCS | Mod: CPTII,S$GLB,, | Performed by: INTERNAL MEDICINE

## 2023-10-24 PROCEDURE — 1101F PR PT FALLS ASSESS DOC 0-1 FALLS W/OUT INJ PAST YR: ICD-10-PCS | Mod: CPTII,S$GLB,, | Performed by: INTERNAL MEDICINE

## 2023-10-24 PROCEDURE — 3008F PR BODY MASS INDEX (BMI) DOCUMENTED: ICD-10-PCS | Mod: CPTII,S$GLB,, | Performed by: INTERNAL MEDICINE

## 2023-10-24 PROCEDURE — 3060F PR POS MICROALBUMINURIA RESULT DOCUMENTED/REVIEW: ICD-10-PCS | Mod: CPTII,S$GLB,, | Performed by: INTERNAL MEDICINE

## 2023-10-24 PROCEDURE — 3060F POS MICROALBUMINURIA REV: CPT | Mod: CPTII,S$GLB,, | Performed by: INTERNAL MEDICINE

## 2023-10-24 PROCEDURE — 3075F SYST BP GE 130 - 139MM HG: CPT | Mod: CPTII,S$GLB,, | Performed by: INTERNAL MEDICINE

## 2023-10-24 PROCEDURE — 95251 PR GLUCOSE MONITOR, 72 HOUR, PHYS INTERP: ICD-10-PCS | Mod: S$GLB,,, | Performed by: INTERNAL MEDICINE

## 2023-10-24 PROCEDURE — 36415 COLL VENOUS BLD VENIPUNCTURE: CPT | Performed by: INTERNAL MEDICINE

## 2023-10-24 PROCEDURE — 1160F RVW MEDS BY RX/DR IN RCRD: CPT | Mod: CPTII,S$GLB,, | Performed by: INTERNAL MEDICINE

## 2023-10-24 PROCEDURE — 3066F NEPHROPATHY DOC TX: CPT | Mod: CPTII,S$GLB,, | Performed by: INTERNAL MEDICINE

## 2023-10-24 PROCEDURE — 1160F PR REVIEW ALL MEDS BY PRESCRIBER/CLIN PHARMACIST DOCUMENTED: ICD-10-PCS | Mod: CPTII,S$GLB,, | Performed by: INTERNAL MEDICINE

## 2023-10-24 PROCEDURE — 1159F MED LIST DOCD IN RCRD: CPT | Mod: CPTII,S$GLB,, | Performed by: INTERNAL MEDICINE

## 2023-10-24 PROCEDURE — G0008 FLU VACCINE - QUADRIVALENT - ADJUVANTED: ICD-10-PCS | Mod: S$GLB,,, | Performed by: INTERNAL MEDICINE

## 2023-10-24 PROCEDURE — 99999 PR PBB SHADOW E&M-EST. PATIENT-LVL V: ICD-10-PCS | Mod: PBBFAC,,, | Performed by: INTERNAL MEDICINE

## 2023-10-24 PROCEDURE — 1125F AMNT PAIN NOTED PAIN PRSNT: CPT | Mod: CPTII,S$GLB,, | Performed by: INTERNAL MEDICINE

## 2023-10-24 PROCEDURE — 1101F PT FALLS ASSESS-DOCD LE1/YR: CPT | Mod: CPTII,S$GLB,, | Performed by: INTERNAL MEDICINE

## 2023-10-24 PROCEDURE — 4010F PR ACE/ARB THEARPY RXD/TAKEN: ICD-10-PCS | Mod: CPTII,S$GLB,, | Performed by: INTERNAL MEDICINE

## 2023-10-24 PROCEDURE — 3078F PR MOST RECENT DIASTOLIC BLOOD PRESSURE < 80 MM HG: ICD-10-PCS | Mod: CPTII,S$GLB,, | Performed by: INTERNAL MEDICINE

## 2023-10-24 PROCEDURE — 99214 OFFICE O/P EST MOD 30 MIN: CPT | Mod: 25,S$GLB,, | Performed by: INTERNAL MEDICINE

## 2023-10-24 PROCEDURE — 90694 VACC AIIV4 NO PRSRV 0.5ML IM: CPT | Mod: S$GLB,,, | Performed by: INTERNAL MEDICINE

## 2023-10-24 PROCEDURE — 3051F PR MOST RECENT HEMOGLOBIN A1C LEVEL 7.0 - < 8.0%: ICD-10-PCS | Mod: CPTII,S$GLB,, | Performed by: INTERNAL MEDICINE

## 2023-10-24 PROCEDURE — 3051F HG A1C>EQUAL 7.0%<8.0%: CPT | Mod: CPTII,S$GLB,, | Performed by: INTERNAL MEDICINE

## 2023-10-24 PROCEDURE — 85025 COMPLETE CBC W/AUTO DIFF WBC: CPT | Performed by: INTERNAL MEDICINE

## 2023-10-24 PROCEDURE — 3008F BODY MASS INDEX DOCD: CPT | Mod: CPTII,S$GLB,, | Performed by: INTERNAL MEDICINE

## 2023-10-24 PROCEDURE — 99999 PR PBB SHADOW E&M-EST. PATIENT-LVL III: ICD-10-PCS | Mod: PBBFAC,,, | Performed by: INTERNAL MEDICINE

## 2023-10-24 PROCEDURE — 95251 CONT GLUC MNTR ANALYSIS I&R: CPT | Mod: S$GLB,,, | Performed by: INTERNAL MEDICINE

## 2023-10-24 PROCEDURE — 3288F FALL RISK ASSESSMENT DOCD: CPT | Mod: CPTII,S$GLB,, | Performed by: INTERNAL MEDICINE

## 2023-10-24 PROCEDURE — 3066F PR DOCUMENTATION OF TREATMENT FOR NEPHROPATHY: ICD-10-PCS | Mod: CPTII,S$GLB,, | Performed by: INTERNAL MEDICINE

## 2023-10-24 PROCEDURE — 99999 PR PBB SHADOW E&M-EST. PATIENT-LVL V: CPT | Mod: PBBFAC,,, | Performed by: INTERNAL MEDICINE

## 2023-10-24 PROCEDURE — 99214 OFFICE O/P EST MOD 30 MIN: CPT | Mod: S$GLB,,, | Performed by: INTERNAL MEDICINE

## 2023-10-24 PROCEDURE — 3288F PR FALLS RISK ASSESSMENT DOCUMENTED: ICD-10-PCS | Mod: CPTII,S$GLB,, | Performed by: INTERNAL MEDICINE

## 2023-10-24 PROCEDURE — 3075F PR MOST RECENT SYSTOLIC BLOOD PRESS GE 130-139MM HG: ICD-10-PCS | Mod: CPTII,S$GLB,, | Performed by: INTERNAL MEDICINE

## 2023-10-24 PROCEDURE — 99999 PR PBB SHADOW E&M-EST. PATIENT-LVL III: CPT | Mod: PBBFAC,,, | Performed by: INTERNAL MEDICINE

## 2023-10-24 PROCEDURE — G0008 ADMIN INFLUENZA VIRUS VAC: HCPCS | Mod: S$GLB,,, | Performed by: INTERNAL MEDICINE

## 2023-10-24 PROCEDURE — 99214 PR OFFICE/OUTPT VISIT, EST, LEVL IV, 30-39 MIN: ICD-10-PCS | Mod: 25,S$GLB,, | Performed by: INTERNAL MEDICINE

## 2023-10-24 PROCEDURE — 3078F DIAST BP <80 MM HG: CPT | Mod: CPTII,S$GLB,, | Performed by: INTERNAL MEDICINE

## 2023-10-24 PROCEDURE — 1159F PR MEDICATION LIST DOCUMENTED IN MEDICAL RECORD: ICD-10-PCS | Mod: CPTII,S$GLB,, | Performed by: INTERNAL MEDICINE

## 2023-10-24 PROCEDURE — 83036 HEMOGLOBIN GLYCOSYLATED A1C: CPT | Performed by: INTERNAL MEDICINE

## 2023-10-24 PROCEDURE — 99214 PR OFFICE/OUTPT VISIT, EST, LEVL IV, 30-39 MIN: ICD-10-PCS | Mod: S$GLB,,, | Performed by: INTERNAL MEDICINE

## 2023-10-24 PROCEDURE — 90694 FLU VACCINE - QUADRIVALENT - ADJUVANTED: ICD-10-PCS | Mod: S$GLB,,, | Performed by: INTERNAL MEDICINE

## 2023-10-24 PROCEDURE — 80053 COMPREHEN METABOLIC PANEL: CPT | Performed by: INTERNAL MEDICINE

## 2023-10-24 RX ORDER — INSULIN LISPRO 100 [IU]/ML
INJECTION, SOLUTION INTRAVENOUS; SUBCUTANEOUS
Qty: 30 ML | Refills: 6 | Status: SHIPPED | OUTPATIENT
Start: 2023-10-24

## 2023-10-24 RX ORDER — DULOXETIN HYDROCHLORIDE 30 MG/1
60 CAPSULE, DELAYED RELEASE ORAL DAILY
Qty: 180 CAPSULE | Refills: 3 | Status: SHIPPED | OUTPATIENT
Start: 2023-10-24

## 2023-10-24 RX ORDER — LABETALOL 300 MG/1
300 TABLET, FILM COATED ORAL 2 TIMES DAILY
Qty: 180 TABLET | Refills: 3 | Status: SHIPPED | OUTPATIENT
Start: 2023-10-24

## 2023-10-24 RX ORDER — BLOOD-GLUCOSE,RECEIVER,CONT
EACH MISCELLANEOUS
COMMUNITY
End: 2023-10-30 | Stop reason: SDUPTHER

## 2023-10-24 NOTE — PATIENT INSTRUCTIONS
Continue:  Tresiba 45 units daily  Trulicity 4.5 mg weekly  Jardiance 10 mg daily    Increase:  Humalog 13 units three times daily with meals + correction

## 2023-10-24 NOTE — PROGRESS NOTES
"Subjective:       Patient ID: Annika Mac is a 72 y.o. female.    Chief Complaint: Leg Pain and Follow-up  This is a 72-year-old who presents today for follow-up she reports has seen her endocrinologist earlier today.  She reports that is using the G7 for diabetes monitoring which has helped her manage her diabetes better she is also taking her medicines regularly reports her short-acting insulin was increased a bit today she would like to get her flu shot.  She continues have issues with arthritis and pain in her knees is following with orthopedist and plans to make a follow-up she reports was trying to get her weight down before she was able to do surgery but has lost some weight overall.  She continues to follow with cardiology and has appt scheduled along with her mammogram . She reprots her home bp has been good   She had some situational stress with several family members who passed had consider therapy but decided against it due to transportation issues but she feels she is doing better overall    Leg Pain       Review of Systems   Constitutional:         Has g7 now for diabetes and saw endocinre today   Insulin adjusted up a bit short acting    Cardiovascular:         Fluid at times    Musculoskeletal:  Positive for arthralgias.        Knee pain        Objective:    Blood pressure 138/65, pulse 75, height 5' 5" (1.651 m), weight 130.7 kg (288 lb 2.3 oz), SpO2 99 %.   Physical Exam  Constitutional:       General: She is not in acute distress.     Comments: Walks with waliker    HENT:      Head: Normocephalic.      Mouth/Throat:      Pharynx: Oropharynx is clear.   Eyes:      General: No scleral icterus.  Cardiovascular:      Rate and Rhythm: Normal rate and regular rhythm.      Heart sounds: Murmur heard.      No friction rub. No gallop.      Comments: Breast : normal no masses or tenderness      Pulmonary:      Effort: Pulmonary effort is normal. No respiratory distress.      Breath sounds: Normal breath " sounds.   Abdominal:      General: Bowel sounds are normal.      Palpations: Abdomen is soft. There is no mass.      Tenderness: There is no abdominal tenderness.   Musculoskeletal:      Cervical back: Neck supple.      Comments: Trace edema    Skin:     Findings: No erythema.   Neurological:      Mental Status: She is alert.   Psychiatric:         Mood and Affect: Mood normal.         Assessment:       1. Type 2 diabetes mellitus with stage 3b chronic kidney disease and hypertension    2. Stage 3b chronic kidney disease    3. Primary hypertension    4. Hyperlipidemia, unspecified hyperlipidemia type    5. Primary osteoarthritis of right knee    6. History of stroke    7. RUFINA (obstructive sleep apnea)        Plan:       Annika was seen today for leg pain and follow-up.    Diagnoses and all orders for this visit:    Type 2 diabetes mellitus with stage 3b chronic kidney disease and hypertension  History of she continues to follow with endocrinology reports her medicines were recently adjusted she remains on insulin Trulicity and Jardiance    Stage 3b chronic kidney disease  She has upcoming labs remains on Jardiance    Primary hypertension  She continues to follow with her cardiologist reports needs for refill for her labetalol today    Hyperlipidemia, unspecified hyperlipidemia type  Remains on statin    Primary osteoarthritis of right knee  She is planning schedule a follow-up with orthopedist    History of stroke  Remains on aspirin statin    RUFINA (obstructive sleep apnea)  Continues use and follows sleep clnic    She follows with her podiatrist     Other orders  -     labetaloL (NORMODYNE) 300 MG tablet; Take 1 tablet (300 mg total) by mouth 2 (two) times daily.    Flu shot today mammogram as planned follow-up 6 months

## 2023-10-24 NOTE — PROGRESS NOTES
FOLLOW-UP VISIT    Subjective:      Chief Complaint: Follow-up for diabetes and obesity    HPI: Annika Mac is a 72 y.o. female who is here for a follow-up evaluation for type 2 diabetes    The patient's last visit with me was on 2/6/2023.    Past Medical History:   Diagnosis Date    Asthma     Chronic obstructive pulmonary disease, unspecified COPD type 1/14/2022    Constipation 10/3/2019    Deep vein thrombophlebitis of left leg 7/27/2012    Deep vein thrombosis     when she had a knee replacement    Diabetic foot ulcer with osteomyelitis     Encounter for blood transfusion     anemic     GERD (gastroesophageal reflux disease)     Obesity, diabetes, and hypertension syndrome 2/13/2019    Obstructive sleep apnea 7/27/2012    PAD (peripheral artery disease) 11/21/2013    Pressure ulcer of toe of left foot     Type 2 diabetes mellitus with obesity 8/19/2020    Type 2 diabetes mellitus with peripheral artery disease 8/19/2020         With regards to the diabetes, obesity:    She is been having issues with worsening neuropathic pain.  She currently takes Lyrica 150 mg b.i.d. and Cymbalta 30 mg daily.    Diabetes history:  Diagnosed: 1987.   Started oral agents then NPH and Regular insulin.   Converted to MDI with Lantus and Novolog in 2/2006 after knee surgery.   D/c TZD due to weight gain 7/08 and added Symlin. Stopped Symlin 2/09.   Januvia added 2/10. D/C Januvia 12/10 r/t cost; resumed though pt assistance in 2016.   Converted to U500 in 7/10. Now using Humalog 70/30 due to financial constraints.  However, she recently switched insurances to people self and is currently not paying anything for her insulin or Ozempic.      Known complications:  Peripheral neuropathy  Nephropathy  Toe ulcer with osteomyelitis  Hypoglycemia       Current regimen:  Tresiba 45 units daily  Humalog 11/11/11 units TID with meals + moderate  Trulicity 4.5 mg weekly  Jardiance 10 mg daily      Other agents tried:  NPH/R  U-500  insulin  Glargine, detemir, aspart  Kris Kingston    Hypoglycemia:  See above  Knows how to correct with 15 grams of carbs- juice, coke, or glucose tablets.     Denies missed doses.  Has Medicare Extra Help     Eats 3 meals daily, + snacks  Breakfast:  Grits/eggs/sausage georgie  Lunch:   Mashed potatoes, baked fish, corn, salad  Dinner:  Half cup rice, beans, baked chicken and greens.  Drinks:  Water, unsweet tea; Gatorade zero.    No formal exercise. Doing some exercises while seated.        Last visit with Diabetes Educator: : 10/06/2023    She is monitoring her blood glucose by Dexcom G7.  Data over the previous two weeks were downloaded and reviewed.  Interpretation:  Postprandial excursions with breakfast lunch and dinner are noted.  Basal insulin dose looks appropriate.        Diabetes Management Status    Statin: Taking  ACE/ARB: Taking    Screening or Prevention Patient's value Goal Complete/Controlled?   HgA1C Testing and Control   Lab Results   Component Value Date    HGBA1C 7.7 (H) 01/05/2023      Annually/Less than 8% No   Lipid profile : 01/05/2023 Annually Yes   LDL control Lab Results   Component Value Date    LDLCALC 78.2 01/05/2023    Annually/Less than 100 mg/dl  Yes   Nephropathy screening Lab Results   Component Value Date    LABMICR 67.0 01/05/2023     Lab Results   Component Value Date    PROTEINUA Negative 11/13/2019     Lab Results   Component Value Date    UTPCR Unable to calculate 11/13/2019      Annually Yes   Blood pressure BP Readings from Last 1 Encounters:   10/24/23 138/65    Less than 140/90 Yes   Dilated retinal exam : 02/28/2023 - Dr. Crook Annually Yes   Foot exam   : 09/29/2023 Annually Yes     Lab Results   Component Value Date    MICALBCREAT 35.3 (H) 01/05/2023    MICALBCREAT 76.9 (H) 04/11/2022    MICALBCREAT 270.1 (H) 01/07/2022    MICALBCREAT 8.7 01/04/2021    MICALBCREAT 11.9 05/01/2019         Blood pressure at home running good for the most part. 120-130/60-70s  on her most recent log.      Reviewed past medical, family, social history and updated as appropriate.        Objective:     Vitals:    10/24/23 0930   BP: 130/64   Pulse: 82     BP Readings from Last 5 Encounters:   10/24/23 138/65   10/24/23 130/64   09/29/23 133/72   09/21/23 134/62   07/27/23 122/76       Physical Exam  Vitals and nursing note reviewed.   Constitutional:       General: She is not in acute distress.     Appearance: She is well-developed. She is obese.   HENT:      Head: Normocephalic and atraumatic.   Eyes:      General:         Right eye: No discharge.         Left eye: No discharge.      Conjunctiva/sclera: Conjunctivae normal.   Neck:      Thyroid: No thyromegaly.      Trachea: No tracheal deviation.   Cardiovascular:      Rate and Rhythm: Normal rate.   Pulmonary:      Effort: Pulmonary effort is normal. No respiratory distress.   Musculoskeletal:      Comments: No digital clubbing or extremity cyanosis   Neurological:      Mental Status: She is alert and oriented to person, place, and time.      Coordination: Coordination normal.   Psychiatric:         Behavior: Behavior normal.         Wt Readings from Last 50 Encounters:   10/24/23 130.7 kg (288 lb 2.3 oz)   10/24/23 130.7 kg (288 lb 4 oz)   09/29/23 130.9 kg (288 lb 9.3 oz)   09/21/23 130.9 kg (288 lb 9.3 oz)   07/27/23 132.3 kg (291 lb 10.7 oz)   06/29/23 131 kg (288 lb 12.8 oz)   06/15/23 131 kg (288 lb 12.8 oz)   06/07/23 130.9 kg (288 lb 9.3 oz)   06/07/23 131 kg (288 lb 12.8 oz)   05/16/23 132 kg (291 lb 0.1 oz)   04/26/23 132 kg (291 lb)   04/20/23 135 kg (297 lb 9.9 oz)   04/20/23 132.5 kg (292 lb 1.8 oz)   03/14/23 133.8 kg (294 lb 15.6 oz)   02/28/23 133.8 kg (294 lb 15.6 oz)   02/23/23 133 kg (293 lb 3.4 oz)   02/06/23 130.7 kg (288 lb 4 oz)   01/12/23 134.3 kg (296 lb)   01/05/23 133.2 kg (293 lb 10.4 oz)   12/12/22 126.6 kg (279 lb)   12/01/22 130 kg (286 lb 9.6 oz)   12/01/22 130.3 kg (287 lb 2.4 oz)   11/30/22 132.3 kg (291  lb 10.7 oz)   11/08/22 128 kg (282 lb 3.2 oz)   11/01/22 135.2 kg (298 lb)   10/14/22 132.8 kg (292 lb 12.3 oz)   10/14/22 134.3 kg (296 lb)   09/14/22 134.7 kg (296 lb 15.4 oz)   09/02/22 132.2 kg (291 lb 5.4 oz)   08/31/22 133.8 kg (295 lb 1.4 oz)   08/23/22 133.7 kg (294 lb 12.1 oz)   07/14/22 133.7 kg (294 lb 11.2 oz)   06/17/22 134.5 kg (296 lb 8.3 oz)   06/13/22 134 kg (295 lb 6.7 oz)   05/19/22 127 kg (280 lb)   05/16/22 127 kg (279 lb 15.8 oz)   05/12/22 127 kg (280 lb)   05/09/22 131 kg (288 lb 11.1 oz)   04/19/22 132.5 kg (292 lb)   04/18/22 132.7 kg (292 lb 8.8 oz)   04/18/22 132.7 kg (292 lb 10.6 oz)   04/11/22 132 kg (291 lb 0.1 oz)   04/05/22 132 kg (291 lb)   04/01/22 133.1 kg (293 lb 6.2 oz)   03/28/22 132.4 kg (291 lb 14.2 oz)   03/25/22 132.4 kg (291 lb 14.2 oz)   03/22/22 133.9 kg (295 lb 3.1 oz)   03/14/22 132 kg (291 lb)   03/14/22 132.3 kg (291 lb 10.7 oz)   03/14/22 132.3 kg (291 lb 10.7 oz)       Lab Results   Component Value Date    HGBA1C 7.7 (H) 01/05/2023     Lab Results   Component Value Date    CHOL 145 01/05/2023    HDL 36 (L) 01/05/2023    LDLCALC 78.2 01/05/2023    TRIG 154 (H) 01/05/2023    CHOLHDL 24.8 01/05/2023     Lab Results   Component Value Date     05/16/2023    K 4.2 05/16/2023     05/16/2023    CO2 22 (L) 05/16/2023     (H) 05/16/2023    BUN 42 (H) 05/16/2023    CREATININE 1.6 (H) 05/16/2023    CALCIUM 10.1 05/16/2023    PROT 6.4 05/16/2023    ALBUMIN 3.6 05/16/2023    BILITOT 0.3 05/16/2023    ALKPHOS 114 05/16/2023    AST 29 05/16/2023    ALT 24 05/16/2023    ANIONGAP 11 05/16/2023    ESTGFRAFRICA 29.9 (A) 06/13/2022    EGFRNONAA 26.0 (A) 06/13/2022    TSH 1.365 09/08/2020      Lab Results   Component Value Date    MICALBCREAT 35.3 (H) 01/05/2023       Assessment/Plan:     Type 2 diabetes 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    Currently  meeting glycemic target:  Nearly    Patient was recently seen by diabetes education    Medication changes:   Continue:  Tresiba 45 units daily  Trulicity 4.5 mg weekly  Jardiance 10 mg daily    Increase:  Humalog 13 units three times daily with meals + correction    Reviewed patient's current insulin regimen. Clarified proper insulin dose and timing in relation to meals, etc. Insulin injection sites and proper rotation instructed.      Advised frequent self blood glucose monitoring. Patient encouraged to document glucose results and bring them to every clinic visit.    Hypoglycemia precautions discussed.     Discussed diet and exercise.    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  Rare hypoglycemia noted over the previous two weeks.    Type 2 diabetes mellitus with both eyes affected by proliferative retinopathy and macular edema, with long-term current use of insulin  Following with Dr. Crook    Anemia in chronic kidney disease (CKD)  This may artificially lower her A1c.    Isolated proteinuria  Urine protein had previously been normal.  Repeat urine protein was still elevated, although significantly improved.  We will continue to monitor for now.  Continue Jardiance.    Peripheral autonomic neuropathy due to DM  Increase Cymbalta to 60 mg daily.  Continue Lyrica 150 mg b.i.d..    Refer to pain management to consider Qutenza.     RTC in 4 months with lab work.

## 2023-10-24 NOTE — ASSESSMENT & PLAN NOTE
Increase Cymbalta to 60 mg daily.  Continue Lyrica 150 mg b.i.d..    Refer to pain management to consider Qutenza.

## 2023-10-24 NOTE — ASSESSMENT & PLAN NOTE
Reviewed goals of therapy are to get the best control we can without hypoglycemia.    Currently meeting glycemic target:  Nearly    Patient was recently seen by diabetes education    Medication changes:   Continue:  · Tresiba 45 units daily  · Trulicity 4.5 mg weekly  · Jardiance 10 mg daily    Increase:   Humalog 13 units three times daily with meals + correction    Reviewed patient's current insulin regimen. Clarified proper insulin dose and timing in relation to meals, etc. Insulin injection sites and proper rotation instructed.      Advised frequent self blood glucose monitoring. Patient encouraged to document glucose results and bring them to every clinic visit.    Hypoglycemia precautions discussed.     Discussed diet and exercise.    Diabetes health maintenance topics are addressed in the HPI    Lab Results   Component Value Date    HGBA1C 7.7 (H) 01/05/2023    HGBA1C 7.4 (H) 08/24/2022    HGBA1C 8.1 (H) 04/11/2022

## 2023-10-24 NOTE — ASSESSMENT & PLAN NOTE
Urine protein had previously been normal.  Repeat urine protein was still elevated, although significantly improved.  We will continue to monitor for now.  Continue Jardiance.

## 2023-10-30 ENCOUNTER — TELEPHONE (OUTPATIENT)
Dept: ENDOCRINOLOGY | Facility: HOSPITAL | Age: 73
End: 2023-10-30
Payer: COMMERCIAL

## 2023-10-30 DIAGNOSIS — E11.42 TYPE 2 DIABETES MELLITUS WITH DIABETIC POLYNEUROPATHY, UNSPECIFIED WHETHER LONG TERM INSULIN USE: Primary | ICD-10-CM

## 2023-10-30 RX ORDER — BLOOD-GLUCOSE SENSOR
1 EACH MISCELLANEOUS
Qty: 3 EACH | Refills: 11 | Status: SHIPPED | OUTPATIENT
Start: 2023-10-30 | End: 2024-10-29

## 2023-10-30 RX ORDER — BLOOD-GLUCOSE,RECEIVER,CONT
EACH MISCELLANEOUS
Qty: 1 EACH | Refills: 0 | Status: SHIPPED | OUTPATIENT
Start: 2023-10-30

## 2023-10-30 NOTE — TELEPHONE ENCOUNTER
----- Message from Vangie Rodarte MA sent at 10/30/2023  1:37 PM CDT -----  Regarding: FW: Medication  Pt is calling and stated she would like a Dexcom g7 to be called in to pharmacy. Pharmacy gave her g6. Please advise   ----- Message -----  From: Tatyana Sol  Sent: 10/30/2023  10:49 AM CDT  To: [facility] Staff  Subject: Medication                                       Pt is calling to get Dexcom G-7 supplies called in to pharmacy. Asking for an urgent call back        [facility] - [address] [address]  Phone: [phone] Fax: [phone]

## 2023-11-14 ENCOUNTER — TELEPHONE (OUTPATIENT)
Dept: PAIN MEDICINE | Facility: CLINIC | Age: 73
End: 2023-11-14
Payer: COMMERCIAL

## 2023-11-14 ENCOUNTER — OFFICE VISIT (OUTPATIENT)
Dept: PAIN MEDICINE | Facility: CLINIC | Age: 73
End: 2023-11-14
Payer: MEDICARE

## 2023-11-14 ENCOUNTER — HOSPITAL ENCOUNTER (OUTPATIENT)
Dept: RADIOLOGY | Facility: HOSPITAL | Age: 73
Discharge: HOME | End: 2023-11-14
Attending: STUDENT IN AN ORGANIZED HEALTH CARE EDUCATION/TRAINING PROGRAM
Payer: MEDICARE

## 2023-11-14 VITALS
BODY MASS INDEX: 48 KG/M2 | WEIGHT: 288.13 LBS | HEART RATE: 69 BPM | DIASTOLIC BLOOD PRESSURE: 67 MMHG | HEIGHT: 65 IN | SYSTOLIC BLOOD PRESSURE: 120 MMHG

## 2023-11-14 DIAGNOSIS — M47.816 LUMBAR SPONDYLOSIS: Primary | ICD-10-CM

## 2023-11-14 DIAGNOSIS — M47.816 LUMBAR SPONDYLOSIS: ICD-10-CM

## 2023-11-14 DIAGNOSIS — M79.644 PAIN OF RIGHT THUMB: ICD-10-CM

## 2023-11-14 DIAGNOSIS — E11.42 TYPE 2 DIABETES MELLITUS WITH DIABETIC POLYNEUROPATHY, UNSPECIFIED WHETHER LONG TERM INSULIN USE: ICD-10-CM

## 2023-11-14 DIAGNOSIS — M79.644 PAIN OF RIGHT THUMB: Primary | ICD-10-CM

## 2023-11-14 DIAGNOSIS — M47.816 LUMBAR FACET ARTHROPATHY: ICD-10-CM

## 2023-11-14 PROCEDURE — 1159F PR MEDICATION LIST DOCUMENTED IN MEDICAL RECORD: ICD-10-PCS | Mod: CPTII,S$GLB,, | Performed by: STUDENT IN AN ORGANIZED HEALTH CARE EDUCATION/TRAINING PROGRAM

## 2023-11-14 PROCEDURE — 3008F BODY MASS INDEX DOCD: CPT | Mod: CPTII,S$GLB,, | Performed by: STUDENT IN AN ORGANIZED HEALTH CARE EDUCATION/TRAINING PROGRAM

## 2023-11-14 PROCEDURE — 3066F NEPHROPATHY DOC TX: CPT | Mod: CPTII,S$GLB,, | Performed by: STUDENT IN AN ORGANIZED HEALTH CARE EDUCATION/TRAINING PROGRAM

## 2023-11-14 PROCEDURE — 3008F PR BODY MASS INDEX (BMI) DOCUMENTED: ICD-10-PCS | Mod: CPTII,S$GLB,, | Performed by: STUDENT IN AN ORGANIZED HEALTH CARE EDUCATION/TRAINING PROGRAM

## 2023-11-14 PROCEDURE — 1101F PT FALLS ASSESS-DOCD LE1/YR: CPT | Mod: CPTII,S$GLB,, | Performed by: STUDENT IN AN ORGANIZED HEALTH CARE EDUCATION/TRAINING PROGRAM

## 2023-11-14 PROCEDURE — 3078F PR MOST RECENT DIASTOLIC BLOOD PRESSURE < 80 MM HG: ICD-10-PCS | Mod: CPTII,S$GLB,, | Performed by: STUDENT IN AN ORGANIZED HEALTH CARE EDUCATION/TRAINING PROGRAM

## 2023-11-14 PROCEDURE — 3060F PR POS MICROALBUMINURIA RESULT DOCUMENTED/REVIEW: ICD-10-PCS | Mod: CPTII,S$GLB,, | Performed by: STUDENT IN AN ORGANIZED HEALTH CARE EDUCATION/TRAINING PROGRAM

## 2023-11-14 PROCEDURE — 3060F POS MICROALBUMINURIA REV: CPT | Mod: CPTII,S$GLB,, | Performed by: STUDENT IN AN ORGANIZED HEALTH CARE EDUCATION/TRAINING PROGRAM

## 2023-11-14 PROCEDURE — 3288F FALL RISK ASSESSMENT DOCD: CPT | Mod: CPTII,S$GLB,, | Performed by: STUDENT IN AN ORGANIZED HEALTH CARE EDUCATION/TRAINING PROGRAM

## 2023-11-14 PROCEDURE — 1101F PR PT FALLS ASSESS DOC 0-1 FALLS W/OUT INJ PAST YR: ICD-10-PCS | Mod: CPTII,S$GLB,, | Performed by: STUDENT IN AN ORGANIZED HEALTH CARE EDUCATION/TRAINING PROGRAM

## 2023-11-14 PROCEDURE — 99999 PR PBB SHADOW E&M-EST. PATIENT-LVL V: CPT | Mod: PBBFAC,,, | Performed by: STUDENT IN AN ORGANIZED HEALTH CARE EDUCATION/TRAINING PROGRAM

## 2023-11-14 PROCEDURE — 73130 XR HAND COMPLETE 3 VIEW RIGHT: ICD-10-PCS | Mod: 26,RT,, | Performed by: RADIOLOGY

## 2023-11-14 PROCEDURE — 99214 PR OFFICE/OUTPT VISIT, EST, LEVL IV, 30-39 MIN: ICD-10-PCS | Mod: S$GLB,,, | Performed by: STUDENT IN AN ORGANIZED HEALTH CARE EDUCATION/TRAINING PROGRAM

## 2023-11-14 PROCEDURE — 3078F DIAST BP <80 MM HG: CPT | Mod: CPTII,S$GLB,, | Performed by: STUDENT IN AN ORGANIZED HEALTH CARE EDUCATION/TRAINING PROGRAM

## 2023-11-14 PROCEDURE — 4010F PR ACE/ARB THEARPY RXD/TAKEN: ICD-10-PCS | Mod: CPTII,S$GLB,, | Performed by: STUDENT IN AN ORGANIZED HEALTH CARE EDUCATION/TRAINING PROGRAM

## 2023-11-14 PROCEDURE — 3051F PR MOST RECENT HEMOGLOBIN A1C LEVEL 7.0 - < 8.0%: ICD-10-PCS | Mod: CPTII,S$GLB,, | Performed by: STUDENT IN AN ORGANIZED HEALTH CARE EDUCATION/TRAINING PROGRAM

## 2023-11-14 PROCEDURE — 99999 PR PBB SHADOW E&M-EST. PATIENT-LVL V: ICD-10-PCS | Mod: PBBFAC,,, | Performed by: STUDENT IN AN ORGANIZED HEALTH CARE EDUCATION/TRAINING PROGRAM

## 2023-11-14 PROCEDURE — 1159F MED LIST DOCD IN RCRD: CPT | Mod: CPTII,S$GLB,, | Performed by: STUDENT IN AN ORGANIZED HEALTH CARE EDUCATION/TRAINING PROGRAM

## 2023-11-14 PROCEDURE — 1125F PR PAIN SEVERITY QUANTIFIED, PAIN PRESENT: ICD-10-PCS | Mod: CPTII,S$GLB,, | Performed by: STUDENT IN AN ORGANIZED HEALTH CARE EDUCATION/TRAINING PROGRAM

## 2023-11-14 PROCEDURE — 3066F PR DOCUMENTATION OF TREATMENT FOR NEPHROPATHY: ICD-10-PCS | Mod: CPTII,S$GLB,, | Performed by: STUDENT IN AN ORGANIZED HEALTH CARE EDUCATION/TRAINING PROGRAM

## 2023-11-14 PROCEDURE — 73130 X-RAY EXAM OF HAND: CPT | Mod: 26,RT,, | Performed by: RADIOLOGY

## 2023-11-14 PROCEDURE — 73130 X-RAY EXAM OF HAND: CPT | Mod: TC,FY,RT

## 2023-11-14 PROCEDURE — 4010F ACE/ARB THERAPY RXD/TAKEN: CPT | Mod: CPTII,S$GLB,, | Performed by: STUDENT IN AN ORGANIZED HEALTH CARE EDUCATION/TRAINING PROGRAM

## 2023-11-14 PROCEDURE — 3288F PR FALLS RISK ASSESSMENT DOCUMENTED: ICD-10-PCS | Mod: CPTII,S$GLB,, | Performed by: STUDENT IN AN ORGANIZED HEALTH CARE EDUCATION/TRAINING PROGRAM

## 2023-11-14 PROCEDURE — 3051F HG A1C>EQUAL 7.0%<8.0%: CPT | Mod: CPTII,S$GLB,, | Performed by: STUDENT IN AN ORGANIZED HEALTH CARE EDUCATION/TRAINING PROGRAM

## 2023-11-14 PROCEDURE — 99214 OFFICE O/P EST MOD 30 MIN: CPT | Mod: S$GLB,,, | Performed by: STUDENT IN AN ORGANIZED HEALTH CARE EDUCATION/TRAINING PROGRAM

## 2023-11-14 PROCEDURE — 1125F AMNT PAIN NOTED PAIN PRSNT: CPT | Mod: CPTII,S$GLB,, | Performed by: STUDENT IN AN ORGANIZED HEALTH CARE EDUCATION/TRAINING PROGRAM

## 2023-11-14 PROCEDURE — 3074F SYST BP LT 130 MM HG: CPT | Mod: CPTII,S$GLB,, | Performed by: STUDENT IN AN ORGANIZED HEALTH CARE EDUCATION/TRAINING PROGRAM

## 2023-11-14 PROCEDURE — 3074F PR MOST RECENT SYSTOLIC BLOOD PRESSURE < 130 MM HG: ICD-10-PCS | Mod: CPTII,S$GLB,, | Performed by: STUDENT IN AN ORGANIZED HEALTH CARE EDUCATION/TRAINING PROGRAM

## 2023-11-14 NOTE — PROGRESS NOTES
[facility] Established  Patient Evaluation    Referred by: Dr Marino Ragland  Reason for referral: Right Knee    CC:   Chief Complaint   Patient presents with    Low-back Pain    Leg Pain    Knee Pain   8/26/2014   Pain Disability Index (PDI) 34 21         Interval Updates:  11/14/2023: Annika Mac low back pain, right knee pain, pain at base of right thumb.  Low back pain is worse. She previously underwent Lumbar RFA with >50% relief for 1 year. Pain has now returned.  Discussed repeating RFA.   Her right knee needs to be replaced but needs to lose weight first.  Right thumb pain worse with typing. Pain noted at the base of the thumb. She is right handed.     06/07/2022 -  Bubba returns to clinic s/p  Lumbar Medial Branch Radiofrequency Ablation on 5/19/22 with 50% relief of her low back pain.  She is reporting new pain today this pain starts in her left buttocks and radiates down the posterior aspect of her leg to her foot.  Pain is worse when sitting patient currently therapy for low back states that some of the exercises are helping, however this pain is been ongoing for the last 2 weeks and she would like to be considered for injections to help.  She reports a pain intensity 8/10 today with a weekly range of 8-9/10.  The pain is described as Aching, Sharp and Shooting.  Pain is worsened by: sitting and improved by: nothing.      4/11/2022  -  Bubba returns to clinic s/p a Diagnostic Bilateral Lumbar MBB targeting L4/5 and L5/S1 on 4/2/22 with 90% relief with improved functionality.   She reports a pain intensity 6/10 today with a weekly range of 6-7/10.     Interval Updates:  3/28/2022 - - Ms. Mac is following up for Leg Pain, Low-back Pain, and Wrist Pain (Right wrist)  Pain is currently rate 8/10 with a weekly range 8-9/10.  Currently her low back pain and his worst we will begin addressing this 1st.  She has seen orthopedics/Dr. Linares he wants her to lose 10 lb prior to consenting her for  "right TKA.  Sparse her low back pain she actually received benefit from a previous bilateral lumbar facet injection 5% we targeted the L4-5 L5-S1 facet joints.  She complains of low back pain that has continued, pain is located across her low back with mild radiation into her legs bilaterally.  She denies any recent incident, denies any profound weakness, denies any bowel bladder dysfunction, denies any saddle anesthesia at this time.     5/21/21 - Ms. Mac returns to clinic for follow up visit reporting stable low back pain.  Patient is s/p Bilateral L4-5 and L5-S1 Lumbar Facet Injection on 5/6/21 with 75% relief for a" few days and then pain returned"  Pain intensity is currently 7/10.      HPI:   Annika Mac is a 70 y.o. female with numerous problems resulting from, or exacerbated by, morbid obesity who presents complaining of chronic low back pain.  She states longstanding low back pain with a minor component of pain radiating into the back of the legs, all way down to the calf, bilaterally.  She reports rapidly increasing back pain with standing or walking for more than 5 min, requiring her to sit and rest.  Pain decreases very quickly when resting.  In 2014, she received a bilateral facet steroid injection in the low back.  At that time, the provider documented 90% relief of pain.    Location: low back, left buttocks, left posterior leg to ankle  Onset: 2-3 months ago  Current Pain Score: 7/10  Daily Pain of Range: 5-8/10  Quality: Aching, Sharp and Shooting  Radiation: Buttocks  Worsened by: daily activity  Improved by: nothing    Previous Therapies:  PT/OT: Yes, and currently restarting PT  HEP: no  Interventions:   4/5/2022 Bilateral facet injection L4-5 and L5-S1 in 2014 with 90% relief  Surgery: Denies back surgery  Medications:   - NSAIDS:   - MSK Relaxants:   - TCAs:   - SNRIs:   - Topicals:   - Anticonvulsants:  - Opioids:     Current Pain Medications:  Lyrica 150 mg b.i.d.  Cymbalta 30 mg " "daily    Review of Systems:  Review of Systems   Constitutional:  Negative for chills and fever.   HENT:  Negative for nosebleeds.    Eyes:  Negative for blurred vision.   Respiratory:  Negative for hemoptysis.    Cardiovascular:  Negative for chest pain and palpitations.   Gastrointestinal:  Negative for heartburn and vomiting.   Genitourinary:  Negative for hematuria.   Musculoskeletal:  Positive for back pain and joint pain. Negative for falls and myalgias.   Skin:  Negative for rash.   Neurological:  Negative for tingling, focal weakness, seizures and loss of consciousness.   Endo/Heme/Allergies:  Does not bruise/bleed easily.   Psychiatric/Behavioral:  Negative for hallucinations.        History:    Current Outpatient Medications:     albuterol (PROAIR HFA) 90 mcg/actuation inhaler, Inhale 2 puffs into the lungs every 6 (six) hours as needed for Wheezing. Rescue, Disp: 18 g, Rfl: 6    allopurinoL (ZYLOPRIM) 300 MG tablet, Take 1 tablet (300 mg total) by mouth once daily., Disp: 90 tablet, Rfl: 4    amLODIPine (NORVASC) 10 MG tablet, Take 1 tablet (10 mg total) by mouth once daily., Disp: 90 tablet, Rfl: 3    ammonium lactate 12 % Crea, Apply twice daily to affected parts both feet as needed., Disp: 140 g, Rfl: 11    aspirin 81 MG Chew, Take 1 tablet (81 mg total) by mouth once daily., Disp: , Rfl: 0    BD ULTRA-FINE KIKA PEN NEEDLE 32 gauge x 5/32" Ndle, To use 4 times daily, Disp: 200 each, Rfl: 20    betamethasone dipropionate (DIPROLENE) 0.05 % cream, Apply topically 2 (two) times daily. Prn hand rash, Disp: 45 g, Rfl: 0    blood sugar diagnostic Strp, 1 each by Misc.(Non-Drug; Combo Route) route 3 (three) times daily. Dx code  E11.42 . Contour Next, Disp: 200 each, Rfl: 12    blood-glucose meter kit, 1 each by Other route once daily. Use daily. Insurance proffered one touch  E11.42 (Patient taking differently: 1 each by Other route once daily. Contour next .  Insurance proffered one touch  E11.42), Disp: 1 " each, Rfl: 0    blood-glucose sensor (DEXCOM G6 SENSOR) Terese, Use as Directed, Disp: 2 Device, Rfl: 11    blood-glucose transmitter (DEXCOM G6 TRANSMITTER) Terese, Every 3 months. Use as Directed to check blood glucose., Disp: 1 Device, Rfl: 3    chlorthalidone (HYGROTEN) 25 MG Tab, Take 1 tablet (25 mg total) by mouth once daily., Disp: 30 tablet, Rfl: 11    colchicine (COLCRYS) 0.6 mg tablet, TAKE 1 TABLET EVERY OTHER DAY, Disp: 45 tablet, Rfl: 3    diclofenac sodium (VOLTAREN) 1 % Gel, Apply 2 g topically 4 (four) times daily., Disp: 1 Tube, Rfl: 2    DULoxetine (CYMBALTA) 30 MG capsule, Take 1 capsule (30 mg total) by mouth once daily., Disp: 90 capsule, Rfl: 3    famotidine (PEPCID) 20 MG tablet, Take 1 tablet (20 mg total) by mouth 2 (two) times daily., Disp: 1 tablet, Rfl: 0    fexofenadine (ALLEGRA) 180 MG tablet, TAKE 1 TABLET BY MOUTH ONCE DAILY, Disp: 30 tablet, Rfl: 0    glucagon (GVOKE HYPOPEN 1-PACK) 0.5 mg/0.1 mL AtIn, Inject 1 Dose into the skin daily as needed (for severe hypoglycemia if you aren't able to treat by ingesting sugar). (Patient not taking: Reported on 5/17/2021), Disp: 1 Syringe, Rfl: 3    HYDROcodone-acetaminophen (NORCO) 7.5-325 mg per tablet, Take 1 tablet by mouth every 6 (six) hours as needed. (Patient not taking: Reported on 4/7/2021), Disp: 28 tablet, Rfl: 0    insulin NPH-insulin regular, 70/30, (NOVOLIN 70/30 U-100 INSULIN) 100 unit/mL (70-30) injection, 120 UNITS INTO THE SKIN WITH BREAKFAST, 110 UNITS WITH LUNCH, AND INJECT 90 UNITS WITH DINNER ; . (Patient taking differently: 110 UNITS INTO THE SKIN WITH BREAKFAST, 85 UNITS WITH LUNCH, AND INJECT 65 UNITS WITH DINNER ; .), Disp: 30 mL, Rfl: 3    irbesartan (AVAPRO) 300 MG tablet, Take 1 tablet (300 mg total) by mouth every evening., Disp: 90 tablet, Rfl: 3    ketoconazole (NIZORAL) 2 % cream, Apply topically once daily., Disp: 1 Tube, Rfl: 2    lancets 31 gauge Misc, 1 lancet by Misc.(Non-Drug; Combo Route)  route 4 (four) times daily. E11.42 . Prescribed one touch, Disp: 200 each, Rfl: 6    LIDOcaine HCL 2% (XYLOCAINE) 2 % jelly, Apply topically as needed. Apply topically once nightly to affected part of foot/feet. (Patient not taking: Reported on 5/17/2021), Disp: 30 mL, Rfl: 2    methylPREDNISolone (MEDROL DOSEPACK) 4 mg tablet, use as directed (Patient not taking: Reported on 5/17/2021), Disp: 1 Package, Rfl: 0    multivitamin (THERAGRAN) per tablet, Take 1 tablet by mouth once daily., Disp: , Rfl:     pregabalin (LYRICA) 150 MG capsule, Take 1 capsule (150 mg total) by mouth 2 (two) times daily., Disp: 180 capsule, Rfl: 3    semaglutide (OZEMPIC) 1 mg/dose (2 mg/1.5 mL) PnIj, Inject 0.75 mLs into the skin every 7 days. (1 mg every week), Disp: 9 mL, Rfl: 4    simvastatin (ZOCOR) 40 MG tablet, Take 1 tablet (40 mg total) by mouth every evening., Disp: 90 tablet, Rfl: 3    sodium bicarbonate 325 MG tablet, Take 2 tablets (650 mg total) by mouth 2 (two) times daily., Disp: 120 tablet, Rfl: 11    topiramate (TOPAMAX) 50 MG tablet, Take 1 tablet (50 mg total) by mouth 2 (two) times daily., Disp: 180 tablet, Rfl: 0    traMADol (ULTRAM) 50 mg tablet, Take 1 tablet (50 mg total) by mouth every 8 (eight) hours as needed., Disp: 90 tablet, Rfl: 3  No current facility-administered medications for this visit.    Facility-Administered Medications Ordered in Other Visits:     fentaNYL injection 25 mcg, 25 mcg, Intravenous, Q5 Min PRN, Desmond Fontana MD, 50 mcg at 01/08/21 0955    midazolam (VERSED) 1 mg/mL injection 0.5 mg, 0.5 mg, Intravenous, PRN, Desmond Fontana MD, 2 mg at 01/08/21 0955    Past Medical History:   Diagnosis Date    Asthma     Constipation 10/3/2019    Deep vein thrombophlebitis of left leg 7/27/2012    Deep vein thrombosis     when she had a knee replacement    Encounter for blood transfusion     anemic     GERD (gastroesophageal reflux disease)     Obesity, diabetes, and hypertension  syndrome 2019    Obstructive sleep apnea 2012    PAD (peripheral artery disease) 2013    Type 2 diabetes mellitus with obesity 2020    Type 2 diabetes mellitus with peripheral artery disease 2020       Past Surgical History:   Procedure Laterality Date    CATARACT EXTRACTION W/  INTRAOCULAR LENS IMPLANT Left 3/2002    left     CATARACT EXTRACTION W/  INTRAOCULAR LENS IMPLANT Right     OD dr. olivares     SECTION      COLONOSCOPY N/A 2018    Procedure: COLONOSCOPY;  Surgeon: Faizan Mac MD;  Location: [facility] ENDO (2ND FLR);  Service: Endoscopy;  Laterality: N/A;    CYST REMOVAL  2011    left side of face    CYSTOSCOPY W/ RETROGRADES Left 2020    Procedure: CYSTOSCOPY, WITH RETROGRADE PYELOGRAM;  Surgeon: Noman Rivas MD;  Location: [facility] 1ST FLR;  Service: Urology;  Laterality: Left;  30min    DE QUERVAIN'S RELEASE  2021    Procedure: RELEASE, HAND, FOR DEQUERVAIN'S TENOSYNOVITIS;  Surgeon: Marky Hagen MD;  Location: [facility];  Service: Orthopedics;;    EYE SURGERY Bilateral 2012    eye implants    GANGLION CYST EXCISION  2007    Right wrist    INJECTION OF FACET JOINT Bilateral 2021    Procedure: INJECTION, FACET JOINT--Bilateral L4-5, L5-S1;  Surgeon: Alireza Meraz Jr., MD;  Location: [facility] PAIN T;  Service: Pain Management;  Laterality: Bilateral;  Oral    INTERPOSITION ARTHROPLASTY OF CARPOMETACARPAL JOINTS Left 2021    Procedure: INTERPOSITION ARTHROPLASTY, CMC JOINT - left dequervains and cmc arthroplasty, arthrex;  Surgeon: Marky Hagen MD;  Location: [facility];  Service: Orthopedics;  Laterality: Left;    JOINT REPLACEMENT Left     Pan Retinal Photocoagulation Bilateral     Dr. Monterroso (Proliferative Diabetic Retinopathy)    TOTAL ABDOMINAL HYSTERECTOMY  1982    TOTAL KNEE ARTHROPLASTY  2006    left    TRIGGER FINGER RELEASE  2009       Family History   Problem Relation Age of Onset    Diabetes Mother      Hypertension Mother     Heart disease Father     Diabetes Sister     Thyroid disease Brother     Diabetes Brother     Hypertension Brother     No Known Problems Daughter     No Known Problems Son     No Known Problems Daughter     Amblyopia Neg Hx     Blindness Neg Hx     Glaucoma Neg Hx     Macular degeneration Neg Hx     Retinal detachment Neg Hx     Strabismus Neg Hx     Colon cancer Neg Hx     Esophageal cancer Neg Hx        Social History     Socioeconomic History    Marital status:      Spouse name: Heber    Number of children: 3    Years of education: Not on file    Highest education level: Not on file   Occupational History    Occupation:    Tobacco Use    Smoking status: Never Smoker    Smokeless tobacco: Never Used   Substance and Sexual Activity    Alcohol use: No    Drug use: No    Sexual activity: Yes     Partners: Male   Other Topics Concern    Not on file   Social History Narrative    , lives with family; 3 children, 4 grandchildren     Social Determinants of Health     Financial Resource Strain:     Difficulty of Paying Living Expenses:    Food Insecurity:     Worried About Running Out of Food in the Last Year:     Ran Out of Food in the Last Year:    Transportation Needs:     Lack of Transportation (Medical):     Lack of Transportation (Non-Medical):    Physical Activity:     Days of Exercise per Week:     Minutes of Exercise per Session:    Stress: No Stress Concern Present    Feeling of Stress : Not at all   Social Connections:     Frequency of Communication with Friends and Family:     Frequency of Social Gatherings with Friends and Family:     Attends [employer] Services:     Active Member of Clubs or Organizations:     Attends Club or Organization Meetings:     Marital Status:        Review of patient's allergies indicates:   Allergen Reactions    Iodinated contrast media Rash       Physical Exam:  There were no vitals filed for this visit.  General    Nursing note  and vitals reviewed.  Constitutional: She is oriented to person, place, and time. She appears well-developed and well-nourished. No distress.   HENT:   Head: Normocephalic and atraumatic.   Nose: Nose normal.   Eyes: Conjunctivae and EOM are normal. Pupils are equal, round, and reactive to light. Right eye exhibits no discharge. Left eye exhibits no discharge. No scleral icterus.   Neck: No JVD present.   Cardiovascular:  Intact distal pulses.            Pulmonary/Chest: Effort normal. No respiratory distress.   Abdominal: She exhibits no distension.   Neurological: She is alert and oriented to person, place, and time. Coordination normal.   Psychiatric: She has a normal mood and affect. Her behavior is normal. Judgment and thought content normal.     General Musculoskeletal Exam   Gait: antalgic (ambulates with rolling walker)       Right Knee Exam     Inspection   Swelling: present  Deformity: absent    Tenderness   The patient is tender to palpation of the medial joint line and lateral joint line.    Range of Motion   Extension:  normal Right knee extension grade: painful.  Flexion:  normal Right knee flexion: painful.    Other   Sensation: normal    Left Knee Exam     Inspection   Swelling: present  Deformity: absent    Tenderness   The patient tender to palpation of the medial joint line and lateral joint line.    Range of Motion   Extension:  normal Left knee extension grade: painful.  Flexion:  normal Left knee flexion: painful.    Other   Sensation: normalBack (L-Spine & T-Spine) / Neck (C-Spine) Exam     Tenderness Right paramedian tenderness of the Lower L-Spine. Left paramedian tenderness of the Lower L-Spine.     Back (L-Spine & T-Spine) Range of Motion   Back extension: facet loading is positive and exacerabtes/reproduces the patient's typical low back pain    Back flexion: limited ROM but partial relief of low back pain noted.     Spinal Sensation   Right Side Sensation  L-Spine Level: normal  Left  Side Sensation  L-Spine Level: normal    Other   She has no scoliosis .    Comments:  Pain with lumbar facet loading.       Right Hand/Wrist Exam     Comments:  Tenderness over the base of the right thumb.  No obviously swelling.  No erythema. Pain with ROM of the right thumb.           Muscle Strength   Right Lower Extremity   Hip Flexion: 5/5   Hip Extensors: 5/5  Quadriceps:  5/5   Hamstrin/5   Gastrocsoleus:  5/5   Left Lower Extremity   Hip Flexion: 5/5   Hip Extensors: 5/5  Quadriceps:  5/5   Hamstrin/5   Gastrocsoleus:  5/5     Reflexes     Left Side  Achilles:  2+  Quadriceps:  2+    Right Side   Achilles:  2+  Quadriceps:  2+      Imagin2021  X-ray Knee Ortho Right  CLINICAL HISTORY:  R KNEE; Pain in right knee  TECHNIQUE:  AP standing of both knees, Merchant views of both knees as well as a lateral view of the right knee were performed.  COMPARISON:  2018  FINDINGS:  No acute fracture seen.  No significant suprapatellar joint effusion.  Tricompartmental osteophyte formation with advanced narrowing of the medial and patellofemoral compartments.  Previous left knee arthroplasty with metallic components in good position.  Impression:  Osteoarthritis with advanced joint space narrowing.    Labs:  BMP  Lab Results   Component Value Date     2021    K 4.0 2021     2021    CO2 26 2021    BUN 30 (H) 2021    CREATININE 1.8 (H) 2021    CALCIUM 9.0 2021    ANIONGAP 8 2021    ESTGFRAFRICA 32.2 (A) 2021    EGFRNONAA 27.9 (A) 2021     Lab Results   Component Value Date    ALT 18 2021    AST 24 2021    ALKPHOS 122 2021    BILITOT 0.3 2021     Wt Readings from Last 5 Encounters:   21 (!) 142.9 kg (315 lb 0.6 oz)   21 95.3 kg (210 lb)   04/15/21 (!) 145.6 kg (320 lb 15.8 oz)   21 (!) 145.6 kg (321 lb)   21 (!) 146.5 kg (323 lb)       Assessment:  Problem List Items Addressed This  Visit          Neuro    DDD (degenerative disc disease), lumbar    Chronic pain       Orthopedic    Chronic back pain          Other Visit Diagnoses       Lumbar spondylosis    -  Primary    Arthropathy of facet joints at multiple levels        Primary osteoarthritis of both knees                4/28/21 - Annika Mac is a 70 y.o. female with diffuse chronic pain related to degenerative joint disease and degenerative spine disease complicated by morbid obesity and likely sedentary lifestyle as well.  Patient is currently receiving treatment for weight reduction by the Bariatric Medicine Department and follows up with Rheumatology, along with a multitude of other providers.  She received a bilateral lumbar facet injection for low back pain in 2014 which provided her 90% relief based on postoperative assessment.  I recommended repeating this injection.  While patient was referred for knee pain, she chose to focus on low back pain during today's encounter; however, we can treat her knee pain in the future if that is something she wants to discuss.  When she was last in this clinic in 2014 she received recommendations to lose weight and establish a daily home exercise regimen, both of which remain relevant today.    5/21/2021- Annika Mac is a 70 y.o. female who  has a past medical history of Asthma, Constipation (10/3/2019), Deep vein thrombophlebitis of left leg (7/27/2012), Deep vein thrombosis, Encounter for blood transfusion, GERD (gastroesophageal reflux disease), Obesity, diabetes, and hypertension syndrome (2/13/2019), Obstructive sleep apnea (7/27/2012), PAD (peripheral artery disease) (11/21/2013), Type 2 diabetes mellitus with obesity (8/19/2020), and Type 2 diabetes mellitus with peripheral artery disease (8/19/2020).  By history and examination this patient has chronic low back pain without  radiculopathy.  The underlying cause cause is facet arthritis, degenerative disc disease, muscle dysfunction and  deconditioning.  Pathology is confirmed by imaging.  We discussed the underlying diagnoses and multiple treatment options including non-opioid medications, injections, physical therapy, home exercise, activity modification / rest and weight loss.  The risks and benefits of each treatment option were discussed and all questions were answered.  I discussed with patient I will consult Dr. Meraz to see if he would like to move forward with scheduling the bilateral lumbar RFA, patient verbalized understanding and agreed.  Until that I recommended that she continue with physical therapy as well as stabbing home exercise plan that involves daily stretching and core strengthening.    03/28/[id number]-xcfk-old morbidly obese female with a history of chronic low back pain related to facet arthropathy, DDD complicated by morbid obesity and a sedentary lifestyle.  She has benefited from facet steroid injections targeting L4-5 and L5-S1, patient today on recommending a diagnostic bilateral lumbar MBB targeting L4-5 L5-S1 and considering her for a lumbar RFA following.  The patient was amenable to this plan also provide her with information explain the procedure even further.   Lastly, we did discuss considering her for a right knee GN block and RFA to help with chronic right knee pian, she is being considered for a right TKA with Dr. Ragland once she has los approximately 10 lbs.     04/11/[id number]-fmvn-old female with a history of chronic low back pain related to facet arthropathy, DDD complicated by morbid obesity and sedentary lifestyle.  She is status post her 1st diagnostic lumbar MBB targeting L4-5 L5-S1 with 9% relief and improved functionality.  Discussed today that will now recommend a 2nd diagnostic lumbar MBB targeting the above-mentioned levels.  If appropriate we will schedule for lumbar RFA.    06/07/[id number]-edpo-old female status post a lumbar RFA targeting L4-5 L5-S1 she did receive 50% relief of her low back pain.   She did have a new complaint today pain in the left buttocks radiating down the posterior left leg, based on  history and exam her pain is likely related to piriformis syndrome, her pain is complicated by her morbid obesity and sedentary lifestyle.  I recommended establishing a HE plan that focuses on piriformis stretching exercises, I will also provided her some HE sheets.  Based on Previous lumbar MRI she does have disc bulges at L4-5 and L5-S1 couple with her facet arthropathy that results in mild spinal canal stenosis at L4-5.  At L5-S1 she has moderate left neural foraminal narrowing which could be the cause of her left leg pain however this is unclear at this point.  Discussed with her that if her pain does not get better over time with stretching that we can update her lumbar MRI and possibly consider a lumbar NAYLA based on the results.    11/14/2023 -  Annikarach Mac presents for follow up of her low back pain.  She reports >50% relief x 1 year following lumbar RFA targeting L4-5 L5-S1. She would like to repeat the procedure.  She also complains of right thumb pain.  I will order an x-ray and refer her to Ortho hand for possible interventions.  We discussed possible genicular nerve blocks/RFA in the future.     : Reviewed and consistent with medication use as prescribed.    Treatment Plan:   Procedures:  Repeat Lumbar RFA  L4-5 L5-S1   Consider left piriformis injection in the future                     Consider lumbar NAYLA however will need a updated image prior to.               Consider a right knee GNB and RFA in the future.   PT/OT/HEP:    - continue PT as scheduled, home exercise sheets specifically given for piriformis stretches.   - daily cardio   - daily stretching   - focused HEP  Medications: No changes recommended at this time.  Labs: reviewed and medications are appropriately dosed for current hepatorenal function.  Imaging:     X-ray of the right hand ordered today.   Consider updating lumbar  imaging if patient does not get better over the next 6-8 weeks  Referral: Ref to Ortho Hand for possible hand injections  Follow Up:  6-8 weeks or sooner if needed    Alejandrina Roa, DO   Inter[facility] Pain Management      Disclaimer: This note was partly generated using dictation software which may occasionally result in transcription errors.

## 2023-11-15 ENCOUNTER — TELEPHONE (OUTPATIENT)
Dept: PAIN MEDICINE | Facility: CLINIC | Age: 73
End: 2023-11-15
Payer: COMMERCIAL

## 2023-11-15 NOTE — TELEPHONE ENCOUNTER
----- Message from Ashley Epperson MA sent at 11/14/2023  4:48 PM CST -----  Regarding: FW: pt advise  Contact: Pt    ----- Message -----  From: Jazmin Villegas  Sent: 11/14/2023   1:36 PM CST  To: [facility] Staff  Subject: pt advise                                        Pt would like to speak w/ you regarding Procedure on 12/13    Please call to advise    Phone [phone]    Thank You

## 2023-11-16 ENCOUNTER — PATIENT MESSAGE (OUTPATIENT)
Dept: PAIN MEDICINE | Facility: CLINIC | Age: 73
End: 2023-11-16
Payer: COMMERCIAL

## 2023-11-16 DIAGNOSIS — I10 ESSENTIAL HYPERTENSION: ICD-10-CM

## 2023-11-17 DIAGNOSIS — I10 ESSENTIAL HYPERTENSION: Primary | ICD-10-CM

## 2023-11-17 DIAGNOSIS — I51.89 DIASTOLIC DYSFUNCTION: ICD-10-CM

## 2023-11-17 RX ORDER — IRBESARTAN 300 MG/1
TABLET ORAL
Qty: 90 TABLET | Refills: 3 | Status: SHIPPED | OUTPATIENT
Start: 2023-11-17

## 2023-11-17 NOTE — TELEPHONE ENCOUNTER
No care due was identified.  [facility] Embedded Care Due Messages. Reference number: [id number].   11/16/2023 9:21:18 PM CST

## 2023-11-17 NOTE — TELEPHONE ENCOUNTER
Refill Routing Note   Medication(s) are not appropriate for processing by [facility] for the following reason(s):        Required labs abnormal    [facility] action(s):  Defer             Pharmacist review requested: Yes     Appointments  past 12m or future 3m with PCP    Date Provider   Last Visit   10/24/2023 Mamie Cotton MD   Next Visit   4/24/2024 Mamie Cotton MD   ED visits in past 90 days: 0        Note composed:9:21 PM 11/16/2023

## 2023-11-20 ENCOUNTER — LAB VISIT (OUTPATIENT)
Dept: LAB | Facility: HOSPITAL | Age: 73
End: 2023-11-20
Attending: INTERNAL MEDICINE
Payer: MEDICARE

## 2023-11-20 ENCOUNTER — OFFICE VISIT (OUTPATIENT)
Dept: HEMATOLOGY/ONCOLOGY | Facility: CLINIC | Age: 73
End: 2023-11-20
Payer: MEDICARE

## 2023-11-20 VITALS
TEMPERATURE: 98 F | HEART RATE: 69 BPM | RESPIRATION RATE: 18 BRPM | OXYGEN SATURATION: 96 % | SYSTOLIC BLOOD PRESSURE: 152 MMHG | DIASTOLIC BLOOD PRESSURE: 67 MMHG | HEIGHT: 65 IN | BODY MASS INDEX: 48.08 KG/M2 | WEIGHT: 288.56 LBS

## 2023-11-20 DIAGNOSIS — D63.1 ANEMIA IN STAGE 3 CHRONIC KIDNEY DISEASE, UNSPECIFIED WHETHER STAGE 3A OR 3B CKD: ICD-10-CM

## 2023-11-20 DIAGNOSIS — D63.1 ANEMIA IN STAGE 3 CHRONIC KIDNEY DISEASE, UNSPECIFIED WHETHER STAGE 3A OR 3B CKD: Primary | ICD-10-CM

## 2023-11-20 DIAGNOSIS — N18.30 ANEMIA IN STAGE 3 CHRONIC KIDNEY DISEASE, UNSPECIFIED WHETHER STAGE 3A OR 3B CKD: ICD-10-CM

## 2023-11-20 DIAGNOSIS — N18.30 ANEMIA IN STAGE 3 CHRONIC KIDNEY DISEASE, UNSPECIFIED WHETHER STAGE 3A OR 3B CKD: Primary | ICD-10-CM

## 2023-11-20 LAB
ALBUMIN SERPL BCP-MCNC: 3.4 G/DL (ref 3.5–5.2)
ALP SERPL-CCNC: 120 U/L (ref 55–135)
ALT SERPL W/O P-5'-P-CCNC: 12 U/L (ref 10–44)
ANION GAP SERPL CALC-SCNC: 6 MMOL/L (ref 8–16)
AST SERPL-CCNC: 13 U/L (ref 10–40)
BASOPHILS # BLD AUTO: 0.04 K/UL (ref 0–0.2)
BASOPHILS NFR BLD: 0.7 % (ref 0–1.9)
BILIRUB SERPL-MCNC: 0.2 MG/DL (ref 0.1–1)
BUN SERPL-MCNC: 30 MG/DL (ref 8–23)
CALCIUM SERPL-MCNC: 9.7 MG/DL (ref 8.7–10.5)
CHLORIDE SERPL-SCNC: 108 MMOL/L (ref 95–110)
CO2 SERPL-SCNC: 25 MMOL/L (ref 23–29)
CREAT SERPL-MCNC: 1.7 MG/DL (ref 0.5–1.4)
DIFFERENTIAL METHOD: ABNORMAL
EOSINOPHIL # BLD AUTO: 0.3 K/UL (ref 0–0.5)
EOSINOPHIL NFR BLD: 5.5 % (ref 0–8)
ERYTHROCYTE [DISTWIDTH] IN BLOOD BY AUTOMATED COUNT: 14.6 % (ref 11.5–14.5)
EST. GFR  (NO RACE VARIABLE): 31.5 ML/MIN/1.73 M^2
GLUCOSE SERPL-MCNC: 189 MG/DL (ref 70–110)
HCT VFR BLD AUTO: 32.4 % (ref 37–48.5)
HGB BLD-MCNC: 10.5 G/DL (ref 12–16)
IMM GRANULOCYTES # BLD AUTO: 0.03 K/UL (ref 0–0.04)
IMM GRANULOCYTES NFR BLD AUTO: 0.5 % (ref 0–0.5)
LYMPHOCYTES # BLD AUTO: 1.5 K/UL (ref 1–4.8)
LYMPHOCYTES NFR BLD: 25.4 % (ref 18–48)
MCH RBC QN AUTO: 27.8 PG (ref 27–31)
MCHC RBC AUTO-ENTMCNC: 32.4 G/DL (ref 32–36)
MCV RBC AUTO: 86 FL (ref 82–98)
MONOCYTES # BLD AUTO: 0.6 K/UL (ref 0.3–1)
MONOCYTES NFR BLD: 9.9 % (ref 4–15)
NEUTROPHILS # BLD AUTO: 3.5 K/UL (ref 1.8–7.7)
NEUTROPHILS NFR BLD: 58 % (ref 38–73)
NRBC BLD-RTO: 0 /100 WBC
PLATELET # BLD AUTO: 245 K/UL (ref 150–450)
PMV BLD AUTO: 11.1 FL (ref 9.2–12.9)
POTASSIUM SERPL-SCNC: 4.1 MMOL/L (ref 3.5–5.1)
PROT SERPL-MCNC: 6.3 G/DL (ref 6–8.4)
RBC # BLD AUTO: 3.78 M/UL (ref 4–5.4)
SODIUM SERPL-SCNC: 139 MMOL/L (ref 136–145)
WBC # BLD AUTO: 5.98 K/UL (ref 3.9–12.7)

## 2023-11-20 PROCEDURE — 3008F PR BODY MASS INDEX (BMI) DOCUMENTED: ICD-10-PCS | Mod: CPTII,S$GLB,, | Performed by: INTERNAL MEDICINE

## 2023-11-20 PROCEDURE — 3066F NEPHROPATHY DOC TX: CPT | Mod: CPTII,S$GLB,, | Performed by: INTERNAL MEDICINE

## 2023-11-20 PROCEDURE — 99213 OFFICE O/P EST LOW 20 MIN: CPT | Mod: S$GLB,,, | Performed by: INTERNAL MEDICINE

## 2023-11-20 PROCEDURE — 1126F PR PAIN SEVERITY QUANTIFIED, NO PAIN PRESENT: ICD-10-PCS | Mod: CPTII,S$GLB,, | Performed by: INTERNAL MEDICINE

## 2023-11-20 PROCEDURE — 3008F BODY MASS INDEX DOCD: CPT | Mod: CPTII,S$GLB,, | Performed by: INTERNAL MEDICINE

## 2023-11-20 PROCEDURE — 3077F PR MOST RECENT SYSTOLIC BLOOD PRESSURE >= 140 MM HG: ICD-10-PCS | Mod: CPTII,S$GLB,, | Performed by: INTERNAL MEDICINE

## 2023-11-20 PROCEDURE — 80053 COMPREHEN METABOLIC PANEL: CPT | Performed by: INTERNAL MEDICINE

## 2023-11-20 PROCEDURE — 99999 PR PBB SHADOW E&M-EST. PATIENT-LVL V: CPT | Mod: PBBFAC,,, | Performed by: INTERNAL MEDICINE

## 2023-11-20 PROCEDURE — 3060F PR POS MICROALBUMINURIA RESULT DOCUMENTED/REVIEW: ICD-10-PCS | Mod: CPTII,S$GLB,, | Performed by: INTERNAL MEDICINE

## 2023-11-20 PROCEDURE — 85025 COMPLETE CBC W/AUTO DIFF WBC: CPT | Performed by: INTERNAL MEDICINE

## 2023-11-20 PROCEDURE — 1159F MED LIST DOCD IN RCRD: CPT | Mod: CPTII,S$GLB,, | Performed by: INTERNAL MEDICINE

## 2023-11-20 PROCEDURE — 1101F PR PT FALLS ASSESS DOC 0-1 FALLS W/OUT INJ PAST YR: ICD-10-PCS | Mod: CPTII,S$GLB,, | Performed by: INTERNAL MEDICINE

## 2023-11-20 PROCEDURE — 3288F PR FALLS RISK ASSESSMENT DOCUMENTED: ICD-10-PCS | Mod: CPTII,S$GLB,, | Performed by: INTERNAL MEDICINE

## 2023-11-20 PROCEDURE — 1159F PR MEDICATION LIST DOCUMENTED IN MEDICAL RECORD: ICD-10-PCS | Mod: CPTII,S$GLB,, | Performed by: INTERNAL MEDICINE

## 2023-11-20 PROCEDURE — 3066F PR DOCUMENTATION OF TREATMENT FOR NEPHROPATHY: ICD-10-PCS | Mod: CPTII,S$GLB,, | Performed by: INTERNAL MEDICINE

## 2023-11-20 PROCEDURE — 3288F FALL RISK ASSESSMENT DOCD: CPT | Mod: CPTII,S$GLB,, | Performed by: INTERNAL MEDICINE

## 2023-11-20 PROCEDURE — 3077F SYST BP >= 140 MM HG: CPT | Mod: CPTII,S$GLB,, | Performed by: INTERNAL MEDICINE

## 2023-11-20 PROCEDURE — 3078F PR MOST RECENT DIASTOLIC BLOOD PRESSURE < 80 MM HG: ICD-10-PCS | Mod: CPTII,S$GLB,, | Performed by: INTERNAL MEDICINE

## 2023-11-20 PROCEDURE — 3078F DIAST BP <80 MM HG: CPT | Mod: CPTII,S$GLB,, | Performed by: INTERNAL MEDICINE

## 2023-11-20 PROCEDURE — 3051F HG A1C>EQUAL 7.0%<8.0%: CPT | Mod: CPTII,S$GLB,, | Performed by: INTERNAL MEDICINE

## 2023-11-20 PROCEDURE — 4010F ACE/ARB THERAPY RXD/TAKEN: CPT | Mod: CPTII,S$GLB,, | Performed by: INTERNAL MEDICINE

## 2023-11-20 PROCEDURE — 1101F PT FALLS ASSESS-DOCD LE1/YR: CPT | Mod: CPTII,S$GLB,, | Performed by: INTERNAL MEDICINE

## 2023-11-20 PROCEDURE — 3051F PR MOST RECENT HEMOGLOBIN A1C LEVEL 7.0 - < 8.0%: ICD-10-PCS | Mod: CPTII,S$GLB,, | Performed by: INTERNAL MEDICINE

## 2023-11-20 PROCEDURE — 99999 PR PBB SHADOW E&M-EST. PATIENT-LVL V: ICD-10-PCS | Mod: PBBFAC,,, | Performed by: INTERNAL MEDICINE

## 2023-11-20 PROCEDURE — 4010F PR ACE/ARB THEARPY RXD/TAKEN: ICD-10-PCS | Mod: CPTII,S$GLB,, | Performed by: INTERNAL MEDICINE

## 2023-11-20 PROCEDURE — 3060F POS MICROALBUMINURIA REV: CPT | Mod: CPTII,S$GLB,, | Performed by: INTERNAL MEDICINE

## 2023-11-20 PROCEDURE — 1126F AMNT PAIN NOTED NONE PRSNT: CPT | Mod: CPTII,S$GLB,, | Performed by: INTERNAL MEDICINE

## 2023-11-20 PROCEDURE — 36415 COLL VENOUS BLD VENIPUNCTURE: CPT | Performed by: INTERNAL MEDICINE

## 2023-11-20 PROCEDURE — 99213 PR OFFICE/OUTPT VISIT, EST, LEVL III, 20-29 MIN: ICD-10-PCS | Mod: S$GLB,,, | Performed by: INTERNAL MEDICINE

## 2023-11-20 NOTE — PROGRESS NOTES
Subjective:       Patient ID: Annika Mac is a 72 y.o. female.    Chief Complaint: No chief complaint on file.    Anemia     Mrs. Mac returns today for follow up.  I had last seen her on 5/16/2023.  Briefly, she is a 72 year old morbidly obese female who comes today for follow up for her longstanding anemia.    Multiple stool samples in the past have been negative for occult blood.   Of note, she had an EGD, colonoscopy, and video capsule swallow 4 years ago.  A tubular adenoma was removed from her colon while her EGD had shown patchy mildly erythematous mucosa without bleeding in the gastric body.  Biopsies were negative for malignancy.  The video capsule swallow was unremarkable although it was not an optimal study.    Her CBC today shows a white count of 5,890 per cubic mm, hemoglobin 10.8 grams/deciliter, hematocrit 32.4 %, MCV 86, and platelets 245K.  CMP shows a creatinine of 1.8 mg/dL with a BUN of 30.  A recent EGD in April 2023 was normal.  Colonoscopy showed 4 polyps that were removed. Biopsy of all 4 of them showed tubular adenomas.  Several stool samples including three more today have been negative for occult blood    Review of Systems  Overall she feels fair. She again complains of her longstanding arthritic pains involving primarily her knees, her back, and her hips.  She uses a wheelchair for ambulation.  She complains of shortness of breath with exertion.  She denies any anxiety, depression, easy bruising, fevers, chills, night sweats, weight loss, nausea, vomiting, diarrhea, diplopia, blurred vision, epistaxis, hematuria, or headaches.      Objective:      Physical Exam  GENERAL: She is alert, oriented to time, place, person, pleasant, well nourished, in no acute physical distress.  She is able to climb to the examination table with assistance.  VITAL SIGNS: Reviewed.   HEENT: Normal. There are no nasal, oral, lip, gingival, auricular, lid, conjunctival lesions. Pupils are equal, reactive to  light and   accommodation and extraocular muscle movements are intact. Mucosae are moist and pink, and there is no thrush.  Maxillary teeth are missing.  NECK: Supple without JVD, adenopathy, or thyromegaly.   LUNGS: Clear to auscultation without wheezing, rales, or rhonchi.   CARDIOVASCULAR: Reveals an S1, S2, no murmurs, no rubs, no gallops.   ABDOMEN: Obese, soft, nontender, without organomegaly. Bowel sounds are present. A median abdominal scar is seen extending from the umbilicus to the symphysis pubis.   EXTREMITIES: No cyanosis or clubbing. There is 1(+) edema at the ankle level bilaterally.  The left foot is wrapped.  SKIN: Does not have petechiae, rashes, induration, or ecchymoses.   NEUROLOGIC: Motor function is 5/5, DTRs are 0 to 1+ bilaterally, symmetrical, and cranial nerves within normal limits.   LYMPHATIC: There is no cervical, axillary, or supraclavicular adenopathy.       Assessment:       1. Anemia in stage 3 chronic kidney disease      2. Morbid obesity due to excess calories     3. 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 Mac.  I suspect that her anemia is multifactorial.    We again discussed the option of a bone marrow biopsy but she opted to defer it for later.  I will see her again in 6 months with repeat labs to be drawn 1 day prior.  Her multiple questions were answered to her satisfaction.    Route Chart for Scheduling    Med Onc Chart Routing      Follow up with physician 6 months.   Follow up with KATIE    Infusion scheduling note    Injection scheduling note    Labs CBC, CMP and ferritin   Scheduling:  Preferred lab:  Lab interval:     Imaging    Pharmacy appointment    Other referrals            Therapy Plan Information  None

## 2023-11-27 ENCOUNTER — OFFICE VISIT (OUTPATIENT)
Dept: CARDIOLOGY | Facility: CLINIC | Age: 73
End: 2023-11-27
Payer: MEDICARE

## 2023-11-27 ENCOUNTER — HOSPITAL ENCOUNTER (OUTPATIENT)
Dept: CARDIOLOGY | Facility: CLINIC | Age: 73
Discharge: HOME | End: 2023-11-27
Payer: MEDICARE

## 2023-11-27 VITALS
BODY MASS INDEX: 48.19 KG/M2 | OXYGEN SATURATION: 95 % | WEIGHT: 289.25 LBS | HEIGHT: 65 IN | DIASTOLIC BLOOD PRESSURE: 63 MMHG | SYSTOLIC BLOOD PRESSURE: 137 MMHG | HEART RATE: 72 BPM

## 2023-11-27 DIAGNOSIS — Z79.4 TYPE 2 DIABETES MELLITUS WITH DIABETIC POLYNEUROPATHY, WITH LONG-TERM CURRENT USE OF INSULIN: ICD-10-CM

## 2023-11-27 DIAGNOSIS — I10 ESSENTIAL HYPERTENSION: ICD-10-CM

## 2023-11-27 DIAGNOSIS — I77.819 ECTATIC AORTA: ICD-10-CM

## 2023-11-27 DIAGNOSIS — E11.42 TYPE 2 DIABETES MELLITUS WITH DIABETIC POLYNEUROPATHY, WITH LONG-TERM CURRENT USE OF INSULIN: ICD-10-CM

## 2023-11-27 DIAGNOSIS — Z01.810 PRE-OPERATIVE CARDIOVASCULAR EXAMINATION: Primary | ICD-10-CM

## 2023-11-27 DIAGNOSIS — R07.9 CHEST PAIN, UNSPECIFIED TYPE: ICD-10-CM

## 2023-11-27 DIAGNOSIS — I51.89 DIASTOLIC DYSFUNCTION: Chronic | ICD-10-CM

## 2023-11-27 DIAGNOSIS — E11.69 DYSLIPIDEMIA ASSOCIATED WITH TYPE 2 DIABETES MELLITUS: ICD-10-CM

## 2023-11-27 DIAGNOSIS — G47.33 OSA (OBSTRUCTIVE SLEEP APNEA): ICD-10-CM

## 2023-11-27 DIAGNOSIS — E66.01 MORBID OBESITY: ICD-10-CM

## 2023-11-27 DIAGNOSIS — N18.32 STAGE 3B CHRONIC KIDNEY DISEASE: ICD-10-CM

## 2023-11-27 DIAGNOSIS — Z86.73 HISTORY OF STROKE: ICD-10-CM

## 2023-11-27 DIAGNOSIS — E78.5 DYSLIPIDEMIA ASSOCIATED WITH TYPE 2 DIABETES MELLITUS: ICD-10-CM

## 2023-11-27 DIAGNOSIS — I51.89 DIASTOLIC DYSFUNCTION: ICD-10-CM

## 2023-11-27 PROBLEM — R06.09 DOE (DYSPNEA ON EXERTION): Status: RESOLVED | Noted: 2022-03-04 | Resolved: 2023-11-27

## 2023-11-27 PROCEDURE — 99999 PR PBB SHADOW E&M-EST. PATIENT-LVL V: ICD-10-PCS | Mod: PBBFAC,,, | Performed by: PHYSICIAN ASSISTANT

## 2023-11-27 PROCEDURE — 3008F BODY MASS INDEX DOCD: CPT | Mod: CPTII,S$GLB,, | Performed by: PHYSICIAN ASSISTANT

## 2023-11-27 PROCEDURE — 3078F PR MOST RECENT DIASTOLIC BLOOD PRESSURE < 80 MM HG: ICD-10-PCS | Mod: CPTII,S$GLB,, | Performed by: PHYSICIAN ASSISTANT

## 2023-11-27 PROCEDURE — 4010F PR ACE/ARB THEARPY RXD/TAKEN: ICD-10-PCS | Mod: CPTII,S$GLB,, | Performed by: PHYSICIAN ASSISTANT

## 2023-11-27 PROCEDURE — 99999 PR PBB SHADOW E&M-EST. PATIENT-LVL V: CPT | Mod: PBBFAC,,, | Performed by: PHYSICIAN ASSISTANT

## 2023-11-27 PROCEDURE — 3288F PR FALLS RISK ASSESSMENT DOCUMENTED: ICD-10-PCS | Mod: CPTII,S$GLB,, | Performed by: PHYSICIAN ASSISTANT

## 2023-11-27 PROCEDURE — 1126F AMNT PAIN NOTED NONE PRSNT: CPT | Mod: CPTII,S$GLB,, | Performed by: PHYSICIAN ASSISTANT

## 2023-11-27 PROCEDURE — 3051F HG A1C>EQUAL 7.0%<8.0%: CPT | Mod: CPTII,S$GLB,, | Performed by: PHYSICIAN ASSISTANT

## 2023-11-27 PROCEDURE — 4010F ACE/ARB THERAPY RXD/TAKEN: CPT | Mod: CPTII,S$GLB,, | Performed by: PHYSICIAN ASSISTANT

## 2023-11-27 PROCEDURE — 1160F PR REVIEW ALL MEDS BY PRESCRIBER/CLIN PHARMACIST DOCUMENTED: ICD-10-PCS | Mod: CPTII,S$GLB,, | Performed by: PHYSICIAN ASSISTANT

## 2023-11-27 PROCEDURE — 1159F PR MEDICATION LIST DOCUMENTED IN MEDICAL RECORD: ICD-10-PCS | Mod: CPTII,S$GLB,, | Performed by: PHYSICIAN ASSISTANT

## 2023-11-27 PROCEDURE — 3075F SYST BP GE 130 - 139MM HG: CPT | Mod: CPTII,S$GLB,, | Performed by: PHYSICIAN ASSISTANT

## 2023-11-27 PROCEDURE — 1101F PT FALLS ASSESS-DOCD LE1/YR: CPT | Mod: CPTII,S$GLB,, | Performed by: PHYSICIAN ASSISTANT

## 2023-11-27 PROCEDURE — 99214 OFFICE O/P EST MOD 30 MIN: CPT | Mod: S$GLB,,, | Performed by: PHYSICIAN ASSISTANT

## 2023-11-27 PROCEDURE — 3078F DIAST BP <80 MM HG: CPT | Mod: CPTII,S$GLB,, | Performed by: PHYSICIAN ASSISTANT

## 2023-11-27 PROCEDURE — 1126F PR PAIN SEVERITY QUANTIFIED, NO PAIN PRESENT: ICD-10-PCS | Mod: CPTII,S$GLB,, | Performed by: PHYSICIAN ASSISTANT

## 2023-11-27 PROCEDURE — 3288F FALL RISK ASSESSMENT DOCD: CPT | Mod: CPTII,S$GLB,, | Performed by: PHYSICIAN ASSISTANT

## 2023-11-27 PROCEDURE — 1159F MED LIST DOCD IN RCRD: CPT | Mod: CPTII,S$GLB,, | Performed by: PHYSICIAN ASSISTANT

## 2023-11-27 PROCEDURE — 3008F PR BODY MASS INDEX (BMI) DOCUMENTED: ICD-10-PCS | Mod: CPTII,S$GLB,, | Performed by: PHYSICIAN ASSISTANT

## 2023-11-27 PROCEDURE — 1101F PR PT FALLS ASSESS DOC 0-1 FALLS W/OUT INJ PAST YR: ICD-10-PCS | Mod: CPTII,S$GLB,, | Performed by: PHYSICIAN ASSISTANT

## 2023-11-27 PROCEDURE — 3051F PR MOST RECENT HEMOGLOBIN A1C LEVEL 7.0 - < 8.0%: ICD-10-PCS | Mod: CPTII,S$GLB,, | Performed by: PHYSICIAN ASSISTANT

## 2023-11-27 PROCEDURE — 3075F PR MOST RECENT SYSTOLIC BLOOD PRESS GE 130-139MM HG: ICD-10-PCS | Mod: CPTII,S$GLB,, | Performed by: PHYSICIAN ASSISTANT

## 2023-11-27 PROCEDURE — 3060F POS MICROALBUMINURIA REV: CPT | Mod: CPTII,S$GLB,, | Performed by: PHYSICIAN ASSISTANT

## 2023-11-27 PROCEDURE — 99214 PR OFFICE/OUTPT VISIT, EST, LEVL IV, 30-39 MIN: ICD-10-PCS | Mod: S$GLB,,, | Performed by: PHYSICIAN ASSISTANT

## 2023-11-27 PROCEDURE — 1160F RVW MEDS BY RX/DR IN RCRD: CPT | Mod: CPTII,S$GLB,, | Performed by: PHYSICIAN ASSISTANT

## 2023-11-27 PROCEDURE — 3066F NEPHROPATHY DOC TX: CPT | Mod: CPTII,S$GLB,, | Performed by: PHYSICIAN ASSISTANT

## 2023-11-27 PROCEDURE — 3060F PR POS MICROALBUMINURIA RESULT DOCUMENTED/REVIEW: ICD-10-PCS | Mod: CPTII,S$GLB,, | Performed by: PHYSICIAN ASSISTANT

## 2023-11-27 PROCEDURE — 3066F PR DOCUMENTATION OF TREATMENT FOR NEPHROPATHY: ICD-10-PCS | Mod: CPTII,S$GLB,, | Performed by: PHYSICIAN ASSISTANT

## 2023-11-27 NOTE — PROGRESS NOTES
"    General Cardiology Clinic Note  Reason for Visit: Pre-op  Last Clinic Visit: 6/7/2023 with Dr. Peter Hodges    HPI:   Annika Mac is a 72 y.o. female who presents for pre-op clearance.     Problems:  Hypertension  Hyperlipidemia  DM Type 2  CKD Stage 3  CVA in 2003  Hx of remote DVT (provoked)  RUFINA on a CPAP   Chronic back pain   Chronic anemia    HPI:  Patient presents for clearance for lumbar RFA. She reports CP in right upper chest x 1.5 weeks. She states it is a "tightness" that comes and goes, and typically occurs with exertion or if she "over does it". Lasts minutes before resolving. It is non-radiating. She also reports chronic MCFADDEN that has been gradually worsening and feels her heart races with exertion. She had to take multiple breaks when walking to clinic from parking garage to catch her breath. She also has significant leg and back pain with exertion. She ambulates with a rolling walker and is significantly debilitated; she is not able to achieve >4 METS. She reports sleeping with head elevated for years. She has chronic mild pedal edema that fluctuates. She denies lightheadedness and syncope and sustained palpitations. She checks her BP at home daily and states it is controlled. This morning is was 124/60.     ROS:      Review of Systems   Constitutional: Negative for diaphoresis, malaise/fatigue, weight gain and weight loss.   HENT:  Negative for nosebleeds.    Eyes:  Negative for vision loss in left eye, vision loss in right eye and visual disturbance.   Cardiovascular:  Positive for chest pain, dyspnea on exertion, leg swelling and orthopnea. Negative for claudication, irregular heartbeat, near-syncope, palpitations, paroxysmal nocturnal dyspnea and syncope.   Respiratory:  Positive for shortness of breath and snoring. Negative for cough, sleep disturbances due to breathing and wheezing.    Hematologic/Lymphatic: Negative for bleeding problem. Does not bruise/bleed easily.   Skin:  Negative for " poor wound healing and rash.   Musculoskeletal:  Positive for back pain. Negative for muscle cramps and myalgias.   Gastrointestinal:  Negative for bloating, abdominal pain, diarrhea, heartburn, melena, nausea and vomiting.   Genitourinary:  Negative for hematuria and nocturia.   Neurological:  Negative for brief paralysis, dizziness, headaches, light-headedness, numbness and weakness.   Psychiatric/Behavioral:  Negative for depression.    Allergic/Immunologic: Negative for hives.       PMH:     Past Medical History:   Diagnosis Date    Asthma     Chronic obstructive pulmonary disease, unspecified COPD type 2022    Constipation 10/3/2019    Deep vein thrombophlebitis of left leg 2012    Deep vein thrombosis     when she had a knee replacement    Diabetic foot ulcer with osteomyelitis     Encounter for blood transfusion     anemic     GERD (gastroesophageal reflux disease)     Obesity, diabetes, and hypertension syndrome 2019    Obstructive sleep apnea 2012    PAD (peripheral artery disease) 2013    Pressure ulcer of toe of left foot     Type 2 diabetes mellitus with obesity 2020    Type 2 diabetes mellitus with peripheral artery disease 2020     Past Surgical History:   Procedure Laterality Date    CATARACT EXTRACTION W/  INTRAOCULAR LENS IMPLANT Left 3/2002    left     CATARACT EXTRACTION W/  INTRAOCULAR LENS IMPLANT Right     OD dr. olivares     SECTION      COLONOSCOPY N/A 2018    Procedure: COLONOSCOPY;  Surgeon: Faizan Mac MD;  Location: [address]);  Service: Endoscopy;  Laterality: N/A;    COLONOSCOPY N/A 2023    Procedure: COLONOSCOPY;  Surgeon: Pa Zamora MD;  Location: [address]);  Service: Endoscopy;  Laterality: N/A;    CYST REMOVAL  2011    left side of face    CYSTOSCOPY W/ RETROGRADES Left 2020    Procedure: CYSTOSCOPY, WITH RETROGRADE PYELOGRAM;  Surgeon: Noman Rivas MD;  Location: [facility]  1ST FLR;  Service: Urology;  Laterality: Left;  30min    DE QUERVAIN'S RELEASE  1/8/2021    Procedure: RELEASE, HAND, FOR DEQUERVAIN'S TENOSYNOVITIS;  Surgeon: Marky Hagen MD;  Location: [facility];  Service: Orthopedics;;    ESOPHAGOGASTRODUODENOSCOPY N/A 4/26/2023    Procedure: EGD (ESOPHAGOGASTRODUODENOSCOPY);  Surgeon: Pa Zamora MD;  Location: [facility] ENDO (2ND FLR);  Service: Endoscopy;  Laterality: N/A;  suprep-inst mail-bmi 49.09-tb  4/20/23- No answer for precall- [place]    EYE SURGERY Bilateral 2012    eye implants    GANGLION CYST EXCISION  11/13/2007    Right wrist    INJECTION OF ANESTHETIC AGENT AROUND MEDIAL BRANCH NERVES INNERVATING LUMBAR FACET JOINT Bilateral 4/5/2022    Procedure: Block-nerve-medial branch-lumbar bilateral L4-5 and L5-S1;  Surgeon: Alireza Meraz Jr., MD;  Location: [facility];  Service: Pain Management;  Laterality: Bilateral;  pt is diabetic   asa    INJECTION OF ANESTHETIC AGENT AROUND MEDIAL BRANCH NERVES INNERVATING LUMBAR FACET JOINT Bilateral 4/19/2022    Procedure: Block-nerve-medial branch-lumbar bilaterl L4-5 and L5-S1;  Surgeon: Alireza Meraz Jr., MD;  Location: [facility];  Service: Pain Management;  Laterality: Bilateral;  pt is diabetic     INJECTION OF FACET JOINT Bilateral 5/6/2021    Procedure: INJECTION, FACET JOINT--Bilateral L4-5, L5-S1;  Surgeon: Alireza Meraz Jr., MD;  Location: [facility];  Service: Pain Management;  Laterality: Bilateral;  Oral    INTERPOSITION ARTHROPLASTY OF CARPOMETACARPAL JOINTS Left 1/8/2021    Procedure: INTERPOSITION ARTHROPLASTY, CMC JOINT - left dequervains and cmc arthroplasty, arthrex;  Surgeon: Marky Hagen MD;  Location: [facility];  Service: Orthopedics;  Laterality: Left;    JOINT REPLACEMENT Left     Pan Retinal Photocoagulation Bilateral     Dr. Monterroso (Proliferative Diabetic Retinopathy)    RADIOFREQUENCY THERMOCOAGULATION Right 5/12/2022    Procedure: RADIOFREQUENCY THERMAL COAGULATION RIGHT L4-5,  "L5-S1 (IV Sedation);  Surgeon: Alireza Meraz Jr., MD;  Location: [facility] PAIN MGT;  Service: Pain Management;  Laterality: Right;  diabetic    RADIOFREQUENCY THERMOCOAGULATION Left 2022    Procedure: RADIOFREQUENCY THERMAL COAGULATION LEFT L4-5, L5-S1 (IV Sedation);  Surgeon: Alireza Meraz Jr., MD;  Location: [facility];  Service: Pain Management;  Laterality: Left;  diabetic    TOTAL ABDOMINAL HYSTERECTOMY  1982    TOTAL KNEE ARTHROPLASTY  2006    left    TRIGGER FINGER RELEASE  2009     Allergies:     Review of patient's allergies indicates:   Allergen Reactions    Clindamycin Hives and Swelling    Iodinated contrast media Rash     Medications:     Current Outpatient Medications on File Prior to Visit   Medication Sig Dispense Refill    albuterol (PROAIR HFA) 90 mcg/actuation inhaler Inhale 2 puffs into the lungs every 6 (six) hours as needed for Wheezing. Rescue 18 g 6    amLODIPine (NORVASC) 10 MG tablet TAKE 1 TABLET BY MOUTH ONCE  DAILY 90 tablet 3    aspirin 81 MG Chew Take 1 tablet (81 mg total) by mouth once daily.  0    atorvastatin (LIPITOR) 20 MG tablet Take 1 tablet (20 mg total) by mouth once daily. 90 tablet 3    BD ULTRA-FINE KIKA PEN NEEDLE 32 gauge x 5/32" Ndle USE 4 TIMES DAILY 200 each 20    blood sugar diagnostic Strp 1 strip 4 times daily. Contour Next. 200 strip 11    blood-glucose meter kit 1 each by Other route once daily. Use daily. Insurance proffered one touch  E11.42 1 each 0    blood-glucose sensor (DEXCOM G7 SENSOR MISC) by Misc.(Non-Drug; Combo Route) route.      blood-glucose transmitter (DEXCOM G6 TRANSMITTER) Terese 1 Device by Misc.(Non-Drug; Combo Route) route every 3 (three) months. 1 each 3    chlorthalidone (HYGROTEN) 25 MG Tab Take 1 tablet (25 mg total) by mouth once daily. 30 tablet 1    CONSTULOSE 10 gram/15 mL solution SMARTSI Milliliter(s) By Mouth Twice Daily PRN      DEXCOM G7  Misc Use as directed 1 each 0    DEXCOM G7 SENSOR Terese 1 " Device by Misc.(Non-Drug; Combo Route) route every 10 days. 3 each 11    dulaglutide (TRULICITY) 4.5 mg/0.5 mL pen injector Inject 4.5 mg into the skin every 7 days. 12 pen 3    DULoxetine (CYMBALTA) 30 MG capsule Take 2 capsules (60 mg total) by mouth once daily. 180 capsule 3    empagliflozin (JARDIANCE) 10 mg tablet Take 1 tablet (10 mg total) by mouth once daily. 90 tablet 3    famotidine (PEPCID) 20 MG tablet Take 1 tablet (20 mg total) by mouth 2 (two) times daily. 1 tablet 0    finasteride (PROSCAR) 5 mg tablet Take 1 tablet (5 mg total) by mouth once daily. 30 tablet 11    glucagon (GVOKE HYPOPEN 1-PACK) 0.5 mg/0.1 mL AtIn Inject 1 Dose into the skin daily as needed (for severe hypoglycemia if you aren't able to treat by ingesting sugar). 1 Syringe 3    insulin lispro (HUMALOG KWIKPEN INSULIN) 100 unit/mL pen INJECT 13 UNITS INTO THE  SKIN 3 TIMES DAILY BEFORE  MEALS PLUS SLIDING SCALE -  MAX TOTAL DAILY DOSE 70  UNITS 30 mL 6    irbesartan (AVAPRO) 300 MG tablet TAKE 1 TABLET BY MOUTH IN THE  EVENING 90 tablet 3    ketoconazole (NIZORAL) 2 % shampoo Wash hair with medicated shampoo at least 2x/week - let sit on scalp at least 5 minutes prior to rinsing 120 mL 5    labetaloL (NORMODYNE) 300 MG tablet Take 1 tablet (300 mg total) by mouth 2 (two) times daily. 180 tablet 3    lancets 31 gauge Misc 1 lancet by Misc.(Non-Drug; Combo Route) route 4 (four) times daily. E11.42 . Prescribed one touch 200 each 6    multivitamin (THERAGRAN) per tablet Take 1 tablet by mouth once daily.      pregabalin (LYRICA) 150 MG capsule TAKE 1 CAPSULE BY MOUTH TWICE  DAILY 60 capsule 5    sodium bicarbonate 325 MG tablet Take 2 tablets (650 mg total) by mouth 2 (two) times daily. 120 tablet 11    TRESIBA FLEXTOUCH U-100 100 unit/mL (3 mL) insulin pen INJECT 40 UNITS INTO THE SKIN  ONCE DAILY 15 mL 8    [DISCONTINUED] insulin glargine, TOUJEO, (TOUJEO SOLOSTAR) 300 unit/mL (1.5 mL) InPn pen INJECT 40 UNITS INTO THE SKIN EVERY  "EVENING TITRATE UP AS DIRECTED. 4.5 mL 0     Current Facility-Administered Medications on File Prior to Visit   Medication Dose Route Frequency Provider Last Rate Last Admin    triamcinolone acetonide injection 10 mg  10 mg Intradermal Once Susie Curran MD        triamcinolone acetonide injection 10 mg  10 mg Intradermal Once Susie Curran MD         Social History:     Social History     Tobacco Use    Smoking status: Never    Smokeless tobacco: Never   Substance Use Topics    Alcohol use: No     Family History:     Family History   Problem Relation Age of Onset    Diabetes Mother     Hypertension Mother     Heart disease Father     Diabetes Sister     No Known Problems Sister     No Known Problems Brother     Thyroid disease Brother     Diabetes Brother     Hypertension Brother     No Known Problems Daughter     No Known Problems Daughter     No Known Problems Son     Amblyopia Neg Hx     Blindness Neg Hx     Glaucoma Neg Hx     Macular degeneration Neg Hx     Retinal detachment Neg Hx     Strabismus Neg Hx     Colon cancer Neg Hx     Esophageal cancer Neg Hx      Physical Exam:   /63   Pulse 72   Ht 5' 5" (1.651 m)   Wt 131.2 kg (289 lb 3.9 oz)   SpO2 95%   BMI 48.13 kg/m²        Physical Exam  Vitals and nursing note reviewed.   Constitutional:       General: She is not in acute distress.     Appearance: Normal appearance. She is obese. She is not ill-appearing.   HENT:      Head: Normocephalic and atraumatic.   Eyes:      General: No scleral icterus.     Conjunctiva/sclera: Conjunctivae normal.   Neck:      Thyroid: No thyromegaly.      Vascular: No carotid bruit or JVD.   Cardiovascular:      Rate and Rhythm: Normal rate and regular rhythm.      Pulses: Normal pulses.      Heart sounds: Murmur heard.      Systolic murmur is present with a grade of 1/6.      No friction rub. No gallop.   Pulmonary:      Effort: Pulmonary effort is normal.      Breath sounds: Normal breath sounds. No " "wheezing, rhonchi or rales.   Chest:      Chest wall: No tenderness.   Abdominal:      General: Bowel sounds are normal. There is no distension.      Palpations: Abdomen is soft.      Tenderness: There is no abdominal tenderness.   Musculoskeletal:         General: No swelling.      Cervical back: Neck supple.      Right lower leg: Pitting Edema (trace) present.      Left lower leg: Pitting Edema (trace) present.   Skin:     General: Skin is warm and dry.      Coloration: Skin is not pale.      Findings: No erythema or rash.      Nails: There is no clubbing.   Neurological:      Mental Status: She is alert and oriented to person, place, and time. Mental status is at baseline.   Psychiatric:         Mood and Affect: Mood and affect normal.         Behavior: Behavior normal.          Labs:     Lab Results   Component Value Date     11/20/2023    K 4.1 11/20/2023     11/20/2023    CO2 25 11/20/2023    BUN 30 (H) 11/20/2023    CREATININE 1.7 (H) 11/20/2023    ANIONGAP 6 (L) 11/20/2023     Lab Results   Component Value Date    HGBA1C 7.5 (H) 10/24/2023     No results found for: "BNP", "BNPTRIAGEBLO" Lab Results   Component Value Date    WBC 5.98 11/20/2023    HGB 10.5 (L) 11/20/2023    HCT 32.4 (L) 11/20/2023     11/20/2023    GRAN 3.5 11/20/2023    GRAN 58.0 11/20/2023     Lab Results   Component Value Date    CHOL 145 01/05/2023    HDL 36 (L) 01/05/2023    LDLCALC 78.2 01/05/2023    TRIG 154 (H) 01/05/2023          Imaging:   Echocardiograms:   TTE 5/27/2015    1 - Trivial aortic regurgitation.     2 - Normal left ventricular systolic function (EF 60-65%).     3 - Left ventricular diastolic dysfunction.     4 - Normal right ventricular systolic function .     Stress Tests:   Stress echo 3/14/2022  The left ventricle is normal in size with concentric remodeling and normal systolic function. The estimated ejection fraction is 60%.  Normal right ventricular size with normal right ventricular systolic " function.  Grade I left ventricular diastolic dysfunction.  The estimated PA systolic pressure is 39 mmHg.  Normal central venous pressure (3 mmHg).  The ECG portion of this study is negative for myocardial ischemia.  The stress echo portion of this study is negative for myocardial ischemia.  Overall, this study is negative for myocardial ischemia.    Caths:   None    Other:  Renal artery ultrasound 3/21/2022  Technically difficult study. The study was limited due to excessive bowel gas.  There is insignificant stenosis (0-59%) in the Right Renal Artery.  Elevated right renal resistive index suggestive of intrinsic kidney disease.  Right kidney 10.30 cm.  There is insignificant stenosis (0-59%) in the Left Renal Artery.  Elevated left renal resistive index suggestive of intrinsic kidney disease.  Left kidney 10.30 cm.    ABIs 5/4/2017  The right ankle brachial index was 1.25 which is normal.   The left ankle brachial index was 1.22 which is normal.   Waveform analysis are compatible with the above findings.     EKG 3/14/2022: normal sinus rhythm, no blocks or conduction defects, no ischemic changes    Assessment:     1. Pre-operative cardiovascular examination    2. Essential hypertension    3. Dyslipidemia associated with type 2 diabetes mellitus    4. Stage 3b chronic kidney disease    5. Type 2 diabetes mellitus with diabetic polyneuropathy, with long-term current use of insulin    6. Morbid obesity    7. RUFINA (obstructive sleep apnea)    8. Diastolic dysfunction    9. History of stroke    10. Ectatic aorta      Plan:     Pre-op lumbar RFA  Pt c/o exertional chest pain x 1.5 weeks and worsening, chronic MCFADDEN. She is unable to achieve > 4 METS due to SOB and back pain and has multiple risk factors for CVD. Will obtain stress echocardiogram prior to clearing for surgery.     Hypertension  Controlled at home  Continue Amlodipine 10 mg daily   Continue Chlorthalidone 25 mg daily   Continue Irbesartan 300 mg daily    Continue Labetalol 300 mg bid     Dyslipidemia  Ectatic aorta  LDL above goal. Due for recheck.   Continue Lipitor 40 mg daily for now    Diastolic dysfunction  No signs of ADHF  Recommend blood pressure control, weight loss, and low sodium diet    History of CVA  Stable  Continue ASA and statin    CKD Stage 3  Stable on recent labs  Baseline Cr ~1.7    Type 2 DM  Uncontrolled. On Jardiance   Following with Endocrine     Morbid obesity   Following a heart health diet such as the Mediterranean Diet is recommended in addition to 150 minutes a week of moderate intensity exercise to lower cholesterol, maintain a healthy body weight, and improve overall cardiovascular health.    RUFINA  Continue CPAP       Follow up in 6 months or sooner if stress test is abnormal.     Signed:  Ellen Angulo PA-C  Cardiology   [phone] - General

## 2023-11-28 NOTE — PROGRESS NOTES
"CC: This 66 y.o. female presents for management of Diabetes Mellitus     HPI: Diagnosed with T2DM in July 1987 when c/o polydipsia and polyuria. Started oral agents then NPH and Regular insulin. Converted to MDI with Lantus and Novolog in 2/2006 after knee surgery. D/c TZD r/t weight gain 7/08 and added Symlin. Stopped Symlin 2/09. Januvia added 2/10. D/C Januvia 12/10 r/t cost; resumed though pt assistance in 2016.  Converted to U500 in 7/10.   Received DM Education 1/22/08.   11/2013 she experienced severe hypoglycemia at home,  called 911, "nonresponsive" so brought to local ED for hypoglycemia BG 30s in 11/2013.     Medical conditions also include obesity, RUFINA, diastolic dysfunction, h/o stroke, PAD, and CKD 3.  Follows with cardiology, vascular medicine and nephrology.   Also has chronic back pain, which limits activity and contributes to wt gain. Follows with physical medicine.   Has chronic cough 2/2 GERD.  DM complicated with PDR with ME, PN    Reports recent steroid injuections for r knee and r wrist.     CURRENT DM MEDS: U-500 16-11-4 unit trina AC and Januvia 50mg QD  Denies missing any insulin injections or oral Rx.    She does not have BG logs but will mail them in. Oral recall:  BG 4/d day  Fasting   TUCKER 200s  DIN 300s  HS 100s    + Hypoglycemia, AM and overnight, ~ every 2 weeks. Can recognize and treat symtpoms.   Monitoring BG at home 3 x daily before meals.      She is eating 3 meals daily, Breakfast at 8am, lunch at 1pm, Dinner at 6pm, Bedtime 10-11pm.   Denies snacking; drinks water, unsweet tea.   Eats mostly at home.   No formal exercise     Standards of Care:  Eye exam: 10/16  Podiatry: 2/2017    ROS:   Gen: Appetite good, + weight gain, denies fatigue and weakness.  Skin: Skin is intact and heals well, no rashes, no hair changes  Eyes: Denies visual disturbances  Resp: no SOB or MCFADDEN, no cough today  Cardiac: No palpitations, chest pain, no edema   GI: No nausea or vomiting, " He is calling again to speak to [id number] He says he needs to get this straight today. diarrhea, constipation, or abdominal pain.  /GYN: 3-4 nocturia, burning or pain.   MS/Neuro: Denies numbness/ tingling in BLE; Gait steady, speech clear  R wrist carmenza w brace (follows with ortho). Chronic back pain.   Psych: Denies drug/ETOH abuse, no hx of depression.  Other systems: negative.    PE:  GENERAL: Well developed, well nourished.  PSYCH: AAOx3, appropriate mood and affect, pleasant expression, conversant, appears relaxed, well groomed.   EYES: Conjunctiva, corneas clear  NECK: Supple, trachea midline,no thyromegaly or nodules, carotid pulses strong, no bruits, no cervical or supraclavicular lymphadenopathy palpated.  CHEST: Resp even and unlabored, CTA bilateral.  CARDIAC: RRR, S1, S2 heard, no murmurs  ABDOMEN: Soft, non-tender, non-distended; +BSs x4  VASCULAR: DP pulses +2/4 bilaterally, no edema.  NEURO: Gait steady  SKINSkin warm and dry no acanthosis nigracans.  FEET:  Footware appropriate     Hemoglobin A1C   Date Value Ref Range Status   03/21/2017 9.4 (H) 4.5 - 6.2 % Final     Comment:     According to ADA guidelines, hemoglobin A1C <7.0% represents  optimal control in non-pregnant diabetic patients.  Different  metrics may apply to specific populations.   Standards of Medical Care in Diabetes - 2016.  For the purpose of screening for the presence of diabetes:  <5.7%     Consistent with the absence of diabetes  5.7-6.4%  Consistent with increasing risk for diabetes   (prediabetes)  >or=6.5%  Consistent with diabetes  Currently no consensus exists for use of hemoglobin A1C  for diagnosis of diabetes for children.     11/25/2016 9.1 (H) 4.5 - 6.2 % Final     Comment:     According to ADA guidelines, hemoglobin A1C <7.0% represents  optimal control in non-pregnant diabetic patients.  Different  metrics may apply to specific populations.   Standards of Medical Care in Diabetes - 2016.  For the purpose of screening for the presence of diabetes:  <5.7%     Consistent with the absence of  diabetes  5.7-6.4%  Consistent with increasing risk for diabetes   (prediabetes)  >or=6.5%  Consistent with diabetes  Currently no consensus exists for use of hemoglobin A1C  for diagnosis of diabetes for children.     08/25/2016 10.8 (H) 4.5 - 6.2 % Final     Comment:     According to ADA guidelines, hemoglobin A1C <7.0% represents  optimal control in non-pregnant diabetic patients.  Different  metrics may apply to specific populations.   Standards of Medical Care in Diabetes - 2016.  For the purpose of screening for the presence of diabetes:  <5.7%     Consistent with the absence of diabetes  5.7-6.4%  Consistent with increasing risk for diabetes   (prediabetes)  >or=6.5%  Consistent with diabetes  Currently no consensus exists for use of hemoglobin A1C  for diagnosis of diabetes for children.       ASSESSMENT and PLAN:    1. T2DM: uncontrolled, with neuropathy, retinopathy, nephropathy.   Change U-500 to 17- 13- 4 unit marks.  Continue Januvia 50mg QAM.     She is interested in U500 pen. Needs insurance inquiry.   DM ed to review pen use today. She has used pens in the past. Discussed that U500 pen does not require dose conversion.    Notify me for episodes of hypoglycemia   Discussed A1c goals.     - takes ASA, ACEi, statin     2. PDM retinopathy with macular edema - optimize BG. Continue follow with Ophth.      3. CKD stage III - GFR 35.7, on ACEi therapy; avoiding metformin and SLGT2i. Saw nephrology 6/2016     4. Peripheral neuropathy - on Lyrica and Cymbalta and reports some relief. Off gabapentin.      5. HLP -LDL could be better. Repeat CMP, lipids. Dicussed conversion to atorvastatin RTC and then titration to max dose.      6. Morbid obesity  Body mass index is 61.13 kg/(m^2).  Increases insulin resistance. Chronic pain limits physical activity.     7. HTN - Controlled, continue meds as previously prescribed and monitor.      8. Knee/ wrist pain- impacts BG management. Pain may elevate BG readings. Limits  mobility and contributing to wt. She also receives occasional steroid injections.

## 2023-11-29 ENCOUNTER — LAB VISIT (OUTPATIENT)
Dept: LAB | Facility: HOSPITAL | Age: 73
End: 2023-11-29
Attending: PHYSICIAN ASSISTANT
Payer: COMMERCIAL

## 2023-11-29 DIAGNOSIS — E11.69 DYSLIPIDEMIA ASSOCIATED WITH TYPE 2 DIABETES MELLITUS: ICD-10-CM

## 2023-11-29 DIAGNOSIS — E78.5 DYSLIPIDEMIA ASSOCIATED WITH TYPE 2 DIABETES MELLITUS: ICD-10-CM

## 2023-11-29 LAB
CHOLEST SERPL-MCNC: 218 MG/DL (ref 120–199)
CHOLEST/HDLC SERPL: 5.6 {RATIO} (ref 2–5)
HDLC SERPL-MCNC: 39 MG/DL (ref 40–75)
HDLC SERPL: 17.9 % (ref 20–50)
LDLC SERPL CALC-MCNC: 143 MG/DL (ref 63–159)
NONHDLC SERPL-MCNC: 179 MG/DL
TRIGL SERPL-MCNC: 180 MG/DL (ref 30–150)

## 2023-11-29 PROCEDURE — 80061 LIPID PANEL: CPT | Performed by: PHYSICIAN ASSISTANT

## 2023-11-29 PROCEDURE — 36415 COLL VENOUS BLD VENIPUNCTURE: CPT | Mod: PO | Performed by: PHYSICIAN ASSISTANT

## 2023-11-30 ENCOUNTER — TELEPHONE (OUTPATIENT)
Dept: CARDIOLOGY | Facility: CLINIC | Age: 73
End: 2023-11-30
Payer: COMMERCIAL

## 2023-11-30 RX ORDER — ATORVASTATIN CALCIUM 80 MG/1
80 TABLET, FILM COATED ORAL NIGHTLY
Qty: 30 TABLET | Refills: 11 | Status: SHIPPED | OUTPATIENT
Start: 2023-11-30 | End: 2024-11-29

## 2023-11-30 NOTE — TELEPHONE ENCOUNTER
Called patient to discuss lipid panel results showing uncontrolled LDL. Will increase Lipitor to 80 mg daily. New Rx sent to pharmacy.

## 2023-12-06 ENCOUNTER — HOSPITAL ENCOUNTER (OUTPATIENT)
Dept: CARDIOLOGY | Facility: HOSPITAL | Age: 73
Discharge: HOME | End: 2023-12-06
Attending: PHYSICIAN ASSISTANT
Payer: MEDICARE

## 2023-12-06 VITALS
HEIGHT: 65 IN | RESPIRATION RATE: 16 BRPM | WEIGHT: 289 LBS | BODY MASS INDEX: 48.15 KG/M2 | DIASTOLIC BLOOD PRESSURE: 50 MMHG | SYSTOLIC BLOOD PRESSURE: 120 MMHG

## 2023-12-06 DIAGNOSIS — R07.9 CHEST PAIN, UNSPECIFIED TYPE: ICD-10-CM

## 2023-12-06 LAB
ASCENDING AORTA: 2.93 CM
BSA FOR ECHO PROCEDURE: 2.45 M2
CV ECHO LV RWT: 0.49 CM
CV STRESS BASE HR: 62 BPM
DIASTOLIC BLOOD PRESSURE: 69 MMHG
DOP CALC LVOT AREA: 3.7 CM2
DOP CALC LVOT DIAMETER: 2.18 CM
DOP CALC LVOT PEAK VEL: 1.07 M/S
DOP CALC LVOT STROKE VOLUME: 99.46 CM3
DOP CALCLVOT PEAK VEL VTI: 26.66 CM
E WAVE DECELERATION TIME: 333.96 MSEC
E/A RATIO: 1.08
E/E' RATIO: 24.17 M/S
ECHO LV POSTERIOR WALL: 1.15 CM (ref 0.6–1.1)
FRACTIONAL SHORTENING: 39 % (ref 28–44)
INTERVENTRICULAR SEPTUM: 1.07 CM (ref 0.6–1.1)
IVRT: 59.94 MSEC
LA MAJOR: 6.43 CM
LA MINOR: 6.09 CM
LA WIDTH: 3.94 CM
LEFT ATRIUM SIZE: 4.04 CM
LEFT ATRIUM VOLUME INDEX: 36.6 ML/M2
LEFT ATRIUM VOLUME: 84.64 CM3
LEFT INTERNAL DIMENSION IN SYSTOLE: 2.86 CM (ref 2.1–4)
LEFT VENTRICLE DIASTOLIC VOLUME INDEX: 43.84 ML/M2
LEFT VENTRICLE DIASTOLIC VOLUME: 101.27 ML
LEFT VENTRICLE MASS INDEX: 82 G/M2
LEFT VENTRICLE SYSTOLIC VOLUME INDEX: 13.4 ML/M2
LEFT VENTRICLE SYSTOLIC VOLUME: 31.06 ML
LEFT VENTRICULAR INTERNAL DIMENSION IN DIASTOLE: 4.68 CM (ref 3.5–6)
LEFT VENTRICULAR MASS: 188.64 G
LV LATERAL E/E' RATIO: 24.17 M/S
LV SEPTAL E/E' RATIO: 24.17 M/S
MV A" WAVE DURATION": 8.85 MSEC
MV PEAK A VEL: 1.34 M/S
MV PEAK E VEL: 1.45 M/S
MV STENOSIS PRESSURE HALF TIME: 96.85 MS
MV VALVE AREA P 1/2 METHOD: 2.27 CM2
OHS CV CPX 1 MINUTE RECOVERY HEART RATE: 120 BPM
OHS CV CPX 85 PERCENT MAX PREDICTED HEART RATE MALE: 125
OHS CV CPX MAX PREDICTED HEART RATE: 147
OHS CV CPX PATIENT IS FEMALE: 1
OHS CV CPX PATIENT IS MALE: 0
OHS CV CPX PEAK DIASTOLIC BLOOD PRESSURE: 79 MMHG
OHS CV CPX PEAK HEAR RATE: 122 BPM
OHS CV CPX PEAK RATE PRESSURE PRODUCT: ABNORMAL
OHS CV CPX PEAK SYSTOLIC BLOOD PRESSURE: 169 MMHG
OHS CV CPX PERCENT MAX PREDICTED HEART RATE ACHIEVED: 86
OHS CV CPX RATE PRESSURE PRODUCT PRESENTING: 8432
OHS CV INITIAL DOSE: 10 MCG/KG/MIN
OHS CV PEAK DOSE: 60 MCG/KG/MIN
PISA TR MAX VEL: 2.85 M/S
PULM VEIN S/D RATIO: 1.27
PV PEAK D VEL: 0.63 M/S
PV PEAK S VEL: 0.8 M/S
RA MAJOR: 5.6 CM
RA PRESSURE ESTIMATED: 3 MMHG
RA WIDTH: 3.57 CM
RIGHT VENTRICULAR END-DIASTOLIC DIMENSION: 3.54 CM
RV TB RVSP: 6 MMHG
SINUS: 3.41 CM
STJ: 2.44 CM
SYSTOLIC BLOOD PRESSURE: 136 MMHG
TDI LATERAL: 0.06 M/S
TDI SEPTAL: 0.06 M/S
TDI: 0.06 M/S
TR MAX PG: 32 MMHG
TRICUSPID ANNULAR PLANE SYSTOLIC EXCURSION: 2.69 CM
TV REST PULMONARY ARTERY PRESSURE: 35 MMHG
Z-SCORE OF LEFT VENTRICULAR DIMENSION IN END DIASTOLE: -6.63
Z-SCORE OF LEFT VENTRICULAR DIMENSION IN END SYSTOLE: -5.14

## 2023-12-06 PROCEDURE — 93351 STRESS TTE COMPLETE: CPT | Mod: 26,,, | Performed by: INTERNAL MEDICINE

## 2023-12-06 PROCEDURE — 93351 STRESS TTE COMPLETE: CPT

## 2023-12-06 PROCEDURE — 63600175 PHARM REV CODE 636 W HCPCS: Performed by: PHYSICIAN ASSISTANT

## 2023-12-06 PROCEDURE — 93351 STRESS ECHO (CUPID ONLY): ICD-10-PCS | Mod: 26,,, | Performed by: INTERNAL MEDICINE

## 2023-12-06 RX ORDER — DOBUTAMINE HYDROCHLORIDE 400 MG/100ML
10 INJECTION, SOLUTION INTRAVENOUS ONCE
Status: COMPLETED | OUTPATIENT
Start: 2023-12-06 | End: 2023-12-06

## 2023-12-06 RX ORDER — ATROPINE SULFATE 0.1 MG/ML
2 INJECTION INTRAVENOUS ONCE
Status: COMPLETED | OUTPATIENT
Start: 2023-12-06 | End: 2023-12-06

## 2023-12-06 RX ADMIN — ATROPINE SULFATE 2 MG: 0.1 INJECTION INTRAVENOUS at 03:12

## 2023-12-06 RX ADMIN — DOBUTAMINE 10 MCG/KG/MIN: 12.5 INJECTION, SOLUTION, CONCENTRATE INTRAVENOUS at 03:12

## 2023-12-07 ENCOUNTER — OFFICE VISIT (OUTPATIENT)
Dept: SLEEP MEDICINE | Facility: CLINIC | Age: 73
End: 2023-12-07
Payer: MEDICARE

## 2023-12-07 ENCOUNTER — DOCUMENTATION ONLY (OUTPATIENT)
Dept: CARDIOLOGY | Facility: CLINIC | Age: 73
End: 2023-12-07
Payer: COMMERCIAL

## 2023-12-07 VITALS
DIASTOLIC BLOOD PRESSURE: 64 MMHG | BODY MASS INDEX: 47.82 KG/M2 | WEIGHT: 287 LBS | HEIGHT: 65 IN | SYSTOLIC BLOOD PRESSURE: 122 MMHG | HEART RATE: 70 BPM

## 2023-12-07 DIAGNOSIS — E11.69 DYSLIPIDEMIA ASSOCIATED WITH TYPE 2 DIABETES MELLITUS: ICD-10-CM

## 2023-12-07 DIAGNOSIS — E11.22 TYPE 2 DIABETES MELLITUS WITH STAGE 3B CHRONIC KIDNEY DISEASE AND HYPERTENSION: ICD-10-CM

## 2023-12-07 DIAGNOSIS — N18.32 STAGE 3B CHRONIC KIDNEY DISEASE: ICD-10-CM

## 2023-12-07 DIAGNOSIS — I10 ESSENTIAL HYPERTENSION: ICD-10-CM

## 2023-12-07 DIAGNOSIS — N18.32 TYPE 2 DIABETES MELLITUS WITH STAGE 3B CHRONIC KIDNEY DISEASE AND HYPERTENSION: ICD-10-CM

## 2023-12-07 DIAGNOSIS — J45.20 MILD INTERMITTENT ASTHMA WITHOUT COMPLICATION: Primary | ICD-10-CM

## 2023-12-07 DIAGNOSIS — Z79.4 TYPE 2 DIABETES MELLITUS WITH DIABETIC POLYNEUROPATHY, WITH LONG-TERM CURRENT USE OF INSULIN: ICD-10-CM

## 2023-12-07 DIAGNOSIS — G47.33 OSA (OBSTRUCTIVE SLEEP APNEA): ICD-10-CM

## 2023-12-07 DIAGNOSIS — E66.01 MORBID OBESITY: ICD-10-CM

## 2023-12-07 DIAGNOSIS — E78.5 DYSLIPIDEMIA ASSOCIATED WITH TYPE 2 DIABETES MELLITUS: ICD-10-CM

## 2023-12-07 DIAGNOSIS — E11.42 TYPE 2 DIABETES MELLITUS WITH DIABETIC POLYNEUROPATHY, WITH LONG-TERM CURRENT USE OF INSULIN: ICD-10-CM

## 2023-12-07 DIAGNOSIS — I12.9 TYPE 2 DIABETES MELLITUS WITH STAGE 3B CHRONIC KIDNEY DISEASE AND HYPERTENSION: ICD-10-CM

## 2023-12-07 PROCEDURE — 99213 OFFICE O/P EST LOW 20 MIN: CPT | Mod: 95,,, | Performed by: INTERNAL MEDICINE

## 2023-12-07 PROCEDURE — 3051F PR MOST RECENT HEMOGLOBIN A1C LEVEL 7.0 - < 8.0%: ICD-10-PCS | Mod: CPTII,95,, | Performed by: INTERNAL MEDICINE

## 2023-12-07 PROCEDURE — 3078F PR MOST RECENT DIASTOLIC BLOOD PRESSURE < 80 MM HG: ICD-10-PCS | Mod: CPTII,95,, | Performed by: INTERNAL MEDICINE

## 2023-12-07 PROCEDURE — 1101F PR PT FALLS ASSESS DOC 0-1 FALLS W/OUT INJ PAST YR: ICD-10-PCS | Mod: CPTII,95,, | Performed by: INTERNAL MEDICINE

## 2023-12-07 PROCEDURE — 3008F BODY MASS INDEX DOCD: CPT | Mod: CPTII,95,, | Performed by: INTERNAL MEDICINE

## 2023-12-07 PROCEDURE — 4010F PR ACE/ARB THEARPY RXD/TAKEN: ICD-10-PCS | Mod: CPTII,95,, | Performed by: INTERNAL MEDICINE

## 2023-12-07 PROCEDURE — 3008F PR BODY MASS INDEX (BMI) DOCUMENTED: ICD-10-PCS | Mod: CPTII,95,, | Performed by: INTERNAL MEDICINE

## 2023-12-07 PROCEDURE — 1159F PR MEDICATION LIST DOCUMENTED IN MEDICAL RECORD: ICD-10-PCS | Mod: CPTII,95,, | Performed by: INTERNAL MEDICINE

## 2023-12-07 PROCEDURE — 3078F DIAST BP <80 MM HG: CPT | Mod: CPTII,95,, | Performed by: INTERNAL MEDICINE

## 2023-12-07 PROCEDURE — 3051F HG A1C>EQUAL 7.0%<8.0%: CPT | Mod: CPTII,95,, | Performed by: INTERNAL MEDICINE

## 2023-12-07 PROCEDURE — 3066F PR DOCUMENTATION OF TREATMENT FOR NEPHROPATHY: ICD-10-PCS | Mod: CPTII,95,, | Performed by: INTERNAL MEDICINE

## 2023-12-07 PROCEDURE — 1159F MED LIST DOCD IN RCRD: CPT | Mod: CPTII,95,, | Performed by: INTERNAL MEDICINE

## 2023-12-07 PROCEDURE — 3060F POS MICROALBUMINURIA REV: CPT | Mod: CPTII,95,, | Performed by: INTERNAL MEDICINE

## 2023-12-07 PROCEDURE — 3288F FALL RISK ASSESSMENT DOCD: CPT | Mod: CPTII,95,, | Performed by: INTERNAL MEDICINE

## 2023-12-07 PROCEDURE — 3060F PR POS MICROALBUMINURIA RESULT DOCUMENTED/REVIEW: ICD-10-PCS | Mod: CPTII,95,, | Performed by: INTERNAL MEDICINE

## 2023-12-07 PROCEDURE — 3074F PR MOST RECENT SYSTOLIC BLOOD PRESSURE < 130 MM HG: ICD-10-PCS | Mod: CPTII,95,, | Performed by: INTERNAL MEDICINE

## 2023-12-07 PROCEDURE — 3288F PR FALLS RISK ASSESSMENT DOCUMENTED: ICD-10-PCS | Mod: CPTII,95,, | Performed by: INTERNAL MEDICINE

## 2023-12-07 PROCEDURE — 1101F PT FALLS ASSESS-DOCD LE1/YR: CPT | Mod: CPTII,95,, | Performed by: INTERNAL MEDICINE

## 2023-12-07 PROCEDURE — 4010F ACE/ARB THERAPY RXD/TAKEN: CPT | Mod: CPTII,95,, | Performed by: INTERNAL MEDICINE

## 2023-12-07 PROCEDURE — 3074F SYST BP LT 130 MM HG: CPT | Mod: CPTII,95,, | Performed by: INTERNAL MEDICINE

## 2023-12-07 PROCEDURE — 99213 PR OFFICE/OUTPT VISIT, EST, LEVL III, 20-29 MIN: ICD-10-PCS | Mod: 95,,, | Performed by: INTERNAL MEDICINE

## 2023-12-07 PROCEDURE — 3066F NEPHROPATHY DOC TX: CPT | Mod: CPTII,95,, | Performed by: INTERNAL MEDICINE

## 2023-12-07 RX ORDER — ALLOPURINOL 300 MG/1
TABLET ORAL
COMMUNITY
Start: 2023-11-28

## 2023-12-07 RX ORDER — EMPAGLIFLOZIN 10 MG/1
10 TABLET, FILM COATED ORAL
Qty: 90 TABLET | Refills: 3 | Status: SHIPPED | OUTPATIENT
Start: 2023-12-07

## 2023-12-07 NOTE — ASSESSMENT & PLAN NOTE
12/7/2023     1:52 PM   EPWORTH SLEEPINESS SCALE   Sitting and reading 1   Watching TV 1   Sitting, inactive in a public place (e.g. a theatre or a meeting) 0   As a passenger in a car for an hour without a break 0   Lying down to rest in the afternoon when circumstances permit 3   Sitting and talking to someone 0   Sitting quietly after a lunch without alcohol 0   In a car, while stopped for a few minutes in traffic 1   Total score 6      [employer] 11  Data 11/06/2023 to 12/05/2023  Usage > 4 hrs was 97%  APAp 5-10  AHI 0.8    USING AND BENEFITS

## 2023-12-07 NOTE — PROGRESS NOTES
The patient location is: [facility]   The chief complaint leading to consultation is:     Chief Complaint   Patient presents with    Apnea        Visit type: audiovisual    Face to Face time with patient: 4 mins  30 minutes of total time spent on the encounter, which includes face to face time and non-face to face time preparing to see the patient (eg, review of tests), Obtaining and/or reviewing separately obtained history, Documenting clinical information in the electronic or other health record, Independently interpreting results (not separately reported) and communicating results to the patient/family/caregiver, or Care coordination (not separately reported).         Each patient to whom he or she provides medical services by telemedicine is:  (1) informed of the relationship between the physician and patient and the respective role of any other health care provider with respect to management of the patient; and (2) notified that he or she may decline to receive medical services by telemedicine and may withdraw from such care at any time.    Notes:                                           Pulmonary Outpatient  Visit     Subjective:       Patient ID: Annika Mac is a 72 y.o. female.    Social History     Tobacco Use   Smoking Status Never   Smokeless Tobacco Never            Chief Complaint: Apnea      Annika Mac is 72 y.o.  Referred by Avril Bianchi, [facility]  [address] [place] [address]   Seen Ms Ferraro last year  Per PCP note April 2023: not using consistently  CPAP take away  PSG reviewed: Moderate to severe RUFINA,   CPAP returned last year  Snoring, apnea, DTS  Comorbidity: BMI, DM, Hx of stroke, HTN  Also carried Dx Asthma: PRN inhaler  No prior spirometry  CXR was reviewed  Never smoker  FeNo was 7       Split night study 11/2013 showed AHI 23.7 that was reduced to 3.3 at 8 cm H20.   She was on CPAP for about a year.   She felt like her air pressure was not set correctly.   PSG  "2/2021 AHI 12.3  PSG 9/2021 AHI 6.3 supine    07/27/2023  Here with daughter:  PSG: AHI 4.4/hr  Suspicion high will repeat HSAT  Spiroemtry was normal    CONCLUSION:  1. Overall AHI was 4.4/hr with 31 events. REM AHI was 9.3/hr  2. SpO2 alfa was 88.0%  3. Sleep efficiency was 92.3%  4. REM latency was 65 minutes  5. Moderate Snoring     DIAGNOSIS: Upper airway resistance syndrome,      RECOMMENDATIONS:     1. Weight loss  2. ENT evaluation for Snoring  3. Positional therapy for Snoring or dental mandibular device fashioned by dental provider    09/21/2023   Follow-up appointment   Reviewed sleep study results   Positive for mild obstructive sleep apnea   Goals for CPAP discussed   CPAP will be ordered      12//07/2023  Doing well with CPAP  Recent echo and stress              Review of Systems   Respiratory:  Negative for apnea, snoring and somnolence.    All other systems reviewed and are negative.      Outpatient Encounter Medications as of 12/7/2023   Medication Sig Dispense Refill    albuterol (PROAIR HFA) 90 mcg/actuation inhaler Inhale 2 puffs into the lungs every 6 (six) hours as needed for Wheezing. Rescue 18 g 6    allopurinoL (ZYLOPRIM) 300 MG tablet       amLODIPine (NORVASC) 10 MG tablet TAKE 1 TABLET BY MOUTH ONCE  DAILY 90 tablet 3    aspirin 81 MG Chew Take 1 tablet (81 mg total) by mouth once daily.  0    atorvastatin (LIPITOR) 80 MG tablet Take 1 tablet (80 mg total) by mouth every evening. 30 tablet 11    BD ULTRA-FINE KIKA PEN NEEDLE 32 gauge x 5/32" Ndle USE 4 TIMES DAILY 200 each 20    blood sugar diagnostic Strp 1 strip 4 times daily. Contour Next. 200 strip 11    blood-glucose meter kit 1 each by Other route once daily. Use daily. Insurance proffered one touch  E11.42 1 each 0    blood-glucose sensor (DEXCOM G7 SENSOR MISC) by Misc.(Non-Drug; Combo Route) route.      blood-glucose transmitter (DEXCOM G6 TRANSMITTER) Terese 1 Device by Misc.(Non-Drug; Combo Route) route every 3 (three) months. 1 " each 3    chlorthalidone (HYGROTEN) 25 MG Tab Take 1 tablet (25 mg total) by mouth once daily. 30 tablet 1    CONSTULOSE 10 gram/15 mL solution SMARTSI Milliliter(s) By Mouth Twice Daily PRN      DEXCOM G7  Misc Use as directed 1 each 0    DEXCOM G7 SENSOR Terese 1 Device by Misc.(Non-Drug; Combo Route) route every 10 days. 3 each 11    dulaglutide (TRULICITY) 4.5 mg/0.5 mL pen injector Inject 4.5 mg into the skin every 7 days. 12 pen 3    DULoxetine (CYMBALTA) 30 MG capsule Take 2 capsules (60 mg total) by mouth once daily. 180 capsule 3    finasteride (PROSCAR) 5 mg tablet Take 1 tablet (5 mg total) by mouth once daily. 30 tablet 11    glucagon (GVOKE HYPOPEN 1-PACK) 0.5 mg/0.1 mL AtIn Inject 1 Dose into the skin daily as needed (for severe hypoglycemia if you aren't able to treat by ingesting sugar). 1 Syringe 3    insulin lispro (HUMALOG KWIKPEN INSULIN) 100 unit/mL pen INJECT 13 UNITS INTO THE  SKIN 3 TIMES DAILY BEFORE  MEALS PLUS SLIDING SCALE -  MAX TOTAL DAILY DOSE 70  UNITS 30 mL 6    irbesartan (AVAPRO) 300 MG tablet TAKE 1 TABLET BY MOUTH IN THE  EVENING 90 tablet 3    JARDIANCE 10 mg tablet TAKE 1 TABLET BY MOUTH ONCE  DAILY 90 tablet 3    ketoconazole (NIZORAL) 2 % shampoo Wash hair with medicated shampoo at least 2x/week - let sit on scalp at least 5 minutes prior to rinsing 120 mL 5    labetaloL (NORMODYNE) 300 MG tablet Take 1 tablet (300 mg total) by mouth 2 (two) times daily. 180 tablet 3    lancets 31 gauge Misc 1 lancet by Misc.(Non-Drug; Combo Route) route 4 (four) times daily. E11.42 . Prescribed one touch 200 each 6    multivitamin (THERAGRAN) per tablet Take 1 tablet by mouth once daily.      pregabalin (LYRICA) 150 MG capsule TAKE 1 CAPSULE BY MOUTH TWICE  DAILY 60 capsule 5    TRESIBA FLEXTOUCH U-100 100 unit/mL (3 mL) insulin pen INJECT 40 UNITS INTO THE SKIN  ONCE DAILY 15 mL 8    famotidine (PEPCID) 20 MG tablet Take 1 tablet (20 mg total) by mouth 2 (two) times daily. 1  tablet 0    sodium bicarbonate 325 MG tablet Take 2 tablets (650 mg total) by mouth 2 (two) times daily. 120 tablet 11    [DISCONTINUED] empagliflozin (JARDIANCE) 10 mg tablet Take 1 tablet (10 mg total) by mouth once daily. 90 tablet 3    [DISCONTINUED] insulin glargine, TOUJEO, (TOUJEO SOLOSTAR) 300 unit/mL (1.5 mL) InPn pen INJECT 40 UNITS INTO THE SKIN EVERY EVENING TITRATE UP AS DIRECTED. 4.5 mL 0     Facility-Administered Encounter Medications as of 12/7/2023   Medication Dose Route Frequency Provider Last Rate Last Admin    [COMPLETED] atropine injection 2 mg  2 mg Intravenous Once Ellen Angulo PA-C   2 mg at 12/06/23 1507    [COMPLETED] DOBUTamine 50 mg in D5W 50 mL (1 mg/mL) cardiac stress test infusion  10 mcg/kg/min Intravenous Once Ellen Angulo PA-C 78.7 mL/hr at 12/06/23 1501 10 mcg/kg/min at 12/06/23 1501    triamcinolone acetonide injection 10 mg  10 mg Intradermal Once Susie Curran MD        triamcinolone acetonide injection 10 mg  10 mg Intradermal Once Susie Curran MD           The following portions of the patient's history were reviewed and updated as appropriate: She  has a past medical history of Asthma, Chronic obstructive pulmonary disease, unspecified COPD type (1/14/2022), Constipation (10/3/2019), Deep vein thrombophlebitis of left leg (7/27/2012), Deep vein thrombosis, Diabetic foot ulcer with osteomyelitis, Encounter for blood transfusion, GERD (gastroesophageal reflux disease), Obesity, diabetes, and hypertension syndrome (2/13/2019), Obstructive sleep apnea (7/27/2012), PAD (peripheral artery disease) (11/21/2013), Pressure ulcer of toe of left foot, Type 2 diabetes mellitus with obesity (8/19/2020), and Type 2 diabetes mellitus with peripheral artery disease (8/19/2020).  She does not have any pertinent problems on file.  She  has a past surgical history that includes Total knee arthroplasty (2/2006); Total abdominal hysterectomy (1982); Ganglion  cyst excision (2007); Cyst Removal (2011); Trigger finger release (2009);  section; Cataract extraction w/  intraocular lens implant (Left, 3/2002); Cataract extraction w/  intraocular lens implant (Right, ); Pan Retinal Photocoagulation (Bilateral); Eye surgery (Bilateral, ); Colonoscopy (N/A, 2018); Cystoscopy w/ retrogrades (Left, 2020); Joint replacement (Left); Interposition arthroplasty of carpometacarpal joints (Left, 2021); De Quervain's release (2021); Injection of facet joint (Bilateral, 2021); Injection of anesthetic agent around medial branch nerves innervating lumbar facet joint (Bilateral, 2022); Injection of anesthetic agent around medial branch nerves innervating lumbar facet joint (Bilateral, 2022); Radiofrequency thermocoagulation (Right, 2022); Radiofrequency thermocoagulation (Left, 2022); Esophagogastroduodenoscopy (N/A, 2023); and Colonoscopy (N/A, 2023).  Her family history includes Diabetes in her brother, mother, and sister; Heart disease in her father; Hypertension in her brother and mother; No Known Problems in her brother, daughter, daughter, sister, and son; Thyroid disease in her brother.  She  reports that she has never smoked. She has never used smokeless tobacco. She reports that she does not drink alcohol and does not use drugs.  She has a current medication list which includes the following prescription(s): albuterol, allopurinol, amlodipine, aspirin, atorvastatin, bd ultra-fine myesha pen needle, blood sugar diagnostic, blood-glucose meter, blood-glucose sensor, dexcom g6 transmitter, chlorthalidone, constulose, dexcom g7 , dexcom g7 sensor, trulicity, duloxetine, finasteride, gvoke hypopen 1-pack, insulin lispro, irbesartan, jardiance, ketoconazole, labetalol, lancets, multivitamin, pregabalin, tresiba flextouch u-100, famotidine, sodium bicarbonate, and [DISCONTINUED] insulin glargine  "(toujeo), and the following Facility-Administered Medications: triamcinolone acetonide and triamcinolone acetonide.  Current Outpatient Medications on File Prior to Visit   Medication Sig Dispense Refill    albuterol (PROAIR HFA) 90 mcg/actuation inhaler Inhale 2 puffs into the lungs every 6 (six) hours as needed for Wheezing. Rescue 18 g 6    allopurinoL (ZYLOPRIM) 300 MG tablet       amLODIPine (NORVASC) 10 MG tablet TAKE 1 TABLET BY MOUTH ONCE  DAILY 90 tablet 3    aspirin 81 MG Chew Take 1 tablet (81 mg total) by mouth once daily.  0    atorvastatin (LIPITOR) 80 MG tablet Take 1 tablet (80 mg total) by mouth every evening. 30 tablet 11    BD ULTRA-FINE KIKA PEN NEEDLE 32 gauge x 5/32" Ndle USE 4 TIMES DAILY 200 each 20    blood sugar diagnostic Strp 1 strip 4 times daily. Contour Next. 200 strip 11    blood-glucose meter kit 1 each by Other route once daily. Use daily. Insurance proffered one touch  E11.42 1 each 0    blood-glucose sensor (DEXCOM G7 SENSOR MISC) by Misc.(Non-Drug; Combo Route) route.      blood-glucose transmitter (DEXCOM G6 TRANSMITTER) Terese 1 Device by Misc.(Non-Drug; Combo Route) route every 3 (three) months. 1 each 3    chlorthalidone (HYGROTEN) 25 MG Tab Take 1 tablet (25 mg total) by mouth once daily. 30 tablet 1    CONSTULOSE 10 gram/15 mL solution SMARTSI Milliliter(s) By Mouth Twice Daily PRN      DEXCOM G7  Misc Use as directed 1 each 0    DEXCOM G7 SENSOR Terese 1 Device by Misc.(Non-Drug; Combo Route) route every 10 days. 3 each 11    dulaglutide (TRULICITY) 4.5 mg/0.5 mL pen injector Inject 4.5 mg into the skin every 7 days. 12 pen 3    DULoxetine (CYMBALTA) 30 MG capsule Take 2 capsules (60 mg total) by mouth once daily. 180 capsule 3    finasteride (PROSCAR) 5 mg tablet Take 1 tablet (5 mg total) by mouth once daily. 30 tablet 11    glucagon (GVOKE HYPOPEN 1-PACK) 0.5 mg/0.1 mL AtIn Inject 1 Dose into the skin daily as needed (for severe hypoglycemia if you aren't able " to treat by ingesting sugar). 1 Syringe 3    insulin lispro (HUMALOG KWIKPEN INSULIN) 100 unit/mL pen INJECT 13 UNITS INTO THE  SKIN 3 TIMES DAILY BEFORE  MEALS PLUS SLIDING SCALE -  MAX TOTAL DAILY DOSE 70  UNITS 30 mL 6    irbesartan (AVAPRO) 300 MG tablet TAKE 1 TABLET BY MOUTH IN THE  EVENING 90 tablet 3    ketoconazole (NIZORAL) 2 % shampoo Wash hair with medicated shampoo at least 2x/week - let sit on scalp at least 5 minutes prior to rinsing 120 mL 5    labetaloL (NORMODYNE) 300 MG tablet Take 1 tablet (300 mg total) by mouth 2 (two) times daily. 180 tablet 3    lancets 31 gauge Misc 1 lancet by Misc.(Non-Drug; Combo Route) route 4 (four) times daily. E11.42 . Prescribed one touch 200 each 6    multivitamin (THERAGRAN) per tablet Take 1 tablet by mouth once daily.      pregabalin (LYRICA) 150 MG capsule TAKE 1 CAPSULE BY MOUTH TWICE  DAILY 60 capsule 5    TRESIBA FLEXTOUCH U-100 100 unit/mL (3 mL) insulin pen INJECT 40 UNITS INTO THE SKIN  ONCE DAILY 15 mL 8    famotidine (PEPCID) 20 MG tablet Take 1 tablet (20 mg total) by mouth 2 (two) times daily. 1 tablet 0    sodium bicarbonate 325 MG tablet Take 2 tablets (650 mg total) by mouth 2 (two) times daily. 120 tablet 11    [DISCONTINUED] empagliflozin (JARDIANCE) 10 mg tablet Take 1 tablet (10 mg total) by mouth once daily. 90 tablet 3    [DISCONTINUED] insulin glargine, TOUJEO, (TOUJEO SOLOSTAR) 300 unit/mL (1.5 mL) InPn pen INJECT 40 UNITS INTO THE SKIN EVERY EVENING TITRATE UP AS DIRECTED. 4.5 mL 0     Current Facility-Administered Medications on File Prior to Visit   Medication Dose Route Frequency Provider Last Rate Last Admin    [COMPLETED] atropine injection 2 mg  2 mg Intravenous Once Ellen Angulo PA-C   2 mg at 12/06/23 1507    [COMPLETED] DOBUTamine 50 mg in D5W 50 mL (1 mg/mL) cardiac stress test infusion  10 mcg/kg/min Intravenous Once Ellen Angulo PA-C 78.7 mL/hr at 12/06/23 1501 10 mcg/kg/min at 12/06/23 1501    triamcinolone  "acetonide injection 10 mg  10 mg Intradermal Once Susie Curran MD        triamcinolone acetonide injection 10 mg  10 mg Intradermal Once Susie Curran MD         She is allergic to clindamycin and iodinated contrast media..      BP Readings from Last 3 Encounters:   12/07/23 122/64   12/06/23 (!) 120/50   11/27/23 137/63     Snoring / Sleep:     [place] Questionnaire (validated RUFINA screening questionnaire)    YES -- Snoring/apnea    YES -- Fatigue    Body mass index is 47.76 kg/m².  (>25 is overweight, >30 is obese)    Blood Pressure = Hypertension  (PreHTN 120-139/80-89, Stg1 140-159/90-99, Stg2 >160/>100)  [place] = 3 of three RUFINA categories are positive (high risk is 2-3 positive categories)     Epwo       12/7/2023     1:52 PM   EPWORTH SLEEPINESS SCALE   Sitting and reading 1   Watching TV 1   Sitting, inactive in a public place (e.g. a theatre or a meeting) 0   As a passenger in a car for an hour without a break 0   Lying down to rest in the afternoon when circumstances permit 3   Sitting and talking to someone 0   Sitting quietly after a lunch without alcohol 0   In a car, while stopped for a few minutes in traffic 1   Total score 6        STOP-Bang Questionnaire (validated RUFINA screening questionnaire)  Negative unless checked off.  [x] Snoring    [x]  Tired/Fatigued/Sleepy  [x] Obstruction (apneas/choking)  [x] Pressure (HTN)  [] BMI >35  [x] Age >50  [] Neck >40 cm  [] Gender male   STOP-Bang = 5 (low risk 0-2,high risk 3-8)    Neck circumference 16"        MMRC Dyspnea Scale (4 is worst)     [] MMRC 0: Dyspneic on strenuous excercise (0 points)    [x] MMRC 1: Dyspneic on walking a slight hill (0 points)    [] MMRC 2: Dyspneic on walking level ground; must stop occasionally due to breathlessness (1 point)    [] MMRC 3: Must stop for breathlessness after walking 100 yards or after a few minutes (2 points)    [] MMRC 4: Cannot leave house; breathless on dressing/undressing (3 points)          " "           No flowsheet data found.              Objective:     Vital Signs (Most Recent)  Vital Signs  Pulse: 70  BP: 122/64  Height and Weight  Height: 5' 5" (165.1 cm)  Weight: 130.2 kg (287 lb)  BSA (Calculated - sq m): 2.44 sq meters  BMI (Calculated): 47.8  Weight in (lb) to have BMI = 25: 149.9]  Wt Readings from Last 2 Encounters:   23 130.2 kg (287 lb)   23 131.1 kg (289 lb)       Physical Exam  Vitals and nursing note reviewed.   Eyes:      Pupils: Pupils are equal, round, and reactive to light.   Neurological:      General: No focal deficit present.          Laboratory  Lab Results   Component Value Date    WBC 5.98 2023    RBC 3.78 (L) 2023    HGB 10.5 (L) 2023    HCT 32.4 (L) 2023    MCV 86 2023    MCH 27.8 2023    MCHC 32.4 2023    RDW 14.6 (H) 2023     2023    MPV 11.1 2023    GRAN 3.5 2023    GRAN 58.0 2023    LYMPH 1.5 2023    LYMPH 25.4 2023    MONO 0.6 2023    MONO 9.9 2023    EOS 0.3 2023    BASO 0.04 2023    EOSINOPHIL 5.5 2023    BASOPHIL 0.7 2023       BMP  Lab Results   Component Value Date     2023    K 4.1 2023     2023    CO2 25 2023    BUN 30 (H) 2023    CREATININE 1.7 (H) 2023    CALCIUM 9.7 2023    ANIONGAP 6 (L) 2023    ESTGFRAFRICA 29.9 (A) 2022    EGFRNONAA 26.0 (A) 2022    AST 13 2023    ALT 12 2023    PROT 6.3 2023          No results found for: "IGE"     No results found for: "ASPERGILLUS"  No results found for: "AFUMIGATUSCL"     No results found for: "ACE"     Diagnostic Results:  I have personally reviewed today the following studies:  2023 - 2023  Patient ID: [id number]  : 1950  Age: 72 years  [facility]  [address] [facility], [address]  Compliance Report  Compliance  Payor Standard  Usage 2023 - " 12/05/2023  Usage days 30/30 days (100%)  >= 4 hours 29 days (97%)  < 4 hours 1 days (3%)  Usage hours 201 hours 0 minutes  Average usage (total days) 6 hours 42 minutes  Average usage (days used) 6 hours 42 minutes  Median usage (days used) 6 hours 43 minutes  Total used hours (value since last reset - 12/05/2023) 372 hours  AirSense 11 AutoSet  Serial number [id number]  Mode AutoSet  Min Pressure 5 cmH2O  Max Pressure 10 cmH2O  EPR Fulltime  EPR level 3  Response Standard  Therapy  Pressure - cmH2O Median: 6.0 95th percentile: 7.7 Maximum: 8.6  Leaks - L/min Median: 0.2 95th percentile: 2.6 Maximum: 56.8  Events per hour AI: 0.7 HI: 0.1 AHI: 0.8  Apnea Index Central: 0.1 Obstructive: 0.5 Unknown: 0.1  RERA Index 0.0       Assessment/Plan:     Problem List Items Addressed This Visit       Essential hypertension    Morbid obesity    Dyslipidemia associated with type 2 diabetes mellitus    Type 2 diabetes mellitus with diabetic polyneuropathy, with long-term current use of insulin    RUFINA (obstructive sleep apnea)         12/7/2023     1:52 PM   EPWORTH SLEEPINESS SCALE   Sitting and reading 1   Watching TV 1   Sitting, inactive in a public place (e.g. a theatre or a meeting) 0   As a passenger in a car for an hour without a break 0   Lying down to rest in the afternoon when circumstances permit 3   Sitting and talking to someone 0   Sitting quietly after a lunch without alcohol 0   In a car, while stopped for a few minutes in traffic 1   Total score 6      Airsense 11  Data 11/06/2023 to 12/05/2023  Usage > 4 hrs was 97%  APAp 5-10  AHI 0.8    USING AND BENEFITS         Asthma - Primary     FeNo was 7  Refilled Albuterol  Horace next visit  UTD immunisations             Discussed therapeutic goals for positive airway pressure therapy(CPAP or BiPAP):   Ideal is usage 100% of nights for 6 - 8 hours per night.   Minimum usage is 70% of night for at least 4 hours per night used.  Mask choices discussed.  Patient  expressed understanding. All Questions answered.      Follow up in about 1 year (around 12/7/2024), or downlaod.    This note was prepared using voice recognition system and is likely to have sound alike errors that may have been overlooked even after proof reading.  Please call me with any questions    Discussed diagnosis, its evaluation, treatment and usual course. All questions answered.    Thank you for the courtesy of participating in the care of this patient    Trent Leon MD      Personal Diagnostic Review  [x]  CXR    [x]  Sleep study    [x]  echo    []  6MWD    []  LABS    []  CHEST CT    []  PET CT    []  Biopsy results

## 2023-12-07 NOTE — PROGRESS NOTES
Pt has no active cardiac condition (ACS/USA, decompenstated CHF, significant arrhythmias or severe valvular disease) and had a negative stress echocardiogram.  As such, pt does not require further cardiac evaluation prior to undergoing surgery.  Pt should remain on beta-blockers throughout the entire radha-procedure time period.  Aspirin therapy can be held but should be restarted as soon as safely possible.  The remaining cardiac meds can be held as needed but should also be restarted after the procedure.     Her estimated risk with the proposed surgery/procedure for an adverse cardiac outcome is 10.0% (95% CI 8.2%-12.6%) (Revised Cardiac Risk Index [RCRI] Score = 2) based on the following risk factors: History of cerebrovascular disease and Use of insulin therapy for diabetes.

## 2023-12-08 RX ORDER — CHLORTHALIDONE 25 MG/1
25 TABLET ORAL
Qty: 30 TABLET | Refills: 0 | Status: SHIPPED | OUTPATIENT
Start: 2023-12-08

## 2023-12-12 ENCOUNTER — TELEPHONE (OUTPATIENT)
Dept: PODIATRY | Facility: CLINIC | Age: 73
End: 2023-12-12
Payer: COMMERCIAL

## 2023-12-12 NOTE — TELEPHONE ENCOUNTER
Pt is calling in wanting to know if the provider has an appt available for 11am or 1pm on 12/8/2023. Pt has been tao for 8:30 am just to hold the slot. Please call to advise     Left voice message for patient to give our office a call back at [phone]. Help with scheduling appointment

## 2023-12-13 ENCOUNTER — HOSPITAL ENCOUNTER (OUTPATIENT)
Dept: RADIOLOGY | Facility: HOSPITAL | Age: 73
Discharge: HOME | End: 2023-12-13
Attending: INTERNAL MEDICINE
Payer: MEDICARE

## 2023-12-13 ENCOUNTER — TELEPHONE (OUTPATIENT)
Dept: CARDIOLOGY | Facility: CLINIC | Age: 73
End: 2023-12-13
Payer: COMMERCIAL

## 2023-12-13 DIAGNOSIS — Z12.31 ENCOUNTER FOR SCREENING MAMMOGRAM FOR BREAST CANCER: ICD-10-CM

## 2023-12-13 PROCEDURE — 77067 SCR MAMMO BI INCL CAD: CPT | Mod: 26,,, | Performed by: RADIOLOGY

## 2023-12-13 PROCEDURE — 77063 BREAST TOMOSYNTHESIS BI: CPT | Mod: 26,,, | Performed by: RADIOLOGY

## 2023-12-13 PROCEDURE — 77067 MAMMO DIGITAL SCREENING BILAT WITH TOMO: ICD-10-PCS | Mod: 26,,, | Performed by: RADIOLOGY

## 2023-12-13 PROCEDURE — 77067 SCR MAMMO BI INCL CAD: CPT | Mod: TC

## 2023-12-13 PROCEDURE — 77063 MAMMO DIGITAL SCREENING BILAT WITH TOMO: ICD-10-PCS | Mod: 26,,, | Performed by: RADIOLOGY

## 2023-12-19 NOTE — PRE-PROCEDURE INSTRUCTIONS
12/19 The following pre-procedure instructions and arrival time have been reviewed with patient via phone and sent to patient portal for review.  Patient verbalized an understanding.       On the day of your pain procedure with Dr. Roa, please report to the [facility]   located at [address] [place] [address].  Please park in the lot in front of the building and enter at the main entrance.   Proceed to the second floor for registration.     Arrival time: 08:30 am     You may not drive home after your procedure.   You may not leave alone in an uber, taxi, etc.  You must be discharged to a responsible adult.   Please remain under adult supervision for 24 hours.   If possible, please have your visitor &/or ride home stay during your visit.   The surgeon should speak with your visitors after your procedure.  All visitors must be 18 years of age or older. Please limit your visitors to max of 2 people.        No food, no drink 8 hours prior to your arrival time.     You should take any medications that you routinely take for blood pressure, heart medications, thyroid, cholesterol, etc.      If you are a diabetic, do not take your medication if you will be fasting, but bring it with you.   Please plan on being here for roughly 2 hours.      *Hold vitamins, mineral, herbs (including herbal tea) and supplements the morning of your procedure.     *Hold all non-insulin shots until after surgery (Ozempic, Mounjaro, Trulicity, Victoza, Byetta, Wegovy and Adlyxin) (You should have been instructed to not take up to 7 days prior)     Shower the night before and the morning of your procedure with antibacterial soap (ex. dial antibacterial)     Please do not use antibacterial soap to wash your face. Use your regular face soap.     Do not apply any products to your skin nor your hair after you shower the morning of your procedure.  Products include: gels, creams, ointments, oils, powders, lotion, deodorant, make-up,  perfume/cologne, after shave and sunscreen.     Wear loose, comfortable clothing (preferably a button up shirt)  No contacts. Bring glasses if necessary.  If you have dentures, please bring a case.  Please leave all jewelry and valuables at home.     If you have been feeling sick (fever, chills, coughing, etc) or have been on any antibiotics within the last 2 weeks please contact your doctor's office at [phone] to reschedule.      Thanks,  Catina, LPN [facility]  Pre-Admit Dept

## 2023-12-20 ENCOUNTER — HOSPITAL ENCOUNTER (OUTPATIENT)
Facility: HOSPITAL | Age: 73
Discharge: HOME | End: 2023-12-20
Attending: STUDENT IN AN ORGANIZED HEALTH CARE EDUCATION/TRAINING PROGRAM | Admitting: STUDENT IN AN ORGANIZED HEALTH CARE EDUCATION/TRAINING PROGRAM
Payer: MEDICARE

## 2023-12-20 VITALS
DIASTOLIC BLOOD PRESSURE: 73 MMHG | HEART RATE: 65 BPM | RESPIRATION RATE: 18 BRPM | SYSTOLIC BLOOD PRESSURE: 147 MMHG | OXYGEN SATURATION: 98 %

## 2023-12-20 DIAGNOSIS — M47.816 FACET ARTHRITIS OF LUMBAR REGION: ICD-10-CM

## 2023-12-20 DIAGNOSIS — G89.29 CHRONIC PAIN: ICD-10-CM

## 2023-12-20 DIAGNOSIS — M51.36 DDD (DEGENERATIVE DISC DISEASE), LUMBAR: Primary | ICD-10-CM

## 2023-12-20 LAB — POCT GLUCOSE: 186 MG/DL (ref 70–110)

## 2023-12-20 PROCEDURE — 25000003 PHARM REV CODE 250: Performed by: STUDENT IN AN ORGANIZED HEALTH CARE EDUCATION/TRAINING PROGRAM

## 2023-12-20 PROCEDURE — 64636 DESTROY L/S FACET JNT ADDL: CPT | Mod: 50 | Performed by: STUDENT IN AN ORGANIZED HEALTH CARE EDUCATION/TRAINING PROGRAM

## 2023-12-20 PROCEDURE — 63600175 PHARM REV CODE 636 W HCPCS: Performed by: STUDENT IN AN ORGANIZED HEALTH CARE EDUCATION/TRAINING PROGRAM

## 2023-12-20 PROCEDURE — 64635 DESTROY LUMB/SAC FACET JNT: CPT | Mod: 50 | Performed by: STUDENT IN AN ORGANIZED HEALTH CARE EDUCATION/TRAINING PROGRAM

## 2023-12-20 PROCEDURE — 64635 DESTROY LUMB/SAC FACET JNT: CPT | Mod: 50,,, | Performed by: STUDENT IN AN ORGANIZED HEALTH CARE EDUCATION/TRAINING PROGRAM

## 2023-12-20 PROCEDURE — 82962 GLUCOSE BLOOD TEST: CPT | Performed by: STUDENT IN AN ORGANIZED HEALTH CARE EDUCATION/TRAINING PROGRAM

## 2023-12-20 PROCEDURE — 99153 MOD SED SAME PHYS/QHP EA: CPT | Performed by: STUDENT IN AN ORGANIZED HEALTH CARE EDUCATION/TRAINING PROGRAM

## 2023-12-20 PROCEDURE — 99152 MOD SED SAME PHYS/QHP 5/>YRS: CPT | Performed by: STUDENT IN AN ORGANIZED HEALTH CARE EDUCATION/TRAINING PROGRAM

## 2023-12-20 PROCEDURE — 64636 DESTROY L/S FACET JNT ADDL: CPT | Mod: 50,,, | Performed by: STUDENT IN AN ORGANIZED HEALTH CARE EDUCATION/TRAINING PROGRAM

## 2023-12-20 RX ORDER — BUPIVACAINE HYDROCHLORIDE 2.5 MG/ML
INJECTION, SOLUTION EPIDURAL; INFILTRATION; INTRACAUDAL
Status: DISCONTINUED | OUTPATIENT
Start: 2023-12-20 | End: 2023-12-20 | Stop reason: HOSPADM

## 2023-12-20 RX ORDER — LIDOCAINE HYDROCHLORIDE 20 MG/ML
INJECTION, SOLUTION EPIDURAL; INFILTRATION; INTRACAUDAL; PERINEURAL
Status: DISCONTINUED | OUTPATIENT
Start: 2023-12-20 | End: 2023-12-20 | Stop reason: HOSPADM

## 2023-12-20 RX ORDER — FENTANYL CITRATE 50 UG/ML
100 INJECTION, SOLUTION INTRAMUSCULAR; INTRAVENOUS ONCE
Status: COMPLETED | OUTPATIENT
Start: 2023-12-20 | End: 2023-12-20

## 2023-12-20 RX ORDER — DEXAMETHASONE SODIUM PHOSPHATE 10 MG/ML
INJECTION INTRAMUSCULAR; INTRAVENOUS
Status: DISCONTINUED | OUTPATIENT
Start: 2023-12-20 | End: 2023-12-20 | Stop reason: HOSPADM

## 2023-12-20 RX ORDER — MIDAZOLAM HYDROCHLORIDE 1 MG/ML
2 INJECTION INTRAMUSCULAR; INTRAVENOUS
Status: DISCONTINUED | OUTPATIENT
Start: 2023-12-20 | End: 2023-12-20 | Stop reason: HOSPADM

## 2023-12-20 RX ORDER — SODIUM CHLORIDE 9 MG/ML
INJECTION, SOLUTION INTRAVENOUS CONTINUOUS
Status: DISCONTINUED | OUTPATIENT
Start: 2023-12-20 | End: 2023-12-20 | Stop reason: HOSPADM

## 2023-12-20 RX ORDER — FENTANYL CITRATE 50 UG/ML
INJECTION, SOLUTION INTRAMUSCULAR; INTRAVENOUS
Status: DISCONTINUED | OUTPATIENT
Start: 2023-12-20 | End: 2023-12-20 | Stop reason: HOSPADM

## 2023-12-20 NOTE — DISCHARGE INSTRUCTIONS
Home Care Instructions Pain Management:    1.  DIET:    You may resume your normal diet today.    2.  BATHING:    You may shower with luke warm water.    3.  DRESSING:    You may remove your bandage today.    4.  ACTIVITY LEVEL:      You may resume your normal activities 24 hours after your procedure.    5.  MEDICATIONS:    You may resume your normal medications today.    6.  SPECIAL INSTRUCTIONS:    No heat to the injection site for 24 hours including bath or shower, heating pad, moist heat or hot tubs.    Use an ice pack to the injection site for any pain or discomfort.  Apply ice packs for 20 minute intervals as needed.    If you have received any sedatives by mouth today, you can not drive for 12 hours.    If you have received sedation through an IV, you can not drive for 24 hours.    PLEASE CALL YOUR DOCTOR FOR THE FOLLOWIN.  Redness or swelling around the injection site.  2.  Fever of 101 degrees.  3.  Drainage (pus) from the injection site.  4.  For any continuous bleeding (some dried blood over the incision is normal.)    FOR EMERGENCIES:    If any unusual problems or difficulties occur during clinic hours, call [phone] or dial [phone].    Follow up with with your physician in 2-3 weeks.

## 2023-12-20 NOTE — PLAN OF CARE
Discharge instructions given and explained to patient with verbalization of understanding all instructions. . Patients v/s stable, denies n/v and tolerating po, rates pain level tolerable, IV removed, pt able to get to wheelchair with assistance. Pt usually walks with walker. No c/o numbness or tingling. Escorted via wheelchair.

## 2023-12-20 NOTE — DISCHARGE SUMMARY
Discharge Note  Short Stay      SUMMARY     Admit Date: 12/20/2023    Attending Physician: Jameel Ashley      Discharge Physician: Jameel Ashley      Discharge Date: 12/20/2023 10:38 AM    Procedure(s) (LRB):  Radiofrequency Ablation, Nerve, Spinal, Lumbar, bilateral L4/5 L5/S1 (Bilateral)    Final Diagnosis: Lumbar spondylosis [M47.816]    Disposition: Home or self care    Patient Instructions:   Current Discharge Medication List        CONTINUE these medications which have NOT CHANGED    Details   amLODIPine (NORVASC) 10 MG tablet TAKE 1 TABLET BY MOUTH ONCE  DAILY  Qty: 90 tablet, Refills: 3    Comments: Please send a replace/new response with 100-Day Supply if appropriate to maximize member benefit. Requesting 1 year supply. - .  Associated Diagnoses: Essential hypertension      aspirin 81 MG Chew Take 1 tablet (81 mg total) by mouth once daily.  Refills: 0      atorvastatin (LIPITOR) 80 MG tablet Take 1 tablet (80 mg total) by mouth every evening.  Qty: 30 tablet, Refills: 11      chlorthalidone (HYGROTEN) 25 MG Tab Take 1 tablet by mouth once daily  Qty: 30 tablet, Refills: 0    Comments: .      DEXCOM G7  Misc Use as directed  Qty: 1 each, Refills: 0    Associated Diagnoses: Type 2 diabetes mellitus with diabetic polyneuropathy, unspecified whether long term insulin use      dulaglutide (TRULICITY) 4.5 mg/0.5 mL pen injector Inject 4.5 mg into the skin every 7 days.  Qty: 12 pen, Refills: 3    Associated Diagnoses: Type 2 diabetes mellitus with stage 3b chronic kidney disease and hypertension      DULoxetine (CYMBALTA) 30 MG capsule Take 2 capsules (60 mg total) by mouth once daily.  Qty: 180 capsule, Refills: 3    Comments: Dose adjustment - patient doesn't need to fill just yet.  Associated Diagnoses: Lumbar facet arthropathy; Spinal stenosis of lumbar region, unspecified whether neurogenic claudication present; Diabetic autonomic neuropathy associated with type 2 diabetes mellitus; Chronic low back  "pain with sciatica, sciatica laterality unspecified, unspecified back pain laterality; Primary osteoarthritis of right knee; Polyneuropathy associated with underlying disease      insulin lispro (HUMALOG KWIKPEN INSULIN) 100 unit/mL pen INJECT 13 UNITS INTO THE  SKIN 3 TIMES DAILY BEFORE  MEALS PLUS SLIDING SCALE -  MAX TOTAL DAILY DOSE 70  UNITS  Qty: 30 mL, Refills: 6    Comments: Requesting 1 year supply  Associated Diagnoses: Type 2 diabetes mellitus with diabetic polyneuropathy, with long-term current use of insulin      irbesartan (AVAPRO) 300 MG tablet TAKE 1 TABLET BY MOUTH IN THE  EVENING  Qty: 90 tablet, Refills: 3    Comments: Please send a replace/new response with 100-Day Supply if appropriate to maximize member benefit. Requesting 1 year supply. - .  Associated Diagnoses: Essential hypertension      JARDIANCE 10 mg tablet TAKE 1 TABLET BY MOUTH ONCE  DAILY  Qty: 90 tablet, Refills: 3    Comments: Please send a replace/new response with 100-Day Supply if appropriate to maximize member benefit. Requesting 1 year supply.  Associated Diagnoses: Type 2 diabetes mellitus with stage 3b chronic kidney disease and hypertension; Stage 3b chronic kidney disease      labetaloL (NORMODYNE) 300 MG tablet Take 1 tablet (300 mg total) by mouth 2 (two) times daily.  Qty: 180 tablet, Refills: 3    Comments: .      multivitamin (THERAGRAN) per tablet Take 1 tablet by mouth once daily.      pregabalin (LYRICA) 150 MG capsule TAKE 1 CAPSULE BY MOUTH TWICE  DAILY  Qty: 60 capsule, Refills: 5    Associated Diagnoses: Type 2 diabetes mellitus with diabetic polyneuropathy, with long-term current use of insulin      albuterol (PROAIR HFA) 90 mcg/actuation inhaler Inhale 2 puffs into the lungs every 6 (six) hours as needed for Wheezing. Rescue  Qty: 18 g, Refills: 6    Associated Diagnoses: Mild intermittent asthma without complication      allopurinoL (ZYLOPRIM) 300 MG tablet       BD ULTRA-FINE KIKA PEN NEEDLE 32 gauge x 5/32" " Ndle USE 4 TIMES DAILY  Qty: 200 each, Refills: 20    Associated Diagnoses: Type 2 diabetes mellitus with diabetic polyneuropathy, with long-term current use of insulin      blood sugar diagnostic Strp 1 strip 4 times daily. Contour Next.  Qty: 200 strip, Refills: 11    Associated Diagnoses: Type 2 diabetes mellitus with diabetic polyneuropathy, with long-term current use of insulin      blood-glucose meter kit 1 each by Other route once daily. Use daily. Insurance proffered one touch  E11.42  Qty: 1 each, Refills: 0      !! blood-glucose sensor (DEXCOM G7 SENSOR MISC) by Misc.(Non-Drug; Combo Route) route.      blood-glucose transmitter (DEXCOM G6 TRANSMITTER) Terese 1 Device by Misc.(Non-Drug; Combo Route) route every 3 (three) months.  Qty: 1 each, Refills: 3      CONSTULOSE 10 gram/15 mL solution SMARTSI Milliliter(s) By Mouth Twice Daily PRN      !! DEXCOM G7 SENSOR Terese 1 Device by Misc.(Non-Drug; Combo Route) route every 10 days.  Qty: 3 each, Refills: 11    Associated Diagnoses: Type 2 diabetes mellitus with diabetic polyneuropathy, unspecified whether long term insulin use      famotidine (PEPCID) 20 MG tablet Take 1 tablet (20 mg total) by mouth 2 (two) times daily.  Qty: 1 tablet, Refills: 0      finasteride (PROSCAR) 5 mg tablet Take 1 tablet (5 mg total) by mouth once daily.  Qty: 30 tablet, Refills: 11    Associated Diagnoses: Androgenic alopecia      glucagon (GVOKE HYPOPEN 1-PACK) 0.5 mg/0.1 mL AtIn Inject 1 Dose into the skin daily as needed (for severe hypoglycemia if you aren't able to treat by ingesting sugar).  Qty: 1 Syringe, Refills: 3    Associated Diagnoses: Type 2 diabetes mellitus with diabetic polyneuropathy, with long-term current use of insulin      ketoconazole (NIZORAL) 2 % shampoo Wash hair with medicated shampoo at least 2x/week - let sit on scalp at least 5 minutes prior to rinsing  Qty: 120 mL, Refills: 5    Associated Diagnoses: Androgenic alopecia      lancets 31 gauge Misc 1  lancet by Misc.(Non-Drug; Combo Route) route 4 (four) times daily. E11.42 . Prescribed one touch  Qty: 200 each, Refills: 6    Associated Diagnoses: Type 2 diabetes mellitus with diabetic polyneuropathy, unspecified whether long term insulin use      sodium bicarbonate 325 MG tablet Take 2 tablets (650 mg total) by mouth 2 (two) times daily.  Qty: 120 tablet, Refills: 11    Associated Diagnoses: Essential hypertension; Stage 3b chronic kidney disease; Acidosis, metabolic      TRESIBA FLEXTOUCH U-100 100 unit/mL (3 mL) insulin pen INJECT 40 UNITS INTO THE SKIN  ONCE DAILY  Qty: 15 mL, Refills: 8    Comments: Requesting 1 year supply  Associated Diagnoses: Type 2 diabetes mellitus with diabetic polyneuropathy, with long-term current use of insulin       !! - Potential duplicate medications found. Please discuss with provider.              Discharge Diagnosis: Lumbar spondylosis [M47.816]  Condition on Discharge: Stable with no complications to procedure   Diet on Discharge: Same as before.  Activity: as per instruction sheet.  Discharge to: Home with a responsible adult.  Follow up: 2-4 weeks       Please call my office or pager at [phone] if experienced any weakness or loss of sensation, fever > 101.5, pain uncontrolled with oral medications, persistent nausea/vomiting/or diarrhea, redness or drainage from the incisions, or any other worrisome concerns. If physician on call was not reached or could not communicate with our office for any reason please go to the nearest emergency department

## 2023-12-20 NOTE — H&P
HPI  Patient presenting for Procedure(s) (LRB):  Radiofrequency Ablation, Nerve, Spinal, Lumbar, bilateral L4/5 L5/S1 (Bilateral)     Patient on Anti-coagulation No    No health changes since previous encounter    Past Medical History:   Diagnosis Date    Asthma     Chronic obstructive pulmonary disease, unspecified COPD type 2022    Constipation 10/3/2019    Deep vein thrombophlebitis of left leg 2012    Deep vein thrombosis     when she had a knee replacement    Diabetic foot ulcer with osteomyelitis     Encounter for blood transfusion     anemic     GERD (gastroesophageal reflux disease)     Obesity, diabetes, and hypertension syndrome 2019    Obstructive sleep apnea 2012    PAD (peripheral artery disease) 2013    Pressure ulcer of toe of left foot     Type 2 diabetes mellitus with obesity 2020    Type 2 diabetes mellitus with peripheral artery disease 2020     Past Surgical History:   Procedure Laterality Date    CATARACT EXTRACTION W/  INTRAOCULAR LENS IMPLANT Left 3/2002    left     CATARACT EXTRACTION W/  INTRAOCULAR LENS IMPLANT Right     OD dr. olivares     SECTION      COLONOSCOPY N/A 2018    Procedure: COLONOSCOPY;  Surgeon: Faizan Mac MD;  Location: [facility] ([address] [place] [address]);  Service: Endoscopy;  Laterality: N/A;    COLONOSCOPY N/A 2023    Procedure: COLONOSCOPY;  Surgeon: Pa Zamora MD;  Location: [facility] ([address] [place] [address]);  Service: Endoscopy;  Laterality: N/A;    CYST REMOVAL  2011    left side of face    CYSTOSCOPY W/ RETROGRADES Left 2020    Procedure: CYSTOSCOPY, WITH RETROGRADE PYELOGRAM;  Surgeon: Noman Rivas MD;  Location: [address];  Service: Urology;  Laterality: Left;  30min    DE QUERVAIN'S RELEASE  2021    Procedure: RELEASE, HAND, FOR DEQUERVAIN'S TENOSYNOVITIS;  Surgeon: Marky Hagen MD;  Location: [facility];  Service: Orthopedics;;    ESOPHAGOGASTRODUODENOSCOPY N/A 2023    Procedure: EGD  (ESOPHAGOGASTRODUODENOSCOPY);  Surgeon: Pa Zamora MD;  Location: [facility]  Service: Endoscopy;  Laterality: N/A;  suprep-[facility] mail-bmi 49.09-tb  4/20/23- No answer for precall- [place]    EYE SURGERY Bilateral 2012    eye implants    GANGLION CYST EXCISION  11/13/2007    Right wrist    INJECTION OF ANESTHETIC AGENT AROUND MEDIAL BRANCH NERVES INNERVATING LUMBAR FACET JOINT Bilateral 4/5/2022    Procedure: Block-nerve-medial branch-lumbar bilateral L4-5 and L5-S1;  Surgeon: Alireza Meraz Jr., MD;  Location: [facility]IN [facility]GT;  Service: Pain Management;  Laterality: Bilateral;  pt is diabetic   asa    INJECTION OF ANESTHETIC AGENT AROUND MEDIAL BRANCH NERVES INNERVATING LUMBAR FACET JOINT Bilateral 4/19/2022    Procedure: Block-nerve-medial branch-lumbar bilaterl L4-5 and L5-S1;  Surgeon: Alireza Meraz Jr., MD;  Location: [facility];  Service: Pain Management;  Laterality: Bilateral;  pt is diabetic     INJECTION OF FACET JOINT Bilateral 5/6/2021    Procedure: INJECTION, FACET JOINT--Bilateral L4-5, L5-S1;  Surgeon: Alireza Meraz Jr., MD;  Location: [facility];  Service: Pain Management;  Laterality: Bilateral;  Oral    INTERPOSITION ARTHROPLASTY OF CARPOMETACARPAL JOINTS Left 1/8/2021    Procedure: INTERPOSITION ARTHROPLASTY, CMC JOINT - left dequervains and cmc arthroplasty, arthrex;  Surgeon: Marky Hagen MD;  Location: [facility] OR;  Service: Orthopedics;  Laterality: Left;    JOINT REPLACEMENT Left     Pan Retinal Photocoagulation Bilateral     Dr. Monterroso (Proliferative Diabetic Retinopathy)    RADIOFREQUENCY THERMOCOAGULATION Right 5/12/2022    Procedure: RADIOFREQUENCY THERMAL COAGULATION RIGHT L4-5, L5-S1 (IV Sedation);  Surgeon: Alireza Meraz Jr., MD;  Location: [facility];  Service: Pain Management;  Laterality: Right;  diabetic    RADIOFREQUENCY THERMOCOAGULATION Left 5/19/2022    Procedure: RADIOFREQUENCY THERMAL COAGULATION LEFT L4-5, L5-S1 (IV Sedation);  Surgeon: Alireza  BRIANDA Meraz Jr., MD;  Location: [facility];  Service: Pain Management;  Laterality: Left;  diabetic    TOTAL ABDOMINAL HYSTERECTOMY  1982    TOTAL KNEE ARTHROPLASTY  2/2006    left    TRIGGER FINGER RELEASE  9/18/2009     Review of patient's allergies indicates:   Allergen Reactions    Clindamycin Hives and Swelling    Iodinated contrast media Rash      Current Facility-Administered Medications   Medication    0.9%  NaCl infusion    fentaNYL 50 mcg/mL injection 100 mcg    midazolam (VERSED) 1 mg/mL injection 2 mg       PMHx, PSHx, Allergies, Medications reviewed in epic    ROS negative except pain complaints in HPI    OBJECTIVE:    Breastfeeding No     PHYSICAL EXAMINATION:    GENERAL: Well appearing, in no acute distress, alert and oriented x3.  PSYCH:  Mood and affect appropriate.  SKIN: Skin color, texture, turgor normal, no rashes or lesions which will impact the procedure.  CV: RRR with palpation of the radial artery.  PULM: No evidence of respiratory difficulty, symmetric chest rise. Clear to auscultation.  NEURO: Cranial nerves grossly intact.    Plan:    Proceed with procedure as planned Procedure(s) (LRB):  Radiofrequency Ablation, Nerve, Spinal, Lumbar, bilateral L4/5 L5/S1 (Bilateral)    Jameel Ashley  12/20/2023

## 2023-12-20 NOTE — PLAN OF CARE
POC explained to pt, questions encouraged, all questions answered.  Pt denies HA,SOB or Chest pain.  Ready to proceed.

## 2023-12-30 DIAGNOSIS — E11.22 TYPE 2 DIABETES MELLITUS WITH STAGE 3B CHRONIC KIDNEY DISEASE AND HYPERTENSION: ICD-10-CM

## 2023-12-30 DIAGNOSIS — N18.32 TYPE 2 DIABETES MELLITUS WITH STAGE 3B CHRONIC KIDNEY DISEASE AND HYPERTENSION: ICD-10-CM

## 2023-12-30 DIAGNOSIS — I12.9 TYPE 2 DIABETES MELLITUS WITH STAGE 3B CHRONIC KIDNEY DISEASE AND HYPERTENSION: ICD-10-CM

## 2024-01-02 RX ORDER — DULAGLUTIDE 4.5 MG/.5ML
INJECTION, SOLUTION SUBCUTANEOUS
Qty: 12 PEN | Refills: 3 | Status: SHIPPED | OUTPATIENT
Start: 2024-01-02

## 2024-01-03 NOTE — PROCEDURES
Therapeutic Lumbar Medial Branch Radiofrequency Ablation under Fluoroscopy     The procedure, risks, benefits, and options were discussed with the patient. There are no contraindications to the procedure. The patent expressed understanding and agreed to the procedure. Informed written consent was obtained prior to the start of the procedure and can be found in the patient's chart.        PATIENT NAME: Annika Mac   MRN: [id number]     DATE OF PROCEDURE: 01/03/2024     PROCEDURE:  Bilateral L3, L4, and L5 Lumbar Radiofrequency Ablation under Fluoroscopy    PRE-OP DIAGNOSIS: Lumbar spondylosis [M47.816] Lumbar spondylosis [M47.816]    POST-OP DIAGNOSIS: Same    PHYSICIAN: Alejandrina Roa DO    ASSISTANTS: Jameel Ashley MD     MEDICATIONS INJECTED:  Preservative-free Decadron 10mg with 9cc of Bupivicaine 0.25%    LOCAL ANESTHETIC INJECTED:   Xylocaine 2%    SEDATION: Versed 2mg and Fentanyl 50mcg                                                                                                                                                                                     Conscious sedation ordered by M.D. Patient re-evaluation prior to administration of conscious sedation. No changes noted in patient's status from initial evaluation. The patient's vital signs were monitored by RN and patient remained hemodynamically stable throughout the procedure.    Event Time In   Sedation Start 0940   Sedation End 1021       ESTIMATED BLOOD LOSS:  None    COMPLICATIONS:  None     INTERVAL HISTORY: Patient has clinical and imaging findings suggestive of recurrent facet mediated pain. Patient has undergone a previous RFA at specified levels with at least 50% relief for at least 6 months. Successful diagnostic medial branch blocks have been completed within the past 2 years.    TECHNIQUE: Time-out was performed to identify the patient and procedure to be performed. With the patient laying in a prone position, the surgical area was  prepped and draped in the usual sterile fashion using ChloraPrep and fenestrated drape. The levels were determined under fluoroscopic guidance. Skin anesthesia was achieved by injecting Lidocaine 2% over the injection sites. A 18 gauge 10mm curved active tip needle was introduced to the anatomic local of the medial branch at each of the above levels using AP, lateral and/or contralateral oblique fluoroscopic imaging. Then sensory and motor testing was performed to confirm that the needle tips were in the correct location. After negative aspiration for blood or CSF was confirmed, 1 mL of the lidocaine 2% listed above was injected slowly at each site. This was followed by thermal lesioning at 80 degrees celsius for 90 seconds. That was followed by slowly injecting 1 mL of the medication mixture listed above at each site. The needles were removed and bleeding was nil. A sterile dressing was applied. No specimens collected. The patient tolerated the procedure well and did not have any procedure related motor deficit at the conclusion of the procedure.    The patient was monitored after the procedure in the recovery area. They were given post-procedure and discharge instructions to follow at home. The patient was discharged in a stable condition.    I reviewed and edited the fellow's note. I conducted my own interview and physical examination. I agree with the findings. I was present and supervising all critical portions of the procedure.    Alejandrina Roa DO

## 2024-01-04 ENCOUNTER — OFFICE VISIT (OUTPATIENT)
Dept: PAIN MEDICINE | Facility: CLINIC | Age: 74
End: 2024-01-04
Payer: MEDICARE

## 2024-01-04 VITALS
BODY MASS INDEX: 48.58 KG/M2 | DIASTOLIC BLOOD PRESSURE: 67 MMHG | HEIGHT: 65 IN | WEIGHT: 291.56 LBS | SYSTOLIC BLOOD PRESSURE: 124 MMHG | HEART RATE: 67 BPM

## 2024-01-04 DIAGNOSIS — M51.36 DDD (DEGENERATIVE DISC DISEASE), LUMBAR: ICD-10-CM

## 2024-01-04 DIAGNOSIS — M54.50 DORSALGIA OF LUMBAR REGION: ICD-10-CM

## 2024-01-04 DIAGNOSIS — G89.4 CHRONIC PAIN SYNDROME: ICD-10-CM

## 2024-01-04 DIAGNOSIS — M54.16 LUMBAR RADICULOPATHY: Primary | ICD-10-CM

## 2024-01-04 DIAGNOSIS — M47.816 FACET ARTHRITIS OF LUMBAR REGION: ICD-10-CM

## 2024-01-04 DIAGNOSIS — M47.816 LUMBAR SPONDYLOSIS: ICD-10-CM

## 2024-01-04 PROCEDURE — 99215 OFFICE O/P EST HI 40 MIN: CPT | Mod: PBBFAC,PO | Performed by: NURSE PRACTITIONER

## 2024-01-04 PROCEDURE — 99999 PR PBB SHADOW E&M-EST. PATIENT-LVL V: CPT | Mod: PBBFAC,,, | Performed by: NURSE PRACTITIONER

## 2024-01-04 PROCEDURE — 99214 OFFICE O/P EST MOD 30 MIN: CPT | Mod: S$GLB,,, | Performed by: NURSE PRACTITIONER

## 2024-01-04 NOTE — PROGRESS NOTES
[facility] Established  Patient Evaluation    Referred by: Dr Marino Ragland  Reason for referral: Right Knee    CC:   Chief Complaint   Patient presents with    Low-back Pain    Leg Pain    Knee Pain   8/26/2014   Pain Disability Index (PDI) 34 21       Interval History 1/4/2024:  74 y/o female with a hx of chronic low back pain, she is s/p  Bilateral L3, L4, and L5 Lumbar Radiofrequency on 01/03/2024 reporting 50% and improved functionality.  She is requesting aqua therapy today she does report improved functionality following her most recent procedure.  .     Interval Updates: 11/14/2023: Annika Mac low back pain, right knee pain, pain at base of right thumb.  Low back pain is worse. She previously underwent Lumbar RFA with >50% relief for 1 year. Pain has now returned.  Discussed repeating RFA.   Her right knee needs to be replaced but needs to lose weight first.  Right thumb pain worse with typing. Pain noted at the base of the thumb. She is right handed.     06/07/2022 -  Bubba returns to clinic s/p  Lumbar Medial Branch Radiofrequency Ablation on 5/19/22 with 50% relief of her low back pain.  She is reporting new pain today this pain starts in her left buttocks and radiates down the posterior aspect of her leg to her foot.  Pain is worse when sitting patient currently therapy for low back states that some of the exercises are helping, however this pain is been ongoing for the last 2 weeks and she would like to be considered for injections to help.  She reports a pain intensity 8/10 today with a weekly range of 8-9/10.  The pain is described as Aching, Sharp and Shooting.  Pain is worsened by: sitting and improved by: nothing.      4/11/2022  -  Bubba returns to clinic s/p a Diagnostic Bilateral Lumbar MBB targeting L4/5 and L5/S1 on 4/2/22 with 90% relief with improved functionality.   She reports a pain intensity 6/10 today with a weekly range of 6-7/10.     Interval Updates:  3/28/2022 - -  "Ms. Mac is following up for Establish Care  Pain is currently rate 8/10 with a weekly range 8-9/10.  Currently her low back pain and his worst we will begin addressing this 1st.  She has seen orthopedics/Dr. Linares he wants her to lose 10 lb prior to consenting her for right TKA.  Sparse her low back pain she actually received benefit from a previous bilateral lumbar facet injection 5% we targeted the L4-5 L5-S1 facet joints.  She complains of low back pain that has continued, pain is located across her low back with mild radiation into her legs bilaterally.  She denies any recent incident, denies any profound weakness, denies any bowel bladder dysfunction, denies any saddle anesthesia at this time.     5/21/21 - Ms. Mac returns to clinic for follow up visit reporting stable low back pain.  Patient is s/p Bilateral L4-5 and L5-S1 Lumbar Facet Injection on 5/6/21 with 75% relief for a" few days and then pain returned"  Pain intensity is currently 7/10.      HPI:   Annika Mac is a 70 y.o. female with numerous problems resulting from, or exacerbated by, morbid obesity who presents complaining of chronic low back pain.  She states longstanding low back pain with a minor component of pain radiating into the back of the legs, all way down to the calf, bilaterally.  She reports rapidly increasing back pain with standing or walking for more than 5 min, requiring her to sit and rest.  Pain decreases very quickly when resting.  In 2014, she received a bilateral facet steroid injection in the low back.  At that time, the provider documented 90% relief of pain.    Location: low back, left buttocks, left posterior leg to ankle  Onset: 2-3 months ago  Current Pain Score: 7/10  Daily Pain of Range: 5-8/10  Quality: Aching, Sharp and Shooting  Radiation: Buttocks  Worsened by: daily activity  Improved by: nothing    Previous Therapies:  PT/OT: Yes, and currently restarting PT  HEP: no  Interventions:   - 01/03/2024 Bilateral " "L3, L4, and L5 Lumbar Radiofrequency Ablation 50%  4/5/2022 Bilateral facet injection L4-5 and L5-S1 in 2014 with 90% relief  Surgery: Denies back surgery  Medications:   - NSAIDS:   - MSK Relaxants:   - TCAs:   - SNRIs:   - Topicals:   - Anticonvulsants:  - Opioids:     Current Pain Medications:  Lyrica 150 mg b.i.d.  Cymbalta 30 mg daily    Review of Systems:  Review of Systems   Constitutional:  Negative for chills and fever.   HENT:  Negative for nosebleeds.    Eyes:  Negative for blurred vision.   Respiratory:  Negative for hemoptysis.    Cardiovascular:  Negative for chest pain and palpitations.   Gastrointestinal:  Negative for heartburn and vomiting.   Genitourinary:  Negative for hematuria.   Musculoskeletal:  Positive for back pain and joint pain. Negative for falls and myalgias.   Skin:  Negative for rash.   Neurological:  Negative for tingling, focal weakness, seizures and loss of consciousness.   Endo/Heme/Allergies:  Does not bruise/bleed easily.   Psychiatric/Behavioral:  Negative for hallucinations.        History:    Current Outpatient Medications:     albuterol (PROAIR HFA) 90 mcg/actuation inhaler, Inhale 2 puffs into the lungs every 6 (six) hours as needed for Wheezing. Rescue, Disp: 18 g, Rfl: 6    allopurinoL (ZYLOPRIM) 300 MG tablet, Take 1 tablet (300 mg total) by mouth once daily., Disp: 90 tablet, Rfl: 4    amLODIPine (NORVASC) 10 MG tablet, Take 1 tablet (10 mg total) by mouth once daily., Disp: 90 tablet, Rfl: 3    ammonium lactate 12 % Crea, Apply twice daily to affected parts both feet as needed., Disp: 140 g, Rfl: 11    aspirin 81 MG Chew, Take 1 tablet (81 mg total) by mouth once daily., Disp: , Rfl: 0    BD ULTRA-FINE KIKA PEN NEEDLE 32 gauge x 5/32" Ndle, To use 4 times daily, Disp: 200 each, Rfl: 20    betamethasone dipropionate (DIPROLENE) 0.05 % cream, Apply topically 2 (two) times daily. Prn hand rash, Disp: 45 g, Rfl: 0    blood sugar diagnostic Strp, 1 each by Misc.(Non-Drug; " Combo Route) route 3 (three) times daily. Dx code  E11.42 . Contour Next, Disp: 200 each, Rfl: 12    blood-glucose meter kit, 1 each by Other route once daily. Use daily. Insurance proffered one touch  E11.42 (Patient taking differently: 1 each by Other route once daily. Contour next .  Insurance proffered one touch  E11.42), Disp: 1 each, Rfl: 0    blood-glucose sensor (DEXCOM G6 SENSOR) Terese, Use as Directed, Disp: 2 Device, Rfl: 11    blood-glucose transmitter (DEXCOM G6 TRANSMITTER) Terese, Every 3 months. Use as Directed to check blood glucose., Disp: 1 Device, Rfl: 3    chlorthalidone (HYGROTEN) 25 MG Tab, Take 1 tablet (25 mg total) by mouth once daily., Disp: 30 tablet, Rfl: 11    colchicine (COLCRYS) 0.6 mg tablet, TAKE 1 TABLET EVERY OTHER DAY, Disp: 45 tablet, Rfl: 3    diclofenac sodium (VOLTAREN) 1 % Gel, Apply 2 g topically 4 (four) times daily., Disp: 1 Tube, Rfl: 2    DULoxetine (CYMBALTA) 30 MG capsule, Take 1 capsule (30 mg total) by mouth once daily., Disp: 90 capsule, Rfl: 3    famotidine (PEPCID) 20 MG tablet, Take 1 tablet (20 mg total) by mouth 2 (two) times daily., Disp: 1 tablet, Rfl: 0    fexofenadine (ALLEGRA) 180 MG tablet, TAKE 1 TABLET BY MOUTH ONCE DAILY, Disp: 30 tablet, Rfl: 0    glucagon (GVOKE HYPOPEN 1-PACK) 0.5 mg/0.1 mL AtIn, Inject 1 Dose into the skin daily as needed (for severe hypoglycemia if you aren't able to treat by ingesting sugar). (Patient not taking: Reported on 5/17/2021), Disp: 1 Syringe, Rfl: 3    HYDROcodone-acetaminophen (NORCO) 7.5-325 mg per tablet, Take 1 tablet by mouth every 6 (six) hours as needed. (Patient not taking: Reported on 4/7/2021), Disp: 28 tablet, Rfl: 0    insulin NPH-insulin regular, 70/30, (NOVOLIN 70/30 U-100 INSULIN) 100 unit/mL (70-30) injection, 120 UNITS INTO THE SKIN WITH BREAKFAST, 110 UNITS WITH LUNCH, AND INJECT 90 UNITS WITH DINNER ; . (Patient taking differently: 110 UNITS INTO THE SKIN WITH BREAKFAST, 85 UNITS WITH LUNCH, AND  INJECT 65 UNITS WITH DINNER ; .), Disp: 30 mL, Rfl: 3    irbesartan (AVAPRO) 300 MG tablet, Take 1 tablet (300 mg total) by mouth every evening., Disp: 90 tablet, Rfl: 3    ketoconazole (NIZORAL) 2 % cream, Apply topically once daily., Disp: 1 Tube, Rfl: 2    lancets 31 gauge Misc, 1 lancet by Misc.(Non-Drug; Combo Route) route 4 (four) times daily. E11.42 . Prescribed one touch, Disp: 200 each, Rfl: 6    LIDOcaine HCL 2% (XYLOCAINE) 2 % jelly, Apply topically as needed. Apply topically once nightly to affected part of foot/feet. (Patient not taking: Reported on 5/17/2021), Disp: 30 mL, Rfl: 2    methylPREDNISolone (MEDROL DOSEPACK) 4 mg tablet, use as directed (Patient not taking: Reported on 5/17/2021), Disp: 1 Package, Rfl: 0    multivitamin (THERAGRAN) per tablet, Take 1 tablet by mouth once daily., Disp: , Rfl:     pregabalin (LYRICA) 150 MG capsule, Take 1 capsule (150 mg total) by mouth 2 (two) times daily., Disp: 180 capsule, Rfl: 3    semaglutide (OZEMPIC) 1 mg/dose (2 mg/1.5 mL) PnIj, Inject 0.75 mLs into the skin every 7 days. (1 mg every week), Disp: 9 mL, Rfl: 4    simvastatin (ZOCOR) 40 MG tablet, Take 1 tablet (40 mg total) by mouth every evening., Disp: 90 tablet, Rfl: 3    sodium bicarbonate 325 MG tablet, Take 2 tablets (650 mg total) by mouth 2 (two) times daily., Disp: 120 tablet, Rfl: 11    topiramate (TOPAMAX) 50 MG tablet, Take 1 tablet (50 mg total) by mouth 2 (two) times daily., Disp: 180 tablet, Rfl: 0    traMADol (ULTRAM) 50 mg tablet, Take 1 tablet (50 mg total) by mouth every 8 (eight) hours as needed., Disp: 90 tablet, Rfl: 3  No current facility-administered medications for this visit.    Facility-Administered Medications Ordered in Other Visits:     fentaNYL injection 25 mcg, 25 mcg, Intravenous, Q5 Min PRN, Desmond Fontana MD, 50 mcg at 01/08/21 0955    midazolam (VERSED) 1 mg/mL injection 0.5 mg, 0.5 mg, Intravenous, PRN, Desmond Fontana MD, 2 mg at  21 0955    Past Medical History:   Diagnosis Date    Asthma     Constipation 10/3/2019    Deep vein thrombophlebitis of left leg 2012    Deep vein thrombosis     when she had a knee replacement    Encounter for blood transfusion     anemic     GERD (gastroesophageal reflux disease)     Obesity, diabetes, and hypertension syndrome 2019    Obstructive sleep apnea 2012    PAD (peripheral artery disease) 2013    Type 2 diabetes mellitus with obesity 2020    Type 2 diabetes mellitus with peripheral artery disease 2020       Past Surgical History:   Procedure Laterality Date    CATARACT EXTRACTION W/  INTRAOCULAR LENS IMPLANT Left 3/2002    left     CATARACT EXTRACTION W/  INTRAOCULAR LENS IMPLANT Right     OD dr. olivares     SECTION      COLONOSCOPY N/A 2018    Procedure: COLONOSCOPY;  Surgeon: Faizan Mac MD;  Location: [facility] (2ND FLR);  Service: Endoscopy;  Laterality: N/A;    CYST REMOVAL  2011    left side of face    CYSTOSCOPY W/ RETROGRADES Left 2020    Procedure: CYSTOSCOPY, WITH RETROGRADE PYELOGRAM;  Surgeon: Noman Rivas MD;  Location: [facility] 1ST FLR;  Service: Urology;  Laterality: Left;  30min    DE QUERVAIN'S RELEASE  2021    Procedure: RELEASE, HAND, FOR DEQUERVAIN'S TENOSYNOVITIS;  Surgeon: Marky Hagen MD;  Location: [facility];  Service: Orthopedics;;    EYE SURGERY Bilateral     eye implants    GANGLION CYST EXCISION  2007    Right wrist    INJECTION OF FACET JOINT Bilateral 2021    Procedure: INJECTION, FACET JOINT--Bilateral L4-5, L5-S1;  Surgeon: Alireza Meraz Jr., MD;  Location: [facility] PAIN T;  Service: Pain Management;  Laterality: Bilateral;  Oral    INTERPOSITION ARTHROPLASTY OF CARPOMETACARPAL JOINTS Left 2021    Procedure: INTERPOSITION ARTHROPLASTY, CMC JOINT - left dequervains and cmc arthroplasty, arthrex;  Surgeon: Marky Hagen MD;  Location: [facility];  Service: Orthopedics;   Laterality: Left;    JOINT REPLACEMENT Left     Pan Retinal Photocoagulation Bilateral     Dr. Monterroso (Proliferative Diabetic Retinopathy)    TOTAL ABDOMINAL HYSTERECTOMY  1982    TOTAL KNEE ARTHROPLASTY  2/2006    left    TRIGGER FINGER RELEASE  9/18/2009       Family History   Problem Relation Age of Onset    Diabetes Mother     Hypertension Mother     Heart disease Father     Diabetes Sister     Thyroid disease Brother     Diabetes Brother     Hypertension Brother     No Known Problems Daughter     No Known Problems Son     No Known Problems Daughter     Amblyopia Neg Hx     Blindness Neg Hx     Glaucoma Neg Hx     Macular degeneration Neg Hx     Retinal detachment Neg Hx     Strabismus Neg Hx     Colon cancer Neg Hx     Esophageal cancer Neg Hx        Social History     Socioeconomic History    Marital status:      Spouse name: Heber    Number of children: 3    Years of education: Not on file    Highest education level: Not on file   Occupational History    Occupation:    Tobacco Use    Smoking status: Never Smoker    Smokeless tobacco: Never Used   Substance and Sexual Activity    Alcohol use: No    Drug use: No    Sexual activity: Yes     Partners: Male   Other Topics Concern    Not on file   Social History Narrative    , lives with family; 3 children, 4 grandchildren     Social Determinants of Health     Financial Resource Strain:     Difficulty of Paying Living Expenses:    Food Insecurity:     Worried About Running Out of Food in the Last Year:     Ran Out of Food in the Last Year:    Transportation Needs:     Lack of Transportation (Medical):     Lack of Transportation (Non-Medical):    Physical Activity:     Days of Exercise per Week:     Minutes of Exercise per Session:    Stress: No Stress Concern Present    Feeling of Stress : Not at all   Social Connections:     Frequency of Communication with Friends and Family:     Frequency of Social Gatherings with Friends and Family:      Attends Anabaptism Services:     Active Member of Clubs or Organizations:     Attends Club or Organization Meetings:     Marital Status:        Review of patient's allergies indicates:   Allergen Reactions    Iodinated contrast media Rash       Physical Exam:  There were no vitals filed for this visit.  GEN: No acute distress. Calm, comfortable  HENT: Normocephalic, atraumatic, moist mucous membranes  EYE: Anicteric sclera, non-injected.   CV: Non-diaphoretic.   RESP: Breathing comfortably. Chest expansion symmetric.  PSYCH: Pleasant mood and appropriate affect. Recent and remote memory intact.       Imagin2021  X-ray Knee Ortho Right  CLINICAL HISTORY:  R KNEE; Pain in right knee  TECHNIQUE:  AP standing of both knees, Merchant views of both knees as well as a lateral view of the right knee were performed.  COMPARISON:  2018  FINDINGS:  No acute fracture seen.  No significant suprapatellar joint effusion.  Tricompartmental osteophyte formation with advanced narrowing of the medial and patellofemoral compartments.  Previous left knee arthroplasty with metallic components in good position.  Impression:  Osteoarthritis with advanced joint space narrowing.    Labs:  BMP  Lab Results   Component Value Date     2021    K 4.0 2021     2021    CO2 26 2021    BUN 30 (H) 2021    CREATININE 1.8 (H) 2021    CALCIUM 9.0 2021    ANIONGAP 8 2021    ESTGFRAFRICA 32.2 (A) 2021    EGFRNONAA 27.9 (A) 2021     Lab Results   Component Value Date    ALT 18 2021    AST 24 2021    ALKPHOS 122 2021    BILITOT 0.3 2021     Wt Readings from Last 5 Encounters:   21 (!) 142.9 kg (315 lb 0.6 oz)   21 95.3 kg (210 lb)   04/15/21 (!) 145.6 kg (320 lb 15.8 oz)   21 (!) 145.6 kg (321 lb)   21 (!) 146.5 kg (323 lb)       Assessment:  Problem List Items Addressed This Visit          Neuro    DDD (degenerative disc  disease), lumbar    Chronic pain       Orthopedic    Chronic back pain          Other Visit Diagnoses       Lumbar spondylosis    -  Primary    Arthropathy of facet joints at multiple levels        Primary osteoarthritis of both knees                4/28/21 - Annika Mac is a 70 y.o. female with diffuse chronic pain related to degenerative joint disease and degenerative spine disease complicated by morbid obesity and likely sedentary lifestyle as well.  Patient is currently receiving treatment for weight reduction by the Bariatric Medicine Department and follows up with Rheumatology, along with a multitude of other providers.  She received a bilateral lumbar facet injection for low back pain in 2014 which provided her 90% relief based on postoperative assessment.  I recommended repeating this injection.  While patient was referred for knee pain, she chose to focus on low back pain during today's encounter; however, we can treat her knee pain in the future if that is something she wants to discuss.  When she was last in this clinic in 2014 she received recommendations to lose weight and establish a daily home exercise regimen, both of which remain relevant today.    5/21/2021- Annika Mac is a 70 y.o. female who  has a past medical history of Asthma, Constipation (10/3/2019), Deep vein thrombophlebitis of left leg (7/27/2012), Deep vein thrombosis, Encounter for blood transfusion, GERD (gastroesophageal reflux disease), Obesity, diabetes, and hypertension syndrome (2/13/2019), Obstructive sleep apnea (7/27/2012), PAD (peripheral artery disease) (11/21/2013), Type 2 diabetes mellitus with obesity (8/19/2020), and Type 2 diabetes mellitus with peripheral artery disease (8/19/2020).  By history and examination this patient has chronic low back pain without  radiculopathy.  The underlying cause cause is facet arthritis, degenerative disc disease, muscle dysfunction and deconditioning.  Pathology is confirmed by  imaging.  We discussed the underlying diagnoses and multiple treatment options including non-opioid medications, injections, physical therapy, home exercise, activity modification / rest and weight loss.  The risks and benefits of each treatment option were discussed and all questions were answered.  I discussed with patient I will consult Dr. Meraz to see if he would like to move forward with scheduling the bilateral lumbar RFA, patient verbalized understanding and agreed.  Until that I recommended that she continue with physical therapy as well as stabbing home exercise plan that involves daily stretching and core strengthening.    03/28/20221962-69-dayo-old morbidly obese female with a history of chronic low back pain related to facet arthropathy, DDD complicated by morbid obesity and a sedentary lifestyle.  She has benefited from facet steroid injections targeting L4-5 and L5-S1, patient today on recommending a diagnostic bilateral lumbar MBB targeting L4-5 L5-S1 and considering her for a lumbar RFA following.  The patient was amenable to this plan also provide her with information explain the procedure even further.   Lastly, we did discuss considering her for a right knee GN block and RFA to help with chronic right knee pian, she is being considered for a right TKA with Dr. Ragland once she has los approximately 10 lbs.     04/11/[id number]-mkec-old female with a history of chronic low back pain related to facet arthropathy, DDD complicated by morbid obesity and sedentary lifestyle.  She is status post her 1st diagnostic lumbar MBB targeting L4-5 L5-S1 with 9% relief and improved functionality.  Discussed today that will now recommend a 2nd diagnostic lumbar MBB targeting the above-mentioned levels.  If appropriate we will schedule for lumbar RFA.    06/07/[id number]-xkjw-old female status post a lumbar RFA targeting L4-5 L5-S1 she did receive 50% relief of her low back pain.  She did have a new complaint today pain  in the left buttocks radiating down the posterior left leg, based on  history and exam her pain is likely related to piriformis syndrome, her pain is complicated by her morbid obesity and sedentary lifestyle.  I recommended establishing a HE plan that focuses on piriformis stretching exercises, I will also provided her some HE sheets.  Based on Previous lumbar MRI she does have disc bulges at L4-5 and L5-S1 couple with her facet arthropathy that results in mild spinal canal stenosis at L4-5.  At L5-S1 she has moderate left neural foraminal narrowing which could be the cause of her left leg pain however this is unclear at this point.  Discussed with her that if her pain does not get better over time with stretching that we can update her lumbar MRI and possibly consider a lumbar NAYLA based on the results.    11/14/2023 -  Annika Mac presents for follow up of her low back pain.  She reports >50% relief x 1 year following lumbar RFA targeting L4-5 L5-S1. She would like to repeat the procedure.  She also complains of right thumb pain.  I will order an x-ray and refer her to Ortho hand for possible interventions.  We discussed possible genicular nerve blocks/RFA in the future.     1/4/[id number]-mwgf-old female with a history of chronic low back pain she is reporting greater than 50% relief following her recent lumbar RFA targeting L3, 4 and L5.  Patient exam patient has chronic low back pain without radiculopathy.  We discussed underlying causes facet arthritis and degenerative disc disease she also has muscle dysfunction deconditioning.  Recommended aqua therapy today to help with axial low back pain and to lose weight.    : Reviewed and consistent with medication use as prescribed.    Treatment Plan:   Procedures:  none at this time.  Consider left piriformis injection in the future                     Consider lumbar NAYLA however will need a updated image prior to.               Consider a right knee GNB and RFA in  the future.   PT/OT/HEP:    - Consult to aqua therapy today    - daily cardio   - daily stretching   - focused HEP  Medications: Recommended tylenol 1000 mg TID PRN not to exceed 3000 mg in 24 hrs educated patient  Labs: reviewed and medications are appropriately dosed for current hepatorenal function.  Imaging:     Reviewed       Follow Up:  6-8 weeks or sooner if needed    ROSANGELA RayC  Interventional Pain Management      Disclaimer: This note was partly generated using dictation software which may occasionally result in transcription errors.

## 2024-01-08 ENCOUNTER — OFFICE VISIT (OUTPATIENT)
Dept: PODIATRY | Facility: CLINIC | Age: 74
End: 2024-01-08
Payer: MEDICARE

## 2024-01-08 VITALS
HEIGHT: 65 IN | WEIGHT: 291.69 LBS | BODY MASS INDEX: 48.6 KG/M2 | SYSTOLIC BLOOD PRESSURE: 122 MMHG | DIASTOLIC BLOOD PRESSURE: 59 MMHG | HEART RATE: 76 BPM

## 2024-01-08 DIAGNOSIS — E11.42 DIABETIC POLYNEUROPATHY ASSOCIATED WITH TYPE 2 DIABETES MELLITUS: Primary | ICD-10-CM

## 2024-01-08 DIAGNOSIS — B35.1 ONYCHOMYCOSIS DUE TO DERMATOPHYTE: ICD-10-CM

## 2024-01-08 PROCEDURE — 99499 UNLISTED E&M SERVICE: CPT | Mod: S$GLB,,, | Performed by: PODIATRIST

## 2024-01-08 PROCEDURE — 99999 PR PBB SHADOW E&M-EST. PATIENT-LVL V: CPT | Mod: PBBFAC,,, | Performed by: PODIATRIST

## 2024-01-08 PROCEDURE — 11721 DEBRIDE NAIL 6 OR MORE: CPT | Mod: Q9,S$GLB,, | Performed by: PODIATRIST

## 2024-01-08 NOTE — PROGRESS NOTES
Subjective:      Patient ID: Annika Mac is a 73 y.o. female.    Chief Complaint: Diabetes Mellitus (PCP - Mamie Cotton MD - 10/24/2023) and Nail Care      Annika is a 73 y.o. female who presents to the clinic for evaluation and treatment of high risk feet. Annika has a past medical history of Asthma, Chronic obstructive pulmonary disease, unspecified COPD type (1/14/2022), Constipation (10/3/2019), Deep vein thrombophlebitis of left leg (7/27/2012), Deep vein thrombosis, Diabetic foot ulcer with osteomyelitis, Encounter for blood transfusion, GERD (gastroesophageal reflux disease), Obesity, diabetes, and hypertension syndrome (2/13/2019), Obstructive sleep apnea (7/27/2012), PAD (peripheral artery disease) (11/21/2013), Pressure ulcer of toe of left foot, Type 2 diabetes mellitus with obesity (8/19/2020), and Type 2 diabetes mellitus with peripheral artery disease (8/19/2020). The patient's chief complaint is long, thick toenails. This patient has documented high risk feet requiring routine maintenance secondary to peripheral neuropathy.    PCP: Mamie Cotton MD    Date Last Seen by PCP:   Chief Complaint   Patient presents with    Diabetes Mellitus     PCP - Mamie Cotton MD - 10/24/2023    Nail Care         Current shoe gear:  Affected Foot: Rx diabetic extra depth shoes and custom accommodative insoles     Unaffected Foot: Rx diabetic extra depth shoes and custom accommodative insoles    Hemoglobin A1C   Date Value Ref Range Status   10/24/2023 7.5 (H) 4.0 - 5.6 % Final     Comment:     ADA Screening Guidelines:  5.7-6.4%  Consistent with prediabetes  >or=6.5%  Consistent with diabetes    High levels of fetal hemoglobin interfere with the HbA1C  assay. Heterozygous hemoglobin variants (HbS, HgC, etc)do  not significantly interfere with this assay.   However, presence of multiple variants may affect accuracy.     01/05/2023 7.7 (H) 4.0 - 5.6 % Final     Comment:     ADA  Screening Guidelines:  5.7-6.4%  Consistent with prediabetes  >or=6.5%  Consistent with diabetes    High levels of fetal hemoglobin interfere with the HbA1C  assay. Heterozygous hemoglobin variants (HbS, HgC, etc)do  not significantly interfere with this assay.   However, presence of multiple variants may affect accuracy.     08/24/2022 7.4 (H) 4.0 - 5.6 % Final     Comment:     ADA Screening Guidelines:  5.7-6.4%  Consistent with prediabetes  >or=6.5%  Consistent with diabetes    High levels of fetal hemoglobin interfere with the HbA1C  assay. Heterozygous hemoglobin variants (HbS, HgC, etc)do  not significantly interfere with this assay.   However, presence of multiple variants may affect accuracy.         Review of Systems   Constitutional: Negative for chills, fever and malaise/fatigue.   HENT:  Negative for hearing loss.    Cardiovascular:  Positive for leg swelling. Negative for claudication.   Respiratory:  Negative for shortness of breath.    Skin:  Positive for color change, dry skin, nail changes and unusual hair distribution. Negative for flushing and rash.   Musculoskeletal:  Negative for joint pain and myalgias.   Neurological:  Positive for numbness, paresthesias and sensory change. Negative for loss of balance.   Psychiatric/Behavioral:  Negative for altered mental status.            Objective:      Physical Exam  Vitals reviewed.   Constitutional:       Appearance: She is well-developed.   Cardiovascular:      Pulses:           Dorsalis pedis pulses are 0 on the right side and 0 on the left side.        Posterior tibial pulses are 1+ on the right side and 1+ on the left side.   Musculoskeletal:      Right lower leg: Edema present.      Left lower leg: Edema present.      Right ankle: Decreased range of motion. Abnormal pulse.      Right Achilles Tendon: Patel's test negative.      Left ankle: Decreased range of motion. Abnormal pulse.      Left Achilles Tendon: Patel's test negative.      Right  foot: Decreased range of motion.      Left foot: Decreased range of motion.   Feet:      Right foot:      Protective Sensation: 5 sites tested.  2 sites sensed.      Left foot:      Protective Sensation: 5 sites tested.  2 sites sensed.   Skin:     General: Skin is warm and dry.      Capillary Refill: Capillary refill takes more than 3 seconds.      Comments: Nails x10 are elongated by 4-6mm's, thickened by 2-3 mm's, dystrophic, and are yellowish in  coloration . Xerosis Bilaterally. No open lesions noted.    Neurological:      Mental Status: She is alert.      Comments: diminished sensation noted to b/L lower extremities   Psychiatric:         Behavior: Behavior normal. Behavior is cooperative.               Assessment:       Encounter Diagnoses   Name Primary?    Diabetic polyneuropathy associated with type 2 diabetes mellitus Yes    Onychomycosis due to dermatophyte          Plan:       Annika was seen today for diabetes mellitus and nail care.    Diagnoses and all orders for this visit:    Diabetic polyneuropathy associated with type 2 diabetes mellitus    Onychomycosis due to dermatophyte      I counseled the patient on her conditions, their implications and medical management.    Pt advised to wear compression stockings for lower extremity swelling.     - Shoe inspection. Diabetic Foot Education. Patient reminded of the importance of good nutrition and blood sugar control to help prevent podiatric complications of diabetes. Patient instructed on proper foot hygeine. We discussed wearing proper shoe gear, daily foot inspections, never walking without protective shoe gear, never putting sharp instruments to feet, routine podiatric nail visits every 2-3 months.      - With patient's permission, nails were aggressively reduced and debrided x 10 to their soft tissue attachment mechanically and with electric , removing all offending nail and debris. Patient relates relief following the procedure. She will  continue to monitor the areas daily, inspect her feet, wear protective shoe gear when ambulatory, moisturizer to maintain skin integrity and follow in this office in approximately 2-3 months, sooner p.r.n.

## 2024-01-13 DIAGNOSIS — E11.42 TYPE 2 DIABETES MELLITUS WITH DIABETIC POLYNEUROPATHY, WITH LONG-TERM CURRENT USE OF INSULIN: ICD-10-CM

## 2024-01-13 DIAGNOSIS — Z79.4 TYPE 2 DIABETES MELLITUS WITH DIABETIC POLYNEUROPATHY, WITH LONG-TERM CURRENT USE OF INSULIN: ICD-10-CM

## 2024-01-16 RX ORDER — PREGABALIN 150 MG/1
CAPSULE ORAL
Qty: 60 CAPSULE | Refills: 2 | Status: SHIPPED | OUTPATIENT
Start: 2024-01-16 | End: 2024-03-05 | Stop reason: SDUPTHER

## 2024-01-18 ENCOUNTER — TELEPHONE (OUTPATIENT)
Dept: ORTHOPEDICS | Facility: CLINIC | Age: 74
End: 2024-01-18
Payer: MEDICARE

## 2024-01-18 DIAGNOSIS — M79.641 RIGHT HAND PAIN: Primary | ICD-10-CM

## 2024-01-19 ENCOUNTER — OFFICE VISIT (OUTPATIENT)
Dept: ORTHOPEDICS | Facility: CLINIC | Age: 74
End: 2024-01-19
Payer: MEDICARE

## 2024-01-19 ENCOUNTER — HOSPITAL ENCOUNTER (OUTPATIENT)
Dept: RADIOLOGY | Facility: HOSPITAL | Age: 74
Discharge: HOME | End: 2024-01-19
Attending: PHYSICIAN ASSISTANT
Payer: MEDICARE

## 2024-01-19 DIAGNOSIS — M65.4 DE QUERVAIN'S TENOSYNOVITIS, RIGHT: Primary | ICD-10-CM

## 2024-01-19 DIAGNOSIS — M79.641 RIGHT HAND PAIN: ICD-10-CM

## 2024-01-19 DIAGNOSIS — M79.644 PAIN OF RIGHT THUMB: ICD-10-CM

## 2024-01-19 PROCEDURE — 73130 X-RAY EXAM OF HAND: CPT | Mod: TC,RT

## 2024-01-19 PROCEDURE — 20550 NJX 1 TENDON SHEATH/LIGAMENT: CPT | Mod: RT,S$GLB,, | Performed by: PHYSICIAN ASSISTANT

## 2024-01-19 PROCEDURE — 99213 OFFICE O/P EST LOW 20 MIN: CPT | Mod: 25,S$GLB,, | Performed by: PHYSICIAN ASSISTANT

## 2024-01-19 PROCEDURE — 73130 X-RAY EXAM OF HAND: CPT | Mod: 26,RT,, | Performed by: RADIOLOGY

## 2024-01-19 PROCEDURE — 99999 PR PBB SHADOW E&M-EST. PATIENT-LVL IV: CPT | Mod: PBBFAC,,, | Performed by: PHYSICIAN ASSISTANT

## 2024-01-19 RX ORDER — TRIAMCINOLONE ACETONIDE 40 MG/ML
20 INJECTION, SUSPENSION INTRA-ARTICULAR; INTRAMUSCULAR
Status: DISCONTINUED | OUTPATIENT
Start: 2024-01-19 | End: 2024-01-19 | Stop reason: HOSPADM

## 2024-01-19 RX ADMIN — TRIAMCINOLONE ACETONIDE 20 MG: 40 INJECTION, SUSPENSION INTRA-ARTICULAR; INTRAMUSCULAR at 03:01

## 2024-01-19 NOTE — PROCEDURES
Right DeQuervains (1st injection)    Date/Time: 1/19/2024 3:30 PM    Performed by: Russo-Digeorge, Jamie L., PA-C  Authorized by: Russo-Digeorge, Jamie L., PA-C    Consent Done?:  Yes (Verbal)  Indications:  Pain  Site marked: the procedure site was marked    Timeout: prior to procedure the correct patient, procedure, and site was verified    Prep: patient was prepped and draped in usual sterile fashion      Local anesthesia used?: Yes    Anesthesia method: Topical cold spray used prior to the injection and 1cc 1% plain lidocaine contained in the injectate.  Local anesthetic:  Lidocaine 1% without epinephrine  Location:  Wrist  Site:  R first doral compartment  Ultrasonic guidance for needle placement?: No    Needle size:  25 G  Approach:  Radial  Medications:  20 mg triamcinolone acetonide 40 mg/mL  Patient tolerance:  Patient tolerated the procedure well with no immediate complications

## 2024-01-19 NOTE — PROGRESS NOTES
[facility]  History & Physical  Orthopedics    SUBJECTIVE:      Chief Complaint: right thumb/wrist pain    Referring Provider: Alejandrina Roa DO Dr. Dunbar is the supervising physician for this encounter/patient    History of Present Illness:  Patient is a 73 y.o. right hand dominant female who presents today with complaints of right thumb/wrist pain present for several months, no trauma/injury. Pain to radial wrist, worse with gripping/lifting or thumb/wrist motion. History of Left CMC arthroplasty and first dorsal compartment release with Dr. Hagen in 2021, this pain feels similar to the left sided pain back then. Also history of Right long trigger finger release in 2014.     Onset of symptoms/DOI was months.    Symptoms are aggravated by activity and movement.    Symptoms are alleviated by rest.    Symptoms consist of pain.    The patient rates their pain as a 4/10.    Attempted treatment(s) and/or interventions include activity modifications, rest.     The patient denies any fevers, chills, N/V, D/C and presents for evaluation.       Past Medical History:   Diagnosis Date    Asthma     Chronic obstructive pulmonary disease, unspecified COPD type 1/14/2022    Constipation 10/3/2019    Deep vein thrombophlebitis of left leg 7/27/2012    Deep vein thrombosis     when she had a knee replacement    Diabetic foot ulcer with osteomyelitis     Encounter for blood transfusion     anemic     GERD (gastroesophageal reflux disease)     Obesity, diabetes, and hypertension syndrome 2/13/2019    Obstructive sleep apnea 7/27/2012    PAD (peripheral artery disease) 11/21/2013    Pressure ulcer of toe of left foot     Type 2 diabetes mellitus with obesity 8/19/2020    Type 2 diabetes mellitus with peripheral artery disease 8/19/2020     Past Surgical History:   Procedure Laterality Date    CATARACT EXTRACTION W/  INTRAOCULAR LENS IMPLANT Left 3/2002    left     CATARACT EXTRACTION W/   INTRAOCULAR LENS IMPLANT Right     OD dr. olivares     SECTION      COLONOSCOPY N/A 2018    Procedure: COLONOSCOPY;  Surgeon: Faizan Mac MD;  Location: [facility] (2ND FLR);  Service: Endoscopy;  Laterality: N/A;    COLONOSCOPY N/A 2023    Procedure: COLONOSCOPY;  Surgeon: Pa Zamora MD;  Location: [facility] ENDO (2ND FLR);  Service: Endoscopy;  Laterality: N/A;    CYST REMOVAL  2011    left side of face    CYSTOSCOPY W/ RETROGRADES Left 2020    Procedure: CYSTOSCOPY, WITH RETROGRADE PYELOGRAM;  Surgeon: Noman Rivas MD;  Location: [facility] OR 1ST FLR;  Service: Urology;  Laterality: Left;  30min    DE QUERVAIN'S RELEASE  2021    Procedure: RELEASE, HAND, FOR DEQUERVAIN'S TENOSYNOVITIS;  Surgeon: Marky Hagen MD;  Location: [facility] OR;  Service: Orthopedics;;    ESOPHAGOGASTRODUODENOSCOPY N/A 2023    Procedure: EGD (ESOPHAGOGASTRODUODENOSCOPY);  Surgeon: Pa Zamora MD;  Location: [facility] (2ND FLR);  Service: Endoscopy;  Laterality: N/A;  suprep-[facility] mail-bmi 49.09-tb  23- No answer for precall- [place]    EYE SURGERY Bilateral     eye implants    GANGLION CYST EXCISION  2007    Right wrist    INJECTION OF ANESTHETIC AGENT AROUND MEDIAL BRANCH NERVES INNERVATING LUMBAR FACET JOINT Bilateral 2022    Procedure: Block-nerve-medial branch-lumbar bilateral L4-5 and L5-S1;  Surgeon: Alireza Meraz Jr., MD;  Location: [facility] PAIN MGT;  Service: Pain Management;  Laterality: Bilateral;  pt is diabetic   asa    INJECTION OF ANESTHETIC AGENT AROUND MEDIAL BRANCH NERVES INNERVATING LUMBAR FACET JOINT Bilateral 2022    Procedure: Block-nerve-medial branch-lumbar bilaterl L4-5 and L5-S1;  Surgeon: Alireza Meraz Jr., MD;  Location: [facility] PAIN MGT;  Service: Pain Management;  Laterality: Bilateral;  pt is diabetic     INJECTION OF FACET JOINT Bilateral 2021    Procedure: INJECTION, FACET JOINT--Bilateral L4-5, L5-S1;  Surgeon: Alireza Meraz  MD Gio;  Location: [facility] PAIN MGT;  Service: Pain Management;  Laterality: Bilateral;  Oral    INTERPOSITION ARTHROPLASTY OF CARPOMETACARPAL JOINTS Left 1/8/2021    Procedure: INTERPOSITION ARTHROPLASTY, CMC JOINT - left dequervains and cmc arthroplasty, arthrex;  Surgeon: Marky Hagen MD;  Location: [facility] OR;  Service: Orthopedics;  Laterality: Left;    JOINT REPLACEMENT Left     Pan Retinal Photocoagulation Bilateral     Dr. Monterroso (Proliferative Diabetic Retinopathy)    RADIOFREQUENCY ABLATION OF LUMBAR MEDIAL BRANCH NERVE AT SINGLE LEVEL Bilateral 12/20/2023    Procedure: Radiofrequency Ablation, Nerve, Spinal, Lumbar, bilateral L4/5 L5/S1;  Surgeon: Alejandrina Roa DO;  Location: [facility] PAIN MANAGEMENT;  Service: Pain Management;  Laterality: Bilateral;  diabetic  asa    RADIOFREQUENCY THERMOCOAGULATION Right 5/12/2022    Procedure: RADIOFREQUENCY THERMAL COAGULATION RIGHT L4-5, L5-S1 (IV Sedation);  Surgeon: Alireza Meraz Jr., MD;  Location: [facility] PAIN MGT;  Service: Pain Management;  Laterality: Right;  diabetic    RADIOFREQUENCY THERMOCOAGULATION Left 5/19/2022    Procedure: RADIOFREQUENCY THERMAL COAGULATION LEFT L4-5, L5-S1 (IV Sedation);  Surgeon: Alireza Meraz Jr., MD;  Location: [facility] PAIN MGT;  Service: Pain Management;  Laterality: Left;  diabetic    TOTAL ABDOMINAL HYSTERECTOMY  1982    TOTAL KNEE ARTHROPLASTY  2/2006    left    TRIGGER FINGER RELEASE  9/18/2009     Review of patient's allergies indicates:   Allergen Reactions    Clindamycin Hives and Swelling    Iodinated contrast media Rash     Social History     Social History Narrative    , lives with family; 3 children, 4 grandchildren     Family History   Problem Relation Age of Onset    Diabetes Mother     Hypertension Mother     Heart disease Father     Diabetes Sister     No Known Problems Sister     No Known Problems Brother     Thyroid disease Brother     Diabetes Brother     Hypertension Brother     No Known Problems  "Daughter     No Known Problems Daughter     No Known Problems Son     Amblyopia Neg Hx     Blindness Neg Hx     Glaucoma Neg Hx     Macular degeneration Neg Hx     Retinal detachment Neg Hx     Strabismus Neg Hx     Colon cancer Neg Hx     Esophageal cancer Neg Hx          Current Outpatient Medications:     albuterol (PROAIR HFA) 90 mcg/actuation inhaler, Inhale 2 puffs into the lungs every 6 (six) hours as needed for Wheezing. Rescue, Disp: 18 g, Rfl: 6    allopurinoL (ZYLOPRIM) 300 MG tablet, , Disp: , Rfl:     amLODIPine (NORVASC) 10 MG tablet, TAKE 1 TABLET BY MOUTH ONCE  DAILY, Disp: 90 tablet, Rfl: 3    aspirin 81 MG Chew, Take 1 tablet (81 mg total) by mouth once daily., Disp: , Rfl: 0    atorvastatin (LIPITOR) 80 MG tablet, Take 1 tablet (80 mg total) by mouth every evening., Disp: 30 tablet, Rfl: 11    BD ULTRA-FINE KIKA PEN NEEDLE 32 gauge x 5/32" Ndle, USE 4 TIMES DAILY, Disp: 200 each, Rfl: 20    blood sugar diagnostic Strp, 1 strip 4 times daily. Contour Next., Disp: 200 strip, Rfl: 11    blood-glucose meter kit, 1 each by Other route once daily. Use daily. Insurance proffered one touch  E11.42, Disp: 1 each, Rfl: 0    blood-glucose sensor (DEXCOM G7 SENSOR MISC), by Misc.(Non-Drug; Combo Route) route., Disp: , Rfl:     blood-glucose transmitter (DEXCOM G6 TRANSMITTER) Terese, 1 Device by Misc.(Non-Drug; Combo Route) route every 3 (three) months., Disp: 1 each, Rfl: 3    chlorthalidone (HYGROTEN) 25 MG Tab, Take 1 tablet by mouth once daily, Disp: 30 tablet, Rfl: 0    CONSTULOSE 10 gram/15 mL solution, SMARTSI Milliliter(s) By Mouth Twice Daily PRN, Disp: , Rfl:     DEXCOM G7  Misc, Use as directed, Disp: 1 each, Rfl: 0    DEXCOM G7 SENSOR Terese, 1 Device by Misc.(Non-Drug; Combo Route) route every 10 days., Disp: 3 each, Rfl: 11    dulaglutide (TRULICITY) 4.5 mg/0.5 mL pen injector, INJECT THE CONTENTS OF ONE PEN  SUBCUTANEOUSLY WEEKLY AS  DIRECTED, Disp: 12 pen , Rfl: 3    DULoxetine " (CYMBALTA) 30 MG capsule, Take 2 capsules (60 mg total) by mouth once daily., Disp: 180 capsule, Rfl: 3    famotidine (PEPCID) 20 MG tablet, Take 1 tablet (20 mg total) by mouth 2 (two) times daily., Disp: 1 tablet, Rfl: 0    finasteride (PROSCAR) 5 mg tablet, Take 1 tablet (5 mg total) by mouth once daily., Disp: 30 tablet, Rfl: 11    glucagon (GVOKE HYPOPEN 1-PACK) 0.5 mg/0.1 mL AtIn, Inject 1 Dose into the skin daily as needed (for severe hypoglycemia if you aren't able to treat by ingesting sugar)., Disp: 1 Syringe, Rfl: 3    insulin lispro (HUMALOG KWIKPEN INSULIN) 100 unit/mL pen, INJECT 13 UNITS INTO THE  SKIN 3 TIMES DAILY BEFORE  MEALS PLUS SLIDING SCALE -  MAX TOTAL DAILY DOSE 70  UNITS, Disp: 30 mL, Rfl: 6    irbesartan (AVAPRO) 300 MG tablet, TAKE 1 TABLET BY MOUTH IN THE  EVENING, Disp: 90 tablet, Rfl: 3    JARDIANCE 10 mg tablet, TAKE 1 TABLET BY MOUTH ONCE  DAILY, Disp: 90 tablet, Rfl: 3    ketoconazole (NIZORAL) 2 % shampoo, Wash hair with medicated shampoo at least 2x/week - let sit on scalp at least 5 minutes prior to rinsing, Disp: 120 mL, Rfl: 5    labetaloL (NORMODYNE) 300 MG tablet, Take 1 tablet (300 mg total) by mouth 2 (two) times daily., Disp: 180 tablet, Rfl: 3    lancets 31 gauge Misc, 1 lancet by Misc.(Non-Drug; Combo Route) route 4 (four) times daily. E11.42 . Prescribed one touch, Disp: 200 each, Rfl: 6    multivitamin (THERAGRAN) per tablet, Take 1 tablet by mouth once daily., Disp: , Rfl:     pregabalin (LYRICA) 150 MG capsule, TAKE 1 CAPSULE BY MOUTH TWICE  DAILY, Disp: 60 capsule, Rfl: 2    sodium bicarbonate 325 MG tablet, Take 2 tablets (650 mg total) by mouth 2 (two) times daily., Disp: 120 tablet, Rfl: 11    TRESIBA FLEXTOUCH U-100 100 unit/mL (3 mL) insulin pen, INJECT 40 UNITS INTO THE SKIN  ONCE DAILY, Disp: 15 mL, Rfl: 8    Current Facility-Administered Medications:     triamcinolone acetonide injection 10 mg, 10 mg, Intradermal, Once, Susie Curran MD     triamcinolone acetonide injection 10 mg, 10 mg, Intradermal, Once, Susie Curran MD      Review of Systems:  Constitutional: no fever or chills  Eyes: no visual changes  ENT: no nasal congestion or sore throat  Respiratory: no cough or shortness of breath  Cardiovascular: no chest pain  Gastrointestinal: no nausea or vomiting, tolerating diet  Musculoskeletal: pain and soreness    OBJECTIVE:      Vital Signs (Most Recent):  There were no vitals filed for this visit.  There is no height or weight on file to calculate BMI.      Physical Exam:  Constitutional: The patient appears well-developed and well-nourished. No distress.   Skin: No lesions appreciated  Head: Normocephalic and atraumatic.   Nose: Nose normal.   Ears: No deformities seen  Eyes: Conjunctivae and EOM are normal.   Neck: No tracheal deviation present.   Cardiovascular: Normal rate and intact distal pulses.    Pulmonary/Chest: Effort normal. No respiratory distress.   Abdominal: There is no guarding.   Neurological: The patient is alert.   Psychiatric: The patient has a normal mood and affect.     Right Hand/Wrist Examination:    Observation/Inspection:  Swelling  none    Deformity  none  Discoloration  none     Scars   none    Atrophy  none    HAND/WRIST EXAMINATION:  Finkelstein's Test   POS  WHAT Test    POS  Snuff box tenderness   Neg  Juarez's Test    Neg  Hook of Hamate Tenderness  Neg  CMC grind    Pos  Circumduction test   Neg  Acutely TTP over the radial styloid    Neurovascular Exam:  Digits WWP, brisk CR < 3s throughout  NVI motor/LTS to M/R/U nerves, radial pulse 2+  Tinel's Test - Carpal Tunnel  Neg  Tinel's Test - Cubital Tunnel  Neg  Phalen's Test    Neg  Median Nerve Compression Test Neg    ROM hand full, pain with resisted thumb extension to radial wrist.    ROM wrist full, painless    ROM elbow full, painless    Abdomen not guarded  Respirations nonlabored  Perfusion intact    Diagnostic Results:     Imaging - I independently  viewed the patient's imaging as well as the radiology report.  Xrays of the patient's right wrist  demonstrate no evidence of any acute fractures or dislocations or significant degenerative changes.    EMG - none    ASSESSMENT/PLAN:      73 y.o. yo female with Right wrist DeQuervains tendonitis    Plan: The patient and I had a thorough discussion today.  We discussed the working diagnosis as well as several other potential alternative diagnoses.  Treatment options were discussed, both conservative and surgical.  Conservative treatment options would include things such as activity modifications, workplace modifications, a period of rest, oral vs topical OTC and prescription anti-inflammatory medications, occupational therapy, splinting/bracing, immobilization, corticosteroid injections, and others.  Surgical options were discussed as well.     At this time, the patient would like to proceed with a trial of Right wrist DeQuervains steroid injection, performed today without issues. Thumb support brace given. Discussed voltaren gel massage and ice/heat. RTC on prn basis.    Should the patient's symptoms worsen, persist, or fail to improve they should return for reevaluation and I would be happy to see them back anytime.           Please do not hesitate to reach out to us via email, phone, or MyChart with any questions, concerns, or feedback.

## 2024-01-25 ENCOUNTER — CLINICAL SUPPORT (OUTPATIENT)
Dept: REHABILITATION | Facility: HOSPITAL | Age: 74
End: 2024-01-25
Payer: MEDICARE

## 2024-01-25 DIAGNOSIS — R26.89 BALANCE PROBLEM: ICD-10-CM

## 2024-01-25 DIAGNOSIS — Z74.09 DECREASED FUNCTIONAL MOBILITY AND ENDURANCE: Primary | ICD-10-CM

## 2024-01-25 DIAGNOSIS — M54.16 LUMBAR RADICULOPATHY: ICD-10-CM

## 2024-01-25 DIAGNOSIS — M54.50 DORSALGIA OF LUMBAR REGION: ICD-10-CM

## 2024-01-25 DIAGNOSIS — M51.36 DDD (DEGENERATIVE DISC DISEASE), LUMBAR: ICD-10-CM

## 2024-01-25 DIAGNOSIS — R53.1 DECREASED STRENGTH: ICD-10-CM

## 2024-01-25 DIAGNOSIS — R26.9 GAIT ABNORMALITY: ICD-10-CM

## 2024-01-25 DIAGNOSIS — R52 PAIN: ICD-10-CM

## 2024-01-25 PROCEDURE — 97110 THERAPEUTIC EXERCISES: CPT

## 2024-01-25 PROCEDURE — 97162 PT EVAL MOD COMPLEX 30 MIN: CPT

## 2024-01-25 NOTE — PLAN OF CARE
[facility]   Physical Therapy Initial Evaluation      Date: 1/25/2024   Name: Annika Mac  Clinic Number: [id number]    Therapy Diagnosis:    Encounter Diagnoses   Name Primary?    Lumbar radiculopathy     DDD (degenerative disc disease), lumbar     Dorsalgia of lumbar region     Decreased functional mobility and endurance Yes    Gait abnormality     Balance problem     Decreased strength     Pain       Physician: Evangelist Hamilton FNP     Physician Orders: PT Eval and Treat  Medical Diagnosis from Referral: Lumbar radiculopathy; DDD (degenerative disc disease), lumbar; Dorsalgia of lumbar region  Evaluation Date: 1/25/2024  Authorization Period Expiration: 2/5/24  Plan of Care Expiration: 3/25/2024  Progress Note Due: 2/25/2024  Visit # / Visits authorized: 1/1  FOTO: 1/3 (last performed on 1/25/2024)    Precautions: Standard, Diabetes, Fall, and PAD, hx of DVT, COPD, asthma, anterolisthesis     Time In: 10:00 am  Time Out: 11:00 am  Total Billable Time (timed & untimed codes): 60 minutes    SUBJECTIVE   Date of onset:      History of current condition - Annika reports long history of LB pain, has had pain for over 4 years. She has had a lot of therapy for her back and she sits and bends forward, not doing other home exercise's from last therapy treatment. Encouraged patient to do exercises to strengthen as well as stretch.  Main problem standing/walking - wants to work on this.  Standing ~ about 10 min, walking about ~150 feet.   Uses rollator outside of home and for long distances, has walking cane for inside.    Current Activity Level: Household activities, can cook - but has to take rest break. Sitting to mop - mopping, sweeping or vacuuming increase LBP.    Falls: [x] No  [] Yes:     Imaging: [x] Xray [] MRI [] CT:   Impression: 2022 Degenerative change lower lumbar spine.  Grade 1 anterolisthesis L4-5.    Prior Therapy:  [] No  [x] Yes: recently stopped therapy in [facility]  History: Patient lives with their spouse  Stairs: [] No  [x] Yes: but does not go upstairs  Occupation: Patient is retired  Prior Level of Function: same about a year ago  Current Level of Function:  Standing ~ about 10 min, walking about ~150 feet.     Pain:  Current 5/10, worst 5/10, best 5/10   Location: bilateral back  and down legs to middle of calf  Description: Aching, Dull, Throbbing, Tight, and Electric  Aggravating Factors: walking or standing  Easing Factors: heating pad and forward      Patients goals: to walk without any assistive device.    Medical History:   Past Medical History:   Diagnosis Date    Asthma     Chronic obstructive pulmonary disease, unspecified COPD type 2022    Constipation 10/3/2019    Deep vein thrombophlebitis of left leg 2012    Deep vein thrombosis     when she had a knee replacement    Diabetic foot ulcer with osteomyelitis     Encounter for blood transfusion     anemic     GERD (gastroesophageal reflux disease)     Obesity, diabetes, and hypertension syndrome 2019    Obstructive sleep apnea 2012    PAD (peripheral artery disease) 2013    Pressure ulcer of toe of left foot     Type 2 diabetes mellitus with obesity 2020    Type 2 diabetes mellitus with peripheral artery disease 2020       Surgical History:   Annika Mac  has a past surgical history that includes Total knee arthroplasty (2006); Total abdominal hysterectomy (); Ganglion cyst excision (2007); Cyst Removal (2011); Trigger finger release (2009);  section; Cataract extraction w/  intraocular lens implant (Left, 3/2002); Cataract extraction w/  intraocular lens implant (Right, ); Pan Retinal Photocoagulation (Bilateral); Eye surgery (Bilateral, ); Colonoscopy (N/A, 2018); Cystoscopy w/ retrogrades (Left, 2020); Joint replacement (Left); Interposition arthroplasty of carpometacarpal joints (Left, 2021); De Quervain's release  (1/8/2021); Injection of facet joint (Bilateral, 5/6/2021); Injection of anesthetic agent around medial branch nerves innervating lumbar facet joint (Bilateral, 4/5/2022); Injection of anesthetic agent around medial branch nerves innervating lumbar facet joint (Bilateral, 4/19/2022); Radiofrequency thermocoagulation (Right, 5/12/2022); Radiofrequency thermocoagulation (Left, 5/19/2022); Esophagogastroduodenoscopy (N/A, 4/26/2023); Colonoscopy (N/A, 4/26/2023); and Radiofrequency ablation of lumbar medial branch nerve at single level (Bilateral, 12/20/2023).    Medications:   [facility] has a current medication list which includes the following prescription(s): albuterol, allopurinol, amlodipine, aspirin, atorvastatin, bd ultra-fine myesha pen needle, blood sugar diagnostic, blood-glucose meter, blood-glucose sensor, dexcom g6 transmitter, chlorthalidone, constulose, dexcom g7 , dexcom g7 sensor, trulicity, duloxetine, famotidine, finasteride, gvoke hypopen 1-pack, insulin lispro, irbesartan, jardiance, ketoconazole, labetalol, lancets, multivitamin, pregabalin, sodium bicarbonate, tresiba flextouch u-100, and [DISCONTINUED] insulin glargine (toujeo), and the following Facility-Administered Medications: triamcinolone acetonide and triamcinolone acetonide.    Allergies:   Review of patient's allergies indicates:   Allergen Reactions    Clindamycin Hives and Swelling    Iodinated contrast media Rash        OBJECTIVE     Posture:  Patient presents with postural abnormalities which include:    [x] Forward Head   [x] Increased Lumbar Lordosis   [x] Rounded Shoulder   [] Flat Back Posture   [] Increased Thoracic Kyphosis [] Pes Planus   [] Increased Trunk Sway  [] Valgus knee position   [] Increased Trunk Rotation  [] Varus knee position   [] Increased cervical lordosis [] Other:    Sensation:    Sensation to light touch over UE's is  [] Intact [] Impaired [x] Defer  Sensation to light touch over LE's is  [x] Intact []  Impaired [] Defer    Reflexes:  Patellar   [x] Defer [] Normal [] Hyper []  Hypo   Achilles  [x] Defer [] Normal [] Hyper []  Hypo  Tricep  [x] Defer [] Normal [] Hyper []  Hypo  Brachioradialis[x] Defer [] Normal [] Hyper []  Hypo      Gait Analysis: The patient ambulated with the following assistive device: rolling walker; the patient presents with the following gait abnormalities: unsteady gait, decreased step length, flexed posturing, and trunk shift with hip drop bilaterally     Balance  Right   (seconds) Left  (seconds) Norms   Single Leg Stance 4 5 Less than 4.9 sec high risk  5-6.4 sec increased risk  6.5 or greater low risk       Functional Tests  Outcome Norms   Five Time Sit to Stand 22.65 sec 60s: <11.4 sec  70s: <12.6 sec  80s: 14.8 sec  Greater than 14 sec high risk  12.1-14 sec increased risk  12 sec or less low risk        Range of Motion:      AROM:  Motion: Movement Results:   Multi-Segmental Flexion mid knee   Multi-Segmental Extension Defer due due anterolisthesis   Multi-Segmental Rotation 30%   Arms Down Deep Squat defer       Strength:    L/E MMT Right  (spine) Left Pain/Dysfunction with Movement   Hip Flexion  3-/5 3-/5    Knee Extension 4/5 4/5    Knee Flexion 4/5 4/5    Ankle DF 4+/5 4+/5    Ankle PF 4+/5 4+/5        Muscle Length:   Defer     Joint Mobility:   Defer     Special Tests:  Defer     Palpation:  Defer       FOTO:    Intake Outcome Measure for FOTO LB Survey    Therapist reviewed FOTO scores for Annika Mac on 1/25/2024.   FOTO documents entered into [employer] - see Media section or FOTO account episode details..    Intake Score: see below           TREATMENT     Total Treatment time (time-based codes) separate from Evaluation: (8) minutes     Annika received the treatments listed below:   Annika received therapeutic exercises to develop strength, endurance, ROM, and core stabilization for 8 minutes including:    Intervention 1/25/2024  Parameters   Move every 30 minutes x    HEP x     Pools in community x                                                   Plan for Next Visit: Initiate aquatic therapy         PATIENT EDUCATION AND HOME EXERCISES     Education provided: (during treatment session) minutes  Home exercise program, and importance of strength and regular movemnt throughout the day to increase general health and endurance.    Written Home Exercises Provided: yes.  Exercises were reviewed and Annika was able to demonstrate them prior to the end of the session.  Annika demonstrated good  understanding of the education provided. See EMR under Patient Instructions for exercises provided during therapy sessions.    ASSESSMENT     Annika is a 73 y.o. female referred to outpatient Physical Therapy with a medical diagnosis of  Lumbar radiculopathy; DDD (degenerative disc disease), lumbar; Dorsalgia of lumbar region . The patient presents with signs and symptoms consistent with diagnosis along with impairments which include weakness, impaired endurance, impaired functional mobility, gait instability, impaired balance, decreased lower extremity function, pain, and decreased ROM.      Patient prognosis is Fair, if patient is consistent and compliant with Physical Therapy and home exercise program.  Patient will benefit from skilled outpatient Physical Therapy to address the deficits stated above and in the chart below, provide patient/family education, and to maximize patient's level of independence.     Plan of care discussed with patient: Yes  Patient's spiritual, cultural and educational needs considered and patient is agreeable to the plan of care and goals as stated below:     Anticipated Barriers for therapy: co-morbidities, sedentary lifestyle, chronicity of condition, lack of understanding of condition, adherence to treatment plan, transportation, coping style, and extensive prior therapy      Medical Necessity is demonstrated by the following   History  Co-morbidities and personal factors that  may impact the plan of care [] LOW: no personal factors / co-morbidities  [] MODERATE: 1-2 personal factors / co-morbidities  [x] HIGH: 3+ personal factors / co-morbidities    Moderate / High Support Documentation:   Past Medical History:   Diagnosis Date    Asthma     Chronic obstructive pulmonary disease, unspecified COPD type 1/14/2022    Constipation 10/3/2019    Deep vein thrombophlebitis of left leg 7/27/2012    Deep vein thrombosis     when she had a knee replacement    Diabetic foot ulcer with osteomyelitis     Encounter for blood transfusion     anemic     GERD (gastroesophageal reflux disease)     Obesity, diabetes, and hypertension syndrome 2/13/2019    Obstructive sleep apnea 7/27/2012    PAD (peripheral artery disease) 11/21/2013    Pressure ulcer of toe of left foot     Type 2 diabetes mellitus with obesity 8/19/2020    Type 2 diabetes mellitus with peripheral artery disease 8/19/2020         Examination  Body Structures and Functions, activity limitations and participation restrictions that may impact the plan of care [] LOW: addressing 1-2 elements  [] MODERATE: 3+ elements  [x] HIGH: 4+ elements (please support below)    Moderate / High Support Documentation: See evaluation / objective measurements above and FOTO.     Clinical Presentation [] LOW: stable  [x] MODERATE: Evolving  [] HIGH: Unstable     Decision Making/ Complexity Score: moderate         Goals:  Reviewed: 1/25/2024      Short Term Goals: In 4 weeks  Progress Date   1.Patient to be educated on HEP. [x] Progressing  [] MET   [] Not MET   [] Not tested    2.Patient to increase trunk range of motion, in order to improve available range of motion for ADL's.  [x] Progressing  [] MET   [] Not MET  [] Not tested    3.Patient to increase bilateral LE strength by 1/2 grade, in order to improve endurance and increase ability to perform all functional activities for increased time.  [] Progressing  [] MET   [] Not MET  [] Not tested    4.Patient  to have pain less than 2/10 at worst, to improve QOL. [x] Progressing  [] MET   [] Not MET  [] Not tested    5.Patient to improve score on the FOTO, to improve QOL. [x] Progressing  [] MET   [] Not MET  [] Not tested    6. Patient to improve score on 5 times sit to stand and single leg balance in order to decrease fall risk. [x] Progressing  [] MET   [] Not MET  [] Not tested      Long Term Goals: In 8 weeks Progress Date   1.Patient to improve score on the FOTO predicted score or better, to improve QOL. [x] Progressing  [] MET   [] Not MET  [] Not tested    2. Patient to increase bilateral LE strength to 4+/5 or greater, in order to improve endurance and increase ability to perform all functional activities for increased time. [x] Progressing  [] MET   [] Not MET  [] Not tested    3. Patient to normalize score on 5 times sit to stand and single leg balance, in order to improve endurance and decrease fall risk. [x] Progressing  [] MET   [] Not MET  [] Not tested    4. Patient to perform daily activities including walking/standing for increased time (with assistive device if indicated) with only mild increased symptoms. [x] Progressing  [] MET   [] Not MET  [] Not tested      PLAN     Plan of care Certification: 1/25/2024  to 3/35/24.    Outpatient Physical Therapy 2 times weekly for 8 weeks to include any combination of the following interventions: Aquatic Therapy, Gait Training, Manual Therapy, Neuromuscular Re-ed, Patient Education/Self Care, Therapeutic Activites, Therapeutic Exercise, and Moist Hot Pack/Cold Pack    Vanda Pascual, PT

## 2024-01-25 NOTE — PATIENT INSTRUCTIONS
[facility]:  [address], [place] [address]   [phone]    Jenn Chirinos   [address], [place] [address]    [phone]    [facility]  [address], [place] [address]   [phone]      [facility]   [address] [facility] [address]    [phone]    [facility]  [address], [place] [address]  [phone]      [facility]  [address], [place] [address]    [phone]    [address] [place] [address]  [phone]      [facility]  [address], [place] [address]   [phone]      [facility]  [address], [place] [address]  [phone]    Pending [facility] to [facility]  [address] [place] [address]  [phone]    [facility]  [address] [place] [address]  [phone]      [facility]  [address], [place] [address]  [phone]

## 2024-01-26 PROBLEM — R26.89 BALANCE PROBLEM: Status: ACTIVE | Noted: 2024-01-26

## 2024-01-26 PROBLEM — Z74.09 DECREASED FUNCTIONAL MOBILITY AND ENDURANCE: Status: ACTIVE | Noted: 2024-01-26

## 2024-01-26 PROBLEM — R53.1 DECREASED STRENGTH: Status: ACTIVE | Noted: 2024-01-26

## 2024-01-26 PROBLEM — R26.9 GAIT ABNORMALITY: Status: ACTIVE | Noted: 2024-01-26

## 2024-01-26 PROBLEM — R52 PAIN: Status: ACTIVE | Noted: 2024-01-26

## 2024-01-26 PROBLEM — M53.86 DECREASED RANGE OF MOTION OF LUMBAR SPINE: Status: RESOLVED | Noted: 2022-04-12 | Resolved: 2024-01-26

## 2024-02-05 NOTE — PATIENT INSTRUCTIONS
Insulin 70/30:  Breakfast: 120 units for normal meal, 110 units for big meal  Lunch: 90 units with normal meal, 80 units large meal  Dinner: 70 units for normal meal, 60 for large meal   no

## 2024-02-09 ENCOUNTER — CLINICAL SUPPORT (OUTPATIENT)
Dept: REHABILITATION | Facility: HOSPITAL | Age: 74
End: 2024-02-09
Payer: MEDICARE

## 2024-02-09 DIAGNOSIS — R52 PAIN: ICD-10-CM

## 2024-02-09 DIAGNOSIS — R53.1 DECREASED STRENGTH: ICD-10-CM

## 2024-02-09 DIAGNOSIS — Z74.09 DECREASED FUNCTIONAL MOBILITY AND ENDURANCE: Primary | ICD-10-CM

## 2024-02-09 DIAGNOSIS — R26.9 GAIT ABNORMALITY: ICD-10-CM

## 2024-02-09 DIAGNOSIS — R26.89 BALANCE PROBLEM: ICD-10-CM

## 2024-02-09 PROCEDURE — 97113 AQUATIC THERAPY/EXERCISES: CPT

## 2024-02-09 NOTE — PROGRESS NOTES
[facility]   Physical Therapy Treatment Note        Name: Annika Mac  Clinic Number: [id number]    Therapy Diagnosis:   Encounter Diagnoses   Name Primary?    Decreased functional mobility and endurance Yes    Gait abnormality     Balance problem     Decreased strength     Pain      Physician: Evangelist Hamilton FNP    Visit Date: 2/9/2024    Physician Orders: PT Eval and Treat  Medical Diagnosis from Referral: Lumbar radiculopathy; DDD (degenerative disc disease), lumbar; Dorsalgia of lumbar region  Evaluation Date: 1/25/2024  Authorization Period Expiration: 2/5/24  Plan of Care Expiration: 3/25/2024  Progress Note Due: 2/25/2024  Visit # / Visits authorized: 1/20 (+1)  FOTO: 1/3 (last performed on 1/25/2024)  PTA Visit #: 0/5     Precautions: Standard, Diabetes, Fall, and PAD, hx of DVT, COPD, asthma, anterolisthesis        Time In: 12:20 pm  Time Out: 1:20 pm  Total Billable Time: 53 minutes      SUBJECTIVE     Pt reports: she was unable to find her exercises in [employer], she rates her pain at about a 5/10 today. She was unable to find her exercises in [employer] and was unable to find her old home exercise program from her prior therapy sessions. She is trying to walk more, but does need to use her walker.    She was not compliant with home exercise program, because she couldn't find the exercises'.  Response to previous treatment: no change  Functional change: walking more    Pain: 5/10  Location:  bilateral back  and down legs to middle of calf     OBJECTIVE     Objective Measures updated at progress report unless specified.       TREATMENT       Annika received the treatments listed below:         Annika Mac received aquatic therapy with the use of water to decrease the pull of gravity and weightbearing forces on the body to increase tolerance to resisted therapeutic exercise; including detailed instruction of all therapeutic exercise as well as stepping in and out of the pool with use of  "handrail and step-to pattern for 53 minutes including:    Exercise 2/9/2024 Parameters   FW/BW walking  x 6 minutes   side-stepping   x 3 minutes each way        Marching, alternating x 3 minutes   Hamstring curls, alternating  x 3 minutes   Hip  flexion x 1 min each bilateral   Hip abduction  x 1 min each bilateral   Hip extension  x 1 min each bilateral        Bicycle FW/BW  2 minutes each    Flutter kick  2 minutes    Scissor kicks  2 minutes         Quad stretch on step   2   Hamstring stretch on step   2 x 30"    GSS at wall  2 x 30"         LAQ x  10x/ 2 sets Sitting on 2nd step  (facing the parking lot) feet on 4th step              Stand DL support no hands x 30 sec/ 3 times   Heel/toe raises x 10x/ 3 sets   1/4 squat  x 10x/ 3 sets    Standing hip circles CW/CCW x 10 each    Standing figure 8   10x each         UE alt flexion bilateral paddles     UE alt ABD bilateral  paddles     UE alt hugs bilateral  paddles     UE IR/ER bilateral  paddles     UE alt rows bilateral  paddles Unable  Unable to stand without hand support                            PATIENT EDUCATION AND HOME EXERCISES     Home Exercises Provided and Patient Education Provided     Education provided:   - Importance of strength    Written Home Exercises Provided: Patient instructed to cont prior HEP. Exercises were reviewed and Annika was able to demonstrate them prior to the end of the session.  Annika demonstrated good  understanding of the education provided. See EMR under Patient Instructions for exercises provided during therapy sessions    ASSESSMENT     Patient tolerated all treatment but was fearful and had difficulty with standing without upper extremities support. She requires extensive cues and increased time for all interventions and has difficulty maintaining neutral trunk position with movement of lower extremities'. Encouraged home exercise program and increased activity as tolerated.    Annika Is progressing well towards her goals. "   Pt prognosis is Fair.     Pt will continue to benefit from skilled outpatient physical therapy to address the deficits listed in the problem list box on initial evaluation, provide pt/family education and to maximize pt's level of independence in the home and community environment.     Pt's spiritual, cultural and educational needs considered and pt agreeable to plan of care and goals.     Anticipated barriers to physical therapy: co-morbidities, sedentary lifestyle, chronicity of condition, lack of understanding of condition, adherence to treatment plan, transportation, coping style, and extensive prior therapy     Goals:   Reviewed: 2/9/2024        Short Term Goals: In 4 weeks  Progress Date   1.Patient to be educated on HEP. [x] Progressing  [] MET   [] Not MET   [] Not tested     2.Patient to increase trunk range of motion, in order to improve available range of motion for ADL's.  [x] Progressing  [] MET   [] Not MET  [] Not tested     3.Patient to increase bilateral LE strength by 1/2 grade, in order to improve endurance and increase ability to perform all functional activities for increased time.  [] Progressing  [] MET   [] Not MET  [] Not tested     4.Patient to have pain less than 2/10 at worst, to improve QOL. [x] Progressing  [] MET   [] Not MET  [] Not tested     5.Patient to improve score on the FOTO, to improve QOL. [x] Progressing  [] MET   [] Not MET  [] Not tested     6. Patient to improve score on 5 times sit to stand and single leg balance in order to decrease fall risk. [x] Progressing  [] MET   [] Not MET  [] Not tested        Long Term Goals: In 8 weeks Progress Date   1.Patient to improve score on the FOTO predicted score or better, to improve QOL. [x] Progressing  [] MET   [] Not MET  [] Not tested     2. Patient to increase bilateral LE strength to 4+/5 or greater, in order to improve endurance and increase ability to perform all functional activities for increased time. [x]  Progressing  [] MET   [] Not MET  [] Not tested     3. Patient to normalize score on 5 times sit to stand and single leg balance, in order to improve endurance and decrease fall risk. [x] Progressing  [] MET   [] Not MET  [] Not tested     4. Patient to perform daily activities including walking/standing for increased time (with assistive device if indicated) with only mild increased symptoms. [x] Progressing  [] MET   [] Not MET  [] Not tested          PLAN     Monitor response to today's treatment session and progress with Physical Therapy plan of care as indicated.    Plan of care Certification: 1/25/2024 to 3/35/24.     Outpatient Physical Therapy 2 times weekly for 8 weeks to include any combination of the following interventions: Aquatic Therapy, Gait Training, Manual Therapy, Neuromuscular Re-ed, Patient Education/Self Care, Therapeutic Activites, Therapeutic Exercise, and Moist Hot Pack/Cold Pack    Vanda Pascual, PT

## 2024-02-12 ENCOUNTER — CLINICAL SUPPORT (OUTPATIENT)
Dept: REHABILITATION | Facility: HOSPITAL | Age: 74
End: 2024-02-12
Payer: MEDICARE

## 2024-02-12 DIAGNOSIS — R53.1 DECREASED STRENGTH: ICD-10-CM

## 2024-02-12 DIAGNOSIS — Z74.09 DECREASED FUNCTIONAL MOBILITY AND ENDURANCE: Primary | ICD-10-CM

## 2024-02-12 DIAGNOSIS — R26.9 GAIT ABNORMALITY: ICD-10-CM

## 2024-02-12 DIAGNOSIS — R52 PAIN: ICD-10-CM

## 2024-02-12 DIAGNOSIS — R26.89 BALANCE PROBLEM: ICD-10-CM

## 2024-02-12 PROCEDURE — 97113 AQUATIC THERAPY/EXERCISES: CPT

## 2024-02-12 NOTE — PROGRESS NOTES
[facility]   Physical Therapy Treatment Note        Name: Annika Mac  Clinic Number: [id number]    Therapy Diagnosis:   Encounter Diagnoses   Name Primary?    Decreased functional mobility and endurance Yes    Gait abnormality     Balance problem     Decreased strength     Pain      Physician: Evangelist Hamilton FNP    Visit Date: 2/12/2024    Physician Orders: PT Eval and Treat  Medical Diagnosis from Referral: Lumbar radiculopathy; DDD (degenerative disc disease), lumbar; Dorsalgia of lumbar region  Evaluation Date: 1/25/2024  Authorization Period Expiration: 2/5/24  Plan of Care Expiration: 3/25/2024  Progress Note Due: 2/25/2024  Visit # / Visits authorized: 1/20 (+1)  FOTO: 1/3 (last performed on 1/25/2024)  PTA Visit #: 0/5     Precautions: Standard, Diabetes, Fall, and PAD, hx of DVT, COPD, asthma, anterolisthesis        Time In: 9:10 am  Time Out: 9:57 am  Total Billable Time: 47 minutes      SUBJECTIVE     Pt reports: she thought that today was the 19th - glad that there was a cancellation so she could be seen. She reports that it takes a long time for her get here. She reports that she had increased knee pain right side. She reports that she was sore in it since therapy but also was up more with granddaughter staying with her.     She was not compliant with home exercise program, because she couldn't find the exercises'.  Response to previous treatment: no change  Functional change: walking more    Pain: 4/10 back, 7/10 in right knee  Location:  bilateral back  and down legs to middle of calf     OBJECTIVE     Objective Measures updated at progress report unless specified.       TREATMENT       Annika received the treatments listed below:         Annika Mac received aquatic therapy with the use of water to decrease the pull of gravity and weightbearing forces on the body to increase tolerance to resisted therapeutic exercise; including detailed instruction of all therapeutic  "exercise as well as stepping in and out of the pool with use of handrail and step-to pattern for 53 minutes including:    Exercise 2/12/2024 Parameters   FW/BW walking  x 6 minutes   side-stepping   x 3 minutes each way        Marching, alternating x 3 minutes   Hamstring curls, alternating  x 3 minutes   Hip  flexion x 1 min each bilateral   Hip abduction  x 1 min each bilateral   Hip extension  x 1 min each bilateral        Bicycle FW/BW  2 minutes each    Flutter kick  2 minutes    Scissor kicks  2 minutes         Quad stretch on step   2   Hamstring stretch on step   2 x 30"    GSS at wall  2 x 30"         LAQ x  10x/ 2 sets Sitting on 2nd step  (facing the parking lot) feet on 4th step              Stand DL support no hands x 30 sec/ 3 times   Heel/toe raises x 10x/ 3 sets   1/4 squat  x 10x/ 3 sets    Standing hip circles CW/CCW Hold time 10 each    Standing figure 8   10x each         UE alt flexion bilateral paddles     UE alt ABD bilateral  paddles     UE alt hugs bilateral  paddles     UE IR/ER bilateral  paddles     UE alt rows bilateral  paddles Unable  Unable to stand without hand support                            PATIENT EDUCATION AND HOME EXERCISES     Home Exercises Provided and Patient Education Provided     Education provided:   - Importance of strength    Written Home Exercises Provided: Patient instructed to cont prior HEP. Exercises were reviewed and Annika was able to demonstrate them prior to the end of the session.  Annika demonstrated good  understanding of the education provided. See EMR under Patient Instructions for exercises provided during therapy sessions    ASSESSMENT     Patient tolerated all treatment with extensive cues and increased time to complete all interventions. She tolerated increased cues for technique with all interventions. Full treatment session not completed due to patient's increased time to change into pool attire.    Annika Is progressing well towards her goals.   Pt " prognosis is Fair.     Pt will continue to benefit from skilled outpatient physical therapy to address the deficits listed in the problem list box on initial evaluation, provide pt/family education and to maximize pt's level of independence in the home and community environment.     Pt's spiritual, cultural and educational needs considered and pt agreeable to plan of care and goals.     Anticipated barriers to physical therapy: co-morbidities, sedentary lifestyle, chronicity of condition, lack of understanding of condition, adherence to treatment plan, transportation, coping style, and extensive prior therapy     Goals:   Reviewed: 2/12/2024        Short Term Goals: In 4 weeks  Progress Date   1.Patient to be educated on HEP. [x] Progressing  [] MET   [] Not MET   [] Not tested     2.Patient to increase trunk range of motion, in order to improve available range of motion for ADL's.  [x] Progressing  [] MET   [] Not MET  [] Not tested     3.Patient to increase bilateral LE strength by 1/2 grade, in order to improve endurance and increase ability to perform all functional activities for increased time.  [] Progressing  [] MET   [] Not MET  [] Not tested     4.Patient to have pain less than 2/10 at worst, to improve QOL. [x] Progressing  [] MET   [] Not MET  [] Not tested     5.Patient to improve score on the FOTO, to improve QOL. [x] Progressing  [] MET   [] Not MET  [] Not tested     6. Patient to improve score on 5 times sit to stand and single leg balance in order to decrease fall risk. [x] Progressing  [] MET   [] Not MET  [] Not tested        Long Term Goals: In 8 weeks Progress Date   1.Patient to improve score on the FOTO predicted score or better, to improve QOL. [x] Progressing  [] MET   [] Not MET  [] Not tested     2. Patient to increase bilateral LE strength to 4+/5 or greater, in order to improve endurance and increase ability to perform all functional activities for increased time. [x] Progressing  []  MET   [] Not MET  [] Not tested     3. Patient to normalize score on 5 times sit to stand and single leg balance, in order to improve endurance and decrease fall risk. [x] Progressing  [] MET   [] Not MET  [] Not tested     4. Patient to perform daily activities including walking/standing for increased time (with assistive device if indicated) with only mild increased symptoms. [x] Progressing  [] MET   [] Not MET  [] Not tested          PLAN     Monitor response to today's treatment session and progress with Physical Therapy plan of care as indicated.    Plan of care Certification: 1/25/2024 to 3/35/24.     Outpatient Physical Therapy 2 times weekly for 8 weeks to include any combination of the following interventions: Aquatic Therapy, Gait Training, Manual Therapy, Neuromuscular Re-ed, Patient Education/Self Care, Therapeutic Activites, Therapeutic Exercise, and Moist Hot Pack/Cold Pack    Vanda Pascual, PT

## 2024-02-19 ENCOUNTER — CLINICAL SUPPORT (OUTPATIENT)
Dept: REHABILITATION | Facility: HOSPITAL | Age: 74
End: 2024-02-19
Payer: MEDICARE

## 2024-02-19 DIAGNOSIS — Z74.09 DECREASED FUNCTIONAL MOBILITY AND ENDURANCE: Primary | ICD-10-CM

## 2024-02-19 DIAGNOSIS — R52 PAIN: ICD-10-CM

## 2024-02-19 DIAGNOSIS — R26.89 BALANCE PROBLEM: ICD-10-CM

## 2024-02-19 DIAGNOSIS — R53.1 DECREASED STRENGTH: ICD-10-CM

## 2024-02-19 DIAGNOSIS — R26.9 GAIT ABNORMALITY: ICD-10-CM

## 2024-02-19 PROCEDURE — 97113 AQUATIC THERAPY/EXERCISES: CPT

## 2024-02-19 NOTE — PROGRESS NOTES
[facility]   Physical Therapy Treatment Note        Name: Annika Mac  Clinic Number: [id number]    Therapy Diagnosis:   Encounter Diagnoses   Name Primary?    Decreased functional mobility and endurance Yes    Gait abnormality     Balance problem     Decreased strength     Pain      Physician: Evangelist Hamilton FNP    Visit Date: 2/19/2024    Physician Orders: PT Eval and Treat  Medical Diagnosis from Referral: Lumbar radiculopathy; DDD (degenerative disc disease), lumbar; Dorsalgia of lumbar region  Evaluation Date: 1/25/2024  Authorization Period Expiration: 2/5/24  Plan of Care Expiration: 3/25/2024  Progress Note Due: 2/25/2024  Visit # / Visits authorized: 1/20 (+1)  FOTO: 1/3 (last performed on 1/25/2024)  PTA Visit #: 0/5     Precautions: Standard, Diabetes, Fall, and PAD, hx of DVT, COPD, asthma, anterolisthesis        Time In: 9:05 am  Time Out: 9:58 am  Total Billable Time: 53 minutes      SUBJECTIVE     Pt reports: she felt better after last treatment session, feels like her legs are less tight. She was able to do some exercises that was given here and from her last therapy in [place].    She was compliant with home exercise program.  Response to previous treatment: no change  Functional change: walking more    Pain: 6/10 back, 4/10 in right knee  Location:  bilateral back  and down legs to middle of calf     OBJECTIVE     Objective Measures updated at progress report unless specified.       TREATMENT       Annika received the treatments listed below:         Annika Mac received aquatic therapy with the use of water to decrease the pull of gravity and weightbearing forces on the body to increase tolerance to resisted therapeutic exercise; including detailed instruction of all therapeutic exercise as well as stepping in and out of the pool with use of handrail and step-to pattern for 53 minutes including:    Exercise 2/19/2024 Parameters   FW/BW walking  x 6 minutes  "  side-stepping   x 3 minutes each way        Marching, alternating x 3 minutes   Hamstring curls, alternating  x 3 minutes   Hip  flexion x 1 min each bilateral   Hip abduction  x 1 min each bilateral   Hip extension  x 1 min each bilateral        Bicycle FW/BW  2 minutes each    Flutter kick  2 minutes    Scissor kicks  2 minutes         Quad stretch on step   2   Hamstring stretch on step   2 x 30"    GSS at wall  2 x 30"         LAQ x  10x/ 3 sets Sitting on 2nd step  (facing the parking lot) feet on 4th step              Stand DL support no hands x 30 sec/ 3 times   Heel/toe raises x 10x/ 3 sets   1/4 squat 2nd step x 10x/ 3 sets    Standing hip circles CW/CCW x 10x each    Standing figure 8   10x each         UE alt flexion bilateral paddles     UE alt ABD bilateral  paddles     UE alt hugs bilateral  paddles     UE IR/ER bilateral  paddles     UE alt rows bilateral  paddles Unable  Unable to stand without hand support                            PATIENT EDUCATION AND HOME EXERCISES     Home Exercises Provided and Patient Education Provided     Education provided:   - Importance of strength    Written Home Exercises Provided: Patient instructed to cont prior HEP. Exercises were reviewed and Annika was able to demonstrate them prior to the end of the session.  Annika demonstrated good  understanding of the education provided. See EMR under Patient Instructions for exercises provided during therapy sessions    ASSESSMENT     Patient tolerated all treatment with extensive cues and increased time to complete all interventions. She was somewhat apprehensive during transition to treadmill level today due to increased depth of pool and required verbal assistance to get in middle of bars. She was able to perform all activities today as noted. Encouraged home exercise program.    Annika Is progressing well towards her goals.   Pt prognosis is Fair.     Pt will continue to benefit from skilled outpatient physical therapy to " address the deficits listed in the problem list box on initial evaluation, provide pt/family education and to maximize pt's level of independence in the home and community environment.     Pt's spiritual, cultural and educational needs considered and pt agreeable to plan of care and goals.     Anticipated barriers to physical therapy: co-morbidities, sedentary lifestyle, chronicity of condition, lack of understanding of condition, adherence to treatment plan, transportation, coping style, and extensive prior therapy     Goals:   Reviewed: 2/19/2024        Short Term Goals: In 4 weeks  Progress Date   1.Patient to be educated on HEP. [x] Progressing  [] MET   [] Not MET   [] Not tested     2.Patient to increase trunk range of motion, in order to improve available range of motion for ADL's.  [x] Progressing  [] MET   [] Not MET  [] Not tested     3.Patient to increase bilateral LE strength by 1/2 grade, in order to improve endurance and increase ability to perform all functional activities for increased time.  [] Progressing  [] MET   [] Not MET  [] Not tested     4.Patient to have pain less than 2/10 at worst, to improve QOL. [x] Progressing  [] MET   [] Not MET  [] Not tested     5.Patient to improve score on the FOTO, to improve QOL. [x] Progressing  [] MET   [] Not MET  [] Not tested     6. Patient to improve score on 5 times sit to stand and single leg balance in order to decrease fall risk. [x] Progressing  [] MET   [] Not MET  [] Not tested        Long Term Goals: In 8 weeks Progress Date   1.Patient to improve score on the FOTO predicted score or better, to improve QOL. [x] Progressing  [] MET   [] Not MET  [] Not tested     2. Patient to increase bilateral LE strength to 4+/5 or greater, in order to improve endurance and increase ability to perform all functional activities for increased time. [x] Progressing  [] MET   [] Not MET  [] Not tested     3. Patient to normalize score on 5 times sit to stand and  single leg balance, in order to improve endurance and decrease fall risk. [x] Progressing  [] MET   [] Not MET  [] Not tested     4. Patient to perform daily activities including walking/standing for increased time (with assistive device if indicated) with only mild increased symptoms. [x] Progressing  [] MET   [] Not MET  [] Not tested          PLAN     Monitor response to today's treatment session and progress with Physical Therapy plan of care as indicated.    Plan of care Certification: 1/25/2024 to 3/35/24.     Outpatient Physical Therapy 2 times weekly for 8 weeks to include any combination of the following interventions: Aquatic Therapy, Gait Training, Manual Therapy, Neuromuscular Re-ed, Patient Education/Self Care, Therapeutic Activites, Therapeutic Exercise, and Moist Hot Pack/Cold Pack    Vanda Pascual, PT

## 2024-02-23 ENCOUNTER — CLINICAL SUPPORT (OUTPATIENT)
Dept: REHABILITATION | Facility: HOSPITAL | Age: 74
End: 2024-02-23
Payer: MEDICARE

## 2024-02-23 DIAGNOSIS — R53.1 DECREASED STRENGTH: ICD-10-CM

## 2024-02-23 DIAGNOSIS — R26.9 GAIT ABNORMALITY: ICD-10-CM

## 2024-02-23 DIAGNOSIS — Z74.09 DECREASED FUNCTIONAL MOBILITY AND ENDURANCE: Primary | ICD-10-CM

## 2024-02-23 DIAGNOSIS — R52 PAIN: ICD-10-CM

## 2024-02-23 DIAGNOSIS — R26.89 BALANCE PROBLEM: ICD-10-CM

## 2024-02-23 PROCEDURE — 97113 AQUATIC THERAPY/EXERCISES: CPT | Performed by: PHYSICAL THERAPIST

## 2024-02-23 NOTE — PROGRESS NOTES
[facility]   Physical Therapy Treatment Note        Name: Annika Mac  Clinic Number: [id number]    Therapy Diagnosis:   Encounter Diagnoses   Name Primary?    Decreased functional mobility and endurance Yes    Gait abnormality     Balance problem     Decreased strength     Pain      Physician: Evangelist Hamilton FNP    Visit Date: 2/23/2024    Physician Orders: PT Eval and Treat  Medical Diagnosis from Referral: Lumbar radiculopathy; DDD (degenerative disc disease), lumbar; Dorsalgia of lumbar region  Evaluation Date: 1/25/2024  Authorization Period Expiration: 2/5/24  Plan of Care Expiration: 3/25/2024  Progress Note Due: 2/25/2024  Visit # / Visits authorized: 4/20 (+1)  FOTO: 1/3 (last performed on 1/25/2024)  PTA Visit #: 0/5     Precautions: Standard, Diabetes, Fall, and PAD, hx of DVT, COPD, asthma, anterolisthesis        Time In: 10:29 am  Time Out: 11:15 am  Total Billable Time: 46 minutes      SUBJECTIVE     Pt reports: she is hurting more today in her back per report.     She was compliant with home exercise program.  Response to previous treatment: no change  Functional change: walking more    Pain: 6/10 back, <4/10 in right knee  Location:  bilateral back  and down legs to middle of calf     OBJECTIVE     Objective Measures updated at progress report unless specified.       TREATMENT       Annika received the treatments listed below:         Annika Mac received aquatic therapy with the use of water to decrease the pull of gravity and weightbearing forces on the body to increase tolerance to resisted therapeutic exercise; including detailed instruction of all therapeutic exercise as well as stepping in and out of the pool with use of handrail and step-to pattern for 46 minutes including:    Exercise 2/23/2024 Parameters   FW/BW walking  x 6 minutes   side-stepping   x 3 minutes each way        Marching, alternating x 3 minutes   Hamstring curls, alternating  x 3 minutes   Hip   "flexion x 1 min each bilateral   Hip abduction  x 1 min each bilateral   Hip extension  x 1 min each bilateral        Bicycle FW/BW x 2 minutes fwd sitting on step    Flutter kick  2 minutes    Scissor kicks  2 minutes         Quad stretch on step   2   Hamstring stretch on step   2 x 30"    GSS at wall  2 x 30"         LAQ x  10x/ 3 sets Sitting on 2nd step  (facing the parking lot) feet on 4th step              Stand DL support no hands x 30 sec/ 3 times   Heel/toe raises x 10x/ 3 sets   1/4 squat 2nd step x 10x/ 3 sets    Standing hip circles CW/CCW x 10x each    Standing figure 8   10x each         UE alt flexion bilateral paddles     UE alt ABD bilateral  paddles     UE alt hugs bilateral  paddles     UE IR/ER bilateral  paddles     UE alt rows bilateral  paddles Unable  Unable to stand without hand support                            PATIENT EDUCATION AND HOME EXERCISES     Home Exercises Provided and Patient Education Provided     Education provided:   - Importance of strength    Written Home Exercises Provided: Patient instructed to cont prior HEP. Exercises were reviewed and Annika was able to demonstrate them prior to the end of the session.  Annika demonstrated good  understanding of the education provided. See EMR under Patient Instructions for exercises provided during therapy sessions    ASSESSMENT     Patient continues to need consistent cues for form and technique.  Increased cues for slowing speed of movement and for increasing core activation.  Patient challenged still with letting go of the bars but encouraged her to continue with engaging her core and bringing her posture upright to increase her stability.     Annika Is progressing well towards her goals.   Pt prognosis is Fair.     Pt will continue to benefit from skilled outpatient physical therapy to address the deficits listed in the problem list box on initial evaluation, provide pt/family education and to maximize pt's level of independence in the " home and community environment.     Pt's spiritual, cultural and educational needs considered and pt agreeable to plan of care and goals.     Anticipated barriers to physical therapy: co-morbidities, sedentary lifestyle, chronicity of condition, lack of understanding of condition, adherence to treatment plan, transportation, coping style, and extensive prior therapy     Goals:   Reviewed: 2/23/2024        Short Term Goals: In 4 weeks  Progress Date   1.Patient to be educated on HEP. [x] Progressing  [] MET   [] Not MET   [] Not tested     2.Patient to increase trunk range of motion, in order to improve available range of motion for ADL's.  [x] Progressing  [] MET   [] Not MET  [] Not tested     3.Patient to increase bilateral LE strength by 1/2 grade, in order to improve endurance and increase ability to perform all functional activities for increased time.  [] Progressing  [] MET   [] Not MET  [] Not tested     4.Patient to have pain less than 2/10 at worst, to improve QOL. [x] Progressing  [] MET   [] Not MET  [] Not tested     5.Patient to improve score on the FOTO, to improve QOL. [x] Progressing  [] MET   [] Not MET  [] Not tested     6. Patient to improve score on 5 times sit to stand and single leg balance in order to decrease fall risk. [x] Progressing  [] MET   [] Not MET  [] Not tested        Long Term Goals: In 8 weeks Progress Date   1.Patient to improve score on the FOTO predicted score or better, to improve QOL. [x] Progressing  [] MET   [] Not MET  [] Not tested     2. Patient to increase bilateral LE strength to 4+/5 or greater, in order to improve endurance and increase ability to perform all functional activities for increased time. [x] Progressing  [] MET   [] Not MET  [] Not tested     3. Patient to normalize score on 5 times sit to stand and single leg balance, in order to improve endurance and decrease fall risk. [x] Progressing  [] MET   [] Not MET  [] Not tested     4. Patient to perform  daily activities including walking/standing for increased time (with assistive device if indicated) with only mild increased symptoms. [x] Progressing  [] MET   [] Not MET  [] Not tested          PLAN     Monitor response to today's treatment session and progress with Physical Therapy plan of care as indicated.    Plan of care Certification: 1/25/2024 to 3/35/24.     Outpatient Physical Therapy 2 times weekly for 8 weeks to include any combination of the following interventions: Aquatic Therapy, Gait Training, Manual Therapy, Neuromuscular Re-ed, Patient Education/Self Care, Therapeutic Activites, Therapeutic Exercise, and Moist Hot Pack/Cold Pack    Cee Perdue, PT

## 2024-02-27 ENCOUNTER — CLINICAL SUPPORT (OUTPATIENT)
Dept: REHABILITATION | Facility: HOSPITAL | Age: 74
End: 2024-02-27
Payer: MEDICARE

## 2024-02-27 DIAGNOSIS — R52 PAIN: ICD-10-CM

## 2024-02-27 DIAGNOSIS — R53.1 DECREASED STRENGTH: ICD-10-CM

## 2024-02-27 DIAGNOSIS — R26.9 GAIT ABNORMALITY: ICD-10-CM

## 2024-02-27 DIAGNOSIS — R26.89 BALANCE PROBLEM: ICD-10-CM

## 2024-02-27 DIAGNOSIS — Z74.09 DECREASED FUNCTIONAL MOBILITY AND ENDURANCE: Primary | ICD-10-CM

## 2024-02-27 PROCEDURE — 97113 AQUATIC THERAPY/EXERCISES: CPT

## 2024-02-27 NOTE — PROGRESS NOTES
[facility]   Physical Therapy Treatment Note & PROGRESS NOTE       Name: Annika Mac  Clinic Number: [id number]    Therapy Diagnosis:   Encounter Diagnoses   Name Primary?    Decreased functional mobility and endurance Yes    Gait abnormality     Balance problem     Decreased strength     Pain      Physician: Evangelist Hamilton FNP    Visit Date: 2/27/2024    Physician Orders: PT Eval and Treat  Medical Diagnosis from Referral: Lumbar radiculopathy; DDD (degenerative disc disease), lumbar; Dorsalgia of lumbar region  Evaluation Date: 1/25/2024  Authorization Period Expiration:12/31/24  Plan of Care Expiration: 3/25/2024  Progress Note Due: 3/25/2024  Visit # / Visits authorized: 6/20 (+1)  FOTO: 2/3 (last performed on 2/27/2024)  PTA Visit #: 0/5     Precautions: Standard, Diabetes, Fall, and PAD, hx of DVT, COPD, asthma, anterolisthesis        Time In: 10:35 am  Time Out: 11:35 am  Total Billable Time: 55 minutes      SUBJECTIVE     Pt reports: she is doing OK today, she was up more then usual doing more cooking and walking this weekend.     She was compliant with home exercise program.  Response to previous treatment: no change  Functional change: walking more    Pain: 4/10 back, 4/10 in right knee  Location:  bilateral back  and down legs to middle of calf     OBJECTIVE     Objective Measures updated at progress report unless specified.        Balance  Right   (seconds) Left  (seconds) Norms 2/27/24  Right/Left   Single Leg Stance 4 5 Less than 4.9 sec high risk  5-6.4 sec increased risk  6.5 or greater low risk 3 sec / 5 sec         Functional Tests  Outcome Norms 2/27/24   Five Time Sit to Stand 22.65 sec 60s: <11.4 sec  70s: <12.6 sec  80s: 14.8 sec  Greater than 14 sec high risk  12.1-14 sec increased risk  12 sec or less low risk 16.62         Range of Motion:        AROM:  Motion: Movement Results: 2/27/24   Multi-Segmental Flexion mid knee NT   Multi-Segmental Extension Defer due due  "anterolisthesis NT   Multi-Segmental Rotation 30% NT   Arms Down Deep Squat defer NT         Strength:     L/E MMT Right  (spine) Left Pain/Dysfunction with Movement 2/27/24  Bilateral    Hip Flexion  3-/5 3-/5   4/5   Knee Extension 4/5 4/5   4+/5   Knee Flexion 4/5 4/5   4+/5   Ankle DF 4+/5 4+/5   NT   Ankle PF 4+/5 4+/5   NT      FOTO:        TREATMENT       Annika received the treatments listed below:         Annika Mac received aquatic therapy with the use of water to decrease the pull of gravity and weightbearing forces on the body to increase tolerance to resisted therapeutic exercise; including detailed instruction of all therapeutic exercise as well as stepping in and out of the pool with use of handrail and step-to pattern for 55 minutes including:    Exercise 2/27/2024 Parameters   FW/BW walking  x 6 minutes   side-stepping   x 3 minutes each way        Marching, alternating x 3 minutes   Hamstring curls, alternating  x 3 minutes   Hip  flexion x 1 min each bilateral   Hip abduction  x 1 min each bilateral   Hip extension  x 1 min each bilateral        Bicycle FW/BW x 2 minutes fwd sitting on step    Flutter kick  2 minutes    Scissor kicks  2 minutes         Quad stretch on step   2   Hamstring stretch on step   2 x 30"    GSS at wall  2 x 30"         LAQ x  10x/ 3 sets Sitting on 2nd step  (facing the parking lot) feet on 4th step              Stand DL support no hands x 30 sec/ 3 times   Heel/toe raises x 10x/ 3 sets   1/4 squat 2nd step x 10x/ 3 sets    Standing hip circles CW/CCW x 10x each bilateral    Standing figure 8   10x each         UE alt flexion bilateral paddles     UE alt ABD bilateral  paddles     UE alt hugs bilateral  paddles     UE IR/ER bilateral  paddles     UE alt rows bilateral  paddles Unable  Unable to stand without hand support                  FOTO/Re-assess x         PATIENT EDUCATION AND HOME EXERCISES     Home Exercises Provided and Patient Education Provided     Education " provided:   - Importance of strength    Written Home Exercises Provided: Patient instructed to cont prior HEP. Exercises were reviewed and Annika was able to demonstrate them prior to the end of the session.  Annika demonstrated good  understanding of the education provided. See EMR under Patient Instructions for exercises provided during therapy sessions    ASSESSMENT     Patient continues to need consistent cues for form and technique.  She has made good progress with objective measurements, and functional measurements as well. She is reporting slightly less pain today as well. Encouraged home exercise program and continuing to increase overall activity as tolerated. Patient should benefit from continued therapy treatment to address remaining goals.     Annika Is progressing well towards her goals.   Pt prognosis is Fair.     Pt will continue to benefit from skilled outpatient physical therapy to address the deficits listed in the problem list box on initial evaluation, provide pt/family education and to maximize pt's level of independence in the home and community environment.     Pt's spiritual, cultural and educational needs considered and pt agreeable to plan of care and goals.     Anticipated barriers to physical therapy: co-morbidities, sedentary lifestyle, chronicity of condition, lack of understanding of condition, adherence to treatment plan, transportation, coping style, and extensive prior therapy     Goals:   Reviewed: 2/27/2024        Short Term Goals: In 4 weeks  Progress Date   1.Patient to be educated on HEP. [] Progressing  [x] MET   [] Not MET   [] Not tested  2/27/2024   2.Patient to increase trunk range of motion, in order to improve available range of motion for ADL's.  [] Progressing  [] MET   [] Not MET  [x] Not tested  2/27/2024   3.Patient to increase bilateral LE strength by 1/2 grade, in order to improve endurance and increase ability to perform all functional activities for increased time.   [] Progressing  [x] MET   [] Not MET  [] Not tested  2/27/2024   4.Patient to have pain less than 2/10 at worst, to improve QOL. [x] Progressing  [] MET   [] Not MET  [] Not tested  2/27/2024   5.Patient to improve score on the FOTO, to improve QOL. [x] Progressing  [] MET   [] Not MET  [] Not tested  2/27/2024   6. Patient to improve score on 5 times sit to stand and single leg balance in order to decrease fall risk. [x] Progressing  [] MET   [] Not MET  [] Not tested  2/27/2024      Long Term Goals: In 8 weeks Progress Date   1.Patient to improve score on the FOTO predicted score or better, to improve QOL. [x] Progressing  [] MET   [] Not MET  [] Not tested  2/27/2024   2. Patient to increase bilateral LE strength to 4+/5 or greater, in order to improve endurance and increase ability to perform all functional activities for increased time. [x] Progressing  [] MET   [] Not MET  [] Not tested  2/27/2024   3. Patient to normalize score on 5 times sit to stand and single leg balance, in order to improve endurance and decrease fall risk. [x] Progressing  [] MET   [] Not MET  [] Not tested  2/27/2024   4. Patient to perform daily activities including walking/standing for increased time (with assistive device if indicated) with only mild increased symptoms. [x] Progressing  [] MET   [] Not MET  [] Not tested  2/27/2024        PLAN     Monitor response to today's treatment session and progress with Physical Therapy plan of care as indicated.    Plan of care Certification: 1/25/2024 to 3/35/24.     Outpatient Physical Therapy 2 times weekly for 8 weeks to include any combination of the following interventions: Aquatic Therapy, Gait Training, Manual Therapy, Neuromuscular Re-ed, Patient Education/Self Care, Therapeutic Activites, Therapeutic Exercise, and Moist Hot Pack/Cold Pack    Vanda Pascual, PT

## 2024-02-29 ENCOUNTER — CLINICAL SUPPORT (OUTPATIENT)
Dept: REHABILITATION | Facility: HOSPITAL | Age: 74
End: 2024-02-29
Payer: MEDICARE

## 2024-02-29 DIAGNOSIS — R26.9 GAIT ABNORMALITY: ICD-10-CM

## 2024-02-29 DIAGNOSIS — Z74.09 DECREASED FUNCTIONAL MOBILITY AND ENDURANCE: Primary | ICD-10-CM

## 2024-02-29 DIAGNOSIS — R52 PAIN: ICD-10-CM

## 2024-02-29 DIAGNOSIS — R26.89 BALANCE PROBLEM: ICD-10-CM

## 2024-02-29 DIAGNOSIS — R53.1 DECREASED STRENGTH: ICD-10-CM

## 2024-02-29 PROCEDURE — 97113 AQUATIC THERAPY/EXERCISES: CPT | Performed by: PHYSICAL THERAPIST

## 2024-02-29 NOTE — PROGRESS NOTES
[facility]   Physical Therapy Treatment Note       Name: Annika Mac  Clinic Number: [id number]    Therapy Diagnosis:   Encounter Diagnoses   Name Primary?    Decreased functional mobility and endurance Yes    Gait abnormality     Balance problem     Decreased strength     Pain      Physician: Evangelist Hamilton FNP    Visit Date: 2/29/2024    Physician Orders: PT Eval and Treat  Medical Diagnosis from Referral: Lumbar radiculopathy; DDD (degenerative disc disease), lumbar; Dorsalgia of lumbar region  Evaluation Date: 1/25/2024  Authorization Period Expiration:12/31/24  Plan of Care Expiration: 3/25/2024  Progress Note Due: 3/25/2024  Visit # / Visits authorized: /20 (+1)  FOTO: 2/3 (last performed on 2/27/2024)  PTA Visit #: 0/5     Precautions: Standard, Diabetes, Fall, and PAD, hx of DVT, COPD, asthma, anterolisthesis        Time In: 1:30 pm  Time Out: 2:18 pm  Total Billable Time: 48 minutes    SUBJECTIVE     Pt reports: so far she is feeling great today, rating at 4/10.      She was compliant with home exercise program.  Response to previous treatment: no change  Functional change: walking more    Pain: 4/10 back, 4/10 in right knee  Location:  bilateral back  and down legs to middle of calf     OBJECTIVE     Objective Measures updated at progress report unless specified.     TREATMENT       Annika received the treatments listed below:      Annika Mac received aquatic therapy with the use of water to decrease the pull of gravity and weightbearing forces on the body to increase tolerance to resisted therapeutic exercise; including detailed instruction of all therapeutic exercise as well as stepping in and out of the pool with use of handrail and step-to pattern for 48  minutes including:    Exercise 2/29/2024 Parameters   FW/BW walking  x 6 minutes   side-stepping   x 3 minutes each way        Marching, alternating x 3 minutes   Hamstring curls, alternating  x 3 minutes   Hip  flexion x 1  "min each bilateral   Hip abduction  x 1 min each bilateral   Hip extension  x 1 min each bilateral        Bicycle FW/BW x 2 minutes fwd sitting on step    Flutter kick  2 minutes    Scissor kicks  2 minutes         Quad stretch on step   2   Hamstring stretch on step   2 x 30"    GSS at wall  2 x 30"         LAQ x  10x/ 3 sets Sitting on 2nd step  (facing the parking lot) feet on 4th step              Stand DL support no hands x 30 sec/ 3 times   Heel/toe raises x 10x/ 3 sets   1/4 squat 2nd step x 10x/ 3 sets    Standing hip circles CW/CCW x 10x each bilateral    Standing figure 8   10x each         UE alt flexion bilateral paddles     UE alt ABD bilateral  paddles     UE alt hugs bilateral  paddles     UE IR/ER bilateral  paddles     UE alt rows bilateral  paddles Unable  Unable to stand without hand support                  FOTO/Re-assess x         PATIENT EDUCATION AND HOME EXERCISES     Home Exercises Provided and Patient Education Provided     Education provided:   - Importance of strength    Written Home Exercises Provided: Patient instructed to cont prior HEP. Exercises were reviewed and Annika was able to demonstrate them prior to the end of the session.  Annika demonstrated good  understanding of the education provided. See EMR under Patient Instructions for exercises provided during therapy sessions    ASSESSMENT     Patient performed well today with continued cues.  She will progress to land next visit to increase functional activities and hep for land based exercises that will have carry-over into daily life.     Annika Is progressing well towards her goals.   Pt prognosis is Fair.     Pt will continue to benefit from skilled outpatient physical therapy to address the deficits listed in the problem list box on initial evaluation, provide pt/family education and to maximize pt's level of independence in the home and community environment.     Pt's spiritual, cultural and educational needs considered and pt " agreeable to plan of care and goals.     Anticipated barriers to physical therapy: co-morbidities, sedentary lifestyle, chronicity of condition, lack of understanding of condition, adherence to treatment plan, transportation, coping style, and extensive prior therapy     Goals:   Reviewed: 2/29/2024        Short Term Goals: In 4 weeks  Progress Date   1.Patient to be educated on HEP. [x] Progressing  [] MET   [] Not MET   [] Not tested     2.Patient to increase trunk range of motion, in order to improve available range of motion for ADL's.  [x] Progressing  [] MET   [] Not MET  [] Not tested     3.Patient to increase bilateral LE strength by 1/2 grade, in order to improve endurance and increase ability to perform all functional activities for increased time.  [] Progressing  [] MET   [] Not MET  [] Not tested     4.Patient to have pain less than 2/10 at worst, to improve QOL. [x] Progressing  [] MET   [] Not MET  [] Not tested     5.Patient to improve score on the FOTO, to improve QOL. [x] Progressing  [] MET   [] Not MET  [] Not tested     6. Patient to improve score on 5 times sit to stand and single leg balance in order to decrease fall risk. [x] Progressing  [] MET   [] Not MET  [] Not tested        Long Term Goals: In 8 weeks Progress Date   1.Patient to improve score on the FOTO predicted score or better, to improve QOL. [x] Progressing  [] MET   [] Not MET  [] Not tested     2. Patient to increase bilateral LE strength to 4+/5 or greater, in order to improve endurance and increase ability to perform all functional activities for increased time. [x] Progressing  [] MET   [] Not MET  [] Not tested     3. Patient to normalize score on 5 times sit to stand and single leg balance, in order to improve endurance and decrease fall risk. [x] Progressing  [] MET   [] Not MET  [] Not tested     4. Patient to perform daily activities including walking/standing for increased time (with assistive device if indicated) with  only mild increased symptoms. [x] Progressing  [] MET   [] Not MET  [] Not tested          PLAN     Monitor response to today's treatment session and progress with Physical Therapy plan of care as indicated.    Plan of care Certification: 1/25/2024 to 3/35/24.     Outpatient Physical Therapy 2 times weekly for 8 weeks to include any combination of the following interventions: Aquatic Therapy, Gait Training, Manual Therapy, Neuromuscular Re-ed, Patient Education/Self Care, Therapeutic Activites, Therapeutic Exercise, and Moist Hot Pack/Cold Pack    Cee Perdue, PT

## 2024-03-01 NOTE — PLAN OF CARE
[facility]   Physical Therapy Treatment Note & PROGRESS NOTE       Name: Annika Mac  Clinic Number: [id number]    Therapy Diagnosis:   Encounter Diagnoses   Name Primary?    Decreased functional mobility and endurance Yes    Gait abnormality     Balance problem     Decreased strength     Pain      Physician: Evangelist Hamilton FNP    Visit Date: 2/27/2024    Physician Orders: PT Eval and Treat  Medical Diagnosis from Referral: Lumbar radiculopathy; DDD (degenerative disc disease), lumbar; Dorsalgia of lumbar region  Evaluation Date: 1/25/2024  Authorization Period Expiration:12/31/24  Plan of Care Expiration: 3/25/2024  Progress Note Due: 3/25/2024  Visit # / Visits authorized: 6/20 (+1)  FOTO: 2/3 (last performed on 2/27/2024)  PTA Visit #: 0/5     Precautions: Standard, Diabetes, Fall, and PAD, hx of DVT, COPD, asthma, anterolisthesis        Time In: 10:35 am  Time Out: 11:35 am  Total Billable Time: 55 minutes      SUBJECTIVE     Pt reports: she is doing OK today, she was up more then usual doing more cooking and walking this weekend.     She was compliant with home exercise program.  Response to previous treatment: no change  Functional change: walking more    Pain: 4/10 back, 4/10 in right knee  Location:  bilateral back  and down legs to middle of calf     OBJECTIVE     Objective Measures updated at progress report unless specified.        Balance  Right   (seconds) Left  (seconds) Norms 2/27/24  Right/Left   Single Leg Stance 4 5 Less than 4.9 sec high risk  5-6.4 sec increased risk  6.5 or greater low risk 3 sec / 5 sec         Functional Tests  Outcome Norms 2/27/24   Five Time Sit to Stand 22.65 sec 60s: <11.4 sec  70s: <12.6 sec  80s: 14.8 sec  Greater than 14 sec high risk  12.1-14 sec increased risk  12 sec or less low risk 16.62         Range of Motion:        AROM:  Motion: Movement Results: 2/27/24   Multi-Segmental Flexion mid knee NT   Multi-Segmental Extension Defer due due  "anterolisthesis NT   Multi-Segmental Rotation 30% NT   Arms Down Deep Squat defer NT         Strength:     L/E MMT Right  (spine) Left Pain/Dysfunction with Movement 2/27/24  Bilateral    Hip Flexion  3-/5 3-/5   4/5   Knee Extension 4/5 4/5   4+/5   Knee Flexion 4/5 4/5   4+/5   Ankle DF 4+/5 4+/5   NT   Ankle PF 4+/5 4+/5   NT      FOTO:        TREATMENT       Annika received the treatments listed below:         Aninka Mac received aquatic therapy with the use of water to decrease the pull of gravity and weightbearing forces on the body to increase tolerance to resisted therapeutic exercise; including detailed instruction of all therapeutic exercise as well as stepping in and out of the pool with use of handrail and step-to pattern for 55 minutes including:    Exercise 2/27/2024 Parameters   FW/BW walking  x 6 minutes   side-stepping   x 3 minutes each way        Marching, alternating x 3 minutes   Hamstring curls, alternating  x 3 minutes   Hip  flexion x 1 min each bilateral   Hip abduction  x 1 min each bilateral   Hip extension  x 1 min each bilateral        Bicycle FW/BW x 2 minutes fwd sitting on step    Flutter kick  2 minutes    Scissor kicks  2 minutes         Quad stretch on step   2   Hamstring stretch on step   2 x 30"    GSS at wall  2 x 30"         LAQ x  10x/ 3 sets Sitting on 2nd step  (facing the parking lot) feet on 4th step              Stand DL support no hands x 30 sec/ 3 times   Heel/toe raises x 10x/ 3 sets   1/4 squat 2nd step x 10x/ 3 sets    Standing hip circles CW/CCW x 10x each bilateral    Standing figure 8   10x each         UE alt flexion bilateral paddles     UE alt ABD bilateral  paddles     UE alt hugs bilateral  paddles     UE IR/ER bilateral  paddles     UE alt rows bilateral  paddles Unable  Unable to stand without hand support                  FOTO/Re-assess x         PATIENT EDUCATION AND HOME EXERCISES     Home Exercises Provided and Patient Education Provided     Education " provided:   - Importance of strength    Written Home Exercises Provided: Patient instructed to cont prior HEP. Exercises were reviewed and Annika was able to demonstrate them prior to the end of the session.  Annika demonstrated good  understanding of the education provided. See EMR under Patient Instructions for exercises provided during therapy sessions    ASSESSMENT     Patient continues to need consistent cues for form and technique.  She has made good progress with objective measurements, and functional measurements as well. She is reporting slightly less pain today as well. Encouraged home exercise program and continuing to increase overall activity as tolerated. Patient should benefit from continued therapy treatment to address remaining goals.     Annika Is progressing well towards her goals.   Pt prognosis is Fair.     Pt will continue to benefit from skilled outpatient physical therapy to address the deficits listed in the problem list box on initial evaluation, provide pt/family education and to maximize pt's level of independence in the home and community environment.     Pt's spiritual, cultural and educational needs considered and pt agreeable to plan of care and goals.     Anticipated barriers to physical therapy: co-morbidities, sedentary lifestyle, chronicity of condition, lack of understanding of condition, adherence to treatment plan, transportation, coping style, and extensive prior therapy     Goals:   Reviewed: 2/27/2024        Short Term Goals: In 4 weeks  Progress Date   1.Patient to be educated on HEP. [] Progressing  [x] MET   [] Not MET   [] Not tested  2/27/2024   2.Patient to increase trunk range of motion, in order to improve available range of motion for ADL's.  [] Progressing  [] MET   [] Not MET  [x] Not tested  2/27/2024   3.Patient to increase bilateral LE strength by 1/2 grade, in order to improve endurance and increase ability to perform all functional activities for increased time.   [] Progressing  [x] MET   [] Not MET  [] Not tested  2/27/2024   4.Patient to have pain less than 2/10 at worst, to improve QOL. [x] Progressing  [] MET   [] Not MET  [] Not tested  2/27/2024   5.Patient to improve score on the FOTO, to improve QOL. [x] Progressing  [] MET   [] Not MET  [] Not tested  2/27/2024   6. Patient to improve score on 5 times sit to stand and single leg balance in order to decrease fall risk. [x] Progressing  [] MET   [] Not MET  [] Not tested  2/27/2024      Long Term Goals: In 8 weeks Progress Date   1.Patient to improve score on the FOTO predicted score or better, to improve QOL. [x] Progressing  [] MET   [] Not MET  [] Not tested  2/27/2024   2. Patient to increase bilateral LE strength to 4+/5 or greater, in order to improve endurance and increase ability to perform all functional activities for increased time. [x] Progressing  [] MET   [] Not MET  [] Not tested  2/27/2024   3. Patient to normalize score on 5 times sit to stand and single leg balance, in order to improve endurance and decrease fall risk. [x] Progressing  [] MET   [] Not MET  [] Not tested  2/27/2024   4. Patient to perform daily activities including walking/standing for increased time (with assistive device if indicated) with only mild increased symptoms. [x] Progressing  [] MET   [] Not MET  [] Not tested  2/27/2024        PLAN     Monitor response to today's treatment session and progress with Physical Therapy plan of care as indicated.    Plan of care Certification: 1/25/2024 to 3/35/24.     Outpatient Physical Therapy 2 times weekly for 8 weeks to include any combination of the following interventions: Aquatic Therapy, Gait Training, Manual Therapy, Neuromuscular Re-ed, Patient Education/Self Care, Therapeutic Activites, Therapeutic Exercise, and Moist Hot Pack/Cold Pack    Vanda Pascual, PT

## 2024-03-05 ENCOUNTER — LAB VISIT (OUTPATIENT)
Dept: LAB | Facility: HOSPITAL | Age: 74
End: 2024-03-05
Attending: INTERNAL MEDICINE
Payer: MEDICARE

## 2024-03-05 ENCOUNTER — OFFICE VISIT (OUTPATIENT)
Dept: ENDOCRINOLOGY | Facility: CLINIC | Age: 74
End: 2024-03-05
Payer: MEDICARE

## 2024-03-05 ENCOUNTER — OFFICE VISIT (OUTPATIENT)
Dept: OPHTHALMOLOGY | Facility: CLINIC | Age: 74
End: 2024-03-05
Payer: MEDICARE

## 2024-03-05 VITALS
OXYGEN SATURATION: 98 % | DIASTOLIC BLOOD PRESSURE: 62 MMHG | HEART RATE: 71 BPM | SYSTOLIC BLOOD PRESSURE: 140 MMHG | BODY MASS INDEX: 49.4 KG/M2 | WEIGHT: 293 LBS

## 2024-03-05 DIAGNOSIS — E11.43 PERIPHERAL AUTONOMIC NEUROPATHY DUE TO DM: ICD-10-CM

## 2024-03-05 DIAGNOSIS — I12.9 TYPE 2 DIABETES MELLITUS WITH STAGE 3B CHRONIC KIDNEY DISEASE AND HYPERTENSION: ICD-10-CM

## 2024-03-05 DIAGNOSIS — N18.32 TYPE 2 DIABETES MELLITUS WITH STAGE 3B CHRONIC KIDNEY DISEASE AND HYPERTENSION: ICD-10-CM

## 2024-03-05 DIAGNOSIS — E11.3513 TYPE 2 DIABETES MELLITUS WITH BOTH EYES AFFECTED BY PROLIFERATIVE RETINOPATHY AND MACULAR EDEMA, WITH LONG-TERM CURRENT USE OF INSULIN: ICD-10-CM

## 2024-03-05 DIAGNOSIS — Z79.4 TYPE 2 DIABETES MELLITUS WITH BOTH EYES AFFECTED BY PROLIFERATIVE RETINOPATHY AND MACULAR EDEMA, WITH LONG-TERM CURRENT USE OF INSULIN: Primary | ICD-10-CM

## 2024-03-05 DIAGNOSIS — E11.649 HYPOGLYCEMIA UNAWARENESS ASSOCIATED WITH TYPE 2 DIABETES MELLITUS: ICD-10-CM

## 2024-03-05 DIAGNOSIS — E78.5 DYSLIPIDEMIA: ICD-10-CM

## 2024-03-05 DIAGNOSIS — N18.30 ANEMIA IN STAGE 3 CHRONIC KIDNEY DISEASE, UNSPECIFIED WHETHER STAGE 3A OR 3B CKD: ICD-10-CM

## 2024-03-05 DIAGNOSIS — D63.1 ANEMIA IN STAGE 3 CHRONIC KIDNEY DISEASE, UNSPECIFIED WHETHER STAGE 3A OR 3B CKD: ICD-10-CM

## 2024-03-05 DIAGNOSIS — Z79.4 TYPE 2 DIABETES MELLITUS WITH BOTH EYES AFFECTED BY PROLIFERATIVE RETINOPATHY AND MACULAR EDEMA, WITH LONG-TERM CURRENT USE OF INSULIN: ICD-10-CM

## 2024-03-05 DIAGNOSIS — E11.22 TYPE 2 DIABETES MELLITUS WITH STAGE 3B CHRONIC KIDNEY DISEASE AND HYPERTENSION: ICD-10-CM

## 2024-03-05 DIAGNOSIS — H35.373 EPIRETINAL MEMBRANE (ERM) OF BOTH EYES: ICD-10-CM

## 2024-03-05 DIAGNOSIS — E11.42 TYPE 2 DIABETES MELLITUS WITH DIABETIC POLYNEUROPATHY, UNSPECIFIED WHETHER LONG TERM INSULIN USE: ICD-10-CM

## 2024-03-05 DIAGNOSIS — E66.01 MORBID OBESITY: ICD-10-CM

## 2024-03-05 DIAGNOSIS — N06.9 ISOLATED PROTEINURIA WITH MORPHOLOGIC LESION: ICD-10-CM

## 2024-03-05 DIAGNOSIS — E11.3513 TYPE 2 DIABETES MELLITUS WITH BOTH EYES AFFECTED BY PROLIFERATIVE RETINOPATHY AND MACULAR EDEMA, WITH LONG-TERM CURRENT USE OF INSULIN: Primary | ICD-10-CM

## 2024-03-05 DIAGNOSIS — G63 POLYNEUROPATHY ASSOCIATED WITH UNDERLYING DISEASE: ICD-10-CM

## 2024-03-05 DIAGNOSIS — Z79.4 TYPE 2 DIABETES MELLITUS WITH DIABETIC POLYNEUROPATHY, WITH LONG-TERM CURRENT USE OF INSULIN: ICD-10-CM

## 2024-03-05 DIAGNOSIS — N25.81 SECONDARY HYPERPARATHYROIDISM: ICD-10-CM

## 2024-03-05 DIAGNOSIS — E11.43 DIABETIC AUTONOMIC NEUROPATHY ASSOCIATED WITH TYPE 2 DIABETES MELLITUS: ICD-10-CM

## 2024-03-05 DIAGNOSIS — E11.42 TYPE 2 DIABETES MELLITUS WITH DIABETIC POLYNEUROPATHY, WITH LONG-TERM CURRENT USE OF INSULIN: ICD-10-CM

## 2024-03-05 LAB
ALBUMIN SERPL BCP-MCNC: 3.6 G/DL (ref 3.5–5.2)
ALP SERPL-CCNC: 103 U/L (ref 55–135)
ALT SERPL W/O P-5'-P-CCNC: 25 U/L (ref 10–44)
ANION GAP SERPL CALC-SCNC: 9 MMOL/L (ref 8–16)
AST SERPL-CCNC: 19 U/L (ref 10–40)
BILIRUB SERPL-MCNC: 0.2 MG/DL (ref 0.1–1)
BUN SERPL-MCNC: 41 MG/DL (ref 8–23)
CALCIUM SERPL-MCNC: 9.7 MG/DL (ref 8.7–10.5)
CHLORIDE SERPL-SCNC: 111 MMOL/L (ref 95–110)
CHOLEST SERPL-MCNC: 183 MG/DL (ref 120–199)
CHOLEST/HDLC SERPL: 4.3 {RATIO} (ref 2–5)
CO2 SERPL-SCNC: 26 MMOL/L (ref 23–29)
CREAT SERPL-MCNC: 1.9 MG/DL (ref 0.5–1.4)
EST. GFR  (NO RACE VARIABLE): 27.4 ML/MIN/1.73 M^2
ESTIMATED AVG GLUCOSE: 154 MG/DL (ref 68–131)
GLUCOSE SERPL-MCNC: 50 MG/DL (ref 70–110)
HBA1C MFR BLD: 7 % (ref 4–5.6)
HDLC SERPL-MCNC: 43 MG/DL (ref 40–75)
HDLC SERPL: 23.5 % (ref 20–50)
LDLC SERPL CALC-MCNC: 111.2 MG/DL (ref 63–159)
NONHDLC SERPL-MCNC: 140 MG/DL
POTASSIUM SERPL-SCNC: 4.3 MMOL/L (ref 3.5–5.1)
PROT SERPL-MCNC: 6.4 G/DL (ref 6–8.4)
SODIUM SERPL-SCNC: 146 MMOL/L (ref 136–145)
TRIGL SERPL-MCNC: 144 MG/DL (ref 30–150)

## 2024-03-05 PROCEDURE — 92014 COMPRE OPH EXAM EST PT 1/>: CPT | Mod: S$GLB,,, | Performed by: OPHTHALMOLOGY

## 2024-03-05 PROCEDURE — 99214 OFFICE O/P EST MOD 30 MIN: CPT | Mod: 25,S$GLB,, | Performed by: INTERNAL MEDICINE

## 2024-03-05 PROCEDURE — 92134 CPTRZ OPH DX IMG PST SGM RTA: CPT | Mod: S$GLB,,, | Performed by: OPHTHALMOLOGY

## 2024-03-05 PROCEDURE — 36415 COLL VENOUS BLD VENIPUNCTURE: CPT | Performed by: INTERNAL MEDICINE

## 2024-03-05 PROCEDURE — 95251 CONT GLUC MNTR ANALYSIS I&R: CPT | Mod: S$GLB,,, | Performed by: INTERNAL MEDICINE

## 2024-03-05 PROCEDURE — 99999 PR PBB SHADOW E&M-EST. PATIENT-LVL III: CPT | Mod: PBBFAC,,, | Performed by: INTERNAL MEDICINE

## 2024-03-05 PROCEDURE — 80053 COMPREHEN METABOLIC PANEL: CPT | Performed by: INTERNAL MEDICINE

## 2024-03-05 PROCEDURE — 80061 LIPID PANEL: CPT | Performed by: INTERNAL MEDICINE

## 2024-03-05 PROCEDURE — 99999 PR PBB SHADOW E&M-EST. PATIENT-LVL III: CPT | Mod: PBBFAC,,, | Performed by: OPHTHALMOLOGY

## 2024-03-05 PROCEDURE — 83036 HEMOGLOBIN GLYCOSYLATED A1C: CPT | Performed by: INTERNAL MEDICINE

## 2024-03-05 PROCEDURE — 92201 OPSCPY EXTND RTA DRAW UNI/BI: CPT | Mod: S$GLB,,, | Performed by: OPHTHALMOLOGY

## 2024-03-05 RX ORDER — INSULIN DEGLUDEC 100 U/ML
45 INJECTION, SOLUTION SUBCUTANEOUS NIGHTLY
Qty: 45 ML | Refills: 3 | Status: SHIPPED | OUTPATIENT
Start: 2024-03-05

## 2024-03-05 RX ORDER — PREGABALIN 150 MG/1
150 CAPSULE ORAL 2 TIMES DAILY
Qty: 180 CAPSULE | Refills: 1 | Status: SHIPPED | OUTPATIENT
Start: 2024-03-05

## 2024-03-05 RX ORDER — DULAGLUTIDE 4.5 MG/.5ML
INJECTION, SOLUTION SUBCUTANEOUS
Qty: 12 PEN | Refills: 3 | Status: SHIPPED | OUTPATIENT
Start: 2024-03-05

## 2024-03-05 NOTE — PROGRESS NOTES
FOLLOW-UP VISIT    Subjective:      Chief Complaint: Follow-up for diabetes and obesity    HPI: Annika Mca is a 73 y.o. female who is here for a follow-up evaluation for type 2 diabetes    The patient's last visit with me was on 10/24/2023.    Past Medical History:   Diagnosis Date    Asthma     Chronic obstructive pulmonary disease, unspecified COPD type 1/14/2022    Constipation 10/3/2019    Deep vein thrombophlebitis of left leg 7/27/2012    Deep vein thrombosis     when she had a knee replacement    Diabetic foot ulcer with osteomyelitis     Encounter for blood transfusion     anemic     GERD (gastroesophageal reflux disease)     Obesity, diabetes, and hypertension syndrome 2/13/2019    Obstructive sleep apnea 7/27/2012    PAD (peripheral artery disease) 11/21/2013    Pressure ulcer of toe of left foot     Type 2 diabetes mellitus with obesity 8/19/2020    Type 2 diabetes mellitus with peripheral artery disease 8/19/2020           With regards to the diabetes, obesity:    Since our last visit, she had a nerve ablation which somewhat helps with the chronic pain.  Her blood sugars have been doing about the same.    Diabetes history:  Diagnosed: 1987.   Started oral agents then NPH and Regular insulin.   Converted to MDI with Lantus and Novolog in 2/2006 after knee surgery.   D/c TZD due to weight gain 7/08 and added Symlin. Stopped Symlin 2/09.   Januvia added 2/10. D/C Januvia 12/10 r/t cost; resumed though pt assistance in 2016.   Converted to U500 in 7/10.        Known complications:  Peripheral neuropathy  Nephropathy  Toe ulcer with osteomyelitis  Hypoglycemia       Current regimen:  Tresiba 45 units daily  Humalog 13 units TID with meals + moderate  Trulicity 4.5 mg weekly  Jardiance 10 mg daily      Other agents tried:  NPH/R  U-500 insulin  Glargine, detemir, aspart  Januvia  Symlin  Actos    Hypoglycemia:  See above  Knows how to correct with 15 grams of carbs- juice, coke, or glucose tablets.      Denies missed doses.  Has Medicare Extra Help     Eats 3 meals daily, + snacks  Breakfast:  Grits/eggs/sausage georgie  Lunch:  Mashed potatoes, baked fish, corn, salad  Dinner:  Half cup rice, beans, baked chicken and greens.  Drinks:  Water, unsweet tea; Gatorade zero.    No formal exercise. Doing some exercises while seated.        Last visit with Diabetes Educator: : 10/06/2023    She is monitoring her blood glucose by Dexcom G7.  Data over the previous two weeks were downloaded and reviewed.  Interpretation:  Postprandial excursions are noted, but also with occasional lows after dinner especially.  High blood sugar noted around midnight, likely due to late night snacking.  Patient states she eats three peanut butter Ritz crackers along with some milk.  Basal insulin dose looks appropriate.        Diabetes Management Status    Statin: Taking  ACE/ARB: Taking    Screening or Prevention Patient's value Goal Complete/Controlled?   HgA1C Testing and Control   Lab Results   Component Value Date    HGBA1C 7.5 (H) 10/24/2023      Annually/Less than 8% No   Lipid profile : 11/29/2023 Annually Yes   LDL control Lab Results   Component Value Date    LDLCALC 143.0 11/29/2023    Annually/Less than 100 mg/dl  Yes   Nephropathy screening Lab Results   Component Value Date    LABMICR 67.0 01/05/2023     Lab Results   Component Value Date    PROTEINUA Negative 11/13/2019     Lab Results   Component Value Date    UTPCR Unable to calculate 11/13/2019      Annually Yes   Blood pressure BP Readings from Last 1 Encounters:   03/05/24 (!) 140/62    Less than 140/90 Yes   Dilated retinal exam : 02/28/2023 - Dr. Crook Annually Yes   Foot exam   : 01/08/2024 Annually Yes     Lab Results   Component Value Date    MICALBCREAT 35.3 (H) 01/05/2023    MICALBCREAT 76.9 (H) 04/11/2022    MICALBCREAT 270.1 (H) 01/07/2022    MICALBCREAT 8.7 01/04/2021    MICALBCREAT 11.9 05/01/2019         Blood pressure at home running good for the most part.  120-130/60-70s on her most recent log.      Reviewed past medical, family, social history and updated as appropriate.        Objective:     Vitals:    03/05/24 0950   BP: (!) 140/62   Pulse: 71     BP Readings from Last 5 Encounters:   03/05/24 (!) 140/62   01/08/24 (!) 122/59   01/04/24 124/67   12/20/23 (!) 147/73   12/07/23 122/64       Physical Exam  Vitals and nursing note reviewed.   Constitutional:       General: She is not in acute distress.     Appearance: She is well-developed. She is obese.   HENT:      Head: Normocephalic and atraumatic.   Eyes:      General:         Right eye: No discharge.         Left eye: No discharge.      Conjunctiva/sclera: Conjunctivae normal.   Neck:      Thyroid: No thyromegaly.      Trachea: No tracheal deviation.   Cardiovascular:      Rate and Rhythm: Normal rate.   Pulmonary:      Effort: Pulmonary effort is normal. No respiratory distress.   Musculoskeletal:      Comments: No digital clubbing or extremity cyanosis   Neurological:      Mental Status: She is alert and oriented to person, place, and time.      Coordination: Coordination normal.   Psychiatric:         Behavior: Behavior normal.         Wt Readings from Last 50 Encounters:   03/05/24 134.7 kg (296 lb 14.1 oz)   01/08/24 132.3 kg (291 lb 11.4 oz)   01/04/24 132.2 kg (291 lb 8.9 oz)   12/07/23 130.2 kg (287 lb)   12/06/23 131.1 kg (289 lb)   11/27/23 131.2 kg (289 lb 3.9 oz)   11/20/23 130.9 kg (288 lb 9.3 oz)   11/14/23 130.7 kg (288 lb 2.3 oz)   10/24/23 130.7 kg (288 lb 2.3 oz)   10/24/23 130.7 kg (288 lb 4 oz)   09/29/23 130.9 kg (288 lb 9.3 oz)   09/21/23 130.9 kg (288 lb 9.3 oz)   07/27/23 132.3 kg (291 lb 10.7 oz)   06/29/23 131 kg (288 lb 12.8 oz)   06/15/23 131 kg (288 lb 12.8 oz)   06/07/23 130.9 kg (288 lb 9.3 oz)   06/07/23 131 kg (288 lb 12.8 oz)   05/16/23 132 kg (291 lb 0.1 oz)   04/26/23 132 kg (291 lb)   04/20/23 135 kg (297 lb 9.9 oz)   04/20/23 132.5 kg (292 lb 1.8 oz)   03/14/23 133.8 kg (294  lb 15.6 oz)   02/28/23 133.8 kg (294 lb 15.6 oz)   02/23/23 133 kg (293 lb 3.4 oz)   02/06/23 130.7 kg (288 lb 4 oz)   01/12/23 134.3 kg (296 lb)   01/05/23 133.2 kg (293 lb 10.4 oz)   12/12/22 126.6 kg (279 lb)   12/01/22 130 kg (286 lb 9.6 oz)   12/01/22 130.3 kg (287 lb 2.4 oz)   11/30/22 132.3 kg (291 lb 10.7 oz)   11/08/22 128 kg (282 lb 3.2 oz)   11/01/22 135.2 kg (298 lb)   10/14/22 132.8 kg (292 lb 12.3 oz)   10/14/22 134.3 kg (296 lb)   09/14/22 134.7 kg (296 lb 15.4 oz)   09/02/22 132.2 kg (291 lb 5.4 oz)   08/31/22 133.8 kg (295 lb 1.4 oz)   08/23/22 133.7 kg (294 lb 12.1 oz)   07/14/22 133.7 kg (294 lb 11.2 oz)   06/17/22 134.5 kg (296 lb 8.3 oz)   06/13/22 134 kg (295 lb 6.7 oz)   05/19/22 127 kg (280 lb)   05/16/22 127 kg (279 lb 15.8 oz)   05/12/22 127 kg (280 lb)   05/09/22 131 kg (288 lb 11.1 oz)   04/19/22 132.5 kg (292 lb)   04/18/22 132.7 kg (292 lb 8.8 oz)   04/18/22 132.7 kg (292 lb 10.6 oz)   04/11/22 132 kg (291 lb 0.1 oz)       Lab Results   Component Value Date    HGBA1C 7.5 (H) 10/24/2023     Lab Results   Component Value Date    CHOL 218 (H) 11/29/2023    HDL 39 (L) 11/29/2023    LDLCALC 143.0 11/29/2023    TRIG 180 (H) 11/29/2023    CHOLHDL 17.9 (L) 11/29/2023     Lab Results   Component Value Date     11/20/2023    K 4.1 11/20/2023     11/20/2023    CO2 25 11/20/2023     (H) 11/20/2023    BUN 30 (H) 11/20/2023    CREATININE 1.7 (H) 11/20/2023    CALCIUM 9.7 11/20/2023    PROT 6.3 11/20/2023    ALBUMIN 3.4 (L) 11/20/2023    BILITOT 0.2 11/20/2023    ALKPHOS 120 11/20/2023    AST 13 11/20/2023    ALT 12 11/20/2023    ANIONGAP 6 (L) 11/20/2023    ESTGFRAFRICA 29.9 (A) 06/13/2022    EGFRNONAA 26.0 (A) 06/13/2022    TSH 1.365 09/08/2020      Lab Results   Component Value Date    MICALBCREAT 35.3 (H) 01/05/2023       Assessment/Plan:     Type 2 diabetes 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    Currently meeting glycemic target:  Nearly    Patient was recently seen by diabetes education    Medication changes:   Continue:  Tresiba 45 units daily  Trulicity 4.5 mg weekly  Jardiance 10 mg daily  Humalog 13 units three times daily with meals + correction      Reviewed patient's current insulin regimen. Clarified proper insulin dose and timing in relation to meals, etc. Insulin injection sites and proper rotation instructed.      Advised frequent self blood glucose monitoring. Patient encouraged to document glucose results and bring them to every clinic visit.    Hypoglycemia precautions discussed.     Discussed diet and exercise.    Diabetes health maintenance topics are addressed in the HPI    Lab Results   Component Value Date    HGBA1C 7.5 (H) 10/24/2023    HGBA1C 7.7 (H) 01/05/2023    HGBA1C 7.4 (H) 08/24/2022         Type 2 diabetes mellitus with both eyes affected by proliferative retinopathy and macular edema, with long-term current use of insulin  Following with Dr. Crook    Hypoglycemia unawareness associated with type 2 diabetes mellitus  On Dexcom G7.  Rare hypoglycemia noted over the previous two weeks.    Anemia in chronic kidney disease (CKD)  This may artificially lower her A1c.    Isolated proteinuria  Urine protein had previously been normal.  Repeat urine protein was still elevated, although significantly improved.  We will continue to monitor for now.  Continue Jardiance.    Peripheral autonomic neuropathy due to DM  Increase Cymbalta to 60 mg daily.  Continue Lyrica 150 mg b.i.d..    Advised to discuss with her pain management specialist to see if Qutenza would be helpful for her painful diabetic neuropathy.     RTC in 6 months.

## 2024-03-05 NOTE — ASSESSMENT & PLAN NOTE
Increase Cymbalta to 60 mg daily.  Continue Lyrica 150 mg b.i.d..    Advised to discuss with her pain management specialist to see if Qutenza would be helpful for her painful diabetic neuropathy.

## 2024-03-05 NOTE — PROGRESS NOTES
HPI     Diabetic Eye Exam     Additional comments: yearly           Comments    VA stable ou, no pain ou and floaters without flashes ou.    Systane ou prn          Last edited by Juhi Evangelista on 3/5/2024  1:01 PM.            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 Glover       2-PDR OU:  T2 uncontrolled on insulin   -S/P PRP OU   -PRP OS most recently 11/25/13 with new subhyaloid heme then, got Avastin 11/13/13   -Last HbA1c was 7.4   -Emphasized the importance of tight glucose control    3-ERM OU:   -Good VA, monitor    4-PCIOL OU:   -2002 OS by Rosendo   -2012 OD by Dr. Glover        F/U 1 year  OCT

## 2024-03-05 NOTE — ASSESSMENT & PLAN NOTE
Reviewed goals of therapy are to get the best control we can without hypoglycemia.    Currently meeting glycemic target:  Nearly    Patient was recently seen by diabetes education    Medication changes:   Continue:  Tresiba 45 units daily  Trulicity 4.5 mg weekly  Jardiance 10 mg daily  Humalog 13 units three times daily with meals + correction      Reviewed patient's current insulin regimen. Clarified proper insulin dose and timing in relation to meals, etc. Insulin injection sites and proper rotation instructed.      Advised frequent self blood glucose monitoring. Patient encouraged to document glucose results and bring them to every clinic visit.    Hypoglycemia precautions discussed.     Discussed diet and exercise.    Diabetes health maintenance topics are addressed in the HPI    Lab Results   Component Value Date    HGBA1C 7.5 (H) 10/24/2023    HGBA1C 7.7 (H) 01/05/2023    HGBA1C 7.4 (H) 08/24/2022

## 2024-03-06 ENCOUNTER — CLINICAL SUPPORT (OUTPATIENT)
Dept: REHABILITATION | Facility: HOSPITAL | Age: 74
End: 2024-03-06
Payer: MEDICARE

## 2024-03-06 DIAGNOSIS — R53.1 DECREASED STRENGTH: ICD-10-CM

## 2024-03-06 DIAGNOSIS — R26.9 GAIT ABNORMALITY: ICD-10-CM

## 2024-03-06 DIAGNOSIS — Z74.09 DECREASED FUNCTIONAL MOBILITY AND ENDURANCE: Primary | ICD-10-CM

## 2024-03-06 DIAGNOSIS — R52 PAIN: ICD-10-CM

## 2024-03-06 DIAGNOSIS — R26.89 BALANCE PROBLEM: ICD-10-CM

## 2024-03-06 PROCEDURE — 97110 THERAPEUTIC EXERCISES: CPT | Mod: PN,CQ

## 2024-03-06 NOTE — PROGRESS NOTES
[facility]   Physical Therapy Treatment Note & PROGRESS NOTE       Name: Annika Mac  Clinic Number: [id number]    Therapy Diagnosis:   Encounter Diagnoses   Name Primary?    Decreased functional mobility and endurance Yes    Gait abnormality     Balance problem     Decreased strength     Pain      Physician: Evangelist Hamilton FNP    Visit Date: 3/6/2024    Physician Orders: PT Eval and Treat  Medical Diagnosis from Referral: Lumbar radiculopathy; DDD (degenerative disc disease), lumbar; Dorsalgia of lumbar region  Evaluation Date: 1/25/2024  Authorization Period Expiration:12/31/24  Plan of Care Expiration: 3/25/2024  Progress Note Due: 3/25/2024  Visit # / Visits authorized: 7/20 (+1)  FOTO: 2/3 (last performed on 2/27/2024)  PTA Visit #: 1/5     Precautions: Standard, Diabetes, Fall, and PAD, hx of DVT, COPD, asthma, anterolisthesis        Time In: 2:00 am  Time Out: 2:45 am  Total Billable Time: 45 minutes      SUBJECTIVE     Pt reports: feels stiff.     She was compliant with home exercise program.  Response to previous treatment: no change  Functional change: walking more    Pain: 4/10 back, 4/10 in right knee  Location:  bilateral back  and down legs to middle of calf     OBJECTIVE     Objective Measures updated at progress report unless specified.         TREATMENT       Annika received the treatments listed below:        Therapeutic exercises to develop strength, endurance, ROM, flexibility, posture, and core stabilization for 45 minutes including:  NuStep 7 minutes  Ball Squeezes 3'  Hip Abduction red band 3'  Long Arc Quads, 2#, 3'  Seated Marches, 2#, 3'  SL Gastroc Stretch on slant board, 3x1' ea  Heel raises 3 x 10  Step ups on foam pad, 2x10  seated hamstring curl RTB 3 x 10  Seated ankle DF RTB 2 x 10  Sit to stands 2 x 10   Side Steps in parallel bars 5x  Standing marches in // bars, 2x1'  Standing hip abduction in // bars, x1'     Manual therapy techniques: Joint  "mobilizations, Myofacial release, and Soft tissue Mobilization were applied to the: right knee for 0 minutes, including:  +foam rolling to left IT band, glutes, piriformis, and lumbar paraspinals  +Side-lying (L) long leg distraction 2 x 30"     Neuromuscular re-education activities to improve: Balance, Coordination, Proprioception, and Posture for 0 minutes. The following activities were included:  Step Taps 4inch 3x10       PATIENT EDUCATION AND HOME EXERCISES     Home Exercises Provided and Patient Education Provided     Education provided:   - Importance of strength    Written Home Exercises Provided: Patient instructed to cont prior HEP. Exercises were reviewed and Annika was able to demonstrate them prior to the end of the session.  Annika demonstrated good  understanding of the education provided. See EMR under Patient Instructions for exercises provided during therapy sessions    ASSESSMENT     Performed interventions targeting functional strength and ROM for BLE. She was able to perform all interventions without issue.  She required multiple rest breaks throughout session due to fatigue.    Annika Is progressing well towards her goals.   Pt prognosis is Fair.     Pt will continue to benefit from skilled outpatient physical therapy to address the deficits listed in the problem list box on initial evaluation, provide pt/family education and to maximize pt's level of independence in the home and community environment.     Pt's spiritual, cultural and educational needs considered and pt agreeable to plan of care and goals.     Anticipated barriers to physical therapy: co-morbidities, sedentary lifestyle, chronicity of condition, lack of understanding of condition, adherence to treatment plan, transportation, coping style, and extensive prior therapy     Goals:   Reviewed: 3/6/2024        Short Term Goals: In 4 weeks  Progress Date   1.Patient to be educated on HEP. [] Progressing  [x] MET   [] Not MET   [] Not tested "  2/27/2024   2.Patient to increase trunk range of motion, in order to improve available range of motion for ADL's.  [] Progressing  [] MET   [] Not MET  [x] Not tested  2/27/2024   3.Patient to increase bilateral LE strength by 1/2 grade, in order to improve endurance and increase ability to perform all functional activities for increased time.  [] Progressing  [x] MET   [] Not MET  [] Not tested  2/27/2024   4.Patient to have pain less than 2/10 at worst, to improve QOL. [x] Progressing  [] MET   [] Not MET  [] Not tested  2/27/2024   5.Patient to improve score on the FOTO, to improve QOL. [x] Progressing  [] MET   [] Not MET  [] Not tested  2/27/2024   6. Patient to improve score on 5 times sit to stand and single leg balance in order to decrease fall risk. [x] Progressing  [] MET   [] Not MET  [] Not tested  2/27/2024      Long Term Goals: In 8 weeks Progress Date   1.Patient to improve score on the FOTO predicted score or better, to improve QOL. [x] Progressing  [] MET   [] Not MET  [] Not tested  2/27/2024   2. Patient to increase bilateral LE strength to 4+/5 or greater, in order to improve endurance and increase ability to perform all functional activities for increased time. [x] Progressing  [] MET   [] Not MET  [] Not tested  2/27/2024   3. Patient to normalize score on 5 times sit to stand and single leg balance, in order to improve endurance and decrease fall risk. [x] Progressing  [] MET   [] Not MET  [] Not tested  2/27/2024   4. Patient to perform daily activities including walking/standing for increased time (with assistive device if indicated) with only mild increased symptoms. [x] Progressing  [] MET   [] Not MET  [] Not tested  2/27/2024        PLAN     Monitor response to today's treatment session and progress with Physical Therapy plan of care as indicated.    Plan of care Certification: 1/25/2024 to 3/35/24.     Outpatient Physical Therapy 2 times weekly for 8 weeks to include any combination of  the following interventions: Aquatic Therapy, Gait Training, Manual Therapy, Neuromuscular Re-ed, Patient Education/Self Care, Therapeutic Activites, Therapeutic Exercise, and Moist Hot Pack/Cold Pack    Daniel Cerrato PTA

## 2024-03-08 ENCOUNTER — CLINICAL SUPPORT (OUTPATIENT)
Dept: REHABILITATION | Facility: HOSPITAL | Age: 74
End: 2024-03-08
Payer: MEDICARE

## 2024-03-08 DIAGNOSIS — R52 PAIN: ICD-10-CM

## 2024-03-08 DIAGNOSIS — R26.9 GAIT ABNORMALITY: ICD-10-CM

## 2024-03-08 DIAGNOSIS — R26.89 BALANCE PROBLEM: ICD-10-CM

## 2024-03-08 DIAGNOSIS — Z74.09 DECREASED FUNCTIONAL MOBILITY AND ENDURANCE: Primary | ICD-10-CM

## 2024-03-08 DIAGNOSIS — R53.1 DECREASED STRENGTH: ICD-10-CM

## 2024-03-08 PROCEDURE — 97110 THERAPEUTIC EXERCISES: CPT | Mod: PN,CQ

## 2024-03-08 NOTE — PROGRESS NOTES
[facility]   Physical Therapy Treatment Note & PROGRESS NOTE       Name: Annika Mac  Clinic Number: [id number]    Therapy Diagnosis:   Encounter Diagnoses   Name Primary?    Decreased functional mobility and endurance Yes    Gait abnormality     Balance problem     Decreased strength     Pain      Physician: Evangelist Hamilton FNP    Visit Date: 3/8/2024    Physician Orders: PT Eval and Treat  Medical Diagnosis from Referral: Lumbar radiculopathy; DDD (degenerative disc disease), lumbar; Dorsalgia of lumbar region  Evaluation Date: 1/25/2024  Authorization Period Expiration:12/31/24  Plan of Care Expiration: 3/25/2024  Progress Note Due: 3/25/2024  Visit # / Visits authorized: 8/20 (+1)  FOTO: 2/3 (last performed on 2/27/2024)  PTA Visit #: 2/5     Precautions: Standard, Diabetes, Fall, and PAD, hx of DVT, COPD, asthma, anterolisthesis        Time In: 10:00 am  Time Out: 10:45 am  Total Billable Time: 45 minutes      SUBJECTIVE     Pt reports: very sore after last therapy session.     She was compliant with home exercise program.  Response to previous treatment: sore  Functional change: walking more    Pain: 4/10 back, 4/10 in right knee  Location:  bilateral back  and down legs to middle of calf     OBJECTIVE     Objective Measures updated at progress report unless specified.         TREATMENT       Annika received the treatments listed below:        Therapeutic exercises to develop strength, endurance, ROM, flexibility, posture, and core stabilization for 45 minutes including:  NuStep 7 minutes  Ball Squeezes 3'  Hip Abduction red band 3'  Long Arc Quads, 2#, 3'  Seated Marches, 2#, 3'  SL Gastroc Stretch on slant board, 3x1' ea  Heel raises 3 x 10  Step ups on foam pad, 2x10  seated hamstring curl RTB 3 x 10  Seated ankle DF RTB 2 x 10  Sit to stands 2 x 10   Side Steps in parallel bars 5x  Standing marches in // bars, 2x1'  Standing hip abduction in // bars, x1'     Manual therapy  "techniques: Joint mobilizations, Myofacial release, and Soft tissue Mobilization were applied to the: right knee for 0 minutes, including:  +foam rolling to left IT band, glutes, piriformis, and lumbar paraspinals  +Side-lying (L) long leg distraction 2 x 30"     Neuromuscular re-education activities to improve: Balance, Coordination, Proprioception, and Posture for 0 minutes. The following activities were included:  Step Taps 4inch 3x10       PATIENT EDUCATION AND HOME EXERCISES     Home Exercises Provided and Patient Education Provided     Education provided:   - Importance of strength    Written Home Exercises Provided: Patient instructed to cont prior HEP. Exercises were reviewed and Annika was able to demonstrate them prior to the end of the session.  Annika demonstrated good  understanding of the education provided. See EMR under Patient Instructions for exercises provided during therapy sessions    ASSESSMENT     Performed interventions targeting functional strength and ROM for BLE. She was very sore after last therapy session so did not progress any interventions today.    Annika Is progressing well towards her goals.   Pt prognosis is Fair.     Pt will continue to benefit from skilled outpatient physical therapy to address the deficits listed in the problem list box on initial evaluation, provide pt/family education and to maximize pt's level of independence in the home and community environment.     Pt's spiritual, cultural and educational needs considered and pt agreeable to plan of care and goals.     Anticipated barriers to physical therapy: co-morbidities, sedentary lifestyle, chronicity of condition, lack of understanding of condition, adherence to treatment plan, transportation, coping style, and extensive prior therapy     Goals:   Reviewed: 3/8/2024        Short Term Goals: In 4 weeks  Progress Date   1.Patient to be educated on HEP. [] Progressing  [x] MET   [] Not MET   [] Not tested  2/27/2024 "   2.Patient to increase trunk range of motion, in order to improve available range of motion for ADL's.  [] Progressing  [] MET   [] Not MET  [x] Not tested  2/27/2024   3.Patient to increase bilateral LE strength by 1/2 grade, in order to improve endurance and increase ability to perform all functional activities for increased time.  [] Progressing  [x] MET   [] Not MET  [] Not tested  2/27/2024   4.Patient to have pain less than 2/10 at worst, to improve QOL. [x] Progressing  [] MET   [] Not MET  [] Not tested  2/27/2024   5.Patient to improve score on the FOTO, to improve QOL. [x] Progressing  [] MET   [] Not MET  [] Not tested  2/27/2024   6. Patient to improve score on 5 times sit to stand and single leg balance in order to decrease fall risk. [x] Progressing  [] MET   [] Not MET  [] Not tested  2/27/2024      Long Term Goals: In 8 weeks Progress Date   1.Patient to improve score on the FOTO predicted score or better, to improve QOL. [x] Progressing  [] MET   [] Not MET  [] Not tested  2/27/2024   2. Patient to increase bilateral LE strength to 4+/5 or greater, in order to improve endurance and increase ability to perform all functional activities for increased time. [x] Progressing  [] MET   [] Not MET  [] Not tested  2/27/2024   3. Patient to normalize score on 5 times sit to stand and single leg balance, in order to improve endurance and decrease fall risk. [x] Progressing  [] MET   [] Not MET  [] Not tested  2/27/2024   4. Patient to perform daily activities including walking/standing for increased time (with assistive device if indicated) with only mild increased symptoms. [x] Progressing  [] MET   [] Not MET  [] Not tested  2/27/2024        PLAN     Monitor response to today's treatment session and progress with Physical Therapy plan of care as indicated.    Plan of care Certification: 1/25/2024 to 3/35/24.     Outpatient Physical Therapy 2 times weekly for 8 weeks to include any combination of the  following interventions: Aquatic Therapy, Gait Training, Manual Therapy, Neuromuscular Re-ed, Patient Education/Self Care, Therapeutic Activites, Therapeutic Exercise, and Moist Hot Pack/Cold Pack    Daniel Cerrato PTA

## 2024-03-12 ENCOUNTER — CLINICAL SUPPORT (OUTPATIENT)
Dept: REHABILITATION | Facility: HOSPITAL | Age: 74
End: 2024-03-12
Payer: MEDICARE

## 2024-03-12 DIAGNOSIS — R53.1 DECREASED STRENGTH: ICD-10-CM

## 2024-03-12 DIAGNOSIS — R26.9 GAIT ABNORMALITY: ICD-10-CM

## 2024-03-12 DIAGNOSIS — Z74.09 DECREASED FUNCTIONAL MOBILITY AND ENDURANCE: Primary | ICD-10-CM

## 2024-03-12 DIAGNOSIS — R26.89 BALANCE PROBLEM: ICD-10-CM

## 2024-03-12 DIAGNOSIS — R52 PAIN: ICD-10-CM

## 2024-03-12 PROCEDURE — 97110 THERAPEUTIC EXERCISES: CPT | Mod: PN,CQ

## 2024-03-12 PROCEDURE — 97112 NEUROMUSCULAR REEDUCATION: CPT | Mod: PN,CQ

## 2024-03-12 NOTE — PROGRESS NOTES
[facility]   Physical Therapy Treatment Note & PROGRESS NOTE       Name: Annika Mac  Clinic Number: [id number]    Therapy Diagnosis:   Encounter Diagnoses   Name Primary?    Decreased functional mobility and endurance Yes    Gait abnormality     Balance problem     Decreased strength     Pain      Physician: Evangelist Hamilton FNP    Visit Date: 3/12/2024    Physician Orders: PT Eval and Treat  Medical Diagnosis from Referral: Lumbar radiculopathy; DDD (degenerative disc disease), lumbar; Dorsalgia of lumbar region  Evaluation Date: 1/25/2024  Authorization Period Expiration:12/31/24  Plan of Care Expiration: 3/25/2024  Progress Note Due: 3/25/2024  Visit # / Visits authorized: 8/20 (+1)  FOTO: 2/3 (last performed on 2/27/2024)  PTA Visit #: 2/5     Precautions: Standard, Diabetes, Fall, and PAD, hx of DVT, COPD, asthma, anterolisthesis        Time In: 11:00 am  Time Out: 11:45 am  Total Billable Time: 45 minutes      SUBJECTIVE     Pt reports: very sore after last therapy session.     She was compliant with home exercise program.  Response to previous treatment: sore  Functional change: walking more    Pain: 4/10 back, 4/10 in right knee  Location:  bilateral back  and down legs to middle of calf     OBJECTIVE     Objective Measures updated at progress report unless specified.         TREATMENT       Annika received the treatments listed below:        Therapeutic exercises to develop strength, endurance, ROM, flexibility, posture, and core stabilization for 25 minutes including:  NuStep 7 minutes  Ball Squeezes 3'  Hip Abduction GTB 3'  Long Arc Quads, 3#, 3'  Seated Marches, 3#, 3'  SL Gastroc Stretch on slant board, 3x1' ea  Heel raises 3 x 10  seated hamstring curl BTB 3 x 10  F4 LTR, 1x10  SLR, 2x10         Manual therapy techniques: Joint mobilizations, Myofacial release, and Soft tissue Mobilization were applied to the: right knee for 0 minutes, including:  +foam rolling to left IT band,  "glutes, piriformis, and lumbar paraspinals  +Side-lying (L) long leg distraction 2 x 30"     Neuromuscular re-education activities to improve: Balance, Coordination, Proprioception, and Posture for 20 minutes. The following activities were included:  Step Taps 4inch 3x10  Sit to stands 3 x 10   Side Steps in parallel bars 5x  Standing marches in // bars, 2x1'  Standing hip abduction in // bars, x1'  Step ups on foam pad, 2x10  Lateral step ups on foam pad, 2x10  Supine Marches w/ TA contraction, 3x10       PATIENT EDUCATION AND HOME EXERCISES     Home Exercises Provided and Patient Education Provided     Education provided:   - Importance of strength    Written Home Exercises Provided: Patient instructed to cont prior HEP. Exercises were reviewed and Annika was able to demonstrate them prior to the end of the session.  Annika demonstrated good  understanding of the education provided. See EMR under Patient Instructions for exercises provided during therapy sessions    ASSESSMENT     Performed interventions targeting functional strength and ROM for BLE. She was able to perform all interventions without issue.    Annika Is progressing well towards her goals.   Pt prognosis is Fair.     Pt will continue to benefit from skilled outpatient physical therapy to address the deficits listed in the problem list box on initial evaluation, provide pt/family education and to maximize pt's level of independence in the home and community environment.     Pt's spiritual, cultural and educational needs considered and pt agreeable to plan of care and goals.     Anticipated barriers to physical therapy: co-morbidities, sedentary lifestyle, chronicity of condition, lack of understanding of condition, adherence to treatment plan, transportation, coping style, and extensive prior therapy     Goals:   Reviewed: 3/12/2024        Short Term Goals: In 4 weeks  Progress Date   1.Patient to be educated on HEP. [] Progressing  [x] MET   [] Not MET "   [] Not tested  2/27/2024   2.Patient to increase trunk range of motion, in order to improve available range of motion for ADL's.  [] Progressing  [] MET   [] Not MET  [x] Not tested  2/27/2024   3.Patient to increase bilateral LE strength by 1/2 grade, in order to improve endurance and increase ability to perform all functional activities for increased time.  [] Progressing  [x] MET   [] Not MET  [] Not tested  2/27/2024   4.Patient to have pain less than 2/10 at worst, to improve QOL. [x] Progressing  [] MET   [] Not MET  [] Not tested  2/27/2024   5.Patient to improve score on the FOTO, to improve QOL. [x] Progressing  [] MET   [] Not MET  [] Not tested  2/27/2024   6. Patient to improve score on 5 times sit to stand and single leg balance in order to decrease fall risk. [x] Progressing  [] MET   [] Not MET  [] Not tested  2/27/2024      Long Term Goals: In 8 weeks Progress Date   1.Patient to improve score on the FOTO predicted score or better, to improve QOL. [x] Progressing  [] MET   [] Not MET  [] Not tested  2/27/2024   2. Patient to increase bilateral LE strength to 4+/5 or greater, in order to improve endurance and increase ability to perform all functional activities for increased time. [x] Progressing  [] MET   [] Not MET  [] Not tested  2/27/2024   3. Patient to normalize score on 5 times sit to stand and single leg balance, in order to improve endurance and decrease fall risk. [x] Progressing  [] MET   [] Not MET  [] Not tested  2/27/2024   4. Patient to perform daily activities including walking/standing for increased time (with assistive device if indicated) with only mild increased symptoms. [x] Progressing  [] MET   [] Not MET  [] Not tested  2/27/2024        PLAN     Monitor response to today's treatment session and progress with Physical Therapy plan of care as indicated.    Plan of care Certification: 1/25/2024 to 3/35/24.     Outpatient Physical Therapy 2 times weekly for 8 weeks to include  any combination of the following interventions: Aquatic Therapy, Gait Training, Manual Therapy, Neuromuscular Re-ed, Patient Education/Self Care, Therapeutic Activites, Therapeutic Exercise, and Moist Hot Pack/Cold Pack    Daniel Cerrato PTA

## 2024-03-14 ENCOUNTER — CLINICAL SUPPORT (OUTPATIENT)
Dept: REHABILITATION | Facility: HOSPITAL | Age: 74
End: 2024-03-14
Payer: MEDICARE

## 2024-03-14 DIAGNOSIS — R26.9 GAIT ABNORMALITY: ICD-10-CM

## 2024-03-14 DIAGNOSIS — R26.89 BALANCE PROBLEM: ICD-10-CM

## 2024-03-14 DIAGNOSIS — R53.1 DECREASED STRENGTH: ICD-10-CM

## 2024-03-14 DIAGNOSIS — Z74.09 DECREASED FUNCTIONAL MOBILITY AND ENDURANCE: Primary | ICD-10-CM

## 2024-03-14 DIAGNOSIS — R52 PAIN: ICD-10-CM

## 2024-03-14 PROCEDURE — 97112 NEUROMUSCULAR REEDUCATION: CPT | Mod: PN,CQ

## 2024-03-14 PROCEDURE — 97110 THERAPEUTIC EXERCISES: CPT | Mod: PN,CQ

## 2024-03-14 NOTE — PROGRESS NOTES
[facility]   Physical Therapy Treatment Note & PROGRESS NOTE       Name: Annika Mac  Clinic Number: [id number]    Therapy Diagnosis:   Encounter Diagnoses   Name Primary?    Decreased functional mobility and endurance Yes    Gait abnormality     Balance problem     Decreased strength     Pain      Physician: Evangelist Hamilton FNP    Visit Date: 3/14/2024    Physician Orders: PT Eval and Treat  Medical Diagnosis from Referral: Lumbar radiculopathy; DDD (degenerative disc disease), lumbar; Dorsalgia of lumbar region  Evaluation Date: 1/25/2024  Authorization Period Expiration:12/31/24  Plan of Care Expiration: 3/25/2024  Progress Note Due: 3/25/2024  Visit # / Visits authorized: 10/20 (+1)  FOTO: 2/3 (last performed on 2/27/2024)    PTA Visit #: 4/5     Precautions: Standard, Diabetes, Fall, and PAD, hx of DVT, COPD, asthma, anterolisthesis        Time In: 10:00 am  Time Out: 10:50 am  Total Billable Time: 50 minutes      SUBJECTIVE     Pt reports: was sore after last therapy session but is better today.     She was compliant with home exercise program.  Response to previous treatment: sore  Functional change:    Pain: 4/10 back, 4/10 in right knee  Location:  bilateral back  and down legs to middle of calf     OBJECTIVE     Objective Measures updated at progress report unless specified.         TREATMENT       Annika received the treatments listed below:        Therapeutic exercises to develop strength, endurance, ROM, flexibility, posture, and core stabilization for 30 minutes including:  NuStep 7 minutes  Ball Squeezes 3'  Hip Abduction GTB 3'  Long Arc Quads, 4#, 3'  Seated Marches, 4#, 3'  SL Gastroc Stretch on slant board, 3x1' ea  Heel raises 3 x 15  seated hamstring curl BTB 3 x 10  F4 LTR, 1x10  SLR, 2x10         Manual therapy techniques: Joint mobilizations, Myofacial release, and Soft tissue Mobilization were applied to the: right knee for 0 minutes, including:  +foam rolling to left  "IT band, glutes, piriformis, and lumbar paraspinals  +Side-lying (L) long leg distraction 2 x 30"     Neuromuscular re-education activities to improve: Balance, Coordination, Proprioception, and Posture for 20 minutes. The following activities were included:  Step Taps 4inch 3x10  Sit to stands 3 x 10   Side Steps in parallel bars 5x  Standing marches in // bars, 3x10  Standing hip abduction in // bars, 2x10  Step ups on 4 inch step, 2x10  Lateral step ups on 4 inch step, 2x10  Supine Marches w/ TA contraction, 3x10       PATIENT EDUCATION AND HOME EXERCISES     Home Exercises Provided and Patient Education Provided     Education provided:   - Importance of strength    Written Home Exercises Provided: Patient instructed to cont prior HEP. Exercises were reviewed and Annika was able to demonstrate them prior to the end of the session.  Annika demonstrated good  understanding of the education provided. See EMR under Patient Instructions for exercises provided during therapy sessions    ASSESSMENT     Performed interventions targeting functional strength and ROM for BLE. Continued to make gradual progressions with increased resistance. She was able to perform all interventions without issue.    Annika Is progressing well towards her goals.   Pt prognosis is Fair.     Pt will continue to benefit from skilled outpatient physical therapy to address the deficits listed in the problem list box on initial evaluation, provide pt/family education and to maximize pt's level of independence in the home and community environment.     Pt's spiritual, cultural and educational needs considered and pt agreeable to plan of care and goals.     Anticipated barriers to physical therapy: co-morbidities, sedentary lifestyle, chronicity of condition, lack of understanding of condition, adherence to treatment plan, transportation, coping style, and extensive prior therapy     Goals:   Reviewed: 3/14/2024        Short Term Goals: In 4 weeks  " Progress Date   1.Patient to be educated on HEP. [] Progressing  [x] MET   [] Not MET   [] Not tested  2/27/2024   2.Patient to increase trunk range of motion, in order to improve available range of motion for ADL's.  [] Progressing  [] MET   [] Not MET  [x] Not tested  2/27/2024   3.Patient to increase bilateral LE strength by 1/2 grade, in order to improve endurance and increase ability to perform all functional activities for increased time.  [] Progressing  [x] MET   [] Not MET  [] Not tested  2/27/2024   4.Patient to have pain less than 2/10 at worst, to improve QOL. [x] Progressing  [] MET   [] Not MET  [] Not tested  2/27/2024   5.Patient to improve score on the FOTO, to improve QOL. [x] Progressing  [] MET   [] Not MET  [] Not tested  2/27/2024   6. Patient to improve score on 5 times sit to stand and single leg balance in order to decrease fall risk. [x] Progressing  [] MET   [] Not MET  [] Not tested  2/27/2024      Long Term Goals: In 8 weeks Progress Date   1.Patient to improve score on the FOTO predicted score or better, to improve QOL. [x] Progressing  [] MET   [] Not MET  [] Not tested  2/27/2024   2. Patient to increase bilateral LE strength to 4+/5 or greater, in order to improve endurance and increase ability to perform all functional activities for increased time. [x] Progressing  [] MET   [] Not MET  [] Not tested  2/27/2024   3. Patient to normalize score on 5 times sit to stand and single leg balance, in order to improve endurance and decrease fall risk. [x] Progressing  [] MET   [] Not MET  [] Not tested  2/27/2024   4. Patient to perform daily activities including walking/standing for increased time (with assistive device if indicated) with only mild increased symptoms. [x] Progressing  [] MET   [] Not MET  [] Not tested  2/27/2024        PLAN     Monitor response to today's treatment session and progress with Physical Therapy plan of care as indicated.    Plan of care Certification:  1/25/2024 to 3/35/24.     Outpatient Physical Therapy 2 times weekly for 8 weeks to include any combination of the following interventions: Aquatic Therapy, Gait Training, Manual Therapy, Neuromuscular Re-ed, Patient Education/Self Care, Therapeutic Activites, Therapeutic Exercise, and Moist Hot Pack/Cold Pack    Daniel Cerrato PTA

## 2024-03-15 ENCOUNTER — PATIENT MESSAGE (OUTPATIENT)
Dept: ENDOCRINOLOGY | Facility: CLINIC | Age: 74
End: 2024-03-15
Payer: MEDICARE

## 2024-03-18 ENCOUNTER — CLINICAL SUPPORT (OUTPATIENT)
Dept: REHABILITATION | Facility: HOSPITAL | Age: 74
End: 2024-03-18
Payer: MEDICARE

## 2024-03-18 DIAGNOSIS — R52 PAIN: ICD-10-CM

## 2024-03-18 DIAGNOSIS — Z74.09 DECREASED FUNCTIONAL MOBILITY AND ENDURANCE: Primary | ICD-10-CM

## 2024-03-18 DIAGNOSIS — R26.9 GAIT ABNORMALITY: ICD-10-CM

## 2024-03-18 DIAGNOSIS — R26.89 BALANCE PROBLEM: ICD-10-CM

## 2024-03-18 DIAGNOSIS — R53.1 DECREASED STRENGTH: ICD-10-CM

## 2024-03-18 PROCEDURE — 97110 THERAPEUTIC EXERCISES: CPT | Mod: PN,CQ

## 2024-03-18 PROCEDURE — 97112 NEUROMUSCULAR REEDUCATION: CPT | Mod: PN,CQ

## 2024-03-18 NOTE — PROGRESS NOTES
"[facility]   Physical Therapy Treatment Note & PROGRESS NOTE       Name: Annika Mac  Clinic Number: [id number]    Therapy Diagnosis:   Encounter Diagnoses   Name Primary?    Decreased functional mobility and endurance Yes    Gait abnormality     Balance problem     Decreased strength     Pain      Physician: Evangelist Hamilton FNP    Visit Date: 3/18/2024    Physician Orders: PT Eval and Treat  Medical Diagnosis from Referral: Lumbar radiculopathy; DDD (degenerative disc disease), lumbar; Dorsalgia of lumbar region  Evaluation Date: 1/25/2024  Authorization Period Expiration:12/31/24  Plan of Care Expiration: 3/25/2024  Progress Note Due: 3/25/2024  Visit # / Visits authorized: 10/20 (+1)  FOTO: 2/3 (last performed on 2/27/2024)    PTA Visit #: 4/5     Precautions: Standard, Diabetes, Fall, and PAD, hx of DVT, COPD, asthma, anterolisthesis        Time In: 10:30 am  Time Out: 11:30 am  Total Billable Time: 55 minutes      SUBJECTIVE     Pt reports: knee hurt a little bit.     She was compliant with home exercise program.  Response to previous treatment: sore  Functional change:    Pain: 4/10 back, 4/10 in right knee  Location:  bilateral back  and down legs to middle of calf     OBJECTIVE     Objective Measures updated at progress report unless specified.         TREATMENT       Annika received the treatments listed below:        Therapeutic exercises to develop strength, endurance, ROM, flexibility, posture, and core stabilization for 35 minutes including:  NuStep 7 minutes  Ball Squeezes 3'  Hip Abduction GTB 3'  Long Arc Quads, 4#, 3'  Seated lumbar flexion stretch, 3x10  Seated Marches, 4#, 3'  SL Gastroc Stretch on slant board, 3x1' ea  Heel raises 3 x 15  Seated F4 stretch, 3x45"  seated hamstring curl BTB 3 x 10  F4 LTR, 1x10  SLR, 2x10         Manual therapy techniques: Joint mobilizations, Myofacial release, and Soft tissue Mobilization were applied to the: right knee for 0 minutes, " "including:  +foam rolling to left IT band, glutes, piriformis, and lumbar paraspinals  +Side-lying (L) long leg distraction 2 x 30"     Neuromuscular re-education activities to improve: Balance, Coordination, Proprioception, and Posture for 20 minutes. The following activities were included:  Sit to stands 3 x 10   Side Steps in parallel bars 5x  Standing marches in // bars, 3x10  Standing hip abduction in // bars, 2x10  Step ups on 6 inch step, 2x10  Lateral step ups on 6 inch step, 2x10  Supine Marches w/ TA contraction, 3x10       PATIENT EDUCATION AND HOME EXERCISES     Home Exercises Provided and Patient Education Provided     Education provided:   - Importance of strength    Written Home Exercises Provided: Patient instructed to cont prior HEP. Exercises were reviewed and Annika was able to demonstrate them prior to the end of the session.  Annika demonstrated good  understanding of the education provided. See EMR under Patient Instructions for exercises provided during therapy sessions    ASSESSMENT     Performed interventions targeting functional strength and ROM for BLE. Was able to progress in a few interventions without issue. Needed cuing on sit to stands for better form.     Annika Is progressing well towards her goals.   Pt prognosis is Fair.     Pt will continue to benefit from skilled outpatient physical therapy to address the deficits listed in the problem list box on initial evaluation, provide pt/family education and to maximize pt's level of independence in the home and community environment.     Pt's spiritual, cultural and educational needs considered and pt agreeable to plan of care and goals.     Anticipated barriers to physical therapy: co-morbidities, sedentary lifestyle, chronicity of condition, lack of understanding of condition, adherence to treatment plan, transportation, coping style, and extensive prior therapy     Goals:       Short Term Goals: In 4 weeks  Progress Date   1.Patient to be " educated on HEP. [] Progressing  [x] MET   [] Not MET   [] Not tested  2/27/2024   2.Patient to increase trunk range of motion, in order to improve available range of motion for ADL's.  [] Progressing  [] MET   [] Not MET  [x] Not tested  2/27/2024   3.Patient to increase bilateral LE strength by 1/2 grade, in order to improve endurance and increase ability to perform all functional activities for increased time.  [] Progressing  [x] MET   [] Not MET  [] Not tested  2/27/2024   4.Patient to have pain less than 2/10 at worst, to improve QOL. [x] Progressing  [] MET   [] Not MET  [] Not tested  2/27/2024   5.Patient to improve score on the FOTO, to improve QOL. [x] Progressing  [] MET   [] Not MET  [] Not tested  2/27/2024   6. Patient to improve score on 5 times sit to stand and single leg balance in order to decrease fall risk. [x] Progressing  [] MET   [] Not MET  [] Not tested  2/27/2024      Long Term Goals: In 8 weeks Progress Date   1.Patient to improve score on the FOTO predicted score or better, to improve QOL. [x] Progressing  [] MET   [] Not MET  [] Not tested  2/27/2024   2. Patient to increase bilateral LE strength to 4+/5 or greater, in order to improve endurance and increase ability to perform all functional activities for increased time. [x] Progressing  [] MET   [] Not MET  [] Not tested  2/27/2024   3. Patient to normalize score on 5 times sit to stand and single leg balance, in order to improve endurance and decrease fall risk. [x] Progressing  [] MET   [] Not MET  [] Not tested  2/27/2024   4. Patient to perform daily activities including walking/standing for increased time (with assistive device if indicated) with only mild increased symptoms. [x] Progressing  [] MET   [] Not MET  [] Not tested  2/27/2024        PLAN     Monitor response to today's treatment session and progress with Physical Therapy plan of care as indicated.    Plan of care Certification: 1/25/2024 to 3/35/24.     Outpatient  Physical Therapy 2 times weekly for 8 weeks to include any combination of the following interventions: Aquatic Therapy, Gait Training, Manual Therapy, Neuromuscular Re-ed, Patient Education/Self Care, Therapeutic Activites, Therapeutic Exercise, and Moist Hot Pack/Cold Pack    Daniel Cerrato PTA

## 2024-03-21 ENCOUNTER — CLINICAL SUPPORT (OUTPATIENT)
Dept: REHABILITATION | Facility: HOSPITAL | Age: 74
End: 2024-03-21
Payer: MEDICARE

## 2024-03-21 DIAGNOSIS — Z74.09 DECREASED FUNCTIONAL MOBILITY AND ENDURANCE: Primary | ICD-10-CM

## 2024-03-21 DIAGNOSIS — R26.9 GAIT ABNORMALITY: ICD-10-CM

## 2024-03-21 DIAGNOSIS — R26.89 BALANCE PROBLEM: ICD-10-CM

## 2024-03-21 DIAGNOSIS — R52 PAIN: ICD-10-CM

## 2024-03-21 DIAGNOSIS — R53.1 DECREASED STRENGTH: ICD-10-CM

## 2024-03-21 PROCEDURE — 97112 NEUROMUSCULAR REEDUCATION: CPT | Mod: PN

## 2024-03-21 PROCEDURE — 97110 THERAPEUTIC EXERCISES: CPT | Mod: PN

## 2024-03-21 NOTE — PROGRESS NOTES
"[facility]   Physical Therapy Treatment Note & PROGRESS NOTE       Name: Annika Mac  Clinic Number: [id number]    Therapy Diagnosis:   Encounter Diagnoses   Name Primary?    Decreased functional mobility and endurance Yes    Gait abnormality     Balance problem     Decreased strength     Pain      Physician: Evangelist Hamilton FNP    Visit Date: 3/21/2024    Physician Orders: PT Eval and Treat  Medical Diagnosis from Referral: Lumbar radiculopathy; DDD (degenerative disc disease), lumbar; Dorsalgia of lumbar region  Evaluation Date: 1/25/2024  Authorization Period Expiration:12/31/24  Plan of Care Expiration: 3/25/2024  Progress Note Due: 3/25/2024  Visit # / Visits authorized: 12/20 (+1)  FOTO: 2/3 (last performed on 2/27/2024)    PTA Visit #: 0/5     Precautions: Standard, Diabetes, Fall, and PAD, hx of DVT, COPD, asthma, anterolisthesis      Time In: 12:30 PM  Time Out: 1:30 PM  Total Billable Time: 60 minutes (billed 30 min 1:1)     SUBJECTIVE     Pt reports: she is feeling good today.     She was compliant with home exercise program.  Response to previous treatment: sore  Functional change:    Pain: 4/10 back, 4/10 in right knee  Location:  bilateral back  and down legs to middle of calf     OBJECTIVE     Objective Measures updated at progress report unless specified.         TREATMENT   Annika received the treatments listed below:    (exercises performed today are bolded)    Therapeutic exercises to develop strength, endurance, ROM, flexibility, posture, and core stabilization for 35 minutes including:  NuStep for increased LE ROM, strength and endurance x7 minutes  Seated lumbar flexion stretch, 3x10  Ball Squeezes 3'  Hip Abduction GTB 3'  Long Arc Quads, 4#, 3'  Seated Marches, 4#, 3'  Seated hamstring curl BTB 3 x 10  SL Gastroc Stretch on slant board, 3x1' ea  Heel raises 3 x 15  Seated F4 stretch, 3x45"    Manual therapy techniques: Joint mobilizations, Myofacial release, and Soft " "tissue Mobilization were applied to the: right knee for 0 minutes, including:  +foam rolling to left IT band, glutes, piriformis, and lumbar paraspinals  +Side-lying (L) long leg distraction 2 x 30"     Neuromuscular re-education activities to improve: Balance, Coordination, Proprioception, and Posture for 20 minutes. The following activities were included:  Sit to stands 3 x 10   Side Steps in parallel bars 5x  Standing marches in // bars, 3x10  Standing hip abduction in // bars, 2x10  Step ups on 6 inch step, 2x10  Lateral step ups on 6 inch step, 2x10  Supine Marches w/ TA contraction, 3x10       PATIENT EDUCATION AND HOME EXERCISES     Home Exercises Provided and Patient Education Provided     Education provided:   - Importance of strength    Written Home Exercises Provided: Patient instructed to cont prior HEP. Exercises were reviewed and Annika was able to demonstrate them prior to the end of the session.  Annika demonstrated good  understanding of the education provided. See EMR under Patient Instructions for exercises provided during therapy sessions    ASSESSMENT   Patient tolerated therapy session good today. Interventions targeted functional strength and ROM for BLE. She has increased ease with sit to stands today with foam on top of chair. During standing activities, her blood sugar dropped to 67mg/dl. She was sat down with her feet elevated. She was provided with one glucose tablet, two apple juices, and 4 mints. Her blood glucose michael to 103 mg/dl before she left where her  drove her home.     Annika Is progressing well towards her goals.   Pt prognosis is Fair.     Pt will continue to benefit from skilled outpatient physical therapy to address the deficits listed in the problem list box on initial evaluation, provide pt/family education and to maximize pt's level of independence in the home and community environment.     Pt's spiritual, cultural and educational needs considered and pt agreeable to " plan of care and goals.     Anticipated barriers to physical therapy: co-morbidities, sedentary lifestyle, chronicity of condition, lack of understanding of condition, adherence to treatment plan, transportation, coping style, and extensive prior therapy     Goals:       Short Term Goals: In 4 weeks  Progress Date   1.Patient to be educated on HEP. [] Progressing  [x] MET   [] Not MET   [] Not tested  2/27/2024   2.Patient to increase trunk range of motion, in order to improve available range of motion for ADL's.  [] Progressing  [] MET   [] Not MET  [x] Not tested  2/27/2024   3.Patient to increase bilateral LE strength by 1/2 grade, in order to improve endurance and increase ability to perform all functional activities for increased time.  [] Progressing  [x] MET   [] Not MET  [] Not tested  2/27/2024   4.Patient to have pain less than 2/10 at worst, to improve QOL. [x] Progressing  [] MET   [] Not MET  [] Not tested  2/27/2024   5.Patient to improve score on the FOTO, to improve QOL. [x] Progressing  [] MET   [] Not MET  [] Not tested  2/27/2024   6. Patient to improve score on 5 times sit to stand and single leg balance in order to decrease fall risk. [x] Progressing  [] MET   [] Not MET  [] Not tested  2/27/2024      Long Term Goals: In 8 weeks Progress Date   1.Patient to improve score on the FOTO predicted score or better, to improve QOL. [x] Progressing  [] MET   [] Not MET  [] Not tested  2/27/2024   2. Patient to increase bilateral LE strength to 4+/5 or greater, in order to improve endurance and increase ability to perform all functional activities for increased time. [x] Progressing  [] MET   [] Not MET  [] Not tested  2/27/2024   3. Patient to normalize score on 5 times sit to stand and single leg balance, in order to improve endurance and decrease fall risk. [x] Progressing  [] MET   [] Not MET  [] Not tested  2/27/2024   4. Patient to perform daily activities including walking/standing for increased  time (with assistive device if indicated) with only mild increased symptoms. [x] Progressing  [] MET   [] Not MET  [] Not tested  2/27/2024        PLAN     Monitor response to today's treatment session and progress with Physical Therapy plan of care as indicated.    Plan of care Certification: 1/25/2024 to 3/35/24.     Outpatient Physical Therapy 2 times weekly for 8 weeks to include any combination of the following interventions: Aquatic Therapy, Gait Training, Manual Therapy, Neuromuscular Re-ed, Patient Education/Self Care, Therapeutic Activites, Therapeutic Exercise, and Moist Hot Pack/Cold Pack    Ashley Pressley, PT,DPT  3/21/2024

## 2024-03-26 ENCOUNTER — CLINICAL SUPPORT (OUTPATIENT)
Dept: REHABILITATION | Facility: HOSPITAL | Age: 74
End: 2024-03-26
Payer: MEDICARE

## 2024-03-26 DIAGNOSIS — R53.1 DECREASED STRENGTH: ICD-10-CM

## 2024-03-26 DIAGNOSIS — R52 PAIN: ICD-10-CM

## 2024-03-26 DIAGNOSIS — R26.9 GAIT ABNORMALITY: ICD-10-CM

## 2024-03-26 DIAGNOSIS — R26.89 BALANCE PROBLEM: ICD-10-CM

## 2024-03-26 DIAGNOSIS — Z74.09 DECREASED FUNCTIONAL MOBILITY AND ENDURANCE: Primary | ICD-10-CM

## 2024-03-26 PROCEDURE — 97110 THERAPEUTIC EXERCISES: CPT | Mod: PN,CQ

## 2024-03-26 PROCEDURE — 97112 NEUROMUSCULAR REEDUCATION: CPT | Mod: PN,CQ

## 2024-03-26 NOTE — PROGRESS NOTES
"[facility]   Physical Therapy Treatment Note & PROGRESS NOTE       Name: Annika Mac  Clinic Number: [id number]    Therapy Diagnosis:   Encounter Diagnoses   Name Primary?    Decreased functional mobility and endurance Yes    Gait abnormality     Balance problem     Decreased strength     Pain      Physician: Evangelist Hamilton FNP    Visit Date: 3/26/2024    Physician Orders: PT Eval and Treat  Medical Diagnosis from Referral: Lumbar radiculopathy; DDD (degenerative disc disease), lumbar; Dorsalgia of lumbar region  Evaluation Date: 1/25/2024  Authorization Period Expiration:12/31/24  Plan of Care Expiration: 3/25/2024  Progress Note Due: 3/25/2024  Visit # / Visits authorized: 13/20 (+1)  FOTO: 2/3 (last performed on 2/27/2024)    PTA Visit #: 1/5     Precautions: Standard, Diabetes, Fall, and PAD, hx of DVT, COPD, asthma, anterolisthesis      Time In: 11:00 AM  Time Out: 12:00 PM  Total Billable Time: 55 minutes    SUBJECTIVE     Pt reports: she is feeling good today.     She was compliant with home exercise program.  Response to previous treatment: sore  Functional change:    Pain: 4/10 back, 4/10 in right knee  Location:  bilateral back  and down legs to middle of calf     OBJECTIVE     Objective Measures updated at progress report unless specified.         TREATMENT   Annika received the treatments listed below:    (exercises performed today are bolded)    Therapeutic exercises to develop strength, endurance, ROM, flexibility, posture, and core stabilization for 35 minutes including:  NuStep for increased LE ROM, strength and endurance x7 minutes  Seated lumbar flexion stretch, 3x10  DKTC w/ SB, 3x10  Ball Squeezes 3'  Hip Abduction BTB 3'  Long Arc Quads, 5#, 3'  Seated Marches, 5#, 3'  Seated hamstring curl BTB 3 x 10  SL Gastroc Stretch on slant board, 3x1' ea  Heel raises 3 x 15  Seated F4 stretch, 3x45"  SLR, 3x10    Manual therapy techniques: Joint mobilizations, Myofacial release, " "and Soft tissue Mobilization were applied to the: right knee for 0 minutes, including:  +foam rolling to left IT band, glutes, piriformis, and lumbar paraspinals  +Side-lying (L) long leg distraction 2 x 30"     Neuromuscular re-education activities to improve: Balance, Coordination, Proprioception, and Posture for 20 minutes. The following activities were included:  Sit to stands 3 x 10   Side Steps in parallel bars 5x  Standing marches in // bars, 3x10  Standing hip abduction in // bars, 2x10  Deadbugs, feet down, 1x10  Step ups on 6 inch step, 2x10  Lateral step ups on 6 inch step, 2x10  Supine Marches w/ TA contraction, 3x10       PATIENT EDUCATION AND HOME EXERCISES     Home Exercises Provided and Patient Education Provided     Education provided:   - Importance of strength    Written Home Exercises Provided: Patient instructed to cont prior HEP. Exercises were reviewed and Annika was able to demonstrate them prior to the end of the session.  Annika demonstrated good  understanding of the education provided. See EMR under Patient Instructions for exercises provided during therapy sessions    ASSESSMENT   Patient tolerated therapy session good today. Interventions targeted functional strength and ROM for BLE and core. Strength seems to be improving as she was able to progress in several interventions with increased resistance.     Annika Is progressing well towards her goals.   Pt prognosis is Fair.     Pt will continue to benefit from skilled outpatient physical therapy to address the deficits listed in the problem list box on initial evaluation, provide pt/family education and to maximize pt's level of independence in the home and community environment.     Pt's spiritual, cultural and educational needs considered and pt agreeable to plan of care and goals.     Anticipated barriers to physical therapy: co-morbidities, sedentary lifestyle, chronicity of condition, lack of understanding of condition, adherence to " treatment plan, transportation, coping style, and extensive prior therapy     Goals:       Short Term Goals: In 4 weeks  Progress Date   1.Patient to be educated on HEP. [] Progressing  [x] MET   [] Not MET   [] Not tested  2/27/2024   2.Patient to increase trunk range of motion, in order to improve available range of motion for ADL's.  [] Progressing  [] MET   [] Not MET  [x] Not tested  2/27/2024   3.Patient to increase bilateral LE strength by 1/2 grade, in order to improve endurance and increase ability to perform all functional activities for increased time.  [] Progressing  [x] MET   [] Not MET  [] Not tested  2/27/2024   4.Patient to have pain less than 2/10 at worst, to improve QOL. [x] Progressing  [] MET   [] Not MET  [] Not tested  2/27/2024   5.Patient to improve score on the FOTO, to improve QOL. [x] Progressing  [] MET   [] Not MET  [] Not tested  2/27/2024   6. Patient to improve score on 5 times sit to stand and single leg balance in order to decrease fall risk. [x] Progressing  [] MET   [] Not MET  [] Not tested  2/27/2024      Long Term Goals: In 8 weeks Progress Date   1.Patient to improve score on the FOTO predicted score or better, to improve QOL. [x] Progressing  [] MET   [] Not MET  [] Not tested  2/27/2024   2. Patient to increase bilateral LE strength to 4+/5 or greater, in order to improve endurance and increase ability to perform all functional activities for increased time. [x] Progressing  [] MET   [] Not MET  [] Not tested  2/27/2024   3. Patient to normalize score on 5 times sit to stand and single leg balance, in order to improve endurance and decrease fall risk. [x] Progressing  [] MET   [] Not MET  [] Not tested  2/27/2024   4. Patient to perform daily activities including walking/standing for increased time (with assistive device if indicated) with only mild increased symptoms. [x] Progressing  [] MET   [] Not MET  [] Not tested  2/27/2024        PLAN     Monitor response to  today's treatment session and progress with Physical Therapy plan of care as indicated.    Plan of care Certification: 1/25/2024 to 3/35/24.     Outpatient Physical Therapy 2 times weekly for 8 weeks to include any combination of the following interventions: Aquatic Therapy, Gait Training, Manual Therapy, Neuromuscular Re-ed, Patient Education/Self Care, Therapeutic Activites, Therapeutic Exercise, and Moist Hot Pack/Cold Pack    Daniel Cerrato PTA,DPT  3/21/2024

## 2024-03-27 NOTE — PROGRESS NOTES
"[facility]   Physical Therapy Treatment Note & PROGRESS NOTE       Name: Annika Mac  Clinic Number: [id number]    Therapy Diagnosis:   Encounter Diagnoses   Name Primary?    Decreased functional mobility and endurance Yes    Gait abnormality     Balance problem     Decreased strength     Pain      Physician: Evangelist Hamilton FNP    Visit Date: 3/28/2024    Physician Orders: PT Eval and Treat  Medical Diagnosis from Referral: Lumbar radiculopathy; DDD (degenerative disc disease), lumbar; Dorsalgia of lumbar region  Evaluation Date: 1/25/2024  Authorization Period Expiration:12/31/24  Plan of Care Expiration: 3/25/2024  Progress Note Due: 3/25/2024  Visit # / Visits authorized: 13/20 (+1)  FOTO: 3/3 (last performed on 2/27/2024)    PTA Visit #: 0/5     Precautions: Standard, Diabetes, Fall, and PAD, hx of DVT, COPD, asthma, anterolisthesis      Time In: 10:30 AM  Time Out: 11:30 AM  Total Billable Time: 60 minutes    SUBJECTIVE     Pt reports: she is feeling good today. She is going to pain management next week to discuss further treatment options.     She was compliant with home exercise program.  Response to previous treatment: sore  Functional change:    Pain: 4/10 back, 4/10 in right knee  Location:  bilateral back  and down legs to middle of calf     OBJECTIVE     Objective Measures updated at progress report unless specified.     TREATMENT   Annika received the treatments listed below:    (exercises performed today are bolded)    Therapeutic exercises to develop strength, endurance, ROM, flexibility, posture, and core stabilization for 35 minutes including:  NuStep for increased LE ROM, strength and endurance x7 minutes  Seated lumbar flexion stretch, 3x10  DKTC w/ SB, 3x10  Ball Squeezes 3'  Hip Abduction BTB 3'  Long Arc Quads, 5#, 3'  Seated Marches, 5#, 3'  Seated hamstring curl BTB 3 x 10  SL Gastroc Stretch on slant board, 3x1' ea  Heel raises 3 x 15  Seated F4 stretch, 3x45"  SLR, " "3x10    Manual therapy techniques: Joint mobilizations, Myofacial release, and Soft tissue Mobilization were applied to the: right knee for 0 minutes, including:  +foam rolling to left IT band, glutes, piriformis, and lumbar paraspinals  +Side-lying (L) long leg distraction 2 x 30"     Neuromuscular re-education activities to improve: Balance, Coordination, Proprioception, and Posture for 25 minutes. The following activities were included:  Sit to stands 3 x 10   Side Steps in parallel bars 5x  Standing marches in // bars, 3x10  Standing hip abduction in // bars, 2x10  Deadbugs, feet down, 1x10  Step ups on 6 inch step, 2x10  Lateral step ups on 6 inch step, 2x10  Supine Marches w/ TA contraction, 3x10       PATIENT EDUCATION AND HOME EXERCISES     Home Exercises Provided and Patient Education Provided     Education provided:   - Importance of strength    Written Home Exercises Provided: Patient instructed to cont prior HEP. Exercises were reviewed and Annika was able to demonstrate them prior to the end of the session.  Annika demonstrated good  understanding of the education provided. See EMR under Patient Instructions for exercises provided during therapy sessions    ASSESSMENT   Patient tolerated therapy session good today. Interventions targeted functional strength and ROM for BLE and core. Continues to show good improvements in functional mobility and overall strength. She was discharged today. She plans to come back if the doctor states to continue with therapy.     Annika Is progressing well towards her goals.   Pt prognosis is Fair.     Pt will continue to benefit from skilled outpatient physical therapy to address the deficits listed in the problem list box on initial evaluation, provide pt/family education and to maximize pt's level of independence in the home and community environment.     Pt's spiritual, cultural and educational needs considered and pt agreeable to plan of care and goals.     Anticipated " barriers to physical therapy: co-morbidities, sedentary lifestyle, chronicity of condition, lack of understanding of condition, adherence to treatment plan, transportation, coping style, and extensive prior therapy     Goals:       Short Term Goals: In 4 weeks  Progress Date   1.Patient to be educated on HEP. [] Progressing  [x] MET   [] Not MET   [] Not tested  2/27/2024   2.Patient to increase trunk range of motion, in order to improve available range of motion for ADL's.  [] Progressing  [] MET   [] Not MET  [x] Not tested  2/27/2024   3.Patient to increase bilateral LE strength by 1/2 grade, in order to improve endurance and increase ability to perform all functional activities for increased time.  [] Progressing  [x] MET   [] Not MET  [] Not tested  2/27/2024   4.Patient to have pain less than 2/10 at worst, to improve QOL. [x] Progressing  [] MET   [] Not MET  [] Not tested  2/27/2024   5.Patient to improve score on the FOTO, to improve QOL. [x] Progressing  [] MET   [] Not MET  [] Not tested  2/27/2024   6. Patient to improve score on 5 times sit to stand and single leg balance in order to decrease fall risk. [x] Progressing  [] MET   [] Not MET  [] Not tested  2/27/2024      Long Term Goals: In 8 weeks Progress Date   1.Patient to improve score on the FOTO predicted score or better, to improve QOL. [x] Progressing  [] MET   [] Not MET  [] Not tested  2/27/2024   2. Patient to increase bilateral LE strength to 4+/5 or greater, in order to improve endurance and increase ability to perform all functional activities for increased time. [x] Progressing  [] MET   [] Not MET  [] Not tested  2/27/2024   3. Patient to normalize score on 5 times sit to stand and single leg balance, in order to improve endurance and decrease fall risk. [x] Progressing  [] MET   [] Not MET  [] Not tested  2/27/2024   4. Patient to perform daily activities including walking/standing for increased time (with assistive device if indicated)  with only mild increased symptoms. [x] Progressing  [] MET   [] Not MET  [] Not tested  2/27/2024        PLAN     Monitor response to today's treatment session and progress with Physical Therapy plan of care as indicated.    Plan of care Certification: Patient discharged from physical therapy on 3/28/2024    Ashley Pressley PT,DPT  3/28/2024

## 2024-03-28 ENCOUNTER — CLINICAL SUPPORT (OUTPATIENT)
Dept: REHABILITATION | Facility: HOSPITAL | Age: 74
End: 2024-03-28
Payer: MEDICARE

## 2024-03-28 DIAGNOSIS — R26.89 BALANCE PROBLEM: ICD-10-CM

## 2024-03-28 DIAGNOSIS — R52 PAIN: ICD-10-CM

## 2024-03-28 DIAGNOSIS — Z74.09 DECREASED FUNCTIONAL MOBILITY AND ENDURANCE: Primary | ICD-10-CM

## 2024-03-28 DIAGNOSIS — R26.9 GAIT ABNORMALITY: ICD-10-CM

## 2024-03-28 DIAGNOSIS — R53.1 DECREASED STRENGTH: ICD-10-CM

## 2024-03-28 PROCEDURE — 97530 THERAPEUTIC ACTIVITIES: CPT | Mod: PN

## 2024-03-28 PROCEDURE — 97110 THERAPEUTIC EXERCISES: CPT | Mod: PN

## 2024-03-28 NOTE — PLAN OF CARE
[facility]   Discharge Note    Name: Annika Mac  [facility] Number: [id number]    Therapy Diagnosis:   Encounter Diagnoses   Name Primary?    Decreased functional mobility and endurance Yes    Gait abnormality     Balance problem     Decreased strength     Pain      Physician: Evangelist Hamilton FNP    Physician Orders: PT Eval and Treat  Medical Diagnosis from Referral: Lumbar radiculopathy; DDD (degenerative disc disease), lumbar; Dorsalgia of lumbar region  Evaluation Date: 1/25/2024    Date of Last visit: 3/28/2024  Total Visits Received: 15    ASSESSMENT      Discharge reason: Patient has completed the physician's prescription and Patient requested discharge    Discharge FOTO Score: 55    Goals:   Short Term Goals: In 4 weeks  Progress Date   1.Patient to be educated on HEP. [] Progressing  [x] MET   [] Not MET   [] Not tested  2/27/2024   2.Patient to increase trunk range of motion, in order to improve available range of motion for ADL's.  [] Progressing  [] MET   [] Not MET  [x] Not tested  2/27/2024   3.Patient to increase bilateral LE strength by 1/2 grade, in order to improve endurance and increase ability to perform all functional activities for increased time.  [] Progressing  [x] MET   [] Not MET  [] Not tested  2/27/2024   4.Patient to have pain less than 2/10 at worst, to improve QOL. [x] Progressing  [] MET   [] Not MET  [] Not tested  2/27/2024   5.Patient to improve score on the FOTO, to improve QOL. [x] Progressing  [] MET   [] Not MET  [] Not tested  2/27/2024   6. Patient to improve score on 5 times sit to stand and single leg balance in order to decrease fall risk. [x] Progressing  [] MET   [] Not MET  [] Not tested  2/27/2024      Long Term Goals: In 8 weeks Progress Date   1.Patient to improve score on the FOTO predicted score or better, to improve QOL. [x] Progressing  [] MET   [] Not MET  [] Not tested  2/27/2024   2. Patient to increase bilateral LE strength to 4+/5 or  greater, in order to improve endurance and increase ability to perform all functional activities for increased time. [x] Progressing  [] MET   [] Not MET  [] Not tested  2/27/2024   3. Patient to normalize score on 5 times sit to stand and single leg balance, in order to improve endurance and decrease fall risk. [x] Progressing  [] MET   [] Not MET  [] Not tested  2/27/2024   4. Patient to perform daily activities including walking/standing for increased time (with assistive device if indicated) with only mild increased symptoms. [x] Progressing  [] MET   [] Not MET  [] Not tested  2/27/2024       PLAN   This patient is discharged from Physical Therapy    Ashley Pressley, PT,DPT  3/28/2024

## 2024-04-03 ENCOUNTER — TELEPHONE (OUTPATIENT)
Dept: PODIATRY | Facility: CLINIC | Age: 74
End: 2024-04-03
Payer: MEDICARE

## 2024-04-04 ENCOUNTER — OFFICE VISIT (OUTPATIENT)
Dept: PAIN MEDICINE | Facility: CLINIC | Age: 74
End: 2024-04-04
Payer: MEDICARE

## 2024-04-04 VITALS
DIASTOLIC BLOOD PRESSURE: 49 MMHG | HEART RATE: 71 BPM | BODY MASS INDEX: 48.82 KG/M2 | WEIGHT: 293 LBS | SYSTOLIC BLOOD PRESSURE: 91 MMHG | HEIGHT: 65 IN

## 2024-04-04 DIAGNOSIS — E66.01 MORBID OBESITY: ICD-10-CM

## 2024-04-04 DIAGNOSIS — M47.816 FACET ARTHRITIS OF LUMBAR REGION: Primary | ICD-10-CM

## 2024-04-04 DIAGNOSIS — G89.4 CHRONIC PAIN SYNDROME: ICD-10-CM

## 2024-04-04 DIAGNOSIS — M51.36 DDD (DEGENERATIVE DISC DISEASE), LUMBAR: ICD-10-CM

## 2024-04-04 DIAGNOSIS — M47.816 LUMBAR FACET ARTHROPATHY: ICD-10-CM

## 2024-04-04 DIAGNOSIS — M47.816 LUMBAR SPONDYLOSIS: ICD-10-CM

## 2024-04-04 DIAGNOSIS — E11.42 TYPE 2 DIABETES MELLITUS WITH DIABETIC POLYNEUROPATHY, UNSPECIFIED WHETHER LONG TERM INSULIN USE: ICD-10-CM

## 2024-04-04 PROCEDURE — 1125F AMNT PAIN NOTED PAIN PRSNT: CPT | Mod: CPTII,S$GLB,, | Performed by: NURSE PRACTITIONER

## 2024-04-04 PROCEDURE — 3074F SYST BP LT 130 MM HG: CPT | Mod: CPTII,S$GLB,, | Performed by: NURSE PRACTITIONER

## 2024-04-04 PROCEDURE — 3051F HG A1C>EQUAL 7.0%<8.0%: CPT | Mod: CPTII,S$GLB,, | Performed by: NURSE PRACTITIONER

## 2024-04-04 PROCEDURE — 1160F RVW MEDS BY RX/DR IN RCRD: CPT | Mod: CPTII,S$GLB,, | Performed by: NURSE PRACTITIONER

## 2024-04-04 PROCEDURE — 99999 PR PBB SHADOW E&M-EST. PATIENT-LVL V: CPT | Mod: PBBFAC,,, | Performed by: NURSE PRACTITIONER

## 2024-04-04 PROCEDURE — 3008F BODY MASS INDEX DOCD: CPT | Mod: CPTII,S$GLB,, | Performed by: NURSE PRACTITIONER

## 2024-04-04 PROCEDURE — 99214 OFFICE O/P EST MOD 30 MIN: CPT | Mod: S$GLB,,, | Performed by: NURSE PRACTITIONER

## 2024-04-04 PROCEDURE — 1159F MED LIST DOCD IN RCRD: CPT | Mod: CPTII,S$GLB,, | Performed by: NURSE PRACTITIONER

## 2024-04-04 PROCEDURE — 3078F DIAST BP <80 MM HG: CPT | Mod: CPTII,S$GLB,, | Performed by: NURSE PRACTITIONER

## 2024-04-04 PROCEDURE — 4010F ACE/ARB THERAPY RXD/TAKEN: CPT | Mod: CPTII,S$GLB,, | Performed by: NURSE PRACTITIONER

## 2024-04-04 NOTE — PROGRESS NOTES
[facility] Established  Patient Evaluation    Referred by: Dr Marino Ragland  Reason for referral: Right Knee    CC:   Chief Complaint   Patient presents with    Low-back Pain    Leg Pain    Knee Pain   8/26/2014   Pain Disability Index (PDI) 34 21       Interval History 4/4/2024:  73-year-old female that presents today for follow-up appointment she has a history of chronic low back pain without radiculopathy.  Her pain typically presents toward the end of the day in a bandlike distribution.  She has been provided a bilateral L3, L4 and L5 radiofrequency in January of this year that provided her 50% relief and mild-to-moderate improvement in her functionality.  She acknowledges completing physical therapy and being discharged with a home exercise plan.  She did report having an upcoming procedure at [facility] to check the perfusion in her lower extremities.  Continues to take Lyrica and Tylenol for pain denies any adverse side effects with these medications.  She denies any new pain her pain score today is 6/10.    Interval History 1/4/2024:  72 y/o female with a hx of chronic low back pain, she is s/p  Bilateral L3, L4, and L5 Lumbar Radiofrequency on 01/03/2024 reporting 50% and improved functionality.  She is requesting aqua therapy today she does report improved functionality following her most recent procedure.  .     Interval Updates: 11/14/2023: Annika Mac low back pain, right knee pain, pain at base of right thumb.  Low back pain is worse. She previously underwent Lumbar RFA with >50% relief for 1 year. Pain has now returned.  Discussed repeating RFA.   Her right knee needs to be replaced but needs to lose weight first.  Right thumb pain worse with typing. Pain noted at the base of the thumb. She is right handed.     Interval History 06/07/2022 -  Bubba returns to clinic s/p  Lumbar Medial Branch Radiofrequency Ablation on 5/19/22 with 50% relief of her low back pain.  She is reporting new pain  "today this pain starts in her left buttocks and radiates down the posterior aspect of her leg to her foot.  Pain is worse when sitting patient currently therapy for low back states that some of the exercises are helping, however this pain is been ongoing for the last 2 weeks and she would like to be considered for injections to help.  She reports a pain intensity 8/10 today with a weekly range of 8-9/10.  The pain is described as Aching, Sharp and Shooting.  Pain is worsened by: sitting and improved by: nothing.      Interval History 4/11/2022  -  Bubba returns to clinic s/p a Diagnostic Bilateral Lumbar MBB targeting L4/5 and L5/S1 on 4/2/22 with 90% relief with improved functionality.   She reports a pain intensity 6/10 today with a weekly range of 6-7/10.     Interval Updates:  3/28/2022 - - Ms. Mac is following up for Follow-up  Pain is currently rate 8/10 with a weekly range 8-9/10.  Currently her low back pain and his worst we will begin addressing this 1st.  She has seen orthopedics/Dr. Linares he wants her to lose 10 lb prior to consenting her for right TKA.  Sparse her low back pain she actually received benefit from a previous bilateral lumbar facet injection 5% we targeted the L4-5 L5-S1 facet joints.  She complains of low back pain that has continued, pain is located across her low back with mild radiation into her legs bilaterally.  She denies any recent incident, denies any profound weakness, denies any bowel bladder dysfunction, denies any saddle anesthesia at this time.     5/21/21 - Ms. Mac returns to clinic for follow up visit reporting stable low back pain.  Patient is s/p Bilateral L4-5 and L5-S1 Lumbar Facet Injection on 5/6/21 with 75% relief for a" few days and then pain returned"  Pain intensity is currently 7/10.      HPI:   Annika Mac is a 70 y.o. female with numerous problems resulting from, or exacerbated by, morbid obesity who presents complaining of chronic low back pain.  She " states longstanding low back pain with a minor component of pain radiating into the back of the legs, all way down to the calf, bilaterally.  She reports rapidly increasing back pain with standing or walking for more than 5 min, requiring her to sit and rest.  Pain decreases very quickly when resting.  In 2014, she received a bilateral facet steroid injection in the low back.  At that time, the provider documented 90% relief of pain.    Location: low back, left buttocks, left posterior leg to ankle  Onset: 2-3 months ago  Current Pain Score: 7/10  Daily Pain of Range: 5-8/10  Quality: Aching, Sharp and Shooting  Radiation: Buttocks  Worsened by: daily activity  Improved by: nothing    Previous Therapies:  PT/OT: Yes, and currently restarting PT  HEP: no  Interventions:   - 01/03/2024 Bilateral L3, L4, and L5 Lumbar Radiofrequency Ablation 50%  4/5/2022 Bilateral facet injection L4-5 and L5-S1 in 2014 with 90% relief  Surgery: Denies back surgery  Medications:   - NSAIDS:   - MSK Relaxants:   - TCAs:   - SNRIs:   - Topicals:   - Anticonvulsants:  - Opioids:     Current Pain Medications:  Lyrica 150 mg b.i.d.  Cymbalta 30 mg daily    Review of Systems:  Review of Systems   Constitutional:  Negative for chills and fever.   HENT:  Negative for nosebleeds.    Eyes:  Negative for blurred vision.   Respiratory:  Negative for hemoptysis.    Cardiovascular:  Negative for chest pain and palpitations.   Gastrointestinal:  Negative for heartburn and vomiting.   Genitourinary:  Negative for hematuria.   Musculoskeletal:  Positive for back pain and joint pain. Negative for falls and myalgias.   Skin:  Negative for rash.   Neurological:  Negative for tingling, focal weakness, seizures and loss of consciousness.   Endo/Heme/Allergies:  Does not bruise/bleed easily.   Psychiatric/Behavioral:  Negative for hallucinations.        History:    Current Outpatient Medications:     albuterol (PROAIR HFA) 90 mcg/actuation inhaler, Inhale 2  "puffs into the lungs every 6 (six) hours as needed for Wheezing. Rescue, Disp: 18 g, Rfl: 6    allopurinoL (ZYLOPRIM) 300 MG tablet, Take 1 tablet (300 mg total) by mouth once daily., Disp: 90 tablet, Rfl: 4    amLODIPine (NORVASC) 10 MG tablet, Take 1 tablet (10 mg total) by mouth once daily., Disp: 90 tablet, Rfl: 3    ammonium lactate 12 % Crea, Apply twice daily to affected parts both feet as needed., Disp: 140 g, Rfl: 11    aspirin 81 MG Chew, Take 1 tablet (81 mg total) by mouth once daily., Disp: , Rfl: 0    BD ULTRA-FINE KIKA PEN NEEDLE 32 gauge x 5/32" Ndle, To use 4 times daily, Disp: 200 each, Rfl: 20    betamethasone dipropionate (DIPROLENE) 0.05 % cream, Apply topically 2 (two) times daily. Prn hand rash, Disp: 45 g, Rfl: 0    blood sugar diagnostic Strp, 1 each by Misc.(Non-Drug; Combo Route) route 3 (three) times daily. Dx code  E11.42 . Contour Next, Disp: 200 each, Rfl: 12    blood-glucose meter kit, 1 each by Other route once daily. Use daily. Insurance proffered one touch  E11.42 (Patient taking differently: 1 each by Other route once daily. Contour next .  Insurance proffered one touch  E11.42), Disp: 1 each, Rfl: 0    blood-glucose sensor (DEXCOM G6 SENSOR) Terese, Use as Directed, Disp: 2 Device, Rfl: 11    blood-glucose transmitter (DEXCOM G6 TRANSMITTER) Terese, Every 3 months. Use as Directed to check blood glucose., Disp: 1 Device, Rfl: 3    chlorthalidone (HYGROTEN) 25 MG Tab, Take 1 tablet (25 mg total) by mouth once daily., Disp: 30 tablet, Rfl: 11    colchicine (COLCRYS) 0.6 mg tablet, TAKE 1 TABLET EVERY OTHER DAY, Disp: 45 tablet, Rfl: 3    diclofenac sodium (VOLTAREN) 1 % Gel, Apply 2 g topically 4 (four) times daily., Disp: 1 Tube, Rfl: 2    DULoxetine (CYMBALTA) 30 MG capsule, Take 1 capsule (30 mg total) by mouth once daily., Disp: 90 capsule, Rfl: 3    famotidine (PEPCID) 20 MG tablet, Take 1 tablet (20 mg total) by mouth 2 (two) times daily., Disp: 1 tablet, Rfl: 0    fexofenadine " (ALLEGRA) 180 MG tablet, TAKE 1 TABLET BY MOUTH ONCE DAILY, Disp: 30 tablet, Rfl: 0    glucagon (GVOKE HYPOPEN 1-PACK) 0.5 mg/0.1 mL AtIn, Inject 1 Dose into the skin daily as needed (for severe hypoglycemia if you aren't able to treat by ingesting sugar). (Patient not taking: Reported on 5/17/2021), Disp: 1 Syringe, Rfl: 3    HYDROcodone-acetaminophen (NORCO) 7.5-325 mg per tablet, Take 1 tablet by mouth every 6 (six) hours as needed. (Patient not taking: Reported on 4/7/2021), Disp: 28 tablet, Rfl: 0    insulin NPH-insulin regular, 70/30, (NOVOLIN 70/30 U-100 INSULIN) 100 unit/mL (70-30) injection, 120 UNITS INTO THE SKIN WITH BREAKFAST, 110 UNITS WITH LUNCH, AND INJECT 90 UNITS WITH DINNER ; . (Patient taking differently: 110 UNITS INTO THE SKIN WITH BREAKFAST, 85 UNITS WITH LUNCH, AND INJECT 65 UNITS WITH DINNER ; .), Disp: 30 mL, Rfl: 3    irbesartan (AVAPRO) 300 MG tablet, Take 1 tablet (300 mg total) by mouth every evening., Disp: 90 tablet, Rfl: 3    ketoconazole (NIZORAL) 2 % cream, Apply topically once daily., Disp: 1 Tube, Rfl: 2    lancets 31 gauge Misc, 1 lancet by Misc.(Non-Drug; Combo Route) route 4 (four) times daily. E11.42 . Prescribed one touch, Disp: 200 each, Rfl: 6    LIDOcaine HCL 2% (XYLOCAINE) 2 % jelly, Apply topically as needed. Apply topically once nightly to affected part of foot/feet. (Patient not taking: Reported on 5/17/2021), Disp: 30 mL, Rfl: 2    methylPREDNISolone (MEDROL DOSEPACK) 4 mg tablet, use as directed (Patient not taking: Reported on 5/17/2021), Disp: 1 Package, Rfl: 0    multivitamin (THERAGRAN) per tablet, Take 1 tablet by mouth once daily., Disp: , Rfl:     pregabalin (LYRICA) 150 MG capsule, Take 1 capsule (150 mg total) by mouth 2 (two) times daily., Disp: 180 capsule, Rfl: 3    semaglutide (OZEMPIC) 1 mg/dose (2 mg/1.5 mL) PnIj, Inject 0.75 mLs into the skin every 7 days. (1 mg every week), Disp: 9 mL, Rfl: 4    simvastatin (ZOCOR) 40 MG tablet, Take 1  tablet (40 mg total) by mouth every evening., Disp: 90 tablet, Rfl: 3    sodium bicarbonate 325 MG tablet, Take 2 tablets (650 mg total) by mouth 2 (two) times daily., Disp: 120 tablet, Rfl: 11    topiramate (TOPAMAX) 50 MG tablet, Take 1 tablet (50 mg total) by mouth 2 (two) times daily., Disp: 180 tablet, Rfl: 0    traMADol (ULTRAM) 50 mg tablet, Take 1 tablet (50 mg total) by mouth every 8 (eight) hours as needed., Disp: 90 tablet, Rfl: 3  No current facility-administered medications for this visit.    Facility-Administered Medications Ordered in Other Visits:     fentaNYL injection 25 mcg, 25 mcg, Intravenous, Q5 Min PRN, Desmond Fontana MD, 50 mcg at 21 09    midazolam (VERSED) 1 mg/mL injection 0.5 mg, 0.5 mg, Intravenous, PRN, Desmond Fontana MD, 2 mg at 21 0955    Past Medical History:   Diagnosis Date    Asthma     Constipation 10/3/2019    Deep vein thrombophlebitis of left leg 2012    Deep vein thrombosis     when she had a knee replacement    Encounter for blood transfusion     anemic     GERD (gastroesophageal reflux disease)     Obesity, diabetes, and hypertension syndrome 2019    Obstructive sleep apnea 2012    PAD (peripheral artery disease) 2013    Type 2 diabetes mellitus with obesity 2020    Type 2 diabetes mellitus with peripheral artery disease 2020       Past Surgical History:   Procedure Laterality Date    CATARACT EXTRACTION W/  INTRAOCULAR LENS IMPLANT Left 3/2002    left     CATARACT EXTRACTION W/  INTRAOCULAR LENS IMPLANT Right     OD dr. olivares     SECTION      COLONOSCOPY N/A 2018    Procedure: COLONOSCOPY;  Surgeon: Faizan Mac MD;  Location: [facility] ([address] [place] [address]);  Service: Endoscopy;  Laterality: N/A;    CYST REMOVAL  2011    left side of face    CYSTOSCOPY W/ RETROGRADES Left 2020    Procedure: CYSTOSCOPY, WITH RETROGRADE PYELOGRAM;  Surgeon: Noman Rivas MD;  Location: [facility]  OR 1ST FLR;  Service: Urology;  Laterality: Left;  30min    DE QUERVAIN'S RELEASE  1/8/2021    Procedure: RELEASE, HAND, FOR DEQUERVAIN'S TENOSYNOVITIS;  Surgeon: Marky Hagen MD;  Location: [facility] OR;  Service: Orthopedics;;    EYE SURGERY Bilateral 2012    eye implants    GANGLION CYST EXCISION  11/13/2007    Right wrist    INJECTION OF FACET JOINT Bilateral 5/6/2021    Procedure: INJECTION, FACET JOINT--Bilateral L4-5, L5-S1;  Surgeon: Alireza Meraz Jr., MD;  Location: [facility] PAIN MGT;  Service: Pain Management;  Laterality: Bilateral;  Oral    INTERPOSITION ARTHROPLASTY OF CARPOMETACARPAL JOINTS Left 1/8/2021    Procedure: INTERPOSITION ARTHROPLASTY, CMC JOINT - left dequervains and cmc arthroplasty, arthrex;  Surgeon: Marky Hagen MD;  Location: [facility] OR;  Service: Orthopedics;  Laterality: Left;    JOINT REPLACEMENT Left     Pan Retinal Photocoagulation Bilateral     Dr. Monterroso (Proliferative Diabetic Retinopathy)    TOTAL ABDOMINAL HYSTERECTOMY  1982    TOTAL KNEE ARTHROPLASTY  2/2006    left    TRIGGER FINGER RELEASE  9/18/2009       Family History   Problem Relation Age of Onset    Diabetes Mother     Hypertension Mother     Heart disease Father     Diabetes Sister     Thyroid disease Brother     Diabetes Brother     Hypertension Brother     No Known Problems Daughter     No Known Problems Son     No Known Problems Daughter     Amblyopia Neg Hx     Blindness Neg Hx     Glaucoma Neg Hx     Macular degeneration Neg Hx     Retinal detachment Neg Hx     Strabismus Neg Hx     Colon cancer Neg Hx     Esophageal cancer Neg Hx        Social History     Socioeconomic History    Marital status:      Spouse name: Heber    Number of children: 3    Years of education: Not on file    Highest education level: Not on file   Occupational History    Occupation:    Tobacco Use    Smoking status: Never Smoker    Smokeless tobacco: Never Used   Substance and Sexual Activity    Alcohol use: No    Drug  use: No    Sexual activity: Yes     Partners: Male   Other Topics Concern    Not on file   Social History Narrative    , lives with family; 3 children, 4 grandchildren     Social Determinants of Health     Financial Resource Strain:     Difficulty of Paying Living Expenses:    Food Insecurity:     Worried About Running Out of Food in the Last Year:     Ran Out of Food in the Last Year:    Transportation Needs:     Lack of Transportation (Medical):     Lack of Transportation (Non-Medical):    Physical Activity:     Days of Exercise per Week:     Minutes of Exercise per Session:    Stress: No Stress Concern Present    Feeling of Stress : Not at all   Social Connections:     Frequency of Communication with Friends and Family:     Frequency of Social Gatherings with Friends and Family:     Attends Church Services:     Active Member of Clubs or Organizations:     Attends Club or Organization Meetings:     Marital Status:        Review of patient's allergies indicates:   Allergen Reactions    Iodinated contrast media Rash       Physical Exam:  There were no vitals filed for this visit.  GEN: No acute distress. Calm, comfortable  HENT: Normocephalic, atraumatic, moist mucous membranes  EYE: Anicteric sclera, non-injected.   CV: Non-diaphoretic.   RESP: Breathing comfortably. Chest expansion symmetric.  PSYCH: Pleasant mood and appropriate affect. Recent and remote memory intact.       Imagin2021  X-ray Knee Ortho Right  CLINICAL HISTORY:  R KNEE; Pain in right knee  TECHNIQUE:  AP standing of both knees, Merchant views of both knees as well as a lateral view of the right knee were performed.  COMPARISON:  2018  FINDINGS:  No acute fracture seen.  No significant suprapatellar joint effusion.  Tricompartmental osteophyte formation with advanced narrowing of the medial and patellofemoral compartments.  Previous left knee arthroplasty with metallic components in good  position.  Impression:  Osteoarthritis with advanced joint space narrowing.    Labs:  BMP  Lab Results   Component Value Date     03/22/2021    K 4.0 03/22/2021     03/22/2021    CO2 26 03/22/2021    BUN 30 (H) 03/22/2021    CREATININE 1.8 (H) 03/22/2021    CALCIUM 9.0 03/22/2021    ANIONGAP 8 03/22/2021    ESTGFRAFRICA 32.2 (A) 03/22/2021    EGFRNONAA 27.9 (A) 03/22/2021     Lab Results   Component Value Date    ALT 18 03/22/2021    AST 24 03/22/2021    ALKPHOS 122 03/22/2021    BILITOT 0.3 03/22/2021     Wt Readings from Last 5 Encounters:   05/17/21 (!) 142.9 kg (315 lb 0.6 oz)   05/06/21 95.3 kg (210 lb)   04/15/21 (!) 145.6 kg (320 lb 15.8 oz)   04/07/21 (!) 145.6 kg (321 lb)   03/30/21 (!) 146.5 kg (323 lb)       Assessment:  Problem List Items Addressed This Visit          Neuro    DDD (degenerative disc disease), lumbar    Chronic pain       Orthopedic    Chronic back pain          Other Visit Diagnoses       Lumbar spondylosis    -  Primary    Arthropathy of facet joints at multiple levels        Primary osteoarthritis of both knees                4/28/21 - Annikarach Mac is a 70 y.o. female with diffuse chronic pain related to degenerative joint disease and degenerative spine disease complicated by morbid obesity and likely sedentary lifestyle as well.  Patient is currently receiving treatment for weight reduction by the Bariatric Medicine Department and follows up with Rheumatology, along with a multitude of other providers.  She received a bilateral lumbar facet injection for low back pain in 2014 which provided her 90% relief based on postoperative assessment.  I recommended repeating this injection.  While patient was referred for knee pain, she chose to focus on low back pain during today's encounter; however, we can treat her knee pain in the future if that is something she wants to discuss.  When she was last in this clinic in 2014 she received recommendations to lose weight and establish  a daily home exercise regimen, both of which remain relevant today.    5/21/2021- Annika Mac is a 70 y.o. female who  has a past medical history of Asthma, Constipation (10/3/2019), Deep vein thrombophlebitis of left leg (7/27/2012), Deep vein thrombosis, Encounter for blood transfusion, GERD (gastroesophageal reflux disease), Obesity, diabetes, and hypertension syndrome (2/13/2019), Obstructive sleep apnea (7/27/2012), PAD (peripheral artery disease) (11/21/2013), Type 2 diabetes mellitus with obesity (8/19/2020), and Type 2 diabetes mellitus with peripheral artery disease (8/19/2020).  By history and examination this patient has chronic low back pain without  radiculopathy.  The underlying cause cause is facet arthritis, degenerative disc disease, muscle dysfunction and deconditioning.  Pathology is confirmed by imaging.  We discussed the underlying diagnoses and multiple treatment options including non-opioid medications, injections, physical therapy, home exercise, activity modification / rest and weight loss.  The risks and benefits of each treatment option were discussed and all questions were answered.  I discussed with patient I will consult Dr. Meraz to see if he would like to move forward with scheduling the bilateral lumbar RFA, patient verbalized understanding and agreed.  Until that I recommended that she continue with physical therapy as well as stabbing home exercise plan that involves daily stretching and core strengthening.    03/28/[id number]-xecb-old morbidly obese female with a history of chronic low back pain related to facet arthropathy, DDD complicated by morbid obesity and a sedentary lifestyle.  She has benefited from facet steroid injections targeting L4-5 and L5-S1, patient today on recommending a diagnostic bilateral lumbar MBB targeting L4-5 L5-S1 and considering her for a lumbar RFA following.  The patient was amenable to this plan also provide her with information explain the  procedure even further.   Lastly, we did discuss considering her for a right knee GN block and RFA to help with chronic right knee pian, she is being considered for a right TKA with Dr. Ragland once she has los approximately 10 lbs.     04/11/20221183-38-ijey-old female with a history of chronic low back pain related to facet arthropathy, DDD complicated by morbid obesity and sedentary lifestyle.  She is status post her 1st diagnostic lumbar MBB targeting L4-5 L5-S1 with 9% relief and improved functionality.  Discussed today that will now recommend a 2nd diagnostic lumbar MBB targeting the above-mentioned levels.  If appropriate we will schedule for lumbar RFA.    06/07/20221340-55-yrwx-old female status post a lumbar RFA targeting L4-5 L5-S1 she did receive 50% relief of her low back pain.  She did have a new complaint today pain in the left buttocks radiating down the posterior left leg, based on  history and exam her pain is likely related to piriformis syndrome, her pain is complicated by her morbid obesity and sedentary lifestyle.  I recommended establishing a HE plan that focuses on piriformis stretching exercises, I will also provided her some HE sheets.  Based on Previous lumbar MRI she does have disc bulges at L4-5 and L5-S1 couple with her facet arthropathy that results in mild spinal canal stenosis at L4-5.  At L5-S1 she has moderate left neural foraminal narrowing which could be the cause of her left leg pain however this is unclear at this point.  Discussed with her that if her pain does not get better over time with stretching that we can update her lumbar MRI and possibly consider a lumbar NAYLA based on the results.    11/14/2023 -  Annika Mac presents for follow up of her low back pain.  She reports >50% relief x 1 year following lumbar RFA targeting L4-5 L5-S1. She would like to repeat the procedure.  She also complains of right thumb pain.  I will order an x-ray and refer her to Ortho hand for possible  interventions.  We discussed possible genicular nerve blocks/RFA in the future.     1/4/[id number]-ogmu-old female with a history of chronic low back pain she is reporting greater than 50% relief following her recent lumbar RFA targeting L3, 4 and L5.  Patient exam patient has chronic low back pain without radiculopathy.  We discussed underlying causes facet arthritis and degenerative disc disease she also has muscle dysfunction deconditioning.  Recommended aqua therapy today to help with axial low back pain and to lose weight.      04/04/2024-Annika Mac is a 73 y.o. female who  has a past medical history of Asthma, Chronic obstructive pulmonary disease, unspecified COPD type (1/14/2022), Constipation (10/3/2019), Deep vein thrombophlebitis of left leg (7/27/2012), Deep vein thrombosis, Diabetic foot ulcer with osteomyelitis, Encounter for blood transfusion, GERD (gastroesophageal reflux disease), Obesity, diabetes, and hypertension syndrome (2/13/2019), Obstructive sleep apnea (7/27/2012), PAD (peripheral artery disease) (11/21/2013), Pressure ulcer of toe of left foot, Type 2 diabetes mellitus with obesity (8/19/2020), and Type 2 diabetes mellitus with peripheral artery disease (8/19/2020).  By history and examination this patient has chronic low back pain without radiculopathy.  The underlying cause cause is facet arthritis, degenerative disc disease, muscle dysfunction, muscles strain, and deconditioning.  Pathology is confirmed by imaging.  We discussed the underlying diagnoses and multiple treatment options including non-opioid medications, interventional procedures, physical therapy, home exercise, core muscle enhancement, activity modification, and weight loss.  The risks and benefits of each treatment option were discussed and all questions were answered.        : Reviewed and consistent with medication use as prescribed.    Treatment Plan:   Procedures:  none at this time.                     Consider  lumbar NAYLA however will need a updated image prior to.               Consider a right knee GNB and RFA in the future.   PT/OT/HEP:    - completed physical therapy   - daily cardio   - daily stretching   - focused HEP  Medications:  continue tylenol 1000 mg TID PRN not to exceed 3000 mg in 24 hrs educated patient  Labs: reviewed and medications are appropriately dosed for current hepatorenal function.  Imaging:  Previous imaging reviewed and discussed with the patient in detail today  Follow Up:  3-4 months or sooner if needed    YOCASTA Ray  Interventional Pain Management      Disclaimer: This note was partly generated using dictation software which may occasionally result in transcription errors.

## 2024-04-24 ENCOUNTER — LAB VISIT (OUTPATIENT)
Dept: LAB | Facility: HOSPITAL | Age: 74
End: 2024-04-24
Attending: INTERNAL MEDICINE
Payer: MEDICARE

## 2024-04-24 ENCOUNTER — OFFICE VISIT (OUTPATIENT)
Dept: INTERNAL MEDICINE | Facility: CLINIC | Age: 74
End: 2024-04-24
Payer: MEDICARE

## 2024-04-24 ENCOUNTER — OFFICE VISIT (OUTPATIENT)
Dept: PODIATRY | Facility: CLINIC | Age: 74
End: 2024-04-24
Payer: MEDICARE

## 2024-04-24 VITALS
SYSTOLIC BLOOD PRESSURE: 121 MMHG | HEIGHT: 65 IN | WEIGHT: 293 LBS | BODY MASS INDEX: 48.82 KG/M2 | DIASTOLIC BLOOD PRESSURE: 58 MMHG | HEART RATE: 73 BPM

## 2024-04-24 VITALS
WEIGHT: 293 LBS | HEART RATE: 71 BPM | OXYGEN SATURATION: 99 % | SYSTOLIC BLOOD PRESSURE: 126 MMHG | BODY MASS INDEX: 48.82 KG/M2 | DIASTOLIC BLOOD PRESSURE: 56 MMHG | HEIGHT: 65 IN

## 2024-04-24 DIAGNOSIS — I12.9 TYPE 2 DIABETES MELLITUS WITH STAGE 3B CHRONIC KIDNEY DISEASE AND HYPERTENSION: ICD-10-CM

## 2024-04-24 DIAGNOSIS — B35.1 ONYCHOMYCOSIS DUE TO DERMATOPHYTE: ICD-10-CM

## 2024-04-24 DIAGNOSIS — N18.32 TYPE 2 DIABETES MELLITUS WITH STAGE 3B CHRONIC KIDNEY DISEASE AND HYPERTENSION: ICD-10-CM

## 2024-04-24 DIAGNOSIS — I10 ESSENTIAL HYPERTENSION: ICD-10-CM

## 2024-04-24 DIAGNOSIS — L81.9 DISCOLORATION OF SKIN: ICD-10-CM

## 2024-04-24 DIAGNOSIS — E66.01 MORBID OBESITY: ICD-10-CM

## 2024-04-24 DIAGNOSIS — Z86.73 HISTORY OF STROKE: ICD-10-CM

## 2024-04-24 DIAGNOSIS — I13.0 HYPERTENSIVE HEART AND CHRONIC KIDNEY DISEASE WITH HEART FAILURE AND STAGE 1 THROUGH STAGE 4 CHRONIC KIDNEY DISEASE, OR UNSPECIFIED CHRONIC KIDNEY DISEASE: ICD-10-CM

## 2024-04-24 DIAGNOSIS — E11.22 TYPE 2 DIABETES MELLITUS WITH STAGE 3B CHRONIC KIDNEY DISEASE AND HYPERTENSION: Primary | ICD-10-CM

## 2024-04-24 DIAGNOSIS — E11.42 DIABETIC POLYNEUROPATHY ASSOCIATED WITH TYPE 2 DIABETES MELLITUS: Primary | ICD-10-CM

## 2024-04-24 DIAGNOSIS — M17.11 PRIMARY OSTEOARTHRITIS OF RIGHT KNEE: ICD-10-CM

## 2024-04-24 DIAGNOSIS — M46.96 UNSPECIFIED INFLAMMATORY SPONDYLOPATHY, LUMBAR REGION: ICD-10-CM

## 2024-04-24 DIAGNOSIS — E11.22 TYPE 2 DIABETES MELLITUS WITH STAGE 3B CHRONIC KIDNEY DISEASE AND HYPERTENSION: ICD-10-CM

## 2024-04-24 DIAGNOSIS — L84 CORN OR CALLUS: ICD-10-CM

## 2024-04-24 DIAGNOSIS — N18.32 TYPE 2 DIABETES MELLITUS WITH STAGE 3B CHRONIC KIDNEY DISEASE AND HYPERTENSION: Primary | ICD-10-CM

## 2024-04-24 DIAGNOSIS — I12.9 TYPE 2 DIABETES MELLITUS WITH STAGE 3B CHRONIC KIDNEY DISEASE AND HYPERTENSION: Primary | ICD-10-CM

## 2024-04-24 DIAGNOSIS — I77.819 ECTATIC AORTA: ICD-10-CM

## 2024-04-24 DIAGNOSIS — N18.4 STAGE 4 CHRONIC KIDNEY DISEASE: ICD-10-CM

## 2024-04-24 DIAGNOSIS — I51.89 DIASTOLIC DYSFUNCTION: Chronic | ICD-10-CM

## 2024-04-24 LAB
ALBUMIN/CREAT UR: 43.5 UG/MG (ref 0–30)
CREAT UR-MCNC: 92 MG/DL (ref 15–325)
MICROALBUMIN UR DL<=1MG/L-MCNC: 40 UG/ML

## 2024-04-24 PROCEDURE — 1126F AMNT PAIN NOTED NONE PRSNT: CPT | Mod: CPTII,S$GLB,, | Performed by: INTERNAL MEDICINE

## 2024-04-24 PROCEDURE — 1101F PT FALLS ASSESS-DOCD LE1/YR: CPT | Mod: CPTII,S$GLB,, | Performed by: INTERNAL MEDICINE

## 2024-04-24 PROCEDURE — 3051F HG A1C>EQUAL 7.0%<8.0%: CPT | Mod: CPTII,S$GLB,, | Performed by: INTERNAL MEDICINE

## 2024-04-24 PROCEDURE — 3074F SYST BP LT 130 MM HG: CPT | Mod: CPTII,S$GLB,, | Performed by: INTERNAL MEDICINE

## 2024-04-24 PROCEDURE — 4010F ACE/ARB THERAPY RXD/TAKEN: CPT | Mod: CPTII,S$GLB,, | Performed by: INTERNAL MEDICINE

## 2024-04-24 PROCEDURE — 1160F RVW MEDS BY RX/DR IN RCRD: CPT | Mod: CPTII,S$GLB,, | Performed by: INTERNAL MEDICINE

## 2024-04-24 PROCEDURE — 11056 PARNG/CUTG B9 HYPRKR LES 2-4: CPT | Mod: Q9,S$GLB,, | Performed by: PODIATRIST

## 2024-04-24 PROCEDURE — 3078F DIAST BP <80 MM HG: CPT | Mod: CPTII,S$GLB,, | Performed by: INTERNAL MEDICINE

## 2024-04-24 PROCEDURE — 1159F MED LIST DOCD IN RCRD: CPT | Mod: CPTII,S$GLB,, | Performed by: INTERNAL MEDICINE

## 2024-04-24 PROCEDURE — 99499 UNLISTED E&M SERVICE: CPT | Mod: S$GLB,,, | Performed by: PODIATRIST

## 2024-04-24 PROCEDURE — 3288F FALL RISK ASSESSMENT DOCD: CPT | Mod: CPTII,S$GLB,, | Performed by: INTERNAL MEDICINE

## 2024-04-24 PROCEDURE — 99999 PR PBB SHADOW E&M-EST. PATIENT-LVL IV: CPT | Mod: PBBFAC,,, | Performed by: PODIATRIST

## 2024-04-24 PROCEDURE — 3066F NEPHROPATHY DOC TX: CPT | Mod: CPTII,S$GLB,, | Performed by: INTERNAL MEDICINE

## 2024-04-24 PROCEDURE — 99214 OFFICE O/P EST MOD 30 MIN: CPT | Mod: S$GLB,,, | Performed by: INTERNAL MEDICINE

## 2024-04-24 PROCEDURE — 3060F POS MICROALBUMINURIA REV: CPT | Mod: CPTII,S$GLB,, | Performed by: INTERNAL MEDICINE

## 2024-04-24 PROCEDURE — 82043 UR ALBUMIN QUANTITATIVE: CPT | Performed by: INTERNAL MEDICINE

## 2024-04-24 PROCEDURE — 11721 DEBRIDE NAIL 6 OR MORE: CPT | Mod: Q9,59,S$GLB, | Performed by: PODIATRIST

## 2024-04-24 PROCEDURE — 99999 PR PBB SHADOW E&M-EST. PATIENT-LVL V: CPT | Mod: PBBFAC,,, | Performed by: INTERNAL MEDICINE

## 2024-04-24 RX ORDER — IRBESARTAN 300 MG/1
300 TABLET ORAL NIGHTLY
Qty: 90 TABLET | Refills: 3 | Status: SHIPPED | OUTPATIENT
Start: 2024-04-24

## 2024-04-24 RX ORDER — LABETALOL 300 MG/1
300 TABLET, FILM COATED ORAL 2 TIMES DAILY
Qty: 180 TABLET | Refills: 3 | Status: SHIPPED | OUTPATIENT
Start: 2024-04-24

## 2024-04-24 RX ORDER — CHLORTHALIDONE 25 MG/1
25 TABLET ORAL DAILY
Qty: 90 TABLET | Refills: 3 | Status: SHIPPED | OUTPATIENT
Start: 2024-04-24

## 2024-04-24 RX ORDER — KETOCONAZOLE 20 MG/G
CREAM TOPICAL DAILY
Qty: 60 G | Refills: 2 | Status: SHIPPED | OUTPATIENT
Start: 2024-04-24

## 2024-04-24 NOTE — PROGRESS NOTES
"Subjective:       Patient ID: Annika Mac is a 73 y.o. female.    Chief Complaint: Follow-up  This is a 73-year-old who presents today for follow-up patient reports that she has been having some issues with her toenails and has a podiatry appointment planned.  She noticed that she has some itching and loss of pigment in the right wrist she was concerned about.  Continues to follow with endocrinology in her hemoglobin A1c has improved she is tolerating her medicines without difficulty she reports she recently did a steady at [facility] for peripheral vascular disease she reports they will be sending her report or us a report once study is complete.    Follow-up  Associated symptoms include arthralgias.   Review of Systems   Musculoskeletal:  Positive for arthralgias and back pain.        Toenail issues seeing podaitry today        Objective:    Blood pressure (!) 126/56, pulse 71, height 5' 5" (1.651 m), weight (!) 136.4 kg (300 lb 11.3 oz), SpO2 99%.   Physical Exam  Constitutional:       General: She is not in acute distress.     Comments: Walks with walker    HENT:      Head: Normocephalic.      Mouth/Throat:      Pharynx: Oropharynx is clear.   Eyes:      General: No scleral icterus.  Cardiovascular:      Rate and Rhythm: Normal rate and regular rhythm.      Heart sounds: Murmur heard.      No friction rub. No gallop.   Pulmonary:      Effort: Pulmonary effort is normal. No respiratory distress.      Breath sounds: Normal breath sounds.   Abdominal:      General: Bowel sounds are normal.      Palpations: Abdomen is soft. There is no mass.      Tenderness: There is no abdominal tenderness.   Musculoskeletal:      Cervical back: Neck supple.      Comments: Toenail defomrity    Skin:     Findings: No erythema.      Comments: Area of dry skin/  Vit[place]o right wrist    Neurological:      Mental Status: She is alert.   Psychiatric:         Mood and Affect: Mood normal.         Assessment:       1. 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 Essential hypertension    3. Stage 4 chronic kidney disease    4. Discoloration of skin    5. History of stroke    6. Diastolic dysfunction    7. Primary osteoarthritis of right knee    8. Morbid obesity    9. Hypertensive heart and chronic kidney disease with heart failure and stage 1 through stage 4 chronic kidney disease, or unspecified chronic kidney disease    10. Unspecified inflammatory spondylopathy, lumbar region    11. Ectatic aorta        Plan:       Annika was seen today for follow-up.    Diagnoses and all orders for this visit:    Annika was seen today for follow-up.    Diagnoses and all orders for this visit:    Type 2 diabetes mellitus with stage 3b chronic kidney disease and hypertension  Improved control she continues to follow with endocrinology remains on insulin Trulicity Jardiance  -     Microalbumin/Creatinine Ratio, Urine; Future    Essential hypertension  Ectatic aorta  Continue current regimen blood pressure acceptable  -     irbesartan (AVAPRO) 300 MG tablet; Take 1 tablet (300 mg total) by mouth every evening.    Stage 4 chronic kidney disease  -     Ambulatory referral/consult to Nephrology; Future    Discoloration of skin  -     Ambulatory referral/consult to Dermatology; Future    History of stroke  Diastolic dysfunction    Primary osteoarthritis of right knee    Morbid obesity  She is working on weight loss remains on Jardiance and Trulicity    Hemiplegia and hemiparesis following cerebral infarction affecting right dominant side    Hypertensive heart and chronic kidney disease with heart failure and stage 1 through stage 4 chronic kidney disease, or unspecified chronic kidney disease    Unspecified inflammatory spondylopathy, lumbar region  Osteoarthritis of the right knee  She continues to follow with pain management      Other orders  -     chlorthalidone (HYGROTEN) 25 MG Tab; Take 1 tablet (25 mg total) by mouth once daily.  -      labetaloL (NORMODYNE) 300 MG tablet; Take 1 tablet (300 mg total) by mouth 2 (two) times daily.    For skin lesion discussed she has some itching so some scaling will apply  -     ketoconazole (NIZORAL) 2 % cream; Apply topically once daily.  Discussed maybe vitiligo from prior steroid injection she would like a dermatology consult due to her concerns    Follow-up 6 months sooner if concern

## 2024-04-24 NOTE — PROGRESS NOTES
Subjective:      Patient ID: Annika Mac is a 73 y.o. female.    Chief Complaint: Diabetic Foot Exam (4/24/24 - Mamie Cotton MD, PCP)      Annika is a 73 y.o. female who presents to the clinic for evaluation and treatment of high risk feet. Annika has a past medical history of Asthma, Chronic obstructive pulmonary disease, unspecified COPD type (1/14/2022), Constipation (10/3/2019), Deep vein thrombophlebitis of left leg (7/27/2012), Deep vein thrombosis, Diabetic foot ulcer with osteomyelitis, Encounter for blood transfusion, GERD (gastroesophageal reflux disease), Obesity, diabetes, and hypertension syndrome (2/13/2019), Obstructive sleep apnea (7/27/2012), PAD (peripheral artery disease) (11/21/2013), Pressure ulcer of toe of left foot, Type 2 diabetes mellitus with obesity (8/19/2020), and Type 2 diabetes mellitus with peripheral artery disease (8/19/2020). The patient's chief complaint is long, thick toenails. This patient has documented high risk feet requiring routine maintenance secondary to peripheral neuropathy.    PCP: Mamie Cotton MD    Date Last Seen by PCP:   Chief Complaint   Patient presents with    Diabetic Foot Exam     4/24/24 - Mamie Cotton MD, PCP         Current shoe gear:  Affected Foot: Rx diabetic extra depth shoes and custom accommodative insoles     Unaffected Foot: Rx diabetic extra depth shoes and custom accommodative insoles    Hemoglobin A1C   Date Value Ref Range Status   03/05/2024 7.0 (H) 4.0 - 5.6 % Final     Comment:     ADA Screening Guidelines:  5.7-6.4%  Consistent with prediabetes  >or=6.5%  Consistent with diabetes    High levels of fetal hemoglobin interfere with the HbA1C  assay. Heterozygous hemoglobin variants (HbS, HgC, etc)do  not significantly interfere with this assay.   However, presence of multiple variants may affect accuracy.     10/24/2023 7.5 (H) 4.0 - 5.6 % Final     Comment:     ADA Screening Guidelines:  5.7-6.4%   Consistent with prediabetes  >or=6.5%  Consistent with diabetes    High levels of fetal hemoglobin interfere with the HbA1C  assay. Heterozygous hemoglobin variants (HbS, HgC, etc)do  not significantly interfere with this assay.   However, presence of multiple variants may affect accuracy.     01/05/2023 7.7 (H) 4.0 - 5.6 % Final     Comment:     ADA Screening Guidelines:  5.7-6.4%  Consistent with prediabetes  >or=6.5%  Consistent with diabetes    High levels of fetal hemoglobin interfere with the HbA1C  assay. Heterozygous hemoglobin variants (HbS, HgC, etc)do  not significantly interfere with this assay.   However, presence of multiple variants may affect accuracy.         Review of Systems   Constitutional: Negative for chills, fever and malaise/fatigue.   HENT:  Negative for hearing loss.    Cardiovascular:  Positive for leg swelling. Negative for claudication.   Respiratory:  Negative for shortness of breath.    Skin:  Positive for color change, dry skin, nail changes and unusual hair distribution. Negative for flushing and rash.   Musculoskeletal:  Negative for joint pain and myalgias.   Neurological:  Positive for numbness, paresthesias and sensory change. Negative for loss of balance.   Psychiatric/Behavioral:  Negative for altered mental status.            Objective:      Physical Exam  Vitals reviewed.   Constitutional:       Appearance: She is well-developed.   Cardiovascular:      Pulses:           Dorsalis pedis pulses are 0 on the right side and 0 on the left side.        Posterior tibial pulses are 1+ on the right side and 1+ on the left side.   Musculoskeletal:      Right lower leg: Edema present.      Left lower leg: Edema present.      Right ankle: Decreased range of motion. Abnormal pulse.      Right Achilles Tendon: Patel's test negative.      Left ankle: Decreased range of motion. Abnormal pulse.      Left Achilles Tendon: Patel's test negative.      Right foot: Decreased range of motion.       Left foot: Decreased range of motion.   Feet:      Right foot:      Protective Sensation: 5 sites tested.  2 sites sensed.      Left foot:      Protective Sensation: 5 sites tested.  2 sites sensed.   Skin:     General: Skin is warm and dry.      Capillary Refill: Capillary refill takes more than 3 seconds.      Comments: Nails x10 are elongated by 4-68mm's, thickened by 2-3 mm's, dystrophic, and are yellowish in  coloration . Xerosis Bilaterally. No open lesions noted.   HKLs noted to b/L 1st met   Neurological:      Mental Status: She is alert.      Comments: diminished sensation noted to b/L lower extremities   Psychiatric:         Behavior: Behavior normal. Behavior is cooperative.               Assessment:       Encounter Diagnoses   Name Primary?    Diabetic polyneuropathy associated with type 2 diabetes mellitus Yes    Onychomycosis due to dermatophyte     Corn or callus          Plan:       Annika was seen today for diabetic foot exam.    Diagnoses and all orders for this visit:    Diabetic polyneuropathy associated with type 2 diabetes mellitus    Onychomycosis due to dermatophyte    Corn or callus      I counseled the patient on her conditions, their implications and medical management.      - Shoe inspection. Diabetic Foot Education. Patient reminded of the importance of good nutrition and blood sugar control to help prevent podiatric complications of diabetes. Patient instructed on proper foot hygeine. We discussed wearing proper shoe gear, daily foot inspections, never walking without protective shoe gear, never putting sharp instruments to feet, routine podiatric nail visits every 2-3 months.    After cleansing with an alcohol prep pad, the about mentioned hyperkeratotic lesions were sharply debrided X 2 utilizing a #15 blade to a smooth base without incident. Pt tolerated the procedure well and reported comfort to the debarment sites. Pt will continue to use padding and moisture the callused areas.      - With patient's permission, nails were aggressively reduced and debrided x 10 to their soft tissue attachment mechanically and with electric , removing all offending nail and debris. Patient relates relief following the procedure. She will continue to monitor the areas daily, inspect her feet, wear protective shoe gear when ambulatory, moisturizer to maintain skin integrity and follow in this office in approximately 2-3 months, sooner p.r.n.

## 2024-04-25 PROBLEM — G95.9 CERVICAL MYELOPATHY: Status: RESOLVED | Noted: 2020-01-22 | Resolved: 2024-04-25

## 2024-04-25 PROBLEM — I69.351 HEMIPLEGIA AND HEMIPARESIS FOLLOWING CEREBRAL INFARCTION AFFECTING RIGHT DOMINANT SIDE: Status: ACTIVE | Noted: 2024-04-25

## 2024-04-26 ENCOUNTER — TELEPHONE (OUTPATIENT)
Dept: DERMATOLOGY | Facility: CLINIC | Age: 74
End: 2024-04-26
Payer: MEDICARE

## 2024-04-26 NOTE — TELEPHONE ENCOUNTER
I left a voice mail stating the purpose of my call is to assist with booking an appointment with Dr. Carroll.  I informed her that Dr. Barbosa's schedule is full until September.  I suggested logging onto the portal on 05/01/2024 to book an appointment in September.  I also mentioned calling the clinic back or sending the clinic a message with her desired day of the week and time frame she would like to be seen.

## 2024-05-13 DIAGNOSIS — N18.32 STAGE 3B CHRONIC KIDNEY DISEASE: Primary | ICD-10-CM

## 2024-05-22 ENCOUNTER — LAB VISIT (OUTPATIENT)
Dept: LAB | Facility: HOSPITAL | Age: 74
End: 2024-05-22
Attending: INTERNAL MEDICINE
Payer: MEDICARE

## 2024-05-22 ENCOUNTER — OFFICE VISIT (OUTPATIENT)
Dept: HEMATOLOGY/ONCOLOGY | Facility: CLINIC | Age: 74
End: 2024-05-22
Payer: MEDICARE

## 2024-05-22 VITALS
OXYGEN SATURATION: 97 % | BODY MASS INDEX: 50.04 KG/M2 | DIASTOLIC BLOOD PRESSURE: 64 MMHG | HEIGHT: 65 IN | SYSTOLIC BLOOD PRESSURE: 135 MMHG | TEMPERATURE: 98 F | HEART RATE: 65 BPM

## 2024-05-22 DIAGNOSIS — N18.32 STAGE 3B CHRONIC KIDNEY DISEASE: ICD-10-CM

## 2024-05-22 DIAGNOSIS — D63.1 ANEMIA IN STAGE 3 CHRONIC KIDNEY DISEASE, UNSPECIFIED WHETHER STAGE 3A OR 3B CKD: ICD-10-CM

## 2024-05-22 DIAGNOSIS — D50.0 ANEMIA DUE TO CHRONIC BLOOD LOSS: Primary | ICD-10-CM

## 2024-05-22 DIAGNOSIS — N18.30 ANEMIA IN STAGE 3 CHRONIC KIDNEY DISEASE, UNSPECIFIED WHETHER STAGE 3A OR 3B CKD: ICD-10-CM

## 2024-05-22 LAB
ALBUMIN SERPL BCP-MCNC: 3.5 G/DL (ref 3.5–5.2)
ALP SERPL-CCNC: 127 U/L (ref 55–135)
ALT SERPL W/O P-5'-P-CCNC: 15 U/L (ref 10–44)
ANION GAP SERPL CALC-SCNC: 8 MMOL/L (ref 8–16)
AST SERPL-CCNC: 16 U/L (ref 10–40)
BILIRUB SERPL-MCNC: 0.3 MG/DL (ref 0.1–1)
BUN SERPL-MCNC: 32 MG/DL (ref 8–23)
CALCIUM SERPL-MCNC: 9.7 MG/DL (ref 8.7–10.5)
CHLORIDE SERPL-SCNC: 109 MMOL/L (ref 95–110)
CO2 SERPL-SCNC: 24 MMOL/L (ref 23–29)
CREAT SERPL-MCNC: 1.8 MG/DL (ref 0.5–1.4)
CREAT UR-MCNC: 59 MG/DL (ref 15–325)
ERYTHROCYTE [DISTWIDTH] IN BLOOD BY AUTOMATED COUNT: 14.4 % (ref 11.5–14.5)
EST. GFR  (NO RACE VARIABLE): 29.2 ML/MIN/1.73 M^2
FERRITIN SERPL-MCNC: 374 NG/ML (ref 20–300)
GLUCOSE SERPL-MCNC: 225 MG/DL (ref 70–110)
HCT VFR BLD AUTO: 30.6 % (ref 37–48.5)
HGB BLD-MCNC: 9.9 G/DL (ref 12–16)
IMM GRANULOCYTES # BLD AUTO: 0.03 K/UL (ref 0–0.04)
MCH RBC QN AUTO: 28.3 PG (ref 27–31)
MCHC RBC AUTO-ENTMCNC: 32.4 G/DL (ref 32–36)
MCV RBC AUTO: 87 FL (ref 82–98)
NEUTROPHILS # BLD AUTO: 3.3 K/UL (ref 1.8–7.7)
PLATELET # BLD AUTO: 236 K/UL (ref 150–450)
PMV BLD AUTO: 10.8 FL (ref 9.2–12.9)
POTASSIUM SERPL-SCNC: 4.8 MMOL/L (ref 3.5–5.1)
PROT SERPL-MCNC: 6.3 G/DL (ref 6–8.4)
PROT UR-MCNC: <7 MG/DL (ref 0–15)
PROT/CREAT UR: NORMAL MG/G{CREAT} (ref 0–0.2)
PTH-INTACT SERPL-MCNC: 129.4 PG/ML (ref 9–77)
RBC # BLD AUTO: 3.5 M/UL (ref 4–5.4)
SODIUM SERPL-SCNC: 141 MMOL/L (ref 136–145)
WBC # BLD AUTO: 5.74 K/UL (ref 3.9–12.7)

## 2024-05-22 PROCEDURE — 3060F POS MICROALBUMINURIA REV: CPT | Mod: CPTII,S$GLB,, | Performed by: INTERNAL MEDICINE

## 2024-05-22 PROCEDURE — 3066F NEPHROPATHY DOC TX: CPT | Mod: CPTII,S$GLB,, | Performed by: INTERNAL MEDICINE

## 2024-05-22 PROCEDURE — 80053 COMPREHEN METABOLIC PANEL: CPT | Performed by: INTERNAL MEDICINE

## 2024-05-22 PROCEDURE — 85027 COMPLETE CBC AUTOMATED: CPT | Performed by: INTERNAL MEDICINE

## 2024-05-22 PROCEDURE — 99999 PR PBB SHADOW E&M-EST. PATIENT-LVL V: CPT | Mod: PBBFAC,,, | Performed by: INTERNAL MEDICINE

## 2024-05-22 PROCEDURE — 1159F MED LIST DOCD IN RCRD: CPT | Mod: CPTII,S$GLB,, | Performed by: INTERNAL MEDICINE

## 2024-05-22 PROCEDURE — 3288F FALL RISK ASSESSMENT DOCD: CPT | Mod: CPTII,S$GLB,, | Performed by: INTERNAL MEDICINE

## 2024-05-22 PROCEDURE — 82728 ASSAY OF FERRITIN: CPT | Performed by: INTERNAL MEDICINE

## 2024-05-22 PROCEDURE — 3075F SYST BP GE 130 - 139MM HG: CPT | Mod: CPTII,S$GLB,, | Performed by: INTERNAL MEDICINE

## 2024-05-22 PROCEDURE — 36415 COLL VENOUS BLD VENIPUNCTURE: CPT | Performed by: INTERNAL MEDICINE

## 2024-05-22 PROCEDURE — 3051F HG A1C>EQUAL 7.0%<8.0%: CPT | Mod: CPTII,S$GLB,, | Performed by: INTERNAL MEDICINE

## 2024-05-22 PROCEDURE — 83970 ASSAY OF PARATHORMONE: CPT | Performed by: INTERNAL MEDICINE

## 2024-05-22 PROCEDURE — 3078F DIAST BP <80 MM HG: CPT | Mod: CPTII,S$GLB,, | Performed by: INTERNAL MEDICINE

## 2024-05-22 PROCEDURE — 99214 OFFICE O/P EST MOD 30 MIN: CPT | Mod: S$GLB,,, | Performed by: INTERNAL MEDICINE

## 2024-05-22 PROCEDURE — 84156 ASSAY OF PROTEIN URINE: CPT | Performed by: INTERNAL MEDICINE

## 2024-05-22 PROCEDURE — 4010F ACE/ARB THERAPY RXD/TAKEN: CPT | Mod: CPTII,S$GLB,, | Performed by: INTERNAL MEDICINE

## 2024-05-22 PROCEDURE — 1126F AMNT PAIN NOTED NONE PRSNT: CPT | Mod: CPTII,S$GLB,, | Performed by: INTERNAL MEDICINE

## 2024-05-22 PROCEDURE — 1101F PT FALLS ASSESS-DOCD LE1/YR: CPT | Mod: CPTII,S$GLB,, | Performed by: INTERNAL MEDICINE

## 2024-05-22 NOTE — PROGRESS NOTES
Subjective:       Patient ID: Annika Mac is a 73 y.o. female.    Chief Complaint: Anemia in stage 3 chronic kidney disease, unspecified wheth    Anemia     Mrs. Mac returns today for follow up.  I had last seen her on November 20, 2023.  Briefly, she is a 73 year old morbidly obese female who comes today for follow up for her longstanding anemia.    Multiple stool samples in the past have been negative for occult blood.   Of note, she had an EGD, colonoscopy, and video capsule swallow 4 years ago.  A tubular adenoma was removed from her colon while her EGD had shown patchy mildly erythematous mucosa without bleeding in the gastric body.  Biopsies were negative for malignancy.  The video capsule swallow was unremarkable although it was not an optimal study.    Her CBC today shows a white count of 5,200 per cubic mm, hemoglobin 9.9 grams/deciliter, hematocrit 30.6 %, MCV 87, and platelets 236K.  CMP shows a creatinine of 1.8 mg/dL with a BUN of 30.  Her eGFR is 29.6 ml/min  A recent EGD in April 2023 was normal.  Colonoscopy showed 4 polyps that were removed. Biopsy of all 4 of them showed tubular adenomas.  As mentioned above, several stool samples have been negative for occult blood.    Review of Systems  Overall she feels fair. She again complains of her longstanding arthritic pains involving primarily her knees, her back, and her hips.  She uses a wheelchair for ambulation.  She complains of shortness of breath with exertion.  She denies any anxiety, depression, easy bruising, fevers, chills, night sweats, weight loss, nausea, vomiting, diarrhea, diplopia, blurred vision, epistaxis, hematuria, or headaches.      Objective:      Physical Exam  GENERAL: She is alert, oriented to time, place, person, pleasant, well nourished, in no acute physical distress.  She is able to climb to the examination table with assistance.  VITAL SIGNS: Reviewed.   HEENT: Normal. There are no nasal, oral, lip, gingival, auricular,  lid, conjunctival lesions. Pupils are equal, reactive to light and   accommodation and extraocular muscle movements are intact. Mucosae are moist and pink, and there is no thrush.  Maxillary teeth are missing.  NECK: Supple without JVD, adenopathy, or thyromegaly.   LUNGS: Clear to auscultation without wheezing, rales, or rhonchi.   CARDIOVASCULAR: Reveals an S1, S2, no murmurs, no rubs, no gallops.   ABDOMEN: Obese, soft, nontender, without organomegaly. Bowel sounds are present. A median abdominal scar is seen extending from the umbilicus to the symphysis pubis.   EXTREMITIES: No cyanosis or clubbing. There is 1(+) edema at the ankle level bilaterally.  SKIN: Does not have petechiae, rashes, induration, or ecchymoses.   NEUROLOGIC: Motor function is 5/5, DTRs are 0 to 1+ bilaterally, symmetrical, and cranial nerves within normal limits.   LYMPHATIC: There is no cervical, axillary, or supraclavicular adenopathy.       Assessment:       1. Anemia in stage 3 chronic kidney disease      2. Morbid obesity due to excess calories     3. 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 Mac.      We again discussed the option of a bone marrow biopsy but she opted to defer it for later.  Her creatinine is 1.8 mg/dL, however, the eGFR is below 30 mL/minute.  We will begin the authorization process for erythropoietin.  Assuming we are able to authorize the erythropoietin injections, I will see her after a series of 3 injections.  Her multiple questions were answered to her satisfaction.  I spent approximately 25 minutes reviewing the available records and 15 minutes evaluating the patient, out of which over 50% of the time was spent face to face with the patient in counseling and coordinating this patient's care.      Route Chart for Scheduling    Med Onc Chart Routing      Follow up with physician 4 weeks. Please obtain authorization for EPO injections.  I need her to  get the 1st 1 in a week and I need to see her after she has received 3 injections with a repeat CBC   Follow up with KATIE    Infusion scheduling note    Injection scheduling note    Labs    Imaging    Pharmacy appointment    Other referrals          Therapy Plan Information  None

## 2024-05-23 ENCOUNTER — OFFICE VISIT (OUTPATIENT)
Dept: NEPHROLOGY | Facility: CLINIC | Age: 74
End: 2024-05-23
Payer: MEDICARE

## 2024-05-23 VITALS
OXYGEN SATURATION: 98 % | HEIGHT: 65 IN | DIASTOLIC BLOOD PRESSURE: 74 MMHG | WEIGHT: 293 LBS | BODY MASS INDEX: 48.82 KG/M2 | SYSTOLIC BLOOD PRESSURE: 136 MMHG | HEART RATE: 73 BPM

## 2024-05-23 DIAGNOSIS — N18.4 STAGE 4 CHRONIC KIDNEY DISEASE: ICD-10-CM

## 2024-05-23 PROCEDURE — 3051F HG A1C>EQUAL 7.0%<8.0%: CPT | Mod: CPTII,S$GLB,, | Performed by: INTERNAL MEDICINE

## 2024-05-23 PROCEDURE — 1125F AMNT PAIN NOTED PAIN PRSNT: CPT | Mod: CPTII,S$GLB,, | Performed by: INTERNAL MEDICINE

## 2024-05-23 PROCEDURE — 3060F POS MICROALBUMINURIA REV: CPT | Mod: CPTII,S$GLB,, | Performed by: INTERNAL MEDICINE

## 2024-05-23 PROCEDURE — 1159F MED LIST DOCD IN RCRD: CPT | Mod: CPTII,S$GLB,, | Performed by: INTERNAL MEDICINE

## 2024-05-23 PROCEDURE — 99999 PR PBB SHADOW E&M-EST. PATIENT-LVL V: CPT | Mod: PBBFAC,,, | Performed by: INTERNAL MEDICINE

## 2024-05-23 PROCEDURE — 4010F ACE/ARB THERAPY RXD/TAKEN: CPT | Mod: CPTII,S$GLB,, | Performed by: INTERNAL MEDICINE

## 2024-05-23 PROCEDURE — 3066F NEPHROPATHY DOC TX: CPT | Mod: CPTII,S$GLB,, | Performed by: INTERNAL MEDICINE

## 2024-05-23 PROCEDURE — 3075F SYST BP GE 130 - 139MM HG: CPT | Mod: CPTII,S$GLB,, | Performed by: INTERNAL MEDICINE

## 2024-05-23 PROCEDURE — 1101F PT FALLS ASSESS-DOCD LE1/YR: CPT | Mod: CPTII,S$GLB,, | Performed by: INTERNAL MEDICINE

## 2024-05-23 PROCEDURE — 3078F DIAST BP <80 MM HG: CPT | Mod: CPTII,S$GLB,, | Performed by: INTERNAL MEDICINE

## 2024-05-23 PROCEDURE — 99214 OFFICE O/P EST MOD 30 MIN: CPT | Mod: S$GLB,,, | Performed by: INTERNAL MEDICINE

## 2024-05-23 PROCEDURE — 3288F FALL RISK ASSESSMENT DOCD: CPT | Mod: CPTII,S$GLB,, | Performed by: INTERNAL MEDICINE

## 2024-05-23 NOTE — PROGRESS NOTES
Subjective:       Patient ID: Annika Mac is a 73 y.o. Black or  female who presents for follow-up evaluation of No chief complaint on file.    Gaudencio is a 73 -year-old -American female with longstanding diabetes, hypertension, asthma h/o DVT, GERD, RUFINA, PAD , and CKD is coming in for followup of these. The patient states that she is doing well except diabetic neuropathy.  Her blood pressures are in the 120's-130's/60's. She states her diabetes is stable with A1c of 7 recently.  She denies any frequency, urgency, hematuria, dysuria, nausea, vomiting, chest pain or shortness of breath. Denies NSAID's. Pt used to be seen by Dr. Alcazar and was last seen in 2021.      PAST MEDICAL HISTORY: Significant for diabetes for 17 years; hypertension   for 5 years; obstructive sleep apnea, does not wear a CPAP; CKD; total   knee replacement in 2003; and history of DVT, PAD, asthma    ALLERGIES: Please see allergy card.     MEDICATIONS: Meds reviewed. Please see MedCard.     SOCIAL HISTORY: Does not smoke. Does not drink.     FAMILY HISTORY: No family history of kidney problems.         Review of Systems   Constitutional:  Negative for fatigue.   Eyes:  Negative for discharge.   Respiratory:  Negative for cough, shortness of breath and wheezing.    Cardiovascular:  Negative for chest pain and palpitations.   Gastrointestinal:  Negative for abdominal pain, diarrhea, nausea and vomiting.   Genitourinary:  Negative for dysuria, frequency, hematuria and urgency.   Musculoskeletal:         [place] Knee pain   Skin:  Negative for color change and rash.   Neurological:  Negative for numbness.   Psychiatric/Behavioral:  Negative for confusion.        Objective:      Physical Exam  Constitutional:       Appearance: She is well-developed.   Cardiovascular:      Rate and Rhythm: Normal rate and regular rhythm.      Heart sounds: No murmur heard.     No friction rub.   Pulmonary:      Effort: Pulmonary effort is normal.  No respiratory distress.      Breath sounds: Normal breath sounds. No rales.   Abdominal:      General: Bowel sounds are normal.      Palpations: Abdomen is soft.      Tenderness: There is no abdominal tenderness. There is no rebound.   Skin:     Findings: No erythema or rash.   Neurological:      Mental Status: She is alert and oriented to person, place, and time.   Psychiatric:         Judgment: Judgment normal.       Assessment:       1. Stage 4 chronic kidney disease        Plan:         This is a 73-year-old -American female with   longstanding diabetes, hypertension and CKD is coming in for followup.   1. CKD stage 4 with an MDRD GFR of 29 mL/min. Her creatinine is stable   at 1.8  with a baseline of 1.6-1.8. Her CKD is secondary to longstanding hypertension and diabetes.   2. Diabetes. On avapro.  No proteinuria.   3. Hypertension. Blood pressures are stable. Continue with the current   regimen.   4. Anemia. H&H is stable. She is monitored in hem/onc.   5. Renal osteodystrophy. Her intact PTH is stable.  Rec OTC vit d everyday.  6.  Acidosis: on sod bicarb every other day per pt.   We will see the patient back again in 5 months. She can f/u with NP.

## 2024-05-30 ENCOUNTER — INFUSION (OUTPATIENT)
Dept: INFUSION THERAPY | Facility: HOSPITAL | Age: 74
End: 2024-05-30
Payer: MEDICARE

## 2024-05-30 VITALS — DIASTOLIC BLOOD PRESSURE: 58 MMHG | SYSTOLIC BLOOD PRESSURE: 121 MMHG | RESPIRATION RATE: 16 BRPM | HEART RATE: 69 BPM

## 2024-05-30 DIAGNOSIS — N18.9 ANEMIA OF RENAL DISEASE: Primary | ICD-10-CM

## 2024-05-30 DIAGNOSIS — D63.1 ANEMIA OF RENAL DISEASE: Primary | ICD-10-CM

## 2024-05-30 PROCEDURE — 96372 THER/PROPH/DIAG INJ SC/IM: CPT

## 2024-05-30 PROCEDURE — 63600175 PHARM REV CODE 636 W HCPCS: Mod: JZ,EC,JG | Performed by: INTERNAL MEDICINE

## 2024-05-30 RX ADMIN — EPOETIN ALFA-EPBX 40000 UNITS: 40000 INJECTION, SOLUTION INTRAVENOUS; SUBCUTANEOUS at 02:05

## 2024-05-30 NOTE — NURSING
Administered Retacrit in abdominal tissue. No questions or concerns. Pt left unit in WC accompanied by her .

## 2024-06-06 ENCOUNTER — INFUSION (OUTPATIENT)
Dept: INFUSION THERAPY | Facility: HOSPITAL | Age: 74
End: 2024-06-06
Payer: MEDICARE

## 2024-06-06 VITALS — HEART RATE: 71 BPM | RESPIRATION RATE: 18 BRPM | SYSTOLIC BLOOD PRESSURE: 144 MMHG | DIASTOLIC BLOOD PRESSURE: 67 MMHG

## 2024-06-06 DIAGNOSIS — N18.9 ANEMIA OF RENAL DISEASE: Primary | ICD-10-CM

## 2024-06-06 DIAGNOSIS — D63.1 ANEMIA OF RENAL DISEASE: Primary | ICD-10-CM

## 2024-06-06 PROCEDURE — 63600175 PHARM REV CODE 636 W HCPCS: Mod: JZ,EC,JG | Performed by: INTERNAL MEDICINE

## 2024-06-06 PROCEDURE — 96372 THER/PROPH/DIAG INJ SC/IM: CPT

## 2024-06-06 RX ADMIN — EPOETIN ALFA-EPBX 40000 UNITS: 40000 INJECTION, SOLUTION INTRAVENOUS; SUBCUTANEOUS at 02:06

## 2024-06-10 ENCOUNTER — PATIENT MESSAGE (OUTPATIENT)
Dept: INTERNAL MEDICINE | Facility: CLINIC | Age: 74
End: 2024-06-10
Payer: MEDICARE

## 2024-06-11 ENCOUNTER — TELEPHONE (OUTPATIENT)
Dept: INTERNAL MEDICINE | Facility: CLINIC | Age: 74
End: 2024-06-11
Payer: MEDICARE

## 2024-06-11 DIAGNOSIS — I73.9 PAD (PERIPHERAL ARTERY DISEASE): Primary | ICD-10-CM

## 2024-06-11 NOTE — TELEPHONE ENCOUNTER
Pt states she was waiting her xray results regarding her PAD in legs from [facility]. Pt states she was informed [facility] spoke with PCP regarding results in April.

## 2024-06-11 NOTE — TELEPHONE ENCOUNTER
----- Message from Lakshmi Page sent at 6/11/2024 11:24 AM CDT -----  Contact: [phone]  Calling to get test results.   Name of test (lab, x-ray):   Date of test: 04/19/24  Where was the test performed: [facility]   Would you like a call back, or a response through your [facility] portal?:    call back   Comments:      1MEDICALADVICE     Patient is calling for Medical Advice regarding: both legs hurt and burn     How long has patient had these symptoms:    Pharmacy name and phone#:  [facility] [address] [place] [phone] [facility] [phone] [facility] [phone]  [facility] [address]  Phone: [phone] Fax: [phone]    Would like response via [employer]:  call back     Comments:

## 2024-06-11 NOTE — TELEPHONE ENCOUNTER
Spoke with pt   She is having leg pain   Underwent study at [facility] for ckd and pad    Letter receiveds showd 50-75%stnosis of left dorasalis pedis artery on their report   Discussed with pt   Continue statin    Referral placed assist in vascular cardiology appt

## 2024-06-13 ENCOUNTER — INFUSION (OUTPATIENT)
Dept: INFUSION THERAPY | Facility: HOSPITAL | Age: 74
End: 2024-06-13
Payer: MEDICARE

## 2024-06-13 VITALS — HEART RATE: 64 BPM | DIASTOLIC BLOOD PRESSURE: 61 MMHG | SYSTOLIC BLOOD PRESSURE: 130 MMHG

## 2024-06-13 DIAGNOSIS — D63.1 ANEMIA OF RENAL DISEASE: Primary | ICD-10-CM

## 2024-06-13 DIAGNOSIS — N18.9 ANEMIA OF RENAL DISEASE: Primary | ICD-10-CM

## 2024-06-13 PROCEDURE — 63600175 PHARM REV CODE 636 W HCPCS: Mod: JZ,EC,JG | Performed by: INTERNAL MEDICINE

## 2024-06-13 PROCEDURE — 96372 THER/PROPH/DIAG INJ SC/IM: CPT

## 2024-06-13 RX ADMIN — EPOETIN ALFA-EPBX 40000 UNITS: 40000 INJECTION, SOLUTION INTRAVENOUS; SUBCUTANEOUS at 01:06

## 2024-06-13 NOTE — NURSING
Pt here for retacrit injection.  Labs reviewed and VSS.  Retacrit administered to L arm.  Pt tolerated.

## 2024-06-20 ENCOUNTER — OFFICE VISIT (OUTPATIENT)
Dept: HEMATOLOGY/ONCOLOGY | Facility: CLINIC | Age: 74
End: 2024-06-20
Payer: MEDICARE

## 2024-06-20 ENCOUNTER — LAB VISIT (OUTPATIENT)
Dept: LAB | Facility: HOSPITAL | Age: 74
End: 2024-06-20
Attending: INTERNAL MEDICINE
Payer: MEDICARE

## 2024-06-20 VITALS
BODY MASS INDEX: 48.82 KG/M2 | OXYGEN SATURATION: 98 % | SYSTOLIC BLOOD PRESSURE: 142 MMHG | WEIGHT: 293 LBS | HEART RATE: 69 BPM | DIASTOLIC BLOOD PRESSURE: 66 MMHG | TEMPERATURE: 99 F | HEIGHT: 65 IN

## 2024-06-20 DIAGNOSIS — D63.1 ANEMIA IN STAGE 3 CHRONIC KIDNEY DISEASE, UNSPECIFIED WHETHER STAGE 3A OR 3B CKD: Primary | ICD-10-CM

## 2024-06-20 DIAGNOSIS — D50.0 ANEMIA DUE TO CHRONIC BLOOD LOSS: ICD-10-CM

## 2024-06-20 DIAGNOSIS — N18.30 ANEMIA IN STAGE 3 CHRONIC KIDNEY DISEASE, UNSPECIFIED WHETHER STAGE 3A OR 3B CKD: Primary | ICD-10-CM

## 2024-06-20 LAB
BASOPHILS # BLD AUTO: 0.05 K/UL (ref 0–0.2)
BASOPHILS NFR BLD: 0.8 % (ref 0–1.9)
DIFFERENTIAL METHOD BLD: ABNORMAL
EOSINOPHIL # BLD AUTO: 0.3 K/UL (ref 0–0.5)
EOSINOPHIL NFR BLD: 4.5 % (ref 0–8)
ERYTHROCYTE [DISTWIDTH] IN BLOOD BY AUTOMATED COUNT: 16.1 % (ref 11.5–14.5)
HCT VFR BLD AUTO: 37.3 % (ref 37–48.5)
HGB BLD-MCNC: 11.2 G/DL (ref 12–16)
IMM GRANULOCYTES # BLD AUTO: 0.03 K/UL (ref 0–0.04)
IMM GRANULOCYTES NFR BLD AUTO: 0.5 % (ref 0–0.5)
LYMPHOCYTES # BLD AUTO: 1.3 K/UL (ref 1–4.8)
LYMPHOCYTES NFR BLD: 21 % (ref 18–48)
MCH RBC QN AUTO: 27.3 PG (ref 27–31)
MCHC RBC AUTO-ENTMCNC: 30 G/DL (ref 32–36)
MCV RBC AUTO: 91 FL (ref 82–98)
MONOCYTES # BLD AUTO: 0.7 K/UL (ref 0.3–1)
MONOCYTES NFR BLD: 10.7 % (ref 4–15)
NEUTROPHILS # BLD AUTO: 3.9 K/UL (ref 1.8–7.7)
NEUTROPHILS NFR BLD: 62.5 % (ref 38–73)
NRBC BLD-RTO: 0 /100 WBC
PLATELET # BLD AUTO: 260 K/UL (ref 150–450)
PMV BLD AUTO: 10.1 FL (ref 9.2–12.9)
RBC # BLD AUTO: 4.11 M/UL (ref 4–5.4)
WBC # BLD AUTO: 6.24 K/UL (ref 3.9–12.7)

## 2024-06-20 PROCEDURE — 99213 OFFICE O/P EST LOW 20 MIN: CPT | Mod: S$GLB,,, | Performed by: PHYSICIAN ASSISTANT

## 2024-06-20 PROCEDURE — 1126F AMNT PAIN NOTED NONE PRSNT: CPT | Mod: CPTII,S$GLB,, | Performed by: PHYSICIAN ASSISTANT

## 2024-06-20 PROCEDURE — 3078F DIAST BP <80 MM HG: CPT | Mod: CPTII,S$GLB,, | Performed by: PHYSICIAN ASSISTANT

## 2024-06-20 PROCEDURE — 36415 COLL VENOUS BLD VENIPUNCTURE: CPT | Performed by: INTERNAL MEDICINE

## 2024-06-20 PROCEDURE — 99999 PR PBB SHADOW E&M-EST. PATIENT-LVL III: CPT | Mod: PBBFAC,,, | Performed by: PHYSICIAN ASSISTANT

## 2024-06-20 PROCEDURE — 3077F SYST BP >= 140 MM HG: CPT | Mod: CPTII,S$GLB,, | Performed by: PHYSICIAN ASSISTANT

## 2024-06-20 PROCEDURE — 3060F POS MICROALBUMINURIA REV: CPT | Mod: CPTII,S$GLB,, | Performed by: PHYSICIAN ASSISTANT

## 2024-06-20 PROCEDURE — 3051F HG A1C>EQUAL 7.0%<8.0%: CPT | Mod: CPTII,S$GLB,, | Performed by: PHYSICIAN ASSISTANT

## 2024-06-20 PROCEDURE — 3008F BODY MASS INDEX DOCD: CPT | Mod: CPTII,S$GLB,, | Performed by: PHYSICIAN ASSISTANT

## 2024-06-20 PROCEDURE — 4010F ACE/ARB THERAPY RXD/TAKEN: CPT | Mod: CPTII,S$GLB,, | Performed by: PHYSICIAN ASSISTANT

## 2024-06-20 PROCEDURE — 85025 COMPLETE CBC W/AUTO DIFF WBC: CPT | Performed by: INTERNAL MEDICINE

## 2024-06-20 PROCEDURE — 3066F NEPHROPATHY DOC TX: CPT | Mod: CPTII,S$GLB,, | Performed by: PHYSICIAN ASSISTANT

## 2024-06-20 NOTE — PROGRESS NOTES
"Subjective     Patient ID: Annika Mac is a 73 y.o. female.    Chief Complaint: No chief complaint on file.    Mrs. aMc returns today for follow up.  She was last seen by Dr. Rodriguez in late may for her longstanding anemia. At that visit she was started on erythropoietin and received 3 weeks of treatment.      Patient notes that when she started Epo she developed profound fatigue.  Sleeping most of the day and finding it difficult to complete ADLs.  She did not have this fatigue prior to injections.  No abnormal bruising or bleeding.  Appetite is good.  No pain.  She has some baseline MCFADDEN.  She has appt with Cardiology later this week to discuss PAD, notes bilateral lower extremities with "heavy" sensation".        Briefly, she is a 73 year old morbidly obese female who comes today for follow up for her longstanding anemia.    Multiple stool samples in the past have been negative for occult blood.   Of note, she had an EGD, colonoscopy, and video capsule swallow 4 years ago.  A tubular adenoma was removed from her colon while her EGD had shown patchy mildly erythematous mucosa without bleeding in the gastric body.  Biopsies were negative for malignancy.  The video capsule swallow was unremarkable although it was not an optimal study.       Lab Results       Component                Value               Date                       WBC                      6.24                06/20/2024                 HGB                      11.2 (L)            06/20/2024                 HCT                      37.3                06/20/2024                 MCV                      91                  06/20/2024                 PLT                      260                 06/20/2024                    A recent EGD in April 2023 was normal.  Colonoscopy showed 4 polyps that were removed. Biopsy of all 4 of them showed tubular adenomas.  As mentioned above, several stool samples have been negative for occult blood.      FOBT " from today was negative.            Review of Systems   Constitutional:  Positive for fatigue.   HENT:  Negative for mouth sores.    Respiratory:  Positive for shortness of breath (baseline MCFADDEN). Negative for chest tightness.    Cardiovascular:  Positive for leg swelling (bilatearl lower extremity edema- see HPI).   Gastrointestinal:  Negative for abdominal pain, blood in stool, nausea and vomiting.   Genitourinary:  Negative for dysuria, hematuria and vaginal bleeding.   Neurological:  Negative for dizziness and headaches.   Hematological:  Negative for adenopathy.   Psychiatric/Behavioral:  Negative for dysphoric mood.           Objective     Physical Exam  Constitutional:       Appearance: She is well-developed.      Comments: Presents alone  Ambulates with walker     HENT:      Head: Normocephalic and atraumatic.      Mouth/Throat:      Pharynx: No oropharyngeal exudate.   Eyes:      General: No scleral icterus.     Conjunctiva/sclera: Conjunctivae normal.      Pupils: Pupils are equal, round, and reactive to light.   Neck:      Thyroid: No thyromegaly.      Vascular: No JVD.   Cardiovascular:      Rate and Rhythm: Normal rate and regular rhythm.      Heart sounds: No murmur heard.     No friction rub. No gallop.   Pulmonary:      Effort: Pulmonary effort is normal.      Breath sounds: Normal breath sounds. No wheezing or rales.   Chest:      Chest wall: No tenderness.   Abdominal:      General: Bowel sounds are normal. There is no distension.      Palpations: Abdomen is soft. There is no mass.      Tenderness: There is no abdominal tenderness.   Musculoskeletal:         General: No tenderness. Normal range of motion.      Cervical back: Normal range of motion.      Comments: Bilateral lower extremity edema (1+)   Lymphadenopathy:      Cervical: No cervical adenopathy.   Skin:     General: Skin is warm and dry.      Coloration: Skin is not pale.      Findings: No erythema or rash.   Neurological:      Mental  Status: She is alert and oriented to person, place, and time.      Cranial Nerves: No cranial nerve deficit.      Coordination: Coordination normal.      Deep Tendon Reflexes: Reflexes are normal and symmetric.            Assessment and Plan     1. Anemia in stage 3 chronic kidney disease, unspecified whether stage 3a or 3b CKD  -     CBC Oncology; Future; Expected date: 06/20/2024      1)Anemia in CKD: Patient with response to EPO injections as seen on today's labs.  Hold Epo today given that Hemoglobin is > 11 g/dL  RTC in 6 weeks with repeat CBC.   FOBT Card provided to patient which she will return at next visit.   Patient knows to call in interim if any issues.     No follow-ups on file.

## 2024-06-26 ENCOUNTER — OFFICE VISIT (OUTPATIENT)
Dept: DERMATOLOGY | Facility: CLINIC | Age: 74
End: 2024-06-26
Payer: MEDICARE

## 2024-06-26 ENCOUNTER — OFFICE VISIT (OUTPATIENT)
Dept: CARDIOLOGY | Facility: CLINIC | Age: 74
End: 2024-06-26
Payer: MEDICARE

## 2024-06-26 VITALS
SYSTOLIC BLOOD PRESSURE: 140 MMHG | OXYGEN SATURATION: 98 % | HEIGHT: 65 IN | DIASTOLIC BLOOD PRESSURE: 60 MMHG | BODY MASS INDEX: 48.82 KG/M2 | WEIGHT: 293 LBS | HEART RATE: 84 BPM

## 2024-06-26 DIAGNOSIS — I13.0 HYPERTENSIVE HEART AND CHRONIC KIDNEY DISEASE WITH HEART FAILURE AND STAGE 1 THROUGH STAGE 4 CHRONIC KIDNEY DISEASE, OR UNSPECIFIED CHRONIC KIDNEY DISEASE: ICD-10-CM

## 2024-06-26 DIAGNOSIS — I73.9 PAD (PERIPHERAL ARTERY DISEASE): ICD-10-CM

## 2024-06-26 DIAGNOSIS — I69.351 HEMIPLEGIA AND HEMIPARESIS FOLLOWING CEREBRAL INFARCTION AFFECTING RIGHT DOMINANT SIDE: ICD-10-CM

## 2024-06-26 DIAGNOSIS — E11.69 DYSLIPIDEMIA ASSOCIATED WITH TYPE 2 DIABETES MELLITUS: ICD-10-CM

## 2024-06-26 DIAGNOSIS — L82.1 SEBORRHEIC KERATOSES: Primary | ICD-10-CM

## 2024-06-26 DIAGNOSIS — L80 VITILIGO: ICD-10-CM

## 2024-06-26 DIAGNOSIS — R07.89 NON-CARDIAC CHEST PAIN: Primary | ICD-10-CM

## 2024-06-26 DIAGNOSIS — E11.3513 TYPE 2 DIABETES MELLITUS WITH BOTH EYES AFFECTED BY PROLIFERATIVE RETINOPATHY AND MACULAR EDEMA, WITH LONG-TERM CURRENT USE OF INSULIN: ICD-10-CM

## 2024-06-26 DIAGNOSIS — E66.01 MORBID OBESITY: ICD-10-CM

## 2024-06-26 DIAGNOSIS — I77.819 ECTATIC AORTA: ICD-10-CM

## 2024-06-26 DIAGNOSIS — J44.9 CHRONIC OBSTRUCTIVE PULMONARY DISEASE, UNSPECIFIED COPD TYPE: ICD-10-CM

## 2024-06-26 DIAGNOSIS — M94.0 COSTOCHONDRITIS: ICD-10-CM

## 2024-06-26 DIAGNOSIS — Z79.4 TYPE 2 DIABETES MELLITUS WITH BOTH EYES AFFECTED BY PROLIFERATIVE RETINOPATHY AND MACULAR EDEMA, WITH LONG-TERM CURRENT USE OF INSULIN: ICD-10-CM

## 2024-06-26 DIAGNOSIS — I10 ESSENTIAL HYPERTENSION: ICD-10-CM

## 2024-06-26 DIAGNOSIS — N18.32 STAGE 3B CHRONIC KIDNEY DISEASE: ICD-10-CM

## 2024-06-26 DIAGNOSIS — E66.01 CLASS 3 SEVERE OBESITY DUE TO EXCESS CALORIES WITH SERIOUS COMORBIDITY AND BODY MASS INDEX (BMI) OF 50.0 TO 59.9 IN ADULT: ICD-10-CM

## 2024-06-26 DIAGNOSIS — I51.89 DIASTOLIC DYSFUNCTION: Chronic | ICD-10-CM

## 2024-06-26 DIAGNOSIS — E78.5 DYSLIPIDEMIA ASSOCIATED WITH TYPE 2 DIABETES MELLITUS: ICD-10-CM

## 2024-06-26 PROCEDURE — 99999 PR PBB SHADOW E&M-EST. PATIENT-LVL III: CPT | Mod: PBBFAC,,, | Performed by: STUDENT IN AN ORGANIZED HEALTH CARE EDUCATION/TRAINING PROGRAM

## 2024-06-26 PROCEDURE — 3066F NEPHROPATHY DOC TX: CPT | Mod: CPTII,S$GLB,, | Performed by: STUDENT IN AN ORGANIZED HEALTH CARE EDUCATION/TRAINING PROGRAM

## 2024-06-26 PROCEDURE — 1160F RVW MEDS BY RX/DR IN RCRD: CPT | Mod: CPTII,S$GLB,, | Performed by: STUDENT IN AN ORGANIZED HEALTH CARE EDUCATION/TRAINING PROGRAM

## 2024-06-26 PROCEDURE — 3051F HG A1C>EQUAL 7.0%<8.0%: CPT | Mod: CPTII,S$GLB,, | Performed by: STUDENT IN AN ORGANIZED HEALTH CARE EDUCATION/TRAINING PROGRAM

## 2024-06-26 PROCEDURE — 99214 OFFICE O/P EST MOD 30 MIN: CPT | Mod: S$GLB,,, | Performed by: STUDENT IN AN ORGANIZED HEALTH CARE EDUCATION/TRAINING PROGRAM

## 2024-06-26 PROCEDURE — 4010F ACE/ARB THERAPY RXD/TAKEN: CPT | Mod: CPTII,S$GLB,, | Performed by: STUDENT IN AN ORGANIZED HEALTH CARE EDUCATION/TRAINING PROGRAM

## 2024-06-26 PROCEDURE — 99999 PR PBB SHADOW E&M-EST. PATIENT-LVL V: CPT | Mod: PBBFAC,,, | Performed by: INTERNAL MEDICINE

## 2024-06-26 PROCEDURE — 1126F AMNT PAIN NOTED NONE PRSNT: CPT | Mod: CPTII,S$GLB,, | Performed by: STUDENT IN AN ORGANIZED HEALTH CARE EDUCATION/TRAINING PROGRAM

## 2024-06-26 PROCEDURE — 1101F PT FALLS ASSESS-DOCD LE1/YR: CPT | Mod: CPTII,S$GLB,, | Performed by: STUDENT IN AN ORGANIZED HEALTH CARE EDUCATION/TRAINING PROGRAM

## 2024-06-26 PROCEDURE — 1159F MED LIST DOCD IN RCRD: CPT | Mod: CPTII,S$GLB,, | Performed by: STUDENT IN AN ORGANIZED HEALTH CARE EDUCATION/TRAINING PROGRAM

## 2024-06-26 PROCEDURE — 3060F POS MICROALBUMINURIA REV: CPT | Mod: CPTII,S$GLB,, | Performed by: STUDENT IN AN ORGANIZED HEALTH CARE EDUCATION/TRAINING PROGRAM

## 2024-06-26 PROCEDURE — 3288F FALL RISK ASSESSMENT DOCD: CPT | Mod: CPTII,S$GLB,, | Performed by: STUDENT IN AN ORGANIZED HEALTH CARE EDUCATION/TRAINING PROGRAM

## 2024-06-26 RX ORDER — MOMETASONE FUROATE 1 MG/G
OINTMENT TOPICAL DAILY
Qty: 60 G | Refills: 1 | Status: SHIPPED | OUTPATIENT
Start: 2024-06-26 | End: 2024-06-26

## 2024-06-26 RX ORDER — MOMETASONE FUROATE 1 MG/G
OINTMENT TOPICAL DAILY
Qty: 45 G | Refills: 1 | Status: SHIPPED | OUTPATIENT
Start: 2024-06-26

## 2024-06-26 NOTE — PROGRESS NOTES
"    General Cardiology Clinic Note  Reason for Visit: Pre-op  Last Clinic Visit: 6/7/2023 with Dr. Peter Hodges    HPI:   Annika Mac is a 73 y.o. female who presents for pre-op clearance.     Problems:  Hypertension  Hyperlipidemia  DM Type 2  CKD Stage 3  CVA in 2003  Hx of remote DVT (provoked)  RUFINA on a CPAP   Chronic back pain   Chronic anemia    HPI:  Patient presents for clearance for lumbar RFA. She reports CP in right upper chest x 1.5 weeks. She states it is a "tightness" that comes and goes, and typically occurs with exertion or if she "over does it". Lasts minutes before resolving. It is non-radiating. She also reports chronic MCFADDEN that has been gradually worsening and feels her heart races with exertion. She had to take multiple breaks when walking to clinic from parking garage to catch her breath. She also has significant leg and back pain with exertion. She ambulates with a rolling walker and is significantly debilitated; she is not able to achieve >4 METS. She reports sleeping with head elevated for years. She has chronic mild pedal edema that fluctuates. She denies lightheadedness and syncope and sustained palpitations. She checks her BP at home daily and states it is controlled. This morning is was 124/60.     ROS:      Review of Systems   Constitutional: Negative for diaphoresis, malaise/fatigue, weight gain and weight loss.   HENT:  Negative for nosebleeds.    Eyes:  Negative for vision loss in left eye, vision loss in right eye and visual disturbance.   Cardiovascular:  Positive for chest pain, dyspnea on exertion, leg swelling and orthopnea. Negative for claudication, irregular heartbeat, near-syncope, palpitations, paroxysmal nocturnal dyspnea and syncope.   Respiratory:  Positive for shortness of breath and snoring. Negative for cough, sleep disturbances due to breathing and wheezing.    Hematologic/Lymphatic: Negative for bleeding problem. Does not bruise/bleed easily.   Skin:  Negative for " poor wound healing and rash.   Musculoskeletal:  Positive for back pain. Negative for muscle cramps and myalgias.   Gastrointestinal:  Negative for bloating, abdominal pain, diarrhea, heartburn, melena, nausea and vomiting.   Genitourinary:  Negative for hematuria and nocturia.   Neurological:  Negative for brief paralysis, dizziness, headaches, light-headedness, numbness and weakness.   Psychiatric/Behavioral:  Negative for depression.    Allergic/Immunologic: Negative for hives.       PMH:     Past Medical History:   Diagnosis Date    Asthma     Chronic obstructive pulmonary disease, unspecified COPD type 2022    Constipation 10/3/2019    Deep vein thrombophlebitis of left leg 2012    Deep vein thrombosis     when she had a knee replacement    Diabetic foot ulcer with osteomyelitis     Encounter for blood transfusion     anemic     GERD (gastroesophageal reflux disease)     Obesity, diabetes, and hypertension syndrome 2019    Obstructive sleep apnea 2012    PAD (peripheral artery disease) 2013    Pressure ulcer of toe of left foot     Type 2 diabetes mellitus with obesity 2020    Type 2 diabetes mellitus with peripheral artery disease 2020     Past Surgical History:   Procedure Laterality Date    CATARACT EXTRACTION W/  INTRAOCULAR LENS IMPLANT Left 3/2002    left     CATARACT EXTRACTION W/  INTRAOCULAR LENS IMPLANT Right     OD dr. olivares     SECTION      COLONOSCOPY N/A 2018    Procedure: COLONOSCOPY;  Surgeon: Faizan Mac MD;  Location: [address]);  Service: Endoscopy;  Laterality: N/A;    COLONOSCOPY N/A 2023    Procedure: COLONOSCOPY;  Surgeon: Pa Zamora MD;  Location: [address]);  Service: Endoscopy;  Laterality: N/A;    CYST REMOVAL  2011    left side of face    CYSTOSCOPY W/ RETROGRADES Left 2020    Procedure: CYSTOSCOPY, WITH RETROGRADE PYELOGRAM;  Surgeon: Noman Rivas MD;  Location: [facility]  1ST FLR;  Service: Urology;  Laterality: Left;  30min    DE QUERVAIN'S RELEASE  1/8/2021    Procedure: RELEASE, HAND, FOR DEQUERVAIN'S TENOSYNOVITIS;  Surgeon: Marky Hagen MD;  Location: [facility];  Service: Orthopedics;;    ESOPHAGOGASTRODUODENOSCOPY N/A 4/26/2023    Procedure: EGD (ESOPHAGOGASTRODUODENOSCOPY);  Surgeon: Pa Zamora MD;  Location: [facility] ENDO (2ND FLR);  Service: Endoscopy;  Laterality: N/A;  suprep-inst mail-bmi 49.09-tb  4/20/23- No answer for precall- [place]    EYE SURGERY Bilateral 2012    eye implants    GANGLION CYST EXCISION  11/13/2007    Right wrist    INJECTION OF ANESTHETIC AGENT AROUND MEDIAL BRANCH NERVES INNERVATING LUMBAR FACET JOINT Bilateral 4/5/2022    Procedure: Block-nerve-medial branch-lumbar bilateral L4-5 and L5-S1;  Surgeon: Alireza Meraz Jr., MD;  Location: [facility];  Service: Pain Management;  Laterality: Bilateral;  pt is diabetic   asa    INJECTION OF ANESTHETIC AGENT AROUND MEDIAL BRANCH NERVES INNERVATING LUMBAR FACET JOINT Bilateral 4/19/2022    Procedure: Block-nerve-medial branch-lumbar bilaterl L4-5 and L5-S1;  Surgeon: Alireza Meraz Jr., MD;  Location: [facility];  Service: Pain Management;  Laterality: Bilateral;  pt is diabetic     INJECTION OF FACET JOINT Bilateral 5/6/2021    Procedure: INJECTION, FACET JOINT--Bilateral L4-5, L5-S1;  Surgeon: Alireza Meraz Jr., MD;  Location: [facility];  Service: Pain Management;  Laterality: Bilateral;  Oral    INTERPOSITION ARTHROPLASTY OF CARPOMETACARPAL JOINTS Left 1/8/2021    Procedure: INTERPOSITION ARTHROPLASTY, CMC JOINT - left dequervains and cmc arthroplasty, arthrex;  Surgeon: Marky Hagen MD;  Location: [facility];  Service: Orthopedics;  Laterality: Left;    JOINT REPLACEMENT Left     Pan Retinal Photocoagulation Bilateral     Dr. Monterroso (Proliferative Diabetic Retinopathy)    RADIOFREQUENCY ABLATION OF LUMBAR MEDIAL BRANCH NERVE AT SINGLE LEVEL Bilateral 12/20/2023    Procedure:  "Radiofrequency Ablation, Nerve, Spinal, Lumbar, bilateral L4/5 L5/S1;  Surgeon: Alejandrina Roa DO;  Location: [facility] PAIN MANAGEMENT;  Service: Pain Management;  Laterality: Bilateral;  diabetic  asa    RADIOFREQUENCY THERMOCOAGULATION Right 5/12/2022    Procedure: RADIOFREQUENCY THERMAL COAGULATION RIGHT L4-5, L5-S1 (IV Sedation);  Surgeon: Alireza Meraz Jr., MD;  Location: [facility] PAIN MGT;  Service: Pain Management;  Laterality: Right;  diabetic    RADIOFREQUENCY THERMOCOAGULATION Left 5/19/2022    Procedure: RADIOFREQUENCY THERMAL COAGULATION LEFT L4-5, L5-S1 (IV Sedation);  Surgeon: Alireza Meraz Jr., MD;  Location: [facility] PAIN MGT;  Service: Pain Management;  Laterality: Left;  diabetic    TOTAL ABDOMINAL HYSTERECTOMY  1982    TOTAL KNEE ARTHROPLASTY  2/2006    left    TRIGGER FINGER RELEASE  9/18/2009     Allergies:     Review of patient's allergies indicates:   Allergen Reactions    Clindamycin Hives and Swelling    Iodinated contrast media Rash     Medications:     Current Outpatient Medications on File Prior to Visit   Medication Sig Dispense Refill    albuterol (PROAIR HFA) 90 mcg/actuation inhaler Inhale 2 puffs into the lungs every 6 (six) hours as needed for Wheezing. Rescue 18 g 6    allopurinoL (ZYLOPRIM) 300 MG tablet       amLODIPine (NORVASC) 10 MG tablet TAKE 1 TABLET BY MOUTH ONCE  DAILY 90 tablet 3    aspirin 81 MG Chew Take 1 tablet (81 mg total) by mouth once daily.  0    atorvastatin (LIPITOR) 80 MG tablet Take 1 tablet (80 mg total) by mouth every evening. 30 tablet 11    BD ULTRA-FINE KIKA PEN NEEDLE 32 gauge x 5/32" Ndle USE 4 TIMES DAILY 200 each 20    blood sugar diagnostic Strp 1 strip 4 times daily. Contour Next. 200 strip 11    blood-glucose meter kit 1 each by Other route once daily. Use daily. Insurance proffered one touch  E11.42 1 each 0    blood-glucose sensor (DEXCOM G7 SENSOR MISC) by Misc.(Non-Drug; Combo Route) route.      chlorthalidone (HYGROTEN) 25 MG Tab Take 1 " tablet (25 mg total) by mouth once daily. 90 tablet 3    CONSTULOSE 10 gram/15 mL solution SMARTSI Milliliter(s) By Mouth Twice Daily PRN      DEXCOM G7  Misc Use as directed 1 each 0    DEXCOM G7 SENSOR Terese 1 Device by Misc.(Non-Drug; Combo Route) route every 10 days. 3 each 11    dulaglutide (TRULICITY) 4.5 mg/0.5 mL pen injector INJECT THE CONTENTS OF ONE PEN  SUBCUTANEOUSLY WEEKLY AS  DIRECTED 12 pen 3    DULoxetine (CYMBALTA) 30 MG capsule Take 2 capsules (60 mg total) by mouth once daily. 180 capsule 3    glucagon (GVOKE HYPOPEN 1-PACK) 0.5 mg/0.1 mL AtIn Inject 1 Dose into the skin daily as needed (for severe hypoglycemia if you aren't able to treat by ingesting sugar). 1 Syringe 3    insulin lispro (HUMALOG KWIKPEN INSULIN) 100 unit/mL pen INJECT 13 UNITS INTO THE  SKIN 3 TIMES DAILY BEFORE  MEALS PLUS SLIDING SCALE -  MAX TOTAL DAILY DOSE 70  UNITS 30 mL 6    irbesartan (AVAPRO) 300 MG tablet Take 1 tablet (300 mg total) by mouth every evening. 90 tablet 3    JARDIANCE 10 mg tablet TAKE 1 TABLET BY MOUTH ONCE  DAILY 90 tablet 3    ketoconazole (NIZORAL) 2 % cream Apply topically once daily. 60 g 2    ketoconazole (NIZORAL) 2 % shampoo Wash hair with medicated shampoo at least 2x/week - let sit on scalp at least 5 minutes prior to rinsing 120 mL 5    labetaloL (NORMODYNE) 300 MG tablet Take 1 tablet (300 mg total) by mouth 2 (two) times daily. 180 tablet 3    lancets 31 gauge Misc 1 lancet by Misc.(Non-Drug; Combo Route) route 4 (four) times daily. E11.42 . Prescribed one touch 200 each 6    multivitamin (THERAGRAN) per tablet Take 1 tablet by mouth once daily.      pregabalin (LYRICA) 150 MG capsule Take 1 capsule (150 mg total) by mouth 2 (two) times daily. 180 capsule 1    sodium bicarbonate 325 MG tablet Take 2 tablets (650 mg total) by mouth 2 (two) times daily. 120 tablet 11    TRESIBA FLEXTOUCH U-100 100 unit/mL (3 mL) insulin pen Inject 45 Units into the skin every evening. 45 mL 3     [DISCONTINUED] insulin glargine, TOUJEO, (TOUJEO SOLOSTAR) 300 unit/mL (1.5 mL) InPn pen INJECT 40 UNITS INTO THE SKIN EVERY EVENING TITRATE UP AS DIRECTED. 4.5 mL 0     Current Facility-Administered Medications on File Prior to Visit   Medication Dose Route Frequency Provider Last Rate Last Admin    triamcinolone acetonide injection 10 mg  10 mg Intradermal Once Susie Curran MD        triamcinolone acetonide injection 10 mg  10 mg Intradermal Once Susie Curran MD         Social History:     Social History     Tobacco Use    Smoking status: Never    Smokeless tobacco: Never   Substance Use Topics    Alcohol use: No     Family History:     Family History   Problem Relation Name Age of Onset    Diabetes Mother      Hypertension Mother      Heart disease Father      Diabetes Sister X4     No Known Problems Sister unknown hx     No Known Problems Brother X2-unknown hx     Thyroid disease Brother X4     Diabetes Brother X4     Hypertension Brother X4     No Known Problems Daughter      No Known Problems Daughter      No Known Problems Son      Amblyopia Neg Hx      Blindness Neg Hx      Glaucoma Neg Hx      Macular degeneration Neg Hx      Retinal detachment Neg Hx      Strabismus Neg Hx      Colon cancer Neg Hx      Esophageal cancer Neg Hx       Physical Exam:   There were no vitals taken for this visit.       Physical Exam  Vitals and nursing note reviewed.   Constitutional:       General: She is not in acute distress.     Appearance: Normal appearance. She is obese. She is not ill-appearing.   HENT:      Head: Normocephalic and atraumatic.   Eyes:      General: No scleral icterus.     Conjunctiva/sclera: Conjunctivae normal.   Neck:      Thyroid: No thyromegaly.      Vascular: No carotid bruit or JVD.   Cardiovascular:      Rate and Rhythm: Normal rate and regular rhythm.      Pulses: Normal pulses.      Heart sounds: Murmur heard.      Systolic murmur is present with a grade of 1/6.      No friction  "rub. No gallop.   Pulmonary:      Effort: Pulmonary effort is normal.      Breath sounds: Normal breath sounds. No wheezing, rhonchi or rales.   Chest:      Chest wall: No tenderness.   Abdominal:      General: Bowel sounds are normal. There is no distension.      Palpations: Abdomen is soft.      Tenderness: There is no abdominal tenderness.   Musculoskeletal:         General: No swelling.      Cervical back: Neck supple.      Right lower leg: Pitting Edema (trace) present.      Left lower leg: Pitting Edema (trace) present.   Skin:     General: Skin is warm and dry.      Coloration: Skin is not pale.      Findings: No erythema or rash.      Nails: There is no clubbing.   Neurological:      Mental Status: She is alert and oriented to person, place, and time. Mental status is at baseline.   Psychiatric:         Mood and Affect: Mood and affect normal.         Behavior: Behavior normal.          Labs:     Lab Results   Component Value Date     05/22/2024    K 4.8 05/22/2024     05/22/2024    CO2 24 05/22/2024    BUN 32 (H) 05/22/2024    CREATININE 1.8 (H) 05/22/2024    ANIONGAP 8 05/22/2024     Lab Results   Component Value Date    HGBA1C 7.0 (H) 03/05/2024     No results found for: "BNP", "BNPTRIAGEBLO" Lab Results   Component Value Date    WBC 6.24 06/20/2024    HGB 11.2 (L) 06/20/2024    HCT 37.3 06/20/2024     06/20/2024    GRAN 3.9 06/20/2024    GRAN 62.5 06/20/2024     Lab Results   Component Value Date    CHOL 183 03/05/2024    HDL 43 03/05/2024    LDLCALC 111.2 03/05/2024    TRIG 144 03/05/2024          Imaging:   Echocardiograms:   TTE 5/27/2015    1 - Trivial aortic regurgitation.     2 - Normal left ventricular systolic function (EF 60-65%).     3 - Left ventricular diastolic dysfunction.     4 - Normal right ventricular systolic function .     Stress Tests:   Stress echo 3/14/2022  The left ventricle is normal in size with concentric remodeling and normal systolic function. The " estimated ejection fraction is 60%.  Normal right ventricular size with normal right ventricular systolic function.  Grade I left ventricular diastolic dysfunction.  The estimated PA systolic pressure is 39 mmHg.  Normal central venous pressure (3 mmHg).  The ECG portion of this study is negative for myocardial ischemia.  The stress echo portion of this study is negative for myocardial ischemia.  Overall, this study is negative for myocardial ischemia.    Caths:   None    Other:  Renal artery ultrasound 3/21/2022  Technically difficult study. The study was limited due to excessive bowel gas.  There is insignificant stenosis (0-59%) in the Right Renal Artery.  Elevated right renal resistive index suggestive of intrinsic kidney disease.  Right kidney 10.30 cm.  There is insignificant stenosis (0-59%) in the Left Renal Artery.  Elevated left renal resistive index suggestive of intrinsic kidney disease.  Left kidney 10.30 cm.    ABIs 5/4/2017  The right ankle brachial index was 1.25 which is normal.   The left ankle brachial index was 1.22 which is normal.   Waveform analysis are compatible with the above findings.     EKG 3/14/2022: normal sinus rhythm, no blocks or conduction defects, no ischemic changes    Assessment:     No diagnosis found.    Plan:     Pre-op lumbar RFA  Pt c/o exertional chest pain x 1.5 weeks and worsening, chronic MCFADDEN. She is unable to achieve > 4 METS due to SOB and back pain and has multiple risk factors for CVD. Will obtain stress echocardiogram prior to clearing for surgery.     Hypertension  Controlled at home  Continue Amlodipine 10 mg daily   Continue Chlorthalidone 25 mg daily   Continue Irbesartan 300 mg daily   Continue Labetalol 300 mg bid     Dyslipidemia  Ectatic aorta  LDL above goal. Due for recheck.   Continue Lipitor 40 mg daily for now    Diastolic dysfunction  No signs of ADHF  Recommend blood pressure control, weight loss, and low sodium diet    History of  CVA  Stable  Continue ASA and statin    CKD Stage 3  Stable on recent labs  Baseline Cr ~1.7    Type 2 DM  Uncontrolled. On Jardiance   Following with Endocrine     Morbid obesity   Following a heart health diet such as the Mediterranean Diet is recommended in addition to 150 minutes a week of moderate intensity exercise to lower cholesterol, maintain a healthy body weight, and improve overall cardiovascular health.    RUFINA  Continue CPAP       Follow up in 6 months or sooner if stress test is abnormal.     Signed:  Ellen Angulo PA-C  Cardiology   [phone] - General

## 2024-06-26 NOTE — PROGRESS NOTES
Subjective:    Patient ID:  Annika Mac is a 73 y.o. female who presents for follow-up of Hypertension and Chest Pain      HPI    The patient is a 73 year old. female with a obesity, CVA in 2003, chronic low back pain, RUFINA on CPAP intermittently, diabetes mellitus, hypertension, hyperlipidemia, and remote history of DVT after left knee replacement. She reports sharp precordial chest pain with exertion and lying back and focal tenderness over the past 2 weeks. She was referred by Dr Cotton to evaluate PAD noted on a test done at [facility] that demonstrated a  50-75%stnosis of left dorasalis .      Summary 12/6/23      Left Ventricle: The left ventricle is normal in size. Normal wall thickness. There is concentric remodeling. Normal wall motion. There is normal systolic function with a visually estimated ejection fraction of 60 - 65%. Grade II diastolic dysfunction.    Right Ventricle: Normal right ventricular cavity size. Wall thickness is normal. Right ventricle wall motion  is normal. Systolic function is normal.    Left Atrium: Left atrium is mildly dilated.    Aortic Valve: There is mild annular calcification present.    Mitral Valve: There is mild mitral annular calcification present.    Pulmonary Artery: The estimated pulmonary artery systolic pressure is 35 mmHg.    IVC/SVC: Normal venous pressure at 3 mmHg.    Stress Protocol: The patient reported no symptoms during the stress test. The test was stopped because the end of the protocol was reached.    Baseline ECG: The Baseline ECG reveals sinus rhythm. The axis is normal. The ST segments are normal.    Stress ECG: There are no ST segment deviation identified during the protocol. There are no arrhythmias during stress. There is normal blood pressure response with stress.    ECG Conclusion: The ECG portion of the study is negative for ischemia.    Post-stress Impression: The study is negative with no echocardiographic evidence of stress induced ischemia.     Overall, this study is negative for myocardial ischemia.     Lab Results   Component Value Date     05/22/2024    K 4.8 05/22/2024     05/22/2024    CO2 24 05/22/2024    BUN 32 (H) 05/22/2024    CREATININE 1.8 (H) 05/22/2024     (H) 05/22/2024    HGBA1C 7.0 (H) 03/05/2024    MG 1.7 05/26/2019    AST 16 05/22/2024    ALT 15 05/22/2024    ALBUMIN 3.5 05/22/2024    PROT 6.3 05/22/2024    BILITOT 0.3 05/22/2024    WBC 6.24 06/20/2024    HGB 11.2 (L) 06/20/2024    HCT 37.3 06/20/2024    MCV 91 06/20/2024     06/20/2024    INR 1.0 03/10/2006    TSH 1.365 09/08/2020         Lab Results   Component Value Date    CHOL 183 03/05/2024    HDL 43 03/05/2024    TRIG 144 03/05/2024       Lab Results   Component Value Date    LDLCALC 111.2 03/05/2024       Past Medical History:   Diagnosis Date    Asthma     Chronic obstructive pulmonary disease, unspecified COPD type 1/14/2022    Constipation 10/3/2019    Deep vein thrombophlebitis of left leg 7/27/2012    Deep vein thrombosis     when she had a knee replacement    Diabetic foot ulcer with osteomyelitis     Encounter for blood transfusion     anemic     GERD (gastroesophageal reflux disease)     Obesity, diabetes, and hypertension syndrome 2/13/2019    Obstructive sleep apnea 7/27/2012    PAD (peripheral artery disease) 11/21/2013    Pressure ulcer of toe of left foot     Type 2 diabetes mellitus with obesity 8/19/2020    Type 2 diabetes mellitus with peripheral artery disease 8/19/2020       Current Outpatient Medications:     albuterol (PROAIR HFA) 90 mcg/actuation inhaler, Inhale 2 puffs into the lungs every 6 (six) hours as needed for Wheezing. Rescue, Disp: 18 g, Rfl: 6    allopurinoL (ZYLOPRIM) 300 MG tablet, , Disp: , Rfl:     amLODIPine (NORVASC) 10 MG tablet, TAKE 1 TABLET BY MOUTH ONCE  DAILY, Disp: 90 tablet, Rfl: 3    aspirin 81 MG Chew, Take 1 tablet (81 mg total) by mouth once daily., Disp: , Rfl: 0    atorvastatin (LIPITOR) 80 MG tablet,  "Take 1 tablet (80 mg total) by mouth every evening., Disp: 30 tablet, Rfl: 11    BD ULTRA-FINE KIKA PEN NEEDLE 32 gauge x 5/32" Ndle, USE 4 TIMES DAILY, Disp: 200 each, Rfl: 20    blood sugar diagnostic Strp, 1 strip 4 times daily. Contour Next., Disp: 200 strip, Rfl: 11    blood-glucose meter kit, 1 each by Other route once daily. Use daily. Insurance proffered one touch  E11.42, Disp: 1 each, Rfl: 0    blood-glucose sensor (DEXCOM G7 SENSOR MISC), by Misc.(Non-Drug; Combo Route) route., Disp: , Rfl:     chlorthalidone (HYGROTEN) 25 MG Tab, Take 1 tablet (25 mg total) by mouth once daily., Disp: 90 tablet, Rfl: 3    CONSTULOSE 10 gram/15 mL solution, SMARTSI Milliliter(s) By Mouth Twice Daily PRN, Disp: , Rfl:     DEXCOM G7  Misc, Use as directed, Disp: 1 each, Rfl: 0    DEXCOM G7 SENSOR Terese, 1 Device by Misc.(Non-Drug; Combo Route) route every 10 days., Disp: 3 each, Rfl: 11    dulaglutide (TRULICITY) 4.5 mg/0.5 mL pen injector, INJECT THE CONTENTS OF ONE PEN  SUBCUTANEOUSLY WEEKLY AS  DIRECTED, Disp: 12 pen , Rfl: 3    DULoxetine (CYMBALTA) 30 MG capsule, Take 2 capsules (60 mg total) by mouth once daily., Disp: 180 capsule, Rfl: 3    glucagon (GVOKE HYPOPEN 1-PACK) 0.5 mg/0.1 mL AtIn, Inject 1 Dose into the skin daily as needed (for severe hypoglycemia if you aren't able to treat by ingesting sugar)., Disp: 1 Syringe, Rfl: 3    insulin lispro (HUMALOG KWIKPEN INSULIN) 100 unit/mL pen, INJECT 13 UNITS INTO THE  SKIN 3 TIMES DAILY BEFORE  MEALS PLUS SLIDING SCALE -  MAX TOTAL DAILY DOSE 70  UNITS, Disp: 30 mL, Rfl: 6    irbesartan (AVAPRO) 300 MG tablet, Take 1 tablet (300 mg total) by mouth every evening., Disp: 90 tablet, Rfl: 3    JARDIANCE 10 mg tablet, TAKE 1 TABLET BY MOUTH ONCE  DAILY, Disp: 90 tablet, Rfl: 3    ketoconazole (NIZORAL) 2 % cream, Apply topically once daily., Disp: 60 g, Rfl: 2    ketoconazole (NIZORAL) 2 % shampoo, Wash hair with medicated shampoo at least 2x/week - let sit on " scalp at least 5 minutes prior to rinsing, Disp: 120 mL, Rfl: 5    labetaloL (NORMODYNE) 300 MG tablet, Take 1 tablet (300 mg total) by mouth 2 (two) times daily., Disp: 180 tablet, Rfl: 3    lancets 31 gauge Misc, 1 lancet by Misc.(Non-Drug; Combo Route) route 4 (four) times daily. E11.42 . Prescribed one touch, Disp: 200 each, Rfl: 6    multivitamin (THERAGRAN) per tablet, Take 1 tablet by mouth once daily., Disp: , Rfl:     pregabalin (LYRICA) 150 MG capsule, Take 1 capsule (150 mg total) by mouth 2 (two) times daily., Disp: 180 capsule, Rfl: 1    TRESIBA FLEXTOUCH U-100 100 unit/mL (3 mL) insulin pen, Inject 45 Units into the skin every evening., Disp: 45 mL, Rfl: 3    sodium bicarbonate 325 MG tablet, Take 2 tablets (650 mg total) by mouth 2 (two) times daily., Disp: 120 tablet, Rfl: 11    Current Facility-Administered Medications:     triamcinolone acetonide injection 10 mg, 10 mg, Intradermal, Once, Susie Curran MD    triamcinolone acetonide injection 10 mg, 10 mg, Intradermal, Once, Susie Curran MD          Review of Systems   Constitutional: Positive for malaise/fatigue. Negative for decreased appetite, diaphoresis, fever, weight gain and weight loss.   HENT:  Negative for congestion, ear discharge, ear pain and nosebleeds.    Eyes:  Negative for blurred vision, double vision and visual disturbance.   Cardiovascular:  Positive for chest pain and dyspnea on exertion. Negative for claudication, cyanosis, irregular heartbeat, leg swelling, near-syncope, orthopnea, palpitations, paroxysmal nocturnal dyspnea and syncope.   Respiratory:  Positive for shortness of breath. Negative for cough, hemoptysis, sleep disturbances due to breathing, snoring, sputum production and wheezing.    Endocrine: Negative for polydipsia, polyphagia and polyuria.   Hematologic/Lymphatic: Negative for adenopathy and bleeding problem. Does not bruise/bleed easily.   Skin:  Negative for color change, nail changes, poor  "wound healing and rash.   Musculoskeletal:  Negative for muscle cramps and muscle weakness.   Gastrointestinal:  Negative for abdominal pain, anorexia, change in bowel habit, hematochezia, nausea and vomiting.   Genitourinary:  Negative for dysuria, frequency and hematuria.   Neurological:  Negative for brief paralysis, difficulty with concentration, excessive daytime sleepiness, dizziness, focal weakness, headaches, light-headedness, seizures, vertigo and weakness.   Psychiatric/Behavioral:  Negative for altered mental status and depression.    Allergic/Immunologic: Negative for persistent infections.        Objective:BP (!) 140/60   Pulse 84   Ht 5' 5" (1.651 m)   Wt (!) 136.4 kg (300 lb 11.3 oz)   SpO2 98%   BMI 50.04 kg/m²             Physical Exam  Constitutional:       Appearance: She is well-developed. She is obese.   HENT:      Head: Normocephalic.      Right Ear: External ear normal.      Left Ear: External ear normal.      Nose: Nose normal.   Eyes:      General: No scleral icterus.     Conjunctiva/sclera: Conjunctivae normal.      Pupils: Pupils are equal, round, and reactive to light.   Neck:      Thyroid: No thyromegaly.      Vascular: No JVD.      Trachea: No tracheal deviation.   Cardiovascular:      Rate and Rhythm: Normal rate and regular rhythm.      Pulses: Intact distal pulses.           Carotid pulses are 2+ on the left side.       Dorsalis pedis pulses are 2+ on the right side and 2+ on the left side.        Posterior tibial pulses are 0 on the right side and 0 on the left side.      Heart sounds: Normal heart sounds. No murmur heard.     No friction rub. No gallop.      Comments: +1 dependent edema  Pulmonary:      Effort: Pulmonary effort is normal. No respiratory distress.      Breath sounds: Normal breath sounds. No wheezing or rales.   Chest:      Chest wall: No tenderness.          Comments: Tenderness at site of reported chest pain  Abdominal:      General: Bowel sounds are normal. " There is no distension.      Palpations: Abdomen is soft.      Tenderness: There is no abdominal tenderness. There is no guarding.   Musculoskeletal:         General: No tenderness. Normal range of motion.      Cervical back: Normal range of motion.   Lymphadenopathy:      Comments: Palpation of lymph nodes of neck and groin normal   Skin:     General: Skin is warm and dry.      Coloration: Skin is not pale.      Findings: No erythema or rash.      Comments: Palpation of skin normal   Neurological:      Mental Status: She is alert and oriented to person, place, and time.      Cranial Nerves: No cranial nerve deficit.      Motor: No abnormal muscle tone.      Coordination: Coordination normal.   Psychiatric:         Behavior: Behavior normal.         Thought Content: Thought content normal.         Judgment: Judgment normal.           Assessment:       1. Non-cardiac chest pain    2. PAD (peripheral artery disease)    3. Hemiplegia and hemiparesis following cerebral infarction affecting right dominant side    4. Chronic obstructive pulmonary disease, unspecified COPD type    5. Diastolic dysfunction    6. Dyslipidemia associated with type 2 diabetes mellitus    7. Ectatic aorta    8. Essential hypertension    9. Hypertensive heart and chronic kidney disease with heart failure and stage 1 through stage 4 chronic kidney disease, or unspecified chronic kidney disease    10. Stage 3b chronic kidney disease    11. Class 3 severe obesity due to excess calories with serious comorbidity and body mass index (BMI) of 50.0 to 59.9 in adult    12. Morbid obesity    13. Type 2 diabetes mellitus with both eyes affected by proliferative retinopathy and macular edema, with long-term current use of insulin    14. Costochondritis         Plan:       Annika was seen today for hypertension and chest pain.    Diagnoses and all orders for this visit:    Non-cardiac chest pain    PAD (peripheral artery disease)  -     Ambulatory  referral/consult to Cardiology    Hemiplegia and hemiparesis following cerebral infarction affecting right dominant side    Chronic obstructive pulmonary disease, unspecified COPD type    Diastolic dysfunction    Dyslipidemia associated with type 2 diabetes mellitus    Ectatic aorta    Essential hypertension    Hypertensive heart and chronic kidney disease with heart failure and stage 1 through stage 4 chronic kidney disease, or unspecified chronic kidney disease    Stage 3b chronic kidney disease    Class 3 severe obesity due to excess calories with serious comorbidity and body mass index (BMI) of 50.0 to 59.9 in adult    Morbid obesity    Type 2 diabetes mellitus with both eyes affected by proliferative retinopathy and macular edema, with long-term current use of insulin    Costochondritis

## 2024-06-26 NOTE — PROGRESS NOTES
Subjective:      Patient ID:  Annika Mac is a 73 y.o. female who presents for   Chief Complaint   Patient presents with    Rash     R wrist    Lesion     L calf     73 y.o. female presents today for discoloration and lesion.    Last office visit 4/18/23 with Dr. Curran for hair loss.     Location: R wrist  Duration: >1 yr  Symptoms: itchy at first, light discoloration  Current treatments: ketoconazole cream  Prior treatments tried:  Other pertinent history:    Patient with new are of concern:   Location: lesion on L calf  Duration: 3 mos  Symptoms: getting bigger  Previous treatments: none          Review of Systems    Objective:   Physical Exam   Skin:   Areas Examined (abnormalities noted in diagram):   RUE Inspected  LLE Inspection Performed                Diagram Legend     Erythematous scaling macule/papule c/w actinic keratosis       Vascular papule c/w angioma      Pigmented verrucoid papule/plaque c/w seborrheic keratosis      Yellow umbilicated papule c/w sebaceous hyperplasia      Irregularly shaped tan macule c/w lentigo     1-2 mm smooth white papules consistent with Milia      Movable subcutaneous cyst with punctum c/w epidermal inclusion cyst      Subcutaneous movable cyst c/w pilar cyst      Firm pink to brown papule c/w dermatofibroma      Pedunculated fleshy papule(s) c/w skin tag(s)      Evenly pigmented macule c/w junctional nevus     Mildly variegated pigmented, slightly irregular-bordered macule c/w mildly atypical nevus      Flesh colored to evenly pigmented papule c/w intradermal nevus       Pink pearly papule/plaque c/w basal cell carcinoma      Erythematous hyperkeratotic cursted plaque c/w SCC      Surgical scar with no sign of skin cancer recurrence      Open and closed comedones      Inflammatory papules and pustules      Verrucoid papule consistent consistent with wart     Erythematous eczematous patches and plaques     Dystrophic onycholytic nail with subungual debris c/w  onychomycosis     Umbilicated papule    Erythematous-base heme-crusted tan verrucoid plaque consistent with inflamed seborrheic keratosis     Erythematous Silvery Scaling Plaque c/w Psoriasis     See annotation      Assessment / Plan:        Seborrheic keratoses  These are benign inherited growths without a malignant potential. Reassurance given to patient. No treatment is necessary.     Vitiligo  Status: chronic condition flaring and/or not at treatment goal (>1 yr)  Depigmented patch R dorsal wrist- favor vitiligo  Opzelura and protopic not preferred on formulary- will start with trial of topical steroids  Start mometasone ointment bid x 1 week on, 1 week off. Reviewed potential SE thinning/discoloration of skin to discuss escalation of treatments  Reviewed autoimmune nature of condition. Contact office for spread    Pt was seeing Dr. Curran for hair loss and wants to schedule follow up- will schedule follow up for hair loss visit and can check on vitiligo

## 2024-07-01 ENCOUNTER — NURSE TRIAGE (OUTPATIENT)
Dept: ADMINISTRATIVE | Facility: CLINIC | Age: 74
End: 2024-07-01
Payer: MEDICARE

## 2024-07-01 ENCOUNTER — HOSPITAL ENCOUNTER (INPATIENT)
Facility: HOSPITAL | Age: 74
LOS: 2 days | Discharge: HOME | DRG: 372 | End: 2024-07-04
Attending: STUDENT IN AN ORGANIZED HEALTH CARE EDUCATION/TRAINING PROGRAM | Admitting: INTERNAL MEDICINE
Payer: MEDICARE

## 2024-07-01 DIAGNOSIS — K52.9 COLITIS: Primary | ICD-10-CM

## 2024-07-01 DIAGNOSIS — E86.0 DEHYDRATION: ICD-10-CM

## 2024-07-01 DIAGNOSIS — R07.9 CHEST PAIN: ICD-10-CM

## 2024-07-01 PROBLEM — N17.9 ACUTE KIDNEY INJURY SUPERIMPOSED ON CHRONIC KIDNEY DISEASE: Status: ACTIVE | Noted: 2024-07-01

## 2024-07-01 PROBLEM — N18.9 ACUTE KIDNEY INJURY SUPERIMPOSED ON CHRONIC KIDNEY DISEASE: Status: ACTIVE | Noted: 2024-07-01

## 2024-07-01 PROBLEM — R19.7 DIARRHEA: Status: ACTIVE | Noted: 2024-07-01

## 2024-07-01 LAB
ALBUMIN SERPL BCP-MCNC: 3.5 G/DL (ref 3.5–5.2)
ALLENS TEST: NORMAL
ALP SERPL-CCNC: 114 U/L (ref 55–135)
ALT SERPL W/O P-5'-P-CCNC: 15 U/L (ref 10–44)
ANION GAP SERPL CALC-SCNC: 10 MMOL/L (ref 8–16)
AST SERPL-CCNC: 17 U/L (ref 10–40)
BASOPHILS # BLD AUTO: 0.05 K/UL (ref 0–0.2)
BASOPHILS NFR BLD: 0.5 % (ref 0–1.9)
BILIRUB SERPL-MCNC: 0.4 MG/DL (ref 0.1–1)
BUN SERPL-MCNC: 27 MG/DL (ref 8–23)
BUN SERPL-MCNC: 29 MG/DL (ref 6–30)
CALCIUM SERPL-MCNC: 9.8 MG/DL (ref 8.7–10.5)
CHLORIDE SERPL-SCNC: 102 MMOL/L (ref 95–110)
CHLORIDE SERPL-SCNC: 104 MMOL/L (ref 95–110)
CO2 SERPL-SCNC: 23 MMOL/L (ref 23–29)
CREAT SERPL-MCNC: 2.2 MG/DL (ref 0.5–1.4)
CREAT SERPL-MCNC: 2.3 MG/DL (ref 0.5–1.4)
DIFFERENTIAL METHOD BLD: ABNORMAL
EOSINOPHIL # BLD AUTO: 0.1 K/UL (ref 0–0.5)
EOSINOPHIL NFR BLD: 0.6 % (ref 0–8)
ERYTHROCYTE [DISTWIDTH] IN BLOOD BY AUTOMATED COUNT: 15.5 % (ref 11.5–14.5)
EST. GFR  (NO RACE VARIABLE): 23 ML/MIN/1.73 M^2
GLUCOSE SERPL-MCNC: 220 MG/DL (ref 70–110)
GLUCOSE SERPL-MCNC: 223 MG/DL (ref 70–110)
HCT VFR BLD AUTO: 38.8 % (ref 37–48.5)
HCT VFR BLD CALC: 27 %PCV (ref 36–54)
HCV AB SERPL QL IA: NORMAL
HGB BLD-MCNC: 12 G/DL (ref 12–16)
HIV 1+2 AB+HIV1 P24 AG SERPL QL IA: NORMAL
IMM GRANULOCYTES # BLD AUTO: 0.06 K/UL (ref 0–0.04)
IMM GRANULOCYTES NFR BLD AUTO: 0.6 % (ref 0–0.5)
LDH SERPL L TO P-CCNC: 1.38 MMOL/L (ref 0.5–2.2)
LIPASE SERPL-CCNC: 13 U/L (ref 4–60)
LYMPHOCYTES # BLD AUTO: 0.9 K/UL (ref 1–4.8)
LYMPHOCYTES NFR BLD: 8.5 % (ref 18–48)
MCH RBC QN AUTO: 27.5 PG (ref 27–31)
MCHC RBC AUTO-ENTMCNC: 30.9 G/DL (ref 32–36)
MCV RBC AUTO: 89 FL (ref 82–98)
MONOCYTES # BLD AUTO: 0.9 K/UL (ref 0.3–1)
MONOCYTES NFR BLD: 8.5 % (ref 4–15)
NEUTROPHILS # BLD AUTO: 8.7 K/UL (ref 1.8–7.7)
NEUTROPHILS NFR BLD: 81.3 % (ref 38–73)
NRBC BLD-RTO: 0 /100 WBC
PLATELET # BLD AUTO: 249 K/UL (ref 150–450)
PMV BLD AUTO: 10.9 FL (ref 9.2–12.9)
POC IONIZED CALCIUM: 1.27 MMOL/L (ref 1.06–1.42)
POC TCO2 (MEASURED): 24 MMOL/L (ref 23–29)
POCT GLUCOSE: 148 MG/DL (ref 70–110)
POCT GLUCOSE: 196 MG/DL (ref 70–110)
POTASSIUM BLD-SCNC: 5.1 MMOL/L (ref 3.5–5.1)
POTASSIUM SERPL-SCNC: 4.2 MMOL/L (ref 3.5–5.1)
PROT SERPL-MCNC: 6.6 G/DL (ref 6–8.4)
RBC # BLD AUTO: 4.37 M/UL (ref 4–5.4)
SAMPLE: ABNORMAL
SAMPLE: NORMAL
SARS-COV-2 RDRP RESP QL NAA+PROBE: NEGATIVE
SITE: NORMAL
SODIUM BLD-SCNC: 135 MMOL/L (ref 136–145)
SODIUM SERPL-SCNC: 137 MMOL/L (ref 136–145)
WBC # BLD AUTO: 10.69 K/UL (ref 3.9–12.7)

## 2024-07-01 PROCEDURE — 96361 HYDRATE IV INFUSION ADD-ON: CPT

## 2024-07-01 PROCEDURE — 85025 COMPLETE CBC W/AUTO DIFF WBC: CPT | Performed by: EMERGENCY MEDICINE

## 2024-07-01 PROCEDURE — 87427 SHIGA-LIKE TOXIN AG IA: CPT | Mod: 59

## 2024-07-01 PROCEDURE — 87449 NOS EACH ORGANISM AG IA: CPT | Performed by: STUDENT IN AN ORGANIZED HEALTH CARE EDUCATION/TRAINING PROGRAM

## 2024-07-01 PROCEDURE — 80047 BASIC METABLC PNL IONIZED CA: CPT

## 2024-07-01 PROCEDURE — G0378 HOSPITAL OBSERVATION PER HR: HCPCS

## 2024-07-01 PROCEDURE — 86803 HEPATITIS C AB TEST: CPT | Performed by: PHYSICIAN ASSISTANT

## 2024-07-01 PROCEDURE — 63600175 PHARM REV CODE 636 W HCPCS

## 2024-07-01 PROCEDURE — 87045 FECES CULTURE AEROBIC BACT: CPT

## 2024-07-01 PROCEDURE — 96372 THER/PROPH/DIAG INJ SC/IM: CPT

## 2024-07-01 PROCEDURE — 89055 LEUKOCYTE ASSESSMENT FECAL: CPT

## 2024-07-01 PROCEDURE — 83605 ASSAY OF LACTIC ACID: CPT

## 2024-07-01 PROCEDURE — 87449 NOS EACH ORGANISM AG IA: CPT | Mod: 91

## 2024-07-01 PROCEDURE — 87389 HIV-1 AG W/HIV-1&-2 AB AG IA: CPT | Performed by: PHYSICIAN ASSISTANT

## 2024-07-01 PROCEDURE — 87324 CLOSTRIDIUM AG IA: CPT | Performed by: STUDENT IN AN ORGANIZED HEALTH CARE EDUCATION/TRAINING PROGRAM

## 2024-07-01 PROCEDURE — 83690 ASSAY OF LIPASE: CPT | Performed by: EMERGENCY MEDICINE

## 2024-07-01 PROCEDURE — 99285 EMERGENCY DEPT VISIT HI MDM: CPT | Mod: 25

## 2024-07-01 PROCEDURE — 96375 TX/PRO/DX INJ NEW DRUG ADDON: CPT

## 2024-07-01 PROCEDURE — 80053 COMPREHEN METABOLIC PANEL: CPT | Performed by: EMERGENCY MEDICINE

## 2024-07-01 PROCEDURE — 82962 GLUCOSE BLOOD TEST: CPT

## 2024-07-01 PROCEDURE — 63600175 PHARM REV CODE 636 W HCPCS: Performed by: STUDENT IN AN ORGANIZED HEALTH CARE EDUCATION/TRAINING PROGRAM

## 2024-07-01 PROCEDURE — 99900035 HC TECH TIME PER 15 MIN (STAT)

## 2024-07-01 PROCEDURE — U0002 COVID-19 LAB TEST NON-CDC: HCPCS | Performed by: EMERGENCY MEDICINE

## 2024-07-01 PROCEDURE — 25000003 PHARM REV CODE 250: Performed by: STUDENT IN AN ORGANIZED HEALTH CARE EDUCATION/TRAINING PROGRAM

## 2024-07-01 PROCEDURE — 87046 STOOL CULTR AEROBIC BACT EA: CPT

## 2024-07-01 PROCEDURE — 25000003 PHARM REV CODE 250

## 2024-07-01 RX ORDER — LABETALOL 100 MG/1
300 TABLET, FILM COATED ORAL 2 TIMES DAILY
Status: DISCONTINUED | OUTPATIENT
Start: 2024-07-01 | End: 2024-07-04 | Stop reason: HOSPADM

## 2024-07-01 RX ORDER — ACETAMINOPHEN 325 MG/1
650 TABLET ORAL EVERY 4 HOURS PRN
Status: DISCONTINUED | OUTPATIENT
Start: 2024-07-01 | End: 2024-07-01

## 2024-07-01 RX ORDER — IPRATROPIUM BROMIDE AND ALBUTEROL SULFATE 2.5; .5 MG/3ML; MG/3ML
3 SOLUTION RESPIRATORY (INHALATION) EVERY 6 HOURS PRN
Status: DISCONTINUED | OUTPATIENT
Start: 2024-07-01 | End: 2024-07-04 | Stop reason: HOSPADM

## 2024-07-01 RX ORDER — IBUPROFEN 200 MG
24 TABLET ORAL
Status: DISCONTINUED | OUTPATIENT
Start: 2024-07-01 | End: 2024-07-04 | Stop reason: HOSPADM

## 2024-07-01 RX ORDER — TALC
6 POWDER (GRAM) TOPICAL NIGHTLY PRN
Status: DISCONTINUED | OUTPATIENT
Start: 2024-07-01 | End: 2024-07-04 | Stop reason: HOSPADM

## 2024-07-01 RX ORDER — ALUMINUM HYDROXIDE, MAGNESIUM HYDROXIDE, AND SIMETHICONE 1200; 120; 1200 MG/30ML; MG/30ML; MG/30ML
30 SUSPENSION ORAL 4 TIMES DAILY PRN
Status: DISCONTINUED | OUTPATIENT
Start: 2024-07-01 | End: 2024-07-04 | Stop reason: HOSPADM

## 2024-07-01 RX ORDER — NALOXONE HCL 0.4 MG/ML
0.02 VIAL (ML) INJECTION
Status: DISCONTINUED | OUTPATIENT
Start: 2024-07-01 | End: 2024-07-04 | Stop reason: HOSPADM

## 2024-07-01 RX ORDER — IBUPROFEN 200 MG
16 TABLET ORAL
Status: DISCONTINUED | OUTPATIENT
Start: 2024-07-01 | End: 2024-07-04 | Stop reason: HOSPADM

## 2024-07-01 RX ORDER — ONDANSETRON HYDROCHLORIDE 2 MG/ML
4 INJECTION, SOLUTION INTRAVENOUS EVERY 8 HOURS PRN
Status: DISCONTINUED | OUTPATIENT
Start: 2024-07-01 | End: 2024-07-04 | Stop reason: HOSPADM

## 2024-07-01 RX ORDER — DICYCLOMINE HYDROCHLORIDE 10 MG/1
10 CAPSULE ORAL 4 TIMES DAILY PRN
Status: DISCONTINUED | OUTPATIENT
Start: 2024-07-01 | End: 2024-07-04 | Stop reason: HOSPADM

## 2024-07-01 RX ORDER — HEPARIN SODIUM 5000 [USP'U]/ML
5000 INJECTION, SOLUTION INTRAVENOUS; SUBCUTANEOUS EVERY 8 HOURS
Status: DISCONTINUED | OUTPATIENT
Start: 2024-07-01 | End: 2024-07-04 | Stop reason: HOSPADM

## 2024-07-01 RX ORDER — INSULIN ASPART 100 [IU]/ML
0-5 INJECTION, SOLUTION INTRAVENOUS; SUBCUTANEOUS
Status: DISCONTINUED | OUTPATIENT
Start: 2024-07-01 | End: 2024-07-04 | Stop reason: HOSPADM

## 2024-07-01 RX ORDER — PREGABALIN 150 MG/1
150 CAPSULE ORAL 2 TIMES DAILY
Status: DISCONTINUED | OUTPATIENT
Start: 2024-07-01 | End: 2024-07-04 | Stop reason: HOSPADM

## 2024-07-01 RX ORDER — AMLODIPINE BESYLATE 10 MG/1
10 TABLET ORAL DAILY
Status: DISCONTINUED | OUTPATIENT
Start: 2024-07-02 | End: 2024-07-04 | Stop reason: HOSPADM

## 2024-07-01 RX ORDER — POLYETHYLENE GLYCOL 3350 17 G/17G
17 POWDER, FOR SOLUTION ORAL 2 TIMES DAILY PRN
Status: DISCONTINUED | OUTPATIENT
Start: 2024-07-01 | End: 2024-07-04 | Stop reason: HOSPADM

## 2024-07-01 RX ORDER — ACETAMINOPHEN 500 MG
1000 TABLET ORAL
Status: COMPLETED | OUTPATIENT
Start: 2024-07-01 | End: 2024-07-01

## 2024-07-01 RX ORDER — ACETAMINOPHEN 500 MG
1000 TABLET ORAL EVERY 8 HOURS PRN
Status: DISCONTINUED | OUTPATIENT
Start: 2024-07-01 | End: 2024-07-04 | Stop reason: HOSPADM

## 2024-07-01 RX ORDER — GLUCAGON 1 MG
1 KIT INJECTION
Status: DISCONTINUED | OUTPATIENT
Start: 2024-07-01 | End: 2024-07-04 | Stop reason: HOSPADM

## 2024-07-01 RX ORDER — NAPROXEN SODIUM 220 MG/1
81 TABLET, FILM COATED ORAL DAILY
Status: DISCONTINUED | OUTPATIENT
Start: 2024-07-02 | End: 2024-07-04 | Stop reason: HOSPADM

## 2024-07-01 RX ORDER — DICYCLOMINE HYDROCHLORIDE 10 MG/1
20 CAPSULE ORAL
Status: COMPLETED | OUTPATIENT
Start: 2024-07-01 | End: 2024-07-01

## 2024-07-01 RX ORDER — DULOXETIN HYDROCHLORIDE 30 MG/1
60 CAPSULE, DELAYED RELEASE ORAL DAILY
Status: DISCONTINUED | OUTPATIENT
Start: 2024-07-02 | End: 2024-07-04 | Stop reason: HOSPADM

## 2024-07-01 RX ORDER — ONDANSETRON HYDROCHLORIDE 2 MG/ML
4 INJECTION, SOLUTION INTRAVENOUS
Status: COMPLETED | OUTPATIENT
Start: 2024-07-01 | End: 2024-07-01

## 2024-07-01 RX ORDER — ACETAMINOPHEN 325 MG/1
650 TABLET ORAL EVERY 8 HOURS PRN
Status: DISCONTINUED | OUTPATIENT
Start: 2024-07-01 | End: 2024-07-01

## 2024-07-01 RX ORDER — ONDANSETRON 8 MG/1
8 TABLET, ORALLY DISINTEGRATING ORAL EVERY 8 HOURS PRN
Status: DISCONTINUED | OUTPATIENT
Start: 2024-07-01 | End: 2024-07-04 | Stop reason: HOSPADM

## 2024-07-01 RX ORDER — ATORVASTATIN CALCIUM 40 MG/1
80 TABLET, FILM COATED ORAL NIGHTLY
Status: DISCONTINUED | OUTPATIENT
Start: 2024-07-01 | End: 2024-07-04 | Stop reason: HOSPADM

## 2024-07-01 RX ORDER — SODIUM CHLORIDE 0.9 % (FLUSH) 0.9 %
10 SYRINGE (ML) INJECTION
Status: DISCONTINUED | OUTPATIENT
Start: 2024-07-01 | End: 2024-07-04 | Stop reason: HOSPADM

## 2024-07-01 RX ORDER — SIMETHICONE 80 MG
1 TABLET,CHEWABLE ORAL 4 TIMES DAILY PRN
Status: DISCONTINUED | OUTPATIENT
Start: 2024-07-01 | End: 2024-07-04 | Stop reason: HOSPADM

## 2024-07-01 RX ADMIN — SODIUM CHLORIDE, POTASSIUM CHLORIDE, SODIUM LACTATE AND CALCIUM CHLORIDE 1000 ML: 600; 310; 30; 20 INJECTION, SOLUTION INTRAVENOUS at 02:07

## 2024-07-01 RX ADMIN — LABETALOL HYDROCHLORIDE 300 MG: 100 TABLET, FILM COATED ORAL at 10:07

## 2024-07-01 RX ADMIN — ATORVASTATIN CALCIUM 80 MG: 40 TABLET, FILM COATED ORAL at 10:07

## 2024-07-01 RX ADMIN — HEPARIN SODIUM 5000 UNITS: 5000 INJECTION INTRAVENOUS; SUBCUTANEOUS at 10:07

## 2024-07-01 RX ADMIN — DICYCLOMINE HYDROCHLORIDE 20 MG: 10 CAPSULE ORAL at 03:07

## 2024-07-01 RX ADMIN — ACETAMINOPHEN 1000 MG: 500 TABLET ORAL at 01:07

## 2024-07-01 RX ADMIN — PREGABALIN 150 MG: 150 CAPSULE ORAL at 10:07

## 2024-07-01 RX ADMIN — ONDANSETRON 4 MG: 2 INJECTION INTRAMUSCULAR; INTRAVENOUS at 01:07

## 2024-07-01 NOTE — ASSESSMENT & PLAN NOTE
Chronic, controlled. Latest blood pressure and vitals reviewed-     Temp:  [98.8 °F (37.1 °C)]   Pulse:  [62-68]   Resp:  [18-22]   BP: (116-157)/(55-70)   SpO2:  [95 %-99 %] .   Home meds for hypertension were reviewed and noted below.   Hypertension Medications               amLODIPine (NORVASC) 10 MG tablet TAKE 1 TABLET BY MOUTH ONCE  DAILY    chlorthalidone (HYGROTEN) 25 MG Tab Take 1 tablet (25 mg total) by mouth once daily.    irbesartan (AVAPRO) 300 MG tablet Take 1 tablet (300 mg total) by mouth every evening.    labetaloL (NORMODYNE) 300 MG tablet Take 1 tablet (300 mg total) by mouth 2 (two) times daily.            While in the hospital, will manage blood pressure as follows; Continue home antihypertensive regimen    Will utilize p.r.n. blood pressure medication only if patient's blood pressure greater than 180/110 and she develops symptoms such as worsening chest pain or shortness of breath.

## 2024-07-01 NOTE — ASSESSMENT & PLAN NOTE
"Patient's FSGs are controlled on current medication regimen.  Last A1c reviewed-   Lab Results   Component Value Date    HGBA1C 7.0 (H) 03/05/2024     Most recent fingerstick glucose reviewed- No results for input(s): "POCTGLUCOSE" in the last 24 hours.  Current correctional scale  Low  Maintain anti-hyperglycemic dose as follows-   Antihyperglycemics (From admission, onward)      Start     Stop Route Frequency Ordered    07/01/24 1629  insulin aspart U-100 pen 0-5 Units         -- SubQ Before meals & nightly PRN 07/01/24 1529          Hold Oral hypoglycemics while patient is in the hospital.  "

## 2024-07-01 NOTE — ASSESSMENT & PLAN NOTE
Diarrhea  - CT a/p subtle stranding about ascending colon near the hepatic flexure as above, could represent underlying colitis   - stool studies pending   - IVF as tolerated   - hold imodium until stool studies performed

## 2024-07-01 NOTE — TELEPHONE ENCOUNTER
Pt has been having diarrhea since Thursday. Its been coming out like water. She has been having fever and chills. She does not have a thermometer but knows she is warm. About 10 stools/day. She feels bad and she thinks she is dihydrated. She can't even hold juice down it comes right out. Pt stated she feels light headed and dizzy. Severe stomach pain since the diarrhea started pain is 9/10. Care advice recommend pt go to Er. Pt verbalized understanding.    Reason for Disposition   SEVERE abdominal pain (e.g., excruciating) and present > 1 hour    Additional Information   Negative: Shock suspected (e.g., cold/pale/clammy skin, too weak to stand, low BP, rapid pulse)   Negative: Difficult to awaken or acting confused (e.g., disoriented, slurred speech)   Negative: Sounds like a life-threatening emergency to the triager    Protocols used: Diarrhea-A-OH

## 2024-07-01 NOTE — ASSESSMENT & PLAN NOTE
Patient with acute kidney injury/acute renal failure likely due to pre-renal azotemia due to IVVD GEORGE is currently stable. Baseline creatinine  1.6  - Labs reviewed- Renal function/electrolytes with Estimated Creatinine Clearance: 30.6 mL/min (A) (based on SCr of 2.2 mg/dL (H)). according to latest data. Monitor urine output and serial BMP and adjust therapy as needed. Avoid nephrotoxins and renally dose meds for GFR listed above.  - holding losartan and chlorthalidone

## 2024-07-01 NOTE — HPI
Annika Mac is a 73 y.o. female with GERD, peripheral artery disease, DM2, history of DVT, COPD presents for diarrhea and abdominal pain. Patient reports 4-5 days of right-sided abdominal cramping with associated diarrhea. She reports the diarrhea is watery with > 10 episodes of diarrhea a day. Has associated with nausea but no vomiting. Describes the abdominal pain as sharp. She does report a small spot of blood from a known hemorrhoid but denies any other blood in her stool. She denies any international travel, recent antibiotic use, recent hospitalizations, dysuria, fevers, chills.     ED: mildly hypertensive, otherwise VSS. CBC without leukocytosis, CMP with Cr 2.2 (baseline ~1.6). Covid negative. CT a/p with subtle stranding about ascending colon near the hepatic flexure as above, could represent underlying colitis. Given 1L, zofran and bentyl in the ED and admitted to hospital medicine.

## 2024-07-01 NOTE — ED PROVIDER NOTES
Encounter Date: 2024       History     Chief Complaint   Patient presents with    Diarrhea     Diarrhea for 5 d,      73 y.o. female with GERD, peripheral artery disease, DM, history of DVT, COPD presents for diarrhea and abdominal pain.  Patient reports 4-5 days of right-sided abdominal cramping with associated diarrhea.  She reports the diarrhea is watery.  She reports nausea but has not vomited.  She also reports pain on the right side of her abdomen that feels slightly sharp.  She does report a small spot of blood from a known hemorrhoid but denies any other blood in her stool.  She denies any international travel, recent antibiotic use, recent hospitalizations, dysuria    The history is provided by the patient and medical records.     Review of patient's allergies indicates:   Allergen Reactions    Clindamycin Hives and Swelling    Iodinated contrast media Rash     Past Medical History:   Diagnosis Date    Asthma     Chronic obstructive pulmonary disease, unspecified COPD type 2022    Constipation 10/3/2019    Deep vein thrombophlebitis of left leg 2012    Deep vein thrombosis     when she had a knee replacement    Diabetic foot ulcer with osteomyelitis     Encounter for blood transfusion     anemic     GERD (gastroesophageal reflux disease)     Obesity, diabetes, and hypertension syndrome 2019    Obstructive sleep apnea 2012    PAD (peripheral artery disease) 2013    Pressure ulcer of toe of left foot     Type 2 diabetes mellitus with obesity 2020    Type 2 diabetes mellitus with peripheral artery disease 2020     Past Surgical History:   Procedure Laterality Date    CATARACT EXTRACTION W/  INTRAOCULAR LENS IMPLANT Left 3/2002    left     CATARACT EXTRACTION W/  INTRAOCULAR LENS IMPLANT Right     OD dr. olivares     SECTION      COLONOSCOPY N/A 2018    Procedure: COLONOSCOPY;  Surgeon: Faizan Mac MD;  Location: [facility] (2ND FLR);   Service: Endoscopy;  Laterality: N/A;    COLONOSCOPY N/A 4/26/2023    Procedure: COLONOSCOPY;  Surgeon: Pa Zamora MD;  Location: [facility] (2ND FLR);  Service: Endoscopy;  Laterality: N/A;    CYST REMOVAL  2/11/2011    left side of face    CYSTOSCOPY W/ RETROGRADES Left 2/26/2020    Procedure: CYSTOSCOPY, WITH RETROGRADE PYELOGRAM;  Surgeon: Noman Rivas MD;  Location: [address] FLR;  Service: Urology;  Laterality: Left;  30min    DE QUERVAIN'S RELEASE  1/8/2021    Procedure: RELEASE, HAND, FOR DEQUERVAIN'S TENOSYNOVITIS;  Surgeon: Marky Hagen MD;  Location: [facility];  Service: Orthopedics;;    ESOPHAGOGASTRODUODENOSCOPY N/A 4/26/2023    Procedure: EGD (ESOPHAGOGASTRODUODENOSCOPY);  Surgeon: Pa Zamora MD;  Location: [facility] (2ND FLR);  Service: Endoscopy;  Laterality: N/A;  suprep-[facility] mail-bmi 49.09-tb  4/20/23- No answer for precall- [place]    EYE SURGERY Bilateral 2012    eye implants    GANGLION CYST EXCISION  11/13/2007    Right wrist    INJECTION OF ANESTHETIC AGENT AROUND MEDIAL BRANCH NERVES INNERVATING LUMBAR FACET JOINT Bilateral 4/5/2022    Procedure: Block-nerve-medial branch-lumbar bilateral L4-5 and L5-S1;  Surgeon: Alireza Meraz Jr., MD;  Location: [facility] PAIN MGT;  Service: Pain Management;  Laterality: Bilateral;  pt is diabetic   asa    INJECTION OF ANESTHETIC AGENT AROUND MEDIAL BRANCH NERVES INNERVATING LUMBAR FACET JOINT Bilateral 4/19/2022    Procedure: Block-nerve-medial branch-lumbar bilaterl L4-5 and L5-S1;  Surgeon: Alireza Meraz Jr., MD;  Location: [facility]IN [facility]GT;  Service: Pain Management;  Laterality: Bilateral;  pt is diabetic     INJECTION OF FACET JOINT Bilateral 5/6/2021    Procedure: INJECTION, FACET JOINT--Bilateral L4-5, L5-S1;  Surgeon: Alireza Meraz Jr., MD;  Location: [facility];  Service: Pain Management;  Laterality: Bilateral;  Oral    INTERPOSITION ARTHROPLASTY OF CARPOMETACARPAL JOINTS Left 1/8/2021    Procedure: INTERPOSITION  ARTHROPLASTY, CMC JOINT - left dequervains and cmc arthroplasty, arthrex;  Surgeon: Marky Hagen MD;  Location: [facility] OR;  Service: Orthopedics;  Laterality: Left;    JOINT REPLACEMENT Left     Pan Retinal Photocoagulation Bilateral     Dr. Monterroso (Proliferative Diabetic Retinopathy)    RADIOFREQUENCY ABLATION OF LUMBAR MEDIAL BRANCH NERVE AT SINGLE LEVEL Bilateral 12/20/2023    Procedure: Radiofrequency Ablation, Nerve, Spinal, Lumbar, bilateral L4/5 L5/S1;  Surgeon: Alejandrina Roa DO;  Location: [facility]IN [facility];  Service: Pain Management;  Laterality: Bilateral;  diabetic  asa    RADIOFREQUENCY THERMOCOAGULATION Right 5/12/2022    Procedure: RADIOFREQUENCY THERMAL COAGULATION RIGHT L4-5, L5-S1 (IV Sedation);  Surgeon: Alireza Meraz Jr., MD;  Location: [facility]IN [facility]GT;  Service: Pain Management;  Laterality: Right;  diabetic    RADIOFREQUENCY THERMOCOAGULATION Left 5/19/2022    Procedure: RADIOFREQUENCY THERMAL COAGULATION LEFT L4-5, L5-S1 (IV Sedation);  Surgeon: Alireza Meraz Jr., MD;  Location: [facility]IN [facility]GT;  Service: Pain Management;  Laterality: Left;  diabetic    TOTAL ABDOMINAL HYSTERECTOMY  1982    TOTAL KNEE ARTHROPLASTY  2/2006    left    TRIGGER FINGER RELEASE  9/18/2009     Family History   Problem Relation Name Age of Onset    Diabetes Mother      Hypertension Mother      Heart disease Father      Diabetes Sister X4     No Known Problems Sister unknown hx     No Known Problems Brother X2-unknown hx     Thyroid disease Brother X4     Diabetes Brother X4     Hypertension Brother X4     No Known Problems Daughter      No Known Problems Daughter      No Known Problems Son      Amblyopia Neg Hx      Blindness Neg Hx      Glaucoma Neg Hx      Macular degeneration Neg Hx      Retinal detachment Neg Hx      Strabismus Neg Hx      Colon cancer Neg Hx      Esophageal cancer Neg Hx       Social History     Tobacco Use    Smoking status: Never    Smokeless tobacco: Never   Substance Use Topics     Alcohol use: No    Drug use: No     Review of Systems   Reason unable to perform ROS: See HPI for relevant ROS.       Physical Exam     Initial Vitals [07/01/24 1141]   BP Pulse Resp Temp SpO2   (!) 116/55 68 20 98.8 °F (37.1 °C) 96 %      MAP       --         Physical Exam    Nursing note and vitals reviewed.  Constitutional:   Alert, normal work of breathing, no acute distress   Eyes: Conjunctivae are normal. No scleral icterus.   Cardiovascular:  Normal rate, regular rhythm and intact distal pulses.           Pulmonary/Chest: No stridor. No respiratory distress.   Abdominal:   Abdomen is soft and nondistended, moderate right upper quadrant tenderness   Musculoskeletal:         General: No edema.     Neurological: She is alert.   Skin: Skin is warm and dry. Capillary refill takes more than 3 seconds.         ED Course   Procedures  Labs Reviewed   CBC W/ AUTO DIFFERENTIAL - Abnormal; Notable for the following components:       Result Value    MCHC 30.9 (*)     RDW 15.5 (*)     Immature Granulocytes 0.6 (*)     Gran # (ANC) 8.7 (*)     Immature Grans (Abs) 0.06 (*)     Lymph # 0.9 (*)     Gran % 81.3 (*)     Lymph % 8.5 (*)     All other components within normal limits    Narrative:     Release to patient->Immediate   COMPREHENSIVE METABOLIC PANEL - Abnormal; Notable for the following components:    Glucose 223 (*)     BUN 27 (*)     Creatinine 2.2 (*)     eGFR 23.0 (*)     All other components within normal limits    Narrative:     Release to patient->Immediate   ISTAT PROCEDURE - Abnormal; Notable for the following components:    POC Glucose 220 (*)     POC Creatinine 2.3 (*)     POC Sodium 135 (*)     POC Hematocrit 27 (*)     All other components within normal limits   POCT GLUCOSE - Abnormal; Notable for the following components:    POCT Glucose 196 (*)     All other components within normal limits   CLOSTRIDIUM DIFFICILE   CULTURE, STOOL   HIV 1 / 2 ANTIBODY    Narrative:     Release to patient->Immediate    HEPATITIS C ANTIBODY    Narrative:     Release to patient->Immediate   LIPASE    Narrative:     Release to patient->Immediate   SARS-COV-2 RNA AMPLIFICATION, QUAL   URINALYSIS, REFLEX TO URINE CULTURE   URINALYSIS, REFLEX TO URINE CULTURE   WBC, STOOL   POCT GLUCOSE, HAND-HELD DEVICE   ISTAT LACTATE   ISTAT CHEM8          Imaging Results              CT Abdomen Pelvis  Without Contrast (Final result)  Result time 07/01/24 13:41:22      Final result by John Goff IV, MD (07/01/24 13:41:22)                   Impression:      Subtle stranding about ascending colon near the hepatic flexure as above, could represent underlying colitis.  Recommend clinical correlation.  Few prominent right mesenteric lymph nodes, possibly reactive.    Additional findings as above.      Electronically signed by: John Goff  Date:    07/01/2024  Time:    13:41               Narrative:    EXAMINATION:  CT ABDOMEN PELVIS WITHOUT CONTRAST    CLINICAL HISTORY:  Nausea/vomiting;RLQ abdominal pain (Age >= 14y);    TECHNIQUE:  Low dose axial images, sagittal and coronal reformations were obtained from the lung bases to the pubic symphysis.  Oral contrast was not administered.    COMPARISON:  CT 01/12/2020    FINDINGS:  Lung bases are well aerated.  No focal consolidation.  No significant pleural fluid.  Prominent calcification of mitral valve annulus.  Otherwise partially imaged heart is unremarkable.    Liver appears within normal limits for size.  No focal lesion noting limited noncontrast assessment.  No cholelithiasis.  No evidence acute cholecystitis.  No abnormal biliary duct dilatation.    Pancreas, spleen, bilateral adrenal glands appear within normal limits.    Bowel kidneys are normal in size and location.  Mild nonspecific bilateral perinephric stranding.  Bilateral hypodensities compatible with cysts, largest on the right measuring 4.2 cm.  No convincing renal calculi.  No hydronephrosis or hydroureter.    Bladder is  mildly distended.  No focal wall thickening.    Uterus is surgically absent.  No adnexal mass.    Stomach and duodenum are unremarkable.  Normal caliber small bowel.  Mild soft tissue stranding about ascending colon near the hepatic flexure, component of this may be related to motion artifact.  Few prominent right lower quadrant mesenteric lymph nodes.  Appendix is not definitely visualized.  No evidence of bowel obstruction.  No free intraperitoneal air.  No significant ascites.    No suspicious retroperitoneal lymphadenopathy.    Abdominal aorta demonstrates normal caliber and course with moderate calcific atherosclerotic plaque.    Continued skin thickening within lower anterior abdominal wall, similar previous CT from 2020.    Degenerative changes throughout the spine.  Stable sclerosis within L3 vertebral body.  No acute fracture.  No aggressive osseous lesion.                                       Medications   sodium chloride 0.9% flush 10 mL (has no administration in time range)   albuterol-ipratropium 2.5 mg-0.5 mg/3 mL nebulizer solution 3 mL (has no administration in time range)   melatonin tablet 6 mg (has no administration in time range)   ondansetron disintegrating tablet 8 mg (has no administration in time range)   ondansetron injection 4 mg (has no administration in time range)   polyethylene glycol packet 17 g (has no administration in time range)   simethicone chewable tablet 80 mg (has no administration in time range)   aluminum-magnesium hydroxide-simethicone 200-200-20 mg/5 mL suspension 30 mL (has no administration in time range)   naloxone 0.4 mg/mL injection 0.02 mg (has no administration in time range)   glucose chewable tablet 16 g (has no administration in time range)   glucose chewable tablet 24 g (has no administration in time range)   glucagon (human recombinant) injection 1 mg (has no administration in time range)   heparin (porcine) injection 5,000 Units (has no administration in time  range)   insulin aspart U-100 pen 0-5 Units (has no administration in time range)   dextrose 10% bolus 125 mL 125 mL (has no administration in time range)   dextrose 10% bolus 250 mL 250 mL (has no administration in time range)   amLODIPine tablet 10 mg (has no administration in time range)   aspirin chewable tablet 81 mg (has no administration in time range)   atorvastatin tablet 80 mg (has no administration in time range)   DULoxetine DR capsule 60 mg (has no administration in time range)   labetaloL tablet 300 mg (has no administration in time range)   pregabalin capsule 150 mg (has no administration in time range)   acetaminophen tablet 1,000 mg (has no administration in time range)   dicyclomine capsule 10 mg (has no administration in time range)   acetaminophen tablet 1,000 mg (1,000 mg Oral Given 7/1/24 1358)   ondansetron injection 4 mg (4 mg Intravenous Given 7/1/24 1359)   lactated ringers bolus 1,000 mL (1,000 mLs Intravenous New Bag 7/1/24 1403)   dicyclomine capsule 20 mg (20 mg Oral Given 7/1/24 1531)     Medical Decision Making  73 y.o. female with GERD, peripheral artery disease, DM, history of DVT, COPD presents for diarrhea and abdominal pain.  Differentials include gastroenteritis, colitis, dehydration, metabolic derangement, cholecystitis, appendicitis, C diff, infectious diarrhea  Patient presenting with a watery diarrhea over the past 5 days, with associated right-sided abdominal cramping.  She is mildly tender on the right side of the abdomen.  She was alert and well-appearing, hemodynamically stable, abdomen is soft no peritonitic signs  Patient does report 4-5 or more watery stools per day.  No international travel, no blood in her stool apart from some mild bleeding from a known hemorrhoid, no significant volume/lana bleeding into the toilet bowl  No urinary symptoms, no chest pain or upper abdominal pain  C diff testing was ordered out of precaution as patient continued to have watery  diarrhea in the ED though she has no recent antibiotic use, no recent hospitalization  Patient found to have worsening kidney function, additional IV fluids were ordered, patient admitted to hospital Med    Amount and/or Complexity of Data Reviewed  Labs:  Decision-making details documented in ED Course.  Radiology: ordered. Decision-making details documented in ED Course.    Risk  OTC drugs.  Prescription drug management.               ED Course as of 07/01/24 1646   Mon Jul 01, 2024   1253 CBC W/ AUTO DIFFERENTIAL(!)  No leukocytosis or anemia [OK]   1417 Creatinine(!): 2.2  Mildly elevated compared to prior [OK]   1417 CT Abdomen Pelvis  Without Contrast  Possible colitis of the splenic flexure [OK]   1417 POC Lactate: 1.38 [OK]      ED Course User Index  [OK] Terell Shah MD                           Clinical Impression:  Final diagnoses:  [K52.9] Colitis (Primary)  [E86.0] Dehydration          ED Disposition Condition    Observation                 Terell Shah MD  07/01/24 9832

## 2024-07-01 NOTE — ED TRIAGE NOTES
Pt presents to the ED complaining of nausea and diarrhea for 5 days. Pt states the symptoms have progressively worsened since it started last Thursday 06/27. Pt denies chest pain, SOB, and vomiting at this time. Pt has been taking pepto for her symptoms with no relief.

## 2024-07-01 NOTE — ED NOTES
I-STAT Chem-8+ Results:   Value Reference Range   Sodium 135 136-145 mmol/L   Potassium  5.1 3.5-5.1 mmol/L   Chloride 102  mmol/L   Ionized Calcium 1.27 1.06-1.42 mmol/L   CO2 (measured) 24 23-29 mmol/L   Glucose 220  mg/dL   BUN 29 6-30 mg/dL   Creatinine 2.3 0.5-1.4 mg/dL   Hematocrit 27 36-54%

## 2024-07-01 NOTE — FIRST PROVIDER EVALUATION
"Medical screening examination initiated.  I have conducted a focused provider triage encounter, findings are as follows:    Brief history of present illness:  72 y/o F with hx of asthma, COPD, DVT, DM, PAD, GERD presenting with abd pain and diarrhea associated with fever.     Vitals:    07/01/24 1141   BP: (!) 116/55   Pulse: 68   Resp: 20   Temp: 98.8 °F (37.1 °C)   TempSrc: Oral   SpO2: 96%   Weight: 130.6 kg (288 lb)   Height: 5' 5" (1.651 m)       Pertinent physical exam:  wheelchair, dry skin    Brief workup plan:  abd pain, labs    Preliminary workup initiated; this workup will be continued and followed by the physician or advanced practice provider that is assigned to the patient when roomed.    Tan Bryant DO, FAAEM  Emergency Staff Physician   Dept of Emergency Medicine   [facility]  Spectralink: [id number]        Disclaimer: This note has been generated using voice-recognition software. There may be typographical errors that have been missed during proof-reading.    "

## 2024-07-01 NOTE — SUBJECTIVE & OBJECTIVE
Initial SW/CM Assessment/Plan of Care Note     Baseline Assessment  60 year old admitted 4/5/2024 as Observation with a diagnosis of SELENA.  Prior to admission patient was living with Alone and residing at Apartment.  Patient has a Legal Guardian, designated guardian is Mustapha Rivero. Patient’s Primary Care Provider is Maya Salcedo APNP.     Progress Note  Weekend SW coverage:     SW consulted for discharge planning with comment \"Patient with activated SDM, concerns about being able to take care of self at home. Currently lives alone and has nurse come by 1-3 times per week. Will need placement but not sure what type.\" Pt admitted for SELENA. Per notes, pt limited due to mental health diagnosis.     SW called pt's legal guardian Mustapha. Left voicemail with request for callback to 9T unit SW.     SW called pt's [facility]  Gay and left voicemail at number [phone]. Requested callback to 9T SW. SW also called Gay's [phone] but number continued to ring and SW could not leave voicemail.     Pt was previously active with [facility] for home therapy prior to this hospitalization. Per [facility] home therapy notes, pt is independent with mobility with occasional use of walker. Pt is responsible for housekeeping, laundry, and does shop for food independently. Pt lives alone in first floor apartment.     Pt was previously hospitalized at [facility] and discharged on 3/15/24. Per [facility] SW notes, pt's [facility]  Gay assist pt with \"setting up pt medication, gives pt rides to appointments, and in general advocates for pt.\" Pt discharged home with [facility] on 3/15/24.    Per notes, Gay has been working to have pt enrolled in case management services like Family [facility] but seeking guardian permission.     PT/OT ordered; will await therapy recommendations.     SW will continue to follow to assist with discharge planning.     Plan  Patient/Family Discharge Goal: Home, Home  Past Medical History:   Diagnosis Date    Asthma     Chronic obstructive pulmonary disease, unspecified COPD type 2022    Constipation 10/3/2019    Deep vein thrombophlebitis of left leg 2012    Deep vein thrombosis     when she had a knee replacement    Diabetic foot ulcer with osteomyelitis     Encounter for blood transfusion     anemic     GERD (gastroesophageal reflux disease)     Obesity, diabetes, and hypertension syndrome 2019    Obstructive sleep apnea 2012    PAD (peripheral artery disease) 2013    Pressure ulcer of toe of left foot     Type 2 diabetes mellitus with obesity 2020    Type 2 diabetes mellitus with peripheral artery disease 2020       Past Surgical History:   Procedure Laterality Date    CATARACT EXTRACTION W/  INTRAOCULAR LENS IMPLANT Left 3/2002    left     CATARACT EXTRACTION W/  INTRAOCULAR LENS IMPLANT Right     OD dr. olivares     SECTION      COLONOSCOPY N/A 2018    Procedure: COLONOSCOPY;  Surgeon: Faizan Mac MD;  Location: [facility] (2ND FLR);  Service: Endoscopy;  Laterality: N/A;    COLONOSCOPY N/A 2023    Procedure: COLONOSCOPY;  Surgeon: Pa Zamora MD;  Location: [facility] (2ND FLR);  Service: Endoscopy;  Laterality: N/A;    CYST REMOVAL  2011    left side of face    CYSTOSCOPY W/ RETROGRADES Left 2020    Procedure: CYSTOSCOPY, WITH RETROGRADE PYELOGRAM;  Surgeon: Noman Rivas MD;  [address]  Service: Urology;  Laterality: Left;  30min    DE QUERVAIN'S RELEASE  2021    Procedure: RELEASE, HAND, FOR DEQUERVAIN'S TENOSYNOVITIS;  Surgeon: Marky Hagen MD;  Location: [facility];  Service: Orthopedics;;    ESOPHAGOGASTRODUODENOSCOPY N/A 2023    Procedure: EGD (ESOPHAGOGASTRODUODENOSCOPY);  Surgeon: Pa Zamora MD;  Location: [facility] (2ND FLR);  Service: Endoscopy;  Laterality: N/A;  suprep-inst mail-bmi 49.09-tb  23- No answer for precall- [place]    EYE  Care, Home therapy   Is patient/family goal achievable: Yes    / - Recommendations for Discharge: Home, Home care, Home therapy     Barriers to Discharge  Identified Barriers to Discharge/Transition Planning:          Anticipate patient will need post-hospital services. Necessary services are available.  Anticipate patient can return to the environment from which patient entered the hospital.   Anticipate patient can provide self-care at discharge.    Refer to / Flowsheet for objective data.     Medical History  Past Medical History:   Diagnosis Date    Abscess of left leg excluding foot 09/15/2021    Allergic rhinitis     Asthma     Bipolar disorder (CMD)     Chronic back pain     Depression     Diabetes mellitus (CMD)     Hemorrhoids 10/13/2014    HTN (hypertension)     Medicare annual wellness visit, initial 08/26/2013    Myocardial infarction (CMD)     Pneumonia of left lower lobe due to infectious organism 11/30/2023    Schizophrenia (CMD)        Prior to Admission Status  Functional Status  Ambulation: Independent/Self, Walker  Bathing: Independent/Self  Dressing: Independent/Self  Toileting: Independent/Self  Meal Preparation: Independent/Self  Shopping: Other (comment) ([facility] Bates)  Medication Administration: Other (comment) ([facility]  Viji Bates)  Housekeeping: Independent/Self  Transportation: Other (comment) ([facility]  [facility] Bates)    Agency/Support  Type of Services Prior to Hospitalization:                 Support Systems: /   Home Devices/Equipment:           Mobility Assist Devices: Standard walker  Sensory Support Devices:      Prior Function  Bed Mobility: Independent (04/07/24 1143 : Chidi Russ)  Transfers: Independent (04/07/24 1143 : Chidi Russ)          Current Function  Last Filed Values       None            Therapy Recommendations for Discharge:   PT:      Last Filed Values       None       SURGERY Bilateral 2012    eye implants    GANGLION CYST EXCISION  11/13/2007    Right wrist    INJECTION OF ANESTHETIC AGENT AROUND MEDIAL BRANCH NERVES INNERVATING LUMBAR FACET JOINT Bilateral 4/5/2022    Procedure: Block-nerve-medial branch-lumbar bilateral L4-5 and L5-S1;  Surgeon: Alireza Meraz Jr., MD;  Location: [facility] PAIN MGT;  Service: Pain Management;  Laterality: Bilateral;  pt is diabetic   asa    INJECTION OF ANESTHETIC AGENT AROUND MEDIAL BRANCH NERVES INNERVATING LUMBAR FACET JOINT Bilateral 4/19/2022    Procedure: Block-nerve-medial branch-lumbar bilaterl L4-5 and L5-S1;  Surgeon: Alireza Meraz Jr., MD;  Location: [facility]IN [facility]GT;  Service: Pain Management;  Laterality: Bilateral;  pt is diabetic     INJECTION OF FACET JOINT Bilateral 5/6/2021    Procedure: INJECTION, FACET JOINT--Bilateral L4-5, L5-S1;  Surgeon: Alireza Meraz Jr., MD;  Location: [facility]IN [facility]GT;  Service: Pain Management;  Laterality: Bilateral;  Oral    INTERPOSITION ARTHROPLASTY OF CARPOMETACARPAL JOINTS Left 1/8/2021    Procedure: INTERPOSITION ARTHROPLASTY, CMC JOINT - left dequervains and cmc arthroplasty, arthrex;  Surgeon: Marky Hagen MD;  Location: [facility] OR;  Service: Orthopedics;  Laterality: Left;    JOINT REPLACEMENT Left     Pan Retinal Photocoagulation Bilateral     Dr. Monterroso (Proliferative Diabetic Retinopathy)    RADIOFREQUENCY ABLATION OF LUMBAR MEDIAL BRANCH NERVE AT SINGLE LEVEL Bilateral 12/20/2023    Procedure: Radiofrequency Ablation, Nerve, Spinal, Lumbar, bilateral L4/5 L5/S1;  Surgeon: Alejandrina Roa DO;  Location: [facility]IN [facility];  Service: Pain Management;  Laterality: Bilateral;  diabetic  asa    RADIOFREQUENCY THERMOCOAGULATION Right 5/12/2022    Procedure: RADIOFREQUENCY THERMAL COAGULATION RIGHT L4-5, L5-S1 (IV Sedation);  Surgeon: Alireza Meraz Jr., MD;  Location: [facility];  Service: Pain Management;  Laterality: Right;  diabetic    RADIOFREQUENCY THERMOCOAGULATION Left      OT:       Last Filed Values       None          SLP:    Last Filed Values       None            Insurance  Primary: [employer]  Secondary: [employer]    Disposition Recommendations:  SW/CM recommendation for discharge: Home, Home care, Home therapy            "2022    Procedure: RADIOFREQUENCY THERMAL COAGULATION LEFT L4-5, L5-S1 (IV Sedation);  Surgeon: Alireza Meraz Jr., MD;  Location: [facility];  Service: Pain Management;  Laterality: Left;  diabetic    TOTAL ABDOMINAL HYSTERECTOMY  1982    TOTAL KNEE ARTHROPLASTY  2006    left    TRIGGER FINGER RELEASE  2009       Review of patient's allergies indicates:   Allergen Reactions    Clindamycin Hives and Swelling    Iodinated contrast media Rash       Current Facility-Administered Medications on File Prior to Encounter   Medication    triamcinolone acetonide injection 10 mg    triamcinolone acetonide injection 10 mg     Current Outpatient Medications on File Prior to Encounter   Medication Sig    albuterol (PROAIR HFA) 90 mcg/actuation inhaler Inhale 2 puffs into the lungs every 6 (six) hours as needed for Wheezing. Rescue    allopurinoL (ZYLOPRIM) 300 MG tablet     amLODIPine (NORVASC) 10 MG tablet TAKE 1 TABLET BY MOUTH ONCE  DAILY    aspirin 81 MG Chew Take 1 tablet (81 mg total) by mouth once daily.    atorvastatin (LIPITOR) 80 MG tablet Take 1 tablet (80 mg total) by mouth every evening.    BD ULTRA-FINE KIKA PEN NEEDLE 32 gauge x 5/32" Ndle USE 4 TIMES DAILY    blood sugar diagnostic Strp 1 strip 4 times daily. Contour Next.    blood-glucose meter kit 1 each by Other route once daily. Use daily. Insurance proffered one touch  E11.42    blood-glucose sensor (DEXCOM G7 SENSOR MISC) by Misc.(Non-Drug; Combo Route) route.    chlorthalidone (HYGROTEN) 25 MG Tab Take 1 tablet (25 mg total) by mouth once daily.    CONSTULOSE 10 gram/15 mL solution SMARTSI Milliliter(s) By Mouth Twice Daily PRN    DEXCOM G7  Misc Use as directed    DEXCOM G7 SENSOR Terese 1 Device by Misc.(Non-Drug; Combo Route) route every 10 days.    dulaglutide (TRULICITY) 4.5 mg/0.5 mL pen injector INJECT THE CONTENTS OF ONE PEN  SUBCUTANEOUSLY WEEKLY AS  DIRECTED    DULoxetine (CYMBALTA) 30 MG capsule Take 2 capsules (60 mg " total) by mouth once daily.    glucagon (GVOKE HYPOPEN 1-PACK) 0.5 mg/0.1 mL AtIn Inject 1 Dose into the skin daily as needed (for severe hypoglycemia if you aren't able to treat by ingesting sugar).    insulin lispro (HUMALOG KWIKPEN INSULIN) 100 unit/mL pen INJECT 13 UNITS INTO THE  SKIN 3 TIMES DAILY BEFORE  MEALS PLUS SLIDING SCALE -  MAX TOTAL DAILY DOSE 70  UNITS    irbesartan (AVAPRO) 300 MG tablet Take 1 tablet (300 mg total) by mouth every evening.    JARDIANCE 10 mg tablet TAKE 1 TABLET BY MOUTH ONCE  DAILY    ketoconazole (NIZORAL) 2 % cream Apply topically once daily.    ketoconazole (NIZORAL) 2 % shampoo Wash hair with medicated shampoo at least 2x/week - let sit on scalp at least 5 minutes prior to rinsing    labetaloL (NORMODYNE) 300 MG tablet Take 1 tablet (300 mg total) by mouth 2 (two) times daily.    lancets 31 gauge Misc 1 lancet by Misc.(Non-Drug; Combo Route) route 4 (four) times daily. E11.42 . Prescribed one touch    mometasone (ELOCON) 0.1 % ointment Apply topically once daily. Apply twice daily 1 week on, 1 week off for 3-6 months    multivitamin (THERAGRAN) per tablet Take 1 tablet by mouth once daily.    pregabalin (LYRICA) 150 MG capsule Take 1 capsule (150 mg total) by mouth 2 (two) times daily.    sodium bicarbonate 325 MG tablet Take 2 tablets (650 mg total) by mouth 2 (two) times daily.    TRESIBA FLEXTOUCH U-100 100 unit/mL (3 mL) insulin pen Inject 45 Units into the skin every evening.    [DISCONTINUED] insulin glargine, TOUJEO, (TOUJEO SOLOSTAR) 300 unit/mL (1.5 mL) InPn pen INJECT 40 UNITS INTO THE SKIN EVERY EVENING TITRATE UP AS DIRECTED.     Family History       Problem Relation (Age of Onset)    Diabetes Mother, Sister, Brother    Heart disease Father    Hypertension Mother, Brother    No Known Problems Sister, Brother, Daughter, Daughter, Son    Thyroid disease Brother          Tobacco Use    Smoking status: Never    Smokeless tobacco: Never   Substance and Sexual Activity     Alcohol use: No    Drug use: No    Sexual activity: Yes     Partners: Male     Review of Systems   Constitutional:  Negative for activity change, chills and fever.   HENT:  Negative for trouble swallowing.    Eyes:  Negative for photophobia and visual disturbance.   Respiratory:  Negative for cough, chest tightness and shortness of breath.    Cardiovascular:  Negative for chest pain, palpitations and leg swelling.   Gastrointestinal:  Positive for abdominal pain, diarrhea and nausea. Negative for constipation and vomiting.   Genitourinary:  Negative for dysuria, frequency and hematuria.   Musculoskeletal:  Negative for back pain, gait problem and neck pain.   Skin:  Negative for rash and wound.   Neurological:  Negative for dizziness, syncope, speech difficulty and light-headedness.   Psychiatric/Behavioral:  Negative for agitation and confusion. The patient is not nervous/anxious.      Objective:     Vital Signs (Most Recent):  Temp: 98.8 °F (37.1 °C) (07/01/24 1141)  Pulse: 66 (07/01/24 1530)  Resp: 18 (07/01/24 1530)  BP: (!) 143/63 (07/01/24 1530)  SpO2: 96 % (07/01/24 1530) Vital Signs (24h Range):  Temp:  [98.8 °F (37.1 °C)] 98.8 °F (37.1 °C)  Pulse:  [62-68] 66  Resp:  [18-22] 18  SpO2:  [95 %-99 %] 96 %  BP: (116-157)/(55-70) 143/63     Weight: 130.6 kg (288 lb)  Body mass index is 47.93 kg/m².     Physical Exam  Vitals and nursing note reviewed.   Constitutional:       Appearance: She is well-developed.      Comments: Uncomfortable appearing     HENT:      Head: Normocephalic and atraumatic.      Mouth/Throat:      Mouth: Mucous membranes are dry.      Pharynx: No oropharyngeal exudate.   Eyes:      Conjunctiva/sclera: Conjunctivae normal.      Pupils: Pupils are equal, round, and reactive to light.   Cardiovascular:      Rate and Rhythm: Normal rate and regular rhythm.      Heart sounds: Normal heart sounds.   Pulmonary:      Effort: Pulmonary effort is normal. No respiratory distress.      Breath  sounds: Normal breath sounds. No wheezing.   Abdominal:      General: Bowel sounds are normal. There is no distension.      Palpations: Abdomen is soft.      Tenderness: There is abdominal tenderness (diffuse tenderness, R>L).   Musculoskeletal:         General: No tenderness. Normal range of motion.      Cervical back: Normal range of motion and neck supple.   Lymphadenopathy:      Cervical: No cervical adenopathy.   Skin:     General: Skin is warm and dry.      Capillary Refill: Capillary refill takes less than 2 seconds.      Findings: No rash.   Neurological:      Mental Status: She is alert and oriented to person, place, and time.      Cranial Nerves: No cranial nerve deficit.      Sensory: No sensory deficit.      Coordination: Coordination normal.   Psychiatric:         Behavior: Behavior normal.         Thought Content: Thought content normal.         Judgment: Judgment normal.              CRANIAL NERVES     CN III, IV, VI   Pupils are equal, round, and reactive to light.       Significant Labs: All pertinent labs within the past 24 hours have been reviewed.  CBC:   Recent Labs   Lab 07/01/24  1231 07/01/24  1311   WBC 10.69  --    HGB 12.0  --    HCT 38.8 27*     --      CMP:   Recent Labs   Lab 07/01/24  1231      K 4.2      CO2 23   *   BUN 27*   CREATININE 2.2*   CALCIUM 9.8   PROT 6.6   ALBUMIN 3.5   BILITOT 0.4   ALKPHOS 114   AST 17   ALT 15   ANIONGAP 10       Significant Imaging: I have reviewed all pertinent imaging results/findings within the past 24 hours.

## 2024-07-01 NOTE — H&P
[facility] - Emergency Dept  Hospital Medicine  History & Physical    Patient Name: Annika Mac  MRN: [id number]  Patient Class: OP- Observation  Admission Date: 7/1/2024  Attending Physician: Meir Walsh MD   Primary Care Provider: Mamie Cotton MD         Patient information was obtained from patient and ER records.     Subjective:     Principal Problem:Colitis    Chief Complaint:   Chief Complaint   Patient presents with    Diarrhea     Diarrhea for 5 d,         HPI: Annika Mac is a 73 y.o. female with GERD, peripheral artery disease, DM2, history of DVT, COPD presents for diarrhea and abdominal pain. Patient reports 4-5 days of right-sided abdominal cramping with associated diarrhea. She reports the diarrhea is watery with > 10 episodes of diarrhea a day. Has associated with nausea but no vomiting. Describes the abdominal pain as sharp. She does report a small spot of blood from a known hemorrhoid but denies any other blood in her stool. She denies any international travel, recent antibiotic use, recent hospitalizations, dysuria, fevers, chills.     ED: mildly hypertensive, otherwise VSS. CBC without leukocytosis, CMP with Cr 2.2 (baseline ~1.6). Covid negative. CT a/p with subtle stranding about ascending colon near the hepatic flexure as above, could represent underlying colitis. Given 1L, zofran and bentyl in the ED and admitted to hospital medicine.     Past Medical History:   Diagnosis Date    Asthma     Chronic obstructive pulmonary disease, unspecified COPD type 1/14/2022    Constipation 10/3/2019    Deep vein thrombophlebitis of left leg 7/27/2012    Deep vein thrombosis     when she had a knee replacement    Diabetic foot ulcer with osteomyelitis     Encounter for blood transfusion     anemic     GERD (gastroesophageal reflux disease)     Obesity, diabetes, and hypertension syndrome 2/13/2019    Obstructive sleep apnea 7/27/2012    PAD (peripheral artery disease) 11/21/2013    Pressure  ulcer of toe of left foot     Type 2 diabetes mellitus with obesity 2020    Type 2 diabetes mellitus with peripheral artery disease 2020       Past Surgical History:   Procedure Laterality Date    CATARACT EXTRACTION W/  INTRAOCULAR LENS IMPLANT Left 3/2002    left     CATARACT EXTRACTION W/  INTRAOCULAR LENS IMPLANT Right     OD dr. olivares     SECTION      COLONOSCOPY N/A 2018    Procedure: COLONOSCOPY;  Surgeon: Faizan Mac MD;  Location: [facility] ENDO (2ND FLR);  Service: Endoscopy;  Laterality: N/A;    COLONOSCOPY N/A 2023    Procedure: COLONOSCOPY;  Surgeon: Pa Zamora MD;  Location: [facility] ENDO (2ND FLR);  Service: Endoscopy;  Laterality: N/A;    CYST REMOVAL  2011    left side of face    CYSTOSCOPY W/ RETROGRADES Left 2020    Procedure: CYSTOSCOPY, WITH RETROGRADE PYELOGRAM;  Surgeon: Noman Rivas MD;  [address];  Service: Urology;  Laterality: Left;  30min    DE QUERVAIN'S RELEASE  2021    Procedure: RELEASE, HAND, FOR DEQUERVAIN'S TENOSYNOVITIS;  Surgeon: Marky Hagen MD;  Location: [facility] OR;  Service: Orthopedics;;    ESOPHAGOGASTRODUODENOSCOPY N/A 2023    Procedure: EGD (ESOPHAGOGASTRODUODENOSCOPY);  Surgeon: Pa Zamora MD;  Location: [facility] (2ND FLR);  Service: Endoscopy;  Laterality: N/A;  suprep-inst mail-bmi 49.09-tb  23- No answer for precall- [place]    EYE SURGERY Bilateral     eye implants    GANGLION CYST EXCISION  2007    Right wrist    INJECTION OF ANESTHETIC AGENT AROUND MEDIAL BRANCH NERVES INNERVATING LUMBAR FACET JOINT Bilateral 2022    Procedure: Block-nerve-medial branch-lumbar bilateral L4-5 and L5-S1;  Surgeon: Alireza Meraz Jr., MD;  [facility];  Service: Pain Management;  Laterality: Bilateral;  pt is diabetic   asa    INJECTION OF ANESTHETIC AGENT AROUND MEDIAL BRANCH NERVES INNERVATING LUMBAR FACET JOINT Bilateral 2022    Procedure:  Block-nerve-medial branch-lumbar bilaterl L4-5 and L5-S1;  Surgeon: Alireza Meraz Jr., MD;  Location: [facility] PAIN MGT;  Service: Pain Management;  Laterality: Bilateral;  pt is diabetic     INJECTION OF FACET JOINT Bilateral 5/6/2021    Procedure: INJECTION, FACET JOINT--Bilateral L4-5, L5-S1;  Surgeon: Alireza Meraz Jr., MD;  Location: [facility] PAIN MGT;  Service: Pain Management;  Laterality: Bilateral;  Oral    INTERPOSITION ARTHROPLASTY OF CARPOMETACARPAL JOINTS Left 1/8/2021    Procedure: INTERPOSITION ARTHROPLASTY, CMC JOINT - left dequervains and cmc arthroplasty, arthrex;  Surgeon: Marky Hagen MD;  Location: [facility] OR;  Service: Orthopedics;  Laterality: Left;    JOINT REPLACEMENT Left     Pan Retinal Photocoagulation Bilateral     Dr. Monterroso (Proliferative Diabetic Retinopathy)    RADIOFREQUENCY ABLATION OF LUMBAR MEDIAL BRANCH NERVE AT SINGLE LEVEL Bilateral 12/20/2023    Procedure: Radiofrequency Ablation, Nerve, Spinal, Lumbar, bilateral L4/5 L5/S1;  Surgeon: Alejandrina Roa DO;  Location: [facility] PAIN MANAGEMENT;  Service: Pain Management;  Laterality: Bilateral;  diabetic  asa    RADIOFREQUENCY THERMOCOAGULATION Right 5/12/2022    Procedure: RADIOFREQUENCY THERMAL COAGULATION RIGHT L4-5, L5-S1 (IV Sedation);  Surgeon: Alireza Meraz Jr., MD;  Location: [facility] PAIN MGT;  Service: Pain Management;  Laterality: Right;  diabetic    RADIOFREQUENCY THERMOCOAGULATION Left 5/19/2022    Procedure: RADIOFREQUENCY THERMAL COAGULATION LEFT L4-5, L5-S1 (IV Sedation);  Surgeon: Alireza Meraz Jr., MD;  Location: [facility] PAIN MGT;  Service: Pain Management;  Laterality: Left;  diabetic    TOTAL ABDOMINAL HYSTERECTOMY  1982    TOTAL KNEE ARTHROPLASTY  2/2006    left    TRIGGER FINGER RELEASE  9/18/2009       Review of patient's allergies indicates:   Allergen Reactions    Clindamycin Hives and Swelling    Iodinated contrast media Rash       Current Facility-Administered Medications on File Prior to Encounter  "  Medication    triamcinolone acetonide injection 10 mg    triamcinolone acetonide injection 10 mg     Current Outpatient Medications on File Prior to Encounter   Medication Sig    albuterol (PROAIR HFA) 90 mcg/actuation inhaler Inhale 2 puffs into the lungs every 6 (six) hours as needed for Wheezing. Rescue    allopurinoL (ZYLOPRIM) 300 MG tablet     amLODIPine (NORVASC) 10 MG tablet TAKE 1 TABLET BY MOUTH ONCE  DAILY    aspirin 81 MG Chew Take 1 tablet (81 mg total) by mouth once daily.    atorvastatin (LIPITOR) 80 MG tablet Take 1 tablet (80 mg total) by mouth every evening.    BD ULTRA-FINE KIKA PEN NEEDLE 32 gauge x 5/32" Ndle USE 4 TIMES DAILY    blood sugar diagnostic Strp 1 strip 4 times daily. Contour Next.    blood-glucose meter kit 1 each by Other route once daily. Use daily. Insurance proffered one touch  E11.42    blood-glucose sensor (DEXCOM G7 SENSOR MISC) by Misc.(Non-Drug; Combo Route) route.    chlorthalidone (HYGROTEN) 25 MG Tab Take 1 tablet (25 mg total) by mouth once daily.    CONSTULOSE 10 gram/15 mL solution SMARTSI Milliliter(s) By Mouth Twice Daily PRN    DEXCOM G7  Misc Use as directed    DEXCOM G7 SENSOR Terese 1 Device by Misc.(Non-Drug; Combo Route) route every 10 days.    dulaglutide (TRULICITY) 4.5 mg/0.5 mL pen injector INJECT THE CONTENTS OF ONE PEN  SUBCUTANEOUSLY WEEKLY AS  DIRECTED    DULoxetine (CYMBALTA) 30 MG capsule Take 2 capsules (60 mg total) by mouth once daily.    glucagon (GVOKE HYPOPEN 1-PACK) 0.5 mg/0.1 mL AtIn Inject 1 Dose into the skin daily as needed (for severe hypoglycemia if you aren't able to treat by ingesting sugar).    insulin lispro (HUMALOG KWIKPEN INSULIN) 100 unit/mL pen INJECT 13 UNITS INTO THE  SKIN 3 TIMES DAILY BEFORE  MEALS PLUS SLIDING SCALE -  MAX TOTAL DAILY DOSE 70  UNITS    irbesartan (AVAPRO) 300 MG tablet Take 1 tablet (300 mg total) by mouth every evening.    JARDIANCE 10 mg tablet TAKE 1 TABLET BY MOUTH ONCE  DAILY    " ketoconazole (NIZORAL) 2 % cream Apply topically once daily.    ketoconazole (NIZORAL) 2 % shampoo Wash hair with medicated shampoo at least 2x/week - let sit on scalp at least 5 minutes prior to rinsing    labetaloL (NORMODYNE) 300 MG tablet Take 1 tablet (300 mg total) by mouth 2 (two) times daily.    lancets 31 gauge Misc 1 lancet by Misc.(Non-Drug; Combo Route) route 4 (four) times daily. E11.42 . Prescribed one touch    mometasone (ELOCON) 0.1 % ointment Apply topically once daily. Apply twice daily 1 week on, 1 week off for 3-6 months    multivitamin (THERAGRAN) per tablet Take 1 tablet by mouth once daily.    pregabalin (LYRICA) 150 MG capsule Take 1 capsule (150 mg total) by mouth 2 (two) times daily.    sodium bicarbonate 325 MG tablet Take 2 tablets (650 mg total) by mouth 2 (two) times daily.    TRESIBA FLEXTOUCH U-100 100 unit/mL (3 mL) insulin pen Inject 45 Units into the skin every evening.    [DISCONTINUED] insulin glargine, TOUJEO, (TOUJEO SOLOSTAR) 300 unit/mL (1.5 mL) InPn pen INJECT 40 UNITS INTO THE SKIN EVERY EVENING TITRATE UP AS DIRECTED.     Family History       Problem Relation (Age of Onset)    Diabetes Mother, Sister, Brother    Heart disease Father    Hypertension Mother, Brother    No Known Problems Sister, Brother, Daughter, Daughter, Son    Thyroid disease Brother          Tobacco Use    Smoking status: Never    Smokeless tobacco: Never   Substance and Sexual Activity    Alcohol use: No    Drug use: No    Sexual activity: Yes     Partners: Male     Review of Systems   Constitutional:  Negative for activity change, chills and fever.   HENT:  Negative for trouble swallowing.    Eyes:  Negative for photophobia and visual disturbance.   Respiratory:  Negative for cough, chest tightness and shortness of breath.    Cardiovascular:  Negative for chest pain, palpitations and leg swelling.   Gastrointestinal:  Positive for abdominal pain, diarrhea and nausea. Negative for constipation and  vomiting.   Genitourinary:  Negative for dysuria, frequency and hematuria.   Musculoskeletal:  Negative for back pain, gait problem and neck pain.   Skin:  Negative for rash and wound.   Neurological:  Negative for dizziness, syncope, speech difficulty and light-headedness.   Psychiatric/Behavioral:  Negative for agitation and confusion. The patient is not nervous/anxious.      Objective:     Vital Signs (Most Recent):  Temp: 98.8 °F (37.1 °C) (07/01/24 1141)  Pulse: 66 (07/01/24 1530)  Resp: 18 (07/01/24 1530)  BP: (!) 143/63 (07/01/24 1530)  SpO2: 96 % (07/01/24 1530) Vital Signs (24h Range):  Temp:  [98.8 °F (37.1 °C)] 98.8 °F (37.1 °C)  Pulse:  [62-68] 66  Resp:  [18-22] 18  SpO2:  [95 %-99 %] 96 %  BP: (116-157)/(55-70) 143/63     Weight: 130.6 kg (288 lb)  Body mass index is 47.93 kg/m².     Physical Exam  Vitals and nursing note reviewed.   Constitutional:       Appearance: She is well-developed.      Comments: Uncomfortable appearing     HENT:      Head: Normocephalic and atraumatic.      Mouth/Throat:      Mouth: Mucous membranes are dry.      Pharynx: No oropharyngeal exudate.   Eyes:      Conjunctiva/sclera: Conjunctivae normal.      Pupils: Pupils are equal, round, and reactive to light.   Cardiovascular:      Rate and Rhythm: Normal rate and regular rhythm.      Heart sounds: Normal heart sounds.   Pulmonary:      Effort: Pulmonary effort is normal. No respiratory distress.      Breath sounds: Normal breath sounds. No wheezing.   Abdominal:      General: Bowel sounds are normal. There is no distension.      Palpations: Abdomen is soft.      Tenderness: There is abdominal tenderness (diffuse tenderness, R>L).   Musculoskeletal:         General: No tenderness. Normal range of motion.      Cervical back: Normal range of motion and neck supple.   Lymphadenopathy:      Cervical: No cervical adenopathy.   Skin:     General: Skin is warm and dry.      Capillary Refill: Capillary refill takes less than 2  seconds.      Findings: No rash.   Neurological:      Mental Status: She is alert and oriented to person, place, and time.      Cranial Nerves: No cranial nerve deficit.      Sensory: No sensory deficit.      Coordination: Coordination normal.   Psychiatric:         Behavior: Behavior normal.         Thought Content: Thought content normal.         Judgment: Judgment normal.              CRANIAL NERVES     CN III, IV, VI   Pupils are equal, round, and reactive to light.       Significant Labs: All pertinent labs within the past 24 hours have been reviewed.  CBC:   Recent Labs   Lab 07/01/24  1231 07/01/24  1311   WBC 10.69  --    HGB 12.0  --    HCT 38.8 27*     --      CMP:   Recent Labs   Lab 07/01/24  1231      K 4.2      CO2 23   *   BUN 27*   CREATININE 2.2*   CALCIUM 9.8   PROT 6.6   ALBUMIN 3.5   BILITOT 0.4   ALKPHOS 114   AST 17   ALT 15   ANIONGAP 10       Significant Imaging: I have reviewed all pertinent imaging results/findings within the past 24 hours.  Assessment/Plan:     * Colitis  Diarrhea  - CT a/p subtle stranding about ascending colon near the hepatic flexure as above, could represent underlying colitis   - stool studies pending   - IVF as tolerated   - hold imodium until stool studies performed     Acute kidney injury superimposed on chronic kidney disease  Patient with acute kidney injury/acute renal failure likely due to pre-renal azotemia due to IVVD GEORGE is currently stable. Baseline creatinine  1.6  - Labs reviewed- Renal function/electrolytes with Estimated Creatinine Clearance: 30.6 mL/min (A) (based on SCr of 2.2 mg/dL (H)). according to latest data. Monitor urine output and serial BMP and adjust therapy as needed. Avoid nephrotoxins and renally dose meds for GFR listed above.  - holding losartan and chlorthalidone     Dyslipidemia associated with type 2 diabetes mellitus  - continue statin    Essential hypertension  Chronic, controlled. Latest blood pressure  "and vitals reviewed-     Temp:  [98.8 °F (37.1 °C)]   Pulse:  [62-68]   Resp:  [18-22]   BP: (116-157)/(55-70)   SpO2:  [95 %-99 %] .   Home meds for hypertension were reviewed and noted below.   Hypertension Medications               amLODIPine (NORVASC) 10 MG tablet TAKE 1 TABLET BY MOUTH ONCE  DAILY    chlorthalidone (HYGROTEN) 25 MG Tab Take 1 tablet (25 mg total) by mouth once daily.    irbesartan (AVAPRO) 300 MG tablet Take 1 tablet (300 mg total) by mouth every evening.    labetaloL (NORMODYNE) 300 MG tablet Take 1 tablet (300 mg total) by mouth 2 (two) times daily.            While in the hospital, will manage blood pressure as follows; Continue home antihypertensive regimen    Will utilize p.r.n. blood pressure medication only if patient's blood pressure greater than 180/110 and she develops symptoms such as worsening chest pain or shortness of breath.    Diastolic dysfunction  - no previous echo   - echo pending     History of stroke  - continue ASA and statin      RUFINA (obstructive sleep apnea)  - CPAP QHS       Type 2 diabetes mellitus with diabetic polyneuropathy, with long-term current use of insulin  Patient's FSGs are controlled on current medication regimen.  Last A1c reviewed-   Lab Results   Component Value Date    HGBA1C 7.0 (H) 03/05/2024     Most recent fingerstick glucose reviewed- No results for input(s): "POCTGLUCOSE" in the last 24 hours.  Current correctional scale  Low  Maintain anti-hyperglycemic dose as follows-   Antihyperglycemics (From admission, onward)      Start     Stop Route Frequency Ordered    07/01/24 1629  insulin aspart U-100 pen 0-5 Units         -- SubQ Before meals & nightly PRN 07/01/24 1529          Hold Oral hypoglycemics while patient is in the hospital.      VTE Risk Mitigation (From admission, onward)           Ordered     heparin (porcine) injection 5,000 Units  Every 8 hours         07/01/24 1529     IP VTE HIGH RISK PATIENT  Once         07/01/24 1529     Place " sequential compression device  Until discontinued         07/01/24 1529                         On 07/01/2024, patient should be placed in hospital observation services under my care in collaboration with Dr. Meir Walsh.           Irena Pantoja PA-C  Department of Hospital Medicine  [facility] - Emergency Dept

## 2024-07-02 LAB
ANION GAP SERPL CALC-SCNC: 11 MMOL/L (ref 8–16)
BUN SERPL-MCNC: 31 MG/DL (ref 8–23)
C DIFF GDH STL QL: NEGATIVE
C DIFF TOX A+B STL QL IA: NEGATIVE
CALCIUM SERPL-MCNC: 9.3 MG/DL (ref 8.7–10.5)
CHLORIDE SERPL-SCNC: 106 MMOL/L (ref 95–110)
CO2 SERPL-SCNC: 21 MMOL/L (ref 23–29)
CREAT SERPL-MCNC: 2.2 MG/DL (ref 0.5–1.4)
ERYTHROCYTE [DISTWIDTH] IN BLOOD BY AUTOMATED COUNT: 15.2 % (ref 11.5–14.5)
EST. GFR  (NO RACE VARIABLE): 23 ML/MIN/1.73 M^2
GLUCOSE SERPL-MCNC: 163 MG/DL (ref 70–110)
HCT VFR BLD AUTO: 36.5 % (ref 37–48.5)
HGB BLD-MCNC: 11.2 G/DL (ref 12–16)
MAGNESIUM SERPL-MCNC: 1.8 MG/DL (ref 1.6–2.6)
MCH RBC QN AUTO: 27.3 PG (ref 27–31)
MCHC RBC AUTO-ENTMCNC: 30.7 G/DL (ref 32–36)
MCV RBC AUTO: 89 FL (ref 82–98)
OSMOLALITY SERPL: 297 MOSM/KG (ref 275–295)
PHOSPHATE SERPL-MCNC: 4 MG/DL (ref 2.7–4.5)
PLATELET # BLD AUTO: 217 K/UL (ref 150–450)
PMV BLD AUTO: 11.1 FL (ref 9.2–12.9)
POCT GLUCOSE: 174 MG/DL (ref 70–110)
POCT GLUCOSE: 188 MG/DL (ref 70–110)
POCT GLUCOSE: 191 MG/DL (ref 70–110)
POCT GLUCOSE: 224 MG/DL (ref 70–110)
POTASSIUM SERPL-SCNC: 4 MMOL/L (ref 3.5–5.1)
RBC # BLD AUTO: 4.1 M/UL (ref 4–5.4)
SODIUM SERPL-SCNC: 138 MMOL/L (ref 136–145)
WBC # BLD AUTO: 7.43 K/UL (ref 3.9–12.7)
WBC #/AREA STL HPF: ABNORMAL /[HPF]

## 2024-07-02 PROCEDURE — 36415 COLL VENOUS BLD VENIPUNCTURE: CPT

## 2024-07-02 PROCEDURE — 63600175 PHARM REV CODE 636 W HCPCS

## 2024-07-02 PROCEDURE — 85027 COMPLETE CBC AUTOMATED: CPT

## 2024-07-02 PROCEDURE — 84100 ASSAY OF PHOSPHORUS: CPT

## 2024-07-02 PROCEDURE — 80048 BASIC METABOLIC PNL TOTAL CA: CPT

## 2024-07-02 PROCEDURE — 83930 ASSAY OF BLOOD OSMOLALITY: CPT

## 2024-07-02 PROCEDURE — 25000003 PHARM REV CODE 250

## 2024-07-02 PROCEDURE — 96365 THER/PROPH/DIAG IV INF INIT: CPT

## 2024-07-02 PROCEDURE — 94761 N-INVAS EAR/PLS OXIMETRY MLT: CPT

## 2024-07-02 PROCEDURE — 83735 ASSAY OF MAGNESIUM: CPT

## 2024-07-02 PROCEDURE — 11000001 HC ACUTE MED/SURG PRIVATE ROOM

## 2024-07-02 RX ORDER — CIPROFLOXACIN 2 MG/ML
400 INJECTION, SOLUTION INTRAVENOUS
Status: DISCONTINUED | OUTPATIENT
Start: 2024-07-02 | End: 2024-07-02

## 2024-07-02 RX ORDER — LOPERAMIDE HYDROCHLORIDE 2 MG/1
2 CAPSULE ORAL 3 TIMES DAILY
Status: DISCONTINUED | OUTPATIENT
Start: 2024-07-02 | End: 2024-07-04 | Stop reason: HOSPADM

## 2024-07-02 RX ORDER — METRONIDAZOLE 500 MG/1
500 TABLET ORAL EVERY 8 HOURS
Status: DISCONTINUED | OUTPATIENT
Start: 2024-07-02 | End: 2024-07-02

## 2024-07-02 RX ORDER — SODIUM CHLORIDE, SODIUM LACTATE, POTASSIUM CHLORIDE, CALCIUM CHLORIDE 600; 310; 30; 20 MG/100ML; MG/100ML; MG/100ML; MG/100ML
INJECTION, SOLUTION INTRAVENOUS CONTINUOUS
Status: DISCONTINUED | OUTPATIENT
Start: 2024-07-02 | End: 2024-07-03

## 2024-07-02 RX ADMIN — PREGABALIN 150 MG: 150 CAPSULE ORAL at 08:07

## 2024-07-02 RX ADMIN — SODIUM CHLORIDE, POTASSIUM CHLORIDE, SODIUM LACTATE AND CALCIUM CHLORIDE: 600; 310; 30; 20 INJECTION, SOLUTION INTRAVENOUS at 10:07

## 2024-07-02 RX ADMIN — ASPIRIN 81 MG CHEWABLE TABLET 81 MG: 81 TABLET CHEWABLE at 08:07

## 2024-07-02 RX ADMIN — LABETALOL HYDROCHLORIDE 300 MG: 100 TABLET, FILM COATED ORAL at 08:07

## 2024-07-02 RX ADMIN — LOPERAMIDE HYDROCHLORIDE 2 MG: 2 CAPSULE ORAL at 08:07

## 2024-07-02 RX ADMIN — METRONIDAZOLE 500 MG: 500 TABLET ORAL at 08:07

## 2024-07-02 RX ADMIN — CIPROFLOXACIN 400 MG: 2 INJECTION, SOLUTION INTRAVENOUS at 08:07

## 2024-07-02 RX ADMIN — PREGABALIN 150 MG: 150 CAPSULE ORAL at 09:07

## 2024-07-02 RX ADMIN — ATORVASTATIN CALCIUM 80 MG: 40 TABLET, FILM COATED ORAL at 09:07

## 2024-07-02 RX ADMIN — DULOXETINE HYDROCHLORIDE 60 MG: 30 CAPSULE, DELAYED RELEASE ORAL at 08:07

## 2024-07-02 RX ADMIN — HEPARIN SODIUM 5000 UNITS: 5000 INJECTION INTRAVENOUS; SUBCUTANEOUS at 06:07

## 2024-07-02 RX ADMIN — LOPERAMIDE HYDROCHLORIDE 2 MG: 2 CAPSULE ORAL at 09:07

## 2024-07-02 RX ADMIN — AMLODIPINE BESYLATE 10 MG: 10 TABLET ORAL at 08:07

## 2024-07-02 RX ADMIN — LOPERAMIDE HYDROCHLORIDE 2 MG: 2 CAPSULE ORAL at 02:07

## 2024-07-02 RX ADMIN — HEPARIN SODIUM 5000 UNITS: 5000 INJECTION INTRAVENOUS; SUBCUTANEOUS at 09:07

## 2024-07-02 RX ADMIN — HEPARIN SODIUM 5000 UNITS: 5000 INJECTION INTRAVENOUS; SUBCUTANEOUS at 02:07

## 2024-07-02 RX ADMIN — LABETALOL HYDROCHLORIDE 300 MG: 100 TABLET, FILM COATED ORAL at 09:07

## 2024-07-02 RX ADMIN — METRONIDAZOLE 500 MG: 500 TABLET ORAL at 02:07

## 2024-07-02 NOTE — ASSESSMENT & PLAN NOTE
"Patient's anemia is currently {Blank single:98298::"controlled","uncontrolled"}. {PRBC amount:20447::"Has not received any PRBCs to date"}. Etiology likely d/t {anemia causes:08074}  Current CBC reviewed-   Lab Results   Component Value Date    HGB 11.2 (L) 07/02/2024    HCT 36.5 (L) 07/02/2024     Monitor serial CBC and transfuse if patient becomes hemodynamically unstable, symptomatic or H/H drops below 7/21.  "

## 2024-07-02 NOTE — ASSESSMENT & PLAN NOTE
Diarrhea  - CT a/p subtle stranding about ascending colon near the hepatic flexure as above, could represent underlying colitis   - stool studies pending    - WBC with many neutrophils    - started on rocephin and flagyl   - IVF as tolerated   - c diff negative

## 2024-07-02 NOTE — PROGRESS NOTES
"   07/01/24 1826   Vital Signs   Temp 98.7 °F (37.1 °C)   Temp Source Oral   Pulse 70   Heart Rate Source Monitor   Resp 18   SpO2 96 %   BP (!) 163/72   MAP (mmHg) 103   Patient Position Sitting   Height and Weight   Height 5' 5" (1.651 m)   Weight 130.6 kg (287 lb 14.7 oz)   Weight Method Bed Scale   BSA (Calculated - sq m) 2.45 sq meters   BMI (Calculated) 47.9   Weight in (lb) to have BMI = 25 149.9   Assessments (Pre/Post)   Level of Consciousness (AVPU) alert     Pt AAOX4. No distress noted. Bed in lowest position. Side rails up x2. Call bell and personal belongs within reach. Safety precautions maintained. Pt free of falls or injuries. No further issues or concerns at this time. Plan of care continues.  "

## 2024-07-02 NOTE — PROGRESS NOTES
[facility] - [facility] [address] [place] [address] Medicine  Progress Note    Patient Name: Annika Mac  MRN: [id number]  Patient Class: IP- Inpatient   Admission Date: 7/1/2024  Length of Stay: 0 days  Attending Physician: Meir Walsh MD  Primary Care Provider: Mamie Cotton MD        Subjective:     Principal Problem:Colitis        HPI:  Annika Mac is a 73 y.o. female with GERD, peripheral artery disease, DM2, history of DVT, COPD presents for diarrhea and abdominal pain. Patient reports 4-5 days of right-sided abdominal cramping with associated diarrhea. She reports the diarrhea is watery with > 10 episodes of diarrhea a day. Has associated with nausea but no vomiting. Describes the abdominal pain as sharp. She does report a small spot of blood from a known hemorrhoid but denies any other blood in her stool. She denies any international travel, recent antibiotic use, recent hospitalizations, dysuria, fevers, chills.     ED: mildly hypertensive, otherwise VSS. CBC without leukocytosis, CMP with Cr 2.2 (baseline ~1.6). Covid negative. CT a/p with subtle stranding about ascending colon near the hepatic flexure as above, could represent underlying colitis. Given 1L, zofran and bentyl in the ED and admitted to hospital medicine.     Overview/Hospital Course:  Mrs. Roblero is a 73 year old female who was admitted to hospital medicine for evaluation of abdominal pain and excessive episodes of watery diarrhea. CT abdomen pelvis showed: Bowel kidneys are normal in size and location. Mild nonspecific bilateral perinephric stranding. Bilateral hypodensities compatible with cysts, largest on the right measuring 4.2 cm. No convincing renal calculi. No hydronephrosis or hydroureter. Subtle stranding about ascending colon near the hepatic flexure as above, could represent underlying colitis. C diff negative, WBC stool pos for many neutrophils, starred on IV cipro and po flagyl. Creatinine elevated 2.2 cw GEORGE. FeNa pending.  Echo ordered     Interval History: Pt seen at bedside this am by me. DEYA. She had 5 episodes of diarrhea over night. Still complaining of abdominal pain. C diff negative.  Stool WBC pos for neutrophils, started on IV cipro and po flagyl. Getting stool cultures today. Creatinine elevated, likely pre/intrarenal . Echo ordered. Will advance from NPO to clear liquid diet today.        Review of Systems   Constitutional:  Negative for fever.   Gastrointestinal:  Positive for abdominal distention, abdominal pain, diarrhea and nausea. Negative for vomiting.   Genitourinary:  Positive for flank pain. Negative for dysuria.   Neurological:  Positive for headaches.     Objective:     Vital Signs (Most Recent):  Temp: 97.8 °F (36.6 °C) (07/02/24 0809)  Pulse: 77 (07/02/24 0809)  Resp: 17 (07/02/24 0809)  BP: 129/60 (07/02/24 0809)  SpO2: 96 % (07/02/24 0809) Vital Signs (24h Range):  Temp:  [97.8 °F (36.6 °C)-98.8 °F (37.1 °C)] 97.8 °F (36.6 °C)  Pulse:  [62-77] 77  Resp:  [17-23] 17  SpO2:  [95 %-100 %] 96 %  BP: (116-163)/() 129/60     Weight: 130.6 kg (287 lb 14.7 oz)  Body mass index is 47.91 kg/m².    Intake/Output Summary (Last 24 hours) at 7/2/2024 1021  Last data filed at 7/1/2024 1657  Gross per 24 hour   Intake 1000 ml   Output --   Net 1000 ml         Physical Exam  Constitutional:       Appearance: She is obese.   HENT:      Head: Normocephalic and atraumatic.   Cardiovascular:      Rate and Rhythm: Normal rate and regular rhythm.      Heart sounds: Murmur heard.   Pulmonary:      Breath sounds: Normal breath sounds.   Abdominal:      General: There is distension.      Tenderness: There is abdominal tenderness. There is right CVA tenderness and left CVA tenderness. There is no rebound.   Neurological:      Mental Status: She is oriented to person, place, and time.   Psychiatric:         Mood and Affect: Mood normal.             Significant Labs: All pertinent labs within the past 24 hours have been  reviewed.  A1C:   Recent Labs   Lab 03/05/24  1032   HGBA1C 7.0*       Bilirubin:   Recent Labs   Lab 07/01/24  1231   BILITOT 0.4       BMP:   Recent Labs   Lab 07/02/24  0558   *      K 4.0      CO2 21*   BUN 31*   CREATININE 2.2*   CALCIUM 9.3   MG 1.8     CBC:   Recent Labs   Lab 07/01/24  1231 07/01/24  1311 07/02/24  0558   WBC 10.69  --  7.43   HGB 12.0  --  11.2*   HCT 38.8 27* 36.5*     --  217     CMP:   Recent Labs   Lab 07/01/24  1231 07/02/24  0558    138   K 4.2 4.0    106   CO2 23 21*   * 163*   BUN 27* 31*   CREATININE 2.2* 2.2*   CALCIUM 9.8 9.3   PROT 6.6  --    ALBUMIN 3.5  --    BILITOT 0.4  --    ALKPHOS 114  --    AST 17  --    ALT 15  --    ANIONGAP 10 11       Lipase:   Recent Labs   Lab 07/01/24  1231   LIPASE 13       Magnesium:   Recent Labs   Lab 07/02/24  0558   MG 1.8     Pathology Results  (Last 10 years)                 04/26/23 0916  Specimen to Pathology, Surgery Gastrointestinal tract Final result    Narrative:  Pre-op Diagnosis: Hx of colonic polyps [Z86.010]   Esophageal dysphagia [R13.19]   Procedure(s):   EGD (ESOPHAGOGASTRODUODENOSCOPY)   COLONOSCOPY   Number of specimens: 1   Name of specimens:   Jar 1: Colon polyps x 4   Which provider would you like to cc?->JOYCE ADAMES   Which provider would you like to cc?->SAMANTHA CLAROS   Release to patient->Immediate   Specimen total (fresh, frozen, permanent):->1       11/23/21 1410  Specimen to Pathology Other (bone biopsy) Final result    Narrative:  Number of Specimens: 1   Name of Specimens: left 2nd distal phalanx bone   Release to patient->Immediate             POCT Glucose:   Recent Labs   Lab 07/01/24  1612 07/01/24  2137 07/02/24  0920   POCTGLUCOSE 196* 148* 191*       Recent Lab Results  (Last 5 results in the past 24 hours)        07/02/24  0920   07/02/24  0558   07/01/24  2137   07/01/24  1941 07/01/24 1940        Anion Gap   11             BUN   31             C  difficile Toxins A+B, EIA         Negative  Comment: Testing not recommended for children <24 months old.       C. diff Antigen         Negative       Calcium   9.3             Chloride   106             CO2   21             Creatinine   2.2             eGFR   23.0             Glucose   163             Hematocrit   36.5             Hemoglobin   11.2             Magnesium    1.8             MCH   27.3             MCHC   30.7             MCV   89             MPV   11.1             Phosphorus Level   4.0             Platelet Count   217             POCT Glucose 191     148           Potassium   4.0             RBC   4.10             RDW   15.2             Sodium   138             Stool WBC       Many neutrophils seen         WBC   7.43                                    Significant Imaging:     Assessment/Plan:      * Colitis  Diarrhea  - CT a/p subtle stranding about ascending colon near the hepatic flexure as above, could represent underlying colitis   - stool studies pending    - WBC with many neutrophils    - started on rocephin and flagyl   - IVF as tolerated   - c diff negative    Acute kidney injury superimposed on chronic kidney disease  Patient with acute kidney injury/acute renal failure likely due to pre-renal azotemia due to IVVD GEORGE is currently stable. Baseline creatinine  1.6  - Labs reviewed- Renal function/electrolytes with Estimated Creatinine Clearance: 30.6 mL/min (A) (based on SCr of 2.2 mg/dL (H)). according to latest data. Monitor urine output and serial BMP and adjust therapy as needed. Avoid nephrotoxins and renally dose meds for GFR listed above.  - holding losartan and chlorthalidone   - urine lytes pending    Dyslipidemia associated with type 2 diabetes mellitus  - continue statin    Essential hypertension  Chronic, controlled. Latest blood pressure and vitals reviewed-     Temp:  [98.8 °F (37.1 °C)]   Pulse:  [62-68]   Resp:  [18-22]   BP: (116-157)/(55-70)   SpO2:  [95 %-99 %] .   Home  "meds for hypertension were reviewed and noted below.   Hypertension Medications               amLODIPine (NORVASC) 10 MG tablet TAKE 1 TABLET BY MOUTH ONCE  DAILY    chlorthalidone (HYGROTEN) 25 MG Tab Take 1 tablet (25 mg total) by mouth once daily.    irbesartan (AVAPRO) 300 MG tablet Take 1 tablet (300 mg total) by mouth every evening.    labetaloL (NORMODYNE) 300 MG tablet Take 1 tablet (300 mg total) by mouth 2 (two) times daily.            While in the hospital, will manage blood pressure as follows; Continue home antihypertensive regimen    Will utilize p.r.n. blood pressure medication only if patient's blood pressure greater than 180/110 and she develops symptoms such as worsening chest pain or shortness of breath.    Diastolic dysfunction  - no previous echo   - echo pending     History of stroke  - continue ASA and statin      RUFINA (obstructive sleep apnea)  - CPAP QHS       Type 2 diabetes mellitus with diabetic polyneuropathy, with long-term current use of insulin  Patient's FSGs are controlled on current medication regimen.  Last A1c reviewed-   Lab Results   Component Value Date    HGBA1C 7.0 (H) 03/05/2024     Most recent fingerstick glucose reviewed- No results for input(s): "POCTGLUCOSE" in the last 24 hours.  Current correctional scale  Low  Maintain anti-hyperglycemic dose as follows-   Antihyperglycemics (From admission, onward)      Start     Stop Route Frequency Ordered    07/01/24 1629  insulin aspart U-100 pen 0-5 Units         -- SubQ Before meals & nightly PRN 07/01/24 1529          Hold Oral hypoglycemics while patient is in the hospital.      VTE Risk Mitigation (From admission, onward)           Ordered     heparin (porcine) injection 5,000 Units  Every 8 hours         07/01/24 1529     IP VTE HIGH RISK PATIENT  Once         07/01/24 1529     Place sequential compression device  Until discontinued         07/01/24 1529                    Discharge Planning   MARK: 7/3/2024     Code Status: " Full Code   Is the patient medically ready for discharge?:     Reason for patient still in hospital (select all that apply): Patient trending condition, Laboratory test, and Treatment                     Irena Pantoja PA-C  Department of Hospital Medicine   [facility] 11[facility]

## 2024-07-02 NOTE — ASSESSMENT & PLAN NOTE
Patient with acute kidney injury/acute renal failure likely due to pre-renal azotemia due to IVVD GEORGE is currently stable. Baseline creatinine  1.6  - Labs reviewed- Renal function/electrolytes with Estimated Creatinine Clearance: 30.6 mL/min (A) (based on SCr of 2.2 mg/dL (H)). according to latest data. Monitor urine output and serial BMP and adjust therapy as needed. Avoid nephrotoxins and renally dose meds for GFR listed above.  - holding losartan and chlorthalidone   - urine lytes pending

## 2024-07-02 NOTE — PLAN OF CARE
[facility]  Initial Discharge Assessment       Primary Care Provider: Mamie Cotton MD    Admission Diagnosis: Dehydration [E86.0]  Colitis [K52.9]  Chest pain [R07.9]    Admission Date: 7/1/2024  Expected Discharge Date: 7/3/2024         Payor: [employer] [facility] / Plan: [employer] [facility] SNP / Product Type: Medicare Advantage /     Extended Emergency Contact Information  Primary Emergency Contact: Heber Mac  Address:  [address] [place] [address] [facility]  Home Phone: [phone]  Mobile Phone: [phone]  Relation: Spouse  Secondary Emergency Contact: KwakuMaria De Jesus   [place]  Mobile Phone: [phone]  Relation: Daughter    Discharge Plan A: (P) Home with family  Discharge Plan B: (P) Home with family      [facility] - [place], [place] - [address] [address]  [address] [address]  [facility] [address]  Phone: [phone] Fax: [phone]    [facility]) - [address]  [address] [address]  Phone: [phone] Fax: [phone]    [facility] - [place], [place] - [address]  [address] [place] [address] [address]  [facility] [address]  Phone: [phone] Fax: [phone]    [facility] - [place]ier[place] - [address] [facility]  [address] [facility]  [place] [address]  Phone: [phone] Fax: [phone]    [facility] [place] - [address]  [address]  [address]  [facility] [address]  Phone: [phone] Fax: [phone]    [facility] - [place], [place] - [address] [facility]  [address] [facility] [address]  Phone: [phone] Fax: [phone]    [id number] - [place], [place] - [address] [facility] [address]  [address] [facility] [address]  Phone: [phone] Fax: [phone]      Initial Assessment (most  recent)       Adult Discharge Assessment - 07/02/24 1251          Discharge Assessment    Assessment Type Discharge Planning Assessment (P)      Confirmed/corrected address, phone number and insurance Yes (P)      Confirmed Demographics Correct on Facesheet (P)      Source of Information patient (P)      Reason For Admission Colitis (P)      People in Home spouse (P)      Do you expect to return to your current living situation? Yes (P)      Prior to hospitilization cognitive status: Alert/Oriented (P)      Current cognitive status: Alert/Oriented (P)      Equipment Currently Used at Home cane, straight (P)      Readmission within 30 days? No (P)      Patient currently being followed by outpatient case management? No (P)      Do you take prescription medications? Yes (P)      Do you have prescription coverage? Yes (P)      Do you have any problems affording any of your prescribed medications? TBD (P)      Are you on dialysis? No (P)      Do you take coumadin? No (P)      Discharge Plan A Home with family (P)      Discharge Plan B Home with family (P)      DME Needed Upon Discharge  walker, rolling (P)                           ELIJAH completed Discharge Planning Assessment with patient via bedside. Discharge planning booklet given to patient/family and whiteboard updated with MARK and phone #. All questions answered.    Patient reported that she will have transportation upon discharge.     Patient reported that she live with her , and prior to hospitalization she was independent with her ADL's. Patient reported that she uses a cane. Patient reported that she is not on dialysis and does not go to a Coumadin clinic.      Patient lives in a two story home; however, reported that she does not have to climb the stairs.     Discharge Plan A and Plan B have been determined by review of patient's clinical status, future medical and therapeutic needs, and coverage/benefits for post-acute care in coordination with  multidisciplinary team members.      Patria Avila LMSW  [facility]  Ext. [phone]

## 2024-07-02 NOTE — HOSPITAL COURSE
Annika Mac is placed in  Inpatient for management of abdominal pain and diarrhea in setting of colitis. CTAP with: Bowel kidneys are normal in size and location. Mild nonspecific bilateral perinephric stranding. Bilateral hypodensities compatible with cysts, largest on the right measuring 4.2 cm. No convincing renal calculi. No hydronephrosis or hydroureter. Subtle stranding about ascending colon near the hepatic flexure as above, could represent underlying colitis. C diff negative, WBC stool pos for many neutrophils, started on IV cipro and po flagyl. Stool culture pos to Campylobacter species. Dc'd cipro flagyl and started on IV azithromycin. Creatinine elevated c/w EGORGE in CKD, now improving s/p IVF and trending towards baseline. Patient symptomatically improved with abdominal pain and diarrhea improving.     At discharge she will continue po azithromycin to complete 10 day antibiotic course and continue loperamide and bentyl. She will follow up with PCP. Return precautions provided. Patient medically ready for discharge. Plan of care discussed with patient, patient agreeable to plan, and all questions answered.      Physical Exam    General: NAD. Obese.  Neuro: Alert and oriented. No focal deficits.  HEENT: EOMI. No JVD appreciated.  CVS: RRR.  Respiratory: Normal respiratory effort.    Abdominal: NTND, soft to palpation.    Extremities: Pulses full, equal, and regular over all 4 extremities.

## 2024-07-02 NOTE — PLAN OF CARE
Problem: Adult Inpatient Plan of Care  Goal: Plan of Care Review  Outcome: Progressing  Goal: Patient-Specific Goal (Individualized)  Outcome: Progressing  Goal: Absence of Hospital-Acquired Illness or Injury  Outcome: Progressing  Goal: Optimal Comfort and Wellbeing  Outcome: Progressing  Goal: Readiness for Transition of Care  Outcome: Progressing     Problem: Bariatric Environmental Safety  Goal: Safety Maintained with Care  Outcome: Progressing     Problem: Infection  Goal: Absence of Infection Signs and Symptoms  Outcome: Progressing     Problem: Diabetes Comorbidity  Goal: Blood Glucose Level Within Targeted Range  Outcome: Progressing     Problem: Acute Kidney Injury/Impairment  Goal: Fluid and Electrolyte Balance  Outcome: Progressing  Goal: Improved Oral Intake  Outcome: Progressing  Goal: Effective Renal Function  Outcome: Progressing   Pt progressing toward goals. No distress noted. No falls or injuries during shift. Pt bed in lowest position. Side rails x2. Call bell and personal belongs within reach. Safety precautions maintained.

## 2024-07-02 NOTE — PLAN OF CARE
Inpatient Upgrade Note    Annika Mac has warranted treatment spanning two or more midnights of hospital level care for the management of  Colitis . She continues to require IV antibiotics, IV fluids, daily labs, monitoring of vital signs, and advancing diet. Her condition is also complicated by the following comorbidities:  73 y.o. female with GERD, peripheral artery disease, DM2, history of DVT, COPD presents for diarrhea and abdominal pain.  .

## 2024-07-02 NOTE — ASSESSMENT & PLAN NOTE
Patient with acute kidney injury/acute renal failure likely due to pre-renal azotemia due to IVVD GEORGE is currently stable. Baseline creatinine  1.6  - Labs reviewed- Renal function/electrolytes with Estimated Creatinine Clearance: 30.6 mL/min (A) (based on SCr of 2.2 mg/dL (H)). according to latest data. Monitor urine output and serial BMP and adjust therapy as needed. Avoid nephrotoxins and renally dose meds for GFR listed above.  - holding losartan and chlorthalidone   -creatinine improving with IVF, will continue to trend.

## 2024-07-02 NOTE — NURSING
Nurses Note -- 4 Eyes      7/1/2024   7:05 PM      Skin assessed during: Admit      [x] No Altered Skin Integrity Present    []Prevention Measures Documented      [] Yes- Altered Skin Integrity Present or Discovered   [] LDA Added if Not in Epic (Describe Wound)   [] New Altered Skin Integrity was Present on Admit and Documented in LDA   [] Wound Image Taken    Wound Care Consulted? No    Attending Nurse:  Cuong Boston RN/Staff Member:   Mounika

## 2024-07-02 NOTE — SUBJECTIVE & OBJECTIVE
Interval History: Pt seen at bedside this am by me. DEYA. She had 5 episodes of diarrhea over night. Still complaining of abdominal pain. C diff negative.  Stool WBC pos for neutrophils, started on IV cipro and po flagyl. Getting stool cultures today. Creatinine elevated, likely pre/intrarenal . Echo ordered. Will advance from NPO to clear liquid diet today.        Review of Systems   Constitutional:  Negative for fever.   Gastrointestinal:  Positive for abdominal distention, abdominal pain, diarrhea and nausea. Negative for vomiting.   Genitourinary:  Positive for flank pain. Negative for dysuria.   Neurological:  Positive for headaches.     Objective:     Vital Signs (Most Recent):  Temp: 97.8 °F (36.6 °C) (07/02/24 0809)  Pulse: 77 (07/02/24 0809)  Resp: 17 (07/02/24 0809)  BP: 129/60 (07/02/24 0809)  SpO2: 96 % (07/02/24 0809) Vital Signs (24h Range):  Temp:  [97.8 °F (36.6 °C)-98.8 °F (37.1 °C)] 97.8 °F (36.6 °C)  Pulse:  [62-77] 77  Resp:  [17-23] 17  SpO2:  [95 %-100 %] 96 %  BP: (116-163)/() 129/60     Weight: 130.6 kg (287 lb 14.7 oz)  Body mass index is 47.91 kg/m².    Intake/Output Summary (Last 24 hours) at 7/2/2024 1021  Last data filed at 7/1/2024 1657  Gross per 24 hour   Intake 1000 ml   Output --   Net 1000 ml         Physical Exam  Constitutional:       Appearance: She is obese.   HENT:      Head: Normocephalic and atraumatic.   Cardiovascular:      Rate and Rhythm: Normal rate and regular rhythm.      Heart sounds: Murmur heard.   Pulmonary:      Breath sounds: Normal breath sounds.   Abdominal:      General: There is distension.      Tenderness: There is abdominal tenderness. There is right CVA tenderness and left CVA tenderness. There is no rebound.   Neurological:      Mental Status: She is oriented to person, place, and time.   Psychiatric:         Mood and Affect: Mood normal.             Significant Labs: All pertinent labs within the past 24 hours have been reviewed.  A1C:   Recent  Labs   Lab 03/05/24  1032   HGBA1C 7.0*       Bilirubin:   Recent Labs   Lab 07/01/24  1231   BILITOT 0.4       BMP:   Recent Labs   Lab 07/02/24  0558   *      K 4.0      CO2 21*   BUN 31*   CREATININE 2.2*   CALCIUM 9.3   MG 1.8     CBC:   Recent Labs   Lab 07/01/24  1231 07/01/24  1311 07/02/24  0558   WBC 10.69  --  7.43   HGB 12.0  --  11.2*   HCT 38.8 27* 36.5*     --  217     CMP:   Recent Labs   Lab 07/01/24  1231 07/02/24  0558    138   K 4.2 4.0    106   CO2 23 21*   * 163*   BUN 27* 31*   CREATININE 2.2* 2.2*   CALCIUM 9.8 9.3   PROT 6.6  --    ALBUMIN 3.5  --    BILITOT 0.4  --    ALKPHOS 114  --    AST 17  --    ALT 15  --    ANIONGAP 10 11       Lipase:   Recent Labs   Lab 07/01/24  1231   LIPASE 13       Magnesium:   Recent Labs   Lab 07/02/24  0558   MG 1.8     Pathology Results  (Last 10 years)                 04/26/23 0916  Specimen to Pathology, Surgery Gastrointestinal tract Final result    Narrative:  Pre-op Diagnosis: Hx of colonic polyps [Z86.010]   Esophageal dysphagia [R13.19]   Procedure(s):   EGD (ESOPHAGOGASTRODUODENOSCOPY)   COLONOSCOPY   Number of specimens: 1   Name of specimens:   Jar 1: Colon polyps x 4   Which provider would you like to cc?->JOYCE ADAMES   Which provider would you like to cc?->SAMANTHA CLAROS   Release to patient->Immediate   Specimen total (fresh, frozen, permanent):->1       11/23/21 1410  Specimen to Pathology Other (bone biopsy) Final result    Narrative:  Number of Specimens: 1   Name of Specimens: left 2nd distal phalanx bone   Release to patient->Immediate             POCT Glucose:   Recent Labs   Lab 07/01/24  1612 07/01/24 2137 07/02/24  0920   POCTGLUCOSE 196* 148* 191*       Recent Lab Results  (Last 5 results in the past 24 hours)        07/02/24  0920   07/02/24  0558   07/01/24 2137   07/01/24 1941 07/01/24 1940        Anion Gap   11             BUN   31             C difficile Toxins A+B, EIA          Negative  Comment: Testing not recommended for children <24 months old.       C. diff Antigen         Negative       Calcium   9.3             Chloride   106             CO2   21             Creatinine   2.2             eGFR   23.0             Glucose   163             Hematocrit   36.5             Hemoglobin   11.2             Magnesium    1.8             MCH   27.3             MCHC   30.7             MCV   89             MPV   11.1             Phosphorus Level   4.0             Platelet Count   217             POCT Glucose 191     148           Potassium   4.0             RBC   4.10             RDW   15.2             Sodium   138             Stool WBC       Many neutrophils seen         WBC   7.43                                    Significant Imaging:

## 2024-07-02 NOTE — PLAN OF CARE
Problem: Adult Inpatient Plan of Care  Goal: Plan of Care Review  Outcome: Progressing  Flowsheets (Taken 7/2/2024 4780)  Plan of Care Reviewed With: patient  Goal: Patient-Specific Goal (Individualized)  Outcome: Progressing  Goal: Absence of Hospital-Acquired Illness or Injury  Outcome: Progressing  Goal: Optimal Comfort and Wellbeing  Outcome: Progressing  Goal: Readiness for Transition of Care  Outcome: Progressing     Problem: Bariatric Environmental Safety  Goal: Safety Maintained with Care  Outcome: Progressing     Problem: Infection  Goal: Absence of Infection Signs and Symptoms  Outcome: Progressing     Problem: Diabetes Comorbidity  Goal: Blood Glucose Level Within Targeted Range  Outcome: Progressing     Problem: Acute Kidney Injury/Impairment  Goal: Fluid and Electrolyte Balance  Outcome: Progressing  Goal: Improved Oral Intake  Outcome: Progressing  Goal: Effective Renal Function  Outcome: Progressing

## 2024-07-03 LAB
ANION GAP SERPL CALC-SCNC: 9 MMOL/L (ref 8–16)
BUN SERPL-MCNC: 28 MG/DL (ref 8–23)
CALCIUM SERPL-MCNC: 9.1 MG/DL (ref 8.7–10.5)
CHLORIDE SERPL-SCNC: 107 MMOL/L (ref 95–110)
CO2 SERPL-SCNC: 21 MMOL/L (ref 23–29)
CREAT SERPL-MCNC: 2 MG/DL (ref 0.5–1.4)
E COLI SXT1 STL QL IA: NEGATIVE
E COLI SXT2 STL QL IA: NEGATIVE
ERYTHROCYTE [DISTWIDTH] IN BLOOD BY AUTOMATED COUNT: 15.2 % (ref 11.5–14.5)
EST. GFR  (NO RACE VARIABLE): 25.7 ML/MIN/1.73 M^2
GLUCOSE SERPL-MCNC: 215 MG/DL (ref 70–110)
HCT VFR BLD AUTO: 33.9 % (ref 37–48.5)
HGB BLD-MCNC: 10.7 G/DL (ref 12–16)
MAGNESIUM SERPL-MCNC: 1.8 MG/DL (ref 1.6–2.6)
MCH RBC QN AUTO: 27.6 PG (ref 27–31)
MCHC RBC AUTO-ENTMCNC: 31.6 G/DL (ref 32–36)
MCV RBC AUTO: 88 FL (ref 82–98)
PHOSPHATE SERPL-MCNC: 3.4 MG/DL (ref 2.7–4.5)
PLATELET # BLD AUTO: 224 K/UL (ref 150–450)
PMV BLD AUTO: 11 FL (ref 9.2–12.9)
POCT GLUCOSE: 184 MG/DL (ref 70–110)
POCT GLUCOSE: 191 MG/DL (ref 70–110)
POCT GLUCOSE: 212 MG/DL (ref 70–110)
POCT GLUCOSE: 214 MG/DL (ref 70–110)
POCT GLUCOSE: 254 MG/DL (ref 70–110)
POTASSIUM SERPL-SCNC: 3.8 MMOL/L (ref 3.5–5.1)
RBC # BLD AUTO: 3.87 M/UL (ref 4–5.4)
SODIUM SERPL-SCNC: 137 MMOL/L (ref 136–145)
WBC # BLD AUTO: 6.19 K/UL (ref 3.9–12.7)

## 2024-07-03 PROCEDURE — 36415 COLL VENOUS BLD VENIPUNCTURE: CPT

## 2024-07-03 PROCEDURE — 63600175 PHARM REV CODE 636 W HCPCS

## 2024-07-03 PROCEDURE — 83735 ASSAY OF MAGNESIUM: CPT

## 2024-07-03 PROCEDURE — 11000001 HC ACUTE MED/SURG PRIVATE ROOM

## 2024-07-03 PROCEDURE — 25000003 PHARM REV CODE 250

## 2024-07-03 PROCEDURE — 85027 COMPLETE CBC AUTOMATED: CPT

## 2024-07-03 PROCEDURE — 84100 ASSAY OF PHOSPHORUS: CPT

## 2024-07-03 PROCEDURE — 80048 BASIC METABOLIC PNL TOTAL CA: CPT

## 2024-07-03 RX ORDER — SODIUM CHLORIDE, SODIUM LACTATE, POTASSIUM CHLORIDE, CALCIUM CHLORIDE 600; 310; 30; 20 MG/100ML; MG/100ML; MG/100ML; MG/100ML
INJECTION, SOLUTION INTRAVENOUS CONTINUOUS
Status: ACTIVE | OUTPATIENT
Start: 2024-07-03 | End: 2024-07-04

## 2024-07-03 RX ADMIN — LOPERAMIDE HYDROCHLORIDE 2 MG: 2 CAPSULE ORAL at 09:07

## 2024-07-03 RX ADMIN — HEPARIN SODIUM 5000 UNITS: 5000 INJECTION INTRAVENOUS; SUBCUTANEOUS at 02:07

## 2024-07-03 RX ADMIN — ASPIRIN 81 MG CHEWABLE TABLET 81 MG: 81 TABLET CHEWABLE at 08:07

## 2024-07-03 RX ADMIN — LABETALOL HYDROCHLORIDE 300 MG: 100 TABLET, FILM COATED ORAL at 09:07

## 2024-07-03 RX ADMIN — AMLODIPINE BESYLATE 10 MG: 10 TABLET ORAL at 08:07

## 2024-07-03 RX ADMIN — SODIUM CHLORIDE, POTASSIUM CHLORIDE, SODIUM LACTATE AND CALCIUM CHLORIDE: 600; 310; 30; 20 INJECTION, SOLUTION INTRAVENOUS at 11:07

## 2024-07-03 RX ADMIN — AZITHROMYCIN MONOHYDRATE 500 MG: 500 INJECTION, POWDER, LYOPHILIZED, FOR SOLUTION INTRAVENOUS at 08:07

## 2024-07-03 RX ADMIN — HEPARIN SODIUM 5000 UNITS: 5000 INJECTION INTRAVENOUS; SUBCUTANEOUS at 05:07

## 2024-07-03 RX ADMIN — DULOXETINE HYDROCHLORIDE 60 MG: 30 CAPSULE, DELAYED RELEASE ORAL at 08:07

## 2024-07-03 RX ADMIN — PREGABALIN 150 MG: 150 CAPSULE ORAL at 08:07

## 2024-07-03 RX ADMIN — LABETALOL HYDROCHLORIDE 300 MG: 100 TABLET, FILM COATED ORAL at 08:07

## 2024-07-03 RX ADMIN — HEPARIN SODIUM 5000 UNITS: 5000 INJECTION INTRAVENOUS; SUBCUTANEOUS at 09:07

## 2024-07-03 RX ADMIN — PREGABALIN 150 MG: 150 CAPSULE ORAL at 09:07

## 2024-07-03 RX ADMIN — LOPERAMIDE HYDROCHLORIDE 2 MG: 2 CAPSULE ORAL at 08:07

## 2024-07-03 RX ADMIN — LOPERAMIDE HYDROCHLORIDE 2 MG: 2 CAPSULE ORAL at 03:07

## 2024-07-03 RX ADMIN — ATORVASTATIN CALCIUM 80 MG: 40 TABLET, FILM COATED ORAL at 09:07

## 2024-07-03 RX ADMIN — INSULIN ASPART 2 UNITS: 100 INJECTION, SOLUTION INTRAVENOUS; SUBCUTANEOUS at 08:07

## 2024-07-03 RX ADMIN — INSULIN ASPART 1 UNITS: 100 INJECTION, SOLUTION INTRAVENOUS; SUBCUTANEOUS at 11:07

## 2024-07-03 NOTE — NURSING
Pt updated on the plan of care. Pt verbalized understanding.  Pt sitting in chair with wheels locked. Call bell within reach. Plan of care continues.

## 2024-07-03 NOTE — ASSESSMENT & PLAN NOTE
Diarrhea  - CT a/p subtle stranding about ascending colon near the hepatic flexure as above, could represent underlying colitis   - stool studies pending    - WBC with many neutrophils    - started on rocephin and flagyl   - IVF as tolerated   - c diff negative  -stool culture pos for campylobacter species   -started on IV azithromycin

## 2024-07-03 NOTE — ASSESSMENT & PLAN NOTE
Patient's anemia is currently controlled. Has not received any PRBCs to date. Etiology likely d/t chronic disease due to Chronic Kidney Disease  Current CBC reviewed-   Lab Results   Component Value Date    HGB 10.7 (L) 07/03/2024    HCT 33.9 (L) 07/03/2024     Monitor serial CBC and transfuse if patient becomes hemodynamically unstable, symptomatic or H/H drops below 7/21.

## 2024-07-03 NOTE — PROGRESS NOTES
[facility] [address] [place] [address] Medicine  Progress Note    Patient Name: Annika Mac  MRN: [id number]  Patient Class: IP- Inpatient   Admission Date: 7/1/2024  Length of Stay: 1 days  Attending Physician: Meir Walsh MD  Primary Care Provider: Mamie Cotton MD        Subjective:     Principal Problem:Colitis        HPI:  Annika Mac is a 73 y.o. female with GERD, peripheral artery disease, DM2, history of DVT, COPD presents for diarrhea and abdominal pain. Patient reports 4-5 days of right-sided abdominal cramping with associated diarrhea. She reports the diarrhea is watery with > 10 episodes of diarrhea a day. Has associated with nausea but no vomiting. Describes the abdominal pain as sharp. She does report a small spot of blood from a known hemorrhoid but denies any other blood in her stool. She denies any international travel, recent antibiotic use, recent hospitalizations, dysuria, fevers, chills.     ED: mildly hypertensive, otherwise VSS. CBC without leukocytosis, CMP with Cr 2.2 (baseline ~1.6). Covid negative. CT a/p with subtle stranding about ascending colon near the hepatic flexure as above, could represent underlying colitis. Given 1L, zofran and bentyl in the ED and admitted to hospital medicine.     Overview/Hospital Course:  Mrs. Roblero is a 73 year old female who was admitted to hospital medicine for evaluation of abdominal pain and excessive episodes of watery diarrhea. CT abdomen pelvis showed: Bowel kidneys are normal in size and location. Mild nonspecific bilateral perinephric stranding. Bilateral hypodensities compatible with cysts, largest on the right measuring 4.2 cm. No convincing renal calculi. No hydronephrosis or hydroureter. Subtle stranding about ascending colon near the hepatic flexure as above, could represent underlying colitis. C diff negative, WBC stool pos for many neutrophils, starred on IV cipro and po flagyl. Stool culture pos to Campylobacter species. Onur'd  cipro flagyl and started on IV azithromycin. Creatinine elevated 2.2 cw GEORGE- improving with IVF, will continue to trend.    Interval History: Pt seen at bedside this am by me. DEYA. There were no acute events over night. Diarrhea episodes have decreased in frequency. Pt still complaining of abdominal pain but otherwise doing well. Stool cultures positive for campylobacter species. Dc'd cipro flagyl and started on IV azithromycin. Creatinine improving with fluids, will continue to trend.     Review of Systems   Constitutional:  Negative for fever.   Respiratory:  Negative for shortness of breath.    Cardiovascular:  Negative for chest pain.   Gastrointestinal:  Positive for abdominal distention, abdominal pain and diarrhea. Negative for nausea and vomiting.   Neurological:  Positive for headaches.     Objective:     Vital Signs (Most Recent):  Temp: 98 °F (36.7 °C) (07/03/24 1202)  Pulse: 61 (07/03/24 1202)  Resp: 17 (07/03/24 1202)  BP: 138/63 (07/03/24 1202)  SpO2: 97 % (07/03/24 1202) Vital Signs (24h Range):  Temp:  [97.7 °F (36.5 °C)-98.4 °F (36.9 °C)] 98 °F (36.7 °C)  Pulse:  [61-71] 61  Resp:  [17-18] 17  SpO2:  [95 %-98 %] 97 %  BP: (111-163)/(62-72) 138/63     Weight: 130.6 kg (287 lb 14.7 oz)  Body mass index is 47.91 kg/m².  No intake or output data in the 24 hours ending 07/03/24 1245      Physical Exam  HENT:      Head: Normocephalic and atraumatic.   Eyes:      Pupils: Pupils are equal, round, and reactive to light.   Cardiovascular:      Rate and Rhythm: Normal rate.      Heart sounds: Murmur heard.   Pulmonary:      Breath sounds: Normal breath sounds.   Abdominal:      General: There is distension.      Tenderness: There is abdominal tenderness. There is left CVA tenderness.   Neurological:      Mental Status: She is alert and oriented to person, place, and time.             Significant Labs: All pertinent labs within the past 24 hours have been reviewed.  Recent Lab Results  (Last 5 results in the  past 24 hours)        07/03/24  1159   07/03/24  0818   07/03/24  0532   07/02/24  2055   07/02/24  1745        Anion Gap     9           BUN     28           Calcium     9.1           Chloride     107           CO2     21           Creatinine     2.0           eGFR     25.7           Glucose     215           Hematocrit     33.9           Hemoglobin     10.7           Magnesium      1.8           MCH     27.6           MCHC     31.6           MCV     88           MPV     11.0           Phosphorus Level     3.4           Platelet Count     224           POCT Glucose 184   214     224   174       Potassium     3.8           RBC     3.87           RDW     15.2           Sodium     137           WBC     6.19                                    Assessment/Plan:      * Colitis  Diarrhea  - CT a/p subtle stranding about ascending colon near the hepatic flexure as above, could represent underlying colitis   - stool studies pending    - WBC with many neutrophils    - started on rocephin and flagyl   - IVF as tolerated   - c diff negative  -stool culture pos for campylobacter species   -started on IV azithromycin       Acute kidney injury superimposed on chronic kidney disease  Patient with acute kidney injury/acute renal failure likely due to pre-renal azotemia due to IVVD GEORGE is currently stable. Baseline creatinine  1.6  - Labs reviewed- Renal function/electrolytes with Estimated Creatinine Clearance: 30.6 mL/min (A) (based on SCr of 2.2 mg/dL (H)). according to latest data. Monitor urine output and serial BMP and adjust therapy as needed. Avoid nephrotoxins and renally dose meds for GFR listed above.  - holding losartan and chlorthalidone   -creatinine improving with IVF, will continue to trend.     Diarrhea  -continuing to improve with abx       Dyslipidemia associated with type 2 diabetes mellitus  - continue statin    Anemia of renal disease  Patient's anemia is currently controlled. Has not received any PRBCs to date.  "Etiology likely d/t chronic disease due to Chronic Kidney Disease  Current CBC reviewed-   Lab Results   Component Value Date    HGB 10.7 (L) 07/03/2024    HCT 33.9 (L) 07/03/2024     Monitor serial CBC and transfuse if patient becomes hemodynamically unstable, symptomatic or H/H drops below 7/21.    Essential hypertension  Chronic, controlled. Latest blood pressure and vitals reviewed-     Temp:  [98.8 °F (37.1 °C)]   Pulse:  [62-68]   Resp:  [18-22]   BP: (116-157)/(55-70)   SpO2:  [95 %-99 %] .   Home meds for hypertension were reviewed and noted below.   Hypertension Medications               amLODIPine (NORVASC) 10 MG tablet TAKE 1 TABLET BY MOUTH ONCE  DAILY    chlorthalidone (HYGROTEN) 25 MG Tab Take 1 tablet (25 mg total) by mouth once daily.    irbesartan (AVAPRO) 300 MG tablet Take 1 tablet (300 mg total) by mouth every evening.    labetaloL (NORMODYNE) 300 MG tablet Take 1 tablet (300 mg total) by mouth 2 (two) times daily.            While in the hospital, will manage blood pressure as follows; Continue home antihypertensive regimen    Will utilize p.r.n. blood pressure medication only if patient's blood pressure greater than 180/110 and she develops symptoms such as worsening chest pain or shortness of breath.    Diastolic dysfunction  - no previous echo     History of stroke  - continue ASA and statin      RUFINA (obstructive sleep apnea)  - CPAP QHS       Type 2 diabetes mellitus with diabetic polyneuropathy, with long-term current use of insulin  Patient's FSGs are controlled on current medication regimen.  Last A1c reviewed-   Lab Results   Component Value Date    HGBA1C 7.0 (H) 03/05/2024     Most recent fingerstick glucose reviewed- No results for input(s): "POCTGLUCOSE" in the last 24 hours.  Current correctional scale  Low  Maintain anti-hyperglycemic dose as follows-   Antihyperglycemics (From admission, onward)      Start     Stop Route Frequency Ordered    07/01/24 9349  insulin aspart U-100 pen " 0-5 Units         -- SubQ Before meals & nightly PRN 07/01/24 1529          Hold Oral hypoglycemics while patient is in the hospital.      VTE Risk Mitigation (From admission, onward)           Ordered     heparin (porcine) injection 5,000 Units  Every 8 hours         07/01/24 1529     IP VTE HIGH RISK PATIENT  Once         07/01/24 1529     Place sequential compression device  Until discontinued         07/01/24 1529                    Discharge Planning   MARK: 7/4/2024     Code Status: Full Code   Is the patient medically ready for discharge?:     Reason for patient still in hospital (select all that apply): Patient trending condition, Laboratory test, and Treatment  Discharge Plan A: Home with family                  Irena Pantoja PA-C  Department of Hospital Medicine   [facility]

## 2024-07-03 NOTE — NURSING
Pt awake and alert. LR infusing at 75ml/hr.  No distress noted. Bed in lowest position. Side rails up x2. Call bell and personal belongs within reach. Safety precautions maintained. Pt free of falls or injuries. No further issues or concerns at this time. Plan of care continues.

## 2024-07-03 NOTE — PLAN OF CARE
Problem: Adult Inpatient Plan of Care  Goal: Plan of Care Review  Outcome: Progressing  Goal: Patient-Specific Goal (Individualized)  Outcome: Progressing  Goal: Absence of Hospital-Acquired Illness or Injury  Outcome: Progressing  Goal: Optimal Comfort and Wellbeing  Outcome: Progressing     Problem: Infection  Goal: Absence of Infection Signs and Symptoms  Outcome: Progressing     Problem: Diabetes Comorbidity  Goal: Blood Glucose Level Within Targeted Range  Outcome: Progressing     Problem: Acute Kidney Injury/Impairment  Goal: Fluid and Electrolyte Balance  Outcome: Progressing  Goal: Improved Oral Intake  Outcome: Progressing  Goal: Effective Renal Function  Outcome: Progressing     Problem: Obstructive Sleep Apnea Risk or Actual  Goal: Unobstructed Breathing During Sleep  Outcome: Progressing     Problem: Heart Failure  Goal: Optimal Coping  Outcome: Progressing  Goal: Optimal Cardiac Output  Reactivated  Goal: Stable Heart Rate and Rhythm  Reactivated  Goal: Optimal Functional Ability  Reactivated  Goal: Fluid and Electrolyte Balance  Reactivated  Goal: Improved Oral Intake  Reactivated  Goal: Effective Oxygenation and Ventilation  Reactivated  Goal: Effective Breathing Pattern During Sleep  Reactivated     Problem: Comorbidity Management  Goal: Blood Pressure in Desired Range  Reactivated     Problem: Chronic Kidney Disease  Goal: Optimal Coping with Chronic Illness  Reactivated     Problem: Diarrhea  Goal: Effective Diarrhea Management  Reactivated

## 2024-07-03 NOTE — SUBJECTIVE & OBJECTIVE
Interval History: Pt seen at bedside this am by me. DEYA. There were no acute events over night. Diarrhea episodes have decreased in frequency. Pt still complaining of abdominal pain but otherwise doing well. Stool cultures positive for campylobacter species. Dc'd cipro flagyl and started on IV azithromycin. Creatinine improving with fluids, will continue to trend.     Review of Systems   Constitutional:  Negative for fever.   Respiratory:  Negative for shortness of breath.    Cardiovascular:  Negative for chest pain.   Gastrointestinal:  Positive for abdominal distention, abdominal pain and diarrhea. Negative for nausea and vomiting.   Neurological:  Positive for headaches.     Objective:     Vital Signs (Most Recent):  Temp: 98 °F (36.7 °C) (07/03/24 1202)  Pulse: 61 (07/03/24 1202)  Resp: 17 (07/03/24 1202)  BP: 138/63 (07/03/24 1202)  SpO2: 97 % (07/03/24 1202) Vital Signs (24h Range):  Temp:  [97.7 °F (36.5 °C)-98.4 °F (36.9 °C)] 98 °F (36.7 °C)  Pulse:  [61-71] 61  Resp:  [17-18] 17  SpO2:  [95 %-98 %] 97 %  BP: (111-163)/(62-72) 138/63     Weight: 130.6 kg (287 lb 14.7 oz)  Body mass index is 47.91 kg/m².  No intake or output data in the 24 hours ending 07/03/24 1245      Physical Exam  HENT:      Head: Normocephalic and atraumatic.   Eyes:      Pupils: Pupils are equal, round, and reactive to light.   Cardiovascular:      Rate and Rhythm: Normal rate.      Heart sounds: Murmur heard.   Pulmonary:      Breath sounds: Normal breath sounds.   Abdominal:      General: There is distension.      Tenderness: There is abdominal tenderness. There is left CVA tenderness.   Neurological:      Mental Status: She is alert and oriented to person, place, and time.             Significant Labs: All pertinent labs within the past 24 hours have been reviewed.  Recent Lab Results  (Last 5 results in the past 24 hours)        07/03/24  1159   07/03/24  0818   07/03/24  0532   07/02/24  2055   07/02/24  1745        Anion Gap      9           BUN     28           Calcium     9.1           Chloride     107           CO2     21           Creatinine     2.0           eGFR     25.7           Glucose     215           Hematocrit     33.9           Hemoglobin     10.7           Magnesium      1.8           MCH     27.6           MCHC     31.6           MCV     88           MPV     11.0           Phosphorus Level     3.4           Platelet Count     224           POCT Glucose 184   214     224   174       Potassium     3.8           RBC     3.87           RDW     15.2           Sodium     137           WBC     6.19

## 2024-07-04 VITALS
BODY MASS INDEX: 47.97 KG/M2 | OXYGEN SATURATION: 97 % | HEIGHT: 65 IN | TEMPERATURE: 98 F | SYSTOLIC BLOOD PRESSURE: 143 MMHG | WEIGHT: 287.94 LBS | DIASTOLIC BLOOD PRESSURE: 67 MMHG | HEART RATE: 67 BPM | RESPIRATION RATE: 17 BRPM

## 2024-07-04 LAB
ANION GAP SERPL CALC-SCNC: 8 MMOL/L (ref 8–16)
BACTERIA STL CULT: ABNORMAL
BACTERIA STL CULT: ABNORMAL
BILIRUB UR QL STRIP: NEGATIVE
BUN SERPL-MCNC: 28 MG/DL (ref 8–23)
CALCIUM SERPL-MCNC: 9.2 MG/DL (ref 8.7–10.5)
CHLORIDE SERPL-SCNC: 108 MMOL/L (ref 95–110)
CLARITY UR REFRACT.AUTO: CLEAR
CO2 SERPL-SCNC: 22 MMOL/L (ref 23–29)
COLOR UR AUTO: YELLOW
CREAT SERPL-MCNC: 2 MG/DL (ref 0.5–1.4)
CREAT UR-MCNC: 98 MG/DL (ref 15–325)
ERYTHROCYTE [DISTWIDTH] IN BLOOD BY AUTOMATED COUNT: 14.6 % (ref 11.5–14.5)
EST. GFR  (NO RACE VARIABLE): 25.7 ML/MIN/1.73 M^2
GLUCOSE SERPL-MCNC: 252 MG/DL (ref 70–110)
GLUCOSE UR QL STRIP: ABNORMAL
HCT VFR BLD AUTO: 31.9 % (ref 37–48.5)
HGB BLD-MCNC: 10.3 G/DL (ref 12–16)
HGB UR QL STRIP: NEGATIVE
KETONES UR QL STRIP: NEGATIVE
LEUKOCYTE ESTERASE UR QL STRIP: NEGATIVE
MAGNESIUM SERPL-MCNC: 1.7 MG/DL (ref 1.6–2.6)
MCH RBC QN AUTO: 28 PG (ref 27–31)
MCHC RBC AUTO-ENTMCNC: 32.3 G/DL (ref 32–36)
MCV RBC AUTO: 87 FL (ref 82–98)
NITRITE UR QL STRIP: NEGATIVE
OSMOLALITY UR: 287 MOSM/KG (ref 50–1200)
PH UR STRIP: 5 [PH] (ref 5–8)
PHOSPHATE SERPL-MCNC: 3.5 MG/DL (ref 2.7–4.5)
PLATELET # BLD AUTO: 219 K/UL (ref 150–450)
PMV BLD AUTO: 10.7 FL (ref 9.2–12.9)
POCT GLUCOSE: 271 MG/DL (ref 70–110)
POCT GLUCOSE: 315 MG/DL (ref 70–110)
POTASSIUM SERPL-SCNC: 3.8 MMOL/L (ref 3.5–5.1)
PROT UR QL STRIP: NEGATIVE
RBC # BLD AUTO: 3.68 M/UL (ref 4–5.4)
SODIUM SERPL-SCNC: 138 MMOL/L (ref 136–145)
SODIUM UR-SCNC: 29 MMOL/L (ref 20–250)
SP GR UR STRIP: 1.01 (ref 1–1.03)
URN SPEC COLLECT METH UR: ABNORMAL
WBC # BLD AUTO: 4.68 K/UL (ref 3.9–12.7)

## 2024-07-04 PROCEDURE — 84300 ASSAY OF URINE SODIUM: CPT

## 2024-07-04 PROCEDURE — 80048 BASIC METABOLIC PNL TOTAL CA: CPT

## 2024-07-04 PROCEDURE — 83935 ASSAY OF URINE OSMOLALITY: CPT

## 2024-07-04 PROCEDURE — 25000003 PHARM REV CODE 250

## 2024-07-04 PROCEDURE — 63600175 PHARM REV CODE 636 W HCPCS

## 2024-07-04 PROCEDURE — 36415 COLL VENOUS BLD VENIPUNCTURE: CPT

## 2024-07-04 PROCEDURE — 85027 COMPLETE CBC AUTOMATED: CPT

## 2024-07-04 PROCEDURE — 82570 ASSAY OF URINE CREATININE: CPT

## 2024-07-04 PROCEDURE — 84100 ASSAY OF PHOSPHORUS: CPT

## 2024-07-04 PROCEDURE — 83735 ASSAY OF MAGNESIUM: CPT

## 2024-07-04 PROCEDURE — 81003 URINALYSIS AUTO W/O SCOPE: CPT

## 2024-07-04 RX ORDER — LOPERAMIDE HYDROCHLORIDE 2 MG/1
2 CAPSULE ORAL 3 TIMES DAILY
Qty: 30 CAPSULE | Refills: 0 | Status: SHIPPED | OUTPATIENT
Start: 2024-07-04 | End: 2024-07-14

## 2024-07-04 RX ORDER — AZITHROMYCIN 500 MG/1
500 TABLET, FILM COATED ORAL DAILY
Qty: 8 TABLET | Refills: 0 | Status: SHIPPED | OUTPATIENT
Start: 2024-07-04 | End: 2024-07-12

## 2024-07-04 RX ORDER — DICYCLOMINE HYDROCHLORIDE 10 MG/1
10 CAPSULE ORAL 4 TIMES DAILY PRN
Qty: 40 CAPSULE | Refills: 0 | Status: SHIPPED | OUTPATIENT
Start: 2024-07-04

## 2024-07-04 RX ADMIN — LOPERAMIDE HYDROCHLORIDE 2 MG: 2 CAPSULE ORAL at 08:07

## 2024-07-04 RX ADMIN — PREGABALIN 150 MG: 150 CAPSULE ORAL at 08:07

## 2024-07-04 RX ADMIN — DULOXETINE HYDROCHLORIDE 60 MG: 30 CAPSULE, DELAYED RELEASE ORAL at 08:07

## 2024-07-04 RX ADMIN — AMLODIPINE BESYLATE 10 MG: 10 TABLET ORAL at 08:07

## 2024-07-04 RX ADMIN — LOPERAMIDE HYDROCHLORIDE 2 MG: 2 CAPSULE ORAL at 02:07

## 2024-07-04 RX ADMIN — LABETALOL HYDROCHLORIDE 300 MG: 100 TABLET, FILM COATED ORAL at 08:07

## 2024-07-04 RX ADMIN — INSULIN ASPART 3 UNITS: 100 INJECTION, SOLUTION INTRAVENOUS; SUBCUTANEOUS at 08:07

## 2024-07-04 RX ADMIN — HEPARIN SODIUM 5000 UNITS: 5000 INJECTION INTRAVENOUS; SUBCUTANEOUS at 05:07

## 2024-07-04 RX ADMIN — ASPIRIN 81 MG CHEWABLE TABLET 81 MG: 81 TABLET CHEWABLE at 08:07

## 2024-07-04 NOTE — NURSING
Care provided to patient as per POC and as charted.  Pt. Continues with loose stool, Pt. Was able to provide a urine sample.  No acute events overnight.

## 2024-07-04 NOTE — PLAN OF CARE
Patient discharged to home w/ NN.    GERDA DelaneyN, RN, [facility]  Transitional Care Manager  [phone]  [email or web address]    [facility]  Discharge Final Note    Primary Care Provider: Mamie Cotton MD    Expected Discharge Date: 7/4/2024    Final Discharge Note (most recent)       Final Note - 07/04/24 1512          Final Note    Assessment Type Final Discharge Note     Anticipated Discharge Disposition Home or Self Care        Post-Acute Status    Post-Acute Authorization Other     Other Status No Post-Acute Service Needs     Discharge Delays None known at this time                     Important Message from Medicare             Contact Info       Mamie Cotton MD   Specialty: Internal Medicine   Relationship: PCP - General    [id number]  [facility] [address]   Phone: [phone]       Next Steps: Call in 1 week(s)

## 2024-07-04 NOTE — NURSING
Pt is AAOx4.  Stable condition, No distress noted. Has dexcon monitor intact, skin intact no complaints of any at this time   Call light in reach. Bed in low locked position.   Side rails x2. Belongings at bedside.   Pt free of fall and injuries Questions and concerns voiced and answered.    Plan of care continues.

## 2024-07-04 NOTE — NURSING
Re assessed patient BCG as she was drinking non diet soda.  Pt. BCG higher than original assessment, Insuline given per MAR.

## 2024-07-04 NOTE — DISCHARGE SUMMARY
[facility] - [facility] [address] [place] [address] Medicine  Discharge Summary      Patient Name: Annika Mac  MRN: [id number]  ESTRELLA: [id number]  Patient Class: IP- Inpatient  Admission Date: 7/1/2024  Hospital Length of Stay: 2 days  Discharge Date and Time:  07/04/2024 11:32 AM  Attending Physician: Raquel Pool MD   Discharging Provider: Joe Fan PA-C  Primary Care Provider: Mamie Cotton MD  [facility] Medicine Team: [facility] Joe Fan PA-C  Primary Care Team: [facility] MED E    HPI:   Annika Mac is a 73 y.o. female with GERD, peripheral artery disease, DM2, history of DVT, COPD presents for diarrhea and abdominal pain. Patient reports 4-5 days of right-sided abdominal cramping with associated diarrhea. She reports the diarrhea is watery with > 10 episodes of diarrhea a day. Has associated with nausea but no vomiting. Describes the abdominal pain as sharp. She does report a small spot of blood from a known hemorrhoid but denies any other blood in her stool. She denies any international travel, recent antibiotic use, recent hospitalizations, dysuria, fevers, chills.     ED: mildly hypertensive, otherwise VSS. CBC without leukocytosis, CMP with Cr 2.2 (baseline ~1.6). Covid negative. CT a/p with subtle stranding about ascending colon near the hepatic flexure as above, could represent underlying colitis. Given 1L, zofran and bentyl in the ED and admitted to hospital medicine.     * No surgery found *      Hospital Course:   Annika Mac is placed in  Inpatient for management of abdominal pain and diarrhea in setting of colitis. CTAP with: Bowel kidneys are normal in size and location. Mild nonspecific bilateral perinephric stranding. Bilateral hypodensities compatible with cysts, largest on the right measuring 4.2 cm. No convincing renal calculi. No hydronephrosis or hydroureter. Subtle stranding about ascending colon near the hepatic flexure as above, could represent underlying colitis. C diff  negative, WBC stool pos for many neutrophils, started on IV cipro and po flagyl. Stool culture pos to Campylobacter species. Dc'd cipro flagyl and started on IV azithromycin. Creatinine elevated c/w GEORGE in CKD, now improving s/p IVF and trending towards baseline. Patient symptomatically improved with abdominal pain and diarrhea improving.     At discharge she will continue po azithromycin to complete 10 day antibiotic course and continue loperamide and bentyl. She will follow up with PCP. Return precautions provided. Patient medically ready for discharge. Plan of care discussed with patient, patient agreeable to plan, and all questions answered.      Physical Exam    General: NAD. Obese.  Neuro: Alert and oriented. No focal deficits.  HEENT: EOMI. No JVD appreciated.  CVS: RRR.  Respiratory: Normal respiratory effort.    Abdominal: NTND, soft to palpation.    Extremities: Pulses full, equal, and regular over all 4 extremities.      Goals of Care Treatment Preferences:  Code Status: Full Code      Consults:     No new Assessment & Plan notes have been filed under this hospital service since the last note was generated.  Service: Hospital Medicine    Final Active Diagnoses:    Diagnosis Date Noted POA    PRINCIPAL PROBLEM:  Colitis [K52.9] 07/01/2024 Yes    Diarrhea [R19.7] 07/01/2024 Yes    Acute kidney injury superimposed on chronic kidney disease [N17.9, N18.9] 07/01/2024 Yes    Dyslipidemia associated with type 2 diabetes mellitus [E11.69, E78.5] 09/13/2021 Yes    Anemia of renal disease [N18.9, D63.1] 09/21/2020 Yes    Stage 3b chronic kidney disease [N18.32] 02/13/2019 Yes    GERD (gastroesophageal reflux disease) [K21.9] 01/27/2017 Yes    Essential hypertension [I10] 10/27/2016 Yes    Diastolic dysfunction [I51.89] 07/31/2015 Yes     Chronic    Type 2 diabetes mellitus with diabetic polyneuropathy, with long-term current use of insulin [E11.42, Z79.4] 07/27/2012 Not Applicable    RUFINA (obstructive sleep apnea)  [G47.33] 07/27/2012 Yes    History of stroke [Z86.73] 08/01/2003 Not Applicable      Problems Resolved During this Admission:       Discharged Condition: stable    Disposition: Home or Self Care    Follow Up:   Follow-up Information       Mamie Cotton MD. Call in 1 week(s).    Specialty: Internal Medicine  Contact information:  [address]  [facility] [address]  [phone]                           Patient Instructions:      Diet diabetic     Notify your health care provider if you experience any of the following:  temperature >100.4     Notify your health care provider if you experience any of the following:  persistent nausea and vomiting or diarrhea     Notify your health care provider if you experience any of the following:  severe uncontrolled pain     Notify your health care provider if you experience any of the following:  difficulty breathing or increased cough     Notify your health care provider if you experience any of the following:  persistent dizziness, light-headedness, or visual disturbances     Notify your health care provider if you experience any of the following:  increased confusion or weakness     Activity as tolerated       Significant Diagnostic Studies: N/A    Pending Diagnostic Studies:       None           Medications:  Reconciled Home Medications:      Medication List        START taking these medications      azithromycin 500 MG tablet  Commonly known as: ZITHROMAX  Take 1 tablet (500 mg total) by mouth once daily. for 8 days     dicyclomine 10 MG capsule  Commonly known as: BENTYL  Take 1 capsule (10 mg total) by mouth 4 (four) times daily as needed (abdominal pain).     loperamide 2 mg capsule  Commonly known as: IMODIUM  Take 1 capsule (2 mg total) by mouth 3 (three) times daily. for 10 days            CONTINUE taking these medications      albuterol 90 mcg/actuation inhaler  Commonly known as: PROAIR HFA  Inhale 2 puffs into the lungs every 6 (six) hours as  "needed for Wheezing. Rescue     allopurinoL 300 MG tablet  Commonly known as: ZYLOPRIM     amLODIPine 10 MG tablet  Commonly known as: NORVASC  TAKE 1 TABLET BY MOUTH ONCE  DAILY     aspirin 81 MG Chew  Take 1 tablet (81 mg total) by mouth once daily.     atorvastatin 80 MG tablet  Commonly known as: LIPITOR  Take 1 tablet (80 mg total) by mouth every evening.     BD ULTRA-FINE KIKA PEN NEEDLE 32 gauge x 5/32" Ndle  Generic drug: pen needle, diabetic  USE 4 TIMES DAILY     blood sugar diagnostic Strp  1 strip 4 times daily. Contour Next.     blood-glucose meter kit  1 each by Other route once daily. Use daily. Insurance proffered one touch  E11.42     chlorthalidone 25 MG Tab  Commonly known as: HYGROTEN  Take 1 tablet (25 mg total) by mouth once daily.     CONSTULOSE 10 gram/15 mL solution  Generic drug: lactulose  SMARTSI Milliliter(s) By Mouth Twice Daily PRN     DEXCOM G7  Misc  Generic drug: blood-glucose meter,continuous  Use as directed     * DEXCOM G7 SENSOR MISC  by Misc.(Non-Drug; Combo Route) route.     * DEXCOM G7 SENSOR Terese  Generic drug: blood-glucose sensor  1 Device by Misc.(Non-Drug; Combo Route) route every 10 days.     DULoxetine 30 MG capsule  Commonly known as: CYMBALTA  Take 2 capsules (60 mg total) by mouth once daily.     GVOKE HYPOPEN 1-PACK 0.5 mg/0.1 mL Atin  Generic drug: glucagon  Inject 1 Dose into the skin daily as needed (for severe hypoglycemia if you aren't able to treat by ingesting sugar).     insulin lispro 100 unit/mL pen  Commonly known as: HumaLOG KwikPen Insulin  INJECT 13 UNITS INTO THE  SKIN 3 TIMES DAILY BEFORE  MEALS PLUS SLIDING SCALE -  MAX TOTAL DAILY DOSE 70  UNITS     irbesartan 300 MG tablet  Commonly known as: AVAPRO  Take 1 tablet (300 mg total) by mouth every evening.     JARDIANCE 10 mg tablet  Generic drug: empagliflozin  TAKE 1 TABLET BY MOUTH ONCE  DAILY     * ketoconazole 2 % shampoo  Commonly known as: NIZORAL  Wash hair with medicated shampoo " at least 2x/week - let sit on scalp at least 5 minutes prior to rinsing     * ketoconazole 2 % cream  Commonly known as: NIZORAL  Apply topically once daily.     labetaloL 300 MG tablet  Commonly known as: NORMODYNE  Take 1 tablet (300 mg total) by mouth 2 (two) times daily.     lancets 31 gauge Misc  1 lancet by Misc.(Non-Drug; Combo Route) route 4 (four) times daily. E11.42 . Prescribed one touch     mometasone 0.1 % ointment  Commonly known as: ELOCON  Apply topically once daily. Apply twice daily 1 week on, 1 week off for 3-6 months     multivitamin per tablet  Commonly known as: THERAGRAN  Take 1 tablet by mouth once daily.     pregabalin 150 MG capsule  Commonly known as: LYRICA  Take 1 capsule (150 mg total) by mouth 2 (two) times daily.     sodium bicarbonate 325 MG tablet  Take 2 tablets (650 mg total) by mouth 2 (two) times daily.     TRESIBA FLEXTOUCH U-100 100 unit/mL (3 mL) insulin pen  Generic drug: insulin degludec  Inject 45 Units into the skin every evening.     TRULICITY 4.5 mg/0.5 mL pen injector  Generic drug: dulaglutide  INJECT THE CONTENTS OF ONE PEN  SUBCUTANEOUSLY WEEKLY AS  DIRECTED           * This list has 4 medication(s) that are the same as other medications prescribed for you. Read the directions carefully, and ask your doctor or other care provider to review them with you.                  Indwelling Lines/Drains at time of discharge:   Lines/Drains/Airways       None                   Time spent on the discharge of patient: 45 minutes         Joe Fan PA-C  Department of Hospital Medicine  [facility] - [facility] 11H

## 2024-07-04 NOTE — PLAN OF CARE
Problem: Adult Inpatient Plan of Care  Goal: Plan of Care Review  Outcome: Progressing  Goal: Patient-Specific Goal (Individualized)  Outcome: Progressing  Goal: Absence of Hospital-Acquired Illness or Injury  Outcome: Progressing  Goal: Optimal Comfort and Wellbeing  Outcome: Progressing  Goal: Readiness for Transition of Care  Outcome: Progressing     Problem: Bariatric Environmental Safety  Goal: Safety Maintained with Care  Outcome: Progressing     Problem: Infection  Goal: Absence of Infection Signs and Symptoms  Outcome: Progressing     Problem: Diabetes Comorbidity  Goal: Blood Glucose Level Within Targeted Range  Outcome: Progressing     Problem: Acute Kidney Injury/Impairment  Goal: Fluid and Electrolyte Balance  Outcome: Progressing  Goal: Improved Oral Intake  Outcome: Progressing  Goal: Effective Renal Function  Outcome: Progressing     Problem: Obstructive Sleep Apnea Risk or Actual  Goal: Unobstructed Breathing During Sleep  Outcome: Progressing     Problem: Heart Failure  Goal: Optimal Coping  Outcome: Progressing  Goal: Optimal Cardiac Output  Outcome: Progressing  Goal: Stable Heart Rate and Rhythm  Outcome: Progressing  Goal: Optimal Functional Ability  Outcome: Progressing  Goal: Fluid and Electrolyte Balance  Outcome: Progressing  Goal: Improved Oral Intake  Outcome: Progressing  Goal: Effective Oxygenation and Ventilation  Outcome: Progressing  Goal: Effective Breathing Pattern During Sleep  Outcome: Progressing     Problem: Comorbidity Management  Goal: Blood Pressure in Desired Range  Outcome: Progressing     Problem: Chronic Kidney Disease  Goal: Optimal Coping with Chronic Illness  Outcome: Progressing  Goal: Electrolyte Balance  Outcome: Progressing  Goal: Fluid Balance  Outcome: Progressing  Goal: Optimal Functional Ability  Outcome: Progressing  Goal: Absence of Anemia Signs and Symptoms  Outcome: Progressing  Goal: Optimal Oral Intake  Outcome: Progressing  Goal: Acceptable Pain  Control  Outcome: Progressing  Goal: Minimize Renal Failure Effects  Outcome: Progressing     Problem: Diarrhea  Goal: Effective Diarrhea Management  Outcome: Progressing     Problem: Fall Injury Risk  Goal: Absence of Fall and Fall-Related Injury  Outcome: Progressing     Problem: Cardiac Impairment  Goal: Optimal Activity Tolerance  Outcome: Progressing

## 2024-07-08 ENCOUNTER — PATIENT OUTREACH (OUTPATIENT)
Dept: ADMINISTRATIVE | Facility: CLINIC | Age: 74
End: 2024-07-08
Payer: MEDICARE

## 2024-07-08 NOTE — PROGRESS NOTES
C3 nurse attempted to contact Annika Mac for a TCC post hospital discharge follow up call. No answer. Left voicemail with callback information. The patient has a scheduled HOSFU appointment with Mamie Cotton on 07/12/24 @ 4593.

## 2024-07-12 ENCOUNTER — OFFICE VISIT (OUTPATIENT)
Dept: INTERNAL MEDICINE | Facility: CLINIC | Age: 74
End: 2024-07-12
Payer: MEDICARE

## 2024-07-12 VITALS
HEIGHT: 65 IN | WEIGHT: 293 LBS | BODY MASS INDEX: 48.82 KG/M2 | DIASTOLIC BLOOD PRESSURE: 54 MMHG | OXYGEN SATURATION: 98 % | SYSTOLIC BLOOD PRESSURE: 116 MMHG | HEART RATE: 63 BPM

## 2024-07-12 DIAGNOSIS — I10 ESSENTIAL HYPERTENSION: ICD-10-CM

## 2024-07-12 DIAGNOSIS — I12.9 TYPE 2 DIABETES MELLITUS WITH STAGE 3B CHRONIC KIDNEY DISEASE AND HYPERTENSION: ICD-10-CM

## 2024-07-12 DIAGNOSIS — N18.32 TYPE 2 DIABETES MELLITUS WITH STAGE 3B CHRONIC KIDNEY DISEASE AND HYPERTENSION: ICD-10-CM

## 2024-07-12 DIAGNOSIS — A04.5 CAMPYLOBACTER DIARRHEA: ICD-10-CM

## 2024-07-12 DIAGNOSIS — K52.9 COLITIS: Primary | ICD-10-CM

## 2024-07-12 DIAGNOSIS — E11.22 TYPE 2 DIABETES MELLITUS WITH STAGE 3B CHRONIC KIDNEY DISEASE AND HYPERTENSION: ICD-10-CM

## 2024-07-12 PROCEDURE — 99999 PR PBB SHADOW E&M-EST. PATIENT-LVL V: CPT | Mod: PBBFAC,,, | Performed by: INTERNAL MEDICINE

## 2024-07-12 RX ORDER — HYDROCORTISONE 25 MG/G
CREAM TOPICAL 2 TIMES DAILY PRN
Qty: 28 G | Refills: 1 | Status: SHIPPED | OUTPATIENT
Start: 2024-07-12

## 2024-07-12 NOTE — PROGRESS NOTES
"Subjective:       Patient ID: Annika Mac is a 73 y.o. female.    Chief Complaint: Hospital Follow Up (Colitis)  Transitional Care Note    Family and/or Caretaker present at visit?  No.  Diagnostic tests reviewed/disposition: No diagnosic tests pending after this hospitalization.  Disease/illness education: pt educated   Home health/community services discussion/referrals: Patient does not have home health established from hospital visit.  They do not need home health.  If needed, we will set up home health for the patient.   Establishment or re-establishment of referral orders for community resources: No other necessary community resources.   Discussion with other health care providers: No discussion with other health care providers necessary.     This is a 73-year-old who presents today for follow-up patient reports that she has been having issues with diarrhea patient reports that it lingered for several days and seem to be getting worse she was not able to keep food down and was feeling weak so she went to the ER where she got rehydrated and evaluated CT scan shows colitis.  She has also had some irritation in the rectal region from all the diarrhea they discharged her on a medicine for the cramping as well as Imodium and a course of azithromycin.  She reports symptoms are doing better she does not having the diarrhea that she was having although she is still having some cramping it seems to be improving her appetite is still a bit poor she has sticking with a bland diet and her blood pressure has been running lower than normal.     HPI  Review of Systems   Constitutional:  Positive for fatigue. Negative for fever.   Gastrointestinal:         Gi symptoms imprved   Genitourinary:         Hemorrhis at times  Irritatio from diarrhea        Objective:    Blood pressure (!) 116/54, pulse 63, height 5' 5" (1.651 m), weight (!) 136.4 kg (300 lb 11.3 oz), SpO2 98%.   Physical Exam  Constitutional:       General: She is " not in acute distress.     Comments: Walks with walker   HENT:      Head: Normocephalic.      Mouth/Throat:      Pharynx: Oropharynx is clear.   Eyes:      General: No scleral icterus.  Cardiovascular:      Rate and Rhythm: Normal rate and regular rhythm.      Heart sounds: Murmur heard.      No friction rub. No gallop.   Pulmonary:      Effort: Pulmonary effort is normal. No respiratory distress.      Breath sounds: Normal breath sounds.   Abdominal:      General: Bowel sounds are normal.      Palpations: Abdomen is soft. There is no mass.      Tenderness: There is no abdominal tenderness.   Genitourinary:     Comments: Irritaion , excoriation   Musculoskeletal:      Cervical back: Neck supple.   Skin:     Findings: No erythema.   Neurological:      Mental Status: She is alert.   Psychiatric:         Mood and Affect: Mood normal.         Assessment:       1. Colitis    2. Campylobacter diarrhea    3. Type 2 diabetes mellitus with stage 3b chronic kidney disease and hypertension    4. Essential hypertension        Plan:       Annika was seen today for hospital follow up.    Diagnoses and all orders for this visit:    Colitis  Discussed referral placed if needed she did have a recent colonoscopy  -     Ambulatory referral/consult to Gastroenterology; Future    Campylobacter diarrhea  Reviewed with patient she will complete her course of azithromycin    Type 2 diabetes mellitus with stage 3b chronic kidney disease and hypertension  Continue current regimen    Essential hypertension  Some lows recently with dehydration continue hydration she is starting with a bland diet and she will advance as tolerated if blood pressure remains low we discussed reduction of her amlodipine to half a tablet expect this will trend back up as she starts to eat normally    Intermittent hemorrhoid and rectal irritation prescription provided also discussed using A&D ointment and or A&D diaper cream for barrier protection  -     hydrocortisone  (ANUSOL-HC) 2.5 % rectal cream; Place rectally 2 (two) times daily as needed for Hemorrhoids.    Follow-up if no improvement bland diet advance as tolerated she will hold Imodium if not needed and can use her Bentyl    She will keep her follow-up as previously scheduled

## 2024-07-15 ENCOUNTER — NURSE TRIAGE (OUTPATIENT)
Dept: ADMINISTRATIVE | Facility: CLINIC | Age: 74
End: 2024-07-15
Payer: MEDICARE

## 2024-07-15 ENCOUNTER — HOSPITAL ENCOUNTER (EMERGENCY)
Facility: HOSPITAL | Age: 74
Discharge: HOME | End: 2024-07-15
Attending: FAMILY MEDICINE
Payer: MEDICARE

## 2024-07-15 VITALS
HEIGHT: 65 IN | DIASTOLIC BLOOD PRESSURE: 56 MMHG | SYSTOLIC BLOOD PRESSURE: 146 MMHG | WEIGHT: 280 LBS | HEART RATE: 68 BPM | RESPIRATION RATE: 17 BRPM | OXYGEN SATURATION: 99 % | BODY MASS INDEX: 46.65 KG/M2 | TEMPERATURE: 98 F

## 2024-07-15 DIAGNOSIS — R22.0 LIP SWELLING: Primary | ICD-10-CM

## 2024-07-15 DIAGNOSIS — T78.3XXA ANGIOEDEMA, INITIAL ENCOUNTER: ICD-10-CM

## 2024-07-15 PROCEDURE — 96374 THER/PROPH/DIAG INJ IV PUSH: CPT | Mod: ER

## 2024-07-15 PROCEDURE — 99284 EMERGENCY DEPT VISIT MOD MDM: CPT | Mod: ER,25

## 2024-07-15 PROCEDURE — 96375 TX/PRO/DX INJ NEW DRUG ADDON: CPT | Mod: ER

## 2024-07-15 PROCEDURE — 63600175 PHARM REV CODE 636 W HCPCS: Mod: ER

## 2024-07-15 RX ORDER — DIPHENHYDRAMINE HYDROCHLORIDE 50 MG/ML
50 INJECTION INTRAMUSCULAR; INTRAVENOUS
Status: COMPLETED | OUTPATIENT
Start: 2024-07-15 | End: 2024-07-15

## 2024-07-15 RX ORDER — DIPHENHYDRAMINE HCL 50 MG
50 CAPSULE ORAL
Status: DISCONTINUED | OUTPATIENT
Start: 2024-07-15 | End: 2024-07-15

## 2024-07-15 RX ORDER — PREDNISONE 20 MG/1
60 TABLET ORAL
Status: DISCONTINUED | OUTPATIENT
Start: 2024-07-15 | End: 2024-07-15

## 2024-07-15 RX ORDER — DEXAMETHASONE SODIUM PHOSPHATE 4 MG/ML
8 INJECTION, SOLUTION INTRA-ARTICULAR; INTRALESIONAL; INTRAMUSCULAR; INTRAVENOUS; SOFT TISSUE
Status: COMPLETED | OUTPATIENT
Start: 2024-07-15 | End: 2024-07-15

## 2024-07-15 RX ADMIN — DEXAMETHASONE SODIUM PHOSPHATE 8 MG: 4 INJECTION, SOLUTION INTRAMUSCULAR; INTRAVENOUS at 10:07

## 2024-07-15 RX ADMIN — DIPHENHYDRAMINE HYDROCHLORIDE 50 MG: 50 INJECTION INTRAMUSCULAR; INTRAVENOUS at 10:07

## 2024-07-15 NOTE — DISCHARGE INSTRUCTIONS
Stop taking your irbesartan until you follow up with your primary care doctor    What is angioedema? -- Angioedema is the medical term for swelling of the tissue under the skin. This can be caused by different things, including an allergic reaction to certain medicines. Your blood pressure medication may be causing this reaction    The swelling can happen within a few weeks of starting the medicine. But in some cases, it can happen even after months or years of taking the medication every day without any swelling. Doctors are not sure why this happens.  The swelling usually lasts a few days and is not itchy.  What part of the body swells? -- The swelling can happen on the lips, face, or tongue. It can also affect other parts of the body, including the intestines.  Depending on where the swelling is, it can cause different problems:  Swelling of the tongue, mouth, and lips can be very dangerous if it makes it hard for you to breathe or swallow. You should go to the emergency department right away if you get swelling in or near your mouth or throat. If you are having trouble breathing, you might need to call for an ambulance (in the [place] and [place], dial [id number]).   In the hospital, the doctors will monitor your breathing. If the swelling is causing your throat to close, they can put a tube in your throat to help you breathe. You will stay in the hospital until the swelling goes down.  If swelling happens in the intestines, it can cause pain, vomiting, or diarrhea.

## 2024-07-15 NOTE — ED PROVIDER NOTES
Encounter Date: 7/15/2024       History     Chief Complaint   Patient presents with    Allergic Reaction     Pt C/O oral swelling following eating fruit cocktail last PM. Pt denies SOB.  NADN.      Annika Mac is a 73 y.o. female who has a PMHx asthma, COPD, DVT, diabetes, GERD, RUFINA, PAD, presenting to the Emergency Department for allergic reaction. Patient reports she ate green beans, stewed chicken and a grape fruit cocktail cup last night. Around 11:30 pm she got up to use the bathroom in the night and noticed her lower lip was swollen. She reports this morning it was worse so she called EMS for evaluation. She reports additional swelling beneath her tongue and to her tongue. She reports some shortness of breath this morning, but none now. Recently completed 10 day course of azithromycin  on 7/14 for colitis. Also taking imodium and bentyl prn. No other antibiotics. No known food allergies. Denies belly pain, nausea, vomiting, fevers, rash. Tolerating secretions. Speech clear.         The history is provided by the patient.     Review of patient's allergies indicates:   Allergen Reactions    Clindamycin Hives and Swelling    Iodinated contrast media Rash     Past Medical History:   Diagnosis Date    Asthma     Chronic obstructive pulmonary disease, unspecified COPD type 1/14/2022    Constipation 10/3/2019    Deep vein thrombophlebitis of left leg 7/27/2012    Deep vein thrombosis     when she had a knee replacement    Diabetic foot ulcer with osteomyelitis     Encounter for blood transfusion     anemic     GERD (gastroesophageal reflux disease)     Obesity, diabetes, and hypertension syndrome 2/13/2019    Obstructive sleep apnea 7/27/2012    PAD (peripheral artery disease) 11/21/2013    Pressure ulcer of toe of left foot     Type 2 diabetes mellitus with obesity 8/19/2020    Type 2 diabetes mellitus with peripheral artery disease 8/19/2020     Past Surgical History:   Procedure Laterality Date    CATARACT  EXTRACTION W/  INTRAOCULAR LENS IMPLANT Left 3/2002    left     CATARACT EXTRACTION W/  INTRAOCULAR LENS IMPLANT Right     OD dr. olivares     SECTION      COLONOSCOPY N/A 2018    Procedure: COLONOSCOPY;  Surgeon: Faizan Mac MD;  Location: [facility] (2ND FLR);  Service: Endoscopy;  Laterality: N/A;    COLONOSCOPY N/A 2023    Procedure: COLONOSCOPY;  Surgeon: Pa Zamora MD;  Location: [facility] (2ND FLR);  Service: Endoscopy;  Laterality: N/A;    CYST REMOVAL  2011    left side of face    CYSTOSCOPY W/ RETROGRADES Left 2020    Procedure: CYSTOSCOPY, WITH RETROGRADE PYELOGRAM;  Surgeon: Noman Rivas MD;  Location: [facility] 1ST FLR;  Service: Urology;  Laterality: Left;  30min    DE QUERVAIN'S RELEASE  2021    Procedure: RELEASE, HAND, FOR DEQUERVAIN'S TENOSYNOVITIS;  Surgeon: Marky Hagen MD;  Location: [facility];  Service: Orthopedics;;    ESOPHAGOGASTRODUODENOSCOPY N/A 2023    Procedure: EGD (ESOPHAGOGASTRODUODENOSCOPY);  Surgeon: Pa Zamora MD;  Location: [facility] (2ND FLR);  Service: Endoscopy;  Laterality: N/A;  suprep-[facility] mail-bmi 49.09-tb  23- No answer for precall- [place]    EYE SURGERY Bilateral 2012    eye implants    GANGLION CYST EXCISION  2007    Right wrist    INJECTION OF ANESTHETIC AGENT AROUND MEDIAL BRANCH NERVES INNERVATING LUMBAR FACET JOINT Bilateral 2022    Procedure: Block-nerve-medial branch-lumbar bilateral L4-5 and L5-S1;  Surgeon: Alireza Meraz Jr., MD;  Location: [facility] PAIN MGT;  Service: Pain Management;  Laterality: Bilateral;  pt is diabetic   asa    INJECTION OF ANESTHETIC AGENT AROUND MEDIAL BRANCH NERVES INNERVATING LUMBAR FACET JOINT Bilateral 2022    Procedure: Block-nerve-medial branch-lumbar bilaterl L4-5 and L5-S1;  Surgeon: Alireza Meraz Jr., MD;  Location: [facility] PAIN MGT;  Service: Pain Management;  Laterality: Bilateral;  pt is diabetic     INJECTION OF FACET JOINT Bilateral  5/6/2021    Procedure: INJECTION, FACET JOINT--Bilateral L4-5, L5-S1;  Surgeon: Alireza Meraz Jr., MD;  Location: [facility] PAIN MGT;  Service: Pain Management;  Laterality: Bilateral;  Oral    INTERPOSITION ARTHROPLASTY OF CARPOMETACARPAL JOINTS Left 1/8/2021    Procedure: INTERPOSITION ARTHROPLASTY, CMC JOINT - left dequervains and cmc arthroplasty, arthrex;  Surgeon: Marky Hagen MD;  Location: [facility] OR;  Service: Orthopedics;  Laterality: Left;    JOINT REPLACEMENT Left     Pan Retinal Photocoagulation Bilateral     Dr. Monterroso (Proliferative Diabetic Retinopathy)    RADIOFREQUENCY ABLATION OF LUMBAR MEDIAL BRANCH NERVE AT SINGLE LEVEL Bilateral 12/20/2023    Procedure: Radiofrequency Ablation, Nerve, Spinal, Lumbar, bilateral L4/5 L5/S1;  Surgeon: Alejandrina Roa DO;  Location: [facility] PAIN MANAGEMENT;  Service: Pain Management;  Laterality: Bilateral;  diabetic  asa    RADIOFREQUENCY THERMOCOAGULATION Right 5/12/2022    Procedure: RADIOFREQUENCY THERMAL COAGULATION RIGHT L4-5, L5-S1 (IV Sedation);  Surgeon: Alireza Meraz Jr., MD;  Location: [facility] PAIN MGT;  Service: Pain Management;  Laterality: Right;  diabetic    RADIOFREQUENCY THERMOCOAGULATION Left 5/19/2022    Procedure: RADIOFREQUENCY THERMAL COAGULATION LEFT L4-5, L5-S1 (IV Sedation);  Surgeon: Alireza Meraz Jr., MD;  Location: [facility] PAIN MGT;  Service: Pain Management;  Laterality: Left;  diabetic    TOTAL ABDOMINAL HYSTERECTOMY  1982    TOTAL KNEE ARTHROPLASTY  2/2006    left    TRIGGER FINGER RELEASE  9/18/2009     Family History   Problem Relation Name Age of Onset    Diabetes Mother      Hypertension Mother      Heart disease Father      Diabetes Sister X4     No Known Problems Sister unknown hx     No Known Problems Brother X2-unknown hx     Thyroid disease Brother X4     Diabetes Brother X4     Hypertension Brother X4     No Known Problems Daughter      No Known Problems Daughter      No Known Problems Son      Amblyopia Neg Hx       Blindness Neg Hx      Glaucoma Neg Hx      Macular degeneration Neg Hx      Retinal detachment Neg Hx      Strabismus Neg Hx      Colon cancer Neg Hx      Esophageal cancer Neg Hx       Social History     Tobacco Use    Smoking status: Never    Smokeless tobacco: Never   Substance Use Topics    Alcohol use: No    Drug use: No     Review of Systems   Constitutional:  Negative for chills and fever.   HENT:  Negative for congestion.         Mild tongue and lower lip swelling   Respiratory:  Negative for shortness of breath.    Cardiovascular:  Negative for chest pain.   Gastrointestinal:  Negative for nausea and vomiting.   Skin:  Negative for color change and rash.   Neurological:  Negative for headaches.   Psychiatric/Behavioral:  Negative for agitation and confusion.        Physical Exam     Initial Vitals [07/15/24 1006]   BP Pulse Resp Temp SpO2   (!) 146/56 (!) 59 20 98.3 °F (36.8 °C) 100 %      MAP       --         Physical Exam    Nursing note and vitals reviewed.  Constitutional: She appears well-developed and well-nourished. She is not diaphoretic.  Non-toxic appearance. No distress.   HENT:   Head: Normocephalic and atraumatic.   Right Ear: Hearing and external ear normal.   Left Ear: Hearing and external ear normal.   Nose: Nose normal.   Mouth/Throat: Uvula is midline and oropharynx is clear and moist. No trismus in the jaw. No uvula swelling.   Questionable lower lip swelling, mild if any. There is a very superficial wound to inner lower lip with minimal bruising. Mild edema to inferior palate. No posterior pharyngeal edema. Tolerating secretions. Phonation normal.    Eyes: EOM are normal.   Neck: Neck supple.   Normal range of motion.  Cardiovascular:  Normal rate and regular rhythm.           Murmur heard.  Pulmonary/Chest: Breath sounds normal. No respiratory distress. She has no wheezes.   Abdominal: She exhibits no distension.   Musculoskeletal:         General: Normal range of motion.      Cervical  back: Normal range of motion and neck supple. Normal range of motion.     Neurological: She is alert and oriented to person, place, and time. GCS score is 15. GCS eye subscore is 4. GCS verbal subscore is 5. GCS motor subscore is 6.   Skin: Skin is warm and dry. No rash noted.   Psychiatric: She has a normal mood and affect. Her behavior is normal. Judgment and thought content normal.         ED Course   Procedures  Labs Reviewed - No data to display       Imaging Results    None          Medications   diphenhydrAMINE injection 50 mg (50 mg Intravenous Given 7/15/24 1045)   dexAMETHasone injection 8 mg (8 mg Intravenous Given 7/15/24 1045)     Medical Decision Making  73-year-old female presents with edema to lower lip and inferior palate. She presents non toxic and in no distress. Questionable lower lip swelling, mild if any. There is a very superficial wound to inner lower lip with minimal bruising. Mild edema to inferior palate. No posterior pharyngeal edema. Tolerating secretions. Phonation normal. Hemodynamically stable. No ACEi, however takes irbesartan. Discussed with my attending and will administer IV benadryl, steroids. Will monitor.     Allergic reaction, angioedema, anaphylaxis, injury, trauma, adverse effect of medication    Risk  OTC drugs.  Prescription drug management.               ED Course as of 07/15/24 1341   Mon Jul 15, 2024   1018 Discussed patient with my attending Dr. Granados. Single dose of IV steroids and benadryl. Will monitor.  [CS]   1156 Edema is improved [CS]   1339 Swelling continued improvement. No identified trigger for angioedema except irbesartan. Patient is instructed to discontinue this medication and obtain follow up appointment with her primary care doctor to discuss blood pressure management.  ED return precautions discussed with patient for worsening signs and symptoms, shortness on breath, worsening swelling, etc. She is to closely monitor glucose as she was given single  dose of steroids here today. Encouraged to adhere to strict diabetic diet the next few days. She is without complaint and stable for DC at this time. All questions answered [CS]      ED Course User Index  [CS] Laquita Cedeno PA-C                           Clinical Impression:  Final diagnoses:  [R22.0] Lip swelling (Primary)  [T78.3XXA] Angioedema, initial encounter          ED Disposition Condition    Discharge Stable          ED Prescriptions    None       Follow-up Information       Follow up With Specialties Details Why Contact Info    Mamie Cotton MD Internal Medicine Call today  [address]  [place] [address]  [phone]               Laquita Cedeno PA-C  07/15/24 1341       Laquita Cedeno PA-C  07/15/24 1341

## 2024-07-15 NOTE — TELEPHONE ENCOUNTER
Pt calling in, woke up this morning with lip swelling and trouble breathing. No new meds this morning but was in hospital recently and put on meds. Ate a frozen cup with fruit cocktail last night. Feels like under her tongue is swollen as well. Dispo is 911 now. Pt verbalized understanding and will call. Advised to call back with further concerns.     Reason for Disposition   Difficulty breathing or wheezing    Additional Information   Negative: Unresponsive, passed out or very weak   Negative: Swollen tongue    Protocols used: Lip Swelling-A-AH

## 2024-07-19 ENCOUNTER — OFFICE VISIT (OUTPATIENT)
Dept: INTERNAL MEDICINE | Facility: CLINIC | Age: 74
End: 2024-07-19
Payer: MEDICARE

## 2024-07-19 ENCOUNTER — OFFICE VISIT (OUTPATIENT)
Dept: PAIN MEDICINE | Facility: CLINIC | Age: 74
End: 2024-07-19
Payer: MEDICARE

## 2024-07-19 VITALS
WEIGHT: 293 LBS | HEART RATE: 66 BPM | DIASTOLIC BLOOD PRESSURE: 62 MMHG | SYSTOLIC BLOOD PRESSURE: 144 MMHG | BODY MASS INDEX: 48.82 KG/M2 | HEIGHT: 65 IN

## 2024-07-19 VITALS
HEART RATE: 61 BPM | SYSTOLIC BLOOD PRESSURE: 130 MMHG | HEIGHT: 65 IN | OXYGEN SATURATION: 100 % | DIASTOLIC BLOOD PRESSURE: 56 MMHG | BODY MASS INDEX: 48.78 KG/M2 | WEIGHT: 292.75 LBS

## 2024-07-19 DIAGNOSIS — T78.3XXS ANGIOEDEMA, SEQUELA: Primary | ICD-10-CM

## 2024-07-19 DIAGNOSIS — Z79.4 TYPE 2 DIABETES MELLITUS WITH BOTH EYES AFFECTED BY PROLIFERATIVE RETINOPATHY AND MACULAR EDEMA, WITH LONG-TERM CURRENT USE OF INSULIN: ICD-10-CM

## 2024-07-19 DIAGNOSIS — E11.3513 TYPE 2 DIABETES MELLITUS WITH BOTH EYES AFFECTED BY PROLIFERATIVE RETINOPATHY AND MACULAR EDEMA, WITH LONG-TERM CURRENT USE OF INSULIN: ICD-10-CM

## 2024-07-19 DIAGNOSIS — M51.36 DDD (DEGENERATIVE DISC DISEASE), LUMBAR: ICD-10-CM

## 2024-07-19 DIAGNOSIS — M47.816 FACET ARTHRITIS OF LUMBAR REGION: Primary | ICD-10-CM

## 2024-07-19 DIAGNOSIS — A04.5 CAMPYLOBACTER DIARRHEA: ICD-10-CM

## 2024-07-19 DIAGNOSIS — E66.01 MORBID OBESITY: ICD-10-CM

## 2024-07-19 DIAGNOSIS — M47.816 LUMBAR SPONDYLOSIS: ICD-10-CM

## 2024-07-19 DIAGNOSIS — I10 ESSENTIAL HYPERTENSION: ICD-10-CM

## 2024-07-19 DIAGNOSIS — G89.4 CHRONIC PAIN SYNDROME: ICD-10-CM

## 2024-07-19 DIAGNOSIS — E11.42 TYPE 2 DIABETES MELLITUS WITH DIABETIC POLYNEUROPATHY, UNSPECIFIED WHETHER LONG TERM INSULIN USE: ICD-10-CM

## 2024-07-19 PROCEDURE — 4010F ACE/ARB THERAPY RXD/TAKEN: CPT | Mod: CPTII,S$GLB,, | Performed by: NURSE PRACTITIONER

## 2024-07-19 PROCEDURE — 99999 PR PBB SHADOW E&M-EST. PATIENT-LVL V: CPT | Mod: PBBFAC,,, | Performed by: INTERNAL MEDICINE

## 2024-07-19 PROCEDURE — 3008F BODY MASS INDEX DOCD: CPT | Mod: CPTII,S$GLB,, | Performed by: NURSE PRACTITIONER

## 2024-07-19 PROCEDURE — 1125F AMNT PAIN NOTED PAIN PRSNT: CPT | Mod: CPTII,S$GLB,, | Performed by: NURSE PRACTITIONER

## 2024-07-19 PROCEDURE — 3288F FALL RISK ASSESSMENT DOCD: CPT | Mod: CPTII,S$GLB,, | Performed by: NURSE PRACTITIONER

## 2024-07-19 PROCEDURE — 3077F SYST BP >= 140 MM HG: CPT | Mod: CPTII,S$GLB,, | Performed by: NURSE PRACTITIONER

## 2024-07-19 PROCEDURE — 99999 PR PBB SHADOW E&M-EST. PATIENT-LVL V: CPT | Mod: PBBFAC,,, | Performed by: NURSE PRACTITIONER

## 2024-07-19 PROCEDURE — 1160F RVW MEDS BY RX/DR IN RCRD: CPT | Mod: CPTII,S$GLB,, | Performed by: INTERNAL MEDICINE

## 2024-07-19 PROCEDURE — 3078F DIAST BP <80 MM HG: CPT | Mod: CPTII,S$GLB,, | Performed by: NURSE PRACTITIONER

## 2024-07-19 PROCEDURE — 3051F HG A1C>EQUAL 7.0%<8.0%: CPT | Mod: CPTII,S$GLB,, | Performed by: NURSE PRACTITIONER

## 2024-07-19 PROCEDURE — 3288F FALL RISK ASSESSMENT DOCD: CPT | Mod: CPTII,S$GLB,, | Performed by: INTERNAL MEDICINE

## 2024-07-19 PROCEDURE — 99214 OFFICE O/P EST MOD 30 MIN: CPT | Mod: S$GLB,,, | Performed by: NURSE PRACTITIONER

## 2024-07-19 PROCEDURE — 3051F HG A1C>EQUAL 7.0%<8.0%: CPT | Mod: CPTII,S$GLB,, | Performed by: INTERNAL MEDICINE

## 2024-07-19 PROCEDURE — 4010F ACE/ARB THERAPY RXD/TAKEN: CPT | Mod: CPTII,S$GLB,, | Performed by: INTERNAL MEDICINE

## 2024-07-19 PROCEDURE — 1160F RVW MEDS BY RX/DR IN RCRD: CPT | Mod: CPTII,S$GLB,, | Performed by: NURSE PRACTITIONER

## 2024-07-19 PROCEDURE — 3008F BODY MASS INDEX DOCD: CPT | Mod: CPTII,S$GLB,, | Performed by: INTERNAL MEDICINE

## 2024-07-19 PROCEDURE — 1101F PT FALLS ASSESS-DOCD LE1/YR: CPT | Mod: CPTII,S$GLB,, | Performed by: NURSE PRACTITIONER

## 2024-07-19 PROCEDURE — 3078F DIAST BP <80 MM HG: CPT | Mod: CPTII,S$GLB,, | Performed by: INTERNAL MEDICINE

## 2024-07-19 PROCEDURE — 1159F MED LIST DOCD IN RCRD: CPT | Mod: CPTII,S$GLB,, | Performed by: INTERNAL MEDICINE

## 2024-07-19 PROCEDURE — 1111F DSCHRG MED/CURRENT MED MERGE: CPT | Mod: CPTII,S$GLB,, | Performed by: INTERNAL MEDICINE

## 2024-07-19 PROCEDURE — 3066F NEPHROPATHY DOC TX: CPT | Mod: CPTII,S$GLB,, | Performed by: NURSE PRACTITIONER

## 2024-07-19 PROCEDURE — 1101F PT FALLS ASSESS-DOCD LE1/YR: CPT | Mod: CPTII,S$GLB,, | Performed by: INTERNAL MEDICINE

## 2024-07-19 PROCEDURE — 3060F POS MICROALBUMINURIA REV: CPT | Mod: CPTII,S$GLB,, | Performed by: INTERNAL MEDICINE

## 2024-07-19 PROCEDURE — 3060F POS MICROALBUMINURIA REV: CPT | Mod: CPTII,S$GLB,, | Performed by: NURSE PRACTITIONER

## 2024-07-19 PROCEDURE — 99214 OFFICE O/P EST MOD 30 MIN: CPT | Mod: S$GLB,,, | Performed by: INTERNAL MEDICINE

## 2024-07-19 PROCEDURE — 3066F NEPHROPATHY DOC TX: CPT | Mod: CPTII,S$GLB,, | Performed by: INTERNAL MEDICINE

## 2024-07-19 PROCEDURE — 3075F SYST BP GE 130 - 139MM HG: CPT | Mod: CPTII,S$GLB,, | Performed by: INTERNAL MEDICINE

## 2024-07-19 PROCEDURE — 1111F DSCHRG MED/CURRENT MED MERGE: CPT | Mod: CPTII,S$GLB,, | Performed by: NURSE PRACTITIONER

## 2024-07-19 PROCEDURE — 1159F MED LIST DOCD IN RCRD: CPT | Mod: CPTII,S$GLB,, | Performed by: NURSE PRACTITIONER

## 2024-07-19 NOTE — PROGRESS NOTES
"Subjective:       Patient ID: Annika Mac is a 73 y.o. female.    Chief Complaint: Follow-up (Er F/u )  This is a 73-year-old who presents today for ER follow-up out since her last visit she was seen a week ago when she was hospitalized after developing colitis which was found to be due to Campylobacter she completed a course of azithromycin and was having some improvement in her GI symptoms she still has a bit of irritability but mostly the diarrhea is letting up she completed that medicine and at that time her blood pressure was running a bit low so she reduced her amlodipine to 5 mg as we had discussed and her pressures were doing better she he had a grape [employer] frozen cup and reports later that evening developed some burning and discomfort in her tongue.  She ultimately developed swelling in the lower lip and under the tongue she went to the ER for evaluation they evaluated thought she might have angioedema she was given medication and Benadryl steroid with resolution of her symptoms she reports still a little irritation under the tongue but no further swelling at that time they held her irbesartan and she has been holding it since that time and taking the amlodipine 5 half of a 10 mg tablet so far blood pressure still has been running okay she has had no recurrence in his symptoms does not recall any previous similar issues    Follow-up  Pertinent negatives include no fever.     Review of Systems   Constitutional:  Negative for fever.   HENT:          Some irritation  But no further swelling/tongue or lip    Gastrointestinal:         Cramps at times  Diarrhea improved        Objective:    Blood pressure (!) 130/56, pulse 61, height 5' 5" (1.651 m), weight 132.8 kg (292 lb 12.3 oz), SpO2 100%.   Physical Exam  Constitutional:       General: She is not in acute distress.  HENT:      Head: Normocephalic.      Mouth/Throat:      Pharynx: Oropharynx is clear.   Eyes:      General: No scleral " icterus.  Cardiovascular:      Rate and Rhythm: Normal rate and regular rhythm.      Heart sounds: Murmur heard.      No friction rub. No gallop.   Pulmonary:      Effort: Pulmonary effort is normal. No respiratory distress.      Breath sounds: Normal breath sounds.   Abdominal:      General: Bowel sounds are normal.      Palpations: Abdomen is soft. There is no mass.      Tenderness: There is no abdominal tenderness.   Musculoskeletal:      Cervical back: Neck supple.   Skin:     Findings: No erythema.   Neurological:      Mental Status: She is alert.   Psychiatric:         Mood and Affect: Mood normal.         Assessment:       1. Angioedema, sequela    2. Type 2 diabetes mellitus with both eyes affected by proliferative retinopathy and macular edema, with long-term current use of insulin    3. Essential hypertension    4. Campylobacter diarrhea        Plan:       Annika was seen today for follow-up.    Diagnoses and all orders for this visit:    Angioedema, sequela  Discussed with patient recent episode of lip and tongue swelling possible angioedema versus allergic reaction discussed with patient she is currently holding her irbesartan and can continue to do so for now what I would like her to see allergist to see if they feel consistent with that versus antibiotic she was taking or other allergy that may contribute she will avoid eating the thing that she ate prior to the symptoms developing as well to be cautious  -     Ambulatory referral/consult to Allergy; Future    Type 2 diabetes mellitus with both eyes affected by proliferative retinopathy and macular edema, with long-term current use of insulin  Has had improvement overall    Essential hypertension  Improved she has been losing weight and her pressure was trending down she is currently taking a half of her 10 mg amlodipine and holding her irbesartan for now she can continue to do so she will go back up to 10 mg of amlodipine if her blood pressure trends  back up for now    Campylobacter diarrhea  Continues to improve she can use her Bentyl if needed for cramping hold on Imodium    Visit today included increased complexity associated with the care of the episodic problem angioedema, type 2 diabetes, hypertensin, campylobacter diarrhea  addressed and managing the longitudinal care of the patient due to the serious and/or complex managed problems.

## 2024-07-22 ENCOUNTER — PATIENT MESSAGE (OUTPATIENT)
Dept: PODIATRY | Facility: CLINIC | Age: 74
End: 2024-07-22
Payer: MEDICARE

## 2024-07-24 ENCOUNTER — TELEPHONE (OUTPATIENT)
Dept: GASTROENTEROLOGY | Facility: CLINIC | Age: 74
End: 2024-07-24
Payer: MEDICARE

## 2024-07-24 ENCOUNTER — OFFICE VISIT (OUTPATIENT)
Dept: PODIATRY | Facility: CLINIC | Age: 74
End: 2024-07-24
Payer: MEDICARE

## 2024-07-24 VITALS
BODY MASS INDEX: 48.82 KG/M2 | DIASTOLIC BLOOD PRESSURE: 68 MMHG | SYSTOLIC BLOOD PRESSURE: 144 MMHG | HEART RATE: 72 BPM | WEIGHT: 293 LBS | HEIGHT: 65 IN

## 2024-07-24 DIAGNOSIS — E11.42 DIABETIC POLYNEUROPATHY ASSOCIATED WITH TYPE 2 DIABETES MELLITUS: Primary | ICD-10-CM

## 2024-07-24 DIAGNOSIS — B35.1 ONYCHOMYCOSIS DUE TO DERMATOPHYTE: ICD-10-CM

## 2024-07-24 DIAGNOSIS — L84 CORN OR CALLUS: ICD-10-CM

## 2024-07-24 PROCEDURE — 99499 UNLISTED E&M SERVICE: CPT | Mod: S$GLB,,, | Performed by: PODIATRIST

## 2024-07-24 PROCEDURE — 11057 PARNG/CUTG B9 HYPRKR LES >4: CPT | Mod: Q9,S$GLB,, | Performed by: PODIATRIST

## 2024-07-24 PROCEDURE — 11721 DEBRIDE NAIL 6 OR MORE: CPT | Mod: Q9,59,S$GLB, | Performed by: PODIATRIST

## 2024-07-24 PROCEDURE — 99999 PR PBB SHADOW E&M-EST. PATIENT-LVL IV: CPT | Mod: PBBFAC,,, | Performed by: PODIATRIST

## 2024-07-24 NOTE — TELEPHONE ENCOUNTER
MA Spoke to patient, she wanted provider in main campus, scheduled next available provider in [facility].

## 2024-07-24 NOTE — PROGRESS NOTES
Subjective:      Patient ID: Annika Mac is a 73 y.o. female.    Chief Complaint: Diabetic Foot Exam (7/19/24 - Mamie Cotton MD, PCP)      Annika is a 73 y.o. female who presents to the clinic for evaluation and treatment of high risk feet. Annika has a past medical history of Asthma, Chronic obstructive pulmonary disease, unspecified COPD type (1/14/2022), Constipation (10/3/2019), Deep vein thrombophlebitis of left leg (7/27/2012), Deep vein thrombosis, Diabetic foot ulcer with osteomyelitis, Encounter for blood transfusion, GERD (gastroesophageal reflux disease), Obesity, diabetes, and hypertension syndrome (2/13/2019), Obstructive sleep apnea (7/27/2012), PAD (peripheral artery disease) (11/21/2013), Pressure ulcer of toe of left foot, Type 2 diabetes mellitus with obesity (8/19/2020), and Type 2 diabetes mellitus with peripheral artery disease (8/19/2020). The patient's chief complaint is long, thick toenails. This patient has documented high risk feet requiring routine maintenance secondary to peripheral neuropathy.    PCP: Mamie Cotton MD    Date Last Seen by PCP:   Chief Complaint   Patient presents with    Diabetic Foot Exam     7/19/24 - Mamie Cotton MD, PCP         Current shoe gear:  Affected Foot: Rx diabetic extra depth shoes and custom accommodative insoles     Unaffected Foot: Rx diabetic extra depth shoes and custom accommodative insoles    Hemoglobin A1C   Date Value Ref Range Status   03/05/2024 7.0 (H) 4.0 - 5.6 % Final     Comment:     ADA Screening Guidelines:  5.7-6.4%  Consistent with prediabetes  >or=6.5%  Consistent with diabetes    High levels of fetal hemoglobin interfere with the HbA1C  assay. Heterozygous hemoglobin variants (HbS, HgC, etc)do  not significantly interfere with this assay.   However, presence of multiple variants may affect accuracy.     10/24/2023 7.5 (H) 4.0 - 5.6 % Final     Comment:     ADA Screening Guidelines:  5.7-6.4%   Consistent with prediabetes  >or=6.5%  Consistent with diabetes    High levels of fetal hemoglobin interfere with the HbA1C  assay. Heterozygous hemoglobin variants (HbS, HgC, etc)do  not significantly interfere with this assay.   However, presence of multiple variants may affect accuracy.     01/05/2023 7.7 (H) 4.0 - 5.6 % Final     Comment:     ADA Screening Guidelines:  5.7-6.4%  Consistent with prediabetes  >or=6.5%  Consistent with diabetes    High levels of fetal hemoglobin interfere with the HbA1C  assay. Heterozygous hemoglobin variants (HbS, HgC, etc)do  not significantly interfere with this assay.   However, presence of multiple variants may affect accuracy.         Review of Systems   Constitutional: Negative for chills, fever and malaise/fatigue.   HENT:  Negative for hearing loss.    Cardiovascular:  Positive for leg swelling. Negative for claudication.   Respiratory:  Negative for shortness of breath.    Skin:  Positive for color change, dry skin, nail changes and unusual hair distribution. Negative for flushing and rash.   Musculoskeletal:  Negative for joint pain and myalgias.   Neurological:  Positive for numbness, paresthesias and sensory change. Negative for loss of balance.   Psychiatric/Behavioral:  Negative for altered mental status.            Objective:      Physical Exam  Vitals reviewed.   Constitutional:       Appearance: She is well-developed.   Cardiovascular:      Pulses:           Dorsalis pedis pulses are 0 on the right side and 0 on the left side.        Posterior tibial pulses are 1+ on the right side and 1+ on the left side.   Musculoskeletal:      Right lower leg: Edema present.      Left lower leg: Edema present.      Right ankle: Decreased range of motion. Abnormal pulse.      Right Achilles Tendon: Patel's test negative.      Left ankle: Decreased range of motion. Abnormal pulse.      Left Achilles Tendon: Patel's test negative.      Right foot: Decreased range of motion.       Left foot: Decreased range of motion.   Feet:      Right foot:      Protective Sensation: 5 sites tested.  2 sites sensed.      Left foot:      Protective Sensation: 5 sites tested.  2 sites sensed.   Skin:     General: Skin is warm and dry.      Capillary Refill: Capillary refill takes more than 3 seconds.      Comments: Nails x10 are elongated by 4-6mm's, thickened by 2-4 mm's, dystrophic, and are yellowish in  coloration . Xerosis Bilaterally. No open lesions noted.   HKLs noted to b/L 1st met, distal 2nd and 3rd digits b/L   Neurological:      Mental Status: She is alert.      Comments: diminished sensation noted to b/L lower extremities   Psychiatric:         Behavior: Behavior normal. Behavior is cooperative.               Assessment:       Encounter Diagnoses   Name Primary?    Diabetic polyneuropathy associated with type 2 diabetes mellitus Yes    Corn or callus     Onychomycosis due to dermatophyte          Plan:       Annika was seen today for diabetic foot exam.    Diagnoses and all orders for this visit:    Diabetic polyneuropathy associated with type 2 diabetes mellitus    Corn or callus    Onychomycosis due to dermatophyte      I counseled the patient on her conditions, their implications and medical management.      - Shoe inspection. Diabetic Foot Education. Patient reminded of the importance of good nutrition and blood sugar control to help prevent podiatric complications of diabetes. Patient instructed on proper foot hygeine. We discussed wearing proper shoe gear, daily foot inspections, never walking without protective shoe gear, never putting sharp instruments to feet, routine podiatric nail visits every 2-3 months.    After cleansing with an alcohol prep pad, the about mentioned hyperkeratotic lesions were sharply debrided X 6 utilizing a #15 blade to a smooth base without incident. Pt tolerated the procedure well and reported comfort to the debarment sites. Pt will continue to use padding  and moisture the callused areas.     - With patient's permission, nails were aggressively reduced and debrided x 10 to their soft tissue attachment mechanically and with electric , removing all offending nail and debris. Patient relates relief following the procedure. She will continue to monitor the areas daily, inspect her feet, wear protective shoe gear when ambulatory, moisturizer to maintain skin integrity and follow in this office in approximately 2-3 months, sooner p.r.n.

## 2024-07-24 NOTE — TELEPHONE ENCOUNTER
"----- Message from Katie Sinclair sent at 7/24/2024  3:59 PM CDT -----  Regarding: appt access  Contact: [phone]  .Name Of Caller: Self     Contact Preference?:  [phone]  What is the nature of the call?: calling in reference to scheduling appt from referral due to Colitis. Pls call      Additional Notes:  "Thank you for all that you do for our patients"  "

## 2024-07-31 ENCOUNTER — PATIENT MESSAGE (OUTPATIENT)
Dept: RESEARCH | Facility: HOSPITAL | Age: 74
End: 2024-07-31
Payer: MEDICARE

## 2024-08-12 ENCOUNTER — OFFICE VISIT (OUTPATIENT)
Dept: ALLERGY | Facility: CLINIC | Age: 74
End: 2024-08-12
Payer: MEDICARE

## 2024-08-12 ENCOUNTER — LAB VISIT (OUTPATIENT)
Dept: LAB | Facility: HOSPITAL | Age: 74
End: 2024-08-12
Payer: MEDICARE

## 2024-08-12 VITALS — BODY MASS INDEX: 48.74 KG/M2 | WEIGHT: 292.56 LBS | HEIGHT: 65 IN

## 2024-08-12 DIAGNOSIS — T78.3XXS ANGIOEDEMA, SEQUELA: ICD-10-CM

## 2024-08-12 LAB — C4 SERPL-MCNC: 39 MG/DL (ref 11–44)

## 2024-08-12 PROCEDURE — 3051F HG A1C>EQUAL 7.0%<8.0%: CPT | Mod: CPTII,S$GLB,, | Performed by: ALLERGY & IMMUNOLOGY

## 2024-08-12 PROCEDURE — 1159F MED LIST DOCD IN RCRD: CPT | Mod: CPTII,S$GLB,, | Performed by: ALLERGY & IMMUNOLOGY

## 2024-08-12 PROCEDURE — 86160 COMPLEMENT ANTIGEN: CPT | Performed by: ALLERGY & IMMUNOLOGY

## 2024-08-12 PROCEDURE — 3060F POS MICROALBUMINURIA REV: CPT | Mod: CPTII,S$GLB,, | Performed by: ALLERGY & IMMUNOLOGY

## 2024-08-12 PROCEDURE — 1126F AMNT PAIN NOTED NONE PRSNT: CPT | Mod: CPTII,S$GLB,, | Performed by: ALLERGY & IMMUNOLOGY

## 2024-08-12 PROCEDURE — 36415 COLL VENOUS BLD VENIPUNCTURE: CPT | Performed by: ALLERGY & IMMUNOLOGY

## 2024-08-12 PROCEDURE — 86003 ALLG SPEC IGE CRUDE XTRC EA: CPT | Performed by: ALLERGY & IMMUNOLOGY

## 2024-08-12 PROCEDURE — 3066F NEPHROPATHY DOC TX: CPT | Mod: CPTII,S$GLB,, | Performed by: ALLERGY & IMMUNOLOGY

## 2024-08-12 PROCEDURE — 3008F BODY MASS INDEX DOCD: CPT | Mod: CPTII,S$GLB,, | Performed by: ALLERGY & IMMUNOLOGY

## 2024-08-12 PROCEDURE — 4010F ACE/ARB THERAPY RXD/TAKEN: CPT | Mod: CPTII,S$GLB,, | Performed by: ALLERGY & IMMUNOLOGY

## 2024-08-12 PROCEDURE — 99999 PR PBB SHADOW E&M-EST. PATIENT-LVL V: CPT | Mod: PBBFAC,,, | Performed by: ALLERGY & IMMUNOLOGY

## 2024-08-12 PROCEDURE — 86003 ALLG SPEC IGE CRUDE XTRC EA: CPT | Mod: 59 | Performed by: ALLERGY & IMMUNOLOGY

## 2024-08-12 PROCEDURE — 99204 OFFICE O/P NEW MOD 45 MIN: CPT | Mod: S$GLB,,, | Performed by: ALLERGY & IMMUNOLOGY

## 2024-08-12 NOTE — PROGRESS NOTES
Subjective:       Patient ID: Annika Mac is a 73 y.o. female.    Chief Complaint:  Angioedema      HPI    Pt presents for evaluation of episode lower lip swelling almost 1 mo ago. The night prior ate green beans, chicken, fruit cup w cherry, grape, peach, pear around 6:30 pm.  Went to bed around 9:30/10 and was feeling fine. When woke up ~5 am noted lower lip swelling. Tongue felt slightly swollen as well. No rash, no angioedema elsewhere, no GI sx's, no resp distress. Went to ER. Noted improvement after benadryl and dexamethasone.  Irbesartan was also discontinued in ER. Swelling lasted less than 24 hours.  Swelling started more than 24 hours after finishing a course of azithromycin for colitis.  Has since tolerated chicken, cherry.   No NSAIDS or OTC meds at the time.    Daughter has hx recurrent lip swelling of uncertain cause      Past Medical History:   Diagnosis Date    Allergy     Angio-edema     Asthma     Chronic obstructive pulmonary disease, unspecified COPD type 01/14/2022    Constipation 10/03/2019    Deep vein thrombophlebitis of left leg 07/27/2012    Deep vein thrombosis     when she had a knee replacement    Diabetic foot ulcer with osteomyelitis     Encounter for blood transfusion     anemic     GERD (gastroesophageal reflux disease)     Obesity, diabetes, and hypertension syndrome 02/13/2019    Obstructive sleep apnea 07/27/2012    PAD (peripheral artery disease) 11/21/2013    Pressure ulcer of toe of left foot     Type 2 diabetes mellitus with obesity 08/19/2020    Type 2 diabetes mellitus with peripheral artery disease 08/19/2020       Family History   Problem Relation Name Age of Onset    Diabetes Mother      Hypertension Mother      Heart disease Father      Diabetes Sister X4     No Known Problems Sister unknown hx     No Known Problems Brother X2-unknown hx     Thyroid disease Brother X4     Diabetes Brother X4     Hypertension Brother X4     No Known Problems Daughter      No Known  Problems Daughter      No Known Problems Son      Amblyopia Neg Hx      Blindness Neg Hx      Glaucoma Neg Hx      Macular degeneration Neg Hx      Retinal detachment Neg Hx      Strabismus Neg Hx      Colon cancer Neg Hx      Esophageal cancer Neg Hx           Review of Systems   Constitutional:  Negative for activity change, fatigue and fever.   HENT:  Negative for congestion, postnasal drip, rhinorrhea, sinus pressure and sneezing.    Eyes:  Negative for discharge, redness and itching.   Respiratory:  Negative for cough, shortness of breath and wheezing.    Cardiovascular:  Negative for chest pain.   Gastrointestinal:  Negative for diarrhea, nausea and vomiting.   Genitourinary:  Negative for dysuria.   Musculoskeletal:  Negative for arthralgias and joint swelling.   Skin:  Negative for rash.   Neurological:  Negative for headaches.   Hematological:  Does not bruise/bleed easily.   Psychiatric/Behavioral:  Negative for behavioral problems and sleep disturbance.         Objective:   Physical Exam  Vitals and nursing note reviewed.   Constitutional:       General: She is not in acute distress.     Appearance: She is well-developed.   HENT:      Head: Normocephalic.      Right Ear: External ear normal.      Left Ear: External ear normal.      Nose: No septal deviation, mucosal edema or rhinorrhea.      Right Sinus: No maxillary sinus tenderness or frontal sinus tenderness.      Left Sinus: No maxillary sinus tenderness or frontal sinus tenderness.      Mouth/Throat:      Pharynx: Uvula midline. No uvula swelling.   Eyes:      General:         Right eye: No discharge.         Left eye: No discharge.   Cardiovascular:      Rate and Rhythm: Normal rate.   Pulmonary:      Effort: Pulmonary effort is normal. No respiratory distress.      Breath sounds: No wheezing.   Abdominal:      General: Bowel sounds are normal. There is no distension.   Musculoskeletal:      Cervical back: Normal range of motion.   Skin:      "Findings: No erythema or rash.   Neurological:      Mental Status: She is alert and oriented to person, place, and time.   Psychiatric:         Behavior: Behavior normal.           No results found for: "IGGSERUM", "IGM", "IGA", "IGE"  Eos #   Date Value Ref Range Status   07/01/2024 0.1 0.0 - 0.5 K/uL Final   06/20/2024 0.3 0.0 - 0.5 K/uL Final   11/20/2023 0.3 0.0 - 0.5 K/uL Final     Eosinophil %   Date Value Ref Range Status   07/01/2024 0.6 0.0 - 8.0 % Final   06/20/2024 4.5 0.0 - 8.0 % Final   11/20/2023 5.5 0.0 - 8.0 % Final     Pneumococcal titers:  No results found. However, due to the size of the patient record, not all encounters were searched. Please check Results Review for a complete set of results.       Assessment:       1. Angioedema, sequela     Of lip     Plan:       Annika was seen today for angioedema.    Diagnoses and all orders for this visit:    Angioedema, sequela  -     C4 Complement; Future  -     ALLERGEN - PEAR; Future  -     ALLERGEN PEACH IGE; Future  -     Allergen-Grape; Future  -     Allergen-Green [place]; Future      C4 to eval for ayan HAE (daughter w hx recurrent angioedema)  Check selected food immunoCAPs, though onset of swelling >3 hours after food ingestion unusual for IgE mediated food allergy  Swelling unlikely 2/2 azithromycin finished >24 hours prior to onset of lip swelling.  While ARB causes angioedema less frequently that ACE-I, cannot r/o this possibility w/o observed challenge. Currently BP controlled off ARB. If needed for HTN, could consider reintroduction of ARB, poss observed challenge.         "

## 2024-08-14 ENCOUNTER — OFFICE VISIT (OUTPATIENT)
Dept: DERMATOLOGY | Facility: CLINIC | Age: 74
End: 2024-08-14
Payer: MEDICARE

## 2024-08-14 ENCOUNTER — OFFICE VISIT (OUTPATIENT)
Dept: HEMATOLOGY/ONCOLOGY | Facility: CLINIC | Age: 74
End: 2024-08-14
Payer: MEDICARE

## 2024-08-14 ENCOUNTER — LAB VISIT (OUTPATIENT)
Dept: LAB | Facility: HOSPITAL | Age: 74
End: 2024-08-14
Payer: MEDICARE

## 2024-08-14 VITALS
HEART RATE: 70 BPM | SYSTOLIC BLOOD PRESSURE: 123 MMHG | BODY MASS INDEX: 48.74 KG/M2 | WEIGHT: 292.56 LBS | HEIGHT: 65 IN | DIASTOLIC BLOOD PRESSURE: 58 MMHG | TEMPERATURE: 98 F | OXYGEN SATURATION: 97 %

## 2024-08-14 DIAGNOSIS — L66.9 CICATRICIAL ALOPECIA: Primary | ICD-10-CM

## 2024-08-14 DIAGNOSIS — D63.1 ANEMIA IN CHRONIC KIDNEY DISEASE, UNSPECIFIED CKD STAGE: Primary | ICD-10-CM

## 2024-08-14 DIAGNOSIS — N18.9 ANEMIA IN CHRONIC KIDNEY DISEASE, UNSPECIFIED CKD STAGE: Primary | ICD-10-CM

## 2024-08-14 DIAGNOSIS — L81.8 POST INFLAMMATORY HYPOPIGMENTATION: ICD-10-CM

## 2024-08-14 DIAGNOSIS — N18.30 ANEMIA IN STAGE 3 CHRONIC KIDNEY DISEASE, UNSPECIFIED WHETHER STAGE 3A OR 3B CKD: ICD-10-CM

## 2024-08-14 DIAGNOSIS — D63.1 ANEMIA IN STAGE 3 CHRONIC KIDNEY DISEASE, UNSPECIFIED WHETHER STAGE 3A OR 3B CKD: ICD-10-CM

## 2024-08-14 LAB
ERYTHROCYTE [DISTWIDTH] IN BLOOD BY AUTOMATED COUNT: 15.6 % (ref 11.5–14.5)
GRAPE IGE QN: <0.1 KU/L
GREEN BEAN IGE QN: <0.1 KU/L
HCT VFR BLD AUTO: 35.8 % (ref 37–48.5)
HGB BLD-MCNC: 10.8 G/DL (ref 12–16)
IMM GRANULOCYTES # BLD AUTO: 0.01 K/UL (ref 0–0.04)
MCH RBC QN AUTO: 26.8 PG (ref 27–31)
MCHC RBC AUTO-ENTMCNC: 30.2 G/DL (ref 32–36)
MCV RBC AUTO: 89 FL (ref 82–98)
NEUTROPHILS # BLD AUTO: 3.3 K/UL (ref 1.8–7.7)
PEACH IGE QN: <0.1 KU/L
PEAR IGE QN: <0.1 KU/L
PLATELET # BLD AUTO: 231 K/UL (ref 150–450)
PMV BLD AUTO: 11.6 FL (ref 9.2–12.9)
RAST CLASS: NORMAL
RBC # BLD AUTO: 4.03 M/UL (ref 4–5.4)
WBC # BLD AUTO: 5.57 K/UL (ref 3.9–12.7)

## 2024-08-14 PROCEDURE — 36415 COLL VENOUS BLD VENIPUNCTURE: CPT | Performed by: PHYSICIAN ASSISTANT

## 2024-08-14 PROCEDURE — 1159F MED LIST DOCD IN RCRD: CPT | Mod: CPTII,S$GLB,, | Performed by: INTERNAL MEDICINE

## 2024-08-14 PROCEDURE — 3060F POS MICROALBUMINURIA REV: CPT | Mod: CPTII,S$GLB,, | Performed by: INTERNAL MEDICINE

## 2024-08-14 PROCEDURE — 3008F BODY MASS INDEX DOCD: CPT | Mod: CPTII,S$GLB,, | Performed by: INTERNAL MEDICINE

## 2024-08-14 PROCEDURE — 3051F HG A1C>EQUAL 7.0%<8.0%: CPT | Mod: CPTII,S$GLB,, | Performed by: INTERNAL MEDICINE

## 2024-08-14 PROCEDURE — 99999 PR PBB SHADOW E&M-EST. PATIENT-LVL V: CPT | Mod: PBBFAC,,, | Performed by: INTERNAL MEDICINE

## 2024-08-14 PROCEDURE — 1101F PT FALLS ASSESS-DOCD LE1/YR: CPT | Mod: CPTII,S$GLB,, | Performed by: STUDENT IN AN ORGANIZED HEALTH CARE EDUCATION/TRAINING PROGRAM

## 2024-08-14 PROCEDURE — 1126F AMNT PAIN NOTED NONE PRSNT: CPT | Mod: CPTII,S$GLB,, | Performed by: STUDENT IN AN ORGANIZED HEALTH CARE EDUCATION/TRAINING PROGRAM

## 2024-08-14 PROCEDURE — 3051F HG A1C>EQUAL 7.0%<8.0%: CPT | Mod: CPTII,S$GLB,, | Performed by: STUDENT IN AN ORGANIZED HEALTH CARE EDUCATION/TRAINING PROGRAM

## 2024-08-14 PROCEDURE — 85027 COMPLETE CBC AUTOMATED: CPT | Performed by: PHYSICIAN ASSISTANT

## 2024-08-14 PROCEDURE — 99214 OFFICE O/P EST MOD 30 MIN: CPT | Mod: S$GLB,,, | Performed by: STUDENT IN AN ORGANIZED HEALTH CARE EDUCATION/TRAINING PROGRAM

## 2024-08-14 PROCEDURE — 3066F NEPHROPATHY DOC TX: CPT | Mod: CPTII,S$GLB,, | Performed by: INTERNAL MEDICINE

## 2024-08-14 PROCEDURE — 1159F MED LIST DOCD IN RCRD: CPT | Mod: CPTII,S$GLB,, | Performed by: STUDENT IN AN ORGANIZED HEALTH CARE EDUCATION/TRAINING PROGRAM

## 2024-08-14 PROCEDURE — 4010F ACE/ARB THERAPY RXD/TAKEN: CPT | Mod: CPTII,S$GLB,, | Performed by: INTERNAL MEDICINE

## 2024-08-14 PROCEDURE — 3288F FALL RISK ASSESSMENT DOCD: CPT | Mod: CPTII,S$GLB,, | Performed by: STUDENT IN AN ORGANIZED HEALTH CARE EDUCATION/TRAINING PROGRAM

## 2024-08-14 PROCEDURE — 1160F RVW MEDS BY RX/DR IN RCRD: CPT | Mod: CPTII,S$GLB,, | Performed by: STUDENT IN AN ORGANIZED HEALTH CARE EDUCATION/TRAINING PROGRAM

## 2024-08-14 PROCEDURE — G2211 COMPLEX E/M VISIT ADD ON: HCPCS | Mod: S$GLB,,, | Performed by: STUDENT IN AN ORGANIZED HEALTH CARE EDUCATION/TRAINING PROGRAM

## 2024-08-14 PROCEDURE — 3078F DIAST BP <80 MM HG: CPT | Mod: CPTII,S$GLB,, | Performed by: INTERNAL MEDICINE

## 2024-08-14 PROCEDURE — 1126F AMNT PAIN NOTED NONE PRSNT: CPT | Mod: CPTII,S$GLB,, | Performed by: INTERNAL MEDICINE

## 2024-08-14 PROCEDURE — 1101F PT FALLS ASSESS-DOCD LE1/YR: CPT | Mod: CPTII,S$GLB,, | Performed by: INTERNAL MEDICINE

## 2024-08-14 PROCEDURE — 3074F SYST BP LT 130 MM HG: CPT | Mod: CPTII,S$GLB,, | Performed by: INTERNAL MEDICINE

## 2024-08-14 PROCEDURE — 3066F NEPHROPATHY DOC TX: CPT | Mod: CPTII,S$GLB,, | Performed by: STUDENT IN AN ORGANIZED HEALTH CARE EDUCATION/TRAINING PROGRAM

## 2024-08-14 PROCEDURE — 99999 PR PBB SHADOW E&M-EST. PATIENT-LVL III: CPT | Mod: PBBFAC,,, | Performed by: STUDENT IN AN ORGANIZED HEALTH CARE EDUCATION/TRAINING PROGRAM

## 2024-08-14 PROCEDURE — 4010F ACE/ARB THERAPY RXD/TAKEN: CPT | Mod: CPTII,S$GLB,, | Performed by: STUDENT IN AN ORGANIZED HEALTH CARE EDUCATION/TRAINING PROGRAM

## 2024-08-14 PROCEDURE — 99214 OFFICE O/P EST MOD 30 MIN: CPT | Mod: S$GLB,,, | Performed by: INTERNAL MEDICINE

## 2024-08-14 PROCEDURE — 3060F POS MICROALBUMINURIA REV: CPT | Mod: CPTII,S$GLB,, | Performed by: STUDENT IN AN ORGANIZED HEALTH CARE EDUCATION/TRAINING PROGRAM

## 2024-08-14 PROCEDURE — 3288F FALL RISK ASSESSMENT DOCD: CPT | Mod: CPTII,S$GLB,, | Performed by: INTERNAL MEDICINE

## 2024-08-14 RX ORDER — CLOBETASOL PROPIONATE 0.5 MG/ML
SOLUTION TOPICAL
Qty: 50 ML | Refills: 3 | Status: SHIPPED | OUTPATIENT
Start: 2024-08-14

## 2024-08-14 RX ORDER — CICLOPIROX 1 G/100ML
SHAMPOO TOPICAL
Qty: 120 ML | Refills: 3 | Status: SHIPPED | OUTPATIENT
Start: 2024-08-14

## 2024-08-14 NOTE — PATIENT INSTRUCTIONS
Central centrifugal cicatricial alopecia (CCCA)    this is a chronic condition that typically progresses with time and may leave behind scarring- areas where hair more difficult to regrow- so early treatments important. Etiology likely multifactorial, but we think there may be genetic predisposition. Reviewed primary goal of treatment is to stop progresssion/stabilize and treat any symptoms. Secondary goal is regrowth of lost hair, but counseled that regrowth takes time/consistent treatment at least 6-12 months to see results of treatment.   Encourage natural hairstyles free of chemicals, heat and avoiding glue for wigs    Plan:  Consider serial steroid injections in office  Start minoxidil 5% solution or foam daily (men's strength) cheapest in bulk [employer] or Flagr or [employer]  Start clobetasol solution to scalp daily  Start ciclopirox shampoo to scalp once weekly  Start Vitamin D 2000 IU daily (over the counter)

## 2024-08-14 NOTE — PROGRESS NOTES
Subjective:      Patient ID:  Annika Mac is a 73 y.o. female who presents for   Chief Complaint   Patient presents with    Hair Loss    Vitiligo     F/u     HPI  73 y.o. female presents today for hair loss.    Last office visit 6-2024 with me for lesion    Hair loss  Former pt of Dr. Curran, last een 4-  Duration: >3 years  Itching (scale 1-10): 7/10  Current tx: ketoconazole shampoo  Prior tx: Rogaine, finasteride, ketoconazole shampoo, ILK, fluocinolone oil  PMHx CKD, T2DM, HTN  Prior hx tight ebony  Loss at vertex and frontal scalp    Pertinent labs:  Lab Results   Component Value Date    TSH 1.365 09/08/2020     Lab Results   Component Value Date    WBC 5.57 08/14/2024    HGB 10.8 (L) 08/14/2024    HCT 35.8 (L) 08/14/2024    MCV 89 08/14/2024     08/14/2024       Lab Results   Component Value Date    UIBC 287 06/21/2004    IRON 45 09/13/2021    TRANSFERRIN 186 (L) 09/13/2021    TIBC 275 09/13/2021    FESATURATED 16 (L) 09/13/2021 5-2024 ferritin 374   Lab Results   Component Value Date    TXSYFTFL15BL 47 09/13/2021       Review of Systems    Objective:   Physical Exam     Diagram Legend     Erythematous scaling macule/papule c/w actinic keratosis       Vascular papule c/w angioma      Pigmented verrucoid papule/plaque c/w seborrheic keratosis      Yellow umbilicated papule c/w sebaceous hyperplasia      Irregularly shaped tan macule c/w lentigo     1-2 mm smooth white papules consistent with Milia      Movable subcutaneous cyst with punctum c/w epidermal inclusion cyst      Subcutaneous movable cyst c/w pilar cyst      Firm pink to brown papule c/w dermatofibroma      Pedunculated fleshy papule(s) c/w skin tag(s)      Evenly pigmented macule c/w junctional nevus     Mildly variegated pigmented, slightly irregular-bordered macule c/w mildly atypical nevus      Flesh colored to evenly pigmented papule c/w intradermal nevus       Pink pearly papule/plaque c/w basal cell carcinoma       Erythematous hyperkeratotic cursted plaque c/w SCC      Surgical scar with no sign of skin cancer recurrence      Open and closed comedones      Inflammatory papules and pustules      Verrucoid papule consistent consistent with wart     Erythematous eczematous patches and plaques     Dystrophic onycholytic nail with subungual debris c/w onychomycosis     Umbilicated papule    Erythematous-base heme-crusted tan verrucoid plaque consistent with inflamed seborrheic keratosis     Erythematous Silvery Scaling Plaque c/w Psoriasis     See annotation                      Assessment / Plan:        Central centrifugal cicatricial alopecia (CCCA)  Also with component 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 Etiology likely multifactorial, but we think there may be genetic predisposition. Reviewed primary goal of treatment is to stop progresssion/stabilize and treat any symptoms. Secondary goal is regrowth of lost hair, but counseled that regrowth takes time/consistent treatment at least 6-12 months to see results of treatment and sometimes can be difficult in scarred areas.  Encourage natural hairstyles free of chemicals, heat and avoiding glue for wigs    Plan:  Serial ILK offered- pt declined  Start minoxidil 5% solution or foam daily (men's strength) cheapest in bulk [employer] or [employer] or [employer]  Start clobetasol solution to scalp daily  Start ciclopirox shampoo to scalp once weekly  Start Vitamin D 2000 IU daily (over the counter)    Offered po medications doxycycline and/or finasteride- pt declines  today due to being on many medications    Post inflammatory hypopigmentation  R wrist- unchanged since last visit  Today I favor this is steroid-induced hypopigmentation from injections for R wrist pain- hypopigmented patch located over R wrist  D/c mometasone ointment, not c/w vitiligo  Contact office for spread or worsening    RTC 3 months hair loss follow up, sooner prn

## 2024-08-14 NOTE — PROGRESS NOTES
Subjective:       Patient ID: Annika Mac is a 73 y.o. female.    Chief Complaint: No chief complaint on file.    Anemia     Mrs. Mac returns today for follow up.  I had last seen her on May 22, 2024 had arranged her to receive 3 injections of erythropoietin and see me 3 weeks later.  However, it appears that she only received an injection on the 29th of May and one on the 5th of June but none since then.  She was seen by our nurse practitioner on June 20th; her hemoglobin was 11.2 grams/dL and erythropoietin was held.  She presents today with repeat labs.      Briefly, she is a 73 year old morbidly obese female who comes today for follow up for her longstanding anemia.    Multiple stool samples in the past have been negative for occult blood.   Of note, she had an EGD, colonoscopy, and video capsule swallow 4 years ago.  A tubular adenoma was removed from her colon while her EGD had shown patchy mildly erythematous mucosa without bleeding in the gastric body.  Biopsies were negative for malignancy.  The video capsule swallow was unremarkable although it was not an optimal study.    Her CBC today shows a white count of 5,500 per cubic mm, hemoglobin 10.8 grams/deciliter, hematocrit 35.8 %, MCV 89, and platelets 231K.      A recent EGD in April 2023 was normal.  Colonoscopy showed 4 polyps that were removed. Biopsy of all 4 of them showed tubular adenomas.  As mentioned above, several stool samples have been negative for occult blood.    Review of Systems  Overall she feels fair. She again complains of her longstanding arthritic pains involving primarily her knees, her back, and her hips.  She uses a wheelchair for ambulation.  She complains of shortness of breath with exertion.  She denies any anxiety, depression, easy bruising, fevers, chills, night sweats, weight loss, nausea, vomiting, diarrhea, diplopia, blurred vision, epistaxis, hematuria, or headaches.      Objective:      Physical Exam  GENERAL: She is  alert, oriented to time, place, person, pleasant, well nourished, in no acute physical distress.  She is able to climb to the examination table with assistance.  VITAL SIGNS: Reviewed.   HEENT: Normal. There are no nasal, oral, lip, gingival, auricular, lid, conjunctival lesions. Pupils are equal, reactive to light and   accommodation and extraocular muscle movements are intact. Mucosae are moist and pink, and there is no thrush.  Maxillary teeth are missing.  NECK: Supple without JVD, adenopathy, or thyromegaly.   LUNGS: Clear to auscultation without wheezing, rales, or rhonchi.   CARDIOVASCULAR: Reveals an S1, S2, no murmurs, no rubs, no gallops.   ABDOMEN: Obese, soft, nontender, without organomegaly. Bowel sounds are present. A median abdominal scar is seen extending from the umbilicus to the symphysis pubis.   EXTREMITIES: No cyanosis or clubbing. There is 1(+) edema at the ankle level bilaterally.  SKIN: Does not have petechiae, rashes, induration, or ecchymoses.   NEUROLOGIC: Motor function is 5/5, DTRs are 0 to 1+ bilaterally, symmetrical, and cranial nerves within normal limits.   LYMPHATIC: There is no cervical, axillary, or supraclavicular adenopathy.       Assessment:       1. Anemia in stage 3 chronic kidney disease      2. Morbid obesity due to excess calories     3. 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 Mac.      We again discussed the option of a bone marrow biopsy but she opted to defer it for later.  I explained to her that her hemoglobin is still too high for her to be able to restart erythropoietin.  I asked her to return in 6 weeks with a repeat CBC, CMP and ferritin.  We will most likely restart erythropoietin the time of her next visit.    Her multiple questions were answered to her satisfaction.  I spent approximately 35 minutes reviewing the available records and evaluating the patient, out of which over 50% of the time was  spent face to face with the patient in counseling and coordinating this patient's care.      Route Chart for Scheduling    Med Onc Chart Routing      Follow up with physician 6 weeks. See me with labs in 6 weeks.  Please have her on the schedule for EPO that day   Follow up with KATIE    Infusion scheduling note    Injection scheduling note    Labs CBC, CMP and ferritin   Scheduling:  Preferred lab:  Lab interval:     Imaging    Pharmacy appointment    Other referrals          Supportive Plan Information  OP EPOETIN ASAF WEEKLY   Ivan Rodriguez MD   Upcoming Treatment Dates - OP EPOETIN ASAF WEEKLY    6/19/2024       Medications       epoetin asaf-epbx injection 40,000 Units    Therapy Plan Information  None

## 2024-08-20 ENCOUNTER — OFFICE VISIT (OUTPATIENT)
Dept: GASTROENTEROLOGY | Facility: CLINIC | Age: 74
End: 2024-08-20
Payer: MEDICARE

## 2024-08-20 VITALS
DIASTOLIC BLOOD PRESSURE: 64 MMHG | HEART RATE: 82 BPM | BODY MASS INDEX: 48.15 KG/M2 | SYSTOLIC BLOOD PRESSURE: 136 MMHG | WEIGHT: 289 LBS | HEIGHT: 65 IN

## 2024-08-20 DIAGNOSIS — A04.5 CAMPYLOBACTER DIARRHEA: Primary | ICD-10-CM

## 2024-08-20 PROCEDURE — 3078F DIAST BP <80 MM HG: CPT | Mod: CPTII,S$GLB,,

## 2024-08-20 PROCEDURE — 1126F AMNT PAIN NOTED NONE PRSNT: CPT | Mod: CPTII,S$GLB,,

## 2024-08-20 PROCEDURE — 3051F HG A1C>EQUAL 7.0%<8.0%: CPT | Mod: CPTII,S$GLB,,

## 2024-08-20 PROCEDURE — 1159F MED LIST DOCD IN RCRD: CPT | Mod: CPTII,S$GLB,,

## 2024-08-20 PROCEDURE — 4010F ACE/ARB THERAPY RXD/TAKEN: CPT | Mod: CPTII,S$GLB,,

## 2024-08-20 PROCEDURE — 3288F FALL RISK ASSESSMENT DOCD: CPT | Mod: CPTII,S$GLB,,

## 2024-08-20 PROCEDURE — 3060F POS MICROALBUMINURIA REV: CPT | Mod: CPTII,S$GLB,,

## 2024-08-20 PROCEDURE — 3066F NEPHROPATHY DOC TX: CPT | Mod: CPTII,S$GLB,,

## 2024-08-20 PROCEDURE — 3075F SYST BP GE 130 - 139MM HG: CPT | Mod: CPTII,S$GLB,,

## 2024-08-20 PROCEDURE — 99214 OFFICE O/P EST MOD 30 MIN: CPT | Mod: S$GLB,,,

## 2024-08-20 PROCEDURE — 1101F PT FALLS ASSESS-DOCD LE1/YR: CPT | Mod: CPTII,S$GLB,,

## 2024-08-20 PROCEDURE — 3008F BODY MASS INDEX DOCD: CPT | Mod: CPTII,S$GLB,,

## 2024-08-20 PROCEDURE — 99999 PR PBB SHADOW E&M-EST. PATIENT-LVL V: CPT | Mod: PBBFAC,,,

## 2024-08-20 NOTE — PATIENT INSTRUCTIONS
--Miralax once daily for 3-4 days. Can decrease frequency when stools are soft and easy to pass.     [facility]  High Fiber Diet    20-30 grams of fiber per day is recommended    Fiber cereal = 5 grams (Raisin Bran, Shredded Wheat, Grape Nuts)  Konsyl 1 teaspoon = 6 grams  Metamucil 1 tablespoon = 3 grams  Citrucel 1 tablespoon = 2 grams  Fiber Choice = 3-4 per day    Drink at least 4-5 glasses of liquids per day or the fiber can be constipating rather then stimulating to your gut.  Boil and bake potatoes in their skin. Eat the skin, too.  Include fresh fruits and raw vegetables in your daily diet. Raw fruits and vegetables have more useful fiber than those that have been peeled, cooked, pureed, or otherwise processed.  Eat a wide variety of fibrous foods in reasonable amounts. Increase fiber intake slowly especially if you have been on a low-fiber diet.  Eat more legumes-peas, beans, soybeans.  For snacks, try dried fruit, whole wheat and rye crackers.  Avoid instant-cook hot cereals. Use the longer cooking cereals.  Use bran whenever possible. Sprinkle it on top of cereal, mix it into mashed potatoes or hamburger meat, or use it in combination dishes such as meat loaf.   Substitute whole grain, whole wheat and bran products for white flour products.  Eat slowly and chew your food thoroughly.    Start daily fiber.  Take 1 tsp of fiber powder (psyllium or other sugar-free powder).  Mix in 8 oz of water.  Take x 3-5 days.  Then, increase fiber by 1 tsp every 3-5 days until stool is easy to pass.  Stop and continue at that dose.   Do not exceed 6 tsps/day. GOAL:  More well-formed stool (one continuous well-formed piece vs. Pellets) and minimize straining with initiation.    Foods High in Fiber    This diet furnishes adequate amounts of all the essential nutrients needed by the body and a very liberal fiber or roughage content. Roughage is indigestible fiber found in fruits, vegetables and whole grain  cereals. It provides bulk to the large intestine and, accompanied by an adequate fluid intake, is a stimulant to elimination. Regular eating and elimination habits are vital to good health.     Fruits:  Use all fruits and juices liberally; fresh, cooked, dried or canned. Eat fruit raw and with skins when possible. Have at least four servings of fruit daily including a citrus fruit and a stewed dried fruit. Hard seeds of fruits (berries, figs, Grapes, mangoes, tomatoes) etc. may be removed.    Vegetables: Use all vegetables liberally. Green leafy vegetables, such as cabbage, spinach, lettuce, broccoli, and other greens are particularly good.    Potato: As desired. Serve baked frequently and eat the skin. Other starchy foods such as rice, macaroni, etc., may be occasionally substituted. Chew popcorn well and do not eat hard kernels.    Meat, Fish, Poultry: One or two servings daily.    Eggs: One daily if you are not on a low cholesterol diet.    Milk: Include at least one-half pint daily. Whole milk or skimmed may be used.     Cereals: Use whole grain breads and cereals such as bran, bran flakes, grape nut flakes, puffed wheat, oatmeal, Wheaties, etc., as much as possible. Refined breaks and cereals are not restricted; however, they do not contain the bulk necessary for this diet.     Sugars, Sweets: Use very moderately. Depend on fruit as dessert.    Fats: Use butter or margarine as desired. Oil or dressing on salads as desired. Fried foods may be used in moderation. Nuts may be eaten if you chew them well and may be ground or finely chopped for cooking.   Sample Menu                                                                                 Breakfast                          Lunch  Orange juice, 4 ounces                                                Vegetable soup                    Stewed fruit                                                                 Fresh fruit plate with cottage cheese  Shredded  wheat                                                           Whole wheat toast  Scrambled eggs                                                           Butter  Whole wheat toast                                                       Coffee or tea  Dinner                                                                         Bedtime  Large glass tomato juice                                             1 glass milk  Roast beef                                                                   stewed fruit  Baked potato with skin  Buttered spinach  Raw vegetable salad  Baked apple with skin   Coffee or tea

## 2024-08-20 NOTE — PROGRESS NOTES
Gastroenterology Clinic Consultation Note    Reason for Visit:  The encounter diagnosis was Campylobacter diarrhea.    PCP:   Mamie Cotton   [address] [facility] / [place] [address]      Initial HPI   This is a 73 y.o. female presenting for hospital followup. Medical history includes GERD, hx of DVT, COPD, PAD. She reports that she is moving her bowels daily. The consistency fluctuates from soft to hard. When her stools are hard in consistency, she will get a cramping pain. This resolves after she moves her bowels. She has been feeling better. She completed the entire course of azithro. She denies melena, hematochezia, unintentional weight loss.     Patient recently hospitalized for colitis on 7/1/2024-7/4/2024. Patient presented with abdominal pain with associated diarrhea. CT completed noted mild stranding in the ascending colon. Stool culture completed noted patient was positive for Campylobacter. She was treated with Azithro. Had followup with her PCP on 7/12 with some improvement in her diarrhea, but still with occasional cramping. Was having intermittent rectal bleeding from irritation and treated with Anusol.     ROS:  Review of Systems   Constitutional:  Negative for chills, fever and weight loss.   Eyes:  Negative for redness.   Respiratory:  Negative for cough and wheezing.    Cardiovascular:  Negative for chest pain.   Gastrointestinal:  Positive for abdominal pain, constipation and diarrhea. Negative for blood in stool, heartburn, melena, nausea and vomiting.   Skin:  Negative for rash.   Neurological:  Negative for seizures, loss of consciousness and weakness.        Medical History:  has a past medical history of Allergy, Angio-edema, Asthma, Chronic obstructive pulmonary disease, unspecified COPD type (01/14/2022), Constipation (10/03/2019), Deep vein thrombophlebitis of left leg (07/27/2012), Deep vein  thrombosis, Diabetic foot ulcer with osteomyelitis, Encounter for blood transfusion, GERD (gastroesophageal reflux disease), Obesity, diabetes, and hypertension syndrome (2019), Obstructive sleep apnea (2012), PAD (peripheral artery disease) (2013), Pressure ulcer of toe of left foot, Type 2 diabetes mellitus with obesity (2020), and Type 2 diabetes mellitus with peripheral artery disease (2020).    Surgical History:  has a past surgical history that includes Total knee arthroplasty (2006); Total abdominal hysterectomy (); Ganglion cyst excision (2007); Cyst Removal (2011); Trigger finger release (2009);  section; Cataract extraction w/  intraocular lens implant (Left, 3/2002); Cataract extraction w/  intraocular lens implant (Right, ); Pan Retinal Photocoagulation (Bilateral); Eye surgery (Bilateral, ); Colonoscopy (N/A, 2018); Cystoscopy w/ retrogrades (Left, 2020); Joint replacement (Left); Interposition arthroplasty of carpometacarpal joints (Left, 2021); De Quervain's release (2021); Injection of facet joint (Bilateral, 2021); Injection of anesthetic agent around medial branch nerves innervating lumbar facet joint (Bilateral, 2022); Injection of anesthetic agent around medial branch nerves innervating lumbar facet joint (Bilateral, 2022); Radiofrequency thermocoagulation (Right, 2022); Radiofrequency thermocoagulation (Left, 2022); Esophagogastroduodenoscopy (N/A, 2023); Colonoscopy (N/A, 2023); and Radiofrequency ablation of lumbar medial branch nerve at single level (Bilateral, 2023).    Family History: family history includes Diabetes in her brother, mother, and sister; Heart disease in her father; Hypertension in her brother and mother; No Known Problems in her brother, daughter, daughter, sister, and son; Thyroid disease in her brother..       Review of patient's allergies  "indicates:   Allergen Reactions    Clindamycin Hives and Swelling    Iodinated contrast media Rash       Current Outpatient Medications on File Prior to Visit   Medication Sig Dispense Refill    albuterol (PROAIR HFA) 90 mcg/actuation inhaler Inhale 2 puffs into the lungs every 6 (six) hours as needed for Wheezing. Rescue 18 g 6    allopurinoL (ZYLOPRIM) 300 MG tablet       amLODIPine (NORVASC) 10 MG tablet TAKE 1 TABLET BY MOUTH ONCE  DAILY 90 tablet 3    aspirin 81 MG Chew Take 1 tablet (81 mg total) by mouth once daily.  0    atorvastatin (LIPITOR) 80 MG tablet Take 1 tablet (80 mg total) by mouth every evening. 30 tablet 11    BD ULTRA-FINE KIKA PEN NEEDLE 32 gauge x 5/32" Ndle USE 4 TIMES DAILY 200 each 20    blood sugar diagnostic Strp 1 strip 4 times daily. Contour Next. 200 strip 11    blood-glucose meter kit 1 each by Other route once daily. Use daily. Insurance proffered one touch  E11.42 1 each 0    blood-glucose sensor (DEXCOM G7 SENSOR MISC) by Misc.(Non-Drug; Combo Route) route.      chlorthalidone (HYGROTEN) 25 MG Tab Take 1 tablet (25 mg total) by mouth once daily. 90 tablet 3    ciclopirox 1 % shampoo Wash scalp once weekly 120 mL 3    clobetasoL (TEMOVATE) 0.05 % external solution Apply daily to affected areas of scalp 50 mL 3    CONSTULOSE 10 gram/15 mL solution       DEXCOM G7  Misc Use as directed 1 each 0    DEXCOM G7 SENSOR Terese 1 Device by Misc.(Non-Drug; Combo Route) route every 10 days. 3 each 11    dicyclomine (BENTYL) 10 MG capsule Take 1 capsule (10 mg total) by mouth 4 (four) times daily as needed (abdominal pain). 40 capsule 0    dulaglutide (TRULICITY) 4.5 mg/0.5 mL pen injector INJECT THE CONTENTS OF ONE PEN  SUBCUTANEOUSLY WEEKLY AS  DIRECTED 12 pen 3    DULoxetine (CYMBALTA) 30 MG capsule Take 2 capsules (60 mg total) by mouth once daily. 180 capsule 3    glucagon (GVOKE HYPOPEN 1-PACK) 0.5 mg/0.1 mL AtIn Inject 1 Dose into the skin daily as needed (for severe hypoglycemia " "if you aren't able to treat by ingesting sugar). 1 Syringe 3    hydrocortisone (ANUSOL-HC) 2.5 % rectal cream Place rectally 2 (two) times daily as needed for Hemorrhoids. 28 g 1    insulin lispro (HUMALOG KWIKPEN INSULIN) 100 unit/mL pen INJECT 13 UNITS INTO THE  SKIN 3 TIMES DAILY BEFORE  MEALS PLUS SLIDING SCALE -  MAX TOTAL DAILY DOSE 70  UNITS 30 mL 6    irbesartan (AVAPRO) 300 MG tablet Take 1 tablet (300 mg total) by mouth every evening. 90 tablet 3    JARDIANCE 10 mg tablet TAKE 1 TABLET BY MOUTH ONCE  DAILY 90 tablet 3    ketoconazole (NIZORAL) 2 % cream Apply topically once daily. 60 g 2    ketoconazole (NIZORAL) 2 % shampoo Wash hair with medicated shampoo at least 2x/week - let sit on scalp at least 5 minutes prior to rinsing 120 mL 5    labetaloL (NORMODYNE) 300 MG tablet Take 1 tablet (300 mg total) by mouth 2 (two) times daily. 180 tablet 3    lancets 31 gauge Misc 1 lancet by Misc.(Non-Drug; Combo Route) route 4 (four) times daily. E11.42 . Prescribed one touch 200 each 6    mometasone (ELOCON) 0.1 % ointment Apply topically once daily. Apply twice daily 1 week on, 1 week off for 3-6 months 45 g 1    multivitamin (THERAGRAN) per tablet Take 1 tablet by mouth once daily.      pregabalin (LYRICA) 150 MG capsule Take 1 capsule (150 mg total) by mouth 2 (two) times daily. 180 capsule 1    TRESIBA FLEXTOUCH U-100 100 unit/mL (3 mL) insulin pen Inject 45 Units into the skin every evening. 45 mL 3    sodium bicarbonate 325 MG tablet Take 2 tablets (650 mg total) by mouth 2 (two) times daily. 120 tablet 11    [DISCONTINUED] insulin glargine, TOUJEO, (TOUJEO SOLOSTAR) 300 unit/mL (1.5 mL) InPn pen INJECT 40 UNITS INTO THE SKIN EVERY EVENING TITRATE UP AS DIRECTED. 4.5 mL 0     No current facility-administered medications on file prior to visit.         Objective Findings:    Vital Signs:  /64   Pulse 82   Ht 5' 5" (1.651 m)   Wt 131.1 kg (289 lb 0.4 oz)   BMI 48.10 kg/m²   Body mass index is 48.1 " kg/m².    Physical Exam:  Physical Exam  Vitals and nursing note reviewed.   Constitutional:       Appearance: She is obese. She is not ill-appearing.   HENT:      Mouth/Throat:      Mouth: Mucous membranes are moist.      Pharynx: Oropharynx is clear.   Eyes:      General: No scleral icterus.  Abdominal:      General: Abdomen is flat. Bowel sounds are normal. There is no distension.      Palpations: Abdomen is soft. There is no mass.      Tenderness: There is no abdominal tenderness.      Hernia: No hernia is present.   Skin:     General: Skin is warm and dry.      Capillary Refill: Capillary refill takes less than 2 seconds.      Coloration: Skin is not jaundiced or pale.   Neurological:      Mental Status: She is alert and oriented to person, place, and time. Mental status is at baseline.             Labs:  Lab Results   Component Value Date    WBC 5.57 08/14/2024    HGB 10.8 (L) 08/14/2024    HCT 35.8 (L) 08/14/2024     08/14/2024    CRP 11.7 (H) 01/18/2022    CHOL 183 03/05/2024    TRIG 144 03/05/2024    HDL 43 03/05/2024    ALKPHOS 114 07/01/2024    LIPASE 13 07/01/2024    ALT 15 07/01/2024    AST 17 07/01/2024     07/04/2024    K 3.8 07/04/2024     07/04/2024    CREATININE 2.0 (H) 07/04/2024    BUN 28 (H) 07/04/2024    CO2 22 (L) 07/04/2024    TSH 1.365 09/08/2020    INR 1.0 03/10/2006    HGBA1C 7.0 (H) 03/05/2024       Imaging reviewed: CT A/P 7/1/2024  Impression:     Subtle stranding about ascending colon near the hepatic flexure as above, could represent underlying colitis.  Recommend clinical correlation.  Few prominent right mesenteric lymph nodes, possibly reactive.     Additional findings as above.        Electronically signed by:John Goff  Date:                                            07/01/2024  Time:                                           13:41      Endoscopy reviewed: Colonoscopy 4/26/2023  Impression:            - Four 5 to 7 mm polyps in the descending colon,                           in the transverse colon and in the ascending                          colon, removed with a cold snare. Resected and                          retrieved.                          - Retroflexion could not be performed.   Recommendation:        - Discharge patient to home (ambulatory).                          - Patient has a contact number available for                          emergencies. The signs and symptoms of potential                          delayed complications were discussed with the                          patient. Return to normal activities tomorrow.                          Written discharge instructions were provided to                          the patient.                          - Resume previous diet.                          - Continue present medications.                          - Return to primary care physician as previously                          scheduled.                          - Repeat colonoscopy in 3 years for surveillance.                          - Return to GI clinic as previously scheduled.   Attending Participation:        I personally performed the entire procedure.        I was present and participated during the entire procedure,        including non-key portions.   Pa Zamora MD   4/26/2023 9:55:46 AM     EGD 4/26/2023  Impression:            - Normal esophagus.                          - Normal examined duodenum.                          - Normal stomach.                          - No specimens collected.   Recommendation:        - Discharge patient to home.                          - Resume previous diet.                          - Return to GI clinic as previously scheduled.   Attending Participation:        I personally performed the entire procedure.   Pa Zamora MD   4/26/2023 9:55:17 AM       Assessment:  1. Campylobacter diarrhea             Plan:  Treated for above. CT findings likely related to her infection at that time.  Symptoms have improved and recommendations given to improve her overall stool consistency. She wishes to forgo any endoscopy at this time given her current financial burden from her recent appts and hospitalization. Given her improvement in her symptoms and absence of alarm features, It is reasonable to defer at this time.   RTC PRN       Thank you for allowing me to participate in this patient's care.    Sincerely,     Ashley Moon NP  Gastroenterology Department  [facility]

## 2024-08-21 DIAGNOSIS — N18.32 TYPE 2 DIABETES MELLITUS WITH STAGE 3B CHRONIC KIDNEY DISEASE AND HYPERTENSION: ICD-10-CM

## 2024-08-21 DIAGNOSIS — I12.9 TYPE 2 DIABETES MELLITUS WITH STAGE 3B CHRONIC KIDNEY DISEASE AND HYPERTENSION: ICD-10-CM

## 2024-08-21 DIAGNOSIS — N18.32 STAGE 3B CHRONIC KIDNEY DISEASE: ICD-10-CM

## 2024-08-21 DIAGNOSIS — E11.22 TYPE 2 DIABETES MELLITUS WITH STAGE 3B CHRONIC KIDNEY DISEASE AND HYPERTENSION: ICD-10-CM

## 2024-08-23 DIAGNOSIS — Z79.4 TYPE 2 DIABETES MELLITUS WITH DIABETIC POLYNEUROPATHY, WITH LONG-TERM CURRENT USE OF INSULIN: ICD-10-CM

## 2024-08-23 DIAGNOSIS — E11.42 TYPE 2 DIABETES MELLITUS WITH DIABETIC POLYNEUROPATHY, WITH LONG-TERM CURRENT USE OF INSULIN: ICD-10-CM

## 2024-08-23 RX ORDER — PREGABALIN 150 MG/1
150 CAPSULE ORAL 2 TIMES DAILY
Qty: 180 CAPSULE | Refills: 3 | Status: SHIPPED | OUTPATIENT
Start: 2024-08-23

## 2024-08-29 ENCOUNTER — PATIENT MESSAGE (OUTPATIENT)
Dept: GASTROENTEROLOGY | Facility: CLINIC | Age: 74
End: 2024-08-29
Payer: MEDICARE

## 2024-08-29 ENCOUNTER — LAB VISIT (OUTPATIENT)
Dept: LAB | Facility: HOSPITAL | Age: 74
End: 2024-08-29
Attending: INTERNAL MEDICINE
Payer: MEDICARE

## 2024-08-29 ENCOUNTER — OFFICE VISIT (OUTPATIENT)
Dept: NEPHROLOGY | Facility: CLINIC | Age: 74
End: 2024-08-29
Payer: MEDICARE

## 2024-08-29 VITALS
DIASTOLIC BLOOD PRESSURE: 74 MMHG | HEART RATE: 71 BPM | OXYGEN SATURATION: 98 % | SYSTOLIC BLOOD PRESSURE: 122 MMHG | BODY MASS INDEX: 48.09 KG/M2 | WEIGHT: 289 LBS

## 2024-08-29 DIAGNOSIS — N18.4 TYPE 2 DIABETES MELLITUS WITH STAGE 4 CHRONIC KIDNEY DISEASE, UNSPECIFIED WHETHER LONG TERM INSULIN USE: ICD-10-CM

## 2024-08-29 DIAGNOSIS — N25.81 SECONDARY HYPERPARATHYROIDISM: ICD-10-CM

## 2024-08-29 DIAGNOSIS — Z79.4 TYPE 2 DIABETES MELLITUS WITH BOTH EYES AFFECTED BY PROLIFERATIVE RETINOPATHY AND MACULAR EDEMA, WITH LONG-TERM CURRENT USE OF INSULIN: ICD-10-CM

## 2024-08-29 DIAGNOSIS — N18.4 STAGE 4 CHRONIC KIDNEY DISEASE: Primary | ICD-10-CM

## 2024-08-29 DIAGNOSIS — D63.1 ANEMIA IN CHRONIC KIDNEY DISEASE, UNSPECIFIED CKD STAGE: ICD-10-CM

## 2024-08-29 DIAGNOSIS — E66.01 MORBID OBESITY: ICD-10-CM

## 2024-08-29 DIAGNOSIS — N18.9 ANEMIA OF RENAL DISEASE: ICD-10-CM

## 2024-08-29 DIAGNOSIS — I10 ESSENTIAL HYPERTENSION: ICD-10-CM

## 2024-08-29 DIAGNOSIS — N18.4 STAGE 4 CHRONIC KIDNEY DISEASE: ICD-10-CM

## 2024-08-29 DIAGNOSIS — E11.3513 TYPE 2 DIABETES MELLITUS WITH BOTH EYES AFFECTED BY PROLIFERATIVE RETINOPATHY AND MACULAR EDEMA, WITH LONG-TERM CURRENT USE OF INSULIN: ICD-10-CM

## 2024-08-29 DIAGNOSIS — I13.0 HYPERTENSIVE HEART AND CHRONIC KIDNEY DISEASE WITH HEART FAILURE AND STAGE 1 THROUGH STAGE 4 CHRONIC KIDNEY DISEASE, OR UNSPECIFIED CHRONIC KIDNEY DISEASE: ICD-10-CM

## 2024-08-29 DIAGNOSIS — N28.89 LEFT RENAL MASS: ICD-10-CM

## 2024-08-29 DIAGNOSIS — N18.9 ANEMIA IN CHRONIC KIDNEY DISEASE, UNSPECIFIED CKD STAGE: ICD-10-CM

## 2024-08-29 DIAGNOSIS — E11.22 TYPE 2 DIABETES MELLITUS WITH STAGE 4 CHRONIC KIDNEY DISEASE, UNSPECIFIED WHETHER LONG TERM INSULIN USE: ICD-10-CM

## 2024-08-29 DIAGNOSIS — D63.1 ANEMIA OF RENAL DISEASE: ICD-10-CM

## 2024-08-29 DIAGNOSIS — I51.89 DIASTOLIC DYSFUNCTION: Chronic | ICD-10-CM

## 2024-08-29 LAB
ALBUMIN SERPL BCP-MCNC: 3.5 G/DL (ref 3.5–5.2)
ANION GAP SERPL CALC-SCNC: 10 MMOL/L (ref 8–16)
BUN SERPL-MCNC: 32 MG/DL (ref 8–23)
CALCIUM SERPL-MCNC: 9.7 MG/DL (ref 8.7–10.5)
CHLORIDE SERPL-SCNC: 106 MMOL/L (ref 95–110)
CO2 SERPL-SCNC: 26 MMOL/L (ref 23–29)
CREAT SERPL-MCNC: 2 MG/DL (ref 0.5–1.4)
EST. GFR  (NO RACE VARIABLE): 25.7 ML/MIN/1.73 M^2
FERRITIN SERPL-MCNC: 274 NG/ML (ref 20–300)
GLUCOSE SERPL-MCNC: 91 MG/DL (ref 70–110)
PHOSPHATE SERPL-MCNC: 3 MG/DL (ref 2.7–4.5)
POTASSIUM SERPL-SCNC: 3.8 MMOL/L (ref 3.5–5.1)
SODIUM SERPL-SCNC: 142 MMOL/L (ref 136–145)

## 2024-08-29 PROCEDURE — 99999 PR PBB SHADOW E&M-EST. PATIENT-LVL IV: CPT | Mod: PBBFAC,,, | Performed by: NURSE PRACTITIONER

## 2024-08-29 PROCEDURE — 82728 ASSAY OF FERRITIN: CPT | Performed by: INTERNAL MEDICINE

## 2024-08-29 PROCEDURE — 36415 COLL VENOUS BLD VENIPUNCTURE: CPT | Performed by: INTERNAL MEDICINE

## 2024-08-29 PROCEDURE — 80069 RENAL FUNCTION PANEL: CPT | Performed by: NURSE PRACTITIONER

## 2024-08-29 NOTE — PROGRESS NOTES
Subjective:       Patient ID: Annika Mac is a 73 y.o.  female who presents for follow-up evaluation of CKD.    HPI     Patient is new to me. Last seen by Dr. Cancino in 2024. Followed by Dr. Elias prior to this. Prior pertinent chart reviewed since this is patient's first appointment with me.    Patient presents for Follow-up of CKD.  Baseline creatinine of 1.6-1.9. Recent uptrend to 2.2 during admission for colitis in July; now ranging near 2.0.     Significant other medical problems include T2DM with retinopathy, left renal mass, HTN, diastolic dysfunction, RUFINA, h/o stroke, chronic gout, lumbar stenosis, GERD, MARION, secondary hyperparathyroidism. Reports previously taken off of irbesartan for angioedema. She sometimes feels dizzy with standing. She wears her CPAP at night. She drinks about 5-6, 16 oz bottles per day.     The patient denies taking NSAIDs, herbal supplements, or new antibiotics, recreational drugs, recent episode of dehydration, diarrhea, nausea or vomiting, acute illness, hospitalization or exposure to IV radiocontrast.     Significant family hx includes:   Mother Diabetes    Hypertension   Father ( at age 60s) Heart disease   Sister Diabetes   Sister () No Known Problems   Brother () No Known Problems   Brother Thyroid disease    Diabetes    Hypertension   Daughter No Known Problems   Daughter No Known Problems   Son No Known Problems       Last renal US: 20 , reviewed.     FINDINGS:  The kidneys are normal sized. The right kidney measures 10.2cm.  The left kidney measures 10.3cm.  There is appropriate cortical echogenicity. Corticomedullary distinction is reduced bilaterally.  There is mild bilateral cortical thinning present. Perfusion of the right kidney is appropriate and perfusion of the left kidney is decreased. The resistive index of the right kidney is 0.8.  The resistive index of the left kidney is 0.82.  These findings are all consistent with chronic  renal disease slightly worse on the left than the right.     There are bilateral renal cysts present.  Two on the right.  The largest measures 2.6 x 2.9 x 2.1 cm.  On the left there is a 1.7 x 1.2 x 1.5 renal cyst which could correspond to the abnormality noted on the CT scan.  However there is a new solid mass lesion close to the previously described cyst measuring 0.8 x 0.9 x 0.9 cm.  This appears to be solid and is echogenic.  As stated this was not present on the patient's previous renal ultrasound.  Further assessment with renal contrast ultrasound study is recommended.  This could represent an angiomyolipoma but other solid mass lesions can not be excluded.  There is no hydronephrosis, nephrolithiasis or solid renal masses.  The bladder was partially distended and appears unremarkable     Impression:     1. Interval development of small solid mass density in the left kidney measuring 9 mm.  An ultrasound contrast study is recommended for further characterization  2. The lesion noted on the CT January 12 2020 CT scan probably represents a renal cyst which was present on previous examinations  3. Small right renal cysts  4. Chronic renal disease  1.  This report was flagged in Epic as abnormal.    Review of Systems   Respiratory:  Negative for shortness of breath.    Cardiovascular:  Negative for leg swelling.   Gastrointestinal:  Negative for diarrhea, nausea and vomiting.   Genitourinary:  Negative for difficulty urinating, dysuria and hematuria.   Neurological:  Positive for dizziness (with standing). Negative for syncope.   All other systems reviewed and are negative.      Objective:       Blood pressure 122/74, pulse 71, weight 131.1 kg (289 lb), SpO2 98%.  Physical Exam  Vitals reviewed.   Constitutional:       General: She is not in acute distress.     Appearance: Normal appearance. She is obese.   HENT:      Head: Normocephalic and atraumatic.   Eyes:      General: No scleral icterus.  Cardiovascular:       Rate and Rhythm: Normal rate and regular rhythm.   Pulmonary:      Effort: Pulmonary effort is normal. No respiratory distress.      Breath sounds: No wheezing or rales.   Musculoskeletal:      Right lower leg: No edema.      Left lower leg: No edema.   Skin:     General: Skin is warm and dry.   Neurological:      Mental Status: She is alert and oriented to person, place, and time.   Psychiatric:         Mood and Affect: Mood normal.         Behavior: Behavior normal.           Lab Results   Component Value Date    CREATININE 2.0 (H) 07/04/2024    URICACID 5.4 04/20/2023     Prot/Creat Ratio, Urine   Date Value Ref Range Status   05/22/2024 Unable to calculate 0.00 - 0.20 Final   11/13/2019 Unable to calculate 0.00 - 0.20 Final   02/13/2019 Unable to calculate 0.00 - 0.20 Final     Lab Results   Component Value Date     07/04/2024    K 3.8 07/04/2024    CO2 22 (L) 07/04/2024     07/04/2024     Lab Results   Component Value Date    .4 (H) 05/22/2024    CALCIUM 9.2 07/04/2024    CAION 1.27 02/28/2012    PHOS 3.5 07/04/2024     Lab Results   Component Value Date    HGB 10.8 (L) 08/14/2024    WBC 5.57 08/14/2024    HCT 35.8 (L) 08/14/2024      Lab Results   Component Value Date    HGBA1C 7.0 (H) 03/05/2024     08/14/2024    BUN 28 (H) 07/04/2024     Lab Results   Component Value Date    LDLCALC 111.2 03/05/2024         Assessment:       1. Stage 4 chronic kidney disease    2. Essential hypertension    3. Hypertensive heart and chronic kidney disease with heart failure and stage 1 through stage 4 chronic kidney disease, or unspecified chronic kidney disease    4. Anemia of renal disease    5. Morbid obesity    6. Type 2 diabetes mellitus with stage 4 chronic kidney disease, unspecified whether long term insulin use    7. Type 2 diabetes mellitus with both eyes affected by proliferative retinopathy and macular edema, with long-term current use of insulin    8. Diastolic dysfunction    9. Left  renal mass    10. Secondary hyperparathyroidism        Plan:   CKD stage IV c eGFR 25.7 mL/min -  - Baseline Cr appears to be about 1.6-1.9, now ranging 2.0 s/p admission for colitis  - CKD clinically related to longstanding T2DM (with retinopathy and neuropathy) and HTN  - Continue good hydration. Avoid NSAIDs. Continue SGLT2i. Had angioedema with ARB.     UPCR Nonproteinuric. Continue SGLT2i   Acid-base Stable   Secondary hyperparathyroidism Ca and phos okay. PTH elevated, stable in CKD.    Anemia Hgb within CKD goal. Monitor CBC and iron studies.   DM Now controlled. Reported retinopathy. Agree with SGLT2i.    Lipid Management Continue statin   ESRD planning Provided education about the stages of CKD, usual progression, and need to monitor for and treat CKD-related disease in an effort to delay progression of CKD.     Plan for transplant referral when eGFR 22mL/min. However BMI may preclude transplant (goal < 38), weight loss is encouraged.      HTN - Controlled on current regimen. She does report intermittent dizziness with standing. Would recommend alternative to chlorthalidone if persists. Continue adequate hydration.     Diastolic dysfunction - Grade II DD on last echo. Asymptomatic today. Continue with cardiology.     Left renal mass - previously followed by Urology. Recommend f/u.     All questions patient had were answered.  Asked if further questions. None. F/u in clinic 3 months  with labs and urine prior to next visit or sooner if needed.  ER for emergency concerns.    Summary of Plan:  - RFP today  - RTC in 3 months with labs for monitoring

## 2024-09-09 DIAGNOSIS — N18.30 ANEMIA IN STAGE 3 CHRONIC KIDNEY DISEASE, UNSPECIFIED WHETHER STAGE 3A OR 3B CKD: ICD-10-CM

## 2024-09-09 DIAGNOSIS — D63.1 ANEMIA IN STAGE 3 CHRONIC KIDNEY DISEASE, UNSPECIFIED WHETHER STAGE 3A OR 3B CKD: ICD-10-CM

## 2024-09-09 DIAGNOSIS — N18.9 ANEMIA IN CHRONIC KIDNEY DISEASE, UNSPECIFIED CKD STAGE: Primary | ICD-10-CM

## 2024-09-09 DIAGNOSIS — D50.0 ANEMIA DUE TO CHRONIC BLOOD LOSS: ICD-10-CM

## 2024-09-09 DIAGNOSIS — D63.1 ANEMIA IN CHRONIC KIDNEY DISEASE, UNSPECIFIED CKD STAGE: Primary | ICD-10-CM

## 2024-09-18 ENCOUNTER — TELEPHONE (OUTPATIENT)
Dept: NEPHROLOGY | Facility: CLINIC | Age: 74
End: 2024-09-18
Payer: MEDICARE

## 2024-09-25 ENCOUNTER — OFFICE VISIT (OUTPATIENT)
Dept: HEMATOLOGY/ONCOLOGY | Facility: CLINIC | Age: 74
End: 2024-09-25
Payer: MEDICARE

## 2024-09-25 ENCOUNTER — LAB VISIT (OUTPATIENT)
Dept: LAB | Facility: HOSPITAL | Age: 74
End: 2024-09-25
Attending: INTERNAL MEDICINE
Payer: MEDICARE

## 2024-09-25 VITALS
BODY MASS INDEX: 48.82 KG/M2 | DIASTOLIC BLOOD PRESSURE: 73 MMHG | WEIGHT: 293 LBS | TEMPERATURE: 98 F | OXYGEN SATURATION: 96 % | HEART RATE: 70 BPM | HEIGHT: 65 IN | SYSTOLIC BLOOD PRESSURE: 172 MMHG

## 2024-09-25 DIAGNOSIS — D63.1 ANEMIA IN STAGE 3B CHRONIC KIDNEY DISEASE: Primary | ICD-10-CM

## 2024-09-25 DIAGNOSIS — N18.30 ANEMIA IN STAGE 3 CHRONIC KIDNEY DISEASE, UNSPECIFIED WHETHER STAGE 3A OR 3B CKD: ICD-10-CM

## 2024-09-25 DIAGNOSIS — N18.32 ANEMIA IN STAGE 3B CHRONIC KIDNEY DISEASE: Primary | ICD-10-CM

## 2024-09-25 DIAGNOSIS — D63.1 ANEMIA IN STAGE 3 CHRONIC KIDNEY DISEASE, UNSPECIFIED WHETHER STAGE 3A OR 3B CKD: ICD-10-CM

## 2024-09-25 DIAGNOSIS — D50.0 ANEMIA DUE TO CHRONIC BLOOD LOSS: ICD-10-CM

## 2024-09-25 DIAGNOSIS — N18.9 ANEMIA IN CHRONIC KIDNEY DISEASE, UNSPECIFIED CKD STAGE: ICD-10-CM

## 2024-09-25 DIAGNOSIS — D63.1 ANEMIA IN CHRONIC KIDNEY DISEASE, UNSPECIFIED CKD STAGE: ICD-10-CM

## 2024-09-25 LAB
ALBUMIN SERPL BCP-MCNC: 3.4 G/DL (ref 3.5–5.2)
ALP SERPL-CCNC: 123 U/L (ref 55–135)
ALT SERPL W/O P-5'-P-CCNC: 12 U/L (ref 10–44)
ANION GAP SERPL CALC-SCNC: 11 MMOL/L (ref 8–16)
AST SERPL-CCNC: 21 U/L (ref 10–40)
BASOPHILS # BLD AUTO: 0.04 K/UL (ref 0–0.2)
BASOPHILS NFR BLD: 0.6 % (ref 0–1.9)
BILIRUB SERPL-MCNC: 0.4 MG/DL (ref 0.1–1)
BUN SERPL-MCNC: 37 MG/DL (ref 8–23)
CALCIUM SERPL-MCNC: 9.5 MG/DL (ref 8.7–10.5)
CHLORIDE SERPL-SCNC: 103 MMOL/L (ref 95–110)
CO2 SERPL-SCNC: 23 MMOL/L (ref 23–29)
CREAT SERPL-MCNC: 1.9 MG/DL (ref 0.5–1.4)
DIFFERENTIAL METHOD BLD: ABNORMAL
EOSINOPHIL # BLD AUTO: 0.4 K/UL (ref 0–0.5)
EOSINOPHIL NFR BLD: 5.7 % (ref 0–8)
ERYTHROCYTE [DISTWIDTH] IN BLOOD BY AUTOMATED COUNT: 16 % (ref 11.5–14.5)
EST. GFR  (NO RACE VARIABLE): 27.4 ML/MIN/1.73 M^2
FERRITIN SERPL-MCNC: 328 NG/ML (ref 20–300)
GLUCOSE SERPL-MCNC: 269 MG/DL (ref 70–110)
HCT VFR BLD AUTO: 33.7 % (ref 37–48.5)
HGB BLD-MCNC: 10.6 G/DL (ref 12–16)
IMM GRANULOCYTES # BLD AUTO: 0.03 K/UL (ref 0–0.04)
IMM GRANULOCYTES NFR BLD AUTO: 0.4 % (ref 0–0.5)
LYMPHOCYTES # BLD AUTO: 1.3 K/UL (ref 1–4.8)
LYMPHOCYTES NFR BLD: 19 % (ref 18–48)
MCH RBC QN AUTO: 26.6 PG (ref 27–31)
MCHC RBC AUTO-ENTMCNC: 31.5 G/DL (ref 32–36)
MCV RBC AUTO: 85 FL (ref 82–98)
MONOCYTES # BLD AUTO: 0.6 K/UL (ref 0.3–1)
MONOCYTES NFR BLD: 9.1 % (ref 4–15)
NEUTROPHILS # BLD AUTO: 4.5 K/UL (ref 1.8–7.7)
NEUTROPHILS NFR BLD: 65.2 % (ref 38–73)
NRBC BLD-RTO: 0 /100 WBC
PLATELET # BLD AUTO: 236 K/UL (ref 150–450)
PMV BLD AUTO: 11 FL (ref 9.2–12.9)
POTASSIUM SERPL-SCNC: 4.2 MMOL/L (ref 3.5–5.1)
PROT SERPL-MCNC: 6.3 G/DL (ref 6–8.4)
RBC # BLD AUTO: 3.98 M/UL (ref 4–5.4)
SODIUM SERPL-SCNC: 137 MMOL/L (ref 136–145)
WBC # BLD AUTO: 6.9 K/UL (ref 3.9–12.7)

## 2024-09-25 PROCEDURE — 3060F POS MICROALBUMINURIA REV: CPT | Mod: CPTII,S$GLB,, | Performed by: INTERNAL MEDICINE

## 2024-09-25 PROCEDURE — 1159F MED LIST DOCD IN RCRD: CPT | Mod: CPTII,S$GLB,, | Performed by: INTERNAL MEDICINE

## 2024-09-25 PROCEDURE — 3008F BODY MASS INDEX DOCD: CPT | Mod: CPTII,S$GLB,, | Performed by: INTERNAL MEDICINE

## 2024-09-25 PROCEDURE — 3077F SYST BP >= 140 MM HG: CPT | Mod: CPTII,S$GLB,, | Performed by: INTERNAL MEDICINE

## 2024-09-25 PROCEDURE — 1101F PT FALLS ASSESS-DOCD LE1/YR: CPT | Mod: CPTII,S$GLB,, | Performed by: INTERNAL MEDICINE

## 2024-09-25 PROCEDURE — 4010F ACE/ARB THERAPY RXD/TAKEN: CPT | Mod: CPTII,S$GLB,, | Performed by: INTERNAL MEDICINE

## 2024-09-25 PROCEDURE — 1160F RVW MEDS BY RX/DR IN RCRD: CPT | Mod: CPTII,S$GLB,, | Performed by: INTERNAL MEDICINE

## 2024-09-25 PROCEDURE — 80053 COMPREHEN METABOLIC PANEL: CPT | Performed by: INTERNAL MEDICINE

## 2024-09-25 PROCEDURE — 3051F HG A1C>EQUAL 7.0%<8.0%: CPT | Mod: CPTII,S$GLB,, | Performed by: INTERNAL MEDICINE

## 2024-09-25 PROCEDURE — 3078F DIAST BP <80 MM HG: CPT | Mod: CPTII,S$GLB,, | Performed by: INTERNAL MEDICINE

## 2024-09-25 PROCEDURE — 36415 COLL VENOUS BLD VENIPUNCTURE: CPT | Performed by: INTERNAL MEDICINE

## 2024-09-25 PROCEDURE — 99999 PR PBB SHADOW E&M-EST. PATIENT-LVL III: CPT | Mod: PBBFAC,,, | Performed by: INTERNAL MEDICINE

## 2024-09-25 PROCEDURE — 99214 OFFICE O/P EST MOD 30 MIN: CPT | Mod: S$GLB,,, | Performed by: INTERNAL MEDICINE

## 2024-09-25 PROCEDURE — 3066F NEPHROPATHY DOC TX: CPT | Mod: CPTII,S$GLB,, | Performed by: INTERNAL MEDICINE

## 2024-09-25 PROCEDURE — 1126F AMNT PAIN NOTED NONE PRSNT: CPT | Mod: CPTII,S$GLB,, | Performed by: INTERNAL MEDICINE

## 2024-09-25 PROCEDURE — 3288F FALL RISK ASSESSMENT DOCD: CPT | Mod: CPTII,S$GLB,, | Performed by: INTERNAL MEDICINE

## 2024-09-25 PROCEDURE — 82728 ASSAY OF FERRITIN: CPT | Performed by: INTERNAL MEDICINE

## 2024-09-25 PROCEDURE — 85025 COMPLETE CBC W/AUTO DIFF WBC: CPT | Performed by: INTERNAL MEDICINE

## 2024-09-25 NOTE — PROGRESS NOTES
Subjective:       Patient ID: Annika Mac is a 73 y.o. female.    Chief Complaint: No chief complaint on file.    Anemia     Mrs. Mac returns today for follow up.  I had last seen her on August 14, 2024 and had continued to hold the erythropoietin and asked her to see me 4 weeks later.  She presents today with repeat labs.        Briefly, she is a 73 year old morbidly obese female who comes today for follow up for her longstanding anemia.    Multiple stool samples in the past have been negative for occult blood.   Of note, she had an EGD, colonoscopy, and video capsule swallow 4 years ago.  A tubular adenoma was removed from her colon while her EGD had shown patchy mildly erythematous mucosa without bleeding in the gastric body.  Biopsies were negative for malignancy.  The video capsule swallow was unremarkable although it was not an optimal study.    Her CBC today shows a white count of 6,900 per cubic mm, hemoglobin 10.6 grams/deciliter, hematocrit 33.7 %, MCV 85, and platelets 236K.  Her creatinine is 1.9 mg/dL, BUN is 37 and eGFR is 27 mL/minute  She was supposed to submit 3 stool samples today, however, she states that she forgot them at home.    A recent EGD in April 2023 was normal.  Colonoscopy showed 4 polyps that were removed. Biopsy of all 4 of them showed tubular adenomas.  As mentioned above, several stool samples so far have been negative for occult blood.    Review of Systems  Overall she feels fair. She again complains of her longstanding arthritic pains involving primarily her knees, her back, and her hips.  She uses a wheelchair for ambulation.  She complains of shortness of breath with exertion.  She denies any anxiety, depression, easy bruising, fevers, chills, night sweats, weight loss, nausea, vomiting, diarrhea, diplopia, blurred vision, epistaxis, hematuria, or headaches.      Objective:      Physical Exam  GENERAL: She is alert, oriented to time, place, person, pleasant, well nourished,  in no acute physical distress.  She is able to climb to the examination table with assistance.  VITAL SIGNS: Reviewed.   HEENT: Normal. There are no nasal, oral, lip, gingival, auricular, lid, conjunctival lesions. Pupils are equal, reactive to light and   accommodation and extraocular muscle movements are intact. Mucosae are moist and pink, and there is no thrush.  Maxillary teeth are missing.  NECK: Supple without JVD, adenopathy, or thyromegaly.   LUNGS: Clear to auscultation without wheezing, rales, or rhonchi.   CARDIOVASCULAR: Reveals an S1, S2, no murmurs, no rubs, no gallops.   ABDOMEN: Obese, soft, nontender, without organomegaly. Bowel sounds are present. A median abdominal scar is seen extending from the umbilicus to the symphysis pubis.   EXTREMITIES: No cyanosis or clubbing. There is 1(+) edema at the ankle level bilaterally.  SKIN: Does not have petechiae, rashes, induration, or ecchymoses.   NEUROLOGIC: Motor function is 5/5, DTRs are 0 to 1+ bilaterally, symmetrical, and cranial nerves within normal limits.   LYMPHATIC: There is no cervical, axillary, or supraclavicular adenopathy.       Assessment:       1. Anemia in stage 3 chronic kidney disease      2. Morbid obesity due to excess calories     3. 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 Mac.      I explained to her that her hemoglobin is still too high for her to be able to restart erythropoietin.  I asked her to return in 6 weeks with a repeat CBC, CMP and ferritin.  We will restart erythropoietin when the hemoglobin is 10 grams/dL or below.    Her multiple questions were answered to her satisfaction.  I spent approximately 35 minutes reviewing the available records and evaluating the patient, out of which over 50% of the time was spent face to face with the patient in counseling and coordinating this patient's care.      Route Chart for Scheduling    Med Onc Chart  Routing      Follow up with physician . See me early November with albs.  please have her on thed schedule for EPO the day she comes   Follow up with KATIE    Infusion scheduling note    Injection scheduling note    Labs    Imaging    Pharmacy appointment    Other referrals          Supportive Plan Information  OP EPOETIN ASAF WEEKLY Ivan Rodriguez MD   Associated Diagnosis: Anemia of renal disease   noted on 9/21/2020   Line of treatment: Supportive Care   Treatment goal: Supportive     Upcoming Treatment Dates - OP EPOETIN ASAF WEEKLY    6/19/2024       Medications       epoetin asaf-epbx injection 40,000 Units    Therapy Plan Information  INJECTAFER (FERRIC CARBOXYMALTOSE) for Iron deficiency anemia, noted on 2/26/2018  INJECTAFER (FERRIC CARBOXYMALTOSE) for Anemia of renal disease, noted on 9/21/2020  None      No therapy plan of the specified type found.    No therapy plan of the specified type found.

## 2024-09-26 ENCOUNTER — TELEPHONE (OUTPATIENT)
Dept: HEMATOLOGY/ONCOLOGY | Facility: CLINIC | Age: 74
End: 2024-09-26
Payer: MEDICARE

## 2024-09-26 NOTE — TELEPHONE ENCOUNTER
Attempted to reach patient but phone got disconnected. Cancelled upcoming EPO injections for next week based on Dr Kwan's clinic not from 9/25.    ----- Message from Valerie Chahal sent at 9/26/2024  4:50 PM CDT -----  Regarding: Consult/Advisory  Contact: :Annika Mac     Consult/Advisory     Name Of Caller:Annika Mac         Contact Preference:[phone]      Nature of call:Patient is calling about her injections that are on schedule and she states she doesn't need them cause her levels were fine. Requesting a call back

## 2024-09-29 DIAGNOSIS — E11.42 TYPE 2 DIABETES MELLITUS WITH DIABETIC POLYNEUROPATHY, UNSPECIFIED WHETHER LONG TERM INSULIN USE: ICD-10-CM

## 2024-09-30 PROBLEM — N18.9 ACUTE KIDNEY INJURY SUPERIMPOSED ON CHRONIC KIDNEY DISEASE: Status: RESOLVED | Noted: 2024-07-01 | Resolved: 2024-09-30

## 2024-09-30 PROBLEM — N17.9 ACUTE KIDNEY INJURY SUPERIMPOSED ON CHRONIC KIDNEY DISEASE: Status: RESOLVED | Noted: 2024-07-01 | Resolved: 2024-09-30

## 2024-10-01 RX ORDER — BLOOD-GLUCOSE SENSOR
EACH MISCELLANEOUS
Qty: 9 EACH | Refills: 3 | Status: SHIPPED | OUTPATIENT
Start: 2024-10-01

## 2024-10-19 DIAGNOSIS — I10 ESSENTIAL HYPERTENSION: ICD-10-CM

## 2024-10-19 DIAGNOSIS — Z79.4 TYPE 2 DIABETES MELLITUS WITH DIABETIC POLYNEUROPATHY, WITH LONG-TERM CURRENT USE OF INSULIN: ICD-10-CM

## 2024-10-19 DIAGNOSIS — E11.42 TYPE 2 DIABETES MELLITUS WITH DIABETIC POLYNEUROPATHY, WITH LONG-TERM CURRENT USE OF INSULIN: ICD-10-CM

## 2024-10-19 NOTE — TELEPHONE ENCOUNTER
Care Due:                  Date            Visit Type   Department     Provider  --------------------------------------------------------------------------------                                EP [facility] INTERNAL  Mamie Mora  Last Visit: 07-      CARE ([facility])   MEDICINE       Yury                              [facility] INTERNAL  Mamie Mora  Next Visit: 10-      CARE ([facility])   MEDICINE       Yury                                                            Last  Test          Frequency    Reason                     Performed    Due Date  --------------------------------------------------------------------------------    HBA1C.......  6 months...  empagliflozin............  03- 09-    [facility] Embedded Care Due Messages. Reference number: [id number].   10/19/2024 10:52:58 AM CDT

## 2024-10-20 RX ORDER — AMLODIPINE BESYLATE 10 MG/1
10 TABLET ORAL DAILY
Qty: 90 TABLET | Refills: 2 | Status: SHIPPED | OUTPATIENT
Start: 2024-10-20

## 2024-10-20 NOTE — TELEPHONE ENCOUNTER
Refill Routing Note   Medication(s) are not appropriate for processing by [facility] for the following reason(s):        Required vitals abnormal    [facility] action(s):  Defer     Requires labs : Yes             Appointments  past 12m or future 3m with PCP    Date Provider   Last Visit   7/19/2024 Mamie Cotton MD   Next Visit   10/25/2024 Mamie Cotton MD   ED visits in past 90 days: 0        Note composed:2:00 PM 10/20/2024

## 2024-10-21 RX ORDER — PEN NEEDLE, DIABETIC 31 GX5/16"
NEEDLE, DISPOSABLE MISCELLANEOUS
Qty: 200 EACH | Refills: 20 | Status: SHIPPED | OUTPATIENT
Start: 2024-10-21

## 2024-10-25 ENCOUNTER — OFFICE VISIT (OUTPATIENT)
Dept: INTERNAL MEDICINE | Facility: CLINIC | Age: 74
End: 2024-10-25
Payer: MEDICARE

## 2024-10-25 ENCOUNTER — PATIENT MESSAGE (OUTPATIENT)
Dept: PODIATRY | Facility: CLINIC | Age: 74
End: 2024-10-25
Payer: MEDICARE

## 2024-10-25 VITALS
SYSTOLIC BLOOD PRESSURE: 138 MMHG | DIASTOLIC BLOOD PRESSURE: 62 MMHG | WEIGHT: 293 LBS | BODY MASS INDEX: 48.82 KG/M2 | HEIGHT: 65 IN | OXYGEN SATURATION: 100 % | HEART RATE: 75 BPM

## 2024-10-25 DIAGNOSIS — I12.9 TYPE 2 DIABETES MELLITUS WITH STAGE 3B CHRONIC KIDNEY DISEASE AND HYPERTENSION: ICD-10-CM

## 2024-10-25 DIAGNOSIS — R21 RASH AND NONSPECIFIC SKIN ERUPTION: ICD-10-CM

## 2024-10-25 DIAGNOSIS — G47.33 OSA (OBSTRUCTIVE SLEEP APNEA): ICD-10-CM

## 2024-10-25 DIAGNOSIS — I10 PRIMARY HYPERTENSION: Primary | ICD-10-CM

## 2024-10-25 DIAGNOSIS — N18.32 TYPE 2 DIABETES MELLITUS WITH STAGE 3B CHRONIC KIDNEY DISEASE AND HYPERTENSION: ICD-10-CM

## 2024-10-25 DIAGNOSIS — M17.11 PRIMARY OSTEOARTHRITIS OF RIGHT KNEE: ICD-10-CM

## 2024-10-25 DIAGNOSIS — N18.32 STAGE 3B CHRONIC KIDNEY DISEASE: ICD-10-CM

## 2024-10-25 DIAGNOSIS — Z12.31 ENCOUNTER FOR SCREENING MAMMOGRAM FOR BREAST CANCER: ICD-10-CM

## 2024-10-25 DIAGNOSIS — Z86.73 HISTORY OF STROKE: ICD-10-CM

## 2024-10-25 DIAGNOSIS — D63.1 ANEMIA DUE TO CHRONIC KIDNEY DISEASE, UNSPECIFIED CKD STAGE: ICD-10-CM

## 2024-10-25 DIAGNOSIS — E11.22 TYPE 2 DIABETES MELLITUS WITH STAGE 3B CHRONIC KIDNEY DISEASE AND HYPERTENSION: ICD-10-CM

## 2024-10-25 DIAGNOSIS — N18.9 ANEMIA DUE TO CHRONIC KIDNEY DISEASE, UNSPECIFIED CKD STAGE: ICD-10-CM

## 2024-10-25 PROCEDURE — 99999 PR PBB SHADOW E&M-EST. PATIENT-LVL III: CPT | Mod: PBBFAC,,, | Performed by: INTERNAL MEDICINE

## 2024-10-25 RX ORDER — MUPIROCIN 20 MG/G
OINTMENT TOPICAL 3 TIMES DAILY
Qty: 30 G | Refills: 2 | Status: SHIPPED | OUTPATIENT
Start: 2024-10-25

## 2024-10-25 NOTE — PROGRESS NOTES
Subjective:       Patient ID: Annika Mac is a 73 y.o. female.    Chief Complaint: Follow-up    History of Present Illness    CHIEF COMPLAINT:  Annika presents today for follow-up and concerns about an irritated belly button.    BELLY BUTTON IRRITATION:  She reports annual recurrence of belly button irritation, typically occurring around this time of year. The area is itchy without drainage. She has been self-managing by cleaning with mixed peroxide. She notes a small opening in her abdomen button from a previous surgery, which may contribute to the irritation. She denies associated pain.    HYPERTENSION:  Recent home blood pressure readings have been good, with a reading of 124-136/64 yesterday. She is currently taking amlodipine 10 mg increased since her last visit labetalol and chlorthalidone 25 mg for management. She confirms discontinuation of irbesartan 300 mg due to a previous episode of angioedema.    DIABETES:  She continues Trulicity and Jardiance 10 mg for diabetes along with her insulin. Blood sugar levels are generally stable, with occasional evening elevations if she eats late. A recent evening blood sugar reading was 160. She has been trying to avoid late-night eating to prevent these fluctuations. She uses a G7 continuous glucose monitoring device, cover during showers. She follows up regularly with endocrinology.    CHRONIC KIDNEY DISEASE AND ANEMIA:  She reports ongoing management of chronic kidney disease with recent improvement in kidney function. She receives intermittent epogen injections for anemia, with the last injection in August. At her most recent hematology appointment, her levels were improved, and treatment was withheld. She is scheduled for follow-up testing to reassess her anemia status. She confirms follow-up visits with nephrology for continued monitoring.    MUSCULOSKELETAL ISSUES:  She reports ongoing difficulties with arthritis, particularly affecting her knees and mobility.  "She confirms seeing an orthopedist for management. She denies any foot wounds. She also reports mild bilateral lower extremity edema, managed with support stockings.    SLEEP APNEA:  She uses a CPAP machine nightly for sleep apnea, reporting that it helps and her breathing has been comfortable overall while sleeping.    HEARING:  She reports stable hearing with hearing aids in both ears, which are working well. She regularly follows up with her audiologist.    PAST MEDICAL HISTORY:  She reports a history of recent Campylobacter diarrhea she had gi follow up  now resolved, and a history of gout without recent flares.      ROS:  Constitutional: -chills, -fever, -weight change  Respiratory: -cough, -chest tightness, -shortness of breath, -wheezing  Cardiovascular: -chest pain, -palpitations  Gastrointestinal: -abdominal pain, -constipation, -nausea, -vomiting, -diarrhea  Neurological: -dizziness, -weakness  Skin: +rash, +itching  Musculoskeletal: +joint pain  Ears: +hearing loss        Review of Systems    Objective:      Blood pressure 138/62, pulse 75, height 5' 5" (1.651 m), weight 134.8 kg (297 lb 2.9 oz), SpO2 100%.   Physical Exam        Physical Exam    Constitutional: No acute distress.  HENT: Normocephalic. Oropharynx is clear. Wearing bilateral hearing aids.  Eyes: No scleral icterus.  Cardiovascular: Normal rate and rhythm. Normal heart sounds. Evelyn   Lungs: Normal pulmonary effort. No respiratory distress. Normal breath sounds.  Abdomen: Bowel sounds are normal. Soft. No mass. No abdominal tenderness. Umbilicus appears clean. Slight irritation at old surgery site on abdomen. No drainage or erythema on abdomen.  Musculoskeletal: Neck supple. Ambulates with walker.  Skin: No erythema.  Neurological: Alert.  Psychiatric: Mood normal.  Vitals: Blood pressure: 138/62.  Extremities: Trace to +1 edema in bilateral lower extremities. Wearing support stockings.  Creptius knees          Assessment:       1. Primary " hypertension    2. Type 2 diabetes mellitus with stage 3b chronic kidney disease and hypertension    3. Encounter for screening mammogram for breast cancer    4. RUFINA (obstructive sleep apnea)    5. History of stroke    6. Rash and nonspecific skin eruption    7. Primary osteoarthritis of right knee    8. Stage 3b chronic kidney disease    9. Anemia due to chronic kidney disease, unspecified CKD stage            Assessment & Plan    Assessed blood pressure control, noting improvement with current regimen after discontinuation of irbesartan due to previous angioedema  Evaluated diabetes management  Reviewed recent labs, noting improved kidney function despite chronic kidney disease  Considered history of anemia, managed by hematologist Dr. Kwan with intermittent Inpogen injections  Assessed resolved Campylobacter diarrhea from previous visit  Evaluated irritation in patient's belly button, noting no signs of infection or drainage    COVID-19 VACCINATION:  - Educated on potential side effects of COVID-19 booster, including flu-like symptoms and body aches.  - Recommend getting COVID-19 booster at a local drugstore to receive Moderna vaccine.    HYPERTENSION:  - Annika to continue monitoring blood pressure at home.  Contiue current medications     DIABETES:  - Annika to maintain current diabetes management, including avoiding late-night eating to prevent evening blood sugar elevations.  - Continued Jardiance 10mg daily, Trulicity weekly, Humalog with meals (13 units with sliding scale), and Tresiba 45 units nightly.  - Hemoglobin A1C ordered (to be added to upcoming labs in November).  Endocrinology follow up when due     SLEEP APNEA:  - Annika to continue using CPAP for sleep apnea management.    UMBILICAl rash continue to keep clean   - Started Bactroban cream for application to belly button to prevent infection.  - Contact the office if belly button irritation worsens or does not improve with  Bactroban.    MEDICATIONS/SUPPLEMENTS:  - Continued amlodipine 10mg daily, chlorthalidone 25mg daily, labetalol 300mg twice daily.    BREAST CANCER SCREENING:  - Mammogram ordered for mid-December.    LABS:  - Lipid panel ordered (to be added to upcoming labs in November).    FOLLOW UP:  - Follow up in 4 months.          Plan:       Annika was seen today for follow-up.    Diagnoses and all orders for this visit:    Primary hypertension    Type 2 diabetes mellitus with stage 3b chronic kidney disease and hypertension  -     Hemoglobin A1C; Future  -     Lipid Panel; Future    Encounter for screening mammogram for breast cancer  -     Mammo Digital Screening Bilat w/ Bobo; Future    RUFINA (obstructive sleep apnea)    History of stroke    Rash and nonspecific skin eruption    Primary osteoarthritis of right knee    Stage 3b chronic kidney disease    Anemia due to chronic kidney disease, unspecified CKD stage    Other orders  -     mupirocin (BACTROBAN) 2 % ointment; Apply topically 3 (three) times daily.            Visit today included increased complexity associated with the care of the problems, hypertension angioedema, type 2 diabetes, anemia, osteoarthirtis rash  addressed and managing the longitudinal care of the patient due to the serious and/or complex managed problem(s).     This note was generated with the assistance of ambient listening technology. Verbal consent was obtained by the patient and accompanying visitor(s) for the recording of patient appointment to facilitate this note. I attest to having reviewed and edited the generated note for accuracy, though some syntax or spelling errors may persist. Please contact the author of this note for any clarification.

## 2024-10-30 ENCOUNTER — OFFICE VISIT (OUTPATIENT)
Dept: PODIATRY | Facility: CLINIC | Age: 74
End: 2024-10-30
Payer: MEDICARE

## 2024-10-30 VITALS
BODY MASS INDEX: 49.45 KG/M2 | HEART RATE: 83 BPM | HEIGHT: 65 IN | DIASTOLIC BLOOD PRESSURE: 68 MMHG | SYSTOLIC BLOOD PRESSURE: 139 MMHG

## 2024-10-30 DIAGNOSIS — E11.42 DIABETIC POLYNEUROPATHY ASSOCIATED WITH TYPE 2 DIABETES MELLITUS: Primary | ICD-10-CM

## 2024-10-30 DIAGNOSIS — B35.1 ONYCHOMYCOSIS DUE TO DERMATOPHYTE: ICD-10-CM

## 2024-10-30 DIAGNOSIS — L84 CORN OR CALLUS: ICD-10-CM

## 2024-10-30 PROCEDURE — 99999 PR PBB SHADOW E&M-EST. PATIENT-LVL IV: CPT | Mod: PBBFAC,,, | Performed by: PODIATRIST

## 2024-11-04 ENCOUNTER — LAB VISIT (OUTPATIENT)
Dept: LAB | Facility: HOSPITAL | Age: 74
End: 2024-11-04
Attending: INTERNAL MEDICINE
Payer: MEDICARE

## 2024-11-04 ENCOUNTER — OFFICE VISIT (OUTPATIENT)
Dept: HEMATOLOGY/ONCOLOGY | Facility: CLINIC | Age: 74
End: 2024-11-04
Payer: MEDICARE

## 2024-11-04 ENCOUNTER — INFUSION (OUTPATIENT)
Dept: INFUSION THERAPY | Facility: HOSPITAL | Age: 74
End: 2024-11-04
Payer: MEDICARE

## 2024-11-04 ENCOUNTER — HOSPITAL ENCOUNTER (EMERGENCY)
Facility: HOSPITAL | Age: 74
Discharge: HOME | End: 2024-11-04
Attending: EMERGENCY MEDICINE
Payer: MEDICARE

## 2024-11-04 VITALS
HEIGHT: 65 IN | BODY MASS INDEX: 48.6 KG/M2 | OXYGEN SATURATION: 98 % | WEIGHT: 291.69 LBS | TEMPERATURE: 98 F | HEART RATE: 67 BPM | SYSTOLIC BLOOD PRESSURE: 123 MMHG | DIASTOLIC BLOOD PRESSURE: 98 MMHG

## 2024-11-04 VITALS
WEIGHT: 281 LBS | RESPIRATION RATE: 16 BRPM | DIASTOLIC BLOOD PRESSURE: 65 MMHG | OXYGEN SATURATION: 99 % | SYSTOLIC BLOOD PRESSURE: 131 MMHG | HEART RATE: 77 BPM | HEIGHT: 65 IN | BODY MASS INDEX: 46.82 KG/M2 | TEMPERATURE: 98 F

## 2024-11-04 DIAGNOSIS — D63.1 ANEMIA IN STAGE 3B CHRONIC KIDNEY DISEASE: ICD-10-CM

## 2024-11-04 DIAGNOSIS — N18.32 ANEMIA IN STAGE 3B CHRONIC KIDNEY DISEASE: ICD-10-CM

## 2024-11-04 DIAGNOSIS — M25.561 RIGHT KNEE PAIN: ICD-10-CM

## 2024-11-04 DIAGNOSIS — N18.32 ANEMIA IN STAGE 3B CHRONIC KIDNEY DISEASE: Primary | ICD-10-CM

## 2024-11-04 DIAGNOSIS — E66.01 MORBID OBESITY: ICD-10-CM

## 2024-11-04 DIAGNOSIS — N18.9 ANEMIA OF RENAL DISEASE: Primary | ICD-10-CM

## 2024-11-04 DIAGNOSIS — I12.9 TYPE 2 DIABETES MELLITUS WITH STAGE 3B CHRONIC KIDNEY DISEASE AND HYPERTENSION: ICD-10-CM

## 2024-11-04 DIAGNOSIS — D63.1 ANEMIA IN STAGE 3B CHRONIC KIDNEY DISEASE: Primary | ICD-10-CM

## 2024-11-04 DIAGNOSIS — D63.1 ANEMIA OF RENAL DISEASE: Primary | ICD-10-CM

## 2024-11-04 DIAGNOSIS — E11.22 TYPE 2 DIABETES MELLITUS WITH STAGE 3B CHRONIC KIDNEY DISEASE AND HYPERTENSION: ICD-10-CM

## 2024-11-04 DIAGNOSIS — N18.32 TYPE 2 DIABETES MELLITUS WITH STAGE 3B CHRONIC KIDNEY DISEASE AND HYPERTENSION: ICD-10-CM

## 2024-11-04 LAB
ALBUMIN SERPL BCP-MCNC: 3.4 G/DL (ref 3.5–5.2)
ALP SERPL-CCNC: 95 U/L (ref 40–150)
ALT SERPL W/O P-5'-P-CCNC: 13 U/L (ref 10–44)
ANION GAP SERPL CALC-SCNC: 6 MMOL/L (ref 8–16)
AST SERPL-CCNC: 22 U/L (ref 10–40)
BASOPHILS # BLD AUTO: 0.06 K/UL (ref 0–0.2)
BASOPHILS NFR BLD: 1 % (ref 0–1.9)
BILIRUB SERPL-MCNC: 0.3 MG/DL (ref 0.1–1)
BUN SERPL-MCNC: 32 MG/DL (ref 8–23)
CALCIUM SERPL-MCNC: 9.4 MG/DL (ref 8.7–10.5)
CHLORIDE SERPL-SCNC: 109 MMOL/L (ref 95–110)
CO2 SERPL-SCNC: 26 MMOL/L (ref 23–29)
CREAT SERPL-MCNC: 1.9 MG/DL (ref 0.5–1.4)
DIFFERENTIAL METHOD BLD: ABNORMAL
EOSINOPHIL # BLD AUTO: 0.4 K/UL (ref 0–0.5)
EOSINOPHIL NFR BLD: 6.7 % (ref 0–8)
ERYTHROCYTE [DISTWIDTH] IN BLOOD BY AUTOMATED COUNT: 14.6 % (ref 11.5–14.5)
EST. GFR  (NO RACE VARIABLE): 27.4 ML/MIN/1.73 M^2
FERRITIN SERPL-MCNC: 267 NG/ML (ref 20–300)
GLUCOSE SERPL-MCNC: 80 MG/DL (ref 70–110)
HCT VFR BLD AUTO: 32 % (ref 37–48.5)
HGB BLD-MCNC: 9.9 G/DL (ref 12–16)
IMM GRANULOCYTES # BLD AUTO: 0.02 K/UL (ref 0–0.04)
IMM GRANULOCYTES NFR BLD AUTO: 0.3 % (ref 0–0.5)
LYMPHOCYTES # BLD AUTO: 1.7 K/UL (ref 1–4.8)
LYMPHOCYTES NFR BLD: 27.8 % (ref 18–48)
MCH RBC QN AUTO: 27.7 PG (ref 27–31)
MCHC RBC AUTO-ENTMCNC: 30.9 G/DL (ref 32–36)
MCV RBC AUTO: 89 FL (ref 82–98)
MONOCYTES # BLD AUTO: 0.7 K/UL (ref 0.3–1)
MONOCYTES NFR BLD: 10.7 % (ref 4–15)
NEUTROPHILS # BLD AUTO: 3.3 K/UL (ref 1.8–7.7)
NEUTROPHILS NFR BLD: 53.5 % (ref 38–73)
NRBC BLD-RTO: 0 /100 WBC
PLATELET # BLD AUTO: 223 K/UL (ref 150–450)
PMV BLD AUTO: 10.8 FL (ref 9.2–12.9)
POTASSIUM SERPL-SCNC: 4 MMOL/L (ref 3.5–5.1)
PROT SERPL-MCNC: 6 G/DL (ref 6–8.4)
RBC # BLD AUTO: 3.58 M/UL (ref 4–5.4)
SODIUM SERPL-SCNC: 141 MMOL/L (ref 136–145)
WBC # BLD AUTO: 6.09 K/UL (ref 3.9–12.7)

## 2024-11-04 PROCEDURE — 63600175 PHARM REV CODE 636 W HCPCS: Mod: JZ,EC,JG | Performed by: NURSE PRACTITIONER

## 2024-11-04 PROCEDURE — 3074F SYST BP LT 130 MM HG: CPT | Mod: CPTII,S$GLB,, | Performed by: NURSE PRACTITIONER

## 2024-11-04 PROCEDURE — 96372 THER/PROPH/DIAG INJ SC/IM: CPT

## 2024-11-04 PROCEDURE — 1125F AMNT PAIN NOTED PAIN PRSNT: CPT | Mod: CPTII,S$GLB,, | Performed by: NURSE PRACTITIONER

## 2024-11-04 PROCEDURE — 1101F PT FALLS ASSESS-DOCD LE1/YR: CPT | Mod: CPTII,S$GLB,, | Performed by: NURSE PRACTITIONER

## 2024-11-04 PROCEDURE — 3288F FALL RISK ASSESSMENT DOCD: CPT | Mod: CPTII,S$GLB,, | Performed by: NURSE PRACTITIONER

## 2024-11-04 PROCEDURE — 99999 PR PBB SHADOW E&M-EST. PATIENT-LVL V: CPT | Mod: PBBFAC,,, | Performed by: NURSE PRACTITIONER

## 2024-11-04 PROCEDURE — 80053 COMPREHEN METABOLIC PANEL: CPT | Performed by: INTERNAL MEDICINE

## 2024-11-04 PROCEDURE — 3051F HG A1C>EQUAL 7.0%<8.0%: CPT | Mod: CPTII,S$GLB,, | Performed by: NURSE PRACTITIONER

## 2024-11-04 PROCEDURE — 82728 ASSAY OF FERRITIN: CPT | Performed by: INTERNAL MEDICINE

## 2024-11-04 PROCEDURE — 3008F BODY MASS INDEX DOCD: CPT | Mod: CPTII,S$GLB,, | Performed by: NURSE PRACTITIONER

## 2024-11-04 PROCEDURE — 3080F DIAST BP >= 90 MM HG: CPT | Mod: CPTII,S$GLB,, | Performed by: NURSE PRACTITIONER

## 2024-11-04 PROCEDURE — 4010F ACE/ARB THERAPY RXD/TAKEN: CPT | Mod: CPTII,S$GLB,, | Performed by: NURSE PRACTITIONER

## 2024-11-04 PROCEDURE — 99283 EMERGENCY DEPT VISIT LOW MDM: CPT | Mod: 25

## 2024-11-04 PROCEDURE — 85025 COMPLETE CBC W/AUTO DIFF WBC: CPT | Performed by: INTERNAL MEDICINE

## 2024-11-04 PROCEDURE — 3066F NEPHROPATHY DOC TX: CPT | Mod: CPTII,S$GLB,, | Performed by: NURSE PRACTITIONER

## 2024-11-04 PROCEDURE — G2211 COMPLEX E/M VISIT ADD ON: HCPCS | Mod: S$GLB,,, | Performed by: NURSE PRACTITIONER

## 2024-11-04 PROCEDURE — 99214 OFFICE O/P EST MOD 30 MIN: CPT | Mod: S$GLB,,, | Performed by: NURSE PRACTITIONER

## 2024-11-04 PROCEDURE — 3060F POS MICROALBUMINURIA REV: CPT | Mod: CPTII,S$GLB,, | Performed by: NURSE PRACTITIONER

## 2024-11-04 RX ORDER — HYDROCODONE BITARTRATE AND ACETAMINOPHEN 5; 325 MG/1; MG/1
1 TABLET ORAL EVERY 6 HOURS PRN
Qty: 12 TABLET | Refills: 0 | Status: SHIPPED | OUTPATIENT
Start: 2024-11-04 | End: 2024-11-07

## 2024-11-04 RX ADMIN — EPOETIN ALFA-EPBX 40000 UNITS: 40000 INJECTION, SOLUTION INTRAVENOUS; SUBCUTANEOUS at 02:11

## 2024-11-04 NOTE — Clinical Note
This is a patient of Dr. Kwan's that he sees for benign hematology.  She is in need of help with transportation for her appointments and injections.  Is there someone I can reach out to for this?  Thank you.  Juhi

## 2024-11-04 NOTE — PROGRESS NOTES
Subjective:       Patient ID: Annika Mac is a 73 y.o. female.    Chief Complaint: No chief complaint on file.    Anemia       Mrs. Mac returns today for follow up.  She last saw Dr. Rodriguez on 09/25/24 and he had continued to hold the erythropoietin at that time d/t Hgb >10.   She presents today with repeat labs. Her energy level is stable. She denies bleeding. No chest pain, does have sob with exertion.  Eating and drinking well.  No black stools or blood in toilet.  No fevers recently.  Taking tylenol occasionally for her knee pain. Notes worsening right knee pain and swelling    Briefly, she is a 73 year old morbidly obese female who comes today for follow up for her longstanding anemia.    Multiple stool samples in the past have been negative for occult blood.   Of note, she had an EGD, colonoscopy, and video capsule swallow 4 years ago.  A tubular adenoma was removed from her colon while her EGD had shown patchy mildly erythematous mucosa without bleeding in the gastric body.  Biopsies were negative for malignancy.  The video capsule swallow was unremarkable although it was not an optimal study.    Her CBC today shows a white count of 6,090 per cubic mm, hemoglobin 9.9 grams/deciliter, hematocrit 32 %, MCV 89, and platelets 223K.  Her creatinine is 1.9 mg/dL, BUN is 32 and eGFR is 27.4 mL/minute  She has brought in a stool card with 3 stool samples today and these are negative    A recent EGD in April 2023 was normal.  Colonoscopy showed 4 polyps that were removed. Biopsy of all 4 of them showed tubular adenomas.  As mentioned above, several stool samples so far have been negative for occult blood.    Review of Systems  new right sided knee pain, sever, flared up, scheduled to see November 12th orthopedist,  has her stool card today.      she feels fair. She again complains of her longstanding arthritic pains involving primarily her knees, her back, and her hips.  Right knee pain is worsening.  She uses  a wheelchair for ambulation.  She complains of shortness of breath with exertion.  She denies any anxiety, depression, easy bruising, fevers, chills, night sweats, weight loss, nausea, vomiting, diarrhea, diplopia, blurred vision, epistaxis, hematuria, or headaches.      Objective:      Physical Exam  GENERAL: She is alert, oriented to time, place, person, pleasant, well nourished, in no acute physical distress.  She is able to climb to the examination table with assistance.  VITAL SIGNS: Reviewed.   HEENT: Normal. There are no nasal, oral, lip, gingival, auricular, lid, conjunctival lesions. Pupils are equal, reactive to light and   accommodation and extraocular muscle movements are intact. Mucosae are moist and pink, and there is no thrush.  Maxillary teeth are missing.  NECK: Supple without JVD, adenopathy, or thyromegaly.   LUNGS: Clear to auscultation without wheezing, rales, or rhonchi.   CARDIOVASCULAR: Reveals an S1, S2, no murmurs, no rubs, no gallops.   ABDOMEN: Obese, soft, nontender, without organomegaly. Bowel sounds are present. A median abdominal scar is seen extending from the umbilicus to the symphysis pubis.   EXTREMITIES: No cyanosis or clubbing. There is 1(+) edema at the ankle level bilaterally. Swelling, tenderness, and warmth noted to right knee.  SKIN: Does not have petechiae, rashes, induration, or ecchymoses.   NEUROLOGIC: Motor function is 5/5, DTRs are 0 to 1+ bilaterally, symmetrical, and cranial nerves within normal limits.   LYMPHATIC: There is no cervical, axillary, or supraclavicular adenopathy.       Assessment:       1. Anemia in stage 3 chronic kidney disease      2. Morbid obesity due to excess calories     3. Type 2 DM with CKD stage 3 and hypertension            Plan:     I had a long discussion with Mrs. Mac.      Her hgb is 9.9 today so able to get erythropoietin today.  Stool card today is negative.  Will discuss with Dr. Rodriguez, if he wants to do weekly x 3 again  Pt  is asking about help with transportation for visits.  Will reach out to social work team    Her multiple questions were answered to her satisfaction.  I spent approximately 35 minutes reviewing the available records and evaluating the patient, out of which over 50% of the time was spent face to face with the patient in counseling and coordinating this patient's care.    Route Chart for Scheduling    Med Onc Chart Routing  Urgent    Follow up with physician 4 weeks. with Dr. Kwan   Follow up with KATIE    Infusion scheduling note    Injection scheduling note    Labs CBC, CMP and ferritin   Scheduling:  Preferred lab:  Lab interval:  in 4 weeks   Imaging    Pharmacy appointment    Other referrals                  Supportive Plan Information  OP EPOETIN ASAF WEEKLY Ivan Rodriguez MD   Associated Diagnosis: Anemia of renal disease   noted on 9/21/2020   Line of treatment: Supportive Care   Treatment goal: Supportive     Upcoming Treatment Dates - OP EPOETIN ASAF WEEKLY    6/19/2024       Medications       epoetin asaf-epbx injection 40,000 Units    Therapy Plan Information  INJECTAFER (FERRIC CARBOXYMALTOSE) for Iron deficiency anemia, noted on 2/26/2018  INJECTAFER (FERRIC CARBOXYMALTOSE) for Anemia of renal disease, noted on 9/21/2020  None      No therapy plan of the specified type found.    No therapy plan of the specified type found.

## 2024-11-04 NOTE — ED PROVIDER NOTES
Encounter Date: 2024       History     Chief Complaint   Patient presents with    Knee Pain     R knee pain and swelling, denies injury, ortho nicholas      Pt is a 72 yo F with PMH of OA, provoked DVT, PAD, COPD who presents with R knee pain. Knee has been throbbing for several days. 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 She does not recall having this before.     The history is provided by the patient.     Review of patient's allergies indicates:   Allergen Reactions    Clindamycin Hives and Swelling    Iodinated contrast media Rash     Past Medical History:   Diagnosis Date    Allergy     Angio-edema     Asthma     Chronic obstructive pulmonary disease, unspecified COPD type 2022    Constipation 10/03/2019    Deep vein thrombophlebitis of left leg 2012    Deep vein thrombosis     when she had a knee replacement    Diabetic foot ulcer with osteomyelitis     Encounter for blood transfusion     anemic     GERD (gastroesophageal reflux disease)     Obesity, diabetes, and hypertension syndrome 2019    Obstructive sleep apnea 2012    PAD (peripheral artery disease) 2013    Pressure ulcer of toe of left foot     Type 2 diabetes mellitus with obesity 2020    Type 2 diabetes mellitus with peripheral artery disease 2020     Past Surgical History:   Procedure Laterality Date    CATARACT EXTRACTION W/  INTRAOCULAR LENS IMPLANT Left 3/2002    left     CATARACT EXTRACTION W/  INTRAOCULAR LENS IMPLANT Right     OD dr. olivares     SECTION      COLONOSCOPY N/A 2018    Procedure: COLONOSCOPY;  Surgeon: Faizan Mac MD;  Location: [address]);  Service: Endoscopy;  Laterality: N/A;    COLONOSCOPY N/A 2023    Procedure: COLONOSCOPY;  Surgeon: Pa Zamora MD;  Location: [address]);  Service: Endoscopy;  Laterality: N/A;    CYST REMOVAL  2011    left side of face    CYSTOSCOPY W/  RETROGRADES Left 2/26/2020    Procedure: CYSTOSCOPY, WITH RETROGRADE PYELOGRAM;  Surgeon: Noman Rivas MD;  Location: [facility] OR 1ST FLR;  Service: Urology;  Laterality: Left;  30min    DE QUERVAIN'S RELEASE  1/8/2021    Procedure: RELEASE, HAND, FOR DEQUERVAIN'S TENOSYNOVITIS;  Surgeon: Marky Hagen MD;  Location: [facility];  Service: Orthopedics;;    ESOPHAGOGASTRODUODENOSCOPY N/A 4/26/2023    Procedure: EGD (ESOPHAGOGASTRODUODENOSCOPY);  Surgeon: Pa Zamora MD;  Location: [facility] ENDO (2ND FLR);  Service: Endoscopy;  Laterality: N/A;  suprep-[facility] mail-bmi [id number]-tb  4/20/23- No answer for precall- [place]    EYE SURGERY Bilateral 2012    eye implants    GANGLION CYST EXCISION  11/13/2007    Right wrist    INJECTION OF ANESTHETIC AGENT AROUND MEDIAL BRANCH NERVES INNERVATING LUMBAR FACET JOINT Bilateral 4/5/2022    Procedure: Block-nerve-medial branch-lumbar bilateral L4-5 and L5-S1;  Surgeon: Alireza Meraz Jr., MD;  Location: [facility] PAIN MGT;  Service: Pain Management;  Laterality: Bilateral;  pt is diabetic   asa    INJECTION OF ANESTHETIC AGENT AROUND MEDIAL BRANCH NERVES INNERVATING LUMBAR FACET JOINT Bilateral 4/19/2022    Procedure: Block-nerve-medial branch-lumbar bilaterl L4-5 and L5-S1;  Surgeon: Alireza Meraz Jr., MD;  Location: [facility] PAIN MGT;  Service: Pain Management;  Laterality: Bilateral;  pt is diabetic     INJECTION OF FACET JOINT Bilateral 5/6/2021    Procedure: INJECTION, FACET JOINT--Bilateral L4-5, L5-S1;  Surgeon: Alireza Meraz Jr., MD;  Location: [facility] PAIN MGT;  Service: Pain Management;  Laterality: Bilateral;  Oral    INTERPOSITION ARTHROPLASTY OF CARPOMETACARPAL JOINTS Left 1/8/2021    Procedure: INTERPOSITION ARTHROPLASTY, CMC JOINT - left dequervains and cmc arthroplasty, arthrex;  Surgeon: Marky Hagen MD;  Location: [facility];  Service: Orthopedics;  Laterality: Left;    JOINT REPLACEMENT Left     Pan Retinal Photocoagulation Bilateral     Dr. Monterroso (Proliferative Diabetic  Retinopathy)    RADIOFREQUENCY ABLATION OF LUMBAR MEDIAL BRANCH NERVE AT SINGLE LEVEL Bilateral 12/20/2023    Procedure: Radiofrequency Ablation, Nerve, Spinal, Lumbar, bilateral L4/5 L5/S1;  Surgeon: Alejandrina Roa DO;  Location: [facility] PAIN MANAGEMENT;  Service: Pain Management;  Laterality: Bilateral;  diabetic  asa    RADIOFREQUENCY THERMOCOAGULATION Right 5/12/2022    Procedure: RADIOFREQUENCY THERMAL COAGULATION RIGHT L4-5, L5-S1 (IV Sedation);  Surgeon: Alireza Meraz Jr., MD;  Location: [facility] PAIN MGT;  Service: Pain Management;  Laterality: Right;  diabetic    RADIOFREQUENCY THERMOCOAGULATION Left 5/19/2022    Procedure: RADIOFREQUENCY THERMAL COAGULATION LEFT L4-5, L5-S1 (IV Sedation);  Surgeon: Alireza Meraz Jr., MD;  Location: [facility] PAIN T;  Service: Pain Management;  Laterality: Left;  diabetic    TOTAL ABDOMINAL HYSTERECTOMY  1982    TOTAL KNEE ARTHROPLASTY  2/2006    left    TRIGGER FINGER RELEASE  9/18/2009     Family History   Problem Relation Name Age of Onset    Diabetes Mother      Hypertension Mother      Heart disease Father      Diabetes Sister X4     No Known Problems Sister unknown hx     No Known Problems Brother X2-unknown hx     Thyroid disease Brother X4     Diabetes Brother X4     Hypertension Brother X4     No Known Problems Daughter      No Known Problems Daughter      No Known Problems Son      Amblyopia Neg Hx      Blindness Neg Hx      Glaucoma Neg Hx      Macular degeneration Neg Hx      Retinal detachment Neg Hx      Strabismus Neg Hx      Colon cancer Neg Hx      Esophageal cancer Neg Hx       Social History     Tobacco Use    Smoking status: Never     Passive exposure: Past    Smokeless tobacco: Never   Substance Use Topics    Alcohol use: No    Drug use: No         Physical Exam     Initial Vitals [11/04/24 1525]   BP Pulse Resp Temp SpO2   139/66 72 20 98.7 °F (37.1 °C) 99 %      MAP       --         Physical Exam    Nursing note and vitals  reviewed.  Constitutional: She appears well-developed and well-nourished. She is not diaphoretic. No distress.   HENT:   Head: Normocephalic and atraumatic.   Eyes: Conjunctivae and EOM are normal.   Neck: Neck supple.   Normal range of motion.  Cardiovascular:  Normal rate and regular rhythm.           Pulmonary/Chest: No respiratory distress.   Abdominal: She exhibits no distension.   Musculoskeletal:         General: Tenderness (legs appear symmetric in size) present. No edema. Normal range of motion.      Cervical back: Normal range of motion and neck supple.      Comments: Anterior tenderness, no deformity, mild effusion     Neurological: She is alert and oriented to person, place, and time. GCS score is 15. GCS eye subscore is 4. GCS verbal subscore is 5. GCS motor subscore is 6.   Skin: Skin is warm and dry.   Psychiatric: She has a normal mood and affect. Her behavior is normal. Judgment and thought content normal.         ED Course   Procedures  Labs Reviewed - No data to display       Imaging Results              X-Ray Knee 3 View Right (Final result)  Result time 11/04/24 18:09:28      Final result by Travis Dalal MD (11/04/24 18:09:28)                   Impression:      Suspected nonspecific suprapatellar joint effusion new from prior, without acute displaced fracture-dislocation identified.    DJD.      Electronically signed by: Travis Dalal MD  Date:    11/04/2024  Time:    18:09               Narrative:    EXAMINATION:  XR KNEE 3 VIEW RIGHT    CLINICAL HISTORY:  Pain in right knee    TECHNIQUE:  AP, lateral, and Merchant views of the right knee were performed.    COMPARISON:  Right knee series 02/28/2023    FINDINGS:  Generalized osteopenia.  Overall alignment similar to prior.  Moderate to advanced tricompartmental degenerative change not significantly progressed from prior.  Suspected nonspecific suprapatellar joint effusion.  No displaced fracture, dislocation or destructive osseous process.   No subcutaneous emphysema or radiopaque foreign body.                                       Medications - No data to display  Medical Decision Making  DDX: fractures, sprain, ligament or tendon injury, meniscal tear, bakers cyst    No signs of septic joint  No trauma or injury to indicate occult fracture (xray neg)  No signs of DVT  Will refer her to ortho/sports med  Ice and nsaids now  Discharged to home in stable condition, return to ED warnings given, follow up and patient care instructions given.        Amount and/or Complexity of Data Reviewed  Radiology: ordered.    Risk  Prescription drug management.                                      Clinical Impression:  Final diagnoses:  [M25.561] Right knee pain          ED Disposition Condition    Discharge Stable          ED Prescriptions       Medication Sig Dispense Start Date End Date Auth. Provider    HYDROcodone-acetaminophen (NORCO) 5-325 mg per tablet Take 1 tablet by mouth every 6 (six) hours as needed for Pain. 12 tablet 11/4/2024 11/7/2024 Luly Martinez MD          Follow-up Information       Follow up With Specialties Details Why Contact Info Additional Information    [facility] [address] [facility]   [address], [address]  [facility] [address] [phone] [facility] - Main Building, 5th Floor Please park in [facility] and take [address] elevator             Luly Martinez MD  11/06/24 [id number]

## 2024-11-04 NOTE — ED NOTES
Patient identifiers verified and correct for Annika Mac  LOC: The patient is awake, alert and aware of environment with an appropriate affect, the patient is oriented x 3 and speaking appropriately.   APPEARANCE: Patient appears comfortable and in no acute distress, patient is clean and well groomed.  SKIN: The skin is warm and dry, color consistent with ethnicity, patient has normal skin turgor and moist mucus membranes, skin intact, no breakdown or bruising noted.   MUSCULOSKELETAL: Patient moving all extremities spontaneously.  Palpation tenderness to entire right knee area.

## 2024-11-04 NOTE — Clinical Note
I saw Mrs Mac yesterday.  Her hgb was 9.9 so I let her get her epogen injection.  It looks like last time you did once weekly dosing x 3.  Do you want this again for her?  As of now, I have her coming back to see you in 4 weeks with labs, but can adjust this if needed.  Thanks.  Juhi

## 2024-11-04 NOTE — ED TRIAGE NOTES
Pt c/o right knee pain and swelling since yesterday evening. STates she has had some mild pain with walking in the recent past.  Pt has appt with ortho 11/11 but was advised by her MD to come to ED.

## 2024-11-04 NOTE — FIRST PROVIDER EVALUATION
"Medical screening examination initiated.  I have conducted a focused provider triage encounter, findings are as follows:    Brief history of present illness:  74yo F with Hx gout and OA of R knee presenting with R knee pain. +swelling in R knee. No injury. Sent by heme/onc clinic due to severe pain in the knee. Scheduled to see ortho 11/11.     Vitals:    11/04/24 1525   BP: 139/66   Pulse: 72   Resp: 20   Temp: 98.7 °F (37.1 °C)   TempSrc: Oral   SpO2: 99%   Weight: 127.5 kg (281 lb)   Height: 5' 5" (1.651 m)       Pertinent physical exam:  In wheelchair      Preliminary workup initiated; this workup will be continued and followed by the physician or advanced practice provider that is assigned to the patient when roomed.  "

## 2024-11-05 ENCOUNTER — OFFICE VISIT (OUTPATIENT)
Dept: ORTHOPEDICS | Facility: CLINIC | Age: 74
End: 2024-11-05
Payer: MEDICARE

## 2024-11-05 VITALS — HEIGHT: 65 IN | WEIGHT: 276.69 LBS | BODY MASS INDEX: 46.1 KG/M2

## 2024-11-05 DIAGNOSIS — E66.01 MORBID OBESITY: ICD-10-CM

## 2024-11-05 DIAGNOSIS — M17.11 PRIMARY OSTEOARTHRITIS OF RIGHT KNEE: Primary | ICD-10-CM

## 2024-11-05 PROCEDURE — 99999 PR PBB SHADOW E&M-EST. PATIENT-LVL V: CPT | Mod: PBBFAC,,, | Performed by: PHYSICIAN ASSISTANT

## 2024-11-05 RX ORDER — BETAMETHASONE SODIUM PHOSPHATE AND BETAMETHASONE ACETATE 3; 3 MG/ML; MG/ML
6 INJECTION, SUSPENSION INTRA-ARTICULAR; INTRALESIONAL; INTRAMUSCULAR; SOFT TISSUE
Status: COMPLETED | OUTPATIENT
Start: 2024-11-05 | End: 2024-11-05

## 2024-11-05 RX ADMIN — BETAMETHASONE SODIUM PHOSPHATE AND BETAMETHASONE ACETATE 6 MG: 3; 3 INJECTION, SUSPENSION INTRA-ARTICULAR; INTRALESIONAL; INTRAMUSCULAR; SOFT TISSUE at 05:11

## 2024-11-05 NOTE — DISCHARGE INSTRUCTIONS
Your X-ray shows signs of tricompartmental arthritis. I recommend close follow up with the orthopedic doctor.  You may need further imaging with an MRI if your symptoms persist.    You can elevate your knee. Use ice to help with pain and inflammation.     Continue taking Tylenol for pain.   You can take Tylenol 650 mg every 8 hours for pain.  I have prescribed Norco, an opiate pain medication for severe pain. Do not drive, drink alcohol, take other sedating medications while taking opiate pain medication. It is also constipating, so please take a  stool softener while taking this medication.

## 2024-11-05 NOTE — PROGRESS NOTES
"  SUBJECTIVE:     Chief Complaint & History of Present Illness:  Annika Mac is a Established patient 73 y.o. female who is seen here today with a complaint of    Chief Complaint   Patient presents with    Right Knee - Injury, Pain    .  She has patient well-known to me was last seen treated the clinic 11/08/2022 for her arthritic knees at that time she had undergone cortisone injection with very good relief.  She is aware she has severe arthritis in the knee and is in need of knee replacement surgery she is status post left TKA performed many years ago.  She spoke with Dr. Linares 12/01/2022 to discuss knee replacement surgery but needs to get her BMI below 45.  She is currently at 46.04 and has been steadily losing weight.  On a scale of 1-10, with 10 being worst pain imaginable, he rates this pain as 4 on good days and 7 on bad days.  she describes the pain as sore and achy.    Review of patient's allergies indicates:   Allergen Reactions    Clindamycin Hives and Swelling    Iodinated contrast media Rash         Current Outpatient Medications   Medication Sig Dispense Refill    albuterol (PROAIR HFA) 90 mcg/actuation inhaler Inhale 2 puffs into the lungs every 6 (six) hours as needed for Wheezing. Rescue 18 g 6    allopurinoL (ZYLOPRIM) 300 MG tablet       amLODIPine (NORVASC) 10 MG tablet Take 1 tablet (10 mg total) by mouth once daily. 90 tablet 2    aspirin 81 MG Chew Take 1 tablet (81 mg total) by mouth once daily.  0    atorvastatin (LIPITOR) 80 MG tablet Take 1 tablet (80 mg total) by mouth every evening. 30 tablet 11    BD ULTRA-FINE KIKA PEN NEEDLE 32 gauge x 5/32" Ndle USE 4 TIMES DAILY 200 each 20    blood sugar diagnostic Strp 1 strip 4 times daily. Contour Next. 200 strip 11    blood-glucose meter kit 1 each by Other route once daily. Use daily. Insurance proffered one touch  E11.42 1 each 0    blood-glucose sensor (DEXCOM G7 SENSOR MISC) by Misc.(Non-Drug; Combo Route) route.      blood-glucose sensor " (DEXCOM G7 SENSOR) Terese CHANGE 1 SENSOR EVERY 10 DAYS 9 each 3    chlorthalidone (HYGROTEN) 25 MG Tab Take 1 tablet (25 mg total) by mouth once daily. 90 tablet 3    ciclopirox 1 % shampoo Wash scalp once weekly 120 mL 3    clobetasoL (TEMOVATE) 0.05 % external solution Apply daily to affected areas of scalp 50 mL 3    CONSTULOSE 10 gram/15 mL solution       DEXCOM G7  Misc Use as directed 1 each 0    dicyclomine (BENTYL) 10 MG capsule Take 1 capsule (10 mg total) by mouth 4 (four) times daily as needed (abdominal pain). 40 capsule 0    dulaglutide (TRULICITY) 4.5 mg/0.5 mL pen injector INJECT THE CONTENTS OF ONE PEN  SUBCUTANEOUSLY WEEKLY AS  DIRECTED 12 pen 3    DULoxetine (CYMBALTA) 30 MG capsule Take 2 capsules (60 mg total) by mouth once daily. 180 capsule 3    empagliflozin (JARDIANCE) 10 mg tablet Take 1 tablet (10 mg total) by mouth once daily. 100 tablet 3    glucagon (GVOKE HYPOPEN 1-PACK) 0.5 mg/0.1 mL AtIn Inject 1 Dose into the skin daily as needed (for severe hypoglycemia if you aren't able to treat by ingesting sugar). 1 Syringe 3    HYDROcodone-acetaminophen (NORCO) 5-325 mg per tablet Take 1 tablet by mouth every 6 (six) hours as needed for Pain. 12 tablet 0    hydrocortisone (ANUSOL-HC) 2.5 % rectal cream Place rectally 2 (two) times daily as needed for Hemorrhoids. 28 g 1    insulin lispro (HUMALOG KWIKPEN INSULIN) 100 unit/mL pen INJECT 13 UNITS INTO THE  SKIN 3 TIMES DAILY BEFORE  MEALS PLUS SLIDING SCALE -  MAX TOTAL DAILY DOSE 70  UNITS 30 mL 6    ketoconazole (NIZORAL) 2 % cream Apply topically once daily. 60 g 2    ketoconazole (NIZORAL) 2 % shampoo Wash hair with medicated shampoo at least 2x/week - let sit on scalp at least 5 minutes prior to rinsing 120 mL 5    labetaloL (NORMODYNE) 300 MG tablet Take 1 tablet (300 mg total) by mouth 2 (two) times daily. 180 tablet 3    lancets 31 gauge Misc 1 lancet by Misc.(Non-Drug; Combo Route) route 4 (four) times daily. E11.42 . Prescribed  one touch 200 each 6    mometasone (ELOCON) 0.1 % ointment Apply topically once daily. Apply twice daily 1 week on, 1 week off for 3-6 months 45 g 1    multivitamin (THERAGRAN) per tablet Take 1 tablet by mouth once daily.      mupirocin (BACTROBAN) 2 % ointment Apply topically 3 (three) times daily. 30 g 2    pregabalin (LYRICA) 150 MG capsule TAKE 1 CAPSULE BY MOUTH TWICE  DAILY 180 capsule 3    TRESIBA FLEXTOUCH U-100 100 unit/mL (3 mL) insulin pen Inject 45 Units into the skin every evening. 45 mL 3    sodium bicarbonate 325 MG tablet Take 2 tablets (650 mg total) by mouth 2 (two) times daily. 120 tablet 11     No current facility-administered medications for this visit.       Past Medical History:   Diagnosis Date    Allergy     Angio-edema     Asthma     Chronic obstructive pulmonary disease, unspecified COPD type 2022    Constipation 10/03/2019    Deep vein thrombophlebitis of left leg 2012    Deep vein thrombosis     when she had a knee replacement    Diabetic foot ulcer with osteomyelitis     Encounter for blood transfusion     anemic     GERD (gastroesophageal reflux disease)     Obesity, diabetes, and hypertension syndrome 2019    Obstructive sleep apnea 2012    PAD (peripheral artery disease) 2013    Pressure ulcer of toe of left foot     Type 2 diabetes mellitus with obesity 2020    Type 2 diabetes mellitus with peripheral artery disease 2020       Past Surgical History:   Procedure Laterality Date    CATARACT EXTRACTION W/  INTRAOCULAR LENS IMPLANT Left 3/2002    left     CATARACT EXTRACTION W/  INTRAOCULAR LENS IMPLANT Right     OD dr. olivares     SECTION      COLONOSCOPY N/A 2018    Procedure: COLONOSCOPY;  Surgeon: Faizan Mac MD;  Location: [facility] ([address] [place] [address]);  Service: Endoscopy;  Laterality: N/A;    COLONOSCOPY N/A 2023    Procedure: COLONOSCOPY;  Surgeon: Pa Zamora MD;  Location: [facility] ([address] [place] [address]);   Service: Endoscopy;  Laterality: N/A;    CYST REMOVAL  2/11/2011    left side of face    CYSTOSCOPY W/ RETROGRADES Left 2/26/2020    Procedure: CYSTOSCOPY, WITH RETROGRADE PYELOGRAM;  Surgeon: Noman Rivas MD;  [address];  Service: Urology;  Laterality: Left;  30min    DE QUERVAIN'S RELEASE  1/8/2021    Procedure: RELEASE, HAND, FOR DEQUERVAIN'S TENOSYNOVITIS;  Surgeon: Marky Hagen MD;  Location: [facility];  Service: Orthopedics;;    ESOPHAGOGASTRODUODENOSCOPY N/A 4/26/2023    Procedure: EGD (ESOPHAGOGASTRODUODENOSCOPY);  Surgeon: Pa Zamora MD;  Location: [facility] (2ND FLR);  Service: Endoscopy;  Laterality: N/A;  suprep-[facility] mail-bmi 49.09-tb  4/20/23- No answer for precall- [place]    EYE SURGERY Bilateral 2012    eye implants    GANGLION CYST EXCISION  11/13/2007    Right wrist    INJECTION OF ANESTHETIC AGENT AROUND MEDIAL BRANCH NERVES INNERVATING LUMBAR FACET JOINT Bilateral 4/5/2022    Procedure: Block-nerve-medial branch-lumbar bilateral L4-5 and L5-S1;  Surgeon: Alireza Meraz Jr., MD;  Location: [facility] PAIN T;  Service: Pain Management;  Laterality: Bilateral;  pt is diabetic   asa    INJECTION OF ANESTHETIC AGENT AROUND MEDIAL BRANCH NERVES INNERVATING LUMBAR FACET JOINT Bilateral 4/19/2022    Procedure: Block-nerve-medial branch-lumbar bilaterl L4-5 and L5-S1;  Surgeon: Alireza Meraz Jr., MD;  Location: [facility] PAIN MGT;  Service: Pain Management;  Laterality: Bilateral;  pt is diabetic     INJECTION OF FACET JOINT Bilateral 5/6/2021    Procedure: INJECTION, FACET JOINT--Bilateral L4-5, L5-S1;  Surgeon: Alireza Meraz Jr., MD;  Location: [facility] PAIN MGT;  Service: Pain Management;  Laterality: Bilateral;  Oral    INTERPOSITION ARTHROPLASTY OF CARPOMETACARPAL JOINTS Left 1/8/2021    Procedure: INTERPOSITION ARTHROPLASTY, CMC JOINT - left dequervains and cmc arthroplasty, arthrex;  Surgeon: Marky Hagen MD;  Location: [facility];  Service: Orthopedics;  Laterality: Left;     JOINT REPLACEMENT Left     Pan Retinal Photocoagulation Bilateral     Dr. Monterroso (Proliferative Diabetic Retinopathy)    RADIOFREQUENCY ABLATION OF LUMBAR MEDIAL BRANCH NERVE AT SINGLE LEVEL Bilateral 12/20/2023    Procedure: Radiofrequency Ablation, Nerve, Spinal, Lumbar, bilateral L4/5 L5/S1;  Surgeon: Alejandrina Roa DO;  Location: [facility] PAIN MANAGEMENT;  Service: Pain Management;  Laterality: Bilateral;  diabetic  asa    RADIOFREQUENCY THERMOCOAGULATION Right 5/12/2022    Procedure: RADIOFREQUENCY THERMAL COAGULATION RIGHT L4-5, L5-S1 (IV Sedation);  Surgeon: Alireza Meraz Jr., MD;  Location: [facility] PAIN MGT;  Service: Pain Management;  Laterality: Right;  diabetic    RADIOFREQUENCY THERMOCOAGULATION Left 5/19/2022    Procedure: RADIOFREQUENCY THERMAL COAGULATION LEFT L4-5, L5-S1 (IV Sedation);  Surgeon: Alireza Meraz Jr., MD;  Location: [facility] PAIN MGT;  Service: Pain Management;  Laterality: Left;  diabetic    TOTAL ABDOMINAL HYSTERECTOMY  1982    TOTAL KNEE ARTHROPLASTY  2/2006    left    TRIGGER FINGER RELEASE  9/18/2009       Vital Signs (Most Recent)  There were no vitals filed for this visit.        Review of Systems:  ROS:  Constitutional: no fever or chills, Morbid obesity BMI 46.04  Eyes: no visual changes, Diabetic retinopathy  ENT: no nasal congestion or sore throat  Respiratory: no cough or shortness of breath, Positive history of asthma  Cardiovascular: no chest pain or palpitations, Positive P 80, CHF, diastolic dysfunction  Gastrointestinal: no nausea or vomiting, tolerating diet, Positive for GERD  Genitourinary: no hematuria or dysuria, Positive CKD stage 4  Integument/Breast: no rash or pruritis  Hematologic/Lymphatic: no easy bruising or lymphadenopathy, Positive for iron deficiency anemia, clotting disorder, long-term use of anticoagulation, history deep vein thrombophlebitis of the left leg  Musculoskeletal: Positive for let induced gout, lumbar facet atrophy, chronic low back pain,  "osteoarthritis of multiple lower extremities  Neurological: Positive for polyneuropathy secondary to diabetes, degenerative disc disease, history of stroke, lumbar spinal stenosis, cervical myopathy,  Behavioral/Psych: no auditory or visual hallucinations  Endocrine: no heat or cold intolerance, Positive diabetes type 2 poorly controlled with polyneuropathy,  stage 4 kidney disease vitamin-D deficiency hyperglycemi                OBJECTIVE:     PHYSICAL EXAM:  Height: 5' 5" (165.1 cm) Weight: 125.5 kg (276 lb 10.8 oz), General Appearance: Well nourished, well developed, in no acute distress.  Neurological: Mood & affect are normal.    right Knee Exam:  Knee Range of Motion:0-85 degrees flexion   Effusion:none  Condition of skin:intact  Location of tenderness:  Medial and lateral joint line   Strength:limited by pain and 5 of 5  Stability:  Lachman: stable, LCL: stable, MCL: stable, PCL: stable, and posteromedial (dial): stable  Varus /Valgus stress:  moderate  Hans:   negative/negative    RADIOGRAPHS:  X-rays of the knees from previous visit reviewed by me today demonstrate severe arthritic changes throughout the knee with marked osteophytic spurring tricompartmentally almost complete loss of medial joint space less than 2 or 3 mm of lateral joint space    ASSESSMENT/PLAN:       ICD-10-CM ICD-9-CM   1. Primary osteoarthritis of right knee  M17.11 715.16   2. Morbid obesity  E66.01 278.01       Plan: We discussed with the patient at length all the different treatment options available for  the knee including anti-inflammatories, acetaminophen, rest, ice, knee strengthening exercise, occasional cortisone injections for temporary relief, Viscosupplimentation injections, arthroscopic debridement osteotomy, and finally knee arthroplasty.   Will proceed with therapeutic diagnostic cortisone injection of the right knee     The risks, benefits, pros, cons, and potential side effects of the procedure were discussed with " the patient in detail all questions were answered.  The patient is comfortable and willing to proceed with the procedure. Verbal consent was obtained and the proper joint was identified by the patient and provider        The injection site was identified and the skin was prepared with a betadine solution. The   right  knee was injected with 1 ml of Celestone and 5 ml Lidocaine under sterile technique. Annika Mac tolerated the procedure well, she was advised to rest the knee today, ice and elevation. she did receive immediate relief of the pain in and about his knee she was told this would be short lived and is secondary to the lidocaine. she may have an increase in his discomfort tonight followed by steady improvement over the next several days. I may take 1-3 weeks following the injection to get the full benefit of the medication.  I will see her back in 3 6 months. Sooner if he has any problems or concerns.    Annika Mac was advised to monitor her blood sugars closely over the next several days. The steroid may cause a rise in them. If her glucose levels rise to a point she is uncomfortable or he is unable to control them is is to contact his PCP or go immediately to the emergency department.

## 2024-11-06 ENCOUNTER — TELEPHONE (OUTPATIENT)
Dept: INTERNAL MEDICINE | Facility: CLINIC | Age: 74
End: 2024-11-06
Payer: MEDICARE

## 2024-11-06 NOTE — TELEPHONE ENCOUNTER
----- Message from Yaron sent at 11/6/2024  8:34 AM CST -----  Contact: Pt  [phone]  Patient is returning a phone call.    Who left a message for the patient: Mamie Cotton    Does patient know what this is regarding:  Yes    Would you like a call back, or a response through your [facility] portal?:   Callback     Comments:

## 2024-11-06 NOTE — TELEPHONE ENCOUNTER
----- Message from Yenny sent at 11/6/2024 10:08 AM CST -----  Contact: [phone] Patient  Patient is returning a phone call.    Who left a message for the patient: Mamie Cotton MD    Does patient know what this is regarding:      Would you like a call back, or a response through your [facility] portal?:   Call Back Please. Thank you    Comments: Pt is leaving at 10:45 AM asking if the doctor can call before that time.

## 2024-11-13 ENCOUNTER — DOCUMENTATION ONLY (OUTPATIENT)
Dept: HEMATOLOGY/ONCOLOGY | Facility: CLINIC | Age: 74
End: 2024-11-13
Payer: MEDICARE

## 2024-11-13 NOTE — PROGRESS NOTES
Received referral from the clinic in regards to transportation to appointments and injections. Says her  since his transplant has been more tired and weak to bring her to [place]. Her son has a hard time finding someone to cover for him at work so that he can take her. She said her insurance she believes offers transportation but [place] is too far. She feels that possible if she could get something set up closer to her home. She will call her insurance to verify that she has the benefit and told her I would reach out to the clinic to see if she could be set up closer to home.

## 2024-11-15 ENCOUNTER — OFFICE VISIT (OUTPATIENT)
Dept: DERMATOLOGY | Facility: CLINIC | Age: 74
End: 2024-11-15
Payer: MEDICARE

## 2024-11-15 ENCOUNTER — INFUSION (OUTPATIENT)
Dept: INFUSION THERAPY | Facility: HOSPITAL | Age: 74
End: 2024-11-15
Payer: MEDICARE

## 2024-11-15 VITALS — SYSTOLIC BLOOD PRESSURE: 112 MMHG | DIASTOLIC BLOOD PRESSURE: 53 MMHG | RESPIRATION RATE: 18 BRPM | HEART RATE: 72 BPM

## 2024-11-15 DIAGNOSIS — L66.9 CICATRICIAL ALOPECIA: ICD-10-CM

## 2024-11-15 DIAGNOSIS — D63.1 ANEMIA OF RENAL DISEASE: Primary | ICD-10-CM

## 2024-11-15 DIAGNOSIS — L66.81 CENTRAL CENTRIFUGAL CICATRICIAL ALOPECIA: Primary | ICD-10-CM

## 2024-11-15 DIAGNOSIS — N18.9 ANEMIA OF RENAL DISEASE: Primary | ICD-10-CM

## 2024-11-15 PROCEDURE — 96372 THER/PROPH/DIAG INJ SC/IM: CPT

## 2024-11-15 PROCEDURE — 63600175 PHARM REV CODE 636 W HCPCS: Mod: JZ,EC,JG | Performed by: INTERNAL MEDICINE

## 2024-11-15 PROCEDURE — 99999 PR PBB SHADOW E&M-EST. PATIENT-LVL IV: CPT | Mod: PBBFAC,,, | Performed by: STUDENT IN AN ORGANIZED HEALTH CARE EDUCATION/TRAINING PROGRAM

## 2024-11-15 RX ORDER — DOXYCYCLINE HYCLATE 20 MG
TABLET ORAL
Qty: 60 TABLET | Refills: 6 | Status: SHIPPED | OUTPATIENT
Start: 2024-11-15

## 2024-11-15 RX ADMIN — EPOETIN ALFA-EPBX 40000 UNITS: 40000 INJECTION, SOLUTION INTRAVENOUS; SUBCUTANEOUS at 12:11

## 2024-11-15 NOTE — PROGRESS NOTES
Subjective:      Patient ID:  Annika Mac is a 73 y.o. female who presents for No chief complaint on file.    LV 8-2024 with me    Interval 11/15/2024:   Pt presents today for hair loss follow up. Reports condition has worsened. Declined use of Minoxidil 5% or VD daily. Using clobetasol solution to scalp daily and using ciclopirox shampoo weekly. Reports a little scalp itching improved from prior, 5/10.     Initial hx with me: 8/14/2024   1. Hair loss  Former pt of Dr. Curran, last een 4-  Duration: >3 years  Itching (scale 1-10): 7/10  Current tx: ketoconazole shampoo  Prior tx: Rogaine, finasteride, ketoconazole shampoo, ILK, fluocinolone oil  PMHx CKD, T2DM, HTN  Prior hx tight ebony  Loss at vertex and frontal scalp           Review of Systems    Objective:   Physical Exam     Diagram Legend     Erythematous scaling macule/papule c/w actinic keratosis       Vascular papule c/w angioma      Pigmented verrucoid papule/plaque c/w seborrheic keratosis      Yellow umbilicated papule c/w sebaceous hyperplasia      Irregularly shaped tan macule c/w lentigo     1-2 mm smooth white papules consistent with Milia      Movable subcutaneous cyst with punctum c/w epidermal inclusion cyst      Subcutaneous movable cyst c/w pilar cyst      Firm pink to brown papule c/w dermatofibroma      Pedunculated fleshy papule(s) c/w skin tag(s)      Evenly pigmented macule c/w junctional nevus     Mildly variegated pigmented, slightly irregular-bordered macule c/w mildly atypical nevus      Flesh colored to evenly pigmented papule c/w intradermal nevus       Pink pearly papule/plaque c/w basal cell carcinoma      Erythematous hyperkeratotic cursted plaque c/w SCC      Surgical scar with no sign of skin cancer recurrence      Open and closed comedones      Inflammatory papules and pustules      Verrucoid papule consistent consistent with wart     Erythematous eczematous patches and plaques     Dystrophic onycholytic nail with  subungual debris c/w onychomycosis     Umbilicated papule    Erythematous-base heme-crusted tan verrucoid plaque consistent with inflamed seborrheic keratosis     Erythematous Silvery Scaling Plaque c/w Psoriasis     See annotation              Assessment / Plan:      Central centrifugal cicatricial alopecia (CCCA)  Status: chronic condition flaring and/or not at treatment goal  Hair loss prominent at vertex scalp with some evidence of shiny scarred areas/loss of follicular ostia  Also frontal thinning/loss with fringe sign consistent with component of traction alopecia  Scale present: no  Pustules/bogginess present: no  Pruritus present: 7/10     Discussed this is a chronic condition that typically progresses with time and may leave behind scarring- areas where hair more difficult to regrow- so early treatments important. Etiology likely multifactorial, but we think there may be genetic predisposition. Reviewed primary goal of treatment is to stop progresssion/stabilize and treat any symptoms. Secondary goal is regrowth of lost hair, but counseled that regrowth takes time/consistent treatment at least 6-12 months to see results of treatment and sometimes can be difficult in scarred areas.  Encourage natural hairstyles free of chemicals, heat and avoiding glue for wigs     Plan:  Serial ILK offered again today 6-12 wks- pt declined  Continue clobetasol solution to scalp daily  Continue ciclopirox shampoo to scalp once weekly  Start Vitamin D 2000 IU daily (over the counter)  Start minoxidil 5% solution or foam daily (men's strength) cheapest in [place] or [employer] or [employer]  START doxycycline 20 mg BID (low dose, sub-antimicrobial can be beneficial in CCCA but need to be continued consistently)    RTC 3-6 mo, sooner prn

## 2024-12-05 ENCOUNTER — TELEPHONE (OUTPATIENT)
Dept: HEMATOLOGY/ONCOLOGY | Facility: CLINIC | Age: 74
End: 2024-12-05
Payer: MEDICARE

## 2024-12-05 NOTE — TELEPHONE ENCOUNTER
----- Message from Laquita sent at 12/5/2024  8:52 AM CST -----  Regarding: Apt  Contact: Pt  [phone]            Current Appt date: 12/05/24     Type of Appt:  Lab, and est pt b    Physician: Ivan Rodriguez MD    Reason for rescheduling: Pt  has been admitted      Caller: Annika      Contact Preference:[phone]  or  [phone]

## 2024-12-12 ENCOUNTER — OFFICE VISIT (OUTPATIENT)
Dept: SLEEP MEDICINE | Facility: CLINIC | Age: 74
End: 2024-12-12
Payer: MEDICARE

## 2024-12-12 DIAGNOSIS — E11.42 TYPE 2 DIABETES MELLITUS WITH DIABETIC POLYNEUROPATHY, WITH LONG-TERM CURRENT USE OF INSULIN: ICD-10-CM

## 2024-12-12 DIAGNOSIS — E66.01 MORBID OBESITY: ICD-10-CM

## 2024-12-12 DIAGNOSIS — G47.33 OSA (OBSTRUCTIVE SLEEP APNEA): Primary | ICD-10-CM

## 2024-12-12 DIAGNOSIS — Z79.4 TYPE 2 DIABETES MELLITUS WITH DIABETIC POLYNEUROPATHY, WITH LONG-TERM CURRENT USE OF INSULIN: ICD-10-CM

## 2024-12-12 NOTE — ASSESSMENT & PLAN NOTE
12/12/2024   EPWORTH SLEEPINESS SCALE   Sitting and reading 1   Watching TV 1   Sitting, inactive in a public place (e.g. a theatre or a meeting) 0   As a passenger in a car for an hour without a break 1   Lying down to rest in the afternoon when circumstances permit 1   Sitting and talking to someone 0   Sitting quietly after a lunch without alcohol 1   In a car, while stopped for a few minutes in traffic 0   Total score 5        Data 09/13/2024 to 12/11/2024  Usage > 4 hrs was 73%  APAP 5-10    USING AND BENEFITS

## 2024-12-12 NOTE — PROGRESS NOTES
The patient location is: [facility]   The chief complaint leading to consultation is:     Chief Complaint   Patient presents with    Sleep Apnea    Asthma        Visit type: audiovisual    Face to Face time with patient: 8 mins  30 minutes of total time spent on the encounter, which includes face to face time and non-face to face time preparing to see the patient (eg, review of tests), Obtaining and/or reviewing separately obtained history, Documenting clinical information in the electronic or other health record, Independently interpreting results (not separately reported) and communicating results to the patient/family/caregiver, or Care coordination (not separately reported).         Each patient to whom he or she provides medical services by telemedicine is:  (1) informed of the relationship between the physician and patient and the respective role of any other health care provider with respect to management of the patient; and (2) notified that he or she may decline to receive medical services by telemedicine and may withdraw from such care at any time.    Notes:                                           Pulmonary Outpatient  Visit     Subjective:       Patient ID: Annika Mac is a 74 y.o. female.    Social History     Tobacco Use   Smoking Status Never    Passive exposure: Past   Smokeless Tobacco Never            Chief Complaint: Sleep Apnea and Asthma      Annika Mac is 72 y.o.  Referred by Avril Bianchi, FNP  [address] [place] [address]   Seen Ms Ferraro last year  Per PCP note April 2023: not using consistently  CPAP take away  PSG reviewed: Moderate to severe RUFINA,   CPAP returned last year  Snoring, apnea, DTS  Comorbidity: BMI, DM, Hx of stroke, HTN  Also carried Dx Asthma: PRN inhaler  No prior spirometry  CXR was reviewed  Never smoker  FeNo was 7       Split night study 11/2013 showed AHI 23.7 that was reduced to 3.3 at 8 cm H20.   She was on CPAP for about a year.   She  "felt like her air pressure was not set correctly.   PSG 2/2021 AHI 12.3  PSG 9/2021 AHI 6.3 supine    07/27/2023  Here with daughter:  PSG: AHI 4.4/hr  Suspicion high will repeat HSAT  Spiroemtry was normal    CONCLUSION:  1. Overall AHI was 4.4/hr with 31 events. REM AHI was 9.3/hr  2. SpO2 alfa was 88.0%  3. Sleep efficiency was 92.3%  4. REM latency was 65 minutes  5. Moderate Snoring     DIAGNOSIS: Upper airway resistance syndrome,      RECOMMENDATIONS:     1. Weight loss  2. ENT evaluation for Snoring  3. Positional therapy for Snoring or dental mandibular device fashioned by dental provider    09/21/2023   Follow-up appointment   Reviewed sleep study results   Positive for mild obstructive sleep apnea   Goals for CPAP discussed   CPAP will be ordered      12//07/2023  Doing well with CPAP  Recent echo and stress        12/12/2024  Followup  Doing well  Bed time: 10 pm, Short sleep latency  Wake time 8-9 am  APAp 5-10 cm  Usage > 4 hrs: 73%  Mask: Nasal  Recent ER visit for knee pain, arthritis  Recent iron infusion              Review of Systems   Respiratory:  Negative for apnea, snoring and somnolence.    All other systems reviewed and are negative.      Outpatient Encounter Medications as of 12/12/2024   Medication Sig Dispense Refill    albuterol (PROAIR HFA) 90 mcg/actuation inhaler Inhale 2 puffs into the lungs every 6 (six) hours as needed for Wheezing. Rescue 18 g 6    allopurinoL (ZYLOPRIM) 300 MG tablet       amLODIPine (NORVASC) 10 MG tablet Take 1 tablet (10 mg total) by mouth once daily. 90 tablet 2    aspirin 81 MG Chew Take 1 tablet (81 mg total) by mouth once daily.  0    BD ULTRA-FINE KIKA PEN NEEDLE 32 gauge x 5/32" Ndle USE 4 TIMES DAILY 200 each 20    blood sugar diagnostic Strp 1 strip 4 times daily. Contour Next. 200 strip 11    blood-glucose meter kit 1 each by Other route once daily. Use daily. Insurance proffered one touch  E11.42 1 each 0    blood-glucose sensor (DEXCOM G7 SENSOR " MISC) by Misc.(Non-Drug; Combo Route) route.      blood-glucose sensor (DEXCOM G7 SENSOR) Terese CHANGE 1 SENSOR EVERY 10 DAYS 9 each 3    chlorthalidone (HYGROTEN) 25 MG Tab Take 1 tablet (25 mg total) by mouth once daily. 90 tablet 3    ciclopirox 1 % shampoo Wash scalp once weekly 120 mL 3    clobetasoL (TEMOVATE) 0.05 % external solution Apply daily to affected areas of scalp 50 mL 3    CONSTULOSE 10 gram/15 mL solution       DEXCOM G7  Misc Use as directed 1 each 0    dicyclomine (BENTYL) 10 MG capsule Take 1 capsule (10 mg total) by mouth 4 (four) times daily as needed (abdominal pain). 40 capsule 0    doxycycline (PERIOSTAT) 20 MG tablet Take 1 pill twice daily morning and night 60 tablet 6    dulaglutide (TRULICITY) 4.5 mg/0.5 mL pen injector INJECT THE CONTENTS OF ONE PEN  SUBCUTANEOUSLY WEEKLY AS  DIRECTED 12 pen 3    DULoxetine (CYMBALTA) 30 MG capsule Take 2 capsules (60 mg total) by mouth once daily. 180 capsule 3    empagliflozin (JARDIANCE) 10 mg tablet Take 1 tablet (10 mg total) by mouth once daily. 100 tablet 3    glucagon (GVOKE HYPOPEN 1-PACK) 0.5 mg/0.1 mL AtIn Inject 1 Dose into the skin daily as needed (for severe hypoglycemia if you aren't able to treat by ingesting sugar). 1 Syringe 3    hydrocortisone (ANUSOL-HC) 2.5 % rectal cream Place rectally 2 (two) times daily as needed for Hemorrhoids. 28 g 1    insulin lispro (HUMALOG KWIKPEN INSULIN) 100 unit/mL pen INJECT 13 UNITS INTO THE  SKIN 3 TIMES DAILY BEFORE  MEALS PLUS SLIDING SCALE -  MAX TOTAL DAILY DOSE 70  UNITS 30 mL 6    ketoconazole (NIZORAL) 2 % cream Apply topically once daily. 60 g 2    ketoconazole (NIZORAL) 2 % shampoo Wash hair with medicated shampoo at least 2x/week - let sit on scalp at least 5 minutes prior to rinsing 120 mL 5    labetaloL (NORMODYNE) 300 MG tablet Take 1 tablet (300 mg total) by mouth 2 (two) times daily. 180 tablet 3    lancets 31 gauge Misc 1 lancet by Misc.(Non-Drug; Combo Route) route 4 (four)  times daily. E11.42 . Prescribed one touch 200 each 6    mometasone (ELOCON) 0.1 % ointment Apply topically once daily. Apply twice daily 1 week on, 1 week off for 3-6 months 45 g 1    multivitamin (THERAGRAN) per tablet Take 1 tablet by mouth once daily.      mupirocin (BACTROBAN) 2 % ointment Apply topically 3 (three) times daily. 30 g 2    pregabalin (LYRICA) 150 MG capsule TAKE 1 CAPSULE BY MOUTH TWICE  DAILY 180 capsule 3    TRESIBA FLEXTOUCH U-100 100 unit/mL (3 mL) insulin pen Inject 45 Units into the skin every evening. 45 mL 3    atorvastatin (LIPITOR) 80 MG tablet Take 1 tablet (80 mg total) by mouth every evening. 30 tablet 11    sodium bicarbonate 325 MG tablet Take 2 tablets (650 mg total) by mouth 2 (two) times daily. 120 tablet 11    [DISCONTINUED] insulin glargine, TOUJEO, (TOUJEO SOLOSTAR) 300 unit/mL (1.5 mL) InPn pen INJECT 40 UNITS INTO THE SKIN EVERY EVENING TITRATE UP AS DIRECTED. 4.5 mL 0     No facility-administered encounter medications on file as of 12/12/2024.       The following portions of the patient's history were reviewed and updated as appropriate: She  has a past medical history of Allergy, Angio-edema, Asthma, Chronic obstructive pulmonary disease, unspecified COPD type (01/14/2022), Constipation (10/03/2019), Deep vein thrombophlebitis of left leg (07/27/2012), Deep vein thrombosis, Diabetic foot ulcer with osteomyelitis, Encounter for blood transfusion, GERD (gastroesophageal reflux disease), Obesity, diabetes, and hypertension syndrome (02/13/2019), Obstructive sleep apnea (07/27/2012), PAD (peripheral artery disease) (11/21/2013), Pressure ulcer of toe of left foot, Type 2 diabetes mellitus with obesity (08/19/2020), and Type 2 diabetes mellitus with peripheral artery disease (08/19/2020).  She does not have any pertinent problems on file.  She  has a past surgical history that includes Total knee arthroplasty (2/2006); Total abdominal hysterectomy (1982); Ganglion cyst  excision (2007); Cyst Removal (2011); Trigger finger release (2009);  section; Cataract extraction w/  intraocular lens implant (Left, 3/2002); Cataract extraction w/  intraocular lens implant (Right, ); Pan Retinal Photocoagulation (Bilateral); Eye surgery (Bilateral, ); Colonoscopy (N/A, 2018); Cystoscopy w/ retrogrades (Left, 2020); Joint replacement (Left); Interposition arthroplasty of carpometacarpal joints (Left, 2021); De Quervain's release (2021); Injection of facet joint (Bilateral, 2021); Injection of anesthetic agent around medial branch nerves innervating lumbar facet joint (Bilateral, 2022); Injection of anesthetic agent around medial branch nerves innervating lumbar facet joint (Bilateral, 2022); Radiofrequency thermocoagulation (Right, 2022); Radiofrequency thermocoagulation (Left, 2022); Esophagogastroduodenoscopy (N/A, 2023); Colonoscopy (N/A, 2023); and Radiofrequency ablation of lumbar medial branch nerve at single level (Bilateral, 2023).  Her family history includes Diabetes in her brother, mother, and sister; Heart disease in her father; Hypertension in her brother and mother; No Known Problems in her brother, daughter, daughter, sister, and son; Thyroid disease in her brother.  She  reports that she has never smoked. She has been exposed to tobacco smoke. She has never used smokeless tobacco. She reports that she does not drink alcohol and does not use drugs.  She has a current medication list which includes the following prescription(s): albuterol, allopurinol, amlodipine, aspirin, bd ultra-fine myesha pen needle, blood sugar diagnostic, blood-glucose meter, blood-glucose sensor, dexcom g7 sensor, chlorthalidone, ciclopirox, clobetasol, constulose, dexcom g7 , dicyclomine, doxycycline, trulicity, duloxetine, empagliflozin, gvoke hypopen 1-pack, hydrocortisone, insulin lispro, ketoconazole,  "ketoconazole, labetalol, lancets, mometasone, multivitamin, mupirocin, pregabalin, tresiba flextouch u-100, atorvastatin, sodium bicarbonate, and [DISCONTINUED] insulin glargine (toujeo).  Current Outpatient Medications on File Prior to Visit   Medication Sig Dispense Refill    albuterol (PROAIR HFA) 90 mcg/actuation inhaler Inhale 2 puffs into the lungs every 6 (six) hours as needed for Wheezing. Rescue 18 g 6    allopurinoL (ZYLOPRIM) 300 MG tablet       amLODIPine (NORVASC) 10 MG tablet Take 1 tablet (10 mg total) by mouth once daily. 90 tablet 2    aspirin 81 MG Chew Take 1 tablet (81 mg total) by mouth once daily.  0    BD ULTRA-FINE KIKA PEN NEEDLE 32 gauge x 5/32" Ndle USE 4 TIMES DAILY 200 each 20    blood sugar diagnostic Strp 1 strip 4 times daily. Contour Next. 200 strip 11    blood-glucose meter kit 1 each by Other route once daily. Use daily. Insurance proffered one touch  E11.42 1 each 0    blood-glucose sensor (DEXCOM G7 SENSOR MISC) by Misc.(Non-Drug; Combo Route) route.      blood-glucose sensor (DEXCOM G7 SENSOR) Terese CHANGE 1 SENSOR EVERY 10 DAYS 9 each 3    chlorthalidone (HYGROTEN) 25 MG Tab Take 1 tablet (25 mg total) by mouth once daily. 90 tablet 3    ciclopirox 1 % shampoo Wash scalp once weekly 120 mL 3    clobetasoL (TEMOVATE) 0.05 % external solution Apply daily to affected areas of scalp 50 mL 3    CONSTULOSE 10 gram/15 mL solution       DEXCOM G7  Misc Use as directed 1 each 0    dicyclomine (BENTYL) 10 MG capsule Take 1 capsule (10 mg total) by mouth 4 (four) times daily as needed (abdominal pain). 40 capsule 0    doxycycline (PERIOSTAT) 20 MG tablet Take 1 pill twice daily morning and night 60 tablet 6    dulaglutide (TRULICITY) 4.5 mg/0.5 mL pen injector INJECT THE CONTENTS OF ONE PEN  SUBCUTANEOUSLY WEEKLY AS  DIRECTED 12 pen 3    DULoxetine (CYMBALTA) 30 MG capsule Take 2 capsules (60 mg total) by mouth once daily. 180 capsule 3    empagliflozin (JARDIANCE) 10 mg tablet " Take 1 tablet (10 mg total) by mouth once daily. 100 tablet 3    glucagon (GVOKE HYPOPEN 1-PACK) 0.5 mg/0.1 mL AtIn Inject 1 Dose into the skin daily as needed (for severe hypoglycemia if you aren't able to treat by ingesting sugar). 1 Syringe 3    hydrocortisone (ANUSOL-HC) 2.5 % rectal cream Place rectally 2 (two) times daily as needed for Hemorrhoids. 28 g 1    insulin lispro (HUMALOG KWIKPEN INSULIN) 100 unit/mL pen INJECT 13 UNITS INTO THE  SKIN 3 TIMES DAILY BEFORE  MEALS PLUS SLIDING SCALE -  MAX TOTAL DAILY DOSE 70  UNITS 30 mL 6    ketoconazole (NIZORAL) 2 % cream Apply topically once daily. 60 g 2    ketoconazole (NIZORAL) 2 % shampoo Wash hair with medicated shampoo at least 2x/week - let sit on scalp at least 5 minutes prior to rinsing 120 mL 5    labetaloL (NORMODYNE) 300 MG tablet Take 1 tablet (300 mg total) by mouth 2 (two) times daily. 180 tablet 3    lancets 31 gauge Misc 1 lancet by Misc.(Non-Drug; Combo Route) route 4 (four) times daily. E11.42 . Prescribed one touch 200 each 6    mometasone (ELOCON) 0.1 % ointment Apply topically once daily. Apply twice daily 1 week on, 1 week off for 3-6 months 45 g 1    multivitamin (THERAGRAN) per tablet Take 1 tablet by mouth once daily.      mupirocin (BACTROBAN) 2 % ointment Apply topically 3 (three) times daily. 30 g 2    pregabalin (LYRICA) 150 MG capsule TAKE 1 CAPSULE BY MOUTH TWICE  DAILY 180 capsule 3    TRESIBA FLEXTOUCH U-100 100 unit/mL (3 mL) insulin pen Inject 45 Units into the skin every evening. 45 mL 3    atorvastatin (LIPITOR) 80 MG tablet Take 1 tablet (80 mg total) by mouth every evening. 30 tablet 11    sodium bicarbonate 325 MG tablet Take 2 tablets (650 mg total) by mouth 2 (two) times daily. 120 tablet 11    [DISCONTINUED] insulin glargine, TOUJEO, (TOUJEO SOLOSTAR) 300 unit/mL (1.5 mL) InPn pen INJECT 40 UNITS INTO THE SKIN EVERY EVENING TITRATE UP AS DIRECTED. 4.5 mL 0     No current facility-administered medications on file prior to  "visit.     She is allergic to clindamycin and iodinated contrast media..      BP Readings from Last 3 Encounters:   11/22/24 138/64   11/15/24 (!) 112/53   11/04/24 131/65     Snoring / Sleep:          12/12/2024   EPWORTH SLEEPINESS SCALE   Sitting and reading 1   Watching TV 1   Sitting, inactive in a public place (e.g. a theatre or a meeting) 0   As a passenger in a car for an hour without a break 1   Lying down to rest in the afternoon when circumstances permit 1   Sitting and talking to someone 0   Sitting quietly after a lunch without alcohol 1   In a car, while stopped for a few minutes in traffic 0   Total score 5        Neck circumference 16"        MMRC Dyspnea Scale (4 is worst)     [] MMRC 0: Dyspneic on strenuous excercise (0 points)    [x] MMRC 1: Dyspneic on walking a slight hill (0 points)    [] MMRC 2: Dyspneic on walking level ground; must stop occasionally due to breathlessness (1 point)    [] MMRC 3: Must stop for breathlessness after walking 100 yards or after a few minutes (2 points)    [] MMRC 4: Cannot leave house; breathless on dressing/undressing (3 points)       Asthma Control Test  In the past 4  weeks, how much of the time did your asthma keep you from getting as much done at work, school or at home?: None of the time  During the past 4 weeks, how often have you had shortness of breath?: Not at all  During the past 4 weeks, how often did your asthma symptoms (wheezing, couging, shortness of breath, chest tightness or pain) wake you up at night or earlier than usual in the morning?: Not at all  During the past 4 weeks, how often have you used your rescue inhaler or nebulizer medication (such as albuterol)?: Not at all  How would you rate your asthma control during the past 4 weeks?: Somewhat controlled  If your score is 19 or less, your asthma may not be under control: 23             No flowsheet data found.              Objective:     Vital Signs (Most Recent)   ]  Wt Readings from Last " "2 Encounters:   24 125.5 kg (276 lb 10.8 oz)   24 127.5 kg (281 lb)       Physical Exam  Vitals and nursing note reviewed.   Eyes:      Pupils: Pupils are equal, round, and reactive to light.   Neurological:      General: No focal deficit present.          Laboratory  Lab Results   Component Value Date    WBC 6.09 2024    RBC 3.58 (L) 2024    HGB 9.9 (L) 2024    HCT 32.0 (L) 2024    MCV 89 2024    MCH 27.7 2024    MCHC 30.9 (L) 2024    RDW 14.6 (H) 2024     2024    MPV 10.8 2024    GRAN 3.3 2024    GRAN 53.5 2024    LYMPH 1.7 2024    LYMPH 27.8 2024    MONO 0.7 2024    MONO 10.7 2024    EOS 0.4 2024    BASO 0.06 2024    EOSINOPHIL 6.7 2024    BASOPHIL 1.0 2024       BMP  Lab Results   Component Value Date     2024    K 4.0 2024     2024    CO2 26 2024    BUN 32 (H) 2024    CREATININE 1.9 (H) 2024    CALCIUM 9.4 2024    ANIONGAP 6 (L) 2024    ESTGFRAFRICA 29.9 (A) 2022    EGFRNONAA 26.0 (A) 2022    AST 22 2024    ALT 13 2024    PROT 6.0 2024          No results found for: "IGE"     No results found for: "ASPERGILLUS"  No results found for: "AFUMIGATUSCL"     No results found for: "ACE"     Diagnostic Results:  I have personally reviewed today the following studies:  Annika Mas  2024 - 2024  Patient ID: [id number]  : 1950  Age: 74 years  [facility]  [address] [facility], [id number]  Compliance Report  Compliance  Payor Standard  Usage 2024 - 2024  Usage days 69/90 days (77%)  >= 4 hours 66 days (73%)  < 4 hours 3 days (3%)  Usage hours 411 hours 39 minutes  Average usage (total days) 4 hours 34 minutes  Average usage (days used) 5 hours 58 minutes  Median usage (days used) 6 hours 2 minutes  Total used hours (value since last " reset - 12/11/2024) 2,262 hours  AirSense 11 AutoSet  Serial number [id number]  Mode AutoSet  Min Pressure 5 cmH2O  Max Pressure 10 cmH2O  EPR Fulltime  EPR level 3  Response Standard  Therapy  Pressure - cmH2O Median: 6.6 95th percentile: 8.3 Maximum: 9.1  Leaks - L/min Median: 0.2 95th percentile: 2.7 Maximum: 73.7  Events per hour AI: 1.1 HI: 0.2 AHI: 1.3  Apnea Index Central: 0.1 Obstructive: 0.9 Unknown: 0.1  RERA Index 0.0  Cheyne-Sosa respiration (average duration per night) 0 minutes (0%)       Assessment/Plan:     Problem List Items Addressed This Visit       Morbid obesity    Type 2 diabetes mellitus with diabetic polyneuropathy, with long-term current use of insulin    RUFINA (obstructive sleep apnea) - Primary         12/12/2024   EPWORTH SLEEPINESS SCALE   Sitting and reading 1   Watching TV 1   Sitting, inactive in a public place (e.g. a theatre or a meeting) 0   As a passenger in a car for an hour without a break 1   Lying down to rest in the afternoon when circumstances permit 1   Sitting and talking to someone 0   Sitting quietly after a lunch without alcohol 1   In a car, while stopped for a few minutes in traffic 0   Total score 5        Data 09/13/2024 to 12/11/2024  Usage > 4 hrs was 73%  APAP 5-10    USING AND BENEFITS         Relevant Orders    CPAP/BIPAP SUPPLIES        Discussed therapeutic goals for positive airway pressure therapy(CPAP or BiPAP):   Ideal is usage 100% of nights for 6 - 8 hours per night.   Minimum usage is 70% of night for at least 4 hours per night used.  Mask choices discussed.  Patient expressed understanding. All Questions answered.      Follow up in about 1 year (around 12/12/2025), or download, weight loss, , new supplies.    This note was prepared using voice recognition system and is likely to have sound alike errors that may have been overlooked even after proof reading.  Please call me with any questions    Discussed diagnosis, its evaluation, treatment and usual  course. All questions answered.    Thank you for the courtesy of participating in the care of this patient    Trent Leon MD      Personal Diagnostic Review  [x]  CXR    [x]  Sleep study    [x]  echo    []  6MWD    []  LABS    []  CHEST CT    []  PET CT    []  Biopsy results

## 2024-12-16 ENCOUNTER — HOSPITAL ENCOUNTER (OUTPATIENT)
Dept: RADIOLOGY | Facility: HOSPITAL | Age: 74
Discharge: HOME | End: 2024-12-16
Attending: INTERNAL MEDICINE
Payer: MEDICARE

## 2024-12-16 DIAGNOSIS — Z12.31 ENCOUNTER FOR SCREENING MAMMOGRAM FOR BREAST CANCER: ICD-10-CM

## 2024-12-16 PROCEDURE — 77067 SCR MAMMO BI INCL CAD: CPT | Mod: 26,,, | Performed by: RADIOLOGY

## 2024-12-16 PROCEDURE — 77067 SCR MAMMO BI INCL CAD: CPT | Mod: TC

## 2024-12-16 PROCEDURE — 77063 BREAST TOMOSYNTHESIS BI: CPT | Mod: 26,,, | Performed by: RADIOLOGY

## 2024-12-18 DIAGNOSIS — N18.4 STAGE 4 CHRONIC KIDNEY DISEASE: Primary | ICD-10-CM

## 2025-01-20 NOTE — TELEPHONE ENCOUNTER
----- Message from Brandt Lopez MD sent at 4/26/2023  9:59 AM CDT -----  Please schedule a follow up with NP/PA at the main campus, next available.     Anesthesia Pre Eval Note    Anesthesia ROS/Med Hx    Overall Review:  EKG was reviewed     Anesthetic Complication History:    History of postoperative nausea & vomiting    Pulmonary Review:    Positive for asthma, well controlled  The patient is a former smoker.  (quit 1997)    Neuro/Psych Review:       Positive for psychiatric history - Depression    Cardiovascular Review:     Positive for hypertension  Positive for hyperlipidemia    GI/HEPATIC/RENAL Review:  Patient does not have a GI/hepatic/renalhistory       End/Other Review:  Positive for diabetes  Positive for obesity class II - 35.00 - 39.99  Positive for arthritis  Additional Results:  EKG:  Encounter Date: 01/20/25  -Electrocardiogram 12-Lead:        Result                      Value                           Ventricular Rate EKG/M*     96                              Atrial Rate (BPM)           96                              CO-Interval (MSEC)          150                             QRS-Interval (MSEC)         98                              QT-Interval (MSEC)          404                             QTc                         511                             P Axis (Degrees)            50                              R Axis (Degrees)            -15                             T Axis (Degrees)            7                               REPORT TEXT                                             Normal sinus rhythm   Low voltage QRS   Nonspecific T wave abnormality   Prolonged QT   Abnormal ECG   No previous ECGs available       ALLERGIES:   -- Empagliflozin -- DIZZINESS   -- Nuts   (Food) -- SWELLING, VOMITING, DIARRHEA and                            Other (See Comments)    --  Body edema, eyes well shut, hazelnuts   -- Turmeric -- GI UPSET and NAUSEA   -- Wasp Sting -- SWELLING    --  swelling   -- Codeine -- VOMITING and Other (See Comments)    --  Sweats, no effect   Past Medical History:  No date: Anemia  No date: Arthritis  04/30/2014: Asthma (CMD)       Comment:  -- Albuterol inhaler as needed, Leann Cardenas MD,               4/30/2014.   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 q.h.s., Leann Cardenas MD,                1/7/2015.   No date: CTS (carpal tunnel syndrome)  No date: Diabetes type 2, uncontrolled  No date: Essential hypertension  No date: Hyperlipidemia LDL goal < 100  No date: Obesity (BMI 30-39.9)  No date: PONV (postoperative nausea and vomiting)  01/07/2015: Sinusitis      Comment:  .-- Augmentin 875 mg p.o. b.i.d., Leann Cardenas MD, 1/7/2015.   Past Surgical History:  09/05/2003: Bariatric surgery      Comment:  Gastric Bypass  11/20/2024: Breast biopsy; Right  12/31/2024: Breast biopsy; Right      Comment:  sbb = Focal atypical ductal hyperplasia in a background                of flat epithelial atypia with associated                microcalcifications.  09/22/2009: Bronchoscopy,bx bronch/endobro  09/14/2021: Cataract extraction w/ intraocular lens implant; Right      Comment:  Dr. Carrasco  05/10/2019: Cheilectomy; Right      Comment:  Great toe  08/23/2018: Colonoscopy      Comment:  repeat in 7 years--Dr. Joseph  06/03/2008: Colonoscopy diagnostic      Comment:  Repeat 10 years 2018 06/18/2009: Dexa bone density axial skeleton  11/18/2015: Esophagogastroduodenoscopy  06/03/2008: Esophagogastroduodenoscopy transoral flex w/bx single or   mult      Comment:  Esophagogastroduodenoscopy with biopsy and esophageal                dilation.  11/25/2024: Extracapsular cataract removal w insert io lens prosth wo   ecp; Left      Comment:  Dr. Rob  04/04/2017: Knee arthroscopy w/ laser; Right      Relevant Problems   No relevant active problems       Physical Exam     Airway    Mallampati: II  TM Distance: >3 FB  Neck ROM: Full    Cardiovascular  Cardiovascular exam normal    Dental Exam    Dental Note: Cracked crown  Missing part of tooth    Pulmonary Exam  Pulmonary exam normal    Abdominal Exam    Patient Demonstrates:  Obese      Anesthesia Plan:    ASA Status: 2  Anesthesia Type: MAC      Post-op Pain Management: Per Surgeon      Checklist  Reviewed: Lab Results, Medications, Problem list, Allergies, Past Med History, Care Everywhere and NPO Status  Consent/Risks Discussed Statement:  The proposed anesthetic plan, including its risks and benefits, have been discussed with the Patient along with the risks and benefits of alternatives. Questions were encouraged and answered and the patient and/or representative understands and agrees to proceed.        I discussed with the patient (and/or patient's legal representative) the risks and benefits of the proposed anesthesia plan, MAC, which may include services performed by other anesthesia providers.    Alternative anesthesia plans, if available, were reviewed with the patient (and/or patient's legal representative). Discussion has been held with the patient (and/or patient's legal representative) regarding risks of anesthesia, which include conversion to general anesthesia, intra-operative awareness, nausea and vomiting and emergent situations that may require change in anesthesia plan.    The patient (and/or patient's legal representative) has indicated understanding, his/her questions have been answered, and he/she wishes to proceed with the planned anesthetic.

## 2025-01-25 ENCOUNTER — OFFICE VISIT (OUTPATIENT)
Dept: INTERNAL MEDICINE | Facility: CLINIC | Age: 75
End: 2025-01-25
Payer: MEDICARE

## 2025-01-25 ENCOUNTER — LAB VISIT (OUTPATIENT)
Dept: LAB | Facility: HOSPITAL | Age: 75
End: 2025-01-25
Payer: MEDICARE

## 2025-01-25 ENCOUNTER — OFFICE VISIT (OUTPATIENT)
Dept: NEPHROLOGY | Facility: CLINIC | Age: 75
End: 2025-01-25
Payer: MEDICARE

## 2025-01-25 VITALS
BODY MASS INDEX: 47.86 KG/M2 | DIASTOLIC BLOOD PRESSURE: 68 MMHG | HEIGHT: 65 IN | WEIGHT: 287.25 LBS | OXYGEN SATURATION: 98 % | HEART RATE: 71 BPM | SYSTOLIC BLOOD PRESSURE: 128 MMHG

## 2025-01-25 VITALS
WEIGHT: 282.88 LBS | DIASTOLIC BLOOD PRESSURE: 78 MMHG | BODY MASS INDEX: 47.13 KG/M2 | HEART RATE: 69 BPM | SYSTOLIC BLOOD PRESSURE: 164 MMHG | HEIGHT: 65 IN

## 2025-01-25 DIAGNOSIS — N18.32 STAGE 3B CHRONIC KIDNEY DISEASE: Primary | ICD-10-CM

## 2025-01-25 DIAGNOSIS — M1A.00X0 IDIOPATHIC CHRONIC GOUT WITHOUT TOPHUS, UNSPECIFIED SITE: ICD-10-CM

## 2025-01-25 DIAGNOSIS — N18.4 STAGE 4 CHRONIC KIDNEY DISEASE: ICD-10-CM

## 2025-01-25 DIAGNOSIS — N18.30 TYPE 2 DM WITH CKD STAGE 3 AND HYPERTENSION: ICD-10-CM

## 2025-01-25 DIAGNOSIS — E11.3513 TYPE 2 DIABETES MELLITUS WITH BOTH EYES AFFECTED BY PROLIFERATIVE RETINOPATHY AND MACULAR EDEMA, WITH LONG-TERM CURRENT USE OF INSULIN: ICD-10-CM

## 2025-01-25 DIAGNOSIS — N18.32 ANEMIA IN STAGE 3B CHRONIC KIDNEY DISEASE: ICD-10-CM

## 2025-01-25 DIAGNOSIS — E66.01 MORBID OBESITY: ICD-10-CM

## 2025-01-25 DIAGNOSIS — Z79.4 TYPE 2 DIABETES MELLITUS WITH BOTH EYES AFFECTED BY PROLIFERATIVE RETINOPATHY AND MACULAR EDEMA, WITH LONG-TERM CURRENT USE OF INSULIN: ICD-10-CM

## 2025-01-25 DIAGNOSIS — E11.22 TYPE 2 DM WITH CKD STAGE 3 AND HYPERTENSION: ICD-10-CM

## 2025-01-25 DIAGNOSIS — M48.061 SPINAL STENOSIS OF LUMBAR REGION, UNSPECIFIED WHETHER NEUROGENIC CLAUDICATION PRESENT: ICD-10-CM

## 2025-01-25 DIAGNOSIS — J44.9 CHRONIC OBSTRUCTIVE PULMONARY DISEASE, UNSPECIFIED COPD TYPE: ICD-10-CM

## 2025-01-25 DIAGNOSIS — I69.351 HEMIPLEGIA AND HEMIPARESIS FOLLOWING CEREBRAL INFARCTION AFFECTING RIGHT DOMINANT SIDE: ICD-10-CM

## 2025-01-25 DIAGNOSIS — I12.9 TYPE 2 DM WITH CKD STAGE 3 AND HYPERTENSION: ICD-10-CM

## 2025-01-25 DIAGNOSIS — D63.1 ANEMIA IN STAGE 3B CHRONIC KIDNEY DISEASE: ICD-10-CM

## 2025-01-25 DIAGNOSIS — E11.42 TYPE 2 DIABETES MELLITUS WITH DIABETIC POLYNEUROPATHY, WITH LONG-TERM CURRENT USE OF INSULIN: Primary | ICD-10-CM

## 2025-01-25 DIAGNOSIS — Z79.4 TYPE 2 DIABETES MELLITUS WITH DIABETIC POLYNEUROPATHY, WITH LONG-TERM CURRENT USE OF INSULIN: Primary | ICD-10-CM

## 2025-01-25 DIAGNOSIS — I13.0 HYPERTENSIVE HEART AND CHRONIC KIDNEY DISEASE WITH HEART FAILURE AND STAGE 1 THROUGH STAGE 4 CHRONIC KIDNEY DISEASE, OR UNSPECIFIED CHRONIC KIDNEY DISEASE: ICD-10-CM

## 2025-01-25 DIAGNOSIS — I51.89 DIASTOLIC DYSFUNCTION: ICD-10-CM

## 2025-01-25 DIAGNOSIS — N25.81 SECONDARY HYPERPARATHYROIDISM: ICD-10-CM

## 2025-01-25 DIAGNOSIS — Z86.73 HISTORY OF STROKE: ICD-10-CM

## 2025-01-25 DIAGNOSIS — I10 ESSENTIAL HYPERTENSION: ICD-10-CM

## 2025-01-25 DIAGNOSIS — N28.89 LEFT RENAL MASS: ICD-10-CM

## 2025-01-25 DIAGNOSIS — N18.4 STAGE 4 CHRONIC KIDNEY DISEASE: Primary | ICD-10-CM

## 2025-01-25 DIAGNOSIS — D63.1 ANEMIA ASSOCIATED WITH CHRONIC RENAL FAILURE: ICD-10-CM

## 2025-01-25 DIAGNOSIS — N18.9 ANEMIA ASSOCIATED WITH CHRONIC RENAL FAILURE: ICD-10-CM

## 2025-01-25 LAB
ALBUMIN SERPL BCP-MCNC: 3.5 G/DL (ref 3.5–5.2)
ANION GAP SERPL CALC-SCNC: 10 MMOL/L (ref 8–16)
BASOPHILS # BLD AUTO: 0.05 K/UL (ref 0–0.2)
BASOPHILS NFR BLD: 0.8 % (ref 0–1.9)
BUN SERPL-MCNC: 33 MG/DL (ref 8–23)
CALCIUM SERPL-MCNC: 9.9 MG/DL (ref 8.7–10.5)
CHLORIDE SERPL-SCNC: 103 MMOL/L (ref 95–110)
CO2 SERPL-SCNC: 28 MMOL/L (ref 23–29)
CREAT SERPL-MCNC: 1.7 MG/DL (ref 0.5–1.4)
DIFFERENTIAL METHOD BLD: ABNORMAL
EOSINOPHIL # BLD AUTO: 0.3 K/UL (ref 0–0.5)
EOSINOPHIL NFR BLD: 4.2 % (ref 0–8)
ERYTHROCYTE [DISTWIDTH] IN BLOOD BY AUTOMATED COUNT: 15.2 % (ref 11.5–14.5)
EST. GFR  (NO RACE VARIABLE): 31.1 ML/MIN/1.73 M^2
GLUCOSE SERPL-MCNC: 208 MG/DL (ref 70–110)
HCT VFR BLD AUTO: 39.8 % (ref 37–48.5)
HGB BLD-MCNC: 12.2 G/DL (ref 12–16)
IMM GRANULOCYTES # BLD AUTO: 0.03 K/UL (ref 0–0.04)
IMM GRANULOCYTES NFR BLD AUTO: 0.5 % (ref 0–0.5)
LYMPHOCYTES # BLD AUTO: 1.4 K/UL (ref 1–4.8)
LYMPHOCYTES NFR BLD: 21.5 % (ref 18–48)
MCH RBC QN AUTO: 26.6 PG (ref 27–31)
MCHC RBC AUTO-ENTMCNC: 30.7 G/DL (ref 32–36)
MCV RBC AUTO: 87 FL (ref 82–98)
MONOCYTES # BLD AUTO: 0.6 K/UL (ref 0.3–1)
MONOCYTES NFR BLD: 9.4 % (ref 4–15)
NEUTROPHILS # BLD AUTO: 4.2 K/UL (ref 1.8–7.7)
NEUTROPHILS NFR BLD: 63.6 % (ref 38–73)
NRBC BLD-RTO: 0 /100 WBC
PHOSPHATE SERPL-MCNC: 3.9 MG/DL (ref 2.7–4.5)
PLATELET # BLD AUTO: 262 K/UL (ref 150–450)
PMV BLD AUTO: 10.8 FL (ref 9.2–12.9)
POTASSIUM SERPL-SCNC: 4.2 MMOL/L (ref 3.5–5.1)
RBC # BLD AUTO: 4.59 M/UL (ref 4–5.4)
SODIUM SERPL-SCNC: 141 MMOL/L (ref 136–145)
WBC # BLD AUTO: 6.62 K/UL (ref 3.9–12.7)

## 2025-01-25 PROCEDURE — 99214 OFFICE O/P EST MOD 30 MIN: CPT | Mod: S$GLB,,, | Performed by: INTERNAL MEDICINE

## 2025-01-25 PROCEDURE — 3078F DIAST BP <80 MM HG: CPT | Mod: CPTII,S$GLB,, | Performed by: INTERNAL MEDICINE

## 2025-01-25 PROCEDURE — 1101F PT FALLS ASSESS-DOCD LE1/YR: CPT | Mod: CPTII,S$GLB,, | Performed by: INTERNAL MEDICINE

## 2025-01-25 PROCEDURE — 1125F AMNT PAIN NOTED PAIN PRSNT: CPT | Mod: CPTII,S$GLB,, | Performed by: INTERNAL MEDICINE

## 2025-01-25 PROCEDURE — 3074F SYST BP LT 130 MM HG: CPT | Mod: CPTII,S$GLB,, | Performed by: INTERNAL MEDICINE

## 2025-01-25 PROCEDURE — 36415 COLL VENOUS BLD VENIPUNCTURE: CPT | Performed by: NURSE PRACTITIONER

## 2025-01-25 PROCEDURE — 99214 OFFICE O/P EST MOD 30 MIN: CPT | Mod: S$GLB,,, | Performed by: NURSE PRACTITIONER

## 2025-01-25 PROCEDURE — 99999 PR PBB SHADOW E&M-EST. PATIENT-LVL V: CPT | Mod: PBBFAC,,, | Performed by: NURSE PRACTITIONER

## 2025-01-25 PROCEDURE — G2211 COMPLEX E/M VISIT ADD ON: HCPCS | Mod: S$GLB,,, | Performed by: INTERNAL MEDICINE

## 2025-01-25 PROCEDURE — 1159F MED LIST DOCD IN RCRD: CPT | Mod: CPTII,S$GLB,, | Performed by: INTERNAL MEDICINE

## 2025-01-25 PROCEDURE — 80069 RENAL FUNCTION PANEL: CPT | Performed by: NURSE PRACTITIONER

## 2025-01-25 PROCEDURE — 1126F AMNT PAIN NOTED NONE PRSNT: CPT | Mod: CPTII,S$GLB,, | Performed by: NURSE PRACTITIONER

## 2025-01-25 PROCEDURE — 1159F MED LIST DOCD IN RCRD: CPT | Mod: CPTII,S$GLB,, | Performed by: NURSE PRACTITIONER

## 2025-01-25 PROCEDURE — 3288F FALL RISK ASSESSMENT DOCD: CPT | Mod: CPTII,S$GLB,, | Performed by: NURSE PRACTITIONER

## 2025-01-25 PROCEDURE — 3288F FALL RISK ASSESSMENT DOCD: CPT | Mod: CPTII,S$GLB,, | Performed by: INTERNAL MEDICINE

## 2025-01-25 PROCEDURE — 3077F SYST BP >= 140 MM HG: CPT | Mod: CPTII,S$GLB,, | Performed by: NURSE PRACTITIONER

## 2025-01-25 PROCEDURE — 85025 COMPLETE CBC W/AUTO DIFF WBC: CPT | Performed by: NURSE PRACTITIONER

## 2025-01-25 PROCEDURE — 3078F DIAST BP <80 MM HG: CPT | Mod: CPTII,S$GLB,, | Performed by: NURSE PRACTITIONER

## 2025-01-25 PROCEDURE — 1160F RVW MEDS BY RX/DR IN RCRD: CPT | Mod: CPTII,S$GLB,, | Performed by: INTERNAL MEDICINE

## 2025-01-25 PROCEDURE — 1101F PT FALLS ASSESS-DOCD LE1/YR: CPT | Mod: CPTII,S$GLB,, | Performed by: NURSE PRACTITIONER

## 2025-01-25 PROCEDURE — 99999 PR PBB SHADOW E&M-EST. PATIENT-LVL V: CPT | Mod: PBBFAC,,, | Performed by: INTERNAL MEDICINE

## 2025-01-25 RX ORDER — COLCHICINE 0.6 MG/1
TABLET ORAL
Qty: 30 TABLET | Refills: 2 | Status: SHIPPED | OUTPATIENT
Start: 2025-01-25

## 2025-01-25 RX ORDER — CHLORTHALIDONE 25 MG/1
25 TABLET ORAL DAILY
Qty: 90 TABLET | Refills: 3 | Status: SHIPPED | OUTPATIENT
Start: 2025-01-25

## 2025-01-25 NOTE — PROGRESS NOTES
"Subjective:       Patient ID: Annika Mac is a 74 y.o. female.    Chief Complaint: Numbness (Mostly right hand but notices numbness throughout entire right side ) and Follow-up  This is a 74-year-old who presents today for follow-up.  Patient reports that she has a doing fairly well diabetes has been controlled and she is due for follow-up with her endocrinology  She remains on her medication she has had occasional gout flare and requesting a refill of her colchicine which she uses on rare occasion.  She has had some paresthesias primarily in the right hand intermittently she also gets some urinary odor but no burning or symptoms associated no discharge.  Her umbilical rash cleared since last visit.  She continues have arthritis in her back and joints but relatively stable over time no recent falls and she uses her walker    Follow-up  Associated symptoms include arthralgias. Pertinent negatives include no chest pain.     Review of Systems   Cardiovascular:  Negative for chest pain.   Genitourinary:  Negative for dysuria.        Urinary odor    Musculoskeletal:  Positive for arthralgias and back pain.        Parasthesias right hand        Objective:      Blood pressure 128/68, pulse 71, height 5' 5" (1.651 m), weight 130.3 kg (287 lb 4.2 oz), SpO2 98%.   Physical Exam  Constitutional:       General: She is not in acute distress.     Comments: Walks with walker    HENT:      Head: Normocephalic.      Mouth/Throat:      Pharynx: Oropharynx is clear.   Eyes:      General: No scleral icterus.  Cardiovascular:      Rate and Rhythm: Normal rate and regular rhythm.      Heart sounds: Normal heart sounds. No murmur heard.     No friction rub. No gallop.   Pulmonary:      Effort: Pulmonary effort is normal. No respiratory distress.      Breath sounds: Normal breath sounds.   Abdominal:      General: Bowel sounds are normal.      Palpations: Abdomen is soft. There is no mass.      Tenderness: There is no abdominal " tenderness.   Musculoskeletal:         General: Swelling present.      Cervical back: Neck supple.   Skin:     Findings: No erythema.   Neurological:      Mental Status: She is alert.   Psychiatric:         Mood and Affect: Mood normal.       Assessment:       1. Type 2 diabetes mellitus with stage 3b chronic kidney disease and hypertension    2. Type 2 diabetes mellitus with diabetic polyneuropathy, with long-term current use of insulin    3. History of stroke    4. Essential hypertension    5. Idiopathic chronic gout without tophus, unspecified site    6. Spinal stenosis of lumbar region, unspecified whether neurogenic claudication present    7. Anemia in stage 3 chronic kidney disease, unspecified whether stage 3a or 3b CKD    8. Hemiplegia and hemiparesis following cerebral infarction affecting right dominant side    9. Morbid obesity    10. Hypertensive heart and chronic kidney disease with heart failure and stage 1 through stage 4 chronic kidney disease, or unspecified chronic kidney disease    11. Type 2 diabetes mellitus with both eyes affected by proliferative retinopathy and macular edema, with long-term current use of insulin    12. Stage 4 chronic kidney disease    13. Chronic obstructive pulmonary disease, unspecified COPD type        Plan:       Annika was seen today for numbness and follow-up.    Diagnoses and all orders for this visit:    Type 2 diabetes mellitus with stage 3b chronic kidney disease and hypertension  -  Type 2 diabetes mellitus with diabetic polyneuropathy, with long-term current use of insulin  She follows with endocrinology and plans to call to schedule remains on Trulicity Jardiance Tresiba and Humalog    History of stroke  Stable     Essential hypertension  Blood pressure acceptable    Idiopathic chronic gout without tophus, unspecified site  History of remains on colchicine    Spinal stenosis of lumbar region, unspecified whether neurogenic claudication present  Conservative  measures fall precautions she continues to use walker    Anemia in stage 3 chronic kidney disease, unspecified whether stage 3a or 3b CKD  Follwos with neprhology has labs today     Hemiplegia and hemiparesis following cerebral infarction affecting right dominant side  Stable with some neuropathy    Morbid obesity  Weight down with Trulicity and Jardiance    Hypertensive heart and chronic kidney disease with heart failure and stage 1 through stage 4 chronic kidney disease, or unspecified chronic kidney disease  Euvolemic  Patient has follow-up with Nephrology with recent labs    Type 2 diabetes mellitus with both eyes affected by proliferative retinopathy and macular edema, with long-term current use of insulin  Follows with Ophthalmology    She is up-to-date on her mammogram    Urinary odor without symptoms maybe medication related she has already dropped off a urine with Nephrology for review      Paresthesias we discussed carpal tunnel splint see if helps with symptoms in her hand conservative measures ortho if symptoms persist      Gout remains on allopurinol with occasionsl use of colchicine   -     colchicine (COLCRYS) 0.6 mg tablet; Take 1 tablet twice daily for 3 days then 1 tablet daily for 2 days then stop  for gout flare  -     chlorthalidone (HYGROTEN) 25 MG Tab; Take 1 tablet (25 mg total) by mouth once daily.          Visit today included increased complexity associated with the care of the episodic problem hypertension ,chronic kidney disease , gout   type 2 diabetes , hx stroke  addressed and managing the longitudinal care of the patient due to the serious and/or complex managed problem(s).

## 2025-01-25 NOTE — PROGRESS NOTES
Subjective:       Patient ID: Annika Mac is a 74 y.o.  female who presents for follow-up evaluation of CKD.    HPI     Patient is new to me. Last seen by Dr. Cancino in 2024. Followed by Dr. Elias prior to this. Prior pertinent chart reviewed since this is patient's first appointment with me.    Patient presents for Follow-up of CKD.  Baseline creatinine of 1.6-1.9. Recent uptrend to 2.2 during admission for colitis in July; now ranging near 2.0.     Significant other medical problems include T2DM with retinopathy, left renal mass, HTN, diastolic dysfunction, RUFINA, h/o stroke, chronic gout, lumbar stenosis, GERD, MARION, secondary hyperparathyroidism. Reports previously taken off of irbesartan for angioedema. She sometimes feels dizzy with standing. She wears her CPAP at night. She drinks about 5-6, 16 oz bottles per day.     The patient denies taking NSAIDs, herbal supplements, or new antibiotics, recreational drugs, recent episode of dehydration, diarrhea, nausea or vomiting, acute illness, hospitalization or exposure to IV radiocontrast.     Significant family hx includes:   Mother Diabetes    Hypertension   Father ( at age 60s) Heart disease   Sister Diabetes   Sister () No Known Problems   Brother () No Known Problems   Brother Thyroid disease    Diabetes    Hypertension   Daughter No Known Problems   Daughter No Known Problems   Son No Known Problems       Last renal US: 19 , reviewed.     FINDINGS:  The kidneys are normal sized. The right kidney measures 10.2cm.  The left kidney measures 10.3cm.  There is appropriate cortical echogenicity. Corticomedullary distinction is reduced bilaterally.  There is mild bilateral cortical thinning present. Perfusion of the right kidney is appropriate and perfusion of the left kidney is decreased. The resistive index of the right kidney is 0.8.  The resistive index of the left kidney is 0.82.  These findings are all consistent with chronic  "renal disease slightly worse on the left than the right.     There are bilateral renal cysts present.  Two on the right.  The largest measures 2.6 x 2.9 x 2.1 cm.  On the left there is a 1.7 x 1.2 x 1.5 renal cyst which could correspond to the abnormality noted on the CT scan.  However there is a new solid mass lesion close to the previously described cyst measuring 0.8 x 0.9 x 0.9 cm.  This appears to be solid and is echogenic.  As stated this was not present on the patient's previous renal ultrasound.  Further assessment with renal contrast ultrasound study is recommended.  This could represent an angiomyolipoma but other solid mass lesions can not be excluded.  There is no hydronephrosis, nephrolithiasis or solid renal masses.  The bladder was partially distended and appears unremarkable     Impression:     1. Interval development of small solid mass density in the left kidney measuring 9 mm.  An ultrasound contrast study is recommended for further characterization  2. The lesion noted on the CT January 12 2020 CT scan probably represents a renal cyst which was present on previous examinations  3. Small right renal cysts  4. Chronic renal disease  1.  This report was flagged in Epic as abnormal.    Update 1/25/24:  - Presents for follow-up of CKD.   - Drinks 3-4 bottles of water per day  - Still with intermittent dizziness when standing  - BP elevated today due to long walk between appointments  - Notes malodorous urine. No dysuria, hematuria, frequency or urgency    Review of Systems   Respiratory:  Negative for shortness of breath.    Cardiovascular:  Negative for leg swelling.   Gastrointestinal:  Negative for diarrhea, nausea and vomiting.   Genitourinary:  Negative for difficulty urinating, dysuria and hematuria.   Neurological:  Positive for dizziness (with standing). Negative for syncope.   All other systems reviewed and are negative.      Objective:       Blood pressure (!) 164/78, pulse 69, height 5' 5" " (1.651 m), weight 128.3 kg (282 lb 13.6 oz).  Physical Exam  Vitals reviewed.   Constitutional:       General: She is not in acute distress.     Appearance: Normal appearance. She is obese.   HENT:      Head: Normocephalic and atraumatic.   Eyes:      General: No scleral icterus.  Cardiovascular:      Rate and Rhythm: Normal rate and regular rhythm.   Pulmonary:      Effort: Pulmonary effort is normal. No respiratory distress.      Breath sounds: No wheezing or rales.   Musculoskeletal:      Right lower leg: No edema.      Left lower leg: No edema.   Skin:     General: Skin is warm and dry.   Neurological:      Mental Status: She is alert and oriented to person, place, and time.   Psychiatric:         Mood and Affect: Mood normal.         Behavior: Behavior normal.         Lab Results   Component Value Date    CREATININE 1.7 (H) 01/25/2025    URICACID 5.4 04/20/2023     Prot/Creat Ratio, Urine   Date Value Ref Range Status   01/25/2025 0.17 0.00 - 0.20 Final   05/22/2024 Unable to calculate 0.00 - 0.20 Final   11/13/2019 Unable to calculate 0.00 - 0.20 Final     Lab Results   Component Value Date     11/04/2024    K 4.0 11/04/2024    CO2 26 11/04/2024     11/04/2024     Lab Results   Component Value Date    .4 (H) 05/22/2024    CALCIUM 9.9 01/25/2025    CAION 1.27 02/28/2012    PHOS 3.9 01/25/2025     Lab Results   Component Value Date    HGB 12.2 01/25/2025    WBC 6.62 01/25/2025    HCT 39.8 01/25/2025      Lab Results   Component Value Date    HGBA1C 7.1 (H) 11/04/2024     01/25/2025    BUN 33 (H) 01/25/2025     Lab Results   Component Value Date    LDLCALC 73.0 11/04/2024         Assessment:       1. Stage 3b chronic kidney disease    2. Left renal mass    3. Essential hypertension    4. Anemia in stage 3b chronic kidney disease    5. Diastolic dysfunction    6. Morbid obesity    7. Secondary hyperparathyroidism    8. Type 2 DM with CKD stage 3 and hypertension          Plan:   CKD stage  3b c eGFR 31 mL/min -  - Baseline Cr appears to be about 1.6-1.9. Uptrend previously to 2.0 with colitis.   - CKD clinically related to longstanding T2DM (with retinopathy and neuropathy) and HTN  - Encouraged increased hydration. Avoid NSAIDs. Continue SGLT2i. Had angioedema with ARB.     UPCR Minimal. Continue SGLT2i   Acid-base Stable   Secondary hyperparathyroidism Ca and phos okay. PTH elevated, stable in CKD.    Anemia WNL   DM Now controlled. Reported retinopathy. Agree with SGLT2i.    Lipid Management Continue statin   ESRD planning Provided education about the stages of CKD, usual progression, and need to monitor for and treat CKD-related disease in an effort to delay progression of CKD.     Plan for transplant referral when eGFR 22mL/min. However BMI may preclude transplant (goal < 38), weight loss is encouraged.      HTN - Overall controlled on current regimen. She does report intermittent dizziness with standing. Would recommend alternative to chlorthalidone if persists. Continue adequate hydration.     Diastolic dysfunction - Grade II DD on last echo. Continue with cardiology.     Left renal mass - previously followed by Urology. Recommend f/u. Will place referral.     All questions patient had were answered.  Asked if further questions. None. F/u in clinic 3 months  with labs and urine prior to next visit or sooner if needed.  ER for emergency concerns.    Summary of Plan:  - Refer to Urology for follow-up of renal mass  - RTC 3 months for monitoring

## 2025-01-30 ENCOUNTER — OFFICE VISIT (OUTPATIENT)
Dept: PODIATRY | Facility: CLINIC | Age: 75
End: 2025-01-30
Payer: MEDICARE

## 2025-01-30 VITALS
HEART RATE: 65 BPM | DIASTOLIC BLOOD PRESSURE: 25 MMHG | HEIGHT: 65 IN | WEIGHT: 282.19 LBS | RESPIRATION RATE: 18 BRPM | BODY MASS INDEX: 47.02 KG/M2 | SYSTOLIC BLOOD PRESSURE: 129 MMHG

## 2025-01-30 DIAGNOSIS — L84 CORN OR CALLUS: ICD-10-CM

## 2025-01-30 DIAGNOSIS — E11.42 DIABETIC POLYNEUROPATHY ASSOCIATED WITH TYPE 2 DIABETES MELLITUS: Primary | ICD-10-CM

## 2025-01-30 DIAGNOSIS — B35.1 ONYCHOMYCOSIS DUE TO DERMATOPHYTE: ICD-10-CM

## 2025-01-30 PROCEDURE — 99999 PR PBB SHADOW E&M-EST. PATIENT-LVL IV: CPT | Mod: PBBFAC,,, | Performed by: PODIATRIST

## 2025-01-30 PROCEDURE — 11057 PARNG/CUTG B9 HYPRKR LES >4: CPT | Mod: Q9,S$GLB,, | Performed by: PODIATRIST

## 2025-01-30 PROCEDURE — 99499 UNLISTED E&M SERVICE: CPT | Mod: S$GLB,,, | Performed by: PODIATRIST

## 2025-01-30 PROCEDURE — 11721 DEBRIDE NAIL 6 OR MORE: CPT | Mod: 59,Q9,S$GLB, | Performed by: PODIATRIST

## 2025-02-01 NOTE — PROGRESS NOTES
Subjective:      Patient ID: Annika Mac is a 74 y.o. female.    Chief Complaint: Diabetic Foot Exam (Mamie Cotton MD at 1/25/2025) and Nail Care      Annika is a 74 y.o. female who presents to the clinic for evaluation and treatment of high risk feet. Annika has a past medical history of Allergy, Angio-edema, Asthma, Chronic obstructive pulmonary disease, unspecified COPD type (01/14/2022), Constipation (10/03/2019), Deep vein thrombophlebitis of left leg (07/27/2012), Deep vein thrombosis, Diabetic foot ulcer with osteomyelitis, Encounter for blood transfusion, GERD (gastroesophageal reflux disease), Obesity, diabetes, and hypertension syndrome (02/13/2019), Obstructive sleep apnea (07/27/2012), PAD (peripheral artery disease) (11/21/2013), Pressure ulcer of toe of left foot, Type 2 diabetes mellitus with obesity (08/19/2020), and Type 2 diabetes mellitus with peripheral artery disease (08/19/2020). The patient's chief complaint is long, thick toenails. This patient has documented high risk feet requiring routine maintenance secondary to peripheral neuropathy.    PCP: Mamie Cotton MD    Date Last Seen by PCP:   Chief Complaint   Patient presents with    Diabetic Foot Exam     Mamie Cotton MD at 1/25/2025    [facility]         Current shoe gear:  Affected Foot: Rx diabetic extra depth shoes and custom accommodative insoles     Unaffected Foot: Rx diabetic extra depth shoes and custom accommodative insoles    Hemoglobin A1C   Date Value Ref Range Status   11/04/2024 7.1 (H) 4.0 - 5.6 % Final     Comment:     ADA Screening Guidelines:  5.7-6.4%  Consistent with prediabetes  >or=6.5%  Consistent with diabetes    High levels of fetal hemoglobin interfere with the HbA1C  assay. Heterozygous hemoglobin variants (HbS, HgC, etc)do  not significantly interfere with this assay.   However, presence of multiple variants may affect accuracy.     03/05/2024 7.0 (H) 4.0 - 5.6 % Final      Comment:     ADA Screening Guidelines:  5.7-6.4%  Consistent with prediabetes  >or=6.5%  Consistent with diabetes    High levels of fetal hemoglobin interfere with the HbA1C  assay. Heterozygous hemoglobin variants (HbS, HgC, etc)do  not significantly interfere with this assay.   However, presence of multiple variants may affect accuracy.     10/24/2023 7.5 (H) 4.0 - 5.6 % Final     Comment:     ADA Screening Guidelines:  5.7-6.4%  Consistent with prediabetes  >or=6.5%  Consistent with diabetes    High levels of fetal hemoglobin interfere with the HbA1C  assay. Heterozygous hemoglobin variants (HbS, HgC, etc)do  not significantly interfere with this assay.   However, presence of multiple variants may affect accuracy.         Review of Systems   Constitutional: Negative for chills, fever and malaise/fatigue.   HENT:  Negative for hearing loss.    Cardiovascular:  Positive for leg swelling. Negative for claudication.   Respiratory:  Negative for hemoptysis and shortness of breath.    Skin:  Positive for color change, dry skin and nail changes. Negative for flushing, itching and rash.   Musculoskeletal:  Negative for joint pain and myalgias.   Neurological:  Positive for numbness, paresthesias and sensory change. Negative for loss of balance.   Psychiatric/Behavioral:  Negative for altered mental status.            Objective:      Physical Exam  Vitals reviewed.   Constitutional:       Appearance: She is well-developed.   Cardiovascular:      Pulses:           Dorsalis pedis pulses are 0 on the right side and 0 on the left side.        Posterior tibial pulses are 1+ on the right side and 1+ on the left side.   Musculoskeletal:      Right lower leg: Edema present.      Left lower leg: Edema present.      Right ankle: Decreased range of motion. Abnormal pulse.      Right Achilles Tendon: Patel's test negative.      Left ankle: Decreased range of motion. Abnormal pulse.      Left Achilles Tendon: Patel's test  negative.      Right foot: Decreased range of motion.      Left foot: Decreased range of motion.   Feet:      Right foot:      Protective Sensation: 5 sites tested.  2 sites sensed.      Left foot:      Protective Sensation: 5 sites tested.  2 sites sensed.   Skin:     General: Skin is warm and dry.      Capillary Refill: Capillary refill takes more than 3 seconds.      Comments: Nails x10 are elongated by 4-5mm's, thickened by 2-4 mm's, dystrophic, and are yellowish in  coloration . Xerosis Bilaterally. No open lesions noted.   HKLs noted to b/L 1st met, distal 2nd and 3rd digits b/L   Neurological:      Mental Status: She is alert.      Comments: diminished sensation noted to b/L lower extremities   Psychiatric:         Behavior: Behavior normal. Behavior is cooperative.               Assessment:       Encounter Diagnoses   Name Primary?    Diabetic polyneuropathy associated with type 2 diabetes mellitus Yes    Corn or callus     Onychomycosis due to dermatophyte          Plan:       Annika was seen today for diabetic foot exam and nail care.    Diagnoses and all orders for this visit:    Diabetic polyneuropathy associated with type 2 diabetes mellitus    Corn or callus    Onychomycosis due to dermatophyte      I counseled the patient on her conditions, their implications and medical management.      - Shoe inspection. Diabetic Foot Education. Patient reminded of the importance of good nutrition and blood sugar control to help prevent podiatric complications of diabetes. Patient instructed on proper foot hygeine. We discussed wearing proper shoe gear, daily foot inspections, never walking without protective shoe gear, never putting sharp instruments to feet, routine podiatric nail visits every 2-3 months.    After cleansing with an alcohol prep pad, the about mentioned hyperkeratotic lesions were sharply debrided X 6 utilizing a #15 blade to a smooth base without incident. Pt tolerated the procedure well and  reported comfort to the debarment sites. Pt will continue to use padding and moisture the callused areas.     - With patient's permission, nails were aggressively reduced and debrided x 10 to their soft tissue attachment mechanically removing all offending nail and debris. Patient relates relief following the procedure. She will continue to monitor the areas daily, inspect her feet, wear protective shoe gear when ambulatory, moisturizer to maintain skin integrity and follow in this office in approximately 2-3 months, sooner p.r.n.

## 2025-02-13 NOTE — INTERVAL H&P NOTE
No significant changes were noted from the H&P or last clinic note.    The risks and benefits of this intervention, and alternative therapies were discussed with the patient.  The discussion of risks included infection, bleeding, need for additional procedures or surgery, nerve damage, paralysis, adverse medication reaction(s), stroke, and/or death.  Questions regarding the procedure, risks, expected outcome, and possible side effects were solicited and answered to the patient's satisfaction.  Annika Mac wishes to proceed with the injection or procedure.  Written consent was obtained.    Alireza Meraz Jr, MD  Interventional Pain Medicine / Anesthesiology    
Yes

## 2025-02-14 ENCOUNTER — OFFICE VISIT (OUTPATIENT)
Dept: DERMATOLOGY | Facility: CLINIC | Age: 75
End: 2025-02-14
Payer: MEDICARE

## 2025-02-14 DIAGNOSIS — L66.81 CENTRAL CENTRIFUGAL CICATRICIAL ALOPECIA: Primary | ICD-10-CM

## 2025-02-14 DIAGNOSIS — L65.8 TRACTION ALOPECIA: ICD-10-CM

## 2025-02-14 DIAGNOSIS — L66.9 CICATRICIAL ALOPECIA: ICD-10-CM

## 2025-02-14 PROCEDURE — 99999 PR PBB SHADOW E&M-EST. PATIENT-LVL V: CPT | Mod: PBBFAC,,, | Performed by: STUDENT IN AN ORGANIZED HEALTH CARE EDUCATION/TRAINING PROGRAM

## 2025-02-14 RX ORDER — DOXYCYCLINE HYCLATE 20 MG
TABLET ORAL
Qty: 120 TABLET | Refills: 3 | Status: SHIPPED | OUTPATIENT
Start: 2025-02-14

## 2025-02-14 RX ORDER — CLOBETASOL PROPIONATE 0.5 MG/ML
SOLUTION TOPICAL
Qty: 50 ML | Refills: 2 | Status: SHIPPED | OUTPATIENT
Start: 2025-02-14

## 2025-02-14 NOTE — PROGRESS NOTES
Subjective:      Patient ID:  Annika Mac is a 74 y.o. female who presents for No chief complaint on file.    LV  with me    Interval: 2/14/2025:  Pt here today for hair loss follow up. Reports around the same and still falling out.  Home treatment consists of:   Continue clobetasol solution to scalp daily  Continue ciclopirox shampoo to scalp twice weekly (says this shampoo makes her hair dry)  Start Vitamin D 2000 IU daily (over the counter)  started doxycycline 20 mg BID   Wants to hold off on ILK due to insurance uncertainty and cost  Still itchy, 10/10 severity    Initial hx with me: 8/14/2024   1. Hair loss  Former pt of Dr. Curran, last een 4-  Duration: >3 years  Itching (scale 1-10): 7/10  Current tx: ketoconazole shampoo  Prior tx: Rogaine, finasteride, ketoconazole shampoo, ILK, fluocinolone oil  PMHx CKD, T2DM, HTN  Prior hx tight ebony  Loss at vertex and frontal scalp          Review of Systems    Objective:   Physical Exam     Diagram Legend     Erythematous scaling macule/papule c/w actinic keratosis       Vascular papule c/w angioma      Pigmented verrucoid papule/plaque c/w seborrheic keratosis      Yellow umbilicated papule c/w sebaceous hyperplasia      Irregularly shaped tan macule c/w lentigo     1-2 mm smooth white papules consistent with Milia      Movable subcutaneous cyst with punctum c/w epidermal inclusion cyst      Subcutaneous movable cyst c/w pilar cyst      Firm pink to brown papule c/w dermatofibroma      Pedunculated fleshy papule(s) c/w skin tag(s)      Evenly pigmented macule c/w junctional nevus     Mildly variegated pigmented, slightly irregular-bordered macule c/w mildly atypical nevus      Flesh colored to evenly pigmented papule c/w intradermal nevus       Pink pearly papule/plaque c/w basal cell carcinoma      Erythematous hyperkeratotic cursted plaque c/w SCC      Surgical scar with no sign of skin cancer recurrence      Open and closed comedones       Inflammatory papules and pustules      Verrucoid papule consistent consistent with wart     Erythematous eczematous patches and plaques     Dystrophic onycholytic nail with subungual debris c/w onychomycosis     Umbilicated papule    Erythematous-base heme-crusted tan verrucoid plaque consistent with inflamed seborrheic keratosis     Erythematous Silvery Scaling Plaque c/w Psoriasis     See annotation                        Assessment / Plan:        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 Etiology likely multifactorial, but we think there may be genetic predisposition. Reviewed primary goal of treatment is to stop progresssion/stabilize and treat any symptoms. Secondary goal is regrowth of lost hair, but counseled that regrowth takes time/consistent treatment at least 6-12 months to see results of treatment and sometimes can be difficult in scarred areas.  Encourage natural hairstyles free of chemicals, heat and avoiding glue for wigs     Plan:  Serial ILK offered again today - pt declined  Continue clobetasol solution to scalp daily  Can switch from ciclopirox shampoo (She says that and ketoconazole too drying) to head and shoulders royal oils line and see if this is better, once weekly  Continue Vitamin D 2000 IU daily (over the counter)  Continue minoxidil 5% solution or foam daily (men's strength) cheapest in bulk [employer] or [employer] or [employer]  Continue doxycycline 20  mg BID (low dose, sub-antimicrobial can be beneficial in CCCA but need to be continued consistently) (started )     RTC 6 mo, sooner prn

## 2025-02-14 NOTE — PATIENT INSTRUCTIONS
Plan:  Continue doxycycline 20 mg twice daily  Continue clobetasol scalp liquid to affected areas Monday-Friday, weekends off  Continue OTC Vitamin D 2000 IU daily  Switch shampoo to zinc pyrithione- Head and Shoulders Royal Oils line- once weekly   to apply conditioner to ends

## 2025-03-24 DIAGNOSIS — Z00.00 ENCOUNTER FOR MEDICARE ANNUAL WELLNESS EXAM: ICD-10-CM

## 2025-04-08 ENCOUNTER — OFFICE VISIT (OUTPATIENT)
Dept: URGENT CARE | Facility: CLINIC | Age: 75
End: 2025-04-08
Payer: MEDICARE

## 2025-04-08 VITALS
HEIGHT: 65 IN | RESPIRATION RATE: 18 BRPM | BODY MASS INDEX: 45.32 KG/M2 | HEART RATE: 64 BPM | WEIGHT: 272 LBS | OXYGEN SATURATION: 98 % | DIASTOLIC BLOOD PRESSURE: 67 MMHG | TEMPERATURE: 98 F | SYSTOLIC BLOOD PRESSURE: 154 MMHG

## 2025-04-08 DIAGNOSIS — R51.9 NONINTRACTABLE HEADACHE, UNSPECIFIED CHRONICITY PATTERN, UNSPECIFIED HEADACHE TYPE: Primary | ICD-10-CM

## 2025-04-08 PROCEDURE — 99214 OFFICE O/P EST MOD 30 MIN: CPT | Mod: S$GLB,,, | Performed by: PHYSICIAN ASSISTANT

## 2025-04-08 RX ORDER — BUTALBITAL, ACETAMINOPHEN AND CAFFEINE 50; 325; 40 MG/1; MG/1; MG/1
1 TABLET ORAL EVERY 4 HOURS PRN
Qty: 12 TABLET | Refills: 0 | Status: SHIPPED | OUTPATIENT
Start: 2025-04-08

## 2025-04-08 RX ORDER — BUTALBITAL, ACETAMINOPHEN AND CAFFEINE 50; 325; 40 MG/1; MG/1; MG/1
1 TABLET ORAL EVERY 4 HOURS PRN
Qty: 12 TABLET | Refills: 0 | Status: SHIPPED | OUTPATIENT
Start: 2025-04-08 | End: 2025-04-08

## 2025-04-08 NOTE — PROGRESS NOTES
"Subjective:      Patient ID: Annika Mac is a 74 y.o. female.    Vitals:  height is 5' 5" (1.651 m) and weight is 123.4 kg (272 lb). Her oral temperature is 98.3 °F (36.8 °C). Her blood pressure is 154/67 (abnormal) and her pulse is 64. Her respiration is 18 and oxygen saturation is 98%.     Chief Complaint: Headache    Pt reports standing from the toilet and hitting left side forehead on Saturday. Pt reports feeling dizzy when standing from the toilet and hitting head on a door. The window was open and the wind blew the door, which caused her to hit her head on it. Pt denies loss of consciousness, nausea, or vomiting. Pt reports continued dizziness when standing, some blurred vision, and headache since the injury.     Headache   This is a new problem. The current episode started in the past 7 days. The pain is at a severity of 7/10. Associated symptoms include blurred vision and dizziness. Pertinent negatives include no nausea or vomiting. Treatments tried: tylenol. The treatment provided mild relief.       Eyes:  Positive for blurred vision.   Gastrointestinal:  Negative for nausea and vomiting.   Neurological:  Positive for dizziness and headaches.      Objective:     Physical Exam   Constitutional: She is oriented to person, place, and time. She appears well-developed.   HENT:   Head: Normocephalic and atraumatic.   Ears:   Right Ear: External ear normal.   Left Ear: External ear normal.   Nose: Nose normal.   Mouth/Throat: Oropharynx is clear and moist.   Eyes: Conjunctivae, EOM and lids are normal.   Neck: Trachea normal and phonation normal. Neck supple.   Cardiovascular: Normal rate.   Pulmonary/Chest: Effort normal.   Musculoskeletal: Normal range of motion.         General: Normal range of motion.   Neurological: She is alert and oriented to person, place, and time.   Skin: Skin is warm, dry and intact.   Psychiatric: Her speech is normal and behavior is normal. Judgment and thought content normal. "   Nursing note and vitals reviewed.      Assessment:     1. Nonintractable headache, unspecified chronicity pattern, unspecified headache type        Plan:       Nonintractable headache, unspecified chronicity pattern, unspecified headache type  -     Discontinue: butalbital-acetaminophen-caffeine -40 mg (FIORICET, ESGIC) -40 mg per tablet; Take 1 tablet by mouth every 4 (four) hours as needed for Pain.  Dispense: 12 tablet; Refill: 0  -     butalbital-acetaminophen-caffeine -40 mg (FIORICET, ESGIC) -40 mg per tablet; Take 1 tablet by mouth every 4 (four) hours as needed for Pain.  Dispense: 12 tablet; Refill: 0

## 2025-04-09 ENCOUNTER — TELEPHONE (OUTPATIENT)
Dept: UROLOGY | Facility: CLINIC | Age: 75
End: 2025-04-09
Payer: MEDICARE

## 2025-04-09 ENCOUNTER — TELEPHONE (OUTPATIENT)
Dept: INTERNAL MEDICINE | Facility: CLINIC | Age: 75
End: 2025-04-09
Payer: MEDICARE

## 2025-04-09 NOTE — TELEPHONE ENCOUNTER
----- Message from Porsche sent at 4/9/2025  3:06 PM CDT -----  Type:  Appointment Request Name of Caller:Pt When is the first available appointment?No accessWould the patient rather a call back or a response via [facility]? Call Best Call Back Number:[phone] Additional Information: patient called to schedule her Encounter for Medicare annual wellness exam, pt is requesting to be seen on 4/24/25 between 8:40-9 due to her having appts at that location on that day. Please contact pt with further information.

## 2025-04-10 ENCOUNTER — TELEPHONE (OUTPATIENT)
Dept: INTERNAL MEDICINE | Facility: CLINIC | Age: 75
End: 2025-04-10
Payer: MEDICARE

## 2025-04-21 ENCOUNTER — APPOINTMENT (OUTPATIENT)
Dept: LAB | Facility: HOSPITAL | Age: 75
End: 2025-04-21
Attending: NURSE PRACTITIONER
Payer: MEDICARE

## 2025-04-24 ENCOUNTER — OFFICE VISIT (OUTPATIENT)
Dept: UROLOGY | Facility: CLINIC | Age: 75
End: 2025-04-24
Payer: MEDICARE

## 2025-04-24 VITALS
SYSTOLIC BLOOD PRESSURE: 130 MMHG | WEIGHT: 286.19 LBS | HEIGHT: 65 IN | DIASTOLIC BLOOD PRESSURE: 52 MMHG | HEART RATE: 65 BPM | BODY MASS INDEX: 47.68 KG/M2

## 2025-04-24 DIAGNOSIS — N28.89 LEFT RENAL MASS: ICD-10-CM

## 2025-04-24 PROCEDURE — 3078F DIAST BP <80 MM HG: CPT | Mod: CPTII,S$GLB,, | Performed by: STUDENT IN AN ORGANIZED HEALTH CARE EDUCATION/TRAINING PROGRAM

## 2025-04-24 PROCEDURE — 1126F AMNT PAIN NOTED NONE PRSNT: CPT | Mod: CPTII,S$GLB,, | Performed by: STUDENT IN AN ORGANIZED HEALTH CARE EDUCATION/TRAINING PROGRAM

## 2025-04-24 PROCEDURE — 1101F PT FALLS ASSESS-DOCD LE1/YR: CPT | Mod: CPTII,S$GLB,, | Performed by: STUDENT IN AN ORGANIZED HEALTH CARE EDUCATION/TRAINING PROGRAM

## 2025-04-24 PROCEDURE — 1159F MED LIST DOCD IN RCRD: CPT | Mod: CPTII,S$GLB,, | Performed by: STUDENT IN AN ORGANIZED HEALTH CARE EDUCATION/TRAINING PROGRAM

## 2025-04-24 PROCEDURE — 3288F FALL RISK ASSESSMENT DOCD: CPT | Mod: CPTII,S$GLB,, | Performed by: STUDENT IN AN ORGANIZED HEALTH CARE EDUCATION/TRAINING PROGRAM

## 2025-04-24 PROCEDURE — 99204 OFFICE O/P NEW MOD 45 MIN: CPT | Mod: S$GLB,,, | Performed by: STUDENT IN AN ORGANIZED HEALTH CARE EDUCATION/TRAINING PROGRAM

## 2025-04-24 PROCEDURE — 3075F SYST BP GE 130 - 139MM HG: CPT | Mod: CPTII,S$GLB,, | Performed by: STUDENT IN AN ORGANIZED HEALTH CARE EDUCATION/TRAINING PROGRAM

## 2025-04-24 PROCEDURE — 99999 PR PBB SHADOW E&M-EST. PATIENT-LVL IV: CPT | Mod: PBBFAC,,, | Performed by: STUDENT IN AN ORGANIZED HEALTH CARE EDUCATION/TRAINING PROGRAM

## 2025-04-24 NOTE — PROGRESS NOTES
[facility]  Urologic Oncology Clinic Note        Date of Service: 4/24/2025        Chief Complaint/Reason for Consultation: Renal mass    Requesting Provider:   Julia Torres, NP  [address]  [place]  [place] [address]      History of Present Illness:   Patient 74 y.o. female presents with renal mass. Initially seen on renal u/s in 2020. Workup with Dr. Rivas included retrograde pyelograms for further evaluation. Patient was lost to follow-up. Recent imaging done for RLQ on 7/1/2024 demonstrated bilateral renal cysts.           Urologic History:     1/20/2020 -- Renal u/s -- small solid mass in left kidney, 9mm  2/26/2020 -- Bilateral retrograde pyelogram -- no evidence of hydronephrosis, no filling defects, and ureter normal in caliber with medial deviation of the distal ureters bilaterally  7/1/2024 -- CT AP without contrast --  bilateral hypodensities compatible with cysts, largest on the right measuring 4.2 cm       Patient Active Problem List    Diagnosis Date Noted    Colitis 07/01/2024    Diarrhea 07/01/2024    Hemiplegia and hemiparesis following cerebral infarction affecting right dominant side 04/25/2024    Decreased functional mobility and endurance 01/26/2024    Gait abnormality 01/26/2024    Balance problem 01/26/2024    Decreased strength 01/26/2024    Pain 01/26/2024    Hair loss 11/30/2022    Dizziness 06/17/2022    Isolated proteinuria 01/21/2022    Polyneuropathy 01/14/2022    Hypertensive heart and chronic kidney disease with heart failure and stage 1 through stage 4 chronic kidney disease, or unspecified chronic kidney disease 01/14/2022    Irradiation cystitis with hematuria     Acute osteomyelitis     Dyslipidemia associated with type 2 diabetes mellitus 09/13/2021    Chronic pain 05/06/2021    DDD (degenerative disc disease), lumbar 04/28/2021    Chronic bilateral low back pain without sciatica 04/08/2021    Difficulty walking 04/08/2021    Pain of left thumb 02/08/2021     Decreased sensation 02/08/2021    Arthritis of carpometacarpal (CMC) joint of left thumb 01/08/2021    Asthma     Anemia associated with chronic renal failure 09/21/2020    Pain in both hands 07/09/2020    Left renal mass 02/25/2020    Unspecified inflammatory spondylopathy, lumbar region 01/22/2020    Constipation 10/03/2019    Hypoglycemia unawareness associated with type 2 diabetes mellitus 07/01/2019    Morbid obesity 05/24/2019    Anemia in chronic kidney disease (CKD) 04/12/2019    Stage 3b chronic kidney disease 02/13/2019    Ectatic aorta 02/13/2019    Elbow pain, left 07/11/2018    Secondary hyperparathyroidism 03/07/2018    Iron deficiency anemia 02/26/2018    Stage 4 chronic kidney disease 11/02/2017    Type 2 diabetes mellitus with both eyes affected by proliferative retinopathy and macular edema, with long-term current use of insulin 10/24/2017    Osteoarthritis of spine with radiculopathy, cervical region 08/04/2017    Radicular pain in left arm 08/04/2017    Cough syncope 01/27/2017    GERD (gastroesophageal reflux disease) 01/27/2017    Primary osteoarthritis of right knee 11/11/2016    Essential hypertension 10/27/2016    Lumbar spinal stenosis 12/11/2015    Diastolic dysfunction 07/31/2015    Peripheral autonomic neuropathy due to DM 02/16/2015    PCO (posterior capsular opacification) 01/08/2015    Cataract fragments in eye following cataract surgery 10/05/2014    Lumbar facet arthropathy 07/29/2014    Gout, chronic 09/24/2012    Epiretinal membrane 08/22/2012    Type 2 diabetes mellitus with diabetic polyneuropathy, with long-term current use of insulin 07/27/2012    RUFINA (obstructive sleep apnea) 07/27/2012    Type 2 diabetes mellitus with stage 3b chronic kidney disease and hypertension 07/27/2012    History of stroke 08/01/2003        Prescriptions Prior to Admission[1]  Review of patient's allergies indicates:   Allergen Reactions    Clindamycin Hives and Swelling    Iodinated contrast media  Rash        Past Medical History:   Diagnosis Date    Allergy     Angio-edema     Asthma     Chronic obstructive pulmonary disease, unspecified COPD type 2022    Constipation 10/03/2019    Deep vein thrombophlebitis of left leg 2012    Deep vein thrombosis     when she had a knee replacement    Diabetic foot ulcer with osteomyelitis     Encounter for blood transfusion     anemic     GERD (gastroesophageal reflux disease)     Obesity, diabetes, and hypertension syndrome 2019    Obstructive sleep apnea 2012    PAD (peripheral artery disease) 2013    Pressure ulcer of toe of left foot     Type 2 diabetes mellitus with obesity 2020    Type 2 diabetes mellitus with peripheral artery disease 2020      Past Surgical History:   Procedure Laterality Date    CATARACT EXTRACTION W/  INTRAOCULAR LENS IMPLANT Left 3/2002    left     CATARACT EXTRACTION W/  INTRAOCULAR LENS IMPLANT Right     OD dr. olivares     SECTION      COLONOSCOPY N/A 2018    Procedure: COLONOSCOPY;  Surgeon: Faizan Mac MD;  Location: [facility] (2ND FLR);  Service: Endoscopy;  Laterality: N/A;    COLONOSCOPY N/A 2023    Procedure: COLONOSCOPY;  Surgeon: Pa Zamora MD;  Location: [facility] (2ND FLR);  Service: Endoscopy;  Laterality: N/A;    CYST REMOVAL  2011    left side of face    CYSTOSCOPY W/ RETROGRADES Left 2020    Procedure: CYSTOSCOPY, WITH RETROGRADE PYELOGRAM;  Surgeon: Noman Rivas MD;  [address];  Service: Urology;  Laterality: Left;  30min    DE QUERVAIN'S RELEASE  2021    Procedure: RELEASE, HAND, FOR DEQUERVAIN'S TENOSYNOVITIS;  Surgeon: Marky Hagen MD;  Location: [facility];  Service: Orthopedics;;    ESOPHAGOGASTRODUODENOSCOPY N/A 2023    Procedure: EGD (ESOPHAGOGASTRODUODENOSCOPY);  Surgeon: Pa Zamora MD;  Location: [facility] (2ND FLR);  Service: Endoscopy;  Laterality: N/A;  suprep-inst mail-bmi  49.09-tb  4/20/23- No answer for precall- [place]    EYE SURGERY Bilateral 2012    eye implants    GANGLION CYST EXCISION  11/13/2007    Right wrist    INJECTION OF ANESTHETIC AGENT AROUND MEDIAL BRANCH NERVES INNERVATING LUMBAR FACET JOINT Bilateral 4/5/2022    Procedure: Block-nerve-medial branch-lumbar bilateral L4-5 and L5-S1;  Surgeon: Alireza Meraz Jr., MD;  Location: [facility]IN [facility]GT;  Service: Pain Management;  Laterality: Bilateral;  pt is diabetic   asa    INJECTION OF ANESTHETIC AGENT AROUND MEDIAL BRANCH NERVES INNERVATING LUMBAR FACET JOINT Bilateral 4/19/2022    Procedure: Block-nerve-medial branch-lumbar bilaterl L4-5 and L5-S1;  Surgeon: Alireza Meraz Jr., MD;  Location: [facility];  Service: Pain Management;  Laterality: Bilateral;  pt is diabetic     INJECTION OF FACET JOINT Bilateral 5/6/2021    Procedure: INJECTION, FACET JOINT--Bilateral L4-5, L5-S1;  Surgeon: Alireza Meraz Jr., MD;  Location: [facility];  Service: Pain Management;  Laterality: Bilateral;  Oral    INTERPOSITION ARTHROPLASTY OF CARPOMETACARPAL JOINTS Left 1/8/2021    Procedure: INTERPOSITION ARTHROPLASTY, CMC JOINT - left dequervains and cmc arthroplasty, arthrex;  Surgeon: Marky Hagen MD;  Location: [facility];  Service: Orthopedics;  Laterality: Left;    JOINT REPLACEMENT Left     Pan Retinal Photocoagulation Bilateral     Dr. Monterroso (Proliferative Diabetic Retinopathy)    RADIOFREQUENCY ABLATION OF LUMBAR MEDIAL BRANCH NERVE AT SINGLE LEVEL Bilateral 12/20/2023    Procedure: Radiofrequency Ablation, Nerve, Spinal, Lumbar, bilateral L4/5 L5/S1;  Surgeon: Alejandrina Roa DO;  Location: [facility];  Service: Pain Management;  Laterality: Bilateral;  diabetic  asa    RADIOFREQUENCY THERMOCOAGULATION Right 5/12/2022    Procedure: RADIOFREQUENCY THERMAL COAGULATION RIGHT L4-5, L5-S1 (IV Sedation);  Surgeon: Alireza Meraz Jr., MD;  Location: [facility];  Service: Pain Management;  Laterality: Right;   "diabetic    RADIOFREQUENCY THERMOCOAGULATION Left 5/19/2022    Procedure: RADIOFREQUENCY THERMAL COAGULATION LEFT L4-5, L5-S1 (IV Sedation);  Surgeon: Alireza Meraz Jr., MD;  Location: [facility];  Service: Pain Management;  Laterality: Left;  diabetic    TOTAL ABDOMINAL HYSTERECTOMY  1982    TOTAL KNEE ARTHROPLASTY  2/2006    left    TRIGGER FINGER RELEASE  9/18/2009      Family History   Problem Relation Name Age of Onset    Diabetes Mother      Hypertension Mother      Heart disease Father      Diabetes Sister X4     No Known Problems Sister unknown hx     No Known Problems Brother X2-unknown hx     Thyroid disease Brother X4     Diabetes Brother X4     Hypertension Brother X4     No Known Problems Daughter      No Known Problems Daughter      No Known Problems Son      Amblyopia Neg Hx      Blindness Neg Hx      Glaucoma Neg Hx      Macular degeneration Neg Hx      Retinal detachment Neg Hx      Strabismus Neg Hx      Colon cancer Neg Hx      Esophageal cancer Neg Hx        Social History     Tobacco Use    Smoking status: Never     Passive exposure: Past    Smokeless tobacco: Never   Substance Use Topics    Alcohol use: No        Review of Systems   Constitutional:  Negative for activity change and unexpected weight change.   HENT:  Negative for congestion.    Respiratory:  Negative for cough.    Genitourinary:  Negative for hematuria.             OBJECTIVE:     Vitals:    04/24/25 0957   BP: (!) 130/52   BP Location: Right arm   Patient Position: Sitting   Pulse: 65   Weight: 129.8 kg (286 lb 2.5 oz)   Height: 5' 5" (1.651 m)          General Appearance: Alert, cooperative, no distress  Head: Normocephalic  Eyes: Clear conjunctiva  Neck: No obvious LND or JVD  Lungs: Normal chest excursion, no accessory muscle use  Chest: Regular rate rhythm by palpation, no pedal edema  Abdomen: Soft, non-tender, non-distended  Female Genitalia: deferred  Extremities: Atraumatic   Lymph Nodes: No appreciable lymph " adenopathy  Neurologic: No gross gait, motor or sensory deficits          LAB:      All laboratory values listed below was/were independently reviewed with patient at this clinic visit.    Lab Results   Component Value Date    WBC 6.33 04/21/2025    HGB 9.6 (L) 04/21/2025    HCT 31.6 (L) 04/21/2025    MCV 90 04/21/2025     04/21/2025       CMP  Sodium   Date Value Ref Range Status   04/21/2025 141 136 - 145 mmol/L Final   01/25/2025 141 136 - 145 mmol/L Final     Potassium   Date Value Ref Range Status   04/21/2025 4.1 3.5 - 5.1 mmol/L Final   01/25/2025 4.2 3.5 - 5.1 mmol/L Final     Chloride   Date Value Ref Range Status   04/21/2025 107 95 - 110 mmol/L Final   01/25/2025 103 95 - 110 mmol/L Final     CO2   Date Value Ref Range Status   04/21/2025 24 23 - 29 mmol/L Final   01/25/2025 28 23 - 29 mmol/L Final     Glucose   Date Value Ref Range Status   01/25/2025 208 (H) 70 - 110 mg/dL Final     BUN   Date Value Ref Range Status   04/21/2025 33 (H) 8 - 23 mg/dL Final     Creatinine   Date Value Ref Range Status   04/21/2025 1.5 (H) 0.5 - 1.4 mg/dL Final     Calcium   Date Value Ref Range Status   04/21/2025 8.9 8.7 - 10.5 mg/dL Final   01/25/2025 9.9 8.7 - 10.5 mg/dL Final     Total Protein   Date Value Ref Range Status   11/04/2024 6.0 6.0 - 8.4 g/dL Final     Albumin   Date Value Ref Range Status   04/21/2025 3.3 (L) 3.5 - 5.2 g/dL Final   01/25/2025 3.5 3.5 - 5.2 g/dL Final     Total Bilirubin   Date Value Ref Range Status   11/04/2024 0.3 0.1 - 1.0 mg/dL Final     Comment:     For infants and newborns, interpretation of results should be based  on gestational age, weight and in agreement with clinical  observations.    Premature Infant recommended reference ranges:  Up to 24 hours.............<8.0 mg/dL  Up to 48 hours............<12.0 mg/dL  3-5 days..................<15.0 mg/dL  6-29 days.................<15.0 mg/dL       Alkaline Phosphatase   Date Value Ref Range Status   11/04/2024 95 40 - 150 U/L  Final     AST   Date Value Ref Range Status   11/04/2024 22 10 - 40 U/L Final     ALT   Date Value Ref Range Status   11/04/2024 13 10 - 44 U/L Final     Anion Gap   Date Value Ref Range Status   04/21/2025 10 8 - 16 mmol/L Final     eGFR   Date Value Ref Range Status   04/21/2025 36 (L) >60 mL/min/1.73/m2 Final     Comment:     Estimated GFR calculated using the CKD-EPI creatinine (2021) equation.   01/25/2025 31.1 (A) >60 mL/min/1.73 m^2 Final           IMAGING:      All imaging listed below was/were independently reviewed with patient at this clinic visit.    7/1/2024  CT ABDOMEN PELVIS WITHOUT CONTRAST     CLINICAL HISTORY:  Nausea/vomiting;RLQ abdominal pain (Age >= 14y);     TECHNIQUE:  Low dose axial images, sagittal and coronal reformations were obtained from the lung bases to the pubic symphysis.  Oral contrast was not administered.     COMPARISON:  CT 01/12/2020     FINDINGS:  Lung bases are well aerated.  No focal consolidation.  No significant pleural fluid.  Prominent calcification of mitral valve annulus.  Otherwise partially imaged heart is unremarkable.     Liver appears within normal limits for size.  No focal lesion noting limited noncontrast assessment.  No cholelithiasis.  No evidence acute cholecystitis.  No abnormal biliary duct dilatation.     Pancreas, spleen, bilateral adrenal glands appear within normal limits.     Bowel kidneys are normal in size and location.  Mild nonspecific bilateral perinephric stranding.  Bilateral hypodensities compatible with cysts, largest on the right measuring 4.2 cm.  No convincing renal calculi.  No hydronephrosis or hydroureter.     Bladder is mildly distended.  No focal wall thickening.     Uterus is surgically absent.  No adnexal mass.     Stomach and duodenum are unremarkable.  Normal caliber small bowel.  Mild soft tissue stranding about ascending colon near the hepatic flexure, component of this may be related to motion artifact.  Few prominent right  "lower quadrant mesenteric lymph nodes.  Appendix is not definitely visualized.  No evidence of bowel obstruction.  No free intraperitoneal air.  No significant ascites.     No suspicious retroperitoneal lymphadenopathy.     Abdominal aorta demonstrates normal caliber and course with moderate calcific atherosclerotic plaque.     Continued skin thickening within lower anterior abdominal wall, similar previous CT from 2020.     Degenerative changes throughout the spine.  Stable sclerosis within L3 vertebral body.  No acute fracture.  No aggressive osseous lesion.     Impression:     Subtle stranding about ascending colon near the hepatic flexure as above, could represent underlying colitis.  Recommend clinical correlation.  Few prominent right mesenteric lymph nodes, possibly reactive.     Additional findings as above.        Electronically signed by:John Goff  Date:                                            07/01/2024  Time:                                           13:41      ASSESSMENT/PLAN:     74 y.o F who presents with bilateral renal cysts.     Plan:    #Bilateral renal cysts  Today I discussed with the patient the incidence, risk factors, and natural history of renal cysts.     We reviewed with the patient her imaging findings suggestive of a  bilateral renal cyst(s) based on  , a CT without contrast, and Renal US. I informed the patient that based on the radiology read and my interpretation of the imaging findings that I think her renal cyst is simple and as a result we feel that no further surveillance imaging is needed.    We informed her that simple renal cysts are nothing more than "water balloons" and as far as we know carry no malignant potential. We did inform her that simple cysts can grow which is not a concern, and rarely even when they grow cause pain or other problems.    In brief, we mentioned and discussed the Bosniak cyst classification system. Then we discussed that in studies in even " Bosniak 2F cysts rarely progress to Bosniak III or IV cysts and then when operated very rarely carry malignant potential (PMID [id number] Mirza et al). When it comes to Bosniak 3 cysts we told the patient that the literature would suggest that only 50% do these carry malignant potential when operated on (PMID [id number] Blake et al).       At this point, through shared decision making we have decided to forego further surveillance.  Discussed that no renal masses were noted on recent CT w/o contrast.      Would like me to call Ravi her daughter          Britton Evans MD  Urologic Oncology  P: [phone]             [1] (Not in a hospital admission)

## 2025-05-01 DIAGNOSIS — E11.9 TYPE 2 DIABETES MELLITUS WITHOUT COMPLICATION: ICD-10-CM

## 2025-05-02 ENCOUNTER — TELEPHONE (OUTPATIENT)
Dept: INTERNAL MEDICINE | Facility: CLINIC | Age: 75
End: 2025-05-02
Payer: MEDICARE

## 2025-05-02 NOTE — TELEPHONE ENCOUNTER
Spoke with pt. Pt states she need order for rollator walker heavy duty foldable. Pt states to state on order walker needs to be capable to her weight of 298. Pt requesting walker to be for pt 300 plus pounds. [place] fax to [place]

## 2025-05-02 NOTE — TELEPHONE ENCOUNTER
----- Message from Milton sent at 5/2/2025 11:58 AM CDT -----  Regarding: Rotary Walker Heavy Duty - Callback Request  Contact: Pt [phone]  .1MEDICALADVICE Patient is calling for Medical Advice regarding: Patient needing a new rotatory walker, heavy duty. She said that she spoke to insurance they have her a fax that the order needs to be sent to. ([employer] Fax: [phone], Phone: [phone]). Please call patient to discuss this. How long has patient had these symptoms:Pharmacy name and phone#:[facility]: CallComments:Please advise patient replies from provider may take up to 48 hours.

## 2025-05-05 DIAGNOSIS — M51.362 DEGENERATION OF INTERVERTEBRAL DISC OF LUMBAR REGION WITH DISCOGENIC BACK PAIN AND LOWER EXTREMITY PAIN: Primary | ICD-10-CM

## 2025-05-05 DIAGNOSIS — I69.351 HEMIPLEGIA AND HEMIPARESIS FOLLOWING CEREBRAL INFARCTION AFFECTING RIGHT DOMINANT SIDE: ICD-10-CM

## 2025-05-05 RX ORDER — LABETALOL 300 MG/1
300 TABLET, FILM COATED ORAL 2 TIMES DAILY
Qty: 180 TABLET | Refills: 2 | Status: SHIPPED | OUTPATIENT
Start: 2025-05-05

## 2025-05-05 NOTE — TELEPHONE ENCOUNTER
Refill Routing Note   Medication(s) are not appropriate for processing by [facility] for the following reason(s):        Drug-disease interaction    [facility] action(s):  Defer     Requires labs : Yes      Medication Therapy Plan: Drug-Disease: labetaloL and Asthma; DEFER    Pharmacist review requested: Yes     Appointments  past 12m or future 3m with PCP    Date Provider   Last Visit   1/25/2025 Mamie Cotton MD   Next Visit   6/2/2025 Mamie Cotton MD   ED visits in past 90 days: 0        Note composed:11:29 AM 05/05/2025

## 2025-05-05 NOTE — TELEPHONE ENCOUNTER
Care Due:                  Date            Visit Type   Department     Provider  --------------------------------------------------------------------------------                                [facility] INTERNAL  Mamie Mora  Last Visit: 01-      CARE ([facility])   MEDICINE       [place]                              [facility] INTERNAL  Mamie Mora  Next Visit: 06-      CARE ([facility])   MEDICINE       Yury                                                            Last  Test          Frequency    Reason                     Performed    Due Date  --------------------------------------------------------------------------------    HBA1C.......  6 months...  empagliflozin............  11- 05-    Uric Acid...  12 months..  colchicine...............  Not Found    Overdue    [employer] Embedded Care Due Messages. Reference number: [id number].   5/05/2025 3:22:32 AM CDT

## 2025-05-05 NOTE — TELEPHONE ENCOUNTER
Refill Routing Note   Medication(s) are not appropriate for processing by [facility] for the following reason(s):        Drug-disease interaction    [facility] action(s):  Defer   Requires labs : Yes      Medication Therapy Plan: Asthma - No override by provider    Pharmacist review requested: Yes     Appointments  past 12m or future 3m with PCP    Date Provider   Last Visit   1/25/2025 Mamie Cotton MD   Next Visit   6/2/2025 Mamie Cotton MD   ED visits in past 90 days: 0        Note composed:2:32 PM 05/05/2025

## 2025-05-06 ENCOUNTER — TELEPHONE (OUTPATIENT)
Dept: INTERNAL MEDICINE | Facility: CLINIC | Age: 75
End: 2025-05-06
Payer: MEDICARE

## 2025-05-06 NOTE — TELEPHONE ENCOUNTER
----- Message from Irena sent at 5/6/2025  8:55 AM CDT -----  .1MEDICALADVICE Patient is calling for Medical Advice regarding: [employer] is calling in reference to an order for this pt getting a walker.  - [place]. Please call to adviseHow long has patient had these symptoms:Pharmacy name and phone#:[facility] call back [facility]:Comments:Please advise patient replies from provider may take up to 48 hours.

## 2025-05-06 NOTE — TELEPHONE ENCOUNTER
[facility] called and states pt order for walker need the weight updated. The weight is documented as her BMI.

## 2025-05-08 ENCOUNTER — OFFICE VISIT (OUTPATIENT)
Dept: PODIATRY | Facility: CLINIC | Age: 75
End: 2025-05-08
Payer: MEDICARE

## 2025-05-08 VITALS
BODY MASS INDEX: 47.38 KG/M2 | WEIGHT: 284.38 LBS | SYSTOLIC BLOOD PRESSURE: 128 MMHG | RESPIRATION RATE: 18 BRPM | DIASTOLIC BLOOD PRESSURE: 70 MMHG | HEIGHT: 65 IN | HEART RATE: 68 BPM

## 2025-05-08 DIAGNOSIS — E11.42 TYPE 2 DIABETES MELLITUS WITH DIABETIC POLYNEUROPATHY, WITH LONG-TERM CURRENT USE OF INSULIN: ICD-10-CM

## 2025-05-08 DIAGNOSIS — M25.571 CHRONIC PAIN OF RIGHT ANKLE: ICD-10-CM

## 2025-05-08 DIAGNOSIS — N18.32 TYPE 2 DIABETES MELLITUS WITH STAGE 3B CHRONIC KIDNEY DISEASE AND HYPERTENSION: ICD-10-CM

## 2025-05-08 DIAGNOSIS — E11.22 TYPE 2 DIABETES MELLITUS WITH STAGE 3B CHRONIC KIDNEY DISEASE AND HYPERTENSION: ICD-10-CM

## 2025-05-08 DIAGNOSIS — Z79.4 TYPE 2 DIABETES MELLITUS WITH DIABETIC POLYNEUROPATHY, WITH LONG-TERM CURRENT USE OF INSULIN: ICD-10-CM

## 2025-05-08 DIAGNOSIS — N18.32 STAGE 3B CHRONIC KIDNEY DISEASE: ICD-10-CM

## 2025-05-08 DIAGNOSIS — B35.1 ONYCHOMYCOSIS DUE TO DERMATOPHYTE: ICD-10-CM

## 2025-05-08 DIAGNOSIS — G89.29 CHRONIC PAIN OF RIGHT ANKLE: ICD-10-CM

## 2025-05-08 DIAGNOSIS — I12.9 TYPE 2 DIABETES MELLITUS WITH STAGE 3B CHRONIC KIDNEY DISEASE AND HYPERTENSION: ICD-10-CM

## 2025-05-08 DIAGNOSIS — E11.42 DIABETIC POLYNEUROPATHY ASSOCIATED WITH TYPE 2 DIABETES MELLITUS: Primary | ICD-10-CM

## 2025-05-08 PROCEDURE — 3074F SYST BP LT 130 MM HG: CPT | Mod: CPTII,S$GLB,, | Performed by: PODIATRIST

## 2025-05-08 PROCEDURE — 99999 PR PBB SHADOW E&M-EST. PATIENT-LVL IV: CPT | Mod: PBBFAC,,, | Performed by: PODIATRIST

## 2025-05-08 PROCEDURE — 99213 OFFICE O/P EST LOW 20 MIN: CPT | Mod: 25,S$GLB,, | Performed by: PODIATRIST

## 2025-05-08 PROCEDURE — 11721 DEBRIDE NAIL 6 OR MORE: CPT | Mod: XS,Q9,S$GLB, | Performed by: PODIATRIST

## 2025-05-08 PROCEDURE — 3078F DIAST BP <80 MM HG: CPT | Mod: CPTII,S$GLB,, | Performed by: PODIATRIST

## 2025-05-08 PROCEDURE — 3044F HG A1C LEVEL LT 7.0%: CPT | Mod: CPTII,S$GLB,, | Performed by: PODIATRIST

## 2025-05-08 PROCEDURE — 1125F AMNT PAIN NOTED PAIN PRSNT: CPT | Mod: CPTII,S$GLB,, | Performed by: PODIATRIST

## 2025-05-08 PROCEDURE — 11057 PARNG/CUTG B9 HYPRKR LES >4: CPT | Mod: Q9,S$GLB,, | Performed by: PODIATRIST

## 2025-05-08 RX ORDER — INSULIN LISPRO 100 [IU]/ML
INJECTION, SOLUTION INTRAVENOUS; SUBCUTANEOUS
Qty: 60 ML | Refills: 3 | Status: SHIPPED | OUTPATIENT
Start: 2025-05-08

## 2025-05-08 RX ORDER — INSULIN DEGLUDEC 100 U/ML
45 INJECTION, SOLUTION SUBCUTANEOUS NIGHTLY
Qty: 45 ML | Refills: 3 | Status: SHIPPED | OUTPATIENT
Start: 2025-05-08

## 2025-05-08 RX ORDER — PREGABALIN 150 MG/1
150 CAPSULE ORAL 2 TIMES DAILY
Qty: 180 CAPSULE | Refills: 3 | Status: SHIPPED | OUTPATIENT
Start: 2025-05-08

## 2025-05-08 NOTE — PROGRESS NOTES
Subjective:      Patient ID: Annika Mac is a 74 y.o. female.    Chief Complaint: Diabetic Foot Exam (Mamie Cotton MD 1/25/25), Ankle Pain (Rt ankle ), and Nail Care (Long thick nail )      Annika is a 74 y.o. female who presents to the clinic for evaluation and treatment of high risk feet. Annika has a past medical history of Allergy, Angio-edema, Asthma, Chronic obstructive pulmonary disease, unspecified COPD type (01/14/2022), Constipation (10/03/2019), Deep vein thrombophlebitis of left leg (07/27/2012), Deep vein thrombosis, Diabetic foot ulcer with osteomyelitis, Encounter for blood transfusion, GERD (gastroesophageal reflux disease), Obesity, diabetes, and hypertension syndrome (02/13/2019), Obstructive sleep apnea (07/27/2012), PAD (peripheral artery disease) (11/21/2013), Pressure ulcer of toe of left foot, Type 2 diabetes mellitus with obesity (08/19/2020), and Type 2 diabetes mellitus with peripheral artery disease (08/19/2020). The patient's chief complaint is long, thick toenails. This patient has documented high risk feet requiring routine maintenance secondary to peripheral neuropathy.    PCP: Mamie Cotton MD    Date Last Seen by PCP:   Chief Complaint   Patient presents with    Diabetic Foot Exam     Mamie Cotton MD 1/25/25    Ankle Pain     Rt ankle     Nail Care     Long thick nail          Current shoe gear:  Affected Foot: Rx diabetic extra depth shoes and custom accommodative insoles     Unaffected Foot: Rx diabetic extra depth shoes and custom accommodative insoles    Hemoglobin A1C   Date Value Ref Range Status   11/04/2024 7.1 (H) 4.0 - 5.6 % Final     Comment:     ADA Screening Guidelines:  5.7-6.4%  Consistent with prediabetes  >or=6.5%  Consistent with diabetes    High levels of fetal hemoglobin interfere with the HbA1C  assay. Heterozygous hemoglobin variants (HbS, HgC, etc)do  not significantly interfere with this assay.   However, presence of  multiple variants may affect accuracy.     03/05/2024 7.0 (H) 4.0 - 5.6 % Final     Comment:     ADA Screening Guidelines:  5.7-6.4%  Consistent with prediabetes  >or=6.5%  Consistent with diabetes    High levels of fetal hemoglobin interfere with the HbA1C  assay. Heterozygous hemoglobin variants (HbS, HgC, etc)do  not significantly interfere with this assay.   However, presence of multiple variants may affect accuracy.     10/24/2023 7.5 (H) 4.0 - 5.6 % Final     Comment:     ADA Screening Guidelines:  5.7-6.4%  Consistent with prediabetes  >or=6.5%  Consistent with diabetes    High levels of fetal hemoglobin interfere with the HbA1C  assay. Heterozygous hemoglobin variants (HbS, HgC, etc)do  not significantly interfere with this assay.   However, presence of multiple variants may affect accuracy.       Hemoglobin A1c   Date Value Ref Range Status   06/02/2025 6.9 (H) 4.0 - 5.6 % Final     Comment:     ADA Screening Guidelines:  5.7-6.4%  Consistent with prediabetes  >=6.5%  Consistent with diabetes    High levels of fetal hemoglobin interfere with the HbA1C  assay. Heterozygous hemoglobin variants (HbS, HgC, etc)do  not significantly interfere with this assay.   However, presence of multiple variants may affect accuracy.       Review of Systems   Constitutional: Negative for chills, fever and malaise/fatigue.   HENT:  Negative for hearing loss.    Cardiovascular:  Positive for leg swelling. Negative for claudication.   Respiratory:  Negative for hemoptysis and shortness of breath.    Skin:  Positive for color change, dry skin and nail changes. Negative for flushing, itching and rash.   Musculoskeletal:  Negative for joint pain and myalgias.   Neurological:  Positive for numbness, paresthesias and sensory change. Negative for loss of balance.   Psychiatric/Behavioral:  Negative for altered mental status.            Objective:      Physical Exam  Vitals reviewed.   Constitutional:       Appearance: She is  well-developed.   Cardiovascular:      Pulses:           Dorsalis pedis pulses are 0 on the right side and 0 on the left side.        Posterior tibial pulses are 1+ on the right side and 1+ on the left side.   Musculoskeletal:      Right lower leg: Edema present.      Left lower leg: Edema present.      Right ankle: Decreased range of motion. Abnormal pulse.      Right Achilles Tendon: Patel's test negative.      Left ankle: Decreased range of motion. Abnormal pulse.      Left Achilles Tendon: Patel's test negative.      Right foot: Decreased range of motion.      Left foot: Decreased range of motion.   Feet:      Right foot:      Protective Sensation: 5 sites tested.  2 sites sensed.      Left foot:      Protective Sensation: 5 sites tested.  2 sites sensed.   Skin:     General: Skin is warm and dry.      Capillary Refill: Capillary refill takes more than 3 seconds.      Comments: Nails x10 are elongated by 4-5mm's, thickened by 2-4 mm's, dystrophic, and are yellowish in  coloration . Xerosis Bilaterally. No open lesions noted.   HKLs noted to b/L 1st met, distal 2nd and 3rd digits b/L   Neurological:      Mental Status: She is alert.      Comments: diminished sensation noted to b/L lower extremities   Psychiatric:         Behavior: Behavior normal. Behavior is cooperative.               Assessment:       Encounter Diagnoses   Name Primary?    Diabetic polyneuropathy associated with type 2 diabetes mellitus Yes    Onychomycosis due to dermatophyte     Chronic pain of right ankle          Plan:       Annika was seen today for diabetic foot exam, ankle pain and nail care.    Diagnoses and all orders for this visit:    Diabetic polyneuropathy associated with type 2 diabetes mellitus  -     DIABETIC SHOES FOR HOME USE    Onychomycosis due to dermatophyte  -     DIABETIC SHOES FOR HOME USE    Chronic pain of right ankle  -     HME - OTHER      I counseled the patient on her conditions, their implications and medical  management.      - Shoe inspection. Diabetic Foot Education. Patient reminded of the importance of good nutrition and blood sugar control to help prevent podiatric complications of diabetes. Patient instructed on proper foot hygeine. We discussed wearing proper shoe gear, daily foot inspections, never walking without protective shoe gear, never putting sharp instruments to feet, routine podiatric nail visits every 2-3 months.    After cleansing with an alcohol prep pad, the about mentioned hyperkeratotic lesions were sharply debrided X 6 utilizing a #15 blade to a smooth base without incident. Pt tolerated the procedure well and reported comfort to the debarment sites. Pt will continue to use padding and moisture the callused areas.     - With patient's permission, nails were aggressively reduced and debrided x 10 to their soft tissue attachment mechanically removing all offending nail and debris. Patient relates relief following the procedure. She will continue to monitor the areas daily, inspect her feet, wear protective shoe gear when ambulatory, moisturizer to maintain skin integrity and follow in this office in approximately 2-3 months, sooner p.r.n.    - Pt reports long hx of r ankle pain. Clinically tenderness along atfl and cfl minimal pain w/ rom . Patient fitted and dispensed Gauntlet style lace up and instructed on use  RICE

## 2025-05-11 NOTE — PROGRESS NOTES
"Subjective:       Patient ID: Annika Mac is a 66 y.o. female.    Chief Complaint: Diabetes  Annika Mac 66 y.o. female is here for office visit to review care and physical exam, here for usual care noting has been seeing ortho, reports had steroid shot recently.  Has CBgs written on lined paper.  Some levels ok, 90-120s, but rare.  Has a lot of CBgs 400-even one > 500 not related to steroid.  Note last DM visit was with Endocrin, advice was: T2DM: uncontrolled, with neuropathy, retinopathy, nephropathy.   Change U-500 to 17- 13- 4 unit marks.   Continue Januvia 50mg QAM."  Then asked to "return in about three months."  Apparently has very inconsistent diet but that is not charted. Saw nephrology recently for CKD, likely 2nd to uncontrolled DM and HTN.        HPI  Review of Systems   Constitutional: Negative for activity change, fatigue, fever and unexpected weight change.   HENT: Negative for congestion, hearing loss, postnasal drip and rhinorrhea.    Eyes: Negative for redness and visual disturbance.   Respiratory: Negative for chest tightness, shortness of breath and wheezing.    Cardiovascular: Negative for chest pain, palpitations and leg swelling.   Gastrointestinal: Negative for abdominal distention.   Genitourinary: Negative for decreased urine volume, dysuria, flank pain, hematuria, pelvic pain and urgency.   Musculoskeletal: Negative for back pain, gait problem, joint swelling and neck stiffness.   Skin: Negative for color change, rash and wound.   Neurological: Negative for dizziness, syncope, weakness and headaches.   Psychiatric/Behavioral: Negative for behavioral problems, confusion and sleep disturbance. The patient is not nervous/anxious.        Objective:      Physical Exam   Constitutional: She is oriented to person, place, and time. She appears well-developed and well-nourished. No distress.   HENT:   Head: Normocephalic.   Mouth/Throat: No oropharyngeal exudate.   Eyes: EOM are normal. Pupils " Resolved     are equal, round, and reactive to light. No scleral icterus.   Neck: Neck supple. No JVD present. No thyromegaly present.   Cardiovascular: Normal rate, regular rhythm and normal heart sounds.  Exam reveals no gallop and no friction rub.    No murmur heard.  Pulmonary/Chest: Effort normal and breath sounds normal. She has no wheezes. She has no rales.   Abdominal: Soft. Bowel sounds are normal. She exhibits no distension and no mass. There is no tenderness. There is no guarding.   Musculoskeletal: Normal range of motion. She exhibits no edema.   Lymphadenopathy:     She has no cervical adenopathy.   Neurological: She is alert and oriented to person, place, and time. She has normal reflexes. She displays normal reflexes. No cranial nerve deficit. She exhibits normal muscle tone.   Skin: Skin is warm. No rash noted. No erythema.   Psychiatric: She has a normal mood and affect. Thought content normal.       Assessment:       No diagnosis found.    Plan:       Annika was seen today for diabetes.    Diagnoses and all orders for this visit:    Type 2 diabetes mellitus with diabetic polyneuropathy, with long-term current use of insulin  - Discussed dietary impact on CBGs and why inconsistent scheduled po and variance in glycemic index foods (explained in laymen's terms) can cause CBgs to fluctuate so much.  Advised to rtc w/o appt one week to review charted CBgs and diet  Dyslipidemia   - diet discussed  CKD (chronic kidney disease) stage 3, GFR 30-59 ml/min  - chart reviewed

## 2025-05-17 DIAGNOSIS — I10 ESSENTIAL HYPERTENSION: ICD-10-CM

## 2025-05-17 DIAGNOSIS — E11.42 TYPE 2 DIABETES MELLITUS WITH DIABETIC POLYNEUROPATHY, UNSPECIFIED WHETHER LONG TERM INSULIN USE: ICD-10-CM

## 2025-05-17 NOTE — TELEPHONE ENCOUNTER
No care due was identified.  [facility] Embedded Care Due Messages. Reference number: [id number].   5/17/2025 6:37:06 PM CDT

## 2025-05-18 RX ORDER — IRBESARTAN 300 MG/1
300 TABLET ORAL NIGHTLY
Qty: 90 TABLET | Refills: 3 | OUTPATIENT
Start: 2025-05-18

## 2025-05-18 NOTE — TELEPHONE ENCOUNTER
Refill Decision Note   Annika Mac  is requesting a refill authorization.  Brief Assessment and Rationale for Refill:  Quick Discontinue     Medication Therapy Plan:  discontinued 10/25/2024 by Mamie Cotton MD;Reason: Side effects      Comments:     Note composed:5:02 PM 05/18/2025

## 2025-05-19 RX ORDER — BLOOD-GLUCOSE,RECEIVER,CONT
EACH MISCELLANEOUS
Qty: 1 EACH | Refills: 0 | Status: SHIPPED | OUTPATIENT
Start: 2025-05-19

## 2025-05-24 NOTE — PROGRESS NOTES
FOLLOW-UP VISIT    Subjective:      Chief Complaint: Follow-up for diabetes and obesity    HPI: Annika Mac is a 74 y.o. female who is here for a follow-up evaluation for type 2 diabetes    The patient's last visit with Dr. Love was on 3/5/2024.  No medication changes made at last visit.    Past Medical History:   Diagnosis Date    Allergy     Angio-edema     Asthma     Chronic obstructive pulmonary disease, unspecified COPD type 01/14/2022    Constipation 10/03/2019    Deep vein thrombophlebitis of left leg 07/27/2012    Deep vein thrombosis     when she had a knee replacement    Diabetic foot ulcer with osteomyelitis     Encounter for blood transfusion     anemic     GERD (gastroesophageal reflux disease)     Obesity, diabetes, and hypertension syndrome 02/13/2019    Obstructive sleep apnea 07/27/2012    PAD (peripheral artery disease) 11/21/2013    Pressure ulcer of toe of left foot     Type 2 diabetes mellitus with obesity 08/19/2020    Type 2 diabetes mellitus with peripheral artery disease 08/19/2020     With regards to the diabetes, obesity:    She is been having issues with worsening neuropathic pain at last visit increased Cymbalta and continued Lyrica.     Diabetes history:  Diagnosed: 1987.   Started oral agents then NPH and Regular insulin.   Converted to MDI with Lantus and Novolog in 2/2006 after knee surgery.   D/c TZD due to weight gain 7/08 and added Symlin. Stopped Symlin 2/09.   Januvia added 2/10. D/C Januvia 12/10 r/t cost; resumed though pt assistance in 20.  Currently insurances to [employer] and is currently not paying anything for her insulin or Ozempic.  Patient currently off Ozempic and on Jardiance.  Dose not recall why it was switched.     Last seen with opthalmology 3/5/2024 and has upcomming appointment at the end of the month    Known complications:  Peripheral neuropathy - Last seen with podiatry 5/8/2025  Nephropathy - Last seen with nephrology 1/2025  Toe ulcer with  osteomyelitis  Hypoglycemia       Current Regimen:  Tresiba 45 units daily  Humalog 13 units with breakfast and lunch + moderate SSI  Trulicity 4.5 mg weekly  Jardiance 10 mg daily    Patient not taking Humalog at lunch    Other agents tried:  NPH/R  U-500 insulin  Glargine, detemir, aspart  Januvia  Roe  Actos    Hypoglycemia:  See above  Knows how to correct with 15 grams of carbs- juice, coke, or glucose tablets.     Denies missed doses.  Has Medicare Extra Help     Eats 3 meals daily, + snacks  Breakfast:  Grits/eggs/sausage georgie  Lunch:  Mashed potatoes, baked fish, corn, salad  Dinner:  Half cup rice, beans, baked chicken and greens.  Drinks:  Water, unsweet tea; Gatorade zero.    No formal exercise. Doing some exercises while seated.      Last visit with Diabetes Educator: : 10/06/2023    She is monitoring her blood glucose by Dexcom G7.  Data over the previous two weeks were downloaded and reviewed.          Diabetes Management Status    Statin: Taking - Atorvastatin 40 mg daily  ACE/ARB: Not Taking    Screening or Prevention Patient's value Goal Complete/Controlled?   HgA1C Testing and Control   Lab Results   Component Value Date    HGBA1C 6.9 (H) 06/02/2025      Annually/Less than 8% No   Lipid profile : 11/04/2024 Annually Yes   LDL control Lab Results   Component Value Date    LDLCALC 73.0 11/04/2024    Annually/Less than 100 mg/dl  Yes   Nephropathy screening Lab Results   Component Value Date    LABMICR 30.0 06/02/2025     Lab Results   Component Value Date    PROTEINUA Negative 04/21/2025     Lab Results   Component Value Date    UTPCR 0.14 04/21/2025      Annually Yes   Blood pressure BP Readings from Last 1 Encounters:   06/10/25 139/61    Less than 140/90 Yes   Dilated retinal exam : 03/05/2024 - Dr. Crook Annually Yes   Foot exam   : 10/30/2024 Annually Yes     Lab Results   Component Value Date    MICALBCREAT 30.6 (H) 06/02/2025    MICALBCREAT 43.5 (H) 04/24/2024    MICALBCREAT 35.3 (H)  01/05/2023    MICALBCREAT 76.9 (H) 04/11/2022    MICALBCREAT 270.1 (H) 01/07/2022     ROS:  as above    Objective:     Vitals:    06/10/25 0852   BP: 139/61   Pulse: 73       BP Readings from Last 5 Encounters:   06/10/25 139/61   06/04/25 130/66   06/02/25 126/67   05/08/25 128/70   04/24/25 (!) 130/52       Physical Exam  Constitutional:       Appearance: She is well-developed.   HENT:      Head: Normocephalic.   Eyes:      Conjunctiva/sclera: Conjunctivae normal.   Pulmonary:      Effort: Pulmonary effort is normal.   Musculoskeletal:         General: Normal range of motion.   Skin:     General: Skin is warm.   Neurological:      Mental Status: She is alert and oriented to person, place, and time.         Injection sites are without edema or erythema. No lipo hypertropthy or atrophy.      Wt Readings from Last 50 Encounters:   06/10/25 129.4 kg (285 lb 4.4 oz)   06/04/25 130.4 kg (287 lb 7.7 oz)   06/02/25 130.2 kg (287 lb 0.6 oz)   05/08/25 129 kg (284 lb 6.3 oz)   04/24/25 129.8 kg (286 lb 2.5 oz)   04/08/25 123.4 kg (272 lb)   01/30/25 128 kg (282 lb 3 oz)   01/25/25 128.3 kg (282 lb 13.6 oz)   01/25/25 130.3 kg (287 lb 4.2 oz)   11/05/24 125.5 kg (276 lb 10.8 oz)   11/04/24 127.5 kg (281 lb)   11/04/24 132.3 kg (291 lb 10.7 oz)   10/25/24 134.8 kg (297 lb 2.9 oz)   09/25/24 132.9 kg (292 lb 15.9 oz)   08/29/24 131.1 kg (289 lb)   08/20/24 131.1 kg (289 lb 0.4 oz)   08/14/24 132.7 kg (292 lb 8.8 oz)   08/12/24 132.7 kg (292 lb 8.8 oz)   07/24/24 132.9 kg (293 lb)   07/19/24 133.3 kg (293 lb 15.7 oz)   07/19/24 132.8 kg (292 lb 12.3 oz)   07/15/24 127 kg (280 lb)   07/12/24 (!) 136.4 kg (300 lb 11.3 oz)   07/01/24 130.6 kg (287 lb 14.7 oz)   06/26/24 (!) 136.4 kg (300 lb 11.3 oz)   06/20/24 135.4 kg (298 lb 8.1 oz)   05/23/24 135.6 kg (298 lb 15.1 oz)   04/24/24 (!) 136.4 kg (300 lb 11.3 oz)   04/24/24 (!) 136.4 kg (300 lb 11.3 oz)   04/04/24 135.4 kg (298 lb 8.1 oz)   03/05/24 134.7 kg (296 lb 14.1 oz)    01/08/24 132.3 kg (291 lb 11.4 oz)   01/04/24 132.2 kg (291 lb 8.9 oz)   12/07/23 130.2 kg (287 lb)   12/06/23 131.1 kg (289 lb)   11/27/23 131.2 kg (289 lb 3.9 oz)   11/20/23 130.9 kg (288 lb 9.3 oz)   11/14/23 130.7 kg (288 lb 2.3 oz)   10/24/23 130.7 kg (288 lb 2.3 oz)   10/24/23 130.7 kg (288 lb 4 oz)   09/29/23 130.9 kg (288 lb 9.3 oz)   09/21/23 130.9 kg (288 lb 9.3 oz)   07/27/23 132.3 kg (291 lb 10.7 oz)   06/29/23 131 kg (288 lb 12.8 oz)   06/15/23 131 kg (288 lb 12.8 oz)   06/07/23 130.9 kg (288 lb 9.3 oz)   06/07/23 131 kg (288 lb 12.8 oz)   05/16/23 132 kg (291 lb 0.1 oz)   04/26/23 132 kg (291 lb)   04/20/23 135 kg (297 lb 9.9 oz)       Lab Results   Component Value Date    HGBA1C 6.9 (H) 06/02/2025     Lab Results   Component Value Date    CHOL 140 11/04/2024    HDL 38 (L) 11/04/2024    LDLCALC 73.0 11/04/2024    TRIG 145 11/04/2024    CHOLHDL 27.1 11/04/2024     Lab Results   Component Value Date     04/21/2025    K 4.1 04/21/2025     04/21/2025    CO2 24 04/21/2025     (H) 04/21/2025    BUN 33 (H) 04/21/2025    CREATININE 1.5 (H) 04/21/2025    CALCIUM 8.9 04/21/2025    PROT 6.0 11/04/2024    ALBUMIN 3.3 (L) 04/21/2025    BILITOT 0.3 11/04/2024    ALKPHOS 95 11/04/2024    AST 22 11/04/2024    ALT 13 11/04/2024    ANIONGAP 10 04/21/2025    ESTGFRAFRICA 29.9 (A) 06/13/2022    EGFRNONAA 26.0 (A) 06/13/2022    TSH 1.365 09/08/2020      Lab Results   Component Value Date    MICALBCREAT 30.6 (H) 06/02/2025       Assessment/Plan:     Type 2 diabetes mellitus with stage 3b chronic kidney disease and hypertension  -- Reviewed goals of therapy are to get the best control we can without hypoglycemia  -- Labs prior to follow up.  -- A1c at goal <7.5%.  -- Reviewed logs/CGM:  Dexcom report reivewed with good TIR of 71%, <2% low, GMI 7.1%, and average .  Post prandial hyperglycemia followed by hypoglycemia suspected to be caused by late mealtime insulin administration.  Reviewed [place]  mealtime insulin administration and referred to diabetes education.  Plan to transition from Trulicity to Ozempic for better weight loss.   -- Reviewed patient's current insulin regimen. Clarified proper insulin dose and timing in relation to meals, etc. Insulin injection sites and proper rotation instructed.    -- Advised frequent self blood glucose monitoring.  Patient encouraged to document glucose results and bring them to every clinic visit    -- Hypoglycemia precautions discussed.  -- Call for Bg repeatedly < 70 or > 200.   -- Close adherence to lifestyle changes recommended.   -- Periodic follow ups for eye evaluations, foot care and dental care suggested.  -- Encouraged exercise per ADA guidelines.    -- Encouraged dietary modifications including but not limited to DM diet, low carb snacks, marrying carbohydrates with proteins.  Medication Regimen:   Tresiba 45 units daily  Humalog 13 units with breakfast and lunch + moderate SSI  Start Ozempic 1 mg weekly (36 clicks)  Jardiance 10 mg daily    Type 2 diabetes mellitus with both eyes affected by proliferative retinopathy and macular edema, with long-term current use of insulin  Following with Dr. Crook    Hypoglycemia unawareness associated with type 2 diabetes mellitus  On Dexcom G7.  Rare hypoglycemia noted over the previous two weeks.    Anemia in chronic kidney disease (CKD)  May artificially lower her A1c.    Dyslipidemia associated with type 2 diabetes mellitus  LDL at goal  Continue on statin per ADA guidelines.    Secondary hyperparathyroidism  Secondary to CKD.    Followed by Nephrology last seen 1/2025.    Lisa Lier, FNP-C [facility]     Case discussed with Dr. Waite  Recommendations were discussed with the patient in detail  The patient verbalized understanding and agrees with the plan outlined as above.     Visit today included increased complexity associated with the care of the problems addressed and managing the longitudinal care  of the patient due to the serious and/or complex managed problems.

## 2025-05-28 DIAGNOSIS — E11.9 TYPE 2 DIABETES MELLITUS WITHOUT COMPLICATION: ICD-10-CM

## 2025-06-02 ENCOUNTER — OFFICE VISIT (OUTPATIENT)
Dept: INTERNAL MEDICINE | Facility: CLINIC | Age: 75
End: 2025-06-02
Payer: MEDICARE

## 2025-06-02 ENCOUNTER — LAB VISIT (OUTPATIENT)
Dept: LAB | Facility: HOSPITAL | Age: 75
End: 2025-06-02
Attending: INTERNAL MEDICINE
Payer: MEDICARE

## 2025-06-02 VITALS
SYSTOLIC BLOOD PRESSURE: 126 MMHG | WEIGHT: 287.06 LBS | HEART RATE: 73 BPM | BODY MASS INDEX: 47.83 KG/M2 | DIASTOLIC BLOOD PRESSURE: 67 MMHG | HEIGHT: 65 IN | OXYGEN SATURATION: 99 %

## 2025-06-02 DIAGNOSIS — E11.42 TYPE 2 DIABETES MELLITUS WITH DIABETIC POLYNEUROPATHY, WITH LONG-TERM CURRENT USE OF INSULIN: ICD-10-CM

## 2025-06-02 DIAGNOSIS — Z79.4 TYPE 2 DIABETES MELLITUS WITH DIABETIC POLYNEUROPATHY, WITH LONG-TERM CURRENT USE OF INSULIN: Primary | ICD-10-CM

## 2025-06-02 DIAGNOSIS — E11.69 DYSLIPIDEMIA ASSOCIATED WITH TYPE 2 DIABETES MELLITUS: ICD-10-CM

## 2025-06-02 DIAGNOSIS — Z79.4 TYPE 2 DIABETES MELLITUS WITH DIABETIC POLYNEUROPATHY, WITH LONG-TERM CURRENT USE OF INSULIN: ICD-10-CM

## 2025-06-02 DIAGNOSIS — Z86.73 HISTORY OF STROKE: ICD-10-CM

## 2025-06-02 DIAGNOSIS — E11.42 TYPE 2 DIABETES MELLITUS WITH DIABETIC POLYNEUROPATHY, WITH LONG-TERM CURRENT USE OF INSULIN: Primary | ICD-10-CM

## 2025-06-02 DIAGNOSIS — Z79.4 TYPE 2 DIABETES MELLITUS WITH BOTH EYES AFFECTED BY PROLIFERATIVE RETINOPATHY AND MACULAR EDEMA, WITH LONG-TERM CURRENT USE OF INSULIN: ICD-10-CM

## 2025-06-02 DIAGNOSIS — G56.03 BILATERAL CARPAL TUNNEL SYNDROME: ICD-10-CM

## 2025-06-02 DIAGNOSIS — I10 PRIMARY HYPERTENSION: ICD-10-CM

## 2025-06-02 DIAGNOSIS — E11.3513 TYPE 2 DIABETES MELLITUS WITH BOTH EYES AFFECTED BY PROLIFERATIVE RETINOPATHY AND MACULAR EDEMA, WITH LONG-TERM CURRENT USE OF INSULIN: ICD-10-CM

## 2025-06-02 DIAGNOSIS — E78.5 DYSLIPIDEMIA ASSOCIATED WITH TYPE 2 DIABETES MELLITUS: ICD-10-CM

## 2025-06-02 DIAGNOSIS — N18.4 STAGE 4 CHRONIC KIDNEY DISEASE: ICD-10-CM

## 2025-06-02 DIAGNOSIS — I10 ESSENTIAL HYPERTENSION: ICD-10-CM

## 2025-06-02 LAB
EAG (OHS): 151 MG/DL (ref 68–131)
HBA1C MFR BLD: 6.9 % (ref 4–5.6)

## 2025-06-02 PROCEDURE — G2211 COMPLEX E/M VISIT ADD ON: HCPCS | Mod: S$GLB,,, | Performed by: INTERNAL MEDICINE

## 2025-06-02 PROCEDURE — 3078F DIAST BP <80 MM HG: CPT | Mod: CPTII,S$GLB,, | Performed by: INTERNAL MEDICINE

## 2025-06-02 PROCEDURE — 1159F MED LIST DOCD IN RCRD: CPT | Mod: CPTII,S$GLB,, | Performed by: INTERNAL MEDICINE

## 2025-06-02 PROCEDURE — 1160F RVW MEDS BY RX/DR IN RCRD: CPT | Mod: CPTII,S$GLB,, | Performed by: INTERNAL MEDICINE

## 2025-06-02 PROCEDURE — 3074F SYST BP LT 130 MM HG: CPT | Mod: CPTII,S$GLB,, | Performed by: INTERNAL MEDICINE

## 2025-06-02 PROCEDURE — 36415 COLL VENOUS BLD VENIPUNCTURE: CPT

## 2025-06-02 PROCEDURE — 99214 OFFICE O/P EST MOD 30 MIN: CPT | Mod: S$GLB,,, | Performed by: INTERNAL MEDICINE

## 2025-06-02 PROCEDURE — 83036 HEMOGLOBIN GLYCOSYLATED A1C: CPT

## 2025-06-02 PROCEDURE — 99999 PR PBB SHADOW E&M-EST. PATIENT-LVL V: CPT | Mod: PBBFAC,,, | Performed by: INTERNAL MEDICINE

## 2025-06-02 PROCEDURE — 1101F PT FALLS ASSESS-DOCD LE1/YR: CPT | Mod: CPTII,S$GLB,, | Performed by: INTERNAL MEDICINE

## 2025-06-02 PROCEDURE — 1125F AMNT PAIN NOTED PAIN PRSNT: CPT | Mod: CPTII,S$GLB,, | Performed by: INTERNAL MEDICINE

## 2025-06-02 PROCEDURE — 3288F FALL RISK ASSESSMENT DOCD: CPT | Mod: CPTII,S$GLB,, | Performed by: INTERNAL MEDICINE

## 2025-06-02 RX ORDER — ATORVASTATIN CALCIUM 40 MG/1
40 TABLET, FILM COATED ORAL NIGHTLY
Qty: 90 TABLET | Refills: 3 | Status: SHIPPED | OUTPATIENT
Start: 2025-06-02 | End: 2026-06-02

## 2025-06-03 ENCOUNTER — RESULTS FOLLOW-UP (OUTPATIENT)
Dept: INTERNAL MEDICINE | Facility: CLINIC | Age: 75
End: 2025-06-03

## 2025-06-03 ENCOUNTER — TELEPHONE (OUTPATIENT)
Dept: INTERNAL MEDICINE | Facility: CLINIC | Age: 75
End: 2025-06-03
Payer: MEDICARE

## 2025-06-04 ENCOUNTER — OFFICE VISIT (OUTPATIENT)
Dept: INTERNAL MEDICINE | Facility: CLINIC | Age: 75
End: 2025-06-04
Payer: MEDICARE

## 2025-06-04 VITALS
DIASTOLIC BLOOD PRESSURE: 66 MMHG | WEIGHT: 287.5 LBS | HEART RATE: 80 BPM | BODY MASS INDEX: 47.9 KG/M2 | HEIGHT: 65 IN | OXYGEN SATURATION: 95 % | SYSTOLIC BLOOD PRESSURE: 130 MMHG

## 2025-06-04 DIAGNOSIS — E11.43 PERIPHERAL AUTONOMIC NEUROPATHY DUE TO DM: ICD-10-CM

## 2025-06-04 DIAGNOSIS — N18.4 TYPE 2 DIABETES MELLITUS WITH STAGE 4 CHRONIC KIDNEY DISEASE, WITH LONG-TERM CURRENT USE OF INSULIN: ICD-10-CM

## 2025-06-04 DIAGNOSIS — Z00.00 ENCOUNTER FOR MEDICARE ANNUAL WELLNESS EXAM: Primary | ICD-10-CM

## 2025-06-04 DIAGNOSIS — N18.4 STAGE 4 CHRONIC KIDNEY DISEASE: ICD-10-CM

## 2025-06-04 DIAGNOSIS — Z99.89 DEPENDENCE ON OTHER ENABLING MACHINES AND DEVICES: ICD-10-CM

## 2025-06-04 DIAGNOSIS — M47.816 LUMBAR FACET ARTHROPATHY: ICD-10-CM

## 2025-06-04 DIAGNOSIS — I12.9 TYPE 2 DIABETES MELLITUS WITH STAGE 3B CHRONIC KIDNEY DISEASE AND HYPERTENSION: ICD-10-CM

## 2025-06-04 DIAGNOSIS — M48.061 SPINAL STENOSIS OF LUMBAR REGION, UNSPECIFIED WHETHER NEUROGENIC CLAUDICATION PRESENT: ICD-10-CM

## 2025-06-04 DIAGNOSIS — D63.1 ANEMIA ASSOCIATED WITH CHRONIC RENAL FAILURE: ICD-10-CM

## 2025-06-04 DIAGNOSIS — G47.33 OSA (OBSTRUCTIVE SLEEP APNEA): ICD-10-CM

## 2025-06-04 DIAGNOSIS — Z86.73 HISTORY OF STROKE: ICD-10-CM

## 2025-06-04 DIAGNOSIS — Z79.4 TYPE 2 DIABETES MELLITUS WITH STAGE 4 CHRONIC KIDNEY DISEASE, WITH LONG-TERM CURRENT USE OF INSULIN: ICD-10-CM

## 2025-06-04 DIAGNOSIS — D50.9 IRON DEFICIENCY ANEMIA, UNSPECIFIED IRON DEFICIENCY ANEMIA TYPE: ICD-10-CM

## 2025-06-04 DIAGNOSIS — Z79.4 TYPE 2 DIABETES MELLITUS WITH DIABETIC POLYNEUROPATHY, WITH LONG-TERM CURRENT USE OF INSULIN: ICD-10-CM

## 2025-06-04 DIAGNOSIS — I10 ESSENTIAL HYPERTENSION: ICD-10-CM

## 2025-06-04 DIAGNOSIS — E78.5 DYSLIPIDEMIA ASSOCIATED WITH TYPE 2 DIABETES MELLITUS: ICD-10-CM

## 2025-06-04 DIAGNOSIS — M51.369 DEGENERATION OF INTERVERTEBRAL DISC OF LUMBAR REGION, UNSPECIFIED WHETHER PAIN PRESENT: ICD-10-CM

## 2025-06-04 DIAGNOSIS — E11.3513 TYPE 2 DIABETES MELLITUS WITH BOTH EYES AFFECTED BY PROLIFERATIVE RETINOPATHY AND MACULAR EDEMA, WITH LONG-TERM CURRENT USE OF INSULIN: ICD-10-CM

## 2025-06-04 DIAGNOSIS — Z79.4 TYPE 2 DIABETES MELLITUS WITH BOTH EYES AFFECTED BY PROLIFERATIVE RETINOPATHY AND MACULAR EDEMA, WITH LONG-TERM CURRENT USE OF INSULIN: ICD-10-CM

## 2025-06-04 DIAGNOSIS — E11.22 TYPE 2 DIABETES MELLITUS WITH STAGE 4 CHRONIC KIDNEY DISEASE, WITH LONG-TERM CURRENT USE OF INSULIN: ICD-10-CM

## 2025-06-04 DIAGNOSIS — R26.9 ABNORMALITY OF GAIT AND MOBILITY: ICD-10-CM

## 2025-06-04 DIAGNOSIS — M1A.00X0 IDIOPATHIC CHRONIC GOUT WITHOUT TOPHUS, UNSPECIFIED SITE: ICD-10-CM

## 2025-06-04 DIAGNOSIS — E11.22 TYPE 2 DIABETES MELLITUS WITH STAGE 3B CHRONIC KIDNEY DISEASE AND HYPERTENSION: ICD-10-CM

## 2025-06-04 DIAGNOSIS — I13.0 HYPERTENSIVE HEART AND CHRONIC KIDNEY DISEASE WITH HEART FAILURE AND STAGE 1 THROUGH STAGE 4 CHRONIC KIDNEY DISEASE, OR UNSPECIFIED CHRONIC KIDNEY DISEASE: ICD-10-CM

## 2025-06-04 DIAGNOSIS — E11.42 TYPE 2 DIABETES MELLITUS WITH DIABETIC POLYNEUROPATHY, WITH LONG-TERM CURRENT USE OF INSULIN: ICD-10-CM

## 2025-06-04 DIAGNOSIS — Z74.09 OTHER REDUCED MOBILITY: ICD-10-CM

## 2025-06-04 DIAGNOSIS — E11.69 DYSLIPIDEMIA ASSOCIATED WITH TYPE 2 DIABETES MELLITUS: ICD-10-CM

## 2025-06-04 DIAGNOSIS — I51.89 DIASTOLIC DYSFUNCTION: Chronic | ICD-10-CM

## 2025-06-04 DIAGNOSIS — N18.9 ANEMIA ASSOCIATED WITH CHRONIC RENAL FAILURE: ICD-10-CM

## 2025-06-04 DIAGNOSIS — M17.11 PRIMARY OSTEOARTHRITIS OF RIGHT KNEE: ICD-10-CM

## 2025-06-04 DIAGNOSIS — I77.819 ECTATIC AORTA: ICD-10-CM

## 2025-06-04 DIAGNOSIS — J45.20 MILD INTERMITTENT ASTHMA WITHOUT COMPLICATION: ICD-10-CM

## 2025-06-04 DIAGNOSIS — I69.351 HEMIPLEGIA AND HEMIPARESIS FOLLOWING CEREBRAL INFARCTION AFFECTING RIGHT DOMINANT SIDE: ICD-10-CM

## 2025-06-04 DIAGNOSIS — N18.32 TYPE 2 DIABETES MELLITUS WITH STAGE 3B CHRONIC KIDNEY DISEASE AND HYPERTENSION: ICD-10-CM

## 2025-06-04 PROCEDURE — 99999 PR PBB SHADOW E&M-EST. PATIENT-LVL V: CPT | Mod: PBBFAC,,, | Performed by: NURSE PRACTITIONER

## 2025-06-04 PROCEDURE — 1100F PTFALLS ASSESS-DOCD GE2>/YR: CPT | Mod: CPTII,S$GLB,, | Performed by: NURSE PRACTITIONER

## 2025-06-04 PROCEDURE — 3075F SYST BP GE 130 - 139MM HG: CPT | Mod: CPTII,S$GLB,, | Performed by: NURSE PRACTITIONER

## 2025-06-04 PROCEDURE — 3078F DIAST BP <80 MM HG: CPT | Mod: CPTII,S$GLB,, | Performed by: NURSE PRACTITIONER

## 2025-06-04 PROCEDURE — G0439 PPPS, SUBSEQ VISIT: HCPCS | Mod: S$GLB,,, | Performed by: NURSE PRACTITIONER

## 2025-06-04 PROCEDURE — 3288F FALL RISK ASSESSMENT DOCD: CPT | Mod: CPTII,S$GLB,, | Performed by: NURSE PRACTITIONER

## 2025-06-04 PROCEDURE — 1158F ADVNC CARE PLAN TLK DOCD: CPT | Mod: CPTII,S$GLB,, | Performed by: NURSE PRACTITIONER

## 2025-06-04 PROCEDURE — 3044F HG A1C LEVEL LT 7.0%: CPT | Mod: CPTII,S$GLB,, | Performed by: NURSE PRACTITIONER

## 2025-06-04 PROCEDURE — 1125F AMNT PAIN NOTED PAIN PRSNT: CPT | Mod: CPTII,S$GLB,, | Performed by: NURSE PRACTITIONER

## 2025-06-04 NOTE — PATIENT INSTRUCTIONS
Counseling and Referral of Other Preventative  (Italic type indicates deductible and co-insurance are waived)    Patient Name: Annika Mac  Today's Date: 6/4/2025    Health Maintenance       Date Due Completion Date    RSV Vaccine (Age 60+ and Pregnant patients) (1 - 1-dose 75+ series) Never done ---    Diabetic Eye Exam 03/05/2025 3/5/2024    Foot Exam 10/30/2025 10/30/2024    Override on 4/10/2022: Done    Override on 9/10/2021: Done (Seeing Dr. Pacheco regularly)    Override on 7/28/2020: Done    Override on 6/13/2019: Done ([facility])    Override on 5/18/2018: Done    Override on 7/11/2017: Done    Lipid Panel 11/04/2025 11/4/2024    Hemoglobin A1c 12/02/2025 6/2/2025    Mammogram 12/16/2025 12/16/2024    Colorectal Cancer Screening 04/26/2026 4/26/2023    Diabetes Urine Screening 06/02/2026 6/2/2025    High Dose Statin 06/04/2026 6/4/2025    TETANUS VACCINE 08/24/2028 8/24/2018    Override on 9/30/2005: Done        No orders of the defined types were placed in this encounter.    The following information is provided to all patients.  This information is to help you find resources for any of the problems found today that may be affecting your health:                  Living healthy guide: www.[facility].[place].gov      Understanding Diabetes: www.diabetes.org      Eating healthy: www.cdc.gov/healthyweight      CDC home safety checklist: www.cdc.gov/steadi/patient.html      Agency on Aging: www.goea.[place].gov      Alcoholics anonymous (AA): www.aa.org      Physical Activity: www.eric.nih.gov/qk0lvks      Tobacco use: www.quitwithusla.org         Counseling and Referral of Other Preventative  (Italic type indicates deductible and co-insurance are waived)    Patient Name: Annika Mac  Today's Date: 6/4/2025    Health Maintenance       Date Due Completion Date    RSV Vaccine (Age 60+ and Pregnant patients) (1 - 1-dose 75+ series) Never done ---    Diabetic Eye Exam 03/05/2025 3/5/2024    Foot Exam 10/30/2025  10/30/2024    Override on 4/10/2022: Done    Override on 9/10/2021: Done (Seeing Dr. Pacheco regularly)    Override on 7/28/2020: Done    Override on 6/13/2019: Done ([facility])    Override on 5/18/2018: Done    Override on 7/11/2017: Done    Lipid Panel 11/04/2025 11/4/2024    Hemoglobin A1c 12/02/2025 6/2/2025    Mammogram 12/16/2025 12/16/2024    Colorectal Cancer Screening 04/26/2026 4/26/2023    Diabetes Urine Screening 06/02/2026 6/2/2025    High Dose Statin 06/04/2026 6/4/2025    TETANUS VACCINE 08/24/2028 8/24/2018    Override on 9/30/2005: Done        No orders of the defined types were placed in this encounter.    The following information is provided to all patients.  This information is to help you find resources for any of the problems found today that may be affecting your health:                  Living healthy guide: www.[facility].[place].[facility]      Understanding Diabetes: www.diabetes.org      Eating healthy: www.cdc.gov/healthyweight      CDC home safety checklist: www.cdc.gov/steadi/patient.html      Agency on Aging: www.goea.[place].gov      Alcoholics anonymous (AA): www.aa.org      Physical Activity: www.eric.nih.gov/fe8ajok      Tobacco use: www.quitwithusla.org         Counseling and Referral of Other Preventative  (Italic type indicates deductible and co-insurance are waived)    Patient Name: Annika Mac  Today's Date: 6/4/2025    Health Maintenance       Date Due Completion Date    RSV Vaccine (Age 60+ and Pregnant patients) (1 - 1-dose 75+ series) Never done ---    Diabetic Eye Exam 03/05/2025 3/5/2024    Foot Exam 10/30/2025 10/30/2024    Override on 4/10/2022: Done    Override on 9/10/2021: Done (Seeing Dr. Pacheco regularly)    Override on 7/28/2020: Done    Override on 6/13/2019: Done ([facility])    Override on 5/18/2018: Done    Override on 7/11/2017: Done    Lipid Panel 11/04/2025 11/4/2024    Hemoglobin A1c 12/02/2025 6/2/2025    Mammogram 12/16/2025 12/16/2024    Colorectal Cancer  Screening 04/26/2026 4/26/2023    Diabetes Urine Screening 06/02/2026 6/2/2025    High Dose Statin 06/04/2026 6/4/2025    TETANUS VACCINE 08/24/2028 8/24/2018    Override on 9/30/2005: Done        No orders of the defined types were placed in this encounter.    The following information is provided to all patients.  This information is to help you find resources for any of the problems found today that may be affecting your health:                  Living healthy guide: www.[facility].[place].[facility]      Understanding Diabetes: www.diabetes.org      Eating healthy: www.cdc.gov/healthyweight      CDC home safety checklist: www.cdc.gov/steadi/patient.html      Agency on Aging: www.goea.[place].[facility]      Alcoholics anonymous (AA): www.aa.org      Physical Activity: www.eric.nih.gov/uw0szei      Tobacco use: www.quitwithusla.org

## 2025-06-04 NOTE — PROGRESS NOTES
Dania     Ms. Mac's case has been assigned to Jose Chucky @[phone].       What happens next? Assigned advocate will review your patients chart and research available options.  Patient may be asked to provide specific documentation to help determine eligibility. Failure to provide the requested documentation will delay assistance.    Please note all requests are subject to program availability and patient eligibility verification.   Please note each program has it's own unique eligibility criteria (e.g., income limits, insurance status medical condition, residency).Therefore eligibility is determined by the specific program being applied to not by the Pharmacy Patient Assistance Team.  Please note epic chart must reflect a current order for the requested medication written by an [facility] provider to begin PAP process.   Provider may review progress notes by typing pharmacy patient assistance in Epic search box.       Thank you,   [facility]  [address] [facility] [address]  [place] [address]  Fax: [phone]  Email: [email or web address]

## 2025-06-04 NOTE — PROGRESS NOTES
"  Annika Mac presented for a follow-up Medicare AWV today. The following components were reviewed and updated:    Medical history  Family History  Social history  Allergies and Current Medications  Health Risk Assessment  Health Maintenance  Care Team    **See Completed Assessments for Annual Wellness visit with in the encounter summary    The following assessments were completed:  Depression Screening  Cognitive function Screening      Timed Get Up Test  Whisper Test      Opioid documentation:      Patient does not have a current opioid prescription.          Vitals:    06/04/25 0816   BP: 130/66   BP Location: Left arm   Patient Position: Sitting   Pulse: 80   SpO2: 95%   Weight: 130.4 kg (287 lb 7.7 oz)   Height: 5' 5" (1.651 m)     Body mass index is 47.84 kg/m².       Physical Exam  Vitals reviewed.   Constitutional:       Appearance: She is well-developed. She is obese.   HENT:      Head: Normocephalic.      Right Ear: External ear normal.      Left Ear: External ear normal.      Nose: Nose normal.      Mouth/Throat:      Pharynx: No oropharyngeal exudate.   Eyes:      Pupils: Pupils are equal, round, and reactive to light.   Neck:      Thyroid: No thyromegaly.      Vascular: No JVD.      Trachea: No tracheal deviation.   Cardiovascular:      Rate and Rhythm: Normal rate and regular rhythm.      Heart sounds: Normal heart sounds. No murmur heard.     No friction rub. No gallop.   Pulmonary:      Effort: Pulmonary effort is normal. No respiratory distress.      Breath sounds: Normal breath sounds. No wheezing or rales.   Abdominal:      General: Bowel sounds are normal. There is no distension.      Palpations: Abdomen is soft.      Tenderness: There is no abdominal tenderness.   Musculoskeletal:      Cervical back: Neck supple.      Right knee: Bony tenderness and crepitus present. Decreased range of motion. Tenderness present.        Legs:    Lymphadenopathy:      Cervical: No cervical adenopathy.   Skin:     " General: Skin is warm and dry.      Findings: No rash.   Neurological:      Mental Status: She is alert and oriented to person, place, and time.   Psychiatric:         Behavior: Behavior normal.          Diagnoses and health risks identified today and associated recommendations/orders:  1. Encounter for Medicare annual wellness exam  All age and gender related screenings discussed   - Referral to Enhanced Annual Wellness Visit (eAWV) W+1    2. Hemiplegia and hemiparesis following cerebral infarction affecting right dominant side  Stable. Will cont asa and Lipitor and f/u with neurology      3. History of stroke  Stable. Will cont asa and Lipitor and f/u with neurology      4. Degeneration of intervertebral disc of lumbar region, unspecified whether pain present  Stable. Will cont cymbalta and lyrica and f/u with pain mgmt     5. Lumbar facet arthropathy  Stable. Will cont cymbalta and lyrica and f/u with pain mgmt     6. Spinal stenosis of lumbar region, unspecified whether neurogenic claudication present  Stable. Will cont cymbalta and lyrica and f/u with pain mgmt     7. Mild intermittent asthma without complication  Stable. Will cont albuterol     8. Diastolic dysfunction  Stable. Will cont jardiance, labetalol and chlorthalidone. Low and iet and monitor weight     9. Dyslipidemia associated with type 2 diabetes mellitus  Stable. Will cont asa and lipitor     10. Ectatic aorta  Stable. Will cont asa and lipitor     11. Essential hypertension  Stable. Will cont labetalol and amlodipine     12. Hypertensive heart and chronic kidney disease with heart failure and stage 1 through stage 4 chronic kidney disease, or unspecified chronic kidney disease  Stable. Will cont to monitor labs and cont BP and CBG control     13. Stage 4 chronic kidney disease  Stable. Will cont BP and CBG control Will cont to monitor labs     14. Iron deficiency anemia, unspecified iron deficiency anemia type  Stable. Will cont to monitor iron  and CBC     15. Anemia associated with chronic renal failure  Stable. Will cont to monitor CBC     16. Type 2 diabetes mellitus with both eyes affected by proliferative retinopathy and macular edema, with long-term current use of insulin  Stable. Will cont jardiance and tresiba and ozempic   - Ambulatory referral/consult to Pharmacy Assistance; Future    17. Type 2 diabetes mellitus with stage 3b chronic kidney disease and hypertension  Stable. Will cont jardiance and tresiba and ozempic   - Ambulatory referral/consult to Pharmacy Assistance; Future    18. Type 2 diabetes mellitus with diabetic polyneuropathy, with long-term current use of insulin  Stable. Will cont jardiance and tresiba and ozempic   - Ambulatory referral/consult to Pharmacy Assistance; Future    19. RUFINA (obstructive sleep apnea)  Stable. Will cont CPAP use     20. Peripheral autonomic neuropathy due to DM  Stble. Will cont cymbalta     21. Idiopathic chronic gout without tophus, unspecified site  Stable. will cont allopurinol and colchicine     22. Primary osteoarthritis of right knee  Stable. Will cont f/u with ortho     23. Dependence on other enabling machines and devices  Stable. Will cont use of assistive devices     24. Abnormality of gait and mobility  Stable. Will cont use of assistive devices     25. Other reduced mobility  Stable. Will cont use of assistive devices     26. Type 2 diabetes mellitus with stage 4 chronic kidney disease, with long-term current use of insulin  Stable. Will cont jardiance and tresiba and ozempic       Provided Annika with a 5-10 year written screening schedule and personal prevention plan. Recommendations were developed using the USPSTF age appropriate recommendations. Education, counseling, and referrals were provided as needed.  After Visit Summary printed and given to patient which includes a list of additional screenings\tests needed.    Post Discharge Follow-up Today:   Future Appointments:  Future  Appointments   Date Time Provider Department Center   6/10/2025  9:00 AM Saskia Beal FNP-C [facility] ENDODIA [facility]   6/17/2025 10:40 AM SUSSY Maldonado MD [facility] OPHTHAL [facility]   8/14/2025  9:30 AM Julia Torres NP [facility] NEPHRO [facility]   8/14/2025 10:30 AM Enid James, DPGABRIELA [facility] POD [facility] Ort   12/2/2025 11:00 AM Mamie Cotton MD [facility] IM [facility] PCW   12/18/2025  3:40 PM Trent Leon MD [facility]           I offered to discuss advanced care planning, including how to pick a person who would make decisions for you if you were unable to make them for yourself, called a health care power of , and what kind of decisions you might make such as use of life sustaining treatments such as ventilators and tube feeding when faced with a life limiting illness recorded on a living will that they will need to know. (How you want to be cared for as you near the end of your natural life)     X Patient is interested in learning more about how to make advanced directives.  I provided them paperwork and offered to discuss this with them.    Asmita PHILIP, RICARDA, FNP-c  Nurse Practitioner   Internal Medicine   [address] [facility] [address]  [phone]

## 2025-06-06 ENCOUNTER — PATIENT MESSAGE (OUTPATIENT)
Dept: DIABETES | Facility: CLINIC | Age: 75
End: 2025-06-06
Payer: MEDICARE

## 2025-06-09 ENCOUNTER — TELEPHONE (OUTPATIENT)
Dept: PHARMACY | Facility: CLINIC | Age: 75
End: 2025-06-09
Payer: MEDICARE

## 2025-06-09 ENCOUNTER — RESULTS FOLLOW-UP (OUTPATIENT)
Dept: PHARMACY | Facility: CLINIC | Age: 75
End: 2025-06-09

## 2025-06-09 NOTE — LETTER
June 9, 2025    Annika CURRAN [address]  [place] [address]           Dear Ms. Mac,    My name is Jose Locke , and I am reaching out on behalf of [facility]s Pharmacy Patient Assistance Team regarding your request for medication assistance. Our goal is to help qualified [facility] patients obtain financial assistance for prescribed medications.    Please note that enrollment into available support may require the following documents:    Completed Medication Access Center authorization forms, Copy of all insurance cards (front and back), and Proof of household income (such as social security award letter, pension statement or 3 consecutive pay stubs)    If you still need assistance with your medications, please reach out to the phone number listed below. If we do not hear back from you, a second contact attempt will be made via mail or your [facility] portal in 5 to 10 business days.    Thank you for giving us the opportunity to assist you with your healthcare needs. We look forward to working with you.      Sincerely  Jose Locke @[phone]  Pharmacy Patient Assistance Team  [address] [place] [address]  [id number] [place] [address]  Fax: [phone]  Email: [email or web address]

## 2025-06-09 NOTE — TELEPHONE ENCOUNTER
----- Message from Charo Steele sent at 6/4/2025 10:17 AM CDT -----  Regarding: Order for [facility]     Ms. Mac's case has been assigned to Jose Locke @[phone].       What happens next? Assigned advocate will review your patients chart and research available options.  Patient may be asked to provide specific documentation to help determine eligibility. Failure to provide the requested documentation will delay assistance.    Please note all requests are subject to program availability and patient eligibility verification.   Please note each program has it's own unique eligibility criteria (e.g., income limits, insurance status medical condition, residency).Therefore eligibility is determined by the specific program   being applied to not by the Pharmacy Patient Assistance Team.  Please note epic chart must reflect a current order for the requested medication written by an [facility] provider to begin PAP process.   Provider may review progress notes by typing pharmacy patient assistance in Epic search box.       Thank you,   [facility]  [address] [facility] [address]  [place] [address]  Fax: [phone]  Email: [email or web address]      ----- Message -----  From: Asmita Gallardo NP  Sent: 6/4/2025   8:52 AM CDT  To: Pharmacy Patient Assistance Team  Subject: Order for [facility]

## 2025-06-09 NOTE — TELEPHONE ENCOUNTER
First contact attempt has been made via letter and portal message  . Welcome letter and MAC Enrollment Packet has been sent via letter and portal message . Follow-up will be made in approximately 5 to 10 business days.      Jose Douglas   Pharmacy Patient Assistance Team

## 2025-06-10 ENCOUNTER — OFFICE VISIT (OUTPATIENT)
Dept: ENDOCRINOLOGY | Facility: CLINIC | Age: 75
End: 2025-06-10
Payer: MEDICARE

## 2025-06-10 VITALS
SYSTOLIC BLOOD PRESSURE: 139 MMHG | HEART RATE: 73 BPM | WEIGHT: 285.25 LBS | DIASTOLIC BLOOD PRESSURE: 61 MMHG | BODY MASS INDEX: 47.47 KG/M2

## 2025-06-10 DIAGNOSIS — E11.42 TYPE 2 DIABETES MELLITUS WITH DIABETIC POLYNEUROPATHY, UNSPECIFIED WHETHER LONG TERM INSULIN USE: Primary | ICD-10-CM

## 2025-06-10 DIAGNOSIS — E11.3513 TYPE 2 DIABETES MELLITUS WITH BOTH EYES AFFECTED BY PROLIFERATIVE RETINOPATHY AND MACULAR EDEMA, WITH LONG-TERM CURRENT USE OF INSULIN: ICD-10-CM

## 2025-06-10 DIAGNOSIS — N18.30 ANEMIA IN STAGE 3 CHRONIC KIDNEY DISEASE, UNSPECIFIED WHETHER STAGE 3A OR 3B CKD: ICD-10-CM

## 2025-06-10 DIAGNOSIS — E11.649 HYPOGLYCEMIA UNAWARENESS ASSOCIATED WITH TYPE 2 DIABETES MELLITUS: Chronic | ICD-10-CM

## 2025-06-10 DIAGNOSIS — E11.42 TYPE 2 DIABETES MELLITUS WITH DIABETIC POLYNEUROPATHY, WITH LONG-TERM CURRENT USE OF INSULIN: ICD-10-CM

## 2025-06-10 DIAGNOSIS — E78.5 DYSLIPIDEMIA ASSOCIATED WITH TYPE 2 DIABETES MELLITUS: ICD-10-CM

## 2025-06-10 DIAGNOSIS — N25.81 SECONDARY HYPERPARATHYROIDISM: ICD-10-CM

## 2025-06-10 DIAGNOSIS — D63.1 ANEMIA IN STAGE 3 CHRONIC KIDNEY DISEASE, UNSPECIFIED WHETHER STAGE 3A OR 3B CKD: ICD-10-CM

## 2025-06-10 DIAGNOSIS — I12.9 TYPE 2 DIABETES MELLITUS WITH STAGE 3B CHRONIC KIDNEY DISEASE AND HYPERTENSION: ICD-10-CM

## 2025-06-10 DIAGNOSIS — Z79.4 TYPE 2 DIABETES MELLITUS WITH DIABETIC POLYNEUROPATHY, WITH LONG-TERM CURRENT USE OF INSULIN: ICD-10-CM

## 2025-06-10 DIAGNOSIS — E11.69 DYSLIPIDEMIA ASSOCIATED WITH TYPE 2 DIABETES MELLITUS: ICD-10-CM

## 2025-06-10 DIAGNOSIS — N18.32 TYPE 2 DIABETES MELLITUS WITH STAGE 3B CHRONIC KIDNEY DISEASE AND HYPERTENSION: ICD-10-CM

## 2025-06-10 DIAGNOSIS — E11.22 TYPE 2 DIABETES MELLITUS WITH STAGE 3B CHRONIC KIDNEY DISEASE AND HYPERTENSION: ICD-10-CM

## 2025-06-10 DIAGNOSIS — Z79.4 TYPE 2 DIABETES MELLITUS WITH BOTH EYES AFFECTED BY PROLIFERATIVE RETINOPATHY AND MACULAR EDEMA, WITH LONG-TERM CURRENT USE OF INSULIN: ICD-10-CM

## 2025-06-10 PROCEDURE — 3288F FALL RISK ASSESSMENT DOCD: CPT | Mod: CPTII,S$GLB,,

## 2025-06-10 PROCEDURE — 3044F HG A1C LEVEL LT 7.0%: CPT | Mod: CPTII,S$GLB,,

## 2025-06-10 PROCEDURE — 3078F DIAST BP <80 MM HG: CPT | Mod: CPTII,S$GLB,,

## 2025-06-10 PROCEDURE — 1159F MED LIST DOCD IN RCRD: CPT | Mod: CPTII,S$GLB,,

## 2025-06-10 PROCEDURE — 99214 OFFICE O/P EST MOD 30 MIN: CPT | Mod: S$GLB,,,

## 2025-06-10 PROCEDURE — 1126F AMNT PAIN NOTED NONE PRSNT: CPT | Mod: CPTII,S$GLB,,

## 2025-06-10 PROCEDURE — 3075F SYST BP GE 130 - 139MM HG: CPT | Mod: CPTII,S$GLB,,

## 2025-06-10 PROCEDURE — 1100F PTFALLS ASSESS-DOCD GE2>/YR: CPT | Mod: CPTII,S$GLB,,

## 2025-06-10 PROCEDURE — 99999 PR PBB SHADOW E&M-EST. PATIENT-LVL V: CPT | Mod: PBBFAC,,,

## 2025-06-10 PROCEDURE — 95251 CONT GLUC MNTR ANALYSIS I&R: CPT | Mod: S$GLB,,,

## 2025-06-10 PROCEDURE — 1160F RVW MEDS BY RX/DR IN RCRD: CPT | Mod: CPTII,S$GLB,,

## 2025-06-10 RX ORDER — INSULIN LISPRO 100 [IU]/ML
INJECTION, SOLUTION INTRAVENOUS; SUBCUTANEOUS
Qty: 60 ML | Refills: 3 | Status: SHIPPED | OUTPATIENT
Start: 2025-06-10

## 2025-06-10 RX ORDER — SEMAGLUTIDE 2.68 MG/ML
2 INJECTION, SOLUTION SUBCUTANEOUS
Qty: 3 ML | Refills: 11 | Status: SHIPPED | OUTPATIENT
Start: 2025-06-10

## 2025-06-10 NOTE — PATIENT INSTRUCTIONS
With regards to diabetes;  Noted elevated blood sugars at mealtimes followed by low blood sugars caused by late administration of mealtime insulin (Humalog)    Take mealtime insulin (Humalog) with each meal  Take mealtime insulin (Humalog) 10-15 minutes prior to eating to avoid both high and low blood sugars  Diabetes Education Referral placed  Return to clinic in 3 months    Transition from Trulicity to Ozempic once you have Ozempic from Pharmacy Assistance.    Medication Regimen  Tresiba 45 units daily  Humalog 13 units with breakfast and lunch + moderate SSI  Stop Trulicity 4.5 mg weekly  Start Ozempic 1 mg weekly (36 clicks)  Jardiance 10 mg daily    Ozempic   First 2 clicks get you from 0 to the start of the line  36 clicks to 1 mg  54 clicks to 1.5 mg   72 clicks to 2.0 mg      Maria G Lopez:  Diabetes Educator  Please call me directly at [phone]

## 2025-06-10 NOTE — ASSESSMENT & PLAN NOTE
LDL at goal  Continue on statin per ADA guidelines.   Occupational Therapy   Treatment    Name: Ernie Phan  MRN: [id number]  Admitting Diagnosis:  Rectal bleeding  2 Days Post-Op    Recommendations:     Discharge Recommendations: home with home health  Discharge Equipment Recommendations:  none  Barriers to discharge:  None    Assessment:     Ernie Phan is a 87 y.o. male with a medical diagnosis of Rectal bleeding.  He presents supine in bed and willing to participate.  Pt with improved mobility for in room access but with continued close contact guard to min A for self care completion . Performance deficits affecting function are impaired endurance, impaired self care skills, impaired functional mobilty.     Rehab Prognosis:  Good; patient would benefit from acute skilled OT services to address these deficits and reach maximum level of function.       Plan:     Patient to be seen 3 x/week to address the above listed problems via self-care/home management, therapeutic activities, therapeutic exercises  · Plan of Care Expires: 04/20/19  · Plan of Care Reviewed with: patient, spouse    Subjective     Pain/Comfort:  ·      Objective:     Communicated with: RN prior to session.  Patient found supine with (lines intact) upon OT entry to room.    General Precautions: Standard, fall   Orthopedic Precautions:N/A   Braces:       Occupational Performance:     Bed Mobility:    · Patient completed Rolling/Turning to Right with stand by assistance  · Patient completed Scooting/Bridging with stand by assistance  · Patient completed Supine to Sit with contact guard assistance  · Patient completed Sit to Supine with contact guard assistance     Functional Mobility/Transfers:  · Patient completed Sit <> Stand Transfer with contact guard assistance  with  rolling walker   · Patient completed Bed <> Chair Transfer using Step Transfer technique with stand by assistance with rolling walker    Activities of Daily Living:  · Feeding:  independence    · Grooming: contact guard  assistance    · Upper Body Dressing: contact guard assistance        [facility] 6 Click ADL:      Treatment & Education:  Pt educated on safety, role of OT, importance of increased participation in self care for gains , expectations for participation, expectations for gains, POC, energy conservation, caregiver strain. White board updated.   - ADL training     Patient left up in chair with all lines intactEducation:      GOALS:   Multidisciplinary Problems     Occupational Therapy Goals        Problem: Occupational Therapy Goal    Goal Priority Disciplines Outcome Interventions   Occupational Therapy Goal     OT, PT/OT     Description:  Goals to be met by:3/31    Patient will increase functional independence with ADLs by performing:    LE Dressing with [place].  Grooming while standing with Contact Guard Assistance.  Toileting from toilet with Contact Guard Assistance for hygiene and clothing management.   Bathing from  tub bench with Minimal Assistance.                       Time Tracking:     OT Date of Treatment: 03/22/19  OT Start Time: 1145  OT Stop Time: 1200  OT Total Time (min): 15 min    Billable Minutes:Therapeutic Activity 15    Carly Kern, OT  3/22/2019

## 2025-06-10 NOTE — ASSESSMENT & PLAN NOTE
-- Reviewed goals of therapy are to get the best control we can without hypoglycemia  -- Labs prior to follow up.  -- A1c at goal <7.5%.  -- Reviewed logs/CGM:  Dexcom report reivewed with good TIR of 71%, <2% low, GMI 7.1%, and average .  Post prandial hyperglycemia followed by hypoglycemia suspected to be caused by late mealtime insulin administration.  Reviewed Propper mealtime insulin administration and referred to diabetes education.  Plan to transition from Trulicity to Ozempic for better weight loss.   -- Reviewed patient's current insulin regimen. Clarified proper insulin dose and timing in relation to meals, etc. Insulin injection sites and proper rotation instructed.    -- Advised frequent self blood glucose monitoring.  Patient encouraged to document glucose results and bring them to every clinic visit    -- Hypoglycemia precautions discussed.  -- Call for Bg repeatedly < 70 or > 200.   -- Close adherence to lifestyle changes recommended.   -- Periodic follow ups for eye evaluations, foot care and dental care suggested.  -- Encouraged exercise per ADA guidelines.    -- Encouraged dietary modifications including but not limited to DM diet, low carb snacks, marrying carbohydrates with proteins.  Medication Regimen:   Tresiba 45 units daily  Humalog 13 units with breakfast and lunch + moderate SSI  Start Ozempic 1 mg weekly (36 clicks)  Jardiance 10 mg daily

## 2025-06-12 ENCOUNTER — TELEPHONE (OUTPATIENT)
Dept: ENDOCRINOLOGY | Facility: CLINIC | Age: 75
End: 2025-06-12
Payer: MEDICARE

## 2025-06-13 NOTE — TELEPHONE ENCOUNTER
Called pt and pt stated that she needs 2 weeks hospital follow up. Can we put this pt 3. Pm on June 13th for 3.00 pm.     Thank you.    Post-Acute Authorization Submission      Post Acute Pre-Cert Documentation  Request Submitted by Facility - Type:: Post Acute  Post-Acute Authorization Type Submitted:: SNF  Date Post Acute Pre-Cert Inititated per Facility: 06/09/25  Date Post Acute Pre-Cert Completed: 06/13/25  Accepting Facility: [facility] Discharge Date Requested: 06/10/25  Response Received from Insurance?: Denial  Action Taken by Requesting Facility:: Facility Appeal  Response Communicated to::   Authorization Number:: EXPEDITED APPEAL REQUESTED/SNF DENIAL# [id number]  Post Acute Pre-Cert Initiated Comment: COMPLETED AOR FORM & CLINICALS SENT TO T FAST APPEALS FAX# [phone]. FOLLOW UP AT # [phone] OPT. 5 FOR APPEAL CASE#. AWAITING DETERMINATION.              Jimmy Mccullough, PCT

## 2025-06-17 ENCOUNTER — CLINICAL SUPPORT (OUTPATIENT)
Dept: DIABETES | Facility: CLINIC | Age: 75
End: 2025-06-17
Payer: MEDICARE

## 2025-06-17 ENCOUNTER — CLINICAL SUPPORT (OUTPATIENT)
Dept: OPHTHALMOLOGY | Facility: CLINIC | Age: 75
End: 2025-06-17
Payer: MEDICARE

## 2025-06-17 ENCOUNTER — OFFICE VISIT (OUTPATIENT)
Dept: OPHTHALMOLOGY | Facility: CLINIC | Age: 75
End: 2025-06-17
Payer: MEDICARE

## 2025-06-17 DIAGNOSIS — E11.22 TYPE 2 DIABETES MELLITUS WITH STAGE 3B CHRONIC KIDNEY DISEASE AND HYPERTENSION: Primary | ICD-10-CM

## 2025-06-17 DIAGNOSIS — E11.42 TYPE 2 DIABETES MELLITUS WITH DIABETIC POLYNEUROPATHY, UNSPECIFIED WHETHER LONG TERM INSULIN USE: ICD-10-CM

## 2025-06-17 DIAGNOSIS — E11.3513 TYPE 2 DIABETES MELLITUS WITH BOTH EYES AFFECTED BY PROLIFERATIVE RETINOPATHY AND MACULAR EDEMA, WITH LONG-TERM CURRENT USE OF INSULIN: Primary | ICD-10-CM

## 2025-06-17 DIAGNOSIS — N18.32 TYPE 2 DIABETES MELLITUS WITH STAGE 3B CHRONIC KIDNEY DISEASE AND HYPERTENSION: Primary | ICD-10-CM

## 2025-06-17 DIAGNOSIS — E11.42 TYPE 2 DIABETES MELLITUS WITH DIABETIC POLYNEUROPATHY, WITH LONG-TERM CURRENT USE OF INSULIN: ICD-10-CM

## 2025-06-17 DIAGNOSIS — I12.9 TYPE 2 DIABETES MELLITUS WITH STAGE 3B CHRONIC KIDNEY DISEASE AND HYPERTENSION: Primary | ICD-10-CM

## 2025-06-17 DIAGNOSIS — Z79.4 TYPE 2 DIABETES MELLITUS WITH DIABETIC POLYNEUROPATHY, WITH LONG-TERM CURRENT USE OF INSULIN: ICD-10-CM

## 2025-06-17 DIAGNOSIS — Z79.4 TYPE 2 DIABETES MELLITUS WITH BOTH EYES AFFECTED BY PROLIFERATIVE RETINOPATHY AND MACULAR EDEMA, WITH LONG-TERM CURRENT USE OF INSULIN: Primary | ICD-10-CM

## 2025-06-17 PROCEDURE — 92201 OPSCPY EXTND RTA DRAW UNI/BI: CPT | Mod: S$GLB,,, | Performed by: OPHTHALMOLOGY

## 2025-06-17 PROCEDURE — 3288F FALL RISK ASSESSMENT DOCD: CPT | Mod: CPTII,S$GLB,, | Performed by: OPHTHALMOLOGY

## 2025-06-17 PROCEDURE — 99999 PR PBB SHADOW E&M-EST. PATIENT-LVL I: CPT | Mod: PBBFAC,,,

## 2025-06-17 PROCEDURE — 92134 CPTRZ OPH DX IMG PST SGM RTA: CPT | Mod: S$GLB,,, | Performed by: OPHTHALMOLOGY

## 2025-06-17 PROCEDURE — 1160F RVW MEDS BY RX/DR IN RCRD: CPT | Mod: CPTII,S$GLB,, | Performed by: OPHTHALMOLOGY

## 2025-06-17 PROCEDURE — 99999 PR PBB SHADOW E&M-EST. PATIENT-LVL III: CPT | Mod: PBBFAC,,, | Performed by: OPHTHALMOLOGY

## 2025-06-17 PROCEDURE — 1101F PT FALLS ASSESS-DOCD LE1/YR: CPT | Mod: CPTII,S$GLB,, | Performed by: OPHTHALMOLOGY

## 2025-06-17 PROCEDURE — 3044F HG A1C LEVEL LT 7.0%: CPT | Mod: CPTII,S$GLB,, | Performed by: OPHTHALMOLOGY

## 2025-06-17 PROCEDURE — 1159F MED LIST DOCD IN RCRD: CPT | Mod: CPTII,S$GLB,, | Performed by: OPHTHALMOLOGY

## 2025-06-17 PROCEDURE — G0108 DIAB MANAGE TRN  PER INDIV: HCPCS | Mod: S$GLB,,, | Performed by: INTERNAL MEDICINE

## 2025-06-17 PROCEDURE — 92014 COMPRE OPH EXAM EST PT 1/>: CPT | Mod: S$GLB,,, | Performed by: OPHTHALMOLOGY

## 2025-06-17 NOTE — PROGRESS NOTES
HPI     dm     dfe    oct    erm  me         Additional comments: Dm   Last bs    130  Last A1c    unsure     Me   Erm     Blurry va     Dls  03/05/24          Last edited by Paty Jimenez on 6/17/2025 10:06 AM.            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 Glover       2-PDR OU:  T2 uncontrolled on insulin   -S/P PRP OU   -PRP OS most recently 11/25/13 with new subhyaloid heme then, got Avastin 11/13/13   -Last HbA1c was 7.4   -Emphasized the importance of tight glucose control    3-ERM OU:   -Good VA, monitor    4-PCIOL OU:   -2002 OS by Rosendo   -2012 OD by Dr. Glover        F/U 1 year  OCT

## 2025-06-25 NOTE — PROGRESS NOTES
Diabetes Care Specialist Progress Note  Author: Maria G Lopez RN, [facility]  Date: 6/17/2025    Intake    Program Intake  Reason for Diabetes Program Visit:: Initial Diabetes Assessment  Type of Intervention:: Individual  Education: Self-Management Skill Review  Current diabetes risk level:: moderate  In the last month, have you used the ER or been admitted to the hospital: No  Was the ER or hospital admission related to diabetes?: No  Permission to speak with others about care:: no    Current Diabetes Treatment: DM Injectables, Insulin, Oral Medications  Oral Medication Type/Dose: Jardiance 10 mg  DM Injectables Type/Dose: Ozempic 2 mg weekly  Method of insulin delivery?: Injections  Injection Type: Pens  Pen Type/Dose: Tresiba 45 units; Humalog `13 units with meals plus s/s    Continuous Glucose Monitoring  Patient has CGM: Yes  Personal CGM type:: Dexcom G7 with   GMI Date: 06/17/25  GMI Value: 7 %    Lab Results   Component Value Date    HGBA1C 6.9 (H) 06/02/2025               There is no height or weight on file to calculate BMI.    Lifestyle Coping Support & Clinical    Lifestyle/Coping/Support  Compared to other people your age, how would you rate your health?: Fair  How do you deal with stress/distress?: Support from family  Learning Barriers:: None  Culture or Christianity beliefs that may impact ability to access healthcare: No  Psychosocial/Coping Skills Assessment Completed: : Yes  Assessment indicates:: Adequate understanding    Problem Review  Active Comorbidities: Hypertension, Chronic Kidney Disease, Other (comment), Stroke, Cardiovascular Disease, Chronic Pain (Difficulty with ambulation)    Diabetes Self-Management Skills Assessment    Medication Skills Assessment  Patient is able to identify current diabetes medications, dosages, and appropriate timing of medications.: yes  Patient reports problems or concerns with current medication regimen.: no  Medication regimen problems/concerns:: concerned  about side effects  Patient is  aware that some diabetes medications can cause low blood sugar?: Yes  Medication Skills Assessment Completed:: Yes  Assessment indicates:: Adequate understanding  Area of need?: No    Diabetes Disease Process/Treatment Options  Diabetes Type?: Type II  If previous diabetes education, when/where:: 2023  What are your goals for this education session?: Review medication administration  Is patient aware of what causes diabetes?: Yes  Does patient understand the pathophysiology of diabetes?: Yes  Diabetes Disease Process/Treatment Options: Skills Assessment Completed: Yes  Assessment indicates:: Adequate understanding  Area of need?: No    Nutrition/Healthy Eating  Meal Plan 24 Hour Recall - Breakfast: yesterday; waffles, eggs, turkey phan  Meal Plan 24 Hour Recall - Lunch: cabbage and corn bread  Meal Plan 24 Hour Recall - Snack: juice, peanut butter crackers  Patient can identify foods that impact blood sugar.: yes  Challenges to healthy eating:: portion control  Assessment indicates:: Knowledge deficit, Instruction Needed  Area of need?: Yes    Physical Activity/Exercise  Patient's daily activity level:: sedentary  Patient formally exercises outside of work.: no  Reasons for not exercising:: physically unable to exercise currently  Patient can identify forms of physical activity.: no  Assessment indicates:: Knowledge deficit  Area of need?: Yes    Home Blood Glucose Monitoring  Patient states that blood sugar is checked at home daily.: yes  Monitoring Method:: home glucometer  Home glucometer meter type:: Insurance preferred meter  Personal CGM type:: Dexcom G7 with   Assessment indicates:: Adequate understanding  Area of need?: No    Acute Complications  Have you ever had hypoglycemia (low BG 70 or less)?: no  Do you know the symptoms of low blood sugar and how to treat?: yes  Acute Complications Skills Assessment Completed: : Yes  Assessment indicates:: Knowledge  deficit  Area of need?: Yes    Chronic Complications  Reviewed health maintenance: yes  Assessment indicates:: Knowledge deficit  Area of need?: Yes      Assessment Summary and Plan    Based on today's diabetes care assessment, the following areas of need were identified:      Identified Areas of Need      Medication/Current Diabetes Treatment: No, see care planning   Lifestyle Coping/Support: No   Diabetes Disease Process/Treatment Options: No   Nutrition/Healthy Eating: Yes, reviewed meal planning   Physical Activity/Exercise: Yes, Discussed benefits of exercise as it relates to insulin resistance and weight loss    Home Blood Glucose Monitoring: No    Acute Complications: Yes, Reviewed blood glucose goals, prevention, detection, signs and symptoms, and treatment of hypoglycemia following rule of 15 and hyperglycemia, and when to contact the clinic. Advised to carry a source of fast acting carbs.    Chronic Complications: Yes, Discussed long term complications of diabetes. Patient agrees to have the following diabetes sidney maintenance screenings/appointments yearly eye exams, foot exams       Today's interventions were provided through individual discussion, instruction, and written materials were provided.      Patient verbalized understanding of instruction and written materials.  Pt was able to return back demonstration of instructions today. Patient understood key points, needs reinforcement and further instruction.     Diabetes Self-Management Care Plan:    Today's Diabetes Self-Management Care Plan was developed with Annika's input. Annika has agreed to work toward the following goal(s) to improve his/her overall diabetes control.      Care Plan: Diabetes Management   Updates made since 6/25/2024 12:00 AM        Problem: Medications         Goal: Patient Agrees to take Diabetes Medication(s) as prescribed.    Start Date: 5/4/2021   Expected End Date: 9/11/2025   This Visit's Progress: On track   Recent  Progress: On track   Priority: High   Barriers: No Barriers Identified   Note:    Dexcom download was evaluated.  She is having lows at various times of the days. Most lows are occurring overnight and in the afternoon which corresponds to her reported low she experinced a few weeks back where intervention was required. Denies any missed doses and is eating 3 meals per day.  Per HCP her insulin will be reduced to the following: Novolin 70/30 80 units in am and 40 units in pm.  She will stop giving the lunch dose.     She records her carb amounts in Dexcom, however she has been entering them incorrectly and reflects a larger amount of carbs than what she is consuming.   She was instructed on how to enter the correct amt into her Dexcom.     Update to care planning 2/17/2022:  Currently taking Tresiba 30 units in am, Humalog 10 units ac meals and Ozempic.  Blood glucose logs reviewed.  Patient doing well with her numbers see attached logs.  Not using the Dexcom.  Current A1c down to 6.6%    Update to care planning 4/18/2022:  current A1c is up at 8.1%.  this is an increase from January A1c.  Dr. Love has increased her Tresiba to 35 and Humalog 10/11/11 plus s/s.  She also has been in pain with her back which is causing her stress and discomfort.  This may also be a contributing factor to her elevated numbers. Continues with ozempic.     Update to care planning 5/9/2022: Patient today for DM ed.  BG logs reviewed by MD. Dr. Love increased her Tresiba from 35 to 40 units daily for elevations especially in am.  Ms. Mac was not using the sliding scale as ordered.  Reviewed how to use it and instructed to use 150 +1 s/s with Humalog only.  Currently takes 10/11/11 with meals.  Understanding verbalized.     Update to care planning 12/1/2022: Ms. Roblero was seen for review of her bg logs. Mostly elevated in 152 to 198 range in morning; lunch and dinner same.  Her A1c has improved since April.  Reviewed logs with   Ivan and will make the following changes: Increase Tresiba to 45 units in am and Humalog 12 units ac meals plus s/s 150 +2.  Understanding verbalized.     Update to care planning 2/22/23:  Ms. Roblero was seen today in clinic for bg log review.  She is planning on restarting her Dexcom and is awaiting delivery from distributor. BG in  acceptable ranges with no low glucose noted.  Highest seen per logs 255. Taking insulin as instruction without issues. Denies missed doses.  Advised to contact me when she receives her Dexcom for training.     Update to care planning 6/21/2023:  On track, no issues reported. Denies missed doses.     Update to care planning 06/17/2025: Patient seen for review of insulin administration.  Reviewed her doses and reviewed how to use the sliding scal.          Follow Up Plan     F/u on 9/11/2025    Today's care plan and follow up schedule was discussed with patient.  Annika verbalized understanding of the care plan, goals, and agrees to follow up plan.        The patient was encouraged to communicate with his/her health care provider/physician and care team regarding his/her condition(s) and treatment.  I provided the patient with my contact information today and encouraged to contact me via phone or [facility] Portal as needed.     Length of Visit   Total Time: 60 Minutes

## 2025-07-21 ENCOUNTER — TELEPHONE (OUTPATIENT)
Dept: INTERNAL MEDICINE | Facility: CLINIC | Age: 75
End: 2025-07-21
Payer: MEDICARE

## 2025-07-21 NOTE — TELEPHONE ENCOUNTER
Spoke with pt regarding RX. Pt needed advice for her med refill was not at [facility] pharmacy [facility] RX was sent to [facility] pharmacy. Pt states she will check pharmacy and will follow up.

## 2025-07-21 NOTE — TELEPHONE ENCOUNTER
Copied from CRM #[id number]. Topic: Medications - Medication Status Check   >> Jul 21, 2025  4:00 PM Radha wrote:  Type:  Needs Medical Advice    Who Called: Patient/Annika   Symptoms (please be specific):  medication status check   How long has patient had these symptoms:  NA  Pharmacy name and phone #:  [facility] - [place] [id number] [facility]  [address]  [facility] [address]  Phone: [phone] Fax: [phone]    Would the patient rather a call back or a response via [facility]?  Call back   Best Call Back Number: [phone]  Additional Information: pt said that she is calling in regards to needing to check the the status of the doctor send a rx into the pharmacy for Jardiance. Please advise

## 2025-07-21 NOTE — TELEPHONE ENCOUNTER
Copied from CRM #[id number]. Topic: General Inquiry - Return Call  >> Jul 21, 2025  3:52 PM Jenna wrote:  Type:  Patient Returning Call    Who Called:Patient   Who Left Message for Patient:Maday Cisneros MA  Does the patient know what this is regarding?:  Would the patient rather a call back or a response via [facility]? Call back   Best Call Back Number:[phone]  Additional Information:

## 2025-07-21 NOTE — TELEPHONE ENCOUNTER
Copied from CRM #[id number]. Topic: General Inquiry - Patient Advice  >> Jul 21, 2025 12:47 PM Clif wrote:  .1MEDICALADVICE     Patient is calling for Medical Advice regarding:pt is calling in regards to a medication and is having some concerns about a medication called empagliflozin (JARDIANCE) 10 mg tablet and is requesting a call as soon as possible      Patient wants a call back or thru [facility], provide patient's call back phone number:Annika Mac [phone]      Comments:    Please advise patient replies from provider may take up to 48 hours.

## 2025-08-05 DIAGNOSIS — E11.42 TYPE 2 DIABETES MELLITUS WITH DIABETIC POLYNEUROPATHY, UNSPECIFIED WHETHER LONG TERM INSULIN USE: ICD-10-CM

## 2025-08-05 RX ORDER — BLOOD-GLUCOSE SENSOR
EACH MISCELLANEOUS
Qty: 9 EACH | Refills: 3 | Status: SHIPPED | OUTPATIENT
Start: 2025-08-05

## 2025-08-05 RX ORDER — COLCHICINE 0.6 MG/1
TABLET ORAL
Qty: 30 TABLET | Refills: 2 | Status: CANCELLED | OUTPATIENT
Start: 2025-08-05

## 2025-08-05 RX ORDER — BLOOD-GLUCOSE SENSOR
EACH MISCELLANEOUS
Qty: 9 EACH | Refills: 3 | OUTPATIENT
Start: 2025-08-05

## 2025-08-05 RX ORDER — BLOOD-GLUCOSE SENSOR
EACH MISCELLANEOUS
OUTPATIENT
Start: 2025-08-05

## 2025-08-05 RX ORDER — BLOOD-GLUCOSE,RECEIVER,CONT
EACH MISCELLANEOUS
Qty: 1 EACH | Refills: 0 | Status: SHIPPED | OUTPATIENT
Start: 2025-08-05

## 2025-08-05 RX ORDER — COLCHICINE 0.6 MG/1
TABLET ORAL
Qty: 90 TABLET | Refills: 1 | Status: SHIPPED | OUTPATIENT
Start: 2025-08-05

## 2025-08-05 NOTE — TELEPHONE ENCOUNTER
Care Due:                  Date            Visit Type   Department     Provider  --------------------------------------------------------------------------------                                EP -                              [facility] INTERNAL  Mamie Mora  Last Visit: 06-      CARE ([facility])   MEDICINE       Yury                              [facility] INTERNAL  Mamie Mora  Next Visit: 12-      CARE ([facility])   MEDICINE       Yury                                                            Last  Test          Frequency    Reason                     Performed    Due Date  --------------------------------------------------------------------------------    CMP.........  6 months...  atorvastatin,              11-   10-                             hydrocortisone...........    Lipid Panel.  12 months..  atorvastatin.............  11-   10-    [facility] Embedded Care Due Messages. Reference number: [id number].   8/05/2025 10:17:44 AM CDT

## 2025-08-05 NOTE — TELEPHONE ENCOUNTER
Copied from CRM #[id number]. Topic: Medications - Medication Refill  >> Aug 5, 2025 10:03 AM Milton wrote:  Type:  RX Refill Request    Who Called: [employer] / Patient  Refill or New Rx:Refill  RX Name and Strength: Dexcom G7 Device & Sensors  How is the patient currently taking it? (ex. 1XDay):  Is this a 30 day or 90 day RX:  Preferred Pharmacy with phone number:   [place] [id number] - [place], [place] - [address] [address]  [address] [address]  [facility] [address]  Phone: [phone] Fax: [phone]    [facility] - [place], [place] - [address]  [address] [address]  Phone: [phone] Fax: [phone]  -------------------------------------------------------------------------------------  Type:  RX Refill Request    Who Called: [employer] / Patient  Refill or New Rx:Refill  RX Name and Strength: colchicine (COLCRYS) 0.6 mg tablet  How is the patient currently taking it? (ex. 1XDay):  Is this a 30 day or 90 day RX:  Preferred Pharmacy with phone number:   [facility] ([facility]) - [place], [place] - [address]  [address]  [address]  [facility] [address]  Phone: [phone] Fax: [phone]

## 2025-08-12 DIAGNOSIS — N18.4 STAGE 4 CHRONIC KIDNEY DISEASE: Primary | ICD-10-CM

## 2025-08-13 ENCOUNTER — PATIENT MESSAGE (OUTPATIENT)
Dept: NEPHROLOGY | Facility: CLINIC | Age: 75
End: 2025-08-13
Payer: MEDICARE

## 2025-08-14 ENCOUNTER — LAB VISIT (OUTPATIENT)
Dept: LAB | Facility: HOSPITAL | Age: 75
End: 2025-08-14
Attending: NURSE PRACTITIONER
Payer: MEDICARE

## 2025-08-14 ENCOUNTER — OFFICE VISIT (OUTPATIENT)
Dept: NEPHROLOGY | Facility: CLINIC | Age: 75
End: 2025-08-14
Payer: MEDICARE

## 2025-08-14 ENCOUNTER — OFFICE VISIT (OUTPATIENT)
Dept: PODIATRY | Facility: CLINIC | Age: 75
End: 2025-08-14
Payer: MEDICARE

## 2025-08-14 VITALS
SYSTOLIC BLOOD PRESSURE: 144 MMHG | OXYGEN SATURATION: 98 % | WEIGHT: 284.38 LBS | BODY MASS INDEX: 47.33 KG/M2 | DIASTOLIC BLOOD PRESSURE: 71 MMHG | HEART RATE: 67 BPM

## 2025-08-14 VITALS
BODY MASS INDEX: 47.38 KG/M2 | SYSTOLIC BLOOD PRESSURE: 145 MMHG | HEIGHT: 65 IN | RESPIRATION RATE: 18 BRPM | DIASTOLIC BLOOD PRESSURE: 69 MMHG | WEIGHT: 284.38 LBS | HEART RATE: 69 BPM

## 2025-08-14 DIAGNOSIS — N25.81 SECONDARY HYPERPARATHYROIDISM: ICD-10-CM

## 2025-08-14 DIAGNOSIS — N28.1 BILATERAL RENAL CYSTS: ICD-10-CM

## 2025-08-14 DIAGNOSIS — M25.561 ACUTE PAIN OF RIGHT KNEE: ICD-10-CM

## 2025-08-14 DIAGNOSIS — D63.1 ANEMIA IN STAGE 3B CHRONIC KIDNEY DISEASE: ICD-10-CM

## 2025-08-14 DIAGNOSIS — I51.89 DIASTOLIC DYSFUNCTION: ICD-10-CM

## 2025-08-14 DIAGNOSIS — I10 ESSENTIAL HYPERTENSION: ICD-10-CM

## 2025-08-14 DIAGNOSIS — M25.571 CHRONIC PAIN OF RIGHT ANKLE: ICD-10-CM

## 2025-08-14 DIAGNOSIS — E11.42 DIABETIC POLYNEUROPATHY ASSOCIATED WITH TYPE 2 DIABETES MELLITUS: Primary | ICD-10-CM

## 2025-08-14 DIAGNOSIS — G89.29 CHRONIC PAIN OF RIGHT ANKLE: ICD-10-CM

## 2025-08-14 DIAGNOSIS — N18.32 CKD STAGE 3B, GFR 30-44 ML/MIN: ICD-10-CM

## 2025-08-14 DIAGNOSIS — N18.32 ANEMIA IN STAGE 3B CHRONIC KIDNEY DISEASE: ICD-10-CM

## 2025-08-14 DIAGNOSIS — N18.32 CKD STAGE 3B, GFR 30-44 ML/MIN: Primary | ICD-10-CM

## 2025-08-14 DIAGNOSIS — E66.01 MORBID OBESITY: ICD-10-CM

## 2025-08-14 DIAGNOSIS — B35.1 ONYCHOMYCOSIS DUE TO DERMATOPHYTE: ICD-10-CM

## 2025-08-14 LAB
25(OH)D3+25(OH)D2 SERPL-MCNC: 78 NG/ML (ref 30–96)
PTH-INTACT SERPL-MCNC: 160 PG/ML (ref 9–77)

## 2025-08-14 PROCEDURE — 11057 PARNG/CUTG B9 HYPRKR LES >4: CPT | Mod: Q9,S$GLB,, | Performed by: PODIATRIST

## 2025-08-14 PROCEDURE — 1125F AMNT PAIN NOTED PAIN PRSNT: CPT | Mod: CPTII,S$GLB,, | Performed by: NURSE PRACTITIONER

## 2025-08-14 PROCEDURE — 82306 VITAMIN D 25 HYDROXY: CPT

## 2025-08-14 PROCEDURE — 3077F SYST BP >= 140 MM HG: CPT | Mod: CPTII,S$GLB,, | Performed by: NURSE PRACTITIONER

## 2025-08-14 PROCEDURE — 36415 COLL VENOUS BLD VENIPUNCTURE: CPT

## 2025-08-14 PROCEDURE — 83970 ASSAY OF PARATHORMONE: CPT

## 2025-08-14 PROCEDURE — 3078F DIAST BP <80 MM HG: CPT | Mod: CPTII,S$GLB,, | Performed by: NURSE PRACTITIONER

## 2025-08-14 PROCEDURE — 99999 PR PBB SHADOW E&M-EST. PATIENT-LVL V: CPT | Mod: PBBFAC,,, | Performed by: NURSE PRACTITIONER

## 2025-08-14 PROCEDURE — 99499 UNLISTED E&M SERVICE: CPT | Mod: S$GLB,,, | Performed by: PODIATRIST

## 2025-08-14 PROCEDURE — 3044F HG A1C LEVEL LT 7.0%: CPT | Mod: CPTII,S$GLB,, | Performed by: NURSE PRACTITIONER

## 2025-08-14 PROCEDURE — G2211 COMPLEX E/M VISIT ADD ON: HCPCS | Mod: S$GLB,,, | Performed by: NURSE PRACTITIONER

## 2025-08-14 PROCEDURE — 11721 DEBRIDE NAIL 6 OR MORE: CPT | Mod: Q9,XS,S$GLB, | Performed by: PODIATRIST

## 2025-08-14 PROCEDURE — 99214 OFFICE O/P EST MOD 30 MIN: CPT | Mod: S$GLB,,, | Performed by: NURSE PRACTITIONER

## 2025-08-14 PROCEDURE — 99999 PR PBB SHADOW E&M-EST. PATIENT-LVL IV: CPT | Mod: PBBFAC,,, | Performed by: PODIATRIST

## 2025-08-19 DIAGNOSIS — E11.42 TYPE 2 DIABETES MELLITUS WITH DIABETIC POLYNEUROPATHY, WITH LONG-TERM CURRENT USE OF INSULIN: ICD-10-CM

## 2025-08-19 DIAGNOSIS — Z79.4 TYPE 2 DIABETES MELLITUS WITH DIABETIC POLYNEUROPATHY, WITH LONG-TERM CURRENT USE OF INSULIN: ICD-10-CM

## 2025-08-19 RX ORDER — INSULIN DEGLUDEC 100 U/ML
45 INJECTION, SOLUTION SUBCUTANEOUS NIGHTLY
Qty: 45 ML | Refills: 3 | Status: SHIPPED | OUTPATIENT
Start: 2025-08-19

## 2025-08-26 ENCOUNTER — OFFICE VISIT (OUTPATIENT)
Dept: ORTHOPEDICS | Facility: CLINIC | Age: 75
End: 2025-08-26
Payer: MEDICARE

## 2025-08-26 VITALS — WEIGHT: 286.63 LBS | HEIGHT: 65 IN | BODY MASS INDEX: 47.75 KG/M2

## 2025-08-26 DIAGNOSIS — E11.42 TYPE 2 DIABETES MELLITUS WITH DIABETIC POLYNEUROPATHY, WITH LONG-TERM CURRENT USE OF INSULIN: ICD-10-CM

## 2025-08-26 DIAGNOSIS — Z79.4 TYPE 2 DIABETES MELLITUS WITH DIABETIC POLYNEUROPATHY, WITH LONG-TERM CURRENT USE OF INSULIN: ICD-10-CM

## 2025-08-26 DIAGNOSIS — R26.89 BALANCE PROBLEM: ICD-10-CM

## 2025-08-26 DIAGNOSIS — I69.351 HEMIPLEGIA AND HEMIPARESIS FOLLOWING CEREBRAL INFARCTION AFFECTING RIGHT DOMINANT SIDE: ICD-10-CM

## 2025-08-26 DIAGNOSIS — Z74.09 DECREASED FUNCTIONAL MOBILITY AND ENDURANCE: ICD-10-CM

## 2025-08-26 DIAGNOSIS — E66.01 MORBID OBESITY: ICD-10-CM

## 2025-08-26 DIAGNOSIS — N18.4 STAGE 4 CHRONIC KIDNEY DISEASE: ICD-10-CM

## 2025-08-26 DIAGNOSIS — M17.11 PRIMARY OSTEOARTHRITIS OF RIGHT KNEE: Primary | ICD-10-CM

## 2025-08-26 PROCEDURE — 99999 PR PBB SHADOW E&M-EST. PATIENT-LVL V: CPT | Mod: PBBFAC,,, | Performed by: PHYSICIAN ASSISTANT

## 2025-08-26 RX ORDER — METHYLPREDNISOLONE ACETATE 80 MG/ML
80 INJECTION, SUSPENSION INTRA-ARTICULAR; INTRALESIONAL; INTRAMUSCULAR; SOFT TISSUE
Status: COMPLETED | OUTPATIENT
Start: 2025-08-26 | End: 2025-08-26

## 2025-08-26 RX ADMIN — METHYLPREDNISOLONE ACETATE 80 MG: 80 INJECTION, SUSPENSION INTRA-ARTICULAR; INTRALESIONAL; INTRAMUSCULAR; SOFT TISSUE at 10:08

## (undated) DEVICE — SUT VICRYL 4-0 ANTIBACT

## (undated) DEVICE — SYR 10CC LUER LOCK

## (undated) DEVICE — DRESSING XEROFORM FOIL PK 1X8

## (undated) DEVICE — DRESSING LEUKOPLAST FLEX 1X3IN

## (undated) DEVICE — TRAPEZIECTOMY TOOL

## (undated) DEVICE — PAD CAST 2 IN X 4YDS STERILE

## (undated) DEVICE — BANDAGE CONFORM 3IN STRL

## (undated) DEVICE — RETRIEVER SUTURE HEWSON DISP

## (undated) DEVICE — STAPLER SKIN REGULAR

## (undated) DEVICE — SUT ETHIBOND 3-0 RB1 30IN

## (undated) DEVICE — BLADE SAGITTAL FINE 5.5 X 18.5

## (undated) DEVICE — GLOVE BIOGEL SKINSENSE PI 7.0

## (undated) DEVICE — SUT 4/0 18IN ETHILON BL P3

## (undated) DEVICE — BANDAGE GAUZE 6PLY FLUFF 2X3

## (undated) DEVICE — Device

## (undated) DEVICE — SUT FIBERWIRE 4-0 18 IN TAP

## (undated) DEVICE — PACK CYSTO

## (undated) DEVICE — SEE MEDLINE ITEM 146308

## (undated) DEVICE — TOURNIQUET SB QC DP 18X4IN

## (undated) DEVICE — SLING ARM LARGE FOAM STRAP

## (undated) DEVICE — CATH POLLACK OPEN-END FLEXI-TI

## (undated) DEVICE — GAUZE SPONGE 4X4 12PLY

## (undated) DEVICE — SUT ETHILON 4-0 BLK MONO

## (undated) DEVICE — SUT X411H ETHIBOND 2-0

## (undated) DEVICE — SUT VICRYL 3-0 27 RB-1

## (undated) DEVICE — GOWN SMARTGOWN LVL4 X-LONG XL

## (undated) DEVICE — SUT CTD VICRYL 4-0 P-3 18IN

## (undated) DEVICE — PAD CAST SPECIALIST STRL 4

## (undated) DEVICE — DRAPE C-ARM ELAS CLIP 42X120IN

## (undated) DEVICE — BANDAGE ELASTIC 2X5 VELCRO ST

## (undated) DEVICE — SOL IRR WATER STRL 3000 ML

## (undated) DEVICE — SUT MONOCRYL 4-0 UD P-3 18

## (undated) DEVICE — SEE MEDLINE ITEM 157131

## (undated) DEVICE — APPLICATOR CHLORAPREP ORN 26ML

## (undated) DEVICE — TRAY CYSTO BASIN

## (undated) DEVICE — SPONGE LAP 18X18 PREWASHED

## (undated) DEVICE — GLOVE BIOGEL PI MICRO INDIC 7